# Patient Record
Sex: FEMALE | NOT HISPANIC OR LATINO | URBAN - METROPOLITAN AREA
[De-identification: names, ages, dates, MRNs, and addresses within clinical notes are randomized per-mention and may not be internally consistent; named-entity substitution may affect disease eponyms.]

---

## 2018-05-03 ENCOUNTER — INPATIENT (INPATIENT)
Facility: HOSPITAL | Age: 68
LOS: 10 days | Discharge: HOME | DRG: 673 | End: 2018-05-14
Attending: FAMILY MEDICINE | Admitting: INTERNAL MEDICINE
Payer: COMMERCIAL

## 2018-05-03 ENCOUNTER — EMERGENCY (EMERGENCY)
Facility: HOSPITAL | Age: 68
LOS: 1 days | Discharge: ANOTHER HOSPITAL | End: 2018-05-03
Attending: EMERGENCY MEDICINE | Admitting: EMERGENCY MEDICINE
Payer: COMMERCIAL

## 2018-05-03 VITALS
DIASTOLIC BLOOD PRESSURE: 79 MMHG | OXYGEN SATURATION: 99 % | SYSTOLIC BLOOD PRESSURE: 188 MMHG | RESPIRATION RATE: 20 BRPM | HEART RATE: 85 BPM

## 2018-05-03 VITALS
HEIGHT: 65 IN | HEART RATE: 62 BPM | RESPIRATION RATE: 16 BRPM | SYSTOLIC BLOOD PRESSURE: 175 MMHG | OXYGEN SATURATION: 100 % | TEMPERATURE: 98 F | DIASTOLIC BLOOD PRESSURE: 80 MMHG | WEIGHT: 210.1 LBS

## 2018-05-03 VITALS
TEMPERATURE: 99 F | HEART RATE: 74 BPM | WEIGHT: 188.05 LBS | RESPIRATION RATE: 20 BRPM | HEIGHT: 65 IN | SYSTOLIC BLOOD PRESSURE: 174 MMHG | DIASTOLIC BLOOD PRESSURE: 84 MMHG | OXYGEN SATURATION: 99 %

## 2018-05-03 DIAGNOSIS — N19 UNSPECIFIED KIDNEY FAILURE: ICD-10-CM

## 2018-05-03 DIAGNOSIS — D64.9 ANEMIA, UNSPECIFIED: ICD-10-CM

## 2018-05-03 DIAGNOSIS — E11.9 TYPE 2 DIABETES MELLITUS WITHOUT COMPLICATIONS: ICD-10-CM

## 2018-05-03 DIAGNOSIS — R07.9 CHEST PAIN, UNSPECIFIED: ICD-10-CM

## 2018-05-03 DIAGNOSIS — Z29.9 ENCOUNTER FOR PROPHYLACTIC MEASURES, UNSPECIFIED: ICD-10-CM

## 2018-05-03 DIAGNOSIS — I25.10 ATHEROSCLEROTIC HEART DISEASE OF NATIVE CORONARY ARTERY WITHOUT ANGINA PECTORIS: ICD-10-CM

## 2018-05-03 DIAGNOSIS — R06.02 SHORTNESS OF BREATH: ICD-10-CM

## 2018-05-03 DIAGNOSIS — Z90.710 ACQUIRED ABSENCE OF BOTH CERVIX AND UTERUS: Chronic | ICD-10-CM

## 2018-05-03 DIAGNOSIS — I10 ESSENTIAL (PRIMARY) HYPERTENSION: ICD-10-CM

## 2018-05-03 LAB
ALBUMIN SERPL ELPH-MCNC: 2.9 G/DL — LOW (ref 3.3–5)
ALP SERPL-CCNC: 72 U/L — SIGNIFICANT CHANGE UP (ref 30–120)
ALT FLD-CCNC: 16 U/L DA — SIGNIFICANT CHANGE UP (ref 10–60)
ANION GAP SERPL CALC-SCNC: 10 MMOL/L — SIGNIFICANT CHANGE UP (ref 5–17)
APPEARANCE UR: CLEAR — SIGNIFICANT CHANGE UP
APTT BLD: 27.4 SEC — LOW (ref 27.5–37.4)
AST SERPL-CCNC: 14 U/L — SIGNIFICANT CHANGE UP (ref 10–40)
BACTERIA # UR AUTO: ABNORMAL
BASOPHILS # BLD AUTO: 0 K/UL — SIGNIFICANT CHANGE UP (ref 0–0.2)
BASOPHILS NFR BLD AUTO: 0.2 % — SIGNIFICANT CHANGE UP (ref 0–2)
BILIRUB SERPL-MCNC: 0.2 MG/DL — SIGNIFICANT CHANGE UP (ref 0.2–1.2)
BILIRUB UR-MCNC: NEGATIVE — SIGNIFICANT CHANGE UP
BUN SERPL-MCNC: 67 MG/DL — HIGH (ref 7–23)
CALCIUM SERPL-MCNC: 9.1 MG/DL — SIGNIFICANT CHANGE UP (ref 8.4–10.5)
CHLORIDE SERPL-SCNC: 106 MMOL/L — SIGNIFICANT CHANGE UP (ref 96–108)
CK MB CFR SERPL CALC: 1 NG/ML — SIGNIFICANT CHANGE UP (ref 0–3.6)
CO2 SERPL-SCNC: 24 MMOL/L — SIGNIFICANT CHANGE UP (ref 22–31)
COLOR SPEC: YELLOW — SIGNIFICANT CHANGE UP
CREAT SERPL-MCNC: 4.88 MG/DL — HIGH (ref 0.5–1.3)
DIFF PNL FLD: ABNORMAL
EOSINOPHIL # BLD AUTO: 0.2 K/UL — SIGNIFICANT CHANGE UP (ref 0–0.5)
EOSINOPHIL NFR BLD AUTO: 2.2 % — SIGNIFICANT CHANGE UP (ref 0–6)
EPI CELLS # UR: SIGNIFICANT CHANGE UP
GLUCOSE SERPL-MCNC: 158 MG/DL — HIGH (ref 70–99)
GLUCOSE UR QL: 100 MG/DL
HCT VFR BLD CALC: 24.4 % — LOW (ref 34.5–45)
HGB BLD-MCNC: 7.5 G/DL — LOW (ref 11.5–15.5)
INR BLD: 1.22 RATIO — HIGH (ref 0.88–1.16)
KETONES UR-MCNC: NEGATIVE — SIGNIFICANT CHANGE UP
LACTATE SERPL-SCNC: 0.5 MMOL/L — LOW (ref 0.7–2)
LEUKOCYTE ESTERASE UR-ACNC: NEGATIVE — SIGNIFICANT CHANGE UP
LYMPHOCYTES # BLD AUTO: 2.2 K/UL — SIGNIFICANT CHANGE UP (ref 1–3.3)
LYMPHOCYTES # BLD AUTO: 22 % — SIGNIFICANT CHANGE UP (ref 13–44)
MCHC RBC-ENTMCNC: 26.4 PG — LOW (ref 27–34)
MCHC RBC-ENTMCNC: 30.8 GM/DL — LOW (ref 32–36)
MCV RBC AUTO: 85.6 FL — SIGNIFICANT CHANGE UP (ref 80–100)
MONOCYTES # BLD AUTO: 0.5 K/UL — SIGNIFICANT CHANGE UP (ref 0–0.9)
MONOCYTES NFR BLD AUTO: 5.1 % — SIGNIFICANT CHANGE UP (ref 2–14)
NEUTROPHILS # BLD AUTO: 7 K/UL — SIGNIFICANT CHANGE UP (ref 1.8–7.4)
NEUTROPHILS NFR BLD AUTO: 70.5 % — SIGNIFICANT CHANGE UP (ref 43–77)
NITRITE UR-MCNC: NEGATIVE — SIGNIFICANT CHANGE UP
NT-PROBNP SERPL-SCNC: 7456 PG/ML — HIGH (ref 0–125)
PH UR: 6 — SIGNIFICANT CHANGE UP (ref 5–8)
PLATELET # BLD AUTO: 229 K/UL — SIGNIFICANT CHANGE UP (ref 150–400)
POTASSIUM SERPL-MCNC: 5.8 MMOL/L — HIGH (ref 3.5–5.3)
POTASSIUM SERPL-SCNC: 5.8 MMOL/L — HIGH (ref 3.5–5.3)
PROT SERPL-MCNC: 7.8 G/DL — SIGNIFICANT CHANGE UP (ref 6–8.3)
PROT UR-MCNC: 100 MG/DL
PROTHROM AB SERPL-ACNC: 13.3 SEC — HIGH (ref 9.8–12.7)
RAPID RVP RESULT: SIGNIFICANT CHANGE UP
RBC # BLD: 2.85 M/UL — LOW (ref 3.8–5.2)
RBC # FLD: 14.7 % — HIGH (ref 10.3–14.5)
RBC CASTS # UR COMP ASSIST: ABNORMAL /HPF (ref 0–4)
SODIUM SERPL-SCNC: 140 MMOL/L — SIGNIFICANT CHANGE UP (ref 135–145)
SP GR SPEC: 1.01 — SIGNIFICANT CHANGE UP (ref 1.01–1.02)
TROPONIN I SERPL-MCNC: 0.02 NG/ML — SIGNIFICANT CHANGE UP (ref 0.02–0.06)
UROBILINOGEN FLD QL: NEGATIVE MG/DL — SIGNIFICANT CHANGE UP
WBC # BLD: 10 K/UL — SIGNIFICANT CHANGE UP (ref 3.8–10.5)
WBC # FLD AUTO: 10 K/UL — SIGNIFICANT CHANGE UP (ref 3.8–10.5)
WBC UR QL: SIGNIFICANT CHANGE UP

## 2018-05-03 PROCEDURE — 85730 THROMBOPLASTIN TIME PARTIAL: CPT

## 2018-05-03 PROCEDURE — 87086 URINE CULTURE/COLONY COUNT: CPT

## 2018-05-03 PROCEDURE — 99285 EMERGENCY DEPT VISIT HI MDM: CPT | Mod: 25

## 2018-05-03 PROCEDURE — 99223 1ST HOSP IP/OBS HIGH 75: CPT | Mod: AI,GC

## 2018-05-03 PROCEDURE — 83605 ASSAY OF LACTIC ACID: CPT

## 2018-05-03 PROCEDURE — 82553 CREATINE MB FRACTION: CPT

## 2018-05-03 PROCEDURE — 96375 TX/PRO/DX INJ NEW DRUG ADDON: CPT

## 2018-05-03 PROCEDURE — 87633 RESP VIRUS 12-25 TARGETS: CPT

## 2018-05-03 PROCEDURE — 87040 BLOOD CULTURE FOR BACTERIA: CPT

## 2018-05-03 PROCEDURE — 99285 EMERGENCY DEPT VISIT HI MDM: CPT

## 2018-05-03 PROCEDURE — 93010 ELECTROCARDIOGRAM REPORT: CPT

## 2018-05-03 PROCEDURE — 93005 ELECTROCARDIOGRAM TRACING: CPT

## 2018-05-03 PROCEDURE — 85027 COMPLETE CBC AUTOMATED: CPT

## 2018-05-03 PROCEDURE — 87581 M.PNEUMON DNA AMP PROBE: CPT

## 2018-05-03 PROCEDURE — 80053 COMPREHEN METABOLIC PANEL: CPT

## 2018-05-03 PROCEDURE — 87486 CHLMYD PNEUM DNA AMP PROBE: CPT

## 2018-05-03 PROCEDURE — 85610 PROTHROMBIN TIME: CPT

## 2018-05-03 PROCEDURE — 84484 ASSAY OF TROPONIN QUANT: CPT

## 2018-05-03 PROCEDURE — 87798 DETECT AGENT NOS DNA AMP: CPT

## 2018-05-03 PROCEDURE — 81001 URINALYSIS AUTO W/SCOPE: CPT

## 2018-05-03 PROCEDURE — 83880 ASSAY OF NATRIURETIC PEPTIDE: CPT

## 2018-05-03 PROCEDURE — 96374 THER/PROPH/DIAG INJ IV PUSH: CPT

## 2018-05-03 PROCEDURE — 71045 X-RAY EXAM CHEST 1 VIEW: CPT

## 2018-05-03 PROCEDURE — 71045 X-RAY EXAM CHEST 1 VIEW: CPT | Mod: 26

## 2018-05-03 RX ORDER — HEPARIN SODIUM 5000 [USP'U]/ML
5000 INJECTION INTRAVENOUS; SUBCUTANEOUS EVERY 12 HOURS
Qty: 0 | Refills: 0 | Status: DISCONTINUED | OUTPATIENT
Start: 2018-05-03 | End: 2018-05-05

## 2018-05-03 RX ORDER — GLUCAGON INJECTION, SOLUTION 0.5 MG/.1ML
1 INJECTION, SOLUTION SUBCUTANEOUS ONCE
Qty: 0 | Refills: 0 | Status: DISCONTINUED | OUTPATIENT
Start: 2018-05-03 | End: 2018-05-11

## 2018-05-03 RX ORDER — SODIUM CHLORIDE 9 MG/ML
1000 INJECTION, SOLUTION INTRAVENOUS
Qty: 0 | Refills: 0 | Status: DISCONTINUED | OUTPATIENT
Start: 2018-05-03 | End: 2018-05-11

## 2018-05-03 RX ORDER — INSULIN LISPRO 100/ML
VIAL (ML) SUBCUTANEOUS
Qty: 0 | Refills: 0 | Status: DISCONTINUED | OUTPATIENT
Start: 2018-05-03 | End: 2018-05-11

## 2018-05-03 RX ORDER — FUROSEMIDE 40 MG
40 TABLET ORAL ONCE
Qty: 0 | Refills: 0 | Status: COMPLETED | OUTPATIENT
Start: 2018-05-03 | End: 2018-05-03

## 2018-05-03 RX ORDER — DEXTROSE 50 % IN WATER 50 %
25 SYRINGE (ML) INTRAVENOUS ONCE
Qty: 0 | Refills: 0 | Status: DISCONTINUED | OUTPATIENT
Start: 2018-05-03 | End: 2018-05-11

## 2018-05-03 RX ORDER — DEXTROSE 50 % IN WATER 50 %
12.5 SYRINGE (ML) INTRAVENOUS ONCE
Qty: 0 | Refills: 0 | Status: DISCONTINUED | OUTPATIENT
Start: 2018-05-03 | End: 2018-05-11

## 2018-05-03 RX ORDER — HYDRALAZINE HCL 50 MG
10 TABLET ORAL ONCE
Qty: 0 | Refills: 0 | Status: COMPLETED | OUTPATIENT
Start: 2018-05-03 | End: 2018-05-03

## 2018-05-03 RX ORDER — DEXTROSE 50 % IN WATER 50 %
1 SYRINGE (ML) INTRAVENOUS ONCE
Qty: 0 | Refills: 0 | Status: DISCONTINUED | OUTPATIENT
Start: 2018-05-03 | End: 2018-05-11

## 2018-05-03 RX ORDER — NITROGLYCERIN 6.5 MG
1 CAPSULE, EXTENDED RELEASE ORAL ONCE
Qty: 0 | Refills: 0 | Status: COMPLETED | OUTPATIENT
Start: 2018-05-03 | End: 2018-05-03

## 2018-05-03 RX ORDER — ALBUTEROL 90 UG/1
2.5 AEROSOL, METERED ORAL ONCE
Qty: 0 | Refills: 0 | Status: COMPLETED | OUTPATIENT
Start: 2018-05-03 | End: 2018-05-03

## 2018-05-03 RX ORDER — ASPIRIN/CALCIUM CARB/MAGNESIUM 324 MG
325 TABLET ORAL ONCE
Qty: 0 | Refills: 0 | Status: COMPLETED | OUTPATIENT
Start: 2018-05-03 | End: 2018-05-03

## 2018-05-03 RX ORDER — SODIUM POLYSTYRENE SULFONATE 4.1 MEQ/G
30 POWDER, FOR SUSPENSION ORAL ONCE
Qty: 0 | Refills: 0 | Status: COMPLETED | OUTPATIENT
Start: 2018-05-03 | End: 2018-05-03

## 2018-05-03 RX ADMIN — Medication 40 MILLIGRAM(S): at 17:50

## 2018-05-03 RX ADMIN — Medication 40 MILLIGRAM(S): at 19:27

## 2018-05-03 RX ADMIN — Medication 1 INCH(S): at 17:50

## 2018-05-03 RX ADMIN — SODIUM POLYSTYRENE SULFONATE 30 GRAM(S): 4.1 POWDER, FOR SUSPENSION ORAL at 21:39

## 2018-05-03 RX ADMIN — Medication 325 MILLIGRAM(S): at 17:50

## 2018-05-03 RX ADMIN — ALBUTEROL 2.5 MILLIGRAM(S): 90 AEROSOL, METERED ORAL at 21:39

## 2018-05-03 NOTE — H&P ADULT - ATTENDING COMMENTS
Pt with acute renal failure, transferred from [place].  Will need HD.  Nephro already consulted, will see Pt in the Am and will llikely start HD.  Pt was in respiratory distress, Lasix helped relieve breathing issue.  Unclear anemia etiology, but possibly b/c of renal disease.    Cardio consulted 2/2 chest pain, initial workup negative.  Trend trops.

## 2018-05-03 NOTE — H&P ADULT - PROBLEM SELECTOR PLAN 1
CKD, previous refusal of dialysis. Now with hyperkalemia, s/p kayexalate and Albuterol  Dr. Albarran (Renal) consulted  in need of dialysis

## 2018-05-03 NOTE — H&P ADULT - PROBLEM SELECTOR PLAN 7
hx MI 2007 s/p stents   - No acute EKG changes, no elevation in trops   - Continue clopidogrel  - Cardio (Dr. Mosher) consulted.

## 2018-05-03 NOTE — H&P ADULT - PROBLEM SELECTOR PLAN 2
Unknown baseline, contact PMD in AM for baseline Hg  f/u iron studies  f/u FOBT Unknown baseline, contact PMD (Dr. Everardo Black, [facility]) in AM for baseline Hg; [place] anemia of chronic disease given CKD.   f/u iron studies  f/u FOBT Unknown baseline, contact PMD (Dr. Everardo Black, [facility]) in AM for baseline Hg; Likely anemia of chronic disease given CKD.   f/u iron studies  f/u FOBT

## 2018-05-03 NOTE — H&P ADULT - HISTORY OF PRESENT ILLNESS
68F PMHx CAD (3 stents 2007), DM type 2, HTN transferred from [place] ED for HD. Presented to [place] ED with SOB and CP x 2 days, mildly relieved by Nitroglycerin. Per patient...  Patient has previuosly been told by nephrologist she needs dialysis but has refused.     In the [place] ED Hg 7.5, K+ 5.8, Cr 4.88, lactate 0.5, proBNP 7456, trop negative x1.  UA grossly negative, RVP negative, CXR showing increased pulmonary vascular congestion. Dr. Mena (Cardio) consulted, CP likely MSK and MI to be ruled out with trops x2. Given , Lasix 40 x2, Nitro. Dr. Albarran (renal) paged.    Upon arrival to [place] /80, T 98.5, HR 62, RR 16, SpO2 100% on O2, given Albuterol and Kayexalate. 68F PMHx CAD (3 stents 2007), DM type 2 on insulin, Peripheral neuropathy, HTN transferred from [place] [facility] for HD. Presented to [facility] with progressive SOB and CP x 2 days, mildly relieved by Nitroglycerin. Patient's grandson provided translation. Per patient, she has had progressive SOB and mild cp over the past week. She has no prior history of a similar complaint. The SOB is made worse with activity and lying flat at night. She describes the chest pain and a dull pain without radiation. It is rated 3/10. She denies recent travel or sick contacts. Per grandson, patient has been told by nephrologist she needs dialysis but has refused; however consents to dialysis today. Patients admits to fatigue, lightheadedness, cp, palpitations, SOB, lower extremity edema. She denies fever, chills, N/V/C/D, abdominal pain, numbness/tingling in digits.     In the [place] [facility] Hg 7.5, K+ 5.8, Cr 4.88, lactate 0.5, proBNP 7456, trop negative x1.  UA grossly negative, RVP negative, CXR showing increased pulmonary vascular congestion. Dr. Mena (Cardio) consulted, CP likely MSK and MI to be ruled out with trops x2. Given , Lasix 40 x2, Nitro. Dr. Albarran (renal) paged.    Upon arrival to [place] /80, T 98.5, HR 62, RR 16, SpO2 100% on O2, given Albuterol and Kayexalate. 68F PMHx CAD (3 stents 2007), DM type 2 on insulin, Peripheral neuropathy, HTN, CKD now ESRD transferred from [place] ED for HD. Presented to [place] ED with progressive SOB and CP x 2 days, mildly relieved by Nitroglycerin. Patient's grandson provided translation. Per patient, she has had progressive SOB and mild cp over the past week. She has no prior history of a similar complaint. The SOB is made worse with activity and lying flat at night. She describes the chest pain and a dull pain without radiation. It is rated 3/10. She denies recent travel or sick contacts. Per grandson, patient has been told by nephrologist she needs dialysis but has refused; however consents to dialysis today. Patients admits to fatigue, lightheadedness, cp, palpitations, SOB, lower extremity edema. She denies fever, chills, N/V/C/D, abdominal pain, numbness/tingling in digits.     In the [place] ED Hg 7.5, K+ 5.8, Cr 4.88, lactate 0.5, proBNP 7456, trop negative x1.  UA grossly negative, RVP negative, CXR showing increased pulmonary vascular congestion. Dr. Mena (Cardio) consulted, CP likely MSK and MI to be ruled out with trops x2. Given , Lasix 40 x2, Nitro. Dr. Albarran (renal) paged.    Upon arrival to [place] /80, T 98.5, HR 62, RR 16, SpO2 100% on O2, given Albuterol and Kayexalate.

## 2018-05-03 NOTE — H&P ADULT - PROBLEM SELECTOR PLAN 3
with CXR showing pulmonary vascular congestion  fluid overload, s/p lasix 40x2, in need of HD  f/u Echo  proBNP elevated, likely component due to renal failure  Dr. Mena (Cardio) consulted with CXR showing pulmonary vascular congestion.   fluid overload, s/p lasix 40x2, in need of HD  f/u Echo  proBNP elevated, likely component due to renal failure  Dr. Mena (Cardio) consulted

## 2018-05-03 NOTE — H&P ADULT - PROBLEM SELECTOR PLAN 6
Chronic.   - low dose ISS with accuchecks, hypoglycemic protocol  - Diet: consistent carb Chronic.   - low dose ISS with accuchecks, hypoglycemic protocol  - Home lantus dose (30 AM, 10 PM) adjusted to (26 AM, 8 qhs)  - Diet: consistent carb

## 2018-05-03 NOTE — H&P ADULT - NSHPPHYSICALEXAM_GEN_ALL_CORE
Vital Signs  T(C): 36.6 (03 May 2018 23:17), Max: 36.9 (03 May 2018 20:37)  T(F): 97.8 (03 May 2018 23:17), Max: 98.5 (03 May 2018 20:37)  HR: 66 (03 May 2018 23:17) (62 - 66)  BP: 185/69 (03 May 2018 23:17) (175/80 - 185/69)  RR: 15 (03 May 2018 23:17) (15 - 16)  SpO2: 99% (03 May 2018 23:17) (99% - 100%) Vital Signs  T(C): 36.6 (03 May 2018 23:17), Max: 36.9 (03 May 2018 20:37)  T(F): 97.8 (03 May 2018 23:17), Max: 98.5 (03 May 2018 20:37)  HR: 66 (03 May 2018 23:17) (62 - 66)  BP: 185/69 (03 May 2018 23:17) (175/80 - 185/69)  RR: 15 (03 May 2018 23:17) (15 - 16)  SpO2: 99% (03 May 2018 23:17) (99% - 100%)    Physical Exam:  General: Elderly, East [place] woman in NAD.   HEENT: NCAT, PERRLA, EOMI bl, moist mucous membranes   Neck: Supple, nontender, no mass  Neurology: A&Ox3, nonfocal, CN II-XII grossly intact, sensation intact  Respiratory: decreased breath sound b/l; lung clear to auscultation b/l; no wheezing, rales, rhonci appreciated  CV: RRR, +S1/S2, no murmurs, rubs or gallops  Abdominal: Obese, Soft, NT, ND +BSx4  Extremities: 2+ pitting edema b/l, + peripheral pulses; dorsum left foot small (5 x3 cm) eccymosis  MSK: Normal ROM, no joint erythema or warmth, no joint swelling   Skin: warm, dry, normal color, no rash or abnormal lesions Vital Signs  T(C): 36.6 (03 May 2018 23:17), Max: 36.9 (03 May 2018 20:37)  T(F): 97.8 (03 May 2018 23:17), Max: 98.5 (03 May 2018 20:37)  HR: 66 (03 May 2018 23:17) (62 - 66)  BP: 185/69 (03 May 2018 23:17) (175/80 - 185/69)  RR: 15 (03 May 2018 23:17) (15 - 16)  SpO2: 99% (03 May 2018 23:17) (99% - 100%)    Physical Exam:  General: Elderly, [place] woman in NAD.   HEENT: NCAT, PERRLA, EOMI bl, moist mucous membranes   Neck: Supple, nontender, no mass  Neurology: A&Ox3, nonfocal, CN II-XII grossly intact, sensation intact  Respiratory: decreased breath sound b/l; lung clear to auscultation b/l; no wheezing, rales, rhonchi appreciated  CV: RRR, +S1/S2, no murmurs, rubs or gallops  Abdominal: Obese, Soft, NT, ND +BSx4  Extremities: 2+ pitting edema b/l, + peripheral pulses; dorsum left foot small (5 x3 cm) ecchymosis  MSK: Normal ROM, no joint erythema or warmth, no joint swelling   Skin: warm, dry, normal color, no rash or abnormal lesions

## 2018-05-03 NOTE — ED PROVIDER NOTE - MEDICAL DECISION MAKING DETAILS
69 yo F with CAD stents co chest pain and sob since last night. RO MI/CHF. Plan - EKG, CXR, labs, likely admit, cardio consult.

## 2018-05-03 NOTE — ED PROVIDER NOTE - CRITICAL CARE PROVIDED
additional history taking/interpretation of diagnostic studies/consult w/ pt's family directly relating to pts condition/direct patient care (not related to procedure)/documentation/consultation with other physicians

## 2018-05-03 NOTE — ED ADULT NURSE NOTE - OBJECTIVE STATEMENT
Presents to ED via amb from home. According to pt's son she developed SOB yesterday that is getting worse today. Today c/o mild chest discomfort. O&E alert & oriented. Color fair. Skin warm & dry to touch. Lungs- clear, diminished at bases. Abd soft nontender. 2+ bilat pedal edema. CM- RSR without ectopics noted

## 2018-05-03 NOTE — ED PROVIDER NOTE - PROGRESS NOTE DETAILS
Creat noted to be 4.8 with K of 5.8. Pt will need dialysis. Plan - Transfer to [place] [facility] for Renal eval and possible dialysis. Dr Hinson and Dr. Deion renteria.

## 2018-05-03 NOTE — ED PROVIDER NOTE - OBJECTIVE STATEMENT
69 y/o F pt with history of 3 coronary stents (2007) presents to the ED BIB ambulance c/o shortness of breath and chest pain beginning yesterday. Pt has not had any recent hospitalizations. Pt had a nitroglycerin pill in the ambulance, with mild relief. Pt takes Aspirin daily. Denies cough, nausea, abdominal pain, vomiting, fever. No further complaints at this time.   PMD- [facility]

## 2018-05-03 NOTE — H&P ADULT - NSHPREVIEWOFSYSTEMS_GEN_ALL_CORE
Constitutional: (+) admits fatigue, (-) denies fever, chills  HEENT: denies blurry vision, difficulty hearing  Respiratory: (-) admits SOB, HINSON, (-) cough, sputum production, wheezing, hemoptysis  Cardiovascular: (+) admits CP, palpitations, edema  Gastrointestinal: (-) denies nausea, vomiting, diarrhea, constipation, abdominal pain  Genitourinary: denies dysuria, frequency, urgency, hematuria   Skin/Breast: denies rash, itching  Musculoskeletal: denies myalgias, joint swelling, muscle weakness  Neurologic: (+) admits weakness, dizziness, numbness/tingling  Psychiatric: denies feeling anxious, depressed, suicidal, homicidal thoughts  Hematology/Oncology: denies bruising, tender or enlarged lymph nodes   ROS negative except as noted above

## 2018-05-03 NOTE — H&P ADULT - NSHPSOCIALHISTORY_GEN_ALL_CORE
Social History:    Marital Status:  (   )    (   ) Single    (   )    (  )   Occupation:   Lives with: (  ) alone  (  ) children   (  ) spouse   (  ) parents  (  ) other    Substance Use (street drugs): (  ) never used  (  ) other:  Tobacco Usage:  (   ) never smoked   (   ) former smoker   (   ) current smoker  (     ) pack year  (        ) last cigarette date  Alcohol Usage:  Sexual History:     Health Management     For female:   Last Mammo: (     ) No  (    ) Yes  (    ) Normal  (    ) Date   Last Pap: (     ) No  (    ) Yes  (    ) Normal   (    ) Date     Immunization Hx:   (  ) flu shot                               (     ) date   (  ) pneumonia shot               (     ) date  (  ) tetanus                               (     ) date     (     ) Advanced Directives: (     ) None    (      ) DNR    (     ) DNI    (     ) Health Care Proxy: Social History:    Occupation: Retired  Lives with: (  ) alone  ( x ) children   (  ) spouse   (  ) parents  (  ) other    Substance Use (street drugs): ( x ) never used  (  ) other:  Tobacco Usage:  (  x ) never smoked   (   ) former smoker   (   ) current smoker  (     ) pack year  (        ) last cigarette date  Alcohol Usage: denies use

## 2018-05-03 NOTE — CONSULT NOTE ADULT - ASSESSMENT
The patient is a 68 year old female with a history of CAD s/p PCI in 2007 (triple vessel disease as per family), ESRD refusing dialysis who presents with chest pain and shortness of breath.    Plan:  - Chest pain is atypical and reproducible; more likely consistent with a musculoskeletal etiology  - ECG with no evidence of ischemia or infarction  - Rule out acute MI with two sets of cardiac enzymes  - CXR with vascular congestion  - Check echocardiogram  - Patient will need to be diuresed. Give furosemide 40 mg IV now and continue q12h.  - Continue aspirin 81 mg daily  - Continue clopidogrel 75 mg daily  - Dose of isosorbide, metoprolol, nifedipine, and lovastatin unknown. Start at low doses for now and clarify.  - Renal evaluation  - Would clarify DNR/DNI status as patient has been refusing dialysis

## 2018-05-03 NOTE — ED PROVIDER NOTE - PMH
CAD (coronary artery disease)    CRF (chronic renal failure)    Diabetes    Diabetes    Hypertension    Hypertension    Myocardial infarction    Pneumonia

## 2018-05-03 NOTE — H&P ADULT - PROBLEM SELECTOR PLAN 8
IMPROVE VTE Individual Risk Assessment          RISK                                                          Points  [  ] Previous VTE                                                3  [  ] Thrombophilia                                             2  [  ] Lower limb paralysis                                   2        (unable to hold up >15 seconds)    [  ] Current Cancer                                             2         (within 6 months)  [  ] Immobilization > 24 hrs                              1  [  ] ICU/CCU stay > 24 hours                             1  [  ] Age > 60                                                         1    IMPROVE VTE Score: DVT ppx - Heparin    IMPROVE VTE Individual Risk Assessment          RISK                                                          Points  [  ] Previous VTE                                                3  [  ] Thrombophilia                                             2  [  ] Lower limb paralysis                                   2        (unable to hold up >15 seconds)    [  ] Current Cancer                                             2         (within 6 months)  [  ] Immobilization > 24 hrs                              1  [  ] ICU/CCU stay > 24 hours                             1  [  ] Age > 60                                                         1    IMPROVE VTE Score: DVT ppx - Heparin  Diet - consistent carb  Activity - out of bed with assistance   Fall Precautions    IMPROVE VTE Individual Risk Assessment          RISK                                                          Points  [  ] Previous VTE                                                3  [  ] Thrombophilia                                             2  [  ] Lower limb paralysis                                   2        (unable to hold up >15 seconds)    [  ] Current Cancer                                             2         (within 6 months)  [  ] Immobilization > 24 hrs                              1  [  ] ICU/CCU stay > 24 hours                             1  [  ] Age > 60                                                         1    IMPROVE VTE Score: DVT ppx - Heparin  Diet - consistent carb  Activity - out of bed with assistance   Fall Precautions    IMPROVE VTE Individual Risk Assessment          RISK                                                          Points  [  ] Previous VTE                                                3  [  ] Thrombophilia                                             2  [  ] Lower limb paralysis                                   2        (unable to hold up >15 seconds)    [  ] Current Cancer                                             2         (within 6 months)  [  ] Immobilization > 24 hrs                              1  [  ] ICU/CCU stay > 24 hours                             1  [ x ] Age > 60                                                         1    IMPROVE VTE Score:

## 2018-05-03 NOTE — H&P ADULT - PROBLEM SELECTOR PLAN 5
Chronic.   - Continue home medications with hold parameters. Chronic.   - Continue home medications with hold parameters.   - Cardio recs Lasix 40 IVP q12H

## 2018-05-03 NOTE — CONSULT NOTE ADULT - SUBJECTIVE AND OBJECTIVE BOX
History of Present Illness: The patient is a 68 year old female with a history of CAD s/p PCI in 2007 (triple vessel disease as per family), ESRD refusing dialysis who presents with chest pain and shortness of breath. She noted substernal pinching chest pain and heaviness, non-radiating, non-exertional. She has also had worsening shortness of breath over the past two days. She has had dyspnea on exertion and fatigue on exertion with just a few steps. Family states that she has had discussions with her nephrologist and she has refused dialysis.    Past Medical/Surgical History:  CAD s/p PCI in 2007 (triple vessel disease as per family), ESRD     Medications:  Home Medications:      Family History: Non-contributory family history of premature cardiovascular atherosclerotic disease    Social History: No tobacco, alcohol or drug use    Review of Systems:  General: No fevers, chills, weight loss or gain  Skin: No rashes, color changes  Cardiovascular: No chest pain, orthopnea  Respiratory: No shortness of breath, cough  Gastrointestinal: No nausea, abdominal pain  Genitourinary: No incontinence, pain with urination  Musculoskeletal: No pain, swelling, decreased range of motion  Neurological: No headache, weakness  Psychiatric: No depression, anxiety  Endocrine: No weight loss or gain, increased thirst  All other systems are comprehensively negative.    Physical Exam:  Vitals:        Vital Signs Last 24 Hrs  T(C): 37.1 (03 May 2018 16:56), Max: 37.1 (03 May 2018 16:56)  T(F): 98.7 (03 May 2018 16:56), Max: 98.7 (03 May 2018 16:56)  HR: 74 (03 May 2018 16:56) (74 - 74)  BP: 174/84 (03 May 2018 16:56) (174/84 - 174/84)  BP(mean): --  RR: 20 (03 May 2018 16:56) (20 - 20)  SpO2: 99% (03 May 2018 16:56) (99% - 99%)  General: NAD  HEENT: MMM  Neck: No JVD, no carotid bruit  Lungs: CTAB  CV: RRR, nl S1/S2, no M/R/G  Abdomen: S/NT/ND, +BS  Extremities: No LE edema, no cyanosis  Neuro: AAOx3, non-focal  Skin: No rash    Labs:                  ECG: NSR, normal axis, RBBB

## 2018-05-03 NOTE — H&P ADULT - ASSESSMENT
68F PMHx CAD (3 stents 2007), DM type 2 on insulin, Peripheral neuropathy, HTN transferred from [place] ED for hemodialysis due to electrolyte abnormalities. 68F PMHx CAD (3 stents 2007), DM type 2 on insulin, Peripheral neuropathy, HTN transferred from [place] ED for hemodialysis due to electrolyte abnormalities, shortness of breath, chest pain.

## 2018-05-04 LAB
ALBUMIN SERPL ELPH-MCNC: 2.9 G/DL — LOW (ref 3.3–5)
ALP SERPL-CCNC: 69 U/L — SIGNIFICANT CHANGE UP (ref 40–120)
ALT FLD-CCNC: 12 U/L — SIGNIFICANT CHANGE UP (ref 12–78)
ANION GAP SERPL CALC-SCNC: 10 MMOL/L — SIGNIFICANT CHANGE UP (ref 5–17)
ANION GAP SERPL CALC-SCNC: 11 MMOL/L — SIGNIFICANT CHANGE UP (ref 5–17)
AST SERPL-CCNC: 11 U/L — LOW (ref 15–37)
BASOPHILS # BLD AUTO: 0.1 K/UL — SIGNIFICANT CHANGE UP (ref 0–0.2)
BASOPHILS NFR BLD AUTO: 0.6 % — SIGNIFICANT CHANGE UP (ref 0–2)
BILIRUB SERPL-MCNC: 0.3 MG/DL — SIGNIFICANT CHANGE UP (ref 0.2–1.2)
BUN SERPL-MCNC: 69 MG/DL — HIGH (ref 7–23)
BUN SERPL-MCNC: 70 MG/DL — HIGH (ref 7–23)
CALCIUM SERPL-MCNC: 9.2 MG/DL — SIGNIFICANT CHANGE UP (ref 8.5–10.1)
CALCIUM SERPL-MCNC: 9.3 MG/DL — SIGNIFICANT CHANGE UP (ref 8.5–10.1)
CHLORIDE SERPL-SCNC: 112 MMOL/L — HIGH (ref 96–108)
CHLORIDE SERPL-SCNC: 113 MMOL/L — HIGH (ref 96–108)
CK MB BLD-MCNC: 4.4 % — HIGH (ref 0–3.5)
CK MB CFR SERPL CALC: 2.3 NG/ML — SIGNIFICANT CHANGE UP (ref 0–3.6)
CK SERPL-CCNC: 52 U/L — SIGNIFICANT CHANGE UP (ref 26–192)
CO2 SERPL-SCNC: 23 MMOL/L — SIGNIFICANT CHANGE UP (ref 22–31)
CO2 SERPL-SCNC: 23 MMOL/L — SIGNIFICANT CHANGE UP (ref 22–31)
CREAT SERPL-MCNC: 5 MG/DL — HIGH (ref 0.5–1.3)
CREAT SERPL-MCNC: 5 MG/DL — HIGH (ref 0.5–1.3)
EOSINOPHIL # BLD AUTO: 0.3 K/UL — SIGNIFICANT CHANGE UP (ref 0–0.5)
EOSINOPHIL NFR BLD AUTO: 2.7 % — SIGNIFICANT CHANGE UP (ref 0–6)
FERRITIN SERPL-MCNC: 199 NG/ML — HIGH (ref 15–150)
GLUCOSE SERPL-MCNC: 104 MG/DL — HIGH (ref 70–99)
GLUCOSE SERPL-MCNC: 112 MG/DL — HIGH (ref 70–99)
HBA1C BLD-MCNC: 7.7 % — HIGH (ref 4–5.6)
HBA1C BLD-MCNC: 7.9 % — HIGH (ref 4–5.6)
HCT VFR BLD CALC: 23.9 % — LOW (ref 34.5–45)
HCT VFR BLD CALC: 24.4 % — LOW (ref 34.5–45)
HGB BLD-MCNC: 7.4 G/DL — LOW (ref 11.5–15.5)
HGB BLD-MCNC: 7.7 G/DL — LOW (ref 11.5–15.5)
IRON SATN MFR SERPL: 14 % — SIGNIFICANT CHANGE UP (ref 14–50)
IRON SATN MFR SERPL: 42 UG/DL — SIGNIFICANT CHANGE UP (ref 30–160)
LYMPHOCYTES # BLD AUTO: 2.7 K/UL — SIGNIFICANT CHANGE UP (ref 1–3.3)
LYMPHOCYTES # BLD AUTO: 26 % — SIGNIFICANT CHANGE UP (ref 13–44)
MCHC RBC-ENTMCNC: 26.6 PG — LOW (ref 27–34)
MCHC RBC-ENTMCNC: 26.7 PG — LOW (ref 27–34)
MCHC RBC-ENTMCNC: 31 GM/DL — LOW (ref 32–36)
MCHC RBC-ENTMCNC: 31.7 GM/DL — LOW (ref 32–36)
MCV RBC AUTO: 84 FL — SIGNIFICANT CHANGE UP (ref 80–100)
MCV RBC AUTO: 86.2 FL — SIGNIFICANT CHANGE UP (ref 80–100)
MONOCYTES # BLD AUTO: 0.6 K/UL — SIGNIFICANT CHANGE UP (ref 0–0.9)
MONOCYTES NFR BLD AUTO: 5.9 % — SIGNIFICANT CHANGE UP (ref 1–9)
NEUTROPHILS # BLD AUTO: 6.7 K/UL — SIGNIFICANT CHANGE UP (ref 1.8–7.4)
NEUTROPHILS NFR BLD AUTO: 64.8 % — SIGNIFICANT CHANGE UP (ref 43–77)
PLATELET # BLD AUTO: 220 K/UL — SIGNIFICANT CHANGE UP (ref 150–400)
PLATELET # BLD AUTO: 220 K/UL — SIGNIFICANT CHANGE UP (ref 150–400)
POTASSIUM SERPL-MCNC: 4.9 MMOL/L — SIGNIFICANT CHANGE UP (ref 3.5–5.3)
POTASSIUM SERPL-MCNC: 5.1 MMOL/L — SIGNIFICANT CHANGE UP (ref 3.5–5.3)
POTASSIUM SERPL-SCNC: 4.9 MMOL/L — SIGNIFICANT CHANGE UP (ref 3.5–5.3)
POTASSIUM SERPL-SCNC: 5.1 MMOL/L — SIGNIFICANT CHANGE UP (ref 3.5–5.3)
PROT SERPL-MCNC: 7.5 G/DL — SIGNIFICANT CHANGE UP (ref 6–8.3)
RBC # BLD: 2.77 M/UL — LOW (ref 3.8–5.2)
RBC # BLD: 2.9 M/UL — LOW (ref 3.8–5.2)
RBC # FLD: 14.5 % — SIGNIFICANT CHANGE UP (ref 10.3–14.5)
RBC # FLD: 14.6 % — HIGH (ref 10.3–14.5)
SODIUM SERPL-SCNC: 145 MMOL/L — SIGNIFICANT CHANGE UP (ref 135–145)
SODIUM SERPL-SCNC: 147 MMOL/L — HIGH (ref 135–145)
TIBC SERPL-MCNC: 296 UG/DL — SIGNIFICANT CHANGE UP (ref 220–430)
TROPONIN I SERPL-MCNC: 0.16 NG/ML — HIGH (ref 0.01–0.04)
UIBC SERPL-MCNC: 254 UG/DL — SIGNIFICANT CHANGE UP (ref 110–370)
WBC # BLD: 10.3 K/UL — SIGNIFICANT CHANGE UP (ref 3.8–10.5)
WBC # BLD: 9.9 K/UL — SIGNIFICANT CHANGE UP (ref 3.8–10.5)
WBC # FLD AUTO: 10.3 K/UL — SIGNIFICANT CHANGE UP (ref 3.8–10.5)
WBC # FLD AUTO: 9.9 K/UL — SIGNIFICANT CHANGE UP (ref 3.8–10.5)

## 2018-05-04 PROCEDURE — 93010 ELECTROCARDIOGRAM REPORT: CPT

## 2018-05-04 PROCEDURE — 99233 SBSQ HOSP IP/OBS HIGH 50: CPT

## 2018-05-04 RX ORDER — INSULIN GLARGINE 100 [IU]/ML
8 INJECTION, SOLUTION SUBCUTANEOUS AT BEDTIME
Qty: 0 | Refills: 0 | Status: DISCONTINUED | OUTPATIENT
Start: 2018-05-04 | End: 2018-05-14

## 2018-05-04 RX ORDER — ISOSORBIDE MONONITRATE 60 MG/1
30 TABLET, EXTENDED RELEASE ORAL
Qty: 0 | Refills: 0 | COMMUNITY

## 2018-05-04 RX ORDER — CLOPIDOGREL BISULFATE 75 MG/1
75 TABLET, FILM COATED ORAL DAILY
Qty: 0 | Refills: 0 | Status: DISCONTINUED | OUTPATIENT
Start: 2018-05-04 | End: 2018-05-07

## 2018-05-04 RX ORDER — GABAPENTIN 400 MG/1
300 CAPSULE ORAL
Qty: 0 | Refills: 0 | Status: DISCONTINUED | OUTPATIENT
Start: 2018-05-04 | End: 2018-05-14

## 2018-05-04 RX ORDER — ASPIRIN/CALCIUM CARB/MAGNESIUM 324 MG
81 TABLET ORAL DAILY
Qty: 0 | Refills: 0 | Status: DISCONTINUED | OUTPATIENT
Start: 2018-05-04 | End: 2018-05-07

## 2018-05-04 RX ORDER — ISOSORBIDE DINITRATE 5 MG/1
30 TABLET ORAL THREE TIMES A DAY
Qty: 0 | Refills: 0 | Status: DISCONTINUED | OUTPATIENT
Start: 2018-05-04 | End: 2018-05-09

## 2018-05-04 RX ORDER — INSULIN GLARGINE 100 [IU]/ML
26 INJECTION, SOLUTION SUBCUTANEOUS EVERY MORNING
Qty: 0 | Refills: 0 | Status: DISCONTINUED | OUTPATIENT
Start: 2018-05-04 | End: 2018-05-14

## 2018-05-04 RX ORDER — LOSARTAN POTASSIUM 100 MG/1
50 TABLET, FILM COATED ORAL DAILY
Qty: 0 | Refills: 0 | Status: DISCONTINUED | OUTPATIENT
Start: 2018-05-04 | End: 2018-05-04

## 2018-05-04 RX ORDER — ERYTHROPOIETIN 10000 [IU]/ML
10000 INJECTION, SOLUTION INTRAVENOUS; SUBCUTANEOUS
Qty: 0 | Refills: 0 | Status: DISCONTINUED | OUTPATIENT
Start: 2018-05-04 | End: 2018-05-09

## 2018-05-04 RX ORDER — INSULIN GLARGINE 100 [IU]/ML
10 INJECTION, SOLUTION SUBCUTANEOUS
Qty: 0 | Refills: 0 | COMMUNITY

## 2018-05-04 RX ORDER — METOPROLOL TARTRATE 50 MG
50 TABLET ORAL
Qty: 0 | Refills: 0 | Status: DISCONTINUED | OUTPATIENT
Start: 2018-05-04 | End: 2018-05-14

## 2018-05-04 RX ORDER — NIFEDIPINE 30 MG
60 TABLET, EXTENDED RELEASE 24 HR ORAL DAILY
Qty: 0 | Refills: 0 | Status: DISCONTINUED | OUTPATIENT
Start: 2018-05-04 | End: 2018-05-09

## 2018-05-04 RX ORDER — ASPIRIN/CALCIUM CARB/MAGNESIUM 324 MG
1 TABLET ORAL
Qty: 0 | Refills: 0 | COMMUNITY

## 2018-05-04 RX ORDER — FUROSEMIDE 40 MG
40 TABLET ORAL
Qty: 0 | Refills: 0 | Status: DISCONTINUED | OUTPATIENT
Start: 2018-05-04 | End: 2018-05-06

## 2018-05-04 RX ORDER — ISOSORBIDE MONONITRATE 60 MG/1
3 TABLET, EXTENDED RELEASE ORAL
Qty: 0 | Refills: 0 | COMMUNITY

## 2018-05-04 RX ORDER — GABAPENTIN 400 MG/1
1 CAPSULE ORAL
Qty: 0 | Refills: 0 | COMMUNITY

## 2018-05-04 RX ORDER — ATORVASTATIN CALCIUM 80 MG/1
20 TABLET, FILM COATED ORAL AT BEDTIME
Qty: 0 | Refills: 0 | Status: DISCONTINUED | OUTPATIENT
Start: 2018-05-04 | End: 2018-05-14

## 2018-05-04 RX ORDER — GABAPENTIN 400 MG/1
300 CAPSULE ORAL THREE TIMES A DAY
Qty: 0 | Refills: 0 | Status: DISCONTINUED | OUTPATIENT
Start: 2018-05-04 | End: 2018-05-04

## 2018-05-04 RX ADMIN — Medication 2: at 13:43

## 2018-05-04 RX ADMIN — Medication 50 MILLIGRAM(S): at 05:41

## 2018-05-04 RX ADMIN — Medication 40 MILLIGRAM(S): at 17:17

## 2018-05-04 RX ADMIN — ISOSORBIDE DINITRATE 30 MILLIGRAM(S): 5 TABLET ORAL at 21:40

## 2018-05-04 RX ADMIN — INSULIN GLARGINE 8 UNIT(S): 100 INJECTION, SOLUTION SUBCUTANEOUS at 21:39

## 2018-05-04 RX ADMIN — ISOSORBIDE DINITRATE 30 MILLIGRAM(S): 5 TABLET ORAL at 13:43

## 2018-05-04 RX ADMIN — ATORVASTATIN CALCIUM 20 MILLIGRAM(S): 80 TABLET, FILM COATED ORAL at 21:40

## 2018-05-04 RX ADMIN — HEPARIN SODIUM 5000 UNIT(S): 5000 INJECTION INTRAVENOUS; SUBCUTANEOUS at 05:41

## 2018-05-04 RX ADMIN — Medication 60 MILLIGRAM(S): at 10:27

## 2018-05-04 RX ADMIN — CLOPIDOGREL BISULFATE 75 MILLIGRAM(S): 75 TABLET, FILM COATED ORAL at 13:43

## 2018-05-04 RX ADMIN — INSULIN GLARGINE 26 UNIT(S): 100 INJECTION, SOLUTION SUBCUTANEOUS at 08:43

## 2018-05-04 RX ADMIN — HEPARIN SODIUM 5000 UNIT(S): 5000 INJECTION INTRAVENOUS; SUBCUTANEOUS at 17:17

## 2018-05-04 RX ADMIN — LOSARTAN POTASSIUM 50 MILLIGRAM(S): 100 TABLET, FILM COATED ORAL at 10:28

## 2018-05-04 RX ADMIN — ERYTHROPOIETIN 10000 UNIT(S): 10000 INJECTION, SOLUTION INTRAVENOUS; SUBCUTANEOUS at 17:26

## 2018-05-04 RX ADMIN — Medication 10 MILLIGRAM(S): at 00:22

## 2018-05-04 RX ADMIN — GABAPENTIN 300 MILLIGRAM(S): 400 CAPSULE ORAL at 05:41

## 2018-05-04 RX ADMIN — GABAPENTIN 300 MILLIGRAM(S): 400 CAPSULE ORAL at 17:17

## 2018-05-04 RX ADMIN — ISOSORBIDE DINITRATE 30 MILLIGRAM(S): 5 TABLET ORAL at 05:41

## 2018-05-04 RX ADMIN — Medication 50 MILLIGRAM(S): at 17:17

## 2018-05-04 RX ADMIN — Medication 81 MILLIGRAM(S): at 13:43

## 2018-05-04 NOTE — PROGRESS NOTE ADULT - PROBLEM SELECTOR PLAN 5
Chronic.   - Continue home medications with hold parameters.   - Cardio recs Lasix 40 IVP q12H Chronic.   home meds/ metoprolol , Nifedipine , lasix , add hydralazine if needed , dc losartan as with jerri.

## 2018-05-04 NOTE — ED ADULT NURSE REASSESSMENT NOTE - NS ED NURSE REASSESS COMMENT FT1
Pt. noted with HR 60. Per Dr. Casarez procardia and isordil rescheduled to 10am today due to pt. schedule for hemodialysis. Lasix held for heart rate per Dr. Casarez.
 phone used for AM medications. Name: [employer] ID: [id number]
Diaper changed.
Diaper changed.

## 2018-05-04 NOTE — PROGRESS NOTE ADULT - PROBLEM SELECTOR PLAN 2
Unknown baseline, contact PMD (Dr. Everardo Black, [facility]) in AM for baseline Hg; Likely anemia of chronic disease given CKD.   f/u iron studies  f/u FOBT Likely anemia of chronic disease given CKD.   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

## 2018-05-04 NOTE — PROGRESS NOTE ADULT - PROBLEM SELECTOR PLAN 1
CKD, previous refusal of dialysis. Now with hyperkalemia, s/p kayexalate and Albuterol  Dr. Albarran (Renal) consulted  in need of dialysis jerri on CKD ? , previous refusal of dialysis out pt  / with hyperkalemia, s/p kayexalate and Albuterol - repeat k wnl   Dr. Albarran (Renal) consulted , renal sono today- might   need of dialysis fu cr weekend

## 2018-05-04 NOTE — PROGRESS NOTE ADULT - PROBLEM SELECTOR PLAN 7
hx MI 2007 s/p stents   - No acute EKG changes, no elevation in trops   - Continue clopidogrel  - Cardio (Dr. Mosher) consulted. hx MI 2007 s/p stents   - No acute EKG change  - Continue clopidogrel ,asa , statin   - Cardio (Dr. Mosher) consulted.

## 2018-05-04 NOTE — PROVIDER CONTACT NOTE (OTHER) - ACTION/TREATMENT ORDERED:
Pt to be seen by nephyohana Guzmán who will decide about access and Hd tx. Pt is cleared to go to floor by MD Sparks. Md Sparks verbalized she will see pt on assigned unit

## 2018-05-04 NOTE — PROGRESS NOTE ADULT - PROBLEM SELECTOR PLAN 4
likely MSK  f/u 2nd set of trops  Dr. Mena (Cardio) consulted like atypical as per   Dr. Mena (Cardio) . high troponin increase demand ischemia sec to chf . pt have hx cad 2 stent 2007 on asa/ Plavix statin

## 2018-05-04 NOTE — PROGRESS NOTE ADULT - PROBLEM SELECTOR PLAN 6
Chronic.   - low dose ISS with accuchecks, hypoglycemic protocol  - Home lantus dose (30 AM, 10 PM) adjusted to (26 AM, 8 qhs)  - Diet: consistent carb type2 , fu hba1c   - low dose ISS with accuchecks, hypoglycemic protocol  - Home lantus dose (30 AM, 10 PM) adjusted to (26 AM, 8 qhs)  - Diet: consistent carb

## 2018-05-04 NOTE — PROGRESS NOTE ADULT - ATTENDING COMMENTS
Pt with acute renal failure, transferred from [place].  Will need HD.  Nephro already consulted, will see Pt in the Am and will llikely start HD.  Pt was in respiratory distress, Lasix helped relieve breathing issue.  Unclear anemia etiology, but possibly b/c of renal disease.    Cardio consulted 2/2 chest pain, initial workup negative.  Trend trops. Pt with acute renal failure, transferred from [place].  Will need HD.  found to have also  acute on chr diastolic chf  - on iv lasix , daily wt , strict is and os , low salt diet / echo . jerri on ckd ? to get old lab by family requested , dc losartan , renal sono ,  as per dr anderson  if cr weekend dose not improve will need hd Monday / will get access by ir monday . high troponin secondary to demand ischemia from acute chf  as per cardio francisco . plan fu lab in am , physical therapy  , family bed side aware all plan .

## 2018-05-04 NOTE — CONSULT NOTE ADULT - ASSESSMENT
·	? MIRNA, CKD 4/5  ·	CHF  ·	Anemia  ·	Hyperkalemia  ·	Diabetes  ·	Hypertension    IV diuresis. Will d/c losartan. Check repeat labs and monitor renal function trend. Labs as ordered. Get renal sonogram.   Avoid nephrotoxic meds as possible. Avoid ACEI, ARB and NSAIDS. Monitor input and output.   Monitor h/h trend. Check iron studies if not done recently. Transfuse PRBC's PRN.   Monitor BP trend. Titrate BP meds as needed. Salt restriction. Family were available for translation.   Monitor blood sugar levels. Insulin coverage as needed. Dietary restriction. Pt / family were informed of possible need for HD this admission.  Will follow electrolytes and renal function trend. Further recommendations pending clinical course. Thank you for the courtesy of this referral.

## 2018-05-04 NOTE — PROGRESS NOTE ADULT - ASSESSMENT
68F PMHx CAD (3 stents 2007), DM type 2 on insulin, Peripheral neuropathy, HTN transferred from [place] ED for hemodialysis due to electrolyte abnormalities, shortness of breath, chest pain. 68F PMHx CAD (3 stents 2007), DM type 2 on insulin, Peripheral neuropathy, HTN transferred from [place] ED for hemodialysis due to electrolyte abnormalities, shortness of breath sec to acute on chr diastolic chf , chest pain possible atypical  .

## 2018-05-04 NOTE — PROGRESS NOTE ADULT - SUBJECTIVE AND OBJECTIVE BOX
Chief Complaint: Chest pain, shortness of breath    Interval Events: No more chest pain. Breathing improved.    Review of Systems:  General: No fevers, chills, weight loss or gain  Skin: No rashes, color changes  Cardiovascular: No chest pain, orthopnea  Respiratory: No shortness of breath, cough  Gastrointestinal: No nausea, abdominal pain  Genitourinary: No incontinence, pain with urination  Musculoskeletal: No pain, swelling, decreased range of motion  Neurological: No headache, weakness  Psychiatric: No depression, anxiety  Endocrine: No weight loss or gain, increased thirst  All other systems are comprehensively negative.    Physical Exam:  Vitals:        Vital Signs Last 24 Hrs  T(C): 36.6 (04 May 2018 05:39), Max: 36.9 (03 May 2018 20:37)  T(F): 97.8 (04 May 2018 05:39), Max: 98.5 (03 May 2018 20:37)  HR: 60 (04 May 2018 06:47) (60 - 76)  BP: 129/41 (04 May 2018 06:47) (129/41 - 185/69)  BP(mean): 70 (04 May 2018 06:47) (70 - 82)  RR: 15 (04 May 2018 06:47) (15 - 19)  SpO2: 94% (04 May 2018 06:47) (94% - 100%)  General: NAD  HEENT: MMM  Neck: No JVD, no carotid bruit  Lungs: CTAB  CV: RRR, nl S1/S2, no M/R/G  Abdomen: S/NT/ND, +BS  Extremities: Trace LE edema, no cyanosis  Neuro: AAOx3, non-focal  Skin: No rash    Labs:                        7.4    10.3  )-----------( 220      ( 04 May 2018 06:38 )             23.9     05-04    147<H>  |  113<H>  |  69<H>  ----------------------------<  112<H>  4.9   |  23  |  5.00<H>    Ca    9.3      04 May 2018 06:38    TPro  7.5  /  Alb  2.9<L>  /  TBili  0.3  /  DBili  x   /  AST  11<L>  /  ALT  12  /  AlkPhos  69  05-04    CARDIAC MARKERS ( 04 May 2018 06:38 )  .129 ng/mL / x     / 48 U/L / x     / 2.3 ng/mL  CARDIAC MARKERS ( 04 May 2018 01:25 )  .159 ng/mL / x     / 52 U/L / x     / 2.3 ng/mL          Telemetry: Sinus rhythm

## 2018-05-04 NOTE — PROGRESS NOTE ADULT - PROBLEM SELECTOR PLAN 3
with CXR showing pulmonary vascular congestion.   fluid overload, s/p lasix 40x2, in need of HD  f/u Echo  proBNP elevated, likely component due to renal failure  Dr. Mena (Cardio) consulted proBNP elevated  sec to  acute on chr diastolic , iv lasix daily wt , strict /os   Dr. Mena (Cardio) consulted

## 2018-05-04 NOTE — ED ADULT NURSE REASSESSMENT NOTE - COMFORT CARE
assisted to bedside commode
darkened lights/repositioned/side rails up
po fluids offered/repositioned/side rails up
repositioned/darkened lights/side rails up/assisted to bedside commode
assisted to bedside commode/darkened lights
side rails up
side rails up/darkened lights

## 2018-05-04 NOTE — PHYSICAL THERAPY INITIAL EVALUATION ADULT - PERTINENT HX OF CURRENT PROBLEM, REHAB EVAL
68F transferred from [facility] for HD with progressive SOB and CP x 2 days. SOB is made worse with activity and lying flat at night. Pt has been told by nephrologist she needs dialysis but has refused; however consents to dialysis today. Patients admits to fatigue, lightheadedness, cp, palpitations, SOB, lower extremity edema. CXR showing increased pulmonary vascular congestion.

## 2018-05-04 NOTE — PROGRESS NOTE ADULT - SUBJECTIVE AND OBJECTIVE BOX
Patient is a 68y old  Female who presents with a chief complaint of SOB, mild chest pain, ESRD in need of dialysis (03 May 2018 23:24)      HPI:  68F PMHx CAD (3 2007), DM type 2 on insulin, Peripheral neuropathy, HTN, CKD now ESRD transferred from [facility] for HD. Presented to [facility] with progressive SOB and CP x 2 days, mildly relieved by Nitroglycerin. Patient's grandson provided translation. Per patient, she has had progressive SOB and mild cp over the past week. She has no prior history of a similar complaint. The SOB is made worse with activity and lying flat at night. She describes the chest pain and a dull pain without radiation. It is rated 3/10. She denies recent travel or sick contacts. Per grandson, patient has been told by nephrologist she needs dialysis but has refused; however consents to dialysis today. Patients admits to fatigue, lightheadedness, cp, palpitations, SOB, lower extremity edema. She denies fever, chills, N/V/C/D, abdominal pain, numbness/tingling in digits.         INTERVAL HPI/OVERNIGHT EVENTS:  T(C): 36.7 (18 @ 11:47), Max: 37.1 (18 @ 16:56)  HR: 64 (18 @ 11:47) (60 - 85)  BP: 155/52 (18 @ 11:47) (129/41 - 188/79)  RR: 16 (18 @ 11:47) (15 - 20)  SpO2: 99% (18 @ 11:47) (94% - 100%)  Wt(kg): --  I&O's Summary      REVIEW OF SYSTEMS:  CONSTITUTIONAL: No fever, weight loss, or fatigue  EYES: No eye pain, visual disturbances, or discharge  ENMT:  No difficulty hearing, tinnitus, vertigo; No sinus or throat pain  NECK: No pain or stiffness  BREASTS: No pain, no masses,   RESPIRATORY: No cough, wheezing, chills or hemoptysis; No shortness of breath  CARDIOVASCULAR: No chest pain, palpitations, dizziness, or leg swelling  GASTROINTESTINAL: No abdominal or epigastric pain. No nausea, vomiting, or hematemesis; No diarrhea or constipation. No melena or hematochezia.  GENITOURINARY: No dysuria, frequency, hematuria, or incontinence  NEUROLOGICAL: No headaches, memory loss, loss of strength, numbness, or tremors  SKIN: No itching, burning, rashes, or lesions   LYMPH NODES: No enlarged glands  MUSCULOSKELETAL: No joint pain or swelling; No muscle, back, or extremity pain  PSYCHIATRIC: No depression, anxiety, mood swings, or difficulty sleeping  ALLERY No hives or eczema    PHYSICAL EXAM:  GENERAL: NAD, well-groomed, well-developed  HEAD:  Atraumatic, Normocephalic  EYES: EOMI, PERRLA, conjunctiva and sclera clear  ENMT: No tonsillar erythema, exudates, or enlargement; Moist mucous membranes, Good dentition, No lesions  NECK: Supple, No JVD, Normal thyroid  NERVOUS SYSTEM:  Alert & Oriented X3, Good concentration; Motor Strength 5/5 B/L upper and lower extremities; DTRs 2+ intact and symmetric  CHEST/LUNG: Clear to percussion bilaterally; No rales, rhonchi, wheezing, or rubs  HEART: Regular rate and rhythm; No murmurs, rubs, or gallops  ABDOMEN: Soft, Nontender, Nondistended; Bowel sounds present  EXTREMITIES:  2+ Peripheral Pulses, No clubbing, cyanosis, or edema  LYMPH: No lymphadenopathy noted  SKIN: No rashes or lesions    MEDICATIONS  (STANDING):  aspirin enteric coated 81 milliGRAM(s) Oral daily  clopidogrel Tablet 75 milliGRAM(s) Oral daily  dextrose 5%. 1000 milliLiter(s) (50 mL/Hr) IV Continuous <Continuous>  dextrose 50% Injectable 12.5 Gram(s) IV Push once  dextrose 50% Injectable 25 Gram(s) IV Push once  dextrose 50% Injectable 25 Gram(s) IV Push once  furosemide   Injectable 40 milliGRAM(s) IV Push two times a day  gabapentin 300 milliGRAM(s) Oral three times a day  heparin  Injectable 5000 Unit(s) SubCutaneous every 12 hours  insulin glargine Injectable (LANTUS) 8 Unit(s) SubCutaneous at bedtime  insulin glargine Injectable (LANTUS) 26 Unit(s) SubCutaneous every morning  insulin lispro (HumaLOG) corrective regimen sliding scale   SubCutaneous three times a day before meals  isosorbide   dinitrate Tablet (ISORDIL) 30 milliGRAM(s) Oral three times a day  losartan 50 milliGRAM(s) Oral daily  metoprolol tartrate 50 milliGRAM(s) Oral two times a day  NIFEdipine XL 60 milliGRAM(s) Oral daily    MEDICATIONS  (PRN):  dextrose Gel 1 Dose(s) Oral once PRN Blood Glucose LESS THAN 70 milliGRAM(s)/deciliter  glucagon  Injectable 1 milliGRAM(s) IntraMuscular once PRN Glucose LESS THAN 70 milligrams/deciliter      LABS:                        7.4    10.3  )-----------( 220      ( 04 May 2018 06:38 )             23.9     05-04    147<H>  |  113<H>  |  69<H>  ----------------------------<  112<H>  4.9   |  23  |  5.00<H>    Ca    9.3      04 May 2018 06:38    TPro  7.5  /  Alb  2.9<L>  /  TBili  0.3  /  DBili  x   /  AST  11<L>  /  ALT  12  /  AlkPhos  69  05-04    PT/INR - ( 03 May 2018 17:53 )   PT: 13.3 sec;   INR: 1.22 ratio         PTT - ( 03 May 2018 17:53 )  PTT:27.4 sec  Urinalysis Basic - ( 03 May 2018 19:23 )    Color: Yellow / Appearance: Clear / S.010 / pH: x  Gluc: x / Ketone: Negative  / Bili: Negative / Urobili: Negative mg/dL   Blood: x / Protein: 100 mg/dL / Nitrite: Negative   Leuk Esterase: Negative / RBC: 3-5 /HPF / WBC 0-2   Sq Epi: x / Non Sq Epi: Few / Bacteria: Few      CAPILLARY BLOOD GLUCOSE      POCT Blood Glucose.: 202 mg/dL (04 May 2018 12:04)  POCT Blood Glucose.: 147 mg/dL (04 May 2018 08:33)              RADIOLOGY & ADDITIONAL TESTS:    Imaging Personally Reviewed:       Advance Directives:      Palliative Care: Patient is a 68y old  Female who presents with a chief complaint of SOB, mild chest pain, ESRD in need of dialysis (03 May 2018 23:24)      HPI:  68F PMHx CAD (3 stents ), DM type 2 on insulin, Peripheral neuropathy, HTN, CKD now ESRD transferred from [facility] for HD. Presented to [facility] with progressive SOB and CP x 2 days, mildly relieved by Nitroglycerin. Patient's grandson provided translation. Per patient, she has had progressive SOB and mild cp over the past week. She has no prior history of a similar complaint. The SOB is made worse with activity and lying flat at night. She describes the chest pain and a dull pain without radiation. It is rated 3/10. She denies recent travel or sick contacts. Per grandson, patient has been told by nephrologist she needs dialysis but has refused; however consents to dialysis today. Patients admits to fatigue, lightheadedness, cp, palpitations, SOB, lower extremity edema. She denies fever, chills, N/V/C/D, abdominal pain, numbness/tingling in digits.    pt admitted cp r/o acs  and sob secondary to acute on chr diastolic chf , jerri on ckd  . pt seen and examine alert awake  state feeling better today  no cp but sob still present , voiding , no fever , no distress.       INTERVAL HPI/OVERNIGHT EVENTS:  T(C): 36.7 (18 @ 11:47), Max: 37.1 (18 @ 16:56)  HR: 64 (18 @ 11:47) (60 - 85)  BP: 155/52 (18 @ 11:47) (129/41 - 188/79)  RR: 16 (18 @ 11:47) (15 - 20)  SpO2: 99% (18 @ 11:47) (94% - 100%)  Wt(kg): --  I&O's Summary      REVIEW OF SYSTEMS:  CONSTITUTIONAL: No fever, weight loss, yes fatigue  EYES: No eye pain, visual disturbances, or discharge  ENMT:   No sinus or throat pain  NECK: No pain or stiffness  BREASTS: No pain, no masses,   RESPIRATORY: No cough, wheezing, yes  shortness of breath  CARDIOVASCULAR: No chest pain, palpitations, dizziness, yes bl  leg swelling  GASTROINTESTINAL: No abdominal or epigastric pain. No nausea, vomiting, ; No diarrhea or constipation.   GENITOURINARY: No dysuria, frequency,   NEUROLOGICAL: No headaches, memory loss, loss of strength, numbness, or tremors  SKIN: No itching, burning, rashes, or lesions   MUSCULOSKELETAL: No joint pain or swelling; No muscle, back, or extremity pain      PHYSICAL EXAM:  GENERAL: NAD, well-groomed, well-developed  HEAD:  Atraumatic, Normocephalic  EYES: EOMI, PERRLA, conjunctiva and sclera clear  ENMT:  Moist mucous membranes, No lesions  NECK: Supple, No JVD,   NERVOUS SYSTEM:  Alert & Oriented X3, Good concentration; Motor Strength 5/5 B/L upper and lower extremities; DTRs 2+ intact and symmetric  CHEST/LUNG: + percussion bilaterally bl rales,  no rhonchi, no wheezing,  HEART: Regular rate and rhythm; No murmurs, no tachy   ABDOMEN: Soft,  obese Nontender, Nondistended; Bowel sounds present  EXTREMITIES:  2+ Peripheral Pulses, No clubbing, cyanosis, yes bl low ext pitting  edema  SKIN: No rashes or lesions    MEDICATIONS  (STANDING):  aspirin enteric coated 81 milliGRAM(s) Oral daily  clopidogrel Tablet 75 milliGRAM(s) Oral daily  dextrose 5%. 1000 milliLiter(s) (50 mL/Hr) IV Continuous <Continuous>  dextrose 50% Injectable 12.5 Gram(s) IV Push once  dextrose 50% Injectable 25 Gram(s) IV Push once  dextrose 50% Injectable 25 Gram(s) IV Push once  furosemide   Injectable 40 milliGRAM(s) IV Push two times a day  gabapentin 300 milliGRAM(s) Oral three times a day  heparin  Injectable 5000 Unit(s) SubCutaneous every 12 hours  insulin glargine Injectable (LANTUS) 8 Unit(s) SubCutaneous at bedtime  insulin glargine Injectable (LANTUS) 26 Unit(s) SubCutaneous every morning  insulin lispro (HumaLOG) corrective regimen sliding scale   SubCutaneous three times a day before meals  isosorbide   dinitrate Tablet (ISORDIL) 30 milliGRAM(s) Oral three times a day  losartan 50 milliGRAM(s) Oral daily  metoprolol tartrate 50 milliGRAM(s) Oral two times a day  NIFEdipine XL 60 milliGRAM(s) Oral daily    MEDICATIONS  (PRN):  dextrose Gel 1 Dose(s) Oral once PRN Blood Glucose LESS THAN 70 milliGRAM(s)/deciliter  glucagon  Injectable 1 milliGRAM(s) IntraMuscular once PRN Glucose LESS THAN 70 milligrams/deciliter      LABS:                        7.4    10.3  )-----------( 220      ( 04 May 2018 06:38 )             23.9     05-04    147<H>  |  113<H>  |  69<H>  ----------------------------<  112<H>  4.9   |  23  |  5.00<H>    Ca    9.3      04 May 2018 06:38    TPro  7.5  /  Alb  2.9<L>  /  TBili  0.3  /  DBili  x   /  AST  11<L>  /  ALT  12  /  AlkPhos  69  05-04    PT/INR - ( 03 May 2018 17:53 )   PT: 13.3 sec;   INR: 1.22 ratio         PTT - ( 03 May 2018 17:53 )  PTT:27.4 sec  Urinalysis Basic - ( 03 May 2018 19:23 )    Color: Yellow / Appearance: Clear / S.010 / pH: x  Gluc: x / Ketone: Negative  / Bili: Negative / Urobili: Negative mg/dL   Blood: x / Protein: 100 mg/dL / Nitrite: Negative   Leuk Esterase: Negative / RBC: 3-5 /HPF / WBC 0-2   Sq Epi: x / Non Sq Epi: Few / Bacteria: Few      CAPILLARY BLOOD GLUCOSE      POCT Blood Glucose.: 202 mg/dL (04 May 2018 12:04)  POCT Blood Glucose.: 147 mg/dL (04 May 2018 08:33)              RADIOLOGY & ADDITIONAL TESTS:    Imaging Personally Reviewed:     IMPRESSION: Increased pulmonary vascular congestion noted. No gross   consolidation is seen.         pending echo   Advance Directives:    full code

## 2018-05-04 NOTE — PROGRESS NOTE ADULT - ASSESSMENT
The patient is a 68 year old female with a history of HTN, DM, CAD s/p PCI in 2007, ESRD not on HD who presents with chest pain and shortness of breath.    Plan:  - Chest pain likely musculoskeletal in etiology given reproducibility of pain. Currently pain free.  - Troponin mildly elevated at 0.159 and trended down. Likely due to acute diastolic heart failure and retention of enzymes from ESRD. No need to trend further.  - Check echocardiogram  - Continue aspirin, clopidogrel for CAD  - Continue losartan, nifedipine, metoprolol, isordil for HTN  - Continue furosemide 40 mg IV q12h  - Renal eval. Patient may be willing to undergo dialysis now.

## 2018-05-05 LAB
ALBUMIN SERPL ELPH-MCNC: 2.6 G/DL — LOW (ref 3.3–5)
ALP SERPL-CCNC: 61 U/L — SIGNIFICANT CHANGE UP (ref 40–120)
ALT FLD-CCNC: 9 U/L — LOW (ref 12–78)
ANION GAP SERPL CALC-SCNC: 7 MMOL/L — SIGNIFICANT CHANGE UP (ref 5–17)
AST SERPL-CCNC: 8 U/L — LOW (ref 15–37)
BASOPHILS # BLD AUTO: 0.1 K/UL — SIGNIFICANT CHANGE UP (ref 0–0.2)
BASOPHILS NFR BLD AUTO: 0.8 % — SIGNIFICANT CHANGE UP (ref 0–2)
BILIRUB SERPL-MCNC: 0.2 MG/DL — SIGNIFICANT CHANGE UP (ref 0.2–1.2)
BUN SERPL-MCNC: 72 MG/DL — HIGH (ref 7–23)
CALCIUM SERPL-MCNC: 8.4 MG/DL — LOW (ref 8.5–10.1)
CALCIUM SERPL-MCNC: 8.5 MG/DL — SIGNIFICANT CHANGE UP (ref 8.4–10.5)
CHLORIDE SERPL-SCNC: 109 MMOL/L — HIGH (ref 96–108)
CO2 SERPL-SCNC: 26 MMOL/L — SIGNIFICANT CHANGE UP (ref 22–31)
CREAT SERPL-MCNC: 5.2 MG/DL — HIGH (ref 0.5–1.3)
CULTURE RESULTS: NO GROWTH — SIGNIFICANT CHANGE UP
EOSINOPHIL # BLD AUTO: 0.3 K/UL — SIGNIFICANT CHANGE UP (ref 0–0.5)
EOSINOPHIL NFR BLD AUTO: 3 % — SIGNIFICANT CHANGE UP (ref 0–6)
FERRITIN SERPL-MCNC: 184 NG/ML — HIGH (ref 15–150)
GLUCOSE SERPL-MCNC: 109 MG/DL — HIGH (ref 70–99)
HCT VFR BLD CALC: 21.9 % — LOW (ref 34.5–45)
HCT VFR BLD CALC: 22.2 % — LOW (ref 34.5–45)
HGB BLD-MCNC: 7 G/DL — CRITICAL LOW (ref 11.5–15.5)
HGB BLD-MCNC: 7.1 G/DL — LOW (ref 11.5–15.5)
IRON SATN MFR SERPL: 18 % — SIGNIFICANT CHANGE UP (ref 14–50)
IRON SATN MFR SERPL: 36 UG/DL — SIGNIFICANT CHANGE UP (ref 30–160)
LYMPHOCYTES # BLD AUTO: 3.6 K/UL — HIGH (ref 1–3.3)
LYMPHOCYTES # BLD AUTO: 36.1 % — SIGNIFICANT CHANGE UP (ref 13–44)
MCHC RBC-ENTMCNC: 27.2 PG — SIGNIFICANT CHANGE UP (ref 27–34)
MCHC RBC-ENTMCNC: 31.8 GM/DL — LOW (ref 32–36)
MCV RBC AUTO: 85.5 FL — SIGNIFICANT CHANGE UP (ref 80–100)
MONOCYTES # BLD AUTO: 0.6 K/UL — SIGNIFICANT CHANGE UP (ref 0–0.9)
MONOCYTES NFR BLD AUTO: 6.1 % — SIGNIFICANT CHANGE UP (ref 1–9)
NEUTROPHILS # BLD AUTO: 5.4 K/UL — SIGNIFICANT CHANGE UP (ref 1.8–7.4)
NEUTROPHILS NFR BLD AUTO: 53.9 % — SIGNIFICANT CHANGE UP (ref 43–77)
PHOSPHATE SERPL-MCNC: 5.5 MG/DL — HIGH (ref 2.5–4.5)
PLATELET # BLD AUTO: 218 K/UL — SIGNIFICANT CHANGE UP (ref 150–400)
POTASSIUM SERPL-MCNC: 4.7 MMOL/L — SIGNIFICANT CHANGE UP (ref 3.5–5.3)
POTASSIUM SERPL-SCNC: 4.7 MMOL/L — SIGNIFICANT CHANGE UP (ref 3.5–5.3)
PROT SERPL-MCNC: 6.4 G/DL — SIGNIFICANT CHANGE UP (ref 6–8.3)
PROT SERPL-MCNC: 6.4 G/DL — SIGNIFICANT CHANGE UP (ref 6–8.3)
PROT SERPL-MCNC: 6.9 G/DL — SIGNIFICANT CHANGE UP (ref 6–8.3)
PTH-INTACT FLD-MCNC: 159 PG/ML — HIGH (ref 15–65)
RBC # BLD: 2.56 M/UL — LOW (ref 3.8–5.2)
RBC # FLD: 14.8 % — HIGH (ref 10.3–14.5)
SODIUM SERPL-SCNC: 142 MMOL/L — SIGNIFICANT CHANGE UP (ref 135–145)
SPECIMEN SOURCE: SIGNIFICANT CHANGE UP
TIBC SERPL-MCNC: 195 UG/DL — LOW (ref 220–430)
UIBC SERPL-MCNC: 159 UG/DL — SIGNIFICANT CHANGE UP (ref 110–370)
URATE SERPL-MCNC: 8.5 MG/DL — HIGH (ref 2.5–7)
WBC # BLD: 10 K/UL — SIGNIFICANT CHANGE UP (ref 3.8–10.5)
WBC # FLD AUTO: 10 K/UL — SIGNIFICANT CHANGE UP (ref 3.8–10.5)

## 2018-05-05 PROCEDURE — 76775 US EXAM ABDO BACK WALL LIM: CPT | Mod: 26

## 2018-05-05 PROCEDURE — 99233 SBSQ HOSP IP/OBS HIGH 50: CPT

## 2018-05-05 RX ADMIN — Medication 50 MILLIGRAM(S): at 05:15

## 2018-05-05 RX ADMIN — ISOSORBIDE DINITRATE 30 MILLIGRAM(S): 5 TABLET ORAL at 21:46

## 2018-05-05 RX ADMIN — ATORVASTATIN CALCIUM 20 MILLIGRAM(S): 80 TABLET, FILM COATED ORAL at 21:46

## 2018-05-05 RX ADMIN — Medication 1: at 12:01

## 2018-05-05 RX ADMIN — HEPARIN SODIUM 5000 UNIT(S): 5000 INJECTION INTRAVENOUS; SUBCUTANEOUS at 05:14

## 2018-05-05 RX ADMIN — Medication 81 MILLIGRAM(S): at 12:01

## 2018-05-05 RX ADMIN — Medication 40 MILLIGRAM(S): at 18:12

## 2018-05-05 RX ADMIN — GABAPENTIN 300 MILLIGRAM(S): 400 CAPSULE ORAL at 05:15

## 2018-05-05 RX ADMIN — ISOSORBIDE DINITRATE 30 MILLIGRAM(S): 5 TABLET ORAL at 05:14

## 2018-05-05 RX ADMIN — Medication 60 MILLIGRAM(S): at 05:15

## 2018-05-05 RX ADMIN — Medication 40 MILLIGRAM(S): at 05:15

## 2018-05-05 RX ADMIN — INSULIN GLARGINE 26 UNIT(S): 100 INJECTION, SOLUTION SUBCUTANEOUS at 08:51

## 2018-05-05 RX ADMIN — Medication 50 MILLIGRAM(S): at 18:12

## 2018-05-05 RX ADMIN — ISOSORBIDE DINITRATE 30 MILLIGRAM(S): 5 TABLET ORAL at 14:21

## 2018-05-05 RX ADMIN — CLOPIDOGREL BISULFATE 75 MILLIGRAM(S): 75 TABLET, FILM COATED ORAL at 12:01

## 2018-05-05 RX ADMIN — GABAPENTIN 300 MILLIGRAM(S): 400 CAPSULE ORAL at 18:12

## 2018-05-05 NOTE — PROGRESS NOTE ADULT - PROBLEM SELECTOR PLAN 3
proBNP elevated  sec to  acute on chr diastolic , iv lasix daily wt , strict /os   Dr. Mena (Cardio) consulted

## 2018-05-05 NOTE — PROGRESS NOTE ADULT - PROBLEM SELECTOR PLAN 1
jerri on CKD ? , previous refusal of dialysis out pt  / with hyperkalemia, s/p kayexalate and Albuterol - repeat k wnl   Dr. Albarran (Renal) consulted , renal sono today- might   need of dialysis fu cr weekend

## 2018-05-05 NOTE — CHART NOTE - NSCHARTNOTEFT_GEN_A_CORE
Notified pt's Hb is 7.0.  Pt assymptomatic.  FOBT is still pending.  Pt is usually on epogen M and F.  Will hold heparin for now.  No signs of bleeding currently.  Will pass this over to day team if they want to consider transfusion.    Type and screen ordered.     Vital Signs Last 24 Hrs  T(C): 36.8 (05 May 2018 05:30), Max: 36.8 (05 May 2018 05:30)  T(F): 98.2 (05 May 2018 05:30), Max: 98.2 (05 May 2018 05:30)  HR: 70 (05 May 2018 05:30) (64 - 72)  BP: 146/84 (05 May 2018 05:30) (129/81 - 197/78)  BP(mean): --  RR: 18 (05 May 2018 05:30) (15 - 18)  SpO2: 98% (05 May 2018 05:30) (91% - 99%) Notified pt's Hb is 7.0.  Pt assymptomatic.  FOBT is still pending.  Pt is usually on epogen M and F.  Will hold heparin for now.  No signs of bleeding currently.  Will pass this over to day team if they want to consider transfusion.  Will check H and H in 4 hours.     Type and screen ordered.     Vital Signs Last 24 Hrs  T(C): 36.8 (05 May 2018 05:30), Max: 36.8 (05 May 2018 05:30)  T(F): 98.2 (05 May 2018 05:30), Max: 98.2 (05 May 2018 05:30)  HR: 70 (05 May 2018 05:30) (64 - 72)  BP: 146/84 (05 May 2018 05:30) (129/81 - 197/78)  BP(mean): --  RR: 18 (05 May 2018 05:30) (15 - 18)  SpO2: 98% (05 May 2018 05:30) (91% - 99%)

## 2018-05-05 NOTE — PROVIDER CONTACT NOTE (CRITICAL VALUE NOTIFICATION) - ACTION/TREATMENT ORDERED:
AM team to evaluate pt. Heparin SC D/C. Pt ambulated to bathroom this AM and over night. Denies any CP, SOB, palpitations, dizziness, headache. Will continue to monitor.

## 2018-05-05 NOTE — PROGRESS NOTE ADULT - ASSESSMENT
68F PMHx CAD (3 stents 2007), DM type 2 on insulin, Peripheral neuropathy, HTN transferred from [facility] for hemodialysis due to electrolyte abnormalities, shortness of breath sec to acute on chr diastolic chf , chest pain possible atypical  .

## 2018-05-05 NOTE — PROGRESS NOTE ADULT - PROBLEM SELECTOR PLAN 4
like atypical as per   Dr. Mena (Cardio) . high troponin increase demand ischemia sec to chf . pt have hx cad 2 stent 2007 on asa/ Plavix statin

## 2018-05-05 NOTE — PROGRESS NOTE ADULT - ASSESSMENT
Advanced CKD, anemia of CKD, r/o G.I. bleed. Resolving volume overload.  To continue with I.V. slow diuresis and OLIVA. F/u on stool for occult blood.  F/u on 2 DECHO.  May need pRBC if HGB drops further.  Will need pRBC before AVF surgery.  Will need cardiology clearance for AVF surgery on this admission.  Needs vascular surgery evaluation for AVF creation on this admission once volume status and HGB acceptable.  Spoke with family at bedside.

## 2018-05-05 NOTE — PROGRESS NOTE ADULT - SUBJECTIVE AND OBJECTIVE BOX
Patient seen in follow up for CKD, fluid overload. States that she is feeling better, less dyspnea, no chest pain, good appetite, no nausea.   She was previously followed by nephrologist in [place], was refusing AVF.    CAD (coronary artery disease)  Diabetes  CKD  Hypertension  Pneumonia  Myocardial infarction  Hypertension  Diabetes    MEDICATIONS  (STANDING):  aspirin enteric coated 81 milliGRAM(s) Oral daily  atorvastatin 20 milliGRAM(s) Oral at bedtime  clopidogrel Tablet 75 milliGRAM(s) Oral daily  dextrose 5%. 1000 milliLiter(s) (50 mL/Hr) IV Continuous <Continuous>  dextrose 50% Injectable 12.5 Gram(s) IV Push once  dextrose 50% Injectable 25 Gram(s) IV Push once  dextrose 50% Injectable 25 Gram(s) IV Push once  epoetin analilia Injectable 52157 Unit(s) SubCutaneous <User Schedule>  furosemide   Injectable 40 milliGRAM(s) IV Push two times a day  gabapentin 300 milliGRAM(s) Oral two times a day  insulin glargine Injectable (LANTUS) 8 Unit(s) SubCutaneous at bedtime  insulin glargine Injectable (LANTUS) 26 Unit(s) SubCutaneous every morning  insulin lispro (HumaLOG) corrective regimen sliding scale   SubCutaneous three times a day before meals  isosorbide   dinitrate Tablet (ISORDIL) 30 milliGRAM(s) Oral three times a day  metoprolol tartrate 50 milliGRAM(s) Oral two times a day  NIFEdipine XL 60 milliGRAM(s) Oral daily    MEDICATIONS  (PRN):  dextrose Gel 1 Dose(s) Oral once PRN Blood Glucose LESS THAN 70 milliGRAM(s)/deciliter  glucagon  Injectable 1 milliGRAM(s) IntraMuscular once PRN Glucose LESS THAN 70 milligrams/deciliter    T(C): 36.7 (05-05-18 @ 12:13), Max: 36.8 (05-05-18 @ 05:30)  HR: 68 (05-05-18 @ 12:13) (60 - 72)  BP: 154/67 (05-05-18 @ 12:13) (129/41 - 197/78)  RR: 16 (05-05-18 @ 12:13) (15 - 19)  SpO2: 98% (05-05-18 @ 12:13) (91% - 99%)  Wt(kg): --  I&O's Detail    04 May 2018 07:01  -  05 May 2018 07:00  --------------------------------------------------------  IN:    Oral Fluid: 200 mL  Total IN: 200 mL    OUT:    Voided: 400 mL  Total OUT: 400 mL    Total NET: -200 mL      PHYSICAL EXAM:  General: NAD, conversant  Respiratory: b/l air entry, rales at bases  Cardiovascular: S1 S2 reg, no rub  Gastrointestinal: soft, not tender, BS present  Extremities: 1 plus edema    CBC Full  -  ( 05 May 2018 10:13 )  WBC Count : x  Hemoglobin : 7.1 g/dL  Hematocrit : 22.2 %  Platelet Count - Automated : x  Mean Cell Volume : x  Mean Cell Hemoglobin : x  Mean Cell Hemoglobin Concentration : x  Auto Neutrophil # : x  Auto Lymphocyte # : x  Auto Monocyte # : x  Auto Eosinophil # : x  Auto Basophil # : x  Auto Neutrophil % : x  Auto Lymphocyte % : x  Auto Monocyte % : x  Auto Eosinophil % : x  Auto Basophil % : x    05-05    142  |  109<H>  |  72<H>  ----------------------------<  109<H>  4.7   |  26  |  5.20<H>    Ca    8.4<L>      05 May 2018 06:23  Phos  5.5     05-05    TPro  6.4  /  Alb  x   /  TBili  x   /  DBili  x   /  AST  x   /  ALT  x   /  AlkPhos  x   05-05        Sodium, Serum: 142 (05-05 @ 06:23)  Sodium, Serum: 147 (05-04 @ 06:38)  Sodium, Serum: 145 (05-04 @ 02:18)  Sodium, Serum: 140 (05-03 @ 17:53)    Creatinine, Serum: 5.20 (05-05 @ 06:23)  Creatinine, Serum: 5.00 (05-04 @ 06:38)  Creatinine, Serum: 5.00 (05-04 @ 02:18)  Creatinine, Serum: 4.88 (05-03 @ 17:53)    Potassium, Serum: 4.7 (05-05 @ 06:23)  Potassium, Serum: 4.9 (05-04 @ 06:38)  Potassium, Serum: 5.1 (05-04 @ 02:18)  Potassium, Serum: 5.8 (05-03 @ 17:53)    Hemoglobin: 7.1 (05-05 @ 10:13)  Hemoglobin: 7.0 (05-05 @ 06:23)  Hemoglobin: 7.4 (05-04 @ 06:38)  Hemoglobin: 7.7 (05-04 @ 02:33)  Hemoglobin: 7.5 (05-03 @ 17:53)

## 2018-05-05 NOTE — PROGRESS NOTE ADULT - PROBLEM SELECTOR PLAN 5
Chronic.   home meds/ metoprolol , Nifedipine , lasix , add hydralazine if needed , dc losartan as with jerri.

## 2018-05-05 NOTE — PROGRESS NOTE ADULT - SUBJECTIVE AND OBJECTIVE BOX
Patient is a 68y Female with a known history of :  Need for prophylactic measure (Z29.9)  CAD (coronary artery disease) (I25.10)  Diabetes (E11.9)  Hypertension (I10)  Chest pain, unspecified type (R07.9)  Shortness of breath (R06.02)  Anemia, unspecified type (D64.9)  Renal failure (N19)    HPI:  68F PMHx CAD (3 stents 2007), DM type 2 on insulin, Peripheral neuropathy, HTN, CKD now ESRD transferred from [place] [facility] for HD. Presented to [place] ED with progressive SOB and CP x 2 days, mildly relieved by Nitroglycerin. Patient's grandson provided translation. Per patient, she has had progressive SOB and mild cp over the past week. She has no prior history of a similar complaint. The SOB is made worse with activity and lying flat at night. She describes the chest pain and a dull pain without radiation. It is rated 3/10. She denies recent travel or sick contacts. Per grandson, patient has been told by nephrologist she needs dialysis but has refused; however consents to dialysis today. Patients admits to fatigue, lightheadedness, cp, palpitations, SOB, lower extremity edema. She denies fever, chills, N/V/C/D, abdominal pain, numbness/tingling in digits.     In the [place] ED Hg 7.5, K+ 5.8, Cr 4.88, lactate 0.5, proBNP 7456, trop negative x1.  UA grossly negative, RVP negative, CXR showing increased pulmonary vascular congestion. Dr. Mena (Cardio) consulted, CP likely MSK and MI to be ruled out with trops x2. Given , Lasix 40 x2, Nitro. Dr. Albarran (renal) paged.    Upon arrival to [facility] /80, T 98.5, HR 62, RR 16, SpO2 100% on O2, given Albuterol and Kayexalate. (03 May 2018 23:24)      REVIEW OF SYSTEMS:    CONSTITUTIONAL: No fever, weight loss, or fatigue  EYES: No eye pain, visual disturbances, or discharge  ENMT:  No difficulty hearing, tinnitus, vertigo; No sinus or throat pain  NECK: No pain or stiffness  BREASTS: No pain, masses, or nipple discharge  RESPIRATORY: No cough, wheezing, chills or hemoptysis; No shortness of breath  CARDIOVASCULAR: No chest pain, palpitations, dizziness, or leg swelling  GASTROINTESTINAL: No abdominal or epigastric pain. No nausea, vomiting, or hematemesis; No diarrhea or constipation. No melena or hematochezia.  GENITOURINARY: No dysuria, frequency, hematuria, or incontinence  NEUROLOGICAL: No headaches, memory loss, loss of strength, numbness, or tremors  SKIN: No itching, burning, rashes, or lesions   LYMPH NODES: No enlarged glands  ENDOCRINE: No heat or cold intolerance; No hair loss  MUSCULOSKELETAL: No joint pain or swelling; No muscle, back, or extremity pain  PSYCHIATRIC: No depression, anxiety, mood swings, or difficulty sleeping  HEME/LYMPH: No easy bruising, or bleeding gums  ALLERGY AND IMMUNOLOGIC: No hives or eczema    MEDICATIONS  (STANDING):  aspirin enteric coated 81 milliGRAM(s) Oral daily  atorvastatin 20 milliGRAM(s) Oral at bedtime  clopidogrel Tablet 75 milliGRAM(s) Oral daily  dextrose 5%. 1000 milliLiter(s) (50 mL/Hr) IV Continuous <Continuous>  dextrose 50% Injectable 12.5 Gram(s) IV Push once  dextrose 50% Injectable 25 Gram(s) IV Push once  dextrose 50% Injectable 25 Gram(s) IV Push once  epoetin analilia Injectable 30802 Unit(s) SubCutaneous <User Schedule>  furosemide   Injectable 40 milliGRAM(s) IV Push two times a day  gabapentin 300 milliGRAM(s) Oral two times a day  insulin glargine Injectable (LANTUS) 8 Unit(s) SubCutaneous at bedtime  insulin glargine Injectable (LANTUS) 26 Unit(s) SubCutaneous every morning  insulin lispro (HumaLOG) corrective regimen sliding scale   SubCutaneous three times a day before meals  isosorbide   dinitrate Tablet (ISORDIL) 30 milliGRAM(s) Oral three times a day  metoprolol tartrate 50 milliGRAM(s) Oral two times a day  NIFEdipine XL 60 milliGRAM(s) Oral daily    MEDICATIONS  (PRN):  dextrose Gel 1 Dose(s) Oral once PRN Blood Glucose LESS THAN 70 milliGRAM(s)/deciliter  glucagon  Injectable 1 milliGRAM(s) IntraMuscular once PRN Glucose LESS THAN 70 milligrams/deciliter      ALLERGIES: No Known Allergies      FAMILY HISTORY:  No pertinent family history in first degree relatives      Social history:  Alochol:   Smoking:   Drug Use:   Marital Status:     PHYSICAL EXAMINATION:  -----------------------------  T(C): 36.8 (05-05-18 @ 08:00), Max: 36.8 (05-05-18 @ 05:30)  HR: 64 (05-05-18 @ 08:00) (64 - 72)  BP: 152/62 (05-05-18 @ 08:00) (129/81 - 197/78)  RR: 17 (05-05-18 @ 08:00) (15 - 18)  SpO2: 97% (05-05-18 @ 08:00) (91% - 99%)  Wt(kg): --    05-04 @ 07:01  -  05-05 @ 07:00  --------------------------------------------------------  IN:    Oral Fluid: 200 mL  Total IN: 200 mL    OUT:    Voided: 400 mL  Total OUT: 400 mL    Total NET: -200 mL            Constitutional: well developed, normal appearance, well groomed, well nourished, no deformities and no acute distress.   Eyes: the conjunctiva exhibited no abnormalities and the eyelids demonstrated no xanthelasmas.   HEENT: normal oral mucosa, no oral pallor and no oral cyanosis.   Neck: normal jugular venous A waves present, normal jugular venous V waves present and no jugular venous eavns A waves.   Pulmonary: no respiratory distress, normal respiratory rhythm and effort, no accessory muscle use and lungs were clear to auscultation bilaterally. Anteriorly  Cardiovascular: heart rate and rhythm were normal, normal S1 and S2 and no murmur, gallop, rub, heave or thrill are present.    Musculoskeletal: the gait could not be assessed.  Extremities: no clubbing of the fingernails, no localized cyanosis, no petechial hemorrhages and no ischemic changes.   Skin: normal skin color and pigmentation, no rash, no venous stasis, no skin lesions, no skin ulcer and no xanthoma was observed.   Psychiatric: oriented to person, place, and time, the affect was normal, the mood was normal and not feeling anxious.     LABS:   --------  05-05    142  |  109<H>  |  72<H>  ----------------------------<  109<H>  4.7   |  26  |  5.20<H>    Ca    8.4<L>      05 May 2018 06:23  Phos  5.5     05-05    TPro  6.9  /  Alb  2.6<L>  /  TBili  0.2  /  DBili  x   /  AST  8<L>  /  ALT  9<L>  /  AlkPhos  61  05-05                         7.0    10.0  )-----------( 218      ( 05 May 2018 06:23 )             21.9     PT/INR - ( 03 May 2018 17:53 )   PT: 13.3 sec;   INR: 1.22 ratio         PTT - ( 03 May 2018 17:53 )  PTT:27.4 sec    05-04 @ 06:38 CPK total:--, CKMB --, Troponin I - .129 ng/mL<H>  05-04 @ 01:25 CPK total:--, CKMB --, Troponin I - .159 ng/mL<H>  05-03 @ 17:53 CPK total:--, CKMB --, Troponin I - .020 ng/mL      Culture Results:   No growth to date. (05-03 @ 22:14)  Culture Results:   No growth to date. (05-03 @ 22:14)    05-03 @ 22:14    Organism --   Gram Stain Blood -- Gram Stain --  Specimen Source .Blood Blood  Culture-Blood --        Radiology:

## 2018-05-05 NOTE — PROGRESS NOTE ADULT - SUBJECTIVE AND OBJECTIVE BOX
Patient is a 68y old  Female who presents with a chief complaint of SOB, mild chest pain (04 May 2018 12:48)      INTERVAL HPI/OVERNIGHT EVENTS: 68F PMHx CAD (3 stents 2007), DM type 2 on insulin, Peripheral neuropathy, HTN transferred from [place] ED for hemodialysis due to electrolyte abnormalities, shortness of breath sec to acute on chr diastolic chf , chest pain possible atypical  . pt feels better, no sob or chest pain.     MEDICATIONS  (STANDING):  aspirin enteric coated 81 milliGRAM(s) Oral daily  atorvastatin 20 milliGRAM(s) Oral at bedtime  clopidogrel Tablet 75 milliGRAM(s) Oral daily  dextrose 5%. 1000 milliLiter(s) (50 mL/Hr) IV Continuous <Continuous>  dextrose 50% Injectable 12.5 Gram(s) IV Push once  dextrose 50% Injectable 25 Gram(s) IV Push once  dextrose 50% Injectable 25 Gram(s) IV Push once  epoetin analilia Injectable 39439 Unit(s) SubCutaneous <User Schedule>  furosemide   Injectable 40 milliGRAM(s) IV Push two times a day  gabapentin 300 milliGRAM(s) Oral two times a day  insulin glargine Injectable (LANTUS) 8 Unit(s) SubCutaneous at bedtime  insulin glargine Injectable (LANTUS) 26 Unit(s) SubCutaneous every morning  insulin lispro (HumaLOG) corrective regimen sliding scale   SubCutaneous three times a day before meals  isosorbide   dinitrate Tablet (ISORDIL) 30 milliGRAM(s) Oral three times a day  metoprolol tartrate 50 milliGRAM(s) Oral two times a day  NIFEdipine XL 60 milliGRAM(s) Oral daily    MEDICATIONS  (PRN):  dextrose Gel 1 Dose(s) Oral once PRN Blood Glucose LESS THAN 70 milliGRAM(s)/deciliter  glucagon  Injectable 1 milliGRAM(s) IntraMuscular once PRN Glucose LESS THAN 70 milligrams/deciliter      Allergies    No Known Allergies    Intolerances        REVIEW OF SYSTEMS:  CONSTITUTIONAL: No fever, weight loss, or fatigue  EYES: No eye pain, visual disturbances, or discharge  ENMT:  No difficulty hearing, tinnitus, vertigo; No sinus or throat pain  NECK: No pain or stiffness  BREASTS: No pain, masses, or nipple discharge  RESPIRATORY: No cough, wheezing, chills or hemoptysis; No shortness of breath  CARDIOVASCULAR: No chest pain, palpitations, dizziness, or leg swelling  GASTROINTESTINAL: No abdominal or epigastric pain. No nausea, vomiting, or hematemesis; No diarrhea or constipation. No melena or hematochezia.  GENITOURINARY: No dysuria, frequency, hematuria, or incontinence  NEUROLOGICAL: No headaches, memory loss, loss of strength, numbness, or tremors  SKIN: No itching, burning, rashes, or lesions   LYMPH NODES: No enlarged glands  ENDOCRINE: No heat or cold intolerance; No hair loss; No polydipsia or polyuria  MUSCULOSKELETAL: No joint pain or swelling; No muscle, back, or extremity pain  PSYCHIATRIC: No depression, anxiety, mood swings, or difficulty sleeping  HEME/LYMPH: No easy bruising, or bleeding gums  ALLERGY AND IMMUNOLOGIC: No hives or eczema    Vital Signs Last 24 Hrs  T(C): 36.7 (05 May 2018 16:51), Max: 36.8 (05 May 2018 05:30)  T(F): 98.1 (05 May 2018 16:51), Max: 98.2 (05 May 2018 05:30)  HR: 69 (05 May 2018 16:51) (64 - 72)  BP: 129/71 (05 May 2018 16:51) (129/71 - 154/67)  BP(mean): --  RR: 18 (05 May 2018 16:51) (16 - 18)  SpO2: 97% (05 May 2018 16:51) (96% - 98%)    PHYSICAL EXAM:  GENERAL: NAD, well-groomed, well-developed  HEAD:  Atraumatic, Normocephalic  EYES: EOMI, PERRLA, conjunctiva and sclera clear  ENMT: No tonsillar erythema, exudates, or enlargement; Moist mucous membranes, Good dentition, No lesions  NECK: Supple, No JVD, Normal thyroid  NERVOUS SYSTEM:  Alert & Oriented X3, Good concentration; Motor Strength 5/5 B/L upper and lower extremities; DTRs 2+ intact and symmetric  CHEST/LUNG: Clear to auscultation bilaterally; No rales, rhonchi, wheezing, or rubs  HEART: Regular rate and rhythm; No murmurs, rubs, or gallops  ABDOMEN: Soft, Nontender, Nondistended; Bowel sounds present  EXTREMITIES:  2+ Peripheral Pulses, No clubbing, cyanosis, or edema  LYMPH: No lymphadenopathy noted  SKIN: No rashes or lesions    LABS:                        7.1    x     )-----------( x        ( 05 May 2018 10:13 )             22.2     05 May 2018 06:23    142    |  109    |  72     ----------------------------<  109    4.7     |  26     |  5.20     Ca    8.4        05 May 2018 06:23  Phos  5.5       05 May 2018 06:23    TPro  6.4    /  Alb  x      /  TBili  x      /  DBili  x      /  AST  x      /  ALT  x      /  AlkPhos  x      05 May 2018 10:26    PT/INR - ( 03 May 2018 17:53 )   PT: 13.3 sec;   INR: 1.22 ratio         PTT - ( 03 May 2018 17:53 )  PTT:27.4 sec  Urinalysis Basic - ( 03 May 2018 19:23 )    Color: Yellow / Appearance: Clear / S.010 / pH: x  Gluc: x / Ketone: Negative  / Bili: Negative / Urobili: Negative mg/dL   Blood: x / Protein: 100 mg/dL / Nitrite: Negative   Leuk Esterase: Negative / RBC: 3-5 /HPF / WBC 0-2   Sq Epi: x / Non Sq Epi: Few / Bacteria: Few      CAPILLARY BLOOD GLUCOSE      POCT Blood Glucose.: 113 mg/dL (05 May 2018 16:42)  POCT Blood Glucose.: 184 mg/dL (05 May 2018 11:24)  POCT Blood Glucose.: 101 mg/dL (05 May 2018 07:25)  POCT Blood Glucose.: 216 mg/dL (04 May 2018 21:09)      RADIOLOGY & ADDITIONAL TESTS:    Imaging Personally Reviewed: < from: Xray Chest 1 View- PORTABLE-Urgent (18 @ 17:49) >  Increased pulmonary vascular congestion noted. No gross   consolidation is seen.   [x ] YES  [ ] NO    Consultant(s) Notes Reviewed:  [ x] YES  [ ] NO    Care Discussed with Consultants/Other Providers x ] YES  [ ] NO

## 2018-05-05 NOTE — PROGRESS NOTE ADULT - PROBLEM SELECTOR PLAN 7
hx MI 2007 s/p stents   - No acute EKG change  - Continue clopidogrel ,asa , statin   - Cardio (Dr. Mosher) consulted.

## 2018-05-05 NOTE — PROGRESS NOTE ADULT - PROBLEM SELECTOR PLAN 2
Likely anemia of chronic disease given CKD.   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

## 2018-05-05 NOTE — PROGRESS NOTE ADULT - PROBLEM SELECTOR PLAN 6
type2 , fu hba1c   - low dose ISS with accuchecks, hypoglycemic protocol  - Home lantus dose (30 AM, 10 PM) adjusted to (26 AM, 8 qhs)  - Diet: consistent carb

## 2018-05-05 NOTE — PROGRESS NOTE ADULT - ASSESSMENT
· Assessment		  The patient is a 68 year old female with a history of HTN, DM, CAD s/p PCI in 2007, ESRD not on HD who presents with chest pain and shortness of breath.    5/5/18  Patient sitting up.  No cardiac complaints offered.    Plan:  - Chest pain likely musculoskeletal in etiology given reproducibility of pain. Currently pain free.  - Troponin mildly elevated at 0.159 and trended down. Likely due to acute diastolic heart failure and retention of enzymes from ESRD.  -  Awaiting Echocardiogram report.  - Continue aspirin, clopidogrel for CAD  - Continue losartan, nifedipine, metoprolol, isordil for HTN  - Continue furosemide 40 mg IV q12h  - Being followed by Renal.

## 2018-05-06 LAB
ALBUMIN SERPL ELPH-MCNC: 2.7 G/DL — LOW (ref 3.3–5)
ANION GAP SERPL CALC-SCNC: 9 MMOL/L — SIGNIFICANT CHANGE UP (ref 5–17)
BUN SERPL-MCNC: 77 MG/DL — HIGH (ref 7–23)
CALCIUM SERPL-MCNC: 8.5 MG/DL — SIGNIFICANT CHANGE UP (ref 8.5–10.1)
CHLORIDE SERPL-SCNC: 108 MMOL/L — SIGNIFICANT CHANGE UP (ref 96–108)
CO2 SERPL-SCNC: 24 MMOL/L — SIGNIFICANT CHANGE UP (ref 22–31)
CREAT SERPL-MCNC: 5.7 MG/DL — HIGH (ref 0.5–1.3)
GLUCOSE SERPL-MCNC: 86 MG/DL — SIGNIFICANT CHANGE UP (ref 70–99)
HCT VFR BLD CALC: 22.2 % — LOW (ref 34.5–45)
HGB BLD-MCNC: 7.2 G/DL — LOW (ref 11.5–15.5)
MCHC RBC-ENTMCNC: 27.2 PG — SIGNIFICANT CHANGE UP (ref 27–34)
MCHC RBC-ENTMCNC: 32.4 GM/DL — SIGNIFICANT CHANGE UP (ref 32–36)
MCV RBC AUTO: 84.2 FL — SIGNIFICANT CHANGE UP (ref 80–100)
PHOSPHATE SERPL-MCNC: 6.3 MG/DL — HIGH (ref 2.5–4.5)
PLATELET # BLD AUTO: 200 K/UL — SIGNIFICANT CHANGE UP (ref 150–400)
POTASSIUM SERPL-MCNC: 4.3 MMOL/L — SIGNIFICANT CHANGE UP (ref 3.5–5.3)
POTASSIUM SERPL-SCNC: 4.3 MMOL/L — SIGNIFICANT CHANGE UP (ref 3.5–5.3)
RBC # BLD: 2.64 M/UL — LOW (ref 3.8–5.2)
RBC # FLD: 14.2 % — SIGNIFICANT CHANGE UP (ref 10.3–14.5)
SODIUM SERPL-SCNC: 141 MMOL/L — SIGNIFICANT CHANGE UP (ref 135–145)
WBC # BLD: 9.8 K/UL — SIGNIFICANT CHANGE UP (ref 3.8–10.5)
WBC # FLD AUTO: 9.8 K/UL — SIGNIFICANT CHANGE UP (ref 3.8–10.5)

## 2018-05-06 PROCEDURE — 99233 SBSQ HOSP IP/OBS HIGH 50: CPT

## 2018-05-06 RX ORDER — FUROSEMIDE 40 MG
40 TABLET ORAL
Qty: 0 | Refills: 0 | Status: DISCONTINUED | OUTPATIENT
Start: 2018-05-06 | End: 2018-05-09

## 2018-05-06 RX ORDER — IRON SUCROSE 20 MG/ML
100 INJECTION, SOLUTION INTRAVENOUS DAILY
Qty: 0 | Refills: 0 | Status: COMPLETED | OUTPATIENT
Start: 2018-05-07 | End: 2018-05-09

## 2018-05-06 RX ADMIN — ISOSORBIDE DINITRATE 30 MILLIGRAM(S): 5 TABLET ORAL at 14:31

## 2018-05-06 RX ADMIN — Medication 60 MILLIGRAM(S): at 05:43

## 2018-05-06 RX ADMIN — Medication 40 MILLIGRAM(S): at 05:42

## 2018-05-06 RX ADMIN — ISOSORBIDE DINITRATE 30 MILLIGRAM(S): 5 TABLET ORAL at 21:39

## 2018-05-06 RX ADMIN — INSULIN GLARGINE 26 UNIT(S): 100 INJECTION, SOLUTION SUBCUTANEOUS at 08:17

## 2018-05-06 RX ADMIN — ISOSORBIDE DINITRATE 30 MILLIGRAM(S): 5 TABLET ORAL at 05:43

## 2018-05-06 RX ADMIN — Medication 81 MILLIGRAM(S): at 12:24

## 2018-05-06 RX ADMIN — CLOPIDOGREL BISULFATE 75 MILLIGRAM(S): 75 TABLET, FILM COATED ORAL at 12:24

## 2018-05-06 RX ADMIN — ATORVASTATIN CALCIUM 20 MILLIGRAM(S): 80 TABLET, FILM COATED ORAL at 21:39

## 2018-05-06 RX ADMIN — Medication 50 MILLIGRAM(S): at 17:35

## 2018-05-06 RX ADMIN — INSULIN GLARGINE 8 UNIT(S): 100 INJECTION, SOLUTION SUBCUTANEOUS at 21:39

## 2018-05-06 RX ADMIN — Medication 50 MILLIGRAM(S): at 05:43

## 2018-05-06 RX ADMIN — GABAPENTIN 300 MILLIGRAM(S): 400 CAPSULE ORAL at 17:37

## 2018-05-06 RX ADMIN — Medication 40 MILLIGRAM(S): at 17:35

## 2018-05-06 RX ADMIN — Medication 2: at 12:23

## 2018-05-06 RX ADMIN — GABAPENTIN 300 MILLIGRAM(S): 400 CAPSULE ORAL at 05:43

## 2018-05-06 NOTE — PROGRESS NOTE ADULT - SUBJECTIVE AND OBJECTIVE BOX
Chief Complaint: Chest pain, shortness of breath    Interval Events: Overall feeling better.    Review of Systems:  General: No fevers, chills, weight loss or gain  Skin: No rashes, color changes  Cardiovascular: No chest pain, orthopnea  Respiratory: No shortness of breath, cough  Gastrointestinal: No nausea, abdominal pain  Genitourinary: No incontinence, pain with urination  Musculoskeletal: No pain, swelling, decreased range of motion  Neurological: No headache, weakness  Psychiatric: No depression, anxiety  Endocrine: No weight loss or gain, increased thirst  All other systems are comprehensively negative.    Physical Exam:  Vital Signs Last 24 Hrs  T(C): 36.6 (06 May 2018 08:16), Max: 37 (05 May 2018 23:42)  T(F): 97.9 (06 May 2018 08:16), Max: 98.6 (05 May 2018 23:42)  HR: 68 (06 May 2018 08:16) (68 - 74)  BP: 152/72 (06 May 2018 08:16) (129/71 - 161/74)  BP(mean): --  RR: 17 (06 May 2018 08:16) (16 - 18)  SpO2: 96% (06 May 2018 08:16) (95% - 98%)  General: NAD  HEENT: MMM  Neck: No JVD, no carotid bruit  Lungs: rales at bases  CV: RRR, nl S1/S2, no M/R/G  Abdomen: S/NT/ND, +BS  Extremities: Trace LE edema, no cyanosis  Neuro: AAOx3, non-focal  Skin: No rash    Labs:             05-06    141  |  108  |  77<H>  ----------------------------<  86  4.3   |  24  |  5.70<H>    Ca    8.5      06 May 2018 05:49  Phos  6.3     05-06    TPro  x   /  Alb  2.7<L>  /  TBili  x   /  DBili  x   /  AST  x   /  ALT  x   /  AlkPhos  x   05-06                        7.2    9.8   )-----------( 200      ( 06 May 2018 05:49 )             22.2         Telemetry: Sinus rhythm

## 2018-05-06 NOTE — PROGRESS NOTE ADULT - ASSESSMENT
Advanced CKD, anemia of CKD. R/o G.I. bleed. Resolving volume overload.  Slow azotemic trend with diuresis.  To change to Lasix 40 mg P.O. B.I.D.    Continue with OLIVA. Add I.V. Venofer.  F/u on stool for occult blood.  May need pRBC if HGB drops further.  Will need pRBC before AVF surgery.  2 DECHO of poor quality, no severe valvular disease, low-normal LVEF.  Will need cardiology clearance for AVF surgery on this admission. Question holding Plavix for AVF surgery?  Needs vascular surgery evaluation for AVF creation.

## 2018-05-06 NOTE — PROGRESS NOTE ADULT - SUBJECTIVE AND OBJECTIVE BOX
Patient is a 68y old  Female who presents with a chief complaint of SOB, mild chest pain (04 May 2018 12:48)      INTERVAL HPI/OVERNIGHT EVENTS: 68F PMHx CAD (3 stents 2007), DM type 2 on insulin, Peripheral neuropathy, HTN transferred from [place] ED for hemodialysis due to electrolyte abnormalities, shortness of breath sec to acute on chr diastolic chf , chest pain possible atypical  . pt feels better, no sob.     MEDICATIONS  (STANDING):  aspirin enteric coated 81 milliGRAM(s) Oral daily  atorvastatin 20 milliGRAM(s) Oral at bedtime  clopidogrel Tablet 75 milliGRAM(s) Oral daily  dextrose 5%. 1000 milliLiter(s) (50 mL/Hr) IV Continuous <Continuous>  dextrose 50% Injectable 12.5 Gram(s) IV Push once  dextrose 50% Injectable 25 Gram(s) IV Push once  dextrose 50% Injectable 25 Gram(s) IV Push once  epoetin analilia Injectable 61144 Unit(s) SubCutaneous <User Schedule>  furosemide   Injectable 40 milliGRAM(s) IV Push two times a day  gabapentin 300 milliGRAM(s) Oral two times a day  insulin glargine Injectable (LANTUS) 8 Unit(s) SubCutaneous at bedtime  insulin glargine Injectable (LANTUS) 26 Unit(s) SubCutaneous every morning  insulin lispro (HumaLOG) corrective regimen sliding scale   SubCutaneous three times a day before meals  isosorbide   dinitrate Tablet (ISORDIL) 30 milliGRAM(s) Oral three times a day  metoprolol tartrate 50 milliGRAM(s) Oral two times a day  NIFEdipine XL 60 milliGRAM(s) Oral daily    MEDICATIONS  (PRN):  dextrose Gel 1 Dose(s) Oral once PRN Blood Glucose LESS THAN 70 milliGRAM(s)/deciliter  glucagon  Injectable 1 milliGRAM(s) IntraMuscular once PRN Glucose LESS THAN 70 milligrams/deciliter      Allergies    No Known Allergies    Intolerances        REVIEW OF SYSTEMS:  CONSTITUTIONAL: No fever, weight loss, or fatigue  EYES: No eye pain, visual disturbances, or discharge  ENMT:  No difficulty hearing, tinnitus, vertigo; No sinus or throat pain  NECK: No pain or stiffness  BREASTS: No pain, masses, or nipple discharge  RESPIRATORY: No cough, wheezing, chills or hemoptysis; No shortness of breath  CARDIOVASCULAR: No chest pain, palpitations, dizziness, or leg swelling  GASTROINTESTINAL: No abdominal or epigastric pain. No nausea, vomiting, or hematemesis; No diarrhea or constipation. No melena or hematochezia.  GENITOURINARY: No dysuria, frequency, hematuria, or incontinence  NEUROLOGICAL: No headaches, memory loss, loss of strength, numbness, or tremors  SKIN: No itching, burning, rashes, or lesions   LYMPH NODES: No enlarged glands  ENDOCRINE: No heat or cold intolerance; No hair loss; No polydipsia or polyuria  MUSCULOSKELETAL: No joint pain or swelling; No muscle, back, or extremity pain  PSYCHIATRIC: No depression, anxiety, mood swings, or difficulty sleeping  HEME/LYMPH: No easy bruising, or bleeding gums  ALLERGY AND IMMUNOLOGIC: No hives or eczema    Vital Signs Last 24 Hrs  T(C): 36.7 (06 May 2018 12:21), Max: 37 (05 May 2018 23:42)  T(F): 98 (06 May 2018 12:21), Max: 98.6 (05 May 2018 23:42)  HR: 70 (06 May 2018 12:21) (68 - 74)  BP: 164/73 (06 May 2018 12:21) (129/71 - 164/73)  BP(mean): --  RR: 16 (06 May 2018 12:21) (16 - 18)  SpO2: 97% (06 May 2018 12:21) (95% - 97%)    PHYSICAL EXAM:  GENERAL: NAD, well-groomed, well-developed  HEAD:  Atraumatic, Normocephalic  EYES: EOMI, PERRLA, conjunctiva and sclera clear  ENMT: No tonsillar erythema, exudates, or enlargement; Moist mucous membranes, Good dentition, No lesions  NECK: Supple, No JVD, Normal thyroid  NERVOUS SYSTEM:  Alert & Oriented X3, Good concentration; Motor Strength 5/5 B/L upper and lower extremities; DTRs 2+ intact and symmetric  CHEST/LUNG: Clear to auscultation bilaterally; No rales, rhonchi, wheezing, or rubs  HEART: Regular rate and rhythm; No murmurs, rubs, or gallops  ABDOMEN: Soft, Nontender, Nondistended; Bowel sounds present  EXTREMITIES:  2+ Peripheral Pulses, No clubbing, cyanosis, or edema  LYMPH: No lymphadenopathy noted  SKIN: No rashes or lesions    LABS:                        7.2    9.8   )-----------( 200      ( 06 May 2018 05:49 )             22.2     06 May 2018 05:49    141    |  108    |  77     ----------------------------<  86     4.3     |  24     |  5.70     Ca    8.5        06 May 2018 05:49  Phos  6.3       06 May 2018 05:49    TPro  x      /  Alb  2.7    /  TBili  x      /  DBili  x      /  AST  x      /  ALT  x      /  AlkPhos  x      06 May 2018 05:49        CAPILLARY BLOOD GLUCOSE      POCT Blood Glucose.: 234 mg/dL (06 May 2018 11:26)  POCT Blood Glucose.: 139 mg/dL (06 May 2018 07:17)  POCT Blood Glucose.: 146 mg/dL (05 May 2018 21:44)  POCT Blood Glucose.: 113 mg/dL (05 May 2018 16:42)      RADIOLOGY & ADDITIONAL TESTS:    Imaging Personally Reviewed: < from: Xray Chest 1 View- PORTABLE-Urgent (05.03.18 @ 17:49) >   Increased pulmonary vascular congestion noted. No gross   consolidation is seen.             < end of copied text >   [x ] YES  [ ] NO    Consultant(s) Notes Reviewed:  [ x] YES  [ ] NO    Care Discussed with Consultants/Other Providers [x ] YES  [ ] NO

## 2018-05-06 NOTE — PROGRESS NOTE ADULT - ASSESSMENT
The patient is a 68 year old female with a history of HTN, DM, CAD s/p PCI in 2007, ESRD not on HD who presents with chest pain and shortness of breath.    Plan:  - Chest pain likely musculoskeletal in etiology given reproducibility of pain. Currently pain free.  - Troponin mildly elevated at 0.159 and trended down. Likely due to acute diastolic heart failure and retention of enzymes from ESRD. No need to trend further.  - Echocardiogram with low normal LV systolic function, no significant valve issues.  - Continue aspirin, clopidogrel for CAD  - Continue losartan, nifedipine, metoprolol, isordil for HTN  - Continue furosemide 40 mg IV q12h. Likely transition to PO in 1-2 days.  - Renal follow-up  - Vascular eval

## 2018-05-06 NOTE — PROGRESS NOTE ADULT - PROBLEM SELECTOR PLAN 4
like atypical as per   Dr. Mena (Cardio) . high troponin increase demand ischemia sec to chf . pt have hx cad 2 stent 2007 on asa/ Plavix statin, trending down.

## 2018-05-06 NOTE — PROGRESS NOTE ADULT - ASSESSMENT
68F PMHx CAD (3 stents 2007), DM type 2 on insulin, Peripheral neuropathy, HTN transferred from [place] ED for hemodialysis due to electrolyte abnormalities, shortness of breath sec to acute on chr diastolic chf , chest pain possible atypical  .

## 2018-05-06 NOTE — PROGRESS NOTE ADULT - SUBJECTIVE AND OBJECTIVE BOX
Patient seen in follow up for CKD, fluid overload. No longer SOB supine without oxygen, no chest pain, good appetite, no nausea.   She was previously followed by nephrologist in [place], was refusing AVF.    CAD (coronary artery disease)  Diabetes  CKD  Hypertension  Pneumonia  Myocardial infarction  Hypertension  Diabetess    MEDICATIONS  (STANDING):  aspirin enteric coated 81 milliGRAM(s) Oral daily  atorvastatin 20 milliGRAM(s) Oral at bedtime  clopidogrel Tablet 75 milliGRAM(s) Oral daily  dextrose 5%. 1000 milliLiter(s) (50 mL/Hr) IV Continuous <Continuous>  dextrose 50% Injectable 12.5 Gram(s) IV Push once  dextrose 50% Injectable 25 Gram(s) IV Push once  dextrose 50% Injectable 25 Gram(s) IV Push once  epoetin analilia Injectable 13383 Unit(s) SubCutaneous <User Schedule>  furosemide   Injectable 40 milliGRAM(s) IV Push two times a day  gabapentin 300 milliGRAM(s) Oral two times a day  insulin glargine Injectable (LANTUS) 8 Unit(s) SubCutaneous at bedtime  insulin glargine Injectable (LANTUS) 26 Unit(s) SubCutaneous every morning  insulin lispro (HumaLOG) corrective regimen sliding scale   SubCutaneous three times a day before meals  isosorbide   dinitrate Tablet (ISORDIL) 30 milliGRAM(s) Oral three times a day  metoprolol tartrate 50 milliGRAM(s) Oral two times a day  NIFEdipine XL 60 milliGRAM(s) Oral daily    MEDICATIONS  (PRN):  dextrose Gel 1 Dose(s) Oral once PRN Blood Glucose LESS THAN 70 milliGRAM(s)/deciliter  glucagon  Injectable 1 milliGRAM(s) IntraMuscular once PRN Glucose LESS THAN 70 milligrams/deciliter    T(C): 36.7 (05-06-18 @ 16:11), Max: 37 (05-05-18 @ 23:42)  HR: 67 (05-06-18 @ 16:11) (64 - 74)  BP: 137/66 (05-06-18 @ 16:11) (129/71 - 164/73)  RR: 20 (05-06-18 @ 16:11) (16 - 20)  SpO2: 95% (05-06-18 @ 16:11) (95% - 98%)  Wt(kg): --  I&O's Detail      PHYSICAL EXAM:  General: NAD, supine, conversant  Respiratory: b/l air entry, clear  Cardiovascular: S1 S2 reg, no rub  Gastrointestinal: soft, not tender, BS present  Extremities: no edema    CBC Full  -  ( 06 May 2018 05:49 )  WBC Count : 9.8 K/uL  Hemoglobin : 7.2 g/dL  Hematocrit : 22.2 %  Platelet Count - Automated : 200 K/uL  Mean Cell Volume : 84.2 fl  Mean Cell Hemoglobin : 27.2 pg  Mean Cell Hemoglobin Concentration : 32.4 gm/dL  Auto Neutrophil # : x  Auto Lymphocyte # : x  Auto Monocyte # : x  Auto Eosinophil # : x  Auto Basophil # : x  Auto Neutrophil % : x  Auto Lymphocyte % : x  Auto Monocyte % : x  Auto Eosinophil % : x  Auto Basophil % : x    05-06    141  |  108  |  77<H>  ----------------------------<  86  4.3   |  24  |  5.70<H>    Ca    8.5      06 May 2018 05:49  Phos  6.3     05-06    TPro  x   /  Alb  2.7<L>  /  TBili  x   /  DBili  x   /  AST  x   /  ALT  x   /  AlkPhos  x   05-06        Sodium, Serum: 141 (05-06 @ 05:49)  Sodium, Serum: 142 (05-05 @ 06:23)  Sodium, Serum: 147 (05-04 @ 06:38)  Sodium, Serum: 145 (05-04 @ 02:18)    Creatinine, Serum: 5.70 (05-06 @ 05:49)  Creatinine, Serum: 5.20 (05-05 @ 06:23)  Creatinine, Serum: 5.00 (05-04 @ 06:38)  Creatinine, Serum: 5.00 (05-04 @ 02:18)    Potassium, Serum: 4.3 (05-06 @ 05:49)  Potassium, Serum: 4.7 (05-05 @ 06:23)  Potassium, Serum: 4.9 (05-04 @ 06:38)  Potassium, Serum: 5.1 (05-04 @ 02:18)    Hemoglobin: 7.2 (05-06 @ 05:49)  Hemoglobin: 7.1 (05-05 @ 10:13)  Hemoglobin: 7.0 (05-05 @ 06:23)  Hemoglobin: 7.4 (05-04 @ 06:38)  Hemoglobin: 7.7 (05-04 @ 02:33)

## 2018-05-07 LAB
% ALBUMIN: 47 % — SIGNIFICANT CHANGE UP
% ALPHA 1: 5.4 % — SIGNIFICANT CHANGE UP
% ALPHA 2: 13.8 % — SIGNIFICANT CHANGE UP
% BETA: 11.8 % — SIGNIFICANT CHANGE UP
% GAMMA: 22 % — SIGNIFICANT CHANGE UP
ALBUMIN SERPL ELPH-MCNC: 2.8 G/DL — LOW (ref 3.3–5)
ALBUMIN SERPL ELPH-MCNC: 3 G/DL — LOW (ref 3.6–5.5)
ALBUMIN/GLOB SERPL ELPH: 0.9 RATIO — SIGNIFICANT CHANGE UP
ALPHA1 GLOB SERPL ELPH-MCNC: 0.3 G/DL — SIGNIFICANT CHANGE UP (ref 0.1–0.4)
ALPHA2 GLOB SERPL ELPH-MCNC: 0.9 G/DL — SIGNIFICANT CHANGE UP (ref 0.5–1)
ANION GAP SERPL CALC-SCNC: 8 MMOL/L — SIGNIFICANT CHANGE UP (ref 5–17)
B-GLOBULIN SERPL ELPH-MCNC: 0.8 G/DL — SIGNIFICANT CHANGE UP (ref 0.5–1)
BUN SERPL-MCNC: 82 MG/DL — HIGH (ref 7–23)
CALCIUM SERPL-MCNC: 8.6 MG/DL — SIGNIFICANT CHANGE UP (ref 8.5–10.1)
CHLORIDE SERPL-SCNC: 105 MMOL/L — SIGNIFICANT CHANGE UP (ref 96–108)
CO2 SERPL-SCNC: 26 MMOL/L — SIGNIFICANT CHANGE UP (ref 22–31)
CREAT SERPL-MCNC: 5.7 MG/DL — HIGH (ref 0.5–1.3)
GAMMA GLOBULIN: 1.4 G/DL — SIGNIFICANT CHANGE UP (ref 0.6–1.6)
GLUCOSE SERPL-MCNC: 136 MG/DL — HIGH (ref 70–99)
HCT VFR BLD CALC: 23 % — LOW (ref 34.5–45)
HGB BLD-MCNC: 7.3 G/DL — LOW (ref 11.5–15.5)
MCHC RBC-ENTMCNC: 26.6 PG — LOW (ref 27–34)
MCHC RBC-ENTMCNC: 31.6 GM/DL — LOW (ref 32–36)
MCV RBC AUTO: 84.2 FL — SIGNIFICANT CHANGE UP (ref 80–100)
PHOSPHATE SERPL-MCNC: 5.9 MG/DL — HIGH (ref 2.5–4.5)
PLATELET # BLD AUTO: 221 K/UL — SIGNIFICANT CHANGE UP (ref 150–400)
POTASSIUM SERPL-MCNC: 4.7 MMOL/L — SIGNIFICANT CHANGE UP (ref 3.5–5.3)
POTASSIUM SERPL-SCNC: 4.7 MMOL/L — SIGNIFICANT CHANGE UP (ref 3.5–5.3)
PROT PATTERN SERPL ELPH-IMP: SIGNIFICANT CHANGE UP
RBC # BLD: 2.73 M/UL — LOW (ref 3.8–5.2)
RBC # FLD: 14.4 % — SIGNIFICANT CHANGE UP (ref 10.3–14.5)
SODIUM SERPL-SCNC: 139 MMOL/L — SIGNIFICANT CHANGE UP (ref 135–145)
WBC # BLD: 11.2 K/UL — HIGH (ref 3.8–10.5)
WBC # FLD AUTO: 11.2 K/UL — HIGH (ref 3.8–10.5)

## 2018-05-07 PROCEDURE — 99233 SBSQ HOSP IP/OBS HIGH 50: CPT | Mod: GC

## 2018-05-07 RX ORDER — DOCUSATE SODIUM 100 MG
100 CAPSULE ORAL
Qty: 0 | Refills: 0 | Status: DISCONTINUED | OUTPATIENT
Start: 2018-05-07 | End: 2018-05-14

## 2018-05-07 RX ORDER — SIMETHICONE 80 MG/1
80 TABLET, CHEWABLE ORAL ONCE
Qty: 0 | Refills: 0 | Status: COMPLETED | OUTPATIENT
Start: 2018-05-07 | End: 2018-05-07

## 2018-05-07 RX ORDER — SENNA PLUS 8.6 MG/1
1 TABLET ORAL ONCE
Qty: 0 | Refills: 0 | Status: COMPLETED | OUTPATIENT
Start: 2018-05-07 | End: 2018-05-07

## 2018-05-07 RX ORDER — DOCUSATE SODIUM 100 MG
100 CAPSULE ORAL ONCE
Qty: 0 | Refills: 0 | Status: COMPLETED | OUTPATIENT
Start: 2018-05-07 | End: 2018-05-07

## 2018-05-07 RX ORDER — SIMETHICONE 80 MG/1
80 TABLET, CHEWABLE ORAL DAILY
Qty: 0 | Refills: 0 | Status: DISCONTINUED | OUTPATIENT
Start: 2018-05-07 | End: 2018-05-14

## 2018-05-07 RX ORDER — SENNA PLUS 8.6 MG/1
2 TABLET ORAL AT BEDTIME
Qty: 0 | Refills: 0 | Status: DISCONTINUED | OUTPATIENT
Start: 2018-05-07 | End: 2018-05-14

## 2018-05-07 RX ADMIN — Medication 100 MILLIGRAM(S): at 01:22

## 2018-05-07 RX ADMIN — GABAPENTIN 300 MILLIGRAM(S): 400 CAPSULE ORAL at 18:10

## 2018-05-07 RX ADMIN — Medication 40 MILLIGRAM(S): at 05:53

## 2018-05-07 RX ADMIN — ISOSORBIDE DINITRATE 30 MILLIGRAM(S): 5 TABLET ORAL at 23:06

## 2018-05-07 RX ADMIN — SIMETHICONE 80 MILLIGRAM(S): 80 TABLET, CHEWABLE ORAL at 18:10

## 2018-05-07 RX ADMIN — ERYTHROPOIETIN 10000 UNIT(S): 10000 INJECTION, SOLUTION INTRAVENOUS; SUBCUTANEOUS at 18:36

## 2018-05-07 RX ADMIN — IRON SUCROSE 100 MILLIGRAM(S): 20 INJECTION, SOLUTION INTRAVENOUS at 13:39

## 2018-05-07 RX ADMIN — Medication 40 MILLIGRAM(S): at 18:10

## 2018-05-07 RX ADMIN — ISOSORBIDE DINITRATE 30 MILLIGRAM(S): 5 TABLET ORAL at 13:39

## 2018-05-07 RX ADMIN — ATORVASTATIN CALCIUM 20 MILLIGRAM(S): 80 TABLET, FILM COATED ORAL at 23:06

## 2018-05-07 RX ADMIN — SIMETHICONE 80 MILLIGRAM(S): 80 TABLET, CHEWABLE ORAL at 01:21

## 2018-05-07 RX ADMIN — Medication 100 MILLIGRAM(S): at 18:10

## 2018-05-07 RX ADMIN — INSULIN GLARGINE 26 UNIT(S): 100 INJECTION, SOLUTION SUBCUTANEOUS at 07:35

## 2018-05-07 RX ADMIN — Medication 60 MILLIGRAM(S): at 05:53

## 2018-05-07 RX ADMIN — SENNA PLUS 1 TABLET(S): 8.6 TABLET ORAL at 01:21

## 2018-05-07 RX ADMIN — SENNA PLUS 2 TABLET(S): 8.6 TABLET ORAL at 23:05

## 2018-05-07 RX ADMIN — Medication 50 MILLIGRAM(S): at 05:53

## 2018-05-07 RX ADMIN — ISOSORBIDE DINITRATE 30 MILLIGRAM(S): 5 TABLET ORAL at 05:53

## 2018-05-07 RX ADMIN — GABAPENTIN 300 MILLIGRAM(S): 400 CAPSULE ORAL at 05:53

## 2018-05-07 RX ADMIN — Medication 50 MILLIGRAM(S): at 18:10

## 2018-05-07 NOTE — PROGRESS NOTE ADULT - SUBJECTIVE AND OBJECTIVE BOX
Patient is a 68y old  Female who presents with a chief complaint of SOB, mild chest pain (04 May 2018 12:48)      INTERVAL HPI/OVERNIGHT EVENTS: 68F PMHx CAD (3 stents 2007), DM type 2 on insulin, Peripheral neuropathy, HTN transferred from [place] ED for hemodialysis due to electrolyte abnormalities, shortness of breath sec to acute on chr diastolic chf, chest pain possible atypical. No events overnight. Pt complains of mild CP, but as per cardio most likely MSK in nature.        MEDICATIONS  (STANDING):  atorvastatin 20 milliGRAM(s) Oral at bedtime  dextrose 5%. 1000 milliLiter(s) (50 mL/Hr) IV Continuous <Continuous>  dextrose 50% Injectable 12.5 Gram(s) IV Push once  dextrose 50% Injectable 25 Gram(s) IV Push once  dextrose 50% Injectable 25 Gram(s) IV Push once  epoetin analilia Injectable 88538 Unit(s) SubCutaneous <User Schedule>  furosemide    Tablet 40 milliGRAM(s) Oral two times a day  gabapentin 300 milliGRAM(s) Oral two times a day  insulin glargine Injectable (LANTUS) 8 Unit(s) SubCutaneous at bedtime  insulin glargine Injectable (LANTUS) 26 Unit(s) SubCutaneous every morning  insulin lispro (HumaLOG) corrective regimen sliding scale   SubCutaneous three times a day before meals  iron sucrose Injectable 100 milliGRAM(s) IV Push daily  isosorbide   dinitrate Tablet (ISORDIL) 30 milliGRAM(s) Oral three times a day  metoprolol tartrate 50 milliGRAM(s) Oral two times a day  NIFEdipine XL 60 milliGRAM(s) Oral daily    MEDICATIONS  (PRN):  dextrose Gel 1 Dose(s) Oral once PRN Blood Glucose LESS THAN 70 milliGRAM(s)/deciliter  glucagon  Injectable 1 milliGRAM(s) IntraMuscular once PRN Glucose LESS THAN 70 milligrams/deciliter      Allergies    No Known Allergies    Intolerances        REVIEW OF SYSTEMS:  CONSTITUTIONAL: No fever, No chills, No fatigue, No myalgia, No Body ache  EYES: No eye pain, visual disturbances, or discharge  ENMT:  No ear pain, No nose bleed, No vertigo; No sinus or throat pain  NECK: No pain, No stiffness  RESPIRATORY: No cough, wheezing, No  hemoptysis; No shortness of breath  CARDIOVASCULAR: Mild CP, chest pain, palpitations, leg swelling  GASTROINTESTINAL: No abdominal or epigastric pain. No nausea, No vomiting; No diarrhea or constipation.   NEUROLOGICAL: alert and oriented x 3,  No headaches, No dizziness, No numbness,  SKIN:   No itching, burning, rashes, or lesions   MUSCULOSKELETAL: No joint pain or swelling; No muscle pain, No back pain, No extremity pain  REST OF REVIEW Of SYSTEM - [x ] Normal     Height (cm): 165.1 (05-03 @ 20:37), 165.1 (05-03 @ 16:56)  Weight (kg): 95.3 (05-03 @ 20:37), 85.3 (05-03 @ 16:56)  BMI (kg/m2): 35 (05-03 @ 20:37), 31.3 (05-03 @ 16:56)  BSA (m2): 2.02 (05-03 @ 20:37), 1.93 (05-03 @ 16:56)  Vital Signs Last 24 Hrs  T(C): 36.4 (07 May 2018 11:40), Max: 37 (07 May 2018 08:04)  T(F): 97.5 (07 May 2018 11:40), Max: 98.6 (07 May 2018 08:04)  HR: 63 (07 May 2018 11:40) (63 - 71)  BP: 125/73 (07 May 2018 11:40) (119/66 - 137/66)  BP(mean): --  RR: 16 (07 May 2018 11:40) (16 - 22)  SpO2: 97% (07 May 2018 11:40) (95% - 99%)  [x ] room air   [ ] 02      PHYSICAL EXAM:  GENERAL:  No acute distress,  [ ] Agitated, [ ] Lethargy, [ ] confused   HEAD:  normal  ENMT: normal  NECK:  normal    NERVOUS SYSTEM:  Alert & Oriented X3, no focal deficits [ ]Confusion  [ ] Encephalopathic [ ] Sedated [ ] Other  CHEST/LUNG: Clear to auscultation bilaterally,  [ ] decreased breath sounds at bases  [ ] wheezing   [ ] rhonchi  [ ] crackles  HEART:  Regular rate and rhythm, No murmurs, rubs, or gallops,  [ ] irregular   ABDOMEN:  soft, nontender, nondistended, positive bowel sounds   [ ] obese  EXTREMITIES: Mild b/l lower ext edema  SKIN: [ ] venous stasis skin changes      LABS:                        7.3    11.2  )-----------( 221      ( 07 May 2018 05:48 )             23.0     07 May 2018 05:48    139    |  105    |  82     ----------------------------<  136    4.7     |  26     |  5.70     Ca    8.6        07 May 2018 05:48  Phos  5.9       07 May 2018 05:48    TPro  x      /  Alb  2.8    /  TBili  x      /  DBili  x      /  AST  x      /  ALT  x      /  AlkPhos  x      07 May 2018 05:48      Hemoglobin A1C, Whole Blood: 7.9 % (05-04 @ 19:10)  Hemoglobin A1C, Whole Blood: 7.7 % (05-04 @ 07:47)      CAPILLARY BLOOD GLUCOSE      POCT Blood Glucose.: 144 mg/dL (07 May 2018 12:16)  POCT Blood Glucose.: 126 mg/dL (07 May 2018 07:23)  POCT Blood Glucose.: 236 mg/dL (06 May 2018 21:30)  POCT Blood Glucose.: 169 mg/dL (06 May 2018 16:22)    Cultures  Culture Results:   No growth (05-04 @ 12:48)  Culture Results:   No growth to date. (05-03 @ 22:14)  Culture Results:   No growth to date. (05-03 @ 22:14)          RADIOLOGY & ADDITIONAL TESTS:      Care Discussed with [X] Consultants  [ ] Patient  [ ] Family  [X]   /   [ ] Other; RN  DVT prophylaxis [ ] lovenox   [ ] subq heparin  [ ] coumadin  [ ] venodynes [ ] ambulating frequently at how risk for vte and no pharm         or  mechanical prophylaxis required    [ ] other   Advanced directive:    [ ]pt has hcp     [ ] pt declined to assign hcp  Discussed with pt @ bedside Patient is a 68y old  Female who presents with a chief complaint of SOB, mild chest pain (04 May 2018 12:48)      INTERVAL HPI/OVERNIGHT EVENTS: 68F PMHx CAD (3 stents 2007), DM type 2 on insulin, Peripheral neuropathy, HTN transferred from [place] ED for hemodialysis due to electrolyte abnormalities, shortness of breath sec to acute on chr diastolic chf, chest pain possible atypical. No events overnight. Pt complains of mild CP, but as per cardio most likely MSK in nature.        MEDICATIONS  (STANDING):  atorvastatin 20 milliGRAM(s) Oral at bedtime  dextrose 5%. 1000 milliLiter(s) (50 mL/Hr) IV Continuous <Continuous>  dextrose 50% Injectable 12.5 Gram(s) IV Push once  dextrose 50% Injectable 25 Gram(s) IV Push once  dextrose 50% Injectable 25 Gram(s) IV Push once  epoetin analilia Injectable 69138 Unit(s) SubCutaneous <User Schedule>  furosemide    Tablet 40 milliGRAM(s) Oral two times a day  gabapentin 300 milliGRAM(s) Oral two times a day  insulin glargine Injectable (LANTUS) 8 Unit(s) SubCutaneous at bedtime  insulin glargine Injectable (LANTUS) 26 Unit(s) SubCutaneous every morning  insulin lispro (HumaLOG) corrective regimen sliding scale   SubCutaneous three times a day before meals  iron sucrose Injectable 100 milliGRAM(s) IV Push daily  isosorbide   dinitrate Tablet (ISORDIL) 30 milliGRAM(s) Oral three times a day  metoprolol tartrate 50 milliGRAM(s) Oral two times a day  NIFEdipine XL 60 milliGRAM(s) Oral daily    MEDICATIONS  (PRN):  dextrose Gel 1 Dose(s) Oral once PRN Blood Glucose LESS THAN 70 milliGRAM(s)/deciliter  glucagon  Injectable 1 milliGRAM(s) IntraMuscular once PRN Glucose LESS THAN 70 milligrams/deciliter      Allergies    No Known Allergies    Intolerances        REVIEW OF SYSTEMS:  CONSTITUTIONAL: No fever, No chills, No fatigue, No myalgia, No Body ache  EYES: No eye pain, visual disturbances, or discharge  ENMT:  No ear pain, No nose bleed, No vertigo; No sinus or throat pain  NECK: No pain, No stiffness  RESPIRATORY: No cough, wheezing, No  hemoptysis; No shortness of breath  CARDIOVASCULAR: Mild CP, chest pain, palpitations, leg swelling  GASTROINTESTINAL: No abdominal or epigastric pain. No nausea, No vomiting; No diarrhea or constipation.   NEUROLOGICAL: alert and oriented x 3,  No headaches, No dizziness, No numbness,  SKIN:   No itching, burning, rashes, or lesions   MUSCULOSKELETAL: No joint pain or swelling; No muscle pain, No back pain, No extremity pain  REST OF REVIEW Of SYSTEM - [x ] Normal     Height (cm): 165.1 (05-03 @ 20:37), 165.1 (05-03 @ 16:56)  Weight (kg): 95.3 (05-03 @ 20:37), 85.3 (05-03 @ 16:56)  BMI (kg/m2): 35 (05-03 @ 20:37), 31.3 (05-03 @ 16:56)  BSA (m2): 2.02 (05-03 @ 20:37), 1.93 (05-03 @ 16:56)  Vital Signs Last 24 Hrs  T(C): 36.4 (07 May 2018 11:40), Max: 37 (07 May 2018 08:04)  T(F): 97.5 (07 May 2018 11:40), Max: 98.6 (07 May 2018 08:04)  HR: 63 (07 May 2018 11:40) (63 - 71)  BP: 125/73 (07 May 2018 11:40) (119/66 - 137/66)  BP(mean): --  RR: 16 (07 May 2018 11:40) (16 - 22)  SpO2: 97% (07 May 2018 11:40) (95% - 99%)  [x ] room air   [ ] 02      PHYSICAL EXAM:  GENERAL:  No acute distress,  [ ] Agitated, [ ] Lethargy, [ ] confused   HEAD:  normal  ENMT: normal  NECK:  normal    NERVOUS SYSTEM:  Alert & Oriented X3, no focal deficits [ ]Confusion  [ ] Encephalopathic [ ] Sedated [ ] Other  CHEST/LUNG: Clear to auscultation bilaterally,  [ ] decreased breath sounds at bases  [ ] wheezing   [ ] rhonchi  [ ] crackles  HEART:  Regular rate and rhythm, No murmurs, rubs, or gallops,  [ ] irregular   ABDOMEN:  soft, nontender, nondistended, positive bowel sounds   [ ] obese  EXTREMITIES: Mild b/l lower ext edema  SKIN: [ ] venous stasis skin changes      LABS:                        7.3    11.2  )-----------( 221      ( 07 May 2018 05:48 )             23.0     07 May 2018 05:48    139    |  105    |  82     ----------------------------<  136    4.7     |  26     |  5.70     Ca    8.6        07 May 2018 05:48  Phos  5.9       07 May 2018 05:48    TPro  x      /  Alb  2.8    /  TBili  x      /  DBili  x      /  AST  x      /  ALT  x      /  AlkPhos  x      07 May 2018 05:48      Hemoglobin A1C, Whole Blood: 7.9 % (05-04 @ 19:10)  Hemoglobin A1C, Whole Blood: 7.7 % (05-04 @ 07:47)      CAPILLARY BLOOD GLUCOSE      POCT Blood Glucose.: 144 mg/dL (07 May 2018 12:16)  POCT Blood Glucose.: 126 mg/dL (07 May 2018 07:23)  POCT Blood Glucose.: 236 mg/dL (06 May 2018 21:30)  POCT Blood Glucose.: 169 mg/dL (06 May 2018 16:22)    Cultures  Culture Results:   No growth (05-04 @ 12:48)  Culture Results:   No growth to date. (05-03 @ 22:14)  Culture Results:   No growth to date. (05-03 @ 22:14)          RADIOLOGY & ADDITIONAL TESTS:  < from: Xray Chest 1 View- PORTABLE-Urgent (05.03.18 @ 17:49) >   Increased pulmonary vascular congestion noted. No gross   consolidation is seen.             < end of copied text >      Care Discussed with [X] Consultants  [ ] Patient  [ ] Family  [X]   /   [ ] Other; RN  DVT prophylaxis [ ] lovenox   [ ] subq heparin  [ ] coumadin  [ ] venodynes [ ] ambulating frequently at how risk for vte and no pharm         or  mechanical prophylaxis required    [ ] other   Advanced directive:    [ ]pt has hcp     [ ] pt declined to assign hcp  Discussed with pt @ bedside

## 2018-05-07 NOTE — PROGRESS NOTE ADULT - ASSESSMENT
The patient is a 68 year old female with a history of HTN, DM, CAD s/p PCI in 2007, ESRD not on HD who presents with chest pain and shortness of breath.    Plan:  - Chest pain likely musculoskeletal in etiology given reproducibility of pain. Currently pain free.  - Troponin mildly elevated at 0.159 and trended down. Likely due to acute diastolic heart failure and retention of enzymes from ESRD. No need to trend further.  - Echocardiogram with low normal LV systolic function, no significant valve issues.  - Continue aspirin  - Hold clopidogrel in preparation for AVF. This does not necessarily have to be restarted as PCI was over a decade ago.  - Continue losartan, nifedipine, metoprolol, isordil for HTN  - Continue furosemide 40 mg PO bid  - Renal follow-up  - Vascular eval  - No cardiac contraindication to proceeding with AVF if/when that is the plan

## 2018-05-07 NOTE — PROGRESS NOTE ADULT - PROBLEM SELECTOR PLAN 2
Likely anemia of chronic disease given CKD.    iron studies show low TIBC, high Ferritin  f/u FOBT    [facility] medical student will get out pt lab result

## 2018-05-07 NOTE — PROGRESS NOTE ADULT - SUBJECTIVE AND OBJECTIVE BOX
Chief Complaint: Chest pain, shortness of breath    Interval Events: No events overnight. No complaints.    Review of Systems:  General: No fevers, chills, weight loss or gain  Skin: No rashes, color changes  Cardiovascular: No chest pain, orthopnea  Respiratory: No shortness of breath, cough  Gastrointestinal: No nausea, abdominal pain  Genitourinary: No incontinence, pain with urination  Musculoskeletal: No pain, swelling, decreased range of motion  Neurological: No headache, weakness  Psychiatric: No depression, anxiety  Endocrine: No weight loss or gain, increased thirst  All other systems are comprehensively negative.    Physical Exam:  Vital Signs Last 24 Hrs  T(C): 37 (07 May 2018 08:04), Max: 37 (07 May 2018 08:04)  T(F): 98.6 (07 May 2018 08:04), Max: 98.6 (07 May 2018 08:04)  HR: 68 (07 May 2018 08:04) (67 - 71)  BP: 120/65 (07 May 2018 08:04) (119/66 - 164/73)  BP(mean): --  RR: 19 (07 May 2018 08:04) (16 - 22)  SpO2: 97% (07 May 2018 08:04) (95% - 99%)  General: NAD  HEENT: MMM  Neck: No JVD, no carotid bruit  Lungs: CTAB  CV: RRR, nl S1/S2, no M/R/G  Abdomen: S/NT/ND, +BS  Extremities: No LE edema, no cyanosis  Neuro: AAOx3, non-focal  Skin: No rash    Labs:             05-07    139  |  105  |  82<H>  ----------------------------<  136<H>  4.7   |  26  |  5.70<H>    Ca    8.6      07 May 2018 05:48  Phos  5.9     05-07    TPro  x   /  Alb  2.8<L>  /  TBili  x   /  DBili  x   /  AST  x   /  ALT  x   /  AlkPhos  x   05-07                        7.3    11.2  )-----------( 221      ( 07 May 2018 05:48 )             23.0         Telemetry: Sinus rhythm

## 2018-05-07 NOTE — PROGRESS NOTE ADULT - PROBLEM SELECTOR PLAN 3
proBNP elevated 2/2 to  acute on chronic diastolic heart failure  iv lasix daily wt , strict /os   Dr. Mena (Cardio) consulted proBNP elevated 2/2 to  acute on chronic diastolic heart failure  Echocardiogram with low normal LV systolic function, no significant valve issues.  iv lasix daily wt , strict /os   Dr. Mena (Cardio) consulted

## 2018-05-07 NOTE — PROGRESS NOTE ADULT - PROBLEM SELECTOR PLAN 7
hx MI 2007 s/p stents   - No acute EKG change  - Hold ASA, plavix 2/2 to temp cath in AM,   - c/w statin   - Cardio (Dr. Mosher) consulted.

## 2018-05-07 NOTE — PROGRESS NOTE ADULT - ASSESSMENT
68F PMHx CAD (3 stents 2007), DM type 2 on insulin, Peripheral neuropathy, HTN transferred from [place] ED for hemodialysis due to electrolyte abnormalities, shortness of breath sec to acute on chr diastolic chf , chest pain. Patient to go for temp cath tomorrow to initiate HD.

## 2018-05-07 NOTE — PROGRESS NOTE ADULT - PROBLEM SELECTOR PLAN 6
type2   hba1c 7.9  - low dose ISS with accuchecks, hypoglycemic protocol  - Home lantus dose (30 AM, 10 PM) adjusted to (26 AM, 8 qhs)  - Diet: consistent carb

## 2018-05-07 NOTE — PROGRESS NOTE ADULT - SUBJECTIVE AND OBJECTIVE BOX
Patient is a 68y old  Female who presents with a chief complaint of SOB, mild chest pain (04 May 2018 12:48)      Patient seen in follow up for CKD 5, fluid overload. Pt resting in bed. Family at bedside.     PAST MEDICAL HISTORY:  CAD (coronary artery disease)  Diabetes  CRF (chronic renal failure)  Hypertension  Pneumonia  Myocardial infarction  Hypertension  Diabetes    MEDICATIONS  (STANDING):  atorvastatin 20 milliGRAM(s) Oral at bedtime  dextrose 5%. 1000 milliLiter(s) (50 mL/Hr) IV Continuous <Continuous>  dextrose 50% Injectable 12.5 Gram(s) IV Push once  dextrose 50% Injectable 25 Gram(s) IV Push once  dextrose 50% Injectable 25 Gram(s) IV Push once  epoetin analilia Injectable 97103 Unit(s) SubCutaneous <User Schedule>  furosemide    Tablet 40 milliGRAM(s) Oral two times a day  gabapentin 300 milliGRAM(s) Oral two times a day  insulin glargine Injectable (LANTUS) 8 Unit(s) SubCutaneous at bedtime  insulin glargine Injectable (LANTUS) 26 Unit(s) SubCutaneous every morning  insulin lispro (HumaLOG) corrective regimen sliding scale   SubCutaneous three times a day before meals  iron sucrose Injectable 100 milliGRAM(s) IV Push daily  isosorbide   dinitrate Tablet (ISORDIL) 30 milliGRAM(s) Oral three times a day  metoprolol tartrate 50 milliGRAM(s) Oral two times a day  NIFEdipine XL 60 milliGRAM(s) Oral daily    MEDICATIONS  (PRN):  dextrose Gel 1 Dose(s) Oral once PRN Blood Glucose LESS THAN 70 milliGRAM(s)/deciliter  glucagon  Injectable 1 milliGRAM(s) IntraMuscular once PRN Glucose LESS THAN 70 milligrams/deciliter    T(C): 36.4 (05-07-18 @ 11:40), Max: 37 (05-07-18 @ 08:04)  HR: 63 (05-07-18 @ 11:40) (63 - 71)  BP: 125/73 (05-07-18 @ 11:40) (119/66 - 164/73)  RR: 16 (05-07-18 @ 11:40) (16 - 22)  SpO2: 97% (05-07-18 @ 11:40) (95% - 99%)  Wt(kg): --  I&O's Detail      PHYSICAL EXAM:  General: NAD  Respiratory: b/l air entry  Cardiovascular: S1 S2  Gastrointestinal: soft  Extremities:  + edema                          7.3    11.2  )-----------( 221      ( 07 May 2018 05:48 )             23.0     05-07    139  |  105  |  82<H>  ----------------------------<  136<H>  4.7   |  26  |  5.70<H>    Ca    8.6      07 May 2018 05:48  Phos  5.9     05-07    TPro  x   /  Alb  2.8<L>  /  TBili  x   /  DBili  x   /  AST  x   /  ALT  x   /  AlkPhos  x   05-07        LIVER FUNCTIONS - ( 07 May 2018 05:48 )  Alb: 2.8 g/dL / Pro: x     / ALK PHOS: x     / ALT: x     / AST: x     / GGT: x               Sodium, Serum: 139 (05-07 @ 05:48)  Sodium, Serum: 141 (05-06 @ 05:49)  Sodium, Serum: 142 (05-05 @ 06:23)  Sodium, Serum: 147 (05-04 @ 06:38)    Creatinine, Serum: 5.70 (05-07 @ 05:48)  Creatinine, Serum: 5.70 (05-06 @ 05:49)  Creatinine, Serum: 5.20 (05-05 @ 06:23)  Creatinine, Serum: 5.00 (05-04 @ 06:38)  Creatinine, Serum: 5.00 (05-04 @ 02:18)  Creatinine, Serum: 4.88 (05-03 @ 17:53)    Potassium, Serum: 4.7 (05-07 @ 05:48)  Potassium, Serum: 4.3 (05-06 @ 05:49)  Potassium, Serum: 4.7 (05-05 @ 06:23)  Potassium, Serum: 4.9 (05-04 @ 06:38)    Hemoglobin: 7.3 (05-07 @ 05:48)  Hemoglobin: 7.2 (05-06 @ 05:49)  Hemoglobin: 7.1 (05-05 @ 10:13)  Hemoglobin: 7.0 (05-05 @ 06:23)    < from: US Renal (05.05.18 @ 17:19) >  EXAM:  US KIDNEY(S)                            PROCEDURE DATE:  05/05/2018          INTERPRETATION:  RENAL ULTRASOUND    CLINICAL INFORMATION:  Acute kidney injury on chronic kidney disease.    PROCEDURE:  Real-time ultrasound images of the kidneyswere performed by   technologist and made available for review.    COMPARISONS: None available    FINDINGS:   The right and left kidneys measure approximately 8.3 cm and   9.2 cm, respectively in length.     There is increased echogenicity of bilateral kidneys which may be   associated with renal parenchymal disease.    There is a possible small amount of perinephric fluid bilateral kidneys.  No hydronephrosis is noted. No shadowing intrarenal calcification is   noted.    There are small bilateral renal cysts.     The urinary bladder is unremarkable in appearance. The bilateral ureteral   jets are not visualized.     Bilateral ureteral jets were visualized.         IMPRESSION:      Echogenic kidneys as discussed which may be associated with renal   parenchymal disease.  Possible small bilateral perinephric fluid.    < end of copied text >

## 2018-05-07 NOTE — PROGRESS NOTE ADULT - PROBLEM SELECTOR PLAN 5
Chronic.   home meds/ metoprolol , Nifedipine , lasix , add hydralazine if needed   hold losartan 2/2 jerri.

## 2018-05-07 NOTE — PROGRESS NOTE ADULT - PROBLEM SELECTOR PLAN 1
jerri on CKD  To go for temp cath and start HD tomorrow  As per cardio, ok to hold plavix, ASA  previous refusal of dialysis as outpt  s/p kayexalate and Albuterol for hypokalemia, repeat k wnl   Dr. Albarran (Renal) on board

## 2018-05-07 NOTE — PROGRESS NOTE ADULT - PROBLEM SELECTOR PLAN 4
like [facility] as per cardio  Dr. Mena (Cardio)   high troponin increase demand ischemia sec to chf, trending down  pt have hx cad 2 stent 2007 on asa/ Plavix statin

## 2018-05-07 NOTE — PROGRESS NOTE ADULT - ASSESSMENT
·	? MIRNA, CKD 4/5  ·	CHF  ·	Anemia  ·	Hyperkalemia  ·	Diabetes  ·	Hypertension    On PO diuretics. Will have IR place temporary catheter and initiate HD tomorrow.   D/w family at bedside. Risks and benefits explained in detail. Consent for HD obtained.   Monitor h/h trend. On IV iron and procrit. K levels better.   Monitor blood sugar levels. Insulin coverage as needed. Dietary restriction.   Monitor BP trend. Titrate BP meds as needed. Salt restriction.    for out pt dialysis arrangements. ·	? MIRNA, CKD 4/5  ·	CHF  ·	Anemia  ·	Hyperkalemia  ·	Diabetes  ·	Hypertension    On PO diuretics. Will have IR place temporary catheter and initiate HD tomorrow.   D/w family at bedside. Risks and benefits explained in detail. Consent for HD obtained.   Monitor h/h trend. On IV iron and procrit. K levels better. Will hold ASA, Plavix as requested by IR for permacath placement.   Monitor blood sugar levels. Insulin coverage as needed. Dietary restriction.   Monitor BP trend. Titrate BP meds as needed. Salt restriction.  for out pt dialysis arrangements.

## 2018-05-08 ENCOUNTER — TRANSCRIPTION ENCOUNTER (OUTPATIENT)
Age: 68
End: 2018-05-08

## 2018-05-08 LAB
ANION GAP SERPL CALC-SCNC: 12 MMOL/L — SIGNIFICANT CHANGE UP (ref 5–17)
BUN SERPL-MCNC: 89 MG/DL — HIGH (ref 7–23)
CALCIUM SERPL-MCNC: 8.6 MG/DL — SIGNIFICANT CHANGE UP (ref 8.5–10.1)
CHLORIDE SERPL-SCNC: 104 MMOL/L — SIGNIFICANT CHANGE UP (ref 96–108)
CO2 SERPL-SCNC: 25 MMOL/L — SIGNIFICANT CHANGE UP (ref 22–31)
CREAT SERPL-MCNC: 6 MG/DL — HIGH (ref 0.5–1.3)
CULTURE RESULTS: SIGNIFICANT CHANGE UP
CULTURE RESULTS: SIGNIFICANT CHANGE UP
GLUCOSE SERPL-MCNC: 141 MG/DL — HIGH (ref 70–99)
HCT VFR BLD CALC: 23.7 % — LOW (ref 34.5–45)
HGB BLD-MCNC: 7.3 G/DL — LOW (ref 11.5–15.5)
INR BLD: 1.3 RATIO — HIGH (ref 0.88–1.16)
MCHC RBC-ENTMCNC: 26.1 PG — LOW (ref 27–34)
MCHC RBC-ENTMCNC: 30.9 GM/DL — LOW (ref 32–36)
MCV RBC AUTO: 84.5 FL — SIGNIFICANT CHANGE UP (ref 80–100)
PLATELET # BLD AUTO: 232 K/UL — SIGNIFICANT CHANGE UP (ref 150–400)
POTASSIUM SERPL-MCNC: 4.3 MMOL/L — SIGNIFICANT CHANGE UP (ref 3.5–5.3)
POTASSIUM SERPL-SCNC: 4.3 MMOL/L — SIGNIFICANT CHANGE UP (ref 3.5–5.3)
PROTHROM AB SERPL-ACNC: 14.2 SEC — HIGH (ref 9.8–12.7)
RBC # BLD: 2.8 M/UL — LOW (ref 3.8–5.2)
RBC # FLD: 14.4 % — SIGNIFICANT CHANGE UP (ref 10.3–14.5)
SODIUM SERPL-SCNC: 141 MMOL/L — SIGNIFICANT CHANGE UP (ref 135–145)
SPECIMEN SOURCE: SIGNIFICANT CHANGE UP
SPECIMEN SOURCE: SIGNIFICANT CHANGE UP
WBC # BLD: 11.1 K/UL — HIGH (ref 3.8–10.5)
WBC # FLD AUTO: 11.1 K/UL — HIGH (ref 3.8–10.5)

## 2018-05-08 PROCEDURE — 76937 US GUIDE VASCULAR ACCESS: CPT | Mod: 26

## 2018-05-08 PROCEDURE — 99233 SBSQ HOSP IP/OBS HIGH 50: CPT | Mod: GC

## 2018-05-08 PROCEDURE — 36556 INSERT NON-TUNNEL CV CATH: CPT

## 2018-05-08 RX ORDER — ASPIRIN/CALCIUM CARB/MAGNESIUM 324 MG
81 TABLET ORAL DAILY
Qty: 0 | Refills: 0 | Status: DISCONTINUED | OUTPATIENT
Start: 2018-05-08 | End: 2018-05-10

## 2018-05-08 RX ADMIN — INSULIN GLARGINE 8 UNIT(S): 100 INJECTION, SOLUTION SUBCUTANEOUS at 21:46

## 2018-05-08 RX ADMIN — Medication 81 MILLIGRAM(S): at 19:05

## 2018-05-08 RX ADMIN — Medication 50 MILLIGRAM(S): at 18:17

## 2018-05-08 RX ADMIN — SIMETHICONE 80 MILLIGRAM(S): 80 TABLET, CHEWABLE ORAL at 18:18

## 2018-05-08 RX ADMIN — IRON SUCROSE 100 MILLIGRAM(S): 20 INJECTION, SOLUTION INTRAVENOUS at 12:46

## 2018-05-08 RX ADMIN — ISOSORBIDE DINITRATE 30 MILLIGRAM(S): 5 TABLET ORAL at 05:42

## 2018-05-08 RX ADMIN — ISOSORBIDE DINITRATE 30 MILLIGRAM(S): 5 TABLET ORAL at 18:17

## 2018-05-08 RX ADMIN — ATORVASTATIN CALCIUM 20 MILLIGRAM(S): 80 TABLET, FILM COATED ORAL at 21:47

## 2018-05-08 RX ADMIN — Medication 40 MILLIGRAM(S): at 05:42

## 2018-05-08 RX ADMIN — Medication 100 MILLIGRAM(S): at 18:17

## 2018-05-08 RX ADMIN — Medication 50 MILLIGRAM(S): at 05:41

## 2018-05-08 RX ADMIN — Medication 60 MILLIGRAM(S): at 05:41

## 2018-05-08 RX ADMIN — GABAPENTIN 300 MILLIGRAM(S): 400 CAPSULE ORAL at 18:17

## 2018-05-08 RX ADMIN — ISOSORBIDE DINITRATE 30 MILLIGRAM(S): 5 TABLET ORAL at 23:32

## 2018-05-08 RX ADMIN — Medication 40 MILLIGRAM(S): at 18:18

## 2018-05-08 RX ADMIN — GABAPENTIN 300 MILLIGRAM(S): 400 CAPSULE ORAL at 05:42

## 2018-05-08 RX ADMIN — SENNA PLUS 2 TABLET(S): 8.6 TABLET ORAL at 21:47

## 2018-05-08 NOTE — DISCHARGE NOTE ADULT - CARE PLAN
Principal Discharge DX:	Renal failure  Secondary Diagnosis:	Anemia, unspecified type  Secondary Diagnosis:	Hypertension  Secondary Diagnosis:	Diabetes  Secondary Diagnosis:	CAD (coronary artery disease) Principal Discharge DX:	Renal failure  Goal:	Management of Renal Indices  Assessment and plan of treatment:	Permacath placed for Outpatient hemodialysis  Follow up with PMD  Secondary Diagnosis:	Anemia, unspecified type  Assessment and plan of treatment:	Anemia of chronic disease  Continue epoetin  Follow up with PMD  Secondary Diagnosis:	Hypertension  Assessment and plan of treatment:	Continue home medications  Follow with cardiologist and PMD  Secondary Diagnosis:	Diabetes  Assessment and plan of treatment:	Continue home insulin regimen  Monitor blood glucose at home  Follow up with PMD  Secondary Diagnosis:	CAD (coronary artery disease)  Assessment and plan of treatment:	Continue aspirin and clopidogrel  Follow up with cardiologist and PMD

## 2018-05-08 NOTE — DIETITIAN INITIAL EVALUATION ADULT. - NS FNS WEIGHT USED FOR CALC
209.7 pounds for protein energy calculations; Defer fluid needs to primary and nephrology team/admission

## 2018-05-08 NOTE — DIETITIAN INITIAL EVALUATION ADULT. - OTHER INFO
Pt seen for elevated HbA1c and initiation of HD. Pt reports managing T2DM PTA by checking BG daily (typically 120-150mg/dL), taking 30units Lantus in AM, taking 10units Lantus in PM, and denies restricting carbohydrate intake. HbA1c of 7.9% indicates fairly good control. Reports NKFA.

## 2018-05-08 NOTE — DISCHARGE NOTE ADULT - HOSPITAL COURSE
68F PMHx CAD (3 stents 2007), DM type 2 on insulin, Peripheral neuropathy, HTN, CKD now ESRD transferred from [place] [facility] for HD. Presented to [place] ED with progressive SOB and CP x 2 days, mildly relieved by Nitroglycerin. Per patient (translated by Angel in [place]), she has had progressive SOB and mild cp over the past week. She has no prior history of a similar complaint. The SOB is made worse with activity and lying flat at night. She describes the chest pain and a dull pain without radiation rated 3/10. She denies recent travel or sick contacts. Per grandson, patient has been told by nephrologist she needs dialysis but has previously refused.     In the [place] ED Hg 7.5, K+ 5.8, Cr 4.88, lactate 0.5, proBNP 7456, trop negative x1.  UA grossly negative, RVP negative, CXR showing increased pulmonary vascular congestion. Upon arrival to [place] /80, T 98.5, HR 62, RR 16, SpO2 100% on O2, given Albuterol and Kayexalate.     Patient complained of chest pain. Dr. Mena (Cardio) was consulted, and determined that CP likely MSK. Troponin increase observed likely due to demand ischemia secondary to chf, and remained high due to renal failure. Troponins trended down.     To address renal failure, patient had right IJ  temporary cath placed 5/8, and underwent hemodialysis. Patient had temp cath removed and had permacath placed 5/11. Renal indices were seen to improve after HD     To address SOB related to acute on chronic CHF, lasix was initially given, as well as Isordil. Patient fluid overload resolved, edema improved, and dyspnea resolved.    Patient was medically optimized to be discharged home. 68F PMHx CAD (3 stents 2007), DM type 2 on insulin, Peripheral neuropathy, HTN, CKD now ESRD transferred from [place] [facility] for HD. Presented to [place] ED with progressive SOB and CP x 2 days, mildly relieved by Nitroglycerin. Per patient (translated by Angel in [place]), she has had progressive SOB and mild cp over the past week. She has no prior history of a similar complaint. The SOB is made worse with activity and lying flat at night. She describes the chest pain and a dull pain without radiation rated 3/10. She denies recent travel or sick contacts. Per grandson, patient has been told by nephrologist she needs dialysis but has previously refused.     In the [place] ED Hg 7.5, K+ 5.8, Cr 4.88, lactate 0.5, proBNP 7456, trop negative x1.  UA grossly negative, RVP negative, CXR showing increased pulmonary vascular congestion. Upon arrival to [place] /80, T 98.5, HR 62, RR 16, SpO2 100% on O2, given Albuterol and Kayexalate.     Patient complained of chest pain. Dr. Mena (Cardio) was consulted, and determined that CP likely MSK. Troponin increase observed likely due to demand ischemia secondary to chf, and remained high due to renal failure. Troponins trended down.     To address renal failure, patient had right IJ  temporary cath placed 5/8, and underwent hemodialysis. Patient had temp cath removed and had permacath placed 5/11. Renal indices were seen to improve after HD     To address SOB related to acute on chronic CHF, lasix was initially given, as well as Isordil. Patient fluid overload resolved, edema improved, and dyspnea resolved.    Patient was medically optimized to be discharged home.     Patient was seen and examined on 5/14/18 prior to discharge    PHYSICAL EXAM:  GENERAL: NAD, elder, obese  HEAD:  Atraumatic, Normocephalic  EYES: EOMI, PERRLA, conjunctiva and sclera clear  ENMT: No tonsillar erythema, exudates, or enlargement; Moist mucous membranes, Good dentition, No lesions  NECK: Supple, No JVD, Normal thyroid  NERVOUS SYSTEM:  Alert & Oriented X3, Good concentration; Motor Strength 5/5 B/L upper and lower extremities; DTRs 2+ intact and symmetric  CHEST/LUNG: Clear to percussion bilaterally; No rales, rhonchi, wheezing, or rubs  HEART: Regular rate and rhythm; No murmurs, rubs, or gallops  ABDOMEN: Soft, Nontender, Nondistended; Bowel sounds present  EXTREMITIES:  2+ Peripheral Pulses, No clubbing, cyanosis, or edema  SKIN: permacath in right upper chest dressing c/d/i, tender around area of cath but no erythema 68F PMHx CAD (3 stents 2007), DM type 2 on insulin, Peripheral neuropathy, HTN, CKD now ESRD transferred from [place] [facility] for HD. Presented to [place] [facility] with progressive SOB and CP x 2 days, mildly relieved by Nitroglycerin. Per patient (translated by Angel in [place]), she has had progressive SOB and mild cp over the past week. She has no prior history of a similar complaint. The SOB is made worse with activity and lying flat at night. She describes the chest pain and a dull pain without radiation rated 3/10. She denies recent travel or sick contacts. Per grandson, patient has been told by nephrologist she needs dialysis but has previously refused. In the [place] ED Hg 7.5, K+ 5.8, Cr 4.88, lactate 0.5, proBNP 7456, trop negative x1.  UA grossly negative, RVP negative, CXR showing increased pulmonary vascular congestion. Upon arrival to [place] /80, T 98.5, HR 62, RR 16, SpO2 100% on O2, given Albuterol and Kayexalate. Patient complained of chest pain. Dr. Mena (Cardio) was consulted, and determined that CP likely MSK. Troponin increase observed likely due to demand ischemia secondary to chf, and remained high due to renal failure. Troponins trended down. To address renal failure, patient had right IJ  temporary cath placed 5/8, and underwent hemodialysis. Patient had temp cath removed and had permacath placed 5/11. Renal indices were seen to improve after HD To address SOB related to acute on chronic CHF, lasix was initially given, as well as Isordil. Patient fluid overload resolved, edema improved, and dyspnea resolved. Patient was medically stable and improved to be discharged home.   Patient was seen and examined on 5/14/18 prior to discharge. Pt is scheduled for HD mwf at 6pm.     PHYSICAL EXAM:  GENERAL: NAD, elder, obese  HEAD:  Atraumatic, Normocephalic  EYES: EOMI, PERRLA, conjunctiva and sclera clear  ENMT: No tonsillar erythema, exudates, or enlargement; Moist mucous membranes, Good dentition, No lesions  NECK: Supple, No JVD, Normal thyroid  NERVOUS SYSTEM:  Alert & Oriented X3, Good concentration; Motor Strength 5/5 B/L upper and lower extremities; DTRs 2+ intact and symmetric  CHEST/LUNG: Clear to percussion bilaterally; No rales, rhonchi, wheezing, or rubs  HEART: Regular rate and rhythm; No murmurs, rubs, or gallops  ABDOMEN: Soft, Nontender, Nondistended; Bowel sounds present  EXTREMITIES:  2+ Peripheral Pulses, No clubbing, cyanosis, or edema  SKIN: permacath in right upper chest dressing c/d/i, tender around area of cath but no erythema    Time spent: 65 minutes  PMD office made aware of discharge 68F PMHx CAD (3 stents 2007), DM type 2 on insulin, Peripheral neuropathy, HTN, CKD now ESRD transferred from [place] [facility] for HD. Presented to [place] ED with progressive SOB and CP x 2 days, mildly relieved by Nitroglycerin. Per patient (translated by Angel in [place]), she has had progressive SOB and mild cp over the past week. She has no prior history of a similar complaint. The SOB is made worse with activity and lying flat at night. She describes the chest pain and a dull pain without radiation rated 3/10. She denies recent travel or sick contacts. Per grandson, patient has been told by nephrologist she needs dialysis but has previously refused. In the [place] ED Hg 7.5, K+ 5.8, Cr 4.88, lactate 0.5, proBNP 7456, trop negative x1.  UA grossly negative, RVP negative, CXR showing increased pulmonary vascular congestion. Upon arrival to [place] /80, T 98.5, HR 62, RR 16, SpO2 100% on O2, given Albuterol and Kayexalate. Patient complained of chest pain. Dr. Mena (Cardio) was consulted, and determined that CP likely MSK. Troponin increase observed likely due to demand ischemia secondary to chf, and remained high due to renal failure. Troponins trended down. To address renal failure, patient had right IJ  temporary cath placed 5/8, and underwent hemodialysis. Patient had temp cath removed and had permacath placed 5/11. Renal indices were seen to improve after HD To address SOB related to acute on chronic CHF, lasix was initially given, as well as Isordil. Patient fluid overload resolved, edema improved, and dyspnea resolved. Patient was medically stable and improved to be discharged home.   Patient was seen and examined on 5/14/18 prior to discharge. Pt is scheduled for HD mwf at 6pm.     PHYSICAL EXAM:  GENERAL: NAD, elder, obese  HEAD:  Atraumatic, Normocephalic  EYES: EOMI, PERRLA, conjunctiva and sclera clear  ENMT: No tonsillar erythema, exudates, or enlargement; Moist mucous membranes, Good dentition, No lesions  NECK: Supple, No JVD, Normal thyroid  NERVOUS SYSTEM:  Alert & Oriented X3, Good concentration; Motor Strength 5/5 B/L upper and lower extremities; DTRs 2+ intact and symmetric  CHEST/LUNG: Clear to percussion bilaterally; No rales, rhonchi, wheezing, or rubs  HEART: Regular rate and rhythm; No murmurs, rubs, or gallops  ABDOMEN: Soft, Nontender, Nondistended; Bowel sounds present  EXTREMITIES:  2+ Peripheral Pulses, No clubbing, cyanosis, or edema  SKIN: permacath in right upper chest dressing c/d/i, tender around area of cath but no erythema    Time spent: 65 minutes

## 2018-05-08 NOTE — PROGRESS NOTE ADULT - SUBJECTIVE AND OBJECTIVE BOX
INTERVAL HPI/OVERNIGHT EVENTS:    68F PMHx CAD (3 stents 2007), DM type 2 on insulin, Peripheral neuropathy, HTN transferred from [place] ED for hemodialysis due to electrolyte abnormalities, shortness of breath 2/2 acute on chronic diastolic chf, chest pain possible atypical.    No events overnight. Patient went for a right IJ permacath this AM, in preparation for HD. Patient seen and examined at bedside during HD. Patient c/o HA, but otherwise no complaints.        MEDICATIONS  (STANDING):  atorvastatin 20 milliGRAM(s) Oral at bedtime  dextrose 5%. 1000 milliLiter(s) (50 mL/Hr) IV Continuous <Continuous>  dextrose 50% Injectable 12.5 Gram(s) IV Push once  dextrose 50% Injectable 25 Gram(s) IV Push once  dextrose 50% Injectable 25 Gram(s) IV Push once  docusate sodium 100 milliGRAM(s) Oral two times a day  epoetin analilia Injectable 83952 Unit(s) SubCutaneous <User Schedule>  furosemide    Tablet 40 milliGRAM(s) Oral two times a day  gabapentin 300 milliGRAM(s) Oral two times a day  insulin glargine Injectable (LANTUS) 8 Unit(s) SubCutaneous at bedtime  insulin glargine Injectable (LANTUS) 26 Unit(s) SubCutaneous every morning  insulin lispro (HumaLOG) corrective regimen sliding scale   SubCutaneous three times a day before meals  iron sucrose Injectable 100 milliGRAM(s) IV Push daily  isosorbide   dinitrate Tablet (ISORDIL) 30 milliGRAM(s) Oral three times a day  metoprolol tartrate 50 milliGRAM(s) Oral two times a day  NIFEdipine XL 60 milliGRAM(s) Oral daily  senna 2 Tablet(s) Oral at bedtime    MEDICATIONS  (PRN):  dextrose Gel 1 Dose(s) Oral once PRN Blood Glucose LESS THAN 70 milliGRAM(s)/deciliter  glucagon  Injectable 1 milliGRAM(s) IntraMuscular once PRN Glucose LESS THAN 70 milligrams/deciliter  simethicone 80 milliGRAM(s) Chew daily PRN Heartburn      Allergies    No Known Allergies    Intolerances        CONSTITUTIONAL: No weakness, fevers or chills  EYES/ENT: No visual changes;  No vertigo or throat pain   NECK: No pain or stiffness  RESPIRATORY: No cough, wheezing, hemoptysis; No shortness of breath  CARDIOVASCULAR: No chest pain or palpitations  GASTROINTESTINAL: No abdominal or epigastric pain. No nausea, vomiting, or hematemesis; No diarrhea or constipation. No melena or hematochezia.  GENITOURINARY: No dysuria, frequency or hematuria  NEUROLOGICAL: No numbness or weakness  SKIN: No itching, burning, rashes, or lesions   All other review of systems is negative unless indicated above.    Vital Signs Last 24 Hrs  T(C): 36.3 (08 May 2018 12:39), Max: 36.7 (08 May 2018 04:40)  T(F): 97.3 (08 May 2018 12:39), Max: 98 (08 May 2018 04:40)  HR: 62 (08 May 2018 12:39) (62 - 73)  BP: 125/78 (08 May 2018 12:39) (116/63 - 146/67)  BP(mean): --  RR: 15 (08 May 2018 12:39) (15 - 21)  SpO2: 98% (08 May 2018 12:39) (94% - 98%)    05-07 @ 07:01  -  05-08 @ 07:00  --------------------------------------------------------  IN: 490 mL / OUT: 0 mL / NET: 490 mL    05-08 @ 07:01  -  05-08 @ 15:32  --------------------------------------------------------  IN: 0 mL / OUT: 150 mL / NET: -150 mL        PHYSICAL EXAM:  GENERAL: NAD, well-groomed, well-developed  HEAD:  Atraumatic, Normocephalic  EYES: EOMI, PERRLA, conjunctiva and sclera clear  ENMT: No tonsillar erythema, exudates, or enlargement; Moist mucous membranes, Good dentition, No lesions  NECK: Supple, No JVD, Normal thyroid  NERVOUS SYSTEM:  Alert & Oriented X3, Good concentration; Motor Strength 5/5 B/L upper and lower extremities; DTRs 2+ intact and symmetric  CHEST/LUNG: Clear to auscultation bilaterally; No rales, rhonchi, wheezing, or rubs  HEART: Regular rate and rhythm; No murmurs, rubs, or gallops  ABDOMEN: Soft, Nontender, Nondistended; Bowel sounds present  EXTREMITIES:  2+ Peripheral Pulses, No clubbing, cyanosis, or edema  LYMPH: No lymphadenopathy noted  SKIN: No rashes or lesions        LABS:                        7.3    11.1  )-----------( 232      ( 08 May 2018 06:39 )             23.7     05-08    141  |  104  |  89<H>  ----------------------------<  141<H>  4.3   |  25  |  6.00<H>    Ca    8.6      08 May 2018 06:39  Phos  5.9     05-07    TPro  x   /  Alb  2.8<L>  /  TBili  x   /  DBili  x   /  AST  x   /  ALT  x   /  AlkPhos  x   05-07    PT/INR - ( 08 May 2018 06:39 )   PT: 14.2 sec;   INR: 1.30 ratio               RADIOLOGY & ADDITIONAL TESTS:    < from: US Guided Vascular Access (05.08.18 @ 12:14) >    EXAM:  IR PROCEDURE NON PICC                          EXAM:  US GUIDANCE VASCULAR ACCESS                            PROCEDURE DATE:  05/08/2018          INTERPRETATION:    Temporary dialysis catheter placement:    Clinical History:    Acute renal insufficiency.    Technique:    An informed consent obtained. Risks and benefits explained. Sedation   provided by anesthesiologist.    Patient placed supine on the angiographic table. Time out procedure was   performed. Limited ultrasonography demonstrated patent right internal   jugular vein.   The right neck prepped and draped in sterile fashion. 1% lidocaine   utilized for local anesthesia. Under ultrasound guidance, the   rightinternal jugular vein accessed using micropuncture needle. Hard copy   ultrasound image obtained. Under fluoroscopy, needle exchanged to a   microcatheter over the microwire. Through the microcatheter, a J   guidewire placed into the superior vena cava. Over the J guidewire,   access tract dilated with a 8 and 12 Armenian dilator. Subsequently, 14.5   Armenian dual-lumen temporary dialysis catheter placed under fluoroscopy   with the tip in distal superior vena cava. Hemostasis secured by manual   compression. The catheter anchored to the skin using suture. The site   dressed in sterile fashion. Both ports flushed with normal saline. Both   the ports filled with 5000 units heparin fluid. Patient tolerated the   procedure well. Patient transferred to floor for further observation and   management in stable condition.    Impression:    Successful ultrasound and fluoroscopy guided right internal jugular vein   temporary nontunneled dialysis catheter placement. Catheter is ready for   hemodialysis.    < end of copied text >      < from: US Renal (05.05.18 @ 17:19) >    EXAM:  US KIDNEY(S)                            PROCEDURE DATE:  05/05/2018          INTERPRETATION:  RENAL ULTRASOUND    CLINICAL INFORMATION:  Acute kidney injury on chronic kidney disease.    PROCEDURE:  Real-time ultrasound images of the kidneyswere performed by   technologist and made available for review.    COMPARISONS: None available    FINDINGS:   The right and left kidneys measure approximately 8.3 cm and   9.2 cm, respectively in length.     There is increased echogenicity of bilateral kidneys which may be   associated with renal parenchymal disease.    There is a possible small amount of perinephric fluid bilateral kidneys.  No hydronephrosis is noted. No shadowing intrarenal calcification is   noted.    There are small bilateral renal cysts.     The urinary bladder is unremarkable in appearance. The bilateral ureteral   jets are not visualized.     Bilateral ureteral jets were visualized.         IMPRESSION:      Echogenic kidneys as discussed which may be associated with renal   parenchymal disease.  Possible small bilateral perinephric fluid    < end of copied text >      < from: Xray Chest 1 View- PORTABLE-Urgent (05.03.18 @ 17:49) >    EXAM:  XR CHEST PORTABLE URGENT 1V                                  PROCEDURE DATE:  05/03/2018          INTERPRETATION:  Chest radiograph (one view)     CPT 80832    CLINICAL INFORMATION: Shortness of breath of unknown severity since today.    TECHNIQUE:  Single frontal view of the chest was obtained.    FINDINGS:  No previous examinations are available for review.    The lungs demonstrate increased pulmonary vascular congestion. No pleural   effusion is seen. The heart and mediastinum appear intact.           IMPRESSION: Increased pulmonary vascular congestion noted. No gross   consolidation is seen.           < end of copied text >

## 2018-05-08 NOTE — PROGRESS NOTE ADULT - ASSESSMENT
68F PMHx CAD (3 stents 2007), DM type 2 on insulin, Peripheral neuropathy, HTN transferred from [place] ED for hemodialysis due to electrolyte abnormalities, shortness of breath sec to acute on chr diastolic chf , chest pain. Patient went for a right IJ permacath this AM, in preparation for HD.

## 2018-05-08 NOTE — DIETITIAN INITIAL EVALUATION ADULT. - NS AS NUTRI INTERV ED CONTENT
Provided written and verbal renal nutrition therapy education including need for sodium restriction and foods high in sodium to avoid, need for potassium restriction and foods high in potassium to avoid, and need for phosphorus restriction and foods high in phosphorus to avoid. Provided written and verbal T2DM nutrition education including which foods have carbohydrates, meal planning with MyPlate method, pairing carbohydrates with protein and fiber, and the effects of carbohydrates on blood glucose levels.

## 2018-05-08 NOTE — DIETITIAN INITIAL EVALUATION ADULT. - PROBLEM SELECTOR PLAN 3
with CXR showing pulmonary vascular congestion.   fluid overload, s/p lasix 40x2, in need of HD  f/u Echo  proBNP elevated, likely component due to renal failure  Dr. Mena (Cardio) consulted

## 2018-05-08 NOTE — PROGRESS NOTE ADULT - PROBLEM SELECTOR PLAN 4
like MSK as per cardio  Dr. Mena (Cardio)   high troponin increase demand ischemia sec to chf, trending down  likely enzyme in high conc 2/2 renal failure  pt have hx cad 2 stent 2007 on asa/ Plavix statin like MSK as per cardio  Dr. Mena (Cardio)   high troponin increase demand ischemia sec to chf, trending down, ACS ruled out.   likely enzyme in high conc 2/2 renal failure  pt have hx cad 2 stent 2007 on asa/ Plavix statin

## 2018-05-08 NOTE — PROGRESS NOTE ADULT - ASSESSMENT
68 female with a history of DM. HTN, CAD, CHF, CKD stage 5 with anemia now admitted for fluid over load and ESRD.  S/P perma cath placement, will initiate dialysis today and dialyze for 3 hours. Will obtain hepatitis panel. Will have an AVF placed if possible. Will send paper work to [place] dialysis unit. Spoke to the entire family at bed side.

## 2018-05-08 NOTE — DISCHARGE NOTE ADULT - MEDICATION SUMMARY - MEDICATIONS TO STOP TAKING
I will STOP taking the medications listed below when I get home from the hospital:    Lasix 20 mg oral tablet  -- 1 tab(s) by mouth once a day    losartan 50 mg oral tablet  -- 1 tab(s) by mouth once a day    NovoLOG FlexPen 100 units/mL injectable solution  -- 10 unit(s) injectable once a day (in the morning)

## 2018-05-08 NOTE — PROGRESS NOTE ADULT - PROBLEM SELECTOR PLAN 1
jerri on CKD  Patient went for a right IJ permacath this AM  HD today  As per cardio, Aspirin and clopidogrel held for permcath placement, ASA to be resumed after procedure  If there is no plan for AVF this admission, can resume clopidogrel as well  s/p kayexalate and Albuterol for hypokalemia, repeat k wnl   Dr. Albarran (Renal) on board

## 2018-05-08 NOTE — DISCHARGE NOTE ADULT - PLAN OF CARE
Management of Renal Indices Monica placed for Outpatient hemodialysis  Follow up with CLAUDIA Anemia of chronic disease  Continue epoetin  Follow up with PMD Continue home medications  Follow with cardiologist and PMD Continue home insulin regimen  Monitor blood glucose at home  Follow up with CLAUDIA Continue aspirin and clopidogrel  Follow up with cardiologist and PMD

## 2018-05-08 NOTE — PROGRESS NOTE ADULT - PROBLEM SELECTOR PLAN 7
hx MI 2007 s/p stents   - No acute EKG change  - restart ASA  - F/u with Renal if AVF is expected, if not can restart Plavix  - c/w statin   - Cardio (Dr. Mosher) consulted.

## 2018-05-08 NOTE — DISCHARGE NOTE ADULT - PATIENT PORTAL LINK FT
You can access the [facility] Patient Portal, offered by [facility], by registering with the following website: [email or web address]

## 2018-05-08 NOTE — DISCHARGE NOTE ADULT - CARE PROVIDER_API CALL
lillian guan  Phone: (   )    -  Fax: (   )    -    Yoel Mena), Cardiovascular Disease; Internal Medicine  [address] [place] [address]  Phone: [phone]  Fax: [phone] Yoel Mena), Cardiovascular Disease; Internal Medicine  [address] [facility]  [address]  [place] [address]  Phone: [phone]  Fax: [phone]    Everardo Black  Phone: (   )    -  Fax: (   )    - Yoel Mena), Cardiovascular Disease; Internal Medicine  [address] [facility]  [address]  [place] [address]  Phone: [phone]  Fax: [phone]    mahamed bennett  [address] [place] [address]  Phone: [phone]  Fax: (       - Yoel Mena), Cardiovascular Disease; Internal Medicine  [address] [facility]  [address]  [place] [address]  Phone: [phone]  Fax: [phone]    mahamed bennett  [address] [place] [address]  Phone: [phone]  Fax: (   )    -    Jaden Albarran), Medicine  [address] [facility]  [address] [place] [address]  Phone: [phone]  Fax: [phone] Yoel Mena), Cardiovascular Disease; Internal Medicine  [address] [facility]  [address]  [place] [address]  Phone: [phone]  Fax: [phone]    Jaden Albarran), Medicine  [address] [facility]  [address]  [place] [address]  Phone: [phone]  Fax: [phone]    Dr. Major Templeton  Phone numbers:   [phone]  Phone: (   )    -  Fax: (   )    -

## 2018-05-08 NOTE — DIETITIAN INITIAL EVALUATION ADULT. - PERTINENT LABORATORY DATA
(5/8) BUN: 89, Creatinine: 6.00, Serum Glucose: 141, eGFR: 7; (5/4) HbA1c: 7.9%; BG via POCT (5/7)126-144mg/dL (5/8)130mg/dL

## 2018-05-08 NOTE — PROGRESS NOTE ADULT - PROBLEM SELECTOR PLAN 5
Chronic  home meds, metoprolol , Nifedipine , lasix , add hydralazine if needed   hold losartan 2/2 jerri.

## 2018-05-08 NOTE — DISCHARGE NOTE ADULT - ADDITIONAL INSTRUCTIONS
-please follow up with your primary medical doctor, cardiologist and neprhologist Dr. Albarran within 1 week of discharge  -pt to setup all follow up appts

## 2018-05-08 NOTE — PROGRESS NOTE ADULT - PROBLEM SELECTOR PLAN 3
proBNP elevated 2/2 to  acute on chronic diastolic heart failure  Echocardiogram with low normal LV systolic function, no significant valve issues.  iv lasix daily wt , strict /os   Dr. Mena (Cardio) consulted

## 2018-05-08 NOTE — DIETITIAN INITIAL EVALUATION ADULT. - PROBLEM SELECTOR PLAN 2
Unknown baseline, contact PMD (Dr. Everardo Black, [facility]) in AM for baseline Hg; Likely anemia of chronic disease given CKD.   f/u iron studies  f/u FOBT

## 2018-05-08 NOTE — PROGRESS NOTE ADULT - ASSESSMENT
The patient is a 68 year old female with a history of HTN, DM, CAD s/p PCI in 2007, ESRD not on HD who presents with chest pain and shortness of breath.    Plan:  - Chest pain likely musculoskeletal in etiology given reproducibility of pain. Currently pain free.  - Troponin mildly elevated at 0.159 and trended down. Likely due to acute diastolic heart failure and retention of enzymes from ESRD. No need to trend further.  - Echocardiogram with low normal LV systolic function, no significant valve issues.  - Continue losartan, nifedipine, metoprolol, isordil for HTN  - Continue furosemide 40 mg PO bid  - Renal follow-up  - Aspirin and clopidogrel being held for permcath. Resume aspirin post-procedure. If there is no plan for AVF this admission, can resume clopidogrel as well.

## 2018-05-08 NOTE — DISCHARGE NOTE ADULT - MEDICATION SUMMARY - MEDICATIONS TO TAKE
I will START or STAY ON the medications listed below when I get home from the hospital:    Aspirin Enteric Coated 81 mg oral delayed release tablet  -- 1 tab(s) by mouth once a day  -- Indication: For CAD (coronary artery disease)    isosorbide mononitrate 30 mg oral tablet, extended release  -- 2 tab(s) by mouth once a day (in the morning)  -- Indication: For Chest pain, unspecified type    isosorbide mononitrate 30 mg oral tablet, extended release  -- 1 tab(s) by mouth once a day (in the evening)  -- Indication: For Chest pain, unspecified type    gabapentin 300 mg oral capsule  -- 1 cap(s) by mouth 2 times a day  -- Indication: For Diabetes    Lantus Solostar Pen 100 units/mL subcutaneous solution  -- 24 unit(s) subcutaneous once a day (in the morning)   -- Do not drink alcoholic beverages when taking this medication.  It is very important that you take or use this exactly as directed.  Do not skip doses or discontinue unless directed by your doctor.  Keep in refrigerator.  Do not freeze.    -- Indication: For Diabetes    rosuvastatin 20 mg oral tablet  -- 1 tab(s) by mouth once a day  -- Indication: For CAD (coronary artery disease)    clopidogrel 75 mg oral tablet  -- 1 tab(s) by mouth once a day  -- Indication: For CAD (coronary artery disease)    metoprolol tartrate 50 mg oral tablet  -- 1 tab(s) by mouth 2 times a day  -- Indication: For Hypertension    NIFEdipine 60 mg oral tablet, extended release  -- 1 tab(s) by mouth once a day  -- Indication: For Hypertension    epoetin analilia  -- 48182 unit(s) intravenously once a month  -- Indication: For Anemia, unspecified type    ferrous sulfate 325 mg (65 mg elemental iron) oral tablet  -- 1 tab(s) by mouth 3 times a day  -- Indication: For Anemia, unspecified type    docusate  -- 100 milligram(s) by mouth once a day  -- Indication: For Need for prophylactic measure    calcium-vitamin D 500 mg-200 intl units oral tablet  -- 1 tab(s) by mouth once a day  -- Indication: For Need for prophylactic measure

## 2018-05-08 NOTE — DISCHARGE NOTE ADULT - PROVIDER TOKENS
FREE:[LAST:[guan],FIRST:[minder],PHONE:[(   )    -],FAX:[(   )    -]],TOKEN:'[id number]' TOKEN:'[id number]',FREE:[LAST:[Black],FIRST:[Minder],PHONE:[(   )    -],FAX:[(   )    -]] TOKEN:'[id number]',FREE:[LAST:[sabrina],FIRST:[mahamed],PHONE:[phone]:[(   )    -],ADDRESS:[[address] [place] [address]]] TOKEN:'[id number]',FREE:[LAST:[sabrina],FIRST:[mahamed],PHONE:[phone]],FAX:[(   )    -],ADDRESS:[[address] [place] [address]]],TOKEN:'[id number]' TOKEN:'[id number]:'[id number]',FREE:[LAST:[Jareth],FIRST:[Dr. Gonsalves],PHONE:[(   )    -],FAX:[(   )    -],ADDRESS:[Phone numbers:   [phone]]]

## 2018-05-08 NOTE — DIETITIAN INITIAL EVALUATION ADULT. - PROBLEM SELECTOR PLAN 8
DVT ppx - Heparin  Diet - consistent carb  Activity - out of bed with assistance   Fall Precautions    IMPROVE VTE Individual Risk Assessment          RISK                                                          Points  [  ] Previous VTE                                                3  [  ] Thrombophilia                                             2  [  ] Lower limb paralysis                                   2        (unable to hold up >15 seconds)    [  ] Current Cancer                                             2         (within 6 months)  [  ] Immobilization > 24 hrs                              1  [  ] ICU/CCU stay > 24 hours                             1  [ x ] Age > 60                                                         1    IMPROVE VTE Score:

## 2018-05-08 NOTE — DIETITIAN INITIAL EVALUATION ADULT. - SOURCE
family/significant other/patient/Comprehensive medical record review; Multiple family members at bedside

## 2018-05-08 NOTE — PROGRESS NOTE ADULT - PROBLEM SELECTOR PLAN 2
Likely anemia of chronic disease given CKD.    iron studies show low TIBC, high Ferritin  f/u FOBT    [facility] medical student will get out pt lab result

## 2018-05-08 NOTE — DIETITIAN INITIAL EVALUATION ADULT. - ADHERENCE
Denies following modified diet and reports consuming mainly  foods including rice, roti, bread, as well as fruits, vegetables, and meat/fish.

## 2018-05-08 NOTE — DIETITIAN INITIAL EVALUATION ADULT. - PERTINENT MEDS FT
Venofer, Lasix, 8units Lantus PM, 26units Lantus AM, Humalog correction scale, Carmen Vasquez Mylicon

## 2018-05-08 NOTE — PROGRESS NOTE ADULT - SUBJECTIVE AND OBJECTIVE BOX
IRENE TAYLOR  68y  Female    Patient is a 68y old  Female who presents with a chief complaint of SOB, mild chest pain (08 May 2018 12:20)    feels much better. S/P perma cath today.   family at bed side.         PAST MEDICAL & SURGICAL HISTORY:  CAD (coronary artery disease)  Diabetes  CRF (chronic renal failure)  Hypertension  Pneumonia  Myocardial infarction  Hypertension  Diabetes  H/O: hysterectomy          PHYSICAL EXAM:    T(C): 36.3 (05-08-18 @ 12:39), Max: 36.7 (05-08-18 @ 04:40)  HR: 62 (05-08-18 @ 12:39) (62 - 73)  BP: 125/78 (05-08-18 @ 12:39) (116/63 - 146/67)  RR: 15 (05-08-18 @ 12:39) (15 - 21)  SpO2: 98% (05-08-18 @ 12:39) (94% - 98%)  Wt(kg): --    I&O's Detail    07 May 2018 07:01  -  08 May 2018 07:00  --------------------------------------------------------  IN:    Oral Fluid: 490 mL  Total IN: 490 mL    OUT:  Total OUT: 0 mL    Total NET: 490 mL      08 May 2018 07:01  -  08 May 2018 13:40  --------------------------------------------------------  IN:  Total IN: 0 mL    OUT:    Voided: 150 mL  Total OUT: 150 mL    Total NET: -150 mL          Respiratory: clear anteriorly, decreased BS at bases  Cardiovascular: S1 S2  Gastrointestinal: soft NT ND +BS  Extremities: edema   Neuro: Awake and alert    MEDICATIONS  (STANDING):  atorvastatin 20 milliGRAM(s) Oral at bedtime  dextrose 5%. 1000 milliLiter(s) (50 mL/Hr) IV Continuous <Continuous>  dextrose 50% Injectable 12.5 Gram(s) IV Push once  dextrose 50% Injectable 25 Gram(s) IV Push once  dextrose 50% Injectable 25 Gram(s) IV Push once  docusate sodium 100 milliGRAM(s) Oral two times a day  epoetin analilia Injectable 60665 Unit(s) SubCutaneous <User Schedule>  furosemide    Tablet 40 milliGRAM(s) Oral two times a day  gabapentin 300 milliGRAM(s) Oral two times a day  insulin glargine Injectable (LANTUS) 8 Unit(s) SubCutaneous at bedtime  insulin glargine Injectable (LANTUS) 26 Unit(s) SubCutaneous every morning  insulin lispro (HumaLOG) corrective regimen sliding scale   SubCutaneous three times a day before meals  iron sucrose Injectable 100 milliGRAM(s) IV Push daily  isosorbide   dinitrate Tablet (ISORDIL) 30 milliGRAM(s) Oral three times a day  metoprolol tartrate 50 milliGRAM(s) Oral two times a day  NIFEdipine XL 60 milliGRAM(s) Oral daily  senna 2 Tablet(s) Oral at bedtime    MEDICATIONS  (PRN):  dextrose Gel 1 Dose(s) Oral once PRN Blood Glucose LESS THAN 70 milliGRAM(s)/deciliter  glucagon  Injectable 1 milliGRAM(s) IntraMuscular once PRN Glucose LESS THAN 70 milligrams/deciliter  simethicone 80 milliGRAM(s) Chew daily PRN Heartburn                            7.3    11.1  )-----------( 232      ( 08 May 2018 06:39 )             23.7       05-08    141  |  104  |  89<H>  ----------------------------<  141<H>  4.3   |  25  |  6.00<H>    Ca    8.6      08 May 2018 06:39  Phos  5.9     05-07    TPro  x   /  Alb  2.8<L>  /  TBili  x   /  DBili  x   /  AST  x   /  ALT  x   /  AlkPhos  x   05-07

## 2018-05-08 NOTE — DIETITIAN INITIAL EVALUATION ADULT. - PROBLEM SELECTOR PLAN 6
Chronic.   - low dose ISS with accuchecks, hypoglycemic protocol  - Home lantus dose (30 AM, 10 PM) adjusted to (26 AM, 8 qhs)  - Diet: consistent carb

## 2018-05-09 LAB
ANION GAP SERPL CALC-SCNC: 6 MMOL/L — SIGNIFICANT CHANGE UP (ref 5–17)
BUN SERPL-MCNC: 43 MG/DL — HIGH (ref 7–23)
CALCIUM SERPL-MCNC: 8.6 MG/DL — SIGNIFICANT CHANGE UP (ref 8.5–10.1)
CHLORIDE SERPL-SCNC: 99 MMOL/L — SIGNIFICANT CHANGE UP (ref 96–108)
CO2 SERPL-SCNC: 31 MMOL/L — SIGNIFICANT CHANGE UP (ref 22–31)
CREAT SERPL-MCNC: 4.1 MG/DL — HIGH (ref 0.5–1.3)
GLUCOSE SERPL-MCNC: 185 MG/DL — HIGH (ref 70–99)
HCT VFR BLD CALC: 24.4 % — LOW (ref 34.5–45)
HGB BLD-MCNC: 7.6 G/DL — LOW (ref 11.5–15.5)
MCHC RBC-ENTMCNC: 26.6 PG — LOW (ref 27–34)
MCHC RBC-ENTMCNC: 31.2 GM/DL — LOW (ref 32–36)
MCV RBC AUTO: 85.2 FL — SIGNIFICANT CHANGE UP (ref 80–100)
PLATELET # BLD AUTO: 250 K/UL — SIGNIFICANT CHANGE UP (ref 150–400)
POTASSIUM SERPL-MCNC: 4.4 MMOL/L — SIGNIFICANT CHANGE UP (ref 3.5–5.3)
POTASSIUM SERPL-SCNC: 4.4 MMOL/L — SIGNIFICANT CHANGE UP (ref 3.5–5.3)
RBC # BLD: 2.86 M/UL — LOW (ref 3.8–5.2)
RBC # FLD: 15.3 % — HIGH (ref 10.3–14.5)
SODIUM SERPL-SCNC: 136 MMOL/L — SIGNIFICANT CHANGE UP (ref 135–145)
WBC # BLD: 11.2 K/UL — HIGH (ref 3.8–10.5)
WBC # FLD AUTO: 11.2 K/UL — HIGH (ref 3.8–10.5)

## 2018-05-09 PROCEDURE — 99233 SBSQ HOSP IP/OBS HIGH 50: CPT | Mod: GC

## 2018-05-09 RX ORDER — ERYTHROPOIETIN 10000 [IU]/ML
10000 INJECTION, SOLUTION INTRAVENOUS; SUBCUTANEOUS
Qty: 0 | Refills: 0 | Status: DISCONTINUED | OUTPATIENT
Start: 2018-05-09 | End: 2018-05-14

## 2018-05-09 RX ORDER — ISOSORBIDE DINITRATE 5 MG/1
30 TABLET ORAL
Qty: 0 | Refills: 0 | Status: DISCONTINUED | OUTPATIENT
Start: 2018-05-10 | End: 2018-05-11

## 2018-05-09 RX ADMIN — IRON SUCROSE 100 MILLIGRAM(S): 20 INJECTION, SOLUTION INTRAVENOUS at 12:46

## 2018-05-09 RX ADMIN — INSULIN GLARGINE 8 UNIT(S): 100 INJECTION, SOLUTION SUBCUTANEOUS at 22:13

## 2018-05-09 RX ADMIN — Medication 1: at 08:47

## 2018-05-09 RX ADMIN — Medication 50 MILLIGRAM(S): at 05:12

## 2018-05-09 RX ADMIN — GABAPENTIN 300 MILLIGRAM(S): 400 CAPSULE ORAL at 22:12

## 2018-05-09 RX ADMIN — ATORVASTATIN CALCIUM 20 MILLIGRAM(S): 80 TABLET, FILM COATED ORAL at 22:13

## 2018-05-09 RX ADMIN — ERYTHROPOIETIN 10000 UNIT(S): 10000 INJECTION, SOLUTION INTRAVENOUS; SUBCUTANEOUS at 17:50

## 2018-05-09 RX ADMIN — Medication 50 MILLIGRAM(S): at 22:13

## 2018-05-09 RX ADMIN — Medication 60 MILLIGRAM(S): at 05:12

## 2018-05-09 RX ADMIN — Medication 81 MILLIGRAM(S): at 12:46

## 2018-05-09 RX ADMIN — Medication 100 MILLIGRAM(S): at 05:12

## 2018-05-09 RX ADMIN — Medication 40 MILLIGRAM(S): at 05:12

## 2018-05-09 RX ADMIN — GABAPENTIN 300 MILLIGRAM(S): 400 CAPSULE ORAL at 05:13

## 2018-05-09 RX ADMIN — ISOSORBIDE DINITRATE 30 MILLIGRAM(S): 5 TABLET ORAL at 05:13

## 2018-05-09 RX ADMIN — INSULIN GLARGINE 26 UNIT(S): 100 INJECTION, SOLUTION SUBCUTANEOUS at 08:47

## 2018-05-09 NOTE — PROGRESS NOTE ADULT - ASSESSMENT
·	ESRD on HD  ·	CHF  ·	Anemia  ·	Hyperkalemia  ·	Diabetes  ·	Hypertension    2nd treatment today. Will d/c PO diuretics. Doen titrate BP meds. To optimise BP meds.  Monitor h/h trend. On IV iron and Epogen. K levels better.   Monitor blood sugar levels. Insulin coverage as needed. Dietary restriction.   Monitor BP trend. Titrate BP meds as needed. Salt restriction.    for out pt dialysis arrangements.

## 2018-05-09 NOTE — PROGRESS NOTE ADULT - PROBLEM SELECTOR PLAN 1
jerri on CKD  Patient had right IJ cath this AM  HD today  As per cardio, Aspirin and clopidogrel held for permcath placement on Friday, ASA to be resumed after procedure  If there is no plan for AVF this admission, can resume clopidogrel as well  s/p kayexalate and Albuterol for hypokalemia, repeat k wnl   Dr. Albarran (Renal) on board

## 2018-05-09 NOTE — PROGRESS NOTE ADULT - PROBLEM SELECTOR PLAN 2
Likely anemia of chronic disease given CKD.    iron studies show low TIBC, high Ferritin  f/u FOBT    [facility] medical student will get out pt lab result

## 2018-05-09 NOTE — PROGRESS NOTE ADULT - ATTENDING COMMENTS
felicia on friday, had already temporary cath today, start HD today. per DR anderson no need for vascular consult and AVF for now, he will arrange it as outpatient.

## 2018-05-09 NOTE — PROGRESS NOTE ADULT - ASSESSMENT
68F PMHx CAD (3 stents 2007), DM type 2 on insulin, Peripheral neuropathy, HTN transferred from [place] ED for hemodialysis due to electrolyte abnormalities, shortness of breath sec to acute on chr diastolic chf , chest pain. Patient went for a right IJ permacath this AM, in preparation for HD.

## 2018-05-09 NOTE — PROGRESS NOTE ADULT - SUBJECTIVE AND OBJECTIVE BOX
Patient is a 68y old  Female who presents with a chief complaint of SOB, mild chest pain (04 May 2018 12:48)      Patient seen in follow up for CKD 5, fluid overload. Started on HD. 2nd treatment today.     PAST MEDICAL HISTORY:  CAD (coronary artery disease)  Diabetes  CRF (chronic renal failure)  Hypertension  Pneumonia  Myocardial infarction  Hypertension  Diabetes    MEDICATIONS  (STANDING):  aspirin enteric coated 81 milliGRAM(s) Oral daily  atorvastatin 20 milliGRAM(s) Oral at bedtime  dextrose 5%. 1000 milliLiter(s) (50 mL/Hr) IV Continuous <Continuous>  dextrose 50% Injectable 12.5 Gram(s) IV Push once  dextrose 50% Injectable 25 Gram(s) IV Push once  dextrose 50% Injectable 25 Gram(s) IV Push once  docusate sodium 100 milliGRAM(s) Oral two times a day  epoetin analilia Injectable 72161 Unit(s) IV Push <User Schedule>  gabapentin 300 milliGRAM(s) Oral two times a day  insulin glargine Injectable (LANTUS) 8 Unit(s) SubCutaneous at bedtime  insulin glargine Injectable (LANTUS) 26 Unit(s) SubCutaneous every morning  insulin lispro (HumaLOG) corrective regimen sliding scale   SubCutaneous three times a day before meals  metoprolol tartrate 50 milliGRAM(s) Oral two times a day  senna 2 Tablet(s) Oral at bedtime    MEDICATIONS  (PRN):  dextrose Gel 1 Dose(s) Oral once PRN Blood Glucose LESS THAN 70 milliGRAM(s)/deciliter  glucagon  Injectable 1 milliGRAM(s) IntraMuscular once PRN Glucose LESS THAN 70 milligrams/deciliter  simethicone 80 milliGRAM(s) Chew daily PRN Heartburn    T(C): 36.8 (05-09-18 @ 11:45), Max: 37.3 (05-09-18 @ 07:20)  HR: 67 (05-09-18 @ 11:45) (62 - 78)  BP: 131/74 (05-09-18 @ 11:45) (103/53 - 156/67)  RR: 17 (05-09-18 @ 11:45)  SpO2: 94% (05-09-18 @ 11:45)  Wt(kg): --  I&O's Detail    08 May 2018 07:01  -  09 May 2018 07:00  --------------------------------------------------------  IN:    Oral Fluid: 200 mL    Other: 800 mL  Total IN: 1000 mL    OUT:    Other: 1300 mL    Voided: 152 mL  Total OUT: 1452 mL    Total NET: -452 mL      09 May 2018 07:01  -  09 May 2018 14:13  --------------------------------------------------------  IN:    Oral Fluid: 240 mL  Total IN: 240 mL    OUT:  Total OUT: 0 mL    Total NET: 240 mL          PHYSICAL EXAM:  General: NAD  Respiratory: b/l air entry  Cardiovascular: S1 S2  Gastrointestinal: soft  Extremities:  + edema                LABORATORY:                        7.6    11.2  )-----------( 250      ( 09 May 2018 07:06 )             24.4     05-09    136  |  99  |  43<H>  ----------------------------<  185<H>  4.4   |  31  |  4.10<H>    Ca    8.6      09 May 2018 07:06      Sodium, Serum: 136 mmol/L (05-09 @ 07:06)  Sodium, Serum: 141 mmol/L (05-08 @ 06:39)    Potassium, Serum: 4.4 mmol/L (05-09 @ 07:06)  Potassium, Serum: 4.3 mmol/L (05-08 @ 06:39)    Hemoglobin: 7.6 g/dL (05-09 @ 07:06)  Hemoglobin: 7.3 g/dL (05-08 @ 06:39)  Hemoglobin: 7.3 g/dL (05-07 @ 05:48)    Creatinine, Serum 4.10 (05-09 @ 07:06)  Creatinine, Serum 6.00 (05-08 @ 06:39)  Creatinine, Serum 5.70 (05-07 @ 05:48)

## 2018-05-09 NOTE — PROGRESS NOTE ADULT - SUBJECTIVE AND OBJECTIVE BOX
Chief Complaint: Chest pain, shortness of breath    Interval Events: No events overnight.    Review of Systems:  General: No fevers, chills, weight loss or gain  Skin: No rashes, color changes  Cardiovascular: No chest pain, orthopnea  Respiratory: No shortness of breath, cough  Gastrointestinal: No nausea, abdominal pain  Genitourinary: No incontinence, pain with urination  Musculoskeletal: No pain, swelling, decreased range of motion  Neurological: No headache, weakness  Psychiatric: No depression, anxiety  Endocrine: No weight loss or gain, increased thirst  All other systems are comprehensively negative.    Physical Exam:  Vital Signs Last 24 Hrs  T(C): 37.3 (09 May 2018 07:20), Max: 37.3 (09 May 2018 07:20)  T(F): 99.1 (09 May 2018 07:20), Max: 99.1 (09 May 2018 07:20)  HR: 67 (09 May 2018 07:20) (62 - 78)  BP: 122/56 (09 May 2018 07:20) (103/53 - 156/67)  BP(mean): --  RR: 20 (09 May 2018 07:20) (15 - 20)  SpO2: 97% (09 May 2018 07:20) (95% - 99%)  General: NAD  HEENT: MMM  Neck: No JVD, no carotid bruit  Lungs: CTAB  CV: RRR, nl S1/S2, no M/R/G  Abdomen: S/NT/ND, +BS  Extremities: No LE edema, no cyanosis  Neuro: AAOx3, non-focal  Skin: No rash    Labs:             05-09    136  |  99  |  43<H>  ----------------------------<  185<H>  4.4   |  31  |  4.10<H>    Ca    8.6      09 May 2018 07:06                          7.6    11.2  )-----------( 250      ( 09 May 2018 07:06 )             24.4       Telemetry: Sinus rhythm

## 2018-05-09 NOTE — PROGRESS NOTE ADULT - PROBLEM SELECTOR PLAN 4
like [facility] as per cardio  Dr. Mena (Cardio)   high troponin increase demand ischemia sec to chf, trending down, ACS ruled out.   likely enzyme in high conc 2/2 renal failure  pt have hx cad 2 stent 2007 on asa/ Plavix statin

## 2018-05-09 NOTE — PROGRESS NOTE ADULT - SUBJECTIVE AND OBJECTIVE BOX
Patient is a 68y old  Female who presents with a chief complaint of SOB, mild chest pain (08 May 2018 12:20)      INTERVAL HPI/OVERNIGHT EVENTS: 68F PMHx CAD (3 stents 2007), DM type 2 on insulin, Peripheral neuropathy, HTN transferred from [place] ED for hemodialysis due to electrolyte abnormalities, shortness of breath sec to acute on chr diastolic chf , chest pain. Patient went for a right IJ  temporary cath this AM, in preparation for HD. pt feels better.     MEDICATIONS  (STANDING):  aspirin enteric coated 81 milliGRAM(s) Oral daily  atorvastatin 20 milliGRAM(s) Oral at bedtime  dextrose 5%. 1000 milliLiter(s) (50 mL/Hr) IV Continuous <Continuous>  dextrose 50% Injectable 12.5 Gram(s) IV Push once  dextrose 50% Injectable 25 Gram(s) IV Push once  dextrose 50% Injectable 25 Gram(s) IV Push once  docusate sodium 100 milliGRAM(s) Oral two times a day  epoetin analilia Injectable 20241 Unit(s) IV Push <User Schedule>  gabapentin 300 milliGRAM(s) Oral two times a day  insulin glargine Injectable (LANTUS) 8 Unit(s) SubCutaneous at bedtime  insulin glargine Injectable (LANTUS) 26 Unit(s) SubCutaneous every morning  insulin lispro (HumaLOG) corrective regimen sliding scale   SubCutaneous three times a day before meals  metoprolol tartrate 50 milliGRAM(s) Oral two times a day  senna 2 Tablet(s) Oral at bedtime    MEDICATIONS  (PRN):  dextrose Gel 1 Dose(s) Oral once PRN Blood Glucose LESS THAN 70 milliGRAM(s)/deciliter  glucagon  Injectable 1 milliGRAM(s) IntraMuscular once PRN Glucose LESS THAN 70 milligrams/deciliter  simethicone 80 milliGRAM(s) Chew daily PRN Heartburn      Allergies    No Known Allergies    Intolerances        REVIEW OF SYSTEMS:  CONSTITUTIONAL: No fever, weight loss, or fatigue  EYES: No eye pain, visual disturbances, or discharge  ENMT:  No difficulty hearing, tinnitus, vertigo; No sinus or throat pain  NECK: No pain or stiffness  BREASTS: No pain, masses, or nipple discharge  RESPIRATORY: No cough, wheezing, chills or hemoptysis; No shortness of breath  CARDIOVASCULAR: No chest pain, palpitations, dizziness, or leg swelling  GASTROINTESTINAL: No abdominal or epigastric pain. No nausea, vomiting, or hematemesis; No diarrhea or constipation. No melena or hematochezia.  GENITOURINARY: No dysuria, frequency, hematuria, or incontinence  NEUROLOGICAL: No headaches, memory loss, loss of strength, numbness, or tremors  SKIN: No itching, burning, rashes, or lesions   LYMPH NODES: No enlarged glands  ENDOCRINE: No heat or cold intolerance; No hair loss; No polydipsia or polyuria  MUSCULOSKELETAL: No joint pain or swelling; No muscle, back, or extremity pain  PSYCHIATRIC: No depression, anxiety, mood swings, or difficulty sleeping  HEME/LYMPH: No easy bruising, or bleeding gums  ALLERGY AND IMMUNOLOGIC: No hives or eczema    Vital Signs Last 24 Hrs  T(C): 36.8 (09 May 2018 11:45), Max: 37.3 (09 May 2018 07:20)  T(F): 98.3 (09 May 2018 11:45), Max: 99.1 (09 May 2018 07:20)  HR: 67 (09 May 2018 11:45) (67 - 78)  BP: 131/74 (09 May 2018 11:45) (116/70 - 156/67)  BP(mean): --  RR: 17 (09 May 2018 11:45) (17 - 20)  SpO2: 94% (09 May 2018 11:45) (94% - 99%)    PHYSICAL EXAM:  GENERAL: NAD, well-groomed, well-developed  HEAD:  Atraumatic, Normocephalic  EYES: EOMI, PERRLA, conjunctiva and sclera clear  ENMT: No tonsillar erythema, exudates, or enlargement; Moist mucous membranes, Good dentition, No lesions  NECK: Supple, No JVD, Normal thyroid  NERVOUS SYSTEM:  Alert & Oriented X3, Good concentration; Motor Strength 5/5 B/L upper and lower extremities; DTRs 2+ intact and symmetric  CHEST/LUNG: Clear to auscultation bilaterally; No rales, rhonchi, wheezing, or rubs  HEART: Regular rate and rhythm; No murmurs, rubs, or gallops  ABDOMEN: Soft, Nontender, Nondistended; Bowel sounds present  EXTREMITIES:  2+ Peripheral Pulses, No clubbing, cyanosis, or edema  LYMPH: No lymphadenopathy noted  SKIN: No rashes or lesions    LABS:                        7.6    11.2  )-----------( 250      ( 09 May 2018 07:06 )             24.4     09 May 2018 07:06    136    |  99     |  43     ----------------------------<  185    4.4     |  31     |  4.10     Ca    8.6        09 May 2018 07:06      PT/INR - ( 08 May 2018 06:39 )   PT: 14.2 sec;   INR: 1.30 ratio             CAPILLARY BLOOD GLUCOSE      POCT Blood Glucose.: 108 mg/dL (09 May 2018 11:34)  POCT Blood Glucose.: 180 mg/dL (09 May 2018 07:49)  POCT Blood Glucose.: 167 mg/dL (08 May 2018 21:10)  POCT Blood Glucose.: 150 mg/dL (08 May 2018 17:08)      RADIOLOGY & ADDITIONAL TESTS:    Imaging Personally Reviewed:  [x ] YES  [ ] NO  < from: Xray Chest 1 View- PORTABLE-Urgent (05.03.18 @ 17:49) >   Increased pulmonary vascular congestion noted. No gross   consolidation is seen.               < end of copied text >    Consultant(s) Notes Reviewed:  [ x] YES  [ ] NO    Care Discussed with Consultants/Other Providers [x ] YES  [ ] NO

## 2018-05-10 LAB
ANION GAP SERPL CALC-SCNC: 7 MMOL/L — SIGNIFICANT CHANGE UP (ref 5–17)
BUN SERPL-MCNC: 18 MG/DL — SIGNIFICANT CHANGE UP (ref 7–23)
CALCIUM SERPL-MCNC: 8.7 MG/DL — SIGNIFICANT CHANGE UP (ref 8.5–10.1)
CHLORIDE SERPL-SCNC: 100 MMOL/L — SIGNIFICANT CHANGE UP (ref 96–108)
CO2 SERPL-SCNC: 32 MMOL/L — HIGH (ref 22–31)
CREAT SERPL-MCNC: 2.8 MG/DL — HIGH (ref 0.5–1.3)
GLUCOSE SERPL-MCNC: 131 MG/DL — HIGH (ref 70–99)
HCT VFR BLD CALC: 26.5 % — LOW (ref 34.5–45)
HGB BLD-MCNC: 8.1 G/DL — LOW (ref 11.5–15.5)
MCHC RBC-ENTMCNC: 26.4 PG — LOW (ref 27–34)
MCHC RBC-ENTMCNC: 30.7 GM/DL — LOW (ref 32–36)
MCV RBC AUTO: 86.1 FL — SIGNIFICANT CHANGE UP (ref 80–100)
PLATELET # BLD AUTO: 251 K/UL — SIGNIFICANT CHANGE UP (ref 150–400)
POTASSIUM SERPL-MCNC: 3.6 MMOL/L — SIGNIFICANT CHANGE UP (ref 3.5–5.3)
POTASSIUM SERPL-SCNC: 3.6 MMOL/L — SIGNIFICANT CHANGE UP (ref 3.5–5.3)
RBC # BLD: 3.08 M/UL — LOW (ref 3.8–5.2)
RBC # FLD: 15.6 % — HIGH (ref 10.3–14.5)
SODIUM SERPL-SCNC: 139 MMOL/L — SIGNIFICANT CHANGE UP (ref 135–145)
WBC # BLD: 10.9 K/UL — HIGH (ref 3.8–10.5)
WBC # FLD AUTO: 10.9 K/UL — HIGH (ref 3.8–10.5)

## 2018-05-10 PROCEDURE — 99233 SBSQ HOSP IP/OBS HIGH 50: CPT | Mod: GC

## 2018-05-10 RX ORDER — ONDANSETRON 8 MG/1
4 TABLET, FILM COATED ORAL ONCE
Qty: 0 | Refills: 0 | Status: COMPLETED | OUTPATIENT
Start: 2018-05-10 | End: 2018-05-10

## 2018-05-10 RX ADMIN — Medication 100 MILLIGRAM(S): at 18:08

## 2018-05-10 RX ADMIN — ISOSORBIDE DINITRATE 30 MILLIGRAM(S): 5 TABLET ORAL at 06:40

## 2018-05-10 RX ADMIN — GABAPENTIN 300 MILLIGRAM(S): 400 CAPSULE ORAL at 06:41

## 2018-05-10 RX ADMIN — Medication 81 MILLIGRAM(S): at 12:52

## 2018-05-10 RX ADMIN — Medication 2: at 12:51

## 2018-05-10 RX ADMIN — ONDANSETRON 4 MILLIGRAM(S): 8 TABLET, FILM COATED ORAL at 08:21

## 2018-05-10 RX ADMIN — ATORVASTATIN CALCIUM 20 MILLIGRAM(S): 80 TABLET, FILM COATED ORAL at 21:23

## 2018-05-10 RX ADMIN — Medication 50 MILLIGRAM(S): at 06:40

## 2018-05-10 RX ADMIN — Medication 50 MILLIGRAM(S): at 18:01

## 2018-05-10 RX ADMIN — Medication 1: at 08:22

## 2018-05-10 RX ADMIN — ISOSORBIDE DINITRATE 30 MILLIGRAM(S): 5 TABLET ORAL at 18:01

## 2018-05-10 RX ADMIN — Medication 1: at 18:02

## 2018-05-10 RX ADMIN — INSULIN GLARGINE 26 UNIT(S): 100 INJECTION, SOLUTION SUBCUTANEOUS at 09:04

## 2018-05-10 RX ADMIN — GABAPENTIN 300 MILLIGRAM(S): 400 CAPSULE ORAL at 18:07

## 2018-05-10 RX ADMIN — INSULIN GLARGINE 8 UNIT(S): 100 INJECTION, SOLUTION SUBCUTANEOUS at 21:22

## 2018-05-10 NOTE — PROGRESS NOTE ADULT - SUBJECTIVE AND OBJECTIVE BOX
INTERVAL HPI/OVERNIGHT EVENTS:    68F PMHx CAD (3 stents 2007), DM type 2 on insulin, Peripheral neuropathy, HTN transferred from [place] ED for hemodialysis due to electrolyte abnormalities, shortness of breath sec to acute on chr diastolic chf , chest pain. Patient had right IJ  temporary cath placed 5/8, s/p HD x 2.     No events overnight. Patient seen and examined at bedside this AM. Patient c/o HA and right neck pain. Plan for permacath tomorrow.       MEDICATIONS  (STANDING):  aspirin enteric coated 81 milliGRAM(s) Oral daily  atorvastatin 20 milliGRAM(s) Oral at bedtime  dextrose 5%. 1000 milliLiter(s) (50 mL/Hr) IV Continuous <Continuous>  dextrose 50% Injectable 12.5 Gram(s) IV Push once  dextrose 50% Injectable 25 Gram(s) IV Push once  dextrose 50% Injectable 25 Gram(s) IV Push once  docusate sodium 100 milliGRAM(s) Oral two times a day  epoetin analilia Injectable 53175 Unit(s) IV Push <User Schedule>  gabapentin 300 milliGRAM(s) Oral two times a day  insulin glargine Injectable (LANTUS) 8 Unit(s) SubCutaneous at bedtime  insulin glargine Injectable (LANTUS) 26 Unit(s) SubCutaneous every morning  insulin lispro (HumaLOG) corrective regimen sliding scale   SubCutaneous three times a day before meals  isosorbide   dinitrate Tablet (ISORDIL) 30 milliGRAM(s) Oral two times a day  metoprolol tartrate 50 milliGRAM(s) Oral two times a day  senna 2 Tablet(s) Oral at bedtime    MEDICATIONS  (PRN):  dextrose Gel 1 Dose(s) Oral once PRN Blood Glucose LESS THAN 70 milliGRAM(s)/deciliter  glucagon  Injectable 1 milliGRAM(s) IntraMuscular once PRN Glucose LESS THAN 70 milligrams/deciliter  simethicone 80 milliGRAM(s) Chew daily PRN Heartburn      Allergies    No Known Allergies    Intolerances        CONSTITUTIONAL: No weakness, fevers or chills  EYES/ENT: No visual changes;  No vertigo or throat pain   NECK: No pain or stiffness  RESPIRATORY: No cough, wheezing, hemoptysis; No shortness of breath  CARDIOVASCULAR: No chest pain or palpitations  GASTROINTESTINAL: No abdominal or epigastric pain. No nausea, vomiting, or hematemesis; No diarrhea or constipation. No melena or hematochezia.  GENITOURINARY: No dysuria, frequency or hematuria  NEUROLOGICAL: No numbness or weakness  SKIN: No itching, burning, rashes, or lesions   All other review of systems is negative unless indicated above.    Vital Signs Last 24 Hrs  T(C): 36.5 (10 May 2018 08:13), Max: 37.1 (09 May 2018 21:00)  T(F): 97.7 (10 May 2018 08:13), Max: 98.7 (09 May 2018 21:00)  HR: 72 (10 May 2018 11:41) (67 - 77)  BP: 124/55 (10 May 2018 11:41) (108/68 - 169/68)  BP(mean): --  RR: 16 (10 May 2018 11:41) (15 - 18)  SpO2: 95% (10 May 2018 11:41) (92% - 96%)    05-09 @ 07:01  -  05-10 @ 07:00  --------------------------------------------------------  IN: 340 mL / OUT: 500 mL / NET: -160 mL        PHYSICAL EXAM:  GENERAL: NAD, well-groomed, well-developed  HEAD:  Atraumatic, Normocephalic  EYES: EOMI, PERRLA, conjunctiva and sclera clear  ENMT: No tonsillar erythema, exudates, or enlargement; Moist mucous membranes, Good dentition, No lesions  NECK: Supple, No JVD, Normal thyroid  NERVOUS SYSTEM:  Alert & Oriented X3, Good concentration; Motor Strength 5/5 B/L upper and lower extremities; DTRs 2+ intact and symmetric  CHEST/LUNG: Clear to auscultation bilaterally; No rales, rhonchi, wheezing, or rubs  HEART: Regular rate and rhythm; No murmurs, rubs, or gallops  ABDOMEN: Soft, Nontender, Nondistended; Bowel sounds present  EXTREMITIES:  2+ Peripheral Pulses, No clubbing, cyanosis, or edema  LYMPH: No lymphadenopathy noted  SKIN: No rashes or lesions    LABS:                        8.1    10.9  )-----------( 251      ( 10 May 2018 07:12 )             26.5     05-10    139  |  100  |  18  ----------------------------<  131<H>  3.6   |  32<H>  |  2.80<H>    Ca    8.7      10 May 2018 07:12            RADIOLOGY & ADDITIONAL TESTS:      < from: US Guided Vascular Access (05.08.18 @ 12:14) >    EXAM:  IR PROCEDURE NON PICC                          EXAM:  US GUIDANCE VASCULAR ACCESS                            PROCEDURE DATE:  05/08/2018          INTERPRETATION:    Temporary dialysis catheter placement:    Clinical History:    Acute renal insufficiency.    Technique:    An informed consent obtained. Risks and benefits explained. Sedation   provided by anesthesiologist.    Patient placed supine on the angiographic table. Time out procedure was   performed. Limited ultrasonography demonstrated patent right internal   jugular vein.   The right neck prepped and draped in sterile fashion. 1% lidocaine   utilized for local anesthesia. Under ultrasound guidance, the   rightinternal jugular vein accessed using micropuncture needle. Hard copy   ultrasound image obtained. Under fluoroscopy, needle exchanged to a   microcatheter over the microwire. Through the microcatheter, a J   guidewire placed into the superior vena cava. Over the J guidewire,   access tract dilated with a 8 and 12 Yemeni dilator. Subsequently, 14.5   Y[place]i dual-lumen temporary dialysis catheter placed under fluoroscopy   with the tip in distal superior vena cava. Hemostasis secured by manual   compression. The catheter anchored to the skin using suture. The site   dressed in sterile fashion. Both ports flushed with normal saline. Both   the ports filled with 5000 units heparin fluid. Patient tolerated the   procedure well. Patient transferred to floor for further observation and   management in stable condition.    Impression:    Successful ultrasound and fluoroscopy guided right internal jugular vein   temporary nontunneled dialysis catheter placement. Catheter is ready for   hemodialysis.    < end of copied text >    < from: Xray Chest 1 View- PORTABLE-Urgent (05.03.18 @ 17:49) >    EXAM:  XR CHEST PORTABLE URGENT 1V                                  PROCEDURE DATE:  05/03/2018          INTERPRETATION:  Chest radiograph (one view)     CPT 32047    CLINICAL INFORMATION: Shortness of breath of unknown severity since today.    TECHNIQUE:  Single frontal view of the chest was obtained.    FINDINGS:  No previous examinations are available for review.    The lungs demonstrate increased pulmonary vascular congestion. No pleural   effusion is seen. The heart and mediastinum appear intact.           IMPRESSION: Increased pulmonary vascular congestion noted. No gross   consolidation is seen.                           < end of copied text >

## 2018-05-10 NOTE — PROGRESS NOTE ADULT - SUBJECTIVE AND OBJECTIVE BOX
Patient is a 68y old  Female who presents with a chief complaint of SOB, mild chest pain (04 May 2018 12:48)      Patient seen in follow up for CKD 5, fluid overload. Started on HD. 3rd HD tomorrow.     PAST MEDICAL HISTORY:  CAD (coronary artery disease)  Diabetes  CRF (chronic renal failure)  Hypertension  Pneumonia  Myocardial infarction  Hypertension  Diabetes    MEDICATIONS  (STANDING):  aspirin enteric coated 81 milliGRAM(s) Oral daily  atorvastatin 20 milliGRAM(s) Oral at bedtime  dextrose 5%. 1000 milliLiter(s) (50 mL/Hr) IV Continuous <Continuous>  dextrose 50% Injectable 12.5 Gram(s) IV Push once  dextrose 50% Injectable 25 Gram(s) IV Push once  dextrose 50% Injectable 25 Gram(s) IV Push once  docusate sodium 100 milliGRAM(s) Oral two times a day  epoetin analilia Injectable 27542 Unit(s) IV Push <User Schedule>  gabapentin 300 milliGRAM(s) Oral two times a day  insulin glargine Injectable (LANTUS) 8 Unit(s) SubCutaneous at bedtime  insulin glargine Injectable (LANTUS) 26 Unit(s) SubCutaneous every morning  insulin lispro (HumaLOG) corrective regimen sliding scale   SubCutaneous three times a day before meals  isosorbide   dinitrate Tablet (ISORDIL) 30 milliGRAM(s) Oral two times a day  metoprolol tartrate 50 milliGRAM(s) Oral two times a day  senna 2 Tablet(s) Oral at bedtime    MEDICATIONS  (PRN):  dextrose Gel 1 Dose(s) Oral once PRN Blood Glucose LESS THAN 70 milliGRAM(s)/deciliter  glucagon  Injectable 1 milliGRAM(s) IntraMuscular once PRN Glucose LESS THAN 70 milligrams/deciliter  simethicone 80 milliGRAM(s) Chew daily PRN Heartburn    T(C): 36.5 (05-10-18 @ 08:13), Max: 37.3 (05-09-18 @ 07:20)  HR: 72 (05-10-18 @ 11:41) (67 - 78)  BP: 124/55 (05-10-18 @ 11:41) (103/53 - 169/68)  RR: 16 (05-10-18 @ 11:41)  SpO2: 95% (05-10-18 @ 11:41)  Wt(kg): --  I&O's Detail    09 May 2018 07:01  -  10 May 2018 07:00  --------------------------------------------------------  IN:    Oral Fluid: 340 mL  Total IN: 340 mL    OUT:    Other: 500 mL  Total OUT: 500 mL    Total NET: -160 mL              PHYSICAL EXAM:  General: NAD  Respiratory: b/l air entry  Cardiovascular: S1 S2  Gastrointestinal: soft  Extremities:  + edema                LABORATORY:                        8.1    10.9  )-----------( 251      ( 10 May 2018 07:12 )             26.5     05-10    139  |  100  |  18  ----------------------------<  131<H>  3.6   |  32<H>  |  2.80<H>    Ca    8.7      10 May 2018 07:12      Sodium, Serum: 139 mmol/L (05-10 @ 07:12)  Sodium, Serum: 136 mmol/L (05-09 @ 07:06)    Potassium, Serum: 3.6 mmol/L (05-10 @ 07:12)  Potassium, Serum: 4.4 mmol/L (05-09 @ 07:06)    Hemoglobin: 8.1 g/dL (05-10 @ 07:12)  Hemoglobin: 7.6 g/dL (05-09 @ 07:06)  Hemoglobin: 7.3 g/dL (05-08 @ 06:39)    Creatinine, Serum 2.80 (05-10 @ 07:12)  Creatinine, Serum 4.10 (05-09 @ 07:06)  Creatinine, Serum 6.00 (05-08 @ 06:39)

## 2018-05-10 NOTE — PROGRESS NOTE ADULT - PROBLEM SELECTOR PLAN 1
Improving  jerri on CKD  Patient had right IJ  temporary cath placed 5/8, s/p HD x 2.   Permcath tomorrow  As per cardio, Aspirin and clopidogrel held for permcath placement  s/p kayexalate and Albuterol for hypokalemia  Dr. Albarran (Renal) on board

## 2018-05-10 NOTE — PROGRESS NOTE ADULT - PROBLEM SELECTOR PLAN 6
- type2   - hgb a1c 7.9  - low dose ISS with accuchecks, hypoglycemic protocol  - Home lantus dose (30 AM, 10 PM) adjusted to (26 AM, 8 qhs)  - Will monitor to see if need tighter coverage  - Diet: consistent carb

## 2018-05-10 NOTE — PROGRESS NOTE ADULT - ASSESSMENT
The patient is a 68 year old female with a history of HTN, DM, CAD s/p PCI in 2007, ESRD not on HD who presents with chest pain and shortness of breath.    Plan:  - Chest pain likely musculoskeletal in etiology given reproducibility of pain. Currently pain free.  - Troponin mildly elevated at 0.159 and trended down. Likely due to acute diastolic heart failure and retention of enzymes from ESRD. No need to trend further.  - Echocardiogram with low normal LV systolic function, no significant valve issues.  - Continue losartan, nifedipine, metoprolol, isordil for HTN  - Continue aspirin 81 mg daily  - Renal follow-up. HD as per renal. Off of PO diuretics.

## 2018-05-10 NOTE — PROGRESS NOTE ADULT - ASSESSMENT
68F PMHx CAD (3 stents 2007), DM type 2 on insulin, Peripheral neuropathy, HTN transferred from [place] ED for hemodialysis due to electrolyte abnormalities, shortness of breath sec to acute on chr diastolic chf , chest pain.Patient had right IJ  temporary cath placed 5/8, s/p HD x 2. Plan for permacath tomorrow.

## 2018-05-10 NOTE — PROGRESS NOTE ADULT - ASSESSMENT
·	ESRD on HD  ·	CHF  ·	Anemia  ·	Hyperkalemia  ·	Diabetes  ·	Hypertension    3rd treatment tomorrow. Monitor h/h trend. On IV iron and Epogen. K levels better.   Monitor blood sugar levels. Insulin coverage as needed. Dietary restriction.   Monitor BP trend. Titrate BP meds as needed. Salt restriction.    for out pt dialysis arrangements.

## 2018-05-10 NOTE — PROGRESS NOTE ADULT - PROBLEM SELECTOR PLAN 2
Improving  Likely anemia of chronic disease given CKD.   iron studies show low TIBC, high Ferritin  f/u FOBT   [facility] medical student will get out pt lab result

## 2018-05-10 NOTE — PROGRESS NOTE ADULT - SUBJECTIVE AND OBJECTIVE BOX
Chief Complaint: Chest pain, shortness of breath    Interval Events: No events overnight.    Review of Systems:  General: No fevers, chills, weight loss or gain  Skin: No rashes, color changes  Cardiovascular: No chest pain, orthopnea  Respiratory: No shortness of breath, cough  Gastrointestinal: No nausea, abdominal pain  Genitourinary: No incontinence, pain with urination  Musculoskeletal: No pain, swelling, decreased range of motion  Neurological: No headache, weakness  Psychiatric: No depression, anxiety  Endocrine: No weight loss or gain, increased thirst  All other systems are comprehensively negative.    Physical Exam:  Vital Signs Last 24 Hrs  T(C): 36.5 (10 May 2018 08:13), Max: 37.1 (09 May 2018 21:00)  T(F): 97.7 (10 May 2018 08:13), Max: 98.7 (09 May 2018 21:00)  HR: 67 (10 May 2018 08:13) (67 - 77)  BP: 108/68 (10 May 2018 08:13) (108/68 - 169/68)  BP(mean): --  RR: 15 (10 May 2018 08:13) (15 - 18)  SpO2: 96% (10 May 2018 08:13) (92% - 96%)  General: NAD  HEENT: MMM  Neck: No JVD, no carotid bruit  Lungs: CTAB  CV: RRR, nl S1/S2, no M/R/G  Abdomen: S/NT/ND, +BS  Extremities: No LE edema, no cyanosis  Neuro: AAOx3, non-focal  Skin: No rash    Labs:             05-10    139  |  100  |  18  ----------------------------<  131<H>  3.6   |  32<H>  |  2.80<H>    Ca    8.7      10 May 2018 07:12                          8.1    10.9  )-----------( 251      ( 10 May 2018 07:12 )             26.5       Telemetry: Sinus rhythm

## 2018-05-10 NOTE — PROGRESS NOTE ADULT - PROBLEM SELECTOR PLAN 7
- hx MI 2007 s/p stents   - No acute EKG change  - restart ASA after procedure  - c/w statin   - Cardio (Dr. Mosher) consulted.

## 2018-05-10 NOTE — PROGRESS NOTE ADULT - PROBLEM SELECTOR PLAN 5
Chronic  home meds, metoprolol , Nifedipine ,  add hydralazine if needed   Diuretic stopped 5/8 as per nephro  hold losartan 2/2 jerri.

## 2018-05-11 LAB
ANION GAP SERPL CALC-SCNC: 8 MMOL/L — SIGNIFICANT CHANGE UP (ref 5–17)
BUN SERPL-MCNC: 37 MG/DL — HIGH (ref 7–23)
CALCIUM SERPL-MCNC: 8.5 MG/DL — SIGNIFICANT CHANGE UP (ref 8.5–10.1)
CHLORIDE SERPL-SCNC: 98 MMOL/L — SIGNIFICANT CHANGE UP (ref 96–108)
CO2 SERPL-SCNC: 30 MMOL/L — SIGNIFICANT CHANGE UP (ref 22–31)
CREAT SERPL-MCNC: 4.2 MG/DL — HIGH (ref 0.5–1.3)
GLUCOSE SERPL-MCNC: 173 MG/DL — HIGH (ref 70–99)
HCT VFR BLD CALC: 25.8 % — LOW (ref 34.5–45)
HGB BLD-MCNC: 8 G/DL — LOW (ref 11.5–15.5)
MCHC RBC-ENTMCNC: 26.7 PG — LOW (ref 27–34)
MCHC RBC-ENTMCNC: 30.8 GM/DL — LOW (ref 32–36)
MCV RBC AUTO: 86.5 FL — SIGNIFICANT CHANGE UP (ref 80–100)
PLATELET # BLD AUTO: 240 K/UL — SIGNIFICANT CHANGE UP (ref 150–400)
POTASSIUM SERPL-MCNC: 4 MMOL/L — SIGNIFICANT CHANGE UP (ref 3.5–5.3)
POTASSIUM SERPL-SCNC: 4 MMOL/L — SIGNIFICANT CHANGE UP (ref 3.5–5.3)
RBC # BLD: 2.98 M/UL — LOW (ref 3.8–5.2)
RBC # FLD: 15.6 % — HIGH (ref 10.3–14.5)
SODIUM SERPL-SCNC: 136 MMOL/L — SIGNIFICANT CHANGE UP (ref 135–145)
WBC # BLD: 12.2 K/UL — HIGH (ref 3.8–10.5)
WBC # FLD AUTO: 12.2 K/UL — HIGH (ref 3.8–10.5)

## 2018-05-11 PROCEDURE — 99233 SBSQ HOSP IP/OBS HIGH 50: CPT | Mod: GC

## 2018-05-11 PROCEDURE — 36558 INSERT TUNNELED CV CATH: CPT

## 2018-05-11 PROCEDURE — 77001 FLUOROGUIDE FOR VEIN DEVICE: CPT | Mod: 26

## 2018-05-11 RX ORDER — ISOSORBIDE DINITRATE 5 MG/1
30 TABLET ORAL DAILY
Qty: 0 | Refills: 0 | Status: DISCONTINUED | OUTPATIENT
Start: 2018-05-11 | End: 2018-05-12

## 2018-05-11 RX ORDER — INSULIN LISPRO 100/ML
VIAL (ML) SUBCUTANEOUS AT BEDTIME
Qty: 0 | Refills: 0 | Status: DISCONTINUED | OUTPATIENT
Start: 2018-05-11 | End: 2018-05-11

## 2018-05-11 RX ORDER — DEXTROSE 50 % IN WATER 50 %
25 SYRINGE (ML) INTRAVENOUS ONCE
Qty: 0 | Refills: 0 | Status: DISCONTINUED | OUTPATIENT
Start: 2018-05-11 | End: 2018-05-14

## 2018-05-11 RX ORDER — DEXTROSE 50 % IN WATER 50 %
12.5 SYRINGE (ML) INTRAVENOUS ONCE
Qty: 0 | Refills: 0 | Status: DISCONTINUED | OUTPATIENT
Start: 2018-05-11 | End: 2018-05-11

## 2018-05-11 RX ORDER — GLUCAGON INJECTION, SOLUTION 0.5 MG/.1ML
1 INJECTION, SOLUTION SUBCUTANEOUS ONCE
Qty: 0 | Refills: 0 | Status: DISCONTINUED | OUTPATIENT
Start: 2018-05-11 | End: 2018-05-14

## 2018-05-11 RX ORDER — DEXTROSE 50 % IN WATER 50 %
15 SYRINGE (ML) INTRAVENOUS ONCE
Qty: 0 | Refills: 0 | Status: DISCONTINUED | OUTPATIENT
Start: 2018-05-11 | End: 2018-05-11

## 2018-05-11 RX ORDER — ERYTHROPOIETIN 10000 [IU]/ML
10000 INJECTION, SOLUTION INTRAVENOUS; SUBCUTANEOUS
Qty: 0 | Refills: 0 | COMMUNITY
Start: 2018-05-11

## 2018-05-11 RX ORDER — GLUCAGON INJECTION, SOLUTION 0.5 MG/.1ML
1 INJECTION, SOLUTION SUBCUTANEOUS ONCE
Qty: 0 | Refills: 0 | Status: DISCONTINUED | OUTPATIENT
Start: 2018-05-11 | End: 2018-05-11

## 2018-05-11 RX ORDER — SODIUM CHLORIDE 9 MG/ML
1000 INJECTION, SOLUTION INTRAVENOUS
Qty: 0 | Refills: 0 | Status: DISCONTINUED | OUTPATIENT
Start: 2018-05-11 | End: 2018-05-14

## 2018-05-11 RX ORDER — ACETAMINOPHEN 500 MG
650 TABLET ORAL ONCE
Qty: 0 | Refills: 0 | Status: COMPLETED | OUTPATIENT
Start: 2018-05-11 | End: 2018-05-11

## 2018-05-11 RX ORDER — SODIUM CHLORIDE 9 MG/ML
1000 INJECTION, SOLUTION INTRAVENOUS
Qty: 0 | Refills: 0 | Status: DISCONTINUED | OUTPATIENT
Start: 2018-05-11 | End: 2018-05-11

## 2018-05-11 RX ORDER — INSULIN LISPRO 100/ML
VIAL (ML) SUBCUTANEOUS EVERY 6 HOURS
Qty: 0 | Refills: 0 | Status: DISCONTINUED | OUTPATIENT
Start: 2018-05-11 | End: 2018-05-11

## 2018-05-11 RX ORDER — PANTOPRAZOLE SODIUM 20 MG/1
40 TABLET, DELAYED RELEASE ORAL
Qty: 0 | Refills: 0 | Status: DISCONTINUED | OUTPATIENT
Start: 2018-05-11 | End: 2018-05-14

## 2018-05-11 RX ORDER — DEXTROSE 50 % IN WATER 50 %
12.5 SYRINGE (ML) INTRAVENOUS ONCE
Qty: 0 | Refills: 0 | Status: DISCONTINUED | OUTPATIENT
Start: 2018-05-11 | End: 2018-05-14

## 2018-05-11 RX ORDER — DEXTROSE 50 % IN WATER 50 %
15 SYRINGE (ML) INTRAVENOUS ONCE
Qty: 0 | Refills: 0 | Status: DISCONTINUED | OUTPATIENT
Start: 2018-05-11 | End: 2018-05-14

## 2018-05-11 RX ORDER — INSULIN LISPRO 100/ML
VIAL (ML) SUBCUTANEOUS
Qty: 0 | Refills: 0 | Status: DISCONTINUED | OUTPATIENT
Start: 2018-05-11 | End: 2018-05-14

## 2018-05-11 RX ORDER — DEXTROSE 50 % IN WATER 50 %
25 SYRINGE (ML) INTRAVENOUS ONCE
Qty: 0 | Refills: 0 | Status: DISCONTINUED | OUTPATIENT
Start: 2018-05-11 | End: 2018-05-11

## 2018-05-11 RX ADMIN — GABAPENTIN 300 MILLIGRAM(S): 400 CAPSULE ORAL at 05:00

## 2018-05-11 RX ADMIN — ISOSORBIDE DINITRATE 30 MILLIGRAM(S): 5 TABLET ORAL at 05:00

## 2018-05-11 RX ADMIN — Medication 50 MILLIGRAM(S): at 05:00

## 2018-05-11 RX ADMIN — SENNA PLUS 2 TABLET(S): 8.6 TABLET ORAL at 21:14

## 2018-05-11 RX ADMIN — ATORVASTATIN CALCIUM 20 MILLIGRAM(S): 80 TABLET, FILM COATED ORAL at 21:14

## 2018-05-11 RX ADMIN — ERYTHROPOIETIN 10000 UNIT(S): 10000 INJECTION, SOLUTION INTRAVENOUS; SUBCUTANEOUS at 17:10

## 2018-05-11 RX ADMIN — Medication 650 MILLIGRAM(S): at 21:14

## 2018-05-11 RX ADMIN — INSULIN GLARGINE 8 UNIT(S): 100 INJECTION, SOLUTION SUBCUTANEOUS at 22:35

## 2018-05-11 NOTE — PROGRESS NOTE ADULT - SUBJECTIVE AND OBJECTIVE BOX
INTERVAL HPI/OVERNIGHT EVENTS:    68F PMHx CAD (3 stents 2007), DM type 2 on insulin, Peripheral neuropathy, HTN transferred from [place] ED for hemodialysis due to electrolyte abnormalities, shortness of breath sec to acute on chr diastolic chf , chest pain. Patient had right IJ  temporary cath placed 5/8, s/p HD x 2.     No events overnight. Patient seen and examined at bedside this AM. Patient c/o HA, weakness, indigestion and posterior neck pain. Plan for permacath today and HD after.    MEDICATIONS  (STANDING):  atorvastatin 20 milliGRAM(s) Oral at bedtime  dextrose 5%. 1000 milliLiter(s) (50 mL/Hr) IV Continuous <Continuous>  dextrose 50% Injectable 12.5 Gram(s) IV Push once  dextrose 50% Injectable 25 Gram(s) IV Push once  dextrose 50% Injectable 25 Gram(s) IV Push once  docusate sodium 100 milliGRAM(s) Oral two times a day  epoetin analilia Injectable 77782 Unit(s) IV Push <User Schedule>  gabapentin 300 milliGRAM(s) Oral two times a day  insulin glargine Injectable (LANTUS) 8 Unit(s) SubCutaneous at bedtime  insulin glargine Injectable (LANTUS) 26 Unit(s) SubCutaneous every morning  insulin lispro (HumaLOG) corrective regimen sliding scale   SubCutaneous three times a day before meals  isosorbide   dinitrate Tablet (ISORDIL) 30 milliGRAM(s) Oral two times a day  metoprolol tartrate 50 milliGRAM(s) Oral two times a day  senna 2 Tablet(s) Oral at bedtime    MEDICATIONS  (PRN):  dextrose Gel 1 Dose(s) Oral once PRN Blood Glucose LESS THAN 70 milliGRAM(s)/deciliter  glucagon  Injectable 1 milliGRAM(s) IntraMuscular once PRN Glucose LESS THAN 70 milligrams/deciliter  simethicone 80 milliGRAM(s) Chew daily PRN Heartburn      Allergies    No Known Allergies    Intolerances        CONSTITUTIONAL: weakness, no fevers or chills  EYES/ENT: No visual changes;  No vertigo or throat pain   NECK: +pain, no stiffness  RESPIRATORY: No cough, wheezing, hemoptysis; No shortness of breath  CARDIOVASCULAR: No chest pain or palpitations  GASTROINTESTINAL: +Indigestion, No abdominal or epigastric pain. No nausea, vomiting, or hematemesis; No diarrhea or constipation. No melena or hematochezia.  GENITOURINARY: No dysuria, frequency or hematuria  NEUROLOGICAL: No numbness or weakness  SKIN: No itching, burning, rashes, or lesions   All other review of systems is negative unless indicated above.    Vital Signs Last 24 Hrs  T(C): 36.5 (11 May 2018 12:00), Max: 37 (10 May 2018 23:47)  T(F): 97.7 (11 May 2018 12:00), Max: 98.6 (10 May 2018 23:47)  HR: 64 (11 May 2018 12:00) (64 - 77)  BP: 177/73 (11 May 2018 12:00) (107/61 - 177/73)  BP(mean): --  RR: 19 (11 May 2018 12:00) (16 - 19)  SpO2: 97% (11 May 2018 12:00) (93% - 97%)      PHYSICAL EXAM:  GENERAL: NAD, well-groomed, well-developed  HEAD:  Atraumatic, Normocephalic  EYES: EOMI, PERRLA, conjunctiva and sclera clear  ENMT: No tonsillar erythema, exudates, or enlargement; Moist mucous membranes, Good dentition, No lesions  NECK: Supple, No JVD, Normal thyroid  NERVOUS SYSTEM:  Alert & Oriented X3, Good concentration; Motor Strength 5/5 B/L upper and lower extremities; DTRs 2+ intact and symmetric  CHEST/LUNG: Clear to auscultation bilaterally; No rales, rhonchi, wheezing, or rubs  HEART: Regular rate and rhythm; No murmurs, rubs, or gallops  ABDOMEN: Soft, Nontender, Nondistended; Bowel sounds present  EXTREMITIES:  2+ Peripheral Pulses, No clubbing, cyanosis, or edema  LYMPH: No lymphadenopathy noted  SKIN: No rashes or lesions      LABS:                        8.0    12.2  )-----------( 240      ( 11 May 2018 06:10 )             25.8     05-11    136  |  98  |  37<H>  ----------------------------<  173<H>  4.0   |  30  |  4.20<H>    Ca    8.5      11 May 2018 06:10            RADIOLOGY & ADDITIONAL TESTS:          < from: US Guided Vascular Access (05.08.18 @ 12:14) >    EXAM:  IR PROCEDURE NON PICC                          EXAM:  US GUIDANCE VASCULAR ACCESS                            PROCEDURE DATE:  05/08/2018          INTERPRETATION:    Temporary dialysis catheter placement:    Clinical History:    Acute renal insufficiency.    Technique:    An informed consent obtained. Risks and benefits explained. Sedation   provided by anesthesiologist.    Patient placed supine on the angiographic table. Time out procedure was   performed. Limited ultrasonography demonstrated patent right internal   jugular vein.   The right neck prepped and draped in sterile fashion. 1% lidocaine   utilized for local anesthesia. Under ultrasound guidance, the   rightinternal jugular vein accessed using micropuncture needle. Hard copy   ultrasound image obtained. Under fluoroscopy, needle exchanged to a   microcatheter over the microwire. Through the microcatheter, a J   guidewire placed into the superior vena cava. Over the J guidewire,   access tract dilated with a 8 and 12 Haitian dilator. Subsequently, 14.5   [place]ian dual-lumen temporary dialysis catheter placed under fluoroscopy   with the tip in distal superior vena cava. Hemostasis secured by manual   compression. The catheter anchored to the skin using suture. The site   dressed in sterile fashion. Both ports flushed with normal saline. Both   the ports filled with 5000 units heparin fluid. Patient tolerated the   procedure well. Patient transferred to floor for further observation and   management in stable condition.    Impression:    Successful ultrasound and fluoroscopy guided right internal jugular vein   temporary nontunneled dialysis catheter placement. Catheter is ready for   hemodialysis.    < end of copied text >    < from: Xray Chest 1 View- PORTABLE-Urgent (05.03.18 @ 17:49) >    EXAM:  XR CHEST PORTABLE URGENT 1V                                  PROCEDURE DATE:  05/03/2018          INTERPRETATION:  Chest radiograph (one view)     CPT 47564    CLINICAL INFORMATION: Shortness of breath of unknown severity since today.    TECHNIQUE:  Single frontal view of the chest was obtained.    FINDINGS:  No previous examinations are available for review.    The lungs demonstrate increased pulmonary vascular congestion. No pleural   effusion is seen. The heart and mediastinum appear intact.           IMPRESSION: Increased pulmonary vascular congestion noted. No gross   consolidation is seen.                           < end of copied text >

## 2018-05-11 NOTE — PROGRESS NOTE ADULT - ASSESSMENT
68F PMHx CAD (3 stents 2007), DM type 2 on insulin, Peripheral neuropathy, HTN transferred from [place] ED for hemodialysis due to electrolyte abnormalities, shortness of breath sec to acute on chr diastolic chf , chest pain.Patient had right IJ  temporary cath placed 5/8, s/p HD x 2. Plan for permacath and HD today.

## 2018-05-11 NOTE — PROGRESS NOTE ADULT - PROBLEM SELECTOR PLAN 3
proBNP elevated 2/2 to  acute on chronic diastolic heart failure  Will diurese with HD today  Will follow up with cardio to decrease Isordil to QD  Emphasized need for salt and fluid restriction of <35oz/day  Echocardiogram with low normal LV systolic function, no significant valve issues.  stopped iv lasix in setting of ckd   daily wt , strict /os   Dr. Mena (Cardio) consulted

## 2018-05-11 NOTE — PROGRESS NOTE ADULT - ASSESSMENT
The patient is a 68 year old female with a history of HTN, DM, CAD s/p PCI in 2007, ESRD not on HD who presents with chest pain and shortness of breath.    Plan:  - Chest pain likely musculoskeletal in etiology given reproducibility of pain. Currently pain free.  - Troponin mildly elevated at 0.159 and trended down. Likely due to acute diastolic heart failure and retention of enzymes from ESRD. No need to trend further.  - Echocardiogram with low normal LV systolic function, no significant valve issues.  - Continue metoprolol, isordil for HTN  - Continue aspirin 81 mg daily  - Renal follow-up. HD as per renal. Off of PO diuretics.

## 2018-05-11 NOTE — PROGRESS NOTE ADULT - SUBJECTIVE AND OBJECTIVE BOX
Chief Complaint: Chest pain, shortness of breath    Interval Events: No events overnight. No complaints.    Review of Systems:  General: No fevers, chills, weight loss or gain  Skin: No rashes, color changes  Cardiovascular: No chest pain, orthopnea  Respiratory: No shortness of breath, cough  Gastrointestinal: No nausea, abdominal pain  Genitourinary: No incontinence, pain with urination  Musculoskeletal: No pain, swelling, decreased range of motion  Neurological: No headache, weakness  Psychiatric: No depression, anxiety  Endocrine: No weight loss or gain, increased thirst  All other systems are comprehensively negative.    Physical Exam:  Vital Signs Last 24 Hrs  T(C): 36.5 (11 May 2018 08:00), Max: 37 (10 May 2018 23:47)  T(F): 97.7 (11 May 2018 08:00), Max: 98.6 (10 May 2018 23:47)  HR: 70 (11 May 2018 08:00) (70 - 77)  BP: 151/81 (11 May 2018 08:00) (107/61 - 151/81)  BP(mean): --  RR: 18 (11 May 2018 08:00) (16 - 18)  SpO2: 95% (11 May 2018 08:00) (93% - 96%)  General: NAD  HEENT: MMM  Neck: No JVD, no carotid bruit  Lungs: CTAB  CV: RRR, nl S1/S2, no M/R/G  Abdomen: S/NT/ND, +BS  Extremities: No LE edema, no cyanosis  Neuro: AAOx3, non-focal  Skin: No rash    Labs:             05-11    136  |  98  |  37<H>  ----------------------------<  173<H>  4.0   |  30  |  4.20<H>    Ca    8.5      11 May 2018 06:10                          8.0    12.2  )-----------( 240      ( 11 May 2018 06:10 )             25.8         Telemetry: Sinus rhythm

## 2018-05-11 NOTE — PROGRESS NOTE ADULT - ATTENDING COMMENTS
pt has mild sob, has mild crackles and needs HD today after percath and may another session at am, then she can discharge home and f/u HD as outpatient.

## 2018-05-11 NOTE — PROGRESS NOTE ADULT - SUBJECTIVE AND OBJECTIVE BOX
Patient is a 68y old  Female who presents with a chief complaint of SOB, mild chest pain (04 May 2018 12:48)      Patient seen in follow up for CKD 5, fluid overload. 3rd HD today. For permacath today.     PAST MEDICAL HISTORY:  CAD (coronary artery disease)  Diabetes  CRF (chronic renal failure)  Hypertension  Pneumonia  Myocardial infarction  Hypertension  Diabetes    MEDICATIONS  (STANDING):  atorvastatin 20 milliGRAM(s) Oral at bedtime  dextrose 5%. 1000 milliLiter(s) (50 mL/Hr) IV Continuous <Continuous>  dextrose 50% Injectable 12.5 Gram(s) IV Push once  dextrose 50% Injectable 25 Gram(s) IV Push once  dextrose 50% Injectable 25 Gram(s) IV Push once  docusate sodium 100 milliGRAM(s) Oral two times a day  epoetin analilia Injectable 58848 Unit(s) IV Push <User Schedule>  gabapentin 300 milliGRAM(s) Oral two times a day  insulin glargine Injectable (LANTUS) 8 Unit(s) SubCutaneous at bedtime  insulin glargine Injectable (LANTUS) 26 Unit(s) SubCutaneous every morning  insulin lispro (HumaLOG) corrective regimen sliding scale   SubCutaneous every 6 hours  isosorbide   dinitrate Tablet (ISORDIL) 30 milliGRAM(s) Oral daily  metoprolol tartrate 50 milliGRAM(s) Oral two times a day  pantoprazole    Tablet 40 milliGRAM(s) Oral before breakfast  senna 2 Tablet(s) Oral at bedtime    MEDICATIONS  (PRN):  acetaminophen   Tablet. 650 milliGRAM(s) Oral once PRN Mild Pain (1 - 3)  dextrose 40% Gel 15 Gram(s) Oral once PRN Blood Glucose LESS THAN 70 milliGRAM(s)/deciliter  glucagon  Injectable 1 milliGRAM(s) IntraMuscular once PRN Glucose LESS THAN 70 milligrams/deciliter  simethicone 80 milliGRAM(s) Chew daily PRN Heartburn    T(C): 36.5 (05-11-18 @ 12:00), Max: 37.1 (05-09-18 @ 21:00)  HR: 64 (05-11-18 @ 12:00) (64 - 77)  BP: 177/73 (05-11-18 @ 12:00) (107/61 - 177/73)  RR: 19 (05-11-18 @ 12:00)  SpO2: 97% (05-11-18 @ 12:00)  Wt(kg): --  I&O's Detail              PHYSICAL EXAM:  General: NAD  Respiratory: b/l air entry  Cardiovascular: S1 S2  Gastrointestinal: soft  Extremities:  + edema               LABORATORY:                        8.0    12.2  )-----------( 240      ( 11 May 2018 06:10 )             25.8     05-11    136  |  98  |  37<H>  ----------------------------<  173<H>  4.0   |  30  |  4.20<H>    Ca    8.5      11 May 2018 06:10      Sodium, Serum: 136 mmol/L (05-11 @ 06:10)  Sodium, Serum: 139 mmol/L (05-10 @ 07:12)    Potassium, Serum: 4.0 mmol/L (05-11 @ 06:10)  Potassium, Serum: 3.6 mmol/L (05-10 @ 07:12)    Hemoglobin: 8.0 g/dL (05-11 @ 06:10)  Hemoglobin: 8.1 g/dL (05-10 @ 07:12)  Hemoglobin: 7.6 g/dL (05-09 @ 07:06)    Creatinine, Serum 4.20 (05-11 @ 06:10)  Creatinine, Serum 2.80 (05-10 @ 07:12)  Creatinine, Serum 4.10 (05-09 @ 07:06)

## 2018-05-11 NOTE — PROGRESS NOTE ADULT - PROBLEM SELECTOR PLAN 1
Improving  jerri on CKD  Permcath today  Patient had right IJ  temporary cath placed 5/8, s/p HD x 2.   As per cardio, Aspirin and clopidogrel held for permcath placement  s/p kayexalate and Albuterol for hypokalemia  Dr. Albarran (Renal) on board Improving  jerri on CKD5  Permcath today  Patient had right IJ  temporary cath placed 5/8, s/p HD x 2.   As per cardio, Aspirin and clopidogrel held for permcath placement  s/p kayexalate and Albuterol for hypokalemia  Dr. Albarran (Renal) on board

## 2018-05-11 NOTE — PROGRESS NOTE ADULT - ASSESSMENT
·	ESRD on HD  ·	CHF  ·	Anemia  ·	Hyperkalemia  ·	Diabetes  ·	Hypertension    3rd treatment today. Monitor h/h trend. On Epogen. K levels better. Permacath today.   Monitor blood sugar levels. Insulin coverage as needed. Dietary restriction.   Monitor BP trend. Titrate BP meds as needed. Salt restriction.    for out pt dialysis arrangements. D/c planning post HD tomorrow.

## 2018-05-11 NOTE — PROGRESS NOTE ADULT - PROBLEM SELECTOR PLAN 2
Improving  Likely anemia of chronic disease given CKD.   iron studies show low TIBC, high Ferritin  f/u FOBT   [facility] medical student will get out pt lab result

## 2018-05-11 NOTE — PROGRESS NOTE ADULT - PROBLEM SELECTOR PLAN 6
- NPO for procedure today, adjusted LDISS and accuchecks to Q6hrs, will adjust after procedure  - type2   - hgb a1c 7.9  - low dose ISS with accuchecks, hypoglycemic protocol  - Home lantus dose (30 AM, 10 PM) adjusted to (26 AM, 8 qhs)

## 2018-05-12 LAB
ANION GAP SERPL CALC-SCNC: 8 MMOL/L — SIGNIFICANT CHANGE UP (ref 5–17)
BUN SERPL-MCNC: 24 MG/DL — HIGH (ref 7–23)
CALCIUM SERPL-MCNC: 8.7 MG/DL — SIGNIFICANT CHANGE UP (ref 8.5–10.1)
CHLORIDE SERPL-SCNC: 97 MMOL/L — SIGNIFICANT CHANGE UP (ref 96–108)
CO2 SERPL-SCNC: 30 MMOL/L — SIGNIFICANT CHANGE UP (ref 22–31)
CREAT SERPL-MCNC: 3.5 MG/DL — HIGH (ref 0.5–1.3)
GLUCOSE SERPL-MCNC: 185 MG/DL — HIGH (ref 70–99)
HCT VFR BLD CALC: 27 % — LOW (ref 34.5–45)
HGB BLD-MCNC: 8.2 G/DL — LOW (ref 11.5–15.5)
MCHC RBC-ENTMCNC: 26.2 PG — LOW (ref 27–34)
MCHC RBC-ENTMCNC: 30.3 GM/DL — LOW (ref 32–36)
MCV RBC AUTO: 86.4 FL — SIGNIFICANT CHANGE UP (ref 80–100)
PLATELET # BLD AUTO: 244 K/UL — SIGNIFICANT CHANGE UP (ref 150–400)
POTASSIUM SERPL-MCNC: 3.9 MMOL/L — SIGNIFICANT CHANGE UP (ref 3.5–5.3)
POTASSIUM SERPL-SCNC: 3.9 MMOL/L — SIGNIFICANT CHANGE UP (ref 3.5–5.3)
RBC # BLD: 3.12 M/UL — LOW (ref 3.8–5.2)
RBC # FLD: 15.8 % — HIGH (ref 10.3–14.5)
SODIUM SERPL-SCNC: 135 MMOL/L — SIGNIFICANT CHANGE UP (ref 135–145)
WBC # BLD: 13.3 K/UL — HIGH (ref 3.8–10.5)
WBC # FLD AUTO: 13.3 K/UL — HIGH (ref 3.8–10.5)

## 2018-05-12 PROCEDURE — 99233 SBSQ HOSP IP/OBS HIGH 50: CPT

## 2018-05-12 RX ORDER — CLOPIDOGREL BISULFATE 75 MG/1
75 TABLET, FILM COATED ORAL DAILY
Qty: 0 | Refills: 0 | Status: DISCONTINUED | OUTPATIENT
Start: 2018-05-12 | End: 2018-05-14

## 2018-05-12 RX ORDER — ASPIRIN/CALCIUM CARB/MAGNESIUM 324 MG
81 TABLET ORAL DAILY
Qty: 0 | Refills: 0 | Status: DISCONTINUED | OUTPATIENT
Start: 2018-05-12 | End: 2018-05-14

## 2018-05-12 RX ORDER — ISOSORBIDE MONONITRATE 60 MG/1
30 TABLET, EXTENDED RELEASE ORAL DAILY
Qty: 0 | Refills: 0 | Status: DISCONTINUED | OUTPATIENT
Start: 2018-05-12 | End: 2018-05-14

## 2018-05-12 RX ORDER — ACETAMINOPHEN 500 MG
650 TABLET ORAL ONCE
Qty: 0 | Refills: 0 | Status: COMPLETED | OUTPATIENT
Start: 2018-05-12 | End: 2018-05-12

## 2018-05-12 RX ADMIN — Medication 650 MILLIGRAM(S): at 05:47

## 2018-05-12 RX ADMIN — SIMETHICONE 80 MILLIGRAM(S): 80 TABLET, CHEWABLE ORAL at 22:45

## 2018-05-12 RX ADMIN — Medication 1: at 08:27

## 2018-05-12 RX ADMIN — GABAPENTIN 300 MILLIGRAM(S): 400 CAPSULE ORAL at 17:26

## 2018-05-12 RX ADMIN — SENNA PLUS 2 TABLET(S): 8.6 TABLET ORAL at 22:43

## 2018-05-12 RX ADMIN — Medication 2: at 12:29

## 2018-05-12 RX ADMIN — GABAPENTIN 300 MILLIGRAM(S): 400 CAPSULE ORAL at 05:31

## 2018-05-12 RX ADMIN — INSULIN GLARGINE 8 UNIT(S): 100 INJECTION, SOLUTION SUBCUTANEOUS at 22:44

## 2018-05-12 RX ADMIN — PANTOPRAZOLE SODIUM 40 MILLIGRAM(S): 20 TABLET, DELAYED RELEASE ORAL at 05:31

## 2018-05-12 RX ADMIN — Medication 100 MILLIGRAM(S): at 05:31

## 2018-05-12 RX ADMIN — INSULIN GLARGINE 26 UNIT(S): 100 INJECTION, SOLUTION SUBCUTANEOUS at 08:35

## 2018-05-12 RX ADMIN — ATORVASTATIN CALCIUM 20 MILLIGRAM(S): 80 TABLET, FILM COATED ORAL at 22:43

## 2018-05-12 RX ADMIN — ISOSORBIDE MONONITRATE 30 MILLIGRAM(S): 60 TABLET, EXTENDED RELEASE ORAL at 12:07

## 2018-05-12 RX ADMIN — Medication 100 MILLIGRAM(S): at 17:26

## 2018-05-12 RX ADMIN — Medication 650 MILLIGRAM(S): at 06:30

## 2018-05-12 RX ADMIN — Medication 2: at 17:26

## 2018-05-12 RX ADMIN — Medication 50 MILLIGRAM(S): at 05:31

## 2018-05-12 RX ADMIN — Medication 50 MILLIGRAM(S): at 17:26

## 2018-05-12 NOTE — PROGRESS NOTE ADULT - PROBLEM SELECTOR PLAN 1
Improving  jerri on CKD5  s/p R permacath   Patient had right IJ  temporary cath placed 5/8, s/p HD x 2.   restart Aspirin and clopidogrel tomorrow as was held for permcath placement  s/p kayexalate and Albuterol for hypokalemia  Dr. lAbarran (Renal) recs apprec

## 2018-05-12 NOTE — PROGRESS NOTE ADULT - ASSESSMENT
68F PMHx CAD (3 stents 2007), DM type 2 on insulin, Peripheral neuropathy, HTN transferred from [place] ED for hemodialysis due to electrolyte abnormalities, shortness of breath sec to acute on chr diastolic chf , chest pain.Patient had right IJ  temporary cath placed 5/8, s/p HD x 2. s/p permacath, as per SW and family awaiting for slot for outpt dialysis

## 2018-05-12 NOTE — PROGRESS NOTE ADULT - PROBLEM SELECTOR PLAN 2
Improving  Likely anemia of chronic disease given CKD.   iron studies show low TIBC, high Ferritin  f/u FOBT

## 2018-05-12 NOTE — PROGRESS NOTE ADULT - ASSESSMENT
The patient is a 68 year old female with a history of HTN, DM, CAD s/p PCI in 2007, ESRD not on HD who presents with chest pain and shortness of breath.    Plan:  - Chest pain likely musculoskeletal in etiology given reproducibility of pain. Currently pain free.  - Troponin mildly elevated at 0.159 and trended down. Likely due to acute diastolic heart failure and retention of enzymes from ESRD. No need to trend further.  - Echocardiogram with low normal LV systolic function, no significant valve issues.  - Continue metoprolol. Switch to isosorbide mononitrate 30 mg daily.  - Continue aspirin 81 mg daily  - Renal follow-up. HD as per renal.  - Discharge planning

## 2018-05-12 NOTE — PROGRESS NOTE ADULT - PROBLEM SELECTOR PLAN 7
- hx MI 2007 s/p stents   - No acute EKG change  - restart ASA and plavix as above  - c/w statin   - Cardio (Dr. Mosher) recs apprec

## 2018-05-12 NOTE — PROGRESS NOTE ADULT - PROBLEM SELECTOR PLAN 3
proBNP elevated 2/2 to  acute on chronic diastolic heart failure  diurese as per renal  monitor i/os, daily weights  Will follow up with cardio to decrease Isordil to QD  Emphasized need for salt and fluid restriction of <35oz/day  Echocardiogram with low normal LV systolic function, no significant valve issues.  stopped iv lasix in setting of ckd   Dr. Mena (Cardio) consulted

## 2018-05-12 NOTE — PROGRESS NOTE ADULT - PROBLEM SELECTOR PLAN 6
- type2   - hgb a1c 7.9  -ldiss with hypoglycemic protocol  - Home lantus dose (30 AM, 10 PM) adjusted to (26 AM, 8 qhs)

## 2018-05-12 NOTE — PROGRESS NOTE ADULT - SUBJECTIVE AND OBJECTIVE BOX
Patient is a 68y old  Female who presents with a chief complaint of SOB, mild chest pain (08 May 2018 12:20)      INTERVAL HPI: Pt seen and examined. States she is feeling well has some neck pain in R where catheter was placed,  initially used  ID #[id number]. Niece and nephew entered room and patient preferred to use them for intepretation vs  phone.     OVERNIGHT EVENTS: none noted  T(F): 98.3 (05-12-18 @ 19:57), Max: 98.3 (05-12-18 @ 00:10)  HR: 68 (05-12-18 @ 19:57) (62 - 84)  BP: 158/84 (05-12-18 @ 19:57) (112/64 - 158/84)  RR: 15 (05-12-18 @ 19:57) (15 - 18)  SpO2: 100% (05-12-18 @ 19:57) (93% - 100%)  I&O's Summary    11 May 2018 07:01  -  12 May 2018 07:00  --------------------------------------------------------  IN: 800 mL / OUT: 2500 mL / NET: -1700 mL        REVIEW OF SYSTEMS: 12 systems noncontributory other than that stated in hpi    PHYSICAL EXAM:  GENERAL: NAD, well-groomed, well-developed, elder, obese, annamaria speaking  HEAD:  Atraumatic, Normocephalic  EYES: EOMI, PERRLA, conjunctiva and sclera clear  ENMT: No tonsillar erythema, exudates, or enlargement; Moist mucous membranes, Good dentition, No lesions  NECK: Supple, No JVD,   NERVOUS SYSTEM:  Alert & Oriented X3, Good concentration; Motor Strength 5/5 B/L upper and lower extremities; DTRs 2+ intact and symmetric  CHEST/LUNG: Clear to percussion bilaterally; No rales, rhonchi, wheezing, or rubs  HEART: Regular rate and rhythm; No murmurs, rubs, or gallops  ABDOMEN: Soft, Nontender, Nondistended; Bowel sounds present  EXTREMITIES:  2+ Peripheral Pulses, No clubbing, cyanosis, or edema  SKIN:  Right upper chest catheter in place, signs of erythem or drainage, intact, dressings c/d/i    LABS:                        8.2    13.3  )-----------( 244      ( 12 May 2018 06:31 )             27.0     05-12    135  |  97  |  24<H>  ----------------------------<  185<H>  3.9   |  30  |  3.50<H>    Ca    8.7      12 May 2018 06:31          CAPILLARY BLOOD GLUCOSE      POCT Blood Glucose.: 159 mg/dL (12 May 2018 22:04)  POCT Blood Glucose.: 233 mg/dL (12 May 2018 16:42)  POCT Blood Glucose.: 243 mg/dL (12 May 2018 11:52)  POCT Blood Glucose.: 188 mg/dL (12 May 2018 07:47)              MEDICATIONS  (STANDING):  atorvastatin 20 milliGRAM(s) Oral at bedtime  dextrose 5%. 1000 milliLiter(s) (50 mL/Hr) IV Continuous <Continuous>  dextrose 50% Injectable 12.5 Gram(s) IV Push once  dextrose 50% Injectable 25 Gram(s) IV Push once  dextrose 50% Injectable 25 Gram(s) IV Push once  docusate sodium 100 milliGRAM(s) Oral two times a day  epoetin analilia Injectable 77810 Unit(s) IV Push <User Schedule>  gabapentin 300 milliGRAM(s) Oral two times a day  insulin glargine Injectable (LANTUS) 8 Unit(s) SubCutaneous at bedtime  insulin glargine Injectable (LANTUS) 26 Unit(s) SubCutaneous every morning  insulin lispro (HumaLOG) corrective regimen sliding scale   SubCutaneous three times a day before meals  isosorbide   mononitrate ER Tablet (IMDUR) 30 milliGRAM(s) Oral daily  metoprolol tartrate 50 milliGRAM(s) Oral two times a day  pantoprazole    Tablet 40 milliGRAM(s) Oral before breakfast  senna 2 Tablet(s) Oral at bedtime    MEDICATIONS  (PRN):  dextrose 40% Gel 15 Gram(s) Oral once PRN Blood Glucose LESS THAN 70 milliGRAM(s)/deciliter  glucagon  Injectable 1 milliGRAM(s) IntraMuscular once PRN Glucose LESS THAN 70 milligrams/deciliter  simethicone 80 milliGRAM(s) Chew daily PRN Heartburn

## 2018-05-12 NOTE — PROGRESS NOTE ADULT - SUBJECTIVE AND OBJECTIVE BOX
The patient was evaluated. No problems reported.  68y Female    T(C): 36.4 (05-12-18 @ 07:52), Max: 36.8 (05-12-18 @ 00:10)  HR: 62 (05-12-18 @ 07:52) (62 - 84)  BP: 112/64 (05-12-18 @ 07:52) (112/64 - 185/70)  RR: 16 (05-12-18 @ 07:52) (16 - 19)  SpO2: 99% (05-12-18 @ 07:52) (93% - 99%)  Wt(kg): --    Pt seen, doing well, no anesthesia complications or complaints noted or reported.   No Nausea    No additional recommendations.     Pain well controlled

## 2018-05-12 NOTE — PROGRESS NOTE ADULT - SUBJECTIVE AND OBJECTIVE BOX
Chief Complaint: Chest pain, shortness of breath    Interval Events: No events overnight. Sleeping this morning.    Review of Systems:  General: No fevers, chills, weight loss or gain  Skin: No rashes, color changes  Cardiovascular: No chest pain, orthopnea  Respiratory: No shortness of breath, cough  Gastrointestinal: No nausea, abdominal pain  Genitourinary: No incontinence, pain with urination  Musculoskeletal: No pain, swelling, decreased range of motion  Neurological: No headache, weakness  Psychiatric: No depression, anxiety  Endocrine: No weight loss or gain, increased thirst  All other systems are comprehensively negative.    Physical Exam:  Vital Signs Last 24 Hrs  T(C): 36.4 (12 May 2018 07:52), Max: 36.8 (12 May 2018 00:10)  T(F): 97.6 (12 May 2018 07:52), Max: 98.3 (12 May 2018 00:10)  HR: 62 (12 May 2018 07:52) (62 - 84)  BP: 112/64 (12 May 2018 07:52) (112/64 - 185/70)  BP(mean): --  RR: 16 (12 May 2018 07:52) (16 - 19)  SpO2: 99% (12 May 2018 07:52) (93% - 99%)  General: NAD  HEENT: MMM  Neck: No JVD, no carotid bruit  Lungs: CTAB  CV: RRR, nl S1/S2, no M/R/G  Abdomen: S/NT/ND, +BS  Extremities: No LE edema, no cyanosis  Neuro: AAOx3, non-focal  Skin: No rash    Labs:             05-12    135  |  97  |  24<H>  ----------------------------<  185<H>  3.9   |  30  |  3.50<H>    Ca    8.7      12 May 2018 06:31                          8.2    13.3  )-----------( 244      ( 12 May 2018 06:31 )             27.0         Telemetry: Sinus rhythm

## 2018-05-13 LAB
ALBUMIN SERPL ELPH-MCNC: 2.7 G/DL — LOW (ref 3.3–5)
ALP SERPL-CCNC: 252 U/L — HIGH (ref 40–120)
ALT FLD-CCNC: 49 U/L — SIGNIFICANT CHANGE UP (ref 12–78)
ANION GAP SERPL CALC-SCNC: 10 MMOL/L — SIGNIFICANT CHANGE UP (ref 5–17)
AST SERPL-CCNC: 47 U/L — HIGH (ref 15–37)
BASOPHILS # BLD AUTO: 0 K/UL — SIGNIFICANT CHANGE UP (ref 0–0.2)
BASOPHILS NFR BLD AUTO: 0.4 % — SIGNIFICANT CHANGE UP (ref 0–2)
BILIRUB SERPL-MCNC: 0.3 MG/DL — SIGNIFICANT CHANGE UP (ref 0.2–1.2)
BUN SERPL-MCNC: 37 MG/DL — HIGH (ref 7–23)
CALCIUM SERPL-MCNC: 8.8 MG/DL — SIGNIFICANT CHANGE UP (ref 8.5–10.1)
CHLORIDE SERPL-SCNC: 101 MMOL/L — SIGNIFICANT CHANGE UP (ref 96–108)
CO2 SERPL-SCNC: 29 MMOL/L — SIGNIFICANT CHANGE UP (ref 22–31)
CREAT SERPL-MCNC: 4.3 MG/DL — HIGH (ref 0.5–1.3)
EOSINOPHIL # BLD AUTO: 0.3 K/UL — SIGNIFICANT CHANGE UP (ref 0–0.5)
EOSINOPHIL NFR BLD AUTO: 2.9 % — SIGNIFICANT CHANGE UP (ref 0–6)
GLUCOSE SERPL-MCNC: 102 MG/DL — HIGH (ref 70–99)
HCT VFR BLD CALC: 26.8 % — LOW (ref 34.5–45)
HGB BLD-MCNC: 8.2 G/DL — LOW (ref 11.5–15.5)
LYMPHOCYTES # BLD AUTO: 35.3 % — SIGNIFICANT CHANGE UP (ref 13–44)
LYMPHOCYTES # BLD AUTO: 4.1 K/UL — HIGH (ref 1–3.3)
MCHC RBC-ENTMCNC: 26.6 PG — LOW (ref 27–34)
MCHC RBC-ENTMCNC: 30.7 GM/DL — LOW (ref 32–36)
MCV RBC AUTO: 86.8 FL — SIGNIFICANT CHANGE UP (ref 80–100)
MONOCYTES # BLD AUTO: 0.8 K/UL — SIGNIFICANT CHANGE UP (ref 0–0.9)
MONOCYTES NFR BLD AUTO: 6.8 % — SIGNIFICANT CHANGE UP (ref 1–9)
NEUTROPHILS # BLD AUTO: 6.3 K/UL — SIGNIFICANT CHANGE UP (ref 1.8–7.4)
NEUTROPHILS NFR BLD AUTO: 54.7 % — SIGNIFICANT CHANGE UP (ref 43–77)
PLATELET # BLD AUTO: 247 K/UL — SIGNIFICANT CHANGE UP (ref 150–400)
POTASSIUM SERPL-MCNC: 4.2 MMOL/L — SIGNIFICANT CHANGE UP (ref 3.5–5.3)
POTASSIUM SERPL-SCNC: 4.2 MMOL/L — SIGNIFICANT CHANGE UP (ref 3.5–5.3)
PROCALCITONIN SERPL-MCNC: 0.3 NG/ML — HIGH (ref 0–0.04)
PROT SERPL-MCNC: 7.1 G/DL — SIGNIFICANT CHANGE UP (ref 6–8.3)
RBC # BLD: 3.08 M/UL — LOW (ref 3.8–5.2)
RBC # FLD: 16.1 % — HIGH (ref 10.3–14.5)
SODIUM SERPL-SCNC: 140 MMOL/L — SIGNIFICANT CHANGE UP (ref 135–145)
WBC # BLD: 11.5 K/UL — HIGH (ref 3.8–10.5)
WBC # FLD AUTO: 11.5 K/UL — HIGH (ref 3.8–10.5)

## 2018-05-13 PROCEDURE — 99233 SBSQ HOSP IP/OBS HIGH 50: CPT

## 2018-05-13 RX ORDER — ACETAMINOPHEN 500 MG
650 TABLET ORAL EVERY 6 HOURS
Qty: 0 | Refills: 0 | Status: DISCONTINUED | OUTPATIENT
Start: 2018-05-13 | End: 2018-05-14

## 2018-05-13 RX ORDER — HEPARIN SODIUM 5000 [USP'U]/ML
5000 INJECTION INTRAVENOUS; SUBCUTANEOUS EVERY 12 HOURS
Qty: 0 | Refills: 0 | Status: DISCONTINUED | OUTPATIENT
Start: 2018-05-13 | End: 2018-05-14

## 2018-05-13 RX ADMIN — Medication 100 MILLIGRAM(S): at 06:10

## 2018-05-13 RX ADMIN — SENNA PLUS 2 TABLET(S): 8.6 TABLET ORAL at 21:28

## 2018-05-13 RX ADMIN — ISOSORBIDE MONONITRATE 30 MILLIGRAM(S): 60 TABLET, EXTENDED RELEASE ORAL at 12:34

## 2018-05-13 RX ADMIN — INSULIN GLARGINE 26 UNIT(S): 100 INJECTION, SOLUTION SUBCUTANEOUS at 08:44

## 2018-05-13 RX ADMIN — CLOPIDOGREL BISULFATE 75 MILLIGRAM(S): 75 TABLET, FILM COATED ORAL at 12:34

## 2018-05-13 RX ADMIN — Medication 3: at 17:20

## 2018-05-13 RX ADMIN — PANTOPRAZOLE SODIUM 40 MILLIGRAM(S): 20 TABLET, DELAYED RELEASE ORAL at 06:10

## 2018-05-13 RX ADMIN — GABAPENTIN 300 MILLIGRAM(S): 400 CAPSULE ORAL at 17:21

## 2018-05-13 RX ADMIN — ATORVASTATIN CALCIUM 20 MILLIGRAM(S): 80 TABLET, FILM COATED ORAL at 21:28

## 2018-05-13 RX ADMIN — Medication 1: at 12:34

## 2018-05-13 RX ADMIN — Medication 50 MILLIGRAM(S): at 17:21

## 2018-05-13 RX ADMIN — Medication 50 MILLIGRAM(S): at 06:10

## 2018-05-13 RX ADMIN — GABAPENTIN 300 MILLIGRAM(S): 400 CAPSULE ORAL at 06:10

## 2018-05-13 RX ADMIN — Medication 81 MILLIGRAM(S): at 12:33

## 2018-05-13 RX ADMIN — SIMETHICONE 80 MILLIGRAM(S): 80 TABLET, CHEWABLE ORAL at 21:28

## 2018-05-13 RX ADMIN — Medication 650 MILLIGRAM(S): at 17:51

## 2018-05-13 RX ADMIN — Medication 0.25 MILLIGRAM(S): at 17:20

## 2018-05-13 RX ADMIN — INSULIN GLARGINE 8 UNIT(S): 100 INJECTION, SOLUTION SUBCUTANEOUS at 22:18

## 2018-05-13 RX ADMIN — Medication 650 MILLIGRAM(S): at 17:21

## 2018-05-13 RX ADMIN — HEPARIN SODIUM 5000 UNIT(S): 5000 INJECTION INTRAVENOUS; SUBCUTANEOUS at 17:22

## 2018-05-13 NOTE — PROGRESS NOTE ADULT - PROBLEM SELECTOR PROBLEM 4
Chest pain, unspecified type

## 2018-05-13 NOTE — PROGRESS NOTE ADULT - PROBLEM SELECTOR PLAN 8
DVT ppx - SCDs, Hold heparin 2/2 procedure in AM,  Diet - consistent carb  PPI  Activity - out of bed with assistance   Fall Precautions    IMPROVE VTE Individual Risk Assessment          RISK                                                          Points  [  ] Previous VTE                                                3  [  ] Thrombophilia                                             2  [  ] Lower limb paralysis                                   2        (unable to hold up >15 seconds)    [  ] Current Cancer                                             2         (within 6 months)  [  ] Immobilization > 24 hrs                              1  [  ] ICU/CCU stay > 24 hours                             1  [ x ] Age > 60                                                         1    IMPROVE VTE Score:
DVT ppx - SCDs, Hold heparin 2/2 procedure in AM,  Diet - consistent carb  Activity - out of bed with assistance   Fall Precautions    IMPROVE VTE Individual Risk Assessment          RISK                                                          Points  [  ] Previous VTE                                                3  [  ] Thrombophilia                                             2  [  ] Lower limb paralysis                                   2        (unable to hold up >15 seconds)    [  ] Current Cancer                                             2         (within 6 months)  [  ] Immobilization > 24 hrs                              1  [  ] ICU/CCU stay > 24 hours                             1  [ x ] Age > 60                                                         1    IMPROVE VTE Score:
DVT ppx - SCDs, Hold heparin 2/2 procedure in AM,  Diet - consistent carb  PPI  Activity - out of bed with assistance   Fall Precautions    IMPROVE VTE Individual Risk Assessment          RISK                                                          Points  [  ] Previous VTE                                                3  [  ] Thrombophilia                                             2  [  ] Lower limb paralysis                                   2        (unable to hold up >15 seconds)    [  ] Current Cancer                                             2         (within 6 months)  [  ] Immobilization > 24 hrs                              1  [  ] ICU/CCU stay > 24 hours                             1  [ x ] Age > 60                                                         1    IMPROVE VTE Score:
DVT ppx - Heparin  Diet - consistent carb  Activity - out of bed with assistance   Fall Precautions    IMPROVE VTE Individual Risk Assessment          RISK                                                          Points  [  ] Previous VTE                                                3  [  ] Thrombophilia                                             2  [  ] Lower limb paralysis                                   2        (unable to hold up >15 seconds)    [  ] Current Cancer                                             2         (within 6 months)  [  ] Immobilization > 24 hrs                              1  [  ] ICU/CCU stay > 24 hours                             1  [ x ] Age > 60                                                         1    IMPROVE VTE Score:
DVT ppx - SCDs, Hold heparin 2/2 procedure in AM,  Diet - consistent carb  Activity - out of bed with assistance   Fall Precautions    IMPROVE VTE Individual Risk Assessment          RISK                                                          Points  [  ] Previous VTE                                                3  [  ] Thrombophilia                                             2  [  ] Lower limb paralysis                                   2        (unable to hold up >15 seconds)    [  ] Current Cancer                                             2         (within 6 months)  [  ] Immobilization > 24 hrs                              1  [  ] ICU/CCU stay > 24 hours                             1  [ x ] Age > 60                                                         1    IMPROVE VTE Score:
DVT ppx - SCDs, Hold heparin 2/2 procedure in AM,  Diet - consistent carb  PPI  Activity - out of bed with assistance   Fall Precautions    IMPROVE VTE Individual Risk Assessment          RISK                                                          Points  [  ] Previous VTE                                                3  [  ] Thrombophilia                                             2  [  ] Lower limb paralysis                                   2        (unable to hold up >15 seconds)    [  ] Current Cancer                                             2         (within 6 months)  [  ] Immobilization > 24 hrs                              1  [  ] ICU/CCU stay > 24 hours                             1  [ x ] Age > 60                                                         1    IMPROVE VTE Score:

## 2018-05-13 NOTE — PROGRESS NOTE ADULT - PROBLEM SELECTOR PLAN 1
Improving  jerri on CKD5  s/p R permacath   Patient had right IJ  temporary cath placed 5/8, s/p HD x 2.   aspirin and plavix restartd  likely dialysis tomorrow  s/p kayexalate and Albuterol for hypokalemia  Dr. Albarran (Renal) recs apprec

## 2018-05-13 NOTE — PROGRESS NOTE ADULT - PROBLEM SELECTOR PROBLEM 3
Shortness of breath

## 2018-05-13 NOTE — PROGRESS NOTE ADULT - PROBLEM SELECTOR PROBLEM 1
Renal failure

## 2018-05-13 NOTE — PROGRESS NOTE ADULT - NSHPATTENDINGPLANDISCUSS_GEN_ALL_CORE
pt and daughter in law at bedside, as well as daughter with patient permission
pt and Dr Albarran about her plan of percath on Friday and HD.
pt and Dr Davis about trop elevation
pt and Dr James about her ESRD
pt and pt's family at bedside with pt permission
pt and Dr Paniagua about her plan of permcath and HD.
pt and Dr anderson about her permcath plan at am.
pt and Dr anderson about HD.
pt and Dr anderson about her ESRD and her need for HD.
pt / nurse / family

## 2018-05-13 NOTE — PROGRESS NOTE ADULT - PROBLEM SELECTOR PROBLEM 7
CAD (coronary artery disease)

## 2018-05-13 NOTE — PROGRESS NOTE ADULT - SUBJECTIVE AND OBJECTIVE BOX
Patient is a 68y old  Female who presents with a chief complaint of SOB, mild chest pain (08 May 2018 12:20)      INTERVAL HPI: Pt seen and examined. Pt prefers that daughter in law interpretes as opposed to the  phone, pt continues to have some neck soreness around permacath site and feels giddiness/anxious. Denies any other acute complaints. Has not asked for pain meds.     OVERNIGHT EVENTS: none noted  T(F): 98.5 (05-13-18 @ 20:25), Max: 99 (05-13-18 @ 00:29)  HR: 65 (05-13-18 @ 20:25) (63 - 70)  BP: 146/73 (05-13-18 @ 20:25) (123/56 - 180/74)  RR: 19 (05-13-18 @ 20:25) (17 - 19)  SpO2: 98% (05-13-18 @ 20:25) (96% - 98%)  I&O's Summary    13 May 2018 07:01  -  13 May 2018 21:36  --------------------------------------------------------  IN: 400 mL / OUT: 0 mL / NET: 400 mL        REVIEW OF SYSTEMS: 12 systems noncontributory other than that stated in hpi    PHYSICAL EXAM:  GENERAL: NAD, elder, obese  HEAD:  Atraumatic, Normocephalic  EYES: EOMI, PERRLA, conjunctiva and sclera clear  ENMT: No tonsillar erythema, exudates, or enlargement; Moist mucous membranes, Good dentition, No lesions  NECK: Supple, No JVD, Normal thyroid  NERVOUS SYSTEM:  Alert & Oriented X3, Good concentration; Motor Strength 5/5 B/L upper and lower extremities; DTRs 2+ intact and symmetric  CHEST/LUNG: Clear to percussion bilaterally; No rales, rhonchi, wheezing, or rubs  HEART: Regular rate and rhythm; No murmurs, rubs, or gallops  ABDOMEN: Soft, Nontender, Nondistended; Bowel sounds present  EXTREMITIES:  2+ Peripheral Pulses, No clubbing, cyanosis, or edema  SKIN: permacath in right upper chest dressing c/d/i, tender around area of cath but no erythema nor skin changes    LABS:                        8.2    11.5  )-----------( 247      ( 13 May 2018 07:28 )             26.8     05-13    140  |  101  |  37<H>  ----------------------------<  102<H>  4.2   |  29  |  4.30<H>    Ca    8.8      13 May 2018 07:28    TPro  7.1  /  Alb  2.7<L>  /  TBili  0.3  /  DBili  x   /  AST  47<H>  /  ALT  49  /  AlkPhos  252<H>  05-13        CAPILLARY BLOOD GLUCOSE      POCT Blood Glucose.: 256 mg/dL (13 May 2018 16:37)  POCT Blood Glucose.: 176 mg/dL (13 May 2018 12:03)  POCT Blood Glucose.: 114 mg/dL (13 May 2018 08:02)  POCT Blood Glucose.: 159 mg/dL (12 May 2018 22:04)              MEDICATIONS  (STANDING):  acetaminophen   Tablet. 650 milliGRAM(s) Oral every 6 hours  aspirin enteric coated 81 milliGRAM(s) Oral daily  atorvastatin 20 milliGRAM(s) Oral at bedtime  clopidogrel Tablet 75 milliGRAM(s) Oral daily  dextrose 5%. 1000 milliLiter(s) (50 mL/Hr) IV Continuous <Continuous>  dextrose 50% Injectable 12.5 Gram(s) IV Push once  dextrose 50% Injectable 25 Gram(s) IV Push once  dextrose 50% Injectable 25 Gram(s) IV Push once  docusate sodium 100 milliGRAM(s) Oral two times a day  epoetin analilia Injectable 00594 Unit(s) IV Push <User Schedule>  gabapentin 300 milliGRAM(s) Oral two times a day  heparin  Injectable 5000 Unit(s) SubCutaneous every 12 hours  insulin glargine Injectable (LANTUS) 8 Unit(s) SubCutaneous at bedtime  insulin glargine Injectable (LANTUS) 26 Unit(s) SubCutaneous every morning  insulin lispro (HumaLOG) corrective regimen sliding scale   SubCutaneous three times a day before meals  isosorbide   mononitrate ER Tablet (IMDUR) 30 milliGRAM(s) Oral daily  metoprolol tartrate 50 milliGRAM(s) Oral two times a day  pantoprazole    Tablet 40 milliGRAM(s) Oral before breakfast  senna 2 Tablet(s) Oral at bedtime    MEDICATIONS  (PRN):  dextrose 40% Gel 15 Gram(s) Oral once PRN Blood Glucose LESS THAN 70 milliGRAM(s)/deciliter  glucagon  Injectable 1 milliGRAM(s) IntraMuscular once PRN Glucose LESS THAN 70 milligrams/deciliter  simethicone 80 milliGRAM(s) Chew daily PRN Heartburn

## 2018-05-13 NOTE — PROGRESS NOTE ADULT - ATTENDING COMMENTS
dialysis tomorrow and poss dc after dialysis, apprec sw/cm on assuring slot for dialysis outpt so pt does not miss any sessions, montior permacath placement with dialysis and functionality, tylenol standing for pain and one time low dose ativan for anxiety

## 2018-05-13 NOTE — PROGRESS NOTE ADULT - ASSESSMENT
The patient is a 68 year old female with a history of HTN, DM, CAD s/p PCI in 2007, ESRD not on HD who presents with chest pain and shortness of breath.    Plan:  - Chest pain likely musculoskeletal in etiology given reproducibility of pain. Currently pain free.  - Troponin mildly elevated at 0.159 and trended down. Likely due to acute diastolic heart failure and retention of enzymes from ESRD. No need to trend further.  - Echocardiogram with low normal LV systolic function, no significant valve issues.  - Continue metoprolol and isosorbide mononitrate  - Continue aspirin 81 mg daily and clopidogrel 75 mg daily  - Renal follow-up. HD as per renal.  - Discharge planning

## 2018-05-13 NOTE — PROGRESS NOTE ADULT - PROBLEM SELECTOR PROBLEM 2
Anemia, unspecified type

## 2018-05-13 NOTE — PROGRESS NOTE ADULT - PROBLEM SELECTOR PROBLEM 5
Hypertension

## 2018-05-13 NOTE — PROGRESS NOTE ADULT - ASSESSMENT
68F PMHx CAD (3 stents 2007), DM type 2 on insulin, Peripheral neuropathy, HTN transferred from [place] ED for hemodialysis due to electrolyte abnormalities, shortness of breath sec to acute on chr diastolic chf , chest pain.Patient had right IJ  temporary cath placed 5/8, s/p HD x 2. s/p permacath, as per SW and family awaiting for slot for outpt dialysis

## 2018-05-13 NOTE — PROGRESS NOTE ADULT - SUBJECTIVE AND OBJECTIVE BOX
Chief Complaint: Chest pain, shortness of breath    Interval Events: No events overnight. No complaints.    Review of Systems:  General: No fevers, chills, weight loss or gain  Skin: No rashes, color changes  Cardiovascular: No chest pain, orthopnea  Respiratory: No shortness of breath, cough  Gastrointestinal: No nausea, abdominal pain  Genitourinary: No incontinence, pain with urination  Musculoskeletal: No pain, swelling, decreased range of motion  Neurological: No headache, weakness  Psychiatric: No depression, anxiety  Endocrine: No weight loss or gain, increased thirst  All other systems are comprehensively negative.    Physical Exam:  Vital Signs Last 24 Hrs  T(C): 36.9 (13 May 2018 07:30), Max: 37.2 (13 May 2018 00:29)  T(F): 98.5 (13 May 2018 07:30), Max: 99 (13 May 2018 00:29)  HR: 63 (13 May 2018 07:30) (63 - 69)  BP: 123/56 (13 May 2018 07:30) (123/56 - 158/84)  BP(mean): --  RR: 17 (13 May 2018 07:30) (15 - 18)  SpO2: 96% (13 May 2018 07:30) (96% - 100%)  General: NAD  HEENT: MMM  Neck: No JVD, no carotid bruit  Lungs: CTAB  CV: RRR, nl S1/S2, no M/R/G  Abdomen: S/NT/ND, +BS  Extremities: No LE edema, no cyanosis  Neuro: AAOx3, non-focal  Skin: No rash    Labs:             05-13    140  |  101  |  37<H>  ----------------------------<  102<H>  4.2   |  29  |  4.30<H>    Ca    8.8      13 May 2018 07:28    TPro  7.1  /  Alb  2.7<L>  /  TBili  0.3  /  DBili  x   /  AST  47<H>  /  ALT  49  /  AlkPhos  252<H>  05-13                        8.2    13.3  )-----------( 244      ( 12 May 2018 06:31 )             27.0           Telemetry: Sinus rhythm

## 2018-05-14 VITALS
TEMPERATURE: 98 F | HEART RATE: 70 BPM | DIASTOLIC BLOOD PRESSURE: 71 MMHG | RESPIRATION RATE: 15 BRPM | OXYGEN SATURATION: 99 % | SYSTOLIC BLOOD PRESSURE: 154 MMHG

## 2018-05-14 LAB
ANION GAP SERPL CALC-SCNC: 11 MMOL/L — SIGNIFICANT CHANGE UP (ref 5–17)
BUN SERPL-MCNC: 54 MG/DL — HIGH (ref 7–23)
CALCIUM SERPL-MCNC: 8.9 MG/DL — SIGNIFICANT CHANGE UP (ref 8.5–10.1)
CHLORIDE SERPL-SCNC: 101 MMOL/L — SIGNIFICANT CHANGE UP (ref 96–108)
CO2 SERPL-SCNC: 27 MMOL/L — SIGNIFICANT CHANGE UP (ref 22–31)
CREAT SERPL-MCNC: 4.9 MG/DL — HIGH (ref 0.5–1.3)
GLUCOSE SERPL-MCNC: 153 MG/DL — HIGH (ref 70–99)
HCT VFR BLD CALC: 26.6 % — LOW (ref 34.5–45)
HGB BLD-MCNC: 8.2 G/DL — LOW (ref 11.5–15.5)
MCHC RBC-ENTMCNC: 26.8 PG — LOW (ref 27–34)
MCHC RBC-ENTMCNC: 30.9 GM/DL — LOW (ref 32–36)
MCV RBC AUTO: 86.5 FL — SIGNIFICANT CHANGE UP (ref 80–100)
PLATELET # BLD AUTO: 247 K/UL — SIGNIFICANT CHANGE UP (ref 150–400)
POTASSIUM SERPL-MCNC: 4.4 MMOL/L — SIGNIFICANT CHANGE UP (ref 3.5–5.3)
POTASSIUM SERPL-SCNC: 4.4 MMOL/L — SIGNIFICANT CHANGE UP (ref 3.5–5.3)
RBC # BLD: 3.08 M/UL — LOW (ref 3.8–5.2)
RBC # FLD: 16.4 % — HIGH (ref 10.3–14.5)
SODIUM SERPL-SCNC: 139 MMOL/L — SIGNIFICANT CHANGE UP (ref 135–145)
WBC # BLD: 9.2 K/UL — SIGNIFICANT CHANGE UP (ref 3.8–10.5)
WBC # FLD AUTO: 9.2 K/UL — SIGNIFICANT CHANGE UP (ref 3.8–10.5)

## 2018-05-14 PROCEDURE — 94640 AIRWAY INHALATION TREATMENT: CPT

## 2018-05-14 PROCEDURE — 82550 ASSAY OF CK (CPK): CPT

## 2018-05-14 PROCEDURE — 82310 ASSAY OF CALCIUM: CPT

## 2018-05-14 PROCEDURE — 86850 RBC ANTIBODY SCREEN: CPT

## 2018-05-14 PROCEDURE — 96374 THER/PROPH/DIAG INJ IV PUSH: CPT

## 2018-05-14 PROCEDURE — 80048 BASIC METABOLIC PNL TOTAL CA: CPT

## 2018-05-14 PROCEDURE — 84100 ASSAY OF PHOSPHORUS: CPT

## 2018-05-14 PROCEDURE — 82553 CREATINE MB FRACTION: CPT

## 2018-05-14 PROCEDURE — 84155 ASSAY OF PROTEIN SERUM: CPT

## 2018-05-14 PROCEDURE — 93005 ELECTROCARDIOGRAM TRACING: CPT

## 2018-05-14 PROCEDURE — 86334 IMMUNOFIX E-PHORESIS SERUM: CPT

## 2018-05-14 PROCEDURE — 86901 BLOOD TYPING SEROLOGIC RH(D): CPT

## 2018-05-14 PROCEDURE — 99285 EMERGENCY DEPT VISIT HI MDM: CPT | Mod: 25

## 2018-05-14 PROCEDURE — 96372 THER/PROPH/DIAG INJ SC/IM: CPT | Mod: 59

## 2018-05-14 PROCEDURE — 80069 RENAL FUNCTION PANEL: CPT

## 2018-05-14 PROCEDURE — 83970 ASSAY OF PARATHORMONE: CPT

## 2018-05-14 PROCEDURE — 85014 HEMATOCRIT: CPT

## 2018-05-14 PROCEDURE — 85018 HEMOGLOBIN: CPT

## 2018-05-14 PROCEDURE — 76937 US GUIDE VASCULAR ACCESS: CPT

## 2018-05-14 PROCEDURE — 77001 FLUOROGUIDE FOR VEIN DEVICE: CPT

## 2018-05-14 PROCEDURE — 76775 US EXAM ABDO BACK WALL LIM: CPT

## 2018-05-14 PROCEDURE — 96375 TX/PRO/DX INJ NEW DRUG ADDON: CPT

## 2018-05-14 PROCEDURE — 36558 INSERT TUNNELED CV CATH: CPT

## 2018-05-14 PROCEDURE — 83550 IRON BINDING TEST: CPT

## 2018-05-14 PROCEDURE — 97530 THERAPEUTIC ACTIVITIES: CPT

## 2018-05-14 PROCEDURE — 84550 ASSAY OF BLOOD/URIC ACID: CPT

## 2018-05-14 PROCEDURE — 82962 GLUCOSE BLOOD TEST: CPT

## 2018-05-14 PROCEDURE — 83036 HEMOGLOBIN GLYCOSYLATED A1C: CPT

## 2018-05-14 PROCEDURE — 80053 COMPREHEN METABOLIC PANEL: CPT

## 2018-05-14 PROCEDURE — 86706 HEP B SURFACE ANTIBODY: CPT

## 2018-05-14 PROCEDURE — 80074 ACUTE HEPATITIS PANEL: CPT

## 2018-05-14 PROCEDURE — C1752: CPT

## 2018-05-14 PROCEDURE — C1750: CPT

## 2018-05-14 PROCEDURE — 85027 COMPLETE CBC AUTOMATED: CPT

## 2018-05-14 PROCEDURE — 36556 INSERT NON-TUNNEL CV CATH: CPT

## 2018-05-14 PROCEDURE — 86900 BLOOD TYPING SEROLOGIC ABO: CPT

## 2018-05-14 PROCEDURE — 99239 HOSP IP/OBS DSCHRG MGMT >30: CPT

## 2018-05-14 PROCEDURE — 97162 PT EVAL MOD COMPLEX 30 MIN: CPT

## 2018-05-14 PROCEDURE — 85610 PROTHROMBIN TIME: CPT

## 2018-05-14 PROCEDURE — 84165 PROTEIN E-PHORESIS SERUM: CPT

## 2018-05-14 PROCEDURE — 97116 GAIT TRAINING THERAPY: CPT

## 2018-05-14 PROCEDURE — 82728 ASSAY OF FERRITIN: CPT

## 2018-05-14 PROCEDURE — 84145 PROCALCITONIN (PCT): CPT

## 2018-05-14 PROCEDURE — C1894: CPT

## 2018-05-14 PROCEDURE — 99261: CPT

## 2018-05-14 PROCEDURE — 84484 ASSAY OF TROPONIN QUANT: CPT

## 2018-05-14 PROCEDURE — 93306 TTE W/DOPPLER COMPLETE: CPT

## 2018-05-14 RX ORDER — INSULIN GLARGINE 100 [IU]/ML
10 INJECTION, SOLUTION SUBCUTANEOUS
Qty: 0 | Refills: 0 | COMMUNITY
Start: 2018-05-14

## 2018-05-14 RX ORDER — ENOXAPARIN SODIUM 100 MG/ML
24 INJECTION SUBCUTANEOUS
Qty: 1 | Refills: 0 | OUTPATIENT
Start: 2018-05-14 | End: 2018-06-12

## 2018-05-14 RX ORDER — FUROSEMIDE 40 MG
1 TABLET ORAL
Qty: 0 | Refills: 0 | COMMUNITY

## 2018-05-14 RX ORDER — LOSARTAN POTASSIUM 100 MG/1
1 TABLET, FILM COATED ORAL
Qty: 0 | Refills: 0 | COMMUNITY

## 2018-05-14 RX ORDER — INSULIN GLARGINE 100 [IU]/ML
30 INJECTION, SOLUTION SUBCUTANEOUS
Qty: 0 | Refills: 0 | COMMUNITY

## 2018-05-14 RX ORDER — INSULIN ASPART 100 [IU]/ML
10 INJECTION, SOLUTION SUBCUTANEOUS
Qty: 0 | Refills: 0 | COMMUNITY

## 2018-05-14 RX ORDER — INSULIN LISPRO 100/ML
4 VIAL (ML) SUBCUTANEOUS
Qty: 0 | Refills: 0 | Status: DISCONTINUED | OUTPATIENT
Start: 2018-05-14 | End: 2018-05-14

## 2018-05-14 RX ORDER — INSULIN LISPRO 100/ML
VIAL (ML) SUBCUTANEOUS
Qty: 0 | Refills: 0 | Status: DISCONTINUED | OUTPATIENT
Start: 2018-05-14 | End: 2018-05-14

## 2018-05-14 RX ORDER — INSULIN LISPRO 100/ML
4 VIAL (ML) SUBCUTANEOUS
Qty: 1 | Refills: 0 | OUTPATIENT
Start: 2018-05-14 | End: 2018-06-12

## 2018-05-14 RX ADMIN — Medication 650 MILLIGRAM(S): at 01:00

## 2018-05-14 RX ADMIN — Medication 650 MILLIGRAM(S): at 05:34

## 2018-05-14 RX ADMIN — GABAPENTIN 300 MILLIGRAM(S): 400 CAPSULE ORAL at 17:45

## 2018-05-14 RX ADMIN — Medication 4 UNIT(S): at 17:45

## 2018-05-14 RX ADMIN — ERYTHROPOIETIN 10000 UNIT(S): 10000 INJECTION, SOLUTION INTRAVENOUS; SUBCUTANEOUS at 15:05

## 2018-05-14 RX ADMIN — Medication 100 MILLIGRAM(S): at 05:33

## 2018-05-14 RX ADMIN — Medication 50 MILLIGRAM(S): at 17:45

## 2018-05-14 RX ADMIN — HEPARIN SODIUM 5000 UNIT(S): 5000 INJECTION INTRAVENOUS; SUBCUTANEOUS at 05:33

## 2018-05-14 RX ADMIN — Medication 650 MILLIGRAM(S): at 15:45

## 2018-05-14 RX ADMIN — Medication 650 MILLIGRAM(S): at 00:25

## 2018-05-14 RX ADMIN — GABAPENTIN 300 MILLIGRAM(S): 400 CAPSULE ORAL at 05:33

## 2018-05-14 RX ADMIN — Medication 650 MILLIGRAM(S): at 16:32

## 2018-05-14 RX ADMIN — Medication 4 UNIT(S): at 12:57

## 2018-05-14 RX ADMIN — Medication 81 MILLIGRAM(S): at 15:46

## 2018-05-14 RX ADMIN — CLOPIDOGREL BISULFATE 75 MILLIGRAM(S): 75 TABLET, FILM COATED ORAL at 15:45

## 2018-05-14 RX ADMIN — PANTOPRAZOLE SODIUM 40 MILLIGRAM(S): 20 TABLET, DELAYED RELEASE ORAL at 05:33

## 2018-05-14 RX ADMIN — INSULIN GLARGINE 26 UNIT(S): 100 INJECTION, SOLUTION SUBCUTANEOUS at 09:05

## 2018-05-14 RX ADMIN — ISOSORBIDE MONONITRATE 30 MILLIGRAM(S): 60 TABLET, EXTENDED RELEASE ORAL at 16:33

## 2018-05-14 RX ADMIN — HEPARIN SODIUM 5000 UNIT(S): 5000 INJECTION INTRAVENOUS; SUBCUTANEOUS at 17:45

## 2018-05-14 RX ADMIN — Medication 50 MILLIGRAM(S): at 05:33

## 2018-05-14 NOTE — PROGRESS NOTE ADULT - SUBJECTIVE AND OBJECTIVE BOX
Patient is a 68y old  Female who presents with a chief complaint of SOB, mild chest pain (04 May 2018 12:48)      Patient seen in follow up for CKD 5, fluid overload. HD today.    PAST MEDICAL HISTORY:  CAD (coronary artery disease)  Diabetes  CRF (chronic renal failure)  Hypertension  Pneumonia  Myocardial infarction  Hypertension  Diabetes    MEDICATIONS  (STANDING):  acetaminophen   Tablet. 650 milliGRAM(s) Oral every 6 hours  aspirin enteric coated 81 milliGRAM(s) Oral daily  atorvastatin 20 milliGRAM(s) Oral at bedtime  clopidogrel Tablet 75 milliGRAM(s) Oral daily  dextrose 5%. 1000 milliLiter(s) (50 mL/Hr) IV Continuous <Continuous>  dextrose 50% Injectable 12.5 Gram(s) IV Push once  dextrose 50% Injectable 25 Gram(s) IV Push once  dextrose 50% Injectable 25 Gram(s) IV Push once  epoetin analilia Injectable 08771 Unit(s) IV Push <User Schedule>  gabapentin 300 milliGRAM(s) Oral two times a day  heparin  Injectable 5000 Unit(s) SubCutaneous every 12 hours  insulin glargine Injectable (LANTUS) 26 Unit(s) SubCutaneous every morning  insulin lispro (HumaLOG) corrective regimen sliding scale   SubCutaneous three times a day before meals  insulin lispro Injectable (HumaLOG) 4 Unit(s) SubCutaneous three times a day before meals  isosorbide   mononitrate ER Tablet (IMDUR) 30 milliGRAM(s) Oral daily  metoprolol tartrate 50 milliGRAM(s) Oral two times a day  pantoprazole    Tablet 40 milliGRAM(s) Oral before breakfast    MEDICATIONS  (PRN):  dextrose 40% Gel 15 Gram(s) Oral once PRN Blood Glucose LESS THAN 70 milliGRAM(s)/deciliter  glucagon  Injectable 1 milliGRAM(s) IntraMuscular once PRN Glucose LESS THAN 70 milligrams/deciliter  simethicone 80 milliGRAM(s) Chew daily PRN Heartburn    T(C): 36.6 (05-14-18 @ 07:09), Max: 37.2 (05-13-18 @ 00:29)  HR: 60 (05-14-18 @ 07:09) (60 - 70)  BP: 155/70 (05-14-18 @ 07:09) (123/56 - 180/74)  RR: 14 (05-14-18 @ 07:09)  SpO2: 99% (05-14-18 @ 07:09)  Wt(kg): --  I&O's Detail    13 May 2018 07:01  -  14 May 2018 07:00  --------------------------------------------------------  IN:    Oral Fluid: 400 mL  Total IN: 400 mL    OUT:  Total OUT: 0 mL    Total NET: 400 mL        PHYSICAL EXAM:  General: NAD  Respiratory: b/l air entry  Cardiovascular: S1 S2  Gastrointestinal: soft  Extremities:  + edema      LABORATORY:                        8.2    9.2   )-----------( 247      ( 14 May 2018 07:23 )             26.6     05-14    139  |  101  |  54<H>  ----------------------------<  153<H>  4.4   |  27  |  4.90<H>    Ca    8.9      14 May 2018 07:23    TPro  7.1  /  Alb  2.7<L>  /  TBili  0.3  /  DBili  x   /  AST  47<H>  /  ALT  49  /  AlkPhos  252<H>  05-13    Sodium, Serum: 139 mmol/L (05-14 @ 07:23)  Sodium, Serum: 140 mmol/L (05-13 @ 07:28)    Potassium, Serum: 4.4 mmol/L (05-14 @ 07:23)  Potassium, Serum: 4.2 mmol/L (05-13 @ 07:28)    Hemoglobin: 8.2 g/dL (05-14 @ 07:23)  Hemoglobin: 8.2 g/dL (05-13 @ 07:28)  Hemoglobin: 8.2 g/dL (05-12 @ 06:31)    Creatinine, Serum 4.90 (05-14 @ 07:23)  Creatinine, Serum 4.30 (05-13 @ 07:28)  Creatinine, Serum 3.50 (05-12 @ 06:31)        LIVER FUNCTIONS - ( 13 May 2018 07:28 )  Alb: 2.7 g/dL / Pro: 7.1 g/dL / ALK PHOS: 252 U/L / ALT: 49 U/L / AST: 47 U/L / GGT: x

## 2018-05-14 NOTE — CONSULT NOTE ADULT - SUBJECTIVE AND OBJECTIVE BOX
Patient is a 68y old  Female who presents with a chief complaint of SOB, mild chest pain (08 May 2018 12:20)      Reason For Consult: dm2 uncontrolled    HPI:  68F PMHx CAD (3 stents 2007), DM type 2 on insulin, Peripheral neuropathy, HTN, CKD now ESRD transferred from [place] ED for HD. Presented to [place] ED with progressive SOB and CP x 2 days, mildly relieved by Nitroglycerin. Patient's grandson provided translation. Per patient, she has had progressive SOB and mild cp over the past week. She has no prior history of a similar complaint. The SOB is made worse with activity and lying flat at night. She describes the chest pain and a dull pain without radiation. It is rated 3/10. She denies recent travel or sick contacts. Per grandson, patient has been told by nephrologist she needs dialysis but has refused; however consents to dialysis today. Patients admits to fatigue, lightheadedness, cp, palpitations, SOB, lower extremity edema. She denies fever, chills, N/V/C/D, abdominal pain, numbness/tingling in digits.     In the [place] ED Hg 7.5, K+ 5.8, Cr 4.88, lactate 0.5, proBNP 7456, trop negative x1.  UA grossly negative, RVP negative, CXR showing increased pulmonary vascular congestion. Dr. Mena (Cardio) consulted, CP likely MSK and MI to be ruled out with trops x2. Given , Lasix 40 x2, Nitro. Dr. Albarran (renal) paged.    Upon arrival to [place] /80, T 98.5, HR 62, RR 16, SpO2 100% on O2, given Albuterol and Kayexalate. (03 May 2018 23:24)      PAST MEDICAL & SURGICAL HISTORY:  CAD (coronary artery disease)  Diabetes  CRF (chronic renal failure)  Hypertension  Pneumonia  Myocardial infarction  Hypertension  Diabetes  H/O: hysterectomy      FAMILY HISTORY:  No pertinent family history in first degree relatives        Social History:    MEDICATIONS  (STANDING):  acetaminophen   Tablet. 650 milliGRAM(s) Oral every 6 hours  aspirin enteric coated 81 milliGRAM(s) Oral daily  atorvastatin 20 milliGRAM(s) Oral at bedtime  clopidogrel Tablet 75 milliGRAM(s) Oral daily  dextrose 5%. 1000 milliLiter(s) (50 mL/Hr) IV Continuous <Continuous>  dextrose 50% Injectable 12.5 Gram(s) IV Push once  dextrose 50% Injectable 25 Gram(s) IV Push once  dextrose 50% Injectable 25 Gram(s) IV Push once  docusate sodium 100 milliGRAM(s) Oral two times a day  epoetin analilia Injectable 67893 Unit(s) IV Push <User Schedule>  gabapentin 300 milliGRAM(s) Oral two times a day  heparin  Injectable 5000 Unit(s) SubCutaneous every 12 hours  insulin glargine Injectable (LANTUS) 8 Unit(s) SubCutaneous at bedtime  insulin glargine Injectable (LANTUS) 26 Unit(s) SubCutaneous every morning  insulin lispro (HumaLOG) corrective regimen sliding scale   SubCutaneous three times a day before meals  isosorbide   mononitrate ER Tablet (IMDUR) 30 milliGRAM(s) Oral daily  metoprolol tartrate 50 milliGRAM(s) Oral two times a day  pantoprazole    Tablet 40 milliGRAM(s) Oral before breakfast  senna 2 Tablet(s) Oral at bedtime    MEDICATIONS  (PRN):  dextrose 40% Gel 15 Gram(s) Oral once PRN Blood Glucose LESS THAN 70 milliGRAM(s)/deciliter  glucagon  Injectable 1 milliGRAM(s) IntraMuscular once PRN Glucose LESS THAN 70 milligrams/deciliter  simethicone 80 milliGRAM(s) Chew daily PRN Heartburn        T(C): 36.6 (05-14-18 @ 07:09), Max: 36.9 (05-13-18 @ 20:25)  HR: 60 (05-14-18 @ 07:09) (60 - 70)  BP: 155/70 (05-14-18 @ 07:09) (127/73 - 180/74)  RR: 14 (05-14-18 @ 07:09) (14 - 19)  SpO2: 99% (05-14-18 @ 07:09) (97% - 99%)  Wt(kg): --    PHYSICAL EXAM:  GENERAL: NAD, well-groomed, well-developed  HEAD:  Atraumatic, Normocephalic  NECK: Supple, No JVD, Normal thyroid  CHEST/LUNG: Clear to percussion bilaterally; No rales, rhonchi, wheezing, or rubs  HEART: Regular rate and rhythm; No murmurs, rubs, or gallops  ABDOMEN: Soft, Nontender, Nondistended; Bowel sounds present  EXTREMITIES:  2+ Peripheral Pulses, No clubbing, cyanosis, or edema  SKIN: No rashes or lesions    CAPILLARY BLOOD GLUCOSE      POCT Blood Glucose.: 306 mg/dL (13 May 2018 21:50)  POCT Blood Glucose.: 256 mg/dL (13 May 2018 16:37)  POCT Blood Glucose.: 176 mg/dL (13 May 2018 12:03)  POCT Blood Glucose.: 114 mg/dL (13 May 2018 08:02)                            8.2    11.5  )-----------( 247      ( 13 May 2018 07:28 )             26.8       CMP:  05-13 @ 07:28  SGPT 49  Albumin 2.7   Alk Phos 252   Anion Gap 10   SGOT 47   Total Bili 0.3   BUN 37   Calcium Total 8.8   CO2 29   Chloride 101   Creatinine 4.30   eGFR if AA 11   eGFR if non AA 10   Glucose 102   Potassium 4.2   Protein 7.1   Sodium 140      Thyroid Function Tests:      Diabetes Tests:       Radiology:
Patient is a 68y old  Female transferred from [facility]  who presents with a chief complaint of SOB.  Grandson reports she has been dyspneic for 2 days and worsening on exertion.  Pt reports of intermittent 3/10 midsternal chest discomfort described as pinching but w/o radiation, diaphoretic,, N/V, back pain, fever, or chills.  Pt w/ significant hx of CAD/MI/and stent in the past, CKD, DM, and HTN.  Hemodialysis has been offered in the past but she persistenly refuses. Pt was given lasix 80mg IVX1 in [facility].  Presently, pt appears comfortable, hypertensive , O2S 100% on nasal canula. No complaints.       PAST MEDICAL & SURGICAL HISTORY:  CAD (coronary artery disease)  Diabetes  CRF (chronic renal failure)  Hypertension  H/O: hysterectomy      BRIEF HOSPITAL COURSE:    Review of Systems:                                                    All other ROS are negative.    ICU Vital Signs Last 24 Hrs  T(C): 36.9 (03 May 2018 20:37), Max: 36.9 (03 May 2018 20:37)  T(F): 98.5 (03 May 2018 20:37), Max: 98.5 (03 May 2018 20:37)  HR: 62 (03 May 2018 20:37) (62 - 62)  BP: 175/80 (03 May 2018 20:37) (175/80 - 175/80)  BP(mean): --  ABP: --  ABP(mean): --  RR: 16 (03 May 2018 20:37) (16 - 16)  SpO2: 100% (03 May 2018 20:37) (100% - 100%)    Physical Examination:    General:  alert, awake, sitting up    HEENT: normacephalic, pupils equal and reactive, anicteric no jaundice    PULM:  fince cracles b/l bases     CVS: s1s2 RRR    ABD: soft +BS nt nd     EXT: non pitting edema     SKIN: no cyanosis     Neuro: alert, oriented, no focal deficits           LABS: as of [facility]   WBC 10  hgb 7.5  Hct 24.4  plat 229  Na 140  K 5.8  CO2 24   Bun 67  Cr 4.88  Trop .020  BNP 7456  LA .5                CAPILLARY BLOOD GLUCOSE              CULTURES:      Medications:                              RADIOLOGY/IMAGING/ECHO    Critical care point of care ultrasound: CXR increased vascular congestion     Assessment/Plan: 67yo F hx CAD/MI/and stent in the past, CKD, DM, and HTN adm w/ acute on CKD and CHF/fluid overload. pt hemodynamically stable.  No indication for ICU monitor at this time.  Denies any sob or chest pain.    CV-BP stable, s/p lasix, r/o MI,. serial trops, echo, Dr Rajesh horn at [place] appreciated  Pulm-stable on NC  Renal-GFR 8, s/p lasix 80ivX1 w/ reported good UO, Renal consult Dr Corbett  ID-observe off abx  heme-chronic anemia, no evidence of active bleed, close monitor    Spoke to grandson at bedside regarding recommendations.    Discussed w/ EICU and agrees w/ plan.        Critical Care time: 40min   (Reviewing data, imaging, discussing with multidisciplinary team, non inclusive of procedures, discussing goals of care with patient/family)
Patient is a 68y old  Female who presents with a chief complaint of SOB, mild chest pain (04 May 2018 12:48)    HPI:  68F PMHx CAD (3 stents 2007), DM type 2 on insulin, Peripheral neuropathy, HTN, CKD transferred from [place] ED for possible HD. Presented to [place] ED with progressive SOB and CP x 2 days, mildly relieved by Nitroglycerin. Patient's grandson provided translation. Per patient, she has had progressive SOB and mild cp over the past week. She has no prior history of a similar complaint. The SOB is made worse with activity and lying flat at night. She describes the chest pain and a dull pain without radiation. It is rated 3/10. She denies recent travel or sick contacts. Per grandson, patient has been told by nephrologist she needs dialysis but has refused; however consents to dialysis today. Patients admits to fatigue, lightheadedness, cp, palpitations, SOB, lower extremity edema. She denies fever, chills, N/V/C/D, abdominal pain, numbness/tingling in digits.     In the [place] ED Hg 7.5, K+ 5.8, Cr 4.88, lactate 0.5, proBNP 7456, trop negative x1.  UA grossly negative, RVP negative, CXR showing increased pulmonary vascular congestion. Dr. Mena (Cardio) consulted, CP likely MSK and MI to be ruled out with trops x2. Given , Lasix 40 x2, Nitro. Dr. Albarran (renal) paged.    Upon arrival to [place] /80, T 98.5, HR 62, RR 16, SpO2 100% on O2, given Albuterol and Kayexalate. (03 May 2018 23:24)    Renal consult called for worsening renal function, hyperkalemia. Pt was on losartan. History obtained from chart and patient.       PAST MEDICAL HISTORY:  CAD (coronary artery disease)  Diabetes  CRF (chronic renal failure)  Hypertension  Pneumonia  Myocardial infarction  Hypertension  Diabetes      PAST SURGICAL HISTORY:  H/O: hysterectomy      FAMILY HISTORY:  No pertinent family history in first degree relatives      SOCIAL HISTORY: No smoking or alcohol use     Allergies    No Known Allergies    Intolerances      Home Medications:  Aspirin Enteric Coated 81 mg oral delayed release tablet: 1 tab(s) orally once a day (04 May 2018 12:47)  calcium-vitamin D 500 mg-200 intl units oral tablet: 1 tab(s) orally once a day (04 May 2018 12:47)  clopidogrel 75 mg oral tablet: 1 tab(s) orally once a day (04 May 2018 12:47)  docusate: 100 milligram(s) orally once a day (04 May 2018 12:47)  ferrous sulfate 325 mg (65 mg elemental iron) oral tablet: 1 tab(s) orally 3 times a day (04 May 2018 12:47)  gabapentin 300 mg oral capsule: 1 cap(s) orally 2 times a day (04 May 2018 12:47)  isosorbide mononitrate 30 mg oral tablet, extended release: 2 tab(s) orally once a day (in the morning) (04 May 2018 12:47)  isosorbide mononitrate 30 mg oral tablet, extended release: 1 tab(s) orally once a day (in the evening) (04 May 2018 12:47)  Lantus 100 units/mL subcutaneous solution: 30 unit(s) subcutaneous once a day (in the morning) (04 May 2018 12:47)  Lasix 20 mg oral tablet: 1 tab(s) orally once a day (04 May 2018 12:47)  losartan 50 mg oral tablet: 1 tab(s) orally once a day (04 May 2018 12:47)  metoprolol tartrate 50 mg oral tablet: 1 tab(s) orally 2 times a day (04 May 2018 12:47)  NIFEdipine 60 mg oral tablet, extended release: 1 tab(s) orally once a day (04 May 2018 12:47)  NovoLOG FlexPen 100 units/mL injectable solution: 10 unit(s) injectable once a day (in the morning) (04 May 2018 12:47)  rosuvastatin 20 mg oral tablet: 1 tab(s) orally once a day (04 May 2018 12:47)    MEDICATIONS  (STANDING):  aspirin enteric coated 81 milliGRAM(s) Oral daily  atorvastatin 20 milliGRAM(s) Oral at bedtime  clopidogrel Tablet 75 milliGRAM(s) Oral daily  dextrose 5%. 1000 milliLiter(s) (50 mL/Hr) IV Continuous <Continuous>  dextrose 50% Injectable 12.5 Gram(s) IV Push once  dextrose 50% Injectable 25 Gram(s) IV Push once  dextrose 50% Injectable 25 Gram(s) IV Push once  furosemide   Injectable 40 milliGRAM(s) IV Push two times a day  gabapentin 300 milliGRAM(s) Oral two times a day  heparin  Injectable 5000 Unit(s) SubCutaneous every 12 hours  insulin glargine Injectable (LANTUS) 8 Unit(s) SubCutaneous at bedtime  insulin glargine Injectable (LANTUS) 26 Unit(s) SubCutaneous every morning  insulin lispro (HumaLOG) corrective regimen sliding scale   SubCutaneous three times a day before meals  isosorbide   dinitrate Tablet (ISORDIL) 30 milliGRAM(s) Oral three times a day  metoprolol tartrate 50 milliGRAM(s) Oral two times a day  NIFEdipine XL 60 milliGRAM(s) Oral daily    MEDICATIONS  (PRN):  dextrose Gel 1 Dose(s) Oral once PRN Blood Glucose LESS THAN 70 milliGRAM(s)/deciliter  glucagon  Injectable 1 milliGRAM(s) IntraMuscular once PRN Glucose LESS THAN 70 milligrams/deciliter      REVIEW OF SYSTEMS:  General: NAD  Respiratory: + SOB  Cardiovascular: No CP or Palpitations	  Gastrointestinal: No nausea, Vomiting. No diarrhea  Genitourinary: No urinary complaints	  Musculoskeletal: No leg swelling, No new rash or lesions	      T(F): 97.4 (18 @ 12:00), Max: 98.7 (18 @ 16:56)  HR: 72 (18 @ 12:00) (60 - 85)  BP: 197/78 (18 @ 12:00) (129/41 - 197/78)  RR: 15 (18 @ 12:00) (15 - 20)  SpO2: 91% (18 @ 14:11) (91% - 100%)  Wt(kg): --    PHYSICAL EXAM:  General: NAD  Respiratory: b/l air entry, basal rales  Cardiovascular: S1 S2  Gastrointestinal: soft  Extremities: edema            147<H>  |  113<H>  |  69<H>  ----------------------------<  112<H>  4.9   |  23  |  5.00<H>    Ca    9.3      04 May 2018 06:38    TPro  7.5  /  Alb  2.9<L>  /  TBili  0.3  /  DBili  x   /  AST  11<L>  /  ALT  12  /  AlkPhos  69                            7.4    10.3  )-----------( 220      ( 04 May 2018 06:38 )             23.9       Potassium, Serum: 4.9 mmol/L ( @ 06:38)  Blood Urea Nitrogen, Serum: 69 mg/dL ( @ 06:38)  Calcium, Total Serum: 9.3 mg/dL ( @ 06:38)  Hemoglobin: 7.4 g/dL ( @ 06:38)      Creatinine, Serum: 5.00 ( @ 06:38)  Creatinine, Serum: 5.00 ( @ 02:18)  Creatinine, Serum: 4.88 ( @ 17:53)      Urinalysis Basic - ( 03 May 2018 19:23 )    Color: Yellow / Appearance: Clear / S.010 / pH: x  Gluc: x / Ketone: Negative  / Bili: Negative / Urobili: Negative mg/dL   Blood: x / Protein: 100 mg/dL / Nitrite: Negative   Leuk Esterase: Negative / RBC: 3-5 /HPF / WBC 0-2   Sq Epi: x / Non Sq Epi: Few / Bacteria: Few      LIVER FUNCTIONS - ( 04 May 2018 06:38 )  Alb: 2.9 g/dL / Pro: 7.5 g/dL / ALK PHOS: 69 U/L / ALT: 12 U/L / AST: 11 U/L / GGT: x           CARDIAC MARKERS ( 04 May 2018 06:38 )  .129 ng/mL / x     / 48 U/L / x     / 2.3 ng/mL  CARDIAC MARKERS ( 04 May 2018 01:25 )  .159 ng/mL / x     / 52 U/L / x     / 2.3 ng/mL  CARDIAC MARKERS ( 03 May 2018 17:53 )  .020 ng/mL / x     / x     / x     / 1.0 ng/mL      Creatine Kinase, Serum: 48 U/L (18 @ 06:38)  Creatine Kinase, Serum: 52 U/L (18 @ 01:25)        < from: Xray Chest 1 View- PORTABLE-Urgent (18 @ 17:49) >  EXAM:  XR CHEST PORTABLE URGENT 1V                                  PROCEDURE DATE:  2018          INTERPRETATION:  Chest radiograph (one view)     CPT 07124    CLINICAL INFORMATION: Shortness of breath of unknown severity since today.    TECHNIQUE:  Single frontal view of the chest was obtained.    FINDINGS:  No previous examinations are available for review.    The lungs demonstrate increased pulmonary vascular congestion. No pleural   effusion is seen. The heart and mediastinum appear intact.           IMPRESSION: Increased pulmonary vascular congestion noted. No gross   consolidation is seen.           < end of copied text >

## 2018-05-14 NOTE — PROGRESS NOTE ADULT - SUBJECTIVE AND OBJECTIVE BOX
Chief Complaint: Chest pain, shortness of breath    Interval Events: No events overnight. Sleeping.    Review of Systems:  General: No fevers, chills, weight loss or gain  Skin: No rashes, color changes  Cardiovascular: No chest pain, orthopnea  Respiratory: No shortness of breath, cough  Gastrointestinal: No nausea, abdominal pain  Genitourinary: No incontinence, pain with urination  Musculoskeletal: No pain, swelling, decreased range of motion  Neurological: No headache, weakness  Psychiatric: No depression, anxiety  Endocrine: No weight loss or gain, increased thirst  All other systems are comprehensively negative.    Physical Exam:  Vital Signs Last 24 Hrs  T(C): 36.6 (14 May 2018 07:09), Max: 36.9 (13 May 2018 20:25)  T(F): 97.9 (14 May 2018 07:09), Max: 98.5 (13 May 2018 20:25)  HR: 60 (14 May 2018 07:09) (60 - 70)  BP: 155/70 (14 May 2018 07:09) (127/73 - 180/74)  BP(mean): --  RR: 14 (14 May 2018 07:09) (14 - 19)  SpO2: 99% (14 May 2018 07:09) (97% - 99%)  General: NAD  HEENT: MMM  Neck: No JVD, no carotid bruit  Lungs: CTAB  CV: RRR, nl S1/S2, no M/R/G  Abdomen: S/NT/ND, +BS  Extremities: No LE edema, no cyanosis  Neuro: AAOx3, non-focal  Skin: No rash    Labs:             05-13    140  |  101  |  37<H>  ----------------------------<  102<H>  4.2   |  29  |  4.30<H>    Ca    8.8      13 May 2018 07:28    TPro  7.1  /  Alb  2.7<L>  /  TBili  0.3  /  DBili  x   /  AST  47<H>  /  ALT  49  /  AlkPhos  252<H>  05-13                        8.2    11.5  )-----------( 247      ( 13 May 2018 07:28 )             26.8         Telemetry: Sinus rhythm

## 2018-05-14 NOTE — CONSULT NOTE ADULT - PROBLEM SELECTOR RECOMMENDATION 9
d/c lantus 8 units qhs  cont lantus 24 units qam  add humalog 4 units tid before meals  change mod dose humalog scale coverage  goal bg 100-180 in hosp setting  change mod dose sliding scale coverage  goal

## 2018-05-14 NOTE — CHART NOTE - NSCHARTNOTEFT_GEN_A_CORE
Assessment: patient seen on dialysis . per RN eating fairly well vegeterian choices preferred.     Factors impacting intake: [ x] none [ ] nausea  [ ] vomiting [ ] diarrhea [ ] constipation  [ ]chewing problems [ ] swallowing issues  [ ] other:     Diet Presciption: Diet, Renal Restrictions:   For patients receiving Renal Replacement - No Protein Restr, No Conc K, No Conc Phos, Low Sodium  Consistent Carbohydrate {Evening Snack} (05-11-18 @ 16:32)    Intake: fairly good Per RN    Current Weight: 5/14 wt 200.4#      Pertinent Medications: MEDICATIONS  (STANDING):  acetaminophen   Tablet. 650 milliGRAM(s) Oral every 6 hours  aspirin enteric coated 81 milliGRAM(s) Oral daily  atorvastatin 20 milliGRAM(s) Oral at bedtime  clopidogrel Tablet 75 milliGRAM(s) Oral daily  dextrose 5%. 1000 milliLiter(s) (50 mL/Hr) IV Continuous <Continuous>  dextrose 50% Injectable 12.5 Gram(s) IV Push once  dextrose 50% Injectable 25 Gram(s) IV Push once  dextrose 50% Injectable 25 Gram(s) IV Push once  epoetin analilia Injectable 12622 Unit(s) IV Push <User Schedule>  gabapentin 300 milliGRAM(s) Oral two times a day  heparin  Injectable 5000 Unit(s) SubCutaneous every 12 hours  insulin glargine Injectable (LANTUS) 26 Unit(s) SubCutaneous every morning  insulin lispro (HumaLOG) corrective regimen sliding scale   SubCutaneous three times a day before meals  insulin lispro Injectable (HumaLOG) 4 Unit(s) SubCutaneous three times a day before meals  isosorbide   mononitrate ER Tablet (IMDUR) 30 milliGRAM(s) Oral daily  metoprolol tartrate 50 milliGRAM(s) Oral two times a day  pantoprazole    Tablet 40 milliGRAM(s) Oral before breakfast    MEDICATIONS  (PRN):  dextrose 40% Gel 15 Gram(s) Oral once PRN Blood Glucose LESS THAN 70 milliGRAM(s)/deciliter  glucagon  Injectable 1 milliGRAM(s) IntraMuscular once PRN Glucose LESS THAN 70 milligrams/deciliter  simethicone 80 milliGRAM(s) Chew daily PRN Heartburn    Pertinent Labs: 05-14 Na139 mmol/L Glu 153 mg/dL<H> K+ 4.4 mmol/L Cr  4.90 mg/dL<H> BUN 54 mg/dL<H> 05-13 Alb 2.7 g/dL<L> 05-04 [id number] 7.9 %<H>     CAPILLARY BLOOD GLUCOSE      POCT Blood Glucose.: 118 mg/dL (14 May 2018 12:39)  POCT Blood Glucose.: 143 mg/dL (14 May 2018 08:06)  POCT Blood Glucose.: 306 mg/dL (13 May 2018 21:50)  POCT Blood Glucose.: 256 mg/dL (13 May 2018 16:37)    Skin: kayli 18 no pressure injury    Estimated Needs:   [x ] no change since previous assessment  [ ] recalculated:     Previous Nutrition Diagnosis:   [ ] Inadequate Energy Intake [ ]Inadequate Oral Intake [ ] Excessive Energy Intake   [ ] Underweight [ ] Increased Nutrient Needs [ ] Overweight/Obesity   [ ] Altered GI Function [ ] Unintended Weight Loss [ x] Food & Nutrition Related Knowledge Deficit [ ] Malnutrition     Nutrition Diagnosis is [x ] ongoing  [ ] resolved [ ] not applicable noted educated at this admit patient on HD now    New Nutrition Diagnosis: [x ] not applicable       Interventions:   Recommend  [ ] Change Diet To:  [ ] Nutrition Supplement  [ ] Nutrition Support  [x ] Other: continue diet as ordered.     Monitoring and Evaluation:   x[ ] PO intake [ x ] Tolerance to diet prescription [ x ] weights [ x ] labs[ x ] follow up per protocol  [ ] other:

## 2018-05-14 NOTE — PROGRESS NOTE ADULT - ASSESSMENT
·	ESRD on HD  ·	CHF  ·	Anemia  ·	Hyperkalemia  ·	Diabetes  ·	Hypertension    HD today. Monitor h/h trend. On Epogen. K levels better.  Monitor blood sugar levels. Insulin coverage as needed. Dietary restriction.   Monitor BP trend. Titrate BP meds as needed. Salt restriction.    for out pt dialysis arrangements. D/c planning.

## 2018-05-14 NOTE — PROGRESS NOTE ADULT - PROVIDER SPECIALTY LIST ADULT
Anesthesia
Cardiology
Hospitalist
Nephrology
Cardiology
Cardiology
Hospitalist
Hospitalist

## 2018-10-25 ENCOUNTER — EMERGENCY (EMERGENCY)
Facility: HOSPITAL | Age: 68
LOS: 1 days | Discharge: HOME | End: 2018-10-25
Attending: EMERGENCY MEDICINE
Payer: COMMERCIAL

## 2018-10-25 VITALS
SYSTOLIC BLOOD PRESSURE: 193 MMHG | DIASTOLIC BLOOD PRESSURE: 93 MMHG | HEIGHT: 65 IN | OXYGEN SATURATION: 99 % | TEMPERATURE: 99 F | RESPIRATION RATE: 16 BRPM | HEART RATE: 80 BPM | WEIGHT: 210.1 LBS

## 2018-10-25 VITALS
TEMPERATURE: 97 F | SYSTOLIC BLOOD PRESSURE: 211 MMHG | HEART RATE: 69 BPM | RESPIRATION RATE: 16 BRPM | OXYGEN SATURATION: 93 % | DIASTOLIC BLOOD PRESSURE: 95 MMHG

## 2018-10-25 DIAGNOSIS — Z90.710 ACQUIRED ABSENCE OF BOTH CERVIX AND UTERUS: Chronic | ICD-10-CM

## 2018-10-25 PROBLEM — I10 ESSENTIAL (PRIMARY) HYPERTENSION: Chronic | Status: ACTIVE | Noted: 2018-05-03

## 2018-10-25 LAB
BASOPHILS # BLD AUTO: 0.05 K/UL — SIGNIFICANT CHANGE UP (ref 0–0.2)
BASOPHILS NFR BLD AUTO: 0.5 % — SIGNIFICANT CHANGE UP (ref 0–2)
EOSINOPHIL # BLD AUTO: 0.22 K/UL — SIGNIFICANT CHANGE UP (ref 0–0.5)
EOSINOPHIL NFR BLD AUTO: 2.2 % — SIGNIFICANT CHANGE UP (ref 0–6)
HCT VFR BLD CALC: 39.3 % — SIGNIFICANT CHANGE UP (ref 34.5–45)
HGB BLD-MCNC: 11.9 G/DL — SIGNIFICANT CHANGE UP (ref 11.5–15.5)
IMM GRANULOCYTES NFR BLD AUTO: 0.9 % — SIGNIFICANT CHANGE UP (ref 0–1.5)
LYMPHOCYTES # BLD AUTO: 3.3 K/UL — SIGNIFICANT CHANGE UP (ref 1–3.3)
LYMPHOCYTES # BLD AUTO: 33.1 % — SIGNIFICANT CHANGE UP (ref 13–44)
MCHC RBC-ENTMCNC: 26.9 PG — LOW (ref 27–34)
MCHC RBC-ENTMCNC: 30.3 GM/DL — LOW (ref 32–36)
MCV RBC AUTO: 88.7 FL — SIGNIFICANT CHANGE UP (ref 80–100)
MONOCYTES # BLD AUTO: 0.6 K/UL — SIGNIFICANT CHANGE UP (ref 0–0.9)
MONOCYTES NFR BLD AUTO: 6 % — SIGNIFICANT CHANGE UP (ref 2–14)
NEUTROPHILS # BLD AUTO: 5.72 K/UL — SIGNIFICANT CHANGE UP (ref 1.8–7.4)
NEUTROPHILS NFR BLD AUTO: 57.3 % — SIGNIFICANT CHANGE UP (ref 43–77)
PLATELET # BLD AUTO: 269 K/UL — SIGNIFICANT CHANGE UP (ref 150–400)
RBC # BLD: 4.43 M/UL — SIGNIFICANT CHANGE UP (ref 3.8–5.2)
RBC # FLD: 15.6 % — HIGH (ref 10.3–14.5)
WBC # BLD: 9.98 K/UL — SIGNIFICANT CHANGE UP (ref 3.8–10.5)
WBC # FLD AUTO: 9.98 K/UL — SIGNIFICANT CHANGE UP (ref 3.8–10.5)

## 2018-10-25 PROCEDURE — 85027 COMPLETE CBC AUTOMATED: CPT

## 2018-10-25 PROCEDURE — 36415 COLL VENOUS BLD VENIPUNCTURE: CPT

## 2018-10-25 PROCEDURE — 99284 EMERGENCY DEPT VISIT MOD MDM: CPT

## 2018-10-25 NOTE — ED ADULT TRIAGE NOTE - CHIEF COMPLAINT QUOTE
Pt and family report her dialysis port located to the Right chest wall "Came out", she had dialysis yesterday

## 2018-10-25 NOTE — ED PROVIDER NOTE - OBJECTIVE STATEMENT
68 year old female with multiple medical problems came tot he ED after the dialysis catheter in her chest accidentally fell out. the daughter immediately covered the area with guaze and there was no bleeding. She has no complaints and had dialysis yesterday.

## 2018-10-25 NOTE — CONSULT NOTE ADULT - ASSESSMENT
·	ESRD on HD  ·	Dialysis catheter pulled out     Check stat h/h. If pt stable for discharge pt can have new dialysis catheter placed by [facility] tomorrow am.   D/w pt and her family. Pt will go for dialysis tomorrow after catheter placement. D/w Dr. Edmond.   Further recommendations pending clinical course. Thank you for the courtesy of this referral.

## 2018-10-25 NOTE — ED PROVIDER NOTE - CHIEF COMPLAINT
The patient is a 68y Female complaining of see chief complaint quote. The patient is a 68y Female complaining of dialysis catheter problem.

## 2018-10-25 NOTE — ED PROVIDER NOTE - MEDICAL DECISION MAKING DETAILS
Arrangements made to have catheter replaced outpatient, pt did not take her bp meds this am and will take them when she goes home

## 2018-10-25 NOTE — ED PROVIDER NOTE - PROGRESS NOTE DETAILS
Seen by Dr Palacios, appointment made to have catheter re-inserted tomorrow 11AM at [facility] [address]

## 2018-10-25 NOTE — ED ADULT NURSE NOTE - OBJECTIVE STATEMENT
Presents to ER for placement of new HD cath. Family at bedside states pt was in the bathroom and it came out.

## 2018-10-25 NOTE — ED ADULT NURSE NOTE - NSIMPLEMENTINTERV_GEN_ALL_ED
Implemented All Universal Safety Interventions:  [place] to call system. Call bell, personal items and telephone within reach. Instruct patient to call for assistance. Room bathroom lighting operational. Non-slip footwear when patient is off stretcher. Physically safe environment: no spills, clutter or unnecessary equipment. Stretcher in lowest position, wheels locked, appropriate side rails in place.

## 2018-10-25 NOTE — CONSULT NOTE ADULT - SUBJECTIVE AND OBJECTIVE BOX
Patient is a 68y old  Female who presents with a chief complaint of dialysis catheter came out.     HPI: Pt came to Er after having pulled out dialysis catheter accidently. No obvious bleeding.       PAST MEDICAL HISTORY:  CAD (coronary artery disease)  Diabetes  CRF (chronic renal failure)  Hypertension  Pneumonia  Myocardial infarction  Hypertension  Diabetes      PAST SURGICAL HISTORY:  H/O: hysterectomy      FAMILY HISTORY:  No pertinent family history in first degree relatives      SOCIAL HISTORY: No smoking or alcohol use     Allergies    No Known Allergies    Intolerances      Home Medications:  Aspirin Enteric Coated 81 mg oral delayed release tablet: 1 tab(s) orally once a day (25 Oct 2018 12:11)  calcium-vitamin D 500 mg-200 intl units oral tablet: 1 tab(s) orally once a day (25 Oct 2018 12:11)  clopidogrel 75 mg oral tablet: 1 tab(s) orally once a day (25 Oct 2018 12:11)  docusate: 100 milligram(s) orally once a day (25 Oct 2018 12:11)  epoetin analilia: 65613 unit(s) intravenously once a month (25 Oct 2018 12:11)  ferrous sulfate 325 mg (65 mg elemental iron) oral tablet: 1 tab(s) orally 3 times a day (25 Oct 2018 12:11)  gabapentin 300 mg oral capsule: 1 cap(s) orally 2 times a day (25 Oct 2018 12:11)  isosorbide mononitrate 30 mg oral tablet, extended release: 2 tab(s) orally once a day (in the morning) (25 Oct 2018 12:11)  isosorbide mononitrate 30 mg oral tablet, extended release: 1 tab(s) orally once a day (in the evening) (04 May 2018 12:47)  metoprolol tartrate 50 mg oral tablet: 1 tab(s) orally 2 times a day (04 May 2018 12:47)  NIFEdipine 60 mg oral tablet, extended release: 1 tab(s) orally once a day (04 May 2018 12:47)  rosuvastatin 20 mg oral tablet: 1 tab(s) orally once a day (04 May 2018 12:47)    MEDICATIONS  (STANDING):    MEDICATIONS  (PRN):      REVIEW OF SYSTEMS:  General: NAD  Respiratory: No cough, SOB  Cardiovascular: No CP or Palpitations	  Gastrointestinal: No nausea, Vomiting. No diarrhea  Genitourinary: No urinary complaints	  Musculoskeletal: No leg swelling, No new rash or lesions	  all other systems negative    T(F): 98.9 (10-25-18 @ 12:08), Max: 98.9 (10-25-18 @ 12:08)  HR: 80 (10-25-18 @ 12:08) (80 - 80)  BP: 193/93 (10-25-18 @ 12:08) (193/93 - 193/93)  RR: 16 (10-25-18 @ 12:08) (16 - 16)  SpO2: 99% (10-25-18 @ 12:08) (99% - 99%)  Wt(kg): --    PHYSICAL EXAM:  General: NAD, no hematoma  Respiratory: b/l air entry  Cardiovascular: S1 S2  Gastrointestinal: soft  Extremities: edema      Creatinine, Serum: 4.90 mg/dL (05.14.18 @ 07:23)

## 2018-10-29 ENCOUNTER — INPATIENT (INPATIENT)
Facility: HOSPITAL | Age: 68
LOS: 17 days | Discharge: ANOTHER HOSPITAL | DRG: 871 | End: 2018-11-16
Attending: FAMILY MEDICINE | Admitting: HOSPITALIST
Payer: COMMERCIAL

## 2018-10-29 VITALS
DIASTOLIC BLOOD PRESSURE: 77 MMHG | HEART RATE: 104 BPM | RESPIRATION RATE: 22 BRPM | TEMPERATURE: 103 F | SYSTOLIC BLOOD PRESSURE: 174 MMHG | OXYGEN SATURATION: 91 % | WEIGHT: 210.1 LBS

## 2018-10-29 DIAGNOSIS — Z90.710 ACQUIRED ABSENCE OF BOTH CERVIX AND UTERUS: Chronic | ICD-10-CM

## 2018-10-29 DIAGNOSIS — A41.9 SEPSIS, UNSPECIFIED ORGANISM: ICD-10-CM

## 2018-10-29 DIAGNOSIS — N18.6 END STAGE RENAL DISEASE: ICD-10-CM

## 2018-10-29 DIAGNOSIS — I10 ESSENTIAL (PRIMARY) HYPERTENSION: ICD-10-CM

## 2018-10-29 DIAGNOSIS — E11.9 TYPE 2 DIABETES MELLITUS WITHOUT COMPLICATIONS: ICD-10-CM

## 2018-10-29 DIAGNOSIS — Z29.9 ENCOUNTER FOR PROPHYLACTIC MEASURES, UNSPECIFIED: ICD-10-CM

## 2018-10-29 DIAGNOSIS — K81.0 ACUTE CHOLECYSTITIS: ICD-10-CM

## 2018-10-29 DIAGNOSIS — I25.10 ATHEROSCLEROTIC HEART DISEASE OF NATIVE CORONARY ARTERY WITHOUT ANGINA PECTORIS: ICD-10-CM

## 2018-10-29 LAB
ALBUMIN SERPL ELPH-MCNC: 2.6 G/DL — LOW (ref 3.3–5)
ALP SERPL-CCNC: 300 U/L — HIGH (ref 40–120)
ALT FLD-CCNC: 136 U/L — HIGH (ref 12–78)
ANION GAP SERPL CALC-SCNC: 12 MMOL/L — SIGNIFICANT CHANGE UP (ref 5–17)
APPEARANCE UR: CLEAR — SIGNIFICANT CHANGE UP
APTT BLD: 31.3 SEC — SIGNIFICANT CHANGE UP (ref 27.5–37.4)
AST SERPL-CCNC: 246 U/L — HIGH (ref 15–37)
BACTERIA # UR AUTO: ABNORMAL
BASOPHILS # BLD AUTO: 0 K/UL — SIGNIFICANT CHANGE UP (ref 0–0.2)
BASOPHILS NFR BLD AUTO: 0 % — SIGNIFICANT CHANGE UP (ref 0–2)
BILIRUB SERPL-MCNC: 4.2 MG/DL — HIGH (ref 0.2–1.2)
BILIRUB UR-MCNC: ABNORMAL
BUN SERPL-MCNC: 49 MG/DL — HIGH (ref 7–23)
CALCIUM SERPL-MCNC: 8.7 MG/DL — SIGNIFICANT CHANGE UP (ref 8.5–10.1)
CHLORIDE SERPL-SCNC: 100 MMOL/L — SIGNIFICANT CHANGE UP (ref 96–108)
CO2 SERPL-SCNC: 24 MMOL/L — SIGNIFICANT CHANGE UP (ref 22–31)
COLOR SPEC: YELLOW — SIGNIFICANT CHANGE UP
CREAT SERPL-MCNC: 5.4 MG/DL — HIGH (ref 0.5–1.3)
DIFF PNL FLD: ABNORMAL
EOSINOPHIL # BLD AUTO: 0 K/UL — SIGNIFICANT CHANGE UP (ref 0–0.5)
EOSINOPHIL NFR BLD AUTO: 0 % — SIGNIFICANT CHANGE UP (ref 0–6)
EPI CELLS # UR: SIGNIFICANT CHANGE UP
GLUCOSE SERPL-MCNC: 359 MG/DL — HIGH (ref 70–99)
GLUCOSE UR QL: 1000 MG/DL
HCT VFR BLD CALC: 37.9 % — SIGNIFICANT CHANGE UP (ref 34.5–45)
HGB BLD-MCNC: 11.7 G/DL — SIGNIFICANT CHANGE UP (ref 11.5–15.5)
INR BLD: 1.69 RATIO — HIGH (ref 0.88–1.16)
KETONES UR-MCNC: NEGATIVE — SIGNIFICANT CHANGE UP
LACTATE SERPL-SCNC: 2.2 MMOL/L — HIGH (ref 0.7–2)
LACTATE SERPL-SCNC: 2.3 MMOL/L — HIGH (ref 0.7–2)
LEUKOCYTE ESTERASE UR-ACNC: NEGATIVE — SIGNIFICANT CHANGE UP
LYMPHOCYTES # BLD AUTO: 1.68 K/UL — SIGNIFICANT CHANGE UP (ref 1–3.3)
LYMPHOCYTES # BLD AUTO: 7 % — LOW (ref 13–44)
MANUAL SMEAR VERIFICATION: SIGNIFICANT CHANGE UP
MCHC RBC-ENTMCNC: 27.3 PG — SIGNIFICANT CHANGE UP (ref 27–34)
MCHC RBC-ENTMCNC: 30.9 GM/DL — LOW (ref 32–36)
MCV RBC AUTO: 88.6 FL — SIGNIFICANT CHANGE UP (ref 80–100)
MONOCYTES # BLD AUTO: 1.44 K/UL — HIGH (ref 0–0.9)
MONOCYTES NFR BLD AUTO: 6 % — SIGNIFICANT CHANGE UP (ref 2–14)
MYELOCYTES NFR BLD: 1 % — HIGH (ref 0–0)
NEUTROPHILS # BLD AUTO: 20.61 K/UL — HIGH (ref 1.8–7.4)
NEUTROPHILS NFR BLD AUTO: 56 % — SIGNIFICANT CHANGE UP (ref 43–77)
NEUTS BAND # BLD: 30 % — HIGH (ref 0–8)
NITRITE UR-MCNC: NEGATIVE — SIGNIFICANT CHANGE UP
NRBC # BLD: 0 — SIGNIFICANT CHANGE UP
NRBC # BLD: SIGNIFICANT CHANGE UP /100 WBCS (ref 0–0)
PH UR: 7 — SIGNIFICANT CHANGE UP (ref 5–8)
PLAT MORPH BLD: NORMAL — SIGNIFICANT CHANGE UP
PLATELET # BLD AUTO: 241 K/UL — SIGNIFICANT CHANGE UP (ref 150–400)
POTASSIUM SERPL-MCNC: 5.1 MMOL/L — SIGNIFICANT CHANGE UP (ref 3.5–5.3)
POTASSIUM SERPL-SCNC: 5.1 MMOL/L — SIGNIFICANT CHANGE UP (ref 3.5–5.3)
PROCALCITONIN SERPL-MCNC: 132.3 NG/ML — SIGNIFICANT CHANGE UP (ref 0–0.04)
PROT SERPL-MCNC: 7.7 G/DL — SIGNIFICANT CHANGE UP (ref 6–8.3)
PROT UR-MCNC: 500 MG/DL
PROTHROM AB SERPL-ACNC: 18.6 SEC — HIGH (ref 9.8–12.7)
RAPID RVP RESULT: SIGNIFICANT CHANGE UP
RBC # BLD: 4.28 M/UL — SIGNIFICANT CHANGE UP (ref 3.8–5.2)
RBC # FLD: 16.5 % — HIGH (ref 10.3–14.5)
RBC BLD AUTO: SIGNIFICANT CHANGE UP
RBC CASTS # UR COMP ASSIST: ABNORMAL /HPF (ref 0–4)
SODIUM SERPL-SCNC: 136 MMOL/L — SIGNIFICANT CHANGE UP (ref 135–145)
SP GR SPEC: 1 — LOW (ref 1.01–1.02)
UROBILINOGEN FLD QL: NEGATIVE — SIGNIFICANT CHANGE UP
WBC # BLD: 23.96 K/UL — HIGH (ref 3.8–10.5)
WBC # FLD AUTO: 23.96 K/UL — HIGH (ref 3.8–10.5)
WBC UR QL: SIGNIFICANT CHANGE UP

## 2018-10-29 PROCEDURE — 74181 MRI ABDOMEN W/O CONTRAST: CPT | Mod: 26

## 2018-10-29 PROCEDURE — 93010 ELECTROCARDIOGRAM REPORT: CPT

## 2018-10-29 PROCEDURE — 74176 CT ABD & PELVIS W/O CONTRAST: CPT | Mod: 26

## 2018-10-29 PROCEDURE — 99223 1ST HOSP IP/OBS HIGH 75: CPT | Mod: GC,AI

## 2018-10-29 PROCEDURE — 99291 CRITICAL CARE FIRST HOUR: CPT

## 2018-10-29 PROCEDURE — 71045 X-RAY EXAM CHEST 1 VIEW: CPT | Mod: 26

## 2018-10-29 PROCEDURE — 99285 EMERGENCY DEPT VISIT HI MDM: CPT

## 2018-10-29 PROCEDURE — 71250 CT THORAX DX C-: CPT | Mod: 26

## 2018-10-29 RX ORDER — METOPROLOL TARTRATE 50 MG
50 TABLET ORAL
Qty: 0 | Refills: 0 | Status: DISCONTINUED | OUTPATIENT
Start: 2018-10-29 | End: 2018-10-30

## 2018-10-29 RX ORDER — GLUCAGON INJECTION, SOLUTION 0.5 MG/.1ML
1 INJECTION, SOLUTION SUBCUTANEOUS ONCE
Qty: 0 | Refills: 0 | Status: DISCONTINUED | OUTPATIENT
Start: 2018-10-29 | End: 2018-11-16

## 2018-10-29 RX ORDER — INSULIN LISPRO 100/ML
VIAL (ML) SUBCUTANEOUS EVERY 6 HOURS
Qty: 0 | Refills: 0 | Status: DISCONTINUED | OUTPATIENT
Start: 2018-10-29 | End: 2018-11-02

## 2018-10-29 RX ORDER — PANTOPRAZOLE SODIUM 20 MG/1
40 TABLET, DELAYED RELEASE ORAL DAILY
Qty: 0 | Refills: 0 | Status: DISCONTINUED | OUTPATIENT
Start: 2018-10-29 | End: 2018-11-01

## 2018-10-29 RX ORDER — PIPERACILLIN AND TAZOBACTAM 4; .5 G/20ML; G/20ML
3.38 INJECTION, POWDER, LYOPHILIZED, FOR SOLUTION INTRAVENOUS EVERY 12 HOURS
Qty: 0 | Refills: 0 | Status: DISCONTINUED | OUTPATIENT
Start: 2018-10-30 | End: 2018-11-01

## 2018-10-29 RX ORDER — DEXTROSE 50 % IN WATER 50 %
25 SYRINGE (ML) INTRAVENOUS ONCE
Qty: 0 | Refills: 0 | Status: DISCONTINUED | OUTPATIENT
Start: 2018-10-29 | End: 2018-11-16

## 2018-10-29 RX ORDER — FERROUS SULFATE 325(65) MG
325 TABLET ORAL THREE TIMES A DAY
Qty: 0 | Refills: 0 | Status: DISCONTINUED | OUTPATIENT
Start: 2018-10-29 | End: 2018-11-16

## 2018-10-29 RX ORDER — ISOSORBIDE MONONITRATE 60 MG/1
30 TABLET, EXTENDED RELEASE ORAL AT BEDTIME
Qty: 0 | Refills: 0 | Status: DISCONTINUED | OUTPATIENT
Start: 2018-10-29 | End: 2018-10-29

## 2018-10-29 RX ORDER — ACETAMINOPHEN 500 MG
650 TABLET ORAL ONCE
Qty: 0 | Refills: 0 | Status: COMPLETED | OUTPATIENT
Start: 2018-10-29 | End: 2018-10-29

## 2018-10-29 RX ORDER — SODIUM CHLORIDE 9 MG/ML
1000 INJECTION INTRAMUSCULAR; INTRAVENOUS; SUBCUTANEOUS ONCE
Qty: 0 | Refills: 0 | Status: COMPLETED | OUTPATIENT
Start: 2018-10-29 | End: 2018-10-29

## 2018-10-29 RX ORDER — SODIUM CHLORIDE 9 MG/ML
1000 INJECTION, SOLUTION INTRAVENOUS
Qty: 0 | Refills: 0 | Status: DISCONTINUED | OUTPATIENT
Start: 2018-10-29 | End: 2018-11-16

## 2018-10-29 RX ORDER — VANCOMYCIN HCL 1 G
1000 VIAL (EA) INTRAVENOUS ONCE
Qty: 0 | Refills: 0 | Status: COMPLETED | OUTPATIENT
Start: 2018-10-29 | End: 2018-10-29

## 2018-10-29 RX ORDER — HEPARIN SODIUM 5000 [USP'U]/ML
5000 INJECTION INTRAVENOUS; SUBCUTANEOUS EVERY 8 HOURS
Qty: 0 | Refills: 0 | Status: DISCONTINUED | OUTPATIENT
Start: 2018-10-29 | End: 2018-11-11

## 2018-10-29 RX ORDER — ISOSORBIDE MONONITRATE 60 MG/1
60 TABLET, EXTENDED RELEASE ORAL DAILY
Qty: 0 | Refills: 0 | Status: DISCONTINUED | OUTPATIENT
Start: 2018-10-29 | End: 2018-10-29

## 2018-10-29 RX ORDER — NIFEDIPINE 30 MG
60 TABLET, EXTENDED RELEASE 24 HR ORAL DAILY
Qty: 0 | Refills: 0 | Status: DISCONTINUED | OUTPATIENT
Start: 2018-10-29 | End: 2018-10-29

## 2018-10-29 RX ORDER — INSULIN GLARGINE 100 [IU]/ML
12 INJECTION, SOLUTION SUBCUTANEOUS AT BEDTIME
Qty: 0 | Refills: 0 | Status: DISCONTINUED | OUTPATIENT
Start: 2018-10-29 | End: 2018-10-31

## 2018-10-29 RX ORDER — DEXTROSE 50 % IN WATER 50 %
12.5 SYRINGE (ML) INTRAVENOUS ONCE
Qty: 0 | Refills: 0 | Status: DISCONTINUED | OUTPATIENT
Start: 2018-10-29 | End: 2018-11-16

## 2018-10-29 RX ORDER — ASPIRIN/CALCIUM CARB/MAGNESIUM 324 MG
81 TABLET ORAL DAILY
Qty: 0 | Refills: 0 | Status: DISCONTINUED | OUTPATIENT
Start: 2018-10-29 | End: 2018-10-29

## 2018-10-29 RX ORDER — PIPERACILLIN AND TAZOBACTAM 4; .5 G/20ML; G/20ML
3.38 INJECTION, POWDER, LYOPHILIZED, FOR SOLUTION INTRAVENOUS ONCE
Qty: 0 | Refills: 0 | Status: COMPLETED | OUTPATIENT
Start: 2018-10-29 | End: 2018-10-29

## 2018-10-29 RX ORDER — ATORVASTATIN CALCIUM 80 MG/1
80 TABLET, FILM COATED ORAL AT BEDTIME
Qty: 0 | Refills: 0 | Status: DISCONTINUED | OUTPATIENT
Start: 2018-10-29 | End: 2018-10-29

## 2018-10-29 RX ORDER — DEXTROSE 50 % IN WATER 50 %
15 SYRINGE (ML) INTRAVENOUS ONCE
Qty: 0 | Refills: 0 | Status: DISCONTINUED | OUTPATIENT
Start: 2018-10-29 | End: 2018-11-16

## 2018-10-29 RX ORDER — SODIUM CHLORIDE 9 MG/ML
1000 INJECTION, SOLUTION INTRAVENOUS ONCE
Qty: 0 | Refills: 0 | Status: COMPLETED | OUTPATIENT
Start: 2018-10-29 | End: 2018-10-29

## 2018-10-29 RX ADMIN — Medication 650 MILLIGRAM(S): at 15:55

## 2018-10-29 RX ADMIN — Medication 250 MILLIGRAM(S): at 15:35

## 2018-10-29 RX ADMIN — SODIUM CHLORIDE 2000 MILLILITER(S): 9 INJECTION, SOLUTION INTRAVENOUS at 22:42

## 2018-10-29 RX ADMIN — Medication 325 MILLIGRAM(S): at 22:42

## 2018-10-29 RX ADMIN — PIPERACILLIN AND TAZOBACTAM 3.38 GRAM(S): 4; .5 INJECTION, POWDER, LYOPHILIZED, FOR SOLUTION INTRAVENOUS at 15:30

## 2018-10-29 RX ADMIN — SODIUM CHLORIDE 2000 MILLILITER(S): 9 INJECTION INTRAMUSCULAR; INTRAVENOUS; SUBCUTANEOUS at 20:22

## 2018-10-29 RX ADMIN — SODIUM CHLORIDE 1000 MILLILITER(S): 9 INJECTION INTRAMUSCULAR; INTRAVENOUS; SUBCUTANEOUS at 16:10

## 2018-10-29 RX ADMIN — Medication 1000 MILLIGRAM(S): at 16:30

## 2018-10-29 RX ADMIN — SODIUM CHLORIDE 1000 MILLILITER(S): 9 INJECTION INTRAMUSCULAR; INTRAVENOUS; SUBCUTANEOUS at 16:05

## 2018-10-29 RX ADMIN — SODIUM CHLORIDE 1000 MILLILITER(S): 9 INJECTION INTRAMUSCULAR; INTRAVENOUS; SUBCUTANEOUS at 14:59

## 2018-10-29 RX ADMIN — PIPERACILLIN AND TAZOBACTAM 200 GRAM(S): 4; .5 INJECTION, POWDER, LYOPHILIZED, FOR SOLUTION INTRAVENOUS at 14:59

## 2018-10-29 RX ADMIN — Medication 8: at 22:41

## 2018-10-29 RX ADMIN — Medication 650 MILLIGRAM(S): at 14:59

## 2018-10-29 RX ADMIN — HEPARIN SODIUM 5000 UNIT(S): 5000 INJECTION INTRAVENOUS; SUBCUTANEOUS at 22:40

## 2018-10-29 RX ADMIN — INSULIN GLARGINE 12 UNIT(S): 100 INJECTION, SOLUTION SUBCUTANEOUS at 22:41

## 2018-10-29 RX ADMIN — SODIUM CHLORIDE 1000 MILLILITER(S): 9 INJECTION INTRAMUSCULAR; INTRAVENOUS; SUBCUTANEOUS at 17:10

## 2018-10-29 NOTE — CONSULT NOTE ADULT - SUBJECTIVE AND OBJECTIVE BOX
Patient is a 68y old  Female who presents with a chief complaint of wekaness, Fever.   HPI: Pt brought to ER with weakness and fever. Pt has dialysis catheter changed few days back after it was accidently pulled out.   Pt's family at bedside.       PAST MEDICAL HISTORY:  CAD (coronary artery disease)  Diabetes  CRF (chronic renal failure)  Hypertension  Pneumonia  Myocardial infarction  Hypertension  Diabetes      PAST SURGICAL HISTORY:  H/O: hysterectomy      FAMILY HISTORY:  No pertinent family history in first degree relatives      SOCIAL HISTORY: No smoking or alcohol use     Allergies    No Known Allergies    Intolerances      Home Medications:  Aspirin Enteric Coated 81 mg oral delayed release tablet: 1 tab(s) orally once a day (25 Oct 2018 12:11)  calcium-vitamin D 500 mg-200 intl units oral tablet: 1 tab(s) orally once a day (25 Oct 2018 12:11)  clopidogrel 75 mg oral tablet: 1 tab(s) orally once a day (25 Oct 2018 12:11)  docusate: 100 milligram(s) orally once a day (25 Oct 2018 12:11)  epoetin analilia: 33320 unit(s) intravenously once a month (25 Oct 2018 12:11)  ferrous sulfate 325 mg (65 mg elemental iron) oral tablet: 1 tab(s) orally 3 times a day (25 Oct 2018 12:11)  gabapentin 300 mg oral capsule: 1 cap(s) orally 2 times a day (25 Oct 2018 12:11)  isosorbide mononitrate 30 mg oral tablet, extended release: 2 tab(s) orally once a day (in the morning) (25 Oct 2018 12:11)  isosorbide mononitrate 30 mg oral tablet, extended release: 1 tab(s) orally once a day (in the evening) (04 May 2018 12:47)  metoprolol tartrate 50 mg oral tablet: 1 tab(s) orally 2 times a day (04 May 2018 12:47)  NIFEdipine 60 mg oral tablet, extended release: 1 tab(s) orally once a day (04 May 2018 12:47)  rosuvastatin 20 mg oral tablet: 1 tab(s) orally once a day (04 May 2018 12:47)    MEDICATIONS  (STANDING):  vancomycin  IVPB 1000 milliGRAM(s) IV Intermittent once    MEDICATIONS  (PRN):      REVIEW OF SYSTEMS:  General: NAD  Respiratory: + cough, SOB  Cardiovascular: No CP or Palpitations	  Gastrointestinal: No nausea, Vomiting. No diarrhea  Genitourinary: No urinary complaints	  Musculoskeletal: No leg swelling, No new rash or lesions	  all other systems negative    T(F): 103.2 (10-29-18 @ 14:40), Max: 103.2 (10-29-18 @ 14:40)  HR: 104 (10-29-18 @ 14:09) (104 - 104)  BP: 174/77 (10-29-18 @ 14:09) (174/77 - 174/77)  RR: 22 (10-29-18 @ 14:09) (22 - 22)  SpO2: 91% (10-29-18 @ 14:09) (91% - 91%)  Wt(kg): --    PHYSICAL EXAM:  General: NAD, Dialysis catheter  Respiratory: b/l air entry  Cardiovascular: S1 S2  Gastrointestinal: soft  Extremities: no edema        LABORATORY:      Labs Pending

## 2018-10-29 NOTE — H&P ADULT - PMH
CAD (coronary artery disease)    CRF (chronic renal failure)    Diabetes    Diabetes    Hypertension    Hypertension    Myocardial infarction    Pneumonia CAD (coronary artery disease)  s/p stent in 2007  Diabetes    ESRD (end stage renal disease) on dialysis  MWF  Hypertension    Myocardial infarction

## 2018-10-29 NOTE — CONSULT NOTE ADULT - SUBJECTIVE AND OBJECTIVE BOX
Chief Complaint:  Patient is a 68y old  Female who presents with a chief complaint of sepsis   · Chief Complaint: The patient is a 68y Female complaining of fever.	  · HPI Objective Statement: HO from daughter.  fever and weakness today.  Watery diarrhea x 7 yesterday and 1 episode this am. vomiting x 7-8 yesterday and once today.  no ho recent ab usage.  pmd- [place].	  · Presenting Symptoms: FEVER	  · Negative Findings: no hematuria	  · Highest Temperature: subjective	  · Duration: yesterday	  · Severity: PAIN SCALE 0 OF 10.	  · Aggravated Factors: none	  · Relieving Factors: none	  no sob or chest pain  no jaundice no icterus    Allergies:  No Known Allergies      Medications:      PMHX/PSHX:  CAD (coronary artery disease)  Diabetes  CRF (chronic renal failure)  Hypertension  Pneumonia  Myocardial infarction  Hypertension  Diabetes  H/O: hysterectomy      Family history:  No pertinent family history in first degree relatives      Social History: no eoth no cigs no ivda     ROS:     General:  No wt loss, fevers, chills, night sweats, fatigue,   Eyes:  Good vision, no reported pain  ENT:  No sore throat, pain, runny nose, dysphagia  CV:  No pain, palpitations, hypo/hypertension  Resp:  No dyspnea, cough, tachypnea, wheezing  GI:  No pain, No nausea, No vomiting, No diarrhea, No constipation, No weight loss, No fever, No pruritis, No rectal bleeding, No tarry stools, No dysphagia,  :  No pain, bleeding, incontinence, nocturia  Muscle:  No pain, weakness  Neuro:  No weakness, tingling, memory problems  Psych:  No fatigue, insomnia, mood problems, depression  Endocrine:  No polyuria, polydipsia, cold/heat intolerance  Heme:  No petechiae, ecchymosis, easy bruisability  Skin:  No rash, tattoos, scars, edema      PHYSICAL EXAM:   Vital Signs:  Vital Signs Last 24 Hrs  T(C): 37.7 (29 Oct 2018 15:50), Max: 39.6 (29 Oct 2018 14:40)  T(F): 99.8 (29 Oct 2018 15:50), Max: 103.2 (29 Oct 2018 14:40)  HR: 95 (29 Oct 2018 15:50) (95 - 104)  BP: 104/61 (29 Oct 2018 15:50) (104/61 - 174/77)  BP(mean): --  RR: 20 (29 Oct 2018 15:53) (20 - 22)  SpO2: 96% (29 Oct 2018 15:53) (90% - 96%)  Daily     Daily     GENERAL:  Appears stated age, well-groomed, well-nourished, no distress  HEENT:  NC/AT,  conjunctivae clear and pink, no thyromegaly, nodules, adenopathy, no JVD, sclera -anicteric  CHEST:  Full & symmetric excursion, no increased effort, breath sounds clear  HEART:  Regular rhythm, S1, S2, no murmur/rub/S3/S4, no abdominal bruit, no edema  ABDOMEN:  Soft, non-tender, non-distended, normoactive bowel sounds,  no masses ,no hepato-splenomegaly, no signs of chronic liver disease  EXTEREMITIES:  no cyanosis,clubbing or edema  SKIN:  No rash/erythema/ecchymoses/petechiae/wounds/abscess/warm/dry  NEURO:  Alert, oriented, no asterixis, no tremor, no encephalopathy    LABS:                        11.7   23.96 )-----------( 241      ( 29 Oct 2018 15:30 )             37.9     10-29    136  |  100  |  49<H>  ----------------------------<  359<H>  5.1   |  24  |  5.40<H>    Ca    8.7      29 Oct 2018 15:30    TPro  7.7  /  Alb  2.6<L>  /  TBili  4.2<H>  /  DBili  x   /  AST  246<H>  /  ALT  136<H>  /  AlkPhos  300<H>  10-29    LIVER FUNCTIONS - ( 29 Oct 2018 15:30 )  Alb: 2.6 g/dL / Pro: 7.7 g/dL / ALK PHOS: 300 U/L / ALT: 136 U/L / AST: 246 U/L / GGT: x           PT/INR - ( 29 Oct 2018 15:30 )   PT: 18.6 sec;   INR: 1.69 ratio         PTT - ( 29 Oct 2018 15:30 )  PTT:31.3 sec  Urinalysis Basic - ( 29 Oct 2018 15:30 )    Color: Yellow / Appearance: Clear / S.005 / pH: x  Gluc: x / Ketone: Negative  / Bili: Small / Urobili: Negative   Blood: x / Protein: 500 mg/dL / Nitrite: Negative   Leuk Esterase: Negative / RBC: 3-5 /HPF / WBC 0-2   Sq Epi: x / Non Sq Epi: Few / Bacteria: Few      Amylase Serum--      Lipase serum84       Ammonia--      Imaging:

## 2018-10-29 NOTE — ED ADULT NURSE NOTE - NSIMPLEMENTINTERV_GEN_ALL_ED
Implemented All Fall Risk Interventions:  [place] to call system. Call bell, personal items and telephone within reach. Instruct patient to call for assistance. Room bathroom lighting operational. Non-slip footwear when patient is off stretcher. Physically safe environment: no spills, clutter or unnecessary equipment. Stretcher in lowest position, wheels locked, appropriate side rails in place. Provide visual cue, wrist band, yellow gown, etc. Monitor gait and stability. Monitor for mental status changes and reorient to person, place, and time. Review medications for side effects contributing to fall risk. Reinforce activity limits and safety measures with patient and family.

## 2018-10-29 NOTE — H&P ADULT - PROBLEM SELECTOR PLAN 6
Hold oral meds   - start on lantus and low dose insulin sliding scale   - accuchecks  - f/u HgA1c Hold oral meds   - start on lantus and low dose insulin sliding scale per ICU recs  - accuchecks  - f/u HgA1c

## 2018-10-29 NOTE — ED ADULT NURSE REASSESSMENT NOTE - NS ED NURSE REASSESS COMMENT FT1
dr reyes in to see pt aware of blood sugar of 313 stated to wait to give coverage until he speaks w/ ICU

## 2018-10-29 NOTE — CONSULT NOTE ADULT - ASSESSMENT
67 yo with sepsis, likely from cholangitis/cholecystitis    ICU admit for resuscitation    AM eval for poss percutaneous cholecystostomy vs. surgery, PT very high surgical risk

## 2018-10-29 NOTE — ED PROVIDER NOTE - PROGRESS NOTE DETAILS
Will repeat Lactate p 2 LNS, pt is ESRD and will not give 30cc/kg for now. case melinda Lambert case dw ROBI Campos and JERRY Benavides case dw Sideridis and ICU ( Dr. Saenz)

## 2018-10-29 NOTE — ED PROVIDER NOTE - CARE PLAN
Principal Discharge DX:	Sepsis Principal Discharge DX:	Sepsis  Secondary Diagnosis:	Cholecystitis, acute

## 2018-10-29 NOTE — H&P ADULT - PROBLEM SELECTOR PLAN 7
- DVT ppx with lovenox hold DVT ppx for ercp tomorrow DVT proph at discretion of ICU: potential perioperative pt

## 2018-10-29 NOTE — H&P ADULT - ATTENDING COMMENTS
Seen and examined by attending MD, agree with above and edited to reflect my findings. Case discussed with patient with bradly at bedside, both voiced understanding and agreement to aforementioned plan.

## 2018-10-29 NOTE — H&P ADULT - NSHPPHYSICALEXAM_GEN_ALL_CORE
Vital Signs Last 24 Hrs  T(C): 37.7 (29 Oct 2018 15:50), Max: 39.6 (29 Oct 2018 14:40)  T(F): 99.8 (29 Oct 2018 15:50), Max: 103.2 (29 Oct 2018 14:40)  HR: 86 (29 Oct 2018 17:45) (86 - 104)  BP: 105/47 (29 Oct 2018 17:45) (104/61 - 174/77)  BP(mean): --  RR: 20 (29 Oct 2018 17:45) (20 - 22)  SpO2: 99% (29 Oct 2018 17:45) (90% - 99%) Vital Signs Last 24 Hrs  T(C): 37.7 (29 Oct 2018 15:50), Max: 39.6 (29 Oct 2018 14:40)  T(F): 99.8 (29 Oct 2018 15:50), Max: 103.2 (29 Oct 2018 14:40)  HR: 86 (29 Oct 2018 17:45) (86 - 104)  BP: 105/47 (29 Oct 2018 17:45) (104/61 - 174/77)  RR: 20 (29 Oct 2018 17:45) (20 - 22)  SpO2: 99% (29 Oct 2018 17:45) (90% - 99%) Vital Signs Last 24 Hrs  T(C): 37.7 (29 Oct 2018 15:50), Max: 39.6 (29 Oct 2018 14:40)  T(F): 99.8 (29 Oct 2018 15:50), Max: 103.2 (29 Oct 2018 14:40)  HR: 86 (29 Oct 2018 17:45) (86 - 104)  BP: 105/47 (29 Oct 2018 17:45) (104/61 - 174/77)  RR: 20 (29 Oct 2018 17:45) (20 - 22)  SpO2: 99% (29 Oct 2018 17:45) (90% - 99%)    Physical Exam:  General: Well developed, well nourished, No Acute Distress, overweight, on 3L NC  HEENT: Normocephalic Atraumatic, PERRLA, dry mucous membranes, sclera icteric  Neck: Supple, nontender, no mass  Neurology: AA&Ox3, CN II-XII grossly intact, sensation grossly intact  Respiratory: Clear To Auscultation B/L, No Wheezes, rhonchi or rales  CV: Regular Rate and Rhythm, +S1/S2, no murmurs, rubs or gallops  Abdominal: Soft, Non-Tender, Non-Distended, +Bowel Sounds  Extremities: No Clubbing, cyanosis, 1+ pitting edema, + peripheral pulses  Musculoskeletal: Normal Range of motion, no joint erythema or warmth, no joint swelling   Skin: warm, dry, normal color

## 2018-10-29 NOTE — CONSULT NOTE ADULT - ASSESSMENT
68y old female pmhx CAD s/p stent (2007), ESRD on HD (MWF; missed today's session), DM, HTN, HLD admitted with severe sepsis secondary to acute cholecystitis.      NEURO: No active issues.  Pain control as needed.    CV: Sepsis, unable to fulfill 30cc/kg volume resuscitation secondary to ESRD on HD and missed HD today.  See ID plan. Lactic acidosis, trending down.    RESP: Tachypneic on NC, titrate FiO2 for sPO2 >92%.  Monitor respiratory status.    RENAL: ESRD on HD.  Missed Dialysis session today.  Dr. Albarran following, plan for HD tomorrow.    GI: NPO.  Pantoprazole.   ENDO: DM on ISS and Lantus.  Home dose Lantus 24 U, will cut to 12 in setting of poor PO intake and NPO for foreseeable future.   ID: Acute cholecystitis.  Blood cultures pending.  Broad spectrum abx.  Will wean as tolerated.    HEME: No active issues.    DISPO: Full code. 68y old female pmhx CAD s/p stent (2007), ESRD on HD (MWF; missed today's session), DM, HTN, HLD admitted with severe sepsis secondary to acute cholecystitis.      NEURO: No active issues.  Pain control as needed.    CV: Sepsis, unable to fulfill 30cc/kg volume resuscitation secondary to ESRD on HD and missed HD today.  See ID plan. Lactic acidosis, trending down.    RESP: Tachypneic on NC, titrate FiO2 for sPO2 >92%.  Monitor respiratory status.    RENAL: ESRD on HD.  Missed Dialysis session today.  Dr. Albarran following, plan for HD tomorrow.    GI: NPO.  Transaminitis, trend.  Avoid hepatotoxic medications.  Pantoprazole.   ENDO: DM on ISS and Lantus.  Home dose Lantus 24 U, will cut to 12 in setting of poor PO intake and NPO for foreseeable future.   ID: Acute cholecystitis.  Blood cultures pending.  Broad spectrum abx.  Will wean as tolerated.    HEME: No active issues.    DISPO: Full code.

## 2018-10-29 NOTE — H&P ADULT - HISTORY OF PRESENT ILLNESS
Patient is a 68y old female pmhx CAD s/p stent (2007), ESRD on HD (MWF; missed today's session), DM, HTN, HLD brought in with complaints of abdominal pain, nausea, vomiting, fever x2 days. Watery diarrhea x 7 yesterday and 1 episode this am. vomiting x 7-8 yesterday and once today.  no ho recent ab usage.P t has dialysis catheter changed few days back after it was accidently pulled out.      Upon arrival to ED patient febrile at 102.9, tachycardiac at 104 and normotensive and labs significant for WBC 23.96 30% bands elevated transaminitis, elevated tbili and elevated lactate x2, Cr 5.4.  procal elevated. UA neg Since presentation BP steadily dropping. BP dropping originally  now with SBP 98.  CT chest abd pelvis: acute cholecystitis  RVP:neg  CXR: increased congestions  Patient given vancomycin and zosyn, tylenol and 2L IVF. Hx obtained from daughter  Patient is a 68y old female pmhx CAD s/p stent (2007), ESRD on HD (MWF; missed today's session), DM, HTN, HLD brought in with complaints of constant "hard pressure like" abdominal pain, nausea, vomiting, fever  for 2 days duration. Watery nonbloody nonfoul smelling diarrhea x 7 yesterday and 1 episode this am and NBNB vomiting x 7-8 yesterday and once today. For the past couple weeks pt has been having pain after meals, so has had a decreased PO intake. Did not trying anything for the pain and has never experienced this in the past. Endorses generalized weakness, fevers, chills. Denies recent abx usage, sick contacts, bloody stool, CP, SOB, dysuria. Pt has dialysis catheter changed 2 days ago back after it was accidently pulled out. Last dialysis session was Friday.     Upon arrival to ED patient febrile at 102.9, tachycardiac at 104 and normotensive and labs significant for WBC 23.96 30% bands elevated transaminitis, elevated tbili and elevated lactate x2, Cr 5.4.  procal elevated. UA neg Since presentation BP steadily dropping. BP dropping originally  now with SBP 98.  CT chest abd pelvis: acute cholecystitis  RVP:neg  CXR: increased congestions  Patient given vancomycin and zosyn, tylenol and 2L IVF. Hx obtained from daughter  Patient is a 68y old female pmhx CAD s/p stent (2007), ESRD on HD (MWF; missed today's session), DM, HTN, HLD brought in with complaints of constant "hard pressure like" abdominal pain, nausea, vomiting, fever  for 2 days duration. Watery nonbloody nonfoul smelling diarrhea x 7 yesterday and 1 episode this am and NBNB vomiting x 7-8 yesterday and once today. For the past couple weeks pt has been having pain after meals, so has had a decreased PO intake. Did not trying anything for the pain and has never experienced this in the past. Endorses generalized weakness, fevers, chills. Denies recent abx usage, sick contacts, bloody stool, CP, SOB, dysuria. Pt has dialysis catheter changed 2 days ago back after it was accidently pulled out. Last dialysis session was Friday.     Upon arrival to ED patient febrile at 102.9, tachycardiac at 104 and normotensive and labs significant for WBC 23.96 30% bands elevated transaminitis, elevated tbili and elevated lactate x2, Cr 5.4.  procal elevated. UA neg Since presentation BP steadily dropping. BP dropping originally  now with SBP 98. EKG: sinus tach  CT chest abd pelvis: acute cholecystitis  RVP:neg  CXR: increased congestions  Patient given vancomycin and zosyn, tylenol and 2L IVF.

## 2018-10-29 NOTE — H&P ADULT - NSHPSOCIALHISTORY_GEN_ALL_CORE
Social History:    Occupation: Retired  Lives with: (  ) alone  ( x ) children   (  ) spouse   (  ) parents  (  ) other  Substance Use (street drugs): ( x ) never used  (  ) other:  Tobacco Usage:  (  x ) never smoked   (   ) former smoker   (   ) current smoker  (     ) pack year  (        ) last cigarette date  Alcohol Usage: denies use

## 2018-10-29 NOTE — H&P ADULT - ASSESSMENT
68F with PMH of CAD s/p stent (2007), ESRD on HD (MWF; missed today's session), DM type 2, and HTN presents with fever and admitted with severe sepsis secondary to acute choledocholithiasis.

## 2018-10-29 NOTE — H&P ADULT - PROBLEM SELECTOR PLAN 4
- Plan for HD tomorrow   - Nephro consulted (Dr. Albarran) HD MWF. Patient missed HD today.   - Plan for HD tomorrow   - Nephro consulted (Dr. Albarran)

## 2018-10-29 NOTE — ED PROVIDER NOTE - OBJECTIVE STATEMENT
HO from daughter.  fever and weakness today.  Watery diarrhea x 7 yesterday and 1 episode this am. vomiting x 7-8 yesterday and once today.  no ho recent ab usage.  pmd- [place].

## 2018-10-29 NOTE — CONSULT NOTE ADULT - ASSESSMENT
·	ESRD on HD  ·	Weakness, Fever, R/o Sepsis  ·	Diabetes  ·	Hypertension    Awaiting labs done in ER. Decision on HD today pending labs. Check cultures, IV abx.   Monitor blood sugar levels. Insulin coverage as needed. Dietary restriction.   Monitor BP trend. Titrate BP meds as needed. Salt restriction. D/w family at bedside.  Further recommendations pending clinical course. Thank you for the courtesy of this referral.

## 2018-10-29 NOTE — H&P ADULT - NSHPOUTPATIENTPROVIDERS_GEN_ALL_CORE
PMD - Dr. Everardo Black ([facility])  Cardio - Dr. Yoel Mosher PMD - Dr. Everardo Black ([facility])  Cardio - Dr. Yoel Mosher  Nephro- Dr. Albarran

## 2018-10-29 NOTE — H&P ADULT - PROBLEM SELECTOR PLAN 3
s/p stent in 2007   - continue s/p stent in 2007. pt still on asa and plavix  - continue ASA  - will hold plavix for ercp tomorrow

## 2018-10-29 NOTE — H&P ADULT - PROBLEM SELECTOR PLAN 1
Admit to ICU.   CT shows acute calculus cholecystitis with biliary dilatation with questionable common bile duct stone.   - Continue IV abx   - Keep NPO, IVF while NPO   - MRCP/ERCP in AM   - Pain management   - Check acute hepatitis panel, amylase, lipase   - Trend lactate   - f/u blood and urine cx   - GI consulted (Dr. Tate) Admit to ICU.   CT shows acute calculus cholecystitis with biliary dilatation with questionable common bile duct stone.   - Continue IV abx  - Keep NPO  - MRCP/ERCP in AM   - Pain management   - Check acute hepatitis panel, amylase, lipase   - Trend lactate   - f/u blood and urine cx   - GI consulted (Dr. Tate) Admit to ICU.   CT shows acute calculus cholecystitis with biliary dilatation with questionable common bile duct stone.   - Continue IV abx  - Keep NPO  - MRCP/ERCP in AM   - Pain management   - Check acute hepatitis panel, amylase, lipase   - Trend lactate   - f/u blood and urine cx   - GI consulted (Dr. Tate)  -plan per ICU

## 2018-10-29 NOTE — CONSULT NOTE ADULT - SUBJECTIVE AND OBJECTIVE BOX
Patient is a 68y old female pmhx CAD s/p stent (2007), ESRD on HD (MWF; missed today's session), DM, HTN, HLD brought in with complaints of constant "hard pressure like" abdominal pain, nausea, vomiting, fever  for 2 days duration. Watery nonbloody nonfoul smelling diarrhea x 7 yesterday and 1 episode this am and NBNB vomiting x 7-8 yesterday and once today. For the past couple weeks pt has been having pain after meals, so has had a decreased PO intake. Did not trying anything for the pain and has never experienced this in the past. Endorses generalized weakness, fevers, chills. Denies recent abx usage, sick contacts, bloody stool, CP, SOB, dysuria. Pt has dialysis catheter changed 2 days ago back after it was accidently pulled out. Last dialysis session was Friday.     Upon arrival to ED patient febrile at 102.9, tachycardiac at 104 and normotensive and labs significant for WBC 23.96 30% bands elevated transaminitis, elevated tbili and elevated lactate x2, Cr 5.4.  procal elevated. UA neg Since presentation BP steadily dropping. BP dropping originally  now with SBP 98. EKG: sinus tach    ROS-unable to obtain    PAST MEDICAL & SURGICAL HISTORY:  ESRD (end stage renal disease) on dialysis: MWF  CAD (coronary artery disease): s/p stent in 2007  Diabetes  Myocardial infarction  Hypertension  H/O: hysterectomy    MEDICATIONS  (STANDING):  aspirin enteric coated 81 milliGRAM(s) Oral daily  calcium carbonate 1250 mG  + Vitamin D (OsCal 500 + D) 1 Tablet(s) Oral daily  dextrose 5%. 1000 milliLiter(s) (50 mL/Hr) IV Continuous <Continuous>  dextrose 50% Injectable 12.5 Gram(s) IV Push once  dextrose 50% Injectable 25 Gram(s) IV Push once  dextrose 50% Injectable 25 Gram(s) IV Push once  ferrous    sulfate 325 milliGRAM(s) Oral three times a day  insulin glargine Injectable (LANTUS) 12 Unit(s) SubCutaneous at bedtime  insulin lispro (HumaLOG) corrective regimen sliding scale   SubCutaneous every 6 hours  metoprolol tartrate 50 milliGRAM(s) Oral two times a day  pantoprazole  Injectable 40 milliGRAM(s) IV Push daily  sodium chloride 0.9% Bolus 1000 milliLiter(s) IV Bolus once    MEDICATIONS  (PRN):  dextrose 40% Gel 15 Gram(s) Oral once PRN Blood Glucose LESS THAN 70 milliGRAM(s)/deciliter  glucagon  Injectable 1 milliGRAM(s) IntraMuscular once PRN Glucose LESS THAN 70 milligrams/deciliter      .  VITAL SIGNS:  T(C): 37.7 (10-29-18 @ 15:50), Max: 39.6 (10-29-18 @ 14:40)  T(F): 99.8 (10-29-18 @ 15:50), Max: 103.2 (10-29-18 @ 14:40)  HR: 82 (10-29-18 @ 19:40) (82 - 104)  BP: 101/47 (10-29-18 @ 19:40) (101/47 - 174/77)  BP(mean): --  RR: 18 (10-29-18 @ 19:40) (18 - 22)  SpO2: 97% (10-29-18 @ 19:40) (90% - 99%)  Wt(kg): --      No Known Allergies    Intolerances    SH-negative    PHYSICAL EXAM:    Constitutional:  NAD, resting comfortably in bed  Head: NC/AT  Eyes: PERRL b/l  ENT: MMM  Neck: supple; no JVD or thyromegaly  Respiratory: CTA B/L   Cardiac: +S1/S2; RRR   Gastrointestinal: soft, ND, RUQ tenderness  Lymphatic: no submandibular, cervical or  LAD  Neurologic: AAOx3; CNII-XII grossly intact; no focal deficits  Psychiatric: affect and characteristics of appearance, verbalizations, behaviors are appropriate  Allergies                          11.7   23.96 )-----------( 241      ( 29 Oct 2018 15:30 )             37.9   10-29    136  |  100  |  49<H>  ----------------------------<  359<H>  5.1   |  24  |  5.40<H>    Ca    8.7      29 Oct 2018 15:30    TPro  7.7  /  Alb  2.6<L>  /  TBili  4.2<H>  /  DBili  x   /  AST  246<H>  /  ALT  136<H>  /  AlkPhos  300<H>  10-29    < from: CT Abdomen and Pelvis No Cont (10.29.18 @ 16:12) >  Constellation of findings suggestive of acute calculus cholecystitis in   the appropriate setting. Biliary dilatation with a questionable common   duct stone. Correlate with MRCP.    < end of copied text >

## 2018-10-29 NOTE — CONSULT NOTE ADULT - SUBJECTIVE AND OBJECTIVE BOX
Patient is a 68y old female pmhx CAD s/p stent (), ESRD on HD (MWF; missed today's session), DM, HTN, HLD biba with complaints of abdominal pain, nausea, vomiting, fever x2 days.  Upon arrival to ED patient febrile, normotensive and labs significant for transaminitis, elevated tbili and elevated lactate.  Since presentation BP steadily dropping.  Patient given vancomycin and zosyn, tylenol and 2L IVF.  Patient seen in ED, BP dropping originally  now with SBP 98.        PAST MEDICAL & SURGICAL HISTORY:  CAD (coronary artery disease)  Diabetes  CRF (chronic renal failure)  Hypertension  Pneumonia  Myocardial infarction  Hypertension  Diabetes  H/O: hysterectomy    Allergies    No Known Allergies    Intolerances      FAMILY HISTORY:  No pertinent family history in first degree relatives      Social History:     Review of Systems:  CONSTITUTIONAL: No fever, chills, or fatigue  EYES: No eye pain, visual disturbances, or discharge  ENMT:  No difficulty hearing, tinnitus, vertigo; No sinus or throat pain  NECK: No pain or stiffness  RESPIRATORY: No cough, wheezing, chills or hemoptysis; No shortness of breath  CARDIOVASCULAR: No chest pain, palpitations, dizziness, or leg swelling  GASTROINTESTINAL: No abdominal or epigastric pain. No nausea, vomiting, or hematemesis; No diarrhea or constipation. No melena or hematochezia.  GENITOURINARY: No dysuria, frequency, hematuria, or incontinence  NEUROLOGICAL: No headaches, memory loss, loss of strength, numbness, or tremors  SKIN: No itching, burning, rashes, or lesions   MUSCULOSKELETAL: No joint pain or swelling; No muscle, back, or extremity pain  PSYCHIATRIC: No depression, anxiety, mood swings, or difficulty sleeping      Vitals During Exam:   HR:   BP:   RR:  sPO2:     Physical Examination:    General: No acute distress.      HEENT: Pupils equal, reactive to light.  Symmetric.    PULM: Clear to auscultation bilaterally, no significant sputum production    CVS: Regular rate and rhythm, no murmurs, rubs, or gallops    ABD: Soft, nondistended, nontender, normoactive bowel sounds, no masses    EXT: No edema, nontender    SKIN: Warm and well perfused, no rashes noted.    NEURO: Alert, oriented, interactive, nonfocal      Medications:              pantoprazole  Injectable 40 milliGRAM(s) IV Push daily                      ICU Vital Signs Last 24 Hrs  T(C): 37.7 (29 Oct 2018 15:50), Max: 39.6 (29 Oct 2018 14:40)  T(F): 99.8 (29 Oct 2018 15:50), Max: 103.2 (29 Oct 2018 14:40)  HR: 86 (29 Oct 2018 17:45) (86 - 104)  BP: 105/47 (29 Oct 2018 17:45) (104/61 - 174/77)  BP(mean): --  ABP: --  ABP(mean): --  RR: 20 (29 Oct 2018 17:45) (20 - 22)  SpO2: 99% (29 Oct 2018 17:45) (90% - 99%)    Vital Signs Last 24 Hrs  T(C): 37.7 (29 Oct 2018 15:50), Max: 39.6 (29 Oct 2018 14:40)  T(F): 99.8 (29 Oct 2018 15:50), Max: 103.2 (29 Oct 2018 14:40)  HR: 86 (29 Oct 2018 17:45) (86 - 104)  BP: 105/47 (29 Oct 2018 17:45) (104/61 - 174/77)  BP(mean): --  RR: 20 (29 Oct 2018 17:45) (20 - 22)  SpO2: 99% (29 Oct 2018 17:45) (90% - 99%)        I&O's Detail        LABS:                        11.7   23.96 )-----------( 241      ( 29 Oct 2018 15:30 )             37.9     10    136  |  100  |  49<H>  ----------------------------<  359<H>  5.1   |  24  |  5.40<H>    Ca    8.7      29 Oct 2018 15:30    TPro  7.7  /  Alb  2.6<L>  /  TBili  4.2<H>  /  DBili  x   /  AST  246<H>  /  ALT  136<H>  /  AlkPhos  300<H>  10          CAPILLARY BLOOD GLUCOSE        PT/INR - ( 29 Oct 2018 15:30 )   PT: 18.6 sec;   INR: 1.69 ratio         PTT - ( 29 Oct 2018 15:30 )  PTT:31.3 sec  Urinalysis Basic - ( 29 Oct 2018 15:30 )    Color: Yellow / Appearance: Clear / S.005 / pH: x  Gluc: x / Ketone: Negative  / Bili: Small / Urobili: Negative   Blood: x / Protein: 500 mg/dL / Nitrite: Negative   Leuk Esterase: Negative / RBC: 3-5 /HPF / WBC 0-2   Sq Epi: x / Non Sq Epi: Few / Bacteria: Few      CULTURES:        RADIOLOGY: ***      SUPPLEMENTAL O2:   LINES:  BERMUDEZ:   PPx:   CONTACT: Patient is a 68y old female pmhx CAD s/p stent (), ESRD on HD (MWF; missed today's session), DM, HTN, HLD biba with complaints of abdominal pain, nausea, vomiting, fever x2 days.  Upon arrival to ED patient febrile, normotensive and labs significant for transaminitis, elevated tbili and elevated lactate.  Since presentation BP steadily dropping.  Patient given vancomycin and zosyn, tylenol and 2L IVF.  Patient seen in ED, BP dropping originally  now with SBP 98.  Patient appears ill and with tenuous BP patient to be transferred to ICU for further management. Patient endorses abdominal discomfort.        PAST MEDICAL & SURGICAL HISTORY:  CAD (coronary artery disease)  Diabetes  CRF (chronic renal failure)  Hypertension  Pneumonia  Myocardial infarction  Hypertension  Diabetes  H/O: hysterectomy    Allergies  No Known Allergies      FAMILY HISTORY:  No pertinent family history in first degree relatives      Social History:   From home. No hx of etoh, illicit drug or tobacco use.       Review of Systems:  All ROS negative except as noted in HPI.        Vitals During Exam:   HR: 86  BP: 105/47 mmHg  RR: 20  sPO2: 99% on NC    Physical Examination:    General: Elderly, overweight female, lying in bed, appears ill.     HEENT: NC/AT, Pupils equal, reactive to light.  Symmetric. NC in place. Oral mucosa dry.     PULM: Symmetrical thorax expansion upon respiration.  Clear to auscultation bilaterally, no significant sputum production appreciated.      CVS: Regular rate and rhythm, no murmurs, rubs, or gallops appreciated.     ABD: Soft, distended, +tender to palpation over right upper quadrant, normoactive bowel sounds, no masses appreciated.  +umbilical hernia that is reducible.  Exam limited secondary to distension and body habitus.      EXT: +1 pitting, nontender, DP pulses symmetrical.     SKIN: Warm and well perfused, no rashes noted. Poor skin turgor     NEURO: Alert, oriented, interactive, nonfocal, moving all 4 extremities.       Medications:  pantoprazole  Injectable 40 milliGRAM(s) IV Push daily      ICU Vital Signs Last 24 Hrs  T(C): 37.7 (29 Oct 2018 15:50), Max: 39.6 (29 Oct 2018 14:40)  T(F): 99.8 (29 Oct 2018 15:50), Max: 103.2 (29 Oct 2018 14:40)  HR: 86 (29 Oct 2018 17:45) (86 - 104)  BP: 105/47 (29 Oct 2018 17:45) (104/61 - 174/77)  BP(mean): --  ABP: --  ABP(mean): --  RR: 20 (29 Oct 2018 17:45) (20 - 22)  SpO2: 99% (29 Oct 2018 17:45) (90% - 99%)    Vital Signs Last 24 Hrs  T(C): 37.7 (29 Oct 2018 15:50), Max: 39.6 (29 Oct 2018 14:40)  T(F): 99.8 (29 Oct 2018 15:50), Max: 103.2 (29 Oct 2018 14:40)  HR: 86 (29 Oct 2018 17:45) (86 - 104)  BP: 105/47 (29 Oct 2018 17:45) (104/61 - 174/77)  BP(mean): --  RR: 20 (29 Oct 2018 17:45) (20 - 22)  SpO2: 99% (29 Oct 2018 17:45) (90% - 99%)      LABS:                        11.7   23.96 )-----------( 241      ( 29 Oct 2018 15:30 )             37.9     10-29    136  |  100  |  49<H>  ----------------------------<  359<H>  5.1   |  24  |  5.40<H>    Ca    8.7      29 Oct 2018 15:30    TPro  7.7  /  Alb  2.6<L>  /  TBili  4.2<H>  /  DBili  x   /  AST  246<H>  /  ALT  136<H>  /  AlkPhos  300<H>  10-29      PT/INR - ( 29 Oct 2018 15:30 )   PT: 18.6 sec;   INR: 1.69 ratio    PTT - ( 29 Oct 2018 15:30 )  PTT:31.3 sec      Urinalysis Basic - ( 29 Oct 2018 15:30 )  Color: Yellow / Appearance: Clear / S.005 / pH: x  Gluc: x / Ketone: Negative  / Bili: Small / Urobili: Negative   Blood: x / Protein: 500 mg/dL / Nitrite: Negative   Leuk Esterase: Negative / RBC: 3-5 /HPF / WBC 0-2   Sq Epi: x / Non Sq Epi: Few / Bacteria: Few      CULTURES:   Blood and urine cultures pending.  RVP pending.       RADIOLOGY:   < from: CT Abdomen and Pelvis No Cont (10.29.18 @ 16:12) >  EXAM:  CT ABDOMEN AND PELVIS                            PROCEDURE DATE:  10/29/2018          INTERPRETATION:  History: Fever weakness hypoxia.    CT chest abdomen and pelvis, no contrast.    Limited by lack of any oral or intravenous contrast.  Streaky atelectatic changes left base. No lobar consolidation or pleural   effusion.  Central airways patent. No mediastinal adenopathy. Normal caliber   thoracic aorta.  Mild cardiomegaly with coronary artery calcification. Gallbladder   markedly distended with calcified stones. Pericholecystic edema noted.   Findings are suggestive of acute cholecystitis in appropriate clinical   setting. Common duct dilated up to 1.5 cm, there is questionable distal   common duct stone. Correlate with MRCP. Unenhanced liver pancreas spleen   not remarkable.  Left renal cyst.  Abdominal aorta nonaneurysmal.  No suspicious adenopathy.  No evidence appendicitis obstructed or actively inflamed bowel. No   extraluminal fluid or gas.  Status post hysterectomy. Bladder not adequately distended.  Nonspecific subcutaneous induration lower anterior abdominal wall may   reflect scarring or edema/cellulitis.Correlate clinically.  Age-indeterminate T12 and L2 compression deformities.    Impression:    No intrathoracic source for fever.  Constellation of findings suggestive of acute calculus cholecystitis in   the appropriate setting. Biliary dilatation with a questionable common   duct stone. Correlate with MRCP.  Additional findings as discussed.    < end of copied text >      < from: Xray Chest 1 View-PORTABLE IMMEDIATE (10.29.18 @ 15:17) >  EXAM:  XR CHEST PORTABLE IMMED 1V                            PROCEDURE DATE:  10/29/2018        INTERPRETATION:  History: Sepsis, fever and weakness    Portable radiograph the chest was performed and compared to 5/3/2018.    The heart is not enlarged. Right-sided central venous catheter seen with   the tip overlying the right atrium. There is diffuse interstitial   prominence suggesting pulmonary vascular congestion. There is no focal   infiltrate. There is osteopenia of the bony structures.    Impression: Diffuse interstitial prominence suggesting pulmonary vascular   congestion without focal infiltrate noted.    < end of copied text >      SUPPLEMENTAL O2:  NC  LINES: Peripheral   BERMUDEZ: N  PPx: Pantoprazole.    CONTACT: N

## 2018-10-29 NOTE — H&P ADULT - NSHPREVIEWOFSYSTEMS_GEN_ALL_CORE
Constitutional: +fever, +chills, +general malaise, denies weight loss, weight gain, diaphoresis   HEENT: Denies sore throat, runny nose, photophobia, blurry vision, double vision, eye pain, difficulty hearing, dizziness, dysphagia, epistaxis  Respiratory: Denies shortness of breath, dyspnea on exertion, cough, sputum production, wheezing, hemoptysis  Cardiovascular: Denies chest pain, palpitations, +edema  Gastrointestinal: + nausea, vomiting, diarrhea, denies constipation, + abdominal pain, denies melena, hematochezia   Genitourinary: Denies dysuria, hematuria, frequency, urgency, incontinence  Skin/Breast: Denies rash, hives, itching  Musculoskeletal: Denies muscle pains, muscle weakness, joint pain or swelling  Neurologic: Denies syncope, loss of consciousness, headache, +weakness, denies dizziness, paresthesias, +diabetic neuropathy, denies confusion, dementia   Endocrine: Denies cold or heat intolerance, polydipsia, polyphagia   Hematology/Oncology: Denies abnormal bruising, tender or enlarged lymph nodes   ROS negative except as noted above

## 2018-10-30 DIAGNOSIS — A41.9 SEPSIS, UNSPECIFIED ORGANISM: ICD-10-CM

## 2018-10-30 PROBLEM — N18.9 CHRONIC KIDNEY DISEASE, UNSPECIFIED: Chronic | Status: INACTIVE | Noted: 2018-05-03 | Resolved: 2018-10-29

## 2018-10-30 PROBLEM — I10 ESSENTIAL (PRIMARY) HYPERTENSION: Chronic | Status: INACTIVE | Noted: 2018-05-03 | Resolved: 2018-10-29

## 2018-10-30 PROBLEM — J18.9 PNEUMONIA, UNSPECIFIED ORGANISM: Chronic | Status: INACTIVE | Noted: 2018-05-03 | Resolved: 2018-10-29

## 2018-10-30 PROBLEM — E11.9 TYPE 2 DIABETES MELLITUS WITHOUT COMPLICATIONS: Chronic | Status: INACTIVE | Noted: 2018-05-03 | Resolved: 2018-10-29

## 2018-10-30 LAB
ALBUMIN SERPL ELPH-MCNC: 1.9 G/DL — LOW (ref 3.3–5)
ALBUMIN SERPL ELPH-MCNC: 2.1 G/DL — LOW (ref 3.3–5)
ALP SERPL-CCNC: 319 U/L — HIGH (ref 40–120)
ALP SERPL-CCNC: 482 U/L — HIGH (ref 40–120)
ALT FLD-CCNC: 158 U/L — HIGH (ref 12–78)
ALT FLD-CCNC: 164 U/L — HIGH (ref 12–78)
AMYLASE P1 CFR SERPL: 19 U/L — LOW (ref 25–115)
ANION GAP SERPL CALC-SCNC: 11 MMOL/L — SIGNIFICANT CHANGE UP (ref 5–17)
ANION GAP SERPL CALC-SCNC: 12 MMOL/L — SIGNIFICANT CHANGE UP (ref 5–17)
APTT BLD: 34.1 SEC — SIGNIFICANT CHANGE UP (ref 27.5–37.4)
AST SERPL-CCNC: 279 U/L — HIGH (ref 15–37)
AST SERPL-CCNC: 295 U/L — HIGH (ref 15–37)
BASE EXCESS BLDA CALC-SCNC: -3.9 MMOL/L — LOW (ref -2–2)
BASE EXCESS BLDA CALC-SCNC: -5.1 MMOL/L — LOW (ref -2–2)
BASOPHILS # BLD AUTO: 0 K/UL — SIGNIFICANT CHANGE UP (ref 0–0.2)
BASOPHILS # BLD AUTO: 0 K/UL — SIGNIFICANT CHANGE UP (ref 0–0.2)
BASOPHILS NFR BLD AUTO: 0 % — SIGNIFICANT CHANGE UP (ref 0–2)
BASOPHILS NFR BLD AUTO: 0 % — SIGNIFICANT CHANGE UP (ref 0–2)
BILIRUB SERPL-MCNC: 3.9 MG/DL — HIGH (ref 0.2–1.2)
BILIRUB SERPL-MCNC: 4.2 MG/DL — HIGH (ref 0.2–1.2)
BLOOD GAS COMMENTS ARTERIAL: SIGNIFICANT CHANGE UP
BLOOD GAS COMMENTS ARTERIAL: SIGNIFICANT CHANGE UP
BUN SERPL-MCNC: 57 MG/DL — HIGH (ref 7–23)
BUN SERPL-MCNC: 57 MG/DL — HIGH (ref 7–23)
CALCIUM SERPL-MCNC: 7.6 MG/DL — LOW (ref 8.5–10.1)
CALCIUM SERPL-MCNC: 7.9 MG/DL — LOW (ref 8.5–10.1)
CHLORIDE SERPL-SCNC: 106 MMOL/L — SIGNIFICANT CHANGE UP (ref 96–108)
CHLORIDE SERPL-SCNC: 106 MMOL/L — SIGNIFICANT CHANGE UP (ref 96–108)
CK MB BLD-MCNC: 1.7 % — SIGNIFICANT CHANGE UP (ref 0–3.5)
CK MB CFR SERPL CALC: 1.8 NG/ML — SIGNIFICANT CHANGE UP (ref 0–3.6)
CK MB CFR SERPL CALC: 2.7 NG/ML — SIGNIFICANT CHANGE UP (ref 0–3.6)
CK MB CFR SERPL CALC: 3.1 NG/ML — SIGNIFICANT CHANGE UP (ref 0–3.6)
CK SERPL-CCNC: 104 U/L — SIGNIFICANT CHANGE UP (ref 26–192)
CK SERPL-CCNC: 155 U/L — SIGNIFICANT CHANGE UP (ref 26–192)
CK SERPL-CCNC: 179 U/L — SIGNIFICANT CHANGE UP (ref 26–192)
CO2 SERPL-SCNC: 22 MMOL/L — SIGNIFICANT CHANGE UP (ref 22–31)
CO2 SERPL-SCNC: 23 MMOL/L — SIGNIFICANT CHANGE UP (ref 22–31)
CREAT SERPL-MCNC: 5.9 MG/DL — HIGH (ref 0.5–1.3)
CREAT SERPL-MCNC: 6 MG/DL — HIGH (ref 0.5–1.3)
CULTURE RESULTS: NO GROWTH — SIGNIFICANT CHANGE UP
E COLI DNA BLD POS QL NAA+NON-PROBE: SIGNIFICANT CHANGE UP
EOSINOPHIL # BLD AUTO: 0 K/UL — SIGNIFICANT CHANGE UP (ref 0–0.5)
EOSINOPHIL # BLD AUTO: 0 K/UL — SIGNIFICANT CHANGE UP (ref 0–0.5)
EOSINOPHIL NFR BLD AUTO: 0 % — SIGNIFICANT CHANGE UP (ref 0–6)
EOSINOPHIL NFR BLD AUTO: 0 % — SIGNIFICANT CHANGE UP (ref 0–6)
GLUCOSE SERPL-MCNC: 129 MG/DL — HIGH (ref 70–99)
GLUCOSE SERPL-MCNC: 144 MG/DL — HIGH (ref 70–99)
GRAM STN FLD: SIGNIFICANT CHANGE UP
GRAM STN FLD: SIGNIFICANT CHANGE UP
HAV IGM SER-ACNC: SIGNIFICANT CHANGE UP
HBA1C BLD-MCNC: 9.6 % — HIGH (ref 4–5.6)
HBV CORE IGM SER-ACNC: SIGNIFICANT CHANGE UP
HBV SURFACE AG SER-ACNC: SIGNIFICANT CHANGE UP
HCO3 BLDA-SCNC: 20 MMOL/L — LOW (ref 23–27)
HCO3 BLDA-SCNC: 21 MMOL/L — LOW (ref 23–27)
HCT VFR BLD CALC: 35.8 % — SIGNIFICANT CHANGE UP (ref 34.5–45)
HCT VFR BLD CALC: 36.4 % — SIGNIFICANT CHANGE UP (ref 34.5–45)
HCV AB S/CO SERPL IA: 0.1 S/CO — SIGNIFICANT CHANGE UP
HCV AB SERPL-IMP: SIGNIFICANT CHANGE UP
HGB BLD-MCNC: 10.8 G/DL — LOW (ref 11.5–15.5)
HGB BLD-MCNC: 11.1 G/DL — LOW (ref 11.5–15.5)
HOROWITZ INDEX BLDA+IHG-RTO: SIGNIFICANT CHANGE UP
INR BLD: 2.05 RATIO — HIGH (ref 0.88–1.16)
LACTATE SERPL-SCNC: 0.9 MMOL/L — SIGNIFICANT CHANGE UP (ref 0.7–2)
LACTATE SERPL-SCNC: 2.5 MMOL/L — HIGH (ref 0.7–2)
LACTATE SERPL-SCNC: 2.7 MMOL/L — HIGH (ref 0.7–2)
LIDOCAIN IGE QN: 51 U/L — LOW (ref 73–393)
LYMPHOCYTES # BLD AUTO: 0 % — LOW (ref 13–44)
LYMPHOCYTES # BLD AUTO: 0 K/UL — LOW (ref 1–3.3)
LYMPHOCYTES # BLD AUTO: 10 % — LOW (ref 13–44)
LYMPHOCYTES # BLD AUTO: 2.46 K/UL — SIGNIFICANT CHANGE UP (ref 1–3.3)
MAGNESIUM SERPL-MCNC: 1.8 MG/DL — SIGNIFICANT CHANGE UP (ref 1.6–2.6)
MAGNESIUM SERPL-MCNC: 1.9 MG/DL — SIGNIFICANT CHANGE UP (ref 1.6–2.6)
MCHC RBC-ENTMCNC: 27.2 PG — SIGNIFICANT CHANGE UP (ref 27–34)
MCHC RBC-ENTMCNC: 27.3 PG — SIGNIFICANT CHANGE UP (ref 27–34)
MCHC RBC-ENTMCNC: 30.2 GM/DL — LOW (ref 32–36)
MCHC RBC-ENTMCNC: 30.5 GM/DL — LOW (ref 32–36)
MCV RBC AUTO: 89.2 FL — SIGNIFICANT CHANGE UP (ref 80–100)
MCV RBC AUTO: 90.6 FL — SIGNIFICANT CHANGE UP (ref 80–100)
METHOD TYPE: SIGNIFICANT CHANGE UP
MONOCYTES # BLD AUTO: 0 K/UL — SIGNIFICANT CHANGE UP (ref 0–0.9)
MONOCYTES # BLD AUTO: 0.25 K/UL — SIGNIFICANT CHANGE UP (ref 0–0.9)
MONOCYTES NFR BLD AUTO: 0 % — LOW (ref 2–14)
MONOCYTES NFR BLD AUTO: 1 % — LOW (ref 2–14)
NEUTROPHILS # BLD AUTO: 13.58 K/UL — HIGH (ref 1.8–7.4)
NEUTROPHILS # BLD AUTO: 20.94 K/UL — HIGH (ref 1.8–7.4)
NEUTROPHILS NFR BLD AUTO: 48 % — SIGNIFICANT CHANGE UP (ref 43–77)
NEUTROPHILS NFR BLD AUTO: 69 % — SIGNIFICANT CHANGE UP (ref 43–77)
PCO2 BLDA: 49 MMHG — HIGH (ref 32–46)
PCO2 BLDA: 52 MMHG — HIGH (ref 32–46)
PH BLDA: 7.24 — LOW (ref 7.35–7.45)
PH BLDA: 7.27 — LOW (ref 7.35–7.45)
PHOSPHATE SERPL-MCNC: 4.4 MG/DL — SIGNIFICANT CHANGE UP (ref 2.5–4.5)
PHOSPHATE SERPL-MCNC: 4.4 MG/DL — SIGNIFICANT CHANGE UP (ref 2.5–4.5)
PLATELET # BLD AUTO: 158 K/UL — SIGNIFICANT CHANGE UP (ref 150–400)
PLATELET # BLD AUTO: 165 K/UL — SIGNIFICANT CHANGE UP (ref 150–400)
PO2 BLDA: 117 MMHG — HIGH (ref 74–108)
PO2 BLDA: 133 MMHG — HIGH (ref 74–108)
POTASSIUM SERPL-MCNC: 4.4 MMOL/L — SIGNIFICANT CHANGE UP (ref 3.5–5.3)
POTASSIUM SERPL-MCNC: 4.4 MMOL/L — SIGNIFICANT CHANGE UP (ref 3.5–5.3)
POTASSIUM SERPL-SCNC: 4.4 MMOL/L — SIGNIFICANT CHANGE UP (ref 3.5–5.3)
POTASSIUM SERPL-SCNC: 4.4 MMOL/L — SIGNIFICANT CHANGE UP (ref 3.5–5.3)
PROT SERPL-MCNC: 6.1 G/DL — SIGNIFICANT CHANGE UP (ref 6–8.3)
PROT SERPL-MCNC: 6.7 G/DL — SIGNIFICANT CHANGE UP (ref 6–8.3)
PROTHROM AB SERPL-ACNC: 22.7 SEC — HIGH (ref 9.8–12.7)
RBC # BLD: 3.95 M/UL — SIGNIFICANT CHANGE UP (ref 3.8–5.2)
RBC # BLD: 4.08 M/UL — SIGNIFICANT CHANGE UP (ref 3.8–5.2)
RBC # FLD: 16.9 % — HIGH (ref 10.3–14.5)
RBC # FLD: 17.1 % — HIGH (ref 10.3–14.5)
SAO2 % BLDA: 97 % — HIGH (ref 92–96)
SAO2 % BLDA: 98 % — HIGH (ref 92–96)
SODIUM SERPL-SCNC: 140 MMOL/L — SIGNIFICANT CHANGE UP (ref 135–145)
SODIUM SERPL-SCNC: 140 MMOL/L — SIGNIFICANT CHANGE UP (ref 135–145)
SPECIMEN SOURCE: SIGNIFICANT CHANGE UP
TROPONIN I SERPL-MCNC: 0.31 NG/ML — HIGH (ref 0.01–0.04)
TROPONIN I SERPL-MCNC: 0.35 NG/ML — HIGH (ref 0.01–0.04)
TROPONIN I SERPL-MCNC: 0.37 NG/ML — HIGH (ref 0.01–0.04)
WBC # BLD: 13.58 K/UL — HIGH (ref 3.8–10.5)
WBC # BLD: 24.63 K/UL — HIGH (ref 3.8–10.5)
WBC # FLD AUTO: 13.58 K/UL — HIGH (ref 3.8–10.5)
WBC # FLD AUTO: 24.63 K/UL — HIGH (ref 3.8–10.5)

## 2018-10-30 PROCEDURE — 47490 INCISION OF GALLBLADDER: CPT

## 2018-10-30 PROCEDURE — 93010 ELECTROCARDIOGRAM REPORT: CPT

## 2018-10-30 PROCEDURE — 99233 SBSQ HOSP IP/OBS HIGH 50: CPT

## 2018-10-30 PROCEDURE — 99291 CRITICAL CARE FIRST HOUR: CPT

## 2018-10-30 PROCEDURE — 71045 X-RAY EXAM CHEST 1 VIEW: CPT | Mod: 26

## 2018-10-30 RX ORDER — HYDROMORPHONE HYDROCHLORIDE 2 MG/ML
1 INJECTION INTRAMUSCULAR; INTRAVENOUS; SUBCUTANEOUS ONCE
Qty: 0 | Refills: 0 | Status: DISCONTINUED | OUTPATIENT
Start: 2018-10-30 | End: 2018-10-30

## 2018-10-30 RX ORDER — METOPROLOL TARTRATE 50 MG
5 TABLET ORAL ONCE
Qty: 0 | Refills: 0 | Status: COMPLETED | OUTPATIENT
Start: 2018-10-30 | End: 2018-10-30

## 2018-10-30 RX ORDER — ONDANSETRON 8 MG/1
4 TABLET, FILM COATED ORAL ONCE
Qty: 0 | Refills: 0 | Status: COMPLETED | OUTPATIENT
Start: 2018-10-30 | End: 2018-10-30

## 2018-10-30 RX ORDER — AMIODARONE HYDROCHLORIDE 400 MG/1
150 TABLET ORAL ONCE
Qty: 0 | Refills: 0 | Status: COMPLETED | OUTPATIENT
Start: 2018-10-30 | End: 2018-10-30

## 2018-10-30 RX ORDER — DIGOXIN 250 MCG
0.5 TABLET ORAL ONCE
Qty: 0 | Refills: 0 | Status: COMPLETED | OUTPATIENT
Start: 2018-10-30 | End: 2018-10-30

## 2018-10-30 RX ORDER — SODIUM CHLORIDE 9 MG/ML
500 INJECTION, SOLUTION INTRAVENOUS
Qty: 0 | Refills: 0 | Status: DISCONTINUED | OUTPATIENT
Start: 2018-10-30 | End: 2018-10-30

## 2018-10-30 RX ORDER — SODIUM CHLORIDE 9 MG/ML
1000 INJECTION, SOLUTION INTRAVENOUS ONCE
Qty: 0 | Refills: 0 | Status: COMPLETED | OUTPATIENT
Start: 2018-10-30 | End: 2018-10-30

## 2018-10-30 RX ORDER — ACETAMINOPHEN 500 MG
1000 TABLET ORAL ONCE
Qty: 0 | Refills: 0 | Status: COMPLETED | OUTPATIENT
Start: 2018-10-30 | End: 2018-10-30

## 2018-10-30 RX ORDER — NOREPINEPHRINE BITARTRATE/D5W 8 MG/250ML
0.05 PLASTIC BAG, INJECTION (ML) INTRAVENOUS
Qty: 8 | Refills: 0 | Status: DISCONTINUED | OUTPATIENT
Start: 2018-10-30 | End: 2018-10-30

## 2018-10-30 RX ORDER — PHYTONADIONE (VIT K1) 5 MG
5 TABLET ORAL ONCE
Qty: 0 | Refills: 0 | Status: COMPLETED | OUTPATIENT
Start: 2018-10-30 | End: 2018-10-30

## 2018-10-30 RX ORDER — URSODIOL 250 MG/1
300 TABLET, FILM COATED ORAL EVERY 12 HOURS
Qty: 0 | Refills: 0 | Status: DISCONTINUED | OUTPATIENT
Start: 2018-10-30 | End: 2018-11-16

## 2018-10-30 RX ORDER — CHLORHEXIDINE GLUCONATE 213 G/1000ML
1 SOLUTION TOPICAL DAILY
Qty: 0 | Refills: 0 | Status: DISCONTINUED | OUTPATIENT
Start: 2018-10-30 | End: 2018-11-01

## 2018-10-30 RX ORDER — HYDROMORPHONE HYDROCHLORIDE 2 MG/ML
0.5 INJECTION INTRAMUSCULAR; INTRAVENOUS; SUBCUTANEOUS EVERY 4 HOURS
Qty: 0 | Refills: 0 | Status: DISCONTINUED | OUTPATIENT
Start: 2018-10-30 | End: 2018-10-31

## 2018-10-30 RX ORDER — METOPROLOL TARTRATE 50 MG
5 TABLET ORAL EVERY 6 HOURS
Qty: 0 | Refills: 0 | Status: DISCONTINUED | OUTPATIENT
Start: 2018-10-30 | End: 2018-10-31

## 2018-10-30 RX ORDER — PHENYLEPHRINE HYDROCHLORIDE 10 MG/ML
0.4 INJECTION INTRAVENOUS
Qty: 40 | Refills: 0 | Status: DISCONTINUED | OUTPATIENT
Start: 2018-10-30 | End: 2018-10-31

## 2018-10-30 RX ADMIN — Medication 8.85 MICROGRAM(S)/KG/MIN: at 07:50

## 2018-10-30 RX ADMIN — PIPERACILLIN AND TAZOBACTAM 25 GRAM(S): 4; .5 INJECTION, POWDER, LYOPHILIZED, FOR SOLUTION INTRAVENOUS at 22:35

## 2018-10-30 RX ADMIN — AMIODARONE HYDROCHLORIDE 412 MILLIGRAM(S): 400 TABLET ORAL at 18:16

## 2018-10-30 RX ADMIN — Medication 1000 MILLIGRAM(S): at 03:30

## 2018-10-30 RX ADMIN — Medication 2: at 12:57

## 2018-10-30 RX ADMIN — HYDROMORPHONE HYDROCHLORIDE 0.5 MILLIGRAM(S): 2 INJECTION INTRAMUSCULAR; INTRAVENOUS; SUBCUTANEOUS at 20:37

## 2018-10-30 RX ADMIN — HEPARIN SODIUM 5000 UNIT(S): 5000 INJECTION INTRAVENOUS; SUBCUTANEOUS at 22:35

## 2018-10-30 RX ADMIN — CHLORHEXIDINE GLUCONATE 1 APPLICATION(S): 213 SOLUTION TOPICAL at 13:06

## 2018-10-30 RX ADMIN — HYDROMORPHONE HYDROCHLORIDE 0.5 MILLIGRAM(S): 2 INJECTION INTRAMUSCULAR; INTRAVENOUS; SUBCUTANEOUS at 12:56

## 2018-10-30 RX ADMIN — PANTOPRAZOLE SODIUM 40 MILLIGRAM(S): 20 TABLET, DELAYED RELEASE ORAL at 12:57

## 2018-10-30 RX ADMIN — HYDROMORPHONE HYDROCHLORIDE 0.5 MILLIGRAM(S): 2 INJECTION INTRAMUSCULAR; INTRAVENOUS; SUBCUTANEOUS at 13:11

## 2018-10-30 RX ADMIN — Medication 101 MILLIGRAM(S): at 09:20

## 2018-10-30 RX ADMIN — HYDROMORPHONE HYDROCHLORIDE 0.5 MILLIGRAM(S): 2 INJECTION INTRAMUSCULAR; INTRAVENOUS; SUBCUTANEOUS at 21:07

## 2018-10-30 RX ADMIN — Medication 1 TABLET(S): at 12:56

## 2018-10-30 RX ADMIN — SODIUM CHLORIDE 2000 MILLILITER(S): 9 INJECTION, SOLUTION INTRAVENOUS at 06:44

## 2018-10-30 RX ADMIN — PHENYLEPHRINE HYDROCHLORIDE 14.16 MICROGRAM(S)/KG/MIN: 10 INJECTION INTRAVENOUS at 18:17

## 2018-10-30 RX ADMIN — Medication 0.5 MILLIGRAM(S): at 18:56

## 2018-10-30 RX ADMIN — HYDROMORPHONE HYDROCHLORIDE 1 MILLIGRAM(S): 2 INJECTION INTRAMUSCULAR; INTRAVENOUS; SUBCUTANEOUS at 02:07

## 2018-10-30 RX ADMIN — PIPERACILLIN AND TAZOBACTAM 25 GRAM(S): 4; .5 INJECTION, POWDER, LYOPHILIZED, FOR SOLUTION INTRAVENOUS at 01:49

## 2018-10-30 RX ADMIN — Medication 400 MILLIGRAM(S): at 03:01

## 2018-10-30 RX ADMIN — HYDROMORPHONE HYDROCHLORIDE 1 MILLIGRAM(S): 2 INJECTION INTRAMUSCULAR; INTRAVENOUS; SUBCUTANEOUS at 02:37

## 2018-10-30 RX ADMIN — Medication 5 MILLIGRAM(S): at 18:43

## 2018-10-30 RX ADMIN — ONDANSETRON 4 MILLIGRAM(S): 8 TABLET, FILM COATED ORAL at 10:55

## 2018-10-30 NOTE — PROGRESS NOTE ADULT - SUBJECTIVE AND OBJECTIVE BOX
Patient is a 68y old  Female who presents with a chief complaint of Sepsis (29 Oct 2018 20:13)    24 hour events: ***  PAST MEDICAL & SURGICAL HISTORY:  ESRD (end stage renal disease) on dialysis: MWF  CAD (coronary artery disease): s/p stent in   Diabetes  Myocardial infarction  Hypertension  H/O: hysterectomy      Review of Systems:  Constitutional: No fever, chills, fatigue  Neuro: No headache, numbness, weakness  Resp: No cough, wheezing, shortness of breath  CVS: No chest pain, palpitations, leg swelling  GI: No abdominal pain, nausea, vomiting, diarrhea   : No dysuria, frequency, incontinence  Skin: No itching, burning, rashes, or lesions   Msk: No joint pain or swelling  Psych: No depression, anxiety, mood swings    T(F): 98.1 (10-30-18 @ 00:10), Max: 103.2 (10-29-18 @ 14:40)  HR: 101 (10-30-18 @ 03:00) (79 - 112)  BP: 159/70 (10-30-18 @ 03:00) (97/55 - 200/86)  RR: 19 (10-30-18 @ 03:00) (15 - 46)  SpO2: 93% (10-30-18 @ 03:00) (90% - 99%)  Wt(kg): --        CAPILLARY BLOOD GLUCOSE      POCT Blood Glucose.: 304 mg/dL (29 Oct 2018 21:50)      I&O's Summary    29 Oct 2018 07:01  -  30 Oct 2018 03:52  --------------------------------------------------------  IN: 4000 mL / OUT: 0 mL / NET: 4000 mL        Physical Exam:     Gen:   Neuro: A&Ox3, non-focal  HEENT: NC/AT  Resp: CTA b/l  CVS: nl S1/S2, RRR  Abd: soft, nt, nd, +bs  Ext: no edema, +pulses  Skin: well perfused, warm    Meds:  piperacillin/tazobactam IVPB. IV Intermittent    metoprolol tartrate Oral    dextrose 40% Gel Oral PRN  dextrose 50% Injectable IV Push  dextrose 50% Injectable IV Push  dextrose 50% Injectable IV Push  glucagon  Injectable IntraMuscular PRN  insulin glargine Injectable (LANTUS) SubCutaneous  insulin lispro (HumaLOG) corrective regimen sliding scale SubCutaneous          heparin  Injectable SubCutaneous    pantoprazole  Injectable IV Push      calcium carbonate 1250 mG  + Vitamin D (OsCal 500 + D) Oral  dextrose 5%. IV Continuous  ferrous    sulfate Oral                                  11.7   23.96 )-----------( 241      ( 29 Oct 2018 15:30 )             37.9     Bands 30.0    10-29    136  |  100  |  49<H>  ----------------------------<  359<H>  5.1   |  24  |  5.40<H>    Ca    8.7      29 Oct 2018 15:30    TPro  7.7  /  Alb  2.6<L>  /  TBili  4.2<H>  /  DBili  x   /  AST  246<H>  /  ALT  136<H>  /  AlkPhos  300<H>  10-29    Lactate 2.2           10-29 @ 17:13    Lactate 2.3           10-29 @ 15:30          PT/INR - ( 29 Oct 2018 15:30 )   PT: 18.6 sec;   INR: 1.69 ratio         PTT - ( 29 Oct 2018 15:30 )  PTT:31.3 sec  Urinalysis Basic - ( 29 Oct 2018 15:30 )    Color: Yellow / Appearance: Clear / S.005 / pH: x  Gluc: x / Ketone: Negative  / Bili: Small / Urobili: Negative   Blood: x / Protein: 500 mg/dL / Nitrite: Negative   Leuk Esterase: Negative / RBC: 3-5 /HPF / WBC 0-2   Sq Epi: x / Non Sq Epi: Few / Bacteria: Few      .Blood Blood-Peripheral   Growth in aerobic and anaerobic bottles: Gram Negative Rods   Growth in aerobic and anaerobic bottles: Gram Negative Rods 10-29 @ 19:05  .Blood Blood-Peripheral   Growth in aerobic and anaerobic bottles: Gram Negative Rods  "Due to technical problems, Proteus sp. will Not be reported as part of  the BCID panel until further notice"  ***Blood Panel PCR results on this specimen are available  approximately 3 hours after the Gram stain result.***  Gram stain, PCR, and/or culture results may not always  correspond due to difference in methodologies.  ************************************************************  This PCR assay was performed using [employer].  The following targets are tested for: Enterococcus,  vancomycin resistant enterococci, Listeria monocytogenes,  coagulase negative staphylococci, S. aureus,  methicillin resistant S. aureus, Streptococcus agalactiae  (Group B), S. pneumoniae, S. pyogenes (Group A),  Acinetobacter baumannii, Enterobacter cloacae, E. coli,  Klebsiella oxytoca, K. pneumoniae, Proteus sp.,  Serratia marcescens, Haemophilus influenzae,  Neisseria meningitidis, Pseudomonas aeruginosa, Candida  albicans, C. glabrata, C krusei, C parapsilosis,  C. tropicalis and the KPC resistance gene.   Growth in aerobic and anaerobic bottles: Gram Negative Rods 10-29 @ 19:03      Rapid RVP Result: NotDetec (10-29 @ 15:30)      Radiology: ***    Bedside lung ultrasound: ***    Bedside ECHO: ***    CENTRAL LINE: Y/N          DATE INSERTED:              REMOVE: Y/N    BERMUDEZ: Y/N                        DATE INSERTED:              REMOVE: Y/N    A-LINE: Y/N                       DATE INSERTED:              REMOVE: Y/N    GLOBAL ISSUE/BEST PRACTICE:  Analgesia:  Sedation:  HOB elevation: yes  Stress ulcer prophylaxis:  VTE prophylaxis:  Glycemic control:  Nutrition:      CODE STATUS: ***  [facility] discussion: Y  Critical care time spent (mins): ***  (Reviewing data, imaging, discussing with multidisciplinary team, non inclusive of procedures, discussing goals of care with patient/family)    Critical Care time: ***assessing presenting problems of acute illness that poses high probability of life threatening deterioration or end organ damage/dysfunction.  Medical decision making inculding Initiating plan of care, reviewing data, reviewing radiology,direct patient bedside evaluation and interpretation of vital signs, any necessary ventilator management , discusion with multidisciplinary team, discussing goals of care with patient/family, all non inclusive of procedures Patient is a 68y old  Female who presents with a chief complaint of Sepsis (29 Oct 2018 20:13)    Patient is a 68y old female pmhx CAD s/p stent (), ESRD on HD (MWF; missed today's session), DM, HTN, HLD biba with complaints of abdominal pain, nausea, vomiting, fever x2 days.  Upon arrival to ED patient febrile, normotensive and labs significant for transaminitis, elevated tbili and elevated lactate.  Since presentation BP steadily dropping.  Patient given vancomycin and zosyn, tylenol and 2L IVF.  Patient seen in ED, BP dropping originally  now with SBP 98.  Patient appears ill and with tenuous BP patient to be transferred to ICU for severe sepsis likely 2/2 to acute cholecystis        24 hour events: Pt found have GNR bacteremia ON with 30 % bandemia this AM, s/p 3L IVF bolus, pain 10/10 ON controlled with 1mg IVP of Dilaudid and IV Tylenol, pt became tachypneic with notable belly breathing ON. Pt may have obstructive component vs. tachypnea 2/2 to severe pain.  When pt pain was controlled and repositioned in bed breathing improved.  SBP began to fall early in AM to 80's and pt became lethargic, pt was given 1L LR.  SBP dropped to 70's, pt was subsequently started on levophed for BP support, and placed on BiPAP on / 40%.  ABG shown to have a mixed respiratory and metabolic acidosis.  Once BP improved with pressors mental status markedly improved.  Pt likely in mixed Type 2/hypercapnic and type 4 (shock) respiratory failure. Source control required with IR perc or surgical removal of gallbladder.         PAST MEDICAL & SURGICAL HISTORY:  ESRD (end stage renal disease) on dialysis: MWF  CAD (coronary artery disease): s/p stent in   Diabetes  Myocardial infarction  Hypertension  H/O: hysterectomy      Review of Systems:  Constitutional: No fever, chills, fatigue  Neuro: No headache, numbness, weakness  Resp: No cough, wheezing, shortness of breath  CVS: No chest pain, palpitations, leg swelling  GI: + R sided abdominal pain, + nausea, No vomiting, diarrhea   : No dysuria, frequency, incontinence  Skin: No itching, burning, rashes, or lesions   Msk: No joint pain or swelling  Psych: No depression, anxiety, mood swings    T(F): 98.1 (10-30-18 @ 00:10), Max: 103.2 (10-29-18 @ 14:40)  HR: 101 (10-30-18 @ 03:00) (79 - 112)  BP: 159/70 (10-30-18 @ 03:00) (97/55 - 200/86)  RR: 19 (10-30-18 @ 03:00) (15 - 46)  SpO2: 93% (10-30-18 @ 03:00) (90% - 99%)  Wt(kg): --        CAPILLARY BLOOD GLUCOSE      POCT Blood Glucose.: 304 mg/dL (29 Oct 2018 21:50)      I&O's Summary    29 Oct 2018 07:01  -  30 Oct 2018 03:52  --------------------------------------------------------  IN: 4000 mL / OUT: 0 mL / NET: 4000 mL        Physical Exam:     Gen: awake and alert, in distress 2/2 to abdominal pain, the became lethargic early in AM   Neuro: A&Ox3, non-focal  HEENT: NC/AT  Resp: CTA b/l  CVS: nl S1/S2, RRR  Abd: soft, severe RUQ tenderness with radiating pain in all other quadrants to palpation   Ext: no edema, +pulses  Skin: well perfused, warm    Meds:  piperacillin/tazobactam IVPB. IV Intermittent  metoprolol tartrate Oral  dextrose 40% Gel Oral PRN  dextrose 50% Injectable IV Push  dextrose 50% Injectable IV Push  dextrose 50% Injectable IV Push  glucagon  Injectable IntraMuscular PRN  insulin glargine Injectable (LANTUS) SubCutaneous  insulin lispro (HumaLOG) corrective regimen sliding scale SubCutaneous  heparin  Injectable SubCutaneous  pantoprazole  Injectable IV Push  calcium carbonate 1250 mG  + Vitamin D (OsCal 500 + D) Oral  dextrose 5%. IV Continuous  ferrous    sulfate Oral                                  11.7   23.96 )-----------( 241      ( 29 Oct 2018 15:30 )             37.9     Bands 30.0    10-29    136  |  100  |  49<H>  ----------------------------<  359<H>  5.1   |  24  |  5.40<H>    Ca    8.7      29 Oct 2018 15:30    TPro  7.7  /  Alb  2.6<L>  /  TBili  4.2<H>  /  DBili  x   /  AST  246<H>  /  ALT  136<H>  /  AlkPhos  300<H>  10-29    Lactate 2.2           10-29 @ 17:13    Lactate 2.3           10-29 @ 15:30          PT/INR - ( 29 Oct 2018 15:30 )   PT: 18.6 sec;   INR: 1.69 ratio         PTT - ( 29 Oct 2018 15:30 )  PTT:31.3 sec  Urinalysis Basic - ( 29 Oct 2018 15:30 )    Color: Yellow / Appearance: Clear / S.005 / pH: x  Gluc: x / Ketone: Negative  / Bili: Small / Urobili: Negative   Blood: x / Protein: 500 mg/dL / Nitrite: Negative   Leuk Esterase: Negative / RBC: 3-5 /HPF / WBC 0-2   Sq Epi: x / Non Sq Epi: Few / Bacteria: Few      .Blood Blood-Peripheral   Growth in aerobic and anaerobic bottles: Gram Negative Rods   Growth in aerobic and anaerobic bottles: Gram Negative Rods 10-29 @ 19:05  .Blood Blood-Peripheral   Growth in aerobic and anaerobic bottles: Gram Negative Rods  "Due to technical problems, Proteus sp. will Not be reported as part of  the BCID panel until further notice"  ***Blood Panel PCR results on this specimen are available  approximately 3 hours after the Gram stain result.***  Gram stain, PCR, and/or culture results may not always  correspond due to difference in methodologies.  ************************************************************  This PCR assay was performed using [employer].  The following targets are tested for: Enterococcus,  vancomycin resistant enterococci, Listeria monocytogenes,  coagulase negative staphylococci, S. aureus,  methicillin resistant S. aureus, Streptococcus agalactiae  (Group B), S. pneumoniae, S. pyogenes (Group A),  Acinetobacter baumannii, Enterobacter cloacae, E. coli,  Klebsiella oxytoca, K. pneumoniae, Proteus sp.,  Serratia marcescens, Haemophilus influenzae,  Neisseria meningitidis, Pseudomonas aeruginosa, Candida  albicans, C. glabrata, C krusei, C parapsilosis,  C. tropicalis and the KPC resistance gene.   Growth in aerobic and anaerobic bottles: Gram Negative Rods 10-29 @ 19:03      Rapid RVP Result: NotDetec (10-29 @ 15:30)      Radiology:< from: MR MRCP No Cont (10.29.18 @ 18:52) >  IMPRESSION:    Acute cholecystitis with a 2.5 cm stone at the gallbladder neck.    13 mm common bile duct without evidence for choledocholithiasis.        < end of copied text >      Bedside lung ultrasound: Alma Delia predominantly BL, nonnoted effusions at bases         CENTRAL LINE: N          DATE INSERTED:              REMOVE: Y/N    BERMUDEZ: N                        DATE INSERTED:              REMOVE: Y/N    A-LINE: N                       DATE INSERTED:              REMOVE: Y/N    GLOBAL ISSUE/BEST PRACTICE:  Analgesia: tylenol/dilaudid   Sedation: N/A  HOB elevation: yes  Stress ulcer prophylaxis:  VTE prophylaxis: heparin SQ   Glycemic control: ISS   Nutrition: NPO      CODE STATUS:F Full Code   GOC discussion: Y      Critical Care time: 40 min assessing presenting problems of acute illness that poses high probability of life threatening deterioration or end organ damage/dysfunction.  Medical decision making including Initiating plan of care, reviewing data, reviewing radiology, direct patient bedside evaluation and interpretation of vital signs, any necessary ventilator management , discusion with multidisciplinary team, discussing goals of care with patient/family, all non inclusive of procedures CC: Patient is a 68y old  Female who presents with a chief complaint of Sepsis (29 Oct 2018 20:13)    BRIEF HOSPITAL COURSE:  Patient is a 68y old female pmhx CAD s/p stent (), ESRD on HD (MWF; missed today's session), DM, HTN, HLD biba with complaints of abdominal pain, nausea, vomiting, fever x2 days.  Upon arrival to ED patient febrile, normotensive and labs significant for transaminitis, elevated tbili and elevated lactate.  Since presentation BP steadily dropping.  Patient given vancomycin and zosyn, tylenol and 2L IVF.  Patient seen in ED, BP dropping originally  now with SBP 98.  Patient appears ill and with tenuous BP patient to be transferred to ICU for severe sepsis likely 2/2 to acute cholecystis        24 hour events: Pt found have MRCP performed and reported to have, "Acute cholecystitis with a 2.5 cm stone at the gallbladder neck, 13 mm common bile duct without evidence for choledocholithiasis." Pt found to have GNR bacteremia ON with 30 % bandemia this AM, s/p 3L IVF bolus, pain 10/10 ON controlled with 1mg IVP of Dilaudid and IV Tylenol, pt became tachypneic with notable belly breathing O/N. Pt may have obstructive component vs. tachypnea 2/2 to severe pain.  When pt pain was controlled and repositioned in bed breathing improved. For the majority of the night pt was normotensive to hypertensive in the setting of severe abdominal pain. Around 6:30 SBP began to fall early in AM to 80's and pt became lethargic, pt was given 1L LR.  SBP dropped to 70's, pt was subsequently started on levophed for BP support, and placed on BiPAP on 12/5 40%.  ABG shown to have a mixed respiratory and metabolic acidosis.  Once BP improved with pressors mental status markedly improved.  Pt likely in mixed Type 2/hypercapnic and type 4 (shock) respiratory failure. Source control required with IR perc or surgical removal of gallbladder.         PAST MEDICAL & SURGICAL HISTORY:  ESRD (end stage renal disease) on dialysis: MWF  CAD (coronary artery disease): s/p stent in   Diabetes  Myocardial infarction  Hypertension  H/O: hysterectomy      Review of Systems:  Constitutional: No fever, chills, fatigue  Neuro: No headache, numbness, weakness  Resp: No cough, wheezing, shortness of breath  CVS: No chest pain, palpitations, leg swelling  GI: + R sided abdominal pain, + nausea, No vomiting, diarrhea   : No dysuria, frequency, incontinence  Skin: No itching, burning, rashes, or lesions   Msk: No joint pain or swelling  Psych: No depression, anxiety, mood swings    T(F): 98.1 (10-30-18 @ 00:10), Max: 103.2 (10-29-18 @ 14:40)  HR: 101 (10-30-18 @ 03:00) (79 - 112)  BP: 159/70 (10-30-18 @ 03:00) (97/55 - 200/86)  RR: 19 (10-30-18 @ 03:00) (15 - 46)  SpO2: 93% (10-30-18 @ 03:00) (90% - 99%)  Wt(kg): --        CAPILLARY BLOOD GLUCOSE      POCT Blood Glucose.: 304 mg/dL (29 Oct 2018 21:50)      I&O's Summary    29 Oct 2018 07:01  -  30 Oct 2018 03:52  --------------------------------------------------------  IN: 4000 mL / OUT: 0 mL / NET: 4000 mL        Physical Exam:     Gen: awake and alert, in distress 2/2 to abdominal pain, the became lethargic early in AM   Neuro: A&Ox3, non-focal  HEENT: NC/AT  Resp: CTA b/l  CVS: nl S1/S2, RRR  Abd: soft, severe RUQ tenderness with radiating pain in all other quadrants to palpation   Ext: no edema, +pulses  Skin: well perfused, warm    Meds:  piperacillin/tazobactam IVPB. IV Intermittent  metoprolol tartrate Oral  dextrose 40% Gel Oral PRN  dextrose 50% Injectable IV Push  dextrose 50% Injectable IV Push  dextrose 50% Injectable IV Push  glucagon  Injectable IntraMuscular PRN  insulin glargine Injectable (LANTUS) SubCutaneous  insulin lispro (HumaLOG) corrective regimen sliding scale SubCutaneous  heparin  Injectable SubCutaneous  pantoprazole  Injectable IV Push  calcium carbonate 1250 mG  + Vitamin D (OsCal 500 + D) Oral  dextrose 5%. IV Continuous  ferrous    sulfate Oral                                  11.7   23.96 )-----------( 241      ( 29 Oct 2018 15:30 )             37.9     Bands 30.0    10-29    136  |  100  |  49<H>  ----------------------------<  359<H>  5.1   |  24  |  5.40<H>    Ca    8.7      29 Oct 2018 15:30    TPro  7.7  /  Alb  2.6<L>  /  TBili  4.2<H>  /  DBili  x   /  AST  246<H>  /  ALT  136<H>  /  AlkPhos  300<H>  10-29    Lactate 2.2           10-29 @ 17:13    Lactate 2.3           10-29 @ 15:30          PT/INR - ( 29 Oct 2018 15:30 )   PT: 18.6 sec;   INR: 1.69 ratio         PTT - ( 29 Oct 2018 15:30 )  PTT:31.3 sec  Urinalysis Basic - ( 29 Oct 2018 15:30 )    Color: Yellow / Appearance: Clear / S.005 / pH: x  Gluc: x / Ketone: Negative  / Bili: Small / Urobili: Negative   Blood: x / Protein: 500 mg/dL / Nitrite: Negative   Leuk Esterase: Negative / RBC: 3-5 /HPF / WBC 0-2   Sq Epi: x / Non Sq Epi: Few / Bacteria: Few      .Blood Blood-Peripheral   Growth in aerobic and anaerobic bottles: Gram Negative Rods   Growth in aerobic and anaerobic bottles: Gram Negative Rods 10-29 @ 19:05  .Blood Blood-Peripheral   Growth in aerobic and anaerobic bottles: Gram Negative Rods  "Due to technical problems, Proteus sp. will Not be reported as part of  the BCID panel until further notice"  ***Blood Panel PCR results on this specimen are available  approximately 3 hours after the Gram stain result.***  Gram stain, PCR, and/or culture results may not always  correspond due to difference in methodologies.  ************************************************************  This PCR assay was performed using [employer].  The following targets are tested for: Enterococcus,  vancomycin resistant enterococci, Listeria monocytogenes,  coagulase negative staphylococci, S. aureus,  methicillin resistant S. aureus, Streptococcus agalactiae  (Group B), S. pneumoniae, S. pyogenes (Group A),  Acinetobacter baumannii, Enterobacter cloacae, E. coli,  Klebsiella oxytoca, K. pneumoniae, Proteus sp.,  Serratia marcescens, Haemophilus influenzae,  Neisseria meningitidis, Pseudomonas aeruginosa, Candida  albicans, C. glabrata, C krusei, C parapsilosis,  C. tropicalis and the KPC resistance gene.   Growth in aerobic and anaerobic bottles: Gram Negative Rods 10-29 @ 19:03      Rapid RVP Result: NotDetec (10-29 @ 15:30)      Radiology:< from: MR MRCP No Cont (10.29.18 @ 18:52) >  IMPRESSION:    Acute cholecystitis with a 2.5 cm stone at the gallbladder neck.    13 mm common bile duct without evidence for choledocholithiasis.        < end of copied text >      Bedside lung ultrasound: Alma Delia predominantly BL, nonnoted effusions at bases         CENTRAL LINE: N          DATE INSERTED:              REMOVE: Y/N    BERMUDEZ: N                        DATE INSERTED:              REMOVE: Y/N    A-LINE: N                       DATE INSERTED:              REMOVE: Y/N    GLOBAL ISSUE/BEST PRACTICE:  Analgesia: tylenol/dilaudid   Sedation: N/A  HOB elevation: yes  Stress ulcer prophylaxis:  VTE prophylaxis: heparin SQ   Glycemic control: ISS   Nutrition: NPO      CODE STATUS:F Full Code   GOC discussion: Y      Critical Care time: 40 min assessing presenting problems of acute illness that poses high probability of life threatening deterioration or end organ damage/dysfunction.  Medical decision making including Initiating plan of care, reviewing data, reviewing radiology, direct patient bedside evaluation and interpretation of vital signs, any necessary ventilator management , discusion with multidisciplinary team, discussing goals of care with patient/family, all non inclusive of procedures

## 2018-10-30 NOTE — DIETITIAN INITIAL EVALUATION ADULT. - PROBLEM SELECTOR PLAN 1
Admit to ICU.   CT shows acute calculus cholecystitis with biliary dilatation with questionable common bile duct stone.   - Continue IV abx  - Keep NPO  - MRCP/ERCP in AM   - Pain management   - Check acute hepatitis panel, amylase, lipase   - Trend lactate   - f/u blood and urine cx   - GI consulted (Dr. Tate)  -plan per ICU

## 2018-10-30 NOTE — PROGRESS NOTE ADULT - ASSESSMENT
68y old female pmhx CAD s/p stent (2007), ESRD on HD (MWF; missed today's session), DM, HTN, HLD admitted with severe sepsis, now septic shock acute cholecystitis vs. cholangeitis, GNR bacteremia, bandemia 30%, respiratory failure leading to AMS     Plan     1. Severe sepsis, now septic shock acute cholecystitis vs. cholangeitis, GNR bacteremia, bandemia 30%  -s/p 3L IVF bolus  - pain 10/10 ON controlled with 1mg IVP of Dilaudid and IV Tylenol,   -SBP began to fall early in AM to 80's and pt became lethargic, pt was given 1L LR.  SBP dropped to 70's, pt was subsequently started on levophed for BP support  -30 cc/kg fluid challenge without improvement in blood pressure  -patient currently on Levophed, will titrate for MAP 65-70  -repeat lactate  -blood/urine/sputum cultures, then initiate broad spectrum antibiotics  -follow cultures and narrow antibiotics as able  -goal UOP >0.5 cc/kg/hr  -procalcitonin    2.  AMS/hypercapnic vs. type 4 (shock) RF   -Pt started on BiPAP at 12/6 for ventilation and WOB   -ABG with mixed respiratory and metabolic acidosis   -Mental status markedly improved with improvement of BP   -Will full up with repeat ABG at 10 am   -No need for intubation at this time, but given tenuous state will have low threshold 68y old female pmhx CAD s/p stent (2007), ESRD on HD (MWF; missed today's session), DM, HTN, HLD admitted with severe sepsis, now septic shock acute cholecystitis vs. cholangeitis, GNR bacteremia, bandemia 30%, respiratory failure leading to AMS     Plan     1. Severe sepsis, now septic shock acute cholecystitis vs. cholangeitis, GNR bacteremia, bandemia 30%  -s/p 3L IVF bolus  - pain 10/10 ON controlled with 1mg IVP of Dilaudid and IV Tylenol,   -SBP began to fall early in AM to 80's and pt became lethargic, pt was given 1L LR.  SBP dropped to 70's, pt was subsequently started on levophed for BP support  -30 cc/kg fluid challenge without improvement in blood pressure  -patient currently on Levophed, will titrate for MAP 65-70  -repeat lactate  -blood/urine/sputum cultures, then initiate broad spectrum antibiotics  -follow cultures and narrow antibiotics as able  -goal UOP >0.5 cc/kg/hr  -procalcitonin  -pt with acute kevin/choledocholetithiasis  -ERCP vs surgery  -as per surgery team high risk for surgery   -NPO      2.  Hypercapnic vs. type 4 (shock) RF   -Pt started on BiPAP at 12/6 for ventilation and WOB   -ABG with mixed respiratory and metabolic acidosis   -Mental status markedly improved with improvement of BP   -Will full up with repeat ABG at 10 am   -No need for intubation at this time, but given tenuous state will have low threshold      3. AMS   -lethargy and obtundation likely in the setting of septic shock with hypotension and hypercapnia   -Mental status improved upon pressor support and BiPAP 68y old female pmhx CAD s/p stent (2007), ESRD on HD (MWF; missed today's session), DM, HTN, HLD admitted with severe sepsis, now septic shock acute cholecystitis vs. cholangeitis, GNR bacteremia, bandemia 30%, respiratory failure leading to AMS     Plan     1. Severe sepsis, now septic shock acute cholecystitis vs. cholangeitis, GNR bacteremia, bandemia 30%  -s/p 3L IVF bolus  - pain 10/10 ON controlled with 1mg IVP of Dilaudid and IV Tylenol,   -SBP began to fall early in AM to 80's and pt became lethargic, pt was given 1L LR.  SBP dropped to 70's, pt was subsequently started on levophed for BP support  -30 cc/kg fluid challenge without improvement in blood pressure  -patient currently on Levophed, will titrate for MAP 65-70  -repeat lactate  -blood/urine/sputum cultures, then initiate broad spectrum antibiotics  -follow cultures and narrow antibiotics as able  -goal UOP >0.5 cc/kg/hr  -procalcitonin  -lactate remains increased: 2.3, 2.2, now 2.5  -leukocytosis with bandemia slightly improved (30% --> 31%) WBC downtrending this AM     2.  Hypercapnic vs. Type 4 (shock) RF   -O/N pt noted to have abdominal breathing that improved with pain control in abdomen  -Pt is also obese with short neck, may have obstructive component LIZZY vs. obesity  -Around 6:45 am pt became acutely obtunded likely contributing to hypercapnia in the setting of hypoventilation   -Pt received 1 mg IVP of Dilaudid and IV Tylenol around 2am, however hypercapnia in AM likely not drug induced as she was arousable alert alert and fully oriented around 5-6 am    -Mixed RF in the setting of acute shock state with very abrupt change in mental status when SBP fell to 70's, Type 4 RF (shock)  -Pt started on BiPAP at 12/6 for ventilation and WOB   -ABG with mixed respiratory and metabolic acidosis   -Mental status markedly improved with improvement of BP   -Will full up with repeat ABG at 10 am   -No need for intubation at this time, but given tenuous state will have low threshold      3. AMS   -lethargy and obtundation likely in the setting of septic shock with hypotension and hypercapnia   -Mental status improved upon pressor support and BiPAP     4.  ESRD on HD   -Pt due for dialysis today 10/30    5. Acute cholecystitis vs. Cholangeitis, with GNR bacteremia   -CT noted to have distended gallbladder with calcified stones, pericholecystic edema, and gallbladder wall thickening consistent with acute cholecystitis. Common duct dilated up to 1.5 cm with questionable distal common duct stone.  -MRCP: acute cholecystitis with a 2.5 cm stone at the gallbladder neck and 13 mm common bile duct without evidence for choledocholithiasis  -Tbili increased at 4.2, will continue to trend  -F/u with GI for ERCP,   -Source control plan IR per olvin vs. surgical intervention, (as per surgery high risk for surgery)    5.  Transaminitis   -LFTs increased.  to 295 and  to 164, alk phos increased from 300 to 482 will cont to trend   -GI recs appreciated

## 2018-10-30 NOTE — PROGRESS NOTE ADULT - ASSESSMENT
68 female with a history of HTN, CAD, DM, PVD, ESRD on HD now admitted with fever, right upper quadrant pain and found to have sepsis secondary to acute cholecystitis. Now on ABX and also pressors for hypotension. Will dialyze her today via perma cath and later IR drainage. Spoke to patient and family at bed side. Will follow.

## 2018-10-30 NOTE — PROGRESS NOTE ADULT - SUBJECTIVE AND OBJECTIVE BOX
Patient is a 68y old  Female who presents with a chief complaint of Sepsis (30 Oct 2018 03:52)    24 hour events:   --febrile at 103 on admission, now afebrile at 99.8  --lactate remains increased: 2.3 --> 2.2 --> 2.5  --leukocytosis with bandemia (30% --> 31%), lactic acidosis, transaminitis, elevated alk phos 300 with Tbili 4.2.   --WBC improving from 23.96 --> 13.58  --1 dose of vanco IV given, continued on zosyn 3.375 g IV q12h  --CT: distended gallbladder with calcified stones, pericholecystic edema, and gallbladder wall thickening consistent with acute cholecystitis. Common duct dilated up to 1.5 cm with questionable distal common duct stone.  --MRCP: showed acute cholecystitis with a 2.5 cm stone at the gallbladder neck and 13 mm common bile duct without evidence for choledocholithiasis.  --received 2 liters NS in ED, but then repeat BP 96/40 consistent with severe sepsis and impending shock .Received another 1L of NS and 1L LR last night (10/29). Hypertensive overnight with SBP 170s. Became hypotensive this morning (10/30). Given 1L LR IV and levophed. AM ABG showed 7.24/52/133/20/98/-5.1   --blood cultures (10/29): positive for gram negative rods  --received dilaudid and tylenol in ED for pain    REVIEW OF SYSTEMS  Constitutional: No fever, chills, fatigue  Neuro: No headache, numbness, weakness  Resp: No cough, wheezing, shortness of breath  CVS: No chest pain, palpitations, leg swelling  GI: No abdominal pain, nausea, vomiting, diarrhea   : No dysuria, frequency, incontinence  Skin: No itching, burning, rashes, or lesions   Msk: No joint pain or swelling  Psych: No depression, anxiety, mood swings  Heme: No bleeding    T(F): 99.8 (10-30-18 @ 04:18), Max: 103.2 (10-29-18 @ 14:40)  HR: 77 (10-30-18 @ 07:00) (77 - 112)  BP: 70/42 (10-30-18 @ 07:00) (70/42 - 200/86)  RR: 12 (10-30-18 @ 07:00) (12 - 46)  SpO2: 97% (10-30-18 @ 07:00) (90% - 99%)  Wt(kg): --      CAPILLARY BLOOD GLUCOSE      POCT Blood Glucose.: 130 mg/dL (30 Oct 2018 05:51)  POCT Blood Glucose.: 304 mg/dL (29 Oct 2018 21:50)  POCT Blood Glucose.: 313 mg/dL (29 Oct 2018 19:28)    I&O's Summary    10-29 @ 07:  -  10-30 @ 07:00  --------------------------------------------------------  IN: 5280 mL / OUT: 0 mL / NET: 5280 mL      PHYSICAL EXAM  General:   CNS:   HEENT:   Resp:   CVS:   Abd:   Ext:   Skin:     MEDICATIONS  piperacillin/tazobactam IVPB. IV Intermittent  metoprolol tartrate Oral  norepinephrine Infusion IV Continuous  dextrose 40% Gel Oral PRN  dextrose 50% Injectable IV Push  dextrose 50% Injectable IV Push  dextrose 50% Injectable IV Push  glucagon  Injectable IntraMuscular PRN  insulin glargine Injectable (LANTUS) SubCutaneous  insulin lispro (HumaLOG) corrective regimen sliding scale SubCutaneous  heparin  Injectable SubCutaneous  pantoprazole  Injectable IV Push  calcium carbonate 1250 mG  + Vitamin D (OsCal 500 + D) Oral  dextrose 5%. IV Continuous  ferrous    sulfate Oral                          10.8   13.58 )-----------( 165      ( 30 Oct 2018 05:49 )             35.8     Bands 31.0  Bands 30.0    10-30    140  |  106  |  57<H>  ----------------------------<  129<H>  4.4   |  22  |  6.00<H>    Ca    7.6<L>      30 Oct 2018 05:49  Phos  4.4     10  Mg     1.8     10-30    TPro  6.7  /  Alb  2.1<L>  /  TBili  4.2<H>  /  DBili  x   /  AST  295<H>  /  ALT  164<H>  /  AlkPhos  482<H>  10-30    Lactate 2.5           10-30 @ 05:47  Lactate 2.2           10-29 @ 17:13  Lactate 2.3           10-29 @ 15:30        PT/INR - ( 30 Oct 2018 05:49 )   PT: 22.7 sec;   INR: 2.05 ratio       PTT - ( 30 Oct 2018 05:49 )  PTT:34.1 sec  Urinalysis Basic - ( 29 Oct 2018 15:30 )    Color: Yellow / Appearance: Clear / S.005 / pH: x  Gluc: x / Ketone: Negative  / Bili: Small / Urobili: Negative   Blood: x / Protein: 500 mg/dL / Nitrite: Negative   Leuk Esterase: Negative / RBC: 3-5 /HPF / WBC 0-2   Sq Epi: x / Non Sq Epi: Few / Bacteria: Few      .Blood Blood-Peripheral   Growth in aerobic and anaerobic bottles: Gram Negative Rods   Growth in aerobic and anaerobic bottles: Gram Negative Rods 10-29 @ 19:05  .Blood Blood-Peripheral   Growth in aerobic and anaerobic bottles: Gram Negative Rods  "Due to technical problems, Proteus sp. will Not be reported as part of  the BCID panel until further notice"  ***Blood Panel PCR results on this specimen are available  approximately 3 hours after the Gram stain result.***  Gram stain, PCR, and/or culture results may not always  correspond due to difference in methodologies.  ************************************************************  This PCR assay was performed using [employer].  The following targets are tested for: Enterococcus,  vancomycin resistant enterococci, Listeria monocytogenes,  coagulase negative staphylococci, S. aureus,  methicillin resistant S. aureus, Streptococcus agalactiae  (Group B), S. pneumoniae, S. pyogenes (Group A),  Acinetobacter baumannii, Enterobacter cloacae, E. coli,  Klebsiella oxytoca, K. pneumoniae, Proteus sp.,  Serratia marcescens, Haemophilus influenzae,  Neisseria meningitidis, Pseudomonas aeruginosa, Candida  albicans, C. glabrata, C krusei, C parapsilosis,  C. tropicalis and the KPC resistance gene.   Growth in aerobic and anaerobic bottles: Gram Negative Rods 10-29 @ 19:03      Rapid RVP Result: NotDetec (10-29 @ 15:30)    Radiology:   < from: CT Abdomen and Pelvis No Cont (10.29.18 @ 16:12) >  Impression:  No intrathoracic source for fever.  Constellation of findings suggestive of acute calculus cholecystitis in   the appropriate setting. Biliary dilatation with a questionable common   duct stone. Correlate with MRCP.  Additional findings as discussed.    < from: MR MRCP No Cont (10.29.18 @ 18:52) >  IMPRESSION:  Acute cholecystitis with a 2.5 cm stone at the gallbladder neck.  13 mm common bile duct without evidence for choledocholithiasis.    < from: Xray Chest 1 View-PORTABLE IMMEDIATE (10.29.18 @ 15:17) >  Impression: Diffuse interstitial prominence suggesting pulmonary vascular   congestion without focal infiltrate noted.      Bedside lung ultrasound: ***  Bedside ECHO: ***    CENTRAL LINE: Y         DATE INSERTED:  10/30          REMOVE: Y/N  BERMUDEZ: Y/N                        DATE INSERTED:              REMOVE: Y/N  A-LINE: Y/N                       DATE INSERTED:              REMOVE: Y/N    GLOBAL ISSUE/BEST PRACTICE  Analgesia:   Sedation:   CAM-ICU:   HOB elevation: yes  Stress ulcer prophylaxis:   VTE prophylaxis:   Glycemic control:   Nutrition:     CODE STATUS: ***  [facility] discussion: Y Patient is a 68y old  Female who presents with a chief complaint of Sepsis (30 Oct 2018 03:52)    24 hour events:   patient admitted to ICU  --febrile at 103 on admission, now afebrile at 99.8  --lactate remains increased: 2.3 --> 2.2 --> 2.5  --leukocytosis with bandemia (30% --> 31%), lactic acidosis, transaminitis, elevated alk phos 300 with Tbili 4.2.   --WBC improving from 23.96 --> 13.58  --1 dose of vanco IV given, continued on zosyn 3.375 g IV q12h  --CT: distended gallbladder with calcified stones, pericholecystic edema, and gallbladder wall thickening consistent with acute cholecystitis. Common duct dilated up to 1.5 cm with questionable distal common duct stone.  --MRCP: showed acute cholecystitis with a 2.5 cm stone at the gallbladder neck and 13 mm common bile duct without evidence for choledocholithiasis.  --received 2 liters NS in ED, but then repeat BP 96/40 consistent with severe sepsis and impending shock .Received another 1L of NS and 1L LR last night (10/29). Hypertensive overnight with SBP 170s. Became hypotensive this morning (10/30). Given 1L LR IV and levophed. AM ABG showed 7.24/52/133/20/98/-5.1   --blood cultures (10/29): positive for gram negative rods  --received dilaudid and tylenol in ED for pain    REVIEW OF SYSTEMS  Unable to obtain meaningful ROS due to mental status      T(F): 99.8 (10-30-18 @ 04:18), Max: 103.2 (10-29-18 @ 14:40)  HR: 77 (10-30-18 @ 07:00) (77 - 112)  BP: 70/42 (10-30-18 @ 07:00) (70/42 - 200/86)  RR: 12 (10-30-18 @ 07:00) (12 - 46)  SpO2: 97% (10-30-18 @ 07:00) (90% - 99%)  Wt(kg): --      CAPILLARY BLOOD GLUCOSE      POCT Blood Glucose.: 130 mg/dL (30 Oct 2018 05:51)  POCT Blood Glucose.: 304 mg/dL (29 Oct 2018 21:50)  POCT Blood Glucose.: 313 mg/dL (29 Oct 2018 19:28)    I&O's Summary    10-29 @ 07:  -  10-30 @ 07:00  --------------------------------------------------------  IN: 5280 mL / OUT: 0 mL / NET: 5280 mL      PHYSICAL EXAM  General:   CNS:   HEENT:   Resp:   CVS:   Abd:   Ext:   Skin:     MEDICATIONS  piperacillin/tazobactam IVPB. IV Intermittent  metoprolol tartrate Oral  norepinephrine Infusion IV Continuous  dextrose 40% Gel Oral PRN  dextrose 50% Injectable IV Push  dextrose 50% Injectable IV Push  dextrose 50% Injectable IV Push  glucagon  Injectable IntraMuscular PRN  insulin glargine Injectable (LANTUS) SubCutaneous  insulin lispro (HumaLOG) corrective regimen sliding scale SubCutaneous  heparin  Injectable SubCutaneous  pantoprazole  Injectable IV Push  calcium carbonate 1250 mG  + Vitamin D (OsCal 500 + D) Oral  dextrose 5%. IV Continuous  ferrous    sulfate Oral                          10.8   13.58 )-----------( 165      ( 30 Oct 2018 05:49 )             35.8     Bands 31.0  Bands 30.0    10-30    140  |  106  |  57<H>  ----------------------------<  129<H>  4.4   |  22  |  6.00<H>    Ca    7.6<L>      30 Oct 2018 05:49  Phos  4.4     10-30  Mg     1.8     10-30    TPro  6.7  /  Alb  2.1<L>  /  TBili  4.2<H>  /  DBili  x   /  AST  295<H>  /  ALT  164<H>  /  AlkPhos  482<H>  10-30    Lactate 2.5           10-30 @ 05:47  Lactate 2.2           10-29 @ 17:13  Lactate 2.3           10-29 @ 15:30        PT/INR - ( 30 Oct 2018 05:49 )   PT: 22.7 sec;   INR: 2.05 ratio       PTT - ( 30 Oct 2018 05:49 )  PTT:34.1 sec  Urinalysis Basic - ( 29 Oct 2018 15:30 )    Color: Yellow / Appearance: Clear / S.005 / pH: x  Gluc: x / Ketone: Negative  / Bili: Small / Urobili: Negative   Blood: x / Protein: 500 mg/dL / Nitrite: Negative   Leuk Esterase: Negative / RBC: 3-5 /HPF / WBC 0-2   Sq Epi: x / Non Sq Epi: Few / Bacteria: Few      .Blood Blood-Peripheral   Growth in aerobic and anaerobic bottles: Gram Negative Rods   Growth in aerobic and anaerobic bottles: Gram Negative Rods 10-29 @ 19:05  .Blood Blood-Peripheral   Growth in aerobic and anaerobic bottles: Gram Negative Rods  "Due to technical problems, Proteus sp. will Not be reported as part of  the BCID panel until further notice"  ***Blood Panel PCR results on this specimen are available  approximately 3 hours after the Gram stain result.***  Gram stain, PCR, and/or culture results may not always  correspond due to difference in methodologies.  ************************************************************  This PCR assay was performed using [employer].  The following targets are tested for: Enterococcus,  vancomycin resistant enterococci, Listeria monocytogenes,  coagulase negative staphylococci, S. aureus,  methicillin resistant S. aureus, Streptococcus agalactiae  (Group B), S. pneumoniae, S. pyogenes (Group A),  Acinetobacter baumannii, Enterobacter cloacae, E. coli,  Klebsiella oxytoca, K. pneumoniae, Proteus sp.,  Serratia marcescens, Haemophilus influenzae,  Neisseria meningitidis, Pseudomonas aeruginosa, Candida  albicans, C. glabrata, C krusei, C parapsilosis,  C. tropicalis and the KPC resistance gene.   Growth in aerobic and anaerobic bottles: Gram Negative Rods 10-29 @ 19:03      Rapid RVP Result: NotDetec (10-29 @ 15:30)    Radiology:   < from: CT Abdomen and Pelvis No Cont (10.29.18 @ 16:12) >  Impression:  No intrathoracic source for fever.  Constellation of findings suggestive of acute calculus cholecystitis in   the appropriate setting. Biliary dilatation with a questionable common   duct stone. Correlate with MRCP.  Additional findings as discussed.    < from: MR MRCP No Cont (10.29.18 @ 18:52) >  IMPRESSION:  Acute cholecystitis with a 2.5 cm stone at the gallbladder neck.  13 mm common bile duct without evidence for choledocholithiasis.    < from: Xray Chest 1 View-PORTABLE IMMEDIATE (10.29.18 @ 15:17) >  Impression: Diffuse interstitial prominence suggesting pulmonary vascular   congestion without focal infiltrate noted.      Bedside lung ultrasound: ***  Bedside ECHO: ***    CENTRAL LINE: Y Left IJ        DATE INSERTED:  10/30          REMOVE: N  BERMUDEZ: N (Patient is ESRD on dialysis)                         A-LINE: N                           GLOBAL ISSUE/BEST PRACTICE  Analgesia: Y  Sedation: N  HOB elevation: yes  Stress ulcer prophylaxis:   VTE prophylaxis:   Glycemic control:   Nutrition:     CODE STATUS: ***  [facility] discussion: Y Patient is a 68y old  Female who presents with a chief complaint of Sepsis (30 Oct 2018 03:52)    24 hour events:   --patient admitted to ICU  --febrile at 103 on admission, now afebrile at 99.8  --CT: distended gallbladder with calcified stones, pericholecystic edema, and gallbladder wall thickening consistent with acute cholecystitis. Common duct dilated up to 1.5 cm with questionable distal common duct stone.  --MRCP: showed acute cholecystitis with a 2.5 cm stone at the gallbladder neck and 13 mm common bile duct without evidence for choledocholithiasis.  --blood cultures (10/29): positive for gram negative rods  --received 2 liters NS in ED, repeat BP 96/40 consistent with severe sepsis and impending shock. Received another 1L of NS and 1L LR last night (10/29). Hypertensive overnight with SBP 170s. Became hypotensive this morning (10/30). Given 1L LR IV and levophed. AM ABG showed 7.24/52/133/20/98/-5.1   --lactate remains increased: 2.3 --> 2.2 --> 2.5  --leukocytosis with bandemia (30% --> 31%), lactic acidosis, transaminitis, elevated alk phos 300 with Tbili 4.2.   --WBC improving from 23.96 --> 13.58  --1 dose of vanco IV given, continued on zosyn 3.375 g IV q12h  --received dilaudid and tylenol in ED for pain    REVIEW OF SYSTEMS  Unable to obtain meaningful ROS due to mental status      T(F): 99.8 (10-30-18 @ 04:18), Max: 103.2 (10-29-18 @ 14:40)  HR: 77 (10-30-18 @ 07:00) (77 - 112)  BP: 70/42 (10-30-18 @ 07:00) (70/42 - 200/86)  RR: 12 (10-30-18 @ 07:00) (12 - 46)  SpO2: 97% (10-30-18 @ 07:00) (90% - 99%)  Wt(kg): --      CAPILLARY BLOOD GLUCOSE      POCT Blood Glucose.: 130 mg/dL (30 Oct 2018 05:51)  POCT Blood Glucose.: 304 mg/dL (29 Oct 2018 21:50)  POCT Blood Glucose.: 313 mg/dL (29 Oct 2018 19:28)    I&O's Summary    10-29 @ 07:  -  10-30 @ 07:00  --------------------------------------------------------  IN: 5280 mL / OUT: 0 mL / NET: 5280 mL      PHYSICAL EXAM  General: Awake. Altered mental status.   HEENT: NCAT.  Resp: lungs cta b/l. no w/r/r.  CVS: RRR. S1 and S2 noted. no m/r/g.  Abd: RUQ tenderness. Abd firm.  Ext: Warm. No calf tenderness.    MEDICATIONS  piperacillin/tazobactam IVPB. IV Intermittent  metoprolol tartrate Oral  norepinephrine Infusion IV Continuous  dextrose 40% Gel Oral PRN  dextrose 50% Injectable IV Push  dextrose 50% Injectable IV Push  dextrose 50% Injectable IV Push  glucagon  Injectable IntraMuscular PRN  insulin glargine Injectable (LANTUS) SubCutaneous  insulin lispro (HumaLOG) corrective regimen sliding scale SubCutaneous  heparin  Injectable SubCutaneous  pantoprazole  Injectable IV Push  calcium carbonate 1250 mG  + Vitamin D (OsCal 500 + D) Oral  dextrose 5%. IV Continuous  ferrous    sulfate Oral                          10.8   13.58 )-----------( 165      ( 30 Oct 2018 05:49 )             35.8     Bands 31.0  Bands 30.0    10-30    140  |  106  |  57<H>  ----------------------------<  129<H>  4.4   |  22  |  6.00<H>    Ca    7.6<L>      30 Oct 2018 05:49  Phos  4.4     10-30  Mg     1.8     10-30    TPro  6.7  /  Alb  2.1<L>  /  TBili  4.2<H>  /  DBili  x   /  AST  295<H>  /  ALT  164<H>  /  AlkPhos  482<H>  10-30    Lactate 2.5           10-30 @ 05:47  Lactate 2.2           10-29 @ 17:13  Lactate 2.3           10-29 @ 15:30    PT/INR - ( 30 Oct 2018 05:49 )   PT: 22.7 sec;   INR: 2.05 ratio       PTT - ( 30 Oct 2018 05:49 )  PTT:34.1 sec  Urinalysis Basic - ( 29 Oct 2018 15:30 )    Color: Yellow / Appearance: Clear / S.005 / pH: x  Gluc: x / Ketone: Negative  / Bili: Small / Urobili: Negative   Blood: x / Protein: 500 mg/dL / Nitrite: Negative   Leuk Esterase: Negative / RBC: 3-5 /HPF / WBC 0-2   Sq Epi: x / Non Sq Epi: Few / Bacteria: Few      .Blood Blood-Peripheral   Growth in aerobic and anaerobic bottles: Gram Negative Rods   Growth in aerobic and anaerobic bottles: Gram Negative Rods 10-29 @ 19:05  .Blood Blood-Peripheral   Growth in aerobic and anaerobic bottles: Gram Negative Rods  "Due to technical problems, Proteus sp. will Not be reported as part of  the BCID panel until further notice"  ***Blood Panel PCR results on this specimen are available  approximately 3 hours after the Gram stain result.***  Gram stain, PCR, and/or culture results may not always  correspond due to difference in methodologies.  ************************************************************  This PCR assay was performed using [employer].  The following targets are tested for: Enterococcus,  vancomycin resistant enterococci, Listeria monocytogenes,  coagulase negative staphylococci, S. aureus,  methicillin resistant S. aureus, Streptococcus agalactiae  (Group B), S. pneumoniae, S. pyogenes (Group A),  Acinetobacter baumannii, Enterobacter cloacae, E. coli,  Klebsiella oxytoca, K. pneumoniae, Proteus sp.,  Serratia marcescens, Haemophilus influenzae,  Neisseria meningitidis, Pseudomonas aeruginosa, Candida  albicans, C. glabrata, C krusei, C parapsilosis,  C. tropicalis and the KPC resistance gene.   Growth in aerobic and anaerobic bottles: Gram Negative Rods 10-29 @ 19:03      Rapid RVP Result: NotDetec (10-29 @ 15:30)    Radiology:   < from: CT Abdomen and Pelvis No Cont (10.29.18 @ 16:12) >  Impression:  No intrathoracic source for fever.  Constellation of findings suggestive of acute calculus cholecystitis in   the appropriate setting. Biliary dilatation with a questionable common   duct stone. Correlate with MRCP.  Additional findings as discussed.    < from: MR MRCP No Cont (10.29.18 @ 18:52) >  IMPRESSION:  Acute cholecystitis with a 2.5 cm stone at the gallbladder neck.  13 mm common bile duct without evidence for choledocholithiasis.    < from: Xray Chest 1 View-PORTABLE IMMEDIATE (10.29.18 @ 15:17) >  Impression: Diffuse interstitial prominence suggesting pulmonary vascular   congestion without focal infiltrate noted.      Bedside lung ultrasound: ***  Bedside ECHO: ***    CENTRAL LINE: Y Left IJ        DATE INSERTED:  10/30          REMOVE: N  BERMUDEZ: N (Patient is ESRD on dialysis)                         A-LINE: N                           GLOBAL ISSUE/BEST PRACTICE  Analgesia: Y  Sedation: N  HOB elevation: yes  Stress ulcer prophylaxis: yes  VTE prophylaxis: yes  Glycemic control: yes  Nutrition: NPO    CODE STATUS: Full  [facility] discussion: Y Patient is a 68y old  Female who presents with a chief complaint of Sepsis (30 Oct 2018 03:52)    24 hour events:   --patient admitted to ICU  --febrile at 103 on admission, now afebrile at 99.8  --CT: distended gallbladder with calcified stones, pericholecystic edema, and gallbladder wall thickening consistent with acute cholecystitis. Common duct dilated up to 1.5 cm with questionable distal common duct stone.  --MRCP: showed acute cholecystitis with a 2.5 cm stone at the gallbladder neck and 13 mm common bile duct without evidence for choledocholithiasis.  --blood cultures (10/29): positive for gram negative rods  --received 2 liters NS in ED, repeat BP 96/40 consistent with severe sepsis and impending shock. Received another 1L of NS and 1L LR last night (10/29). Hypertensive overnight with SBP 170s. Became hypotensive this morning (10/30). Given 1L LR IV and levophed. AM ABG showed 7.24/52/133/20/98/-5.1   --lactate remains increased: 2.3 --> 2.2 --> 2.5  --leukocytosis with bandemia (30% --> 31%), lactic acidosis, transaminitis, elevated alk phos 300 with Tbili 4.2.   --WBC improving from 23.96 --> 13.58  --1 dose of vanco IV given, continued on zosyn 3.375 g IV q12h  --received dilaudid and tylenol in ED for pain    REVIEW OF SYSTEMS  Unable to obtain meaningful ROS due to mental status      T(F): 99.8 (10-30-18 @ 04:18), Max: 103.2 (10-29-18 @ 14:40)  HR: 77 (10-30-18 @ 07:00) (77 - 112)  BP: 70/42 (10-30-18 @ 07:00) (70/42 - 200/86)  RR: 12 (10-30-18 @ 07:00) (12 - 46)  SpO2: 97% (10-30-18 @ 07:00) (90% - 99%)  Wt(kg): --      CAPILLARY BLOOD GLUCOSE      POCT Blood Glucose.: 130 mg/dL (30 Oct 2018 05:51)  POCT Blood Glucose.: 304 mg/dL (29 Oct 2018 21:50)  POCT Blood Glucose.: 313 mg/dL (29 Oct 2018 19:28)    I&O's Summary    10-29 @ 07:  -  10-30 @ 07:00  --------------------------------------------------------  IN: 5280 mL / OUT: 0 mL / NET: 5280 mL      PHYSICAL EXAM  General: Awake. Altered mental status.   HEENT: NCAT.  Resp: lungs cta b/l. no w/r/r.  CVS: RRR. S1 and S2 noted. no m/r/g.  Abd: RUQ tenderness. Abd firm.  Ext: Warm. No calf tenderness.    MEDICATIONS  piperacillin/tazobactam IVPB. IV Intermittent  metoprolol tartrate Oral  norepinephrine Infusion IV Continuous  dextrose 40% Gel Oral PRN  dextrose 50% Injectable IV Push  dextrose 50% Injectable IV Push  dextrose 50% Injectable IV Push  glucagon  Injectable IntraMuscular PRN  insulin glargine Injectable (LANTUS) SubCutaneous  insulin lispro (HumaLOG) corrective regimen sliding scale SubCutaneous  heparin  Injectable SubCutaneous  pantoprazole  Injectable IV Push  calcium carbonate 1250 mG  + Vitamin D (OsCal 500 + D) Oral  dextrose 5%. IV Continuous  ferrous    sulfate Oral                          10.8   13.58 )-----------( 165      ( 30 Oct 2018 05:49 )             35.8     Bands 31.0  Bands 30.0    10-30    140  |  106  |  57<H>  ----------------------------<  129<H>  4.4   |  22  |  6.00<H>    Ca    7.6<L>      30 Oct 2018 05:49  Phos  4.4     10-30  Mg     1.8     10-30    TPro  6.7  /  Alb  2.1<L>  /  TBili  4.2<H>  /  DBili  x   /  AST  295<H>  /  ALT  164<H>  /  AlkPhos  482<H>  10-30    Lactate 2.5           10-30 @ 05:47  Lactate 2.2           10-29 @ 17:13  Lactate 2.3           10-29 @ 15:30    PT/INR - ( 30 Oct 2018 05:49 )   PT: 22.7 sec;   INR: 2.05 ratio       PTT - ( 30 Oct 2018 05:49 )  PTT:34.1 sec  Urinalysis Basic - ( 29 Oct 2018 15:30 )    Color: Yellow / Appearance: Clear / S.005 / pH: x  Gluc: x / Ketone: Negative  / Bili: Small / Urobili: Negative   Blood: x / Protein: 500 mg/dL / Nitrite: Negative   Leuk Esterase: Negative / RBC: 3-5 /HPF / WBC 0-2   Sq Epi: x / Non Sq Epi: Few / Bacteria: Few      .Blood Blood-Peripheral   Growth in aerobic and anaerobic bottles: Gram Negative Rods   Growth in aerobic and anaerobic bottles: Gram Negative Rods 10-29 @ 19:05  .Blood Blood-Peripheral   Growth in aerobic and anaerobic bottles: Gram Negative Rods  "Due to technical problems, Proteus sp. will Not be reported as part of  the BCID panel until further notice"  ***Blood Panel PCR results on this specimen are available  approximately 3 hours after the Gram stain result.***  Gram stain, PCR, and/or culture results may not always  correspond due to difference in methodologies.  ************************************************************  This PCR assay was performed using [employer].  The following targets are tested for: Enterococcus,  vancomycin resistant enterococci, Listeria monocytogenes,  coagulase negative staphylococci, S. aureus,  methicillin resistant S. aureus, Streptococcus agalactiae  (Group B), S. pneumoniae, S. pyogenes (Group A),  Acinetobacter baumannii, Enterobacter cloacae, E. coli,  Klebsiella oxytoca, K. pneumoniae, Proteus sp.,  Serratia marcescens, Haemophilus influenzae,  Neisseria meningitidis, Pseudomonas aeruginosa, Candida  albicans, C. glabrata, C krusei, C parapsilosis,  C. tropicalis and the KPC resistance gene.   Growth in aerobic and anaerobic bottles: Gram Negative Rods 10-29 @ 19:03      Rapid RVP Result: NotDetec (10-29 @ 15:30)    Radiology:   < from: CT Abdomen and Pelvis No Cont (10.29.18 @ 16:12) >  Impression:  No intrathoracic source for fever.  Constellation of findings suggestive of acute calculus cholecystitis in   the appropriate setting. Biliary dilatation with a questionable common   duct stone. Correlate with MRCP.  Additional findings as discussed.    < from: MR MRCP No Cont (10.29.18 @ 18:52) >  IMPRESSION:  Acute cholecystitis with a 2.5 cm stone at the gallbladder neck.  13 mm common bile duct without evidence for choledocholithiasis.    < from: Xray Chest 1 View-PORTABLE IMMEDIATE (10.29.18 @ 15:17) >  Impression: Diffuse interstitial prominence suggesting pulmonary vascular   congestion without focal infiltrate noted.      CENTRAL LINE: Y Left IJ        DATE INSERTED:  10/30          REMOVE: N  BERMUDEZ: N (Patient is ESRD on dialysis)                         A-LINE: N                           GLOBAL ISSUE/BEST PRACTICE  Analgesia: Y  Sedation: N  HOB elevation: yes  Stress ulcer prophylaxis: yes  VTE prophylaxis: yes  Glycemic control: yes  Nutrition: NPO    CODE STATUS: Full  GOC discussion: Y Patient is a 68y old  Female who presents with a chief complaint of Sepsis (30 Oct 2018 03:52)    24 hour events:   --patient admitted to ICU  --febrile at 103 on admission, now afebrile at 99.8  --CT: distended gallbladder with calcified stones, pericholecystic edema, and gallbladder wall thickening consistent with acute cholecystitis. Common duct dilated up to 1.5 cm with questionable distal common duct stone.  --MRCP: showed acute cholecystitis with a 2.5 cm stone at the gallbladder neck and 13 mm common bile duct without evidence for choledocholithiasis.  --blood cultures (10/29): positive for gram negative rods  --received 2 liters NS in ED, repeat BP 96/40 consistent with severe sepsis and impending shock. Received another 1L of NS and 1L LR last night (10/29). Hypertensive overnight with SBP 170s. Became hypotensive this morning (10/30). Given 1L LR IV and levophed. AM ABG showed 7.24/52/133/20/98/-5.1   --lactate remains increased: 2.3 --> 2.2 --> 2.5  --leukocytosis with bandemia (30% --> 31%), lactic acidosis, transaminitis, elevated alk phos 300 with Tbili 4.2.   --WBC improving from 23.96 --> 13.58  --1 dose of vanco IV given, continued on zosyn 3.375 g IV q12h  --received dilaudid and tylenol in ED for pain    REVIEW OF SYSTEMS  Unable to obtain meaningful ROS due to mental status      T(F): 99.8 (10-30-18 @ 04:18), Max: 103.2 (10-29-18 @ 14:40)  HR: 77 (10-30-18 @ 07:00) (77 - 112)  BP: 70/42 (10-30-18 @ 07:00) (70/42 - 200/86)  RR: 12 (10-30-18 @ 07:00) (12 - 46)  SpO2: 97% (10-30-18 @ 07:00) (90% - 99%)  Wt(kg): --      CAPILLARY BLOOD GLUCOSE      POCT Blood Glucose.: 130 mg/dL (30 Oct 2018 05:51)  POCT Blood Glucose.: 304 mg/dL (29 Oct 2018 21:50)  POCT Blood Glucose.: 313 mg/dL (29 Oct 2018 19:28)    I&O's Summary    10-29 @ 07:  -  10-30 @ 07:00  --------------------------------------------------------  IN: 5280 mL / OUT: 0 mL / NET: 5280 mL      PHYSICAL EXAM  General: Awake. Altered mental status.   HEENT: NCAT.  Resp: Mild crackles upper and lower lobes, non labored, not using accessory muscles of resp, on NC  CVS: RRR. S1 and S2 noted. no m/r/g.  Abd: RUQ tenderness. Abd firm.  Ext: Warm. No calf tenderness. Weak peripheral pulses. Right permacath, Left IJ triple lumen     MEDICATIONS  piperacillin/tazobactam IVPB. IV Intermittent  metoprolol tartrate Oral  norepinephrine Infusion IV Continuous  dextrose 40% Gel Oral PRN  dextrose 50% Injectable IV Push  dextrose 50% Injectable IV Push  dextrose 50% Injectable IV Push  glucagon  Injectable IntraMuscular PRN  insulin glargine Injectable (LANTUS) SubCutaneous  insulin lispro (HumaLOG) corrective regimen sliding scale SubCutaneous  heparin  Injectable SubCutaneous  pantoprazole  Injectable IV Push  calcium carbonate 1250 mG  + Vitamin D (OsCal 500 + D) Oral  dextrose 5%. IV Continuous  ferrous    sulfate Oral                          10.8   13.58 )-----------( 165      ( 30 Oct 2018 05:49 )             35.8     Bands 31.0  Bands 30.0    10-30    140  |  106  |  57<H>  ----------------------------<  129<H>  4.4   |  22  |  6.00<H>    Ca    7.6<L>      30 Oct 2018 05:49  Phos  4.4     10-30  Mg     1.8     10-30    TPro  6.7  /  Alb  2.1<L>  /  TBili  4.2<H>  /  DBili  x   /  AST  295<H>  /  ALT  164<H>  /  AlkPhos  482<H>  10-30    Lactate 2.5           10-30 @ 05:47  Lactate 2.2           10-29 @ 17:13  Lactate 2.3           10-29 @ 15:30    PT/INR - ( 30 Oct 2018 05:49 )   PT: 22.7 sec;   INR: 2.05 ratio       PTT - ( 30 Oct 2018 05:49 )  PTT:34.1 sec  Urinalysis Basic - ( 29 Oct 2018 15:30 )    Color: Yellow / Appearance: Clear / S.005 / pH: x  Gluc: x / Ketone: Negative  / Bili: Small / Urobili: Negative   Blood: x / Protein: 500 mg/dL / Nitrite: Negative   Leuk Esterase: Negative / RBC: 3-5 /HPF / WBC 0-2   Sq Epi: x / Non Sq Epi: Few / Bacteria: Few      .Blood Blood-Peripheral   Growth in aerobic and anaerobic bottles: Gram Negative Rods   Growth in aerobic and anaerobic bottles: Gram Negative Rods 10-29 @ 19:05  .Blood Blood-Peripheral   Growth in aerobic and anaerobic bottles: Gram Negative Rods  "Due to technical problems, Proteus sp. will Not be reported as part of  the BCID panel until further notice"  ***Blood Panel PCR results on this specimen are available  approximately 3 hours after the Gram stain result.***  Gram stain, PCR, and/or culture results may not always  correspond due to difference in methodologies.  ************************************************************  This PCR assay was performed using [employer].  The following targets are tested for: Enterococcus,  vancomycin resistant enterococci, Listeria monocytogenes,  coagulase negative staphylococci, S. aureus,  methicillin resistant S. aureus, Streptococcus agalactiae  (Group B), S. pneumoniae, S. pyogenes (Group A),  Acinetobacter baumannii, Enterobacter cloacae, E. coli,  Klebsiella oxytoca, K. pneumoniae, Proteus sp.,  Serratia marcescens, Haemophilus influenzae,  Neisseria meningitidis, Pseudomonas aeruginosa, Candida  albicans, C. glabrata, C krusei, C parapsilosis,  C. tropicalis and the KPC resistance gene.   Growth in aerobic and anaerobic bottles: Gram Negative Rods 10-29 @ 19:03      Rapid RVP Result: NotDetec (10-29 @ 15:30)    Radiology:   < from: CT Abdomen and Pelvis No Cont (10.29.18 @ 16:12) >  Impression:  No intrathoracic source for fever.  Constellation of findings suggestive of acute calculus cholecystitis in   the appropriate setting. Biliary dilatation with a questionable common   duct stone. Correlate with MRCP.  Additional findings as discussed.    < from: MR MRCP No Cont (10.29.18 @ 18:52) >  IMPRESSION:  Acute cholecystitis with a 2.5 cm stone at the gallbladder neck.  13 mm common bile duct without evidence for choledocholithiasis.    < from: Xray Chest 1 View-PORTABLE IMMEDIATE (10.29.18 @ 15:17) >  Impression: Diffuse interstitial prominence suggesting pulmonary vascular   congestion without focal infiltrate noted.      CENTRAL LINE: Y Left IJ        DATE INSERTED:  10/30          REMOVE: N  BERMUDEZ: N (Patient is ESRD on dialysis)                         A-LINE: N                           GLOBAL ISSUE/BEST PRACTICE  Analgesia: Y  Sedation: N  HOB elevation: yes  Stress ulcer prophylaxis: yes  VTE prophylaxis: yes  Glycemic control: yes  Nutrition: NPO    CODE STATUS: Full  GOC discussion: Y Patient is a 68y old  Female who presents with a chief complaint of Sepsis (30 Oct 2018 03:52)    24 hour events:   --patient admitted to ICU  --febrile at 103 on admission, now afebrile at 99.8  --CT: distended gallbladder with calcified stones, pericholecystic edema, and gallbladder wall thickening consistent with acute cholecystitis. Common duct dilated up to 1.5 cm with questionable distal common duct stone.  --MRCP: showed acute cholecystitis with a 2.5 cm stone at the gallbladder neck and 13 mm common bile duct without evidence for choledocholithiasis.  --blood cultures (10/29): positive for gram negative rods  --received 2 liters NS in ED, repeat BP 96/40 consistent with severe sepsis and impending shock. Received another 1L of NS and 1L LR last night (10/29). Hypertensive overnight with SBP 170s. Became hypotensive this morning (10/30). Given 1L LR IV and levophed. AM ABG showed 7.24/52/133/20/98/-5.1   --lactate remains increased: 2.3 --> 2.2 --> 2.5  --leukocytosis with bandemia (30% --> 31%), lactic acidosis, transaminitis, elevated alk phos 300 with Tbili 4.2.   --WBC improving from 23.96 --> 13.58  --1 dose of vanco IV given, continued on zosyn 3.375 g IV q12h  --received dilaudid and tylenol in ED for pain    REVIEW OF SYSTEMS  Unable to obtain meaningful ROS due to mental status      T(F): 99.8 (10-30-18 @ 04:18), Max: 103.2 (10-29-18 @ 14:40)  HR: 77 (10-30-18 @ 07:00) (77 - 112)  BP: 70/42 (10-30-18 @ 07:00) (70/42 - 200/86)  RR: 12 (10-30-18 @ 07:00) (12 - 46)  SpO2: 97% (10-30-18 @ 07:00) (90% - 99%)  Wt(kg): --      CAPILLARY BLOOD GLUCOSE      POCT Blood Glucose.: 130 mg/dL (30 Oct 2018 05:51)  POCT Blood Glucose.: 304 mg/dL (29 Oct 2018 21:50)  POCT Blood Glucose.: 313 mg/dL (29 Oct 2018 19:28)    I&O's Summary    10-29 @ 07:  -  10-30 @ 07:00  --------------------------------------------------------  IN: 5280 mL / OUT: 0 mL / NET: 5280 mL      PHYSICAL EXAM  General: Awake. Sleepy but arousable.   HEENT: NCAT.   Resp: Mild crackles upper and lower lobes, non labored, not using accessory muscles of resp, on NC  CVS: RRR. S1 and S2 noted. no m/r/g.  Abd: RUQ tenderness to mild and deep palpation. Abd firm. + BSx 4, no guarding,   Ext: Warm. No calf tenderness. Weak peripheral pulses. Right permacath, Left IJ triple lumen     MEDICATIONS  piperacillin/tazobactam IVPB. IV Intermittent  metoprolol tartrate Oral  norepinephrine Infusion IV Continuous  dextrose 40% Gel Oral PRN  dextrose 50% Injectable IV Push  dextrose 50% Injectable IV Push  dextrose 50% Injectable IV Push  glucagon  Injectable IntraMuscular PRN  insulin glargine Injectable (LANTUS) SubCutaneous  insulin lispro (HumaLOG) corrective regimen sliding scale SubCutaneous  heparin  Injectable SubCutaneous  pantoprazole  Injectable IV Push  calcium carbonate 1250 mG  + Vitamin D (OsCal 500 + D) Oral  dextrose 5%. IV Continuous  ferrous    sulfate Oral                          10.8   13.58 )-----------( 165      ( 30 Oct 2018 05:49 )             35.8     Bands 31.0  Bands 30.0    10    140  |  106  |  57<H>  ----------------------------<  129<H>  4.4   |  22  |  6.00<H>    Ca    7.6<L>      30 Oct 2018 05:49  Phos  4.4     10-30  Mg     1.8     10-30    TPro  6.7  /  Alb  2.1<L>  /  TBili  4.2<H>  /  DBili  x   /  AST  295<H>  /  ALT  164<H>  /  AlkPhos  482<H>  10-30    Lactate 2.5           10-30 @ 05:47  Lactate 2.2           10-29 @ 17:13  Lactate 2.3           10-29 @ 15:30    PT/INR - ( 30 Oct 2018 05:49 )   PT: 22.7 sec;   INR: 2.05 ratio       PTT - ( 30 Oct 2018 05:49 )  PTT:34.1 sec  Urinalysis Basic - ( 29 Oct 2018 15:30 )    Color: Yellow / Appearance: Clear / S.005 / pH: x  Gluc: x / Ketone: Negative  / Bili: Small / Urobili: Negative   Blood: x / Protein: 500 mg/dL / Nitrite: Negative   Leuk Esterase: Negative / RBC: 3-5 /HPF / WBC 0-2   Sq Epi: x / Non Sq Epi: Few / Bacteria: Few      .Blood Blood-Peripheral   Growth in aerobic and anaerobic bottles: Gram Negative Rods   Growth in aerobic and anaerobic bottles: Gram Negative Rods 10-29 @ 19:05  .Blood Blood-Peripheral   Growth in aerobic and anaerobic bottles: Gram Negative Rods  "Due to technical problems, Proteus sp. will Not be reported as part of  the BCID panel until further notice"  ***Blood Panel PCR results on this specimen are available  approximately 3 hours after the Gram stain result.***  Gram stain, PCR, and/or culture results may not always  correspond due to difference in methodologies.  ************************************************************  This PCR assay was performed using [employer].  The following targets are tested for: Enterococcus,  vancomycin resistant enterococci, Listeria monocytogenes,  coagulase negative staphylococci, S. aureus,  methicillin resistant S. aureus, Streptococcus agalactiae  (Group B), S. pneumoniae, S. pyogenes (Group A),  Acinetobacter baumannii, Enterobacter cloacae, E. coli,  Klebsiella oxytoca, K. pneumoniae, Proteus sp.,  Serratia marcescens, Haemophilus influenzae,  Neisseria meningitidis, Pseudomonas aeruginosa, Candida  albicans, C. glabrata, C krusei, C parapsilosis,  C. tropicalis and the KPC resistance gene.   Growth in aerobic and anaerobic bottles: Gram Negative Rods 10-29 @ 19:03      Rapid RVP Result: NotDetec (10-29 @ 15:30)    Radiology:   < from: CT Abdomen and Pelvis No Cont (10.29.18 @ 16:12) >  Impression:  No intrathoracic source for fever.  Constellation of findings suggestive of acute calculus cholecystitis in   the appropriate setting. Biliary dilatation with a questionable common   duct stone. Correlate with MRCP.  Additional findings as discussed.    < from: MR MRCP No Cont (10.29.18 @ 18:52) >  IMPRESSION:  Acute cholecystitis with a 2.5 cm stone at the gallbladder neck.  13 mm common bile duct without evidence for choledocholithiasis.    < from: Xray Chest 1 View-PORTABLE IMMEDIATE (10.29.18 @ 15:17) >  Impression: Diffuse interstitial prominence suggesting pulmonary vascular   congestion without focal infiltrate noted.      CENTRAL LINE: Y Left IJ        DATE INSERTED:  10/30          REMOVE: N  BERMUDEZ: N (Patient is ESRD on dialysis)                         A-LINE: N                           GLOBAL ISSUE/BEST PRACTICE  Analgesia: Y  Sedation: N  HOB elevation: yes  Stress ulcer prophylaxis: yes  VTE prophylaxis: yes  Glycemic control: yes  Nutrition: NPO    CODE STATUS: Full  GOC discussion: Y

## 2018-10-30 NOTE — PROGRESS NOTE ADULT - PROBLEM SELECTOR PLAN 6
Hold oral meds   -c/w lantus and low dose insulin sliding scale per ICU recs  - accuchecks  -HgA1c 9.7

## 2018-10-30 NOTE — CHART NOTE - NSCHARTNOTEFT_GEN_A_CORE
Pt is 68 yr female with PMHx CAD s/p stents, ESRD on HD, DM,HTN,HLD who was admitted 10/29 with abd pain N/V fever. Pt found to be febrile 102.9, with leukocytosis and 30% bands with transaminitis and elevated total bili, found to have Acue cholelithiasis and choledocholithiasis on MRCP, in additon found to have gram neg andrew bacterimia.  Pt hosp course complicated by episode of hypotension with SBP 70's and obtundation and hypercapnic resp failure. Pt received 2 liters NS, requiring norepi gtt, placed on bipap therapy. Improvement of mental status with pressor and bibap therapy.     Neuro:  lethargic, responsive to verbal stimuli, following simple commands. ISSA 3-4/5    Pulm:  utlizing bipap therapy 12/5 FIO 30%, RR 26, SPO2 96%, breath sounds bilat, with few bibasilar crackles to 1/4 up, SpVt 295-375 cc's    CV:  cardiac monitor NSR to ST without ectopy, s1/s2 with I/VI sys murmur appreciated      Impression/Plan:  altered mental status, hypercapnic resp failure, septic shock 2/2 to gm neg andrew bacterimia    #Neuro:  -neuro status improved with improved BP   -if altered consider CT head to eval for pathology  -neuro checks q 2 hrs and prn for changes    #Pulm;  -bipap therapy titrate to ph 7.35-7.45; PCO2 35-45; SPO2 > 92%  -HOB>/= 30 degree angle  -duoneb therapy q 6 hr prn sob /wheezes    #CV:  -norepi therapy titrate to MAP >/= 70 mmHg    #GI/:  -pt with acute kevin/choledocholetithiasis  -discuss with GI for management (ERCP vs surgery)    #I.D.:  -f/u cultures  -continue zosyn 3.375 gms q 12 hrs Pt is 68 yr female with PMHx CAD s/p stents, ESRD on HD, DM,HTN,HLD who was admitted 10/29 with abd pain N/V fever. Pt found to be febrile 102.9, with leukocytosis and 30% bands with transaminitis and elevated total bili, found to have Acue cholelithiasis and choledocholithiasis on MRCP, in additon found to have gram neg andrew bacterimia.  Pt hosp course complicated by episode of hypotension with SBP 70's and obtundation and hypercapnic resp failure. Pt received 2 liters NS, requiring norepi gtt, placed on bipap therapy. Improvement of mental status with pressor and bibap therapy.     Neuro:  lethargic, responsive to verbal stimuli, following simple commands. ISSA 3-4/5    Pulm:  utlizing bipap therapy 12/5 FIO 30%, RR 26, SPO2 96%, breath sounds bilat, with few bibasilar crackles to 1/4 up, SpVt 295-375 cc's    CV:  cardiac monitor NSR to ST without ectopy, s1/s2 with I/VI sys murmur appreciated      Impression/Plan:  altered mental status, hypercapnic resp failure, septic shock 2/2 to gm neg andrew bacterimia    #Neuro:  -neuro status improved with improved BP   -if altered consider CT head to eval for pathology  -neuro checks q 2 hrs and prn for changes    #Pulm;  -bipap therapy titrate to ph 7.35-7.45; PCO2 35-45; SPO2 > 92%  -HOB>/= 30 degree angle  -duoneb therapy q 6 hr prn sob /wheezes    #CV:  -norepi therapy titrate to MAP >/= 70 mmHg    #GI/:  -pt with acute kevin/choledocholetithiasis  -discuss with GI for management (ERCP vs surgery)    #I.D.:  -f/u cultures  -continue zosyn 3.375 gms q 12 hrs    Critical Care Time: 35 minutes  Reviewing data, imaging, discussing with multidisciplinary team, not inclusive of procedures. Discussing goals of care with patient Pt is 68 yr female with PMHx CAD s/p stents, ESRD on HD, DM,HTN,HLD who was admitted 10/29 with abd pain N/V fever. Pt found to be febrile 102.9, with leukocytosis and 30% bands with transaminitis and elevated total bili, found to have Acue cholelithiasis and choledocholithiasis on MRCP, in additon found to have gram neg andrew bacterimia.  Pt hosp course complicated by episode of hypotension with SBP 70's and obtundation and hypercapnic resp failure. Pt received 2 liters NS, requiring norepi gtt, placed on bipap therapy. Improvement of mental status with pressor and bibap therapy.     Neuro:  lethargic, responsive to verbal stimuli, following simple commands. ISSA 3-4/5    Pulm:  utlizing bipap therapy 12/5 FIO 30%, RR 26, SPO2 96%, breath sounds bilat, with few bibasilar crackles to 1/4 up, SpVt 295-375 cc's    CV:  cardiac monitor NSR to ST without ectopy, s1/s2 with I/VI sys murmur appreciated      Impression/Plan:  altered mental status, hypercapnic resp failure, septic shock 2/2 to gm neg andrew bacterimia    #Neuro:  -neuro status improved with improved BP   -if altered consider CT head to eval for pathology  -neuro checks q 2 hrs and prn for changes    #Pulm;  -bipap therapy titrate to ph 7.35-7.45; PCO2 35-45; SPO2 > 92%  -HOB>/= 30 degree angle  -duoneb therapy q 6 hr prn sob /wheezes    #CV:  -norepi therapy titrate to MAP >/= 70 mmHg    #GI/:  -pt with acute kevin/choledocholetithiasis  -discuss with GI for management (ERCP vs surgery)  -ESRD - due for H.D. today    #I.D.:  -f/u cultures  -continue zosyn 3.375 gms q 12 hrs    Critical Care Time: 35 minutes  Reviewing data, imaging, discussing with multidisciplinary team, not inclusive of procedures. Discussing goals of care with patient Pt is 68 yr female with PMHx CAD s/p stents, ESRD on HD, DM,HTN,HLD who was admitted 10/29 with abd pain N/V fever. Pt found to be febrile 102.9, with leukocytosis and 30% bands with transaminitis and elevated total bili, found to have Acue cholelithiasis and choledocholithiasis on MRCP, in additon found to have gram neg andrew bacterimia.  Pt hosp course complicated by episode of hypotension with SBP 70's and obtundation and hypercapnic resp failure. Pt received 2 liters NS, requiring norepi gtt, placed on bipap therapy. Improvement of mental status with pressor and bibap therapy.     Neuro:  lethargic, responsive to verbal stimuli, following simple commands. ISSA 3-4/5    Pulm:  utlizing bipap therapy 12/5 FIO 30%, RR 26, SPO2 96%, breath sounds bilat, with few bibasilar crackles to 1/4 up, SpVt 295-375 cc's    CV:  cardiac monitor NSR to ST without ectopy, s1/s2 with I/VI sys murmur appreciated      Impression/Plan:  altered mental status, hypercapnic resp failure, septic shock 2/2 to gm neg andrew bacterimia    #Neuro:  -neuro status improved with improved BP   -if altered consider CT head to eval for pathology  -neuro checks q 2 hrs and prn for changes    #Pulm;  -bipap therapy titrate to ph 7.35-7.45; PCO2 35-45; SPO2 > 92%  -HOB>/= 30 degree angle  -duoneb therapy q 6 hr prn sob /wheezes  -ABG prn    #CV:  -norepi therapy titrate to MAP >/= 70 mmHg  -send cmp/mg++/po--4/coags now    #GI/:  -pt with acute kevin/choledocholetithiasis  -discuss with GI for management (ERCP vs surgery)  -ESRD - due for H.D. today    #I.D.:  -f/u cultures  -continue zosyn 3.375 gms q 12 hrs    Critical Care Time: 35 minutes  Reviewing data, imaging, discussing with multidisciplinary team, not inclusive of procedures. Discussing goals of care with patient

## 2018-10-30 NOTE — PROGRESS NOTE ADULT - SUBJECTIVE AND OBJECTIVE BOX
INTERVAL HPI/OVERNIGHT EVENTS:  No new overnight event.  No N/V/D. sob in am getting access mrcp results noted    Allergies    No Known Drug Allergies  Purell (Rash)    Intolerances    General:  No wt loss, fevers, chills, night sweats, fatigue,   Eyes:  Good vision, no reported pain  ENT:  No sore throat, pain, runny nose, dysphagia  CV:  No pain, palpitations, hypo/hypertension  Resp:  No dyspnea, cough, tachypnea, wheezing  GI:  No pain, No nausea, No vomiting, No diarrhea, No constipation, No weight loss, No fever, No pruritis, No rectal bleeding, No tarry stools, No dysphagia,  :  No pain, bleeding, incontinence, nocturia  Muscle:  No pain, weakness  Neuro:  No weakness, tingling, memory problems  Psych:  No fatigue, insomnia, mood problems, depression  Endocrine:  No polyuria, polydipsia, cold/heat intolerance  Heme:  No petechiae, ecchymosis, easy bruisability  Skin:  No rash, tattoos, scars, edema      PHYSICAL EXAM:   Vital Signs:  Vital Signs Last 24 Hrs  T(C): 37.7 (30 Oct 2018 04:18), Max: 39.6 (29 Oct 2018 14:40)  T(F): 99.8 (30 Oct 2018 04:18), Max: 103.2 (29 Oct 2018 14:40)  HR: 97 (30 Oct 2018 08:21) (77 - 112)  BP: 125/58 (30 Oct 2018 08:00) (70/42 - 200/86)  BP(mean): 83 (30 Oct 2018 08:00) (50 - 130)  RR: 14 (30 Oct 2018 08:00) (12 - 46)  SpO2: 95% (30 Oct 2018 08:21) (90% - 99%)  Daily Height in cm: 162.56 (29 Oct 2018 20:59)    Daily I&O's Summary    29 Oct 2018 07:01  -  30 Oct 2018 07:00  --------------------------------------------------------  IN: 5280 mL / OUT: 0 mL / NET: 5280 mL        GENERAL:  Appears stated age, well-groomed, well-nourished, no distress  HEENT:  NC/AT,  conjunctivae clear and pink, no thyromegaly, nodules, adenopathy, no JVD, sclera -anicteric  CHEST:  Full & symmetric excursion, no increased effort, breath sounds clear  HEART:  Regular rhythm, S1, S2, no murmur/rub/S3/S4, no abdominal bruit, no edema  ABDOMEN:  Soft, non-tender, non-distended, normoactive bowel sounds,  no masses ,no hepato-splenomegaly, no signs of chronic liver disease  EXTEREMITIES:  no cyanosis,clubbing or edema  SKIN:  No rash/erythema/ecchymoses/petechiae/wounds/abscess/warm/dry  NEURO:  Alert, oriented, no asterixis, no tremor, no encephalopathy      LABS:                        10.8   13.58 )-----------( 165      ( 30 Oct 2018 05:49 )             35.8     10    140  |  106  |  57<H>  ----------------------------<  144<H>  4.4   |  23  |  5.90<H>    Ca    7.9<L>      30 Oct 2018 07:37  Phos  4.4     10-  Mg     1.9     10-30    TPro  6.1  /  Alb  1.9<L>  /  TBili  3.9<H>  /  DBili  3.40<H>  /  AST  279<H>  /  ALT  158<H>  /  AlkPhos  319<H>  10-30    PT/INR - ( 30 Oct 2018 05:49 )   PT: 22.7 sec;   INR: 2.05 ratio         PTT - ( 30 Oct 2018 05:49 )  PTT:34.1 sec  Urinalysis Basic - ( 29 Oct 2018 15:30 )    Color: Yellow / Appearance: Clear / S.005 / pH: x  Gluc: x / Ketone: Negative  / Bili: Small / Urobili: Negative   Blood: x / Protein: 500 mg/dL / Nitrite: Negative   Leuk Esterase: Negative / RBC: 3-5 /HPF / WBC 0-2   Sq Epi: x / Non Sq Epi: Few / Bacteria: Few      amylaseAmylase, Serum Total: 19 U/L (10-30 @ 05:49)     lipaseLipase, Serum: 51 U/L (10-30 @ 05:49)  Lipase, Serum: 84 U/L (10- @ 15:30)    RADIOLOGY & ADDITIONAL TESTS:

## 2018-10-30 NOTE — CONSULT NOTE ADULT - ASSESSMENT
Cardiac Clearance:   High risk patient receiving urgent Low risk procedure (percutaneous cholecystectomy)  - Risk factors: obesity, MI, ESRD on HD, DMII on insulin, Functional status walks with cane + HINSON, ASA P3 - severe systemic disease, in Septic shock    - trops positive x 1 in setting of Septic shock. Will trend curve.   - EKG shows normal sinus with RBBB, and some PACs, unchanged from previous   - Pressor dependent BP, monitor routine hemodynamics  - monitor and replete lytes, keep K>4, Mg>2  - Other cardiovascular workup will depend on clinical course.  - All other workup per primary team  - Will follow 68y old female pmhx of MI s/p stent (2007), ESRD on HD via permacath started in June 2018 (MWF; missed today's session), DMII, HTN, HLD, obesity  brought in with complaints of constant "hard pressure like" abdominal pain, nausea, vomiting, fever  for 2 days duration. Watery nonbloody nonfoul smelling diarrhea x 7 yesterday and 1 episode this am and NBNB vomiting x 7-8 yesterday and once today. For the past couple weeks pt has been having pain after meals, so has had a decreased PO intake. Did not trying anything for the pain and has never experienced this in the past. Endorses generalized weakness, fevers, chills. Denies recent abx usage, sick contacts, bloody stool, CP, SOB, dysuria. Pt has dialysis catheter changed 2 days ago back after it was accidently pulled out. Last dialysis session was Friday.     Pt had cardiac clearance in May 2018 prior to starting HD in June. Cardiology at [facility], last seen at the time of her clearance.  An echo at that time was reportedly normal (unavailable)    Upon arrival to ED patient febrile at 102.9, tachycardiac at 104 and normotensive and labs significant for WBC 23.96 30% bands elevated transaminitis, elevated tbili and elevated lactate x2, Cr 5.4.  procal elevated. UA neg Since presentation BP steadily dropping. BP dropping originally  now with SBP 98. EKG: sinus tach Now on pressors.  CT chest abd pelvis: acute cholecystitis     At baseline she walks with a cane or walker.  Longstanding sob with activity, likely multifactorial.  No sxs of angina.    -there is no convincing evidence of acute ischemia.  -troponin is mildly elevated, which could certainly reflect demand ischemia in the setting of pressor dependent hypotension/sepsis, or esrd, or both  -trend troponin  -remote mi as per family, and pci  -should be on antiplatelet therapy  -statin  -bb when able    -there is no evidence of significant arrhythmia.  -sr with rbbb on ekg  -is at risk of af, monitor carefully    -there is no evidence for meaningful  volume overload.  -hd per renal    -she is planned for emergent/urgent perc drainage  -given pressor dep hypotension and sepsis, addressing this is mandatory  -she may proceed with any required life saving procedure without addl cv testing.  Routine hemodynamic monitoring is recommended.    -DVT prophylaxis  - monitor and replete lytes, keep K>4, Mg>2  - Other cardiovascular workup will depend on clinical course.  - All other workup per primary team  - Will follow     The patient is at risk of abrupt decompensation.  35 minutes of critical care time was spent with this patient.

## 2018-10-30 NOTE — DIETITIAN INITIAL EVALUATION ADULT. - OTHER INFO
patient noted with dx sepsis with acute cholecystitis who is on BIPAP at this time.  status post MRCP .

## 2018-10-30 NOTE — DIETITIAN INITIAL EVALUATION ADULT. - PROBLEM SELECTOR PLAN 6
Hold oral meds   - start on lantus and low dose insulin sliding scale per ICU recs  - accuchecks  - f/u HgA1c

## 2018-10-30 NOTE — PROGRESS NOTE ADULT - PROBLEM SELECTOR PLAN 2
Ac cholecystitis with gall stones, cholangitis and choledocholithiasis  S/P MRCP no stone in CBD, may have passed?  Increase in AST/ALT, ? 2/2 sepsis.  S/P P/C cholecystoscopy and drainage of pus.  Follow with fluid cx result  further management per ICU

## 2018-10-30 NOTE — PROGRESS NOTE ADULT - SUBJECTIVE AND OBJECTIVE BOX
Patient is a 68y old  Female who presents with a chief complaint of Sepsis (30 Oct 2018 13:42)      INTERVAL HPI/OVERNIGHT EVENTS:     MEDICATIONS  (STANDING):  calcium carbonate 1250 mG  + Vitamin D (OsCal 500 + D) 1 Tablet(s) Oral daily  chlorhexidine 2% Cloths 1 Application(s) Topical daily  dextrose 5%. 1000 milliLiter(s) (50 mL/Hr) IV Continuous <Continuous>  dextrose 50% Injectable 12.5 Gram(s) IV Push once  dextrose 50% Injectable 25 Gram(s) IV Push once  dextrose 50% Injectable 25 Gram(s) IV Push once  ferrous    sulfate 325 milliGRAM(s) Oral three times a day  heparin  Injectable 5000 Unit(s) SubCutaneous every 8 hours  insulin glargine Injectable (LANTUS) 12 Unit(s) SubCutaneous at bedtime  insulin lispro (HumaLOG) corrective regimen sliding scale   SubCutaneous every 6 hours  metoprolol tartrate 50 milliGRAM(s) Oral two times a day  norepinephrine Infusion 0.05 MICROgram(s)/kG/Min (8.85 mL/Hr) IV Continuous <Continuous>  pantoprazole  Injectable 40 milliGRAM(s) IV Push daily  piperacillin/tazobactam IVPB. 3.375 Gram(s) IV Intermittent every 12 hours  ursodiol Capsule 300 milliGRAM(s) Oral every 12 hours    MEDICATIONS  (PRN):  dextrose 40% Gel 15 Gram(s) Oral once PRN Blood Glucose LESS THAN 70 milliGRAM(s)/deciliter  glucagon  Injectable 1 milliGRAM(s) IntraMuscular once PRN Glucose LESS THAN 70 milligrams/deciliter  HYDROmorphone  Injectable 0.5 milliGRAM(s) IV Push every 4 hours PRN Moderate Pain (4 - 6)      Allergies    No Known Drug Allergies  Purell (Rash)    Intolerances        REVIEW OF SYSTEMS:  CONSTITUTIONAL: No fever or chills  HEENT:  No headache, no sore throat  RESPIRATORY: No cough, wheezing, or shortness of breath  CARDIOVASCULAR: No chest pain, palpitations, or leg swelling  GASTROINTESTINAL: No abd pain, nausea, vomiting, or diarrhea  GENITOURINARY: No dysuria, frequency, or hematuria  NEUROLOGICAL: no focal weakness or dizziness  MUSCULOSKELETAL: no myalgias     Vital Signs Last 24 Hrs  T(C): 37.1 (30 Oct 2018 12:20), Max: 37.7 (30 Oct 2018 04:18)  T(F): 98.8 (30 Oct 2018 12:20), Max: 99.8 (30 Oct 2018 04:18)  HR: 92 (30 Oct 2018 13:30) (77 - 112)  BP: 107/58 (30 Oct 2018 13:00) (70/42 - 200/86)  BP(mean): 79 (30 Oct 2018 13:00) (50 - 130)  RR: 39 (30 Oct 2018 13:30) (12 - 46)  SpO2: 97% (30 Oct 2018 13:30) (91% - 99%)    PHYSICAL EXAM:  GENERAL: NAD  HEENT:  EOMI, moist mucous membranes  CHEST/LUNG:  CTA b/l, no rales, wheezes, or rhonchi  HEART:  RRR, S1, S2  ABDOMEN:  BS+, soft, nontender, nondistended  EXTREMITIES: no edema, cyanosis, or calf tenderness  NERVOUS SYSTEM: AA&Ox3    LABS:                        11.1   24.63 )-----------( 158      ( 30 Oct 2018 09:04 )             36.4     CBC Full  -  ( 30 Oct 2018 09:04 )  WBC Count : 24.63 K/uL  Hemoglobin : 11.1 g/dL  Hematocrit : 36.4 %  Platelet Count - Automated : 158 K/uL  Mean Cell Volume : 89.2 fl  Mean Cell Hemoglobin : 27.2 pg  Mean Cell Hemoglobin Concentration : 30.5 gm/dL  Auto Neutrophil # : 20.94 K/uL  Auto Lymphocyte # : 2.46 K/uL  Auto Monocyte # : 0.25 K/uL  Auto Eosinophil # : 0.00 K/uL  Auto Basophil # : 0.00 K/uL  Auto Neutrophil % : 48.0 %  Auto Lymphocyte % : 10.0 %  Auto Monocyte % : 1.0 %  Auto Eosinophil % : 0.0 %  Auto Basophil % : 0.0 %    30 Oct 2018 07:37    140    |  106    |  57     ----------------------------<  144    4.4     |  23     |  5.90     Ca    7.9        30 Oct 2018 07:37  Phos  4.4       30 Oct 2018 07:37  Mg     1.9       30 Oct 2018 07:37    TPro  6.1    /  Alb  1.9    /  TBili  3.9    /  DBili  3.40   /  AST  279    /  ALT  158    /  AlkPhos  319    30 Oct 2018 07:37    PT/INR - ( 30 Oct 2018 05:49 )   PT: 22.7 sec;   INR: 2.05 ratio         PTT - ( 30 Oct 2018 05:49 )  PTT:34.1 sec  Urinalysis Basic - ( 29 Oct 2018 15:30 )    Color: Yellow / Appearance: Clear / S.005 / pH: x  Gluc: x / Ketone: Negative  / Bili: Small / Urobili: Negative   Blood: x / Protein: 500 mg/dL / Nitrite: Negative   Leuk Esterase: Negative / RBC: 3-5 /HPF / WBC 0-2   Sq Epi: x / Non Sq Epi: Few / Bacteria: Few      CAPILLARY BLOOD GLUCOSE      POCT Blood Glucose.: 189 mg/dL (30 Oct 2018 12:47)  POCT Blood Glucose.: 130 mg/dL (30 Oct 2018 05:51)  POCT Blood Glucose.: 304 mg/dL (29 Oct 2018 21:50)  POCT Blood Glucose.: 313 mg/dL (29 Oct 2018 19:28)        Culture - Blood (collected 10-29-18 @ 19:05)  Source: .Blood Blood-Peripheral  Gram Stain (10-30-18 @ 03:51):    Growth in aerobic and anaerobic bottles: Gram Negative Rods  Preliminary Report (10-30-18 @ 03:51):    Growth in aerobic and anaerobic bottles: Gram Negative Rods    Culture - Blood (collected 10-29-18 @ 19:03)  Source: .Blood Blood-Peripheral  Gram Stain (10-30-18 @ 03:49):    Growth in aerobic and anaerobic bottles: Gram Negative Rods  Preliminary Report (10-30-18 @ 03:49):    Growth in aerobic and anaerobic bottles: Gram Negative Rods    "Due to technical problems, Proteus sp. will Not be reported as part of    the BCID panel until further notice"    ***Blood Panel PCR results on this specimen are available    approximately 3 hours after the Gram stain result.***    Gram stain, PCR, and/or culture results may not always    correspond due to difference in methodologies.    ************************************************************    This PCR assay was performed using [employer].    The following targets are tested for: Enterococcus,    vancomycin resistant enterococci, Listeria monocytogenes,    coagulase negative staphylococci, S. aureus,    methicillin resistant S. aureus, Streptococcus agalactiae    (Group B), S. pneumoniae, S. pyogenes (Group A),    Acinetobacter baumannii, Enterobacter cloacae, E. coli,    Klebsiella oxytoca, K. pneumoniae, Proteus sp.,    Serratia marcescens, Haemophilus influenzae,    Neisseria meningitidis, Pseudomonas aeruginosa, Candida    albicans, C. glabrata, C krusei, C parapsilosis,    C. tropicalis and the KPC resistance gene.  Organism: Blood Culture PCR (10-30-18 @ 05:12)  Organism: Blood Culture PCR (10-30-18 @ 05:12)      -  Escherichia coli: Detec      Method Type: PCR        RADIOLOGY & ADDITIONAL TESTS:    Personally reviewed.     Consultant(s) Notes Reviewed:  [x] YES  [ ] NO    Care Discussed with [x] Consultants  [x] Patient  [ ] Family  [ ]      [ ] Other; RN  DVT ppx

## 2018-10-30 NOTE — PROGRESS NOTE ADULT - SUBJECTIVE AND OBJECTIVE BOX
IRENE TAYLOR  68y  Female    Patient is a 68y old  Female who presents with a chief complaint of Sepsis (30 Oct 2018 12:38)      HPI:  Hx obtained from daughter  Patient is a 68y old female pmhx CAD s/p stent (), ESRD on HD (MWF; missed today's session), DM, HTN, HLD brought in with complaints of constant "hard pressure like" abdominal pain, nausea, vomiting, fever  for 2 days duration. Watery nonbloody nonfoul smelling diarrhea x 7 yesterday and 1 episode this am and NBNB vomiting x 7-8 yesterday and once today. For the past couple weeks pt has been having pain after meals, so has had a decreased PO intake. Did not trying anything for the pain and has never experienced this in the past. Endorses generalized weakness, fevers, chills. Denies recent abx usage, sick contacts, bloody stool, CP, SOB, dysuria. Pt has dialysis catheter changed 2 days ago back after it was accidently pulled out. Last dialysis session was Friday.     Upon arrival to ED patient febrile at 102.9, tachycardiac at 104 and normotensive and labs significant for WBC 23.96 30% bands elevated transaminitis, elevated tbili and elevated lactate x2, Cr 5.4.  procal elevated. UA neg Since presentation BP steadily dropping. BP dropping originally  now with SBP 98. EKG: sinus tach  CT chest abd pelvis: acute cholecystitis  RVP:neg  CXR: increased congestions  Patient given vancomycin and zosyn, tylenol and 2L IVF. (29 Oct 2018 18:49)      PAST MEDICAL & SURGICAL HISTORY:  ESRD (end stage renal disease) on dialysis: MWF  CAD (coronary artery disease): s/p stent in   Diabetes  Myocardial infarction  Hypertension  H/O: hysterectomy          PHYSICAL EXAM:    T(C): 37.1 (10-30-18 @ 12:20), Max: 39.6 (10-29-18 @ 14:40)  HR: 92 (10-30-18 @ 13:30) (77 - 112)  BP: 107/58 (10-30-18 @ 13:00) (70/42 - 200/86)  RR: 39 (10-30-18 @ 13:30) (12 - 46)  SpO2: 97% (10-30-18 @ 13:30) (90% - 99%)  Wt(kg): --    I&O's Detail    29 Oct 2018 07:01  -  30 Oct 2018 07:00  --------------------------------------------------------  IN:    Lactated Ringers IV Bolus: 2000 mL    Oral Fluid: 180 mL    Sodium Chloride 0.9% IV Bolus: 3000 mL    Solution: 100 mL  Total IN: 5280 mL    OUT:  Total OUT: 0 mL    Total NET: 5280 mL          Respiratory: clear anteriorly, decreased BS at bases  Cardiovascular: S1 S2  Gastrointestinal: soft NT ND +BS  Extremities: edema   Neuro: Awake and alert    MEDICATIONS  (STANDING):  calcium carbonate 1250 mG  + Vitamin D (OsCal 500 + D) 1 Tablet(s) Oral daily  chlorhexidine 2% Cloths 1 Application(s) Topical daily  dextrose 5%. 1000 milliLiter(s) (50 mL/Hr) IV Continuous <Continuous>  dextrose 50% Injectable 12.5 Gram(s) IV Push once  dextrose 50% Injectable 25 Gram(s) IV Push once  dextrose 50% Injectable 25 Gram(s) IV Push once  ferrous    sulfate 325 milliGRAM(s) Oral three times a day  heparin  Injectable 5000 Unit(s) SubCutaneous every 8 hours  insulin glargine Injectable (LANTUS) 12 Unit(s) SubCutaneous at bedtime  insulin lispro (HumaLOG) corrective regimen sliding scale   SubCutaneous every 6 hours  metoprolol tartrate 50 milliGRAM(s) Oral two times a day  norepinephrine Infusion 0.05 MICROgram(s)/kG/Min (8.85 mL/Hr) IV Continuous <Continuous>  pantoprazole  Injectable 40 milliGRAM(s) IV Push daily  piperacillin/tazobactam IVPB. 3.375 Gram(s) IV Intermittent every 12 hours  ursodiol Capsule 300 milliGRAM(s) Oral every 12 hours    MEDICATIONS  (PRN):  dextrose 40% Gel 15 Gram(s) Oral once PRN Blood Glucose LESS THAN 70 milliGRAM(s)/deciliter  glucagon  Injectable 1 milliGRAM(s) IntraMuscular once PRN Glucose LESS THAN 70 milligrams/deciliter  HYDROmorphone  Injectable 0.5 milliGRAM(s) IV Push every 4 hours PRN Moderate Pain (4 - 6)                            11.1   24.63 )-----------( 158      ( 30 Oct 2018 09:04 )             36.4       10-    140  |  106  |  57<H>  ----------------------------<  144<H>  4.4   |  23  |  5.90<H>    Ca    7.9<L>      30 Oct 2018 07:37  Phos  4.4     10-30  Mg     1.9     10-30    TPro  6.1  /  Alb  1.9<L>  /  TBili  3.9<H>  /  DBili  3.40<H>  /  AST  279<H>  /  ALT  158<H>  /  AlkPhos  319<H>  10      Urinalysis Basic - ( 29 Oct 2018 15:30 )    Color: Yellow / Appearance: Clear / S.005 / pH: x  Gluc: x / Ketone: Negative  / Bili: Small / Urobili: Negative   Blood: x / Protein: 500 mg/dL / Nitrite: Negative   Leuk Esterase: Negative / RBC: 3-5 /HPF / WBC 0-2   Sq Epi: x / Non Sq Epi: Few / Bacteria: Few

## 2018-10-30 NOTE — CONSULT NOTE ADULT - SUBJECTIVE AND OBJECTIVE BOX
[facility]         Glynn Bullock, Susan, Claudia, Flaquita, Terry Mora        [phone] (office)    CHIEF COMPLAINT: Patient is a 68y old  Female who presents with a chief complaint of Sepsis (30 Oct 2018 09:06)      HPI:  Hx obtained from family members  Patient is a 68y old female pmhx of MI s/p stent (2007), ESRD on HD via permacath started in June 2018 (MWF; missed today's session), DMII, HTN, HLD, obesity  brought in with complaints of constant "hard pressure like" abdominal pain, nausea, vomiting, fever  for 2 days duration. Watery nonbloody nonfoul smelling diarrhea x 7 yesterday and 1 episode this am and NBNB vomiting x 7-8 yesterday and once today. For the past couple weeks pt has been having pain after meals, so has had a decreased PO intake. Did not trying anything for the pain and has never experienced this in the past. Endorses generalized weakness, fevers, chills. Denies recent abx usage, sick contacts, bloody stool, CP, SOB, dysuria. Pt has dialysis catheter changed 2 days ago back after it was accidently pulled out. Last dialysis session was Friday.     Pt had cardiac clearance in May 2018 prior to starting HD in June. Cardiology at [facility].    Upon arrival to ED patient febrile at 102.9, tachycardiac at 104 and normotensive and labs significant for WBC 23.96 30% bands elevated transaminitis, elevated tbili and elevated lactate x2, Cr 5.4.  procal elevated. UA neg Since presentation BP steadily dropping. BP dropping originally  now with SBP 98. EKG: sinus tach  CT chest abd pelvis: acute cholecystitis  RVP:neg  CXR: increased congestions  Patient given vancomycin and zosyn, tylenol and 2L IVF. (29 Oct 2018 18:49)      PAST MEDICAL & SURGICAL HISTORY:  ESRD (end stage renal disease) on dialysis: MWF  CAD (coronary artery disease): s/p stent in 2007  Diabetes  Myocardial infarction  Hypertension  H/O: hysterectomy      SOCIAL HISTORY: No active tobacco, alcohol or illicit drug use    FAMILY HISTORY:  No pertinent family history in first degree relatives      Outpatient medications:    MEDICATIONS  (STANDING):  calcium carbonate 1250 mG  + Vitamin D (OsCal 500 + D) 1 Tablet(s) Oral daily  chlorhexidine 2% Cloths 1 Application(s) Topical daily  dextrose 5%. 1000 milliLiter(s) (50 mL/Hr) IV Continuous <Continuous>  dextrose 50% Injectable 12.5 Gram(s) IV Push once  dextrose 50% Injectable 25 Gram(s) IV Push once  dextrose 50% Injectable 25 Gram(s) IV Push once  ferrous    sulfate 325 milliGRAM(s) Oral three times a day  heparin  Injectable 5000 Unit(s) SubCutaneous every 8 hours  insulin glargine Injectable (LANTUS) 12 Unit(s) SubCutaneous at bedtime  insulin lispro (HumaLOG) corrective regimen sliding scale   SubCutaneous every 6 hours  metoprolol tartrate 50 milliGRAM(s) Oral two times a day  norepinephrine Infusion 0.05 MICROgram(s)/kG/Min (8.85 mL/Hr) IV Continuous <Continuous>  pantoprazole  Injectable 40 milliGRAM(s) IV Push daily  piperacillin/tazobactam IVPB. 3.375 Gram(s) IV Intermittent every 12 hours  ursodiol Capsule 300 milliGRAM(s) Oral every 12 hours    MEDICATIONS  (PRN):  dextrose 40% Gel 15 Gram(s) Oral once PRN Blood Glucose LESS THAN 70 milliGRAM(s)/deciliter  glucagon  Injectable 1 milliGRAM(s) IntraMuscular once PRN Glucose LESS THAN 70 milligrams/deciliter      Allergies    No Known Drug Allergies  Purell (Rash)    Intolerances        REVIEW OF SYSTEMS:   CONSTITUTIONAL: + fever, +chills, + fatigue, weakness  HEENT: denies blurred vision, sore throat  SKIN: denies new lesions, rash  CARDIOVASCULAR: denies chest pain, chest pressure, palpitations  RESPIRATORY: denies shortness of breath, sputum production  GASTROINTESTINAL:  SEE HPI   GENITOURINARY: denies dysuria, discharge  NEUROLOGICAL: denies numbness, headache, focal weakness  MUSCULOSKELETAL: denies new joint pain, muscle aches  HEMATOLOGIC: denies gross bleeding, bruising  LYMPHATICS: denies enlarged lymph nodes, extremity swelling  PSYCHIATRIC: denies recent changes in anxiety, depression  ENDOCRINOLOGIC: denies sweating, cold or heat intolerance  VITAL SIGNS:       Vital Signs Last 24 Hrs  T(C): 37.7 (30 Oct 2018 04:18), Max: 39.6 (29 Oct 2018 14:40)  T(F): 99.8 (30 Oct 2018 04:18), Max: 103.2 (29 Oct 2018 14:40)  HR: 100 (30 Oct 2018 11:00) (77 - 112)  BP: 137/75 (30 Oct 2018 11:00) (70/42 - 200/86)  BP(mean): 97 (30 Oct 2018 11:00) (50 - 130)  RR: 23 (30 Oct 2018 11:00) (12 - 46)  SpO2: 97% (30 Oct 2018 11:00) (90% - 99%)    I&O's Summary    29 Oct 2018 07:01  -  30 Oct 2018 07:00  --------------------------------------------------------  IN: 5280 mL / OUT: 0 mL / NET: 5280 mL        PHYSICAL EXAM:    Constitutional: NAD, awake and alert, well-developed on 3 L NC   Eyes:  EOMI, no oral cyanosis, conjunctivae clear, anicteric.  Pulmonary: Non-labored, coarse crackles right middle and lower lobe,  no wheezing, rales or rhonchi  Cardiovascular:  regular S1 and S2. No murmur.  No rubs, gallops or clicks.  weak peripheral pulses.  Gastrointestinal: diminished Bowel Sounds, distended  firm, edematous bl  Lymph: 1+ pitting edema b/l   Neurological: Alert, strength and sensitivity are grossly intact  Skin: No obvious lesions/rashes.   Psych:  Mood & affect appropriate .    LABS: All Labs Reviewed:                        11.1   24.63 )-----------( 158      ( 30 Oct 2018 09:04 )             36.4                         10.8   13.58 )-----------( 165      ( 30 Oct 2018 05:49 )             35.8                         11.7   23.96 )-----------( 241      ( 29 Oct 2018 15:30 )             37.9     30 Oct 2018 07:37    140    |  106    |  57     ----------------------------<  144    4.4     |  23     |  5.90   30 Oct 2018 05:49    140    |  106    |  57     ----------------------------<  129    4.4     |  22     |  6.00   29 Oct 2018 15:30    136    |  100    |  49     ----------------------------<  359    5.1     |  24     |  5.40     Ca    7.9        30 Oct 2018 07:37  Ca    7.6        30 Oct 2018 05:49  Ca    8.7        29 Oct 2018 15:30  Phos  4.4       30 Oct 2018 07:37  Phos  4.4       30 Oct 2018 05:49  Mg     1.9       30 Oct 2018 07:37  Mg     1.8       30 Oct 2018 05:49    TPro  6.1    /  Alb  1.9    /  TBili  3.9    /  DBili  3.40   /  AST  279    /  ALT  158    /  AlkPhos  319    30 Oct 2018 07:37  TPro  6.7    /  Alb  2.1    /  TBili  4.2    /  DBili  x      /  AST  295    /  ALT  164    /  AlkPhos  482    30 Oct 2018 05:49  TPro  7.7    /  Alb  2.6    /  TBili  4.2    /  DBili  x      /  AST  246    /  ALT  136    /  AlkPhos  300    29 Oct 2018 15:30    PT/INR - ( 30 Oct 2018 05:49 )   PT: 22.7 sec;   INR: 2.05 ratio         PTT - ( 30 Oct 2018 05:49 )  PTT:34.1 sec  CARDIAC MARKERS ( 30 Oct 2018 09:04 )  .348 ng/mL / x     / 104 U/L / x     / 1.8 ng/mL      Blood Culture: Organism --  Gram Stain Blood -- Gram Stain   Growth in aerobic and anaerobic bottles: Gram Negative Rods  Specimen Source .Blood Blood-Peripheral  Culture-Blood --    Organism Blood Culture PCR  Gram Stain Blood -- Gram Stain   Growth in aerobic and anaerobic bottles: Gram Negative Rods  Specimen Source .Blood Blood-Peripheral  Culture-Blood --            RADIOLOGY:    EKG:    Impression/Plan: [facility]         Glynn Bullock, Susan, Claudia, Flaquita, Terry Mora        [phone] (office)    CHIEF COMPLAINT: Patient is a 68y old  Female who presents with a chief complaint of Sepsis (30 Oct 2018 09:06)      HPI:  Hx obtained from family members  Patient is a 68y old female pmhx of MI s/p stent (2007), ESRD on HD via permacath started in June 2018 (MWF; missed today's session), DMII, HTN, HLD, obesity  brought in with complaints of constant "hard pressure like" abdominal pain, nausea, vomiting, fever  for 2 days duration. Watery nonbloody nonfoul smelling diarrhea x 7 yesterday and 1 episode this am and NBNB vomiting x 7-8 yesterday and once today. For the past couple weeks pt has been having pain after meals, so has had a decreased PO intake. Did not trying anything for the pain and has never experienced this in the past. Endorses generalized weakness, fevers, chills. Denies recent abx usage, sick contacts, bloody stool, CP, SOB, dysuria. Pt has dialysis catheter changed 2 days ago back after it was accidently pulled out. Last dialysis session was Friday.     Pt had cardiac clearance in May 2018 prior to starting HD in June. Cardiology at [facility], last seen at the time of her clearance.  An echo at that time was reportedly normal (unavailable)    Upon arrival to ED patient febrile at 102.9, tachycardiac at 104 and normotensive and labs significant for WBC 23.96 30% bands elevated transaminitis, elevated tbili and elevated lactate x2, Cr 5.4.  procal elevated. UA neg Since presentation BP steadily dropping. BP dropping originally  now with SBP 98. EKG: sinus tach Now on pressors.  CT chest abd pelvis: acute cholecystitis     At baseline she walks with a cane or walker.  Longstanding sob with activity, likely multifactorial.  No sxs of angina.      PAST MEDICAL & SURGICAL HISTORY:  ESRD (end stage renal disease) on dialysis: MWF  CAD (coronary artery disease): s/p stent in 2007  Diabetes  Myocardial infarction  Hypertension  H/O: hysterectomy      SOCIAL HISTORY: No active tobacco, alcohol or illicit drug use    FAMILY HISTORY:  No pertinent family history in first degree relatives         MEDICATIONS  (STANDING):  calcium carbonate 1250 mG  + Vitamin D (OsCal 500 + D) 1 Tablet(s) Oral daily  chlorhexidine 2% Cloths 1 Application(s) Topical daily  dextrose 5%. 1000 milliLiter(s) (50 mL/Hr) IV Continuous <Continuous>  dextrose 50% Injectable 12.5 Gram(s) IV Push once  dextrose 50% Injectable 25 Gram(s) IV Push once  dextrose 50% Injectable 25 Gram(s) IV Push once  ferrous    sulfate 325 milliGRAM(s) Oral three times a day  heparin  Injectable 5000 Unit(s) SubCutaneous every 8 hours  insulin glargine Injectable (LANTUS) 12 Unit(s) SubCutaneous at bedtime  insulin lispro (HumaLOG) corrective regimen sliding scale   SubCutaneous every 6 hours  metoprolol tartrate 50 milliGRAM(s) Oral two times a day  norepinephrine Infusion 0.05 MICROgram(s)/kG/Min (8.85 mL/Hr) IV Continuous <Continuous>  pantoprazole  Injectable 40 milliGRAM(s) IV Push daily  piperacillin/tazobactam IVPB. 3.375 Gram(s) IV Intermittent every 12 hours  ursodiol Capsule 300 milliGRAM(s) Oral every 12 hours    MEDICATIONS  (PRN):  dextrose 40% Gel 15 Gram(s) Oral once PRN Blood Glucose LESS THAN 70 milliGRAM(s)/deciliter  glucagon  Injectable 1 milliGRAM(s) IntraMuscular once PRN Glucose LESS THAN 70 milligrams/deciliter      Allergies    No Known Drug Allergies  Purell (Rash)    Intolerances        REVIEW OF SYSTEMS: per family  CONSTITUTIONAL: + fever, +chills, + fatigue, weakness  HEENT: denies blurred vision, sore throat  SKIN: denies new lesions, rash  CARDIOVASCULAR: denies chest pain, chest pressure, palpitations  RESPIRATORY: denies shortness of breath, sputum production  GASTROINTESTINAL:  SEE HPI   GENITOURINARY: denies dysuria, discharge  NEUROLOGICAL: denies numbness, headache, focal weakness  MUSCULOSKELETAL: denies new joint pain, muscle aches  HEMATOLOGIC: denies gross bleeding, bruising  LYMPHATICS: denies enlarged lymph nodes, extremity swelling  PSYCHIATRIC: denies recent changes in anxiety, depression  ENDOCRINOLOGIC: denies sweating, cold or heat intolerance  VITAL SIGNS:       Vital Signs Last 24 Hrs  T(C): 37.7 (30 Oct 2018 04:18), Max: 39.6 (29 Oct 2018 14:40)  T(F): 99.8 (30 Oct 2018 04:18), Max: 103.2 (29 Oct 2018 14:40)  HR: 100 (30 Oct 2018 11:00) (77 - 112)  BP: 137/75 (30 Oct 2018 11:00) (70/42 - 200/86)  BP(mean): 97 (30 Oct 2018 11:00) (50 - 130)  RR: 23 (30 Oct 2018 11:00) (12 - 46)  SpO2: 97% (30 Oct 2018 11:00) (90% - 99%)    I&O's Summary    29 Oct 2018 07:01  -  30 Oct 2018 07:00  --------------------------------------------------------  IN: 5280 mL / OUT: 0 mL / NET: 5280 mL        PHYSICAL EXAM:    Constitutional: NAD, awake and leth 3 L NC   Eyes:  EOMI, no oral cyanosis, conjunctivae clear, anicteric.  Pulmonary: Non-labored, coarse crackles right middle and lower lobe,  no wheezing, rales or rhonchi  Cardiovascular:  regular S1 and S2. No murmur.  No rubs, gallops or clicks.  weak peripheral pulses.  Gastrointestinal: diminished Bowel Sounds, distended  firm, edematous bl  Lymph: 1+ pitting edema b/l   Neurological: Alert, strength and sensitivity are grossly intact  Skin: No obvious lesions/rashes.   Psych:  Mood & affect appropriate .    LABS: All Labs Reviewed:                        11.1   24.63 )-----------( 158      ( 30 Oct 2018 09:04 )             36.4                         10.8   13.58 )-----------( 165      ( 30 Oct 2018 05:49 )             35.8                         11.7   23.96 )-----------( 241      ( 29 Oct 2018 15:30 )             37.9     30 Oct 2018 07:37    140    |  106    |  57     ----------------------------<  144    4.4     |  23     |  5.90   30 Oct 2018 05:49    140    |  106    |  57     ----------------------------<  129    4.4     |  22     |  6.00   29 Oct 2018 15:30    136    |  100    |  49     ----------------------------<  359    5.1     |  24     |  5.40     Ca    7.9        30 Oct 2018 07:37  Ca    7.6        30 Oct 2018 05:49  Ca    8.7        29 Oct 2018 15:30  Phos  4.4       30 Oct 2018 07:37  Phos  4.4       30 Oct 2018 05:49  Mg     1.9       30 Oct 2018 07:37  Mg     1.8       30 Oct 2018 05:49    TPro  6.1    /  Alb  1.9    /  TBili  3.9    /  DBili  3.40   /  AST  279    /  ALT  158    /  AlkPhos  319    30 Oct 2018 07:37  TPro  6.7    /  Alb  2.1    /  TBili  4.2    /  DBili  x      /  AST  295    /  ALT  164    /  AlkPhos  482    30 Oct 2018 05:49  TPro  7.7    /  Alb  2.6    /  TBili  4.2    /  DBili  x      /  AST  246    /  ALT  136    /  AlkPhos  300    29 Oct 2018 15:30    PT/INR - ( 30 Oct 2018 05:49 )   PT: 22.7 sec;   INR: 2.05 ratio         PTT - ( 30 Oct 2018 05:49 )  PTT:34.1 sec  CARDIAC MARKERS ( 30 Oct 2018 09:04 )  .348 ng/mL / x     / 104 U/L / x     / 1.8 ng/mL      Blood Culture: Organism --  Gram Stain Blood -- Gram Stain   Growth in aerobic and anaerobic bottles: Gram Negative Rods  Specimen Source .Blood Blood-Peripheral  Culture-Blood --    Organism Blood Culture PCR  Gram Stain Blood -- Gram Stain   Growth in aerobic and anaerobic bottles: Gram Negative Rods  Specimen Source .Blood Blood-Peripheral  Culture-Blood --            RADIOLOGY:  < from: CT Abdomen and Pelvis No Cont (10.29.18 @ 16:12) >    EXAM:  CT ABDOMEN AND PELVIS                            PROCEDURE DATE:  10/29/2018          INTERPRETATION:  History: Fever weakness hypoxia.    CT chest abdomen and pelvis, no contrast.    Limited by lack of any oral or intravenous contrast.  Streaky atelectatic changes left base. No lobar consolidation or pleural   effusion.  Central airways patent. No mediastinal adenopathy. Normal caliber   thoracic aorta.  Mild cardiomegaly with coronary artery calcification. Gallbladder   markedly distended with calcified stones. Pericholecystic edema noted.   Findings are suggestive of acute cholecystitis in appropriate clinical   setting. Common duct dilated up to 1.5 cm, there is questionable distal   common duct stone. Correlate with MRCP. Unenhanced liver pancreas spleen   not remarkable.  Left renal cyst.  Abdominal aorta nonaneurysmal.  No suspicious adenopathy.  No evidence appendicitis obstructed or actively inflamed bowel. No   extraluminal fluid or gas.  Status post hysterectomy. Bladder not adequately distended.  Nonspecific subcutaneous induration lower anterior abdominal wall may   reflect scarring or edema/cellulitis.Correlate clinically.  Age-indeterminate T12 and L2 compression deformities.    Impression:    No intrathoracic source for fever.  Constellation of findings suggestive of acute calculus cholecystitis in   the appropriate setting. Biliary dilatation with a questionable common   duct stone. Correlate with MRCP.  Additional findings as discussed.                    QUIN POTTS M.D., ATTENDING RADIOLOGIST  This document has been electronically signed. Oct 29 2018  4:29PM                < end of copied text >    EKG: sr rad rbbb

## 2018-10-30 NOTE — PROGRESS NOTE ADULT - PROBLEM SELECTOR PLAN 1
2/2 acute calculus cholecystitis with biliary dilatation with common bile duct stone.   Pt was hypotensive and on levophed.  She has increase in WBCs and bands.   Blood Cx + for GNR. c/w zosyn.  ID consult f/u

## 2018-10-30 NOTE — PROGRESS NOTE ADULT - SUBJECTIVE AND OBJECTIVE BOX
pt seen  feeling better as per daughter  ICU Vital Signs Last 24 Hrs  T(C): 37.1 (30 Oct 2018 12:20), Max: 39.6 (29 Oct 2018 14:40)  T(F): 98.8 (30 Oct 2018 12:20), Max: 103.2 (29 Oct 2018 14:40)  HR: 100 (30 Oct 2018 11:00) (77 - 112)  BP: 137/75 (30 Oct 2018 11:00) (70/42 - 200/86)  BP(mean): 97 (30 Oct 2018 11:00) (50 - 130)  ABP: --  ABP(mean): --  RR: 23 (30 Oct 2018 11:00) (12 - 46)  SpO2: 97% (30 Oct 2018 11:00) (90% - 99%)  gen-NAD  resp-kaitlynn  abd-soft, RUQ Tenderness                          11.1   24.63 )-----------( 158      ( 30 Oct 2018 09:04 )             36.4   10-30    140  |  106  |  57<H>  ----------------------------<  144<H>  4.4   |  23  |  5.90<H>    Ca    7.9<L>      30 Oct 2018 07:37  Phos  4.4     10-30  Mg     1.9     10-30    TPro  6.1  /  Alb  1.9<L>  /  TBili  3.9<H>  /  DBili  3.40<H>  /  AST  279<H>  /  ALT  158<H>  /  AlkPhos  319<H>  10-30    < from: MR MRCP No Cont (10.29.18 @ 18:52) >    Acute cholecystitis with a 2.5 cm stone at the gallbladder neck.    13 mm common bile duct without evidence for choledocholithiasis.      < end of copied text >

## 2018-10-30 NOTE — BRIEF OPERATIVE NOTE - PROCEDURE
<<-----Click on this checkbox to enter Procedure Cholecystostomy, percutaneous, with imaging guidance  10/30/2018    Active  [place]

## 2018-10-30 NOTE — PROGRESS NOTE ADULT - ASSESSMENT
68y old female pmhx CAD s/p stent (2007), ESRD on HD (MWF; missed monday's session), DM2, HTN, HLD, anemia of chronic kidney disease admitted with severe sepsis secondary to acute cholecystitis.     Plan:    Neuro:  --not in significant pain requiring medications at this time    Cardio:  --initially hypertensive with SBPs 170s, became hypotensive this am  --levophed 8mg at 8.85/hr  --hemodynamic monitoring  --hold ASA, plavix, imdur, nifedipine and crestor  --continue metoprolol 50mg PO BID      Resp:    GI:   --CT: distended gallbladder with calcified stones, pericholecystic edema, and gallbladder wall thickening consistent with acute cholecystitis. Common duct dilated up to 1.5 cm with questionable distal common duct stone.  --MRCP: acute cholecystitis with a 2.5 cm stone at the gallbladder neck and 13 mm common bile duct without evidence for choledocholithiasis.  --FU GI, possible ERCP  --FU surgery, lap kevin vs. percutaneous cholecystostomy. May need to contact IR, pt very high surgical risk.  --NPO  --WBC improving 23.96 --> 13.58, continue to trend  --bandemia: 30% --> 31%  --alk phos increased from 300 --> 482. continue to trend  --LFTs increased.  --> 295,  --> 164. continue to trend  --Tbili increased at 4.2. continue to trend  --amylase and lipase decreased at 19 and 51, respectively  --lactic acidosis  --ferrous sulfate 325mg PO TID  --calcium carbonate + vit D 1 tablet PO daily    :  -ESRD on HS (MWF)  -HS this am, missed Monday's session    Endo:  --DM2  --humalog sliding scale subQ q6h  --lantus 12 units subQ at bedtime  --hypoglycemic protocol  -- on admission, improving at 130. continue to monitor blood sugar levels    ID:  --sepsis  --leukocytosis with bandemia, lactic acidosis, transaminitis, elevated alk phos 300 with Tbili 4.2  --febrile upon admission at 103  --continue zosyn 3.375g q12h for acute kevin/ascending cholangitis  --lactate: 2.3 --> 2.2 --> 2.5  --blood culture (10/29): gram negative rods  --FU cultures    PPx:  --heparin 5000units subQ q8h for DVT ppx  --Protonix 40mg IV qd    Tubes & lines:  --none 68y old female pmhx CAD s/p stent (2007), ESRD on HD (MWF; missed monday's session), DM2, HTN, HLD, anemia of chronic kidney disease admitted with severe sepsis secondary to acute cholecystitis.     Plan:    Neuro:  --not in significant pain requiring medications at this time    Cardio:  --initially hypertensive with SBPs 170s, became hypotensive and was given IVF bolus  -- Left IJ placed this morning  -- On Levophed, titrate as tolerated  --hemodynamic monitoring  --hold ASA, plavix, imdur, nifedipine and crestor  --continue metoprolol 50mg PO BID  -- Reviewed EKG and ordered CE prior to emergent procedure today      Resp:    GI:   --CT: distended gallbladder with calcified stones, pericholecystic edema, and gallbladder wall thickening consistent with acute cholecystitis. Common duct dilated up to 1.5 cm with questionable distal common duct stone.  --MRCP: acute cholecystitis with a 2.5 cm stone at the gallbladder neck and 13 mm common bile duct without evidence for choledocholithiasis.  --FU GI, possible ERCP  --FU surgery, lap kevin vs. percutaneous cholecystostomy. May need to contact IR, pt very high surgical risk.  --NPO  --WBC improving 23.96 --> 13.58, continue to trend  --bandemia: 30% --> 31%  --alk phos increased from 300 --> 482. continue to trend  --LFTs increased.  --> 295,  --> 164. continue to trend  --Tbili increased at 4.2. continue to trend  --amylase and lipase decreased at 19 and 51, respectively  --lactic acidosis  --ferrous sulfate 325mg PO TID  --calcium carbonate + vit D 1 tablet PO daily    :  -ESRD on HD (MWF)  -HD this am, missed Monday's session  - Nephrology Dr. Albarran consulted    Endo:  --DM2 on insulin  --humalog sliding scale subQ q6h  --lantus 12 units subQ at bedtime  --hypoglycemic protocol  -- on admission, improving at 130. Continue to monitor blood sugar levels    ID:  --sepsis likely 2/2 cholecystitis with acute choledocholithiasis     --leukocytosis with bandemia, lactic acidosis, transaminitis, elevated alk phos 300 with Tbili 4.2  --febrile upon admission at 103F, has been afebrile this morning   --continue zosyn 3.375g q12h for acute kevin/ascending cholangitis  --lactate: 2.3 --> 2.2 --> 2.5. Repeat ordered   --blood culture (10/29): gram negative rods  -- Repeat cultures ordered     PPx:  --heparin 5000units subQ q8h for DVT ppx  --Protonix 40mg IV qd    Tubes & lines:  --none 68y old female pmhx CAD s/p stent (2007), ESRD on HD (MWF; missed monday's session), DM2, HTN, HLD, anemia of chronic kidney disease admitted with severe sepsis secondary to acute cholecystitis.     Plan:    Neuro:  --dilaudid 0.5mg IV q4h PRN for pain    Cardio:  --initially hypertensive with SBPs 170s, became hypotensive and was given IVF bolus  --On Levophed, titrate as tolerated  --hemodynamic monitoring  --Left IJ placed this morning (10/30)  --hold ASA, plavix, imdur, nifedipine and crestor  --continue metoprolol 50mg PO BID  --Trops elevated at 0.348  --ordered repeat CE prior to emergent procedure today  --Reviewed EKG (10/30): SR with RBBB    Resp:  --supplemental O2  --Bipap    GI:   --CT: distended gallbladder with calcified stones, pericholecystic edema, and gallbladder wall thickening consistent with acute cholecystitis. Common duct dilated up to 1.5 cm with questionable distal common duct stone.  --MRCP: acute cholecystitis with a 2.5 cm stone at the gallbladder neck and 13 mm common bile duct without evidence for choledocholithiasis.  --Perc drainage on 10/30 for acute cholecystitis  --FU GI, may need ERCP  --FU surgery  --NPO  --WBC improving 23.96 --> 13.58, continue to trend  --bandemia: 30% --> 31%  --alk phos increased from 300 --> 482. continue to trend  --LFTs increased.  --> 295,  --> 164. continue to trend  --Tbili increased at 4.2. continue to trend  --amylase and lipase decreased at 19 and 51, respectively  --lactic acidosis  --ferrous sulfate 325mg PO TID  --calcium carbonate + vit D 1 tablet PO daily  --ursodiol 300mg PO BID    :  --ESRD on HD (MWF)  --HD this am, missed Monday's session  --Nephrology Dr. Albarran consulted    Endo:  --DM2 on insulin  --humalog sliding scale subQ q6h  --lantus 12 units subQ at bedtime  --hypoglycemic protocol  -- on admission, improving at 130. Continue to monitor blood sugar levels    ID:  --sepsis likely 2/2 cholecystitis with acute choledocholithiasis     --leukocytosis with bandemia, lactic acidosis, transaminitis, elevated alk phos 300 with Tbili 4.2  --febrile upon admission at 103F, has been afebrile this morning   --continue zosyn 3.375g q12h for acute kevin/ascending cholangitis  --lactate: 2.3 --> 2.2 --> 2.5. Repeat ordered   --blood culture (10/29): gram negative rods  --Repeat cultures ordered     PPx:  --heparin 5000units subQ q8h for DVT ppx  --Protonix 40mg IV qd    Tubes & lines:  --Left IJ placed today  --R subclavian permacath 68y old female pmhx CAD s/p stent (2007), ESRD on HD (MWF; missed monday's session), DM2, HTN, HLD, anemia of chronic kidney disease admitted with severe sepsis secondary to acute cholecystitis.     Plan:    Neuro:  --dilaudid 0.5mg IV q4h PRN for pain    Cardio:  --initially hypertensive with SBPs 170s, became hypotensive and was given IVF bolus  --On Levophed, titrate as tolerated  --hemodynamic monitoring  --Left IJ placed this morning (10/30)  --hold ASA, plavix, imdur, nifedipine and crestor  --continue metoprolol 50mg PO BID  --Trops elevated at 0.348, repeat elevated, will obtain 3rd set   --Cardio consulted, input appreciated   --Reviewed EKG (10/30): SR with RBBB    Resp:  --supplemental O2  --Bipap    GI:   --CT: distended gallbladder with calcified stones, pericholecystic edema, and gallbladder wall thickening consistent with acute cholecystitis. Common duct dilated up to 1.5 cm with questionable distal common duct stone.  --MRCP: acute cholecystitis with a 2.5 cm stone at the gallbladder neck and 13 mm common bile duct without evidence for choledocholithiasis.  --Perc drainage on 10/30 for acute cholecystitis  --FU GI, may need ERCP  --FU surgery  --NPO  --WBC improving 23.96 --> 13.58, continue to trend  --bandemia: 30% --> 31%  --alk phos increased from 300 --> 482. continue to trend  --LFTs increased.  --> 295,  --> 164. continue to trend  --Tbili increased at 4.2. continue to trend  --amylase and lipase decreased at 19 and 51, respectively  --lactic acidosis  --ferrous sulfate 325mg PO TID  --calcium carbonate + vit D 1 tablet PO daily  --ursodiol 300mg PO BID    :  --ESRD on HD (MWF)  --HD this am, missed Monday's session  --Nephrology Dr. Albarran consulted    Endo:  --DM2 on insulin  --humalog sliding scale subQ q6h  --lantus 12 units subQ at bedtime  --hypoglycemic protocol  -- on admission, improving at 130. Continue to monitor blood sugar levels    ID:  --sepsis likely 2/2 cholecystitis with acute choledocholithiasis     --leukocytosis with bandemia, lactic acidosis, transaminitis, elevated alk phos 300 with Tbili 4.2  --febrile upon admission at 103F, has been afebrile this morning   --continue zosyn 3.375g q12h for acute kevin/ascending cholangitis  --lactate: 2.3 --> 2.2 --> 2.5. Repeat ordered   --blood culture (10/29): gram negative rods  --Repeat cultures ordered     PPx:  --heparin 5000units subQ q8h for DVT ppx  --Protonix 40mg IV qd    Tubes & lines:  --Left IJ placed today  --R subclavian permacath

## 2018-10-30 NOTE — DIETITIAN INITIAL EVALUATION ADULT. - NS AS NUTRI INTERV MEALS SNACK
[place] - modified diet/advance to clear liquids as appropriate noted patient currently on BIPAP/Carbohydrate - modified diet/Protein - modified diet/Fat - modified diet

## 2018-10-30 NOTE — PROGRESS NOTE ADULT - ASSESSMENT
69 yo with cholangitis, and cholecystitis. No stone on MRCP., poss passed?, poss mirizzi syndrome       FFP       PERc cholecystomy tube       cont abx

## 2018-10-31 DIAGNOSIS — A41.51 SEPSIS DUE TO ESCHERICHIA COLI [E. COLI]: ICD-10-CM

## 2018-10-31 DIAGNOSIS — K83.09 OTHER CHOLANGITIS: ICD-10-CM

## 2018-10-31 LAB
ALBUMIN SERPL ELPH-MCNC: 2.1 G/DL — LOW (ref 3.3–5)
ALP SERPL-CCNC: 262 U/L — HIGH (ref 40–120)
ALT FLD-CCNC: 208 U/L — HIGH (ref 12–78)
ANION GAP SERPL CALC-SCNC: 8 MMOL/L — SIGNIFICANT CHANGE UP (ref 5–17)
AST SERPL-CCNC: 272 U/L — HIGH (ref 15–37)
BILIRUB SERPL-MCNC: 4.3 MG/DL — HIGH (ref 0.2–1.2)
BUN SERPL-MCNC: 32 MG/DL — HIGH (ref 7–23)
CALCIUM SERPL-MCNC: 8.2 MG/DL — LOW (ref 8.5–10.1)
CHLORIDE SERPL-SCNC: 101 MMOL/L — SIGNIFICANT CHANGE UP (ref 96–108)
CO2 SERPL-SCNC: 28 MMOL/L — SIGNIFICANT CHANGE UP (ref 22–31)
CREAT SERPL-MCNC: 4.1 MG/DL — HIGH (ref 0.5–1.3)
GLUCOSE SERPL-MCNC: 130 MG/DL — HIGH (ref 70–99)
GRAM STN FLD: SIGNIFICANT CHANGE UP
HCT VFR BLD CALC: 34.3 % — LOW (ref 34.5–45)
HGB BLD-MCNC: 10.3 G/DL — LOW (ref 11.5–15.5)
INR BLD: 1.62 RATIO — HIGH (ref 0.88–1.16)
MAGNESIUM SERPL-MCNC: 2 MG/DL — SIGNIFICANT CHANGE UP (ref 1.6–2.6)
MCHC RBC-ENTMCNC: 27.3 PG — SIGNIFICANT CHANGE UP (ref 27–34)
MCHC RBC-ENTMCNC: 30 GM/DL — LOW (ref 32–36)
MCV RBC AUTO: 91 FL — SIGNIFICANT CHANGE UP (ref 80–100)
NRBC # BLD: 0 /100 WBCS — SIGNIFICANT CHANGE UP (ref 0–0)
PHOSPHATE SERPL-MCNC: 4 MG/DL — SIGNIFICANT CHANGE UP (ref 2.5–4.5)
PLATELET # BLD AUTO: 122 K/UL — LOW (ref 150–400)
POTASSIUM SERPL-MCNC: 4.3 MMOL/L — SIGNIFICANT CHANGE UP (ref 3.5–5.3)
POTASSIUM SERPL-SCNC: 4.3 MMOL/L — SIGNIFICANT CHANGE UP (ref 3.5–5.3)
PROT SERPL-MCNC: 6.8 G/DL — SIGNIFICANT CHANGE UP (ref 6–8.3)
PROTHROM AB SERPL-ACNC: 18.7 SEC — HIGH (ref 10–12.9)
RBC # BLD: 3.77 M/UL — LOW (ref 3.8–5.2)
RBC # FLD: 17.2 % — HIGH (ref 10.3–14.5)
SODIUM SERPL-SCNC: 137 MMOL/L — SIGNIFICANT CHANGE UP (ref 135–145)
SPECIMEN SOURCE: SIGNIFICANT CHANGE UP
SPECIMEN SOURCE: SIGNIFICANT CHANGE UP
WBC # BLD: 15.4 K/UL — HIGH (ref 3.8–10.5)
WBC # FLD AUTO: 15.4 K/UL — HIGH (ref 3.8–10.5)

## 2018-10-31 PROCEDURE — 71045 X-RAY EXAM CHEST 1 VIEW: CPT | Mod: 26

## 2018-10-31 PROCEDURE — 99291 CRITICAL CARE FIRST HOUR: CPT

## 2018-10-31 PROCEDURE — 99233 SBSQ HOSP IP/OBS HIGH 50: CPT

## 2018-10-31 PROCEDURE — 93010 ELECTROCARDIOGRAM REPORT: CPT

## 2018-10-31 RX ORDER — METOCLOPRAMIDE HCL 10 MG
10 TABLET ORAL EVERY 8 HOURS
Qty: 0 | Refills: 0 | Status: DISCONTINUED | OUTPATIENT
Start: 2018-10-31 | End: 2018-11-02

## 2018-10-31 RX ORDER — ACETAMINOPHEN 500 MG
650 TABLET ORAL EVERY 6 HOURS
Qty: 0 | Refills: 0 | Status: DISCONTINUED | OUTPATIENT
Start: 2018-10-31 | End: 2018-11-16

## 2018-10-31 RX ORDER — HYDROMORPHONE HYDROCHLORIDE 2 MG/ML
0.25 INJECTION INTRAMUSCULAR; INTRAVENOUS; SUBCUTANEOUS EVERY 4 HOURS
Qty: 0 | Refills: 0 | Status: DISCONTINUED | OUTPATIENT
Start: 2018-10-31 | End: 2018-10-31

## 2018-10-31 RX ORDER — ONDANSETRON 8 MG/1
4 TABLET, FILM COATED ORAL ONCE
Qty: 0 | Refills: 0 | Status: COMPLETED | OUTPATIENT
Start: 2018-10-31 | End: 2018-10-31

## 2018-10-31 RX ORDER — SIMETHICONE 80 MG/1
80 TABLET, CHEWABLE ORAL EVERY 6 HOURS
Qty: 0 | Refills: 0 | Status: DISCONTINUED | OUTPATIENT
Start: 2018-10-31 | End: 2018-11-16

## 2018-10-31 RX ORDER — CHLORHEXIDINE GLUCONATE 213 G/1000ML
1 SOLUTION TOPICAL DAILY
Qty: 0 | Refills: 0 | Status: DISCONTINUED | OUTPATIENT
Start: 2018-10-31 | End: 2018-11-01

## 2018-10-31 RX ORDER — LIDOCAINE 4 G/100G
1 CREAM TOPICAL ONCE
Qty: 0 | Refills: 0 | Status: COMPLETED | OUTPATIENT
Start: 2018-10-31 | End: 2018-10-31

## 2018-10-31 RX ORDER — ACETAMINOPHEN 500 MG
1000 TABLET ORAL ONCE
Qty: 0 | Refills: 0 | Status: COMPLETED | OUTPATIENT
Start: 2018-10-31 | End: 2018-10-31

## 2018-10-31 RX ORDER — FENTANYL CITRATE 50 UG/ML
25 INJECTION INTRAVENOUS ONCE
Qty: 0 | Refills: 0 | Status: DISCONTINUED | OUTPATIENT
Start: 2018-10-31 | End: 2018-10-31

## 2018-10-31 RX ORDER — ASPIRIN/CALCIUM CARB/MAGNESIUM 324 MG
81 TABLET ORAL DAILY
Qty: 0 | Refills: 0 | Status: DISCONTINUED | OUTPATIENT
Start: 2018-10-31 | End: 2018-11-16

## 2018-10-31 RX ORDER — METOPROLOL TARTRATE 50 MG
50 TABLET ORAL
Qty: 0 | Refills: 0 | Status: DISCONTINUED | OUTPATIENT
Start: 2018-10-31 | End: 2018-11-16

## 2018-10-31 RX ORDER — ONDANSETRON 8 MG/1
4 TABLET, FILM COATED ORAL ONCE
Qty: 0 | Refills: 0 | Status: DISCONTINUED | OUTPATIENT
Start: 2018-10-31 | End: 2018-10-31

## 2018-10-31 RX ORDER — HYDROMORPHONE HYDROCHLORIDE 2 MG/ML
0.5 INJECTION INTRAMUSCULAR; INTRAVENOUS; SUBCUTANEOUS EVERY 4 HOURS
Qty: 0 | Refills: 0 | Status: DISCONTINUED | OUTPATIENT
Start: 2018-10-31 | End: 2018-11-06

## 2018-10-31 RX ORDER — HYDROMORPHONE HYDROCHLORIDE 2 MG/ML
0.25 INJECTION INTRAMUSCULAR; INTRAVENOUS; SUBCUTANEOUS EVERY 4 HOURS
Qty: 0 | Refills: 0 | Status: DISCONTINUED | OUTPATIENT
Start: 2018-10-31 | End: 2018-11-01

## 2018-10-31 RX ADMIN — Medication 10 MILLIGRAM(S): at 21:53

## 2018-10-31 RX ADMIN — HEPARIN SODIUM 5000 UNIT(S): 5000 INJECTION INTRAVENOUS; SUBCUTANEOUS at 06:20

## 2018-10-31 RX ADMIN — Medication 10 MILLIGRAM(S): at 11:54

## 2018-10-31 RX ADMIN — PANTOPRAZOLE SODIUM 40 MILLIGRAM(S): 20 TABLET, DELAYED RELEASE ORAL at 11:53

## 2018-10-31 RX ADMIN — LIDOCAINE 1 APPLICATION(S): 4 CREAM TOPICAL at 21:53

## 2018-10-31 RX ADMIN — Medication 81 MILLIGRAM(S): at 13:54

## 2018-10-31 RX ADMIN — Medication 400 MILLIGRAM(S): at 11:54

## 2018-10-31 RX ADMIN — HYDROMORPHONE HYDROCHLORIDE 0.5 MILLIGRAM(S): 2 INJECTION INTRAMUSCULAR; INTRAVENOUS; SUBCUTANEOUS at 13:00

## 2018-10-31 RX ADMIN — ONDANSETRON 4 MILLIGRAM(S): 8 TABLET, FILM COATED ORAL at 09:01

## 2018-10-31 RX ADMIN — Medication 2: at 11:53

## 2018-10-31 RX ADMIN — HEPARIN SODIUM 5000 UNIT(S): 5000 INJECTION INTRAVENOUS; SUBCUTANEOUS at 21:53

## 2018-10-31 RX ADMIN — HYDROMORPHONE HYDROCHLORIDE 0.5 MILLIGRAM(S): 2 INJECTION INTRAMUSCULAR; INTRAVENOUS; SUBCUTANEOUS at 22:54

## 2018-10-31 RX ADMIN — Medication 50 MILLIGRAM(S): at 13:54

## 2018-10-31 RX ADMIN — Medication 325 MILLIGRAM(S): at 06:20

## 2018-10-31 RX ADMIN — Medication 5 MILLIGRAM(S): at 06:20

## 2018-10-31 RX ADMIN — HYDROMORPHONE HYDROCHLORIDE 0.5 MILLIGRAM(S): 2 INJECTION INTRAMUSCULAR; INTRAVENOUS; SUBCUTANEOUS at 12:40

## 2018-10-31 RX ADMIN — CHLORHEXIDINE GLUCONATE 1 APPLICATION(S): 213 SOLUTION TOPICAL at 11:54

## 2018-10-31 RX ADMIN — ONDANSETRON 4 MILLIGRAM(S): 8 TABLET, FILM COATED ORAL at 06:45

## 2018-10-31 RX ADMIN — HYDROMORPHONE HYDROCHLORIDE 0.25 MILLIGRAM(S): 2 INJECTION INTRAMUSCULAR; INTRAVENOUS; SUBCUTANEOUS at 09:01

## 2018-10-31 RX ADMIN — FENTANYL CITRATE 25 MICROGRAM(S): 50 INJECTION INTRAVENOUS at 21:28

## 2018-10-31 RX ADMIN — PIPERACILLIN AND TAZOBACTAM 25 GRAM(S): 4; .5 INJECTION, POWDER, LYOPHILIZED, FOR SOLUTION INTRAVENOUS at 21:53

## 2018-10-31 RX ADMIN — PIPERACILLIN AND TAZOBACTAM 25 GRAM(S): 4; .5 INJECTION, POWDER, LYOPHILIZED, FOR SOLUTION INTRAVENOUS at 10:20

## 2018-10-31 RX ADMIN — Medication 50 MILLIGRAM(S): at 18:00

## 2018-10-31 RX ADMIN — Medication 1 TABLET(S): at 13:54

## 2018-10-31 RX ADMIN — SIMETHICONE 80 MILLIGRAM(S): 80 TABLET, CHEWABLE ORAL at 18:00

## 2018-10-31 RX ADMIN — HYDROMORPHONE HYDROCHLORIDE 0.5 MILLIGRAM(S): 2 INJECTION INTRAMUSCULAR; INTRAVENOUS; SUBCUTANEOUS at 01:46

## 2018-10-31 RX ADMIN — URSODIOL 300 MILLIGRAM(S): 250 TABLET, FILM COATED ORAL at 18:00

## 2018-10-31 RX ADMIN — HYDROMORPHONE HYDROCHLORIDE 0.5 MILLIGRAM(S): 2 INJECTION INTRAMUSCULAR; INTRAVENOUS; SUBCUTANEOUS at 23:24

## 2018-10-31 RX ADMIN — HEPARIN SODIUM 5000 UNIT(S): 5000 INJECTION INTRAVENOUS; SUBCUTANEOUS at 13:54

## 2018-10-31 RX ADMIN — FENTANYL CITRATE 25 MICROGRAM(S): 50 INJECTION INTRAVENOUS at 22:00

## 2018-10-31 RX ADMIN — Medication 325 MILLIGRAM(S): at 13:54

## 2018-10-31 RX ADMIN — Medication 5 MILLIGRAM(S): at 00:10

## 2018-10-31 RX ADMIN — URSODIOL 300 MILLIGRAM(S): 250 TABLET, FILM COATED ORAL at 06:20

## 2018-10-31 RX ADMIN — Medication 1000 MILLIGRAM(S): at 12:58

## 2018-10-31 RX ADMIN — HYDROMORPHONE HYDROCHLORIDE 0.25 MILLIGRAM(S): 2 INJECTION INTRAMUSCULAR; INTRAVENOUS; SUBCUTANEOUS at 08:40

## 2018-10-31 RX ADMIN — HYDROMORPHONE HYDROCHLORIDE 0.5 MILLIGRAM(S): 2 INJECTION INTRAMUSCULAR; INTRAVENOUS; SUBCUTANEOUS at 02:16

## 2018-10-31 NOTE — PROGRESS NOTE ADULT - ASSESSMENT
68y old female pmhx CAD s/p stent (2007), ESRD on HD (MWF; missed monday's session), DM2, HTN, HLD, anemia of chronic kidney disease admitted with severe sepsis secondary to acute cholecystitis.     Plan:    Neuro:  --dilaudid 0.5mg IV q4h PRN for pain    Cardio:  --initially hypertensive with SBPs 170s, became hypotensive and was given IVF bolus  --On Levophed, titrate as tolerated  --hemodynamic monitoring  --Left IJ placed this morning (10/30)  --hold ASA, plavix, imdur, nifedipine and crestor  --continue metoprolol 50mg PO BID  --Trops elevated at 0.348, repeat elevated, will obtain 3rd set   --Cardio consulted, input appreciated   --Reviewed EKG (10/30): SR with RBBB    Resp:  --supplemental O2  --Bipap    GI:   --CT: distended gallbladder with calcified stones, pericholecystic edema, and gallbladder wall thickening consistent with acute cholecystitis. Common duct dilated up to 1.5 cm with questionable distal common duct stone.  --MRCP: acute cholecystitis with a 2.5 cm stone at the gallbladder neck and 13 mm common bile duct without evidence for choledocholithiasis.  --Perc drainage on 10/30 for acute cholecystitis  --Bile Fluid Gram Stain (10/30): no PMN leukocytes seen. Numerous gram negative rods seen.  --FU GI, may need ERCP  --FU surgery  --NPO  --WBC improving 23.96 --> 13.58, continue to trend  --bandemia: 30% --> 31%  --alk phos increased from 300 --> 482. continue to trend  --LFTs increased.  --> 295,  --> 164. continue to trend  --Tbili increased at 4.2. continue to trend  --amylase and lipase decreased at 19 and 51, respectively  --lactic acidosis  --ferrous sulfate 325mg PO TID  --calcium carbonate + vit D 1 tablet PO daily  --ursodiol 300mg PO BID    :  --ESRD on HD ([facility])  --HD this am, missed Monday's session  --Nephrology Dr. Albarran consulted    Endo:  --DM2 on insulin  --humalog sliding scale subQ q6h  --lantus 12 units subQ at bedtime  --hypoglycemic protocol  -- on admission, improving at 130. Continue to monitor blood sugar levels    ID:  --sepsis likely 2/2 cholecystitis with acute choledocholithiasis     --leukocytosis with bandemia, lactic acidosis, transaminitis, elevated alk phos 300 with Tbili 4.2  --febrile upon admission at 103F, has been afebrile this morning   --continue zosyn 3.375g q12h for acute kevin/ascending cholangitis  --lactate: 2.3 --> 2.2 --> 2.5. Repeat ordered   --blood culture (10/29): gram negative rods  --Repeat cultures ordered     PPx:  --heparin 5000units subQ q8h for DVT ppx  --Protonix 40mg IV qd    Tubes & lines:  --Left IJ placed today  --R subclavian permacath 68y old female pmhx CAD s/p stent (2007), ESRD on HD (MWF; missed monday's session), DM2, HTN, HLD, anemia of chronic kidney disease admitted with severe sepsis secondary to acute cholecystitis.     Plan:    Neuro:  --dilaudid 0.5mg IV q4h PRN for pain    Cardio:  --hemodynamic monitoring  --phenylephrine titrate for MAP>70  --Afib with rvr. Received amio and digoxin .5mg IV. Rate improved.  --metoprolol tartrate 5mg IV q6h  --hold ASA, plavix, imdur, nifedipine and crestor  --Trops trending down at .308   --Cardio consulted, input appreciated   --Reviewed EKG (10/30): SR with RBBB    Resp:  --Acute respiratory failure  --supplemental O2  --Bipap    GI:   --CT: distended gallbladder with calcified stones, pericholecystic edema, and gallbladder wall thickening consistent with acute cholecystitis. Common duct dilated up to 1.5 cm with questionable distal common duct stone.  --MRCP: acute cholecystitis with a 2.5 cm stone at the gallbladder neck and 13 mm common bile duct without evidence for choledocholithiasis.  --Perc drainage on 10/30 for acute cholecystitis  --Bile Fluid Gram Stain (10/30): no PMN leukocytes seen. Numerous gram negative rods seen.  --FU GI, may need ERCP  --FU surgery  --NPO   --WBC improving 23.96 --> 13.58, continue to trend  --bandemia: 30% --> 31%  --alk phos trending down at 262. continue to trend  --LFTs increased.  --> 295 --> 272,  --> 164 -->208. continue to trend  --Tbili increased at 4.3. continue to trend  --amylase and lipase decreased at 19 and 51, respectively.  --lactic acidosis  --ferrous sulfate 325mg PO TID  --calcium carbonate + vit D 1 tablet PO daily  --ursodiol 300mg PO BID    :  --ESRD on HD ([facility])  --HD this am, missed Monday's session  --Nephrology Dr. Albarran consulted    Endo:  --DM2 on insulin  --humalog sliding scale subQ q6h  --lantus 12 units subQ at bedtime  ?d/c due to NPO status and serum glucose wnl, can restart when diet initiated   --hypoglycemic protocol  -- on admission, improving at 130. Continue to monitor blood sugar levels    ID:  --sepsis likely 2/2 cholecystitis with acute choledocholithiasis     --leukocytosis with bandemia, lactic acidosis, transaminitis, elevated alk phos 300 with Tbili 4.2  --febrile upon admission at 103F, has been afebrile this morning   --continue zosyn 3.375g q12h for acute kevin/ascending cholangitis  --Repeat lactate increased to 2.7. Started LR at 60cc/hr.  --blood culture (10/29): gram negative rods  --Repeat cultures ordered     PPx:  --heparin 5000units subQ q8h for DVT ppx  --Protonix 40mg IV qd    Tubes & lines:  --Left IJ placed today  --R subclavian permacath 68y old female pmhx CAD s/p stent (2007), ESRD on HD (MWF; missed monday's session), DM2, HTN, HLD, anemia of chronic kidney disease admitted with severe sepsis secondary to acute cholecystitis.     Plan:    Neuro:  --dilaudid 0.5mg IV q4h PRN for pain    Cardio:  --hemodynamic monitoring  --phenylephrine titrate for MAP>70  --Afib with rvr. Received amio and digoxin .5mg IV. Rate improved.  --metoprolol tartrate 5mg IV q6h  --hold ASA, plavix, imdur, nifedipine and crestor  --Trops trending down at .308   --Cardio consulted, input appreciated   --Reviewed EKG (10/30): SR with RBBB    Resp:  --Acute respiratory failure  --supplemental O2  --Bipap    GI:   --CT: distended gallbladder with calcified stones, pericholecystic edema, and gallbladder wall thickening consistent with acute cholecystitis. Common duct dilated up to 1.5 cm with questionable distal common duct stone.  --MRCP: acute cholecystitis with a 2.5 cm stone at the gallbladder neck and 13 mm common bile duct without evidence for choledocholithiasis.  --Perc drainage on 10/30 for acute cholecystitis  --Bile Fluid Gram Stain (10/30): no PMN leukocytes seen. Numerous gram negative rods seen.  --FU GI, may need ERCP  --FU surgery  --NPO   --WBC improving 23.96 --> 13.58, continue to trend  --bandemia: 30% --> 31%  --alk phos trending down at 262. continue to trend  --LFTs increased.  --> 295 --> 272,  --> 164 -->208. continue to trend  --Tbili increased at 4.3. continue to trend  --amylase and lipase decreased at 19 and 51, respectively.  --lactic acidosis  --ferrous sulfate 325mg PO TID  --calcium carbonate + vit D 1 tablet PO daily  --ursodiol 300mg PO BID    :  --ESRD on HD ([facility])  --HD this am, missed Monday's session  --Nephrology Dr. Albarran consulted    Endo:  --DM2 on insulin  --humalog sliding scale subQ q6h  --lantus 12 units subQ at bedtime  ?d/c due to NPO status and serum glucose wnl, can restart when diet initiated   --hypoglycemic protocol  -- on admission, improving at 130. Continue to monitor blood sugar levels    ID:  --sepsis likely 2/2 cholecystitis with acute choledocholithiasis     --leukocytosis with bandemia, lactic acidosis, transaminitis, elevated alk phos 300 with Tbili 4.2  --febrile upon admission at 103F, has been afebrile this morning   --continue zosyn 3.375g q12h for acute kevin/ascending cholangitis  --Repeat lactate decreased 0.9.   --blood culture (10/29): gram negative rods  --Repeat cultures ordered     PPx:  --heparin 5000units subQ q8h for DVT ppx  --Protonix 40mg IV qd    Tubes & lines:  --Left IJ placed today  --R subclavian permacath 68y old female pmhx CAD s/p stent (2007), ESRD on HD (MWF; missed monday's session), DM2, HTN, HLD, anemia of chronic kidney disease admitted with severe sepsis secondary to acute cholecystitis.     Plan:    Neuro:  --tylenol 650g PO for mild pain PRN  --Dilaudid 0.25mg IV for moderate pain PRN  --Dilaudid 0.5mg IV for severe pain PRN    Cardio:  --hemodynamic monitoring  --Afib with rvr. Received amio and digoxin .5mg IV. Rate improved.  --metoprolol tartrate 50mg PO BID  --start ASA 81 mg PO qd  --hold plavix, imdur, nifedipine and crestor  --Trops trending down at .308   --Cardio consulted, input appreciated   --Reviewed EKG (10/30): SR with RBBB    Resp:  --Acute respiratory failure  --supplemental O2  --Bipap    GI:   --CT: distended gallbladder with calcified stones, pericholecystic edema, and gallbladder wall thickening consistent with acute cholecystitis. Common duct dilated up to 1.5 cm with questionable distal common duct stone.  --MRCP: acute cholecystitis with a 2.5 cm stone at the gallbladder neck and 13 mm common bile duct without evidence for choledocholithiasis.  --Perc drainage on 10/30 for acute cholecystitis  --Bile Fluid Gram Stain (10/30): no PMN leukocytes seen. Numerous gram negative rods seen.  --FU GI, may need ERCP  --FU surgery  --NPO   --WBC improving 23.96 --> 13.58 --> 15.4, continue to trend  --bandemia: 30% --> 31%  --alk phos trending down at 262. continue to trend  --LFTs increased.  --> 295 --> 272,  --> 164 -->208. continue to trend  --Tbili increased at 4.3. continue to trend  --amylase and lipase decreased at 19 and 51, respectively.  --lactic acidosis  --ferrous sulfate 325mg PO TID  --calcium carbonate + vit D 1 tablet PO daily  --ursodiol 300mg PO BID  --reglan 10mg IV q8h for nausea and vomiting    :  --ESRD on HD  --HD yesterday, will continue with TTS dialysis schedule this week  --Next week back to regular [facility] dialysis schedule   --Nephrology Dr. Albarran consulted    Heme:  --H/H trending down at 10.3/34.3 today, continue to trend  --epogen as needed for anemia    Endo:  --DM2 on insulin  --humalog sliding scale subQ q6h  --hypoglycemic protocol  -- on admission, improving at 130. Continue to monitor blood sugar levels    ID:  --sepsis likely 2/2 cholecystitis with acute choledocholithiasis     --leukocytosis with bandemia, lactic acidosis, transaminitis, elevated alk phos 300 with Tbili 4.2  --febrile upon admission at 103F, has been afebrile this morning   --continue zosyn 3.375g q12h for acute kevin/ascending cholangitis  --Repeat lactate decreased 0.9.   --blood culture (10/29), (10/30): gram negative rods  --Repeat cultures ordered     PPx:  --heparin 5000units subQ q8h for DVT ppx  --Protonix 40mg IV qd    Tubes & lines:  --Left IJ planned to be removed 10/31  --R subclavian permacath 68y old female pmhx CAD s/p stent (2007), ESRD on HD (MWF; missed monday's session), DM2, HTN, HLD, anemia of chronic kidney disease admitted with severe sepsis secondary to acute cholecystitis.     Plan:    Neuro:  --tylenol 650g PO for mild pain PRN  --Dilaudid 0.25mg IV for moderate pain PRN  --Dilaudid 0.5mg IV for severe pain PRN    Cardio:  --hemodynamic monitoring  --Afib with rvr. Received amio and digoxin .5mg IV. Rate improved.  --metoprolol tartrate 50mg PO BID  --start ASA 81 mg PO qd  --hold plavix, imdur, nifedipine and crestor  --Trops trending down at .308   --Cardio consulted, input appreciated   --Reviewed EKG (10/30): SR with RBBB    Resp:  --Acute respiratory failure resolved  --supplemental O2 with nasal cannula  --Bipap prn    GI:   --CT: distended gallbladder with calcified stones, pericholecystic edema, and gallbladder wall thickening consistent with acute cholecystitis. Common duct dilated up to 1.5 cm with questionable distal common duct stone.  --MRCP: acute cholecystitis with a 2.5 cm stone at the gallbladder neck and 13 mm common bile duct without evidence for choledocholithiasis.  --Perc drainage on 10/30 for acute cholecystitis  --Bile Fluid Gram Stain (10/30): no PMN leukocytes seen. Numerous gram negative rods seen.  --FU GI, may need ERCP  --FU surgery  --NPO   --WBC improving 23.96 --> 13.58 --> 15.4, continue to trend  --bandemia: 30% --> 31%  --alk phos trending down at 262. continue to trend  --LFTs increased.  --> 295 --> 272,  --> 164 -->208. continue to trend  --Tbili increased at 4.3. continue to trend  --amylase and lipase decreased at 19 and 51, respectively.  --lactic acidosis resolved  --ferrous sulfate 325mg PO TID  --calcium carbonate + vit D 1 tablet PO daily  --ursodiol 300mg PO BID  --reglan 10mg IV q8h for nausea and vomiting. QT checked on ECG    :  --ESRD on HD  --HD yesterday with fluid removal, will continue with TTS dialysis schedule this week  --Next week back to regular [facility] dialysis schedule   --Nephrology Dr. Albarran consulted    Heme:  --H/H trending down at 10.3/34.3 today, continue to trend  --epogen as needed for anemia    Endo:  --DM2 on insulin  --humalog sliding scale subQ q6h, holding lantus. Goal -180  --hypoglycemic protocol  -- on admission, improving at 130. Continue to monitor blood sugar levels    ID:  --sepsis likely 2/2 cholecystitis with acute cholelithiasis     --leukocytosis with bandemia, lactic acidosis, transaminitis, elevated alk phos 300 with Tbili 4.2  --febrile upon admission at 103F, has been afebrile this morning   --continue zosyn 3.375g q12h for acute kevin/ascending cholangitis  --Repeat lactate decreased 0.9.   --blood culture (10/29), (10/30): gram negative rods  --Repeat cultures ordered     PPx:  --heparin 5000units subQ q8h for DVT ppx  --Protonix 40mg IV qd    Tubes & lines:  --Left IJ planned to be removed 10/31  --R subclavian permacath

## 2018-10-31 NOTE — PROCEDURE NOTE - NSPROCDETAILS_GEN_ALL_CORE
nasogastric/placement confirmed by auscultation/gastric secretions aspirated, placement confirmed/bowel sounds present to 4 quadrants/connected to suction/audible air bolus
guidewire recovered/lumen(s) aspirated and flushed/sterile dressing applied/ultrasound guidance/sterile technique, catheter placed

## 2018-10-31 NOTE — PROGRESS NOTE ADULT - PROBLEM SELECTOR PLAN 1
2/2 acute calculus cholecystitis with biliary dilatation with common bile duct stone.   Pt was hypotensive and on levophed.  She has increase in WBCs and bands.   Blood Cx + for GNR. c/w zosyn.  ID consult f/u 2/2 acute calculus cholecystitis with biliary dilatation with common bile duct stone.   Ac Respiratory failure resolved. on supplemental O2 with nasal cannula  Improving BP and breathing. lactic acidosis resolved  Improving WBCs, INR, renal indices and LFTs.    Blood Cx + for GNR. E.coli c/w zosyn.  ID consult f/u

## 2018-10-31 NOTE — CHART NOTE - NSCHARTNOTEFT_GEN_A_CORE
Resident Procedure Note    Patient seen and examined at bedside for removal of central line. Area cleaned and suture removal kit used to remove sutures holding central line in place. Patient then placed in reverse Trendelenburg position, asked to hold breath, and then central line removed. Pressure applied for 5-10 minutes with gauze. No signs of active bleeding noted. Tegaderm and gauze used to create pressure dressing to maintain pressure on central line site. Patient tolerated well without complications. Will continue to follow. RN to call/page if any changes.

## 2018-10-31 NOTE — PROGRESS NOTE ADULT - PROBLEM SELECTOR PLAN 5
Was hypertensive and now has low BP on levophed.  2/2 septic shock BP stable, no anti hypertensives.

## 2018-10-31 NOTE — PROGRESS NOTE ADULT - PROBLEM SELECTOR PLAN 3
s/p stent in 2007. trops are .373 and .348  c/w asa and holding plavix  Cardio recomm appreciated. s/p stent in 2007. trops are trending down at .308.  Afib with RVR. Received amio and digoxin .5mg IV. Rate improved.  C/W Asa and holding  plavix  Cardio recomm appreciated.

## 2018-10-31 NOTE — PROGRESS NOTE ADULT - PROBLEM SELECTOR PLAN 6
Hold oral meds   -c/w lantus and low dose insulin sliding scale per ICU recs  - accuchecks  -HgA1c 9.7  on admission, improving at 130. Continue to monitor   -c/w lantus and low dose insulin sliding scale per ICU recs  - accuchecks  -HgA1c 9.7

## 2018-10-31 NOTE — CONSULT NOTE ADULT - CONSULT REASON
Cardiac clearance for procedure and elevated Trop
ESRD on HD
GNR septicemia
Severe Sepsis
cholecystitis
inc lfts

## 2018-10-31 NOTE — PROGRESS NOTE ADULT - PROBLEM SELECTOR PLAN 4
HD MWF. Patient missed HD   had HD to day  Nephro f/u. HD MWF. Patient missed HD   had HD yesterday with fluid removal, will continue with TTS dialysis  Nephro f/u.

## 2018-10-31 NOTE — PROGRESS NOTE ADULT - SUBJECTIVE AND OBJECTIVE BOX
INTERVAL HPI/OVERNIGHT EVENTS:  pt seen and examined, family present  pt c/o nausea and abd pain  per rn with three small episodes of non bloody vomiting, +bm yesterday  labs noted  sp ir for perc kevin tube    MEDICATIONS  (STANDING):  aspirin enteric coated 81 milliGRAM(s) Oral daily  calcium carbonate 1250 mG  + Vitamin D (OsCal 500 + D) 1 Tablet(s) Oral daily  chlorhexidine 2% Cloths 1 Application(s) Topical daily  chlorhexidine 2% Cloths 1 Application(s) Topical daily  dextrose 5%. 1000 milliLiter(s) (50 mL/Hr) IV Continuous <Continuous>  dextrose 50% Injectable 12.5 Gram(s) IV Push once  dextrose 50% Injectable 25 Gram(s) IV Push once  dextrose 50% Injectable 25 Gram(s) IV Push once  ferrous    sulfate 325 milliGRAM(s) Oral three times a day  heparin  Injectable 5000 Unit(s) SubCutaneous every 8 hours  insulin lispro (HumaLOG) corrective regimen sliding scale   SubCutaneous every 6 hours  metoclopramide Injectable 10 milliGRAM(s) IV Push every 8 hours  metoprolol tartrate 50 milliGRAM(s) Oral two times a day  pantoprazole  Injectable 40 milliGRAM(s) IV Push daily  piperacillin/tazobactam IVPB. 3.375 Gram(s) IV Intermittent every 12 hours  ursodiol Capsule 300 milliGRAM(s) Oral every 12 hours    MEDICATIONS  (PRN):  acetaminophen   Tablet .. 650 milliGRAM(s) Oral every 6 hours PRN Mild Pain (1 - 3)  dextrose 40% Gel 15 Gram(s) Oral once PRN Blood Glucose LESS THAN 70 milliGRAM(s)/deciliter  glucagon  Injectable 1 milliGRAM(s) IntraMuscular once PRN Glucose LESS THAN 70 milligrams/deciliter  HYDROmorphone  Injectable 0.25 milliGRAM(s) IV Push every 4 hours PRN Moderate Pain (4 - 6)  HYDROmorphone  Injectable 0.5 milliGRAM(s) IV Push every 4 hours PRN Severe Pain (7 - 10)      Allergies    No Known Drug Allergies  Purell (Rash)    Intolerances        Review of Systems:    General:  No wt loss, fevers, chills, night sweats, fatigue   Eyes:  Good vision, no reported pain  ENT:  No sore throat, pain, runny nose, dysphagia  CV:  No pain, palpitations, hypo/hypertension  Resp:  No dyspnea, cough, tachypnea, wheezing  GI:  +pain, +nausea, No vomiting, No diarrhea, No constipation, No weight loss, No fever, No pruritis, No rectal bleeding, No melena, No dysphagia  :  No pain, bleeding, incontinence, nocturia  Muscle:  No pain, weakness  Neuro:  No weakness, tingling, memory problems  Psych:  No fatigue, insomnia, mood problems, depression  Endocrine:  No polyuria, polydypsia, cold/heat intolerance  Heme:  No petechiae, ecchymosis, easy bruisability  Skin:  No rash, tattoos, scars, edema      Vital Signs Last 24 Hrs  T(C): 36.3 (31 Oct 2018 12:28), Max: 37.5 (31 Oct 2018 00:09)  T(F): 97.4 (31 Oct 2018 12:28), Max: 99.5 (31 Oct 2018 00:09)  HR: 75 (31 Oct 2018 13:00) (75 - 993)  BP: 121/56 (31 Oct 2018 13:00) (100/62 - 146/63)  BP(mean): 81 (31 Oct 2018 13:00) (71 - 103)  RR: 13 (31 Oct 2018 13:00) (8 - 31)  SpO2: 98% (31 Oct 2018 13:00) (82% - 100%)    PHYSICAL EXAM:    Constitutional: NAD, lying in bed  HEENT: ncat  Neck: No LAD  Respiratory: dec bs  Cardiovascular: S1 and S2, RRR  Gastrointestinal: soft ttp mild dt perc kevin tube in place w +output  Extremities: No peripheral edema  Vascular: 2+ peripheral pulses  Neurological: awake alert  Skin: No rashes      LABS:                        10.3   15.40 )-----------( 122      ( 31 Oct 2018 07:08 )             34.3     10-    137  |  101  |  32<H>  ----------------------------<  130<H>  4.3   |  28  |  4.10<H>    Ca    8.2<L>      31 Oct 2018 07:08  Phos  4.0     10-  Mg     2.0     10-    TPro  6.8  /  Alb  2.1<L>  /  TBili  4.3<H>  /  DBili  x   /  AST  272<H>  /  ALT  208<H>  /  AlkPhos  262<H>  10-31    PT/INR - ( 31 Oct 2018 07:08 )   PT: 18.7 sec;   INR: 1.62 ratio         PTT - ( 30 Oct 2018 05:49 )  PTT:34.1 sec  Urinalysis Basic - ( 29 Oct 2018 15:30 )    Color: Yellow / Appearance: Clear / S.005 / pH: x  Gluc: x / Ketone: Negative  / Bili: Small / Urobili: Negative   Blood: x / Protein: 500 mg/dL / Nitrite: Negative   Leuk Esterase: Negative / RBC: 3-5 /HPF / WBC 0-2   Sq Epi: x / Non Sq Epi: Few / Bacteria: Few        RADIOLOGY & ADDITIONAL TESTS:

## 2018-10-31 NOTE — PROGRESS NOTE ADULT - PROBLEM SELECTOR PLAN 2
Ac cholecystitis with gall stones, cholangitis and choledocholithiasis  S/P MRCP no stone in CBD, may have passed?  Increase in AST/ALT, ? 2/2 sepsis.  S/P P/C cholecystoscopy and drainage of pus.  Follow with fluid cx result  further management per ICU Ac cholecystitis with gall stones, cholangitis and choledocholithiasis  S/P MRCP no stone in CBD, FU GI, may need ERCP  improving in AST/ALT, c/w ursodiol.  S/P P/C cholecystoscopy and drainage of bile.  fluid cx + for GNR, c/w Zosyn  further management per ICU

## 2018-10-31 NOTE — PROGRESS NOTE ADULT - ASSESSMENT
69 yo presents with choleystitis/sepsis. had Perc cholecystostomy tube placed yesterday.  Doing better.  still bilirubin rising      cont abx      cont perc drain       am labs

## 2018-10-31 NOTE — PROGRESS NOTE ADULT - SUBJECTIVE AND OBJECTIVE BOX
Patient is a 68y old  Female who presents with a chief complaint of Sepsis (30 Oct 2018 16:04)      BRIEF HOSPITAL COURSE:   68y old female pmhx CAD s/p stent (), ESRD on HD (MWF; missed today's session), DM, HTN, HLD admitted with severe sepsis secondary to acute cholecystitis.      Events last 24 hours:   This am patient found to be agonally breathing and hypotensive.  Patient started on peripheral vasopressors and placed on BiPAP.  Central line placed.  Patient went for cholecystostomy tube, upon arrival back to ICU patient placed back on BiPAP for severe respiratory distress and received dialysis tonight for which 2L fluid taken off.  Patient given break from BiPAP, but was placed back on ~90 minutes later due to increased work of breathing.  Patient endorses some sob.        PAST MEDICAL & SURGICAL HISTORY:  ESRD (end stage renal disease) on dialysis: MWF  CAD (coronary artery disease): s/p stent in   Diabetes  Myocardial infarction  Hypertension  H/O: hysterectomy    Allergies  No Known Drug Allergies  Purell (Rash)      FAMILY HISTORY:  No pertinent family history in first degree relatives      Social History:   From home.  No hx of eoth, tobacco or illicit drug use.      Review of Systems:  All ROS negative except as noted in past 24 hour events.       Vitals During Exam:   HR: 94  BP: 140/70 mmHg  RR:27  sPO2: 100% on BIPAP     Physical Examination:    General: Elderly, overweight female, lying in bed, comfortable on BiPAP.     HEENT: NC/AT, Pupils equal, reactive to light.  Symmetric. BiPAP in place.     PULM: Symmetrical thorax expansion upon respiration.  Clear to auscultation bilaterally, no significant sputum production appreciated.      CVS: Regular rate and rhythm, no murmurs, rubs, or gallops appreciated.     ABD: Soft, distended, +mildly tender to palpation over right upper quadrant, TENA drain in place, draining well, thin, brown bile fluid appreciated.  Normoactive bowel sounds, no masses appreciated.  +umbilical hernia that is reducible.  Exam limited secondary to distension and body habitus.      EXT: +1 pitting, nontender, DP pulses symmetrical.     SKIN: Warm and well perfused, no rashes noted.     NEURO: Alert, oriented, interactive, nonfocal, moving all 4 extremities.       Medications:  piperacillin/tazobactam IVPB. 3.375 Gram(s) IV Intermittent every 12 hours  metoprolol tartrate Injectable 5 milliGRAM(s) IV Push every 6 hours  phenylephrine    Infusion 0.4 MICROgram(s)/kG/Min IV Continuous <Continuous>  HYDROmorphone  Injectable 0.5 milliGRAM(s) IV Push every 4 hours PRN  heparin  Injectable 5000 Unit(s) SubCutaneous every 8 hours  pantoprazole  Injectable 40 milliGRAM(s) IV Push daily  ursodiol Capsule 300 milliGRAM(s) Oral every 12 hours  dextrose 40% Gel 15 Gram(s) Oral once PRN  dextrose 50% Injectable 12.5 Gram(s) IV Push once  dextrose 50% Injectable 25 Gram(s) IV Push once  dextrose 50% Injectable 25 Gram(s) IV Push once  glucagon  Injectable 1 milliGRAM(s) IntraMuscular once PRN  insulin lispro (HumaLOG) corrective regimen sliding scale   SubCutaneous every 6 hours  calcium carbonate 1250 mG  + Vitamin D (OsCal 500 + D) 1 Tablet(s) Oral daily  dextrose 5%. 1000 milliLiter(s) IV Continuous <Continuous>  ferrous    sulfate 325 milliGRAM(s) Oral three times a day  chlorhexidine 2% Cloths 1 Application(s) Topical daily      ICU Vital Signs Last 24 Hrs  T(C): 37.5 (31 Oct 2018 00:09), Max: 37.7 (30 Oct 2018 04:18)  T(F): 99.5 (31 Oct 2018 00:09), Max: 99.8 (30 Oct 2018 04:18)  HR: 94 (31 Oct 2018 01:00) (77 - 993)  BP: 140/70 (31 Oct 2018 01:00) (70/42 - 200/86)  BP(mean): 97 (31 Oct 2018 01:00) (50 - 130)  ABP: --  ABP(mean): --  RR: 27 (31 Oct 2018 01:00) (11 - 46)  SpO2: 100% (31 Oct 2018 01:00) (82% - 100%)    Vital Signs Last 24 Hrs  T(C): 37.5 (31 Oct 2018 00:09), Max: 37.7 (30 Oct 2018 04:18)  T(F): 99.5 (31 Oct 2018 00:09), Max: 99.8 (30 Oct 2018 04:18)  HR: 94 (31 Oct 2018 01:00) (77 - 993)  BP: 140/70 (31 Oct 2018 01:00) (70/42 - 200/86)  BP(mean): 97 (31 Oct 2018 01:00) (50 - 130)  RR: 27 (31 Oct 2018 01:00) (11 - 46)  SpO2: 100% (31 Oct 2018 01:00) (82% - 100%)    ABG - ( 30 Oct 2018 12:05 )  pH, Arterial: 7.27  pH, Blood: x     /  pCO2: 49    /  pO2: 117   / HCO3: 21    / Base Excess: -3.9  /  SaO2: 97          I&O's Detail    29 Oct 2018 07:01  -  30 Oct 2018 07:00  --------------------------------------------------------  IN:    Lactated Ringers IV Bolus: 2000 mL    Oral Fluid: 180 mL    Sodium Chloride 0.9% IV Bolus: 3000 mL    Solution: 100 mL  Total IN: 5280 mL    OUT:  Total OUT: 0 mL  Total NET: 5280 mL      30 Oct 2018 07:01  -  31 Oct 2018 01:56  --------------------------------------------------------  IN:    norepinephrine Infusion: 122.8 mL    phenylephrine   Infusion: 98 mL    Plasma: 309 mL    Solution: 100 mL    Solution: 100 mL  Total IN: 729.8 mL    OUT:    Drain: 150 mL    Other: 2000 mL  Total OUT: 2150 mL  Total NET: -1420.2 mL      LABS:                        11.1   24.63 )-----------( 158      ( 30 Oct 2018 09:04 )             36.4     10-30    140  |  106  |  57<H>  ----------------------------<  144<H>  4.4   |  23  |  5.90<H>    Ca    7.9<L>      30 Oct 2018 07:37  Phos  4.4     10-30  Mg     1.9     10-30    TPro  6.1  /  Alb  1.9<L>  /  TBili  3.9<H>  /  DBili  3.40<H>  /  AST  279<H>  /  ALT  158<H>  /  AlkPhos  319<H>  10-30      CARDIAC MARKERS ( 30 Oct 2018 22:08 )  .308 ng/mL / x     / 179 U/L / x     / 3.1 ng/mL  CARDIAC MARKERS ( 30 Oct 2018 13:31 )  .373 ng/mL / x     / 155 U/L / x     / 2.7 ng/mL  CARDIAC MARKERS ( 30 Oct 2018 09:04 )  .348 ng/mL / x     / 104 U/L / x     / 1.8 ng/mL      CAPILLARY BLOOD GLUCOSE  POCT Blood Glucose.: 117 mg/dL (31 Oct 2018 00:04)      PT/INR - ( 30 Oct 2018 05:49 )   PT: 22.7 sec;   INR: 2.05 ratio    PTT - ( 30 Oct 2018 05:49 )  PTT:34.1 sec      Urinalysis Basic - ( 29 Oct 2018 15:30 )  Color: Yellow / Appearance: Clear / S.005 / pH: x  Gluc: x / Ketone: Negative  / Bili: Small / Urobili: Negative   Blood: x / Protein: 500 mg/dL / Nitrite: Negative   Leuk Esterase: Negative / RBC: 3-5 /HPF / WBC 0-2   Sq Epi: x / Non Sq Epi: Few / Bacteria: Few      CULTURES:  Culture Results:   No growth (10-29 @ 19:10)  Culture Results:   Growth in aerobic and anaerobic bottles: Gram Negative Rods (10-29 @ 19:05)  Culture Results:   Growth in aerobic and anaerobic bottles: Gram Negative Rods  "Due to technical problems, Proteus sp. will Not be reported as part of  the BCID panel until further notice"  ***Blood Panel PCR results on this specimen are available  approximately 3 hours after the Gram stain result.***  Gram stain, PCR, and/or culture results may not always  correspond due to difference in methodologies.  ************************************************************  This PCR assay was performed using [employer].  The following targets are tested for: Enterococcus,  vancomycin resistant enterococci, Listeria monocytogenes,  coagulase negative staphylococci, S. aureus,  methicillin resistant S. aureus, Streptococcus agalactiae  (Group B), S. pneumoniae, S. pyogenes (Group A),  Acinetobacter baumannii, Enterobacter cloacae, E. coli,  Klebsiella oxytoca, K. pneumoniae, Proteus sp.,  Serratia marcescens, Haemophilus influenzae,  Neisseria meningitidis, Pseudomonas aeruginosa, Candida  albicans, C. glabrata, C krusei, C parapsilosis,  C. tropicalis and the KPC resistance gene. (10-29 @ 19:03)      RADIOLOGY:   < from: Xray Chest 1 View-PORTABLE IMMEDIATE (10.30.18 @ 09:36) >  EXAM:  XR CHEST PORTABLE IMMED 1V                            PROCEDURE DATE:  10/30/2018        INTERPRETATION:  Status post central line insertion.    AP chest. Prior 10/29/2018.    Right dialysis catheter reidentified in situ. There has been interval   introduction of a left internal jugular line tip in the right atrium. No   pneumothorax. Low lung volumes. No change cardiomegaly vascular   congestion. No gross focal infiltrate or pleural effusion.    Impression: As above    < end of copied text >      SUPPLEMENTAL O2: NC, BiPAP prn   LINES: Peripheral IV, RIJ CVC  BERMUDEZ: N  PPx: Heparin, Pantoprazole   CONTACT: N

## 2018-10-31 NOTE — PROGRESS NOTE ADULT - SUBJECTIVE AND OBJECTIVE BOX
Patient is a 68y old  Female who presents with a chief complaint of Sepsis (31 Oct 2018 07:06)      Patient seen in follow up for ESRD on HD. s/p Perc Cholecystostomy    PAST MEDICAL HISTORY:  ESRD (end stage renal disease) on dialysis  CAD (coronary artery disease)  Diabetes  CRF (chronic renal failure)  Hypertension  Pneumonia  Myocardial infarction  Hypertension  Diabetes    MEDICATIONS  (STANDING):  calcium carbonate 1250 mG  + Vitamin D (OsCal 500 + D) 1 Tablet(s) Oral daily  chlorhexidine 2% Cloths 1 Application(s) Topical daily  dextrose 5%. 1000 milliLiter(s) (50 mL/Hr) IV Continuous <Continuous>  dextrose 50% Injectable 12.5 Gram(s) IV Push once  dextrose 50% Injectable 25 Gram(s) IV Push once  dextrose 50% Injectable 25 Gram(s) IV Push once  ferrous    sulfate 325 milliGRAM(s) Oral three times a day  heparin  Injectable 5000 Unit(s) SubCutaneous every 8 hours  insulin lispro (HumaLOG) corrective regimen sliding scale   SubCutaneous every 6 hours  metoclopramide Injectable 10 milliGRAM(s) IV Push every 8 hours  metoprolol tartrate Injectable 5 milliGRAM(s) IV Push every 6 hours  pantoprazole  Injectable 40 milliGRAM(s) IV Push daily  piperacillin/tazobactam IVPB. 3.375 Gram(s) IV Intermittent every 12 hours  ursodiol Capsule 300 milliGRAM(s) Oral every 12 hours    MEDICATIONS  (PRN):  acetaminophen   Tablet .. 650 milliGRAM(s) Oral every 6 hours PRN Mild Pain (1 - 3)  dextrose 40% Gel 15 Gram(s) Oral once PRN Blood Glucose LESS THAN 70 milliGRAM(s)/deciliter  glucagon  Injectable 1 milliGRAM(s) IntraMuscular once PRN Glucose LESS THAN 70 milligrams/deciliter  HYDROmorphone  Injectable 0.25 milliGRAM(s) IV Push every 4 hours PRN Severe Pain (7 - 10)    T(C): 36.9 (10-31-18 @ 08:42), Max: 37.7 (10-30-18 @ 04:18)  HR: 78 (10-31-18 @ 10:00) (77 - 993)  BP: 127/60 (10-31-18 @ 10:00) (70/42 - 200/86)  RR: 13 (10-31-18 @ 10:00) (8 - 46)  SpO2: 99% (10-31-18 @ 10:00) (82% - 100%)  Wt(kg): --  I&O's Detail    30 Oct 2018 07:  -  31 Oct 2018 07:00  --------------------------------------------------------  IN:    norepinephrine Infusion: 122.8 mL    Oral Fluid: 60 mL    phenylephrine   Infusion: 112.4 mL    Plasma: 309 mL    Solution: 100 mL    Solution: 100 mL  Total IN: 804.2 mL    OUT:    Drain: 250 mL    Other: 2000 mL  Total OUT: 2250 mL    Total NET: -1445.8 mL      31 Oct 2018 07:01  -  31 Oct 2018 10:18  --------------------------------------------------------  IN:    Solution: 100 mL  Total IN: 100 mL    OUT:  Total OUT: 0 mL    Total NET: 100 mL      PHYSICAL EXAM:  General: NAD, Rt chest dialysis catheter  Respiratory: b/l air entry  Cardiovascular: S1 S2  Gastrointestinal: soft  Extremities:  no edema                          10.3   15.40 )-----------( 122      ( 31 Oct 2018 07:08 )             34.3     10-31    137  |  101  |  32<H>  ----------------------------<  130<H>  4.3   |  28  |  4.10<H>    Ca    8.2<L>      31 Oct 2018 07:08  Phos  4.0     10-31  Mg     2.0     10-31    TPro  6.8  /  Alb  2.1<L>  /  TBili  4.3<H>  /  DBili  x   /  AST  272<H>  /  ALT  208<H>  /  AlkPhos  262<H>  10    CARDIAC MARKERS ( 30 Oct 2018 22:08 )  .308 ng/mL / x     / 179 U/L / x     / 3.1 ng/mL  CARDIAC MARKERS ( 30 Oct 2018 13:31 )  .373 ng/mL / x     / 155 U/L / x     / 2.7 ng/mL  CARDIAC MARKERS ( 30 Oct 2018 09:04 )  .348 ng/mL / x     / 104 U/L / x     / 1.8 ng/mL      LIVER FUNCTIONS - ( 31 Oct 2018 07:08 )  Alb: 2.1 g/dL / Pro: 6.8 g/dL / ALK PHOS: 262 U/L / ALT: 208 U/L / AST: 272 U/L / GGT: x           Urinalysis Basic - ( 29 Oct 2018 15:30 )    Color: Yellow / Appearance: Clear / S.005 / pH: x  Gluc: x / Ketone: Negative  / Bili: Small / Urobili: Negative   Blood: x / Protein: 500 mg/dL / Nitrite: Negative   Leuk Esterase: Negative / RBC: 3-5 /HPF / WBC 0-2   Sq Epi: x / Non Sq Epi: Few / Bacteria: Few      ABG - ( 30 Oct 2018 12:05 )  pH, Arterial: 7.27  pH, Blood: x     /  pCO2: 49    /  pO2: 117   / HCO3: 21    / Base Excess: -3.9  /  SaO2: 97          Sodium, Serum: 137 (10-31 @ 07:08)  Sodium, Serum: 140 (10-30 @ 07:37)  Sodium, Serum: 140 (10-30 @ 05:49)  Sodium, Serum: 136 (10-29 @ 15:30)    Creatinine, Serum: 4.10 (10-31 @ 07:08)  Creatinine, Serum: 5.90 (10-30 @ 07:37)  Creatinine, Serum: 6.00 (10-30 @ 05:49)  Creatinine, Serum: 5.40 (10-29 @ 15:30)    Potassium, Serum: 4.3 (10-31 @ 07:08)  Potassium, Serum: 4.4 (10-30 @ 07:37)  Potassium, Serum: 4.4 (10-30 @ 05:49)  Potassium, Serum: 5.1 (10-29 @ 15:30)    Hemoglobin: 10.3 (10-31 @ 07:08)  Hemoglobin: 11.1 (10-30 @ 09:04)  Hemoglobin: 10.8 (10-30 @ 05:49)  Hemoglobin: 11.7 (10-29 @ 15:30)

## 2018-10-31 NOTE — PROCEDURE NOTE - ADDITIONAL PROCEDURE DETAILS
NG tube due to intractable nausea/vomiting in setting of acute cholecystitis.      Not included in critical care time.

## 2018-10-31 NOTE — PROGRESS NOTE ADULT - SUBJECTIVE AND OBJECTIVE BOX
Patient is a 68y old  Female who presents with a chief complaint of Sepsis (31 Oct 2018 01:56)    24 hour events: ***    REVIEW OF SYSTEMS  Constitutional: No fever, chills, fatigue  Neuro: No headache, numbness, weakness  Resp: No cough, wheezing, shortness of breath  CVS: No chest pain, palpitations, leg swelling  GI: No abdominal pain, nausea, vomiting, diarrhea   : No dysuria, frequency, incontinence  Skin: No itching, burning, rashes, or lesions   Msk: No joint pain or swelling  Psych: No depression, anxiety, mood swings  Heme: No bleeding    T(F): 99.2 (10-31-18 @ 04:01), Max: 99.5 (10-31-18 @ 00:09)  HR: 80 (10-31-18 @ 06:00) (77 - 993)  BP: 127/55 (10-31-18 @ 06:00) (70/42 - 146/62)  RR: 20 (10-31-18 @ 06:00) (8 - 39)  SpO2: 99% (10-31-18 @ 06:00) (82% - 100%)  Wt(kg): --      CAPILLARY BLOOD GLUCOSE      POCT Blood Glucose.: 131 mg/dL (31 Oct 2018 05:47)  POCT Blood Glucose.: 117 mg/dL (31 Oct 2018 00:04)  POCT Blood Glucose.: 111 mg/dL (30 Oct 2018 18:29)  POCT Blood Glucose.: 189 mg/dL (30 Oct 2018 12:47)    I&O's Summary    10-29 @   -  10-30 @ 07:00  --------------------------------------------------------  IN: 5280 mL / OUT: 0 mL / NET: 5280 mL    10-30 @   -  10-31 @ 06:59  --------------------------------------------------------  IN: 804.2 mL / OUT: 2250 mL / NET: -1445.8 mL      PHYSICAL EXAM  General:   CNS:   HEENT:   Resp:   CVS:   Abd:   Ext:   Skin:     MEDICATIONS  piperacillin/tazobactam IVPB. IV Intermittent    metoprolol tartrate Injectable IV Push  phenylephrine    Infusion IV Continuous    dextrose 40% Gel Oral PRN  dextrose 50% Injectable IV Push  dextrose 50% Injectable IV Push  dextrose 50% Injectable IV Push  glucagon  Injectable IntraMuscular PRN  insulin lispro (HumaLOG) corrective regimen sliding scale SubCutaneous      HYDROmorphone  Injectable IV Push PRN      heparin  Injectable SubCutaneous    pantoprazole  Injectable IV Push  ursodiol Capsule Oral      calcium carbonate 1250 mG  + Vitamin D (OsCal 500 + D) Oral  dextrose 5%. IV Continuous  ferrous    sulfate Oral      chlorhexidine 2% Cloths Topical                            11.1   24.63 )-----------( 158      ( 30 Oct 2018 09:04 )             36.4     Bands 37.0    10    140  |  106  |  57<H>  ----------------------------<  144<H>  4.4   |  23  |  5.90<H>    Ca    7.9<L>      30 Oct 2018 07:37  Phos  4.4     10  Mg     1.9     10-30    TPro  6.1  /  Alb  1.9<L>  /  TBili  3.9<H>  /  DBili  3.40<H>  /  AST  279<H>  /  ALT  158<H>  /  AlkPhos  319<H>  10-30    Lactate 0.9           10-30 @ 17:47    Lactate 2.7           10-30 @ 09:25      CARDIAC MARKERS ( 30 Oct 2018 22:08 )  .308 ng/mL / x     / 179 U/L / x     / 3.1 ng/mL  CARDIAC MARKERS ( 30 Oct 2018 13:31 )  .373 ng/mL / x     / 155 U/L / x     / 2.7 ng/mL  CARDIAC MARKERS ( 30 Oct 2018 09:04 )  .348 ng/mL / x     / 104 U/L / x     / 1.8 ng/mL      PT/INR - ( 30 Oct 2018 05:49 )   PT: 22.7 sec;   INR: 2.05 ratio         PTT - ( 30 Oct 2018 05:49 )  PTT:34.1 sec  Urinalysis Basic - ( 29 Oct 2018 15:30 )    Color: Yellow / Appearance: Clear / S.005 / pH: x  Gluc: x / Ketone: Negative  / Bili: Small / Urobili: Negative   Blood: x / Protein: 500 mg/dL / Nitrite: Negative   Leuk Esterase: Negative / RBC: 3-5 /HPF / WBC 0-2   Sq Epi: x / Non Sq Epi: Few / Bacteria: Few      Bile Bile Fluid --   No polymorphonuclear leukocytes seen per low power field  Numerous Gram Negative Rods seen per oil power field 10-30 @ 21:29  .Urine Clean Catch (Midstream)   No growth -- 10-29 @ 19:10  .Blood Blood-Peripheral   Growth in aerobic and anaerobic bottles: Gram Negative Rods   Growth in aerobic and anaerobic bottles: Gram Negative Rods 10-29 @ 19:05  .Blood Blood-Peripheral   Growth in aerobic and anaerobic bottles: Gram Negative Rods  "Due to technical problems, Proteus sp. will Not be reported as part of  the BCID panel until further notice"  ***Blood Panel PCR results on this specimen are available  approximately 3 hours after the Gram stain result.***  Gram stain, PCR, and/or culture results may not always  correspond due to difference in methodologies.  ************************************************************  This PCR assay was performed using [employer].  The following targets are tested for: Enterococcus,  vancomycin resistant enterococci, Listeria monocytogenes,  coagulase negative staphylococci, S. aureus,  methicillin resistant S. aureus, Streptococcus agalactiae  (Group B), S. pneumoniae, S. pyogenes (Group A),  Acinetobacter baumannii, Enterobacter cloacae, E. coli,  Klebsiella oxytoca, K. pneumoniae, Proteus sp.,  Serratia marcescens, Haemophilus influenzae,  Neisseria meningitidis, Pseudomonas aeruginosa, Candida  albicans, C. glabrata, C krusei, C parapsilosis,  C. tropicalis and the KPC resistance gene.   Growth in aerobic and anaerobic bottles: Gram Negative Rods 10-29 @ 19:03      Rapid RVP Result: NotDetec (10-29 @ 15:30)    Radiology: ***  Bedside lung ultrasound: ***  Bedside ECHO: ***    CENTRAL LINE: Y/N          DATE INSERTED:              REMOVE: Y/N  BERMUDEZ: Y/N                        DATE INSERTED:              REMOVE: Y/N  A-LINE: Y/N                       DATE INSERTED:              REMOVE: Y/N    GLOBAL ISSUE/BEST PRACTICE  Analgesia:   Sedation:   CAM-ICU:   HOB elevation: yes  Stress ulcer prophylaxis:   VTE prophylaxis:   Glycemic control:   Nutrition:     CODE STATUS: ***  [facility] discussion: Y Patient is a 68y old  Female who presents with a chief complaint of Sepsis (31 Oct 2018 01:56)    24 hour events:  --Afib with rvr. Received amiodarone 150mg IV and digoxin .5mg IV. Rate improved. Will continue to monitor.  --Bile culture showed no PMN leukocytes and gram negative rods. Will continue current tx with zosyn.  --was give zofran for nausea this morning    REVIEW OF SYSTEMS  Constitutional: No fever, chills, fatigue  Neuro: No headache, numbness, weakness  Resp: No cough, wheezing, shortness of breath  CVS: No chest pain, palpitations, leg swelling  GI: No abdominal pain, nausea, vomiting, diarrhea   : No dysuria, frequency, incontinence  Skin: No itching, burning, rashes, or lesions   Msk: No joint pain or swelling    T(F): 99.2 (10-31-18 @ 04:01), Max: 99.5 (10-31-18 @ 00:09)  HR: 80 (10-31-18 @ 06:00) (77 - 993)  BP: 127/55 (10-31-18 @ 06:00) (70/42 - 146/62)  RR: 20 (10-31-18 @ 06:00) (8 - 39)  SpO2: 99% (10-31-18 @ 06:00) (82% - 100%)  Wt(kg): --      CAPILLARY BLOOD GLUCOSE      POCT Blood Glucose.: 131 mg/dL (31 Oct 2018 05:47)  POCT Blood Glucose.: 117 mg/dL (31 Oct 2018 00:04)  POCT Blood Glucose.: 111 mg/dL (30 Oct 2018 18:29)  POCT Blood Glucose.: 189 mg/dL (30 Oct 2018 12:47)    I&O's Summary    10-29 @ 07:01  -  10-30 @ 07:00  --------------------------------------------------------  IN: 5280 mL / OUT: 0 mL / NET: 5280 mL    10-30 @ 07:01  -  10-31 @ 06:59  --------------------------------------------------------  IN: 804.2 mL / OUT: 2250 mL / NET: -1445.8 mL      PHYSICAL EXAM  General:   CNS:   HEENT:   Resp:   CVS:   Abd:   Ext:   Skin:     MEDICATIONS  piperacillin/tazobactam IVPB. IV Intermittent  metoprolol tartrate Injectable IV Push  phenylephrine    Infusion IV Continuous  dextrose 40% Gel Oral PRN  dextrose 50% Injectable IV Push  dextrose 50% Injectable IV Push  dextrose 50% Injectable IV Push  glucagon  Injectable IntraMuscular PRN  insulin lispro (HumaLOG) corrective regimen sliding scale SubCutaneous  HYDROmorphone  Injectable IV Push PRN  heparin  Injectable SubCutaneous  pantoprazole  Injectable IV Push  ursodiol Capsule Oral  calcium carbonate 1250 mG  + Vitamin D (OsCal 500 + D) Oral  dextrose 5%. IV Continuous  ferrous    sulfate Oral  chlorhexidine 2% Cloths Topical                            11.1   24.63 )-----------( 158      ( 30 Oct 2018 09:04 )             36.4     Bands 37.0    10    140  |  106  |  57<H>  ----------------------------<  144<H>  4.4   |  23  |  5.90<H>    Ca    7.9<L>      30 Oct 2018 07:37  Phos  4.4     10-30  Mg     1.9     10-30    TPro  6.1  /  Alb  1.9<L>  /  TBili  3.9<H>  /  DBili  3.40<H>  /  AST  279<H>  /  ALT  158<H>  /  AlkPhos  319<H>  10-30    Lactate 0.9           10-30 @ 17:47    Lactate 2.7           10-30 @ 09:25      CARDIAC MARKERS ( 30 Oct 2018 22:08 )  .308 ng/mL / x     / 179 U/L / x     / 3.1 ng/mL  CARDIAC MARKERS ( 30 Oct 2018 13:31 )  .373 ng/mL / x     / 155 U/L / x     / 2.7 ng/mL  CARDIAC MARKERS ( 30 Oct 2018 09:04 )  .348 ng/mL / x     / 104 U/L / x     / 1.8 ng/mL      PT/INR - ( 30 Oct 2018 05:49 )   PT: 22.7 sec;   INR: 2.05 ratio         PTT - ( 30 Oct 2018 05:49 )  PTT:34.1 sec  Urinalysis Basic - ( 29 Oct 2018 15:30 )    Color: Yellow / Appearance: Clear / S.005 / pH: x  Gluc: x / Ketone: Negative  / Bili: Small / Urobili: Negative   Blood: x / Protein: 500 mg/dL / Nitrite: Negative   Leuk Esterase: Negative / RBC: 3-5 /HPF / WBC 0-2   Sq Epi: x / Non Sq Epi: Few / Bacteria: Few      Bile Bile Fluid --   No polymorphonuclear leukocytes seen per low power field  Numerous Gram Negative Rods seen per oil power field 10-30 @ 21:29  .Urine Clean Catch (Midstream)   No growth -- 10-29 @ 19:10  .Blood Blood-Peripheral   Growth in aerobic and anaerobic bottles: Gram Negative Rods   Growth in aerobic and anaerobic bottles: Gram Negative Rods 10-29 @ 19:05  .Blood Blood-Peripheral   Growth in aerobic and anaerobic bottles: Gram Negative Rods  "Due to technical problems, Proteus sp. will Not be reported as part of  the BCID panel until further notice"  ***Blood Panel PCR results on this specimen are available  approximately 3 hours after the Gram stain result.***  Gram stain, PCR, and/or culture results may not always  correspond due to difference in methodologies.  ************************************************************  This PCR assay was performed using [employer].  The following targets are tested for: Enterococcus,  vancomycin resistant enterococci, Listeria monocytogenes,  coagulase negative staphylococci, S. aureus,  methicillin resistant S. aureus, Streptococcus agalactiae  (Group B), S. pneumoniae, S. pyogenes (Group A),  Acinetobacter baumannii, Enterobacter cloacae, E. coli,  Klebsiella oxytoca, K. pneumoniae, Proteus sp.,  Serratia marcescens, Haemophilus influenzae,  Neisseria meningitidis, Pseudomonas aeruginosa, Candida  albicans, C. glabrata, C krusei, C parapsilosis,  C. tropicalis and the KPC resistance gene.   Growth in aerobic and anaerobic bottles: Gram Negative Rods 10-29 @ 19:03      Rapid RVP Result: NotDetec (10-29 @ 15:30)    Radiology: ***  Bedside lung ultrasound: ***  Bedside ECHO: ***    CENTRAL LINE: Y/N          DATE INSERTED:              REMOVE: Y/N  BERMUDEZ: Y/N                        DATE INSERTED:              REMOVE: Y/N  A-LINE: Y/N                       DATE INSERTED:              REMOVE: Y/N    GLOBAL ISSUE/BEST PRACTICE  Analgesia:   Sedation:   CAM-ICU:   HOB elevation: yes  Stress ulcer prophylaxis: yes  VTE prophylaxis: yes  Glycemic control: yes  Nutrition: NPO    CODE STATUS: ***  GOC discussion: Y Patient is a 68y old  Female who presents with a chief complaint of Sepsis (31 Oct 2018 01:56)    24 hour events:  --Afib with rvr. Received amiodarone 150mg IV and digoxin .5mg IV. Rate improved. Will continue to monitor.  --Bile culture showed no PMN leukocytes and gram negative rods. Will continue current tx with zosyn.  --was give zofran for nausea this morning    REVIEW OF SYSTEMS  Unable to obtain secondary to mental status.    T(F): 99.2 (10-31-18 @ 04:01), Max: 99.5 (10-31-18 @ 00:09)  HR: 80 (10-31-18 @ 06:00) (77 - 993)  BP: 127/55 (10-31-18 @ 06:00) (70/42 - 146/62)  RR: 20 (10-31-18 @ 06:00) (8 - 39)  SpO2: 99% (10-31-18 @ 06:00) (82% - 100%)  Wt(kg): --      CAPILLARY BLOOD GLUCOSE      POCT Blood Glucose.: 131 mg/dL (31 Oct 2018 05:47)  POCT Blood Glucose.: 117 mg/dL (31 Oct 2018 00:04)  POCT Blood Glucose.: 111 mg/dL (30 Oct 2018 18:29)  POCT Blood Glucose.: 189 mg/dL (30 Oct 2018 12:47)    I&O's Summary    10-29 @ :  -  10-30 @ 07:00  --------------------------------------------------------  IN: 5280 mL / OUT: 0 mL / NET: 5280 mL    10-30 @ :01  -  10-31 @ 06:59  --------------------------------------------------------  IN: 804.2 mL / OUT: 2250 mL / NET: -1445.8 mL      PHYSICAL EXAM  General:   CNS:   HEENT:   Resp:   CVS:   Abd:   Ext:   Skin:     MEDICATIONS  piperacillin/tazobactam IVPB. IV Intermittent  metoprolol tartrate Injectable IV Push  phenylephrine    Infusion IV Continuous  dextrose 40% Gel Oral PRN  dextrose 50% Injectable IV Push  dextrose 50% Injectable IV Push  dextrose 50% Injectable IV Push  glucagon  Injectable IntraMuscular PRN  insulin lispro (HumaLOG) corrective regimen sliding scale SubCutaneous  HYDROmorphone  Injectable IV Push PRN  heparin  Injectable SubCutaneous  pantoprazole  Injectable IV Push  ursodiol Capsule Oral  calcium carbonate 1250 mG  + Vitamin D (OsCal 500 + D) Oral  dextrose 5%. IV Continuous  ferrous    sulfate Oral  chlorhexidine 2% Cloths Topical                                 10.3   15.40 )-----------( 122      ( 31 Oct 2018 07:08 )             34.3     10-31    137  |  101  |  32<H>  ----------------------------<  130<H>  4.3   |  28  |  4.10<H>    Ca    8.2<L>      31 Oct 2018 07:08  Phos  4.0     10-31  Mg     2.0     10-31    TPro  6.8  /  Alb  2.1<L>  /  TBili  4.3<H>  /  DBili  x   /  AST  272<H>  /  ALT  208<H>  /  AlkPhos  262<H>  10-31      Lactate 0.9           10-30 @ 17:47    Lactate 2.7           10-30 @ 09:25      CARDIAC MARKERS ( 30 Oct 2018 22:08 )  .308 ng/mL / x     / 179 U/L / x     / 3.1 ng/mL  CARDIAC MARKERS ( 30 Oct 2018 13:31 )  .373 ng/mL / x     / 155 U/L / x     / 2.7 ng/mL  CARDIAC MARKERS ( 30 Oct 2018 09:04 )  .348 ng/mL / x     / 104 U/L / x     / 1.8 ng/mL      PT/INR - ( 30 Oct 2018 05:49 )   PT: 22.7 sec;   INR: 2.05 ratio         PTT - ( 30 Oct 2018 05:49 )  PTT:34.1 sec  Urinalysis Basic - ( 29 Oct 2018 15:30 )    Color: Yellow / Appearance: Clear / S.005 / pH: x  Gluc: x / Ketone: Negative  / Bili: Small / Urobili: Negative   Blood: x / Protein: 500 mg/dL / Nitrite: Negative   Leuk Esterase: Negative / RBC: 3-5 /HPF / WBC 0-2   Sq Epi: x / Non Sq Epi: Few / Bacteria: Few      Bile Bile Fluid --   No polymorphonuclear leukocytes seen per low power field  Numerous Gram Negative Rods seen per oil power field 10-30 @ 21:29  .Urine Clean Catch (Midstream)   No growth -- 10-29 @ 19:10  .Blood Blood-Peripheral   Growth in aerobic and anaerobic bottles: Gram Negative Rods   Growth in aerobic and anaerobic bottles: Gram Negative Rods 10-29 @ 19:05  .Blood Blood-Peripheral   Growth in aerobic and anaerobic bottles: Gram Negative Rods  "Due to technical problems, Proteus sp. will Not be reported as part of  the BCID panel until further notice"  ***Blood Panel PCR results on this specimen are available  approximately 3 hours after the Gram stain result.***  Gram stain, PCR, and/or culture results may not always  correspond due to difference in methodologies.  ************************************************************  This PCR assay was performed using [employer].  The following targets are tested for: Enterococcus,  vancomycin resistant enterococci, Listeria monocytogenes,  coagulase negative staphylococci, S. aureus,  methicillin resistant S. aureus, Streptococcus agalactiae  (Group B), S. pneumoniae, S. pyogenes (Group A),  Acinetobacter baumannii, Enterobacter cloacae, E. coli,  Klebsiella oxytoca, K. pneumoniae, Proteus sp.,  Serratia marcescens, Haemophilus influenzae,  Neisseria meningitidis, Pseudomonas aeruginosa, Candida  albicans, C. glabrata, C krusei, C parapsilosis,  C. tropicalis and the KPC resistance gene.   Growth in aerobic and anaerobic bottles: Gram Negative Rods 10-29 @ 19:03      Rapid RVP Result: NotDetec (10-29 @ 15:30)    Radiology: ***  Bedside lung ultrasound: ***  Bedside ECHO: ***    CENTRAL LINE: Y/N          DATE INSERTED:              REMOVE: Y/N  BERMUDEZ: Y/N                        DATE INSERTED:              REMOVE: Y/N  A-LINE: Y/N                       DATE INSERTED:              REMOVE: Y/N    GLOBAL ISSUE/BEST PRACTICE  Analgesia:   Sedation:   CAM-ICU:   HOB elevation: yes  Stress ulcer prophylaxis: yes  VTE prophylaxis: yes  Glycemic control: yes  Nutrition: NPO    CODE STATUS: ***  GOC discussion: Y Patient is a 68y old  Female who presents with a chief complaint of Sepsis (31 Oct 2018 01:56)    24 hour events:  --Afib with rvr. Received amiodarone 150mg IV and digoxin .5mg IV. Rate improved. Will continue to monitor.  --Bile culture showed no PMN leukocytes and gram negative rods. Will continue current tx with zosyn.  --was given zofran for vomiting and nausea this morning    REVIEW OF SYSTEMS  Unable to obtain secondary to mental status.    T(F): 99.2 (10-31-18 @ 04:01), Max: 99.5 (10-31-18 @ 00:09)  HR: 80 (10-31-18 @ 06:00) (77 - 993)  BP: 127/55 (10-31-18 @ 06:00) (70/42 - 146/62)  RR: 20 (10-31-18 @ 06:00) (8 - 39)  SpO2: 99% (10-31-18 @ 06:00) (82% - 100%)  Wt(kg): --      CAPILLARY BLOOD GLUCOSE      POCT Blood Glucose.: 131 mg/dL (31 Oct 2018 05:47)  POCT Blood Glucose.: 117 mg/dL (31 Oct 2018 00:04)  POCT Blood Glucose.: 111 mg/dL (30 Oct 2018 18:29)  POCT Blood Glucose.: 189 mg/dL (30 Oct 2018 12:47)    I&O's Summary    10-29 @ :  -  10-30 @ 07:00  --------------------------------------------------------  IN: 5280 mL / OUT: 0 mL / NET: 5280 mL    10-30 @ :  -  10-31 @ 06:59  --------------------------------------------------------  IN: 804.2 mL / OUT: 2250 mL / NET: -1445.8 mL      PHYSICAL EXAM  General: Awake.   HEENT: NCAT   Resp: Non-labored, on 4L NC.   CVS: RRR. S1 and S2 noted. no m/r/g.  Abd: Diffuse tenderness. Perc kevin draining serosanguineous fluid.   Ext: Warm. No calf tenderness. Weak peripheral pulses.       MEDICATIONS  piperacillin/tazobactam IVPB. IV Intermittent  metoprolol tartrate Injectable IV Push  phenylephrine    Infusion IV Continuous  dextrose 40% Gel Oral PRN  dextrose 50% Injectable IV Push  dextrose 50% Injectable IV Push  dextrose 50% Injectable IV Push  glucagon  Injectable IntraMuscular PRN  insulin lispro (HumaLOG) corrective regimen sliding scale SubCutaneous  HYDROmorphone  Injectable IV Push PRN  heparin  Injectable SubCutaneous  pantoprazole  Injectable IV Push  ursodiol Capsule Oral  calcium carbonate 1250 mG  + Vitamin D (OsCal 500 + D) Oral  dextrose 5%. IV Continuous  ferrous    sulfate Oral  chlorhexidine 2% Cloths Topical                                 10.3   15.40 )-----------( 122      ( 31 Oct 2018 07:08 )             34.3     10-31    137  |  101  |  32<H>  ----------------------------<  130<H>  4.3   |  28  |  4.10<H>    Ca    8.2<L>      31 Oct 2018 07:08  Phos  4.0     10-31  Mg     2.0     10-31    TPro  6.8  /  Alb  2.1<L>  /  TBili  4.3<H>  /  DBili  x   /  AST  272<H>  /  ALT  208<H>  /  AlkPhos  262<H>  10-31      Lactate 0.9           10-30 @ 17:47    Lactate 2.7           10-30 @ 09:25      CARDIAC MARKERS ( 30 Oct 2018 22:08 )  .308 ng/mL / x     / 179 U/L / x     / 3.1 ng/mL  CARDIAC MARKERS ( 30 Oct 2018 13:31 )  .373 ng/mL / x     / 155 U/L / x     / 2.7 ng/mL  CARDIAC MARKERS ( 30 Oct 2018 09:04 )  .348 ng/mL / x     / 104 U/L / x     / 1.8 ng/mL      PT/INR - ( 30 Oct 2018 05:49 )   PT: 22.7 sec;   INR: 2.05 ratio         PTT - ( 30 Oct 2018 05:49 )  PTT:34.1 sec  Urinalysis Basic - ( 29 Oct 2018 15:30 )    Color: Yellow / Appearance: Clear / S.005 / pH: x  Gluc: x / Ketone: Negative  / Bili: Small / Urobili: Negative   Blood: x / Protein: 500 mg/dL / Nitrite: Negative   Leuk Esterase: Negative / RBC: 3-5 /HPF / WBC 0-2   Sq Epi: x / Non Sq Epi: Few / Bacteria: Few      Bile Bile Fluid --   No polymorphonuclear leukocytes seen per low power field  Numerous Gram Negative Rods seen per oil power field 10-30 @ 21:29  .Urine Clean Catch (Midstream)   No growth -- 10-29 @ 19:10  .Blood Blood-Peripheral   Growth in aerobic and anaerobic bottles: Gram Negative Rods   Growth in aerobic and anaerobic bottles: Gram Negative Rods 10-29 @ 19:05  .Blood Blood-Peripheral   Growth in aerobic and anaerobic bottles: Gram Negative Rods  "Due to technical problems, Proteus sp. will Not be reported as part of  the BCID panel until further notice"  ***Blood Panel PCR results on this specimen are available  approximately 3 hours after the Gram stain result.***  Gram stain, PCR, and/or culture results may not always  correspond due to difference in methodologies.  ************************************************************  This PCR assay was performed using [employer].  The following targets are tested for: Enterococcus,  vancomycin resistant enterococci, Listeria monocytogenes,  coagulase negative staphylococci, S. aureus,  methicillin resistant S. aureus, Streptococcus agalactiae  (Group B), S. pneumoniae, S. pyogenes (Group A),  Acinetobacter baumannii, Enterobacter cloacae, E. coli,  Klebsiella oxytoca, K. pneumoniae, Proteus sp.,  Serratia marcescens, Haemophilus influenzae,  Neisseria meningitidis, Pseudomonas aeruginosa, Candida  albicans, C. glabrata, C krusei, C parapsilosis,  C. tropicalis and the KPC resistance gene.   Growth in aerobic and anaerobic bottles: Gram Negative Rods 10-29 @ 19:03      Rapid RVP Result: NotDetec (10-29 @ 15:30)    Radiology: ***  Bedside lung ultrasound: ***  Bedside ECHO: ***    CENTRAL LINE: Y (L IJ)          DATE INSERTED: 10/30/18             REMOVE: Y (planned 10/31)  BERMUDEZ: N  (pt is ESRD on dialysis)  A-LINE: N                        GLOBAL ISSUE/BEST PRACTICE  Analgesia: yes  Sedation: no  HOB elevation: yes  Stress ulcer prophylaxis: yes  VTE prophylaxis: yes  Glycemic control: yes  Nutrition: NPO    CODE STATUS: Full  GOC discussion: Y

## 2018-10-31 NOTE — PROCEDURE NOTE - NSPOSTPRCRAD_GEN_A_CORE
guidewire ascertained in Left IJ via POCUS, catheter filtered over guidewire, guidewire removed in entirety. Lung sliding appreciated via POCUS/central line located in the/no pneumothorax/post-procedure radiography performed
post-procedure radiography performed

## 2018-10-31 NOTE — CONSULT NOTE ADULT - ASSESSMENT
68F with multiple medical issues including ESRD/HD, CAD/MI/stent, DM2  Acute cholecystitis  S/P Perc Noa  Bilirubin still elevated-- cholestasis of sepsis vs. Mirrizzi syndrome  E. coli septicemia related to above  No other source elucidated on the basis of history or exam  Still w tender abdomen, not surprising.

## 2018-10-31 NOTE — PROGRESS NOTE ADULT - SUBJECTIVE AND OBJECTIVE BOX
Patient is a 68y old  Female who presents with a chief complaint of Sepsis (30 Oct 2018 13:42)      INTERVAL HPI/OVERNIGHT EVENTS:     MEDICATIONS  (STANDING):  calcium carbonate 1250 mG  + Vitamin D (OsCal 500 + D) 1 Tablet(s) Oral daily  chlorhexidine 2% Cloths 1 Application(s) Topical daily  dextrose 5%. 1000 milliLiter(s) (50 mL/Hr) IV Continuous <Continuous>  dextrose 50% Injectable 12.5 Gram(s) IV Push once  dextrose 50% Injectable 25 Gram(s) IV Push once  dextrose 50% Injectable 25 Gram(s) IV Push once  ferrous    sulfate 325 milliGRAM(s) Oral three times a day  heparin  Injectable 5000 Unit(s) SubCutaneous every 8 hours  insulin glargine Injectable (LANTUS) 12 Unit(s) SubCutaneous at bedtime  insulin lispro (HumaLOG) corrective regimen sliding scale   SubCutaneous every 6 hours  metoprolol tartrate 50 milliGRAM(s) Oral two times a day  norepinephrine Infusion 0.05 MICROgram(s)/kG/Min (8.85 mL/Hr) IV Continuous <Continuous>  pantoprazole  Injectable 40 milliGRAM(s) IV Push daily  piperacillin/tazobactam IVPB. 3.375 Gram(s) IV Intermittent every 12 hours  ursodiol Capsule 300 milliGRAM(s) Oral every 12 hours    MEDICATIONS  (PRN):  dextrose 40% Gel 15 Gram(s) Oral once PRN Blood Glucose LESS THAN 70 milliGRAM(s)/deciliter  glucagon  Injectable 1 milliGRAM(s) IntraMuscular once PRN Glucose LESS THAN 70 milligrams/deciliter  HYDROmorphone  Injectable 0.5 milliGRAM(s) IV Push every 4 hours PRN Moderate Pain (4 - 6)      Allergies    No Known Drug Allergies  Purell (Rash)    Intolerances        REVIEW OF SYSTEMS:  CONSTITUTIONAL: No fever or chills  HEENT:  No headache, no sore throat  RESPIRATORY: No cough, wheezing, or shortness of breath  CARDIOVASCULAR: No chest pain, palpitations, or leg swelling  GASTROINTESTINAL: No abd pain, nausea, vomiting, or diarrhea  GENITOURINARY: No dysuria, frequency, or hematuria  NEUROLOGICAL: no focal weakness or dizziness  MUSCULOSKELETAL: no myalgias     Vital Signs Last 24 Hrs  T(C): 37.1 (30 Oct 2018 12:20), Max: 37.7 (30 Oct 2018 04:18)  T(F): 98.8 (30 Oct 2018 12:20), Max: 99.8 (30 Oct 2018 04:18)  HR: 92 (30 Oct 2018 13:30) (77 - 112)  BP: 107/58 (30 Oct 2018 13:00) (70/42 - 200/86)  BP(mean): 79 (30 Oct 2018 13:00) (50 - 130)  RR: 39 (30 Oct 2018 13:30) (12 - 46)  SpO2: 97% (30 Oct 2018 13:30) (91% - 99%)    PHYSICAL EXAM:  GENERAL: NAD  HEENT:  EOMI, moist mucous membranes  CHEST/LUNG:  CTA b/l, no rales, wheezes, or rhonchi  HEART:  RRR, S1, S2  ABDOMEN:  BS+, soft, nontender, nondistended  EXTREMITIES: no edema, cyanosis, or calf tenderness  NERVOUS SYSTEM: AA&Ox3    LABS:                        11.1   24.63 )-----------( 158      ( 30 Oct 2018 09:04 )             36.4     CBC Full  -  ( 30 Oct 2018 09:04 )  WBC Count : 24.63 K/uL  Hemoglobin : 11.1 g/dL  Hematocrit : 36.4 %  Platelet Count - Automated : 158 K/uL  Mean Cell Volume : 89.2 fl  Mean Cell Hemoglobin : 27.2 pg  Mean Cell Hemoglobin Concentration : 30.5 gm/dL  Auto Neutrophil # : 20.94 K/uL  Auto Lymphocyte # : 2.46 K/uL  Auto Monocyte # : 0.25 K/uL  Auto Eosinophil # : 0.00 K/uL  Auto Basophil # : 0.00 K/uL  Auto Neutrophil % : 48.0 %  Auto Lymphocyte % : 10.0 %  Auto Monocyte % : 1.0 %  Auto Eosinophil % : 0.0 %  Auto Basophil % : 0.0 %    30 Oct 2018 07:37    140    |  106    |  57     ----------------------------<  144    4.4     |  23     |  5.90     Ca    7.9        30 Oct 2018 07:37  Phos  4.4       30 Oct 2018 07:37  Mg     1.9       30 Oct 2018 07:37    TPro  6.1    /  Alb  1.9    /  TBili  3.9    /  DBili  3.40   /  AST  279    /  ALT  158    /  AlkPhos  319    30 Oct 2018 07:37    PT/INR - ( 30 Oct 2018 05:49 )   PT: 22.7 sec;   INR: 2.05 ratio         PTT - ( 30 Oct 2018 05:49 )  PTT:34.1 sec  Urinalysis Basic - ( 29 Oct 2018 15:30 )    Color: Yellow / Appearance: Clear / S.005 / pH: x  Gluc: x / Ketone: Negative  / Bili: Small / Urobili: Negative   Blood: x / Protein: 500 mg/dL / Nitrite: Negative   Leuk Esterase: Negative / RBC: 3-5 /HPF / WBC 0-2   Sq Epi: x / Non Sq Epi: Few / Bacteria: Few      CAPILLARY BLOOD GLUCOSE      POCT Blood Glucose.: 189 mg/dL (30 Oct 2018 12:47)  POCT Blood Glucose.: 130 mg/dL (30 Oct 2018 05:51)  POCT Blood Glucose.: 304 mg/dL (29 Oct 2018 21:50)  POCT Blood Glucose.: 313 mg/dL (29 Oct 2018 19:28)        Culture - Blood (collected 10-29-18 @ 19:05)  Source: .Blood Blood-Peripheral  Gram Stain (10-30-18 @ 03:51):    Growth in aerobic and anaerobic bottles: Gram Negative Rods  Preliminary Report (10-30-18 @ 03:51):    Growth in aerobic and anaerobic bottles: Gram Negative Rods    Culture - Blood (collected 10-29-18 @ 19:03)  Source: .Blood Blood-Peripheral  Gram Stain (10-30-18 @ 03:49):    Growth in aerobic and anaerobic bottles: Gram Negative Rods  Preliminary Report (10-30-18 @ 03:49):    Growth in aerobic and anaerobic bottles: Gram Negative Rods    "Due to technical problems, Proteus sp. will Not be reported as part of    the BCID panel until further notice"    ***Blood Panel PCR results on this specimen are available    approximately 3 hours after the Gram stain result.***    Gram stain, PCR, and/or culture results may not always    correspond due to difference in methodologies.    ************************************************************    This PCR assay was performed using [employer].    The following targets are tested for: Enterococcus,    vancomycin resistant enterococci, Listeria monocytogenes,    coagulase negative staphylococci, S. aureus,    methicillin resistant S. aureus, Streptococcus agalactiae    (Group B), S. pneumoniae, S. pyogenes (Group A),    Acinetobacter baumannii, Enterobacter cloacae, E. coli,    Klebsiella oxytoca, K. pneumoniae, Proteus sp.,    Serratia marcescens, Haemophilus influenzae,    Neisseria meningitidis, Pseudomonas aeruginosa, Candida    albicans, C. glabrata, C krusei, C parapsilosis,    C. tropicalis and the KPC resistance gene.  Organism: Blood Culture PCR (10-30-18 @ 05:12)  Organism: Blood Culture PCR (10-30-18 @ 05:12)      -  Escherichia coli: Detec      Method Type: PCR        RADIOLOGY & ADDITIONAL TESTS:    Personally reviewed.     Consultant(s) Notes Reviewed:  [x] YES  [ ] NO    Care Discussed with [x] Consultants  [x] Patient  [ ] Family  [ ]      [ ] Other; RN  DVT ppx Patient is a 68y old  Female who presents with a chief complaint of Sepsis (30 Oct 2018 13:42)      INTERVAL HPI/OVERNIGHT EVENTS: She had A-fib with RVR, resolved with IV amiodarone and dig.  Pt seen and examined bedside, has no complaints. pt is alert and awake with family bed side. pt feels better and denies any symptoms.    MEDICATIONS  (STANDING):  aspirin enteric coated 81 milliGRAM(s) Oral daily  calcium carbonate 1250 mG  + Vitamin D (OsCal 500 + D) 1 Tablet(s) Oral daily  chlorhexidine 2% Cloths 1 Application(s) Topical daily  chlorhexidine 2% Cloths 1 Application(s) Topical daily  dextrose 5%. 1000 milliLiter(s) (50 mL/Hr) IV Continuous <Continuous>  dextrose 50% Injectable 12.5 Gram(s) IV Push once  dextrose 50% Injectable 25 Gram(s) IV Push once  dextrose 50% Injectable 25 Gram(s) IV Push once  ferrous    sulfate 325 milliGRAM(s) Oral three times a day  heparin  Injectable 5000 Unit(s) SubCutaneous every 8 hours  insulin lispro (HumaLOG) corrective regimen sliding scale   SubCutaneous every 6 hours  metoclopramide Injectable 10 milliGRAM(s) IV Push every 8 hours  metoprolol tartrate 50 milliGRAM(s) Oral two times a day  pantoprazole  Injectable 40 milliGRAM(s) IV Push daily  piperacillin/tazobactam IVPB. 3.375 Gram(s) IV Intermittent every 12 hours  ursodiol Capsule 300 milliGRAM(s) Oral every 12 hours    MEDICATIONS  (PRN):  acetaminophen   Tablet .. 650 milliGRAM(s) Oral every 6 hours PRN Mild Pain (1 - 3)  dextrose 40% Gel 15 Gram(s) Oral once PRN Blood Glucose LESS THAN 70 milliGRAM(s)/deciliter  glucagon  Injectable 1 milliGRAM(s) IntraMuscular once PRN Glucose LESS THAN 70 milligrams/deciliter  HYDROmorphone  Injectable 0.25 milliGRAM(s) IV Push every 4 hours PRN Moderate Pain (4 - 6)  HYDROmorphone  Injectable 0.5 milliGRAM(s) IV Push every 4 hours PRN Severe Pain (7 - 10)      Allergies    No Known Drug Allergies  Purell (Rash)    Intolerances        REVIEW OF SYSTEMS:  Limited 2/2 dementia.    ICU Vital Signs Last 24 Hrs  T(C): 36.7 (31 Oct 2018 15:50), Max: 37.5 (31 Oct 2018 00:09)  T(F): 98 (31 Oct 2018 15:50), Max: 99.5 (31 Oct 2018 00:09)  HR: 78 (31 Oct 2018 14:00) (75 - 147)  BP: 127/60 (31 Oct 2018 14:00) (100/62 - 146/63)  BP(mean): 86 (31 Oct 2018 14:00) (71 - 103)  ABP: --  ABP(mean): --  RR: 12 (31 Oct 2018 14:00) (8 - 31)  SpO2: 99% (31 Oct 2018 14:00) (82% - 100%)    PHYSICAL EXAM:  GENERAL: NAD. on O2 nc  HEENT:  EOMI, moist mucous membranes  CHEST/LUNG:  decreased bs b/l, no rales, wheezes, or rhonchi  HEART:  RRR, S1, S2  ABDOMEN:  BS+, soft, nontender, nondistended, with drain having serosanguinous and dark fluid  EXTREMITIES: no edema, cyanosis, or calf tenderness  NERVOUS SYSTEM: AA&Ox3    LABS:                        10.3   15.40 )-----------( 122      ( 31 Oct 2018 07:08 )             34.3     31 Oct 2018 07:08    137    |  101    |  32     ----------------------------<  130    4.3     |  28     |  4.10     Ca    8.2        31 Oct 2018 07:08  Phos  4.0       31 Oct 2018 07:08  Mg     2.0       31 Oct 2018 07:08    TPro  6.8    /  Alb  2.1    /  TBili  4.3    /  DBili  x      /  AST  272    /  ALT  208    /  AlkPhos  262    31 Oct 2018 07:08    PT/INR - ( 31 Oct 2018 07:08 )   PT: 18.7 sec;   INR: 1.62 ratio         PTT - ( 30 Oct 2018 05:49 )  PTT:34.1 sec    CAPILLARY BLOOD GLUCOSE      POCT Blood Glucose.: 155 mg/dL (31 Oct 2018 11:45)  POCT Blood Glucose.: 131 mg/dL (31 Oct 2018 05:47)  POCT Blood Glucose.: 117 mg/dL (31 Oct 2018 00:04)  POCT Blood Glucose.: 111 mg/dL (30 Oct 2018 18:29)    UCx       RADIOLOGY & ADDITIONAL TESTS:      Culture - Blood (collected 10-29-18 @ 19:05)  Source: .Blood Blood-Peripheral  Gram Stain (10-30-18 @ 03:51):    Growth in aerobic and anaerobic bottles: Gram Negative Rods  Preliminary Report (10-30-18 @ 03:51):    Growth in aerobic and anaerobic bottles: Gram Negative Rods    Culture - Blood (collected 10-29-18 @ 19:03)  Source: .Blood Blood-Peripheral  Gram Stain (10-30-18 @ 03:49):    Growth in aerobic and anaerobic bottles: Gram Negative Rods  Preliminary Report (10-30-18 @ 03:49):    Growth in aerobic and anaerobic bottles: Gram Negative Rods    "Due to technical problems, Proteus sp. will Not be reported as part of    the BCID panel until further notice"    ***Blood Panel PCR results on this specimen are available    approximately 3 hours after the Gram stain result.***    Gram stain, PCR, and/or culture results may not always    correspond due to difference in methodologies.    ************************************************************    This PCR assay was performed using [employer].    The following targets are tested for: Enterococcus,    vancomycin resistant enterococci, Listeria monocytogenes,    coagulase negative staphylococci, S. aureus,    methicillin resistant S. aureus, Streptococcus agalactiae    (Group B), S. pneumoniae, S. pyogenes (Group A),    Acinetobacter baumannii, Enterobacter cloacae, E. coli,    Klebsiella oxytoca, K. pneumoniae, Proteus sp.,    Serratia marcescens, Haemophilus influenzae,    Neisseria meningitidis, Pseudomonas aeruginosa, Candida    albicans, C. glabrata, C krusei, C parapsilosis,    C. tropicalis and the KPC resistance gene.  Organism: Blood Culture PCR (10-30-18 @ 05:12)  Organism: Blood Culture PCR (10-30-18 @ 05:12)      -  Escherichia coli: Detec      Method Type: PCR        RADIOLOGY & ADDITIONAL TESTS:    Personally reviewed.     Consultant(s) Notes Reviewed:  [x] YES  [ ] NO    Care Discussed with [x] Consultants  [x] Patient  [ ] Family  [ ]      [ ] Other; RN  DVT ppx

## 2018-10-31 NOTE — CONSULT NOTE ADULT - ATTENDING COMMENTS
The patient was personally seen and examined, in addition to being examined and evaluated by housestaff.  All elements of the note were edited where appropriate.
Patient seen and evaluated independently from above.     This is a 68-year-old female with a history of HTN, HLD, DM2, ESRD on dialysis, CAD s/p PCI (2007), and anemia of chronic kidney disease who presents with 1 day of fevers, nausea, vomiting, and abdominal pain. In the ED, she was found to be febrile to 103, initially hypertensive with SBP 170s. She was found to have leukocytosis with bandemia, lactic acidosis, transminitis, elevated alk phos 300 with Tbili 4.2. CT a/p with distended gallbladder with calcified stones, pericholecystic edema, and gallbladder wall thickening consistent with acute cholecystitis. Common duct dilated up to 1.5 cm with questionable distal common duct stone. MRCP showed acute cholecystitis with a 2.5 cm stone at the gallbladder neck and 13 mm common bile duct without evidence for choledocholithiasis.    Presentation consistent with acute cholecystitis vs. ascending cholangitis. Has obstructing stone in gallbladder neck, also appears to have recently passed CBD stone with elevated alk phos and Tbili. Also with leukocytosis, bandemia, and lactic acidosis. She received 2 liters NS in ED, but then repeat BP 96/40 consistent with severe sepsis and impending shock    - Admit to ICU for hemodynamic monitoring  - Not in significant pain requiring medications at this time  - Give additional liter LR given severe sepsis. Will avoid maintenance IVF given that patient is on dialysis. May require pressors if develops worsening hypotension. Hold aspirin, plavix, imdur, nifedipine, and crestor at this time. Continue metoprolol  - Stable respiratory status at present  - Keep NPO overnight. F/up GI re: possible ERCP. F/up surgery re: lap kevin vs. perc kevin drain. May need to contact IR in AM. Trend LFTs, WBC/bands, alk phos & Tbili. Check amylase and lipase in AM. Trend lactic acidosis  - ESRD on dialysis, discussed with renal, plan for dialysis tomorrow  - Continue with Zosyn for acute kevin/ascending cholangitis. F/up cultures  - DVT ppx with HSQ  - DM2, f/up A1C, give half-dose lantus 12 units qhs while patient NPO, insulin coverage scale  - No lines or aguilar  - Discussed with patient and daughter at bedside, full code  CC time spent: 35 min
D/W Dr. Mandy Solis MD  [phone]

## 2018-10-31 NOTE — PROGRESS NOTE ADULT - SUBJECTIVE AND OBJECTIVE BOX
pt seen  feeling slightly better  perc drained yesterday  ICU Vital Signs Last 24 Hrs  T(C): 36.9 (31 Oct 2018 08:42), Max: 37.5 (31 Oct 2018 00:09)  T(F): 98.4 (31 Oct 2018 08:42), Max: 99.5 (31 Oct 2018 00:09)  HR: 78 (31 Oct 2018 10:00) (77 - 993)  BP: 127/60 (31 Oct 2018 10:00) (100/62 - 146/62)  BP(mean): 86 (31 Oct 2018 10:00) (71 - 103)  ABP: --  ABP(mean): --  RR: 13 (31 Oct 2018 10:00) (8 - 39)  SpO2: 99% (31 Oct 2018 10:00) (82% - 100%)  gen-NAD  resp-clear   abd, soft pain slightly less                        10.3   15.40 )-----------( 122      ( 31 Oct 2018 07:08 )             34.3   10-31    137  |  101  |  32<H>  ----------------------------<  130<H>  4.3   |  28  |  4.10<H>    Ca    8.2<L>      31 Oct 2018 07:08  Phos  4.0     10-31  Mg     2.0     10-31    TPro  6.8  /  Alb  2.1<L>  /  TBili  4.3<H>  /  DBili  x   /  AST  272<H>  /  ALT  208<H>  /  AlkPhos  262<H>  10-31    I&O's Detail    30 Oct 2018 07:01  -  31 Oct 2018 07:00  --------------------------------------------------------  IN:    norepinephrine Infusion: 122.8 mL    Oral Fluid: 60 mL    phenylephrine   Infusion: 112.4 mL    Plasma: 309 mL    Solution: 100 mL    Solution: 100 mL  Total IN: 804.2 mL    OUT:    Drain: 250 mL    Other: 2000 mL  Total OUT: 2250 mL    Total NET: -1445.8 mL      31 Oct 2018 07:01  -  31 Oct 2018 10:50  --------------------------------------------------------  IN:    Solution: 100 mL  Total IN: 100 mL    OUT:  Total OUT: 0 mL    Total NET: 100 mL

## 2018-10-31 NOTE — PROGRESS NOTE ADULT - ASSESSMENT
68y old female pmhx CAD s/p stent (2007), ESRD on HD (MWF; missed today's session), DM, HTN, HLD admitted with acute cholecystitis, septic shock, acute respiratory failure, now POD#1 from cholecystostomy tube placement.      NEURO: Pain control as needed.    CV: Septic shock in setting on Gm negative bacteremia due to acute cholecystitis.  On vasopressor, titrate phenylephrine for MAP >65, wean as tolerated.  Received fluid resuscitation, limited by ESRD.  Zosyn for coverage.  Lactate cleared.  Monitor blood cultures.  Afib with rvr, lopressor iv prn, amio load received and digoxin .5mg IV x1 given.  Rate improved, monitor rate.   RESP: Acute respiratory failure, on BiPAP.  Titrate FiO2 for sPO2 >92%.  Keep HOB >30 degrees.  Wean as tolerated by patient.  Aspiration precautions.    RENAL: ESRD on HD, received HD today, 2L Fluid taken off.    GI: NPO except for meds, sips/chips.    ENDO: DM on ISS.  Lantus discontinued this evening due to NPO status and serum glucose within normal limits, can restart when PO intake improves.    ID: Zosyn for septic shock due to acute cholecystitis and Gm negative bacteremia.    HEME: No active issues, heparin for chemical VTE ppx.   DISPO: Full Code     Critical Care time: 55 mins assessing presenting problems of acute illness that poses high probability of life threatening deterioration or end organ damage/dysfunction.  Medical decision making inculding Initiating plan of care, reviewing data, reviewing radiology, discussing with multidisciplinary team, non inclusive of procedures, discussing goals of care with patient/family

## 2018-10-31 NOTE — PROGRESS NOTE ADULT - ASSESSMENT
·	ESRD on HD  ·	Cholecystitis, s/p Perc cholecystostomy  ·	Diabetes  ·	Hypertension    To continue HD TIW as scheduled. Fluid removal as tolerated by BP.   Dietary and PO fluid restriction. Epogen as needed for anemia. On IV abx.  Monitor BP trend. Titrate BP meds as needed. Salt restriction.   Monitor blood sugar levels. Insulin coverage as needed. Surgery follow up.

## 2018-10-31 NOTE — CONSULT NOTE ADULT - CONSULT REQUESTED DATE/TIME
29-Oct-2018 15:30
29-Oct-2018 18:22
29-Oct-2018 20:14
30-Oct-2018 12:19
31-Oct-2018 11:54
29-Oct-2018 17:10

## 2018-10-31 NOTE — PROGRESS NOTE ADULT - PROBLEM SELECTOR PLAN 1
s/p perc c-tube  npo/ivf  cont abx  reglan for n/v  cont ppi  cont mily  monitor drain output  bld cxs w gnr  f/u surgery recs  id following  serial lfts  monitor abd exam  pain control  lft trend noted, ?ercp  further plan of care to be dw attg

## 2018-10-31 NOTE — CONSULT NOTE ADULT - PROBLEM SELECTOR RECOMMENDATION 9
npo  ivf  pain management  may need ercp mrcp to be done  in and out    serial labs to be done  ppi once a day
Drainage the essential element  Current antibiotics adequate  May not need to treat bile isolates specifically

## 2018-10-31 NOTE — CONSULT NOTE ADULT - SUBJECTIVE AND OBJECTIVE BOX
[facility], Division of Infectious Diseases  DEB Barbosa A. Lee    CLAUDIA, IRENE  68y, Female  [id number]    Seen earlier this am.    HPI--  68F limited historian due to lethargy and limied english proficiecny,, who was admitted 10/29 with abdominal pain and found to have acute cholecystitis with concern of CBD obstruction. Patient underwent percutaneous cholecystotomy. 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 LFT somewhat improved, though bilirubin remains elevated. Patient complains of abdominal pain but denies any N/V. She has E. coli in her blood cultures (d/w micro lab, no sensi available as of yet. She is receiving pip-tazo dosed for her dialysis status.    PMH/PSH--  ESRD (end stage renal disease) on dialysis  CAD (coronary artery disease)  Diabetes  CRF (chronic renal failure)  Hypertension  Pneumonia  Myocardial infarction  Hypertension  Diabetes  H/O: hysterectomy      Allergies--NKDA      Medications--  Antibiotics: piperacillin/tazobactam IVPB. 3.375 Gram(s) IV Intermittent every 12 hours    Immunologic:   Other: acetaminophen   Tablet .. PRN  aspirin enteric coated  calcium carbonate 1250 mG  + Vitamin D (OsCal 500 + D)  chlorhexidine 2% Cloths  chlorhexidine 2% Cloths  dextrose 40% Gel PRN  dextrose 5%.  dextrose 50% Injectable  dextrose 50% Injectable  dextrose 50% Injectable  ferrous    sulfate  glucagon  Injectable PRN  heparin  Injectable  HYDROmorphone  Injectable PRN  HYDROmorphone  Injectable PRN  insulin lispro (HumaLOG) corrective regimen sliding scale  metoclopramide Injectable  metoprolol tartrate  pantoprazole  Injectable  ursodiol Capsule      Social History--  EtOH: denies   Tobacco: denies  Drug Use: denies     Family/Marital History--  unable      Review of Systems:  Review of systems unable secondary to clinical condition.     Physical Exam--  Vital Signs: T(F): 98.4 (10-31-18 @ 08:42), Max: 99.5 (10-31-18 @ 00:09)  HR: 78 (10-31-18 @ 10:00)  BP: 127/60 (10-31-18 @ 10:00)  RR: 13 (10-31-18 @ 10:00)  SpO2: 99% (10-31-18 @ 10:00)  Wt(kg): --  General: Nontoxic-appearing Female in no acute distress.  HEENT: AT/NC. PERRL. EOMI. +/- icterus. Conjunctiva pink and moist. Oropharynx clear.   Neck: Not rigid. No sense of mass.  Nodes: None palpable.  Chest: Tunneled R ACW catheter without any exit site discharge or erythema. Tunnel pathway not inflamed appearing.  Lungs: Diminished breath sounds bilaterally without rales, wheezing or rhonchi  Heart: Regular rate and rhythm. No Murmur. No rub. No gallop. No palpable thrill.  Abdomen: Bowel sounds present and normoactive. Soft. Mildly distended with increased resonance. Mild diffuse tenderness. No sense of mass. No organomegaly. Drain purulent looking bile.   Extremities: No cyanosis or clubbing. Trace edema.   Skin: Warm. Dry. Good turgor. No rash. No vasculitic stigmata.  Psychiatric: Largely unable    Laboratory & Imaging Data--  CBC                        10.3   15.40 )-----------( 122      ( 31 Oct 2018 07:08 )             34.3   8)  WBC Count: 24.63 K/uL (10-30-18 @ 09:04)  WBC Count: 13.58 K/uL (10-30-18 @ 05:49)  WBC Count: 23.96 K/uL (10-29-18 @ 15:30)      Chemistries  10-31    137  |  101  |  32<H>  ----------------------------<  130<H>  4.3   |  28  |  4.10<H>    Ca    8.2<L>      31 Oct 2018 07:08  Phos  4.0     10-31  Mg     2.0     10-31    TPro  6.8  /  Alb  2.1<L>  /  TBili  4.3<H>  /  DBili  x   /  AST  272<H>  /  ALT  208<H>  /  AlkPhos  262<H>  10-31      Culture Data    Culture - Body Fluid with Gram Stain (collected 30 Oct 2018 21:29)  Source: Bile Bile Fluid  Gram Stain (31 Oct 2018 01:08):    No polymorphonuclear leukocytes seen per low power field    Numerous Gram Negative Rods seen per oil power field    Culture - Blood (collected 30 Oct 2018 10:47)  Source: .Blood Blood-Venous  Gram Stain (31 Oct 2018 10:07):    Growth in aerobic bottle: Gram Negative Rods  Preliminary Report (31 Oct 2018 10:07):    Growth in aerobic bottle: Gram Negative Rods    Culture - Blood (collected 30 Oct 2018 10:47)  Source: .Blood Blood-Peripheral  Preliminary Report (31 Oct 2018 11:02):    No growth to date.    Culture - Urine (collected 29 Oct 2018 19:10)  Source: .Urine Clean Catch (Midstream)  Final Report (30 Oct 2018 22:08):    No growth    Culture - Blood (collected 29 Oct 2018 19:05)  Source: .Blood Blood-Peripheral  Gram Stain (30 Oct 2018 03:51):    Growth in aerobic and anaerobic bottles: Gram Negative Rods  Preliminary Report (31 Oct 2018 08:16):    Growth in aerobic and anaerobic bottles: Escherichia coli    See previous culture [id number]    Culture - Blood (collected 29 Oct 2018 19:03)  Source: .Blood Blood-Peripheral  Gram Stain (30 Oct 2018 03:49):    Growth in aerobic and anaerobic bottles: Gram Negative Rods  Preliminary Report (31 Oct 2018 08:17):    Growth in aerobic and anaerobic bottles: Escherichia coli    "Due to technical problems, Proteus sp. will Not be reported as part of    the BCID panel until further notice"    ***Blood Panel PCR results on this specimen are available    approximately 3 hours after the Gram stain result.***    Gram stain, PCR, and/or culture results may not always    correspond due to difference in methodologies.    ************************************************************    This PCR assay was performed using [employer].    The following targets are tested for: Enterococcus,    vancomycin resistant enterococci, Listeria monocytogenes,    coagulase negative staphylococci, S. aureus,    methicillin resistant S. aureus, Streptococcus agalactiae    (Group B), S. pneumoniae, S.pyogenes (Group A),    Acinetobacter baumannii, Enterobacter cloacae, E. coli,    Klebsiella oxytoca, K. pneumoniae, Proteus sp.,    Serratia marcescens, Haemophilus influenzae,    Neisseria meningitidis, Pseudomonas aeruginosa, Candida    albicans, C. glabrata, C krusei, C parapsilosis,    C. tropicalis and the KPC resistance gene.  Organism: Blood Culture PCR (30 Oct 2018 05:12)  Organism: Blood Culture PCR (30 Oct 2018 05:12)    < from: CT Abdomen and Pelvis No Cont (10.29.18 @ 16:12) >  EXAM:  CT ABDOMEN AND PELVIS                        PROCEDURE DATE:  10/29/2018    INTERPRETATION:  History: Fever weakness hypoxia.  CT chest abdomen and pelvis, no contrast.  Limited by lack of any oral or intravenous contrast.  Streaky atelectatic changes left base. No lobar consolidation or pleural   effusion.  Central airways patent. No mediastinal adenopathy. Normal caliber   thoracic aorta.  Mild cardiomegaly with coronary artery calcification. Gallbladder   markedly distended with calcified stones. Pericholecystic edema noted.   Findings are suggestive of acute cholecystitis in appropriate clinical   setting. Common duct dilated up to 1.5 cm, there is questionable distal   common duct stone. Correlate with MRCP. Unenhanced liver pancreas spleen   not remarkable.  Left renal cyst.  Abdominal aorta nonaneurysmal.  No suspicious adenopathy.  No evidence appendicitis obstructed or actively inflamed bowel. No   extraluminal fluid or gas.  Status post hysterectomy. Bladder not adequately distended.  Nonspecific subcutaneous induration lower anterior abdominal wall may   reflect scarring or edema/cellulitis.Correlate clinically.  Age-indeterminate T12 and L2 compression deformities.    Impression:  No intrathoracic source for fever.  Constellation of findings suggestive of acute calculus cholecystitis in   the appropriate setting. Biliary dilatation with a questionable common   duct stone. Correlate with MRCP.  Additional findings as discussed.  QUIN POTTS M.D., ATTENDING RADIOLOGIST  This document has been electronically signed. Oct 29 2018  4:29PM  < end of copied text >    < from: MR MRCP No Cont (10.29.18 @ 18:52) >  IMPRESSION:  Acute cholecystitis with a 2.5 cm stone at the gallbladder neck.    13 mm common bile duct without evidence for choledocholithiasis.  < end of copied text >

## 2018-11-01 LAB
-  AMIKACIN: SIGNIFICANT CHANGE UP
-  AMIKACIN: SIGNIFICANT CHANGE UP
-  AMOXICILLIN/CLAVULANIC ACID: SIGNIFICANT CHANGE UP
-  AMPICILLIN/SULBACTAM: SIGNIFICANT CHANGE UP
-  AMPICILLIN/SULBACTAM: SIGNIFICANT CHANGE UP
-  AMPICILLIN: SIGNIFICANT CHANGE UP
-  AMPICILLIN: SIGNIFICANT CHANGE UP
-  AZTREONAM: SIGNIFICANT CHANGE UP
-  AZTREONAM: SIGNIFICANT CHANGE UP
-  CEFAZOLIN: SIGNIFICANT CHANGE UP
-  CEFAZOLIN: SIGNIFICANT CHANGE UP
-  CEFEPIME: SIGNIFICANT CHANGE UP
-  CEFEPIME: SIGNIFICANT CHANGE UP
-  CEFOXITIN: SIGNIFICANT CHANGE UP
-  CEFOXITIN: SIGNIFICANT CHANGE UP
-  CEFTRIAXONE: SIGNIFICANT CHANGE UP
-  CEFTRIAXONE: SIGNIFICANT CHANGE UP
-  CIPROFLOXACIN: SIGNIFICANT CHANGE UP
-  CIPROFLOXACIN: SIGNIFICANT CHANGE UP
-  ERTAPENEM: SIGNIFICANT CHANGE UP
-  ERTAPENEM: SIGNIFICANT CHANGE UP
-  GENTAMICIN: SIGNIFICANT CHANGE UP
-  GENTAMICIN: SIGNIFICANT CHANGE UP
-  IMIPENEM: SIGNIFICANT CHANGE UP
-  IMIPENEM: SIGNIFICANT CHANGE UP
-  LEVOFLOXACIN: SIGNIFICANT CHANGE UP
-  LEVOFLOXACIN: SIGNIFICANT CHANGE UP
-  MEROPENEM: SIGNIFICANT CHANGE UP
-  MEROPENEM: SIGNIFICANT CHANGE UP
-  PIPERACILLIN/TAZOBACTAM: SIGNIFICANT CHANGE UP
-  PIPERACILLIN/TAZOBACTAM: SIGNIFICANT CHANGE UP
-  TOBRAMYCIN: SIGNIFICANT CHANGE UP
-  TOBRAMYCIN: SIGNIFICANT CHANGE UP
-  TRIMETHOPRIM/SULFAMETHOXAZOLE: SIGNIFICANT CHANGE UP
-  TRIMETHOPRIM/SULFAMETHOXAZOLE: SIGNIFICANT CHANGE UP
ALBUMIN SERPL ELPH-MCNC: 2 G/DL — LOW (ref 3.3–5)
ALP SERPL-CCNC: 317 U/L — HIGH (ref 40–120)
ALT FLD-CCNC: 159 U/L — HIGH (ref 12–78)
ANION GAP SERPL CALC-SCNC: 10 MMOL/L — SIGNIFICANT CHANGE UP (ref 5–17)
APTT BLD: 32.4 SEC — SIGNIFICANT CHANGE UP (ref 27.5–36.3)
AST SERPL-CCNC: 118 U/L — HIGH (ref 15–37)
BASOPHILS # BLD AUTO: 0.02 K/UL — SIGNIFICANT CHANGE UP (ref 0–0.2)
BASOPHILS NFR BLD AUTO: 0.1 % — SIGNIFICANT CHANGE UP (ref 0–2)
BILIRUB SERPL-MCNC: 3.9 MG/DL — HIGH (ref 0.2–1.2)
BUN SERPL-MCNC: 52 MG/DL — HIGH (ref 7–23)
CALCIUM SERPL-MCNC: 8.3 MG/DL — LOW (ref 8.5–10.1)
CHLORIDE SERPL-SCNC: 100 MMOL/L — SIGNIFICANT CHANGE UP (ref 96–108)
CK MB BLD-MCNC: 2.6 % — SIGNIFICANT CHANGE UP (ref 0–3.5)
CK MB CFR SERPL CALC: 1.3 NG/ML — SIGNIFICANT CHANGE UP (ref 0–3.6)
CK SERPL-CCNC: 50 U/L — SIGNIFICANT CHANGE UP (ref 26–192)
CO2 SERPL-SCNC: 28 MMOL/L — SIGNIFICANT CHANGE UP (ref 22–31)
CREAT SERPL-MCNC: 5.4 MG/DL — HIGH (ref 0.5–1.3)
CULTURE RESULTS: SIGNIFICANT CHANGE UP
CULTURE RESULTS: SIGNIFICANT CHANGE UP
EOSINOPHIL # BLD AUTO: 0.1 K/UL — SIGNIFICANT CHANGE UP (ref 0–0.5)
EOSINOPHIL NFR BLD AUTO: 0.6 % — SIGNIFICANT CHANGE UP (ref 0–6)
GLUCOSE SERPL-MCNC: 137 MG/DL — HIGH (ref 70–99)
GRAM STN FLD: SIGNIFICANT CHANGE UP
GRAM STN FLD: SIGNIFICANT CHANGE UP
HCT VFR BLD CALC: 35.2 % — SIGNIFICANT CHANGE UP (ref 34.5–45)
HGB BLD-MCNC: 10.2 G/DL — LOW (ref 11.5–15.5)
IMM GRANULOCYTES NFR BLD AUTO: 1.2 % — SIGNIFICANT CHANGE UP (ref 0–1.5)
INR BLD: 1.28 RATIO — HIGH (ref 0.88–1.16)
LYMPHOCYTES # BLD AUTO: 1.41 K/UL — SIGNIFICANT CHANGE UP (ref 1–3.3)
LYMPHOCYTES # BLD AUTO: 9.1 % — LOW (ref 13–44)
MAGNESIUM SERPL-MCNC: 2.3 MG/DL — SIGNIFICANT CHANGE UP (ref 1.6–2.6)
MCHC RBC-ENTMCNC: 26.7 PG — LOW (ref 27–34)
MCHC RBC-ENTMCNC: 29 GM/DL — LOW (ref 32–36)
MCV RBC AUTO: 92.1 FL — SIGNIFICANT CHANGE UP (ref 80–100)
METHOD TYPE: SIGNIFICANT CHANGE UP
METHOD TYPE: SIGNIFICANT CHANGE UP
MONOCYTES # BLD AUTO: 0.82 K/UL — SIGNIFICANT CHANGE UP (ref 0–0.9)
MONOCYTES NFR BLD AUTO: 5.3 % — SIGNIFICANT CHANGE UP (ref 2–14)
NEUTROPHILS # BLD AUTO: 13.05 K/UL — HIGH (ref 1.8–7.4)
NEUTROPHILS NFR BLD AUTO: 83.7 % — HIGH (ref 43–77)
ORGANISM # SPEC MICROSCOPIC CNT: SIGNIFICANT CHANGE UP
PHOSPHATE SERPL-MCNC: 5.6 MG/DL — HIGH (ref 2.5–4.5)
PLATELET # BLD AUTO: 147 K/UL — LOW (ref 150–400)
POTASSIUM SERPL-MCNC: 4.7 MMOL/L — SIGNIFICANT CHANGE UP (ref 3.5–5.3)
POTASSIUM SERPL-SCNC: 4.7 MMOL/L — SIGNIFICANT CHANGE UP (ref 3.5–5.3)
PROT SERPL-MCNC: 7.1 G/DL — SIGNIFICANT CHANGE UP (ref 6–8.3)
PROTHROM AB SERPL-ACNC: 14.6 SEC — HIGH (ref 10–12.9)
RBC # BLD: 3.82 M/UL — SIGNIFICANT CHANGE UP (ref 3.8–5.2)
RBC # FLD: 17 % — HIGH (ref 10.3–14.5)
SODIUM SERPL-SCNC: 138 MMOL/L — SIGNIFICANT CHANGE UP (ref 135–145)
SPECIMEN SOURCE: SIGNIFICANT CHANGE UP
TROPONIN I SERPL-MCNC: 0.17 NG/ML — HIGH (ref 0.01–0.04)
WBC # BLD: 15.58 K/UL — HIGH (ref 3.8–10.5)
WBC # FLD AUTO: 15.58 K/UL — HIGH (ref 3.8–10.5)

## 2018-11-01 PROCEDURE — 99291 CRITICAL CARE FIRST HOUR: CPT

## 2018-11-01 PROCEDURE — 93010 ELECTROCARDIOGRAM REPORT: CPT

## 2018-11-01 PROCEDURE — 93970 EXTREMITY STUDY: CPT | Mod: 26

## 2018-11-01 PROCEDURE — 99233 SBSQ HOSP IP/OBS HIGH 50: CPT

## 2018-11-01 PROCEDURE — 74018 RADEX ABDOMEN 1 VIEW: CPT | Mod: 26

## 2018-11-01 RX ORDER — ISOSORBIDE MONONITRATE 60 MG/1
30 TABLET, EXTENDED RELEASE ORAL EVERY 24 HOURS
Qty: 0 | Refills: 0 | Status: DISCONTINUED | OUTPATIENT
Start: 2018-11-01 | End: 2018-11-16

## 2018-11-01 RX ORDER — NIFEDIPINE 30 MG
60 TABLET, EXTENDED RELEASE 24 HR ORAL DAILY
Qty: 0 | Refills: 0 | Status: DISCONTINUED | OUTPATIENT
Start: 2018-11-01 | End: 2018-11-09

## 2018-11-01 RX ORDER — CLOPIDOGREL BISULFATE 75 MG/1
75 TABLET, FILM COATED ORAL DAILY
Qty: 0 | Refills: 0 | Status: DISCONTINUED | OUTPATIENT
Start: 2018-11-01 | End: 2018-11-16

## 2018-11-01 RX ORDER — POLYETHYLENE GLYCOL 3350 17 G/17G
17 POWDER, FOR SOLUTION ORAL ONCE
Qty: 0 | Refills: 0 | Status: COMPLETED | OUTPATIENT
Start: 2018-11-01 | End: 2018-11-01

## 2018-11-01 RX ORDER — DOCUSATE SODIUM 100 MG
100 CAPSULE ORAL THREE TIMES A DAY
Qty: 0 | Refills: 0 | Status: DISCONTINUED | OUTPATIENT
Start: 2018-11-01 | End: 2018-11-16

## 2018-11-01 RX ORDER — ERTAPENEM SODIUM 1 G/1
500 INJECTION, POWDER, LYOPHILIZED, FOR SOLUTION INTRAMUSCULAR; INTRAVENOUS EVERY 24 HOURS
Qty: 0 | Refills: 0 | Status: DISCONTINUED | OUTPATIENT
Start: 2018-11-02 | End: 2018-11-03

## 2018-11-01 RX ORDER — ACETAMINOPHEN 500 MG
1000 TABLET ORAL ONCE
Qty: 0 | Refills: 0 | Status: COMPLETED | OUTPATIENT
Start: 2018-11-01 | End: 2018-11-01

## 2018-11-01 RX ORDER — GABAPENTIN 400 MG/1
300 CAPSULE ORAL
Qty: 0 | Refills: 0 | Status: DISCONTINUED | OUTPATIENT
Start: 2018-11-01 | End: 2018-11-02

## 2018-11-01 RX ORDER — ALPRAZOLAM 0.25 MG
0.25 TABLET ORAL ONCE
Qty: 0 | Refills: 0 | Status: DISCONTINUED | OUTPATIENT
Start: 2018-11-01 | End: 2018-11-01

## 2018-11-01 RX ORDER — ERTAPENEM SODIUM 1 G/1
INJECTION, POWDER, LYOPHILIZED, FOR SOLUTION INTRAMUSCULAR; INTRAVENOUS
Qty: 0 | Refills: 0 | Status: DISCONTINUED | OUTPATIENT
Start: 2018-11-01 | End: 2018-11-03

## 2018-11-01 RX ORDER — ISOSORBIDE MONONITRATE 60 MG/1
60 TABLET, EXTENDED RELEASE ORAL EVERY 24 HOURS
Qty: 0 | Refills: 0 | Status: DISCONTINUED | OUTPATIENT
Start: 2018-11-02 | End: 2018-11-02

## 2018-11-01 RX ORDER — SENNA PLUS 8.6 MG/1
2 TABLET ORAL AT BEDTIME
Qty: 0 | Refills: 0 | Status: DISCONTINUED | OUTPATIENT
Start: 2018-11-01 | End: 2018-11-07

## 2018-11-01 RX ORDER — ERTAPENEM SODIUM 1 G/1
500 INJECTION, POWDER, LYOPHILIZED, FOR SOLUTION INTRAMUSCULAR; INTRAVENOUS ONCE
Qty: 0 | Refills: 0 | Status: COMPLETED | OUTPATIENT
Start: 2018-11-01 | End: 2018-11-01

## 2018-11-01 RX ADMIN — HEPARIN SODIUM 5000 UNIT(S): 5000 INJECTION INTRAVENOUS; SUBCUTANEOUS at 22:06

## 2018-11-01 RX ADMIN — HYDROMORPHONE HYDROCHLORIDE 0.25 MILLIGRAM(S): 2 INJECTION INTRAMUSCULAR; INTRAVENOUS; SUBCUTANEOUS at 01:41

## 2018-11-01 RX ADMIN — URSODIOL 300 MILLIGRAM(S): 250 TABLET, FILM COATED ORAL at 18:29

## 2018-11-01 RX ADMIN — GABAPENTIN 300 MILLIGRAM(S): 400 CAPSULE ORAL at 18:29

## 2018-11-01 RX ADMIN — SENNA PLUS 2 TABLET(S): 8.6 TABLET ORAL at 22:06

## 2018-11-01 RX ADMIN — Medication 10 MILLIGRAM(S): at 06:28

## 2018-11-01 RX ADMIN — HYDROMORPHONE HYDROCHLORIDE 0.5 MILLIGRAM(S): 2 INJECTION INTRAMUSCULAR; INTRAVENOUS; SUBCUTANEOUS at 23:15

## 2018-11-01 RX ADMIN — Medication 1 TABLET(S): at 11:44

## 2018-11-01 RX ADMIN — HYDROMORPHONE HYDROCHLORIDE 0.5 MILLIGRAM(S): 2 INJECTION INTRAMUSCULAR; INTRAVENOUS; SUBCUTANEOUS at 23:00

## 2018-11-01 RX ADMIN — Medication 1000 MILLIGRAM(S): at 11:48

## 2018-11-01 RX ADMIN — HYDROMORPHONE HYDROCHLORIDE 0.5 MILLIGRAM(S): 2 INJECTION INTRAMUSCULAR; INTRAVENOUS; SUBCUTANEOUS at 18:44

## 2018-11-01 RX ADMIN — HYDROMORPHONE HYDROCHLORIDE 0.5 MILLIGRAM(S): 2 INJECTION INTRAMUSCULAR; INTRAVENOUS; SUBCUTANEOUS at 18:29

## 2018-11-01 RX ADMIN — ISOSORBIDE MONONITRATE 30 MILLIGRAM(S): 60 TABLET, EXTENDED RELEASE ORAL at 18:29

## 2018-11-01 RX ADMIN — PANTOPRAZOLE SODIUM 40 MILLIGRAM(S): 20 TABLET, DELAYED RELEASE ORAL at 11:39

## 2018-11-01 RX ADMIN — Medication 0.25 MILLIGRAM(S): at 06:26

## 2018-11-01 RX ADMIN — HEPARIN SODIUM 5000 UNIT(S): 5000 INJECTION INTRAVENOUS; SUBCUTANEOUS at 05:11

## 2018-11-01 RX ADMIN — POLYETHYLENE GLYCOL 3350 17 GRAM(S): 17 POWDER, FOR SOLUTION ORAL at 11:39

## 2018-11-01 RX ADMIN — Medication 50 MILLIGRAM(S): at 05:11

## 2018-11-01 RX ADMIN — SIMETHICONE 80 MILLIGRAM(S): 80 TABLET, CHEWABLE ORAL at 11:39

## 2018-11-01 RX ADMIN — CLOPIDOGREL BISULFATE 75 MILLIGRAM(S): 75 TABLET, FILM COATED ORAL at 11:39

## 2018-11-01 RX ADMIN — Medication 10 MILLIGRAM(S): at 14:09

## 2018-11-01 RX ADMIN — HEPARIN SODIUM 5000 UNIT(S): 5000 INJECTION INTRAVENOUS; SUBCUTANEOUS at 14:10

## 2018-11-01 RX ADMIN — URSODIOL 300 MILLIGRAM(S): 250 TABLET, FILM COATED ORAL at 05:11

## 2018-11-01 RX ADMIN — Medication 400 MILLIGRAM(S): at 11:33

## 2018-11-01 RX ADMIN — Medication 325 MILLIGRAM(S): at 14:09

## 2018-11-01 RX ADMIN — ERTAPENEM SODIUM 100 MILLIGRAM(S): 1 INJECTION, POWDER, LYOPHILIZED, FOR SOLUTION INTRAMUSCULAR; INTRAVENOUS at 14:10

## 2018-11-01 RX ADMIN — Medication 325 MILLIGRAM(S): at 22:06

## 2018-11-01 RX ADMIN — Medication 81 MILLIGRAM(S): at 11:39

## 2018-11-01 RX ADMIN — Medication 10 MILLIGRAM(S): at 22:05

## 2018-11-01 RX ADMIN — Medication 100 MILLIGRAM(S): at 22:06

## 2018-11-01 RX ADMIN — Medication 100 MILLIGRAM(S): at 14:09

## 2018-11-01 RX ADMIN — HYDROMORPHONE HYDROCHLORIDE 0.25 MILLIGRAM(S): 2 INJECTION INTRAMUSCULAR; INTRAVENOUS; SUBCUTANEOUS at 02:11

## 2018-11-01 RX ADMIN — Medication 50 MILLIGRAM(S): at 18:29

## 2018-11-01 NOTE — PROGRESS NOTE ADULT - SUBJECTIVE AND OBJECTIVE BOX
[facility] - Glynn Bullock, Susan, Claudia, Flaquita, Terry Mora  Office Number:  [phone]    Patient resting comfortably in bed in NAD.  Laying flat with no respiratory distress.  No complaints of chest pain, dyspnea, palpitations, PND, or orthopnea.  Wants water to drink  no af since 10/31    ROS: negative unless otherwise mentioned.    Telemetry:  SR    MEDICATIONS  (STANDING):  aspirin enteric coated 81 milliGRAM(s) Oral daily  calcium carbonate 1250 mG  + Vitamin D (OsCal 500 + D) 1 Tablet(s) Oral daily  chlorhexidine 2% Cloths 1 Application(s) Topical daily  chlorhexidine 2% Cloths 1 Application(s) Topical daily  dextrose 5%. 1000 milliLiter(s) (50 mL/Hr) IV Continuous <Continuous>  dextrose 50% Injectable 12.5 Gram(s) IV Push once  dextrose 50% Injectable 25 Gram(s) IV Push once  dextrose 50% Injectable 25 Gram(s) IV Push once  ferrous    sulfate 325 milliGRAM(s) Oral three times a day  heparin  Injectable 5000 Unit(s) SubCutaneous every 8 hours  insulin lispro (HumaLOG) corrective regimen sliding scale   SubCutaneous every 6 hours  metoclopramide Injectable 10 milliGRAM(s) IV Push every 8 hours  metoprolol tartrate 50 milliGRAM(s) Oral two times a day  pantoprazole  Injectable 40 milliGRAM(s) IV Push daily  piperacillin/tazobactam IVPB. 3.375 Gram(s) IV Intermittent every 12 hours  ursodiol Capsule 300 milliGRAM(s) Oral every 12 hours    MEDICATIONS  (PRN):  acetaminophen   Tablet .. 650 milliGRAM(s) Oral every 6 hours PRN Mild Pain (1 - 3)  dextrose 40% Gel 15 Gram(s) Oral once PRN Blood Glucose LESS THAN 70 milliGRAM(s)/deciliter  glucagon  Injectable 1 milliGRAM(s) IntraMuscular once PRN Glucose LESS THAN 70 milligrams/deciliter  HYDROmorphone  Injectable 0.25 milliGRAM(s) IV Push every 4 hours PRN Moderate Pain (4 - 6)  HYDROmorphone  Injectable 0.5 milliGRAM(s) IV Push every 4 hours PRN Severe Pain (7 - 10)  simethicone 80 milliGRAM(s) Chew every 6 hours PRN abdominal bloating      Allergies    No Known Drug Allergies  Purell (Rash)    Intolerances        Vital Signs Last 24 Hrs  T(C): 36.5 (01 Nov 2018 05:22), Max: 36.9 (31 Oct 2018 08:42)  T(F): 97.7 (01 Nov 2018 05:22), Max: 98.4 (31 Oct 2018 08:42)  HR: 79 (01 Nov 2018 06:00) (74 - 83)  BP: 170/77 (01 Nov 2018 06:00) (108/68 - 186/74)  BP(mean): 111 (01 Nov 2018 06:00) (81 - 111)  RR: 23 (01 Nov 2018 06:00) (11 - 35)  SpO2: 99% (01 Nov 2018 06:00) (89% - 100%)    I&O's Summary    31 Oct 2018 07:01  -  01 Nov 2018 07:00  --------------------------------------------------------  IN: 420 mL / OUT: 550 mL / NET: -130 mL        ON EXAM:    Constitutional: NAD, frail  Eyes:   Pupils round, no lesions  ENMT: no exudate or erythema  Pulmonary:  breath sounds are clear bilaterally, No wheezing, rales or rhonchi  Cardiovascular: PMI not palpable RRR normal S1 and S2, no murmurs, rubs, gallops or clicks  Gastrointestinal: Bowel Sounds present, soft, nontender.   Lymph: No cervical lymphadenopathy.  Neurological: Alert, no focal deficits  Skin: No rashes.  No cyanosis.  Psych:  Mood & affect appropriate   Ext: No lower ext edema      LABS: All Labs Reviewed:                        10.2   15.58 )-----------( 147      ( 01 Nov 2018 05:33 )             35.2                         10.3   15.40 )-----------( 122      ( 31 Oct 2018 07:08 )             34.3                         11.1   24.63 )-----------( 158      ( 30 Oct 2018 09:04 )             36.4     01 Nov 2018 05:33    138    |  100    |  52     ----------------------------<  137    4.7     |  28     |  5.40   31 Oct 2018 07:08    137    |  101    |  32     ----------------------------<  130    4.3     |  28     |  4.10   30 Oct 2018 07:37    140    |  106    |  57     ----------------------------<  144    4.4     |  23     |  5.90     Ca    8.3        01 Nov 2018 05:33  Ca    8.2        31 Oct 2018 07:08  Ca    7.9        30 Oct 2018 07:37  Phos  5.6       01 Nov 2018 05:33  Phos  4.0       31 Oct 2018 07:08  Phos  4.4       30 Oct 2018 07:37  Mg     2.3       01 Nov 2018 05:33  Mg     2.0       31 Oct 2018 07:08  Mg     1.9       30 Oct 2018 07:37    TPro  7.1    /  Alb  2.0    /  TBili  3.9    /  DBili  x      /  AST  118    /  ALT  159    /  AlkPhos  317    01 Nov 2018 05:33  TPro  6.8    /  Alb  2.1    /  TBili  4.3    /  DBili  x      /  AST  272    /  ALT  208    /  AlkPhos  262    31 Oct 2018 07:08  TPro  6.1    /  Alb  1.9    /  TBili  3.9    /  DBili  3.40   /  AST  279    /  ALT  158    /  AlkPhos  319    30 Oct 2018 07:37    PT/INR - ( 01 Nov 2018 05:33 )   PT: 14.6 sec;   INR: 1.28 ratio         PTT - ( 01 Nov 2018 05:33 )  PTT:32.4 sec  CARDIAC MARKERS ( 01 Nov 2018 05:33 )  .173 ng/mL / x     / 50 U/L / x     / 1.3 ng/mL  CARDIAC MARKERS ( 30 Oct 2018 22:08 )  .308 ng/mL / x     / 179 U/L / x     / 3.1 ng/mL  CARDIAC MARKERS ( 30 Oct 2018 13:31 )  .373 ng/mL / x     / 155 U/L / x     / 2.7 ng/mL  CARDIAC MARKERS ( 30 Oct 2018 09:04 )  .348 ng/mL / x     / 104 U/L / x     / 1.8 ng/mL      Blood Culture: Organism --  Gram Stain Blood -- Gram Stain   No polymorphonuclear leukocytes seen per low power field  Numerous Gram Negative Rods seen per oil power field  Specimen Source Bile Bile Fluid  Culture-Blood --    Organism --  Gram Stain Blood -- Gram Stain   Growth in aerobic and anaerobic bottles: Gram Negative Rods  Specimen Source .Blood Blood-Peripheral  Culture-Blood --    Organism --  Gram Stain Blood -- Gram Stain --  Specimen Source .Urine Clean Catch (Midstream)  Culture-Blood --    Organism --  Gram Stain Blood -- Gram Stain   Growth in aerobic and anaerobic bottles: Gram Negative Rods  Specimen Source .Blood Blood-Peripheral  Culture-Blood --    Organism Blood Culture PCR  Gram Stain Blood -- Gram Stain   Growth in aerobic and anaerobic bottles: Gram Negative Rods  Specimen Source .Blood Blood-Peripheral  Culture-Blood --

## 2018-11-01 NOTE — PROGRESS NOTE ADULT - PROBLEM SELECTOR PLAN 1
Anticipate 7 days of effective therapy past date of documented negative blood cultures and local site control so end date still to be determined.

## 2018-11-01 NOTE — PROGRESS NOTE ADULT - SUBJECTIVE AND OBJECTIVE BOX
INTERVAL HPI/OVERNIGHT EVENTS:  pt seen and examined earlier this am  some confusion, asking for water, denies abd pain and nausea  per overnight rn pt anxious, +nausea overnight, no vomiting, ngt placed w 500cc output of bilious fluid, later pulled out, no bm overnight  afebrile overnight labs noted     MEDICATIONS  (STANDING):  aspirin enteric coated 81 milliGRAM(s) Oral daily  calcium carbonate 1250 mG  + Vitamin D (OsCal 500 + D) 1 Tablet(s) Oral daily  clopidogrel Tablet 75 milliGRAM(s) Oral daily  dextrose 5%. 1000 milliLiter(s) (50 mL/Hr) IV Continuous <Continuous>  dextrose 50% Injectable 12.5 Gram(s) IV Push once  dextrose 50% Injectable 25 Gram(s) IV Push once  dextrose 50% Injectable 25 Gram(s) IV Push once  docusate sodium 100 milliGRAM(s) Oral three times a day  ertapenem  IVPB      ertapenem  IVPB 500 milliGRAM(s) IV Intermittent once  ferrous    sulfate 325 milliGRAM(s) Oral three times a day  heparin  Injectable 5000 Unit(s) SubCutaneous every 8 hours  insulin lispro (HumaLOG) corrective regimen sliding scale   SubCutaneous every 6 hours  isosorbide   mononitrate ER Tablet (IMDUR) 30 milliGRAM(s) Oral every 24 hours  metoclopramide Injectable 10 milliGRAM(s) IV Push every 8 hours  metoprolol tartrate 50 milliGRAM(s) Oral two times a day  NIFEdipine XL 60 milliGRAM(s) Oral daily  pantoprazole  Injectable 40 milliGRAM(s) IV Push daily  senna 2 Tablet(s) Oral at bedtime  ursodiol Capsule 300 milliGRAM(s) Oral every 12 hours    MEDICATIONS  (PRN):  acetaminophen   Tablet .. 650 milliGRAM(s) Oral every 6 hours PRN Mild Pain (1 - 3)  dextrose 40% Gel 15 Gram(s) Oral once PRN Blood Glucose LESS THAN 70 milliGRAM(s)/deciliter  glucagon  Injectable 1 milliGRAM(s) IntraMuscular once PRN Glucose LESS THAN 70 milligrams/deciliter  HYDROmorphone  Injectable 0.25 milliGRAM(s) IV Push every 4 hours PRN Moderate Pain (4 - 6)  HYDROmorphone  Injectable 0.5 milliGRAM(s) IV Push every 4 hours PRN Severe Pain (7 - 10)  simethicone 80 milliGRAM(s) Chew every 6 hours PRN abdominal bloating      Allergies    No Known Drug Allergies  Purell (Rash)    Intolerances        Review of Systems:    unable to obtain in entirety    Vital Signs Last 24 Hrs  T(C): 36.6 (01 Nov 2018 10:20), Max: 36.8 (31 Oct 2018 20:06)  T(F): 97.8 (01 Nov 2018 10:20), Max: 98.3 (31 Oct 2018 20:06)  HR: 74 (01 Nov 2018 10:20) (72 - 81)  BP: 192/78 (01 Nov 2018 10:20) (108/68 - 192/78)  BP(mean): 112 (01 Nov 2018 10:20) (81 - 131)  RR: 21 (01 Nov 2018 10:00) (12 - 35)  SpO2: 94% (01 Nov 2018 10:20) (89% - 100%)    PHYSICAL EXAM:  Constitutional: NAD, lying in bed  HEENT: ncat  Neck: No LAD  Respiratory: dec bs  Cardiovascular: S1 and S2, RRR  Gastrointestinal: soft ttp mild dt perc kevin tube in place w +output  Extremities: edema  Vascular: 2+ peripheral pulses  Neurological: awake alert some confusion, anxious   Skin: No rashes    LABS:                        10.2   15.58 )-----------( 147      ( 01 Nov 2018 05:33 )             35.2     11-01    138  |  100  |  52<H>  ----------------------------<  137<H>  4.7   |  28  |  5.40<H>    Ca    8.3<L>      01 Nov 2018 05:33  Phos  5.6     11-01  Mg     2.3     11-01    TPro  7.1  /  Alb  2.0<L>  /  TBili  3.9<H>  /  DBili  x   /  AST  118<H>  /  ALT  159<H>  /  AlkPhos  317<H>  11-01    PT/INR - ( 01 Nov 2018 05:33 )   PT: 14.6 sec;   INR: 1.28 ratio         PTT - ( 01 Nov 2018 05:33 )  PTT:32.4 sec      RADIOLOGY & ADDITIONAL TESTS:

## 2018-11-01 NOTE — PROGRESS NOTE ADULT - PROBLEM SELECTOR PLAN 2
Ac cholecystitis with gall stones, cholangitis and choledocholithiasis  S/P P/C cholecystoscopy and drainage   S/P MRCP no stone in CBD, FU GI, may need ERCP  improving in AST/ALT, c/w ursodiol.  fluid cx + for GNR, c/w Zosyn  further management per ICU Ac cholecystitis with gall stones, cholangitis and choledocholithiasis  S/P P/C cholecystoscopy and drainage   S/P MRCP no stone in CBD, FU GI, may need ERCP  improving in AST/ALT, c/w ursodiol.  fluid cx + for  E.coli switched to Ertapenem. ID  f/u  further management per ICU

## 2018-11-01 NOTE — PROGRESS NOTE ADULT - PROBLEM SELECTOR PLAN 6
BG improving at 130. Continue to monitor   -c/w lantus and low dose insulin sliding scale per ICU recs  - accuchecks  -HgA1c 9.7

## 2018-11-01 NOTE — PROGRESS NOTE ADULT - PROBLEM SELECTOR PLAN 3
s/p stent in 2007. trops are trending down at .308.  Afib with RVR. Received amio and digoxin .5mg IV. Rate improved.  C/W Asa and resumed plavix  Cardio recomm appreciated.

## 2018-11-01 NOTE — PROGRESS NOTE ADULT - ASSESSMENT
69 yo with cholecystitis, s/p perc cholecystostomy.  Slowing improving, bilirubin starting to decline, but remaining lft up.       cont abx     gi following     kub to evaluate for ileus     HD as needed

## 2018-11-01 NOTE — PROGRESS NOTE ADULT - PROBLEM SELECTOR PLAN 1
2/2 acute calculus cholecystitis with biliary dilatation with common bile duct stone.   Blood Cx + for ESBL. E.coli switched to Ertapenem. ID  f/u  Ac Respiratory failure resolved. on supplemental O2 with nasal cannula  Improving BP and breathing. lactic acidosis resolved  Improving WBCs, INR, renal indices and LFTs. AlkPo4 trending up

## 2018-11-01 NOTE — PROGRESS NOTE ADULT - SUBJECTIVE AND OBJECTIVE BOX
Patient is a 68y old  Female who presents with a chief complaint of Sepsis (30 Oct 2018 13:42)      INTERVAL HPI/OVERNIGHT EVENTS: Pt seen and examined bedside, has no complaints. pt is alert and awake with family bed side. pt feels better and except pain in RLE. is on HD    MEDICATIONS  (STANDING):  aspirin enteric coated 81 milliGRAM(s) Oral daily  calcium carbonate 1250 mG  + Vitamin D (OsCal 500 + D) 1 Tablet(s) Oral daily  clopidogrel Tablet 75 milliGRAM(s) Oral daily  dextrose 5%. 1000 milliLiter(s) (50 mL/Hr) IV Continuous <Continuous>  dextrose 50% Injectable 12.5 Gram(s) IV Push once  dextrose 50% Injectable 25 Gram(s) IV Push once  dextrose 50% Injectable 25 Gram(s) IV Push once  docusate sodium 100 milliGRAM(s) Oral three times a day  ertapenem  IVPB      ferrous    sulfate 325 milliGRAM(s) Oral three times a day  gabapentin 300 milliGRAM(s) Oral two times a day  heparin  Injectable 5000 Unit(s) SubCutaneous every 8 hours  insulin lispro (HumaLOG) corrective regimen sliding scale   SubCutaneous every 6 hours  isosorbide   mononitrate ER Tablet (IMDUR) 30 milliGRAM(s) Oral every 24 hours  metoclopramide Injectable 10 milliGRAM(s) IV Push every 8 hours  metoprolol tartrate 50 milliGRAM(s) Oral two times a day  NIFEdipine XL 60 milliGRAM(s) Oral daily  senna 2 Tablet(s) Oral at bedtime  ursodiol Capsule 300 milliGRAM(s) Oral every 12 hours    MEDICATIONS  (PRN):  acetaminophen   Tablet .. 650 milliGRAM(s) Oral every 6 hours PRN Mild Pain (1 - 3)  dextrose 40% Gel 15 Gram(s) Oral once PRN Blood Glucose LESS THAN 70 milliGRAM(s)/deciliter  glucagon  Injectable 1 milliGRAM(s) IntraMuscular once PRN Glucose LESS THAN 70 milligrams/deciliter  HYDROmorphone  Injectable 0.25 milliGRAM(s) IV Push every 4 hours PRN Moderate Pain (4 - 6)  HYDROmorphone  Injectable 0.5 milliGRAM(s) IV Push every 4 hours PRN Severe Pain (7 - 10)  simethicone 80 milliGRAM(s) Chew every 6 hours PRN abdominal bloating      REVIEW OF SYSTEMS  Constitutional: Fatigue; No fever, chills   Neuro: No headache, numbness, weakness  Resp:  No Cough, no wheezing; dyspnea on exertion   CVS: No chest pain, palpitations, leg swelling   GI: +abdominal pain, nausea, vomiting, diarrhea   : No dysuria, frequency, incontinence  Skin: No itching, burning, rashes, or lesions   Msk: No weakness, joint pain, RLE pain  Psych: No depression, anxiety, mood swings    ICU Vital Signs Last 24 Hrs  T(C): 36.4 (01 Nov 2018 15:42), Max: 36.8 (31 Oct 2018 20:06)  T(F): 97.5 (01 Nov 2018 15:42), Max: 98.3 (31 Oct 2018 20:06)  HR: 87 (01 Nov 2018 13:56) (69 - 87)  BP: 168/87 (01 Nov 2018 13:56) (108/68 - 201/84)  BP(mean): 107 (01 Nov 2018 13:00) (82 - 131)  ABP: --  ABP(mean): --  RR: 19 (01 Nov 2018 13:00) (8 - 35)  SpO2: 95% (01 Nov 2018 13:56) (89% - 100%)      PHYSICAL EXAM:  GENERAL: NAD. on O2 nc  HEENT:  EOMI, moist mucous membranes  CHEST/LUNG:  decreased bs b/l, no rales, wheezes, or rhonchi  HEART:  RRR, S1, S2  ABDOMEN:  BS+, soft, mildly tender, nondistended, with drain having serosanguinous and dark fluid  EXTREMITIES: no edema, cyanosis, or calf tenderness  NERVOUS SYSTEM: AA&Ox3    LABS:                        10.2   15.58 )-----------( 147      ( 01 Nov 2018 05:33 )             35.2     01 Nov 2018 05:33    138    |  100    |  52     ----------------------------<  137    4.7     |  28     |  5.40     Ca    8.3        01 Nov 2018 05:33  Phos  5.6       01 Nov 2018 05:33  Mg     2.3       01 Nov 2018 05:33    TPro  7.1    /  Alb  2.0    /  TBili  3.9    /  DBili  x      /  AST  118    /  ALT  159    /  AlkPhos  317    01 Nov 2018 05:33    PT/INR - ( 01 Nov 2018 05:33 )   PT: 14.6 sec;   INR: 1.28 ratio         PTT - ( 01 Nov 2018 05:33 )  PTT:32.4 sec    CAPILLARY BLOOD GLUCOSE      POCT Blood Glucose.: 107 mg/dL (01 Nov 2018 11:52)  POCT Blood Glucose.: 143 mg/dL (01 Nov 2018 05:36)  POCT Blood Glucose.: 141 mg/dL (31 Oct 2018 23:21)  POCT Blood Glucose.: 122 mg/dL (31 Oct 2018 18:20)    UCx       RADIOLOGY & ADDITIONAL TESTS:          Culture - Blood (collected 10-29-18 @ 19:05)  Source: .Blood Blood-Peripheral  Gram Stain (10-30-18 @ 03:51):    Growth in aerobic and anaerobic bottles: Gram Negative Rods  Preliminary Report (10-30-18 @ 03:51):    Growth in aerobic and anaerobic bottles: Gram Negative Rods    Culture - Blood (collected 10-29-18 @ 19:03)  Source: .Blood Blood-Peripheral  Gram Stain (10-30-18 @ 03:49):    Growth in aerobic and anaerobic bottles: Gram Negative Rods  Preliminary Report (10-30-18 @ 03:49):    Growth in aerobic and anaerobic bottles: Gram Negative Rods    "Due to technical problems, Proteus sp. will Not be reported as part of    the BCID panel until further notice"    ***Blood Panel PCR results on this specimen are available    approximately 3 hours after the Gram stain result.***    Gram stain, PCR, and/or culture results may not always    correspond due to difference in methodologies.    ************************************************************    This PCR assay was performed using [employer].    The following targets are tested for: Enterococcus,    vancomycin resistant enterococci, Listeria monocytogenes,    coagulase negative staphylococci, S. aureus,    methicillin resistant S. aureus, Streptococcus agalactiae    (Group B), S. pneumoniae, S. pyogenes (Group A),    Acinetobacter baumannii, Enterobacter cloacae, E. coli,    Klebsiella oxytoca, K. pneumoniae, Proteus sp.,    Serratia marcescens, Haemophilus influenzae,    Neisseria meningitidis, Pseudomonas aeruginosa, Candida    albicans, C. glabrata, C krusei, C parapsilosis,    C. tropicalis and the KPC resistance gene.  Organism: Blood Culture PCR (10-30-18 @ 05:12)  Organism: Blood Culture PCR (10-30-18 @ 05:12)      -  Escherichia coli: Detec      Method Type: PCR        RADIOLOGY & ADDITIONAL TESTS:    Personally reviewed.     Consultant(s) Notes Reviewed:  [x] YES  [ ] NO    Care Discussed with [x] Consultants  [x] Patient  [ ] Family  [ ]      [ ] Other; RN  DVT ppx

## 2018-11-01 NOTE — PROGRESS NOTE ADULT - PROBLEM SELECTOR PLAN 1
s/p perc c-tube  bld cxs w gnr, ecoli  f/u kub  serial lfts  would also check ammonia level  clears as tolerated, diet as per surgery  cont abx  reglan for n/v  cont ppi  cont mily  bowel regimen  monitor drain output  f/u surgery recs  id following  monitor abd exam  pain control  lft trend noted suspect component r/t sepsis, cholestasis, no plans for ercp at present, cont to trend labs, further recs pending clinical course, will follow  above plan of care dw dr castle, agreeable

## 2018-11-01 NOTE — PROGRESS NOTE ADULT - ASSESSMENT
·	ESRD on HD  ·	Cholecystitis, s/p Perc cholecystostomy  ·	Diabetes  ·	Hypertension    HD today. To continue HD TIW as scheduled. Fluid removal as tolerated by BP.   Dietary and PO fluid restriction. Epogen as needed for anemia. On IV abx.  Monitor BP trend. Titrate BP meds as needed. Salt restriction.   Monitor blood sugar levels. Insulin coverage as needed. Surgery follow up.   D/w family at bedside.

## 2018-11-01 NOTE — PROGRESS NOTE ADULT - ASSESSMENT
68F with multiple medical issues including ESRD/HD, CAD/MI/stent, DM2  Acute cholecystitis  S/P Perc Noa 10/30 and ESBL E. coli bacteremia

## 2018-11-01 NOTE — PROGRESS NOTE ADULT - SUBJECTIVE AND OBJECTIVE BOX
Patient is a 68y old  Female who presents with a chief complaint of Sepsis (01 Nov 2018 07:08)    24 hour events:   --NGT placed due to vomiting and nausea. Drained 500mL+ of brown fluid. Patient removed NGT, now refused placement. Received reglan this morning.   --Received Xanax this morning for agitation    REVIEW OF SYSTEMS  Constitutional: No fever, chills, fatigue  Neuro: No headache, numbness, weakness  Resp: No cough, wheezing, shortness of breath  CVS: No chest pain, palpitations, leg swelling  GI: No abdominal pain, nausea, vomiting, diarrhea   : No dysuria, frequency, incontinence  Skin: No itching, burning, rashes, or lesions   Msk: No joint pain or swelling  Psych: No depression, anxiety, mood swings  Heme: No bleeding    T(F): 97.7 (11-01-18 @ 05:22), Max: 98.4 (10-31-18 @ 08:42)  HR: 76 (11-01-18 @ 07:00) (74 - 83)  BP: 150/66 (11-01-18 @ 07:00) (108/68 - 186/74)  RR: 28 (11-01-18 @ 07:00) (11 - 35)  SpO2: 100% (11-01-18 @ 07:00) (89% - 100%)  Wt(kg): --      CAPILLARY BLOOD GLUCOSE      POCT Blood Glucose.: 143 mg/dL (01 Nov 2018 05:36)  POCT Blood Glucose.: 141 mg/dL (31 Oct 2018 23:21)  POCT Blood Glucose.: 122 mg/dL (31 Oct 2018 18:20)  POCT Blood Glucose.: 155 mg/dL (31 Oct 2018 11:45)    I&O's Summary    10-31 @ 07:01  -  11-01 @ 07:00  --------------------------------------------------------  IN: 420 mL / OUT: 650 mL / NET: -230 mL      PHYSICAL EXAM  General:   CNS:   HEENT:   Resp:   CVS:   Abd:   Ext:   Skin:     MEDICATIONS  piperacillin/tazobactam IVPB. IV Intermittent  metoprolol tartrate Oral  dextrose 40% Gel Oral PRN  dextrose 50% Injectable IV Push  dextrose 50% Injectable IV Push  dextrose 50% Injectable IV Push  glucagon  Injectable IntraMuscular PRN  insulin lispro (HumaLOG) corrective regimen sliding scale SubCutaneous  acetaminophen   Tablet .. Oral PRN  HYDROmorphone  Injectable IV Push PRN  HYDROmorphone  Injectable IV Push PRN  metoclopramide Injectable IV Push  aspirin enteric coated Oral  heparin  Injectable SubCutaneous  pantoprazole  Injectable IV Push  simethicone Chew PRN  ursodiol Capsule Oral  calcium carbonate 1250 mG  + Vitamin D (OsCal 500 + D) Oral  dextrose 5%. IV Continuous  ferrous    sulfate Oral  chlorhexidine 2% Cloths Topical  chlorhexidine 2% Cloths Topical                            10.2   15.58 )-----------( 147      ( 01 Nov 2018 05:33 )             35.2       11-01    138  |  100  |  52<H>  ----------------------------<  137<H>  4.7   |  28  |  5.40<H>    Ca    8.3<L>      01 Nov 2018 05:33  Phos  5.6     11-01  Mg     2.3     11-01    TPro  7.1  /  Alb  2.0<L>  /  TBili  3.9<H>  /  DBili  x   /  AST  118<H>  /  ALT  159<H>  /  AlkPhos  317<H>  11-01      CARDIAC MARKERS ( 01 Nov 2018 05:33 )  .173 ng/mL / x     / 50 U/L / x     / 1.3 ng/mL  CARDIAC MARKERS ( 30 Oct 2018 22:08 )  .308 ng/mL / x     / 179 U/L / x     / 3.1 ng/mL  CARDIAC MARKERS ( 30 Oct 2018 13:31 )  .373 ng/mL / x     / 155 U/L / x     / 2.7 ng/mL  CARDIAC MARKERS ( 30 Oct 2018 09:04 )  .348 ng/mL / x     / 104 U/L / x     / 1.8 ng/mL      PT/INR - ( 01 Nov 2018 05:33 )   PT: 14.6 sec;   INR: 1.28 ratio         PTT - ( 01 Nov 2018 05:33 )  PTT:32.4 sec    Bile Bile Fluid   Numerous Escherichia coli  Moderate Alpha hemolytic strep "Susceptibilities not performed"   No polymorphonuclear leukocytes seen per low power field  Numerous Gram Negative Rods seen per oil power field 10-30 @ 21:29  .Blood Blood-Peripheral   Growth in aerobic and anaerobic bottles: Gram Negative Rods   Growth in aerobic and anaerobic bottles: Gram Negative Rods 10-30 @ 10:47  .Urine Clean Catch (Midstream)   No growth -- 10-29 @ 19:10  .Blood Blood-Peripheral   Growth in aerobic and anaerobic bottles: Escherichia coli  See previous culture [id number]   Growth in aerobic and anaerobic bottles: Gram Negative Rods 10-29 @ 19:05  .Blood Blood-Peripheral   Growth in aerobic and anaerobic bottles: Escherichia coli  "Due to technical problems, Proteus sp. will Not be reported as part of  the BCID panel until further notice"  ***Blood Panel PCR results on this specimen are available  approximately 3 hours after the Gram stain result.***  Gram stain, PCR, and/or culture results may not always  correspond due to difference in methodologies.  ************************************************************  This PCR assay was performed using [employer].  The following targets are tested for: Enterococcus,  vancomycin resistant enterococci, Listeria monocytogenes,  coagulase negative staphylococci, S. aureus,  methicillin resistant S. aureus, Streptococcus agalactiae  (Group B), S. pneumoniae, S.pyogenes (Group A),  Acinetobacter baumannii, Enterobacter cloacae, E. coli,  Klebsiella oxytoca, K. pneumoniae, Proteus sp.,  Serratia marcescens, Haemophilus influenzae,  Neisseria meningitidis, Pseudomonas aeruginosa, Candida  albicans, C. glabrata, C krusei, C parapsilosis,  C. tropicalis and the KPC resistance gene.   Growth in aerobic and anaerobic bottles: Gram Negative Rods 10-29 @ 19:03      Rapid RVP Result: NotDetec (10-29 @ 15:30)    Radiology: ***  Bedside lung ultrasound: ***  Bedside ECHO: ***    CENTRAL LINE: Y (Left IJ)         DATE INSERTED: 10/30/18             REMOVE: Y (10/31/18)  BERMUDEZ: N       (Pt is ESRD)                  A-LINE: N                           GLOBAL ISSUE/BEST PRACTICE  Analgesia: yes  Sedation: no  HOB elevation: yes  Stress ulcer prophylaxis: yes  VTE prophylaxis: yes  Glycemic control: yes  Nutrition: NPO    CODE STATUS: Full  GOC discussion: Y Patient is a 68y old  Female who presents with a chief complaint of Sepsis (01 Nov 2018 07:08)    24 hour events:   --NGT placed due to vomiting and nausea. Drained > 500cc of brown fluid. Patient removed NGT, now refused placement. Received reglan this morning.   --Received Xanax this morning for agitation  --This morning, patient complains of abdominal pain and generalized fatigue     REVIEW OF SYSTEMS  Unable to obtain meaningful ROS due to mental status     T(F): 97.7 (11-01-18 @ 05:22), Max: 98.4 (10-31-18 @ 08:42)  HR: 76 (11-01-18 @ 07:00) (74 - 83)  BP: 150/66 (11-01-18 @ 07:00) (108/68 - 186/74)  RR: 28 (11-01-18 @ 07:00) (11 - 35)  SpO2: 100% (11-01-18 @ 07:00) (89% - 100%)  Wt(kg): --      CAPILLARY BLOOD GLUCOSE      POCT Blood Glucose.: 143 mg/dL (01 Nov 2018 05:36)  POCT Blood Glucose.: 141 mg/dL (31 Oct 2018 23:21)  POCT Blood Glucose.: 122 mg/dL (31 Oct 2018 18:20)  POCT Blood Glucose.: 155 mg/dL (31 Oct 2018 11:45)    I&O's Summary    10-31 @ 07:01  -  11-01 @ 07:00  --------------------------------------------------------  IN: 420 mL / OUT: 650 mL / NET: -230 mL      PHYSICAL EXAM  General:   CNS:   HEENT:   Resp:   CVS:   Abd:   Ext:   Skin:     MEDICATIONS  piperacillin/tazobactam IVPB. IV Intermittent  metoprolol tartrate Oral  dextrose 40% Gel Oral PRN  dextrose 50% Injectable IV Push  dextrose 50% Injectable IV Push  dextrose 50% Injectable IV Push  glucagon  Injectable IntraMuscular PRN  insulin lispro (HumaLOG) corrective regimen sliding scale SubCutaneous  acetaminophen   Tablet .. Oral PRN  HYDROmorphone  Injectable IV Push PRN  HYDROmorphone  Injectable IV Push PRN  metoclopramide Injectable IV Push  aspirin enteric coated Oral  heparin  Injectable SubCutaneous  pantoprazole  Injectable IV Push  simethicone Chew PRN  ursodiol Capsule Oral  calcium carbonate 1250 mG  + Vitamin D (OsCal 500 + D) Oral  dextrose 5%. IV Continuous  ferrous    sulfate Oral  chlorhexidine 2% Cloths Topical  chlorhexidine 2% Cloths Topical                            10.2   15.58 )-----------( 147      ( 01 Nov 2018 05:33 )             35.2       11-01    138  |  100  |  52<H>  ----------------------------<  137<H>  4.7   |  28  |  5.40<H>    Ca    8.3<L>      01 Nov 2018 05:33  Phos  5.6     11-01  Mg     2.3     11-01    TPro  7.1  /  Alb  2.0<L>  /  TBili  3.9<H>  /  DBili  x   /  AST  118<H>  /  ALT  159<H>  /  AlkPhos  317<H>  11-01      CARDIAC MARKERS ( 01 Nov 2018 05:33 )  .173 ng/mL / x     / 50 U/L / x     / 1.3 ng/mL  CARDIAC MARKERS ( 30 Oct 2018 22:08 )  .308 ng/mL / x     / 179 U/L / x     / 3.1 ng/mL  CARDIAC MARKERS ( 30 Oct 2018 13:31 )  .373 ng/mL / x     / 155 U/L / x     / 2.7 ng/mL  CARDIAC MARKERS ( 30 Oct 2018 09:04 )  .348 ng/mL / x     / 104 U/L / x     / 1.8 ng/mL      PT/INR - ( 01 Nov 2018 05:33 )   PT: 14.6 sec;   INR: 1.28 ratio         PTT - ( 01 Nov 2018 05:33 )  PTT:32.4 sec    Bile Bile Fluid   Numerous Escherichia coli  Moderate Alpha hemolytic strep "Susceptibilities not performed"   No polymorphonuclear leukocytes seen per low power field  Numerous Gram Negative Rods seen per oil power field 10-30 @ 21:29  .Blood Blood-Peripheral   Growth in aerobic and anaerobic bottles: Gram Negative Rods   Growth in aerobic and anaerobic bottles: Gram Negative Rods 10-30 @ 10:47  .Urine Clean Catch (Midstream)   No growth -- 10-29 @ 19:10  .Blood Blood-Peripheral   Growth in aerobic and anaerobic bottles: Escherichia coli  See previous culture [id number]   Growth in aerobic and anaerobic bottles: Gram Negative Rods 10-29 @ 19:05  .Blood Blood-Peripheral   Growth in aerobic and anaerobic bottles: Escherichia coli  "Due to technical problems, Proteus sp. will Not be reported as part of  the BCID panel until further notice"  ***Blood Panel PCR results on this specimen are available  approximately 3 hours after the Gram stain result.***  Gram stain, PCR, and/or culture results may not always  correspond due to difference in methodologies.  ************************************************************  This PCR assay was performed using [employer].  The following targets are tested for: Enterococcus,  vancomycin resistant enterococci, Listeria monocytogenes,  coagulase negative staphylococci, S. aureus,  methicillin resistant S. aureus, Streptococcus agalactiae  (Group B), S. pneumoniae, S.pyogenes (Group A),  Acinetobacter baumannii, Enterobacter cloacae, E. coli,  Klebsiella oxytoca, K. pneumoniae, Proteus sp.,  Serratia marcescens, Haemophilus influenzae,  Neisseria meningitidis, Pseudomonas aeruginosa, Candida  albicans, C. glabrata, C krusei, C parapsilosis,  C. tropicalis and the KPC resistance gene.   Growth in aerobic and anaerobic bottles: Gram Negative Rods 10-29 @ 19:03      Rapid RVP Result: NotDetec (10-29 @ 15:30)    Radiology: ***  Bedside lung ultrasound: ***  Bedside ECHO: ***    CENTRAL LINE: Y (Left IJ)         DATE INSERTED: 10/30/18             REMOVE: Y (10/31/18)  BERMUDEZ: N       (Pt is ESRD)                  A-LINE: N                           GLOBAL ISSUE/BEST PRACTICE  Analgesia: yes  Sedation: no  HOB elevation: yes  Stress ulcer prophylaxis: yes  VTE prophylaxis: yes  Glycemic control: yes  Nutrition: NPO    CODE STATUS: Full  GOC discussion: Y Patient is a 68y old  Female who presents with a chief complaint of Sepsis (01 Nov 2018 07:08)    24 hour events:   --NGT placed due to vomiting and nausea. Drained > 500cc of brown fluid. Patient removed NGT, now refused placement. Received reglan this morning.   --Received Xanax this morning for agitation  --This morning, patient complains of abdominal pain and generalized fatigue     REVIEW OF SYSTEMS  Unable to obtain meaningful ROS due to mental status     T(F): 97.7 (11-01-18 @ 05:22), Max: 98.4 (10-31-18 @ 08:42)  HR: 76 (11-01-18 @ 07:00) (74 - 83)  BP: 150/66 (11-01-18 @ 07:00) (108/68 - 186/74)  RR: 28 (11-01-18 @ 07:00) (11 - 35)  SpO2: 100% (11-01-18 @ 07:00) (89% - 100%)  Wt(kg): --      CAPILLARY BLOOD GLUCOSE      POCT Blood Glucose.: 143 mg/dL (01 Nov 2018 05:36)  POCT Blood Glucose.: 141 mg/dL (31 Oct 2018 23:21)  POCT Blood Glucose.: 122 mg/dL (31 Oct 2018 18:20)  POCT Blood Glucose.: 155 mg/dL (31 Oct 2018 11:45)    I&O's Summary    10-31 @ 07:01  -  11-01 @ 07:00  --------------------------------------------------------  IN: 420 mL / OUT: 650 mL / NET: -230 mL      PHYSICAL EXAM  General:   CNS:   HEENT:   Resp:   CVS:   Abd:   Ext:   Skin:     MEDICATIONS  piperacillin/tazobactam IVPB. IV Intermittent  metoprolol tartrate Oral  dextrose 40% Gel Oral PRN  dextrose 50% Injectable IV Push  dextrose 50% Injectable IV Push  dextrose 50% Injectable IV Push  glucagon  Injectable IntraMuscular PRN  insulin lispro (HumaLOG) corrective regimen sliding scale SubCutaneous  acetaminophen   Tablet .. Oral PRN  HYDROmorphone  Injectable IV Push PRN  HYDROmorphone  Injectable IV Push PRN  metoclopramide Injectable IV Push  aspirin enteric coated Oral  heparin  Injectable SubCutaneous  pantoprazole  Injectable IV Push  simethicone Chew PRN  ursodiol Capsule Oral  calcium carbonate 1250 mG  + Vitamin D (OsCal 500 + D) Oral  dextrose 5%. IV Continuous  ferrous    sulfate Oral  chlorhexidine 2% Cloths Topical  chlorhexidine 2% Cloths Topical                            10.2   15.58 )-----------( 147      ( 01 Nov 2018 05:33 )             35.2       11-01    138  |  100  |  52<H>  ----------------------------<  137<H>  4.7   |  28  |  5.40<H>    Ca    8.3<L>      01 Nov 2018 05:33  Phos  5.6     11-01  Mg     2.3     11-01    TPro  7.1  /  Alb  2.0<L>  /  TBili  3.9<H>  /  DBili  x   /  AST  118<H>  /  ALT  159<H>  /  AlkPhos  317<H>  11-01      CARDIAC MARKERS ( 01 Nov 2018 05:33 )  .173 ng/mL / x     / 50 U/L / x     / 1.3 ng/mL  CARDIAC MARKERS ( 30 Oct 2018 22:08 )  .308 ng/mL / x     / 179 U/L / x     / 3.1 ng/mL  CARDIAC MARKERS ( 30 Oct 2018 13:31 )  .373 ng/mL / x     / 155 U/L / x     / 2.7 ng/mL  CARDIAC MARKERS ( 30 Oct 2018 09:04 )  .348 ng/mL / x     / 104 U/L / x     / 1.8 ng/mL      PT/INR - ( 01 Nov 2018 05:33 )   PT: 14.6 sec;   INR: 1.28 ratio         PTT - ( 01 Nov 2018 05:33 )  PTT:32.4 sec    Bile Bile Fluid   Numerous Escherichia coli  Moderate Alpha hemolytic strep "Susceptibilities not performed"   No polymorphonuclear leukocytes seen per low power field  Numerous Gram Negative Rods seen per oil power field 10-30 @ 21:29  .Blood Blood-Peripheral   Growth in aerobic and anaerobic bottles: Gram Negative Rods   Growth in aerobic and anaerobic bottles: Gram Negative Rods 10-30 @ 10:47  .Urine Clean Catch (Midstream)   No growth -- 10-29 @ 19:10  .Blood Blood-Peripheral   Growth in aerobic and anaerobic bottles: Escherichia coli  See previous culture [id number]   Growth in aerobic and anaerobic bottles: Gram Negative Rods 10-29 @ 19:05  .Blood Blood-Peripheral   Growth in aerobic and anaerobic bottles: Escherichia coli  "Due to technical problems, Proteus sp. will Not be reported as part of  the BCID panel until further notice"  ***Blood Panel PCR results on this specimen are available  approximately 3 hours after the Gram stain result.***  Gram stain, PCR, and/or culture results may not always  correspond due to difference in methodologies.  ************************************************************  This PCR assay was performed using [employer].  The following targets are tested for: Enterococcus,  vancomycin resistant enterococci, Listeria monocytogenes,  coagulase negative staphylococci, S. aureus,  methicillin resistant S. aureus, Streptococcus agalactiae  (Group B), S. pneumoniae, S.pyogenes (Group A),  Acinetobacter baumannii, Enterobacter cloacae, E. coli,  Klebsiella oxytoca, K. pneumoniae, Proteus sp.,  Serratia marcescens, Haemophilus influenzae,  Neisseria meningitidis, Pseudomonas aeruginosa, Candida  albicans, C. glabrata, C krusei, C parapsilosis,  C. tropicalis and the KPC resistance gene.   Growth in aerobic and anaerobic bottles: Gram Negative Rods 10-29 @ 19:03      Rapid RVP Result: NotDetec (10-29 @ 15:30)    CENTRAL LINE: N  BERMUDEZ: N       (Pt is ESRD)                  A-LINE: N                           GLOBAL ISSUE/BEST PRACTICE  Analgesia: yes  Sedation: no  HOB elevation: yes  Stress ulcer prophylaxis: yes  VTE prophylaxis: yes  Glycemic control: yes  Nutrition: Clear liquid diet     CODE STATUS: Full  GOC discussion: Y Patient is a 68y old  Female who presents with a chief complaint of Sepsis (01 Nov 2018 07:08)    24 hour events:   --NGT placed due to vomiting and nausea. Drained > 500cc of brown fluid. Patient removed NGT, now refused placement. Received reglan this morning.   --Received Xanax this morning for agitation  --This morning, patient complains of abdominal pain and generalized fatigue     REVIEW OF SYSTEMS  Unable to obtain meaningful ROS due to mental status     T(F): 97.7 (11-01-18 @ 05:22), Max: 98.4 (10-31-18 @ 08:42)  HR: 76 (11-01-18 @ 07:00) (74 - 83)  BP: 150/66 (11-01-18 @ 07:00) (108/68 - 186/74)  RR: 28 (11-01-18 @ 07:00) (11 - 35)  SpO2: 100% (11-01-18 @ 07:00) (89% - 100%)  Wt(kg): --      CAPILLARY BLOOD GLUCOSE      POCT Blood Glucose.: 143 mg/dL (01 Nov 2018 05:36)  POCT Blood Glucose.: 141 mg/dL (31 Oct 2018 23:21)  POCT Blood Glucose.: 122 mg/dL (31 Oct 2018 18:20)  POCT Blood Glucose.: 155 mg/dL (31 Oct 2018 11:45)    I&O's Summary    10-31 @ 07:01  -  11-01 @ 07:00  --------------------------------------------------------  IN: 420 mL / OUT: 650 mL / NET: -230 mL      PHYSICAL EXAM  General: Awake.   HEENT: NCAT  Resp: Non-labored. lungs cta b/l. no w/r/r.  CVS: RRR. S1 and S2 noted. no m/r/g.   Abd: Diffuse tenderness to palpation. Perc kevin draining brown fluid.   Ext: B/l calf tenderness.     MEDICATIONS  piperacillin/tazobactam IVPB. IV Intermittent  metoprolol tartrate Oral  dextrose 40% Gel Oral PRN  dextrose 50% Injectable IV Push  dextrose 50% Injectable IV Push  dextrose 50% Injectable IV Push  glucagon  Injectable IntraMuscular PRN  insulin lispro (HumaLOG) corrective regimen sliding scale SubCutaneous  acetaminophen   Tablet .. Oral PRN  HYDROmorphone  Injectable IV Push PRN  HYDROmorphone  Injectable IV Push PRN  metoclopramide Injectable IV Push  aspirin enteric coated Oral  heparin  Injectable SubCutaneous  pantoprazole  Injectable IV Push  simethicone Chew PRN  ursodiol Capsule Oral  calcium carbonate 1250 mG  + Vitamin D (OsCal 500 + D) Oral  dextrose 5%. IV Continuous  ferrous    sulfate Oral  chlorhexidine 2% Cloths Topical  chlorhexidine 2% Cloths Topical                            10.2   15.58 )-----------( 147      ( 01 Nov 2018 05:33 )             35.2       11-01    138  |  100  |  52<H>  ----------------------------<  137<H>  4.7   |  28  |  5.40<H>    Ca    8.3<L>      01 Nov 2018 05:33  Phos  5.6     11-01  Mg     2.3     11-01    TPro  7.1  /  Alb  2.0<L>  /  TBili  3.9<H>  /  DBili  x   /  AST  118<H>  /  ALT  159<H>  /  AlkPhos  317<H>  11-01      CARDIAC MARKERS ( 01 Nov 2018 05:33 )  .173 ng/mL / x     / 50 U/L / x     / 1.3 ng/mL  CARDIAC MARKERS ( 30 Oct 2018 22:08 )  .308 ng/mL / x     / 179 U/L / x     / 3.1 ng/mL  CARDIAC MARKERS ( 30 Oct 2018 13:31 )  .373 ng/mL / x     / 155 U/L / x     / 2.7 ng/mL  CARDIAC MARKERS ( 30 Oct 2018 09:04 )  .348 ng/mL / x     / 104 U/L / x     / 1.8 ng/mL      PT/INR - ( 01 Nov 2018 05:33 )   PT: 14.6 sec;   INR: 1.28 ratio       PTT - ( 01 Nov 2018 05:33 )  PTT:32.4 sec    Bile Bile Fluid   Numerous Escherichia coli  Moderate Alpha hemolytic strep "Susceptibilities not performed"   No polymorphonuclear leukocytes seen per low power field  Numerous Gram Negative Rods seen per oil power field 10-30 @ 21:29  .Blood Blood-Peripheral   Growth in aerobic and anaerobic bottles: Gram Negative Rods   Growth in aerobic and anaerobic bottles: Gram Negative Rods 10-30 @ 10:47  .Urine Clean Catch (Midstream)   No growth -- 10-29 @ 19:10  .Blood Blood-Peripheral   Growth in aerobic and anaerobic bottles: Escherichia coli  See previous culture [id number]   Growth in aerobic and anaerobic bottles: Gram Negative Rods 10-29 @ 19:05  .Blood Blood-Peripheral   Growth in aerobic and anaerobic bottles: Escherichia coli  "Due to technical problems, Proteus sp. will Not be reported as part of  the BCID panel until further notice"  ***Blood Panel PCR results on this specimen are available  approximately 3 hours after the Gram stain result.***  Gram stain, PCR, and/or culture results may not always  correspond due to difference in methodologies.  ************************************************************  This PCR assay was performed using [employer].  The following targets are tested for: Enterococcus,  vancomycin resistant enterococci, Listeria monocytogenes,  coagulase negative staphylococci, S. aureus,  methicillin resistant S. aureus, Streptococcus agalactiae  (Group B), S. pneumoniae, S.pyogenes (Group A),  Acinetobacter baumannii, Enterobacter cloacae, E. coli,  Klebsiella oxytoca, K. pneumoniae, Proteus sp.,  Serratia marcescens, Haemophilus influenzae,  Neisseria meningitidis, Pseudomonas aeruginosa, Candida  albicans, C. glabrata, C krusei, C parapsilosis,  C. tropicalis and the KPC resistance gene.   Growth in aerobic and anaerobic bottles: Gram Negative Rods 10-29 @ 19:03      Rapid RVP Result: NotDetec (10-29 @ 15:30)    CENTRAL LINE: N  BERMUDEZ: N       (Pt is ESRD)                  A-LINE: N                           GLOBAL ISSUE/BEST PRACTICE  Analgesia: yes  Sedation: no  HOB elevation: yes  Stress ulcer prophylaxis: yes  VTE prophylaxis: yes  Glycemic control: yes  Nutrition: Clear liquid diet     CODE STATUS: Full  GOC discussion: Y

## 2018-11-01 NOTE — PROGRESS NOTE ADULT - ASSESSMENT
Patient is now day #2 status post cholecystotomy tube for biliary sepsis. She had rapid atrial fibrillation on 10/30 and converted to sinus rhythm. She required pressor therapy which has now been discontinued. Her white blood count improved today remains hemodynamically stable. She has chronic renal failure on hemodialysis. Her elevated troponin level may represent a component of subendocardial ischemia without true atherothrombosis. These results are nonspecific in the setting of her kidney disease. Currently afebrile and arousable with no evidence of decompensated heart failure or active ischemic    Recommend:  Abx per ICU team  should be on antiplatelet therapy, ASA has been restarted  cont BB  If recurrent AF will consider amiodarone for the short term  hd per renal  DVT prophylaxis  monitor and replete lytes, keep K>4, Mg>2  Other cardiovascular workup will depend on clinical course.  All other workup per primary team  Will follow     The patient is at risk of abrupt decompensation.  35 minutes of critical care time was spent with this patient.

## 2018-11-01 NOTE — PROGRESS NOTE ADULT - SUBJECTIVE AND OBJECTIVE BOX
infectious diseases progress note:    IRENE TAYLOR is a 68y y. o. Female patient    Patient reports: having pain in abd and legs    ROS:    EYES:  Negative  blurry vision or double vision  GASTROINTESTINAL:  Negative for nausea, vomiting, diarrhea  -otherwise negative except for subjective    Allergies    No Known Drug Allergies  Purell (Rash)    Intolerances        ANTIBIOTICS/RELEVANT:  antimicrobials  ertapenem  IVPB      ertapenem  IVPB 500 milliGRAM(s) IV Intermittent once    immunologic:    OTHER:  acetaminophen   Tablet .. 650 milliGRAM(s) Oral every 6 hours PRN  aspirin enteric coated 81 milliGRAM(s) Oral daily  calcium carbonate 1250 mG  + Vitamin D (OsCal 500 + D) 1 Tablet(s) Oral daily  dextrose 40% Gel 15 Gram(s) Oral once PRN  dextrose 5%. 1000 milliLiter(s) IV Continuous <Continuous>  dextrose 50% Injectable 12.5 Gram(s) IV Push once  dextrose 50% Injectable 25 Gram(s) IV Push once  dextrose 50% Injectable 25 Gram(s) IV Push once  ferrous    sulfate 325 milliGRAM(s) Oral three times a day  glucagon  Injectable 1 milliGRAM(s) IntraMuscular once PRN  heparin  Injectable 5000 Unit(s) SubCutaneous every 8 hours  HYDROmorphone  Injectable 0.25 milliGRAM(s) IV Push every 4 hours PRN  HYDROmorphone  Injectable 0.5 milliGRAM(s) IV Push every 4 hours PRN  insulin lispro (HumaLOG) corrective regimen sliding scale   SubCutaneous every 6 hours  metoclopramide Injectable 10 milliGRAM(s) IV Push every 8 hours  metoprolol tartrate 50 milliGRAM(s) Oral two times a day  pantoprazole  Injectable 40 milliGRAM(s) IV Push daily  simethicone 80 milliGRAM(s) Chew every 6 hours PRN  ursodiol Capsule 300 milliGRAM(s) Oral every 12 hours      Objective:  Last 24-Vital Signs Last 24 Hrs  T(C): 36.8 (01 Nov 2018 08:22), Max: 36.8 (31 Oct 2018 20:06)  T(F): 98.2 (01 Nov 2018 08:22), Max: 98.3 (31 Oct 2018 20:06)  HR: 72 (01 Nov 2018 09:00) (72 - 83)  BP: 157/115 (01 Nov 2018 09:00) (108/68 - 186/74)  BP(mean): 131 (01 Nov 2018 09:00) (81 - 131)  RR: 23 (01 Nov 2018 09:00) (11 - 35)  SpO2: 92% (01 Nov 2018 09:00) (89% - 100%)    T(C): 36.8 (11-01-18 @ 08:22), Max: 37.5 (10-31-18 @ 00:09)  T(F): 98.2 (11-01-18 @ 08:22), Max: 99.5 (10-31-18 @ 00:09)  T(C): 36.8 (11-01-18 @ 08:22), Max: 39.6 (10-29-18 @ 14:40)  T(F): 98.2 (11-01-18 @ 08:22), Max: 103.2 (10-29-18 @ 14:40)  T(C): 36.8 (11-01-18 @ 08:22), Max: 39.6 (10-29-18 @ 14:40)  T(F): 98.2 (11-01-18 @ 08:22), Max: 103.2 (10-29-18 @ 14:40)    PHYSICAL EXAM:  Constitutional: Well-developed, well nourished  Eyes: PERRLA, EOMI  Ear/Nose/Throat: oropharynx normal	  Neck: no JVD, no lymphadenopathy, supple  Respiratory: no accessory muscle use, lung fields bilaterally clear  Cardiovascular: RRR, normal S1, S2 no m/r/g  Gastrointestinal: soft, tender RUQ with drain in place, no HSM, BS-normal  Extremities: no clubbing, no cyanosis, edema in LE  Neuro: patient alert, oriented and appropriate  Skin: no sig lesions      LABS:                        10.2   15.58 )-----------( 147      ( 01 Nov 2018 05:33 )             35.2       WBC 15.58  11-01 @ 05:33  WBC 15.40  10-31 @ 07:08  WBC 24.63  10-30 @ 09:04  WBC 13.58  10-30 @ 05:49  WBC 23.96  10-29 @ 15:30  WBC 9.98  10-25 @ 13:45      11-01    138  |  100  |  52<H>  ----------------------------<  137<H>  4.7   |  28  |  5.40<H>    Ca    8.3<L>      01 Nov 2018 05:33  Phos  5.6     11-01  Mg     2.3     11-01    TPro  7.1  /  Alb  2.0<L>  /  TBili  3.9<H>  /  DBili  x   /  AST  118<H>  /  ALT  159<H>  /  AlkPhos  317<H>  11-01      Creatinine, Serum: 5.40 mg/dL (11-01-18 @ 05:33)  Creatinine, Serum: 4.10 mg/dL (10-31-18 @ 07:08)  Creatinine, Serum: 5.90 mg/dL (10-30-18 @ 07:37)  Creatinine, Serum: 6.00 mg/dL (10-30-18 @ 05:49)  Creatinine, Serum: 5.40 mg/dL (10-29-18 @ 15:30)      PT/INR - ( 01 Nov 2018 05:33 )   PT: 14.6 sec;   INR: 1.28 ratio         PTT - ( 01 Nov 2018 05:33 )  PTT:32.4 sec          MICROBIOLOGY:    Culture - Body Fluid with Gram Stain (10.30.18 @ 21:29)    Gram Stain:   No polymorphonuclear leukocytes seen per low power field  Numerous Gram Negative Rods seen per oil power field    Specimen Source: Bile Bile Fluid    Culture Results:   Numerous Escherichia coli  Moderate Alpha hemolytic strep "Susceptibilities not performed"    Culture - Blood (10.30.18 @ 10:47)    Gram Stain:   Growth in aerobic bottle: Gram Negative Rods    Specimen Source: .Blood Blood-Venous    Culture Results:   Growth in aerobic bottle: Escherichia coli ESBL  See previous culture [id number]      Culture - Blood (10.29.18 @ 19:03)    -  Gentamicin: S <=1    -  Imipenem: S <=1    -  Levofloxacin: R >4    -  Meropenem: S <=1    -  Piperacillin/Tazobactam: R <=8    -  Tobramycin: S <=2    -  Trimethoprim/Sulfamethoxazole: R >2/38    -  Escherichia coli: Detec    Gram Stain:   Growth in aerobic and anaerobic bottles: Gram Negative Rods    -  Amikacin: S <=8    -  Ampicillin: R >16 These ampicillin results predict results for amoxicillin    -  Ampicillin/Sulbactam: R >16/8    -  Aztreonam: R >16    -  Cefazolin: R >16    -  Cefepime: R >16    -  Cefoxitin: I 16    -  Ceftriaxone: R >32 Enterobacter, Citrobacter, and Serratia may develop resistance during prolonged therapy    -  Ciprofloxacin: R >2    -  Ertapenem: S <=0.5    Specimen Source: .Blood Blood-Peripheral    Organism: Blood Culture PCR    Organism: Escherichia coli ESBL    Culture Results:   Growth in aerobic and anaerobic bottles: Escherichia coli ESBL  "Due to technical problems, Proteus sp. will Not be reported as part of  the BCID panel until further notice"  ***Blood Panel PCR results on this specimen are available  approximately 3 hours after the Gram stain result.***  Gram stain, PCR, and/or culture results may not always  correspond due to difference in methodologies.  ************************************************************  This PCR assay was performed using [employer]  The following targets are tested for: Enterococcus,  vancomycin resistant enterococci, Listeria monocytogenes,  coagulase negative staphylococci, S. aureus,  methicillin resistant S. aureus, Streptococcus agalactiae  (Group B), S. pneumoniae, S. pyogenes (Group A),  Acinetobacter baumannii, Enterobacter cloacae, E. coli,  Klebsiella oxytoca, K. pneumoniae, Proteus sp.,  Serratia marcescens, Haemophilus influenzae,  Neisseria meningitidis, Pseudomonas aeruginosa, Candida  albicans, C. glabrata, C krusei, C parapsilosis,  C. tropicalis and the KPC resistance gene.    Organism Identification: Blood Culture PCR  Escherichia coli ESBL    Method Type: PCR    Method Type: KOREY        RADIOLOGY & ADDITIONAL STUDIES:

## 2018-11-01 NOTE — PROGRESS NOTE ADULT - SUBJECTIVE AND OBJECTIVE BOX
pt seen  c/o RUQ pain  vomiting overnight, NGT placed and pt removed  ICU Vital Signs Last 24 Hrs  T(C): 36.8 (01 Nov 2018 08:22), Max: 36.8 (31 Oct 2018 20:06)  T(F): 98.2 (01 Nov 2018 08:22), Max: 98.3 (31 Oct 2018 20:06)  HR: 75 (01 Nov 2018 08:00) (74 - 83)  BP: 173/75 (01 Nov 2018 08:00) (108/68 - 186/74)  BP(mean): 108 (01 Nov 2018 08:00) (81 - 111)  ABP: --  ABP(mean): --  RR: 24 (01 Nov 2018 08:00) (11 - 35)  SpO2: 92% (01 Nov 2018 08:00) (89% - 100%)  gen-NAD  resp-clear  abd-soft ND, RUQ Tenderness                            10.2   15.58 )-----------( 147      ( 01 Nov 2018 05:33 )             35.2   11-01    138  |  100  |  52<H>  ----------------------------<  137<H>  4.7   |  28  |  5.40<H>    Ca    8.3<L>      01 Nov 2018 05:33  Phos  5.6     11-01  Mg     2.3     11-01    TPro  7.1  /  Alb  2.0<L>  /  TBili  3.9<H>  /  DBili  x   /  AST  118<H>  /  ALT  159<H>  /  AlkPhos  317<H>  11-01

## 2018-11-01 NOTE — PROGRESS NOTE ADULT - SUBJECTIVE AND OBJECTIVE BOX
Patient is a 68y old  Female who presents with a chief complaint of Sepsis (31 Oct 2018 07:06)      Patient seen in follow up for ESRD on HD. s/p Perc Cholecystostomy. + blood cultures ESBL     PAST MEDICAL HISTORY:  ESRD (end stage renal disease) on dialysis  CAD (coronary artery disease)  Diabetes  CRF (chronic renal failure)  Hypertension  Pneumonia  Myocardial infarction  Hypertension  Diabetes    MEDICATIONS  (STANDING):  aspirin enteric coated 81 milliGRAM(s) Oral daily  calcium carbonate 1250 mG  + Vitamin D (OsCal 500 + D) 1 Tablet(s) Oral daily  clopidogrel Tablet 75 milliGRAM(s) Oral daily  dextrose 5%. 1000 milliLiter(s) (50 mL/Hr) IV Continuous <Continuous>  dextrose 50% Injectable 12.5 Gram(s) IV Push once  dextrose 50% Injectable 25 Gram(s) IV Push once  dextrose 50% Injectable 25 Gram(s) IV Push once  docusate sodium 100 milliGRAM(s) Oral three times a day  ertapenem  IVPB      ferrous    sulfate 325 milliGRAM(s) Oral three times a day  gabapentin 300 milliGRAM(s) Oral two times a day  heparin  Injectable 5000 Unit(s) SubCutaneous every 8 hours  insulin lispro (HumaLOG) corrective regimen sliding scale   SubCutaneous every 6 hours  isosorbide   mononitrate ER Tablet (IMDUR) 30 milliGRAM(s) Oral every 24 hours  metoclopramide Injectable 10 milliGRAM(s) IV Push every 8 hours  metoprolol tartrate 50 milliGRAM(s) Oral two times a day  NIFEdipine XL 60 milliGRAM(s) Oral daily  pantoprazole  Injectable 40 milliGRAM(s) IV Push daily  senna 2 Tablet(s) Oral at bedtime  ursodiol Capsule 300 milliGRAM(s) Oral every 12 hours    MEDICATIONS  (PRN):  acetaminophen   Tablet .. 650 milliGRAM(s) Oral every 6 hours PRN Mild Pain (1 - 3)  dextrose 40% Gel 15 Gram(s) Oral once PRN Blood Glucose LESS THAN 70 milliGRAM(s)/deciliter  glucagon  Injectable 1 milliGRAM(s) IntraMuscular once PRN Glucose LESS THAN 70 milligrams/deciliter  HYDROmorphone  Injectable 0.25 milliGRAM(s) IV Push every 4 hours PRN Moderate Pain (4 - 6)  HYDROmorphone  Injectable 0.5 milliGRAM(s) IV Push every 4 hours PRN Severe Pain (7 - 10)  simethicone 80 milliGRAM(s) Chew every 6 hours PRN abdominal bloating    T(C): 36.4 (11-01-18 @ 12:28), Max: 37.5 (10-31-18 @ 00:09)  HR: 69 (11-01-18 @ 13:00) (69 - 993)  BP: 173/74 (11-01-18 @ 13:00) (100/62 - 201/84)  RR: 19 (11-01-18 @ 13:00)  SpO2: 100% (11-01-18 @ 13:00)  Wt(kg): --  I&O's Detail    31 Oct 2018 07:01  -  01 Nov 2018 07:00  --------------------------------------------------------  IN:    Oral Fluid: 120 mL    Solution: 100 mL    Solution: 200 mL  Total IN: 420 mL    OUT:    Drain: 150 mL    Nasoenteral Tube: 500 mL  Total OUT: 650 mL    Total NET: -230 mL      01 Nov 2018 07:01  -  01 Nov 2018 14:12  --------------------------------------------------------  IN:    Oral Fluid: 220 mL  Total IN: 220 mL    OUT:  Total OUT: 0 mL    Total NET: 220 mL          PHYSICAL EXAM:  General: NAD, Rt chest dialysis catheter  Respiratory: b/l air entry  Cardiovascular: S1 S2  Gastrointestinal: soft  Extremities:  no edema                   LABORATORY:                        10.2   15.58 )-----------( 147      ( 01 Nov 2018 05:33 )             35.2     11-01    138  |  100  |  52<H>  ----------------------------<  137<H>  4.7   |  28  |  5.40<H>    Ca    8.3<L>      01 Nov 2018 05:33  Phos  5.6     11-01  Mg     2.3     11-01    TPro  7.1  /  Alb  2.0<L>  /  TBili  3.9<H>  /  DBili  x   /  AST  118<H>  /  ALT  159<H>  /  AlkPhos  317<H>  11-01    Sodium, Serum: 138 mmol/L (11-01 @ 05:33)  Sodium, Serum: 137 mmol/L (10-31 @ 07:08)    Potassium, Serum: 4.7 mmol/L (11-01 @ 05:33)  Potassium, Serum: 4.3 mmol/L (10-31 @ 07:08)    Hemoglobin: 10.2 g/dL (11-01 @ 05:33)  Hemoglobin: 10.3 g/dL (10-31 @ 07:08)  Hemoglobin: 11.1 g/dL (10-30 @ 09:04)  Hemoglobin: 10.8 g/dL (10-30 @ 05:49)    Creatinine, Serum 5.40 (11-01 @ 05:33)  Creatinine, Serum 4.10 (10-31 @ 07:08)  Creatinine, Serum 5.90 (10-30 @ 07:37)  Creatinine, Serum 6.00 (10-30 @ 05:49)    CARDIAC MARKERS ( 01 Nov 2018 05:33 )  .173 ng/mL / x     / 50 U/L / x     / 1.3 ng/mL  CARDIAC MARKERS ( 30 Oct 2018 22:08 )  .308 ng/mL / x     / 179 U/L / x     / 3.1 ng/mL      LIVER FUNCTIONS - ( 01 Nov 2018 05:33 )  Alb: 2.0 g/dL / Pro: 7.1 g/dL / ALK PHOS: 317 U/L / ALT: 159 U/L / AST: 118 U/L / GGT: x

## 2018-11-01 NOTE — PROGRESS NOTE ADULT - SUBJECTIVE AND OBJECTIVE BOX
Patient is a 68y old  Female who presents with a chief complaint of Sepsis (31 Oct 2018 16:04)      BRIEF HOSPITAL COURSE:   68y old female pmhx CAD s/p stent (2007), ESRD on HD (MWF; missed today's session), DM, HTN, HLD admitted with severe sepsis secondary to acute cholecystitis.  Patient hypotensive and in respiratory distress on 10/29, patient started on vasopressors and BiPAP.  Patient received cholecystostomy tube on 10/30.  Patient received dialysis s/p drain placement with 2L taken off.      Events last 24 hours:   Patient with intractable vomiting today despite multiple antiemetics.  Discussed with patient and her family about placing NG tube this evening to help decompress abdomen.  They agreed and NG tube was placed with ~500cc drown fluid drained.  Patient pulled NG tube around ~midnight and is refusing BIPAP.  Patient endorses general discomfort, nausea, vomiting.        PAST MEDICAL & SURGICAL HISTORY:  ESRD (end stage renal disease) on dialysis: MWF  CAD (coronary artery disease): s/p stent in 2007  Diabetes  Myocardial infarction  Hypertension  H/O: hysterectomy    Allergies  No Known Drug Allergies  Purell (Rash)      FAMILY HISTORY:  No pertinent family history in first degree relatives      Social History:  From home. No etoh, tobacco or illicit drug use.        Review of Systems:  All negative except for as noted in past 24 hour events.        Vitals During Exam:   HR: 78  BP: 131/59 mmHg  RR: 19  sPO2: 100%    Physical Examination:    General: Elderly, overweight female, lying in bed, appears uncomfortable.     HEENT: NC/AT, Pupils equal, reactive to light.  Symmetric. NC in place.       PULM: Symmetrical thorax expansion upon respiration.  Clear to auscultation bilaterally, no significant sputum production appreciated.      CVS: Regular rate and rhythm, no murmurs, rubs, or gallops appreciated. Right chest permacath appreciated.     ABD: Soft, distended, +mild tenderness, TENA drain in place, draining well, thin, brown bile fluid appreciated.  Normoactive bowel sounds, no masses appreciated.  +umbilical hernia that is reducible.  Exam limited secondary to distension and body habitus.      EXT: +1 pitting, nontender, DP pulses symmetrical.     SKIN: Warm and well perfused, no rashes noted.     NEURO: Alert, oriented, interactive, nonfocal, moving all 4 extremities.      Medications:  piperacillin/tazobactam IVPB. 3.375 Gram(s) IV Intermittent every 12 hours  metoprolol tartrate 50 milliGRAM(s) Oral two times a day  acetaminophen   Tablet .. 650 milliGRAM(s) Oral every 6 hours PRN  HYDROmorphone  Injectable 0.25 milliGRAM(s) IV Push every 4 hours PRN  HYDROmorphone  Injectable 0.5 milliGRAM(s) IV Push every 4 hours PRN  metoclopramide Injectable 10 milliGRAM(s) IV Push every 8 hours  aspirin enteric coated 81 milliGRAM(s) Oral daily  heparin  Injectable 5000 Unit(s) SubCutaneous every 8 hours  pantoprazole  Injectable 40 milliGRAM(s) IV Push daily  simethicone 80 milliGRAM(s) Chew every 6 hours PRN  ursodiol Capsule 300 milliGRAM(s) Oral every 12 hours  dextrose 40% Gel 15 Gram(s) Oral once PRN  dextrose 50% Injectable 12.5 Gram(s) IV Push once  dextrose 50% Injectable 25 Gram(s) IV Push once  dextrose 50% Injectable 25 Gram(s) IV Push once  glucagon  Injectable 1 milliGRAM(s) IntraMuscular once PRN  insulin lispro (HumaLOG) corrective regimen sliding scale   SubCutaneous every 6 hours  calcium carbonate 1250 mG  + Vitamin D (OsCal 500 + D) 1 Tablet(s) Oral daily  dextrose 5%. 1000 milliLiter(s) IV Continuous <Continuous>  ferrous    sulfate 325 milliGRAM(s) Oral three times a day  chlorhexidine 2% Cloths 1 Application(s) Topical daily  chlorhexidine 2% Cloths 1 Application(s) Topical daily      ICU Vital Signs Last 24 Hrs  T(C): 36.7 (31 Oct 2018 23:52), Max: 37.5 (31 Oct 2018 00:09)  T(F): 98 (31 Oct 2018 23:52), Max: 99.5 (31 Oct 2018 00:09)  HR: 74 (01 Nov 2018 00:00) (74 - 94)  BP: 131/59 (31 Oct 2018 23:00) (118/52 - 168/73)  BP(mean): 85 (31 Oct 2018 23:00) (81 - 106)  ABP: --  ABP(mean): --  RR: 20 (01 Nov 2018 00:00) (8 - 31)  SpO2: 100% (01 Nov 2018 00:00) (96% - 100%)    Vital Signs Last 24 Hrs  T(C): 36.7 (31 Oct 2018 23:52), Max: 37.5 (31 Oct 2018 00:09)  T(F): 98 (31 Oct 2018 23:52), Max: 99.5 (31 Oct 2018 00:09)  HR: 74 (01 Nov 2018 00:00) (74 - 94)  BP: 131/59 (31 Oct 2018 23:00) (118/52 - 168/73)  BP(mean): 85 (31 Oct 2018 23:00) (81 - 106)  RR: 20 (01 Nov 2018 00:00) (8 - 31)  SpO2: 100% (01 Nov 2018 00:00) (96% - 100%)    ABG - ( 30 Oct 2018 12:05 )  pH, Arterial: 7.27  pH, Blood: x     /  pCO2: 49    /  pO2: 117   / HCO3: 21    / Base Excess: -3.9  /  SaO2: 97          I&O's Detail    30 Oct 2018 07:01  -  31 Oct 2018 07:00  --------------------------------------------------------  IN:    norepinephrine Infusion: 122.8 mL    Oral Fluid: 60 mL    phenylephrine   Infusion: 112.4 mL    Plasma: 309 mL    Solution: 100 mL    Solution: 100 mL  Total IN: 804.2 mL    OUT:    Drain: 250 mL    Other: 2000 mL  Total OUT: 2250 mL  Total NET: -1445.8 mL      31 Oct 2018 07:01  -  01 Nov 2018 00:04  --------------------------------------------------------  IN:    Solution: 100 mL    Solution: 200 mL  Total IN: 300 mL    OUT:    Drain: 100 mL  Total OUT: 100 mL  Total NET: 200 mL      LABS:                        10.3   15.40 )-----------( 122      ( 31 Oct 2018 07:08 )             34.3     10-31    137  |  101  |  32<H>  ----------------------------<  130<H>  4.3   |  28  |  4.10<H>    Ca    8.2<L>      31 Oct 2018 07:08  Phos  4.0     10-31  Mg     2.0     10-31    TPro  6.8  /  Alb  2.1<L>  /  TBili  4.3<H>  /  DBili  x   /  AST  272<H>  /  ALT  208<H>  /  AlkPhos  262<H>  10-31      CARDIAC MARKERS ( 30 Oct 2018 22:08 )  .308 ng/mL / x     / 179 U/L / x     / 3.1 ng/mL  CARDIAC MARKERS ( 30 Oct 2018 13:31 )  .373 ng/mL / x     / 155 U/L / x     / 2.7 ng/mL  CARDIAC MARKERS ( 30 Oct 2018 09:04 )  .348 ng/mL / x     / 104 U/L / x     / 1.8 ng/mL      CAPILLARY BLOOD GLUCOSE  POCT Blood Glucose.: 141 mg/dL (31 Oct 2018 23:21)      PT/INR - ( 31 Oct 2018 07:08 )   PT: 18.7 sec;   INR: 1.62 ratio    PTT - ( 30 Oct 2018 05:49 )  PTT:34.1 sec      CULTURES:  Culture Results:   Numerous Escherichia coli  Moderate Alpha hemolytic strep "Susceptibilities not performed" (10-30 @ 21:29)  Culture Results:   Growth in aerobic and anaerobic bottles: Gram Negative Rods (10-30 @ 10:47)  Culture Results:   Growth in aerobic bottle: Gram Negative Rods (10-30 @ 10:47)  Culture Results:   No growth (10-29 @ 19:10)  Culture Results:   Growth in aerobic and anaerobic bottles: Escherichia coli  See previous culture [id number] (10-29 @ 19:05)  Culture Results:   Growth in aerobic and anaerobic bottles: Escherichia coli  "Due to technical problems, Proteus sp. will Not be reported as part of  the BCID panel until further notice"  ***Blood Panel PCR results on this specimen are available  approximately 3 hours after the Gram stain result.***  Gram stain, PCR, and/or culture results may not always  correspond due to difference in methodologies.  ************************************************************  This PCR assay was performed using [employer].  The following targets are tested for: Enterococcus,  vancomycin resistant enterococci, Listeria monocytogenes,  coagulase negative staphylococci, S. aureus,  methicillin resistant S. aureus, Streptococcus agalactiae  (Group B), S. pneumoniae, S.pyogenes (Group A),  Acinetobacter baumannii, Enterobacter cloacae, E. coli,  Klebsiella oxytoca, K. pneumoniae, Proteus sp.,  Serratia marcescens, Haemophilus influenzae,  Neisseria meningitidis, Pseudomonas aeruginosa, Candida  albicans, C. glabrata, C krusei, C parapsilosis,  C. tropicalis and the KPC resistance gene. (10-29 @ 19:03)      RADIOLOGY:   < from: IR Fluoroscopy <1 Hour (10.30.18 @ 15:40) >  INTERPRETATION: A right anterolateral approach was utilized. The bile was   foul-smelling and was a dark suarez brown color. Followup images showed   satisfactory coiling of the pigtail loop within the gallbladder lumen.    < end of copied text >    < from: Xray Chest 1 View-PORTABLE IMMEDIATE (10.30.18 @ 09:36) >  EXAM:  XR CHEST PORTABLE IMMED 1V                            PROCEDURE DATE:  10/30/2018      INTERPRETATION:  Status post central line insertion.    AP chest. Prior 10/29/2018.    Right dialysis catheter reidentified in situ. There has been interval   introduction of a left internal jugular line tip in the right atrium. No   pneumothorax. Low lung volumes. No change cardiomegaly vascular   congestion. No gross focal infiltrate or pleural effusion.    Impression: As above    < end of copied text >      SUPPLEMENTAL O2: NC, BiPAP prn  LINES: Peripheral IV, Right chest permacath  BERMUDEZ: N  PPx: Heparin, Pantoprazole   CONTACT: N

## 2018-11-01 NOTE — PROGRESS NOTE ADULT - ASSESSMENT
68y old female pmhx CAD s/p stent (2007), ESRD on HD (MWF; missed monday's session), DM2, HTN, HLD, anemia of chronic kidney disease admitted with severe sepsis secondary to acute cholecystitis.     Plan:    Neuro:  --tylenol 650g PO for mild pain PRN  --Dilaudid 0.25mg IV for moderate pain PRN  --Dilaudid 0.5mg IV for severe pain PRN    Cardio:  --hemodynamic monitoring  --metoprolol tartrate 50mg PO BID  --start ASA 81 mg PO qd  --hold plavix, imdur, nifedipine and crestor  --Trops trending down at .173   --Cardio consulted, input appreciated   --Reviewed EKG (10/31): SR with RBBB    Resp:  --Acute respiratory failure resolved  --supplemental O2 with nasal cannula  --Bipap prn    GI:   --CT: distended gallbladder with calcified stones, pericholecystic edema, and gallbladder wall thickening consistent with acute cholecystitis. Common duct dilated up to 1.5 cm with questionable distal common duct stone.  --MRCP: acute cholecystitis with a 2.5 cm stone at the gallbladder neck and 13 mm common bile duct without evidence for choledocholithiasis.  --Perc drainage on 10/30 for acute cholecystitis  --Bile Fluid Gram Stain (10/30): no PMN leukocytes seen. Numerous gram negative rods seen.  --FU GI, may need ERCP  --FU surgery  --NPO   --WBC improving 23.96 --> 13.58 --> 15.4 -->15.58, continue to trend  --bandemia: 30% --> 31%  --alk phos trending up: 262 --> 317. continue to trend  --LFTs trending down:  --> 295 --> 272 -->118,  --> 164 -->208 -->159. continue to trend  --Tbili trending down: 4.3-->3.9. continue to trend  --amylase and lipase decreased at 19 and 51, respectively.  --lactic acidosis resolved  --ferrous sulfate 325mg PO TID  --calcium carbonate + vit D 1 tablet PO daily  --ursodiol 300mg PO BID  --reglan 10mg IV q8h for nausea and vomiting. QT checked on ECG    :  --ESRD on HD  --HD yesterday with fluid removal, will continue with TTS dialysis schedule this week  --Next week back to regular [facility] dialysis schedule   --Nephrology Dr. Albarran consulted    Heme:  --H/H trending down at 10.3/34.3 -->10.2/35.2. Continue to trend  --epogen as needed for anemia --as per nephro    Endo:  --DM2 on insulin  --humalog sliding scale subQ q6h, holding lantus. Goal -180  --hypoglycemic protocol  -- on admission, improving at 130. Continue to monitor blood sugar levels    ID:  --sepsis likely 2/2 cholecystitis with acute cholelithiasis     --leukocytosis with bandemia, lactic acidosis, transaminitis, elevated alk phos 317 with Tbili 3.9  --febrile upon admission at 103F, has been afebrile this morning   --continue zosyn 3.375g q12h for acute kevin/ascending cholangitis  --blood culture (10/29), (10/30): gram negative rods  --Repeat cultures ordered     PPx:  --heparin 5000units subQ q8h for DVT ppx  --Protonix 40mg IV qd    Tubes & lines:  --R subclavian permacath 68y old female pmhx CAD s/p stent (2007), ESRD on HD (MWF; missed monday's session), DM2, HTN, HLD, anemia of chronic kidney disease admitted with severe sepsis secondary to acute cholecystitis, cholangitis requiring IR guided drainage     Plan:    Neuro:  --tylenol 650g PO for mild pain PRN  --Dilaudid 0.25mg IV for moderate pain PRN  --Dilaudid 0.5mg IV for severe pain PRN    Cardio:  --hemodynamic monitoring  --metoprolol tartrate 50mg PO BID  --start ASA 81 mg PO qd  --hold plavix, imdur, nifedipine and crestor  --Trops trending down at .173   --Cardio consulted, input appreciated   --Reviewed EKG (10/31): SR with RBBB    Resp:  --Acute respiratory failure resolved  --supplemental O2 with nasal cannula  --Bipap prn    GI:   --CT: distended gallbladder with calcified stones, pericholecystic edema, and gallbladder wall thickening consistent with acute cholecystitis. Common duct dilated up to 1.5 cm with questionable distal common duct stone.  --MRCP: acute cholecystitis with a 2.5 cm stone at the gallbladder neck and 13 mm common bile duct without evidence for choledocholithiasis.  --Perc drainage on 10/30 for acute cholecystitis  --Bile Fluid Gram Stain (10/30): no PMN leukocytes seen. Numerous gram negative rods seen.  --FU GI, may need ERCP  --FU surgery recs  --NPO pending surgery/ GI recs   --WBC improving 23.96 --> 13.58 --> 15.4 -->15.58, continue to trend  --bandemia: 30% --> 31%  --alk phos trending up: 262 --> 317. continue to trend  --LFTs trending down:  --> 295 --> 272 -->118,  --> 164 -->208 -->159. continue to trend  --Tbili trending down: 4.3-->3.9. continue to trend  --amylase and lipase decreased at 19 and 51, respectively.  --lactic acidosis resolved  --ferrous sulfate 325mg PO TID. Hold today dose due to N/V  --calcium carbonate + vit D 1 tablet PO daily  --ursodiol 300mg PO BID  --reglan 10mg IV q8h for nausea and vomiting. Repeat EKG today    :  --ESRD on HD  --HD yesterday with fluid removal, will continue with TTS dialysis schedule this week  --Next week back to regular MWF dialysis schedule   --Nephrology Dr. Albarran consulted    Heme:  --H/H trending down at 10.3/34.3 -->10.2/35.2. Continue to trend  --epogen as needed for anemia --as per nephro    Endo:  --DM2 on insulin  --humalog sliding scale subQ q6h, holding lantus. Goal -180  --hypoglycemic protocol  --BG stable, flowsheet reviewed     ID:  --sepsis likely 2/2 cholecystitis with acute cholelithiasis     --leukocytosis with bandemia, lactic acidosis, transaminitis, elevated alk phos 317 with Tbili 3.9  --Afebrile in past 24 hrs   --continue zosyn 3.375g q12h for acute kevin/ascending cholangitis  --blood culture (10/29), (10/30): gram negative rods  --Repeat cultures pending     PPx:  --heparin 5000units subQ q8h for DVT ppx  --Protonix 40mg IV qd    Tubes & lines:  --R subclavian permacath 68y old female pmhx CAD s/p stent (2007), ESRD on HD MWF, DM2, HTN, HLD, anemia of chronic kidney disease admitted with severe sepsis secondary to acute cholecystitis, cholangitis requiring IR guided drainage     Plan:    Neuro:  --tylenol 650g PO for mild pain PRN  --Dilaudid 0.25mg IV for moderate pain PRN  --Dilaudid 0.5mg IV for severe pain PRN  --Patient complained of bilateral calf and leg  pain this morning. Lower extremity US negative for DVT. As per family, pain has been a chronic issue and patient is on gabapentin at home. Will restart home dose as patient can tolerate PO meds     Cardio:  --metoprolol tartrate 50mg PO BID  --On ASA 81 mg PO qd  --Started plavix, imdur, nifedipine today as she can tolerate PO intake  -- Hold Crestor due to LFT's will reassess tomorrow   --Trops trending down at .173   --Cardio consulted, input appreciated   --Daily EKG, QT improved today     Resp:  --Acute respiratory failure resolved  --Not currently requiring supplemental O2  --Bipap, NC prn    GI:  --CT: distended gallbladder with calcified stones, pericholecystic edema, and gallbladder wall thickening consistent with acute cholecystitis. Common duct dilated up to 1.5 cm with questionable distal common duct stone.  --MRCP: acute cholecystitis with a 2.5 cm stone at the gallbladder neck and 13 mm common bile duct without evidence for choledocholithiasis.  --Perc drainage on 10/30 for acute cholecystitis  --Bile Fluid Gram Stain (10/30): no PMN leukocytes seen. Numerous gram negative rods seen.  --FU GI, no plan for ERCP  --FU surgery recs, KUB ordered for today  --Started on clear liquid diet, advance as tolerated  --WBC improving continue to trend  --bandemia: 30% --> 31%  --alk phos trending up: 262 --> 317. Continue to trend  --LFTs improving continue to trend  --Tbili trending down: 4.3-->3.9. continue to trend  --amylase and lipase improved  --lactic acidosis resolved  --ferrous sulfate 325mg PO TID. Hold dose today due to N/V  --calcium carbonate + vit D 1 tablet PO daily  --ursodiol 300mg PO BID  --reglan 10mg IV q8h for nausea and vomiting. Repeat EKG today    :  --ESRD on HD  --HD today, continue MWF schedule from next week   --Nephrology Dr. Albarran consulted    Heme:  --H/H stable  --epogen as needed for anemia --as per nephro    Endo:  --DM2 on insulin  --humalog sliding scale subQ q6h, holding lantus. Goal -180  --hypoglycemic protocol  --BG stable, flowsheet reviewed     ID:  --sepsis likely 2/2 cholecystitis with acute cholelithiasis     --leukocytosis with bandemia, lactic acidosis, transaminitis, elevated alk phos 317 with Tbili 3.9  --Afebrile in past 24 hrs   --Cultures positive for ESBL E coli, started on Ertapenem today, first dose given today. D/c Zosyn     PPx:  --heparin 5000units subQ q8h for DVT ppx  --Protonix 40mg IV qd 68y old female pmhx CAD s/p stent (2007), ESRD on HD MWF, DM2, HTN, HLD, anemia of chronic kidney disease admitted with severe sepsis secondary to acute cholecystitis, cholangitis requiring IR guided drainage     Plan:    Neuro:  --tylenol 650g PO for mild pain PRN  --Dilaudid 0.25mg IV for moderate pain PRN  --Dilaudid 0.5mg IV for severe pain PRN  --Patient complained of bilateral calf and leg pain this morning. Lower extremity US negative for DVT. As per family, pain has been a chronic issue and patient is on gabapentin at home. Will restart home dose as patient can tolerate PO meds     Cardio:  --metoprolol tartrate 50mg PO BID  --On ASA 81 mg PO qd  --Started plavix 75 mg PO qd  --started imdur 60mg & 30mg PO qd   --started nifedipine 60mg PO qd   --Hold Crestor due to LFT's will reassess tomorrow   --Trops trending down at .173   --Cardio consulted, input appreciated   --Daily EKG, QT improved today     Resp:  --Acute respiratory failure resolved  --Not currently requiring supplemental O2  --Bipap, NC prn    GI:  --CT: distended gallbladder with calcified stones, pericholecystic edema, and gallbladder wall thickening consistent with acute cholecystitis. Common duct dilated up to 1.5 cm with questionable distal common duct stone.  --MRCP: acute cholecystitis with a 2.5 cm stone at the gallbladder neck and 13 mm common bile duct without evidence for choledocholithiasis.  --Perc drainage on 10/30 for acute cholecystitis  --Bile Fluid Gram Stain (10/30): no PMN leukocytes seen. Numerous gram negative rods seen.  --FU GI, no plan for ERCP  --FU surgery recs, KUB ordered for today  --Started on clear liquid diet, advance as tolerated  --WBC improving continue to trend  --bandemia: 30% --> 31%  --alk phos trending up: 262 --> 317. Continue to trend  --LFTs improving continue to trend  --Tbili trending down: 4.3-->3.9. continue to trend  --amylase and lipase improved  --lactic acidosis resolved  --ferrous sulfate 325mg PO TID. Hold dose today due to N/V  --calcium carbonate + vit D 1 tablet PO daily  --ursodiol 300mg PO BID  --reglan 10mg IV q8h for nausea and vomiting. Repeat EKG today  --started bowel regimen with colace 100mg PO TID, senna 2 tablets PO bedtime  --simethicone 80mg q6h PRN for abdominal bloating  --FU ammonia level  --FU blood and bile cultures    :  --ESRD on HD  --HD today, continue MWF schedule from next week   --Nephrology Dr. Albarran consulted    Heme:  --H/H stable  --epogen as needed for anemia --as per nephro    Endo:  --DM2 on insulin  --humalog sliding scale subQ q6h, holding lantus. Goal -180  --hypoglycemic protocol  --BG stable, flowsheet reviewed     ID:  --sepsis likely 2/2 cholecystitis with acute cholelithiasis     --leukocytosis with bandemia, lactic acidosis, transaminitis, elevated alk phos 317 with Tbili 3.9  --Afebrile in past 24 hrs   --Cultures positive for ESBL E coli, started on Ertapenem today, first dose given today. D/c Zosyn     PPx:  --heparin 5000units subQ q8h for DVT ppx  --Protonix 40mg IV qd

## 2018-11-02 LAB
ALBUMIN SERPL ELPH-MCNC: 1.9 G/DL — LOW (ref 3.3–5)
ALP SERPL-CCNC: 358 U/L — HIGH (ref 40–120)
ALT FLD-CCNC: 101 U/L — HIGH (ref 12–78)
AMMONIA BLD-MCNC: <17 UMOL/L — SIGNIFICANT CHANGE UP (ref 11–32)
ANION GAP SERPL CALC-SCNC: 10 MMOL/L — SIGNIFICANT CHANGE UP (ref 5–17)
AST SERPL-CCNC: 49 U/L — HIGH (ref 15–37)
BASE EXCESS BLDA CALC-SCNC: 0.3 MMOL/L — SIGNIFICANT CHANGE UP (ref -2–2)
BASOPHILS # BLD AUTO: 0.02 K/UL — SIGNIFICANT CHANGE UP (ref 0–0.2)
BASOPHILS NFR BLD AUTO: 0.2 % — SIGNIFICANT CHANGE UP (ref 0–2)
BILIRUB SERPL-MCNC: 2.9 MG/DL — HIGH (ref 0.2–1.2)
BLOOD GAS COMMENTS ARTERIAL: SIGNIFICANT CHANGE UP
BUN SERPL-MCNC: 33 MG/DL — HIGH (ref 7–23)
CALCIUM SERPL-MCNC: 8.5 MG/DL — SIGNIFICANT CHANGE UP (ref 8.5–10.1)
CHLORIDE SERPL-SCNC: 97 MMOL/L — SIGNIFICANT CHANGE UP (ref 96–108)
CO2 SERPL-SCNC: 28 MMOL/L — SIGNIFICANT CHANGE UP (ref 22–31)
CREAT SERPL-MCNC: 4 MG/DL — HIGH (ref 0.5–1.3)
CULTURE RESULTS: SIGNIFICANT CHANGE UP
CULTURE RESULTS: SIGNIFICANT CHANGE UP
EOSINOPHIL # BLD AUTO: 0.07 K/UL — SIGNIFICANT CHANGE UP (ref 0–0.5)
EOSINOPHIL NFR BLD AUTO: 0.6 % — SIGNIFICANT CHANGE UP (ref 0–6)
GLUCOSE SERPL-MCNC: 159 MG/DL — HIGH (ref 70–99)
HCO3 BLDA-SCNC: 24 MMOL/L — SIGNIFICANT CHANGE UP (ref 23–27)
HCT VFR BLD CALC: 35.3 % — SIGNIFICANT CHANGE UP (ref 34.5–45)
HGB BLD-MCNC: 10.6 G/DL — LOW (ref 11.5–15.5)
HOROWITZ INDEX BLDA+IHG-RTO: SIGNIFICANT CHANGE UP
IMM GRANULOCYTES NFR BLD AUTO: 1.2 % — SIGNIFICANT CHANGE UP (ref 0–1.5)
INR BLD: 1.17 RATIO — HIGH (ref 0.88–1.16)
LYMPHOCYTES # BLD AUTO: 1.22 K/UL — SIGNIFICANT CHANGE UP (ref 1–3.3)
LYMPHOCYTES # BLD AUTO: 9.9 % — LOW (ref 13–44)
MAGNESIUM SERPL-MCNC: 2.2 MG/DL — SIGNIFICANT CHANGE UP (ref 1.6–2.6)
MCHC RBC-ENTMCNC: 26.8 PG — LOW (ref 27–34)
MCHC RBC-ENTMCNC: 30 GM/DL — LOW (ref 32–36)
MCV RBC AUTO: 89.1 FL — SIGNIFICANT CHANGE UP (ref 80–100)
MONOCYTES # BLD AUTO: 1.03 K/UL — HIGH (ref 0–0.9)
MONOCYTES NFR BLD AUTO: 8.4 % — SIGNIFICANT CHANGE UP (ref 2–14)
NEUTROPHILS # BLD AUTO: 9.83 K/UL — HIGH (ref 1.8–7.4)
NEUTROPHILS NFR BLD AUTO: 79.7 % — HIGH (ref 43–77)
PCO2 BLDA: 51 MMHG — HIGH (ref 32–46)
PH BLDA: 7.32 — LOW (ref 7.35–7.45)
PHOSPHATE SERPL-MCNC: 4.2 MG/DL — SIGNIFICANT CHANGE UP (ref 2.5–4.5)
PLATELET # BLD AUTO: 127 K/UL — LOW (ref 150–400)
PO2 BLDA: 79 MMHG — SIGNIFICANT CHANGE UP (ref 74–108)
POTASSIUM SERPL-MCNC: 4 MMOL/L — SIGNIFICANT CHANGE UP (ref 3.5–5.3)
POTASSIUM SERPL-SCNC: 4 MMOL/L — SIGNIFICANT CHANGE UP (ref 3.5–5.3)
PROT SERPL-MCNC: 7 G/DL — SIGNIFICANT CHANGE UP (ref 6–8.3)
PROTHROM AB SERPL-ACNC: 13.4 SEC — HIGH (ref 10–12.9)
RBC # BLD: 3.96 M/UL — SIGNIFICANT CHANGE UP (ref 3.8–5.2)
RBC # FLD: 17.1 % — HIGH (ref 10.3–14.5)
SAO2 % BLDA: 93 % — SIGNIFICANT CHANGE UP (ref 92–96)
SODIUM SERPL-SCNC: 135 MMOL/L — SIGNIFICANT CHANGE UP (ref 135–145)
SPECIMEN SOURCE: SIGNIFICANT CHANGE UP
SPECIMEN SOURCE: SIGNIFICANT CHANGE UP
WBC # BLD: 12.32 K/UL — HIGH (ref 3.8–10.5)
WBC # FLD AUTO: 12.32 K/UL — HIGH (ref 3.8–10.5)

## 2018-11-02 PROCEDURE — 74018 RADEX ABDOMEN 1 VIEW: CPT | Mod: 26

## 2018-11-02 PROCEDURE — 93010 ELECTROCARDIOGRAM REPORT: CPT

## 2018-11-02 PROCEDURE — 99291 CRITICAL CARE FIRST HOUR: CPT

## 2018-11-02 PROCEDURE — 99233 SBSQ HOSP IP/OBS HIGH 50: CPT

## 2018-11-02 PROCEDURE — 99233 SBSQ HOSP IP/OBS HIGH 50: CPT | Mod: GC

## 2018-11-02 RX ORDER — POLYETHYLENE GLYCOL 3350 17 G/17G
17 POWDER, FOR SOLUTION ORAL ONCE
Qty: 0 | Refills: 0 | Status: COMPLETED | OUTPATIENT
Start: 2018-11-02 | End: 2018-11-02

## 2018-11-02 RX ORDER — INSULIN LISPRO 100/ML
VIAL (ML) SUBCUTANEOUS
Qty: 0 | Refills: 0 | Status: DISCONTINUED | OUTPATIENT
Start: 2018-11-02 | End: 2018-11-16

## 2018-11-02 RX ORDER — ISOSORBIDE MONONITRATE 60 MG/1
60 TABLET, EXTENDED RELEASE ORAL
Qty: 0 | Refills: 0 | Status: DISCONTINUED | OUTPATIENT
Start: 2018-11-02 | End: 2018-11-16

## 2018-11-02 RX ORDER — GABAPENTIN 400 MG/1
300 CAPSULE ORAL DAILY
Qty: 0 | Refills: 0 | Status: DISCONTINUED | OUTPATIENT
Start: 2018-11-03 | End: 2018-11-16

## 2018-11-02 RX ORDER — ERYTHROMYCIN ETHYLSUCCINATE 400 MG
250 TABLET ORAL EVERY 6 HOURS
Qty: 0 | Refills: 0 | Status: DISCONTINUED | OUTPATIENT
Start: 2018-11-02 | End: 2018-11-03

## 2018-11-02 RX ADMIN — Medication 50 MILLIGRAM(S): at 05:08

## 2018-11-02 RX ADMIN — Medication 2: at 08:54

## 2018-11-02 RX ADMIN — Medication 10 MILLIGRAM(S): at 05:08

## 2018-11-02 RX ADMIN — Medication 1 TABLET(S): at 12:42

## 2018-11-02 RX ADMIN — CLOPIDOGREL BISULFATE 75 MILLIGRAM(S): 75 TABLET, FILM COATED ORAL at 12:42

## 2018-11-02 RX ADMIN — HEPARIN SODIUM 5000 UNIT(S): 5000 INJECTION INTRAVENOUS; SUBCUTANEOUS at 13:51

## 2018-11-02 RX ADMIN — Medication 2: at 17:58

## 2018-11-02 RX ADMIN — HYDROMORPHONE HYDROCHLORIDE 0.5 MILLIGRAM(S): 2 INJECTION INTRAMUSCULAR; INTRAVENOUS; SUBCUTANEOUS at 03:02

## 2018-11-02 RX ADMIN — Medication 60 MILLIGRAM(S): at 13:51

## 2018-11-02 RX ADMIN — HYDROMORPHONE HYDROCHLORIDE 0.5 MILLIGRAM(S): 2 INJECTION INTRAMUSCULAR; INTRAVENOUS; SUBCUTANEOUS at 03:15

## 2018-11-02 RX ADMIN — Medication 2: at 22:18

## 2018-11-02 RX ADMIN — POLYETHYLENE GLYCOL 3350 17 GRAM(S): 17 POWDER, FOR SOLUTION ORAL at 12:42

## 2018-11-02 RX ADMIN — HEPARIN SODIUM 5000 UNIT(S): 5000 INJECTION INTRAVENOUS; SUBCUTANEOUS at 05:08

## 2018-11-02 RX ADMIN — HEPARIN SODIUM 5000 UNIT(S): 5000 INJECTION INTRAVENOUS; SUBCUTANEOUS at 22:18

## 2018-11-02 RX ADMIN — GABAPENTIN 300 MILLIGRAM(S): 400 CAPSULE ORAL at 05:08

## 2018-11-02 RX ADMIN — Medication 81 MILLIGRAM(S): at 12:46

## 2018-11-02 RX ADMIN — Medication 325 MILLIGRAM(S): at 05:08

## 2018-11-02 RX ADMIN — Medication 100 MILLIGRAM(S): at 05:08

## 2018-11-02 RX ADMIN — Medication 100 MILLIGRAM(S): at 13:51

## 2018-11-02 RX ADMIN — URSODIOL 300 MILLIGRAM(S): 250 TABLET, FILM COATED ORAL at 17:57

## 2018-11-02 RX ADMIN — Medication 250 MILLIGRAM(S): at 17:57

## 2018-11-02 RX ADMIN — URSODIOL 300 MILLIGRAM(S): 250 TABLET, FILM COATED ORAL at 05:08

## 2018-11-02 RX ADMIN — Medication 325 MILLIGRAM(S): at 13:51

## 2018-11-02 RX ADMIN — Medication 50 MILLIGRAM(S): at 17:57

## 2018-11-02 RX ADMIN — ERTAPENEM SODIUM 100 MILLIGRAM(S): 1 INJECTION, POWDER, LYOPHILIZED, FOR SOLUTION INTRAMUSCULAR; INTRAVENOUS at 08:54

## 2018-11-02 RX ADMIN — Medication 250 MILLIGRAM(S): at 12:46

## 2018-11-02 RX ADMIN — ISOSORBIDE MONONITRATE 30 MILLIGRAM(S): 60 TABLET, EXTENDED RELEASE ORAL at 17:57

## 2018-11-02 NOTE — PROGRESS NOTE ADULT - SUBJECTIVE AND OBJECTIVE BOX
Patient is a 68y old  Female who presents with a chief complaint of Sepsis (02 Nov 2018 16:13)    24 hour events: Chart/labs reviewed. Pt seen and examined at bedside. No acute events    PAST MEDICAL & SURGICAL HISTORY:  ESRD (end stage renal disease) on dialysis: MWF  CAD (coronary artery disease): s/p stent in 2007  Diabetes  Myocardial infarction  Hypertension  H/O: hysterectomy      Review of Systems:  Unable to obtain    T(F): 98.2 (11-02-18 @ 19:22), Max: 99.2 (11-02-18 @ 15:40)  HR: 71 (11-02-18 @ 20:00) (67 - 88)  BP: 140/65 (11-02-18 @ 20:00) (98/49 - 150/69)  RR: 23 (11-02-18 @ 20:00) (15 - 34)  SpO2: 91% (11-02-18 @ 20:00) (91% - 100%)  Wt(kg): --        CAPILLARY BLOOD GLUCOSE      POCT Blood Glucose.: 167 mg/dL (02 Nov 2018 17:44)      I&O's Summary    01 Nov 2018 07:01  -  02 Nov 2018 07:00  --------------------------------------------------------  IN: 570 mL / OUT: 2350 mL / NET: -1780 mL    02 Nov 2018 07:01  -  02 Nov 2018 20:27  --------------------------------------------------------  IN: 286 mL / OUT: 50 mL / NET: 236 mL        Physical Exam:     Gen: WD/WG eldery female  Neuro: lethargic, easily arousable  HEENT: NC/AT  Resp: CTA b/l  CVS: nl S1/S2, RRR  Abd: +tenderness, hypoactive BS, perc-kevin drain with dark bilious draingage  Ext: no edema, +pulses  Skin: well perfused, warm      Meds:    ertapenem  IVPB   ertapenem  IVPB IV Intermittent  erythromycin     base Tablet Oral  isosorbide   mononitrate ER Tablet (IMDUR) Oral  isosorbide   mononitrate ER Tablet (IMDUR) Oral  metoprolol tartrate Oral  NIFEdipine XL Oral  insulin lispro (HumaLOG) corrective regimen sliding scale SubCutaneous  acetaminophen   Tablet .. Oral PRN  HYDROmorphone  Injectable IV Push PRN  HYDROmorphone  Injectable IV Push PRN  aspirin enteric coated Oral  clopidogrel Tablet Oral  heparin  Injectable SubCutaneous  docusate sodium Oral  senna Oral  simethicone Chew PRN  ursodiol Capsule Oral  calcium carbonate 1250 mG  + Vitamin D (OsCal 500 + D) Oral  dextrose 5%. IV Continuous  ferrous    sulfate Oral                                  10.6   12.32 )-----------( 127      ( 02 Nov 2018 08:01 )             35.3       11-02    135  |  97  |  33<H>  ----------------------------<  159<H>  4.0   |  28  |  4.00<H>    Ca    8.5      02 Nov 2018 08:01  Phos  4.2     11-02  Mg     2.2     11-02    TPro  7.0  /  Alb  1.9<L>  /  TBili  2.9<H>  /  DBili  x   /  AST  49<H>  /  ALT  101<H>  /  AlkPhos  358<H>  11-02      CARDIAC MARKERS ( 01 Nov 2018 05:33 )  .173 ng/mL / x     / 50 U/L / x     / 1.3 ng/mL      PT/INR - ( 02 Nov 2018 08:01 )   PT: 13.4 sec;   INR: 1.17 ratio         PTT - ( 01 Nov 2018 05:33 )  PTT:32.4 sec    .Body Fluid Bile Fluid   Rare Gram Negative Rods   No polymorphonuclear cells seen  No organisms seen  by cytocentrifuge 11-01 @ 21:29  .Blood Blood-Peripheral   No growth to date. -- 11-01 @ 08:35  Bile Bile Fluid   Numerous Escherichia coli  Moderate Alpha hemolytic strep "Susceptibilities not performed"   No polymorphonuclear leukocytes seen per low power field  Numerous Gram Negative Rods seen per oil power field 10-30 @ 21:29  .Blood Blood-Peripheral   Growth in aerobic and anaerobic bottles: Escherichia coli ESBL  See previous culture [id number]   Growth in aerobic and anaerobic bottles: Gram Negative Rods 10-30 @ 10:47  .Urine Clean Catch (Midstream)   No growth -- 10-29 @ 19:10  .Blood Blood-Peripheral   Growth in aerobic and anaerobic bottles: Escherichia coli ESBL  See previous culture [id number]   Growth in aerobic and anaerobic bottles: Gram Negative Rods 10-29 @ 19:05  .Blood Blood-Peripheral   Growth in aerobic and anaerobic bottles: Escherichia coli ESBL  "Due to technical problems, Proteus sp. will Not be reported as part of  the BCID panel until further notice"  ***Blood Panel PCR results on this specimen are available  approximately 3 hours after the Gram stain result.***  Gram stain, PCR, and/or culture results may not always  correspond due to difference in methodologies.  ************************************************************  This PCR assay was performed using [employer].  The following targets are tested for: Enterococcus,  vancomycin resistant enterococci, Listeria monocytogenes,  coagulase negative staphylococci, S. aureus,  methicillin resistant S. aureus, Streptococcus agalactiae  (Group B), S. pneumoniae, S. pyogenes (Group A),  Acinetobacter baumannii, Enterobacter cloacae, E. coli,  Klebsiella oxytoca, K. pneumoniae, Proteus sp.,  Serratia marcescens, Haemophilus influenzae,  Neisseria meningitidis, Pseudomonas aeruginosa, Candida  albicans, C. glabrata, C krusei, C parapsilosis,  C. tropicalis and the KPC resistance gene.   Growth in aerobic and anaerobic bottles: Gram Negative Rods 10-29 @ 19:03      Rapid RVP Result: NotDetec (10-29 @ 15:30)    Xray Abd:  FINDINGS:     Moderate gastric distention is essentially unchanged. No small bowel or   colonic distention is seen. A cholecystostomy tube is again evident. A   central venous catheter is redemonstrated terminating in the region of right   atrium. There is evidence of multilevel thoracolumbar spondylosis.       IMPRESSION:     No significant change in the appearance of gastric distention.         CENTRAL LINE: Y/N          DATE INSERTED:              REMOVE: Y/N    BERMUDEZ: Y/N                        DATE INSERTED:              REMOVE: Y/N    A-LINE: Y/N                       DATE INSERTED:              REMOVE: Y/N    GLOBAL ISSUE/BEST PRACTICE:  Analgesia: yes  Sedation: no  HOB elevation: yes  Stress ulcer prophylaxis: yes  VTE prophylaxis: yes  Glycemic control: yes  Nutrition: yes      CODE STATUS: Full  GOC discussion: Y  Critical care time spent (mins): 31  (Reviewing data, imaging, discussing with multidisciplinary team, non inclusive of procedures, discussing goals of care with patient/family)

## 2018-11-02 NOTE — PROGRESS NOTE ADULT - SUBJECTIVE AND OBJECTIVE BOX
Patient is a 68y old  Female who presents with a chief complaint of Sepsis (30 Oct 2018 13:42)      INTERVAL HPI/OVERNIGHT EVENTS: Pt seen and examined bedside, has no complaints. pt is sleeping with DIL bed side. pt was confused yesterday and again today, still NPO.     MEDICATIONS  (STANDING):  aspirin enteric coated 81 milliGRAM(s) Oral daily  calcium carbonate 1250 mG  + Vitamin D (OsCal 500 + D) 1 Tablet(s) Oral daily  clopidogrel Tablet 75 milliGRAM(s) Oral daily  dextrose 5%. 1000 milliLiter(s) (50 mL/Hr) IV Continuous <Continuous>  dextrose 50% Injectable 12.5 Gram(s) IV Push once  dextrose 50% Injectable 25 Gram(s) IV Push once  dextrose 50% Injectable 25 Gram(s) IV Push once  docusate sodium 100 milliGRAM(s) Oral three times a day  ertapenem  IVPB      ertapenem  IVPB 500 milliGRAM(s) IV Intermittent every 24 hours  erythromycin     base Tablet 250 milliGRAM(s) Oral every 6 hours  ferrous    sulfate 325 milliGRAM(s) Oral three times a day  heparin  Injectable 5000 Unit(s) SubCutaneous every 8 hours  insulin lispro (HumaLOG) corrective regimen sliding scale   SubCutaneous Before meals and at bedtime  isosorbide   mononitrate ER Tablet (IMDUR) 60 milliGRAM(s) Oral every 24 hours  isosorbide   mononitrate ER Tablet (IMDUR) 30 milliGRAM(s) Oral every 24 hours  metoprolol tartrate 50 milliGRAM(s) Oral two times a day  NIFEdipine XL 60 milliGRAM(s) Oral daily  senna 2 Tablet(s) Oral at bedtime  ursodiol Capsule 300 milliGRAM(s) Oral every 12 hours    MEDICATIONS  (PRN):  acetaminophen   Tablet .. 650 milliGRAM(s) Oral every 6 hours PRN Mild Pain (1 - 3)  dextrose 40% Gel 15 Gram(s) Oral once PRN Blood Glucose LESS THAN 70 milliGRAM(s)/deciliter  glucagon  Injectable 1 milliGRAM(s) IntraMuscular once PRN Glucose LESS THAN 70 milligrams/deciliter  HYDROmorphone  Injectable 0.25 milliGRAM(s) IV Push every 4 hours PRN Moderate Pain (4 - 6)  HYDROmorphone  Injectable 0.5 milliGRAM(s) IV Push every 4 hours PRN Severe Pain (7 - 10)  simethicone 80 milliGRAM(s) Chew every 6 hours PRN abdominal bloating      REVIEW OF SYSTEMS  Constitutional: Fatigue; No fever, chills   Neuro: No headache, numbness, weakness  Resp:  No Cough, no wheezing; dyspnea on exertion   CVS: No chest pain, palpitations, leg swelling   GI: +abdominal pain, nausea, vomiting, diarrhea   : No dysuria, frequency, incontinence  Skin: No itching, burning, rashes, or lesions   Msk: No weakness, joint pain, RLE pain  Psych: No depression, anxiety, mood swings    Vital Signs Last 24 Hrs  T(C): 37.2 (02 Nov 2018 16:00), Max: 37.3 (02 Nov 2018 15:40)  T(F): 98.9 (02 Nov 2018 16:00), Max: 99.2 (02 Nov 2018 15:40)  HR: 71 (02 Nov 2018 14:00) (67 - 102)  BP: 130/69 (02 Nov 2018 14:00) (97/50 - 150/69)  BP(mean): 96 (02 Nov 2018 14:00) (70 - 121)  RR: 19 (02 Nov 2018 14:00) (15 - 34)  SpO2: 95% (02 Nov 2018 14:00) (93% - 100%)    PHYSICAL EXAM:  GENERAL: NAD. on O2 nc  HEENT:  EOMI, moist mucous membranes  CHEST/LUNG:  decreased bs b/l, no rales, wheezes, or rhonchi  HEART:  RRR, S1, S2  ABDOMEN:  BS+, soft, mildly tender, nondistended, with drain having serosanguinous and dark bile fluid  EXTREMITIES: no edema, cyanosis, or calf tenderness  NERVOUS SYSTEM: AA&Ox3    LABS:                        10.6   12.32 )-----------( 127      ( 02 Nov 2018 08:01 )             35.3     02 Nov 2018 08:01    135    |  97     |  33     ----------------------------<  159    4.0     |  28     |  4.00     Ca    8.5        02 Nov 2018 08:01  Phos  4.2       02 Nov 2018 08:01  Mg     2.2       02 Nov 2018 08:01    TPro  7.0    /  Alb  1.9    /  TBili  2.9    /  DBili  x      /  AST  49     /  ALT  101    /  AlkPhos  358    02 Nov 2018 08:01    PT/INR - ( 02 Nov 2018 08:01 )   PT: 13.4 sec;   INR: 1.17 ratio         PTT - ( 01 Nov 2018 05:33 )  PTT:32.4 sec    CAPILLARY BLOOD GLUCOSE      POCT Blood Glucose.: 137 mg/dL (02 Nov 2018 12:26)  POCT Blood Glucose.: 165 mg/dL (02 Nov 2018 08:48)  POCT Blood Glucose.: 119 mg/dL (01 Nov 2018 23:55)  POCT Blood Glucose.: 104 mg/dL (01 Nov 2018 18:15)    UCx       RADIOLOGY & ADDITIONAL TESTS:    Culture - Blood (collected 10-29-18 @ 19:05)  Source: .Blood Blood-Peripheral  Gram Stain (10-30-18 @ 03:51):    Growth in aerobic and anaerobic bottles: Gram Negative Rods  Preliminary Report (10-30-18 @ 03:51):    Growth in aerobic and anaerobic bottles: Gram Negative Rods    Culture - Blood (collected 10-29-18 @ 19:03)  Source: .Blood Blood-Peripheral  Gram Stain (10-30-18 @ 03:49):    Growth in aerobic and anaerobic bottles: Gram Negative Rods  Preliminary Report (10-30-18 @ 03:49):    Growth in aerobic and anaerobic bottles: Gram Negative Rods    "Due to technical problems, Proteus sp. will Not be reported as part of    the BCID panel until further notice"    ***Blood Panel PCR results on this specimen are available    approximately 3 hours after the Gram stain result.***    Gram stain, PCR, and/or culture results may not always    correspond due to difference in methodologies.    ************************************************************    This PCR assay was performed using [employer].    The following targets are tested for: Enterococcus,    vancomycin resistant enterococci, Listeria monocytogenes,    coagulase negative staphylococci, S. aureus,    methicillin resistant S. aureus, Streptococcus agalactiae    (Group B), S. pneumoniae, S. pyogenes (Group A),    Acinetobacter baumannii, Enterobacter cloacae, E. coli,    Klebsiella oxytoca, K. pneumoniae, Proteus sp.,    Serratia marcescens, Haemophilus influenzae,    Neisseria meningitidis, Pseudomonas aeruginosa, Candida    albicans, C. glabrata, C krusei, C parapsilosis,    C. tropicalis and the KPC resistance gene.  Organism: Blood Culture PCR (10-30-18 @ 05:12)  Organism: Blood Culture PCR (10-30-18 @ 05:12)      -  Escherichia coli: Detec      Method Type: PCR        RADIOLOGY & ADDITIONAL TESTS:    Personally reviewed.     Consultant(s) Notes Reviewed:  [x] YES  [ ] NO    Care Discussed with [x] Consultants  [x] Patient  [ ] Family  [ ]      [ ] Other; RN  DVT ppx

## 2018-11-02 NOTE — PROGRESS NOTE ADULT - PROBLEM SELECTOR PLAN 1
Anticipate 7 days of effective therapy past date of documented negative blood cultures and local site control so until 11/8

## 2018-11-02 NOTE — PROGRESS NOTE ADULT - SUBJECTIVE AND OBJECTIVE BOX
Patient is a 68y old  Female who presents with a chief complaint of Sepsis (01 Nov 2018 15:48)    24 hour events: Nausea/ vomiting resolved. Blood cultures positive for ESBL E coli. Had dialysis yesterday, tolerated well. No events overnight and no acute complaints this morning.      REVIEW OF SYSTEMS  Unable to obtain meaningful ROS due to mental status    T(F): 97.9 (11-02-18 @ 07:37), Max: 98.7 (11-01-18 @ 23:57)  HR: 71 (11-02-18 @ 07:00) (67 - 102)  BP: 124/59 (11-02-18 @ 07:00) (97/50 - 201/84)  RR: 22 (11-02-18 @ 07:00) (8 - 33)  SpO2: 97% (11-02-18 @ 07:00) (92% - 100%)  Wt(kg): --      CAPILLARY BLOOD GLUCOSE      POCT Blood Glucose.: 119 mg/dL (01 Nov 2018 23:55)  POCT Blood Glucose.: 104 mg/dL (01 Nov 2018 18:15)  POCT Blood Glucose.: 107 mg/dL (01 Nov 2018 11:52)    I&O's Summary    11-01 @ 07:01  -  11-02 @ 07:00  --------------------------------------------------------  IN: 570 mL / OUT: 2350 mL / NET: -1780 mL      PHYSICAL EXAM  General: Awake.   HEENT: NCAT, moist mucosa,   Resp: Non-labored. lungs cta b/l, dec breasth sounds at bases, no w/r/r.  CVS: RRR. S1 and S2 noted. no m/r/g.   Abd: BS+, soft, mildly tender, nondistended, with drain having serosanguinous and dark fluid  EXTREMITIES: no edema, cyanosis, or calf tenderness  NERVOUS SYSTEM: AA&Ox3      MEDICATIONS  ertapenem  IVPB   ertapenem  IVPB IV Intermittent  isosorbide   mononitrate ER Tablet (IMDUR) Oral  isosorbide   mononitrate ER Tablet (IMDUR) Oral  metoprolol tartrate Oral  NIFEdipine XL Oral  dextrose 40% Gel Oral PRN  dextrose 50% Injectable IV Push  dextrose 50% Injectable IV Push  dextrose 50% Injectable IV Push  glucagon  Injectable IntraMuscular PRN  insulin lispro (HumaLOG) corrective regimen sliding scale SubCutaneous  acetaminophen   Tablet .. Oral PRN  gabapentin Oral  HYDROmorphone  Injectable IV Push PRN  HYDROmorphone  Injectable IV Push PRN  metoclopramide Injectable IV Push  aspirin enteric coated Oral  clopidogrel Tablet Oral  heparin  Injectable SubCutaneous  docusate sodium Oral  senna Oral  simethicone Chew PRN  ursodiol Capsule Oral  calcium carbonate 1250 mG  + Vitamin D (OsCal 500 + D) Oral  dextrose 5%. IV Continuous  ferrous    sulfate Oral                                10.2   15.58 )-----------( 147      ( 01 Nov 2018 05:33 )             35.2       11-01    138  |  100  |  52<H>  ----------------------------<  137<H>  4.7   |  28  |  5.40<H>    Ca    8.3<L>      01 Nov 2018 05:33  Phos  5.6     11-01  Mg     2.3     11-01    TPro  7.1  /  Alb  2.0<L>  /  TBili  3.9<H>  /  DBili  x   /  AST  118<H>  /  ALT  159<H>  /  AlkPhos  317<H>  11-01      CARDIAC MARKERS ( 01 Nov 2018 05:33 )  .173 ng/mL / x     / 50 U/L / x     / 1.3 ng/mL      PT/INR - ( 01 Nov 2018 05:33 )   PT: 14.6 sec;   INR: 1.28 ratio         PTT - ( 01 Nov 2018 05:33 )  PTT:32.4 sec    .Body Fluid Bile Fluid --   No polymorphonuclear cells seen  No organisms seen  by cytocentrifuge 11-01 @ 21:29  Bile Bile Fluid   Numerous Escherichia coli  Moderate Alpha hemolytic strep "Susceptibilities not performed"   No polymorphonuclear leukocytes seen per low power field  Numerous Gram Negative Rods seen per oil power field 10-30 @ 21:29  .Blood Blood-Peripheral   Growth in aerobic and anaerobic bottles: Escherichia coli ESBL  See previous culture [id number]   Growth in aerobic and anaerobic bottles: Gram Negative Rods 10-30 @ 10:47  .Urine Clean Catch (Midstream)   No growth -- 10-29 @ 19:10  .Blood Blood-Peripheral   Growth in aerobic and anaerobic bottles: Escherichia coli ESBL  See previous culture [id number]   Growth in aerobic and anaerobic bottles: Gram Negative Rods 10-29 @ 19:05  .Blood Blood-Peripheral   Growth in aerobic and anaerobic bottles: Escherichia coli ESBL  "Due to technical problems, Proteus sp. will Not be reported as part of  the BCID panel until further notice"  ***Blood Panel PCR results on this specimen are available  approximately 3 hours after the Gram stain result.***  Gram stain, PCR, and/or culture results may not always  correspond due to difference in methodologies.  ************************************************************  This PCR assay was performed using [employer].  The following targets are tested for: Enterococcus,  vancomycin resistant enterococci, Listeria monocytogenes,  coagulase negative staphylococci, S. aureus,  methicillin resistant S. aureus, Streptococcus agalactiae  (Group B), S. pneumoniae, S. pyogenes (Group A),  Acinetobacter baumannii, Enterobacter cloacae, E. coli,  Klebsiella oxytoca, K. pneumoniae, Proteus sp.,  Serratia marcescens, Haemophilus influenzae,  Neisseria meningitidis, Pseudomonas aeruginosa, Candida  albicans, C. glabrata, C krusei, C parapsilosis,  C. tropicalis and the KPC resistance gene.   Growth in aerobic and anaerobic bottles: Gram Negative Rods 10-29 @ 19:03      Rapid RVP Result: NotDetec (10-29 @ 15:30)    Radiology: ***  Bedside lung ultrasound: ***  Bedside ECHO: ***    CENTRAL LINE: Y/N          DATE INSERTED:              REMOVE: Y/N  BERMUDEZ: Y/N                        DATE INSERTED:              REMOVE: Y/N  A-LINE: Y/N                       DATE INSERTED:              REMOVE: Y/N    GLOBAL ISSUE/BEST PRACTICE  Analgesia:   Sedation:   CAM-ICU:   HOB elevation: yes  Stress ulcer prophylaxis:   VTE prophylaxis:   Glycemic control:   Nutrition:     CODE STATUS: ***  [place] discussion: Y Patient is a 68y old  Female who presents with a chief complaint of Sepsis (01 Nov 2018 15:48)    24 hour events: Nausea/ vomiting resolved. Blood cultures positive for ESBL E coli. Had dialysis yesterday, tolerated well. No events overnight and no acute complaints this morning. Patient was noted to have labored breathing this morning and was sleepy but arousable.      REVIEW OF SYSTEMS  Unable to obtain meaningful ROS due to mental status    T(F): 97.9 (11-02-18 @ 07:37), Max: 98.7 (11-01-18 @ 23:57)  HR: 71 (11-02-18 @ 07:00) (67 - 102)  BP: 124/59 (11-02-18 @ 07:00) (97/50 - 201/84)  RR: 22 (11-02-18 @ 07:00) (8 - 33)  SpO2: 97% (11-02-18 @ 07:00) (92% - 100%)  Wt(kg): --      CAPILLARY BLOOD GLUCOSE      POCT Blood Glucose.: 119 mg/dL (01 Nov 2018 23:55)  POCT Blood Glucose.: 104 mg/dL (01 Nov 2018 18:15)  POCT Blood Glucose.: 107 mg/dL (01 Nov 2018 11:52)    I&O's Summary    11-01 @ 07:01  -  11-02 @ 07:00  --------------------------------------------------------  IN: 570 mL / OUT: 2350 mL / NET: -1780 mL      PHYSICAL EXAM  General: Awake, sleepy but arousable,  HEENT: NCAT, moist mucosa,   Resp: Non-labored. lungs cta b/l, dec breasth sounds at bases, no w/r/r.  CVS: RRR. S1 and S2 noted. no m/r/g.   Abd: BS+, soft, mildly tender, nondistended, with drain having serosanguinous and dark bilious fluid  EXTREMITIES: no edema, cyanosis, or calf tenderness  NERVOUS SYSTEM: AA&Ox3, Sensation intact       MEDICATIONS  ertapenem  IVPB   ertapenem  IVPB IV Intermittent  isosorbide   mononitrate ER Tablet (IMDUR) Oral  isosorbide   mononitrate ER Tablet (IMDUR) Oral  metoprolol tartrate Oral  NIFEdipine XL Oral  dextrose 40% Gel Oral PRN  dextrose 50% Injectable IV Push  dextrose 50% Injectable IV Push  dextrose 50% Injectable IV Push  glucagon  Injectable IntraMuscular PRN  insulin lispro (HumaLOG) corrective regimen sliding scale SubCutaneous  acetaminophen   Tablet .. Oral PRN  gabapentin Oral  HYDROmorphone  Injectable IV Push PRN  HYDROmorphone  Injectable IV Push PRN  metoclopramide Injectable IV Push  aspirin enteric coated Oral  clopidogrel Tablet Oral  heparin  Injectable SubCutaneous  docusate sodium Oral  senna Oral  simethicone Chew PRN  ursodiol Capsule Oral  calcium carbonate 1250 mG  + Vitamin D (OsCal 500 + D) Oral  dextrose 5%. IV Continuous  ferrous    sulfate Oral                                10.2   15.58 )-----------( 147      ( 01 Nov 2018 05:33 )             35.2       11-01    138  |  100  |  52<H>  ----------------------------<  137<H>  4.7   |  28  |  5.40<H>    Ca    8.3<L>      01 Nov 2018 05:33  Phos  5.6     11-01  Mg     2.3     11-01    TPro  7.1  /  Alb  2.0<L>  /  TBili  3.9<H>  /  DBili  x   /  AST  118<H>  /  ALT  159<H>  /  AlkPhos  317<H>  11-01      CARDIAC MARKERS ( 01 Nov 2018 05:33 )  .173 ng/mL / x     / 50 U/L / x     / 1.3 ng/mL      PT/INR - ( 01 Nov 2018 05:33 )   PT: 14.6 sec;   INR: 1.28 ratio         PTT - ( 01 Nov 2018 05:33 )  PTT:32.4 sec    .Body Fluid Bile Fluid --   No polymorphonuclear cells seen  No organisms seen  by cytocentrifuge 11-01 @ 21:29  Bile Bile Fluid   Numerous Escherichia coli  Moderate Alpha hemolytic strep "Susceptibilities not performed"   No polymorphonuclear leukocytes seen per low power field  Numerous Gram Negative Rods seen per oil power field 10-30 @ 21:29  .Blood Blood-Peripheral   Growth in aerobic and anaerobic bottles: Escherichia coli ESBL  See previous culture [id number]   Growth in aerobic and anaerobic bottles: Gram Negative Rods 10-30 @ 10:47  .Urine Clean Catch (Midstream)   No growth -- 10-29 @ 19:10  .Blood Blood-Peripheral   Growth in aerobic and anaerobic bottles: Escherichia coli ESBL  See previous culture [id number]   Growth in aerobic and anaerobic bottles: Gram Negative Rods 10-29 @ 19:05  .Blood Blood-Peripheral   Growth in aerobic and anaerobic bottles: Escherichia coli ESBL  "Due to technical problems, Proteus sp. will Not be reported as part of  the BCID panel until further notice"  ***Blood Panel PCR results on this specimen are available  approximately 3 hours after the Gram stain result.***  Gram stain, PCR, and/or culture results may not always  correspond due to difference in methodologies.  ************************************************************  This PCR assay was performed using [employer].  The following targets are tested for: Enterococcus,  vancomycin resistant enterococci, Listeria monocytogenes,  coagulase negative staphylococci, S. aureus,  methicillin resistant S. aureus, Streptococcus agalactiae  (Group B), S. pneumoniae, S. pyogenes (Group A),  Acinetobacter baumannii, Enterobacter cloacae, E. coli,  Klebsiella oxytoca, K. pneumoniae, Proteus sp.,  Serratia marcescens, Haemophilus influenzae,  Neisseria meningitidis, Pseudomonas aeruginosa, Candida  albicans, C. glabrata, C krusei, C parapsilosis,  C. tropicalis and the KPC resistance gene.   Growth in aerobic and anaerobic bottles: Gram Negative Rods 10-29 @ 19:03      Rapid RVP Result: NotDetec (10-29 @ 15:30)      CENTRAL LINE: N  BERMUDEZ: N       (Pt is ESRD)                  A-LINE: N                           GLOBAL ISSUE/BEST PRACTICE  Analgesia: yes  Sedation: no  HOB elevation: yes  Stress ulcer prophylaxis: yes  VTE prophylaxis: yes  Glycemic control: yes  Nutrition: Clear liquid diet     CODE STATUS: Full  [facility] discussion: Y Patient is a 68y old  Female who presents with a chief complaint of Sepsis (01 Nov 2018 15:48)    24 hour events: Nausea/ vomiting resolved. Blood cultures positive for ESBL E coli. Had dialysis yesterday, tolerated well. No events overnight and no acute complaints this morning. Patient was noted to have labored breathing this morning and was sleepy but arousable. She admits poor sleep last night and frustration over having to stay in the hospital.      REVIEW OF SYSTEMS  Unable to obtain meaningful ROS due to mental status    T(F): 97.9 (11-02-18 @ 07:37), Max: 98.7 (11-01-18 @ 23:57)  HR: 71 (11-02-18 @ 07:00) (67 - 102)  BP: 124/59 (11-02-18 @ 07:00) (97/50 - 201/84)  RR: 22 (11-02-18 @ 07:00) (8 - 33)  SpO2: 97% (11-02-18 @ 07:00) (92% - 100%)  Wt(kg): --      CAPILLARY BLOOD GLUCOSE      POCT Blood Glucose.: 119 mg/dL (01 Nov 2018 23:55)  POCT Blood Glucose.: 104 mg/dL (01 Nov 2018 18:15)  POCT Blood Glucose.: 107 mg/dL (01 Nov 2018 11:52)    I&O's Summary    11-01 @ 07:01  -  11-02 @ 07:00  --------------------------------------------------------  IN: 570 mL / OUT: 2350 mL / NET: -1780 mL      PHYSICAL EXAM  General: Awake, sleepy but arousable,  HEENT: NCAT, moist mucosa,   Resp: Non-labored. lungs cta b/l, dec breasth sounds at bases, no w/r/r.  CVS: RRR. S1 and S2 noted. no m/r/g.   Abd: BS+, soft, mildly tender, nondistended, with drain having serosanguinous and dark bilious fluid  EXTREMITIES: no edema, cyanosis, or calf tenderness  NERVOUS SYSTEM: AA&Ox3, Sensation intact       MEDICATIONS  ertapenem  IVPB   ertapenem  IVPB IV Intermittent  isosorbide   mononitrate ER Tablet (IMDUR) Oral  isosorbide   mononitrate ER Tablet (IMDUR) Oral  metoprolol tartrate Oral  NIFEdipine XL Oral  dextrose 40% Gel Oral PRN  dextrose 50% Injectable IV Push  dextrose 50% Injectable IV Push  dextrose 50% Injectable IV Push  glucagon  Injectable IntraMuscular PRN  insulin lispro (HumaLOG) corrective regimen sliding scale SubCutaneous  acetaminophen   Tablet .. Oral PRN  gabapentin Oral  HYDROmorphone  Injectable IV Push PRN  HYDROmorphone  Injectable IV Push PRN  metoclopramide Injectable IV Push  aspirin enteric coated Oral  clopidogrel Tablet Oral  heparin  Injectable SubCutaneous  docusate sodium Oral  senna Oral  simethicone Chew PRN  ursodiol Capsule Oral  calcium carbonate 1250 mG  + Vitamin D (OsCal 500 + D) Oral  dextrose 5%. IV Continuous  ferrous    sulfate Oral                                10.2   15.58 )-----------( 147      ( 01 Nov 2018 05:33 )             35.2       11-01    138  |  100  |  52<H>  ----------------------------<  137<H>  4.7   |  28  |  5.40<H>    Ca    8.3<L>      01 Nov 2018 05:33  Phos  5.6     11-01  Mg     2.3     11-01    TPro  7.1  /  Alb  2.0<L>  /  TBili  3.9<H>  /  DBili  x   /  AST  118<H>  /  ALT  159<H>  /  AlkPhos  317<H>  11-01      CARDIAC MARKERS ( 01 Nov 2018 05:33 )  .173 ng/mL / x     / 50 U/L / x     / 1.3 ng/mL      PT/INR - ( 01 Nov 2018 05:33 )   PT: 14.6 sec;   INR: 1.28 ratio         PTT - ( 01 Nov 2018 05:33 )  PTT:32.4 sec    .Body Fluid Bile Fluid --   No polymorphonuclear cells seen  No organisms seen  by cytocentrifuge 11-01 @ 21:29  Bile Bile Fluid   Numerous Escherichia coli  Moderate Alpha hemolytic strep "Susceptibilities not performed"   No polymorphonuclear leukocytes seen per low power field  Numerous Gram Negative Rods seen per oil power field 10-30 @ 21:29  .Blood Blood-Peripheral   Growth in aerobic and anaerobic bottles: Escherichia coli ESBL  See previous culture [id number]   Growth in aerobic and anaerobic bottles: Gram Negative Rods 10-30 @ 10:47  .Urine Clean Catch (Midstream)   No growth -- 10-29 @ 19:10  .Blood Blood-Peripheral   Growth in aerobic and anaerobic bottles: Escherichia coli ESBL  See previous culture [id number]   Growth in aerobic and anaerobic bottles: Gram Negative Rods 10-29 @ 19:05  .Blood Blood-Peripheral   Growth in aerobic and anaerobic bottles: Escherichia coli ESBL  "Due to technical problems, Proteus sp. will Not be reported as part of  the BCID panel until further notice"  ***Blood Panel PCR results on this specimen are available  approximately 3 hours after the Gram stain result.***  Gram stain, PCR, and/or culture results may not always  correspond due to difference in methodologies.  ************************************************************  This PCR assay was performed using [employer].  The following targets are tested for: Enterococcus,  vancomycin resistant enterococci, Listeria monocytogenes,  coagulase negative staphylococci, S. aureus,  methicillin resistant S. aureus, Streptococcus agalactiae  (Group B), S. pneumoniae, S. pyogenes (Group A),  Acinetobacter baumannii, Enterobacter cloacae, E. coli,  Klebsiella oxytoca, K. pneumoniae, Proteus sp.,  Serratia marcescens, Haemophilus influenzae,  Neisseria meningitidis, Pseudomonas aeruginosa, Candida  albicans, C. glabrata, C krusei, C parapsilosis,  C. tropicalis and the KPC resistance gene.   Growth in aerobic and anaerobic bottles: Gram Negative Rods 10-29 @ 19:03      Rapid RVP Result: NotDetec (10-29 @ 15:30)      CENTRAL LINE: N  BERMUDEZ: N       (Pt is ESRD)                  A-LINE: N                           GLOBAL ISSUE/BEST PRACTICE  Analgesia: yes  Sedation: no  HOB elevation: yes  Stress ulcer prophylaxis: yes  VTE prophylaxis: yes  Glycemic control: yes  Nutrition: Clear liquid diet     CODE STATUS: Full  [facility] discussion: Y Patient is a 68y old  Female who presents with a chief complaint of Sepsis (01 Nov 2018 15:48)    24 hour events: Nausea/ vomiting resolved. Blood cultures positive for ESBL E coli. Had dialysis yesterday, tolerated well. No events overnight and no acute complaints this morning. Patient was noted to have labored breathing this morning and was sleepy but arousable. She admits poor sleep last night and frustration over having to stay in the hospital.      REVIEW OF SYSTEMS  Unable to obtain meaningful ROS due to mental status    T(F): 97.9 (11-02-18 @ 07:37), Max: 98.7 (11-01-18 @ 23:57)  HR: 71 (11-02-18 @ 07:00) (67 - 102)  BP: 124/59 (11-02-18 @ 07:00) (97/50 - 201/84)  RR: 22 (11-02-18 @ 07:00) (8 - 33)  SpO2: 97% (11-02-18 @ 07:00) (92% - 100%)  Wt(kg): --      CAPILLARY BLOOD GLUCOSE      POCT Blood Glucose.: 119 mg/dL (01 Nov 2018 23:55)  POCT Blood Glucose.: 104 mg/dL (01 Nov 2018 18:15)  POCT Blood Glucose.: 107 mg/dL (01 Nov 2018 11:52)    I&O's Summary    11-01 @ 07:01  -  11-02 @ 07:00  --------------------------------------------------------  IN: 570 mL / OUT: 2350 mL / NET: -1780 mL      PHYSICAL EXAM  General: Awake, sleepy but arousable,  HEENT: NCAT, moist mucosa,   Resp: Non-labored. lungs cta b/l, dec breath sounds at bases, no w/r/r.  CVS: RRR. S1 and S2 noted. no m/r/g.   Abd: Feint bowel sounds, soft, mildly tender, mildly distended, with drain having serosanguinous and dark bilious fluid  EXTREMITIES: no edema, cyanosis, or calf tenderness  NERVOUS SYSTEM: AA&Ox3, Sensation intact       MEDICATIONS  ertapenem  IVPB   ertapenem  IVPB IV Intermittent  isosorbide   mononitrate ER Tablet (IMDUR) Oral  isosorbide   mononitrate ER Tablet (IMDUR) Oral  metoprolol tartrate Oral  NIFEdipine XL Oral  dextrose 40% Gel Oral PRN  dextrose 50% Injectable IV Push  dextrose 50% Injectable IV Push  dextrose 50% Injectable IV Push  glucagon  Injectable IntraMuscular PRN  insulin lispro (HumaLOG) corrective regimen sliding scale SubCutaneous  acetaminophen   Tablet .. Oral PRN  gabapentin Oral  HYDROmorphone  Injectable IV Push PRN  HYDROmorphone  Injectable IV Push PRN  metoclopramide Injectable IV Push  aspirin enteric coated Oral  clopidogrel Tablet Oral  heparin  Injectable SubCutaneous  docusate sodium Oral  senna Oral  simethicone Chew PRN  ursodiol Capsule Oral  calcium carbonate 1250 mG  + Vitamin D (OsCal 500 + D) Oral  dextrose 5%. IV Continuous  ferrous    sulfate Oral                                10.2   15.58 )-----------( 147      ( 01 Nov 2018 05:33 )             35.2       11-01    138  |  100  |  52<H>  ----------------------------<  137<H>  4.7   |  28  |  5.40<H>    Ca    8.3<L>      01 Nov 2018 05:33  Phos  5.6     11-01  Mg     2.3     11-01    TPro  7.1  /  Alb  2.0<L>  /  TBili  3.9<H>  /  DBili  x   /  AST  118<H>  /  ALT  159<H>  /  AlkPhos  317<H>  11-01      CARDIAC MARKERS ( 01 Nov 2018 05:33 )  .173 ng/mL / x     / 50 U/L / x     / 1.3 ng/mL      PT/INR - ( 01 Nov 2018 05:33 )   PT: 14.6 sec;   INR: 1.28 ratio         PTT - ( 01 Nov 2018 05:33 )  PTT:32.4 sec    .Body Fluid Bile Fluid --   No polymorphonuclear cells seen  No organisms seen  by cytocentrifuge 11-01 @ 21:29  Bile Bile Fluid   Numerous Escherichia coli  Moderate Alpha hemolytic strep "Susceptibilities not performed"   No polymorphonuclear leukocytes seen per low power field  Numerous Gram Negative Rods seen per oil power field 10-30 @ 21:29  .Blood Blood-Peripheral   Growth in aerobic and anaerobic bottles: Escherichia coli ESBL  See previous culture [id number]   Growth in aerobic and anaerobic bottles: Gram Negative Rods 10-30 @ 10:47  .Urine Clean Catch (Midstream)   No growth -- 10-29 @ 19:10  .Blood Blood-Peripheral   Growth in aerobic and anaerobic bottles: Escherichia coli ESBL  See previous culture [id number]   Growth in aerobic and anaerobic bottles: Gram Negative Rods 10-29 @ 19:05  .Blood Blood-Peripheral   Growth in aerobic and anaerobic bottles: Escherichia coli ESBL  "Due to technical problems, Proteus sp. will Not be reported as part of  the BCID panel until further notice"  ***Blood Panel PCR results on this specimen are available  approximately 3 hours after the Gram stain result.***  Gram stain, PCR, and/or culture results may not always  correspond due to difference in methodologies.  ************************************************************  This PCR assay was performed using [employer].  The following targets are tested for: Enterococcus,  vancomycin resistant enterococci, Listeria monocytogenes,  coagulase negative staphylococci, S. aureus,  methicillin resistant S. aureus, Streptococcus agalactiae  (Group B), S. pneumoniae, S. pyogenes (Group A),  Acinetobacter baumannii, Enterobacter cloacae, E. coli,  Klebsiella oxytoca, K. pneumoniae, Proteus sp.,  Serratia marcescens, Haemophilus influenzae,  Neisseria meningitidis, Pseudomonas aeruginosa, Candida  albicans, C. glabrata, C krusei, C parapsilosis,  C. tropicalis and the KPC resistance gene.   Growth in aerobic and anaerobic bottles: Gram Negative Rods 10-29 @ 19:03      Rapid RVP Result: NotDetec (10-29 @ 15:30)      CENTRAL LINE: N  BERMUDEZ: N       (Pt is ESRD)                  A-LINE: N                           GLOBAL ISSUE/BEST PRACTICE  Analgesia: yes  Sedation: no  HOB elevation: yes  Stress ulcer prophylaxis: yes  VTE prophylaxis: yes  Glycemic control: yes  Nutrition: Clear liquid diet     CODE STATUS: Full  [facility] discussion: Y

## 2018-11-02 NOTE — PROGRESS NOTE ADULT - SUBJECTIVE AND OBJECTIVE BOX
[facility] -- Glynn Bullock, Susan, Claudia, Morgan Sams Savella  Office # [phone]      Follow Up:  CAD, PAF    Subjective/Observations: Patient seen and examined. Events noted. Resting comfortably in bed. No complaints of chest pain,  or palpitations reported. No signs of orthopnea or PND.       REVIEW OF SYSTEMS: All other review of systems is negative unless indicated above    PAST MEDICAL & SURGICAL HISTORY:  ESRD (end stage renal disease) on dialysis: MWF  CAD (coronary artery disease): s/p stent in 2007  Diabetes  Myocardial infarction  Hypertension  H/O: hysterectomy      MEDICATIONS  (STANDING):  aspirin enteric coated 81 milliGRAM(s) Oral daily  calcium carbonate 1250 mG  + Vitamin D (OsCal 500 + D) 1 Tablet(s) Oral daily  clopidogrel Tablet 75 milliGRAM(s) Oral daily  dextrose 5%. 1000 milliLiter(s) (50 mL/Hr) IV Continuous <Continuous>  dextrose 50% Injectable 12.5 Gram(s) IV Push once  dextrose 50% Injectable 25 Gram(s) IV Push once  dextrose 50% Injectable 25 Gram(s) IV Push once  docusate sodium 100 milliGRAM(s) Oral three times a day  ertapenem  IVPB      ertapenem  IVPB 500 milliGRAM(s) IV Intermittent every 24 hours  ferrous    sulfate 325 milliGRAM(s) Oral three times a day  gabapentin 300 milliGRAM(s) Oral two times a day  heparin  Injectable 5000 Unit(s) SubCutaneous every 8 hours  insulin lispro (HumaLOG) corrective regimen sliding scale   SubCutaneous Before meals and at bedtime  isosorbide   mononitrate ER Tablet (IMDUR) 60 milliGRAM(s) Oral every 24 hours  isosorbide   mononitrate ER Tablet (IMDUR) 30 milliGRAM(s) Oral every 24 hours  metoclopramide Injectable 10 milliGRAM(s) IV Push every 8 hours  metoprolol tartrate 50 milliGRAM(s) Oral two times a day  NIFEdipine XL 60 milliGRAM(s) Oral daily  senna 2 Tablet(s) Oral at bedtime  ursodiol Capsule 300 milliGRAM(s) Oral every 12 hours    MEDICATIONS  (PRN):  acetaminophen   Tablet .. 650 milliGRAM(s) Oral every 6 hours PRN Mild Pain (1 - 3)  dextrose 40% Gel 15 Gram(s) Oral once PRN Blood Glucose LESS THAN 70 milliGRAM(s)/deciliter  glucagon  Injectable 1 milliGRAM(s) IntraMuscular once PRN Glucose LESS THAN 70 milligrams/deciliter  HYDROmorphone  Injectable 0.25 milliGRAM(s) IV Push every 4 hours PRN Moderate Pain (4 - 6)  HYDROmorphone  Injectable 0.5 milliGRAM(s) IV Push every 4 hours PRN Severe Pain (7 - 10)  simethicone 80 milliGRAM(s) Chew every 6 hours PRN abdominal bloating      Allergies    No Known Drug Allergies  Purell (Rash)    Intolerances            Vital Signs Last 24 Hrs  T(C): 36.6 (02 Nov 2018 07:37), Max: 37.1 (01 Nov 2018 23:57)  T(F): 97.9 (02 Nov 2018 07:37), Max: 98.7 (01 Nov 2018 23:57)  HR: 71 (02 Nov 2018 08:00) (67 - 102)  BP: 132/67 (02 Nov 2018 08:00) (97/50 - 201/84)  BP(mean): 87 (02 Nov 2018 08:00) (70 - 131)  RR: 15 (02 Nov 2018 08:00) (8 - 33)  SpO2: 99% (02 Nov 2018 08:00) (92% - 100%)    I&O's Summary    01 Nov 2018 07:01  -  02 Nov 2018 07:00  --------------------------------------------------------  IN: 570 mL / OUT: 2350 mL / NET: -1780 mL    02 Nov 2018 07:01  -  02 Nov 2018 08:35  --------------------------------------------------------  IN: 118 mL / OUT: 0 mL / NET: 118 mL          PHYSICAL EXAM:  TELE: SR  Constitutional: NAD, awake , appears tachypneic  HEENT: Moist Mucous Membranes, Anicteric  Pulmonary: Decreased breath sounds b/l. No rales, crackles or wheeze appreciated.   Cardiovascular: Regular, S1 and S2, No murmurs, rubs, gallops or clicks  Gastrointestinal: Bowel Sounds present, distended, nontender.   Lymph: trace peripheral edema. No lymphadenopathy.  Skin: No visible rashes or ulcers.  Psych:  Mood & affect appropriate for situation    LABS: All Labs Reviewed:                        10.6   12.32 )-----------( 127      ( 02 Nov 2018 08:01 )             35.3                         10.2   15.58 )-----------( 147      ( 01 Nov 2018 05:33 )             35.2                         10.3   15.40 )-----------( 122      ( 31 Oct 2018 07:08 )             34.3     02 Nov 2018 08:01    135    |  97     |  33     ----------------------------<  159    4.0     |  28     |  4.00   01 Nov 2018 05:33    138    |  100    |  52     ----------------------------<  137    4.7     |  28     |  5.40   31 Oct 2018 07:08    137    |  101    |  32     ----------------------------<  130    4.3     |  28     |  4.10     Ca    8.5        02 Nov 2018 08:01  Ca    8.3        01 Nov 2018 05:33  Ca    8.2        31 Oct 2018 07:08  Phos  4.2       02 Nov 2018 08:01  Phos  5.6       01 Nov 2018 05:33  Phos  4.0       31 Oct 2018 07:08  Mg     2.2       02 Nov 2018 08:01  Mg     2.3       01 Nov 2018 05:33  Mg     2.0       31 Oct 2018 07:08    TPro  7.0    /  Alb  1.9    /  TBili  2.9    /  DBili  x      /  AST  49     /  ALT  101    /  AlkPhos  358    02 Nov 2018 08:01  TPro  7.1    /  Alb  2.0    /  TBili  3.9    /  DBili  x      /  AST  118    /  ALT  159    /  AlkPhos  317    01 Nov 2018 05:33  TPro  6.8    /  Alb  2.1    /  TBili  4.3    /  DBili  x      /  AST  272    /  ALT  208    /  AlkPhos  262    31 Oct 2018 07:08    PT/INR - ( 02 Nov 2018 08:01 )   PT: 13.4 sec;   INR: 1.17 ratio         PTT - ( 01 Nov 2018 05:33 )  PTT:32.4 sec  CARDIAC MARKERS ( 01 Nov 2018 05:33 )  .173 ng/mL / x     / 50 U/L / x     / 1.3 ng/mL    < from: Xray Chest 1 View-PORTABLE IMMEDIATE (10.31.18 @ 21:52) >    EXAM:  XR CHEST PORTABLE IMMED 1V                            PROCEDURE DATE:  10/31/2018          INTERPRETATION:  Chest portable.    Clinical History: Nasogastric tube placement.    Comparison: 10/30/2018.    Single AP view submitted.    Patient is rotated.    Nasogastric tube is present, tip below the level of the hemidiaphragm.    Again noted is right-sided dialysis catheter.  The left-sided central venous catheter appears to been removed.    The evaluation of the cardiomediastinal silhouette is limited on portable   technique.    Low lung volumes are present.  Again noted is perihilar airspace consolidation, most pronounced in the   right.  There are probable small bilateral pleural effusions.    Impression:    Findings as discussed above.                      NOELLE COLEMAN M.D., ATTENDING RADIOLOGIST  This document has been electronically signed. Nov 1 2018  8:08AM                < end of copied text >

## 2018-11-02 NOTE — PROGRESS NOTE ADULT - PROBLEM SELECTOR PLAN 2
With this being an ESBL recommendation to treat with renally dosed ertapenem.    Thank you for consulting us and involving us in the management of this most interesting and challenging case.     Please Call with any further questions

## 2018-11-02 NOTE — PROGRESS NOTE ADULT - SUBJECTIVE AND OBJECTIVE BOX
infectious diseases progress note:    IRENE TAYLOR is a 68y y. o. Female patient    Patient with no concerning overnight events    Allergies    No Known Drug Allergies  Purell (Rash)    Intolerances        ANTIBIOTICS/RELEVANT:  antimicrobials  ertapenem  IVPB      ertapenem  IVPB 500 milliGRAM(s) IV Intermittent every 24 hours    immunologic:    OTHER:  acetaminophen   Tablet .. 650 milliGRAM(s) Oral every 6 hours PRN  aspirin enteric coated 81 milliGRAM(s) Oral daily  calcium carbonate 1250 mG  + Vitamin D (OsCal 500 + D) 1 Tablet(s) Oral daily  clopidogrel Tablet 75 milliGRAM(s) Oral daily  dextrose 40% Gel 15 Gram(s) Oral once PRN  dextrose 5%. 1000 milliLiter(s) IV Continuous <Continuous>  dextrose 50% Injectable 12.5 Gram(s) IV Push once  dextrose 50% Injectable 25 Gram(s) IV Push once  dextrose 50% Injectable 25 Gram(s) IV Push once  docusate sodium 100 milliGRAM(s) Oral three times a day  ferrous    sulfate 325 milliGRAM(s) Oral three times a day  gabapentin 300 milliGRAM(s) Oral two times a day  glucagon  Injectable 1 milliGRAM(s) IntraMuscular once PRN  heparin  Injectable 5000 Unit(s) SubCutaneous every 8 hours  HYDROmorphone  Injectable 0.25 milliGRAM(s) IV Push every 4 hours PRN  HYDROmorphone  Injectable 0.5 milliGRAM(s) IV Push every 4 hours PRN  insulin lispro (HumaLOG) corrective regimen sliding scale   SubCutaneous Before meals and at bedtime  isosorbide   mononitrate ER Tablet (IMDUR) 60 milliGRAM(s) Oral every 24 hours  isosorbide   mononitrate ER Tablet (IMDUR) 30 milliGRAM(s) Oral every 24 hours  metoclopramide Injectable 10 milliGRAM(s) IV Push every 8 hours  metoprolol tartrate 50 milliGRAM(s) Oral two times a day  NIFEdipine XL 60 milliGRAM(s) Oral daily  senna 2 Tablet(s) Oral at bedtime  simethicone 80 milliGRAM(s) Chew every 6 hours PRN  ursodiol Capsule 300 milliGRAM(s) Oral every 12 hours      Objective:  Vital Signs Last 24 Hrs  T(C): 36.6 (02 Nov 2018 07:37), Max: 37.1 (01 Nov 2018 23:57)  T(F): 97.9 (02 Nov 2018 07:37), Max: 98.7 (01 Nov 2018 23:57)  HR: 71 (02 Nov 2018 08:00) (67 - 102)  BP: 132/67 (02 Nov 2018 08:00) (97/50 - 201/84)  BP(mean): 87 (02 Nov 2018 08:00) (70 - 120)  RR: 15 (02 Nov 2018 08:00) (8 - 33)  SpO2: 99% (02 Nov 2018 08:00) (92% - 100%)    T(C): 36.6 (11-02-18 @ 07:37), Max: 37.1 (11-01-18 @ 23:57)  T(C): 36.6 (11-02-18 @ 07:37), Max: 37.5 (10-31-18 @ 00:09)  T(C): 36.6 (11-02-18 @ 07:37), Max: 39.6 (10-29-18 @ 14:40)    PHYSICAL EXAM:  Constitutional: Well-developed, well nourished  Eyes: PERRLA, EOMI  Ear/Nose/Throat: oropharynx normal	  Neck: no JVD, no lymphadenopathy, supple  Respiratory: no accessory muscle use  Cardiovascular: RRR,   Gastrointestinal: soft, NT  Extremities: no clubbing, no cyanosis, edema absent      LABS:                        10.6   12.32 )-----------( 127      ( 02 Nov 2018 08:01 )             35.3       12.32 11-02 @ 08:01  15.58 11-01 @ 05:33  15.40 10-31 @ 07:08  24.63 10-30 @ 09:04  13.58 10-30 @ 05:49  23.96 10-29 @ 15:30      11-02    135  |  97  |  33<H>  ----------------------------<  159<H>  4.0   |  28  |  4.00<H>    Ca    8.5      02 Nov 2018 08:01  Phos  4.2     11-02  Mg     2.2     11-02    TPro  7.0  /  Alb  1.9<L>  /  TBili  2.9<H>  /  DBili  x   /  AST  49<H>  /  ALT  101<H>  /  AlkPhos  358<H>  11-02      Creatinine, Serum: 4.00 mg/dL (11-02-18 @ 08:01)  Creatinine, Serum: 5.40 mg/dL (11-01-18 @ 05:33)  Creatinine, Serum: 4.10 mg/dL (10-31-18 @ 07:08)  Creatinine, Serum: 5.90 mg/dL (10-30-18 @ 07:37)  Creatinine, Serum: 6.00 mg/dL (10-30-18 @ 05:49)  Creatinine, Serum: 5.40 mg/dL (10-29-18 @ 15:30)      PT/INR - ( 02 Nov 2018 08:01 )   PT: 13.4 sec;   INR: 1.17 ratio         PTT - ( 01 Nov 2018 05:33 )  PTT:32.4 sec          MICROBIOLOGY:    Culture - Body Fluid with Gram Stain (11.01.18 @ 21:29)    Gram Stain:   No polymorphonuclear cells seen  No organisms seen  by cytocentrifuge    Specimen Source: .Body Fluid Bile Fluid        RADIOLOGY & ADDITIONAL STUDIES:

## 2018-11-02 NOTE — PROGRESS NOTE ADULT - ASSESSMENT
·	ESRD on HD  ·	Cholecystitis, s/p Perc cholecystostomy  ·	Diabetes  ·	Hypertension    HD tomorrow. To continue HD TIW as scheduled. Fluid removal as tolerated by BP.   Dietary and PO fluid restriction. Epogen as needed for anemia. On IV abx.  Monitor BP trend. Titrate BP meds as needed. Salt restriction.   Monitor blood sugar levels. Insulin coverage as needed. Surgery follow up.   D/w family at bedside.

## 2018-11-02 NOTE — PROGRESS NOTE ADULT - ASSESSMENT
68y old female pmhx CAD s/p stent (2007), ESRD on HD MWF, DM2, HTN, HLD, anemia of chronic kidney disease admitted with severe sepsis secondary to acute cholecystitis, cholangitis requiring IR guided drainage     Plan:    Neuro:  Mentation improving. Tylenol 650g PO for mild pain PRN, Dilaudid 0.25mg IV for moderate pain PRN, Dilaudid 0.5mg IV for severe pain PRN. Continue Gabapentin    Cardio:  Continue metoprolol tartrate 50mg PO BID, nifedipine 60mg PO qd, imdur 60mg & 30mg PO qd. On ASA 81 mg PO qd, plavix 75 mg PO qd  Cardio recs appreciated, CE improved  Continue to hold Crestor due to LFTs will reassess tomorrow   Daily EKG's     Resp:  Off supplemental O2   Continue Bipap and NC PRN     GI:  S/p Perc drainage on 10/30 for acute cholecystitis  Bile Fluid Gram Stain (10/30): no PMN leukocytes seen. Numerous gram negative rods seen.  FU GI, no plan for ERCP  FU surgery recs, KUB negative  Started on clear liquid diet, advance as tolerated  WBC improving continue to trend  Alk phos trending. Continue to trend  Lactic acidosis resolved  Ammonia levels   Continue ferrous sulfate 325mg PO TID. Calcium carbonate + vit D 1 tablet PO daily  Ursodiol 300mg PO BID, reglan 10mg IV q8h for nausea and vomiting.   On bowel regimen with colace 100mg PO TID, senna 2 tablets PO bedtime  Simethicone 80mg q6h PRN for abdominal bloating    :  ESRD on HD  HD yesterday, continue MWF schedule from next week   Nephrology input appreciated     Heme:  H/H stable, epogen as needed for anemia --as per nephro    Endo:  DM2 on insulin, humalog sliding scale subQ q6h, holding lantus. Goal -180  BG on lower side, flowsheet reviewed     ID:  Cultures positive for ESBL E coli, Continue ertapenem. Per ID, anticipate 7 days of effective therapy past date of documented negative blood cultures.     PPx:  heparin 5000units subQ q8h for DVT ppx, protonix 40mg IV qd 68y old female pmhx CAD s/p stent (2007), ESRD on HD MWF, DM2, HTN, HLD, anemia of chronic kidney disease admitted with severe sepsis secondary to acute cholecystitis, cholangitis requiring IR guided drainage     Plan:    Neuro:  Mentation stable . Tylenol 650g PO for mild pain PRN, Dilaudid 0.25mg IV for moderate pain PRN, Dilaudid 0.5mg IV for severe pain PRN. Continue Gabapentin    Cardio:  Continue metoprolol tartrate 50mg PO BID, nifedipine 60mg PO qd, imdur 60mg & 30mg PO qd. On ASA 81 mg PO qd, plavix 75 mg PO qd  Cardio recs appreciated, CE improved  Continue to hold Crestor due to LFTs will reassess tomorrow   Daily EKG's     Resp:  Off supplemental O2   Continue Bipap and NC PRN     GI:  S/p Perc drainage on 10/30 for acute cholecystitis  Bile Fluid Gram Stain (10/30): no PMN leukocytes seen. Numerous gram negative rods seen.  FU GI, no plan for ERCP  FU surgery recs, KUB negative  Started on clear liquid diet, advance as tolerated  WBC improving continue to trend  Alk phos trending. Continue to trend  Lactic acidosis resolved  Ammonia levels   Continue ferrous sulfate 325mg PO TID. Calcium carbonate + vit D 1 tablet PO daily  Ursodiol 300mg PO BID, reglan 10mg IV q8h for nausea and vomiting.   On bowel regimen with colace 100mg PO TID, senna 2 tablets PO bedtime  Simethicone 80mg q6h PRN for abdominal bloating    :  ESRD on HD  HD yesterday, continue MWF schedule from next week   Nephrology input appreciated     Heme:  H/H stable, epogen as needed for anemia --as per nephro    Endo:  DM2 on insulin, humalog sliding scale subQ q6h, holding lantus. Goal -180  BG on lower side, flowsheet reviewed     ID:  Cultures positive for ESBL E coli, Continue ertapenem. Per ID, anticipate 7 days of effective therapy past date of documented negative blood cultures.     PPx:  heparin 5000units subQ q8h for DVT ppx, protonix 40mg IV qd 68y old female pmhx CAD s/p stent (2007), ESRD on HD MWF, DM2, HTN, HLD, anemia of chronic kidney disease admitted with severe sepsis secondary to acute cholecystitis, cholangitis requiring IR guided drainage     Plan:    Neuro:  Mentation stable . Tylenol 650g PO for mild pain PRN, Dilaudid 0.25mg IV for moderate pain PRN, Dilaudid 0.5mg IV for severe pain PRN. Continue Gabapentin    Cardio:  Continue metoprolol tartrate 50mg PO BID, nifedipine 60mg PO qd, imdur 60mg & 30mg PO qd. On ASA 81 mg PO qd, plavix 75 mg PO qd  Cardio recs appreciated, CE improved  Continue to hold Crestor due to LFTs  EKG today reviewed. Qtc appropriate. D/c daily EKG's    Resp:  Off supplemental O2   Continue Bipap and NC PRN     GI:  S/p Perc drainage on 10/30 for acute cholecystitis  Bile Fluid Gram Stain (10/30): no PMN leukocytes seen. Numerous gram negative rods seen.  FU GI, no plan for ERCP  FU surgery recs.  Diet advanced today.   WBC improving continue to trend  Alk phos trending. Continue to trend  Lactic acidosis resolved  Ammonia levels WNL. GGT ordered for AM   Continue ferrous sulfate 325mg PO TID. Calcium carbonate + vit D 1 tablet PO daily  Ursodiol 300mg PO BID. D/c Reglan started on Erythromycin for GI motility  KUB done today, no change in gastric distention  On bowel regimen with colace 100mg PO TID, senna 2 tablets PO bedtime.   Given Miralax x1 today, will reassess need tomorrow  Simethicone 80mg q6h PRN for abdominal bloating    :  ESRD on HD  HD yesterday, continue MWF schedule from next week   Nephrology input appreciated     Heme:  H/H stable, epogen as needed for anemia --as per nephro    Endo:  DM2 on insulin, humalog sliding scale subQ q6h, holding lantus. Goal -180  BG controlled, flow sheet reviewed     ID:  Cultures positive for ESBL E coli, Continue ertapenem. Per ID, anticipate 7 days of effective therapy past date of documented negative blood cultures.     PPx:  heparin 5000units subQ q8h for DVT ppx, protonix 40mg IV qd, PT eval

## 2018-11-02 NOTE — PROGRESS NOTE ADULT - SUBJECTIVE AND OBJECTIVE BOX
INTERVAL HPI/OVERNIGHT EVENTS:  pt seen and examined  c/o abd pain, denies n/v  per overnight rn, afebrile, no vomiting, tolerated clears, no bm, no s/s overt gib  sp hd  labs noted    MEDICATIONS  (STANDING):  aspirin enteric coated 81 milliGRAM(s) Oral daily  calcium carbonate 1250 mG  + Vitamin D (OsCal 500 + D) 1 Tablet(s) Oral daily  clopidogrel Tablet 75 milliGRAM(s) Oral daily  dextrose 5%. 1000 milliLiter(s) (50 mL/Hr) IV Continuous <Continuous>  dextrose 50% Injectable 12.5 Gram(s) IV Push once  dextrose 50% Injectable 25 Gram(s) IV Push once  dextrose 50% Injectable 25 Gram(s) IV Push once  docusate sodium 100 milliGRAM(s) Oral three times a day  ertapenem  IVPB      ertapenem  IVPB 500 milliGRAM(s) IV Intermittent every 24 hours  erythromycin     base Tablet 250 milliGRAM(s) Oral every 6 hours  ferrous    sulfate 325 milliGRAM(s) Oral three times a day  heparin  Injectable 5000 Unit(s) SubCutaneous every 8 hours  insulin lispro (HumaLOG) corrective regimen sliding scale   SubCutaneous Before meals and at bedtime  isosorbide   mononitrate ER Tablet (IMDUR) 60 milliGRAM(s) Oral every 24 hours  isosorbide   mononitrate ER Tablet (IMDUR) 30 milliGRAM(s) Oral every 24 hours  metoprolol tartrate 50 milliGRAM(s) Oral two times a day  NIFEdipine XL 60 milliGRAM(s) Oral daily  polyethylene glycol 3350 17 Gram(s) Oral once  senna 2 Tablet(s) Oral at bedtime  ursodiol Capsule 300 milliGRAM(s) Oral every 12 hours    MEDICATIONS  (PRN):  acetaminophen   Tablet .. 650 milliGRAM(s) Oral every 6 hours PRN Mild Pain (1 - 3)  dextrose 40% Gel 15 Gram(s) Oral once PRN Blood Glucose LESS THAN 70 milliGRAM(s)/deciliter  glucagon  Injectable 1 milliGRAM(s) IntraMuscular once PRN Glucose LESS THAN 70 milligrams/deciliter  HYDROmorphone  Injectable 0.25 milliGRAM(s) IV Push every 4 hours PRN Moderate Pain (4 - 6)  HYDROmorphone  Injectable 0.5 milliGRAM(s) IV Push every 4 hours PRN Severe Pain (7 - 10)  simethicone 80 milliGRAM(s) Chew every 6 hours PRN abdominal bloating      Allergies    No Known Drug Allergies  Purell (Rash)    Intolerances        Review of Systems:  unable to obtain in entirety        Vital Signs Last 24 Hrs  T(C): 36.6 (02 Nov 2018 07:37), Max: 37.1 (01 Nov 2018 23:57)  T(F): 97.9 (02 Nov 2018 07:37), Max: 98.7 (01 Nov 2018 23:57)  HR: 73 (02 Nov 2018 11:00) (67 - 102)  BP: 142/108 (02 Nov 2018 10:00) (97/50 - 173/74)  BP(mean): 121 (02 Nov 2018 10:00) (70 - 121)  RR: 34 (02 Nov 2018 11:00) (15 - 34)  SpO2: 97% (02 Nov 2018 11:00) (93% - 100%)    PHYSICAL EXAM:  Constitutional: NAD, lying in bed  HEENT: ncat  Neck: No LAD  Respiratory: dec bs  Cardiovascular: S1 and S2, RRR  Gastrointestinal: soft ttp mild dt perc kevin tube in place w +output  Extremities: edema  Vascular: 2+ peripheral pulses  Neurological: awake alert some confusion  Skin: No rashes    LABS:                        10.6   12.32 )-----------( 127      ( 02 Nov 2018 08:01 )             35.3     11-02    135  |  97  |  33<H>  ----------------------------<  159<H>  4.0   |  28  |  4.00<H>    Ca    8.5      02 Nov 2018 08:01  Phos  4.2     11-02  Mg     2.2     11-02    TPro  7.0  /  Alb  1.9<L>  /  TBili  2.9<H>  /  DBili  x   /  AST  49<H>  /  ALT  101<H>  /  AlkPhos  358<H>  11-02    PT/INR - ( 02 Nov 2018 08:01 )   PT: 13.4 sec;   INR: 1.17 ratio         PTT - ( 01 Nov 2018 05:33 )  PTT:32.4 sec      RADIOLOGY & ADDITIONAL TESTS:

## 2018-11-02 NOTE — CHART NOTE - NSCHARTNOTEFT_GEN_A_CORE
Assessment:   Per family, would not eat lunch, took only juice, but states no ab pain. S/p cholecystostomy tube. Had NG tube to drainage yesterday, but pulled out and refused reinsertion..  Factors impacting intake: [ ] none [ ] nausea  [ ] vomiting [ ] diarrhea [ ] constipation  [ ]chewing problems [ ] swallowing issues  [ ] other:     Diet Presciption: Diet, Consistent Carbohydrate Renal w/Evening Snack:   DASH/TLC {Sodium & Cholesterol Restricted}  Low Fat (LOWFAT) (11-02-18 @ 11:34)    Intake: 0%    Current Weight: Weight (kg): 94.4 (10-29 @ 20:59)  % Weight Change    Pertinent Medications: MEDICATIONS  (STANDING):  aspirin enteric coated 81 milliGRAM(s) Oral daily  calcium carbonate 1250 mG  + Vitamin D (OsCal 500 + D) 1 Tablet(s) Oral daily  clopidogrel Tablet 75 milliGRAM(s) Oral daily  dextrose 5%. 1000 milliLiter(s) (50 mL/Hr) IV Continuous <Continuous>  dextrose 50% Injectable 12.5 Gram(s) IV Push once  dextrose 50% Injectable 25 Gram(s) IV Push once  dextrose 50% Injectable 25 Gram(s) IV Push once  docusate sodium 100 milliGRAM(s) Oral three times a day  ertapenem  IVPB      ertapenem  IVPB 500 milliGRAM(s) IV Intermittent every 24 hours  erythromycin     base Tablet 250 milliGRAM(s) Oral every 6 hours  ferrous    sulfate 325 milliGRAM(s) Oral three times a day  heparin  Injectable 5000 Unit(s) SubCutaneous every 8 hours  insulin lispro (HumaLOG) corrective regimen sliding scale   SubCutaneous Before meals and at bedtime  isosorbide   mononitrate ER Tablet (IMDUR) 60 milliGRAM(s) Oral every 24 hours  isosorbide   mononitrate ER Tablet (IMDUR) 30 milliGRAM(s) Oral every 24 hours  metoprolol tartrate 50 milliGRAM(s) Oral two times a day  NIFEdipine XL 60 milliGRAM(s) Oral daily  senna 2 Tablet(s) Oral at bedtime  ursodiol Capsule 300 milliGRAM(s) Oral every 12 hours    MEDICATIONS  (PRN):  acetaminophen   Tablet .. 650 milliGRAM(s) Oral every 6 hours PRN Mild Pain (1 - 3)  dextrose 40% Gel 15 Gram(s) Oral once PRN Blood Glucose LESS THAN 70 milliGRAM(s)/deciliter  glucagon  Injectable 1 milliGRAM(s) IntraMuscular once PRN Glucose LESS THAN 70 milligrams/deciliter  HYDROmorphone  Injectable 0.25 milliGRAM(s) IV Push every 4 hours PRN Moderate Pain (4 - 6)  HYDROmorphone  Injectable 0.5 milliGRAM(s) IV Push every 4 hours PRN Severe Pain (7 - 10)  simethicone 80 milliGRAM(s) Chew every 6 hours PRN abdominal bloating    Pertinent Labs: 11-02 Na135 mmol/L Glu 159 mg/dL<H> K+ 4.0 mmol/L Cr  4.00 mg/dL<H> BUN 33 mg/dL<H> 11-02 Phos 4.2 mg/dL 11-02 Alb 1.9 g/dL<L> 10-30 [id number] 9.6 %<H>     CAPILLARY BLOOD GLUCOSE      POCT Blood Glucose.: 137 mg/dL (02 Nov 2018 12:26)  POCT Blood Glucose.: 165 mg/dL (02 Nov 2018 08:48)  POCT Blood Glucose.: 119 mg/dL (01 Nov 2018 23:55)  POCT Blood Glucose.: 104 mg/dL (01 Nov 2018 18:15)    Skin:     Estimated Needs:   [x ] no change since previous assessment  [ ] recalculated:     Previous Nutrition Diagnosis:   [ ] Inadequate Energy Intake [ ]Inadequate Oral Intake [ ] Excessive Energy Intake   [ ] Underweight [ ] Increased Nutrient Needs [ ] Overweight/Obesity   [x ] Altered GI Function [ ] Unintended Weight Loss [ ] Food & Nutrition Related Knowledge Deficit [ ] Malnutrition     Nutrition Diagnosis is [x ] ongoing  [ ] resolved [ ] not applicable     New Nutrition Diagnosis: [ ] not applicable       Interventions:   Recommend  [ ] Change Diet To:  [x ] Nutrition Supplement Consider adding Ensure clear until taking adequate solids  [ ] Nutrition Support  [ ] Other:     Monitoring and Evaluation:   [ ] PO intake [ x ] Tolerance to diet prescription [ x ] weights [ x ] labs[ x ] follow up per protocol  [ ] other:

## 2018-11-02 NOTE — PROGRESS NOTE ADULT - SUBJECTIVE AND OBJECTIVE BOX
IRENE TAYLOR  68y  Female    Patient is a 68y old  Female who presents with a chief complaint of Sepsis (02 Nov 2018 12:15)      HPI:    seen in ICU. Lethargic but arousable.       PAST MEDICAL & SURGICAL HISTORY:  ESRD (end stage renal disease) on dialysis: MWF  CAD (coronary artery disease): s/p stent in 2007  Diabetes  Myocardial infarction  Hypertension  H/O: hysterectomy      PHYSICAL EXAM:    T(C): 36.6 (11-02-18 @ 07:37), Max: 37.1 (11-01-18 @ 23:57)  HR: 72 (11-02-18 @ 12:00) (67 - 102)  BP: 133/63 (11-02-18 @ 12:00) (97/50 - 168/87)  RR: 25 (11-02-18 @ 12:00) (15 - 34)  SpO2: 98% (11-02-18 @ 12:00) (93% - 100%)  Wt(kg): --    I&O's Detail    01 Nov 2018 07:01  -  02 Nov 2018 07:00  --------------------------------------------------------  IN:    Oral Fluid: 420 mL    Solution: 100 mL    Solution: 50 mL  Total IN: 570 mL    OUT:    Drain: 50 mL    Other: 2300 mL  Total OUT: 2350 mL    Total NET: -1780 mL      02 Nov 2018 07:01  -  02 Nov 2018 13:10  --------------------------------------------------------  IN:    Oral Fluid: 118 mL    Solution: 50 mL  Total IN: 168 mL    OUT:  Total OUT: 0 mL    Total NET: 168 mL    Respiratory: clear anteriorly, decreased BS at bases  Cardiovascular: S1 S2  Gastrointestinal: soft NT ND +BS  Extremities: trace edema   Neuro: Awake and alert    MEDICATIONS  (STANDING):  aspirin enteric coated 81 milliGRAM(s) Oral daily  calcium carbonate 1250 mG  + Vitamin D (OsCal 500 + D) 1 Tablet(s) Oral daily  clopidogrel Tablet 75 milliGRAM(s) Oral daily  dextrose 5%. 1000 milliLiter(s) (50 mL/Hr) IV Continuous <Continuous>  dextrose 50% Injectable 12.5 Gram(s) IV Push once  dextrose 50% Injectable 25 Gram(s) IV Push once  dextrose 50% Injectable 25 Gram(s) IV Push once  docusate sodium 100 milliGRAM(s) Oral three times a day  ertapenem  IVPB      ertapenem  IVPB 500 milliGRAM(s) IV Intermittent every 24 hours  erythromycin     base Tablet 250 milliGRAM(s) Oral every 6 hours  ferrous    sulfate 325 milliGRAM(s) Oral three times a day  heparin  Injectable 5000 Unit(s) SubCutaneous every 8 hours  insulin lispro (HumaLOG) corrective regimen sliding scale   SubCutaneous Before meals and at bedtime  isosorbide   mononitrate ER Tablet (IMDUR) 60 milliGRAM(s) Oral every 24 hours  isosorbide   mononitrate ER Tablet (IMDUR) 30 milliGRAM(s) Oral every 24 hours  metoprolol tartrate 50 milliGRAM(s) Oral two times a day  NIFEdipine XL 60 milliGRAM(s) Oral daily  senna 2 Tablet(s) Oral at bedtime  ursodiol Capsule 300 milliGRAM(s) Oral every 12 hours    MEDICATIONS  (PRN):  acetaminophen   Tablet .. 650 milliGRAM(s) Oral every 6 hours PRN Mild Pain (1 - 3)  dextrose 40% Gel 15 Gram(s) Oral once PRN Blood Glucose LESS THAN 70 milliGRAM(s)/deciliter  glucagon  Injectable 1 milliGRAM(s) IntraMuscular once PRN Glucose LESS THAN 70 milligrams/deciliter  HYDROmorphone  Injectable 0.25 milliGRAM(s) IV Push every 4 hours PRN Moderate Pain (4 - 6)  HYDROmorphone  Injectable 0.5 milliGRAM(s) IV Push every 4 hours PRN Severe Pain (7 - 10)  simethicone 80 milliGRAM(s) Chew every 6 hours PRN abdominal bloating                            10.6   12.32 )-----------( 127      ( 02 Nov 2018 08:01 )             35.3       11-02    135  |  97  |  33<H>  ----------------------------<  159<H>  4.0   |  28  |  4.00<H>    Ca    8.5      02 Nov 2018 08:01  Phos  4.2     11-02  Mg     2.2     11-02    TPro  7.0  /  Alb  1.9<L>  /  TBili  2.9<H>  /  DBili  x   /  AST  49<H>  /  ALT  101<H>  /  AlkPhos  358<H>  11-02

## 2018-11-02 NOTE — PROGRESS NOTE ADULT - ASSESSMENT
68F PMH HTN, HLD, DM2, ESRD on dialysis, CAD s/p PCI (2007), and anemia of CKD, admitted with septic shock due to acute cholecystitis + ascending cholangitis, with ESBL E.coli bacteremia, fevers, transaminitis, cholestasis, lactic acidosis, leukocytosis, bandemia, and acute hypercapnic respiratory failure requiring bilevel PAP. Now s/p percutaneous cholecystostomy drain, post-op with new onset atrial fibrillation with RVR during dialysis 10/30, converted to NSR.    -Pain management  -HD improved, now off pressors. Continue HTN medications, plavix/asa  -Holding statin due to transaminitis  -Respiratory status improved on bipap. Continue bipap qhs and prn  -ADAT. PPI  -Continue erythromycin for GI motility  -Monitor perc-kevin drain output  -Blood cultures from 10/30 with E. Coli in both aerobic and anaerobic bottles and Fluid cultures from 10/30 with ESBL E. Coli (sensitivities not back yet) and hemolytic strept (sensative to ertapenem)  -Continue Ertapenem. Monitor wbc/temp. F/U ID  -HD per Renal. Dialyzed yesterday  -DVT ppx  -Supportive care 68F PMH HTN, HLD, DM2, ESRD on dialysis, CAD s/p PCI (2007), and anemia of CKD, admitted with septic shock due to acute cholecystitis + ascending cholangitis, with ESBL E.coli bacteremia, fevers, transaminitis, cholestasis, lactic acidosis, leukocytosis, bandemia, and acute hypercapnic respiratory failure requiring bipap. Now s/p percutaneous cholecystostomy drain, post-op with new onset atrial fibrillation with RVR during dialysis 10/30, converted to NSR.    -Pain management  -HD improved, now off pressors. Continue HTN medications, plavix/asa  -Holding statin due to transaminitis  -Respiratory status improved on bipap. Continue bipap qhs and prn  -ADAT. PPI  -Continue erythromycin for GI motility  -Monitor perc-kevin drain output  -Blood cultures from 10/30 with E. Coli in both aerobic and anaerobic bottles and Fluid cultures from 10/30 with ESBL E. Coli (sensitivities not back yet) and hemolytic strept (sensative to ertapenem)  -Continue Ertapenem. Monitor wbc/temp. F/U ID  -HD per Renal. Dialyzed yesterday  -DVT ppx  -Supportive care

## 2018-11-02 NOTE — PROGRESS NOTE ADULT - PROBLEM SELECTOR PLAN 1
2/2 acute calculus cholecystitis with biliary dilatation with common bile duct stone.   Blood Cx + for ESBL. E.coli antibx switched to Ertapenem. ID  f/u  Ac Respiratory failure resolved. on supplemental O2 with nasal cannula  Improving BP and breathing. lactic acidosis resolved  Improving WBCs, INR, renal indices and LFTs. AlkPo4 trending up

## 2018-11-02 NOTE — PROGRESS NOTE ADULT - PROBLEM SELECTOR PLAN 2
Ac cholecystitis with gall stones, cholangitis and choledocholithiasis  S/P P/C cholecystoscopy and drainage   S/P MRCP no stone in CBD, FU GI, may need ERCP  improving in AST/ALT, c/w ursodiol.  fluid cx + for  E.coli switched to Ertapenem. ID  f/u  further management per ICU

## 2018-11-02 NOTE — PROGRESS NOTE ADULT - PROBLEM SELECTOR PLAN 1
s/p perc c-tube  bld cxs w gnr, ecoli, f/u repeat cxs  serial lfts  clears as tolerated, diet as per surgery  cont abx per id  reglan for n/v  cont mily  increase bowel regimen  monitor drain output  f/u surgery recs  id following  monitor abd exam  pain control  no plans for ercp at present, will follow with you

## 2018-11-02 NOTE — PROGRESS NOTE ADULT - SUBJECTIVE AND OBJECTIVE BOX
pt seen  no complaints  feeling better  ICU Vital Signs Last 24 Hrs  T(C): 36.6 (02 Nov 2018 07:37), Max: 37.1 (01 Nov 2018 23:57)  T(F): 97.9 (02 Nov 2018 07:37), Max: 98.7 (01 Nov 2018 23:57)  HR: 73 (02 Nov 2018 11:00) (67 - 102)  BP: 142/108 (02 Nov 2018 10:00) (97/50 - 173/74)  BP(mean): 121 (02 Nov 2018 10:00) (70 - 121)  ABP: --  ABP(mean): --  RR: 34 (02 Nov 2018 11:00) (15 - 34)  SpO2: 97% (02 Nov 2018 11:00) (93% - 100%)  gen-NAD  resp-clear  abd-soft Nt./ND, cholecystostomy tube draining bile                          10.6   12.32 )-----------( 127      ( 02 Nov 2018 08:01 )             35.3   11-02    135  |  97  |  33<H>  ----------------------------<  159<H>  4.0   |  28  |  4.00<H>    Ca    8.5      02 Nov 2018 08:01  Phos  4.2     11-02  Mg     2.2     11-02    TPro  7.0  /  Alb  1.9<L>  /  TBili  2.9<H>  /  DBili  x   /  AST  49<H>  /  ALT  101<H>  /  AlkPhos  358<H>  11-02

## 2018-11-02 NOTE — PHYSICAL THERAPY INITIAL EVALUATION ADULT - PERTINENT HX OF CURRENT PROBLEM, REHAB EVAL
Patient is a 68y old female pmhx CAD s/p stent (2007), ESRD on HD, DM, HTN, HLD brought in with complaints of constant "hard pressure like" abdominal pain, nausea, vomiting, fever  for 2 days duration. Watery nonbloody nonfoul smelling diarrhea x 7 and NBNB vomiting x 7-8. For the past couple weeks pt has been having pain after meals, so has had a decreased PO intake.

## 2018-11-02 NOTE — PHYSICAL THERAPY INITIAL EVALUATION ADULT - ADDITIONAL COMMENTS
Pt lives in a private house with 3 steps to enter.  Pt ambulates with a SAC. Pt has a chair lift at home for the stairs.

## 2018-11-02 NOTE — PROGRESS NOTE ADULT - ASSESSMENT
Patient is now day #3 status post cholecystotomy tube for biliary sepsis. She had rapid atrial fibrillation on 10/30 and converted to sinus rhythm. She required pressor therapy which has now been discontinued. Her white blood count improved today remains hemodynamically stable. She has chronic renal failure on hemodialysis. Her elevated troponin level may represent a component of subendocardial ischemia without true atherothrombosis. These results are nonspecific in the setting of her kidney disease. Currently afebrile and arousable with no evidence of decompensated heart failure or active ischemic    Recommend:  Abx per ICU team  should be on antiplatelet therapy, ASA has been restarted  cont BB  If recurrent AF will consider amiodarone for the short term. Has remained in SR    Appears more tachypneic. Could be from restriction from abd distention.   Monitor for vol closely.   Check cxr and possibly repeat AXR.   Low threshold to remove volume. hd per renal    DVT prophylaxis  monitor and replete lytes, keep K>4, Mg>2  Other cardiovascular workup will depend on clinical course.  All other workup per primary team  Will follow     The patient is at risk of abrupt decompensation.  35 minutes of critical care time was spent with this patient.

## 2018-11-03 LAB
-  AMIKACIN: SIGNIFICANT CHANGE UP
-  AMOXICILLIN/CLAVULANIC ACID: SIGNIFICANT CHANGE UP
-  AMPICILLIN/SULBACTAM: SIGNIFICANT CHANGE UP
-  AMPICILLIN: SIGNIFICANT CHANGE UP
-  AZTREONAM: SIGNIFICANT CHANGE UP
-  CEFAZOLIN: SIGNIFICANT CHANGE UP
-  CEFEPIME: SIGNIFICANT CHANGE UP
-  CEFOXITIN: SIGNIFICANT CHANGE UP
-  CEFTRIAXONE: SIGNIFICANT CHANGE UP
-  CIPROFLOXACIN: SIGNIFICANT CHANGE UP
-  ERTAPENEM: SIGNIFICANT CHANGE UP
-  GENTAMICIN: SIGNIFICANT CHANGE UP
-  IMIPENEM: SIGNIFICANT CHANGE UP
-  LEVOFLOXACIN: SIGNIFICANT CHANGE UP
-  MEROPENEM: SIGNIFICANT CHANGE UP
-  PIPERACILLIN/TAZOBACTAM: SIGNIFICANT CHANGE UP
-  TOBRAMYCIN: SIGNIFICANT CHANGE UP
-  TRIMETHOPRIM/SULFAMETHOXAZOLE: SIGNIFICANT CHANGE UP
ALBUMIN SERPL ELPH-MCNC: 1.7 G/DL — LOW (ref 3.3–5)
ALP SERPL-CCNC: 403 U/L — HIGH (ref 40–120)
ALT FLD-CCNC: 68 U/L — SIGNIFICANT CHANGE UP (ref 12–78)
ANION GAP SERPL CALC-SCNC: 10 MMOL/L — SIGNIFICANT CHANGE UP (ref 5–17)
AST SERPL-CCNC: 39 U/L — HIGH (ref 15–37)
BASE EXCESS BLDV CALC-SCNC: 2 MMOL/L — SIGNIFICANT CHANGE UP (ref -2–2)
BASOPHILS # BLD AUTO: 0.03 K/UL — SIGNIFICANT CHANGE UP (ref 0–0.2)
BASOPHILS NFR BLD AUTO: 0.3 % — SIGNIFICANT CHANGE UP (ref 0–2)
BILIRUB SERPL-MCNC: 2.1 MG/DL — HIGH (ref 0.2–1.2)
BLOOD GAS COMMENTS, VENOUS: SIGNIFICANT CHANGE UP
BLOOD GAS COMMENTS, VENOUS: SIGNIFICANT CHANGE UP
BUN SERPL-MCNC: 53 MG/DL — HIGH (ref 7–23)
CALCIUM SERPL-MCNC: 8.8 MG/DL — SIGNIFICANT CHANGE UP (ref 8.5–10.1)
CHLORIDE SERPL-SCNC: 96 MMOL/L — SIGNIFICANT CHANGE UP (ref 96–108)
CO2 SERPL-SCNC: 29 MMOL/L — SIGNIFICANT CHANGE UP (ref 22–31)
CREAT SERPL-MCNC: 5.4 MG/DL — HIGH (ref 0.5–1.3)
EOSINOPHIL # BLD AUTO: 0.12 K/UL — SIGNIFICANT CHANGE UP (ref 0–0.5)
EOSINOPHIL NFR BLD AUTO: 1.2 % — SIGNIFICANT CHANGE UP (ref 0–6)
GLUCOSE SERPL-MCNC: 171 MG/DL — HIGH (ref 70–99)
GRAM STN FLD: SIGNIFICANT CHANGE UP
HCO3 BLDV-SCNC: 25 MMOL/L — SIGNIFICANT CHANGE UP (ref 21–29)
HCT VFR BLD CALC: 30.6 % — LOW (ref 34.5–45)
HGB BLD-MCNC: 9.5 G/DL — LOW (ref 11.5–15.5)
IMM GRANULOCYTES NFR BLD AUTO: 1.3 % — SIGNIFICANT CHANGE UP (ref 0–1.5)
INR BLD: 1.16 RATIO — SIGNIFICANT CHANGE UP (ref 0.88–1.16)
LYMPHOCYTES # BLD AUTO: 1.98 K/UL — SIGNIFICANT CHANGE UP (ref 1–3.3)
LYMPHOCYTES # BLD AUTO: 19.6 % — SIGNIFICANT CHANGE UP (ref 13–44)
MAGNESIUM SERPL-MCNC: 2.4 MG/DL — SIGNIFICANT CHANGE UP (ref 1.6–2.6)
MCHC RBC-ENTMCNC: 26.9 PG — LOW (ref 27–34)
MCHC RBC-ENTMCNC: 31 GM/DL — LOW (ref 32–36)
MCV RBC AUTO: 86.7 FL — SIGNIFICANT CHANGE UP (ref 80–100)
METHOD TYPE: SIGNIFICANT CHANGE UP
MONOCYTES # BLD AUTO: 1.4 K/UL — HIGH (ref 0–0.9)
MONOCYTES NFR BLD AUTO: 13.8 % — SIGNIFICANT CHANGE UP (ref 2–14)
NEUTROPHILS # BLD AUTO: 6.45 K/UL — SIGNIFICANT CHANGE UP (ref 1.8–7.4)
NEUTROPHILS NFR BLD AUTO: 63.8 % — SIGNIFICANT CHANGE UP (ref 43–77)
PCO2 BLDV: 58 MMHG — HIGH (ref 35–50)
PH BLDV: 7.3 — LOW (ref 7.35–7.45)
PHOSPHATE SERPL-MCNC: 3.3 MG/DL — SIGNIFICANT CHANGE UP (ref 2.5–4.5)
PLATELET # BLD AUTO: 127 K/UL — LOW (ref 150–400)
PO2 BLDV: 38 MMHG — SIGNIFICANT CHANGE UP (ref 25–45)
POTASSIUM SERPL-MCNC: 4.1 MMOL/L — SIGNIFICANT CHANGE UP (ref 3.5–5.3)
POTASSIUM SERPL-SCNC: 4.1 MMOL/L — SIGNIFICANT CHANGE UP (ref 3.5–5.3)
PROT SERPL-MCNC: 6.3 G/DL — SIGNIFICANT CHANGE UP (ref 6–8.3)
PROTHROM AB SERPL-ACNC: 13.3 SEC — HIGH (ref 10–12.9)
RBC # BLD: 3.53 M/UL — LOW (ref 3.8–5.2)
RBC # FLD: 16.7 % — HIGH (ref 10.3–14.5)
SAO2 % BLDV: 65 % — LOW (ref 67–88)
SODIUM SERPL-SCNC: 135 MMOL/L — SIGNIFICANT CHANGE UP (ref 135–145)
WBC # BLD: 10.11 K/UL — SIGNIFICANT CHANGE UP (ref 3.8–10.5)
WBC # FLD AUTO: 10.11 K/UL — SIGNIFICANT CHANGE UP (ref 3.8–10.5)

## 2018-11-03 PROCEDURE — 99233 SBSQ HOSP IP/OBS HIGH 50: CPT

## 2018-11-03 PROCEDURE — 99291 CRITICAL CARE FIRST HOUR: CPT

## 2018-11-03 RX ORDER — ERTAPENEM SODIUM 1 G/1
500 INJECTION, POWDER, LYOPHILIZED, FOR SOLUTION INTRAMUSCULAR; INTRAVENOUS EVERY 24 HOURS
Qty: 0 | Refills: 0 | Status: COMPLETED | OUTPATIENT
Start: 2018-11-04 | End: 2018-11-07

## 2018-11-03 RX ORDER — ATORVASTATIN CALCIUM 80 MG/1
40 TABLET, FILM COATED ORAL AT BEDTIME
Qty: 0 | Refills: 0 | Status: DISCONTINUED | OUTPATIENT
Start: 2018-11-03 | End: 2018-11-16

## 2018-11-03 RX ORDER — ATORVASTATIN CALCIUM 80 MG/1
20 TABLET, FILM COATED ORAL AT BEDTIME
Qty: 0 | Refills: 0 | Status: DISCONTINUED | OUTPATIENT
Start: 2018-11-03 | End: 2018-11-03

## 2018-11-03 RX ORDER — ERTAPENEM SODIUM 1 G/1
150 INJECTION, POWDER, LYOPHILIZED, FOR SOLUTION INTRAMUSCULAR; INTRAVENOUS ONCE
Qty: 0 | Refills: 0 | Status: COMPLETED | OUTPATIENT
Start: 2018-11-03 | End: 2018-11-03

## 2018-11-03 RX ORDER — ERTAPENEM SODIUM 1 G/1
150 INJECTION, POWDER, LYOPHILIZED, FOR SOLUTION INTRAMUSCULAR; INTRAVENOUS ONCE
Qty: 0 | Refills: 0 | Status: DISCONTINUED | OUTPATIENT
Start: 2018-11-03 | End: 2018-11-03

## 2018-11-03 RX ADMIN — ATORVASTATIN CALCIUM 40 MILLIGRAM(S): 80 TABLET, FILM COATED ORAL at 22:06

## 2018-11-03 RX ADMIN — Medication 250 MILLIGRAM(S): at 06:22

## 2018-11-03 RX ADMIN — Medication 325 MILLIGRAM(S): at 22:06

## 2018-11-03 RX ADMIN — Medication 60 MILLIGRAM(S): at 13:21

## 2018-11-03 RX ADMIN — URSODIOL 300 MILLIGRAM(S): 250 TABLET, FILM COATED ORAL at 06:22

## 2018-11-03 RX ADMIN — Medication 325 MILLIGRAM(S): at 06:22

## 2018-11-03 RX ADMIN — HEPARIN SODIUM 5000 UNIT(S): 5000 INJECTION INTRAVENOUS; SUBCUTANEOUS at 13:21

## 2018-11-03 RX ADMIN — Medication 81 MILLIGRAM(S): at 13:21

## 2018-11-03 RX ADMIN — Medication 325 MILLIGRAM(S): at 13:21

## 2018-11-03 RX ADMIN — HEPARIN SODIUM 5000 UNIT(S): 5000 INJECTION INTRAVENOUS; SUBCUTANEOUS at 06:22

## 2018-11-03 RX ADMIN — Medication 100 MILLIGRAM(S): at 13:21

## 2018-11-03 RX ADMIN — ERTAPENEM SODIUM 100 MILLIGRAM(S): 1 INJECTION, POWDER, LYOPHILIZED, FOR SOLUTION INTRAMUSCULAR; INTRAVENOUS at 09:11

## 2018-11-03 RX ADMIN — Medication 50 MILLIGRAM(S): at 17:42

## 2018-11-03 RX ADMIN — ISOSORBIDE MONONITRATE 60 MILLIGRAM(S): 60 TABLET, EXTENDED RELEASE ORAL at 06:22

## 2018-11-03 RX ADMIN — ERTAPENEM SODIUM 100 MILLIGRAM(S): 1 INJECTION, POWDER, LYOPHILIZED, FOR SOLUTION INTRAMUSCULAR; INTRAVENOUS at 17:43

## 2018-11-03 RX ADMIN — Medication 100 MILLIGRAM(S): at 06:22

## 2018-11-03 RX ADMIN — SENNA PLUS 2 TABLET(S): 8.6 TABLET ORAL at 22:06

## 2018-11-03 RX ADMIN — ISOSORBIDE MONONITRATE 30 MILLIGRAM(S): 60 TABLET, EXTENDED RELEASE ORAL at 17:42

## 2018-11-03 RX ADMIN — Medication 4: at 17:53

## 2018-11-03 RX ADMIN — Medication 2: at 22:05

## 2018-11-03 RX ADMIN — Medication 1 TABLET(S): at 13:21

## 2018-11-03 RX ADMIN — Medication 50 MILLIGRAM(S): at 06:22

## 2018-11-03 RX ADMIN — URSODIOL 300 MILLIGRAM(S): 250 TABLET, FILM COATED ORAL at 17:42

## 2018-11-03 RX ADMIN — HEPARIN SODIUM 5000 UNIT(S): 5000 INJECTION INTRAVENOUS; SUBCUTANEOUS at 22:05

## 2018-11-03 RX ADMIN — Medication 250 MILLIGRAM(S): at 13:21

## 2018-11-03 RX ADMIN — GABAPENTIN 300 MILLIGRAM(S): 400 CAPSULE ORAL at 13:20

## 2018-11-03 RX ADMIN — Medication 4: at 08:53

## 2018-11-03 RX ADMIN — Medication 100 MILLIGRAM(S): at 22:06

## 2018-11-03 RX ADMIN — CLOPIDOGREL BISULFATE 75 MILLIGRAM(S): 75 TABLET, FILM COATED ORAL at 13:22

## 2018-11-03 NOTE — PROGRESS NOTE ADULT - SUBJECTIVE AND OBJECTIVE BOX
Patient is a 68y old  Female who presents with a chief complaint of Sepsis (03 Nov 2018 17:30)    24 hour events: Chart/labs reviewed. Pt seen and examined at bedside. No acute events    PAST MEDICAL & SURGICAL HISTORY:  ESRD (end stage renal disease) on dialysis: MWF  CAD (coronary artery disease): s/p stent in 2007  Diabetes  Myocardial infarction  Hypertension  H/O: hysterectomy      Review of Systems:  Constitutional: No fever, chills, fatigue  Neuro: No headache, numbness, weakness  Resp: No cough, wheezing, shortness of breath  CVS: No chest pain, palpitations, leg swelling  GI: No abdominal pain, nausea, vomiting, diarrhea   : No dysuria, frequency, incontinence  Skin: No itching, burning, rashes, or lesions   Msk: No joint pain or swelling  Psych: No depression, anxiety, mood swings    T(F): 99 (11-03-18 @ 16:01), Max: 99.2 (11-03-18 @ 00:18)  HR: 71 (11-03-18 @ 19:00) (63 - 75)  BP: 121/56 (11-03-18 @ 18:00) (101/55 - 140/65)  RR: 26 (11-03-18 @ 19:00) (18 - 47)  SpO2: 97% (11-03-18 @ 19:00) (90% - 99%)  Wt(kg): --        CAPILLARY BLOOD GLUCOSE      POCT Blood Glucose.: 204 mg/dL (03 Nov 2018 17:50)      I&O's Summary    02 Nov 2018 07:01  -  03 Nov 2018 07:00  --------------------------------------------------------  IN: 586 mL / OUT: 65 mL / NET: 521 mL    03 Nov 2018 08:01  -  03 Nov 2018 19:50  --------------------------------------------------------  IN: 698 mL / OUT: 2010 mL / NET: -1312 mL        Physical Exam:     Gen: WD/WG eldery female  Neuro: A&Ox3, NAD  HEENT: NC/AT  Resp: CTA b/l  CVS: nl S1/S2, RRR  Abd: +tenderness, hypoactive BS, perc-kevin drain with dark bilious draingage  Ext: no edema, +pulses  Skin: well perfused, warm      Meds:    isosorbide   mononitrate ER Tablet (IMDUR) Oral  isosorbide   mononitrate ER Tablet (IMDUR) Oral  metoprolol tartrate Oral  NIFEdipine XL Oral  atorvastatin Oral  insulin lispro (HumaLOG) corrective regimen sliding scale SubCutaneous  acetaminophen   Tablet .. Oral PRN  gabapentin Oral  HYDROmorphone  Injectable IV Push PRN  HYDROmorphone  Injectable IV Push PRN  aspirin enteric coated Oral  clopidogrel Tablet Oral  heparin  Injectable SubCutaneous  docusate sodium Oral  senna Oral  simethicone Chew PRN  ursodiol Capsule Oral  calcium carbonate 1250 mG  + Vitamin D (OsCal 500 + D) Oral  dextrose 5%. IV Continuous  ferrous    sulfate Oral                            9.5    10.11 )-----------( 127      ( 03 Nov 2018 05:35 )             30.6       11-03    135  |  96  |  53<H>  ----------------------------<  171<H>  4.1   |  29  |  5.40<H>    Ca    8.8      03 Nov 2018 05:35  Phos  3.3     11-03  Mg     2.4     11-03    TPro  6.3  /  Alb  1.7<L>  /  TBili  2.1<H>  /  DBili  x   /  AST  39<H>  /  ALT  68  /  AlkPhos  403<H>  11-03          PT/INR - ( 03 Nov 2018 05:35 )   PT: 13.3 sec;   INR: 1.16 ratio             .Blood Blood-Venous   No growth to date. -- 11-02 @ 10:46  .Body Fluid Bile Fluid   Rare Escherichia coli ESBL   No polymorphonuclear cells seen  No organisms seen  by cytocentrifuge 11-01 @ 21:29  .Blood Blood-Peripheral   Growth in anaerobic bottle: Gram Negative Rods   Growth in anaerobic bottle: Gram Negative Rods 11-01 @ 08:35  Bile Bile Fluid   Numerous Escherichia coli  Moderate Alpha hemolytic strep "Susceptibilities not performed"   No polymorphonuclear leukocytes seen per low power field  Numerous Gram Negative Rods seen per oil power field 10-30 @ 21:29  .Blood Blood-Peripheral   Growth in aerobic and anaerobic bottles: Escherichia coli ESBL  See previous culture [id number]   Growth in aerobic and anaerobic bottles: Gram Negative Rods 10-30 @ 10:47          GLOBAL ISSUE/BEST PRACTICE:  Analgesia: yes  Sedation: no  HOB elevation: yes  Stress ulcer prophylaxis: yes  VTE prophylaxis: yes  Glycemic control: yes  Nutrition: yes      CODE STATUS: Full  GOC discussion: Y  Critical care time spent (mins): 30  (Reviewing data, imaging, discussing with multidisciplinary team, non inclusive of procedures, discussing goals of care with patient/family)

## 2018-11-03 NOTE — PROGRESS NOTE ADULT - SUBJECTIVE AND OBJECTIVE BOX
Interval events: no acute events overnight, was refusing oral meds this morning    Review of Systems:  Constitutional: no fever, chills, fatigue  Neuro: no headache, numbness, weakness  Resp: no cough, wheezing, shortness of breath  CVS: no chest pain, palpitations, leg swelling  GI: no abdominal pain, nausea, vomiting, diarrhea   : no dysuria, frequency, incontinence  Skin: no itching, burning, rashes, or lesions   Msk: no joint pain or swelling  Psych: no depression, anxiety    T(F): 98.3 (11-03-18 @ 08:39), Max: 99.2 (11-02-18 @ 15:40)  HR: 66 (11-03-18 @ 08:00) (66 - 74)  BP: 132/63 (11-03-18 @ 08:00) (113/57 - 150/69)  RR: 25 (11-03-18 @ 08:00) (17 - 35)  SpO2: 98% (11-03-18 @ 08:00) (90% - 99%)    POCT Blood Glucose.: 201 mg/dL (03 Nov 2018 08:24)    I&O's Summary    02 Nov 2018 07:01  -  03 Nov 2018 07:00  --------------------------------------------------------  IN: 586 mL / OUT: 65 mL / NET: 521 mL    Physical Exam:     Gen:  Neuro:  HEENT:  CV:  Pulm:  GI:  Ext:  Skin:    Meds:  aspirin enteric coated 81 milliGRAM(s) Oral daily  clopidogrel Tablet 75 milliGRAM(s) Oral daily  heparin  Injectable 5000 Unit(s) SubCutaneous every 8 hours  ertapenem  IVPB 500 milliGRAM(s) IV Intermittent every 24 hours  erythromycin     base Tablet 250 milliGRAM(s) Oral every 6 hours  isosorbide   mononitrate ER Tablet (IMDUR) 60 milliGRAM(s) Oral qAM, 30 mg oral qHS  metoprolol tartrate 50 milliGRAM(s) Oral two times a day  NIFEdipine XL 60 milliGRAM(s) Oral daily  insulin lispro (HumaLOG) corrective regimen sliding scale   SubCutaneous Before meals and at bedtime  acetaminophen   Tablet .. 650 milliGRAM(s) Oral every 6 hours PRN  gabapentin 300 milliGRAM(s) Oral daily  HYDROmorphone  Injectable 0.25 milliGRAM(s) IV Push every 4 hours PRN  HYDROmorphone  Injectable 0.5 milliGRAM(s) IV Push every 4 hours PRN  docusate sodium 100 milliGRAM(s) Oral three times a day  senna 2 Tablet(s) Oral at bedtime  simethicone 80 milliGRAM(s) Chew every 6 hours PRN  ursodiol Capsule 300 milliGRAM(s) Oral every 12 hours  calcium carbonate 1250 mG  + Vitamin D (OsCal 500 + D) 1 Tablet(s) Oral daily  dextrose 5%. 1000 milliLiter(s) IV Continuous <Continuous>  ferrous    sulfate 325 milliGRAM(s) Oral three times a day                        9.5    10.11 )-----------( 127      ( 03 Nov 2018 05:35 )             30.6     135  |  96  |  53  -------------------------<  171<H>  4.1   |  29  |  5.40    Ca    8.8      03 Nov 2018 05:35  Phos  3.3     11-03  Mg     2.4     11-03    TPro  6.3  /  Alb  1.7<L>  /  TBili  2.1<H>  /  DBili  x   /  AST  39<H>  /  ALT  68  /  AlkPhos  403<H>  11-03    PT/INR - ( 03 Nov 2018 05:35 )   PT: 13.3 sec;   INR: 1.16 ratio      .Body Fluid Bile Fluid   Rare Gram Negative Rods   No polymorphonuclear cells seen  No organisms seen  by cytocentrifuge 11-01 @ 21:29  .Blood Blood-Peripheral   No growth to date. -- 11-01 @ 08:35  Bile Bile Fluid   Numerous Escherichia coli  Moderate Alpha hemolytic strep "Susceptibilities not performed"   No polymorphonuclear leukocytes seen per low power field  Numerous Gram Negative Rods seen per oil power field 10-30 @ 21:29  .Blood Blood-Peripheral   Growth in aerobic and anaerobic bottles: Escherichia coli ESBL  See previous culture [id number]   Growth in aerobic and anaerobic bottles: Gram Negative Rods 10-30 @ 10:47  .Urine Clean Catch (Midstream)   No growth -- 10-29 @ 19:10  .Blood Blood-Peripheral   Growth in aerobic and anaerobic bottles: Escherichia coli ESBL  See previous culture [id number]   Growth in aerobic and anaerobic bottles: Gram Negative Rods 10-29 @ 19:05  .Blood Blood-Peripheral   Growth in aerobic and anaerobic bottles: Escherichia coli ESBL  Growth in aerobic and anaerobic bottles: Gram Negative Rods 10-29 @ 19:03    Rapid RVP Result: NotDetec (10-29 @ 15:30)    ABG - ( 02 Nov 2018 10:29 )  pH, Arterial: 7.32  pH, Blood: x     /  pCO2: 51    /  pO2: 79    / HCO3: 24    / Base Excess: 0.3   /  SaO2: 93          CENTRAL LINE: N    BERMUDEZ: N    A-LINE: N    GLOBAL ISSUE/BEST PRACTICE:  Analgesia: yes  Sedation: n/a  CAM-ICU: N  HOB elevation: yes  Stress ulcer prophylaxis: n/a  VTE prophylaxis: HSQ  Glycemic control: yes  Nutrition: consistent carbohydrate    CODE STATUS: full Interval events: no acute events overnight, was refusing oral meds this morning    Review of Systems:  Constitutional: no fever, chills, fatigue  Neuro: no headache, numbness, weakness  Resp: no cough, wheezing, shortness of breath  CVS: no chest pain, palpitations, leg swelling  GI: no abdominal pain, nausea, vomiting, diarrhea   : no dysuria, frequency, incontinence  Skin: no itching, burning, rashes, or lesions   Msk: no joint pain or swelling  Psych: no depression, anxiety    T(F): 98.3 (11-03-18 @ 08:39), Max: 99.2 (11-02-18 @ 15:40)  HR: 66 (11-03-18 @ 08:00) (66 - 74)  BP: 132/63 (11-03-18 @ 08:00) (113/57 - 150/69)  RR: 25 (11-03-18 @ 08:00) (17 - 35)  SpO2: 98% (11-03-18 @ 08:00) (90% - 99%)    POCT Blood Glucose.: 201 mg/dL (03 Nov 2018 08:24)    I&O's Summary    02 Nov 2018 07:01  -  03 Nov 2018 07:00  --------------------------------------------------------  IN: 586 mL / OUT: 65 mL / NET: 521 mL    Physical Exam:     Gen: NAD; AAOx3  Neuro: CN II-XII grossly intact; moving all extremities   HEENT: NC/AT; EOMI; MMM  CV: normal S1 & S2; regular rate and rhythm   Pulm: clear to auscultation bilaterally   GI: obese; soft; decreased tenderness to palpation; normal bowel sounds; percutaneous cholecystostomy tube with green bile  Ext: no edema; pulses intact  Skin: warm, well perfused    Meds:  aspirin enteric coated 81 milliGRAM(s) Oral daily  clopidogrel Tablet 75 milliGRAM(s) Oral daily  heparin  Injectable 5000 Unit(s) SubCutaneous every 8 hours  ertapenem  IVPB 500 milliGRAM(s) IV Intermittent every 24 hours  erythromycin     base Tablet 250 milliGRAM(s) Oral every 6 hours  isosorbide   mononitrate ER Tablet (IMDUR) 60 milliGRAM(s) Oral qAM, 30 mg oral qHS  metoprolol tartrate 50 milliGRAM(s) Oral two times a day  NIFEdipine XL 60 milliGRAM(s) Oral daily  insulin lispro (HumaLOG) corrective regimen sliding scale   SubCutaneous Before meals and at bedtime  acetaminophen   Tablet .. 650 milliGRAM(s) Oral every 6 hours PRN  gabapentin 300 milliGRAM(s) Oral daily  HYDROmorphone  Injectable 0.25 milliGRAM(s) IV Push every 4 hours PRN  HYDROmorphone  Injectable 0.5 milliGRAM(s) IV Push every 4 hours PRN  docusate sodium 100 milliGRAM(s) Oral three times a day  senna 2 Tablet(s) Oral at bedtime  simethicone 80 milliGRAM(s) Chew every 6 hours PRN  ursodiol Capsule 300 milliGRAM(s) Oral every 12 hours  calcium carbonate 1250 mG  + Vitamin D (OsCal 500 + D) 1 Tablet(s) Oral daily  dextrose 5%. 1000 milliLiter(s) IV Continuous <Continuous>  ferrous    sulfate 325 milliGRAM(s) Oral three times a day                        9.5    10.11 )-----------( 127      ( 03 Nov 2018 05:35 )             30.6     135  |  96  |  53  -------------------------<  171<H>  4.1   |  29  |  5.40    Ca    8.8      03 Nov 2018 05:35  Phos  3.3     11-03  Mg     2.4     11-03    TPro  6.3  /  Alb  1.7<L>  /  TBili  2.1<H>  /  DBili  x   /  AST  39<H>  /  ALT  68  /  AlkPhos  403<H>  11-03    PT/INR - ( 03 Nov 2018 05:35 )   PT: 13.3 sec;   INR: 1.16 ratio      .Body Fluid Bile Fluid   Rare Gram Negative Rods   No polymorphonuclear cells seen  No organisms seen  by cytocentrifuge 11-01 @ 21:29  .Blood Blood-Peripheral   No growth to date. -- 11-01 @ 08:35  Bile Bile Fluid   Numerous Escherichia coli  Moderate Alpha hemolytic strep "Susceptibilities not performed"   No polymorphonuclear leukocytes seen per low power field  Numerous Gram Negative Rods seen per oil power field 10-30 @ 21:29  .Blood Blood-Peripheral   Growth in aerobic and anaerobic bottles: Escherichia coli ESBL  See previous culture [id number]   Growth in aerobic and anaerobic bottles: Gram Negative Rods 10-30 @ 10:47  .Urine Clean Catch (Midstream)   No growth -- 10-29 @ 19:10  .Blood Blood-Peripheral   Growth in aerobic and anaerobic bottles: Escherichia coli ESBL  See previous culture [id number]   Growth in aerobic and anaerobic bottles: Gram Negative Rods 10-29 @ 19:05  .Blood Blood-Peripheral   Growth in aerobic and anaerobic bottles: Escherichia coli ESBL  Growth in aerobic and anaerobic bottles: Gram Negative Rods 10-29 @ 19:03    Rapid RVP Result: NotDetec (10-29 @ 15:30)    ABG - ( 02 Nov 2018 10:29 )  pH, Arterial: 7.32  pH, Blood: x     /  pCO2: 51    /  pO2: 79    / HCO3: 24    / Base Excess: 0.3   /  SaO2: 93          CENTRAL LINE: N    BERMUDEZ: N    A-LINE: N    GLOBAL ISSUE/BEST PRACTICE:  Analgesia: yes  Sedation: n/a  CAM-ICU: N  HOB elevation: yes  Stress ulcer prophylaxis: n/a  VTE prophylaxis: HSQ  Glycemic control: yes  Nutrition: consistent carbohydrate    CODE STATUS: full

## 2018-11-03 NOTE — PROGRESS NOTE ADULT - SUBJECTIVE AND OBJECTIVE BOX
Patient is a 68y old  Female who presents with a chief complaint of Sepsis (31 Oct 2018 07:06)      Patient seen in follow up for ESRD on HD. s/p Perc Cholecystostomy. + blood cultures ESBL     PAST MEDICAL HISTORY:  ESRD (end stage renal disease) on dialysis  CAD (coronary artery disease)  Diabetes  CRF (chronic renal failure)  Hypertension  Pneumonia  Myocardial infarction  Hypertension  Diabetes     MEDICATIONS  (STANDING):  aspirin enteric coated 81 milliGRAM(s) Oral daily  atorvastatin 40 milliGRAM(s) Oral at bedtime  calcium carbonate 1250 mG  + Vitamin D (OsCal 500 + D) 1 Tablet(s) Oral daily  clopidogrel Tablet 75 milliGRAM(s) Oral daily  dextrose 5%. 1000 milliLiter(s) (50 mL/Hr) IV Continuous <Continuous>  dextrose 50% Injectable 12.5 Gram(s) IV Push once  dextrose 50% Injectable 25 Gram(s) IV Push once  dextrose 50% Injectable 25 Gram(s) IV Push once  docusate sodium 100 milliGRAM(s) Oral three times a day  ertapenem  IVPB      ertapenem  IVPB 500 milliGRAM(s) IV Intermittent every 24 hours  ertapenem  IVPB 150 milliGRAM(s) IV Intermittent once  erythromycin     base Tablet 250 milliGRAM(s) Oral every 6 hours  ferrous    sulfate 325 milliGRAM(s) Oral three times a day  gabapentin 300 milliGRAM(s) Oral daily  heparin  Injectable 5000 Unit(s) SubCutaneous every 8 hours  insulin lispro (HumaLOG) corrective regimen sliding scale   SubCutaneous Before meals and at bedtime  isosorbide   mononitrate ER Tablet (IMDUR) 30 milliGRAM(s) Oral every 24 hours  isosorbide   mononitrate ER Tablet (IMDUR) 60 milliGRAM(s) Oral <User Schedule>  metoprolol tartrate 50 milliGRAM(s) Oral two times a day  NIFEdipine XL 60 milliGRAM(s) Oral daily  senna 2 Tablet(s) Oral at bedtime  ursodiol Capsule 300 milliGRAM(s) Oral every 12 hours    MEDICATIONS  (PRN):  acetaminophen   Tablet .. 650 milliGRAM(s) Oral every 6 hours PRN Mild Pain (1 - 3)  dextrose 40% Gel 15 Gram(s) Oral once PRN Blood Glucose LESS THAN 70 milliGRAM(s)/deciliter  glucagon  Injectable 1 milliGRAM(s) IntraMuscular once PRN Glucose LESS THAN 70 milligrams/deciliter  HYDROmorphone  Injectable 0.25 milliGRAM(s) IV Push every 4 hours PRN Moderate Pain (4 - 6)  HYDROmorphone  Injectable 0.5 milliGRAM(s) IV Push every 4 hours PRN Severe Pain (7 - 10)  simethicone 80 milliGRAM(s) Chew every 6 hours PRN abdominal bloating    T(C): 36.7 (11-03-18 @ 11:58), Max: 37.3 (11-02-18 @ 15:40)  HR: 74 (11-03-18 @ 11:00) (63 - 102)  BP: 130/84 (11-03-18 @ 11:00) (97/50 - 173/74)  RR: 38 (11-03-18 @ 11:00)  SpO2: 96% (11-03-18 @ 11:00)  Wt(kg): --  I&O's Detail    02 Nov 2018 07:01  -  03 Nov 2018 07:00  --------------------------------------------------------  IN:    Oral Fluid: 536 mL    Solution: 50 mL  Total IN: 586 mL    OUT:    Drain: 65 mL  Total OUT: 65 mL    Total NET: 521 mL      03 Nov 2018 08:01  -  03 Nov 2018 12:08  --------------------------------------------------------  IN:    Solution: 50 mL  Total IN: 50 mL    OUT:  Total OUT: 0 mL    Total NET: 50 mL              PHYSICAL EXAM:  General: NAD, Rt chest dialysis catheter  Respiratory: b/l air entry  Cardiovascular: S1 S2  Gastrointestinal: soft  Extremities:  no edema                   LABORATORY:                        9.5    10.11 )-----------( 127      ( 03 Nov 2018 05:35 )             30.6     11-03    135  |  96  |  53<H>  ----------------------------<  171<H>  4.1   |  29  |  5.40<H>    Ca    8.8      03 Nov 2018 05:35  Phos  3.3     11-03  Mg     2.4     11-03    TPro  6.3  /  Alb  1.7<L>  /  TBili  2.1<H>  /  DBili  x   /  AST  39<H>  /  ALT  68  /  AlkPhos  403<H>  11-03    Sodium, Serum: 135 mmol/L (11-03 @ 05:35)  Sodium, Serum: 135 mmol/L (11-02 @ 08:01)    Potassium, Serum: 4.1 mmol/L (11-03 @ 05:35)  Potassium, Serum: 4.0 mmol/L (11-02 @ 08:01)    Hemoglobin: 9.5 g/dL (11-03 @ 05:35)  Hemoglobin: 10.6 g/dL (11-02 @ 08:01)  Hemoglobin: 10.2 g/dL (11-01 @ 05:33)    Creatinine, Serum 5.40 (11-03 @ 05:35)  Creatinine, Serum 4.00 (11-02 @ 08:01)  Creatinine, Serum 5.40 (11-01 @ 05:33)        LIVER FUNCTIONS - ( 03 Nov 2018 05:35 )  Alb: 1.7 g/dL / Pro: 6.3 g/dL / ALK PHOS: 403 U/L / ALT: 68 U/L / AST: 39 U/L / GGT: x             ABG - ( 02 Nov 2018 10:29 )  pH, Arterial: 7.32  pH, Blood: x     /  pCO2: 51    /  pO2: 79    / HCO3: 24    / Base Excess: 0.3   /  SaO2: 93

## 2018-11-03 NOTE — PROGRESS NOTE ADULT - ASSESSMENT
68F PMH HTN, HLD, DM2, ESRD on dialysis, CAD s/p PCI (2007), and anemia of CKD, admitted with septic shock due to acute cholecystitis + ascending cholangitis, with ESBL E.coli bacteremia, fevers, transaminitis, cholestasis, lactic acidosis, leukocytosis, bandemia, and acute hypercapnic respiratory failure requiring bipap. Now s/p percutaneous cholecystostomy drain, post-op with new onset atrial fibrillation with RVR during dialysis 10/30, converted to NSR.    -Pain management  -HD improved, now off pressors. Continue HTN medications, plavix/asa  -Holding statin due to transaminitis  -Respiratory status improved on bipap. Continue bipap qhs and prn  -ADAT. PPI  -Continue erythromycin for GI motility  -Monitor perc-kevin drain output  -Blood cultures from 10/30 with E. Coli in both aerobic and anaerobic bottles and Fluid cultures from 10/30 with ESBL E. Coli (sensitivities not back yet) and hemolytic strept (sensative to ertapenem)  -Continue Ertapenem. Monitor wbc/temp. F/U ID  -HD per Renal. Dialyzed yesterday  -DVT ppx  -Supportive care

## 2018-11-03 NOTE — PROGRESS NOTE ADULT - ASSESSMENT
68F PMH HTN, HLD, DM2, ESRD on dialysis, CAD s/p PCI (2007), and anemia of chronic kidney disease who presents with septic shock due to acute cholecystitis + ascending cholangitis, with ESBL E.coli bacteremia, fevers, transaminitis, cholestasis, lactic acidosis, leukocytosis, bandemia, and acute hypercapnic respiratory failure requiring bilevel PAP. Now s/p percutaneous cholecystostomy drain, post-op with new onset atrial fibrillation with RVR during dialysis 10/30, converted to NSR. Now improved    - Pain control with acetaminophen and hydromorphone prn, continue gabapentin 300 qd, not requiring extra doses  - Resolved shock. Remains in NSR. New a-fib in the setting of acute illness, now resolved. Hold a/c  - Continue aspirin, clopidogrel, metoprolol, isosorbide mononitrate and nifedipine. Restart statin given resolved transaminitis  - Mild hypercapnia with normal pH, suspect LIZZY/OHS. Continue bilevel PAP qhs and NC during day. Needs outpatient sleep study  - Tolerating consistent carbohydrate DASH/TLC renal diet, resolved nausea/vomiting. D/c erythromycin. Continue bowel regimen  - S/p perc kevin drain 10/30. Monitor drain output, initial bile fluid cultures with ESBL E.coli + alpha hemolytic strep. Repeat bile fluid cultures with rare GNR. Blood cultures with ESBL E.coli, repeat cultures from 11/1 with ESBL in 1/4 bottles. Repeat cultures from 11/2 with NGTD. Continue Ertapenem, give supplemental dose post-dialysis  - ESRD, dialysis today, f/up renal, to change back to MWF schedule next week  - DVT ppx with HSQ  - DM2, A1C 9.6, continue with insulin coverage scale. Restart insulin glargine when tolerating po  - No aguilar or lines  - Discussed with patient at bedside, full code  - Stable for transfer to floors

## 2018-11-03 NOTE — PROGRESS NOTE ADULT - ASSESSMENT
·	ESRD on HD  ·	Cholecystitis, s/p Perc cholecystostomy  ·	Diabetes  ·	Hypertension    HD today. To continue HD TIW as scheduled. Fluid removal as tolerated by BP.   Dietary and PO fluid restriction. Epogen as needed for anemia. On IV abx.  Monitor BP trend. Titrate BP meds as needed. Salt restriction.   Monitor blood sugar levels. Insulin coverage as needed. Surgery follow up.

## 2018-11-03 NOTE — PROGRESS NOTE ADULT - SUBJECTIVE AND OBJECTIVE BOX
[facility] -- Glynn Bullock, Susan, Claudia, Morgan Sams Savella  Office # [phone]      Follow Up:  CAD    Subjective/Observations: Patient seen and examined. Events noted. Resting comfortably in bed. No complaints of chest pain, dyspnea, or palpitations reported. No signs of orthopnea or PND.       REVIEW OF SYSTEMS: All other review of systems is negative unless indicated above    PAST MEDICAL & SURGICAL HISTORY:  ESRD (end stage renal disease) on dialysis: MWF  CAD (coronary artery disease): s/p stent in 2007  Diabetes  Myocardial infarction  Hypertension  H/O: hysterectomy      MEDICATIONS  (STANDING):  aspirin enteric coated 81 milliGRAM(s) Oral daily  calcium carbonate 1250 mG  + Vitamin D (OsCal 500 + D) 1 Tablet(s) Oral daily  clopidogrel Tablet 75 milliGRAM(s) Oral daily  dextrose 5%. 1000 milliLiter(s) (50 mL/Hr) IV Continuous <Continuous>  dextrose 50% Injectable 12.5 Gram(s) IV Push once  dextrose 50% Injectable 25 Gram(s) IV Push once  dextrose 50% Injectable 25 Gram(s) IV Push once  docusate sodium 100 milliGRAM(s) Oral three times a day  ertapenem  IVPB      ertapenem  IVPB 500 milliGRAM(s) IV Intermittent every 24 hours  erythromycin     base Tablet 250 milliGRAM(s) Oral every 6 hours  ferrous    sulfate 325 milliGRAM(s) Oral three times a day  gabapentin 300 milliGRAM(s) Oral daily  heparin  Injectable 5000 Unit(s) SubCutaneous every 8 hours  insulin lispro (HumaLOG) corrective regimen sliding scale   SubCutaneous Before meals and at bedtime  isosorbide   mononitrate ER Tablet (IMDUR) 30 milliGRAM(s) Oral every 24 hours  isosorbide   mononitrate ER Tablet (IMDUR) 60 milliGRAM(s) Oral <User Schedule>  metoprolol tartrate 50 milliGRAM(s) Oral two times a day  NIFEdipine XL 60 milliGRAM(s) Oral daily  senna 2 Tablet(s) Oral at bedtime  ursodiol Capsule 300 milliGRAM(s) Oral every 12 hours    MEDICATIONS  (PRN):  acetaminophen   Tablet .. 650 milliGRAM(s) Oral every 6 hours PRN Mild Pain (1 - 3)  dextrose 40% Gel 15 Gram(s) Oral once PRN Blood Glucose LESS THAN 70 milliGRAM(s)/deciliter  glucagon  Injectable 1 milliGRAM(s) IntraMuscular once PRN Glucose LESS THAN 70 milligrams/deciliter  HYDROmorphone  Injectable 0.25 milliGRAM(s) IV Push every 4 hours PRN Moderate Pain (4 - 6)  HYDROmorphone  Injectable 0.5 milliGRAM(s) IV Push every 4 hours PRN Severe Pain (7 - 10)  simethicone 80 milliGRAM(s) Chew every 6 hours PRN abdominal bloating      Allergies    No Known Drug Allergies  Purell (Rash)    Intolerances            Vital Signs Last 24 Hrs  T(C): 37.2 (03 Nov 2018 04:01), Max: 37.3 (02 Nov 2018 15:40)  T(F): 99 (03 Nov 2018 04:01), Max: 99.2 (02 Nov 2018 15:40)  HR: 67 (03 Nov 2018 07:00) (67 - 74)  BP: 130/55 (03 Nov 2018 07:00) (113/57 - 150/69)  BP(mean): 85 (03 Nov 2018 07:00) (79 - 121)  RR: 23 (03 Nov 2018 07:00) (17 - 35)  SpO2: 99% (03 Nov 2018 07:00) (90% - 99%)    I&O's Summary    02 Nov 2018 07:01  -  03 Nov 2018 07:00  --------------------------------------------------------  IN: 586 mL / OUT: 65 mL / NET: 521 mL          PHYSICAL EXAM:  TELE:   Constitutional: NAD, awake    HEENT: Moist Mucous Membranes, Anicteric  Pulmonary: Decreased breath sounds b/l. No rales, crackles or wheeze appreciated.   Cardiovascular: Regular, S1 and S2, No murmurs, rubs, gallops or clicks  Gastrointestinal: Bowel Sounds present, soft, nontender. distended  Lymph: No peripheral edema. No lymphadenopathy.  Skin: No visible rashes or ulcers.  Psych:  Mood & affect appropriate for situation    LABS: All Labs Reviewed:                        9.5    10.11 )-----------( 127      ( 03 Nov 2018 05:35 )             30.6                         10.6   12.32 )-----------( 127      ( 02 Nov 2018 08:01 )             35.3                         10.2   15.58 )-----------( 147      ( 01 Nov 2018 05:33 )             35.2     03 Nov 2018 05:35    135    |  96     |  53     ----------------------------<  171    4.1     |  29     |  5.40   02 Nov 2018 08:01    135    |  97     |  33     ----------------------------<  159    4.0     |  28     |  4.00   01 Nov 2018 05:33    138    |  100    |  52     ----------------------------<  137    4.7     |  28     |  5.40     Ca    8.8        03 Nov 2018 05:35  Ca    8.5        02 Nov 2018 08:01  Ca    8.3        01 Nov 2018 05:33  Phos  3.3       03 Nov 2018 05:35  Phos  4.2       02 Nov 2018 08:01  Phos  5.6       01 Nov 2018 05:33  Mg     2.4       03 Nov 2018 05:35  Mg     2.2       02 Nov 2018 08:01  Mg     2.3       01 Nov 2018 05:33    TPro  6.3    /  Alb  1.7    /  TBili  2.1    /  DBili  x      /  AST  39     /  ALT  68     /  AlkPhos  403    03 Nov 2018 05:35  TPro  7.0    /  Alb  1.9    /  TBili  2.9    /  DBili  x      /  AST  49     /  ALT  101    /  AlkPhos  358    02 Nov 2018 08:01  TPro  7.1    /  Alb  2.0    /  TBili  3.9    /  DBili  x      /  AST  118    /  ALT  159    /  AlkPhos  317    01 Nov 2018 05:33    PT/INR - ( 03 Nov 2018 05:35 )   PT: 13.3 sec;   INR: 1.16 ratio

## 2018-11-03 NOTE — PROGRESS NOTE ADULT - SUBJECTIVE AND OBJECTIVE BOX
INTERVAL HPI/OVERNIGHT EVENTS:  No new overnight event.  No N/V/D.  Tolerating diet.   drain in ruq putting out    Allergies    No Known Drug Allergies  Purell (Rash)    Intolerances          General:  No wt loss, fevers, chills, night sweats, fatigue,   Eyes:  Good vision, no reported pain  ENT:  No sore throat, pain, runny nose, dysphagia  CV:  No pain, palpitations, hypo/hypertension  Resp:  No dyspnea, cough, tachypnea, wheezing  GI:  No pain, No nausea, No vomiting, No diarrhea, No constipation, No weight loss, No fever, No pruritis, No rectal bleeding, No tarry stools, No dysphagia,  :  No pain, bleeding, incontinence, nocturia  Muscle:  No pain, weakness  Neuro:  No weakness, tingling, memory problems  Psych:  No fatigue, insomnia, mood problems, depression  Endocrine:  No polyuria, polydipsia, cold/heat intolerance  Heme:  No petechiae, ecchymosis, easy bruisability  Skin:  No rash, tattoos, scars, edema      PHYSICAL EXAM:   Vital Signs:  Vital Signs Last 24 Hrs  T(C): 37.2 (2018 16:01), Max: 37.3 (2018 00:18)  T(F): 99 (2018 16:01), Max: 99.2 (2018 00:18)  HR: 74 (2018 17:00) (63 - 74)  BP: 107/55 (2018 17:00) (101/55 - 140/65)  BP(mean): 77 (2018 17:00) (73 - 99)  RR: 47 (2018 17:00) (18 - 47)  SpO2: 96% (2018 17:00) (90% - 99%)  Daily     Daily Weight in k.8 (2018 14:15)I&O's Summary    2018 07:01  -  2018 07:00  --------------------------------------------------------  IN: 586 mL / OUT: 65 mL / NET: 521 mL    2018 08: 17:30  --------------------------------------------------------  IN: 50 mL / OUT: 2000 mL / NET: -1950 mL        GENERAL:  Appears stated age, well-groomed, well-nourished, no distress  HEENT:  NC/AT,  conjunctivae clear and pink, no thyromegaly, nodules, adenopathy, no JVD, sclera -anicteric  CHEST:  Full & symmetric excursion, no increased effort, breath sounds clear  HEART:  Regular rhythm, S1, S2, no murmur/rub/S3/S4, no abdominal bruit, no edema  ABDOMEN:  Soft, non-tender, non-distended, normoactive bowel sounds,  no masses ,no hepato-splenomegaly, no signs of chronic liver disease  EXTEREMITIES:  no cyanosis,clubbing or edema  SKIN:  No rash/erythema/ecchymoses/petechiae/wounds/abscess/warm/dry  NEURO:  Alert, oriented, no asterixis, no tremor, no encephalopathy      LABS:                        9.5    10.11 )-----------( 127      ( 2018 05:35 )             30.6         135  |  96  |  53<H>  ----------------------------<  171<H>  4.1   |  29  |  5.40<H>    Ca    8.8      2018 05:35  Phos  3.3       Mg     2.4         TPro  6.3  /  Alb  1.7<L>  /  TBili  2.1<H>  /  DBili  x   /  AST  39<H>  /  ALT  68  /  AlkPhos  403<H>      PT/INR - ( 2018 05:35 )   PT: 13.3 sec;   INR: 1.16 ratio             amylaseAmylase, Serum Total: 19 U/L (10-30 @ 05:49)     lipaseLipase, Serum: 51 U/L (10-30 @ 05:49)    RADIOLOGY & ADDITIONAL TESTS:

## 2018-11-03 NOTE — PROGRESS NOTE ADULT - SUBJECTIVE AND OBJECTIVE BOX
Patient is a 68y old  Female who presents with a chief complaint of Sepsis (03 Nov 2018 12:08)        HPI:  Hx obtained from daughter  Patient is a 68y old female pmhx CAD s/p stent (2007), ESRD on HD (MWF; missed today's session), DM, HTN, HLD brought in with complaints of constant "hard pressure like" abdominal pain, nausea, vomiting, fever  for 2 days duration. Watery nonbloody nonfoul smelling diarrhea x 7 yesterday and 1 episode this am and NBNB vomiting x 7-8 yesterday and once today. For the past couple weeks pt has been having pain after meals, so has had a decreased PO intake. Did not trying anything for the pain and has never experienced this in the past. Endorses generalized weakness, fevers, chills. Denies recent abx usage, sick contacts, bloody stool, CP, SOB, dysuria. Pt has dialysis catheter changed 2 days ago back after it was accidently pulled out. Last dialysis session was Friday.     Upon arrival to ED patient febrile at 102.9, tachycardiac at 104 and normotensive and labs significant for WBC 23.96 30% bands elevated transaminitis, elevated tbili and elevated lactate x2, Cr 5.4.  procal elevated. UA neg Since presentation BP steadily dropping. BP dropping originally  now with SBP 98. EKG: sinus tach  CT chest abd pelvis: acute cholecystitis  RVP:neg  CXR: increased congestions  Patient given vancomycin and zosyn, tylenol and 2L IVF. (29 Oct 2018 18:49)      SUBJECTIVE & OBJECTIVE: Pt seen and examined at bedside.     PHYSICAL EXAM:  T(C): 36.7 (11-03-18 @ 11:58), Max: 37.3 (11-02-18 @ 15:40)  HR: 71 (11-03-18 @ 14:00) (63 - 74)  BP: 125/63 (11-03-18 @ 14:00) (101/55 - 146/66)  RR: 23 (11-03-18 @ 14:00) (17 - 38)  SpO2: 95% (11-03-18 @ 14:00) (90% - 99%)  Wt(kg): --   GENERAL: NAD, well-groomed, well-developed  HEAD:  Atraumatic, Normocephalic  EYES: EOMI, PERRLA, conjunctiva and sclera clear  ENMT: Moist mucous membranes  NECK: Supple, No JVD  NERVOUS SYSTEM:  Alert & Oriented X3, Motor Strength 5/5 B/L upper and lower extremities; DTRs 2+ intact and symmetric  CHEST/LUNG: Clear to auscultation bilaterally; No rales, rhonchi, wheezing, or rubs  HEART: Regular rate and rhythm; No murmurs, rubs, or gallops  ABDOMEN: Soft, Nontender, Nondistended; Bowel sounds present  EXTREMITIES:  2+ Peripheral Pulses, No clubbing, cyanosis, or edema        MEDICATIONS  (STANDING):  aspirin enteric coated 81 milliGRAM(s) Oral daily  atorvastatin 40 milliGRAM(s) Oral at bedtime  calcium carbonate 1250 mG  + Vitamin D (OsCal 500 + D) 1 Tablet(s) Oral daily  clopidogrel Tablet 75 milliGRAM(s) Oral daily  dextrose 5%. 1000 milliLiter(s) (50 mL/Hr) IV Continuous <Continuous>  dextrose 50% Injectable 12.5 Gram(s) IV Push once  dextrose 50% Injectable 25 Gram(s) IV Push once  dextrose 50% Injectable 25 Gram(s) IV Push once  docusate sodium 100 milliGRAM(s) Oral three times a day  ertapenem  IVPB      ertapenem  IVPB 500 milliGRAM(s) IV Intermittent every 24 hours  ertapenem  IVPB 150 milliGRAM(s) IV Intermittent once  erythromycin     base Tablet 250 milliGRAM(s) Oral every 6 hours  ferrous    sulfate 325 milliGRAM(s) Oral three times a day  gabapentin 300 milliGRAM(s) Oral daily  heparin  Injectable 5000 Unit(s) SubCutaneous every 8 hours  insulin lispro (HumaLOG) corrective regimen sliding scale   SubCutaneous Before meals and at bedtime  isosorbide   mononitrate ER Tablet (IMDUR) 30 milliGRAM(s) Oral every 24 hours  isosorbide   mononitrate ER Tablet (IMDUR) 60 milliGRAM(s) Oral <User Schedule>  metoprolol tartrate 50 milliGRAM(s) Oral two times a day  NIFEdipine XL 60 milliGRAM(s) Oral daily  senna 2 Tablet(s) Oral at bedtime  ursodiol Capsule 300 milliGRAM(s) Oral every 12 hours    MEDICATIONS  (PRN):  acetaminophen   Tablet .. 650 milliGRAM(s) Oral every 6 hours PRN Mild Pain (1 - 3)  dextrose 40% Gel 15 Gram(s) Oral once PRN Blood Glucose LESS THAN 70 milliGRAM(s)/deciliter  glucagon  Injectable 1 milliGRAM(s) IntraMuscular once PRN Glucose LESS THAN 70 milligrams/deciliter  HYDROmorphone  Injectable 0.25 milliGRAM(s) IV Push every 4 hours PRN Moderate Pain (4 - 6)  HYDROmorphone  Injectable 0.5 milliGRAM(s) IV Push every 4 hours PRN Severe Pain (7 - 10)  simethicone 80 milliGRAM(s) Chew every 6 hours PRN abdominal bloating      LABS:                        9.5    10.11 )-----------( 127      ( 03 Nov 2018 05:35 )             30.6     11-03    135  |  96  |  53<H>  ----------------------------<  171<H>  4.1   |  29  |  5.40<H>    Ca    8.8      03 Nov 2018 05:35  Phos  3.3     11-03  Mg     2.4     11-03    TPro  6.3  /  Alb  1.7<L>  /  TBili  2.1<H>  /  DBili  x   /  AST  39<H>  /  ALT  68  /  AlkPhos  403<H>  11-03    PT/INR - ( 03 Nov 2018 05:35 )   PT: 13.3 sec;   INR: 1.16 ratio             Magnesium, Serum: 2.4 mg/dL (11-03 @ 05:35)    CAPILLARY BLOOD GLUCOSE      POCT Blood Glucose.: 108 mg/dL (03 Nov 2018 12:58)  POCT Blood Glucose.: 201 mg/dL (03 Nov 2018 08:24)  POCT Blood Glucose.: 196 mg/dL (02 Nov 2018 22:03)  POCT Blood Glucose.: 167 mg/dL (02 Nov 2018 17:44)      CAPILLARY BLOOD GLUCOSE      POCT Blood Glucose.: 108 mg/dL (03 Nov 2018 12:58)  POCT Blood Glucose.: 201 mg/dL (03 Nov 2018 08:24)  POCT Blood Glucose.: 196 mg/dL (02 Nov 2018 22:03)  POCT Blood Glucose.: 167 mg/dL (02 Nov 2018 17:44)    CAPILLARY BLOOD GLUCOSE      POCT Blood Glucose.: 108 mg/dL (03 Nov 2018 12:58)    ABG - ( 02 Nov 2018 10:29 )  pH, Arterial: 7.32  pH, Blood: x     /  pCO2: 51    /  pO2: 79    / HCO3: 24    / Base Excess: 0.3   /  SaO2: 93                      RECENT CULTURES:      RADIOLOGY & ADDITIONAL TESTS:                        DVT/GI ppx  Discussed with pt @ bedside

## 2018-11-04 LAB
ANION GAP SERPL CALC-SCNC: 9 MMOL/L — SIGNIFICANT CHANGE UP (ref 5–17)
BUN SERPL-MCNC: 37 MG/DL — HIGH (ref 7–23)
CALCIUM SERPL-MCNC: 8.4 MG/DL — LOW (ref 8.5–10.1)
CHLORIDE SERPL-SCNC: 96 MMOL/L — SIGNIFICANT CHANGE UP (ref 96–108)
CO2 SERPL-SCNC: 28 MMOL/L — SIGNIFICANT CHANGE UP (ref 22–31)
CREAT SERPL-MCNC: 4.3 MG/DL — HIGH (ref 0.5–1.3)
CULTURE RESULTS: SIGNIFICANT CHANGE UP
GLUCOSE SERPL-MCNC: 182 MG/DL — HIGH (ref 70–99)
GRAM STN FLD: SIGNIFICANT CHANGE UP
HCT VFR BLD CALC: 29.3 % — LOW (ref 34.5–45)
HCT VFR BLD CALC: 30.8 % — LOW (ref 34.5–45)
HGB BLD-MCNC: 9.2 G/DL — LOW (ref 11.5–15.5)
HGB BLD-MCNC: 9.5 G/DL — LOW (ref 11.5–15.5)
MAGNESIUM SERPL-MCNC: 2.2 MG/DL — SIGNIFICANT CHANGE UP (ref 1.6–2.6)
MCHC RBC-ENTMCNC: 26.6 PG — LOW (ref 27–34)
MCHC RBC-ENTMCNC: 26.7 PG — LOW (ref 27–34)
MCHC RBC-ENTMCNC: 30.8 GM/DL — LOW (ref 32–36)
MCHC RBC-ENTMCNC: 31.4 GM/DL — LOW (ref 32–36)
MCV RBC AUTO: 85.2 FL — SIGNIFICANT CHANGE UP (ref 80–100)
MCV RBC AUTO: 86.3 FL — SIGNIFICANT CHANGE UP (ref 80–100)
NRBC # BLD: 0 /100 WBCS — SIGNIFICANT CHANGE UP (ref 0–0)
NRBC # BLD: 0 /100 WBCS — SIGNIFICANT CHANGE UP (ref 0–0)
ORGANISM # SPEC MICROSCOPIC CNT: SIGNIFICANT CHANGE UP
ORGANISM # SPEC MICROSCOPIC CNT: SIGNIFICANT CHANGE UP
PHOSPHATE SERPL-MCNC: 2.6 MG/DL — SIGNIFICANT CHANGE UP (ref 2.5–4.5)
PLATELET # BLD AUTO: 158 K/UL — SIGNIFICANT CHANGE UP (ref 150–400)
PLATELET # BLD AUTO: 174 K/UL — SIGNIFICANT CHANGE UP (ref 150–400)
POTASSIUM SERPL-MCNC: 3.8 MMOL/L — SIGNIFICANT CHANGE UP (ref 3.5–5.3)
POTASSIUM SERPL-SCNC: 3.8 MMOL/L — SIGNIFICANT CHANGE UP (ref 3.5–5.3)
RBC # BLD: 3.44 M/UL — LOW (ref 3.8–5.2)
RBC # BLD: 3.57 M/UL — LOW (ref 3.8–5.2)
RBC # FLD: 16.5 % — HIGH (ref 10.3–14.5)
RBC # FLD: 16.7 % — HIGH (ref 10.3–14.5)
SODIUM SERPL-SCNC: 133 MMOL/L — LOW (ref 135–145)
SPECIMEN SOURCE: SIGNIFICANT CHANGE UP
SPECIMEN SOURCE: SIGNIFICANT CHANGE UP
WBC # BLD: 10.32 K/UL — SIGNIFICANT CHANGE UP (ref 3.8–10.5)
WBC # BLD: 10.82 K/UL — HIGH (ref 3.8–10.5)
WBC # FLD AUTO: 10.32 K/UL — SIGNIFICANT CHANGE UP (ref 3.8–10.5)
WBC # FLD AUTO: 10.82 K/UL — HIGH (ref 3.8–10.5)

## 2018-11-04 PROCEDURE — 99233 SBSQ HOSP IP/OBS HIGH 50: CPT

## 2018-11-04 PROCEDURE — 99232 SBSQ HOSP IP/OBS MODERATE 35: CPT

## 2018-11-04 RX ADMIN — HEPARIN SODIUM 5000 UNIT(S): 5000 INJECTION INTRAVENOUS; SUBCUTANEOUS at 21:44

## 2018-11-04 RX ADMIN — ATORVASTATIN CALCIUM 40 MILLIGRAM(S): 80 TABLET, FILM COATED ORAL at 21:44

## 2018-11-04 RX ADMIN — Medication 325 MILLIGRAM(S): at 13:01

## 2018-11-04 RX ADMIN — ISOSORBIDE MONONITRATE 30 MILLIGRAM(S): 60 TABLET, EXTENDED RELEASE ORAL at 17:20

## 2018-11-04 RX ADMIN — Medication 2: at 21:46

## 2018-11-04 RX ADMIN — SENNA PLUS 2 TABLET(S): 8.6 TABLET ORAL at 21:43

## 2018-11-04 RX ADMIN — HEPARIN SODIUM 5000 UNIT(S): 5000 INJECTION INTRAVENOUS; SUBCUTANEOUS at 05:54

## 2018-11-04 RX ADMIN — Medication 100 MILLIGRAM(S): at 13:01

## 2018-11-04 RX ADMIN — Medication 81 MILLIGRAM(S): at 11:19

## 2018-11-04 RX ADMIN — Medication 50 MILLIGRAM(S): at 17:20

## 2018-11-04 RX ADMIN — Medication 325 MILLIGRAM(S): at 21:44

## 2018-11-04 RX ADMIN — URSODIOL 300 MILLIGRAM(S): 250 TABLET, FILM COATED ORAL at 17:20

## 2018-11-04 RX ADMIN — CLOPIDOGREL BISULFATE 75 MILLIGRAM(S): 75 TABLET, FILM COATED ORAL at 11:19

## 2018-11-04 RX ADMIN — ERTAPENEM SODIUM 100 MILLIGRAM(S): 1 INJECTION, POWDER, LYOPHILIZED, FOR SOLUTION INTRAMUSCULAR; INTRAVENOUS at 17:20

## 2018-11-04 RX ADMIN — Medication 325 MILLIGRAM(S): at 05:54

## 2018-11-04 RX ADMIN — HEPARIN SODIUM 5000 UNIT(S): 5000 INJECTION INTRAVENOUS; SUBCUTANEOUS at 13:01

## 2018-11-04 RX ADMIN — HYDROMORPHONE HYDROCHLORIDE 0.5 MILLIGRAM(S): 2 INJECTION INTRAMUSCULAR; INTRAVENOUS; SUBCUTANEOUS at 22:40

## 2018-11-04 RX ADMIN — ISOSORBIDE MONONITRATE 60 MILLIGRAM(S): 60 TABLET, EXTENDED RELEASE ORAL at 05:54

## 2018-11-04 RX ADMIN — Medication 4: at 17:20

## 2018-11-04 RX ADMIN — Medication 50 MILLIGRAM(S): at 05:55

## 2018-11-04 RX ADMIN — URSODIOL 300 MILLIGRAM(S): 250 TABLET, FILM COATED ORAL at 05:54

## 2018-11-04 RX ADMIN — Medication 4: at 12:53

## 2018-11-04 RX ADMIN — Medication 100 MILLIGRAM(S): at 05:54

## 2018-11-04 RX ADMIN — HYDROMORPHONE HYDROCHLORIDE 0.5 MILLIGRAM(S): 2 INJECTION INTRAMUSCULAR; INTRAVENOUS; SUBCUTANEOUS at 22:22

## 2018-11-04 RX ADMIN — Medication 100 MILLIGRAM(S): at 21:44

## 2018-11-04 RX ADMIN — Medication 1 TABLET(S): at 11:19

## 2018-11-04 RX ADMIN — GABAPENTIN 300 MILLIGRAM(S): 400 CAPSULE ORAL at 11:20

## 2018-11-04 RX ADMIN — Medication 60 MILLIGRAM(S): at 11:19

## 2018-11-04 RX ADMIN — Medication 4: at 08:29

## 2018-11-04 NOTE — PROGRESS NOTE ADULT - ASSESSMENT
Patient is now day #3 status post cholecystotomy tube for biliary sepsis. She had rapid atrial fibrillation on 10/30 and converted to sinus rhythm. She required pressor therapy which has now been discontinued. Her white blood count improved today remains hemodynamically stable. She has chronic renal failure on hemodialysis. Her elevated troponin level may represent a component of subendocardial ischemia without true atherothrombosis. These results are nonspecific in the setting of her kidney disease. Currently afebrile and arousable with no evidence of decompensated heart failure or active ischemic    Recommend:  Abx per ID    #1 CAD/ HTN  cont ASA and Plavix   cont Lopressor w/ hold parameters ( BP stable as per flow sheet)  cont Statin   cont Imdur   cont Procardia   cont on tele, SR w/ no events no AF reported   euvolemic on exam   fluid removal as per renal with HD       #2 Septic  chock   Plan: 2/2 acute calculus cholecystitis with biliary dilatation with common bile duct stone.   Blood Cx + for ESBL. E.coli antibx switched to Ertapenem. ID  f/u  downgraded from ICU   cont IV ABx as per ID, GI f/u    #3ESRD (end stage renal disease) on dialysis.  renal f/u   fluid removal as per renal with HD       DVT prophylaxis w/ heparin subcut  monitor and replete lytes, keep K>4, Mg>2    Other cardiovascular workup will depend on clinical course.  All other workup per primary team  Will cont follow     Nikia Alarcon NP  Cardiology Patient is now day #3 status post cholecystotomy tube for biliary sepsis. She had rapid atrial fibrillation on 10/30 and converted to sinus rhythm. She required pressor therapy which has now been discontinued. Her white blood count improved today remains hemodynamically stable. She has chronic renal failure on hemodialysis. Her elevated troponin level may represent a component of subendocardial ischemia without true atherothrombosis. These results are nonspecific in the setting of her kidney disease. Currently afebrile and arousable with no evidence of decompensated heart failure or active ischemic    #1 CAD/ HTN  cont ASA and Plavix   cont Lopressor w/ hold parameters ( BP stable as per flow sheet)  cont Statin   cont Imdur   cont Procardia   cont on tele, SR w/ no events no AF reported   euvolemic on exam   fluid removal as per renal with HD       #2 Septic  chock   Plan: 2/2 acute calculus cholecystitis with biliary dilatation with common bile duct stone.   Blood Cx + for ESBL. E.coli antibx switched to Ertapenem. ID  f/u  downgraded from ICU   cont IV ABx as per ID, GI f/u    #3 ESRD (end stage renal disease) on dialysis.  renal f/u   fluid removal as per renal with HD       DVT prophylaxis w/ heparin subcut  monitor and replete lytes, keep K>4, Mg>2    Other cardiovascular workup will depend on clinical course.  All other workup per primary team  Will cont follow     Nikia Alarcon NP  Cardiology

## 2018-11-04 NOTE — PROGRESS NOTE ADULT - SUBJECTIVE AND OBJECTIVE BOX
Patient is a 68y old  Female who presents with a chief complaint of Sepsis (04 Nov 2018 08:54)        HPI:  Hx obtained from daughter  Patient is a 68y old female pmhx CAD s/p stent (2007), ESRD on HD (MWF; missed today's session), DM, HTN, HLD brought in with complaints of constant "hard pressure like" abdominal pain, nausea, vomiting, fever  for 2 days duration. Watery nonbloody nonfoul smelling diarrhea x 7 yesterday and 1 episode this am and NBNB vomiting x 7-8 yesterday and once today. For the past couple weeks pt has been having pain after meals, so has had a decreased PO intake. Did not trying anything for the pain and has never experienced this in the past. Endorses generalized weakness, fevers, chills. Denies recent abx usage, sick contacts, bloody stool, CP, SOB, dysuria. Pt has dialysis catheter changed 2 days ago back after it was accidently pulled out. Last dialysis session was Friday.     Upon arrival to ED patient febrile at 102.9, tachycardiac at 104 and normotensive and labs significant for WBC 23.96 30% bands elevated transaminitis, elevated tbili and elevated lactate x2, Cr 5.4.  procal elevated. UA neg Since presentation BP steadily dropping. BP dropping originally  now with SBP 98. EKG: sinus tach  CT chest abd pelvis: acute cholecystitis  RVP:neg  CXR: increased congestions  Patient given vancomycin and zosyn, tylenol and 2L IVF. (29 Oct 2018 18:49)      SUBJECTIVE & OBJECTIVE: Pt seen and examined at bedside, no acute complaints     PHYSICAL EXAM:  T(C): 36.8 (11-04-18 @ 14:13), Max: 37.2 (11-03-18 @ 16:01)  HR: 70 (11-04-18 @ 14:13) (68 - 75)  BP: 130/69 (11-04-18 @ 14:13) (107/55 - 130/69)  RR: 17 (11-04-18 @ 14:13) (17 - 47)  SpO2: 93% (11-04-18 @ 14:13) (93% - 100%)  Wt(kg): --   Weight (kg): 92.2 (11-03 @ 19:56)  GENERAL: NAD, well-groomed, well-developed  HEAD:  Atraumatic, Normocephalic  EYES: EOMI, PERRLA, conjunctiva and sclera clear  ENMT: Moist mucous membranes  NECK: Supple, No JVD  NERVOUS SYSTEM:  Alert & Oriented X3,   CHEST/LUNG: Clear to auscultation bilaterally; No rales, rhonchi, wheezing, or rubs  HEART: Regular rate and rhythm; No murmurs, rubs, or gallops  ABDOMEN: Soft, Nontender, Nondistended; Bowel sounds present  EXTREMITIES:  2+ Peripheral Pulses, No clubbing, cyanosis, or edema        MEDICATIONS  (STANDING):  aspirin enteric coated 81 milliGRAM(s) Oral daily  atorvastatin 40 milliGRAM(s) Oral at bedtime  calcium carbonate 1250 mG  + Vitamin D (OsCal 500 + D) 1 Tablet(s) Oral daily  clopidogrel Tablet 75 milliGRAM(s) Oral daily  dextrose 5%. 1000 milliLiter(s) (50 mL/Hr) IV Continuous <Continuous>  dextrose 50% Injectable 12.5 Gram(s) IV Push once  dextrose 50% Injectable 25 Gram(s) IV Push once  dextrose 50% Injectable 25 Gram(s) IV Push once  docusate sodium 100 milliGRAM(s) Oral three times a day  ertapenem  IVPB 500 milliGRAM(s) IV Intermittent every 24 hours  ferrous    sulfate 325 milliGRAM(s) Oral three times a day  gabapentin 300 milliGRAM(s) Oral daily  heparin  Injectable 5000 Unit(s) SubCutaneous every 8 hours  insulin lispro (HumaLOG) corrective regimen sliding scale   SubCutaneous Before meals and at bedtime  isosorbide   mononitrate ER Tablet (IMDUR) 30 milliGRAM(s) Oral every 24 hours  isosorbide   mononitrate ER Tablet (IMDUR) 60 milliGRAM(s) Oral <User Schedule>  metoprolol tartrate 50 milliGRAM(s) Oral two times a day  NIFEdipine XL 60 milliGRAM(s) Oral daily  senna 2 Tablet(s) Oral at bedtime  ursodiol Capsule 300 milliGRAM(s) Oral every 12 hours    MEDICATIONS  (PRN):  acetaminophen   Tablet .. 650 milliGRAM(s) Oral every 6 hours PRN Mild Pain (1 - 3)  dextrose 40% Gel 15 Gram(s) Oral once PRN Blood Glucose LESS THAN 70 milliGRAM(s)/deciliter  glucagon  Injectable 1 milliGRAM(s) IntraMuscular once PRN Glucose LESS THAN 70 milligrams/deciliter  HYDROmorphone  Injectable 0.25 milliGRAM(s) IV Push every 4 hours PRN Moderate Pain (4 - 6)  HYDROmorphone  Injectable 0.5 milliGRAM(s) IV Push every 4 hours PRN Severe Pain (7 - 10)  simethicone 80 milliGRAM(s) Chew every 6 hours PRN abdominal bloating      LABS:                        9.5    10.82 )-----------( 158      ( 04 Nov 2018 10:19 )             30.8     11-04    133<L>  |  96  |  37<H>  ----------------------------<  182<H>  3.8   |  28  |  4.30<H>    Ca    8.4<L>      04 Nov 2018 10:19  Phos  2.6     11-04  Mg     2.2     11-04    TPro  6.3  /  Alb  1.7<L>  /  TBili  2.1<H>  /  DBili  x   /  AST  39<H>  /  ALT  68  /  AlkPhos  403<H>  11-03    PT/INR - ( 03 Nov 2018 05:35 )   PT: 13.3 sec;   INR: 1.16 ratio             Magnesium, Serum: 2.2 mg/dL (11-04 @ 10:19)    CAPILLARY BLOOD GLUCOSE      POCT Blood Glucose.: 233 mg/dL (04 Nov 2018 11:59)  POCT Blood Glucose.: 203 mg/dL (04 Nov 2018 08:07)  POCT Blood Glucose.: 191 mg/dL (03 Nov 2018 21:31)  POCT Blood Glucose.: 204 mg/dL (03 Nov 2018 17:50)  POCT Blood Glucose.: 210 mg/dL (03 Nov 2018 17:30)      CAPILLARY BLOOD GLUCOSE      POCT Blood Glucose.: 233 mg/dL (04 Nov 2018 11:59)  POCT Blood Glucose.: 203 mg/dL (04 Nov 2018 08:07)  POCT Blood Glucose.: 191 mg/dL (03 Nov 2018 21:31)  POCT Blood Glucose.: 204 mg/dL (03 Nov 2018 17:50)  POCT Blood Glucose.: 210 mg/dL (03 Nov 2018 17:30)    CAPILLARY BLOOD GLUCOSE      POCT Blood Glucose.: 233 mg/dL (04 Nov 2018 11:59)            RECENT CULTURES:      RADIOLOGY & ADDITIONAL TESTS:                        DVT/GI ppx  Discussed with pt @ bedside

## 2018-11-04 NOTE — PROGRESS NOTE ADULT - SUBJECTIVE AND OBJECTIVE BOX
[facility] -- Glynn Bullock, Susan, lCaudia, Morgan Sams Savella  Office # [phone]      Follow Up:  CAD    Subjective/Observations:   Patient seen and examined. Resting comfortably in bed in no acute distress.  No complaints of chest pain, dyspnea, or palpitations reported. No signs of orthopnea or PND.         REVIEW OF SYSTEMS: All other review of systems is negative unless indicated above    PAST MEDICAL & SURGICAL HISTORY:  ESRD (end stage renal disease) on dialysis: MWF  CAD (coronary artery disease): s/p stent in 2007  Diabetes  Myocardial infarction  Hypertension  H/O: hysterectomy      MEDICATIONS  (STANDING):  aspirin enteric coated 81 milliGRAM(s) Oral daily  atorvastatin 40 milliGRAM(s) Oral at bedtime  calcium carbonate 1250 mG  + Vitamin D (OsCal 500 + D) 1 Tablet(s) Oral daily  clopidogrel Tablet 75 milliGRAM(s) Oral daily  dextrose 5%. 1000 milliLiter(s) (50 mL/Hr) IV Continuous <Continuous>  dextrose 50% Injectable 12.5 Gram(s) IV Push once  dextrose 50% Injectable 25 Gram(s) IV Push once  dextrose 50% Injectable 25 Gram(s) IV Push once  docusate sodium 100 milliGRAM(s) Oral three times a day  ertapenem  IVPB 500 milliGRAM(s) IV Intermittent every 24 hours  ferrous    sulfate 325 milliGRAM(s) Oral three times a day  gabapentin 300 milliGRAM(s) Oral daily  heparin  Injectable 5000 Unit(s) SubCutaneous every 8 hours  insulin lispro (HumaLOG) corrective regimen sliding scale   SubCutaneous Before meals and at bedtime  isosorbide   mononitrate ER Tablet (IMDUR) 30 milliGRAM(s) Oral every 24 hours  isosorbide   mononitrate ER Tablet (IMDUR) 60 milliGRAM(s) Oral <User Schedule>  metoprolol tartrate 50 milliGRAM(s) Oral two times a day  NIFEdipine XL 60 milliGRAM(s) Oral daily  senna 2 Tablet(s) Oral at bedtime  ursodiol Capsule 300 milliGRAM(s) Oral every 12 hours    MEDICATIONS  (PRN):  acetaminophen   Tablet .. 650 milliGRAM(s) Oral every 6 hours PRN Mild Pain (1 - 3)  dextrose 40% Gel 15 Gram(s) Oral once PRN Blood Glucose LESS THAN 70 milliGRAM(s)/deciliter  glucagon  Injectable 1 milliGRAM(s) IntraMuscular once PRN Glucose LESS THAN 70 milligrams/deciliter  HYDROmorphone  Injectable 0.25 milliGRAM(s) IV Push every 4 hours PRN Moderate Pain (4 - 6)  HYDROmorphone  Injectable 0.5 milliGRAM(s) IV Push every 4 hours PRN Severe Pain (7 - 10)  simethicone 80 milliGRAM(s) Chew every 6 hours PRN abdominal bloating      Allergies    No Known Drug Allergies  Purell (Rash)    Intolerances            Vital Signs Last 24 Hrs  T(C): 36.6 (04 Nov 2018 04:51), Max: 37.2 (03 Nov 2018 16:01)  T(F): 97.8 (04 Nov 2018 04:51), Max: 99 (03 Nov 2018 16:01)  HR: 70 (04 Nov 2018 04:51) (63 - 75)  BP: 112/64 (04 Nov 2018 04:51) (101/55 - 130/84)  BP(mean): 81 (03 Nov 2018 18:00) (73 - 99)  RR: 18 (04 Nov 2018 05:51) (18 - 47)  SpO2: 100% (04 Nov 2018 04:51) (95% - 100%)    I&O's Summary    03 Nov 2018 08:01  -  04 Nov 2018 07:00  --------------------------------------------------------  IN: 698 mL / OUT: 2010 mL / NET: -1312 mL      Weight (kg): 92.2 (11-03 @ 19:56)    PHYSICAL EXAM:  TELE: SR 60, no events   Constitutional: NAD, awake and alert, well-developed, obese   HEENT: Moist Mucous Membranes, Anicteric  Pulmonary: Non-labored, breath sounds are decreased bilaterally, No wheezing, rales or rhonchi  Cardiovascular: Regular, S1 and S2, No murmurs, rubs, gallops or clicks  Gastrointestinal: Bowel Sounds present, soft, nontender.   Lymph: No peripheral edema. No lymphadenopathy.  Skin: No visible rashes or ulcers. R upper chest wall Shiley in place for HD   Psych:  Mood & affect appropriate    LABS: All Labs Reviewed:                        9.5    10.11 )-----------( 127      ( 03 Nov 2018 05:35 )             30.6                         10.6   12.32 )-----------( 127      ( 02 Nov 2018 08:01 )             35.3     03 Nov 2018 05:35    135    |  96     |  53     ----------------------------<  171    4.1     |  29     |  5.40   02 Nov 2018 08:01    135    |  97     |  33     ----------------------------<  159    4.0     |  28     |  4.00     Ca    8.8        03 Nov 2018 05:35  Ca    8.5        02 Nov 2018 08:01  Phos  3.3       03 Nov 2018 05:35  Phos  4.2       02 Nov 2018 08:01  Mg     2.4       03 Nov 2018 05:35  Mg     2.2       02 Nov 2018 08:01    TPro  6.3    /  Alb  1.7    /  TBili  2.1    /  DBili  x      /  AST  39     /  ALT  68     /  AlkPhos  403    03 Nov 2018 05:35  TPro  7.0    /  Alb  1.9    /  TBili  2.9    /  DBili  x      /  AST  49     /  ALT  101    /  AlkPhos  358    02 Nov 2018 08:01    PT/INR - ( 03 Nov 2018 05:35 )   PT: 13.3 sec;   INR: 1.16 ratio      < from: 12 Lead ECG (11.02.18 @ 08:59) >  Ventricular Rate 72 BPM    Atrial Rate 72 BPM    P-R Interval 156 ms    QRS Duration 156 ms    Q-T Interval 444 ms    QTC Calculation(Bezet) 486 ms    P Axis 50 degrees    R Axis 90 degrees    T Axis -14 degrees    Diagnosis Line Normal sinus rhythm  Right bundle branch block  Inferior infarct (cited on or before 31-OCT-2018)  Abnormal ECG    Confirmed by Jovanna Hunter ([id number]) on 11/3/2018 11:50:16 AM    < end of copied text >       < from: Xray Abdomen 1 View PORTABLE -Routine (11.02.18 @ 11:59) >  EXAM:  XR ABDOMEN PORTABLE ROUTINE 1V                            PROCEDURE DATE:  11/02/2018          INTERPRETATION:  EXAM: KUB.     CLINICAL INDICATION: Follow-up evaluation of gastric distention.     TECHNIQUE: 2 AP supine views.     PRIOR EXAM:11/01/2018.     FINDINGS:      Moderate gastric distention is essentially unchanged. No small bowel or   colonic distention is seen. A cholecystostomy tube is again evident. A   central venous catheter is redemonstrated terminating in the region of   right atrium. There is evidence of multilevel thoracolumbar spondylosis.      IMPRESSION:     No significant change in the appearance of gastric distention.    < end of copied text >    < from: Xray Chest 1 View-PORTABLE IMMEDIATE (10.31.18 @ 21:52) >  EXAM:  XR CHEST PORTABLE IMMED 1V                            PROCEDURE DATE:  10/31/2018          INTERPRETATION:  Chest portable.    Clinical History: Nasogastric tube placement.    Comparison: 10/30/2018.    Single AP view submitted.    Patient is rotated.    Nasogastric tube is present, tip below the level of the hemidiaphragm.    Again noted is right-sided dialysis catheter.  The left-sided central venous catheter appears to been removed.    The evaluation of the cardiomediastinal silhouette is limited on portable   technique.    Low lung volumes are present.  Again noted is perihilar airspace consolidation, most pronounced in the   right.  There are probable small bilateral pleural effusions.    Impression:    Findings as discussed above.    < end of copied text >

## 2018-11-04 NOTE — PROGRESS NOTE ADULT - SUBJECTIVE AND OBJECTIVE BOX
INTERVAL HPI/OVERNIGHT EVENTS:  No new overnight event.  No N/V/D.  Tolerating diet.    Allergies    No Known Drug Allergies  Purell (Rash)    Intolerances          General:  No wt loss, fevers, chills, night sweats, fatigue,   Eyes:  Good vision, no reported pain  ENT:  No sore throat, pain, runny nose, dysphagia  CV:  No pain, palpitations, hypo/hypertension  Resp:  No dyspnea, cough, tachypnea, wheezing  GI:  No pain, No nausea, No vomiting, No diarrhea, No constipation, No weight loss, No fever, No pruritis, No rectal bleeding, No tarry stools, No dysphagia,  :  No pain, bleeding, incontinence, nocturia  Muscle:  No pain, weakness  Neuro:  No weakness, tingling, memory problems  Psych:  No fatigue, insomnia, mood problems, depression  Endocrine:  No polyuria, polydipsia, cold/heat intolerance  Heme:  No petechiae, ecchymosis, easy bruisability  Skin:  No rash, tattoos, scars, edema      PHYSICAL EXAM:   Vital Signs:  Vital Signs Last 24 Hrs  T(C): 36.8 (04 Nov 2018 14:13), Max: 37.2 (03 Nov 2018 16:01)  T(F): 98.2 (04 Nov 2018 14:13), Max: 99 (03 Nov 2018 16:01)  HR: 70 (04 Nov 2018 14:13) (68 - 75)  BP: 130/69 (04 Nov 2018 14:13) (107/55 - 130/69)  BP(mean): 81 (03 Nov 2018 18:00) (77 - 85)  RR: 17 (04 Nov 2018 14:13) (17 - 47)  SpO2: 93% (04 Nov 2018 14:13) (93% - 100%)  Daily     Daily I&O's Summary    03 Nov 2018 08:01  -  04 Nov 2018 07:00  --------------------------------------------------------  IN: 698 mL / OUT: 2010 mL / NET: -1312 mL        GENERAL:  Appears stated age, well-groomed, well-nourished, no distress  HEENT:  NC/AT,  conjunctivae clear and pink, no thyromegaly, nodules, adenopathy, no JVD, sclera -anicteric  CHEST:  Full & symmetric excursion, no increased effort, breath sounds clear  HEART:  Regular rhythm, S1, S2, no murmur/rub/S3/S4, no abdominal bruit, no edema  ABDOMEN:  Soft, non-tender, non-distended, normoactive bowel sounds,  no masses ,no hepato-splenomegaly, no signs of chronic liver disease ruq drain  EXTEREMITIES:  no cyanosis,clubbing or edema  SKIN:  No rash/erythema/ecchymoses/petechiae/wounds/abscess/warm/dry  NEURO:  Alert, oriented, no asterixis, no tremor, no encephalopathy      LABS:                        9.5    10.82 )-----------( 158      ( 04 Nov 2018 10:19 )             30.8     11-04    133<L>  |  96  |  37<H>  ----------------------------<  182<H>  3.8   |  28  |  4.30<H>    Ca    8.4<L>      04 Nov 2018 10:19  Phos  2.6     11-04  Mg     2.2     11-04    TPro  6.3  /  Alb  1.7<L>  /  TBili  2.1<H>  /  DBili  x   /  AST  39<H>  /  ALT  68  /  AlkPhos  403<H>  11-03    PT/INR - ( 03 Nov 2018 05:35 )   PT: 13.3 sec;   INR: 1.16 ratio             amylase   lipase  RADIOLOGY & ADDITIONAL TESTS:

## 2018-11-04 NOTE — PROGRESS NOTE ADULT - SUBJECTIVE AND OBJECTIVE BOX
Subjective: pt without complaints, tolerating regular diet with loose bowel movement.    Objective:  Vital Signs Last 24 Hrs  T(C): 36.8 (04 Nov 2018 14:13), Max: 37.2 (03 Nov 2018 16:01)  T(F): 98.2 (04 Nov 2018 14:13), Max: 99 (03 Nov 2018 16:01)  HR: 70 (04 Nov 2018 14:13) (68 - 75)  BP: 130/69 (04 Nov 2018 14:13) (107/55 - 130/69)  BP(mean): 81 (03 Nov 2018 18:00) (77 - 86)  RR: 17 (04 Nov 2018 14:13) (17 - 47)  SpO2: 93% (04 Nov 2018 14:13) (93% - 100%)    Heent: DONTE SAAVEDRA  Pul: decreased at bases  Cor: RSR, without murmurs  Abdomen: normal bowel sounds, without distension  Cholecystostomy tube- with bilious drainage  Extremities: without edema, motor/sensory intact, without calf pain, Homans sign negative.                        9.5    10.82 )-----------( 158      ( 04 Nov 2018 10:19 )             30.8       11-04    133<L>  |  96  |  37<H>  ----------------------------<  182<H>  3.8   |  28  |  4.30<H>    Ca    8.4<L>      04 Nov 2018 10:19  Phos  2.6     11-04  Mg     2.2     11-04    TPro  6.3  /  Alb  1.7<L>  /  TBili  2.1<H>  /  DBili  x   /  AST  39<H>  /  ALT  68  /  AlkPhos  403<H>  11-03 11-03 @ 08:01  -  11-04 @ 07:00  --------------------------------------------------------  IN:    Oral Fluid: 598 mL    Solution: 100 mL  Total IN: 698 mL    OUT:    Drain: 10 mL    Other: 2000 mL  Total OUT: 2010 mL    Total NET: -1312 mL

## 2018-11-05 LAB
ALBUMIN SERPL ELPH-MCNC: 1.7 G/DL — LOW (ref 3.3–5)
ALP SERPL-CCNC: 348 U/L — HIGH (ref 40–120)
ALT FLD-CCNC: 40 U/L — SIGNIFICANT CHANGE UP (ref 12–78)
ANION GAP SERPL CALC-SCNC: 12 MMOL/L — SIGNIFICANT CHANGE UP (ref 5–17)
AST SERPL-CCNC: 31 U/L — SIGNIFICANT CHANGE UP (ref 15–37)
BILIRUB SERPL-MCNC: 1.4 MG/DL — HIGH (ref 0.2–1.2)
BUN SERPL-MCNC: 57 MG/DL — HIGH (ref 7–23)
CALCIUM SERPL-MCNC: 8.5 MG/DL — SIGNIFICANT CHANGE UP (ref 8.5–10.1)
CHLORIDE SERPL-SCNC: 98 MMOL/L — SIGNIFICANT CHANGE UP (ref 96–108)
CO2 SERPL-SCNC: 25 MMOL/L — SIGNIFICANT CHANGE UP (ref 22–31)
CREAT SERPL-MCNC: 6.1 MG/DL — HIGH (ref 0.5–1.3)
CULTURE RESULTS: SIGNIFICANT CHANGE UP
GLUCOSE SERPL-MCNC: 159 MG/DL — HIGH (ref 70–99)
HCT VFR BLD CALC: 29.4 % — LOW (ref 34.5–45)
HGB BLD-MCNC: 8.9 G/DL — LOW (ref 11.5–15.5)
MCHC RBC-ENTMCNC: 26.3 PG — LOW (ref 27–34)
MCHC RBC-ENTMCNC: 30.3 GM/DL — LOW (ref 32–36)
MCV RBC AUTO: 87 FL — SIGNIFICANT CHANGE UP (ref 80–100)
NRBC # BLD: 0 /100 WBCS — SIGNIFICANT CHANGE UP (ref 0–0)
PLATELET # BLD AUTO: 213 K/UL — SIGNIFICANT CHANGE UP (ref 150–400)
POTASSIUM SERPL-MCNC: 4 MMOL/L — SIGNIFICANT CHANGE UP (ref 3.5–5.3)
POTASSIUM SERPL-SCNC: 4 MMOL/L — SIGNIFICANT CHANGE UP (ref 3.5–5.3)
PROT SERPL-MCNC: 6.3 G/DL — SIGNIFICANT CHANGE UP (ref 6–8.3)
RBC # BLD: 3.38 M/UL — LOW (ref 3.8–5.2)
RBC # FLD: 16.9 % — HIGH (ref 10.3–14.5)
SODIUM SERPL-SCNC: 135 MMOL/L — SIGNIFICANT CHANGE UP (ref 135–145)
SPECIMEN SOURCE: SIGNIFICANT CHANGE UP
WBC # BLD: 12 K/UL — HIGH (ref 3.8–10.5)
WBC # FLD AUTO: 12 K/UL — HIGH (ref 3.8–10.5)

## 2018-11-05 PROCEDURE — 99232 SBSQ HOSP IP/OBS MODERATE 35: CPT

## 2018-11-05 PROCEDURE — 99233 SBSQ HOSP IP/OBS HIGH 50: CPT

## 2018-11-05 RX ORDER — LANOLIN ALCOHOL/MO/W.PET/CERES
5 CREAM (GRAM) TOPICAL ONCE
Qty: 0 | Refills: 0 | Status: COMPLETED | OUTPATIENT
Start: 2018-11-04 | End: 2018-11-04

## 2018-11-05 RX ORDER — CHLORHEXIDINE GLUCONATE 213 G/1000ML
1 SOLUTION TOPICAL DAILY
Qty: 0 | Refills: 0 | Status: DISCONTINUED | OUTPATIENT
Start: 2018-11-05 | End: 2018-11-16

## 2018-11-05 RX ORDER — PANTOPRAZOLE SODIUM 20 MG/1
40 TABLET, DELAYED RELEASE ORAL
Qty: 0 | Refills: 0 | Status: DISCONTINUED | OUTPATIENT
Start: 2018-11-05 | End: 2018-11-16

## 2018-11-05 RX ADMIN — ISOSORBIDE MONONITRATE 30 MILLIGRAM(S): 60 TABLET, EXTENDED RELEASE ORAL at 17:30

## 2018-11-05 RX ADMIN — Medication 50 MILLIGRAM(S): at 06:19

## 2018-11-05 RX ADMIN — Medication 1 TABLET(S): at 12:21

## 2018-11-05 RX ADMIN — GABAPENTIN 300 MILLIGRAM(S): 400 CAPSULE ORAL at 12:21

## 2018-11-05 RX ADMIN — ERTAPENEM SODIUM 100 MILLIGRAM(S): 1 INJECTION, POWDER, LYOPHILIZED, FOR SOLUTION INTRAMUSCULAR; INTRAVENOUS at 17:31

## 2018-11-05 RX ADMIN — CLOPIDOGREL BISULFATE 75 MILLIGRAM(S): 75 TABLET, FILM COATED ORAL at 12:21

## 2018-11-05 RX ADMIN — Medication 50 MILLIGRAM(S): at 17:30

## 2018-11-05 RX ADMIN — Medication 81 MILLIGRAM(S): at 12:21

## 2018-11-05 RX ADMIN — HEPARIN SODIUM 5000 UNIT(S): 5000 INJECTION INTRAVENOUS; SUBCUTANEOUS at 21:59

## 2018-11-05 RX ADMIN — HEPARIN SODIUM 5000 UNIT(S): 5000 INJECTION INTRAVENOUS; SUBCUTANEOUS at 14:51

## 2018-11-05 RX ADMIN — URSODIOL 300 MILLIGRAM(S): 250 TABLET, FILM COATED ORAL at 06:19

## 2018-11-05 RX ADMIN — Medication 60 MILLIGRAM(S): at 12:22

## 2018-11-05 RX ADMIN — Medication 2: at 08:45

## 2018-11-05 RX ADMIN — Medication 100 MILLIGRAM(S): at 14:51

## 2018-11-05 RX ADMIN — Medication 4: at 21:59

## 2018-11-05 RX ADMIN — URSODIOL 300 MILLIGRAM(S): 250 TABLET, FILM COATED ORAL at 17:30

## 2018-11-05 RX ADMIN — Medication 325 MILLIGRAM(S): at 21:59

## 2018-11-05 RX ADMIN — ATORVASTATIN CALCIUM 40 MILLIGRAM(S): 80 TABLET, FILM COATED ORAL at 21:59

## 2018-11-05 RX ADMIN — Medication 100 MILLIGRAM(S): at 21:59

## 2018-11-05 RX ADMIN — Medication 4: at 12:23

## 2018-11-05 RX ADMIN — SENNA PLUS 2 TABLET(S): 8.6 TABLET ORAL at 21:59

## 2018-11-05 RX ADMIN — ISOSORBIDE MONONITRATE 60 MILLIGRAM(S): 60 TABLET, EXTENDED RELEASE ORAL at 06:20

## 2018-11-05 RX ADMIN — Medication 325 MILLIGRAM(S): at 14:51

## 2018-11-05 RX ADMIN — Medication 325 MILLIGRAM(S): at 06:19

## 2018-11-05 RX ADMIN — Medication 4: at 17:22

## 2018-11-05 RX ADMIN — Medication 100 MILLIGRAM(S): at 06:19

## 2018-11-05 RX ADMIN — HEPARIN SODIUM 5000 UNIT(S): 5000 INJECTION INTRAVENOUS; SUBCUTANEOUS at 06:23

## 2018-11-05 RX ADMIN — Medication 5 MILLIGRAM(S): at 00:32

## 2018-11-05 NOTE — PROGRESS NOTE ADULT - PROBLEM SELECTOR PLAN 2
Ac cholecystitis with gall stones, cholangitis and choledocholithiasis  S/P P/C cholecystoscopy and drainage   S/P MRCP no stone in CBD, FU GI,   improving in AST/ALT, c/w ursodiol.  fluid cx + for  E.coli switched to Ertapenem. ID  f/u  further management per ICU

## 2018-11-05 NOTE — PROGRESS NOTE ADULT - SUBJECTIVE AND OBJECTIVE BOX
Patient is a 68y old  Female who presents with a chief complaint of Sepsis (05 Nov 2018 12:14)        HPI:  Hx obtained from daughter  Patient is a 68y old female pmhx CAD s/p stent (2007), ESRD on HD (MWF; missed today's session), DM, HTN, HLD brought in with complaints of constant "hard pressure like" abdominal pain, nausea, vomiting, fever  for 2 days duration. Watery nonbloody nonfoul smelling diarrhea x 7 yesterday and 1 episode this am and NBNB vomiting x 7-8 yesterday and once today. For the past couple weeks pt has been having pain after meals, so has had a decreased PO intake. Did not trying anything for the pain and has never experienced this in the past. Endorses generalized weakness, fevers, chills. Denies recent abx usage, sick contacts, bloody stool, CP, SOB, dysuria. Pt has dialysis catheter changed 2 days ago back after it was accidently pulled out. Last dialysis session was Friday.     Upon arrival to ED patient febrile at 102.9, tachycardiac at 104 and normotensive and labs significant for WBC 23.96 30% bands elevated transaminitis, elevated tbili and elevated lactate x2, Cr 5.4.  procal elevated. UA neg Since presentation BP steadily dropping. BP dropping originally  now with SBP 98. EKG: sinus tach  CT chest abd pelvis: acute cholecystitis  RVP:neg  CXR: increased congestions  Patient given vancomycin and zosyn, tylenol and 2L IVF. (29 Oct 2018 18:49)      SUBJECTIVE & OBJECTIVE: Pt seen and examined at bedside, no acute complaints     PHYSICAL EXAM:  T(C): 36.6 (11-05-18 @ 13:40), Max: 37.2 (11-04-18 @ 20:55)  HR: 61 (11-05-18 @ 13:40) (58 - 71)  BP: 95/55 (11-05-18 @ 13:40) (95/55 - 129/54)  RR: 17 (11-05-18 @ 13:40) (17 - 18)  SpO2: 94% (11-05-18 @ 13:40) (92% - 100%)  Wt(kg): --   GENERAL: + lethargic   ENMT: Moist mucous membranes  NECK: Supple, No JVD  NERVOUS SYSTEM:  Alert & Oriented X3,  CHEST/LUNG: Clear to auscultation bilaterally; No rales, rhonchi, wheezing, or rubs  HEART: Regular rate and rhythm; No murmurs, rubs, or gallops  ABDOMEN: Soft, Nontender, Nondistended; Bowel sounds present  EXTREMITIES:  2+ edema        MEDICATIONS  (STANDING):  aspirin enteric coated 81 milliGRAM(s) Oral daily  atorvastatin 40 milliGRAM(s) Oral at bedtime  calcium carbonate 1250 mG  + Vitamin D (OsCal 500 + D) 1 Tablet(s) Oral daily  clopidogrel Tablet 75 milliGRAM(s) Oral daily  dextrose 5%. 1000 milliLiter(s) (50 mL/Hr) IV Continuous <Continuous>  dextrose 50% Injectable 12.5 Gram(s) IV Push once  dextrose 50% Injectable 25 Gram(s) IV Push once  dextrose 50% Injectable 25 Gram(s) IV Push once  docusate sodium 100 milliGRAM(s) Oral three times a day  ertapenem  IVPB 500 milliGRAM(s) IV Intermittent every 24 hours  ferrous    sulfate 325 milliGRAM(s) Oral three times a day  gabapentin 300 milliGRAM(s) Oral daily  heparin  Injectable 5000 Unit(s) SubCutaneous every 8 hours  insulin lispro (HumaLOG) corrective regimen sliding scale   SubCutaneous Before meals and at bedtime  isosorbide   mononitrate ER Tablet (IMDUR) 30 milliGRAM(s) Oral every 24 hours  isosorbide   mononitrate ER Tablet (IMDUR) 60 milliGRAM(s) Oral <User Schedule>  metoprolol tartrate 50 milliGRAM(s) Oral two times a day  NIFEdipine XL 60 milliGRAM(s) Oral daily  pantoprazole    Tablet 40 milliGRAM(s) Oral before breakfast  senna 2 Tablet(s) Oral at bedtime  ursodiol Capsule 300 milliGRAM(s) Oral every 12 hours    MEDICATIONS  (PRN):  acetaminophen   Tablet .. 650 milliGRAM(s) Oral every 6 hours PRN Mild Pain (1 - 3)  dextrose 40% Gel 15 Gram(s) Oral once PRN Blood Glucose LESS THAN 70 milliGRAM(s)/deciliter  glucagon  Injectable 1 milliGRAM(s) IntraMuscular once PRN Glucose LESS THAN 70 milligrams/deciliter  HYDROmorphone  Injectable 0.25 milliGRAM(s) IV Push every 4 hours PRN Moderate Pain (4 - 6)  HYDROmorphone  Injectable 0.5 milliGRAM(s) IV Push every 4 hours PRN Severe Pain (7 - 10)  simethicone 80 milliGRAM(s) Chew every 6 hours PRN abdominal bloating      LABS:                        8.9    12.00 )-----------( 213      ( 05 Nov 2018 09:56 )             29.4     11-05    135  |  98  |  57<H>  ----------------------------<  159<H>  4.0   |  25  |  6.10<H>    Ca    8.5      05 Nov 2018 09:56  Phos  2.6     11-04  Mg     2.2     11-04    TPro  6.3  /  Alb  1.7<L>  /  TBili  1.4<H>  /  DBili  x   /  AST  31  /  ALT  40  /  AlkPhos  348<H>  11-05          CAPILLARY BLOOD GLUCOSE      POCT Blood Glucose.: 205 mg/dL (05 Nov 2018 12:03)  POCT Blood Glucose.: 180 mg/dL (05 Nov 2018 08:29)  POCT Blood Glucose.: 198 mg/dL (04 Nov 2018 21:19)  POCT Blood Glucose.: 239 mg/dL (04 Nov 2018 17:20)      CAPILLARY BLOOD GLUCOSE      POCT Blood Glucose.: 205 mg/dL (05 Nov 2018 12:03)  POCT Blood Glucose.: 180 mg/dL (05 Nov 2018 08:29)  POCT Blood Glucose.: 198 mg/dL (04 Nov 2018 21:19)  POCT Blood Glucose.: 239 mg/dL (04 Nov 2018 17:20)    CAPILLARY BLOOD GLUCOSE      POCT Blood Glucose.: 205 mg/dL (05 Nov 2018 12:03)            RECENT CULTURES:      RADIOLOGY & ADDITIONAL TESTS:                        DVT/GI ppx  Discussed with pt @ bedside

## 2018-11-05 NOTE — PROGRESS NOTE ADULT - ASSESSMENT
·	ESRD on HD  ·	Cholecystitis, s/p Perc cholecystostomy  ·	Diabetes  ·	Hypertension    HD tomorrow. To continue HD TIW as scheduled. Fluid removal as tolerated by BP.   Dietary and PO fluid restriction. Epogen as needed for anemia. On IV abx.  Monitor BP trend. Titrate BP meds as needed. Salt restriction.   Monitor blood sugar levels. Insulin coverage as needed. Surgery follow up.

## 2018-11-05 NOTE — PROGRESS NOTE ADULT - SUBJECTIVE AND OBJECTIVE BOX
infectious diseases progress note:    IRENE TAYLOR is a 68y y. o. Female patient    Patient with no new concerning issues    Allergies    No Known Drug Allergies  Purell (Rash)    Intolerances        ANTIBIOTICS/RELEVANT:  antimicrobials  ertapenem  IVPB 500 milliGRAM(s) IV Intermittent every 24 hours    immunologic:    OTHER:  acetaminophen   Tablet .. 650 milliGRAM(s) Oral every 6 hours PRN  aspirin enteric coated 81 milliGRAM(s) Oral daily  atorvastatin 40 milliGRAM(s) Oral at bedtime  calcium carbonate 1250 mG  + Vitamin D (OsCal 500 + D) 1 Tablet(s) Oral daily  clopidogrel Tablet 75 milliGRAM(s) Oral daily  dextrose 40% Gel 15 Gram(s) Oral once PRN  dextrose 5%. 1000 milliLiter(s) IV Continuous <Continuous>  dextrose 50% Injectable 12.5 Gram(s) IV Push once  dextrose 50% Injectable 25 Gram(s) IV Push once  dextrose 50% Injectable 25 Gram(s) IV Push once  docusate sodium 100 milliGRAM(s) Oral three times a day  ferrous    sulfate 325 milliGRAM(s) Oral three times a day  gabapentin 300 milliGRAM(s) Oral daily  glucagon  Injectable 1 milliGRAM(s) IntraMuscular once PRN  heparin  Injectable 5000 Unit(s) SubCutaneous every 8 hours  HYDROmorphone  Injectable 0.25 milliGRAM(s) IV Push every 4 hours PRN  HYDROmorphone  Injectable 0.5 milliGRAM(s) IV Push every 4 hours PRN  insulin lispro (HumaLOG) corrective regimen sliding scale   SubCutaneous Before meals and at bedtime  isosorbide   mononitrate ER Tablet (IMDUR) 30 milliGRAM(s) Oral every 24 hours  isosorbide   mononitrate ER Tablet (IMDUR) 60 milliGRAM(s) Oral <User Schedule>  metoprolol tartrate 50 milliGRAM(s) Oral two times a day  NIFEdipine XL 60 milliGRAM(s) Oral daily  pantoprazole    Tablet 40 milliGRAM(s) Oral before breakfast  senna 2 Tablet(s) Oral at bedtime  simethicone 80 milliGRAM(s) Chew every 6 hours PRN  ursodiol Capsule 300 milliGRAM(s) Oral every 12 hours      Objective:  Vital Signs Last 24 Hrs  T(C): 36.8 (05 Nov 2018 04:55), Max: 37.2 (04 Nov 2018 20:55)  T(F): 98.2 (05 Nov 2018 04:55), Max: 98.9 (04 Nov 2018 20:55)  HR: 63 (05 Nov 2018 11:47) (58 - 71)  BP: 129/54 (05 Nov 2018 04:55) (122/68 - 130/69)  BP(mean): --  RR: 17 (05 Nov 2018 06:17) (17 - 18)  SpO2: 94% (05 Nov 2018 11:47) (92% - 100%)    T(C): 36.8 (11-05-18 @ 04:55), Max: 37.2 (11-03-18 @ 16:01)  T(C): 36.8 (11-05-18 @ 04:55), Max: 37.3 (11-02-18 @ 15:40)  T(C): 36.8 (11-05-18 @ 04:55), Max: 37.3 (11-02-18 @ 15:40)    PHYSICAL EXAM:  Constitutional: Well-developed, well nourished  Eyes: PERRLA, EOMI  Ear/Nose/Throat: oropharynx normal	  Neck: no JVD, no lymphadenopathy, supple  Respiratory: no accessory muscle use  Cardiovascular: RRR,   Gastrointestinal: soft, NT  Extremities: no clubbing, no cyanosis, edema absent      LABS:                        8.9    12.00 )-----------( 213      ( 05 Nov 2018 09:56 )             29.4       12.00 11-05 @ 09:56  10.32 11-04 @ 16:40  10.82 11-04 @ 10:19  10.11 11-03 @ 05:35  12.32 11-02 @ 08:01  15.58 11-01 @ 05:33  15.40 10-31 @ 07:08  24.63 10-30 @ 09:04  13.58 10-30 @ 05:49  23.96 10-29 @ 15:30      11-05    135  |  98  |  57<H>  ----------------------------<  159<H>  4.0   |  25  |  6.10<H>    Ca    8.5      05 Nov 2018 09:56  Phos  2.6     11-04  Mg     2.2     11-04    TPro  6.3  /  Alb  1.7<L>  /  TBili  1.4<H>  /  DBili  x   /  AST  31  /  ALT  40  /  AlkPhos  348<H>  11-05      Creatinine, Serum: 6.10 mg/dL (11-05-18 @ 09:56)  Creatinine, Serum: 4.30 mg/dL (11-04-18 @ 10:19)  Creatinine, Serum: 5.40 mg/dL (11-03-18 @ 05:35)  Creatinine, Serum: 4.00 mg/dL (11-02-18 @ 08:01)  Creatinine, Serum: 5.40 mg/dL (11-01-18 @ 05:33)  Creatinine, Serum: 4.10 mg/dL (10-31-18 @ 07:08)  Creatinine, Serum: 5.90 mg/dL (10-30-18 @ 07:37)  Creatinine, Serum: 6.00 mg/dL (10-30-18 @ 05:49)  Creatinine, Serum: 5.40 mg/dL (10-29-18 @ 15:30)                MICROBIOLOGY:              RADIOLOGY & ADDITIONAL STUDIES:

## 2018-11-05 NOTE — PROGRESS NOTE ADULT - PROBLEM SELECTOR PLAN 1
overall improved  s/p perc c-tube  repeat blood/bile fluid cxs ngtd  cont abx per id  serial lfts  cont mily  cont bowel regimen  monitor drain output, ?need for tube study check  f/u further surgery recs  id following  monitor abd exam  pain control  diet as tolerated  will follow

## 2018-11-05 NOTE — PROGRESS NOTE ADULT - SUBJECTIVE AND OBJECTIVE BOX
Subjective: pt without new complaints.    Objective:  Vital Signs Last 24 Hrs  T(C): 36.8 (05 Nov 2018 04:55), Max: 37.2 (04 Nov 2018 20:55)  T(F): 98.2 (05 Nov 2018 04:55), Max: 98.9 (04 Nov 2018 20:55)  HR: 64 (05 Nov 2018 06:17) (58 - 71)  BP: 129/54 (05 Nov 2018 04:55) (116/69 - 130/69)  BP(mean): --  RR: 17 (05 Nov 2018 06:17) (17 - 18)  SpO2: 98% (05 Nov 2018 06:17) (92% - 100%)    Heent: QUENTIN, FREOM  Pul: decreased at bases  Cor: RSR, without murmurs  Abdomen: normal bowel sounds, without distension or tenderness  Cholecystostomy tube with bilious drainage  Extremities: without edema, motor/sensory intact, without calf pain, Homans sign negative.                        9.2    10.32 )-----------( 174      ( 04 Nov 2018 16:40 )             29.3       11-04    133<L>  |  96  |  37<H>  ----------------------------<  182<H>  3.8   |  28  |  4.30<H>    Ca    8.4<L>      04 Nov 2018 10:19  Phos  2.6     11-04  Mg     2.2     11-04 11-04 @ 07:01  -  11-05 @ 07:00  --------------------------------------------------------  IN:  Total IN: 0 mL    OUT:    Drain: 3 mL  Total OUT: 3 mL    Total NET: -3 mL

## 2018-11-05 NOTE — PROGRESS NOTE ADULT - SUBJECTIVE AND OBJECTIVE BOX
[facility] -- Glynn Bullock, Claudia Davis, Morgan Sams Savella  Office # [phone]      Follow Up:  CAD    Subjective/Observations: Seen and examined.  Lethargic difficult to arouse today states that she's tired.  RN states she had the same difficulty but was more awake this am.  Lying flat with no signs of orthopnea.  NAD.        REVIEW OF SYSTEMS: All other review of systems is negative unless indicated above    PAST MEDICAL & SURGICAL HISTORY:  ESRD (end stage renal disease) on dialysis: MWF  CAD (coronary artery disease): s/p stent in 2007  Diabetes  Myocardial infarction  Hypertension  H/O: hysterectomy      MEDICATIONS  (STANDING):  aspirin enteric coated 81 milliGRAM(s) Oral daily  atorvastatin 40 milliGRAM(s) Oral at bedtime  calcium carbonate 1250 mG  + Vitamin D (OsCal 500 + D) 1 Tablet(s) Oral daily  clopidogrel Tablet 75 milliGRAM(s) Oral daily  dextrose 5%. 1000 milliLiter(s) (50 mL/Hr) IV Continuous <Continuous>  dextrose 50% Injectable 12.5 Gram(s) IV Push once  dextrose 50% Injectable 25 Gram(s) IV Push once  dextrose 50% Injectable 25 Gram(s) IV Push once  docusate sodium 100 milliGRAM(s) Oral three times a day  ertapenem  IVPB 500 milliGRAM(s) IV Intermittent every 24 hours  ferrous    sulfate 325 milliGRAM(s) Oral three times a day  gabapentin 300 milliGRAM(s) Oral daily  heparin  Injectable 5000 Unit(s) SubCutaneous every 8 hours  insulin lispro (HumaLOG) corrective regimen sliding scale   SubCutaneous Before meals and at bedtime  isosorbide   mononitrate ER Tablet (IMDUR) 30 milliGRAM(s) Oral every 24 hours  isosorbide   mononitrate ER Tablet (IMDUR) 60 milliGRAM(s) Oral <User Schedule>  metoprolol tartrate 50 milliGRAM(s) Oral two times a day  NIFEdipine XL 60 milliGRAM(s) Oral daily  pantoprazole    Tablet 40 milliGRAM(s) Oral before breakfast  senna 2 Tablet(s) Oral at bedtime  ursodiol Capsule 300 milliGRAM(s) Oral every 12 hours    MEDICATIONS  (PRN):  acetaminophen   Tablet .. 650 milliGRAM(s) Oral every 6 hours PRN Mild Pain (1 - 3)  dextrose 40% Gel 15 Gram(s) Oral once PRN Blood Glucose LESS THAN 70 milliGRAM(s)/deciliter  glucagon  Injectable 1 milliGRAM(s) IntraMuscular once PRN Glucose LESS THAN 70 milligrams/deciliter  HYDROmorphone  Injectable 0.25 milliGRAM(s) IV Push every 4 hours PRN Moderate Pain (4 - 6)  HYDROmorphone  Injectable 0.5 milliGRAM(s) IV Push every 4 hours PRN Severe Pain (7 - 10)  simethicone 80 milliGRAM(s) Chew every 6 hours PRN abdominal bloating      Allergies    No Known Drug Allergies  Purell (Rash)    Intolerances            Vital Signs Last 24 Hrs  T(C): 36.6 (05 Nov 2018 13:40), Max: 37.2 (04 Nov 2018 20:55)  T(F): 97.9 (05 Nov 2018 13:40), Max: 98.9 (04 Nov 2018 20:55)  HR: 61 (05 Nov 2018 13:40) (58 - 71)  BP: 95/55 (05 Nov 2018 13:40) (95/55 - 129/54)  BP(mean): --  RR: 17 (05 Nov 2018 13:40) (17 - 18)  SpO2: 94% (05 Nov 2018 13:40) (92% - 100%)    I&O's Summary    04 Nov 2018 07:01  -  05 Nov 2018 07:00  --------------------------------------------------------  IN: 0 mL / OUT: 3 mL / NET: -3 mL          PHYSICAL EXAM:  TELE: NSR 60s  Constitutional: NAD, lethargic, arouses with great effort, well-developed  HEENT: Moist Mucous Membranes, Anicteric  Pulmonary: Non-labored, breath sounds with scattered rhonchi anteriorly +productive cough  Cardiovascular: Regular, S1 and S2, No murmurs, rubs, gallops or clicks  Gastrointestinal: Bowel Sounds present, soft, nontender. T-Tube Left side  Lymph: No peripheral edema. No lymphadenopathy.  Skin: No visible rashes or ulcers.  Psych:  Lethargic unable to assess    LABS: All Labs Reviewed:                        8.9    12.00 )-----------( 213      ( 05 Nov 2018 09:56 )             29.4                         9.2    10.32 )-----------( 174      ( 04 Nov 2018 16:40 )             29.3                         9.5    10.82 )-----------( 158      ( 04 Nov 2018 10:19 )             30.8     05 Nov 2018 09:56    135    |  98     |  57     ----------------------------<  159    4.0     |  25     |  6.10   04 Nov 2018 10:19    133    |  96     |  37     ----------------------------<  182    3.8     |  28     |  4.30   03 Nov 2018 05:35    135    |  96     |  53     ----------------------------<  171    4.1     |  29     |  5.40     Ca    8.5        05 Nov 2018 09:56  Ca    8.4        04 Nov 2018 10:19  Ca    8.8        03 Nov 2018 05:35  Phos  2.6       04 Nov 2018 10:19  Phos  3.3       03 Nov 2018 05:35  Mg     2.2       04 Nov 2018 10:19  Mg     2.4       03 Nov 2018 05:35    TPro  6.3    /  Alb  1.7    /  TBili  1.4    /  DBili  x      /  AST  31     /  ALT  40     /  AlkPhos  348    05 Nov 2018 09:56  TPro  6.3    /  Alb  1.7    /  TBili  2.1    /  DBili  x      /  AST  39     /  ALT  68     /  AlkPhos  403    03 Nov 2018 05:35    < from: 12 Lead ECG (11.02.18 @ 08:59) >  Ventricular Rate 72 BPM    Atrial Rate 72 BPM    P-R Interval 156 ms    QRS Duration 156 ms    Q-T Interval 444 ms    QTC Calculation(Bezet) 486 ms    P Axis 50 degrees    R Axis 90 degrees    T Axis -14 degrees    Diagnosis Line Normal sinus rhythm  Right bundle branch block  Inferior infarct (cited on or before 31-OCT-2018)  Abnormal ECG    Confirmed by Jovanna Hunter ([id number]) on 11/3/2018 11:50:16 AM    < end of copied text >    < from: TTE Echo Doppler w/o Cont (05.04.18 @ 20:56) >   EXAM:  ECHO TTE W/O CON COMP W/DOPPLR         PROCEDURE DATE:  05/04/2018        INTERPRETATION:  Height 165cm. weight 95kg. blood pressure 129/81.   Technician TE. Indication for study dyspnea.    Aortic root 2.3 cm left atrium 4.4 cm left ventricular end-diastolic   diameter 5.7 cm left ventricular end-systolic diameter 4.3 cm septal wall   thickness 1.2 cm posterior wall thickness 1.2 cm visually equivocally   estimated ejection fraction 50-55%.    The aortic root is calcified and of normaldiameter. 3 distinct leaflets   of the aortic valve are not well demonstrated by this study. The   technician was unable to identify any significant gradient across this   valve to suggest hemodynamically significant aortic stenosis. Left atrium   isenlarged. The left ventricle was difficult to visualize by all   standard echocardiographic windows. Possibly the end-diastolic diameter   is mildly increased. The wall thickness is borderline increased. Any   significant focal wall motion abnormality cannot be ascertained by this   study. Visually estimated ejection fraction 50-55%. The mitral annulus is   calcified. The mitral valve leaflets are mildly thickened with a normal   EF slope and excursion. There is no evidence for hemodynamically   significant mitral stenosis. On the very limited color flow Doppler   portion examination mild mitral regurgitation suggested.    Conclusion:  1. Technically difficult limited supine study. Please note that this is a   portable study and the study is also limited due to patient's body   habitus.  2. 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  3. Enlarged left atrium with associated mild mitral regurgitation.  4. Very limited visualization left ventricle which may represent mild   left ventricular concentric hypertrophy with a well-preserved ejection   fraction as described above.  5. A very small posterior pericardial effusion is suggested but not   diagnostic.  6. Correlate these limited findings clinically.                    SALVADOR PHILLIPS M.D., ATTENDING CARDIOLOGIST  This document has been electronically signed. May  5 2018  9:47AM              < end of copied text >    < from: Xray Abdomen 1 View PORTABLE -Routine (11.02.18 @ 11:59) >    EXAM:  XR ABDOMEN PORTABLE ROUTINE 1V                            PROCEDURE DATE:  11/02/2018          INTERPRETATION:  EXAM: KUB.     CLINICAL INDICATION: Follow-up evaluation of gastric distention.     TECHNIQUE: 2 AP supine views.     PRIOR EXAM:11/01/2018.     FINDINGS:      Moderate gastric distention is essentially unchanged. No small bowel or   colonic distention is seen. A cholecystostomy tube is again evident. A   central venous catheter is redemonstrated terminating in the region of   right atrium. There is evidence of multilevel thoracolumbar spondylosis.      IMPRESSION:     No significant change in the appearance of gastric distention.                  RICCI VALENTINE M.D., ATTENDING RADIOLOGIST  This document has been electronically signed. Nov 2 2018 12:26PM          < end of copied text >

## 2018-11-05 NOTE — PROGRESS NOTE ADULT - SUBJECTIVE AND OBJECTIVE BOX
Patient is a 68y old  Female who presents with a chief complaint of Sepsis (31 Oct 2018 07:06)      Patient seen in follow up for ESRD on HD. s/p Perc Cholecystostomy. + blood cultures ESBL     PAST MEDICAL HISTORY:  ESRD (end stage renal disease) on dialysis  CAD (coronary artery disease)  Diabetes  CRF (chronic renal failure)  Hypertension  Pneumonia  Myocardial infarction  Hypertension  Diabetes     MEDICATIONS  (STANDING):  aspirin enteric coated 81 milliGRAM(s) Oral daily  atorvastatin 40 milliGRAM(s) Oral at bedtime  calcium carbonate 1250 mG  + Vitamin D (OsCal 500 + D) 1 Tablet(s) Oral daily  clopidogrel Tablet 75 milliGRAM(s) Oral daily  dextrose 5%. 1000 milliLiter(s) (50 mL/Hr) IV Continuous <Continuous>  dextrose 50% Injectable 12.5 Gram(s) IV Push once  dextrose 50% Injectable 25 Gram(s) IV Push once  dextrose 50% Injectable 25 Gram(s) IV Push once  docusate sodium 100 milliGRAM(s) Oral three times a day  ertapenem  IVPB 500 milliGRAM(s) IV Intermittent every 24 hours  ferrous    sulfate 325 milliGRAM(s) Oral three times a day  gabapentin 300 milliGRAM(s) Oral daily  heparin  Injectable 5000 Unit(s) SubCutaneous every 8 hours  insulin lispro (HumaLOG) corrective regimen sliding scale   SubCutaneous Before meals and at bedtime  isosorbide   mononitrate ER Tablet (IMDUR) 30 milliGRAM(s) Oral every 24 hours  isosorbide   mononitrate ER Tablet (IMDUR) 60 milliGRAM(s) Oral <User Schedule>  metoprolol tartrate 50 milliGRAM(s) Oral two times a day  NIFEdipine XL 60 milliGRAM(s) Oral daily  pantoprazole    Tablet 40 milliGRAM(s) Oral before breakfast  senna 2 Tablet(s) Oral at bedtime  ursodiol Capsule 300 milliGRAM(s) Oral every 12 hours    MEDICATIONS  (PRN):  acetaminophen   Tablet .. 650 milliGRAM(s) Oral every 6 hours PRN Mild Pain (1 - 3)  dextrose 40% Gel 15 Gram(s) Oral once PRN Blood Glucose LESS THAN 70 milliGRAM(s)/deciliter  glucagon  Injectable 1 milliGRAM(s) IntraMuscular once PRN Glucose LESS THAN 70 milligrams/deciliter  HYDROmorphone  Injectable 0.25 milliGRAM(s) IV Push every 4 hours PRN Moderate Pain (4 - 6)  HYDROmorphone  Injectable 0.5 milliGRAM(s) IV Push every 4 hours PRN Severe Pain (7 - 10)  simethicone 80 milliGRAM(s) Chew every 6 hours PRN abdominal bloating    T(C): 36.8 (11-05-18 @ 04:55), Max: 37.2 (11-03-18 @ 16:01)  HR: 63 (11-05-18 @ 11:47) (58 - 75)  BP: 129/54 (11-05-18 @ 04:55) (107/55 - 130/69)  RR: 17 (11-05-18 @ 06:17)  SpO2: 94% (11-05-18 @ 11:47)  Wt(kg): --  I&O's Detail    04 Nov 2018 07:01  -  05 Nov 2018 07:00  --------------------------------------------------------  IN:  Total IN: 0 mL    OUT:    Drain: 3 mL  Total OUT: 3 mL    Total NET: -3 mL    PHYSICAL EXAM:  General: NAD, Rt chest dialysis catheter  Respiratory: b/l air entry  Cardiovascular: S1 S2  Gastrointestinal: soft  Extremities:  no edema                LABORATORY:                        8.9    12.00 )-----------( 213      ( 05 Nov 2018 09:56 )             29.4     11-05    135  |  98  |  57<H>  ----------------------------<  159<H>  4.0   |  25  |  6.10<H>    Ca    8.5      05 Nov 2018 09:56  Phos  2.6     11-04  Mg     2.2     11-04    TPro  6.3  /  Alb  1.7<L>  /  TBili  1.4<H>  /  DBili  x   /  AST  31  /  ALT  40  /  AlkPhos  348<H>  11-05    Sodium, Serum: 135 mmol/L (11-05 @ 09:56)  Sodium, Serum: 133 mmol/L (11-04 @ 10:19)    Potassium, Serum: 4.0 mmol/L (11-05 @ 09:56)  Potassium, Serum: 3.8 mmol/L (11-04 @ 10:19)    Hemoglobin: 8.9 g/dL (11-05 @ 09:56)  Hemoglobin: 9.2 g/dL (11-04 @ 16:40)  Hemoglobin: 9.5 g/dL (11-04 @ 10:19)  Hemoglobin: 9.5 g/dL (11-03 @ 05:35)    Creatinine, Serum 6.10 (11-05 @ 09:56)  Creatinine, Serum 4.30 (11-04 @ 10:19)  Creatinine, Serum 5.40 (11-03 @ 05:35)        LIVER FUNCTIONS - ( 05 Nov 2018 09:56 )  Alb: 1.7 g/dL / Pro: 6.3 g/dL / ALK PHOS: 348 U/L / ALT: 40 U/L / AST: 31 U/L / GGT: x

## 2018-11-05 NOTE — PROGRESS NOTE ADULT - SUBJECTIVE AND OBJECTIVE BOX
INTERVAL HPI/OVERNIGHT EVENTS:  pt seen and examined  lethargic in bed  per overnight rn medicated for generalized pain x1, no vomiting, no bm  afebrile overnight no new labs to assess  ate 100% of breakfast yesterday per flow sheets    MEDICATIONS  (STANDING):  aspirin enteric coated 81 milliGRAM(s) Oral daily  atorvastatin 40 milliGRAM(s) Oral at bedtime  calcium carbonate 1250 mG  + Vitamin D (OsCal 500 + D) 1 Tablet(s) Oral daily  clopidogrel Tablet 75 milliGRAM(s) Oral daily  dextrose 5%. 1000 milliLiter(s) (50 mL/Hr) IV Continuous <Continuous>  dextrose 50% Injectable 12.5 Gram(s) IV Push once  dextrose 50% Injectable 25 Gram(s) IV Push once  dextrose 50% Injectable 25 Gram(s) IV Push once  docusate sodium 100 milliGRAM(s) Oral three times a day  ertapenem  IVPB 500 milliGRAM(s) IV Intermittent every 24 hours  ferrous    sulfate 325 milliGRAM(s) Oral three times a day  gabapentin 300 milliGRAM(s) Oral daily  heparin  Injectable 5000 Unit(s) SubCutaneous every 8 hours  insulin lispro (HumaLOG) corrective regimen sliding scale   SubCutaneous Before meals and at bedtime  isosorbide   mononitrate ER Tablet (IMDUR) 30 milliGRAM(s) Oral every 24 hours  isosorbide   mononitrate ER Tablet (IMDUR) 60 milliGRAM(s) Oral <User Schedule>  metoprolol tartrate 50 milliGRAM(s) Oral two times a day  NIFEdipine XL 60 milliGRAM(s) Oral daily  senna 2 Tablet(s) Oral at bedtime  ursodiol Capsule 300 milliGRAM(s) Oral every 12 hours    MEDICATIONS  (PRN):  acetaminophen   Tablet .. 650 milliGRAM(s) Oral every 6 hours PRN Mild Pain (1 - 3)  dextrose 40% Gel 15 Gram(s) Oral once PRN Blood Glucose LESS THAN 70 milliGRAM(s)/deciliter  glucagon  Injectable 1 milliGRAM(s) IntraMuscular once PRN Glucose LESS THAN 70 milligrams/deciliter  HYDROmorphone  Injectable 0.25 milliGRAM(s) IV Push every 4 hours PRN Moderate Pain (4 - 6)  HYDROmorphone  Injectable 0.5 milliGRAM(s) IV Push every 4 hours PRN Severe Pain (7 - 10)  simethicone 80 milliGRAM(s) Chew every 6 hours PRN abdominal bloating      Allergies    No Known Drug Allergies  Purell (Rash)    Intolerances        Review of Systems:  unable to obtain in entirety      Vital Signs Last 24 Hrs  T(C): 36.8 (05 Nov 2018 04:55), Max: 37.2 (04 Nov 2018 20:55)  T(F): 98.2 (05 Nov 2018 04:55), Max: 98.9 (04 Nov 2018 20:55)  HR: 64 (05 Nov 2018 08:03) (58 - 71)  BP: 129/54 (05 Nov 2018 04:55) (116/69 - 130/69)  BP(mean): --  RR: 17 (05 Nov 2018 06:17) (17 - 18)  SpO2: 94% (05 Nov 2018 08:03) (92% - 100%)    PHYSICAL EXAM:    Constitutional: NAD, lying in bed  HEENT: ncat  Neck: No LAD  Respiratory: dec bs  Cardiovascular: S1 and S2, RRR  Gastrointestinal: soft mild ttp mild dt perc kevin tube in place w minimal output  Extremities: no edema  Vascular: 2+ peripheral pulses  Neurological: lethargic but arousable  Skin: No rashes        LABS:                        9.2    10.32 )-----------( 174      ( 04 Nov 2018 16:40 )             29.3     11-04    133<L>  |  96  |  37<H>  ----------------------------<  182<H>  3.8   |  28  |  4.30<H>    Ca    8.4<L>      04 Nov 2018 10:19  Phos  2.6     11-04  Mg     2.2     11-04            RADIOLOGY & ADDITIONAL TESTS:

## 2018-11-05 NOTE — PROGRESS NOTE ADULT - ASSESSMENT
Patient is now day #3 status post cholecystotomy tube for biliary sepsis. She had rapid atrial fibrillation on 10/30 and converted to sinus rhythm. She required pressor therapy which has now been discontinued. Her white blood count improved today remains hemodynamically stable. She has chronic renal failure on hemodialysis. Her elevated troponin level may represent a component of subendocardial ischemia without true atherothrombosis. These results are nonspecific in the setting of her kidney disease. Currently afebrile and arousable with no evidence of decompensated heart failure or active ischemic    #1 CAD/ HTN  cont ASA and Plavix   cont Lopressor w/ hold parameters ( BP stable as per flow sheet)  cont Statin   cont Imdur   cont Procardia   cont on tele, SR w/ no events no AF reported      #2 Septic  Shock   Plan: 2/2 acute calculus cholecystitis with biliary dilatation with common bile duct stone.   Blood Cx + for ESBL. E.coli antibx switched to Ertapenem.  cont IV ABx as per ID, GI f/u  GB drain   Transaminitis resolved  Lethargic on exam today.        #3 ESRD (end stage renal disease) on dialysis.  renal f/u   fluid removal as per renal with HD last session 11/3 2L removed due 11/6  Euvolemic on exam      DVT prophylaxis w/ heparin subcut  monitor and replete lytes, keep K>4, Mg>2    Other cardiovascular workup will depend on clinical course.  All other workup per primary team  Will cont follow     Isabella Vega NP-C  Cardiology Patient is now day #3 status post cholecystotomy tube for biliary sepsis. She had rapid atrial fibrillation on 10/30 and converted to sinus rhythm. She required pressor therapy which has now been discontinued. Her white blood count improved today remains hemodynamically stable. She has chronic renal failure on hemodialysis. Her elevated troponin level may represent a component of subendocardial ischemia without true atherothrombosis. These results are nonspecific in the setting of her kidney disease. Currently afebrile and arousable with no evidence of decompensated heart failure or active ischemic    #1 CAD/ HTN  cont ASA and Plavix   cont Lopressor w/ hold parameters ( BP stable as per flow sheet)  cont Statin   cont Imdur   cont Procardia   cont on tele, SR w/ no events no AF reported      #2 Septic  Shock   Plan: 2/2 acute calculus cholecystitis with biliary dilatation with common bile duct stone.   Blood Cx + for ESBL. E.coli antibx switched to Ertapenem.  cont IV ABx as per ID, GI f/u  GB drain   Transaminitis resolved  Lethargic on exam today.        #3 ESRD (end stage renal disease) on dialysis.  renal f/u   fluid removal as per renal with HD last session 11/3 2L removed due 11/6  Euvolemic on exam, though it is somewhat limited      DVT prophylaxis w/ heparin subcut  monitor and replete lytes, keep K>4, Mg>2    Other cardiovascular workup will depend on clinical course.  All other workup per primary team  Will cont follow     Isabella Vega NP-C  Cardiology

## 2018-11-05 NOTE — PROGRESS NOTE ADULT - PROBLEM SELECTOR PLAN 2
renally dosed ertapenem.    Thank you for consulting us and involving us in the management of this most interesting and challenging case.     Please Call with any further questions

## 2018-11-05 NOTE — PROGRESS NOTE ADULT - PROBLEM SELECTOR PLAN 1
Anticipate 7 days of effective therapy past date of documented negative blood cultures and local site control and no good oral options so recommend   ertapenem 500mg IV Q-day with last dose 11/7

## 2018-11-05 NOTE — PROVIDER CONTACT NOTE (CRITICAL VALUE NOTIFICATION) - TEST AND RESULT REPORTED:
+ gram negative Rods in blood
10/30 bLD CUL pRELIM aerobic Ecoli ESBL gross anaerobic gram-ve rods
30% bands
31% Bands
Bld cul x2 AEROBIC AND [id number]  gross E-coli ESBL
Blood Culture drawn 10/30 @ 8:30 am & 8:50 am positive E Coli, ESBL; anaerobic & aerobic bottles;   Body Fluid Culture on 10/30 @ 15:53 bile fluid positive for numerous E Coli & moderate Alpha Hemolytic Strep susceptibilities not performed yet.
Blood culture collected on 10/29/2018, Preliminary report : Growth in aerobic and anearobic bottle gram negative rods
Body fluid culture collected on 10/30/18 at 15:53 showed Non polymorpho nuclear leukocytes seen per low power field , Numerous Gram negative rods seen per oil power field
Body fluid culture final report- Numerous Escherichia coli ESBL. Moderate Alpha hemolytic strep
[place] [id number]
Lactate 2.3
Lactate:2.5
Positive BC growth in aerobic bottle gram negative rods. From 10/30, repeated
blood culture from 11/1 final report growth anerobic ecoli/ESBL
la 2.2
lactate 2.7
BC from 10/30 growth in aerobic bottle: gram negative rods

## 2018-11-06 DIAGNOSIS — D64.9 ANEMIA, UNSPECIFIED: ICD-10-CM

## 2018-11-06 LAB
ALBUMIN SERPL ELPH-MCNC: 1.7 G/DL — LOW (ref 3.3–5)
ALP SERPL-CCNC: 325 U/L — HIGH (ref 40–120)
ALT FLD-CCNC: 31 U/L — SIGNIFICANT CHANGE UP (ref 12–78)
ANION GAP SERPL CALC-SCNC: 12 MMOL/L — SIGNIFICANT CHANGE UP (ref 5–17)
ANION GAP SERPL CALC-SCNC: 12 MMOL/L — SIGNIFICANT CHANGE UP (ref 5–17)
AST SERPL-CCNC: 22 U/L — SIGNIFICANT CHANGE UP (ref 15–37)
BILIRUB SERPL-MCNC: 1.1 MG/DL — SIGNIFICANT CHANGE UP (ref 0.2–1.2)
BUN SERPL-MCNC: 72 MG/DL — HIGH (ref 7–23)
BUN SERPL-MCNC: 75 MG/DL — HIGH (ref 7–23)
CALCIUM SERPL-MCNC: 8.2 MG/DL — LOW (ref 8.5–10.1)
CALCIUM SERPL-MCNC: 8.4 MG/DL — LOW (ref 8.5–10.1)
CHLORIDE SERPL-SCNC: 96 MMOL/L — SIGNIFICANT CHANGE UP (ref 96–108)
CHLORIDE SERPL-SCNC: 96 MMOL/L — SIGNIFICANT CHANGE UP (ref 96–108)
CO2 SERPL-SCNC: 24 MMOL/L — SIGNIFICANT CHANGE UP (ref 22–31)
CO2 SERPL-SCNC: 24 MMOL/L — SIGNIFICANT CHANGE UP (ref 22–31)
CREAT SERPL-MCNC: 7.6 MG/DL — HIGH (ref 0.5–1.3)
CREAT SERPL-MCNC: 7.9 MG/DL — HIGH (ref 0.5–1.3)
CULTURE RESULTS: SIGNIFICANT CHANGE UP
CULTURE RESULTS: SIGNIFICANT CHANGE UP
GLUCOSE SERPL-MCNC: 176 MG/DL — HIGH (ref 70–99)
GLUCOSE SERPL-MCNC: 227 MG/DL — HIGH (ref 70–99)
HCT VFR BLD CALC: 27.3 % — LOW (ref 34.5–45)
HCT VFR BLD CALC: 28.4 % — LOW (ref 34.5–45)
HGB BLD-MCNC: 8.4 G/DL — LOW (ref 11.5–15.5)
HGB BLD-MCNC: 8.7 G/DL — LOW (ref 11.5–15.5)
MCHC RBC-ENTMCNC: 26.6 PG — LOW (ref 27–34)
MCHC RBC-ENTMCNC: 26.6 PG — LOW (ref 27–34)
MCHC RBC-ENTMCNC: 30.6 GM/DL — LOW (ref 32–36)
MCHC RBC-ENTMCNC: 30.8 GM/DL — LOW (ref 32–36)
MCV RBC AUTO: 86.4 FL — SIGNIFICANT CHANGE UP (ref 80–100)
MCV RBC AUTO: 86.9 FL — SIGNIFICANT CHANGE UP (ref 80–100)
NRBC # BLD: 0 /100 WBCS — SIGNIFICANT CHANGE UP (ref 0–0)
NRBC # BLD: 0 /100 WBCS — SIGNIFICANT CHANGE UP (ref 0–0)
ORGANISM # SPEC MICROSCOPIC CNT: SIGNIFICANT CHANGE UP
ORGANISM # SPEC MICROSCOPIC CNT: SIGNIFICANT CHANGE UP
PLATELET # BLD AUTO: 257 K/UL — SIGNIFICANT CHANGE UP (ref 150–400)
PLATELET # BLD AUTO: 269 K/UL — SIGNIFICANT CHANGE UP (ref 150–400)
POTASSIUM SERPL-MCNC: 4.2 MMOL/L — SIGNIFICANT CHANGE UP (ref 3.5–5.3)
POTASSIUM SERPL-MCNC: 4.3 MMOL/L — SIGNIFICANT CHANGE UP (ref 3.5–5.3)
POTASSIUM SERPL-SCNC: 4.2 MMOL/L — SIGNIFICANT CHANGE UP (ref 3.5–5.3)
POTASSIUM SERPL-SCNC: 4.3 MMOL/L — SIGNIFICANT CHANGE UP (ref 3.5–5.3)
PROT SERPL-MCNC: 6.4 G/DL — SIGNIFICANT CHANGE UP (ref 6–8.3)
RBC # BLD: 3.16 M/UL — LOW (ref 3.8–5.2)
RBC # BLD: 3.27 M/UL — LOW (ref 3.8–5.2)
RBC # FLD: 16.4 % — HIGH (ref 10.3–14.5)
RBC # FLD: 16.6 % — HIGH (ref 10.3–14.5)
SODIUM SERPL-SCNC: 132 MMOL/L — LOW (ref 135–145)
SODIUM SERPL-SCNC: 132 MMOL/L — LOW (ref 135–145)
SPECIMEN SOURCE: SIGNIFICANT CHANGE UP
SPECIMEN SOURCE: SIGNIFICANT CHANGE UP
WBC # BLD: 12.65 K/UL — HIGH (ref 3.8–10.5)
WBC # BLD: 12.82 K/UL — HIGH (ref 3.8–10.5)
WBC # FLD AUTO: 12.65 K/UL — HIGH (ref 3.8–10.5)
WBC # FLD AUTO: 12.82 K/UL — HIGH (ref 3.8–10.5)

## 2018-11-06 PROCEDURE — 99233 SBSQ HOSP IP/OBS HIGH 50: CPT

## 2018-11-06 PROCEDURE — 99232 SBSQ HOSP IP/OBS MODERATE 35: CPT

## 2018-11-06 RX ORDER — POLYETHYLENE GLYCOL 3350 17 G/17G
17 POWDER, FOR SOLUTION ORAL DAILY
Qty: 0 | Refills: 0 | Status: DISCONTINUED | OUTPATIENT
Start: 2018-11-06 | End: 2018-11-16

## 2018-11-06 RX ADMIN — Medication 2: at 08:22

## 2018-11-06 RX ADMIN — Medication 325 MILLIGRAM(S): at 05:29

## 2018-11-06 RX ADMIN — HEPARIN SODIUM 5000 UNIT(S): 5000 INJECTION INTRAVENOUS; SUBCUTANEOUS at 21:27

## 2018-11-06 RX ADMIN — Medication 1 TABLET(S): at 15:29

## 2018-11-06 RX ADMIN — Medication 50 MILLIGRAM(S): at 05:28

## 2018-11-06 RX ADMIN — CLOPIDOGREL BISULFATE 75 MILLIGRAM(S): 75 TABLET, FILM COATED ORAL at 15:29

## 2018-11-06 RX ADMIN — URSODIOL 300 MILLIGRAM(S): 250 TABLET, FILM COATED ORAL at 17:12

## 2018-11-06 RX ADMIN — PANTOPRAZOLE SODIUM 40 MILLIGRAM(S): 20 TABLET, DELAYED RELEASE ORAL at 05:30

## 2018-11-06 RX ADMIN — Medication 100 MILLIGRAM(S): at 05:29

## 2018-11-06 RX ADMIN — HYDROMORPHONE HYDROCHLORIDE 0.5 MILLIGRAM(S): 2 INJECTION INTRAMUSCULAR; INTRAVENOUS; SUBCUTANEOUS at 10:59

## 2018-11-06 RX ADMIN — CHLORHEXIDINE GLUCONATE 1 APPLICATION(S): 213 SOLUTION TOPICAL at 11:54

## 2018-11-06 RX ADMIN — Medication 100 MILLIGRAM(S): at 15:29

## 2018-11-06 RX ADMIN — ISOSORBIDE MONONITRATE 60 MILLIGRAM(S): 60 TABLET, EXTENDED RELEASE ORAL at 05:28

## 2018-11-06 RX ADMIN — Medication 60 MILLIGRAM(S): at 05:28

## 2018-11-06 RX ADMIN — URSODIOL 300 MILLIGRAM(S): 250 TABLET, FILM COATED ORAL at 05:29

## 2018-11-06 RX ADMIN — Medication 50 MILLIGRAM(S): at 17:12

## 2018-11-06 RX ADMIN — HEPARIN SODIUM 5000 UNIT(S): 5000 INJECTION INTRAVENOUS; SUBCUTANEOUS at 05:29

## 2018-11-06 RX ADMIN — Medication 325 MILLIGRAM(S): at 15:29

## 2018-11-06 RX ADMIN — ISOSORBIDE MONONITRATE 30 MILLIGRAM(S): 60 TABLET, EXTENDED RELEASE ORAL at 17:12

## 2018-11-06 RX ADMIN — ERTAPENEM SODIUM 100 MILLIGRAM(S): 1 INJECTION, POWDER, LYOPHILIZED, FOR SOLUTION INTRAMUSCULAR; INTRAVENOUS at 17:12

## 2018-11-06 RX ADMIN — Medication 2: at 17:16

## 2018-11-06 RX ADMIN — HYDROMORPHONE HYDROCHLORIDE 0.5 MILLIGRAM(S): 2 INJECTION INTRAMUSCULAR; INTRAVENOUS; SUBCUTANEOUS at 10:34

## 2018-11-06 RX ADMIN — ATORVASTATIN CALCIUM 40 MILLIGRAM(S): 80 TABLET, FILM COATED ORAL at 21:27

## 2018-11-06 RX ADMIN — Medication 6: at 22:32

## 2018-11-06 RX ADMIN — Medication 325 MILLIGRAM(S): at 21:27

## 2018-11-06 RX ADMIN — GABAPENTIN 300 MILLIGRAM(S): 400 CAPSULE ORAL at 15:29

## 2018-11-06 RX ADMIN — Medication 81 MILLIGRAM(S): at 15:29

## 2018-11-06 NOTE — PROGRESS NOTE ADULT - PROBLEM SELECTOR PLAN 1
2/2 acute calculus cholecystitis with biliary dilatation with common bile duct stone.   Blood Cx + for ESBL. E.coli antibx switched to Ertapenem.  to complete iv abx in am 11/7 and can proceed d/c home

## 2018-11-06 NOTE — PROGRESS NOTE ADULT - PROBLEM SELECTOR PLAN 2
Ac cholecystitis with gall stones, cholangitis and choledocholithiasis  S/P P/C cholecystoscopy and drainage   S/P MRCP no stone in CBD, FU GI,   improving in AST/ALT, c/w ursodiol.  fluid cx + for  E.coli switched to Ertapenem. ID  f/u  would need IR tube study follow up in 2 weeks  to complete invanz abx in am, and d/c home with homecare

## 2018-11-06 NOTE — PROGRESS NOTE ADULT - SUBJECTIVE AND OBJECTIVE BOX
Patient is a 68y old  Female who presents with a chief complaint of Sepsis (06 Nov 2018 10:44)        HPI:  Hx obtained from daughter  Patient is a 68y old female pmhx CAD s/p stent (2007), ESRD on HD (MWF; missed today's session), DM, HTN, HLD brought in with complaints of constant "hard pressure like" abdominal pain, nausea, vomiting, fever  for 2 days duration. Watery nonbloody nonfoul smelling diarrhea x 7 yesterday and 1 episode this am and NBNB vomiting x 7-8 yesterday and once today. For the past couple weeks pt has been having pain after meals, so has had a decreased PO intake. Did not trying anything for the pain and has never experienced this in the past. Endorses generalized weakness, fevers, chills. Denies recent abx usage, sick contacts, bloody stool, CP, SOB, dysuria. Pt has dialysis catheter changed 2 days ago back after it was accidently pulled out. Last dialysis session was Friday.     Upon arrival to ED patient febrile at 102.9, tachycardiac at 104 and normotensive and labs significant for WBC 23.96 30% bands elevated transaminitis, elevated tbili and elevated lactate x2, Cr 5.4.  procal elevated. UA neg Since presentation BP steadily dropping. BP dropping originally  now with SBP 98. EKG: sinus tach  CT chest abd pelvis: acute cholecystitis  RVP:neg  CXR: increased congestions  Patient given vancomycin and zosyn, tylenol and 2L IVF. (29 Oct 2018 18:49)      SUBJECTIVE & OBJECTIVE: Pt seen and examined at bedside.     PHYSICAL EXAM:  T(C): 36.6 (11-06-18 @ 11:07), Max: 36.9 (11-05-18 @ 20:10)  HR: 63 (11-06-18 @ 11:07) (61 - 69)  BP: 100/50 (11-06-18 @ 11:07) (100/50 - 164/68)  RR: 18 (11-06-18 @ 11:07) (17 - 18)  SpO2: 96% (11-06-18 @ 11:07) (93% - 96%)  Wt(kg): --   GENERAL: NAD, well-groomed, well-developed  HEAD:  Atraumatic, Normocephalic  EYES: EOMI, PERRLA, conjunctiva and sclera clear  ENMT: Moist mucous membranes  NECK: Supple, No JVD  NERVOUS SYSTEM:  Alert & Oriented X3, Motor Strength 5/5 B/L upper and lower extremities; DTRs 2+ intact and symmetric  CHEST/LUNG: Clear to auscultation bilaterally; No rales, rhonchi, wheezing, or rubs  HEART: Regular rate and rhythm; No murmurs, rubs, or gallops  ABDOMEN: Soft, Nontender, Nondistended; Bowel sounds present  EXTREMITIES:  2+ Peripheral Pulses, No clubbing, cyanosis, or edema        MEDICATIONS  (STANDING):  aspirin enteric coated 81 milliGRAM(s) Oral daily  atorvastatin 40 milliGRAM(s) Oral at bedtime  calcium carbonate 1250 mG  + Vitamin D (OsCal 500 + D) 1 Tablet(s) Oral daily  chlorhexidine 2% Cloths 1 Application(s) Topical daily  clopidogrel Tablet 75 milliGRAM(s) Oral daily  dextrose 5%. 1000 milliLiter(s) (50 mL/Hr) IV Continuous <Continuous>  dextrose 50% Injectable 12.5 Gram(s) IV Push once  dextrose 50% Injectable 25 Gram(s) IV Push once  dextrose 50% Injectable 25 Gram(s) IV Push once  docusate sodium 100 milliGRAM(s) Oral three times a day  ertapenem  IVPB 500 milliGRAM(s) IV Intermittent every 24 hours  ferrous    sulfate 325 milliGRAM(s) Oral three times a day  gabapentin 300 milliGRAM(s) Oral daily  heparin  Injectable 5000 Unit(s) SubCutaneous every 8 hours  insulin lispro (HumaLOG) corrective regimen sliding scale   SubCutaneous Before meals and at bedtime  isosorbide   mononitrate ER Tablet (IMDUR) 30 milliGRAM(s) Oral every 24 hours  isosorbide   mononitrate ER Tablet (IMDUR) 60 milliGRAM(s) Oral <User Schedule>  metoprolol tartrate 50 milliGRAM(s) Oral two times a day  NIFEdipine XL 60 milliGRAM(s) Oral daily  pantoprazole    Tablet 40 milliGRAM(s) Oral before breakfast  polyethylene glycol 3350 17 Gram(s) Oral daily  senna 2 Tablet(s) Oral at bedtime  ursodiol Capsule 300 milliGRAM(s) Oral every 12 hours    MEDICATIONS  (PRN):  acetaminophen   Tablet .. 650 milliGRAM(s) Oral every 6 hours PRN Mild Pain (1 - 3)  dextrose 40% Gel 15 Gram(s) Oral once PRN Blood Glucose LESS THAN 70 milliGRAM(s)/deciliter  glucagon  Injectable 1 milliGRAM(s) IntraMuscular once PRN Glucose LESS THAN 70 milligrams/deciliter  HYDROmorphone  Injectable 0.25 milliGRAM(s) IV Push every 4 hours PRN Moderate Pain (4 - 6)  HYDROmorphone  Injectable 0.5 milliGRAM(s) IV Push every 4 hours PRN Severe Pain (7 - 10)  simethicone 80 milliGRAM(s) Chew every 6 hours PRN abdominal bloating      LABS:                        8.4    12.65 )-----------( 269      ( 06 Nov 2018 09:53 )             27.3     11-06    132<L>  |  96  |  75<H>  ----------------------------<  227<H>  4.2   |  24  |  7.90<H>    Ca    8.2<L>      06 Nov 2018 09:53    TPro  6.4  /  Alb  1.7<L>  /  TBili  1.1  /  DBili  x   /  AST  22  /  ALT  31  /  AlkPhos  325<H>  11-06          CAPILLARY BLOOD GLUCOSE      POCT Blood Glucose.: 138 mg/dL (06 Nov 2018 12:45)  POCT Blood Glucose.: 197 mg/dL (06 Nov 2018 08:03)  POCT Blood Glucose.: 248 mg/dL (05 Nov 2018 21:02)  POCT Blood Glucose.: 232 mg/dL (05 Nov 2018 16:57)      CAPILLARY BLOOD GLUCOSE      POCT Blood Glucose.: 138 mg/dL (06 Nov 2018 12:45)  POCT Blood Glucose.: 197 mg/dL (06 Nov 2018 08:03)  POCT Blood Glucose.: 248 mg/dL (05 Nov 2018 21:02)  POCT Blood Glucose.: 232 mg/dL (05 Nov 2018 16:57)    CAPILLARY BLOOD GLUCOSE      POCT Blood Glucose.: 138 mg/dL (06 Nov 2018 12:45)            RECENT CULTURES:      RADIOLOGY & ADDITIONAL TESTS:                        DVT/GI ppx  Discussed with pt @ bedside

## 2018-11-06 NOTE — PROGRESS NOTE ADULT - PROBLEM SELECTOR PLAN 3
likely multifactorial, sepsis, acute illness, renal insufficiency  hgb trend noted  no overt s/s gib  trend h/h, transfuse prn  consider iron studies  ppi qd  will follow likely multifactorial, sepsis, acute illness, renal insufficiency  hgb trend noted  no overt s/s gib  trend h/h, transfuse prn  consider iron studies  ppi qd  if hgb continues to downtrend check fobt  will follow

## 2018-11-06 NOTE — PROGRESS NOTE ADULT - ASSESSMENT
Patient is now day #3 status post cholecystotomy tube for biliary sepsis. She had rapid atrial fibrillation on 10/30 and converted to sinus rhythm. She required pressor therapy which has now been discontinued. Her white blood count improved today remains hemodynamically stable. She has chronic renal failure on hemodialysis. Her elevated troponin level may represent a component of subendocardial ischemia without true atherothrombosis. These results are nonspecific in the setting of her kidney disease. Currently afebrile and arousable with no evidence of decompensated heart failure or active ischemic    CAD/ HTN  cont ASA and Plavix   cont Lopressor w/ hold parameters ( BP stable as per flow sheet)  cont Statin   cont Imdur   cont Procardia     renal f/u   fluid removal as per renal with HD last session 11/3 2L removed due 11/6  Euvolemic on exam, though it is somewhat limited      monitor and replete lytes, keep K>4, Mg>2    Other cardiovascular workup will depend on clinical course.  All other workup per primary team  Will cont follow

## 2018-11-06 NOTE — PROGRESS NOTE ADULT - SUBJECTIVE AND OBJECTIVE BOX
IRENE TAYLOR  68y  Female    Patient is a 68y old  Female who presents with a chief complaint of Sepsis (06 Nov 2018 15:08)      seen on dialysis. denies any chest pain or shortness of breath.       PAST MEDICAL & SURGICAL HISTORY:  ESRD (end stage renal disease) on dialysis: MWF  CAD (coronary artery disease): s/p stent in 2007  Diabetes  Myocardial infarction  Hypertension  H/O: hysterectomy    PHYSICAL EXAM:    T(C): 36.6 (11-06-18 @ 11:07), Max: 36.9 (11-05-18 @ 20:10)  HR: 63 (11-06-18 @ 11:07) (61 - 69)  BP: 100/50 (11-06-18 @ 11:07) (100/50 - 164/68)  RR: 18 (11-06-18 @ 11:07) (17 - 18)  SpO2: 96% (11-06-18 @ 11:07) (93% - 96%)  Wt(kg): --    I&O's Detail    05 Nov 2018 07:01  -  06 Nov 2018 07:00  --------------------------------------------------------  IN:    Solution: 50 mL  Total IN: 50 mL    OUT:    Drain: 4 mL  Total OUT: 4 mL    Total NET: 46 mL    Respiratory: clear anteriorly, decreased BS at bases  Cardiovascular: S1 S2  Gastrointestinal: soft NT ND +BS  Extremities: trace edema   Neuro: Awake and alert    MEDICATIONS  (STANDING):  aspirin enteric coated 81 milliGRAM(s) Oral daily  atorvastatin 40 milliGRAM(s) Oral at bedtime  calcium carbonate 1250 mG  + Vitamin D (OsCal 500 + D) 1 Tablet(s) Oral daily  chlorhexidine 2% Cloths 1 Application(s) Topical daily  clopidogrel Tablet 75 milliGRAM(s) Oral daily  dextrose 5%. 1000 milliLiter(s) (50 mL/Hr) IV Continuous <Continuous>  dextrose 50% Injectable 12.5 Gram(s) IV Push once  dextrose 50% Injectable 25 Gram(s) IV Push once  dextrose 50% Injectable 25 Gram(s) IV Push once  docusate sodium 100 milliGRAM(s) Oral three times a day  ertapenem  IVPB 500 milliGRAM(s) IV Intermittent every 24 hours  ferrous    sulfate 325 milliGRAM(s) Oral three times a day  gabapentin 300 milliGRAM(s) Oral daily  heparin  Injectable 5000 Unit(s) SubCutaneous every 8 hours  insulin lispro (HumaLOG) corrective regimen sliding scale   SubCutaneous Before meals and at bedtime  isosorbide   mononitrate ER Tablet (IMDUR) 30 milliGRAM(s) Oral every 24 hours  isosorbide   mononitrate ER Tablet (IMDUR) 60 milliGRAM(s) Oral <User Schedule>  metoprolol tartrate 50 milliGRAM(s) Oral two times a day  NIFEdipine XL 60 milliGRAM(s) Oral daily  pantoprazole    Tablet 40 milliGRAM(s) Oral before breakfast  polyethylene glycol 3350 17 Gram(s) Oral daily  senna 2 Tablet(s) Oral at bedtime  ursodiol Capsule 300 milliGRAM(s) Oral every 12 hours    MEDICATIONS  (PRN):  acetaminophen   Tablet .. 650 milliGRAM(s) Oral every 6 hours PRN Mild Pain (1 - 3)  dextrose 40% Gel 15 Gram(s) Oral once PRN Blood Glucose LESS THAN 70 milliGRAM(s)/deciliter  glucagon  Injectable 1 milliGRAM(s) IntraMuscular once PRN Glucose LESS THAN 70 milligrams/deciliter  HYDROmorphone  Injectable 0.25 milliGRAM(s) IV Push every 4 hours PRN Moderate Pain (4 - 6)  HYDROmorphone  Injectable 0.5 milliGRAM(s) IV Push every 4 hours PRN Severe Pain (7 - 10)  simethicone 80 milliGRAM(s) Chew every 6 hours PRN abdominal bloating                            8.4    12.65 )-----------( 269      ( 06 Nov 2018 09:53 )             27.3       11-06    132<L>  |  96  |  75<H>  ----------------------------<  227<H>  4.2   |  24  |  7.90<H>    Ca    8.2<L>      06 Nov 2018 09:53    TPro  6.4  /  Alb  1.7<L>  /  TBili  1.1  /  DBili  x   /  AST  22  /  ALT  31  /  AlkPhos  325<H>  11-06

## 2018-11-06 NOTE — PROGRESS NOTE ADULT - SUBJECTIVE AND OBJECTIVE BOX
[facility] - Glynn Bullock, Susan, Claudia, Flaquita, Terry Mora  Office Number:  [phone]    Patient resting comfortably in bed in NAD.  Laying flat with no respiratory distress.  No overnight events.    F/U for:  CAD      MEDICATIONS  (STANDING):  aspirin enteric coated 81 milliGRAM(s) Oral daily  atorvastatin 40 milliGRAM(s) Oral at bedtime  calcium carbonate 1250 mG  + Vitamin D (OsCal 500 + D) 1 Tablet(s) Oral daily  chlorhexidine 2% Cloths 1 Application(s) Topical daily  clopidogrel Tablet 75 milliGRAM(s) Oral daily  dextrose 5%. 1000 milliLiter(s) (50 mL/Hr) IV Continuous <Continuous>  dextrose 50% Injectable 12.5 Gram(s) IV Push once  dextrose 50% Injectable 25 Gram(s) IV Push once  dextrose 50% Injectable 25 Gram(s) IV Push once  docusate sodium 100 milliGRAM(s) Oral three times a day  ertapenem  IVPB 500 milliGRAM(s) IV Intermittent every 24 hours  ferrous    sulfate 325 milliGRAM(s) Oral three times a day  gabapentin 300 milliGRAM(s) Oral daily  heparin  Injectable 5000 Unit(s) SubCutaneous every 8 hours  insulin lispro (HumaLOG) corrective regimen sliding scale   SubCutaneous Before meals and at bedtime  isosorbide   mononitrate ER Tablet (IMDUR) 30 milliGRAM(s) Oral every 24 hours  isosorbide   mononitrate ER Tablet (IMDUR) 60 milliGRAM(s) Oral <User Schedule>  metoprolol tartrate 50 milliGRAM(s) Oral two times a day  NIFEdipine XL 60 milliGRAM(s) Oral daily  pantoprazole    Tablet 40 milliGRAM(s) Oral before breakfast  polyethylene glycol 3350 17 Gram(s) Oral daily  senna 2 Tablet(s) Oral at bedtime  ursodiol Capsule 300 milliGRAM(s) Oral every 12 hours    MEDICATIONS  (PRN):  acetaminophen   Tablet .. 650 milliGRAM(s) Oral every 6 hours PRN Mild Pain (1 - 3)  dextrose 40% Gel 15 Gram(s) Oral once PRN Blood Glucose LESS THAN 70 milliGRAM(s)/deciliter  glucagon  Injectable 1 milliGRAM(s) IntraMuscular once PRN Glucose LESS THAN 70 milligrams/deciliter  HYDROmorphone  Injectable 0.25 milliGRAM(s) IV Push every 4 hours PRN Moderate Pain (4 - 6)  HYDROmorphone  Injectable 0.5 milliGRAM(s) IV Push every 4 hours PRN Severe Pain (7 - 10)  simethicone 80 milliGRAM(s) Chew every 6 hours PRN abdominal bloating      Allergies    No Known Drug Allergies  Purell (Rash)    Intolerances        Vital Signs Last 24 Hrs  T(C): 36.7 (06 Nov 2018 04:29), Max: 36.9 (05 Nov 2018 20:10)  T(F): 98 (06 Nov 2018 04:29), Max: 98.5 (05 Nov 2018 20:10)  HR: 69 (06 Nov 2018 04:29) (61 - 69)  BP: 136/68 (06 Nov 2018 04:29) (95/55 - 164/68)  BP(mean): --  RR: 17 (06 Nov 2018 04:29) (17 - 18)  SpO2: 96% (06 Nov 2018 04:29) (93% - 96%)    I&O's Summary    05 Nov 2018 07:01  -  06 Nov 2018 07:00  --------------------------------------------------------  IN: 50 mL / OUT: 4 mL / NET: 46 mL        ON EXAM:    General: NAD, awake and alert, oriented x 3  HEENT: Mucous membranes are moist, anicteric  Lungs: Non-labored, breath sounds are clear bilaterally, No wheezing, rales or rhonchi  Cardiovascular: Regular, S1 and S2, no murmurs, rubs, or gallops  Gastrointestinal: Bowel Sounds present, soft, nontender.   Lymph: No peripheral edema. No lymphadenopathy.  Skin: No rashes or ulcers  Psych:  Mood & affect appropriate    LABS: All Labs Reviewed:                        8.4    12.65 )-----------( 269      ( 06 Nov 2018 09:53 )             27.3                         8.7    12.82 )-----------( 257      ( 06 Nov 2018 09:25 )             28.4                         8.9    12.00 )-----------( 213      ( 05 Nov 2018 09:56 )             29.4     06 Nov 2018 09:25    132    |  96     |  72     ----------------------------<  176    4.3     |  24     |  7.60   05 Nov 2018 09:56    135    |  98     |  57     ----------------------------<  159    4.0     |  25     |  6.10   04 Nov 2018 10:19    133    |  96     |  37     ----------------------------<  182    3.8     |  28     |  4.30     Ca    8.4        06 Nov 2018 09:25  Ca    8.5        05 Nov 2018 09:56  Ca    8.4        04 Nov 2018 10:19  Phos  2.6       04 Nov 2018 10:19  Mg     2.2       04 Nov 2018 10:19    TPro  6.4    /  Alb  1.7    /  TBili  1.1    /  DBili  x      /  AST  22     /  ALT  31     /  AlkPhos  325    06 Nov 2018 09:25  TPro  6.3    /  Alb  1.7    /  TBili  1.4    /  DBili  x      /  AST  31     /  ALT  40     /  AlkPhos  348    05 Nov 2018 09:56          Blood Culture: Organism --  Gram Stain Blood -- Gram Stain   No polymorphonuclear leukocytes seen  No organisms seen  by cytocentrifuge  Specimen Source .Body Fluid Bile Fluid  Culture-Blood --    Organism --  Gram Stain Blood -- Gram Stain --  Specimen Source .Blood Blood-Venous  Culture-Blood --    Organism Escherichia coli ESBL  Gram Stain Blood -- Gram Stain   No polymorphonuclear cells seen  No organisms seen  by cytocentrifuge  Specimen Source .Body Fluid Bile Fluid  Culture-Blood --

## 2018-11-06 NOTE — PROGRESS NOTE ADULT - SUBJECTIVE AND OBJECTIVE BOX
INTERVAL HPI/OVERNIGHT EVENTS:  pt seen and examined  denies n/v/abd pain  per overnight rn no acute gi issues overnight, drain with minimal output  afebrile overnight no new labs to assess    MEDICATIONS  (STANDING):  aspirin enteric coated 81 milliGRAM(s) Oral daily  atorvastatin 40 milliGRAM(s) Oral at bedtime  calcium carbonate 1250 mG  + Vitamin D (OsCal 500 + D) 1 Tablet(s) Oral daily  chlorhexidine 2% Cloths 1 Application(s) Topical daily  clopidogrel Tablet 75 milliGRAM(s) Oral daily  dextrose 5%. 1000 milliLiter(s) (50 mL/Hr) IV Continuous <Continuous>  dextrose 50% Injectable 12.5 Gram(s) IV Push once  dextrose 50% Injectable 25 Gram(s) IV Push once  dextrose 50% Injectable 25 Gram(s) IV Push once  docusate sodium 100 milliGRAM(s) Oral three times a day  ertapenem  IVPB 500 milliGRAM(s) IV Intermittent every 24 hours  ferrous    sulfate 325 milliGRAM(s) Oral three times a day  gabapentin 300 milliGRAM(s) Oral daily  heparin  Injectable 5000 Unit(s) SubCutaneous every 8 hours  insulin lispro (HumaLOG) corrective regimen sliding scale   SubCutaneous Before meals and at bedtime  isosorbide   mononitrate ER Tablet (IMDUR) 30 milliGRAM(s) Oral every 24 hours  isosorbide   mononitrate ER Tablet (IMDUR) 60 milliGRAM(s) Oral <User Schedule>  metoprolol tartrate 50 milliGRAM(s) Oral two times a day  NIFEdipine XL 60 milliGRAM(s) Oral daily  pantoprazole    Tablet 40 milliGRAM(s) Oral before breakfast  senna 2 Tablet(s) Oral at bedtime  ursodiol Capsule 300 milliGRAM(s) Oral every 12 hours    MEDICATIONS  (PRN):  acetaminophen   Tablet .. 650 milliGRAM(s) Oral every 6 hours PRN Mild Pain (1 - 3)  dextrose 40% Gel 15 Gram(s) Oral once PRN Blood Glucose LESS THAN 70 milliGRAM(s)/deciliter  glucagon  Injectable 1 milliGRAM(s) IntraMuscular once PRN Glucose LESS THAN 70 milligrams/deciliter  HYDROmorphone  Injectable 0.25 milliGRAM(s) IV Push every 4 hours PRN Moderate Pain (4 - 6)  HYDROmorphone  Injectable 0.5 milliGRAM(s) IV Push every 4 hours PRN Severe Pain (7 - 10)  simethicone 80 milliGRAM(s) Chew every 6 hours PRN abdominal bloating      Allergies    No Known Drug Allergies  Purell (Rash)    Intolerances        Review of Systems:    General:  No wt loss, fevers, chills, night sweats, fatigue   Eyes:  Good vision, no reported pain  ENT:  No sore throat, pain, runny nose, dysphagia  CV:  No pain, palpitations, hypo/hypertension  Resp:  No dyspnea, cough, tachypnea, wheezing  GI:  No pain, No nausea, No vomiting, No diarrhea, No constipation, No weight loss, No fever, No pruritis, No rectal bleeding, No melena, No dysphagia  :  No pain, bleeding, incontinence, nocturia  Muscle:  No pain, weakness  Neuro:  No weakness, tingling, memory problems  Psych:  No fatigue, insomnia, mood problems, depression  Endocrine:  No polyuria, polydypsia, cold/heat intolerance  Heme:  No petechiae, ecchymosis, easy bruisability  Skin:  No rash, tattoos, scars, edema      Vital Signs Last 24 Hrs  T(C): 36.7 (06 Nov 2018 04:29), Max: 36.9 (05 Nov 2018 20:10)  T(F): 98 (06 Nov 2018 04:29), Max: 98.5 (05 Nov 2018 20:10)  HR: 69 (06 Nov 2018 04:29) (61 - 69)  BP: 136/68 (06 Nov 2018 04:29) (95/55 - 164/68)  BP(mean): --  RR: 17 (06 Nov 2018 04:29) (17 - 18)  SpO2: 96% (06 Nov 2018 04:29) (93% - 96%)    PHYSICAL EXAM:    Constitutional: NAD, lying in bed  HEENT: ncat  Neck: No LAD  Respiratory: dec bs  Cardiovascular: S1 and S2, RRR  Gastrointestinal: soft minimal ttp mild dt perc kevin tube in place w minimal output  Extremities: no edema  Vascular: 2+ peripheral pulses  Neurological: awake alert  Skin: No rashes    LABS:                        8.9    12.00 )-----------( 213      ( 05 Nov 2018 09:56 )             29.4     11-05    135  |  98  |  57<H>  ----------------------------<  159<H>  4.0   |  25  |  6.10<H>    Ca    8.5      05 Nov 2018 09:56  Phos  2.6     11-04  Mg     2.2     11-04    TPro  6.3  /  Alb  1.7<L>  /  TBili  1.4<H>  /  DBili  x   /  AST  31  /  ALT  40  /  AlkPhos  348<H>  11-05          RADIOLOGY & ADDITIONAL TESTS:

## 2018-11-06 NOTE — PROGRESS NOTE ADULT - SUBJECTIVE AND OBJECTIVE BOX
Subjective: pt OOB and tolerating po    Objective:  Vital Signs Last 24 Hrs  T(C): 36.7 (06 Nov 2018 04:29), Max: 36.9 (05 Nov 2018 20:10)  T(F): 98 (06 Nov 2018 04:29), Max: 98.5 (05 Nov 2018 20:10)  HR: 69 (06 Nov 2018 04:29) (61 - 69)  BP: 136/68 (06 Nov 2018 04:29) (95/55 - 164/68)  BP(mean): --  RR: 17 (06 Nov 2018 04:29) (17 - 18)  SpO2: 96% (06 Nov 2018 04:29) (93% - 96%)    Heent: QUENTIN, FREOM  Pul: decreased at bases  Cor: RSR, without murmurs  Abdomen: normal bowel sounds, without distension  Cholecystostomy tube with small amount of bilious drainage  Extremities: without edema, motor/sensory intact, without calf pain, Homans sign negative.                        8.4    12.65 )-----------( 269      ( 06 Nov 2018 09:53 )             27.3       11-06    132<L>  |  96  |  75<H>  ----------------------------<  227<H>  4.2   |  24  |  7.90<H>    Ca    8.2<L>      06 Nov 2018 09:53    TPro  6.4  /  Alb  1.7<L>  /  TBili  1.1  /  DBili  x   /  AST  22  /  ALT  31  /  AlkPhos  325<H>  11-06 11-05 @ 07:01  -  11-06 @ 07:00  --------------------------------------------------------  IN:    Solution: 50 mL  Total IN: 50 mL    OUT:    Drain: 4 mL  Total OUT: 4 mL    Total NET: 46 mL

## 2018-11-07 LAB
CULTURE RESULTS: SIGNIFICANT CHANGE UP
CULTURE RESULTS: SIGNIFICANT CHANGE UP
SPECIMEN SOURCE: SIGNIFICANT CHANGE UP
SPECIMEN SOURCE: SIGNIFICANT CHANGE UP

## 2018-11-07 PROCEDURE — 99232 SBSQ HOSP IP/OBS MODERATE 35: CPT

## 2018-11-07 RX ORDER — PANTOPRAZOLE SODIUM 20 MG/1
1 TABLET, DELAYED RELEASE ORAL
Qty: 30 | Refills: 0 | OUTPATIENT
Start: 2018-11-07 | End: 2018-12-06

## 2018-11-07 RX ORDER — URSODIOL 250 MG/1
1 TABLET, FILM COATED ORAL
Qty: 60 | Refills: 0 | OUTPATIENT
Start: 2018-11-07 | End: 2018-12-06

## 2018-11-07 RX ORDER — SIMETHICONE 80 MG/1
1 TABLET, CHEWABLE ORAL
Qty: 120 | Refills: 0 | OUTPATIENT
Start: 2018-11-07 | End: 2018-12-06

## 2018-11-07 RX ADMIN — HEPARIN SODIUM 5000 UNIT(S): 5000 INJECTION INTRAVENOUS; SUBCUTANEOUS at 05:16

## 2018-11-07 RX ADMIN — Medication 50 MILLIGRAM(S): at 17:38

## 2018-11-07 RX ADMIN — ERTAPENEM SODIUM 100 MILLIGRAM(S): 1 INJECTION, POWDER, LYOPHILIZED, FOR SOLUTION INTRAMUSCULAR; INTRAVENOUS at 17:53

## 2018-11-07 RX ADMIN — Medication 60 MILLIGRAM(S): at 05:16

## 2018-11-07 RX ADMIN — HEPARIN SODIUM 5000 UNIT(S): 5000 INJECTION INTRAVENOUS; SUBCUTANEOUS at 22:18

## 2018-11-07 RX ADMIN — GABAPENTIN 300 MILLIGRAM(S): 400 CAPSULE ORAL at 12:56

## 2018-11-07 RX ADMIN — PANTOPRAZOLE SODIUM 40 MILLIGRAM(S): 20 TABLET, DELAYED RELEASE ORAL at 05:32

## 2018-11-07 RX ADMIN — Medication 100 MILLIGRAM(S): at 15:14

## 2018-11-07 RX ADMIN — ATORVASTATIN CALCIUM 40 MILLIGRAM(S): 80 TABLET, FILM COATED ORAL at 22:18

## 2018-11-07 RX ADMIN — Medication 100 MILLIGRAM(S): at 22:18

## 2018-11-07 RX ADMIN — Medication 325 MILLIGRAM(S): at 05:17

## 2018-11-07 RX ADMIN — ISOSORBIDE MONONITRATE 60 MILLIGRAM(S): 60 TABLET, EXTENDED RELEASE ORAL at 05:17

## 2018-11-07 RX ADMIN — Medication 50 MILLIGRAM(S): at 05:17

## 2018-11-07 RX ADMIN — URSODIOL 300 MILLIGRAM(S): 250 TABLET, FILM COATED ORAL at 17:38

## 2018-11-07 RX ADMIN — Medication 325 MILLIGRAM(S): at 22:18

## 2018-11-07 RX ADMIN — Medication 81 MILLIGRAM(S): at 12:56

## 2018-11-07 RX ADMIN — Medication 1 TABLET(S): at 12:56

## 2018-11-07 RX ADMIN — Medication 4: at 17:36

## 2018-11-07 RX ADMIN — Medication 325 MILLIGRAM(S): at 15:14

## 2018-11-07 RX ADMIN — CLOPIDOGREL BISULFATE 75 MILLIGRAM(S): 75 TABLET, FILM COATED ORAL at 12:56

## 2018-11-07 RX ADMIN — Medication 2: at 12:46

## 2018-11-07 RX ADMIN — ISOSORBIDE MONONITRATE 30 MILLIGRAM(S): 60 TABLET, EXTENDED RELEASE ORAL at 17:37

## 2018-11-07 RX ADMIN — Medication 6: at 09:10

## 2018-11-07 RX ADMIN — HEPARIN SODIUM 5000 UNIT(S): 5000 INJECTION INTRAVENOUS; SUBCUTANEOUS at 15:14

## 2018-11-07 RX ADMIN — CHLORHEXIDINE GLUCONATE 1 APPLICATION(S): 213 SOLUTION TOPICAL at 12:57

## 2018-11-07 RX ADMIN — URSODIOL 300 MILLIGRAM(S): 250 TABLET, FILM COATED ORAL at 05:17

## 2018-11-07 NOTE — PROGRESS NOTE ADULT - SUBJECTIVE AND OBJECTIVE BOX
Patient is a 68y old  Female who presents with a chief complaint of Sepsis (31 Oct 2018 07:06)  Patient seen in follow up for ESRD on HD. s/p Perc Cholecystostomy. + blood cultures ESBL     No new complaints.     PAST MEDICAL HISTORY:  ESRD (end stage renal disease) on dialysis  CAD (coronary artery disease)  Diabetes  CRF (chronic renal failure)  Hypertension  Pneumonia  Myocardial infarction  Hypertension  Diabetes     MEDICATIONS  (STANDING):  aspirin enteric coated 81 milliGRAM(s) Oral daily  atorvastatin 40 milliGRAM(s) Oral at bedtime  calcium carbonate 1250 mG  + Vitamin D (OsCal 500 + D) 1 Tablet(s) Oral daily  clopidogrel Tablet 75 milliGRAM(s) Oral daily  dextrose 5%. 1000 milliLiter(s) (50 mL/Hr) IV Continuous <Continuous>  dextrose 50% Injectable 12.5 Gram(s) IV Push once  dextrose 50% Injectable 25 Gram(s) IV Push once  dextrose 50% Injectable 25 Gram(s) IV Push once  docusate sodium 100 milliGRAM(s) Oral three times a day  ertapenem  IVPB 500 milliGRAM(s) IV Intermittent every 24 hours  ferrous    sulfate 325 milliGRAM(s) Oral three times a day  gabapentin 300 milliGRAM(s) Oral daily  heparin  Injectable 5000 Unit(s) SubCutaneous every 8 hours  insulin lispro (HumaLOG) corrective regimen sliding scale   SubCutaneous Before meals and at bedtime  isosorbide   mononitrate ER Tablet (IMDUR) 30 milliGRAM(s) Oral every 24 hours  isosorbide   mononitrate ER Tablet (IMDUR) 60 milliGRAM(s) Oral <User Schedule>  metoprolol tartrate 50 milliGRAM(s) Oral two times a day  NIFEdipine XL 60 milliGRAM(s) Oral daily  pantoprazole    Tablet 40 milliGRAM(s) Oral before breakfast  senna 2 Tablet(s) Oral at bedtime  ursodiol Capsule 300 milliGRAM(s) Oral every 12 hours    MEDICATIONS  (PRN):  acetaminophen   Tablet .. 650 milliGRAM(s) Oral every 6 hours PRN Mild Pain (1 - 3)  dextrose 40% Gel 15 Gram(s) Oral once PRN Blood Glucose LESS THAN 70 milliGRAM(s)/deciliter  glucagon  Injectable 1 milliGRAM(s) IntraMuscular once PRN Glucose LESS THAN 70 milligrams/deciliter  HYDROmorphone  Injectable 0.25 milliGRAM(s) IV Push every 4 hours PRN Moderate Pain (4 - 6)  HYDROmorphone  Injectable 0.5 milliGRAM(s) IV Push every 4 hours PRN Severe Pain (7 - 10)  simethicone 80 milliGRAM(s) Chew every 6 hours PRN abdominal bloating    T(C): 36.8 (11-05-18 @ 04:55), Max: 37.2 (11-03-18 @ 16:01)  HR: 63 (11-05-18 @ 11:47) (58 - 75)  BP: 129/54 (11-05-18 @ 04:55) (107/55 - 130/69)  RR: 17 (11-05-18 @ 06:17)  SpO2: 94% (11-05-18 @ 11:47)  Wt(kg): --  I&O's Detail    04 Nov 2018 07:01  -  05 Nov 2018 07:00  --------------------------------------------------------  IN:  Total IN: 0 mL    OUT:    Drain: 3 mL  Total OUT: 3 mL    Total NET: -3 mL    PHYSICAL EXAM:  General: NAD, Rt chest dialysis catheter  Respiratory: b/l air entry  Cardiovascular: S1 S2  Gastrointestinal: soft  Extremities:  no edema                LABORATORY:                        8.9    12.00 )-----------( 213      ( 05 Nov 2018 09:56 )             29.4     11-05    135  |  98  |  57<H>  ----------------------------<  159<H>  4.0   |  25  |  6.10<H>    Ca    8.5      05 Nov 2018 09:56  Phos  2.6     11-04  Mg     2.2     11-04    TPro  6.3  /  Alb  1.7<L>  /  TBili  1.4<H>  /  DBili  x   /  AST  31  /  ALT  40  /  AlkPhos  348<H>  11-05    Sodium, Serum: 135 mmol/L (11-05 @ 09:56)  Sodium, Serum: 133 mmol/L (11-04 @ 10:19)    Potassium, Serum: 4.0 mmol/L (11-05 @ 09:56)  Potassium, Serum: 3.8 mmol/L (11-04 @ 10:19)    Hemoglobin: 8.9 g/dL (11-05 @ 09:56)  Hemoglobin: 9.2 g/dL (11-04 @ 16:40)  Hemoglobin: 9.5 g/dL (11-04 @ 10:19)  Hemoglobin: 9.5 g/dL (11-03 @ 05:35)    Creatinine, Serum 6.10 (11-05 @ 09:56)  Creatinine, Serum 4.30 (11-04 @ 10:19)  Creatinine, Serum 5.40 (11-03 @ 05:35)        LIVER FUNCTIONS - ( 05 Nov 2018 09:56 )  Alb: 1.7 g/dL / Pro: 6.3 g/dL / ALK PHOS: 348 U/L / ALT: 40 U/L / AST: 31 U/L / GGT: x

## 2018-11-07 NOTE — DISCHARGE NOTE ADULT - NSTOBACCOWEBSITE_GEN_A_CS
Pt. Up to 4 liters o2. Doesn't wear o2 at home but felt sob after working with OT in shower this am and was bumped form 2 liters to 3 liters, crackles in bases noted. Gave am dose of lasix and then up in chair and felt sob again. Pox on 3 liters was 96% but bumped to 4 liters now for pt's comfort. Crackles no longer present.   [place] website --- [email or web address] Website --- [email or web address]

## 2018-11-07 NOTE — PROGRESS NOTE ADULT - ASSESSMENT
Patient is now  status post cholecystotomy tube for biliary sepsis on 10/30.  She had rapid atrial fibrillation on 10/30 and converted to sinus rhythm. She required pressor therapy which has now been discontinued. Her white blood count improved today remains hemodynamically stable. She has chronic renal failure on hemodialysis. Her elevated troponin level may represent a component of subendocardial ischemia without true atherothrombosis. These results are nonspecific in the setting of her kidney disease. Currently afebrile and arousable with no evidence of decompensated heart failure or active ischemic    Recommend:  - cont dual antiplatelet therapy for now  - DVT prophylaxis  - Abx per ID  - cont statin Rx  - cont metoprolol  - cont isosorbide mononitrate  - cont nifedipine for HTN  - renal f/u and volume mgmt with HD  - D/C planning

## 2018-11-07 NOTE — PROGRESS NOTE ADULT - SUBJECTIVE AND OBJECTIVE BOX
INTERVAL HPI/OVERNIGHT EVENTS:  pt seen and examined  denies abd pain  per overnight rn pt with 2 soft bms, no diarrhea, no overt s/s gib  afebrile overnight no new labs to assess    MEDICATIONS  (STANDING):  aspirin enteric coated 81 milliGRAM(s) Oral daily  atorvastatin 40 milliGRAM(s) Oral at bedtime  calcium carbonate 1250 mG  + Vitamin D (OsCal 500 + D) 1 Tablet(s) Oral daily  chlorhexidine 2% Cloths 1 Application(s) Topical daily  clopidogrel Tablet 75 milliGRAM(s) Oral daily  dextrose 5%. 1000 milliLiter(s) (50 mL/Hr) IV Continuous <Continuous>  dextrose 50% Injectable 12.5 Gram(s) IV Push once  dextrose 50% Injectable 25 Gram(s) IV Push once  dextrose 50% Injectable 25 Gram(s) IV Push once  docusate sodium 100 milliGRAM(s) Oral three times a day  ertapenem  IVPB 500 milliGRAM(s) IV Intermittent every 24 hours  ferrous    sulfate 325 milliGRAM(s) Oral three times a day  gabapentin 300 milliGRAM(s) Oral daily  heparin  Injectable 5000 Unit(s) SubCutaneous every 8 hours  insulin lispro (HumaLOG) corrective regimen sliding scale   SubCutaneous Before meals and at bedtime  isosorbide   mononitrate ER Tablet (IMDUR) 30 milliGRAM(s) Oral every 24 hours  isosorbide   mononitrate ER Tablet (IMDUR) 60 milliGRAM(s) Oral <User Schedule>  metoprolol tartrate 50 milliGRAM(s) Oral two times a day  NIFEdipine XL 60 milliGRAM(s) Oral daily  pantoprazole    Tablet 40 milliGRAM(s) Oral before breakfast  polyethylene glycol 3350 17 Gram(s) Oral daily  senna 2 Tablet(s) Oral at bedtime  ursodiol Capsule 300 milliGRAM(s) Oral every 12 hours    MEDICATIONS  (PRN):  acetaminophen   Tablet .. 650 milliGRAM(s) Oral every 6 hours PRN Mild Pain (1 - 3)  dextrose 40% Gel 15 Gram(s) Oral once PRN Blood Glucose LESS THAN 70 milliGRAM(s)/deciliter  glucagon  Injectable 1 milliGRAM(s) IntraMuscular once PRN Glucose LESS THAN 70 milligrams/deciliter  HYDROmorphone  Injectable 0.25 milliGRAM(s) IV Push every 4 hours PRN Moderate Pain (4 - 6)  HYDROmorphone  Injectable 0.5 milliGRAM(s) IV Push every 4 hours PRN Severe Pain (7 - 10)  simethicone 80 milliGRAM(s) Chew every 6 hours PRN abdominal bloating      Allergies    No Known Drug Allergies  Purell (Rash)    Intolerances        Review of Systems:    General:  No wt loss, fevers, chills, night sweats, fatigue   Eyes:  Good vision, no reported pain  ENT:  No sore throat, pain, runny nose, dysphagia  CV:  No pain, palpitations, hypo/hypertension  Resp:  No dyspnea, cough, tachypnea, wheezing  GI:  No pain, No nausea, No vomiting, No diarrhea, No constipation, No weight loss, No fever, No pruritis, No rectal bleeding, No melena, No dysphagia  :  No pain, bleeding, incontinence, nocturia  Muscle:  No pain, weakness  Neuro:  No weakness, tingling, memory problems  Psych:  No fatigue, insomnia, mood problems, depression  Endocrine:  No polyuria, polydypsia, cold/heat intolerance  Heme:  No petechiae, ecchymosis, easy bruisability  Skin:  No rash, tattoos, scars, edema      Vital Signs Last 24 Hrs  T(C): 36.6 (07 Nov 2018 04:33), Max: 36.8 (06 Nov 2018 17:10)  T(F): 97.9 (07 Nov 2018 04:33), Max: 98.2 (06 Nov 2018 17:10)  HR: 71 (07 Nov 2018 04:33) (63 - 74)  BP: 157/70 (07 Nov 2018 04:33) (100/50 - 157/70)  BP(mean): --  RR: 17 (07 Nov 2018 04:33) (17 - 18)  SpO2: 95% (07 Nov 2018 04:33) (94% - 97%)    PHYSICAL EXAM:  Constitutional: NAD, lying in bed  HEENT: ncat  Neck: No LAD  Respiratory: dec bs  Cardiovascular: S1 and S2, RRR  Gastrointestinal: soft nt mild dt perc kevin tube in place w minimal output  Extremities: no edema  Vascular: 2+ peripheral pulses  Neurological: awake alert  Skin: No rashes      LABS:                        8.4    12.65 )-----------( 269      ( 06 Nov 2018 09:53 )             27.3     11-06    132<L>  |  96  |  75<H>  ----------------------------<  227<H>  4.2   |  24  |  7.90<H>    Ca    8.2<L>      06 Nov 2018 09:53    TPro  6.4  /  Alb  1.7<L>  /  TBili  1.1  /  DBili  x   /  AST  22  /  ALT  31  /  AlkPhos  325<H>  11-06          RADIOLOGY & ADDITIONAL TESTS:

## 2018-11-07 NOTE — DISCHARGE NOTE ADULT - PROVIDER TOKENS
TOKLAQUITA:'[id number]',TOKLAQUITA:'[id number]' TOKEN:'[id number]',TOKEN:'[id number]',FREE:[LAST:[Black],FIRST:[Minder],PHONE:[(   )    -],FAX:[(   )    -]],FREE:[LAST:[Mosher],FIRST:[lisa],PHONE:[(   )    -],FAX:[(   )    -]] TOKEN:'[id number]',FREE:[LAST:[Black],FIRST:[Minder],PHONE:[(   )    -],FAX:[(   )    -]],FREE:[LAST:[Mosher],FIRST:[lisa],PHONE:[(   )    -],FAX:[(   )    -]],TOKEN:'[id number]'

## 2018-11-07 NOTE — PROGRESS NOTE ADULT - SUBJECTIVE AND OBJECTIVE BOX
pt seen  feeling good  ICU Vital Signs Last 24 Hrs  T(C): 36.6 (07 Nov 2018 04:33), Max: 36.8 (06 Nov 2018 17:10)  T(F): 97.9 (07 Nov 2018 04:33), Max: 98.2 (06 Nov 2018 17:10)  HR: 71 (07 Nov 2018 04:33) (63 - 74)  BP: 157/70 (07 Nov 2018 04:33) (100/50 - 157/70)  BP(mean): --  ABP: --  ABP(mean): --  RR: 17 (07 Nov 2018 04:33) (17 - 18)  SpO2: 95% (07 Nov 2018 04:33) (94% - 97%)  NAD  abd-soft Nt/ND, TENA, slight bilious drainage                            8.4    12.65 )-----------( 269      ( 06 Nov 2018 09:53 )             27.3   11-06    132<L>  |  96  |  75<H>  ----------------------------<  227<H>  4.2   |  24  |  7.90<H>    Ca    8.2<L>      06 Nov 2018 09:53    TPro  6.4  /  Alb  1.7<L>  /  TBili  1.1  /  DBili  x   /  AST  22  /  ALT  31  /  AlkPhos  325<H>  11-06

## 2018-11-07 NOTE — PROGRESS NOTE ADULT - PROBLEM SELECTOR PLAN 3
no new labs  likely multifactorial, sepsis, acute illness, renal insufficiency  hgb trend noted  no overt s/s gib  trend h/h, transfuse prn  consider iron studies  ppi qd  if hgb continues to downtrend check fobt  will follow

## 2018-11-07 NOTE — DISCHARGE NOTE ADULT - PLAN OF CARE
secondary to sepsis with ecoli/esbl, completed iv abx therapy would need outpt follow   needs IR for the tube study in 2 weeks resume home meds [facility] monday, wensday, friday Resolution During your hospital stay you were treated with IV antibiotics and completed your course. A cholecystostomy tube was placed and then removed before discharged. Continue with home medications aspirin, Plavix, Imdur, metoprolol  Follow up with your PCP and your cardiologist Continue with home medication Lantus Continue with hemodialysis M, W, F Continue with home medication of Metroprolol, nifedipine During your hospital stay you were treated with IV antibiotics and completed your course. A cholecystostomy tube was placed and then removed before discharged.   -A repeat ERCP was done that showed a stone that was unable to be removed. Your Liver enzymes have been stable.    -Please do blood work to check your liver enzymes (LFTs) 3-4 days after discharge and follow-up with your Gastroenterologist. During your hospital stay you were treated with IV antibiotics and completed your course. A cholecystostomy tube was placed and then removed before discharged.   -A repeat ERCP was done that showed a stone that was unable to be removed. Your Liver enzymes have been stable.    -Please do blood work to check your liver enzymes (LFTs) 3-4 days after discharge and follow-up with your Gastroenterologist within 3-4 days. Continue with home medications aspirin, Plavix, Imdur, metoprolol  Follow up with your PCP and your cardiologist (Dr. Yoel Mosher) -Continue with home medication Lantus  -Follow-up with your PCP 3-4 days after discharge. -Continue with hemodialysis M, W, F  -Follow-up with your Renal doctor (Dr. Albarran) within 1 week of discharge. -Continue with home medication of Metroprolol, nifedipine.  -Follow-up with your PCP 3-4 days after discharge. During your hospital stay you were treated with IV antibiotics and completed your course. A cholecystostomy tube was placed and then removed before discharged.   -A repeat ERCP was done that showed a stone that was unable to be removed. Your Liver enzymes on discharge were stable.    -Please do blood work to check your liver enzymes (LFTs) 3-4 days after discharge and follow-up with your Gastroenterologist within 3-4 days.  -Follow-up with your surgeon within 1 week of discharge.   -You will need to go to [facility] to regain strength, and receive dialysis. During your hospital stay you were treated with IV antibiotics and completed your course. A cholecystostomy tube was placed and then removed before discharged.   -A repeat ERCP was done that showed a stone that was unable to be removed. You developed abdominal pain with vomiting and found to have elevated liver enzymes, and markers of acute inflammation. You were transferred to [facility]  -Please do blood work to check your liver enzymes (LFTs) 3-4 days after discharge and follow-up with your Gastroenterologist within 3-4 days.  -Follow-up with your surgeon within 1 week of discharge.

## 2018-11-07 NOTE — DISCHARGE NOTE ADULT - CARE PROVIDER_API CALL
Jaden Albarran), Medicine  [address] [facility]  [address] [place] [address]  Phone: [phone]  Fax: [phone]    Brayden Tate (), Gastroenterology; Internal Medicine  [address] [place] [address]  Phone: [phone]  Fax: [phone] Jaden Albarran), Medicine  [address] [facility]  [address] [place] [address]  Phone: [phone]  Fax: [phone]    Brayden Tate (), Gastroenterology; Internal Medicine  [address] [place] [address]  Phone: [phone]  Fax: [phone]    Everardo Black  Phone: (   )    -  Fax: (   )    -    lisa Mosher  Phone: (   )    -  Fax: (   )    - Jaden Albarran), Medicine  [address] [facility]  [address] [place] [address]  Phone: [phone]  Fax: [phone]    Everardo Black  Phone: (   )    -  Fax: (   )    -    lisa Mosher  Phone: (   )    -  Fax: (   )    -    Constantine Dailey (DO), Gastroenterology; Internal Medicine  [address] [place] [address]  Phone: [phone]  Fax: [phone]

## 2018-11-07 NOTE — PROGRESS NOTE ADULT - SUBJECTIVE AND OBJECTIVE BOX
Follow up: CAD, HTN, RBBB    HPI:  Patient is awake and alert with no specific complaints. She reports no pain, dyspnea, or palpitations    PAST MEDICAL & SURGICAL HISTORY:  ESRD (end stage renal disease) on dialysis: MWF  CAD (coronary artery disease): s/p stent in 2007  Diabetes  Myocardial infarction  Hypertension  H/O: hysterectomy      MEDICATIONS  (STANDING):  aspirin enteric coated 81 milliGRAM(s) Oral daily  atorvastatin 40 milliGRAM(s) Oral at bedtime  calcium carbonate 1250 mG  + Vitamin D (OsCal 500 + D) 1 Tablet(s) Oral daily  chlorhexidine 2% Cloths 1 Application(s) Topical daily  clopidogrel Tablet 75 milliGRAM(s) Oral daily  dextrose 5%. 1000 milliLiter(s) (50 mL/Hr) IV Continuous <Continuous>  dextrose 50% Injectable 12.5 Gram(s) IV Push once  dextrose 50% Injectable 25 Gram(s) IV Push once  dextrose 50% Injectable 25 Gram(s) IV Push once  docusate sodium 100 milliGRAM(s) Oral three times a day  ertapenem  IVPB 500 milliGRAM(s) IV Intermittent every 24 hours  ferrous    sulfate 325 milliGRAM(s) Oral three times a day  gabapentin 300 milliGRAM(s) Oral daily  heparin  Injectable 5000 Unit(s) SubCutaneous every 8 hours  insulin lispro (HumaLOG) corrective regimen sliding scale   SubCutaneous Before meals and at bedtime  isosorbide   mononitrate ER Tablet (IMDUR) 30 milliGRAM(s) Oral every 24 hours  isosorbide   mononitrate ER Tablet (IMDUR) 60 milliGRAM(s) Oral <User Schedule>  metoprolol tartrate 50 milliGRAM(s) Oral two times a day  NIFEdipine XL 60 milliGRAM(s) Oral daily  pantoprazole    Tablet 40 milliGRAM(s) Oral before breakfast  polyethylene glycol 3350 17 Gram(s) Oral daily  ursodiol Capsule 300 milliGRAM(s) Oral every 12 hours    MEDICATIONS  (PRN):  acetaminophen   Tablet .. 650 milliGRAM(s) Oral every 6 hours PRN Mild Pain (1 - 3)  dextrose 40% Gel 15 Gram(s) Oral once PRN Blood Glucose LESS THAN 70 milliGRAM(s)/deciliter  glucagon  Injectable 1 milliGRAM(s) IntraMuscular once PRN Glucose LESS THAN 70 milligrams/deciliter  HYDROmorphone  Injectable 0.25 milliGRAM(s) IV Push every 4 hours PRN Moderate Pain (4 - 6)  HYDROmorphone  Injectable 0.5 milliGRAM(s) IV Push every 4 hours PRN Severe Pain (7 - 10)  simethicone 80 milliGRAM(s) Chew every 6 hours PRN abdominal bloating    Vital Signs Last 24 Hrs  T(C): 36.6 (07 Nov 2018 04:33), Max: 36.8 (06 Nov 2018 17:10)  T(F): 97.9 (07 Nov 2018 04:33), Max: 98.2 (06 Nov 2018 17:10)  HR: 71 (07 Nov 2018 04:33) (64 - 74)  BP: 157/70 (07 Nov 2018 04:33) (112/65 - 157/70)  BP(mean): --  RR: 17 (07 Nov 2018 04:33) (17 - 18)  SpO2: 95% (07 Nov 2018 04:33) (94% - 97%)    I&O's Summary    06 Nov 2018 07:01  -  07 Nov 2018 07:00  --------------------------------------------------------  IN: 950 mL / OUT: 2900 mL / NET: -1950 mL        PHYSICAL EXAM:    Constitutional: NAD, awake and alert  Eyes:   Pupils round, no lesions  ENMT: no exudate or erythema  Pulmonary: Non-labored, breath sounds are clear bilaterally, No wheezing, rales or rhonchi  Cardiovascular: PMI not palpable RRR normal S1 and S2, no murmurs, rubs, gallops or clicks  Gastrointestinal: Bowel Sounds present, soft, nontender.   Lymph: No cervical lymphadenopathy.  Neurological: Alert, no focal deficits  Skin: No rashes.  No cyanosis.  Psych:  Mood & affect appropriate   Ext: No lower ext edema                                8.4    12.65 )-----------( 269      ( 06 Nov 2018 09:53 )             27.3     11-06    132<L>  |  96  |  75<H>  ----------------------------<  227<H>  4.2   |  24  |  7.90<H>    Ca    8.2<L>      06 Nov 2018 09:53    TPro  6.4  /  Alb  1.7<L>  /  TBili  1.1  /  DBili  x   /  AST  22  /  ALT  31  /  AlkPhos  325<H>  11-06      < from: 12 Lead ECG (11.02.18 @ 08:59) >    Ventricular Rate 72 BPM    Atrial Rate 72 BPM    P-R Interval 156 ms    QRS Duration 156 ms    Q-T Interval 444 ms    QTC Calculation(Bezet) 486 ms    P Axis 50 degrees    R Axis 90 degrees    T Axis -14 degrees    Diagnosis Line Normal sinus rhythm  Right bundle branch block  Inferior infarct (cited on or before 31-OCT-2018)  Abnormal ECG    Confirmed by Jovanna Hunter ([id number]) on 11/3/2018 11:50:16 AM    < end of copied text >  < from: Xray Chest 1 View-PORTABLE IMMEDIATE (10.31.18 @ 21:52) >    EXAM:  XR CHEST PORTABLE IMMED 1V                            PROCEDURE DATE:  10/31/2018          INTERPRETATION:  Chest portable.    Clinical History: Nasogastric tube placement.    Comparison: 10/30/2018.    Single AP view submitted.    Patient is rotated.    Nasogastric tube is present, tip below the level of the hemidiaphragm.    Again noted is right-sided dialysis catheter.  The left-sided central venous catheter appears to been removed.    The evaluation of the cardiomediastinal silhouette is limited on portable   technique.    Low lung volumes are present.  Again noted is perihilar airspace consolidation, most pronounced in the   right.  There are probable small bilateral pleural effusions.    Impression:    Findings as discussed above.                      NOELLE COLEMAN M.D., ATTENDING RADIOLOGIST  This document has been electronically signed. Nov 1 2018  8:08AM                < end of copied text >  < from: TTE Echo Doppler w/o Cont (05.04.18 @ 20:56) >     EXAM:  ECHO TTE W/O CON COMP W/DOPPLR         PROCEDURE DATE:  05/04/2018        INTERPRETATION:  Height 165cm. weight 95kg. blood pressure 129/81.   Technician TE. Indication for study dyspnea.    Aortic root 2.3 cm left atrium 4.4 cm left ventricular end-diastolic   diameter 5.7 cm left ventricular end-systolic diameter 4.3 cm septal wall   thickness 1.2 cm posterior wall thickness 1.2 cm visually equivocally   estimated ejection fraction 50-55%.    The aortic root is calcified and of normaldiameter. 3 distinct leaflets   of the aortic valve are not well demonstrated by this study. The   technician was unable to identify any significant gradient across this   valve to suggest hemodynamically significant aortic stenosis. Left atrium   isenlarged. The left ventricle was difficult to visualize by all   standard echocardiographic windows. Possibly the end-diastolic diameter   is mildly increased. The wall thickness is borderline increased. Any   significant focal wall motion abnormality cannot be ascertained by this   study. Visually estimated ejection fraction 50-55%. The mitral annulus is   calcified. The mitral valve leaflets are mildly thickened with a normal   EF slope and excursion. There is no evidence for hemodynamically   significant mitral stenosis. On the very limited color flow Doppler   portion examination mild mitral regurgitation suggested.    Conclusion:  1. Technically difficult limited supine study. Please note that this is a   portable study and the study is also limited due to patient's body   habitus.  2. 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  3. Enlarged left atrium with associated mild mitral regurgitation.  4. Very limited visualization left ventricle which may represent mild   left ventricular concentric hypertrophy with a well-preserved ejection   fraction as described above.  5. A very small posterior pericardial effusion is suggested but not   diagnostic.  6. Correlate these limited findings clinically.                    SALVADOR PHILLIPS M.D., ATTENDING CARDIOLOGIST  This document has been electronically signed. May  5 2018  9:47AM                < end of copied text >

## 2018-11-07 NOTE — DISCHARGE NOTE ADULT - ADDITIONAL INSTRUCTIONS
f/u pmd and nephro in one week -Patient is for [facility]   -Will need repeat LFTs 3-4 days after discharge from the hospital, if unstable after discharge then it is likely that patient may need Percutaneous Transhepatic Cholangiography -Transfer to [facility] due to abdominal pain, vomiting with associated rise in WBC, procalcitonin, liver enzymes -Transfer to [facility] due to ensuing severe secpsis sec to choledocholethiasis assoc with abdominal pain, vomiting with associated rise in WBC, procalcitonin, liver enzymes; pt needs higher level of care at tertiary facility with poss IR approach for cholecystectomy and ongoig care and management

## 2018-11-07 NOTE — DISCHARGE NOTE ADULT - MEDICATION SUMMARY - MEDICATIONS TO TAKE
I will START or STAY ON the medications listed below when I get home from the hospital:    Aspirin Enteric Coated 81 mg oral delayed release tablet  -- 1 tab(s) by mouth once a day  -- Indication: For CAD (coronary artery disease)    isosorbide mononitrate 30 mg oral tablet, extended release  -- 2 tab(s) by mouth once a day (in the morning)  -- Indication: For CAD (coronary artery disease)    isosorbide mononitrate 30 mg oral tablet, extended release  -- 1 tab(s) by mouth once a day (in the evening)  -- Indication: For CAD (coronary artery disease)    gabapentin 300 mg oral capsule  -- 1 cap(s) by mouth 2 times a day  -- Indication: For Neuroapthy    Lantus Solostar Pen 100 units/mL subcutaneous solution  -- 24 unit(s) subcutaneous once a day (in the morning)   -- Do not drink alcoholic beverages when taking this medication.  It is very important that you take or use this exactly as directed.  Do not skip doses or discontinue unless directed by your doctor.  Keep in refrigerator.  Do not freeze.    -- Indication: For Dm    rosuvastatin 20 mg oral tablet  -- 1 tab(s) by mouth once a day  -- Indication: For Hld    clopidogrel 75 mg oral tablet  -- 1 tab(s) by mouth once a day  -- Indication: For CAD (coronary artery disease)    metoprolol tartrate 50 mg oral tablet  -- 1 tab(s) by mouth 2 times a day  -- Indication: For Htn    NIFEdipine 60 mg oral tablet, extended release  -- 1 tab(s) by mouth once a day  -- Indication: For Afib    epoetin analilia  -- 00361 unit(s) intravenously once a month  -- Indication: For ESRD (end stage renal disease) on dialysis    ursodiol 300 mg oral capsule  -- 1 cap(s) by mouth every 12 hours  -- Indication: For gallstone    ferrous sulfate 325 mg (65 mg elemental iron) oral tablet  -- 1 tab(s) by mouth 3 times a day  -- Indication: For heraclio    docusate  -- 100 milligram(s) by mouth once a day  -- Indication: For Conistpation    simethicone 80 mg oral tablet, chewable  -- 1 tab(s) by mouth every 6 hours, As needed, abdominal bloating  -- Indication: For Conesitption    pantoprazole 40 mg oral delayed release tablet  -- 1 tab(s) by mouth once a day (before a meal)  -- Indication: For gerd    calcium-vitamin D 500 mg-200 intl units oral tablet  -- 1 tab(s) by mouth once a day  -- Indication: For mvi I will START or STAY ON the medications listed below when I get home from the hospital:    Aspirin Enteric Coated 81 mg oral delayed release tablet  -- 1 tab(s) by mouth once a day  -- Indication: For CAD (coronary artery disease)    isosorbide mononitrate 30 mg oral tablet, extended release  -- 2 tab(s) by mouth once a day (in the morning)  -- Indication: For CAD (coronary artery disease)    isosorbide mononitrate 30 mg oral tablet, extended release  -- 1 tab(s) by mouth once a day (in the evening)  -- Indication: For CAD (coronary artery disease)    gabapentin 300 mg oral capsule  -- 1 cap(s) by mouth 2 times a day  -- Indication: For Neuroapthy    Lantus Solostar Pen 100 units/mL subcutaneous solution  -- 24 unit(s) subcutaneous once a day (in the morning)   -- Do not drink alcoholic beverages when taking this medication.  It is very important that you take or use this exactly as directed.  Do not skip doses or discontinue unless directed by your doctor.  Keep in refrigerator.  Do not freeze.    -- Indication: For Dm    rosuvastatin 20 mg oral tablet  -- 1 tab(s) by mouth once a day  -- Indication: For Hld    clopidogrel 75 mg oral tablet  -- 1 tab(s) by mouth once a day  -- Indication: For CAD (coronary artery disease)    metoprolol tartrate 50 mg oral tablet  -- 1 tab(s) by mouth 2 times a day  -- Indication: For Htn    NIFEdipine 60 mg oral tablet, extended release  -- 1 tab(s) by mouth once a day  -- Indication: For Afib    epoetin analilia  -- 68270 unit(s) intravenously once a month  -- Indication: For ESRD (end stage renal disease) on dialysis    ursodiol 300 mg oral capsule  -- 1 cap(s) by mouth every 12 hours  -- Indication: For gallstone    ferrous sulfate 325 mg (65 mg elemental iron) oral tablet  -- 1 tab(s) by mouth 3 times a day  -- Indication: For heraclio    docusate  -- 100 milligram(s) by mouth once a day  -- Indication: For Conistpation    simethicone 80 mg oral tablet, chewable  -- 1 tab(s) by mouth every 6 hours, As needed, abdominal bloating  -- Indication: For Conesitption    pantoprazole 40 mg oral delayed release tablet  -- 1 tab(s) by mouth once a day (before a meal)  -- Indication: For gerd    calcium-vitamin D 500 mg-200 intl units oral tablet  -- 1 tab(s) by mouth once a day  -- Indication: For mvi I will START or STAY ON the medications listed below when I get home from the hospital:    Aspirin Enteric Coated 81 mg oral delayed release tablet  -- 1 tab(s) by mouth once a day  -- Indication: For CAD (coronary artery disease)    isosorbide mononitrate 30 mg oral tablet, extended release  -- 2 tab(s) by mouth once a day (in the morning)  -- Indication: For CAD (coronary artery disease)    isosorbide mononitrate 30 mg oral tablet, extended release  -- 1 tab(s) by mouth once a day (in the evening)  -- Indication: For CAD (coronary artery disease)    gabapentin 300 mg oral capsule  -- 1 cap(s) by mouth 2 times a day  -- Indication: For Neuroapthy    Lantus Solostar Pen 100 units/mL subcutaneous solution  -- 24 unit(s) subcutaneous once a day (in the morning)   -- Do not drink alcoholic beverages when taking this medication.  It is very important that you take or use this exactly as directed.  Do not skip doses or discontinue unless directed by your doctor.  Keep in refrigerator.  Do not freeze.    -- Indication: For Dm    rosuvastatin 20 mg oral tablet  -- 1 tab(s) by mouth once a day  -- Indication: For Hld    clopidogrel 75 mg oral tablet  -- 1 tab(s) by mouth once a day  -- Indication: For CAD (coronary artery disease)    metoprolol tartrate 50 mg oral tablet  -- 1 tab(s) by mouth 2 times a day  -- Indication: For Htn    NIFEdipine 60 mg oral tablet, extended release  -- 1 tab(s) by mouth once a day  -- Indication: For Afib    ertapenem  -- 500 milligram(s) intravenously once a day  -- Indication: For Cholangitis concurrent with and due to calculus of gallbladder    epoetin analilia  -- 94305 unit(s) intravenously once a month  -- Indication: For ESRD (end stage renal disease) on dialysis    ursodiol 300 mg oral capsule  -- 1 cap(s) by mouth every 12 hours  -- Indication: For gallstone    ferrous sulfate 325 mg (65 mg elemental iron) oral tablet  -- 1 tab(s) by mouth 3 times a day  -- Indication: For heraclio    docusate  -- 100 milligram(s) by mouth once a day  -- Indication: For Constipation    senna oral tablet  -- 2 tab(s) by mouth once a day (at bedtime)  -- Indication: For Constipation    simethicone 80 mg oral tablet, chewable  -- 1 tab(s) by mouth every 6 hours, As needed, abdominal bloating  -- Indication: For gas pain    pantoprazole 40 mg oral delayed release tablet  -- 1 tab(s) by mouth once a day (before a meal)  -- Indication: For gerd    calcium-vitamin D 500 mg-200 intl units oral tablet  -- 1 tab(s) by mouth once a day  -- Indication: For Need for prophylactic measure

## 2018-11-07 NOTE — DISCHARGE NOTE ADULT - PATIENT PORTAL LINK FT
You can access the [facility], offered by [facility], by registering with the following website: [email or web address]

## 2018-11-07 NOTE — PROGRESS NOTE ADULT - PROBLEM SELECTOR PLAN 1
no new labs to assess, prior trend improved  s/p perc c-tube  repeat blood/bile fluid cxs ngtd  cont abx per id, last day today  serial lfts  f/u further surgery recs  id following  monitor abd exam  pain control  diet as tolerated  will follow

## 2018-11-07 NOTE — DISCHARGE NOTE ADULT - FUNCTIONAL SCREEN CURRENT LEVEL: AMBULATION, MLM
3 = assistive equipment and person Advancement Flap (Single) Text: The defect edges were debeveled with a #15 scalpel blade.  Given the location of the defect and the proximity to free margins a single advancement flap was deemed most appropriate.  Using a sterile surgical marker, an appropriate advancement flap was drawn incorporating the defect and placing the expected incisions within the relaxed skin tension lines where possible.    The area thus outlined was incised deep to adipose tissue with a #15 scalpel blade.  The skin margins were undermined to an appropriate distance in all directions utilizing iris scissors. 2 = assistive person

## 2018-11-07 NOTE — CHART NOTE - NSCHARTNOTEFT_GEN_A_CORE
Assessment: Pt seen in bed with  at bedside. Pt speaking Roberto, spouse speaking for her.  Observed lunch tray largely untouched except for a little salmon.  Offered other meal, accepted tuna salad.  Did drink all of Glucerna shake on tray.  Pt scheduled for discharge either today or tomorrow.   BM noted x 2 11/6.      Factors impacting intake: [x] none [ ] nausea  [ ] vomiting [ ] diarrhea [ ] constipation  [ ]chewing problems [ ] swallowing issues  [ ] other:     Diet Presciption: Diet, Consistent Carbohydrate Renal/No Snacks:   Supplement Feeding Modality:  Oral  Ensure Enlive Cans or Servings Per Day:  1       Frequency:  Three Times a day (11-06-18 @ 16:45)    Intake: poor as observed at lunch meal.     Current Weight: 11/7 201.9#    Pertinent Medications: MEDICATIONS  (STANDING):  aspirin enteric coated 81 milliGRAM(s) Oral daily  atorvastatin 40 milliGRAM(s) Oral at bedtime  calcium carbonate 1250 mG  + Vitamin D (OsCal 500 + D) 1 Tablet(s) Oral daily  chlorhexidine 2% Cloths 1 Application(s) Topical daily  clopidogrel Tablet 75 milliGRAM(s) Oral daily  dextrose 5%. 1000 milliLiter(s) (50 mL/Hr) IV Continuous <Continuous>  dextrose 50% Injectable 12.5 Gram(s) IV Push once  dextrose 50% Injectable 25 Gram(s) IV Push once  dextrose 50% Injectable 25 Gram(s) IV Push once  docusate sodium 100 milliGRAM(s) Oral three times a day  ertapenem  IVPB 500 milliGRAM(s) IV Intermittent every 24 hours  ferrous    sulfate 325 milliGRAM(s) Oral three times a day  gabapentin 300 milliGRAM(s) Oral daily  heparin  Injectable 5000 Unit(s) SubCutaneous every 8 hours  insulin lispro (HumaLOG) corrective regimen sliding scale   SubCutaneous Before meals and at bedtime  isosorbide   mononitrate ER Tablet (IMDUR) 30 milliGRAM(s) Oral every 24 hours  isosorbide   mononitrate ER Tablet (IMDUR) 60 milliGRAM(s) Oral <User Schedule>  metoprolol tartrate 50 milliGRAM(s) Oral two times a day  NIFEdipine XL 60 milliGRAM(s) Oral daily  pantoprazole    Tablet 40 milliGRAM(s) Oral before breakfast  polyethylene glycol 3350 17 Gram(s) Oral daily  ursodiol Capsule 300 milliGRAM(s) Oral every 12 hours    MEDICATIONS  (PRN):  acetaminophen   Tablet .. 650 milliGRAM(s) Oral every 6 hours PRN Mild Pain (1 - 3)  dextrose 40% Gel 15 Gram(s) Oral once PRN Blood Glucose LESS THAN 70 milliGRAM(s)/deciliter  glucagon  Injectable 1 milliGRAM(s) IntraMuscular once PRN Glucose LESS THAN 70 milligrams/deciliter  HYDROmorphone  Injectable 0.25 milliGRAM(s) IV Push every 4 hours PRN Moderate Pain (4 - 6)  HYDROmorphone  Injectable 0.5 milliGRAM(s) IV Push every 4 hours PRN Severe Pain (7 - 10)  simethicone 80 milliGRAM(s) Chew every 6 hours PRN abdominal bloating    Pertinent Labs: 11-06 Na132 mmol/L<L> Glu 227 mg/dL<H> K+ 4.2 mmol/L Cr  7.90 mg/dL<H> BUN 75 mg/dL<H> 11-04 Phos 2.6 mg/dL 11-06 Alb 1.7 g/dL<L> 10-30 [id number] 9.6 %<H>     CAPILLARY BLOOD GLUCOSE      POCT Blood Glucose.: 187 mg/dL (07 Nov 2018 12:09)  POCT Blood Glucose.: 253 mg/dL (07 Nov 2018 08:26)  POCT Blood Glucose.: 288 mg/dL (06 Nov 2018 22:21)  POCT Blood Glucose.: 197 mg/dL (06 Nov 2018 17:01)    Skin: no pressure injuries  Edema: 1+ general     Estimated Needs:   [x] no change since previous assessment  [ ] recalculated:     Previous Nutrition Diagnosis:     [x] Altered GI Function    Nutrition Diagnosis is [x] ongoing  [ ] resolved [ ] not applicable     New Nutrition Diagnosis: [x] not applicable       Interventions:   Recommend continue current diet with supplements  [ ] Change Diet To:  [ ] Nutrition Supplement  [ ] Nutrition Support  [ ] Other:     Monitoring and Evaluation:   [ x PO intake [ x ] Tolerance to diet prescription [ x ] weights [ x ] labs[ x ] follow up per protocol  [x] other: honor food preferences as requested

## 2018-11-07 NOTE — PROGRESS NOTE ADULT - ASSESSMENT
·	ESRD on HD  ·	Cholecystitis, s/p Perc cholecystostomy  ·	Diabetes  ·	Hypertension    HD MWF. To continue HD TIW as scheduled. Fluid removal as tolerated by BP.   Dietary and PO fluid restriction. Epogen as needed for anemia. On IV abx.  Monitor BP trend. Titrate BP meds as needed. Salt restriction.   Monitor blood sugar levels. Insulin coverage as needed. D/c planning.

## 2018-11-07 NOTE — DISCHARGE NOTE ADULT - HOSPITAL COURSE
68F with PMH of CAD s/p stent (2007), ESRD on HD (MWF; missed today's session), DM type 2, and HTN presents with fever and admitted with severe sepsis secondary to acute choledocholithiasis.       : Septic shock.  Plan: 2/2 acute calculus cholecystitis with biliary dilatation with common bile duct stone.   Blood Cx + for ESBL. E.coli antibx switched to Ertapenem.  completed  iv abx in am 11/7 and can proceed d/c home.       : Cholangitis concurrent with and due to calculus of gallbladder.  Plan: Ac cholecystitis with gall stones, cholangitis and choledocholithiasis  S/P P/C cholecystoscopy and drainage   S/P MRCP no stone in CBD, FU GI,   improving in AST/ALT, c/w ursodiol.  fluid cx + for  E.coli switched to Ertapenem. ID  f/u  would need IR tube study follow up in 2 weeks   d/c home with homecare.       CAD (coronary artery disease).  Plan: s/p stent in 2007. trops are trending down at .308.  Afib with RVR. Received amio and digoxin .5mg IV. Rate improved.  C/W Asa and resumed plavix  Cardio recomm appreciated.        ESRD (end stage renal disease) on dialysis.  Plan: HD MWF.   Nephro f/u.       Hypertension.  Plan: BP stable,   C/w current meds.  BB, isordil and nifedipine.        Diabetes. Plan: BG improving at 130. Continue to monitor   -c/w lantus and low dose insulin sliding scale per ICU recs  - accuchecks  -HgA1c 9.7.      PE nad  chest s1m s2  lungs decrease air entry at the bases  abd:BS+   ext; 1+ pedla edema or caynosis     d/c planning wiht home with homecare 68F with PMH of CAD s/p stent (2007), ESRD on HD (MWF) DM type 2, and HTN presented with fever and admitted with severe sepsis secondary to acute choledocholithiasis.       : Septic shock.  Plan: 2/2 acute calculus cholecystitis with biliary dilatation with common bile duct stone.   Blood Cx + for ESBL. E.coli antibx switched to Ertapenem.  completed  iv abx in am 11/7 and can proceed d/c home.       : Cholangitis concurrent with and due to calculus of gallbladder.  Plan: Ac cholecystitis with gall stones, cholangitis and choledocholithiasis  S/P P/C cholecystoscopy and drainage   S/P MRCP no stone in CBD, FU GI,   improving in AST/ALT, c/w ursodiol.  fluid cx + for  E.coli switched to Ertapenem. ID  f/u  would need IR tube study follow up in 2 weeks   d/c home with homecare.       CAD (coronary artery disease).  Plan: s/p stent in 2007. trops are trending down at .308.  Afib with RVR. Received amio and digoxin .5mg IV. Rate improved.  C/W Asa and resumed plavix  Cardio recomm appreciated.        ESRD (end stage renal disease) on dialysis.  Plan: HD MWF.   Nephro f/u.       Hypertension.  Plan: BP stable,   C/w current meds.  BB, isordil and nifedipine.        Diabetes. Plan: BG improving at 130. Continue to monitor   -c/w lantus and low dose insulin sliding scale per ICU recs  - accuchecks  -HgA1c 9.7.      PE nad  chest s1m s2  lungs decrease air entry at the bases  abd:BS+   ext; 1+ pedla edema or caynosis     d/c planning wiht home with homecare Patient is a 68y old female pmhx CAD s/p stent (2007), ESRD on HD (M,W,F), DM, HTN, HLD who presented to the ED with abdominal pain, nausea, vomiting, fever, diarrhea, and vomiting, admitted with severe sepsis secondary to acute cholecystitis. In the ED patient was febrile at 102.9, tachycardiac at 104 and normotensive and labs significant for WBC 23.96 30% bands, elevated transaminitis, elevated total bilirubin and elevated lactate x2, elevated procalcitonin and elevated Cr 5.4. Urine analysis negative. CT chest/abdomen showed  acute calculus cholecystitis, Biliary dilatation. Chest X-ray showed increased congestions. Patient was in septic shock secondary to gram negative bacteremia. Patient given vancomycin and zosyn, tylenol and 2L IVF in the ED. Patient was in the ICU on peripheral vasopressors and on BIPAP, and a central line was placed, and patient was later stepped down to inpatient medicine floors. MRCP was done that did not show a stone in the common bile duct. A percutaneious cholecystostomy tube was placed. Patient had rapid Afib, received amiodarone and digoxin, converted back to sinus. Infectious disease consult followed patient throughout stay. Positive Blood cultures fo ESBL. Patient given IV antibiotics. Finished course of Invanz.  Nephrology consult (Dr. Albarran) followed patient, patient received hemodialysis MWF. GI consult (Dr. Camacho) followed patient throughout stay. Surgery consult (Dr. Campos) followed patient throughout stay.  On 11/12 percutaneous cholecystostomy tube was removed.            Stable for discharge to [facility]. Patient is a 68y old female pmhx CAD s/p stent (2007), ESRD on HD (M,W,F), DM, HTN, HLD who presented to the ED with abdominal pain, nausea, vomiting, fever, diarrhea, and vomiting, admitted with severe sepsis secondary to acute cholecystitis. In the ED patient was febrile at 102.9, tachycardiac at 104 and normotensive and labs significant for WBC 23.96 30% bands, elevated transaminitis, elevated total bilirubin and elevated lactate x2, elevated procalcitonin and elevated Cr 5.4. Urine analysis negative. CT chest/abdomen showed  acute calculus cholecystitis, Biliary dilatation. Chest X-ray showed increased congestions. Patient was in septic shock secondary to gram negative bacteremia. Patient given vancomycin and zosyn, tylenol and 2L IVF in the ED. Patient was in the ICU on peripheral vasopressors and on BIPAP, and a central line was placed, and patient was later stepped down to inpatient medicine floors. MRCP was done that did not show a stone in the common bile duct. A percutaneious cholecystostomy tube was placed. Patient had rapid Afib, received amiodarone and digoxin, converted back to sinus. Infectious disease consult followed patient throughout stay. Positive Blood cultures fo ESBL. Patient given IV antibiotics. Finished course of Invanz.  Nephrology consult (Dr. Albarran) followed patient, patient received hemodialysis MWF. GI consult (Dr. Camacho) followed patient throughout stay. Surgery consult (Dr. Campos) followed patient throughout stay.  On 11/12 percutaneous cholecystostomy tube was removed.    Patient is stable for discharge to [facility].   Vital Signs Last 24 Hrs  T(C): 36.5 (13 Nov 2018 04:55), Max: 36.7 (12 Nov 2018 13:25)  T(F): 97.7 (13 Nov 2018 04:55), Max: 98 (12 Nov 2018 13:25)  HR: 69 (13 Nov 2018 04:55) (60 - 69)  BP: 166/75 (13 Nov 2018 04:55) (131/70 - 166/75)  BP(mean): --  RR: 18 (13 Nov 2018 04:55) (16 - 18)  SpO2: 96% (13 Nov 2018 04:55) (94% - 98%)    PHYSICAL EXAM:  GENERAL: NAD, well-groomed, well-developed  HEAD:  Atraumatic, Normocephalic  NERVOUS SYSTEM:  Alert & Oriented X3, Good concentration  CHEST/LUNG: Clear to percussion bilaterally; No rales, rhonchi, wheezing, or rubs  HEART: Regular rate and rhythm; No murmurs, rubs, or gallops  ABDOMEN: Soft, +tenderness of RLQ, Nondistended; Bowel sounds present   EXTREMITIES: No clubbing, cyanosis, or edema Patient is a 68y old female pmhx CAD s/p stent (2007), ESRD on HD (M,W,F), DM, HTN, HLD who presented to the ED with abdominal pain, nausea, vomiting, fever, diarrhea, and vomiting, admitted with severe sepsis secondary to acute cholecystitis. In the ED patient was febrile at 102.9, tachycardiac at 104 and normotensive and labs significant for WBC 23.96 30% bands, elevated transaminitis, elevated total bilirubin and elevated lactate x2, elevated procalcitonin and elevated Cr 5.4. Urine analysis negative. CT chest/abdomen showed  acute calculus cholecystitis, Biliary dilatation. Chest X-ray showed increased congestions. Patient was in septic shock secondary to gram negative bacteremia. Patient given vancomycin and zosyn, tylenol and 2L IVF in the ED. Patient was in the ICU on peripheral vasopressors and on BIPAP, and a central line was placed, and patient was later stepped down to inpatient medicine floors. MRCP was done that did not show a stone in the common bile duct. A percutaneious cholecystostomy tube was placed. Patient had rapid Afib, received amiodarone and digoxin, converted back to sinus. Infectious disease consult followed patient throughout stay. Positive Blood cultures fo ESBL. Patient given IV antibiotics. Finished course of Invanz.  Nephrology consult (Dr. Albarran) followed patient, patient received hemodialysis MWF. GI consult (Dr. Camacho) followed patient throughout stay. Surgery consult (Dr. Campos) followed patient throughout stay.  On 11/12 percutaneous cholecystostomy tube was removed. A repeat CT abdomen was done that showed a stone in the common bile duct, and an ERCP was done, but the stone was unable to be removed.        Patient is stable for discharge to [facility] with close follow-up with GI, and repeat liver enzymes in 2-3 days. As per GI if patient becomes unstable after discharge then it is likely that patient may need Percutaneous Transhepatic Cholangiography.       Vital Signs Last 24 Hrs  T(C): 33.7 (15 Nov 2018 09:40), Max: 36.9 (14 Nov 2018 13:25)  T(F): 92.6 (15 Nov 2018 09:40), Max: 98.4 (14 Nov 2018 13:25)  HR: 61 (15 Nov 2018 09:40) (54 - 73)  BP: 154/71 (15 Nov 2018 09:40) (133/66 - 176/74)  BP(mean): --  RR: 18 (15 Nov 2018 09:40) (16 - 20)  SpO2: 96% (15 Nov 2018 09:40) (94% - 99%)    PHYSICAL EXAM:  GENERAL: NAD, well-groomed, well-developed  HEAD:  Atraumatic, Normocephalic  NERVOUS SYSTEM:  Alert & Oriented X3, Good concentration  CHEST/LUNG: Clear to percussion bilaterally; No rales, rhonchi, wheezing, or rubs  HEART: Regular rate and rhythm; No murmurs, rubs, or gallops  ABDOMEN: Soft, nontender, Nondistended; Bowel sounds present   EXTREMITIES: No clubbing, cyanosis, or edema Patient is a 68y old female pmhx CAD s/p stent (2007), ESRD on HD (M,W,F), DM, HTN, HLD who presented to the ED with abdominal pain, nausea, vomiting, fever, diarrhea, and vomiting, admitted with severe sepsis secondary to acute cholecystitis. In the ED patient was febrile at 102.9, tachycardiac at 104 and normotensive and labs significant for WBC 23.96 30% bands, elevated transaminitis, elevated total bilirubin and elevated lactate x2, elevated procalcitonin and elevated Cr 5.4. Urine analysis negative. CT chest/abdomen showed  acute calculus cholecystitis, Biliary dilatation. Chest X-ray showed increased congestions. Patient was in septic shock secondary to gram negative bacteremia. Patient given vancomycin and zosyn, tylenol and 2L IVF in the ED. Patient was in the ICU on peripheral vasopressors and on BIPAP, and a central line was placed, and patient was later stepped down to inpatient medicine floors. MRCP was done that did not show a stone in the common bile duct. A percutaneious cholecystostomy tube was placed. Patient had rapid Afib, received amiodarone and digoxin, converted back to sinus. Infectious disease consult followed patient throughout stay. Positive Blood cultures fo ESBL. Patient given IV antibiotics. Finished course of Invanz.  Nephrology consult (Dr. Albarran) followed patient, patient received hemodialysis MW. GI consult (Dr. Camacho) followed patient throughout stay. Surgery consult (Dr. Campos) followed patient throughout stay.  On 11/12 percutaneous cholecystostomy tube was removed. A repeat CT abdomen was done that showed a stone in the common bile duct, and an ERCP was done, but the stone was unable to be removed.  Patient developed abdominal pain overnight with vomiting overnight, with associated shortness of breath.  Alk phos 466  ALT 50 T bili 2.2 (direct 1.8) lipase 437 WBC 21.92 Procalcitonin 9.21. Patient transferred to [facility], accepting physician Dr. Michael Carbone. Patient is a 68y old female pmhx CAD s/p stent (2007), ESRD on HD (M,W,F), DM, HTN, HLD who presented to the ED with abdominal pain, nausea, vomiting, fever, diarrhea, and vomiting, admitted with severe sepsis secondary to acute cholecystitis. In the ED patient was febrile at 102.9, tachycardiac at 104 and normotensive and labs significant for WBC 23.96 30% bands, elevated transaminitis, elevated total bilirubin and elevated lactate x2, elevated procalcitonin and elevated Cr 5.4. Urine analysis negative. CT chest/abdomen showed  acute calculus cholecystitis, Biliary dilatation. Chest X-ray showed increased congestions. Patient was in septic shock secondary to gram negative bacteremia. Patient given vancomycin and zosyn, tylenol and 2L IVF in the ED. Patient was in the ICU on peripheral vasopressors and on BIPAP, and a central line was placed, and patient was later stepped down to inpatient medicine floors. MRCP was done that did not show a stone in the common bile duct. A percutaneious cholecystostomy tube was placed. Patient had rapid Afib, received amiodarone and digoxin, converted back to sinus. Infectious disease consult followed patient throughout stay. Positive Blood cultures fo ESBL. Patient given IV antibiotics. Finished course of Invanz.  Nephrology consult (Dr. Albarran) followed patient, patient received hemodialysis MW. GI consult (Dr. Camacho) followed patient throughout stay. Surgery consult (Dr. Campos) followed patient throughout stay.  On 11/12 percutaneous cholecystostomy tube was removed. A repeat CT abdomen was done that showed a stone in the common bile duct, and an ERCP was done, but the stone was unable to be removed.  Patient developed abdominal pain overnight with vomiting overnight, with associated shortness of breath.  Alk phos 466  ALT 50 T bili 2.2 (direct 1.8) lipase 437 WBC 21.92 Procalcitonin 9.21. Patient transferred to [facility], accepting physician Dr. Michael Carbone.     Patient seen and examined on day of transfer:    Vital Signs Last 24 Hrs  T(C): 36.9 (16 Nov 2018 05:52), Max: 37.7 (16 Nov 2018 00:47)  T(F): 98.4 (16 Nov 2018 05:52), Max: 99.9 (16 Nov 2018 00:47)  HR: 85 (16 Nov 2018 05:52) (66 - 89)  BP: 149/77 (16 Nov 2018 05:52) (130/68 - 176/74)  BP(mean): --  RR: 18 (16 Nov 2018 05:52) (18 - 18)  SpO2: 92% (16 Nov 2018 05:52) (92% - 100%)    PHYSICAL EXAM:  GENERAL: NAD, well-groomed, well-developed   HEAD:  Atraumatic, Normocephalic  NERVOUS SYSTEM:  Alert & Oriented, Good concentration    CHEST/LUNG: + minimal crackles   HEART: Regular rate and rhythm; No murmurs, rubs, or gallops  ABDOMEN: + BS, distended, tender to palpation of mid abdomen and RUQ   EXTREMITIES: No clubbing, cyanosis, or edema Patient is a 68y old female pmhx CAD s/p stent (2007), ESRD on HD (M,W,F), DM, HTN, HLD who presented to the ED with abdominal pain, nausea, vomiting, fever, diarrhea, and vomiting, admitted with severe sepsis secondary to acute cholecystitis. In the ED patient was febrile at 102.9, tachycardiac at 104 and normotensive and labs significant for WBC 23.96 30% bands, elevated transaminitis, elevated total bilirubin and elevated lactate x2, elevated procalcitonin and elevated Cr 5.4. Urine analysis negative. CT chest/abdomen showed  acute calculus cholecystitis, Biliary dilatation. Chest X-ray showed increased congestions. Patient was in septic shock secondary to gram negative bacteremia. Patient given vancomycin and zosyn, tylenol and 2L IVF in the ED. Patient was in the ICU on peripheral vasopressors and on BIPAP, and a central line was placed, and patient was later stepped down to inpatient medicine floors. MRCP was done that did not show a stone in the common bile duct. A percutaneious cholecystostomy tube was placed. Patient had rapid Afib, received amiodarone and digoxin, converted back to sinus. Infectious disease consult followed patient throughout stay. Positive Blood cultures fo ESBL. Patient given IV antibiotics. Finished course of Invanz.  Nephrology consult (Dr. Albarran) followed patient, patient received hemodialysis MW. GI consult (Dr. Camacho) followed patient throughout stay. Surgery consult (Dr. Campos) followed patient throughout stay.  On 11/12 percutaneous cholecystostomy tube was removed. A repeat CT abdomen was done that showed a stone in the common bile duct, and an ERCP was done, but the stone was unable to be removed.  Patient developed abdominal pain overnight with vomiting overnight, with associated shortness of breath.  Alk phos 466  ALT 50 T bili 2.2 (direct 1.8) lipase 437 WBC 21.92 Procalcitonin 9.21. Patient transferred to [facility], accepting physician Dr. Michael Carbone.     Patient seen and examined on day of transfer:    Vital Signs Last 24 Hrs  T(C): 36.9 (16 Nov 2018 05:52), Max: 37.7 (16 Nov 2018 00:47)  T(F): 98.4 (16 Nov 2018 05:52), Max: 99.9 (16 Nov 2018 00:47)  HR: 85 (16 Nov 2018 05:52) (66 - 89)  BP: 149/77 (16 Nov 2018 05:52) (130/68 - 176/74)  BP(mean): --  RR: 18 (16 Nov 2018 05:52) (18 - 18)  SpO2: 92% (16 Nov 2018 05:52) (92% - 100%)    PHYSICAL EXAM:  GENERAL: NAD, well-groomed, well-developed   HEAD:  Atraumatic, Normocephalic  NERVOUS SYSTEM:  Alert & Oriented, Good concentration    CHEST/LUNG: + minimal crackles   HEART: Regular rate and rhythm; No murmurs, rubs, or gallops  ABDOMEN: + BS, distended, tender to palpation of mid abdomen and RUQ   EXTREMITIES: No clubbing, cyanosis, or edema    Pt is stable for transfer to [facility], prognosis guarded, family aware and along with pt in agreement for higher level of care.     Time spent: 75 minutes

## 2018-11-07 NOTE — DISCHARGE NOTE ADULT - CARE PLAN
Principal Discharge DX:	Cholangitis concurrent with and due to calculus of gallbladder  Goal:	secondary to sepsis with ecoli/esbl, completed iv abx therapy  Assessment and plan of treatment:	would need outpt follow   needs IR for the tube study in 2 weeks  Secondary Diagnosis:	CAD (coronary artery disease)  Goal:	resume home meds  Secondary Diagnosis:	Diabetes  Goal:	lantus  Secondary Diagnosis:	ESRD (end stage renal disease) on dialysis  Goal:	HD monday, wensday, friday  Secondary Diagnosis:	Hypertension Principal Discharge DX:	Cholangitis concurrent with and due to calculus of gallbladder  Goal:	Resolution  Assessment and plan of treatment:	During your hospital stay you were treated with IV antibiotics and completed your course. A cholecystostomy tube was placed and then removed before discharged.  Secondary Diagnosis:	CAD (coronary artery disease)  Assessment and plan of treatment:	Continue with home medications aspirin, Plavix, Imdur, metoprolol  Follow up with your PCP and your cardiologist  Secondary Diagnosis:	Diabetes  Assessment and plan of treatment:	Continue with home medication Lantus  Secondary Diagnosis:	ESRD (end stage renal disease) on dialysis  Assessment and plan of treatment:	Continue with hemodialysis M, W, F  Secondary Diagnosis:	Hypertension  Assessment and plan of treatment:	Continue with home medication of Metroprolol, nifedipine Principal Discharge DX:	Cholangitis concurrent with and due to calculus of gallbladder  Goal:	Resolution  Assessment and plan of treatment:	During your hospital stay you were treated with IV antibiotics and completed your course. A cholecystostomy tube was placed and then removed before discharged.   -A repeat ERCP was done that showed a stone that was unable to be removed. Your Liver enzymes have been stable.    -Please do blood work to check your liver enzymes (LFTs) 3-4 days after discharge and follow-up with your Gastroenterologist.  Secondary Diagnosis:	CAD (coronary artery disease)  Assessment and plan of treatment:	Continue with home medications aspirin, Plavix, Imdur, metoprolol  Follow up with your PCP and your cardiologist  Secondary Diagnosis:	Diabetes  Assessment and plan of treatment:	Continue with home medication Lantus  Secondary Diagnosis:	ESRD (end stage renal disease) on dialysis  Assessment and plan of treatment:	Continue with hemodialysis M, W, F  Secondary Diagnosis:	Hypertension  Assessment and plan of treatment:	Continue with home medication of Metroprolol, nifedipine Principal Discharge DX:	Cholangitis concurrent with and due to calculus of gallbladder  Goal:	Resolution  Assessment and plan of treatment:	During your hospital stay you were treated with IV antibiotics and completed your course. A cholecystostomy tube was placed and then removed before discharged.   -A repeat ERCP was done that showed a stone that was unable to be removed. Your Liver enzymes have been stable.    -Please do blood work to check your liver enzymes (LFTs) 3-4 days after discharge and follow-up with your Gastroenterologist within 3-4 days.  Secondary Diagnosis:	CAD (coronary artery disease)  Assessment and plan of treatment:	Continue with home medications aspirin, Plavix, Imdur, metoprolol  Follow up with your PCP and your cardiologist (Dr. Yoel Mosher)  Secondary Diagnosis:	Diabetes  Assessment and plan of treatment:	-Continue with home medication Lantus  -Follow-up with your PCP 3-4 days after discharge.  Secondary Diagnosis:	ESRD (end stage renal disease) on dialysis  Assessment and plan of treatment:	-Continue with hemodialysis M, W, F  -Follow-up with your Renal doctor (Dr. Albarran) within 1 week of discharge.  Secondary Diagnosis:	Hypertension  Assessment and plan of treatment:	-Continue with home medication of Metroprolol, nifedipine.  -Follow-up with your PCP 3-4 days after discharge. Principal Discharge DX:	Cholangitis concurrent with and due to calculus of gallbladder  Goal:	Resolution  Assessment and plan of treatment:	During your hospital stay you were treated with IV antibiotics and completed your course. A cholecystostomy tube was placed and then removed before discharged.   -A repeat ERCP was done that showed a stone that was unable to be removed. Your Liver enzymes on discharge were stable.    -Please do blood work to check your liver enzymes (LFTs) 3-4 days after discharge and follow-up with your Gastroenterologist within 3-4 days.  -Follow-up with your surgeon within 1 week of discharge.   -You will need to go to [facility] to regain strength, and receive dialysis.  Secondary Diagnosis:	CAD (coronary artery disease)  Assessment and plan of treatment:	Continue with home medications aspirin, Plavix, Imdur, metoprolol  Follow up with your PCP and your cardiologist (Dr. Yoel Mosher)  Secondary Diagnosis:	Diabetes  Assessment and plan of treatment:	-Continue with home medication Lantus  -Follow-up with your PCP 3-4 days after discharge.  Secondary Diagnosis:	ESRD (end stage renal disease) on dialysis  Assessment and plan of treatment:	-Continue with hemodialysis M, W, F  -Follow-up with your Renal doctor (Dr. Albarran) within 1 week of discharge.  Secondary Diagnosis:	Hypertension  Assessment and plan of treatment:	-Continue with home medication of Metroprolol, nifedipine.  -Follow-up with your PCP 3-4 days after discharge. Principal Discharge DX:	Cholangitis concurrent with and due to calculus of gallbladder  Goal:	Resolution  Assessment and plan of treatment:	During your hospital stay you were treated with IV antibiotics and completed your course. A cholecystostomy tube was placed and then removed before discharged.   -A repeat ERCP was done that showed a stone that was unable to be removed. You developed abdominal pain with vomiting and found to have elevated liver enzymes, and markers of acute inflammation. You were transferred to [facility]  -Please do blood work to check your liver enzymes (LFTs) 3-4 days after discharge and follow-up with your Gastroenterologist within 3-4 days.  -Follow-up with your surgeon within 1 week of discharge.  Secondary Diagnosis:	CAD (coronary artery disease)  Assessment and plan of treatment:	Continue with home medications aspirin, Plavix, Imdur, metoprolol  Follow up with your PCP and your cardiologist (Dr. Yoel Mosher)  Secondary Diagnosis:	Diabetes  Assessment and plan of treatment:	-Continue with home medication Lantus  -Follow-up with your PCP 3-4 days after discharge.  Secondary Diagnosis:	ESRD (end stage renal disease) on dialysis  Assessment and plan of treatment:	-Continue with hemodialysis M, W, F  -Follow-up with your Renal doctor (Dr. Albarran) within 1 week of discharge.  Secondary Diagnosis:	Hypertension  Assessment and plan of treatment:	-Continue with home medication of Metroprolol, nifedipine.  -Follow-up with your PCP 3-4 days after discharge.

## 2018-11-07 NOTE — PROGRESS NOTE ADULT - ASSESSMENT
s/p perc cholecystostomy tube for cholecystitis.  Doing well overall    cleared for d/c from gen surg standpoint

## 2018-11-08 LAB
CULTURE RESULTS: SIGNIFICANT CHANGE UP
SPECIMEN SOURCE: SIGNIFICANT CHANGE UP

## 2018-11-08 PROCEDURE — 99232 SBSQ HOSP IP/OBS MODERATE 35: CPT

## 2018-11-08 RX ADMIN — URSODIOL 300 MILLIGRAM(S): 250 TABLET, FILM COATED ORAL at 05:10

## 2018-11-08 RX ADMIN — CHLORHEXIDINE GLUCONATE 1 APPLICATION(S): 213 SOLUTION TOPICAL at 12:10

## 2018-11-08 RX ADMIN — Medication 1 TABLET(S): at 11:57

## 2018-11-08 RX ADMIN — Medication 2: at 21:11

## 2018-11-08 RX ADMIN — ISOSORBIDE MONONITRATE 60 MILLIGRAM(S): 60 TABLET, EXTENDED RELEASE ORAL at 05:11

## 2018-11-08 RX ADMIN — Medication 325 MILLIGRAM(S): at 05:10

## 2018-11-08 RX ADMIN — ATORVASTATIN CALCIUM 40 MILLIGRAM(S): 80 TABLET, FILM COATED ORAL at 21:07

## 2018-11-08 RX ADMIN — Medication 100 MILLIGRAM(S): at 05:12

## 2018-11-08 RX ADMIN — PANTOPRAZOLE SODIUM 40 MILLIGRAM(S): 20 TABLET, DELAYED RELEASE ORAL at 05:44

## 2018-11-08 RX ADMIN — Medication 81 MILLIGRAM(S): at 11:56

## 2018-11-08 RX ADMIN — Medication 2: at 08:15

## 2018-11-08 RX ADMIN — Medication 50 MILLIGRAM(S): at 17:46

## 2018-11-08 RX ADMIN — HEPARIN SODIUM 5000 UNIT(S): 5000 INJECTION INTRAVENOUS; SUBCUTANEOUS at 05:12

## 2018-11-08 RX ADMIN — Medication 325 MILLIGRAM(S): at 21:07

## 2018-11-08 RX ADMIN — Medication 60 MILLIGRAM(S): at 05:10

## 2018-11-08 RX ADMIN — Medication 4: at 17:46

## 2018-11-08 RX ADMIN — HEPARIN SODIUM 5000 UNIT(S): 5000 INJECTION INTRAVENOUS; SUBCUTANEOUS at 21:06

## 2018-11-08 RX ADMIN — GABAPENTIN 300 MILLIGRAM(S): 400 CAPSULE ORAL at 11:56

## 2018-11-08 RX ADMIN — ISOSORBIDE MONONITRATE 30 MILLIGRAM(S): 60 TABLET, EXTENDED RELEASE ORAL at 17:46

## 2018-11-08 RX ADMIN — CLOPIDOGREL BISULFATE 75 MILLIGRAM(S): 75 TABLET, FILM COATED ORAL at 11:56

## 2018-11-08 RX ADMIN — Medication 100 MILLIGRAM(S): at 21:06

## 2018-11-08 RX ADMIN — Medication 50 MILLIGRAM(S): at 05:11

## 2018-11-08 RX ADMIN — Medication 6: at 11:57

## 2018-11-08 NOTE — PROGRESS NOTE ADULT - SUBJECTIVE AND OBJECTIVE BOX
Patient is a 68y old  Female who presents with a chief complaint of Sepsis (31 Oct 2018 07:06)  Patient seen in follow up for ESRD on HD. s/p Perc Cholecystostomy. + blood cultures ESBL     No new complaints.     PAST MEDICAL HISTORY:  ESRD (end stage renal disease) on dialysis  CAD (coronary artery disease)  Diabetes  CRF (chronic renal failure)  Hypertension  Pneumonia  Myocardial infarction  Hypertension  Diabetes       MEDICATIONS  (STANDING):  aspirin enteric coated 81 milliGRAM(s) Oral daily  atorvastatin 40 milliGRAM(s) Oral at bedtime  calcium carbonate 1250 mG  + Vitamin D (OsCal 500 + D) 1 Tablet(s) Oral daily  chlorhexidine 2% Cloths 1 Application(s) Topical daily  clopidogrel Tablet 75 milliGRAM(s) Oral daily  dextrose 5%. 1000 milliLiter(s) (50 mL/Hr) IV Continuous <Continuous>  dextrose 50% Injectable 12.5 Gram(s) IV Push once  dextrose 50% Injectable 25 Gram(s) IV Push once  dextrose 50% Injectable 25 Gram(s) IV Push once  docusate sodium 100 milliGRAM(s) Oral three times a day  ferrous    sulfate 325 milliGRAM(s) Oral three times a day  gabapentin 300 milliGRAM(s) Oral daily  heparin  Injectable 5000 Unit(s) SubCutaneous every 8 hours  insulin lispro (HumaLOG) corrective regimen sliding scale   SubCutaneous Before meals and at bedtime  isosorbide   mononitrate ER Tablet (IMDUR) 30 milliGRAM(s) Oral every 24 hours  isosorbide   mononitrate ER Tablet (IMDUR) 60 milliGRAM(s) Oral <User Schedule>  metoprolol tartrate 50 milliGRAM(s) Oral two times a day  NIFEdipine XL 60 milliGRAM(s) Oral daily  pantoprazole    Tablet 40 milliGRAM(s) Oral before breakfast  polyethylene glycol 3350 17 Gram(s) Oral daily  ursodiol Capsule 300 milliGRAM(s) Oral every 12 hours    MEDICATIONS  (PRN):  acetaminophen   Tablet .. 650 milliGRAM(s) Oral every 6 hours PRN Mild Pain (1 - 3)  dextrose 40% Gel 15 Gram(s) Oral once PRN Blood Glucose LESS THAN 70 milliGRAM(s)/deciliter  glucagon  Injectable 1 milliGRAM(s) IntraMuscular once PRN Glucose LESS THAN 70 milligrams/deciliter  simethicone 80 milliGRAM(s) Chew every 6 hours PRN abdominal bloating    T(C): 37.1 (11-08-18 @ 04:37), Max: 37.1 (11-08-18 @ 04:37)  HR: 67 (11-08-18 @ 04:37) (64 - 74)  BP: 144/68 (11-08-18 @ 04:37) (106/63 - 166/65)  RR: 17 (11-08-18 @ 04:37)  SpO2: 95% (11-08-18 @ 04:37)  Wt(kg): --  I&O's Detail    07 Nov 2018 07:01  -  08 Nov 2018 07:00  --------------------------------------------------------  IN:  Total IN: 0 mL    OUT:  Total OUT: 0 mL    Total NET: 0 mL          PHYSICAL EXAM:  General: NAD, Rt chest dialysis catheter  Respiratory: b/l air entry  Cardiovascular: S1 S2  Gastrointestinal: soft, cholecystostomy tube  Extremities:  no edema               LABORATORY:              Hemoglobin: 8.4 g/dL (11-06 @ 09:53)  Hemoglobin: 8.7 g/dL (11-06 @ 09:25)    Creatinine, Serum 7.90 (11-06 @ 09:53)  Creatinine, Serum 7.60 (11-06 @ 09:25)

## 2018-11-08 NOTE — PROGRESS NOTE ADULT - ASSESSMENT
67 yo s/p percutaneous cholecystostomy tube, for acute cholecystitis     cont perc kevin tbe     cleared for d/c

## 2018-11-08 NOTE — PROGRESS NOTE ADULT - PROBLEM SELECTOR PLAN 3
no new labs  likely multifactorial, sepsis, acute illness, renal insufficiency  prior hgb trend noted  no overt s/s gib  trend h/h, transfuse prn  consider iron studies  ppi qd  if hgb continues to downtrend check fobt  will follow

## 2018-11-08 NOTE — PROGRESS NOTE ADULT - PROBLEM SELECTOR PLAN 1
clinically improved  s/p perc c-tube  repeat blood/bile fluid cxs ngtd  sp abx  as per surgery, cleared for dc  id following  monitor abd exam  diet as tolerated  dc planning per primary team

## 2018-11-08 NOTE — PROGRESS NOTE ADULT - SUBJECTIVE AND OBJECTIVE BOX
INTERVAL HPI/OVERNIGHT EVENTS:  pt seen and examined  denies n/v/abd pain  per overnight rn no acute gi issues, no s/s overt gib, awaiting dc  afebrile overnight no new labs    MEDICATIONS  (STANDING):  aspirin enteric coated 81 milliGRAM(s) Oral daily  atorvastatin 40 milliGRAM(s) Oral at bedtime  calcium carbonate 1250 mG  + Vitamin D (OsCal 500 + D) 1 Tablet(s) Oral daily  chlorhexidine 2% Cloths 1 Application(s) Topical daily  clopidogrel Tablet 75 milliGRAM(s) Oral daily  dextrose 5%. 1000 milliLiter(s) (50 mL/Hr) IV Continuous <Continuous>  dextrose 50% Injectable 12.5 Gram(s) IV Push once  dextrose 50% Injectable 25 Gram(s) IV Push once  dextrose 50% Injectable 25 Gram(s) IV Push once  docusate sodium 100 milliGRAM(s) Oral three times a day  ferrous    sulfate 325 milliGRAM(s) Oral three times a day  gabapentin 300 milliGRAM(s) Oral daily  heparin  Injectable 5000 Unit(s) SubCutaneous every 8 hours  insulin lispro (HumaLOG) corrective regimen sliding scale   SubCutaneous Before meals and at bedtime  isosorbide   mononitrate ER Tablet (IMDUR) 30 milliGRAM(s) Oral every 24 hours  isosorbide   mononitrate ER Tablet (IMDUR) 60 milliGRAM(s) Oral <User Schedule>  metoprolol tartrate 50 milliGRAM(s) Oral two times a day  NIFEdipine XL 60 milliGRAM(s) Oral daily  pantoprazole    Tablet 40 milliGRAM(s) Oral before breakfast  polyethylene glycol 3350 17 Gram(s) Oral daily  ursodiol Capsule 300 milliGRAM(s) Oral every 12 hours    MEDICATIONS  (PRN):  acetaminophen   Tablet .. 650 milliGRAM(s) Oral every 6 hours PRN Mild Pain (1 - 3)  dextrose 40% Gel 15 Gram(s) Oral once PRN Blood Glucose LESS THAN 70 milliGRAM(s)/deciliter  glucagon  Injectable 1 milliGRAM(s) IntraMuscular once PRN Glucose LESS THAN 70 milligrams/deciliter  simethicone 80 milliGRAM(s) Chew every 6 hours PRN abdominal bloating      Allergies    No Known Drug Allergies  Purell (Rash)    Intolerances        Review of Systems:    General:  No wt loss, fevers, chills, night sweats, fatigue   Eyes:  Good vision, no reported pain  ENT:  No sore throat, pain, runny nose, dysphagia  CV:  No pain, palpitations, hypo/hypertension  Resp:  No dyspnea, cough, tachypnea, wheezing  GI:  No pain, No nausea, No vomiting, No diarrhea, No constipation, No weight loss, No fever, No pruritis, No rectal bleeding, No melena, No dysphagia  :  No pain, bleeding, incontinence, nocturia  Muscle:  No pain, weakness  Neuro:  No weakness, tingling, memory problems  Psych:  No fatigue, insomnia, mood problems, depression  Endocrine:  No polyuria, polydypsia, cold/heat intolerance  Heme:  No petechiae, ecchymosis, easy bruisability  Skin:  No rash, tattoos, scars, edema      Vital Signs Last 24 Hrs  T(C): 37.1 (08 Nov 2018 04:37), Max: 37.1 (08 Nov 2018 04:37)  T(F): 98.7 (08 Nov 2018 04:37), Max: 98.7 (08 Nov 2018 04:37)  HR: 67 (08 Nov 2018 04:37) (66 - 69)  BP: 144/68 (08 Nov 2018 04:37) (106/63 - 166/65)  BP(mean): --  RR: 17 (08 Nov 2018 04:37) (16 - 18)  SpO2: 95% (08 Nov 2018 04:37) (92% - 96%)    PHYSICAL EXAM:  Constitutional: NAD, lying in bed  HEENT: ncat  Neck: No LAD  Respiratory: dec bs  Cardiovascular: S1 and S2, RRR  Gastrointestinal: soft nt mild dt perc kevin tube in place w minimal output  Extremities: no edema  Vascular: 2+ peripheral pulses  Neurological: awake alert  Skin: No rashes    LABS:                RADIOLOGY & ADDITIONAL TESTS:

## 2018-11-08 NOTE — PROGRESS NOTE ADULT - SUBJECTIVE AND OBJECTIVE BOX
Patient is a 68y old  Female who presents with a chief complaint of Sepsis (08 Nov 2018 12:53)        HPI:  Hx obtained from daughter  Patient is a 68y old female pmhx CAD s/p stent (2007), ESRD on HD (MWF; missed today's session), DM, HTN, HLD brought in with complaints of constant "hard pressure like" abdominal pain, nausea, vomiting, fever  for 2 days duration. Watery nonbloody nonfoul smelling diarrhea x 7 yesterday and 1 episode this am and NBNB vomiting x 7-8 yesterday and once today. For the past couple weeks pt has been having pain after meals, so has had a decreased PO intake. Did not trying anything for the pain and has never experienced this in the past. Endorses generalized weakness, fevers, chills. Denies recent abx usage, sick contacts, bloody stool, CP, SOB, dysuria. Pt has dialysis catheter changed 2 days ago back after it was accidently pulled out. Last dialysis session was Friday.     Upon arrival to ED patient febrile at 102.9, tachycardiac at 104 and normotensive and labs significant for WBC 23.96 30% bands elevated transaminitis, elevated tbili and elevated lactate x2, Cr 5.4.  procal elevated. UA neg Since presentation BP steadily dropping. BP dropping originally  now with SBP 98. EKG: sinus tach  CT chest abd pelvis: acute cholecystitis  RVP:neg  CXR: increased congestions  Patient given vancomycin and zosyn, tylenol and 2L IVF. (29 Oct 2018 18:49)      SUBJECTIVE & OBJECTIVE: Pt seen and examined at bedside, no acute complaints    PHYSICAL EXAM:  T(C): 36.6 (11-08-18 @ 13:37), Max: 37.1 (11-08-18 @ 04:37)  HR: 66 (11-08-18 @ 13:37) (66 - 69)  BP: 151/66 (11-08-18 @ 13:37) (106/63 - 166/65)  RR: 18 (11-08-18 @ 13:37) (16 - 18)  SpO2: 97% (11-08-18 @ 13:37) (92% - 97%)  Wt(kg): --   GENERAL: NAD, well-groomed, well-developed  HEAD:  Atraumatic, Normocephalic  EYES: EOMI, PERRLA, conjunctiva and sclera clear  ENMT: Moist mucous membranes  NECK: Supple, No JVD  NERVOUS SYSTEM:  Alert & Oriented X3, Motor Strength 5/5 B/L upper and lower extremities; DTRs 2+ intact and symmetric  CHEST/LUNG: Clear to auscultation bilaterally; No rales, rhonchi, wheezing, or rubs  HEART: Regular rate and rhythm; No murmurs, rubs, or gallops  ABDOMEN: Soft, Nontender, Nondistended; Bowel sounds present  EXTREMITIES:  2+ Peripheral Pulses, No clubbing, cyanosis, or edema        MEDICATIONS  (STANDING):  aspirin enteric coated 81 milliGRAM(s) Oral daily  atorvastatin 40 milliGRAM(s) Oral at bedtime  calcium carbonate 1250 mG  + Vitamin D (OsCal 500 + D) 1 Tablet(s) Oral daily  chlorhexidine 2% Cloths 1 Application(s) Topical daily  clopidogrel Tablet 75 milliGRAM(s) Oral daily  dextrose 5%. 1000 milliLiter(s) (50 mL/Hr) IV Continuous <Continuous>  dextrose 50% Injectable 12.5 Gram(s) IV Push once  dextrose 50% Injectable 25 Gram(s) IV Push once  dextrose 50% Injectable 25 Gram(s) IV Push once  docusate sodium 100 milliGRAM(s) Oral three times a day  ferrous    sulfate 325 milliGRAM(s) Oral three times a day  gabapentin 300 milliGRAM(s) Oral daily  heparin  Injectable 5000 Unit(s) SubCutaneous every 8 hours  insulin lispro (HumaLOG) corrective regimen sliding scale   SubCutaneous Before meals and at bedtime  isosorbide   mononitrate ER Tablet (IMDUR) 30 milliGRAM(s) Oral every 24 hours  isosorbide   mononitrate ER Tablet (IMDUR) 60 milliGRAM(s) Oral <User Schedule>  metoprolol tartrate 50 milliGRAM(s) Oral two times a day  NIFEdipine XL 60 milliGRAM(s) Oral daily  pantoprazole    Tablet 40 milliGRAM(s) Oral before breakfast  polyethylene glycol 3350 17 Gram(s) Oral daily  ursodiol Capsule 300 milliGRAM(s) Oral every 12 hours    MEDICATIONS  (PRN):  acetaminophen   Tablet .. 650 milliGRAM(s) Oral every 6 hours PRN Mild Pain (1 - 3)  dextrose 40% Gel 15 Gram(s) Oral once PRN Blood Glucose LESS THAN 70 milliGRAM(s)/deciliter  glucagon  Injectable 1 milliGRAM(s) IntraMuscular once PRN Glucose LESS THAN 70 milligrams/deciliter  simethicone 80 milliGRAM(s) Chew every 6 hours PRN abdominal bloating      LABS:                CAPILLARY BLOOD GLUCOSE      POCT Blood Glucose.: 282 mg/dL (08 Nov 2018 11:47)  POCT Blood Glucose.: 197 mg/dL (08 Nov 2018 08:05)  POCT Blood Glucose.: 192 mg/dL (07 Nov 2018 22:14)  POCT Blood Glucose.: 208 mg/dL (07 Nov 2018 16:59)      CAPILLARY BLOOD GLUCOSE      POCT Blood Glucose.: 282 mg/dL (08 Nov 2018 11:47)  POCT Blood Glucose.: 197 mg/dL (08 Nov 2018 08:05)  POCT Blood Glucose.: 192 mg/dL (07 Nov 2018 22:14)  POCT Blood Glucose.: 208 mg/dL (07 Nov 2018 16:59)    CAPILLARY BLOOD GLUCOSE      POCT Blood Glucose.: 282 mg/dL (08 Nov 2018 11:47)            RECENT CULTURES:      RADIOLOGY & ADDITIONAL TESTS:                        DVT/GI ppx  Discussed with pt @ bedside

## 2018-11-08 NOTE — PROGRESS NOTE ADULT - SUBJECTIVE AND OBJECTIVE BOX
pt seen  no complaints  ICU Vital Signs Last 24 Hrs  T(C): 37.1 (08 Nov 2018 04:37), Max: 37.1 (08 Nov 2018 04:37)  T(F): 98.7 (08 Nov 2018 04:37), Max: 98.7 (08 Nov 2018 04:37)  HR: 67 (08 Nov 2018 04:37) (66 - 69)  BP: 144/68 (08 Nov 2018 04:37) (106/63 - 166/65)  BP(mean): --  ABP: --  ABP(mean): --  RR: 17 (08 Nov 2018 04:37) (16 - 18)  SpO2: 95% (08 Nov 2018 04:37) (92% - 96%)  gen-NAD  resp-clear  abd-soft NT/ND  TENA in place    I&O's Detail    07 Nov 2018 07:01  -  08 Nov 2018 07:00  --------------------------------------------------------  IN:  Total IN: 0 mL    OUT:  Total OUT: 0 mL    Total NET: 0 mL

## 2018-11-08 NOTE — PROGRESS NOTE ADULT - SUBJECTIVE AND OBJECTIVE BOX
CHIEF COMPLAINT: Patient is a 68y old  Female who presents with a chief complaint of Sepsis (08 Nov 2018 10:51)      HPI:  Hx obtained from daughter  Patient is a 68y old female pmhx CAD s/p stent (2007), ESRD on HD (MWF; missed today's session), DM, HTN, HLD brought in with complaints of constant "hard pressure like" abdominal pain, nausea, vomiting, fever  for 2 days duration. Watery nonbloody nonfoul smelling diarrhea x 7 yesterday and 1 episode this am and NBNB vomiting x 7-8 yesterday and once today. For the past couple weeks pt has been having pain after meals, so has had a decreased PO intake. Did not trying anything for the pain and has never experienced this in the past. Endorses generalized weakness, fevers, chills. Denies recent abx usage, sick contacts, bloody stool, CP, SOB, dysuria. Pt has dialysis catheter changed 2 days ago back after it was accidently pulled out. Last dialysis session was Friday.     Upon arrival to ED patient febrile at 102.9, tachycardiac at 104 and normotensive and labs significant for WBC 23.96 30% bands elevated transaminitis, elevated tbili and elevated lactate x2, Cr 5.4.  procal elevated. UA neg Since presentation BP steadily dropping. BP dropping originally  now with SBP 98. EKG: sinus tach  CT chest abd pelvis: acute cholecystitis  RVP:neg  CXR: increased congestions  Patient given vancomycin and zosyn, tylenol and 2L IVF. (29 Oct 2018 18:49)      Follow Up: Patient is awake and alert with no specific complaints. She reports no pain, dyspnea, or palpitations    Interval History: CAD, HTN, RBBB    EKG:  < from: 12 Lead ECG (11.02.18 @ 08:59) >  Ventricular Rate 72 BPM    Atrial Rate 72 BPM    P-R Interval 156 ms    QRS Duration 156 ms    Q-T Interval 444 ms    QTC Calculation(Bezet) 486 ms    P Axis 50 degrees    R Axis 90 degrees    T Axis -14 degrees    Diagnosis Line Normal sinus rhythm  Right bundle branch block  Inferior infarct (cited on or before 31-OCT-2018)  Abnormal ECG        REVIEW OF SYSTEMS:   All other review of systems are negative unless indicated above    PAST MEDICAL & SURGICAL HISTORY:  ESRD (end stage renal disease) on dialysis: MWF  CAD (coronary artery disease): s/p stent in 2007  Diabetes  Myocardial infarction  Hypertension  H/O: hysterectomy      SOCIAL HISTORY:  No tobacco, ethanol, or drug abuse.    FAMILY HISTORY:  No pertinent family history in first degree relatives    No family history of acute MI or sudden cardiac death.    MEDICATIONS  (STANDING):  aspirin enteric coated 81 milliGRAM(s) Oral daily  atorvastatin 40 milliGRAM(s) Oral at bedtime  calcium carbonate 1250 mG  + Vitamin D (OsCal 500 + D) 1 Tablet(s) Oral daily  chlorhexidine 2% Cloths 1 Application(s) Topical daily  clopidogrel Tablet 75 milliGRAM(s) Oral daily  dextrose 5%. 1000 milliLiter(s) (50 mL/Hr) IV Continuous <Continuous>  dextrose 50% Injectable 12.5 Gram(s) IV Push once  dextrose 50% Injectable 25 Gram(s) IV Push once  dextrose 50% Injectable 25 Gram(s) IV Push once  docusate sodium 100 milliGRAM(s) Oral three times a day  ferrous    sulfate 325 milliGRAM(s) Oral three times a day  gabapentin 300 milliGRAM(s) Oral daily  heparin  Injectable 5000 Unit(s) SubCutaneous every 8 hours  insulin lispro (HumaLOG) corrective regimen sliding scale   SubCutaneous Before meals and at bedtime  isosorbide   mononitrate ER Tablet (IMDUR) 30 milliGRAM(s) Oral every 24 hours  isosorbide   mononitrate ER Tablet (IMDUR) 60 milliGRAM(s) Oral <User Schedule>  metoprolol tartrate 50 milliGRAM(s) Oral two times a day  NIFEdipine XL 60 milliGRAM(s) Oral daily  pantoprazole    Tablet 40 milliGRAM(s) Oral before breakfast  polyethylene glycol 3350 17 Gram(s) Oral daily  ursodiol Capsule 300 milliGRAM(s) Oral every 12 hours    MEDICATIONS  (PRN):  acetaminophen   Tablet .. 650 milliGRAM(s) Oral every 6 hours PRN Mild Pain (1 - 3)  dextrose 40% Gel 15 Gram(s) Oral once PRN Blood Glucose LESS THAN 70 milliGRAM(s)/deciliter  glucagon  Injectable 1 milliGRAM(s) IntraMuscular once PRN Glucose LESS THAN 70 milligrams/deciliter  simethicone 80 milliGRAM(s) Chew every 6 hours PRN abdominal bloating      Allergies    No Known Drug Allergies  Purell (Rash)    Intolerances        Home meds:  Home Medications:  Aspirin Enteric Coated 81 mg oral delayed release tablet: 1 tab(s) orally once a day (01 Nov 2018 21:41)  calcium-vitamin D 500 mg-200 intl units oral tablet: 1 tab(s) orally once a day (01 Nov 2018 21:41)  clopidogrel 75 mg oral tablet: 1 tab(s) orally once a day (01 Nov 2018 21:41)  docusate: 100 milligram(s) orally once a day (01 Nov 2018 21:41)  epoetin analilia: 60867 unit(s) intravenously once a month (01 Nov 2018 21:41)  ferrous sulfate 325 mg (65 mg elemental iron) oral tablet: 1 tab(s) orally 3 times a day (01 Nov 2018 21:41)  gabapentin 300 mg oral capsule: 1 cap(s) orally 2 times a day (01 Nov 2018 21:41)  isosorbide mononitrate 30 mg oral tablet, extended release: 2 tab(s) orally once a day (in the morning) (01 Nov 2018 21:41)  isosorbide mononitrate 30 mg oral tablet, extended release: 1 tab(s) orally once a day (in the evening) (01 Nov 2018 21:41)  metoprolol tartrate 50 mg oral tablet: 1 tab(s) orally 2 times a day (01 Nov 2018 21:41)  NIFEdipine 60 mg oral tablet, extended release: 1 tab(s) orally once a day (01 Nov 2018 21:41)  rosuvastatin 20 mg oral tablet: 1 tab(s) orally once a day (01 Nov 2018 21:41)        VITAL SIGNS:   Vital Signs Last 24 Hrs  T(C): 37.1 (08 Nov 2018 04:37), Max: 37.1 (08 Nov 2018 04:37)  T(F): 98.7 (08 Nov 2018 04:37), Max: 98.7 (08 Nov 2018 04:37)  HR: 67 (08 Nov 2018 04:37) (66 - 69)  BP: 144/68 (08 Nov 2018 04:37) (106/63 - 166/65)  BP(mean): --  RR: 17 (08 Nov 2018 04:37) (16 - 18)  SpO2: 95% (08 Nov 2018 04:37) (92% - 96%)    I&O's Summary    07 Nov 2018 07:01  -  08 Nov 2018 07:00  --------------------------------------------------------  IN: 0 mL / OUT: 0 mL / NET: 0 mL        On Exam:  TELE: not on tele  Constitutional: NAD, awake and alert, well-developed  HEENT: Moist Mucous Membranes, Anicteric  Pulmonary: Non-labored, breath sounds are clear bilaterally, No wheezing, rales or rhonchi  Cardiovascular: PMI not palpable RRR normal S1 and S2, no murmurs, rubs, gallops or clicks  Gastrointestinal: Bowel Sounds present, soft, nontender.   Lymph: No peripheral edema. No lymphadenopathy.  Skin: No visible rashes or ulcers. Right SC Shiley with dsg c/d/i  Psych:  Mood & affect appropriate      LABS: All Labs Reviewed:                        8.4    12.65 )-----------( 269      ( 06 Nov 2018 09:53 )             27.3                         8.7    12.82 )-----------( 257      ( 06 Nov 2018 09:25 )             28.4     06 Nov 2018 09:53    132    |  96     |  75     ----------------------------<  227    4.2     |  24     |  7.90   06 Nov 2018 09:25    132    |  96     |  72     ----------------------------<  176    4.3     |  24     |  7.60     Ca    8.2        06 Nov 2018 09:53  Ca    8.4        06 Nov 2018 09:25    TPro  6.4    /  Alb  1.7    /  TBili  1.1    /  DBili  x      /  AST  22     /  ALT  31     /  AlkPhos  325    06 Nov 2018 09:25      Blood Culture: Organism --  Gram Stain Blood -- Gram Stain   No polymorphonuclear leukocytes seen  No organisms seen  by cytocentrifuge  Specimen Source .Body Fluid Bile Fluid  Culture-Blood --    < from: TTE Echo Doppler w/o Cont (05.04.18 @ 20:56) >  Conclusion:  1. Technically difficult limited supine study. Please note that this is a   portable study and the study is also limited due to patient's body   habitus.  2. 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  3. Enlarged left atrium with associated mild mitral regurgitation.  4. Very limited visualization left ventricle which may represent mild   left ventricular concentric hypertrophy with a well-preserved ejection   fraction as described above.  5. A very small posterior pericardial effusion is suggested but not   diagnostic.  6. Correlate these limited findings clinically.      RADIOLOGY:    < from: Xray Chest 1 View-PORTABLE IMMEDIATE (10.31.18 @ 21:52) >  Single AP view submitted.    Patient is rotated.    Nasogastric tube is present, tip below the level of the hemidiaphragm.    Again noted is right-sided dialysis catheter.  The left-sided central venous catheter appears to been removed.    The evaluation of the cardiomediastinal silhouette is limited on portable   technique.    Low lung volumes are present.  Again noted is perihilar airspace consolidation, most pronounced in the   right.  There are probable small bilateral pleural effusions.

## 2018-11-08 NOTE — PROGRESS NOTE ADULT - PROBLEM SELECTOR PLAN 1
2/2 acute calculus cholecystitis with biliary dilatation with common bile duct stone.   Blood Cx + for ESBL. E.coli antibx switched to Ertapenem.  to complete iv abx in am 11/7 and can proceed d/c home  today family wants pt to go to rehab   looking for placement

## 2018-11-08 NOTE — PROGRESS NOTE ADULT - ASSESSMENT
·	ESRD on HD  ·	Cholecystitis, s/p Perc cholecystostomy  ·	Diabetes  ·	Hypertension    HD MWF. To continue HD TIW as scheduled. Fluid removal as tolerated by BP.   Dietary and PO fluid restriction. Epogen as needed for anemia. On IV abx.  Monitor BP trend. Titrate BP meds as needed. Salt restriction. Surgery follow up as out pt.   Monitor blood sugar levels. Insulin coverage as needed. D/c planning.

## 2018-11-09 DIAGNOSIS — R41.82 ALTERED MENTAL STATUS, UNSPECIFIED: ICD-10-CM

## 2018-11-09 LAB
ANION GAP SERPL CALC-SCNC: 8 MMOL/L — SIGNIFICANT CHANGE UP (ref 5–17)
BUN SERPL-MCNC: 60 MG/DL — HIGH (ref 7–23)
CALCIUM SERPL-MCNC: 7.8 MG/DL — LOW (ref 8.5–10.1)
CHLORIDE SERPL-SCNC: 97 MMOL/L — SIGNIFICANT CHANGE UP (ref 96–108)
CO2 SERPL-SCNC: 29 MMOL/L — SIGNIFICANT CHANGE UP (ref 22–31)
CREAT SERPL-MCNC: 7.4 MG/DL — HIGH (ref 0.5–1.3)
GLUCOSE SERPL-MCNC: 196 MG/DL — HIGH (ref 70–99)
HCT VFR BLD CALC: 25.8 % — LOW (ref 34.5–45)
HGB BLD-MCNC: 8 G/DL — LOW (ref 11.5–15.5)
MCHC RBC-ENTMCNC: 27.1 PG — SIGNIFICANT CHANGE UP (ref 27–34)
MCHC RBC-ENTMCNC: 31 GM/DL — LOW (ref 32–36)
MCV RBC AUTO: 87.5 FL — SIGNIFICANT CHANGE UP (ref 80–100)
NRBC # BLD: 0 /100 WBCS — SIGNIFICANT CHANGE UP (ref 0–0)
PLATELET # BLD AUTO: 394 K/UL — SIGNIFICANT CHANGE UP (ref 150–400)
POTASSIUM SERPL-MCNC: 4.4 MMOL/L — SIGNIFICANT CHANGE UP (ref 3.5–5.3)
POTASSIUM SERPL-SCNC: 4.4 MMOL/L — SIGNIFICANT CHANGE UP (ref 3.5–5.3)
RBC # BLD: 2.95 M/UL — LOW (ref 3.8–5.2)
RBC # FLD: 16.4 % — HIGH (ref 10.3–14.5)
SODIUM SERPL-SCNC: 134 MMOL/L — LOW (ref 135–145)
WBC # BLD: 14.22 K/UL — HIGH (ref 3.8–10.5)
WBC # FLD AUTO: 14.22 K/UL — HIGH (ref 3.8–10.5)

## 2018-11-09 PROCEDURE — 99232 SBSQ HOSP IP/OBS MODERATE 35: CPT

## 2018-11-09 RX ORDER — NIFEDIPINE 30 MG
90 TABLET, EXTENDED RELEASE 24 HR ORAL DAILY
Qty: 0 | Refills: 0 | Status: DISCONTINUED | OUTPATIENT
Start: 2018-11-10 | End: 2018-11-16

## 2018-11-09 RX ORDER — ERYTHROPOIETIN 10000 [IU]/ML
10000 INJECTION, SOLUTION INTRAVENOUS; SUBCUTANEOUS
Qty: 0 | Refills: 0 | Status: DISCONTINUED | OUTPATIENT
Start: 2018-11-09 | End: 2018-11-16

## 2018-11-09 RX ORDER — NIFEDIPINE 30 MG
90 TABLET, EXTENDED RELEASE 24 HR ORAL ONCE
Qty: 0 | Refills: 0 | Status: COMPLETED | OUTPATIENT
Start: 2018-11-09 | End: 2018-11-09

## 2018-11-09 RX ADMIN — ISOSORBIDE MONONITRATE 30 MILLIGRAM(S): 60 TABLET, EXTENDED RELEASE ORAL at 17:54

## 2018-11-09 RX ADMIN — CLOPIDOGREL BISULFATE 75 MILLIGRAM(S): 75 TABLET, FILM COATED ORAL at 14:32

## 2018-11-09 RX ADMIN — HEPARIN SODIUM 5000 UNIT(S): 5000 INJECTION INTRAVENOUS; SUBCUTANEOUS at 22:00

## 2018-11-09 RX ADMIN — Medication 100 MILLIGRAM(S): at 21:47

## 2018-11-09 RX ADMIN — CHLORHEXIDINE GLUCONATE 1 APPLICATION(S): 213 SOLUTION TOPICAL at 14:21

## 2018-11-09 RX ADMIN — GABAPENTIN 300 MILLIGRAM(S): 400 CAPSULE ORAL at 14:28

## 2018-11-09 RX ADMIN — POLYETHYLENE GLYCOL 3350 17 GRAM(S): 17 POWDER, FOR SOLUTION ORAL at 14:29

## 2018-11-09 RX ADMIN — Medication 4: at 21:48

## 2018-11-09 RX ADMIN — Medication 50 MILLIGRAM(S): at 17:54

## 2018-11-09 RX ADMIN — URSODIOL 300 MILLIGRAM(S): 250 TABLET, FILM COATED ORAL at 17:53

## 2018-11-09 RX ADMIN — Medication 100 MILLIGRAM(S): at 14:29

## 2018-11-09 RX ADMIN — ERYTHROPOIETIN 10000 UNIT(S): 10000 INJECTION, SOLUTION INTRAVENOUS; SUBCUTANEOUS at 12:23

## 2018-11-09 RX ADMIN — Medication 325 MILLIGRAM(S): at 21:47

## 2018-11-09 RX ADMIN — Medication 90 MILLIGRAM(S): at 14:55

## 2018-11-09 RX ADMIN — Medication 2: at 08:32

## 2018-11-09 RX ADMIN — Medication 325 MILLIGRAM(S): at 14:28

## 2018-11-09 RX ADMIN — HEPARIN SODIUM 5000 UNIT(S): 5000 INJECTION INTRAVENOUS; SUBCUTANEOUS at 14:29

## 2018-11-09 RX ADMIN — ATORVASTATIN CALCIUM 40 MILLIGRAM(S): 80 TABLET, FILM COATED ORAL at 21:47

## 2018-11-09 NOTE — PROGRESS NOTE ADULT - PROBLEM SELECTOR PLAN 3
likely multifactorial, sepsis, acute illness, renal insufficiency  hgb trend noted  no overt s/s gib  trend h/h, transfuse prn  consider iron studies  ppi qd  if hgb continues to downtrend check fobt  will follow

## 2018-11-09 NOTE — PROGRESS NOTE ADULT - SUBJECTIVE AND OBJECTIVE BOX
IRENE TAYLOR  68y  Female    Patient is a 68y old  Female who presents with a chief complaint of Sepsis (09 Nov 2018 11:45)      HPI:  Hx obtained from daughter  Patient is a 68y old female pmhx CAD s/p stent (2007), ESRD on HD (MWF; missed today's session), DM, HTN, HLD brought in with complaints of constant "hard pressure like" abdominal pain, nausea, vomiting, fever  for 2 days duration. Watery nonbloody nonfoul smelling diarrhea x 7 yesterday and 1 episode this am and NBNB vomiting x 7-8 yesterday and once today. For the past couple weeks pt has been having pain after meals, so has had a decreased PO intake. Did not trying anything for the pain and has never experienced this in the past. Endorses generalized weakness, fevers, chills. Denies recent abx usage, sick contacts, bloody stool, CP, SOB, dysuria. Pt has dialysis catheter changed 2 days ago back after it was accidently pulled out. Last dialysis session was Friday.     PAST MEDICAL & SURGICAL HISTORY:  ESRD (end stage renal disease) on dialysis: MWF  CAD (coronary artery disease): s/p stent in 2007  Diabetes  Myocardial infarction  Hypertension  H/O: hysterectomy    PHYSICAL EXAM:    T(C): 36.7 (11-09-18 @ 13:15), Max: 36.9 (11-08-18 @ 20:17)  HR: 73 (11-09-18 @ 13:15) (63 - 73)  BP: 144/70 (11-09-18 @ 13:15) (123/72 - 168/78)  RR: 16 (11-09-18 @ 13:15) (16 - 18)  SpO2: 100% (11-09-18 @ 13:15) (95% - 100%)  Wt(kg): --    I&O's Detail    08 Nov 2018 07:01  -  09 Nov 2018 07:00  --------------------------------------------------------  IN:  Total IN: 0 mL    OUT:    Drain: 30 mL  Total OUT: 30 mL    Total NET: -30 mL      09 Nov 2018 07:01  -  09 Nov 2018 14:36  --------------------------------------------------------  IN:    Oral Fluid: 360 mL    Other: 900 mL  Total IN: 1260 mL    OUT:    Other: 2900 mL  Total OUT: 2900 mL    Total NET: -1640 mL          Respiratory: clear anteriorly, decreased BS at bases  Cardiovascular: S1 S2  Gastrointestinal: soft NT ND +BS  Extremities: trace edema   Neuro: Awake and alert    MEDICATIONS  (STANDING):  aspirin enteric coated 81 milliGRAM(s) Oral daily  atorvastatin 40 milliGRAM(s) Oral at bedtime  calcium carbonate 1250 mG  + Vitamin D (OsCal 500 + D) 1 Tablet(s) Oral daily  chlorhexidine 2% Cloths 1 Application(s) Topical daily  clopidogrel Tablet 75 milliGRAM(s) Oral daily  dextrose 5%. 1000 milliLiter(s) (50 mL/Hr) IV Continuous <Continuous>  dextrose 50% Injectable 12.5 Gram(s) IV Push once  dextrose 50% Injectable 25 Gram(s) IV Push once  dextrose 50% Injectable 25 Gram(s) IV Push once  docusate sodium 100 milliGRAM(s) Oral three times a day  epoetin analilia Injectable 13556 Unit(s) IV Push <User Schedule>  ferrous    sulfate 325 milliGRAM(s) Oral three times a day  gabapentin 300 milliGRAM(s) Oral daily  heparin  Injectable 5000 Unit(s) SubCutaneous every 8 hours  insulin lispro (HumaLOG) corrective regimen sliding scale   SubCutaneous Before meals and at bedtime  isosorbide   mononitrate ER Tablet (IMDUR) 30 milliGRAM(s) Oral every 24 hours  isosorbide   mononitrate ER Tablet (IMDUR) 60 milliGRAM(s) Oral <User Schedule>  metoprolol tartrate 50 milliGRAM(s) Oral two times a day  pantoprazole    Tablet 40 milliGRAM(s) Oral before breakfast  polyethylene glycol 3350 17 Gram(s) Oral daily  ursodiol Capsule 300 milliGRAM(s) Oral every 12 hours    MEDICATIONS  (PRN):  acetaminophen   Tablet .. 650 milliGRAM(s) Oral every 6 hours PRN Mild Pain (1 - 3)  dextrose 40% Gel 15 Gram(s) Oral once PRN Blood Glucose LESS THAN 70 milliGRAM(s)/deciliter  glucagon  Injectable 1 milliGRAM(s) IntraMuscular once PRN Glucose LESS THAN 70 milligrams/deciliter  simethicone 80 milliGRAM(s) Chew every 6 hours PRN abdominal bloating                            8.0    14.22 )-----------( 394      ( 09 Nov 2018 10:53 )             25.8       11-09    134<L>  |  97  |  60<H>  ----------------------------<  196<H>  4.4   |  29  |  7.40<H>    Ca    7.8<L>      09 Nov 2018 10:53

## 2018-11-09 NOTE — PROGRESS NOTE ADULT - SUBJECTIVE AND OBJECTIVE BOX
INTERVAL HPI/OVERNIGHT EVENTS:  pt seen and examined   confused this am  afebrile overnight cbc noted    MEDICATIONS  (STANDING):  aspirin enteric coated 81 milliGRAM(s) Oral daily  atorvastatin 40 milliGRAM(s) Oral at bedtime  calcium carbonate 1250 mG  + Vitamin D (OsCal 500 + D) 1 Tablet(s) Oral daily  chlorhexidine 2% Cloths 1 Application(s) Topical daily  clopidogrel Tablet 75 milliGRAM(s) Oral daily  dextrose 5%. 1000 milliLiter(s) (50 mL/Hr) IV Continuous <Continuous>  dextrose 50% Injectable 12.5 Gram(s) IV Push once  dextrose 50% Injectable 25 Gram(s) IV Push once  dextrose 50% Injectable 25 Gram(s) IV Push once  docusate sodium 100 milliGRAM(s) Oral three times a day  epoetin analilia Injectable 24883 Unit(s) IV Push <User Schedule>  ferrous    sulfate 325 milliGRAM(s) Oral three times a day  gabapentin 300 milliGRAM(s) Oral daily  heparin  Injectable 5000 Unit(s) SubCutaneous every 8 hours  insulin lispro (HumaLOG) corrective regimen sliding scale   SubCutaneous Before meals and at bedtime  isosorbide   mononitrate ER Tablet (IMDUR) 30 milliGRAM(s) Oral every 24 hours  isosorbide   mononitrate ER Tablet (IMDUR) 60 milliGRAM(s) Oral <User Schedule>  metoprolol tartrate 50 milliGRAM(s) Oral two times a day  NIFEdipine XL 60 milliGRAM(s) Oral daily  pantoprazole    Tablet 40 milliGRAM(s) Oral before breakfast  polyethylene glycol 3350 17 Gram(s) Oral daily  ursodiol Capsule 300 milliGRAM(s) Oral every 12 hours    MEDICATIONS  (PRN):  acetaminophen   Tablet .. 650 milliGRAM(s) Oral every 6 hours PRN Mild Pain (1 - 3)  dextrose 40% Gel 15 Gram(s) Oral once PRN Blood Glucose LESS THAN 70 milliGRAM(s)/deciliter  glucagon  Injectable 1 milliGRAM(s) IntraMuscular once PRN Glucose LESS THAN 70 milligrams/deciliter  simethicone 80 milliGRAM(s) Chew every 6 hours PRN abdominal bloating      Allergies    No Known Drug Allergies  Purell (Rash)    Intolerances        Review of Systems:    unable to obtain      Vital Signs Last 24 Hrs  T(C): 36.9 (09 Nov 2018 09:45), Max: 36.9 (08 Nov 2018 20:17)  T(F): 98.4 (09 Nov 2018 09:45), Max: 98.4 (08 Nov 2018 20:17)  HR: 71 (09 Nov 2018 09:45) (63 - 71)  BP: 168/78 (09 Nov 2018 09:45) (123/72 - 168/78)  BP(mean): --  RR: 18 (09 Nov 2018 09:45) (17 - 18)  SpO2: 98% (09 Nov 2018 09:45) (95% - 98%)    PHYSICAL EXAM:  Constitutional: NAD, lying in bed  HEENT: ncat  Neck: No LAD  Respiratory: dec bs  Cardiovascular: S1 and S2, RRR  Gastrointestinal: soft nt mild dt perc kevin tube in place w minimal output  Extremities: no edema  Vascular: 2+ peripheral pulses  Neurological: awake but confused  Skin: No rashes      LABS:                        8.0    14.22 )-----------( 394      ( 09 Nov 2018 10:53 )             25.8                 RADIOLOGY & ADDITIONAL TESTS:

## 2018-11-09 NOTE — PROGRESS NOTE ADULT - SUBJECTIVE AND OBJECTIVE BOX
pt seen  confused  in dialysis  ICU Vital Signs Last 24 Hrs  T(C): 36.9 (09 Nov 2018 09:45), Max: 36.9 (08 Nov 2018 20:17)  T(F): 98.4 (09 Nov 2018 09:45), Max: 98.4 (08 Nov 2018 20:17)  HR: 71 (09 Nov 2018 09:45) (63 - 71)  BP: 168/78 (09 Nov 2018 09:45) (123/72 - 168/78)  BP(mean): --  ABP: --  ABP(mean): --  RR: 18 (09 Nov 2018 09:45) (17 - 18)  SpO2: 98% (09 Nov 2018 09:45) (95% - 98%)  gen-NAD  resp-clear  abd-soft  NT/ND  I&O's Detail    08 Nov 2018 07:01  -  09 Nov 2018 07:00  --------------------------------------------------------  IN:  Total IN: 0 mL    OUT:    Drain: 30 mL  Total OUT: 30 mL    Total NET: -30 mL      09 Nov 2018 07:01  -  09 Nov 2018 11:12  --------------------------------------------------------  IN:    Oral Fluid: 360 mL  Total IN: 360 mL    OUT:  Total OUT: 0 mL    Total NET: 360 mL

## 2018-11-09 NOTE — PROGRESS NOTE ADULT - SUBJECTIVE AND OBJECTIVE BOX
Patient is a 68y old  Female who presents with a chief complaint of Sepsis (09 Nov 2018 11:38)        HPI:  Hx obtained from daughter  Patient is a 68y old female pmhx CAD s/p stent (2007), ESRD on HD (MWF; missed today's session), DM, HTN, HLD brought in with complaints of constant "hard pressure like" abdominal pain, nausea, vomiting, fever  for 2 days duration. Watery nonbloody nonfoul smelling diarrhea x 7 yesterday and 1 episode this am and NBNB vomiting x 7-8 yesterday and once today. For the past couple weeks pt has been having pain after meals, so has had a decreased PO intake. Did not trying anything for the pain and has never experienced this in the past. Endorses generalized weakness, fevers, chills. Denies recent abx usage, sick contacts, bloody stool, CP, SOB, dysuria. Pt has dialysis catheter changed 2 days ago back after it was accidently pulled out. Last dialysis session was Friday.     Upon arrival to ED patient febrile at 102.9, tachycardiac at 104 and normotensive and labs significant for WBC 23.96 30% bands elevated transaminitis, elevated tbili and elevated lactate x2, Cr 5.4.  procal elevated. UA neg Since presentation BP steadily dropping. BP dropping originally  now with SBP 98. EKG: sinus tach  CT chest abd pelvis: acute cholecystitis  RVP:neg  CXR: increased congestions  Patient given vancomycin and zosyn, tylenol and 2L IVF. 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     PHYSICAL EXAM:  T(C): 36.9 (11-09-18 @ 09:45), Max: 36.9 (11-08-18 @ 20:17)  HR: 71 (11-09-18 @ 09:45) (63 - 71)  BP: 168/78 (11-09-18 @ 09:45) (123/72 - 168/78)  RR: 18 (11-09-18 @ 09:45) (17 - 18)  SpO2: 98% (11-09-18 @ 09:45) (95% - 98%)  Wt(kg): --   GENERAL: confused, sedated   ENMT: Moist mucous membranes  NECK: Supple, No JVD  NERVOUS SYSTEM:  Alert & Oriented X3,   CHEST/LUNG: decrease air entry at the abses   HEART: Regular rate and rhythm; No murmurs, rubs, or gallops  ABDOMEN: Soft, Nontender, Nondistended; Bowel sounds present  EXTREMITIES:  2+ Peripheral Pulses, No clubbing, cyanosis, or edema        MEDICATIONS  (STANDING):  aspirin enteric coated 81 milliGRAM(s) Oral daily  atorvastatin 40 milliGRAM(s) Oral at bedtime  calcium carbonate 1250 mG  + Vitamin D (OsCal 500 + D) 1 Tablet(s) Oral daily  chlorhexidine 2% Cloths 1 Application(s) Topical daily  clopidogrel Tablet 75 milliGRAM(s) Oral daily  dextrose 5%. 1000 milliLiter(s) (50 mL/Hr) IV Continuous <Continuous>  dextrose 50% Injectable 12.5 Gram(s) IV Push once  dextrose 50% Injectable 25 Gram(s) IV Push once  dextrose 50% Injectable 25 Gram(s) IV Push once  docusate sodium 100 milliGRAM(s) Oral three times a day  epoetin analilia Injectable 15098 Unit(s) IV Push <User Schedule>  ferrous    sulfate 325 milliGRAM(s) Oral three times a day  gabapentin 300 milliGRAM(s) Oral daily  heparin  Injectable 5000 Unit(s) SubCutaneous every 8 hours  insulin lispro (HumaLOG) corrective regimen sliding scale   SubCutaneous Before meals and at bedtime  isosorbide   mononitrate ER Tablet (IMDUR) 30 milliGRAM(s) Oral every 24 hours  isosorbide   mononitrate ER Tablet (IMDUR) 60 milliGRAM(s) Oral <User Schedule>  metoprolol tartrate 50 milliGRAM(s) Oral two times a day  pantoprazole    Tablet 40 milliGRAM(s) Oral before breakfast  polyethylene glycol 3350 17 Gram(s) Oral daily  ursodiol Capsule 300 milliGRAM(s) Oral every 12 hours    MEDICATIONS  (PRN):  acetaminophen   Tablet .. 650 milliGRAM(s) Oral every 6 hours PRN Mild Pain (1 - 3)  dextrose 40% Gel 15 Gram(s) Oral once PRN Blood Glucose LESS THAN 70 milliGRAM(s)/deciliter  glucagon  Injectable 1 milliGRAM(s) IntraMuscular once PRN Glucose LESS THAN 70 milligrams/deciliter  simethicone 80 milliGRAM(s) Chew every 6 hours PRN abdominal bloating      LABS:                        8.0    14.22 )-----------( 394      ( 09 Nov 2018 10:53 )             25.8     11-09    134<L>  |  97  |  60<H>  ----------------------------<  196<H>  4.4   |  29  |  7.40<H>    Ca    7.8<L>      09 Nov 2018 10:53            CAPILLARY BLOOD GLUCOSE      POCT Blood Glucose.: 184 mg/dL (09 Nov 2018 08:15)  POCT Blood Glucose.: 165 mg/dL (08 Nov 2018 21:11)  POCT Blood Glucose.: 211 mg/dL (08 Nov 2018 17:08)  POCT Blood Glucose.: 282 mg/dL (08 Nov 2018 11:47)      CAPILLARY BLOOD GLUCOSE      POCT Blood Glucose.: 184 mg/dL (09 Nov 2018 08:15)  POCT Blood Glucose.: 165 mg/dL (08 Nov 2018 21:11)  POCT Blood Glucose.: 211 mg/dL (08 Nov 2018 17:08)  POCT Blood Glucose.: 282 mg/dL (08 Nov 2018 11:47)    CAPILLARY BLOOD GLUCOSE      POCT Blood Glucose.: 184 mg/dL (09 Nov 2018 08:15)            RECENT CULTURES:      RADIOLOGY & ADDITIONAL TESTS:                        DVT/GI ppx  Discussed with pt @ bedside

## 2018-11-09 NOTE — CHART NOTE - NSCHARTNOTEFT_GEN_A_CORE
Called by RN for fall. Nursing heard be alarm go off and found patient sliding off her bed with the knees touching the ground.     Pt seen and examined at bedside.  # [id number].  Pt offers no complaints. Denies CP, SOB, palpitations, abdominal pain, nausea, vomiting, dizziness, headache. Pt is oriented only to self and date. Believes she is in her house and unable to be redirected. Pt does not have any memory of a fall tonight.      Vitals  Vital Signs Last 24 Hrs  T(C): 36.8 (09 Nov 2018 02:48), Max: 37.1 (08 Nov 2018 04:37)  T(F): 98.2 (09 Nov 2018 02:48), Max: 98.7 (08 Nov 2018 04:37)  HR: 66 (09 Nov 2018 02:48) (63 - 67)  BP: 158/69 (09 Nov 2018 02:48) (123/72 - 158/69)  BP(mean): --  RR: 17 (09 Nov 2018 02:48) (17 - 18)  SpO2: 95% (09 Nov 2018 02:48) (95% - 97%)      Constitutional: NAD, lying in bed  HEENT: ncat, PERRLA, EOCMI  Respiratory: poor inspiratory effort, CTA bl  Cardiovascular: RRR, S1,S2 no murmurs  Gastrointestinal: sl distended, non tender, mild dt perc kevin tube in place w minimal output  Extremities: no edema, no cyanosis, no deformities, strength 5/5 b/l  Vascular: 2+ peripheral pulses  Neurological: awake alert, oriented x 2. Believes she is in her home CN II-XII intact. DTRs 1+,  sensation grossly intact  Skin: No rashes, no abrasions, hematomas,       A/P:   68F with PMH of CAD s/p stent (2007), ESRD on HD (MWF; missed today's session), DM type 2, and HTN presents with fever and admitted with severe sepsis secondary to acute choledocholithiasis. s/p PCT cholecystectomy and drainage     Fall, likely slid out of bed  - No signs of trauma, neurologic exam unchanged from previous   - Ordered Constant Observation  - Will continue to follow. RN to call with status change. Called by RN for fall. Nursing heard be alarm go off and found patient sliding off her bed with the knees touching the ground.     Pt seen and examined at bedside.  # [id number].  Pt offers no complaints. Denies CP, SOB, palpitations, abdominal pain, nausea, vomiting, dizziness, headache. Pt is oriented only to self and date. Believes she is in her house, but able to be redirected. Pt does not have any memory of a fall tonight.      Vitals  Vital Signs Last 24 Hrs  T(C): 36.8 (09 Nov 2018 02:48), Max: 37.1 (08 Nov 2018 04:37)  T(F): 98.2 (09 Nov 2018 02:48), Max: 98.7 (08 Nov 2018 04:37)  HR: 66 (09 Nov 2018 02:48) (63 - 67)  BP: 158/69 (09 Nov 2018 02:48) (123/72 - 158/69)  BP(mean): --  RR: 17 (09 Nov 2018 02:48) (17 - 18)  SpO2: 95% (09 Nov 2018 02:48) (95% - 97%)      Constitutional: NAD, lying in bed  HEENT: ncat, PERRLA, EOCMI  Respiratory: poor inspiratory effort, CTA bl  Cardiovascular: RRR, S1,S2 no murmurs  Gastrointestinal: sl distended, non tender, mild dt perc kevin tube in place w minimal output  Extremities: no edema, no cyanosis, no deformities, strength 5/5 b/l  Vascular: 2+ peripheral pulses  Neurological: awake alert, oriented x 2. Believes she is in her home CN II-XII intact. DTRs 1+,  sensation grossly intact  Skin: No rashes, no abrasions, hematomas,       A/P:   68F with PMH of CAD s/p stent (2007), ESRD on HD (MWF; missed today's session), DM type 2, and HTN presents with fever and admitted with severe sepsis secondary to acute choledocholithiasis. s/p PCT cholecystectomy and drainage     Fall, likely slid out of bed  - No signs of trauma, neurologic exam unchanged from previous   - Ordered Constant Observation  - Will continue to follow. RN to call with status change. Called by RN for fall. Nursing heard be alarm go off and found patient sliding off her bed with the knees touching the ground.     Pt seen and examined at bedside.  # [id number].  Pt offers no complaints. Denies msk pain, or soreness, CP, SOB, palpitations, abdominal pain, nausea, vomiting, dizziness, headache. Pt is oriented only to self and date. Believes she is in her house, but able to be redirected. Pt does not have any memory of a fall tonight.      Vitals  Vital Signs Last 24 Hrs  T(C): 36.8 (09 Nov 2018 02:48), Max: 37.1 (08 Nov 2018 04:37)  T(F): 98.2 (09 Nov 2018 02:48), Max: 98.7 (08 Nov 2018 04:37)  HR: 66 (09 Nov 2018 02:48) (63 - 67)  BP: 158/69 (09 Nov 2018 02:48) (123/72 - 158/69)  BP(mean): --  RR: 17 (09 Nov 2018 02:48) (17 - 18)  SpO2: 95% (09 Nov 2018 02:48) (95% - 97%)      Constitutional: NAD, lying in bed  HEENT: ncat, PERRLA, EOCMI  Respiratory: poor inspiratory effort, CTA bl  Cardiovascular: RRR, S1,S2 no murmurs  Gastrointestinal: sl distended, non tender, mild dt perc kevin tube in place w minimal output  Extremities: no edema, no cyanosis, no deformities, strength 5/5 b/l  Vascular: 2+ peripheral pulses  Neurological: awake alert, oriented x 2. Believes she is in her home CN II-XII intact. DTRs 1+,  sensation grossly intact  Skin: No rashes, no abrasions, hematomas,       A/P:   68F with PMH of CAD s/p stent (2007), ESRD on HD (MWF; missed today's session), DM type 2, and HTN presents with fever and admitted with severe sepsis secondary to acute choledocholithiasis. s/p PCT cholecystectomy and drainage     Fall, likely slid out of bed  - No signs of trauma, neurologic exam unchanged from previous   - Ordered Constant Observation  - Will continue to follow. RN to call with status change.

## 2018-11-09 NOTE — PROGRESS NOTE ADULT - ASSESSMENT
Patient is now  status post cholecystotomy tube for biliary sepsis on 10/30.  She had rapid atrial fibrillation on 10/30 and converted to sinus rhythm. She required pressor therapy which has now been discontinued. Her white blood count improved today remains hemodynamically stable. She has chronic renal failure on hemodialysis. Her elevated troponin level may represent a component of subendocardial ischemia without true atherothrombosis. These results are nonspecific in the setting of her kidney disease. Currently afebrile and arousable with no evidence of decompensated heart failure or active ischemic    Recommend:  - s/p fall last night with no overt injuries.  Fall precaution  - cont dual antiplatelet therapy for now for CAD s/p stents  - cont statin Rx  - cont metoprolol  - cont isosorbide mononitrate  - Increase nifedipine for HTN  - renal f/u and volume removal with HD  - DVT ppx  - D/C planning    Will continue to follow while inpatient    Lenore Long NP  Cardiology Patient is now  status post cholecystotomy tube for biliary sepsis on 10/30.  She had rapid atrial fibrillation on 10/30 and converted to sinus rhythm. She required pressor therapy which has now been discontinued. Her white blood count improved today remains hemodynamically stable. She has chronic renal failure on hemodialysis. Her elevated troponin level may represent a component of subendocardial ischemia without true atherothrombosis. These results are nonspecific in the setting of her kidney disease. Currently afebrile and arousable with no evidence of decompensated heart failure or active ischemic    Recommend:  - s/p fall last night with no overt injuries.  Fall precaution  - cont dual antiplatelet therapy for now for CAD s/p stents  - cont statin Rx  - cont metoprolol  - cont isosorbide mononitrate  - Increase nifedipine for HTN  - renal f/u and volume removal with HD.  Currently undergoing HD with plan of [id number], then discharge  - DVT ppx  - Awaiting discharge     Will continue to follow while inpatient    Lenore Long NP  Cardiology    Lenore Long NP  Cardiology

## 2018-11-09 NOTE — PROGRESS NOTE ADULT - SUBJECTIVE AND OBJECTIVE BOX
[facility] -- Glynn Bullock, Claudia Davis Pannella, Patel, Savella  Office # [phone]    Follow Up:  CAD s/p stents, preop/postop    Subjective/Observations:       REVIEW OF SYSTEMS: All other review of systems is negative unless indicated above    PAST MEDICAL & SURGICAL HISTORY:  ESRD (end stage renal disease) on dialysis: MWF  CAD (coronary artery disease): s/p stent in 2007  Diabetes  Myocardial infarction  Hypertension  H/O: hysterectomy    MEDICATIONS  (STANDING):  aspirin enteric coated 81 milliGRAM(s) Oral daily  atorvastatin 40 milliGRAM(s) Oral at bedtime  calcium carbonate 1250 mG  + Vitamin D (OsCal 500 + D) 1 Tablet(s) Oral daily  chlorhexidine 2% Cloths 1 Application(s) Topical daily  clopidogrel Tablet 75 milliGRAM(s) Oral daily  dextrose 5%. 1000 milliLiter(s) (50 mL/Hr) IV Continuous <Continuous>  dextrose 50% Injectable 12.5 Gram(s) IV Push once  dextrose 50% Injectable 25 Gram(s) IV Push once  dextrose 50% Injectable 25 Gram(s) IV Push once  docusate sodium 100 milliGRAM(s) Oral three times a day  epoetin analilia Injectable 15856 Unit(s) IV Push <User Schedule>  ferrous    sulfate 325 milliGRAM(s) Oral three times a day  gabapentin 300 milliGRAM(s) Oral daily  heparin  Injectable 5000 Unit(s) SubCutaneous every 8 hours  insulin lispro (HumaLOG) corrective regimen sliding scale   SubCutaneous Before meals and at bedtime  isosorbide   mononitrate ER Tablet (IMDUR) 30 milliGRAM(s) Oral every 24 hours  isosorbide   mononitrate ER Tablet (IMDUR) 60 milliGRAM(s) Oral <User Schedule>  metoprolol tartrate 50 milliGRAM(s) Oral two times a day  NIFEdipine XL 60 milliGRAM(s) Oral daily  pantoprazole    Tablet 40 milliGRAM(s) Oral before breakfast  polyethylene glycol 3350 17 Gram(s) Oral daily  ursodiol Capsule 300 milliGRAM(s) Oral every 12 hours    MEDICATIONS  (PRN):  acetaminophen   Tablet .. 650 milliGRAM(s) Oral every 6 hours PRN Mild Pain (1 - 3)  dextrose 40% Gel 15 Gram(s) Oral once PRN Blood Glucose LESS THAN 70 milliGRAM(s)/deciliter  glucagon  Injectable 1 milliGRAM(s) IntraMuscular once PRN Glucose LESS THAN 70 milligrams/deciliter  simethicone 80 milliGRAM(s) Chew every 6 hours PRN abdominal bloating      Allergies    No Known Drug Allergies  Purell (Rash)    Intolerances    Vital Signs Last 24 Hrs  T(C): 36.9 (09 Nov 2018 09:45), Max: 36.9 (08 Nov 2018 20:17)  T(F): 98.4 (09 Nov 2018 09:45), Max: 98.4 (08 Nov 2018 20:17)  HR: 71 (09 Nov 2018 09:45) (63 - 71)  BP: 168/78 (09 Nov 2018 09:45) (123/72 - 168/78)  BP(mean): --  RR: 18 (09 Nov 2018 09:45) (17 - 18)  SpO2: 98% (09 Nov 2018 09:45) (95% - 98%)    I&O's Summary    08 Nov 2018 07:01  -  09 Nov 2018 07:00  --------------------------------------------------------  IN: 0 mL / OUT: 30 mL / NET: -30 mL    09 Nov 2018 07:01  -  09 Nov 2018 11:38  --------------------------------------------------------  IN: 360 mL / OUT: 0 mL / NET: 360 mL  PHYSICAL EXAM:  TELE:   Constitutional: NAD, awake and alert, well-developed  HEENT: Moist Mucous Membranes, Anicteric  Pulmonary: Non-labored, breath sounds are clear bilaterally, No wheezing, rales or rhonchi  Cardiovascular: Regular, S1 and S2, No murmurs, rubs, gallops or clicks  Gastrointestinal: Bowel Sounds present, soft, nontender.   Lymph: No peripheral edema. No lymphadenopathy.  Skin: No visible rashes or ulcers.  Psych:  Mood & affect appropriate    LABS: All Labs Reviewed:                        8.0    14.22 )-----------( 394      ( 09 Nov 2018 10:53 )             25.8     09 Nov 2018 10:53    134    |  97     |  60     ----------------------------<  196    4.4     |  29     |  7.40     Ca    7.8        09 Nov 2018 10:53    < from: TTE Echo Doppler w/o Cont (05.04.18 @ 20:56) >     EXAM:  ECHO TTE W/O CON COMP W/DOPPLR        PROCEDURE DATE:  05/04/2018      INTERPRETATION:  Height 165cm. weight 95kg. blood pressure 129/81.   Technician TE. Indication for study dyspnea.    Aortic root 2.3 cm left atrium 4.4 cm left ventricular end-diastolic   diameter 5.7 cm left ventricular end-systolic diameter 4.3 cm septal wall   thickness 1.2 cm posterior wall thickness 1.2 cm visually equivocally   estimated ejection fraction 50-55%.    The aortic root is calcified and of normaldiameter. 3 distinct leaflets   of the aortic valve are not well demonstrated by this study. The   technician was unable to identify any significant gradient across this   valve to suggest hemodynamically significant aortic stenosis. Left atrium   isenlarged. The left ventricle was difficult to visualize by all   standard echocardiographic windows. Possibly the end-diastolic diameter   is mildly increased. The wall thickness is borderline increased. Any   significant focal wall motion abnormality cannot be ascertained by this   study. Visually estimated ejection fraction 50-55%. The mitral annulus is   calcified. The mitral valve leaflets are mildly thickened with a normal   EF slope and excursion. There is no evidence for hemodynamically   significant mitral stenosis. On the very limited color flow Doppler   portion examination mild mitral regurgitation suggested.    Conclusion:  1. Technically difficult limited supine study. Please note that this is a   portable study and the study is also limited due to patient's body   habitus.  2. 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  3. Enlarged left atrium with associated mild mitral regurgitation.  4. Very limited visualization left ventricle which may represent mild   left ventricular concentric hypertrophy with a well-preserved ejection   fraction as described above.  5. A very small posterior pericardial effusion is suggested but not   diagnostic.  6. Correlate these limited findings clinically.    SALVADOR PHILLIPS M.D., ATTENDING CARDIOLOGIST  This document has been electronically signed. May  5 2018  9:47AM      < end of copied text >    < from: Xray Chest 1 View-PORTABLE IMMEDIATE (10.31.18 @ 21:52) >    EXAM:  XR CHEST PORTABLE IMMED 1V                            PROCEDURE DATE:  10/31/2018      INTERPRETATION:  Chest portable.    Clinical History: Nasogastric tube placement.    Comparison: 10/30/2018.    Single AP view submitted.    Patient is rotated.    Nasogastric tube is present, tip below the level of the hemidiaphragm.    Again noted is right-sided dialysis catheter.  The left-sided central venous catheter appears to been removed.    The evaluation of the cardiomediastinal silhouette is limited on portable   technique.    Low lung volumes are present.  Again noted is perihilar airspace consolidation, most pronounced in the   right.  There are probable small bilateral pleural effusions.    Impression:    Findings as discussed above.    NOELLE COLEMAN M.D., ATTENDING RADIOLOGIST  This document has been electronically signed. Nov 1 2018  8:08AM      < end of copied text > [facility] -- Glynn Bullock, Claudia Davis Pannella, Patel, Savella  Office # [phone]    Follow Up:  CAD s/p stents, preop/postop    Subjective/Observations: c/o left flank pain, states, it has been going on for 4days.  However, denies CP, SOB, or orthopnea.  Denies palpitations.    REVIEW OF SYSTEMS: All other review of systems is negative unless indicated above    PAST MEDICAL & SURGICAL HISTORY:  ESRD (end stage renal disease) on dialysis: MWF  CAD (coronary artery disease): s/p stent in 2007  Diabetes  Myocardial infarction  Hypertension  H/O: hysterectomy    MEDICATIONS  (STANDING):  aspirin enteric coated 81 milliGRAM(s) Oral daily  atorvastatin 40 milliGRAM(s) Oral at bedtime  calcium carbonate 1250 mG  + Vitamin D (OsCal 500 + D) 1 Tablet(s) Oral daily  chlorhexidine 2% Cloths 1 Application(s) Topical daily  clopidogrel Tablet 75 milliGRAM(s) Oral daily  dextrose 5%. 1000 milliLiter(s) (50 mL/Hr) IV Continuous <Continuous>  dextrose 50% Injectable 12.5 Gram(s) IV Push once  dextrose 50% Injectable 25 Gram(s) IV Push once  dextrose 50% Injectable 25 Gram(s) IV Push once  docusate sodium 100 milliGRAM(s) Oral three times a day  epoetin analilia Injectable 87114 Unit(s) IV Push <User Schedule>  ferrous    sulfate 325 milliGRAM(s) Oral three times a day  gabapentin 300 milliGRAM(s) Oral daily  heparin  Injectable 5000 Unit(s) SubCutaneous every 8 hours  insulin lispro (HumaLOG) corrective regimen sliding scale   SubCutaneous Before meals and at bedtime  isosorbide   mononitrate ER Tablet (IMDUR) 30 milliGRAM(s) Oral every 24 hours  isosorbide   mononitrate ER Tablet (IMDUR) 60 milliGRAM(s) Oral <User Schedule>  metoprolol tartrate 50 milliGRAM(s) Oral two times a day  NIFEdipine XL 60 milliGRAM(s) Oral daily  pantoprazole    Tablet 40 milliGRAM(s) Oral before breakfast  polyethylene glycol 3350 17 Gram(s) Oral daily  ursodiol Capsule 300 milliGRAM(s) Oral every 12 hours    MEDICATIONS  (PRN):  acetaminophen   Tablet .. 650 milliGRAM(s) Oral every 6 hours PRN Mild Pain (1 - 3)  dextrose 40% Gel 15 Gram(s) Oral once PRN Blood Glucose LESS THAN 70 milliGRAM(s)/deciliter  glucagon  Injectable 1 milliGRAM(s) IntraMuscular once PRN Glucose LESS THAN 70 milligrams/deciliter  simethicone 80 milliGRAM(s) Chew every 6 hours PRN abdominal bloating    Allergies    No Known Drug Allergies  Purell (Rash)    Intolerances    Vital Signs Last 24 Hrs  T(C): 36.9 (09 Nov 2018 09:45), Max: 36.9 (08 Nov 2018 20:17)  T(F): 98.4 (09 Nov 2018 09:45), Max: 98.4 (08 Nov 2018 20:17)  HR: 71 (09 Nov 2018 09:45) (63 - 71)  BP: 168/78 (09 Nov 2018 09:45) (123/72 - 168/78)  BP(mean): --  RR: 18 (09 Nov 2018 09:45) (17 - 18)  SpO2: 98% (09 Nov 2018 09:45) (95% - 98%)    I&O's Summary    08 Nov 2018 07:01  -  09 Nov 2018 07:00  --------------------------------------------------------  IN: 0 mL / OUT: 30 mL / NET: -30 mL    09 Nov 2018 07:01  -  09 Nov 2018 11:38  --------------------------------------------------------  IN: 360 mL / OUT: 0 mL / NET: 360 mL  PHYSICAL EXAM:  TELE:   Constitutional: NAD, awake and alert, obese  HEENT: Moist Mucous Membranes, Anicteric  Pulmonary: Non-labored, breath sounds are clear bilaterally, No wheezing, rales or rhonchi  Cardiovascular: Irregularly irregular, S1 and S2,  +murmurs, rubs, gallops or clicks  Gastrointestinal: Bowel Sounds present, soft, nontender.   Lymph: No peripheral edema. No lymphadenopathy.  Skin: No visible rashes or ulcers.  Psych:  Mood & affect flat    LABS: All Labs Reviewed:                        8.0    14.22 )-----------( 394      ( 09 Nov 2018 10:53 )             25.8     09 Nov 2018 10:53    134    |  97     |  60     ----------------------------<  196    4.4     |  29     |  7.40     Ca    7.8        09 Nov 2018 10:53    < from: TTE Echo Doppler w/o Cont (05.04.18 @ 20:56) >     EXAM:  ECHO TTE W/O CON COMP W/DOPPLR        PROCEDURE DATE:  05/04/2018      INTERPRETATION:  Height 165cm. weight 95kg. blood pressure 129/81.   Technician TE. Indication for study dyspnea.    Aortic root 2.3 cm left atrium 4.4 cm left ventricular end-diastolic   diameter 5.7 cm left ventricular end-systolic diameter 4.3 cm septal wall   thickness 1.2 cm posterior wall thickness 1.2 cm visually equivocally   estimated ejection fraction 50-55%.    The aortic root is calcified and of normaldiameter. 3 distinct leaflets   of the aortic valve are not well demonstrated by this study. The   technician was unable to identify any significant gradient across this   valve to suggest hemodynamically significant aortic stenosis. Left atrium   isenlarged. The left ventricle was difficult to visualize by all   standard echocardiographic windows. Possibly the end-diastolic diameter   is mildly increased. The wall thickness is borderline increased. Any   significant focal wall motion abnormality cannot be ascertained by this   study. Visually estimated ejection fraction 50-55%. The mitral annulus is   calcified. The mitral valve leaflets are mildly thickened with a normal   EF slope and excursion. There is no evidence for hemodynamically   significant mitral stenosis. On the very limited color flow Doppler   portion examination mild mitral regurgitation suggested.    Conclusion:  1. Technically difficult limited supine study. Please note that this is a   portable study and the study is also limited due to patient's body   habitus.  2. 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  3. Enlarged left atrium with associated mild mitral regurgitation.  4. Very limited visualization left ventricle which may represent mild   left ventricular concentric hypertrophy with a well-preserved ejection   fraction as described above.  5. A very small posterior pericardial effusion is suggested but not   diagnostic.  6. Correlate these limited findings clinically.    SALVADOR PHILLIPS M.D., ATTENDING CARDIOLOGIST  This document has been electronically signed. May  5 2018  9:47AM      < end of copied text >    < from: Xray Chest 1 View-PORTABLE IMMEDIATE (10.31.18 @ 21:52) >    EXAM:  XR CHEST PORTABLE IMMED 1V                            PROCEDURE DATE:  10/31/2018      INTERPRETATION:  Chest portable.    Clinical History: Nasogastric tube placement.    Comparison: 10/30/2018.    Single AP view submitted.    Patient is rotated.    Nasogastric tube is present, tip below the level of the hemidiaphragm.    Again noted is right-sided dialysis catheter.  The left-sided central venous catheter appears to been removed.    The evaluation of the cardiomediastinal silhouette is limited on portable   technique.    Low lung volumes are present.  Again noted is perihilar airspace consolidation, most pronounced in the   right.  There are probable small bilateral pleural effusions.    Impression:    Findings as discussed above.    NOELLE COLEMAN M.D., ATTENDING RADIOLOGIST  This document has been electronically signed. Nov 1 2018  8:08AM      < end of copied text >

## 2018-11-09 NOTE — PROGRESS NOTE ADULT - ASSESSMENT
68 female with a history of HTN, CAD, CHF, DM, PVD, ESRD on HD now admitted with fever, chills and found to have gall bladder sepsis. ESRD on HD, stable dialysis via the right perma cath. Labs and medications reviewed. Discharge planning in progress.

## 2018-11-09 NOTE — PROGRESS NOTE ADULT - PROBLEM SELECTOR PLAN 2
secondary to hospital induced psychosis  was placed one to one observation, now mentation improved  will placed on enhanced supervision

## 2018-11-09 NOTE — PROGRESS NOTE ADULT - PROBLEM SELECTOR PLAN 1
improved  s/p perc c-tube  repeat blood/bile fluid cxs ngtd  sp abx  as per surgery, cleared for dc  id following  monitor abd exam  diet as tolerated  would check ammonia level  dc planning per primary team

## 2018-11-09 NOTE — CHART NOTE - NSCHARTNOTEFT_GEN_A_CORE
Called by RN because patient is climbing out of bed and confused. Pt seen and examined at bedside. Pt Alert and oriented x 3, but not to situation. States she shouldn't be in the hospital and should go home with her family. Pt is can be redirected. Ordered constant observation. Will continue to follow. RN to call with status changes.

## 2018-11-10 LAB — AMMONIA BLD-MCNC: 30 UMOL/L — SIGNIFICANT CHANGE UP (ref 11–32)

## 2018-11-10 PROCEDURE — 99233 SBSQ HOSP IP/OBS HIGH 50: CPT

## 2018-11-10 PROCEDURE — 99232 SBSQ HOSP IP/OBS MODERATE 35: CPT

## 2018-11-10 RX ADMIN — URSODIOL 300 MILLIGRAM(S): 250 TABLET, FILM COATED ORAL at 05:19

## 2018-11-10 RX ADMIN — GABAPENTIN 300 MILLIGRAM(S): 400 CAPSULE ORAL at 12:53

## 2018-11-10 RX ADMIN — ISOSORBIDE MONONITRATE 30 MILLIGRAM(S): 60 TABLET, EXTENDED RELEASE ORAL at 18:12

## 2018-11-10 RX ADMIN — Medication 90 MILLIGRAM(S): at 05:20

## 2018-11-10 RX ADMIN — CHLORHEXIDINE GLUCONATE 1 APPLICATION(S): 213 SOLUTION TOPICAL at 12:57

## 2018-11-10 RX ADMIN — Medication 6: at 12:54

## 2018-11-10 RX ADMIN — URSODIOL 300 MILLIGRAM(S): 250 TABLET, FILM COATED ORAL at 18:12

## 2018-11-10 RX ADMIN — Medication 325 MILLIGRAM(S): at 05:20

## 2018-11-10 RX ADMIN — Medication 4: at 08:22

## 2018-11-10 RX ADMIN — HEPARIN SODIUM 5000 UNIT(S): 5000 INJECTION INTRAVENOUS; SUBCUTANEOUS at 22:00

## 2018-11-10 RX ADMIN — CLOPIDOGREL BISULFATE 75 MILLIGRAM(S): 75 TABLET, FILM COATED ORAL at 12:53

## 2018-11-10 RX ADMIN — POLYETHYLENE GLYCOL 3350 17 GRAM(S): 17 POWDER, FOR SOLUTION ORAL at 12:53

## 2018-11-10 RX ADMIN — ATORVASTATIN CALCIUM 40 MILLIGRAM(S): 80 TABLET, FILM COATED ORAL at 22:00

## 2018-11-10 RX ADMIN — Medication 325 MILLIGRAM(S): at 22:00

## 2018-11-10 RX ADMIN — PANTOPRAZOLE SODIUM 40 MILLIGRAM(S): 20 TABLET, DELAYED RELEASE ORAL at 06:53

## 2018-11-10 RX ADMIN — Medication 50 MILLIGRAM(S): at 18:13

## 2018-11-10 RX ADMIN — Medication 81 MILLIGRAM(S): at 12:53

## 2018-11-10 RX ADMIN — Medication 100 MILLIGRAM(S): at 05:19

## 2018-11-10 RX ADMIN — Medication 100 MILLIGRAM(S): at 22:00

## 2018-11-10 RX ADMIN — HEPARIN SODIUM 5000 UNIT(S): 5000 INJECTION INTRAVENOUS; SUBCUTANEOUS at 05:22

## 2018-11-10 RX ADMIN — HEPARIN SODIUM 5000 UNIT(S): 5000 INJECTION INTRAVENOUS; SUBCUTANEOUS at 13:00

## 2018-11-10 RX ADMIN — Medication 1 TABLET(S): at 12:53

## 2018-11-10 RX ADMIN — Medication 325 MILLIGRAM(S): at 13:00

## 2018-11-10 RX ADMIN — Medication 100 MILLIGRAM(S): at 13:00

## 2018-11-10 RX ADMIN — Medication 4: at 22:00

## 2018-11-10 RX ADMIN — ISOSORBIDE MONONITRATE 60 MILLIGRAM(S): 60 TABLET, EXTENDED RELEASE ORAL at 05:19

## 2018-11-10 RX ADMIN — Medication 50 MILLIGRAM(S): at 05:19

## 2018-11-10 NOTE — PROGRESS NOTE ADULT - PROBLEM SELECTOR PLAN 3
Ac cholecystitis with gall stones, cholangitis and choledocholithiasis  S/P P/C cholecystoscopy and drainage   S/P MRCP no stone in CBD, FU GI,   improving in AST/ALT, c/w ursodiol.  fluid cx + for  E.coli switched to Ertapenem. ID  f/u  would need IR tube study follow up in 2 weeks  to complete invanz abx in am, and d/c home with homecare Ac cholecystitis with gall stones, cholangitis and choledocholithiasis  S/P P/C cholecystoscopy and drainage   S/P MRCP no stone in CBD, FU GI,   improving in AST/ALT, c/w ursodiol.  fluid cx + for  E.coli switched to Ertapenem. ID  f/u  c-tube in place  completed invanz course, awaiting rehab placement

## 2018-11-10 NOTE — PROGRESS NOTE ADULT - SUBJECTIVE AND OBJECTIVE BOX
pt seen  feeling good  no issues  ICU Vital Signs Last 24 Hrs  T(C): 36.8 (10 Nov 2018 05:13), Max: 36.8 (09 Nov 2018 14:38)  T(F): 98.2 (10 Nov 2018 05:13), Max: 98.3 (09 Nov 2018 14:38)  HR: 81 (10 Nov 2018 05:13) (72 - 81)  BP: 166/85 (10 Nov 2018 05:13) (144/70 - 188/72)  BP(mean): --  ABP: --  ABP(mean): --  RR: 18 (10 Nov 2018 05:13) (16 - 18)  SpO2: 97% (10 Nov 2018 05:13) (94% - 100%)  gen-NAD  resp-clear  abd-soft NT/ND                          8.0    14.22 )-----------( 394      ( 09 Nov 2018 10:53 )             25.8   11-09    134<L>  |  97  |  60<H>  ----------------------------<  196<H>  4.4   |  29  |  7.40<H>    Ca    7.8<L>      09 Nov 2018 10:53

## 2018-11-10 NOTE — PROGRESS NOTE ADULT - SUBJECTIVE AND OBJECTIVE BOX
[facility] - Glynn Bullock, Susan, Claudia, Flaquita, Terry Mora  Office Number:  [phone]    Patient resting comfortably in bed in NAD.  Agitated overnight. 1:1 in place.  Cannot obtain interval history    ROS: negative unless otherwise mentioned.    Telemetry:  not on tele    MEDICATIONS  (STANDING):  aspirin enteric coated 81 milliGRAM(s) Oral daily  atorvastatin 40 milliGRAM(s) Oral at bedtime  calcium carbonate 1250 mG  + Vitamin D (OsCal 500 + D) 1 Tablet(s) Oral daily  chlorhexidine 2% Cloths 1 Application(s) Topical daily  clopidogrel Tablet 75 milliGRAM(s) Oral daily  dextrose 5%. 1000 milliLiter(s) (50 mL/Hr) IV Continuous <Continuous>  dextrose 50% Injectable 12.5 Gram(s) IV Push once  dextrose 50% Injectable 25 Gram(s) IV Push once  dextrose 50% Injectable 25 Gram(s) IV Push once  docusate sodium 100 milliGRAM(s) Oral three times a day  epoetin analilia Injectable 32596 Unit(s) IV Push <User Schedule>  ferrous    sulfate 325 milliGRAM(s) Oral three times a day  gabapentin 300 milliGRAM(s) Oral daily  heparin  Injectable 5000 Unit(s) SubCutaneous every 8 hours  insulin lispro (HumaLOG) corrective regimen sliding scale   SubCutaneous Before meals and at bedtime  isosorbide   mononitrate ER Tablet (IMDUR) 30 milliGRAM(s) Oral every 24 hours  isosorbide   mononitrate ER Tablet (IMDUR) 60 milliGRAM(s) Oral <User Schedule>  metoprolol tartrate 50 milliGRAM(s) Oral two times a day  NIFEdipine XL 90 milliGRAM(s) Oral daily  pantoprazole    Tablet 40 milliGRAM(s) Oral before breakfast  polyethylene glycol 3350 17 Gram(s) Oral daily  ursodiol Capsule 300 milliGRAM(s) Oral every 12 hours    MEDICATIONS  (PRN):  acetaminophen   Tablet .. 650 milliGRAM(s) Oral every 6 hours PRN Mild Pain (1 - 3)  dextrose 40% Gel 15 Gram(s) Oral once PRN Blood Glucose LESS THAN 70 milliGRAM(s)/deciliter  glucagon  Injectable 1 milliGRAM(s) IntraMuscular once PRN Glucose LESS THAN 70 milligrams/deciliter  simethicone 80 milliGRAM(s) Chew every 6 hours PRN abdominal bloating      Allergies    No Known Drug Allergies  Purell (Rash)    Intolerances        Vital Signs Last 24 Hrs  T(C): 36.8 (10 Nov 2018 05:13), Max: 36.9 (09 Nov 2018 09:45)  T(F): 98.2 (10 Nov 2018 05:13), Max: 98.4 (09 Nov 2018 09:45)  HR: 81 (10 Nov 2018 05:13) (71 - 81)  BP: 166/85 (10 Nov 2018 05:13) (144/70 - 188/72)  BP(mean): --  RR: 18 (10 Nov 2018 05:13) (16 - 18)  SpO2: 97% (10 Nov 2018 05:13) (94% - 100%)    I&O's Summary    09 Nov 2018 07:01  -  10 Nov 2018 07:00  --------------------------------------------------------  IN: 1360 mL / OUT: 2900 mL / NET: -1540 mL        ON EXAM:    Constitutional: NAD, obese, agitated with 1:12  HEENT: Moist Mucous Membranes, Anicteric  Pulmonary: Non-labored, coarse bs bilaterally  Cardiovascular: Irregularly irregular, S1 and S2,  +murmur, no rubs, gallops or clicks  Gastrointestinal: Bowel Sounds present, soft, nontender.   Lymph: No peripheral edema. No lymphadenopathy.  Skin: No visible rashes or ulcers.  Psych:  Mood & affect flat    LABS: All Labs Reviewed:                        8.0    14.22 )-----------( 394      ( 09 Nov 2018 10:53 )             25.8     09 Nov 2018 10:53    134    |  97     |  60     ----------------------------<  196    4.4     |  29     |  7.40     Ca    7.8        09 Nov 2018 10:53            Blood Culture:

## 2018-11-10 NOTE — PROGRESS NOTE ADULT - SUBJECTIVE AND OBJECTIVE BOX
Patient is a 68y old  Female who presents with a chief complaint of Sepsis (10 Nov 2018 12:33)      INTERVAL HPI: Pt seen and examined. Aide at bedside, pt appears somnolent and lethargic this AM, per RN pt has been restless.    OVERNIGHT EVENTS: none examined  T(F): 98.2 (11-10-18 @ 05:13), Max: 98.3 (11-09-18 @ 23:20)  HR: 81 (11-10-18 @ 05:13) (72 - 81)  BP: 166/85 (11-10-18 @ 05:13) (165/67 - 185/70)  RR: 18 (11-10-18 @ 05:13) (18 - 18)  SpO2: 97% (11-10-18 @ 05:13) (94% - 97%)  I&O's Summary    09 Nov 2018 07:01  -  10 Nov 2018 07:00  --------------------------------------------------------  IN: 1360 mL / OUT: 2900 mL / NET: -1540 mL        REVIEW OF SYSTEMS: unable as pt lethargic, arousable but sleepy    PHYSICAL EXAM:  GENERAL: NAD, elder, obese  NERVOUS SYSTEM:  Alert & Oriented X1, lethargic,  Motor Strength 3/5 B/L upper and lower extremities  CHEST/LUNG: Clear to percussion bilaterally; No rales, rhonchi, wheezing, or rubs  HEART: Regular rate and rhythm; No murmurs, rubs, or gallops  ABDOMEN: Soft, Nontender, Nondistended; Bowel sounds present  EXTREMITIES:  2+ Peripheral Pulses, No clubbing, cyanosis, or edema  SKIN: No rashes or lesions    LABS:                        8.0    14.22 )-----------( 394      ( 09 Nov 2018 10:53 )             25.8     11-09    134<L>  |  97  |  60<H>  ----------------------------<  196<H>  4.4   |  29  |  7.40<H>    Ca    7.8<L>      09 Nov 2018 10:53          CAPILLARY BLOOD GLUCOSE      POCT Blood Glucose.: 262 mg/dL (10 Nov 2018 11:59)  POCT Blood Glucose.: 212 mg/dL (10 Nov 2018 08:04)  POCT Blood Glucose.: 229 mg/dL (09 Nov 2018 23:18)  POCT Blood Glucose.: 246 mg/dL (09 Nov 2018 21:46)  POCT Blood Glucose.: 148 mg/dL (09 Nov 2018 16:57)              MEDICATIONS  (STANDING):  aspirin enteric coated 81 milliGRAM(s) Oral daily  atorvastatin 40 milliGRAM(s) Oral at bedtime  calcium carbonate 1250 mG  + Vitamin D (OsCal 500 + D) 1 Tablet(s) Oral daily  chlorhexidine 2% Cloths 1 Application(s) Topical daily  clopidogrel Tablet 75 milliGRAM(s) Oral daily  dextrose 5%. 1000 milliLiter(s) (50 mL/Hr) IV Continuous <Continuous>  dextrose 50% Injectable 12.5 Gram(s) IV Push once  dextrose 50% Injectable 25 Gram(s) IV Push once  dextrose 50% Injectable 25 Gram(s) IV Push once  docusate sodium 100 milliGRAM(s) Oral three times a day  epoetin analilia Injectable 26401 Unit(s) IV Push <User Schedule>  ferrous    sulfate 325 milliGRAM(s) Oral three times a day  gabapentin 300 milliGRAM(s) Oral daily  heparin  Injectable 5000 Unit(s) SubCutaneous every 8 hours  insulin lispro (HumaLOG) corrective regimen sliding scale   SubCutaneous Before meals and at bedtime  isosorbide   mononitrate ER Tablet (IMDUR) 30 milliGRAM(s) Oral every 24 hours  isosorbide   mononitrate ER Tablet (IMDUR) 60 milliGRAM(s) Oral <User Schedule>  metoprolol tartrate 50 milliGRAM(s) Oral two times a day  NIFEdipine XL 90 milliGRAM(s) Oral daily  pantoprazole    Tablet 40 milliGRAM(s) Oral before breakfast  polyethylene glycol 3350 17 Gram(s) Oral daily  ursodiol Capsule 300 milliGRAM(s) Oral every 12 hours    MEDICATIONS  (PRN):  acetaminophen   Tablet .. 650 milliGRAM(s) Oral every 6 hours PRN Mild Pain (1 - 3)  dextrose 40% Gel 15 Gram(s) Oral once PRN Blood Glucose LESS THAN 70 milliGRAM(s)/deciliter  glucagon  Injectable 1 milliGRAM(s) IntraMuscular once PRN Glucose LESS THAN 70 milligrams/deciliter  simethicone 80 milliGRAM(s) Chew every 6 hours PRN abdominal bloating

## 2018-11-10 NOTE — PROGRESS NOTE ADULT - SUBJECTIVE AND OBJECTIVE BOX
INTERVAL HPI/OVERNIGHT EVENTS:  pt seen and examined   confused this am, tolerating diet and + bm   afebrile overnight cbc noted    MEDICATIONS  (STANDING):  aspirin enteric coated 81 milliGRAM(s) Oral daily  atorvastatin 40 milliGRAM(s) Oral at bedtime  calcium carbonate 1250 mG  + Vitamin D (OsCal 500 + D) 1 Tablet(s) Oral daily  chlorhexidine 2% Cloths 1 Application(s) Topical daily  clopidogrel Tablet 75 milliGRAM(s) Oral daily  dextrose 5%. 1000 milliLiter(s) (50 mL/Hr) IV Continuous <Continuous>  dextrose 50% Injectable 12.5 Gram(s) IV Push once  dextrose 50% Injectable 25 Gram(s) IV Push once  dextrose 50% Injectable 25 Gram(s) IV Push once  docusate sodium 100 milliGRAM(s) Oral three times a day  epoetin analilia Injectable 86306 Unit(s) IV Push <User Schedule>  ferrous    sulfate 325 milliGRAM(s) Oral three times a day  gabapentin 300 milliGRAM(s) Oral daily  heparin  Injectable 5000 Unit(s) SubCutaneous every 8 hours  insulin lispro (HumaLOG) corrective regimen sliding scale   SubCutaneous Before meals and at bedtime  isosorbide   mononitrate ER Tablet (IMDUR) 30 milliGRAM(s) Oral every 24 hours  isosorbide   mononitrate ER Tablet (IMDUR) 60 milliGRAM(s) Oral <User Schedule>  metoprolol tartrate 50 milliGRAM(s) Oral two times a day  NIFEdipine XL 60 milliGRAM(s) Oral daily  pantoprazole    Tablet 40 milliGRAM(s) Oral before breakfast  polyethylene glycol 3350 17 Gram(s) Oral daily  ursodiol Capsule 300 milliGRAM(s) Oral every 12 hours    MEDICATIONS  (PRN):  acetaminophen   Tablet .. 650 milliGRAM(s) Oral every 6 hours PRN Mild Pain (1 - 3)  dextrose 40% Gel 15 Gram(s) Oral once PRN Blood Glucose LESS THAN 70 milliGRAM(s)/deciliter  glucagon  Injectable 1 milliGRAM(s) IntraMuscular once PRN Glucose LESS THAN 70 milligrams/deciliter  simethicone 80 milliGRAM(s) Chew every 6 hours PRN abdominal bloating      Allergies    No Known Drug Allergies  Purell (Rash)    Intolerances        Review of Systems:    unable to obtain      Vital Signs Last 24 Hrs  T(C): 36.8 (10 Nov 2018 05:13), Max: 36.8 (09 Nov 2018 14:38)  T(F): 98.2 (10 Nov 2018 05:13), Max: 98.3 (09 Nov 2018 14:38)  HR: 81 (10 Nov 2018 05:13) (72 - 81)  BP: 166/85 (10 Nov 2018 05:13) (144/70 - 188/72)  BP(mean): --  RR: 18 (10 Nov 2018 05:13) (16 - 18)  SpO2: 97% (10 Nov 2018 05:13) (94% - 100%)    PHYSICAL EXAM:  Constitutional: NAD, lying in bed  HEENT: ncat  Neck: No LAD  Respiratory: dec bs  Cardiovascular: S1 and S2, RRR  Gastrointestinal: soft nt mild dt perc kevin tube in place w minimal output  Extremities: no edema  Vascular: 2+ peripheral pulses  Neurological: awake but confused  Skin: No rashes      LABS:                                      8.0    14.22 )-----------( 394      ( 09 Nov 2018 10:53 )             25.8   11-09    134<L>  |  97  |  60<H>  ----------------------------<  196<H>  4.4   |  29  |  7.40<H>    Ca    7.8<L>      09 Nov 2018 10:53                RADIOLOGY & ADDITIONAL TESTS:

## 2018-11-10 NOTE — PROGRESS NOTE ADULT - ASSESSMENT
Patient is now  status post cholecystotomy tube for biliary sepsis on 10/30.  She had rapid atrial fibrillation on 10/30 and converted to sinus rhythm. She required pressor therapy which has now been discontinued. Her white blood count improved today remains hemodynamically stable. She has chronic renal failure on hemodialysis. Her elevated troponin level may represent a component of subendocardial ischemia without true atherothrombosis. These results are nonspecific in the setting of her kidney disease. Currently afebrile and arousable with no evidence of decompensated heart failure or active ischemic. She remains agitated.    Recommend:  - s/p fall with no overt injuries.  Fall precautions  - cont dual antiplatelet therapy for now for CAD s/p stents  - cont statin Rx  - cont metoprolol  - cont isosorbide mononitrate  - HD per Renal  - Increase nifedipine for HTN  - DVT ppx    Will continue to follow while inpatient

## 2018-11-10 NOTE — PROGRESS NOTE ADULT - ASSESSMENT
s/p percutaneous cholecystostomy tube. Doing well     cont abx     cleared for dc from gen surg standpoint

## 2018-11-10 NOTE — PROGRESS NOTE ADULT - PROBLEM SELECTOR PLAN 2
secondary to hospital induced psychosis  on enhanced supervision  will placed on enhanced supervision improving, lethargic  neuro checks  secondary to hospital induced psychosis  on enhanced supervision  will placed on enhanced supervision

## 2018-11-11 LAB
INR BLD: 1.21 RATIO — HIGH (ref 0.88–1.16)
PROTHROM AB SERPL-ACNC: 13.9 SEC — HIGH (ref 10–12.9)

## 2018-11-11 PROCEDURE — 99233 SBSQ HOSP IP/OBS HIGH 50: CPT

## 2018-11-11 PROCEDURE — 99232 SBSQ HOSP IP/OBS MODERATE 35: CPT

## 2018-11-11 RX ADMIN — Medication 50 MILLIGRAM(S): at 17:49

## 2018-11-11 RX ADMIN — POLYETHYLENE GLYCOL 3350 17 GRAM(S): 17 POWDER, FOR SOLUTION ORAL at 13:10

## 2018-11-11 RX ADMIN — PANTOPRAZOLE SODIUM 40 MILLIGRAM(S): 20 TABLET, DELAYED RELEASE ORAL at 05:55

## 2018-11-11 RX ADMIN — ATORVASTATIN CALCIUM 40 MILLIGRAM(S): 80 TABLET, FILM COATED ORAL at 21:34

## 2018-11-11 RX ADMIN — Medication 50 MILLIGRAM(S): at 05:28

## 2018-11-11 RX ADMIN — Medication 81 MILLIGRAM(S): at 13:05

## 2018-11-11 RX ADMIN — HEPARIN SODIUM 5000 UNIT(S): 5000 INJECTION INTRAVENOUS; SUBCUTANEOUS at 13:15

## 2018-11-11 RX ADMIN — Medication 6: at 17:47

## 2018-11-11 RX ADMIN — URSODIOL 300 MILLIGRAM(S): 250 TABLET, FILM COATED ORAL at 17:49

## 2018-11-11 RX ADMIN — CLOPIDOGREL BISULFATE 75 MILLIGRAM(S): 75 TABLET, FILM COATED ORAL at 13:10

## 2018-11-11 RX ADMIN — HEPARIN SODIUM 5000 UNIT(S): 5000 INJECTION INTRAVENOUS; SUBCUTANEOUS at 05:29

## 2018-11-11 RX ADMIN — ISOSORBIDE MONONITRATE 60 MILLIGRAM(S): 60 TABLET, EXTENDED RELEASE ORAL at 05:28

## 2018-11-11 RX ADMIN — Medication 100 MILLIGRAM(S): at 05:28

## 2018-11-11 RX ADMIN — Medication 325 MILLIGRAM(S): at 05:28

## 2018-11-11 RX ADMIN — Medication 325 MILLIGRAM(S): at 13:15

## 2018-11-11 RX ADMIN — Medication 325 MILLIGRAM(S): at 21:34

## 2018-11-11 RX ADMIN — Medication 100 MILLIGRAM(S): at 21:34

## 2018-11-11 RX ADMIN — URSODIOL 300 MILLIGRAM(S): 250 TABLET, FILM COATED ORAL at 05:28

## 2018-11-11 RX ADMIN — Medication 4: at 08:03

## 2018-11-11 RX ADMIN — Medication 100 MILLIGRAM(S): at 13:15

## 2018-11-11 RX ADMIN — CHLORHEXIDINE GLUCONATE 1 APPLICATION(S): 213 SOLUTION TOPICAL at 13:11

## 2018-11-11 RX ADMIN — Medication 90 MILLIGRAM(S): at 05:29

## 2018-11-11 RX ADMIN — Medication 2: at 13:04

## 2018-11-11 RX ADMIN — GABAPENTIN 300 MILLIGRAM(S): 400 CAPSULE ORAL at 13:10

## 2018-11-11 RX ADMIN — ISOSORBIDE MONONITRATE 30 MILLIGRAM(S): 60 TABLET, EXTENDED RELEASE ORAL at 17:49

## 2018-11-11 RX ADMIN — Medication 2: at 21:34

## 2018-11-11 RX ADMIN — Medication 1 TABLET(S): at 13:05

## 2018-11-11 NOTE — PROGRESS NOTE ADULT - PROBLEM SELECTOR PLAN 2
improving, lethargic  neuro checks  secondary to hospital induced psychosis  on enhanced supervision  will placed on enhanced supervision

## 2018-11-11 NOTE — PROGRESS NOTE ADULT - SUBJECTIVE AND OBJECTIVE BOX
Patient is a 68y old  Female who presents with a chief complaint of Sepsis (11 Nov 2018 11:12)      INTERVAL HPI: Pt seen and examined. More awake and alert today, used  services #[id number]. Pt states she is feeling very weak but abd pain is improved. States she initially did not understand why tube was placed but after this writer explained she now understands and she is willing to undergo further care and evaluation, understands once medical managment has been accomplished from an inpatient setting she will require rehab for strengthening. Denies any other acute complaints at this time.     OVERNIGHT EVENTS: none noted  T(F): 97.9 (11-11-18 @ 14:20), Max: 98.2 (11-11-18 @ 04:14)  HR: 66 (11-11-18 @ 14:20) (66 - 72)  BP: 134/63 (11-11-18 @ 14:20) (134/62 - 173/84)  RR: 18 (11-11-18 @ 14:20) (16 - 18)  SpO2: 94% (11-11-18 @ 14:20) (94% - 96%)  I&O's Summary    10 Nov 2018 07:01  -  11 Nov 2018 07:00  --------------------------------------------------------  IN: 120 mL / OUT: 0 mL / NET: 120 mL        REVIEW OF SYSTEMS:  CONSTITUTIONAL: No fever, weight loss, +fatigue  RESPIRATORY: No cough, wheezing, chills or hemoptysis; No shortness of breath  CARDIOVASCULAR: No chest pain, palpitations, dizziness, or leg swelling  GASTROINTESTINAL: No abdominal or epigastric pain. No nausea, vomiting, or hematemesis; No diarrhea or constipation. No melena or hematochezia.  GENITOURINARY: No dysuria, frequency, hematuria, or incontinence  NEUROLOGICAL: No headaches, memory loss, loss of strength, numbness, or tremors  SKIN: No itching, burning, rashes, or lesions   MUSCULOSKELETAL: No joint pain or swelling; No muscle, back, or extremity pain  PSYCHIATRIC: No depression, anxiety, mood swings, or difficulty sleeping      PHYSICAL EXAM:  GENERAL: NAD, well-groomed, well-developed, elder, obese  HEAD:  Atraumatic, Normocephalic  EYES: EOMI, PERRLA, conjunctiva and sclera clear  ENMT: No tonsillar erythema, exudates, or enlargement; Moist mucous membranes, Good dentition, No lesions  NECK: Supple, No JVD,  NERVOUS SYSTEM:  Alert & Oriented X3, Good concentration; Motor Strength 4/5 B/L upper and lower extremities  CHEST/LUNG: Clear to percussion bilaterally; No rales, rhonchi, wheezing, or rubs  HEART: Regular rate and rhythm; No murmurs, rubs, or gallops  ABDOMEN: Soft, Nontender, Nondistended; Bowel sounds present  EXTREMITIES:  2+ Peripheral Pulses, No clubbing, cyanosis, or edema  SKIN: No rashes or lesions    LABS:          PT/INR - ( 11 Nov 2018 13:43 )   PT: 13.9 sec;   INR: 1.21 ratio             CAPILLARY BLOOD GLUCOSE      POCT Blood Glucose.: 161 mg/dL (11 Nov 2018 13:02)  POCT Blood Glucose.: 181 mg/dL (11 Nov 2018 11:13)  POCT Blood Glucose.: 233 mg/dL (11 Nov 2018 08:00)  POCT Blood Glucose.: 211 mg/dL (10 Nov 2018 21:44)  POCT Blood Glucose.: 133 mg/dL (10 Nov 2018 17:07)              MEDICATIONS  (STANDING):  aspirin enteric coated 81 milliGRAM(s) Oral daily  atorvastatin 40 milliGRAM(s) Oral at bedtime  calcium carbonate 1250 mG  + Vitamin D (OsCal 500 + D) 1 Tablet(s) Oral daily  chlorhexidine 2% Cloths 1 Application(s) Topical daily  clopidogrel Tablet 75 milliGRAM(s) Oral daily  dextrose 5%. 1000 milliLiter(s) (50 mL/Hr) IV Continuous <Continuous>  dextrose 50% Injectable 12.5 Gram(s) IV Push once  dextrose 50% Injectable 25 Gram(s) IV Push once  dextrose 50% Injectable 25 Gram(s) IV Push once  docusate sodium 100 milliGRAM(s) Oral three times a day  epoetin analilia Injectable 83366 Unit(s) IV Push <User Schedule>  ferrous    sulfate 325 milliGRAM(s) Oral three times a day  gabapentin 300 milliGRAM(s) Oral daily  heparin  Injectable 5000 Unit(s) SubCutaneous every 8 hours  insulin lispro (HumaLOG) corrective regimen sliding scale   SubCutaneous Before meals and at bedtime  isosorbide   mononitrate ER Tablet (IMDUR) 30 milliGRAM(s) Oral every 24 hours  isosorbide   mononitrate ER Tablet (IMDUR) 60 milliGRAM(s) Oral <User Schedule>  metoprolol tartrate 50 milliGRAM(s) Oral two times a day  NIFEdipine XL 90 milliGRAM(s) Oral daily  pantoprazole    Tablet 40 milliGRAM(s) Oral before breakfast  polyethylene glycol 3350 17 Gram(s) Oral daily  ursodiol Capsule 300 milliGRAM(s) Oral every 12 hours    MEDICATIONS  (PRN):  acetaminophen   Tablet .. 650 milliGRAM(s) Oral every 6 hours PRN Mild Pain (1 - 3)  dextrose 40% Gel 15 Gram(s) Oral once PRN Blood Glucose LESS THAN 70 milliGRAM(s)/deciliter  glucagon  Injectable 1 milliGRAM(s) IntraMuscular once PRN Glucose LESS THAN 70 milligrams/deciliter  simethicone 80 milliGRAM(s) Chew every 6 hours PRN abdominal bloating

## 2018-11-11 NOTE — PROGRESS NOTE ADULT - PROBLEM SELECTOR PLAN 3
Ac cholecystitis with gall stones, cholangitis and choledocholithiasis  S/P P/C cholecystoscopy and drainage, will check with IR to verify proper placement and drainage as per GI recs  S/P MRCP no stone in CBD, FU GI,   improving in AST/ALT, c/w ursodiol.  fluid cx + for  E.coli switched to Ertapenem. ID  f/u  c-tube in place  completed invanz course, awaiting rehab placement

## 2018-11-11 NOTE — PROGRESS NOTE ADULT - ASSESSMENT
67 yo s/p perc cholecystostomy tube.  Doing better     cont drain     cleared for d/c from gen surg standpoint

## 2018-11-11 NOTE — PROGRESS NOTE ADULT - PROBLEM SELECTOR PLAN 1
2/2 acute calculus cholecystitis with biliary dilatation with common bile duct stone.   Blood Cx + for ESBL. E.coli   completed course of iv antibx of ertapenem  family wants pt to go to rehab  apprec SW note awaiting authorization from insurance, check C tube  via IR before discharge

## 2018-11-11 NOTE — PROGRESS NOTE ADULT - PROBLEM SELECTOR PLAN 7
tyoe 2 stable  -c/w lantus and low dose insulin sliding scale with hypoglycemic protocol  - accuchecks  -HgA1c 9.7

## 2018-11-11 NOTE — PROGRESS NOTE ADULT - SUBJECTIVE AND OBJECTIVE BOX
INTERVAL HPI/OVERNIGHT EVENTS:  pt seen and examined   confused this am remains on 1:1 , tolerating diet and + bm       MEDICATIONS  (STANDING):  aspirin enteric coated 81 milliGRAM(s) Oral daily  atorvastatin 40 milliGRAM(s) Oral at bedtime  calcium carbonate 1250 mG  + Vitamin D (OsCal 500 + D) 1 Tablet(s) Oral daily  chlorhexidine 2% Cloths 1 Application(s) Topical daily  clopidogrel Tablet 75 milliGRAM(s) Oral daily  dextrose 5%. 1000 milliLiter(s) (50 mL/Hr) IV Continuous <Continuous>  dextrose 50% Injectable 12.5 Gram(s) IV Push once  dextrose 50% Injectable 25 Gram(s) IV Push once  dextrose 50% Injectable 25 Gram(s) IV Push once  docusate sodium 100 milliGRAM(s) Oral three times a day  epoetin analilia Injectable 72832 Unit(s) IV Push <User Schedule>  ferrous    sulfate 325 milliGRAM(s) Oral three times a day  gabapentin 300 milliGRAM(s) Oral daily  heparin  Injectable 5000 Unit(s) SubCutaneous every 8 hours  insulin lispro (HumaLOG) corrective regimen sliding scale   SubCutaneous Before meals and at bedtime  isosorbide   mononitrate ER Tablet (IMDUR) 30 milliGRAM(s) Oral every 24 hours  isosorbide   mononitrate ER Tablet (IMDUR) 60 milliGRAM(s) Oral <User Schedule>  metoprolol tartrate 50 milliGRAM(s) Oral two times a day  NIFEdipine XL 60 milliGRAM(s) Oral daily  pantoprazole    Tablet 40 milliGRAM(s) Oral before breakfast  polyethylene glycol 3350 17 Gram(s) Oral daily  ursodiol Capsule 300 milliGRAM(s) Oral every 12 hours    MEDICATIONS  (PRN):  acetaminophen   Tablet .. 650 milliGRAM(s) Oral every 6 hours PRN Mild Pain (1 - 3)  dextrose 40% Gel 15 Gram(s) Oral once PRN Blood Glucose LESS THAN 70 milliGRAM(s)/deciliter  glucagon  Injectable 1 milliGRAM(s) IntraMuscular once PRN Glucose LESS THAN 70 milligrams/deciliter  simethicone 80 milliGRAM(s) Chew every 6 hours PRN abdominal bloating      Allergies    No Known Drug Allergies  Purell (Rash)    Intolerances        Review of Systems:    unable to obtain      Vital Signs Last 24 Hrs  T(C): 36.8 (11 Nov 2018 04:14), Max: 36.8 (11 Nov 2018 04:14)  T(F): 98.2 (11 Nov 2018 04:14), Max: 98.2 (11 Nov 2018 04:14)  HR: 72 (11 Nov 2018 04:14) (62 - 72)  BP: 173/84 (11 Nov 2018 04:14) (134/62 - 173/84)  BP(mean): --  RR: 18 (11 Nov 2018 04:14) (16 - 18)  SpO2: 96% (11 Nov 2018 04:14) (94% - 98%)    PHYSICAL EXAM:  Constitutional: NAD, lying in bed  HEENT: ncat  Neck: No LAD  Respiratory: dec bs  Cardiovascular: S1 and S2, RRR  Gastrointestinal: soft nt mild dt perc kevin tube in place w zero output  Extremities: no edema  Vascular: 2+ peripheral pulses  Neurological: awake but confused  Skin: No rashes      LABS:                                                   RADIOLOGY & ADDITIONAL TESTS:

## 2018-11-11 NOTE — PROGRESS NOTE ADULT - SUBJECTIVE AND OBJECTIVE BOX
[facility] -- Glynn Bullock, Susan, Claudia, Moragn Sams Savella  Office # [phone]    Follow Up:  Sepsis    Subjective/Observations: Sleeping soundly.  Per RN, patient is confused and attempts to get OOB when awake.  On 1:1 watch.  Comfortable on RA.    REVIEW OF SYSTEMS: All other review of systems is negative unless indicated above    PAST MEDICAL & SURGICAL HISTORY:  ESRD (end stage renal disease) on dialysis: MWF  CAD (coronary artery disease): s/p stent in 2007  Diabetes  Myocardial infarction  Hypertension  H/O: hysterectomy    MEDICATIONS  (STANDING):  aspirin enteric coated 81 milliGRAM(s) Oral daily  atorvastatin 40 milliGRAM(s) Oral at bedtime  calcium carbonate 1250 mG  + Vitamin D (OsCal 500 + D) 1 Tablet(s) Oral daily  chlorhexidine 2% Cloths 1 Application(s) Topical daily  clopidogrel Tablet 75 milliGRAM(s) Oral daily  dextrose 5%. 1000 milliLiter(s) (50 mL/Hr) IV Continuous <Continuous>  dextrose 50% Injectable 12.5 Gram(s) IV Push once  dextrose 50% Injectable 25 Gram(s) IV Push once  dextrose 50% Injectable 25 Gram(s) IV Push once  docusate sodium 100 milliGRAM(s) Oral three times a day  epoetin analilia Injectable 90212 Unit(s) IV Push <User Schedule>  ferrous    sulfate 325 milliGRAM(s) Oral three times a day  gabapentin 300 milliGRAM(s) Oral daily  heparin  Injectable 5000 Unit(s) SubCutaneous every 8 hours  insulin lispro (HumaLOG) corrective regimen sliding scale   SubCutaneous Before meals and at bedtime  isosorbide   mononitrate ER Tablet (IMDUR) 30 milliGRAM(s) Oral every 24 hours  isosorbide   mononitrate ER Tablet (IMDUR) 60 milliGRAM(s) Oral <User Schedule>  metoprolol tartrate 50 milliGRAM(s) Oral two times a day  NIFEdipine XL 90 milliGRAM(s) Oral daily  pantoprazole    Tablet 40 milliGRAM(s) Oral before breakfast  polyethylene glycol 3350 17 Gram(s) Oral daily  ursodiol Capsule 300 milliGRAM(s) Oral every 12 hours    MEDICATIONS  (PRN):  acetaminophen   Tablet .. 650 milliGRAM(s) Oral every 6 hours PRN Mild Pain (1 - 3)  dextrose 40% Gel 15 Gram(s) Oral once PRN Blood Glucose LESS THAN 70 milliGRAM(s)/deciliter  glucagon  Injectable 1 milliGRAM(s) IntraMuscular once PRN Glucose LESS THAN 70 milligrams/deciliter  simethicone 80 milliGRAM(s) Chew every 6 hours PRN abdominal bloating    Allergies    No Known Drug Allergies  Purell (Rash)    Intolerances    Vital Signs Last 24 Hrs  T(C): 36.8 (11 Nov 2018 04:14), Max: 36.8 (11 Nov 2018 04:14)  T(F): 98.2 (11 Nov 2018 04:14), Max: 98.2 (11 Nov 2018 04:14)  HR: 72 (11 Nov 2018 04:14) (62 - 72)  BP: 173/84 (11 Nov 2018 04:14) (134/62 - 173/84)  BP(mean): --  RR: 18 (11 Nov 2018 04:14) (16 - 18)  SpO2: 96% (11 Nov 2018 04:14) (94% - 98%)    I&O's Summary    10 Nov 2018 07:01  -  11 Nov 2018 07:00  --------------------------------------------------------  IN: 120 mL / OUT: 0 mL / NET: 120 mL      Weight (kg): 92.1 (11-11 @ 05:00)    PHYSICAL EXAM:  TELE: Not on tele  Constitutional: NAD, asleep, difficult to awaken, obese  HEENT: Moist Mucous Membranes, Anicteric  Pulmonary: Non-labored, breath sounds are clear bilaterally, No wheezing, rales or rhonchi  Cardiovascular: Regular, S1 and S2, No murmurs, rubs, gallops or clicks  Gastrointestinal: Bowel Sounds present, soft, nontender. + drain  Lymph: No peripheral edema. No lymphadenopathy.  Skin: No visible rashes or ulcers.  Psych:  Mood & affect appropriate    LABS: All Labs Reviewed:                        8.0    14.22 )-----------( 394      ( 09 Nov 2018 10:53 )             25.8     09 Nov 2018 10:53    134    |  97     |  60     ----------------------------<  196    4.4     |  29     |  7.40     Ca    7.8        09 Nov 2018 10:53    < from: TTE Echo Doppler w/o Cont (05.04.18 @ 20:56) >     EXAM:  ECHO TTE W/O CON COMP W/DOPPLR      PROCEDURE DATE:  05/04/2018        INTERPRETATION:  Height 165cm. weight 95kg. blood pressure 129/81.   Technician TE. Indication for study dyspnea.    Aortic root 2.3 cm left atrium 4.4 cm left ventricular end-diastolic   diameter 5.7 cm left ventricular end-systolic diameter 4.3 cm septal wall   thickness 1.2 cm posterior wall thickness 1.2 cm visually equivocally   estimated ejection fraction 50-55%.    The aortic root is calcified and of normaldiameter. 3 distinct leaflets   of the aortic valve are not well demonstrated by this study. The   technician was unable to identify any significant gradient across this   valve to suggest hemodynamically significant aortic stenosis. Left atrium   isenlarged. The left ventricle was difficult to visualize by all   standard echocardiographic windows. Possibly the end-diastolic diameter   is mildly increased. The wall thickness is borderline increased. Any   significant focal wall motion abnormality cannot be ascertained by this   study. Visually estimated ejection fraction 50-55%. The mitral annulus is   calcified. The mitral valve leaflets are mildly thickened with a normal   EF slope and excursion. There is no evidence for hemodynamically   significant mitral stenosis. On the very limited color flow Doppler   portion examination mild mitral regurgitation suggested.    Conclusion:  1. Technically difficult limited supine study. Please note that this is a   portable study and the study is also limited due to patient's body   habitus.  2. 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  3. Enlarged left atrium with associated mild mitral regurgitation.  4. Very limited visualization left ventricle which may represent mild   left ventricular concentric hypertrophy with a well-preserved ejection   fraction as described above.  5. A very small posterior pericardial effusion is suggested but not   diagnostic.  6. Correlate these limited findings clinically    SALVADOR PHILLIPS M.D., ATTENDING CARDIOLOGIST  This document has been electronically signed. May  5 2018  9:47AM      < end of copied text >

## 2018-11-11 NOTE — PROGRESS NOTE ADULT - PROBLEM SELECTOR PLAN 1
improved  s/p perc c-tube with no output   check lfts, tube check ?  repeat blood/bile fluid cxs ngtd  sp abx  as per surgery, cleared for dc  id following  monitor abd exam  diet as tolerated  dc planning per primary team

## 2018-11-11 NOTE — PROGRESS NOTE ADULT - SUBJECTIVE AND OBJECTIVE BOX
pt seen  sleeping comfortably  no complaints  ICU Vital Signs Last 24 Hrs  T(C): 36.8 (11 Nov 2018 04:14), Max: 36.8 (11 Nov 2018 04:14)  T(F): 98.2 (11 Nov 2018 04:14), Max: 98.2 (11 Nov 2018 04:14)  HR: 72 (11 Nov 2018 04:14) (62 - 72)  BP: 173/84 (11 Nov 2018 04:14) (134/62 - 173/84)  BP(mean): --  ABP: --  ABP(mean): --  RR: 18 (11 Nov 2018 04:14) (16 - 18)  SpO2: 96% (11 Nov 2018 04:14) (94% - 98%)  gen-NAD  resp-clear  abd-soft NT/ND  Pigtail in place                          8.0    14.22 )-----------( 394      ( 09 Nov 2018 10:53 )             25.8   11-09    134<L>  |  97  |  60<H>  ----------------------------<  196<H>  4.4   |  29  |  7.40<H>    Ca    7.8<L>      09 Nov 2018 10:53    I&O's Detail    10 Nov 2018 07:01  -  11 Nov 2018 07:00  --------------------------------------------------------  IN:    Oral Fluid: 120 mL  Total IN: 120 mL    OUT:  Total OUT: 0 mL    Total NET: 120 mL

## 2018-11-11 NOTE — PROGRESS NOTE ADULT - ASSESSMENT
Patient is now  status post cholecystotomy tube for biliary sepsis on 10/30.  She had rapid atrial fibrillation on 10/30 and converted to sinus rhythm. She required pressor therapy which has now been discontinued. Her white blood count improved today remains hemodynamically stable. She has chronic renal failure on hemodialysis. Her elevated troponin level may represent a component of subendocardial ischemia without true atherothrombosis. These results are nonspecific in the setting of her kidney disease. Currently afebrile and arousable with no evidence of decompensated heart failure or active ischemic.     Recommend:  - s/p fall with no overt injuries.  Fall precautions.  On 1:1 watch for safety  - cont dual antiplatelet therapy for now for CAD s/p stents  - cont statin   - cont metoprolol  - cont isosorbide mononitrate  - HD per Renal.  Tolerated 2L fluid removal on 11/9  - No labs today.  Monitor electrolytes  - Continue nifedipine 90 mg for HTN  - DVT ppx    Will continue to follow while inpatient

## 2018-11-12 LAB
ALBUMIN SERPL ELPH-MCNC: 2 G/DL — LOW (ref 3.3–5)
ALP SERPL-CCNC: 260 U/L — HIGH (ref 40–120)
ALT FLD-CCNC: 19 U/L — SIGNIFICANT CHANGE UP (ref 12–78)
ANION GAP SERPL CALC-SCNC: 9 MMOL/L — SIGNIFICANT CHANGE UP (ref 5–17)
AST SERPL-CCNC: 23 U/L — SIGNIFICANT CHANGE UP (ref 15–37)
BASOPHILS # BLD AUTO: 0.03 K/UL — SIGNIFICANT CHANGE UP (ref 0–0.2)
BASOPHILS NFR BLD AUTO: 0.2 % — SIGNIFICANT CHANGE UP (ref 0–2)
BILIRUB SERPL-MCNC: 0.7 MG/DL — SIGNIFICANT CHANGE UP (ref 0.2–1.2)
BUN SERPL-MCNC: 57 MG/DL — HIGH (ref 7–23)
CALCIUM SERPL-MCNC: 8.1 MG/DL — LOW (ref 8.5–10.1)
CHLORIDE SERPL-SCNC: 95 MMOL/L — LOW (ref 96–108)
CO2 SERPL-SCNC: 27 MMOL/L — SIGNIFICANT CHANGE UP (ref 22–31)
CREAT SERPL-MCNC: 7.7 MG/DL — HIGH (ref 0.5–1.3)
EOSINOPHIL # BLD AUTO: 0.33 K/UL — SIGNIFICANT CHANGE UP (ref 0–0.5)
EOSINOPHIL NFR BLD AUTO: 2.7 % — SIGNIFICANT CHANGE UP (ref 0–6)
GLUCOSE SERPL-MCNC: 185 MG/DL — HIGH (ref 70–99)
HCT VFR BLD CALC: 27.5 % — LOW (ref 34.5–45)
HGB BLD-MCNC: 8.4 G/DL — LOW (ref 11.5–15.5)
IMM GRANULOCYTES NFR BLD AUTO: 0.7 % — SIGNIFICANT CHANGE UP (ref 0–1.5)
LYMPHOCYTES # BLD AUTO: 27.6 % — SIGNIFICANT CHANGE UP (ref 13–44)
LYMPHOCYTES # BLD AUTO: 3.35 K/UL — HIGH (ref 1–3.3)
MCHC RBC-ENTMCNC: 27 PG — SIGNIFICANT CHANGE UP (ref 27–34)
MCHC RBC-ENTMCNC: 30.5 GM/DL — LOW (ref 32–36)
MCV RBC AUTO: 88.4 FL — SIGNIFICANT CHANGE UP (ref 80–100)
MONOCYTES # BLD AUTO: 1.02 K/UL — HIGH (ref 0–0.9)
MONOCYTES NFR BLD AUTO: 8.4 % — SIGNIFICANT CHANGE UP (ref 2–14)
NEUTROPHILS # BLD AUTO: 7.34 K/UL — SIGNIFICANT CHANGE UP (ref 1.8–7.4)
NEUTROPHILS NFR BLD AUTO: 60.4 % — SIGNIFICANT CHANGE UP (ref 43–77)
PLATELET # BLD AUTO: 403 K/UL — HIGH (ref 150–400)
POTASSIUM SERPL-MCNC: 5.5 MMOL/L — HIGH (ref 3.5–5.3)
POTASSIUM SERPL-SCNC: 5.5 MMOL/L — HIGH (ref 3.5–5.3)
PROT SERPL-MCNC: 7.3 G/DL — SIGNIFICANT CHANGE UP (ref 6–8.3)
RBC # BLD: 3.11 M/UL — LOW (ref 3.8–5.2)
RBC # FLD: 16.5 % — HIGH (ref 10.3–14.5)
SODIUM SERPL-SCNC: 131 MMOL/L — LOW (ref 135–145)
WBC # BLD: 12.15 K/UL — HIGH (ref 3.8–10.5)
WBC # FLD AUTO: 12.15 K/UL — HIGH (ref 3.8–10.5)

## 2018-11-12 PROCEDURE — 99232 SBSQ HOSP IP/OBS MODERATE 35: CPT

## 2018-11-12 PROCEDURE — 74150 CT ABDOMEN W/O CONTRAST: CPT | Mod: 26

## 2018-11-12 PROCEDURE — 99233 SBSQ HOSP IP/OBS HIGH 50: CPT | Mod: GC

## 2018-11-12 RX ADMIN — Medication 325 MILLIGRAM(S): at 22:10

## 2018-11-12 RX ADMIN — Medication 100 MILLIGRAM(S): at 05:07

## 2018-11-12 RX ADMIN — Medication 50 MILLIGRAM(S): at 18:10

## 2018-11-12 RX ADMIN — ISOSORBIDE MONONITRATE 30 MILLIGRAM(S): 60 TABLET, EXTENDED RELEASE ORAL at 18:10

## 2018-11-12 RX ADMIN — ISOSORBIDE MONONITRATE 60 MILLIGRAM(S): 60 TABLET, EXTENDED RELEASE ORAL at 05:08

## 2018-11-12 RX ADMIN — CHLORHEXIDINE GLUCONATE 1 APPLICATION(S): 213 SOLUTION TOPICAL at 17:41

## 2018-11-12 RX ADMIN — ERYTHROPOIETIN 10000 UNIT(S): 10000 INJECTION, SOLUTION INTRAVENOUS; SUBCUTANEOUS at 12:29

## 2018-11-12 RX ADMIN — CLOPIDOGREL BISULFATE 75 MILLIGRAM(S): 75 TABLET, FILM COATED ORAL at 18:10

## 2018-11-12 RX ADMIN — Medication 325 MILLIGRAM(S): at 05:08

## 2018-11-12 RX ADMIN — URSODIOL 300 MILLIGRAM(S): 250 TABLET, FILM COATED ORAL at 18:10

## 2018-11-12 RX ADMIN — Medication 4: at 22:10

## 2018-11-12 RX ADMIN — PANTOPRAZOLE SODIUM 40 MILLIGRAM(S): 20 TABLET, DELAYED RELEASE ORAL at 06:17

## 2018-11-12 RX ADMIN — GABAPENTIN 300 MILLIGRAM(S): 400 CAPSULE ORAL at 18:10

## 2018-11-12 RX ADMIN — Medication 90 MILLIGRAM(S): at 05:07

## 2018-11-12 RX ADMIN — Medication 50 MILLIGRAM(S): at 05:07

## 2018-11-12 RX ADMIN — Medication 4: at 18:16

## 2018-11-12 RX ADMIN — Medication 2: at 08:49

## 2018-11-12 RX ADMIN — URSODIOL 300 MILLIGRAM(S): 250 TABLET, FILM COATED ORAL at 05:08

## 2018-11-12 RX ADMIN — Medication 100 MILLIGRAM(S): at 22:10

## 2018-11-12 RX ADMIN — ATORVASTATIN CALCIUM 40 MILLIGRAM(S): 80 TABLET, FILM COATED ORAL at 22:10

## 2018-11-12 NOTE — PROGRESS NOTE ADULT - SUBJECTIVE AND OBJECTIVE BOX
INTERVAL HPI/OVERNIGHT EVENTS:  pt seen and examined  denies abd pain, on 1:1  per overnight rn no vomiting/diarrhea/s/s overt gib  afebrile overnight labs noted    MEDICATIONS  (STANDING):  aspirin enteric coated 81 milliGRAM(s) Oral daily  atorvastatin 40 milliGRAM(s) Oral at bedtime  calcium carbonate 1250 mG  + Vitamin D (OsCal 500 + D) 1 Tablet(s) Oral daily  chlorhexidine 2% Cloths 1 Application(s) Topical daily  clopidogrel Tablet 75 milliGRAM(s) Oral daily  dextrose 5%. 1000 milliLiter(s) (50 mL/Hr) IV Continuous <Continuous>  dextrose 50% Injectable 12.5 Gram(s) IV Push once  dextrose 50% Injectable 25 Gram(s) IV Push once  dextrose 50% Injectable 25 Gram(s) IV Push once  docusate sodium 100 milliGRAM(s) Oral three times a day  epoetin analilia Injectable 96194 Unit(s) IV Push <User Schedule>  ferrous    sulfate 325 milliGRAM(s) Oral three times a day  gabapentin 300 milliGRAM(s) Oral daily  insulin lispro (HumaLOG) corrective regimen sliding scale   SubCutaneous Before meals and at bedtime  isosorbide   mononitrate ER Tablet (IMDUR) 30 milliGRAM(s) Oral every 24 hours  isosorbide   mononitrate ER Tablet (IMDUR) 60 milliGRAM(s) Oral <User Schedule>  metoprolol tartrate 50 milliGRAM(s) Oral two times a day  NIFEdipine XL 90 milliGRAM(s) Oral daily  pantoprazole    Tablet 40 milliGRAM(s) Oral before breakfast  polyethylene glycol 3350 17 Gram(s) Oral daily  ursodiol Capsule 300 milliGRAM(s) Oral every 12 hours    MEDICATIONS  (PRN):  acetaminophen   Tablet .. 650 milliGRAM(s) Oral every 6 hours PRN Mild Pain (1 - 3)  dextrose 40% Gel 15 Gram(s) Oral once PRN Blood Glucose LESS THAN 70 milliGRAM(s)/deciliter  glucagon  Injectable 1 milliGRAM(s) IntraMuscular once PRN Glucose LESS THAN 70 milligrams/deciliter  simethicone 80 milliGRAM(s) Chew every 6 hours PRN abdominal bloating      Allergies    No Known Drug Allergies  Purell (Rash)    Intolerances        Review of Systems:    unable to obtain in entirety      Vital Signs Last 24 Hrs  T(C): 36.2 (12 Nov 2018 04:36), Max: 36.6 (11 Nov 2018 14:20)  T(F): 97.2 (12 Nov 2018 04:36), Max: 97.9 (11 Nov 2018 14:20)  HR: 69 (12 Nov 2018 04:36) (63 - 69)  BP: 151/83 (12 Nov 2018 04:36) (113/60 - 151/83)  BP(mean): --  RR: 18 (12 Nov 2018 04:36) (17 - 18)  SpO2: 95% (12 Nov 2018 04:36) (94% - 96%)    PHYSICAL EXAM:    Constitutional: NAD, lying in bed  HEENT: ncat  Neck: No LAD  Respiratory: dec bs  Cardiovascular: S1 and S2, RRR  Gastrointestinal: soft nt mild dt perc kevin tube in place  Extremities: no edema  Vascular: 2+ peripheral pulses  Neurological: awake but some confusion  Skin: No rashes        LABS:                        8.4    12.15 )-----------( 403      ( 12 Nov 2018 08:57 )             27.5     11-12    131<L>  |  95<L>  |  57<H>  ----------------------------<  185<H>  5.5<H>   |  27  |  7.70<H>    Ca    8.1<L>      12 Nov 2018 08:57    TPro  7.3  /  Alb  2.0<L>  /  TBili  0.7  /  DBili  x   /  AST  23  /  ALT  19  /  AlkPhos  260<H>  11-12    PT/INR - ( 11 Nov 2018 13:43 )   PT: 13.9 sec;   INR: 1.21 ratio               RADIOLOGY & ADDITIONAL TESTS:

## 2018-11-12 NOTE — PROGRESS NOTE ADULT - ASSESSMENT
·	ESRD on HD  ·	Cholecystitis, s/p Perc cholecystostomy  ·	Diabetes  ·	Hypertension    HD MWF. To continue HD TIW as scheduled. Fluid removal as tolerated by BP.   Dietary and PO fluid restriction. Epogen as needed for anemia. On IV abx.   Monitor BP trend. Titrate BP meds as needed. Salt restriction. Surgery follow up as out pt.   Monitor blood sugar levels. Insulin coverage as needed. D/c planning. ·	ESRD on HD  ·	Cholecystitis, s/p Perc cholecystostomy  ·	Diabetes  ·	Hypertension    HD MWF. To continue HD TIW as scheduled. Fluid removal as tolerated by BP.   Dietary and PO fluid restriction. Epogen as needed for anemia.  Monitor BP trend. Titrate BP meds as needed. Salt restriction. Surgery follow up as out pt.   Monitor blood sugar levels. Insulin coverage as needed. D/c planning.

## 2018-11-12 NOTE — PROGRESS NOTE ADULT - SUBJECTIVE AND OBJECTIVE BOX
Patient is a 68y old  Female who presents with a chief complaint of Sepsis (31 Oct 2018 07:06)  Patient seen in follow up for ESRD on HD. s/p Perc Cholecystostomy. + blood cultures ESBL     No new complaints. HD today.     PAST MEDICAL HISTORY:  ESRD (end stage renal disease) on dialysis  CAD (coronary artery disease)  Diabetes  CRF (chronic renal failure)  Hypertension  Pneumonia  Myocardial infarction  Hypertension  Diabetes       MEDICATIONS  (STANDING):  aspirin enteric coated 81 milliGRAM(s) Oral daily  atorvastatin 40 milliGRAM(s) Oral at bedtime  calcium carbonate 1250 mG  + Vitamin D (OsCal 500 + D) 1 Tablet(s) Oral daily  chlorhexidine 2% Cloths 1 Application(s) Topical daily  clopidogrel Tablet 75 milliGRAM(s) Oral daily  dextrose 5%. 1000 milliLiter(s) (50 mL/Hr) IV Continuous <Continuous>  dextrose 50% Injectable 12.5 Gram(s) IV Push once  dextrose 50% Injectable 25 Gram(s) IV Push once  dextrose 50% Injectable 25 Gram(s) IV Push once  docusate sodium 100 milliGRAM(s) Oral three times a day  epoetin analilia Injectable 15046 Unit(s) IV Push <User Schedule>  ferrous    sulfate 325 milliGRAM(s) Oral three times a day  gabapentin 300 milliGRAM(s) Oral daily  insulin lispro (HumaLOG) corrective regimen sliding scale   SubCutaneous Before meals and at bedtime  isosorbide   mononitrate ER Tablet (IMDUR) 30 milliGRAM(s) Oral every 24 hours  isosorbide   mononitrate ER Tablet (IMDUR) 60 milliGRAM(s) Oral <User Schedule>  metoprolol tartrate 50 milliGRAM(s) Oral two times a day  NIFEdipine XL 90 milliGRAM(s) Oral daily  pantoprazole    Tablet 40 milliGRAM(s) Oral before breakfast  polyethylene glycol 3350 17 Gram(s) Oral daily  ursodiol Capsule 300 milliGRAM(s) Oral every 12 hours    MEDICATIONS  (PRN):  acetaminophen   Tablet .. 650 milliGRAM(s) Oral every 6 hours PRN Mild Pain (1 - 3)  dextrose 40% Gel 15 Gram(s) Oral once PRN Blood Glucose LESS THAN 70 milliGRAM(s)/deciliter  glucagon  Injectable 1 milliGRAM(s) IntraMuscular once PRN Glucose LESS THAN 70 milligrams/deciliter  simethicone 80 milliGRAM(s) Chew every 6 hours PRN abdominal bloating    T(C): 36.2 (11-12-18 @ 04:36), Max: 36.8 (11-11-18 @ 04:14)  HR: 69 (11-12-18 @ 04:36) (62 - 72)  BP: 151/83 (11-12-18 @ 04:36) (113/60 - 173/84)  RR: 18 (11-12-18 @ 04:36)  SpO2: 95% (11-12-18 @ 04:36)  Wt(kg): --  I&O's Detail    11 Nov 2018 07:01  -  12 Nov 2018 07:00  --------------------------------------------------------  IN:  Total IN: 0 mL    OUT:  Total OUT: 0 mL    Total NET: 0 mL      PHYSICAL EXAM:  General: NAD, Rt chest dialysis catheter  Respiratory: b/l air entry  Cardiovascular: S1 S2  Gastrointestinal: soft, cholecystostomy tube  Extremities:  no edema      LABORATORY:                        8.4    12.15 )-----------( 403      ( 12 Nov 2018 08:57 )             27.5     11-12    131<L>  |  95<L>  |  57<H>  ----------------------------<  185<H>  5.5<H>   |  27  |  7.70<H>    Ca    8.1<L>      12 Nov 2018 08:57    TPro  7.3  /  Alb  2.0<L>  /  TBili  0.7  /  DBili  x   /  AST  23  /  ALT  19  /  AlkPhos  260<H>  11-12    Sodium, Serum: 131 mmol/L (11-12 @ 08:57)    Potassium, Serum: 5.5 mmol/L (11-12 @ 08:57)    Hemoglobin: 8.4 g/dL (11-12 @ 08:57)    Creatinine, Serum 7.70 (11-12 @ 08:57)        LIVER FUNCTIONS - ( 12 Nov 2018 08:57 )  Alb: 2.0 g/dL / Pro: 7.3 g/dL / ALK PHOS: 260 U/L / ALT: 19 U/L / AST: 23 U/L / GGT: x

## 2018-11-12 NOTE — PROGRESS NOTE ADULT - ASSESSMENT
Patient is now  status post cholecystotomy tube for biliary sepsis on 10/30.  She had rapid atrial fibrillation on 10/30 and converted to sinus rhythm. She required pressor therapy which has now been discontinued. Her white blood count improved today remains hemodynamically stable. She has chronic renal failure on hemodialysis. Her elevated troponin level may represent a component of subendocardial ischemia without true atherothrombosis. These results are nonspecific in the setting of her kidney disease. Currently afebrile and arousable with no evidence of decompensated heart failure or active ischemic.     Recommend:  - s/p fall with no overt injuries.  Fall precautions.  - cont dual antiplatelet therapy for now for CAD s/p stents  - cont statin   - cont metoprolol  - cont isosorbide mononitrate  - HD per Renal.   - No labs today.  Monitor electrolytes  - Continue nifedipine 90 mg for HTN as tolerated  - DVT ppx  -Will continue to follow while inpatient  Sole Medrano, DAIJA, Cardiology NP no

## 2018-11-12 NOTE — PROGRESS NOTE ADULT - PROBLEM SELECTOR PLAN 4
s/p stent in 2007. trops are trending down at .308.  -Afib with RVR. Received amio and digoxin .5mg IV. Rate improved.  -C/W Asa, Plavix, imdur   -cardio following

## 2018-11-12 NOTE — PROGRESS NOTE ADULT - PROBLEM SELECTOR PLAN 1
improved  s/p perc c-tube with minimal output  for possible ?tube check  lfts improved  repeat blood/bile fluid cxs ngtd  sp abx  f/u surgery recs  monitor abd exam  diet as tolerated  dc planning per primary team

## 2018-11-12 NOTE — PROGRESS NOTE ADULT - SUBJECTIVE AND OBJECTIVE BOX
Patient is a 68y old  Female who presents with a chief complaint of Sepsis (12 Nov 2018 11:13)      INTERVAL HPI/OVERNIGHT EVENTS: Patient seen and examined at bedside.   ID: [id number]   States that she has mild Right sided abdominal pain at location of drain. Denies any other complaints. Patient is for IR today to recheck tube. Patient is for dialysis  today.     T(F): 97.2 (11-12-18 @ 13:52), Max: 98.4 (11-12-18 @ 09:55)  HR: 68 (11-12-18 @ 13:52) (63 - 69)  BP: 138/67 (11-12-18 @ 13:52) (113/60 - 156/64)  RR: 17 (11-12-18 @ 13:52) (17 - 18)  SpO2: 94% (11-12-18 @ 13:52) (94% - 96%)  I&O's Summary    11 Nov 2018 07:01  -  12 Nov 2018 07:00  --------------------------------------------------------  IN: 0 mL / OUT: 0 mL / NET: 0 mL    12 Nov 2018 07:01  -  12 Nov 2018 14:39  --------------------------------------------------------  IN: 0 mL / OUT: 2000 mL / NET: -2000 mL        REVIEW OF SYSTEMS:  CONSTITUTIONAL: No fever, weight loss   EYES: No eye pain, visual disturbances, or discharge  ENMT:  No difficulty hearing, tinnitus, vertigo; No sinus or throat pain  RESPIRATORY: No cough, wheezing, chills or hemoptysis; No shortness of breath  CARDIOVASCULAR: No chest pain, palpitations, dizziness, or leg swelling  GASTROINTESTINAL: + abdominal pain, no epigastric pain. No nausea, vomiting, or hematemesis; No diarrhea or constipation. No melena or hematochezia.  GENITOURINARY: No dysuria, frequency, hematuria, or incontinence  NEUROLOGICAL: No headaches      PHYSICAL EXAM:  GENERAL: NAD, well-groomed, well-developed  HEAD:  Atraumatic, Normocephalic  NERVOUS SYSTEM:  Alert & Oriented X3, Good concentration  CHEST/LUNG: Clear to percussion bilaterally; No rales, rhonchi, wheezing, or rubs  HEART: Regular rate and rhythm; No murmurs, rubs, or gallops  ABDOMEN: Soft, +tenderness of RLQ, Nondistended; Bowel sounds present, tube in place  EXTREMITIES: No clubbing, cyanosis, or edema      LABS:                        8.4    12.15 )-----------( 403      ( 12 Nov 2018 08:57 )             27.5     11-12    131<L>  |  95<L>  |  57<H>  ----------------------------<  185<H>  5.5<H>   |  27  |  7.70<H>    Ca    8.1<L>      12 Nov 2018 08:57    TPro  7.3  /  Alb  2.0<L>  /  TBili  0.7  /  DBili  x   /  AST  23  /  ALT  19  /  AlkPhos  260<H>  11-12    PT/INR - ( 11 Nov 2018 13:43 )   PT: 13.9 sec;   INR: 1.21 ratio           CAPILLARY BLOOD GLUCOSE      POCT Blood Glucose.: 160 mg/dL (12 Nov 2018 11:49)  POCT Blood Glucose.: 168 mg/dL (12 Nov 2018 08:10)  POCT Blood Glucose.: 170 mg/dL (11 Nov 2018 21:13)  POCT Blood Glucose.: 287 mg/dL (11 Nov 2018 16:55)      MEDICATIONS  (STANDING):  aspirin enteric coated 81 milliGRAM(s) Oral daily  atorvastatin 40 milliGRAM(s) Oral at bedtime  calcium carbonate 1250 mG  + Vitamin D (OsCal 500 + D) 1 Tablet(s) Oral daily  chlorhexidine 2% Cloths 1 Application(s) Topical daily  clopidogrel Tablet 75 milliGRAM(s) Oral daily  dextrose 5%. 1000 milliLiter(s) (50 mL/Hr) IV Continuous <Continuous>  dextrose 50% Injectable 12.5 Gram(s) IV Push once  dextrose 50% Injectable 25 Gram(s) IV Push once  dextrose 50% Injectable 25 Gram(s) IV Push once  docusate sodium 100 milliGRAM(s) Oral three times a day  epoetin analilia Injectable 86619 Unit(s) IV Push <User Schedule>  ferrous    sulfate 325 milliGRAM(s) Oral three times a day  gabapentin 300 milliGRAM(s) Oral daily  insulin lispro (HumaLOG) corrective regimen sliding scale   SubCutaneous Before meals and at bedtime  isosorbide   mononitrate ER Tablet (IMDUR) 30 milliGRAM(s) Oral every 24 hours  isosorbide   mononitrate ER Tablet (IMDUR) 60 milliGRAM(s) Oral <User Schedule>  metoprolol tartrate 50 milliGRAM(s) Oral two times a day  NIFEdipine XL 90 milliGRAM(s) Oral daily  pantoprazole    Tablet 40 milliGRAM(s) Oral before breakfast  polyethylene glycol 3350 17 Gram(s) Oral daily  ursodiol Capsule 300 milliGRAM(s) Oral every 12 hours    MEDICATIONS  (PRN):  acetaminophen   Tablet .. 650 milliGRAM(s) Oral every 6 hours PRN Mild Pain (1 - 3)  dextrose 40% Gel 15 Gram(s) Oral once PRN Blood Glucose LESS THAN 70 milliGRAM(s)/deciliter  glucagon  Injectable 1 milliGRAM(s) IntraMuscular once PRN Glucose LESS THAN 70 milligrams/deciliter  simethicone 80 milliGRAM(s) Chew every 6 hours PRN abdominal bloating

## 2018-11-12 NOTE — PROGRESS NOTE ADULT - PROBLEM SELECTOR PLAN 1
-2/2 acute calculus cholecystitis with biliary dilatation with common bile duct stone.   -Blood Cx + for ESBL. E.coli, repeat BC negative   -WBC improving   -completed course of iv antibx of ertapenem  -family wants pt to go to rehab  -SW note awaiting authorization from insurance  -C tube to be checked by IR today

## 2018-11-12 NOTE — PROGRESS NOTE ADULT - ASSESSMENT
68F with PMH of CAD s/p stent (2007), ESRD on HD (MWF; missed today's session), DM type 2, and HTN presents with fever and admitted with severe sepsis secondary to acute choledocholithiasis, s/p perc cholecystostomy tube. Is for dialysis today, and recheck of tube by IR.

## 2018-11-12 NOTE — CHART NOTE - NSCHARTNOTEFT_GEN_A_CORE
Assessment: Pt NPO for IR procedure. has been tolerating diet with poor to fair intake, takes Ensure supplement at times. BM today noted. had HD this am.    Factors impacting intake: [x ] none [ ] nausea  [ ] vomiting [ ] diarrhea [ ] constipation  [ ]chewing problems [ ] swallowing issues  [ ] other:     Diet Presciption: Diet, Consistent Carbohydrate Renal/No Snacks:   Supplement Feeding Modality:  Oral  Ensure Enlive Cans or Servings Per Day:  1       Frequency:  Three Times a day (11-06-18 @ 16:45)  Diet, NPO after Midnight:      NPO Start Date: 11-Nov-2018,   NPO Start Time: 23:59 (11-11-18 @ 15:41)  Diet, NPO after Midnight:      NPO Start Date: 11-Nov-2018,   NPO Start Time: 23:59  Except Medications (11-11-18 @ 23:04)    Intake: 25-75%    Current Weight: Weight (kg): 92.8 (11-12 @ 05:00)      Pertinent Medications: MEDICATIONS  (STANDING):  aspirin enteric coated 81 milliGRAM(s) Oral daily  atorvastatin 40 milliGRAM(s) Oral at bedtime  calcium carbonate 1250 mG  + Vitamin D (OsCal 500 + D) 1 Tablet(s) Oral daily  chlorhexidine 2% Cloths 1 Application(s) Topical daily  clopidogrel Tablet 75 milliGRAM(s) Oral daily  dextrose 5%. 1000 milliLiter(s) (50 mL/Hr) IV Continuous <Continuous>  dextrose 50% Injectable 12.5 Gram(s) IV Push once  dextrose 50% Injectable 25 Gram(s) IV Push once  dextrose 50% Injectable 25 Gram(s) IV Push once  docusate sodium 100 milliGRAM(s) Oral three times a day  epoetin analilia Injectable 08403 Unit(s) IV Push <User Schedule>  ferrous    sulfate 325 milliGRAM(s) Oral three times a day  gabapentin 300 milliGRAM(s) Oral daily  insulin lispro (HumaLOG) corrective regimen sliding scale   SubCutaneous Before meals and at bedtime  isosorbide   mononitrate ER Tablet (IMDUR) 30 milliGRAM(s) Oral every 24 hours  isosorbide   mononitrate ER Tablet (IMDUR) 60 milliGRAM(s) Oral <User Schedule>  metoprolol tartrate 50 milliGRAM(s) Oral two times a day  NIFEdipine XL 90 milliGRAM(s) Oral daily  pantoprazole    Tablet 40 milliGRAM(s) Oral before breakfast  polyethylene glycol 3350 17 Gram(s) Oral daily  ursodiol Capsule 300 milliGRAM(s) Oral every 12 hours    MEDICATIONS  (PRN):  acetaminophen   Tablet .. 650 milliGRAM(s) Oral every 6 hours PRN Mild Pain (1 - 3)  dextrose 40% Gel 15 Gram(s) Oral once PRN Blood Glucose LESS THAN 70 milliGRAM(s)/deciliter  glucagon  Injectable 1 milliGRAM(s) IntraMuscular once PRN Glucose LESS THAN 70 milligrams/deciliter  simethicone 80 milliGRAM(s) Chew every 6 hours PRN abdominal bloating    Pertinent Labs: 11-12 Na131 mmol/L<L> Glu 185 mg/dL<H> K+ 5.5 mmol/L<H> Cr  7.70 mg/dL<H> BUN 57 mg/dL<H> 11-12 Alb 2.0 g/dL<L> 10-30 [id number] 9.6 %<H>     CAPILLARY BLOOD GLUCOSE      POCT Blood Glucose.: 160 mg/dL (12 Nov 2018 11:49)  POCT Blood Glucose.: 168 mg/dL (12 Nov 2018 08:10)  POCT Blood Glucose.: 170 mg/dL (11 Nov 2018 21:13)  POCT Blood Glucose.: 287 mg/dL (11 Nov 2018 16:55)    Skin: No pressure injuries noted    Estimated Needs:   [x ] no change since previous assessment  [ ] recalculated:     Previous Nutrition Diagnosis:   [ ] Inadequate Energy Intake [ ]Inadequate Oral Intake [ ] Excessive Energy Intake   [ ] Underweight [ ] Increased Nutrient Needs [ ] Overweight/Obesity   [ x] Altered GI Function [ ] Unintended Weight Loss [ ] Food & Nutrition Related Knowledge Deficit [ ] Malnutrition     Nutrition Diagnosis is [x ] ongoing  [ ] resolved [ ] not applicable     New Nutrition Diagnosis: [ x] not applicable       Interventions:   Recommend  [ ] Change Diet To:  [ ] Nutrition Supplement  [ ] Nutrition Support  [ ] Other:     Monitoring and Evaluation:   [ ] PO intake [ x ] Tolerance to diet prescription [ x ] weights [ x ] labs[ x ] follow up per protocol  [ ] other:

## 2018-11-12 NOTE — PROGRESS NOTE ADULT - SUBJECTIVE AND OBJECTIVE BOX
pt seen  no issues  ICU Vital Signs Last 24 Hrs  T(C): 36.2 (12 Nov 2018 04:36), Max: 36.6 (11 Nov 2018 14:20)  T(F): 97.2 (12 Nov 2018 04:36), Max: 97.9 (11 Nov 2018 14:20)  HR: 69 (12 Nov 2018 04:36) (63 - 69)  BP: 151/83 (12 Nov 2018 04:36) (113/60 - 151/83)  BP(mean): --  ABP: --  ABP(mean): --  RR: 18 (12 Nov 2018 04:36) (17 - 18)  SpO2: 95% (12 Nov 2018 04:36) (94% - 96%)  gen-NAD  resp-clear  abd-soft ND, mild RUQ tenderness  drain no outpt, tube kinked                          8.4    12.15 )-----------( 403      ( 12 Nov 2018 08:57 )             27.5

## 2018-11-12 NOTE — PROGRESS NOTE ADULT - PROBLEM SELECTOR PLAN 3
-Ac cholecystitis with gall stones, cholangitis and choledocholithiasis  -S/P P/C cholecystoscopy and drainage, will check with IR to verify proper   -S/P MRCP no stone in CBD, FU GI,   improving in AST/ALT, c/w ursodiol.  fluid cx + for  E.coli, completed course of ertapenem   -c-tube in place, to be rechecked by IR today   -awaiting rehab placement

## 2018-11-12 NOTE — PROGRESS NOTE ADULT - SUBJECTIVE AND OBJECTIVE BOX
[facility] -- Glynn Bullock, Susan, Claudia, Morgan Sams Savella  Office # [phone]      Follow Up:  CAD, Sepsis    Subjective/Observations: Seen and evaluated. Patient is undergoing HD now. Denies chest pain, SOB, palpitation/ Orthopnea.      REVIEW OF SYSTEMS: All other review of systems is negative unless indicated above    PAST MEDICAL & SURGICAL HISTORY:  ESRD (end stage renal disease) on dialysis: MWF  CAD (coronary artery disease): s/p stent in 2007  Diabetes  Myocardial infarction  Hypertension  H/O: hysterectomy      MEDICATIONS  (STANDING):  aspirin enteric coated 81 milliGRAM(s) Oral daily  atorvastatin 40 milliGRAM(s) Oral at bedtime  calcium carbonate 1250 mG  + Vitamin D (OsCal 500 + D) 1 Tablet(s) Oral daily  chlorhexidine 2% Cloths 1 Application(s) Topical daily  clopidogrel Tablet 75 milliGRAM(s) Oral daily  dextrose 5%. 1000 milliLiter(s) (50 mL/Hr) IV Continuous <Continuous>  dextrose 50% Injectable 12.5 Gram(s) IV Push once  dextrose 50% Injectable 25 Gram(s) IV Push once  dextrose 50% Injectable 25 Gram(s) IV Push once  docusate sodium 100 milliGRAM(s) Oral three times a day  epoetin analilia Injectable 98790 Unit(s) IV Push <User Schedule>  ferrous    sulfate 325 milliGRAM(s) Oral three times a day  gabapentin 300 milliGRAM(s) Oral daily  insulin lispro (HumaLOG) corrective regimen sliding scale   SubCutaneous Before meals and at bedtime  isosorbide   mononitrate ER Tablet (IMDUR) 30 milliGRAM(s) Oral every 24 hours  isosorbide   mononitrate ER Tablet (IMDUR) 60 milliGRAM(s) Oral <User Schedule>  metoprolol tartrate 50 milliGRAM(s) Oral two times a day  NIFEdipine XL 90 milliGRAM(s) Oral daily  pantoprazole    Tablet 40 milliGRAM(s) Oral before breakfast  polyethylene glycol 3350 17 Gram(s) Oral daily  ursodiol Capsule 300 milliGRAM(s) Oral every 12 hours    MEDICATIONS  (PRN):  acetaminophen   Tablet .. 650 milliGRAM(s) Oral every 6 hours PRN Mild Pain (1 - 3)  dextrose 40% Gel 15 Gram(s) Oral once PRN Blood Glucose LESS THAN 70 milliGRAM(s)/deciliter  glucagon  Injectable 1 milliGRAM(s) IntraMuscular once PRN Glucose LESS THAN 70 milligrams/deciliter  simethicone 80 milliGRAM(s) Chew every 6 hours PRN abdominal bloating      Allergies    No Known Drug Allergies  Purell (Rash)    Intolerances            Vital Signs Last 24 Hrs  T(C): 36.2 (12 Nov 2018 04:36), Max: 36.6 (11 Nov 2018 14:20)  T(F): 97.2 (12 Nov 2018 04:36), Max: 97.9 (11 Nov 2018 14:20)  HR: 69 (12 Nov 2018 04:36) (63 - 69)  BP: 151/83 (12 Nov 2018 04:36) (113/60 - 151/83)  BP(mean): --  RR: 18 (12 Nov 2018 04:36) (17 - 18)  SpO2: 95% (12 Nov 2018 04:36) (94% - 96%)    I&O's Summary    11 Nov 2018 07:01  -  12 Nov 2018 07:00  --------------------------------------------------------  IN: 0 mL / OUT: 0 mL / NET: 0 mL      Weight (kg): 92.8 (11-12 @ 05:00)    PHYSICAL EXAM:  TELE: Not on tele  Constitutional: NAD, awake and alert, well-developed  HEENT: Moist Mucous Membranes, Anicteric  Pulmonary: Non-labored, breath sounds are clear bilaterally, No wheezing, rales or rhonchi  Cardiovascular: Regular, S1 and S2, No murmurs, rubs, gallops or clicks  Gastrointestinal: Bowel Sounds present, soft, nontender.   Lymph: No peripheral edema. No lymphadenopathy.  Skin: No visible rashes or ulcers.  Psych:  Mood & affect appropriate    LABS: All Labs Reviewed:                        8.4    12.15 )-----------( 403      ( 12 Nov 2018 08:57 )             27.5                         8.0    14.22 )-----------( 394      ( 09 Nov 2018 10:53 )             25.8     12 Nov 2018 08:57    131    |  95     |  57     ----------------------------<  185    5.5     |  27     |  7.70   09 Nov 2018 10:53    134    |  97     |  60     ----------------------------<  196    4.4     |  29     |  7.40     Ca    8.1        12 Nov 2018 08:57  Ca    7.8        09 Nov 2018 10:53    TPro  7.3    /  Alb  2.0    /  TBili  0.7    /  DBili  x      /  AST  23     /  ALT  19     /  AlkPhos  260    12 Nov 2018 08:57    PT/INR - ( 11 Nov 2018 13:43 )   PT: 13.9 sec;   INR: 1.21 ratio         < from: TTE Echo Doppler w/o Cont (05.04.18 @ 20:56) >    Conclusion:  1. Technically difficult limited supine study. Please note that this is a   portable study and the study is also limited due to patient's body   habitus.  2. 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  3. Enlarged left atrium with associated mild mitral regurgitation.  4. Very limited visualization left ventricle which may represent mild   left ventricular concentric hypertrophy with a well-preserved ejection   fraction as described above.  5. A very small posterior pericardial effusion is suggested but not   diagnostic.  6. Correlate these limited findings clinically.      < from: 12 Lead ECG (11.02.18 @ 08:59) >    Diagnosis Line Normal sinus rhythm  Right bundle branch block  Inferior infarct (cited on or before 31-OCT-2018)  Abnormal ECG    Confirmed by Jovanna Hunter ([id number]) on 11/3/2018 11:50:16 AM    < end of copied text >  < from: Xray Chest 1 View-PORTABLE IMMEDIATE (10.31.18 @ 21:52) >  INTERPRETATION:  Chest portable.    Clinical History: Nasogastric tube placement.    Comparison: 10/30/2018.    Single AP view submitted.    Patient is rotated.    Nasogastric tube is present, tip below the level of the hemidiaphragm.    Again noted is right-sided dialysis catheter.  The left-sided central venous catheter appears to been removed.    The evaluation of the cardiomediastinal silhouette is limited on portable   technique.    Low lung volumes are present.  Again noted is perihilar airspace consolidation, most pronounced in the   right.  There are probable small bilateral pleural effusions.    Impression:    Findings as discussed above.

## 2018-11-13 ENCOUNTER — TRANSCRIPTION ENCOUNTER (OUTPATIENT)
Age: 68
End: 2018-11-13

## 2018-11-13 LAB
ANION GAP SERPL CALC-SCNC: 9 MMOL/L — SIGNIFICANT CHANGE UP (ref 5–17)
BASOPHILS # BLD AUTO: 0.04 K/UL — SIGNIFICANT CHANGE UP (ref 0–0.2)
BASOPHILS NFR BLD AUTO: 0.4 % — SIGNIFICANT CHANGE UP (ref 0–2)
BUN SERPL-MCNC: 26 MG/DL — HIGH (ref 7–23)
CALCIUM SERPL-MCNC: 8.2 MG/DL — LOW (ref 8.5–10.1)
CHLORIDE SERPL-SCNC: 99 MMOL/L — SIGNIFICANT CHANGE UP (ref 96–108)
CO2 SERPL-SCNC: 27 MMOL/L — SIGNIFICANT CHANGE UP (ref 22–31)
CREAT SERPL-MCNC: 4.6 MG/DL — HIGH (ref 0.5–1.3)
EOSINOPHIL # BLD AUTO: 0.3 K/UL — SIGNIFICANT CHANGE UP (ref 0–0.5)
EOSINOPHIL NFR BLD AUTO: 2.9 % — SIGNIFICANT CHANGE UP (ref 0–6)
GLUCOSE SERPL-MCNC: 161 MG/DL — HIGH (ref 70–99)
HCT VFR BLD CALC: 27.4 % — LOW (ref 34.5–45)
HGB BLD-MCNC: 8.3 G/DL — LOW (ref 11.5–15.5)
IMM GRANULOCYTES NFR BLD AUTO: 0.9 % — SIGNIFICANT CHANGE UP (ref 0–1.5)
LYMPHOCYTES # BLD AUTO: 3.18 K/UL — SIGNIFICANT CHANGE UP (ref 1–3.3)
LYMPHOCYTES # BLD AUTO: 30.5 % — SIGNIFICANT CHANGE UP (ref 13–44)
MCHC RBC-ENTMCNC: 27.3 PG — SIGNIFICANT CHANGE UP (ref 27–34)
MCHC RBC-ENTMCNC: 30.3 GM/DL — LOW (ref 32–36)
MCV RBC AUTO: 90.1 FL — SIGNIFICANT CHANGE UP (ref 80–100)
MONOCYTES # BLD AUTO: 0.97 K/UL — HIGH (ref 0–0.9)
MONOCYTES NFR BLD AUTO: 9.3 % — SIGNIFICANT CHANGE UP (ref 2–14)
NEUTROPHILS # BLD AUTO: 5.84 K/UL — SIGNIFICANT CHANGE UP (ref 1.8–7.4)
NEUTROPHILS NFR BLD AUTO: 56 % — SIGNIFICANT CHANGE UP (ref 43–77)
PLATELET # BLD AUTO: 396 K/UL — SIGNIFICANT CHANGE UP (ref 150–400)
POTASSIUM SERPL-MCNC: 4.7 MMOL/L — SIGNIFICANT CHANGE UP (ref 3.5–5.3)
POTASSIUM SERPL-SCNC: 4.7 MMOL/L — SIGNIFICANT CHANGE UP (ref 3.5–5.3)
RBC # BLD: 3.04 M/UL — LOW (ref 3.8–5.2)
RBC # FLD: 16.6 % — HIGH (ref 10.3–14.5)
SODIUM SERPL-SCNC: 135 MMOL/L — SIGNIFICANT CHANGE UP (ref 135–145)
WBC # BLD: 10.42 K/UL — SIGNIFICANT CHANGE UP (ref 3.8–10.5)
WBC # FLD AUTO: 10.42 K/UL — SIGNIFICANT CHANGE UP (ref 3.8–10.5)

## 2018-11-13 PROCEDURE — 99232 SBSQ HOSP IP/OBS MODERATE 35: CPT

## 2018-11-13 PROCEDURE — 99233 SBSQ HOSP IP/OBS HIGH 50: CPT | Mod: GC

## 2018-11-13 RX ORDER — ACETAMINOPHEN 500 MG
2 TABLET ORAL
Qty: 0 | Refills: 0 | COMMUNITY
Start: 2018-11-13

## 2018-11-13 RX ADMIN — Medication 4: at 12:45

## 2018-11-13 RX ADMIN — ISOSORBIDE MONONITRATE 30 MILLIGRAM(S): 60 TABLET, EXTENDED RELEASE ORAL at 17:41

## 2018-11-13 RX ADMIN — GABAPENTIN 300 MILLIGRAM(S): 400 CAPSULE ORAL at 12:45

## 2018-11-13 RX ADMIN — ATORVASTATIN CALCIUM 40 MILLIGRAM(S): 80 TABLET, FILM COATED ORAL at 21:48

## 2018-11-13 RX ADMIN — Medication 325 MILLIGRAM(S): at 13:38

## 2018-11-13 RX ADMIN — Medication 325 MILLIGRAM(S): at 21:48

## 2018-11-13 RX ADMIN — POLYETHYLENE GLYCOL 3350 17 GRAM(S): 17 POWDER, FOR SOLUTION ORAL at 12:45

## 2018-11-13 RX ADMIN — Medication 6: at 17:41

## 2018-11-13 RX ADMIN — ISOSORBIDE MONONITRATE 60 MILLIGRAM(S): 60 TABLET, EXTENDED RELEASE ORAL at 05:16

## 2018-11-13 RX ADMIN — CHLORHEXIDINE GLUCONATE 1 APPLICATION(S): 213 SOLUTION TOPICAL at 12:54

## 2018-11-13 RX ADMIN — URSODIOL 300 MILLIGRAM(S): 250 TABLET, FILM COATED ORAL at 17:42

## 2018-11-13 RX ADMIN — Medication 50 MILLIGRAM(S): at 05:15

## 2018-11-13 RX ADMIN — Medication 100 MILLIGRAM(S): at 13:38

## 2018-11-13 RX ADMIN — Medication 81 MILLIGRAM(S): at 12:45

## 2018-11-13 RX ADMIN — Medication 1 TABLET(S): at 12:45

## 2018-11-13 RX ADMIN — Medication 50 MILLIGRAM(S): at 17:41

## 2018-11-13 RX ADMIN — Medication 90 MILLIGRAM(S): at 05:16

## 2018-11-13 RX ADMIN — Medication 100 MILLIGRAM(S): at 05:16

## 2018-11-13 RX ADMIN — Medication 2: at 21:48

## 2018-11-13 RX ADMIN — PANTOPRAZOLE SODIUM 40 MILLIGRAM(S): 20 TABLET, DELAYED RELEASE ORAL at 06:52

## 2018-11-13 RX ADMIN — Medication 325 MILLIGRAM(S): at 05:16

## 2018-11-13 RX ADMIN — URSODIOL 300 MILLIGRAM(S): 250 TABLET, FILM COATED ORAL at 05:15

## 2018-11-13 RX ADMIN — CLOPIDOGREL BISULFATE 75 MILLIGRAM(S): 75 TABLET, FILM COATED ORAL at 12:45

## 2018-11-13 RX ADMIN — Medication 100 MILLIGRAM(S): at 21:49

## 2018-11-13 RX ADMIN — Medication 2: at 08:33

## 2018-11-13 NOTE — PROGRESS NOTE ADULT - SUBJECTIVE AND OBJECTIVE BOX
INTERVAL HPI/OVERNIGHT EVENTS:  pt seen and examined  denies abd pain  per overnight rn no s/s overt gib, no vomiting no diarrhea  afebrile overnight labs noted    MEDICATIONS  (STANDING):  aspirin enteric coated 81 milliGRAM(s) Oral daily  atorvastatin 40 milliGRAM(s) Oral at bedtime  calcium carbonate 1250 mG  + Vitamin D (OsCal 500 + D) 1 Tablet(s) Oral daily  chlorhexidine 2% Cloths 1 Application(s) Topical daily  clopidogrel Tablet 75 milliGRAM(s) Oral daily  dextrose 5%. 1000 milliLiter(s) (50 mL/Hr) IV Continuous <Continuous>  dextrose 50% Injectable 12.5 Gram(s) IV Push once  dextrose 50% Injectable 25 Gram(s) IV Push once  dextrose 50% Injectable 25 Gram(s) IV Push once  docusate sodium 100 milliGRAM(s) Oral three times a day  epoetin analilia Injectable 48347 Unit(s) IV Push <User Schedule>  ferrous    sulfate 325 milliGRAM(s) Oral three times a day  gabapentin 300 milliGRAM(s) Oral daily  insulin lispro (HumaLOG) corrective regimen sliding scale   SubCutaneous Before meals and at bedtime  isosorbide   mononitrate ER Tablet (IMDUR) 30 milliGRAM(s) Oral every 24 hours  isosorbide   mononitrate ER Tablet (IMDUR) 60 milliGRAM(s) Oral <User Schedule>  metoprolol tartrate 50 milliGRAM(s) Oral two times a day  NIFEdipine XL 90 milliGRAM(s) Oral daily  pantoprazole    Tablet 40 milliGRAM(s) Oral before breakfast  polyethylene glycol 3350 17 Gram(s) Oral daily  ursodiol Capsule 300 milliGRAM(s) Oral every 12 hours    MEDICATIONS  (PRN):  acetaminophen   Tablet .. 650 milliGRAM(s) Oral every 6 hours PRN Mild Pain (1 - 3)  dextrose 40% Gel 15 Gram(s) Oral once PRN Blood Glucose LESS THAN 70 milliGRAM(s)/deciliter  glucagon  Injectable 1 milliGRAM(s) IntraMuscular once PRN Glucose LESS THAN 70 milligrams/deciliter  simethicone 80 milliGRAM(s) Chew every 6 hours PRN abdominal bloating      Allergies    No Known Drug Allergies  Purell (Rash)    Intolerances        ros  unable to obtain in entirety      Vital Signs Last 24 Hrs  T(C): 36.5 (13 Nov 2018 04:55), Max: 36.9 (12 Nov 2018 09:55)  T(F): 97.7 (13 Nov 2018 04:55), Max: 98.4 (12 Nov 2018 09:55)  HR: 69 (13 Nov 2018 04:55) (60 - 69)  BP: 166/75 (13 Nov 2018 04:55) (131/70 - 166/75)  BP(mean): --  RR: 18 (13 Nov 2018 04:55) (16 - 18)  SpO2: 96% (13 Nov 2018 04:55) (94% - 98%)    PHYSICAL EXAM:  Constitutional: NAD, lying in bed  HEENT: ncat  Neck: No LAD  Respiratory: dec bs  Cardiovascular: S1 and S2, RRR  Gastrointestinal: soft nt mild dt   Extremities: no edema  Vascular: 2+ peripheral pulses  Neurological: awake alert but some confusion  Skin: No rashes      LABS:                        8.3    10.42 )-----------( 396      ( 13 Nov 2018 07:46 )             27.4     11-13    135  |  99  |  26<H>  ----------------------------<  161<H>  4.7   |  27  |  4.60<H>    Ca    8.2<L>      13 Nov 2018 07:46    TPro  7.3  /  Alb  2.0<L>  /  TBili  0.7  /  DBili  x   /  AST  23  /  ALT  19  /  AlkPhos  260<H>  11-12    PT/INR - ( 11 Nov 2018 13:43 )   PT: 13.9 sec;   INR: 1.21 ratio               RADIOLOGY & ADDITIONAL TESTS:  < from: CT Abdomen No Cont (11.12.18 @ 15:42) >    EXAM:  CT ABDOMEN ONLY                            PROCEDURE DATE:  11/12/2018          INTERPRETATION:  CLINICAL STATEMENT: check gb tube position    TECHNIQUE: CT of the abdomen was performed in the axial plane utilizing   thin slices without IV or oral contrast. Sagittal and coronal   reformatting was performed.    COMPARISON: 10/29/2018    FINDINGS:    The lower chest demonstrates coronary artery calcification. There is   linear atelectasis or scar at the lung bases.    The evaluation of theabdominal viscera and the bowel is limited without   contrast.    The liver is grossly unremarkable. The gallbladder is again remarkable   for a large stone. There is inflammatory change surrounding the   gallbladder. A cholecystostomy tube has been placed and appears to be   outside the gallbladder. There is high density material within the common   duct on image 42 of series 2 suspicious for choledocholithiasis.    The spleen, pancreas and adrenal glands are grossly unremarkable.    There is nohydronephrosis or urinary calculus. There is a midpole left   renal cyst posteriorly.    There is no bowel obstruction.  There is no intraperitoneal free air.    There is no free fluid.    The aorta is not aneurysmal. There is no significant abdominal or   retroperitoneal lymphadenopathy.    The bony structures demonstrate degenerative changes of the spine. There   is moderate compression fracture in L2.    IMPRESSION:    Cholecystostomy tube in the right upper quadrant appears outside the   gallbladder consistent with malpositioning of the tube. There is again   evidence of cholelithiasis and inflammatory change surrounding the   gallbladder consistent with cholecystitis. There is suggestion of   choledocholithiasis with a 5 mm stone suggested in the CBD on coronal   image 57 although this was not corroborated on the recent MRCP.  Small left renal cyst  Results were discussed with the referring physician, Dr. Marek Strickland, at 6:40 PM on November 12, 2018. By this time, the tube had   been removed by the interventional radiologist.                ANTONIA PALMER M.D.,ATTENDING RADIOLOGIST  This document has been electronically signed. Nov 12 2018  6:45PM                < end of copied text >

## 2018-11-13 NOTE — PROGRESS NOTE ADULT - ASSESSMENT
Patient is now  status post cholecystotomy tube for biliary sepsis on 10/30.  She had rapid atrial fibrillation on 10/30 and converted to sinus rhythm. She required pressor therapy which has now been discontinued. Her white blood count improved today remains hemodynamically stable. She has chronic renal failure on hemodialysis. Her elevated troponin level may represent a component of subendocardial ischemia without true atherothrombosis. These results are nonspecific in the setting of her kidney disease. Currently afebrile and arousable with no evidence of decompensated heart failure or active ischemic.     Recommend:  - s/p fall with no overt injuries.  Fall precautions.  - cont dual antiplatelet therapy for now for CAD s/p stents  - cont statin   - cont metoprolol  - cont isosorbide mononitrate  - HD per Renal.   - Continue nifedipine 90 mg for HTN as tolerated  -Will continue to follow while inpatient

## 2018-11-13 NOTE — PROGRESS NOTE ADULT - PROBLEM SELECTOR PLAN 2
-AAOX3 today   -neuro checks  -enhanced supervision discontinued -no longer in septic shock. WBC wnl this AM.    -2/2 acute calculus cholecystitis with biliary dilatation with common bile duct stone.   -Blood Cx + for ESBL. E.coli, repeat BC negative   -completed course of iv antibx of ertapenem  -cholecystostomy tube removed on 11/12   -repeat CT on 11/12: suggestion of choledocholithiasis with a 5 mm stone suggested in the CBD  -Patient is for ERCP  -NPO after midnight   -Surgery following   -GI following

## 2018-11-13 NOTE — PROGRESS NOTE ADULT - PROBLEM SELECTOR PLAN 4
s/p stent in 2007. trops are trending down at .308.  -C/W Asa, Plavix, imdur   -cardio following Plan as above

## 2018-11-13 NOTE — PROGRESS NOTE ADULT - PROBLEM SELECTOR PLAN 8
DVT proph Heparin s/q. hold for now in setting of ERCP tomorrow. type 2 stable  -c/w lantus and low dose insulin sliding scale with hypoglycemic protocol  - accuchecks  -HgA1c 9.7

## 2018-11-13 NOTE — PROGRESS NOTE ADULT - PROBLEM SELECTOR PLAN 5
HD MWF, HD today.    Nephro Deion following s/p stent in 2007. trops are trending down at .308.  -C/W Asa, Plavix, imdur   -cardio following

## 2018-11-13 NOTE — PROGRESS NOTE ADULT - ASSESSMENT
67 yo with cholecystitis s/p perc cholecystostomy tube.  tube found to be dislodged and removed yesterday. CT concerning for cbd stone now      mrcp

## 2018-11-13 NOTE — PROGRESS NOTE ADULT - ASSESSMENT
68F with PMH of CAD s/p stent (2007), ESRD on HD (MWF; missed today's session), DM type 2, and HTN presents with fever and admitted with severe sepsis secondary to acute choledocholithiasis, s/p perc cholecystostomy tube. Repeat CT suggestion of choledocholithiasis with a 5 mm stone suggested in the CBD, patient for ERCP tomorrow. 68 annamaria speaking obese F with PMH of CAD s/p stent (2007), ESRD on HD (MWF; missed today's session), DM type 2, and HTN presents with fever and admitted with severe sepsis secondary to acute choledocholithiasis, s/p perc cholecystostomy tube. Repeat CT suggestion of choledocholithiasis with a 5 mm stone suggested in the CBD, patient for ERCP tomorrow.

## 2018-11-13 NOTE — PROGRESS NOTE ADULT - PROBLEM SELECTOR PLAN 6
BP stable   C/w current meds.  BB, isordil and nifedipine. HD MWF, HD today.    Nephro Deion following

## 2018-11-13 NOTE — PROGRESS NOTE ADULT - ASSESSMENT
ESRD on HD, ASHD, Diabetes, HTN, Cholecystitis, s/p Perc cholecystostomy dislodgement. For MRCP.  HD tomorrow. Dietary and PO fluid restriction. Epogen as needed for anemia.  Monitor BP trend. Titrate BP meds as needed.   Monitor blood sugar levels. Insulin coverage as needed. Surgery/G.I. following.

## 2018-11-13 NOTE — PROGRESS NOTE ADULT - SUBJECTIVE AND OBJECTIVE BOX
Patient seen in follow up for ESRD. Events noted, cholecystostomy tube dislodged, scheduled for MRCP. No specific c/o. No pain.    PMH:  ESRD (end stage renal disease) on dialysis  CAD (coronary artery disease)  Diabetes  Hypertension  Pneumonia  Myocardial infarction  Hypertension  Diabetes    MEDICATIONS  (STANDING):  aspirin enteric coated 81 milliGRAM(s) Oral daily  atorvastatin 40 milliGRAM(s) Oral at bedtime  calcium carbonate 1250 mG  + Vitamin D (OsCal 500 + D) 1 Tablet(s) Oral daily  chlorhexidine 2% Cloths 1 Application(s) Topical daily  clopidogrel Tablet 75 milliGRAM(s) Oral daily  dextrose 5%. 1000 milliLiter(s) (50 mL/Hr) IV Continuous <Continuous>  dextrose 50% Injectable 12.5 Gram(s) IV Push once  dextrose 50% Injectable 25 Gram(s) IV Push once  dextrose 50% Injectable 25 Gram(s) IV Push once  docusate sodium 100 milliGRAM(s) Oral three times a day  epoetin analilia Injectable 87185 Unit(s) IV Push <User Schedule>  ferrous    sulfate 325 milliGRAM(s) Oral three times a day  gabapentin 300 milliGRAM(s) Oral daily  insulin lispro (HumaLOG) corrective regimen sliding scale   SubCutaneous Before meals and at bedtime  isosorbide   mononitrate ER Tablet (IMDUR) 30 milliGRAM(s) Oral every 24 hours  isosorbide   mononitrate ER Tablet (IMDUR) 60 milliGRAM(s) Oral <User Schedule>  metoprolol tartrate 50 milliGRAM(s) Oral two times a day  NIFEdipine XL 90 milliGRAM(s) Oral daily  pantoprazole    Tablet 40 milliGRAM(s) Oral before breakfast  polyethylene glycol 3350 17 Gram(s) Oral daily  ursodiol Capsule 300 milliGRAM(s) Oral every 12 hours    MEDICATIONS  (PRN):  acetaminophen   Tablet .. 650 milliGRAM(s) Oral every 6 hours PRN Mild Pain (1 - 3)  dextrose 40% Gel 15 Gram(s) Oral once PRN Blood Glucose LESS THAN 70 milliGRAM(s)/deciliter  glucagon  Injectable 1 milliGRAM(s) IntraMuscular once PRN Glucose LESS THAN 70 milligrams/deciliter  simethicone 80 milliGRAM(s) Chew every 6 hours PRN abdominal bloating    T(C): 36.9 (11-13-18 @ 13:14), Max: 36.9 (11-12-18 @ 09:55)  HR: 62 (11-13-18 @ 13:14) (60 - 69)  BP: 154/50 (11-13-18 @ 13:14) (131/70 - 166/75)  RR: 17 (11-13-18 @ 13:14) (16 - 18)  SpO2: 97% (11-13-18 @ 13:14) (94% - 98%)  Wt(kg): --  I&O's Detail    12 Nov 2018 07:01  -  13 Nov 2018 07:00  --------------------------------------------------------  IN:  Total IN: 0 mL    OUT:    Other: 2000 mL  Total OUT: 2000 mL    Total NET: -2000 mL      PHYSICAL EXAM:  General: NAD  No JVD  Right I.J. Permacath dressed  Respiratory: b/l air entry, clear  Cardiovascular: S1 S2 reg  Gastrointestinal: soft, obese, NT, BS present  Extremities: no edema    CBC Full  -  ( 13 Nov 2018 07:46 )  WBC Count : 10.42 K/uL  Hemoglobin : 8.3 g/dL  Hematocrit : 27.4 %  Platelet Count - Automated : 396 K/uL  Mean Cell Volume : 90.1 fl  Mean Cell Hemoglobin : 27.3 pg  Mean Cell Hemoglobin Concentration : 30.3 gm/dL  Auto Neutrophil # : 5.84 K/uL  Auto Lymphocyte # : 3.18 K/uL  Auto Monocyte # : 0.97 K/uL  Auto Eosinophil # : 0.30 K/uL  Auto Basophil # : 0.04 K/uL  Auto Neutrophil % : 56.0 %  Auto Lymphocyte % : 30.5 %  Auto Monocyte % : 9.3 %  Auto Eosinophil % : 2.9 %  Auto Basophil % : 0.4 %    11-13    135  |  99  |  26<H>  ----------------------------<  161<H>  4.7   |  27  |  4.60<H>    Ca    8.2<L>      13 Nov 2018 07:46    TPro  7.1  /  Alb  1.9<L>  /  TBili  0.6  /  DBili  .40<H>  /  AST  24  /  ALT  17  /  AlkPhos  242<H>  11-13        Sodium, Serum: 135 (11-13 @ 07:46)  Sodium, Serum: 131 (11-12 @ 08:57)    Creatinine, Serum: 4.60 (11-13 @ 07:46)  Creatinine, Serum: 7.70 (11-12 @ 08:57)    Potassium, Serum: 4.7 (11-13 @ 07:46)  Potassium, Serum: 5.5 (11-12 @ 08:57)    Hemoglobin: 8.3 (11-13 @ 07:46)  Hemoglobin: 8.4 (11-12 @ 08:57)

## 2018-11-13 NOTE — PROGRESS NOTE ADULT - PROBLEM SELECTOR PLAN 3
likely multifactorial, sepsis, acute illness, renal insufficiency  hgb trend noted, low but stable  no overt s/s gib  trend h/h, transfuse prn  consider iron studies  ppi qd  if hgb continues to downtrend check fobt

## 2018-11-13 NOTE — PROGRESS NOTE ADULT - PROBLEM SELECTOR PLAN 1
clinically improved  s/p drainage and tube removal  ct noted, ?cbd stone, afebrile, no leukocytosis, lfts improved, further plan of care to be dw attg and surgery  repeat blood/bile fluid cxs ngtd  sp abx  monitor abd exam  diet as tolerated  will follow clinically improved  s/p drainage and tube removal  ct noted, ?cbd stone, afebrile, no leukocytosis, lfts improved  dw surgery no further intervention planned  will recheck lfts, further plan of possible ercp to be determined as pt asymptomatic at present  repeat blood/bile fluid cxs ngtd  sp abx  monitor abd exam  diet as tolerated  above plan of care dw attg and agreeable  dw med attg

## 2018-11-13 NOTE — PROGRESS NOTE ADULT - SUBJECTIVE AND OBJECTIVE BOX
Patient is a 68y old  Female who presents with a chief complaint of Sepsis (13 Nov 2018 16:09)      INTERVAL HPI/OVERNIGHT EVENTS: Patient seen and examined at bedside. States that she is doing well, denies any abdominal pain, N/V/C/D.       T(F): 98.4 (11-13-18 @ 13:14), Max: 98.4 (11-13-18 @ 13:14)  HR: 62 (11-13-18 @ 13:14) (60 - 69)  BP: 154/50 (11-13-18 @ 13:14) (131/70 - 166/75)  RR: 17 (11-13-18 @ 13:14) (16 - 18)  SpO2: 97% (11-13-18 @ 13:14) (96% - 98%)  I&O's Summary    12 Nov 2018 07:01  -  13 Nov 2018 07:00  --------------------------------------------------------  IN: 0 mL / OUT: 2000 mL / NET: -2000 mL        REVIEW OF SYSTEMS:  CONSTITUTIONAL: No fever, weight loss   EYES: No eye pain, visual disturbances, or discharge  ENMT:  No difficulty hearing, tinnitus, vertigo; No sinus or throat pain  RESPIRATORY: No cough, wheezing, chills or hemoptysis; No shortness of breath  CARDIOVASCULAR: No chest pain, palpitations, dizziness, or leg swelling  GASTROINTESTINAL: no abdominal pain, no epigastric pain. No nausea, vomiting, or hematemesis; No diarrhea or constipation. No melena or hematochezia.  GENITOURINARY: No dysuria, frequency, hematuria, or incontinence    PHYSICAL EXAM:  GENERAL: NAD, well-groomed, well-developed  HEAD:  Atraumatic, Normocephalic  NERVOUS SYSTEM:  Alert & Oriented X3, Good concentration  CHEST/LUNG: Clear to percussion bilaterally; No rales, rhonchi, wheezing, or rubs  HEART: Regular rate and rhythm; No murmurs, rubs, or gallops  ABDOMEN: Soft, nontender,  Nondistended; Bowel sounds present  EXTREMITIES: No clubbing, cyanosis, or edema    LABS:                        8.3    10.42 )-----------( 396      ( 13 Nov 2018 07:46 )             27.4     11-13    135  |  99  |  26<H>  ----------------------------<  161<H>  4.7   |  27  |  4.60<H>    Ca    8.2<L>      13 Nov 2018 07:46    TPro  7.1  /  Alb  1.9<L>  /  TBili  0.6  /  DBili  .40<H>  /  AST  24  /  ALT  17  /  AlkPhos  242<H>  11-13        CAPILLARY BLOOD GLUCOSE      POCT Blood Glucose.: 217 mg/dL (13 Nov 2018 12:08)  POCT Blood Glucose.: 155 mg/dL (13 Nov 2018 08:05)  POCT Blood Glucose.: 239 mg/dL (12 Nov 2018 22:07)  POCT Blood Glucose.: 215 mg/dL (12 Nov 2018 18:14)  POCT Blood Glucose.: 184 mg/dL (12 Nov 2018 17:10)              MEDICATIONS  (STANDING):  aspirin enteric coated 81 milliGRAM(s) Oral daily  atorvastatin 40 milliGRAM(s) Oral at bedtime  calcium carbonate 1250 mG  + Vitamin D (OsCal 500 + D) 1 Tablet(s) Oral daily  chlorhexidine 2% Cloths 1 Application(s) Topical daily  clopidogrel Tablet 75 milliGRAM(s) Oral daily  dextrose 5%. 1000 milliLiter(s) (50 mL/Hr) IV Continuous <Continuous>  dextrose 50% Injectable 12.5 Gram(s) IV Push once  dextrose 50% Injectable 25 Gram(s) IV Push once  dextrose 50% Injectable 25 Gram(s) IV Push once  docusate sodium 100 milliGRAM(s) Oral three times a day  epoetin analilia Injectable 67245 Unit(s) IV Push <User Schedule>  ferrous    sulfate 325 milliGRAM(s) Oral three times a day  gabapentin 300 milliGRAM(s) Oral daily  insulin lispro (HumaLOG) corrective regimen sliding scale   SubCutaneous Before meals and at bedtime  isosorbide   mononitrate ER Tablet (IMDUR) 30 milliGRAM(s) Oral every 24 hours  isosorbide   mononitrate ER Tablet (IMDUR) 60 milliGRAM(s) Oral <User Schedule>  metoprolol tartrate 50 milliGRAM(s) Oral two times a day  NIFEdipine XL 90 milliGRAM(s) Oral daily  pantoprazole    Tablet 40 milliGRAM(s) Oral before breakfast  polyethylene glycol 3350 17 Gram(s) Oral daily  ursodiol Capsule 300 milliGRAM(s) Oral every 12 hours    MEDICATIONS  (PRN):  acetaminophen   Tablet .. 650 milliGRAM(s) Oral every 6 hours PRN Mild Pain (1 - 3)  dextrose 40% Gel 15 Gram(s) Oral once PRN Blood Glucose LESS THAN 70 milliGRAM(s)/deciliter  glucagon  Injectable 1 milliGRAM(s) IntraMuscular once PRN Glucose LESS THAN 70 milligrams/deciliter  simethicone 80 milliGRAM(s) Chew every 6 hours PRN abdominal bloating

## 2018-11-13 NOTE — PROGRESS NOTE ADULT - PROBLEM SELECTOR PLAN 1
-no longer in septic shock. WBC wnl this AM.    -2/2 acute calculus cholecystitis with biliary dilatation with common bile duct stone.   -Blood Cx + for ESBL. E.coli, repeat BC negative   -completed course of iv antibx of ertapenem  -cholecystostomy tube removed on 11/12   -repeat CT on 11/12: suggestion of choledocholithiasis with a 5 mm stone suggested in the CBD  -Patient is for ERCP  -NPO after midnight   -Surgery following   -GI following Patient is intermediate risk in setting of CAD, DM,  ESRD, for low/intermediate risk procedure, is medically optimized for procedure with routine hemodynamic monitoring.

## 2018-11-13 NOTE — PROGRESS NOTE ADULT - PROBLEM SELECTOR PLAN 7
type 2 stable  -c/w lantus and low dose insulin sliding scale with hypoglycemic protocol  - accuchecks  -HgA1c 9.7 BP stable   C/w current meds.  BB, isordil and nifedipine.

## 2018-11-13 NOTE — PROGRESS NOTE ADULT - SUBJECTIVE AND OBJECTIVE BOX
[facility] - Glynn Bullock, Susan, Claudia, Flaquita, Terry Mora  Office Number:  [phone]    Patient resting comfortably in bed in NAD.  Laying flat with no respiratory distress.  Offers no complaints.  No overnight events.    F/U for:  CAD         MEDICATIONS  (STANDING):  aspirin enteric coated 81 milliGRAM(s) Oral daily  atorvastatin 40 milliGRAM(s) Oral at bedtime  calcium carbonate 1250 mG  + Vitamin D (OsCal 500 + D) 1 Tablet(s) Oral daily  chlorhexidine 2% Cloths 1 Application(s) Topical daily  clopidogrel Tablet 75 milliGRAM(s) Oral daily  dextrose 5%. 1000 milliLiter(s) (50 mL/Hr) IV Continuous <Continuous>  dextrose 50% Injectable 12.5 Gram(s) IV Push once  dextrose 50% Injectable 25 Gram(s) IV Push once  dextrose 50% Injectable 25 Gram(s) IV Push once  docusate sodium 100 milliGRAM(s) Oral three times a day  epoetin analilia Injectable 39368 Unit(s) IV Push <User Schedule>  ferrous    sulfate 325 milliGRAM(s) Oral three times a day  gabapentin 300 milliGRAM(s) Oral daily  insulin lispro (HumaLOG) corrective regimen sliding scale   SubCutaneous Before meals and at bedtime  isosorbide   mononitrate ER Tablet (IMDUR) 30 milliGRAM(s) Oral every 24 hours  isosorbide   mononitrate ER Tablet (IMDUR) 60 milliGRAM(s) Oral <User Schedule>  metoprolol tartrate 50 milliGRAM(s) Oral two times a day  NIFEdipine XL 90 milliGRAM(s) Oral daily  pantoprazole    Tablet 40 milliGRAM(s) Oral before breakfast  polyethylene glycol 3350 17 Gram(s) Oral daily  ursodiol Capsule 300 milliGRAM(s) Oral every 12 hours    MEDICATIONS  (PRN):  acetaminophen   Tablet .. 650 milliGRAM(s) Oral every 6 hours PRN Mild Pain (1 - 3)  dextrose 40% Gel 15 Gram(s) Oral once PRN Blood Glucose LESS THAN 70 milliGRAM(s)/deciliter  glucagon  Injectable 1 milliGRAM(s) IntraMuscular once PRN Glucose LESS THAN 70 milligrams/deciliter  simethicone 80 milliGRAM(s) Chew every 6 hours PRN abdominal bloating      Allergies    No Known Drug Allergies  Purell (Rash)    Intolerances        Vital Signs Last 24 Hrs  T(C): 36.5 (13 Nov 2018 04:55), Max: 36.9 (12 Nov 2018 09:55)  T(F): 97.7 (13 Nov 2018 04:55), Max: 98.4 (12 Nov 2018 09:55)  HR: 69 (13 Nov 2018 04:55) (60 - 69)  BP: 166/75 (13 Nov 2018 04:55) (131/70 - 166/75)  BP(mean): --  RR: 18 (13 Nov 2018 04:55) (16 - 18)  SpO2: 96% (13 Nov 2018 04:55) (94% - 98%)    I&O's Summary    12 Nov 2018 07:01  -  13 Nov 2018 07:00  --------------------------------------------------------  IN: 0 mL / OUT: 2000 mL / NET: -2000 mL        ON EXAM:    General: NAD, awake and alert  HEENT: Mucous membranes are moist, anicteric  Lungs: Non-labored, breath sounds are clear bilaterally, No wheezing, rales or rhonchi  Cardiovascular: Regular, S1 and S2, no murmurs, rubs, or gallops  Gastrointestinal: Bowel Sounds present, soft, nontender.   Lymph: No peripheral edema. No lymphadenopathy.  Skin: No rashes or ulcers     LABS: All Labs Reviewed:                        8.3    10.42 )-----------( 396      ( 13 Nov 2018 07:46 )             27.4                         8.4    12.15 )-----------( 403      ( 12 Nov 2018 08:57 )             27.5     13 Nov 2018 07:46    135    |  99     |  26     ----------------------------<  161    4.7     |  27     |  4.60   12 Nov 2018 08:57    131    |  95     |  57     ----------------------------<  185    5.5     |  27     |  7.70     Ca    8.2        13 Nov 2018 07:46  Ca    8.1        12 Nov 2018 08:57    TPro  7.3    /  Alb  2.0    /  TBili  0.7    /  DBili  x      /  AST  23     /  ALT  19     /  AlkPhos  260    12 Nov 2018 08:57    PT/INR - ( 11 Nov 2018 13:43 )   PT: 13.9 sec;   INR: 1.21 ratio

## 2018-11-14 DIAGNOSIS — Z01.818 ENCOUNTER FOR OTHER PREPROCEDURAL EXAMINATION: ICD-10-CM

## 2018-11-14 LAB
ALBUMIN SERPL ELPH-MCNC: 1.9 G/DL — LOW (ref 3.3–5)
ALP SERPL-CCNC: 232 U/L — HIGH (ref 40–120)
ALT FLD-CCNC: 15 U/L — SIGNIFICANT CHANGE UP (ref 12–78)
ANION GAP SERPL CALC-SCNC: 10 MMOL/L — SIGNIFICANT CHANGE UP (ref 5–17)
AST SERPL-CCNC: 17 U/L — SIGNIFICANT CHANGE UP (ref 15–37)
BASOPHILS # BLD AUTO: 0.04 K/UL — SIGNIFICANT CHANGE UP (ref 0–0.2)
BASOPHILS NFR BLD AUTO: 0.4 % — SIGNIFICANT CHANGE UP (ref 0–2)
BILIRUB DIRECT SERPL-MCNC: 0.3 MG/DL — HIGH (ref 0.05–0.2)
BILIRUB INDIRECT FLD-MCNC: 0.2 MG/DL — SIGNIFICANT CHANGE UP (ref 0.2–1)
BILIRUB SERPL-MCNC: 0.5 MG/DL — SIGNIFICANT CHANGE UP (ref 0.2–1.2)
BUN SERPL-MCNC: 37 MG/DL — HIGH (ref 7–23)
CALCIUM SERPL-MCNC: 8.4 MG/DL — LOW (ref 8.5–10.1)
CHLORIDE SERPL-SCNC: 96 MMOL/L — SIGNIFICANT CHANGE UP (ref 96–108)
CO2 SERPL-SCNC: 28 MMOL/L — SIGNIFICANT CHANGE UP (ref 22–31)
CREAT SERPL-MCNC: 6 MG/DL — HIGH (ref 0.5–1.3)
EOSINOPHIL # BLD AUTO: 0.27 K/UL — SIGNIFICANT CHANGE UP (ref 0–0.5)
EOSINOPHIL NFR BLD AUTO: 2.6 % — SIGNIFICANT CHANGE UP (ref 0–6)
GLUCOSE SERPL-MCNC: 149 MG/DL — HIGH (ref 70–99)
HCT VFR BLD CALC: 27.3 % — LOW (ref 34.5–45)
HGB BLD-MCNC: 8.3 G/DL — LOW (ref 11.5–15.5)
IMM GRANULOCYTES NFR BLD AUTO: 0.8 % — SIGNIFICANT CHANGE UP (ref 0–1.5)
INR BLD: 1.23 RATIO — HIGH (ref 0.88–1.16)
LYMPHOCYTES # BLD AUTO: 2.98 K/UL — SIGNIFICANT CHANGE UP (ref 1–3.3)
LYMPHOCYTES # BLD AUTO: 28.8 % — SIGNIFICANT CHANGE UP (ref 13–44)
MCHC RBC-ENTMCNC: 27.4 PG — SIGNIFICANT CHANGE UP (ref 27–34)
MCHC RBC-ENTMCNC: 30.4 GM/DL — LOW (ref 32–36)
MCV RBC AUTO: 90.1 FL — SIGNIFICANT CHANGE UP (ref 80–100)
MONOCYTES # BLD AUTO: 0.83 K/UL — SIGNIFICANT CHANGE UP (ref 0–0.9)
MONOCYTES NFR BLD AUTO: 8 % — SIGNIFICANT CHANGE UP (ref 2–14)
NEUTROPHILS # BLD AUTO: 6.14 K/UL — SIGNIFICANT CHANGE UP (ref 1.8–7.4)
NEUTROPHILS NFR BLD AUTO: 59.4 % — SIGNIFICANT CHANGE UP (ref 43–77)
PHOSPHATE SERPL-MCNC: 4.7 MG/DL — HIGH (ref 2.5–4.5)
PLATELET # BLD AUTO: 371 K/UL — SIGNIFICANT CHANGE UP (ref 150–400)
POTASSIUM SERPL-MCNC: 4.8 MMOL/L — SIGNIFICANT CHANGE UP (ref 3.5–5.3)
POTASSIUM SERPL-SCNC: 4.8 MMOL/L — SIGNIFICANT CHANGE UP (ref 3.5–5.3)
PROT SERPL-MCNC: 6.9 G/DL — SIGNIFICANT CHANGE UP (ref 6–8.3)
PROTHROM AB SERPL-ACNC: 14.1 SEC — HIGH (ref 10–12.9)
RBC # BLD: 3.03 M/UL — LOW (ref 3.8–5.2)
RBC # FLD: 16.8 % — HIGH (ref 10.3–14.5)
SODIUM SERPL-SCNC: 134 MMOL/L — LOW (ref 135–145)
WBC # BLD: 10.34 K/UL — SIGNIFICANT CHANGE UP (ref 3.8–10.5)
WBC # FLD AUTO: 10.34 K/UL — SIGNIFICANT CHANGE UP (ref 3.8–10.5)

## 2018-11-14 PROCEDURE — 99232 SBSQ HOSP IP/OBS MODERATE 35: CPT

## 2018-11-14 PROCEDURE — 99233 SBSQ HOSP IP/OBS HIGH 50: CPT | Mod: GC

## 2018-11-14 RX ORDER — INSULIN LISPRO 100/ML
3 VIAL (ML) SUBCUTANEOUS ONCE
Qty: 0 | Refills: 0 | Status: COMPLETED | OUTPATIENT
Start: 2018-11-14 | End: 2018-11-14

## 2018-11-14 RX ORDER — ONDANSETRON 8 MG/1
4 TABLET, FILM COATED ORAL ONCE
Qty: 0 | Refills: 0 | Status: COMPLETED | OUTPATIENT
Start: 2018-11-14 | End: 2018-11-15

## 2018-11-14 RX ORDER — SODIUM CHLORIDE 9 MG/ML
1000 INJECTION INTRAMUSCULAR; INTRAVENOUS; SUBCUTANEOUS
Qty: 0 | Refills: 0 | Status: DISCONTINUED | OUTPATIENT
Start: 2018-11-14 | End: 2018-11-14

## 2018-11-14 RX ORDER — MORPHINE SULFATE 50 MG/1
2 CAPSULE, EXTENDED RELEASE ORAL
Qty: 0 | Refills: 0 | Status: DISCONTINUED | OUTPATIENT
Start: 2018-11-14 | End: 2018-11-14

## 2018-11-14 RX ADMIN — URSODIOL 300 MILLIGRAM(S): 250 TABLET, FILM COATED ORAL at 21:38

## 2018-11-14 RX ADMIN — Medication 2: at 22:37

## 2018-11-14 RX ADMIN — SODIUM CHLORIDE 50 MILLILITER(S): 9 INJECTION INTRAMUSCULAR; INTRAVENOUS; SUBCUTANEOUS at 20:21

## 2018-11-14 RX ADMIN — Medication 50 MILLIGRAM(S): at 21:40

## 2018-11-14 RX ADMIN — Medication 100 MILLIGRAM(S): at 06:00

## 2018-11-14 RX ADMIN — CLOPIDOGREL BISULFATE 75 MILLIGRAM(S): 75 TABLET, FILM COATED ORAL at 12:20

## 2018-11-14 RX ADMIN — ISOSORBIDE MONONITRATE 30 MILLIGRAM(S): 60 TABLET, EXTENDED RELEASE ORAL at 21:38

## 2018-11-14 RX ADMIN — Medication 90 MILLIGRAM(S): at 06:00

## 2018-11-14 RX ADMIN — PANTOPRAZOLE SODIUM 40 MILLIGRAM(S): 20 TABLET, DELAYED RELEASE ORAL at 06:00

## 2018-11-14 RX ADMIN — Medication 325 MILLIGRAM(S): at 06:00

## 2018-11-14 RX ADMIN — Medication 325 MILLIGRAM(S): at 21:38

## 2018-11-14 RX ADMIN — GABAPENTIN 300 MILLIGRAM(S): 400 CAPSULE ORAL at 12:20

## 2018-11-14 RX ADMIN — Medication 50 MILLIGRAM(S): at 05:59

## 2018-11-14 RX ADMIN — Medication 81 MILLIGRAM(S): at 12:20

## 2018-11-14 RX ADMIN — Medication 1 TABLET(S): at 12:20

## 2018-11-14 RX ADMIN — Medication 3 UNIT(S): at 20:18

## 2018-11-14 RX ADMIN — URSODIOL 300 MILLIGRAM(S): 250 TABLET, FILM COATED ORAL at 05:59

## 2018-11-14 RX ADMIN — Medication 2: at 12:21

## 2018-11-14 RX ADMIN — CHLORHEXIDINE GLUCONATE 1 APPLICATION(S): 213 SOLUTION TOPICAL at 12:20

## 2018-11-14 RX ADMIN — ISOSORBIDE MONONITRATE 60 MILLIGRAM(S): 60 TABLET, EXTENDED RELEASE ORAL at 05:59

## 2018-11-14 RX ADMIN — Medication 100 MILLIGRAM(S): at 21:38

## 2018-11-14 RX ADMIN — POLYETHYLENE GLYCOL 3350 17 GRAM(S): 17 POWDER, FOR SOLUTION ORAL at 12:22

## 2018-11-14 RX ADMIN — ATORVASTATIN CALCIUM 40 MILLIGRAM(S): 80 TABLET, FILM COATED ORAL at 21:38

## 2018-11-14 NOTE — PROGRESS NOTE ADULT - SUBJECTIVE AND OBJECTIVE BOX
Patient is a 68y old  Female who presents with a chief complaint of Sepsis (14 Nov 2018 09:25)      INTERVAL HPI/OVERNIGHT EVENTS: Patient seen and examined at bedside.  ID: [id number]. Patient states that she feels well, denies any abdominal pain, SOB, chest pain, N/V/D/C.  Patient is for ERCP today, procedures discussed with patient, patient informed that she is intermediate risk for low risk procedure. Patient cannot have dialysis today due to procedure, discussed with renal and patient, to have dialysis tomorrow.      T(F): 97.8 (11-14-18 @ 09:07), Max: 98.4 (11-13-18 @ 13:14)  HR: 58 (11-14-18 @ 09:07) (58 - 69)  BP: 158/66 (11-14-18 @ 09:07) (136/88 - 171/65)  RR: 17 (11-14-18 @ 09:07) (17 - 18)  SpO2: 97% (11-14-18 @ 09:07) (94% - 97%)  I&O's Summary      REVIEW OF SYSTEMS:  CONSTITUTIONAL: No fever, weight loss   EYES: No eye pain, visual disturbances, or discharge  RESPIRATORY: No cough, wheezing, chills or hemoptysis; No shortness of breath  CARDIOVASCULAR: No chest pain, palpitations, dizziness, or leg swelling  GASTROINTESTINAL: no abdominal pain, no epigastric pain. No nausea, vomiting, or hematemesis; No diarrhea or constipation. No melena or hematochezia.  GENITOURINARY: No dysuria, frequency, hematuria, or incontinence    PHYSICAL EXAM:  GENERAL: NAD, well-groomed, well-developed  HEAD:  Atraumatic, Normocephalic  NERVOUS SYSTEM:  Alert & Oriented, Good concentration    CHEST/LUNG: Clear to percussion bilaterally; No rales, rhonchi, wheezing, or rubs  HEART: Regular rate and rhythm; No murmurs, rubs, or gallops  ABDOMEN: Soft, nontender,  Nondistended; Bowel sounds present  EXTREMITIES: No clubbing, cyanosis, or edema    LABS:                        8.3    10.34 )-----------( 371      ( 14 Nov 2018 08:27 )             27.3     11-14    134<L>  |  96  |  37<H>  ----------------------------<  149<H>  4.8   |  28  |  6.00<H>    Ca    8.4<L>      14 Nov 2018 08:27  Phos  4.7     11-14    TPro  6.9  /  Alb  1.9<L>  /  TBili  0.5  /  DBili  .30<H>  /  AST  17  /  ALT  15  /  AlkPhos  232<H>  11-14    PT/INR - ( 14 Nov 2018 08:27 )   PT: 14.1 sec;   INR: 1.23 ratio             CAPILLARY BLOOD GLUCOSE      POCT Blood Glucose.: 153 mg/dL (14 Nov 2018 07:56)  POCT Blood Glucose.: 178 mg/dL (13 Nov 2018 21:21)  POCT Blood Glucose.: 286 mg/dL (13 Nov 2018 17:24)  POCT Blood Glucose.: 217 mg/dL (13 Nov 2018 12:08)              MEDICATIONS  (STANDING):  aspirin enteric coated 81 milliGRAM(s) Oral daily  atorvastatin 40 milliGRAM(s) Oral at bedtime  calcium carbonate 1250 mG  + Vitamin D (OsCal 500 + D) 1 Tablet(s) Oral daily  chlorhexidine 2% Cloths 1 Application(s) Topical daily  clopidogrel Tablet 75 milliGRAM(s) Oral daily  dextrose 5%. 1000 milliLiter(s) (50 mL/Hr) IV Continuous <Continuous>  dextrose 50% Injectable 12.5 Gram(s) IV Push once  dextrose 50% Injectable 25 Gram(s) IV Push once  dextrose 50% Injectable 25 Gram(s) IV Push once  docusate sodium 100 milliGRAM(s) Oral three times a day  epoetin analilia Injectable 98640 Unit(s) IV Push <User Schedule>  ferrous    sulfate 325 milliGRAM(s) Oral three times a day  gabapentin 300 milliGRAM(s) Oral daily  insulin lispro (HumaLOG) corrective regimen sliding scale   SubCutaneous Before meals and at bedtime  isosorbide   mononitrate ER Tablet (IMDUR) 30 milliGRAM(s) Oral every 24 hours  isosorbide   mononitrate ER Tablet (IMDUR) 60 milliGRAM(s) Oral <User Schedule>  metoprolol tartrate 50 milliGRAM(s) Oral two times a day  NIFEdipine XL 90 milliGRAM(s) Oral daily  pantoprazole    Tablet 40 milliGRAM(s) Oral before breakfast  polyethylene glycol 3350 17 Gram(s) Oral daily  ursodiol Capsule 300 milliGRAM(s) Oral every 12 hours    MEDICATIONS  (PRN):  acetaminophen   Tablet .. 650 milliGRAM(s) Oral every 6 hours PRN Mild Pain (1 - 3)  dextrose 40% Gel 15 Gram(s) Oral once PRN Blood Glucose LESS THAN 70 milliGRAM(s)/deciliter  glucagon  Injectable 1 milliGRAM(s) IntraMuscular once PRN Glucose LESS THAN 70 milligrams/deciliter  simethicone 80 milliGRAM(s) Chew every 6 hours PRN abdominal bloating    < from: CT Abdomen No Cont (11.12.18 @ 15:42) >  IMPRESSION:    Cholecystostomy tube in the right upper quadrant appears outside the   gallbladder consistent with malpositioning of the tube. There is again   evidence of cholelithiasis and inflammatory change surrounding the   gallbladder consistent with cholecystitis. There is suggestion of   choledocholithiasis with a 5 mm stone suggested in the CBD on coronal   image 57 although this was not corroborated on the recent MRCP.  Small left renal cyst  Results were discussed with the referring physician, Dr. Marek Strickland, at 6:40 PM on November 12, 2018. By this time, the tube had   been removed by the interventional radiologist.      ANTONIA PALMER M.D.,ATTENDING RADIOLOGIST  This document has been electronically signed. Nov 12 2018  6:45PM    < end of copied text >

## 2018-11-14 NOTE — PROGRESS NOTE ADULT - ASSESSMENT
Patient is now status post cholecystotomy tube for biliary sepsis on 10/30.  She had rapid atrial fibrillation on 10/30 and converted to sinus rhythm. She required pressor therapy which has now been discontinued. Her white blood count improved today remains hemodynamically stable. She has chronic renal failure on hemodialysis. Her elevated troponin level may represent a component of subendocardial ischemia without true atherothrombosis. These results are nonspecific in the setting of her kidney disease. Currently afebrile and arousable with no evidence of decompensated heart failure or active ischemic.     Recommend:  - s/p fall with no overt injuries.  Fall precautions.  - cont dual antiplatelet therapy for now for CAD s/p stents  - cont statin   - cont metoprolol  - cont isosorbide mononitrate  - HD per Renal.   - Continue nifedipine 90 mg for HTN as tolerated  - Will continue to follow while inpatient Patient is now status post cholecystotomy tube for biliary sepsis on 10/30.  She had rapid atrial fibrillation on 10/30 and converted to sinus rhythm. She required pressor therapy which has now been discontinued. Her white blood count improved today remains hemodynamically stable. She has chronic renal failure on hemodialysis. Her elevated troponin level may represent a component of subendocardial ischemia without true atherothrombosis. These results are nonspecific in the setting of her kidney disease. Currently afebrile and arousable with no evidence of decompensated heart failure or active ischemic.     Recommend:  - s/p fall with no overt injuries.  Fall precautions.  - cont dual antiplatelet therapy for now for CAD s/p stents  - cont statin   - cont metoprolol  - cont isosorbide mononitrate  - HD per Renal.   - Continue nifedipine 90 mg for HTN as tolerated  - no cardiac contraindication to proceed with ERCP today. Routine monitoring is recommended.  - Will continue to follow while inpatient

## 2018-11-14 NOTE — PROGRESS NOTE ADULT - ASSESSMENT
68 annamaria speaking obese F with PMH of CAD s/p stent (2007), ESRD on HD (MWF; missed today's session), DM type 2, and HTN presents with fever and admitted with severe sepsis secondary to acute choledocholithiasis, s/p perc cholecystostomy tube. Repeat CT suggestion of choledocholithiasis with a 5 mm stone suggested in the CBD, patient for ERCP today.

## 2018-11-14 NOTE — PROGRESS NOTE ADULT - PROBLEM SELECTOR PLAN 9
DVT proph Heparin s/q. hold for now in setting of ERCP tomorrow. DVT proph Heparin s/q. hold for now in setting of ERCP today

## 2018-11-14 NOTE — PROGRESS NOTE ADULT - PROBLEM SELECTOR PLAN 6
BP stable   C/w current meds.  BB, isordil and nifedipine. -HD MWF. Will hold off on dialysis today in setting of ERCP, discussed with Dr. Albarran and patient, will have dialysis tomorrow.    -Nephro Deion following

## 2018-11-14 NOTE — PROGRESS NOTE ADULT - PROBLEM SELECTOR PLAN 1
-no longer in septic shock. WBC wnl this AM.    -2/2 acute calculus cholecystitis with biliary dilatation with common bile duct stone.   -Blood Cx + for ESBL. E.coli, repeat BC negative   -completed course of iv antibx of ertapenem  -cholecystostomy tube removed on 11/12   -repeat CT on 11/12: suggestion of choledocholithiasis with a 5 mm stone suggested in the CBD  -Patient is for ERCP today   -NPO until after procedure   -Surgery following   -GI following Patient is intermediate risk in setting of CAD, DM,  ESRD, for low/intermediate risk procedure, is medically optimized for procedure with routine hemodynamic monitoring.

## 2018-11-14 NOTE — PROGRESS NOTE ADULT - SUBJECTIVE AND OBJECTIVE BOX
Patient is a 68y old  Female who presents with a chief complaint of Sepsis (31 Oct 2018 07:06)  Patient seen in follow up for ESRD on HD. s/p Perc Cholecystostomy. + blood cultures ESBL     No new complaints. HD rescheduled to accomodate ERCP.     PAST MEDICAL HISTORY:  ESRD (end stage renal disease) on dialysis  CAD (coronary artery disease)  Diabetes  CRF (chronic renal failure)  Hypertension  Pneumonia  Myocardial infarction  Hypertension  Diabetes       MEDICATIONS  (STANDING):  aspirin enteric coated 81 milliGRAM(s) Oral daily  atorvastatin 40 milliGRAM(s) Oral at bedtime  calcium carbonate 1250 mG  + Vitamin D (OsCal 500 + D) 1 Tablet(s) Oral daily  chlorhexidine 2% Cloths 1 Application(s) Topical daily  clopidogrel Tablet 75 milliGRAM(s) Oral daily  dextrose 5%. 1000 milliLiter(s) (50 mL/Hr) IV Continuous <Continuous>  dextrose 50% Injectable 12.5 Gram(s) IV Push once  dextrose 50% Injectable 25 Gram(s) IV Push once  dextrose 50% Injectable 25 Gram(s) IV Push once  docusate sodium 100 milliGRAM(s) Oral three times a day  epoetin analilia Injectable 62994 Unit(s) IV Push <User Schedule>  ferrous    sulfate 325 milliGRAM(s) Oral three times a day  gabapentin 300 milliGRAM(s) Oral daily  insulin lispro (HumaLOG) corrective regimen sliding scale   SubCutaneous Before meals and at bedtime  isosorbide   mononitrate ER Tablet (IMDUR) 30 milliGRAM(s) Oral every 24 hours  isosorbide   mononitrate ER Tablet (IMDUR) 60 milliGRAM(s) Oral <User Schedule>  metoprolol tartrate 50 milliGRAM(s) Oral two times a day  NIFEdipine XL 90 milliGRAM(s) Oral daily  pantoprazole    Tablet 40 milliGRAM(s) Oral before breakfast  polyethylene glycol 3350 17 Gram(s) Oral daily  ursodiol Capsule 300 milliGRAM(s) Oral every 12 hours    MEDICATIONS  (PRN):  acetaminophen   Tablet .. 650 milliGRAM(s) Oral every 6 hours PRN Mild Pain (1 - 3)  dextrose 40% Gel 15 Gram(s) Oral once PRN Blood Glucose LESS THAN 70 milliGRAM(s)/deciliter  glucagon  Injectable 1 milliGRAM(s) IntraMuscular once PRN Glucose LESS THAN 70 milligrams/deciliter  simethicone 80 milliGRAM(s) Chew every 6 hours PRN abdominal bloating    T(C): 36.9 (11-14-18 @ 13:25), Max: 36.9 (11-13-18 @ 13:14)  HR: 73 (11-14-18 @ 13:25) (58 - 73)  BP: 175/54 (11-14-18 @ 13:25) (131/70 - 175/54)  RR: 16 (11-14-18 @ 13:25)  SpO2: 94% (11-14-18 @ 13:25)  Wt(kg): --  I&O's Detail      PHYSICAL EXAM:  General: NAD, Rt chest dialysis catheter  Respiratory: b/l air entry  Cardiovascular: S1 S2  Gastrointestinal: soft, cholecystostomy tube  Extremities:  no edema      LABORATORY:                        8.3    10.34 )-----------( 371      ( 14 Nov 2018 08:27 )             27.3     11-14    134<L>  |  96  |  37<H>  ----------------------------<  149<H>  4.8   |  28  |  6.00<H>    Ca    8.4<L>      14 Nov 2018 08:27  Phos  4.7     11-14    TPro  6.9  /  Alb  1.9<L>  /  TBili  0.5  /  DBili  .30<H>  /  AST  17  /  ALT  15  /  AlkPhos  232<H>  11-14    Sodium, Serum: 134 mmol/L (11-14 @ 08:27)  Sodium, Serum: 135 mmol/L (11-13 @ 07:46)    Potassium, Serum: 4.8 mmol/L (11-14 @ 08:27)  Potassium, Serum: 4.7 mmol/L (11-13 @ 07:46)    Hemoglobin: 8.3 g/dL (11-14 @ 08:27)  Hemoglobin: 8.3 g/dL (11-13 @ 07:46)  Hemoglobin: 8.4 g/dL (11-12 @ 08:57)    Creatinine, Serum 6.00 (11-14 @ 08:27)  Creatinine, Serum 4.60 (11-13 @ 07:46)  Creatinine, Serum 7.70 (11-12 @ 08:57)        LIVER FUNCTIONS - ( 14 Nov 2018 08:27 )  Alb: 1.9 g/dL / Pro: 6.9 g/dL / ALK PHOS: 232 U/L / ALT: 15 U/L / AST: 17 U/L / GGT: x

## 2018-11-14 NOTE — PROGRESS NOTE ADULT - PROBLEM SELECTOR PLAN 5
-HD MWF. Will hold off on dialysis today in setting of ERCP, discussed with Dr. Albarran and patient, will have dialysis tomorrow.    -Nephro Deion following s/p stent in 2007. trops are trending down at .308.  -C/W Asa, Plavix, imdur   -cardio following

## 2018-11-14 NOTE — PROGRESS NOTE ADULT - SUBJECTIVE AND OBJECTIVE BOX
[facility] - Glynn Bullock, Susan, Claudia, Flaquita, Terry Mora  Office Number:  [phone]    Patient resting comfortably in bed in NAD.  Laying flat with no respiratory distress.  No complaints of chest pain, dyspnea, palpitations, PND, or orthopnea.    ROS: negative unless otherwise mentioned.    Telemetry:      MEDICATIONS  (STANDING):  aspirin enteric coated 81 milliGRAM(s) Oral daily  atorvastatin 40 milliGRAM(s) Oral at bedtime  calcium carbonate 1250 mG  + Vitamin D (OsCal 500 + D) 1 Tablet(s) Oral daily  chlorhexidine 2% Cloths 1 Application(s) Topical daily  clopidogrel Tablet 75 milliGRAM(s) Oral daily  dextrose 5%. 1000 milliLiter(s) (50 mL/Hr) IV Continuous <Continuous>  dextrose 50% Injectable 12.5 Gram(s) IV Push once  dextrose 50% Injectable 25 Gram(s) IV Push once  dextrose 50% Injectable 25 Gram(s) IV Push once  docusate sodium 100 milliGRAM(s) Oral three times a day  epoetin analilia Injectable 78559 Unit(s) IV Push <User Schedule>  ferrous    sulfate 325 milliGRAM(s) Oral three times a day  gabapentin 300 milliGRAM(s) Oral daily  insulin lispro (HumaLOG) corrective regimen sliding scale   SubCutaneous Before meals and at bedtime  isosorbide   mononitrate ER Tablet (IMDUR) 30 milliGRAM(s) Oral every 24 hours  isosorbide   mononitrate ER Tablet (IMDUR) 60 milliGRAM(s) Oral <User Schedule>  metoprolol tartrate 50 milliGRAM(s) Oral two times a day  NIFEdipine XL 90 milliGRAM(s) Oral daily  pantoprazole    Tablet 40 milliGRAM(s) Oral before breakfast  polyethylene glycol 3350 17 Gram(s) Oral daily  ursodiol Capsule 300 milliGRAM(s) Oral every 12 hours    MEDICATIONS  (PRN):  acetaminophen   Tablet .. 650 milliGRAM(s) Oral every 6 hours PRN Mild Pain (1 - 3)  dextrose 40% Gel 15 Gram(s) Oral once PRN Blood Glucose LESS THAN 70 milliGRAM(s)/deciliter  glucagon  Injectable 1 milliGRAM(s) IntraMuscular once PRN Glucose LESS THAN 70 milligrams/deciliter  simethicone 80 milliGRAM(s) Chew every 6 hours PRN abdominal bloating      Allergies    No Known Drug Allergies  Purell (Rash)    Intolerances        Vital Signs Last 24 Hrs  T(C): 36.6 (14 Nov 2018 09:07), Max: 36.9 (13 Nov 2018 13:14)  T(F): 97.8 (14 Nov 2018 09:07), Max: 98.4 (13 Nov 2018 13:14)  HR: 58 (14 Nov 2018 09:07) (58 - 69)  BP: 158/66 (14 Nov 2018 09:07) (136/88 - 171/65)  BP(mean): --  RR: 17 (14 Nov 2018 09:07) (17 - 18)  SpO2: 97% (14 Nov 2018 09:07) (94% - 97%)    I&O's Summary      ON EXAM:    General: NAD, awake and alert, obese  HEENT: Mucous membranes are moist, anicteric  Lungs: Non-labored, breath sounds are clear bilaterally, No wheezing, rales or rhonchi  Cardiovascular: Regular, S1 and S2, no murmurs, rubs, or gallops  Gastrointestinal: Bowel Sounds present, soft, nontender.   Lymph: No peripheral edema. No lymphadenopathy.  Skin: No rashes or ulcers    LABS: All Labs Reviewed:                        8.3    10.34 )-----------( 371      ( 14 Nov 2018 08:27 )             27.3                         8.3    10.42 )-----------( 396      ( 13 Nov 2018 07:46 )             27.4                         8.4    12.15 )-----------( 403      ( 12 Nov 2018 08:57 )             27.5     14 Nov 2018 08:27    134    |  96     |  37     ----------------------------<  149    4.8     |  28     |  6.00   13 Nov 2018 07:46    135    |  99     |  26     ----------------------------<  161    4.7     |  27     |  4.60   12 Nov 2018 08:57    131    |  95     |  57     ----------------------------<  185    5.5     |  27     |  7.70     Ca    8.4        14 Nov 2018 08:27  Ca    8.2        13 Nov 2018 07:46  Ca    8.1        12 Nov 2018 08:57  Phos  4.7       14 Nov 2018 08:27    TPro  6.9    /  Alb  1.9    /  TBili  0.5    /  DBili  .30    /  AST  17     /  ALT  15     /  AlkPhos  232    14 Nov 2018 08:27  TPro  7.1    /  Alb  1.9    /  TBili  0.6    /  DBili  .40    /  AST  24     /  ALT  17     /  AlkPhos  242    13 Nov 2018 07:46  TPro  7.3    /  Alb  2.0    /  TBili  0.7    /  DBili  x      /  AST  23     /  ALT  19     /  AlkPhos  260    12 Nov 2018 08:57    PT/INR - ( 14 Nov 2018 08:27 )   PT: 14.1 sec;   INR: 1.23 ratio               Blood Culture:

## 2018-11-14 NOTE — PROGRESS NOTE ADULT - SUBJECTIVE AND OBJECTIVE BOX
Pt seen and examined. Pt denies any overnight events. Pt reports pain is well controlled. Pt reports passing flatus. Pt denies any nausea/vomiting. (+) BM. Pt reports ambulating with assistance. Tolerating regular diet.    Vital Signs Last 24 Hrs  T(C): 36.6 (14 Nov 2018 09:07), Max: 36.9 (13 Nov 2018 13:14)  T(F): 97.8 (14 Nov 2018 09:07), Max: 98.4 (13 Nov 2018 13:14)  HR: 58 (14 Nov 2018 09:07) (58 - 69)  BP: 158/66 (14 Nov 2018 09:07) (136/88 - 171/65)  BP(mean): --  RR: 17 (14 Nov 2018 09:07) (17 - 18)  SpO2: 97% (14 Nov 2018 09:07) (94% - 97%)    POCT Blood Glucose.: 192 mg/dL (14 Nov 2018 11:59)  POCT Blood Glucose.: 153 mg/dL (14 Nov 2018 07:56)  POCT Blood Glucose.: 178 mg/dL (13 Nov 2018 21:21)  POCT Blood Glucose.: 286 mg/dL (13 Nov 2018 17:24)  POCT Blood Glucose.: 217 mg/dL (13 Nov 2018 12:08)    11-14    134<L>  |  96  |  37<H>  ----------------------------<  149<H>  4.8   |  28  |  6.00<H>    Ca    8.4<L>      14 Nov 2018 08:27  Phos  4.7     11-14    TPro  6.9  /  Alb  1.9<L>  /  TBili  0.5  /  DBili  .30<H>  /  AST  17  /  ALT  15  /  AlkPhos  232<H>  11-14    LIVER FUNCTIONS - ( 14 Nov 2018 08:27 )  Alb: 1.9 g/dL / Pro: 6.9 g/dL / ALK PHOS: 232 U/L / ALT: 15 U/L / AST: 17 U/L / GGT: x             PT/INR - ( 14 Nov 2018 08:27 )   PT: 14.1 sec;   INR: 1.23 ratio      Exam: Pt seen, sitting comfortably in bed in no distress  Abd- Soft, non-distended. (+) RUQ region TTP on deep palpation non-tender, (  ) distention. ( ) BS  Incision- clean, dry and intact    A/P S/P POD# HEALTH ISSUES - PROBLEM Dx:  Pre-op evaluation: Pre-op evaluation  Altered mental status, unspecified: Altered mental status, unspecified  Anemia: Anemia  Cholangitis concurrent with and due to calculus of gallbladder: Cholangitis concurrent with and due to calculus of gallbladder  Septicemia due to E. coli: Septicemia due to E. coli  Cholangitis: Cholangitis  Septic shock: Septic shock  Need for prophylactic measure: Need for prophylactic measure  Diabetes: Diabetes  Hypertension: Hypertension  ESRD (end stage renal disease) on dialysis: ESRD (end stage renal disease) on dialysis  CAD (coronary artery disease): CAD (coronary artery disease)  Sepsis: Sepsis  Cholecystitis, acute: Cholecystitis, acute    68 Y.O. F s/p removal of cholecystostomy, clinically improved however CT abd/pelvis revealed CBD- pt schedule for ERCP today.    1) Continue analgesia  2) Continue DVT prophylaxis with Plavix & [id number]  3) OOB, ambulate with assistance  5) NPO for ERCP today. Pt will have to wait 4 hrs after dialysis completed prior to OR- discussed with Dr. Strickland who will coordinate. Pt scheduled for ERCP at 4 pm.  6) Discharge planning

## 2018-11-14 NOTE — PROGRESS NOTE ADULT - PROBLEM SELECTOR PLAN 2
-AAOX3 today   -neuro checks -no longer in septic shock. WBC wnl this AM.    -2/2 acute calculus cholecystitis with biliary dilatation with common bile duct stone.   -Blood Cx + for ESBL. E.coli, repeat BC negative   -completed course of iv antibx of ertapenem  -cholecystostomy tube removed on 11/12   -repeat CT on 11/12: suggestion of choledocholithiasis with a 5 mm stone suggested in the CBD  -Patient is for ERCP today   -NPO until after procedure   -Surgery following   -GI following -resolved  - no longer in septic shock. WBC wnl this AM.    -2/2 acute calculus cholecystitis with biliary dilatation with common bile duct stone.   -Blood Cx + for ESBL. E.coli, repeat BC negative   -completed course of iv antibx of ertapenem  -cholecystostomy tube removed on 11/12   -repeat CT on 11/12: suggestion of choledocholithiasis with a 5 mm stone suggested in the CBD  -Patient is for ERCP today   -NPO until after procedure   -Surgery following, apprec recs for timing of cholecystectomy post ERCP   -GI following, apprec recs post ERCP

## 2018-11-14 NOTE — PROGRESS NOTE ADULT - ASSESSMENT
·	ESRD on HD  ·	Cholecystitis, s/p Perc cholecystostomy  ·	Diabetes  ·	Hypertension    For ERCP today. To continue HD TIW as scheduled. Fluid removal as tolerated by BP.   Dietary and PO fluid restriction. Epogen as needed for anemia.  Monitor BP trend. Titrate BP meds as needed. Salt restriction. Surgery follow up.   Monitor blood sugar levels. Insulin coverage as needed.

## 2018-11-15 LAB
ANION GAP SERPL CALC-SCNC: 8 MMOL/L — SIGNIFICANT CHANGE UP (ref 5–17)
ANION GAP SERPL CALC-SCNC: 9 MMOL/L — SIGNIFICANT CHANGE UP (ref 5–17)
BASOPHILS # BLD AUTO: 0.03 K/UL — SIGNIFICANT CHANGE UP (ref 0–0.2)
BASOPHILS NFR BLD AUTO: 0.3 % — SIGNIFICANT CHANGE UP (ref 0–2)
BUN SERPL-MCNC: 18 MG/DL — SIGNIFICANT CHANGE UP (ref 7–23)
BUN SERPL-MCNC: 49 MG/DL — HIGH (ref 7–23)
CALCIUM SERPL-MCNC: 8.2 MG/DL — LOW (ref 8.5–10.1)
CALCIUM SERPL-MCNC: 8.5 MG/DL — SIGNIFICANT CHANGE UP (ref 8.5–10.1)
CHLORIDE SERPL-SCNC: 95 MMOL/L — LOW (ref 96–108)
CHLORIDE SERPL-SCNC: 96 MMOL/L — SIGNIFICANT CHANGE UP (ref 96–108)
CO2 SERPL-SCNC: 27 MMOL/L — SIGNIFICANT CHANGE UP (ref 22–31)
CO2 SERPL-SCNC: 31 MMOL/L — SIGNIFICANT CHANGE UP (ref 22–31)
CREAT SERPL-MCNC: 3.7 MG/DL — HIGH (ref 0.5–1.3)
CREAT SERPL-MCNC: 7.2 MG/DL — HIGH (ref 0.5–1.3)
EOSINOPHIL # BLD AUTO: 0.17 K/UL — SIGNIFICANT CHANGE UP (ref 0–0.5)
EOSINOPHIL NFR BLD AUTO: 1.5 % — SIGNIFICANT CHANGE UP (ref 0–6)
GLUCOSE SERPL-MCNC: 161 MG/DL — HIGH (ref 70–99)
GLUCOSE SERPL-MCNC: 208 MG/DL — HIGH (ref 70–99)
HCT VFR BLD CALC: 27.7 % — LOW (ref 34.5–45)
HGB BLD-MCNC: 8.6 G/DL — LOW (ref 11.5–15.5)
IMM GRANULOCYTES NFR BLD AUTO: 0.4 % — SIGNIFICANT CHANGE UP (ref 0–1.5)
LYMPHOCYTES # BLD AUTO: 18.8 % — SIGNIFICANT CHANGE UP (ref 13–44)
LYMPHOCYTES # BLD AUTO: 2.11 K/UL — SIGNIFICANT CHANGE UP (ref 1–3.3)
MCHC RBC-ENTMCNC: 27.8 PG — SIGNIFICANT CHANGE UP (ref 27–34)
MCHC RBC-ENTMCNC: 31 GM/DL — LOW (ref 32–36)
MCV RBC AUTO: 89.6 FL — SIGNIFICANT CHANGE UP (ref 80–100)
MONOCYTES # BLD AUTO: 0.65 K/UL — SIGNIFICANT CHANGE UP (ref 0–0.9)
MONOCYTES NFR BLD AUTO: 5.8 % — SIGNIFICANT CHANGE UP (ref 2–14)
NEUTROPHILS # BLD AUTO: 8.19 K/UL — HIGH (ref 1.8–7.4)
NEUTROPHILS NFR BLD AUTO: 73.2 % — SIGNIFICANT CHANGE UP (ref 43–77)
PLATELET # BLD AUTO: 383 K/UL — SIGNIFICANT CHANGE UP (ref 150–400)
POTASSIUM SERPL-MCNC: 4.2 MMOL/L — SIGNIFICANT CHANGE UP (ref 3.5–5.3)
POTASSIUM SERPL-MCNC: 5.2 MMOL/L — SIGNIFICANT CHANGE UP (ref 3.5–5.3)
POTASSIUM SERPL-SCNC: 4.2 MMOL/L — SIGNIFICANT CHANGE UP (ref 3.5–5.3)
POTASSIUM SERPL-SCNC: 5.2 MMOL/L — SIGNIFICANT CHANGE UP (ref 3.5–5.3)
RBC # BLD: 3.09 M/UL — LOW (ref 3.8–5.2)
RBC # FLD: 17.1 % — HIGH (ref 10.3–14.5)
SODIUM SERPL-SCNC: 132 MMOL/L — LOW (ref 135–145)
SODIUM SERPL-SCNC: 134 MMOL/L — LOW (ref 135–145)
WBC # BLD: 11.2 K/UL — HIGH (ref 3.8–10.5)
WBC # FLD AUTO: 11.2 K/UL — HIGH (ref 3.8–10.5)

## 2018-11-15 PROCEDURE — 99232 SBSQ HOSP IP/OBS MODERATE 35: CPT

## 2018-11-15 PROCEDURE — 99233 SBSQ HOSP IP/OBS HIGH 50: CPT | Mod: GC

## 2018-11-15 PROCEDURE — 74018 RADEX ABDOMEN 1 VIEW: CPT | Mod: 26

## 2018-11-15 PROCEDURE — 71045 X-RAY EXAM CHEST 1 VIEW: CPT | Mod: 26

## 2018-11-15 RX ORDER — ONDANSETRON 8 MG/1
4 TABLET, FILM COATED ORAL ONCE
Qty: 0 | Refills: 0 | Status: COMPLETED | OUTPATIENT
Start: 2018-11-15 | End: 2018-11-15

## 2018-11-15 RX ORDER — HEPARIN SODIUM 5000 [USP'U]/ML
5000 INJECTION INTRAVENOUS; SUBCUTANEOUS EVERY 8 HOURS
Qty: 0 | Refills: 0 | Status: DISCONTINUED | OUTPATIENT
Start: 2018-11-15 | End: 2018-11-16

## 2018-11-15 RX ORDER — SENNA PLUS 8.6 MG/1
2 TABLET ORAL AT BEDTIME
Qty: 0 | Refills: 0 | Status: DISCONTINUED | OUTPATIENT
Start: 2018-11-15 | End: 2018-11-16

## 2018-11-15 RX ORDER — FUROSEMIDE 40 MG
20 TABLET ORAL ONCE
Qty: 0 | Refills: 0 | Status: COMPLETED | OUTPATIENT
Start: 2018-11-15 | End: 2018-11-15

## 2018-11-15 RX ADMIN — ISOSORBIDE MONONITRATE 30 MILLIGRAM(S): 60 TABLET, EXTENDED RELEASE ORAL at 17:21

## 2018-11-15 RX ADMIN — Medication 4: at 17:13

## 2018-11-15 RX ADMIN — ISOSORBIDE MONONITRATE 60 MILLIGRAM(S): 60 TABLET, EXTENDED RELEASE ORAL at 05:36

## 2018-11-15 RX ADMIN — Medication 81 MILLIGRAM(S): at 14:20

## 2018-11-15 RX ADMIN — PANTOPRAZOLE SODIUM 40 MILLIGRAM(S): 20 TABLET, DELAYED RELEASE ORAL at 05:37

## 2018-11-15 RX ADMIN — Medication 20 MILLIGRAM(S): at 22:40

## 2018-11-15 RX ADMIN — Medication 100 MILLIGRAM(S): at 14:20

## 2018-11-15 RX ADMIN — CHLORHEXIDINE GLUCONATE 1 APPLICATION(S): 213 SOLUTION TOPICAL at 14:21

## 2018-11-15 RX ADMIN — Medication 50 MILLIGRAM(S): at 17:20

## 2018-11-15 RX ADMIN — Medication 325 MILLIGRAM(S): at 14:20

## 2018-11-15 RX ADMIN — SENNA PLUS 2 TABLET(S): 8.6 TABLET ORAL at 21:51

## 2018-11-15 RX ADMIN — Medication 90 MILLIGRAM(S): at 05:36

## 2018-11-15 RX ADMIN — ERYTHROPOIETIN 10000 UNIT(S): 10000 INJECTION, SOLUTION INTRAVENOUS; SUBCUTANEOUS at 10:54

## 2018-11-15 RX ADMIN — Medication 325 MILLIGRAM(S): at 21:49

## 2018-11-15 RX ADMIN — ONDANSETRON 4 MILLIGRAM(S): 8 TABLET, FILM COATED ORAL at 21:48

## 2018-11-15 RX ADMIN — Medication 325 MILLIGRAM(S): at 05:37

## 2018-11-15 RX ADMIN — Medication 100 MILLIGRAM(S): at 05:36

## 2018-11-15 RX ADMIN — Medication 4: at 21:49

## 2018-11-15 RX ADMIN — Medication 2: at 08:22

## 2018-11-15 RX ADMIN — Medication 100 MILLIGRAM(S): at 21:48

## 2018-11-15 RX ADMIN — URSODIOL 300 MILLIGRAM(S): 250 TABLET, FILM COATED ORAL at 17:20

## 2018-11-15 RX ADMIN — POLYETHYLENE GLYCOL 3350 17 GRAM(S): 17 POWDER, FOR SOLUTION ORAL at 14:19

## 2018-11-15 RX ADMIN — CLOPIDOGREL BISULFATE 75 MILLIGRAM(S): 75 TABLET, FILM COATED ORAL at 14:20

## 2018-11-15 RX ADMIN — GABAPENTIN 300 MILLIGRAM(S): 400 CAPSULE ORAL at 14:20

## 2018-11-15 RX ADMIN — ATORVASTATIN CALCIUM 40 MILLIGRAM(S): 80 TABLET, FILM COATED ORAL at 21:48

## 2018-11-15 RX ADMIN — SIMETHICONE 80 MILLIGRAM(S): 80 TABLET, CHEWABLE ORAL at 21:48

## 2018-11-15 RX ADMIN — URSODIOL 300 MILLIGRAM(S): 250 TABLET, FILM COATED ORAL at 05:36

## 2018-11-15 RX ADMIN — Medication 1 TABLET(S): at 14:20

## 2018-11-15 NOTE — PROGRESS NOTE ADULT - SUBJECTIVE AND OBJECTIVE BOX
Subjective: GI note appreciated, pt without new complaints.     Objective:  Vital Signs Last 24 Hrs  T(C): 36.4 (15 Nov 2018 05:10), Max: 36.9 (14 Nov 2018 13:25)  T(F): 97.6 (15 Nov 2018 05:10), Max: 98.4 (14 Nov 2018 13:25)  HR: 54 (15 Nov 2018 05:10) (54 - 73)  BP: 155/79 (15 Nov 2018 05:10) (133/66 - 176/74)  BP(mean): --  RR: 20 (15 Nov 2018 05:10) (16 - 20)  SpO2: 99% (15 Nov 2018 05:10) (94% - 99%)    Heent: QUENTIN, PADDYOM  Pul: clear  Cor: RSR, without murmurs  Abdomen: normal bowel sounds, without distension or tenderness  Extremities: without edema, motor/sensory intact, without calf pain, Homans sign negative.                        8.6    11.20 )-----------( 383      ( 15 Nov 2018 08:30 )             27.7       11-15    132<L>  |  96  |  49<H>  ----------------------------<  161<H>  5.2   |  27  |  7.20<H>    Ca    8.2<L>      15 Nov 2018 08:30  Phos  4.7     11-14    TPro  6.9  /  Alb  1.9<L>  /  TBili  0.5  /  DBili  .30<H>  /  AST  17  /  ALT  15  /  AlkPhos  232<H>  11-14 11-14 @ 07:01  -  11-15 @ 07:00  --------------------------------------------------------  IN:    Oral Fluid: 120 mL    sodium chloride 0.9%: 50 mL  Total IN: 170 mL    OUT:  Total OUT: 0 mL    Total NET: 170 mL

## 2018-11-15 NOTE — PROGRESS NOTE ADULT - PROBLEM SELECTOR PLAN 1
-resolved  - no longer in septic shock. WBC mildly elevated this AM.   -2/2 acute calculus cholecystitis with biliary dilatation with common bile duct stone.   -Blood Cx + for ESBL. E.coli, repeat BC negative   -completed course of iv antibx of ertapenem  -cholecystostomy tube removed on 11/12   -repeat CT on 11/12: suggestion of choledocholithiasis with a 5 mm stone suggested in the CBD  -S/P ERCP, stone unable to be removed.    -Surgery following  -GI following, as per GI Liver Enzymes currently stable, patient is stable for discharge to [facility] with close monitoring of Liver Enzymes, and possible PTC and ERCP in future  -D/C planning to [facility]

## 2018-11-15 NOTE — PROGRESS NOTE ADULT - SUBJECTIVE AND OBJECTIVE BOX
DEPARTMENT OF ANESTHESIA  POST ANESTHETIC EVALUATION    The Patient was interviewed and evaluated.  The patient is S/P an ERCP     Vital Signs Last 24 Hrs  T(C): 33.7 (15 Nov 2018 09:40), Max: 36.9 (14 Nov 2018 13:25)  T(F): 92.6 (15 Nov 2018 09:40), Max: 98.4 (14 Nov 2018 13:25)  HR: 61 (15 Nov 2018 09:40) (54 - 73)  BP: 154/71 (15 Nov 2018 09:40) (133/66 - 176/74)  BP(mean): --  RR: 18 (15 Nov 2018 09:40) (16 - 20)  SpO2: 96% (15 Nov 2018 09:40) (94% - 99%)    Evaluation:  The patient is doing  well    (x ) No apparent complications or complaints regarding anesthesia care at this time  x( ) All questions were answered    Condition:  (x ) Stable      ( ) Guarded      ( ) Critical    Recommendations:  ( ) None     ( ) Other:

## 2018-11-15 NOTE — CHART NOTE - NSCHARTNOTEFT_GEN_A_CORE
Called by RN for nausea, abdominal pain.  Pt seen and examined at bedside. Pt complained of abdominal distention "bloating feeling" and pain localized to midline of abdomen around the umbilicus. Also reports SOB and cough. Cough started after the .  relieved by NC 3L placed by nursing. Denies CP, palpitations, vomiting, paresthesias.  Per Daughter, pt was retaining urine earlier today and was straight catheterized relieving 400cc urine. Pt has not had a BM in 2 days and has very little appetite, eating only tea and some roti today.       _Charting in progress--     Vitals  Vital Signs Last 24 Hrs  T(C): 36.7 (15 Nov 2018 19:18), Max: 36.7 (14 Nov 2018 19:46)  T(F): 98.1 (15 Nov 2018 19:18), Max: 98.1 (15 Nov 2018 19:18)  HR: 74 (15 Nov 2018 19:18) (54 - 78)  BP: 176/72 (15 Nov 2018 19:18) (133/66 - 176/72)  BP(mean): --  RR: 18 (15 Nov 2018 19:18) (16 - 20)  SpO2: 100% (15 Nov 2018 19:18) (96% - 100%)      General: WN/WD NAD  Respiratory: CTA B/L, no wheezes, rales, rhonchi  CV: RRR, S1S2, no murmurs, rubs or gallops  Abdominal: Soft, distended, TTP lower quadrants, poorly localized.  ND +BS  Extremities: No edema, warm, well perfused,  + peripheral pulses  Neurology: A&Ox3, nonfocal        A/P:    Bladder Scan + Straight cath -   STAT CXR  STAT Abdominal XR Called by RN for nausea, abdominal pain.  Pt seen and examined at bedside. Pt complained of abdominal distention "bloating feeling" and pain localized to midline of abdomen around the umbilicus. Also reports SOB and cough. Cough started after the .  relieved by NC 3L placed by nursing. Denies CP, palpitations, vomiting, paresthesias.  Per Daughter, pt was retaining urine earlier today and was straight catheterized relieving 400cc urine. Pt has not had a BM in 2 days and has very little appetite, eating only tea and some roti today.       _Charting in progress--     Vitals  Vital Signs Last 24 Hrs  T(C): 36.7 (15 Nov 2018 19:18), Max: 36.7 (14 Nov 2018 19:46)  T(F): 98.1 (15 Nov 2018 19:18), Max: 98.1 (15 Nov 2018 19:18)  HR: 74 (15 Nov 2018 19:18) (54 - 78)  BP: 176/72 (15 Nov 2018 19:18) (133/66 - 176/72)  BP(mean): --  RR: 18 (15 Nov 2018 19:18) (16 - 20)  SpO2: 100% (15 Nov 2018 19:18) (96% - 100%)      General: obese female NAD  Respiratory: CTA B/L, no wheezes, rales, rhonchi  CV: RRR, S1S2, no murmurs, rubs or gallops  Abdominal: Soft, distended, TTP lower quadrants, poorly localized.  hypoactive BS  Extremities: No edema, warm, well perfused,  +1 peripheral pulses  Neurology: A&Ox3, nonfocal        A/P:   67 yo F,  annamaria speaking, with PMH of CAD s/p stent (2007), ESRD on HD (MWF; missed today's session), DM type 2, and HTN presents with fever and admitted with severe sepsis secondary to acute choledocholithiasis, s/p perc cholecystostomy tube. Repeat CT suggestion of choledocholithiasis with a 5 mm stone suggested in the CBD, S/p ERCP on 11/14, stone unable to be removed.  D/C planning to [facility] with close follow up by GI        Cough, SOB  Abdominal Bloating     STAT CXR  STAT Abdominal XR Called by RN for nausea, abdominal pain.  Pt seen and examined at bedside. Pt complained of abdominal distention "bloating feeling" for the last hour which transitioned to 7/10 constant pain localized to midline of abdomen around the umbilicus. Also reports SOB and cough. Cough started after her procedure yesterday. The cough is dry. Her SOB and nausea were relieved by NC 3L placed by nursing prior to exam. Pt removed NC and did not feel short of breath during conversation. Pt Denies CP, palpitations, vomiting, paresthesias.  Per Daughter, pt was retaining urine earlier today and was straight catheterized relieving 400cc urine. Pt has not had a BM in 2 days and has very little appetite, eating only tea and some roti today.       Vitals  Vital Signs Last 24 Hrs  T(C): 36.7 (15 Nov 2018 19:18), Max: 36.7 (14 Nov 2018 19:46)  T(F): 98.1 (15 Nov 2018 19:18), Max: 98.1 (15 Nov 2018 19:18)  HR: 74 (15 Nov 2018 19:18) (54 - 78)  BP: 176/72 (15 Nov 2018 19:18) (133/66 - 176/72)  BP(mean): --  RR: 18 (15 Nov 2018 19:18) (16 - 20)  SpO2: 100% (15 Nov 2018 19:18) (96% - 100%)      General: obese female NAD  Respiratory: CTA B/L, no wheezes, rales, rhonchi  CV: RRR, S1S2, no murmurs, rubs or gallops  Abdominal: Soft, distended, TTP lower quadrants, poorly localized.  hypoactive BS  Extremities: No edema, warm, well perfused,  +1 peripheral pulses  Neurology: A&Ox3, nonfocal      A/P:   69 yo F,  annamaria speaking, with PMH of CAD s/p stent (2007), ESRD on HD (MWF; missed today's session), DM type 2, and HTN presents with fever and admitted with severe sepsis secondary to acute choledocholithiasis, s/p perc cholecystostomy tube. Repeat CT suggestion of choledocholithiasis with a 5 mm stone suggested in the CBD, S/p ERCP on 11/14, stone unable to be removed.  D/C planning to [facility] with close follow up by GI        Cough, SOB + Abdominal Bloating   STAT CXR  STAT Abdominal XR Called by RN for nausea, abdominal pain.  Pt seen and examined at bedside. Pt complained of abdominal distention "bloating feeling" for the last hour which transitioned to 7/10 constant pain localized to midline of abdomen around the umbilicus. Also reports SOB and cough. Cough started after her procedure yesterday. The cough is dry. Her SOB and nausea were relieved by NC 3L placed by nursing prior to exam. Pt removed NC and did not feel short of breath during conversation. Pt Denies CP, palpitations, vomiting, paresthesias.  Per Daughter, pt was retaining urine earlier today and was straight catheterized relieving 400cc urine. Pt has not had a BM in 2 days and has very little appetite, eating only tea and some roti today.       Vitals  Vital Signs Last 24 Hrs  T(C): 36.7 (15 Nov 2018 19:18), Max: 36.7 (14 Nov 2018 19:46)  T(F): 98.1 (15 Nov 2018 19:18), Max: 98.1 (15 Nov 2018 19:18)  HR: 74 (15 Nov 2018 19:18) (54 - 78)  BP: 176/72 (15 Nov 2018 19:18) (133/66 - 176/72)  BP(mean): --  RR: 18 (15 Nov 2018 19:18) (16 - 20)  SpO2: 100% (15 Nov 2018 19:18) (96% - 100%)      General: obese female NAD  Respiratory: CTA B/L, no wheezes, rales, rhonchi  CV: RRR, S1S2, no murmurs, rubs or gallops  Abdominal: Soft, distended, TTP lower quadrants, poorly localized.  hypoactive BS  Extremities: No edema, warm, well perfused,  +1 peripheral pulses  Neurology: A&Ox3, nonfocal      A/P:   69 yo F,  annamaria speaking, with PMH of CAD s/p stent (2007), ESRD on HD (MWF; missed today's session), DM type 2, and HTN presents with fever and admitted with severe sepsis secondary to acute choledocholithiasis, s/p perc cholecystostomy tube. Repeat CT suggestion of choledocholithiasis with a 5 mm stone suggested in the CBD, S/p ERCP on 11/14, stone unable to be removed.  D/C planning to [facility] with close follow up by GI        Cough, SOB + Abdominal Bloating with nausea  -STAT CXR showed pulmonary vascular congestion  -STAT Abdominal XR showed air distended stomach and stool throughout the colon.  -Ordered STAT Lasix 20 mg IV   -Ordered STAT Zofran 4 mg IV x 1 for nausea  -ordered Senna for constipation x 1   -Gave simethecone x 1 for gas distension.  - RN to repeat vitals in 1 hour.  - Will continue to follow. Called by RN for nausea, abdominal pain.  Pt seen and examined at bedside. Pt complained of abdominal distention "bloating feeling" for the last hour which transitioned to 7/10 constant pain localized to midline of abdomen around the umbilicus. Also reports SOB and cough. Pt seen and examined at bedside.  Cough started after her procedure yesterday. The cough is dry. Her SOB and nausea were relieved by NC 3L placed by nursing prior to exam. Pt removed NC and did not feel short of breath during conversation. Pt Denies CP, palpitations, vomiting, paresthesias.  Per Daughter, pt was retaining urine earlier today and was straight catheterized relieving 400cc urine. Pt has not had a BM in 2 days and has very little appetite, eating only tea and some roti today.       Vitals  Vital Signs Last 24 Hrs  T(C): 36.7 (15 Nov 2018 19:18), Max: 36.7 (14 Nov 2018 19:46)  T(F): 98.1 (15 Nov 2018 19:18), Max: 98.1 (15 Nov 2018 19:18)  HR: 74 (15 Nov 2018 19:18) (54 - 78)  BP: 176/72 (15 Nov 2018 19:18) (133/66 - 176/72)  BP(mean): --  RR: 18 (15 Nov 2018 19:18) (16 - 20)  SpO2: 100% (15 Nov 2018 19:18) (96% - 100%)      General: obese female NAD  Respiratory: CTA B/L, no wheezes, rales, rhonchi  CV: RRR, S1S2, no murmurs, rubs or gallops  Abdominal: Soft, distended, TTP lower quadrants, poorly localized.  hypoactive BS  Extremities: No edema, warm, well perfused,  +1 peripheral pulses  Neurology: A&Ox3, nonfocal      A/P:   67 yo F,  annamaria speaking, with PMH of CAD s/p stent (2007), ESRD on HD (MWF; missed today's session), DM type 2, and HTN presents with fever and admitted with severe sepsis secondary to acute choledocholithiasis, s/p perc cholecystostomy tube. Repeat CT suggestion of choledocholithiasis with a 5 mm stone suggested in the CBD, S/p ERCP on 11/14, stone unable to be removed.  D/C planning to [facility] with close follow up by GI        Cough, SOB + Abdominal Bloating with nausea  -STAT CXR showed pulmonary vascular congestion  -STAT Abdominal XR showed air distended stomach and stool throughout the colon.  -Ordered STAT Lasix 20 mg IV   -Ordered STAT Zofran 4 mg IV x 1 for nausea  -ordered Senna for constipation x 1   -Gave simethecone x 1 for gas distension.  - RN to repeat vitals in 1 hour and call if abnormal or status change  - Will continue to follow.      ADDENDUM  11/16/18 06;00    Called by RN for vomiting.  Pt seen and examined at bedside.  #[id number]. Pt complains of nausea and abdominal fullness but without pain or SOB. She vomited some light brown liquid this morning. She states she did not have a bowel movement last night.  Denies CP, SOB, palpitations, HA, dizziness, numbness or tingling.    Vital Signs Last 24 Hrs  T(C): 36.9 (11-16-18 @ 05:52), Max: 37.7 (11-16-18 @ 00:47)  HR: 85 (11-16-18 @ 05:52) (61 - 89)  BP: 149/77 (11-16-18 @ 05:52) (130/68 - 176/74)  RR: 18 (11-16-18 @ 05:52) (18 - 18)  SpO2: 92% (11-16-18 @ 05:52) (92% - 100%)    GENERAL: patient appears well, no acute distress, appropriate, pleasant  ENMT:  moist mucous membranes  LUNGS: good air entry bilaterally, clear to auscultation, symmetric breath sounds, no wheezing or rhonchi appreciated  HEART: soft S1/S2, regular rate and rhythm, no murmurs noted, no lower extremity edema  GASTROINTESTINAL: abdomen is soft, nontender, distended, normoactive bowel sounds  INTEGUMENT: good skin turgor, no lesions noted  NEUROLOGIC: awake, alert, oriented x3,     P  - Bladder scan. Straight cath relieved  -Gave simethicone x 1 for gas distension.

## 2018-11-15 NOTE — PROGRESS NOTE ADULT - PROBLEM SELECTOR PLAN 6
type 2 stable  -c/w lantus and low dose insulin sliding scale with hypoglycemic protocol  - accuchecks  -HgA1c 9.7

## 2018-11-15 NOTE — PROGRESS NOTE ADULT - SUBJECTIVE AND OBJECTIVE BOX
INTERVAL HPI/OVERNIGHT EVENTS:  pt seen and examined  denies abd pain, c/o weakness  per overnight rn no c/o abd pain, no vomiting, passed small green bm  afebrile overnight cbc/bmp noted  sp ercp yesterday    MEDICATIONS  (STANDING):  aspirin enteric coated 81 milliGRAM(s) Oral daily  atorvastatin 40 milliGRAM(s) Oral at bedtime  calcium carbonate 1250 mG  + Vitamin D (OsCal 500 + D) 1 Tablet(s) Oral daily  chlorhexidine 2% Cloths 1 Application(s) Topical daily  clopidogrel Tablet 75 milliGRAM(s) Oral daily  dextrose 5%. 1000 milliLiter(s) (50 mL/Hr) IV Continuous <Continuous>  dextrose 50% Injectable 12.5 Gram(s) IV Push once  dextrose 50% Injectable 25 Gram(s) IV Push once  dextrose 50% Injectable 25 Gram(s) IV Push once  docusate sodium 100 milliGRAM(s) Oral three times a day  epoetin analilia Injectable 66996 Unit(s) IV Push <User Schedule>  ferrous    sulfate 325 milliGRAM(s) Oral three times a day  gabapentin 300 milliGRAM(s) Oral daily  insulin lispro (HumaLOG) corrective regimen sliding scale   SubCutaneous Before meals and at bedtime  isosorbide   mononitrate ER Tablet (IMDUR) 60 milliGRAM(s) Oral <User Schedule>  isosorbide   mononitrate ER Tablet (IMDUR) 30 milliGRAM(s) Oral every 24 hours  metoprolol tartrate 50 milliGRAM(s) Oral two times a day  NIFEdipine XL 90 milliGRAM(s) Oral daily  ondansetron Injectable 4 milliGRAM(s) IV Push once  pantoprazole    Tablet 40 milliGRAM(s) Oral before breakfast  polyethylene glycol 3350 17 Gram(s) Oral daily  ursodiol Capsule 300 milliGRAM(s) Oral every 12 hours    MEDICATIONS  (PRN):  acetaminophen   Tablet .. 650 milliGRAM(s) Oral every 6 hours PRN Mild Pain (1 - 3)  dextrose 40% Gel 15 Gram(s) Oral once PRN Blood Glucose LESS THAN 70 milliGRAM(s)/deciliter  glucagon  Injectable 1 milliGRAM(s) IntraMuscular once PRN Glucose LESS THAN 70 milligrams/deciliter  simethicone 80 milliGRAM(s) Chew every 6 hours PRN abdominal bloating      Allergies    No Known Drug Allergies  Purell (Rash)    Intolerances        Review of Systems:    General:  weak  Eyes:  Good vision, no reported pain  ENT:  No sore throat, pain, runny nose, dysphagia  CV:  No pain, palpitations, hypo/hypertension  Resp:  No dyspnea, cough, tachypnea, wheezing  GI:  No pain, No nausea, No vomiting, No diarrhea, No constipation, No weight loss, No fever, No pruritis, No rectal bleeding, No melena, No dysphagia  :  No pain, bleeding, incontinence, nocturia  Muscle:  No pain, weakness  Neuro:  No weakness, tingling, memory problems  Psych:  No fatigue, insomnia, mood problems, depression  Endocrine:  No polyuria, polydypsia, cold/heat intolerance  Heme:  No petechiae, ecchymosis, easy bruisability  Skin:  No rash, tattoos, scars, edema      Vital Signs Last 24 Hrs  T(C): 36.4 (15 Nov 2018 05:10), Max: 36.9 (14 Nov 2018 13:25)  T(F): 97.6 (15 Nov 2018 05:10), Max: 98.4 (14 Nov 2018 13:25)  HR: 54 (15 Nov 2018 05:10) (54 - 73)  BP: 155/79 (15 Nov 2018 05:10) (133/66 - 176/74)  BP(mean): --  RR: 20 (15 Nov 2018 05:10) (16 - 20)  SpO2: 99% (15 Nov 2018 05:10) (94% - 99%)    PHYSICAL EXAM:    Constitutional: NAD, lying in bed  HEENT: ncat  Neck: No LAD  Respiratory: dec bs  Cardiovascular: S1 and S2, RRR  Gastrointestinal: soft nt mild dt   Extremities: no edema  Vascular: 2+ peripheral pulses  Neurological: awake alert   Skin: No rashes      LABS:                        8.6    11.20 )-----------( 383      ( 15 Nov 2018 08:30 )             27.7     11-15    132<L>  |  96  |  49<H>  ----------------------------<  161<H>  5.2   |  27  |  7.20<H>    Ca    8.2<L>      15 Nov 2018 08:30  Phos  4.7     11-14    TPro  6.9  /  Alb  1.9<L>  /  TBili  0.5  /  DBili  .30<H>  /  AST  17  /  ALT  15  /  AlkPhos  232<H>  11-14    PT/INR - ( 14 Nov 2018 08:27 )   PT: 14.1 sec;   INR: 1.23 ratio               RADIOLOGY & ADDITIONAL TESTS:

## 2018-11-15 NOTE — PROGRESS NOTE ADULT - ASSESSMENT
68 annamaria speaking obese F with PMH of CAD s/p stent (2007), ESRD on HD (MWF; missed today's session), DM type 2, and HTN presents with fever and admitted with severe sepsis secondary to acute choledocholithiasis, s/p perc cholecystostomy tube. Repeat CT suggestion of choledocholithiasis with a 5 mm stone suggested in the CBD, S/p ERCP on 11/14, stone unable to be removed.  D/C planning to [facility]. 68 annamaria speaking obese F with PMH of CAD s/p stent (2007), ESRD on HD (MWF; missed today's session), DM type 2, and HTN presents with fever and admitted with severe sepsis secondary to acute choledocholithiasis, s/p perc cholecystostomy tube. Repeat CT suggestion of choledocholithiasis with a 5 mm stone suggested in the CBD, S/p ERCP on 11/14, stone unable to be removed.  D/C planning to [facility] with close follow up by GI  for ongoing care and management

## 2018-11-15 NOTE — PROVIDER CONTACT NOTE (OTHER) - ACTION/TREATMENT ORDERED:
Dr. Strickland made aware, orders given for bladder scan, noted to have 408mL, orders given to straight cath, Output of 400mL and to continue to monitor patient.

## 2018-11-15 NOTE — PROGRESS NOTE ADULT - ASSESSMENT
Patient is now status post cholecystotomy tube for biliary sepsis on 10/30.  She had rapid atrial fibrillation on 10/30 and converted to sinus rhythm. She required pressor therapy which has now been discontinued. Her white blood count improved today remains hemodynamically stable. She has chronic renal failure on hemodialysis. Her elevated troponin level may represent a component of subendocardial ischemia without true atherothrombosis. These results are nonspecific in the setting of her kidney disease. Currently afebrile and arousable with no evidence of decompensated heart failure or active ischemic.     Recommend:  - s/p fall with no overt injuries.  Fall precautions.  - cont dual antiplatelet therapy for now for CAD s/p stents  - cont statin   - cont metoprolol, did not receive this morning dose  -BP elevated today possibly d/t missed BB  - cont isosorbide mononitrate  - HD per Renal.   - Continue nifedipine 90 mg for HTN as tolerated  - no cardiac contraindication to proceed with ERCP today. Routine monitoring is recommended.  - Will continue to follow while inpatient  [facility] [place]  Cardiology Patient is now status post cholecystotomy tube for biliary sepsis on 10/30.  She had rapid atrial fibrillation on 10/30 and converted to sinus rhythm. She required pressor therapy which has now been discontinued. Her white blood count improved today remains hemodynamically stable. She has chronic renal failure on hemodialysis. Her elevated troponin level may represent a component of subendocardial ischemia without true atherothrombosis. These results are nonspecific in the setting of her kidney disease. Currently afebrile and arousable with no evidence of decompensated heart failure or active ischemic.   s/p unsuccessful ERCP yesterday    Recommend:  - s/p fall with no overt injuries.  Fall precautions.  - cont dual antiplatelet therapy for now for CAD s/p stents  - DVT prophylaxis  - cont statin   - cont metoprolol, did not receive this morning dose due to HR below 60  -BP elevated today possibly d/t missed BB  - cont isosorbide mononitrate  - HD per Renal.   - Continue nifedipine 90 mg for HTN as tolerated  - Will continue to follow while inpatient  - GI F/U  [facility]  Cardiology

## 2018-11-15 NOTE — PROGRESS NOTE ADULT - SUBJECTIVE AND OBJECTIVE BOX
Patient is a 68y old  Female who presents with a chief complaint of Sepsis (31 Oct 2018 07:06)  Patient seen in follow up for ESRD on HD. s/p Perc Cholecystostomy. + blood cultures ESBL     No new complaints. HD today    PAST MEDICAL HISTORY:  ESRD (end stage renal disease) on dialysis  CAD (coronary artery disease)  Diabetes  CRF (chronic renal failure)  Hypertension  Pneumonia  Myocardial infarction  Hypertension  Diabetes    MEDICATIONS  (STANDING):  aspirin enteric coated 81 milliGRAM(s) Oral daily  atorvastatin 40 milliGRAM(s) Oral at bedtime  calcium carbonate 1250 mG  + Vitamin D (OsCal 500 + D) 1 Tablet(s) Oral daily  chlorhexidine 2% Cloths 1 Application(s) Topical daily  clopidogrel Tablet 75 milliGRAM(s) Oral daily  dextrose 5%. 1000 milliLiter(s) (50 mL/Hr) IV Continuous <Continuous>  dextrose 50% Injectable 12.5 Gram(s) IV Push once  dextrose 50% Injectable 25 Gram(s) IV Push once  dextrose 50% Injectable 25 Gram(s) IV Push once  docusate sodium 100 milliGRAM(s) Oral three times a day  epoetin analilia Injectable 51177 Unit(s) IV Push <User Schedule>  ferrous    sulfate 325 milliGRAM(s) Oral three times a day  gabapentin 300 milliGRAM(s) Oral daily  heparin  Injectable 5000 Unit(s) SubCutaneous every 8 hours  insulin lispro (HumaLOG) corrective regimen sliding scale   SubCutaneous Before meals and at bedtime  isosorbide   mononitrate ER Tablet (IMDUR) 60 milliGRAM(s) Oral <User Schedule>  isosorbide   mononitrate ER Tablet (IMDUR) 30 milliGRAM(s) Oral every 24 hours  metoprolol tartrate 50 milliGRAM(s) Oral two times a day  NIFEdipine XL 90 milliGRAM(s) Oral daily  ondansetron Injectable 4 milliGRAM(s) IV Push once  pantoprazole    Tablet 40 milliGRAM(s) Oral before breakfast  polyethylene glycol 3350 17 Gram(s) Oral daily  ursodiol Capsule 300 milliGRAM(s) Oral every 12 hours    MEDICATIONS  (PRN):  acetaminophen   Tablet .. 650 milliGRAM(s) Oral every 6 hours PRN Mild Pain (1 - 3)  dextrose 40% Gel 15 Gram(s) Oral once PRN Blood Glucose LESS THAN 70 milliGRAM(s)/deciliter  glucagon  Injectable 1 milliGRAM(s) IntraMuscular once PRN Glucose LESS THAN 70 milligrams/deciliter  simethicone 80 milliGRAM(s) Chew every 6 hours PRN abdominal bloating    T(C): 36.6 (11-15-18 @ 13:46), Max: 36.9 (11-14-18 @ 13:25)  HR: 66 (11-15-18 @ 13:46) (54 - 73)  BP: 160/72 (11-15-18 @ 13:46) (133/66 - 176/74)  RR: 18 (11-15-18 @ 13:46)  SpO2: 96% (11-15-18 @ 13:46)  Wt(kg): --  I&O's Detail    14 Nov 2018 07:01  -  15 Nov 2018 07:00  --------------------------------------------------------  IN:    Oral Fluid: 120 mL    sodium chloride 0.9%: 50 mL  Total IN: 170 mL    OUT:  Total OUT: 0 mL    Total NET: 170 mL      15 Nov 2018 07:01  -  15 Nov 2018 16:18  --------------------------------------------------------  IN:  Total IN: 0 mL    OUT:    Other: 2000 mL  Total OUT: 2000 mL    Total NET: -2000 mL          PHYSICAL EXAM:  General: NAD, Rt chest dialysis catheter  Respiratory: b/l air entry  Cardiovascular: S1 S2  Gastrointestinal: soft, cholecystostomy tube  Extremities:  no edema       LABORATORY:                        8.6    11.20 )-----------( 383      ( 15 Nov 2018 08:30 )             27.7     11-15    132<L>  |  96  |  49<H>  ----------------------------<  161<H>  5.2   |  27  |  7.20<H>    Ca    8.2<L>      15 Nov 2018 08:30  Phos  4.7     11-14    TPro  7.1  /  Alb  2.1<L>  /  TBili  0.6  /  DBili  .40<H>  /  AST  18  /  ALT  17  /  AlkPhos  232<H>  11-15    Sodium, Serum: 132 mmol/L (11-15 @ 08:30)  Sodium, Serum: 134 mmol/L (11-14 @ 08:27)    Potassium, Serum: 5.2 mmol/L (11-15 @ 08:30)  Potassium, Serum: 4.8 mmol/L (11-14 @ 08:27)    Hemoglobin: 8.6 g/dL (11-15 @ 08:30)  Hemoglobin: 8.3 g/dL (11-14 @ 08:27)  Hemoglobin: 8.3 g/dL (11-13 @ 07:46)    Creatinine, Serum 7.20 (11-15 @ 08:30)  Creatinine, Serum 6.00 (11-14 @ 08:27)  Creatinine, Serum 4.60 (11-13 @ 07:46)        LIVER FUNCTIONS - ( 15 Nov 2018 08:30 )  Alb: 2.1 g/dL / Pro: 7.1 g/dL / ALK PHOS: 232 U/L / ALT: 17 U/L / AST: 18 U/L / GGT: x

## 2018-11-15 NOTE — PROGRESS NOTE ADULT - ASSESSMENT
·	ESRD on HD  ·	Cholecystitis, s/p Perc cholecystostomy  ·	Diabetes  ·	Hypertension    HD today. To continue HD TIW as scheduled. Fluid removal as tolerated by BP.   Dietary and PO fluid restriction. Epogen as needed for anemia.  Monitor BP trend. Titrate BP meds as needed. Salt restriction. Surgery follow up.   Monitor blood sugar levels. Insulin coverage as needed. D/c planning.

## 2018-11-15 NOTE — PROGRESS NOTE ADULT - PROBLEM SELECTOR PLAN 1
s/p drainage and tube removal  repeat ct noted, showed possible cbd stone  11/14 sp unsuccessful ercp   check lfts  f/u surgery recs  monitor abd exam  diet as tolerated  if lfts stable/pt remains asymptomatic, from gi perspective ok to dc home w close outpatient gi f/u and monitor given co morbidities   if pt becomes symptomatic will likely need PTC and then ercp  above plan of care dw attg and agreeable s/p drainage and tube removal  repeat ct noted, showed possible cbd stone  11/14 sp unsuccessful ercp   check lfts  f/u surgery recs  monitor abd exam  diet as tolerated  if lfts stable/pt remains asymptomatic, from gi perspective ok to dc home w close outpatient gi f/u and monitor given co morbidities   if pt becomes symptomatic/uptrending lfts will likely need PTC and then ercp  above plan of care dw attg and agreeable

## 2018-11-15 NOTE — PROGRESS NOTE ADULT - SUBJECTIVE AND OBJECTIVE BOX
Patient is a 68y old  Female who presents with a chief complaint of Sepsis (15 Nov 2018 12:41)      INTERVAL HPI:OVERNIGHT EVENTS:  ID: [id number]   Patient seen and examined at bedside. Dialysis today. [place] any abdominal pain, N/V/C/D.      T(F): 97.8 (11-15-18 @ 13:46), Max: 98 (11-14-18 @ 19:46)  HR: 66 (11-15-18 @ 13:46) (54 - 67)  BP: 160/72 (11-15-18 @ 13:46) (133/66 - 176/74)  RR: 18 (11-15-18 @ 13:46) (16 - 20)  SpO2: 96% (11-15-18 @ 13:46) (95% - 99%)  I&O's Summary    14 Nov 2018 07:01  -  15 Nov 2018 07:00  --------------------------------------------------------  IN: 170 mL / OUT: 0 mL / NET: 170 mL    15 Nov 2018 07:01  -  15 Nov 2018 14:44  --------------------------------------------------------  IN: 0 mL / OUT: 2000 mL / NET: -2000 mL        REVIEW OF SYSTEMS:  CONSTITUTIONAL: No fever, weight loss   EYES: No eye pain, visual disturbances, or discharge  RESPIRATORY: No cough, wheezing, chills or hemoptysis; No shortness of breath  CARDIOVASCULAR: No chest pain, palpitations, dizziness, or leg swelling  GASTROINTESTINAL: no abdominal pain, no epigastric pain. No nausea, vomiting, or hematemesis; No diarrhea or constipation. No melena or hematochezia.  GENITOURINARY: No dysuria, frequency, hematuria, or incontinence    PHYSICAL EXAM:  GENERAL: NAD, well-groomed, well-developed  HEAD:  Atraumatic, Normocephalic  NERVOUS SYSTEM:  Alert & Oriented, Good concentration    CHEST/LUNG: Clear to percussion bilaterally; No rales, rhonchi, wheezing, or rubs  HEART: Regular rate and rhythm; No murmurs, rubs, or gallops  ABDOMEN: Soft, nontender,  Nondistended; Bowel sounds present  EXTREMITIES: No clubbing, cyanosis, or edema    LABS:                        8.6    11.20 )-----------( 383      ( 15 Nov 2018 08:30 )             27.7     11-15    132<L>  |  96  |  49<H>  ----------------------------<  161<H>  5.2   |  27  |  7.20<H>    Ca    8.2<L>      15 Nov 2018 08:30  Phos  4.7     11-14    TPro  7.1  /  Alb  2.1<L>  /  TBili  0.6  /  DBili  .40<H>  /  AST  18  /  ALT  17  /  AlkPhos  232<H>  11-15    PT/INR - ( 14 Nov 2018 08:27 )   PT: 14.1 sec;   INR: 1.23 ratio             CAPILLARY BLOOD GLUCOSE      POCT Blood Glucose.: 127 mg/dL (15 Nov 2018 11:31)  POCT Blood Glucose.: 167 mg/dL (15 Nov 2018 08:05)  POCT Blood Glucose.: 185 mg/dL (14 Nov 2018 22:22)  POCT Blood Glucose.: 225 mg/dL (14 Nov 2018 20:47)  POCT Blood Glucose.: 226 mg/dL (14 Nov 2018 19:51)              MEDICATIONS  (STANDING):  aspirin enteric coated 81 milliGRAM(s) Oral daily  atorvastatin 40 milliGRAM(s) Oral at bedtime  calcium carbonate 1250 mG  + Vitamin D (OsCal 500 + D) 1 Tablet(s) Oral daily  chlorhexidine 2% Cloths 1 Application(s) Topical daily  clopidogrel Tablet 75 milliGRAM(s) Oral daily  dextrose 5%. 1000 milliLiter(s) (50 mL/Hr) IV Continuous <Continuous>  dextrose 50% Injectable 12.5 Gram(s) IV Push once  dextrose 50% Injectable 25 Gram(s) IV Push once  dextrose 50% Injectable 25 Gram(s) IV Push once  docusate sodium 100 milliGRAM(s) Oral three times a day  epoetin analilia Injectable 55514 Unit(s) IV Push <User Schedule>  ferrous    sulfate 325 milliGRAM(s) Oral three times a day  gabapentin 300 milliGRAM(s) Oral daily  insulin lispro (HumaLOG) corrective regimen sliding scale   SubCutaneous Before meals and at bedtime  isosorbide   mononitrate ER Tablet (IMDUR) 60 milliGRAM(s) Oral <User Schedule>  isosorbide   mononitrate ER Tablet (IMDUR) 30 milliGRAM(s) Oral every 24 hours  metoprolol tartrate 50 milliGRAM(s) Oral two times a day  NIFEdipine XL 90 milliGRAM(s) Oral daily  ondansetron Injectable 4 milliGRAM(s) IV Push once  pantoprazole    Tablet 40 milliGRAM(s) Oral before breakfast  polyethylene glycol 3350 17 Gram(s) Oral daily  ursodiol Capsule 300 milliGRAM(s) Oral every 12 hours    MEDICATIONS  (PRN):  acetaminophen   Tablet .. 650 milliGRAM(s) Oral every 6 hours PRN Mild Pain (1 - 3)  dextrose 40% Gel 15 Gram(s) Oral once PRN Blood Glucose LESS THAN 70 milliGRAM(s)/deciliter  glucagon  Injectable 1 milliGRAM(s) IntraMuscular once PRN Glucose LESS THAN 70 milligrams/deciliter  simethicone 80 milliGRAM(s) Chew every 6 hours PRN abdominal bloating    Radiology:     < from: CT Abdomen No Cont (11.12.18 @ 15:42) >  IMPRESSION:    Cholecystostomy tube in the right upper quadrant appears outside the   gallbladder consistent with malpositioning of the tube. There is again   evidence of cholelithiasis and inflammatory change surrounding the   gallbladder consistent with cholecystitis. There is suggestion of   choledocholithiasis with a 5 mm stone suggested in the CBD on coronal   image 57 although this was not corroborated on the recent MRCP.  Small left renal cyst  Results were discussed with the referring physician, Dr. Marek Strickland, at 6:40 PM on November 12, 2018. By this time, the tube had   been removed by the interventional radiologist.      ANTONIA PALMER M.D.,ATTENDING RADIOLOGIST  This document has been electronically signed. Nov 12 2018  6:45PM

## 2018-11-16 ENCOUNTER — INPATIENT (INPATIENT)
Facility: HOSPITAL | Age: 68
LOS: 6 days | Discharge: HOME HEALTH | End: 2018-11-23
Attending: INTERNAL MEDICINE | Admitting: INTERNAL MEDICINE
Payer: MEDICAID

## 2018-11-16 VITALS
RESPIRATION RATE: 16 BRPM | WEIGHT: 216.05 LBS | SYSTOLIC BLOOD PRESSURE: 122 MMHG | DIASTOLIC BLOOD PRESSURE: 52 MMHG | TEMPERATURE: 98 F | HEIGHT: 64 IN | OXYGEN SATURATION: 96 % | HEART RATE: 72 BPM

## 2018-11-16 VITALS
RESPIRATION RATE: 18 BRPM | TEMPERATURE: 98 F | SYSTOLIC BLOOD PRESSURE: 149 MMHG | OXYGEN SATURATION: 93 % | DIASTOLIC BLOOD PRESSURE: 73 MMHG | HEART RATE: 73 BPM

## 2018-11-16 DIAGNOSIS — R17 UNSPECIFIED JAUNDICE: ICD-10-CM

## 2018-11-16 DIAGNOSIS — Z90.710 ACQUIRED ABSENCE OF BOTH CERVIX AND UTERUS: Chronic | ICD-10-CM

## 2018-11-16 LAB
ALBUMIN SERPL ELPH-MCNC: 2 G/DL — LOW (ref 3.3–5)
ALP SERPL-CCNC: 466 U/L — HIGH (ref 40–120)
ALT FLD-CCNC: 50 U/L — SIGNIFICANT CHANGE UP (ref 12–78)
AMYLASE P1 CFR SERPL: 99 U/L — SIGNIFICANT CHANGE UP (ref 25–115)
ANION GAP SERPL CALC-SCNC: 11 MMOL/L — SIGNIFICANT CHANGE UP (ref 5–17)
AST SERPL-CCNC: 127 U/L — HIGH (ref 15–37)
BASOPHILS # BLD AUTO: 0 K/UL — SIGNIFICANT CHANGE UP (ref 0–0.2)
BASOPHILS NFR BLD AUTO: 0 % — SIGNIFICANT CHANGE UP (ref 0–2)
BILIRUB DIRECT SERPL-MCNC: 1.8 MG/DL — HIGH (ref 0.05–0.2)
BILIRUB INDIRECT FLD-MCNC: 0.4 MG/DL — SIGNIFICANT CHANGE UP (ref 0.2–1)
BILIRUB SERPL-MCNC: 2.2 MG/DL — HIGH (ref 0.2–1.2)
BUN SERPL-MCNC: 22 MG/DL — SIGNIFICANT CHANGE UP (ref 7–23)
CALCIUM SERPL-MCNC: 8.1 MG/DL — LOW (ref 8.5–10.1)
CHLORIDE SERPL-SCNC: 95 MMOL/L — LOW (ref 96–108)
CO2 SERPL-SCNC: 30 MMOL/L — SIGNIFICANT CHANGE UP (ref 22–31)
CREAT SERPL-MCNC: 4.5 MG/DL — HIGH (ref 0.5–1.3)
EOSINOPHIL # BLD AUTO: 0 K/UL — SIGNIFICANT CHANGE UP (ref 0–0.5)
EOSINOPHIL NFR BLD AUTO: 0 % — SIGNIFICANT CHANGE UP (ref 0–6)
GLUCOSE SERPL-MCNC: 157 MG/DL — HIGH (ref 70–99)
HCT VFR BLD CALC: 28.3 % — LOW (ref 34.5–45)
HGB BLD-MCNC: 8.6 G/DL — LOW (ref 11.5–15.5)
LYMPHOCYTES # BLD AUTO: 0.88 K/UL — LOW (ref 1–3.3)
LYMPHOCYTES # BLD AUTO: 4 % — LOW (ref 13–44)
MCHC RBC-ENTMCNC: 27.5 PG — SIGNIFICANT CHANGE UP (ref 27–34)
MCHC RBC-ENTMCNC: 30.4 GM/DL — LOW (ref 32–36)
MCV RBC AUTO: 90.4 FL — SIGNIFICANT CHANGE UP (ref 80–100)
MONOCYTES # BLD AUTO: 1.75 K/UL — HIGH (ref 0–0.9)
MONOCYTES NFR BLD AUTO: 8 % — SIGNIFICANT CHANGE UP (ref 2–14)
NEUTROPHILS # BLD AUTO: 19.29 K/UL — HIGH (ref 1.8–7.4)
NEUTROPHILS NFR BLD AUTO: 80 % — HIGH (ref 43–77)
PLATELET # BLD AUTO: 343 K/UL — SIGNIFICANT CHANGE UP (ref 150–400)
POTASSIUM SERPL-MCNC: 4.4 MMOL/L — SIGNIFICANT CHANGE UP (ref 3.5–5.3)
POTASSIUM SERPL-SCNC: 4.4 MMOL/L — SIGNIFICANT CHANGE UP (ref 3.5–5.3)
PROT SERPL-MCNC: 7.4 G/DL — SIGNIFICANT CHANGE UP (ref 6–8.3)
RBC # BLD: 3.13 M/UL — LOW (ref 3.8–5.2)
RBC # FLD: 17.6 % — HIGH (ref 10.3–14.5)
SODIUM SERPL-SCNC: 136 MMOL/L — SIGNIFICANT CHANGE UP (ref 135–145)
WBC # BLD: 21.92 K/UL — HIGH (ref 3.8–10.5)
WBC # FLD AUTO: 21.92 K/UL — HIGH (ref 3.8–10.5)

## 2018-11-16 PROCEDURE — C2617: CPT

## 2018-11-16 PROCEDURE — P9059: CPT

## 2018-11-16 PROCEDURE — 93970 EXTREMITY STUDY: CPT

## 2018-11-16 PROCEDURE — 87150 DNA/RNA AMPLIFIED PROBE: CPT

## 2018-11-16 PROCEDURE — C1769: CPT

## 2018-11-16 PROCEDURE — C1894: CPT

## 2018-11-16 PROCEDURE — 85027 COMPLETE CBC AUTOMATED: CPT

## 2018-11-16 PROCEDURE — 82803 BLOOD GASES ANY COMBINATION: CPT

## 2018-11-16 PROCEDURE — 86901 BLOOD TYPING SEROLOGIC RH(D): CPT

## 2018-11-16 PROCEDURE — 85610 PROTHROMBIN TIME: CPT

## 2018-11-16 PROCEDURE — 97116 GAIT TRAINING THERAPY: CPT

## 2018-11-16 PROCEDURE — 99233 SBSQ HOSP IP/OBS HIGH 50: CPT

## 2018-11-16 PROCEDURE — 93005 ELECTROCARDIOGRAM TRACING: CPT

## 2018-11-16 PROCEDURE — 94760 N-INVAS EAR/PLS OXIMETRY 1: CPT

## 2018-11-16 PROCEDURE — 82550 ASSAY OF CK (CPK): CPT

## 2018-11-16 PROCEDURE — 76942 ECHO GUIDE FOR BIOPSY: CPT

## 2018-11-16 PROCEDURE — 87040 BLOOD CULTURE FOR BACTERIA: CPT

## 2018-11-16 PROCEDURE — 80048 BASIC METABOLIC PNL TOTAL CA: CPT

## 2018-11-16 PROCEDURE — 80076 HEPATIC FUNCTION PANEL: CPT

## 2018-11-16 PROCEDURE — 99261: CPT

## 2018-11-16 PROCEDURE — 82248 BILIRUBIN DIRECT: CPT

## 2018-11-16 PROCEDURE — 87075 CULTR BACTERIA EXCEPT BLOOD: CPT

## 2018-11-16 PROCEDURE — 87581 M.PNEUMON DNA AMP PROBE: CPT

## 2018-11-16 PROCEDURE — 83605 ASSAY OF LACTIC ACID: CPT

## 2018-11-16 PROCEDURE — 94660 CPAP INITIATION&MGMT: CPT

## 2018-11-16 PROCEDURE — 84145 PROCALCITONIN (PCT): CPT

## 2018-11-16 PROCEDURE — 87186 SC STD MICRODIL/AGAR DIL: CPT

## 2018-11-16 PROCEDURE — 87633 RESP VIRUS 12-25 TARGETS: CPT

## 2018-11-16 PROCEDURE — 87798 DETECT AGENT NOS DNA AMP: CPT

## 2018-11-16 PROCEDURE — 83690 ASSAY OF LIPASE: CPT

## 2018-11-16 PROCEDURE — 80074 ACUTE HEPATITIS PANEL: CPT

## 2018-11-16 PROCEDURE — 74150 CT ABDOMEN W/O CONTRAST: CPT

## 2018-11-16 PROCEDURE — 99239 HOSP IP/OBS DSCHRG MGMT >30: CPT

## 2018-11-16 PROCEDURE — 84100 ASSAY OF PHOSPHORUS: CPT

## 2018-11-16 PROCEDURE — 83735 ASSAY OF MAGNESIUM: CPT

## 2018-11-16 PROCEDURE — 71045 X-RAY EXAM CHEST 1 VIEW: CPT

## 2018-11-16 PROCEDURE — 80053 COMPREHEN METABOLIC PANEL: CPT

## 2018-11-16 PROCEDURE — 81001 URINALYSIS AUTO W/SCOPE: CPT

## 2018-11-16 PROCEDURE — 87486 CHLMYD PNEUM DNA AMP PROBE: CPT

## 2018-11-16 PROCEDURE — 97530 THERAPEUTIC ACTIVITIES: CPT

## 2018-11-16 PROCEDURE — C1729: CPT

## 2018-11-16 PROCEDURE — 36430 TRANSFUSION BLD/BLD COMPNT: CPT

## 2018-11-16 PROCEDURE — 74181 MRI ABDOMEN W/O CONTRAST: CPT

## 2018-11-16 PROCEDURE — 82553 CREATINE MB FRACTION: CPT

## 2018-11-16 PROCEDURE — 86850 RBC ANTIBODY SCREEN: CPT

## 2018-11-16 PROCEDURE — 85730 THROMBOPLASTIN TIME PARTIAL: CPT

## 2018-11-16 PROCEDURE — 82962 GLUCOSE BLOOD TEST: CPT

## 2018-11-16 PROCEDURE — 97162 PT EVAL MOD COMPLEX 30 MIN: CPT

## 2018-11-16 PROCEDURE — 74176 CT ABD & PELVIS W/O CONTRAST: CPT

## 2018-11-16 PROCEDURE — 74018 RADEX ABDOMEN 1 VIEW: CPT

## 2018-11-16 PROCEDURE — 87086 URINE CULTURE/COLONY COUNT: CPT

## 2018-11-16 PROCEDURE — 83036 HEMOGLOBIN GLYCOSYLATED A1C: CPT

## 2018-11-16 PROCEDURE — 99285 EMERGENCY DEPT VISIT HI MDM: CPT | Mod: 25

## 2018-11-16 PROCEDURE — 71250 CT THORAX DX C-: CPT

## 2018-11-16 PROCEDURE — 96365 THER/PROPH/DIAG IV INF INIT: CPT

## 2018-11-16 PROCEDURE — 86900 BLOOD TYPING SEROLOGIC ABO: CPT

## 2018-11-16 PROCEDURE — 36415 COLL VENOUS BLD VENIPUNCTURE: CPT

## 2018-11-16 PROCEDURE — 82150 ASSAY OF AMYLASE: CPT

## 2018-11-16 PROCEDURE — 36600 WITHDRAWAL OF ARTERIAL BLOOD: CPT

## 2018-11-16 PROCEDURE — 76000 FLUOROSCOPY <1 HR PHYS/QHP: CPT

## 2018-11-16 PROCEDURE — 82140 ASSAY OF AMMONIA: CPT

## 2018-11-16 PROCEDURE — 87205 SMEAR GRAM STAIN: CPT

## 2018-11-16 PROCEDURE — 84484 ASSAY OF TROPONIN QUANT: CPT

## 2018-11-16 PROCEDURE — 87070 CULTURE OTHR SPECIMN AEROBIC: CPT

## 2018-11-16 PROCEDURE — 47490 INCISION OF GALLBLADDER: CPT

## 2018-11-16 RX ORDER — SODIUM CHLORIDE 9 MG/ML
1000 INJECTION, SOLUTION INTRAVENOUS
Qty: 0 | Refills: 0 | Status: DISCONTINUED | OUTPATIENT
Start: 2018-11-16 | End: 2018-11-23

## 2018-11-16 RX ORDER — ISOSORBIDE MONONITRATE 60 MG/1
30 TABLET, EXTENDED RELEASE ORAL DAILY
Qty: 0 | Refills: 0 | Status: DISCONTINUED | OUTPATIENT
Start: 2018-11-16 | End: 2018-11-23

## 2018-11-16 RX ORDER — ATORVASTATIN CALCIUM 80 MG/1
80 TABLET, FILM COATED ORAL AT BEDTIME
Qty: 0 | Refills: 0 | Status: DISCONTINUED | OUTPATIENT
Start: 2018-11-16 | End: 2018-11-23

## 2018-11-16 RX ORDER — SENNA PLUS 8.6 MG/1
2 TABLET ORAL AT BEDTIME
Qty: 0 | Refills: 0 | Status: DISCONTINUED | OUTPATIENT
Start: 2018-11-16 | End: 2018-11-23

## 2018-11-16 RX ORDER — INSULIN GLARGINE 100 [IU]/ML
24 INJECTION, SOLUTION SUBCUTANEOUS AT BEDTIME
Qty: 0 | Refills: 0 | Status: DISCONTINUED | OUTPATIENT
Start: 2018-11-16 | End: 2018-11-19

## 2018-11-16 RX ORDER — SENNA PLUS 8.6 MG/1
2 TABLET ORAL
Qty: 0 | Refills: 0 | COMMUNITY
Start: 2018-11-16

## 2018-11-16 RX ORDER — ONDANSETRON 8 MG/1
4 TABLET, FILM COATED ORAL ONCE
Qty: 0 | Refills: 0 | Status: COMPLETED | OUTPATIENT
Start: 2018-11-16 | End: 2018-11-16

## 2018-11-16 RX ORDER — DEXTROSE 50 % IN WATER 50 %
12.5 SYRINGE (ML) INTRAVENOUS ONCE
Qty: 0 | Refills: 0 | Status: DISCONTINUED | OUTPATIENT
Start: 2018-11-16 | End: 2018-11-23

## 2018-11-16 RX ORDER — ERTAPENEM SODIUM 1 G/1
INJECTION, POWDER, LYOPHILIZED, FOR SOLUTION INTRAMUSCULAR; INTRAVENOUS
Qty: 0 | Refills: 0 | Status: DISCONTINUED | OUTPATIENT
Start: 2018-11-16 | End: 2018-11-16

## 2018-11-16 RX ORDER — GABAPENTIN 400 MG/1
300 CAPSULE ORAL
Qty: 0 | Refills: 0 | Status: DISCONTINUED | OUTPATIENT
Start: 2018-11-16 | End: 2018-11-23

## 2018-11-16 RX ORDER — NIFEDIPINE 30 MG
60 TABLET, EXTENDED RELEASE 24 HR ORAL DAILY
Qty: 0 | Refills: 0 | Status: DISCONTINUED | OUTPATIENT
Start: 2018-11-16 | End: 2018-11-23

## 2018-11-16 RX ORDER — DEXTROSE 50 % IN WATER 50 %
15 SYRINGE (ML) INTRAVENOUS ONCE
Qty: 0 | Refills: 0 | Status: DISCONTINUED | OUTPATIENT
Start: 2018-11-16 | End: 2018-11-23

## 2018-11-16 RX ORDER — ERTAPENEM SODIUM 1 G/1
500 INJECTION, POWDER, LYOPHILIZED, FOR SOLUTION INTRAMUSCULAR; INTRAVENOUS ONCE
Qty: 0 | Refills: 0 | Status: COMPLETED | OUTPATIENT
Start: 2018-11-16 | End: 2018-11-16

## 2018-11-16 RX ORDER — DOCUSATE SODIUM 100 MG
100 CAPSULE ORAL DAILY
Qty: 0 | Refills: 0 | Status: DISCONTINUED | OUTPATIENT
Start: 2018-11-16 | End: 2018-11-23

## 2018-11-16 RX ORDER — ERTAPENEM SODIUM 1 G/1
500 INJECTION, POWDER, LYOPHILIZED, FOR SOLUTION INTRAMUSCULAR; INTRAVENOUS EVERY 24 HOURS
Qty: 0 | Refills: 0 | Status: DISCONTINUED | OUTPATIENT
Start: 2018-11-16 | End: 2018-11-17

## 2018-11-16 RX ORDER — ERTAPENEM SODIUM 1 G/1
500 INJECTION, POWDER, LYOPHILIZED, FOR SOLUTION INTRAMUSCULAR; INTRAVENOUS EVERY 24 HOURS
Qty: 0 | Refills: 0 | Status: DISCONTINUED | OUTPATIENT
Start: 2018-11-17 | End: 2018-11-16

## 2018-11-16 RX ORDER — PANTOPRAZOLE SODIUM 20 MG/1
40 TABLET, DELAYED RELEASE ORAL
Qty: 0 | Refills: 0 | Status: DISCONTINUED | OUTPATIENT
Start: 2018-11-16 | End: 2018-11-23

## 2018-11-16 RX ORDER — FERROUS SULFATE 325(65) MG
1 TABLET ORAL
Qty: 0 | Refills: 0 | COMMUNITY

## 2018-11-16 RX ORDER — ACETAMINOPHEN 500 MG
650 TABLET ORAL EVERY 6 HOURS
Qty: 0 | Refills: 0 | Status: DISCONTINUED | OUTPATIENT
Start: 2018-11-16 | End: 2018-11-23

## 2018-11-16 RX ORDER — METOPROLOL TARTRATE 50 MG
50 TABLET ORAL
Qty: 0 | Refills: 0 | Status: DISCONTINUED | OUTPATIENT
Start: 2018-11-16 | End: 2018-11-23

## 2018-11-16 RX ORDER — GLUCAGON INJECTION, SOLUTION 0.5 MG/.1ML
1 INJECTION, SOLUTION SUBCUTANEOUS ONCE
Qty: 0 | Refills: 0 | Status: DISCONTINUED | OUTPATIENT
Start: 2018-11-16 | End: 2018-11-23

## 2018-11-16 RX ORDER — INSULIN LISPRO 100/ML
VIAL (ML) SUBCUTANEOUS
Qty: 0 | Refills: 0 | Status: DISCONTINUED | OUTPATIENT
Start: 2018-11-16 | End: 2018-11-23

## 2018-11-16 RX ADMIN — SENNA PLUS 2 TABLET(S): 8.6 TABLET ORAL at 22:42

## 2018-11-16 RX ADMIN — GABAPENTIN 300 MILLIGRAM(S): 400 CAPSULE ORAL at 11:05

## 2018-11-16 RX ADMIN — CHLORHEXIDINE GLUCONATE 1 APPLICATION(S): 213 SOLUTION TOPICAL at 11:33

## 2018-11-16 RX ADMIN — Medication 100 MILLIGRAM(S): at 05:26

## 2018-11-16 RX ADMIN — ISOSORBIDE MONONITRATE 60 MILLIGRAM(S): 60 TABLET, EXTENDED RELEASE ORAL at 05:26

## 2018-11-16 RX ADMIN — Medication 325 MILLIGRAM(S): at 05:25

## 2018-11-16 RX ADMIN — HEPARIN SODIUM 5000 UNIT(S): 5000 INJECTION INTRAVENOUS; SUBCUTANEOUS at 13:28

## 2018-11-16 RX ADMIN — POLYETHYLENE GLYCOL 3350 17 GRAM(S): 17 POWDER, FOR SOLUTION ORAL at 11:10

## 2018-11-16 RX ADMIN — ATORVASTATIN CALCIUM 80 MILLIGRAM(S): 80 TABLET, FILM COATED ORAL at 22:42

## 2018-11-16 RX ADMIN — PANTOPRAZOLE SODIUM 40 MILLIGRAM(S): 20 TABLET, DELAYED RELEASE ORAL at 06:02

## 2018-11-16 RX ADMIN — Medication 81 MILLIGRAM(S): at 11:04

## 2018-11-16 RX ADMIN — Medication 50 MILLIGRAM(S): at 05:25

## 2018-11-16 RX ADMIN — ERTAPENEM SODIUM 100 MILLIGRAM(S): 1 INJECTION, POWDER, LYOPHILIZED, FOR SOLUTION INTRAMUSCULAR; INTRAVENOUS at 22:43

## 2018-11-16 RX ADMIN — ERTAPENEM SODIUM 100 MILLIGRAM(S): 1 INJECTION, POWDER, LYOPHILIZED, FOR SOLUTION INTRAMUSCULAR; INTRAVENOUS at 11:10

## 2018-11-16 RX ADMIN — ONDANSETRON 4 MILLIGRAM(S): 8 TABLET, FILM COATED ORAL at 13:26

## 2018-11-16 RX ADMIN — INSULIN GLARGINE 24 UNIT(S): 100 INJECTION, SOLUTION SUBCUTANEOUS at 23:37

## 2018-11-16 RX ADMIN — CLOPIDOGREL BISULFATE 75 MILLIGRAM(S): 75 TABLET, FILM COATED ORAL at 11:04

## 2018-11-16 RX ADMIN — ERYTHROPOIETIN 10000 UNIT(S): 10000 INJECTION, SOLUTION INTRAVENOUS; SUBCUTANEOUS at 11:11

## 2018-11-16 RX ADMIN — Medication 1 TABLET(S): at 11:04

## 2018-11-16 RX ADMIN — Medication 90 MILLIGRAM(S): at 05:25

## 2018-11-16 RX ADMIN — Medication 2: at 08:09

## 2018-11-16 RX ADMIN — URSODIOL 300 MILLIGRAM(S): 250 TABLET, FILM COATED ORAL at 05:36

## 2018-11-16 RX ADMIN — ONDANSETRON 4 MILLIGRAM(S): 8 TABLET, FILM COATED ORAL at 06:42

## 2018-11-16 RX ADMIN — HEPARIN SODIUM 5000 UNIT(S): 5000 INJECTION INTRAVENOUS; SUBCUTANEOUS at 05:26

## 2018-11-16 NOTE — PROGRESS NOTE ADULT - PROVIDER SPECIALTY LIST ADULT
Anesthesia
Cardiology
Critical Care
Gastroenterology
Hospitalist
Infectious Disease
Intervent Radiology
Nephrology
Surgery
Cardiology
Surgery
Surgery
Cardiology
Critical Care
Cardiology
Nephrology
Nephrology
Gastroenterology
Hospitalist
Hospitalist

## 2018-11-16 NOTE — PROGRESS NOTE ADULT - SUBJECTIVE AND OBJECTIVE BOX
[facility] -- Glynn uBllock, Susan, Claudia, Morgan Sams Savella  Office # [phone]      Follow Up:  Elevated trop and AF    Subjective/Observations: Seen and examined.  Lying flat in bed lethargic daughter at bedside, denies abdominal pain presently.  Plan to transfer to [facility] today.        REVIEW OF SYSTEMS: All other review of systems is negative unless indicated above    PAST MEDICAL & SURGICAL HISTORY:  ESRD (end stage renal disease) on dialysis: MWF  CAD (coronary artery disease): s/p stent in 2007  Diabetes  Myocardial infarction  Hypertension  H/O: hysterectomy      MEDICATIONS  (STANDING):  aspirin enteric coated 81 milliGRAM(s) Oral daily  atorvastatin 40 milliGRAM(s) Oral at bedtime  calcium carbonate 1250 mG  + Vitamin D (OsCal 500 + D) 1 Tablet(s) Oral daily  chlorhexidine 2% Cloths 1 Application(s) Topical daily  clopidogrel Tablet 75 milliGRAM(s) Oral daily  dextrose 5%. 1000 milliLiter(s) (50 mL/Hr) IV Continuous <Continuous>  dextrose 50% Injectable 12.5 Gram(s) IV Push once  dextrose 50% Injectable 25 Gram(s) IV Push once  dextrose 50% Injectable 25 Gram(s) IV Push once  docusate sodium 100 milliGRAM(s) Oral three times a day  epoetin analilia Injectable 26226 Unit(s) IV Push <User Schedule>  ertapenem  IVPB      ferrous    sulfate 325 milliGRAM(s) Oral three times a day  gabapentin 300 milliGRAM(s) Oral daily  heparin  Injectable 5000 Unit(s) SubCutaneous every 8 hours  insulin lispro (HumaLOG) corrective regimen sliding scale   SubCutaneous Before meals and at bedtime  isosorbide   mononitrate ER Tablet (IMDUR) 60 milliGRAM(s) Oral <User Schedule>  isosorbide   mononitrate ER Tablet (IMDUR) 30 milliGRAM(s) Oral every 24 hours  metoprolol tartrate 50 milliGRAM(s) Oral two times a day  NIFEdipine XL 90 milliGRAM(s) Oral daily  pantoprazole    Tablet 40 milliGRAM(s) Oral before breakfast  polyethylene glycol 3350 17 Gram(s) Oral daily  senna 2 Tablet(s) Oral at bedtime  ursodiol Capsule 300 milliGRAM(s) Oral every 12 hours    MEDICATIONS  (PRN):  acetaminophen   Tablet .. 650 milliGRAM(s) Oral every 6 hours PRN Mild Pain (1 - 3)  dextrose 40% Gel 15 Gram(s) Oral once PRN Blood Glucose LESS THAN 70 milliGRAM(s)/deciliter  glucagon  Injectable 1 milliGRAM(s) IntraMuscular once PRN Glucose LESS THAN 70 milligrams/deciliter  simethicone 80 milliGRAM(s) Chew every 6 hours PRN abdominal bloating      Allergies    No Known Drug Allergies  Purell (Rash)    Intolerances            Vital Signs Last 24 Hrs  T(C): 36.9 (16 Nov 2018 05:52), Max: 37.7 (16 Nov 2018 00:47)  T(F): 98.4 (16 Nov 2018 05:52), Max: 99.9 (16 Nov 2018 00:47)  HR: 85 (16 Nov 2018 05:52) (66 - 89)  BP: 149/77 (16 Nov 2018 05:52) (130/68 - 176/74)  BP(mean): --  RR: 18 (16 Nov 2018 05:52) (18 - 18)  SpO2: 92% (16 Nov 2018 05:52) (92% - 100%)    I&O's Summary    15 Nov 2018 07:01  -  16 Nov 2018 07:00  --------------------------------------------------------  IN: 0 mL / OUT: 2400 mL / NET: -2400 mL          PHYSICAL EXAM:  TELE: Not on tele  Constitutional: NAD, awake, lethargic, well-developed, obese  HEENT: Moist Mucous Membranes, Anicteric  Pulmonary: Non-labored, breath sounds are clear anteriorly  Cardiovascular: Regular, S1 and S2, No murmurs, rubs, gallops or clicks  Gastrointestinal: Bowel Sounds present, soft, nontender.   Lymph: No peripheral edema. No lymphadenopathy.  Skin: No visible rashes or ulcers.  Psych:  Mood & affect flat     LABS: All Labs Reviewed:                        8.6    21.92 )-----------( 343      ( 16 Nov 2018 08:46 )             28.3                         8.6    11.20 )-----------( 383      ( 15 Nov 2018 08:30 )             27.7                         8.3    10.34 )-----------( 371      ( 14 Nov 2018 08:27 )             27.3     16 Nov 2018 08:46    136    |  95     |  22     ----------------------------<  157    4.4     |  30     |  4.50   15 Nov 2018 21:51    134    |  95     |  18     ----------------------------<  208    4.2     |  31     |  3.70   15 Nov 2018 08:30    132    |  96     |  49     ----------------------------<  161    5.2     |  27     |  7.20     Ca    8.1        16 Nov 2018 08:46  Ca    8.5        15 Nov 2018 21:51  Ca    8.2        15 Nov 2018 08:30  Phos  4.7       14 Nov 2018 08:27    TPro  7.4    /  Alb  2.0    /  TBili  2.2    /  DBili  1.80   /  AST  127    /  ALT  50     /  AlkPhos  466    16 Nov 2018 08:46  TPro  7.1    /  Alb  2.1    /  TBili  0.6    /  DBili  .40    /  AST  18     /  ALT  17     /  AlkPhos  232    15 Nov 2018 08:30  TPro  6.9    /  Alb  1.9    /  TBili  0.5    /  DBili  .30    /  AST  17     /  ALT  15     /  AlkPhos  232    14 Nov 2018 08:27    < from: 12 Lead ECG (11.02.18 @ 08:59) >  Ventricular Rate 72 BPM    Atrial Rate 72 BPM    P-R Interval 156 ms    QRS Duration 156 ms    Q-T Interval 444 ms    QTC Calculation(Bezet) 486 ms    P Axis 50 degrees    R Axis 90 degrees    T Axis -14 degrees    Diagnosis Line Normal sinus rhythm  Right bundle branch block  Inferior infarct (cited on or before 31-OCT-2018)  Abnormal ECG    Confirmed by Jovanna Hunter ([id number]) on 11/3/2018 11:50:16 AM    < end of copied text >      < from: TTE Echo Doppler w/o Cont (05.04.18 @ 20:56) >   EXAM:  ECHO TTE W/O CON COMP W/DOPPLR         PROCEDURE DATE:  05/04/2018        INTERPRETATION:  Height 165cm. weight 95kg. blood pressure 129/81.   Technician TE. Indication for study dyspnea.    Aortic root 2.3 cm left atrium 4.4 cm left ventricular end-diastolic   diameter 5.7 cm left ventricular end-systolic diameter 4.3 cm septal wall   thickness 1.2 cm posterior wall thickness 1.2 cm visually equivocally   estimated ejection fraction 50-55%.    The aortic root is calcified and of normaldiameter. 3 distinct leaflets   of the aortic valve are not well demonstrated by this study. The   technician was unable to identify any significant gradient across this   valve to suggest hemodynamically significant aortic stenosis. Left atrium   isenlarged. The left ventricle was difficult to visualize by all   standard echocardiographic windows. Possibly the end-diastolic diameter   is mildly increased. The wall thickness is borderline increased. Any   significant focal wall motion abnormality cannot be ascertained by this   study. Visually estimated ejection fraction 50-55%. The mitral annulus is   calcified. The mitral valve leaflets are mildly thickened with a normal   EF slope and excursion. There is no evidence for hemodynamically   significant mitral stenosis. On the very limited color flow Doppler   portion examination mild mitral regurgitation suggested.    Conclusion:  1. Technically difficult limited supine study. Please note that this is a   portable study and the study is also limited due to patient's body   habitus.  2. 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  3. Enlarged left atrium with associated mild mitral regurgitation.  4. Very limited visualization left ventricle which may represent mild   left ventricular concentric hypertrophy with a well-preserved ejection   fraction as described above.  5. A very small posterior pericardial effusion is suggested but not   diagnostic.  6. Correlate these limited findings clinically.                    SALVADOR PHILLIPS M.D., ATTENDING CARDIOLOGIST  This document has been electronically signed. May  5 2018  9:47AM    < end of copied text >    < from: Xray Chest 1 View- PORTABLE-Urgent (11.15.18 @ 20:12) >  EXAM:  XR CHEST PORTABLE URGENT 1V                            PROCEDURE DATE:  11/15/2018          INTERPRETATION:  History: Shortness of breath and cough    Portable radiograph of the chest is performed and compared to 10/31/2018.    The heart size is borderline. There is mild pulmonary vascular   congestion. Right-sided central venous catheter is seen with the tip   overlying the right atrium. There is osteopenia and degenerative change   of bony structures. Previously seen NG tube is no longer present.    Impression: Pulmonary vascular congestion.  Right-sided dialysis catheter.                ANTONIA PALMER M.D.,ATTENDING RADIOLOGIST  This document has been electronically signed. Nov 15 2018  8:15PM        < end of copied text > [facility] -- Glynn Bullock, Claudia Davis, Morgan Sams Savella  Office # [phone]      Follow Up:  Elevated trop and AF    Subjective/Observations: Seen and examined.  Lying flat in bed lethargic daughter at bedside, denies abdominal pain presently.  Was very uncomfortable overnight with severe pain. Plan to transfer to [facility] today.        REVIEW OF SYSTEMS: All other review of systems is negative unless indicated above    PAST MEDICAL & SURGICAL HISTORY:  ESRD (end stage renal disease) on dialysis: MWF  CAD (coronary artery disease): s/p stent in 2007  Diabetes  Myocardial infarction  Hypertension  H/O: hysterectomy      MEDICATIONS  (STANDING):  aspirin enteric coated 81 milliGRAM(s) Oral daily  atorvastatin 40 milliGRAM(s) Oral at bedtime  calcium carbonate 1250 mG  + Vitamin D (OsCal 500 + D) 1 Tablet(s) Oral daily  chlorhexidine 2% Cloths 1 Application(s) Topical daily  clopidogrel Tablet 75 milliGRAM(s) Oral daily  dextrose 5%. 1000 milliLiter(s) (50 mL/Hr) IV Continuous <Continuous>  dextrose 50% Injectable 12.5 Gram(s) IV Push once  dextrose 50% Injectable 25 Gram(s) IV Push once  dextrose 50% Injectable 25 Gram(s) IV Push once  docusate sodium 100 milliGRAM(s) Oral three times a day  epoetin analilia Injectable 85377 Unit(s) IV Push <User Schedule>  ertapenem  IVPB      ferrous    sulfate 325 milliGRAM(s) Oral three times a day  gabapentin 300 milliGRAM(s) Oral daily  heparin  Injectable 5000 Unit(s) SubCutaneous every 8 hours  insulin lispro (HumaLOG) corrective regimen sliding scale   SubCutaneous Before meals and at bedtime  isosorbide   mononitrate ER Tablet (IMDUR) 60 milliGRAM(s) Oral <User Schedule>  isosorbide   mononitrate ER Tablet (IMDUR) 30 milliGRAM(s) Oral every 24 hours  metoprolol tartrate 50 milliGRAM(s) Oral two times a day  NIFEdipine XL 90 milliGRAM(s) Oral daily  pantoprazole    Tablet 40 milliGRAM(s) Oral before breakfast  polyethylene glycol 3350 17 Gram(s) Oral daily  senna 2 Tablet(s) Oral at bedtime  ursodiol Capsule 300 milliGRAM(s) Oral every 12 hours    MEDICATIONS  (PRN):  acetaminophen   Tablet .. 650 milliGRAM(s) Oral every 6 hours PRN Mild Pain (1 - 3)  dextrose 40% Gel 15 Gram(s) Oral once PRN Blood Glucose LESS THAN 70 milliGRAM(s)/deciliter  glucagon  Injectable 1 milliGRAM(s) IntraMuscular once PRN Glucose LESS THAN 70 milligrams/deciliter  simethicone 80 milliGRAM(s) Chew every 6 hours PRN abdominal bloating      Allergies    No Known Drug Allergies  Purell (Rash)    Intolerances            Vital Signs Last 24 Hrs  T(C): 36.9 (16 Nov 2018 05:52), Max: 37.7 (16 Nov 2018 00:47)  T(F): 98.4 (16 Nov 2018 05:52), Max: 99.9 (16 Nov 2018 00:47)  HR: 85 (16 Nov 2018 05:52) (66 - 89)  BP: 149/77 (16 Nov 2018 05:52) (130/68 - 176/74)  BP(mean): --  RR: 18 (16 Nov 2018 05:52) (18 - 18)  SpO2: 92% (16 Nov 2018 05:52) (92% - 100%)    I&O's Summary    15 Nov 2018 07:01  -  16 Nov 2018 07:00  --------------------------------------------------------  IN: 0 mL / OUT: 2400 mL / NET: -2400 mL          PHYSICAL EXAM:  TELE: Not on tele  Constitutional: NAD, awake, lethargic, well-developed, obese  HEENT: Moist Mucous Membranes, Anicteric  Pulmonary: Non-labored, breath sounds are clear anteriorly  Cardiovascular: Regular, S1 and S2, No murmurs, rubs, gallops or clicks  Gastrointestinal: Bowel Sounds present, soft, mildly tender.   Lymph: No peripheral edema. No lymphadenopathy.  Skin: No visible rashes or ulcers.  Psych:  Mood & affect flat     LABS: All Labs Reviewed:                        8.6    21.92 )-----------( 343      ( 16 Nov 2018 08:46 )             28.3                         8.6    11.20 )-----------( 383      ( 15 Nov 2018 08:30 )             27.7                         8.3    10.34 )-----------( 371      ( 14 Nov 2018 08:27 )             27.3     16 Nov 2018 08:46    136    |  95     |  22     ----------------------------<  157    4.4     |  30     |  4.50   15 Nov 2018 21:51    134    |  95     |  18     ----------------------------<  208    4.2     |  31     |  3.70   15 Nov 2018 08:30    132    |  96     |  49     ----------------------------<  161    5.2     |  27     |  7.20     Ca    8.1        16 Nov 2018 08:46  Ca    8.5        15 Nov 2018 21:51  Ca    8.2        15 Nov 2018 08:30  Phos  4.7       14 Nov 2018 08:27    TPro  7.4    /  Alb  2.0    /  TBili  2.2    /  DBili  1.80   /  AST  127    /  ALT  50     /  AlkPhos  466    16 Nov 2018 08:46  TPro  7.1    /  Alb  2.1    /  TBili  0.6    /  DBili  .40    /  AST  18     /  ALT  17     /  AlkPhos  232    15 Nov 2018 08:30  TPro  6.9    /  Alb  1.9    /  TBili  0.5    /  DBili  .30    /  AST  17     /  ALT  15     /  AlkPhos  232    14 Nov 2018 08:27    < from: 12 Lead ECG (11.02.18 @ 08:59) >  Ventricular Rate 72 BPM    Atrial Rate 72 BPM    P-R Interval 156 ms    QRS Duration 156 ms    Q-T Interval 444 ms    QTC Calculation(Bezet) 486 ms    P Axis 50 degrees    R Axis 90 degrees    T Axis -14 degrees    Diagnosis Line Normal sinus rhythm  Right bundle branch block  Inferior infarct (cited on or before 31-OCT-2018)  Abnormal ECG    Confirmed by Jovanna Hunter ([id number]) on 11/3/2018 11:50:16 AM    < end of copied text >      < from: TTE Echo Doppler w/o Cont (05.04.18 @ 20:56) >   EXAM:  ECHO TTE W/O CON COMP W/DOPPLR         PROCEDURE DATE:  05/04/2018        INTERPRETATION:  Height 165cm. weight 95kg. blood pressure 129/81.   Technician TE. Indication for study dyspnea.    Aortic root 2.3 cm left atrium 4.4 cm left ventricular end-diastolic   diameter 5.7 cm left ventricular end-systolic diameter 4.3 cm septal wall   thickness 1.2 cm posterior wall thickness 1.2 cm visually equivocally   estimated ejection fraction 50-55%.    The aortic root is calcified and of normaldiameter. 3 distinct leaflets   of the aortic valve are not well demonstrated by this study. The   technician was unable to identify any significant gradient across this   valve to suggest hemodynamically significant aortic stenosis. Left atrium   isenlarged. The left ventricle was difficult to visualize by all   standard echocardiographic windows. Possibly the end-diastolic diameter   is mildly increased. The wall thickness is borderline increased. Any   significant focal wall motion abnormality cannot be ascertained by this   study. Visually estimated ejection fraction 50-55%. The mitral annulus is   calcified. The mitral valve leaflets are mildly thickened with a normal   EF slope and excursion. There is no evidence for hemodynamically   significant mitral stenosis. On the very limited color flow Doppler   portion examination mild mitral regurgitation suggested.    Conclusion:  1. Technically difficult limited supine study. Please note that this is a   portable study and the study is also limited due to patient's body   habitus.  2. 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  3. Enlarged left atrium with associated mild mitral regurgitation.  4. Very limited visualization left ventricle which may represent mild   left ventricular concentric hypertrophy with a well-preserved ejection   fraction as described above.  5. A very small posterior pericardial effusion is suggested but not   diagnostic.  6. Correlate these limited findings clinically.                    SALVADOR PHILLIPS M.D., ATTENDING CARDIOLOGIST  This document has been electronically signed. May  5 2018  9:47AM    < end of copied text >    < from: Xray Chest 1 View- PORTABLE-Urgent (11.15.18 @ 20:12) >  EXAM:  XR CHEST PORTABLE URGENT 1V                            PROCEDURE DATE:  11/15/2018          INTERPRETATION:  History: Shortness of breath and cough    Portable radiograph of the chest is performed and compared to 10/31/2018.    The heart size is borderline. There is mild pulmonary vascular   congestion. Right-sided central venous catheter is seen with the tip   overlying the right atrium. There is osteopenia and degenerative change   of bony structures. Previously seen NG tube is no longer present.    Impression: Pulmonary vascular congestion.  Right-sided dialysis catheter.                ANTONIA PALMER M.D.,ATTENDING RADIOLOGIST  This document has been electronically signed. Nov 15 2018  8:15PM        < end of copied text >

## 2018-11-16 NOTE — PROGRESS NOTE ADULT - ASSESSMENT
Patient is now status post cholecystotomy tube for biliary sepsis on 10/30.  She had rapid atrial fibrillation on 10/30 and converted to sinus rhythm. She required pressor therapy which has now been discontinued. Her white blood count improved today remains hemodynamically stable. She has chronic renal failure on hemodialysis. Her elevated troponin level may represent a component of subendocardial ischemia without true atherothrombosis. These results are nonspecific in the setting of her kidney disease. Currently afebrile and arousable with no evidence of decompensated heart failure or active ischemic.   s/p unsuccessful ERCP yesterday    Recommend:  - s/p fall with no overt injuries.  Fall precautions.  - cont dual antiplatelet therapy for now for CAD s/p stents  - DVT prophylaxis  - cont statin   - cont metoprolol   - cont isosorbide mononitrate   Continue nifedipine 90 mg for HTN   -BP cont to be elevated possibly for acute process with abdominal pain  - Failed ERCP - now with rising LFTs, Bili and Lipase- GI and surgery following -  for transfer today to [facility]   - HD per Renal.  Last HD 11/15 2L removed  - Will continue to follow while inpatient    Isabella Vega NP-C  Cardiology Patient is now status post cholecystotomy tube for biliary sepsis on 10/30.  She had rapid atrial fibrillation on 10/30 and converted to sinus rhythm. She required pressor therapy which has now been discontinued. Her white blood count improved today remains hemodynamically stable. She has chronic renal failure on hemodialysis. Her elevated troponin level may represent a component of subendocardial ischemia without true atherothrombosis. These results are nonspecific in the setting of her kidney disease. Currently afebrile and arousable with no evidence of decompensated heart failure or active ischemia. s/p unsuccessful ERCP, severe pain overnight with rising lfts, now felt related to underlying gallbladder issue, such that transfer to [facility] is planned.    Recommend:  - s/p fall with no overt injuries.  Fall precautions.  - for cad would cont dual antiplatelet therapy for now, though may need to be held in anticipation of possible surgery at [facility]. cont statin. cont metoprolol.  cont isosorbide mononitrate  -for htn continue nifedipine 90   - volume management with hd as per nephrology  - with pain, rising LFTs, Bili and Lipase, for transfer today to [facility]     - Will continue to follow while inpatient    Isabella Vega NP-C  Cardiology

## 2018-11-16 NOTE — H&P ADULT - PMH
CAD (coronary artery disease)  s/p stent in 2007  Diabetes    ESRD (end stage renal disease) on dialysis  MWF  Hypertension    Myocardial infarction

## 2018-11-16 NOTE — PROGRESS NOTE ADULT - SUBJECTIVE AND OBJECTIVE BOX
Patient is a 68y old  Female who presents with a chief complaint of Sepsis (16 Nov 2018 13:49)      INTERVAL HPI/OVERNIGHT EVENTS: Patient seen and examined at bedside. Overnight patient had an episode of vomiting, along with abdominal pain. This AM patient seen and examined at bedside. Appears more weak than previously, with abdominal pain on palpation, along with distension. Patient was made NPO. Denied chest pain, SOB, diarrhea fevers, chills. Patient is for transfer to [facility].    T(F): 99 (11-16-18 @ 12:44), Max: 99.9 (11-16-18 @ 00:47)  HR: 77 (11-16-18 @ 12:44) (74 - 89)  BP: 128/70 (11-16-18 @ 12:44) (128/70 - 176/74)  RR: 19 (11-16-18 @ 12:44) (18 - 19)  SpO2: 90% (11-16-18 @ 12:44) (90% - 100%)  I&O's Summary    15 Nov 2018 07:01  -  16 Nov 2018 07:00  --------------------------------------------------------  IN: 0 mL / OUT: 2400 mL / NET: -2400 mL    16 Nov 2018 07:01  -  16 Nov 2018 16:00  --------------------------------------------------------  IN: 0 mL / OUT: 200 mL / NET: -200 mL        REVIEW OF SYSTEMS:  CONSTITUTIONAL: +weakness, No fever, weight loss   RESPIRATORY: No cough, wheezing, chills or hemoptysis; No shortness of breath  CARDIOVASCULAR: No chest pain, palpitations, dizziness, or leg swelling  GASTROINTESTINAL: + abdominal pain and vomiting,  no epigastric pain. No hematemesis; No diarrhea or constipation. No melena or hematochezia.  GENITOURINARY: No dysuria, frequency, hematuria, or incontinence      PHYSICAL EXAM:  GENERAL: well-groomed, well-developed  HEAD:  Atraumatic, Normocephalic  NERVOUS SYSTEM:  Alert & Oriented, Good concentration    CHEST/LUNG: + minimal  crackles bilaterally  HEART: Regular rate and rhythm; No murmurs, rubs, or gallops  ABDOMEN: distended, tender to palpation mid upper abdomen and RUQ, +Bowel sounds present  EXTREMITIES: No clubbing, cyanosis, or edema      LABS:                        8.6    21.92 )-----------( 343      ( 16 Nov 2018 08:46 )             28.3     11-16    136  |  95<L>  |  22  ----------------------------<  157<H>  4.4   |  30  |  4.50<H>    Ca    8.1<L>      16 Nov 2018 08:46    TPro  7.4  /  Alb  2.0<L>  /  TBili  2.2<H>  /  DBili  1.80<H>  /  AST  127<H>  /  ALT  50  /  AlkPhos  466<H>  11-16        CAPILLARY BLOOD GLUCOSE      POCT Blood Glucose.: 153 mg/dL (16 Nov 2018 11:32)  POCT Blood Glucose.: 178 mg/dL (16 Nov 2018 08:00)  POCT Blood Glucose.: 216 mg/dL (15 Nov 2018 21:12)  POCT Blood Glucose.: 217 mg/dL (15 Nov 2018 16:54)      MEDICATIONS  (STANDING):  atorvastatin 40 milliGRAM(s) Oral at bedtime  calcium carbonate 1250 mG  + Vitamin D (OsCal 500 + D) 1 Tablet(s) Oral daily  chlorhexidine 2% Cloths 1 Application(s) Topical daily  dextrose 5%. 1000 milliLiter(s) (50 mL/Hr) IV Continuous <Continuous>  dextrose 50% Injectable 12.5 Gram(s) IV Push once  dextrose 50% Injectable 25 Gram(s) IV Push once  dextrose 50% Injectable 25 Gram(s) IV Push once  docusate sodium 100 milliGRAM(s) Oral three times a day  epoetin analilia Injectable 53565 Unit(s) IV Push <User Schedule>  ertapenem  IVPB      ferrous    sulfate 325 milliGRAM(s) Oral three times a day  gabapentin 300 milliGRAM(s) Oral daily  insulin lispro (HumaLOG) corrective regimen sliding scale   SubCutaneous Before meals and at bedtime  isosorbide   mononitrate ER Tablet (IMDUR) 60 milliGRAM(s) Oral <User Schedule>  isosorbide   mononitrate ER Tablet (IMDUR) 30 milliGRAM(s) Oral every 24 hours  metoprolol tartrate 50 milliGRAM(s) Oral two times a day  NIFEdipine XL 90 milliGRAM(s) Oral daily  pantoprazole    Tablet 40 milliGRAM(s) Oral before breakfast  polyethylene glycol 3350 17 Gram(s) Oral daily  senna 2 Tablet(s) Oral at bedtime  ursodiol Capsule 300 milliGRAM(s) Oral every 12 hours    MEDICATIONS  (PRN):  acetaminophen   Tablet .. 650 milliGRAM(s) Oral every 6 hours PRN Mild Pain (1 - 3)  dextrose 40% Gel 15 Gram(s) Oral once PRN Blood Glucose LESS THAN 70 milliGRAM(s)/deciliter  glucagon  Injectable 1 milliGRAM(s) IntraMuscular once PRN Glucose LESS THAN 70 milligrams/deciliter  simethicone 80 milliGRAM(s) Chew every 6 hours PRN abdominal bloating Patient is a 68y old  Female who presents with a chief complaint of Sepsis (16 Nov 2018 13:49)      INTERVAL HPI/OVERNIGHT EVENTS: Patient seen and examined at bedside. Overnight patient had an episode of vomiting, along with abdominal pain. This AM patient seen and examined at bedside. Appears more weak than previously, with abdominal pain on palpation, along with distension. Patient was made NPO. Denied chest pain, SOB, diarrhea fevers, chills. Patient is for transfer to [facility].    T(F): 99 (11-16-18 @ 12:44), Max: 99.9 (11-16-18 @ 00:47)  HR: 77 (11-16-18 @ 12:44) (74 - 89)  BP: 128/70 (11-16-18 @ 12:44) (128/70 - 176/74)  RR: 19 (11-16-18 @ 12:44) (18 - 19)  SpO2: 90% (11-16-18 @ 12:44) (90% - 100%)  I&O's Summary    15 Nov 2018 07:01  -  16 Nov 2018 07:00  --------------------------------------------------------  IN: 0 mL / OUT: 2400 mL / NET: -2400 mL    16 Nov 2018 07:01  -  16 Nov 2018 16:00  --------------------------------------------------------  IN: 0 mL / OUT: 200 mL / NET: -200 mL        REVIEW OF SYSTEMS:  CONSTITUTIONAL: +weakness, No fever, weight loss   RESPIRATORY: No cough, wheezing, chills or hemoptysis; No shortness of breath  CARDIOVASCULAR: No chest pain, palpitations, dizziness, or leg swelling  GASTROINTESTINAL: + abdominal pain and vomiting,  no epigastric pain. No hematemesis; No diarrhea or constipation. No melena or hematochezia.  GENITOURINARY: No dysuria, frequency, hematuria, or incontinence      PHYSICAL EXAM:  GENERAL: well-groomed, well-developed, lethargic  HEAD:  Atraumatic, Normocephalic  NERVOUS SYSTEM:  Alert & Oriented, Good concentration    CHEST/LUNG: + minimal  crackles bilaterally  HEART: Regular rate and rhythm; No murmurs, rubs, or gallops  ABDOMEN: distended, tender to palpation mid upper abdomen and RUQ, +Bowel sounds present  EXTREMITIES: No clubbing, cyanosis, or edema      LABS:                        8.6    21.92 )-----------( 343      ( 16 Nov 2018 08:46 )             28.3     11-16    136  |  95<L>  |  22  ----------------------------<  157<H>  4.4   |  30  |  4.50<H>    Ca    8.1<L>      16 Nov 2018 08:46    TPro  7.4  /  Alb  2.0<L>  /  TBili  2.2<H>  /  DBili  1.80<H>  /  AST  127<H>  /  ALT  50  /  AlkPhos  466<H>  11-16        CAPILLARY BLOOD GLUCOSE      POCT Blood Glucose.: 153 mg/dL (16 Nov 2018 11:32)  POCT Blood Glucose.: 178 mg/dL (16 Nov 2018 08:00)  POCT Blood Glucose.: 216 mg/dL (15 Nov 2018 21:12)  POCT Blood Glucose.: 217 mg/dL (15 Nov 2018 16:54)      MEDICATIONS  (STANDING):  atorvastatin 40 milliGRAM(s) Oral at bedtime  calcium carbonate 1250 mG  + Vitamin D (OsCal 500 + D) 1 Tablet(s) Oral daily  chlorhexidine 2% Cloths 1 Application(s) Topical daily  dextrose 5%. 1000 milliLiter(s) (50 mL/Hr) IV Continuous <Continuous>  dextrose 50% Injectable 12.5 Gram(s) IV Push once  dextrose 50% Injectable 25 Gram(s) IV Push once  dextrose 50% Injectable 25 Gram(s) IV Push once  docusate sodium 100 milliGRAM(s) Oral three times a day  epoetin analilia Injectable 80649 Unit(s) IV Push <User Schedule>  ertapenem  IVPB      ferrous    sulfate 325 milliGRAM(s) Oral three times a day  gabapentin 300 milliGRAM(s) Oral daily  insulin lispro (HumaLOG) corrective regimen sliding scale   SubCutaneous Before meals and at bedtime  isosorbide   mononitrate ER Tablet (IMDUR) 60 milliGRAM(s) Oral <User Schedule>  isosorbide   mononitrate ER Tablet (IMDUR) 30 milliGRAM(s) Oral every 24 hours  metoprolol tartrate 50 milliGRAM(s) Oral two times a day  NIFEdipine XL 90 milliGRAM(s) Oral daily  pantoprazole    Tablet 40 milliGRAM(s) Oral before breakfast  polyethylene glycol 3350 17 Gram(s) Oral daily  senna 2 Tablet(s) Oral at bedtime  ursodiol Capsule 300 milliGRAM(s) Oral every 12 hours    MEDICATIONS  (PRN):  acetaminophen   Tablet .. 650 milliGRAM(s) Oral every 6 hours PRN Mild Pain (1 - 3)  dextrose 40% Gel 15 Gram(s) Oral once PRN Blood Glucose LESS THAN 70 milliGRAM(s)/deciliter  glucagon  Injectable 1 milliGRAM(s) IntraMuscular once PRN Glucose LESS THAN 70 milligrams/deciliter  simethicone 80 milliGRAM(s) Chew every 6 hours PRN abdominal bloating

## 2018-11-16 NOTE — PROGRESS NOTE ADULT - REASON FOR ADMISSION
Sepsis
Sepsis, cholecystitis
Sepsis, choledocholithiasis
Sepsis

## 2018-11-16 NOTE — PROGRESS NOTE ADULT - PROBLEM SELECTOR PLAN 1
s/p drainage and tube removal by IR  repeat ct noted, showed possible cbd stone  11/14 underwent unsuccessful ercp   now w leukocytosis and uptrending lfts  remains afebrile, HD stable  pending transfer to [facility] for possible repeat ercp by advanced endo vs PTC  npo/ivf  cont abx  cont ppi, zofran prn  pain control  monitor abd exam  trend labs  surgery following  above plan of care dw attg and agreeable; melinda med attg s/p drainage and tube removal by IR  repeat ct noted, showed possible cbd stone  11/14 underwent unsuccessful ercp   now w leukocytosis and uptrending lfts  remains afebrile, HD stable  pending transfer to [facility] for possible repeat ercp by advanced endo vs PTC  npo/ivf  cont abx  cont ppi, zofran prn  pain control  monitor abd exam  trend labs  surgery following  would hold asa/plavix as able given planned procedures  above plan of care dw attg and agreeable; melinda med attg

## 2018-11-16 NOTE — PROGRESS NOTE ADULT - PROBLEM SELECTOR PLAN 1
-This AM patient with abdominal pain on palpation, and 1 episode of vomiting, elevated WBC, liver enzymes trending up, elevated bilirubin, and elevated procalc, given Invanz, for transfer to [facility] today.    -Possibly 2/2 to choledocholithiasis, vs ERCP     -NPO   -Blood Cx + for ESBL. E.coli, repeat BC negative   -cholecystostomy tube removed on 11/12   -repeat CT on 11/12: suggestion of choledocholithiasis with a 5 mm stone suggested in the CBD  -S/P ERCP, stone unable to be removed.    -Surgery following  -GI following   -For transfer to [facility] today

## 2018-11-16 NOTE — PROGRESS NOTE ADULT - NSHPATTENDINGPLANDISCUSS_GEN_ALL_CORE
team
NEGRO Funes at bedside
RN, family and renal.
RN, family and renal.
RN, family and resident.
RN, family and resident.
Dr. Lea TELLES, Brooke NICHOLSON, Dr. Flores gen surg, RN Mary
Joie CONNER, Dr. Flores gen surg, RN Shantel
daughter in law/caretaker, Dr Andres ogden surg
NEGRO Jalloh
family at bedside, Dr. shirley TELLES, BHARAT Cox
Dr. Albarran renal, GI PA Kaye, RN Sara, Gen surg team and Dr. Flores gen surg, [facility] with patieent permission
no

## 2018-11-16 NOTE — PROGRESS NOTE ADULT - ATTENDING COMMENTS
I saw and examined the patient personally. Spoke with above provider regarding this case. I reviewed the above findings completely.  I agree with the above history, physical, and plan which I have edited where appropriate.
I saw and examined the patient personally. Spoke with above provider regarding this case. I reviewed the above findings completely.  I agree with the above history, physical, and plan which I have edited where appropriate.
Seen/examined. Agree with above.
Advanced care planning was discussed with patient and family.  Advanced care planning forms were reviewed and discussed.  Risks, benefits and alternatives of gastroenterologic procedures were discussed in detail and all questions were answered.    30 minutes spent.
Chart reviewed    Patient seen and examined    Agree with plan as outlined above
I saw and examined the patient personally. Spoke with above provider regarding this case. I reviewed the above findings completely.  I agree with the above history, physical, and plan which I have edited where appropriate.
The patient was personally seen and examined, in addition to being examined and evaluated by NP.  All elements of the note were edited where appropriate.
Seen/examined. Agree with above. Confused this morning.
Advanced care planning was discussed with patient and family.  Advanced care planning forms were reviewed and discussed.  Risks, benefits and alternatives of gastroenterologic procedures were discussed in detail and all questions were answered.    30 minutes spent.
Advanced care planning was discussed with patient and family.  Advanced care planning forms were reviewed and discussed.  Risks, benefits and alternatives of gastroenterologic procedures were discussed in detail and all questions were answered.    30 minutes spent.
68F PMH HTN, HLD, DM2, ESRD on dialysis, CAD s/p PCI (2007), and anemia of chronic kidney disease who presents with septic shock due to acute cholecystitis + ascending cholangitis, with ESBL E.coli bacteremia, fevers, transaminitis, cholestasis, lactic acidosis, leukocytosis, bandemia, and acute hypercapnic respiratory failure requiring bilevel PAP. Now s/p percutaneous cholecystostomy drain, post-op with new onset atrial fibrillation with RVR during dialysis 10/30, converted to NSR. Now improved    - Pain control with acetaminophen and hydromorphone, restart gabapentin 300 qd + extra 300 mg post-dialysis on dialysis days  - Resolved shock. Remains in NSR. New a-fib in the setting of acute illness, now resolved. Hold a/c  - Continue aspirin, clopidogrel, metoprolol, isosorbide mononitrate and nifedipine. Hold rosuvastatin given high AST/ALT  - Improved respiratory failure, doing well on room air currently. ABG with mild hypercapnia. Continue Bilevel PAP qhs and prn  - Advance diet to consistent carbohydrate DASH/TLC and monitor for nausea/vomiting. Start erythromycin for gastric motility. Bowel regimen  - S/p perc kevin drain 10/30. Monitor drain output, initial bile fluid cultures with ESBL E.coli + alpha hemolytic strep. Repeat bile fluid cultures negative. Blood cultures with ESBL E.coli, repeat cultures from 11/1 with NGTD. Continue Ertapenem  - ESRD, s/p dialysis yesterday, f/up renal  - DVT ppx with HSQ  - DM2, A1C 9.6, continue with insulin coverage scale. Restart insulin glargine when tolerating po  - No aguilar or lines  - Discussed with patient at bedside, full code  CC time spent: 35 min
68F PMH HTN, HLD, DM2, ESRD on dialysis, CAD s/p PCI (2007), and anemia of chronic kidney disease who presents with septic shock due to acute cholecystitis + ascending cholangitis, with ESBL E.coli bacteremia, fevers, transaminitis, cholestasis, lactic acidosis, leukocytosis, bandemia, and acute hypercapnic respiratory failure requiring bilevel PAP. Now s/p percutaneous cholecystostomy drain, post-op with new onset atrial fibrillation with RVR during dialysis 10/30, converted to NSR. Now improved    - Improved neurologic status. Pain control with acetaminophen and hydromorphone  - Septic shock resolved. Off pressors. Remains in NSR. Hold a/c at this time. If develops recurrent A-fib, may need a/c  - Continue aspirin and metoprolol. Restart home clopidogrel, isosorbide mononitrate and nifedipine. Hold rosuvastatin  - Improved respiratory failure, doing well on room air currently. Continue Bilevel PAP qhs and prn  - Start clears as tolerated. Continue metoclopramide. S/p perc kevin drain 10/30. Monitor drain output, gram stain from bile fluid with numerous gram negative rods. Re-check bile fluid cultures. Blood cultures with ESBL E.coli, repeat cultures positive, surveillance cultures pending. Change antibiotics to Ertapenem. D/c PPI. Bowel regimen  - ESRD, plan for dialysis today. F/up renal  - DVT ppx with HSQ  - DM2, A1C 9.6, hold lantus given relative hypoglycemia, continue insulin coverage scale for now, eventually restart lantus when diet resumed  - No aguilar or lines  - Discussed with patient and family at bedside, full code  CC time spent: 35 min
68F PMH HTN, HLD, DM2, ESRD on dialysis, CAD s/p PCI (2007), and anemia of chronic kidney disease who presents with septic shock due to acute cholecystitis + ascending cholangitis, with E.coli bacteremia, fevers, transaminitis, cholestasis, lactic acidosis, leukocytosis, bandemia, and acute hypercapnic respiratory failure requiring bilevel PAP. Now s/p percutaneous cholecystostomy drain, post-op with new onset atrial fibrillation with RVR during dialysis 10/30, converted to NSR.    - Improved neurologic status. Pain control with acetaminophen and hydromorphone  - Septic shock resolved. Off pressors. Now back in NSR. Hold a/c at this time. If develops recurrent A-fib, may need a/c. Change metoprolol to home dose of 50 mg bid. Hold imdur, nifedipine, and crestor at this time. Restart aspirin today, if tolerates will eventually restart plavix  - Improved respiratory failure, doing well with nasal cannula currently. Continue Bilevel PAP prn  - Keep NPO. Zofran/Reglan prn. S/p perc kevin drain 10/30. Monitor drain output, gram stain from bile fluid with numerous gram negative rods. Blood cultures with E.coli, repeat cultures positive, will check again in AM. Continue Zosyn. Continue PPI until tolerating po  - ESRD, s/p dialysis yesterday with fluid removal. F/up renal  - DVT ppx with HSQ  - DM2, A1C 9.6, hold lantus given relative hypoglycemia, continue insulin coverage scale for now, eventually restart lantus when diet resumed  - No lauren, d/c SABINE TLC  - Discussed with patient and family at bedside, full code  CC time spent: 35 min
68F PMH HTN, HLD, DM2, ESRD on dialysis, CAD s/p PCI (2007), and anemia of chronic kidney disease who presents with septic shock due to acute cholecystitis + ascending cholangitis, with E.coli bacteremia, fevers, transaminitis, cholestasis, lactic acidosis, leukocytosis, bandemia, and acute hypercapnic respiratory failure requiring bilevel PAP. Now s/p percutaneous cholecystostomy drain, post-op with new onset atrial fibrillation with RVR during dialysis.    - Improved neurologic status with pressors. Neuro checks. Avoid sedatives. Pain control as necessary  - Septic shock despite IVF. Started on norepinephrine, now switched to phenylephrine due to new-onset a-fib with RVR. Goal MAP>65. Hold imdur, nifedipine, and crestor at this time. If H/H remains stable, will restart asa/plavix in AM  - New a-fib with RVR. S/p amiodarone 150 mg IV, metoprolol 5 mg IV, and digoxin 0.5 mg IV x1. Started on IV metoprolol 5 mg IV q6h. +Troponin leak due to demand ischemia. F/up cards. Hold off on a/c at this time  - Continue BiPAP for mild acute hypercapnia, which is due to shock. Monitor respiratory status, may require intubation  - Keep NPO. S/p perc kevin drain 10/30. Monitor drain output and cultures from gallbladder fluid. Continue Zosyn. F/up repeat blood cultures. Zofran prn nausea. Continue PPI  - ESRD, s/p dialysis today, plan for UF 1-2 liters as tolerated. F/up renal  - DVT ppx with HSQ. May require a/c for new afib with underlying cardiac history  - DM2, A1C 9.6, continue with half dose lantus 12 units qhs + insulin coverage scale  - LIJ TLC placed today, no aguilar  - Discussed with patient and family at bedside, full code  CC time spent: 45 min
full code
full code
68 annamaria speaking obese F with PMH of CAD s/p stent (2007), ESRD on HD (MWF; missed today's session), DM type 2, and HTN presents with fever and admitted with severe sepsis secondary to acute choledocholithiasis, s/p perc cholecystostomy tube. Repeat CT suggestion of choledocholithiasis with a 5 mm stone suggested in the CBD, S/p ERCP on 11/14, stone unable to be removed.  D/C planning to [facility]. Plan: spoke at length with pt and family/daughter at bedside regarding plan for rehab vs home with palliative measures, family opts pt goes to rehab and try, pt somewhat agreeable, spoke at length with GI regarding current plan and recommended monitor lfts and clinically as outpatient they do not feel emergent transfer nor removal of gallbladder is clinically necessary at this time, pt stable for discharge per GI, apprec gen surg recs, dc planning after dialysis to rehab, apprec extensive SW collaboration as well as interdiscplinary team coordination, monitor course and dc to rehab tomorrow with close follow up with GI for ongoing care and management, daughter understands and verbalizes in addition and at request of patient interpretation to patient via family the benefits vs risks of all treatment options, plan of care and plan for follow up at this time
68 annamaria speaking obese F with PMH of CAD s/p stent (2007), ESRD on HD (MWF; missed today's session), DM type 2, and HTN presents with fever and admitted with severe sepsis secondary to acute choledocholithiasis, s/p perc cholecystostomy tube. Repeat CT suggestion of choledocholithiasis with a 5 mm stone suggested in the CBD, S/p ERCP on 11/14, stone unable to be removed. This AM patient with abdominal pain on palpation, and 1 episode of vomiting this AM, elevated WBC, liver enzymes trending up, elevated bilirubin, and elevated procal, given Invanz, for transfer to [facility] today.  Plan: dc 5-6pm today to [facility] for higher level of care, apprec GI collaboration in care and transfer, monito clinical course, ICU informed of patient and poss for rapid decompensation, spoke with daughter regarding risks vs benefits
68F with PMH of CAD s/p stent (2007), ESRD on HD (MWF; missed today's session), DM type 2, and HTN presents with fever and admitted with severe sepsis secondary to acute choledocholithiasis, s/p perc cholecystostomy tube. Is for dialysis today, and recheck of tube by IR. Plan: tube readjust for functionality, apprec gen surg and gi recs, dc planning to rehab poss tomorrow
68 annamaria speaking obese F with PMH of CAD s/p stent (2007), ESRD on HD (MWF; missed today's session), DM type 2, and HTN presents with fever and admitted with severe sepsis secondary to acute choledocholithiasis, s/p perc cholecystostomy tube. Repeat CT suggestion of choledocholithiasis with a 5 mm stone with continued cholecystitis suggested in the CBD, patient for ERCP tomorrow.  Plan: dialysis, ercp if no further interventions dc to rehab otherwise apprec further recs surg and GI, monitor clinical course, npo mno, medical optimization to follow, will coordinate with cardio for optimization as well, family at bedside and made aware as per pt request
full code
apprec collaborative team work regarding family and pt wishes for home vs rehab, monitor clinical course

## 2018-11-16 NOTE — PROGRESS NOTE ADULT - PROBLEM SELECTOR PLAN 3
likely multifactorial, sepsis, acute illness, renal insufficiency  hgb trend noted, low but stable  no overt s/s gib  trend h/h, transfuse prn  ppi qd  consider iron studies  if hgb continues to downtrend check fobt

## 2018-11-16 NOTE — PROGRESS NOTE ADULT - ASSESSMENT
68 female with a history of DM. PVD, HTN, CAD, CHF, ESRD on HD now admitted with gall bladder sepsis. Stable dialysis via right perma cath. she is now scheduled for transfer to [facility] for possible ERCP and possible cholecystectomy. Spoke to family at bed side in detail.

## 2018-11-16 NOTE — H&P ADULT - HISTORY OF PRESENT ILLNESS
68F with multiple medical issues including ESRD/HD, CAD/MI/stent, DM2,Acute cholecystitis causing sepsis, S/P Perc Noa 10/30 and ESBL E. coli bacteremia. she was treated with IV ertapenem. Also developed A fIb with RVR, improved now. ERCP was attempted at [facility] but was unsuccesful. Now transferred here for further management. At this time, she is A, A & O x3. Mild RUQ pain reported, with mild nausea. She has been NPO for the past few days.

## 2018-11-16 NOTE — H&P ADULT - PROBLEM SELECTOR PLAN 6
type 2 stable  -c/w lantus and low dose insulin sliding scale with hypoglycemic protocol  RISS  -HgA1c 9.7

## 2018-11-16 NOTE — PROGRESS NOTE ADULT - SUBJECTIVE AND OBJECTIVE BOX
Subjective: Pt c/o increased abdominal pain.    Objective:  Vital Signs Last 24 Hrs  T(C): 36.9 (16 Nov 2018 05:52), Max: 37.7 (16 Nov 2018 00:47)  T(F): 98.4 (16 Nov 2018 05:52), Max: 99.9 (16 Nov 2018 00:47)  HR: 85 (16 Nov 2018 05:52) (61 - 89)  BP: 149/77 (16 Nov 2018 05:52) (130/68 - 176/74)  BP(mean): --  RR: 18 (16 Nov 2018 05:52) (18 - 18)  SpO2: 92% (16 Nov 2018 05:52) (92% - 100%)    Heent: QUENTIN, FREOM  Pul: clear lucie decreased   Cor: RSR, without murmurs  Abdomen: normal bowel sounds, with upper abdominal distension, without guarding  Extremities: without edema, motor/sensory intact, without calf pain, Homans sign negative.                        8.6    21.92 )-----------( 343      ( 16 Nov 2018 08:46 )             28.3       11-16    136  |  95<L>  |  22  ----------------------------<  157<H>  4.4   |  30  |  4.50<H>    Ca    8.1<L>      16 Nov 2018 08:46    TPro  7.4  /  Alb  2.0<L>  /  TBili  2.2<H>  /  DBili  1.80<H>  /  AST  127<H>  /  ALT  50  /  AlkPhos  466<H>  11-16        11-15 @ 07:01  -  11-16 @ 07:00  --------------------------------------------------------  IN:  Total IN: 0 mL    OUT:    Other: 2000 mL    Post-Void Residual per Intermittent Catheterization: 400 mL  Total OUT: 2400 mL    Total NET: -2400 mL

## 2018-11-16 NOTE — PROGRESS NOTE ADULT - SUBJECTIVE AND OBJECTIVE BOX
INTERVAL HPI/OVERNIGHT EVENTS:  pt seen and examined earlier this am  per overnight rn pt  with abd pain and episode of non bloody vomiting, passed small bm overnight  pt lying in bed c/o abd pain  afebrile overnight labs noted    MEDICATIONS  (STANDING):  aspirin enteric coated 81 milliGRAM(s) Oral daily  atorvastatin 40 milliGRAM(s) Oral at bedtime  calcium carbonate 1250 mG  + Vitamin D (OsCal 500 + D) 1 Tablet(s) Oral daily  chlorhexidine 2% Cloths 1 Application(s) Topical daily  clopidogrel Tablet 75 milliGRAM(s) Oral daily  dextrose 5%. 1000 milliLiter(s) (50 mL/Hr) IV Continuous <Continuous>  dextrose 50% Injectable 12.5 Gram(s) IV Push once  dextrose 50% Injectable 25 Gram(s) IV Push once  dextrose 50% Injectable 25 Gram(s) IV Push once  docusate sodium 100 milliGRAM(s) Oral three times a day  epoetin analilia Injectable 71063 Unit(s) IV Push <User Schedule>  ertapenem  IVPB      ferrous    sulfate 325 milliGRAM(s) Oral three times a day  gabapentin 300 milliGRAM(s) Oral daily  heparin  Injectable 5000 Unit(s) SubCutaneous every 8 hours  insulin lispro (HumaLOG) corrective regimen sliding scale   SubCutaneous Before meals and at bedtime  isosorbide   mononitrate ER Tablet (IMDUR) 60 milliGRAM(s) Oral <User Schedule>  isosorbide   mononitrate ER Tablet (IMDUR) 30 milliGRAM(s) Oral every 24 hours  metoprolol tartrate 50 milliGRAM(s) Oral two times a day  NIFEdipine XL 90 milliGRAM(s) Oral daily  ondansetron Injectable 4 milliGRAM(s) IV Push once  pantoprazole    Tablet 40 milliGRAM(s) Oral before breakfast  polyethylene glycol 3350 17 Gram(s) Oral daily  senna 2 Tablet(s) Oral at bedtime  ursodiol Capsule 300 milliGRAM(s) Oral every 12 hours    MEDICATIONS  (PRN):  acetaminophen   Tablet .. 650 milliGRAM(s) Oral every 6 hours PRN Mild Pain (1 - 3)  dextrose 40% Gel 15 Gram(s) Oral once PRN Blood Glucose LESS THAN 70 milliGRAM(s)/deciliter  glucagon  Injectable 1 milliGRAM(s) IntraMuscular once PRN Glucose LESS THAN 70 milligrams/deciliter  simethicone 80 milliGRAM(s) Chew every 6 hours PRN abdominal bloating      Allergies    No Known Drug Allergies  Purell (Rash)    Intolerances        Review of Systems:    General:  No wt loss, fevers, chills, night sweats, fatigue   Eyes:  Good vision, no reported pain  ENT:  No sore throat, pain, runny nose, dysphagia  CV:  No pain, palpitations, hypo/hypertension  Resp:  No dyspnea, cough, tachypnea, wheezing  GI:  +pain, No nausea, +vomiting, No diarrhea, No constipation, No weight loss, No fever, No pruritis, No rectal bleeding, No melena, No dysphagia  :  No pain, bleeding, incontinence, nocturia  Muscle:  No pain, weakness  Neuro:  No weakness, tingling, memory problems  Psych:  No fatigue, insomnia, mood problems, depression  Endocrine:  No polyuria, polydypsia, cold/heat intolerance  Heme:  No petechiae, ecchymosis, easy bruisability  Skin:  No rash, tattoos, scars, edema      Vital Signs Last 24 Hrs  T(C): 36.9 (16 Nov 2018 05:52), Max: 37.7 (16 Nov 2018 00:47)  T(F): 98.4 (16 Nov 2018 05:52), Max: 99.9 (16 Nov 2018 00:47)  HR: 85 (16 Nov 2018 05:52) (66 - 89)  BP: 149/77 (16 Nov 2018 05:52) (130/68 - 176/74)  BP(mean): --  RR: 18 (16 Nov 2018 05:52) (18 - 18)  SpO2: 92% (16 Nov 2018 05:52) (92% - 100%)    PHYSICAL EXAM:    Constitutional: NAD, lying in bed  HEENT: ncat  Neck: No LAD  Respiratory: dec bs  Cardiovascular: S1 and S2, RRR  Gastrointestinal: obese abd soft generalized mild ttp +dt   Extremities: no edema  Vascular: 2+ peripheral pulses  Neurological: awake alert   Skin: No rashes      LABS:                        8.6    21.92 )-----------( 343      ( 16 Nov 2018 08:46 )             28.3     11-16    136  |  95<L>  |  22  ----------------------------<  157<H>  4.4   |  30  |  4.50<H>    Ca    8.1<L>      16 Nov 2018 08:46    TPro  7.4  /  Alb  2.0<L>  /  TBili  2.2<H>  /  DBili  1.80<H>  /  AST  127<H>  /  ALT  50  /  AlkPhos  466<H>  11-16          RADIOLOGY & ADDITIONAL TESTS:

## 2018-11-16 NOTE — PROGRESS NOTE ADULT - SUBJECTIVE AND OBJECTIVE BOX
infectious diseases progress note:    IRENE TAYLOR is a 68y y. o. Female patient    Patient reports: no feeling well      Allergies    No Known Drug Allergies  Purell (Rash)    Intolerances        ANTIBIOTICS/RELEVANT:  antimicrobials    immunologic:  epoetin analilia Injectable 97399 Unit(s) IV Push <User Schedule>    OTHER:  acetaminophen   Tablet .. 650 milliGRAM(s) Oral every 6 hours PRN  aspirin enteric coated 81 milliGRAM(s) Oral daily  atorvastatin 40 milliGRAM(s) Oral at bedtime  calcium carbonate 1250 mG  + Vitamin D (OsCal 500 + D) 1 Tablet(s) Oral daily  chlorhexidine 2% Cloths 1 Application(s) Topical daily  clopidogrel Tablet 75 milliGRAM(s) Oral daily  dextrose 40% Gel 15 Gram(s) Oral once PRN  dextrose 5%. 1000 milliLiter(s) IV Continuous <Continuous>  dextrose 50% Injectable 12.5 Gram(s) IV Push once  dextrose 50% Injectable 25 Gram(s) IV Push once  dextrose 50% Injectable 25 Gram(s) IV Push once  docusate sodium 100 milliGRAM(s) Oral three times a day  ferrous    sulfate 325 milliGRAM(s) Oral three times a day  gabapentin 300 milliGRAM(s) Oral daily  glucagon  Injectable 1 milliGRAM(s) IntraMuscular once PRN  heparin  Injectable 5000 Unit(s) SubCutaneous every 8 hours  insulin lispro (HumaLOG) corrective regimen sliding scale   SubCutaneous Before meals and at bedtime  isosorbide   mononitrate ER Tablet (IMDUR) 60 milliGRAM(s) Oral <User Schedule>  isosorbide   mononitrate ER Tablet (IMDUR) 30 milliGRAM(s) Oral every 24 hours  metoprolol tartrate 50 milliGRAM(s) Oral two times a day  NIFEdipine XL 90 milliGRAM(s) Oral daily  pantoprazole    Tablet 40 milliGRAM(s) Oral before breakfast  polyethylene glycol 3350 17 Gram(s) Oral daily  senna 2 Tablet(s) Oral at bedtime  simethicone 80 milliGRAM(s) Chew every 6 hours PRN  ursodiol Capsule 300 milliGRAM(s) Oral every 12 hours      Objective:  Last 24-Vital Signs Last 24 Hrs  T(C): 36.9 (16 Nov 2018 05:52), Max: 37.7 (16 Nov 2018 00:47)  T(F): 98.4 (16 Nov 2018 05:52), Max: 99.9 (16 Nov 2018 00:47)  HR: 85 (16 Nov 2018 05:52) (66 - 89)  BP: 149/77 (16 Nov 2018 05:52) (130/68 - 176/74)  BP(mean): --  RR: 18 (16 Nov 2018 05:52) (18 - 18)  SpO2: 92% (16 Nov 2018 05:52) (92% - 100%)    T(C): 36.9 (11-16-18 @ 05:52), Max: 37.7 (11-16-18 @ 00:47)  T(F): 98.4 (11-16-18 @ 05:52), Max: 99.9 (11-16-18 @ 00:47)  T(C): 36.9 (11-16-18 @ 05:52), Max: 37.7 (11-16-18 @ 00:47)  T(F): 98.4 (11-16-18 @ 05:52), Max: 99.9 (11-16-18 @ 00:47)  T(C): 36.9 (11-16-18 @ 05:52), Max: 37.7 (11-16-18 @ 00:47)  T(F): 98.4 (11-16-18 @ 05:52), Max: 99.9 (11-16-18 @ 00:47)    PHYSICAL EXAM:  Constitutional: Well-developed, well nourished, obviously uncomfortable  Eyes: PERRLA, EOMI  Ear/Nose/Throat: oropharynx normal	  Neck: no JVD, no lymphadenopathy, supple  Respiratory: no accessory muscle use, lung fields bilaterally decreased in bases  Cardiovascular: distant  Gastrointestinal: soft, diffusely tender but more tenderness in RUQ  Extremities: no clubbing, no cyanosis, edema absent  Skin: no sig lesions      LABS:                        8.6    21.92 )-----------( 343      ( 16 Nov 2018 08:46 )             28.3       WBC 21.92  11-16 @ 08:46  WBC 11.20  11-15 @ 08:30  WBC 10.34  11-14 @ 08:27  WBC 10.42  11-13 @ 07:46  WBC 12.15  11-12 @ 08:57  WBC 14.22  11-09 @ 10:53      11-16    136  |  95<L>  |  22  ----------------------------<  157<H>  4.4   |  30  |  4.50<H>    eGFR if Non : 9 (11.16.18 @ 08:46)        Ca    8.1<L>      16 Nov 2018 08:46    TPro  7.4  /  Alb  2.0<L>  /  TBili  2.2<H>  /  DBili  1.80<H>  /  AST  127<H>  /  ALT  50  /  AlkPhos  466<H>  11-16      Creatinine, Serum: 4.50 mg/dL (11-16-18 @ 08:46)  Creatinine, Serum: 3.70 mg/dL (11-15-18 @ 21:51)  Creatinine, Serum: 7.20 mg/dL (11-15-18 @ 08:30)  Creatinine, Serum: 6.00 mg/dL (11-14-18 @ 08:27)  Creatinine, Serum: 4.60 mg/dL (11-13-18 @ 07:46)  Creatinine, Serum: 7.70 mg/dL (11-12-18 @ 08:57)  Creatinine, Serum: 7.40 mg/dL (11-09-18 @ 10:53)        MICROBIOLOGY:              RADIOLOGY & ADDITIONAL STUDIES:

## 2018-11-16 NOTE — PROGRESS NOTE ADULT - ASSESSMENT
68F with multiple medical issues including ESRD/HD, CAD/MI/stent, DM2  Acute cholecystitis  S/P Perc Noa 10/30 and ESBL E. coli bacteremia  ertapenem 7 days past documented neg blood cultures so until 11/8  external biliary drain removed  ERCP unsuccessful 11/15  11/16 sig rise in WBC and clinical decline

## 2018-11-16 NOTE — PROGRESS NOTE ADULT - ASSESSMENT
68 annamaria speaking obese F with PMH of CAD s/p stent (2007), ESRD on HD (MWF; missed today's session), DM type 2, and HTN presents with fever and admitted with severe sepsis secondary to acute choledocholithiasis, s/p perc cholecystostomy tube. Repeat CT suggestion of choledocholithiasis with a 5 mm stone suggested in the CBD, S/p ERCP on 11/14, stone unable to be removed. This AM patient with abdominal pain on palpation, and 1 episode of vomiting this AM, elevated WBC, liver enzymes trending up, elevated bilirubin, and elevated procal, given Invanz, for transfer to [facility] today.

## 2018-11-16 NOTE — H&P ADULT - ASSESSMENT
68 annamaria speaking obese F with PMH of CAD s/p stent (2007), ESRD on HD (MWF; missed today's session), DM type 2, and HTN presents with fever and admitted with severe sepsis secondary to acute choledocholithiasis, s/p perc cholecystostomy tube. Repeat CT suggestion of choledocholithiasis with a 5 mm stone suggested in the CBD, S/p ERCP on 11/14, stone unable to be removed. This AM patient with abdominal pain on palpation, and 1 episode of vomiting this AM, elevated WBC, liver enzymes trending up, elevated bilirubin, and elevated procal, given Invanz, for transfer to [facility] today.

## 2018-11-17 DIAGNOSIS — N18.6 END STAGE RENAL DISEASE: ICD-10-CM

## 2018-11-17 RX ORDER — CHLORHEXIDINE GLUCONATE 213 G/1000ML
1 SOLUTION TOPICAL
Qty: 0 | Refills: 0 | Status: DISCONTINUED | OUTPATIENT
Start: 2018-11-17 | End: 2018-11-23

## 2018-11-17 RX ORDER — ERTAPENEM SODIUM 1 G/1
500 INJECTION, POWDER, LYOPHILIZED, FOR SOLUTION INTRAMUSCULAR; INTRAVENOUS EVERY 24 HOURS
Qty: 0 | Refills: 0 | Status: DISCONTINUED | OUTPATIENT
Start: 2018-11-17 | End: 2018-11-21

## 2018-11-17 RX ORDER — ERYTHROPOIETIN 10000 [IU]/ML
10000 INJECTION, SOLUTION INTRAVENOUS; SUBCUTANEOUS
Qty: 0 | Refills: 0 | Status: DISCONTINUED | OUTPATIENT
Start: 2018-11-17 | End: 2018-11-23

## 2018-11-17 RX ADMIN — Medication 1 TABLET(S): at 11:52

## 2018-11-17 RX ADMIN — INSULIN GLARGINE 24 UNIT(S): 100 INJECTION, SOLUTION SUBCUTANEOUS at 21:35

## 2018-11-17 RX ADMIN — GABAPENTIN 300 MILLIGRAM(S): 400 CAPSULE ORAL at 17:23

## 2018-11-17 RX ADMIN — CHLORHEXIDINE GLUCONATE 1 APPLICATION(S): 213 SOLUTION TOPICAL at 17:23

## 2018-11-17 RX ADMIN — Medication 100 MILLIGRAM(S): at 11:52

## 2018-11-17 RX ADMIN — Medication 2: at 17:23

## 2018-11-17 RX ADMIN — PANTOPRAZOLE SODIUM 40 MILLIGRAM(S): 20 TABLET, DELAYED RELEASE ORAL at 09:14

## 2018-11-17 RX ADMIN — Medication 60 MILLIGRAM(S): at 09:14

## 2018-11-17 RX ADMIN — ERYTHROPOIETIN 10000 UNIT(S): 10000 INJECTION, SOLUTION INTRAVENOUS; SUBCUTANEOUS at 21:34

## 2018-11-17 RX ADMIN — Medication 50 MILLIGRAM(S): at 09:14

## 2018-11-17 RX ADMIN — GABAPENTIN 300 MILLIGRAM(S): 400 CAPSULE ORAL at 09:14

## 2018-11-17 RX ADMIN — ISOSORBIDE MONONITRATE 30 MILLIGRAM(S): 60 TABLET, EXTENDED RELEASE ORAL at 11:52

## 2018-11-17 NOTE — PROGRESS NOTE ADULT - SUBJECTIVE AND OBJECTIVE BOX
[facility], Division of Infectious Diseases  DEB Barbosa A. Lee  [phone]  Name: IRENE TAYLOR  Age: 68y  Gender: Female  MRN: [id number]    Interval History--  Notes reviewed    Past Medical History--  ESRD (end stage renal disease) on dialysis  CAD (coronary artery disease)  Diabetes  CRF (chronic renal failure)  Hypertension  Pneumonia  Myocardial infarction  Hypertension  Diabetes  H/O: hysterectomy      For details regarding the patient's social history, family history, and other miscellaneous elements, please refer the initial infectious diseases consultation and/or the admitting history and physical examination for this admission.    Allergies    No Known Drug Allergies  Purell (Rash)    Intolerances        Medications--  Antibiotics:  ertapenem  IVPB 500 milliGRAM(s) IV Intermittent every 24 hours    Immunologic:    Other:  acetaminophen   Tablet .. PRN  atorvastatin  calcium carbonate 1250 mG  + Vitamin D (OsCal 500 + D)  dextrose 40% Gel PRN  dextrose 5%.  dextrose 50% Injectable  docusate sodium  gabapentin  glucagon  Injectable PRN  insulin glargine Injectable (LANTUS)  insulin lispro (HumaLOG) corrective regimen sliding scale  isosorbide   mononitrate ER Tablet (IMDUR)  metoprolol tartrate  NIFEdipine XL  pantoprazole    Tablet  senna      Review of Systems--  A 10-point review of systems was obtained.     Pertinent positives and negatives--  Constitutional: No fevers. No Chills. No Rigors.   Cardiovascular: No chest pain. No palpitations.  Respiratory: No shortness of breath. No cough.  Gastrointestinal: No nausea or vomiting. No diarrhea or constipation.   Psychiatric: Pleasant. Appropriate affect.    Review of systems otherwise negative except as previously noted.    Physical Examination--  Vital Signs: T(F): 98.2 (11-17-18 @ 09:12), Max: 99 (11-16-18 @ 12:44)  HR: 72 (11-17-18 @ 09:12)  BP: 111/44 (11-17-18 @ 09:12)  RR: 16 (11-17-18 @ 09:12)  SpO2: 92% (11-17-18 @ 09:12)  Wt(kg): --  General: Nontoxic-appearing Female in no acute distress.  HEENT: AT/NC. PERRL. EOMI. Anicteric. Conjunctiva pink and moist. Oropharynx clear. Dentition fair.  Neck: Not rigid. No sense of mass.  Nodes: None palpable.  Lungs: Clear bilaterally without rales, wheezing or rhonchi  Heart: Regular rate and rhythm. No Murmur. No rub. No gallop. No palpable thrill.  Abdomen: Bowel sounds present and normoactive. Soft. Nondistended. Nontender. No sense of mass. No organomegaly.  Back: No spinal tenderness. No costovertebral angle tenderness.   Extremities: No cyanosis or clubbing. No edema.   Skin: Warm. Dry. Good turgor. No rash. No vasculitic stigmata.  Psychiatric: Appropriate affect and mood for situation.         Laboratory Studies--  CBC                        8.6    21.92 )-----------( 343      ( 16 Nov 2018 08:46 )             28.3       Chemistries  11-16    136  |  95<L>  |  22  ----------------------------<  157<H>  4.4   |  30  |  4.50<H>    Ca    8.1<L>      16 Nov 2018 08:46    TPro  7.4  /  Alb  2.0<L>  /  TBili  2.2<H>  /  DBili  1.80<H>  /  AST  127<H>  /  ALT  50  /  AlkPhos  466<H>  11-16      Culture Data [facility], Division of Infectious Diseases  DEB Barbosa A. Lee  [phone]  Name: IRENE TAYLOR  Age: 68y  Gender: Female  MRN: [id number]    Interval History--  Notes reviewed  pt known to service from [facility]  s/p ERCP transferred for repeat ERCP  history in April with family at bedside-- pt states ruq pain no vomiting,      Past Medical History--  ESRD (end stage renal disease) on dialysis  CAD (coronary artery disease)  Diabetes  CRF (chronic renal failure)  Hypertension  Pneumonia  Myocardial infarction  Hypertension  Diabetes  H/O: hysterectomy      For details regarding the patient's social history, family history, and other miscellaneous elements, please refer the initial infectious diseases consultation and/or the admitting history and physical examination for this admission.    Allergies    No Known Drug Allergies  Purell (Rash)    Intolerances        Medications--  Antibiotics:  ertapenem  IVPB 500 milliGRAM(s) IV Intermittent every 24 hours    Immunologic:    Other:  acetaminophen   Tablet .. PRN  atorvastatin  calcium carbonate 1250 mG  + Vitamin D (OsCal 500 + D)  dextrose 40% Gel PRN  dextrose 5%.  dextrose 50% Injectable  docusate sodium  gabapentin  glucagon  Injectable PRN  insulin glargine Injectable (LANTUS)  insulin lispro (HumaLOG) corrective regimen sliding scale  isosorbide   mononitrate ER Tablet (IMDUR)  metoprolol tartrate  NIFEdipine XL  pantoprazole    Tablet  senna      Review of Systems--  A 10-point review of systems was obtained.     Pertinent positives and negatives--  Constitutional: No fevers. No Chills. No Rigors.   Cardiovascular: No chest pain. No palpitations.  Respiratory: No shortness of breath. No cough.  Gastrointestinal: No nausea or vomiting. No diarrhea or constipation.   Psychiatric: no anxiety    Review of systems otherwise negative except as previously noted.    Physical Examination--  Vital Signs: T(F): 98.2 (11-17-18 @ 09:12), Max: 99 (11-16-18 @ 12:44)  HR: 72 (11-17-18 @ 09:12)  BP: 111/44 (11-17-18 @ 09:12)  RR: 16 (11-17-18 @ 09:12)  SpO2: 92% (11-17-18 @ 09:12)  Wt(kg): --  General: Nontoxic-appearing Female in no acute distress.  HEENT: AT/NC.Anicteric. Conjunctiva pink and moist.  Neck: Not rigid. No sense of mass.  Nodes: None palpable.  Lungs: Clear bilaterally without rales, wheezing or rhonchi right shiley cath  Heart: Regular rate and rhythm. No Murmur. No rub.   Abdomen: Bowel sounds present and normoactive. Soft. Nondistended.  tender ruq to deep palpation  Extremities: No cyanosis or clubbing. +edema.   Skin: Warm. Dry. Good turgor. No rash. No vasculitic stigmata.  Psychiatric: Appropriate affect and mood for situation.         Laboratory Studies--  CBC                        8.6    21.92 )-----------( 343      ( 16 Nov 2018 08:46 )             28.3       Chemistries  11-16    136  |  95<L>  |  22  ----------------------------<  157<H>  4.4   |  30  |  4.50<H>    Ca    8.1<L>      16 Nov 2018 08:46    TPro  7.4  /  Alb  2.0<L>  /  TBili  2.2<H>  /  DBili  1.80<H>  /  AST  127<H>  /  ALT  50  /  AlkPhos  466<H>  11-16      Culture Data  < from: Xray Chest 1 View- PORTABLE-Urgent (11.15.18 @ 20:12) >  EXAM:  XR CHEST PORTABLE URGENT 1V                            PROCEDURE DATE:  11/15/2018          INTERPRETATION:  History: Shortness of breath and cough    Portable radiograph of the chest is performed and compared to 10/31/2018.    The heart size is borderline. There is mild pulmonary vascular   congestion. Right-sided central venous catheter is seen with the tip   overlying the right atrium. There is osteopenia and degenerative change   of bony structures. Previously seen NG tube is no longer present.    Impression: Pulmonary vascular congestion.  Right-sided dialysis catheter.    < end of copied text >

## 2018-11-17 NOTE — CONSULT NOTE ADULT - SUBJECTIVE AND OBJECTIVE BOX
[facility] - Rosenda / Umer LAL /Cale/ LUKASZ Mosher/ LUKASZ Flanagan/ Ed Vogt / JERRY Njeru  ---------------------------------------------------------------------------------------------------------------  Patient seen and examined bedside    PAST MEDICAL & SURGICAL HISTORY:  ESRD (end stage renal disease) on dialysis: F  CAD (coronary artery disease): s/p stent in 2007  Diabetes  Myocardial infarction  Hypertension  H/O: hysterectomy      Allergies: No Known Drug Allergies  Purell (Rash)    Home Medications Reviewed  Hospital Medications:   MEDICATIONS  (STANDING):  atorvastatin 80 milliGRAM(s) Oral at bedtime  calcium carbonate 1250 mG  + Vitamin D (OsCal 500 + D) 1 Tablet(s) Oral daily  chlorhexidine 4% Liquid 1 Application(s) Topical two times a day  dextrose 5%. 1000 milliLiter(s) (50 mL/Hr) IV Continuous <Continuous>  dextrose 50% Injectable 12.5 Gram(s) IV Push once  docusate sodium 100 milliGRAM(s) Oral daily  epoetin analilia Injectable 92410 Unit(s) IV Push <User Schedule>  ertapenem  IVPB 500 milliGRAM(s) IV Intermittent every 24 hours  gabapentin 300 milliGRAM(s) Oral two times a day  insulin glargine Injectable (LANTUS) 24 Unit(s) SubCutaneous at bedtime  insulin lispro (HumaLOG) corrective regimen sliding scale   SubCutaneous three times a day before meals  isosorbide   mononitrate ER Tablet (IMDUR) 30 milliGRAM(s) Oral daily  metoprolol tartrate 50 milliGRAM(s) Oral two times a day  NIFEdipine XL 60 milliGRAM(s) Oral daily  pantoprazole    Tablet 40 milliGRAM(s) Oral before breakfast  senna 2 Tablet(s) Oral at bedtime    SOCIAL HISTORY:  Denies ETOh,Smoking, illicit drug use  FAMILY HISTORY:  No pertinent family history in first degree relatives      REVIEW OF SYSTEMS:  CONSTITUTIONAL: No weakness, fevers or chills  EYES/ENT: No visual changes;  No vertigo or throat pain   NECK: No pain or stiffness  RESPIRATORY: No cough, wheezing, hemoptysis; No shortness of breath  CARDIOVASCULAR: No chest pain or palpitations.  GASTROINTESTINAL: No abdominal or epigastric pain. No nausea, vomiting, or hematemesis; No diarrhea or constipation. No melena or hematochezia.  NEUROLOGICAL: No numbness or weakness  SKIN: No itching, burning, rashes, or lesions   VASCULAR: No bilateral lower extremity edema.   All other review of systems is negative unless indicated above.    VITALS:  T(F): 98.9 (11-17-18 @ 11:51), Max: 98.9 (11-17-18 @ 11:51)  HR: 65 (11-17-18 @ 11:51)  BP: 124/55 (11-17-18 @ 11:51)  RR: 16 (11-17-18 @ 11:51)  SpO2: 99% (11-17-18 @ 11:51)  Wt(kg): --    Height (cm): 162.56 (11-16 @ 18:55)  Weight (kg): 98 (11-16 @ 18:55)  BMI (kg/m2): 37.1 (11-16 @ 18:55)  BSA (m2): 2.02 (11-16 @ 18:55)    PHYSICAL EXAM:  Constitutional: NAD, obese  HEENT: anicteric sclera, oropharynx clear, MMM  Neck: No JVD  Respiratory: CTAB, no wheezes, rales or rhonchi  Cardiovascular: S1, S2, RRR  Gastrointestinal: BS+, soft, NT/ND  Extremities: No cyanosis or clubbing. No peripheral edema  Neurological: A/O x 3, no focal deficits  Psychiatric: Normal mood, normal affect  : No CVA tenderness. No aguilar.   Skin: No rashes  Vascular Access: right IJ PC    LABS:  11-16    136  |  95<L>  |  22  ----------------------------<  157<H>  4.4   |  30  |  4.50<H>    Ca    8.1<L>      16 Nov 2018 08:46    TPro  7.4  /  Alb  2.0<L>  /  TBili  2.2<H>  /  DBili  1.80<H>  /  AST  127<H>  /  ALT  50  /  AlkPhos  466<H>  11-16    Creatinine Trend: 4.50 <--, 3.70 <--, 7.20 <--, 6.00 <--, 4.60 <--, 7.70 <--                        8.6    21.92 )-----------( 343      ( 16 Nov 2018 08:46 )             28.3     Urine Studies:        RADIOLOGY & ADDITIONAL STUDIES: [facility] - Rosenda / Umer S /Cale/ S Vidhi/ LUKASZ Flanagan/ Ed Vogt / JERRY Njeru  ---------------------------------------------------------------------------------------------------------------  Patient seen and examined bedside    68F with h/o ESRD/HD, CAD/MI/stent, DM2, Acute cholecystitis causing sepsis, S/P Perc Noa 10/30 and ESBL E. coli bacteremia. she was treated with IV ertapenem. Also developed A fIb with RVR, improved now. ERCP was attempted at [facility] but was unsuccesful. Now transferred to [facility] for further management. Renal consulted for HD. Pt gets outpt HD at [facility] in [place]. Pt had her last HD at [facility] 11/15, due for HD today. She has been NPO for the past few days. Pt currently denies any sob/cp. reports abd pain controlled now.     PAST MEDICAL & SURGICAL HISTORY:  ESRD (end stage renal disease) on dialysis: MWF  CAD (coronary artery disease): s/p stent in 2007  Diabetes  Myocardial infarction  Hypertension  H/O: hysterectomy      Allergies: No Known Drug Allergies  Purell (Rash)    Home Medications Reviewed  Hospital Medications:   MEDICATIONS  (STANDING):  atorvastatin 80 milliGRAM(s) Oral at bedtime  calcium carbonate 1250 mG  + Vitamin D (OsCal 500 + D) 1 Tablet(s) Oral daily  chlorhexidine 4% Liquid 1 Application(s) Topical two times a day  dextrose 5%. 1000 milliLiter(s) (50 mL/Hr) IV Continuous <Continuous>  dextrose 50% Injectable 12.5 Gram(s) IV Push once  docusate sodium 100 milliGRAM(s) Oral daily  epoetin analilia Injectable 53761 Unit(s) IV Push <User Schedule>  ertapenem  IVPB 500 milliGRAM(s) IV Intermittent every 24 hours  gabapentin 300 milliGRAM(s) Oral two times a day  insulin glargine Injectable (LANTUS) 24 Unit(s) SubCutaneous at bedtime  insulin lispro (HumaLOG) corrective regimen sliding scale   SubCutaneous three times a day before meals  isosorbide   mononitrate ER Tablet (IMDUR) 30 milliGRAM(s) Oral daily  metoprolol tartrate 50 milliGRAM(s) Oral two times a day  NIFEdipine XL 60 milliGRAM(s) Oral daily  pantoprazole    Tablet 40 milliGRAM(s) Oral before breakfast  senna 2 Tablet(s) Oral at bedtime    SOCIAL HISTORY:  Denies ETOh,Smoking, illicit drug use  FAMILY HISTORY:  No pertinent family history in first degree relatives      REVIEW OF SYSTEMS:  CONSTITUTIONAL: No weakness, fevers or chills  EYES/ENT: No visual changes;  No vertigo or throat pain   NECK: No pain or stiffness  RESPIRATORY: No cough, wheezing, hemoptysis; No shortness of breath  CARDIOVASCULAR: No chest pain or palpitations.  GASTROINTESTINAL: RUQ abdominal pain betetr now. +nausea, no vomiting, or hematemesis; No diarrhea or constipation. No melena or hematochezia.  NEUROLOGICAL: No numbness or weakness  SKIN: No itching, burning, rashes, or lesions   VASCULAR: No bilateral lower extremity edema.   All other review of systems is negative unless indicated above.    VITALS:  T(F): 98.9 (11-17-18 @ 11:51), Max: 98.9 (11-17-18 @ 11:51)  HR: 65 (11-17-18 @ 11:51)  BP: 124/55 (11-17-18 @ 11:51)  RR: 16 (11-17-18 @ 11:51)  SpO2: 99% (11-17-18 @ 11:51)  Wt(kg): --    Height (cm): 162.56 (11-16 @ 18:55)  Weight (kg): 98 (11-16 @ 18:55)  BMI (kg/m2): 37.1 (11-16 @ 18:55)  BSA (m2): 2.02 (11-16 @ 18:55)    PHYSICAL EXAM:  Constitutional: NAD, obese  HEENT: icteric sclera +, oropharynx clear, MMM  Neck: No JVD  Respiratory: CTAB, no wheezes, rales or rhonchi  Cardiovascular: S1, S2, RRR  Gastrointestinal: BS+, soft, obese  Extremities: No cyanosis or clubbing. No peripheral edema  Neurological: A/O x 3, no focal deficits  Psychiatric: Normal mood, normal affect  : No CVA tenderness. No aguilar.   Skin: No rashes  Vascular Access: right IJ PC, no redness/tenderness over the tunnel    LABS:  11-16    136  |  95<L>  |  22  ----------------------------<  157<H>  4.4   |  30  |  4.50<H>    Ca    8.1<L>      16 Nov 2018 08:46    TPro  7.4  /  Alb  2.0<L>  /  TBili  2.2<H>  /  DBili  1.80<H>  /  AST  127<H>  /  ALT  50  /  AlkPhos  466<H>  11-16    Creatinine Trend: 4.50 <--, 3.70 <--, 7.20 <--, 6.00 <--, 4.60 <--, 7.70 <--                        8.6    21.92 )-----------( 343      ( 16 Nov 2018 08:46 )             28.3     Urine Studies:        RADIOLOGY & ADDITIONAL STUDIES:  < from: Xray Chest 1 View- PORTABLE-Urgent (11.15.18 @ 20:12) >    Impression: Pulmonary vascular congestion.  Right-sided dialysis catheter.        < from: CT Abdomen No Cont (11.12.18 @ 15:42) >  IMPRESSION:    Cholecystostomy tube in the right upper quadrant appears outside the   gallbladder consistent with malpositioning of the tube. There is again   evidence of cholelithiasis and inflammatory change surrounding the   gallbladder consistent with cholecystitis. There is suggestion of   choledocholithiasis with a 5 mm stone suggested in the CBD on coronal   image 57 although this was not corroborated on the recent MRCP.  Small left renal cyst  Results were discussed with the referring physician, Dr. Marek Strickland, at 6:40 PM on November 12, 2018. By this time, the tube had   been removed by the interventional radiologist.

## 2018-11-17 NOTE — CONSULT NOTE ADULT - ASSESSMENT
ESRD on HD   s/p last HD at [facility] 11/15    maintainance HD arranged for today w/2k bath, uf 1-1.5kg as tolerated  epogen 10k tiw w/hd  renal restrictions in diet  f/u w/GI for jaundice w/u  will f/u 68 obese F with PMH of CAD s/p stent (2007), ESRD on HD (MWF outpt), DM type 2, and HTN presented to  hospital with fever and admitted with severe sepsis secondary to acute choledocholithiasis, s/p perc cholecystostomy tube. Repeat CT suggestion of choledocholithiasis with a 5 mm stone suggested in the CBD, S/p ERCP on 11/14, stone unable to be removed. Yesterday patient developed abdominal pain on palpation, and 1 episode of vomiting, elevated WBC, liver enzymes trending up, elevated bilirubin, and elevated procal, given Invanz, for transferred to [facility]. Renal consulted for ESRD management/HD     ESRD on HD K 11/16 acceptable   Pulmonary vascular congestion on CXR from 11/15. currently clinically appers euvolemic   s/p last HD at  11/15 per pt and grand son  choledocholithiasis w/Jaundice   HTN, controlled. on BB, CCB and Imdur  Anemia in CKD  DM-Mx per medicine    labs from 11/16, rad, chart reviewed  Informed consent for HD obtained from pt  no indication for urgent HD at this time  maintainance HD arranged for today w/2k bath, uf 1.5kg as tolerated  added epogen 10k tiw w/hd  renal restrictions in diet when resumed  f/u w/GI     Thanks for consulting. will f/u closely

## 2018-11-17 NOTE — PROGRESS NOTE ADULT - SUBJECTIVE AND OBJECTIVE BOX
Patient is a 68y old  Female who presents with a chief complaint of Transfer for higher level of care (17 Nov 2018 14:02)      SUBJECTIVE / OVERNIGHT EVENTS:  Afebrile, no nausea or vomiting today.  Review of Systems:   CONSTITUTIONAL: No fever, weight loss, or fatigue  EYES: No eye pain, visual disturbances, or discharge  ENMT:  No difficulty hearing, tinnitus, vertigo; No sinus or throat pain  NECK: No pain or stiffness  BREASTS: No pain, masses, or nipple discharge  RESPIRATORY: No cough, wheezing, chills or hemoptysis; No shortness of breath  CARDIOVASCULAR: No chest pain, palpitations, dizziness, or leg swelling  GASTROINTESTINAL: Mild RUQ pain. No nausea, vomiting, or hematemesis; No diarrhea or constipation. No melena or hematochezia.  GENITOURINARY: No dysuria, frequency, hematuria, or incontinence  NEUROLOGICAL: No headaches, memory loss, loss of strength, numbness, or tremors  SKIN: No itching, burning, rashes, or lesions   LYMPH NODES: No enlarged glands  ENDOCRINE: No heat or cold intolerance; No hair loss  MUSCULOSKELETAL: No joint pain or swelling; No muscle, back, or extremity pain  PSYCHIATRIC: No depression, anxiety, mood swings, or difficulty sleeping  HEME/LYMPH: No easy bruising, or bleeding gums  ALLERY AND IMMUNOLOGIC: No hives or eczema    MEDICATIONS  (STANDING):  atorvastatin 80 milliGRAM(s) Oral at bedtime  calcium carbonate 1250 mG  + Vitamin D (OsCal 500 + D) 1 Tablet(s) Oral daily  chlorhexidine 4% Liquid 1 Application(s) Topical two times a day  dextrose 5%. 1000 milliLiter(s) (50 mL/Hr) IV Continuous <Continuous>  dextrose 50% Injectable 12.5 Gram(s) IV Push once  docusate sodium 100 milliGRAM(s) Oral daily  epoetin analilia Injectable 77096 Unit(s) IV Push <User Schedule>  ertapenem  IVPB 500 milliGRAM(s) IV Intermittent every 24 hours  gabapentin 300 milliGRAM(s) Oral two times a day  insulin glargine Injectable (LANTUS) 24 Unit(s) SubCutaneous at bedtime  insulin lispro (HumaLOG) corrective regimen sliding scale   SubCutaneous three times a day before meals  isosorbide   mononitrate ER Tablet (IMDUR) 30 milliGRAM(s) Oral daily  metoprolol tartrate 50 milliGRAM(s) Oral two times a day  NIFEdipine XL 60 milliGRAM(s) Oral daily  pantoprazole    Tablet 40 milliGRAM(s) Oral before breakfast  senna 2 Tablet(s) Oral at bedtime    MEDICATIONS  (PRN):  acetaminophen   Tablet .. 650 milliGRAM(s) Oral every 6 hours PRN Temp greater or equal to 38C (100.4F)  dextrose 40% Gel 15 Gram(s) Oral once PRN Blood Glucose LESS THAN 70 milliGRAM(s)/deciliter  glucagon  Injectable 1 milliGRAM(s) IntraMuscular once PRN Glucose LESS THAN 70 milligrams/deciliter      PHYSICAL EXAM:  Vital Signs Last 24 Hrs  T(C): 37.2 (17 Nov 2018 11:51), Max: 37.2 (17 Nov 2018 11:51)  T(F): 98.9 (17 Nov 2018 11:51), Max: 98.9 (17 Nov 2018 11:51)  HR: 65 (17 Nov 2018 11:51) (65 - 77)  BP: 124/55 (17 Nov 2018 11:51) (102/46 - 149/73)  BP(mean): --  RR: 16 (17 Nov 2018 11:51) (16 - 18)  SpO2: 99% (17 Nov 2018 11:51) (92% - 99%)  I&O's Summary    GENERAL: NAD, well-developed  HEAD:  Atraumatic, Normocephalic  EYES: EOMI, PERRLA, conjunctiva and sclera clear  NECK: Supple, No JVD  CHEST/LUNG: Clear to auscultation bilaterally; No wheeze  HEART: Regular rate and rhythm; No murmurs, rubs, or gallops  ABDOMEN: Soft, mild RUQ tenderness, no rebound, Nondistended; Bowel sounds present  EXTREMITIES:  2+ Peripheral Pulses, No clubbing, cyanosis, or edema  PSYCH: AAOx3  NEUROLOGY: non-focal  SKIN: No rashes or lesions    LABS:  CAPILLARY BLOOD GLUCOSE      POCT Blood Glucose.: 172 mg/dL (17 Nov 2018 16:37)  POCT Blood Glucose.: 134 mg/dL (17 Nov 2018 12:00)  POCT Blood Glucose.: 107 mg/dL (17 Nov 2018 08:30)  POCT Blood Glucose.: 225 mg/dL (16 Nov 2018 23:35)  POCT Blood Glucose.: 142 mg/dL (16 Nov 2018 17:13)                          8.6    21.92 )-----------( 343      ( 16 Nov 2018 08:46 )             28.3     11-16    136  |  95<L>  |  22  ----------------------------<  157<H>  4.4   |  30  |  4.50<H>    Ca    8.1<L>      16 Nov 2018 08:46    TPro  7.4  /  Alb  2.0<L>  /  TBili  2.2<H>  /  DBili  1.80<H>  /  AST  127<H>  /  ALT  50  /  AlkPhos  466<H>  11-16              RADIOLOGY & ADDITIONAL TESTS:    Imaging Personally Reviewed:    Consultant(s) Notes Reviewed:      Care Discussed with Consultants/Other Providers:

## 2018-11-17 NOTE — PROGRESS NOTE ADULT - SUBJECTIVE AND OBJECTIVE BOX
INTERVAL HPI/OVERNIGHT EVENTS:  pt seen and examined earlier this am  pt lying in bed c/o abd pain  afebrile overnight labs noted    MEDICATIONS  (STANDING):  aspirin enteric coated 81 milliGRAM(s) Oral daily  atorvastatin 40 milliGRAM(s) Oral at bedtime  calcium carbonate 1250 mG  + Vitamin D (OsCal 500 + D) 1 Tablet(s) Oral daily  chlorhexidine 2% Cloths 1 Application(s) Topical daily  clopidogrel Tablet 75 milliGRAM(s) Oral daily  dextrose 5%. 1000 milliLiter(s) (50 mL/Hr) IV Continuous <Continuous>  dextrose 50% Injectable 12.5 Gram(s) IV Push once  dextrose 50% Injectable 25 Gram(s) IV Push once  dextrose 50% Injectable 25 Gram(s) IV Push once  docusate sodium 100 milliGRAM(s) Oral three times a day  epoetin analilia Injectable 96940 Unit(s) IV Push <User Schedule>  ertapenem  IVPB      ferrous    sulfate 325 milliGRAM(s) Oral three times a day  gabapentin 300 milliGRAM(s) Oral daily  heparin  Injectable 5000 Unit(s) SubCutaneous every 8 hours  insulin lispro (HumaLOG) corrective regimen sliding scale   SubCutaneous Before meals and at bedtime  isosorbide   mononitrate ER Tablet (IMDUR) 60 milliGRAM(s) Oral <User Schedule>  isosorbide   mononitrate ER Tablet (IMDUR) 30 milliGRAM(s) Oral every 24 hours  metoprolol tartrate 50 milliGRAM(s) Oral two times a day  NIFEdipine XL 90 milliGRAM(s) Oral daily  ondansetron Injectable 4 milliGRAM(s) IV Push once  pantoprazole    Tablet 40 milliGRAM(s) Oral before breakfast  polyethylene glycol 3350 17 Gram(s) Oral daily  senna 2 Tablet(s) Oral at bedtime  ursodiol Capsule 300 milliGRAM(s) Oral every 12 hours    MEDICATIONS  (PRN):  acetaminophen   Tablet .. 650 milliGRAM(s) Oral every 6 hours PRN Mild Pain (1 - 3)  dextrose 40% Gel 15 Gram(s) Oral once PRN Blood Glucose LESS THAN 70 milliGRAM(s)/deciliter  glucagon  Injectable 1 milliGRAM(s) IntraMuscular once PRN Glucose LESS THAN 70 milligrams/deciliter  simethicone 80 milliGRAM(s) Chew every 6 hours PRN abdominal bloating      Allergies    No Known Drug Allergies  Purell (Rash)    Intolerances        Review of Systems:    General:  No wt loss, fevers, chills, night sweats, fatigue   Eyes:  Good vision, no reported pain  ENT:  No sore throat, pain, runny nose, dysphagia  CV:  No pain, palpitations, hypo/hypertension  Resp:  No dyspnea, cough, tachypnea, wheezing  GI:  +pain, No nausea, +vomiting, No diarrhea, No constipation, No weight loss, No fever, No pruritis, No rectal bleeding, No melena, No dysphagia  :  No pain, bleeding, incontinence, nocturia  Muscle:  No pain, weakness  Neuro:  No weakness, tingling, memory problems  Psych:  No fatigue, insomnia, mood problems, depression  Endocrine:  No polyuria, polydypsia, cold/heat intolerance  Heme:  No petechiae, ecchymosis, easy bruisability  Skin:  No rash, tattoos, scars, edema      Vital Signs Last 24 Hrs  T(C): 36.9 (16 Nov 2018 05:52), Max: 37.7 (16 Nov 2018 00:47)  T(F): 98.4 (16 Nov 2018 05:52), Max: 99.9 (16 Nov 2018 00:47)  HR: 85 (16 Nov 2018 05:52) (66 - 89)  BP: 149/77 (16 Nov 2018 05:52) (130/68 - 176/74)  BP(mean): --  RR: 18 (16 Nov 2018 05:52) (18 - 18)  SpO2: 92% (16 Nov 2018 05:52) (92% - 100%)    PHYSICAL EXAM:    Constitutional: NAD, lying in bed  HEENT: ncat  Neck: No LAD  Respiratory: dec bs  Cardiovascular: S1 and S2, RRR  Gastrointestinal: obese abd soft generalized mild ttp +dt   Extremities: no edema  Vascular: 2+ peripheral pulses  Neurological: awake alert   Skin: No rashes      LABS:                        8.6    21.92 )-----------( 343      ( 16 Nov 2018 08:46 )             28.3     11-16    136  |  95<L>  |  22  ----------------------------<  157<H>  4.4   |  30  |  4.50<H>    Ca    8.1<L>      16 Nov 2018 08:46    TPro  7.4  /  Alb  2.0<L>  /  TBili  2.2<H>  /  DBili  1.80<H>  /  AST  127<H>  /  ALT  50  /  AlkPhos  466<H>  11-16          RADIOLOGY & ADDITIONAL TESTS:

## 2018-11-17 NOTE — PROGRESS NOTE ADULT - PROBLEM SELECTOR PLAN 2
cont ertapenem for now  f/u cbc and temp curve cont ertapenem for now  f/u cbc and temp curve  repeat blood cx

## 2018-11-18 DIAGNOSIS — I10 ESSENTIAL (PRIMARY) HYPERTENSION: ICD-10-CM

## 2018-11-18 DIAGNOSIS — E11.9 TYPE 2 DIABETES MELLITUS WITHOUT COMPLICATIONS: ICD-10-CM

## 2018-11-18 DIAGNOSIS — R17 UNSPECIFIED JAUNDICE: ICD-10-CM

## 2018-11-18 LAB
ALBUMIN SERPL ELPH-MCNC: 2.6 G/DL — LOW (ref 3.3–5)
ALP SERPL-CCNC: 367 U/L — HIGH (ref 40–120)
ALT FLD-CCNC: 40 U/L — HIGH (ref 4–33)
AST SERPL-CCNC: 61 U/L — HIGH (ref 4–32)
BILIRUB SERPL-MCNC: 1 MG/DL — SIGNIFICANT CHANGE UP (ref 0.2–1.2)
BUN SERPL-MCNC: 12 MG/DL — SIGNIFICANT CHANGE UP (ref 7–23)
CALCIUM SERPL-MCNC: 8.7 MG/DL — SIGNIFICANT CHANGE UP (ref 8.4–10.5)
CHLORIDE SERPL-SCNC: 95 MMOL/L — LOW (ref 98–107)
CO2 SERPL-SCNC: 28 MMOL/L — SIGNIFICANT CHANGE UP (ref 22–31)
CREAT SERPL-MCNC: 2.98 MG/DL — HIGH (ref 0.5–1.3)
GLUCOSE SERPL-MCNC: 90 MG/DL — SIGNIFICANT CHANGE UP (ref 70–99)
HCT VFR BLD CALC: 27.7 % — LOW (ref 34.5–45)
HGB BLD-MCNC: 8.4 G/DL — LOW (ref 11.5–15.5)
MCHC RBC-ENTMCNC: 28.1 PG — SIGNIFICANT CHANGE UP (ref 27–34)
MCHC RBC-ENTMCNC: 30.3 % — LOW (ref 32–36)
MCV RBC AUTO: 92.6 FL — SIGNIFICANT CHANGE UP (ref 80–100)
NRBC # FLD: 0 — SIGNIFICANT CHANGE UP
PLATELET # BLD AUTO: 278 K/UL — SIGNIFICANT CHANGE UP (ref 150–400)
PMV BLD: 10.6 FL — SIGNIFICANT CHANGE UP (ref 7–13)
POTASSIUM SERPL-MCNC: 3.7 MMOL/L — SIGNIFICANT CHANGE UP (ref 3.5–5.3)
POTASSIUM SERPL-SCNC: 3.7 MMOL/L — SIGNIFICANT CHANGE UP (ref 3.5–5.3)
PROT SERPL-MCNC: 7.1 G/DL — SIGNIFICANT CHANGE UP (ref 6–8.3)
RBC # BLD: 2.99 M/UL — LOW (ref 3.8–5.2)
RBC # FLD: 17.8 % — HIGH (ref 10.3–14.5)
SODIUM SERPL-SCNC: 135 MMOL/L — SIGNIFICANT CHANGE UP (ref 135–145)
WBC # BLD: 12.08 K/UL — HIGH (ref 3.8–10.5)
WBC # FLD AUTO: 12.08 K/UL — HIGH (ref 3.8–10.5)

## 2018-11-18 RX ADMIN — INSULIN GLARGINE 24 UNIT(S): 100 INJECTION, SOLUTION SUBCUTANEOUS at 21:42

## 2018-11-18 RX ADMIN — ERTAPENEM SODIUM 100 MILLIGRAM(S): 1 INJECTION, POWDER, LYOPHILIZED, FOR SOLUTION INTRAMUSCULAR; INTRAVENOUS at 23:14

## 2018-11-18 RX ADMIN — PANTOPRAZOLE SODIUM 40 MILLIGRAM(S): 20 TABLET, DELAYED RELEASE ORAL at 05:22

## 2018-11-18 RX ADMIN — Medication 50 MILLIGRAM(S): at 17:45

## 2018-11-18 RX ADMIN — Medication 1 TABLET(S): at 13:09

## 2018-11-18 RX ADMIN — SENNA PLUS 2 TABLET(S): 8.6 TABLET ORAL at 21:22

## 2018-11-18 RX ADMIN — GABAPENTIN 300 MILLIGRAM(S): 400 CAPSULE ORAL at 05:21

## 2018-11-18 RX ADMIN — Medication 60 MILLIGRAM(S): at 05:21

## 2018-11-18 RX ADMIN — Medication 50 MILLIGRAM(S): at 05:21

## 2018-11-18 RX ADMIN — CHLORHEXIDINE GLUCONATE 1 APPLICATION(S): 213 SOLUTION TOPICAL at 05:21

## 2018-11-18 RX ADMIN — ERTAPENEM SODIUM 100 MILLIGRAM(S): 1 INJECTION, POWDER, LYOPHILIZED, FOR SOLUTION INTRAMUSCULAR; INTRAVENOUS at 00:42

## 2018-11-18 RX ADMIN — ATORVASTATIN CALCIUM 80 MILLIGRAM(S): 80 TABLET, FILM COATED ORAL at 00:41

## 2018-11-18 RX ADMIN — CHLORHEXIDINE GLUCONATE 1 APPLICATION(S): 213 SOLUTION TOPICAL at 17:45

## 2018-11-18 RX ADMIN — ISOSORBIDE MONONITRATE 30 MILLIGRAM(S): 60 TABLET, EXTENDED RELEASE ORAL at 13:09

## 2018-11-18 RX ADMIN — SENNA PLUS 2 TABLET(S): 8.6 TABLET ORAL at 00:41

## 2018-11-18 RX ADMIN — ATORVASTATIN CALCIUM 80 MILLIGRAM(S): 80 TABLET, FILM COATED ORAL at 21:22

## 2018-11-18 RX ADMIN — GABAPENTIN 300 MILLIGRAM(S): 400 CAPSULE ORAL at 17:45

## 2018-11-18 RX ADMIN — Medication 100 MILLIGRAM(S): at 13:09

## 2018-11-18 NOTE — PROGRESS NOTE ADULT - SUBJECTIVE AND OBJECTIVE BOX
INTERVAL HPI/OVERNIGHT EVENTS:  pt seen and examined earlier this am  afebrile  wbc improving     MEDICATIONS  (STANDING):  aspirin enteric coated 81 milliGRAM(s) Oral daily  atorvastatin 40 milliGRAM(s) Oral at bedtime  calcium carbonate 1250 mG  + Vitamin D (OsCal 500 + D) 1 Tablet(s) Oral daily  chlorhexidine 2% Cloths 1 Application(s) Topical daily  clopidogrel Tablet 75 milliGRAM(s) Oral daily  dextrose 5%. 1000 milliLiter(s) (50 mL/Hr) IV Continuous <Continuous>  dextrose 50% Injectable 12.5 Gram(s) IV Push once  dextrose 50% Injectable 25 Gram(s) IV Push once  dextrose 50% Injectable 25 Gram(s) IV Push once  docusate sodium 100 milliGRAM(s) Oral three times a day  epoetin analilia Injectable 73915 Unit(s) IV Push <User Schedule>  ertapenem  IVPB      ferrous    sulfate 325 milliGRAM(s) Oral three times a day  gabapentin 300 milliGRAM(s) Oral daily  heparin  Injectable 5000 Unit(s) SubCutaneous every 8 hours  insulin lispro (HumaLOG) corrective regimen sliding scale   SubCutaneous Before meals and at bedtime  isosorbide   mononitrate ER Tablet (IMDUR) 60 milliGRAM(s) Oral <User Schedule>  isosorbide   mononitrate ER Tablet (IMDUR) 30 milliGRAM(s) Oral every 24 hours  metoprolol tartrate 50 milliGRAM(s) Oral two times a day  NIFEdipine XL 90 milliGRAM(s) Oral daily  ondansetron Injectable 4 milliGRAM(s) IV Push once  pantoprazole    Tablet 40 milliGRAM(s) Oral before breakfast  polyethylene glycol 3350 17 Gram(s) Oral daily  senna 2 Tablet(s) Oral at bedtime  ursodiol Capsule 300 milliGRAM(s) Oral every 12 hours    MEDICATIONS  (PRN):  acetaminophen   Tablet .. 650 milliGRAM(s) Oral every 6 hours PRN Mild Pain (1 - 3)  dextrose 40% Gel 15 Gram(s) Oral once PRN Blood Glucose LESS THAN 70 milliGRAM(s)/deciliter  glucagon  Injectable 1 milliGRAM(s) IntraMuscular once PRN Glucose LESS THAN 70 milligrams/deciliter  simethicone 80 milliGRAM(s) Chew every 6 hours PRN abdominal bloating      Allergies    No Known Drug Allergies  Purell (Rash)    Intolerances        Review of Systems:    General:  No wt loss, fevers, chills, night sweats, fatigue   Eyes:  Good vision, no reported pain  ENT:  No sore throat, pain, runny nose, dysphagia  CV:  No pain, palpitations, hypo/hypertension  Resp:  No dyspnea, cough, tachypnea, wheezing  GI:  +pain, No nausea, +vomiting, No diarrhea, No constipation, No weight loss, No fever, No pruritis, No rectal bleeding, No melena, No dysphagia  :  No pain, bleeding, incontinence, nocturia  Muscle:  No pain, weakness  Neuro:  No weakness, tingling, memory problems  Psych:  No fatigue, insomnia, mood problems, depression  Endocrine:  No polyuria, polydypsia, cold/heat intolerance  Heme:  No petechiae, ecchymosis, easy bruisability  Skin:  No rash, tattoos, scars, edema      Vital Signs Last 24 Hrs  T(C): 36.9 (16 Nov 2018 05:52), Max: 37.7 (16 Nov 2018 00:47)  T(F): 98.4 (16 Nov 2018 05:52), Max: 99.9 (16 Nov 2018 00:47)  HR: 85 (16 Nov 2018 05:52) (66 - 89)  BP: 149/77 (16 Nov 2018 05:52) (130/68 - 176/74)  BP(mean): --  RR: 18 (16 Nov 2018 05:52) (18 - 18)  SpO2: 92% (16 Nov 2018 05:52) (92% - 100%)    PHYSICAL EXAM:    Constitutional: NAD, lying in bed  HEENT: ncat  Neck: No LAD  Respiratory: dec bs  Cardiovascular: S1 and S2, RRR  Gastrointestinal: obese abd soft generalized mild ttp +dt   Extremities: no edema  Vascular: 2+ peripheral pulses  Neurological: awake alert   Skin: No rashes      LABS:                        8.6    21.92 )-----------( 343      ( 16 Nov 2018 08:46 )             28.3     11-16    136  |  95<L>  |  22  ----------------------------<  157<H>  4.4   |  30  |  4.50<H>    Ca    8.1<L>      16 Nov 2018 08:46    TPro  7.4  /  Alb  2.0<L>  /  TBili  2.2<H>  /  DBili  1.80<H>  /  AST  127<H>  /  ALT  50  /  AlkPhos  466<H>  11-16          RADIOLOGY & ADDITIONAL TESTS:

## 2018-11-18 NOTE — CONSULT NOTE ADULT - SUBJECTIVE AND OBJECTIVE BOX
Chief Complaint:  Patient is a 68y old  Female who presents with a chief complaint of Transfer for higher level of care (2018 11:32)    HPI:  68 year old female with hx of ESRD on hemodialysis, CAD s/p stent, DM2, presents as transfer for ERCP. Patient was initially admitted to [facility] in the setting of two days of nausea, vomiting, abdominal pain and fevers secondary to acute cholecystitis s/p percutaneous cholecystostomy complicated by ESBL E. Coli bacteremia (on Ertapenem). Patient underwent ERCP on 18, but possible 5mm stone in CBD was unable to be removed. Patient was then transferred to [facility] for further care and repeat ERCP. Patient currently denies abdominal pain, nausea, vomiting and diarrhea.    Allergies:  No Known Drug Allergies  Purell (Rash)    Home Medications:  Senna / Colace / Simethicone / Ferrous Sulfate  Pantoprazole  ASA  Crestor  Gabapentin  Clopidogrel  Nifedipine  Imdur    Hospital Medications:  acetaminophen   Tablet .. 650 milliGRAM(s) Oral every 6 hours PRN  atorvastatin 80 milliGRAM(s) Oral at bedtime  calcium carbonate 1250 mG  + Vitamin D (OsCal 500 + D) 1 Tablet(s) Oral daily  chlorhexidine 4% Liquid 1 Application(s) Topical two times a day  dextrose 40% Gel 15 Gram(s) Oral once PRN  dextrose 5%. 1000 milliLiter(s) IV Continuous <Continuous>  dextrose 50% Injectable 12.5 Gram(s) IV Push once  docusate sodium 100 milliGRAM(s) Oral daily  epoetin analilia Injectable 58253 Unit(s) IV Push <User Schedule>  ertapenem  IVPB 500 milliGRAM(s) IV Intermittent every 24 hours  gabapentin 300 milliGRAM(s) Oral two times a day  glucagon  Injectable 1 milliGRAM(s) IntraMuscular once PRN  insulin glargine Injectable (LANTUS) 24 Unit(s) SubCutaneous at bedtime  insulin lispro (HumaLOG) corrective regimen sliding scale   SubCutaneous three times a day before meals  isosorbide   mononitrate ER Tablet (IMDUR) 30 milliGRAM(s) Oral daily  metoprolol tartrate 50 milliGRAM(s) Oral two times a day  NIFEdipine XL 60 milliGRAM(s) Oral daily  pantoprazole    Tablet 40 milliGRAM(s) Oral before breakfast  senna 2 Tablet(s) Oral at bedtime    PMHX/PSHX:  ESRD (end stage renal disease) on dialysis  CAD (coronary artery disease)  Diabetes  CRF (chronic renal failure)  Hypertension  Pneumonia  Myocardial infarction  Hypertension  Diabetes  H/O: hysterectomy    Family history:  No pertinent family history in first degree relatives  Denies family history of PUD, IBD, colon cancer/polyps, stomach cancer/polyps, pancreatic cancer/masses, liver cancer/disease, breast/ovarian cancer and endometrial cancer.     Social History:   No history of tobacco use, EtOH use or illicit drug use     ROS:   General:  No fevers, chills or night sweats.  ENT:  No sore throat or dysphagia  CV:  No pain or palpitations  Resp:  No dyspnea, cough, wheezing  GI:  No pain, No nausea, No vomiting, No diarrhea, No constipation, No weight loss, No pruritis, No rectal bleeding, No tarry stools  Skin:  No rash or edema    PHYSICAL EXAM:   GENERAL:  NAD, Appears stated age  HEENT:  NC/AT,  conjunctivae clear and pink  CHEST:  CTA B/L, Normal effort  HEART:  RRR S1/S2, No murmurs  ABDOMEN:  Soft, mildly tender to palpation throughout upper abdomen, non-distended, normoactive bowel sounds  EXTEREMITIES:  No cyanosis or Edema  SKIN:  Warm & Dry.  NEURO:  Alert, oriented    Vital Signs:  Vital Signs Last 24 Hrs  T(C): 37.1 (2018 13:08), Max: 37.2 (2018 05:20)  T(F): 98.7 (2018 13:08), Max: 99 (2018 05:20)  HR: 71 (2018 13:08) (71 - 83)  BP: 137/65 (2018 13:08) (118/44 - 137/65)  BP(mean): --  RR: 18 (2018 13:08) (18 - 18)  SpO2: 100% (2018 13:08) (94% - 100%)  Daily     Daily Weight in k.5 (2018 00:00)    LABS:                        8.4    12.08 )-----------( 278      ( 2018 01:30 )             27.7     Mean Cell Volume: 92.6 fL (- @ 01:30)        135  |  95<L>  |  12  ----------------------------<  90  3.7   |  28  |  2.98<H>    Ca    8.7      2018 01:30    TPro  7.1  /  Alb  2.6<L>  /  TBili  1.0  /  DBili  x   /  AST  61<H>  /  ALT  40<H>  /  AlkPhos  367<H>  11-18    LIVER FUNCTIONS - ( 2018 01:30 )  Alb: 2.6 g/dL / Pro: 7.1 g/dL / ALK PHOS: 367 u/L / ALT: 40 u/L / AST: 61 u/L / GGT: x           Imaging:

## 2018-11-18 NOTE — PROVIDER CONTACT NOTE (OTHER) - SITUATION
Patient had blood cultures ordered for 11/17 afternoon, were not drawn, and patient was off unit at dialysis. Blood cultures to be drawn now, but antibiotics were started, paused now.

## 2018-11-18 NOTE — PROGRESS NOTE ADULT - SUBJECTIVE AND OBJECTIVE BOX
[facility] - Rosenda / Umer LAL /Cale/ LUKASZ Mosher/ LUKASZ Flanagan/ Ed Vogt / JERRY Njeru  ---------------------------------------------------------------------------------------------------------------    Patient seen and examined bedside    Subjective and Objective: No overnight events, denies sob. No new complaints today.    Allergies: No Known Drug Allergies  Purell (Rash)      Hospital Medications:   MEDICATIONS  (STANDING):  atorvastatin 80 milliGRAM(s) Oral at bedtime  calcium carbonate 1250 mG  + Vitamin D (OsCal 500 + D) 1 Tablet(s) Oral daily  chlorhexidine 4% Liquid 1 Application(s) Topical two times a day  dextrose 5%. 1000 milliLiter(s) (50 mL/Hr) IV Continuous <Continuous>  dextrose 50% Injectable 12.5 Gram(s) IV Push once  docusate sodium 100 milliGRAM(s) Oral daily  epoetin analilia Injectable 99773 Unit(s) IV Push <User Schedule>  ertapenem  IVPB 500 milliGRAM(s) IV Intermittent every 24 hours  gabapentin 300 milliGRAM(s) Oral two times a day  insulin glargine Injectable (LANTUS) 24 Unit(s) SubCutaneous at bedtime  insulin lispro (HumaLOG) corrective regimen sliding scale   SubCutaneous three times a day before meals  isosorbide   mononitrate ER Tablet (IMDUR) 30 milliGRAM(s) Oral daily  metoprolol tartrate 50 milliGRAM(s) Oral two times a day  NIFEdipine XL 60 milliGRAM(s) Oral daily  pantoprazole    Tablet 40 milliGRAM(s) Oral before breakfast  senna 2 Tablet(s) Oral at bedtime      VITALS:  T(F): 98.7 (11-18-18 @ 13:08), Max: 99 (11-18-18 @ 05:20)  HR: 71 (11-18-18 @ 13:08)  BP: 137/65 (11-18-18 @ 13:08)  RR: 18 (11-18-18 @ 13:08)  SpO2: 100% (11-18-18 @ 13:08)  Wt(kg): --    11-17 @ 07:01  -  11-18 @ 07:00  --------------------------------------------------------  IN: 400 mL / OUT: 1900 mL / NET: -1500 mL      PHYSICAL EXAM:  Constitutional: NAD  HEENT: anicteric sclera, oropharynx clear  Neck: No JVD  Respiratory: CTAB, no wheezes, rales or rhonchi  Cardiovascular: S1, S2, RRR  Gastrointestinal: BS+, soft, NT, obese  Extremities: No cyanosis or clubbing. No peripheral edema  Neurological: A/O x 2, no focal deficits  Psychiatric: Normal mood, normal affect  : No CVA tenderness. No aguilar.   Skin: No rashes  HD ACCESS: [facility]      LABS:  11-18    135  |  95<L>  |  12  ----------------------------<  90  3.7   |  28  |  2.98<H>    Ca    8.7      18 Nov 2018 01:30    TPro  7.1  /  Alb  2.6<L>  /  TBili  1.0  /  DBili      /  AST  61<H>  /  ALT  40<H>  /  AlkPhos  367<H>  11-18    Creatinine Trend: 2.98 <--, 4.50 <--, 3.70 <--, 7.20 <--, 6.00 <--, 4.60 <--, 7.70 <--                        8.4    12.08 )-----------( 278      ( 18 Nov 2018 01:30 )             27.7     Urine Studies:        RADIOLOGY & ADDITIONAL STUDIES:

## 2018-11-18 NOTE — CONSULT NOTE ADULT - ASSESSMENT
Impression:  # Acute Cholecystitis / Cholangitis: Patient s/p perc kevin (now removed), currently being treated with Ertapenem. Failed ERCP for removal of 5mm stone.  # ESRD on hemodialysis   # CAD s/p stent  # DM2    Recommendation:  - Plan for ERCP early this week. Please place NPO @ MN tonight  - Continue ertapenem  - Supportive care per primary team    Kary Damico MD  Gastroenterology Fellow  [phone]  Please page on call fellow on weekends and after 5pm on weekdays

## 2018-11-19 LAB
BASOPHILS # BLD AUTO: 0.05 K/UL — SIGNIFICANT CHANGE UP (ref 0–0.2)
BASOPHILS NFR BLD AUTO: 0.5 % — SIGNIFICANT CHANGE UP (ref 0–2)
BUN SERPL-MCNC: 21 MG/DL — SIGNIFICANT CHANGE UP (ref 7–23)
CALCIUM SERPL-MCNC: 8.9 MG/DL — SIGNIFICANT CHANGE UP (ref 8.4–10.5)
CHLORIDE SERPL-SCNC: 97 MMOL/L — LOW (ref 98–107)
CO2 SERPL-SCNC: 28 MMOL/L — SIGNIFICANT CHANGE UP (ref 22–31)
CREAT SERPL-MCNC: 5.26 MG/DL — HIGH (ref 0.5–1.3)
EOSINOPHIL # BLD AUTO: 0.24 K/UL — SIGNIFICANT CHANGE UP (ref 0–0.5)
EOSINOPHIL NFR BLD AUTO: 2.5 % — SIGNIFICANT CHANGE UP (ref 0–6)
GLUCOSE SERPL-MCNC: 104 MG/DL — HIGH (ref 70–99)
HCT VFR BLD CALC: 27.1 % — LOW (ref 34.5–45)
HGB BLD-MCNC: 7.9 G/DL — LOW (ref 11.5–15.5)
IMM GRANULOCYTES # BLD AUTO: 0.05 # — SIGNIFICANT CHANGE UP
IMM GRANULOCYTES NFR BLD AUTO: 0.5 % — SIGNIFICANT CHANGE UP (ref 0–1.5)
INR BLD: 1.13 — SIGNIFICANT CHANGE UP (ref 0.88–1.17)
LYMPHOCYTES # BLD AUTO: 2.41 K/UL — SIGNIFICANT CHANGE UP (ref 1–3.3)
LYMPHOCYTES # BLD AUTO: 25 % — SIGNIFICANT CHANGE UP (ref 13–44)
MAGNESIUM SERPL-MCNC: 2.1 MG/DL — SIGNIFICANT CHANGE UP (ref 1.6–2.6)
MCHC RBC-ENTMCNC: 27.1 PG — SIGNIFICANT CHANGE UP (ref 27–34)
MCHC RBC-ENTMCNC: 29.2 % — LOW (ref 32–36)
MCV RBC AUTO: 93.1 FL — SIGNIFICANT CHANGE UP (ref 80–100)
MONOCYTES # BLD AUTO: 1.12 K/UL — HIGH (ref 0–0.9)
MONOCYTES NFR BLD AUTO: 11.6 % — SIGNIFICANT CHANGE UP (ref 2–14)
NEUTROPHILS # BLD AUTO: 5.77 K/UL — SIGNIFICANT CHANGE UP (ref 1.8–7.4)
NEUTROPHILS NFR BLD AUTO: 59.9 % — SIGNIFICANT CHANGE UP (ref 43–77)
NRBC # FLD: 0 — SIGNIFICANT CHANGE UP
PHOSPHATE SERPL-MCNC: 4.2 MG/DL — SIGNIFICANT CHANGE UP (ref 2.5–4.5)
PLATELET # BLD AUTO: 276 K/UL — SIGNIFICANT CHANGE UP (ref 150–400)
PMV BLD: 11 FL — SIGNIFICANT CHANGE UP (ref 7–13)
POTASSIUM SERPL-MCNC: 4.4 MMOL/L — SIGNIFICANT CHANGE UP (ref 3.5–5.3)
POTASSIUM SERPL-SCNC: 4.4 MMOL/L — SIGNIFICANT CHANGE UP (ref 3.5–5.3)
PROTHROM AB SERPL-ACNC: 12.9 SEC — SIGNIFICANT CHANGE UP (ref 9.8–13.1)
RBC # BLD: 2.91 M/UL — LOW (ref 3.8–5.2)
RBC # FLD: 17.9 % — HIGH (ref 10.3–14.5)
SODIUM SERPL-SCNC: 136 MMOL/L — SIGNIFICANT CHANGE UP (ref 135–145)
SPECIMEN SOURCE: SIGNIFICANT CHANGE UP
SPECIMEN SOURCE: SIGNIFICANT CHANGE UP
WBC # BLD: 9.64 K/UL — SIGNIFICANT CHANGE UP (ref 3.8–10.5)
WBC # FLD AUTO: 9.64 K/UL — SIGNIFICANT CHANGE UP (ref 3.8–10.5)

## 2018-11-19 PROCEDURE — 99223 1ST HOSP IP/OBS HIGH 75: CPT | Mod: GC

## 2018-11-19 PROCEDURE — 43262 ENDO CHOLANGIOPANCREATOGRAPH: CPT | Mod: GC,59

## 2018-11-19 PROCEDURE — 43274 ERCP DUCT STENT PLACEMENT: CPT | Mod: GC,59

## 2018-11-19 PROCEDURE — 74328 X-RAY BILE DUCT ENDOSCOPY: CPT | Mod: 26,GC

## 2018-11-19 PROCEDURE — 43264 ERCP REMOVE DUCT CALCULI: CPT | Mod: GC

## 2018-11-19 RX ORDER — DEXTROSE 50 % IN WATER 50 %
12.5 SYRINGE (ML) INTRAVENOUS ONCE
Qty: 0 | Refills: 0 | Status: COMPLETED | OUTPATIENT
Start: 2018-11-19 | End: 2018-11-19

## 2018-11-19 RX ORDER — INSULIN GLARGINE 100 [IU]/ML
12 INJECTION, SOLUTION SUBCUTANEOUS AT BEDTIME
Qty: 0 | Refills: 0 | Status: DISCONTINUED | OUTPATIENT
Start: 2018-11-19 | End: 2018-11-20

## 2018-11-19 RX ORDER — INSULIN GLARGINE 100 [IU]/ML
20 INJECTION, SOLUTION SUBCUTANEOUS AT BEDTIME
Qty: 0 | Refills: 0 | Status: DISCONTINUED | OUTPATIENT
Start: 2018-11-19 | End: 2018-11-19

## 2018-11-19 RX ADMIN — Medication 12.5 GRAM(S): at 19:49

## 2018-11-19 RX ADMIN — Medication 12.5 GRAM(S): at 05:51

## 2018-11-19 RX ADMIN — GABAPENTIN 300 MILLIGRAM(S): 400 CAPSULE ORAL at 05:51

## 2018-11-19 RX ADMIN — Medication 50 MILLIGRAM(S): at 05:51

## 2018-11-19 RX ADMIN — Medication 12.5 GRAM(S): at 11:37

## 2018-11-19 RX ADMIN — PANTOPRAZOLE SODIUM 40 MILLIGRAM(S): 20 TABLET, DELAYED RELEASE ORAL at 05:51

## 2018-11-19 RX ADMIN — ERYTHROPOIETIN 10000 UNIT(S): 10000 INJECTION, SOLUTION INTRAVENOUS; SUBCUTANEOUS at 22:35

## 2018-11-19 RX ADMIN — Medication 60 MILLIGRAM(S): at 05:51

## 2018-11-19 RX ADMIN — CHLORHEXIDINE GLUCONATE 1 APPLICATION(S): 213 SOLUTION TOPICAL at 06:33

## 2018-11-19 NOTE — PROGRESS NOTE ADULT - SUBJECTIVE AND OBJECTIVE BOX
[facility], Division of Infectious Diseases  DEB Barbosa A. Lee  [phone]  Name: IRENE TAYLOR  Age: 68y  Gender: Female  MRN: [id number]    Interval History--  Notes reviewed-- family at bedside  pt is uncomfortable in the bed    Past Medical History--  ESRD (end stage renal disease) on dialysis  CAD (coronary artery disease)  Diabetes  CRF (chronic renal failure)  Hypertension  Pneumonia  Myocardial infarction  Hypertension  Diabetes  H/O: hysterectomy      For details regarding the patient's social history, family history, and other miscellaneous elements, please refer the initial infectious diseases consultation and/or the admitting history and physical examination for this admission.    Allergies    No Known Drug Allergies  Purell (Rash)    Intolerances        Medications--  Antibiotics:  ertapenem  IVPB 500 milliGRAM(s) IV Intermittent every 24 hours    Immunologic:  epoetin analilia Injectable 54308 Unit(s) IV Push <User Schedule>    Other:  acetaminophen   Tablet .. PRN  atorvastatin  calcium carbonate 1250 mG  + Vitamin D (OsCal 500 + D)  chlorhexidine 4% Liquid  dextrose 40% Gel PRN  dextrose 5%.  dextrose 50% Injectable  docusate sodium  gabapentin  glucagon  Injectable PRN  insulin glargine Injectable (LANTUS)  insulin lispro (HumaLOG) corrective regimen sliding scale  isosorbide   mononitrate ER Tablet (IMDUR)  metoprolol tartrate  NIFEdipine XL  pantoprazole    Tablet  senna      Review of Systems--  A 10-point review of systems was obtained.     Pertinent positives and negatives--  Constitutional: No fevers. No Chills. No Rigors.   Cardiovascular: No chest pain. No palpitations.  Respiratory: No shortness of breath. No cough.  Gastrointestinal: No nausea or vomiting. No diarrhea or constipation.   Psychiatric: + depression    Review of systems otherwise negative except as previously noted.    Physical Examination--  Vital Signs: T(F): 98.3 (11-19-18 @ 12:54), Max: 98.6 (11-19-18 @ 05:36)  HR: 86 (11-19-18 @ 12:54)  BP: 150/68 (11-19-18 @ 12:54)  RR: 18 (11-19-18 @ 12:54)  SpO2: 98% (11-19-18 @ 12:54)  Wt(kg): --  General: Nontoxic-appearing Female in no acute distress.  HEENT: AT/NC.+ arcus + missing dentition  Neck: Not rigid. No sense of mass.  Nodes: None palpable.  Lungs: Clear bilaterally without rales, wheezing or rhonchi  Heart: Regular rate and rhythm. No Murmur. No rub. No gallop. No palpable thrill.  Abdomen: Bowel sounds present and normoactive. Soft. Nondistended. Nontender.  Extremities: No cyanosis or clubbing. + edema.   Skin: Warm. Dry. Good turgor. No rash. No vasculitic stigmata.  Psychiatric: Appropriate affect and mood for situation.         Laboratory Studies--  CBC                        7.9    9.64  )-----------( 276      ( 19 Nov 2018 06:30 )             27.1       Chemistries  11-19    136  |  97<L>  |  21  ----------------------------<  104<H>  4.4   |  28  |  5.26<H>    Ca    8.9      19 Nov 2018 06:30  Phos  4.2     11-19  Mg     2.1     11-19    TPro  7.1  /  Alb  2.6<L>  /  TBili  1.0  /  DBili  x   /  AST  61<H>  /  ALT  40<H>  /  AlkPhos  367<H>  11-18      Culture Data    Culture - Blood (collected 18 Nov 2018 01:49)  Source: BLOOD PERIPHERAL  Preliminary Report (19 Nov 2018 01:48):    NO ORGANISMS ISOLATED    NO ORGANISMS ISOLATED AT 24 HOURS    Culture - Blood (collected 18 Nov 2018 01:49)  Source: BLOOD  Preliminary Report (19 Nov 2018 01:48):    NO ORGANISMS ISOLATED    NO ORGANISMS ISOLATED AT 24 HOURS

## 2018-11-19 NOTE — PROGRESS NOTE ADULT - PROBLEM SELECTOR PLAN 6
type 2 stable  Hypoglycemia noted. Reduce standing Lantus dose to 12 Units for now and continue coverage

## 2018-11-19 NOTE — PROGRESS NOTE ADULT - SUBJECTIVE AND OBJECTIVE BOX
Patient is a 68y old  Female who presents with a chief complaint of Transfer for higher level of care (18 Nov 2018 15:58)      SUBJECTIVE / OVERNIGHT EVENTS:  Lethargic, hypoglycemic improving now  Review of Systems:   CONSTITUTIONAL: No fever, weight loss, or fatigue  EYES: No eye pain, visual disturbances, or discharge  ENMT:  No difficulty hearing, tinnitus, vertigo; No sinus or throat pain  NECK: No pain or stiffness  BREASTS: No pain, masses, or nipple discharge  RESPIRATORY: No cough, wheezing, chills or hemoptysis; No shortness of breath  CARDIOVASCULAR: No chest pain, palpitations, dizziness, or leg swelling  GASTROINTESTINAL: No abdominal or epigastric pain. No nausea, vomiting, or hematemesis; No diarrhea or constipation. No melena or hematochezia.  GENITOURINARY: No dysuria, frequency, hematuria, or incontinence  NEUROLOGICAL: No headaches, memory loss, loss of strength, numbness, or tremors  SKIN: No itching, burning, rashes, or lesions   LYMPH NODES: No enlarged glands  ENDOCRINE: No heat or cold intolerance; No hair loss  MUSCULOSKELETAL: No joint pain or swelling; No muscle, back, or extremity pain  PSYCHIATRIC: No depression, anxiety, mood swings, or difficulty sleeping  HEME/LYMPH: No easy bruising, or bleeding gums  ALLERY AND IMMUNOLOGIC: No hives or eczema    MEDICATIONS  (STANDING):  atorvastatin 80 milliGRAM(s) Oral at bedtime  calcium carbonate 1250 mG  + Vitamin D (OsCal 500 + D) 1 Tablet(s) Oral daily  chlorhexidine 4% Liquid 1 Application(s) Topical two times a day  dextrose 5%. 1000 milliLiter(s) (50 mL/Hr) IV Continuous <Continuous>  dextrose 50% Injectable 12.5 Gram(s) IV Push once  docusate sodium 100 milliGRAM(s) Oral daily  epoetin analilia Injectable 75418 Unit(s) IV Push <User Schedule>  ertapenem  IVPB 500 milliGRAM(s) IV Intermittent every 24 hours  gabapentin 300 milliGRAM(s) Oral two times a day  insulin glargine Injectable (LANTUS) 12 Unit(s) SubCutaneous at bedtime  insulin lispro (HumaLOG) corrective regimen sliding scale   SubCutaneous three times a day before meals  isosorbide   mononitrate ER Tablet (IMDUR) 30 milliGRAM(s) Oral daily  metoprolol tartrate 50 milliGRAM(s) Oral two times a day  NIFEdipine XL 60 milliGRAM(s) Oral daily  pantoprazole    Tablet 40 milliGRAM(s) Oral before breakfast  senna 2 Tablet(s) Oral at bedtime    MEDICATIONS  (PRN):  acetaminophen   Tablet .. 650 milliGRAM(s) Oral every 6 hours PRN Temp greater or equal to 38C (100.4F)  dextrose 40% Gel 15 Gram(s) Oral once PRN Blood Glucose LESS THAN 70 milliGRAM(s)/deciliter  glucagon  Injectable 1 milliGRAM(s) IntraMuscular once PRN Glucose LESS THAN 70 milligrams/deciliter      PHYSICAL EXAM:  Vital Signs Last 24 Hrs  T(C): 37 (19 Nov 2018 05:36), Max: 37.1 (18 Nov 2018 13:08)  T(F): 98.6 (19 Nov 2018 05:36), Max: 98.7 (18 Nov 2018 13:08)  HR: 70 (19 Nov 2018 05:36) (64 - 75)  BP: 154/62 (19 Nov 2018 05:36) (132/67 - 154/62)  BP(mean): --  RR: 18 (19 Nov 2018 05:36) (18 - 18)  SpO2: 92% (19 Nov 2018 05:36) (92% - 100%)  I&O's Summary    18 Nov 2018 07:01  -  19 Nov 2018 07:00  --------------------------------------------------------  IN: 0 mL / OUT: 300 mL / NET: -300 mL      GENERAL: NAD, well-developed  HEAD:  Atraumatic, Normocephalic  EYES: EOMI, PERRLA, conjunctiva and sclera clear  NECK: Supple, No JVD  CHEST/LUNG: Clear to auscultation bilaterally; No wheeze  HEART: Regular rate and rhythm; No murmurs, rubs, or gallops  ABDOMEN: Soft, Nontender, Nondistended; Bowel sounds present  EXTREMITIES:  2+ Peripheral Pulses, No clubbing, cyanosis, or edema  PSYCH: AAOx3  NEUROLOGY: non-focal  SKIN: No rashes or lesions    LABS:  CAPILLARY BLOOD GLUCOSE      POCT Blood Glucose.: 129 mg/dL (19 Nov 2018 11:36)  POCT Blood Glucose.: 138 mg/dL (19 Nov 2018 11:17)  POCT Blood Glucose.: 40 mg/dL (19 Nov 2018 10:39)  POCT Blood Glucose.: 78 mg/dL (19 Nov 2018 07:22)  POCT Blood Glucose.: 115 mg/dL (19 Nov 2018 06:17)  POCT Blood Glucose.: 56 mg/dL (19 Nov 2018 05:40)  POCT Blood Glucose.: 57 mg/dL (19 Nov 2018 05:38)  POCT Blood Glucose.: 128 mg/dL (18 Nov 2018 21:39)  POCT Blood Glucose.: 110 mg/dL (18 Nov 2018 17:10)  POCT Blood Glucose.: 97 mg/dL (18 Nov 2018 11:59)                          7.9    9.64  )-----------( 276      ( 19 Nov 2018 06:30 )             27.1     11-19    136  |  97<L>  |  21  ----------------------------<  104<H>  4.4   |  28  |  5.26<H>    Ca    8.9      19 Nov 2018 06:30  Phos  4.2     11-19  Mg     2.1     11-19    TPro  7.1  /  Alb  2.6<L>  /  TBili  1.0  /  DBili  x   /  AST  61<H>  /  ALT  40<H>  /  AlkPhos  367<H>  11-18    PT/INR - ( 19 Nov 2018 06:30 )   PT: 12.9 SEC;   INR: 1.13                    RADIOLOGY & ADDITIONAL TESTS:    Imaging Personally Reviewed:    Consultant(s) Notes Reviewed:      Care Discussed with Consultants/Other Providers:

## 2018-11-19 NOTE — CHART NOTE - NSCHARTNOTEFT_GEN_A_CORE
GI Chart Note:    Patient seen and examined, GI consult reviewed.  Discussed with advanced attending. Will plan for ERCP today.  f/u repeat fingerstick  Keep NPO     Please call with questions  Vicenta Sánchez  GI Fellow  Pager: [phone] GI Chart Note:    Patient seen and examined, GI consult reviewed.  Discussed with advanced attending. Will plan for ERCP today.  f/u repeat fingerstick  Keep NPO   Please had HD performed in evening tonight if no contraindications    Please call with questions  Vicenta Sánchez  GI Fellow  Pager: [phone]

## 2018-11-19 NOTE — PROGRESS NOTE ADULT - SUBJECTIVE AND OBJECTIVE BOX
INTERVAL HPI/OVERNIGHT EVENTS:    denies n/v/d/c, abdominal pain, melena or brbpr   NPO for ERCP today    MEDICATIONS  (STANDING):  atorvastatin 80 milliGRAM(s) Oral at bedtime  calcium carbonate 1250 mG  + Vitamin D (OsCal 500 + D) 1 Tablet(s) Oral daily  chlorhexidine 4% Liquid 1 Application(s) Topical two times a day  dextrose 5%. 1000 milliLiter(s) (50 mL/Hr) IV Continuous <Continuous>  dextrose 50% Injectable 12.5 Gram(s) IV Push once  docusate sodium 100 milliGRAM(s) Oral daily  epoetin analilia Injectable 02877 Unit(s) IV Push <User Schedule>  ertapenem  IVPB 500 milliGRAM(s) IV Intermittent every 24 hours  gabapentin 300 milliGRAM(s) Oral two times a day  insulin glargine Injectable (LANTUS) 12 Unit(s) SubCutaneous at bedtime  insulin lispro (HumaLOG) corrective regimen sliding scale   SubCutaneous three times a day before meals  isosorbide   mononitrate ER Tablet (IMDUR) 30 milliGRAM(s) Oral daily  metoprolol tartrate 50 milliGRAM(s) Oral two times a day  NIFEdipine XL 60 milliGRAM(s) Oral daily  pantoprazole    Tablet 40 milliGRAM(s) Oral before breakfast  senna 2 Tablet(s) Oral at bedtime    MEDICATIONS  (PRN):  acetaminophen   Tablet .. 650 milliGRAM(s) Oral every 6 hours PRN Temp greater or equal to 38C (100.4F)  dextrose 40% Gel 15 Gram(s) Oral once PRN Blood Glucose LESS THAN 70 milliGRAM(s)/deciliter  glucagon  Injectable 1 milliGRAM(s) IntraMuscular once PRN Glucose LESS THAN 70 milligrams/deciliter      Allergies    No Known Drug Allergies  Purell (Rash)    Intolerances        Review of Systems:    General:  No wt loss, fevers, chills, night sweats, fatigue   Eyes:  Good vision, no reported pain  ENT:  No sore throat, pain, runny nose, dysphagia  CV:  No pain, palpitations, hypo/hypertension  Resp:  No dyspnea, cough, tachypnea, wheezing  GI:  No pain, No nausea, No vomiting, No diarrhea, No constipation, No weight loss, No fever, No pruritis, No rectal bleeding, No melena, No dysphagia  :  No pain, bleeding, incontinence, nocturia  Muscle:  No pain, weakness  Neuro:  No weakness, tingling, memory problems  Psych:  No fatigue, insomnia, mood problems, depression  Endocrine:  No polyuria, polydypsia, cold/heat intolerance  Heme:  No petechiae, ecchymosis, easy bruisability  Skin:  No rash, tattoos, scars, edema      Vital Signs Last 24 Hrs  T(C): 37 (19 Nov 2018 05:36), Max: 37.1 (18 Nov 2018 13:08)  T(F): 98.6 (19 Nov 2018 05:36), Max: 98.7 (18 Nov 2018 13:08)  HR: 70 (19 Nov 2018 05:36) (64 - 75)  BP: 154/62 (19 Nov 2018 05:36) (132/67 - 154/62)  BP(mean): --  RR: 18 (19 Nov 2018 05:36) (18 - 18)  SpO2: 92% (19 Nov 2018 05:36) (92% - 100%)    PHYSICAL EXAM:    Constitutional: NAD  HEENT: EOMI, throat clear  Neck: No LAD, supple  Respiratory: CTA and P  Cardiovascular: S1 and S2, RRR, no M  Gastrointestinal: BS+, soft, NT/ND, neg HSM,  Extremities: No peripheral edema, neg clubbing, cyanosis  Vascular: 2+ peripheral pulses  Neurological: A/O x 3, no focal deficits  Psychiatric: Normal mood, normal affect  Skin: No rashes      LABS:                        7.9    9.64  )-----------( 276      ( 19 Nov 2018 06:30 )             27.1     11-19    136  |  97<L>  |  21  ----------------------------<  104<H>  4.4   |  28  |  5.26<H>    Ca    8.9      19 Nov 2018 06:30  Phos  4.2     11-19  Mg     2.1     11-19    TPro  7.1  /  Alb  2.6<L>  /  TBili  1.0  /  DBili  x   /  AST  61<H>  /  ALT  40<H>  /  AlkPhos  367<H>  11-18    PT/INR - ( 19 Nov 2018 06:30 )   PT: 12.9 SEC;   INR: 1.13                RADIOLOGY & ADDITIONAL TESTS:

## 2018-11-19 NOTE — PROGRESS NOTE ADULT - SUBJECTIVE AND OBJECTIVE BOX
[facility] - Rosenda / Umer LAL /Cale/ LUKASZ Mosher/ LUKASZ Flanagan/ Ed Vogt / JERRY Njeru  ---------------------------------------------------------------------------------------------------------------    Patient seen and examined bedside    Subjective and Objective: No overnight events, denies sob. No complaints today. feeling better  NPO for ERCP today    Allergies: No Known Drug Allergies  Purell (Rash)      Hospital Medications:   MEDICATIONS  (STANDING):  atorvastatin 80 milliGRAM(s) Oral at bedtime  calcium carbonate 1250 mG  + Vitamin D (OsCal 500 + D) 1 Tablet(s) Oral daily  chlorhexidine 4% Liquid 1 Application(s) Topical two times a day  dextrose 5%. 1000 milliLiter(s) (50 mL/Hr) IV Continuous <Continuous>  dextrose 50% Injectable 12.5 Gram(s) IV Push once  docusate sodium 100 milliGRAM(s) Oral daily  epoetin analilia Injectable 14811 Unit(s) IV Push <User Schedule>  ertapenem  IVPB 500 milliGRAM(s) IV Intermittent every 24 hours  gabapentin 300 milliGRAM(s) Oral two times a day  insulin glargine Injectable (LANTUS) 12 Unit(s) SubCutaneous at bedtime  insulin lispro (HumaLOG) corrective regimen sliding scale   SubCutaneous three times a day before meals  isosorbide   mononitrate ER Tablet (IMDUR) 30 milliGRAM(s) Oral daily  metoprolol tartrate 50 milliGRAM(s) Oral two times a day  NIFEdipine XL 60 milliGRAM(s) Oral daily  pantoprazole    Tablet 40 milliGRAM(s) Oral before breakfast  senna 2 Tablet(s) Oral at bedtime      VITALS:  T(F): 98.3 (11-19-18 @ 12:54), Max: 98.6 (11-19-18 @ 05:36)  HR: 86 (11-19-18 @ 12:54)  BP: 150/68 (11-19-18 @ 12:54)  RR: 18 (11-19-18 @ 12:54)  SpO2: 98% (11-19-18 @ 12:54)  Wt(kg): --    11-18 @ 07:01  -  11-19 @ 07:00  --------------------------------------------------------  IN: 0 mL / OUT: 300 mL / NET: -300 mL      Height (cm): 162.56 (11-19 @ 14:15)  Weight (kg): 98 (11-19 @ 14:15)  BMI (kg/m2): 37.1 (11-19 @ 14:15)  BSA (m2): 2.02 (11-19 @ 14:15)    PHYSICAL EXAM:  Constitutional: NAD  HEENT: anicteric sclera, oropharynx clear  Neck: No JVD  Respiratory: CTAB, no wheezes, rales or rhonchi  Cardiovascular: S1, S2, RRR  Gastrointestinal: BS+, soft, NT/ND  Extremities: No cyanosis or clubbing. No peripheral edema  Neurological: A/O x 3, no focal deficits  Psychiatric: Normal mood, normal affect  : No CVA tenderness. No aguilar.   Skin: No rashes  Vascular Access:    LABS:  11-19    136  |  97<L>  |  21  ----------------------------<  104<H>  4.4   |  28  |  5.26<H>    Ca    8.9      19 Nov 2018 06:30  Phos  4.2     11-19  Mg     2.1     11-19    TPro  7.1  /  Alb  2.6<L>  /  TBili  1.0  /  DBili      /  AST  61<H>  /  ALT  40<H>  /  AlkPhos  367<H>  11-18    Creatinine Trend: 5.26 <--, 2.98 <--, 4.50 <--, 3.70 <--, 7.20 <--, 6.00 <--, 4.60 <--                        7.9    9.64  )-----------( 276      ( 19 Nov 2018 06:30 )             27.1     Urine Studies:        RADIOLOGY & ADDITIONAL STUDIES:

## 2018-11-20 DIAGNOSIS — I25.10 ATHEROSCLEROTIC HEART DISEASE OF NATIVE CORONARY ARTERY WITHOUT ANGINA PECTORIS: ICD-10-CM

## 2018-11-20 LAB
BASOPHILS # BLD AUTO: 0.04 K/UL — SIGNIFICANT CHANGE UP (ref 0–0.2)
BASOPHILS NFR BLD AUTO: 0.5 % — SIGNIFICANT CHANGE UP (ref 0–2)
BUN SERPL-MCNC: 10 MG/DL — SIGNIFICANT CHANGE UP (ref 7–23)
CALCIUM SERPL-MCNC: 8.7 MG/DL — SIGNIFICANT CHANGE UP (ref 8.4–10.5)
CHLORIDE SERPL-SCNC: 97 MMOL/L — LOW (ref 98–107)
CO2 SERPL-SCNC: 31 MMOL/L — SIGNIFICANT CHANGE UP (ref 22–31)
CREAT SERPL-MCNC: 3.39 MG/DL — HIGH (ref 0.5–1.3)
EOSINOPHIL # BLD AUTO: 0.19 K/UL — SIGNIFICANT CHANGE UP (ref 0–0.5)
EOSINOPHIL NFR BLD AUTO: 2.2 % — SIGNIFICANT CHANGE UP (ref 0–6)
GLUCOSE SERPL-MCNC: 48 MG/DL — LOW (ref 70–99)
HCT VFR BLD CALC: 29.8 % — LOW (ref 34.5–45)
HGB BLD-MCNC: 8.7 G/DL — LOW (ref 11.5–15.5)
IMM GRANULOCYTES # BLD AUTO: 0.03 # — SIGNIFICANT CHANGE UP
IMM GRANULOCYTES NFR BLD AUTO: 0.3 % — SIGNIFICANT CHANGE UP (ref 0–1.5)
LYMPHOCYTES # BLD AUTO: 1.9 K/UL — SIGNIFICANT CHANGE UP (ref 1–3.3)
LYMPHOCYTES # BLD AUTO: 22 % — SIGNIFICANT CHANGE UP (ref 13–44)
MAGNESIUM SERPL-MCNC: 2 MG/DL — SIGNIFICANT CHANGE UP (ref 1.6–2.6)
MCHC RBC-ENTMCNC: 26.9 PG — LOW (ref 27–34)
MCHC RBC-ENTMCNC: 29.2 % — LOW (ref 32–36)
MCV RBC AUTO: 92 FL — SIGNIFICANT CHANGE UP (ref 80–100)
MONOCYTES # BLD AUTO: 1.05 K/UL — HIGH (ref 0–0.9)
MONOCYTES NFR BLD AUTO: 12.2 % — SIGNIFICANT CHANGE UP (ref 2–14)
NEUTROPHILS # BLD AUTO: 5.43 K/UL — SIGNIFICANT CHANGE UP (ref 1.8–7.4)
NEUTROPHILS NFR BLD AUTO: 62.8 % — SIGNIFICANT CHANGE UP (ref 43–77)
NRBC # FLD: 0 — SIGNIFICANT CHANGE UP
PHOSPHATE SERPL-MCNC: 2.7 MG/DL — SIGNIFICANT CHANGE UP (ref 2.5–4.5)
PLATELET # BLD AUTO: 277 K/UL — SIGNIFICANT CHANGE UP (ref 150–400)
PMV BLD: 10.9 FL — SIGNIFICANT CHANGE UP (ref 7–13)
POTASSIUM SERPL-MCNC: 4.1 MMOL/L — SIGNIFICANT CHANGE UP (ref 3.5–5.3)
POTASSIUM SERPL-SCNC: 4.1 MMOL/L — SIGNIFICANT CHANGE UP (ref 3.5–5.3)
RBC # BLD: 3.24 M/UL — LOW (ref 3.8–5.2)
RBC # FLD: 17.5 % — HIGH (ref 10.3–14.5)
SODIUM SERPL-SCNC: 136 MMOL/L — SIGNIFICANT CHANGE UP (ref 135–145)
WBC # BLD: 8.64 K/UL — SIGNIFICANT CHANGE UP (ref 3.8–10.5)
WBC # FLD AUTO: 8.64 K/UL — SIGNIFICANT CHANGE UP (ref 3.8–10.5)

## 2018-11-20 PROCEDURE — 99232 SBSQ HOSP IP/OBS MODERATE 35: CPT | Mod: GC

## 2018-11-20 RX ORDER — DEXTROSE 50 % IN WATER 50 %
25 SYRINGE (ML) INTRAVENOUS ONCE
Qty: 0 | Refills: 0 | Status: COMPLETED | OUTPATIENT
Start: 2018-11-20 | End: 2018-11-20

## 2018-11-20 RX ORDER — SODIUM CHLORIDE 9 MG/ML
1000 INJECTION, SOLUTION INTRAVENOUS
Qty: 0 | Refills: 0 | Status: DISCONTINUED | OUTPATIENT
Start: 2018-11-20 | End: 2018-11-20

## 2018-11-20 RX ORDER — DEXTROSE 50 % IN WATER 50 %
12.5 SYRINGE (ML) INTRAVENOUS ONCE
Qty: 0 | Refills: 0 | Status: COMPLETED | OUTPATIENT
Start: 2018-11-20 | End: 2018-11-20

## 2018-11-20 RX ADMIN — SENNA PLUS 2 TABLET(S): 8.6 TABLET ORAL at 00:36

## 2018-11-20 RX ADMIN — ATORVASTATIN CALCIUM 80 MILLIGRAM(S): 80 TABLET, FILM COATED ORAL at 22:34

## 2018-11-20 RX ADMIN — Medication 60 MILLIGRAM(S): at 05:39

## 2018-11-20 RX ADMIN — PANTOPRAZOLE SODIUM 40 MILLIGRAM(S): 20 TABLET, DELAYED RELEASE ORAL at 05:39

## 2018-11-20 RX ADMIN — ATORVASTATIN CALCIUM 80 MILLIGRAM(S): 80 TABLET, FILM COATED ORAL at 00:36

## 2018-11-20 RX ADMIN — Medication 100 MILLIGRAM(S): at 11:20

## 2018-11-20 RX ADMIN — CHLORHEXIDINE GLUCONATE 1 APPLICATION(S): 213 SOLUTION TOPICAL at 05:39

## 2018-11-20 RX ADMIN — Medication 50 MILLIGRAM(S): at 05:39

## 2018-11-20 RX ADMIN — Medication 50 MILLIGRAM(S): at 17:38

## 2018-11-20 RX ADMIN — ISOSORBIDE MONONITRATE 30 MILLIGRAM(S): 60 TABLET, EXTENDED RELEASE ORAL at 11:20

## 2018-11-20 RX ADMIN — GABAPENTIN 300 MILLIGRAM(S): 400 CAPSULE ORAL at 05:39

## 2018-11-20 RX ADMIN — Medication 25 GRAM(S): at 06:45

## 2018-11-20 RX ADMIN — ERTAPENEM SODIUM 100 MILLIGRAM(S): 1 INJECTION, POWDER, LYOPHILIZED, FOR SOLUTION INTRAMUSCULAR; INTRAVENOUS at 00:36

## 2018-11-20 RX ADMIN — GABAPENTIN 300 MILLIGRAM(S): 400 CAPSULE ORAL at 17:38

## 2018-11-20 RX ADMIN — Medication 12.5 GRAM(S): at 13:17

## 2018-11-20 RX ADMIN — Medication 1 TABLET(S): at 11:20

## 2018-11-20 NOTE — PROGRESS NOTE ADULT - SUBJECTIVE AND OBJECTIVE BOX
[facility], Division of Infectious Diseases  DEB Barbosa A. Lee  [phone]    Name: IRENE TAYLOR  Age: 68y  Gender: Female  MRN: [id number]    Interval History--  Notes reviewed. S/P ERCP 11/20. Tolerated without incident. Ate today without problems. No fevers, chills, or rigors. No SOB or CP. No N/V. (Family translating at patient request).      Past Medical History--  ESRD (end stage renal disease) on dialysis  CAD (coronary artery disease)  Diabetes  CRF (chronic renal failure)  Hypertension  Pneumonia  Myocardial infarction  Hypertension  Diabetes  H/O: hysterectomy      For details regarding the patient's social history, family history, and other miscellaneous elements, please refer the initial infectious diseases consultation and/or the admitting history and physical examination for this admission.    Allergies    No Known Drug Allergies  Purell (Rash)    Intolerances        Medications--  Antibiotics:  ertapenem  IVPB 500 milliGRAM(s) IV Intermittent every 24 hours    Immunologic:  epoetin analilia Injectable 18849 Unit(s) IV Push <User Schedule>    Other:  acetaminophen   Tablet .. PRN  atorvastatin  calcium carbonate 1250 mG  + Vitamin D (OsCal 500 + D)  chlorhexidine 4% Liquid  dextrose 40% Gel PRN  dextrose 5%.  dextrose 50% Injectable  docusate sodium  gabapentin  glucagon  Injectable PRN  insulin lispro (HumaLOG) corrective regimen sliding scale  isosorbide   mononitrate ER Tablet (IMDUR)  metoprolol tartrate  NIFEdipine XL  pantoprazole    Tablet  senna      Review of Systems--  A 10-point review of systems was obtained.   Review of systems otherwise negative except as previously noted.    Physical Examination--  Vital Signs: T(F): 98.2 (11-20-18 @ 13:58), Max: 99 (11-20-18 @ 05:33)  HR: 71 (11-20-18 @ 13:58)  BP: 135/56 (11-20-18 @ 13:58)  RR: 17 (11-20-18 @ 13:58)  SpO2: 96% (11-20-18 @ 13:58)  Wt(kg): --  General: Nontoxic-appearing Female in no acute distress.  HEENT: AT/NC. Anicteric. Conjunctiva pink and moist. Oropharynx clear.   Neck: Not rigid. No sense of mass.  Nodes: None palpable.  Chest: HD cath C/D/I  Lungs: Clear bilaterally without rales, wheezing or rhonchi  Heart: Regular rate and rhythm. No Murmur. No rub. No gallop. No palpable thrill.  Abdomen: Bowel sounds present and normoactive. Soft. Mildly distended. Nontender. No sense of mass. No organomegaly. Old drain site without evidence of infection.  Extremities: No cyanosis or clubbing. 1+ LE edema.   Skin: Warm. Dry. Good turgor. No rash. No vasculitic stigmata.  Psychiatric: Appropriate affect and mood for situation.         Laboratory Studies--  CBC                        8.7    8.64  )-----------( 277      ( 20 Nov 2018 06:15 )             29.8       Chemistries  11-20    136  |  97<L>  |  10  ----------------------------<  48<L>  4.1   |  31  |  3.39<H>    Ca    8.7      20 Nov 2018 06:15  Phos  2.7     11-20  Mg     2.0     11-20        Culture Data    Culture - Blood (collected 18 Nov 2018 01:49)  Source: BLOOD PERIPHERAL  Preliminary Report (20 Nov 2018 01:48):    NO ORGANISMS ISOLATED    NO ORGANISMS ISOLATED AT 48 HRS.    Culture - Blood (collected 18 Nov 2018 01:49)  Source: BLOOD  Preliminary Report (20 Nov 2018 01:48):    NO ORGANISMS ISOLATED    NO ORGANISMS ISOLATED AT 48 HRS.

## 2018-11-20 NOTE — PROGRESS NOTE ADULT - PROBLEM SELECTOR PLAN 2
s/p drainage and tube removal by IR at \[facility]\""  s/p ERCP with stone removal CBD stent placement 11/19/2018  cont abx   outpt fu for repeat ERCP/stent removal per house GI w/Dr. Lim [phone]  advance diet as tolerated

## 2018-11-20 NOTE — CONSULT NOTE ADULT - ASSESSMENT
Assessment  DMT2: 68y Female with DM T2 with hyperglycemia was on basal bolus insulin, blood sugars trending down, had hypoglycemic episode likely due to poor PO intake and being on insulin, , eating partial meals, non compliant with low carb diet.  Jaundice: Being worked up, Tx. GI team FU  CAD: On medications, stable, monitored.  HTN: Controlled, On med.  ESRD: On hemodialysis, Monitor labs/BMP

## 2018-11-20 NOTE — PROGRESS NOTE ADULT - SUBJECTIVE AND OBJECTIVE BOX
Chief Complaint:  Patient is a 68y old  Female who presents with a chief complaint of Transfer for higher level of care (2018 16:16)      Interval Events:   Patient tolerated ERCP yesterday. She denies abdominal pain, nausea or vomiting. She was hypoglycemic yesterday, AM Fingerstick 62 --> 151. No fevers or chills.   s/p ERCP with stone removal CBD stent placement.       Hospital Medications:  acetaminophen   Tablet .. 650 milliGRAM(s) Oral every 6 hours PRN  atorvastatin 80 milliGRAM(s) Oral at bedtime  calcium carbonate 1250 mG  + Vitamin D (OsCal 500 + D) 1 Tablet(s) Oral daily  chlorhexidine 4% Liquid 1 Application(s) Topical two times a day  dextrose 40% Gel 15 Gram(s) Oral once PRN  dextrose 5%. 1000 milliLiter(s) IV Continuous <Continuous>  dextrose 50% Injectable 12.5 Gram(s) IV Push once  docusate sodium 100 milliGRAM(s) Oral daily  epoetin analilia Injectable 64537 Unit(s) IV Push <User Schedule>  ertapenem  IVPB 500 milliGRAM(s) IV Intermittent every 24 hours  gabapentin 300 milliGRAM(s) Oral two times a day  glucagon  Injectable 1 milliGRAM(s) IntraMuscular once PRN  insulin glargine Injectable (LANTUS) 12 Unit(s) SubCutaneous at bedtime  insulin lispro (HumaLOG) corrective regimen sliding scale   SubCutaneous three times a day before meals  isosorbide   mononitrate ER Tablet (IMDUR) 30 milliGRAM(s) Oral daily  metoprolol tartrate 50 milliGRAM(s) Oral two times a day  NIFEdipine XL 60 milliGRAM(s) Oral daily  pantoprazole    Tablet 40 milliGRAM(s) Oral before breakfast  senna 2 Tablet(s) Oral at bedtime        PHYSICAL EXAM:   Vital Signs:  Vital Signs Last 24 Hrs  T(C): 37.2 (2018 05:33), Max: 37.2 (2018 05:33)  T(F): 99 (2018 05:33), Max: 99 (2018 05:33)  HR: 78 (2018 05:33) (71 - 86)  BP: 153/59 (2018 05:33) (150/68 - 176/77)  BP(mean): --  RR: 16 (2018 05:33) (16 - 18)  SpO2: 99% (2018 05:33) (98% - 99%)  Daily Height in cm: 162.56 (2018 12:54)    Daily Weight in k.7 (2018 00:07)    GENERAL:  Appears stated age,  no distress  HEENT:   sclera -anicteric  CHEST:   no increased effort, breath sounds clear  HEART:  Regular rhythm, S1, S2,   ABDOMEN:  Soft, non-tender, non-distended, normoactive bowel sounds,    EXTEREMITIES:  no cyanosis,clubbing or edema  SKIN:  No rash/no jaundice   NEURO:  Alert, oriented    LABS:                        8.7    8.64  )-----------( 277      ( 2018 06:15 )             29.8     Mean Cell Volume: 92.0 fL (18 @ 06:15)        136  |  97<L>  |  10  ----------------------------<  48<L>  4.1   |  31  |  3.39<H>    Ca    8.7      2018 06:15  Phos  2.7       Mg     2.0             PT/INR - ( 2018 06:30 )   PT: 12.9 SEC;   INR: 1.13                                      8.7    8.64  )-----------( 277      ( 2018 06:15 )             29.8                         7.9    9.64  )-----------( 276      ( 2018 06:30 )             27.1                         8.4    12.08 )-----------( 278      ( 2018 01:30 )             27.7     Imaging:  < from: ERCP (18 @ 16:14) >  [facility]  _______________________________________________________________________________  Patient Name: Javier Marquez             Procedure Date: 2018 4:14 PM  MRN: [id number]                     Account Number: [id number]  YOB: 1950              Admit Type: Inpatient  Room: [place] [address]                         Gender: Female  Attending MD: EJ RITTER MD        _______________________________________________________________________________     Procedure:           ERCP  Indications:         For therapy of bile duct stone(s), unable to cannulate                        bile duct on prior ERCP  Providers:           EJ RITTER MD, SAVI CELESTIN MD (Fellow)  Medicines:           General Anesthesia  Complications:       No immediate complications.  Procedure:           Pre-Anesthesia Assessment:                       - Prior to the procedure, a History and Physical was                        performed, and patient medications and allergies were                        reviewed. The risks and benefits of the procedure and                        the sedation options and risks were discussed with the                        patient. All questions were answered and informed    consent was obtained. Patient identification and                        proposed procedure were verified. After reviewing the                        risks and benefits, the patient was deemed in                        satisfactory condition to undergo the procedure. The                        anesthesia plan was to use general anesthesia.                        Immediately prior to administration of medications, the                        patient was re-assessed for adequacy to receive                sedatives. The heart rate, respiratory rate, oxygen                        saturations, blood pressure, adequacy of pulmonary                        ventilation, and response to care were monitored                        throughout the procedure. The physical status of the                        patient was re-assessed after the procedure.                       After obtaining informed consent, the scope was passed                        under direct vision. Throughout the procedure,the                        patient's blood pressure, pulse, and oxygen saturations                        were monitored continuously. The ERCP was introduced                        through the mouth, and advanced to the duodenum and used              to inject contrast into the bile duct and dorsal                        pancreatic duct. The ERCP was accomplished without                        difficulty. The patient tolerated the procedure well.                                            Findings:       The  film was normal. The esophagus was successfully intubated        under direct vision. The scope was advanced to a major papilla in the        descending duodenum at the lip of a diverticulum without detailed        examination of the pharynx, larynx and associated structures, and upper        GI tract. The upper GI tract was grossly normal. Deep cannulation of the        pancreatic duct was accomplished with a guidewire and a ProForma        sphincterotome. Using a double-wire technique and a RX44 Hydratome, the        bile duct was cannulated. A sphincterotomy was made with ERBE        electrocautery, with subsequent slow ooze of blood. Contrast was        injected into the bile duct, and a filling defect consistent with a bile        duct stone was identified in the mid-bile duct. The biliary tree was        swept with a 15 mm balloon starting at the bifurcation. Bile and one        stone was removed. No stones remained. A 10 Fr x 5 cm plastic biliary        stent was placed into the bile duct. Two DuraClips were placed at the        apex of the sphincterotomy to prevent post-sphincterotomy bleeding.                 Impression:          - Choledocholithiasis was found. Complete removal was                        accomplished by biliary sphincterotomy and balloon                        extraction. A 10 Fr x 5 cm plastic biliary stent was               placed into the bile duct. Two DuraClips were placed at                        the apex of the sphincterotomy to prevent                        post-sphincterotomy bleeding.  Recommendation:      - [facility].                       - Clear liquid diet tonight.                       - Repeat ERCP for stent removal in 2-4 weeks.                                                                                   Attending Participation:       I was present and participated during the entire procedure, including        non-key portions.                                                                                     _________________  EJ RITTER MD  2018 7:08:25 AM  This report has beensigned electronically.  Number of Addenda: 0    Note Initiated On: 2018 4:14 PM    < end of copied text >

## 2018-11-20 NOTE — CONSULT NOTE ADULT - REASON FOR ADMISSION
Transfer for higher level of care

## 2018-11-20 NOTE — PROGRESS NOTE ADULT - SUBJECTIVE AND OBJECTIVE BOX
Pt seen and examined at bedside  feels better  no fever,chills  no n/v/d  abd pain better  Had HD yesterday, tolerated it well  denies dyspnea, chest pain      Allergies:  No Known Drug Allergies  Purell (Rash)    Hospital Medications:   MEDICATIONS  (STANDING):  atorvastatin 80 milliGRAM(s) Oral at bedtime  calcium carbonate 1250 mG  + Vitamin D (OsCal 500 + D) 1 Tablet(s) Oral daily  chlorhexidine 4% Liquid 1 Application(s) Topical two times a day  dextrose 5%. 1000 milliLiter(s) (50 mL/Hr) IV Continuous <Continuous>  dextrose 50% Injectable 12.5 Gram(s) IV Push once  dextrose 50% Injectable 12.5 Gram(s) IV Push once  docusate sodium 100 milliGRAM(s) Oral daily  epoetin analilia Injectable 06676 Unit(s) IV Push <User Schedule>  ertapenem  IVPB 500 milliGRAM(s) IV Intermittent every 24 hours  gabapentin 300 milliGRAM(s) Oral two times a day  insulin lispro (HumaLOG) corrective regimen sliding scale   SubCutaneous three times a day before meals  isosorbide   mononitrate ER Tablet (IMDUR) 30 milliGRAM(s) Oral daily  metoprolol tartrate 50 milliGRAM(s) Oral two times a day  NIFEdipine XL 60 milliGRAM(s) Oral daily  pantoprazole    Tablet 40 milliGRAM(s) Oral before breakfast  senna 2 Tablet(s) Oral at bedtime            VITALS:  T(F): 98.7 (11-20-18 @ 11:15), Max: 99 (11-20-18 @ 05:33)  HR: 73 (11-20-18 @ 11:15)  BP: 169/77 (11-20-18 @ 11:15)  RR: 16 (11-20-18 @ 11:15)  SpO2: 96% (11-20-18 @ 11:15)  Wt(kg): --    11-19 @ 07:01  -  11-20 @ 07:00  --------------------------------------------------------  IN: 400 mL / OUT: 1900 mL / NET: -1500 mL      Height (cm): 162.56 (11-19 @ 14:15)  Weight (kg): 98 (11-19 @ 14:15)  BMI (kg/m2): 37.1 (11-19 @ 14:15)  BSA (m2): 2.02 (11-19 @ 14:15)  PHYSICAL EXAM:  Constitutional: NAD  HEENT: anicteric sclera, oropharynx clear, MMM  Neck: No JVD  Respiratory: CTAB, no wheezes, rales or rhonchi  Cardiovascular: S1, S2, RRR  Gastrointestinal: BS+, soft, NT/ND  Extremities: No cyanosis or clubbing. No peripheral edema  Neurological: A/O x 3, no focal deficits  Psychiatric: Normal mood, normal affect  : No CVA tenderness. No aguilar.   Skin: No rashes  Vascular Access:  permacath    LABS:  11-20    136  |  97<L>  |  10  ----------------------------<  48<L>  4.1   |  31  |  3.39<H>    Ca    8.7      20 Nov 2018 06:15  Phos  2.7     11-20  Mg     2.0     11-20      Creatinine Trend: 3.39 <--, 5.26 <--, 2.98 <--, 4.50 <--, 3.70 <--, 7.20 <--, 6.00 <--                        8.7    8.64  )-----------( 277      ( 20 Nov 2018 06:15 )             29.8     Urine Studies:      RADIOLOGY & ADDITIONAL STUDIES:

## 2018-11-20 NOTE — PROVIDER CONTACT NOTE (OTHER) - ACTION/TREATMENT ORDERED:
D50 given as per protocol and PA notified, will recheck the FS and close monitoring, patient is asymptomatic, explained the situation, no acute distress noted at this time.
Continue to assess and monitor,see flowsheet.
Push dextrose 50% and recheck blood sugar.
Pushed dextrose 50% and recheck blood sugar.
Will draw blood cultures now.
Will give dextrose 50%.
give apple juice and reassess blood sugar.

## 2018-11-20 NOTE — CONSULT NOTE ADULT - SUBJECTIVE AND OBJECTIVE BOX
HPI:  68F with multiple medical issues including ESRD/HD, CAD/MI/stent, DM2,Acute cholecystitis causing sepsis, S/P Perc Noa 10/30 and ESBL E. coli bacteremia. she was treated with IV ertapenem. Also developed A fIb with RVR, improved now. ERCP was attempted at [facility] but was unsuccesful. Now transferred here for further management. At this time, she is A, A & O x3. Mild RUQ pain reported, with mild nausea. She has been NPO for the past few days. (16 Nov 2018 19:24)  Patient has history of diabetes, on oral meds at home, no recent hypoglycemic episodes, no polyuria polydipsia. Patient follows up with PCP for diabetes management.    PAST MEDICAL & SURGICAL HISTORY:  ESRD (end stage renal disease) on dialysis: MWF  CAD (coronary artery disease): s/p stent in 2007  Diabetes  Myocardial infarction  Hypertension  H/O: hysterectomy      FAMILY HISTORY:  No pertinent family history in first degree relatives      Social History:    Outpatient Medications:    MEDICATIONS  (STANDING):  atorvastatin 80 milliGRAM(s) Oral at bedtime  calcium carbonate 1250 mG  + Vitamin D (OsCal 500 + D) 1 Tablet(s) Oral daily  chlorhexidine 4% Liquid 1 Application(s) Topical two times a day  dextrose 5%. 1000 milliLiter(s) (50 mL/Hr) IV Continuous <Continuous>  dextrose 50% Injectable 12.5 Gram(s) IV Push once  docusate sodium 100 milliGRAM(s) Oral daily  epoetin analilia Injectable 26984 Unit(s) IV Push <User Schedule>  ertapenem  IVPB 500 milliGRAM(s) IV Intermittent every 24 hours  gabapentin 300 milliGRAM(s) Oral two times a day  insulin lispro (HumaLOG) corrective regimen sliding scale   SubCutaneous three times a day before meals  isosorbide   mononitrate ER Tablet (IMDUR) 30 milliGRAM(s) Oral daily  metoprolol tartrate 50 milliGRAM(s) Oral two times a day  NIFEdipine XL 60 milliGRAM(s) Oral daily  pantoprazole    Tablet 40 milliGRAM(s) Oral before breakfast  senna 2 Tablet(s) Oral at bedtime    MEDICATIONS  (PRN):  acetaminophen   Tablet .. 650 milliGRAM(s) Oral every 6 hours PRN Temp greater or equal to 38C (100.4F)  dextrose 40% Gel 15 Gram(s) Oral once PRN Blood Glucose LESS THAN 70 milliGRAM(s)/deciliter  glucagon  Injectable 1 milliGRAM(s) IntraMuscular once PRN Glucose LESS THAN 70 milligrams/deciliter      Allergies    No Known Drug Allergies  Purell (Rash)    Intolerances      Review of Systems:  Constitutional: No fever, no chills  Eyes: No blurry vision  Neuro: No tremors  HEENT: No pain, no neck swelling  Cardiovascular: No chest pain, no palpitations  Respiratory: Has SOB, no cough  GI: No nausea, vomiting, abdominal pain  : No dysuria  Skin: no rash  MSK: Has leg swelling.  Psych: no depression  Endocrine: no polyuria, polydipsia    ALL OTHER SYSTEMS REVIEWED AND NEGATIVE    UNABLE TO OBTAIN    PHYSICAL EXAM:  VITALS: T(C): 36.8 (11-20-18 @ 13:58)  T(F): 98.2 (11-20-18 @ 13:58), Max: 99 (11-20-18 @ 05:33)  HR: 71 (11-20-18 @ 13:58) (71 - 78)  BP: 135/56 (11-20-18 @ 13:58) (135/56 - 176/77)  RR:  (16 - 18)  SpO2:  (96% - 99%)  Wt(kg): --  GENERAL: NAD, well-groomed, well-developed  EYES: No proptosis, no lid lag  HEENT:  Atraumatic, Normocephalic  THYROID: Normal size, no palpable nodules  RESPIRATORY: Clear to auscultation bilaterally; No rales, rhonchi, wheezing  CARDIOVASCULAR: Si S2, No murmurs;  GI: Soft, non distended, normal bowel sounds  SKIN: Dry, intact, No rashes or lesions  MUSCULOSKELETAL: Has BL lower extremity edema.  NEURO:  no tremor, sensation decreased in feet BL,    POCT Blood Glucose.: 125 mg/dL (11-20-18 @ 13:36)  POCT Blood Glucose.: 47 mg/dL (11-20-18 @ 13:00)  POCT Blood Glucose.: 41 mg/dL (11-20-18 @ 12:40)  POCT Blood Glucose.: 151 mg/dL (11-20-18 @ 07:11)  POCT Blood Glucose.: 62 mg/dL (11-20-18 @ 06:38)  POCT Blood Glucose.: 56 mg/dL (11-20-18 @ 06:15)  POCT Blood Glucose.: 72 mg/dL (11-20-18 @ 02:03)  POCT Blood Glucose.: 100 mg/dL (11-19-18 @ 22:27)  POCT Blood Glucose.: 119 mg/dL (11-19-18 @ 20:08)  POCT Blood Glucose.: 60 mg/dL (11-19-18 @ 19:43)  POCT Blood Glucose.: 75 mg/dL (11-19-18 @ 17:07)  POCT Blood Glucose.: 61 mg/dL (11-19-18 @ 16:28)  POCT Blood Glucose.: 129 mg/dL (11-19-18 @ 11:36)  POCT Blood Glucose.: 138 mg/dL (11-19-18 @ 11:17)  POCT Blood Glucose.: 40 mg/dL (11-19-18 @ 10:39)  POCT Blood Glucose.: 78 mg/dL (11-19-18 @ 07:22)  POCT Blood Glucose.: 115 mg/dL (11-19-18 @ 06:17)  POCT Blood Glucose.: 56 mg/dL (11-19-18 @ 05:40)  POCT Blood Glucose.: 57 mg/dL (11-19-18 @ 05:38)  POCT Blood Glucose.: 128 mg/dL (11-18-18 @ 21:39)  POCT Blood Glucose.: 110 mg/dL (11-18-18 @ 17:10)  POCT Blood Glucose.: 97 mg/dL (11-18-18 @ 11:59)  POCT Blood Glucose.: 117 mg/dL (11-18-18 @ 09:28)  POCT Blood Glucose.: 278 mg/dL (11-18-18 @ 09:00)  POCT Blood Glucose.: 94 mg/dL (11-18-18 @ 08:50)  POCT Blood Glucose.: 57 mg/dL (11-18-18 @ 08:16)  POCT Blood Glucose.: 119 mg/dL (11-17-18 @ 21:29)  POCT Blood Glucose.: 172 mg/dL (11-17-18 @ 16:37)                            8.7    8.64  )-----------( 277      ( 20 Nov 2018 06:15 )             29.8       11-20    136  |  97<L>  |  10  ----------------------------<  48<L>  4.1   |  31  |  3.39<H>    EGFR if : 15  EGFR if non : 13    Ca    8.7      11-20  Mg     2.0     11-20  Phos  2.7     11-20    TPro  7.1  /  Alb  2.6<L>  /  TBili  1.0  /  DBili  x   /  AST  61<H>  /  ALT  40<H>  /  AlkPhos  367<H>  11-18      Thyroid Function Tests:      Hemoglobin A1C, Whole Blood: 9.6 % <H> [4.0 - 5.6] (10-30-18 @ 07:40)          Radiology:

## 2018-11-20 NOTE — PROGRESS NOTE ADULT - SUBJECTIVE AND OBJECTIVE BOX
Patient is a 68y old  Female who presents with a chief complaint of Transfer for higher level of care (20 Nov 2018 16:31)      SUBJECTIVE / OVERNIGHT EVENTS:  Persistent hypoglycemia noted. S/p ERCP yesterday. No GI symptoms.  Review of Systems:   CONSTITUTIONAL: No fever, weight loss, or fatigue  EYES: No eye pain, visual disturbances, or discharge  ENMT:  No difficulty hearing, tinnitus, vertigo; No sinus or throat pain  NECK: No pain or stiffness  BREASTS: No pain, masses, or nipple discharge  RESPIRATORY: No cough, wheezing, chills or hemoptysis; No shortness of breath  CARDIOVASCULAR: No chest pain, palpitations, dizziness, or leg swelling  GASTROINTESTINAL: No abdominal or epigastric pain. No nausea, vomiting, or hematemesis; No diarrhea or constipation. No melena or hematochezia.  GENITOURINARY: No dysuria, frequency, hematuria, or incontinence  NEUROLOGICAL: No headaches, memory loss, loss of strength, numbness, or tremors  SKIN: No itching, burning, rashes, or lesions   LYMPH NODES: No enlarged glands  ENDOCRINE: No heat or cold intolerance; No hair loss  MUSCULOSKELETAL: No joint pain or swelling; No muscle, back, or extremity pain  PSYCHIATRIC: No depression, anxiety, mood swings, or difficulty sleeping  HEME/LYMPH: No easy bruising, or bleeding gums  ALLERY AND IMMUNOLOGIC: No hives or eczema    MEDICATIONS  (STANDING):  atorvastatin 80 milliGRAM(s) Oral at bedtime  calcium carbonate 1250 mG  + Vitamin D (OsCal 500 + D) 1 Tablet(s) Oral daily  chlorhexidine 4% Liquid 1 Application(s) Topical two times a day  dextrose 5%. 1000 milliLiter(s) (50 mL/Hr) IV Continuous <Continuous>  dextrose 50% Injectable 12.5 Gram(s) IV Push once  docusate sodium 100 milliGRAM(s) Oral daily  epoetin analilia Injectable 33130 Unit(s) IV Push <User Schedule>  ertapenem  IVPB 500 milliGRAM(s) IV Intermittent every 24 hours  gabapentin 300 milliGRAM(s) Oral two times a day  insulin lispro (HumaLOG) corrective regimen sliding scale   SubCutaneous three times a day before meals  isosorbide   mononitrate ER Tablet (IMDUR) 30 milliGRAM(s) Oral daily  metoprolol tartrate 50 milliGRAM(s) Oral two times a day  NIFEdipine XL 60 milliGRAM(s) Oral daily  pantoprazole    Tablet 40 milliGRAM(s) Oral before breakfast  senna 2 Tablet(s) Oral at bedtime    MEDICATIONS  (PRN):  acetaminophen   Tablet .. 650 milliGRAM(s) Oral every 6 hours PRN Temp greater or equal to 38C (100.4F)  dextrose 40% Gel 15 Gram(s) Oral once PRN Blood Glucose LESS THAN 70 milliGRAM(s)/deciliter  glucagon  Injectable 1 milliGRAM(s) IntraMuscular once PRN Glucose LESS THAN 70 milligrams/deciliter      PHYSICAL EXAM:  Vital Signs Last 24 Hrs  T(C): 36.8 (20 Nov 2018 13:58), Max: 37.2 (20 Nov 2018 05:33)  T(F): 98.2 (20 Nov 2018 13:58), Max: 99 (20 Nov 2018 05:33)  HR: 69 (20 Nov 2018 17:37) (69 - 78)  BP: 142/62 (20 Nov 2018 17:37) (135/56 - 176/77)  BP(mean): --  RR: 17 (20 Nov 2018 13:58) (16 - 18)  SpO2: 96% (20 Nov 2018 13:58) (96% - 99%)  I&O's Summary    19 Nov 2018 07:01  -  20 Nov 2018 07:00  --------------------------------------------------------  IN: 400 mL / OUT: 1900 mL / NET: -1500 mL      GENERAL: NAD, well-developed  HEAD:  Atraumatic, Normocephalic  EYES: EOMI, PERRLA, conjunctiva and sclera clear  NECK: Supple, No JVD  CHEST/LUNG: Clear to auscultation bilaterally; No wheeze  HEART: Regular rate and rhythm; No murmurs, rubs, or gallops  ABDOMEN: Soft, Nontender, Nondistended; Bowel sounds present  EXTREMITIES:  2+ Peripheral Pulses, No clubbing, cyanosis, or edema  PSYCH: AAOx3  NEUROLOGY: non-focal  SKIN: No rashes or lesions    LABS:  CAPILLARY BLOOD GLUCOSE      POCT Blood Glucose.: 159 mg/dL (20 Nov 2018 17:25)  POCT Blood Glucose.: 125 mg/dL (20 Nov 2018 13:36)  POCT Blood Glucose.: 47 mg/dL (20 Nov 2018 13:00)  POCT Blood Glucose.: 41 mg/dL (20 Nov 2018 12:40)  POCT Blood Glucose.: 151 mg/dL (20 Nov 2018 07:11)  POCT Blood Glucose.: 62 mg/dL (20 Nov 2018 06:38)  POCT Blood Glucose.: 56 mg/dL (20 Nov 2018 06:15)  POCT Blood Glucose.: 72 mg/dL (20 Nov 2018 02:03)  POCT Blood Glucose.: 100 mg/dL (19 Nov 2018 22:27)  POCT Blood Glucose.: 119 mg/dL (19 Nov 2018 20:08)  POCT Blood Glucose.: 60 mg/dL (19 Nov 2018 19:43)                          8.7    8.64  )-----------( 277      ( 20 Nov 2018 06:15 )             29.8     11-20    136  |  97<L>  |  10  ----------------------------<  48<L>  4.1   |  31  |  3.39<H>    Ca    8.7      20 Nov 2018 06:15  Phos  2.7     11-20  Mg     2.0     11-20      PT/INR - ( 19 Nov 2018 06:30 )   PT: 12.9 SEC;   INR: 1.13                    RADIOLOGY & ADDITIONAL TESTS:    Imaging Personally Reviewed:    Consultant(s) Notes Reviewed:      Care Discussed with Consultants/Other Providers:

## 2018-11-20 NOTE — PROGRESS NOTE ADULT - SUBJECTIVE AND OBJECTIVE BOX
INTERVAL HPI/OVERNIGHT EVENTS:    pt without abdominal pain   had a (+) BM this morning  no n/v      MEDICATIONS  (STANDING):  atorvastatin 80 milliGRAM(s) Oral at bedtime  calcium carbonate 1250 mG  + Vitamin D (OsCal 500 + D) 1 Tablet(s) Oral daily  chlorhexidine 4% Liquid 1 Application(s) Topical two times a day  dextrose 5%. 1000 milliLiter(s) (50 mL/Hr) IV Continuous <Continuous>  dextrose 50% Injectable 12.5 Gram(s) IV Push once  docusate sodium 100 milliGRAM(s) Oral daily  epoetin analilia Injectable 00208 Unit(s) IV Push <User Schedule>  ertapenem  IVPB 500 milliGRAM(s) IV Intermittent every 24 hours  gabapentin 300 milliGRAM(s) Oral two times a day  insulin glargine Injectable (LANTUS) 12 Unit(s) SubCutaneous at bedtime  insulin lispro (HumaLOG) corrective regimen sliding scale   SubCutaneous three times a day before meals  isosorbide   mononitrate ER Tablet (IMDUR) 30 milliGRAM(s) Oral daily  metoprolol tartrate 50 milliGRAM(s) Oral two times a day  NIFEdipine XL 60 milliGRAM(s) Oral daily  pantoprazole    Tablet 40 milliGRAM(s) Oral before breakfast  senna 2 Tablet(s) Oral at bedtime    MEDICATIONS  (PRN):  acetaminophen   Tablet .. 650 milliGRAM(s) Oral every 6 hours PRN Temp greater or equal to 38C (100.4F)  dextrose 40% Gel 15 Gram(s) Oral once PRN Blood Glucose LESS THAN 70 milliGRAM(s)/deciliter  glucagon  Injectable 1 milliGRAM(s) IntraMuscular once PRN Glucose LESS THAN 70 milligrams/deciliter      Allergies    No Known Drug Allergies  Purell (Rash)    Intolerances        Review of Systems:    General:  No wt loss, fevers, chills, night sweats, fatigue   Eyes:  Good vision, no reported pain  ENT:  No sore throat, pain, runny nose, dysphagia  CV:  No pain, palpitations, hypo/hypertension  Resp:  No dyspnea, cough, tachypnea, wheezing  GI:  No pain, No nausea, No vomiting, No diarrhea, No constipation, No weight loss, No fever, No pruritis, No rectal bleeding, No melena, No dysphagia  :  No pain, bleeding, incontinence, nocturia  Muscle:  No pain, weakness  Neuro:  No weakness, tingling, memory problems  Psych:  No fatigue, insomnia, mood problems, depression  Endocrine:  No polyuria, polydypsia, cold/heat intolerance  Heme:  No petechiae, ecchymosis, easy bruisability  Skin:  No rash, tattoos, scars, edema      Vital Signs Last 24 Hrs  T(C): 37.2 (20 Nov 2018 05:33), Max: 37.2 (20 Nov 2018 05:33)  T(F): 99 (20 Nov 2018 05:33), Max: 99 (20 Nov 2018 05:33)  HR: 78 (20 Nov 2018 05:33) (71 - 86)  BP: 153/59 (20 Nov 2018 05:33) (150/68 - 176/77)  BP(mean): --  RR: 16 (20 Nov 2018 05:33) (16 - 18)  SpO2: 99% (20 Nov 2018 05:33) (98% - 99%)    PHYSICAL EXAM:    Constitutional: NAD  HEENT: EOMI, throat clear  Neck: No LAD, supple  Respiratory: CTA and P  Cardiovascular: S1 and S2, RRR, no M  Gastrointestinal: BS+, soft, NT/ND, neg HSM,  Extremities: No peripheral edema, neg clubbing, cyanosis  Vascular: 2+ peripheral pulses  Neurological: A/O x 3, no focal deficits  Psychiatric: Normal mood, normal affect  Skin: No rashes      LABS:                        8.7    8.64  )-----------( 277      ( 20 Nov 2018 06:15 )             29.8     11-20    136  |  97<L>  |  10  ----------------------------<  48<L>  4.1   |  31  |  3.39<H>    Ca    8.7      20 Nov 2018 06:15  Phos  2.7     11-20  Mg     2.0     11-20      PT/INR - ( 19 Nov 2018 06:30 )   PT: 12.9 SEC;   INR: 1.13                RADIOLOGY & ADDITIONAL TESTS:  < from: ERCP (11.19.18 @ 16:14) >    [facility]  _______________________________________________________________________________  Patient Name: Javier Marquez             Procedure Date: 11/19/2018 4:14 PM  MRN: [id number]                     Account Number: [id number]  YOB: 1950              Admit Type: Inpatient  Room: [place] [address]                         Gender: Female  Attending MD: EJ RITTER MD        _______________________________________________________________________________     Procedure:           ERCP  Indications:         For therapy of bile duct stone(s), unable to cannulate                        bile duct on prior ERCP  Providers:           EJ RITTER MD, SAVI CELESTIN MD (Fellow)  Medicines:           General Anesthesia  Complications:       No immediate complications.  Procedure:           Pre-Anesthesia Assessment:                       - Prior to the procedure, a History and Physical was                        performed, and patient medications and allergies were                        reviewed. The risks and benefits of the procedure and                        the sedation options and risks were discussed with the                        patient. All questions were answered and informed    consent was obtained. Patient identification and                        proposed procedure were verified. After reviewing the                        risks and benefits, the patient was deemed in                        satisfactory condition to undergo the procedure. The                        anesthesia plan was to use general anesthesia.                        Immediately prior to administration of medications, the                        patient was re-assessed for adequacy to receive                sedatives. The heart rate, respiratory rate, oxygen                        saturations, blood pressure, adequacy of pulmonary                        ventilation, and response to care were monitored                        throughout the procedure. The physical status of the                        patient was re-assessed after the procedure.                       After obtaining informed consent, the scope was passed                        under direct vision. Throughout the procedure,the                        patient's blood pressure, pulse, and oxygen saturations                        were monitored continuously. The ERCP was introduced                        through the mouth, and advanced to the duodenum and used              to inject contrast into the bile duct and dorsal                        pancreatic duct. The ERCP was accomplished without                        difficulty. The patient tolerated the procedure well.                                            Findings:       The  film was normal. The esophagus was successfully intubated        under direct vision. The scope was advanced to a major papilla in the        descending duodenum at the lip of a diverticulum without detailed        examination of the pharynx, larynx and associated structures, and upper        GI tract. The upper GI tract was grossly normal. Deep cannulation of the        pancreatic duct was accomplished with a guidewire and a ProForma        sphincterotome. Using a double-wire technique and a RX44 Hydratome, the        bile duct was cannulated. A sphincterotomy was made with ERBE        electrocautery, with subsequent slow ooze of blood. Contrast was        injected into the bile duct, and a filling defect consistent with a bile        duct stone was identified in the mid-bile duct. The biliary tree was        swept with a 15 mm balloon starting at the bifurcation. Bile and one        stone was removed. No stones remained. A 10 Fr x 5 cm plastic biliary        stent was placed into the bile duct. Two DuraClips were placed at the        apex of the sphincterotomy to prevent post-sphincterotomy bleeding.                 Impression:          - Choledocholithiasis was found. Complete removal was                        accomplished by biliary sphincterotomy and balloon                        extraction. A 10 Fr x 5 cm plastic biliary stent was               placed into the bile duct. Two DuraClips were placed at                        the apex of the sphincterotomy to prevent                        post-sphincterotomy bleeding.  Recommendation:      - [facility].                       - Clear liquid diet tonight.                       - Repeat ERCP for stent removal in 2-4 weeks.                                                                                   Attending Participation:       I was present and participated during the entire procedure, including        non-key portions.                                                                                     _________________  EJ RITTER MD  11/20/2018 7:08:25 AM  This report has beensigned electronically.  Number of Addenda: 0    Note Initiated On: 11/19/2018 4:14 PM    < end of copied text >

## 2018-11-20 NOTE — CONSULT NOTE ADULT - PROBLEM SELECTOR RECOMMENDATION 9
Will get am cortisol, continue current insulin regimen for now. Will continue monitoring FS, log, will Follow up.  Patient counseled for compliance with consistent low carb diet.

## 2018-11-21 LAB
ALBUMIN SERPL ELPH-MCNC: 2.3 G/DL — LOW (ref 3.3–5)
ALP SERPL-CCNC: 300 U/L — HIGH (ref 40–120)
ALT FLD-CCNC: 15 U/L — SIGNIFICANT CHANGE UP (ref 4–33)
AST SERPL-CCNC: 27 U/L — SIGNIFICANT CHANGE UP (ref 4–32)
BASOPHILS # BLD AUTO: 0.02 K/UL — SIGNIFICANT CHANGE UP (ref 0–0.2)
BASOPHILS NFR BLD AUTO: 0.2 % — SIGNIFICANT CHANGE UP (ref 0–2)
BILIRUB SERPL-MCNC: 0.7 MG/DL — SIGNIFICANT CHANGE UP (ref 0.2–1.2)
BUN SERPL-MCNC: 17 MG/DL — SIGNIFICANT CHANGE UP (ref 7–23)
CALCIUM SERPL-MCNC: 8.5 MG/DL — SIGNIFICANT CHANGE UP (ref 8.4–10.5)
CHLORIDE SERPL-SCNC: 96 MMOL/L — LOW (ref 98–107)
CO2 SERPL-SCNC: 27 MMOL/L — SIGNIFICANT CHANGE UP (ref 22–31)
CORTIS SERPL-MCNC: 16.1 UG/DL — SIGNIFICANT CHANGE UP (ref 2.7–18.4)
CREAT SERPL-MCNC: 4.86 MG/DL — HIGH (ref 0.5–1.3)
EOSINOPHIL # BLD AUTO: 0.32 K/UL — SIGNIFICANT CHANGE UP (ref 0–0.5)
EOSINOPHIL NFR BLD AUTO: 3.4 % — SIGNIFICANT CHANGE UP (ref 0–6)
GLUCOSE SERPL-MCNC: 70 MG/DL — SIGNIFICANT CHANGE UP (ref 70–99)
HCT VFR BLD CALC: 28.1 % — LOW (ref 34.5–45)
HGB BLD-MCNC: 8.4 G/DL — LOW (ref 11.5–15.5)
IMM GRANULOCYTES # BLD AUTO: 0.04 # — SIGNIFICANT CHANGE UP
IMM GRANULOCYTES NFR BLD AUTO: 0.4 % — SIGNIFICANT CHANGE UP (ref 0–1.5)
LYMPHOCYTES # BLD AUTO: 2.94 K/UL — SIGNIFICANT CHANGE UP (ref 1–3.3)
LYMPHOCYTES # BLD AUTO: 30.9 % — SIGNIFICANT CHANGE UP (ref 13–44)
MCHC RBC-ENTMCNC: 27.8 PG — SIGNIFICANT CHANGE UP (ref 27–34)
MCHC RBC-ENTMCNC: 29.9 % — LOW (ref 32–36)
MCV RBC AUTO: 93 FL — SIGNIFICANT CHANGE UP (ref 80–100)
MONOCYTES # BLD AUTO: 1.14 K/UL — HIGH (ref 0–0.9)
MONOCYTES NFR BLD AUTO: 12 % — SIGNIFICANT CHANGE UP (ref 2–14)
NEUTROPHILS # BLD AUTO: 5.06 K/UL — SIGNIFICANT CHANGE UP (ref 1.8–7.4)
NEUTROPHILS NFR BLD AUTO: 53.1 % — SIGNIFICANT CHANGE UP (ref 43–77)
NRBC # FLD: 0 — SIGNIFICANT CHANGE UP
PLATELET # BLD AUTO: 260 K/UL — SIGNIFICANT CHANGE UP (ref 150–400)
PMV BLD: 10.4 FL — SIGNIFICANT CHANGE UP (ref 7–13)
POTASSIUM SERPL-MCNC: 4.5 MMOL/L — SIGNIFICANT CHANGE UP (ref 3.5–5.3)
POTASSIUM SERPL-SCNC: 4.5 MMOL/L — SIGNIFICANT CHANGE UP (ref 3.5–5.3)
PROT SERPL-MCNC: 7 G/DL — SIGNIFICANT CHANGE UP (ref 6–8.3)
RBC # BLD: 3.02 M/UL — LOW (ref 3.8–5.2)
RBC # FLD: 17.3 % — HIGH (ref 10.3–14.5)
SODIUM SERPL-SCNC: 133 MMOL/L — LOW (ref 135–145)
WBC # BLD: 9.52 K/UL — SIGNIFICANT CHANGE UP (ref 3.8–10.5)
WBC # FLD AUTO: 9.52 K/UL — SIGNIFICANT CHANGE UP (ref 3.8–10.5)

## 2018-11-21 RX ORDER — ERYTHROPOIETIN 10000 [IU]/ML
10000 INJECTION, SOLUTION INTRAVENOUS; SUBCUTANEOUS ONCE
Qty: 0 | Refills: 0 | Status: COMPLETED | OUTPATIENT
Start: 2018-11-21 | End: 2018-11-21

## 2018-11-21 RX ADMIN — Medication 1 TABLET(S): at 12:45

## 2018-11-21 RX ADMIN — Medication 50 MILLIGRAM(S): at 05:52

## 2018-11-21 RX ADMIN — Medication 60 MILLIGRAM(S): at 05:52

## 2018-11-21 RX ADMIN — CHLORHEXIDINE GLUCONATE 1 APPLICATION(S): 213 SOLUTION TOPICAL at 22:02

## 2018-11-21 RX ADMIN — CHLORHEXIDINE GLUCONATE 1 APPLICATION(S): 213 SOLUTION TOPICAL at 05:46

## 2018-11-21 RX ADMIN — PANTOPRAZOLE SODIUM 40 MILLIGRAM(S): 20 TABLET, DELAYED RELEASE ORAL at 05:46

## 2018-11-21 RX ADMIN — GABAPENTIN 300 MILLIGRAM(S): 400 CAPSULE ORAL at 05:46

## 2018-11-21 RX ADMIN — ERYTHROPOIETIN 10000 UNIT(S): 10000 INJECTION, SOLUTION INTRAVENOUS; SUBCUTANEOUS at 17:50

## 2018-11-21 RX ADMIN — ATORVASTATIN CALCIUM 80 MILLIGRAM(S): 80 TABLET, FILM COATED ORAL at 22:02

## 2018-11-21 RX ADMIN — ERTAPENEM SODIUM 100 MILLIGRAM(S): 1 INJECTION, POWDER, LYOPHILIZED, FOR SOLUTION INTRAMUSCULAR; INTRAVENOUS at 01:10

## 2018-11-21 RX ADMIN — Medication 50 MILLIGRAM(S): at 22:02

## 2018-11-21 RX ADMIN — GABAPENTIN 300 MILLIGRAM(S): 400 CAPSULE ORAL at 23:36

## 2018-11-21 RX ADMIN — ISOSORBIDE MONONITRATE 30 MILLIGRAM(S): 60 TABLET, EXTENDED RELEASE ORAL at 12:45

## 2018-11-21 NOTE — PROGRESS NOTE ADULT - SUBJECTIVE AND OBJECTIVE BOX
Chief complaint  Patient is a 68y old  Female who presents with a chief complaint of Transfer for higher level of care (20 Nov 2018 16:31)   Review of systems  Patient in bed, looks comfortable, no fever,  no hypoglycemia.    Labs and Fingersticks  CAPILLARY BLOOD GLUCOSE      POCT Blood Glucose.: 74 mg/dL (21 Nov 2018 06:51)  POCT Blood Glucose.: 95 mg/dL (21 Nov 2018 02:25)  POCT Blood Glucose.: 123 mg/dL (20 Nov 2018 22:45)  POCT Blood Glucose.: 159 mg/dL (20 Nov 2018 17:25)  POCT Blood Glucose.: 125 mg/dL (20 Nov 2018 13:36)  POCT Blood Glucose.: 47 mg/dL (20 Nov 2018 13:00)  POCT Blood Glucose.: 41 mg/dL (20 Nov 2018 12:40)          Calcium, Total Serum: 8.5 (11-21 @ 05:50)  Calcium, Total Serum: 8.7 (11-20 @ 06:15)  Albumin, Serum: 2.3 <L> (11-21 @ 05:50)    Alanine Aminotransferase (ALT/SGPT): 15 (11-21 @ 05:50)  Alkaline Phosphatase, Serum: 300 <H> (11-21 @ 05:50)  Aspartate Aminotransferase (AST/SGOT): 27 (11-21 @ 05:50)        11-21    133<L>  |  96<L>  |  17  ----------------------------<  70  4.5   |  27  |  4.86<H>    Ca    8.5      21 Nov 2018 05:50  Phos  2.7     11-20  Mg     2.0     11-20    TPro  7.0  /  Alb  2.3<L>  /  TBili  0.7  /  DBili  x   /  AST  27  /  ALT  15  /  AlkPhos  300<H>  11-21                        8.4    9.52  )-----------( 260      ( 21 Nov 2018 05:50 )             28.1     Medications  MEDICATIONS  (STANDING):  atorvastatin 80 milliGRAM(s) Oral at bedtime  calcium carbonate 1250 mG  + Vitamin D (OsCal 500 + D) 1 Tablet(s) Oral daily  chlorhexidine 4% Liquid 1 Application(s) Topical two times a day  dextrose 5%. 1000 milliLiter(s) (50 mL/Hr) IV Continuous <Continuous>  dextrose 50% Injectable 12.5 Gram(s) IV Push once  docusate sodium 100 milliGRAM(s) Oral daily  epoetin analilia Injectable 88453 Unit(s) IV Push <User Schedule>  ertapenem  IVPB 500 milliGRAM(s) IV Intermittent every 24 hours  gabapentin 300 milliGRAM(s) Oral two times a day  insulin lispro (HumaLOG) corrective regimen sliding scale   SubCutaneous three times a day before meals  isosorbide   mononitrate ER Tablet (IMDUR) 30 milliGRAM(s) Oral daily  metoprolol tartrate 50 milliGRAM(s) Oral two times a day  NIFEdipine XL 60 milliGRAM(s) Oral daily  pantoprazole    Tablet 40 milliGRAM(s) Oral before breakfast  senna 2 Tablet(s) Oral at bedtime      Physical Exam  General: Patient comfortable in bed  Vital Signs Last 12 Hrs  T(F): 98.8 (11-21-18 @ 05:44), Max: 98.8 (11-21-18 @ 05:44)  HR: 80 (11-21-18 @ 05:44) (80 - 80)  BP: 155/70 (11-21-18 @ 05:44) (155/70 - 155/70)  BP(mean): --  RR: 18 (11-21-18 @ 05:44) (18 - 18)  SpO2: 98% (11-21-18 @ 05:44) (98% - 98%)  Neck: No palpable thyroid nodules.  CVS: S1S2, No murmurs  Respiratory: No wheezing, no crepitations  GI: Abdomen soft, bowel sounds positive  Musculoskeletal:  edema lower extremities.   Skin: No skin rashes, no ecchymosis    Diagnostics

## 2018-11-21 NOTE — PROGRESS NOTE ADULT - SUBJECTIVE AND OBJECTIVE BOX
[facility], Division of Infectious Diseases  DEB Barb[facility] A. Lee  [phone]    Name: IRENE TAYLOR  Age: 68y  Gender: Female  MRN: [id number]    Interval History--  Notes reviewed-- seen at HD  pt feels well , thought she would be discharged today    Past Medical History--  ESRD (end stage renal disease) on dialysis  CAD (coronary artery disease)  Diabetes  CRF (chronic renal failure)  Hypertension  Pneumonia  Myocardial infarction  Hypertension  Diabetes  H/O: hysterectomy      For details regarding the patient's social history, family history, and other miscellaneous elements, please refer the initial infectious diseases consultation and/or the admitting history and physical examination for this admission.    Allergies    No Known Drug Allergies  Purell (Rash)    Intolerances        Medications--  Antibiotics:    Immunologic:  epoetin analilia Injectable 49736 Unit(s) IV Push <User Schedule>    Other:  acetaminophen   Tablet .. PRN  atorvastatin  calcium carbonate 1250 mG  + Vitamin D (OsCal 500 + D)  chlorhexidine 4% Liquid  dextrose 40% Gel PRN  dextrose 5%.  dextrose 50% Injectable  docusate sodium  gabapentin  glucagon  Injectable PRN  insulin lispro (HumaLOG) corrective regimen sliding scale  isosorbide   mononitrate ER Tablet (IMDUR)  metoprolol tartrate  NIFEdipine XL  pantoprazole    Tablet  senna      Review of Systems--  A 10-point review of systems was obtained.     Pertinent positives and negatives--  Constitutional: No fevers. No Chills. No Rigors.   Cardiovascular: No chest pain. No palpitations.  Respiratory: No shortness of breath. No cough.  Gastrointestinal: No nausea or vomiting. No diarrhea or constipation.   Psychiatric: no depression    Review of systems otherwise negative except as previously noted.    Physical Examination--  Vital Signs: T(F): 98.2 (11-21-18 @ 12:38), Max: 99.5 (11-20-18 @ 22:30)  HR: 73 (11-21-18 @ 12:38)  BP: 166/75 (11-21-18 @ 12:38)  RR: 17 (11-21-18 @ 12:38)  SpO2: 99% (11-21-18 @ 12:38)  Wt(kg): --  General: Nontoxic-appearing Female in no acute distress.  HEENT: AT/NC. . Anicteric. Conjunctiva pink and moist. Oropharynx clear. Dentition fair.  Neck: Not rigid. No sense of mass.  Nodes: None palpable.  Lungs: Clear bilaterally without rales, wheezing or rhonchi  Heart: Regular rate and rhythm. No Murmur. right shiley cath  Abdomen: Bowel sounds present and normoactive. Soft. Nondistended. Nontender  Extremities: No cyanosis or clubbing. trace edema.   Skin: Warm. Dry. Good turgor. No rash. No vasculitic stigmata.  Psychiatric: Appropriate affect and mood for situation.         Laboratory Studies--  CBC                        8.4    9.52  )-----------( 260      ( 21 Nov 2018 05:50 )             28.1       Chemistries  11-21    133<L>  |  96<L>  |  17  ----------------------------<  70  4.5   |  27  |  4.86<H>    Ca    8.5      21 Nov 2018 05:50  Phos  2.7     11-20  Mg     2.0     11-20    TPro  7.0  /  Alb  2.3<L>  /  TBili  0.7  /  DBili  x   /  AST  27  /  ALT  15  /  AlkPhos  300<H>  11-21      Culture Data    Culture - Blood (collected 18 Nov 2018 01:49)  Source: BLOOD PERIPHERAL  Preliminary Report (21 Nov 2018 01:48):    NO ORGANISMS ISOLATED    NO ORGANISMS ISOLATED AT 72 HRS.    Culture - Blood (collected 18 Nov 2018 01:49)  Source: BLOOD  Preliminary Report (21 Nov 2018 01:48):    NO ORGANISMS ISOLATED    NO ORGANISMS ISOLATED AT 72 HRS.

## 2018-11-21 NOTE — PROGRESS NOTE ADULT - PROBLEM SELECTOR PLAN 2
s/p drainage and tube removal by IR at V  s/p ERCP with stone removal CBD stent placement 11/19/2018  cont abx per id recommendations re: duration  outpt fu for repeat ERCP/stent removal per house GI w/Dr. Lim [phone]  tolerating po intake

## 2018-11-21 NOTE — PROGRESS NOTE ADULT - SUBJECTIVE AND OBJECTIVE BOX
Patient is a 68y old  Female who presents with a chief complaint of Transfer for higher level of care (21 Nov 2018 17:06)      SUBJECTIVE / OVERNIGHT EVENTS:  Hypoglycemia is improving now.  No GI symptoms.  Review of Systems:   CONSTITUTIONAL: No fever, weight loss, or fatigue  EYES: No eye pain, visual disturbances, or discharge  ENMT:  No difficulty hearing, tinnitus, vertigo; No sinus or throat pain  NECK: No pain or stiffness  BREASTS: No pain, masses, or nipple discharge  RESPIRATORY: No cough, wheezing, chills or hemoptysis; No shortness of breath  CARDIOVASCULAR: No chest pain, palpitations, dizziness, or leg swelling  GASTROINTESTINAL: No abdominal or epigastric pain. No nausea, vomiting, or hematemesis; No diarrhea or constipation. No melena or hematochezia.  GENITOURINARY: No dysuria, frequency, hematuria, or incontinence  NEUROLOGICAL: No headaches, memory loss, loss of strength, numbness, or tremors  SKIN: No itching, burning, rashes, or lesions   LYMPH NODES: No enlarged glands  ENDOCRINE: No heat or cold intolerance; No hair loss  MUSCULOSKELETAL: No joint pain or swelling; No muscle, back, or extremity pain  PSYCHIATRIC: No depression, anxiety, mood swings, or difficulty sleeping  HEME/LYMPH: No easy bruising, or bleeding gums  ALLERY AND IMMUNOLOGIC: No hives or eczema    MEDICATIONS  (STANDING):  atorvastatin 80 milliGRAM(s) Oral at bedtime  calcium carbonate 1250 mG  + Vitamin D (OsCal 500 + D) 1 Tablet(s) Oral daily  chlorhexidine 4% Liquid 1 Application(s) Topical two times a day  dextrose 5%. 1000 milliLiter(s) (50 mL/Hr) IV Continuous <Continuous>  dextrose 50% Injectable 12.5 Gram(s) IV Push once  docusate sodium 100 milliGRAM(s) Oral daily  epoetin analilia Injectable 88274 Unit(s) IV Push <User Schedule>  gabapentin 300 milliGRAM(s) Oral two times a day  insulin lispro (HumaLOG) corrective regimen sliding scale   SubCutaneous three times a day before meals  isosorbide   mononitrate ER Tablet (IMDUR) 30 milliGRAM(s) Oral daily  metoprolol tartrate 50 milliGRAM(s) Oral two times a day  NIFEdipine XL 60 milliGRAM(s) Oral daily  pantoprazole    Tablet 40 milliGRAM(s) Oral before breakfast  senna 2 Tablet(s) Oral at bedtime    MEDICATIONS  (PRN):  acetaminophen   Tablet .. 650 milliGRAM(s) Oral every 6 hours PRN Temp greater or equal to 38C (100.4F)  dextrose 40% Gel 15 Gram(s) Oral once PRN Blood Glucose LESS THAN 70 milliGRAM(s)/deciliter  glucagon  Injectable 1 milliGRAM(s) IntraMuscular once PRN Glucose LESS THAN 70 milligrams/deciliter      PHYSICAL EXAM:  Vital Signs Last 24 Hrs  T(C): 36.7 (21 Nov 2018 16:30), Max: 37.5 (20 Nov 2018 22:30)  T(F): 98.1 (21 Nov 2018 16:30), Max: 99.5 (20 Nov 2018 22:30)  HR: 78 (21 Nov 2018 16:30) (72 - 80)  BP: 176/81 (21 Nov 2018 16:30) (155/70 - 176/81)  BP(mean): --  RR: 18 (21 Nov 2018 16:30) (17 - 18)  SpO2: 99% (21 Nov 2018 12:38) (98% - 99%)  I&O's Summary    GENERAL: NAD, well-developed  HEAD:  Atraumatic, Normocephalic  EYES: EOMI, PERRLA, conjunctiva and sclera clear  NECK: Supple, No JVD  CHEST/LUNG: Clear to auscultation bilaterally; No wheeze  HEART: Regular rate and rhythm; No murmurs, rubs, or gallops  ABDOMEN: Soft, Nontender, Nondistended; Bowel sounds present  EXTREMITIES:  2+ Peripheral Pulses, No clubbing, cyanosis, or edema  PSYCH: AAOx3  NEUROLOGY: non-focal  SKIN: No rashes or lesions    LABS:  CAPILLARY BLOOD GLUCOSE      POCT Blood Glucose.: 134 mg/dL (21 Nov 2018 17:45)  POCT Blood Glucose.: 115 mg/dL (21 Nov 2018 12:15)  POCT Blood Glucose.: 74 mg/dL (21 Nov 2018 06:51)  POCT Blood Glucose.: 95 mg/dL (21 Nov 2018 02:25)  POCT Blood Glucose.: 123 mg/dL (20 Nov 2018 22:45)                          8.4    9.52  )-----------( 260      ( 21 Nov 2018 05:50 )             28.1     11-21    133<L>  |  96<L>  |  17  ----------------------------<  70  4.5   |  27  |  4.86<H>    Ca    8.5      21 Nov 2018 05:50  Phos  2.7     11-20  Mg     2.0     11-20    TPro  7.0  /  Alb  2.3<L>  /  TBili  0.7  /  DBili  x   /  AST  27  /  ALT  15  /  AlkPhos  300<H>  11-21              RADIOLOGY & ADDITIONAL TESTS:    Imaging Personally Reviewed:    Consultant(s) Notes Reviewed:      Care Discussed with Consultants/Other Providers:

## 2018-11-21 NOTE — PROGRESS NOTE ADULT - PROBLEM SELECTOR PLAN 2
relief of obstruction the key element,  abx adjunctive  wbc decreased  11/18 blood cx neg  afebrile  can now d/c ertapenem

## 2018-11-21 NOTE — PROGRESS NOTE ADULT - SUBJECTIVE AND OBJECTIVE BOX
INTERVAL HPI/OVERNIGHT EVENTS:    pt seen with family bedside this morning  no abdominal pain   no n/v  wishing to go home      MEDICATIONS  (STANDING):  atorvastatin 80 milliGRAM(s) Oral at bedtime  calcium carbonate 1250 mG  + Vitamin D (OsCal 500 + D) 1 Tablet(s) Oral daily  chlorhexidine 4% Liquid 1 Application(s) Topical two times a day  dextrose 5%. 1000 milliLiter(s) (50 mL/Hr) IV Continuous <Continuous>  dextrose 50% Injectable 12.5 Gram(s) IV Push once  docusate sodium 100 milliGRAM(s) Oral daily  epoetin analilia Injectable 63626 Unit(s) IV Push <User Schedule>  ertapenem  IVPB 500 milliGRAM(s) IV Intermittent every 24 hours  gabapentin 300 milliGRAM(s) Oral two times a day  insulin lispro (HumaLOG) corrective regimen sliding scale   SubCutaneous three times a day before meals  isosorbide   mononitrate ER Tablet (IMDUR) 30 milliGRAM(s) Oral daily  metoprolol tartrate 50 milliGRAM(s) Oral two times a day  NIFEdipine XL 60 milliGRAM(s) Oral daily  pantoprazole    Tablet 40 milliGRAM(s) Oral before breakfast  senna 2 Tablet(s) Oral at bedtime    MEDICATIONS  (PRN):  acetaminophen   Tablet .. 650 milliGRAM(s) Oral every 6 hours PRN Temp greater or equal to 38C (100.4F)  dextrose 40% Gel 15 Gram(s) Oral once PRN Blood Glucose LESS THAN 70 milliGRAM(s)/deciliter  glucagon  Injectable 1 milliGRAM(s) IntraMuscular once PRN Glucose LESS THAN 70 milligrams/deciliter      Allergies    No Known Drug Allergies  Purell (Rash)    Intolerances        Review of Systems:    General:  No wt loss, fevers, chills, night sweats, fatigue   Eyes:  Good vision, no reported pain  ENT:  No sore throat, pain, runny nose, dysphagia  CV:  No pain, palpitations, hypo/hypertension  Resp:  No dyspnea, cough, tachypnea, wheezing  GI:  No pain, No nausea, No vomiting, No diarrhea, No constipation, No weight loss, No fever, No pruritis, No rectal bleeding, No melena, No dysphagia  :  No pain, bleeding, incontinence, nocturia  Muscle:  No pain, weakness  Neuro:  No weakness, tingling, memory problems  Psych:  No fatigue, insomnia, mood problems, depression  Endocrine:  No polyuria, polydypsia, cold/heat intolerance  Heme:  No petechiae, ecchymosis, easy bruisability  Skin:  No rash, tattoos, scars, edema      Vital Signs Last 24 Hrs  T(C): 37.1 (21 Nov 2018 05:44), Max: 37.5 (20 Nov 2018 22:30)  T(F): 98.8 (21 Nov 2018 05:44), Max: 99.5 (20 Nov 2018 22:30)  HR: 80 (21 Nov 2018 05:44) (69 - 80)  BP: 155/70 (21 Nov 2018 05:44) (135/56 - 156/62)  BP(mean): --  RR: 18 (21 Nov 2018 05:44) (17 - 18)  SpO2: 98% (21 Nov 2018 05:44) (96% - 98%)    PHYSICAL EXAM:    Constitutional: NAD  HEENT: EOMI, throat clear  Neck: No LAD, supple  Respiratory: CTA and P  Cardiovascular: S1 and S2, RRR, no M  Gastrointestinal: BS+, soft, NT/ND, neg HSM,  Extremities: No peripheral edema, neg clubbing, cyanosis  Vascular: 2+ peripheral pulses  Neurological: A/O x 3, no focal deficits  Psychiatric: Normal mood, normal affect  Skin: No rashes      LABS:                        8.4    9.52  )-----------( 260      ( 21 Nov 2018 05:50 )             28.1     11-21    133<L>  |  96<L>  |  17  ----------------------------<  70  4.5   |  27  |  4.86<H>    Ca    8.5      21 Nov 2018 05:50  Phos  2.7     11-20  Mg     2.0     11-20    TPro  7.0  /  Alb  2.3<L>  /  TBili  0.7  /  DBili  x   /  AST  27  /  ALT  15  /  AlkPhos  300<H>  11-21          RADIOLOGY & ADDITIONAL TESTS:

## 2018-11-21 NOTE — PROGRESS NOTE ADULT - SUBJECTIVE AND OBJECTIVE BOX
Pt seen and examined at bedside  No complaints, Denies N/V. TOlerating meals.     Allergies:  No Known Drug Allergies  Purell (Rash)    Hospital Medications:   MEDICATIONS  (STANDING):  atorvastatin 80 milliGRAM(s) Oral at bedtime  calcium carbonate 1250 mG  + Vitamin D (OsCal 500 + D) 1 Tablet(s) Oral daily  chlorhexidine 4% Liquid 1 Application(s) Topical two times a day  dextrose 5%. 1000 milliLiter(s) (50 mL/Hr) IV Continuous <Continuous>  dextrose 50% Injectable 12.5 Gram(s) IV Push once  docusate sodium 100 milliGRAM(s) Oral daily  epoetin analilia Injectable 93372 Unit(s) IV Push <User Schedule>  ertapenem  IVPB 500 milliGRAM(s) IV Intermittent every 24 hours  gabapentin 300 milliGRAM(s) Oral two times a day  insulin lispro (HumaLOG) corrective regimen sliding scale   SubCutaneous three times a day before meals  isosorbide   mononitrate ER Tablet (IMDUR) 30 milliGRAM(s) Oral daily  metoprolol tartrate 50 milliGRAM(s) Oral two times a day  NIFEdipine XL 60 milliGRAM(s) Oral daily  pantoprazole    Tablet 40 milliGRAM(s) Oral before breakfast  senna 2 Tablet(s) Oral at bedtime    VITALS:  T(F): 98.8 (11-21-18 @ 05:44), Max: 99.5 (11-20-18 @ 22:30)  HR: 80 (11-21-18 @ 05:44)  BP: 155/70 (11-21-18 @ 05:44)  RR: 18 (11-21-18 @ 05:44)  SpO2: 98% (11-21-18 @ 05:44)  Wt(kg): --      PHYSICAL EXAM:  Constitutional: NAD  HEENT: anicteric sclera, oropharynx clear, MMM  Neck: No JVD  Respiratory: CTAB, no wheezes, rales or rhonchi  Cardiovascular: S1, S2, RRR  Gastrointestinal: BS+, soft, NT/ND  Extremities: No cyanosis or clubbing. No peripheral edema  Neurological: A/O x 3, no focal deficits  Psychiatric: Normal mood, normal affect  : No CVA tenderness. No aguilar.   Skin: No rashes  Vascular Access: [facility] tunneled cath     LABS:  11-21    133<L>  |  96<L>  |  17  ----------------------------<  70  4.5   |  27  |  4.86<H>    Ca    8.5      21 Nov 2018 05:50  Phos  2.7     11-20  Mg     2.0     11-20    TPro  7.0  /  Alb  2.3<L>  /  TBili  0.7  /  DBili      /  AST  27  /  ALT  15  /  AlkPhos  300<H>  11-21    Creatinine Trend: 4.86 <--, 3.39 <--, 5.26 <--, 2.98 <--, 4.50 <--, 3.70 <--, 7.20 <--                        8.4    9.52  )-----------( 260      ( 21 Nov 2018 05:50 )             28.1     Urine Studies:      RADIOLOGY & ADDITIONAL STUDIES:

## 2018-11-22 RX ADMIN — Medication 2: at 08:54

## 2018-11-22 RX ADMIN — Medication 50 MILLIGRAM(S): at 06:02

## 2018-11-22 RX ADMIN — GABAPENTIN 300 MILLIGRAM(S): 400 CAPSULE ORAL at 17:57

## 2018-11-22 RX ADMIN — Medication 2: at 17:56

## 2018-11-22 RX ADMIN — PANTOPRAZOLE SODIUM 40 MILLIGRAM(S): 20 TABLET, DELAYED RELEASE ORAL at 06:02

## 2018-11-22 RX ADMIN — Medication 1 TABLET(S): at 12:53

## 2018-11-22 RX ADMIN — CHLORHEXIDINE GLUCONATE 1 APPLICATION(S): 213 SOLUTION TOPICAL at 06:02

## 2018-11-22 RX ADMIN — Medication 50 MILLIGRAM(S): at 17:57

## 2018-11-22 RX ADMIN — CHLORHEXIDINE GLUCONATE 1 APPLICATION(S): 213 SOLUTION TOPICAL at 17:57

## 2018-11-22 RX ADMIN — Medication 2: at 12:53

## 2018-11-22 RX ADMIN — ISOSORBIDE MONONITRATE 30 MILLIGRAM(S): 60 TABLET, EXTENDED RELEASE ORAL at 12:53

## 2018-11-22 RX ADMIN — GABAPENTIN 300 MILLIGRAM(S): 400 CAPSULE ORAL at 06:02

## 2018-11-22 RX ADMIN — Medication 100 MILLIGRAM(S): at 12:53

## 2018-11-22 RX ADMIN — Medication 60 MILLIGRAM(S): at 06:02

## 2018-11-22 RX ADMIN — ATORVASTATIN CALCIUM 80 MILLIGRAM(S): 80 TABLET, FILM COATED ORAL at 21:50

## 2018-11-22 NOTE — PROGRESS NOTE ADULT - ATTENDING COMMENTS
d/w family at bedside
D/W family at bedside. All Q's answered to the best of my ability    Ed Solis MD  [phone]
ID sign off  please call with questions  [phone]
PT eval, DC planning in AM

## 2018-11-22 NOTE — PROGRESS NOTE ADULT - SUBJECTIVE AND OBJECTIVE BOX
Patient is a 68y old  Female who presents with a chief complaint of Transfer for higher level of care (22 Nov 2018 13:42)      SUBJECTIVE / OVERNIGHT EVENTS:  Afebrile, no new symptoms. No GI complaints  Review of Systems:   CONSTITUTIONAL: No fever, weight loss, or fatigue  EYES: No eye pain, visual disturbances, or discharge  ENMT:  No difficulty hearing, tinnitus, vertigo; No sinus or throat pain  NECK: No pain or stiffness  BREASTS: No pain, masses, or nipple discharge  RESPIRATORY: No cough, wheezing, chills or hemoptysis; No shortness of breath  CARDIOVASCULAR: No chest pain, palpitations, dizziness, or leg swelling  GASTROINTESTINAL: No abdominal or epigastric pain. No nausea, vomiting, or hematemesis; No diarrhea or constipation. No melena or hematochezia.  GENITOURINARY: No dysuria, frequency, hematuria, or incontinence  NEUROLOGICAL: No headaches, memory loss, loss of strength, numbness, or tremors  SKIN: No itching, burning, rashes, or lesions   LYMPH NODES: No enlarged glands  ENDOCRINE: No heat or cold intolerance; No hair loss  MUSCULOSKELETAL: No joint pain or swelling; No muscle, back, or extremity pain  PSYCHIATRIC: No depression, anxiety, mood swings, or difficulty sleeping  HEME/LYMPH: No easy bruising, or bleeding gums  ALLERY AND IMMUNOLOGIC: No hives or eczema    MEDICATIONS  (STANDING):  atorvastatin 80 milliGRAM(s) Oral at bedtime  calcium carbonate 1250 mG  + Vitamin D (OsCal 500 + D) 1 Tablet(s) Oral daily  chlorhexidine 4% Liquid 1 Application(s) Topical two times a day  dextrose 5%. 1000 milliLiter(s) (50 mL/Hr) IV Continuous <Continuous>  dextrose 50% Injectable 12.5 Gram(s) IV Push once  docusate sodium 100 milliGRAM(s) Oral daily  epoetin analilia Injectable 58778 Unit(s) IV Push <User Schedule>  gabapentin 300 milliGRAM(s) Oral two times a day  insulin lispro (HumaLOG) corrective regimen sliding scale   SubCutaneous three times a day before meals  isosorbide   mononitrate ER Tablet (IMDUR) 30 milliGRAM(s) Oral daily  metoprolol tartrate 50 milliGRAM(s) Oral two times a day  NIFEdipine XL 60 milliGRAM(s) Oral daily  pantoprazole    Tablet 40 milliGRAM(s) Oral before breakfast  senna 2 Tablet(s) Oral at bedtime    MEDICATIONS  (PRN):  acetaminophen   Tablet .. 650 milliGRAM(s) Oral every 6 hours PRN Temp greater or equal to 38C (100.4F)  dextrose 40% Gel 15 Gram(s) Oral once PRN Blood Glucose LESS THAN 70 milliGRAM(s)/deciliter  glucagon  Injectable 1 milliGRAM(s) IntraMuscular once PRN Glucose LESS THAN 70 milligrams/deciliter      PHYSICAL EXAM:  Vital Signs Last 24 Hrs  T(C): 36.8 (22 Nov 2018 13:45), Max: 37.4 (22 Nov 2018 05:59)  T(F): 98.2 (22 Nov 2018 13:45), Max: 99.4 (22 Nov 2018 05:59)  HR: 77 (22 Nov 2018 13:45) (74 - 79)  BP: 173/66 (22 Nov 2018 13:45) (141/51 - 176/81)  BP(mean): --  RR: 17 (22 Nov 2018 13:45) (17 - 18)  SpO2: 95% (22 Nov 2018 13:45) (95% - 99%)  I&O's Summary    21 Nov 2018 07:01  -  22 Nov 2018 07:00  --------------------------------------------------------  IN: 400 mL / OUT: 1800 mL / NET: -1400 mL      GENERAL: NAD, well-developed  HEAD:  Atraumatic, Normocephalic  EYES: EOMI, PERRLA, conjunctiva and sclera clear  NECK: Supple, No JVD  CHEST/LUNG: Clear to auscultation bilaterally; No wheeze  HEART: Regular rate and rhythm; No murmurs, rubs, or gallops  ABDOMEN: Soft, Nontender, Nondistended; Bowel sounds present  EXTREMITIES:  2+ Peripheral Pulses, No clubbing, cyanosis, or edema  PSYCH: AAOx3  NEUROLOGY: non-focal  SKIN: No rashes or lesions    LABS:  CAPILLARY BLOOD GLUCOSE      POCT Blood Glucose.: 160 mg/dL (22 Nov 2018 11:55)  POCT Blood Glucose.: 152 mg/dL (22 Nov 2018 08:29)  POCT Blood Glucose.: 130 mg/dL (21 Nov 2018 22:48)  POCT Blood Glucose.: 134 mg/dL (21 Nov 2018 17:45)                          8.4    9.52  )-----------( 260      ( 21 Nov 2018 05:50 )             28.1     11-21    133<L>  |  96<L>  |  17  ----------------------------<  70  4.5   |  27  |  4.86<H>    Ca    8.5      21 Nov 2018 05:50    TPro  7.0  /  Alb  2.3<L>  /  TBili  0.7  /  DBili  x   /  AST  27  /  ALT  15  /  AlkPhos  300<H>  11-21              RADIOLOGY & ADDITIONAL TESTS:    Imaging Personally Reviewed:    Consultant(s) Notes Reviewed:      Care Discussed with Consultants/Other Providers:

## 2018-11-22 NOTE — PROGRESS NOTE ADULT - SUBJECTIVE AND OBJECTIVE BOX
INTERVAL HPI/OVERNIGHT EVENTS:    feeling well   having bowel movements  denies n/v/d/c, abdominal pain, melena or brbpr   wants to go home    MEDICATIONS  (STANDING):  atorvastatin 80 milliGRAM(s) Oral at bedtime  calcium carbonate 1250 mG  + Vitamin D (OsCal 500 + D) 1 Tablet(s) Oral daily  chlorhexidine 4% Liquid 1 Application(s) Topical two times a day  dextrose 5%. 1000 milliLiter(s) (50 mL/Hr) IV Continuous <Continuous>  dextrose 50% Injectable 12.5 Gram(s) IV Push once  docusate sodium 100 milliGRAM(s) Oral daily  epoetin analilia Injectable 04376 Unit(s) IV Push <User Schedule>  gabapentin 300 milliGRAM(s) Oral two times a day  insulin lispro (HumaLOG) corrective regimen sliding scale   SubCutaneous three times a day before meals  isosorbide   mononitrate ER Tablet (IMDUR) 30 milliGRAM(s) Oral daily  metoprolol tartrate 50 milliGRAM(s) Oral two times a day  NIFEdipine XL 60 milliGRAM(s) Oral daily  pantoprazole    Tablet 40 milliGRAM(s) Oral before breakfast  senna 2 Tablet(s) Oral at bedtime    MEDICATIONS  (PRN):  acetaminophen   Tablet .. 650 milliGRAM(s) Oral every 6 hours PRN Temp greater or equal to 38C (100.4F)  dextrose 40% Gel 15 Gram(s) Oral once PRN Blood Glucose LESS THAN 70 milliGRAM(s)/deciliter  glucagon  Injectable 1 milliGRAM(s) IntraMuscular once PRN Glucose LESS THAN 70 milligrams/deciliter      Allergies    No Known Drug Allergies  Purell (Rash)    Intolerances        Review of Systems:    General:  No wt loss, fevers, chills, night sweats, fatigue   Eyes:  Good vision, no reported pain  ENT:  No sore throat, pain, runny nose, dysphagia  CV:  No pain, palpitations, hypo/hypertension  Resp:  No dyspnea, cough, tachypnea, wheezing  GI:  No pain, No nausea, No vomiting, No diarrhea, No constipation, No weight loss, No fever, No pruritis, No rectal bleeding, No melena, No dysphagia  :  No pain, bleeding, incontinence, nocturia  Muscle:  No pain, weakness  Neuro:  No weakness, tingling, memory problems  Psych:  No fatigue, insomnia, mood problems, depression  Endocrine:  No polyuria, polydypsia, cold/heat intolerance  Heme:  No petechiae, ecchymosis, easy bruisability  Skin:  No rash, tattoos, scars, edema      Vital Signs Last 24 Hrs  T(C): 37.4 (22 Nov 2018 05:59), Max: 37.4 (22 Nov 2018 05:59)  T(F): 99.4 (22 Nov 2018 05:59), Max: 99.4 (22 Nov 2018 05:59)  HR: 75 (22 Nov 2018 05:59) (73 - 79)  BP: 153/59 (22 Nov 2018 05:59) (141/51 - 176/81)  BP(mean): --  RR: 18 (22 Nov 2018 05:59) (17 - 18)  SpO2: 98% (22 Nov 2018 05:59) (98% - 99%)    PHYSICAL EXAM:    Constitutional: NAD  HEENT: EOMI, throat clear  Neck: No LAD, supple  Respiratory: CTA and P  Cardiovascular: S1 and S2, RRR, no M  Gastrointestinal: BS+, soft, NT/ND, neg HSM,  Extremities: No peripheral edema, neg clubbing, cyanosis  Vascular: 2+ peripheral pulses  Neurological: A/O x 3, no focal deficits  Psychiatric: Normal mood, normal affect  Skin: No rashes      LABS:                        8.4    9.52  )-----------( 260      ( 21 Nov 2018 05:50 )             28.1     11-21    133<L>  |  96<L>  |  17  ----------------------------<  70  4.5   |  27  |  4.86<H>    Ca    8.5      21 Nov 2018 05:50    TPro  7.0  /  Alb  2.3<L>  /  TBili  0.7  /  DBili  x   /  AST  27  /  ALT  15  /  AlkPhos  300<H>  11-21          RADIOLOGY & ADDITIONAL TESTS:

## 2018-11-22 NOTE — PROGRESS NOTE ADULT - SUBJECTIVE AND OBJECTIVE BOX
Nephrology Followup Note - [phone] - Dr Owusu / Dr Herzog / Dr Flanagan / Dr Madsen / Dr Mosher / Dr Leon / Dr Vogt / Dr Hunter  Pt seen and examined at bedside  No new complaints today, Feeling well, denies pain.     Allergies:  No Known Drug Allergies  Purell (Rash)    Hospital Medications:   MEDICATIONS  (STANDING):  atorvastatin 80 milliGRAM(s) Oral at bedtime  calcium carbonate 1250 mG  + Vitamin D (OsCal 500 + D) 1 Tablet(s) Oral daily  chlorhexidine 4% Liquid 1 Application(s) Topical two times a day  dextrose 5%. 1000 milliLiter(s) (50 mL/Hr) IV Continuous <Continuous>  dextrose 50% Injectable 12.5 Gram(s) IV Push once  docusate sodium 100 milliGRAM(s) Oral daily  epoetin analilia Injectable 21492 Unit(s) IV Push <User Schedule>  gabapentin 300 milliGRAM(s) Oral two times a day  insulin lispro (HumaLOG) corrective regimen sliding scale   SubCutaneous three times a day before meals  isosorbide   mononitrate ER Tablet (IMDUR) 30 milliGRAM(s) Oral daily  metoprolol tartrate 50 milliGRAM(s) Oral two times a day  NIFEdipine XL 60 milliGRAM(s) Oral daily  pantoprazole    Tablet 40 milliGRAM(s) Oral before breakfast  senna 2 Tablet(s) Oral at bedtime    VITALS:  T(F): 99.4 (11-22-18 @ 05:59), Max: 99.4 (11-22-18 @ 05:59)  HR: 75 (11-22-18 @ 05:59)  BP: 153/59 (11-22-18 @ 05:59)  RR: 18 (11-22-18 @ 05:59)  SpO2: 98% (11-22-18 @ 05:59)  Wt(kg): --    11-21 @ 07:01  -  11-22 @ 07:00  --------------------------------------------------------  IN: 400 mL / OUT: 1800 mL / NET: -1400 mL    PHYSICAL EXAM:  Constitutional: NAD  HEENT: anicteric sclera, oropharynx clear, MMM  Neck: No JVD  Respiratory: CTAB, no wheezes, rales or rhonchi  Cardiovascular: S1, S2, RRR  Gastrointestinal: BS+, soft, NT/ND  Extremities: No cyanosis or clubbing. No peripheral edema  Neurological: A/O x 3, no focal deficits  Psychiatric: Normal mood, normal affect  : No CVA tenderness. No aguilar.   Skin: No rashes  Vascular Access: [facility] tunneled cath     LABS:  11-21    133<L>  |  96<L>  |  17  ----------------------------<  70  4.5   |  27  |  4.86<H>    Ca    8.5      21 Nov 2018 05:50    TPro  7.0  /  Alb  2.3<L>  /  TBili  0.7  /  DBili      /  AST  27  /  ALT  15  /  AlkPhos  300<H>  11-21    Creatinine Trend: 4.86 <--, 3.39 <--, 5.26 <--, 2.98 <--, 4.50 <--, 3.70 <--                        8.4    9.52  )-----------( 260      ( 21 Nov 2018 05:50 )             28.1     Urine Studies:      RADIOLOGY & ADDITIONAL STUDIES:

## 2018-11-22 NOTE — PROGRESS NOTE ADULT - PROBLEM SELECTOR PLAN 2
s/p drainage and tube removal by IR at [facility]  s/p ERCP with stone removal CBD stent placement 11/19/2018  s/p abx   outpt fu for repeat ERCP/stent removal per [facility] w/Dr. Lim [phone]  tolerating po intake

## 2018-11-22 NOTE — PROGRESS NOTE ADULT - SUBJECTIVE AND OBJECTIVE BOX
Chief complaint  Patient is a 68y old  Female who presents with a chief complaint of Transfer for higher level of care (22 Nov 2018 09:18)   Review of systems  Patient in bed, looks comfortable, no fever, no hypoglycemia.    Labs and Fingersticks  CAPILLARY BLOOD GLUCOSE      POCT Blood Glucose.: 160 mg/dL (22 Nov 2018 11:55)  POCT Blood Glucose.: 152 mg/dL (22 Nov 2018 08:29)  POCT Blood Glucose.: 130 mg/dL (21 Nov 2018 22:48)  POCT Blood Glucose.: 134 mg/dL (21 Nov 2018 17:45)          Calcium, Total Serum: 8.5 (11-21 @ 05:50)  Albumin, Serum: 2.3 <L> (11-21 @ 05:50)    Alanine Aminotransferase (ALT/SGPT): 15 (11-21 @ 05:50)  Alkaline Phosphatase, Serum: 300 <H> (11-21 @ 05:50)  Aspartate Aminotransferase (AST/SGOT): 27 (11-21 @ 05:50)        11-21    133<L>  |  96<L>  |  17  ----------------------------<  70  4.5   |  27  |  4.86<H>    Ca    8.5      21 Nov 2018 05:50    TPro  7.0  /  Alb  2.3<L>  /  TBili  0.7  /  DBili  x   /  AST  27  /  ALT  15  /  AlkPhos  300<H>  11-21                        8.4    9.52  )-----------( 260      ( 21 Nov 2018 05:50 )             28.1     Medications  MEDICATIONS  (STANDING):  atorvastatin 80 milliGRAM(s) Oral at bedtime  calcium carbonate 1250 mG  + Vitamin D (OsCal 500 + D) 1 Tablet(s) Oral daily  chlorhexidine 4% Liquid 1 Application(s) Topical two times a day  dextrose 5%. 1000 milliLiter(s) (50 mL/Hr) IV Continuous <Continuous>  dextrose 50% Injectable 12.5 Gram(s) IV Push once  docusate sodium 100 milliGRAM(s) Oral daily  epoetin analilia Injectable 24967 Unit(s) IV Push <User Schedule>  gabapentin 300 milliGRAM(s) Oral two times a day  insulin lispro (HumaLOG) corrective regimen sliding scale   SubCutaneous three times a day before meals  isosorbide   mononitrate ER Tablet (IMDUR) 30 milliGRAM(s) Oral daily  metoprolol tartrate 50 milliGRAM(s) Oral two times a day  NIFEdipine XL 60 milliGRAM(s) Oral daily  pantoprazole    Tablet 40 milliGRAM(s) Oral before breakfast  senna 2 Tablet(s) Oral at bedtime      Physical Exam  General: Patient comfortable in bed  Vital Signs Last 12 Hrs  T(F): 99.4 (11-22-18 @ 05:59), Max: 99.4 (11-22-18 @ 05:59)  HR: 75 (11-22-18 @ 05:59) (75 - 75)  BP: 153/59 (11-22-18 @ 05:59) (153/59 - 153/59)  BP(mean): --  RR: 18 (11-22-18 @ 05:59) (18 - 18)  SpO2: 98% (11-22-18 @ 05:59) (98% - 98%)  Neck: No palpable thyroid nodules.  CVS: S1S2, No murmurs  Respiratory: No wheezing, no crepitations  GI: Abdomen soft, bowel sounds positive  Musculoskeletal:  edema lower extremities.   Skin: No skin rashes, no ecchymosis    Diagnostics

## 2018-11-22 NOTE — PROGRESS NOTE ADULT - NSHPATTENDINGPLANDISCUSS_GEN_ALL_CORE
UBALDO Wells on floor
pt
pt, family bedside
Dr Joyner
Family of patient
PA
Muna and her family

## 2018-11-23 ENCOUNTER — TRANSCRIPTION ENCOUNTER (OUTPATIENT)
Age: 68
End: 2018-11-23

## 2018-11-23 VITALS
OXYGEN SATURATION: 100 % | SYSTOLIC BLOOD PRESSURE: 150 MMHG | RESPIRATION RATE: 18 BRPM | DIASTOLIC BLOOD PRESSURE: 64 MMHG | HEART RATE: 64 BPM | TEMPERATURE: 99 F

## 2018-11-23 LAB
BACTERIA BLD CULT: SIGNIFICANT CHANGE UP
BACTERIA BLD CULT: SIGNIFICANT CHANGE UP
BUN SERPL-MCNC: 18 MG/DL — SIGNIFICANT CHANGE UP (ref 7–23)
CALCIUM SERPL-MCNC: 8.8 MG/DL — SIGNIFICANT CHANGE UP (ref 8.4–10.5)
CHLORIDE SERPL-SCNC: 94 MMOL/L — LOW (ref 98–107)
CO2 SERPL-SCNC: 27 MMOL/L — SIGNIFICANT CHANGE UP (ref 22–31)
CREAT SERPL-MCNC: 4.78 MG/DL — HIGH (ref 0.5–1.3)
GLUCOSE SERPL-MCNC: 136 MG/DL — HIGH (ref 70–99)
HAV IGM SER-ACNC: NONREACTIVE — SIGNIFICANT CHANGE UP
HBV CORE AB SER-ACNC: NONREACTIVE — SIGNIFICANT CHANGE UP
HBV CORE IGM SER-ACNC: NONREACTIVE — SIGNIFICANT CHANGE UP
HBV SURFACE AB SER-ACNC: <3 MLU/ML — LOW
HBV SURFACE AB SER-ACNC: SIGNIFICANT CHANGE UP
HBV SURFACE AG SER-ACNC: NEGATIVE — SIGNIFICANT CHANGE UP
HBV SURFACE AG SER-ACNC: NONREACTIVE — SIGNIFICANT CHANGE UP
HCT VFR BLD CALC: 28.8 % — LOW (ref 34.5–45)
HCV AB S/CO SERPL IA: 0.29 S/CO — SIGNIFICANT CHANGE UP
HCV AB SERPL-IMP: SIGNIFICANT CHANGE UP
HGB BLD-MCNC: 8.8 G/DL — LOW (ref 11.5–15.5)
MCHC RBC-ENTMCNC: 27.9 PG — SIGNIFICANT CHANGE UP (ref 27–34)
MCHC RBC-ENTMCNC: 30.6 % — LOW (ref 32–36)
MCV RBC AUTO: 91.4 FL — SIGNIFICANT CHANGE UP (ref 80–100)
NRBC # FLD: 0 — SIGNIFICANT CHANGE UP
PLATELET # BLD AUTO: 304 K/UL — SIGNIFICANT CHANGE UP (ref 150–400)
PMV BLD: 10.8 FL — SIGNIFICANT CHANGE UP (ref 7–13)
POTASSIUM SERPL-MCNC: 3.9 MMOL/L — SIGNIFICANT CHANGE UP (ref 3.5–5.3)
POTASSIUM SERPL-SCNC: 3.9 MMOL/L — SIGNIFICANT CHANGE UP (ref 3.5–5.3)
RBC # BLD: 3.15 M/UL — LOW (ref 3.8–5.2)
RBC # FLD: 17 % — HIGH (ref 10.3–14.5)
SODIUM SERPL-SCNC: 132 MMOL/L — LOW (ref 135–145)
WBC # BLD: 9.75 K/UL — SIGNIFICANT CHANGE UP (ref 3.8–10.5)
WBC # FLD AUTO: 9.75 K/UL — SIGNIFICANT CHANGE UP (ref 3.8–10.5)

## 2018-11-23 RX ORDER — ENOXAPARIN SODIUM 100 MG/ML
7 INJECTION SUBCUTANEOUS
Qty: 1 | Refills: 0 | OUTPATIENT
Start: 2018-11-23 | End: 2018-12-22

## 2018-11-23 RX ORDER — HYDRALAZINE HCL 50 MG
2.5 TABLET ORAL ONCE
Qty: 0 | Refills: 0 | Status: COMPLETED | OUTPATIENT
Start: 2018-11-23 | End: 2018-11-23

## 2018-11-23 RX ORDER — ASPIRIN/CALCIUM CARB/MAGNESIUM 324 MG
1 TABLET ORAL
Qty: 0 | Refills: 0 | COMMUNITY

## 2018-11-23 RX ORDER — ATORVASTATIN CALCIUM 80 MG/1
1 TABLET, FILM COATED ORAL
Qty: 30 | Refills: 0 | OUTPATIENT
Start: 2018-11-23 | End: 2018-12-22

## 2018-11-23 RX ORDER — DOCUSATE SODIUM 100 MG
1 CAPSULE ORAL
Qty: 30 | Refills: 0 | OUTPATIENT
Start: 2018-11-23 | End: 2018-12-22

## 2018-11-23 RX ORDER — ISOSORBIDE MONONITRATE 60 MG/1
1 TABLET, EXTENDED RELEASE ORAL
Qty: 30 | Refills: 0 | OUTPATIENT
Start: 2018-11-23 | End: 2018-12-22

## 2018-11-23 RX ORDER — ROSUVASTATIN CALCIUM 5 MG/1
1 TABLET ORAL
Qty: 0 | Refills: 0 | COMMUNITY

## 2018-11-23 RX ORDER — ISOSORBIDE MONONITRATE 60 MG/1
2 TABLET, EXTENDED RELEASE ORAL
Qty: 60 | Refills: 0 | OUTPATIENT
Start: 2018-11-23 | End: 2018-12-22

## 2018-11-23 RX ORDER — NIFEDIPINE 30 MG
1 TABLET, EXTENDED RELEASE 24 HR ORAL
Qty: 30 | Refills: 0 | OUTPATIENT
Start: 2018-11-23 | End: 2018-12-22

## 2018-11-23 RX ORDER — ASPIRIN/CALCIUM CARB/MAGNESIUM 324 MG
1 TABLET ORAL
Qty: 30 | Refills: 0 | OUTPATIENT
Start: 2018-11-23 | End: 2018-12-22

## 2018-11-23 RX ORDER — NIFEDIPINE 30 MG
1 TABLET, EXTENDED RELEASE 24 HR ORAL
Qty: 0 | Refills: 0 | COMMUNITY

## 2018-11-23 RX ORDER — GABAPENTIN 400 MG/1
1 CAPSULE ORAL
Qty: 60 | Refills: 0 | OUTPATIENT
Start: 2018-11-23 | End: 2018-12-22

## 2018-11-23 RX ORDER — METOPROLOL TARTRATE 50 MG
1 TABLET ORAL
Qty: 60 | Refills: 0 | OUTPATIENT
Start: 2018-11-23 | End: 2018-12-22

## 2018-11-23 RX ORDER — CLOPIDOGREL BISULFATE 75 MG/1
1 TABLET, FILM COATED ORAL
Qty: 30 | Refills: 0 | OUTPATIENT
Start: 2018-11-23 | End: 2018-12-22

## 2018-11-23 RX ORDER — PANTOPRAZOLE SODIUM 20 MG/1
1 TABLET, DELAYED RELEASE ORAL
Qty: 30 | Refills: 0 | OUTPATIENT
Start: 2018-11-23 | End: 2018-12-22

## 2018-11-23 RX ORDER — ERTAPENEM SODIUM 1 G/1
500 INJECTION, POWDER, LYOPHILIZED, FOR SOLUTION INTRAMUSCULAR; INTRAVENOUS
Qty: 0 | Refills: 0 | COMMUNITY

## 2018-11-23 RX ORDER — ERYTHROPOIETIN 10000 [IU]/ML
10000 INJECTION, SOLUTION INTRAVENOUS; SUBCUTANEOUS
Qty: 0 | Refills: 0 | DISCHARGE
Start: 2018-11-23

## 2018-11-23 RX ORDER — DOCUSATE SODIUM 100 MG
100 CAPSULE ORAL
Qty: 0 | Refills: 0 | COMMUNITY

## 2018-11-23 RX ORDER — METOPROLOL TARTRATE 50 MG
1 TABLET ORAL
Qty: 0 | Refills: 0 | COMMUNITY

## 2018-11-23 RX ORDER — ISOSORBIDE MONONITRATE 60 MG/1
2 TABLET, EXTENDED RELEASE ORAL
Qty: 0 | Refills: 0 | COMMUNITY

## 2018-11-23 RX ORDER — ISOSORBIDE MONONITRATE 60 MG/1
1 TABLET, EXTENDED RELEASE ORAL
Qty: 0 | Refills: 0 | COMMUNITY

## 2018-11-23 RX ORDER — SENNA PLUS 8.6 MG/1
2 TABLET ORAL
Qty: 60 | Refills: 0 | OUTPATIENT
Start: 2018-11-23 | End: 2018-12-22

## 2018-11-23 RX ORDER — GABAPENTIN 400 MG/1
1 CAPSULE ORAL
Qty: 0 | Refills: 0 | COMMUNITY

## 2018-11-23 RX ORDER — CLOPIDOGREL BISULFATE 75 MG/1
1 TABLET, FILM COATED ORAL
Qty: 0 | Refills: 0 | COMMUNITY

## 2018-11-23 RX ADMIN — CHLORHEXIDINE GLUCONATE 1 APPLICATION(S): 213 SOLUTION TOPICAL at 05:39

## 2018-11-23 RX ADMIN — Medication 50 MILLIGRAM(S): at 17:50

## 2018-11-23 RX ADMIN — CHLORHEXIDINE GLUCONATE 1 APPLICATION(S): 213 SOLUTION TOPICAL at 17:50

## 2018-11-23 RX ADMIN — Medication 2.5 MILLIGRAM(S): at 18:33

## 2018-11-23 RX ADMIN — Medication 2: at 17:50

## 2018-11-23 RX ADMIN — ERYTHROPOIETIN 10000 UNIT(S): 10000 INJECTION, SOLUTION INTRAVENOUS; SUBCUTANEOUS at 08:16

## 2018-11-23 RX ADMIN — ISOSORBIDE MONONITRATE 30 MILLIGRAM(S): 60 TABLET, EXTENDED RELEASE ORAL at 15:39

## 2018-11-23 NOTE — DISCHARGE NOTE ADULT - MEDICATION SUMMARY - MEDICATIONS TO STOP TAKING
I will STOP taking the medications listed below when I get home from the hospital:    ursodiol 300 mg oral capsule  -- 1 cap(s) by mouth every 12 hours    ertapenem  -- 500 milligram(s) intravenously once a day I will STOP taking the medications listed below when I get home from the hospital:    ursodiol 300 mg oral capsule  -- 1 cap(s) by mouth every 12 hours    pantoprazole 40 mg oral delayed release tablet  -- 1 tab(s) by mouth once a day (before a meal)    ertapenem  -- 500 milligram(s) intravenously once a day

## 2018-11-23 NOTE — DISCHARGE NOTE ADULT - CARE PLAN
Principal Discharge DX:	Septic shock  Goal:	Resolution of infection  Assessment and plan of treatment:	You were treated with IV antibiotics with improvement. Stable. Monitor for any further signs and symptoms of further infection, including but not limited to, fevers/chills, shortness of breath, increased heart rate, dizziness, or abrupt changes in mental status.  You were seen by Gastroenterology and had a stent placed. Stable. Monitor for fevers, chills, abdominal pain, nausea, vomiting. Follow up outpatient Dr. Lim [phone] for repeat ERCP and possible stent removal in 2-3 weeks for further management.  Secondary Diagnosis:	ESRD (end stage renal disease) on dialysis  Assessment and plan of treatment:	Please continue to follow your dialysis schedule and refer to your primary provider for further care/recommendations. Continue your medications and supplementation as directed.  Secondary Diagnosis:	CAD (coronary artery disease)  Assessment and plan of treatment:	Stable. Continue medications as recommended. Follow up outpatient PCP in 1-2 weeks for further management. Monitor for shortness of breath, chest pain, palpitations.  Secondary Diagnosis:	Hypertension  Assessment and plan of treatment:	Continue blood pressure medication regimen as directed. Monitor for any visual changes, headaches or dizziness.  Monitor blood pressure regularly.  Follow up with your PCP for further management for high blood pressure. Principal Discharge DX:	Septic shock  Goal:	Resolution of infection  Assessment and plan of treatment:	You were treated with IV antibiotics with improvement. Stable. Monitor for any further signs and symptoms of further infection, including but not limited to, fevers/chills, shortness of breath, increased heart rate, dizziness, or abrupt changes in mental status.  You were seen by Gastroenterology and had a stent placed. Stable. Monitor for fevers, chills, abdominal pain, nausea, vomiting. Follow up outpatient Dr. Lim [phone] for repeat ERCP and possible stent removal in 2-3 weeks for further management.  Secondary Diagnosis:	ESRD (end stage renal disease) on dialysis  Assessment and plan of treatment:	Please continue to follow your dialysis schedule and refer to your primary provider for further care/recommendations. Continue your medications and supplementation as directed.  Secondary Diagnosis:	CAD (coronary artery disease)  Assessment and plan of treatment:	Stable. Continue medications as recommended. Follow up outpatient PCP in 1-2 weeks for further management. Monitor for shortness of breath, chest pain, palpitations.  Secondary Diagnosis:	Hypertension  Assessment and plan of treatment:	Continue blood pressure medication regimen as directed. Monitor for any visual changes, headaches or dizziness.  Monitor blood pressure regularly.  Follow up with your PCP for further management for high blood pressure.  Secondary Diagnosis:	Diabetes  Assessment and plan of treatment:	Continue consistent carbohydrate diet.  Monitor blood glucose levels throughout the day before meals and at bedtime.  Record blood sugars and bring to outpatient providers appointment in order to be reviewed by your doctor for management modifications.  Be aware of diabetes management symptoms including feeling cool and clammy may be related to low glucose levels.  Feeling hot and dry may indicate high glucose levels. If you feel these symptoms, check your blood sugar.  Make regular podiatry appointments in order to have feet checked for wounds and toe nails cut by a doctor to prevent infections, as well as, appointments with an ophthalmologist to monitor your vision.

## 2018-11-23 NOTE — PROGRESS NOTE ADULT - PROBLEM SELECTOR PROBLEM 4
ESRD (end stage renal disease) on dialysis
Jaundice

## 2018-11-23 NOTE — PROGRESS NOTE ADULT - PROBLEM SELECTOR PROBLEM 2
Jaundice
Septicemia due to E. coli
Cholangitis
Cholecystitis, acute
Jaundice
Cholangitis concurrent with and due to calculus of gallbladder
Hypertension
Jaundice
Septicemia due to E. coli

## 2018-11-23 NOTE — PROGRESS NOTE ADULT - SUBJECTIVE AND OBJECTIVE BOX
Nephrology Followup Note - [phone] - Dr Owusu / Dr Herzog / Dr Flanagan / Dr Madsen / Dr Mosher / Dr Leon / Dr Vogt / Dr Hunter  Pt seen and examined at bedside  Pt feeling well. Had HD earlier this morning. Tolerating meals.     Allergies:  No Known Drug Allergies  Purell (Rash)    Hospital Medications:   MEDICATIONS  (STANDING):  atorvastatin 80 milliGRAM(s) Oral at bedtime  calcium carbonate 1250 mG  + Vitamin D (OsCal 500 + D) 1 Tablet(s) Oral daily  chlorhexidine 4% Liquid 1 Application(s) Topical two times a day  dextrose 5%. 1000 milliLiter(s) (50 mL/Hr) IV Continuous <Continuous>  dextrose 50% Injectable 12.5 Gram(s) IV Push once  docusate sodium 100 milliGRAM(s) Oral daily  epoetin analilia Injectable 75713 Unit(s) IV Push <User Schedule>  gabapentin 300 milliGRAM(s) Oral two times a day  insulin lispro (HumaLOG) corrective regimen sliding scale   SubCutaneous three times a day before meals  isosorbide   mononitrate ER Tablet (IMDUR) 30 milliGRAM(s) Oral daily  metoprolol tartrate 50 milliGRAM(s) Oral two times a day  NIFEdipine XL 60 milliGRAM(s) Oral daily  pantoprazole    Tablet 40 milliGRAM(s) Oral before breakfast  senna 2 Tablet(s) Oral at bedtime      VITALS:  T(F): 99 (11-23-18 @ 14:13), Max: 99 (11-23-18 @ 14:13)  HR: 78 (11-23-18 @ 14:13)  BP: 188/76 (11-23-18 @ 15:40)  RR: 17 (11-23-18 @ 14:13)  SpO2: 100% (11-23-18 @ 14:13)  Wt(kg): --    11-23 @ 07:01  -  11-23 @ 16:26  --------------------------------------------------------  IN: 400 mL / OUT: 2600 mL / NET: -2200 mL        PHYSICAL EXAM:  Constitutional: NAD  HEENT: anicteric sclera, oropharynx clear, MMM  Neck: No JVD  Respiratory: CTAB, no wheezes, rales or rhonchi  Cardiovascular: S1, S2, RRR  Gastrointestinal: BS+, soft, NT/ND  Extremities: No cyanosis or clubbing. No peripheral edema  Neurological: A/O x 3, no focal deficits  Psychiatric: Normal mood, normal affect  : No CVA tenderness. No aguilar.   Skin: No rashes  Vascular Access: RIJ TUnneled cath     LABS:  11-23    132<L>  |  94<L>  |  18  ----------------------------<  136<H>  3.9   |  27  |  4.78<H>    Ca    8.8      23 Nov 2018 06:43      Creatinine Trend: 4.78 <--, 4.86 <--, 3.39 <--, 5.26 <--, 2.98 <--                        8.8    9.75  )-----------( 304      ( 23 Nov 2018 06:50 )             28.8     Urine Studies:      RADIOLOGY & ADDITIONAL STUDIES:

## 2018-11-23 NOTE — DISCHARGE NOTE ADULT - CARE PROVIDER_API CALL
Lexa Lim), Gastroenterology; Internal Medicine  [address] [place] [address]  Phone: [phone]  Fax: [phone]

## 2018-11-23 NOTE — PROGRESS NOTE ADULT - SUBJECTIVE AND OBJECTIVE BOX
INTERVAL HPI/OVERNIGHT EVENTS:    denies n/v/d/c, abdominal pain, melena or brbpr     MEDICATIONS  (STANDING):  atorvastatin 80 milliGRAM(s) Oral at bedtime  calcium carbonate 1250 mG  + Vitamin D (OsCal 500 + D) 1 Tablet(s) Oral daily  chlorhexidine 4% Liquid 1 Application(s) Topical two times a day  dextrose 5%. 1000 milliLiter(s) (50 mL/Hr) IV Continuous <Continuous>  dextrose 50% Injectable 12.5 Gram(s) IV Push once  docusate sodium 100 milliGRAM(s) Oral daily  epoetin analilia Injectable 41669 Unit(s) IV Push <User Schedule>  gabapentin 300 milliGRAM(s) Oral two times a day  insulin lispro (HumaLOG) corrective regimen sliding scale   SubCutaneous three times a day before meals  isosorbide   mononitrate ER Tablet (IMDUR) 30 milliGRAM(s) Oral daily  metoprolol tartrate 50 milliGRAM(s) Oral two times a day  NIFEdipine XL 60 milliGRAM(s) Oral daily  pantoprazole    Tablet 40 milliGRAM(s) Oral before breakfast  senna 2 Tablet(s) Oral at bedtime    MEDICATIONS  (PRN):  acetaminophen   Tablet .. 650 milliGRAM(s) Oral every 6 hours PRN Temp greater or equal to 38C (100.4F)  dextrose 40% Gel 15 Gram(s) Oral once PRN Blood Glucose LESS THAN 70 milliGRAM(s)/deciliter  glucagon  Injectable 1 milliGRAM(s) IntraMuscular once PRN Glucose LESS THAN 70 milligrams/deciliter      Allergies    No Known Drug Allergies  Purell (Rash)    Intolerances        Review of Systems:    General:  No wt loss, fevers, chills, night sweats, fatigue   Eyes:  Good vision, no reported pain  ENT:  No sore throat, pain, runny nose, dysphagia  CV:  No pain, palpitations, hypo/hypertension  Resp:  No dyspnea, cough, tachypnea, wheezing  GI:  No pain, No nausea, No vomiting, No diarrhea, No constipation, No weight loss, No fever, No pruritis, No rectal bleeding, No melena, No dysphagia  :  No pain, bleeding, incontinence, nocturia  Muscle:  No pain, weakness  Neuro:  No weakness, tingling, memory problems  Psych:  No fatigue, insomnia, mood problems, depression  Endocrine:  No polyuria, polydypsia, cold/heat intolerance  Heme:  No petechiae, ecchymosis, easy bruisability  Skin:  No rash, tattoos, scars, edema      Vital Signs Last 24 Hrs  T(C): 37 (23 Nov 2018 10:05), Max: 37.1 (22 Nov 2018 21:30)  T(F): 98.6 (23 Nov 2018 10:05), Max: 98.7 (22 Nov 2018 21:30)  HR: 69 (23 Nov 2018 10:05) (69 - 81)  BP: 177/75 (23 Nov 2018 10:05) (161/72 - 182/83)  BP(mean): --  RR: 16 (23 Nov 2018 10:05) (16 - 18)  SpO2: 96% (23 Nov 2018 05:37) (95% - 98%)    PHYSICAL EXAM:    Constitutional: NAD  HEENT: EOMI, throat clear  Neck: No LAD, supple  Respiratory: CTA and P  Cardiovascular: S1 and S2, RRR, no M  Gastrointestinal: BS+, soft, NT/ND, neg HSM,  Extremities: No peripheral edema, neg clubbing, cyanosis  Vascular: 2+ peripheral pulses  Neurological: A/O x 3, no focal deficits  Psychiatric: Normal mood, normal affect  Skin: No rashes      LABS:                        8.8    9.75  )-----------( 304      ( 23 Nov 2018 06:50 )             28.8     11-23    132<L>  |  94<L>  |  18  ----------------------------<  136<H>  3.9   |  27  |  4.78<H>    Ca    8.8      23 Nov 2018 06:43            RADIOLOGY & ADDITIONAL TESTS:

## 2018-11-23 NOTE — PROGRESS NOTE ADULT - ASSESSMENT
Assessment  DMT2: 68y Female with DM T2 with hyperglycemia was on basal bolus insulin, had hypoglycemic episode likely due to poor PO intake in the setting of CRF , eating meals now, non compliant with low carb diet.  Jaundice: Being worked up, Tx. GI team FU  CAD: On medications, stable, monitored.  HTN: Controlled, On med.  ESRD: On hemodialysis, Monitor labs/BMP
68 annamaria speaking obese F with PMH of CAD s/p stent (2007), ESRD on HD (MWF; missed today's session), DM type 2, and HTN presents with fever and admitted with severe sepsis secondary to acute choledocholithiasis, s/p perc cholecystostomy tube. Repeat CT suggestion of choledocholithiasis with a 5 mm stone suggested in the CBD, S/p ERCP on 11/14, stone unable to be removed. This AM patient with abdominal pain on palpation, and 1 episode of vomiting this AM, elevated WBC, liver enzymes trending up, elevated bilirubin, and elevated procal, given Invanz, for transfer to [facility] today.
68 annamaria speaking obese F with PMH of CAD s/p stent (2007), ESRD on HD (MWF; missed today's session), DM type 2, and HTN presents with fever and admitted with severe sepsis secondary to acute choledocholithiasis, s/p perc cholecystostomy tube. Repeat CT suggestion of choledocholithiasis with a 5 mm stone suggested in the CBD, S/p ERCP on 11/14, stone unable to be removed. This AM patient with abdominal pain on palpation, and 1 episode of vomiting this AM, elevated WBC, liver enzymes trending up, elevated bilirubin, and elevated procal, given Invanz, for transfer to [facility] today.
68 annamaria speaking obese F with PMH of CAD s/p stent (2007), ESRD on HD (MWF; missed today's session), DM type 2, and HTN presents with fever and admitted with severe sepsis secondary to acute choledocholithiasis, s/p perc cholecystostomy tube. Repeat CT suggestion of choledocholithiasis with a 5 mm stone suggested in the CBD, S/p ERCP on 11/14, stone unable to be removed. This AM patient with abdominal pain on palpation, and 1 episode of vomiting this AM, elevated WBC, liver enzymes trending up, elevated bilirubin, and elevated procal, given Invanz, for transfer to [facility] today.
68 annamaria speaking obese F with PMH of CAD s/p stent (2007), ESRD on HD (MWF; missed today's session), DM type 2, and HTN presents with fever and admitted with severe sepsis secondary to acute choledocholithiasis, s/p perc cholecystostomy tube. Repeat CT suggestion of choledocholithiasis with a 5 mm stone suggested in the CBD, S/p ERCP on 11/14, stone unable to be removed. This AM patient with abdominal pain on palpation, and 1 episode of vomiting this AM, elevated WBC, liver enzymes trending up, elevated bilirubin, and elevated procal, given Invanz, for transfer to [facility] today.
68 annamaria speaking obese F with PMH of CAD s/p stent (2007), ESRD on HD (MWF; missed today's session), DM type 2, and HTN presents with fever and admitted with severe sepsis secondary to acute choledocholithiasis, s/p perc cholecystostomy tube. Repeat CT suggestion of choledocholithiasis with a 5 mm stone suggested in the CBD, S/p ERCP on 11/14, stone unable to be removed. This AM patient with abdominal pain on palpation, and 1 episode of vomiting this AM, elevated WBC, liver enzymes trending up, elevated bilirubin, and elevated procal, given Invanz, for transfer to [facility] today.
68 obese F with PMH of CAD s/p stent (2007), ESRD on HD (MWF outpt), DM type 2, and HTN presented to [facility] with fever and admitted with severe sepsis secondary to acute choledocholithiasis, s/p perc cholecystostomy tube. Repeat CT suggestion of choledocholithiasis with a 5 mm stone suggested in the CBD, S/p ERCP on 11/14, stone unable to be removed. Yesterday patient developed abdominal pain on palpation, and 1 episode of vomiting, elevated WBC, liver enzymes trending up, elevated bilirubin, and elevated procal, given Invanz, for transferred to [facility]. Renal followng for ESRD management/HD     ESRD on HD (MWF outpt)   Pulmonary vascular congestion on CXR from 11/15. currently clinically appers euvolemic  Acute Cholecystitis / Cholangitis: Patient s/p perc kevin, currently being treated with Ertapenem. Failed ERCP for removal of 5mm stone.  HTN, controlled.   Anemia in CKD  DM    labs, rad, chart reviewed
68 obese F with PMH of CAD s/p stent (2007), ESRD on HD (MWF outpt), DM type 2, and HTN presented to [facility] with fever and admitted with severe sepsis secondary to acute choledocholithiasis, s/p perc cholecystostomy tube. Repeat CT suggestion of choledocholithiasis with a 5 mm stone suggested in the CBD, S/p ERCP on 11/14, stone unable to be removed. Yesterday patient developed abdominal pain on palpation, and 1 episode of vomiting, elevated WBC, liver enzymes trending up, elevated bilirubin, and elevated procal, given Invanz, for transferred to [facility]. Renal followng for ESRD management/HD     ESRD on HD K 11/16 acceptable   Pulmonary vascular congestion on CXR from 11/15. currently clinically appers euvolemic  Acute Cholecystitis / Cholangitis: Patient s/p perc kevin (now removed), currently being treated with Ertapenem. Failed ERCP for removal of 5mm stone.  HTN, controlled.   Anemia in CKD  DM    labs, rad, chart reviewed
68 obese F with PMH of CAD s/p stent (2007), ESRD on HD (MWF outpt), DM type 2, and HTN presented to [facility] with fever and admitted with severe sepsis secondary to acute choledocholithiasis, s/p perc cholecystostomy tube. Repeat CT suggestion of choledocholithiasis with a 5 mm stone suggested in the CBD, S/p ERCP on 11/14, stone unable to be removed. Yesterday patient developed abdominal pain on palpation, and 1 episode of vomiting, elevated WBC, liver enzymes trending up, elevated bilirubin, and elevated procal, given Invanz, for transferred to [facility]. Renal followng for ESRD management/HD     ESRD on HD (MWF outpt)   Pulmonary vascular congestion on CXR from 11/15. currently clinically appers euvolemic  Acute Cholecystitis / Cholangitis: Patient s/p perc kevin, currently being treated with Ertapenem. Failed ERCP for removal of 5mm stone.  HTN, controlled.   Anemia in CKD  DM    labs, rad, chart reviewed
68 obese F with PMH of CAD s/p stent (2007), ESRD on HD (MWF outpt), DM type 2, and HTN presented to [facility] with fever and admitted with severe sepsis secondary to acute choledocholithiasis, s/p perc cholecystostomy tube. Repeat CT suggestion of choledocholithiasis with a 5 mm stone suggested in the CBD, S/p ERCP on 11/14, stone unable to be removed. Yesterday patient developed abdominal pain on palpation, and 1 episode of vomiting, elevated WBC, liver enzymes trending up, elevated bilirubin, and elevated procal, given Invanz, for transferred to [facility]. Renal followng for ESRD management/HD     ESRD on HD (MWF outpt)   Pulmonary vascular congestion on CXR from 11/15. currently clinically appers euvolemic  Acute Cholecystitis / Cholangitis: Patient s/p perc kevin, currently being treated with Ertapenem. Failed ERCP for removal of 5mm stone.  HTN, controlled.   Anemia in CKD  DM    labs, rad, chart reviewed
68 obese F with PMH of CAD s/p stent (2007), ESRD on HD (MWF outpt), DM type 2, and HTN presented to [facility] with fever and admitted with severe sepsis secondary to acute choledocholithiasis, s/p perc cholecystostomy tube. Repeat CT suggestion of choledocholithiasis with a 5 mm stone suggested in the CBD, S/p ERCP on 11/14, stone unable to be removed. Yesterday patient developed abdominal pain on palpation, and 1 episode of vomiting, elevated WBC, liver enzymes trending up, elevated bilirubin, and elevated procal, given Invanz, for transferred to [facility] Renal followng for ESRD management/HD     ESRD on HD (MWF outpt)   Pulmonary vascular congestion on CXR from 11/15. currently clinically appers euvolemic  Acute Cholecystitis / Cholangitis: Patient s/p perc kevin (now removed), currently being treated with Ertapenem. Failed ERCP for removal of 5mm stone.  HTN, controlled.   Anemia in CKD  DM    labs, rad, chart reviewed
68 obese F with PMH of CAD s/p stent (2007), ESRD on HD (MWF outpt), DM type 2, and HTN presented to [facility] with fever and admitted with severe sepsis secondary to acute choledocholithiasis, s/p perc cholecystostomy tube. Repeat CT suggestion of choledocholithiasis with a 5 mm stone suggested in the CBD, S/p ERCP on 11/14, stone unable to be removed. Yesterday patient developed abdominal pain on palpation, and 1 episode of vomiting, elevated WBC, liver enzymes trending up, elevated bilirubin, and elevated procal, given Invanz, for transferred to [facility]. Renal followng for ESRD management/HD     ESRD on HD (MWF outpt)   Pulmonary vascular congestion on CXR from 11/15. currently clinically appers euvolemic  Acute Cholecystitis / Cholangitis: Patient s/p perc kevin (now removed), currently being treated with Ertapenem. Failed ERCP for removal of 5mm stone.  HTN, controlled.   Anemia in CKD  DM    labs, rad, chart reviewed
68F with multiple medical issues including ESRD/HD, anemia CAD/MI/stent, DM2  Acute cholecystitis  S/P Perc Noa 10/30 and ESBL E. coli bacteremia  11/2 blood cx cleared  ertapenem 7 days past documented neg blood cultures so until 11/8  external biliary drain removed  ERCP unsuccessful 11/15  11/16 sig rise in WBC and clinical decline-- transfer to [facility]  11/20 ERCP with sphincterotomy, stone retrieval and stent placement.  No concern of uncontrolled infection on exam
68F with multiple medical issues including ESRD/HD, anemia CAD/MI/stent, DM2  Acute cholecystitis  S/P Perc Noa 10/30 and ESBL E. coli bacteremia  11/2 blood cx cleared  ertapenem 7 days past documented neg blood cultures so until 11/8  external biliary drain removed  ERCP unsuccessful 11/15  11/16 sig rise in WBC and clinical decline-- transfer to [facility]  11/20 ERCP with sphincterotomy, stone retrieval and stent placement.  No concern of uncontrolled infection on exam
68F with multiple medical issues including ESRD/HD, anemia CAD/MI/stent, DM2  Acute cholecystitis  S/P Perc Noa 10/30 and ESBL E. coli bacteremia  11/2 blood cx cleared  ertapenem 7 days past documented neg blood cultures so until 11/8  external biliary drain removed  ERCP unsuccessful 11/15  11/16 sig rise in WBC and clinical decline-- transfer to [facility]
Assessment  DMT2: 68y Female with DM T2 with hyperglycemia was on basal bolus insulin, had hypoglycemic episode likely due to poor PO intake in the setting of CRF , eating partial meals, non compliant with low carb diet.  Jaundice: Being worked up, Tx. GI team FU  CAD: On medications, stable, monitored.  HTN: Controlled, On med.  ESRD: On hemodialysis, Monitor labs/BMP
Impression:  1)Acute cholecystitis with choledocolithiasis s/p ERCP 11/19 with stone removal and CBD stent placement    Recommendations:  trend liver enzymes  monitor WBC and temperature curve  c/w antibiotics  will need repeat ERCP with stent removal in 6-8 weeks (patient can f/u with Dr. Lim [phone])    Please call with questions  Vicenta Sánchez  GI Fellow  Pager: [phone]
68F with multiple medical issues including ESRD/HD, CAD/MI/stent, DM2  Acute cholecystitis  S/P Perc Noa 10/30 and ESBL E. coli bacteremia  11/2 blood cx cleared  ertapenem 7 days past documented neg blood cultures so until 11/8  external biliary drain removed  ERCP unsuccessful 11/15  11/16 sig rise in WBC and clinical decline
Assessment  DMT2: 68y Female with DM T2 with hyperglycemia was on basal bolus insulin, had hypoglycemic episode likely due to poor PO intake in the setting of CRF , now eating full meals, feeling better, non compliant with low carb diet.  Jaundice: Being worked up, Tx. GI team FU  CAD: On medications, stable, monitored.  HTN: Controlled, On med.  ESRD: On hemodialysis, Monitor labs/BMP

## 2018-11-23 NOTE — PROGRESS NOTE ADULT - PROBLEM SELECTOR PLAN 2
s/p drainage and tube removal by IR at [facility]  s/p ERCP with stone removal CBD stent placement 11/19/2018  s/p abx   outpt fu for repeat ERCP/stent removal per [facility] w/Dr. Lim [phone]  tolerating po intake

## 2018-11-23 NOTE — CHART NOTE - NSCHARTNOTEFT_GEN_A_CORE
Spoke with attending, medically stable for discharge, OK to resume ASA/Plavix upon discharge.     ADS  [id number] Spoke with attending, medically stable for discharge, OK to resume ASA/Plavix upon discharge.     Spoke with Endocrinology, recommend Lantus 7 U QHS upon discharge.     ADS  [id number]

## 2018-11-23 NOTE — PROVIDER CONTACT NOTE (OTHER) - ASSESSMENT
Alert and oriented to all, Roberto speaking.  Reported by PCA FS was 43.
No distress noted  Pt is alert and oriented x4
Patient asymptomatic, denying s/s of hypoglycemia.
Patient asymptomatic.
S/p HD today. BP meds were held in the AM. Pt c/o anxiousness, says "she's waiting for her family."

## 2018-11-23 NOTE — PROGRESS NOTE ADULT - PROBLEM SELECTOR PLAN 1
s/p drainage and tube removal by IR  repeat ct noted, showed possible cbd stone  11/14 underwent unsuccessful ercp   npo for repeat ercp by house gi today  npo/ivf  cont abx  cont ppi, zofran prn  pain control  monitor abd exam  trend labs  would hold asa/plavix as able given planned procedures
Will continue monitoring FS, log, will Follow up.  Patient counseled for compliance with consistent low carb diet.
s/p ERCP with stone removal CBD stent placement 11/19/2018  cont abx   outpt fu for repeat ERCP/stent removal per house GI w/Dr. Lim [phone]  advance diet as tolerated
s/p ERCP with stone removal CBD stent placement 11/19/2018  cont abx per ID recommendations re: duration  outpt fu for repeat ERCP/stent removal per house GI w/Dr. Lim [phone]  tolerating po intake  no gi objection to dc planning per primary team
s/p ERCP with stone removal CBD stent placement 11/19/2018  s/p abx  outpt fu for repeat ERCP/stent removal per house GI w/Dr. Lim [phone]  tolerating po intake  no gi objection to dc planning per primary team
s/p ERCP with stone removal CBD stent placement 11/19/2018  s/p abx  outpt fu for repeat ERCP/stent removal per house GI w/Dr. Lim [phone]  tolerating po intake  no gi objection to dc planning per primary team
s/p drainage and tube removal by IR  repeat ct noted, showed possible cbd stone  11/14 underwent unsuccessful ercp   now w leukocytosis and uptrending lfts  remains afebrile, HD stable  transfered to [facility] for possible repeat ercp by house gi  npo/ivf  cont abx  cont ppi, zofran prn  pain control  monitor abd exam  trend labs  would hold asa/plavix as able given planned procedures
s/p drainage and tube removal by IR  repeat ct noted, showed possible cbd stone  11/14 underwent unsuccessful ercp   now w leukocytosis and uptrending lfts  remains afebrile, HD stable  transfered to [facility] for possible repeat ercp by house gi  npo/ivf  cont abx  cont ppi, zofran prn  pain control  monitor abd exam  trend labs  would hold asa/plavix as able given planned procedures
BP improved. E Coli bacteremia. ID eval noted, continue Ertapenem
E Coli bacteremia. ID eval noted, continue Ertapenem
E Coli bacteremia. ID eval noted, continue Ertapenem
E Coli bacteremia. ID eval noted, continue Ertapenem.  Afebrile.  Duration of antibiotic as per ID.
Electrolytes within normal limits.  HD this morning uneventful, with ~2kg UF achieved.   Electrolytes within normal limits.
Electrolytes within normal limits.  HD yesterday uneventful. Tolerated 1.9kg UF.  Plan for HD tomorrow.
Electrolytes within normal limits.  PLan for HD today, UF goal ~2kg, as BP tolerates.
Improved.  Afebrile.  Duration of antibiotic as per ID.
LFT's decreasing  S/P Perc kevin  Now s/p ERCP stent with relief of obstruction
No new LFT's  S/P Perc kevin  Now s/p ERCP stent with relief of obstruction
Will continue monitoring FS, log, will Follow up.  Patient counseled for compliance with consistent low carb diet.
for repeat ERCP  no new LFT today  yesterday slightly lower
had HD yesterday, tolerated it well   had ERCP yesterday  dose all meds for ESRD  Rpt HD tomorrow
s/p HD 11/17, rx sheet reviewed, net uf 1.5L removed, uneventful  c/w epogen 10k tiw w/hd  plan for next HD tomorrow for w/2k bath, uf 1.5kg as tolerated  renal restrictions in diet when resumed  dose all meds for ESRD
s/p last HD 11/17. K , vol acceptable  c/w epogen 10k tiw w/hd tiw  plan for HD tonight w/2k bath, uf 1.5kg as tolerated  renal restrictions in diet when resumed  dose all meds for ESRD  optimized renal stand point for GI procedure/ERCP today
for repeat ERCP  clinically a little better today  for repeat ERCP 11/19
Will continue monitoring FS, log, will Follow up.  Patient counseled for compliance with consistent low carb diet.

## 2018-11-23 NOTE — PROGRESS NOTE ADULT - PROBLEM SELECTOR PLAN 3
On medications, monitored and followed up by primary/cardiology team
HD per renal
likely multifactorial, sepsis, acute illness, renal insufficiency  hgb trend noted, low but stable  no overt s/s gib  trend h/h, transfuse prn  ppi qd
On medications, monitored and followed up by primary/cardiology team
likely multifactorial, sepsis, acute illness, renal insufficiency  hgb trend noted, low but stable  no overt s/s gib  trend h/h, transfuse prn  ppi qd
likely multifactorial, sepsis, acute illness, renal insufficiency  hgb trend noted, low but stable  no overt s/s gib  trend h/h, transfuse prn  ppi qd  consider iron studies  if hgb continues to downtrend check fobt
likely multifactorial, sepsis, acute illness, renal insufficiency  hgb trend noted, low but stable  no overt s/s gib  trend h/h, transfuse prn  ppi qd  consider iron studies  if hgb continues to downtrend check fobt
HD per renal
Mx per medicine
On medications, monitored and followed up by primary/cardiology team
s/p stent in 2007.  -C/W Asa, Plavix, imdur   -
s/p stent in 2007.  -C/W Asa, Plavix, imdur   -cardio to see patient here
s/p stent in 2007.  -C/W Asa, Plavix, imdur   -cardio to see patient here

## 2018-11-23 NOTE — PROGRESS NOTE ADULT - PROBLEM SELECTOR PLAN 4
On HD, continue as scheduled, renal team FU
On HD, continue as scheduled, renal team FU
- MWF.     ,
-HD MWF.     -Nephro called  -For dialysis today,
-HD MWF.     -Nephro called  -For dialysis today,
Had ERCP yesterday, with sphincerectomy and stent  stone removed  abd pain better  Bili still slightly high, monitor  On Ertapenem for cholecystitis
Had ERCP, with sphincerectomy and stent  stone removed  On Ertapenem for cholecystitis
On HD, continue as scheduled, renal team FU
f/u w/GI  Plan for ERCP early this week as per GI
f/u w/GI  Plan for ERCP today, as per GI

## 2018-11-23 NOTE — DISCHARGE NOTE ADULT - PLAN OF CARE
Resolution of infection You were treated with IV antibiotics with improvement. Stable. Monitor for any further signs and symptoms of further infection, including but not limited to, fevers/chills, shortness of breath, increased heart rate, dizziness, or abrupt changes in mental status.  You were seen by Gastroenterology and had a stent placed. Stable. Monitor for fevers, chills, abdominal pain, nausea, vomiting. Follow up outpatient Dr. Lim [phone] for repeat ERCP and possible stent removal in 2-3 weeks for further management. Please continue to follow your dialysis schedule and refer to your primary provider for further care/recommendations. Continue your medications and supplementation as directed. Stable. Continue medications as recommended. Follow up outpatient PCP in 1-2 weeks for further management. Monitor for shortness of breath, chest pain, palpitations. Continue blood pressure medication regimen as directed. Monitor for any visual changes, headaches or dizziness.  Monitor blood pressure regularly.  Follow up with your PCP for further management for high blood pressure. Continue consistent carbohydrate diet.  Monitor blood glucose levels throughout the day before meals and at bedtime.  Record blood sugars and bring to outpatient providers appointment in order to be reviewed by your doctor for management modifications.  Be aware of diabetes management symptoms including feeling cool and clammy may be related to low glucose levels.  Feeling hot and dry may indicate high glucose levels. If you feel these symptoms, check your blood sugar.  Make regular podiatry appointments in order to have feet checked for wounds and toe nails cut by a doctor to prevent infections, as well as, appointments with an ophthalmologist to monitor your vision.

## 2018-11-23 NOTE — PROGRESS NOTE ADULT - PROVIDER SPECIALTY LIST ADULT
Endocrinology
Gastroenterology
Infectious Disease
Internal Medicine
Nephrology
Endocrinology
Gastroenterology
Endocrinology

## 2018-11-23 NOTE — PROGRESS NOTE ADULT - PROBLEM SELECTOR PROBLEM 1
Cholecystitis, acute
Cholangitis
Cholecystitis, acute
Cholecystitis, acute
Diabetes
Cholecystitis, acute
Diabetes
ESRD (end stage renal disease) on dialysis
Septic shock
Cholecystitis, acute
Diabetes

## 2018-11-23 NOTE — PROGRESS NOTE ADULT - SUBJECTIVE AND OBJECTIVE BOX
Chief complaint  Patient is a 68y old  Female who presents with a chief complaint of Transfer for higher level of care (22 Nov 2018 15:09)   Review of systems  Patient in bed, looks comfortable, no fever, no hypoglycemia.    Labs and Fingersticks  CAPILLARY BLOOD GLUCOSE      POCT Blood Glucose.: 110 mg/dL (23 Nov 2018 08:15)  POCT Blood Glucose.: 140 mg/dL (23 Nov 2018 05:45)  POCT Blood Glucose.: 135 mg/dL (22 Nov 2018 21:47)  POCT Blood Glucose.: 179 mg/dL (22 Nov 2018 17:27)  POCT Blood Glucose.: 160 mg/dL (22 Nov 2018 11:55)          Calcium, Total Serum: 8.8 (11-23 @ 06:43)          11-23    132<L>  |  94<L>  |  18  ----------------------------<  136<H>  3.9   |  27  |  4.78<H>    Ca    8.8      23 Nov 2018 06:43                          8.8    9.75  )-----------( 304      ( 23 Nov 2018 06:50 )             28.8     Medications  MEDICATIONS  (STANDING):  atorvastatin 80 milliGRAM(s) Oral at bedtime  calcium carbonate 1250 mG  + Vitamin D (OsCal 500 + D) 1 Tablet(s) Oral daily  chlorhexidine 4% Liquid 1 Application(s) Topical two times a day  dextrose 5%. 1000 milliLiter(s) (50 mL/Hr) IV Continuous <Continuous>  dextrose 50% Injectable 12.5 Gram(s) IV Push once  docusate sodium 100 milliGRAM(s) Oral daily  epoetin analilia Injectable 14713 Unit(s) IV Push <User Schedule>  gabapentin 300 milliGRAM(s) Oral two times a day  insulin lispro (HumaLOG) corrective regimen sliding scale   SubCutaneous three times a day before meals  isosorbide   mononitrate ER Tablet (IMDUR) 30 milliGRAM(s) Oral daily  metoprolol tartrate 50 milliGRAM(s) Oral two times a day  NIFEdipine XL 60 milliGRAM(s) Oral daily  pantoprazole    Tablet 40 milliGRAM(s) Oral before breakfast  senna 2 Tablet(s) Oral at bedtime      Physical Exam  General: Patient comfortable in bed  Vital Signs Last 12 Hrs  T(F): 98.6 (11-23-18 @ 10:05), Max: 98.6 (11-23-18 @ 05:37)  HR: 69 (11-23-18 @ 10:05) (69 - 71)  BP: 177/75 (11-23-18 @ 10:05) (161/72 - 182/83)  BP(mean): --  RR: 16 (11-23-18 @ 10:05) (16 - 18)  SpO2: 96% (11-23-18 @ 05:37) (96% - 96%)  Neck: No palpable thyroid nodules.  CVS: S1S2, No murmurs  Respiratory: No wheezing, no crepitations  GI: Abdomen soft, bowel sounds positive  Musculoskeletal:  edema lower extremities.   Skin: No skin rashes, no ecchymosis    Diagnostics

## 2018-11-23 NOTE — DISCHARGE NOTE ADULT - HOME CARE AGENCY
Home care referral sent to [facility]  pending insurance approval.  Because of your insurance home care agencies are limited. Once insurance authorizes a visit. The nurse will call you at home.

## 2018-11-23 NOTE — DISCHARGE NOTE ADULT - MEDICATION SUMMARY - MEDICATIONS TO TAKE
I will START or STAY ON the medications listed below when I get home from the hospital:    Aspirin Enteric Coated 81 mg oral delayed release tablet  -- 1 tab(s) by mouth once a day  -- Indication: For CAD (coronary artery disease)    isosorbide mononitrate 30 mg oral tablet, extended release  -- 2 tab(s) by mouth once a day (in the morning)  -- Indication: For Hypertension    isosorbide mononitrate 30 mg oral tablet, extended release  -- 1 tab(s) by mouth once a day (in the evening)  -- Indication: For Hypertension    gabapentin 300 mg oral capsule  -- 1 cap(s) by mouth 2 times a day  -- Indication: For Pain    Lantus Solostar Pen 100 units/mL subcutaneous solution  -- 7 unit(s) subcutaneous once a day (at bedtime)   -- Do not drink alcoholic beverages when taking this medication.  It is very important that you take or use this exactly as directed.  Do not skip doses or discontinue unless directed by your doctor.  Keep in refrigerator.  Do not freeze.    -- Indication: For Diabetes    atorvastatin 80 mg oral tablet  -- 1 tab(s) by mouth once a day (at bedtime)  -- Indication: For Hyperlipidemia    clopidogrel 75 mg oral tablet  -- 1 tab(s) by mouth once a day  -- Indication: For CAD (coronary artery disease)    metoprolol tartrate 50 mg oral tablet  -- 1 tab(s) by mouth 2 times a day  -- Indication: For Hypertension    NIFEdipine 60 mg oral tablet, extended release  -- 1 tab(s) by mouth once a day   -- Indication: For Hypertension    epoetin analilia  -- 46068 unit(s) intravenous 3 times a day T/T/S  intra-dialysis   -- Indication: For ESRD (end stage renal disease) on dialysis    Colace 100 mg oral capsule  -- 1 cap(s) by mouth once a day   -- Medication should be taken with plenty of water.    -- Indication: For Constipation    senna oral tablet  -- 2 tab(s) by mouth once a day (at bedtime)  -- Indication: For Constipation    pantoprazole 40 mg oral delayed release tablet  -- 1 tab(s) by mouth once a day (before a meal)  -- Indication: For Reflux disease    calcium-vitamin D 500 mg-200 intl units oral tablet  -- 1 tab(s) by mouth once a day  -- Indication: For Supplement I will START or STAY ON the medications listed below when I get home from the hospital:    Aspirin Enteric Coated 81 mg oral delayed release tablet  -- 1 tab(s) by mouth once a day  -- Indication: For CAD (coronary artery disease)    isosorbide mononitrate 30 mg oral tablet, extended release  -- 2 tab(s) by mouth once a day (in the morning)  -- Indication: For Hypertension    isosorbide mononitrate 30 mg oral tablet, extended release  -- 1 tab(s) by mouth once a day (in the evening)  -- Indication: For Hypertension    gabapentin 300 mg oral capsule  -- 1 cap(s) by mouth 2 times a day  -- Indication: For Pain    Lantus Solostar Pen 100 units/mL subcutaneous solution  -- 7 unit(s) subcutaneous once a day (at bedtime)   -- Do not drink alcoholic beverages when taking this medication.  It is very important that you take or use this exactly as directed.  Do not skip doses or discontinue unless directed by your doctor.  Keep in refrigerator.  Do not freeze.    -- Indication: For Diabetes    atorvastatin 80 mg oral tablet  -- 1 tab(s) by mouth once a day (at bedtime)  -- Indication: For Hyperlipidemia    clopidogrel 75 mg oral tablet  -- 1 tab(s) by mouth once a day  -- Indication: For CAD (coronary artery disease)    metoprolol tartrate 50 mg oral tablet  -- 1 tab(s) by mouth 2 times a day  -- Indication: For Hypertension    NIFEdipine 60 mg oral tablet, extended release  -- 1 tab(s) by mouth once a day   -- Indication: For Hypertension    epoetin analilia  -- 00365 unit(s) intravenous 3 times a day T/T/S  intra-dialysis   -- Indication: For ESRD (end stage renal disease) on dialysis    Colace 100 mg oral capsule  -- 1 cap(s) by mouth once a day   -- Medication should be taken with plenty of water.    -- Indication: For Constipation    senna oral tablet  -- 2 tab(s) by mouth once a day (at bedtime)  -- Indication: For Constipation    pantoprazole 40 mg oral delayed release tablet  -- 1 tab(s) by mouth once a day (before a meal)  -- Indication: For Reflux disease    calcium-vitamin D 500 mg-200 intl units oral tablet  -- 1 tab(s) by mouth once a day  -- Indication: For Supplement I will START or STAY ON the medications listed below when I get home from the hospital:    Aspirin Enteric Coated 81 mg oral delayed release tablet  -- 1 tab(s) by mouth once a day  -- Indication: For CAD (coronary artery disease)    isosorbide mononitrate 30 mg oral tablet, extended release  -- 2 tab(s) by mouth once a day (in the morning)  -- Indication: For Hypertension    isosorbide mononitrate 30 mg oral tablet, extended release  -- 1 tab(s) by mouth once a day (in the evening)  -- Indication: For Hypertension    gabapentin 300 mg oral capsule  -- 1 cap(s) by mouth 2 times a day  -- Indication: For Pain    Lantus Solostar Pen 100 units/mL subcutaneous solution  -- 7 unit(s) subcutaneous once a day (at bedtime)   -- Do not drink alcoholic beverages when taking this medication.  It is very important that you take or use this exactly as directed.  Do not skip doses or discontinue unless directed by your doctor.  Keep in refrigerator.  Do not freeze.    -- Indication: For Diabetes    atorvastatin 80 mg oral tablet  -- 1 tab(s) by mouth once a day (at bedtime)  -- Indication: For Hyperlipidemia    clopidogrel 75 mg oral tablet  -- 1 tab(s) by mouth once a day  -- Indication: For CAD (coronary artery disease)    metoprolol tartrate 50 mg oral tablet  -- 1 tab(s) by mouth 2 times a day  -- Indication: For Hypertension    NIFEdipine 60 mg oral tablet, extended release  -- 1 tab(s) by mouth once a day   -- Indication: For Hypertension    epoetin analilia  -- 63586 unit(s) intravenous 3 times a day T/T/S  intra-dialysis   -- Indication: For ESRD (end stage renal disease) on dialysis    Colace 100 mg oral capsule  -- 1 cap(s) by mouth once a day   -- Medication should be taken with plenty of water.    -- Indication: For Constipation    senna oral tablet  -- 2 tab(s) by mouth once a day (at bedtime)  -- Indication: For Constipation    pantoprazole 40 mg oral delayed release tablet  -- 1 tab(s) by mouth once a day (before a meal)  -- Indication: For Reflux disease    calcium-vitamin D 500 mg-200 intl units oral tablet  -- 1 tab(s) by mouth once a day  -- Indication: For Supplement I will START or STAY ON the medications listed below when I get home from the hospital:    Aspirin Enteric Coated 81 mg oral delayed release tablet  -- 1 tab(s) by mouth once a day  -- Indication: For CAD (coronary artery disease)    isosorbide mononitrate 30 mg oral tablet, extended release  -- 2 tab(s) by mouth once a day (in the morning)  1 tabs by mouth once a day (in the evening)  -- Indication: For Hypertension    gabapentin 300 mg oral capsule  -- 1 cap(s) by mouth 2 times a day  -- Indication: For Pain    Basaglar KwikPen 100 units/mL subcutaneous solution  -- 7 unit(s) subcutaneous once a day (at bedtime)   -- Do not drink alcoholic beverages when taking this medication.  It is very important that you take or use this exactly as directed.  Do not skip doses or discontinue unless directed by your doctor.  Keep in refrigerator.  Do not freeze.    -- Indication: For Diabetes    atorvastatin 80 mg oral tablet  -- 1 tab(s) by mouth once a day (at bedtime)  -- Indication: For Hyperlipidemia    clopidogrel 75 mg oral tablet  -- 1 tab(s) by mouth once a day  -- Indication: For CAD (coronary artery disease)    metoprolol tartrate 50 mg oral tablet  -- 1 tab(s) by mouth 2 times a day  -- Indication: For Hypertension    NIFEdipine 60 mg oral tablet, extended release  -- 1 tab(s) by mouth once a day   -- Indication: For Hypertension    epoetin analilia  -- 57198 unit(s) intravenous 3 times a day T/T/S  intra-dialysis   -- Indication: For ESRD (end stage renal disease) on dialysis    senna oral tablet  -- 2 tab(s) by mouth once a day (at bedtime)  -- Indication: For Constipation    Colace 100 mg oral capsule  -- 1 cap(s) by mouth once a day   -- Medication should be taken with plenty of water.    -- Indication: For Constipation    NexIUM 40 mg oral delayed release capsule  -- 1 cap(s) by mouth once a day  -- Indication: For Reflux disease    calcium-vitamin D 500 mg-200 intl units oral tablet  -- 1 tab(s) by mouth once a day  -- Indication: For Supplement I will START or STAY ON the medications listed below when I get home from the hospital:    Glucometer  -- Dispense 1 device   -- Indication: For Diabetes    Test Strips  -- Check BG 4x daily, pre-meals and at bedtime   -- Indication: For Diabetes    Insulin pen needles   -- Check BG 4x daily, pre-meals and at bedtime   -- Indication: For Diabetes    Lancets  -- Check BG 4x daily, pre-meals and at bedtime   -- Indication: For Diabetes    Alcohol Pads  -- Check BG 4x daily, pre-meals and at bedtime   -- Indication: For Diabetes    Aspirin Enteric Coated 81 mg oral delayed release tablet  -- 1 tab(s) by mouth once a day  -- Indication: For CAD (coronary artery disease)    isosorbide mononitrate 30 mg oral tablet, extended release  -- 2 tab(s) by mouth once a day (in the morning)  1 tabs by mouth once a day (in the evening)  -- Indication: For Hypertension    gabapentin 300 mg oral capsule  -- 1 cap(s) by mouth 2 times a day  -- Indication: For Pain    Basaglar KwikPen 100 units/mL subcutaneous solution  -- 7 unit(s) subcutaneous once a day (at bedtime)   -- Do not drink alcoholic beverages when taking this medication.  It is very important that you take or use this exactly as directed.  Do not skip doses or discontinue unless directed by your doctor.  Keep in refrigerator.  Do not freeze.    -- Indication: For Diabetes    atorvastatin 80 mg oral tablet  -- 1 tab(s) by mouth once a day (at bedtime)  -- Indication: For Hyperlipidemia    clopidogrel 75 mg oral tablet  -- 1 tab(s) by mouth once a day  -- Indication: For CAD (coronary artery disease)    metoprolol tartrate 50 mg oral tablet  -- 1 tab(s) by mouth 2 times a day  -- Indication: For Hypertension    NIFEdipine 60 mg oral tablet, extended release  -- 1 tab(s) by mouth once a day   -- Indication: For Hypertension    epoetin analilia  -- 79406 unit(s) intravenous 3 times a day T/T/S  intra-dialysis   -- Indication: For ESRD (end stage renal disease) on dialysis    senna oral tablet  -- 2 tab(s) by mouth once a day (at bedtime)  -- Indication: For Constipation    Colace 100 mg oral capsule  -- 1 cap(s) by mouth once a day   -- Medication should be taken with plenty of water.    -- Indication: For Constipation    NexIUM 40 mg oral delayed release capsule  -- 1 cap(s) by mouth once a day  -- Indication: For Reflux disease    calcium-vitamin D 500 mg-200 intl units oral tablet  -- 1 tab(s) by mouth once a day  -- Indication: For Supplement

## 2018-11-23 NOTE — DISCHARGE NOTE ADULT - INSTRUCTIONS
Physical therapy recommended rehab, refused, home with home physical therapy  Consistent carbohydrate with evening snack

## 2018-11-23 NOTE — PROGRESS NOTE ADULT - PROBLEM SELECTOR PROBLEM 3
CAD (coronary artery disease)
ESRD (end stage renal disease) on dialysis
Anemia
CAD (coronary artery disease)
Diabetes
ESRD (end stage renal disease) on dialysis

## 2018-11-23 NOTE — PROVIDER CONTACT NOTE (OTHER) - DATE AND TIME:
Reviewed record in preparation for visit and have obtained necessary documentation. Identified pt with two pt identifiers(name and ). Chief Complaint   Patient presents with   DEEPAK Bruno 1 Maintenance Due   Topic Date Due    Diabetic Foot Care  1974    Eye Exam  1974    Flu Vaccine  2018    Hemoglobin A1C    2018    Cholesterol Test   2018       Mr. Shubham Lopez has a reminder for a \"due or due soon\" health maintenance. I have asked that he discuss this further with his primary care provider for follow-up on this health maintenance. Coordination of Care Questionnaire:  :     1) Have you been to an emergency room, urgent care clinic since your last visit? no   Hospitalized since your last visit? no             2) Have you seen or consulted any other health care providers outside of [address] [place] [address] since your last visit? no  (Include any pap smears or colon screenings in this section.)    3) In the event something were to happen to you and you were unable to speak on your behalf, do you have an Advance Directive/ Living Will in place stating your wishes? NO    Do you have an Advance Directive on file? no    4) Are you interested in receiving information on Advance Directives? YES    Patient is accompanied by self I have received verbal consent from Jose Santos to discuss any/all medical information while they are present in the room. 23-Nov-2018 15:00

## 2018-11-23 NOTE — DISCHARGE NOTE ADULT - PATIENT PORTAL LINK FT
You can access the [facility] Patient Portal, offered by [facility], by registering with the following website: [email or web address]

## 2018-11-23 NOTE — DISCHARGE NOTE ADULT - MEDICATION SUMMARY - MEDICATIONS TO CHANGE
I will SWITCH the dose or number of times a day I take the medications listed below when I get home from the hospital:    rosuvastatin 20 mg oral tablet  -- 1 tab(s) by mouth once a day    epoetin analilia  -- 20152 unit(s) intravenously once a month    Lantus Solostar Pen 100 units/mL subcutaneous solution  -- 24 unit(s) subcutaneous once a day (in the morning)   -- Do not drink alcoholic beverages when taking this medication.  It is very important that you take or use this exactly as directed.  Do not skip doses or discontinue unless directed by your doctor.  Keep in refrigerator.  Do not freeze. I will SWITCH the dose or number of times a day I take the medications listed below when I get home from the hospital:    rosuvastatin 20 mg oral tablet  -- 1 tab(s) by mouth once a day    epoetin analilia  -- 54191 unit(s) intravenously once a month    Lantus Solostar Pen 100 units/mL subcutaneous solution  -- 24 unit(s) subcutaneous once a day (in the morning)   -- Do not drink alcoholic beverages when taking this medication.  It is very important that you take or use this exactly as directed.  Do not skip doses or discontinue unless directed by your doctor.  Keep in refrigerator.  Do not freeze. I will SWITCH the dose or number of times a day I take the medications listed below when I get home from the hospital:    rosuvastatin 20 mg oral tablet  -- 1 tab(s) by mouth once a day    epoetin analilia  -- 97560 unit(s) intravenously once a month    Lantus Solostar Pen 100 units/mL subcutaneous solution  -- 24 unit(s) subcutaneous once a day (in the morning)   -- Do not drink alcoholic beverages when taking this medication.  It is very important that you take or use this exactly as directed.  Do not skip doses or discontinue unless directed by your doctor.  Keep in refrigerator.  Do not freeze. I will SWITCH the dose or number of times a day I take the medications listed below when I get home from the hospital:    rosuvastatin 20 mg oral tablet  -- 1 tab(s) by mouth once a day    epoetin analilia  -- 79972 unit(s) intravenously once a month    Lantus Solostar Pen 100 units/mL subcutaneous solution  -- 24 unit(s) subcutaneous once a day (in the morning)   -- Do not drink alcoholic beverages when taking this medication.  It is very important that you take or use this exactly as directed.  Do not skip doses or discontinue unless directed by your doctor.  Keep in refrigerator.  Do not freeze. I will SWITCH the dose or number of times a day I take the medications listed below when I get home from the hospital:    rosuvastatin 20 mg oral tablet  -- 1 tab(s) by mouth once a day    epoetin analilia  -- 26943 unit(s) intravenously once a month    Lantus Solostar Pen 100 units/mL subcutaneous solution  -- 24 unit(s) subcutaneous once a day (in the morning)   -- Do not drink alcoholic beverages when taking this medication.  It is very important that you take or use this exactly as directed.  Do not skip doses or discontinue unless directed by your doctor.  Keep in refrigerator.  Do not freeze.

## 2018-11-23 NOTE — DISCHARGE NOTE ADULT - HOSPITAL COURSE
68 Roberto speaking F PMHx ESRD (M/W/F), CAD/MI S/P stent (2007), DM2 presents with fever and admitted with severe sepsis secondary to acute choledocholithiasis S/P percutaneous cholecystostomy tube 10/30 and ESBL E. coli bacteremia S/P IV Ertapenem; also developed A-fIb with RVR, improved now. Repeat CT suggestion of choledocholithiasis with a 5 mm stone suggested in the CBD, S/P ERCP on 11/14, stone unable to be removed.      Septic shock - S/P Ertapenem. ID consulted. Stable, F/U outpatient PCP   Cholangitis concurrent with and due to calculus of gallbladder - Gastroenterology consulted. S/P ERCP 11/19 stone removal CBD & stent placement. Stable, F/U outpatient GI in 2-3 weeks for repeat ERCP and stent removal.  CAD (coronary artery disease) S/P stent in 2007 - continue ASA, Plavix, Imdur. Stable. F/U outpatient PCP   ESRD (end stage renal disease) on dialysis - Nephro consulted. Continue HD M/W/F. Stable, F/U outpatient Nephro  Hypertension - continue BB, Isordil, Nifedipine. BP stable. Stable, F/U outpatient PCP  Diabetes - HgbA1c 9.7. Endocrinology consulted. Stable, F/U outpatient PCP.    PT recommended rehab, refused, home with home PT  As per medical attending, pt medically stable for discharge. 68 Roberto speaking F PMHx ESRD (M/W/F), CAD/MI S/P stent (2007), DM2 presents with fever and admitted with severe sepsis secondary to acute choledocholithiasis S/P percutaneous cholecystostomy tube 10/30 and ESBL E. coli bacteremia S/P IV Ertapenem; also developed A-fIb with RVR, improved now. Repeat CT suggestion of choledocholithiasis with a 5 mm stone suggested in the CBD, S/P ERCP on 11/14, stone unable to be removed.    Septic shock - S/P Ertapenem. ID consulted. Stable, F/U outpatient PCP   Cholangitis concurrent with and due to calculus of gallbladder - Gastroenterology consulted. S/P ERCP 11/19 stone removal CBD & stent placement. Stable, F/U outpatient GI in 2-3 weeks for repeat ERCP and stent removal.  CAD (coronary artery disease) S/P stent in 2007 - continue ASA, Plavix, Imdur. Stable. F/U outpatient PCP   ESRD (end stage renal disease) on dialysis - Nephro consulted. Continue HD M/W/F. Stable, F/U outpatient Nephro  Hypertension - continue BB, Isordil, Nifedipine. BP stable. Stable, F/U outpatient PCP  Diabetes - HgbA1c 9.7. Endocrinology consulted. Stable, F/U outpatient PCP.    PT recommended rehab, refused, home with home PT  As per medical attending, pt medically stable for discharge.

## 2018-11-23 NOTE — PROGRESS NOTE ADULT - REASON FOR ADMISSION
Transfer for higher level of care
Failed ERCP
Transfer for higher level of care

## 2018-11-24 RX ORDER — ISOSORBIDE MONONITRATE 60 MG/1
3 TABLET, EXTENDED RELEASE ORAL
Qty: 90 | Refills: 0 | OUTPATIENT
Start: 2018-11-24 | End: 2018-12-23

## 2018-11-24 RX ORDER — INSULIN GLARGINE 100 [IU]/ML
7 INJECTION, SOLUTION SUBCUTANEOUS
Qty: 0 | Refills: 0 | DISCHARGE
Start: 2018-11-24

## 2018-11-24 RX ORDER — INSULIN GLARGINE 100 [IU]/ML
8 INJECTION, SOLUTION SUBCUTANEOUS
Qty: 0 | Refills: 0 | DISCHARGE
Start: 2018-11-24

## 2018-11-24 RX ORDER — ESOMEPRAZOLE MAGNESIUM 40 MG/1
1 CAPSULE, DELAYED RELEASE ORAL
Qty: 30 | Refills: 0 | OUTPATIENT
Start: 2018-11-24 | End: 2018-12-23

## 2018-11-24 RX ORDER — INSULIN GLARGINE 100 [IU]/ML
7 INJECTION, SOLUTION SUBCUTANEOUS
Qty: 1 | Refills: 0
Start: 2018-11-24

## 2018-11-24 RX ORDER — INSULIN GLARGINE 100 [IU]/ML
20 INJECTION, SOLUTION SUBCUTANEOUS
Qty: 0 | Refills: 0 | DISCHARGE
Start: 2018-11-24

## 2018-11-25 LAB
HBV DNA # SERPL NAA+PROBE: NOT DETECTED IU/ML — SIGNIFICANT CHANGE UP
HBV DNA SERPL NAA+PROBE-LOG#: SIGNIFICANT CHANGE UP LOGIU/ML

## 2018-11-26 ENCOUNTER — INPATIENT (INPATIENT)
Facility: HOSPITAL | Age: 68
LOS: 10 days | Discharge: HOME | DRG: 441 | End: 2018-12-07
Attending: INTERNAL MEDICINE | Admitting: FAMILY MEDICINE
Payer: COMMERCIAL

## 2018-11-26 VITALS
SYSTOLIC BLOOD PRESSURE: 177 MMHG | DIASTOLIC BLOOD PRESSURE: 87 MMHG | WEIGHT: 179.9 LBS | TEMPERATURE: 98 F | OXYGEN SATURATION: 92 % | RESPIRATION RATE: 18 BRPM | HEART RATE: 74 BPM

## 2018-11-26 DIAGNOSIS — Z90.710 ACQUIRED ABSENCE OF BOTH CERVIX AND UTERUS: Chronic | ICD-10-CM

## 2018-11-26 DIAGNOSIS — R10.9 UNSPECIFIED ABDOMINAL PAIN: ICD-10-CM

## 2018-11-26 LAB
ALBUMIN SERPL ELPH-MCNC: 1.7 G/DL — LOW (ref 3.3–5)
ALP SERPL-CCNC: 265 U/L — HIGH (ref 40–120)
ALT FLD-CCNC: 12 U/L — SIGNIFICANT CHANGE UP (ref 12–78)
ANION GAP SERPL CALC-SCNC: 8 MMOL/L — SIGNIFICANT CHANGE UP (ref 5–17)
AST SERPL-CCNC: 34 U/L — SIGNIFICANT CHANGE UP (ref 15–37)
BASOPHILS # BLD AUTO: 0.03 K/UL — SIGNIFICANT CHANGE UP (ref 0–0.2)
BASOPHILS NFR BLD AUTO: 0.3 % — SIGNIFICANT CHANGE UP (ref 0–2)
BILIRUB SERPL-MCNC: 0.5 MG/DL — SIGNIFICANT CHANGE UP (ref 0.2–1.2)
BUN SERPL-MCNC: 26 MG/DL — HIGH (ref 7–23)
CALCIUM SERPL-MCNC: 8.5 MG/DL — SIGNIFICANT CHANGE UP (ref 8.5–10.1)
CHLORIDE SERPL-SCNC: 98 MMOL/L — SIGNIFICANT CHANGE UP (ref 96–108)
CO2 SERPL-SCNC: 27 MMOL/L — SIGNIFICANT CHANGE UP (ref 22–31)
CREAT SERPL-MCNC: 6.3 MG/DL — HIGH (ref 0.5–1.3)
EOSINOPHIL # BLD AUTO: 0.29 K/UL — SIGNIFICANT CHANGE UP (ref 0–0.5)
EOSINOPHIL NFR BLD AUTO: 2.8 % — SIGNIFICANT CHANGE UP (ref 0–6)
GLUCOSE SERPL-MCNC: 171 MG/DL — HIGH (ref 70–99)
HCT VFR BLD CALC: 30.6 % — LOW (ref 34.5–45)
HGB BLD-MCNC: 9.3 G/DL — LOW (ref 11.5–15.5)
IMM GRANULOCYTES NFR BLD AUTO: 0.6 % — SIGNIFICANT CHANGE UP (ref 0–1.5)
LACTATE SERPL-SCNC: 0.8 MMOL/L — SIGNIFICANT CHANGE UP (ref 0.7–2)
LIDOCAIN IGE QN: 489 U/L — HIGH (ref 73–393)
LYMPHOCYTES # BLD AUTO: 2.09 K/UL — SIGNIFICANT CHANGE UP (ref 1–3.3)
LYMPHOCYTES # BLD AUTO: 20.3 % — SIGNIFICANT CHANGE UP (ref 13–44)
MCHC RBC-ENTMCNC: 27 PG — SIGNIFICANT CHANGE UP (ref 27–34)
MCHC RBC-ENTMCNC: 30.4 GM/DL — LOW (ref 32–36)
MCV RBC AUTO: 89 FL — SIGNIFICANT CHANGE UP (ref 80–100)
MONOCYTES # BLD AUTO: 0.74 K/UL — SIGNIFICANT CHANGE UP (ref 0–0.9)
MONOCYTES NFR BLD AUTO: 7.2 % — SIGNIFICANT CHANGE UP (ref 2–14)
NEUTROPHILS # BLD AUTO: 7.11 K/UL — SIGNIFICANT CHANGE UP (ref 1.8–7.4)
NEUTROPHILS NFR BLD AUTO: 68.8 % — SIGNIFICANT CHANGE UP (ref 43–77)
NRBC # BLD: 0 /100 WBCS — SIGNIFICANT CHANGE UP (ref 0–0)
PLATELET # BLD AUTO: 348 K/UL — SIGNIFICANT CHANGE UP (ref 150–400)
POTASSIUM SERPL-MCNC: 4.1 MMOL/L — SIGNIFICANT CHANGE UP (ref 3.5–5.3)
POTASSIUM SERPL-SCNC: 4.1 MMOL/L — SIGNIFICANT CHANGE UP (ref 3.5–5.3)
PROT SERPL-MCNC: 7.7 G/DL — SIGNIFICANT CHANGE UP (ref 6–8.3)
RBC # BLD: 3.44 M/UL — LOW (ref 3.8–5.2)
RBC # FLD: 16.6 % — HIGH (ref 10.3–14.5)
SODIUM SERPL-SCNC: 133 MMOL/L — LOW (ref 135–145)
WBC # BLD: 10.32 K/UL — SIGNIFICANT CHANGE UP (ref 3.8–10.5)
WBC # FLD AUTO: 10.32 K/UL — SIGNIFICANT CHANGE UP (ref 3.8–10.5)

## 2018-11-26 PROCEDURE — 99222 1ST HOSP IP/OBS MODERATE 55: CPT | Mod: AI

## 2018-11-26 PROCEDURE — 74177 CT ABD & PELVIS W/CONTRAST: CPT | Mod: 26

## 2018-11-26 PROCEDURE — 93010 ELECTROCARDIOGRAM REPORT: CPT

## 2018-11-26 PROCEDURE — 99283 EMERGENCY DEPT VISIT LOW MDM: CPT

## 2018-11-26 RX ORDER — INSULIN LISPRO 100/ML
VIAL (ML) SUBCUTANEOUS
Qty: 0 | Refills: 0 | Status: DISCONTINUED | OUTPATIENT
Start: 2018-11-26 | End: 2018-12-07

## 2018-11-26 RX ORDER — NIFEDIPINE 30 MG
60 TABLET, EXTENDED RELEASE 24 HR ORAL DAILY
Qty: 0 | Refills: 0 | Status: DISCONTINUED | OUTPATIENT
Start: 2018-11-26 | End: 2018-12-07

## 2018-11-26 RX ORDER — DEXTROSE 50 % IN WATER 50 %
12.5 SYRINGE (ML) INTRAVENOUS ONCE
Qty: 0 | Refills: 0 | Status: DISCONTINUED | OUTPATIENT
Start: 2018-11-26 | End: 2018-12-07

## 2018-11-26 RX ORDER — SODIUM CHLORIDE 9 MG/ML
1000 INJECTION, SOLUTION INTRAVENOUS
Qty: 0 | Refills: 0 | Status: DISCONTINUED | OUTPATIENT
Start: 2018-11-26 | End: 2018-12-07

## 2018-11-26 RX ORDER — ASPIRIN/CALCIUM CARB/MAGNESIUM 324 MG
81 TABLET ORAL DAILY
Qty: 0 | Refills: 0 | Status: DISCONTINUED | OUTPATIENT
Start: 2018-11-26 | End: 2018-12-07

## 2018-11-26 RX ORDER — DEXTROSE 50 % IN WATER 50 %
25 SYRINGE (ML) INTRAVENOUS ONCE
Qty: 0 | Refills: 0 | Status: DISCONTINUED | OUTPATIENT
Start: 2018-11-26 | End: 2018-12-07

## 2018-11-26 RX ORDER — PIPERACILLIN AND TAZOBACTAM 4; .5 G/20ML; G/20ML
3.38 INJECTION, POWDER, LYOPHILIZED, FOR SOLUTION INTRAVENOUS ONCE
Qty: 0 | Refills: 0 | Status: COMPLETED | OUTPATIENT
Start: 2018-11-26 | End: 2018-11-26

## 2018-11-26 RX ORDER — HEPARIN SODIUM 5000 [USP'U]/ML
5000 INJECTION INTRAVENOUS; SUBCUTANEOUS EVERY 12 HOURS
Qty: 0 | Refills: 0 | Status: DISCONTINUED | OUTPATIENT
Start: 2018-11-26 | End: 2018-11-29

## 2018-11-26 RX ORDER — ATORVASTATIN CALCIUM 80 MG/1
80 TABLET, FILM COATED ORAL AT BEDTIME
Qty: 0 | Refills: 0 | Status: DISCONTINUED | OUTPATIENT
Start: 2018-11-26 | End: 2018-12-07

## 2018-11-26 RX ORDER — CLOPIDOGREL BISULFATE 75 MG/1
75 TABLET, FILM COATED ORAL DAILY
Qty: 0 | Refills: 0 | Status: DISCONTINUED | OUTPATIENT
Start: 2018-11-26 | End: 2018-12-07

## 2018-11-26 RX ORDER — GLUCAGON INJECTION, SOLUTION 0.5 MG/.1ML
1 INJECTION, SOLUTION SUBCUTANEOUS ONCE
Qty: 0 | Refills: 0 | Status: DISCONTINUED | OUTPATIENT
Start: 2018-11-26 | End: 2018-12-07

## 2018-11-26 RX ORDER — INSULIN LISPRO 100/ML
VIAL (ML) SUBCUTANEOUS AT BEDTIME
Qty: 0 | Refills: 0 | Status: DISCONTINUED | OUTPATIENT
Start: 2018-11-26 | End: 2018-12-07

## 2018-11-26 RX ORDER — GABAPENTIN 400 MG/1
300 CAPSULE ORAL
Qty: 0 | Refills: 0 | Status: DISCONTINUED | OUTPATIENT
Start: 2018-11-26 | End: 2018-12-07

## 2018-11-26 RX ORDER — ENOXAPARIN SODIUM 100 MG/ML
30 INJECTION SUBCUTANEOUS EVERY 24 HOURS
Qty: 0 | Refills: 0 | Status: DISCONTINUED | OUTPATIENT
Start: 2018-11-26 | End: 2018-11-26

## 2018-11-26 RX ORDER — DOCUSATE SODIUM 100 MG
100 CAPSULE ORAL DAILY
Qty: 0 | Refills: 0 | Status: DISCONTINUED | OUTPATIENT
Start: 2018-11-26 | End: 2018-11-28

## 2018-11-26 RX ORDER — SENNA PLUS 8.6 MG/1
2 TABLET ORAL AT BEDTIME
Qty: 0 | Refills: 0 | Status: DISCONTINUED | OUTPATIENT
Start: 2018-11-26 | End: 2018-12-03

## 2018-11-26 RX ORDER — ONDANSETRON 8 MG/1
4 TABLET, FILM COATED ORAL EVERY 6 HOURS
Qty: 0 | Refills: 0 | Status: DISCONTINUED | OUTPATIENT
Start: 2018-11-26 | End: 2018-12-07

## 2018-11-26 RX ORDER — METOPROLOL TARTRATE 50 MG
50 TABLET ORAL
Qty: 0 | Refills: 0 | Status: DISCONTINUED | OUTPATIENT
Start: 2018-11-26 | End: 2018-12-07

## 2018-11-26 RX ORDER — ISOSORBIDE MONONITRATE 60 MG/1
30 TABLET, EXTENDED RELEASE ORAL DAILY
Qty: 0 | Refills: 0 | Status: DISCONTINUED | OUTPATIENT
Start: 2018-11-26 | End: 2018-12-07

## 2018-11-26 RX ORDER — SODIUM CHLORIDE 9 MG/ML
1000 INJECTION, SOLUTION INTRAVENOUS
Qty: 0 | Refills: 0 | Status: DISCONTINUED | OUTPATIENT
Start: 2018-11-26 | End: 2018-11-26

## 2018-11-26 RX ORDER — SODIUM CHLORIDE 9 MG/ML
1000 INJECTION INTRAMUSCULAR; INTRAVENOUS; SUBCUTANEOUS
Qty: 0 | Refills: 0 | Status: DISCONTINUED | OUTPATIENT
Start: 2018-11-26 | End: 2018-11-28

## 2018-11-26 RX ORDER — DEXTROSE 50 % IN WATER 50 %
15 SYRINGE (ML) INTRAVENOUS ONCE
Qty: 0 | Refills: 0 | Status: DISCONTINUED | OUTPATIENT
Start: 2018-11-26 | End: 2018-12-07

## 2018-11-26 RX ADMIN — PIPERACILLIN AND TAZOBACTAM 200 GRAM(S): 4; .5 INJECTION, POWDER, LYOPHILIZED, FOR SOLUTION INTRAVENOUS at 20:31

## 2018-11-26 RX ADMIN — SODIUM CHLORIDE 75 MILLILITER(S): 9 INJECTION INTRAMUSCULAR; INTRAVENOUS; SUBCUTANEOUS at 23:30

## 2018-11-26 NOTE — ED ADULT NURSE NOTE - OBJECTIVE STATEMENT
69 y/o female presents to ED by EMS reporting lower abdominal pain that began 1 hour into dialysis after eating lunch. Reports pain is intermittent. +N/V. Reports three bowel movements today. Denies fever. Reports she was recently admitted for obstructing gallstone with stent placement and discharged Friday where she was supposed to go to rehab but refused. Abdomen is soft and tender. Bruising from Lovenox injections noted on lower abdomen. Denies dysuria but reports urinary frequency today. Skin is warm, dry and appropriate color for ethnicity. Capillary refill less than 2 seconds. VSS.

## 2018-11-26 NOTE — ED ADULT NURSE NOTE - NSIMPLEMENTINTERV_GEN_ALL_ED
Implemented All Universal Safety Interventions:  [place] to call system. Call bell, personal items and telephone within reach. Instruct patient to call for assistance. Room bathroom lighting operational. Non-slip footwear when patient is off stretcher. Physically safe environment: no spills, clutter or unnecessary equipment. Stretcher in lowest position, wheels locked, appropriate side rails in place.

## 2018-11-26 NOTE — ED PROVIDER NOTE - MEDICAL DECISION MAKING DETAILS
pt with lower abd pain during hd after one hour, hd stopped, pt appears well, check labs, ct abd, renal consult pt with lower abd pain during hd after one hour, hd stopped, pt appears well, check labs, ct abd, renal consult--admit, abx, gi consult

## 2018-11-26 NOTE — H&P ADULT - NSHPPHYSICALEXAM_GEN_ALL_CORE
gen: nad  cvs:s1 s2  resp: ctabl  gi: obese, lower abd pain  neuro: intact  psy: no anxiety  ent: mosit  skin: pallor

## 2018-11-26 NOTE — ED PROVIDER NOTE - CADM POA PRESS ULCER
Please see note below for Ibuprofen 800 mg.  Not on current medication list.  Last signed: 5/7/18 #90, 1 R  Medication discontinued on 6/21/18 for reason therapy completed.  PCP Heather.  Please advise.  Thank you.   No

## 2018-11-26 NOTE — H&P ADULT - HISTORY OF PRESENT ILLNESS
this si a 70 y/o f with pmhx of htn, hld, obesity, HD dependant, DM 2 on basilglar, s/p cholecystotomy tube removal with biliary stent placement at [facility] p/w abdominal pain to ER. She developed abd pain while having HD and they had to stop that and send her to ED. Pt narrates that her pain is 4/10 in lower abdomen and non radiating. she was found ot have multiple liver lesion concening for liver abscesses and lipas eof 489 concerning for acute pancreatitis, GI was called for evaluation. pt ha sno wbc elevationan dno fever  acc by daughter in law

## 2018-11-27 DIAGNOSIS — K75.0 ABSCESS OF LIVER: ICD-10-CM

## 2018-11-27 DIAGNOSIS — R10.9 UNSPECIFIED ABDOMINAL PAIN: ICD-10-CM

## 2018-11-27 DIAGNOSIS — R89.9 UNSPECIFIED ABNORMAL FINDING IN SPECIMENS FROM OTHER ORGANS, SYSTEMS AND TISSUES: ICD-10-CM

## 2018-11-27 DIAGNOSIS — D64.9 ANEMIA, UNSPECIFIED: ICD-10-CM

## 2018-11-27 LAB
ALBUMIN SERPL ELPH-MCNC: 1.8 G/DL — LOW (ref 3.3–5)
ALP SERPL-CCNC: 234 U/L — HIGH (ref 40–120)
ALT FLD-CCNC: 12 U/L — SIGNIFICANT CHANGE UP (ref 12–78)
ANION GAP SERPL CALC-SCNC: 9 MMOL/L — SIGNIFICANT CHANGE UP (ref 5–17)
AST SERPL-CCNC: 26 U/L — SIGNIFICANT CHANGE UP (ref 15–37)
BILIRUB SERPL-MCNC: 0.5 MG/DL — SIGNIFICANT CHANGE UP (ref 0.2–1.2)
BUN SERPL-MCNC: 28 MG/DL — HIGH (ref 7–23)
CALCIUM SERPL-MCNC: 8.5 MG/DL — SIGNIFICANT CHANGE UP (ref 8.5–10.1)
CHLORIDE SERPL-SCNC: 99 MMOL/L — SIGNIFICANT CHANGE UP (ref 96–108)
CO2 SERPL-SCNC: 29 MMOL/L — SIGNIFICANT CHANGE UP (ref 22–31)
CREAT SERPL-MCNC: 7.1 MG/DL — HIGH (ref 0.5–1.3)
GLUCOSE SERPL-MCNC: 106 MG/DL — HIGH (ref 70–99)
HCT VFR BLD CALC: 28 % — LOW (ref 34.5–45)
HGB BLD-MCNC: 8.3 G/DL — LOW (ref 11.5–15.5)
MCHC RBC-ENTMCNC: 26.8 PG — LOW (ref 27–34)
MCHC RBC-ENTMCNC: 29.6 GM/DL — LOW (ref 32–36)
MCV RBC AUTO: 90.3 FL — SIGNIFICANT CHANGE UP (ref 80–100)
NRBC # BLD: 0 /100 WBCS — SIGNIFICANT CHANGE UP (ref 0–0)
PLATELET # BLD AUTO: 357 K/UL — SIGNIFICANT CHANGE UP (ref 150–400)
POTASSIUM SERPL-MCNC: 3.7 MMOL/L — SIGNIFICANT CHANGE UP (ref 3.5–5.3)
POTASSIUM SERPL-SCNC: 3.7 MMOL/L — SIGNIFICANT CHANGE UP (ref 3.5–5.3)
PROT SERPL-MCNC: 7.4 G/DL — SIGNIFICANT CHANGE UP (ref 6–8.3)
RBC # BLD: 3.1 M/UL — LOW (ref 3.8–5.2)
RBC # FLD: 16.7 % — HIGH (ref 10.3–14.5)
SODIUM SERPL-SCNC: 137 MMOL/L — SIGNIFICANT CHANGE UP (ref 135–145)
WBC # BLD: 8.28 K/UL — SIGNIFICANT CHANGE UP (ref 3.8–10.5)
WBC # FLD AUTO: 8.28 K/UL — SIGNIFICANT CHANGE UP (ref 3.8–10.5)

## 2018-11-27 PROCEDURE — 71045 X-RAY EXAM CHEST 1 VIEW: CPT | Mod: 26

## 2018-11-27 PROCEDURE — 74181 MRI ABDOMEN W/O CONTRAST: CPT | Mod: 26

## 2018-11-27 PROCEDURE — 99233 SBSQ HOSP IP/OBS HIGH 50: CPT

## 2018-11-27 RX ORDER — LACTOBACILLUS ACIDOPHILUS 100MM CELL
1 CAPSULE ORAL
Qty: 0 | Refills: 0 | Status: DISCONTINUED | OUTPATIENT
Start: 2018-11-27 | End: 2018-12-07

## 2018-11-27 RX ORDER — PANTOPRAZOLE SODIUM 20 MG/1
40 TABLET, DELAYED RELEASE ORAL
Qty: 0 | Refills: 0 | Status: DISCONTINUED | OUTPATIENT
Start: 2018-11-27 | End: 2018-12-07

## 2018-11-27 RX ORDER — ERTAPENEM SODIUM 1 G/1
INJECTION, POWDER, LYOPHILIZED, FOR SOLUTION INTRAMUSCULAR; INTRAVENOUS
Qty: 0 | Refills: 0 | Status: DISCONTINUED | OUTPATIENT
Start: 2018-11-27 | End: 2018-11-28

## 2018-11-27 RX ORDER — PIPERACILLIN AND TAZOBACTAM 4; .5 G/20ML; G/20ML
3.38 INJECTION, POWDER, LYOPHILIZED, FOR SOLUTION INTRAVENOUS EVERY 12 HOURS
Qty: 0 | Refills: 0 | Status: DISCONTINUED | OUTPATIENT
Start: 2018-11-27 | End: 2018-11-27

## 2018-11-27 RX ORDER — CHLORHEXIDINE GLUCONATE 213 G/1000ML
1 SOLUTION TOPICAL DAILY
Qty: 0 | Refills: 0 | Status: DISCONTINUED | OUTPATIENT
Start: 2018-11-27 | End: 2018-12-07

## 2018-11-27 RX ORDER — ERTAPENEM SODIUM 1 G/1
500 INJECTION, POWDER, LYOPHILIZED, FOR SOLUTION INTRAMUSCULAR; INTRAVENOUS EVERY 24 HOURS
Qty: 0 | Refills: 0 | Status: DISCONTINUED | OUTPATIENT
Start: 2018-11-28 | End: 2018-11-28

## 2018-11-27 RX ORDER — ERTAPENEM SODIUM 1 G/1
500 INJECTION, POWDER, LYOPHILIZED, FOR SOLUTION INTRAMUSCULAR; INTRAVENOUS ONCE
Qty: 0 | Refills: 0 | Status: COMPLETED | OUTPATIENT
Start: 2018-11-27 | End: 2018-11-27

## 2018-11-27 RX ORDER — SIMETHICONE 80 MG/1
80 TABLET, CHEWABLE ORAL THREE TIMES A DAY
Qty: 0 | Refills: 0 | Status: DISCONTINUED | OUTPATIENT
Start: 2018-11-27 | End: 2018-11-28

## 2018-11-27 RX ADMIN — CLOPIDOGREL BISULFATE 75 MILLIGRAM(S): 75 TABLET, FILM COATED ORAL at 11:02

## 2018-11-27 RX ADMIN — Medication 50 MILLIGRAM(S): at 05:19

## 2018-11-27 RX ADMIN — Medication 50 MILLIGRAM(S): at 17:22

## 2018-11-27 RX ADMIN — Medication 100 MILLIGRAM(S): at 11:02

## 2018-11-27 RX ADMIN — GABAPENTIN 300 MILLIGRAM(S): 400 CAPSULE ORAL at 05:19

## 2018-11-27 RX ADMIN — Medication 1: at 11:40

## 2018-11-27 RX ADMIN — Medication 1 TABLET(S): at 13:05

## 2018-11-27 RX ADMIN — Medication 1 TABLET(S): at 17:23

## 2018-11-27 RX ADMIN — SIMETHICONE 80 MILLIGRAM(S): 80 TABLET, CHEWABLE ORAL at 21:41

## 2018-11-27 RX ADMIN — GABAPENTIN 300 MILLIGRAM(S): 400 CAPSULE ORAL at 17:23

## 2018-11-27 RX ADMIN — ERTAPENEM SODIUM 100 MILLIGRAM(S): 1 INJECTION, POWDER, LYOPHILIZED, FOR SOLUTION INTRAMUSCULAR; INTRAVENOUS at 13:03

## 2018-11-27 RX ADMIN — CHLORHEXIDINE GLUCONATE 1 APPLICATION(S): 213 SOLUTION TOPICAL at 18:45

## 2018-11-27 RX ADMIN — SENNA PLUS 2 TABLET(S): 8.6 TABLET ORAL at 21:40

## 2018-11-27 RX ADMIN — ATORVASTATIN CALCIUM 80 MILLIGRAM(S): 80 TABLET, FILM COATED ORAL at 21:40

## 2018-11-27 RX ADMIN — ISOSORBIDE MONONITRATE 30 MILLIGRAM(S): 60 TABLET, EXTENDED RELEASE ORAL at 11:02

## 2018-11-27 RX ADMIN — Medication 81 MILLIGRAM(S): at 11:02

## 2018-11-27 RX ADMIN — Medication 60 MILLIGRAM(S): at 05:18

## 2018-11-27 RX ADMIN — SIMETHICONE 80 MILLIGRAM(S): 80 TABLET, CHEWABLE ORAL at 13:10

## 2018-11-27 RX ADMIN — HEPARIN SODIUM 5000 UNIT(S): 5000 INJECTION INTRAVENOUS; SUBCUTANEOUS at 05:19

## 2018-11-27 RX ADMIN — HEPARIN SODIUM 5000 UNIT(S): 5000 INJECTION INTRAVENOUS; SUBCUTANEOUS at 17:23

## 2018-11-27 NOTE — PROGRESS NOTE ADULT - SUBJECTIVE AND OBJECTIVE BOX
Patient is a 68y old  Female who presents with a chief complaint of abdominal pain (27 Nov 2018 16:22)      INTERVAL HPI/OVERNIGHT EVENTS: Pt states she has lower abdominal pain. Pt also states she has not been able to urinate today while urinates ~5 times per day at baseline. Pt denies fever, chills, nausea, vomiting, CP, SOB, palpitations, flank pain.     MEDICATIONS  (STANDING):  aspirin enteric coated 81 milliGRAM(s) Oral daily  atorvastatin 80 milliGRAM(s) Oral at bedtime  chlorhexidine 2% Cloths 1 Application(s) Topical daily  clopidogrel Tablet 75 milliGRAM(s) Oral daily  dextrose 5%. 1000 milliLiter(s) (50 mL/Hr) IV Continuous <Continuous>  dextrose 50% Injectable 12.5 Gram(s) IV Push once  dextrose 50% Injectable 25 Gram(s) IV Push once  dextrose 50% Injectable 25 Gram(s) IV Push once  docusate sodium 100 milliGRAM(s) Oral daily  ertapenem  IVPB      ertapenem  IVPB 500 milliGRAM(s) IV Intermittent every 24 hours  gabapentin 300 milliGRAM(s) Oral two times a day  heparin  Injectable 5000 Unit(s) SubCutaneous every 12 hours  insulin lispro (HumaLOG) corrective regimen sliding scale   SubCutaneous three times a day before meals  insulin lispro (HumaLOG) corrective regimen sliding scale   SubCutaneous at bedtime  isosorbide   mononitrate ER Tablet (IMDUR) 30 milliGRAM(s) Oral daily  lactobacillus acidophilus 1 Tablet(s) Oral three times a day with meals  metoprolol tartrate 50 milliGRAM(s) Oral two times a day  NIFEdipine XL 60 milliGRAM(s) Oral daily  pantoprazole    Tablet 40 milliGRAM(s) Oral before breakfast  senna 2 Tablet(s) Oral at bedtime  simethicone 80 milliGRAM(s) Chew three times a day  sodium chloride 0.9%. 1000 milliLiter(s) (75 mL/Hr) IV Continuous <Continuous>    MEDICATIONS  (PRN):  dextrose 40% Gel 15 Gram(s) Oral once PRN Blood Glucose LESS THAN 70 milliGRAM(s)/deciliter  glucagon  Injectable 1 milliGRAM(s) IntraMuscular once PRN Glucose LESS THAN 70 milligrams/deciliter  ondansetron Injectable 4 milliGRAM(s) IV Push every 6 hours PRN Nausea      Allergies    No Known Drug Allergies  Purell (Rash)    Intolerances        REVIEW OF SYSTEMS:  CONSTITUTIONAL: No fever or chills  HEENT:  No headache, no sore throat  RESPIRATORY: No cough, wheezing, or shortness of breath  CARDIOVASCULAR: No chest pain, palpitations, or leg swelling  GASTROINTESTINAL: +abd pain, No nausea, vomiting, or diarrhea  GENITOURINARY: No urine output today (usually urinates 5x per day); No dysuria, frequency, or hematuria  NEUROLOGICAL: no focal weakness or dizziness  MUSCULOSKELETAL: no myalgias     Vital Signs Last 24 Hrs  T(C): 37.2 (27 Nov 2018 21:13), Max: 37.2 (27 Nov 2018 21:13)  T(F): 98.9 (27 Nov 2018 21:13), Max: 98.9 (27 Nov 2018 21:13)  HR: 79 (27 Nov 2018 21:13) (73 - 79)  BP: 160/76 (27 Nov 2018 21:13) (151/67 - 160/76)  BP(mean): --  RR: 18 (27 Nov 2018 21:13) (18 - 19)  SpO2: 95% (27 Nov 2018 21:13) (92% - 95%)    PHYSICAL EXAM:  GENERAL: NAD, obese  HEENT:  anicteric, moist mucous membranes  CHEST/LUNG:  CTA b/l, no rales, wheezes, or rhonchi ; R chest permacath  HEART:  RRR, S1, S2  ABDOMEN:  BS+, soft, mild tenderness to palpation without guarding in the suprapubic region, nondistended  EXTREMITIES: no edema, cyanosis, or calf tenderness  NERVOUS SYSTEM: AA&Ox3    LABS:                        8.3    8.28  )-----------( 357      ( 27 Nov 2018 08:55 )             28.0     CBC Full  -  ( 27 Nov 2018 08:55 )  WBC Count : 8.28 K/uL  Hemoglobin : 8.3 g/dL  Hematocrit : 28.0 %  Platelet Count - Automated : 357 K/uL  Mean Cell Volume : 90.3 fl  Mean Cell Hemoglobin : 26.8 pg  Mean Cell Hemoglobin Concentration : 29.6 gm/dL  Auto Neutrophil # : x  Auto Lymphocyte # : x  Auto Monocyte # : x  Auto Eosinophil # : x  Auto Basophil # : x  Auto Neutrophil % : x  Auto Lymphocyte % : x  Auto Monocyte % : x  Auto Eosinophil % : x  Auto Basophil % : x    27 Nov 2018 08:55    137    |  99     |  28     ----------------------------<  106    3.7     |  29     |  7.10     Ca    8.5        27 Nov 2018 08:55    TPro  7.4    /  Alb  1.8    /  TBili  0.5    /  DBili  x      /  AST  26     /  ALT  12     /  AlkPhos  234    27 Nov 2018 08:55        CAPILLARY BLOOD GLUCOSE      POCT Blood Glucose.: 192 mg/dL (27 Nov 2018 21:29)  POCT Blood Glucose.: 135 mg/dL (27 Nov 2018 16:45)  POCT Blood Glucose.: 184 mg/dL (27 Nov 2018 11:30)  POCT Blood Glucose.: 124 mg/dL (27 Nov 2018 07:43)        Culture - Blood (collected 11-27-18 @ 00:16)  Source: .Blood Blood  Preliminary Report (11-28-18 @ 01:05):    No growth to date.    Culture - Blood (collected 11-27-18 @ 00:16)  Source: .Blood Blood  Preliminary Report (11-28-18 @ 01:05):    No growth to date.        RADIOLOGY & ADDITIONAL TESTS:  Limited by lack of IV contrast and scan artifact as described.  Gallbladder appearance suggestive of acute, or acute on chronic calculus cholecystitis. No biliary dilatation or obvious common duct stone.  T2 hyperintense lesions probable small abscesses.  Limited by lack of IV contrast and scan artifact as described.  Gallbladder appearance suggestive of acute, or acute on chronic calculus cholecystitis. No biliary dilatation or obvious common duct stone.  T2 hyperintense lesions probable small abscesses.    Personally reviewed.     Consultant(s) Notes Reviewed:  [x] YES  [ ] NO Patient is a 68y old  Female who presents with a chief complaint of abdominal pain (27 Nov 2018 16:22)    INTERVAL HPI/OVERNIGHT EVENTS: Pt states she has lower abdominal pain. Pt also states she has not been able to urinate today while urinates ~5 times per day at baseline. Pt denies fever, chills, nausea, vomiting, CP, SOB, palpitations, flank pain.     MEDICATIONS  (STANDING):  aspirin enteric coated 81 milliGRAM(s) Oral daily  atorvastatin 80 milliGRAM(s) Oral at bedtime  chlorhexidine 2% Cloths 1 Application(s) Topical daily  clopidogrel Tablet 75 milliGRAM(s) Oral daily  dextrose 5%. 1000 milliLiter(s) (50 mL/Hr) IV Continuous <Continuous>  dextrose 50% Injectable 12.5 Gram(s) IV Push once  dextrose 50% Injectable 25 Gram(s) IV Push once  dextrose 50% Injectable 25 Gram(s) IV Push once  docusate sodium 100 milliGRAM(s) Oral daily  ertapenem  IVPB      ertapenem  IVPB 500 milliGRAM(s) IV Intermittent every 24 hours  gabapentin 300 milliGRAM(s) Oral two times a day  heparin  Injectable 5000 Unit(s) SubCutaneous every 12 hours  insulin lispro (HumaLOG) corrective regimen sliding scale   SubCutaneous three times a day before meals  insulin lispro (HumaLOG) corrective regimen sliding scale   SubCutaneous at bedtime  isosorbide   mononitrate ER Tablet (IMDUR) 30 milliGRAM(s) Oral daily  lactobacillus acidophilus 1 Tablet(s) Oral three times a day with meals  metoprolol tartrate 50 milliGRAM(s) Oral two times a day  NIFEdipine XL 60 milliGRAM(s) Oral daily  pantoprazole    Tablet 40 milliGRAM(s) Oral before breakfast  senna 2 Tablet(s) Oral at bedtime  simethicone 80 milliGRAM(s) Chew three times a day  sodium chloride 0.9%. 1000 milliLiter(s) (75 mL/Hr) IV Continuous <Continuous>    MEDICATIONS  (PRN):  dextrose 40% Gel 15 Gram(s) Oral once PRN Blood Glucose LESS THAN 70 milliGRAM(s)/deciliter  glucagon  Injectable 1 milliGRAM(s) IntraMuscular once PRN Glucose LESS THAN 70 milligrams/deciliter  ondansetron Injectable 4 milliGRAM(s) IV Push every 6 hours PRN Nausea      Allergies    No Known Drug Allergies  Purell (Rash)    Intolerances        REVIEW OF SYSTEMS:  CONSTITUTIONAL: No fever or chills  HEENT:  No headache, no sore throat  RESPIRATORY: No cough, wheezing, or shortness of breath  CARDIOVASCULAR: No chest pain, palpitations, or leg swelling  GASTROINTESTINAL: +abd pain, No nausea, vomiting, or diarrhea  GENITOURINARY: No urine output today (usually urinates 5x per day); No dysuria, frequency, or hematuria  NEUROLOGICAL: no focal weakness or dizziness  MUSCULOSKELETAL: no myalgias     Vital Signs Last 24 Hrs  T(C): 37.2 (27 Nov 2018 21:13), Max: 37.2 (27 Nov 2018 21:13)  T(F): 98.9 (27 Nov 2018 21:13), Max: 98.9 (27 Nov 2018 21:13)  HR: 79 (27 Nov 2018 21:13) (73 - 79)  BP: 160/76 (27 Nov 2018 21:13) (151/67 - 160/76)  BP(mean): --  RR: 18 (27 Nov 2018 21:13) (18 - 19)  SpO2: 95% (27 Nov 2018 21:13) (92% - 95%)    PHYSICAL EXAM:  GENERAL: NAD, obese  HEENT:  anicteric, moist mucous membranes  CHEST/LUNG:  CTA b/l, no rales, wheezes, or rhonchi ; R chest permacath  HEART:  RRR, S1, S2  ABDOMEN:  BS+, soft, mild tenderness to palpation without guarding in the suprapubic region, nondistended  EXTREMITIES: no edema, cyanosis, or calf tenderness  NERVOUS SYSTEM: AA&Ox3    LABS:                        8.3    8.28  )-----------( 357      ( 27 Nov 2018 08:55 )             28.0     CBC Full  -  ( 27 Nov 2018 08:55 )  WBC Count : 8.28 K/uL  Hemoglobin : 8.3 g/dL  Hematocrit : 28.0 %  Platelet Count - Automated : 357 K/uL  Mean Cell Volume : 90.3 fl  Mean Cell Hemoglobin : 26.8 pg  Mean Cell Hemoglobin Concentration : 29.6 gm/dL  Auto Neutrophil # : x  Auto Lymphocyte # : x  Auto Monocyte # : x  Auto Eosinophil # : x  Auto Basophil # : x  Auto Neutrophil % : x  Auto Lymphocyte % : x  Auto Monocyte % : x  Auto Eosinophil % : x  Auto Basophil % : x    27 Nov 2018 08:55    137    |  99     |  28     ----------------------------<  106    3.7     |  29     |  7.10     Ca    8.5        27 Nov 2018 08:55    TPro  7.4    /  Alb  1.8    /  TBili  0.5    /  DBili  x      /  AST  26     /  ALT  12     /  AlkPhos  234    27 Nov 2018 08:55        CAPILLARY BLOOD GLUCOSE      POCT Blood Glucose.: 192 mg/dL (27 Nov 2018 21:29)  POCT Blood Glucose.: 135 mg/dL (27 Nov 2018 16:45)  POCT Blood Glucose.: 184 mg/dL (27 Nov 2018 11:30)  POCT Blood Glucose.: 124 mg/dL (27 Nov 2018 07:43)        Culture - Blood (collected 11-27-18 @ 00:16)  Source: .Blood Blood  Preliminary Report (11-28-18 @ 01:05):    No growth to date.    Culture - Blood (collected 11-27-18 @ 00:16)  Source: .Blood Blood  Preliminary Report (11-28-18 @ 01:05):    No growth to date.        RADIOLOGY & ADDITIONAL TESTS:  Limited by lack of IV contrast and scan artifact as described.  Gallbladder appearance suggestive of acute, or acute on chronic calculus cholecystitis. No biliary dilatation or obvious common duct stone.  T2 hyperintense lesions probable small abscesses.  Limited by lack of IV contrast and scan artifact as described.  Gallbladder appearance suggestive of acute, or acute on chronic calculus cholecystitis. No biliary dilatation or obvious common duct stone.  T2 hyperintense lesions probable small abscesses.    Personally reviewed.     Consultant(s) Notes Reviewed:  [x] YES  [ ] NO

## 2018-11-27 NOTE — CONSULT NOTE ADULT - ASSESSMENT
68 y/o f with pmhx of htn, hld, obesity, HD dependant, DM 2 on basilglar, s/p cholecystotomy tube removal with biliary stent placement at [facility] p/w abdominal pain to ER. gi consulted for liver abscesses on ct

## 2018-11-27 NOTE — CONSULT NOTE ADULT - SUBJECTIVE AND OBJECTIVE BOX
[facility], Division of Infectious Diseases  DEB Barbosa A. Lee    CLAUDIA, IRENE  68y, Female  [id number]    HPI--  5oF previously known to our group with ESBL E. coli septicemia, likely related to cholangitis, s/p percutaneous cholecystotomy, S/P ERCP with sphincterotomy with stone retrieval and stent placement. Patient was discharged home stable off antibiotics. She presents back to the hospital with low abdominal pain during dialysis. CT scan concerning for liver abscess x2, new since prior study; with MRI potentially consistent with this diagnosis as well (though limited due to lack of IV contrast). No fevers, chills, or rigors. No upper abdominal pain. No N/V. Complains now of feeling like she has to urinate, and also abdominal discomfort slighly better with flatulence. Patient also asking for ensure or equivalent (family translatign at patient request).    PMH/PSH--  ESRD (end stage renal disease) on dialysis  CAD (coronary artery disease)  Diabetes  CRF (chronic renal failure)  Hypertension  Pneumonia  Myocardial infarction  Hypertension  Diabetes  H/O: hysterectomy      Allergies-- NKDA      Medications--  Antibiotics: ertapenem  IVPB        Immunologic:   Other: aspirin enteric coated  atorvastatin  clopidogrel Tablet  dextrose 40% Gel PRN  dextrose 5%.  dextrose 50% Injectable  dextrose 50% Injectable  dextrose 50% Injectable  docusate sodium  gabapentin  glucagon  Injectable PRN  heparin  Injectable  insulin lispro (HumaLOG) corrective regimen sliding scale  insulin lispro (HumaLOG) corrective regimen sliding scale  isosorbide   mononitrate ER Tablet (IMDUR)  lactobacillus acidophilus  metoprolol tartrate  NIFEdipine XL  ondansetron Injectable PRN  pantoprazole    Tablet  senna  simethicone  sodium chloride 0.9%.      Social History--  EtOH: denies   Tobacco: denies   Drug Use: denies     Family/Marital History--  No pertinent family history in first degree relatives  Remainder not relevant to clinical concern.        Review of Systems:  A >=10-point review of systems was obtained.  Review of systems otherwise negative except as previously noted.    Physical Exam--  Vital Signs: T(F): 97.5 (11-27-18 @ 13:17), Max: 98 (11-26-18 @ 22:47)  HR: 73 (11-27-18 @ 13:17)  BP: 151/67 (11-27-18 @ 13:17)  RR: 19 (11-27-18 @ 13:17)  SpO2: 94% (11-27-18 @ 13:17)  Wt(kg): --  General: Nontoxic-appearing Female in no acute distress.  HEENT: AT/NC. PERRL. EOMI. Anicteric. Conjunctiva pink and moist. Oropharynx clear.  Neck: Not rigid. No sense of mass.  Nodes: None palpable.  Chest: HD cath C/D/I  Lungs: Diminished breath sounds bilaterally without rales, wheezing or rhonchi  Heart: Regular rate and rhythm. No Murmur. No rub. No gallop. No palpable thrill.  Abdomen: Bowel sounds present and normoactive. Soft. Nondistended. Mild low abdominal tenderness without guarding or rebound. No RUQT. No sense of mass. No organomegaly.  Back: No spinal tenderness. No costovertebral angle tenderness.   Extremities: No cyanosis or clubbing. No edema.   Skin: Warm. Dry. Good turgor. No rash. No vasculitic stigmata.  Psychiatric: Appropriate affect and mood for situation.         Laboratory & Imaging Data--  CBC                        8.3    8.28  )-----------( 357      ( 27 Nov 2018 08:55 )             28.0       Chemistries  11-27    137  |  99  |  28<H>  ----------------------------<  106<H>  3.7   |  29  |  7.10<H>    Ca    8.5      27 Nov 2018 08:55    TPro  7.4  /  Alb  1.8<L>  /  TBili  0.5  /  DBili  x   /  AST  26  /  ALT  12  /  AlkPhos  234<H>  11-27      Culture Data  None    CT (personally reviewed) < from: CT Abdomen and Pelvis w/ IV Cont (11.26.18 @ 18:54) >  IMPRESSION:    1. Interval development of rounded low-attenuation lesions scattered   throughout the liver suspicious for a developing abscesses. Recommend   further evaluation with a MRI examination with diffusion-weighted imaging.    2. Interval removal of the percutaneous cholecystostomy tube with   unchanged appearing cholecystitis.    3. Interval placement of a biliary stent with new pneumobilia compatible   with stent patency. The previously noted choledocholithiasis is no longer   or seen.    4. Other findings, as discussed.    < end of copied text >    < from: MR Abdomen No Cont (11.27.18 @ 15:06) >  Limited by lack of IV contrast and scan artifact as described.  Gallbladder appearance suggestive of acute, or acute on chronic calculus   cholecystitis. No biliary dilatation or obvious common duct stone.  T2 hyperintense lesions probable small abscesses.    < end of copied text >

## 2018-11-27 NOTE — CONSULT NOTE ADULT - PROBLEM SELECTOR RECOMMENDATION 9
recent admission for cholecystitis, sp drainage by ir  sp ercp with stone removal and cbd stent placement  CT now showing new liver lesions suspicious for developing abscesses  rec MR abd for further eval  surgical eval  id eval  cont abx  f/u cxs  trend lfts, fever curve  serial abd exams  bowel regimen  pain control  will follow recent admission for cholecystitis, sp drainage by ir  sp ercp with stone removal and cbd stent placement  CT now showing new liver lesions suspicious for developing abscesses  rec MR abd for further eval  surgical eval  id eval  cont abx  f/u cxs  trend lfts, fever curve  serial abd exams  bowel regimen  pain control  further recs pending above  above plan dw attg, agreeable, will follow

## 2018-11-27 NOTE — CONSULT NOTE ADULT - SUBJECTIVE AND OBJECTIVE BOX
Chief Complaint:  Patient is a 68y old  Female who presents with a chief complaint of abdominal pain (26 Nov 2018 20:39)    ESRD (end stage renal disease) on dialysis  CAD (coronary artery disease)  Diabetes  CRF (chronic renal failure)  Hypertension  Pneumonia  Myocardial infarction  Hypertension  Diabetes  H/O: hysterectomy     HPI:  this si a 70 y/o f with pmhx of htn, hld, obesity, HD dependant, DM 2 on basilglar, s/p cholecystotomy tube removal with biliary stent placement at [facility] p/w abdominal pain to ER. She developed abd pain while having HD and they had to stop that and send her to ED. Pt narrates that her pain is 4/10 in lower abdomen and non radiating. she was found ot have multiple liver lesion concening for liver abscesses and lipas eof 489 concerning for acute pancreatitis, GI was called for evaluation. pt ha sno wbc elevationan dno fever  acc by daughter in law (26 Nov 2018 20:39)    gi consulted for liver abscesses. well known to service with recent admission for cholecystitis, sp drainage by ir, c/b cbd stone on ct, transferred to [facility] , sp ercp with stone removal and cbd stent placement. pt seen and examined. [facility]  [id number]. pt c/o lower abdominal pain, nausea, small episode of non bloody  vomiting yesterday, and 3 loose stools yesterday. currently states she feels week, denies current n/v/d, prior f/c, denies upper quadrant abd pain. per rn no n/v/d thus far today    recent vs/labs/imaging reviewed- afebrile  no leukocytosis  hgb trend noted- on ivf  alp downtrending 234  lactate wnl  lipase 489  ct a/p IMPRESSION:    1. Interval development of rounded low-attenuation lesions scattered   throughout the liver suspicious for a developing abscesses. Recommend   further evaluation with a MRI examination with diffusion-weighted imaging.    2. Interval removal of the percutaneous cholecystostomy tube with   unchanged appearing cholecystitis.    3. Interval placement of a biliary stent with new pneumobilia compatible   with stent patency. The previously noted choledocholithiasis is no longer   or seen.    4. Other findings, as discussed.      No Known Drug Allergies  Purell (Rash)      aspirin enteric coated 81 milliGRAM(s) Oral daily  atorvastatin 80 milliGRAM(s) Oral at bedtime  clopidogrel Tablet 75 milliGRAM(s) Oral daily  dextrose 40% Gel 15 Gram(s) Oral once PRN  dextrose 5%. 1000 milliLiter(s) IV Continuous <Continuous>  dextrose 50% Injectable 12.5 Gram(s) IV Push once  dextrose 50% Injectable 25 Gram(s) IV Push once  dextrose 50% Injectable 25 Gram(s) IV Push once  docusate sodium 100 milliGRAM(s) Oral daily  gabapentin 300 milliGRAM(s) Oral two times a day  glucagon  Injectable 1 milliGRAM(s) IntraMuscular once PRN  heparin  Injectable 5000 Unit(s) SubCutaneous every 12 hours  insulin lispro (HumaLOG) corrective regimen sliding scale   SubCutaneous three times a day before meals  insulin lispro (HumaLOG) corrective regimen sliding scale   SubCutaneous at bedtime  isosorbide   mononitrate ER Tablet (IMDUR) 30 milliGRAM(s) Oral daily  metoprolol tartrate 50 milliGRAM(s) Oral two times a day  NIFEdipine XL 60 milliGRAM(s) Oral daily  ondansetron Injectable 4 milliGRAM(s) IV Push every 6 hours PRN  senna 2 Tablet(s) Oral at bedtime  sodium chloride 0.9%. 1000 milliLiter(s) IV Continuous <Continuous>        FAMILY HISTORY:  No pertinent family history in first degree relatives        Review of Systems:    General: weak  Eyes:  Good vision, no reported pain  ENT:  No sore throat, pain, runny nose, dysphagia  CV:  No pain, palpitations, no lightheadedness  Resp:  No dyspnea, cough, tachypnea, wheezing  GI: see above  :  No pain, bleeding, incontinence, nocturia  Muscle:  No pain, weakness  Neuro:  No weakness, tingling, memory problems  Psych:  No fatigue, insomnia, mood problems, depression  Endocrine:  No polyuria, polydypsia, cold/heat intolerance  Heme:  No petechiae, ecchymosis, easy bruisability  Skin:  No rash, tattoos, scars, edema    Relevant Family History:   n/c    Relevant Social History: n/c      Physical Exam:    Vital Signs:  Vital Signs Last 24 Hrs  T(C): 36.6 (27 Nov 2018 05:22), Max: 36.9 (26 Nov 2018 15:57)  T(F): 97.8 (27 Nov 2018 05:22), Max: 98.4 (26 Nov 2018 15:57)  HR: 73 (27 Nov 2018 05:22) (73 - 78)  BP: 157/74 (27 Nov 2018 05:22) (145/70 - 177/87)  BP(mean): --  RR: 18 (27 Nov 2018 05:22) (18 - 18)  SpO2: 92% (27 Nov 2018 05:22) (91% - 96%)  Daily     Daily     General:  sitting up in bed non toxic appearing  HEENT:  NC/AT  Chest: dec bs  Cardiovascular:  Regular rhythm, S1, S2  Abdomen: obese abd soft ttp b/l lq no guarding no rt no upper quadrant ttp mild dt+ ecchymosis  Extremities:  no edema  Skin:  scattered ecchymosis  Neuro/Psych:  Awake alert responds appropriately    Laboratory:                            8.3    8.28  )-----------( 357      ( 27 Nov 2018 08:55 )             28.0     11-27    137  |  99  |  28<H>  ----------------------------<  106<H>  3.7   |  29  |  7.10<H>    Ca    8.5      27 Nov 2018 08:55    TPro  7.4  /  Alb  1.8<L>  /  TBili  0.5  /  DBili  x   /  AST  26  /  ALT  12  /  AlkPhos  234<H>  11-27    LIVER FUNCTIONS - ( 27 Nov 2018 08:55 )  Alb: 1.8 g/dL / Pro: 7.4 g/dL / ALK PHOS: 234 U/L / ALT: 12 U/L / AST: 26 U/L / GGT: x               Amylase Serum--      Lipase [id number]       Ammonia--    Imaging:      < from: CT Abdomen and Pelvis w/ IV Cont (11.26.18 @ 18:54) >    EXAM:  CT ABDOMEN AND PELVIS IC                            PROCEDURE DATE:  11/26/2018          INTERPRETATION:  .    CLINICAL INFORMATION: Abdominal pain. Pain across pelvis. Evaluate for   colitis or ischemia. Elevated lipase.    TECHNIQUE: Helical axial images were obtained from the domes of the   diaphragm through the pubic symphysis. 100 mls of Omnipaque-350  was   administered intravenously without complication and 0 mls were discarded.   Oral contrast was not administered. Coronal and Sagittal reconstructions   were obtained from the source data.     COMPARISON: Prior CT examination of the abdomen and pelvis from   11/12/2018. Prior MRI examination of the abdomen from 10/29/2018.    FINDINGS: The tip of a right-sided dialysis catheter is visualized within   the right atrium. The heart size is at the upper limits of normal. There   are atherosclerotic calcifications of the imaged coronary arteries,   aorta, and branch vessels. The imaged portions of the aorta are normal in   caliber.    Scattered areas of streaky linear atelectasis are notable within the   bilateral lung bases.    There are new rounded low-attenuation lesions scattered throughout the   liver, the largest of which measures up to 2 cm (series 4, image 28).   Another satellite lesion is notable for medial and superiorly. Another   vague lesion is notable within the right posterior hepatic lobe. These   lesions are not readily seen on the prior noncontrast examination. These   lesions were also not seen on theprior MRI examination of the abdomen.    There is redemonstration of cholelithiasis with gallbladder wall   thickening and adjacent fat stranding within the gallbladder fossa. Foci   of gas are notable inside the gallbladder lumen. The previously noted   cholecystostomy tube has been removed.    There is been interval placement of a common bile duct stent. No visible   radiopaque stones are noted adjacent to the stent. There is new   pneumobilia, compatible with stent patency.    The pancreas, spleen, and adrenal glands appear unremarkable.    A small exophytic left-sided renal cyst is notable. Arterial vascular   calcifications are notable in the region of the bilateral renal hilar   areas. There is bilateral renal cortical atrophy. There isno   hydroureteronephrosis. There is mild wall thickening of the urinary   bladder with adjacent fat stranding.    There are multiple scattered nonspecific subcentimeter retroperitoneal   and mesenteric lymph nodes.    There is a duodenal diverticulum. There is no bowel obstruction or   abdominal ascites. Gas and stool are notable throughout the large bowel   loops. The appendix is normal.    The patient is status post hysterectomy. No adnexal masses are   visualized. There is no pelvic free fluid. There is minimal presacral   edema.    Multilevel degenerative changes are noted within the imaged potions of   the spine. There is mild diffuse osteosclerosis which may be compatible   with early renal osteodystrophy. There is an unchanged compression   deformity involving the L2 vertebral body. There is no retropulsion.   There is also a mild superior end plate deformity of T12 which also   appears unchanged.    There is nonspecific mixed lucency and sclerosis on iliac sides of the   bilateral SI joints which appears unchanged.    IMPRESSION:    1. Interval development of rounded low-attenuation lesions scattered   throughout the liver suspicious for a developing abscesses. Recommend   further evaluation with a MRI examination with diffusion-weighted imaging.    2. Interval removal of the percutaneous cholecystostomy tube with   unchanged appearing cholecystitis.    3. Interval placement of a biliary stent with new pneumobilia compatible   with stent patency. The previously noted choledocholithiasis is no longer   or seen.    4. Other findings, as discussed.    Dr. Ed Bee discussed findings with Dr. Rosenthal on 11/26/2018 at 7:16 PM                ED BEE M.D., ATTENDING RADIOLOGIST  This document has been electronically signed. Nov 26 2018  7:17PM                < end of copied text >

## 2018-11-27 NOTE — CONSULT NOTE ADULT - PROBLEM SELECTOR RECOMMENDATION 4
elevated lipase  no scan evidence on ct  no upper quadrant abd ttp on exam  ?possibly r/t renal insufficiency   recheck lipase in am  monitor abd exam

## 2018-11-27 NOTE — PROGRESS NOTE ADULT - PROBLEM SELECTOR PLAN 7
-likely anemia or chronic disease and due to ESRD  -procrit as per nephrology  -H&H near baseline, no bleeding on ROS

## 2018-11-27 NOTE — CONSULT NOTE ADULT - ASSESSMENT
68F with multiple medical issues including CAD, DM2, ESRD/HD, ESBL E coli septicemia from cholecystitis/cholangitis now admitted for low abdominal pain incidentially found to have liver lesions concerning for abscesses. However hard to attribute her pain at presentation to liver lesions given very disparate locations. Would expect referred pain to shoulder, not bilateral lower abdomen. They are like too small and/or inaccessible to drainage attempts. WBC normal without left shift, and no fever. No new culture data. AKP elevated but overall trend down. With ?urinary retention wonder whether her abdominal discomfort at this point is due to this.    Likely relegated to treat with long term antibiotics vs. liver abscesses here.    Suggestions--  ?Urinary retention per primary team  Continue ertapenem for now       Discussed with Dr. Lambert    Discussed with family at bedside    Thank you for the courtesy of this referral.  Ed Solis MD  [phone]

## 2018-11-27 NOTE — CONSULT NOTE ADULT - PROBLEM SELECTOR RECOMMENDATION 3
likely multifactorial, acute illness, chronic dz, esrd  around baseline from previous admission  no overt s/s gib  trend h/h, transfuse prn  cont ppi  consider iron studies  hold anti plts in setting of active gib  will follow

## 2018-11-27 NOTE — CONSULT NOTE ADULT - ASSESSMENT
69 obese female with a history of HTN, HLD, DM, Obesity, ESRD on HD now admitted from dialysis unit with abdominal pain and found to have multiple hepatic lesions, most likely abscesses on the CT scan. Will continue the IV antibiotics as ordered. For dialysis in AM. will follow.

## 2018-11-27 NOTE — CONSULT NOTE ADULT - SUBJECTIVE AND OBJECTIVE BOX
Nephrology Consultation: MD CLAUDIA Wood, IRENE  68y    HPI:  70 y/o f with pmhx of htn, hld, obesity, HD dependant, DM 2 on basilglar, s/p cholecystotomy tube removal with biliary stent placement at [facility] p/w abdominal pain to ER. She developed abd pain while having HD and they had to stop that and send her to ED. Pt narrates that her pain is 4/10 in lower abdomen and non radiating. she was found ot have multiple liver lesion concening for liver abscesses and lipase of 489 concerning for acute pancreatitis, GI was called for evaluation. pt has no wbc elevation and no fever. Patient is now NPO and is being evaluated by GI.       PAST MEDICAL & SURGICAL HISTORY:  ESRD (end stage renal disease) on dialysis: MWF  CAD (coronary artery disease): s/p stent in 2007  Diabetes  Myocardial infarction  Hypertension  H/O: hysterectomy      Allergies    No Known Drug Allergies  Purell (Rash)    Intolerances        Home Medications:  calcium-vitamin D 500 mg-200 intl units oral tablet: 1 tab(s) orally once a day (01 Nov 2018 21:41)  epoetin analilia: 95452 unit(s) intravenous 3 times a day T/T/S  intra-dialysis  (23 Nov 2018 15:39)        FAMILY HISTORY:  No pertinent family history in first degree relatives      SOCIAL HISTORY:    REVIEW OF SYSTEMS:    Constitutional: No fever, weight loss or fatigue  Eyes: No eye pain, visual disturbances, or discharge  ENT:  No difficulty hearing, tinnitus, vertigo; No sinus or throat pain  Neck: No pain or stiffness  Breasts: No pain, masses or nipple discharge  Respiratory: No cough, wheezing, chills or hemoptysis  Cardiovascular: No chest pain, palpitations, shortness of breath, dizziness or leg swelling  Gastrointestinal: No abdominal or epigastric pain. No nausea, vomiting or hematemesis; No diarrhea or constipation. No melena or hematochezia.  Genitourinary: No dysuria, frequency, hematuria or incontinence  Rectal: No pain, hemorrhoids or incontinence  Neurological: No headaches, memory loss, loss of strength, numbness or tremors  Skin: No itching, burning, rashes or lesions   Lymph Nodes: No enlarged glands  Endocrine: No heat or cold intolerance; No hair loss  Musculoskeletal: No joint pain or swelling; No muscle, back or extremity pain  Psychiatric: No depression, anxiety, mood swings or difficulty sleeping  Heme/Lymph: No easy bruising or bleeding gums  Allergy and Immunologic: No hives or eczema    aspirin enteric coated 81 milliGRAM(s) Oral daily  atorvastatin 80 milliGRAM(s) Oral at bedtime  clopidogrel Tablet 75 milliGRAM(s) Oral daily  dextrose 40% Gel 15 Gram(s) Oral once PRN  dextrose 5%. 1000 milliLiter(s) IV Continuous <Continuous>  dextrose 50% Injectable 12.5 Gram(s) IV Push once  dextrose 50% Injectable 25 Gram(s) IV Push once  dextrose 50% Injectable 25 Gram(s) IV Push once  docusate sodium 100 milliGRAM(s) Oral daily  ertapenem  IVPB      gabapentin 300 milliGRAM(s) Oral two times a day  glucagon  Injectable 1 milliGRAM(s) IntraMuscular once PRN  heparin  Injectable 5000 Unit(s) SubCutaneous every 12 hours  insulin lispro (HumaLOG) corrective regimen sliding scale   SubCutaneous three times a day before meals  insulin lispro (HumaLOG) corrective regimen sliding scale   SubCutaneous at bedtime  isosorbide   mononitrate ER Tablet (IMDUR) 30 milliGRAM(s) Oral daily  lactobacillus acidophilus 1 Tablet(s) Oral three times a day with meals  metoprolol tartrate 50 milliGRAM(s) Oral two times a day  NIFEdipine XL 60 milliGRAM(s) Oral daily  ondansetron Injectable 4 milliGRAM(s) IV Push every 6 hours PRN  pantoprazole    Tablet 40 milliGRAM(s) Oral before breakfast  senna 2 Tablet(s) Oral at bedtime  simethicone 80 milliGRAM(s) Chew three times a day  sodium chloride 0.9%. 1000 milliLiter(s) IV Continuous <Continuous>      Vital Signs Last 24 Hrs  T(C): 36.4 (27 Nov 2018 13:17), Max: 36.9 (26 Nov 2018 15:57)  T(F): 97.5 (27 Nov 2018 13:17), Max: 98.4 (26 Nov 2018 15:57)  HR: 73 (27 Nov 2018 13:17) (73 - 78)  BP: 151/67 (27 Nov 2018 13:17) (145/70 - 177/87)  BP(mean): --  RR: 19 (27 Nov 2018 13:17) (18 - 19)  SpO2: 94% (27 Nov 2018 13:17) (91% - 96%)    PHYSICAL EXAM:    Constitutional: NAD, well-groomed, well-developed  HEENT: PERRLA, EOMI, Normal Hearing, MMM  Neck: No LAD, No JVD  Back: Normal spine flexure, No CVA tenderness  Respiratory: CTAB/L   Cardiovascular: S1 and S2, RRR, no M/G/R  Gastrointestinal: BS+, soft, NT/ND  Extremities: No peripheral edema  Vascular: 2+ peripheral pulses  Neurological: A/O x 3, no focal deficits  Skin: No rashes      LABS:                        8.3    8.28  )-----------( 357      ( 27 Nov 2018 08:55 )             28.0     11-27    137  |  99  |  28<H>  ----------------------------<  106<H>  3.7   |  29  |  7.10<H>    Ca    8.5      27 Nov 2018 08:55    TPro  7.4  /  Alb  1.8<L>  /  TBili  0.5  /  DBili  x   /  AST  26  /  ALT  12  /  AlkPhos  234<H>  11-27          MICROBIOLOGY:  RECENT CULTURES:        RADIOLOGY & ADDITIONAL STUDIES:    < from: CT Abdomen and Pelvis w/ IV Cont (11.26.18 @ 18:54) >  EXAM:  CT ABDOMEN AND PELVIS IC                            PROCEDURE DATE:  11/26/2018          INTERPRETATION:  .    CLINICAL INFORMATION: Abdominal pain. Pain across pelvis. Evaluate for   colitis or ischemia. Elevated lipase.    TECHNIQUE: Helical axial images were obtained from the domes of the   diaphragm through the pubic symphysis. 100 mls of Omnipaque-350  was   administered intravenously without complication and 0 mls were discarded.   Oral contrast was not administered. Coronal and Sagittal reconstructions   were obtained from the source data.     COMPARISON: Prior CT examination of the abdomen and pelvis from   11/12/2018. Prior MRI examination of the abdomen from 10/29/2018.    FINDINGS: The tip of a right-sided dialysis catheter is visualized within   the right atrium. The heart size is at the upper limits of normal. There   are atherosclerotic calcifications of the imaged coronary arteries,   aorta, and branch vessels. The imaged portions of the aorta are normal in   caliber.    Scattered areas of streaky linear atelectasis are notable within the   bilateral lung bases.    There are new rounded low-attenuation lesions scattered throughout the   liver, the largest of which measures up to 2 cm (series 4, image 28).   Another satellite lesion is notable for medial and superiorly. Another   vague lesion is notable within the right posterior hepatic lobe. These   lesions are not readily seen on the prior noncontrast examination. These   lesions were also not seen on theprior MRI examination of the abdomen.    There is redemonstration of cholelithiasis with gallbladder wall   thickening and adjacent fat stranding within the gallbladder fossa. Foci   of gas are notable inside the gallbladder lumen. The previously noted   cholecystostomy tube has been removed.    There is been interval placement of a common bile duct stent. No visible   radiopaque stones are noted adjacent to the stent. There is new   pneumobilia, compatible with stent patency.    The pancreas, spleen, and adrenal glands appear unremarkable.    A small exophytic left-sided renal cyst is notable. Arterial vascular   calcifications are notable in the region of the bilateral renal hilar   areas. There is bilateral renal cortical atrophy. There isno   hydroureteronephrosis. There is mild wall thickening of the urinary   bladder with adjacent fat stranding.    There are multiple scattered nonspecific subcentimeter retroperitoneal   and mesenteric lymph nodes.    There is a duodenal diverticulum. There is no bowel obstruction or   abdominal ascites. Gas and stool are notable throughout the large bowel   loops. The appendix is normal.    The patient is status post hysterectomy. No adnexal masses are   visualized. There is no pelvic free fluid. There is minimal presacral   edema.    Multilevel degenerative changes are noted within the imaged potions of   the spine. There is mild diffuse osteosclerosis which may be compatible   with early renal osteodystrophy. There is an unchanged compression   deformity involving the L2 vertebral body. There is no retropulsion.   There is also a mild superior end plate deformity of T12 which also   appears unchanged.    There is nonspecific mixed lucency and sclerosis on iliac sides of the   bilateral SI joints which appears unchanged.    IMPRESSION:    1. Interval development of rounded low-attenuation lesions scattered   throughout the liver suspicious for a developing abscesses. Recommend   further evaluation with a MRI examination with diffusion-weighted imaging.    2. Interval removal of the percutaneous cholecystostomy tube with   unchanged appearing cholecystitis.    3. Interval placement of a biliary stent with new pneumobilia compatible   with stent patency. The previously noted choledocholithiasis is no longer   or seen.    4. Other findings, as discussed.    Dr. Ed Bob discussed findings with Dr. Rosenthal on 11/26/2018 at 7:16 PM

## 2018-11-27 NOTE — PROGRESS NOTE ADULT - PROBLEM SELECTOR PLAN 4
-c/w HISS for now  -home lantus dose was not given and pt's fasting AM glucose was only 124 so will hold off on lantus for now  -FSG in the 100s  -consider starting some lantus tomorrow if FSG rising as pt is eating now

## 2018-11-28 DIAGNOSIS — R33.8 OTHER RETENTION OF URINE: ICD-10-CM

## 2018-11-28 DIAGNOSIS — E11.9 TYPE 2 DIABETES MELLITUS WITHOUT COMPLICATIONS: ICD-10-CM

## 2018-11-28 DIAGNOSIS — N18.6 END STAGE RENAL DISEASE: ICD-10-CM

## 2018-11-28 DIAGNOSIS — Z29.9 ENCOUNTER FOR PROPHYLACTIC MEASURES, UNSPECIFIED: ICD-10-CM

## 2018-11-28 DIAGNOSIS — I10 ESSENTIAL (PRIMARY) HYPERTENSION: ICD-10-CM

## 2018-11-28 DIAGNOSIS — I25.10 ATHEROSCLEROTIC HEART DISEASE OF NATIVE CORONARY ARTERY WITHOUT ANGINA PECTORIS: ICD-10-CM

## 2018-11-28 LAB
ALBUMIN SERPL ELPH-MCNC: 1.7 G/DL — LOW (ref 3.3–5)
ALP SERPL-CCNC: 219 U/L — HIGH (ref 40–120)
ALT FLD-CCNC: 13 U/L — SIGNIFICANT CHANGE UP (ref 12–78)
ANION GAP SERPL CALC-SCNC: 9 MMOL/L — SIGNIFICANT CHANGE UP (ref 5–17)
AST SERPL-CCNC: 23 U/L — SIGNIFICANT CHANGE UP (ref 15–37)
BASOPHILS # BLD AUTO: 0.02 K/UL — SIGNIFICANT CHANGE UP (ref 0–0.2)
BASOPHILS NFR BLD AUTO: 0.2 % — SIGNIFICANT CHANGE UP (ref 0–2)
BILIRUB DIRECT SERPL-MCNC: 0.3 MG/DL — HIGH (ref 0.05–0.2)
BILIRUB INDIRECT FLD-MCNC: 0.2 MG/DL — SIGNIFICANT CHANGE UP (ref 0.2–1)
BILIRUB SERPL-MCNC: 0.5 MG/DL — SIGNIFICANT CHANGE UP (ref 0.2–1.2)
BUN SERPL-MCNC: 32 MG/DL — HIGH (ref 7–23)
CALCIUM SERPL-MCNC: 8 MG/DL — LOW (ref 8.5–10.1)
CHLORIDE SERPL-SCNC: 102 MMOL/L — SIGNIFICANT CHANGE UP (ref 96–108)
CO2 SERPL-SCNC: 26 MMOL/L — SIGNIFICANT CHANGE UP (ref 22–31)
CREAT SERPL-MCNC: 8 MG/DL — HIGH (ref 0.5–1.3)
EOSINOPHIL # BLD AUTO: 0.5 K/UL — SIGNIFICANT CHANGE UP (ref 0–0.5)
EOSINOPHIL NFR BLD AUTO: 5.1 % — SIGNIFICANT CHANGE UP (ref 0–6)
GLUCOSE SERPL-MCNC: 126 MG/DL — HIGH (ref 70–99)
HCT VFR BLD CALC: 27.2 % — LOW (ref 34.5–45)
HGB BLD-MCNC: 8.3 G/DL — LOW (ref 11.5–15.5)
IMM GRANULOCYTES NFR BLD AUTO: 0.4 % — SIGNIFICANT CHANGE UP (ref 0–1.5)
INR BLD: 1.22 RATIO — HIGH (ref 0.88–1.16)
LIDOCAIN IGE QN: 331 U/L — SIGNIFICANT CHANGE UP (ref 73–393)
LYMPHOCYTES # BLD AUTO: 2.85 K/UL — SIGNIFICANT CHANGE UP (ref 1–3.3)
LYMPHOCYTES # BLD AUTO: 29.4 % — SIGNIFICANT CHANGE UP (ref 13–44)
MCHC RBC-ENTMCNC: 27.1 PG — SIGNIFICANT CHANGE UP (ref 27–34)
MCHC RBC-ENTMCNC: 30.5 GM/DL — LOW (ref 32–36)
MCV RBC AUTO: 88.9 FL — SIGNIFICANT CHANGE UP (ref 80–100)
MONOCYTES # BLD AUTO: 0.71 K/UL — SIGNIFICANT CHANGE UP (ref 0–0.9)
MONOCYTES NFR BLD AUTO: 7.3 % — SIGNIFICANT CHANGE UP (ref 2–14)
NEUTROPHILS # BLD AUTO: 5.59 K/UL — SIGNIFICANT CHANGE UP (ref 1.8–7.4)
NEUTROPHILS NFR BLD AUTO: 57.6 % — SIGNIFICANT CHANGE UP (ref 43–77)
PLATELET # BLD AUTO: 339 K/UL — SIGNIFICANT CHANGE UP (ref 150–400)
POTASSIUM SERPL-MCNC: 3.9 MMOL/L — SIGNIFICANT CHANGE UP (ref 3.5–5.3)
POTASSIUM SERPL-SCNC: 3.9 MMOL/L — SIGNIFICANT CHANGE UP (ref 3.5–5.3)
PROT SERPL-MCNC: 7 G/DL — SIGNIFICANT CHANGE UP (ref 6–8.3)
PROTHROM AB SERPL-ACNC: 14 SEC — HIGH (ref 10–12.9)
RBC # BLD: 3.06 M/UL — LOW (ref 3.8–5.2)
RBC # FLD: 16.6 % — HIGH (ref 10.3–14.5)
SODIUM SERPL-SCNC: 137 MMOL/L — SIGNIFICANT CHANGE UP (ref 135–145)
WBC # BLD: 9.71 K/UL — SIGNIFICANT CHANGE UP (ref 3.8–10.5)
WBC # FLD AUTO: 9.71 K/UL — SIGNIFICANT CHANGE UP (ref 3.8–10.5)

## 2018-11-28 PROCEDURE — 99233 SBSQ HOSP IP/OBS HIGH 50: CPT

## 2018-11-28 RX ORDER — DOCUSATE SODIUM 100 MG
100 CAPSULE ORAL
Qty: 0 | Refills: 0 | Status: DISCONTINUED | OUTPATIENT
Start: 2018-11-28 | End: 2018-12-03

## 2018-11-28 RX ORDER — TAMSULOSIN HYDROCHLORIDE 0.4 MG/1
0.4 CAPSULE ORAL AT BEDTIME
Qty: 0 | Refills: 0 | Status: DISCONTINUED | OUTPATIENT
Start: 2018-11-28 | End: 2018-12-07

## 2018-11-28 RX ORDER — ERYTHROPOIETIN 10000 [IU]/ML
10000 INJECTION, SOLUTION INTRAVENOUS; SUBCUTANEOUS
Qty: 0 | Refills: 0 | Status: DISCONTINUED | OUTPATIENT
Start: 2018-11-28 | End: 2018-12-07

## 2018-11-28 RX ORDER — HYDROCORTISONE 1 %
1 OINTMENT (GRAM) TOPICAL ONCE
Qty: 0 | Refills: 0 | Status: COMPLETED | OUTPATIENT
Start: 2018-11-28 | End: 2018-11-28

## 2018-11-28 RX ORDER — ERTAPENEM SODIUM 1 G/1
500 INJECTION, POWDER, LYOPHILIZED, FOR SOLUTION INTRAMUSCULAR; INTRAVENOUS EVERY 24 HOURS
Qty: 0 | Refills: 0 | Status: DISCONTINUED | OUTPATIENT
Start: 2018-11-29 | End: 2018-12-02

## 2018-11-28 RX ORDER — SIMETHICONE 80 MG/1
80 TABLET, CHEWABLE ORAL THREE TIMES A DAY
Qty: 0 | Refills: 0 | Status: DISCONTINUED | OUTPATIENT
Start: 2018-11-28 | End: 2018-12-07

## 2018-11-28 RX ORDER — GLYCERIN ADULT
1 SUPPOSITORY, RECTAL RECTAL DAILY
Qty: 0 | Refills: 0 | Status: DISCONTINUED | OUTPATIENT
Start: 2018-11-28 | End: 2018-12-02

## 2018-11-28 RX ADMIN — CLOPIDOGREL BISULFATE 75 MILLIGRAM(S): 75 TABLET, FILM COATED ORAL at 14:54

## 2018-11-28 RX ADMIN — ATORVASTATIN CALCIUM 80 MILLIGRAM(S): 80 TABLET, FILM COATED ORAL at 21:15

## 2018-11-28 RX ADMIN — Medication 1: at 08:09

## 2018-11-28 RX ADMIN — Medication 50 MILLIGRAM(S): at 17:30

## 2018-11-28 RX ADMIN — PANTOPRAZOLE SODIUM 40 MILLIGRAM(S): 20 TABLET, DELAYED RELEASE ORAL at 05:38

## 2018-11-28 RX ADMIN — Medication 2: at 17:29

## 2018-11-28 RX ADMIN — Medication 50 MILLIGRAM(S): at 05:39

## 2018-11-28 RX ADMIN — TAMSULOSIN HYDROCHLORIDE 0.4 MILLIGRAM(S): 0.4 CAPSULE ORAL at 21:15

## 2018-11-28 RX ADMIN — Medication 100 MILLIGRAM(S): at 17:30

## 2018-11-28 RX ADMIN — ERTAPENEM SODIUM 100 MILLIGRAM(S): 1 INJECTION, POWDER, LYOPHILIZED, FOR SOLUTION INTRAMUSCULAR; INTRAVENOUS at 14:55

## 2018-11-28 RX ADMIN — CHLORHEXIDINE GLUCONATE 1 APPLICATION(S): 213 SOLUTION TOPICAL at 15:02

## 2018-11-28 RX ADMIN — ISOSORBIDE MONONITRATE 30 MILLIGRAM(S): 60 TABLET, EXTENDED RELEASE ORAL at 14:52

## 2018-11-28 RX ADMIN — HEPARIN SODIUM 5000 UNIT(S): 5000 INJECTION INTRAVENOUS; SUBCUTANEOUS at 05:38

## 2018-11-28 RX ADMIN — GABAPENTIN 300 MILLIGRAM(S): 400 CAPSULE ORAL at 05:39

## 2018-11-28 RX ADMIN — Medication 1 APPLICATION(S): at 17:36

## 2018-11-28 RX ADMIN — Medication 1 TABLET(S): at 14:57

## 2018-11-28 RX ADMIN — SIMETHICONE 80 MILLIGRAM(S): 80 TABLET, CHEWABLE ORAL at 14:57

## 2018-11-28 RX ADMIN — Medication 81 MILLIGRAM(S): at 14:54

## 2018-11-28 RX ADMIN — GABAPENTIN 300 MILLIGRAM(S): 400 CAPSULE ORAL at 17:30

## 2018-11-28 RX ADMIN — Medication 60 MILLIGRAM(S): at 05:39

## 2018-11-28 RX ADMIN — Medication 1 TABLET(S): at 08:11

## 2018-11-28 RX ADMIN — HEPARIN SODIUM 5000 UNIT(S): 5000 INJECTION INTRAVENOUS; SUBCUTANEOUS at 17:31

## 2018-11-28 RX ADMIN — SENNA PLUS 2 TABLET(S): 8.6 TABLET ORAL at 21:15

## 2018-11-28 RX ADMIN — Medication 100 MILLIGRAM(S): at 14:54

## 2018-11-28 RX ADMIN — Medication 1 TABLET(S): at 17:29

## 2018-11-28 RX ADMIN — ERYTHROPOIETIN 10000 UNIT(S): 10000 INJECTION, SOLUTION INTRAVENOUS; SUBCUTANEOUS at 12:41

## 2018-11-28 RX ADMIN — SIMETHICONE 80 MILLIGRAM(S): 80 TABLET, CHEWABLE ORAL at 05:39

## 2018-11-28 NOTE — PROGRESS NOTE ADULT - PROBLEM SELECTOR PLAN 6
-c/w imdur, metoprolol, nifedipine -c/w lipitor, ASA, plavix  -unclear why on DAPT if stent was in 2007 -- try to get collateral

## 2018-11-28 NOTE — PROGRESS NOTE ADULT - PROBLEM SELECTOR PLAN 8
VTE ppx: HSQ -likely anemia of chronic disease and due to ESRD  -procrit as per nephrology  -H&H near baseline, no bleeding on ROS  -check iron and TIBC, ferritin, folate, B12

## 2018-11-28 NOTE — PROGRESS NOTE ADULT - PROBLEM SELECTOR PLAN 7
-likely anemia of chronic disease and due to ESRD  -procrit as per nephrology  -H&H near baseline, no bleeding on ROS  -check iron and TIBC, ferritin, folate, B12 -c/w imdur, metoprolol, nifedipine

## 2018-11-28 NOTE — PROGRESS NOTE ADULT - PROBLEM SELECTOR PLAN 4
-c/w HISS for now  -home lantus dose has not been given and pt's fasting AM glucose was only 153 today so will hold off on lantus for now  -FSG in the 100s for the most part  -consider starting some lantus tomorrow if FSG rising as pt is eating now -c/w HD M/W/F  -renal recs appreciated

## 2018-11-28 NOTE — PROGRESS NOTE ADULT - PROBLEM SELECTOR PLAN 3
am labs pending  likely multifactorial, acute illness, chronic dz, esrd  around baseline from previous admission  no overt s/s gib  trend h/h, transfuse prn  cont ppi  consider iron studies  hold anti plts in setting of active gib  will follow

## 2018-11-28 NOTE — CONSULT NOTE ADULT - SUBJECTIVE AND OBJECTIVE BOX
CHIEF COMPLAINT:  68y Female with chief complaint of lower abdomen pain and urinary retention  hx esrd on dialysis     HISTORY OF PRESENT ILLNESS:    this si a 70 y/o f with pmhx of htn, hld, obesity, HD dependant, DM 2 on basilglar, s/p cholecystotomy tube removal with biliary stent placement at [facility] p/w abdominal pain to ER. She developed abd pain while having HD and they had to stop that and send her to ED. Pt narrates that her pain is 4/10 in lower abdomen and non radiating. she was found ot have multiple liver lesion concening for liver abscesses and lipas eof 489 concerning for acute pancreatitis, GI was called for evaluation. pt ha sno wbc elevationan dno fever  acc by daughter in law    *************************************************************************************************  PAST MEDICAL & SURGICAL HISTORY:  ESRD (end stage renal disease) on dialysis: MWF  CAD (coronary artery disease): s/p stent in 2007  Diabetes  Myocardial infarction  Hypertension  H/O: hysterectomy      MEDICATIONS  (STANDING):  aspirin enteric coated 81 milliGRAM(s) Oral daily  atorvastatin 80 milliGRAM(s) Oral at bedtime  chlorhexidine 2% Cloths 1 Application(s) Topical daily  clopidogrel Tablet 75 milliGRAM(s) Oral daily  dextrose 5%. 1000 milliLiter(s) (50 mL/Hr) IV Continuous <Continuous>  dextrose 50% Injectable 12.5 Gram(s) IV Push once  dextrose 50% Injectable 25 Gram(s) IV Push once  dextrose 50% Injectable 25 Gram(s) IV Push once  docusate sodium 100 milliGRAM(s) Oral daily  epoetin analilia Injectable 49344 Unit(s) IV Push <User Schedule>  ertapenem  IVPB      ertapenem  IVPB 500 milliGRAM(s) IV Intermittent every 24 hours  gabapentin 300 milliGRAM(s) Oral two times a day  glycerin Suppository - Adult 1 Suppository(s) Rectal daily  heparin  Injectable 5000 Unit(s) SubCutaneous every 12 hours  insulin lispro (HumaLOG) corrective regimen sliding scale   SubCutaneous three times a day before meals  insulin lispro (HumaLOG) corrective regimen sliding scale   SubCutaneous at bedtime  isosorbide   mononitrate ER Tablet (IMDUR) 30 milliGRAM(s) Oral daily  lactobacillus acidophilus 1 Tablet(s) Oral three times a day with meals  metoprolol tartrate 50 milliGRAM(s) Oral two times a day  NIFEdipine XL 60 milliGRAM(s) Oral daily  pantoprazole    Tablet 40 milliGRAM(s) Oral before breakfast  senna 2 Tablet(s) Oral at bedtime  simethicone 80 milliGRAM(s) Chew three times a day    MEDICATIONS  (PRN):  dextrose 40% Gel 15 Gram(s) Oral once PRN Blood Glucose LESS THAN 70 milliGRAM(s)/deciliter  glucagon  Injectable 1 milliGRAM(s) IntraMuscular once PRN Glucose LESS THAN 70 milligrams/deciliter  ondansetron Injectable 4 milliGRAM(s) IV Push every 6 hours PRN Nausea      ALLERGIES:  No Known Drug Allergies  Purell (Rash)      SOCIAL HISTORY:          ETOH:                                  Smoking:                                   Drugs:                                         Occupation:    FAMILY HISTORY:  No pertinent family history in first degree relatives      CONSTITUTIONAL: No weakness, fevers or chills    EYES/ENT: No visual changes;  No vertigo or throat pain     NECK: No pain or stiffness    RESPIRATORY: No cough, wheezing, hemoptysis; No shortness of breath    CARDIOVASCULAR: No chest pain or palpitations    GASTROINTESTINAL: No abdominal or epigastric pain. No nausea, vomiting, or hematemesis; No diarrhea or constipation. No melena or hematochezia.    GENITOURINARY:     NEUROLOGICAL: No numbness or weakness    SKIN: No itching, burning, rashes, or lesions     All other review of systems is negative unless indicated above.    ****************************************************************************************************  PHYSICAL EXAM:    Vital Signs Last 24 Hrs  T(C): 37.2 (28 Nov 2018 05:22), Max: 37.2 (27 Nov 2018 21:13)  T(F): 99 (28 Nov 2018 05:22), Max: 99 (28 Nov 2018 05:22)  HR: 75 (28 Nov 2018 05:22) (73 - 79)  BP: 148/72 (28 Nov 2018 05:22) (148/72 - 160/76)  BP(mean): --  RR: 18 (28 Nov 2018 05:22) (18 - 19)  SpO2: 95% (28 Nov 2018 05:22) (94% - 95%)    GENERAL:    ABDOMEN:    BACK:    LOWER EXTREMITIES:    NEUROLOGICAL:      *******************************************************************************************************  LABS:                        8.3    9.71  )-----------( 339      ( 28 Nov 2018 08:54 )             27.2     11-28    137  |  102  |  32<H>  ----------------------------<  126<H>  3.9   |  26  |  8.00<H>    Ca    8.0<L>      28 Nov 2018 08:54    TPro  7.0  /  Alb  1.7<L>  /  TBili  0.5  /  DBili  .30<H>  /  AST  23  /  ALT  13  /  AlkPhos  219<H>  11-28    PT/INR - ( 28 Nov 2018 08:54 )   PT: 14.0 sec;   INR: 1.22 ratio             Urine Culture: 11-27 @ 00:16  Urine Culure Results   No growth to date.  Organism --      RADIOLOGY & ADDITIONAL STUDIES:    EXAM:  CT ABDOMEN AND PELVIS IC                            PROCEDURE DATE:  11/26/2018          INTERPRETATION:  .    CLINICAL INFORMATION: Abdominal pain. Pain across pelvis. Evaluate for   colitis or ischemia. Elevated lipase.    TECHNIQUE: Helical axial images were obtained from the domes of the   diaphragm through the pubic symphysis. 100 mls of Omnipaque-350  was   administered intravenously without complication and 0 mls were discarded.   Oral contrast was not administered. Coronal and Sagittal reconstructions   were obtained from the source data.     COMPARISON: Prior CT examination of the abdomen and pelvis from   11/12/2018. Prior MRI examination of the abdomen from 10/29/2018.    FINDINGS: The tip of a right-sided dialysis catheter is visualized within   the right atrium. The heart size is at the upper limits of normal. There   are atherosclerotic calcifications of the imaged coronary arteries,   aorta, and branch vessels. The imaged portions of the aorta are normal in   caliber.    Scattered areas of streaky linear atelectasis are notable within the   bilateral lung bases.    There are new rounded low-attenuation lesions scattered throughout the   liver, the largest of which measures up to 2 cm (series 4, image 28).   Another satellite lesion is notable for medial and superiorly. Another   vague lesion is notable within the right posterior hepatic lobe. These   lesions are not readily seen on the prior noncontrast examination. These   lesions were also not seen on theprior MRI examination of the abdomen.    There is redemonstration of cholelithiasis with gallbladder wall   thickening and adjacent fat stranding within the gallbladder fossa. Foci   of gas are notable inside the gallbladder lumen. The previously noted   cholecystostomy tube has been removed.    There is been interval placement of a common bile duct stent. No visible   radiopaque stones are noted adjacent to the stent. There is new   pneumobilia, compatible with stent patency.    The pancreas, spleen, and adrenal glands appear unremarkable.    A small exophytic left-sided renal cyst is notable. Arterial vascular   calcifications are notable in the region of the bilateral renal hilar   areas. There is bilateral renal cortical atrophy. There isno   hydroureteronephrosis. There is mild wall thickening of the urinary   bladder with adjacent fat stranding.    There are multiple scattered nonspecific subcentimeter retroperitoneal   and mesenteric lymph nodes.    There is a duodenal diverticulum. There is no bowel obstruction or   abdominal ascites. Gas and stool are notable throughout the large bowel   loops. The appendix is normal.    The patient is status post hysterectomy. No adnexal masses are   visualized. There is no pelvic free fluid. There is minimal presacral   edema.    Multilevel degenerative changes are noted within the imaged potions of   the spine. There is mild diffuse osteosclerosis which may be compatible   with early renal osteodystrophy. There is an unchanged compression   deformity involving the L2 vertebral body. There is no retropulsion.   There is also a mild superior end plate deformity of T12 which also   appears unchanged.    There is nonspecific mixed lucency and sclerosis on iliac sides of the   bilateral SI joints which appears unchanged.    IMPRESSION:    1. Interval development of rounded low-attenuation lesions scattered   throughout the liver suspicious for a developing abscesses. Recommend   further evaluation with a MRI examination with diffusion-weighted imaging.    2. Interval removal of the percutaneous cholecystostomy tube with   unchanged appearing cholecystitis.    3. Interval placement of a biliary stent with new pneumobilia compatible   with stent patency. The previously noted choledocholithiasis is no longer   or seen.    4. Other findings, as discussed.    Dr. Ed Bee discussed findings with Dr. Rosenthal on 11/26/2018 at 7:16 PM                ED BEE M.D., ATTENDING RADIOLOGIST  This document has been electronically signed. Nov 26 2018  7:17PM                *********************************************************************************************************  IMPRESSION:    PLAN: CHIEF COMPLAINT:  68y Female with chief complaint of lower abdomen pain and urinary retention  hx esrd on dialysis     HISTORY OF PRESENT ILLNESS:    this si a 68 y/o f with pmhx of htn, hld, obesity, HD dependant, DM 2 on basilglar, s/p cholecystotomy tube removal with biliary stent placement at [facility] p/w abdominal pain to ER. She developed abd pain while having HD and they had to stop that and send her to ED. Pt narrates that her pain is 4/10 in lower abdomen and non radiating. she was found ot have multiple liver lesion concening for liver abscesses and lipas eof 489 concerning for acute pancreatitis, GI was called for evaluation. pt ha sno wbc elevationan dno fever  acc by daughter in law    *************************************************************************************************  PAST MEDICAL & SURGICAL HISTORY:  ESRD (end stage renal disease) on dialysis: MWF  CAD (coronary artery disease): s/p stent in 2007  Diabetes  Myocardial infarction  Hypertension  H/O: hysterectomy      MEDICATIONS  (STANDING):  aspirin enteric coated 81 milliGRAM(s) Oral daily  atorvastatin 80 milliGRAM(s) Oral at bedtime  chlorhexidine 2% Cloths 1 Application(s) Topical daily  clopidogrel Tablet 75 milliGRAM(s) Oral daily  dextrose 5%. 1000 milliLiter(s) (50 mL/Hr) IV Continuous <Continuous>  dextrose 50% Injectable 12.5 Gram(s) IV Push once  dextrose 50% Injectable 25 Gram(s) IV Push once  dextrose 50% Injectable 25 Gram(s) IV Push once  docusate sodium 100 milliGRAM(s) Oral daily  epoetin analilia Injectable 24179 Unit(s) IV Push <User Schedule>  ertapenem  IVPB      ertapenem  IVPB 500 milliGRAM(s) IV Intermittent every 24 hours  gabapentin 300 milliGRAM(s) Oral two times a day  glycerin Suppository - Adult 1 Suppository(s) Rectal daily  heparin  Injectable 5000 Unit(s) SubCutaneous every 12 hours  insulin lispro (HumaLOG) corrective regimen sliding scale   SubCutaneous three times a day before meals  insulin lispro (HumaLOG) corrective regimen sliding scale   SubCutaneous at bedtime  isosorbide   mononitrate ER Tablet (IMDUR) 30 milliGRAM(s) Oral daily  lactobacillus acidophilus 1 Tablet(s) Oral three times a day with meals  metoprolol tartrate 50 milliGRAM(s) Oral two times a day  NIFEdipine XL 60 milliGRAM(s) Oral daily  pantoprazole    Tablet 40 milliGRAM(s) Oral before breakfast  senna 2 Tablet(s) Oral at bedtime  simethicone 80 milliGRAM(s) Chew three times a day    MEDICATIONS  (PRN):  dextrose 40% Gel 15 Gram(s) Oral once PRN Blood Glucose LESS THAN 70 milliGRAM(s)/deciliter  glucagon  Injectable 1 milliGRAM(s) IntraMuscular once PRN Glucose LESS THAN 70 milligrams/deciliter  ondansetron Injectable 4 milliGRAM(s) IV Push every 6 hours PRN Nausea      ALLERGIES:  No Known Drug Allergies  Purell (Rash)      SOCIAL HISTORY:          ETOH:                                  Smoking:                                   Drugs:                                         Occupation:    FAMILY HISTORY:  No pertinent family history in first degree relatives      CONSTITUTIONAL: No weakness, fevers or chills    EYES/ENT: No visual changes;  No vertigo or throat pain     NECK: No pain or stiffness    RESPIRATORY: No cough, wheezing, hemoptysis; No shortness of breath    CARDIOVASCULAR: No chest pain or palpitations    GASTROINTESTINAL: No abdominal or epigastric pain. No nausea, vomiting, or hematemesis; No diarrhea or constipation. No melena or hematochezia.    GENITOURINARY: unable to void today  normally goes 4-5 times a day     NEUROLOGICAL: No numbness or weakness    SKIN: No itching, burning, rashes, or lesions     All other review of systems is negative unless indicated above.    ****************************************************************************************************  PHYSICAL EXAM:    Vital Signs Last 24 Hrs  T(C): 37.2 (28 Nov 2018 05:22), Max: 37.2 (27 Nov 2018 21:13)  T(F): 99 (28 Nov 2018 05:22), Max: 99 (28 Nov 2018 05:22)  HR: 75 (28 Nov 2018 05:22) (73 - 79)  BP: 148/72 (28 Nov 2018 05:22) (148/72 - 160/76)  BP(mean): --  RR: 18 (28 Nov 2018 05:22) (18 - 19)  SpO2: 95% (28 Nov 2018 05:22) (94% - 95%)    GENERAL: alert   no english  on dialysis machine     ABDOMEN: soft   has aguilar     BACK:    LOWER EXTREMITIES:    NEUROLOGICAL:      *******************************************************************************************************  LABS:                        8.3    9.71  )-----------( 339      ( 28 Nov 2018 08:54 )             27.2     11-28    137  |  102  |  32<H>  ----------------------------<  126<H>  3.9   |  26  |  8.00<H>    Ca    8.0<L>      28 Nov 2018 08:54    TPro  7.0  /  Alb  1.7<L>  /  TBili  0.5  /  DBili  .30<H>  /  AST  23  /  ALT  13  /  AlkPhos  219<H>  11-28    PT/INR - ( 28 Nov 2018 08:54 )   PT: 14.0 sec;   INR: 1.22 ratio             Urine Culture: 11-27 @ 00:16  Urine Culure Results   No growth to date.  Organism --      RADIOLOGY & ADDITIONAL STUDIES:    EXAM:  CT ABDOMEN AND PELVIS IC                            PROCEDURE DATE:  11/26/2018          INTERPRETATION:  .    CLINICAL INFORMATION: Abdominal pain. Pain across pelvis. Evaluate for   colitis or ischemia. Elevated lipase.    TECHNIQUE: Helical axial images were obtained from the domes of the   diaphragm through the pubic symphysis. 100 mls of Omnipaque-350  was   administered intravenously without complication and 0 mls were discarded.   Oral contrast was not administered. Coronal and Sagittal reconstructions   were obtained from the source data.     COMPARISON: Prior CT examination of the abdomen and pelvis from   11/12/2018. Prior MRI examination of the abdomen from 10/29/2018.    FINDINGS: The tip of a right-sided dialysis catheter is visualized within   the right atrium. The heart size is at the upper limits of normal. There   are atherosclerotic calcifications of the imaged coronary arteries,   aorta, and branch vessels. The imaged portions of the aorta are normal in   caliber.    Scattered areas of streaky linear atelectasis are notable within the   bilateral lung bases.    There are new rounded low-attenuation lesions scattered throughout the   liver, the largest of which measures up to 2 cm (series 4, image 28).   Another satellite lesion is notable for medial and superiorly. Another   vague lesion is notable within the right posterior hepatic lobe. These   lesions are not readily seen on the prior noncontrast examination. These   lesions were also not seen on theprior MRI examination of the abdomen.    There is redemonstration of cholelithiasis with gallbladder wall   thickening and adjacent fat stranding within the gallbladder fossa. Foci   of gas are notable inside the gallbladder lumen. The previously noted   cholecystostomy tube has been removed.    There is been interval placement of a common bile duct stent. No visible   radiopaque stones are noted adjacent to the stent. There is new   pneumobilia, compatible with stent patency.    The pancreas, spleen, and adrenal glands appear unremarkable.    A small exophytic left-sided renal cyst is notable. Arterial vascular   calcifications are notable in the region of the bilateral renal hilar   areas. There is bilateral renal cortical atrophy. There isno   hydroureteronephrosis. There is mild wall thickening of the urinary   bladder with adjacent fat stranding.    There are multiple scattered nonspecific subcentimeter retroperitoneal   and mesenteric lymph nodes.    There is a duodenal diverticulum. There is no bowel obstruction or   abdominal ascites. Gas and stool are notable throughout the large bowel   loops. The appendix is normal.    The patient is status post hysterectomy. No adnexal masses are   visualized. There is no pelvic free fluid. There is minimal presacral   edema.    Multilevel degenerative changes are noted within the imaged potions of   the spine. There is mild diffuse osteosclerosis which may be compatible   with early renal osteodystrophy. There is an unchanged compression   deformity involving the L2 vertebral body. There is no retropulsion.   There is also a mild superior end plate deformity of T12 which also   appears unchanged.    There is nonspecific mixed lucency and sclerosis on iliac sides of the   bilateral SI joints which appears unchanged.    IMPRESSION:    1. Interval development of rounded low-attenuation lesions scattered   throughout the liver suspicious for a developing abscesses. Recommend   further evaluation with a MRI examination with diffusion-weighted imaging.    2. Interval removal of the percutaneous cholecystostomy tube with   unchanged appearing cholecystitis.    3. Interval placement of a biliary stent with new pneumobilia compatible   with stent patency. The previously noted choledocholithiasis is no longer   or seen.    4. Other findings, as discussed.    Dr. Ed Bee discussed findings with Dr. Rosenthal on 11/26/2018 at 7:16 PM                ED BEE M.D., ATTENDING RADIOLOGIST  This document has been electronically signed. Nov 26 2018  7:17PM                *********************************************************************************************************  IMPRESSION: urinary retention    PLAN: flomax 0.4mg   voiding trial in a few days

## 2018-11-28 NOTE — PROGRESS NOTE ADULT - SUBJECTIVE AND OBJECTIVE BOX
INTERVAL HPI/OVERNIGHT EVENTS:  pt seen and examined  states pain resolved, feeling better  per overnight rn no c/o abd pain, no vomiting, no s/s overt gib  afebrile overnight labs pending    MEDICATIONS  (STANDING):  aspirin enteric coated 81 milliGRAM(s) Oral daily  atorvastatin 80 milliGRAM(s) Oral at bedtime  chlorhexidine 2% Cloths 1 Application(s) Topical daily  clopidogrel Tablet 75 milliGRAM(s) Oral daily  dextrose 5%. 1000 milliLiter(s) (50 mL/Hr) IV Continuous <Continuous>  dextrose 50% Injectable 12.5 Gram(s) IV Push once  dextrose 50% Injectable 25 Gram(s) IV Push once  dextrose 50% Injectable 25 Gram(s) IV Push once  docusate sodium 100 milliGRAM(s) Oral daily  ertapenem  IVPB      ertapenem  IVPB 500 milliGRAM(s) IV Intermittent every 24 hours  gabapentin 300 milliGRAM(s) Oral two times a day  heparin  Injectable 5000 Unit(s) SubCutaneous every 12 hours  insulin lispro (HumaLOG) corrective regimen sliding scale   SubCutaneous three times a day before meals  insulin lispro (HumaLOG) corrective regimen sliding scale   SubCutaneous at bedtime  isosorbide   mononitrate ER Tablet (IMDUR) 30 milliGRAM(s) Oral daily  lactobacillus acidophilus 1 Tablet(s) Oral three times a day with meals  metoprolol tartrate 50 milliGRAM(s) Oral two times a day  NIFEdipine XL 60 milliGRAM(s) Oral daily  pantoprazole    Tablet 40 milliGRAM(s) Oral before breakfast  senna 2 Tablet(s) Oral at bedtime  simethicone 80 milliGRAM(s) Chew three times a day    MEDICATIONS  (PRN):  dextrose 40% Gel 15 Gram(s) Oral once PRN Blood Glucose LESS THAN 70 milliGRAM(s)/deciliter  glucagon  Injectable 1 milliGRAM(s) IntraMuscular once PRN Glucose LESS THAN 70 milligrams/deciliter  ondansetron Injectable 4 milliGRAM(s) IV Push every 6 hours PRN Nausea      Allergies    No Known Drug Allergies  Purell (Rash)    Intolerances        Review of Systems:    General:  No wt loss, fevers, chills, night sweats, fatigue   Eyes:  Good vision, no reported pain  ENT:  No sore throat, pain, runny nose, dysphagia  CV:  No pain, palpitations, hypo/hypertension  Resp:  No dyspnea, cough, tachypnea, wheezing  GI:  No pain, No nausea, No vomiting, No diarrhea, No constipation, No weight loss, No fever, No pruritis, No rectal bleeding, No melena, No dysphagia  :  No pain, bleeding, incontinence, nocturia  Muscle:  No pain, weakness  Neuro:  No weakness, tingling, memory problems  Psych:  No fatigue, insomnia, mood problems, depression  Endocrine:  No polyuria, polydypsia, cold/heat intolerance  Heme:  No petechiae, ecchymosis, easy bruisability  Skin:  No rash, tattoos, scars, edema      Vital Signs Last 24 Hrs  T(C): 37.2 (28 Nov 2018 05:22), Max: 37.2 (27 Nov 2018 21:13)  T(F): 99 (28 Nov 2018 05:22), Max: 99 (28 Nov 2018 05:22)  HR: 75 (28 Nov 2018 05:22) (73 - 79)  BP: 148/72 (28 Nov 2018 05:22) (148/72 - 160/76)  BP(mean): --  RR: 18 (28 Nov 2018 05:22) (18 - 19)  SpO2: 95% (28 Nov 2018 05:22) (94% - 95%)    PHYSICAL EXAM:    General:  sitting up in bed in nad  HEENT:  NC/AT  Chest: dec bs  Cardiovascular:  Regular rhythm, S1, S2  Abdomen: obese abd soft nt mild dt+ ecchymosis  Extremities:  no edema  Skin:  scattered ecchymosis  Neuro/Psych:  Awake alert responds appropriately    LABS:                        8.3    8.28  )-----------( 357      ( 27 Nov 2018 08:55 )             28.0     11-27    137  |  99  |  28<H>  ----------------------------<  106<H>  3.7   |  29  |  7.10<H>    Ca    8.5      27 Nov 2018 08:55    TPro  7.4  /  Alb  1.8<L>  /  TBili  0.5  /  DBili  x   /  AST  26  /  ALT  12  /  AlkPhos  234<H>  11-27          RADIOLOGY & ADDITIONAL TESTS:  < from: MR Abdomen No Cont (11.27.18 @ 15:06) >    EXAM:  MR ABDOMEN                            PROCEDURE DATE:  11/27/2018          INTERPRETATION:  Indication: New hepatic lesions on CT suspicious for   abscesses.    Abdominal MR multisequence  no IV contrast.    Lack of IV contrast limits evaluation. Moreover there is suboptimal image   quality secondary to motion artifact and artifact from in situ biliary   stent.   Gallbladder is decompressed with a large stone in the neck. Gallbladder   wall thickening and pericholecystic inflammatory change noted consistent   with acute cholecystitis. There is no biliary dilatation or obvious   common duct stone. There are two  T2 hyperintens lesions at the hepatic   dome corresponding to hypodense foci noted on CT, measuring 1.8 and 1.4   cm respectively. At least one of these lesions demonstrates restricted   diffusion.. Definitive characterization difficult without IV contrast.   However, as these represent new lesions compared to  10/29/2018 they   likely represent small abscesses. The unenhanced pancreas not remarkable.   No scan evidence of pancreatitis or pancreatic ductal dilatation.   Unenhanced spleen not remarkable. Bilateral renal cortical atrophy. Left   renal cortical cyst.  No ascites.    Impression:    Limited by lack of IV contrast and scan artifact as described.  Gallbladder appearance suggestive of acute, or acute on chronic calculus   cholecystitis. No biliary dilatation or obvious common duct stone.  T2 hyperintense lesions probable small abscesses.                QUIN POTTS M.D., ATTENDING RADIOLOGIST  This document has been electronically signed. Nov 27 2018  3:25PM                < end of copied text >

## 2018-11-28 NOTE — PROGRESS NOTE ADULT - SUBJECTIVE AND OBJECTIVE BOX
Patient is a 68y old  Female who presents with a chief complaint of abdominal pain (28 Nov 2018 11:54)      INTERVAL HPI/OVERNIGHT EVENTS: Pt states abd pain resolved after aguilar placement. Denies fever, chills, CP, SOB. Pt complains of faint, mildly pruritic rash on her back.     MEDICATIONS  (STANDING):  aspirin enteric coated 81 milliGRAM(s) Oral daily  atorvastatin 80 milliGRAM(s) Oral at bedtime  chlorhexidine 2% Cloths 1 Application(s) Topical daily  clopidogrel Tablet 75 milliGRAM(s) Oral daily  dextrose 5%. 1000 milliLiter(s) (50 mL/Hr) IV Continuous <Continuous>  dextrose 50% Injectable 12.5 Gram(s) IV Push once  dextrose 50% Injectable 25 Gram(s) IV Push once  dextrose 50% Injectable 25 Gram(s) IV Push once  docusate sodium 100 milliGRAM(s) Oral two times a day  epoetin analilia Injectable 31446 Unit(s) IV Push <User Schedule>  ertapenem  IVPB      ertapenem  IVPB 500 milliGRAM(s) IV Intermittent every 24 hours  gabapentin 300 milliGRAM(s) Oral two times a day  glycerin Suppository - Adult 1 Suppository(s) Rectal daily  heparin  Injectable 5000 Unit(s) SubCutaneous every 12 hours  insulin lispro (HumaLOG) corrective regimen sliding scale   SubCutaneous three times a day before meals  insulin lispro (HumaLOG) corrective regimen sliding scale   SubCutaneous at bedtime  isosorbide   mononitrate ER Tablet (IMDUR) 30 milliGRAM(s) Oral daily  lactobacillus acidophilus 1 Tablet(s) Oral three times a day with meals  metoprolol tartrate 50 milliGRAM(s) Oral two times a day  NIFEdipine XL 60 milliGRAM(s) Oral daily  pantoprazole    Tablet 40 milliGRAM(s) Oral before breakfast  senna 2 Tablet(s) Oral at bedtime  tamsulosin 0.4 milliGRAM(s) Oral at bedtime    MEDICATIONS  (PRN):  dextrose 40% Gel 15 Gram(s) Oral once PRN Blood Glucose LESS THAN 70 milliGRAM(s)/deciliter  glucagon  Injectable 1 milliGRAM(s) IntraMuscular once PRN Glucose LESS THAN 70 milligrams/deciliter  ondansetron Injectable 4 milliGRAM(s) IV Push every 6 hours PRN Nausea  simethicone 80 milliGRAM(s) Chew three times a day PRN Gas      Allergies    No Known Drug Allergies  Purell (Rash)    Intolerances        REVIEW OF SYSTEMS:  CONSTITUTIONAL: No fever or chills  HEENT:  No headache, no sore throat  RESPIRATORY: No cough, wheezing, or shortness of breath  CARDIOVASCULAR: No chest pain, palpitations, or leg swelling  GASTROINTESTINAL: abd pain resolved, No nausea, vomiting, or diarrhea  GENITOURINARY: aguilar catheter in place. No dysuria, frequency, or hematuria  NEUROLOGICAL: no focal weakness or dizziness  MUSCULOSKELETAL: no myalgias    SKIN: faint rash on back    Vital Signs Last 24 Hrs  T(C): 36.8 (28 Nov 2018 19:41), Max: 37.2 (28 Nov 2018 05:22)  T(F): 98.3 (28 Nov 2018 19:41), Max: 99 (28 Nov 2018 05:22)  HR: 74 (28 Nov 2018 19:41) (74 - 75)  BP: 151/78 (28 Nov 2018 19:41) (134/56 - 153/62)  BP(mean): --  RR: 18 (28 Nov 2018 19:41) (16 - 20)  SpO2: 95% (28 Nov 2018 19:41) (95% - 100%)    PHYSICAL EXAM:  GENERAL: NAD, obese  HEENT:  anicteric, moist mucous membranes  CHEST/LUNG:  CTA b/l, no rales, wheezes, or rhonchi ; R chest permacath  HEART:  RRR, S1, S2  ABDOMEN:  BS+, soft, nontender, nondistended  BACK: no CVA tenderness; faint, erythematous rash over the back  EXTREMITIES: no edema, cyanosis, or calf tenderness  NERVOUS SYSTEM: answering questions and following commands    LABS:                        8.3    9.71  )-----------( 339      ( 28 Nov 2018 08:54 )             27.2     CBC Full  -  ( 28 Nov 2018 08:54 )  WBC Count : 9.71 K/uL  Hemoglobin : 8.3 g/dL  Hematocrit : 27.2 %  Platelet Count - Automated : 339 K/uL  Mean Cell Volume : 88.9 fl  Mean Cell Hemoglobin : 27.1 pg  Mean Cell Hemoglobin Concentration : 30.5 gm/dL  Auto Neutrophil # : 5.59 K/uL  Auto Lymphocyte # : 2.85 K/uL  Auto Monocyte # : 0.71 K/uL  Auto Eosinophil # : 0.50 K/uL  Auto Basophil # : 0.02 K/uL  Auto Neutrophil % : 57.6 %  Auto Lymphocyte % : 29.4 %  Auto Monocyte % : 7.3 %  Auto Eosinophil % : 5.1 %  Auto Basophil % : 0.2 %    28 Nov 2018 08:54    137    |  102    |  32     ----------------------------<  126    3.9     |  26     |  8.00     Ca    8.0        28 Nov 2018 08:54    TPro  7.0    /  Alb  1.7    /  TBili  0.5    /  DBili  .30    /  AST  23     /  ALT  13     /  AlkPhos  219    28 Nov 2018 08:54    PT/INR - ( 28 Nov 2018 08:54 )   PT: 14.0 sec;   INR: 1.22 ratio             CAPILLARY BLOOD GLUCOSE      POCT Blood Glucose.: 206 mg/dL (28 Nov 2018 16:47)  POCT Blood Glucose.: 143 mg/dL (28 Nov 2018 12:02)  POCT Blood Glucose.: 153 mg/dL (28 Nov 2018 07:32)        Culture - Blood (collected 11-27-18 @ 00:16)  Source: .Blood Blood  Preliminary Report (11-28-18 @ 01:05):    No growth to date.    Culture - Blood (collected 11-27-18 @ 00:16)  Source: .Blood Blood  Preliminary Report (11-28-18 @ 01:05):    No growth to date.        RADIOLOGY & ADDITIONAL TESTS:    Personally reviewed.     Consultant(s) Notes Reviewed:  [x] YES  [ ] NO Patient is a 68y old  Female who presents with a chief complaint of abdominal pain (28 Nov 2018 11:54)    INTERVAL HPI/OVERNIGHT EVENTS: Pt states abd pain resolved after aguilar placement. Denies fever, chills, CP, SOB. Pt complains of faint, mildly pruritic rash on her back.     MEDICATIONS  (STANDING):  aspirin enteric coated 81 milliGRAM(s) Oral daily  atorvastatin 80 milliGRAM(s) Oral at bedtime  chlorhexidine 2% Cloths 1 Application(s) Topical daily  clopidogrel Tablet 75 milliGRAM(s) Oral daily  dextrose 5%. 1000 milliLiter(s) (50 mL/Hr) IV Continuous <Continuous>  dextrose 50% Injectable 12.5 Gram(s) IV Push once  dextrose 50% Injectable 25 Gram(s) IV Push once  dextrose 50% Injectable 25 Gram(s) IV Push once  docusate sodium 100 milliGRAM(s) Oral two times a day  epoetin analilia Injectable 87009 Unit(s) IV Push <User Schedule>  ertapenem  IVPB      ertapenem  IVPB 500 milliGRAM(s) IV Intermittent every 24 hours  gabapentin 300 milliGRAM(s) Oral two times a day  glycerin Suppository - Adult 1 Suppository(s) Rectal daily  heparin  Injectable 5000 Unit(s) SubCutaneous every 12 hours  insulin lispro (HumaLOG) corrective regimen sliding scale   SubCutaneous three times a day before meals  insulin lispro (HumaLOG) corrective regimen sliding scale   SubCutaneous at bedtime  isosorbide   mononitrate ER Tablet (IMDUR) 30 milliGRAM(s) Oral daily  lactobacillus acidophilus 1 Tablet(s) Oral three times a day with meals  metoprolol tartrate 50 milliGRAM(s) Oral two times a day  NIFEdipine XL 60 milliGRAM(s) Oral daily  pantoprazole    Tablet 40 milliGRAM(s) Oral before breakfast  senna 2 Tablet(s) Oral at bedtime  tamsulosin 0.4 milliGRAM(s) Oral at bedtime    MEDICATIONS  (PRN):  dextrose 40% Gel 15 Gram(s) Oral once PRN Blood Glucose LESS THAN 70 milliGRAM(s)/deciliter  glucagon  Injectable 1 milliGRAM(s) IntraMuscular once PRN Glucose LESS THAN 70 milligrams/deciliter  ondansetron Injectable 4 milliGRAM(s) IV Push every 6 hours PRN Nausea  simethicone 80 milliGRAM(s) Chew three times a day PRN Gas      Allergies    No Known Drug Allergies  Purell (Rash)    Intolerances        REVIEW OF SYSTEMS:  CONSTITUTIONAL: No fever or chills  HEENT:  No headache, no sore throat  RESPIRATORY: No cough, wheezing, or shortness of breath  CARDIOVASCULAR: No chest pain, palpitations, or leg swelling  GASTROINTESTINAL: abd pain resolved, No nausea, vomiting, or diarrhea  GENITOURINARY: aguilar catheter in place. No dysuria, frequency, or hematuria  NEUROLOGICAL: no focal weakness or dizziness  MUSCULOSKELETAL: no myalgias    SKIN: faint rash on back    Vital Signs Last 24 Hrs  T(C): 36.8 (28 Nov 2018 19:41), Max: 37.2 (28 Nov 2018 05:22)  T(F): 98.3 (28 Nov 2018 19:41), Max: 99 (28 Nov 2018 05:22)  HR: 74 (28 Nov 2018 19:41) (74 - 75)  BP: 151/78 (28 Nov 2018 19:41) (134/56 - 153/62)  BP(mean): --  RR: 18 (28 Nov 2018 19:41) (16 - 20)  SpO2: 95% (28 Nov 2018 19:41) (95% - 100%)    PHYSICAL EXAM:  GENERAL: NAD, obese  HEENT:  anicteric, moist mucous membranes  CHEST/LUNG:  CTA b/l, no rales, wheezes, or rhonchi ; R chest permacath  HEART:  RRR, S1, S2  ABDOMEN:  BS+, soft, nontender, nondistended  BACK: no CVA tenderness; faint, erythematous rash over the back  EXTREMITIES: no edema, cyanosis, or calf tenderness  NERVOUS SYSTEM: answering questions and following commands    LABS:                        8.3    9.71  )-----------( 339      ( 28 Nov 2018 08:54 )             27.2     CBC Full  -  ( 28 Nov 2018 08:54 )  WBC Count : 9.71 K/uL  Hemoglobin : 8.3 g/dL  Hematocrit : 27.2 %  Platelet Count - Automated : 339 K/uL  Mean Cell Volume : 88.9 fl  Mean Cell Hemoglobin : 27.1 pg  Mean Cell Hemoglobin Concentration : 30.5 gm/dL  Auto Neutrophil # : 5.59 K/uL  Auto Lymphocyte # : 2.85 K/uL  Auto Monocyte # : 0.71 K/uL  Auto Eosinophil # : 0.50 K/uL  Auto Basophil # : 0.02 K/uL  Auto Neutrophil % : 57.6 %  Auto Lymphocyte % : 29.4 %  Auto Monocyte % : 7.3 %  Auto Eosinophil % : 5.1 %  Auto Basophil % : 0.2 %    28 Nov 2018 08:54    137    |  102    |  32     ----------------------------<  126    3.9     |  26     |  8.00     Ca    8.0        28 Nov 2018 08:54    TPro  7.0    /  Alb  1.7    /  TBili  0.5    /  DBili  .30    /  AST  23     /  ALT  13     /  AlkPhos  219    28 Nov 2018 08:54    PT/INR - ( 28 Nov 2018 08:54 )   PT: 14.0 sec;   INR: 1.22 ratio             CAPILLARY BLOOD GLUCOSE      POCT Blood Glucose.: 206 mg/dL (28 Nov 2018 16:47)  POCT Blood Glucose.: 143 mg/dL (28 Nov 2018 12:02)  POCT Blood Glucose.: 153 mg/dL (28 Nov 2018 07:32)        Culture - Blood (collected 11-27-18 @ 00:16)  Source: .Blood Blood  Preliminary Report (11-28-18 @ 01:05):    No growth to date.    Culture - Blood (collected 11-27-18 @ 00:16)  Source: .Blood Blood  Preliminary Report (11-28-18 @ 01:05):    No growth to date.        RADIOLOGY & ADDITIONAL TESTS:    Personally reviewed.     Consultant(s) Notes Reviewed:  [x] YES  [ ] NO

## 2018-11-28 NOTE — PROGRESS NOTE ADULT - SUBJECTIVE AND OBJECTIVE BOX
Patient is a 68y old  Female who presents with a chief complaint of abdominal pain (28 Nov 2018 08:26)      Patient seen in follow up for ESRD on HD.     PAST MEDICAL HISTORY:  ESRD (end stage renal disease) on dialysis  CAD (coronary artery disease)  Diabetes  CRF (chronic renal failure)  Hypertension  Pneumonia  Myocardial infarction  Hypertension  Diabetes    MEDICATIONS  (STANDING):  aspirin enteric coated 81 milliGRAM(s) Oral daily  atorvastatin 80 milliGRAM(s) Oral at bedtime  chlorhexidine 2% Cloths 1 Application(s) Topical daily  clopidogrel Tablet 75 milliGRAM(s) Oral daily  dextrose 5%. 1000 milliLiter(s) (50 mL/Hr) IV Continuous <Continuous>  dextrose 50% Injectable 12.5 Gram(s) IV Push once  dextrose 50% Injectable 25 Gram(s) IV Push once  dextrose 50% Injectable 25 Gram(s) IV Push once  docusate sodium 100 milliGRAM(s) Oral daily  epoetin analilia Injectable 65879 Unit(s) IV Push <User Schedule>  ertapenem  IVPB      ertapenem  IVPB 500 milliGRAM(s) IV Intermittent every 24 hours  gabapentin 300 milliGRAM(s) Oral two times a day  glycerin Suppository - Adult 1 Suppository(s) Rectal daily  heparin  Injectable 5000 Unit(s) SubCutaneous every 12 hours  insulin lispro (HumaLOG) corrective regimen sliding scale   SubCutaneous three times a day before meals  insulin lispro (HumaLOG) corrective regimen sliding scale   SubCutaneous at bedtime  isosorbide   mononitrate ER Tablet (IMDUR) 30 milliGRAM(s) Oral daily  lactobacillus acidophilus 1 Tablet(s) Oral three times a day with meals  metoprolol tartrate 50 milliGRAM(s) Oral two times a day  NIFEdipine XL 60 milliGRAM(s) Oral daily  pantoprazole    Tablet 40 milliGRAM(s) Oral before breakfast  senna 2 Tablet(s) Oral at bedtime  simethicone 80 milliGRAM(s) Chew three times a day    MEDICATIONS  (PRN):  dextrose 40% Gel 15 Gram(s) Oral once PRN Blood Glucose LESS THAN 70 milliGRAM(s)/deciliter  glucagon  Injectable 1 milliGRAM(s) IntraMuscular once PRN Glucose LESS THAN 70 milligrams/deciliter  ondansetron Injectable 4 milliGRAM(s) IV Push every 6 hours PRN Nausea    T(C): 37.2 (11-28-18 @ 05:22), Max: 37.2 (11-27-18 @ 21:13)  HR: 75 (11-28-18 @ 05:22) (73 - 79)  BP: 148/72 (11-28-18 @ 05:22) (145/70 - 160/76)  RR: 18 (11-28-18 @ 05:22) (18 - 19)  SpO2: 95% (11-28-18 @ 05:22) (91% - 96%)  Wt(kg): --  I&O's Detail    27 Nov 2018 07:01  -  28 Nov 2018 07:00  --------------------------------------------------------  IN:    sodium chloride 0.9%: 750 mL  Total IN: 750 mL    OUT:    Indwelling Catheter - Urethral: 1010 mL  Total OUT: 1010 mL    Total NET: -260 mL          PHYSICAL EXAM:  General: NAD, Rt neck dialysis catheter  Respiratory: b/l air entry  Cardiovascular: S1 S2  Gastrointestinal: soft  Extremities:  edema                          8.3    9.71  )-----------( 339      ( 28 Nov 2018 08:54 )             27.2     11-28    137  |  102  |  32<H>  ----------------------------<  126<H>  3.9   |  26  |  8.00<H>    Ca    8.0<L>      28 Nov 2018 08:54    TPro  7.0  /  Alb  1.7<L>  /  TBili  0.5  /  DBili  .30<H>  /  AST  23  /  ALT  13  /  AlkPhos  219<H>  11-28        LIVER FUNCTIONS - ( 28 Nov 2018 08:54 )  Alb: 1.7 g/dL / Pro: 7.0 g/dL / ALK PHOS: 219 U/L / ALT: 13 U/L / AST: 23 U/L / GGT: x               Sodium, Serum: 137 (11-28 @ 08:54)  Sodium, Serum: 137 (11-27 @ 08:55)  Sodium, Serum: 133 (11-26 @ 16:45)    Creatinine, Serum: 8.00 (11-28 @ 08:54)  Creatinine, Serum: 7.10 (11-27 @ 08:55)  Creatinine, Serum: 6.30 (11-26 @ 16:45)    Potassium, Serum: 3.9 (11-28 @ 08:54)  Potassium, Serum: 3.7 (11-27 @ 08:55)  Potassium, Serum: 4.1 (11-26 @ 16:45)    Hemoglobin: 8.3 (11-28 @ 08:54)  Hemoglobin: 8.3 (11-27 @ 08:55)  Hemoglobin: 9.3 (11-26 @ 16:45)    < from: CT Abdomen and Pelvis w/ IV Cont (11.26.18 @ 18:54) >  EXAM:  CT ABDOMEN AND PELVIS IC                            PROCEDURE DATE:  11/26/2018          INTERPRETATION:  .    CLINICAL INFORMATION: Abdominal pain. Pain across pelvis. Evaluate for   colitis or ischemia. Elevated lipase.    TECHNIQUE: Helical axial images were obtained from the domes of the   diaphragm through the pubic symphysis. 100 mls of Omnipaque-350  was   administered intravenously without complication and 0 mls were discarded.   Oral contrast was not administered. Coronal and Sagittal reconstructions   were obtained from the source data.     COMPARISON: Prior CT examination of the abdomen and pelvis from   11/12/2018. Prior MRI examination of the abdomen from 10/29/2018.    FINDINGS: The tip of a right-sided dialysis catheter is visualized within   the right atrium. The heart size is at the upper limits of normal. There   are atherosclerotic calcifications of the imaged coronary arteries,   aorta, and branch vessels. The imaged portions of the aorta are normal in   caliber.    Scattered areas of streaky linear atelectasis are notable within the   bilateral lung bases.    There are new rounded low-attenuation lesions scattered throughout the   liver, the largest of which measures up to 2 cm (series 4, image 28).   Another satellite lesion is notable for medial and superiorly. Another   vague lesion is notable within the right posterior hepatic lobe. These   lesions are not readily seen on the prior noncontrast examination. These   lesions were also not seen on theprior MRI examination of the abdomen.    There is redemonstration of cholelithiasis with gallbladder wall   thickening and adjacent fat stranding within the gallbladder fossa. Foci   of gas are notable inside the gallbladder lumen. The previously noted   cholecystostomy tube has been removed.    There is been interval placement of a common bile duct stent. No visible   radiopaque stones are noted adjacent to the stent. There is new   pneumobilia, compatible with stent patency.    The pancreas, spleen, and adrenal glands appear unremarkable.    A small exophytic left-sided renal cyst is notable. Arterial vascular   calcifications are notable in the region of the bilateral renal hilar   areas. There is bilateral renal cortical atrophy. There isno   hydroureteronephrosis. There is mild wall thickening of the urinary   bladder with adjacent fat stranding.    There are multiple scattered nonspecific subcentimeter retroperitoneal   and mesenteric lymph nodes.    There is a duodenal diverticulum. There is no bowel obstruction or   abdominal ascites. Gas and stool are notable throughout the large bowel   loops. The appendix is normal.    The patient is status post hysterectomy. No adnexal masses are   visualized. There is no pelvic free fluid. There is minimal presacral   edema.    Multilevel degenerative changes are noted within the imaged potions of   the spine. There is mild diffuse osteosclerosis which may be compatible   with early renal osteodystrophy. There is an unchanged compression   deformity involving the L2 vertebral body. There is no retropulsion.   There is also a mild superior end plate deformity of T12 which also   appears unchanged.    There is nonspecific mixed lucency and sclerosis on iliac sides of the   bilateral SI joints which appears unchanged.    IMPRESSION:    1. Interval development of rounded low-attenuation lesions scattered   throughout the liver suspicious for a developing abscesses. Recommend   further evaluation with a MRI examination with diffusion-weighted imaging.    2. Interval removal of the percutaneous cholecystostomy tube with   unchanged appearing cholecystitis.    3. Interval placement of a biliary stent with new pneumobilia compatible   with stent patency. The previously noted choledocholithiasis is no longer   or seen.    4. Other findings, as discussed.    Dr. Ed Bob discussed findings with Dr. Rosenthal on 11/26/2018 at 7:16 PM    < end of copied text >

## 2018-11-28 NOTE — PROGRESS NOTE ADULT - PROBLEM SELECTOR PLAN 5
-c/w lipitor, ASA, plavix  -unclear why on DAPT if stent was in 2007 -- try to get collateral -c/w HISS for now  -home lantus dose has not been given and pt's fasting AM glucose was only 153 today so will hold off on lantus for now  -FSG in the 100s for the most part  -consider starting some lantus tomorrow if FSG rising as pt is eating now

## 2018-11-28 NOTE — PROGRESS NOTE ADULT - PROBLEM SELECTOR PLAN 3
-c/w HD M/W/F  -renal recs appreciated It is localized to the back where gown is open. That localization is suggestive of a possible contact dermatitis.   -hydrocortisone cream.

## 2018-11-28 NOTE — PHYSICAL THERAPY INITIAL EVALUATION ADULT - ADDITIONAL COMMENTS
Patient was walking with wheeled walker and assistance pta. Patient was walking with wheeled walker and assistance pta. Pt is on HD M/W/F

## 2018-11-28 NOTE — PROGRESS NOTE ADULT - PROBLEM SELECTOR PLAN 4
am labs pending  elevated lipase  no scan evidence on ct or mri of pancreatitis   no abd ttp on exam  ?possibly r/t renal insufficiency   f/u repeat lipase  monitor abd exam

## 2018-11-29 DIAGNOSIS — R21 RASH AND OTHER NONSPECIFIC SKIN ERUPTION: ICD-10-CM

## 2018-11-29 LAB
ANION GAP SERPL CALC-SCNC: 7 MMOL/L — SIGNIFICANT CHANGE UP (ref 5–17)
BUN SERPL-MCNC: 15 MG/DL — SIGNIFICANT CHANGE UP (ref 7–23)
CALCIUM SERPL-MCNC: 8.2 MG/DL — LOW (ref 8.5–10.1)
CHLORIDE SERPL-SCNC: 103 MMOL/L — SIGNIFICANT CHANGE UP (ref 96–108)
CO2 SERPL-SCNC: 29 MMOL/L — SIGNIFICANT CHANGE UP (ref 22–31)
CREAT SERPL-MCNC: 4.4 MG/DL — HIGH (ref 0.5–1.3)
CULTURE RESULTS: NO GROWTH — SIGNIFICANT CHANGE UP
FERRITIN SERPL-MCNC: 824 NG/ML — HIGH (ref 15–150)
FOLATE SERPL-MCNC: 5.3 NG/ML — SIGNIFICANT CHANGE UP
GLUCOSE SERPL-MCNC: 118 MG/DL — HIGH (ref 70–99)
HCT VFR BLD CALC: 26.8 % — LOW (ref 34.5–45)
HGB BLD-MCNC: 8 G/DL — LOW (ref 11.5–15.5)
IRON SATN MFR SERPL: 22 % — SIGNIFICANT CHANGE UP (ref 14–50)
IRON SATN MFR SERPL: 37 UG/DL — SIGNIFICANT CHANGE UP (ref 30–160)
MCHC RBC-ENTMCNC: 26.5 PG — LOW (ref 27–34)
MCHC RBC-ENTMCNC: 29.9 GM/DL — LOW (ref 32–36)
MCV RBC AUTO: 88.7 FL — SIGNIFICANT CHANGE UP (ref 80–100)
MRSA PCR RESULT.: SIGNIFICANT CHANGE UP
NRBC # BLD: 0 /100 WBCS — SIGNIFICANT CHANGE UP (ref 0–0)
PLATELET # BLD AUTO: 325 K/UL — SIGNIFICANT CHANGE UP (ref 150–400)
POTASSIUM SERPL-MCNC: 3.8 MMOL/L — SIGNIFICANT CHANGE UP (ref 3.5–5.3)
POTASSIUM SERPL-SCNC: 3.8 MMOL/L — SIGNIFICANT CHANGE UP (ref 3.5–5.3)
RBC # BLD: 3.02 M/UL — LOW (ref 3.8–5.2)
RBC # FLD: 16.6 % — HIGH (ref 10.3–14.5)
S AUREUS DNA NOSE QL NAA+PROBE: SIGNIFICANT CHANGE UP
SODIUM SERPL-SCNC: 139 MMOL/L — SIGNIFICANT CHANGE UP (ref 135–145)
SPECIMEN SOURCE: SIGNIFICANT CHANGE UP
TIBC SERPL-MCNC: 167 UG/DL — LOW (ref 220–430)
UIBC SERPL-MCNC: 130 UG/DL — SIGNIFICANT CHANGE UP (ref 110–370)
VIT B12 SERPL-MCNC: 871 PG/ML — SIGNIFICANT CHANGE UP (ref 232–1245)
WBC # BLD: 8.35 K/UL — SIGNIFICANT CHANGE UP (ref 3.8–10.5)
WBC # FLD AUTO: 8.35 K/UL — SIGNIFICANT CHANGE UP (ref 3.8–10.5)

## 2018-11-29 PROCEDURE — 99233 SBSQ HOSP IP/OBS HIGH 50: CPT

## 2018-11-29 PROCEDURE — 77001 FLUOROGUIDE FOR VEIN DEVICE: CPT | Mod: 26

## 2018-11-29 PROCEDURE — 36558 INSERT TUNNELED CV CATH: CPT

## 2018-11-29 PROCEDURE — 76937 US GUIDE VASCULAR ACCESS: CPT | Mod: 26

## 2018-11-29 RX ORDER — FOLIC ACID 0.8 MG
1 TABLET ORAL DAILY
Qty: 0 | Refills: 0 | Status: DISCONTINUED | OUTPATIENT
Start: 2018-11-29 | End: 2018-12-07

## 2018-11-29 RX ORDER — ERTAPENEM SODIUM 1 G/1
0.5 INJECTION, POWDER, LYOPHILIZED, FOR SOLUTION INTRAMUSCULAR; INTRAVENOUS
Qty: 19 | Refills: 0 | OUTPATIENT
Start: 2018-11-29 | End: 2019-01-04

## 2018-11-29 RX ADMIN — ATORVASTATIN CALCIUM 80 MILLIGRAM(S): 80 TABLET, FILM COATED ORAL at 21:33

## 2018-11-29 RX ADMIN — TAMSULOSIN HYDROCHLORIDE 0.4 MILLIGRAM(S): 0.4 CAPSULE ORAL at 21:33

## 2018-11-29 RX ADMIN — Medication 100 MILLIGRAM(S): at 17:06

## 2018-11-29 RX ADMIN — CHLORHEXIDINE GLUCONATE 1 APPLICATION(S): 213 SOLUTION TOPICAL at 12:07

## 2018-11-29 RX ADMIN — Medication 60 MILLIGRAM(S): at 05:15

## 2018-11-29 RX ADMIN — GABAPENTIN 300 MILLIGRAM(S): 400 CAPSULE ORAL at 17:06

## 2018-11-29 RX ADMIN — Medication 50 MILLIGRAM(S): at 17:06

## 2018-11-29 RX ADMIN — Medication 81 MILLIGRAM(S): at 11:46

## 2018-11-29 RX ADMIN — HEPARIN SODIUM 5000 UNIT(S): 5000 INJECTION INTRAVENOUS; SUBCUTANEOUS at 17:06

## 2018-11-29 RX ADMIN — Medication 1 TABLET(S): at 17:06

## 2018-11-29 RX ADMIN — Medication 1: at 16:56

## 2018-11-29 RX ADMIN — Medication 3: at 11:51

## 2018-11-29 RX ADMIN — Medication 100 MILLIGRAM(S): at 05:16

## 2018-11-29 RX ADMIN — Medication 1 TABLET(S): at 11:46

## 2018-11-29 RX ADMIN — PANTOPRAZOLE SODIUM 40 MILLIGRAM(S): 20 TABLET, DELAYED RELEASE ORAL at 05:15

## 2018-11-29 RX ADMIN — SENNA PLUS 2 TABLET(S): 8.6 TABLET ORAL at 21:33

## 2018-11-29 RX ADMIN — Medication 1 TABLET(S): at 07:42

## 2018-11-29 RX ADMIN — ISOSORBIDE MONONITRATE 30 MILLIGRAM(S): 60 TABLET, EXTENDED RELEASE ORAL at 11:46

## 2018-11-29 RX ADMIN — HEPARIN SODIUM 5000 UNIT(S): 5000 INJECTION INTRAVENOUS; SUBCUTANEOUS at 05:15

## 2018-11-29 RX ADMIN — CLOPIDOGREL BISULFATE 75 MILLIGRAM(S): 75 TABLET, FILM COATED ORAL at 11:47

## 2018-11-29 RX ADMIN — Medication 50 MILLIGRAM(S): at 05:16

## 2018-11-29 RX ADMIN — GABAPENTIN 300 MILLIGRAM(S): 400 CAPSULE ORAL at 05:15

## 2018-11-29 RX ADMIN — ERTAPENEM SODIUM 100 MILLIGRAM(S): 1 INJECTION, POWDER, LYOPHILIZED, FOR SOLUTION INTRAMUSCULAR; INTRAVENOUS at 16:56

## 2018-11-29 RX ADMIN — Medication 1 SUPPOSITORY(S): at 11:46

## 2018-11-29 NOTE — PROGRESS NOTE ADULT - SUBJECTIVE AND OBJECTIVE BOX
CHIEF COMPLAINT/ PRESENT FINDINGS:    hx retention  aguilar in place       ****************************************************************************************************  PHYSICAL EXAM:    Vital Signs Last 24 Hrs  T(C): 36.6 (29 Nov 2018 05:07), Max: 36.9 (28 Nov 2018 14:44)  T(F): 97.8 (29 Nov 2018 05:07), Max: 98.5 (28 Nov 2018 14:44)  HR: 74 (29 Nov 2018 05:07) (74 - 75)  BP: 154/79 (29 Nov 2018 05:07) (149/73 - 154/79)  BP(mean): --  RR: 16 (29 Nov 2018 05:07) (16 - 18)  SpO2: 94% (29 Nov 2018 05:07) (94% - 100%)    GENERAL:  alert     ABDOMEN: soft  urine per aguilar clear     BACK:    LOWER EXTREMITIES:    NEUROLOGICAL:    **********************************************************************************************************  LABS:                        8.0    8.35  )-----------( 325      ( 29 Nov 2018 08:49 )             26.8     11-29    139  |  103  |  15  ----------------------------<  118<H>  3.8   |  29  |  4.40<H>    Ca    8.2<L>      29 Nov 2018 08:49    TPro  7.0  /  Alb  1.7<L>  /  TBili  0.5  /  DBili  .30<H>  /  AST  23  /  ALT  13  /  AlkPhos  219<H>  11-28    PT/INR - ( 28 Nov 2018 08:54 )   PT: 14.0 sec;   INR: 1.22 ratio             Urine Culture: 11-27 @ 00:16  Urine Culure Results   No growth to date.  Organism --      RADIOLOGY & ADDITIONAL STUDIES:      IMPRESSION: hx retention  esrd on dialysis     PLAN: voiding trial on flomax  suggested

## 2018-11-29 NOTE — PROGRESS NOTE ADULT - SUBJECTIVE AND OBJECTIVE BOX
[facility], Division of Infectious Diseases  [facility]  [phone]    Name: IRENE TAYLOR  Age: 68y  Gender: Female  MRN: [id number]    Interval History--  Notes reviewed. Seen earlier this am. No family at bedside.    Past Medical History--  ESRD (end stage renal disease) on dialysis  CAD (coronary artery disease)  Diabetes  CRF (chronic renal failure)  Hypertension  Pneumonia  Myocardial infarction  Hypertension  Diabetes  H/O: hysterectomy      For details regarding the patient's social history, family history, and other miscellaneous elements, please refer the initial infectious diseases consultation and/or the admitting history and physical examination for this admission.    Allergies    No Known Drug Allergies  Purell (Rash)    Intolerances        Medications--  Antibiotics:  ertapenem  IVPB 500 milliGRAM(s) IV Intermittent every 24 hours    Immunologic:  epoetin analilia Injectable 23516 Unit(s) IV Push <User Schedule>    Other:  aspirin enteric coated  atorvastatin  chlorhexidine 2% Cloths  clopidogrel Tablet  dextrose 40% Gel PRN  dextrose 5%.  dextrose 50% Injectable  dextrose 50% Injectable  dextrose 50% Injectable  docusate sodium  gabapentin  glucagon  Injectable PRN  glycerin Suppository - Adult  heparin  Injectable  insulin lispro (HumaLOG) corrective regimen sliding scale  insulin lispro (HumaLOG) corrective regimen sliding scale  isosorbide   mononitrate ER Tablet (IMDUR)  lactobacillus acidophilus  metoprolol tartrate  NIFEdipine XL  ondansetron Injectable PRN  pantoprazole    Tablet  senna  simethicone PRN  tamsulosin      Review of Systems--  A 10-point review of systems was obtained.   Review of systems otherwise negative except as previously noted.    Physical Examination--  Vital Signs: T(F): 97.8 (11-29-18 @ 05:07), Max: 98.5 (11-28-18 @ 14:44)  HR: 74 (11-29-18 @ 05:07)  BP: 154/79 (11-29-18 @ 05:07)  RR: 16 (11-29-18 @ 05:07)  SpO2: 94% (11-29-18 @ 05:07)  Wt(kg): --  HEENT: AT/NC. Anicteric. Conjunctiva pink and moist. Oropharynx clear.  Neck: Not rigid. No sense of mass.  Nodes: None palpable.  Chest: HD cath C/D/I  Lungs: Diminished breath sounds bilaterally without rales, wheezing or rhonchi  Heart: Regular rate and rhythm. No Murmur. No rub. No gallop. No palpable thrill.  Abdomen: Bowel sounds present and normoactive. Soft. Nondistended. Nontender. No sense of mass. No organomegaly.  Extremities: No cyanosis or clubbing. 1+ edema.   Skin: Warm. Dry. Good turgor. No rash. No vasculitic stigmata.  Psychiatric: Appropriate affect and mood for situation.       Laboratory Studies--  CBC                        8.0    8.35  )-----------( 325      ( 29 Nov 2018 08:49 )             26.8       Chemistries  11-29    139  |  103  |  15  ----------------------------<  118<H>  3.8   |  29  |  4.40<H>    Ca    8.2<L>      29 Nov 2018 08:49    TPro  7.0  /  Alb  1.7<L>  /  TBili  0.5  /  DBili  .30<H>  /  AST  23  /  ALT  13  /  AlkPhos  219<H>  11-28      Culture Data    Culture - Blood (collected 27 Nov 2018 00:16)  Source: .Blood Blood  Preliminary Report (28 Nov 2018 01:05):    No growth to date.    Culture - Blood (collected 27 Nov 2018 00:16)  Source: .Blood Blood  Preliminary Report (28 Nov 2018 01:05):    No growth to date.

## 2018-11-29 NOTE — PROGRESS NOTE ADULT - PROBLEM SELECTOR PLAN 3
rash was localized to the back where gown is open. That localization is suggestive of a possible contact dermatitis.   -hydrocortisone cream placed with great improvement of the rash

## 2018-11-29 NOTE — PROGRESS NOTE ADULT - SUBJECTIVE AND OBJECTIVE BOX
INTERVAL HPI/OVERNIGHT EVENTS:  pt seen and examined  denies abd pain  per overnight rn no c/o abd pain, no vomiting, no s/s overt gib, bm x2  afebrile overnight labs pending    MEDICATIONS  (STANDING):  aspirin enteric coated 81 milliGRAM(s) Oral daily  atorvastatin 80 milliGRAM(s) Oral at bedtime  chlorhexidine 2% Cloths 1 Application(s) Topical daily  clopidogrel Tablet 75 milliGRAM(s) Oral daily  dextrose 5%. 1000 milliLiter(s) (50 mL/Hr) IV Continuous <Continuous>  dextrose 50% Injectable 12.5 Gram(s) IV Push once  dextrose 50% Injectable 25 Gram(s) IV Push once  dextrose 50% Injectable 25 Gram(s) IV Push once  docusate sodium 100 milliGRAM(s) Oral two times a day  epoetin analilia Injectable 82078 Unit(s) IV Push <User Schedule>  ertapenem  IVPB 500 milliGRAM(s) IV Intermittent every 24 hours  gabapentin 300 milliGRAM(s) Oral two times a day  glycerin Suppository - Adult 1 Suppository(s) Rectal daily  heparin  Injectable 5000 Unit(s) SubCutaneous every 12 hours  insulin lispro (HumaLOG) corrective regimen sliding scale   SubCutaneous three times a day before meals  insulin lispro (HumaLOG) corrective regimen sliding scale   SubCutaneous at bedtime  isosorbide   mononitrate ER Tablet (IMDUR) 30 milliGRAM(s) Oral daily  lactobacillus acidophilus 1 Tablet(s) Oral three times a day with meals  metoprolol tartrate 50 milliGRAM(s) Oral two times a day  NIFEdipine XL 60 milliGRAM(s) Oral daily  pantoprazole    Tablet 40 milliGRAM(s) Oral before breakfast  senna 2 Tablet(s) Oral at bedtime  tamsulosin 0.4 milliGRAM(s) Oral at bedtime    MEDICATIONS  (PRN):  dextrose 40% Gel 15 Gram(s) Oral once PRN Blood Glucose LESS THAN 70 milliGRAM(s)/deciliter  glucagon  Injectable 1 milliGRAM(s) IntraMuscular once PRN Glucose LESS THAN 70 milligrams/deciliter  ondansetron Injectable 4 milliGRAM(s) IV Push every 6 hours PRN Nausea  simethicone 80 milliGRAM(s) Chew three times a day PRN Gas      Allergies    No Known Drug Allergies  Purell (Rash)    Intolerances        Review of Systems:    General:  No wt loss, fevers, chills, night sweats, fatigue   Eyes:  Good vision, no reported pain  ENT:  No sore throat, pain, runny nose, dysphagia  CV:  No pain, palpitations, hypo/hypertension  Resp:  No dyspnea, cough, tachypnea, wheezing  GI:  No pain, No nausea, No vomiting, No diarrhea, No constipation, No weight loss, No fever, No pruritis, No rectal bleeding, No melena, No dysphagia  :  No pain, bleeding, incontinence, nocturia  Muscle:  No pain, weakness  Neuro:  No weakness, tingling, memory problems  Psych:  No fatigue, insomnia, mood problems, depression  Endocrine:  No polyuria, polydypsia, cold/heat intolerance  Heme:  No petechiae, ecchymosis, easy bruisability  Skin:  No rash, tattoos, scars, edema      Vital Signs Last 24 Hrs  T(C): 36.6 (29 Nov 2018 05:07), Max: 36.9 (28 Nov 2018 14:44)  T(F): 97.8 (29 Nov 2018 05:07), Max: 98.5 (28 Nov 2018 14:44)  HR: 74 (29 Nov 2018 05:07) (74 - 75)  BP: 154/79 (29 Nov 2018 05:07) (134/56 - 154/79)  BP(mean): --  RR: 16 (29 Nov 2018 05:07) (16 - 20)  SpO2: 94% (29 Nov 2018 05:07) (94% - 100%)    PHYSICAL EXAM:    General:  lying in bed in nad  HEENT:  NC/AT  Chest: dec bs  Cardiovascular:  Regular rhythm, S1, S2  Abdomen: obese abd soft nt mild dt+ ecchymosis  Extremities:  no edema  Skin:  scattered ecchymosis  Neuro/Psych:  Awake alert responds appropriately      LABS:                        8.3    9.71  )-----------( 339      ( 28 Nov 2018 08:54 )             27.2     11-28    137  |  102  |  32<H>  ----------------------------<  126<H>  3.9   |  26  |  8.00<H>    Ca    8.0<L>      28 Nov 2018 08:54    TPro  7.0  /  Alb  1.7<L>  /  TBili  0.5  /  DBili  .30<H>  /  AST  23  /  ALT  13  /  AlkPhos  219<H>  11-28    PT/INR - ( 28 Nov 2018 08:54 )   PT: 14.0 sec;   INR: 1.22 ratio               RADIOLOGY & ADDITIONAL TESTS:

## 2018-11-29 NOTE — PROGRESS NOTE ADULT - PROBLEM SELECTOR PLAN 5
-c/w HISS for now  -home lantus dose has not been given and pt's fasting AM glucose was only 134 today so will hold off on lantus for now   -FSG in the 100s for the most part (the high value of 282 was because pt ate food form home prior to checking the lunchtime FSG)  -consider starting some lantus tomorrow if FSG rising

## 2018-11-29 NOTE — PROGRESS NOTE ADULT - PROBLEM SELECTOR PLAN 4
elevated lipase, now resolved, suspect possibly 2/2 renal insufficiency as no scan evidence on ct or mri of pancreatitis no upper abd ttp on exam

## 2018-11-29 NOTE — PROGRESS NOTE ADULT - SUBJECTIVE AND OBJECTIVE BOX
Interventional Radiology Brief- Operative Note    Operators: Justo Lewis MD    Procedure:   left neck US  Left jugular vein access  placement of a tunneled left IJ approach PICC     Post-op Dx: bacteremia requiring long term venous access for antibiotics    EBL: 15 mL    Medications: 1% lidocaine    Contrast: none    Complications: no immediate complications    Findings/Plan:   patent Left IJ  successful placement of a left IJ tunneled PICC with the tip in the superior cavoatrial junction  catheter is ready for immediate use

## 2018-11-29 NOTE — PROGRESS NOTE ADULT - PROBLEM SELECTOR PLAN 8
-likely anemia of chronic disease and due to ESRD  -procrit as per nephrology  -H&H near baseline, no bleeding on ROS  -check iron and TIBC, ferritin, folate, B12

## 2018-11-29 NOTE — CONSULT NOTE ADULT - ASSESSMENT
69 yo with hx of cholecystitis, s/p cholecystostomy with liver abscesses     cont abx for now     serial abdominal exams

## 2018-11-29 NOTE — PROGRESS NOTE ADULT - SUBJECTIVE AND OBJECTIVE BOX
Patient is a 68y old  Female who presents with a chief complaint of abdominal pain (28 Nov 2018 08:26)      Patient seen in follow up for ESRD on HD. [place] catheter placed for urinary retention.     PAST MEDICAL HISTORY:  ESRD (end stage renal disease) on dialysis  CAD (coronary artery disease)  Diabetes  CRF (chronic renal failure)  Hypertension  Pneumonia  Myocardial infarction  Hypertension  Diabetes     MEDICATIONS  (STANDING):  aspirin enteric coated 81 milliGRAM(s) Oral daily  atorvastatin 80 milliGRAM(s) Oral at bedtime  chlorhexidine 2% Cloths 1 Application(s) Topical daily  clopidogrel Tablet 75 milliGRAM(s) Oral daily  dextrose 5%. 1000 milliLiter(s) (50 mL/Hr) IV Continuous <Continuous>  dextrose 50% Injectable 12.5 Gram(s) IV Push once  dextrose 50% Injectable 25 Gram(s) IV Push once  dextrose 50% Injectable 25 Gram(s) IV Push once  docusate sodium 100 milliGRAM(s) Oral two times a day  epoetin analilia Injectable 38717 Unit(s) IV Push <User Schedule>  ertapenem  IVPB 500 milliGRAM(s) IV Intermittent every 24 hours  gabapentin 300 milliGRAM(s) Oral two times a day  glycerin Suppository - Adult 1 Suppository(s) Rectal daily  heparin  Injectable 5000 Unit(s) SubCutaneous every 12 hours  insulin lispro (HumaLOG) corrective regimen sliding scale   SubCutaneous three times a day before meals  insulin lispro (HumaLOG) corrective regimen sliding scale   SubCutaneous at bedtime  isosorbide   mononitrate ER Tablet (IMDUR) 30 milliGRAM(s) Oral daily  lactobacillus acidophilus 1 Tablet(s) Oral three times a day with meals  metoprolol tartrate 50 milliGRAM(s) Oral two times a day  NIFEdipine XL 60 milliGRAM(s) Oral daily  pantoprazole    Tablet 40 milliGRAM(s) Oral before breakfast  senna 2 Tablet(s) Oral at bedtime  tamsulosin 0.4 milliGRAM(s) Oral at bedtime    MEDICATIONS  (PRN):  dextrose 40% Gel 15 Gram(s) Oral once PRN Blood Glucose LESS THAN 70 milliGRAM(s)/deciliter  glucagon  Injectable 1 milliGRAM(s) IntraMuscular once PRN Glucose LESS THAN 70 milligrams/deciliter  ondansetron Injectable 4 milliGRAM(s) IV Push every 6 hours PRN Nausea  simethicone 80 milliGRAM(s) Chew three times a day PRN Gas    T(C): 36.6 (11-29-18 @ 05:07), Max: 37.2 (11-27-18 @ 21:13)  HR: 74 (11-29-18 @ 05:07) (73 - 79)  BP: 154/79 (11-29-18 @ 05:07) (134/56 - 160/76)  RR: 16 (11-29-18 @ 05:07)  SpO2: 94% (11-29-18 @ 05:07)  Wt(kg): --  I&O's Detail    28 Nov 2018 07:01  -  29 Nov 2018 07:00  --------------------------------------------------------  IN:  Total IN: 0 mL    OUT:    Indwelling Catheter - Urethral: 150 mL    Other: 2000 mL  Total OUT: 2150 mL    Total NET: -2150 mL        PHYSICAL EXAM:  General: NAD, Rt neck dialysis catheter  Respiratory: b/l air entry  Cardiovascular: S1 S2  Gastrointestinal: soft  Extremities:  edema                      LABORATORY:                        8.0    8.35  )-----------( 325      ( 29 Nov 2018 08:49 )             26.8     11-29    139  |  103  |  15  ----------------------------<  118<H>  3.8   |  29  |  4.40<H>    Ca    8.2<L>      29 Nov 2018 08:49    TPro  7.0  /  Alb  1.7<L>  /  TBili  0.5  /  DBili  .30<H>  /  AST  23  /  ALT  13  /  AlkPhos  219<H>  11-28    Sodium, Serum: 139 mmol/L (11-29 @ 08:49)  Sodium, Serum: 137 mmol/L (11-28 @ 08:54)    Potassium, Serum: 3.8 mmol/L (11-29 @ 08:49)  Potassium, Serum: 3.9 mmol/L (11-28 @ 08:54)    Hemoglobin: 8.0 g/dL (11-29 @ 08:49)  Hemoglobin: 8.3 g/dL (11-28 @ 08:54)  Hemoglobin: 8.3 g/dL (11-27 @ 08:55)  Hemoglobin: 9.3 g/dL (11-26 @ 16:45)    Creatinine, Serum 4.40 (11-29 @ 08:49)  Creatinine, Serum 8.00 (11-28 @ 08:54)  Creatinine, Serum 7.10 (11-27 @ 08:55)  Creatinine, Serum 6.30 (11-26 @ 16:45)        LIVER FUNCTIONS - ( 28 Nov 2018 08:54 )  Alb: 1.7 g/dL / Pro: 7.0 g/dL / ALK PHOS: 219 U/L / ALT: 13 U/L / AST: 23 U/L / GGT: x

## 2018-11-29 NOTE — PROGRESS NOTE ADULT - SUBJECTIVE AND OBJECTIVE BOX
Patient is a 68y old  Female who presents with a chief complaint of abdominal pain (29 Nov 2018 16:39)      INTERVAL HPI/OVERNIGHT EVENTS: Pt refuses  phone and wishes her granddaughter at bedside to translate. Pt reports feeling "good." Denies fever, chills, nausea, vomiting, flank pain.     MEDICATIONS  (STANDING):  aspirin enteric coated 81 milliGRAM(s) Oral daily  atorvastatin 80 milliGRAM(s) Oral at bedtime  chlorhexidine 2% Cloths 1 Application(s) Topical daily  clopidogrel Tablet 75 milliGRAM(s) Oral daily  dextrose 5%. 1000 milliLiter(s) (50 mL/Hr) IV Continuous <Continuous>  dextrose 50% Injectable 12.5 Gram(s) IV Push once  dextrose 50% Injectable 25 Gram(s) IV Push once  dextrose 50% Injectable 25 Gram(s) IV Push once  docusate sodium 100 milliGRAM(s) Oral two times a day  epoetin analilia Injectable 89834 Unit(s) IV Push <User Schedule>  ertapenem  IVPB 500 milliGRAM(s) IV Intermittent every 24 hours  folic acid 1 milliGRAM(s) Oral daily  gabapentin 300 milliGRAM(s) Oral two times a day  glycerin Suppository - Adult 1 Suppository(s) Rectal daily  heparin  Injectable 5000 Unit(s) SubCutaneous every 12 hours  insulin lispro (HumaLOG) corrective regimen sliding scale   SubCutaneous three times a day before meals  insulin lispro (HumaLOG) corrective regimen sliding scale   SubCutaneous at bedtime  isosorbide   mononitrate ER Tablet (IMDUR) 30 milliGRAM(s) Oral daily  lactobacillus acidophilus 1 Tablet(s) Oral three times a day with meals  metoprolol tartrate 50 milliGRAM(s) Oral two times a day  NIFEdipine XL 60 milliGRAM(s) Oral daily  pantoprazole    Tablet 40 milliGRAM(s) Oral before breakfast  senna 2 Tablet(s) Oral at bedtime  tamsulosin 0.4 milliGRAM(s) Oral at bedtime    MEDICATIONS  (PRN):  dextrose 40% Gel 15 Gram(s) Oral once PRN Blood Glucose LESS THAN 70 milliGRAM(s)/deciliter  glucagon  Injectable 1 milliGRAM(s) IntraMuscular once PRN Glucose LESS THAN 70 milligrams/deciliter  ondansetron Injectable 4 milliGRAM(s) IV Push every 6 hours PRN Nausea  simethicone 80 milliGRAM(s) Chew three times a day PRN Gas      Allergies    No Known Drug Allergies  Purell (Rash)    Intolerances        REVIEW OF SYSTEMS:  CONSTITUTIONAL: No fever or chills  HEENT:  No headache, no sore throat  RESPIRATORY: No cough, wheezing, or shortness of breath  CARDIOVASCULAR: No chest pain, palpitations, or leg swelling  GASTROINTESTINAL: abd pain resolved, No nausea, vomiting, or diarrhea  GENITOURINARY: aguilar catheter in place. No hematuria or flank pain  NEUROLOGICAL: no focal weakness or dizziness  MUSCULOSKELETAL: no myalgias    SKIN: rash improved    Vital Signs Last 24 Hrs  T(C): 36.5 (29 Nov 2018 13:47), Max: 36.8 (28 Nov 2018 19:41)  T(F): 97.7 (29 Nov 2018 13:47), Max: 98.3 (28 Nov 2018 19:41)  HR: 75 (29 Nov 2018 17:05) (74 - 79)  BP: 149/77 (29 Nov 2018 17:05) (145/76 - 154/79)  BP(mean): --  RR: 16 (29 Nov 2018 13:47) (16 - 18)  SpO2: 95% (29 Nov 2018 13:47) (94% - 95%)    PHYSICAL EXAM:  GENERAL: NAD, obese  HEENT:  anicteric, moist mucous membranes  CHEST/LUNG:  CTA b/l, no rales, wheezes, or rhonchi ; R chest permacath  HEART:  RRR, S1, S2  ABDOMEN:  BS+, soft, nontender, nondistended  BACK: no CVA tenderness; rash improved  EXTREMITIES: no edema, cyanosis, or calf tenderness  NERVOUS SYSTEM: answering questions and following commands    LABS:                        8.0    8.35  )-----------( 325      ( 29 Nov 2018 08:49 )             26.8     CBC Full  -  ( 29 Nov 2018 08:49 )  WBC Count : 8.35 K/uL  Hemoglobin : 8.0 g/dL  Hematocrit : 26.8 %  Platelet Count - Automated : 325 K/uL  Mean Cell Volume : 88.7 fl  Mean Cell Hemoglobin : 26.5 pg  Mean Cell Hemoglobin Concentration : 29.9 gm/dL  Auto Neutrophil # : x  Auto Lymphocyte # : x  Auto Monocyte # : x  Auto Eosinophil # : x  Auto Basophil # : x  Auto Neutrophil % : x  Auto Lymphocyte % : x  Auto Monocyte % : x  Auto Eosinophil % : x  Auto Basophil % : x    29 Nov 2018 08:49    139    |  103    |  15     ----------------------------<  118    3.8     |  29     |  4.40     Ca    8.2        29 Nov 2018 08:49      PT/INR - ( 28 Nov 2018 08:54 )   PT: 14.0 sec;   INR: 1.22 ratio             CAPILLARY BLOOD GLUCOSE      POCT Blood Glucose.: 196 mg/dL (29 Nov 2018 16:44)  POCT Blood Glucose.: 282 mg/dL (29 Nov 2018 11:40)   (ate food from home prior to this reading)  POCT Blood Glucose.: 134 mg/dL (29 Nov 2018 07:44)  POCT Blood Glucose.: 158 mg/dL (28 Nov 2018 20:56)        Culture - Blood (collected 11-27-18 @ 00:16)  Source: .Blood Blood  Preliminary Report (11-28-18 @ 01:05):    No growth to date.    Culture - Blood (collected 11-27-18 @ 00:16)  Source: .Blood Blood  Preliminary Report (11-28-18 @ 01:05):    No growth to date.        RADIOLOGY & ADDITIONAL TESTS:    Personally reviewed.     Consultant(s) Notes Reviewed:  [x] YES  [ ] NO

## 2018-11-29 NOTE — CONSULT NOTE ADULT - SUBJECTIVE AND OBJECTIVE BOX
69 yo female with multiple medical problems presented to ER with abdominal pain during dialysis.  PT known to my service due to recent admission for cholangitis s/p percutaneous cholecystostomy tube and ERCP at outside facility.  PT discharged and now returns with abdominal pain to lower abdomen during dialysis. CT shows for hepatic abscesses.  Current pt without complaitns    REVIEW OF SYSTEMS:    CONSTITUTIONAL: No weakness, fatigue, malaise, fevers or chills, no weight change, appetite change  EYES: No visual changes; No double vision,  No vertigo, eye pain  Ears: no otalgia, no otorhea, no hearing loss, tinnitus  Nose: no epistaxis, rhinorrhea, sinus pressure  Throat: no throat pain, no oral lesions  NECK: No pain or stiffness  RESPIRATORY: No cough (productive or dry), wheezing, hemoptysis; No shortness of breath  CARDIOVASCULAR: No chest pain or palpitations,    GASTROINTESTINAL: No abdominal or epigastric pain. No nausea, vomiting, or hematemesis; No diarrhea or constipation. No melena or hematochezia.  GENITOURINARY: No dysuria, frequency, urgency or hematuria,    NEUROLOGICAL: No numbness or weakness, headache, memory loss,   SKIN: No pruritis, rashes, lesions or new moles  Psych: No anxiety, sadness, insomnia,    Endocrine: No Heat or Cold intolerance, polydipsia, polyphagia  Heme/Lymph: no LN enlargement, no easy bruising or bleeding       PAST MEDICAL & SURGICAL HISTORY:  ESRD (end stage renal disease) on dialysis: MWF  CAD (coronary artery disease): s/p stent in 2007  Diabetes  Myocardial infarction  Hypertension  H/O: hysterectomy  Allergies    No Known Drug Allergies  Purell (Rash)    Intolerances    MEDICATIONS  (STANDING):  aspirin enteric coated 81 milliGRAM(s) Oral daily  atorvastatin 80 milliGRAM(s) Oral at bedtime  chlorhexidine 2% Cloths 1 Application(s) Topical daily  clopidogrel Tablet 75 milliGRAM(s) Oral daily  dextrose 5%. 1000 milliLiter(s) (50 mL/Hr) IV Continuous <Continuous>  dextrose 50% Injectable 12.5 Gram(s) IV Push once  dextrose 50% Injectable 25 Gram(s) IV Push once  dextrose 50% Injectable 25 Gram(s) IV Push once  docusate sodium 100 milliGRAM(s) Oral two times a day  epoetin analilia Injectable 33206 Unit(s) IV Push <User Schedule>  ertapenem  IVPB 500 milliGRAM(s) IV Intermittent every 24 hours  gabapentin 300 milliGRAM(s) Oral two times a day  glycerin Suppository - Adult 1 Suppository(s) Rectal daily  heparin  Injectable 5000 Unit(s) SubCutaneous every 12 hours  insulin lispro (HumaLOG) corrective regimen sliding scale   SubCutaneous three times a day before meals  insulin lispro (HumaLOG) corrective regimen sliding scale   SubCutaneous at bedtime  isosorbide   mononitrate ER Tablet (IMDUR) 30 milliGRAM(s) Oral daily  lactobacillus acidophilus 1 Tablet(s) Oral three times a day with meals  metoprolol tartrate 50 milliGRAM(s) Oral two times a day  NIFEdipine XL 60 milliGRAM(s) Oral daily  pantoprazole    Tablet 40 milliGRAM(s) Oral before breakfast  senna 2 Tablet(s) Oral at bedtime  tamsulosin 0.4 milliGRAM(s) Oral at bedtime    MEDICATIONS  (PRN):  dextrose 40% Gel 15 Gram(s) Oral once PRN Blood Glucose LESS THAN 70 milliGRAM(s)/deciliter  glucagon  Injectable 1 milliGRAM(s) IntraMuscular once PRN Glucose LESS THAN 70 milligrams/deciliter  ondansetron Injectable 4 milliGRAM(s) IV Push every 6 hours PRN Nausea  simethicone 80 milliGRAM(s) Chew three times a day PRN Gas    Allergies    No Known Drug Allergies  Purell (Rash)    Intolerances    .  VITAL SIGNS:  T(C): 36.6 (11-29-18 @ 05:07), Max: 36.9 (11-28-18 @ 14:44)  T(F): 97.8 (11-29-18 @ 05:07), Max: 98.5 (11-28-18 @ 14:44)  HR: 74 (11-29-18 @ 05:07) (74 - 75)  BP: 154/79 (11-29-18 @ 05:07) (134/56 - 154/79)  BP(mean): --  RR: 16 (11-29-18 @ 05:07) (16 - 20)  SpO2: 94% (11-29-18 @ 05:07) (94% - 100%)  Wt(kg): --    PHYSICAL EXAM:    Constitutional:  NAD  Head: NC/AT  Eyes: PERRL b/l  ENT: MMM  Neck: supple; no JVD or thyromegaly  Respiratory: CTA B/L   Cardiac: +S1/S2; RRR   Gastrointestinal: soft, NT/ND; no rebound or guarding; + BS                          8.0    8.35  )-----------( 325      ( 29 Nov 2018 08:49 )             26.8   11-29    139  |  103  |  15  ----------------------------<  118<H>  3.8   |  29  |  4.40<H>    Ca    8.2<L>      29 Nov 2018 08:49    TPro  7.0  /  Alb  1.7<L>  /  TBili  0.5  /  DBili  .30<H>  /  AST  23  /  ALT  13  /  AlkPhos  219<H>  11-28  < from: CT Abdomen and Pelvis w/ IV Cont (11.26.18 @ 18:54) >    IMPRESSION:    1. Interval development of rounded low-attenuation lesions scattered   throughout the liver suspicious for a developing abscesses. Recommend   further evaluation with a MRI examination with diffusion-weighted imaging.    2. Interval removal of the percutaneous cholecystostomy tube with   unchanged appearing cholecystitis.    3. Interval placement of a biliary stent with new pneumobilia compatible   with stent patency. The previously noted choledocholithiasis is no longer   or seen.      < end of copied text >

## 2018-11-29 NOTE — PROGRESS NOTE ADULT - PROBLEM SELECTOR PLAN 3
am labs pending  likely multifactorial, acute illness, chronic dz, esrd  around baseline from previous admission  no overt s/s gib  trend h/h, transfuse prn  cont ppi  on epo  iron studies pending  hold anti plts in setting of active gib

## 2018-11-30 LAB
ANION GAP SERPL CALC-SCNC: 5 MMOL/L — SIGNIFICANT CHANGE UP (ref 5–17)
BUN SERPL-MCNC: 24 MG/DL — HIGH (ref 7–23)
CALCIUM SERPL-MCNC: 8.3 MG/DL — LOW (ref 8.5–10.1)
CHLORIDE SERPL-SCNC: 103 MMOL/L — SIGNIFICANT CHANGE UP (ref 96–108)
CO2 SERPL-SCNC: 31 MMOL/L — SIGNIFICANT CHANGE UP (ref 22–31)
CREAT SERPL-MCNC: 5.8 MG/DL — HIGH (ref 0.5–1.3)
GLUCOSE SERPL-MCNC: 150 MG/DL — HIGH (ref 70–99)
HCT VFR BLD CALC: 27.1 % — LOW (ref 34.5–45)
HGB BLD-MCNC: 8 G/DL — LOW (ref 11.5–15.5)
MAGNESIUM SERPL-MCNC: 1.9 MG/DL — SIGNIFICANT CHANGE UP (ref 1.6–2.6)
MCHC RBC-ENTMCNC: 26.6 PG — LOW (ref 27–34)
MCHC RBC-ENTMCNC: 29.5 GM/DL — LOW (ref 32–36)
MCV RBC AUTO: 90 FL — SIGNIFICANT CHANGE UP (ref 80–100)
NRBC # BLD: 0 /100 WBCS — SIGNIFICANT CHANGE UP (ref 0–0)
PHOSPHATE SERPL-MCNC: 3.6 MG/DL — SIGNIFICANT CHANGE UP (ref 2.5–4.5)
PLATELET # BLD AUTO: 336 K/UL — SIGNIFICANT CHANGE UP (ref 150–400)
POTASSIUM SERPL-MCNC: 3.8 MMOL/L — SIGNIFICANT CHANGE UP (ref 3.5–5.3)
POTASSIUM SERPL-SCNC: 3.8 MMOL/L — SIGNIFICANT CHANGE UP (ref 3.5–5.3)
RBC # BLD: 3.01 M/UL — LOW (ref 3.8–5.2)
RBC # FLD: 16.5 % — HIGH (ref 10.3–14.5)
SODIUM SERPL-SCNC: 139 MMOL/L — SIGNIFICANT CHANGE UP (ref 135–145)
WBC # BLD: 8.54 K/UL — SIGNIFICANT CHANGE UP (ref 3.8–10.5)
WBC # FLD AUTO: 8.54 K/UL — SIGNIFICANT CHANGE UP (ref 3.8–10.5)

## 2018-11-30 PROCEDURE — 99233 SBSQ HOSP IP/OBS HIGH 50: CPT

## 2018-11-30 RX ORDER — INSULIN GLARGINE 100 [IU]/ML
5 INJECTION, SOLUTION SUBCUTANEOUS AT BEDTIME
Qty: 0 | Refills: 0 | Status: DISCONTINUED | OUTPATIENT
Start: 2018-11-30 | End: 2018-12-07

## 2018-11-30 RX ADMIN — Medication 50 MILLIGRAM(S): at 18:22

## 2018-11-30 RX ADMIN — ISOSORBIDE MONONITRATE 30 MILLIGRAM(S): 60 TABLET, EXTENDED RELEASE ORAL at 15:09

## 2018-11-30 RX ADMIN — PANTOPRAZOLE SODIUM 40 MILLIGRAM(S): 20 TABLET, DELAYED RELEASE ORAL at 06:06

## 2018-11-30 RX ADMIN — Medication 1 TABLET(S): at 08:16

## 2018-11-30 RX ADMIN — GABAPENTIN 300 MILLIGRAM(S): 400 CAPSULE ORAL at 18:22

## 2018-11-30 RX ADMIN — CHLORHEXIDINE GLUCONATE 1 APPLICATION(S): 213 SOLUTION TOPICAL at 14:43

## 2018-11-30 RX ADMIN — TAMSULOSIN HYDROCHLORIDE 0.4 MILLIGRAM(S): 0.4 CAPSULE ORAL at 22:02

## 2018-11-30 RX ADMIN — Medication 1 MILLIGRAM(S): at 15:10

## 2018-11-30 RX ADMIN — ATORVASTATIN CALCIUM 80 MILLIGRAM(S): 80 TABLET, FILM COATED ORAL at 22:01

## 2018-11-30 RX ADMIN — INSULIN GLARGINE 5 UNIT(S): 100 INJECTION, SOLUTION SUBCUTANEOUS at 22:02

## 2018-11-30 RX ADMIN — Medication 1 TABLET(S): at 15:09

## 2018-11-30 RX ADMIN — Medication 2: at 17:18

## 2018-11-30 RX ADMIN — SENNA PLUS 2 TABLET(S): 8.6 TABLET ORAL at 22:01

## 2018-11-30 RX ADMIN — GABAPENTIN 300 MILLIGRAM(S): 400 CAPSULE ORAL at 05:14

## 2018-11-30 RX ADMIN — ERTAPENEM SODIUM 100 MILLIGRAM(S): 1 INJECTION, POWDER, LYOPHILIZED, FOR SOLUTION INTRAMUSCULAR; INTRAVENOUS at 16:28

## 2018-11-30 RX ADMIN — Medication 1 TABLET(S): at 18:22

## 2018-11-30 RX ADMIN — ERYTHROPOIETIN 10000 UNIT(S): 10000 INJECTION, SOLUTION INTRAVENOUS; SUBCUTANEOUS at 09:38

## 2018-11-30 RX ADMIN — Medication 100 MILLIGRAM(S): at 18:22

## 2018-11-30 RX ADMIN — Medication 60 MILLIGRAM(S): at 05:14

## 2018-11-30 RX ADMIN — Medication 81 MILLIGRAM(S): at 15:08

## 2018-11-30 RX ADMIN — Medication 1: at 08:13

## 2018-11-30 RX ADMIN — Medication 100 MILLIGRAM(S): at 05:14

## 2018-11-30 RX ADMIN — Medication 50 MILLIGRAM(S): at 05:14

## 2018-11-30 RX ADMIN — CLOPIDOGREL BISULFATE 75 MILLIGRAM(S): 75 TABLET, FILM COATED ORAL at 15:09

## 2018-11-30 NOTE — PROGRESS NOTE ADULT - SUBJECTIVE AND OBJECTIVE BOX
Patient is a 68y old  Female who presents with a chief complaint of abdominal pain (29 Nov 2018 16:39)      INTERVAL HPI/OVERNIGHT EVENTS: Pt refuses  phone and wishes her son at bedside to translate. Pt reports feeling "good." Denies fever, chills, nausea, vomiting, flank pain. Had aguilar removed at 6AM today and no urine yet.     MEDICATIONS  (STANDING):  aspirin enteric coated 81 milliGRAM(s) Oral daily  atorvastatin 80 milliGRAM(s) Oral at bedtime  chlorhexidine 2% Cloths 1 Application(s) Topical daily  clopidogrel Tablet 75 milliGRAM(s) Oral daily  dextrose 5%. 1000 milliLiter(s) (50 mL/Hr) IV Continuous <Continuous>  dextrose 50% Injectable 12.5 Gram(s) IV Push once  dextrose 50% Injectable 25 Gram(s) IV Push once  dextrose 50% Injectable 25 Gram(s) IV Push once  docusate sodium 100 milliGRAM(s) Oral two times a day  epoetin analilia Injectable 19382 Unit(s) IV Push <User Schedule>  ertapenem  IVPB 500 milliGRAM(s) IV Intermittent every 24 hours  folic acid 1 milliGRAM(s) Oral daily  gabapentin 300 milliGRAM(s) Oral two times a day  glycerin Suppository - Adult 1 Suppository(s) Rectal daily  heparin  Injectable 5000 Unit(s) SubCutaneous every 12 hours  insulin lispro (HumaLOG) corrective regimen sliding scale   SubCutaneous three times a day before meals  insulin lispro (HumaLOG) corrective regimen sliding scale   SubCutaneous at bedtime  isosorbide   mononitrate ER Tablet (IMDUR) 30 milliGRAM(s) Oral daily  lactobacillus acidophilus 1 Tablet(s) Oral three times a day with meals  metoprolol tartrate 50 milliGRAM(s) Oral two times a day  NIFEdipine XL 60 milliGRAM(s) Oral daily  pantoprazole    Tablet 40 milliGRAM(s) Oral before breakfast  senna 2 Tablet(s) Oral at bedtime  tamsulosin 0.4 milliGRAM(s) Oral at bedtime    MEDICATIONS  (PRN):  dextrose 40% Gel 15 Gram(s) Oral once PRN Blood Glucose LESS THAN 70 milliGRAM(s)/deciliter  glucagon  Injectable 1 milliGRAM(s) IntraMuscular once PRN Glucose LESS THAN 70 milligrams/deciliter  ondansetron Injectable 4 milliGRAM(s) IV Push every 6 hours PRN Nausea  simethicone 80 milliGRAM(s) Chew three times a day PRN Gas      Allergies    No Known Drug Allergies  Purell (Rash)    Intolerances        REVIEW OF SYSTEMS:  CONSTITUTIONAL: No fever or chills  HEENT:  No headache, no sore throat  RESPIRATORY: No cough, wheezing, or shortness of breath  CARDIOVASCULAR: No chest pain, palpitations, or leg swelling  GASTROINTESTINAL: abd pain resolved, No nausea, vomiting, or diarrhea  GENITOURINARY: No dysuria, hematuria or flank pain  NEUROLOGICAL: no focal weakness or dizziness  MUSCULOSKELETAL: no myalgias    SKIN: rash resolved    Vital Signs Last 24 Hrs  T(C): 36.6 (11-30-18 @ 09:25), Max: 36.9 (11-29-18 @ 21:03)  HR: 73 (11-30-18 @ 09:25) (69 - 79)  BP: 141/65 (11-30-18 @ 09:25) (135/71 - 151/78)  RR: 19 (11-30-18 @ 09:25) (16 - 22)  SpO2: 98% (11-30-18 @ 09:25) (93% - 98%)    PHYSICAL EXAM:  GENERAL: NAD, obese  HEENT:  anicteric, moist mucous membranes  CHEST/LUNG:  CTA b/l, no rales, wheezes, or rhonchi ; R chest permacath  HEART:  RRR, S1, S2  ABDOMEN:  BS+, soft, nontender, nondistended  BACK: no CVA tenderness; rash improved  EXTREMITIES: no edema, cyanosis, or calf tenderness  NERVOUS SYSTEM: answering questions and following commands    LABS:                                   8.0    8.54  )-----------( 336      ( 30 Nov 2018 08:18 )             27.1     CBC Full  -  ( 30 Nov 2018 08:18 )  WBC Count : 8.54 K/uL  Hemoglobin : 8.0 g/dL  Hematocrit : 27.1 %  Platelet Count - Automated : 336 K/uL  Mean Cell Volume : 90.0 fl  Mean Cell Hemoglobin : 26.6 pg  Mean Cell Hemoglobin Concentration : 29.5 gm/dL  Auto Neutrophil # : x  Auto Lymphocyte # : x  Auto Monocyte # : x  Auto Eosinophil # : x  Auto Basophil # : x  Auto Neutrophil % : x  Auto Lymphocyte % : x  Auto Monocyte % : x  Auto Eosinophil % : x  Auto Basophil % : x    11-30    139  |  103  |  24<H>  ----------------------------<  150<H>  3.8   |  31  |  5.80<H>    Ca    8.3<L>      30 Nov 2018 08:18  Phos  3.6     11-30  Mg     1.9     11-30               CAPILLARY BLOOD GLUCOSE      POCT Blood Glucose.: 143 mg/dL (30 Nov 2018 11:52)  POCT Blood Glucose.: 174 mg/dL (30 Nov 2018 07:26)      Culture - Blood (collected 11-27-18 @ 00:16)  Source: .Blood Blood  Preliminary Report (11-28-18 @ 01:05):    No growth to date.    Culture - Blood (collected 11-27-18 @ 00:16)  Source: .Blood Blood  Preliminary Report (11-28-18 @ 01:05):    No growth to date.        RADIOLOGY & ADDITIONAL TESTS:    Personally reviewed.     Consultant(s) Notes Reviewed:  [x] YES  [ ] NO Patient is a 68y old  Female who presents with a chief complaint of abdominal pain (29 Nov 2018 16:39)    INTERVAL HPI/OVERNIGHT EVENTS: Pt refuses  phone and wishes her son at bedside to translate. Pt reports feeling "good." Denies fever, chills, nausea, vomiting, flank pain. Had aguilar removed at 6AM today and no urine yet.     MEDICATIONS  (STANDING):  aspirin enteric coated 81 milliGRAM(s) Oral daily  atorvastatin 80 milliGRAM(s) Oral at bedtime  chlorhexidine 2% Cloths 1 Application(s) Topical daily  clopidogrel Tablet 75 milliGRAM(s) Oral daily  dextrose 5%. 1000 milliLiter(s) (50 mL/Hr) IV Continuous <Continuous>  dextrose 50% Injectable 12.5 Gram(s) IV Push once  dextrose 50% Injectable 25 Gram(s) IV Push once  dextrose 50% Injectable 25 Gram(s) IV Push once  docusate sodium 100 milliGRAM(s) Oral two times a day  epoetin analilia Injectable 47330 Unit(s) IV Push <User Schedule>  ertapenem  IVPB 500 milliGRAM(s) IV Intermittent every 24 hours  folic acid 1 milliGRAM(s) Oral daily  gabapentin 300 milliGRAM(s) Oral two times a day  glycerin Suppository - Adult 1 Suppository(s) Rectal daily  heparin  Injectable 5000 Unit(s) SubCutaneous every 12 hours  insulin lispro (HumaLOG) corrective regimen sliding scale   SubCutaneous three times a day before meals  insulin lispro (HumaLOG) corrective regimen sliding scale   SubCutaneous at bedtime  isosorbide   mononitrate ER Tablet (IMDUR) 30 milliGRAM(s) Oral daily  lactobacillus acidophilus 1 Tablet(s) Oral three times a day with meals  metoprolol tartrate 50 milliGRAM(s) Oral two times a day  NIFEdipine XL 60 milliGRAM(s) Oral daily  pantoprazole    Tablet 40 milliGRAM(s) Oral before breakfast  senna 2 Tablet(s) Oral at bedtime  tamsulosin 0.4 milliGRAM(s) Oral at bedtime    MEDICATIONS  (PRN):  dextrose 40% Gel 15 Gram(s) Oral once PRN Blood Glucose LESS THAN 70 milliGRAM(s)/deciliter  glucagon  Injectable 1 milliGRAM(s) IntraMuscular once PRN Glucose LESS THAN 70 milligrams/deciliter  ondansetron Injectable 4 milliGRAM(s) IV Push every 6 hours PRN Nausea  simethicone 80 milliGRAM(s) Chew three times a day PRN Gas      Allergies    No Known Drug Allergies  Purell (Rash)    Intolerances        REVIEW OF SYSTEMS:  CONSTITUTIONAL: No fever or chills  HEENT:  No headache, no sore throat  RESPIRATORY: No cough, wheezing, or shortness of breath  CARDIOVASCULAR: No chest pain, palpitations, or leg swelling  GASTROINTESTINAL: abd pain resolved, No nausea, vomiting, or diarrhea  GENITOURINARY: No dysuria, hematuria or flank pain  NEUROLOGICAL: no focal weakness or dizziness  MUSCULOSKELETAL: no myalgias    SKIN: rash resolved    Vital Signs Last 24 Hrs  T(C): 36.6 (11-30-18 @ 09:25), Max: 36.9 (11-29-18 @ 21:03)  HR: 73 (11-30-18 @ 09:25) (69 - 79)  BP: 141/65 (11-30-18 @ 09:25) (135/71 - 151/78)  RR: 19 (11-30-18 @ 09:25) (16 - 22)  SpO2: 98% (11-30-18 @ 09:25) (93% - 98%)    PHYSICAL EXAM:  GENERAL: NAD, obese  HEENT:  anicteric, moist mucous membranes  CHEST/LUNG:  CTA b/l, no rales, wheezes, or rhonchi ; R chest permacath  HEART:  RRR, S1, S2  ABDOMEN:  BS+, soft, nontender, nondistended  BACK: no CVA tenderness; rash improved  EXTREMITIES: no edema, cyanosis, or calf tenderness  NERVOUS SYSTEM: answering questions and following commands    LABS:                                   8.0    8.54  )-----------( 336      ( 30 Nov 2018 08:18 )             27.1     CBC Full  -  ( 30 Nov 2018 08:18 )  WBC Count : 8.54 K/uL  Hemoglobin : 8.0 g/dL  Hematocrit : 27.1 %  Platelet Count - Automated : 336 K/uL  Mean Cell Volume : 90.0 fl  Mean Cell Hemoglobin : 26.6 pg  Mean Cell Hemoglobin Concentration : 29.5 gm/dL  Auto Neutrophil # : x  Auto Lymphocyte # : x  Auto Monocyte # : x  Auto Eosinophil # : x  Auto Basophil # : x  Auto Neutrophil % : x  Auto Lymphocyte % : x  Auto Monocyte % : x  Auto Eosinophil % : x  Auto Basophil % : x    11-30    139  |  103  |  24<H>  ----------------------------<  150<H>  3.8   |  31  |  5.80<H>    Ca    8.3<L>      30 Nov 2018 08:18  Phos  3.6     11-30  Mg     1.9     11-30               CAPILLARY BLOOD GLUCOSE      POCT Blood Glucose.: 143 mg/dL (30 Nov 2018 11:52)  POCT Blood Glucose.: 174 mg/dL (30 Nov 2018 07:26)      Culture - Blood (collected 11-27-18 @ 00:16)  Source: .Blood Blood  Preliminary Report (11-28-18 @ 01:05):    No growth to date.    Culture - Blood (collected 11-27-18 @ 00:16)  Source: .Blood Blood  Preliminary Report (11-28-18 @ 01:05):    No growth to date.        RADIOLOGY & ADDITIONAL TESTS:    Personally reviewed.     Consultant(s) Notes Reviewed:  [x] YES  [ ] NO

## 2018-11-30 NOTE — DISCHARGE NOTE ADULT - HOSPITAL COURSE
67yo Roberto speaking obese F with PMH of CAD s/p stent (2007), ESRD on HD (MWF), DM type 2, HTN, recent admission for severe sepsis with ESBL E coli bacteremia due to ascending cholangitis with CBD stone, s/p stone extraction and stent placement in [place], now presented to [place] ER with lower abd pain. Pt had CT Abd/P which was suspicious for new liver abscesses and GI ([facility]) and ID ([facility]) were consulted. Of note, pt also complained of inability to urinate (while she was urinating prior) and found to have >600cc retained urine in the bladder. Once bladder was emptied, pt's lower abd pain resolved so acute urinary retention was the likely etiology of the abd pain pt presented to the hospital with. Urology ([facility]) consulted on the case, started pt on flomax and pt was to have TOV prior to discharge. Bowel regimen given in case constipation contributed to urinary retention.   For the liver abscesses, pt treated with Invanz to cover the ESBL E coli that had just grown in the biliary fluid and blood on recent admission for cholangitis. MRCP confirmed two likely small abscesses in the liver. Given the size and location, those abscesses were deemed too difficult to drain with IR and ID recommended 6 weeks of Invanz. PICC line placed in left IJ (arms protected for eventual AVF placement). Pt's BCx were negative and pt did not have signs of systemic infection. Phys therapy evaluated and rec CURRY. 69yo Roberto speaking obese F with PMH of CAD s/p stent (2007), ESRD on HD (MWF), DM type 2, HTN, recent admission for severe sepsis with ESBL E coli bacteremia due to ascending cholangitis with CBD stone, s/p stone extraction and stent placement in [facility], now presented to [place] ER with lower abd pain. Pt had CT Abd/P which was suspicious for new liver abscesses and GI ([facility]) and ID ([facility]) were consulted. Of note, pt also complained of inability to urinate (while she was urinating prior) and found to have >600cc retained urine in the bladder. Once bladder was emptied, pt's lower abd pain resolved so acute urinary retention was the likely etiology of the abd pain pt presented to the hospital with. Urology ([facility]) consulted on the case, started pt on flomax and pt was to have TOV prior to discharge. Bowel regimen given in case constipation contributed to urinary retention.   For the liver abscesses, pt treated with Invanz to cover the ESBL E coli that had just grown in the biliary fluid and blood on recent admission for cholangitis. MRCP confirmed two likely small abscesses in the liver. Given the size and location, those abscesses were deemed too difficult to drain with IR and ID recommended 6 weeks of Invanz. PICC line placed in left IJ (arms protected for eventual AVF placement).  pt developed allergic rxn to invanx with uratica, changed to tygclycine to complete on 1/6/18Pt's BCx were negative and pt did not have signs of systemic infection. Phys therapy evaluated and rec CURRY.     PE  nad  chest s1m s2  lungs decrease air entry at the bases  adb:SB+

## 2018-11-30 NOTE — PROGRESS NOTE ADULT - SUBJECTIVE AND OBJECTIVE BOX
pt seen  no complaints  ICU Vital Signs Last 24 Hrs  T(C): 36.6 (30 Nov 2018 05:16), Max: 36.9 (29 Nov 2018 21:03)  T(F): 97.9 (30 Nov 2018 05:16), Max: 98.4 (29 Nov 2018 21:03)  HR: 76 (30 Nov 2018 05:16) (69 - 79)  BP: 135/71 (30 Nov 2018 05:16) (135/71 - 151/78)  BP(mean): --  ABP: --  ABP(mean): --  RR: 20 (30 Nov 2018 05:16) (16 - 22)  SpO2: 93% (30 Nov 2018 05:16) (93% - 97%)  gen-NAD  resp-clear  abd-soft NT/ND                          8.0    8.54  )-----------( 336      ( 30 Nov 2018 08:18 )             27.1   11-30    139  |  103  |  24<H>  ----------------------------<  150<H>  3.8   |  31  |  5.80<H>    Ca    8.3<L>      30 Nov 2018 08:18  Phos  3.6     11-30  Mg     1.9     11-30

## 2018-11-30 NOTE — DISCHARGE NOTE ADULT - PLAN OF CARE
complete iv tygcycline until 1/6 would need repeat imagings after completion of iv abx resolving on hd lantus/fs/diabetic diet resume home meds resume asa/plavix erythropeitne injection

## 2018-11-30 NOTE — PROGRESS NOTE ADULT - PROBLEM SELECTOR PLAN 3
likely multifactorial, acute illness, chronic dz, esrd  around baseline from previous admission  hgb low but stable  no overt s/s gib  trend h/h, transfuse prn  cont ppi  on epo  iron studies cw aocd  hold anti plts in setting of active gib  will follow

## 2018-11-30 NOTE — PROGRESS NOTE ADULT - SUBJECTIVE AND OBJECTIVE BOX
CLAUDIAIRENE  68y  Female    Patient is a 68y old  Female who presents with a chief complaint of abdominal pain (30 Nov 2018 10:40)      HPI:  this si a 70 y/o f with pmhx of htn, hld, obesity, HD dependant, DM 2 on basilglar, s/p cholecystotomy tube removal with biliary stent placement at [facility] p/w abdominal pain to ER. She developed abd pain while having HD and they had to stop that and send her to ED. Pt narrates that her pain is 4/10 in lower abdomen and non radiating. she was found ot have multiple liver lesion concening for liver abscesses and lipas eof 489 concerning for acute pancreatitis, GI was called for evaluation. pt ha sno wbc elevationan dno fever  acc by daughter in law (26 Nov 2018 20:39)    comfortable.       PAST MEDICAL & SURGICAL HISTORY:  ESRD (end stage renal disease) on dialysis: MWF  CAD (coronary artery disease): s/p stent in 2007  Diabetes  Myocardial infarction  Hypertension  H/O: hysterectomy          PHYSICAL EXAM:    T(C): 36.6 (11-30-18 @ 09:25), Max: 36.9 (11-29-18 @ 21:03)  HR: 73 (11-30-18 @ 09:25) (69 - 79)  BP: 141/65 (11-30-18 @ 09:25) (135/71 - 151/78)  RR: 19 (11-30-18 @ 09:25) (16 - 22)  SpO2: 98% (11-30-18 @ 09:25) (93% - 98%)  Wt(kg): --    I&O's Detail    29 Nov 2018 07:01  -  30 Nov 2018 07:00  --------------------------------------------------------  IN:  Total IN: 0 mL    OUT:    Indwelling Catheter - Urethral: 800 mL  Total OUT: 800 mL    Total NET: -800 mL    Respiratory: clear anteriorly, decreased BS at bases  Cardiovascular: S1 S2  Gastrointestinal: soft NT ND +BS  Extremities: trace edema   Neuro: Awake and alert    MEDICATIONS  (STANDING):  aspirin enteric coated 81 milliGRAM(s) Oral daily  atorvastatin 80 milliGRAM(s) Oral at bedtime  chlorhexidine 2% Cloths 1 Application(s) Topical daily  clopidogrel Tablet 75 milliGRAM(s) Oral daily  dextrose 5%. 1000 milliLiter(s) (50 mL/Hr) IV Continuous <Continuous>  dextrose 50% Injectable 12.5 Gram(s) IV Push once  dextrose 50% Injectable 25 Gram(s) IV Push once  dextrose 50% Injectable 25 Gram(s) IV Push once  docusate sodium 100 milliGRAM(s) Oral two times a day  epoetin analilia Injectable 03684 Unit(s) IV Push <User Schedule>  ertapenem  IVPB 500 milliGRAM(s) IV Intermittent every 24 hours  folic acid 1 milliGRAM(s) Oral daily  gabapentin 300 milliGRAM(s) Oral two times a day  glycerin Suppository - Adult 1 Suppository(s) Rectal daily  insulin lispro (HumaLOG) corrective regimen sliding scale   SubCutaneous three times a day before meals  insulin lispro (HumaLOG) corrective regimen sliding scale   SubCutaneous at bedtime  isosorbide   mononitrate ER Tablet (IMDUR) 30 milliGRAM(s) Oral daily  lactobacillus acidophilus 1 Tablet(s) Oral three times a day with meals  metoprolol tartrate 50 milliGRAM(s) Oral two times a day  NIFEdipine XL 60 milliGRAM(s) Oral daily  pantoprazole    Tablet 40 milliGRAM(s) Oral before breakfast  senna 2 Tablet(s) Oral at bedtime  tamsulosin 0.4 milliGRAM(s) Oral at bedtime    MEDICATIONS  (PRN):  dextrose 40% Gel 15 Gram(s) Oral once PRN Blood Glucose LESS THAN 70 milliGRAM(s)/deciliter  glucagon  Injectable 1 milliGRAM(s) IntraMuscular once PRN Glucose LESS THAN 70 milligrams/deciliter  ondansetron Injectable 4 milliGRAM(s) IV Push every 6 hours PRN Nausea  simethicone 80 milliGRAM(s) Chew three times a day PRN Gas                            8.0    8.54  )-----------( 336      ( 30 Nov 2018 08:18 )             27.1       11-30    139  |  103  |  24<H>  ----------------------------<  150<H>  3.8   |  31  |  5.80<H>    Ca    8.3<L>      30 Nov 2018 08:18  Phos  3.6     11-30  Mg     1.9     11-30

## 2018-11-30 NOTE — DISCHARGE NOTE ADULT - SECONDARY DIAGNOSIS.
Acute urinary retention ESRD (end stage renal disease) on dialysis Type 2 diabetes mellitus Hypertension CAD (coronary artery disease) Anemia

## 2018-11-30 NOTE — PROGRESS NOTE ADULT - PROBLEM SELECTOR PLAN 3
rash was localized to the back where gown is open. That localization is suggestive of a possible contact dermatitis.   -hydrocortisone cream placed with resolution of the rash

## 2018-11-30 NOTE — DISCHARGE NOTE ADULT - CARE PLAN
Principal Discharge DX:	Liver abscess  Secondary Diagnosis:	Acute urinary retention  Secondary Diagnosis:	ESRD (end stage renal disease) on dialysis  Secondary Diagnosis:	Type 2 diabetes mellitus  Secondary Diagnosis:	Hypertension  Secondary Diagnosis:	CAD (coronary artery disease)  Secondary Diagnosis:	Anemia Principal Discharge DX:	Liver abscess  Goal:	complete iv tygcycline until 1/6  Assessment and plan of treatment:	would need repeat imagings after completion of iv abx  Secondary Diagnosis:	Acute urinary retention  Goal:	resolving  Secondary Diagnosis:	ESRD (end stage renal disease) on dialysis  Goal:	on hd  Secondary Diagnosis:	Type 2 diabetes mellitus  Goal:	lantus/fs/diabetic diet  Secondary Diagnosis:	Hypertension  Goal:	resume home meds  Secondary Diagnosis:	CAD (coronary artery disease)  Goal:	resume asa/plavix  Secondary Diagnosis:	Anemia  Goal:	erythropeitne injection

## 2018-11-30 NOTE — DISCHARGE NOTE ADULT - PATIENT PORTAL LINK FT
You can access the [facility] Portal, offered by [facility], by registering with the following website: [email or web address]

## 2018-11-30 NOTE — DISCHARGE NOTE ADULT - MEDICATION SUMMARY - MEDICATIONS TO TAKE
I will START or STAY ON the medications listed below when I get home from the hospital:    Aspirin Enteric Coated 81 mg oral delayed release tablet  -- 1 tab(s) by mouth once a day  -- Indication: For CAD (coronary artery disease)    isosorbide mononitrate 30 mg oral tablet, extended release  -- 2 tab(s) by mouth once a day (in the morning)  1 tabs by mouth once a day (in the evening)  -- Indication: For CAD (coronary artery disease)    gabapentin 300 mg oral capsule  -- 1 cap(s) by mouth 2 times a day  -- Indication: For Neuropathy    Basaglar KwikPen 100 units/mL subcutaneous solution  -- 7 unit(s) subcutaneous once a day (at bedtime)   -- Do not drink alcoholic beverages when taking this medication.  It is very important that you take or use this exactly as directed.  Do not skip doses or discontinue unless directed by your doctor.  Keep in refrigerator.  Do not freeze.    -- Indication: For dm    atorvastatin 80 mg oral tablet  -- 1 tab(s) by mouth once a day (at bedtime)  -- Indication: For Hld    clopidogrel 75 mg oral tablet  -- 1 tab(s) by mouth once a day  -- Indication: For CAD (coronary artery disease)    metoprolol tartrate 50 mg oral tablet  -- 1 tab(s) by mouth 2 times a day  -- Indication: For CAD (coronary artery disease)    NIFEdipine 60 mg oral tablet, extended release  -- 1 tab(s) by mouth once a day   -- Indication: For Htn    hydrocortisone 1% topical cream  -- 1 application on skin 2 times a day, As needed, Rash and/or Itching  -- Indication: For Rash    epoetin analilia  -- 82459 unit(s) intravenous 3 times a day T/T/S  intra-dialysis   -- Indication: For Anemia    tigecycline 50 mg intravenous injection  -- 50 milligram(s) intravenously every 12 hours; stop date 1/6/19  -- Indication: For infection    famotidine 20 mg oral tablet  -- 1 tab(s) by mouth once  -- Indication: For gerd    senna oral tablet  -- 2 tab(s) by mouth once a day (at bedtime)  -- Indication: For Consitpation    Colace 100 mg oral capsule  -- 1 cap(s) by mouth once a day   -- Medication should be taken with plenty of water.    -- Indication: For Constipaton I will START or STAY ON the medications listed below when I get home from the hospital:    Aspirin Enteric Coated 81 mg oral delayed release tablet  -- 1 tab(s) by mouth once a day  -- Indication: For CAD (coronary artery disease)    isosorbide mononitrate 30 mg oral tablet, extended release  -- 2 tab(s) by mouth once a day (in the morning)  1 tabs by mouth once a day (in the evening)  -- Indication: For CAD (coronary artery disease)    gabapentin 300 mg oral capsule  -- 1 cap(s) by mouth 2 times a day  -- Indication: For Neuropathy    Basaglar KwikPen 100 units/mL subcutaneous solution  -- 7 unit(s) subcutaneous once a day (at bedtime)   -- Do not drink alcoholic beverages when taking this medication.  It is very important that you take or use this exactly as directed.  Do not skip doses or discontinue unless directed by your doctor.  Keep in refrigerator.  Do not freeze.    -- Indication: For dm    atorvastatin 80 mg oral tablet  -- 1 tab(s) by mouth once a day (at bedtime)  -- Indication: For Hld    clopidogrel 75 mg oral tablet  -- 1 tab(s) by mouth once a day  -- Indication: For CAD (coronary artery disease)    metoprolol tartrate 50 mg oral tablet  -- 1 tab(s) by mouth 2 times a day  -- Indication: For CAD (coronary artery disease)    NIFEdipine 60 mg oral tablet, extended release  -- 1 tab(s) by mouth once a day   -- Indication: For Htn    hydrocortisone 1% topical cream  -- 1 application on skin 2 times a day, As needed, Rash and/or Itching  -- Indication: For Rash    epoetin analilia  -- 73009 unit(s) intravenous 3 times a day T/T/S  intra-dialysis   -- Indication: For Anemia    tigecycline 50 mg intravenous injection  -- 50 milligram(s) intravenously every 12 hours; stop date 1/6/19  -- Indication: For infection    famotidine 20 mg oral tablet  -- 1 tab(s) by mouth once  -- Indication: For gerd    senna oral tablet  -- 2 tab(s) by mouth once a day (at bedtime)  -- Indication: For Consitpation    Colace 100 mg oral capsule  -- 1 cap(s) by mouth once a day   -- Medication should be taken with plenty of water.    -- Indication: For Constipaton

## 2018-11-30 NOTE — PROGRESS NOTE ADULT - SUBJECTIVE AND OBJECTIVE BOX
INTERVAL HPI/OVERNIGHT EVENTS:  pt seen and examined  denies abd pain  per overnight rn no acute gi issues no s/s overt gib  sp picc line  afebrile overnight labs noted    MEDICATIONS  (STANDING):  aspirin enteric coated 81 milliGRAM(s) Oral daily  atorvastatin 80 milliGRAM(s) Oral at bedtime  chlorhexidine 2% Cloths 1 Application(s) Topical daily  clopidogrel Tablet 75 milliGRAM(s) Oral daily  dextrose 5%. 1000 milliLiter(s) (50 mL/Hr) IV Continuous <Continuous>  dextrose 50% Injectable 12.5 Gram(s) IV Push once  dextrose 50% Injectable 25 Gram(s) IV Push once  dextrose 50% Injectable 25 Gram(s) IV Push once  docusate sodium 100 milliGRAM(s) Oral two times a day  epoetin analilia Injectable 81715 Unit(s) IV Push <User Schedule>  ertapenem  IVPB 500 milliGRAM(s) IV Intermittent every 24 hours  folic acid 1 milliGRAM(s) Oral daily  gabapentin 300 milliGRAM(s) Oral two times a day  glycerin Suppository - Adult 1 Suppository(s) Rectal daily  insulin lispro (HumaLOG) corrective regimen sliding scale   SubCutaneous three times a day before meals  insulin lispro (HumaLOG) corrective regimen sliding scale   SubCutaneous at bedtime  isosorbide   mononitrate ER Tablet (IMDUR) 30 milliGRAM(s) Oral daily  lactobacillus acidophilus 1 Tablet(s) Oral three times a day with meals  metoprolol tartrate 50 milliGRAM(s) Oral two times a day  NIFEdipine XL 60 milliGRAM(s) Oral daily  pantoprazole    Tablet 40 milliGRAM(s) Oral before breakfast  senna 2 Tablet(s) Oral at bedtime  tamsulosin 0.4 milliGRAM(s) Oral at bedtime    MEDICATIONS  (PRN):  dextrose 40% Gel 15 Gram(s) Oral once PRN Blood Glucose LESS THAN 70 milliGRAM(s)/deciliter  glucagon  Injectable 1 milliGRAM(s) IntraMuscular once PRN Glucose LESS THAN 70 milligrams/deciliter  ondansetron Injectable 4 milliGRAM(s) IV Push every 6 hours PRN Nausea  simethicone 80 milliGRAM(s) Chew three times a day PRN Gas      Allergies    No Known Drug Allergies  Purell (Rash)    Intolerances        Review of Systems:    General:  No wt loss, fevers, chills, night sweats, fatigue   Eyes:  Good vision, no reported pain  ENT:  No sore throat, pain, runny nose, dysphagia  CV:  No pain, palpitations, hypo/hypertension  Resp:  No dyspnea, cough, tachypnea, wheezing  GI:  No pain, No nausea, No vomiting, No diarrhea, No constipation, No weight loss, No fever, No pruritis, No rectal bleeding, No melena, No dysphagia  :  No pain, bleeding, incontinence, nocturia  Muscle:  No pain, weakness  Neuro:  No weakness, tingling, memory problems  Psych:  No fatigue, insomnia, mood problems, depression  Endocrine:  No polyuria, polydypsia, cold/heat intolerance  Heme:  No petechiae, ecchymosis, easy bruisability  Skin:  No rash, tattoos, scars, edema      Vital Signs Last 24 Hrs  T(C): 36.6 (30 Nov 2018 05:16), Max: 36.9 (29 Nov 2018 21:03)  T(F): 97.9 (30 Nov 2018 05:16), Max: 98.4 (29 Nov 2018 21:03)  HR: 76 (30 Nov 2018 05:16) (69 - 79)  BP: 135/71 (30 Nov 2018 05:16) (135/71 - 151/78)  BP(mean): --  RR: 20 (30 Nov 2018 05:16) (16 - 22)  SpO2: 93% (30 Nov 2018 05:16) (93% - 97%)    PHYSICAL EXAM:  General:  lying in bed in nad  HEENT:  NC/AT  Chest: dec bs  Cardiovascular:  Regular rhythm, S1, S2  Abdomen: obese abd soft nt mild dt+ ecchymosis  Extremities:  no edema  Skin:  scattered ecchymosis  Neuro/Psych:  Awake alert responds appropriately    LABS:                        8.0    8.54  )-----------( 336      ( 30 Nov 2018 08:18 )             27.1     11-30    139  |  103  |  24<H>  ----------------------------<  150<H>  3.8   |  31  |  5.80<H>    Ca    8.3<L>      30 Nov 2018 08:18  Mg     1.9     11-30    TPro  7.0  /  Alb  1.7<L>  /  TBili  0.5  /  DBili  .30<H>  /  AST  23  /  ALT  13  /  AlkPhos  219<H>  11-28    PT/INR - ( 28 Nov 2018 08:54 )   PT: 14.0 sec;   INR: 1.22 ratio               RADIOLOGY & ADDITIONAL TESTS:

## 2018-11-30 NOTE — PROGRESS NOTE ADULT - PROBLEM SELECTOR PLAN 8
-likely anemia of chronic disease and due to ESRD  -procrit as per nephrology  -H&H near baseline, no bleeding on ROS  -started folate supplement for borderline low folate

## 2018-12-01 ENCOUNTER — TRANSCRIPTION ENCOUNTER (OUTPATIENT)
Age: 68
End: 2018-12-01

## 2018-12-01 LAB
ALBUMIN SERPL ELPH-MCNC: 1.7 G/DL — LOW (ref 3.3–5)
ALP SERPL-CCNC: 207 U/L — HIGH (ref 40–120)
ALT FLD-CCNC: 11 U/L — LOW (ref 12–78)
ANION GAP SERPL CALC-SCNC: 7 MMOL/L — SIGNIFICANT CHANGE UP (ref 5–17)
AST SERPL-CCNC: 21 U/L — SIGNIFICANT CHANGE UP (ref 15–37)
BILIRUB DIRECT SERPL-MCNC: 0.2 MG/DL — SIGNIFICANT CHANGE UP (ref 0.05–0.2)
BILIRUB INDIRECT FLD-MCNC: 0.2 MG/DL — SIGNIFICANT CHANGE UP (ref 0.2–1)
BILIRUB SERPL-MCNC: 0.4 MG/DL — SIGNIFICANT CHANGE UP (ref 0.2–1.2)
BUN SERPL-MCNC: 13 MG/DL — SIGNIFICANT CHANGE UP (ref 7–23)
CALCIUM SERPL-MCNC: 8.1 MG/DL — LOW (ref 8.5–10.1)
CHLORIDE SERPL-SCNC: 99 MMOL/L — SIGNIFICANT CHANGE UP (ref 96–108)
CO2 SERPL-SCNC: 31 MMOL/L — SIGNIFICANT CHANGE UP (ref 22–31)
CREAT SERPL-MCNC: 4.1 MG/DL — HIGH (ref 0.5–1.3)
GLUCOSE SERPL-MCNC: 172 MG/DL — HIGH (ref 70–99)
HCT VFR BLD CALC: 28 % — LOW (ref 34.5–45)
HGB BLD-MCNC: 8.2 G/DL — LOW (ref 11.5–15.5)
MAGNESIUM SERPL-MCNC: 1.7 MG/DL — SIGNIFICANT CHANGE UP (ref 1.6–2.6)
MCHC RBC-ENTMCNC: 26.5 PG — LOW (ref 27–34)
MCHC RBC-ENTMCNC: 29.3 GM/DL — LOW (ref 32–36)
MCV RBC AUTO: 90.3 FL — SIGNIFICANT CHANGE UP (ref 80–100)
NRBC # BLD: 0 /100 WBCS — SIGNIFICANT CHANGE UP (ref 0–0)
PHOSPHATE SERPL-MCNC: 2.4 MG/DL — LOW (ref 2.5–4.5)
PLATELET # BLD AUTO: 329 K/UL — SIGNIFICANT CHANGE UP (ref 150–400)
POTASSIUM SERPL-MCNC: 3.7 MMOL/L — SIGNIFICANT CHANGE UP (ref 3.5–5.3)
POTASSIUM SERPL-SCNC: 3.7 MMOL/L — SIGNIFICANT CHANGE UP (ref 3.5–5.3)
PROT SERPL-MCNC: 6.9 G/DL — SIGNIFICANT CHANGE UP (ref 6–8.3)
RBC # BLD: 3.1 M/UL — LOW (ref 3.8–5.2)
RBC # FLD: 16.6 % — HIGH (ref 10.3–14.5)
SODIUM SERPL-SCNC: 137 MMOL/L — SIGNIFICANT CHANGE UP (ref 135–145)
WBC # BLD: 9.69 K/UL — SIGNIFICANT CHANGE UP (ref 3.8–10.5)
WBC # FLD AUTO: 9.69 K/UL — SIGNIFICANT CHANGE UP (ref 3.8–10.5)

## 2018-12-01 PROCEDURE — 99233 SBSQ HOSP IP/OBS HIGH 50: CPT

## 2018-12-01 RX ORDER — DIPHENHYDRAMINE HCL 50 MG
25 CAPSULE ORAL EVERY 4 HOURS
Qty: 0 | Refills: 0 | Status: DISCONTINUED | OUTPATIENT
Start: 2018-12-01 | End: 2018-12-01

## 2018-12-01 RX ORDER — HYDROCORTISONE 1 %
1 OINTMENT (GRAM) TOPICAL
Qty: 0 | Refills: 0 | Status: DISCONTINUED | OUTPATIENT
Start: 2018-12-01 | End: 2018-12-07

## 2018-12-01 RX ORDER — FAMOTIDINE 10 MG/ML
20 INJECTION INTRAVENOUS ONCE
Qty: 0 | Refills: 0 | Status: DISCONTINUED | OUTPATIENT
Start: 2018-12-01 | End: 2018-12-07

## 2018-12-01 RX ADMIN — ATORVASTATIN CALCIUM 80 MILLIGRAM(S): 80 TABLET, FILM COATED ORAL at 21:48

## 2018-12-01 RX ADMIN — Medication 1 TABLET(S): at 08:08

## 2018-12-01 RX ADMIN — Medication 1 APPLICATION(S): at 17:21

## 2018-12-01 RX ADMIN — GABAPENTIN 300 MILLIGRAM(S): 400 CAPSULE ORAL at 05:58

## 2018-12-01 RX ADMIN — Medication 1 TABLET(S): at 12:47

## 2018-12-01 RX ADMIN — CHLORHEXIDINE GLUCONATE 1 APPLICATION(S): 213 SOLUTION TOPICAL at 12:47

## 2018-12-01 RX ADMIN — ISOSORBIDE MONONITRATE 30 MILLIGRAM(S): 60 TABLET, EXTENDED RELEASE ORAL at 12:47

## 2018-12-01 RX ADMIN — Medication 60 MILLIGRAM(S): at 05:58

## 2018-12-01 RX ADMIN — Medication 100 MILLIGRAM(S): at 05:58

## 2018-12-01 RX ADMIN — PANTOPRAZOLE SODIUM 40 MILLIGRAM(S): 20 TABLET, DELAYED RELEASE ORAL at 05:58

## 2018-12-01 RX ADMIN — Medication 50 MILLIGRAM(S): at 05:58

## 2018-12-01 RX ADMIN — Medication 50 MILLIGRAM(S): at 17:21

## 2018-12-01 RX ADMIN — Medication 1 TABLET(S): at 17:21

## 2018-12-01 RX ADMIN — Medication 1 MILLIGRAM(S): at 12:47

## 2018-12-01 RX ADMIN — GABAPENTIN 300 MILLIGRAM(S): 400 CAPSULE ORAL at 17:21

## 2018-12-01 RX ADMIN — TAMSULOSIN HYDROCHLORIDE 0.4 MILLIGRAM(S): 0.4 CAPSULE ORAL at 21:48

## 2018-12-01 RX ADMIN — Medication 2: at 08:05

## 2018-12-01 RX ADMIN — Medication 81 MILLIGRAM(S): at 12:47

## 2018-12-01 RX ADMIN — INSULIN GLARGINE 5 UNIT(S): 100 INJECTION, SOLUTION SUBCUTANEOUS at 21:48

## 2018-12-01 RX ADMIN — SENNA PLUS 2 TABLET(S): 8.6 TABLET ORAL at 21:48

## 2018-12-01 RX ADMIN — CLOPIDOGREL BISULFATE 75 MILLIGRAM(S): 75 TABLET, FILM COATED ORAL at 12:47

## 2018-12-01 RX ADMIN — Medication 3: at 17:07

## 2018-12-01 NOTE — PROGRESS NOTE ADULT - SUBJECTIVE AND OBJECTIVE BOX
Subjective: Pt c/o itching and loose bowel movements.    Objective:  Vital Signs Last 24 Hrs  T(C): 36.3 (01 Dec 2018 13:23), Max: 36.8 (30 Nov 2018 20:46)  T(F): 97.4 (01 Dec 2018 13:23), Max: 98.3 (30 Nov 2018 20:46)  HR: 74 (01 Dec 2018 13:23) (71 - 74)  BP: 122/69 (01 Dec 2018 13:23) (122/69 - 149/72)  BP(mean): --  RR: 16 (01 Dec 2018 13:23) (16 - 18)  SpO2: 95% (01 Dec 2018 13:23) (93% - 96%)    Heent: QUENTIN, FREOM  Pul: decreased at bases  Cor: RSR, without murmurs  Abdomen: normal bowel sounds, without distension or tenderness  Extremities: without edema, motor/sensory intact, without calf pain, Homans sign negative.                        8.2    9.69  )-----------( 329      ( 01 Dec 2018 09:01 )             28.0       12-01    137  |  99  |  13  ----------------------------<  172<H>  3.7   |  31  |  4.10<H>    Ca    8.1<L>      01 Dec 2018 09:01  Phos  2.4     12-01  Mg     1.7     12-01    TPro  6.9  /  Alb  1.7<L>  /  TBili  0.4  /  DBili  .20  /  AST  21  /  ALT  11<L>  /  AlkPhos  207<H>  12-01 11-30 @ 07:01  -  12-01 @ 07:00  --------------------------------------------------------  IN:  Total IN: 0 mL    OUT:    Other: 2000 mL    Voided: 100 mL  Total OUT: 2100 mL    Total NET: -2100 mL

## 2018-12-01 NOTE — PROGRESS NOTE ADULT - SUBJECTIVE AND OBJECTIVE BOX
INTERVAL HPI/OVERNIGHT EVENTS:  pt seen and examined   denies abd pain  per overnight rn no acute gi issues overnight  afebrile overnight cbc noted    MEDICATIONS  (STANDING):  aspirin enteric coated 81 milliGRAM(s) Oral daily  atorvastatin 80 milliGRAM(s) Oral at bedtime  chlorhexidine 2% Cloths 1 Application(s) Topical daily  clopidogrel Tablet 75 milliGRAM(s) Oral daily  dextrose 5%. 1000 milliLiter(s) (50 mL/Hr) IV Continuous <Continuous>  dextrose 50% Injectable 12.5 Gram(s) IV Push once  dextrose 50% Injectable 25 Gram(s) IV Push once  dextrose 50% Injectable 25 Gram(s) IV Push once  docusate sodium 100 milliGRAM(s) Oral two times a day  epoetin analilia Injectable 84300 Unit(s) IV Push <User Schedule>  ertapenem  IVPB 500 milliGRAM(s) IV Intermittent every 24 hours  folic acid 1 milliGRAM(s) Oral daily  gabapentin 300 milliGRAM(s) Oral two times a day  glycerin Suppository - Adult 1 Suppository(s) Rectal daily  insulin glargine Injectable (LANTUS) 5 Unit(s) SubCutaneous at bedtime  insulin lispro (HumaLOG) corrective regimen sliding scale   SubCutaneous three times a day before meals  insulin lispro (HumaLOG) corrective regimen sliding scale   SubCutaneous at bedtime  isosorbide   mononitrate ER Tablet (IMDUR) 30 milliGRAM(s) Oral daily  lactobacillus acidophilus 1 Tablet(s) Oral three times a day with meals  metoprolol tartrate 50 milliGRAM(s) Oral two times a day  NIFEdipine XL 60 milliGRAM(s) Oral daily  pantoprazole    Tablet 40 milliGRAM(s) Oral before breakfast  senna 2 Tablet(s) Oral at bedtime  tamsulosin 0.4 milliGRAM(s) Oral at bedtime    MEDICATIONS  (PRN):  dextrose 40% Gel 15 Gram(s) Oral once PRN Blood Glucose LESS THAN 70 milliGRAM(s)/deciliter  glucagon  Injectable 1 milliGRAM(s) IntraMuscular once PRN Glucose LESS THAN 70 milligrams/deciliter  ondansetron Injectable 4 milliGRAM(s) IV Push every 6 hours PRN Nausea  simethicone 80 milliGRAM(s) Chew three times a day PRN Gas      Allergies    No Known Drug Allergies  Purell (Rash)    Intolerances        Review of Systems:    General:  No wt loss, fevers, chills, night sweats, fatigue   Eyes:  Good vision, no reported pain  ENT:  No sore throat, pain, runny nose, dysphagia  CV:  No pain, palpitations, hypo/hypertension  Resp:  No dyspnea, cough, tachypnea, wheezing  GI:  No pain, No nausea, No vomiting, No diarrhea, No constipation, No weight loss, No fever, No pruritis, No rectal bleeding, No melena, No dysphagia  :  No pain, bleeding, incontinence, nocturia  Muscle:  No pain, weakness  Neuro:  No weakness, tingling, memory problems  Psych:  No fatigue, insomnia, mood problems, depression  Endocrine:  No polyuria, polydypsia, cold/heat intolerance  Heme:  No petechiae, ecchymosis, easy bruisability  Skin:  No rash, tattoos, scars, edema      Vital Signs Last 24 Hrs  T(C): 36.8 (01 Dec 2018 04:46), Max: 36.8 (30 Nov 2018 20:46)  T(F): 98.2 (01 Dec 2018 04:46), Max: 98.3 (30 Nov 2018 20:46)  HR: 71 (01 Dec 2018 04:46) (71 - 75)  BP: 149/72 (01 Dec 2018 04:46) (146/73 - 149/72)  BP(mean): --  RR: 16 (01 Dec 2018 04:46) (16 - 19)  SpO2: 96% (01 Dec 2018 04:46) (93% - 99%)    PHYSICAL EXAM:    General:  lying in bed in nad  HEENT:  NC/AT  Chest: dec bs  Cardiovascular:  Regular rhythm, S1, S2  Abdomen: obese abd soft nt mild dt+ ecchymosis  Extremities:  no edema  Skin:  scattered ecchymosis  Neuro/Psych:  Awake alert responds appropriately      LABS:                        8.2    9.69  )-----------( 329      ( 01 Dec 2018 09:01 )             28.0     11-30    139  |  103  |  24<H>  ----------------------------<  150<H>  3.8   |  31  |  5.80<H>    Ca    8.3<L>      30 Nov 2018 08:18  Phos  3.6     11-30  Mg     1.9     11-30            RADIOLOGY & ADDITIONAL TESTS:

## 2018-12-01 NOTE — PROGRESS NOTE ADULT - SUBJECTIVE AND OBJECTIVE BOX
Patient is a 68y old  Female who presents with a chief complaint of abdominal pain (01 Dec 2018 09:26)        HPI:  this si a 70 y/o f with pmhx of htn, hld, obesity, HD dependant, DM 2 on basilglar, s/p cholecystotomy tube removal with biliary stent placement at [facility] p/w abdominal pain to ER. She developed abd pain while having HD and they had to stop that and send her to ED. Pt narrates that her pain is 4/10 in lower abdomen and non radiating. she was found ot have multiple liver lesion concening for liver abscesses and lipas eof 489 concerning for acute pancreatitis, GI was called for evaluation. pt ha sno wbc elevationan dno fever  acc by daughter in law (26 Nov 2018 20:39)      SUBJECTIVE & OBJECTIVE: Pt seen and examined at bedside, no acute complaints    PHYSICAL EXAM:  T(C): 36.3 (12-01-18 @ 13:23), Max: 36.8 (11-30-18 @ 20:46)  HR: 74 (12-01-18 @ 13:23) (71 - 74)  BP: 122/69 (12-01-18 @ 13:23) (122/69 - 149/72)  RR: 16 (12-01-18 @ 13:23) (16 - 18)  SpO2: 95% (12-01-18 @ 13:23) (93% - 96%)  Wt(kg): --   GENERAL: NAD, well-groomed, well-developed    ENMT: Moist mucous membranes  NECK: Supple, No JVD  NERVOUS SYSTEM:  Alert & Oriented X3, Motor Strength 5/5 B/L upper and lower extremities; DTRs 2+ intact and symmetric  CHEST/LUNG: Clear to auscultation bilaterally; No rales, rhonchi, wheezing, or rubs  HEART: Regular rate and rhythm; No murmurs, rubs, or gallops  ABDOMEN: Soft, Nontender, Nondistended; Bowel sounds present  EXTREMITIES:  2+ Peripheral Pulses, No clubbing, cyanosis, or edema        MEDICATIONS  (STANDING):  aspirin enteric coated 81 milliGRAM(s) Oral daily  atorvastatin 80 milliGRAM(s) Oral at bedtime  chlorhexidine 2% Cloths 1 Application(s) Topical daily  clopidogrel Tablet 75 milliGRAM(s) Oral daily  dextrose 5%. 1000 milliLiter(s) (50 mL/Hr) IV Continuous <Continuous>  dextrose 50% Injectable 12.5 Gram(s) IV Push once  dextrose 50% Injectable 25 Gram(s) IV Push once  dextrose 50% Injectable 25 Gram(s) IV Push once  docusate sodium 100 milliGRAM(s) Oral two times a day  epoetin analilia Injectable 58091 Unit(s) IV Push <User Schedule>  ertapenem  IVPB 500 milliGRAM(s) IV Intermittent every 24 hours  folic acid 1 milliGRAM(s) Oral daily  gabapentin 300 milliGRAM(s) Oral two times a day  glycerin Suppository - Adult 1 Suppository(s) Rectal daily  insulin glargine Injectable (LANTUS) 5 Unit(s) SubCutaneous at bedtime  insulin lispro (HumaLOG) corrective regimen sliding scale   SubCutaneous three times a day before meals  insulin lispro (HumaLOG) corrective regimen sliding scale   SubCutaneous at bedtime  isosorbide   mononitrate ER Tablet (IMDUR) 30 milliGRAM(s) Oral daily  lactobacillus acidophilus 1 Tablet(s) Oral three times a day with meals  metoprolol tartrate 50 milliGRAM(s) Oral two times a day  NIFEdipine XL 60 milliGRAM(s) Oral daily  pantoprazole    Tablet 40 milliGRAM(s) Oral before breakfast  senna 2 Tablet(s) Oral at bedtime  tamsulosin 0.4 milliGRAM(s) Oral at bedtime    MEDICATIONS  (PRN):  dextrose 40% Gel 15 Gram(s) Oral once PRN Blood Glucose LESS THAN 70 milliGRAM(s)/deciliter  glucagon  Injectable 1 milliGRAM(s) IntraMuscular once PRN Glucose LESS THAN 70 milligrams/deciliter  ondansetron Injectable 4 milliGRAM(s) IV Push every 6 hours PRN Nausea  simethicone 80 milliGRAM(s) Chew three times a day PRN Gas      LABS:                        8.2    9.69  )-----------( 329      ( 01 Dec 2018 09:01 )             28.0     12-01    137  |  99  |  13  ----------------------------<  172<H>  3.7   |  31  |  4.10<H>    Ca    8.1<L>      01 Dec 2018 09:01  Phos  2.4     12-01  Mg     1.7     12-01    TPro  6.9  /  Alb  1.7<L>  /  TBili  0.4  /  DBili  .20  /  AST  21  /  ALT  11<L>  /  AlkPhos  207<H>  12-01        Magnesium, Serum: 1.7 mg/dL (12-01 @ 09:01)    CAPILLARY BLOOD GLUCOSE      POCT Blood Glucose.: 126 mg/dL (01 Dec 2018 11:33)  POCT Blood Glucose.: 205 mg/dL (01 Dec 2018 07:29)  POCT Blood Glucose.: 175 mg/dL (30 Nov 2018 21:21)  POCT Blood Glucose.: 242 mg/dL (30 Nov 2018 16:56)      CAPILLARY BLOOD GLUCOSE      POCT Blood Glucose.: 126 mg/dL (01 Dec 2018 11:33)  POCT Blood Glucose.: 205 mg/dL (01 Dec 2018 07:29)  POCT Blood Glucose.: 175 mg/dL (30 Nov 2018 21:21)  POCT Blood Glucose.: 242 mg/dL (30 Nov 2018 16:56)    CAPILLARY BLOOD GLUCOSE      POCT Blood Glucose.: 126 mg/dL (01 Dec 2018 11:33)            RECENT CULTURES:      RADIOLOGY & ADDITIONAL TESTS:                        DVT/GI ppx  Discussed with pt @ bedside

## 2018-12-02 LAB
ANION GAP SERPL CALC-SCNC: 9 MMOL/L — SIGNIFICANT CHANGE UP (ref 5–17)
BUN SERPL-MCNC: 25 MG/DL — HIGH (ref 7–23)
CALCIUM SERPL-MCNC: 8.3 MG/DL — LOW (ref 8.5–10.1)
CHLORIDE SERPL-SCNC: 98 MMOL/L — SIGNIFICANT CHANGE UP (ref 96–108)
CO2 SERPL-SCNC: 29 MMOL/L — SIGNIFICANT CHANGE UP (ref 22–31)
CREAT SERPL-MCNC: 5.6 MG/DL — HIGH (ref 0.5–1.3)
CULTURE RESULTS: SIGNIFICANT CHANGE UP
CULTURE RESULTS: SIGNIFICANT CHANGE UP
GLUCOSE SERPL-MCNC: 163 MG/DL — HIGH (ref 70–99)
HCT VFR BLD CALC: 28.4 % — LOW (ref 34.5–45)
HGB BLD-MCNC: 8.5 G/DL — LOW (ref 11.5–15.5)
MCHC RBC-ENTMCNC: 27.4 PG — SIGNIFICANT CHANGE UP (ref 27–34)
MCHC RBC-ENTMCNC: 29.9 GM/DL — LOW (ref 32–36)
MCV RBC AUTO: 91.6 FL — SIGNIFICANT CHANGE UP (ref 80–100)
NRBC # BLD: 0 /100 WBCS — SIGNIFICANT CHANGE UP (ref 0–0)
PLATELET # BLD AUTO: 334 K/UL — SIGNIFICANT CHANGE UP (ref 150–400)
POTASSIUM SERPL-MCNC: 3.7 MMOL/L — SIGNIFICANT CHANGE UP (ref 3.5–5.3)
POTASSIUM SERPL-SCNC: 3.7 MMOL/L — SIGNIFICANT CHANGE UP (ref 3.5–5.3)
RBC # BLD: 3.1 M/UL — LOW (ref 3.8–5.2)
RBC # FLD: 16.8 % — HIGH (ref 10.3–14.5)
SODIUM SERPL-SCNC: 136 MMOL/L — SIGNIFICANT CHANGE UP (ref 135–145)
SPECIMEN SOURCE: SIGNIFICANT CHANGE UP
SPECIMEN SOURCE: SIGNIFICANT CHANGE UP
WBC # BLD: 10.76 K/UL — HIGH (ref 3.8–10.5)
WBC # FLD AUTO: 10.76 K/UL — HIGH (ref 3.8–10.5)

## 2018-12-02 PROCEDURE — 99232 SBSQ HOSP IP/OBS MODERATE 35: CPT

## 2018-12-02 RX ORDER — TIGECYCLINE 50 MG/5ML
INJECTION, POWDER, LYOPHILIZED, FOR SOLUTION INTRAVENOUS
Qty: 0 | Refills: 0 | Status: DISCONTINUED | OUTPATIENT
Start: 2018-12-02 | End: 2018-12-07

## 2018-12-02 RX ORDER — DIPHENHYDRAMINE HCL 50 MG
25 CAPSULE ORAL EVERY 6 HOURS
Qty: 0 | Refills: 0 | Status: DISCONTINUED | OUTPATIENT
Start: 2018-12-02 | End: 2018-12-07

## 2018-12-02 RX ORDER — TIGECYCLINE 50 MG/5ML
100 INJECTION, POWDER, LYOPHILIZED, FOR SOLUTION INTRAVENOUS ONCE
Qty: 0 | Refills: 0 | Status: COMPLETED | OUTPATIENT
Start: 2018-12-02 | End: 2018-12-02

## 2018-12-02 RX ORDER — TIGECYCLINE 50 MG/5ML
50 INJECTION, POWDER, LYOPHILIZED, FOR SOLUTION INTRAVENOUS EVERY 12 HOURS
Qty: 0 | Refills: 0 | Status: DISCONTINUED | OUTPATIENT
Start: 2018-12-02 | End: 2018-12-07

## 2018-12-02 RX ADMIN — Medication 1 TABLET(S): at 17:27

## 2018-12-02 RX ADMIN — Medication 1 TABLET(S): at 08:13

## 2018-12-02 RX ADMIN — TIGECYCLINE 110 MILLIGRAM(S): 50 INJECTION, POWDER, LYOPHILIZED, FOR SOLUTION INTRAVENOUS at 11:26

## 2018-12-02 RX ADMIN — Medication 60 MILLIGRAM(S): at 06:22

## 2018-12-02 RX ADMIN — Medication 100 MILLIGRAM(S): at 06:23

## 2018-12-02 RX ADMIN — Medication 81 MILLIGRAM(S): at 11:23

## 2018-12-02 RX ADMIN — INSULIN GLARGINE 5 UNIT(S): 100 INJECTION, SOLUTION SUBCUTANEOUS at 22:00

## 2018-12-02 RX ADMIN — CHLORHEXIDINE GLUCONATE 1 APPLICATION(S): 213 SOLUTION TOPICAL at 11:27

## 2018-12-02 RX ADMIN — CLOPIDOGREL BISULFATE 75 MILLIGRAM(S): 75 TABLET, FILM COATED ORAL at 11:23

## 2018-12-02 RX ADMIN — GABAPENTIN 300 MILLIGRAM(S): 400 CAPSULE ORAL at 17:27

## 2018-12-02 RX ADMIN — Medication 3: at 16:31

## 2018-12-02 RX ADMIN — Medication 50 MILLIGRAM(S): at 06:22

## 2018-12-02 RX ADMIN — Medication 1 TABLET(S): at 11:25

## 2018-12-02 RX ADMIN — Medication 1 APPLICATION(S): at 11:26

## 2018-12-02 RX ADMIN — ATORVASTATIN CALCIUM 80 MILLIGRAM(S): 80 TABLET, FILM COATED ORAL at 22:02

## 2018-12-02 RX ADMIN — Medication 50 MILLIGRAM(S): at 17:27

## 2018-12-02 RX ADMIN — GABAPENTIN 300 MILLIGRAM(S): 400 CAPSULE ORAL at 06:22

## 2018-12-02 RX ADMIN — TAMSULOSIN HYDROCHLORIDE 0.4 MILLIGRAM(S): 0.4 CAPSULE ORAL at 22:02

## 2018-12-02 RX ADMIN — TIGECYCLINE 105 MILLIGRAM(S): 50 INJECTION, POWDER, LYOPHILIZED, FOR SOLUTION INTRAVENOUS at 23:24

## 2018-12-02 RX ADMIN — PANTOPRAZOLE SODIUM 40 MILLIGRAM(S): 20 TABLET, DELAYED RELEASE ORAL at 06:22

## 2018-12-02 RX ADMIN — Medication 1 MILLIGRAM(S): at 11:23

## 2018-12-02 RX ADMIN — ISOSORBIDE MONONITRATE 30 MILLIGRAM(S): 60 TABLET, EXTENDED RELEASE ORAL at 11:23

## 2018-12-02 NOTE — PROGRESS NOTE ADULT - SUBJECTIVE AND OBJECTIVE BOX
INTERVAL HPI/OVERNIGHT EVENTS:  pt seen and examined  resting in bed denies abd pain  per overnight rn no acute gi issues  w c/o diarrhea yesterday  afebrile overnight no new labs to assess    MEDICATIONS  (STANDING):  aspirin enteric coated 81 milliGRAM(s) Oral daily  atorvastatin 80 milliGRAM(s) Oral at bedtime  chlorhexidine 2% Cloths 1 Application(s) Topical daily  clopidogrel Tablet 75 milliGRAM(s) Oral daily  dextrose 5%. 1000 milliLiter(s) (50 mL/Hr) IV Continuous <Continuous>  dextrose 50% Injectable 12.5 Gram(s) IV Push once  dextrose 50% Injectable 25 Gram(s) IV Push once  dextrose 50% Injectable 25 Gram(s) IV Push once  docusate sodium 100 milliGRAM(s) Oral two times a day  epoetin analilia Injectable 89635 Unit(s) IV Push <User Schedule>  ertapenem  IVPB 500 milliGRAM(s) IV Intermittent every 24 hours  famotidine    Tablet 20 milliGRAM(s) Oral once  folic acid 1 milliGRAM(s) Oral daily  gabapentin 300 milliGRAM(s) Oral two times a day  glycerin Suppository - Adult 1 Suppository(s) Rectal daily  insulin glargine Injectable (LANTUS) 5 Unit(s) SubCutaneous at bedtime  insulin lispro (HumaLOG) corrective regimen sliding scale   SubCutaneous three times a day before meals  insulin lispro (HumaLOG) corrective regimen sliding scale   SubCutaneous at bedtime  isosorbide   mononitrate ER Tablet (IMDUR) 30 milliGRAM(s) Oral daily  lactobacillus acidophilus 1 Tablet(s) Oral three times a day with meals  metoprolol tartrate 50 milliGRAM(s) Oral two times a day  NIFEdipine XL 60 milliGRAM(s) Oral daily  pantoprazole    Tablet 40 milliGRAM(s) Oral before breakfast  senna 2 Tablet(s) Oral at bedtime  tamsulosin 0.4 milliGRAM(s) Oral at bedtime    MEDICATIONS  (PRN):  dextrose 40% Gel 15 Gram(s) Oral once PRN Blood Glucose LESS THAN 70 milliGRAM(s)/deciliter  glucagon  Injectable 1 milliGRAM(s) IntraMuscular once PRN Glucose LESS THAN 70 milligrams/deciliter  hydrocortisone 1% Cream 1 Application(s) Topical two times a day PRN Rash and/or Itching  ondansetron Injectable 4 milliGRAM(s) IV Push every 6 hours PRN Nausea  simethicone 80 milliGRAM(s) Chew three times a day PRN Gas      Allergies    No Known Drug Allergies  Purell (Rash)    Intolerances        Review of Systems:    General:  No wt loss, fevers, chills, night sweats, fatigue   Eyes:  Good vision, no reported pain  ENT:  No sore throat, pain, runny nose, dysphagia  CV:  No pain, palpitations, hypo/hypertension  Resp:  No dyspnea, cough, tachypnea, wheezing  GI:  No pain, No nausea, No vomiting, No diarrhea, No constipation, No weight loss, No fever, No pruritis, No rectal bleeding, No melena, No dysphagia  :  No pain, bleeding, incontinence, nocturia  Muscle:  No pain, weakness  Neuro:  No weakness, tingling, memory problems  Psych:  No fatigue, insomnia, mood problems, depression  Endocrine:  No polyuria, polydypsia, cold/heat intolerance  Heme:  No petechiae, ecchymosis, easy bruisability  Skin:  No rash, tattoos, scars, edema      Vital Signs Last 24 Hrs  T(C): 36.3 (02 Dec 2018 05:13), Max: 36.6 (01 Dec 2018 20:35)  T(F): 97.4 (02 Dec 2018 05:13), Max: 97.9 (01 Dec 2018 20:35)  HR: 75 (02 Dec 2018 05:13) (73 - 75)  BP: 129/72 (02 Dec 2018 05:13) (113/66 - 129/72)  BP(mean): --  RR: 16 (02 Dec 2018 05:13) (16 - 18)  SpO2: 94% (02 Dec 2018 05:13) (94% - 95%)    PHYSICAL EXAM:    Constitutional: NAD  HEENT:  NC/AT  Chest: dec bs  Cardiovascular:  Regular rhythm, S1, S2  Abdomen: obese abd soft nt mild dt+ ecchymosis  Extremities:  no edema  Skin:  scattered ecchymosis  Neuro/Psych:  Awake alert responds appropriately      LABS:                        8.2    9.69  )-----------( 329      ( 01 Dec 2018 09:01 )             28.0     12-01    137  |  99  |  13  ----------------------------<  172<H>  3.7   |  31  |  4.10<H>    Ca    8.1<L>      01 Dec 2018 09:01  Phos  2.4     12-01  Mg     1.7     12-01    TPro  6.9  /  Alb  1.7<L>  /  TBili  0.4  /  DBili  .20  /  AST  21  /  ALT  11<L>  /  AlkPhos  207<H>  12-01          RADIOLOGY & ADDITIONAL TESTS:

## 2018-12-02 NOTE — PROGRESS NOTE ADULT - NSHPATTENDINGPLANDISCUSS_GEN_ALL_CORE
Dr Sue
pt, pt's family, consultants

## 2018-12-02 NOTE — PROGRESS NOTE ADULT - SUBJECTIVE AND OBJECTIVE BOX
Patient is a 68y old  Female who presents with a chief complaint of abdominal pain (02 Dec 2018 10:31)        HPI:  this si a 70 y/o f with pmhx of htn, hld, obesity, HD dependant, DM 2 on basilglar, s/p cholecystotomy tube removal with biliary stent placement at [facility] p/w abdominal pain to ER. She developed abd pain while having HD and they had to stop that and send her to ED. Pt narrates that her pain is 4/10 in lower abdomen and non radiating. she was found ot have multiple liver lesion concening for liver abscesses and lipas eof 489 concerning for acute pancreatitis, GI was called for evaluation. pt ha sno wbc elevationan dno fever  acc by daughter in law (26 Nov 2018 20:39)      SUBJECTIVE & OBJECTIVE: Pt seen and examined at bedside, has generalized uritcaria/itcing     PHYSICAL EXAM:  T(C): 36.3 (12-02-18 @ 05:13), Max: 36.6 (12-01-18 @ 20:35)  HR: 75 (12-02-18 @ 05:13) (73 - 75)  BP: 129/72 (12-02-18 @ 05:13) (113/66 - 129/72)  RR: 16 (12-02-18 @ 05:13) (16 - 18)  SpO2: 94% (12-02-18 @ 05:13) (94% - 95%)  Wt(kg): --   GENERAL: itching   NERVOUS SYSTEM:  Alert & Oriented X3, Motor Strength 5/5 B/L upper and lower extremities; DTRs 2+ intact and symmetric  CHEST/LUNG:decrease air entry at the bases   HEART: Regular rate and rhythm; No murmurs, rubs, or gallops  ABDOMEN: Soft, Nontender, Nondistended; Bowel sounds present  EXTREMITIES:  2+ Peripheral Pulses, No clubbing, cyanosis, or edema        MEDICATIONS  (STANDING):  aspirin enteric coated 81 milliGRAM(s) Oral daily  atorvastatin 80 milliGRAM(s) Oral at bedtime  chlorhexidine 2% Cloths 1 Application(s) Topical daily  clopidogrel Tablet 75 milliGRAM(s) Oral daily  dextrose 5%. 1000 milliLiter(s) (50 mL/Hr) IV Continuous <Continuous>  dextrose 50% Injectable 12.5 Gram(s) IV Push once  dextrose 50% Injectable 25 Gram(s) IV Push once  dextrose 50% Injectable 25 Gram(s) IV Push once  docusate sodium 100 milliGRAM(s) Oral two times a day  epoetin analilia Injectable 94803 Unit(s) IV Push <User Schedule>  famotidine    Tablet 20 milliGRAM(s) Oral once  folic acid 1 milliGRAM(s) Oral daily  gabapentin 300 milliGRAM(s) Oral two times a day  insulin glargine Injectable (LANTUS) 5 Unit(s) SubCutaneous at bedtime  insulin lispro (HumaLOG) corrective regimen sliding scale   SubCutaneous three times a day before meals  insulin lispro (HumaLOG) corrective regimen sliding scale   SubCutaneous at bedtime  isosorbide   mononitrate ER Tablet (IMDUR) 30 milliGRAM(s) Oral daily  lactobacillus acidophilus 1 Tablet(s) Oral three times a day with meals  metoprolol tartrate 50 milliGRAM(s) Oral two times a day  NIFEdipine XL 60 milliGRAM(s) Oral daily  pantoprazole    Tablet 40 milliGRAM(s) Oral before breakfast  senna 2 Tablet(s) Oral at bedtime  tamsulosin 0.4 milliGRAM(s) Oral at bedtime  tigecycline IVPB      tigecycline IVPB 50 milliGRAM(s) IV Intermittent every 12 hours    MEDICATIONS  (PRN):  dextrose 40% Gel 15 Gram(s) Oral once PRN Blood Glucose LESS THAN 70 milliGRAM(s)/deciliter  diphenhydrAMINE 25 milliGRAM(s) Oral every 6 hours PRN Rash and/or Itching  glucagon  Injectable 1 milliGRAM(s) IntraMuscular once PRN Glucose LESS THAN 70 milligrams/deciliter  hydrocortisone 1% Cream 1 Application(s) Topical two times a day PRN Rash and/or Itching  ondansetron Injectable 4 milliGRAM(s) IV Push every 6 hours PRN Nausea  simethicone 80 milliGRAM(s) Chew three times a day PRN Gas      LABS:                        8.5    10.76 )-----------( 334      ( 02 Dec 2018 09:46 )             28.4     12-02    136  |  98  |  25<H>  ----------------------------<  163<H>  3.7   |  29  |  5.60<H>    Ca    8.3<L>      02 Dec 2018 09:46  Phos  2.4     12-01  Mg     1.7     12-01    TPro  6.9  /  Alb  1.7<L>  /  TBili  0.4  /  DBili  .20  /  AST  21  /  ALT  11<L>  /  AlkPhos  207<H>  12-01          CAPILLARY BLOOD GLUCOSE      POCT Blood Glucose.: 225 mg/dL (02 Dec 2018 11:44)  POCT Blood Glucose.: 114 mg/dL (02 Dec 2018 07:43)  POCT Blood Glucose.: 155 mg/dL (01 Dec 2018 21:10)  POCT Blood Glucose.: 258 mg/dL (01 Dec 2018 17:00)      CAPILLARY BLOOD GLUCOSE      POCT Blood Glucose.: 225 mg/dL (02 Dec 2018 11:44)  POCT Blood Glucose.: 114 mg/dL (02 Dec 2018 07:43)  POCT Blood Glucose.: 155 mg/dL (01 Dec 2018 21:10)  POCT Blood Glucose.: 258 mg/dL (01 Dec 2018 17:00)    CAPILLARY BLOOD GLUCOSE      POCT Blood Glucose.: 225 mg/dL (02 Dec 2018 11:44)            RECENT CULTURES:      RADIOLOGY & ADDITIONAL TESTS:                        DVT/GI ppx  Discussed with pt @ bedside

## 2018-12-02 NOTE — PROGRESS NOTE ADULT - SUBJECTIVE AND OBJECTIVE BOX
[facility], Division of Infectious Diseases  DEB Barbosa A. Lee  [phone]    Name: IRENE TAYLOR  Age: 68y  Gender: Female  MRN: [id number]    Interval History--  Notes reviewed-- callled overnight as pt refusing her antibx secondary to rash that developed  and itchiness,   Currently, she states itichness is better.  but rash persists    Past Medical History--  ESRD (end stage renal disease) on dialysis  CAD (coronary artery disease)  Diabetes  CRF (chronic renal failure)  Hypertension  Pneumonia  Myocardial infarction  Hypertension  Diabetes  H/O: hysterectomy      For details regarding the patient's social history, family history, and other miscellaneous elements, please refer the initial infectious diseases consultation and/or the admitting history and physical examination for this admission.    Allergies    No Known Drug Allergies  Purell (Rash)    Intolerances        Medications--  Antibiotics:  ertapenem  IVPB 500 milliGRAM(s) IV Intermittent every 24 hours    Immunologic:  epoetin analilia Injectable 68858 Unit(s) IV Push <User Schedule>    Other:  aspirin enteric coated  atorvastatin  chlorhexidine 2% Cloths  clopidogrel Tablet  dextrose 40% Gel PRN  dextrose 5%.  dextrose 50% Injectable  dextrose 50% Injectable  dextrose 50% Injectable  docusate sodium  famotidine    Tablet  folic acid  gabapentin  glucagon  Injectable PRN  hydrocortisone 1% Cream PRN  insulin glargine Injectable (LANTUS)  insulin lispro (HumaLOG) corrective regimen sliding scale  insulin lispro (HumaLOG) corrective regimen sliding scale  isosorbide   mononitrate ER Tablet (IMDUR)  lactobacillus acidophilus  metoprolol tartrate  NIFEdipine XL  ondansetron Injectable PRN  pantoprazole    Tablet  senna  simethicone PRN  tamsulosin      Review of Systems--  A 10-point review of systems was obtained.     Pertinent positives and negatives--  Constitutional: No fevers. No Chills. No Rigors.   Cardiovascular: No chest pain. No palpitations.  Respiratory: No shortness of breath. No cough.  Gastrointestinal: No nausea or vomiting. No diarrhea or constipation.     Review of systems otherwise negative except as previously noted.    Physical Examination--  Vital Signs: T(F): 97.4 (12-02-18 @ 05:13), Max: 97.9 (12-01-18 @ 20:35)  HR: 75 (12-02-18 @ 05:13)  BP: 129/72 (12-02-18 @ 05:13)  RR: 16 (12-02-18 @ 05:13)  SpO2: 94% (12-02-18 @ 05:13)  Wt(kg): --  General: Nontoxic-appearing Female in no acute distress.  HEENT: AT/NC anicteric. no rash mucus membranes in ooropharynx  Neck: Not rigid. No sense of mass.  Nodes: None palpable.  Lungs: Clear bilaterally without rales, wheezing or rhonchi  Heart: Regular rate and rhythm. No Murmur.   Abdomen: Bowel sounds present and normoactive. Soft. Nondistended. Nontender.   Extremities: No cyanosis or clubbing. No edema.   Skin: rash. diffuse maculopapular  Psychiatric: lethargic      Laboratory Studies--  CBC                        8.5    10.76 )-----------( 334      ( 02 Dec 2018 09:46 )             28.4       Chemistries  12-01    137  |  99  |  13  ----------------------------<  172<H>  3.7   |  31  |  4.10<H>    Ca    8.1<L>      01 Dec 2018 09:01  Phos  2.4     12-01  Mg     1.7     12-01    TPro  6.9  /  Alb  1.7<L>  /  TBili  0.4  /  DBili  .20  /  AST  21  /  ALT  11<L>  /  AlkPhos  207<H>  12-01      Culture Data    Culture - Urine (collected 29 Nov 2018 00:10)  Source: .Urine Catheterized  Final Report (29 Nov 2018 22:43):    No growth    Culture - Blood (collected 27 Nov 2018 00:16)  Source: .Blood Blood  Final Report (02 Dec 2018 01:05):    No growth at 5 days.    Culture - Blood (collected 27 Nov 2018 00:16)  Source: .Blood Blood  Final Report (02 Dec 2018 01:05):    No growth at 5 days.

## 2018-12-03 DIAGNOSIS — R19.5 OTHER FECAL ABNORMALITIES: ICD-10-CM

## 2018-12-03 LAB
ALBUMIN SERPL ELPH-MCNC: 2.1 G/DL — LOW (ref 3.3–5)
ALP SERPL-CCNC: 208 U/L — HIGH (ref 40–120)
ALT FLD-CCNC: 14 U/L — SIGNIFICANT CHANGE UP (ref 12–78)
ANION GAP SERPL CALC-SCNC: 10 MMOL/L — SIGNIFICANT CHANGE UP (ref 5–17)
AST SERPL-CCNC: 21 U/L — SIGNIFICANT CHANGE UP (ref 15–37)
BILIRUB SERPL-MCNC: 0.4 MG/DL — SIGNIFICANT CHANGE UP (ref 0.2–1.2)
BUN SERPL-MCNC: 48 MG/DL — HIGH (ref 7–23)
CALCIUM SERPL-MCNC: 8.1 MG/DL — LOW (ref 8.5–10.1)
CHLORIDE SERPL-SCNC: 97 MMOL/L — SIGNIFICANT CHANGE UP (ref 96–108)
CO2 SERPL-SCNC: 28 MMOL/L — SIGNIFICANT CHANGE UP (ref 22–31)
CREAT SERPL-MCNC: 6.6 MG/DL — HIGH (ref 0.5–1.3)
GLUCOSE SERPL-MCNC: 200 MG/DL — HIGH (ref 70–99)
HCT VFR BLD CALC: 27.8 % — LOW (ref 34.5–45)
HGB BLD-MCNC: 8.4 G/DL — LOW (ref 11.5–15.5)
MCHC RBC-ENTMCNC: 27.3 PG — SIGNIFICANT CHANGE UP (ref 27–34)
MCHC RBC-ENTMCNC: 30.2 GM/DL — LOW (ref 32–36)
MCV RBC AUTO: 90.3 FL — SIGNIFICANT CHANGE UP (ref 80–100)
NRBC # BLD: 0 /100 WBCS — SIGNIFICANT CHANGE UP (ref 0–0)
PLATELET # BLD AUTO: 374 K/UL — SIGNIFICANT CHANGE UP (ref 150–400)
POTASSIUM SERPL-MCNC: 3.8 MMOL/L — SIGNIFICANT CHANGE UP (ref 3.5–5.3)
POTASSIUM SERPL-SCNC: 3.8 MMOL/L — SIGNIFICANT CHANGE UP (ref 3.5–5.3)
PROT SERPL-MCNC: 7.4 G/DL — SIGNIFICANT CHANGE UP (ref 6–8.3)
RBC # BLD: 3.08 M/UL — LOW (ref 3.8–5.2)
RBC # FLD: 17.2 % — HIGH (ref 10.3–14.5)
SODIUM SERPL-SCNC: 135 MMOL/L — SIGNIFICANT CHANGE UP (ref 135–145)
WBC # BLD: 13.95 K/UL — HIGH (ref 3.8–10.5)
WBC # FLD AUTO: 13.95 K/UL — HIGH (ref 3.8–10.5)

## 2018-12-03 PROCEDURE — 99232 SBSQ HOSP IP/OBS MODERATE 35: CPT

## 2018-12-03 RX ORDER — CHOLESTYRAMINE 4 G/9G
4 POWDER, FOR SUSPENSION ORAL DAILY
Qty: 0 | Refills: 0 | Status: DISCONTINUED | OUTPATIENT
Start: 2018-12-03 | End: 2018-12-07

## 2018-12-03 RX ADMIN — Medication 50 MILLIGRAM(S): at 05:26

## 2018-12-03 RX ADMIN — Medication 60 MILLIGRAM(S): at 05:26

## 2018-12-03 RX ADMIN — ISOSORBIDE MONONITRATE 30 MILLIGRAM(S): 60 TABLET, EXTENDED RELEASE ORAL at 14:55

## 2018-12-03 RX ADMIN — Medication 1: at 12:21

## 2018-12-03 RX ADMIN — CHLORHEXIDINE GLUCONATE 1 APPLICATION(S): 213 SOLUTION TOPICAL at 14:56

## 2018-12-03 RX ADMIN — Medication 1 TABLET(S): at 17:11

## 2018-12-03 RX ADMIN — Medication 25 MILLIGRAM(S): at 00:04

## 2018-12-03 RX ADMIN — GABAPENTIN 300 MILLIGRAM(S): 400 CAPSULE ORAL at 05:26

## 2018-12-03 RX ADMIN — Medication 1 TABLET(S): at 08:41

## 2018-12-03 RX ADMIN — CLOPIDOGREL BISULFATE 75 MILLIGRAM(S): 75 TABLET, FILM COATED ORAL at 14:55

## 2018-12-03 RX ADMIN — TAMSULOSIN HYDROCHLORIDE 0.4 MILLIGRAM(S): 0.4 CAPSULE ORAL at 21:38

## 2018-12-03 RX ADMIN — Medication 1: at 17:11

## 2018-12-03 RX ADMIN — CHOLESTYRAMINE 4 GRAM(S): 4 POWDER, FOR SUSPENSION ORAL at 14:54

## 2018-12-03 RX ADMIN — ERYTHROPOIETIN 10000 UNIT(S): 10000 INJECTION, SOLUTION INTRAVENOUS; SUBCUTANEOUS at 12:54

## 2018-12-03 RX ADMIN — INSULIN GLARGINE 5 UNIT(S): 100 INJECTION, SOLUTION SUBCUTANEOUS at 22:03

## 2018-12-03 RX ADMIN — Medication 50 MILLIGRAM(S): at 17:11

## 2018-12-03 RX ADMIN — PANTOPRAZOLE SODIUM 40 MILLIGRAM(S): 20 TABLET, DELAYED RELEASE ORAL at 05:27

## 2018-12-03 RX ADMIN — TIGECYCLINE 105 MILLIGRAM(S): 50 INJECTION, POWDER, LYOPHILIZED, FOR SOLUTION INTRAVENOUS at 14:54

## 2018-12-03 RX ADMIN — Medication 1 APPLICATION(S): at 16:29

## 2018-12-03 RX ADMIN — Medication 81 MILLIGRAM(S): at 14:55

## 2018-12-03 RX ADMIN — Medication 1 MILLIGRAM(S): at 14:55

## 2018-12-03 RX ADMIN — GABAPENTIN 300 MILLIGRAM(S): 400 CAPSULE ORAL at 17:11

## 2018-12-03 RX ADMIN — Medication 1 TABLET(S): at 14:55

## 2018-12-03 RX ADMIN — ATORVASTATIN CALCIUM 80 MILLIGRAM(S): 80 TABLET, FILM COATED ORAL at 21:38

## 2018-12-03 NOTE — DIETITIAN INITIAL EVALUATION ADULT. - NS AS NUTRI INTERV ED CONTENT
Will remain available for diet/DM nutrition therapy. Pt declined DM, Renal nutrition therapy. Per pt follows outpatient dietitian. RDs name/phone number left with patient if questions/concerns arise.

## 2018-12-03 NOTE — DIETITIAN INITIAL EVALUATION ADULT. - PERTINENT MEDS FT
tygacil, questral light, benadryl, pepcid, lantus 5units qhs, folic acid, epogen, mylicon, lactobacillus, protonix

## 2018-12-03 NOTE — DIETITIAN INITIAL EVALUATION ADULT. - PHYSICAL APPEARANCE
Unable to assess pts BMI as height not available BMI 31 (class I obesity) Based on stated height 64"

## 2018-12-03 NOTE — PROGRESS NOTE ADULT - SUBJECTIVE AND OBJECTIVE BOX
INTERVAL HPI/OVERNIGHT EVENTS:  pt seen and examined  denies abd pain, c/o loose bms  overnight rn confirmed above  3bms documented yesterday per flow sheets  afebrile overnight no  labs to assess    MEDICATIONS  (STANDING):  aspirin enteric coated 81 milliGRAM(s) Oral daily  atorvastatin 80 milliGRAM(s) Oral at bedtime  chlorhexidine 2% Cloths 1 Application(s) Topical daily  cholestyramine Powder (Sugar-Free) 4 Gram(s) Oral daily  clopidogrel Tablet 75 milliGRAM(s) Oral daily  dextrose 5%. 1000 milliLiter(s) (50 mL/Hr) IV Continuous <Continuous>  dextrose 50% Injectable 12.5 Gram(s) IV Push once  dextrose 50% Injectable 25 Gram(s) IV Push once  dextrose 50% Injectable 25 Gram(s) IV Push once  epoetin analilia Injectable 86949 Unit(s) IV Push <User Schedule>  famotidine    Tablet 20 milliGRAM(s) Oral once  folic acid 1 milliGRAM(s) Oral daily  gabapentin 300 milliGRAM(s) Oral two times a day  insulin glargine Injectable (LANTUS) 5 Unit(s) SubCutaneous at bedtime  insulin lispro (HumaLOG) corrective regimen sliding scale   SubCutaneous three times a day before meals  insulin lispro (HumaLOG) corrective regimen sliding scale   SubCutaneous at bedtime  isosorbide   mononitrate ER Tablet (IMDUR) 30 milliGRAM(s) Oral daily  lactobacillus acidophilus 1 Tablet(s) Oral three times a day with meals  metoprolol tartrate 50 milliGRAM(s) Oral two times a day  NIFEdipine XL 60 milliGRAM(s) Oral daily  pantoprazole    Tablet 40 milliGRAM(s) Oral before breakfast  tamsulosin 0.4 milliGRAM(s) Oral at bedtime  tigecycline IVPB      tigecycline IVPB 50 milliGRAM(s) IV Intermittent every 12 hours    MEDICATIONS  (PRN):  dextrose 40% Gel 15 Gram(s) Oral once PRN Blood Glucose LESS THAN 70 milliGRAM(s)/deciliter  diphenhydrAMINE 25 milliGRAM(s) Oral every 6 hours PRN Rash and/or Itching  glucagon  Injectable 1 milliGRAM(s) IntraMuscular once PRN Glucose LESS THAN 70 milligrams/deciliter  hydrocortisone 1% Cream 1 Application(s) Topical two times a day PRN Rash and/or Itching  ondansetron Injectable 4 milliGRAM(s) IV Push every 6 hours PRN Nausea  simethicone 80 milliGRAM(s) Chew three times a day PRN Gas      Allergies    No Known Drug Allergies  Purell (Rash)    Intolerances        Review of Systems:    General:  No wt loss, fevers, chills, night sweats, fatigue   Eyes:  Good vision, no reported pain  ENT:  No sore throat, pain, runny nose, dysphagia  CV:  No pain, palpitations, hypo/hypertension  Resp:  No dyspnea, cough, tachypnea, wheezing  GI:  No pain, No nausea, No vomiting, +diarrhea, No constipation, No weight loss, No fever, No pruritis, No rectal bleeding, No melena, No dysphagia  :  No pain, bleeding, incontinence, nocturia  Muscle:  No pain, weakness  Neuro:  No weakness, tingling, memory problems  Psych:  No fatigue, insomnia, mood problems, depression  Endocrine:  No polyuria, polydypsia, cold/heat intolerance  Heme:  No petechiae, ecchymosis, easy bruisability  Skin:  No rash, tattoos, scars, edema      Vital Signs Last 24 Hrs  T(C): 36.7 (03 Dec 2018 05:11), Max: 36.8 (02 Dec 2018 13:14)  T(F): 98.1 (03 Dec 2018 05:11), Max: 98.2 (02 Dec 2018 13:14)  HR: 79 (03 Dec 2018 05:11) (69 - 79)  BP: 155/70 (03 Dec 2018 05:11) (112/68 - 155/70)  BP(mean): --  RR: 19 (03 Dec 2018 05:11) (16 - 19)  SpO2: 94% (03 Dec 2018 05:11) (94% - 95%)    PHYSICAL EXAM:  Constitutional: NAD  HEENT:  NC/AT  Chest: dec bs  Cardiovascular:  Regular rhythm, S1, S2  Abdomen: obese abd soft nt mild dt+ ecchymosis  Extremities:  no edema  Skin:  scattered ecchymosis  Neuro/Psych:  Awake alert responds appropriately      LABS:                        8.5    10.76 )-----------( 334      ( 02 Dec 2018 09:46 )             28.4     12-02    136  |  98  |  25<H>  ----------------------------<  163<H>  3.7   |  29  |  5.60<H>    Ca    8.3<L>      02 Dec 2018 09:46  Phos  2.4     12-01  Mg     1.7     12-01    TPro  6.9  /  Alb  1.7<L>  /  TBili  0.4  /  DBili  .20  /  AST  21  /  ALT  11<L>  /  AlkPhos  207<H>  12-01          RADIOLOGY & ADDITIONAL TESTS:

## 2018-12-03 NOTE — PROGRESS NOTE ADULT - SUBJECTIVE AND OBJECTIVE BOX
Patient is a 68y old  Female who presents with a chief complaint of abdominal pain (03 Dec 2018 10:28)        HPI:  this si a 70 y/o f with pmhx of htn, hld, obesity, HD dependant, DM 2 on basilglar, s/p cholecystotomy tube removal with biliary stent placement at [facility] p/w abdominal pain to ER. She developed abd pain while having HD and they had to stop that and send her to ED. Pt narrates that her pain is 4/10 in lower abdomen and non radiating. she was found ot have multiple liver lesion concening for liver abscesses and lipas eof 489 concerning for acute pancreatitis, GI was called for evaluation. pt ha sno wbc elevationan dno fever  acc by daughter in law (26 Nov 2018 20:39)      SUBJECTIVE & OBJECTIVE: Pt seen and examined at bedside, dann crum complaints     PHYSICAL EXAM:  T(C): 36.7 (12-03-18 @ 10:26), Max: 37.2 (12-03-18 @ 09:43)  HR: 77 (12-03-18 @ 10:26) (76 - 79)  BP: 146/69 (12-03-18 @ 10:26) (119/59 - 155/70)  RR: 18 (12-03-18 @ 10:26) (16 - 19)  SpO2: 97% (12-03-18 @ 10:26) (94% - 97%)  Wt(kg): --   GENERAL: NAD, well-groomed, well-developed  NERVOUS SYSTEM:  Alert & Oriented X3, Motor Strength 5/5 B/L upper and lower extremities; DTRs 2+ intact and symmetric  CHEST/LUNG: Clear to auscultation bilaterally; No rales, rhonchi, wheezing, or rubs  HEART: Regular rate and rhythm; No murmurs, rubs, or gallops  ABDOMEN: Soft, Nontender, Nondistended; Bowel sounds present  EXTREMITIES:  2+ Peripheral Pulses, No clubbing, cyanosis, or edema        MEDICATIONS  (STANDING):  aspirin enteric coated 81 milliGRAM(s) Oral daily  atorvastatin 80 milliGRAM(s) Oral at bedtime  chlorhexidine 2% Cloths 1 Application(s) Topical daily  cholestyramine Powder (Sugar-Free) 4 Gram(s) Oral daily  clopidogrel Tablet 75 milliGRAM(s) Oral daily  dextrose 5%. 1000 milliLiter(s) (50 mL/Hr) IV Continuous <Continuous>  dextrose 50% Injectable 12.5 Gram(s) IV Push once  dextrose 50% Injectable 25 Gram(s) IV Push once  dextrose 50% Injectable 25 Gram(s) IV Push once  epoetin analilia Injectable 71357 Unit(s) IV Push <User Schedule>  famotidine    Tablet 20 milliGRAM(s) Oral once  folic acid 1 milliGRAM(s) Oral daily  gabapentin 300 milliGRAM(s) Oral two times a day  insulin glargine Injectable (LANTUS) 5 Unit(s) SubCutaneous at bedtime  insulin lispro (HumaLOG) corrective regimen sliding scale   SubCutaneous three times a day before meals  insulin lispro (HumaLOG) corrective regimen sliding scale   SubCutaneous at bedtime  isosorbide   mononitrate ER Tablet (IMDUR) 30 milliGRAM(s) Oral daily  lactobacillus acidophilus 1 Tablet(s) Oral three times a day with meals  metoprolol tartrate 50 milliGRAM(s) Oral two times a day  NIFEdipine XL 60 milliGRAM(s) Oral daily  pantoprazole    Tablet 40 milliGRAM(s) Oral before breakfast  tamsulosin 0.4 milliGRAM(s) Oral at bedtime  tigecycline IVPB      tigecycline IVPB 50 milliGRAM(s) IV Intermittent every 12 hours    MEDICATIONS  (PRN):  dextrose 40% Gel 15 Gram(s) Oral once PRN Blood Glucose LESS THAN 70 milliGRAM(s)/deciliter  diphenhydrAMINE 25 milliGRAM(s) Oral every 6 hours PRN Rash and/or Itching  glucagon  Injectable 1 milliGRAM(s) IntraMuscular once PRN Glucose LESS THAN 70 milligrams/deciliter  hydrocortisone 1% Cream 1 Application(s) Topical two times a day PRN Rash and/or Itching  ondansetron Injectable 4 milliGRAM(s) IV Push every 6 hours PRN Nausea  simethicone 80 milliGRAM(s) Chew three times a day PRN Gas      LABS:                        8.4    13.95 )-----------( 374      ( 03 Dec 2018 11:49 )             27.8     12-03    135  |  97  |  48<H>  ----------------------------<  200<H>  3.8   |  28  |  6.60<H>    Ca    8.1<L>      03 Dec 2018 11:49    TPro  7.4  /  Alb  2.1<L>  /  TBili  0.4  /  DBili  x   /  AST  21  /  ALT  14  /  AlkPhos  208<H>  12-03          CAPILLARY BLOOD GLUCOSE      POCT Blood Glucose.: 152 mg/dL (03 Dec 2018 12:07)  POCT Blood Glucose.: 129 mg/dL (03 Dec 2018 08:11)  POCT Blood Glucose.: 218 mg/dL (02 Dec 2018 21:27)  POCT Blood Glucose.: 251 mg/dL (02 Dec 2018 16:23)      CAPILLARY BLOOD GLUCOSE      POCT Blood Glucose.: 152 mg/dL (03 Dec 2018 12:07)  POCT Blood Glucose.: 129 mg/dL (03 Dec 2018 08:11)  POCT Blood Glucose.: 218 mg/dL (02 Dec 2018 21:27)  POCT Blood Glucose.: 251 mg/dL (02 Dec 2018 16:23)    CAPILLARY BLOOD GLUCOSE      POCT Blood Glucose.: 152 mg/dL (03 Dec 2018 12:07)            RECENT CULTURES:      RADIOLOGY & ADDITIONAL TESTS:                        DVT/GI ppx  Discussed with pt @ bedside

## 2018-12-03 NOTE — PROGRESS NOTE ADULT - PROBLEM SELECTOR PLAN 3
bowel regimen dcd  cont bacid, cont questran  if persists/w rising leukocytosis, send stool studies  monitor abd exam/stool output

## 2018-12-03 NOTE — DIETITIAN INITIAL EVALUATION ADULT. - NUTRITIONGOAL OUTCOME1
Pt to comply with above therapeutic diet for optimal glycemic control/renal function. Pt to comply with above therapeutic diet for optimal glycemic control & nutritional status.

## 2018-12-03 NOTE — PROGRESS NOTE ADULT - SUBJECTIVE AND OBJECTIVE BOX
pt seen  no complaints  ICU Vital Signs Last 24 Hrs  T(C): 37.2 (03 Dec 2018 09:43), Max: 37.2 (03 Dec 2018 09:43)  T(F): 99 (03 Dec 2018 09:43), Max: 99 (03 Dec 2018 09:43)  HR: 76 (03 Dec 2018 09:43) (69 - 79)  BP: 119/59 (03 Dec 2018 09:43) (112/68 - 155/70)  BP(mean): --  ABP: --  ABP(mean): --  RR: 18 (03 Dec 2018 09:43) (16 - 19)  SpO2: 97% (03 Dec 2018 09:43) (94% - 97%)  gen-NAD, ambulating  resp-clear  abd-soft NT/ND                          8.5    10.76 )-----------( 334      ( 02 Dec 2018 09:46 )             28.4   12-02    136  |  98  |  25<H>  ----------------------------<  163<H>  3.7   |  29  |  5.60<H>    Ca    8.3<L>      02 Dec 2018 09:46

## 2018-12-03 NOTE — DIETITIAN INITIAL EVALUATION ADULT. - NS AS NUTRI INTERV MED MANAGE
Recommend current HgbA1c level to further assess BS control. Recommend current HgbA1c level to further assess BS control. Recommend lipid profile.

## 2018-12-03 NOTE — PROGRESS NOTE ADULT - SUBJECTIVE AND OBJECTIVE BOX
Patient is a 68y old  Female who presents with a chief complaint of abdominal pain (28 Nov 2018 08:26)      Patient seen in follow up for ESRD on HD. On abx.     PAST MEDICAL HISTORY:  ESRD (end stage renal disease) on dialysis  CAD (coronary artery disease)  Diabetes  CRF (chronic renal failure)  Hypertension  Pneumonia  Myocardial infarction  Hypertension  Diabetes     MEDICATIONS  (STANDING):  aspirin enteric coated 81 milliGRAM(s) Oral daily  atorvastatin 80 milliGRAM(s) Oral at bedtime  chlorhexidine 2% Cloths 1 Application(s) Topical daily  cholestyramine Powder (Sugar-Free) 4 Gram(s) Oral daily  clopidogrel Tablet 75 milliGRAM(s) Oral daily  dextrose 5%. 1000 milliLiter(s) (50 mL/Hr) IV Continuous <Continuous>  dextrose 50% Injectable 12.5 Gram(s) IV Push once  dextrose 50% Injectable 25 Gram(s) IV Push once  dextrose 50% Injectable 25 Gram(s) IV Push once  epoetin analilia Injectable 02552 Unit(s) IV Push <User Schedule>  famotidine    Tablet 20 milliGRAM(s) Oral once  folic acid 1 milliGRAM(s) Oral daily  gabapentin 300 milliGRAM(s) Oral two times a day  insulin glargine Injectable (LANTUS) 5 Unit(s) SubCutaneous at bedtime  insulin lispro (HumaLOG) corrective regimen sliding scale   SubCutaneous three times a day before meals  insulin lispro (HumaLOG) corrective regimen sliding scale   SubCutaneous at bedtime  isosorbide   mononitrate ER Tablet (IMDUR) 30 milliGRAM(s) Oral daily  lactobacillus acidophilus 1 Tablet(s) Oral three times a day with meals  metoprolol tartrate 50 milliGRAM(s) Oral two times a day  NIFEdipine XL 60 milliGRAM(s) Oral daily  pantoprazole    Tablet 40 milliGRAM(s) Oral before breakfast  tamsulosin 0.4 milliGRAM(s) Oral at bedtime  tigecycline IVPB      tigecycline IVPB 50 milliGRAM(s) IV Intermittent every 12 hours    MEDICATIONS  (PRN):  dextrose 40% Gel 15 Gram(s) Oral once PRN Blood Glucose LESS THAN 70 milliGRAM(s)/deciliter  diphenhydrAMINE 25 milliGRAM(s) Oral every 6 hours PRN Rash and/or Itching  glucagon  Injectable 1 milliGRAM(s) IntraMuscular once PRN Glucose LESS THAN 70 milligrams/deciliter  hydrocortisone 1% Cream 1 Application(s) Topical two times a day PRN Rash and/or Itching  ondansetron Injectable 4 milliGRAM(s) IV Push every 6 hours PRN Nausea  simethicone 80 milliGRAM(s) Chew three times a day PRN Gas    T(C): 37.2 (12-03-18 @ 09:43), Max: 37.2 (12-03-18 @ 09:43)  HR: 76 (12-03-18 @ 09:43) (69 - 79)  BP: 119/59 (12-03-18 @ 09:43) (112/68 - 155/70)  RR: 18 (12-03-18 @ 09:43)  SpO2: 97% (12-03-18 @ 09:43)  Wt(kg): --  I&O's Detail    02 Dec 2018 07:01  -  03 Dec 2018 07:00  --------------------------------------------------------  IN:    Oral Fluid: 120 mL  Total IN: 120 mL    OUT:    Voided: 1 mL  Total OUT: 1 mL    Total NET: 119 mL          PHYSICAL EXAM:  General: NAD, Rt neck dialysis catheter  Respiratory: b/l air entry  Cardiovascular: S1 S2  Gastrointestinal: soft  Extremities:  edema, PICC                     LABORATORY:                        8.5    10.76 )-----------( 334      ( 02 Dec 2018 09:46 )             28.4     12-02    136  |  98  |  25<H>  ----------------------------<  163<H>  3.7   |  29  |  5.60<H>    Ca    8.3<L>      02 Dec 2018 09:46      Sodium, Serum: 136 mmol/L (12-02 @ 09:46)    Potassium, Serum: 3.7 mmol/L (12-02 @ 09:46)    Hemoglobin: 8.5 g/dL (12-02 @ 09:46)  Hemoglobin: 8.2 g/dL (12-01 @ 09:01)    Creatinine, Serum 5.60 (12-02 @ 09:46)  Creatinine, Serum 4.10 (12-01 @ 09:01)

## 2018-12-03 NOTE — DIETITIAN INITIAL EVALUATION ADULT. - PERTINENT LABORATORY DATA
(12/2) WBC 10.76, Hgb 8.5, Hct 28.4, BUN 25, Creat 5.6, gluc 163, Ca 8.3, GFR 7 (12/1)ALb 1.7 (10/30)HgbA1c 9.6%

## 2018-12-03 NOTE — DIETITIAN INITIAL EVALUATION ADULT. - OTHER INFO
Pt seen secondary LOS >7days. Abdominal pain on admission, dx hepatic abscess, treated conservatively w/ antibiotics. Consistent Carb, Renal diet rx w/ nepro BID. Pt tolerating meals well w/  fair/fluctuating appetite/po intake; % per records. Small soft BM 12/3 per records. Hx ESRD on HD. Per labs HgbA1c 9.6% (10/30) c/w uncontrolled DM. ON 5 units lantus qhs w/ ss covg. Pt seen secondary LOS >7days. Abdominal pain on admission, dx hepatic abscess, treated conservatively w/ antibiotics. Consistent Carb, Renal diet rx w/ nepro BID. Pt tolerating meals well w/  fair/fluctuating appetite/po intake; % per records. Small soft BM 12/3 per records.Loose BM x 3 12/2. Bowel regime d/c. GI aware. Abdominal pain improved. Hx ESRD on HD & uncontrolled DM. Per labs HgbA1c 9.6% (10/30). ON 5 units lantus qhs w/ ss covg. Pt seen secondary to LOS >7days. A+Ox4 during visit; interpretor phoned used as pt does not speak english. Abdominal pain on admission, dx hepatic abscess, treated conservatively w/ antibiotics. Consistent Carb, Renal diet rx w/ nepro BID. Pt tolerating meals well w/  fair/fluctuating appetite/po intake; % per records. Small soft BM 12/3 per records.Loose BM x 3 12/2. Bowel regime d/c per GI. Abdominal pain improved. Hx ESRD on HD & uncontrolled DM. Per labs HgbA1c 9.6% (10/30). ON 5 units lantus qhs w/ ss covg. Per pt lives with daughter in law who prepares all meals. Watches sugar. Follows dietitian in outpatinet HD. Pt seen secondary to LOS >7days. A+Ox4. Seen during dialysis treatment.  phoned used as pt does not speak english. Abdominal pain on admission, dx hepatic abscess, treated conservatively w/ antibiotics. Consistent Carb, Renal diet rx w/ nepro BID. Pt tolerating meals well however  fair/fluctuating appetite/po intake; % per records. Good intake of nepro supplemetn. Small soft BM 12/3 per records. Loose BM x 3 12/2. Bowel regime d/c per GI. Abdominal pain improved. Hx ESRD on HD & uncontrolled DM. Per labs HgbA1c 9.6% (10/30). ON 5 units lantus qhs w/ ss covg. Per pt lives with family; daughter in law prepares all meals. Watches sugar however compliance questionable. Follows dietitian outpatient during HD. Declined DM and Renal nutrition therapy.

## 2018-12-03 NOTE — DIETITIAN INITIAL EVALUATION ADULT. - NS AS NUTRI INTERV MEALS SNACK
Current diet appopriate and well tolerated. Encourage consumption of nepro nutritional supplements to meet estimated energy/pro needs. Current diet appropriate and well tolerated. Encourage consumption of HBV protein sources & nepro nutritional supplements to meet estimated energy/pro needs.

## 2018-12-04 PROCEDURE — 99232 SBSQ HOSP IP/OBS MODERATE 35: CPT

## 2018-12-04 RX ORDER — TIGECYCLINE 50 MG/5ML
50 INJECTION, POWDER, LYOPHILIZED, FOR SOLUTION INTRAVENOUS
Qty: 3400 | Refills: 0 | OUTPATIENT
Start: 2018-12-04 | End: 2019-01-06

## 2018-12-04 RX ADMIN — ATORVASTATIN CALCIUM 80 MILLIGRAM(S): 80 TABLET, FILM COATED ORAL at 22:21

## 2018-12-04 RX ADMIN — TIGECYCLINE 105 MILLIGRAM(S): 50 INJECTION, POWDER, LYOPHILIZED, FOR SOLUTION INTRAVENOUS at 01:01

## 2018-12-04 RX ADMIN — TIGECYCLINE 105 MILLIGRAM(S): 50 INJECTION, POWDER, LYOPHILIZED, FOR SOLUTION INTRAVENOUS at 13:24

## 2018-12-04 RX ADMIN — Medication 1 MILLIGRAM(S): at 13:23

## 2018-12-04 RX ADMIN — Medication 1 TABLET(S): at 09:13

## 2018-12-04 RX ADMIN — CLOPIDOGREL BISULFATE 75 MILLIGRAM(S): 75 TABLET, FILM COATED ORAL at 13:23

## 2018-12-04 RX ADMIN — GABAPENTIN 300 MILLIGRAM(S): 400 CAPSULE ORAL at 05:36

## 2018-12-04 RX ADMIN — Medication 1: at 13:29

## 2018-12-04 RX ADMIN — GABAPENTIN 300 MILLIGRAM(S): 400 CAPSULE ORAL at 17:29

## 2018-12-04 RX ADMIN — Medication 60 MILLIGRAM(S): at 05:36

## 2018-12-04 RX ADMIN — Medication 50 MILLIGRAM(S): at 17:29

## 2018-12-04 RX ADMIN — PANTOPRAZOLE SODIUM 40 MILLIGRAM(S): 20 TABLET, DELAYED RELEASE ORAL at 05:37

## 2018-12-04 RX ADMIN — Medication 1 TABLET(S): at 17:29

## 2018-12-04 RX ADMIN — Medication 25 MILLIGRAM(S): at 00:17

## 2018-12-04 RX ADMIN — CHOLESTYRAMINE 4 GRAM(S): 4 POWDER, FOR SUSPENSION ORAL at 09:12

## 2018-12-04 RX ADMIN — Medication 1 TABLET(S): at 13:23

## 2018-12-04 RX ADMIN — Medication 81 MILLIGRAM(S): at 13:22

## 2018-12-04 RX ADMIN — CHLORHEXIDINE GLUCONATE 1 APPLICATION(S): 213 SOLUTION TOPICAL at 13:29

## 2018-12-04 RX ADMIN — Medication 50 MILLIGRAM(S): at 05:37

## 2018-12-04 RX ADMIN — TAMSULOSIN HYDROCHLORIDE 0.4 MILLIGRAM(S): 0.4 CAPSULE ORAL at 22:21

## 2018-12-04 RX ADMIN — INSULIN GLARGINE 5 UNIT(S): 100 INJECTION, SOLUTION SUBCUTANEOUS at 22:21

## 2018-12-04 RX ADMIN — Medication 2: at 17:29

## 2018-12-04 RX ADMIN — ISOSORBIDE MONONITRATE 30 MILLIGRAM(S): 60 TABLET, EXTENDED RELEASE ORAL at 13:23

## 2018-12-04 NOTE — PROGRESS NOTE ADULT - SUBJECTIVE AND OBJECTIVE BOX
CLAUDIAIRENE  68y  Female    Patient is a 68y old  Female who presents with a chief complaint of abdominal pain (04 Dec 2018 11:53)      comfortable today.   denies any pain today.       PAST MEDICAL & SURGICAL HISTORY:  ESRD (end stage renal disease) on dialysis: MWF  CAD (coronary artery disease): s/p stent in 2007  Diabetes  Myocardial infarction  Hypertension  H/O: hysterectomy          PHYSICAL EXAM:    T(C): 36.7 (12-04-18 @ 13:00), Max: 37.4 (12-03-18 @ 21:36)  HR: 67 (12-04-18 @ 13:00) (67 - 74)  BP: 152/74 (12-04-18 @ 13:00) (149/71 - 177/78)  RR: 16 (12-04-18 @ 13:00) (16 - 17)  SpO2: 96% (12-04-18 @ 13:00) (94% - 96%)  Wt(kg): --    I&O's Detail    03 Dec 2018 07:01  -  04 Dec 2018 07:00  --------------------------------------------------------  IN:  Total IN: 0 mL    OUT:    Other: 2000 mL  Total OUT: 2000 mL    Total NET: -2000 mL          Respiratory: clear anteriorly, decreased BS at bases  Cardiovascular: S1 S2  Gastrointestinal: soft NT ND +BS  Extremities: trace edema   Neuro: Awake and alert    MEDICATIONS  (STANDING):  aspirin enteric coated 81 milliGRAM(s) Oral daily  atorvastatin 80 milliGRAM(s) Oral at bedtime  chlorhexidine 2% Cloths 1 Application(s) Topical daily  cholestyramine Powder (Sugar-Free) 4 Gram(s) Oral daily  clopidogrel Tablet 75 milliGRAM(s) Oral daily  dextrose 5%. 1000 milliLiter(s) (50 mL/Hr) IV Continuous <Continuous>  dextrose 50% Injectable 12.5 Gram(s) IV Push once  dextrose 50% Injectable 25 Gram(s) IV Push once  dextrose 50% Injectable 25 Gram(s) IV Push once  epoetin analilia Injectable 97527 Unit(s) IV Push <User Schedule>  famotidine    Tablet 20 milliGRAM(s) Oral once  folic acid 1 milliGRAM(s) Oral daily  gabapentin 300 milliGRAM(s) Oral two times a day  insulin glargine Injectable (LANTUS) 5 Unit(s) SubCutaneous at bedtime  insulin lispro (HumaLOG) corrective regimen sliding scale   SubCutaneous three times a day before meals  insulin lispro (HumaLOG) corrective regimen sliding scale   SubCutaneous at bedtime  isosorbide   mononitrate ER Tablet (IMDUR) 30 milliGRAM(s) Oral daily  lactobacillus acidophilus 1 Tablet(s) Oral three times a day with meals  metoprolol tartrate 50 milliGRAM(s) Oral two times a day  NIFEdipine XL 60 milliGRAM(s) Oral daily  pantoprazole    Tablet 40 milliGRAM(s) Oral before breakfast  tamsulosin 0.4 milliGRAM(s) Oral at bedtime  tigecycline IVPB      tigecycline IVPB 50 milliGRAM(s) IV Intermittent every 12 hours    MEDICATIONS  (PRN):  dextrose 40% Gel 15 Gram(s) Oral once PRN Blood Glucose LESS THAN 70 milliGRAM(s)/deciliter  diphenhydrAMINE 25 milliGRAM(s) Oral every 6 hours PRN Rash and/or Itching  glucagon  Injectable 1 milliGRAM(s) IntraMuscular once PRN Glucose LESS THAN 70 milligrams/deciliter  hydrocortisone 1% Cream 1 Application(s) Topical two times a day PRN Rash and/or Itching  ondansetron Injectable 4 milliGRAM(s) IV Push every 6 hours PRN Nausea  simethicone 80 milliGRAM(s) Chew three times a day PRN Gas                            8.4    13.95 )-----------( 374      ( 03 Dec 2018 11:49 )             27.8       12-03    135  |  97  |  48<H>  ----------------------------<  200<H>  3.8   |  28  |  6.60<H>    Ca    8.1<L>      03 Dec 2018 11:49    TPro  7.4  /  Alb  2.1<L>  /  TBili  0.4  /  DBili  x   /  AST  21  /  ALT  14  /  AlkPhos  208<H>  12-03

## 2018-12-04 NOTE — PROGRESS NOTE ADULT - SUBJECTIVE AND OBJECTIVE BOX
pt seen  doing well  no complaints  ICU Vital Signs Last 24 Hrs  T(C): 36.8 (04 Dec 2018 05:41), Max: 37.4 (03 Dec 2018 21:36)  T(F): 98.3 (04 Dec 2018 05:41), Max: 99.4 (03 Dec 2018 21:36)  HR: 73 (04 Dec 2018 05:41) (71 - 74)  BP: 177/78 (04 Dec 2018 05:41) (149/71 - 177/78)  BP(mean): --  ABP: --  ABP(mean): --  RR: 16 (04 Dec 2018 05:41) (16 - 20)  SpO2: 94% (04 Dec 2018 05:41) (94% - 97%)  gen-NAD  resp-clear  abd-soft NT/ND                          8.4    13.95 )-----------( 374      ( 03 Dec 2018 11:49 )             27.8   12-03    135  |  97  |  48<H>  ----------------------------<  200<H>  3.8   |  28  |  6.60<H>    Ca    8.1<L>      03 Dec 2018 11:49    TPro  7.4  /  Alb  2.1<L>  /  TBili  0.4  /  DBili  x   /  AST  21  /  ALT  14  /  AlkPhos  208<H>  12-03

## 2018-12-04 NOTE — PROGRESS NOTE ADULT - ATTENDING COMMENTS
Ed Solis MD  [phone]
Advanced care planning was discussed with patient and family.  Advanced care planning forms were reviewed and discussed.  Risks, benefits and alternatives of gastroenterologic procedures were discussed in detail and all questions were answered.    30 minutes spent.
Advanced care planning was discussed with patient and family.  Advanced care planning forms were reviewed and discussed.  Risks, benefits and alternatives of gastroenterologic procedures were discussed in detail and all questions were answered.    30 minutes spent.
Note written by attending, see above.  Time spent: 45min. More than 50% of the visit was spent counseling the patient / pt's family on her condition - likely liver abscesses, urinary retention, ertapenem, PICC line.
Note written by attending, see above.  Time spent: 50min. More than 50% of the visit was spent counseling the patient / pt's family on her condition - likely liver abscesses, urinary retention.
Note written by attending, see above.  Time spent: 45min. More than 50% of the visit was spent counseling the patient / pt's family on her condition - likely liver abscesses, urinary retention, ertapenem, PICC line.
Note written by attending, see above.  Time spent: 45min. More than 50% of the visit was spent counseling the patient / pt's family on her condition - likely liver abscesses, urinary retention, ertapenem, PICC line.    Dispo: has [facility], but insurance won't be cleared until 12/4 when she can go to [facility]

## 2018-12-04 NOTE — PROGRESS NOTE ADULT - SUBJECTIVE AND OBJECTIVE BOX
INTERVAL HPI/OVERNIGHT EVENTS:  pt seen and examined  denies abd pain  per overnight rn no loose stools had one small formed bm, no s/s overt gib  afebrile overnight no new labs to assess    MEDICATIONS  (STANDING):  aspirin enteric coated 81 milliGRAM(s) Oral daily  atorvastatin 80 milliGRAM(s) Oral at bedtime  chlorhexidine 2% Cloths 1 Application(s) Topical daily  cholestyramine Powder (Sugar-Free) 4 Gram(s) Oral daily  clopidogrel Tablet 75 milliGRAM(s) Oral daily  dextrose 5%. 1000 milliLiter(s) (50 mL/Hr) IV Continuous <Continuous>  dextrose 50% Injectable 12.5 Gram(s) IV Push once  dextrose 50% Injectable 25 Gram(s) IV Push once  dextrose 50% Injectable 25 Gram(s) IV Push once  epoetin analilia Injectable 21233 Unit(s) IV Push <User Schedule>  famotidine    Tablet 20 milliGRAM(s) Oral once  folic acid 1 milliGRAM(s) Oral daily  gabapentin 300 milliGRAM(s) Oral two times a day  insulin glargine Injectable (LANTUS) 5 Unit(s) SubCutaneous at bedtime  insulin lispro (HumaLOG) corrective regimen sliding scale   SubCutaneous three times a day before meals  insulin lispro (HumaLOG) corrective regimen sliding scale   SubCutaneous at bedtime  isosorbide   mononitrate ER Tablet (IMDUR) 30 milliGRAM(s) Oral daily  lactobacillus acidophilus 1 Tablet(s) Oral three times a day with meals  metoprolol tartrate 50 milliGRAM(s) Oral two times a day  NIFEdipine XL 60 milliGRAM(s) Oral daily  pantoprazole    Tablet 40 milliGRAM(s) Oral before breakfast  tamsulosin 0.4 milliGRAM(s) Oral at bedtime  tigecycline IVPB      tigecycline IVPB 50 milliGRAM(s) IV Intermittent every 12 hours    MEDICATIONS  (PRN):  dextrose 40% Gel 15 Gram(s) Oral once PRN Blood Glucose LESS THAN 70 milliGRAM(s)/deciliter  diphenhydrAMINE 25 milliGRAM(s) Oral every 6 hours PRN Rash and/or Itching  glucagon  Injectable 1 milliGRAM(s) IntraMuscular once PRN Glucose LESS THAN 70 milligrams/deciliter  hydrocortisone 1% Cream 1 Application(s) Topical two times a day PRN Rash and/or Itching  ondansetron Injectable 4 milliGRAM(s) IV Push every 6 hours PRN Nausea  simethicone 80 milliGRAM(s) Chew three times a day PRN Gas      Allergies    ertapenem (Urticaria)  Purell (Rash)    Intolerances        Review of Systems:    General:  No wt loss, fevers, chills, night sweats, fatigue   Eyes:  Good vision, no reported pain  ENT:  No sore throat, pain, runny nose, dysphagia  CV:  No pain, palpitations, hypo/hypertension  Resp:  No dyspnea, cough, tachypnea, wheezing  GI:  No pain, No nausea, No vomiting, No diarrhea, No constipation, No weight loss, No fever, No pruritis, No rectal bleeding, No melena, No dysphagia  :  No pain, bleeding, incontinence, nocturia  Muscle:  No pain, weakness  Neuro:  No weakness, tingling, memory problems  Psych:  No fatigue, insomnia, mood problems, depression  Endocrine:  No polyuria, polydypsia, cold/heat intolerance  Heme:  No petechiae, ecchymosis, easy bruisability  Skin:  No rash, tattoos, scars, edema      Vital Signs Last 24 Hrs  T(C): 36.8 (04 Dec 2018 05:41), Max: 37.4 (03 Dec 2018 21:36)  T(F): 98.3 (04 Dec 2018 05:41), Max: 99.4 (03 Dec 2018 21:36)  HR: 73 (04 Dec 2018 05:41) (71 - 77)  BP: 177/78 (04 Dec 2018 05:41) (119/59 - 177/78)  BP(mean): --  RR: 16 (04 Dec 2018 05:41) (16 - 20)  SpO2: 94% (04 Dec 2018 05:41) (94% - 97%)    PHYSICAL EXAM:    Constitutional: NAD  HEENT:  NC/AT  Chest: dec bs  Cardiovascular:  Regular rhythm, S1, S2  Abdomen: obese abd soft nt mild dt  Extremities:  no edema  Skin:  scattered ecchymosis  Neuro/Psych:  Awake alert responds appropriately    LABS:                        8.4    13.95 )-----------( 374      ( 03 Dec 2018 11:49 )             27.8     12-03    135  |  97  |  48<H>  ----------------------------<  200<H>  3.8   |  28  |  6.60<H>    Ca    8.1<L>      03 Dec 2018 11:49    TPro  7.4  /  Alb  2.1<L>  /  TBili  0.4  /  DBili  x   /  AST  21  /  ALT  14  /  AlkPhos  208<H>  12-03          RADIOLOGY & ADDITIONAL TESTS:

## 2018-12-04 NOTE — PROGRESS NOTE ADULT - SUBJECTIVE AND OBJECTIVE BOX
Patient is a 68y old  Female who presents with a chief complaint of abdominal pain (04 Dec 2018 08:48)        HPI:  this si a 70 y/o f with pmhx of htn, hld, obesity, HD dependant, DM 2 on basilglar, s/p cholecystotomy tube removal with biliary stent placement at [facility] p/w abdominal pain to ER. She developed abd pain while having HD and they had to stop that and send her to ED. Pt narrates that her pain is 4/10 in lower abdomen and non radiating. she was found ot have multiple liver lesion concening for liver abscesses and lipas eof 489 concerning for acute pancreatitis, GI was called for evaluation. pt ha sno wbc elevationan dno fever  acc by daughter in law (26 Nov 2018 20:39)      SUBJECTIVE & OBJECTIVE: Pt seen and examined at bedside.     PHYSICAL EXAM:  T(C): 36.8 (12-04-18 @ 05:41), Max: 37.4 (12-03-18 @ 21:36)  HR: 73 (12-04-18 @ 05:41) (71 - 74)  BP: 177/78 (12-04-18 @ 05:41) (149/71 - 177/78)  RR: 16 (12-04-18 @ 05:41) (16 - 20)  SpO2: 94% (12-04-18 @ 05:41) (94% - 97%)  Wt(kg): --   GENERAL: NAD, well-groomed, well-developed  HEAD:  Atraumatic, Normocephalic  EYES: EOMI, PERRLA, conjunctiva and sclera clear  ENMT: Moist mucous membranes  NECK: Supple, No JVD  NERVOUS SYSTEM:  Alert & Oriented X3,  CHEST/LUNG: decrease air entry at the bases   HEART: Regular rate and rhythm; No murmurs, rubs, or gallops  ABDOMEN: Soft, Nontender, Nondistended; Bowel sounds present  EXTREMITIES:  2+ Peripheral Pulses, No clubbing, cyanosis, or edema        MEDICATIONS  (STANDING):  aspirin enteric coated 81 milliGRAM(s) Oral daily  atorvastatin 80 milliGRAM(s) Oral at bedtime  chlorhexidine 2% Cloths 1 Application(s) Topical daily  cholestyramine Powder (Sugar-Free) 4 Gram(s) Oral daily  clopidogrel Tablet 75 milliGRAM(s) Oral daily  dextrose 5%. 1000 milliLiter(s) (50 mL/Hr) IV Continuous <Continuous>  dextrose 50% Injectable 12.5 Gram(s) IV Push once  dextrose 50% Injectable 25 Gram(s) IV Push once  dextrose 50% Injectable 25 Gram(s) IV Push once  epoetin analilia Injectable 20648 Unit(s) IV Push <User Schedule>  famotidine    Tablet 20 milliGRAM(s) Oral once  folic acid 1 milliGRAM(s) Oral daily  gabapentin 300 milliGRAM(s) Oral two times a day  insulin glargine Injectable (LANTUS) 5 Unit(s) SubCutaneous at bedtime  insulin lispro (HumaLOG) corrective regimen sliding scale   SubCutaneous three times a day before meals  insulin lispro (HumaLOG) corrective regimen sliding scale   SubCutaneous at bedtime  isosorbide   mononitrate ER Tablet (IMDUR) 30 milliGRAM(s) Oral daily  lactobacillus acidophilus 1 Tablet(s) Oral three times a day with meals  metoprolol tartrate 50 milliGRAM(s) Oral two times a day  NIFEdipine XL 60 milliGRAM(s) Oral daily  pantoprazole    Tablet 40 milliGRAM(s) Oral before breakfast  tamsulosin 0.4 milliGRAM(s) Oral at bedtime  tigecycline IVPB      tigecycline IVPB 50 milliGRAM(s) IV Intermittent every 12 hours    MEDICATIONS  (PRN):  dextrose 40% Gel 15 Gram(s) Oral once PRN Blood Glucose LESS THAN 70 milliGRAM(s)/deciliter  diphenhydrAMINE 25 milliGRAM(s) Oral every 6 hours PRN Rash and/or Itching  glucagon  Injectable 1 milliGRAM(s) IntraMuscular once PRN Glucose LESS THAN 70 milligrams/deciliter  hydrocortisone 1% Cream 1 Application(s) Topical two times a day PRN Rash and/or Itching  ondansetron Injectable 4 milliGRAM(s) IV Push every 6 hours PRN Nausea  simethicone 80 milliGRAM(s) Chew three times a day PRN Gas      LABS:                        8.4    13.95 )-----------( 374      ( 03 Dec 2018 11:49 )             27.8     12-03    135  |  97  |  48<H>  ----------------------------<  200<H>  3.8   |  28  |  6.60<H>    Ca    8.1<L>      03 Dec 2018 11:49    TPro  7.4  /  Alb  2.1<L>  /  TBili  0.4  /  DBili  x   /  AST  21  /  ALT  14  /  AlkPhos  208<H>  12-03          CAPILLARY BLOOD GLUCOSE      POCT Blood Glucose.: 147 mg/dL (04 Dec 2018 07:44)  POCT Blood Glucose.: 199 mg/dL (03 Dec 2018 22:01)  POCT Blood Glucose.: 195 mg/dL (03 Dec 2018 16:44)  POCT Blood Glucose.: 152 mg/dL (03 Dec 2018 12:07)      CAPILLARY BLOOD GLUCOSE      POCT Blood Glucose.: 147 mg/dL (04 Dec 2018 07:44)  POCT Blood Glucose.: 199 mg/dL (03 Dec 2018 22:01)  POCT Blood Glucose.: 195 mg/dL (03 Dec 2018 16:44)  POCT Blood Glucose.: 152 mg/dL (03 Dec 2018 12:07)    CAPILLARY BLOOD GLUCOSE      POCT Blood Glucose.: 147 mg/dL (04 Dec 2018 07:44)            RECENT CULTURES:      RADIOLOGY & ADDITIONAL TESTS:                        DVT/GI ppx  Discussed with pt @ bedside

## 2018-12-04 NOTE — PROGRESS NOTE ADULT - PROBLEM SELECTOR PLAN 3
resolved per nursing  bowel regimen dcd  cont bacid, cont questran  if recurs send stool studies  monitor abd exam/stool output

## 2018-12-04 NOTE — PROGRESS NOTE ADULT - SUBJECTIVE AND OBJECTIVE BOX
[facility], Division of Infectious Diseases  DEB Barbosa A. Lee  [phone]    Name: IRENE TAYLOR  Age: 68y  Gender: Female  MRN: [id number]    Interval History--  Notes reviewed. No new issues. Tolerating tigecycline thus far.     Past Medical History--  ESRD (end stage renal disease) on dialysis  CAD (coronary artery disease)  Diabetes  CRF (chronic renal failure)  Hypertension  Pneumonia  Myocardial infarction  Hypertension  Diabetes  H/O: hysterectomy      For details regarding the patient's social history, family history, and other miscellaneous elements, please refer the initial infectious diseases consultation and/or the admitting history and physical examination for this admission.    Allergies    ertapenem (Urticaria)  Purell (Rash)    Intolerances        Medications--  Antibiotics:  tigecycline IVPB      tigecycline IVPB 50 milliGRAM(s) IV Intermittent every 12 hours    Immunologic:  epoetin anaillia Injectable 40651 Unit(s) IV Push <User Schedule>    Other:  aspirin enteric coated  atorvastatin  chlorhexidine 2% Cloths  cholestyramine Powder (Sugar-Free)  clopidogrel Tablet  dextrose 40% Gel PRN  dextrose 5%.  dextrose 50% Injectable  dextrose 50% Injectable  dextrose 50% Injectable  diphenhydrAMINE PRN  famotidine    Tablet  folic acid  gabapentin  glucagon  Injectable PRN  hydrocortisone 1% Cream PRN  insulin glargine Injectable (LANTUS)  insulin lispro (HumaLOG) corrective regimen sliding scale  insulin lispro (HumaLOG) corrective regimen sliding scale  isosorbide   mononitrate ER Tablet (IMDUR)  lactobacillus acidophilus  metoprolol tartrate  NIFEdipine XL  ondansetron Injectable PRN  pantoprazole    Tablet  simethicone PRN  tamsulosin      Review of Systems--  A 10-point review of systems was obtained.   Review of systems otherwise negative except as previously noted.    Physical Examination--  Vital Signs: T(F): 98.3 (12-04-18 @ 05:41), Max: 99.4 (12-03-18 @ 21:36)  HR: 73 (12-04-18 @ 05:41)  BP: 177/78 (12-04-18 @ 05:41)  RR: 16 (12-04-18 @ 05:41)  SpO2: 94% (12-04-18 @ 05:41)  Wt(kg): --  HEENT: AT/NC. Anicteric. Conjunctiva pink and moist. Oropharynx clear.  Neck: Not rigid. No sense of mass.  Nodes: None palpable.  Chest: HD cath C/D/I  Lungs: Diminished breath sounds bilaterally without rales, wheezing or rhonchi  Heart: Regular rate and rhythm. No Murmur. No rub. No gallop. No palpable thrill.  Abdomen: Bowel sounds present and normoactive. Soft. Nondistended. Nontender. No sense of mass. No organomegaly.  Extremities: No cyanosis or clubbing. 1+ edema.   Skin: Warm. Dry. Good turgor. +rash. No vasculitic stigmata.  Psychiatric: Appropriate affect and mood for situation.         Laboratory Studies--  CBC                        8.4    13.95 )-----------( 374      ( 03 Dec 2018 11:49 )             27.8       Chemistries  12-03    135  |  97  |  48<H>  ----------------------------<  200<H>  3.8   |  28  |  6.60<H>    Ca    8.1<L>      03 Dec 2018 11:49    TPro  7.4  /  Alb  2.1<L>  /  TBili  0.4  /  DBili  x   /  AST  21  /  ALT  14  /  AlkPhos  208<H>  12-03      Culture Data    Culture - Urine (collected 29 Nov 2018 00:10)  Source: .Urine Catheterized  Final Report (29 Nov 2018 22:43):    No growth

## 2018-12-05 LAB
ANION GAP SERPL CALC-SCNC: 8 MMOL/L — SIGNIFICANT CHANGE UP (ref 5–17)
BUN SERPL-MCNC: 50 MG/DL — HIGH (ref 7–23)
CALCIUM SERPL-MCNC: 7.4 MG/DL — LOW (ref 8.5–10.1)
CHLORIDE SERPL-SCNC: 96 MMOL/L — SIGNIFICANT CHANGE UP (ref 96–108)
CO2 SERPL-SCNC: 30 MMOL/L — SIGNIFICANT CHANGE UP (ref 22–31)
CREAT SERPL-MCNC: 5 MG/DL — HIGH (ref 0.5–1.3)
GLUCOSE SERPL-MCNC: 211 MG/DL — HIGH (ref 70–99)
HCT VFR BLD CALC: 28.1 % — LOW (ref 34.5–45)
HGB BLD-MCNC: 8.6 G/DL — LOW (ref 11.5–15.5)
MCHC RBC-ENTMCNC: 27 PG — SIGNIFICANT CHANGE UP (ref 27–34)
MCHC RBC-ENTMCNC: 30.6 GM/DL — LOW (ref 32–36)
MCV RBC AUTO: 88.4 FL — SIGNIFICANT CHANGE UP (ref 80–100)
NRBC # BLD: 0 /100 WBCS — SIGNIFICANT CHANGE UP (ref 0–0)
PLATELET # BLD AUTO: 359 K/UL — SIGNIFICANT CHANGE UP (ref 150–400)
POTASSIUM SERPL-MCNC: 4 MMOL/L — SIGNIFICANT CHANGE UP (ref 3.5–5.3)
POTASSIUM SERPL-SCNC: 4 MMOL/L — SIGNIFICANT CHANGE UP (ref 3.5–5.3)
RBC # BLD: 3.18 M/UL — LOW (ref 3.8–5.2)
RBC # FLD: 17.3 % — HIGH (ref 10.3–14.5)
SODIUM SERPL-SCNC: 134 MMOL/L — LOW (ref 135–145)
WBC # BLD: 11.2 K/UL — HIGH (ref 3.8–10.5)
WBC # FLD AUTO: 11.2 K/UL — HIGH (ref 3.8–10.5)

## 2018-12-05 PROCEDURE — 99232 SBSQ HOSP IP/OBS MODERATE 35: CPT

## 2018-12-05 RX ADMIN — CHOLESTYRAMINE 4 GRAM(S): 4 POWDER, FOR SUSPENSION ORAL at 08:51

## 2018-12-05 RX ADMIN — ATORVASTATIN CALCIUM 80 MILLIGRAM(S): 80 TABLET, FILM COATED ORAL at 21:14

## 2018-12-05 RX ADMIN — TIGECYCLINE 105 MILLIGRAM(S): 50 INJECTION, POWDER, LYOPHILIZED, FOR SOLUTION INTRAVENOUS at 13:38

## 2018-12-05 RX ADMIN — ERYTHROPOIETIN 10000 UNIT(S): 10000 INJECTION, SOLUTION INTRAVENOUS; SUBCUTANEOUS at 10:24

## 2018-12-05 RX ADMIN — PANTOPRAZOLE SODIUM 40 MILLIGRAM(S): 20 TABLET, DELAYED RELEASE ORAL at 06:59

## 2018-12-05 RX ADMIN — ISOSORBIDE MONONITRATE 30 MILLIGRAM(S): 60 TABLET, EXTENDED RELEASE ORAL at 13:22

## 2018-12-05 RX ADMIN — CLOPIDOGREL BISULFATE 75 MILLIGRAM(S): 75 TABLET, FILM COATED ORAL at 13:20

## 2018-12-05 RX ADMIN — Medication 81 MILLIGRAM(S): at 13:20

## 2018-12-05 RX ADMIN — Medication 25 MILLIGRAM(S): at 13:39

## 2018-12-05 RX ADMIN — Medication 1 MILLIGRAM(S): at 13:21

## 2018-12-05 RX ADMIN — GABAPENTIN 300 MILLIGRAM(S): 400 CAPSULE ORAL at 06:59

## 2018-12-05 RX ADMIN — CHLORHEXIDINE GLUCONATE 1 APPLICATION(S): 213 SOLUTION TOPICAL at 13:33

## 2018-12-05 RX ADMIN — Medication 1 TABLET(S): at 17:29

## 2018-12-05 RX ADMIN — Medication 1 TABLET(S): at 08:50

## 2018-12-05 RX ADMIN — Medication 50 MILLIGRAM(S): at 06:59

## 2018-12-05 RX ADMIN — TIGECYCLINE 105 MILLIGRAM(S): 50 INJECTION, POWDER, LYOPHILIZED, FOR SOLUTION INTRAVENOUS at 01:24

## 2018-12-05 RX ADMIN — Medication 2: at 17:27

## 2018-12-05 RX ADMIN — Medication 1 TABLET(S): at 13:23

## 2018-12-05 RX ADMIN — INSULIN GLARGINE 5 UNIT(S): 100 INJECTION, SOLUTION SUBCUTANEOUS at 21:53

## 2018-12-05 RX ADMIN — GABAPENTIN 300 MILLIGRAM(S): 400 CAPSULE ORAL at 17:29

## 2018-12-05 RX ADMIN — Medication 50 MILLIGRAM(S): at 17:28

## 2018-12-05 RX ADMIN — TAMSULOSIN HYDROCHLORIDE 0.4 MILLIGRAM(S): 0.4 CAPSULE ORAL at 21:14

## 2018-12-05 RX ADMIN — Medication 60 MILLIGRAM(S): at 06:59

## 2018-12-05 NOTE — PROGRESS NOTE ADULT - SUBJECTIVE AND OBJECTIVE BOX
Patient is a 68y old  Female who presents with a chief complaint of abdominal pain (05 Dec 2018 11:16)        HPI:  this si a 68 y/o f with pmhx of htn, hld, obesity, HD dependant, DM 2 on basilglar, s/p cholecystotomy tube removal with biliary stent placement at [facility] p/w abdominal pain to ER. She developed abd pain while having HD and they had to stop that and send her to ED. Pt narrates that her pain is 4/10 in lower abdomen and non radiating. she was found ot have multiple liver lesion concening for liver abscesses and lipas eof 489 concerning for acute pancreatitis, GI was called for evaluation. pt ha sno wbc elevationan dno fever  acc by daughter in law (26 Nov 2018 20:39)      SUBJECTIVE & OBJECTIVE: Pt seen and examined at bedside. no acute complaints     PHYSICAL EXAM:  T(C): 37 (12-05-18 @ 09:30), Max: 37 (12-05-18 @ 09:30)  HR: 67 (12-05-18 @ 09:30) (67 - 72)  BP: 165/73 (12-05-18 @ 09:30) (147/73 - 165/73)  RR: 18 (12-05-18 @ 09:30) (16 - 18)  SpO2: 97% (12-05-18 @ 09:30) (93% - 97%)  Wt(kg): --   GENERAL: NAD, well-groomed, well-developed  HEAD:  Atraumatic, Normocephalic  EYES: EOMI, PERRLA, conjunctiva and sclera clear  ENMT: Moist mucous membranes  NECK: Supple, No JVD  NERVOUS SYSTEM:  Alert & Oriented X3,   CHEST/LUNG: Clear to auscultation bilaterally; No rales, rhonchi, wheezing, or rubs  HEART: Regular rate and rhythm; No murmurs, rubs, or gallops  ABDOMEN: Soft, Nontender, Nondistended; Bowel sounds present  EXTREMITIES:  2+ Peripheral Pulses, No clubbing, cyanosis, or edema        MEDICATIONS  (STANDING):  aspirin enteric coated 81 milliGRAM(s) Oral daily  atorvastatin 80 milliGRAM(s) Oral at bedtime  chlorhexidine 2% Cloths 1 Application(s) Topical daily  cholestyramine Powder (Sugar-Free) 4 Gram(s) Oral daily  clopidogrel Tablet 75 milliGRAM(s) Oral daily  dextrose 5%. 1000 milliLiter(s) (50 mL/Hr) IV Continuous <Continuous>  dextrose 50% Injectable 12.5 Gram(s) IV Push once  dextrose 50% Injectable 25 Gram(s) IV Push once  dextrose 50% Injectable 25 Gram(s) IV Push once  epoetin analilia Injectable 44953 Unit(s) IV Push <User Schedule>  famotidine    Tablet 20 milliGRAM(s) Oral once  folic acid 1 milliGRAM(s) Oral daily  gabapentin 300 milliGRAM(s) Oral two times a day  insulin glargine Injectable (LANTUS) 5 Unit(s) SubCutaneous at bedtime  insulin lispro (HumaLOG) corrective regimen sliding scale   SubCutaneous three times a day before meals  insulin lispro (HumaLOG) corrective regimen sliding scale   SubCutaneous at bedtime  isosorbide   mononitrate ER Tablet (IMDUR) 30 milliGRAM(s) Oral daily  lactobacillus acidophilus 1 Tablet(s) Oral three times a day with meals  metoprolol tartrate 50 milliGRAM(s) Oral two times a day  NIFEdipine XL 60 milliGRAM(s) Oral daily  pantoprazole    Tablet 40 milliGRAM(s) Oral before breakfast  tamsulosin 0.4 milliGRAM(s) Oral at bedtime  tigecycline IVPB      tigecycline IVPB 50 milliGRAM(s) IV Intermittent every 12 hours    MEDICATIONS  (PRN):  dextrose 40% Gel 15 Gram(s) Oral once PRN Blood Glucose LESS THAN 70 milliGRAM(s)/deciliter  diphenhydrAMINE 25 milliGRAM(s) Oral every 6 hours PRN Rash and/or Itching  glucagon  Injectable 1 milliGRAM(s) IntraMuscular once PRN Glucose LESS THAN 70 milligrams/deciliter  hydrocortisone 1% Cream 1 Application(s) Topical two times a day PRN Rash and/or Itching  ondansetron Injectable 4 milliGRAM(s) IV Push every 6 hours PRN Nausea  simethicone 80 milliGRAM(s) Chew three times a day PRN Gas      LABS:                        8.6    11.20 )-----------( 359      ( 05 Dec 2018 10:07 )             28.1     12-05    134<L>  |  96  |  50<H>  ----------------------------<  211<H>  4.0   |  30  |  5.00<H>    Ca    7.4<L>      05 Dec 2018 10:07    TPro  7.4  /  Alb  2.1<L>  /  TBili  0.4  /  DBili  x   /  AST  21  /  ALT  14  /  AlkPhos  208<H>  12-03          CAPILLARY BLOOD GLUCOSE      POCT Blood Glucose.: 139 mg/dL (05 Dec 2018 07:40)  POCT Blood Glucose.: 222 mg/dL (04 Dec 2018 21:08)  POCT Blood Glucose.: 210 mg/dL (04 Dec 2018 17:00)  POCT Blood Glucose.: 165 mg/dL (04 Dec 2018 13:20)  POCT Blood Glucose.: 186 mg/dL (04 Dec 2018 11:19)      CAPILLARY BLOOD GLUCOSE      POCT Blood Glucose.: 139 mg/dL (05 Dec 2018 07:40)  POCT Blood Glucose.: 222 mg/dL (04 Dec 2018 21:08)  POCT Blood Glucose.: 210 mg/dL (04 Dec 2018 17:00)  POCT Blood Glucose.: 165 mg/dL (04 Dec 2018 13:20)  POCT Blood Glucose.: 186 mg/dL (04 Dec 2018 11:19)    CAPILLARY BLOOD GLUCOSE      POCT Blood Glucose.: 139 mg/dL (05 Dec 2018 07:40)            RECENT CULTURES:      RADIOLOGY & ADDITIONAL TESTS:                        DVT/GI ppx  Discussed with pt @ bedside

## 2018-12-05 NOTE — PROGRESS NOTE ADULT - PROBLEM SELECTOR PLAN 3
benadryl  -hydrocortisone cream placed with resolution of the rash  not convicned its from abx Inavxm suspect it from lineage

## 2018-12-05 NOTE — PROGRESS NOTE ADULT - SUBJECTIVE AND OBJECTIVE BOX
Patient is a 68y old  Female who presents with a chief complaint of abdominal pain (05 Dec 2018 11:18)        HPI:  this si a 70 y/o f with pmhx of htn, hld, obesity, HD dependant, DM 2 on basilglar, s/p cholecystotomy tube removal with biliary stent placement at [facility] p/w abdominal pain to ER. She developed abd pain while having HD and they had to stop that and send her to ED. Pt narrates that her pain is 4/10 in lower abdomen and non radiating. she was found ot have multiple liver lesion concening for liver abscesses and lipas eof 489 concerning for acute pancreatitis, GI was called for evaluation. pt ha sno wbc elevationan dno fever  acc by daughter in law (26 Nov 2018 20:39)      SUBJECTIVE & OBJECTIVE: Pt seen and examined at bedside, diffuse rash     PHYSICAL EXAM:  T(C): 37.1 (12-05-18 @ 13:24), Max: 37.1 (12-05-18 @ 13:24)  HR: 69 (12-05-18 @ 13:24) (67 - 73)  BP: 156/67 (12-05-18 @ 13:24) (147/73 - 165/73)  RR: 18 (12-05-18 @ 13:24) (16 - 18)  SpO2: 97% (12-05-18 @ 13:24) (93% - 97%)  Wt(kg): --   GENERAL: NAD, well-groomed, well-developed  HEAD:  Atraumatic, Normocephalic  EYES: EOMI, PERRLA, conjunctiva and sclera clear  ENMT: Moist mucous membranes  NECK: Supple, No JVD  NERVOUS SYSTEM:  Alert & Oriented X3, Motor Strength 5/5 B/L upper and lower extremities; DTRs 2+ intact and symmetric  CHEST/LUNG: Clear to auscultation bilaterally; No rales, rhonchi, wheezing, or rubs  HEART: Regular rate and rhythm; No murmurs, rubs, or gallops  ABDOMEN: Soft, Nontender, Nondistended; Bowel sounds present  EXTREMITIES:  2+ Peripheral Pulses, No clubbing, cyanosis, or edema        MEDICATIONS  (STANDING):  aspirin enteric coated 81 milliGRAM(s) Oral daily  atorvastatin 80 milliGRAM(s) Oral at bedtime  chlorhexidine 2% Cloths 1 Application(s) Topical daily  cholestyramine Powder (Sugar-Free) 4 Gram(s) Oral daily  clopidogrel Tablet 75 milliGRAM(s) Oral daily  dextrose 5%. 1000 milliLiter(s) (50 mL/Hr) IV Continuous <Continuous>  dextrose 50% Injectable 12.5 Gram(s) IV Push once  dextrose 50% Injectable 25 Gram(s) IV Push once  dextrose 50% Injectable 25 Gram(s) IV Push once  epoetin analilia Injectable 03803 Unit(s) IV Push <User Schedule>  famotidine    Tablet 20 milliGRAM(s) Oral once  folic acid 1 milliGRAM(s) Oral daily  gabapentin 300 milliGRAM(s) Oral two times a day  insulin glargine Injectable (LANTUS) 5 Unit(s) SubCutaneous at bedtime  insulin lispro (HumaLOG) corrective regimen sliding scale   SubCutaneous three times a day before meals  insulin lispro (HumaLOG) corrective regimen sliding scale   SubCutaneous at bedtime  isosorbide   mononitrate ER Tablet (IMDUR) 30 milliGRAM(s) Oral daily  lactobacillus acidophilus 1 Tablet(s) Oral three times a day with meals  metoprolol tartrate 50 milliGRAM(s) Oral two times a day  NIFEdipine XL 60 milliGRAM(s) Oral daily  pantoprazole    Tablet 40 milliGRAM(s) Oral before breakfast  tamsulosin 0.4 milliGRAM(s) Oral at bedtime  tigecycline IVPB      tigecycline IVPB 50 milliGRAM(s) IV Intermittent every 12 hours    MEDICATIONS  (PRN):  dextrose 40% Gel 15 Gram(s) Oral once PRN Blood Glucose LESS THAN 70 milliGRAM(s)/deciliter  diphenhydrAMINE 25 milliGRAM(s) Oral every 6 hours PRN Rash and/or Itching  glucagon  Injectable 1 milliGRAM(s) IntraMuscular once PRN Glucose LESS THAN 70 milligrams/deciliter  hydrocortisone 1% Cream 1 Application(s) Topical two times a day PRN Rash and/or Itching  ondansetron Injectable 4 milliGRAM(s) IV Push every 6 hours PRN Nausea  simethicone 80 milliGRAM(s) Chew three times a day PRN Gas      LABS:                        8.6    11.20 )-----------( 359      ( 05 Dec 2018 10:07 )             28.1     12-05    134<L>  |  96  |  50<H>  ----------------------------<  211<H>  4.0   |  30  |  5.00<H>    Ca    7.4<L>      05 Dec 2018 10:07            CAPILLARY BLOOD GLUCOSE      POCT Blood Glucose.: 121 mg/dL (05 Dec 2018 11:59)  POCT Blood Glucose.: 139 mg/dL (05 Dec 2018 07:40)  POCT Blood Glucose.: 222 mg/dL (04 Dec 2018 21:08)  POCT Blood Glucose.: 210 mg/dL (04 Dec 2018 17:00)      CAPILLARY BLOOD GLUCOSE      POCT Blood Glucose.: 121 mg/dL (05 Dec 2018 11:59)  POCT Blood Glucose.: 139 mg/dL (05 Dec 2018 07:40)  POCT Blood Glucose.: 222 mg/dL (04 Dec 2018 21:08)  POCT Blood Glucose.: 210 mg/dL (04 Dec 2018 17:00)    CAPILLARY BLOOD GLUCOSE      POCT Blood Glucose.: 121 mg/dL (05 Dec 2018 11:59)            RECENT CULTURES:      RADIOLOGY & ADDITIONAL TESTS:                        DVT/GI ppx  Discussed with pt @ bedside

## 2018-12-05 NOTE — PROGRESS NOTE ADULT - SUBJECTIVE AND OBJECTIVE BOX
pt seen  no complaints  ICU Vital Signs Last 24 Hrs  T(C): 37 (05 Dec 2018 09:30), Max: 37 (05 Dec 2018 09:30)  T(F): 98.6 (05 Dec 2018 09:30), Max: 98.6 (05 Dec 2018 09:30)  HR: 67 (05 Dec 2018 09:30) (67 - 72)  BP: 165/73 (05 Dec 2018 09:30) (147/73 - 165/73)  BP(mean): --  ABP: --  ABP(mean): --  RR: 18 (05 Dec 2018 09:30) (16 - 18)  SpO2: 97% (05 Dec 2018 09:30) (93% - 97%)  gen-NAD  resp-clear  abd-soft NT/ND                          8.6    11.20 )-----------( 359      ( 05 Dec 2018 10:07 )             28.1

## 2018-12-05 NOTE — PROGRESS NOTE ADULT - SUBJECTIVE AND OBJECTIVE BOX
Patient is a 68y old  Female who presents with a chief complaint of abdominal pain (28 Nov 2018 08:26)      Patient seen in follow up for ESRD on HD. On abx.     PAST MEDICAL HISTORY:  ESRD (end stage renal disease) on dialysis  CAD (coronary artery disease)  Diabetes  CRF (chronic renal failure)  Hypertension  Pneumonia  Myocardial infarction  Hypertension  Diabetes       MEDICATIONS  (STANDING):  aspirin enteric coated 81 milliGRAM(s) Oral daily  atorvastatin 80 milliGRAM(s) Oral at bedtime  chlorhexidine 2% Cloths 1 Application(s) Topical daily  cholestyramine Powder (Sugar-Free) 4 Gram(s) Oral daily  clopidogrel Tablet 75 milliGRAM(s) Oral daily  dextrose 5%. 1000 milliLiter(s) (50 mL/Hr) IV Continuous <Continuous>  dextrose 50% Injectable 12.5 Gram(s) IV Push once  dextrose 50% Injectable 25 Gram(s) IV Push once  dextrose 50% Injectable 25 Gram(s) IV Push once  epoetin analilia Injectable 58178 Unit(s) IV Push <User Schedule>  famotidine    Tablet 20 milliGRAM(s) Oral once  folic acid 1 milliGRAM(s) Oral daily  gabapentin 300 milliGRAM(s) Oral two times a day  insulin glargine Injectable (LANTUS) 5 Unit(s) SubCutaneous at bedtime  insulin lispro (HumaLOG) corrective regimen sliding scale   SubCutaneous three times a day before meals  insulin lispro (HumaLOG) corrective regimen sliding scale   SubCutaneous at bedtime  isosorbide   mononitrate ER Tablet (IMDUR) 30 milliGRAM(s) Oral daily  lactobacillus acidophilus 1 Tablet(s) Oral three times a day with meals  metoprolol tartrate 50 milliGRAM(s) Oral two times a day  NIFEdipine XL 60 milliGRAM(s) Oral daily  pantoprazole    Tablet 40 milliGRAM(s) Oral before breakfast  tamsulosin 0.4 milliGRAM(s) Oral at bedtime  tigecycline IVPB      tigecycline IVPB 50 milliGRAM(s) IV Intermittent every 12 hours    MEDICATIONS  (PRN):  dextrose 40% Gel 15 Gram(s) Oral once PRN Blood Glucose LESS THAN 70 milliGRAM(s)/deciliter  diphenhydrAMINE 25 milliGRAM(s) Oral every 6 hours PRN Rash and/or Itching  glucagon  Injectable 1 milliGRAM(s) IntraMuscular once PRN Glucose LESS THAN 70 milligrams/deciliter  hydrocortisone 1% Cream 1 Application(s) Topical two times a day PRN Rash and/or Itching  ondansetron Injectable 4 milliGRAM(s) IV Push every 6 hours PRN Nausea  simethicone 80 milliGRAM(s) Chew three times a day PRN Gas    T(C): 37 (12-05-18 @ 09:30), Max: 37.4 (12-03-18 @ 21:36)  HR: 67 (12-05-18 @ 09:30) (67 - 77)  BP: 165/73 (12-05-18 @ 09:30) (146/69 - 177/78)  RR: 18 (12-05-18 @ 09:30)  SpO2: 97% (12-05-18 @ 09:30)  Wt(kg): --  I&O's Detail    04 Dec 2018 07:01  -  05 Dec 2018 07:00  --------------------------------------------------------  IN:    IV PiggyBack: 100 mL  Total IN: 100 mL    OUT:  Total OUT: 0 mL    Total NET: 100 mL      PHYSICAL EXAM:  General: NAD, Rt neck dialysis catheter  Respiratory: b/l air entry  Cardiovascular: S1 S2  Gastrointestinal: soft  Extremities:  edema, PICC                    LABORATORY:                        8.4    13.95 )-----------( 374      ( 03 Dec 2018 11:49 )             27.8     12-03    135  |  97  |  48<H>  ----------------------------<  200<H>  3.8   |  28  |  6.60<H>    Ca    8.1<L>      03 Dec 2018 11:49    TPro  7.4  /  Alb  2.1<L>  /  TBili  0.4  /  DBili  x   /  AST  21  /  ALT  14  /  AlkPhos  208<H>  12-03    Sodium, Serum: 135 mmol/L (12-03 @ 11:49)    Potassium, Serum: 3.8 mmol/L (12-03 @ 11:49)    Hemoglobin: 8.4 g/dL (12-03 @ 11:49)    Creatinine, Serum 6.60 (12-03 @ 11:49)        LIVER FUNCTIONS - ( 03 Dec 2018 11:49 )  Alb: 2.1 g/dL / Pro: 7.4 g/dL / ALK PHOS: 208 U/L / ALT: 14 U/L / AST: 21 U/L / GGT: x

## 2018-12-05 NOTE — PROGRESS NOTE ADULT - SUBJECTIVE AND OBJECTIVE BOX
INTERVAL HPI/OVERNIGHT EVENTS:  pt seen and examined  denies abd pain and diarrhea  per overnight rn pt with one small formed bm overnight no s/s active gib  no new labs to assess afebrile overnight    MEDICATIONS  (STANDING):  aspirin enteric coated 81 milliGRAM(s) Oral daily  atorvastatin 80 milliGRAM(s) Oral at bedtime  chlorhexidine 2% Cloths 1 Application(s) Topical daily  cholestyramine Powder (Sugar-Free) 4 Gram(s) Oral daily  clopidogrel Tablet 75 milliGRAM(s) Oral daily  dextrose 5%. 1000 milliLiter(s) (50 mL/Hr) IV Continuous <Continuous>  dextrose 50% Injectable 12.5 Gram(s) IV Push once  dextrose 50% Injectable 25 Gram(s) IV Push once  dextrose 50% Injectable 25 Gram(s) IV Push once  epoetin analilia Injectable 73867 Unit(s) IV Push <User Schedule>  famotidine    Tablet 20 milliGRAM(s) Oral once  folic acid 1 milliGRAM(s) Oral daily  gabapentin 300 milliGRAM(s) Oral two times a day  insulin glargine Injectable (LANTUS) 5 Unit(s) SubCutaneous at bedtime  insulin lispro (HumaLOG) corrective regimen sliding scale   SubCutaneous three times a day before meals  insulin lispro (HumaLOG) corrective regimen sliding scale   SubCutaneous at bedtime  isosorbide   mononitrate ER Tablet (IMDUR) 30 milliGRAM(s) Oral daily  lactobacillus acidophilus 1 Tablet(s) Oral three times a day with meals  metoprolol tartrate 50 milliGRAM(s) Oral two times a day  NIFEdipine XL 60 milliGRAM(s) Oral daily  pantoprazole    Tablet 40 milliGRAM(s) Oral before breakfast  tamsulosin 0.4 milliGRAM(s) Oral at bedtime  tigecycline IVPB      tigecycline IVPB 50 milliGRAM(s) IV Intermittent every 12 hours    MEDICATIONS  (PRN):  dextrose 40% Gel 15 Gram(s) Oral once PRN Blood Glucose LESS THAN 70 milliGRAM(s)/deciliter  diphenhydrAMINE 25 milliGRAM(s) Oral every 6 hours PRN Rash and/or Itching  glucagon  Injectable 1 milliGRAM(s) IntraMuscular once PRN Glucose LESS THAN 70 milligrams/deciliter  hydrocortisone 1% Cream 1 Application(s) Topical two times a day PRN Rash and/or Itching  ondansetron Injectable 4 milliGRAM(s) IV Push every 6 hours PRN Nausea  simethicone 80 milliGRAM(s) Chew three times a day PRN Gas      Allergies    ertapenem (Urticaria)  Purell (Rash)    Intolerances        Review of Systems:    General:  No wt loss, fevers, chills, night sweats, fatigue   Eyes:  Good vision, no reported pain  ENT:  No sore throat, pain, runny nose, dysphagia  CV:  No pain, palpitations, hypo/hypertension  Resp:  No dyspnea, cough, tachypnea, wheezing  GI:  No pain, No nausea, No vomiting, No diarrhea, No constipation, No weight loss, No fever, No pruritis, No rectal bleeding, No melena, No dysphagia  :  No pain, bleeding, incontinence, nocturia  Muscle:  No pain, weakness  Neuro:  No weakness, tingling, memory problems  Psych:  No fatigue, insomnia, mood problems, depression  Endocrine:  No polyuria, polydypsia, cold/heat intolerance  Heme:  No petechiae, ecchymosis, easy bruisability  Skin:  No rash, tattoos, scars, edema      Vital Signs Last 24 Hrs  T(C): 36.7 (05 Dec 2018 05:34), Max: 36.8 (04 Dec 2018 21:39)  T(F): 98.1 (05 Dec 2018 05:34), Max: 98.2 (04 Dec 2018 21:39)  HR: 72 (05 Dec 2018 05:34) (67 - 72)  BP: 147/73 (05 Dec 2018 05:34) (147/73 - 156/72)  BP(mean): --  RR: 16 (05 Dec 2018 05:34) (16 - 16)  SpO2: 93% (05 Dec 2018 05:34) (93% - 96%)    PHYSICAL EXAM:    Constitutional: NAD  HEENT:  NC/AT  Chest: dec bs  Cardiovascular:  Regular rhythm, S1, S2  Abdomen: obese abd soft nt mild dt  Extremities:  mild edema  Skin:  scattered ecchymosis  Neuro/Psych:  Awake alert responds appropriately    LABS:                        8.4    13.95 )-----------( 374      ( 03 Dec 2018 11:49 )             27.8     12-03    135  |  97  |  48<H>  ----------------------------<  200<H>  3.8   |  28  |  6.60<H>    Ca    8.1<L>      03 Dec 2018 11:49    TPro  7.4  /  Alb  2.1<L>  /  TBili  0.4  /  DBili  x   /  AST  21  /  ALT  14  /  AlkPhos  208<H>  12-03          RADIOLOGY & ADDITIONAL TESTS:

## 2018-12-06 PROCEDURE — 99232 SBSQ HOSP IP/OBS MODERATE 35: CPT

## 2018-12-06 RX ORDER — HYDROCORTISONE 1 %
1 OINTMENT (GRAM) TOPICAL
Qty: 0 | Refills: 0 | COMMUNITY
Start: 2018-12-06

## 2018-12-06 RX ORDER — FAMOTIDINE 10 MG/ML
1 INJECTION INTRAVENOUS
Qty: 0 | Refills: 0 | COMMUNITY
Start: 2018-12-06

## 2018-12-06 RX ADMIN — Medication 1 MILLIGRAM(S): at 12:28

## 2018-12-06 RX ADMIN — Medication 50 MILLIGRAM(S): at 17:02

## 2018-12-06 RX ADMIN — ISOSORBIDE MONONITRATE 30 MILLIGRAM(S): 60 TABLET, EXTENDED RELEASE ORAL at 12:28

## 2018-12-06 RX ADMIN — GABAPENTIN 300 MILLIGRAM(S): 400 CAPSULE ORAL at 17:02

## 2018-12-06 RX ADMIN — Medication 4: at 16:59

## 2018-12-06 RX ADMIN — PANTOPRAZOLE SODIUM 40 MILLIGRAM(S): 20 TABLET, DELAYED RELEASE ORAL at 05:28

## 2018-12-06 RX ADMIN — TIGECYCLINE 105 MILLIGRAM(S): 50 INJECTION, POWDER, LYOPHILIZED, FOR SOLUTION INTRAVENOUS at 14:35

## 2018-12-06 RX ADMIN — TIGECYCLINE 105 MILLIGRAM(S): 50 INJECTION, POWDER, LYOPHILIZED, FOR SOLUTION INTRAVENOUS at 01:00

## 2018-12-06 RX ADMIN — TAMSULOSIN HYDROCHLORIDE 0.4 MILLIGRAM(S): 0.4 CAPSULE ORAL at 21:24

## 2018-12-06 RX ADMIN — Medication 2: at 12:19

## 2018-12-06 RX ADMIN — ATORVASTATIN CALCIUM 80 MILLIGRAM(S): 80 TABLET, FILM COATED ORAL at 21:24

## 2018-12-06 RX ADMIN — GABAPENTIN 300 MILLIGRAM(S): 400 CAPSULE ORAL at 05:28

## 2018-12-06 RX ADMIN — Medication 81 MILLIGRAM(S): at 12:21

## 2018-12-06 RX ADMIN — Medication 1 TABLET(S): at 12:28

## 2018-12-06 RX ADMIN — CHOLESTYRAMINE 4 GRAM(S): 4 POWDER, FOR SUSPENSION ORAL at 08:14

## 2018-12-06 RX ADMIN — INSULIN GLARGINE 5 UNIT(S): 100 INJECTION, SOLUTION SUBCUTANEOUS at 21:41

## 2018-12-06 RX ADMIN — Medication 60 MILLIGRAM(S): at 05:27

## 2018-12-06 RX ADMIN — Medication 50 MILLIGRAM(S): at 05:27

## 2018-12-06 RX ADMIN — Medication 1 TABLET(S): at 16:59

## 2018-12-06 RX ADMIN — CLOPIDOGREL BISULFATE 75 MILLIGRAM(S): 75 TABLET, FILM COATED ORAL at 12:21

## 2018-12-06 RX ADMIN — Medication 1 TABLET(S): at 08:14

## 2018-12-06 NOTE — PROGRESS NOTE ADULT - SUBJECTIVE AND OBJECTIVE BOX
Patient is a 68y old  Female who presents with a chief complaint of abdominal pain (06 Dec 2018 11:43)        HPI:  this si a 70 y/o f with pmhx of htn, hld, obesity, HD dependant, DM 2 on basilglar, s/p cholecystotomy tube removal with biliary stent placement at [facility] p/w abdominal pain to ER. She developed abd pain while having HD and they had to stop that and send her to ED. Pt narrates that her pain is 4/10 in lower abdomen and non radiating. she was found ot have multiple liver lesion concening for liver abscesses and lipas eof 489 concerning for acute pancreatitis, GI was called for evaluation. pt ha sno wbc elevationan dno fever  acc by daughter in law (26 Nov 2018 20:39)      SUBJECTIVE & OBJECTIVE: Pt seen and examined at bedside, no acute complaints     PHYSICAL EXAM:  T(C): 36.7 (12-06-18 @ 13:46), Max: 37 (12-05-18 @ 21:55)  HR: 70 (12-06-18 @ 13:46) (69 - 72)  BP: 160/69 (12-06-18 @ 13:46) (153/73 - 161/71)  RR: 16 (12-06-18 @ 13:46) (16 - 16)  SpO2: 94% (12-06-18 @ 13:46) (94% - 95%)  Wt(kg): --   GENERAL: NAD, well-groomed, well-developed  ENMT: Moist mucous membranes  NECK: Supple, No JVD  NERVOUS SYSTEM:  Alert & Oriented X3,  CHEST/LUNG: Clear to auscultation bilaterally; No rales, rhonchi, wheezing, or rubs  HEART: Regular rate and rhythm; No murmurs, rubs, or gallops  ABDOMEN: Soft, Nontender, Nondistended; Bowel sounds present  EXTREMITIES:  2+ Peripheral Pulses, No clubbing, cyanosis, or edema        MEDICATIONS  (STANDING):  aspirin enteric coated 81 milliGRAM(s) Oral daily  atorvastatin 80 milliGRAM(s) Oral at bedtime  chlorhexidine 2% Cloths 1 Application(s) Topical daily  cholestyramine Powder (Sugar-Free) 4 Gram(s) Oral daily  clopidogrel Tablet 75 milliGRAM(s) Oral daily  dextrose 5%. 1000 milliLiter(s) (50 mL/Hr) IV Continuous <Continuous>  dextrose 50% Injectable 12.5 Gram(s) IV Push once  dextrose 50% Injectable 25 Gram(s) IV Push once  dextrose 50% Injectable 25 Gram(s) IV Push once  epoetin analilia Injectable 04654 Unit(s) IV Push <User Schedule>  famotidine    Tablet 20 milliGRAM(s) Oral once  folic acid 1 milliGRAM(s) Oral daily  gabapentin 300 milliGRAM(s) Oral two times a day  insulin glargine Injectable (LANTUS) 5 Unit(s) SubCutaneous at bedtime  insulin lispro (HumaLOG) corrective regimen sliding scale   SubCutaneous three times a day before meals  insulin lispro (HumaLOG) corrective regimen sliding scale   SubCutaneous at bedtime  isosorbide   mononitrate ER Tablet (IMDUR) 30 milliGRAM(s) Oral daily  lactobacillus acidophilus 1 Tablet(s) Oral three times a day with meals  metoprolol tartrate 50 milliGRAM(s) Oral two times a day  NIFEdipine XL 60 milliGRAM(s) Oral daily  pantoprazole    Tablet 40 milliGRAM(s) Oral before breakfast  tamsulosin 0.4 milliGRAM(s) Oral at bedtime  tigecycline IVPB      tigecycline IVPB 50 milliGRAM(s) IV Intermittent every 12 hours    MEDICATIONS  (PRN):  dextrose 40% Gel 15 Gram(s) Oral once PRN Blood Glucose LESS THAN 70 milliGRAM(s)/deciliter  diphenhydrAMINE 25 milliGRAM(s) Oral every 6 hours PRN Rash and/or Itching  glucagon  Injectable 1 milliGRAM(s) IntraMuscular once PRN Glucose LESS THAN 70 milligrams/deciliter  hydrocortisone 1% Cream 1 Application(s) Topical two times a day PRN Rash and/or Itching  ondansetron Injectable 4 milliGRAM(s) IV Push every 6 hours PRN Nausea  simethicone 80 milliGRAM(s) Chew three times a day PRN Gas      LABS:                        8.6    11.20 )-----------( 359      ( 05 Dec 2018 10:07 )             28.1     12-05    134<L>  |  96  |  50<H>  ----------------------------<  211<H>  4.0   |  30  |  5.00<H>    Ca    7.4<L>      05 Dec 2018 10:07            CAPILLARY BLOOD GLUCOSE      POCT Blood Glucose.: 209 mg/dL (06 Dec 2018 12:05)  POCT Blood Glucose.: 147 mg/dL (06 Dec 2018 07:28)  POCT Blood Glucose.: 244 mg/dL (05 Dec 2018 21:42)  POCT Blood Glucose.: 241 mg/dL (05 Dec 2018 16:57)      CAPILLARY BLOOD GLUCOSE      POCT Blood Glucose.: 209 mg/dL (06 Dec 2018 12:05)  POCT Blood Glucose.: 147 mg/dL (06 Dec 2018 07:28)  POCT Blood Glucose.: 244 mg/dL (05 Dec 2018 21:42)  POCT Blood Glucose.: 241 mg/dL (05 Dec 2018 16:57)    CAPILLARY BLOOD GLUCOSE      POCT Blood Glucose.: 209 mg/dL (06 Dec 2018 12:05)            RECENT CULTURES:      RADIOLOGY & ADDITIONAL TESTS:                        DVT/GI ppx  Discussed with pt @ bedside

## 2018-12-06 NOTE — PROGRESS NOTE ADULT - PROBLEM SELECTOR PLAN 4
likely multifactorial, acute illness, chronic dz, esrd  around baseline from previous admission  hgb low but stable  no overt s/s gib  trend h/h, transfuse prn  cont ppi, on epo  iron studies cw aocd  hold anti plts in setting of active gib  will follow

## 2018-12-06 NOTE — PROGRESS NOTE ADULT - SUBJECTIVE AND OBJECTIVE BOX
INTERVAL HPI/OVERNIGHT EVENTS:  pt seen and examined  denies abd pain +bm yesterday  per overnight rn no new events  afebrile overnight no new labs to assess    MEDICATIONS  (STANDING):  aspirin enteric coated 81 milliGRAM(s) Oral daily  atorvastatin 80 milliGRAM(s) Oral at bedtime  chlorhexidine 2% Cloths 1 Application(s) Topical daily  cholestyramine Powder (Sugar-Free) 4 Gram(s) Oral daily  clopidogrel Tablet 75 milliGRAM(s) Oral daily  dextrose 5%. 1000 milliLiter(s) (50 mL/Hr) IV Continuous <Continuous>  dextrose 50% Injectable 12.5 Gram(s) IV Push once  dextrose 50% Injectable 25 Gram(s) IV Push once  dextrose 50% Injectable 25 Gram(s) IV Push once  epoetin analilia Injectable 36223 Unit(s) IV Push <User Schedule>  famotidine    Tablet 20 milliGRAM(s) Oral once  folic acid 1 milliGRAM(s) Oral daily  gabapentin 300 milliGRAM(s) Oral two times a day  insulin glargine Injectable (LANTUS) 5 Unit(s) SubCutaneous at bedtime  insulin lispro (HumaLOG) corrective regimen sliding scale   SubCutaneous three times a day before meals  insulin lispro (HumaLOG) corrective regimen sliding scale   SubCutaneous at bedtime  isosorbide   mononitrate ER Tablet (IMDUR) 30 milliGRAM(s) Oral daily  lactobacillus acidophilus 1 Tablet(s) Oral three times a day with meals  metoprolol tartrate 50 milliGRAM(s) Oral two times a day  NIFEdipine XL 60 milliGRAM(s) Oral daily  pantoprazole    Tablet 40 milliGRAM(s) Oral before breakfast  tamsulosin 0.4 milliGRAM(s) Oral at bedtime  tigecycline IVPB      tigecycline IVPB 50 milliGRAM(s) IV Intermittent every 12 hours    MEDICATIONS  (PRN):  dextrose 40% Gel 15 Gram(s) Oral once PRN Blood Glucose LESS THAN 70 milliGRAM(s)/deciliter  diphenhydrAMINE 25 milliGRAM(s) Oral every 6 hours PRN Rash and/or Itching  glucagon  Injectable 1 milliGRAM(s) IntraMuscular once PRN Glucose LESS THAN 70 milligrams/deciliter  hydrocortisone 1% Cream 1 Application(s) Topical two times a day PRN Rash and/or Itching  ondansetron Injectable 4 milliGRAM(s) IV Push every 6 hours PRN Nausea  simethicone 80 milliGRAM(s) Chew three times a day PRN Gas      Allergies    ertapenem (Urticaria)  Purell (Rash)    Intolerances        Review of Systems:    General:  No wt loss, fevers, chills, night sweats, fatigue   Eyes:  Good vision, no reported pain  ENT:  No sore throat, pain, runny nose, dysphagia  CV:  No pain, palpitations, hypo/hypertension  Resp:  No dyspnea, cough, tachypnea, wheezing  GI:  No pain, No nausea, No vomiting, No diarrhea, No constipation, No weight loss, No fever, No pruritis, No rectal bleeding, No melena, No dysphagia  :  No pain, bleeding, incontinence, nocturia  Muscle:  No pain, weakness  Neuro:  No weakness, tingling, memory problems  Psych:  No fatigue, insomnia, mood problems, depression  Endocrine:  No polyuria, polydypsia, cold/heat intolerance  Heme:  No petechiae, ecchymosis, easy bruisability  Skin:  No rash, tattoos, scars, edema      Vital Signs Last 24 Hrs  T(C): 36.7 (06 Dec 2018 05:19), Max: 37.1 (05 Dec 2018 13:24)  T(F): 98 (06 Dec 2018 05:19), Max: 98.7 (05 Dec 2018 13:24)  HR: 69 (06 Dec 2018 05:19) (67 - 73)  BP: 153/73 (06 Dec 2018 05:19) (153/64 - 165/73)  BP(mean): --  RR: 16 (06 Dec 2018 05:19) (16 - 18)  SpO2: 95% (06 Dec 2018 05:19) (95% - 97%)    PHYSICAL EXAM:    Constitutional: NAD  HEENT:  NC/AT  Chest: dec bs  Cardiovascular:  Regular rhythm, S1, S2  Abdomen: obese abd soft nt mild dt  Extremities:  mild edema  Skin:  scattered ecchymosis  Neuro/Psych:  Awake alert responds appropriately      LABS:                        8.6    11.20 )-----------( 359      ( 05 Dec 2018 10:07 )             28.1     12-05    134<L>  |  96  |  50<H>  ----------------------------<  211<H>  4.0   |  30  |  5.00<H>    Ca    7.4<L>      05 Dec 2018 10:07            RADIOLOGY & ADDITIONAL TESTS:

## 2018-12-06 NOTE — PROGRESS NOTE ADULT - SUBJECTIVE AND OBJECTIVE BOX
pt seen  no complaints  ICU Vital Signs Last 24 Hrs  T(C): 36.7 (06 Dec 2018 05:19), Max: 37.1 (05 Dec 2018 13:24)  T(F): 98 (06 Dec 2018 05:19), Max: 98.7 (05 Dec 2018 13:24)  HR: 69 (06 Dec 2018 05:19) (69 - 73)  BP: 153/73 (06 Dec 2018 05:19) (153/64 - 161/71)  BP(mean): --  ABP: --  ABP(mean): --  RR: 16 (06 Dec 2018 05:19) (16 - 18)  SpO2: 95% (06 Dec 2018 05:19) (95% - 97%)  gen-NAD  resp-clear  abd-soft NT/ND                          8.6    11.20 )-----------( 359      ( 05 Dec 2018 10:07 )             28.1   12-05    134<L>  |  96  |  50<H>  ----------------------------<  211<H>  4.0   |  30  |  5.00<H>    Ca    7.4<L>      05 Dec 2018 10:07

## 2018-12-06 NOTE — PROGRESS NOTE ADULT - PROBLEM SELECTOR PLAN 3
resolved per nursing  cont bacid, cont questran  if recurs send stool studies  monitor for constipation

## 2018-12-06 NOTE — PROGRESS NOTE ADULT - SUBJECTIVE AND OBJECTIVE BOX
Patient is a 68y old  Female who presents with a chief complaint of abdominal pain (28 Nov 2018 08:26)      Patient seen in follow up for ESRD on HD. On abx.     PAST MEDICAL HISTORY:  ESRD (end stage renal disease) on dialysis  CAD (coronary artery disease)  Diabetes  CRF (chronic renal failure)  Hypertension  Pneumonia  Myocardial infarction  Hypertension  Diabetes       MEDICATIONS  (STANDING):  aspirin enteric coated 81 milliGRAM(s) Oral daily  atorvastatin 80 milliGRAM(s) Oral at bedtime  chlorhexidine 2% Cloths 1 Application(s) Topical daily  cholestyramine Powder (Sugar-Free) 4 Gram(s) Oral daily  clopidogrel Tablet 75 milliGRAM(s) Oral daily  dextrose 5%. 1000 milliLiter(s) (50 mL/Hr) IV Continuous <Continuous>  dextrose 50% Injectable 12.5 Gram(s) IV Push once  dextrose 50% Injectable 25 Gram(s) IV Push once  dextrose 50% Injectable 25 Gram(s) IV Push once  epoetin analilia Injectable 11503 Unit(s) IV Push <User Schedule>  famotidine    Tablet 20 milliGRAM(s) Oral once  folic acid 1 milliGRAM(s) Oral daily  gabapentin 300 milliGRAM(s) Oral two times a day  insulin glargine Injectable (LANTUS) 5 Unit(s) SubCutaneous at bedtime  insulin lispro (HumaLOG) corrective regimen sliding scale   SubCutaneous three times a day before meals  insulin lispro (HumaLOG) corrective regimen sliding scale   SubCutaneous at bedtime  isosorbide   mononitrate ER Tablet (IMDUR) 30 milliGRAM(s) Oral daily  lactobacillus acidophilus 1 Tablet(s) Oral three times a day with meals  metoprolol tartrate 50 milliGRAM(s) Oral two times a day  NIFEdipine XL 60 milliGRAM(s) Oral daily  pantoprazole    Tablet 40 milliGRAM(s) Oral before breakfast  tamsulosin 0.4 milliGRAM(s) Oral at bedtime  tigecycline IVPB      tigecycline IVPB 50 milliGRAM(s) IV Intermittent every 12 hours    MEDICATIONS  (PRN):  dextrose 40% Gel 15 Gram(s) Oral once PRN Blood Glucose LESS THAN 70 milliGRAM(s)/deciliter  diphenhydrAMINE 25 milliGRAM(s) Oral every 6 hours PRN Rash and/or Itching  glucagon  Injectable 1 milliGRAM(s) IntraMuscular once PRN Glucose LESS THAN 70 milligrams/deciliter  hydrocortisone 1% Cream 1 Application(s) Topical two times a day PRN Rash and/or Itching  ondansetron Injectable 4 milliGRAM(s) IV Push every 6 hours PRN Nausea  simethicone 80 milliGRAM(s) Chew three times a day PRN Gas    T(C): 36.7 (12-06-18 @ 05:19), Max: 37.1 (12-05-18 @ 13:24)  HR: 69 (12-06-18 @ 05:19) (67 - 73)  BP: 153/73 (12-06-18 @ 05:19) (147/73 - 165/73)  RR: 16 (12-06-18 @ 05:19)  SpO2: 95% (12-06-18 @ 05:19)  Wt(kg): --  I&O's Detail    05 Dec 2018 07:01  -  06 Dec 2018 07:00  --------------------------------------------------------  IN:    Other: 2000 mL  Total IN: 2000 mL    OUT:  Total OUT: 0 mL    Total NET: 2000 mL          PHYSICAL EXAM:  General: NAD, Rt neck dialysis catheter  Respiratory: b/l air entry  Cardiovascular: S1 S2  Gastrointestinal: soft  Extremities:  edema, PICC                   LABORATORY:                        8.6    11.20 )-----------( 359      ( 05 Dec 2018 10:07 )             28.1     12-05    134<L>  |  96  |  50<H>  ----------------------------<  211<H>  4.0   |  30  |  5.00<H>    Ca    7.4<L>      05 Dec 2018 10:07      Sodium, Serum: 134 mmol/L (12-05 @ 10:07)    Potassium, Serum: 4.0 mmol/L (12-05 @ 10:07)    Hemoglobin: 8.6 g/dL (12-05 @ 10:07)  Hemoglobin: 8.4 g/dL (12-03 @ 11:49)    Creatinine, Serum 5.00 (12-05 @ 10:07)  Creatinine, Serum 6.60 (12-03 @ 11:49)

## 2018-12-07 VITALS
SYSTOLIC BLOOD PRESSURE: 160 MMHG | DIASTOLIC BLOOD PRESSURE: 71 MMHG | OXYGEN SATURATION: 97 % | RESPIRATION RATE: 78 BRPM | HEART RATE: 73 BPM

## 2018-12-07 LAB
ANION GAP SERPL CALC-SCNC: 7 MMOL/L — SIGNIFICANT CHANGE UP (ref 5–17)
BUN SERPL-MCNC: 62 MG/DL — HIGH (ref 7–23)
CALCIUM SERPL-MCNC: 7.6 MG/DL — LOW (ref 8.5–10.1)
CHLORIDE SERPL-SCNC: 99 MMOL/L — SIGNIFICANT CHANGE UP (ref 96–108)
CO2 SERPL-SCNC: 29 MMOL/L — SIGNIFICANT CHANGE UP (ref 22–31)
CREAT SERPL-MCNC: 5.4 MG/DL — HIGH (ref 0.5–1.3)
GLUCOSE SERPL-MCNC: 160 MG/DL — HIGH (ref 70–99)
HCT VFR BLD CALC: 29.6 % — LOW (ref 34.5–45)
HGB BLD-MCNC: 9 G/DL — LOW (ref 11.5–15.5)
MCHC RBC-ENTMCNC: 27 PG — SIGNIFICANT CHANGE UP (ref 27–34)
MCHC RBC-ENTMCNC: 30.4 GM/DL — LOW (ref 32–36)
MCV RBC AUTO: 88.9 FL — SIGNIFICANT CHANGE UP (ref 80–100)
NRBC # BLD: 0 /100 WBCS — SIGNIFICANT CHANGE UP (ref 0–0)
PLATELET # BLD AUTO: 386 K/UL — SIGNIFICANT CHANGE UP (ref 150–400)
POTASSIUM SERPL-MCNC: 4.6 MMOL/L — SIGNIFICANT CHANGE UP (ref 3.5–5.3)
POTASSIUM SERPL-SCNC: 4.6 MMOL/L — SIGNIFICANT CHANGE UP (ref 3.5–5.3)
RBC # BLD: 3.33 M/UL — LOW (ref 3.8–5.2)
RBC # FLD: 17.7 % — HIGH (ref 10.3–14.5)
SODIUM SERPL-SCNC: 135 MMOL/L — SIGNIFICANT CHANGE UP (ref 135–145)
WBC # BLD: 12.5 K/UL — HIGH (ref 3.8–10.5)
WBC # FLD AUTO: 12.5 K/UL — HIGH (ref 3.8–10.5)

## 2018-12-07 PROCEDURE — 99261: CPT

## 2018-12-07 PROCEDURE — 82728 ASSAY OF FERRITIN: CPT

## 2018-12-07 PROCEDURE — 85610 PROTHROMBIN TIME: CPT

## 2018-12-07 PROCEDURE — 99239 HOSP IP/OBS DSCHRG MGMT >30: CPT

## 2018-12-07 PROCEDURE — 84100 ASSAY OF PHOSPHORUS: CPT

## 2018-12-07 PROCEDURE — 36558 INSERT TUNNELED CV CATH: CPT

## 2018-12-07 PROCEDURE — 99285 EMERGENCY DEPT VISIT HI MDM: CPT | Mod: 25

## 2018-12-07 PROCEDURE — 87640 STAPH A DNA AMP PROBE: CPT

## 2018-12-07 PROCEDURE — 74181 MRI ABDOMEN W/O CONTRAST: CPT

## 2018-12-07 PROCEDURE — 83735 ASSAY OF MAGNESIUM: CPT

## 2018-12-07 PROCEDURE — 97162 PT EVAL MOD COMPLEX 30 MIN: CPT

## 2018-12-07 PROCEDURE — 83550 IRON BINDING TEST: CPT

## 2018-12-07 PROCEDURE — 97530 THERAPEUTIC ACTIVITIES: CPT

## 2018-12-07 PROCEDURE — 83540 ASSAY OF IRON: CPT

## 2018-12-07 PROCEDURE — 87040 BLOOD CULTURE FOR BACTERIA: CPT

## 2018-12-07 PROCEDURE — 97116 GAIT TRAINING THERAPY: CPT

## 2018-12-07 PROCEDURE — 82962 GLUCOSE BLOOD TEST: CPT

## 2018-12-07 PROCEDURE — 74177 CT ABD & PELVIS W/CONTRAST: CPT

## 2018-12-07 PROCEDURE — 77001 FLUOROGUIDE FOR VEIN DEVICE: CPT

## 2018-12-07 PROCEDURE — 87641 MR-STAPH DNA AMP PROBE: CPT

## 2018-12-07 PROCEDURE — 93005 ELECTROCARDIOGRAM TRACING: CPT

## 2018-12-07 PROCEDURE — 76937 US GUIDE VASCULAR ACCESS: CPT

## 2018-12-07 PROCEDURE — 87086 URINE CULTURE/COLONY COUNT: CPT

## 2018-12-07 PROCEDURE — 82746 ASSAY OF FOLIC ACID SERUM: CPT

## 2018-12-07 PROCEDURE — 82607 VITAMIN B-12: CPT

## 2018-12-07 PROCEDURE — 83690 ASSAY OF LIPASE: CPT

## 2018-12-07 PROCEDURE — C1751: CPT

## 2018-12-07 PROCEDURE — 80053 COMPREHEN METABOLIC PANEL: CPT

## 2018-12-07 PROCEDURE — 71045 X-RAY EXAM CHEST 1 VIEW: CPT

## 2018-12-07 PROCEDURE — 85027 COMPLETE CBC AUTOMATED: CPT

## 2018-12-07 PROCEDURE — 83605 ASSAY OF LACTIC ACID: CPT

## 2018-12-07 PROCEDURE — 80048 BASIC METABOLIC PNL TOTAL CA: CPT

## 2018-12-07 PROCEDURE — 80076 HEPATIC FUNCTION PANEL: CPT

## 2018-12-07 PROCEDURE — 36415 COLL VENOUS BLD VENIPUNCTURE: CPT

## 2018-12-07 PROCEDURE — C1894: CPT

## 2018-12-07 RX ADMIN — GABAPENTIN 300 MILLIGRAM(S): 400 CAPSULE ORAL at 05:05

## 2018-12-07 RX ADMIN — Medication 1 TABLET(S): at 08:24

## 2018-12-07 RX ADMIN — Medication 1 TABLET(S): at 17:31

## 2018-12-07 RX ADMIN — GABAPENTIN 300 MILLIGRAM(S): 400 CAPSULE ORAL at 17:31

## 2018-12-07 RX ADMIN — ISOSORBIDE MONONITRATE 30 MILLIGRAM(S): 60 TABLET, EXTENDED RELEASE ORAL at 12:11

## 2018-12-07 RX ADMIN — CLOPIDOGREL BISULFATE 75 MILLIGRAM(S): 75 TABLET, FILM COATED ORAL at 12:11

## 2018-12-07 RX ADMIN — Medication 60 MILLIGRAM(S): at 05:04

## 2018-12-07 RX ADMIN — CHOLESTYRAMINE 4 GRAM(S): 4 POWDER, FOR SUSPENSION ORAL at 08:24

## 2018-12-07 RX ADMIN — Medication 1: at 08:25

## 2018-12-07 RX ADMIN — TIGECYCLINE 105 MILLIGRAM(S): 50 INJECTION, POWDER, LYOPHILIZED, FOR SOLUTION INTRAVENOUS at 02:55

## 2018-12-07 RX ADMIN — TIGECYCLINE 105 MILLIGRAM(S): 50 INJECTION, POWDER, LYOPHILIZED, FOR SOLUTION INTRAVENOUS at 17:13

## 2018-12-07 RX ADMIN — Medication 1 MILLIGRAM(S): at 12:11

## 2018-12-07 RX ADMIN — Medication 50 MILLIGRAM(S): at 17:31

## 2018-12-07 RX ADMIN — Medication 50 MILLIGRAM(S): at 05:04

## 2018-12-07 RX ADMIN — PANTOPRAZOLE SODIUM 40 MILLIGRAM(S): 20 TABLET, DELAYED RELEASE ORAL at 05:06

## 2018-12-07 RX ADMIN — ERYTHROPOIETIN 10000 UNIT(S): 10000 INJECTION, SOLUTION INTRAVENOUS; SUBCUTANEOUS at 14:00

## 2018-12-07 RX ADMIN — Medication 1 TABLET(S): at 12:11

## 2018-12-07 RX ADMIN — Medication 81 MILLIGRAM(S): at 12:11

## 2018-12-07 NOTE — PROGRESS NOTE ADULT - PROBLEM SELECTOR PLAN 9
VTE ppx: ICD (pt on ASA, plavix and is having bruising at HSQ injection sites and is refusing to have any more HSQ)
VTE ppx: HSQ

## 2018-12-07 NOTE — PROGRESS NOTE ADULT - ASSESSMENT
·	ESRD on HD  ·	s/p recent ERCP and CBD stone extraction @ [facility]  ·	Diabetes  ·	Hypertension  ·	Abd pain, Liver abscess    HD today. To continue HD TIW as scheduled. Fluid removal as tolerated by BP. Dietary and PO fluid restriction. Epogen as needed for anemia.  Monitor BP trend. Titrate BP meds as needed. Salt restriction. ID follow up. Monitor blood sugar levels. Insulin coverage as needed.
67 yo with hepatic abscessess     cont abx     cleared for d/c from gen surgery standpoint
67yo Roberto speaking obese F with PMH of CAD s/p stent (2007), ESRD on HD (MWF), DM type 2, HTN, recent admission for severe sepsis with ESBL E coli bacteremia due to ascending cholangitis with CBD stone, s/p stone extraction and stent placement in [facility], now presents with lower abd pain and found to have new liver lesions suspicious for abscesses, a/w liver abscesses c/b acute urinary retention.
68 y/o f with pmhx of htn, hld, obesity, HD dependant, DM 2 on basilglar, s/p cholecystotomy tube removal with biliary stent placement at [facility] p/w abdominal pain to ER. gi consulted for liver abscesses on ct
68 y/o f with pmhx of htn, hld, obesity, HD dependant, DM 2 on basilglar, s/p cholecystotomy tube removal with biliary stent placement at [facility] p/w abdominal pain to ER. gi consulted for liver abscesses on ct
68 y/o f with pmhx of htn, hld, obesity, HD dependant, DM 2 on basilglar, s/p cholecystotomy tube removal with biliary stent placement at [facility] p/w abdominal pain to ER. gi consulted for liver abscesses on ct
68 y/o f with pmhx of htn, hld, obesity, HD dependant, DM 2 on basilglar, s/p cholecystotomy tube removal with biliary stent placement at [facility] p/w abdominal pain to ER. gi consulted for liver abscesses on ct
68F with multiple medical issues including CAD, DM2, ESRD/HD, ESBL E coli septicemia from cholecystitis/cholangitis now admitted for low abdominal pain incidentially found to have liver lesions concerning for abscesses. However hard to attribute her pain at presentation to liver lesions given very disparate locations. Would expect referred pain to shoulder, not bilateral lower abdomen. They are like too small and/or inaccessible to drainage attempts. WBC normal without left shift, and no fever. No new culture data. AKP elevated but overall trend down. Relegated to treat with long term antibiotics vs. liver abscesses here. NO good PO options. Discussed with daughter by phone, wants mom home, not in facility.    Suggestions--  PICC  IV ertapenem x6 weeks    Ed Solis MD  [phone]
68F with multiple medical issues including CAD, DM2, ESRD/HD, ESBL E coli septicemia from cholecystitis/cholangitis now admitted for low abdominal pain incidentially found to have liver lesions concerning for abscesses. However hard to attribute her pain at presentation to liver lesions given very disparate locations. Would expect referred pain to shoulder, not bilateral lower abdomen. They are like too small and/or inaccessible to drainage attempts. WBC normal without left shift, and no fever. No new culture data. AKP elevated but overall trend down. Relegated to treat with long term antibiotics vs. liver abscesses here. NO good PO options. Discussed with daughter by phone, wants mom home, not in facility.    now with rash ? due to ertapenem
68F with multiple medical issues including CAD, DM2, ESRD/HD, ESBL E coli septicemia from cholecystitis/cholangitis now admitted for low abdominal pain incidentially found to have liver lesions concerning for abscesses. However hard to attribute her pain at presentation to liver lesions given very disparate locations. Would expect referred pain to shoulder, not bilateral lower abdomen. They are like too small and/or inaccessible to drainage attempts. WBC normal without left shift, and no fever. No new culture data. AKP elevated but overall trend down. Relegated to treat with long term antibiotics vs. liver abscesses here. NO good PO options. Discussed with daughter by phone, wants mom home, not in facility.    now with rash ? due to ertapenem  tolerating tigecycline
69 obese female with a history of HTN, HLD, DM, Obesity, ESRD on HD now admitted from dialysis unit with abdominal pain and found to have multiple hepatic lesions, most likely abscesses on the CT scan. stable dialysis via perma cath. will follow.
69 yo female with hepatic abscesses       cont conservative management with iabx
69 yo with hepatic abscesses      cont abx       cleared for d/C from gen surg standpoint
69 yo with hepatic abscesses     cont antibiotic as per ID
69 yo with hepatic abscesses    cont abx
69 yo with multiple liver lesions, concerning for abscesses     cont antibiotics       no acute surgical issues needed
69yo Roberto speaking obese F with PMH of CAD s/p stent (2007), ESRD on HD (MWF), DM type 2, HTN, recent admission for severe sepsis with ESBL E coli bacteremia due to ascending cholangitis with CBD stone, s/p stone extraction and stent placement in [facility], now presents with lower abd pain and found to have new liver lesions suspicious for abscesses, a/w liver abscesses c/b acute urinary retention.
69yo Roberto speaking obese F with PMH of CAD s/p stent (2007), ESRD on HD (MWF), DM type 2, HTN, recent admission for severe sepsis with ESBL E coli bacteremia due to ascending cholangitis with CBD stone, s/p stone extraction and stent placement in [facility], now presents with lower abd pain and found to have new liver lesions suspicious for abscesses, a/w liver abscesses c/b acute urinary retention.
69yo Roberto speaking obese F with PMH of CAD s/p stent (2007), ESRD on HD (MWF), DM type 2, HTN, recent admission for severe sepsis with ESBL E coli bacteremia due to ascending cholangitis with CBD stone, s/p stone extraction and stent placement in [facility], now presents with lower abd pain and found to have new liver lesions suspicious for abscesses, a/w liver abscesses c/b urinary retention.
70 y/o f with pmhx of htn, hld, obesity, HD dependant, DM 2 on basilglar, s/p cholecystotomy tube removal with biliary stent placement at [facility] p/w abdominal pain to ER. gi consulted for liver abscesses on ct
70 y/o f with pmhx of htn, hld, obesity, HD dependant, DM 2 on basilglar, s/p cholecystotomy tube removal with biliary stent placement at [facility] p/w abdominal pain to ER. gi consulted for liver abscesses on ct
70 y/o f with pmhx of htn, hld, obesity, HD dependant, DM 2 on basilglar, s/p cholecystotomy tube removal with biliary stent placement at [facility] p/w abdominal pain to ER. gi consulted for liver abscesses on ct
PLAN will check with ID
·	ESRD on HD  ·	s/p recent ERCP and CBD stone extraction @ [facility]  ·	Diabetes  ·	Hypertension  ·	Abd pain, Liver abscess  ·	Urinary retention  ·	Anemia    HD today. To continue HD TIW as scheduled. Fluid removal as tolerated by BP. Dietary and PO fluid restriction.   Epogen as needed for anemia. Monitor BP trend. Titrate BP meds as needed. Salt restriction. ID follow up. On abx.   Monitor blood sugar levels. Insulin coverage as needed. D/c planning.
·	ESRD on HD  ·	s/p recent ERCP and CBD stone extraction @ [facility]  ·	Diabetes  ·	Hypertension  ·	Abd pain, Liver abscess  ·	Urinary retention  ·	Anemia    HD today. To continue HD TIW as scheduled. Fluid removal as tolerated by BP. Dietary and PO fluid restriction.   Epogen as needed for anemia. Monitor BP trend. Titrate BP meds as needed. Salt restriction. ID follow up. On abx.   Monitor blood sugar levels. Insulin coverage as needed. D/c planning.
·	ESRD on HD  ·	s/p recent ERCP and CBD stone extraction @ [facility]  ·	Diabetes  ·	Hypertension  ·	Abd pain, Liver abscess  ·	Urinary retention  ·	Anemia    HD tomorrow. To continue HD TIW as scheduled. Fluid removal as tolerated by BP. Dietary and PO fluid restriction.   Epogen as needed for anemia. Monitor BP trend. Titrate BP meds as needed. Salt restriction. ID follow up. On abx.   Monitor blood sugar levels. Insulin coverage as needed. D/c planning.
·	ESRD on HD  ·	s/p recent ERCP and CBD stone extraction @ [facility]  ·	Diabetes  ·	Hypertension  ·	Abd pain, Liver abscess  ·	Urinary retention  ·	Anemia    HD today. To continue HD TIW as scheduled. Fluid removal as tolerated by BP. Dietary and PO fluid restriction.   Epogen as needed for anemia. Monitor BP trend. Titrate BP meds as needed. Salt restriction. ID follow up. On abx.   Monitor blood sugar levels. Insulin coverage as needed. D/c planning.
·	ESRD on HD  ·	s/p recent ERCP and CBD stone extraction @ [facility]  ·	Diabetes  ·	Hypertension  ·	Abd pain, Liver abscess  ·	Urinary retention    HD tomorrow. To continue HD TIW as scheduled. Fluid removal as tolerated by BP. Dietary and PO fluid restriction.   Epogen as needed for anemia. Monitor BP trend. Titrate BP meds as needed. Salt restriction. ID follow up.   Monitor blood sugar levels. Insulin coverage as needed. [place] for retention.  follow up.
67yo Roberto speaking obese F with PMH of CAD s/p stent (2007), ESRD on HD (MWF), DM type 2, HTN, recent admission for severe sepsis with ESBL E coli bacteremia due to ascending cholangitis with CBD stone, s/p stone extraction and stent placement in [facility], now presents with lower abd pain and found to have new liver lesions suspicious for abscesses, a/w liver abscesses c/b acute urinary retention.
70 y/o f with pmhx of htn, hld, obesity, HD dependant, DM 2 on basilglar, s/p cholecystotomy tube removal with biliary stent placement at [facility] p/w abdominal pain to ER. For dialysis today. SW working on discharge planning and placement. Will follow.
70 y/o f with pmhx of htn, hld, obesity, HD dependant, DM 2 on basilglar, s/p cholecystotomy tube removal with biliary stent placement at [facility] p/w abdominal pain to ER. gi consulted for liver abscesses on ct
68 y/o f with pmhx of htn, hld, obesity, HD dependant, DM 2 on basilglar, s/p cholecystotomy tube removal with biliary stent placement at [facility] p/w abdominal pain to ER. gi consulted for liver abscesses on ct
68 y/o f with pmhx of htn, hld, obesity, HD dependant, DM 2 on basilglar, s/p cholecystotomy tube removal with biliary stent placement at [facility] p/w abdominal pain to ER. gi consulted for liver abscesses on ct

## 2018-12-07 NOTE — PHARMACOTHERAPY INTERVENTION NOTE - COMMENTS
Patient receiving epoetin with dialysis.  Dialysis due today, last CBC/BMP drawn on 12/5.  Reached out to Dr. Terry about drawing a stat CBC/BMP to assess patient's Hgb/Hct levels.  He agreed and the order was entered.

## 2018-12-07 NOTE — PROGRESS NOTE ADULT - REASON FOR ADMISSION
abdominal pain
abdominal pain,
abdominal pain

## 2018-12-07 NOTE — PROGRESS NOTE ADULT - SUBJECTIVE AND OBJECTIVE BOX
CLAUDIA IRENE  68y  Female    Patient is a 68y old  Female who presents with a chief complaint of abdominal pain (07 Dec 2018 11:06)    comfortable.   denies any chest pain or shortness of breath.            PAST MEDICAL & SURGICAL HISTORY:  ESRD (end stage renal disease) on dialysis: MWF  CAD (coronary artery disease): s/p stent in 2007  Diabetes  Myocardial infarction  Hypertension  H/O: hysterectomy          PHYSICAL EXAM:    T(C): 36.4 (12-07-18 @ 13:05), Max: 37.1 (12-07-18 @ 05:06)  HR: 70 (12-07-18 @ 13:05) (68 - 74)  BP: 160/74 (12-07-18 @ 13:05) (138/68 - 168/80)  RR: 18 (12-07-18 @ 13:05) (16 - 18)  SpO2: 97% (12-07-18 @ 13:05) (95% - 97%)  Wt(kg): --    I&O's Detail    06 Dec 2018 07:01  -  07 Dec 2018 07:00  --------------------------------------------------------  IN:    Oral Fluid: 420 mL  Total IN: 420 mL    OUT:  Total OUT: 0 mL    Total NET: 420 mL          Respiratory: clear anteriorly, decreased BS at bases  Cardiovascular: S1 S2  Gastrointestinal: soft NT ND +BS  Extremities: edema   Neuro: Awake and alert    MEDICATIONS  (STANDING):  aspirin enteric coated 81 milliGRAM(s) Oral daily  atorvastatin 80 milliGRAM(s) Oral at bedtime  chlorhexidine 2% Cloths 1 Application(s) Topical daily  cholestyramine Powder (Sugar-Free) 4 Gram(s) Oral daily  clopidogrel Tablet 75 milliGRAM(s) Oral daily  dextrose 5%. 1000 milliLiter(s) (50 mL/Hr) IV Continuous <Continuous>  dextrose 50% Injectable 12.5 Gram(s) IV Push once  dextrose 50% Injectable 25 Gram(s) IV Push once  dextrose 50% Injectable 25 Gram(s) IV Push once  epoetin analilia Injectable 95016 Unit(s) IV Push <User Schedule>  famotidine    Tablet 20 milliGRAM(s) Oral once  folic acid 1 milliGRAM(s) Oral daily  gabapentin 300 milliGRAM(s) Oral two times a day  insulin glargine Injectable (LANTUS) 5 Unit(s) SubCutaneous at bedtime  insulin lispro (HumaLOG) corrective regimen sliding scale   SubCutaneous three times a day before meals  insulin lispro (HumaLOG) corrective regimen sliding scale   SubCutaneous at bedtime  isosorbide   mononitrate ER Tablet (IMDUR) 30 milliGRAM(s) Oral daily  lactobacillus acidophilus 1 Tablet(s) Oral three times a day with meals  metoprolol tartrate 50 milliGRAM(s) Oral two times a day  NIFEdipine XL 60 milliGRAM(s) Oral daily  pantoprazole    Tablet 40 milliGRAM(s) Oral before breakfast  tamsulosin 0.4 milliGRAM(s) Oral at bedtime  tigecycline IVPB      tigecycline IVPB 50 milliGRAM(s) IV Intermittent every 12 hours    MEDICATIONS  (PRN):  dextrose 40% Gel 15 Gram(s) Oral once PRN Blood Glucose LESS THAN 70 milliGRAM(s)/deciliter  diphenhydrAMINE 25 milliGRAM(s) Oral every 6 hours PRN Rash and/or Itching  glucagon  Injectable 1 milliGRAM(s) IntraMuscular once PRN Glucose LESS THAN 70 milligrams/deciliter  hydrocortisone 1% Cream 1 Application(s) Topical two times a day PRN Rash and/or Itching  ondansetron Injectable 4 milliGRAM(s) IV Push every 6 hours PRN Nausea  simethicone 80 milliGRAM(s) Chew three times a day PRN Gas                            9.0    12.50 )-----------( 386      ( 07 Dec 2018 13:37 )             29.6       12-07    135  |  99  |  62<H>  ----------------------------<  160<H>  4.6   |  29  |  5.40<H>    Ca    7.6<L>      07 Dec 2018 13:37

## 2018-12-07 NOTE — PROGRESS NOTE ADULT - PROBLEM SELECTOR PLAN 2
see above
see above
-pt's lack of urine output and lower abdominal / suprapubic pain / tenderness raised concern for acute urinary retention (though pt has ESRD, she reports urinating ~5x per day at baseline)   -placed aguilar catheter and >600cc drained with improvement in pt's abd pain  -urology recs appreciated  -started on flomax  -f/up UCx
-pt's lack of urine output and lower abdominal / suprapubic pain / tenderness raised concern for acute urinary retention (though pt has ESRD, she reports urinating ~5x per day at baseline)   -placed aguilar catheter and >600cc drained with improvement in pt's abd pain  -will consult urology  -f/up UCx  -unclear etiology of retention
-pt's lack of urine output and lower abdominal / suprapubic pain / tenderness raised concern for acute urinary retention (though pt has ESRD, she reports urinating ~5x per day at baseline)   -urinary retention was confirmed with placement of a aguilar catheter and >600cc drained with improvement in pt's abd pain  -pt's abdominal pain chief complaint on admission was due to urinary retention and not the liver abscesses given the location of her pain and relief with emptying of the bladder.  -urology recs appreciated
-pt's lack of urine output and lower abdominal / suprapubic pain / tenderness raised concern for acute urinary retention (though pt has ESRD, she reports urinating ~5x per day at baseline)   -urinary retention was confirmed with placement of a aguilar catheter and >600cc drained with improvement in pt's abd pain  -pt's abdominal pain chief complaint on admission was due to urinary retention and not the liver abscesses given the location of her pain and relief with emptying of the bladder.  -urology recs appreciated  -started on flomax -- TOV today
-pt's lack of urine output and lower abdominal / suprapubic pain / tenderness raised concern for acute urinary retention (though pt has ESRD, she reports urinating ~5x per day at baseline)   -urinary retention was confirmed with placement of a aguilar catheter and >600cc drained with improvement in pt's abd pain  -pt's abdominal pain chief complaint on admission was due to urinary retention and not the liver abscesses given the location of her pain and relief with emptying of the bladder.  -urology recs appreciated  -started on flomax -- rec TOV tomorrow morning
could be antibx  will stop ertapenem  change to tigecycline  cont other supportive care
monitor, no SJS/TEN
see above
-pt's lack of urine output and lower abdominal / suprapubic pain / tenderness raised concern for acute urinary retention (though pt has ESRD, she reports urinating ~5x per day at baseline)   -urinary retention was confirmed with placement of a aguilar catheter and >600cc drained with improvement in pt's abd pain  -pt's abdominal pain chief complaint on admission was due to urinary retention and not the liver abscesses given the location of her pain and relief with emptying of the bladder.  -urology recs appreciated
see above

## 2018-12-07 NOTE — PROGRESS NOTE ADULT - PROBLEM SELECTOR PLAN 4
no new labs to assess  likely multifactorial, acute illness, chronic dz, esrd  around baseline from previous admission  prior hgb low but stable  no overt s/s gib  trend h/h, transfuse prn  cont ppi, on epo  iron studies cw aocd  hold anti plts in setting of active gib  will follow

## 2018-12-07 NOTE — PROGRESS NOTE ADULT - PROBLEM SELECTOR PLAN 1
resolved  recent admission for cholecystitis, sp drainage by ir  sp ercp with stone removal and cbd stent placement  CT w new liver lesions suspicious for developing abscesses  MRI w acute, or acute on chronic calculus cholecystitis, no biliary dilatation or cbd stone, and probable small abscesses  abx per id, sp picc line, to complete ~6wks  surgery eval noted, cleared for dc  bld cxs ngtd   trend lfts  serial abd exams  diet as tolerated  dc planning in progress per primary team
-CT and MRI concerning for new liver abscesses which is likely due to the very recent ascending cholangitis with ESBL E coli cultured in the biliary fluid and bacteremia.  -abscesses are small, pt does not have signs of systemic infection (Afebrile, no leukocytosis)  -c/w ertapenem   -ID recs appreciated -- rec 6 weeks of ertapenem.   -BCx negative x24 hours so far; if negative 48 hours, will pursue PICC line placement and outpt Abx  -the GB wall findings on imaging is likely related to very recent cholecystitis/cholangitis. Pt has no pain or tenderness in RUQ, no leukocytosis. C/w ertapenem given liver abscesses. Consider surgery consult though acute cholecystitis seems unlikely.  -GI and ID recs appreciated
-CT and MRI concerning for new liver abscesses which is likely due to the very recent ascending cholangitis with ESBL E coli cultured in the biliary fluid and bacteremia.  -abscesses are small, pt does not have signs of systemic infection (Afebrile, no leukocytosis)  -c/w ertapenem (given after HD today at ~3PM), moving the dose up toward the evening as pt will be taking it ~6:30PM if she were to go home  -ID recs appreciated -- rec 6 weeks of ertapenem.   -BCx negative x48 hours this evening, will pursue PICC line placement tomorrow so pt can continue her Abx as outpt.   -the GB wall findings on imaging is likely related to very recent cholecystitis/cholangitis. Pt has no pain or tenderness in RUQ, no leukocytosis. C/w ertapenem given liver abscesses. Consulted surgery, Dr. Campos, who recommended just IV Abx.   -GI and ID recs appreciated
-CT and MRI concerning for new liver abscesses which is likely due to the very recent ascending cholangitis with ESBL E coli cultured in the biliary fluid and bacteremia.  -abscesses are small, pt does not have signs of systemic infection (Afebrile, no leukocytosis)  -c/w ertapenem (given at ~5PM today), moving the dose up toward the evening as pt will be taking it ~6:30PM if she were to go home  -ID recs appreciated -- rec 6 weeks of ertapenem.   -BCx negative >48 hours; pt had tunnelled PICC line placed in the left IJ so she can continue her Abx as outpt. IJ was used in order to spare arms for HD.   -the GB wall findings on imaging is likely related to very recent cholecystitis/cholangitis. Pt has no pain or tenderness in RUQ, no leukocytosis. C/w ertapenem given liver abscesses. Consulted surgery, Dr. Campos, who recommended just IV Abx.   -GI and ID recs appreciated
-CT and MRI concerning for new liver abscesses which is likely due to the very recent ascending cholangitis with ESBL E coli cultured in the biliary fluid and bacteremia.  -abscesses are small, pt does not have signs of systemic infection (Afebrile, no leukocytosis)  -c/w ertapenem, moving the dose up toward the evening as pt will be taking it ~6:30PM if she were to go home  -ID recs appreciated -- rec 6 weeks of ertapenem.   -BCx negative >48 hours; pt had tunnelled PICC line placed in the left IJ so she can continue her Abx as outpt. IJ was used in order to spare arms for HD.   -the GB wall findings on imaging is likely related to very recent cholecystitis/cholangitis. Pt has no pain or tenderness in RUQ, no leukocytosis. C/w ertapenem given liver abscesses. Consulted surgery, Dr. Campos, who recommended just IV Abx.   -GI and ID recs appreciated
-CT and MRI concerning for new liver abscesses which is likely due to the very recent ascending cholangitis with ESBL E coli cultured in the biliary fluid and bacteremia.  -abscesses are small, pt does not have signs of systemic infection (Afebrile, no leukocytosis)  pt has utarica and generalized itching contributed to ertrapenem  changed   continue on tigecyyle
-CT and MRI concerning for new liver abscesses which is likely due to the very recent ascending cholangitis with ESBL E coli cultured in the biliary fluid and bacteremia.  -abscesses are small, pt does not have signs of systemic infection (Afebrile, no leukocytosis)  pt has utarica and generalized itching contributed to ertrapenem  changed   continue on tigecyyle  d/c planning crystal
-CT and MRI concerning for new liver abscesses which is likely due to the very recent ascending cholangitis with ESBL E coli cultured in the biliary fluid and bacteremia.  -abscesses are small, pt does not have signs of systemic infection (Afebrile, no leukocytosis)  pt has utarica and generalized itching contributed to ertrapenem  changed   continue on tigecyyle  d/c planning crystal once authorized
recent admission for cholecystitis, sp drainage by ir  sp ercp with stone removal and cbd stent placement  CT now showing new liver lesions suspicious for developing abscesses  am labs pending  abd pain improved  MRI w acute, or acute on chronic calculus cholecystitis, no biliary dilatation or cbd stone, and probable small abscesses  cont abx per id  consider surgical eval  f/u cxs, ngtd thus far  trend lfts, fever curve  serial abd exams  bowel regimen  pain control prn  diet as tolerated  will follow
resolved  recent admission for cholecystitis, sp drainage by ir  sp ercp with stone removal and cbd stent placement  CT w new liver lesions suspicious for developing abscesses  MRI w acute, or acute on chronic calculus cholecystitis, no biliary dilatation or cbd stone, and probable small abscesses  abx per id, sp picc line, to complete ~6wks  surgery eval noted  bld cxs ngtd   trend lfts  serial abd exams  pain control prn  diet as tolerated  dc planning in progress per primary team
resolved  recent admission for cholecystitis, sp drainage by ir  sp ercp with stone removal and cbd stent placement  CT w new liver lesions suspicious for developing abscesses  MRI w acute, or acute on chronic calculus cholecystitis, no biliary dilatation or cbd stone, and probable small abscesses  abx per id, sp picc line, to complete ~6wks  surgery eval noted  bld cxs ngtd   trend lfts  serial abd exams  pain control prn  diet as tolerated  dc planning in progress per primary team
resolved  recent admission for cholecystitis, sp drainage by ir  sp ercp with stone removal and cbd stent placement  CT w new liver lesions suspicious for developing abscesses  MRI w acute, or acute on chronic calculus cholecystitis, no biliary dilatation or cbd stone, and probable small abscesses  abx per id, sp picc line, to complete ~6wks  surgery eval noted, cleared for dc  bld cxs ngtd   trend lfts  serial abd exams  diet as tolerated  dc planning in progress per primary team
resolved  recent admission for cholecystitis, sp drainage by ir  sp ercp with stone removal and cbd stent placement  CT w new liver lesions suspicious for developing abscesses  MRI w acute, or acute on chronic calculus cholecystitis, no biliary dilatation or cbd stone, and probable small abscesses  cont abx per id, sp picc line, to complete ~6wks  surgery eval noted  bld cxs ngtd   trend lfts  serial abd exams  bowel regimen  pain control prn  diet as tolerated  dc planning in progress per primary team
resolved  recent admission for cholecystitis, sp drainage by ir  sp ercp with stone removal and cbd stent placement  CT w new liver lesions suspicious for developing abscesses  MRI w acute, or acute on chronic calculus cholecystitis, no biliary dilatation or cbd stone, and probable small abscesses  cont abx per id, sp picc line, to complete ~6wks  surgery eval noted  bld cxs ngtd   trend lfts  serial abd exams  bowel regimen  pain control prn  diet as tolerated  will follow
resolving  recent admission for cholecystitis, sp drainage by ir  sp ercp with stone removal and cbd stent placement  CT w new liver lesions suspicious for developing abscesses  am labs pending  MRI w acute, or acute on chronic calculus cholecystitis, no biliary dilatation or cbd stone, and probable small abscesses  cont abx per id  surgery recs noted  f/u cxs, ngtd thus far  trend lfts, fever curve  serial abd exams  bowel regimen  pain control prn  diet as tolerated  will follow
will need antibx for 6 weeks
will need antibx for 6 weeks as per prior notes  weekly labs
-CT and MRI concerning for new liver abscesses which is likely due to the very recent ascending cholangitis with ESBL E coli cultured in the biliary fluid and bacteremia.  -abscesses are small, pt does not have signs of systemic infection (Afebrile, no leukocytosis)  -c/w ertapenem, moving the dose up toward the evening as pt will be taking it ~6:30PM if she were to go home  -ID recs appreciated -- rec 6 weeks of ertapenem.   -BCx negative >48 hours; pt had tunnelled PICC line placed in the left IJ so she can continue her Abx as outpt. IJ was used in order to spare arms for HD.   -the GB wall findings on imaging is likely related to very recent cholecystitis/cholangitis. Pt has no pain or tenderness in RUQ, no leukocytosis. C/w ertapenem given liver abscesses. Consulted surgery, Dr. Campos, who recommended just IV Abx.   -GI and ID recs appreciated
resolved  recent admission for cholecystitis, sp drainage by ir  sp ercp with stone removal and cbd stent placement  CT w new liver lesions suspicious for developing abscesses  MRI w acute, or acute on chronic calculus cholecystitis, no biliary dilatation or cbd stone, and probable small abscesses  cont abx per id, sp picc line, to complete ~6wks  surgery eval noted  bld cxs ngtd   trend lfts  serial abd exams  monitor stool output, hold bowel regimen if w loose stools, cont bacid, if persists send stool studies  pain control prn  diet as tolerated  dc planning in progress per primary team
resolved  recent admission for cholecystitis, sp drainage by ir  sp ercp with stone removal and cbd stent placement  CT w new liver lesions suspicious for developing abscesses  MRI w acute, or acute on chronic calculus cholecystitis, no biliary dilatation or cbd stone, and probable small abscesses  abx per id, sp picc line, to complete ~6wks  surgery eval noted  bld cxs ngtd   trend lfts  serial abd exams  diet as tolerated  dc planning in progress per primary team

## 2018-12-07 NOTE — PROGRESS NOTE ADULT - SUBJECTIVE AND OBJECTIVE BOX
INTERVAL HPI/OVERNIGHT EVENTS:  pt seen and examined  denies n/v/abd pain  per overnight rn no acute gi issues  afebrile overnight no new labs to assess    MEDICATIONS  (STANDING):  aspirin enteric coated 81 milliGRAM(s) Oral daily  atorvastatin 80 milliGRAM(s) Oral at bedtime  chlorhexidine 2% Cloths 1 Application(s) Topical daily  cholestyramine Powder (Sugar-Free) 4 Gram(s) Oral daily  clopidogrel Tablet 75 milliGRAM(s) Oral daily  dextrose 5%. 1000 milliLiter(s) (50 mL/Hr) IV Continuous <Continuous>  dextrose 50% Injectable 12.5 Gram(s) IV Push once  dextrose 50% Injectable 25 Gram(s) IV Push once  dextrose 50% Injectable 25 Gram(s) IV Push once  epoetin analilia Injectable 25917 Unit(s) IV Push <User Schedule>  famotidine    Tablet 20 milliGRAM(s) Oral once  folic acid 1 milliGRAM(s) Oral daily  gabapentin 300 milliGRAM(s) Oral two times a day  insulin glargine Injectable (LANTUS) 5 Unit(s) SubCutaneous at bedtime  insulin lispro (HumaLOG) corrective regimen sliding scale   SubCutaneous three times a day before meals  insulin lispro (HumaLOG) corrective regimen sliding scale   SubCutaneous at bedtime  isosorbide   mononitrate ER Tablet (IMDUR) 30 milliGRAM(s) Oral daily  lactobacillus acidophilus 1 Tablet(s) Oral three times a day with meals  metoprolol tartrate 50 milliGRAM(s) Oral two times a day  NIFEdipine XL 60 milliGRAM(s) Oral daily  pantoprazole    Tablet 40 milliGRAM(s) Oral before breakfast  tamsulosin 0.4 milliGRAM(s) Oral at bedtime  tigecycline IVPB      tigecycline IVPB 50 milliGRAM(s) IV Intermittent every 12 hours    MEDICATIONS  (PRN):  dextrose 40% Gel 15 Gram(s) Oral once PRN Blood Glucose LESS THAN 70 milliGRAM(s)/deciliter  diphenhydrAMINE 25 milliGRAM(s) Oral every 6 hours PRN Rash and/or Itching  glucagon  Injectable 1 milliGRAM(s) IntraMuscular once PRN Glucose LESS THAN 70 milligrams/deciliter  hydrocortisone 1% Cream 1 Application(s) Topical two times a day PRN Rash and/or Itching  ondansetron Injectable 4 milliGRAM(s) IV Push every 6 hours PRN Nausea  simethicone 80 milliGRAM(s) Chew three times a day PRN Gas      Allergies    ertapenem (Urticaria)  Purell (Rash)    Intolerances        Review of Systems:    General:  No wt loss, fevers, chills, night sweats, fatigue   Eyes:  Good vision, no reported pain  ENT:  No sore throat, pain, runny nose, dysphagia  CV:  No pain, palpitations, hypo/hypertension  Resp:  No dyspnea, cough, tachypnea, wheezing  GI:  No pain, No nausea, No vomiting, No diarrhea, No constipation, No weight loss, No fever, No pruritis, No rectal bleeding, No melena, No dysphagia  :  No pain, bleeding, incontinence, nocturia  Muscle:  No pain, weakness  Neuro:  No weakness, tingling, memory problems  Psych:  No fatigue, insomnia, mood problems, depression  Endocrine:  No polyuria, polydypsia, cold/heat intolerance  Heme:  No petechiae, ecchymosis, easy bruisability  Skin:  No rash, tattoos, scars, edema      Vital Signs Last 24 Hrs  T(C): 37.1 (07 Dec 2018 05:06), Max: 37.1 (07 Dec 2018 05:06)  T(F): 98.8 (07 Dec 2018 05:06), Max: 98.8 (07 Dec 2018 05:06)  HR: 74 (07 Dec 2018 05:06) (68 - 74)  BP: 168/80 (07 Dec 2018 05:06) (143/68 - 168/80)  BP(mean): --  RR: 16 (07 Dec 2018 05:06) (16 - 16)  SpO2: 95% (07 Dec 2018 05:06) (94% - 96%)    PHYSICAL EXAM:    Constitutional: NAD  HEENT:  NC/AT  Chest: dec bs  Cardiovascular:  Regular rhythm, S1, S2  Abdomen: obese abd soft nt mild dt  Extremities:  mild edema  Skin:  no rash  Neuro/Psych:  Awake alert responds appropriately      LABS:                        8.6    11.20 )-----------( 359      ( 05 Dec 2018 10:07 )             28.1     12-05    134<L>  |  96  |  50<H>  ----------------------------<  211<H>  4.0   |  30  |  5.00<H>    Ca    7.4<L>      05 Dec 2018 10:07            RADIOLOGY & ADDITIONAL TESTS:

## 2018-12-07 NOTE — PROGRESS NOTE ADULT - PROBLEM SELECTOR PROBLEM 2
Liver abscess
Acute urinary retention
Liver abscess
Rash
Rash
Acute urinary retention
Liver abscess
Liver abscess

## 2018-12-07 NOTE — PROGRESS NOTE ADULT - SUBJECTIVE AND OBJECTIVE BOX
pt seen  no complaints  ICU Vital Signs Last 24 Hrs  T(C): 37.1 (07 Dec 2018 05:06), Max: 37.1 (07 Dec 2018 05:06)  T(F): 98.8 (07 Dec 2018 05:06), Max: 98.8 (07 Dec 2018 05:06)  HR: 74 (07 Dec 2018 05:06) (68 - 74)  BP: 168/80 (07 Dec 2018 05:06) (143/68 - 168/80)  BP(mean): --  ABP: --  ABP(mean): --  RR: 16 (07 Dec 2018 05:06) (16 - 16)  SpO2: 95% (07 Dec 2018 05:06) (94% - 96%)  gen-NAD  resp-clear   abd-soft NT/ND                          8.6    11.20 )-----------( 359      ( 05 Dec 2018 10:07 )             28.1   12-05    134<L>  |  96  |  50<H>  ----------------------------<  211<H>  4.0   |  30  |  5.00<H>    Ca    7.4<L>      05 Dec 2018 10:07
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
## 2018-12-07 NOTE — PROGRESS NOTE ADULT - PROVIDER SPECIALTY LIST ADULT
Gastroenterology
Hospitalist
Infectious Disease
Intervent Radiology
Nephrology
Surgery
Urology
Urology
Nephrology
Gastroenterology
Gastroenterology

## 2018-12-07 NOTE — PROGRESS NOTE ADULT - PROBLEM SELECTOR PROBLEM 1
Abdominal pain
Liver abscess
Abdominal pain

## 2018-12-26 ENCOUNTER — APPOINTMENT (OUTPATIENT)
Dept: GASTROENTEROLOGY | Facility: HOSPITAL | Age: 68
End: 2018-12-26

## 2018-12-26 DIAGNOSIS — K80.50 CALCULUS OF BILE DUCT W/OUT CHOLANGITIS OR CHOLECYSTITIS W/OUT OBSTRUCTION: ICD-10-CM

## 2018-12-26 PROBLEM — Z00.00 ENCOUNTER FOR PREVENTIVE HEALTH EXAMINATION: Status: ACTIVE | Noted: 2018-12-26

## 2019-01-02 ENCOUNTER — INPATIENT (INPATIENT)
Facility: HOSPITAL | Age: 69
LOS: 11 days | Discharge: NURSING, REHAB OR LONG-TERM CARE | DRG: 871 | End: 2019-01-14
Attending: HOSPITALIST | Admitting: HOSPITALIST
Payer: MEDICARE

## 2019-01-02 VITALS
RESPIRATION RATE: 19 BRPM | WEIGHT: 186.95 LBS | HEART RATE: 83 BPM | OXYGEN SATURATION: 96 % | TEMPERATURE: 100 F | SYSTOLIC BLOOD PRESSURE: 127 MMHG | DIASTOLIC BLOOD PRESSURE: 72 MMHG | HEIGHT: 65 IN

## 2019-01-02 DIAGNOSIS — E11.9 TYPE 2 DIABETES MELLITUS WITHOUT COMPLICATIONS: ICD-10-CM

## 2019-01-02 DIAGNOSIS — N18.6 END STAGE RENAL DISEASE: ICD-10-CM

## 2019-01-02 DIAGNOSIS — I25.10 ATHEROSCLEROTIC HEART DISEASE OF NATIVE CORONARY ARTERY WITHOUT ANGINA PECTORIS: ICD-10-CM

## 2019-01-02 DIAGNOSIS — Z90.710 ACQUIRED ABSENCE OF BOTH CERVIX AND UTERUS: Chronic | ICD-10-CM

## 2019-01-02 DIAGNOSIS — K81.9 CHOLECYSTITIS, UNSPECIFIED: ICD-10-CM

## 2019-01-02 DIAGNOSIS — I10 ESSENTIAL (PRIMARY) HYPERTENSION: ICD-10-CM

## 2019-01-02 DIAGNOSIS — Z29.9 ENCOUNTER FOR PROPHYLACTIC MEASURES, UNSPECIFIED: ICD-10-CM

## 2019-01-02 DIAGNOSIS — R74.8 ABNORMAL LEVELS OF OTHER SERUM ENZYMES: ICD-10-CM

## 2019-01-02 DIAGNOSIS — A41.9 SEPSIS, UNSPECIFIED ORGANISM: ICD-10-CM

## 2019-01-02 DIAGNOSIS — K75.0 ABSCESS OF LIVER: ICD-10-CM

## 2019-01-02 LAB
ALBUMIN SERPL ELPH-MCNC: 1.3 G/DL — LOW (ref 3.3–5)
ALP SERPL-CCNC: 353 U/L — HIGH (ref 40–120)
ALT FLD-CCNC: 33 U/L — SIGNIFICANT CHANGE UP (ref 12–78)
ANION GAP SERPL CALC-SCNC: 12 MMOL/L — SIGNIFICANT CHANGE UP (ref 5–17)
AST SERPL-CCNC: 54 U/L — HIGH (ref 15–37)
BASOPHILS # BLD AUTO: 0 K/UL — SIGNIFICANT CHANGE UP (ref 0–0.2)
BASOPHILS NFR BLD AUTO: 0 % — SIGNIFICANT CHANGE UP (ref 0–2)
BILIRUB SERPL-MCNC: 0.6 MG/DL — SIGNIFICANT CHANGE UP (ref 0.2–1.2)
BUN SERPL-MCNC: 52 MG/DL — HIGH (ref 7–23)
CALCIUM SERPL-MCNC: 7.8 MG/DL — LOW (ref 8.5–10.1)
CHLORIDE SERPL-SCNC: 98 MMOL/L — SIGNIFICANT CHANGE UP (ref 96–108)
CK MB BLD-MCNC: 1.1 % — SIGNIFICANT CHANGE UP (ref 0–3.5)
CK MB CFR SERPL CALC: 1.7 NG/ML — SIGNIFICANT CHANGE UP (ref 0–3.6)
CK SERPL-CCNC: 148 U/L — SIGNIFICANT CHANGE UP (ref 26–192)
CO2 SERPL-SCNC: 27 MMOL/L — SIGNIFICANT CHANGE UP (ref 22–31)
CREAT SERPL-MCNC: 6 MG/DL — HIGH (ref 0.5–1.3)
EOSINOPHIL # BLD AUTO: 0 K/UL — SIGNIFICANT CHANGE UP (ref 0–0.5)
EOSINOPHIL NFR BLD AUTO: 0 % — SIGNIFICANT CHANGE UP (ref 0–6)
GLUCOSE SERPL-MCNC: 171 MG/DL — HIGH (ref 70–99)
HCT VFR BLD CALC: 36.9 % — SIGNIFICANT CHANGE UP (ref 34.5–45)
HGB BLD-MCNC: 11.7 G/DL — SIGNIFICANT CHANGE UP (ref 11.5–15.5)
LACTATE SERPL-SCNC: 1.5 MMOL/L — SIGNIFICANT CHANGE UP (ref 0.7–2)
LACTATE SERPL-SCNC: 2.8 MMOL/L — HIGH (ref 0.7–2)
LACTATE SERPL-SCNC: 4 MMOL/L — CRITICAL HIGH (ref 0.7–2)
LG PLATELETS BLD QL AUTO: SLIGHT — SIGNIFICANT CHANGE UP
LIDOCAIN IGE QN: 127 U/L — SIGNIFICANT CHANGE UP (ref 73–393)
LYMPHOCYTES # BLD AUTO: 0.69 K/UL — LOW (ref 1–3.3)
LYMPHOCYTES # BLD AUTO: 4 % — LOW (ref 13–44)
MANUAL SMEAR VERIFICATION: SIGNIFICANT CHANGE UP
MCHC RBC-ENTMCNC: 28.5 PG — SIGNIFICANT CHANGE UP (ref 27–34)
MCHC RBC-ENTMCNC: 31.7 GM/DL — LOW (ref 32–36)
MCV RBC AUTO: 89.8 FL — SIGNIFICANT CHANGE UP (ref 80–100)
MONOCYTES # BLD AUTO: 0.17 K/UL — SIGNIFICANT CHANGE UP (ref 0–0.9)
MONOCYTES NFR BLD AUTO: 1 % — LOW (ref 2–14)
NEUTROPHILS # BLD AUTO: 16.37 K/UL — HIGH (ref 1.8–7.4)
NEUTROPHILS NFR BLD AUTO: 82 % — HIGH (ref 43–77)
NEUTS BAND # BLD: 13 % — HIGH (ref 0–8)
NRBC # BLD: 0 — SIGNIFICANT CHANGE UP
NRBC # BLD: SIGNIFICANT CHANGE UP /100 WBCS (ref 0–0)
PLAT MORPH BLD: NORMAL — SIGNIFICANT CHANGE UP
PLATELET # BLD AUTO: 222 K/UL — SIGNIFICANT CHANGE UP (ref 150–400)
POLYCHROMASIA BLD QL SMEAR: SLIGHT — SIGNIFICANT CHANGE UP
POTASSIUM SERPL-MCNC: 4.2 MMOL/L — SIGNIFICANT CHANGE UP (ref 3.5–5.3)
POTASSIUM SERPL-SCNC: 4.2 MMOL/L — SIGNIFICANT CHANGE UP (ref 3.5–5.3)
PROT SERPL-MCNC: 6.4 G/DL — SIGNIFICANT CHANGE UP (ref 6–8.3)
RBC # BLD: 4.11 M/UL — SIGNIFICANT CHANGE UP (ref 3.8–5.2)
RBC # FLD: 23.5 % — HIGH (ref 10.3–14.5)
RBC BLD AUTO: SIGNIFICANT CHANGE UP
SMUDGE CELLS # BLD: PRESENT — SIGNIFICANT CHANGE UP
SODIUM SERPL-SCNC: 137 MMOL/L — SIGNIFICANT CHANGE UP (ref 135–145)
TARGETS BLD QL SMEAR: SLIGHT — SIGNIFICANT CHANGE UP
TROPONIN I SERPL-MCNC: 0.05 NG/ML — HIGH (ref 0.01–0.04)
WBC # BLD: 17.23 K/UL — HIGH (ref 3.8–10.5)
WBC # FLD AUTO: 17.23 K/UL — HIGH (ref 3.8–10.5)

## 2019-01-02 PROCEDURE — 99285 EMERGENCY DEPT VISIT HI MDM: CPT

## 2019-01-02 PROCEDURE — 74019 RADEX ABDOMEN 2 VIEWS: CPT | Mod: 26

## 2019-01-02 PROCEDURE — 71045 X-RAY EXAM CHEST 1 VIEW: CPT | Mod: 26

## 2019-01-02 PROCEDURE — 74177 CT ABD & PELVIS W/CONTRAST: CPT | Mod: 26

## 2019-01-02 PROCEDURE — 99223 1ST HOSP IP/OBS HIGH 75: CPT | Mod: AI,GC

## 2019-01-02 PROCEDURE — 93010 ELECTROCARDIOGRAM REPORT: CPT

## 2019-01-02 PROCEDURE — 71260 CT THORAX DX C+: CPT | Mod: 26

## 2019-01-02 RX ORDER — INSULIN LISPRO 100/ML
VIAL (ML) SUBCUTANEOUS
Qty: 0 | Refills: 0 | Status: DISCONTINUED | OUTPATIENT
Start: 2019-01-02 | End: 2019-01-14

## 2019-01-02 RX ORDER — SODIUM CHLORIDE 9 MG/ML
500 INJECTION INTRAMUSCULAR; INTRAVENOUS; SUBCUTANEOUS ONCE
Qty: 0 | Refills: 0 | Status: COMPLETED | OUTPATIENT
Start: 2019-01-02 | End: 2019-01-02

## 2019-01-02 RX ORDER — CIPROFLOXACIN LACTATE 400MG/40ML
400 VIAL (ML) INTRAVENOUS EVERY 12 HOURS
Qty: 0 | Refills: 0 | Status: DISCONTINUED | OUTPATIENT
Start: 2019-01-02 | End: 2019-01-03

## 2019-01-02 RX ORDER — MORPHINE SULFATE 50 MG/1
2 CAPSULE, EXTENDED RELEASE ORAL ONCE
Qty: 0 | Refills: 0 | Status: DISCONTINUED | OUTPATIENT
Start: 2019-01-02 | End: 2019-01-02

## 2019-01-02 RX ORDER — SODIUM CHLORIDE 9 MG/ML
1000 INJECTION INTRAMUSCULAR; INTRAVENOUS; SUBCUTANEOUS ONCE
Qty: 0 | Refills: 0 | Status: COMPLETED | OUTPATIENT
Start: 2019-01-02 | End: 2019-01-02

## 2019-01-02 RX ORDER — HYDROMORPHONE HYDROCHLORIDE 2 MG/ML
0.5 INJECTION INTRAMUSCULAR; INTRAVENOUS; SUBCUTANEOUS EVERY 6 HOURS
Qty: 0 | Refills: 0 | Status: DISCONTINUED | OUTPATIENT
Start: 2019-01-02 | End: 2019-01-07

## 2019-01-02 RX ORDER — CLOPIDOGREL BISULFATE 75 MG/1
75 TABLET, FILM COATED ORAL DAILY
Qty: 0 | Refills: 0 | Status: DISCONTINUED | OUTPATIENT
Start: 2019-01-02 | End: 2019-01-03

## 2019-01-02 RX ORDER — PIPERACILLIN AND TAZOBACTAM 4; .5 G/20ML; G/20ML
3.38 INJECTION, POWDER, LYOPHILIZED, FOR SOLUTION INTRAVENOUS EVERY 12 HOURS
Qty: 0 | Refills: 0 | Status: DISCONTINUED | OUTPATIENT
Start: 2019-01-02 | End: 2019-01-06

## 2019-01-02 RX ORDER — MORPHINE SULFATE 50 MG/1
1 CAPSULE, EXTENDED RELEASE ORAL ONCE
Qty: 0 | Refills: 0 | Status: DISCONTINUED | OUTPATIENT
Start: 2019-01-02 | End: 2019-01-02

## 2019-01-02 RX ORDER — DOCUSATE SODIUM 100 MG
100 CAPSULE ORAL DAILY
Qty: 0 | Refills: 0 | Status: DISCONTINUED | OUTPATIENT
Start: 2019-01-02 | End: 2019-01-08

## 2019-01-02 RX ORDER — SODIUM CHLORIDE 9 MG/ML
1000 INJECTION INTRAMUSCULAR; INTRAVENOUS; SUBCUTANEOUS
Qty: 0 | Refills: 0 | Status: DISCONTINUED | OUTPATIENT
Start: 2019-01-02 | End: 2019-01-02

## 2019-01-02 RX ORDER — DEXTROSE 50 % IN WATER 50 %
25 SYRINGE (ML) INTRAVENOUS ONCE
Qty: 0 | Refills: 0 | Status: DISCONTINUED | OUTPATIENT
Start: 2019-01-02 | End: 2019-01-14

## 2019-01-02 RX ORDER — GABAPENTIN 400 MG/1
300 CAPSULE ORAL
Qty: 0 | Refills: 0 | Status: DISCONTINUED | OUTPATIENT
Start: 2019-01-02 | End: 2019-01-10

## 2019-01-02 RX ORDER — DEXTROSE 50 % IN WATER 50 %
12.5 SYRINGE (ML) INTRAVENOUS ONCE
Qty: 0 | Refills: 0 | Status: DISCONTINUED | OUTPATIENT
Start: 2019-01-02 | End: 2019-01-14

## 2019-01-02 RX ORDER — SODIUM CHLORIDE 9 MG/ML
1000 INJECTION, SOLUTION INTRAVENOUS
Qty: 0 | Refills: 0 | Status: DISCONTINUED | OUTPATIENT
Start: 2019-01-02 | End: 2019-01-14

## 2019-01-02 RX ORDER — ISOSORBIDE MONONITRATE 60 MG/1
60 TABLET, EXTENDED RELEASE ORAL DAILY
Qty: 0 | Refills: 0 | Status: DISCONTINUED | OUTPATIENT
Start: 2019-01-02 | End: 2019-01-14

## 2019-01-02 RX ORDER — SODIUM CHLORIDE 9 MG/ML
3 INJECTION INTRAMUSCULAR; INTRAVENOUS; SUBCUTANEOUS ONCE
Qty: 0 | Refills: 0 | Status: COMPLETED | OUTPATIENT
Start: 2019-01-02 | End: 2019-01-02

## 2019-01-02 RX ORDER — HYDROMORPHONE HYDROCHLORIDE 2 MG/ML
1 INJECTION INTRAMUSCULAR; INTRAVENOUS; SUBCUTANEOUS ONCE
Qty: 0 | Refills: 0 | Status: DISCONTINUED | OUTPATIENT
Start: 2019-01-02 | End: 2019-01-02

## 2019-01-02 RX ORDER — PIPERACILLIN AND TAZOBACTAM 4; .5 G/20ML; G/20ML
4.5 INJECTION, POWDER, LYOPHILIZED, FOR SOLUTION INTRAVENOUS ONCE
Qty: 0 | Refills: 0 | Status: COMPLETED | OUTPATIENT
Start: 2019-01-02 | End: 2019-01-02

## 2019-01-02 RX ORDER — ISOSORBIDE MONONITRATE 60 MG/1
30 TABLET, EXTENDED RELEASE ORAL AT BEDTIME
Qty: 0 | Refills: 0 | Status: DISCONTINUED | OUTPATIENT
Start: 2019-01-02 | End: 2019-01-14

## 2019-01-02 RX ORDER — ACETAMINOPHEN 500 MG
650 TABLET ORAL ONCE
Qty: 0 | Refills: 0 | Status: COMPLETED | OUTPATIENT
Start: 2019-01-02 | End: 2019-01-02

## 2019-01-02 RX ORDER — ASPIRIN/CALCIUM CARB/MAGNESIUM 324 MG
81 TABLET ORAL DAILY
Qty: 0 | Refills: 0 | Status: DISCONTINUED | OUTPATIENT
Start: 2019-01-02 | End: 2019-01-14

## 2019-01-02 RX ORDER — NIFEDIPINE 30 MG
60 TABLET, EXTENDED RELEASE 24 HR ORAL DAILY
Qty: 0 | Refills: 0 | Status: DISCONTINUED | OUTPATIENT
Start: 2019-01-02 | End: 2019-01-14

## 2019-01-02 RX ORDER — HEPARIN SODIUM 5000 [USP'U]/ML
5000 INJECTION INTRAVENOUS; SUBCUTANEOUS EVERY 8 HOURS
Qty: 0 | Refills: 0 | Status: DISCONTINUED | OUTPATIENT
Start: 2019-01-02 | End: 2019-01-14

## 2019-01-02 RX ORDER — DEXTROSE 50 % IN WATER 50 %
15 SYRINGE (ML) INTRAVENOUS ONCE
Qty: 0 | Refills: 0 | Status: DISCONTINUED | OUTPATIENT
Start: 2019-01-02 | End: 2019-01-14

## 2019-01-02 RX ORDER — GLUCAGON INJECTION, SOLUTION 0.5 MG/.1ML
1 INJECTION, SOLUTION SUBCUTANEOUS ONCE
Qty: 0 | Refills: 0 | Status: DISCONTINUED | OUTPATIENT
Start: 2019-01-02 | End: 2019-01-14

## 2019-01-02 RX ORDER — SENNA PLUS 8.6 MG/1
2 TABLET ORAL AT BEDTIME
Qty: 0 | Refills: 0 | Status: DISCONTINUED | OUTPATIENT
Start: 2019-01-02 | End: 2019-01-08

## 2019-01-02 RX ORDER — HYDROMORPHONE HYDROCHLORIDE 2 MG/ML
1 INJECTION INTRAMUSCULAR; INTRAVENOUS; SUBCUTANEOUS EVERY 8 HOURS
Qty: 0 | Refills: 0 | Status: DISCONTINUED | OUTPATIENT
Start: 2019-01-02 | End: 2019-01-07

## 2019-01-02 RX ORDER — INSULIN LISPRO 100/ML
VIAL (ML) SUBCUTANEOUS AT BEDTIME
Qty: 0 | Refills: 0 | Status: DISCONTINUED | OUTPATIENT
Start: 2019-01-02 | End: 2019-01-14

## 2019-01-02 RX ORDER — ONDANSETRON 8 MG/1
4 TABLET, FILM COATED ORAL ONCE
Qty: 0 | Refills: 0 | Status: COMPLETED | OUTPATIENT
Start: 2019-01-02 | End: 2019-01-02

## 2019-01-02 RX ORDER — METOPROLOL TARTRATE 50 MG
50 TABLET ORAL
Qty: 0 | Refills: 0 | Status: DISCONTINUED | OUTPATIENT
Start: 2019-01-02 | End: 2019-01-14

## 2019-01-02 RX ORDER — ATORVASTATIN CALCIUM 80 MG/1
80 TABLET, FILM COATED ORAL AT BEDTIME
Qty: 0 | Refills: 0 | Status: DISCONTINUED | OUTPATIENT
Start: 2019-01-02 | End: 2019-01-14

## 2019-01-02 RX ORDER — SODIUM CHLORIDE 9 MG/ML
1000 INJECTION INTRAMUSCULAR; INTRAVENOUS; SUBCUTANEOUS
Qty: 0 | Refills: 0 | Status: DISCONTINUED | OUTPATIENT
Start: 2019-01-02 | End: 2019-01-06

## 2019-01-02 RX ADMIN — Medication 200 MILLIGRAM(S): at 16:30

## 2019-01-02 RX ADMIN — MORPHINE SULFATE 2 MILLIGRAM(S): 50 CAPSULE, EXTENDED RELEASE ORAL at 13:58

## 2019-01-02 RX ADMIN — SODIUM CHLORIDE 500 MILLILITER(S): 9 INJECTION INTRAMUSCULAR; INTRAVENOUS; SUBCUTANEOUS at 16:25

## 2019-01-02 RX ADMIN — ISOSORBIDE MONONITRATE 30 MILLIGRAM(S): 60 TABLET, EXTENDED RELEASE ORAL at 23:52

## 2019-01-02 RX ADMIN — ISOSORBIDE MONONITRATE 60 MILLIGRAM(S): 60 TABLET, EXTENDED RELEASE ORAL at 23:51

## 2019-01-02 RX ADMIN — Medication 650 MILLIGRAM(S): at 15:15

## 2019-01-02 RX ADMIN — HYDROMORPHONE HYDROCHLORIDE 1 MILLIGRAM(S): 2 INJECTION INTRAMUSCULAR; INTRAVENOUS; SUBCUTANEOUS at 20:35

## 2019-01-02 RX ADMIN — PIPERACILLIN AND TAZOBACTAM 200 GRAM(S): 4; .5 INJECTION, POWDER, LYOPHILIZED, FOR SOLUTION INTRAVENOUS at 15:25

## 2019-01-02 RX ADMIN — ATORVASTATIN CALCIUM 80 MILLIGRAM(S): 80 TABLET, FILM COATED ORAL at 23:51

## 2019-01-02 RX ADMIN — SODIUM CHLORIDE 3 MILLILITER(S): 9 INJECTION INTRAMUSCULAR; INTRAVENOUS; SUBCUTANEOUS at 13:30

## 2019-01-02 RX ADMIN — GABAPENTIN 300 MILLIGRAM(S): 400 CAPSULE ORAL at 19:10

## 2019-01-02 RX ADMIN — HYDROMORPHONE HYDROCHLORIDE 1 MILLIGRAM(S): 2 INJECTION INTRAMUSCULAR; INTRAVENOUS; SUBCUTANEOUS at 19:10

## 2019-01-02 RX ADMIN — SODIUM CHLORIDE 1000 MILLILITER(S): 9 INJECTION INTRAMUSCULAR; INTRAVENOUS; SUBCUTANEOUS at 13:55

## 2019-01-02 RX ADMIN — ONDANSETRON 4 MILLIGRAM(S): 8 TABLET, FILM COATED ORAL at 13:58

## 2019-01-02 RX ADMIN — SODIUM CHLORIDE 1000 MILLILITER(S): 9 INJECTION INTRAMUSCULAR; INTRAVENOUS; SUBCUTANEOUS at 15:20

## 2019-01-02 RX ADMIN — MORPHINE SULFATE 2 MILLIGRAM(S): 50 CAPSULE, EXTENDED RELEASE ORAL at 15:15

## 2019-01-02 RX ADMIN — HEPARIN SODIUM 5000 UNIT(S): 5000 INJECTION INTRAVENOUS; SUBCUTANEOUS at 23:55

## 2019-01-02 RX ADMIN — PIPERACILLIN AND TAZOBACTAM 4.5 GRAM(S): 4; .5 INJECTION, POWDER, LYOPHILIZED, FOR SOLUTION INTRAVENOUS at 16:20

## 2019-01-02 RX ADMIN — Medication 650 MILLIGRAM(S): at 13:10

## 2019-01-02 RX ADMIN — Medication 50 MILLIGRAM(S): at 19:10

## 2019-01-02 RX ADMIN — SENNA PLUS 2 TABLET(S): 8.6 TABLET ORAL at 23:51

## 2019-01-02 NOTE — CONSULT NOTE ADULT - ASSESSMENT
·	ESRD on HD  ·	Fever r/o Sepsis  ·	s/p recent ERCP and CBD stone extraction @ [facility], h/o Liver abscess  ·	Diabetes  ·	Hypertension    Fever work up in progress. For CT scan. Will arrange for hemodialysis. Pt requesting to do dialysis tomorrow.   To continue HD TIW as scheduled. Fluid removal as tolerated by BP. Dietary and PO fluid restriction.   Monitor BP trend. Titrate BP meds as needed. Salt restriction. ID evaluation. Check cultures, IV abx.    Monitor blood sugar levels. Insulin coverage as needed. D/w pt's grand daughter over the phone. Consent for HD obtained.   Further recommendations pending clinical course. Thank you for the courtesy of this referral.

## 2019-01-02 NOTE — H&P ADULT - PROBLEM SELECTOR PLAN 8
IMPROVE VTE Individual Risk Assessment          RISK                                                          Points    [  ] Previous VTE                                                3  [  ] Thrombophilia                                             2  [  ] Lower limb paralysis                                   2        (unable to hold up >15 seconds)    [  ] Current Cancer                                            2         (within 6 months)  [  ] Immobilization > 24 hrs                              1  [  ] ICU/CCU stay > 24 hours                            1  [ x ] Age > 60                                                    1    IMPROVE VTE Score ___1______  Padua Score: 3 IMPROVE VTE Individual Risk Assessment          RISK                                                          Points    [  ] Previous VTE                                                3  [  ] Thrombophilia                                             2  [  ] Lower limb paralysis                                   2        (unable to hold up >15 seconds)    [  ] Current Cancer                                            2         (within 6 months)  [  ] Immobilization > 24 hrs                              1  [  ] ICU/CCU stay > 24 hours                            1  [ x ] Age > 60                                                    1    IMPROVE VTE Score ___1______  Padua Score: 3    Heparin 5,000 q8 for VTE ppx 1st set troponins elevated  - f/u 2 sets Belle  - consult Cardio ([facility])

## 2019-01-02 NOTE — H&P ADULT - NSHPREVIEWOFSYSTEMS_GEN_ALL_CORE
Constitutional: admits to fever, chills, generalized weakness  Respiratory: denies SOB, cough, sputum production  Cardiovascular: denies CP, palpitations  Gastrointestinal: admits to nausea, vomiting, epigastric abdominal pain, hematochezia, denies diarrhea, constipation  Skin/Breast: denies rash, itching  Musculoskeletal: denies myalgias  Neurologic: denies headache, weakness, dizziness    ROS negative except as noted above Constitutional: admits to fever, chills, generalized weakness  Respiratory: denies SOB, cough, sputum production  Cardiovascular: denies CP, palpitations  Gastrointestinal: admits to nausea, vomiting, epigastric abdominal pain,denies diarrhea, constipation  Skin/Breast: denies rash, itching  Musculoskeletal: denies myalgias  Neurologic: denies headache, weakness, dizziness    ROS negative except as noted above

## 2019-01-02 NOTE — H&P ADULT - PROBLEM SELECTOR PLAN 6
-Hold home medications: basaglar 7u QHS  -Hypoglycemia Protocol, Low Dose Insulin Sliding Coverage, Accuchecks AC&HS.  -Diet: Consistent Carbs w/ Evening Snack.  -Follow up HbA1c. -Hold home medications: basaglar 7u QHS  -Hypoglycemia Protocol, Low Dose Insulin Sliding Coverage, Accuchecks AC&HS.  -Diet: NPO  -Follow up HbA1c.

## 2019-01-02 NOTE — H&P ADULT - PROBLEM SELECTOR PLAN 5
s/p stent 2007  c/w home dose atorvastatin, metoprolol (with hold parameters) clopidogrel and baby aspirin

## 2019-01-02 NOTE — ED PROVIDER NOTE - PROGRESS NOTE DETAILS
Case melinda Mcclure, readmit back to Hospitalist. As per Dr. Palacios, scheduled for dialysis tomorrow, sooner if necessary, aware of ct abd c iv contrast scheuduled to be done soon. Will repeat lactate p 1 L NS As per Dr. Albarran, scheduled for dialysis tomorrow, sooner if necessary, aware of ct abd c iv contrast scheuduled to be done soon. Will repeat lactate p 1 L NS case [facility] As per TONY Campos ordered

## 2019-01-02 NOTE — H&P ADULT - PROBLEM SELECTOR PLAN 2
- ?Acute on chronic  - c/w zosyn and cipro  - NPO   - Gen Surg consult?  - Pt is s/p biliary stent - ?Acute on chronic  - c/w abx pending ID recs  - f/u HIDA scan  - NPO   - Gen Surg (Andres) consulted  - Pt is s/p biliary stent  - Consult GI (Lea)

## 2019-01-02 NOTE — H&P ADULT - NSHPSOCIALHISTORY_GEN_ALL_CORE
Pt lives with son  Pt ambulates with cane    Pt denies EtOH, tobacco, drugs    Pt has not had colonoscopy

## 2019-01-02 NOTE — ED PROVIDER NOTE - PMH
Acute urinary retention    CAD (coronary artery disease)  s/p stent in 2007  Cholecystitis with cholangitis    Diabetes    ESRD (end stage renal disease) on dialysis  MWF  Hypertension    Liver abscess    Myocardial infarction

## 2019-01-02 NOTE — H&P ADULT - NSHPPHYSICALEXAM_GEN_ALL_CORE
Vital Signs (24 Hrs):  T(C): 36.8 (01-02-19 @ 15:15), Max: 38.7 (01-02-19 @ 13:10)  HR: 71 (01-02-19 @ 15:40) (71 - 83)  BP: 127/72 (01-02-19 @ 12:56) (127/72 - 127/72)  RR: 16 (01-02-19 @ 15:40) (16 - 19)  SpO2: 97% (01-02-19 @ 15:40) (96% - 97%)    Physical Exam:  General: Well developed, well nourished, NAD  HEENT: NCAT, EOMI bl, moist mucous membranes   Neck: Supple, nontender, no JVD  Neurology: A&Ox3, nonfocal, CN II-XII grossly intact   Respiratory: CTA B/L, No W/R/R  CV: RRR, +S1/S2, no murmurs, rubs or gallops  Abdominal: Soft, obese, RUQ tenderness to deep palpation, ND +BSx4  Extremities: No cyanosis or clubbing, +1 B/L LE pitting edema, + peripheral pulses  MSK: no joint erythema or warmth, no joint swelling   Skin: warm, dry, normal color

## 2019-01-02 NOTE — ED ADULT NURSE NOTE - OBJECTIVE STATEMENT
Pt to ED via ambulance sent by rehab facility for evaluation of abdominal pain, nausea, and vomiting.  Pt A&Ox4, states she began having pain yesterday, then it continued today and she vomited 4 times.  Pt denies diarrhea.  Pt c/o pain to epigastric/chest area, is tender to palpation in left upper epigastric area and also generalized lower abdominal area with pain greater upper than lower.  Pt denies diarrhea, had felt "cold" yesterday, arrives with rectal temp of 101.7 today. Pt has PICC line to left chest wall and dialysis tesio catheter to right chest wall. Pt to ED via ambulance sent by rehab facility for evaluation of abdominal pain, nausea, and vomiting.  Pt A&Ox4, states she began having pain yesterday, then it continued today and she vomited 4 times.  Pt denies diarrhea.  Pt c/o pain to epigastric/chest area, is tender to palpation in left upper epigastric area and also generalized lower abdominal area with pain greater upper than lower.  Pt denies diarrhea, had felt "cold" yesterday, arrives with rectal temp of 101.7 today. Pt has PICC line to left chest wall and dialysis tesio catheter to right chest wall. Pt had recent removal of cholecystectomy tube/biliary stent placement and sent for CT scan. Pt to ED via ambulance sent by rehab facility for evaluation of abdominal pain, nausea, and vomiting.  Pt A&Ox4, states she began having pain yesterday, then it continued today and she vomited 4 times.  Pt denies diarrhea.  Pt c/o pain to epigastric/chest area, is tender to palpation in left upper epigastric area and also generalized lower abdominal area with pain greater upper than lower.  Pt denies diarrhea, had felt "cold" yesterday, arrives with rectal temp of 101.7 today. Pt has PICC line (actually internal jugular central line per CT) to left chest wall and dialysis tesio catheter to right chest wall. Pt had recent removal of cholecystectomy tube/biliary stent placement and sent for CT scan.

## 2019-01-02 NOTE — ED ADULT NURSE NOTE - CHIEF COMPLAINT QUOTE
From [facility]. C/o of abdominal pain. Pt also c/o of nausea, vomiting and unable to tolerate PO. pt had history of gall bladder infection.

## 2019-01-02 NOTE — ED ADULT TRIAGE NOTE - CHIEF COMPLAINT QUOTE
From [facility]. C/o of abdominal pain. Pt also c/o of nausea, vomiting and unable to tolerate PO. pt had history of gall bladder infection.

## 2019-01-02 NOTE — ED PROVIDER NOTE - OBJECTIVE STATEMENT
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   Referred from [facility] for abd pain and vomited today.  As per pt, vomited x 4 since yesterday, complained lower chest and diffuse abd pain.  ho chills, but no fever.   2-3 pasty bm for past 2 days.  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   Referred from [facility] for abd epigastric pain and vomited today.  As per pt, vomited x 4 since yesterday, complained lower chest and diffuse abd pain p taking Tylenol yesterday.   ho chills, but no fever.   2-3 pasty bm for past 2 days.

## 2019-01-02 NOTE — H&P ADULT - PROBLEM SELECTOR PLAN 1
- Pt meets sepsis criteria (fever, leukocytosis, source) 2/2 acute on chronic cholecystitis vs failed abx tx of liver abscess  - admit to tele  - CT Chest/Abd/Pel - poss acute kevin on chronic kevin, decr conspicousity of liver abscess - sm abscess remains  - lactate 4.0, repeat lactate 2.8 s/p 1.5L NS  - tylenol prn for fever  - trend WBCs  - consult ID ([email or web address])   - c/w abx - zosyn and cipro - Pt meets sepsis criteria (fever, leukocytosis, source) 2/2 acute on chronic cholecystitis vs failed abx tx of liver abscess  - admit to tele  - CT Chest/Abd/Pel - poss acute kevin on chronic kevin, decr conspicousity of liver abscess - sm abscess remains  - lactate 4.0, repeat lactate 2.8 s/p 1.5L NS  - tylenol prn for fever  - trend WBCs  - consult ID (Will)   - c/w abx - pending ID recs - Pt meets sepsis criteria (fever, leukocytosis, source) 2/2 acute on chronic cholecystitis vs failed abx tx of liver abscess  - admit to tele  - CT Chest/Abd/Pel - poss acute kevin on chronic kevin, decr conspicousity of liver abscess - sm abscess remains  - lactate 4.0, repeat lactate 2.8 s/p 1.5L NS  - tylenol prn for fever  - trend WBCs  - consult ID (Will)   - c/w abx - pending ID recs  spoke with dr perales as per him she has received appropriate dose of zosyn  and cipro in er no antibiotics tonight will evaluate patient in am

## 2019-01-02 NOTE — H&P ADULT - HISTORY OF PRESENT ILLNESS
Pt is a 67 yo F with PMH of ESRD on HD (M, W, F), CAD s/p stents 2007, DM, HTN, MI, Liver abscess who presents to the ED with abdominal pain x2 days, 4 episodes of vomiting. Pt states that the abdominal pain started yesterday and feels a little better today. Pt denies exacerbating or relieving factors, pain does not change with food or movement. Pain described as non-radiating and constant and is located in the epigastrium. Pt has taken nothing for the pain. Pt had 4 episodes of non-bloody emesis and states it is yellow in color. Pt reports subjective fever and chills. Pt admits to weakness. Pt states 1 week ago she had blood in the stool on the toilet paper, but no blood in the toilet bowl. Pt denies hx of hemorrhoids. Pt has not had colonoscopy. Pt denies CP, palpitations, SOB, cough, myalgias, rash.     In ED Pt's vitals were: T(C) Max: 38.7, HR: 71, BP: 127/72, RR: 16, SpO2: 97% on RA  EKG - NSR, HR 69 bpm, RBBB  CXR - no active lung disease  CT Chest/Abd/Pel - 1. Chronic cholecystitis, as seen on prior exams. A superimposed acute component cannot be completely excluded. 2. Re-demonstration of a common bile duct stent with pneumobilia, compatible with stent patency. 3. Interval decreased conspicuity of previous noted abscesses throughout the liver with a small abscess remaining, as discussed.  Labs significant for WBC 17.5 with left shift, bandemia 13, initial lactate 4.0, repeat lactate 2.8 s/p 1.5L NS, BUN/Cr 52/6, Alk phos 353, AST 54. Albumin 1.3, Corrected Ca 9.6.    In ED, Pt given cipro, zosyn, zofran, morphine, 1.5L NS, Tylenol. Pt is a 69 yo F with PMH of ESRD on HD (M, W, F), CAD s/p stents 2007, DM, HTN, MI, biliary stent, Liver abscess who presents to the ED with abdominal pain x2 days, 4 episodes of vomiting. Pt states that the abdominal pain started yesterday and feels a little better today. Pt denies exacerbating or relieving factors, pain does not change with food or movement. Pain described as non-radiating and constant and is located in the epigastrium. Pt has taken nothing for the pain. Pt had 4 episodes of non-bloody emesis and states it is yellow in color. Pt reports subjective fever and chills. Pt admits to weakness. Pt states 1 week ago she had blood in the stool on the toilet paper, but no blood in the toilet bowl. Pt denies hx of hemorrhoids. Pt has not had colonoscopy. Pt denies CP, palpitations, SOB, cough, myalgias, rash.     In ED Pt's vitals were: T(C) Max: 38.7, HR: 71, BP: 127/72, RR: 16, SpO2: 97% on RA  EKG - NSR, HR 69 bpm, RBBB  CXR - no active lung disease  CT Chest/Abd/Pel - 1. Chronic cholecystitis, as seen on prior exams. A superimposed acute component cannot be completely excluded. 2. Re-demonstration of a common bile duct stent with pneumobilia, compatible with stent patency. 3. Interval decreased conspicuity of previous noted abscesses throughout the liver with a small abscess remaining, as discussed.  Labs significant for WBC 17.5 with left shift, bandemia 13, initial lactate 4.0, repeat lactate 2.8 s/p 1.5L NS, BUN/Cr 52/6, Alk phos 353, AST 54. Albumin 1.3, Corrected Ca 9.6.    In ED, Pt given cipro, zosyn, zofran, morphine, 1.5L NS, Tylenol. Pt is a 69 yo F with PMH of ESRD on HD (M, W, F), CAD s/p stents 2007, DM, HTN, MI, biliary stent, Liver abscess who presents to the ED with abdominal pain x2 days, 4 episodes of vomiting. Pt states that the abdominal pain started yesterday and feels a little better today. Pt denies exacerbating or relieving factors, pain does not change with food or movement. Pain described as non-radiating and constant and is located in the epigastrium. Pt has taken nothing for the pain. Pt had 4 episodes of non-bloody emesis and states it is yellow in color. Pt reports subjective fever and chills. Pt admits to weakness. Pt denies CP, palpitations, SOB, cough, myalgias, rash.     In ED Pt's vitals were: T(C) Max: 38.7, HR: 71, BP: 127/72, RR: 16, SpO2: 97% on RA  EKG - NSR, HR 69 bpm, RBBB  CXR - no active lung disease  CT Chest/Abd/Pel - 1. Chronic cholecystitis, as seen on prior exams. A superimposed acute component cannot be completely excluded. 2. Re-demonstration of a common bile duct stent with pneumobilia, compatible with stent patency. 3. Interval decreased conspicuity of previous noted abscesses throughout the liver with a small abscess remaining, as discussed.  Labs significant for WBC 17.5 with left shift, bandemia 13, initial lactate 4.0, repeat lactate 2.8 s/p 1.5L NS, BUN/Cr 52/6, Alk phos 353, AST 54. Albumin 1.3, Corrected Ca 9.6.    In ED, Pt given cipro, zosyn, zofran, morphine, 1.5L NS, Tylenol.

## 2019-01-02 NOTE — H&P ADULT - PROBLEM SELECTOR PLAN 4
on HD (M, W, F)  - consult Nephro (Deion)  - c/w HD as scheduled   - f/u BMP on HD (M, W, F)  - Nephro (Deion) following  - c/w HD as scheduled   - f/u BMP

## 2019-01-02 NOTE — H&P ADULT - ATTENDING COMMENTS
pt is 68 year old female with esrd on hd , chronic cholecystitis ,liver abcesses  admitted for sepsis , abdominal pain , vomiting has +troponin   surgery consulted   pending hida scan   gi consult   id consult   on iv antibiotics   pain control   npo   nephro consult for HD   dvt ppx   prognosis guarded pt is 68 year old female with esrd on hd , chronic cholecystitis ,liver abcesses  admitted for sepsis , abdominal pain , vomiting has +troponin   surgery consulted   pending hida scan   gi consult   id consult   cardiology consult   on iv antibiotics   pain control   npo   nephro consult for HD   dvt ppx   prognosis guarded

## 2019-01-02 NOTE — ED PROVIDER NOTE - MEDICAL DECISION MAKING DETAILS
abdominal pain, n/v x 2 days, lab, ekg, xr, ct, ivf, med,  ID and GI consults abdominal pain, n/v x 2 days, lab, ekg, xr, ct, ivf, med,  ID . renal and GI consults

## 2019-01-02 NOTE — H&P ADULT - PROBLEM SELECTOR PLAN 3
Pt has PICC in place and on tigecycline  - consult ID (Will) Pt has PICC in place and on tigecycline  - consult ID (Will) for abx recs

## 2019-01-02 NOTE — CONSULT NOTE ADULT - SUBJECTIVE AND OBJECTIVE BOX
Patient is a 68y old  Female who presents with a chief complaint of     HPI: Referred from [facility] for abd epigastric pain and vomited today.  As per pt, vomited x 4 since yesterday, complained lower chest and diffuse abd pain p taking Tylenol yesterday.     ho chills, but no fever. 2-3 pasty bm for past 2 days.    Renal consult called for ESRD on HD. Pt seen in ER. Awaiting CT scan.       PAST MEDICAL HISTORY:  Cholecystitis with cholangitis  Acute urinary retention  Liver abscess  ESRD (end stage renal disease) on dialysis  CAD (coronary artery disease)  Diabetes  CRF (chronic renal failure)  Hypertension  Pneumonia  Myocardial infarction  Hypertension  Diabetes      PAST SURGICAL HISTORY:  H/O: hysterectomy      FAMILY HISTORY:  No pertinent family history in first degree relatives      SOCIAL HISTORY: No smoking or alcohol use     Allergies    ertapenem (Urticaria)  Purell (Rash)    Intolerances      Home Medications:  epoetin analilia: 30048 unit(s) intravenous 3 times a day T/T/S  intra-dialysis  (02 Jan 2019 13:00)  famotidine 20 mg oral tablet: 1 tab(s) orally once (06 Dec 2018 12:37)  hydrocortisone 1% topical cream: 1 application topically 2 times a day, As needed, Rash and/or Itching (06 Dec 2018 12:37)    MEDICATIONS  (STANDING):  piperacillin/tazobactam IVPB. 4.5 Gram(s) IV Intermittent once  sodium chloride 0.9%. 1000 milliLiter(s) (125 mL/Hr) IV Continuous <Continuous>    REVIEW OF SYSTEMS:  General: NAD  Respiratory: No cough, SOB  Cardiovascular: No CP or Palpitations	  Gastrointestinal: Abd pain, + vomiting. No diarrhea  Genitourinary: No urinary complaints	  Musculoskeletal: No new rash or lesions	  all other systems negative    T(F): 101.7 (01-02-19 @ 13:10), Max: 101.7 (01-02-19 @ 13:10)  HR: 83 (01-02-19 @ 12:56) (83 - 83)  BP: 127/72 (01-02-19 @ 12:56) (127/72 - 127/72)  RR: 19 (01-02-19 @ 12:56) (19 - 19)  SpO2: 96% (01-02-19 @ 12:56) (96% - 96%)  Wt(kg): --    PHYSICAL EXAM:  General: Rt dialysis catheter  Respiratory: b/l air entry  Cardiovascular: S1 S2  Gastrointestinal: soft, mild tenderness  Extremities: no edema        01-02    137  |  98  |  52<H>  ----------------------------<  171<H>  4.2   |  27  |  6.00<H>    Ca    7.8<L>      02 Jan 2019 13:33    TPro  6.4  /  Alb  1.3<L>  /  TBili  0.6  /  DBili  x   /  AST  54<H>  /  ALT  33  /  AlkPhos  353<H>  01-02                          11.7   17.23 )-----------( 222      ( 02 Jan 2019 13:33 )             36.9       Potassium, Serum: 4.2 mmol/L (01-02 @ 13:33)  Blood Urea Nitrogen, Serum: 52 mg/dL (01-02 @ 13:33)  Calcium, Total Serum: 7.8 mg/dL (01-02 @ 13:33)  Hemoglobin: 11.7 g/dL (01-02 @ 13:33)      Creatinine, Serum: 6.00 (01-02 @ 13:33)        LIVER FUNCTIONS - ( 02 Jan 2019 13:33 )  Alb: 1.3 g/dL / Pro: 6.4 g/dL / ALK PHOS: 353 U/L / ALT: 33 U/L / AST: 54 U/L / GGT: x           CARDIAC MARKERS ( 02 Jan 2019 13:33 )  .050 ng/mL / x     / 149 U/L / x     / 1.8 ng/mL      Creatine Kinase, Serum: 149 U/L (01-02-19 @ 13:33)          I&O's Detail    02 Jan 2019 07:01  -  02 Jan 2019 14:24  --------------------------------------------------------  IN:    Sodium Chloride 0.9% IV Bolus: 1000 mL  Total IN: 1000 mL    OUT:  Total OUT: 0 mL    Total NET: 1000 mL

## 2019-01-02 NOTE — H&P ADULT - PROBLEM SELECTOR PLAN 9
IMPROVE VTE Individual Risk Assessment          RISK                                                          Points    [  ] Previous VTE                                                3  [  ] Thrombophilia                                             2  [  ] Lower limb paralysis                                   2        (unable to hold up >15 seconds)    [  ] Current Cancer                                            2         (within 6 months)  [  ] Immobilization > 24 hrs                              1  [  ] ICU/CCU stay > 24 hours                            1  [ x ] Age > 60                                                    1    IMPROVE VTE Score ___1______  Padua Score: 3    Heparin 5,000 q8 for VTE ppx

## 2019-01-02 NOTE — ED ADULT NURSE NOTE - NSIMPLEMENTINTERV_GEN_ALL_ED
Implemented All Fall Risk Interventions:  [place] to call system. Call bell, personal items and telephone within reach. Instruct patient to call for assistance. Room bathroom lighting operational. Non-slip footwear when patient is off stretcher. Physically safe environment: no spills, clutter or unnecessary equipment. Stretcher in lowest position, wheels locked, appropriate side rails in place. Provide visual cue, wrist band, yellow gown, etc. Monitor gait and stability. Monitor for mental status changes and reorient to person, place, and time. Review medications for side effects contributing to fall risk. Reinforce activity limits and safety measures with patient and family. Implemented All Fall with Harm Risk Interventions:  [place] to call system. Call bell, personal items and telephone within reach. Instruct patient to call for assistance. Room bathroom lighting operational. Non-slip footwear when patient is off stretcher. Physically safe environment: no spills, clutter or unnecessary equipment. Stretcher in lowest position, wheels locked, appropriate side rails in place. Provide visual cue, wrist band, yellow gown, etc. Monitor gait and stability. Monitor for mental status changes and reorient to person, place, and time. Review medications for side effects contributing to fall risk. Reinforce activity limits and safety measures with patient and family. Provide visual clues: red socks.

## 2019-01-02 NOTE — ED ADULT NURSE NOTE - PMH
CAD (coronary artery disease)  s/p stent in 2007  Diabetes    ESRD (end stage renal disease) on dialysis  MWF  Hypertension    Myocardial infarction Acute urinary retention    CAD (coronary artery disease)  s/p stent in 2007  Cholecystitis with cholangitis    Diabetes    ESRD (end stage renal disease) on dialysis  MWF  Hypertension    Liver abscess    Myocardial infarction

## 2019-01-03 DIAGNOSIS — R78.81 BACTEREMIA: ICD-10-CM

## 2019-01-03 DIAGNOSIS — D72.829 ELEVATED WHITE BLOOD CELL COUNT, UNSPECIFIED: ICD-10-CM

## 2019-01-03 DIAGNOSIS — K81.9 CHOLECYSTITIS, UNSPECIFIED: ICD-10-CM

## 2019-01-03 LAB
-  K. PNEUMONIAE GROUP: SIGNIFICANT CHANGE UP
ALBUMIN SERPL ELPH-MCNC: 1.2 G/DL — LOW (ref 3.3–5)
ALP SERPL-CCNC: 267 U/L — HIGH (ref 40–120)
ALT FLD-CCNC: 39 U/L — SIGNIFICANT CHANGE UP (ref 12–78)
ANION GAP SERPL CALC-SCNC: 9 MMOL/L — SIGNIFICANT CHANGE UP (ref 5–17)
AST SERPL-CCNC: 81 U/L — HIGH (ref 15–37)
BASOPHILS # BLD AUTO: 0.03 K/UL — SIGNIFICANT CHANGE UP (ref 0–0.2)
BASOPHILS NFR BLD AUTO: 0.2 % — SIGNIFICANT CHANGE UP (ref 0–2)
BILIRUB SERPL-MCNC: 0.8 MG/DL — SIGNIFICANT CHANGE UP (ref 0.2–1.2)
BUN SERPL-MCNC: 52 MG/DL — HIGH (ref 7–23)
CALCIUM SERPL-MCNC: 7.7 MG/DL — LOW (ref 8.5–10.1)
CHLORIDE SERPL-SCNC: 99 MMOL/L — SIGNIFICANT CHANGE UP (ref 96–108)
CK MB BLD-MCNC: 1.5 % — SIGNIFICANT CHANGE UP (ref 0–3.5)
CK MB BLD-MCNC: 1.9 % — SIGNIFICANT CHANGE UP (ref 0–3.5)
CK MB CFR SERPL CALC: 1.5 NG/ML — SIGNIFICANT CHANGE UP (ref 0–3.6)
CK MB CFR SERPL CALC: 1.6 NG/ML — SIGNIFICANT CHANGE UP (ref 0–3.6)
CK SERPL-CCNC: 83 U/L — SIGNIFICANT CHANGE UP (ref 26–192)
CK SERPL-CCNC: 98 U/L — SIGNIFICANT CHANGE UP (ref 26–192)
CO2 SERPL-SCNC: 27 MMOL/L — SIGNIFICANT CHANGE UP (ref 22–31)
CREAT SERPL-MCNC: 6.2 MG/DL — HIGH (ref 0.5–1.3)
EOSINOPHIL # BLD AUTO: 0.03 K/UL — SIGNIFICANT CHANGE UP (ref 0–0.5)
EOSINOPHIL NFR BLD AUTO: 0.2 % — SIGNIFICANT CHANGE UP (ref 0–6)
GLUCOSE SERPL-MCNC: 140 MG/DL — HIGH (ref 70–99)
GRAM STN FLD: SIGNIFICANT CHANGE UP
HBA1C BLD-MCNC: 6 % — HIGH (ref 4–5.6)
HCT VFR BLD CALC: 36.3 % — SIGNIFICANT CHANGE UP (ref 34.5–45)
HGB BLD-MCNC: 11.5 G/DL — SIGNIFICANT CHANGE UP (ref 11.5–15.5)
IMM GRANULOCYTES NFR BLD AUTO: 0.3 % — SIGNIFICANT CHANGE UP (ref 0–1.5)
LYMPHOCYTES # BLD AUTO: 21.5 % — SIGNIFICANT CHANGE UP (ref 13–44)
LYMPHOCYTES # BLD AUTO: 3.08 K/UL — SIGNIFICANT CHANGE UP (ref 1–3.3)
MCHC RBC-ENTMCNC: 29 PG — SIGNIFICANT CHANGE UP (ref 27–34)
MCHC RBC-ENTMCNC: 31.7 GM/DL — LOW (ref 32–36)
MCV RBC AUTO: 91.4 FL — SIGNIFICANT CHANGE UP (ref 80–100)
METHOD TYPE: SIGNIFICANT CHANGE UP
MONOCYTES # BLD AUTO: 1.06 K/UL — HIGH (ref 0–0.9)
MONOCYTES NFR BLD AUTO: 7.4 % — SIGNIFICANT CHANGE UP (ref 2–14)
NEUTROPHILS # BLD AUTO: 10.1 K/UL — HIGH (ref 1.8–7.4)
NEUTROPHILS NFR BLD AUTO: 70.4 % — SIGNIFICANT CHANGE UP (ref 43–77)
PLATELET # BLD AUTO: 189 K/UL — SIGNIFICANT CHANGE UP (ref 150–400)
POTASSIUM SERPL-MCNC: 4.4 MMOL/L — SIGNIFICANT CHANGE UP (ref 3.5–5.3)
POTASSIUM SERPL-SCNC: 4.4 MMOL/L — SIGNIFICANT CHANGE UP (ref 3.5–5.3)
PROT SERPL-MCNC: 6 G/DL — SIGNIFICANT CHANGE UP (ref 6–8.3)
RBC # BLD: 3.97 M/UL — SIGNIFICANT CHANGE UP (ref 3.8–5.2)
RBC # FLD: 23.7 % — HIGH (ref 10.3–14.5)
SODIUM SERPL-SCNC: 135 MMOL/L — SIGNIFICANT CHANGE UP (ref 135–145)
SPECIMEN SOURCE: SIGNIFICANT CHANGE UP
TROPONIN I SERPL-MCNC: 0.04 NG/ML — SIGNIFICANT CHANGE UP (ref 0.01–0.04)
TROPONIN I SERPL-MCNC: 0.05 NG/ML — HIGH (ref 0.01–0.04)
WBC # BLD: 14.34 K/UL — HIGH (ref 3.8–10.5)
WBC # FLD AUTO: 14.34 K/UL — HIGH (ref 3.8–10.5)

## 2019-01-03 PROCEDURE — 99222 1ST HOSP IP/OBS MODERATE 55: CPT

## 2019-01-03 PROCEDURE — 99233 SBSQ HOSP IP/OBS HIGH 50: CPT | Mod: GC

## 2019-01-03 PROCEDURE — 78226 HEPATOBILIARY SYSTEM IMAGING: CPT | Mod: 26

## 2019-01-03 RX ORDER — DEXTROSE 50 % IN WATER 50 %
12.5 SYRINGE (ML) INTRAVENOUS ONCE
Qty: 0 | Refills: 0 | Status: COMPLETED | OUTPATIENT
Start: 2019-01-03 | End: 2019-01-03

## 2019-01-03 RX ORDER — SODIUM CHLORIDE 9 MG/ML
1000 INJECTION, SOLUTION INTRAVENOUS
Qty: 0 | Refills: 0 | Status: DISCONTINUED | OUTPATIENT
Start: 2019-01-03 | End: 2019-01-06

## 2019-01-03 RX ORDER — CIPROFLOXACIN LACTATE 400MG/40ML
400 VIAL (ML) INTRAVENOUS EVERY 24 HOURS
Qty: 0 | Refills: 0 | Status: DISCONTINUED | OUTPATIENT
Start: 2019-01-03 | End: 2019-01-03

## 2019-01-03 RX ADMIN — ATORVASTATIN CALCIUM 80 MILLIGRAM(S): 80 TABLET, FILM COATED ORAL at 21:40

## 2019-01-03 RX ADMIN — Medication 100 MILLIGRAM(S): at 14:25

## 2019-01-03 RX ADMIN — PIPERACILLIN AND TAZOBACTAM 25 GRAM(S): 4; .5 INJECTION, POWDER, LYOPHILIZED, FOR SOLUTION INTRAVENOUS at 05:50

## 2019-01-03 RX ADMIN — GABAPENTIN 300 MILLIGRAM(S): 400 CAPSULE ORAL at 18:43

## 2019-01-03 RX ADMIN — Medication 81 MILLIGRAM(S): at 14:26

## 2019-01-03 RX ADMIN — Medication 60 MILLIGRAM(S): at 05:50

## 2019-01-03 RX ADMIN — GABAPENTIN 300 MILLIGRAM(S): 400 CAPSULE ORAL at 05:50

## 2019-01-03 RX ADMIN — Medication 50 MILLIGRAM(S): at 05:50

## 2019-01-03 RX ADMIN — PIPERACILLIN AND TAZOBACTAM 25 GRAM(S): 4; .5 INJECTION, POWDER, LYOPHILIZED, FOR SOLUTION INTRAVENOUS at 18:43

## 2019-01-03 RX ADMIN — Medication 12.5 MILLILITER(S): at 12:02

## 2019-01-03 RX ADMIN — HEPARIN SODIUM 5000 UNIT(S): 5000 INJECTION INTRAVENOUS; SUBCUTANEOUS at 21:40

## 2019-01-03 RX ADMIN — SODIUM CHLORIDE 60 MILLILITER(S): 9 INJECTION, SOLUTION INTRAVENOUS at 14:25

## 2019-01-03 RX ADMIN — ISOSORBIDE MONONITRATE 30 MILLIGRAM(S): 60 TABLET, EXTENDED RELEASE ORAL at 21:42

## 2019-01-03 RX ADMIN — ISOSORBIDE MONONITRATE 60 MILLIGRAM(S): 60 TABLET, EXTENDED RELEASE ORAL at 14:26

## 2019-01-03 RX ADMIN — SENNA PLUS 2 TABLET(S): 8.6 TABLET ORAL at 21:40

## 2019-01-03 RX ADMIN — HYDROMORPHONE HYDROCHLORIDE 0.5 MILLIGRAM(S): 2 INJECTION INTRAMUSCULAR; INTRAVENOUS; SUBCUTANEOUS at 16:28

## 2019-01-03 RX ADMIN — HEPARIN SODIUM 5000 UNIT(S): 5000 INJECTION INTRAVENOUS; SUBCUTANEOUS at 05:50

## 2019-01-03 RX ADMIN — HEPARIN SODIUM 5000 UNIT(S): 5000 INJECTION INTRAVENOUS; SUBCUTANEOUS at 14:26

## 2019-01-03 NOTE — PROGRESS NOTE ADULT - PROBLEM SELECTOR PLAN 5
s/p stent 2007  - Continue home dose atorvastatin, metoprolol (with hold parameters) and baby aspirin.  - Per surgery, holding Plavix now for possible lap kevin.

## 2019-01-03 NOTE — PROGRESS NOTE ADULT - ASSESSMENT
·	ESRD on HD  ·	Fever r/o Sepsis  ·	s/p recent ERCP and CBD stone extraction @ [facility], h/o Liver abscess  ·	Diabetes  ·	Hypertension    Fever work up in progress. Surgery follow up. HD today. To continue HD TIW as scheduled. Fluid removal as tolerated by BP. Dietary and PO fluid restriction.   Monitor BP trend. Titrate BP meds as needed. Salt restriction. ID evaluation. IV abx.  Monitor blood sugar levels. Insulin coverage as needed.   D/w family at bedside.

## 2019-01-03 NOTE — CONSULT NOTE ADULT - SUBJECTIVE AND OBJECTIVE BOX
69 yo female with multiple medical problems presents with nausea and abdominal pain x 4 days. Pt developed sharp RUQ abdominal pain and continued with no relieving or aggravating factors. Pain similar to previous episode.  No fever/chills. LAst BM yesterday. No other complaints   REVIEW OF SYSTEMS:    CONSTITUTIONAL: No weakness, fatigue, malaise, fevers or chills, no weight change, appetite change  EYES: No visual changes; No double vision,  No vertigo, eye pain  Ears: no otalgia, no otorhea, no hearing loss, tinnitus  Nose: no epistaxis, rhinorrhea, sinus pressure  Throat: no throat pain, no oral lesions  NECK: No pain or stiffness  RESPIRATORY: No cough (productive or dry), wheezing, hemoptysis; No shortness of breath  CARDIOVASCULAR: No chest pain or palpitations,    GASTROINTESTINAL: as above  NEUROLOGICAL: No numbness or weakness, headache, memory loss,   SKIN: No pruritis, rashes, lesions or new moles  Psych: No anxiety, sadness, insomnia,    Endocrine: No Heat or Cold intolerance, polydipsia, polyphagia  Heme/Lymph: no LN enlargement, no easy bruising or bleeding     PAST MEDICAL & SURGICAL HISTORY:  Cholecystitis with cholangitis  Acute urinary retention  Liver abscess  ESRD (end stage renal disease) on dialysis: MWF  CAD (coronary artery disease): s/p stent in 2007  Diabetes  Myocardial infarction  Hypertension  H/O: hysterectomy    Allergies    ertapenem (Urticaria)  Purell (Rash)    Intolerances    SH-neg  FH- NC      .  VITAL SIGNS:  T(C): 36.4 (01-03-19 @ 09:25), Max: 38.7 (01-02-19 @ 13:10)  T(F): 97.5 (01-03-19 @ 09:25), Max: 101.7 (01-02-19 @ 13:10)  HR: 62 (01-03-19 @ 09:25) (61 - 83)  BP: 105/47 (01-03-19 @ 09:25) (105/47 - 127/72)  BP(mean): --  RR: 18 (01-03-19 @ 09:25) (16 - 19)  SpO2: 98% (01-03-19 @ 09:25) (96% - 100%)  Wt(kg): --    PHYSICAL EXAM:    Constitutional:  NAD, resting comfortably in bed  Head: NC/AT  Eyes: PERRL b/l  ENT: MMM  Neck: supple; no JVD or thyromegaly  Respiratory: CTA B/L   Cardiac: +S1/S2; RRR   Gastrointestinal: RUQ tenderness  Back: no CVA B/L  Extremities: WWP, no clubbing or cyanosis; no peripheral edema  Musculoskeletal: NROM x4;   Vascular: 2+ radial, femoral, DP/PT pulses B/L  Dermatologic: skin warm, dry and intact; no rashes, wounds, or scars  Lymphatic: no submandibular, cervical or  LAD  Neurologic: AAOx3; CNII-XII grossly intact; no focal deficits  Psychiatric: affect and characteristics of appearance, verbalizations, behaviors are appropriate                          11.5   14.34 )-----------( 189      ( 03 Jan 2019 05:43 )             36.3   01-03    135  |  99  |  52<H>  ----------------------------<  140<H>  4.4   |  27  |  6.20<H>    Ca    7.7<L>      03 Jan 2019 05:43    TPro  6.0  /  Alb  1.2<L>  /  TBili  0.8  /  DBili  x   /  AST  81<H>  /  ALT  39  /  AlkPhos  267<H>  01-03    < from: CT Abdomen and Pelvis w/ IV Cont (01.02.19 @ 15:04) >  CT abdomen and pelvis:  1. Chronic cholecystitis, as seen on prior exams. A superimposed acute   component cannot be completely excluded.  2. Redemonstration of a common bile duct stent with pneumobilia,   compatible with stent patency.  3. Interval decreased conspicuity of previous noted abscesses throughout   the liver with a small abscess remaining, as discussed.  4. Other chronic nonemergent findings, as discussed.            < end of copied text >

## 2019-01-03 NOTE — PROGRESS NOTE ADULT - SUBJECTIVE AND OBJECTIVE BOX
Patient is a 68y old  Female who presents with a chief complaint of abd pain, vomiting (03 Jan 2019 11:19)    HPI:  Pt is a 69 yo F with PMH of ESRD on HD (M, W, F), CAD s/p stents 2007, DM, HTN, MI, biliary stent, Liver abscess who presents to the ED with abdominal pain x2 days, 4 episodes of vomiting. Pt states that the abdominal pain started yesterday and feels a little better today. Pt denies exacerbating or relieving factors, pain does not change with food or movement. Pain described as non-radiating and constant and is located in the epigastrium. Pt has taken nothing for the pain. Pt had 4 episodes of non-bloody emesis and states it is yellow in color. Pt reports subjective fever and chills. Pt admits to weakness. Pt denies CP, palpitations, SOB, cough, myalgias, rash.     In ED Pt's vitals were: T(C) Max: 38.7, HR: 71, BP: 127/72, RR: 16, SpO2: 97% on RA  EKG - NSR, HR 69 bpm, RBBB  CXR - no active lung disease  CT Chest/Abd/Pel - 1. Chronic cholecystitis, as seen on prior exams. A superimposed acute component cannot be completely excluded. 2. Re-demonstration of a common bile duct stent with pneumobilia, compatible with stent patency. 3. Interval decreased conspicuity of previous noted abscesses throughout the liver with a small abscess remaining, as discussed.  Labs significant for WBC 17.5 with left shift, bandemia 13, initial lactate 4.0, repeat lactate 2.8 s/p 1.5L NS, BUN/Cr 52/6, Alk phos 353, AST 54. Albumin 1.3, Corrected Ca 9.6.  In ED, Pt given cipro, zosyn, zofran, morphine, 1.5L NS, Tylenol. (02 Jan 2019 16:48)      SUBJECTIVE & OBJECTIVE: Patient seen and examined with daughter at bedside. She reports that her abdominal pain has improved and now only tender when palpated at RUQ. She denies any CP, SOB or other complaint.    PHYSICAL EXAM:  T(C): 36.4 (01-03-19 @ 09:25), Max: 38.7 (01-02-19 @ 13:10)  HR: 62 (01-03-19 @ 09:25) (61 - 83)  BP: 105/47 (01-03-19 @ 09:25) (105/47 - 127/72)  RR: 18 (01-03-19 @ 09:25) (16 - 19)  SpO2: 98% (01-03-19 @ 09:25) (96% - 100%)  Wt(kg): -- Height (cm): 165.1 (01-02 @ 12:56)  Weight (kg): 84.8 (01-02 @ 12:56)  BMI (kg/m2): 31.1 (01-02 @ 12:56)  BSA (m2): 1.92 (01-02 @ 12:56)    GENERAL: NAD, well-groomed, well-developed  HEAD: Atraumatic, Normocephalic  EYES: EOMI, PERRL, conjunctiva and sclera clear  ENMT: Moist mucous membranes  NECK: Supple, No JVD  NERVOUS SYSTEM: Alert & Oriented X3, Motor Strength 5/5 B/L upper and lower extremities; DTRs 2+ intact and symmetric  CHEST/LUNG: Clear to auscultation bilaterally; No rales, rhonchi, wheezing, or rubs  HEART: Regular rate and rhythm; No murmurs, rubs, or gallops  ABDOMEN: Soft, +TTP at RUQ, Nondistended; Bowel sounds present  EXTREMITIES: 2+ Peripheral Pulses, No clubbing, cyanosis. Trace bilateral LE edema      MEDICATIONS  (STANDING):  aspirin enteric coated 81 milliGRAM(s) Oral daily  atorvastatin 80 milliGRAM(s) Oral at bedtime  ciprofloxacin   IVPB 400 milliGRAM(s) IV Intermittent every 24 hours  clopidogrel Tablet 75 milliGRAM(s) Oral daily  dextrose 5%. 1000 milliLiter(s) (50 mL/Hr) IV Continuous <Continuous>  dextrose 50% Injectable 12.5 Gram(s) IV Push once  dextrose 50% Injectable 25 Gram(s) IV Push once  dextrose 50% Injectable 25 Gram(s) IV Push once  docusate sodium 100 milliGRAM(s) Oral daily  gabapentin 300 milliGRAM(s) Oral two times a day  heparin  Injectable 5000 Unit(s) SubCutaneous every 8 hours  insulin lispro (HumaLOG) corrective regimen sliding scale   SubCutaneous three times a day before meals  insulin lispro (HumaLOG) corrective regimen sliding scale   SubCutaneous at bedtime  isosorbide   mononitrate ER Tablet (IMDUR) 30 milliGRAM(s) Oral at bedtime  isosorbide   mononitrate ER Tablet (IMDUR) 60 milliGRAM(s) Oral daily  metoprolol tartrate 50 milliGRAM(s) Oral two times a day  NIFEdipine XL 60 milliGRAM(s) Oral daily  piperacillin/tazobactam IVPB. 3.375 Gram(s) IV Intermittent every 12 hours  senna 2 Tablet(s) Oral at bedtime  sodium chloride 0.9%. 1000 milliLiter(s) (50 mL/Hr) IV Continuous <Continuous>    MEDICATIONS  (PRN):  dextrose 40% Gel 15 Gram(s) Oral once PRN Blood Glucose LESS THAN 70 milliGRAM(s)/deciliter  glucagon  Injectable 1 milliGRAM(s) IntraMuscular once PRN Glucose LESS THAN 70 milligrams/deciliter  HYDROmorphone  Injectable 0.5 milliGRAM(s) IV Push every 6 hours PRN Moderate Pain (4 - 6)  HYDROmorphone  Injectable 1 milliGRAM(s) IV Push every 8 hours PRN Severe Pain (7 - 10)      LABS:                        11.5   14.34 )-----------( 189      ( 03 Jan 2019 05:43 )             36.3     01-03    135  |  99  |  52<H>  ----------------------------<  140<H>  4.4   |  27  |  6.20<H>    Ca    7.7<L>      03 Jan 2019 05:43    TPro  6.0  /  Alb  1.2<L>  /  TBili  0.8  /  DBili  x   /  AST  81<H>  /  ALT  39  /  AlkPhos  267<H>  01-03          CAPILLARY BLOOD GLUCOSE      POCT Blood Glucose.: 72 mg/dL (03 Jan 2019 11:54)  POCT Blood Glucose.: 84 mg/dL (03 Jan 2019 10:55)  POCT Blood Glucose.: 114 mg/dL (03 Jan 2019 07:48)  POCT Blood Glucose.: 156 mg/dL (02 Jan 2019 23:49)      CAPILLARY BLOOD GLUCOSE      POCT Blood Glucose.: 72 mg/dL (03 Jan 2019 11:54)  POCT Blood Glucose.: 84 mg/dL (03 Jan 2019 10:55)  POCT Blood Glucose.: 114 mg/dL (03 Jan 2019 07:48)  POCT Blood Glucose.: 156 mg/dL (02 Jan 2019 23:49)    CAPILLARY BLOOD GLUCOSE      POCT Blood Glucose.: 72 mg/dL (03 Jan 2019 11:54)      CARDIAC MARKERS ( 03 Jan 2019 05:43 )  .041 ng/mL / x     / 83 U/L / x     / 1.6 ng/mL  CARDIAC MARKERS ( 03 Jan 2019 03:16 )  .048 ng/mL / x     / 98 U/L / x     / 1.5 ng/mL  CARDIAC MARKERS ( 02 Jan 2019 20:58 )  .054 ng/mL / x     / 148 U/L / x     / 1.7 ng/mL  CARDIAC MARKERS ( 02 Jan 2019 13:33 )  .050 ng/mL / x     / 149 U/L / x     / 1.8 ng/mL        RECENT CULTURES:      RADIOLOGY & ADDITIONAL TESTS:                    DVT/GI ppx  Discussed with patient at bedside.

## 2019-01-03 NOTE — SWALLOW BEDSIDE ASSESSMENT ADULT - COMMENTS
69 yo F with PMH of ESRD , CAD s/p stents 2007, DM, HTN, MI, biliary stent, Liver abscess who presents to the ED with abdominal pain x 2 days, 4 episodes of vomiting. Pt states that the abdominal pain  Pt c slow mastication of solids c reduced mastication and transport c timely trigger of swallow c clear breath sounds pre/post deglutition   pt reports increased abdominal pain p eating

## 2019-01-03 NOTE — CONSULT NOTE ADULT - ASSESSMENT
69 yo with multiple medical issues s/p cholecystostomy tube in past now with poss cholecystitis     for HIDA      if positive rec cholecystostomy tube 67 yo with multiple medical issues s/p cholecystostomy tube in past now with poss cholecystitis     for HIDA      if positive will consider lap kevin if cleared appropriately, hold plavix

## 2019-01-03 NOTE — CONSULT NOTE ADULT - PROBLEM SELECTOR RECOMMENDATION 4
will follow.    Thank you for consulting us and involving us in the management of this most interesting and challenging case.     We will follow along in the care of this patient.

## 2019-01-03 NOTE — PHARMACOTHERAPY INTERVENTION NOTE - COMMENTS
Patient was receiving ciprofloxacin 400mg IV q12h  for sepsis.  Spoke with the team about the patient's renal function (CrCl =9) and discussed decreasing the frequency from Q12h to Q24H.  The team agreed and the order was changed in sunrise.

## 2019-01-03 NOTE — PROGRESS NOTE ADULT - PROBLEM SELECTOR PLAN 1
Pt met sepsis criteria (fever, leukocytosis, source) on admission, likely 2/2 acute on chronic cholecystitis vs. failed abx tx of liver abscess.  - CT Chest/Abd/Pel showed possible acute kevin on chronic kevin, decreased conspicousity of liver abscess - small abscess remains.  - Lactate downtrended to wnl.  - Continue pain regimen and Tylenol PRN for fever.  - WBC still elevated but downtrending.  - Continue IV Zosyn and Cipro.  - f/u ID recs (Dr. Solis). Pt met sepsis criteria (fever, leukocytosis, source) on admission, likely 2/2 acute on chronic cholecystitis vs. failed abx tx of liver abscess.  - CT Chest/Abd/Pel showed possible acute kevin on chronic kevin, decreased conspicousity of liver abscess - small abscess remains.  - Lactate downtrended to wnl.  - Continue pain regimen and Tylenol PRN for fever.  - WBC still elevated but downtrending.  - BCx positive for GNR. f/u next set.  - Continue IV Zosyn and Cipro.  - f/u ID recs (Dr. Solis).

## 2019-01-03 NOTE — CONSULT NOTE ADULT - SUBJECTIVE AND OBJECTIVE BOX
HPI:  Pt is a 67 yo F with PMH of ESRD on HD (M, W, F), CAD s/p stents 2007, DM, HTN, MI, biliary stent, Liver abscess who presents to the ED with abdominal pain x2 days, 4 episodes of vomiting. Pt states that the abdominal pain started prior to admission. Pt denies exacerbating or relieving factors, pain does not change with food or movement. Pain described as non-radiating and constant and is located in entire abd but worse in RUQ. Pt has taken nothing for the pain. Pt had 4 episodes of non-bloody emesis and states it is yellow in color. Pt reports subjective fever and chills. Pt admits to weakness. Pt denies CP, palpitations, SOB, cough, myalgias, rash.     In ED Pt's vitals were: T(C) Max: 38.7, HR: 71, BP: 127/72, RR: 16, SpO2: 97% on RA  EKG - NSR, HR 69 bpm, RBBB  CXR - no active lung disease  CT Chest/Abd/Pel - 1. Chronic cholecystitis, as seen on prior exams. A superimposed acute component cannot be completely excluded. 2. Re-demonstration of a common bile duct stent with pneumobilia, compatible with stent patency. 3. Interval decreased conspicuity of previous noted abscesses throughout the liver with a small abscess remaining, as discussed.  Labs significant for WBC 17.5 with left shift, bandemia 13, initial lactate 4.0, repeat lactate 2.8 s/p 1.5L NS, BUN/Cr 52/6, Alk phos 353, AST 54. Albumin 1.3, Corrected Ca 9.6.    In ED, Pt given cipro, zosyn, zofran, morphine, 1.5L NS, Tylenol. (02 Jan 2019 16:48)      PAST MEDICAL & SURGICAL HISTORY:  Cholecystitis with cholangitis  Acute urinary retention  Liver abscess  ESRD (end stage renal disease) on dialysis: MWF  CAD (coronary artery disease): s/p stent in 2007  Diabetes  Myocardial infarction  Hypertension  H/O: hysterectomy      Antimicrobials  ciprofloxacin   IVPB 400 milliGRAM(s) IV Intermittent every 24 hours  piperacillin/tazobactam IVPB. 3.375 Gram(s) IV Intermittent every 12 hours      Immunological      Other  aspirin enteric coated 81 milliGRAM(s) Oral daily  atorvastatin 80 milliGRAM(s) Oral at bedtime  dextrose 40% Gel 15 Gram(s) Oral once PRN  dextrose 5%. 1000 milliLiter(s) IV Continuous <Continuous>  dextrose 50% Injectable 12.5 Gram(s) IV Push once  dextrose 50% Injectable 25 Gram(s) IV Push once  dextrose 50% Injectable 25 Gram(s) IV Push once  docusate sodium 100 milliGRAM(s) Oral daily  gabapentin 300 milliGRAM(s) Oral two times a day  glucagon  Injectable 1 milliGRAM(s) IntraMuscular once PRN  heparin  Injectable 5000 Unit(s) SubCutaneous every 8 hours  HYDROmorphone  Injectable 0.5 milliGRAM(s) IV Push every 6 hours PRN  HYDROmorphone  Injectable 1 milliGRAM(s) IV Push every 8 hours PRN  insulin lispro (HumaLOG) corrective regimen sliding scale   SubCutaneous three times a day before meals  insulin lispro (HumaLOG) corrective regimen sliding scale   SubCutaneous at bedtime  isosorbide   mononitrate ER Tablet (IMDUR) 30 milliGRAM(s) Oral at bedtime  isosorbide   mononitrate ER Tablet (IMDUR) 60 milliGRAM(s) Oral daily  metoprolol tartrate 50 milliGRAM(s) Oral two times a day  NIFEdipine XL 60 milliGRAM(s) Oral daily  senna 2 Tablet(s) Oral at bedtime  sodium chloride 0.9%. 1000 milliLiter(s) IV Continuous <Continuous>      Allergies    ertapenem (Urticaria)  Purell (Rash)    Intolerances    SOCIAL HISTORY: no toxic habits reported    FAMILY HISTORY:  No pertinent family history in first degree relatives      ROS:    EYES:  Negative  blurry vision or double vision  GASTROINTESTINAL:  Negative for nausea, vomiting, diarrhea  -otherwise negative except for subjective    Vital Signs Last 24 Hrs  T(C): 36.4 (03 Jan 2019 09:25), Max: 38.7 (02 Jan 2019 13:10)  T(F): 97.5 (03 Jan 2019 09:25), Max: 101.7 (02 Jan 2019 13:10)  HR: 62 (03 Jan 2019 09:25) (61 - 83)  BP: 105/47 (03 Jan 2019 09:25) (105/47 - 127/72)  BP(mean): --  RR: 18 (03 Jan 2019 09:25) (16 - 19)  SpO2: 98% (03 Jan 2019 09:25) (96% - 100%)    PE:  WDWN in no distress  HEENT:  NC, PERRL, sclerae anicteric, conjunctivae clear, EOMI.  Sinuses nontender, no nasal exudate.  No buccal or pharyngeal lesions, erythema or exudate  Neck:  Supple, no adenopathy  Lungs:  No accessory muscle use, bilaterally clear to auscultation  Cor:  RRR, S1, S2, no murmur appreciated  Abd:  Symmetric, normoactive BS.  Soft diffusely tender, no masses, guarding or rebound.   Extrem:  No cyanosis or edema  Skin:  No rashes.  Neuro: grossly intact  Musc: moving all limbs freely, no focal deficits    LABS:                        11.5   14.34 )-----------( 189      ( 03 Jan 2019 05:43 )             36.3       WBC Count: 14.34 K/uL (01-03-19 @ 05:43)  WBC Count: 17.23 K/uL (01-02-19 @ 13:33)      01-03    135  |  99  |  52<H>  ----------------------------<  140<H>  4.4   |  27  |  6.20<H>    Ca    7.7<L>      03 Jan 2019 05:43    TPro  6.0  /  Alb  1.2<L>  /  TBili  0.8  /  DBili  x   /  AST  81<H>  /  ALT  39  /  AlkPhos  267<H>  01-03      Creatinine, Serum: 6.20 mg/dL (01-03-19 @ 05:43)  Creatinine, Serum: 6.00 mg/dL (01-02-19 @ 13:33)        MICROBIOLOGY:    Culture - Blood (01.02.19 @ 18:32)    -  Klebsiella pneumoniae: Detec    Gram Stain:   Growth in aerobic bottle: Gram Negative Rods    Specimen Source: .Blood Blood-Peripheral    Organism: Blood Culture PCR    Culture Results:   Growth in aerobic bottle: Gram Negative Rods      RADIOLOGY & ADDITIONAL STUDIES:    --< from: CT Abdomen and Pelvis w/ IV Cont (01.02.19 @ 15:04) >    EXAM:  CT ABDOMEN AND PELVIS IC                          EXAM:  CT CHEST IC                            PROCEDURE DATE:  01/02/2019          INTERPRETATION:  .    CLINICAL INFORMATION: Lower chest pain.    TECHNIQUE: Helical axial images were obtained from the thoracic inlet to   the pubic symphysis. 95 mls of Omnipaque-350 was administered   intravenously without complication and 5 mls were discarded. Oral   contrast was not administered. Sagittal and coronal reconstructed images   were obtained from the source data.    COMPARISON: Most recent prior CT examination of the abdomen and pelvis   from 11/26/2018. Prior MRI examination of the abdomen from 11/27/2018.   Prior chest, abdomen, and pelvis CT examination from 10/29/2018.    FINDINGS: Scattered subcentimeter sized lymph nodes are noted within the   axillary regions, mediastinum, and hilar areas.    The heart size is enlarged. The pericardium appears unremarkable. There   is a right sided dialysis catheter with its tip at the right atrium. A   left-sided IJ central line is noted with the tip at the SVC. No filling   defects are notable within the pulmonary arterial vessels. There are   atherosclerotic calcifications of the imaged coronary arteries, aorta,   and branch vessels. The imaged portions of the aorta are normal in   caliber.    The central airways are patent. Patchy groundglass opacities are notable   throughout both lungs with resultant mosaic attenuation. Scattered areas   of linear atelectasis are notable within the bilateral lung bases, left   upper lobe, and lingula.    There is redemonstration of nonspecific gallbladder wall thickening   and/or edema and/or fat deposition. Sludge and/or stones are noted within   the lumen of the gallbladder. A cholecystostomy tube tract is notable   along the right upper anterior abdominal wall. A small amount of   pneumobilia is noted, compatible with stent patency. The common bile duct   remains dilated and measures up to 1.2 cm. A common bile duct stent is   again noted. No radiopaque stones are notable adjacent to the stent.   Previously noted abscesses within the liver appear less conspicuous   compared to the prior MRI and CT examinations. A small low-attenuation   focus is noted within the central hepatic area measuring up to 8 mm   (series 2, image 114).     The pancreas, spleen, and adrenal glands appear unremarkable.    There is atrophy of the bilateral kidneys. A left-sided renal cyst   appears unchanged. There is minimal nonspecific bilateral perinephric   thickening. There is no hydronephrosis bilaterally. Arterial vascular   calcifications are noted in the vicinity of the bilateral renal   collecting systems. The urinary bladder appears unremarkable.    There are multiple scattered nonspecificsubcentimeter retroperitoneal   and mesenteric lymph nodes.    There is no bowel obstruction or abdominal ascites. A duodenal   diverticulum appears unchanged. Gas and stool are notable throughout the   large bowel loops. The appendix is normal.    The patient is status post hysterectomy. No adnexal masses are noted.    Multilevel degenerative changes notable within the imaged spine. There is   a moderate compression deformity of the L2 vertebral body which appears   unchanged. There is also a mild superior endplate deformity of the T12   vertebral body which also appears unchanged.    Areas of mixed lucency and sclerosis adjacent to the bilateral SI joints   appear unchanged. Mild diffuse osteosclerosis may be compatible with   early renal osteodystrophy.    IMPRESSION:    CT chest:  1. Groundglass mosaic attenuation to the lungs which may reflect small   airways or small vessel disease.    CT abdomen and pelvis:  1. Chronic cholecystitis, as seen on prior exams. A superimposed acute   component cannot be completely excluded.  2. Redemonstration of a common bile duct stent with pneumobilia,   compatible with stent patency.  3. Interval decreased conspicuity of previous noted abscesses throughout   the liver with a small abscess remaining, as discussed.  4. Other chronic nonemergent findings, as discussed.

## 2019-01-03 NOTE — CONSULT NOTE ADULT - PROBLEM SELECTOR RECOMMENDATION 9
Recommend IV Zosyn continued pending sensitivities, highest suspicion for hepatic/biliary source, will order repeat blood cultures, GI involvement for assessment of any required site control

## 2019-01-03 NOTE — PROGRESS NOTE ADULT - PROBLEM SELECTOR PLAN 2
Possibly acute on chronic per CT; s/p biliary stent  - Continue IV Zosyn and Cipro.  - NPO for now; f/u HIDA scan.  - Per Gen Surg (Dr. Campos), if HIDA positive will consider lap kevin if cleared appropriately, hold Plavix.  - Per GI (Dr. Camacho), CBD stent still in place will need to be removed after lap kevin.

## 2019-01-03 NOTE — PROGRESS NOTE ADULT - SUBJECTIVE AND OBJECTIVE BOX
Patient is a 68y old  Female who presents with a chief complaint of abd pain, vomiting (03 Jan 2019 12:08)      Patient seen in follow up for     PAST MEDICAL HISTORY:  Cholecystitis with cholangitis  Acute urinary retention  Liver abscess  ESRD (end stage renal disease) on dialysis  CAD (coronary artery disease)  Diabetes  CRF (chronic renal failure)  Hypertension  Pneumonia  Myocardial infarction  Hypertension  Diabetes    MEDICATIONS  (STANDING):  aspirin enteric coated 81 milliGRAM(s) Oral daily  atorvastatin 80 milliGRAM(s) Oral at bedtime  ciprofloxacin   IVPB 400 milliGRAM(s) IV Intermittent every 24 hours  dextrose 5%. 1000 milliLiter(s) (50 mL/Hr) IV Continuous <Continuous>  dextrose 50% Injectable 12.5 Gram(s) IV Push once  dextrose 50% Injectable 25 Gram(s) IV Push once  dextrose 50% Injectable 25 Gram(s) IV Push once  docusate sodium 100 milliGRAM(s) Oral daily  gabapentin 300 milliGRAM(s) Oral two times a day  heparin  Injectable 5000 Unit(s) SubCutaneous every 8 hours  insulin lispro (HumaLOG) corrective regimen sliding scale   SubCutaneous three times a day before meals  insulin lispro (HumaLOG) corrective regimen sliding scale   SubCutaneous at bedtime  isosorbide   mononitrate ER Tablet (IMDUR) 30 milliGRAM(s) Oral at bedtime  isosorbide   mononitrate ER Tablet (IMDUR) 60 milliGRAM(s) Oral daily  metoprolol tartrate 50 milliGRAM(s) Oral two times a day  NIFEdipine XL 60 milliGRAM(s) Oral daily  piperacillin/tazobactam IVPB. 3.375 Gram(s) IV Intermittent every 12 hours  senna 2 Tablet(s) Oral at bedtime  sodium chloride 0.9%. 1000 milliLiter(s) (50 mL/Hr) IV Continuous <Continuous>    MEDICATIONS  (PRN):  dextrose 40% Gel 15 Gram(s) Oral once PRN Blood Glucose LESS THAN 70 milliGRAM(s)/deciliter  glucagon  Injectable 1 milliGRAM(s) IntraMuscular once PRN Glucose LESS THAN 70 milligrams/deciliter  HYDROmorphone  Injectable 0.5 milliGRAM(s) IV Push every 6 hours PRN Moderate Pain (4 - 6)  HYDROmorphone  Injectable 1 milliGRAM(s) IV Push every 8 hours PRN Severe Pain (7 - 10)    T(C): 36.4 (01-03-19 @ 09:25), Max: 38.7 (01-02-19 @ 13:10)  HR: 62 (01-03-19 @ 09:25) (61 - 83)  BP: 105/47 (01-03-19 @ 09:25) (105/47 - 127/72)  RR: 18 (01-03-19 @ 09:25) (16 - 19)  SpO2: 98% (01-03-19 @ 09:25) (96% - 100%)  Wt(kg): --  I&O's Detail    02 Jan 2019 07:01  -  03 Jan 2019 07:00  --------------------------------------------------------  IN:    Sodium Chloride 0.9% IV Bolus: 2000 mL  Total IN: 2000 mL    OUT:  Total OUT: 0 mL    Total NET: 2000 mL          PHYSICAL EXAM:  General: NAD  Respiratory: b/l air entry  Cardiovascular: S1 S2  Gastrointestinal: soft  Extremities:                            11.5   14.34 )-----------( 189      ( 03 Jan 2019 05:43 )             36.3     01-03    135  |  99  |  52<H>  ----------------------------<  140<H>  4.4   |  27  |  6.20<H>    Ca    7.7<L>      03 Jan 2019 05:43    TPro  6.0  /  Alb  1.2<L>  /  TBili  0.8  /  DBili  x   /  AST  81<H>  /  ALT  39  /  AlkPhos  267<H>  01-03    CARDIAC MARKERS ( 03 Jan 2019 05:43 )  .041 ng/mL / x     / 83 U/L / x     / 1.6 ng/mL  CARDIAC MARKERS ( 03 Jan 2019 03:16 )  .048 ng/mL / x     / 98 U/L / x     / 1.5 ng/mL  CARDIAC MARKERS ( 02 Jan 2019 20:58 )  .054 ng/mL / x     / 148 U/L / x     / 1.7 ng/mL  CARDIAC MARKERS ( 02 Jan 2019 13:33 )  .050 ng/mL / x     / 149 U/L / x     / 1.8 ng/mL      LIVER FUNCTIONS - ( 03 Jan 2019 05:43 )  Alb: 1.2 g/dL / Pro: 6.0 g/dL / ALK PHOS: 267 U/L / ALT: 39 U/L / AST: 81 U/L / GGT: x               Sodium, Serum: 135 (01-03 @ 05:43)  Sodium, Serum: 137 (01-02 @ 13:33)    Creatinine, Serum: 6.20 (01-03 @ 05:43)  Creatinine, Serum: 6.00 (01-02 @ 13:33)    Potassium, Serum: 4.4 (01-03 @ 05:43)  Potassium, Serum: 4.2 (01-02 @ 13:33)    Hemoglobin: 11.5 (01-03 @ 05:43)  Hemoglobin: 11.7 (01-02 @ 13:33)    < from: CT Abdomen and Pelvis w/ IV Cont (01.02.19 @ 15:04) >  EXAM:  CT ABDOMEN AND PELVIS IC                          EXAM:  CT CHEST IC                            PROCEDURE DATE:  01/02/2019          INTERPRETATION:  .    CLINICAL INFORMATION: Lower chest pain.    TECHNIQUE: Helical axial images were obtained from the thoracic inlet to   the pubic symphysis. 95 mls of Omnipaque-350 was administered   intravenously without complication and 5 mls were discarded. Oral   contrast was not administered. Sagittal and coronal reconstructed images   were obtained from the source data.    COMPARISON: Most recent prior CT examination of the abdomen and pelvis   from 11/26/2018. Prior MRI examination of the abdomen from 11/27/2018.   Prior chest, abdomen, and pelvis CT examination from 10/29/2018.    FINDINGS: Scattered subcentimeter sized lymph nodes are noted within the   axillary regions, mediastinum, and hilar areas.    The heart size is enlarged. The pericardium appears unremarkable. There   is a right sided dialysis catheter with its tip at the right atrium. A   left-sided IJ central line is noted with the tip at the SVC. No filling   defects are notable within the pulmonary arterial vessels. There are   atherosclerotic calcifications of the imaged coronary arteries, aorta,   and branch vessels. The imaged portions of the aorta are normal in   caliber.    The central airways are patent. Patchy groundglass opacities are notable   throughout both lungs with resultant mosaic attenuation. Scattered areas   of linear atelectasis are notable within the bilateral lung bases, left   upper lobe, and lingula.    There is redemonstration of nonspecific gallbladder wall thickening   and/or edema and/or fat deposition. Sludge and/or stones are noted within   the lumen of the gallbladder. A cholecystostomy tube tract is notable   along the right upper anterior abdominal wall. A small amount of   pneumobilia is noted, compatible with stent patency. The common bile duct   remains dilated and measures up to 1.2 cm. A common bile duct stent is   again noted. No radiopaque stones are notable adjacent to the stent.   Previously noted abscesses within the liver appear less conspicuous   compared to the prior MRI and CT examinations. A small low-attenuation   focus is noted within the central hepatic area measuring up to 8 mm   (series 2, image 114).     The pancreas, spleen, and adrenal glands appear unremarkable.    There is atrophy of the bilateral kidneys. A left-sided renal cyst   appears unchanged. There is minimal nonspecific bilateral perinephric   thickening. There is no hydronephrosis bilaterally. Arterial vascular   calcifications are noted in the vicinity of the bilateral renal   collecting systems. The urinary bladder appears unremarkable.    There are multiple scattered nonspecificsubcentimeter retroperitoneal   and mesenteric lymph nodes.    There is no bowel obstruction or abdominal ascites. A duodenal   diverticulum appears unchanged. Gas and stool are notable throughout the   large bowel loops. The appendix is normal.    The patient is status post hysterectomy. No adnexal masses are noted.    Multilevel degenerative changes notable within the imaged spine. There is   a moderate compression deformity of the L2 vertebral body which appears   unchanged. There is also a mild superior endplate deformity of the T12   vertebral body which also appears unchanged.    Areas of mixed lucency and sclerosis adjacent to the bilateral SI joints   appear unchanged. Mild diffuse osteosclerosis may be compatible with   early renal osteodystrophy.    IMPRESSION:    CT chest:  1. Groundglass mosaic attenuation to the lungs which may reflect small   airways or small vessel disease.    CT abdomen and pelvis:  1. Chronic cholecystitis, as seen on prior exams. A superimposed acute   component cannot be completely excluded.  2. Redemonstration of a common bile duct stent with pneumobilia,   compatible with stent patency.  3. Interval decreased conspicuity of previous noted abscesses throughout   the liver with a small abscess remaining, as discussed.  4. Other chronic nonemergent findings, as discussed.    < end of copied text >

## 2019-01-03 NOTE — CONSULT NOTE ADULT - ASSESSMENT
67 yo F with PMH of ESRD on HD (M, W, F), CAD s/p stents 2007, DM, HTN, MI, biliary stent, Liver abscess who presents to the ED with abdominal pain x2 days, 4 episodes of vomiting, leukocytosis and Klebsiella bacteremia

## 2019-01-03 NOTE — CONSULT NOTE ADULT - ASSESSMENT
67 yo F with PMH of ESRD on HD (M, W, F) via permacath started in June 2018, CAD s/p stents 2007, DM type 2, HTN, MI, biliary stent, Liver abscess, obesity who presents to the ED with abdominal pain x2 days, 4 episodes of vomiting. Admitted for sepsis 2/2 acute on chronic cholecystitis vs failed treatment of liver abscess.    Patient had recent admission at [facility] November 2018 for acute cholecystitis causing sepsis, S/P Perc Noa 10/30 and ESBL E. coli bacteremia. She was treated with IV ertapenem and developed A fIb with RVR. ERCP was attempted at [facility] but was unsuccessful and was transferred to [facility] where ERCP was performed. She also had another admission in November 2018 at [place] shortly after for acute pancreatitis.     ecent admission for severe sepsis with ESBL E coli bacteremia due to ascending cholangitis with CBD stone, s/p stone extraction and stent placement in [facility],    Katelyn Black 69 yo F with PMH of ESRD on HD (M, W, F) via permacath started in June 2018, CAD s/p 1 stent 2007, DM type 2, HTN, MI, biliary stent, Liver abscess, obesity who presents to the ED with abdominal pain x2 days, 4 episodes of vomiting. Currently admitted for sepsis 2/2 acute on chronic cholecystitis vs failed treatment of liver abscess. Patient had recent admission at [facility] November 2018 for acute cholecystitis causing sepsis, S/P Perc Noa 10/30 and ESBL E. coli bacteremia. She was treated with IV ertapenem and developed A fIb with RVR. ERCP was attempted at [facility] but was unsuccessful and was transferred to [facility] where ERCP was performed with stone extraction and stent placement. She also had another admission in November 2018 at [place] shortly after for acute pancreatitis.     - No clear evidence of acute ischemia. Mild elevation in troponin likely 2/2 demand and ESRD. CKs are flat, suggesting against acute atherosclerotic plaque rupture. Monitor closely for the development of anginal symptoms or clinical signs of ischemia.   - Continue asa, plavix, statin, imdur with hx of CAD s/p 1 stent in 2007. Has been on dual antiplatelet therapy since. Outpatient cardiologist is Dr Rachel Black in [place].   - Patient is not complaining of any cardiac symptoms at this time  - No acute changes on EKG compared to previous.  - No meaningful evidence of volume overload.  - BP well controlled, monitor routine hemodynamics. Continue metoprolol and procardia  - Monitor and replete lytes, keep K>4, Mg>2.  - Other cardiovascular workup will depend on clinical course.  - All other workup per primary team.  - Will continue to follow. 67 yo F with PMH of ESRD on HD (M, W, F) via permacath started in June 2018, CAD s/p 1 stent 2007, DM type 2, HTN, MI, biliary stent, Liver abscess, obesity who presents to the ED with abdominal pain x2 days, 4 episodes of vomiting. Currently admitted for sepsis 2/2 acute on chronic cholecystitis vs failed treatment of liver abscess. Patient had recent admission at [facility] November 2018 for acute cholecystitis causing sepsis, S/P Perc Noa 10/30 and ESBL E. coli bacteremia. She was treated with IV ertapenem and developed A fIb with RVR. ERCP was attempted at [facility] but was unsuccessful and was transferred to [facility] where ERCP was performed with stone extraction and stent placement. She also had another admission in November 2018 at [place] shortly after for acute pancreatitis. Patient has been in rehab s/p last discharge from [facility]    - No clear evidence of acute ischemia. Mild elevation in troponin likely 2/2 demand and ESRD. CKs are flat, suggesting against acute atherosclerotic plaque rupture. Monitor closely for the development of anginal symptoms or clinical signs of ischemia.   - Continue asa, plavix, statin, imdur with hx of CAD s/p 1 stent in 2007. Has been on dual antiplatelet therapy since. Outpatient cardiologist is Dr Rachel Black in [place].   - Patient is not complaining of any cardiac symptoms at this time  - No acute changes on EKG compared to previous.  - No meaningful evidence of volume overload.  - BP well controlled, monitor routine hemodynamics. Continue metoprolol and procardia  - Monitor and replete lytes, keep K>4, Mg>2.  - Other cardiovascular workup will depend on clinical course.  - All other workup per primary team.  - Will continue to follow.

## 2019-01-03 NOTE — CONSULT NOTE ADULT - SUBJECTIVE AND OBJECTIVE BOX
[facility]         Glynn Bullock, Susan, Claudia, Flaquita, Morgan, Terry        [phone] (office)    Reason for Consult:    Interval HPI: Patient seen and examined at bedside. No acute events overnight.     HPI:  Pt is a 69 yo F with PMH of ESRD on HD (M, W, F), CAD s/p stents 2007, DM, HTN, MI, biliary stent, Liver abscess who presents to the ED with abdominal pain x2 days, 4 episodes of vomiting. Pt states that the abdominal pain started yesterday and feels a little better today. Pt denies exacerbating or relieving factors, pain does not change with food or movement. Pain described as non-radiating and constant and is located in the epigastrium. Pt has taken nothing for the pain. Pt had 4 episodes of non-bloody emesis and states it is yellow in color. Pt reports subjective fever and chills. Pt admits to weakness. Pt denies CP, palpitations, SOB, cough, myalgias, rash. In ED Pt's vitals were: T(C) Max: 38.7, HR: 71, BP: 127/72, RR: 16, SpO2: 97% on RA. EKG - NSR, HR 69 bpm, RBBB. CXR - no active lung disease. CT Chest/Abd/Pel - 1. Chronic cholecystitis, as seen on prior exams. A superimposed acute component cannot be completely excluded. 2. Re-demonstration of a common bile duct stent with pneumobilia, compatible with stent patency. 3. Interval decreased conspicuity of previous noted abscesses throughout the liver with a small abscess remaining, as discussed. Labs significant for WBC 17.5 with left shift, bandemia 13, initial lactate 4.0, repeat lactate 2.8 s/p 1.5L NS, BUN/Cr 52/6, Alk phos 353, AST 54. Albumin 1.3, Corrected Ca 9.6. In ED, Pt given cipro, zosyn, zofran, morphine, 1.5L NS, Tylenol. (02 Jan 2019 16:48)    At baseline she walks with a cane or walker.  Longstanding sob with activity, likely multifactorial.  No sxs of angina.    PAST MEDICAL & SURGICAL HISTORY:  Cholecystitis with cholangitis  Acute urinary retention  Liver abscess  ESRD (end stage renal disease) on dialysis: MWF  CAD (coronary artery disease): s/p stent in 2007  Diabetes  Myocardial infarction  Hypertension  H/O: hysterectomy      SOCIAL HISTORY: No active tobacco, alcohol or illicit drug use    FAMILY HISTORY:  No pertinent family history in first degree relatives      Home Medications:  epoetin analilia: 89471 unit(s) intravenous 3 times a day T/T/S  intra-dialysis  (02 Jan 2019 17:16)      MEDICATIONS  (STANDING):  aspirin enteric coated 81 milliGRAM(s) Oral daily  atorvastatin 80 milliGRAM(s) Oral at bedtime  ciprofloxacin   IVPB 400 milliGRAM(s) IV Intermittent every 12 hours  clopidogrel Tablet 75 milliGRAM(s) Oral daily  dextrose 5%. 1000 milliLiter(s) (50 mL/Hr) IV Continuous <Continuous>  dextrose 50% Injectable 12.5 Gram(s) IV Push once  dextrose 50% Injectable 25 Gram(s) IV Push once  dextrose 50% Injectable 25 Gram(s) IV Push once  docusate sodium 100 milliGRAM(s) Oral daily  gabapentin 300 milliGRAM(s) Oral two times a day  heparin  Injectable 5000 Unit(s) SubCutaneous every 8 hours  insulin lispro (HumaLOG) corrective regimen sliding scale   SubCutaneous three times a day before meals  insulin lispro (HumaLOG) corrective regimen sliding scale   SubCutaneous at bedtime  isosorbide   mononitrate ER Tablet (IMDUR) 30 milliGRAM(s) Oral at bedtime  isosorbide   mononitrate ER Tablet (IMDUR) 60 milliGRAM(s) Oral daily  metoprolol tartrate 50 milliGRAM(s) Oral two times a day  NIFEdipine XL 60 milliGRAM(s) Oral daily  piperacillin/tazobactam IVPB. 3.375 Gram(s) IV Intermittent every 12 hours  senna 2 Tablet(s) Oral at bedtime  sodium chloride 0.9%. 1000 milliLiter(s) (50 mL/Hr) IV Continuous <Continuous>    MEDICATIONS  (PRN):  dextrose 40% Gel 15 Gram(s) Oral once PRN Blood Glucose LESS THAN 70 milliGRAM(s)/deciliter  glucagon  Injectable 1 milliGRAM(s) IntraMuscular once PRN Glucose LESS THAN 70 milligrams/deciliter  HYDROmorphone  Injectable 0.5 milliGRAM(s) IV Push every 6 hours PRN Moderate Pain (4 - 6)  HYDROmorphone  Injectable 1 milliGRAM(s) IV Push every 8 hours PRN Severe Pain (7 - 10)      Allergies    ertapenem (Urticaria)  Purell (Rash)    Intolerances        REVIEW OF SYSTEMS: Negative except as per HPI.    VITAL SIGNS:   Vital Signs Last 24 Hrs  T(C): 36.4 (03 Jan 2019 08:33), Max: 38.7 (02 Jan 2019 13:10)  T(F): 97.5 (03 Jan 2019 08:33), Max: 101.7 (02 Jan 2019 13:10)  HR: 63 (03 Jan 2019 08:33) (61 - 83)  BP: 111/56 (03 Jan 2019 08:33) (111/56 - 127/72)  BP(mean): --  RR: 17 (03 Jan 2019 08:33) (16 - 19)  SpO2: 100% (03 Jan 2019 08:33) (96% - 100%)    I&O's Summary    02 Jan 2019 07:01  -  03 Jan 2019 07:00  --------------------------------------------------------  IN: 2000 mL / OUT: 0 mL / NET: 2000 mL        PHYSICAL EXAM:  Constitutional: NAD, well-developed  HEENT NC/AT, moist mucous membranes  Pulmonary: Non-labored, breath sounds are clear bilaterally, no wheezing, rales or rhonchi  Cardiovascular: +S1, S2, RRR, no murmur  Gastrointestinal: Soft, nontender, nondistended, normoactive bowel sounds  Extremities: No peripheral edema   Neurological: Alert, strength and sensitivity are grossly intact  Skin: No obvious lesions/rashes  Psych: Mood & affect appropriate    LABS: All Labs Reviewed:                        11.5   14.34 )-----------( 189      ( 03 Jan 2019 05:43 )             36.3                         11.7   17.23 )-----------( 222      ( 02 Jan 2019 13:33 )             36.9     03 Jan 2019 05:43    135    |  99     |  52     ----------------------------<  140    4.4     |  27     |  6.20   02 Jan 2019 13:33    137    |  98     |  52     ----------------------------<  171    4.2     |  27     |  6.00     Ca    7.7        03 Jan 2019 05:43  Ca    7.8        02 Jan 2019 13:33    TPro  6.0    /  Alb  1.2    /  TBili  0.8    /  DBili  x      /  AST  81     /  ALT  39     /  AlkPhos  267    03 Jan 2019 05:43  TPro  6.4    /  Alb  1.3    /  TBili  0.6    /  DBili  x      /  AST  54     /  ALT  33     /  AlkPhos  353    02 Jan 2019 13:33      CARDIAC MARKERS ( 03 Jan 2019 05:43 )  .041 ng/mL / x     / 83 U/L / x     / 1.6 ng/mL  CARDIAC MARKERS ( 03 Jan 2019 03:16 )  .048 ng/mL / x     / 98 U/L / x     / 1.5 ng/mL  CARDIAC MARKERS ( 02 Jan 2019 20:58 )  .054 ng/mL / x     / 148 U/L / x     / 1.7 ng/mL  CARDIAC MARKERS ( 02 Jan 2019 13:33 )  .050 ng/mL / x     / 149 U/L / x     / 1.8 ng/mL      Blood Culture:         EKG:    RADIOLOGY:    CXR: [facility]         Glynn Bullock, Susan, Claudia, Flaquita, Morgan, Terry        [phone] (office)    Reason for Consult:    Interval HPI: Patient seen and examined at bedside. No acute events overnight.     HPI:  Pt is a 69 yo F with PMH of ESRD on HD (M, W, F), CAD s/p stents 2007, DM, HTN, MI, biliary stent, Liver abscess who presents to the ED with abdominal pain x2 days, 4 episodes of vomiting. Pt states that the abdominal pain started yesterday and feels a little better today. Pt denies exacerbating or relieving factors, pain does not change with food or movement. Pain described as non-radiating and constant and is located in the epigastrium. Pt has taken nothing for the pain. Pt had 4 episodes of non-bloody emesis and states it is yellow in color. Pt reports subjective fever and chills. Pt admits to weakness. Pt denies CP, palpitations, SOB, cough, myalgias, rash. In ED Pt's vitals were: T(C) Max: 38.7, HR: 71, BP: 127/72, RR: 16, SpO2: 97% on RA. EKG - NSR, HR 69 bpm, RBBB. CXR - no active lung disease. CT Chest/Abd/Pel - 1. Chronic cholecystitis, as seen on prior exams. A superimposed acute component cannot be completely excluded. 2. Re-demonstration of a common bile duct stent with pneumobilia, compatible with stent patency. 3. Interval decreased conspicuity of previous noted abscesses throughout the liver with a small abscess remaining, as discussed. Labs significant for WBC 17.5 with left shift, bandemia 13, initial lactate 4.0, repeat lactate 2.8 s/p 1.5L NS, BUN/Cr 52/6, Alk phos 353, AST 54. Albumin 1.3, Corrected Ca 9.6. In ED, Pt given cipro, zosyn, zofran, morphine, 1.5L NS, Tylenol. (02 Jan 2019 16:48)    Patient seen and examined at dialysis. Patient's daughter in law and Dr Mott offer translation due to patient being Roberto speaking only. Patient's daughter in law states that patient has been in rehab since last discharge and for the past 7-10 days has been very weak with poor PO intake. Patient also had right sided arm pain and left sided leg pain day of admission. At baseline she walks with a cane or walker.  Longstanding sob with activity, likely multifactorial.  Denied chest pain, palpitations, dizziness, headaches, nunbness/tingling. Cardio is Dr Black however patient has not seen cardiologist in long time due to being in hosp and rehab for the past two months.     PAST MEDICAL & SURGICAL HISTORY:  Cholecystitis with cholangitis  Acute urinary retention  Liver abscess  ESRD (end stage renal disease) on dialysis: MWF  CAD (coronary artery disease): s/p stent in 2007  Diabetes  Myocardial infarction  Hypertension  H/O: hysterectomy      SOCIAL HISTORY: No active tobacco, alcohol or illicit drug use    FAMILY HISTORY:  No pertinent family history in first degree relatives      Home Medications:  epoetin analilia: 37654 unit(s) intravenous 3 times a day T/T/S  intra-dialysis  (02 Jan 2019 17:16)      MEDICATIONS  (STANDING):  aspirin enteric coated 81 milliGRAM(s) Oral daily  atorvastatin 80 milliGRAM(s) Oral at bedtime  ciprofloxacin   IVPB 400 milliGRAM(s) IV Intermittent every 12 hours  clopidogrel Tablet 75 milliGRAM(s) Oral daily  dextrose 5%. 1000 milliLiter(s) (50 mL/Hr) IV Continuous <Continuous>  dextrose 50% Injectable 12.5 Gram(s) IV Push once  dextrose 50% Injectable 25 Gram(s) IV Push once  dextrose 50% Injectable 25 Gram(s) IV Push once  docusate sodium 100 milliGRAM(s) Oral daily  gabapentin 300 milliGRAM(s) Oral two times a day  heparin  Injectable 5000 Unit(s) SubCutaneous every 8 hours  insulin lispro (HumaLOG) corrective regimen sliding scale   SubCutaneous three times a day before meals  insulin lispro (HumaLOG) corrective regimen sliding scale   SubCutaneous at bedtime  isosorbide   mononitrate ER Tablet (IMDUR) 30 milliGRAM(s) Oral at bedtime  isosorbide   mononitrate ER Tablet (IMDUR) 60 milliGRAM(s) Oral daily  metoprolol tartrate 50 milliGRAM(s) Oral two times a day  NIFEdipine XL 60 milliGRAM(s) Oral daily  piperacillin/tazobactam IVPB. 3.375 Gram(s) IV Intermittent every 12 hours  senna 2 Tablet(s) Oral at bedtime  sodium chloride 0.9%. 1000 milliLiter(s) (50 mL/Hr) IV Continuous <Continuous>    MEDICATIONS  (PRN):  dextrose 40% Gel 15 Gram(s) Oral once PRN Blood Glucose LESS THAN 70 milliGRAM(s)/deciliter  glucagon  Injectable 1 milliGRAM(s) IntraMuscular once PRN Glucose LESS THAN 70 milligrams/deciliter  HYDROmorphone  Injectable 0.5 milliGRAM(s) IV Push every 6 hours PRN Moderate Pain (4 - 6)  HYDROmorphone  Injectable 1 milliGRAM(s) IV Push every 8 hours PRN Severe Pain (7 - 10)      Allergies    ertapenem (Urticaria)  Purell (Rash)    Intolerances        REVIEW OF SYSTEMS: Negative except as per HPI.    VITAL SIGNS:   Vital Signs Last 24 Hrs  T(C): 36.4 (03 Jan 2019 08:33), Max: 38.7 (02 Jan 2019 13:10)  T(F): 97.5 (03 Jan 2019 08:33), Max: 101.7 (02 Jan 2019 13:10)  HR: 63 (03 Jan 2019 08:33) (61 - 83)  BP: 111/56 (03 Jan 2019 08:33) (111/56 - 127/72)  BP(mean): --  RR: 17 (03 Jan 2019 08:33) (16 - 19)  SpO2: 100% (03 Jan 2019 08:33) (96% - 100%)    I&O's Summary    02 Jan 2019 07:01  -  03 Jan 2019 07:00  --------------------------------------------------------  IN: 2000 mL / OUT: 0 mL / NET: 2000 mL        PHYSICAL EXAM:  Constitutional: NAD, well-developed  HEENT NC/AT, moist mucous membranes  Pulmonary: decreased breath sounds b/l  Cardiovascular: +S1, S2, RRR, no murmur  Gastrointestinal: tenderness to palpation in right upper quadrant, soft, nondistended, normoactive bowel sounds  Extremities: b/l 1+ pitting edema   Neurological: Alert, sensitivity are grossly intact  Psych: Mood & affect appropriate    LABS: All Labs Reviewed:                        11.5   14.34 )-----------( 189      ( 03 Jan 2019 05:43 )             36.3                         11.7   17.23 )-----------( 222      ( 02 Jan 2019 13:33 )             36.9     03 Jan 2019 05:43    135    |  99     |  52     ----------------------------<  140    4.4     |  27     |  6.20   02 Jan 2019 13:33    137    |  98     |  52     ----------------------------<  171    4.2     |  27     |  6.00     Ca    7.7        03 Jan 2019 05:43  Ca    7.8        02 Jan 2019 13:33    TPro  6.0    /  Alb  1.2    /  TBili  0.8    /  DBili  x      /  AST  81     /  ALT  39     /  AlkPhos  267    03 Jan 2019 05:43  TPro  6.4    /  Alb  1.3    /  TBili  0.6    /  DBili  x      /  AST  54     /  ALT  33     /  AlkPhos  353    02 Jan 2019 13:33      CARDIAC MARKERS ( 03 Jan 2019 05:43 )  .041 ng/mL / x     / 83 U/L / x     / 1.6 ng/mL  CARDIAC MARKERS ( 03 Jan 2019 03:16 )  .048 ng/mL / x     / 98 U/L / x     / 1.5 ng/mL  CARDIAC MARKERS ( 02 Jan 2019 20:58 )  .054 ng/mL / x     / 148 U/L / x     / 1.7 ng/mL  CARDIAC MARKERS ( 02 Jan 2019 13:33 )  .050 ng/mL / x     / 149 U/L / x     / 1.8 ng/mL      Blood Culture:         EKG:    RADIOLOGY:    CXR: [facility]         Glynn Bullock, Susan, Claudia, Flaquita, Morgan, Terry        [phone] (office)    Reason for Consult:    Interval HPI: Patient seen and examined at bedside. No acute events overnight.     HPI:  Pt is a 69 yo F with PMH of ESRD on HD (M, W, F), CAD s/p stents 2007, DM, HTN, MI, biliary stent, Liver abscess who presents to the ED with abdominal pain x2 days, 4 episodes of vomiting. Pt states that the abdominal pain started yesterday and feels a little better today. Pt denies exacerbating or relieving factors, pain does not change with food or movement. Pain described as non-radiating and constant and is located in the epigastrium. Pt has taken nothing for the pain. Pt had 4 episodes of non-bloody emesis and states it is yellow in color. Pt reports subjective fever and chills. Pt admits to weakness. Pt denies CP, palpitations, SOB, cough, myalgias, rash. In ED Pt's vitals were: T(C) Max: 38.7, HR: 71, BP: 127/72, RR: 16, SpO2: 97% on RA. EKG - NSR, HR 69 bpm, RBBB. CXR - no active lung disease. CT Chest/Abd/Pel - 1. Chronic cholecystitis, as seen on prior exams. A superimposed acute component cannot be completely excluded. 2. Re-demonstration of a common bile duct stent with pneumobilia, compatible with stent patency. 3. Interval decreased conspicuity of previous noted abscesses throughout the liver with a small abscess remaining, as discussed. Labs significant for WBC 17.5 with left shift, bandemia 13, initial lactate 4.0, repeat lactate 2.8 s/p 1.5L NS, BUN/Cr 52/6, Alk phos 353, AST 54. Albumin 1.3, Corrected Ca 9.6. In ED, Pt given cipro, zosyn, zofran, morphine, 1.5L NS, Tylenol. (02 Jan 2019 16:48)    Patient seen and examined at dialysis. Patient's daughter in law and Dr Mott offer translation due to patient being Roberto speaking only. Patient's daughter in law states that patient has been in rehab since last discharge and for the past 7-10 days has been very weak with poor PO intake. Patient also had right sided arm pain and left sided leg pain day of admission. At baseline she walks with a cane or walker.  Longstanding sob with activity, likely multifactorial.  Denied chest pain, palpitations, dizziness, headaches, nunbness/tingling. Cardio is Dr Black however patient has not seen cardiologist in long time due to being in hosp and rehab for the past two months.     PAST MEDICAL & SURGICAL HISTORY:  Cholecystitis with cholangitis  Acute urinary retention  Liver abscess  ESRD (end stage renal disease) on dialysis: MWF  CAD (coronary artery disease): s/p stent in 2007  Diabetes  Myocardial infarction  Hypertension  H/O: hysterectomy      SOCIAL HISTORY: No active tobacco, alcohol or illicit drug use    FAMILY HISTORY:  No pertinent family history in first degree relatives      Home Medications:  epoetin analilia: 19169 unit(s) intravenous 3 times a day T/T/S  intra-dialysis  (02 Jan 2019 17:16)      MEDICATIONS  (STANDING):  aspirin enteric coated 81 milliGRAM(s) Oral daily  atorvastatin 80 milliGRAM(s) Oral at bedtime  ciprofloxacin   IVPB 400 milliGRAM(s) IV Intermittent every 12 hours  clopidogrel Tablet 75 milliGRAM(s) Oral daily  dextrose 5%. 1000 milliLiter(s) (50 mL/Hr) IV Continuous <Continuous>  dextrose 50% Injectable 12.5 Gram(s) IV Push once  dextrose 50% Injectable 25 Gram(s) IV Push once  dextrose 50% Injectable 25 Gram(s) IV Push once  docusate sodium 100 milliGRAM(s) Oral daily  gabapentin 300 milliGRAM(s) Oral two times a day  heparin  Injectable 5000 Unit(s) SubCutaneous every 8 hours  insulin lispro (HumaLOG) corrective regimen sliding scale   SubCutaneous three times a day before meals  insulin lispro (HumaLOG) corrective regimen sliding scale   SubCutaneous at bedtime  isosorbide   mononitrate ER Tablet (IMDUR) 30 milliGRAM(s) Oral at bedtime  isosorbide   mononitrate ER Tablet (IMDUR) 60 milliGRAM(s) Oral daily  metoprolol tartrate 50 milliGRAM(s) Oral two times a day  NIFEdipine XL 60 milliGRAM(s) Oral daily  piperacillin/tazobactam IVPB. 3.375 Gram(s) IV Intermittent every 12 hours  senna 2 Tablet(s) Oral at bedtime  sodium chloride 0.9%. 1000 milliLiter(s) (50 mL/Hr) IV Continuous <Continuous>    MEDICATIONS  (PRN):  dextrose 40% Gel 15 Gram(s) Oral once PRN Blood Glucose LESS THAN 70 milliGRAM(s)/deciliter  glucagon  Injectable 1 milliGRAM(s) IntraMuscular once PRN Glucose LESS THAN 70 milligrams/deciliter  HYDROmorphone  Injectable 0.5 milliGRAM(s) IV Push every 6 hours PRN Moderate Pain (4 - 6)  HYDROmorphone  Injectable 1 milliGRAM(s) IV Push every 8 hours PRN Severe Pain (7 - 10)      Allergies    ertapenem (Urticaria)  Purell (Rash)    Intolerances        REVIEW OF SYSTEMS: Negative except as per HPI.    VITAL SIGNS:   Vital Signs Last 24 Hrs  T(C): 36.4 (03 Jan 2019 08:33), Max: 38.7 (02 Jan 2019 13:10)  T(F): 97.5 (03 Jan 2019 08:33), Max: 101.7 (02 Jan 2019 13:10)  HR: 63 (03 Jan 2019 08:33) (61 - 83)  BP: 111/56 (03 Jan 2019 08:33) (111/56 - 127/72)  BP(mean): --  RR: 17 (03 Jan 2019 08:33) (16 - 19)  SpO2: 100% (03 Jan 2019 08:33) (96% - 100%)    I&O's Summary    02 Jan 2019 07:01  -  03 Jan 2019 07:00  --------------------------------------------------------  IN: 2000 mL / OUT: 0 mL / NET: 2000 mL        PHYSICAL EXAM:  Constitutional: NAD, well-developed  HEENT NC/AT, moist mucous membranes  Pulmonary: decreased breath sounds b/l  Cardiovascular: +S1, S2, RRR, no murmur  Gastrointestinal: tenderness to palpation in right upper quadrant, soft, nondistended, normoactive bowel sounds  Extremities: b/l 1+ pitting edema   Neurological: Alert, sensitivity are grossly intact  Psych: Mood & affect appropriate    LABS: All Labs Reviewed:                        11.5   14.34 )-----------( 189      ( 03 Jan 2019 05:43 )             36.3                         11.7   17.23 )-----------( 222      ( 02 Jan 2019 13:33 )             36.9     03 Jan 2019 05:43    135    |  99     |  52     ----------------------------<  140    4.4     |  27     |  6.20   02 Jan 2019 13:33    137    |  98     |  52     ----------------------------<  171    4.2     |  27     |  6.00     Ca    7.7        03 Jan 2019 05:43  Ca    7.8        02 Jan 2019 13:33    TPro  6.0    /  Alb  1.2    /  TBili  0.8    /  DBili  x      /  AST  81     /  ALT  39     /  AlkPhos  267    03 Jan 2019 05:43  TPro  6.4    /  Alb  1.3    /  TBili  0.6    /  DBili  x      /  AST  54     /  ALT  33     /  AlkPhos  353    02 Jan 2019 13:33      CARDIAC MARKERS ( 03 Jan 2019 05:43 )  .041 ng/mL / x     / 83 U/L / x     / 1.6 ng/mL  CARDIAC MARKERS ( 03 Jan 2019 03:16 )  .048 ng/mL / x     / 98 U/L / x     / 1.5 ng/mL  CARDIAC MARKERS ( 02 Jan 2019 20:58 )  .054 ng/mL / x     / 148 U/L / x     / 1.7 ng/mL  CARDIAC MARKERS ( 02 Jan 2019 13:33 )  .050 ng/mL / x     / 149 U/L / x     / 1.8 ng/mL      Blood Culture:         EKG: NSR at 69 bpm with RBBB

## 2019-01-03 NOTE — CONSULT NOTE ADULT - SUBJECTIVE AND OBJECTIVE BOX
Chief Complaint:  Patient is a 68y old  Female who presents with a chief complaint of abd pain, vomiting (2019 10:06)    Cholecystitis with cholangitis  Acute urinary retention  Liver abscess  ESRD (end stage renal disease) on dialysis  CAD (coronary artery disease)  Diabetes  CRF (chronic renal failure)  Hypertension  Pneumonia  Myocardial infarction  Hypertension  Diabetes  H/O: hysterectomy     HPI:  Pt is a 69 yo F with PMH of ESRD on HD (M, W, F), CAD s/p stents 2007, DM, HTN, MI, biliary stent, Liver abscess who presents to the ED with abdominal pain x2 days, 4 episodes of vomiting. Pt states that the abdominal pain started yesterday and feels a little better today. Pt denies exacerbating or relieving factors, pain does not change with food or movement. Pain described as non-radiating and constant and is located in the epigastrium. Pt has taken nothing for the pain. Pt had 4 episodes of non-bloody emesis and states it is yellow in color. Pt reports subjective fever and chills. Pt admits to weakness. Pt denies CP, palpitations, SOB, cough, myalgias, rash.     In ED Pt's vitals were: T(C) Max: 38.7, HR: 71, BP: 127/72, RR: 16, SpO2: 97% on RA  EKG - NSR, HR 69 bpm, RBBB  CXR - no active lung disease  CT Chest/Abd/Pel - 1. Chronic cholecystitis, as seen on prior exams. A superimposed acute component cannot be completely excluded. 2. Re-demonstration of a common bile duct stent with pneumobilia, compatible with stent patency. 3. Interval decreased conspicuity of previous noted abscesses throughout the liver with a small abscess remaining, as discussed.  Labs significant for WBC 17.5 with left shift, bandemia 13, initial lactate 4.0, repeat lactate 2.8 s/p 1.5L NS, BUN/Cr 52/6, Alk phos 353, AST 54. Albumin 1.3, Corrected Ca 9.6.    In ED, Pt given cipro, zosyn, zofran, morphine, 1.5L NS, Tylenol. (2019 16:48)      ertapenem (Urticaria)  Purell (Rash)      aspirin enteric coated 81 milliGRAM(s) Oral daily  atorvastatin 80 milliGRAM(s) Oral at bedtime  ciprofloxacin   IVPB 400 milliGRAM(s) IV Intermittent every 24 hours  clopidogrel Tablet 75 milliGRAM(s) Oral daily  dextrose 40% Gel 15 Gram(s) Oral once PRN  dextrose 5%. 1000 milliLiter(s) IV Continuous <Continuous>  dextrose 50% Injectable 12.5 Gram(s) IV Push once  dextrose 50% Injectable 25 Gram(s) IV Push once  dextrose 50% Injectable 25 Gram(s) IV Push once  docusate sodium 100 milliGRAM(s) Oral daily  gabapentin 300 milliGRAM(s) Oral two times a day  glucagon  Injectable 1 milliGRAM(s) IntraMuscular once PRN  heparin  Injectable 5000 Unit(s) SubCutaneous every 8 hours  HYDROmorphone  Injectable 0.5 milliGRAM(s) IV Push every 6 hours PRN  HYDROmorphone  Injectable 1 milliGRAM(s) IV Push every 8 hours PRN  insulin lispro (HumaLOG) corrective regimen sliding scale   SubCutaneous three times a day before meals  insulin lispro (HumaLOG) corrective regimen sliding scale   SubCutaneous at bedtime  isosorbide   mononitrate ER Tablet (IMDUR) 30 milliGRAM(s) Oral at bedtime  isosorbide   mononitrate ER Tablet (IMDUR) 60 milliGRAM(s) Oral daily  metoprolol tartrate 50 milliGRAM(s) Oral two times a day  NIFEdipine XL 60 milliGRAM(s) Oral daily  piperacillin/tazobactam IVPB. 3.375 Gram(s) IV Intermittent every 12 hours  senna 2 Tablet(s) Oral at bedtime  sodium chloride 0.9%. 1000 milliLiter(s) IV Continuous <Continuous>        FAMILY HISTORY:  No pertinent family history in first degree relatives        Review of Systems:    General:  No wt loss, fevers, chills, night sweats,fatigue,   Eyes:  Good vision, no reported pain  ENT:  No sore throat, pain, runny nose, dysphagia  CV:  No pain, palpitatioins, hypo/hypertension  Resp:  No dyspnea, cough, tachypnea, wheezing  :  No pain, bleeding, incontinence, nocturia  Muscle:  No pain, weakness  Neuro:  No weakness, tingling, memory problems  Psych:  No fatigue, insomnia, mood problems, depression  Endocrine:  No polyuria, polydypsia, cold/heat intolerance  Heme:  No petechiae, ecchymosis, easy bruisability  Skin:  No rash, tattoos, scars, edema    Relevant Family History:       Relevant Social History:       Physical Exam:    Vital Signs:  Vital Signs Last 24 Hrs  T(C): 36.4 (2019 09:25), Max: 38.7 (2019 13:10)  T(F): 97.5 (2019 09:25), Max: 101.7 (2019 13:10)  HR: 62 (2019 09:25) (61 - 83)  BP: 105/47 (2019 09:25) (105/47 - 127/72)  BP(mean): --  RR: 18 (2019 09:25) (16 - 19)  SpO2: 98% (2019 09:25) (96% - 100%)  Daily Height in cm: 165.1 (2019 12:56)    Daily Weight in k.9 (2019 09:25)    General:  Appears stated age, well-groomed, well-nourished, no distress  HEENT:  NC/AT,  conjunctivae clear and pink, no thyromegaly, nodules, adenopathy, no JVD  Chest:  Full & symmetric excursion, no increased effort, breath sounds clear  Cardiovascular:  Regular rhythm, S1, S2, no murmur/rub/S3/S4, no abdominal bruit, no edema  Abdomen:  Soft, non-tender, non-distended, normoactive bowel sounds,  no masses ,no hepatosplenomeagaly, no signs of chronic liver disease  Extremities:  no cyanosis,clubbing or edema  Skin:  No rash/erythema/ecchymoses/petechiae/wounds/abscess/warm/dry  Neuro/Psych:  Alert, oriented, no asterixis, no tremor, no encephalopathy    Laboratory:                            11.5   14.34 )-----------( 189      ( 2019 05:43 )             36.3     01-03    135  |  99  |  52<H>  ----------------------------<  140<H>  4.4   |  27  |  6.20<H>    Ca    7.7<L>      2019 05:43    TPro  6.0  /  Alb  1.2<L>  /  TBili  0.8  /  DBili  x   /  AST  81<H>  /  ALT  39  /  AlkPhos  267<H>      LIVER FUNCTIONS - ( 2019 05:43 )  Alb: 1.2 g/dL / Pro: 6.0 g/dL / ALK PHOS: 267 U/L / ALT: 39 U/L / AST: 81 U/L / GGT: x               Amylase Serum--      Lipase [id number]       Ammonia--    Imaging:

## 2019-01-03 NOTE — CONSULT NOTE ADULT - PROBLEM SELECTOR RECOMMENDATION 9
acute on chronic  cbd stent still in place will need to be removed after lap kevin  lap kevin next week

## 2019-01-03 NOTE — PROGRESS NOTE ADULT - ASSESSMENT
Pt is a 69 yo F with PMH of ESRD on HD (M, W, F), CAD s/p stents 2007, DM, HTN, MI, biliary stent, Liver abscess who presents to the ED with abdominal pain x2 days, 4 episodes of vomiting. Admitted for sepsis 2/2 acute on chronic cholecystitis vs failed treatment of liver abscess.

## 2019-01-04 LAB
ANION GAP SERPL CALC-SCNC: 7 MMOL/L — SIGNIFICANT CHANGE UP (ref 5–17)
BUN SERPL-MCNC: 22 MG/DL — SIGNIFICANT CHANGE UP (ref 7–23)
CALCIUM SERPL-MCNC: 7.2 MG/DL — LOW (ref 8.5–10.1)
CHLORIDE SERPL-SCNC: 100 MMOL/L — SIGNIFICANT CHANGE UP (ref 96–108)
CO2 SERPL-SCNC: 29 MMOL/L — SIGNIFICANT CHANGE UP (ref 22–31)
CREAT SERPL-MCNC: 3.9 MG/DL — HIGH (ref 0.5–1.3)
GLUCOSE SERPL-MCNC: 125 MG/DL — HIGH (ref 70–99)
HCT VFR BLD CALC: 31.4 % — LOW (ref 34.5–45)
HGB BLD-MCNC: 10 G/DL — LOW (ref 11.5–15.5)
MCHC RBC-ENTMCNC: 28.8 PG — SIGNIFICANT CHANGE UP (ref 27–34)
MCHC RBC-ENTMCNC: 31.8 GM/DL — LOW (ref 32–36)
MCV RBC AUTO: 90.5 FL — SIGNIFICANT CHANGE UP (ref 80–100)
NRBC # BLD: 0 /100 WBCS — SIGNIFICANT CHANGE UP (ref 0–0)
PLATELET # BLD AUTO: 173 K/UL — SIGNIFICANT CHANGE UP (ref 150–400)
POTASSIUM SERPL-MCNC: 3.9 MMOL/L — SIGNIFICANT CHANGE UP (ref 3.5–5.3)
POTASSIUM SERPL-SCNC: 3.9 MMOL/L — SIGNIFICANT CHANGE UP (ref 3.5–5.3)
RBC # BLD: 3.47 M/UL — LOW (ref 3.8–5.2)
RBC # FLD: 23.9 % — HIGH (ref 10.3–14.5)
SODIUM SERPL-SCNC: 136 MMOL/L — SIGNIFICANT CHANGE UP (ref 135–145)
WBC # BLD: 12.71 K/UL — HIGH (ref 3.8–10.5)
WBC # FLD AUTO: 12.71 K/UL — HIGH (ref 3.8–10.5)

## 2019-01-04 PROCEDURE — 99232 SBSQ HOSP IP/OBS MODERATE 35: CPT

## 2019-01-04 PROCEDURE — 99233 SBSQ HOSP IP/OBS HIGH 50: CPT

## 2019-01-04 RX ORDER — CLOPIDOGREL BISULFATE 75 MG/1
75 TABLET, FILM COATED ORAL DAILY
Qty: 0 | Refills: 0 | Status: DISCONTINUED | OUTPATIENT
Start: 2019-01-04 | End: 2019-01-05

## 2019-01-04 RX ADMIN — HEPARIN SODIUM 5000 UNIT(S): 5000 INJECTION INTRAVENOUS; SUBCUTANEOUS at 22:29

## 2019-01-04 RX ADMIN — ATORVASTATIN CALCIUM 80 MILLIGRAM(S): 80 TABLET, FILM COATED ORAL at 22:29

## 2019-01-04 RX ADMIN — ISOSORBIDE MONONITRATE 30 MILLIGRAM(S): 60 TABLET, EXTENDED RELEASE ORAL at 22:29

## 2019-01-04 RX ADMIN — HEPARIN SODIUM 5000 UNIT(S): 5000 INJECTION INTRAVENOUS; SUBCUTANEOUS at 15:56

## 2019-01-04 RX ADMIN — SENNA PLUS 2 TABLET(S): 8.6 TABLET ORAL at 22:29

## 2019-01-04 RX ADMIN — GABAPENTIN 300 MILLIGRAM(S): 400 CAPSULE ORAL at 17:36

## 2019-01-04 RX ADMIN — PIPERACILLIN AND TAZOBACTAM 25 GRAM(S): 4; .5 INJECTION, POWDER, LYOPHILIZED, FOR SOLUTION INTRAVENOUS at 18:43

## 2019-01-04 RX ADMIN — HYDROMORPHONE HYDROCHLORIDE 0.5 MILLIGRAM(S): 2 INJECTION INTRAMUSCULAR; INTRAVENOUS; SUBCUTANEOUS at 17:18

## 2019-01-04 RX ADMIN — PIPERACILLIN AND TAZOBACTAM 25 GRAM(S): 4; .5 INJECTION, POWDER, LYOPHILIZED, FOR SOLUTION INTRAVENOUS at 05:12

## 2019-01-04 RX ADMIN — HEPARIN SODIUM 5000 UNIT(S): 5000 INJECTION INTRAVENOUS; SUBCUTANEOUS at 05:12

## 2019-01-04 RX ADMIN — Medication 81 MILLIGRAM(S): at 12:36

## 2019-01-04 RX ADMIN — Medication 100 MILLIGRAM(S): at 12:36

## 2019-01-04 RX ADMIN — ISOSORBIDE MONONITRATE 60 MILLIGRAM(S): 60 TABLET, EXTENDED RELEASE ORAL at 12:37

## 2019-01-04 RX ADMIN — GABAPENTIN 300 MILLIGRAM(S): 400 CAPSULE ORAL at 05:12

## 2019-01-04 RX ADMIN — HYDROMORPHONE HYDROCHLORIDE 0.5 MILLIGRAM(S): 2 INJECTION INTRAMUSCULAR; INTRAVENOUS; SUBCUTANEOUS at 17:03

## 2019-01-04 NOTE — PROGRESS NOTE ADULT - PROBLEM SELECTOR PLAN 2
Possibly acute on chronic per CT; s/p biliary stent  - Continue IV Zosyn and Cipro.  - HIDA scan was normal, without evidence of acute cholecystitis.  - Per Gen Surg (Dr. Campos), still holding Plavix for now  - Per GI (Dr. Camacho), CBD stent still in place will need to be removed after lap kevin. Possibly acute on chronic per CT; s/p biliary stent  - Continue IV Zosyn and Cipro.  - HIDA scan was normal, without evidence of acute cholecystitis.  - Per Gen Surg (Dr. Campos), still holding Plavix for now and awaiting recs on timing of lap kevin.  - Per GI (Dr. Camacho), CBD stent still in place will need to be removed after lap kevin. Possibly acute on chronic per CT; s/p biliary stent  - Continue IV Zosyn   - HIDA scan was normal, without evidence of acute cholecystitis.  - Per Gen Surg (Dr. Campos), resume plavix, will hold of on cholecystectomy for now  - Per GI (Dr. Camacho), CBD stent still in place will need to be removed after lap kevin.

## 2019-01-04 NOTE — PROGRESS NOTE ADULT - PROBLEM SELECTOR PLAN 1
acute on chronic  hida noted, neg for acute kevin  bld cxs growing gnr  currently on ivf/clears  cont abx per id  further plans as per surgery  trend lfts  cbd stent still in place, will need eventual removal  will follow

## 2019-01-04 NOTE — PROGRESS NOTE ADULT - ASSESSMENT
69 yo with multiple medical issues s/p cholecystostomy tube in past now with poss cholecystitis with negative HIDA      cont current care      cont workup for klebsiella bacteremia      to d/w team regarding timing of cholecystectomy

## 2019-01-04 NOTE — PROGRESS NOTE ADULT - SUBJECTIVE AND OBJECTIVE BOX
[facility] -- Glynn Bullock, Claudia Davis Pannella, Patel, Savella  Office # [phone]      Follow Up:    elevated troponin  Subjective/Observations:   No events overnight resting comfortably in bed.  No complaints of chest pain, dyspnea, or palpitations reported. No signs of orthopnea or PND.     REVIEW OF SYSTEMS: All other review of systems is negative unless indicated above    PAST MEDICAL & SURGICAL HISTORY:  Cholecystitis with cholangitis  Acute urinary retention  Liver abscess  ESRD (end stage renal disease) on dialysis: MWF  CAD (coronary artery disease): s/p stent in 2007  Diabetes  Myocardial infarction  Hypertension  H/O: hysterectomy      MEDICATIONS  (STANDING):  aspirin enteric coated 81 milliGRAM(s) Oral daily  atorvastatin 80 milliGRAM(s) Oral at bedtime  dextrose 5% + sodium chloride 0.45%. 1000 milliLiter(s) (60 mL/Hr) IV Continuous <Continuous>  dextrose 5%. 1000 milliLiter(s) (50 mL/Hr) IV Continuous <Continuous>  dextrose 50% Injectable 12.5 Gram(s) IV Push once  dextrose 50% Injectable 25 Gram(s) IV Push once  dextrose 50% Injectable 25 Gram(s) IV Push once  docusate sodium 100 milliGRAM(s) Oral daily  gabapentin 300 milliGRAM(s) Oral two times a day  heparin  Injectable 5000 Unit(s) SubCutaneous every 8 hours  insulin lispro (HumaLOG) corrective regimen sliding scale   SubCutaneous three times a day before meals  insulin lispro (HumaLOG) corrective regimen sliding scale   SubCutaneous at bedtime  isosorbide   mononitrate ER Tablet (IMDUR) 30 milliGRAM(s) Oral at bedtime  isosorbide   mononitrate ER Tablet (IMDUR) 60 milliGRAM(s) Oral daily  metoprolol tartrate 50 milliGRAM(s) Oral two times a day  NIFEdipine XL 60 milliGRAM(s) Oral daily  piperacillin/tazobactam IVPB. 3.375 Gram(s) IV Intermittent every 12 hours  senna 2 Tablet(s) Oral at bedtime  sodium chloride 0.9%. 1000 milliLiter(s) (50 mL/Hr) IV Continuous <Continuous>    MEDICATIONS  (PRN):  dextrose 40% Gel 15 Gram(s) Oral once PRN Blood Glucose LESS THAN 70 milliGRAM(s)/deciliter  glucagon  Injectable 1 milliGRAM(s) IntraMuscular once PRN Glucose LESS THAN 70 milligrams/deciliter  HYDROmorphone  Injectable 0.5 milliGRAM(s) IV Push every 6 hours PRN Moderate Pain (4 - 6)  HYDROmorphone  Injectable 1 milliGRAM(s) IV Push every 8 hours PRN Severe Pain (7 - 10)      Allergies    ertapenem (Urticaria)  Purell (Rash)    Intolerances        Vital Signs Last 24 Hrs  T(C): 36.8 (04 Jan 2019 08:00), Max: 37.2 (04 Jan 2019 00:38)  T(F): 98.2 (04 Jan 2019 08:00), Max: 98.9 (04 Jan 2019 00:38)  HR: 77 (04 Jan 2019 08:00) (67 - 81)  BP: 110/61 (04 Jan 2019 08:00) (92/56 - 116/46)  BP(mean): --  RR: 17 (04 Jan 2019 08:00) (17 - 19)  SpO2: 95% (04 Jan 2019 08:00) (92% - 98%)    I&O's Summary    03 Jan 2019 07:01  -  04 Jan 2019 07:00  --------------------------------------------------------  IN: 270 mL / OUT: 0 mL / NET: 270 mL          PHYSICAL EXAM:  TELE: NSR No events on tele overnight   Constitutional: NAD, awake and alert, well-developed  HEENT: Moist Mucous Membranes, Anicteric  Pulmonary: Non-labored, breath sounds with Bilateral expiratory wheezes throughout   No  crackles or rhonchi   Cardiovascular: Regular, S1 and S2 nl, No murmurs, rubs, gallops or clicks  Gastrointestinal: Bowel Sounds present, soft, nontender.   Lymph: No lymphadenopathy. No peripheral edema.  Skin: No visible rashes or ulcers.  Psych:  Mood & affect appropriate    LABS: All Labs Reviewed:                        10.0   12.71 )-----------( 173      ( 04 Jan 2019 06:43 )             31.4                         11.5   14.34 )-----------( 189      ( 03 Jan 2019 05:43 )             36.3                         11.7   17.23 )-----------( 222      ( 02 Jan 2019 13:33 )             36.9     04 Jan 2019 06:43    136    |  100    |  22     ----------------------------<  125    3.9     |  29     |  3.90   03 Jan 2019 05:43    135    |  99     |  52     ----------------------------<  140    4.4     |  27     |  6.20   02 Jan 2019 13:33    137    |  98     |  52     ----------------------------<  171    4.2     |  27     |  6.00     Ca    7.2        04 Jan 2019 06:43  Ca    7.7        03 Jan 2019 05:43  Ca    7.8        02 Jan 2019 13:33    TPro  6.0    /  Alb  1.2    /  TBili  0.8    /  DBili  x      /  AST  81     /  ALT  39     /  AlkPhos  267    03 Jan 2019 05:43  TPro  6.4    /  Alb  1.3    /  TBili  0.6    /  DBili  x      /  AST  54     /  ALT  33     /  AlkPhos  353    02 Jan 2019 13:33      CARDIAC MARKERS ( 03 Jan 2019 05:43 )  .041 ng/mL / x     / 83 U/L / x     / 1.6 ng/mL  CARDIAC MARKERS ( 03 Jan 2019 03:16 )  .048 ng/mL / x     / 98 U/L / x     / 1.5 ng/mL  CARDIAC MARKERS ( 02 Jan 2019 20:58 )  .054 ng/mL / x     / 148 U/L / x     / 1.7 ng/mL  CARDIAC MARKERS ( 02 Jan 2019 13:33 )  .050 ng/mL / x     / 149 U/L / x     / 1.8 ng/mL         ECG:  < from: 12 Lead ECG (01.02.19 @ 16:10) >  Ventricular Rate 69 BPM    Atrial Rate 69 BPM    P-R Interval 176 ms    QRS Duration 154 ms    Q-T Interval 468 ms    QTC Calculation(Bezet) 501 ms    P Axis 41 degrees    R Axis 101 degrees    T Axis -10 degrees    Diagnosis Line Normal sinus rhythm  Right bundle branch block  Inferior infarct (cited on or before 31-OCT-2018)  Abnormal ECG    Confirmed by Jovanna Hunter ([id number]) on 1/3/2019 5:19:10 PM    < end of copied text >    Echo:  < from: TTE Echo Doppler w/o Cont (05.04.18 @ 20:56) >  EXAM:  ECHO TTE W/O CON COMP W/DOPPLR         PROCEDURE DATE:  05/04/2018        INTERPRETATION:  Height 165cm. weight 95kg. blood pressure 129/81.   Technician TE. Indication for study dyspnea.    Aortic root 2.3 cm left atrium 4.4 cm left ventricular end-diastolic   diameter 5.7 cm left ventricular end-systolic diameter 4.3 cm septal wall   thickness 1.2 cm posterior wall thickness 1.2 cm visually equivocally   estimated ejection fraction 50-55%.    The aortic root is calcified and of normaldiameter. 3 distinct leaflets   of the aortic valve are not well demonstrated by this study. The   technician was unable to identify any significant gradient across this   valve to suggest hemodynamically significant aortic stenosis. Left atrium   isenlarged. The left ventricle was difficult to visualize by all   standard echocardiographic windows. Possibly the end-diastolic diameter   is mildly increased. The wall thickness is borderline increased. Any   significant focal wall motion abnormality cannot be ascertained by this   study. Visually estimated ejection fraction 50-55%. The mitral annulus is   calcified. The mitral valve leaflets are mildly thickened with a normal   EF slope and excursion. There is no evidence for hemodynamically   significant mitral stenosis. On the very limited color flow Doppler   portion examination mild mitral regurgitation suggested.    Conclusion:  1. Technically difficult limited supine study. Please note that this is a   portable study and the study is also limited due to patient's body   habitus.  2. 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  3. Enlarged left atrium with associated mild mitral regurgitation.  4. Very limited visualization left ventricle which may represent mild   left ventricular concentric hypertrophy with a well-preserved ejection   fraction as described above.  5. A very small posterior pericardial effusion is suggested but not   diagnostic.  6. Correlate these limited findings clinically.                    SALVADOR PHILLIPS M.D., ATTENDING CARDIOLOGIST  This document has been electronically signed. May  5 2018  9:47AM    < end of copied text >    Radiology:  < from: CT Chest w/ IV Cont (01.02.19 @ 15:04) >    EXAM:  CT ABDOMEN AND PELVIS IC                          EXAM:  CT CHEST IC                            PROCEDURE DATE:  01/02/2019          INTERPRETATION:  .    CLINICAL INFORMATION: Lower chest pain.    TECHNIQUE: Helical axial images were obtained from the thoracic inlet to   the pubic symphysis. 95 mls of Omnipaque-350 was administered   intravenously without complication and 5 mls were discarded. Oral   contrast was not administered. Sagittal and coronal reconstructed images   were obtained from the source data.    COMPARISON: Most recent prior CT examination of the abdomen and pelvis   from 11/26/2018. Prior MRI examination of the abdomen from 11/27/2018.   Prior chest, abdomen, and pelvis CT examination from 10/29/2018.    FINDINGS: Scattered subcentimeter sized lymph nodes are noted within the   axillary regions, mediastinum, and hilar areas.    The heart size is enlarged. The pericardium appears unremarkable. There   is a right sided dialysis catheter with its tip at the right atrium. A   left-sided IJ central line is noted with the tip at the SVC. No filling   defects are notable within the pulmonary arterial vessels. There are   atherosclerotic calcifications of the imaged coronary arteries, aorta,   and branch vessels. The imaged portions of the aorta are normal in   caliber.    The central airways are patent. Patchy groundglass opacities are notable   throughout both lungs with resultant mosaic attenuation. Scattered areas   of linear atelectasis are notable within the bilateral lung bases, left   upper lobe, and lingula.    There is redemonstration of nonspecific gallbladder wall thickening   and/or edema and/or fat deposition. Sludge and/or stones are noted within   the lumen of the gallbladder. A cholecystostomy tube tract is notable   along the right upper anterior abdominal wall. A small amount of   pneumobilia is noted, compatible with stent patency. The common bile duct   remains dilated and measures up to 1.2 cm. A common bile duct stent is   again noted. No radiopaque stones are notable adjacent to the stent.   Previously noted abscesses within the liver appear less conspicuous   compared to the prior MRI and CT examinations. A small low-attenuation   focus is noted within the central hepatic area measuring up to 8 mm   (series 2, image 114).     The pancreas, spleen, and adrenal glands appear unremarkable.    There is atrophy of the bilateral kidneys. A left-sided renal cyst   appears unchanged. There is minimal nonspecific bilateral perinephric   thickening. There is no hydronephrosis bilaterally. Arterial vascular   calcifications are noted in the vicinity of the bilateral renal   collecting systems. The urinary bladder appears unremarkable.    There are multiple scattered nonspecificsubcentimeter retroperitoneal   and mesenteric lymph nodes.    There is no bowel obstruction or abdominal ascites. A duodenal   diverticulum appears unchanged. Gas and stool are notable throughout the   large bowel loops. The appendix is normal.    The patient is status post hysterectomy. No adnexal masses are noted.    Multilevel degenerative changes notable within the imaged spine. There is   a moderate compression deformity of the L2 vertebral body which appears   unchanged. There is also a mild superior endplate deformity of the T12   vertebral body which also appears unchanged.    Areas of mixed lucency and sclerosis adjacent to the bilateral SI joints   appear unchanged. Mild diffuse osteosclerosis may be compatible with   early renal osteodystrophy.    IMPRESSION:    CT chest:  1. Groundglass mosaic attenuation to the lungs which may reflect small   airways or small vessel disease.    CT abdomen and pelvis:  1. Chronic cholecystitis, as seen on prior exams. A superimposed acute   component cannot be completely excluded.  2. Redemonstration of a common bile duct stent with pneumobilia,   compatible with stent patency.  3. Interval decreased conspicuity of previous noted abscesses throughout   the liver with a small abscess remaining, as discussed.  4. Other chronic nonemergent findings, as discussed.                CARLITO BEE M.D., ATTENDING RADIOLOGIST  This document has been electronically signed. Jan 2 2019  3:30PM    < end of copied text >  < from: Xray Abdomen 2 Views (01.02.19 @ 20:02) >  EXAM:  XR ABDOMEN 2V                            PROCEDURE DATE:  01/02/2019          INTERPRETATION:  Abdominal pain, rule out perforation.    AP spine upright. Prior 11/15/2018.    Nonspecific nonobstructive bowel gas pattern. No free air. No opaque   calculi. Vascular calcification. Biliary stent projects over the   midabdomen.    Impression: No obstruction or free air.                QUIN POTTS M.D., ATTENDING RADIOLOGIST  This document has been electronically signed. Wei  3 2019  8:38AM          < end of copied text >           [facility]   Cardiology

## 2019-01-04 NOTE — PROGRESS NOTE ADULT - SUBJECTIVE AND OBJECTIVE BOX
INTERVAL HPI/OVERNIGHT EVENTS: Patient seen and examined at bedside. No acute events overnight. Patient complains of continued RUQ pain which is lessened with her PRN pain meds. She denies any CP, SOB, N/V.    MEDICATIONS  (STANDING):  aspirin enteric coated 81 milliGRAM(s) Oral daily  atorvastatin 80 milliGRAM(s) Oral at bedtime  dextrose 5% + sodium chloride 0.45%. 1000 milliLiter(s) (60 mL/Hr) IV Continuous <Continuous>  dextrose 5%. 1000 milliLiter(s) (50 mL/Hr) IV Continuous <Continuous>  dextrose 50% Injectable 12.5 Gram(s) IV Push once  dextrose 50% Injectable 25 Gram(s) IV Push once  dextrose 50% Injectable 25 Gram(s) IV Push once  docusate sodium 100 milliGRAM(s) Oral daily  gabapentin 300 milliGRAM(s) Oral two times a day  heparin  Injectable 5000 Unit(s) SubCutaneous every 8 hours  insulin lispro (HumaLOG) corrective regimen sliding scale   SubCutaneous three times a day before meals  insulin lispro (HumaLOG) corrective regimen sliding scale   SubCutaneous at bedtime  isosorbide   mononitrate ER Tablet (IMDUR) 30 milliGRAM(s) Oral at bedtime  isosorbide   mononitrate ER Tablet (IMDUR) 60 milliGRAM(s) Oral daily  metoprolol tartrate 50 milliGRAM(s) Oral two times a day  NIFEdipine XL 60 milliGRAM(s) Oral daily  piperacillin/tazobactam IVPB. 3.375 Gram(s) IV Intermittent every 12 hours  senna 2 Tablet(s) Oral at bedtime  sodium chloride 0.9%. 1000 milliLiter(s) (50 mL/Hr) IV Continuous <Continuous>    MEDICATIONS  (PRN):  dextrose 40% Gel 15 Gram(s) Oral once PRN Blood Glucose LESS THAN 70 milliGRAM(s)/deciliter  glucagon  Injectable 1 milliGRAM(s) IntraMuscular once PRN Glucose LESS THAN 70 milligrams/deciliter  HYDROmorphone  Injectable 0.5 milliGRAM(s) IV Push every 6 hours PRN Moderate Pain (4 - 6)  HYDROmorphone  Injectable 1 milliGRAM(s) IV Push every 8 hours PRN Severe Pain (7 - 10)      Allergies    ertapenem (Urticaria)  Purell (Rash)    Intolerances        CONSTITUTIONAL: No weakness, fevers or chills  RESPIRATORY: No cough, wheezing, hemoptysis; No shortness of breath  Cvs: No chest pain or palpitations  Gi: +RUQ abdominal pain. No nausea, vomiting, diarrhea or constipation. No melena or hematochezia.  Neuro: No weakness    All other review of systems is negative unless indicated above.    Vital Signs Last 24 Hrs  T(C): 36.9 (04 Jan 2019 11:35), Max: 37.2 (04 Jan 2019 00:38)  T(F): 98.5 (04 Jan 2019 11:35), Max: 98.9 (04 Jan 2019 00:38)  HR: 77 (04 Jan 2019 11:35) (75 - 81)  BP: 113/62 (04 Jan 2019 11:35) (92/56 - 113/62)  BP(mean): --  RR: 17 (04 Jan 2019 11:35) (17 - 19)  SpO2: 95% (04 Jan 2019 11:35) (92% - 95%)    General: NAD  Neurology: A&Ox3, nonfocal  Respiratory: Scattered expiratory wheezing bilaterally  CV: RRR, S1S2  Abdominal: Soft, NT, ND +BS  Extremities: No edema, + peripheral pulses      LABS:                        10.0   12.71 )-----------( 173      ( 04 Jan 2019 06:43 )             31.4     01-04    136  |  100  |  22  ----------------------------<  125<H>  3.9   |  29  |  3.90<H>    Ca    7.2<L>      04 Jan 2019 06:43    TPro  6.0  /  Alb  1.2<L>  /  TBili  0.8  /  DBili  x   /  AST  81<H>  /  ALT  39  /  AlkPhos  267<H>  01-03        RADIOLOGY & ADDITIONAL TESTS:

## 2019-01-04 NOTE — PROGRESS NOTE ADULT - ASSESSMENT
69 yo F with PMH of ESRD on HD (M, W, F), CAD s/p stents 2007, DM, HTN, MI, biliary stent, Liver abscess who presents to the ED with abdominal pain x2 days, 4 episodes of vomiting, leukocytosis and Klebsiella bacteremia  Tender RUQ  Despite HIDA without cystic duct obstruction suspect GB remains the source  Has lines but Klebsiella less common than other potential causes of catheter-related bacteremia.  Liver abscesses smaller and tend to doubt this as source of Klebsiella.  Sensi of the isolate pending.  Prior blood Cx E. coli with ESBL, was on tigecycline to complete 6 weeks total including ertapenem previoulsy Rx'd

## 2019-01-04 NOTE — PROGRESS NOTE ADULT - ASSESSMENT
67 yo F with PMH of ESRD on HD (M, W, F) via permacath started in June 2018, CAD s/p 1 stent 2007, DM type 2, HTN, MI, biliary stent, Liver abscess, obesity who presents to the ED with abdominal pain x2 days, 4 episodes of vomiting. Currently admitted for sepsis 2/2 acute on chronic cholecystitis vs failed treatment of liver abscess. Patient had recent admission at [facility] November 2018 for acute cholecystitis causing sepsis, S/P Perc Noa 10/30 and ESBL E. coli bacteremia. She was treated with IV ertapenem and developed A fIb with RVR. ERCP was attempted at [facility] but was unsuccessful and was transferred to [facility] where ERCP was performed with stone extraction and stent placement. She also had another admission in November 2018 at [facility] shortly after for acute pancreatitis. Patient has been in rehab s/p last discharge from [facility]    - No clear evidence of acute ischemia.  Monitor closely for the development of anginal symptoms or clinical signs of ischemia.   - Continue asa, Statin, imdur with hx of CAD s/p 1 stent in 2007. Has been on dual antiplatelet therapy since. Outpatient cardiologist is Dr Rachel Black in [place].   -Plavix currently being held for possible surgical intervention please resume cleared with surgery  - No acute changes on EKG compared to previous.  - No meaningful evidence of volume overload.  - BP well controlled, monitor routine hemodynamics. Continue metoprolol and procardia  - Monitor and replete lytes, keep K>4, Mg>2.  - Other cardiovascular workup will depend on clinical course.  - All other workup per primary team.  - Will continue to follow.  Ab Linda [facility]  Cardiology 67 yo F with PMH of ESRD on HD (M, W, F) via permacath started in June 2018, CAD s/p 1 stent 2007, DM type 2, HTN, MI, biliary stent, Liver abscess, obesity who presents to the ED with abdominal pain x2 days, 4 episodes of vomiting. Currently admitted for sepsis 2/2 acute on chronic cholecystitis vs failed treatment of liver abscess. Patient had recent admission at [facility] November 2018 for acute cholecystitis causing sepsis, S/P Perc Noa 10/30 and ESBL E. coli bacteremia. She was treated with IV ertapenem and developed A fIb with RVR. ERCP was attempted at [facility] but was unsuccessful and was transferred to [facility] where ERCP was performed with stone extraction and stent placement. She also had another admission in November 2018 at [place] shortly after for acute pancreatitis. Patient has been in rehab s/p last discharge from [facility]    - No clear evidence of acute ischemia.  Monitor closely for the development of anginal symptoms or clinical signs of ischemia.   - Continue asa, Statin, imdur with hx of CAD s/p 1 stent in 2007. Has been on dual antiplatelet therapy since. Outpatient cardiologist is Dr Rachel Black in [place].   -Plavix currently being held for possible surgical intervention; please resume when cleared by surgery  - No acute changes on EKG compared to previous.  - No meaningful evidence of volume overload.  - BP well controlled, monitor routine hemodynamics. Continue metoprolol and procardia  - Monitor and replete lytes, keep K>4, Mg>2.  - Other cardiovascular workup will depend on clinical course.  - All other workup per primary team.  - Will continue to follow.  Ab Linda [facility]  Cardiology

## 2019-01-04 NOTE — PROGRESS NOTE ADULT - ASSESSMENT
·	ESRD on HD  ·	Klebsiella bacteremia  ·	s/p recent ERCP and CBD stone extraction @ [facility], h/o Liver abscess  ·	Diabetes  ·	Hypertension    HD today. To continue HD TIW as scheduled. Fluid removal as tolerated by BP. Dietary and PO fluid restriction.   Monitor BP trend. Titrate BP meds as needed. Salt restriction. ID follow up. IV abx.    Monitor blood sugar levels. Insulin coverage as needed. Surgery follow up.

## 2019-01-04 NOTE — PROGRESS NOTE ADULT - PROBLEM SELECTOR PLAN 1
Pt met sepsis criteria (fever, leukocytosis, source) on admission, likely 2/2 acute on chronic cholecystitis vs. failed Abx tx of liver abscess.  - CT Chest/Abd/Pel showed possible acute kevin on chronic kevin, decreased conspicousity of liver abscess - small abscess remains.  - Lactate downtrended to wnl.  - Continue pain regimen and Tylenol PRN for fever.  - WBC continues to trend down.  - BCx positive for GNR. f/u next set.  - Continue IV Zosyn and Cipro.  - f/u ID recs (Dr. Solis). Pt met sepsis criteria (fever, leukocytosis, source) on admission, likely 2/2 acute on chronic cholecystitis vs. failed Abx tx of liver abscess.  - CT Chest/Abd/Pel showed possible acute kevin on chronic kevin, decreased conspicousity of liver abscess - small abscess remains.  - Lactate downtrended to wnl.  - Continue pain regimen and Tylenol PRN for fever.  - WBC continues to trend down.  - BCx positive for GNR. f/u next set.  - Continue IV Zosyn  - f/u ID recs (Dr. Solis).

## 2019-01-04 NOTE — PROGRESS NOTE ADULT - PROBLEM SELECTOR PLAN 3
LFT not overly impressive at present  ?Intermittent obstruction with stones/sludge as cause of sx.  ?GB still the issue

## 2019-01-04 NOTE — PROGRESS NOTE ADULT - SUBJECTIVE AND OBJECTIVE BOX
Hospital day #  2    SUBJECTIVE:  67 y/o F examined at bedside with no acute overnight events. Pt c/o mild ruq pain, however in NAD. Patient tolerating CLD. Pt denies any headaches, chest pain, shortness of breath, palpitations, nausea, vomiting, diarrhea, fevers, chills.        Vital Signs Last 24 Hrs  T(C): 36.8 (04 Jan 2019 08:00), Max: 37.2 (04 Jan 2019 00:38)  T(F): 98.2 (04 Jan 2019 08:00), Max: 98.9 (04 Jan 2019 00:38)  HR: 77 (04 Jan 2019 08:00) (67 - 81)  BP: 110/61 (04 Jan 2019 08:00) (92/56 - 116/46)  BP(mean): --  RR: 17 (04 Jan 2019 08:00) (17 - 19)  SpO2: 95% (04 Jan 2019 08:00) (92% - 98%)    PHYSICAL EXAM:  GENERAL: No acute distress  ABDOMEN: Soft, mild TTP of RUQ, non-distended; normal bowel sounds  NEUROLOGY: A&Ox3,    I&O's Summary    03 Jan 2019 07:01  -  04 Jan 2019 07:00  --------------------------------------------------------  IN: 270 mL / OUT: 0 mL / NET: 270 mL      I&O's Detail    03 Jan 2019 07:01  -  04 Jan 2019 07:00  --------------------------------------------------------  IN:    Other: 270 mL  Total IN: 270 mL    OUT:  Total OUT: 0 mL    Total NET: 270 mL          MEDICATIONS  (STANDING):  aspirin enteric coated 81 milliGRAM(s) Oral daily  atorvastatin 80 milliGRAM(s) Oral at bedtime  dextrose 5% + sodium chloride 0.45%. 1000 milliLiter(s) (60 mL/Hr) IV Continuous <Continuous>  dextrose 5%. 1000 milliLiter(s) (50 mL/Hr) IV Continuous <Continuous>  dextrose 50% Injectable 12.5 Gram(s) IV Push once  dextrose 50% Injectable 25 Gram(s) IV Push once  dextrose 50% Injectable 25 Gram(s) IV Push once  docusate sodium 100 milliGRAM(s) Oral daily  gabapentin 300 milliGRAM(s) Oral two times a day  heparin  Injectable 5000 Unit(s) SubCutaneous every 8 hours  insulin lispro (HumaLOG) corrective regimen sliding scale   SubCutaneous three times a day before meals  insulin lispro (HumaLOG) corrective regimen sliding scale   SubCutaneous at bedtime  isosorbide   mononitrate ER Tablet (IMDUR) 30 milliGRAM(s) Oral at bedtime  isosorbide   mononitrate ER Tablet (IMDUR) 60 milliGRAM(s) Oral daily  metoprolol tartrate 50 milliGRAM(s) Oral two times a day  NIFEdipine XL 60 milliGRAM(s) Oral daily  piperacillin/tazobactam IVPB. 3.375 Gram(s) IV Intermittent every 12 hours  senna 2 Tablet(s) Oral at bedtime  sodium chloride 0.9%. 1000 milliLiter(s) (50 mL/Hr) IV Continuous <Continuous>    MEDICATIONS  (PRN):  dextrose 40% Gel 15 Gram(s) Oral once PRN Blood Glucose LESS THAN 70 milliGRAM(s)/deciliter  glucagon  Injectable 1 milliGRAM(s) IntraMuscular once PRN Glucose LESS THAN 70 milligrams/deciliter  HYDROmorphone  Injectable 0.5 milliGRAM(s) IV Push every 6 hours PRN Moderate Pain (4 - 6)  HYDROmorphone  Injectable 1 milliGRAM(s) IV Push every 8 hours PRN Severe Pain (7 - 10)      LABS:                        10.0   12.71 )-----------( 173      ( 04 Jan 2019 06:43 )             31.4     01-04    136  |  100  |  22  ----------------------------<  125<H>      3.9   |  29  |  3.90<H>    Ca    7.2<L>      04 Jan 2019 06:43    TPro  6.0  /  Alb  1.2<L>  /  TBili  0.8  /  DBili  x   /  AST  81<H>  /  ALT  39  /  AlkPhos  267<H>  01-03      RADIOLOGY & ADDITIONAL STUDIES:  < from: [facility] Hepatobiliary Imaging (01.03.19 @ 18:18) >  EXAM:  [facility] HEPATOBILIARY IMG                            PROCEDURE DATE:  01/03/2019     < end of copied text >  < from: [facility] Hepatobiliary Imaging (01.03.19 @ 18:18) >  FINDINGS: There is prompt uptake of the injected radiotracer by the   hepatocytes. The gallbladder is first visualized at 55 minutes post   tracer injection and bowel activity by 35 minutes. There is normal tracer   clearance from the liver by the end of the study.     IMPRESSION: Normal hepatobiliary scan.     No scan evidence of acute cholecystitis.     < end of copied text >    ASSESSMENT  67 yo with multiple medical issues s/p cholecystostomy tube in past now with poss cholecystitis with negative HIDA    PLAN  - Cont CLD  - Cont heparin dvt prophylaxis  - case to be discussed with Dr. Campos Hospital day #  2    SUBJECTIVE:  67 y/o F examined at bedside with no acute overnight events. Pt c/o mild ruq pain, however in NAD. Patient tolerating CLD. Pt denies any headaches, chest pain, shortness of breath, palpitations, nausea, vomiting, diarrhea, fevers, chills.        Vital Signs Last 24 Hrs  T(C): 36.8 (04 Jan 2019 08:00), Max: 37.2 (04 Jan 2019 00:38)  T(F): 98.2 (04 Jan 2019 08:00), Max: 98.9 (04 Jan 2019 00:38)  HR: 77 (04 Jan 2019 08:00) (67 - 81)  BP: 110/61 (04 Jan 2019 08:00) (92/56 - 116/46)  BP(mean): --  RR: 17 (04 Jan 2019 08:00) (17 - 19)  SpO2: 95% (04 Jan 2019 08:00) (92% - 98%)    PHYSICAL EXAM:  GENERAL: No acute distress  ABDOMEN: Soft, mild TTP of RUQ, non-distended; normal bowel sounds  NEUROLOGY: A&Ox3,    I&O's Summary    03 Jan 2019 07:01  -  04 Jan 2019 07:00  --------------------------------------------------------  IN: 270 mL / OUT: 0 mL / NET: 270 mL      I&O's Detail    03 Jan 2019 07:01  -  04 Jan 2019 07:00  --------------------------------------------------------  IN:    Other: 270 mL  Total IN: 270 mL    OUT:  Total OUT: 0 mL    Total NET: 270 mL          MEDICATIONS  (STANDING):  aspirin enteric coated 81 milliGRAM(s) Oral daily  atorvastatin 80 milliGRAM(s) Oral at bedtime  dextrose 5% + sodium chloride 0.45%. 1000 milliLiter(s) (60 mL/Hr) IV Continuous <Continuous>  dextrose 5%. 1000 milliLiter(s) (50 mL/Hr) IV Continuous <Continuous>  dextrose 50% Injectable 12.5 Gram(s) IV Push once  dextrose 50% Injectable 25 Gram(s) IV Push once  dextrose 50% Injectable 25 Gram(s) IV Push once  docusate sodium 100 milliGRAM(s) Oral daily  gabapentin 300 milliGRAM(s) Oral two times a day  heparin  Injectable 5000 Unit(s) SubCutaneous every 8 hours  insulin lispro (HumaLOG) corrective regimen sliding scale   SubCutaneous three times a day before meals  insulin lispro (HumaLOG) corrective regimen sliding scale   SubCutaneous at bedtime  isosorbide   mononitrate ER Tablet (IMDUR) 30 milliGRAM(s) Oral at bedtime  isosorbide   mononitrate ER Tablet (IMDUR) 60 milliGRAM(s) Oral daily  metoprolol tartrate 50 milliGRAM(s) Oral two times a day  NIFEdipine XL 60 milliGRAM(s) Oral daily  piperacillin/tazobactam IVPB. 3.375 Gram(s) IV Intermittent every 12 hours  senna 2 Tablet(s) Oral at bedtime  sodium chloride 0.9%. 1000 milliLiter(s) (50 mL/Hr) IV Continuous <Continuous>    MEDICATIONS  (PRN):  dextrose 40% Gel 15 Gram(s) Oral once PRN Blood Glucose LESS THAN 70 milliGRAM(s)/deciliter  glucagon  Injectable 1 milliGRAM(s) IntraMuscular once PRN Glucose LESS THAN 70 milligrams/deciliter  HYDROmorphone  Injectable 0.5 milliGRAM(s) IV Push every 6 hours PRN Moderate Pain (4 - 6)  HYDROmorphone  Injectable 1 milliGRAM(s) IV Push every 8 hours PRN Severe Pain (7 - 10)      LABS:                        10.0   12.71 )-----------( 173      ( 04 Jan 2019 06:43 )             31.4     01-04    136  |  100  |  22  ----------------------------<  125<H>      3.9   |  29  |  3.90<H>    Ca    7.2<L>      04 Jan 2019 06:43    TPro  6.0  /  Alb  1.2<L>  /  TBili  0.8  /  DBili  x   /  AST  81<H>  /  ALT  39  /  AlkPhos  267<H>  01-03      RADIOLOGY & ADDITIONAL STUDIES:  < from: [facility] Hepatobiliary Imaging (01.03.19 @ 18:18) >  EXAM:  NM HEPATOBILIARY IMG                            PROCEDURE DATE:  01/03/2019     < end of copied text >  < from: [facility] Hepatobiliary Imaging (01.03.19 @ 18:18) >  FINDINGS: There is prompt uptake of the injected radiotracer by the   hepatocytes. The gallbladder is first visualized at 55 minutes post   tracer injection and bowel activity by 35 minutes. There is normal tracer   clearance from the liver by the end of the study.     IMPRESSION: Normal hepatobiliary scan.     No scan evidence of acute cholecystitis.     < end of copied text >

## 2019-01-04 NOTE — PROGRESS NOTE ADULT - ASSESSMENT
Pt is a 67 yo F with PMH of ESRD on HD (M, W, F), CAD s/p stents 2007, DM, HTN, MI, biliary stent, Liver abscess who presents to the ED with abdominal pain x2 days, 4 episodes of vomiting. Admitted for sepsis 2/2 acute on chronic cholecystitis vs. failed treatment of liver abscess.

## 2019-01-04 NOTE — PROGRESS NOTE ADULT - SUBJECTIVE AND OBJECTIVE BOX
Patient is a 68y old  Female who presents with a chief complaint of abd pain, vomiting (03 Jan 2019 12:08)      Patient seen in follow up for ESRD on HD. Blood c/s positive for Klebsiella    PAST MEDICAL HISTORY:  Cholecystitis with cholangitis  Acute urinary retention  Liver abscess  ESRD (end stage renal disease) on dialysis  CAD (coronary artery disease)  Diabetes  CRF (chronic renal failure)  Hypertension  Pneumonia  Myocardial infarction  Hypertension  Diabetes    MEDICATIONS  (STANDING):  aspirin enteric coated 81 milliGRAM(s) Oral daily  atorvastatin 80 milliGRAM(s) Oral at bedtime  dextrose 5% + sodium chloride 0.45%. 1000 milliLiter(s) (60 mL/Hr) IV Continuous <Continuous>  dextrose 5%. 1000 milliLiter(s) (50 mL/Hr) IV Continuous <Continuous>  dextrose 50% Injectable 12.5 Gram(s) IV Push once  dextrose 50% Injectable 25 Gram(s) IV Push once  dextrose 50% Injectable 25 Gram(s) IV Push once  docusate sodium 100 milliGRAM(s) Oral daily  gabapentin 300 milliGRAM(s) Oral two times a day  heparin  Injectable 5000 Unit(s) SubCutaneous every 8 hours  insulin lispro (HumaLOG) corrective regimen sliding scale   SubCutaneous three times a day before meals  insulin lispro (HumaLOG) corrective regimen sliding scale   SubCutaneous at bedtime  isosorbide   mononitrate ER Tablet (IMDUR) 30 milliGRAM(s) Oral at bedtime  isosorbide   mononitrate ER Tablet (IMDUR) 60 milliGRAM(s) Oral daily  metoprolol tartrate 50 milliGRAM(s) Oral two times a day  NIFEdipine XL 60 milliGRAM(s) Oral daily  piperacillin/tazobactam IVPB. 3.375 Gram(s) IV Intermittent every 12 hours  senna 2 Tablet(s) Oral at bedtime  sodium chloride 0.9%. 1000 milliLiter(s) (50 mL/Hr) IV Continuous <Continuous>    MEDICATIONS  (PRN):  dextrose 40% Gel 15 Gram(s) Oral once PRN Blood Glucose LESS THAN 70 milliGRAM(s)/deciliter  glucagon  Injectable 1 milliGRAM(s) IntraMuscular once PRN Glucose LESS THAN 70 milligrams/deciliter  HYDROmorphone  Injectable 0.5 milliGRAM(s) IV Push every 6 hours PRN Moderate Pain (4 - 6)  HYDROmorphone  Injectable 1 milliGRAM(s) IV Push every 8 hours PRN Severe Pain (7 - 10)    T(C): 36.9 (01-04-19 @ 11:35), Max: 37.2 (01-04-19 @ 00:38)  HR: 77 (01-04-19 @ 11:35) (61 - 81)  BP: 113/62 (01-04-19 @ 11:35) (92/56 - 124/65)  RR: 17 (01-04-19 @ 11:35)  SpO2: 95% (01-04-19 @ 11:35)  Wt(kg): --  I&O's Detail    03 Jan 2019 07:01  -  04 Jan 2019 07:00  --------------------------------------------------------  IN:    Other: 270 mL  Total IN: 270 mL    OUT:  Total OUT: 0 mL    Total NET: 270 mL    PHYSICAL EXAM:  General: NAD  Respiratory: b/l air entry  Cardiovascular: S1 S2  Gastrointestinal: soft  Extremities:  edema                  LABORATORY:                        10.0   12.71 )-----------( 173      ( 04 Jan 2019 06:43 )             31.4     01-04    136  |  100  |  22  ----------------------------<  125<H>  3.9   |  29  |  3.90<H>    Ca    7.2<L>      04 Jan 2019 06:43    TPro  6.0  /  Alb  1.2<L>  /  TBili  0.8  /  DBili  x   /  AST  81<H>  /  ALT  39  /  AlkPhos  267<H>  01-03    Sodium, Serum: 136 mmol/L (01-04 @ 06:43)  Sodium, Serum: 135 mmol/L (01-03 @ 05:43)    Potassium, Serum: 3.9 mmol/L (01-04 @ 06:43)  Potassium, Serum: 4.4 mmol/L (01-03 @ 05:43)    Hemoglobin: 10.0 g/dL (01-04 @ 06:43)  Hemoglobin: 11.5 g/dL (01-03 @ 05:43)  Hemoglobin: 11.7 g/dL (01-02 @ 13:33)    Creatinine, Serum 3.90 (01-04 @ 06:43)  Creatinine, Serum 6.20 (01-03 @ 05:43)  Creatinine, Serum 6.00 (01-02 @ 13:33)    CARDIAC MARKERS ( 03 Jan 2019 05:43 )  .041 ng/mL / x     / 83 U/L / x     / 1.6 ng/mL  CARDIAC MARKERS ( 03 Jan 2019 03:16 )  .048 ng/mL / x     / 98 U/L / x     / 1.5 ng/mL  CARDIAC MARKERS ( 02 Jan 2019 20:58 )  .054 ng/mL / x     / 148 U/L / x     / 1.7 ng/mL      LIVER FUNCTIONS - ( 03 Jan 2019 05:43 )  Alb: 1.2 g/dL / Pro: 6.0 g/dL / ALK PHOS: 267 U/L / ALT: 39 U/L / AST: 81 U/L / GGT: x

## 2019-01-04 NOTE — PROGRESS NOTE ADULT - SUBJECTIVE AND OBJECTIVE BOX
[facility], Division of Infectious Diseases  DEB Barbosa A. Lee  [phone]    Name: IRENE TAYLOR  Age: 68y  Gender: Female  MRN: [id number]    Interval History--  Notes reviewed. HIDA without cystic duct obstruction.  Patient still complains of pain.     Past Medical History--  Cholecystitis with cholangitis  Acute urinary retention  Liver abscess  ESRD (end stage renal disease) on dialysis  CAD (coronary artery disease)  Diabetes  CRF (chronic renal failure)  Hypertension  Pneumonia  Myocardial infarction  Hypertension  Diabetes  H/O: hysterectomy      For details regarding the patient's social history, family history, and other miscellaneous elements, please refer the initial infectious diseases consultation and/or the admitting history and physical examination for this admission.    Allergies    ertapenem (Urticaria)  Purell (Rash)    Intolerances        Medications--  Antibiotics:  piperacillin/tazobactam IVPB. 3.375 Gram(s) IV Intermittent every 12 hours    Immunologic:    Other:  aspirin enteric coated  atorvastatin  clopidogrel Tablet  dextrose 40% Gel PRN  dextrose 5% + sodium chloride 0.45%.  dextrose 5%.  dextrose 50% Injectable  dextrose 50% Injectable  dextrose 50% Injectable  docusate sodium  gabapentin  glucagon  Injectable PRN  heparin  Injectable  HYDROmorphone  Injectable PRN  HYDROmorphone  Injectable PRN  insulin lispro (HumaLOG) corrective regimen sliding scale  insulin lispro (HumaLOG) corrective regimen sliding scale  isosorbide   mononitrate ER Tablet (IMDUR)  isosorbide   mononitrate ER Tablet (IMDUR)  metoprolol tartrate  NIFEdipine XL  senna  sodium chloride 0.9%.      Review of Systems--  A 10-point review of systems was obtained.   Review of systems otherwise negative except as previously noted.    Physical Examination--  Vital Signs: T(F): 98.3 (01-04-19 @ 15:42), Max: 98.9 (01-04-19 @ 00:38)  HR: 78 (01-04-19 @ 15:42)  BP: 108/59 (01-04-19 @ 15:42)  RR: 17 (01-04-19 @ 15:42)  SpO2: 96% (01-04-19 @ 15:42)  Wt(kg): --    HEENT: AT/NC. Anicteric. Conjunctiva pink and moist. Oropharynx clear.  Neck: Not rigid. No sense of mass.  Nodes: None palpable.  Chest: HD cath C/D/I R ACW. CVL LSCV position C/D/I.  Lungs: Diminished breath sounds bilaterally without rales, wheezing or rhonchi  Heart: Regular rate and rhythm. No Murmur. No rub. No gallop. No palpable thrill.  Abdomen: Bowel sounds present and normoactive. Soft. Nondistended. Tender RUQ> midepigastrium/LUQ. No guarding or rebound. No sense of mass. No organomegaly.  Extremities: No cyanosis or clubbing. 1+ edema.   Skin: Warm. Dry. Good turgor. No vasculitic stigmata.  Psychiatric: Appropriate affect and mood for situation.     Laboratory Studies--  CBC                        10.0   12.71 )-----------( 173      ( 04 Jan 2019 06:43 )             31.4       Chemistries  01-04    136  |  100  |  22  ----------------------------<  125<H>  3.9   |  29  |  3.90<H>    Ca    7.2<L>      04 Jan 2019 06:43    TPro  6.0  /  Alb  1.2<L>  /  TBili  0.8  /  DBili  x   /  AST  81<H>  /  ALT  39  /  AlkPhos  267<H>  01-03      Culture Data    Culture - Blood (collected 02 Jan 2019 18:33)  Source: .Blood Blood-Peripheral  Gram Stain (03 Jan 2019 11:00):    Growth in aerobic bottle: Gram Negative Rods  Preliminary Report (04 Jan 2019 13:37):    Growth in aerobic bottle: Klebsiella pneumoniae    See previous culture [id number]    Culture - Blood (collected 02 Jan 2019 18:32)  Source: .Blood Blood-Peripheral  Gram Stain (03 Jan 2019 15:40):    Growth in aerobic and anaerobic bottles: Gram Negative Rods  Preliminary Report (04 Jan 2019 13:37):    Growth in aerobic and anaerobic bottles: Klebsiella pneumoniae    "Due to technical problems, Proteus sp. will Not be reported as part of    the BCID panel until further notice"    ***Blood Panel PCR results on this specimen are available    approximately 3 hours after the Gram stain result.***    Gram stain, PCR, and/or culture results may not always    correspond due to difference in methodologies.    ************************************************************    This PCR assay was performed using [employer].    The following targets are tested for: Enterococcus,    vancomycin resistant enterococci, Listeria monocytogenes,    coagulase negative staphylococci, S. aureus,    methicillin resistant S. aureus, Streptococcus agalactiae    (Group B), S. pneumoniae, S. pyogenes (Group A),    Acinetobacter baumannii, Enterobacter cloacae, E. coli,    Klebsiella oxytoca, K. pneumoniae, Proteus sp.,    Serratia marcescens, Haemophilus influenzae,    Neisseria meningitidis, Pseudomonas aeruginosa, Candida    albicans, C. glabrata, C krusei, C parapsilosis,    C. tropicalis and the KPC resistance gene.  Organism: Blood Culture PCR (03 Jan 2019 10:59)  Organism: Blood Culture PCR (03 Jan 2019 10:59)

## 2019-01-04 NOTE — PROGRESS NOTE ADULT - SUBJECTIVE AND OBJECTIVE BOX
INTERVAL HPI/OVERNIGHT EVENTS:  pt seen and examined  denies abd pain  per overnight rn no vomiting, no diarrhea, no c/o abd pain, afebrile  labs noted    MEDICATIONS  (STANDING):  aspirin enteric coated 81 milliGRAM(s) Oral daily  atorvastatin 80 milliGRAM(s) Oral at bedtime  dextrose 5% + sodium chloride 0.45%. 1000 milliLiter(s) (60 mL/Hr) IV Continuous <Continuous>  dextrose 5%. 1000 milliLiter(s) (50 mL/Hr) IV Continuous <Continuous>  dextrose 50% Injectable 12.5 Gram(s) IV Push once  dextrose 50% Injectable 25 Gram(s) IV Push once  dextrose 50% Injectable 25 Gram(s) IV Push once  docusate sodium 100 milliGRAM(s) Oral daily  gabapentin 300 milliGRAM(s) Oral two times a day  heparin  Injectable 5000 Unit(s) SubCutaneous every 8 hours  insulin lispro (HumaLOG) corrective regimen sliding scale   SubCutaneous three times a day before meals  insulin lispro (HumaLOG) corrective regimen sliding scale   SubCutaneous at bedtime  isosorbide   mononitrate ER Tablet (IMDUR) 30 milliGRAM(s) Oral at bedtime  isosorbide   mononitrate ER Tablet (IMDUR) 60 milliGRAM(s) Oral daily  metoprolol tartrate 50 milliGRAM(s) Oral two times a day  NIFEdipine XL 60 milliGRAM(s) Oral daily  piperacillin/tazobactam IVPB. 3.375 Gram(s) IV Intermittent every 12 hours  senna 2 Tablet(s) Oral at bedtime  sodium chloride 0.9%. 1000 milliLiter(s) (50 mL/Hr) IV Continuous <Continuous>    MEDICATIONS  (PRN):  dextrose 40% Gel 15 Gram(s) Oral once PRN Blood Glucose LESS THAN 70 milliGRAM(s)/deciliter  glucagon  Injectable 1 milliGRAM(s) IntraMuscular once PRN Glucose LESS THAN 70 milligrams/deciliter  HYDROmorphone  Injectable 0.5 milliGRAM(s) IV Push every 6 hours PRN Moderate Pain (4 - 6)  HYDROmorphone  Injectable 1 milliGRAM(s) IV Push every 8 hours PRN Severe Pain (7 - 10)      Allergies    ertapenem (Urticaria)  Purell (Rash)    Intolerances        Review of Systems:    General:  No wt loss, fevers, chills, night sweats, fatigue   Eyes:  Good vision, no reported pain  ENT:  No sore throat, pain, runny nose, dysphagia  CV:  No pain, palpitations, hypo/hypertension  Resp:  No dyspnea, cough, tachypnea, wheezing  GI:  No pain, No nausea, No vomiting, No diarrhea, No constipation, No weight loss, No fever, No pruritis, No rectal bleeding, No melena, No dysphagia  :  No pain, bleeding, incontinence, nocturia  Muscle:  No pain, weakness  Neuro:  No weakness, tingling, memory problems  Psych:  No fatigue, insomnia, mood problems, depression  Endocrine:  No polyuria, polydypsia, cold/heat intolerance  Heme:  No petechiae, ecchymosis, easy bruisability  Skin:  No rash, tattoos, scars, edema      Vital Signs Last 24 Hrs  T(C): 36.8 (04 Jan 2019 08:00), Max: 37.2 (04 Jan 2019 00:38)  T(F): 98.2 (04 Jan 2019 08:00), Max: 98.9 (04 Jan 2019 00:38)  HR: 77 (04 Jan 2019 08:00) (67 - 81)  BP: 110/61 (04 Jan 2019 08:00) (92/56 - 116/46)  BP(mean): --  RR: 17 (04 Jan 2019 08:00) (17 - 19)  SpO2: 95% (04 Jan 2019 08:00) (92% - 98%)    PHYSICAL EXAM:    Constitutional: NAD  HEENT: ncat  Neck: No LAD  Respiratory: dec bs  Cardiovascular: S1 and S2, RRR  Gastrointestinal: obese soft mild ttp ruq/epigastric region no guarding nd  Extremities: trace edema  Vascular: 2+ peripheral pulses  Neurological: awake alert  Skin: No rashes      LABS:                        10.0   12.71 )-----------( 173      ( 04 Jan 2019 06:43 )             31.4     01-04    136  |  100  |  22  ----------------------------<  125<H>  3.9   |  29  |  3.90<H>    Ca    7.2<L>      04 Jan 2019 06:43    TPro  6.0  /  Alb  1.2<L>  /  TBili  0.8  /  DBili  x   /  AST  81<H>  /  ALT  39  /  AlkPhos  267<H>  01-03          RADIOLOGY & ADDITIONAL TESTS:  < from: [facility] Hepatobiliary Imaging (01.03.19 @ 18:18) >    EXAM:  [facility] HEPATOBILIARY IMG                            PROCEDURE DATE:  01/03/2019          INTERPRETATION:    RADIOPHARMACEUTICAL:  99mTc-Mebrofenin  DOSE: 3.0 mCi IV    CLINICAL INFORMATION: 68 year old year old female with epigastric pain   and fever, referred to evaluate for acute cholecystitis    TECHNIQUE: Dynamic images of the anterior abdomen were obtained for 90   minutes immediately following radiotracer injection. Static images of the   abdomen in the anterior, right anterior obliqueand right lateral   projections were also obtained.    COMPARISON: No previous hepatobiliary scan for comparison.    FINDINGS: There is prompt uptake of the injected radiotracer by the   hepatocytes. The gallbladder is first visualized at 55 minutes post   tracer injection and bowel activity by 35 minutes. There is normal tracer   clearance from the liver by the end of the study.     IMPRESSION: Normal hepatobiliary scan.     No scan evidence of acute cholecystitis.                     REE BRIDGES M.D., NUCLEAR MEDICINE ATTENDING  This document has been electronically signed. Wei  3 2019  6:42PM                < end of copied text >

## 2019-01-05 ENCOUNTER — TRANSCRIPTION ENCOUNTER (OUTPATIENT)
Age: 69
End: 2019-01-05

## 2019-01-05 LAB
-  AMIKACIN: SIGNIFICANT CHANGE UP
-  AMPICILLIN/SULBACTAM: SIGNIFICANT CHANGE UP
-  AMPICILLIN: SIGNIFICANT CHANGE UP
-  AZTREONAM: SIGNIFICANT CHANGE UP
-  CEFAZOLIN: SIGNIFICANT CHANGE UP
-  CEFEPIME: SIGNIFICANT CHANGE UP
-  CEFOXITIN: SIGNIFICANT CHANGE UP
-  CEFTRIAXONE: SIGNIFICANT CHANGE UP
-  CIPROFLOXACIN: SIGNIFICANT CHANGE UP
-  ERTAPENEM: SIGNIFICANT CHANGE UP
-  GENTAMICIN: SIGNIFICANT CHANGE UP
-  IMIPENEM: SIGNIFICANT CHANGE UP
-  LEVOFLOXACIN: SIGNIFICANT CHANGE UP
-  MEROPENEM: SIGNIFICANT CHANGE UP
-  PIPERACILLIN/TAZOBACTAM: SIGNIFICANT CHANGE UP
-  TOBRAMYCIN: SIGNIFICANT CHANGE UP
-  TRIMETHOPRIM/SULFAMETHOXAZOLE: SIGNIFICANT CHANGE UP
ALBUMIN SERPL ELPH-MCNC: 1.2 G/DL — LOW (ref 3.3–5)
ALP SERPL-CCNC: 380 U/L — HIGH (ref 40–120)
ALT FLD-CCNC: 39 U/L — SIGNIFICANT CHANGE UP (ref 12–78)
ANION GAP SERPL CALC-SCNC: 10 MMOL/L — SIGNIFICANT CHANGE UP (ref 5–17)
AST SERPL-CCNC: 60 U/L — HIGH (ref 15–37)
BILIRUB SERPL-MCNC: 0.8 MG/DL — SIGNIFICANT CHANGE UP (ref 0.2–1.2)
BUN SERPL-MCNC: 31 MG/DL — HIGH (ref 7–23)
CALCIUM SERPL-MCNC: 7.8 MG/DL — LOW (ref 8.5–10.1)
CHLORIDE SERPL-SCNC: 97 MMOL/L — SIGNIFICANT CHANGE UP (ref 96–108)
CO2 SERPL-SCNC: 28 MMOL/L — SIGNIFICANT CHANGE UP (ref 22–31)
CREAT SERPL-MCNC: 5.4 MG/DL — HIGH (ref 0.5–1.3)
CULTURE RESULTS: SIGNIFICANT CHANGE UP
CULTURE RESULTS: SIGNIFICANT CHANGE UP
GLUCOSE SERPL-MCNC: 151 MG/DL — HIGH (ref 70–99)
HBV CORE AB SER-ACNC: SIGNIFICANT CHANGE UP
HBV SURFACE AB SER-ACNC: <3 MIU/ML — LOW
HBV SURFACE AG SER-ACNC: SIGNIFICANT CHANGE UP
HCT VFR BLD CALC: 35.2 % — SIGNIFICANT CHANGE UP (ref 34.5–45)
HGB BLD-MCNC: 10.9 G/DL — LOW (ref 11.5–15.5)
MCHC RBC-ENTMCNC: 28.8 PG — SIGNIFICANT CHANGE UP (ref 27–34)
MCHC RBC-ENTMCNC: 31 GM/DL — LOW (ref 32–36)
MCV RBC AUTO: 92.9 FL — SIGNIFICANT CHANGE UP (ref 80–100)
METHOD TYPE: SIGNIFICANT CHANGE UP
NRBC # BLD: 0 /100 WBCS — SIGNIFICANT CHANGE UP (ref 0–0)
ORGANISM # SPEC MICROSCOPIC CNT: SIGNIFICANT CHANGE UP
PLATELET # BLD AUTO: 224 K/UL — SIGNIFICANT CHANGE UP (ref 150–400)
POTASSIUM SERPL-MCNC: 4.2 MMOL/L — SIGNIFICANT CHANGE UP (ref 3.5–5.3)
POTASSIUM SERPL-SCNC: 4.2 MMOL/L — SIGNIFICANT CHANGE UP (ref 3.5–5.3)
PROT SERPL-MCNC: 6.4 G/DL — SIGNIFICANT CHANGE UP (ref 6–8.3)
RBC # BLD: 3.79 M/UL — LOW (ref 3.8–5.2)
RBC # FLD: 23.3 % — HIGH (ref 10.3–14.5)
SODIUM SERPL-SCNC: 135 MMOL/L — SIGNIFICANT CHANGE UP (ref 135–145)
SPECIMEN SOURCE: SIGNIFICANT CHANGE UP
SPECIMEN SOURCE: SIGNIFICANT CHANGE UP
WBC # BLD: 14.66 K/UL — HIGH (ref 3.8–10.5)
WBC # FLD AUTO: 14.66 K/UL — HIGH (ref 3.8–10.5)

## 2019-01-05 PROCEDURE — 99233 SBSQ HOSP IP/OBS HIGH 50: CPT

## 2019-01-05 PROCEDURE — 99232 SBSQ HOSP IP/OBS MODERATE 35: CPT

## 2019-01-05 RX ORDER — ONDANSETRON 8 MG/1
4 TABLET, FILM COATED ORAL ONCE
Qty: 0 | Refills: 0 | Status: COMPLETED | OUTPATIENT
Start: 2019-01-05 | End: 2019-01-05

## 2019-01-05 RX ADMIN — HEPARIN SODIUM 5000 UNIT(S): 5000 INJECTION INTRAVENOUS; SUBCUTANEOUS at 14:09

## 2019-01-05 RX ADMIN — ATORVASTATIN CALCIUM 80 MILLIGRAM(S): 80 TABLET, FILM COATED ORAL at 22:49

## 2019-01-05 RX ADMIN — HEPARIN SODIUM 5000 UNIT(S): 5000 INJECTION INTRAVENOUS; SUBCUTANEOUS at 22:48

## 2019-01-05 RX ADMIN — ONDANSETRON 4 MILLIGRAM(S): 8 TABLET, FILM COATED ORAL at 01:39

## 2019-01-05 RX ADMIN — PIPERACILLIN AND TAZOBACTAM 25 GRAM(S): 4; .5 INJECTION, POWDER, LYOPHILIZED, FOR SOLUTION INTRAVENOUS at 18:23

## 2019-01-05 RX ADMIN — PIPERACILLIN AND TAZOBACTAM 25 GRAM(S): 4; .5 INJECTION, POWDER, LYOPHILIZED, FOR SOLUTION INTRAVENOUS at 05:22

## 2019-01-05 RX ADMIN — ISOSORBIDE MONONITRATE 30 MILLIGRAM(S): 60 TABLET, EXTENDED RELEASE ORAL at 22:49

## 2019-01-05 RX ADMIN — ISOSORBIDE MONONITRATE 60 MILLIGRAM(S): 60 TABLET, EXTENDED RELEASE ORAL at 14:09

## 2019-01-05 RX ADMIN — GABAPENTIN 300 MILLIGRAM(S): 400 CAPSULE ORAL at 05:22

## 2019-01-05 RX ADMIN — Medication 100 MILLIGRAM(S): at 14:09

## 2019-01-05 RX ADMIN — HEPARIN SODIUM 5000 UNIT(S): 5000 INJECTION INTRAVENOUS; SUBCUTANEOUS at 05:22

## 2019-01-05 RX ADMIN — Medication 81 MILLIGRAM(S): at 14:09

## 2019-01-05 RX ADMIN — SENNA PLUS 2 TABLET(S): 8.6 TABLET ORAL at 22:49

## 2019-01-05 NOTE — PROGRESS NOTE ADULT - SUBJECTIVE AND OBJECTIVE BOX
Patient is a 68y old  Female who presents with a chief complaint of abd pain, vomiting.      INTERVAL HPI/OVERNIGHT EVENTS: Pt reports abd pain still present though somewhat improved. No vomiting. No fever, chills, CP, SOB.     MEDICATIONS  (STANDING):  aspirin enteric coated 81 milliGRAM(s) Oral daily  atorvastatin 80 milliGRAM(s) Oral at bedtime  dextrose 5% + sodium chloride 0.45%. 1000 milliLiter(s) (60 mL/Hr) IV Continuous <Continuous>  dextrose 5%. 1000 milliLiter(s) (50 mL/Hr) IV Continuous <Continuous>  dextrose 50% Injectable 12.5 Gram(s) IV Push once  dextrose 50% Injectable 25 Gram(s) IV Push once  dextrose 50% Injectable 25 Gram(s) IV Push once  docusate sodium 100 milliGRAM(s) Oral daily  gabapentin 300 milliGRAM(s) Oral two times a day  heparin  Injectable 5000 Unit(s) SubCutaneous every 8 hours  insulin lispro (HumaLOG) corrective regimen sliding scale   SubCutaneous three times a day before meals  insulin lispro (HumaLOG) corrective regimen sliding scale   SubCutaneous at bedtime  isosorbide   mononitrate ER Tablet (IMDUR) 30 milliGRAM(s) Oral at bedtime  isosorbide   mononitrate ER Tablet (IMDUR) 60 milliGRAM(s) Oral daily  metoprolol tartrate 50 milliGRAM(s) Oral two times a day  NIFEdipine XL 60 milliGRAM(s) Oral daily  piperacillin/tazobactam IVPB. 3.375 Gram(s) IV Intermittent every 12 hours  senna 2 Tablet(s) Oral at bedtime  sodium chloride 0.9%. 1000 milliLiter(s) (50 mL/Hr) IV Continuous <Continuous>    MEDICATIONS  (PRN):  dextrose 40% Gel 15 Gram(s) Oral once PRN Blood Glucose LESS THAN 70 milliGRAM(s)/deciliter  glucagon  Injectable 1 milliGRAM(s) IntraMuscular once PRN Glucose LESS THAN 70 milligrams/deciliter  HYDROmorphone  Injectable 0.5 milliGRAM(s) IV Push every 6 hours PRN Moderate Pain (4 - 6)  HYDROmorphone  Injectable 1 milliGRAM(s) IV Push every 8 hours PRN Severe Pain (7 - 10)      Allergies    ertapenem (Urticaria)  Purell (Rash)    Intolerances        REVIEW OF SYSTEMS:  CONSTITUTIONAL: No fever or chills  RESPIRATORY: No cough or shortness of breath  CARDIOVASCULAR: No chest pain  GASTROINTESTINAL: +abd pain (somewhat improved), no vomiting or diarrhea  GENITOURINARY: No dysuria  NEUROLOGICAL: no focal weakness or dizziness    Vital Signs Last 24 Hrs  T(C): 37.6 (05 Jan 2019 19:58), Max: 37.6 (05 Jan 2019 19:58)  T(F): 99.6 (05 Jan 2019 19:58), Max: 99.6 (05 Jan 2019 19:58)  HR: 89 (05 Jan 2019 19:58) (77 - 89)  BP: 137/68 (05 Jan 2019 19:58) (101/63 - 170/72)  BP(mean): --  RR: 17 (05 Jan 2019 19:58) (16 - 19)  SpO2: 95% (05 Jan 2019 19:58) (91% - 100%)    PHYSICAL EXAM:  GENERAL: NAD at rest  HEENT:  anicteric, moist mucous membranes  CHEST/LUNG:  CTA b/l, no rales, wheezes, or rhonchi  HEART:  RRR, S1, S2  ABDOMEN:  BS+, soft, nontender, nondistended  EXTREMITIES: no edema, cyanosis, or calf tenderness  NERVOUS SYSTEM: AA&Ox3    LABS:    CBC Full  -  ( 05 Jan 2019 06:17 )  WBC Count : 14.66 K/uL  Hemoglobin : 10.9 g/dL  Hematocrit : 35.2 %  Platelet Count - Automated : 224 K/uL  Mean Cell Volume : 92.9 fl  Mean Cell Hemoglobin : 28.8 pg  Mean Cell Hemoglobin Concentration : 31.0 gm/dL  Auto Neutrophil # : x  Auto Lymphocyte # : x  Auto Monocyte # : x  Auto Eosinophil # : x  Auto Basophil # : x  Auto Neutrophil % : x  Auto Lymphocyte % : x  Auto Monocyte % : x  Auto Eosinophil % : x  Auto Basophil % : x      01-05    135  |  97  |  31<H>  ----------------------------<  151<H>  4.2   |  28  |  5.40<H>    Ca    7.8<L>      05 Jan 2019 06:17    TPro  6.4  /  Alb  1.2<L>  /  TBili  0.8  /  DBili  .60<H>  /  AST  60<H>  /  ALT  39  /  AlkPhos  380<H>           CAPILLARY BLOOD GLUCOSE      POCT Blood Glucose.: 79 mg/dL (05 Jan 2019 16:47)  POCT Blood Glucose.: 114 mg/dL (05 Jan 2019 11:32)  POCT Blood Glucose.: 143 mg/dL (05 Jan 2019 08:26)        Culture - Blood (collected 01-04-19 @ 07:48)  Source: .Blood Blood-Venous  Preliminary Report (01-05-19 @ 08:01):    No growth to date.    Culture - Blood (collected 01-04-19 @ 07:48)  Source: .Blood Blood-Peripheral  Preliminary Report (01-05-19 @ 08:01):    No growth to date.    Culture - Blood (collected 01-03-19 @ 18:25)  Source: .Blood Blood-Peripheral  Preliminary Report (01-04-19 @ 19:01):    No growth to date.    Culture - Blood (collected 01-03-19 @ 18:25)  Source: .Blood Blood-Peripheral  Preliminary Report (01-04-19 @ 19:01):    No growth to date.    Culture - Blood (collected 01-02-19 @ 18:33)  Source: .Blood Blood-Peripheral  Gram Stain (01-03-19 @ 11:00):    Growth in aerobic bottle: Gram Negative Rods  Final Report (01-05-19 @ 12:16):    Growth in aerobic bottle: Klebsiella pneumoniae    See previous culture [id number]    Culture - Blood (collected 01-02-19 @ 18:32)  Source: .Blood Blood-Peripheral  Gram Stain (01-03-19 @ 15:40):    Growth in aerobic and anaerobic bottles: Gram Negative Rods  Final Report (01-05-19 @ 11:36):    Growth in aerobic and anaerobic bottles: Klebsiella pneumoniae    "Due to technical problems, Proteus sp. will Not be reported as part of    the BCID panel until further notice"    ***Blood Panel PCR results on this specimen are available    approximately 3 hours after the Gram stain result.***    Gram stain, PCR, and/or culture results may not always    correspond due to difference in methodologies.    ************************************************************    This PCR assay was performed using [employer].    The following targets are tested for: Enterococcus,    vancomycin resistant enterococci, Listeria monocytogenes,    coagulase negative staphylococci, S. aureus,    methicillin resistant S. aureus, Streptococcus agalactiae    (Group B), S. pneumoniae, S. pyogenes (Group A),    Acinetobacter baumannii, Enterobacter cloacae, E. coli,    Klebsiella oxytoca, K. pneumoniae, Proteus sp.,    Serratia marcescens, Haemophilus influenzae,    Neisseria meningitidis, Pseudomonas aeruginosa, Candida    albicans, C. glabrata, C krusei, C parapsilosis,    C. tropicalis and the KPC resistance gene.  Organism: Blood Culture PCR  Klebsiella pneumoniae (01-05-19 @ 11:36)  Organism: Klebsiella pneumoniae (01-05-19 @ 11:36)      -  Amikacin: S <=8      -  Ampicillin: R >16 These ampicillin results predict results for amoxicillin      -  Ampicillin/Sulbactam: R >16/8      -  Aztreonam: S <=4      -  Cefazolin: R 16      -  Cefepime: S <=2      -  Cefoxitin: S <=4      -  Ceftriaxone: S <=1 Enterobacter, Citrobacter, and Serratia may develop resistance during prolonged therapy      -  Ciprofloxacin: S <=0.5      -  Ertapenem: S <=0.5      -  Gentamicin: S <=1      -  Imipenem: S <=1      -  Levofloxacin: S <=1      -  Meropenem: S <=1      -  Piperacillin/Tazobactam: I 32      -  Tobramycin: S <=2      -  Trimethoprim/Sulfamethoxazole: S <=0.5/9.5      Method Type: KOREY  Organism: Blood Culture PCR (01-05-19 @ 11:36)      -  Klebsiella pneumoniae: Detec      Method Type: PCR        RADIOLOGY & ADDITIONAL TESTS:    Personally reviewed.     Consultant(s) Notes Reviewed:  [x] YES  [ ] NO Patient is a 68y old  Female who presents with a chief complaint of abd pain, vomiting.    INTERVAL HPI/OVERNIGHT EVENTS: Pt reports abd pain still present though somewhat improved. No vomiting. No fever, chills, CP, SOB.     MEDICATIONS  (STANDING):  aspirin enteric coated 81 milliGRAM(s) Oral daily  atorvastatin 80 milliGRAM(s) Oral at bedtime  dextrose 5% + sodium chloride 0.45%. 1000 milliLiter(s) (60 mL/Hr) IV Continuous <Continuous>  dextrose 5%. 1000 milliLiter(s) (50 mL/Hr) IV Continuous <Continuous>  dextrose 50% Injectable 12.5 Gram(s) IV Push once  dextrose 50% Injectable 25 Gram(s) IV Push once  dextrose 50% Injectable 25 Gram(s) IV Push once  docusate sodium 100 milliGRAM(s) Oral daily  gabapentin 300 milliGRAM(s) Oral two times a day  heparin  Injectable 5000 Unit(s) SubCutaneous every 8 hours  insulin lispro (HumaLOG) corrective regimen sliding scale   SubCutaneous three times a day before meals  insulin lispro (HumaLOG) corrective regimen sliding scale   SubCutaneous at bedtime  isosorbide   mononitrate ER Tablet (IMDUR) 30 milliGRAM(s) Oral at bedtime  isosorbide   mononitrate ER Tablet (IMDUR) 60 milliGRAM(s) Oral daily  metoprolol tartrate 50 milliGRAM(s) Oral two times a day  NIFEdipine XL 60 milliGRAM(s) Oral daily  piperacillin/tazobactam IVPB. 3.375 Gram(s) IV Intermittent every 12 hours  senna 2 Tablet(s) Oral at bedtime  sodium chloride 0.9%. 1000 milliLiter(s) (50 mL/Hr) IV Continuous <Continuous>    MEDICATIONS  (PRN):  dextrose 40% Gel 15 Gram(s) Oral once PRN Blood Glucose LESS THAN 70 milliGRAM(s)/deciliter  glucagon  Injectable 1 milliGRAM(s) IntraMuscular once PRN Glucose LESS THAN 70 milligrams/deciliter  HYDROmorphone  Injectable 0.5 milliGRAM(s) IV Push every 6 hours PRN Moderate Pain (4 - 6)  HYDROmorphone  Injectable 1 milliGRAM(s) IV Push every 8 hours PRN Severe Pain (7 - 10)      Allergies    ertapenem (Urticaria)  Purell (Rash)    Intolerances        REVIEW OF SYSTEMS:  CONSTITUTIONAL: No fever or chills  RESPIRATORY: No cough or shortness of breath  CARDIOVASCULAR: No chest pain  GASTROINTESTINAL: +abd pain (somewhat improved), no vomiting or diarrhea  GENITOURINARY: No dysuria  NEUROLOGICAL: no focal weakness or dizziness    Vital Signs Last 24 Hrs  T(C): 37.6 (05 Jan 2019 19:58), Max: 37.6 (05 Jan 2019 19:58)  T(F): 99.6 (05 Jan 2019 19:58), Max: 99.6 (05 Jan 2019 19:58)  HR: 89 (05 Jan 2019 19:58) (77 - 89)  BP: 137/68 (05 Jan 2019 19:58) (101/63 - 170/72)  BP(mean): --  RR: 17 (05 Jan 2019 19:58) (16 - 19)  SpO2: 95% (05 Jan 2019 19:58) (91% - 100%)    PHYSICAL EXAM:  GENERAL: NAD at rest  HEENT:  anicteric, moist mucous membranes  CHEST/LUNG:  CTA b/l, no rales, wheezes, or rhonchi  HEART:  RRR, S1, S2  ABDOMEN:  BS+, soft, nontender, nondistended  EXTREMITIES: no edema, cyanosis, or calf tenderness  NERVOUS SYSTEM: AA&Ox3    LABS:    CBC Full  -  ( 05 Jan 2019 06:17 )  WBC Count : 14.66 K/uL  Hemoglobin : 10.9 g/dL  Hematocrit : 35.2 %  Platelet Count - Automated : 224 K/uL  Mean Cell Volume : 92.9 fl  Mean Cell Hemoglobin : 28.8 pg  Mean Cell Hemoglobin Concentration : 31.0 gm/dL  Auto Neutrophil # : x  Auto Lymphocyte # : x  Auto Monocyte # : x  Auto Eosinophil # : x  Auto Basophil # : x  Auto Neutrophil % : x  Auto Lymphocyte % : x  Auto Monocyte % : x  Auto Eosinophil % : x  Auto Basophil % : x      01-05    135  |  97  |  31<H>  ----------------------------<  151<H>  4.2   |  28  |  5.40<H>    Ca    7.8<L>      05 Jan 2019 06:17    TPro  6.4  /  Alb  1.2<L>  /  TBili  0.8  /  DBili  .60<H>  /  AST  60<H>  /  ALT  39  /  AlkPhos  380<H>           CAPILLARY BLOOD GLUCOSE      POCT Blood Glucose.: 79 mg/dL (05 Jan 2019 16:47)  POCT Blood Glucose.: 114 mg/dL (05 Jan 2019 11:32)  POCT Blood Glucose.: 143 mg/dL (05 Jan 2019 08:26)        Culture - Blood (collected 01-04-19 @ 07:48)  Source: .Blood Blood-Venous  Preliminary Report (01-05-19 @ 08:01):    No growth to date.    Culture - Blood (collected 01-04-19 @ 07:48)  Source: .Blood Blood-Peripheral  Preliminary Report (01-05-19 @ 08:01):    No growth to date.    Culture - Blood (collected 01-03-19 @ 18:25)  Source: .Blood Blood-Peripheral  Preliminary Report (01-04-19 @ 19:01):    No growth to date.    Culture - Blood (collected 01-03-19 @ 18:25)  Source: .Blood Blood-Peripheral  Preliminary Report (01-04-19 @ 19:01):    No growth to date.    Culture - Blood (collected 01-02-19 @ 18:33)  Source: .Blood Blood-Peripheral  Gram Stain (01-03-19 @ 11:00):    Growth in aerobic bottle: Gram Negative Rods  Final Report (01-05-19 @ 12:16):    Growth in aerobic bottle: Klebsiella pneumoniae    See previous culture [id number]    Culture - Blood (collected 01-02-19 @ 18:32)  Source: .Blood Blood-Peripheral  Gram Stain (01-03-19 @ 15:40):    Growth in aerobic and anaerobic bottles: Gram Negative Rods  Final Report (01-05-19 @ 11:36):    Growth in aerobic and anaerobic bottles: Klebsiella pneumoniae    "Due to technical problems, Proteus sp. will Not be reported as part of    the BCID panel until further notice"    ***Blood Panel PCR results on this specimen are available    approximately 3 hours after the Gram stain result.***    Gram stain, PCR, and/or culture results may not always    correspond due to difference in methodologies.    ************************************************************    This PCR assay was performed using [employer] [facility].    The following targets are tested for: Enterococcus,    vancomycin resistant enterococci, Listeria monocytogenes,    coagulase negative staphylococci, S. aureus,    methicillin resistant S. aureus, Streptococcus agalactiae    (Group B), S. pneumoniae, S. pyogenes (Group A),    Acinetobacter baumannii, Enterobacter cloacae, E. coli,    Klebsiella oxytoca, K. pneumoniae, Proteus sp.,    Serratia marcescens, Haemophilus influenzae,    Neisseria meningitidis, Pseudomonas aeruginosa, Candida    albicans, C. glabrata, C krusei, C parapsilosis,    C. tropicalis and the KPC resistance gene.  Organism: Blood Culture PCR  Klebsiella pneumoniae (01-05-19 @ 11:36)  Organism: Klebsiella pneumoniae (01-05-19 @ 11:36)      -  Amikacin: S <=8      -  Ampicillin: R >16 These ampicillin results predict results for amoxicillin      -  Ampicillin/Sulbactam: R >16/8      -  Aztreonam: S <=4      -  Cefazolin: R 16      -  Cefepime: S <=2      -  Cefoxitin: S <=4      -  Ceftriaxone: S <=1 Enterobacter, Citrobacter, and Serratia may develop resistance during prolonged therapy      -  Ciprofloxacin: S <=0.5      -  Ertapenem: S <=0.5      -  Gentamicin: S <=1      -  Imipenem: S <=1      -  Levofloxacin: S <=1      -  Meropenem: S <=1      -  Piperacillin/Tazobactam: I 32      -  Tobramycin: S <=2      -  Trimethoprim/Sulfamethoxazole: S <=0.5/9.5      Method Type: KOREY  Organism: Blood Culture PCR (01-05-19 @ 11:36)      -  Klebsiella pneumoniae: Detec      Method Type: PCR        RADIOLOGY & ADDITIONAL TESTS:    Personally reviewed.     Consultant(s) Notes Reviewed:  [x] YES  [ ] NO

## 2019-01-05 NOTE — DIETITIAN INITIAL EVALUATION ADULT. - PROBLEM SELECTOR PLAN 2
- ?Acute on chronic  - c/w abx pending ID recs  - f/u HIDA scan  - NPO   - Gen Surg (Andres) consulted  - Pt is s/p biliary stent  - Consult GI (Lea)

## 2019-01-05 NOTE — PROGRESS NOTE ADULT - SUBJECTIVE AND OBJECTIVE BOX
[facility] -- Glynn Bullock, Claudia Davis Pannella, Patel, Savella  Office # [phone]      Follow Up:    elevated troponin  Subjective/Observations:   No events overnight resting comfortably in bed.  No complaints of chest pain, dyspnea, or palpitations reported. No signs of orthopnea or PND.       REVIEW OF SYSTEMS: All other review of systems is negative unless indicated above    PAST MEDICAL & SURGICAL HISTORY:  Cholecystitis with cholangitis  Acute urinary retention  Liver abscess  ESRD (end stage renal disease) on dialysis: MWF  CAD (coronary artery disease): s/p stent in 2007  Diabetes  Myocardial infarction  Hypertension  H/O: hysterectomy      MEDICATIONS  (STANDING):  aspirin enteric coated 81 milliGRAM(s) Oral daily  atorvastatin 80 milliGRAM(s) Oral at bedtime  dextrose 5% + sodium chloride 0.45%. 1000 milliLiter(s) (60 mL/Hr) IV Continuous <Continuous>  dextrose 5%. 1000 milliLiter(s) (50 mL/Hr) IV Continuous <Continuous>  dextrose 50% Injectable 12.5 Gram(s) IV Push once  dextrose 50% Injectable 25 Gram(s) IV Push once  dextrose 50% Injectable 25 Gram(s) IV Push once  docusate sodium 100 milliGRAM(s) Oral daily  gabapentin 300 milliGRAM(s) Oral two times a day  heparin  Injectable 5000 Unit(s) SubCutaneous every 8 hours  insulin lispro (HumaLOG) corrective regimen sliding scale   SubCutaneous three times a day before meals  insulin lispro (HumaLOG) corrective regimen sliding scale   SubCutaneous at bedtime  isosorbide   mononitrate ER Tablet (IMDUR) 30 milliGRAM(s) Oral at bedtime  isosorbide   mononitrate ER Tablet (IMDUR) 60 milliGRAM(s) Oral daily  metoprolol tartrate 50 milliGRAM(s) Oral two times a day  NIFEdipine XL 60 milliGRAM(s) Oral daily  piperacillin/tazobactam IVPB. 3.375 Gram(s) IV Intermittent every 12 hours  senna 2 Tablet(s) Oral at bedtime  sodium chloride 0.9%. 1000 milliLiter(s) (50 mL/Hr) IV Continuous <Continuous>    MEDICATIONS  (PRN):  dextrose 40% Gel 15 Gram(s) Oral once PRN Blood Glucose LESS THAN 70 milliGRAM(s)/deciliter  glucagon  Injectable 1 milliGRAM(s) IntraMuscular once PRN Glucose LESS THAN 70 milligrams/deciliter  HYDROmorphone  Injectable 0.5 milliGRAM(s) IV Push every 6 hours PRN Moderate Pain (4 - 6)  HYDROmorphone  Injectable 1 milliGRAM(s) IV Push every 8 hours PRN Severe Pain (7 - 10)      Allergies    ertapenem (Urticaria)  Purell (Rash)    Intolerances        Vital Signs Last 24 Hrs  T(C): 36.7 (05 Jan 2019 08:35), Max: 37.3 (05 Jan 2019 04:35)  T(F): 98 (05 Jan 2019 08:35), Max: 99.2 (05 Jan 2019 04:35)  HR: 80 (05 Jan 2019 08:35) (77 - 80)  BP: 170/72 (05 Jan 2019 08:35) (101/63 - 170/72)  BP(mean): --  RR: 18 (05 Jan 2019 08:35) (17 - 19)  SpO2: 97% (05 Jan 2019 08:35) (91% - 97%)    I&O's Summary        PHYSICAL EXAM:  TELE: NSR @ 72  Constitutional: NAD, awake and alert, well-developed  HEENT: Moist Mucous Membranes, Anicteric  Pulmonary: Non-labored, breath sounds are clear bilaterally, No wheezing, crackles or rhonchi  Cardiovascular: Regular, S1 and S2 nl, No murmurs, rubs, gallops or clicks  Gastrointestinal: Bowel Sounds present, soft, nontender.   Lymph: No lymphadenopathy. No peripheral edema.  Skin: No visible rashes or ulcers.  Psych:  Mood & affect appropriate    LABS: All Labs Reviewed:                        10.9   14.66 )-----------( 224      ( 05 Jan 2019 06:17 )             35.2                         10.0   12.71 )-----------( 173      ( 04 Jan 2019 06:43 )             31.4                         11.5   14.34 )-----------( 189      ( 03 Jan 2019 05:43 )             36.3     05 Jan 2019 06:17    135    |  97     |  31     ----------------------------<  151    4.2     |  28     |  5.40   04 Jan 2019 06:43    136    |  100    |  22     ----------------------------<  125    3.9     |  29     |  3.90   03 Jan 2019 05:43    135    |  99     |  52     ----------------------------<  140    4.4     |  27     |  6.20     Ca    7.8        05 Jan 2019 06:17  Ca    7.2        04 Jan 2019 06:43  Ca    7.7        03 Jan 2019 05:43    TPro  6.4    /  Alb  1.2    /  TBili  0.8    /  DBili  .60    /  AST  60     /  ALT  39     /  AlkPhos  380    05 Jan 2019 06:17  TPro  6.0    /  Alb  1.2    /  TBili  0.8    /  DBili  x      /  AST  81     /  ALT  39     /  AlkPhos  267    03 Jan 2019 05:43  TPro  6.4    /  Alb  1.3    /  TBili  0.6    /  DBili  x      /  AST  54     /  ALT  33     /  AlkPhos  353    02 Jan 2019 13:33        ECG:  < from: 12 Lead ECG (01.02.19 @ 16:10) >  Ventricular Rate 69 BPM    Atrial Rate 69 BPM    P-R Interval 176 ms    QRS Duration 154 ms    Q-T Interval 468 ms    QTC Calculation(Bezet) 501 ms    P Axis 41 degrees    R Axis 101 degrees    T Axis -10 degrees    Diagnosis Line Normal sinus rhythm  Right bundle branch block  Inferior infarct (cited on or before 31-OCT-2018)  Abnormal ECG    Confirmed by Jovanna Hunter ([id number]) on 1/3/2019 5:19:10 PM    < end of copied text >    Echo:  < from: TTE Echo Doppler w/o Cont (05.04.18 @ 20:56) >  EXAM:  ECHO TTE W/O CON COMP W/DOPPLR         PROCEDURE DATE:  05/04/2018        INTERPRETATION:  Height 165cm. weight 95kg. blood pressure 129/81.   Technician TE. Indication for study dyspnea.    Aortic root 2.3 cm left atrium 4.4 cm left ventricular end-diastolic   diameter 5.7 cm left ventricular end-systolic diameter 4.3 cm septal wall   thickness 1.2 cm posterior wall thickness 1.2 cm visually equivocally   estimated ejection fraction 50-55%.    The aortic root is calcified and of normaldiameter. 3 distinct leaflets   of the aortic valve are not well demonstrated by this study. The   technician was unable to identify any significant gradient across this   valve to suggest hemodynamically significant aortic stenosis. Left atrium   isenlarged. The left ventricle was difficult to visualize by all   standard echocardiographic windows. Possibly the end-diastolic diameter   is mildly increased. The wall thickness is borderline increased. Any   significant focal wall motion abnormality cannot be ascertained by this   study. Visually estimated ejection fraction 50-55%. The mitral annulus is   calcified. The mitral valve leaflets are mildly thickened with a normal   EF slope and excursion. There is no evidence for hemodynamically   significant mitral stenosis. On the very limited color flow Doppler   portion examination mild mitral regurgitation suggested.    Conclusion:  1. Technically difficult limited supine study. Please note that this is a   portable study and the study is also limited due to patient's body   habitus.  2. 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  3. Enlarged left atrium with associated mild mitral regurgitation.  4. Very limited visualization left ventricle which may represent mild   left ventricular concentric hypertrophy with a well-preserved ejection   fraction as described above.  5. A very small posterior pericardial effusion is suggested but not   diagnostic.  6. Correlate these limited findings clinically.                    SALVADOR PHILLIPS M.D., ATTENDING CARDIOLOGIST  This document has been electronically signed. May  5 2018  9:47AM    < end of copied text >    Radiology:  < from: CT Chest w/ IV Cont (01.02.19 @ 15:04) >    EXAM:  CT ABDOMEN AND PELVIS IC                          EXAM:  CT CHEST IC                            PROCEDURE DATE:  01/02/2019          INTERPRETATION:  .    CLINICAL INFORMATION: Lower chest pain.    TECHNIQUE: Helical axial images were obtained from the thoracic inlet to   the pubic symphysis. 95 mls of Omnipaque-350 was administered   intravenously without complication and 5 mls were discarded. Oral   contrast was not administered. Sagittal and coronal reconstructed images   were obtained from the source data.    COMPARISON: Most recent prior CT examination of the abdomen and pelvis   from 11/26/2018. Prior MRI examination of the abdomen from 11/27/2018.   Prior chest, abdomen, and pelvis CT examination from 10/29/2018.    FINDINGS: Scattered subcentimeter sized lymph nodes are noted within the   axillary regions, mediastinum, and hilar areas.    The heart size is enlarged. The pericardium appears unremarkable. There   is a right sided dialysis catheter with its tip at the right atrium. A   left-sided IJ central line is noted with the tip at the SVC. No filling   defects are notable within the pulmonary arterial vessels. There are   atherosclerotic calcifications of the imaged coronary arteries, aorta,   and branch vessels. The imaged portions of the aorta are normal in   caliber.    The central airways are patent. Patchy groundglass opacities are notable   throughout both lungs with resultant mosaic attenuation. Scattered areas   of linear atelectasis are notable within the bilateral lung bases, left   upper lobe, and lingula.    There is redemonstration of nonspecific gallbladder wall thickening   and/or edema and/or fat deposition. Sludge and/or stones are noted within   the lumen of the gallbladder. A cholecystostomy tube tract is notable   along the right upper anterior abdominal wall. A small amount of   pneumobilia is noted, compatible with stent patency. The common bile duct   remains dilated and measures up to 1.2 cm. A common bile duct stent is   again noted. No radiopaque stones are notable adjacent to the stent.   Previously noted abscesses within the liver appear less conspicuous   compared to the prior MRI and CT examinations. A small low-attenuation   focus is noted within the central hepatic area measuring up to 8 mm   (series 2, image 114).     The pancreas, spleen, and adrenal glands appear unremarkable.    There is atrophy of the bilateral kidneys. A left-sided renal cyst   appears unchanged. There is minimal nonspecific bilateral perinephric   thickening. There is no hydronephrosis bilaterally. Arterial vascular   calcifications are noted in the vicinity of the bilateral renal   collecting systems. The urinary bladder appears unremarkable.    There are multiple scattered nonspecificsubcentimeter retroperitoneal   and mesenteric lymph nodes.    There is no bowel obstruction or abdominal ascites. A duodenal   diverticulum appears unchanged. Gas and stool are notable throughout the   large bowel loops. The appendix is normal.    The patient is status post hysterectomy. No adnexal masses are noted.    Multilevel degenerative changes notable within the imaged spine. There is   a moderate compression deformity of the L2 vertebral body which appears   unchanged. There is also a mild superior endplate deformity of the T12   vertebral body which also appears unchanged.    Areas of mixed lucency and sclerosis adjacent to the bilateral SI joints   appear unchanged. Mild diffuse osteosclerosis may be compatible with   early renal osteodystrophy.    IMPRESSION:    CT chest:  1. Groundglass mosaic attenuation to the lungs which may reflect small   airways or small vessel disease.    CT abdomen and pelvis:  1. Chronic cholecystitis, as seen on prior exams. A superimposed acute   component cannot be completely excluded.  2. Redemonstration of a common bile duct stent with pneumobilia,   compatible with stent patency.  3. Interval decreased conspicuity of previous noted abscesses throughout   the liver with a small abscess remaining, as discussed.  4. Other chronic nonemergent findings, as discussed.                CARLITO BEE M.D., ATTENDING RADIOLOGIST  This document has been electronically signed. Jan 2 2019  3:30PM    < end of copied text >  < from: Xray Abdomen 2 Views (01.02.19 @ 20:02) >  EXAM:  XR ABDOMEN 2V                            PROCEDURE DATE:  01/02/2019          INTERPRETATION:  Abdominal pain, rule out perforation.    AP spine upright. Prior 11/15/2018.    Nonspecific nonobstructive bowel gas pattern. No free air. No opaque   calculi. Vascular calcification. Biliary stent projects over the   midabdomen.    Impression: No obstruction or free air.                QUIN POTTS M.D., ATTENDING RADIOLOGIST  This document has been electronically signed. Wei  3 2019  8:38AM          < end of copied text >           [facility]   Cardiology

## 2019-01-05 NOTE — PROGRESS NOTE ADULT - PROBLEM SELECTOR PLAN 1
acute on chronic  hida noted, neg for acute kevin  bld cxs growing gnr  currently on ivf/clears  cont abx per id  further plans as per surgery  trend lfts  cbd stent still in place, will need eventual removal  will follow  surgery on case to see whren oportune time is to operate abnormal/expand...

## 2019-01-05 NOTE — PROGRESS NOTE ADULT - SUBJECTIVE AND OBJECTIVE BOX
pt seen  c/o some abdominal pain  vomiting overnight  ICU Vital Signs Last 24 Hrs  T(C): 36.7 (05 Jan 2019 08:35), Max: 37.3 (05 Jan 2019 04:35)  T(F): 98 (05 Jan 2019 08:35), Max: 99.2 (05 Jan 2019 04:35)  HR: 80 (05 Jan 2019 08:35) (77 - 80)  BP: 170/72 (05 Jan 2019 08:35) (101/63 - 170/72)  BP(mean): --  ABP: --  ABP(mean): --  RR: 18 (05 Jan 2019 08:35) (17 - 19)  SpO2: 97% (05 Jan 2019 08:35) (91% - 97%)  gen-NAD  resp-clear  abd-epigastric/ruq tenderness                          10.9   14.66 )-----------( 224      ( 05 Jan 2019 06:17 )             35.2   01-05    135  |  97  |  31<H>  ----------------------------<  151<H>  4.2   |  28  |  5.40<H>    Ca    7.8<L>      05 Jan 2019 06:17    TPro  6.4  /  Alb  1.2<L>  /  TBili  0.8  /  DBili  .60<H>  /  AST  60<H>  /  ALT  39  /  AlkPhos  380<H>  01-05

## 2019-01-05 NOTE — PROGRESS NOTE ADULT - PROBLEM SELECTOR PLAN 3
Pt has PICC in place and course of Invanz/Tigecycline as outpt  -seems to be getting smaller on CT  - f/u ID recs (Dr. Solis).

## 2019-01-05 NOTE — PROGRESS NOTE ADULT - SUBJECTIVE AND OBJECTIVE BOX
INTERVAL HPI/OVERNIGHT EVENTS:  No new overnight event.  No N/V/D. ruq pain is present    Allergies    ertapenem (Urticaria)  Purell (Rash)    Intolerances  General:  No wt loss, fevers, chills, night sweats, fatigue,   Eyes:  Good vision, no reported pain  ENT:  No sore throat, pain, runny nose, dysphagia  CV:  No pain, palpitations, hypo/hypertension  Resp:  No dyspnea, cough, tachypnea, wheezing  GI:  No pain, No nausea, No vomiting, No diarrhea, No constipation, No weight loss, No fever, No pruritis, No rectal bleeding, No tarry stools, No dysphagia,  :  No pain, bleeding, incontinence, nocturia  Muscle:  No pain, weakness  Neuro:  No weakness, tingling, memory problems  Psych:  No fatigue, insomnia, mood problems, depression  Endocrine:  No polyuria, polydipsia, cold/heat intolerance  Heme:  No petechiae, ecchymosis, easy bruisability  Skin:  No rash, tattoos, scars, edema      PHYSICAL EXAM:   Vital Signs:  Vital Signs Last 24 Hrs  T(C): 37.6 (2019 19:58), Max: 37.6 (2019 19:58)  T(F): 99.6 (2019 19:58), Max: 99.6 (2019 19:58)  HR: 89 (2019 19:58) (77 - 89)  BP: 137/68 (2019 19:58) (101/63 - 170/72)  BP(mean): --  RR: 17 (2019 19:58) (16 - 19)  SpO2: 95% (2019 19:58) (91% - 100%)  Daily     Daily Weight in k.6 (2019 13:40)I&O's Summary    2019 07:01  -  2019 20:40  --------------------------------------------------------  IN: 340 mL / OUT: 1800 mL / NET: -1460 mL        GENERAL:  Appears stated age, well-groomed, well-nourished, no distress  HEENT:  NC/AT,  conjunctivae clear and pink, no thyromegaly, nodules, adenopathy, no JVD, sclera -anicteric  CHEST:  Full & symmetric excursion, no increased effort, breath sounds clear  HEART:  Regular rhythm, S1, S2, no murmur/rub/S3/S4, no abdominal bruit, no edema  ABDOMEN:  Soft, non-tender, non-distended, normoactive bowel sounds,  no masses ,no hepato-splenomegaly, no signs of chronic liver disease  EXTEREMITIES:  no cyanosis,clubbing or edema  SKIN:  No rash/erythema/ecchymoses/petechiae/wounds/abscess/warm/dry  NEURO:  Alert, oriented, no asterixis, no tremor, no encephalopathy      LABS:                        10.9   14.66 )-----------( 224      ( 2019 06:17 )             35.2         135  |  97  |  31<H>  ----------------------------<  151<H>  4.2   |  28  |  5.40<H>    Ca    7.8<L>      2019 06:17    TPro  6.4  /  Alb  1.2<L>  /  TBili  0.8  /  DBili  .60<H>  /  AST  60<H>  /  ALT  39  /  AlkPhos  380<H>          amylase   lipaseLipase, Serum: 127 U/L ( @ 13:33)    RADIOLOGY & ADDITIONAL TESTS:

## 2019-01-05 NOTE — DIETITIAN INITIAL EVALUATION ADULT. - ENERGY NEEDS
No pressure injuries noted. Awaiting lap choley, hx cholecystitis, liver abcess with biliary stent. Seen by SLP, rec soft regular diet, but Vomiting today on clear liquids.

## 2019-01-05 NOTE — CHART NOTE - NSCHARTNOTEFT_GEN_A_CORE
Called by RN for epsidoe of vomiting. Pt is a 67 yo F with PMH of ESRD on HD (M, W, F), CAD s/p stents 2007, DM, HTN, MI, biliary stent, Liver abscess who presents to the ED with abdominal pain x2 days, 4 episodes of vomiting. Admitted for sepsis 2/2 acute on chronic cholecystitis vs. failed treatment of liver abscess. Patient had 2 episodes of vomiting non-bloody brown/ bile like consistencey. Patient feels nauseous and admits mild abd pain. Denies f/c, cp, palpitation    T(C): 37.2 (01-05-19 @ 05:19), Max: 37.3 (01-05-19 @ 04:35)  HR: 77 (01-05-19 @ 04:35) (77 - 80)  BP: 101/63 (01-05-19 @ 04:35) (101/63 - 129/67)  RR: 19 (01-05-19 @ 04:35) (17 - 19)  SpO2: 96% (01-05-19 @ 04:35) (91% - 96%)  Wt(kg): --    General: NAD  Neurology: A&Ox3, nonfocal  Respiratory: Scattered expiratory wheezing bilaterally  CV: RRR, S1S2  Abdominal: mildly ttp of RUQ, + distal bsx4  Extremities: No edema, + peripheral pulses    LABS:                        10.0   12.71 )-----------( 173      ( 04 Jan 2019 06:43 )             31.4     01-04    136  |  100  |  22  ----------------------------<  125<H>  3.9   |  29  |  3.90<H>    Ca    7.2<L>      04 Jan 2019 06:43    TPro  6.0  /  Alb  1.2<L>  /  TBili  0.8  /  DBili  x   /  AST  81<H>  /  ALT  39  /  AlkPhos  267<H>  01-03          CARDIAC MARKERS ( 03 Jan 2019 05:43 )  .041 ng/mL / x     / 83 U/L / x     / 1.6 ng/mL      Called by RN for epsidoe of vomiting. Pt is a 67 yo F with PMH of ESRD on HD (M, W, F), CAD s/p stents 2007, DM, HTN, MI, biliary stent, Liver abscess who presents to the ED with abdominal pain x2 days, 4 episodes of vomiting. Admitted for sepsis 2/2 acute on chronic cholecystitis vs. failed treatment of liver abscess. Patient had 2 episodes of vomiting non-bloody brown/ bile like consistencey.    - Vitals stable  - Ordered Zofran 4mg x1  - Continue IV Zosyn   - Patient is being followed by GI   - Further management with primary team in AM

## 2019-01-05 NOTE — PROGRESS NOTE ADULT - PROBLEM SELECTOR PLAN 1
Pt met sepsis criteria (fever, leukocytosis, source) on admission, suspect 2/2 acute on chronic cholecystitis   - CT Chest/Abd/Pel showed possible acute kevin on chronic kevin, decreased appearance of liver abscess - small abscess remains.  - Lactate downtrended to wnl.  - Continue pain regimen and Tylenol PRN for fever.  - leukocytosis improving  - BCx positive for Klebsiella -- repeat cultures negative so far   - on IV Zosyn -- f/u ID recs (Dr. Solis group) -- even though cultures cleared, sensitivities coming back as intermediate to zosyn so consider switching Abx

## 2019-01-05 NOTE — PROGRESS NOTE ADULT - ASSESSMENT
69 yo F with PMH of ESRD on HD (M, W, F), CAD s/p stents 2007, Type 2 DM, HTN, MI, recent admission with ascending cholangitis with ESBL E coli bacteremia s/p biliary stent, recent admission with liver abscesses (on Tigecycline), who presents to the ED with abdominal pain x2 days, 4 episodes of vomiting. Admitted for sepsis due to suspected acute on chronic cholecystitis.

## 2019-01-05 NOTE — PROGRESS NOTE ADULT - SUBJECTIVE AND OBJECTIVE BOX
IRENE TAYLOR  68y  Female    Patient is a 68y old  Female who presents with a chief complaint of abd pain, vomiting (05 Jan 2019 10:29)      HPI:  Pt is a 69 yo F with PMH of ESRD on HD (M, W, F), CAD s/p stents 2007, DM, HTN, MI, biliary stent, Liver abscess who presents to the ED with abdominal pain x2 days, 4 episodes of vomiting. Pt states that the abdominal pain started yesterday and feels a little better today. Pt denies exacerbating or relieving factors, pain does not change with food or movement. Pain described as non-radiating and constant and is located in the epigastrium. Pt has taken nothing for the pain. Pt had 4 episodes of non-bloody emesis and states it is yellow in color. Pt reports subjective fever and chills. Pt admits to weakness. Pt denies CP, palpitations, SOB, cough, myalgias, rash.     seen on dialysis.     PAST MEDICAL & SURGICAL HISTORY:  Cholecystitis with cholangitis  Acute urinary retention  Liver abscess  ESRD (end stage renal disease) on dialysis: MWF  CAD (coronary artery disease): s/p stent in 2007  Diabetes  Myocardial infarction  Hypertension  H/O: hysterectomy          PHYSICAL EXAM:    T(C): 36.6 (01-05-19 @ 15:45), Max: 37.3 (01-05-19 @ 04:35)  HR: 79 (01-05-19 @ 15:45) (77 - 82)  BP: 114/69 (01-05-19 @ 15:45) (101/63 - 170/72)  RR: 17 (01-05-19 @ 15:45) (16 - 19)  SpO2: 95% (01-05-19 @ 15:45) (91% - 100%)  Wt(kg): --    I&O's Detail    05 Jan 2019 07:01  -  05 Jan 2019 16:38  --------------------------------------------------------  IN:  Total IN: 0 mL    OUT:    Other: 1800 mL  Total OUT: 1800 mL    Total NET: -1800 mL          Respiratory: clear anteriorly, decreased BS at bases  Cardiovascular: S1 S2  Gastrointestinal: soft NT ND +BS  Extremities: edema   Neuro: Awake and alert    MEDICATIONS  (STANDING):  aspirin enteric coated 81 milliGRAM(s) Oral daily  atorvastatin 80 milliGRAM(s) Oral at bedtime  dextrose 5% + sodium chloride 0.45%. 1000 milliLiter(s) (60 mL/Hr) IV Continuous <Continuous>  dextrose 5%. 1000 milliLiter(s) (50 mL/Hr) IV Continuous <Continuous>  dextrose 50% Injectable 12.5 Gram(s) IV Push once  dextrose 50% Injectable 25 Gram(s) IV Push once  dextrose 50% Injectable 25 Gram(s) IV Push once  docusate sodium 100 milliGRAM(s) Oral daily  gabapentin 300 milliGRAM(s) Oral two times a day  heparin  Injectable 5000 Unit(s) SubCutaneous every 8 hours  insulin lispro (HumaLOG) corrective regimen sliding scale   SubCutaneous three times a day before meals  insulin lispro (HumaLOG) corrective regimen sliding scale   SubCutaneous at bedtime  isosorbide   mononitrate ER Tablet (IMDUR) 30 milliGRAM(s) Oral at bedtime  isosorbide   mononitrate ER Tablet (IMDUR) 60 milliGRAM(s) Oral daily  metoprolol tartrate 50 milliGRAM(s) Oral two times a day  NIFEdipine XL 60 milliGRAM(s) Oral daily  piperacillin/tazobactam IVPB. 3.375 Gram(s) IV Intermittent every 12 hours  senna 2 Tablet(s) Oral at bedtime  sodium chloride 0.9%. 1000 milliLiter(s) (50 mL/Hr) IV Continuous <Continuous>    MEDICATIONS  (PRN):  dextrose 40% Gel 15 Gram(s) Oral once PRN Blood Glucose LESS THAN 70 milliGRAM(s)/deciliter  glucagon  Injectable 1 milliGRAM(s) IntraMuscular once PRN Glucose LESS THAN 70 milligrams/deciliter  HYDROmorphone  Injectable 0.5 milliGRAM(s) IV Push every 6 hours PRN Moderate Pain (4 - 6)  HYDROmorphone  Injectable 1 milliGRAM(s) IV Push every 8 hours PRN Severe Pain (7 - 10)                            10.9   14.66 )-----------( 224      ( 05 Jan 2019 06:17 )             35.2       01-05    135  |  97  |  31<H>  ----------------------------<  151<H>  4.2   |  28  |  5.40<H>    Ca    7.8<L>      05 Jan 2019 06:17    TPro  6.4  /  Alb  1.2<L>  /  TBili  0.8  /  DBili  .60<H>  /  AST  60<H>  /  ALT  39  /  AlkPhos  380<H>  01-05

## 2019-01-05 NOTE — PROGRESS NOTE ADULT - ASSESSMENT
67 yo F with PMH of ESRD on HD (M, W, F) via permacath started in June 2018, CAD s/p 1 stent 2007, DM type 2, HTN, MI, biliary stent, Liver abscess, obesity who presents to the ED with abdominal pain x2 days, 4 episodes of vomiting. Currently admitted for sepsis 2/2 acute on chronic cholecystitis vs failed treatment of liver abscess. Patient had recent admission at [facility] November 2018 for acute cholecystitis causing sepsis, S/P Perc Noa 10/30 and ESBL E. coli bacteremia. She was treated with IV ertapenem and developed A fIb with RVR. ERCP was attempted at [facility] but was unsuccessful and was transferred to [facility] where ERCP was performed with stone extraction and stent placement. She also had another admission in November 2018 at [place] shortly after for acute pancreatitis. Patient has been in rehab s/p last discharge from [facility]    - No clear evidence of acute ischemia.  Monitor closely for the development of anginal symptoms or clinical signs of ischemia.   - Continue asa, Statin, imdur with hx of CAD s/p 1 stent in 2007. Has been on dual antiplatelet therapy since. Outpatient cardiologist is Dr Rachel Black in [place].   -Plavix currently being held for possible surgical intervention; please resume when cleared by surgery  - No acute changes on EKG compared to previous.  - No meaningful evidence of volume overload.  - BP well controlled, monitor routine hemodynamics. Continue metoprolol and procardia  - Monitor and replete lytes, keep K>4, Mg>2.  - Other cardiovascular workup will depend on clinical course.  - All other workup per primary team.  - Will continue to follow.  Ab Linda [facility]  Cardiology 67 yo F with PMH of ESRD on HD (M, W, F) via permacath started in June 2018, CAD s/p 1 stent 2007, DM type 2, HTN, MI, biliary stent, Liver abscess, obesity who presents to the ED with abdominal pain x2 days, 4 episodes of vomiting. Currently admitted for sepsis 2/2 acute on chronic cholecystitis vs failed treatment of liver abscess. Patient had recent admission at [facility] November 2018 for acute cholecystitis causing sepsis, S/P Perc Noa 10/30 and ESBL E. coli bacteremia. She was treated with IV ertapenem and developed A fIb with RVR. ERCP was attempted at [facility] but was unsuccessful and was transferred to [facility] where ERCP was performed with stone extraction and stent placement. She also had another admission in November 2018 at [place] shortly after for acute pancreatitis. Patient has been in rehab s/p last discharge from [facility]. Overnight with several episodes of vomiting.    - No clear evidence of acute ischemia.  Monitor closely for the development of anginal symptoms or clinical signs of ischemia.   - Continue asa, Statin, imdur with hx of CAD s/p 1 stent in 2007. Has been on dual antiplatelet therapy since. Outpatient cardiologist is Dr Rachel Blakc in [place].   -Plavix currently being held for possible surgical intervention; please resume when cleared by surgery  - surgery is now being considered possibly for monday or tuesday, will follow carefully  - No acute changes on EKG compared to previous.  - No meaningful evidence of volume overload.  - BP well controlled, monitor routine hemodynamics. Continue metoprolol and procardia  - Monitor and replete lytes, keep K>4, Mg>2.  - Other cardiovascular workup will depend on clinical course.  - All other workup per primary team.  - Will continue to follow.  Ab Linda [facility]  Cardiology

## 2019-01-05 NOTE — PROGRESS NOTE ADULT - PROBLEM SELECTOR PLAN 2
suspect acute on chronic per CT and symptoms on admission --- HIDA r/out acute kevin at the time of the scan so perhaps pt had obstruction that cleared by time of HIDA  - Continue abx  - Per Gen Surg (Dr. Campos), will hold off on cholecystectomy for now and consider it Monday/Tuesday  - Per GI (Dr. Camacho), CBD stent still in place and will need to be removed after lap kevin.

## 2019-01-05 NOTE — PROGRESS NOTE ADULT - ASSESSMENT
68 female with a history of DM. PVD, HTN, CAD, CHF, Cirrhosis and ESRD On HD now admitted with abdominal pain.   Stable dialysis via perma cath. Heparin free dialysis. Will follow.

## 2019-01-05 NOTE — PROGRESS NOTE ADULT - PROBLEM SELECTOR PLAN 5
s/p stent 2007  - Continue home dose atorvastatin, metoprolol (with hold parameters) and aspirin.  - Per surgery, holding Plavix now for possible lap kevin.

## 2019-01-05 NOTE — DIETITIAN INITIAL EVALUATION ADULT. - PROBLEM SELECTOR PLAN 6
-Hold home medications: basaglar 7u QHS  -Hypoglycemia Protocol, Low Dose Insulin Sliding Coverage, Accuchecks AC&HS.  -Diet: NPO  -Follow up HbA1c.

## 2019-01-05 NOTE — DIETITIAN INITIAL EVALUATION ADULT. - PROBLEM SELECTOR PLAN 1
- Pt meets sepsis criteria (fever, leukocytosis, source) 2/2 acute on chronic cholecystitis vs failed abx tx of liver abscess  - admit to tele  - CT Chest/Abd/Pel - poss acute kevin on chronic kevin, decr conspicousity of liver abscess - sm abscess remains  - lactate 4.0, repeat lactate 2.8 s/p 1.5L NS  - tylenol prn for fever  - trend WBCs  - consult ID (Will)   - c/w abx - pending ID recs  spoke with dr perales as per him she has received appropriate dose of zosyn  and cipro in er no antibiotics tonight will evaluate patient in am

## 2019-01-06 LAB
ALBUMIN SERPL ELPH-MCNC: 1.1 G/DL — LOW (ref 3.3–5)
ALP SERPL-CCNC: 409 U/L — HIGH (ref 40–120)
ALT FLD-CCNC: 35 U/L — SIGNIFICANT CHANGE UP (ref 12–78)
ANION GAP SERPL CALC-SCNC: 8 MMOL/L — SIGNIFICANT CHANGE UP (ref 5–17)
AST SERPL-CCNC: 54 U/L — HIGH (ref 15–37)
BASOPHILS # BLD AUTO: 0.02 K/UL — SIGNIFICANT CHANGE UP (ref 0–0.2)
BASOPHILS NFR BLD AUTO: 0.2 % — SIGNIFICANT CHANGE UP (ref 0–2)
BILIRUB SERPL-MCNC: 0.7 MG/DL — SIGNIFICANT CHANGE UP (ref 0.2–1.2)
BUN SERPL-MCNC: 15 MG/DL — SIGNIFICANT CHANGE UP (ref 7–23)
CALCIUM SERPL-MCNC: 7.5 MG/DL — LOW (ref 8.5–10.1)
CHLORIDE SERPL-SCNC: 100 MMOL/L — SIGNIFICANT CHANGE UP (ref 96–108)
CO2 SERPL-SCNC: 29 MMOL/L — SIGNIFICANT CHANGE UP (ref 22–31)
CREAT SERPL-MCNC: 3.8 MG/DL — HIGH (ref 0.5–1.3)
EOSINOPHIL # BLD AUTO: 0.71 K/UL — HIGH (ref 0–0.5)
EOSINOPHIL NFR BLD AUTO: 6.1 % — HIGH (ref 0–6)
GLUCOSE SERPL-MCNC: 147 MG/DL — HIGH (ref 70–99)
HCT VFR BLD CALC: 32.6 % — LOW (ref 34.5–45)
HGB BLD-MCNC: 10 G/DL — LOW (ref 11.5–15.5)
IMM GRANULOCYTES NFR BLD AUTO: 0.8 % — SIGNIFICANT CHANGE UP (ref 0–1.5)
LYMPHOCYTES # BLD AUTO: 2.7 K/UL — SIGNIFICANT CHANGE UP (ref 1–3.3)
LYMPHOCYTES # BLD AUTO: 23.1 % — SIGNIFICANT CHANGE UP (ref 13–44)
MCHC RBC-ENTMCNC: 28.7 PG — SIGNIFICANT CHANGE UP (ref 27–34)
MCHC RBC-ENTMCNC: 30.7 GM/DL — LOW (ref 32–36)
MCV RBC AUTO: 93.4 FL — SIGNIFICANT CHANGE UP (ref 80–100)
MONOCYTES # BLD AUTO: 1.03 K/UL — HIGH (ref 0–0.9)
MONOCYTES NFR BLD AUTO: 8.8 % — SIGNIFICANT CHANGE UP (ref 2–14)
NEUTROPHILS # BLD AUTO: 7.14 K/UL — SIGNIFICANT CHANGE UP (ref 1.8–7.4)
NEUTROPHILS NFR BLD AUTO: 61 % — SIGNIFICANT CHANGE UP (ref 43–77)
PLATELET # BLD AUTO: 218 K/UL — SIGNIFICANT CHANGE UP (ref 150–400)
POTASSIUM SERPL-MCNC: 3.8 MMOL/L — SIGNIFICANT CHANGE UP (ref 3.5–5.3)
POTASSIUM SERPL-SCNC: 3.8 MMOL/L — SIGNIFICANT CHANGE UP (ref 3.5–5.3)
PROT SERPL-MCNC: 6 G/DL — SIGNIFICANT CHANGE UP (ref 6–8.3)
RBC # BLD: 3.49 M/UL — LOW (ref 3.8–5.2)
RBC # FLD: 22.3 % — HIGH (ref 10.3–14.5)
SODIUM SERPL-SCNC: 137 MMOL/L — SIGNIFICANT CHANGE UP (ref 135–145)
WBC # BLD: 11.69 K/UL — HIGH (ref 3.8–10.5)
WBC # FLD AUTO: 11.69 K/UL — HIGH (ref 3.8–10.5)

## 2019-01-06 PROCEDURE — 99233 SBSQ HOSP IP/OBS HIGH 50: CPT

## 2019-01-06 PROCEDURE — 93010 ELECTROCARDIOGRAM REPORT: CPT

## 2019-01-06 PROCEDURE — 99232 SBSQ HOSP IP/OBS MODERATE 35: CPT

## 2019-01-06 RX ORDER — CEFTRIAXONE 500 MG/1
1 INJECTION, POWDER, FOR SOLUTION INTRAMUSCULAR; INTRAVENOUS EVERY 24 HOURS
Qty: 0 | Refills: 0 | Status: DISCONTINUED | OUTPATIENT
Start: 2019-01-06 | End: 2019-01-10

## 2019-01-06 RX ORDER — ONDANSETRON 8 MG/1
4 TABLET, FILM COATED ORAL ONCE
Qty: 0 | Refills: 0 | Status: COMPLETED | OUTPATIENT
Start: 2019-01-06 | End: 2019-01-06

## 2019-01-06 RX ORDER — METRONIDAZOLE 500 MG
500 TABLET ORAL EVERY 8 HOURS
Qty: 0 | Refills: 0 | Status: DISCONTINUED | OUTPATIENT
Start: 2019-01-06 | End: 2019-01-10

## 2019-01-06 RX ADMIN — Medication 60 MILLIGRAM(S): at 05:07

## 2019-01-06 RX ADMIN — ISOSORBIDE MONONITRATE 60 MILLIGRAM(S): 60 TABLET, EXTENDED RELEASE ORAL at 11:11

## 2019-01-06 RX ADMIN — ISOSORBIDE MONONITRATE 30 MILLIGRAM(S): 60 TABLET, EXTENDED RELEASE ORAL at 22:42

## 2019-01-06 RX ADMIN — CEFTRIAXONE 100 GRAM(S): 500 INJECTION, POWDER, FOR SOLUTION INTRAMUSCULAR; INTRAVENOUS at 18:22

## 2019-01-06 RX ADMIN — SENNA PLUS 2 TABLET(S): 8.6 TABLET ORAL at 22:42

## 2019-01-06 RX ADMIN — HEPARIN SODIUM 5000 UNIT(S): 5000 INJECTION INTRAVENOUS; SUBCUTANEOUS at 05:08

## 2019-01-06 RX ADMIN — GABAPENTIN 300 MILLIGRAM(S): 400 CAPSULE ORAL at 05:08

## 2019-01-06 RX ADMIN — Medication 100 MILLIGRAM(S): at 22:42

## 2019-01-06 RX ADMIN — Medication 81 MILLIGRAM(S): at 11:11

## 2019-01-06 RX ADMIN — ONDANSETRON 4 MILLIGRAM(S): 8 TABLET, FILM COATED ORAL at 12:19

## 2019-01-06 RX ADMIN — Medication 50 MILLIGRAM(S): at 17:47

## 2019-01-06 RX ADMIN — Medication 50 MILLIGRAM(S): at 05:07

## 2019-01-06 RX ADMIN — PIPERACILLIN AND TAZOBACTAM 25 GRAM(S): 4; .5 INJECTION, POWDER, LYOPHILIZED, FOR SOLUTION INTRAVENOUS at 05:08

## 2019-01-06 RX ADMIN — HEPARIN SODIUM 5000 UNIT(S): 5000 INJECTION INTRAVENOUS; SUBCUTANEOUS at 22:42

## 2019-01-06 RX ADMIN — ATORVASTATIN CALCIUM 80 MILLIGRAM(S): 80 TABLET, FILM COATED ORAL at 22:42

## 2019-01-06 RX ADMIN — GABAPENTIN 300 MILLIGRAM(S): 400 CAPSULE ORAL at 17:47

## 2019-01-06 RX ADMIN — HEPARIN SODIUM 5000 UNIT(S): 5000 INJECTION INTRAVENOUS; SUBCUTANEOUS at 13:27

## 2019-01-06 NOTE — DISCHARGE NOTE ADULT - CARE PROVIDER_API CALL
Jaden Albarran), Medicine  [address] [place] [address]  Phone: [phone]  Fax: [phone]    Chris, Primary Medical Doctor  Phone: (   )    -  Fax: (   )    -    Rachel Black  Phone: (   )    -  Fax: (   )    - Jaden Albarran), Medicine  [address]  [address]  [place] [address]  Phone: [phone]  Fax: [phone]    Chris, Primary Medical Doctor  Phone: (   )    -  Fax: (   )    -    Rachel Black  Phone: (   )    -  Fax: (   )    -    Fabio Flores), Surgery  [address]  [place] [address]  Phone: [phone]  Fax: [phone]

## 2019-01-06 NOTE — DISCHARGE NOTE ADULT - ADDITIONAL INSTRUCTIONS
- Follow up with your primary medical doctor, nephrologist and cardiologist within 1 week of discharge.  - Patient and family are responsible to set up all follow up appointments.  - Continue taking your medications as directed above.  - If symptoms persist/worsen, please call your PMD or return to the ED.

## 2019-01-06 NOTE — PROGRESS NOTE ADULT - SUBJECTIVE AND OBJECTIVE BOX
INTERVAL HPI/OVERNIGHT EVENTS:  No new overnight event.  No N/V/D.  Tolerating diet.  less pain    Allergies    ertapenem (Urticaria)  Purell (Rash)    Intolerances    General:  No wt loss, fevers, chills, night sweats, fatigue,   Eyes:  Good vision, no reported pain  ENT:  No sore throat, pain, runny nose, dysphagia  CV:  No pain, palpitations, hypo/hypertension  Resp:  No dyspnea, cough, tachypnea, wheezing  GI:  No pain, No nausea, No vomiting, No diarrhea, No constipation, No weight loss, No fever, No pruritis, No rectal bleeding, No tarry stools, No dysphagia,  :  No pain, bleeding, incontinence, nocturia  Muscle:  No pain, weakness  Neuro:  No weakness, tingling, memory problems  Psych:  No fatigue, insomnia, mood problems, depression  Endocrine:  No polyuria, polydipsia, cold/heat intolerance  Heme:  No petechiae, ecchymosis, easy bruisability  Skin:  No rash, tattoos, scars, edema      PHYSICAL EXAM:   Vital Signs:  Vital Signs Last 24 Hrs  T(C): 36.6 (2019 12:36), Max: 37.6 (2019 19:58)  T(F): 97.8 (2019 12:36), Max: 99.6 (2019 19:58)  HR: 79 (2019 12:36) (76 - 89)  BP: 174/88 (2019 12:36) (114/69 - 174/88)  BP(mean): --  RR: 17 (2019 12:36) (16 - 18)  SpO2: 98% (2019 12:36) (95% - 100%)  Daily     Daily Weight in k.1 (2019 04:23)I&O's Summary    2019 07:01  -  2019 07:00  --------------------------------------------------------  IN: 340 mL / OUT: 1800 mL / NET: -1460 mL        GENERAL:  Appears stated age, well-groomed, well-nourished, no distress  HEENT:  NC/AT,  conjunctivae clear and pink, no thyromegaly, nodules, adenopathy, no JVD, sclera -anicteric  CHEST:  Full & symmetric excursion, no increased effort, breath sounds clear  HEART:  Regular rhythm, S1, S2, no murmur/rub/S3/S4, no abdominal bruit, no edema  ABDOMEN:  Soft, non-tender, non-distended, normoactive bowel sounds,  no masses ,no hepato-splenomegaly, no signs of chronic liver disease  EXTEREMITIES:  no cyanosis,clubbing or edema  SKIN:  No rash/erythema/ecchymoses/petechiae/wounds/abscess/warm/dry  NEURO:  Alert, oriented, no asterixis, no tremor, no encephalopathy      LABS:                        10.0   11.69 )-----------( 218      ( 2019 08:10 )             32.6     01-06    137  |  100  |  15  ----------------------------<  147<H>  3.8   |  29  |  3.80<H>    Ca    7.5<L>      2019 08:10  Phos  3.1     -  Mg     1.9     -    TPro  6.0  /  Alb  1.1<L>  /  TBili  0.7  /  DBili  x   /  AST  54<H>  /  ALT  35  /  AlkPhos  409<H>  -        amylase   lipaseLipase, Serum: 127 U/L ( @ 13:33)    RADIOLOGY & ADDITIONAL TESTS:

## 2019-01-06 NOTE — DISCHARGE NOTE ADULT - MEDICATION SUMMARY - MEDICATIONS TO TAKE
I will START or STAY ON the medications listed below when I get home from the hospital:    metroNIDAZOLE 500 mg oral tablet  -- 1 tab(s) by mouth every 8 hours  -- Indication: For Cholecystitis    aspirin 81 mg oral delayed release tablet  -- 1 tab(s) by mouth once a day  -- Indication: For CAD (coronary artery disease)    isosorbide mononitrate 30 mg oral tablet, extended release  -- 1 tab(s) by mouth once a day (at bedtime)  -- Indication: For CAD (coronary artery disease)    isosorbide mononitrate 60 mg oral tablet, extended release  -- 1 tab(s) by mouth once a day  -- Indication: For CAD (coronary artery disease)    heparin  -- 5000 unit(s) subcutaneous every 8 hours  -- Indication: For Dvt prophylaxis    gabapentin 300 mg oral capsule  -- 1 cap(s) by mouth once a day (at bedtime)  -- Indication: For neuropathic pain    Basaglar KwikPen 100 units/mL subcutaneous solution  -- 7 unit(s) subcutaneous once a day (at bedtime)   -- Do not drink alcoholic beverages when taking this medication.  It is very important that you take or use this exactly as directed.  Do not skip doses or discontinue unless directed by your doctor.  Keep in refrigerator.  Do not freeze.    -- Indication: For Diabetes    bismuth subsalicylate 262 mg/15 mL oral suspension  -- 30 milliliter(s) by mouth 3 times a day, As needed, Diarrhea  -- Indication: For Diarrhea    cholestyramine 4 g/9 g oral powder for reconstitution  --  by mouth   -- Indication: For Cholecystitis    atorvastatin 80 mg oral tablet  -- 1 tab(s) by mouth once a day (at bedtime)  -- Indication: For CAD (coronary artery disease)    metoprolol tartrate 50 mg oral tablet  -- 1 tab(s) by mouth 2 times a day  -- Indication: For Hypertension    NIFEdipine 60 mg oral tablet, extended release  -- 1 tab(s) by mouth once a day  -- Indication: For Hypertension    lidocaine 5% topical film  --  on skin   -- Indication: For Pain     epoetin analilia  -- 70269 unit(s) intravenous 3 times a day T/T/S  intra-dialysis   -- Indication: For Anemia    senna oral tablet  -- 2 tab(s) by mouth once a day (at bedtime)  -- Indication: For Constipation    Colace 100 mg oral capsule  -- 1 cap(s) by mouth once a day   -- Medication should be taken with plenty of water.    -- Indication: For Constipation     lactobacillus acidophilus oral capsule  -- 1 tab(s) by mouth 3 times a day with meals  -- Indication: For Probiotic    pantoprazole 40 mg oral delayed release tablet  -- 1 tab(s) by mouth once a day (before a meal)  -- Indication: For reflux    ciprofloxacin 250 mg oral tablet  -- 1 tab(s) by mouth once a day (at bedtime)  -- Indication: For Cholecystitis I will START or STAY ON the medications listed below when I get home from the hospital:    metroNIDAZOLE 500 mg oral tablet  -- 1 tab(s) by mouth every 8 hours last day 1/17   -- Indication: For Bacteremia due to Gram-negative bacteria    aspirin 81 mg oral delayed release tablet  -- 1 tab(s) by mouth once a day  -- Indication: For CAD (coronary artery disease)    isosorbide mononitrate 30 mg oral tablet, extended release  -- 1 tab(s) by mouth once a day (at bedtime)  -- Indication: For CAD (coronary artery disease)    isosorbide mononitrate 60 mg oral tablet, extended release  -- 1 tab(s) by mouth once a day  -- Indication: For CAD (coronary artery disease)    gabapentin 300 mg oral capsule  -- 1 cap(s) by mouth once a day (at bedtime)  -- Indication: For neuropathic pain    Basaglar KwikPen 100 units/mL subcutaneous solution  -- 7 unit(s) subcutaneous once a day (at bedtime)   -- Do not drink alcoholic beverages when taking this medication.  It is very important that you take or use this exactly as directed.  Do not skip doses or discontinue unless directed by your doctor.  Keep in refrigerator.  Do not freeze.    -- Indication: For Diabetes    bismuth subsalicylate 262 mg/15 mL oral suspension  -- 30 milliliter(s) by mouth 3 times a day, As needed, Diarrhea  -- Indication: For Loose stools    cholestyramine 4 g/9 g oral powder for reconstitution  -- 4 gram(s) by mouth 4 times a day, As Needed for diarrhea   -- Indication: For Loose stools    atorvastatin 80 mg oral tablet  -- 1 tab(s) by mouth once a day (at bedtime)  -- Indication: For CAD (coronary artery disease)    metoprolol tartrate 50 mg oral tablet  -- 1 tab(s) by mouth 2 times a day  -- Indication: For Hypertension    NIFEdipine 60 mg oral tablet, extended release  -- 1 tab(s) by mouth once a day  -- Indication: For Hypertension    lidocaine 4% topical kit  -- Apply on skin to affected area once a day, As Needed   -- For external use only.    -- Indication: For Pain     epoetin analilia  -- 16503 unit(s) intravenous 3 times a day T/T/S  intra-dialysis   -- Indication: For Anemia    lactobacillus acidophilus oral capsule  -- 1 tab(s) by mouth 3 times a day   -- Indication: For Prophylacxis     pantoprazole 40 mg oral delayed release tablet  -- 1 tab(s) by mouth once a day (before a meal)  -- Indication: For Pprophylxitic     ciprofloxacin 250 mg oral tablet  -- 1 tab(s) by mouth once a day (at bedtime) until 1/17/19   -- Indication: For Bacteremia due to Gram-negative bacteria I will START or STAY ON the medications listed below when I get home from the hospital:    metroNIDAZOLE 500 mg oral tablet  -- 1 tab(s) by mouth every 8 hours last day 1/17   -- Indication: For Bacteremia due to Gram-negative bacteria    aspirin 81 mg oral delayed release tablet  -- 1 tab(s) by mouth once a day  -- Indication: For CAD (coronary artery disease)    isosorbide mononitrate 30 mg oral tablet, extended release  -- 1 tab(s) by mouth once a day (at bedtime)  -- Indication: For CAD (coronary artery disease)    isosorbide mononitrate 60 mg oral tablet, extended release  -- 1 tab(s) by mouth once a day  -- Indication: For CAD (coronary artery disease)    gabapentin 300 mg oral capsule  -- 1 cap(s) by mouth once a day (at bedtime)  -- Indication: For neuropathic pain    Basaglar KwikPen 100 units/mL subcutaneous solution  -- 7 unit(s) subcutaneous once a day (at bedtime)   -- Do not drink alcoholic beverages when taking this medication.  It is very important that you take or use this exactly as directed.  Do not skip doses or discontinue unless directed by your doctor.  Keep in refrigerator.  Do not freeze.    -- Indication: For Diabetes    bismuth subsalicylate 262 mg/15 mL oral suspension  -- 30 milliliter(s) by mouth 3 times a day, As needed, Diarrhea  -- Indication: For Loose stools    cholestyramine 4 g/9 g oral powder for reconstitution  -- 4 gram(s) by mouth 4 times a day, As Needed for diarrhea   -- Indication: For Loose stools    atorvastatin 80 mg oral tablet  -- 1 tab(s) by mouth once a day (at bedtime)  -- Indication: For CAD (coronary artery disease)    metoprolol tartrate 50 mg oral tablet  -- 1 tab(s) by mouth 2 times a day  -- Indication: For Hypertension    NIFEdipine 60 mg oral tablet, extended release  -- 1 tab(s) by mouth once a day  -- Indication: For Hypertension    lidocaine 4% topical kit  -- Apply on skin to affected area once a day, As Needed   -- For external use only.    -- Indication: For Pain     epoetin analilia  -- 48827 unit(s) intravenous 3 times a day T/T/S  intra-dialysis   -- Indication: For Anemia    lactobacillus acidophilus oral capsule  -- 1 tab(s) by mouth 3 times a day   -- Indication: For Prophylacxis     pantoprazole 40 mg oral delayed release tablet  -- 1 tab(s) by mouth once a day (before a meal)  -- Indication: For Pprophylxitic     ciprofloxacin 250 mg oral tablet  -- 1 tab(s) by mouth once a day (at bedtime) until 1/17/19   -- Indication: For Bacteremia due to Gram-negative bacteria

## 2019-01-06 NOTE — DISCHARGE NOTE ADULT - PLAN OF CARE
Improvement - You were evaluated by general surgery and HIDA scan was without evidence of acute cholecystitis, cholecystectomy was held until bacteremia was treated.  - Initial blood cultures were positive, you were treated with IV antibiotics, subsequent blood cultures showed no growth to date.  - Follow up with surgery and PMD as outpatient within 1 week of discharge for further management. You have received IV antibiotics through your PICC line, and your liver abscesses appear to be getting smaller on CT. Continue __ continue hemodialysis as directed. Continue metoprolol and nifedipine as directed. Continue Basaglar as directed, - You were admitted for acute cholecystitis and were evaluated by a general surgeon, Dr. Campos. It was decided that you would not benefit from having your gallbladder removed on this admission due to the presence of bacteria in your blood.  - Initial blood cultures were positive for a bacteria called Klebsiella, you were treated with IV antibiotics, subsequent blood cultures did not grow any bacteria.  - Follow up with surgery, Dr. Flores, and PMD as outpatient within 1 week of discharge for further management. - You were admitted for suspected acute on chronic cholecystitis, HIDA scan was found to be negative. You were evaluated by general surgery.   - Initial blood cultures were positive for a bacteria called Klebsiella, you were treated with IV antibiotics, subsequent blood cultures did not grow any bacteria.  - Follow up with your PMD as outpatient within 1 week of discharge for further management. - You have received IV antibiotics through your PICC line, and your liver abscesses appeared to be getting smaller on CT.  - Follow up with your PMD as outpatient within 1 week of discharge for further management. Continue hemodialysis as directed. Continue Basaglar as directed. - Do not resume Plavix, per cardiology's recommendations.  - Follow up with as outpatient with your cardiologist within 1 week of discharge regarding taking Plavix and for further management. Improvement of symptoms Continue hemodialysis as directed. Follow up with your nephrologist as outpatient. - You were admitted for suspected acute on chronic cholecystitis, HIDA scan was found to be negative. You were evaluated by general surgery who recommended outpatient follow up.   - Initial blood cultures were positive for a bacteria called Klebsiella, you were treated with IV antibiotics, subsequent blood cultures did not grow any bacteria. You should continue with oral Ciprofloxacin 250mg daily at bedtime and Flagyl 500mg every 8hours until 1/17/19.   - Follow up with your PMD as outpatient within 1 week of discharge for further management.

## 2019-01-06 NOTE — PROGRESS NOTE ADULT - ASSESSMENT
67 yo female with hx of ascending cholangitis with sepsis 2 months ago and had perc cholecystostomy tube.  PT improved and d/c home where she came back 1 month later with hepatic abscesses.  PT subsequently d/c and now readmitted with leukocytosis. HIDA done was negative for cholecystitis.      cont abx      cont workup for klebsiella pneumonia       will d/w team regarding optimal timing of cholecystitis

## 2019-01-06 NOTE — PROGRESS NOTE ADULT - ASSESSMENT
67yo F with PMH of ESRD on HD (M, W, F), CAD s/p stents 2007, Type 2 DM, HTN, MI, recent admission with ascending cholangitis with ESBL E coli bacteremia s/p biliary stent, recent admission with liver abscesses (on Tigecycline), who presents to the ED with abdominal pain x2 days, 4 episodes of vomiting, admitted for sepsis due to suspected acute on chronic cholecystitis found to have Klebsiella bacteremia.

## 2019-01-06 NOTE — PROGRESS NOTE ADULT - SUBJECTIVE AND OBJECTIVE BOX
TAYLORIRENE  68y  Female    Patient is a 68y old  Female who presents with a chief complaint of abd pain, vomiting (06 Jan 2019 13:35)      comfortable.     PAST MEDICAL & SURGICAL HISTORY:  Cholecystitis with cholangitis  Acute urinary retention  Liver abscess  ESRD (end stage renal disease) on dialysis: MWF  CAD (coronary artery disease): s/p stent in 2007  Diabetes  Myocardial infarction  Hypertension  H/O: hysterectomy      PHYSICAL EXAM:    T(C): 36.6 (01-06-19 @ 12:36), Max: 37.6 (01-05-19 @ 19:58)  HR: 79 (01-06-19 @ 12:36) (76 - 89)  BP: 174/88 (01-06-19 @ 12:36) (114/69 - 174/88)  RR: 17 (01-06-19 @ 12:36) (17 - 18)  SpO2: 98% (01-06-19 @ 12:36) (95% - 99%)  Wt(kg): --    I&O's Detail    05 Jan 2019 07:01  -  06 Jan 2019 07:00  --------------------------------------------------------  IN:    IV PiggyBack: 100 mL    Oral Fluid: 240 mL  Total IN: 340 mL    OUT:    Other: 1800 mL  Total OUT: 1800 mL    Total NET: -1460 mL          Respiratory: clear anteriorly, decreased BS at bases  Cardiovascular: S1 S2  Gastrointestinal: soft NT ND +BS  Extremities: edema   Neuro: Awake and alert    MEDICATIONS  (STANDING):  aspirin enteric coated 81 milliGRAM(s) Oral daily  atorvastatin 80 milliGRAM(s) Oral at bedtime  dextrose 5%. 1000 milliLiter(s) (50 mL/Hr) IV Continuous <Continuous>  dextrose 50% Injectable 12.5 Gram(s) IV Push once  dextrose 50% Injectable 25 Gram(s) IV Push once  dextrose 50% Injectable 25 Gram(s) IV Push once  docusate sodium 100 milliGRAM(s) Oral daily  gabapentin 300 milliGRAM(s) Oral two times a day  heparin  Injectable 5000 Unit(s) SubCutaneous every 8 hours  insulin lispro (HumaLOG) corrective regimen sliding scale   SubCutaneous three times a day before meals  insulin lispro (HumaLOG) corrective regimen sliding scale   SubCutaneous at bedtime  isosorbide   mononitrate ER Tablet (IMDUR) 30 milliGRAM(s) Oral at bedtime  isosorbide   mononitrate ER Tablet (IMDUR) 60 milliGRAM(s) Oral daily  metoprolol tartrate 50 milliGRAM(s) Oral two times a day  NIFEdipine XL 60 milliGRAM(s) Oral daily  piperacillin/tazobactam IVPB. 3.375 Gram(s) IV Intermittent every 12 hours  senna 2 Tablet(s) Oral at bedtime    MEDICATIONS  (PRN):  dextrose 40% Gel 15 Gram(s) Oral once PRN Blood Glucose LESS THAN 70 milliGRAM(s)/deciliter  glucagon  Injectable 1 milliGRAM(s) IntraMuscular once PRN Glucose LESS THAN 70 milligrams/deciliter  HYDROmorphone  Injectable 0.5 milliGRAM(s) IV Push every 6 hours PRN Moderate Pain (4 - 6)  HYDROmorphone  Injectable 1 milliGRAM(s) IV Push every 8 hours PRN Severe Pain (7 - 10)                            10.0   11.69 )-----------( 218      ( 06 Jan 2019 08:10 )             32.6       01-06    137  |  100  |  15  ----------------------------<  147<H>  3.8   |  29  |  3.80<H>    Ca    7.5<L>      06 Jan 2019 08:10  Phos  3.1     01-06  Mg     1.9     01-06    TPro  6.0  /  Alb  1.1<L>  /  TBili  0.7  /  DBili  x   /  AST  54<H>  /  ALT  35  /  AlkPhos  409<H>  01-06

## 2019-01-06 NOTE — DISCHARGE NOTE ADULT - PATIENT PORTAL LINK FT
You can access the [facility], offered by [facility], by registering with the following website: [email or web address]

## 2019-01-06 NOTE — PROGRESS NOTE ADULT - SUBJECTIVE AND OBJECTIVE BOX
Patient is a 68y old  Female who presents with a chief complaint of abd pain, vomiting.    INTERVAL HPI/OVERNIGHT EVENTS: Pt reports abd pain resolved. No vomiting. No fever, chills, CP, SOB. (of note, pt refuses telephone  service and wished to have granddaughter translate).    MEDICATIONS  (STANDING):  aspirin enteric coated 81 milliGRAM(s) Oral daily  atorvastatin 80 milliGRAM(s) Oral at bedtime  cefTRIAXone   IVPB 1 Gram(s) IV Intermittent every 24 hours  dextrose 5%. 1000 milliLiter(s) (50 mL/Hr) IV Continuous <Continuous>  dextrose 50% Injectable 12.5 Gram(s) IV Push once  dextrose 50% Injectable 25 Gram(s) IV Push once  dextrose 50% Injectable 25 Gram(s) IV Push once  docusate sodium 100 milliGRAM(s) Oral daily  gabapentin 300 milliGRAM(s) Oral two times a day  heparin  Injectable 5000 Unit(s) SubCutaneous every 8 hours  insulin lispro (HumaLOG) corrective regimen sliding scale   SubCutaneous three times a day before meals  insulin lispro (HumaLOG) corrective regimen sliding scale   SubCutaneous at bedtime  isosorbide   mononitrate ER Tablet (IMDUR) 30 milliGRAM(s) Oral at bedtime  isosorbide   mononitrate ER Tablet (IMDUR) 60 milliGRAM(s) Oral daily  metoprolol tartrate 50 milliGRAM(s) Oral two times a day  metroNIDAZOLE  IVPB 500 milliGRAM(s) IV Intermittent every 8 hours  NIFEdipine XL 60 milliGRAM(s) Oral daily  senna 2 Tablet(s) Oral at bedtime    MEDICATIONS  (PRN):  dextrose 40% Gel 15 Gram(s) Oral once PRN Blood Glucose LESS THAN 70 milliGRAM(s)/deciliter  glucagon  Injectable 1 milliGRAM(s) IntraMuscular once PRN Glucose LESS THAN 70 milligrams/deciliter  HYDROmorphone  Injectable 0.5 milliGRAM(s) IV Push every 6 hours PRN Moderate Pain (4 - 6)  HYDROmorphone  Injectable 1 milliGRAM(s) IV Push every 8 hours PRN Severe Pain (7 - 10)        Allergies    ertapenem (Urticaria)  Purell (Rash)    Intolerances        REVIEW OF SYSTEMS:  CONSTITUTIONAL: No fever or chills  RESPIRATORY: No cough or shortness of breath  CARDIOVASCULAR: No chest pain  GASTROINTESTINAL: abd pain resolved, no vomiting or diarrhea  GENITOURINARY: No dysuria  NEUROLOGICAL: no focal weakness or dizziness    Vital Signs Last 24 Hrs  T(C): 36.6 (06 Jan 2019 12:36), Max: 37.6 (05 Jan 2019 19:58)  T(F): 97.8 (06 Jan 2019 12:36), Max: 99.6 (05 Jan 2019 19:58)  HR: 79 (06 Jan 2019 12:36) (76 - 89)  BP: 174/88 (06 Jan 2019 12:36) (114/69 - 174/88)  BP(mean): --  RR: 17 (06 Jan 2019 12:36) (16 - 18)  SpO2: 98% (06 Jan 2019 12:36) (95% - 100%)    PHYSICAL EXAM:  GENERAL: NAD at rest  HEENT:  anicteric, moist mucous membranes  CHEST/LUNG:  CTA b/l, no rales, wheezes, or rhonchi  HEART:  RRR, S1, S2  ABDOMEN:  BS+, soft, nontender, nondistended  EXTREMITIES: no edema, cyanosis, or calf tenderness  NERVOUS SYSTEM: answering questions and following commands     LABS:                          10.0   11.69 )-----------( 218      ( 06 Jan 2019 08:10 )             32.6     CBC Full  -  ( 06 Jan 2019 08:10 )  WBC Count : 11.69 K/uL  Hemoglobin : 10.0 g/dL  Hematocrit : 32.6 %  Platelet Count - Automated : 218 K/uL  Mean Cell Volume : 93.4 fl  Mean Cell Hemoglobin : 28.7 pg  Mean Cell Hemoglobin Concentration : 30.7 gm/dL  Auto Neutrophil # : 7.14 K/uL  Auto Lymphocyte # : 2.70 K/uL  Auto Monocyte # : 1.03 K/uL  Auto Eosinophil # : 0.71 K/uL  Auto Basophil # : 0.02 K/uL  Auto Neutrophil % : 61.0 %  Auto Lymphocyte % : 23.1 %  Auto Monocyte % : 8.8 %  Auto Eosinophil % : 6.1 %  Auto Basophil % : 0.2 %    01-06    137  |  100  |  15  ----------------------------<  147<H>  3.8   |  29  |  3.80<H>    Ca    7.5<L>      06 Jan 2019 08:10  Phos  3.1     01-06  Mg     1.9     01-06    TPro  6.0  /  Alb  1.1<L>  /  TBili  0.7  /  DBili  x   /  AST  54<H>  /  ALT  35  /  AlkPhos  409<H>  01-06            CAPILLARY BLOOD GLUCOSE      POCT Blood Glucose.: 137 mg/dL (06 Jan 2019 11:55)  POCT Blood Glucose.: 150 mg/dL (06 Jan 2019 07:46)        Culture - Blood (collected 01-04-19 @ 07:48)  Source: .Blood Blood-Venous  Preliminary Report (01-05-19 @ 08:01):    No growth to date.    Culture - Blood (collected 01-04-19 @ 07:48)  Source: .Blood Blood-Peripheral  Preliminary Report (01-05-19 @ 08:01):    No growth to date.    Culture - Blood (collected 01-03-19 @ 18:25)  Source: .Blood Blood-Peripheral  Preliminary Report (01-04-19 @ 19:01):    No growth to date.    Culture - Blood (collected 01-03-19 @ 18:25)  Source: .Blood Blood-Peripheral  Preliminary Report (01-04-19 @ 19:01):    No growth to date.    Culture - Blood (collected 01-02-19 @ 18:33)  Source: .Blood Blood-Peripheral  Gram Stain (01-03-19 @ 11:00):    Growth in aerobic bottle: Gram Negative Rods  Final Report (01-05-19 @ 12:16):    Growth in aerobic bottle: Klebsiella pneumoniae    See previous culture [id number]    Culture - Blood (collected 01-02-19 @ 18:32)  Source: .Blood Blood-Peripheral  Gram Stain (01-03-19 @ 15:40):    Growth in aerobic and anaerobic bottles: Gram Negative Rods  Final Report (01-05-19 @ 11:36):    Growth in aerobic and anaerobic bottles: Klebsiella pneumoniae    "Due to technical problems, Proteus sp. will Not be reported as part of    the BCID panel until further notice"    ***Blood Panel PCR results on this specimen are available    approximately 3 hours after the Gram stain result.***    Gram stain, PCR, and/or culture results may not always    correspond due to difference in methodologies.    ************************************************************    This PCR assay was performed using [employer].    The following targets are tested for: Enterococcus,    vancomycin resistant enterococci, Listeria monocytogenes,    coagulase negative staphylococci, S. aureus,    methicillin resistant S. aureus, Streptococcus agalactiae    (Group B), S. pneumoniae, S. pyogenes (Group A),    Acinetobacter baumannii, Enterobacter cloacae, E. coli,    Klebsiella oxytoca, K. pneumoniae, Proteus sp.,    Serratia marcescens, Haemophilus influenzae,    Neisseria meningitidis, Pseudomonas aeruginosa, Candida    albicans, C. glabrata, C krusei, C parapsilosis,    C. tropicalis and the KPC resistance gene.  Organism: Blood Culture PCR  Klebsiella pneumoniae (01-05-19 @ 11:36)  Organism: Klebsiella pneumoniae (01-05-19 @ 11:36)      -  Amikacin: S <=8      -  Ampicillin: R >16 These ampicillin results predict results for amoxicillin      -  Ampicillin/Sulbactam: R >16/8      -  Aztreonam: S <=4      -  Cefazolin: R 16      -  Cefepime: S <=2      -  Cefoxitin: S <=4      -  Ceftriaxone: S <=1 Enterobacter, Citrobacter, and Serratia may develop resistance during prolonged therapy      -  Ciprofloxacin: S <=0.5      -  Ertapenem: S <=0.5      -  Gentamicin: S <=1      -  Imipenem: S <=1      -  Levofloxacin: S <=1      -  Meropenem: S <=1      -  Piperacillin/Tazobactam: I 32      -  Tobramycin: S <=2      -  Trimethoprim/Sulfamethoxazole: S <=0.5/9.5      Method Type: KOREY  Organism: Blood Culture PCR (01-05-19 @ 11:36)      -  Klebsiella pneumoniae: Detec      Method Type: PCR        RADIOLOGY & ADDITIONAL TESTS:    Personally reviewed.     Consultant(s) Notes Reviewed:  [x] YES  [ ] NO Patient is a 68y old  Female who presents with a chief complaint of abd pain, vomiting.     INTERVAL HPI/OVERNIGHT EVENTS: Pt reports abd pain resolved. No vomiting. No fever, chills, CP, SOB. (of note, pt refuses telephone  service and wished to have granddaughter translate).    MEDICATIONS  (STANDING):  aspirin enteric coated 81 milliGRAM(s) Oral daily  atorvastatin 80 milliGRAM(s) Oral at bedtime  cefTRIAXone   IVPB 1 Gram(s) IV Intermittent every 24 hours  dextrose 5%. 1000 milliLiter(s) (50 mL/Hr) IV Continuous <Continuous>  dextrose 50% Injectable 12.5 Gram(s) IV Push once  dextrose 50% Injectable 25 Gram(s) IV Push once  dextrose 50% Injectable 25 Gram(s) IV Push once  docusate sodium 100 milliGRAM(s) Oral daily  gabapentin 300 milliGRAM(s) Oral two times a day  heparin  Injectable 5000 Unit(s) SubCutaneous every 8 hours  insulin lispro (HumaLOG) corrective regimen sliding scale   SubCutaneous three times a day before meals  insulin lispro (HumaLOG) corrective regimen sliding scale   SubCutaneous at bedtime  isosorbide   mononitrate ER Tablet (IMDUR) 30 milliGRAM(s) Oral at bedtime  isosorbide   mononitrate ER Tablet (IMDUR) 60 milliGRAM(s) Oral daily  metoprolol tartrate 50 milliGRAM(s) Oral two times a day  metroNIDAZOLE  IVPB 500 milliGRAM(s) IV Intermittent every 8 hours  NIFEdipine XL 60 milliGRAM(s) Oral daily  senna 2 Tablet(s) Oral at bedtime    MEDICATIONS  (PRN):  dextrose 40% Gel 15 Gram(s) Oral once PRN Blood Glucose LESS THAN 70 milliGRAM(s)/deciliter  glucagon  Injectable 1 milliGRAM(s) IntraMuscular once PRN Glucose LESS THAN 70 milligrams/deciliter  HYDROmorphone  Injectable 0.5 milliGRAM(s) IV Push every 6 hours PRN Moderate Pain (4 - 6)  HYDROmorphone  Injectable 1 milliGRAM(s) IV Push every 8 hours PRN Severe Pain (7 - 10)        Allergies    ertapenem (Urticaria)  Purell (Rash)    Intolerances        REVIEW OF SYSTEMS:  CONSTITUTIONAL: No fever or chills  RESPIRATORY: No cough or shortness of breath  CARDIOVASCULAR: No chest pain  GASTROINTESTINAL: abd pain resolved, no vomiting or diarrhea  GENITOURINARY: No dysuria  NEUROLOGICAL: no focal weakness or dizziness    Vital Signs Last 24 Hrs  T(C): 36.6 (06 Jan 2019 12:36), Max: 37.6 (05 Jan 2019 19:58)  T(F): 97.8 (06 Jan 2019 12:36), Max: 99.6 (05 Jan 2019 19:58)  HR: 79 (06 Jan 2019 12:36) (76 - 89)  BP: 174/88 (06 Jan 2019 12:36) (114/69 - 174/88)  BP(mean): --  RR: 17 (06 Jan 2019 12:36) (16 - 18)  SpO2: 98% (06 Jan 2019 12:36) (95% - 100%)    PHYSICAL EXAM:  GENERAL: NAD at rest  HEENT:  anicteric, moist mucous membranes  CHEST/LUNG:  CTA b/l, no rales, wheezes, or rhonchi  HEART:  RRR, S1, S2  ABDOMEN:  BS+, soft, nontender, nondistended  EXTREMITIES: no edema, cyanosis, or calf tenderness  NERVOUS SYSTEM: answering questions and following commands     LABS:                          10.0   11.69 )-----------( 218      ( 06 Jan 2019 08:10 )             32.6     CBC Full  -  ( 06 Jan 2019 08:10 )  WBC Count : 11.69 K/uL  Hemoglobin : 10.0 g/dL  Hematocrit : 32.6 %  Platelet Count - Automated : 218 K/uL  Mean Cell Volume : 93.4 fl  Mean Cell Hemoglobin : 28.7 pg  Mean Cell Hemoglobin Concentration : 30.7 gm/dL  Auto Neutrophil # : 7.14 K/uL  Auto Lymphocyte # : 2.70 K/uL  Auto Monocyte # : 1.03 K/uL  Auto Eosinophil # : 0.71 K/uL  Auto Basophil # : 0.02 K/uL  Auto Neutrophil % : 61.0 %  Auto Lymphocyte % : 23.1 %  Auto Monocyte % : 8.8 %  Auto Eosinophil % : 6.1 %  Auto Basophil % : 0.2 %    01-06    137  |  100  |  15  ----------------------------<  147<H>  3.8   |  29  |  3.80<H>    Ca    7.5<L>      06 Jan 2019 08:10  Phos  3.1     01-06  Mg     1.9     01-06    TPro  6.0  /  Alb  1.1<L>  /  TBili  0.7  /  DBili  x   /  AST  54<H>  /  ALT  35  /  AlkPhos  409<H>  01-06            CAPILLARY BLOOD GLUCOSE      POCT Blood Glucose.: 137 mg/dL (06 Jan 2019 11:55)  POCT Blood Glucose.: 150 mg/dL (06 Jan 2019 07:46)        Culture - Blood (collected 01-04-19 @ 07:48)  Source: .Blood Blood-Venous  Preliminary Report (01-05-19 @ 08:01):    No growth to date.    Culture - Blood (collected 01-04-19 @ 07:48)  Source: .Blood Blood-Peripheral  Preliminary Report (01-05-19 @ 08:01):    No growth to date.    Culture - Blood (collected 01-03-19 @ 18:25)  Source: .Blood Blood-Peripheral  Preliminary Report (01-04-19 @ 19:01):    No growth to date.    Culture - Blood (collected 01-03-19 @ 18:25)  Source: .Blood Blood-Peripheral  Preliminary Report (01-04-19 @ 19:01):    No growth to date.    Culture - Blood (collected 01-02-19 @ 18:33)  Source: .Blood Blood-Peripheral  Gram Stain (01-03-19 @ 11:00):    Growth in aerobic bottle: Gram Negative Rods  Final Report (01-05-19 @ 12:16):    Growth in aerobic bottle: Klebsiella pneumoniae    See previous culture [id number]    Culture - Blood (collected 01-02-19 @ 18:32)  Source: .Blood Blood-Peripheral  Gram Stain (01-03-19 @ 15:40):    Growth in aerobic and anaerobic bottles: Gram Negative Rods  Final Report (01-05-19 @ 11:36):    Growth in aerobic and anaerobic bottles: Klebsiella pneumoniae    "Due to technical problems, Proteus sp. will Not be reported as part of    the BCID panel until further notice"    ***Blood Panel PCR results on this specimen are available    approximately 3 hours after the Gram stain result.***    Gram stain, PCR, and/or culture results may not always    correspond due to difference in methodologies.    ************************************************************    This PCR assay was performed using [employer].    The following targets are tested for: Enterococcus,    vancomycin resistant enterococci, Listeria monocytogenes,    coagulase negative staphylococci, S. aureus,    methicillin resistant S. aureus, Streptococcus agalactiae    (Group B), S. pneumoniae, S. pyogenes (Group A),    Acinetobacter baumannii, Enterobacter cloacae, E. coli,    Klebsiella oxytoca, K. pneumoniae, Proteus sp.,    Serratia marcescens, Haemophilus influenzae,    Neisseria meningitidis, Pseudomonas aeruginosa, Candida    albicans, C. glabrata, C krusei, C parapsilosis,    C. tropicalis and the KPC resistance gene.  Organism: Blood Culture PCR  Klebsiella pneumoniae (01-05-19 @ 11:36)  Organism: Klebsiella pneumoniae (01-05-19 @ 11:36)      -  Amikacin: S <=8      -  Ampicillin: R >16 These ampicillin results predict results for amoxicillin      -  Ampicillin/Sulbactam: R >16/8      -  Aztreonam: S <=4      -  Cefazolin: R 16      -  Cefepime: S <=2      -  Cefoxitin: S <=4      -  Ceftriaxone: S <=1 Enterobacter, Citrobacter, and Serratia may develop resistance during prolonged therapy      -  Ciprofloxacin: S <=0.5      -  Ertapenem: S <=0.5      -  Gentamicin: S <=1      -  Imipenem: S <=1      -  Levofloxacin: S <=1      -  Meropenem: S <=1      -  Piperacillin/Tazobactam: I 32      -  Tobramycin: S <=2      -  Trimethoprim/Sulfamethoxazole: S <=0.5/9.5      Method Type: KOREY  Organism: Blood Culture PCR (01-05-19 @ 11:36)      -  Klebsiella pneumoniae: Detec      Method Type: PCR        RADIOLOGY & ADDITIONAL TESTS:    Personally reviewed.     Consultant(s) Notes Reviewed:  [x] YES  [ ] NO

## 2019-01-06 NOTE — PROGRESS NOTE ADULT - PROBLEM SELECTOR PLAN 3
Pt has PICC in place and course of Invanz/Tigecycline as outpt  -seems to be getting smaller on CT  -now will treat Klebsiella bacteremia with rocephin/flagyl

## 2019-01-06 NOTE — DISCHARGE NOTE ADULT - CARE PLAN
Principal Discharge DX:	Cholecystitis  Goal:	Improvement  Assessment and plan of treatment:	- You were evaluated by general surgery and HIDA scan was without evidence of acute cholecystitis, cholecystectomy was held until bacteremia was treated.  - Initial blood cultures were positive, you were treated with IV antibiotics, subsequent blood cultures showed no growth to date.  - Follow up with surgery and PMD as outpatient within 1 week of discharge for further management.  Secondary Diagnosis:	Liver abscess  Secondary Diagnosis:	ESRD (end stage renal disease) on dialysis  Secondary Diagnosis:	Hypertension  Secondary Diagnosis:	Diabetes Principal Discharge DX:	Cholecystitis  Goal:	Improvement  Assessment and plan of treatment:	- You were evaluated by general surgery and HIDA scan was without evidence of acute cholecystitis, cholecystectomy was held until bacteremia was treated.  - Initial blood cultures were positive, you were treated with IV antibiotics, subsequent blood cultures showed no growth to date.  - Follow up with surgery and PMD as outpatient within 1 week of discharge for further management.  Secondary Diagnosis:	Liver abscess  Assessment and plan of treatment:	You have received IV antibiotics through your PICC line, and your liver abscesses appear to be getting smaller on CT. Continue __  Secondary Diagnosis:	ESRD (end stage renal disease) on dialysis  Assessment and plan of treatment:	continue hemodialysis as directed.  Secondary Diagnosis:	Hypertension  Assessment and plan of treatment:	Continue metoprolol and nifedipine as directed.  Secondary Diagnosis:	Diabetes  Assessment and plan of treatment:	Continue Basaglar as directed, Principal Discharge DX:	Cholecystitis  Goal:	Improvement  Assessment and plan of treatment:	- You were admitted for acute cholecystitis and were evaluated by a general surgeon, Dr. Campos. It was decided that you would not benefit from having your gallbladder removed on this admission due to the presence of bacteria in your blood.  - Initial blood cultures were positive for a bacteria called Klebsiella, you were treated with IV antibiotics, subsequent blood cultures did not grow any bacteria.  - Follow up with surgery, Dr. Flores, and PMD as outpatient within 1 week of discharge for further management.  Secondary Diagnosis:	Liver abscess  Assessment and plan of treatment:	You have received IV antibiotics through your PICC line, and your liver abscesses appear to be getting smaller on CT. Continue __  Secondary Diagnosis:	ESRD (end stage renal disease) on dialysis  Assessment and plan of treatment:	continue hemodialysis as directed.  Secondary Diagnosis:	Hypertension  Assessment and plan of treatment:	Continue metoprolol and nifedipine as directed.  Secondary Diagnosis:	Diabetes  Assessment and plan of treatment:	Continue Basaglar as directed, Principal Discharge DX:	Cholecystitis  Goal:	Improvement  Assessment and plan of treatment:	- You were admitted for suspected acute on chronic cholecystitis, HIDA scan was found to be negative. You were evaluated by general surgery.   - Initial blood cultures were positive for a bacteria called Klebsiella, you were treated with IV antibiotics, subsequent blood cultures did not grow any bacteria.  - Follow up with your PMD as outpatient within 1 week of discharge for further management.  Secondary Diagnosis:	Liver abscess  Assessment and plan of treatment:	- You have received IV antibiotics through your PICC line, and your liver abscesses appeared to be getting smaller on CT.  - Follow up with your PMD as outpatient within 1 week of discharge for further management.  Secondary Diagnosis:	ESRD (end stage renal disease) on dialysis  Assessment and plan of treatment:	Continue hemodialysis as directed.  Secondary Diagnosis:	Hypertension  Assessment and plan of treatment:	Continue metoprolol and nifedipine as directed.  Secondary Diagnosis:	Diabetes  Assessment and plan of treatment:	Continue Basaglar as directed.  Secondary Diagnosis:	CAD (coronary artery disease)  Assessment and plan of treatment:	- Do not resume Plavix, per cardiology's recommendations.  - Follow up with as outpatient with your cardiologist within 1 week of discharge regarding taking Plavix and for further management. Principal Discharge DX:	Cholecystitis  Goal:	Improvement of symptoms  Assessment and plan of treatment:	- You were admitted for suspected acute on chronic cholecystitis, HIDA scan was found to be negative. You were evaluated by general surgery.   - Initial blood cultures were positive for a bacteria called Klebsiella, you were treated with IV antibiotics, subsequent blood cultures did not grow any bacteria.  - Follow up with your PMD as outpatient within 1 week of discharge for further management.  Secondary Diagnosis:	Liver abscess  Assessment and plan of treatment:	- You have received IV antibiotics through your PICC line, and your liver abscesses appeared to be getting smaller on CT.  - Follow up with your PMD as outpatient within 1 week of discharge for further management.  Secondary Diagnosis:	ESRD (end stage renal disease) on dialysis  Assessment and plan of treatment:	Continue hemodialysis as directed. Follow up with your nephrologist as outpatient.  Secondary Diagnosis:	Hypertension  Assessment and plan of treatment:	Continue metoprolol and nifedipine as directed.  Secondary Diagnosis:	Diabetes  Assessment and plan of treatment:	Continue Basaglar as directed.  Secondary Diagnosis:	CAD (coronary artery disease)  Assessment and plan of treatment:	- Do not resume Plavix, per cardiology's recommendations.  - Follow up with as outpatient with your cardiologist within 1 week of discharge regarding taking Plavix and for further management. Principal Discharge DX:	Cholecystitis  Goal:	Improvement of symptoms  Assessment and plan of treatment:	- You were admitted for suspected acute on chronic cholecystitis, HIDA scan was found to be negative. You were evaluated by general surgery who recommended outpatient follow up.   - Initial blood cultures were positive for a bacteria called Klebsiella, you were treated with IV antibiotics, subsequent blood cultures did not grow any bacteria. You should continue with oral Ciprofloxacin 250mg daily at bedtime and Flagyl 500mg every 8hours until 1/17/19.   - Follow up with your PMD as outpatient within 1 week of discharge for further management.  Secondary Diagnosis:	Liver abscess  Assessment and plan of treatment:	- You have received IV antibiotics through your PICC line, and your liver abscesses appeared to be getting smaller on CT.  - Follow up with your PMD as outpatient within 1 week of discharge for further management.  Secondary Diagnosis:	ESRD (end stage renal disease) on dialysis  Assessment and plan of treatment:	Continue hemodialysis as directed. Follow up with your nephrologist as outpatient.  Secondary Diagnosis:	Hypertension  Assessment and plan of treatment:	Continue metoprolol and nifedipine as directed.  Secondary Diagnosis:	Diabetes  Assessment and plan of treatment:	Continue Basaglar as directed.  Secondary Diagnosis:	CAD (coronary artery disease)  Assessment and plan of treatment:	- Do not resume Plavix, per cardiology's recommendations.  - Follow up with as outpatient with your cardiologist within 1 week of discharge regarding taking Plavix and for further management.

## 2019-01-06 NOTE — PROGRESS NOTE ADULT - PROBLEM SELECTOR PLAN 1
acute on chronic  hida noted, neg for acute kevin  bld cxs growing gnr  currently on ivf/clears  cont abx per id  further plans as per surgery  trend lfts  cbd stent still in place, will need eventual removal  will follow  surgery on case to see whren oportune time is to operate

## 2019-01-06 NOTE — PROGRESS NOTE ADULT - SUBJECTIVE AND OBJECTIVE BOX
pt seen  feeling better  no complaints  ICU Vital Signs Last 24 Hrs  T(C): 36.7 (06 Jan 2019 08:11), Max: 37.6 (05 Jan 2019 19:58)  T(F): 98.1 (06 Jan 2019 08:11), Max: 99.6 (05 Jan 2019 19:58)  HR: 76 (06 Jan 2019 08:11) (76 - 89)  BP: 172/82 (06 Jan 2019 08:11) (114/69 - 174/80)  BP(mean): --  ABP: --  ABP(mean): --  RR: 18 (06 Jan 2019 08:11) (16 - 18)  SpO2: 99% (06 Jan 2019 08:11) (95% - 100%)  gen-NAD  resp-clear  abd-soft, mild epigastric/ruq pain                          10.0   11.69 )-----------( 218      ( 06 Jan 2019 08:10 )             32.6   01-06    137  |  100  |  15  ----------------------------<  147<H>  3.8   |  29  |  3.80<H>    Ca    7.5<L>      06 Jan 2019 08:10    TPro  6.0  /  Alb  1.1<L>  /  TBili  0.7  /  DBili  x   /  AST  54<H>  /  ALT  35  /  AlkPhos  409<H>  01-06

## 2019-01-06 NOTE — PROGRESS NOTE ADULT - ASSESSMENT
68 female with a history of ESRD on HD and abdominal pain. Being treated for sepsis and is being evaluated for drainage of hepatic abscess and possible surgery. will follow.

## 2019-01-06 NOTE — DISCHARGE NOTE ADULT - MEDICATION SUMMARY - MEDICATIONS TO STOP TAKING
I will STOP taking the medications listed below when I get home from the hospital:    clopidogrel 75 mg oral tablet  -- 1 tab(s) by mouth once a day I will STOP taking the medications listed below when I get home from the hospital:  None

## 2019-01-06 NOTE — PROGRESS NOTE ADULT - PROBLEM SELECTOR PLAN 2
suspect acute on chronic per CT and symptoms on admission --- HIDA r/out acute kevin at the time of the scan so perhaps pt had obstruction that cleared by time of HIDA  - Continue abx as per ID  - Per Gen Surg (Dr. Campos), will hold off on cholecystectomy for now and reconsider it Monday/Tuesday  - Per GI (Dr. Camacho), CBD stent still in place and will need to be removed after lap kevin.

## 2019-01-06 NOTE — DISCHARGE NOTE ADULT - PROVIDER TOKENS
TOKEN:'[id number]',FREE:[LAST:[Chris],FIRST:[Primary Medical Doctor],PHONE:[(   )    -],FAX:[(   )    -]],FREE:[LAST:[Black],FIRST:[Mindar],PHONE:[(   )    -],FAX:[(   )    -]] TOKEN:'[id number]',FREE:[LAST:[Chris],FIRST:[Primary Medical Doctor],PHONE:[(   )    -],FAX:[(   )    -]],FREE:[LAST:[Black],FIRST:[Mindar],PHONE:[(   )    -],FAX:[(   )    -]],TOKEN:'[id number]'

## 2019-01-06 NOTE — PROGRESS NOTE ADULT - SUBJECTIVE AND OBJECTIVE BOX
[facility], Division of Infectious Diseases  DEB Barbosa A. Lee  [phone]    Name: IRENE TAYLOR  Age: 68y  Gender: Female  MRN: [id number]    Interval History--  Notes reviewed  states abd pain is better.  She does not like the food here.  The apple juice gives her diarrhea.    Past Medical History--  Cholecystitis with cholangitis  Acute urinary retention  Liver abscess  ESRD (end stage renal disease) on dialysis  CAD (coronary artery disease)  Diabetes  CRF (chronic renal failure)  Hypertension  Pneumonia  Myocardial infarction  Hypertension  Diabetes  H/O: hysterectomy      For details regarding the patient's social history, family history, and other miscellaneous elements, please refer the initial infectious diseases consultation and/or the admitting history and physical examination for this admission.    Allergies    ertapenem (Urticaria)  Purell (Rash)    Intolerances        Medications--  Antibiotics:  piperacillin/tazobactam IVPB. 3.375 Gram(s) IV Intermittent every 12 hours    Immunologic:    Other:  aspirin enteric coated  atorvastatin  dextrose 40% Gel PRN  dextrose 5%.  dextrose 50% Injectable  dextrose 50% Injectable  dextrose 50% Injectable  docusate sodium  gabapentin  glucagon  Injectable PRN  heparin  Injectable  HYDROmorphone  Injectable PRN  HYDROmorphone  Injectable PRN  insulin lispro (HumaLOG) corrective regimen sliding scale  insulin lispro (HumaLOG) corrective regimen sliding scale  isosorbide   mononitrate ER Tablet (IMDUR)  isosorbide   mononitrate ER Tablet (IMDUR)  metoprolol tartrate  NIFEdipine XL  senna      Review of Systems--  A 10-point review of systems was obtained.     Pertinent positives and negatives--  Constitutional: No fevers. No Chills. No Rigors.   Cardiovascular: No chest pain. No palpitations.  Respiratory: No shortness of breath. No cough.  Gastrointestinal: No nausea or vomiting. No diarrhea or constipation.   Psychiatric: + depression    Review of systems otherwise negative except as previously noted.    Physical Examination--  Vital Signs: T(F): 97.8 (01-06-19 @ 12:36), Max: 99.6 (01-05-19 @ 19:58)  HR: 79 (01-06-19 @ 12:36)  BP: 174/88 (01-06-19 @ 12:36)  RR: 17 (01-06-19 @ 12:36)  SpO2: 98% (01-06-19 @ 12:36)  Wt(kg): --  General: Nontoxic-appearing Female in no acute distress.  HEENT: AT/NC.  Anicteric. Conjunctiva pink and moist. Oropharynx clear. Dentition fair.  Neck: Not rigid. No sense of mass.  Nodes: None palpable.  Lungs: Clear bilaterally without rales, wheezing or rhonchi  Heart: Regular rate and rhythm. No Murmur. No rub. No gallop. No palpable thrill.  Abdomen: Bowel sounds present and normoactive. Soft. Nondistended. Nontender.   Back: No spinal tenderness. No costovertebral angle tenderness.   Extremities: No cyanosis or clubbing. ++ edema.   Skin: Warm. Dry. Good turgor. No rash. No vasculitic stigmata.  Psychiatric: Appropriate affect and mood for situation.         Laboratory Studies--  CBC                        10.0   11.69 )-----------( 218      ( 06 Jan 2019 08:10 )             32.6       Chemistries  01-06    137  |  100  |  15  ----------------------------<  147<H>  3.8   |  29  |  3.80<H>    Ca    7.5<L>      06 Jan 2019 08:10  Phos  3.1     01-06  Mg     1.9     01-06    TPro  6.0  /  Alb  1.1<L>  /  TBili  0.7  /  DBili  x   /  AST  54<H>  /  ALT  35  /  AlkPhos  409<H>  01-06      Culture Data    Culture - Blood (collected 04 Jan 2019 07:48)  Source: .Blood Blood-Venous  Preliminary Report (05 Jan 2019 08:01):    No growth to date.    Culture - Blood (collected 04 Jan 2019 07:48)  Source: .Blood Blood-Peripheral  Preliminary Report (05 Jan 2019 08:01):    No growth to date.    Culture - Blood (collected 03 Jan 2019 18:25)  Source: .Blood Blood-Peripheral  Preliminary Report (04 Jan 2019 19:01):    No growth to date.    Culture - Blood (collected 03 Jan 2019 18:25)  Source: .Blood Blood-Peripheral  Preliminary Report (04 Jan 2019 19:01):    No growth to date.    Culture - Blood (collected 02 Jan 2019 18:33)  Source: .Blood Blood-Peripheral  Gram Stain (03 Jan 2019 11:00):    Growth in aerobic bottle: Gram Negative Rods  Final Report (05 Jan 2019 12:16):    Growth in aerobic bottle: Klebsiella pneumoniae    See previous culture [id number]    Culture - Blood (collected 02 Jan 2019 18:32)  Source: .Blood Blood-Peripheral  Gram Stain (03 Jan 2019 15:40):    Growth in aerobic and anaerobic bottles: Gram Negative Rods  Final Report (05 Jan 2019 11:36):    Growth in aerobic and anaerobic bottles: Klebsiella pneumoniae    "Due to technical problems, Proteus sp. will Not be reported as part of    the BCID panel until further notice"    ***Blood Panel PCR results on this specimen are available    approximately 3 hours after the Gram stain result.***    Gram stain, PCR, and/or culture results may not always    correspond due to difference in methodologies.    ************************************************************    This PCR assay was performed using [employer].    The following targets are tested for: Enterococcus,    vancomycin resistant enterococci, Listeria monocytogenes,    coagulase negative staphylococci, S. aureus,    methicillin resistant S. aureus, Streptococcus agalactiae    (Group B), S. pneumoniae, S. pyogenes (Group A),    Acinetobacter baumannii, Enterobacter cloacae, E. coli,    Klebsiella oxytoca, K. pneumoniae, Proteus sp.,    Serratia marcescens, Haemophilus influenzae,    Neisseria meningitidis, Pseudomonas aeruginosa, Candida    albicans, C. glabrata, C krusei, C parapsilosis,    C. tropicalis and the KPC resistance gene.  Organism: Blood Culture PCR  Klebsiella pneumoniae (05 Jan 2019 11:36)  Organism: Klebsiella pneumoniae (05 Jan 2019 11:36)  Organism: Blood Culture PCR (05 Jan 2019 11:36)        < from: Xray Abdomen 2 Views (01.02.19 @ 20:02) >    EXAM:  XR ABDOMEN 2V                            PROCEDURE DATE:  01/02/2019          INTERPRETATION:  Abdominal pain, rule out perforation.    AP spine upright. Prior 11/15/2018.    Nonspecific nonobstructive bowel gas pattern. No free air. No opaque   calculi. Vascular calcification. Biliary stent projects over the   midabdomen.    Impression: No obstruction or free air.    < end of copied text >

## 2019-01-06 NOTE — PROGRESS NOTE ADULT - SUBJECTIVE AND OBJECTIVE BOX
[facility] -- Glynn Bullock, Susan, Flaquita Taylor Patel, Savella  Office # [phone]    Follow Up:  Elevated troponin    Subjective/Observations: Awake and alert, comfortable on RA.  Denies CP or palpitations.  c/o abdominal pain and nausea after a meal, otherwise, no vomiting.    REVIEW OF SYSTEMS: All other review of systems is negative unless indicated above    PAST MEDICAL & SURGICAL HISTORY:  Cholecystitis with cholangitis  Acute urinary retention  Liver abscess  ESRD (end stage renal disease) on dialysis: F  CAD (coronary artery disease): s/p stent in 2007  Diabetes  Myocardial infarction  Hypertension  H/O: hysterectomy    MEDICATIONS  (STANDING):  aspirin enteric coated 81 milliGRAM(s) Oral daily  atorvastatin 80 milliGRAM(s) Oral at bedtime  dextrose 5% + sodium chloride 0.45%. 1000 milliLiter(s) (60 mL/Hr) IV Continuous <Continuous>  dextrose 5%. 1000 milliLiter(s) (50 mL/Hr) IV Continuous <Continuous>  dextrose 50% Injectable 12.5 Gram(s) IV Push once  dextrose 50% Injectable 25 Gram(s) IV Push once  dextrose 50% Injectable 25 Gram(s) IV Push once  docusate sodium 100 milliGRAM(s) Oral daily  gabapentin 300 milliGRAM(s) Oral two times a day  heparin  Injectable 5000 Unit(s) SubCutaneous every 8 hours  insulin lispro (HumaLOG) corrective regimen sliding scale   SubCutaneous three times a day before meals  insulin lispro (HumaLOG) corrective regimen sliding scale   SubCutaneous at bedtime  isosorbide   mononitrate ER Tablet (IMDUR) 30 milliGRAM(s) Oral at bedtime  isosorbide   mononitrate ER Tablet (IMDUR) 60 milliGRAM(s) Oral daily  metoprolol tartrate 50 milliGRAM(s) Oral two times a day  NIFEdipine XL 60 milliGRAM(s) Oral daily  piperacillin/tazobactam IVPB. 3.375 Gram(s) IV Intermittent every 12 hours  senna 2 Tablet(s) Oral at bedtime  sodium chloride 0.9%. 1000 milliLiter(s) (50 mL/Hr) IV Continuous <Continuous>    MEDICATIONS  (PRN):  dextrose 40% Gel 15 Gram(s) Oral once PRN Blood Glucose LESS THAN 70 milliGRAM(s)/deciliter  glucagon  Injectable 1 milliGRAM(s) IntraMuscular once PRN Glucose LESS THAN 70 milligrams/deciliter  HYDROmorphone  Injectable 0.5 milliGRAM(s) IV Push every 6 hours PRN Moderate Pain (4 - 6)  HYDROmorphone  Injectable 1 milliGRAM(s) IV Push every 8 hours PRN Severe Pain (7 - 10)    Allergies    ertapenem (Urticaria)  Purell (Rash)    Intolerances    Vital Signs Last 24 Hrs  T(C): 36.7 (06 Jan 2019 08:11), Max: 37.6 (05 Jan 2019 19:58)  T(F): 98.1 (06 Jan 2019 08:11), Max: 99.6 (05 Jan 2019 19:58)  HR: 76 (06 Jan 2019 08:11) (76 - 89)  BP: 172/82 (06 Jan 2019 08:11) (114/69 - 174/80)  BP(mean): --  RR: 18 (06 Jan 2019 08:11) (16 - 18)  SpO2: 99% (06 Jan 2019 08:11) (94% - 100%)    I&O's Summary    05 Jan 2019 07:01  -  06 Jan 2019 07:00  --------------------------------------------------------  IN: 340 mL / OUT: 1800 mL / NET: -1460 mL    PHYSICAL EXAM:  TELE: NSR  Constitutional: NAD, awake and alert, obese  HEENT: Moist Mucous Membranes, Anicteric  Pulmonary: Non-labored, breath sounds are clear bilaterally, No wheezing, rales or rhonchi  Cardiovascular: Regular, S1 and S2, No murmurs, rubs, gallops or clicks  Gastrointestinal: Bowel Sounds present, soft, nontender.   Lymph: No peripheral edema. No lymphadenopathy.  Skin: No visible rashes or ulcers.  Psych:  Mood & affect appropriate    LABS: All Labs Reviewed:                        10.0   11.69 )-----------( 218      ( 06 Jan 2019 08:10 )             32.6                         10.9   14.66 )-----------( 224      ( 05 Jan 2019 06:17 )             35.2                         10.0   12.71 )-----------( 173      ( 04 Jan 2019 06:43 )             31.4     06 Jan 2019 08:10    137    |  100    |  15     ----------------------------<  147    3.8     |  29     |  3.80   05 Jan 2019 06:17    135    |  97     |  31     ----------------------------<  151    4.2     |  28     |  5.40   04 Jan 2019 06:43    136    |  100    |  22     ----------------------------<  125    3.9     |  29     |  3.90     Ca    7.5        06 Jan 2019 08:10  Ca    7.8        05 Jan 2019 06:17  Ca    7.2        04 Jan 2019 06:43    TPro  6.0    /  Alb  1.1    /  TBili  0.7    /  DBili  x      /  AST  54     /  ALT  35     /  AlkPhos  409    06 Jan 2019 08:10  TPro  6.4    /  Alb  1.2    /  TBili  0.8    /  DBili  .60    /  AST  60     /  ALT  39     /  AlkPhos  380    05 Jan 2019 06:17    < from: TTE Echo Doppler w/o Cont (05.04.18 @ 20:56) >     EXAM:  ECHO TTE W/O CON COMP W/DOPPLR       PROCEDURE DATE:  05/04/2018      INTERPRETATION:  Height 165cm. weight 95kg. blood pressure 129/81.   Technician TE. Indication for study dyspnea.    Aortic root 2.3 cm left atrium 4.4 cm left ventricular end-diastolic   diameter 5.7 cm left ventricular end-systolic diameter 4.3 cm septal wall   thickness 1.2 cm posterior wall thickness 1.2 cm visually equivocally   estimated ejection fraction 50-55%.    The aortic root is calcified and of normaldiameter. 3 distinct leaflets   of the aortic valve are not well demonstrated by this study. The   technician was unable to identify any significant gradient across this   valve to suggest hemodynamically significant aortic stenosis. Left atrium   isenlarged. The left ventricle was difficult to visualize by all   standard echocardiographic windows. Possibly the end-diastolic diameter   is mildly increased. The wall thickness is borderline increased. Any   significant focal wall motion abnormality cannot be ascertained by this   study. Visually estimated ejection fraction 50-55%. The mitral annulus is   calcified. The mitral valve leaflets are mildly thickened with a normal   EF slope and excursion. There is no evidence for hemodynamically   significant mitral stenosis. On the very limited color flow Doppler   portion examination mild mitral regurgitation suggested.    Conclusion:  1. Technically difficult limited supine study. Please note that this is a   portable study and the study is also limited due to patient's body   habitus.  2. 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  3. Enlarged left atrium with associated mild mitral regurgitation.  4. Very limited visualization left ventricle which may represent mild   left ventricular concentric hypertrophy with a well-preserved ejection   fraction as described above.  5. A very small posterior pericardial effusion is suggested but not   diagnostic.  6. Correlate these limited findings clinically.    SALVADOR PHILLIPS M.D., ATTENDING CARDIOLOGIST  This document has been electronically signed. May  5 2018  9:47AM      < end of copied text >               Lenore Long NP  Cardiology

## 2019-01-06 NOTE — PROGRESS NOTE ADULT - PROBLEM SELECTOR PLAN 1
Pt met sepsis criteria (fever, leukocytosis, source) on admission, suspect 2/2 acute on chronic cholecystitis   - CT Chest/Abd/Pel showed possible acute kevin on chronic kevin, decreased appearance of liver abscess - small abscess remains.  - Lactate downtrended to wnl.  - Continue pain regimen and Tylenol PRN for fever.  - leukocytosis improving  - BCx positive for Klebsiella -- repeat cultures negative so far   - on IV Zosyn cultures cleared, but sensitivities coming back as intermediate to zosyn so switched Abx to rocephin and flagyl today as per ID recs (the flagyl given since pt had liver abscess and in those situations broader coverage recommended)

## 2019-01-06 NOTE — DISCHARGE NOTE ADULT - SECONDARY DIAGNOSIS.
Liver abscess ESRD (end stage renal disease) on dialysis Hypertension Diabetes CAD (coronary artery disease)

## 2019-01-06 NOTE — DISCHARGE NOTE ADULT - HOSPITAL COURSE
69 yo F with PMH of ESRD on HD (M, W, F), CAD s/p stents 2007, DM, HTN, MI, biliary stent and liver abscess (with PICC placed on previous admission for treatment with Tigecycline) presented to the ED with 2 days of abdominal pain and 4 episodes of vomiting. In the ED, patient was febrile and labs were significant for WBC 17.5 with left shift, bandemia 13, initial lactate 4.0, repeat lactate 2.8 s/p 1.5L NS, BUN/Cr 52/6, Alk phos 353, AST 54, Albumin 1.3, Corrected Ca 9.6. Patient was admitted for sepsis 2/2 acute on chronic cholecystitis vs failed treatment of liver abscess. Patient was evaluated by general surgery (Dr. Campos), GI (Dr. Tate), ID (Dr. Solis), cardiology (Dr. Bullock's group) and nephrology (Dr. Albarran). CT Chest/Abd/Pelvis showed possible acute kevin on chronic kevin, decreased conspicousity of liver abscess - small abscess remains. Lactate downtrended to wnl. Troponin was initially mildly elevated, third set was wnl. Blood cultures were positive for GNR (Klebsiella pneumoniae) and patient received IV Zosyn. 2 subsequent sets of blood cultures showed NGTD. HIDA scan was normal, without evidence of acute cholecystitis, and cholecystectomy was deferred pending treatment of bacteremia. 69 yo F with PMH of ESRD on HD (M, W, F), CAD s/p stents 2007, DM, HTN, MI, biliary stent and liver abscess (with PICC placed on previous admission for treatment with Tigecycline) presented to the ED with 2 days of abdominal pain and 4 episodes of vomiting. In the ED, patient was febrile and labs were significant for WBC 17.5 with left shift, bandemia 13, initial lactate 4.0, repeat lactate 2.8 s/p 1.5L NS, BUN/Cr 52/6, Alk phos 353, AST 54, Albumin 1.3, Corrected Ca 9.6. Patient was admitted for sepsis 2/2 acute on chronic cholecystitis vs failed treatment of liver abscess. Patient was evaluated by general surgery (Dr. Campos), GI (Dr. Tate), ID (Dr. Solis), cardiology (Dr. Bullock's group) and nephrology (Dr. Albarran). CT Chest/Abd/Pelvis showed possible acute kevin on chronic kevin, decreased conspicousity of liver abscess - small abscess remains. Lactate downtrended to wnl. Troponin was initially mildly elevated, third set was wnl. Blood cultures were positive for GNR (Klebsiella pneumoniae) and patient received IV Zosyn. 2 subsequent sets of blood cultures showed NGTD. HIDA scan was normal, without evidence of acute cholecystitis, and cholecystectomy was deferred pending treatment of bacteremia and improvement in hypoalbuminemia. 69 yo F with PMH of ESRD on HD (M, W, F), CAD s/p stents 2007, DM, HTN, MI, biliary stent and liver abscess (with PICC placed on previous admission for treatment with Tigecycline) presented to the ED with 2 days of abdominal pain and 4 episodes of vomiting. In the ED, patient was febrile and labs were significant for WBC 17.5 with left shift, bandemia 13, initial lactate 4.0, repeat lactate 2.8 s/p 1.5L NS, BUN/Cr 52/6, Alk phos 353, AST 54, Albumin 1.3, Corrected Ca 9.6. Patient was admitted for sepsis 2/2 acute on chronic cholecystitis vs failed treatment of liver abscess. Patient was evaluated by general surgery (Dr. Campos), GI (Dr. Tate), ID (Dr. Solis), cardiology (Dr. Bullock's group) and nephrology (Dr. Albarran). CT Chest/Abd/Pelvis showed possible acute kevin on chronic kevin, decreased conspicousity of liver abscess - small abscess remains. Lactate downtrended to wnl. Troponin was initially mildly elevated, third set was wnl. Blood cultures were positive for GNR (Klebsiella pneumoniae) and patient received IV Zosyn. 2 subsequent sets of blood cultures showed NGTD. HIDA scan was normal, without evidence of acute cholecystitis, and cholecystectomy was deferred by general surgery for treatment of bacteremia and improvement in hypoalbuminemia. Patient received IV Rocephin and IV Flagyl from 1/6 to 1/10, after which she was switched to PO Cipro/Flagyl, per ID recommendations. Patient had diarrhea and stool was tested, GI PCR and C difficile PCR were negative, and she was started on Cholestyramine and PRN Pepto Bismol by GI.    Patient responded well to medical treatment during hospitalization. Patient was seen and examined on the day of discharge and is medically optimized for discharge, with close outpatient follow up. 69 yo F with PMH of ESRD on HD (M, W, F), CAD s/p stents 2007, DM, HTN, MI, biliary stent and liver abscess (with PICC placed on previous admission for treatment with Tigecycline) presented to the ED with 2 days of abdominal pain and 4 episodes of vomiting. In the ED, patient was febrile and labs were significant for WBC 17.5 with left shift, bandemia 13, initial lactate 4.0, repeat lactate 2.8 s/p 1.5L NS, BUN/Cr 52/6, Alk phos 353, AST 54, Albumin 1.3, Corrected Ca 9.6. Patient was admitted for sepsis 2/2 acute on chronic cholecystitis vs failed treatment of liver abscess. Patient was evaluated by general surgery (Dr. Campos), GI (Dr. Tate), ID (Dr. Solis), cardiology (Dr. Bullock's group) and nephrology (Dr. Albarran). CT Chest/Abd/Pelvis showed possible acute kevin on chronic kevin, decreased conspicousity of liver abscess - small abscess remains. Lactate downtrended to wnl. Troponin was initially mildly elevated, third set was wnl. Blood cultures were positive for GNR (Klebsiella pneumoniae) and patient received IV Zosyn. 2 subsequent sets of blood cultures showed NGTD. HIDA scan was normal, without evidence of acute cholecystitis, and cholecystectomy was deferred by general surgery for treatment of bacteremia and improvement in hypoalbuminemia. Patient received IV Rocephin and IV Flagyl from 1/6 to 1/10, after which she was switched to PO Cipro/Flagyl, per ID recommendations. Patient had diarrhea and stool was tested, GI PCR and C difficile PCR were negative, and she was started on Cholestyramine and PRN Pepto Bismol by GI.    Patient responded well to medical treatment during hospitalization. Patient was seen and examined on the day of discharge and is medically optimized for discharge, with close outpatient follow up. She will be discharged to [facility]. 67 yo F with PMH of ESRD on HD (M, W, F), CAD s/p stents 2007, DM, HTN, MI, biliary stent and liver abscess (with PICC placed on previous admission for treatment with Tigecycline) presented to the ED with 2 days of abdominal pain and 4 episodes of vomiting. In the ED, patient was febrile and labs were significant for WBC 17.5 with left shift, bandemia 13, initial lactate 4.0, repeat lactate 2.8 s/p 1.5L NS, BUN/Cr 52/6, Alk phos 353, AST 54, Albumin 1.3, Corrected Ca 9.6. Patient was admitted for sepsis 2/2 acute on chronic cholecystitis vs failed treatment of liver abscess. Patient was evaluated by general surgery (Dr. Campos), GI (Dr. Tate), ID (Dr. Solis), cardiology (Dr. Bullock's group) and nephrology (Dr. Albarran). CT Chest/Abd/Pelvis showed possible acute kevin on chronic kevin, decreased conspicousity of liver abscess - small abscess remains. Lactate downtrended to wnl. Troponin was initially mildly elevated, third set was wnl. Blood cultures were positive for GNR (Klebsiella pneumoniae) and patient received IV Zosyn. 2 subsequent sets of blood cultures showed NGTD. HIDA scan was normal, without evidence of acute cholecystitis, and cholecystectomy was deferred by general surgery for treatment of bacteremia and improvement in hypoalbuminemia. Patient received IV Rocephin and IV Flagyl from 1/6 to 1/10, after which she was switched to PO Cipro/Flagyl, per ID recommendations. Patient had diarrhea and stool was tested, GI PCR and C difficile PCR were negative, and she was started on Cholestyramine and PRN Pepto Bismol by GI.    Patient responded well to medical treatment during hospitalization. Patient was seen and examined on the day of discharge and is medically optimized for discharge, with close outpatient follow up. She will be discharged to [facility].  PHYSICAL EXAM    Constitutional: NAD, well-groomed, well-developed  HEENT: PERRLA, EOMI, Normal Hearing, MMM  Neck: No LAD, No JVD  Back: Normal spine flexure, No CVA tenderness  Respiratory: CTAB/L   Cardiovascular: S1 and S2, RRR, no M/G/R  Gastrointestinal: BS+, soft, NT/ND  Extremities: No peripheral edema  Vascular: 2+ peripheral pulses  Neurological: A/O x 3, no focal deficits  Skin: No rashes  i spent 45 min and notified pcp

## 2019-01-06 NOTE — PROGRESS NOTE ADULT - ASSESSMENT
69 yo F with PMH of ESRD on HD (M, W, F) via permacath started in June 2018, CAD s/p 1 stent 2007, DM type 2, HTN, MI, biliary stent, Liver abscess, obesity who presents to the ED with abdominal pain x2 days, 4 episodes of vomiting. Currently admitted for sepsis 2/2 acute on chronic cholecystitis vs failed treatment of liver abscess. Patient had recent admission at [facility] November 2018 for acute cholecystitis causing sepsis, S/P Perc Noa 10/30 and ESBL E. coli bacteremia. She was treated with IV ertapenem and developed A fIb with RVR. ERCP was attempted at [facility] but was unsuccessful and was transferred to [facility] where ERCP was performed with stone extraction and stent placement. She also had another admission in November 2018 at [place] shortly after for acute pancreatitis. Patient has been in rehab s/p last discharge from [facility] [place]. Overnight with several episodes of vomiting.    - No clear evidence of acute ischemia.  Monitor closely for the development of anginal symptoms or clinical signs of ischemia as patient has history of CAD s/p stent  - Continue asa, Statin, imdur with hx of CAD s/p 1 stent in 2007. Has been on dual antiplatelet therapy since. Outpatient cardiologist is Dr Rachel Black in [place].   - Continue to hold Plavix for possible surgical intervention; please resume when cleared by surgery  - Decision for surgery is still pending.    - No acute changes on EKG compared to previous.  - No meaningful evidence of volume overload.  No significant cardiac murmurs and no cardiac arrhythmias per tele.  Patient is a moderate risk and optimized for a non-cardiac procedure.  Please continue routine hemodynamics  - BP well controlled.  Continue metoprolol and procardia XL  - Monitor and replete lytes, keep K>4, Mg>2.    - Other cardiovascular workup will depend on clinical course.  - All other workup per primary team.  - Will continue to follow.    Lenore Long NP  Cardiology 69 yo F with PMH of ESRD on HD (M, W, F) via permacath started in June 2018, CAD s/p 1 stent 2007, DM type 2, HTN, MI, biliary stent, Liver abscess, obesity who presents to the ED with abdominal pain x2 days, 4 episodes of vomiting. Currently admitted for sepsis 2/2 acute on chronic cholecystitis vs failed treatment of liver abscess. Patient had recent admission at [facility] November 2018 for acute cholecystitis causing sepsis, S/P Perc Noa 10/30 and ESBL E. coli bacteremia. She was treated with IV ertapenem and developed A fIb with RVR. ERCP was attempted at [facility] but was unsuccessful and was transferred to [facility] where ERCP was performed with stone extraction and stent placement. She also had another admission in November 2018 at [place] shortly after for acute pancreatitis. Patient has been in rehab s/p last discharge from [facility]. Overnight with several episodes of vomiting.    - No clear evidence of acute ischemia.  Monitor closely for the development of anginal symptoms or clinical signs of ischemia as patient has history of CAD s/p stent  - Continue asa, Statin, imdur with hx of CAD s/p 1 stent in 2007. Has been on dual antiplatelet therapy since. Outpatient cardiologist is Dr Rachel Black in [place].   - Continue to hold Plavix for possible surgical intervention; please resume when cleared by surgery  - Decision for surgery is still pending.    - No acute changes on EKG compared to previous.  - No meaningful evidence of volume overload.  No significant cardiac murmurs and no cardiac arrhythmias per tele.  Patient is a moderate risk and optimized for a non-cardiac procedure.  Please continue routine hemodynamics  - BP labile, likely due to abdominal pain.  If persists, may need to increase Procardia XL.  Continue metoprolol and procardia XL at same dose for now  - Monitor and replete lytes, keep K>4, Mg>2.    - Other cardiovascular workup will depend on clinical course.  - All other workup per primary team.  - Will continue to follow.    Lenore Long NP  Cardiology

## 2019-01-07 ENCOUNTER — APPOINTMENT (OUTPATIENT)
Dept: GASTROENTEROLOGY | Facility: HOSPITAL | Age: 69
End: 2019-01-07

## 2019-01-07 LAB
ALBUMIN SERPL ELPH-MCNC: 1.1 G/DL — LOW (ref 3.3–5)
ALP SERPL-CCNC: 370 U/L — HIGH (ref 40–120)
ALT FLD-CCNC: 35 U/L — SIGNIFICANT CHANGE UP (ref 12–78)
ANION GAP SERPL CALC-SCNC: 7 MMOL/L — SIGNIFICANT CHANGE UP (ref 5–17)
AST SERPL-CCNC: 56 U/L — HIGH (ref 15–37)
BILIRUB SERPL-MCNC: 0.5 MG/DL — SIGNIFICANT CHANGE UP (ref 0.2–1.2)
BUN SERPL-MCNC: 20 MG/DL — SIGNIFICANT CHANGE UP (ref 7–23)
CALCIUM SERPL-MCNC: 7.4 MG/DL — LOW (ref 8.5–10.1)
CHLORIDE SERPL-SCNC: 100 MMOL/L — SIGNIFICANT CHANGE UP (ref 96–108)
CO2 SERPL-SCNC: 31 MMOL/L — SIGNIFICANT CHANGE UP (ref 22–31)
CREAT SERPL-MCNC: 4.8 MG/DL — HIGH (ref 0.5–1.3)
CULTURE RESULTS: SIGNIFICANT CHANGE UP
GLUCOSE SERPL-MCNC: 152 MG/DL — HIGH (ref 70–99)
GRAM STN FLD: SIGNIFICANT CHANGE UP
HCT VFR BLD CALC: 33.2 % — LOW (ref 34.5–45)
HGB BLD-MCNC: 10.4 G/DL — LOW (ref 11.5–15.5)
MAGNESIUM SERPL-MCNC: 2 MG/DL — SIGNIFICANT CHANGE UP (ref 1.6–2.6)
MCHC RBC-ENTMCNC: 28.9 PG — SIGNIFICANT CHANGE UP (ref 27–34)
MCHC RBC-ENTMCNC: 31.3 GM/DL — LOW (ref 32–36)
MCV RBC AUTO: 92.2 FL — SIGNIFICANT CHANGE UP (ref 80–100)
NRBC # BLD: 0 /100 WBCS — SIGNIFICANT CHANGE UP (ref 0–0)
PHOSPHATE SERPL-MCNC: 2.1 MG/DL — LOW (ref 2.5–4.5)
PLATELET # BLD AUTO: 243 K/UL — SIGNIFICANT CHANGE UP (ref 150–400)
POTASSIUM SERPL-MCNC: 3.6 MMOL/L — SIGNIFICANT CHANGE UP (ref 3.5–5.3)
POTASSIUM SERPL-SCNC: 3.6 MMOL/L — SIGNIFICANT CHANGE UP (ref 3.5–5.3)
PROT SERPL-MCNC: 6.3 G/DL — SIGNIFICANT CHANGE UP (ref 6–8.3)
RBC # BLD: 3.6 M/UL — LOW (ref 3.8–5.2)
RBC # FLD: 21.5 % — HIGH (ref 10.3–14.5)
SODIUM SERPL-SCNC: 138 MMOL/L — SIGNIFICANT CHANGE UP (ref 135–145)
WBC # BLD: 11.79 K/UL — HIGH (ref 3.8–10.5)
WBC # FLD AUTO: 11.79 K/UL — HIGH (ref 3.8–10.5)

## 2019-01-07 PROCEDURE — 99232 SBSQ HOSP IP/OBS MODERATE 35: CPT

## 2019-01-07 PROCEDURE — 99233 SBSQ HOSP IP/OBS HIGH 50: CPT | Mod: GC

## 2019-01-07 RX ORDER — LACTOBACILLUS ACIDOPHILUS 100MM CELL
1 CAPSULE ORAL
Qty: 0 | Refills: 0 | Status: DISCONTINUED | OUTPATIENT
Start: 2019-01-07 | End: 2019-01-14

## 2019-01-07 RX ORDER — SODIUM,POTASSIUM PHOSPHATES 278-250MG
1 POWDER IN PACKET (EA) ORAL EVERY 4 HOURS
Qty: 0 | Refills: 0 | Status: COMPLETED | OUTPATIENT
Start: 2019-01-07 | End: 2019-01-07

## 2019-01-07 RX ADMIN — ATORVASTATIN CALCIUM 80 MILLIGRAM(S): 80 TABLET, FILM COATED ORAL at 21:30

## 2019-01-07 RX ADMIN — Medication 50 MILLIGRAM(S): at 18:23

## 2019-01-07 RX ADMIN — Medication 1 PACKET(S): at 18:23

## 2019-01-07 RX ADMIN — Medication 100 MILLIGRAM(S): at 13:54

## 2019-01-07 RX ADMIN — ISOSORBIDE MONONITRATE 30 MILLIGRAM(S): 60 TABLET, EXTENDED RELEASE ORAL at 21:30

## 2019-01-07 RX ADMIN — Medication 1: at 13:08

## 2019-01-07 RX ADMIN — Medication 60 MILLIGRAM(S): at 05:27

## 2019-01-07 RX ADMIN — GABAPENTIN 300 MILLIGRAM(S): 400 CAPSULE ORAL at 18:23

## 2019-01-07 RX ADMIN — CEFTRIAXONE 100 GRAM(S): 500 INJECTION, POWDER, FOR SOLUTION INTRAMUSCULAR; INTRAVENOUS at 18:29

## 2019-01-07 RX ADMIN — Medication 100 MILLIGRAM(S): at 21:30

## 2019-01-07 RX ADMIN — HEPARIN SODIUM 5000 UNIT(S): 5000 INJECTION INTRAVENOUS; SUBCUTANEOUS at 13:16

## 2019-01-07 RX ADMIN — Medication 50 MILLIGRAM(S): at 05:27

## 2019-01-07 RX ADMIN — HEPARIN SODIUM 5000 UNIT(S): 5000 INJECTION INTRAVENOUS; SUBCUTANEOUS at 05:27

## 2019-01-07 RX ADMIN — Medication 100 MILLIGRAM(S): at 05:26

## 2019-01-07 RX ADMIN — Medication 1 PACKET(S): at 14:51

## 2019-01-07 RX ADMIN — HEPARIN SODIUM 5000 UNIT(S): 5000 INJECTION INTRAVENOUS; SUBCUTANEOUS at 21:30

## 2019-01-07 RX ADMIN — Medication 81 MILLIGRAM(S): at 13:13

## 2019-01-07 RX ADMIN — GABAPENTIN 300 MILLIGRAM(S): 400 CAPSULE ORAL at 05:27

## 2019-01-07 RX ADMIN — Medication 1: at 16:35

## 2019-01-07 RX ADMIN — Medication 1 TABLET(S): at 18:23

## 2019-01-07 RX ADMIN — ISOSORBIDE MONONITRATE 60 MILLIGRAM(S): 60 TABLET, EXTENDED RELEASE ORAL at 13:17

## 2019-01-07 NOTE — PHYSICAL THERAPY INITIAL EVALUATION ADULT - GAIT DEVIATIONS NOTED, PT EVAL
decreased weight-shifting ability/decreased reyna/decreased step length/decreased stride length/fwd flexed posture

## 2019-01-07 NOTE — PROGRESS NOTE ADULT - PROBLEM SELECTOR PLAN 1
continue ceftriaxone  surveillance cx neg 1/3 so anticipate IV abx until 1/10 and then can look at converting to PO cipro/flagyl for additional week or until 1/17  -discussed with surgery possible cholecystectomy when patient is improved in the nonacute setting

## 2019-01-07 NOTE — PROGRESS NOTE ADULT - SUBJECTIVE AND OBJECTIVE BOX
infectious diseases progress note:    IRENE TAYLOR is a 68y y. o. Female patient    Patient reports: no new concerns    ROS:    EYES:  Negative  blurry vision or double vision  GASTROINTESTINAL:  Negative for nausea, vomiting, diarrhea  -otherwise negative except for subjective    Allergies    ertapenem (Urticaria)  Purell (Rash)    Intolerances        ANTIBIOTICS/RELEVANT:  antimicrobials  cefTRIAXone   IVPB 1 Gram(s) IV Intermittent every 24 hours  metroNIDAZOLE  IVPB 500 milliGRAM(s) IV Intermittent every 8 hours    immunologic:    OTHER:  aspirin enteric coated 81 milliGRAM(s) Oral daily  atorvastatin 80 milliGRAM(s) Oral at bedtime  dextrose 40% Gel 15 Gram(s) Oral once PRN  dextrose 5%. 1000 milliLiter(s) IV Continuous <Continuous>  dextrose 50% Injectable 12.5 Gram(s) IV Push once  dextrose 50% Injectable 25 Gram(s) IV Push once  dextrose 50% Injectable 25 Gram(s) IV Push once  docusate sodium 100 milliGRAM(s) Oral daily  gabapentin 300 milliGRAM(s) Oral two times a day  glucagon  Injectable 1 milliGRAM(s) IntraMuscular once PRN  heparin  Injectable 5000 Unit(s) SubCutaneous every 8 hours  HYDROmorphone  Injectable 0.5 milliGRAM(s) IV Push every 6 hours PRN  HYDROmorphone  Injectable 1 milliGRAM(s) IV Push every 8 hours PRN  insulin lispro (HumaLOG) corrective regimen sliding scale   SubCutaneous three times a day before meals  insulin lispro (HumaLOG) corrective regimen sliding scale   SubCutaneous at bedtime  isosorbide   mononitrate ER Tablet (IMDUR) 30 milliGRAM(s) Oral at bedtime  isosorbide   mononitrate ER Tablet (IMDUR) 60 milliGRAM(s) Oral daily  metoprolol tartrate 50 milliGRAM(s) Oral two times a day  NIFEdipine XL 60 milliGRAM(s) Oral daily  senna 2 Tablet(s) Oral at bedtime      Objective:  Vital Signs Last 24 Hrs  T(C): 36.3 (07 Jan 2019 08:04), Max: 37 (06 Jan 2019 20:21)  T(F): 97.4 (07 Jan 2019 08:04), Max: 98.6 (06 Jan 2019 20:21)  HR: 70 (07 Jan 2019 08:04) (70 - 97)  BP: 128/74 (07 Jan 2019 08:04) (108/63 - 174/88)  BP(mean): --  RR: 18 (07 Jan 2019 08:04) (17 - 18)  SpO2: 100% (07 Jan 2019 08:04) (94% - 100%)    T(C): 36.3 (01-07-19 @ 08:04), Max: 37.6 (01-05-19 @ 19:58)  T(C): 36.3 (01-07-19 @ 08:04), Max: 37.6 (01-05-19 @ 19:58)  T(C): 36.3 (01-07-19 @ 08:04), Max: 37.6 (01-05-19 @ 19:58)    PHYSICAL EXAM:  Constitutional: Well-developed, well nourished  Eyes: PERRLA, EOMI  Ear/Nose/Throat: oropharynx normal	  Neck: no JVD, no lymphadenopathy, supple  Respiratory: no accessory muscle use  Cardiovascular: RRR,   Gastrointestinal: soft, NT  Extremities: no clubbing, no cyanosis, edema absent      LABS:                        10.4   11.79 )-----------( 243      ( 07 Jan 2019 06:51 )             33.2       11.79 01-07 @ 06:51  11.69 01-06 @ 08:10  14.66 01-05 @ 06:17  12.71 01-04 @ 06:43  14.34 01-03 @ 05:43  17.23 01-02 @ 13:33      01-07    138  |  100  |  20  ----------------------------<  152<H>  3.6   |  31  |  4.80<H>    Ca    7.4<L>      07 Jan 2019 06:51  Phos  2.1     01-07  Mg     2.0     01-07    TPro  6.3  /  Alb  1.1<L>  /  TBili  0.5  /  DBili  x   /  AST  56<H>  /  ALT  35  /  AlkPhos  370<H>  01-07      Creatinine, Serum: 4.80 mg/dL (01-07-19 @ 06:51)  Creatinine, Serum: 3.80 mg/dL (01-06-19 @ 08:10)  Creatinine, Serum: 5.40 mg/dL (01-05-19 @ 06:17)  Creatinine, Serum: 3.90 mg/dL (01-04-19 @ 06:43)  Creatinine, Serum: 6.20 mg/dL (01-03-19 @ 05:43)  Creatinine, Serum: 6.00 mg/dL (01-02-19 @ 13:33)      MICROBIOLOGY:    Culture - Blood (01.03.19 @ 18:25)    Specimen Source: .Blood Blood-Peripheral    Culture Results:   No growth to date.    Culture - Blood (01.02.19 @ 18:32)    -  Trimethoprim/Sulfamethoxazole: S <=0.5/9.5    -  Tobramycin: S <=2    -  Klebsiella pneumoniae: Detec    Gram Stain:   Growth in aerobic and anaerobic bottles: Gram Negative Rods    -  Piperacillin/Tazobactam: I 32    -  Meropenem: S <=1    -  Levofloxacin: S <=1    -  Imipenem: S <=1    -  Ertapenem: S <=0.5    -  Gentamicin: S <=1    -  Ciprofloxacin: S <=0.5    -  Ceftriaxone: S <=1 Enterobacter, Citrobacter, and Serratia may develop resistance during prolonged therapy    -  Cefoxitin: S <=4    -  Cefepime: S <=2    -  Cefazolin: R 16    -  Aztreonam: S <=4    -  Ampicillin: R >16 These ampicillin results predict results for amoxicillin    -  Ampicillin/Sulbactam: R >16/8    -  Amikacin: S <=8    Specimen Source: .Blood Blood-Peripheral    Organism: Klebsiella pneumoniae    Organism: Blood Culture PCR    Culture Results:   Growth in aerobic and anaerobic bottles: Klebsiella pneumoniae      RADIOLOGY & ADDITIONAL STUDIES:    < from: [facility] Hepatobiliary Imaging (01.03.19 @ 18:18) >  EXAM:  [facility] HEPATOBILIARY IMG                            PROCEDURE DATE:  01/03/2019          INTERPRETATION:    RADIOPHARMACEUTICAL:  99mTc-Mebrofenin  DOSE: 3.0 mCi IV    CLINICAL INFORMATION: 68 year old year old female with epigastric pain   and fever, referred to evaluate for acute cholecystitis    TECHNIQUE: Dynamic images of the anterior abdomen were obtained for 90   minutes immediately following radiotracer injection. Static images of the   abdomen in the anterior, right anterior obliqueand right lateral   projections were also obtained.    COMPARISON: No previous hepatobiliary scan for comparison.    FINDINGS: There is prompt uptake of the injected radiotracer by the   hepatocytes. The gallbladder is first visualized at 55 minutes post   tracer injection and bowel activity by 35 minutes. There is normal tracer   clearance from the liver by the end of the study.     IMPRESSION: Normal hepatobiliary scan.     No scan evidence of acute cholecystitis.

## 2019-01-07 NOTE — PROGRESS NOTE ADULT - SUBJECTIVE AND OBJECTIVE BOX
Subjective: Pt denies abdominal pain, tolerating po.    Objective:  Vital Signs Last 24 Hrs  T(C): 36.3 (07 Jan 2019 08:04), Max: 37 (06 Jan 2019 20:21)  T(F): 97.4 (07 Jan 2019 08:04), Max: 98.6 (06 Jan 2019 20:21)  HR: 70 (07 Jan 2019 08:04) (70 - 97)  BP: 128/74 (07 Jan 2019 08:04) (108/63 - 174/88)  BP(mean): --  RR: 18 (07 Jan 2019 08:04) (17 - 18)  SpO2: 100% (07 Jan 2019 08:04) (94% - 100%)    Heent: QUENTIN, FREOM  Pul: decreased at bases  Cor: RSR, without murmurs  Abdomen: normal bowel sounds, without distension or tenderness  Extremities: without edema, motor/sensory intact, without calf pain, Homans sign negative.                        10.4   11.79 )-----------( 243      ( 07 Jan 2019 06:51 )             33.2       01-07    138  |  100  |  20  ----------------------------<  152<H>  3.6   |  31  |  4.80<H>    Ca    7.4<L>      07 Jan 2019 06:51  Phos  2.1     01-07  Mg     2.0     01-07    TPro  6.3  /  Alb  1.1<L>  /  TBili  0.5  /  DBili  x   /  AST  56<H>  /  ALT  35  /  AlkPhos  370<H>  01-07      < from: [facility] Hepatobiliary Imaging (01.03.19 @ 18:18) >  EXAM:  [facility] HEPATOBILIARY IMG                            PROCEDURE DATE:  01/03/2019          INTERPRETATION:    RADIOPHARMACEUTICAL:  99mTc-Mebrofenin  DOSE: 3.0 mCi IV    CLINICAL INFORMATION: 68 year old year old female with epigastric pain   and fever, referred to evaluate for acute cholecystitis    TECHNIQUE: Dynamic images of the anterior abdomen were obtained for 90   minutes immediately following radiotracer injection. Static images of the   abdomen in the anterior, right anterior obliqueand right lateral   projections were also obtained.    COMPARISON: No previous hepatobiliary scan for comparison.    FINDINGS: There is prompt uptake of the injected radiotracer by the   hepatocytes. The gallbladder is first visualized at 55 minutes post   tracer injection and bowel activity by 35 minutes. There is normal tracer   clearance from the liver by the end of the study.     IMPRESSION: Normal hepatobiliary scan.     No scan evidence of acute cholecystitis.       < end of copied text >

## 2019-01-07 NOTE — PROGRESS NOTE ADULT - ASSESSMENT
69yo F with PMH of ESRD on HD (M, W, F), CAD s/p stents 2007, Type 2 DM, HTN, MI, recent admission with ascending cholangitis with ESBL E coli bacteremia s/p biliary stent, recent admission with liver abscesses (on Tigecycline), who presents to the ED with abdominal pain x2 days, 4 episodes of vomiting, admitted for sepsis due to suspected acute on chronic cholecystitis found to have Klebsiella bacteremia.

## 2019-01-07 NOTE — PROGRESS NOTE ADULT - SUBJECTIVE AND OBJECTIVE BOX
Patient is a 68y old  Female who presents with a chief complaint of abd pain, vomiting (03 Jan 2019 12:08)      Patient seen in follow up for ESRD on HD. + Klebsiella bacteremia    PAST MEDICAL HISTORY:  Cholecystitis with cholangitis  Acute urinary retention  Liver abscess  ESRD (end stage renal disease) on dialysis  CAD (coronary artery disease)  Diabetes  CRF (chronic renal failure)  Hypertension  Pneumonia  Myocardial infarction  Hypertension  Diabetes    MEDICATIONS  (STANDING):  aspirin enteric coated 81 milliGRAM(s) Oral daily  atorvastatin 80 milliGRAM(s) Oral at bedtime  cefTRIAXone   IVPB 1 Gram(s) IV Intermittent every 24 hours  dextrose 5%. 1000 milliLiter(s) (50 mL/Hr) IV Continuous <Continuous>  dextrose 50% Injectable 12.5 Gram(s) IV Push once  dextrose 50% Injectable 25 Gram(s) IV Push once  dextrose 50% Injectable 25 Gram(s) IV Push once  docusate sodium 100 milliGRAM(s) Oral daily  gabapentin 300 milliGRAM(s) Oral two times a day  heparin  Injectable 5000 Unit(s) SubCutaneous every 8 hours  insulin lispro (HumaLOG) corrective regimen sliding scale   SubCutaneous three times a day before meals  insulin lispro (HumaLOG) corrective regimen sliding scale   SubCutaneous at bedtime  isosorbide   mononitrate ER Tablet (IMDUR) 30 milliGRAM(s) Oral at bedtime  isosorbide   mononitrate ER Tablet (IMDUR) 60 milliGRAM(s) Oral daily  metoprolol tartrate 50 milliGRAM(s) Oral two times a day  metroNIDAZOLE  IVPB 500 milliGRAM(s) IV Intermittent every 8 hours  NIFEdipine XL 60 milliGRAM(s) Oral daily  senna 2 Tablet(s) Oral at bedtime    MEDICATIONS  (PRN):  dextrose 40% Gel 15 Gram(s) Oral once PRN Blood Glucose LESS THAN 70 milliGRAM(s)/deciliter  glucagon  Injectable 1 milliGRAM(s) IntraMuscular once PRN Glucose LESS THAN 70 milligrams/deciliter  HYDROmorphone  Injectable 0.5 milliGRAM(s) IV Push every 6 hours PRN Moderate Pain (4 - 6)  HYDROmorphone  Injectable 1 milliGRAM(s) IV Push every 8 hours PRN Severe Pain (7 - 10)    T(C): 36.3 (01-07-19 @ 08:04), Max: 37.6 (01-05-19 @ 19:58)  HR: 70 (01-07-19 @ 08:04) (70 - 97)  BP: 128/74 (01-07-19 @ 08:04) (108/63 - 174/88)  RR: 18 (01-07-19 @ 08:04)  SpO2: 100% (01-07-19 @ 08:04)  Wt(kg): --  I&O's Detail        PHYSICAL EXAM:  General: NAD  Respiratory: b/l air entry  Cardiovascular: S1 S2  Gastrointestinal: soft  Extremities:  edema                 LABORATORY:                        10.4   11.79 )-----------( 243      ( 07 Jan 2019 06:51 )             33.2     01-07    138  |  100  |  20  ----------------------------<  152<H>  3.6   |  31  |  4.80<H>    Ca    7.4<L>      07 Jan 2019 06:51  Phos  2.1     01-07  Mg     2.0     01-07    TPro  6.3  /  Alb  1.1<L>  /  TBili  0.5  /  DBili  x   /  AST  56<H>  /  ALT  35  /  AlkPhos  370<H>  01-07    Sodium, Serum: 138 mmol/L (01-07 @ 06:51)  Sodium, Serum: 137 mmol/L (01-06 @ 08:10)    Potassium, Serum: 3.6 mmol/L (01-07 @ 06:51)  Potassium, Serum: 3.8 mmol/L (01-06 @ 08:10)    Hemoglobin: 10.4 g/dL (01-07 @ 06:51)  Hemoglobin: 10.0 g/dL (01-06 @ 08:10)  Hemoglobin: 10.9 g/dL (01-05 @ 06:17)    Creatinine, Serum 4.80 (01-07 @ 06:51)  Creatinine, Serum 3.80 (01-06 @ 08:10)  Creatinine, Serum 5.40 (01-05 @ 06:17)        LIVER FUNCTIONS - ( 07 Jan 2019 06:51 )  Alb: 1.1 g/dL / Pro: 6.3 g/dL / ALK PHOS: 370 U/L / ALT: 35 U/L / AST: 56 U/L / GGT: x

## 2019-01-07 NOTE — PROGRESS NOTE ADULT - SUBJECTIVE AND OBJECTIVE BOX
[facility] -- Glynn Bullock, Claudia Davis Pannella, Patel, Savella  Office # [phone]      Follow Up:    Elevated troponin  Subjective/Observations:   No events overnight resting comfortably in bed.  No complaints of chest pain, dyspnea, or palpitations reported. No signs of orthopnea or PND.     REVIEW OF SYSTEMS: All other review of systems is negative unless indicated above    PAST MEDICAL & SURGICAL HISTORY:  Cholecystitis with cholangitis  Acute urinary retention  Liver abscess  ESRD (end stage renal disease) on dialysis: F  CAD (coronary artery disease): s/p stent in 2007  Diabetes  Myocardial infarction  Hypertension  H/O: hysterectomy      MEDICATIONS  (STANDING):  aspirin enteric coated 81 milliGRAM(s) Oral daily  atorvastatin 80 milliGRAM(s) Oral at bedtime  cefTRIAXone   IVPB 1 Gram(s) IV Intermittent every 24 hours  dextrose 5%. 1000 milliLiter(s) (50 mL/Hr) IV Continuous <Continuous>  dextrose 50% Injectable 12.5 Gram(s) IV Push once  dextrose 50% Injectable 25 Gram(s) IV Push once  dextrose 50% Injectable 25 Gram(s) IV Push once  docusate sodium 100 milliGRAM(s) Oral daily  gabapentin 300 milliGRAM(s) Oral two times a day  heparin  Injectable 5000 Unit(s) SubCutaneous every 8 hours  insulin lispro (HumaLOG) corrective regimen sliding scale   SubCutaneous three times a day before meals  insulin lispro (HumaLOG) corrective regimen sliding scale   SubCutaneous at bedtime  isosorbide   mononitrate ER Tablet (IMDUR) 30 milliGRAM(s) Oral at bedtime  isosorbide   mononitrate ER Tablet (IMDUR) 60 milliGRAM(s) Oral daily  metoprolol tartrate 50 milliGRAM(s) Oral two times a day  metroNIDAZOLE  IVPB 500 milliGRAM(s) IV Intermittent every 8 hours  NIFEdipine XL 60 milliGRAM(s) Oral daily  senna 2 Tablet(s) Oral at bedtime    MEDICATIONS  (PRN):  dextrose 40% Gel 15 Gram(s) Oral once PRN Blood Glucose LESS THAN 70 milliGRAM(s)/deciliter  glucagon  Injectable 1 milliGRAM(s) IntraMuscular once PRN Glucose LESS THAN 70 milligrams/deciliter  HYDROmorphone  Injectable 0.5 milliGRAM(s) IV Push every 6 hours PRN Moderate Pain (4 - 6)  HYDROmorphone  Injectable 1 milliGRAM(s) IV Push every 8 hours PRN Severe Pain (7 - 10)      Allergies    ertapenem (Urticaria)  Purell (Rash)    Intolerances        Vital Signs Last 24 Hrs  T(C): 36.3 (07 Jan 2019 08:04), Max: 37 (06 Jan 2019 20:21)  T(F): 97.4 (07 Jan 2019 08:04), Max: 98.6 (06 Jan 2019 20:21)  HR: 70 (07 Jan 2019 08:04) (70 - 97)  BP: 128/74 (07 Jan 2019 08:04) (108/63 - 174/88)  BP(mean): --  RR: 18 (07 Jan 2019 08:04) (17 - 18)  SpO2: 100% (07 Jan 2019 08:04) (94% - 100%)    I&O's Summary        PHYSICAL EXAM:  TELE: NSR w/ 4 beat VT  Constitutional: NAD, awake and alert, well-developed  HEENT: Moist Mucous Membranes, Anicteric  Pulmonary: Non-labored, breath sounds with Bilaterally diminished at bases  No  wheezes, crackles or rhonchi   Cardiovascular: Regular, S1 and S2 nl, No murmurs, rubs, gallops or clicks  Gastrointestinal: Bowel Sounds present, soft, nontender.   Lymph: No lymphadenopathy. No peripheral edema.  Skin: No visible rashes or ulcers.  Psych:  Mood & affect appropriate    LABS: All Labs Reviewed:                        10.4   11.79 )-----------( 243      ( 07 Jan 2019 06:51 )             33.2                         10.0   11.69 )-----------( 218      ( 06 Jan 2019 08:10 )             32.6                         10.9   14.66 )-----------( 224      ( 05 Jan 2019 06:17 )             35.2     07 Jan 2019 06:51    138    |  100    |  20     ----------------------------<  152    3.6     |  31     |  4.80   06 Jan 2019 08:10    137    |  100    |  15     ----------------------------<  147    3.8     |  29     |  3.80   05 Jan 2019 06:17    135    |  97     |  31     ----------------------------<  151    4.2     |  28     |  5.40     Ca    7.4        07 Jan 2019 06:51  Ca    7.5        06 Jan 2019 08:10  Ca    7.8        05 Jan 2019 06:17  Phos  2.1       07 Jan 2019 06:51  Phos  3.1       06 Jan 2019 08:10  Mg     2.0       07 Jan 2019 06:51  Mg     1.9       06 Jan 2019 08:10    TPro  6.3    /  Alb  1.1    /  TBili  0.5    /  DBili  x      /  AST  56     /  ALT  35     /  AlkPhos  370    07 Jan 2019 06:51  TPro  6.0    /  Alb  1.1    /  TBili  0.7    /  DBili  x      /  AST  54     /  ALT  35     /  AlkPhos  409    06 Jan 2019 08:10  TPro  6.4    /  Alb  1.2    /  TBili  0.8    /  DBili  .60    /  AST  60     /  ALT  39     /  AlkPhos  380    05 Jan 2019 06:17             ECG:  < from: 12 Lead ECG (01.02.19 @ 16:10) >  Ventricular Rate 69 BPM    Atrial Rate 69 BPM    P-R Interval 176 ms    QRS Duration 154 ms    Q-T Interval 468 ms    QTC Calculation(Bezet) 501 ms    P Axis 41 degrees    R Axis 101 degrees    T Axis -10 degrees    Diagnosis Line Normal sinus rhythm  Right bundle branch block  Inferior infarct (cited on or before 31-OCT-2018)  Abnormal ECG    Confirmed by Jovanna Hunter ([id number]) on 1/3/2019 5:19:10 PM    < end of copied text >    Echo:  < from: TTE Echo Doppler w/o Cont (05.04.18 @ 20:56) >  XAM:  ECHO TTE W/O CON COMP W/DOPPLR         PROCEDURE DATE:  05/04/2018        INTERPRETATION:  Height 165cm. weight 95kg. blood pressure 129/81.   Technician TE. Indication for study dyspnea.    Aortic root 2.3 cm left atrium 4.4 cm left ventricular end-diastolic   diameter 5.7 cm left ventricular end-systolic diameter 4.3 cm septal wall   thickness 1.2 cm posterior wall thickness 1.2 cm visually equivocally   estimated ejection fraction 50-55%.    The aortic root is calcified and of normaldiameter. 3 distinct leaflets   of the aortic valve are not well demonstrated by this study. The   technician was unable to identify any significant gradient across this   valve to suggest hemodynamically significant aortic stenosis. Left atrium   isenlarged. The left ventricle was difficult to visualize by all   standard echocardiographic windows. Possibly the end-diastolic diameter   is mildly increased. The wall thickness is borderline increased. Any   significant focal wall motion abnormality cannot be ascertained by this   study. Visually estimated ejection fraction 50-55%. The mitral annulus is   calcified. The mitral valve leaflets are mildly thickened with a normal   EF slope and excursion. There is no evidence for hemodynamically   significant mitral stenosis. On the very limited color flow Doppler   portion examination mild mitral regurgitation suggested.    Conclusion:  1. Technically difficult limited supine study. Please note that this is a   portable study and the study is also limited due to patient's body   habitus.  2. 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  3. Enlarged left atrium with associated mild mitral regurgitation.  4. Very limited visualization left ventricle which may represent mild   left ventricular concentric hypertrophy with a well-preserved ejection   fraction as described above.  5. A very small posterior pericardial effusion is suggested but not   diagnostic.  6. Correlate these limited findings clinically.                    SALVADOR PHILLIPS M.D., ATTENDING CARDIOLOGIST  This document has been electronically signed. May  5 2018  9:47AM        < end of copied text >    Radiology:  < from: CT Chest w/ IV Cont (01.02.19 @ 15:04) >  EXAM:  CT ABDOMEN AND PELVIS IC                          EXAM:  CT CHEST IC                            PROCEDURE DATE:  01/02/2019          INTERPRETATION:  .    CLINICAL INFORMATION: Lower chest pain.    TECHNIQUE: Helical axial images were obtained from the thoracic inlet to   the pubic symphysis. 95 mls of Omnipaque-350 was administered   intravenously without complication and 5 mls were discarded. Oral   contrast was not administered. Sagittal and coronal reconstructed images   were obtained from the source data.    COMPARISON: Most recent prior CT examination of the abdomen and pelvis   from 11/26/2018. Prior MRI examination of the abdomen from 11/27/2018.   Prior chest, abdomen, and pelvis CT examination from 10/29/2018.    FINDINGS: Scattered subcentimeter sized lymph nodes are noted within the   axillary regions, mediastinum, and hilar areas.    The heart size is enlarged. The pericardium appears unremarkable. There   is a right sided dialysis catheter with its tip at the right atrium. A   left-sided IJ central line is noted with the tip at the SVC. No filling   defects are notable within the pulmonary arterial vessels. There are   atherosclerotic calcifications of the imaged coronary arteries, aorta,   and branch vessels. The imaged portions of the aorta are normal in   caliber.    The central airways are patent. Patchy groundglass opacities are notable   throughout both lungs with resultant mosaic attenuation. Scattered areas   of linear atelectasis are notable within the bilateral lung bases, left   upper lobe, and lingula.    There is redemonstration of nonspecific gallbladder wall thickening   and/or edema and/or fat deposition. Sludge and/or stones are noted within   the lumen of the gallbladder. A cholecystostomy tube tract is notable   along the right upper anterior abdominal wall. A small amount of   pneumobilia is noted, compatible with stent patency. The common bile duct   remains dilated and measures up to 1.2 cm. A common bile duct stent is   again noted. No radiopaque stones are notable adjacent to the stent.   Previously noted abscesses within the liver appear less conspicuous   compared to the prior MRI and CT examinations. A small low-attenuation   focus is noted within the central hepatic area measuring up to 8 mm   (series 2, image 114).     The pancreas, spleen, and adrenal glands appear unremarkable.    There is atrophy of the bilateral kidneys. A left-sided renal cyst   appears unchanged. There is minimal nonspecific bilateral perinephric   thickening. There is no hydronephrosis bilaterally. Arterial vascular   calcifications are noted in the vicinity of the bilateral renal   collecting systems. The urinary bladder appears unremarkable.    There are multiple scattered nonspecificsubcentimeter retroperitoneal   and mesenteric lymph nodes.    There is no bowel obstruction or abdominal ascites. A duodenal   diverticulum appears unchanged. Gas and stool are notable throughout the   large bowel loops. The appendix is normal.    The patient is status post hysterectomy. No adnexal masses are noted.    Multilevel degenerative changes notable within the imaged spine. There is   a moderate compression deformity of the L2 vertebral body which appears   unchanged. There is also a mild superior endplate deformity of the T12   vertebral body which also appears unchanged.    Areas of mixed lucency and sclerosis adjacent to the bilateral SI joints   appear unchanged. Mild diffuse osteosclerosis may be compatible with   early renal osteodystrophy.    IMPRESSION:    CT chest:  1. Groundglass mosaic attenuation to the lungs which may reflect small   airways or small vessel disease.    CT abdomen and pelvis:  1. Chronic cholecystitis, as seen on prior exams. A superimposed acute   component cannot be completely excluded.  2. Redemonstration of a common bile duct stent with pneumobilia,   compatible with stent patency.  3. Interval decreased conspicuity of previous noted abscesses throughout   the liver with a small abscess remaining, as discussed.  4. Other chronic nonemergent findings, as discussed.                CARLITO BEE M.D., ATTENDING RADIOLOGIST  This document has been electronically signed. Jan 2 2019  3:30PM        < end of copied text >           Ab Linda ANP   Cardiology

## 2019-01-07 NOTE — PROGRESS NOTE ADULT - ASSESSMENT
67 yo F with PMH of ESRD on HD (M, W, F) via permacath started in June 2018, CAD s/p 1 stent 2007, DM type 2, HTN, MI, biliary stent, Liver abscess, obesity who presents to the ED with abdominal pain x2 days, 4 episodes of vomiting. Currently admitted for sepsis 2/2 acute on chronic cholecystitis vs failed treatment of liver abscess. Patient had recent admission at [facility] November 2018 for acute cholecystitis causing sepsis, S/P Perc Noa 10/30 and ESBL E. coli bacteremia. She was treated with IV ertapenem and developed A fIb with RVR. ERCP was attempted at [facility] but was unsuccessful and was transferred to [facility] where ERCP was performed with stone extraction and stent placement. She also had another admission in November 2018 at [place] shortly after for acute pancreatitis. Patient has been in rehab s/p last discharge from [facility]. Overnight with several episodes of vomiting. For choly today    - No clear evidence of acute ischemia.  Monitor closely for the development of anginal symptoms or clinical signs of ischemia as patient has history of CAD s/p stent  - Continue asa, Statin, imdur with hx of CAD s/p 1 stent in 2007. Has been on dual antiplatelet therapy since. Outpatient cardiologist is Dr Rachel Black in [place].   - Continue to hold Plavix for possible surgical intervention; please resume when cleared by surgery  - For Choly today    - No acute changes on EKG compared to previous.  - No meaningful evidence of volume overload.  No significant cardiac murmurs and no cardiac arrhythmias per tele.  Patient is a moderate risk and optimized for a non-cardiac procedure.  Please continue routine hemodynamics  - BP labile, likely due to abdominal pain.  If persists, may need to increase Procardia XL.  Continue metoprolol and procardia XL at same dose for now  - Monitor and replete lytes, keep K>4, Mg>2.    - Other cardiovascular workup will depend on clinical course.  - All other workup per primary team.  - Will continue to follow.    Ab Linda [facility]  Cardiology 67 yo F with PMH of ESRD on HD (M, W, F) via permacath started in June 2018, CAD s/p 1 stent 2007, DM type 2, HTN, MI, biliary stent, Liver abscess, obesity who presents to the ED with abdominal pain x2 days, 4 episodes of vomiting. Currently admitted for sepsis 2/2 acute on chronic cholecystitis vs failed treatment of liver abscess. Patient had recent admission at [facility] November 2018 for acute cholecystitis causing sepsis, S/P Perc Noa 10/30 and ESBL E. coli bacteremia. She was treated with IV ertapenem and developed A fIb with RVR. ERCP was attempted at [facility] but was unsuccessful and was transferred to [facility] where ERCP was performed with stone extraction and stent placement. She also had another admission in November 2018 at [place] shortly after for acute pancreatitis. Patient has been in rehab s/p last discharge from [facility]. Overnight with several episodes of vomiting. Possibly for choly today    - No clear evidence of acute ischemia.  Monitor closely for the development of anginal symptoms or clinical signs of ischemia as patient has history of CAD s/p stent  - Continue asa, Statin, imdur with hx of CAD s/p 1 stent in 2007. Has been on dual antiplatelet therapy since. Outpatient cardiologist is Dr Rachel Black in [place].   - Continue to hold Plavix for possible surgical intervention; can resume when cleared by surgery, but this is certainly not obligatory if bleeding is felt to be a concern  - Possibly for Choly today    - No acute changes on EKG compared to previous.  - No meaningful evidence of volume overload.  No significant cardiac murmurs and no cardiac arrhythmias per tele.  Patient is a moderate risk and optimized for a non-cardiac procedure.  Please continue routine hemodynamics  - BP labile, likely due to abdominal pain.  If persists, may need to increase Procardia XL.  Continue metoprolol and procardia XL at same dose for now  - Monitor and replete lytes, keep K>4, Mg>2.    - Other cardiovascular workup will depend on clinical course.  - All other workup per primary team.  - Will continue to follow.    Ab Linda [facility]  Cardiology

## 2019-01-07 NOTE — PHYSICAL THERAPY INITIAL EVALUATION ADULT - ADDITIONAL COMMENTS
Pt was at rehab prior to this admission and was ready for d/c home. Pt lives with her family in a house, uses chair lift for the steps. Pt ambulates with RW and assist and family able to assist with ADLs as per dtr. Information obtained from dtr at bedside- pt speaks April

## 2019-01-07 NOTE — PROGRESS NOTE ADULT - ASSESSMENT
·	ESRD on HD  ·	Klebsiella bacteremia  ·	s/p recent ERCP and CBD stone extraction @ [facility], h/o Liver abscess  ·	Diabetes  ·	Hypertension    HD tomorrow. To continue HD TIW as scheduled. Fluid removal as tolerated by BP. Dietary and PO fluid restriction.   Monitor BP trend. Titrate BP meds as needed. Salt restriction. ID follow up. IV abx.    Monitor blood sugar levels. Insulin coverage as needed. Surgery follow up.

## 2019-01-07 NOTE — PROGRESS NOTE ADULT - PROBLEM SELECTOR PLAN 3
Pt has PICC in place and course of Invanz/Tigecycline as outpt  - Seems to be getting smaller on CT  - Continue to treat Klebsiella bacteremia with Rocephin/Flagyl

## 2019-01-07 NOTE — PROGRESS NOTE ADULT - SUBJECTIVE AND OBJECTIVE BOX
Patient is a 68y old  Female who presents with a chief complaint of abd pain, vomiting (07 Jan 2019 13:44)      INTERVAL HPI/OVERNIGHT EVENTS: Patient seen and examined at bedside. Patient denied any CP, SOB, abdominal pain, N/V. Admitted to loose BM, however unable to describe her BM as she had not seen it. Per RN, BM this morning was softer but not watery and without foul odor. Per tele monitor, patient had 3 beats of V tach overnight.    MEDICATIONS  (STANDING):  aspirin enteric coated 81 milliGRAM(s) Oral daily  atorvastatin 80 milliGRAM(s) Oral at bedtime  cefTRIAXone   IVPB 1 Gram(s) IV Intermittent every 24 hours  dextrose 5%. 1000 milliLiter(s) (50 mL/Hr) IV Continuous <Continuous>  dextrose 50% Injectable 12.5 Gram(s) IV Push once  dextrose 50% Injectable 25 Gram(s) IV Push once  dextrose 50% Injectable 25 Gram(s) IV Push once  docusate sodium 100 milliGRAM(s) Oral daily  gabapentin 300 milliGRAM(s) Oral two times a day  heparin  Injectable 5000 Unit(s) SubCutaneous every 8 hours  insulin lispro (HumaLOG) corrective regimen sliding scale   SubCutaneous three times a day before meals  insulin lispro (HumaLOG) corrective regimen sliding scale   SubCutaneous at bedtime  isosorbide   mononitrate ER Tablet (IMDUR) 30 milliGRAM(s) Oral at bedtime  isosorbide   mononitrate ER Tablet (IMDUR) 60 milliGRAM(s) Oral daily  lactobacillus acidophilus 1 Tablet(s) Oral three times a day with meals  metoprolol tartrate 50 milliGRAM(s) Oral two times a day  metroNIDAZOLE  IVPB 500 milliGRAM(s) IV Intermittent every 8 hours  NIFEdipine XL 60 milliGRAM(s) Oral daily  potassium phosphate / sodium phosphate powder 1 Packet(s) Oral every 4 hours  senna 2 Tablet(s) Oral at bedtime    MEDICATIONS  (PRN):  dextrose 40% Gel 15 Gram(s) Oral once PRN Blood Glucose LESS THAN 70 milliGRAM(s)/deciliter  glucagon  Injectable 1 milliGRAM(s) IntraMuscular once PRN Glucose LESS THAN 70 milligrams/deciliter  HYDROmorphone  Injectable 0.5 milliGRAM(s) IV Push every 6 hours PRN Moderate Pain (4 - 6)  HYDROmorphone  Injectable 1 milliGRAM(s) IV Push every 8 hours PRN Severe Pain (7 - 10)      Allergies    ertapenem (Urticaria)  Purell (Rash)    Intolerances        REVIEW OF SYSTEMS:  CONSTITUTIONAL: No fever or chills  HEENT: No headache, no sore throat  RESPIRATORY: No cough, wheezing, or shortness of breath  CARDIOVASCULAR: No chest pain, palpitations, or leg swelling  GASTROINTESTINAL: No abd pain, nausea, vomiting, +diarrhea  GENITOURINARY: No dysuria, frequency, or hematuria  NEUROLOGICAL: No focal weakness or dizziness  MUSCULOSKELETAL: No myalgias     Vital Signs Last 24 Hrs  T(C): 36.3 (07 Jan 2019 08:04), Max: 37 (06 Jan 2019 20:21)  T(F): 97.4 (07 Jan 2019 08:04), Max: 98.6 (06 Jan 2019 20:21)  HR: 70 (07 Jan 2019 08:04) (70 - 97)  BP: 128/74 (07 Jan 2019 08:04) (108/63 - 166/85)  BP(mean): --  RR: 18 (07 Jan 2019 08:04) (17 - 18)  SpO2: 100% (07 Jan 2019 08:04) (94% - 100%)    PHYSICAL EXAM:  GENERAL: NAD  HEENT: EOMI, moist mucous membranes  CHEST/LUNG: CTA b/l, no rales, wheezes, or rhonchi  HEART: RRR, S1, S2  ABDOMEN: BS+, soft, nontender, nondistended  EXTREMITIES: No edema, cyanosis, or calf tenderness  NERVOUS SYSTEM: AA&Ox3    LABS:                        10.4   11.79 )-----------( 243      ( 07 Jan 2019 06:51 )             33.2     CBC Full  -  ( 07 Jan 2019 06:51 )  WBC Count : 11.79 K/uL  Hemoglobin : 10.4 g/dL  Hematocrit : 33.2 %  Platelet Count - Automated : 243 K/uL  Mean Cell Volume : 92.2 fl  Mean Cell Hemoglobin : 28.9 pg  Mean Cell Hemoglobin Concentration : 31.3 gm/dL  Auto Neutrophil # : x  Auto Lymphocyte # : x  Auto Monocyte # : x  Auto Eosinophil # : x  Auto Basophil # : x  Auto Neutrophil % : x  Auto Lymphocyte % : x  Auto Monocyte % : x  Auto Eosinophil % : x  Auto Basophil % : x    07 Jan 2019 06:51    138    |  100    |  20     ----------------------------<  152    3.6     |  31     |  4.80     Ca    7.4        07 Jan 2019 06:51  Phos  2.1       07 Jan 2019 06:51  Mg     2.0       07 Jan 2019 06:51    TPro  6.3    /  Alb  1.1    /  TBili  0.5    /  DBili  x      /  AST  56     /  ALT  35     /  AlkPhos  370    07 Jan 2019 06:51      CAPILLARY BLOOD GLUCOSE  POCT Blood Glucose.: 170 mg/dL (07 Jan 2019 12:09)  POCT Blood Glucose.: 150 mg/dL (07 Jan 2019 07:52)  POCT Blood Glucose.: 184 mg/dL (06 Jan 2019 22:25)  POCT Blood Glucose.: 132 mg/dL (06 Jan 2019 16:58)        Culture - Blood (collected 01-04-19 @ 07:48)  Source: .Blood Blood-Venous  Preliminary Report (01-05-19 @ 08:01):    No growth to date.    Culture - Blood (collected 01-04-19 @ 07:48)  Source: .Blood Blood-Peripheral  Preliminary Report (01-05-19 @ 08:01):    No growth to date.    Culture - Blood (collected 01-03-19 @ 18:25)  Source: .Blood Blood-Peripheral  Preliminary Report (01-04-19 @ 19:01):    No growth to date.    Culture - Blood (collected 01-03-19 @ 18:25)  Source: .Blood Blood-Peripheral  Preliminary Report (01-04-19 @ 19:01):    No growth to date.    Culture - Blood (collected 01-02-19 @ 18:33)  Source: .Blood Blood-Peripheral  Gram Stain (01-07-19 @ 11:46):    Growth in aerobic bottle: Gram Negative Rods    Growth in anaerobic bottle: Gram Negative Rods  Final Report (01-07-19 @ 11:46):    Growth in aerobic bottle: Klebsiella pneumoniae    See previous culture [id number]    Growth in anaerobic bottle: Gram Negative Rods    Culture - Blood (collected 01-02-19 @ 18:32)  Source: .Blood Blood-Peripheral  Gram Stain (01-03-19 @ 15:40):    Growth in aerobic and anaerobic bottles: Gram Negative Rods  Final Report (01-05-19 @ 11:36):    Growth in aerobic and anaerobic bottles: Klebsiella pneumoniae    "Due to technical problems, Proteus sp. will Not be reported as part of    the BCID panel until further notice"    ***Blood Panel PCR results on this specimen are available    approximately 3 hours after the Gram stain result.***    Gram stain, PCR, and/or culture results may not always    correspond due to difference in methodologies.    ************************************************************    This PCR assay was performed using [employer]    The following targets are tested for: Enterococcus,    vancomycin resistant enterococci, Listeria monocytogenes,    coagulase negative staphylococci, S. aureus,    methicillin resistant S. aureus, Streptococcus agalactiae    (Group B), S. pneumoniae, S. pyogenes (Group A),    Acinetobacter baumannii, Enterobacter cloacae, E. coli,    Klebsiella oxytoca, K. pneumoniae, Proteus sp.,    Serratia marcescens, Haemophilus influenzae,    Neisseria meningitidis, Pseudomonas aeruginosa, Candida    albicans, C. glabrata, C krusei, C parapsilosis,    C. tropicalis and the KPC resistance gene.  Organism: Blood Culture PCR  Klebsiella pneumoniae (01-05-19 @ 11:36)  Organism: Klebsiella pneumoniae (01-05-19 @ 11:36)      -  Amikacin: S <=8      -  Ampicillin: R >16 These ampicillin results predict results for amoxicillin      -  Ampicillin/Sulbactam: R >16/8      -  Aztreonam: S <=4      -  Cefazolin: R 16      -  Cefepime: S <=2      -  Cefoxitin: S <=4      -  Ceftriaxone: S <=1 Enterobacter, Citrobacter, and Serratia may develop resistance during prolonged therapy      -  Ciprofloxacin: S <=0.5      -  Ertapenem: S <=0.5      -  Gentamicin: S <=1      -  Imipenem: S <=1      -  Levofloxacin: S <=1      -  Meropenem: S <=1      -  Piperacillin/Tazobactam: I 32      -  Tobramycin: S <=2      -  Trimethoprim/Sulfamethoxazole: S <=0.5/9.5      Method Type: KOREY  Organism: Blood Culture PCR (01-05-19 @ 11:36)      -  Klebsiella pneumoniae: Detec      Method Type: PCR        RADIOLOGY & ADDITIONAL TESTS:    Personally reviewed.     Consultant(s) Notes Reviewed:  [x] YES  [ ] NO Patient is a 68y old  Female who presents with a chief complaint of abd pain, vomiting (07 Jan 2019 13:44)      INTERVAL HPI/OVERNIGHT EVENTS: Patient seen and examined at bedside. Patient denied any CP, SOB, abdominal pain, N/V. Admitted to loose BM, however unable to describe her BM as she had not seen it. Per RN, BM this morning was softer, but not watery and without foul odor. Per tele monitor, patient had 3 beats of NSVT overnight.    MEDICATIONS  (STANDING):  aspirin enteric coated 81 milliGRAM(s) Oral daily  atorvastatin 80 milliGRAM(s) Oral at bedtime  cefTRIAXone   IVPB 1 Gram(s) IV Intermittent every 24 hours  dextrose 5%. 1000 milliLiter(s) (50 mL/Hr) IV Continuous <Continuous>  dextrose 50% Injectable 12.5 Gram(s) IV Push once  dextrose 50% Injectable 25 Gram(s) IV Push once  dextrose 50% Injectable 25 Gram(s) IV Push once  docusate sodium 100 milliGRAM(s) Oral daily  gabapentin 300 milliGRAM(s) Oral two times a day  heparin  Injectable 5000 Unit(s) SubCutaneous every 8 hours  insulin lispro (HumaLOG) corrective regimen sliding scale   SubCutaneous three times a day before meals  insulin lispro (HumaLOG) corrective regimen sliding scale   SubCutaneous at bedtime  isosorbide   mononitrate ER Tablet (IMDUR) 30 milliGRAM(s) Oral at bedtime  isosorbide   mononitrate ER Tablet (IMDUR) 60 milliGRAM(s) Oral daily  lactobacillus acidophilus 1 Tablet(s) Oral three times a day with meals  metoprolol tartrate 50 milliGRAM(s) Oral two times a day  metroNIDAZOLE  IVPB 500 milliGRAM(s) IV Intermittent every 8 hours  NIFEdipine XL 60 milliGRAM(s) Oral daily  potassium phosphate / sodium phosphate powder 1 Packet(s) Oral every 4 hours  senna 2 Tablet(s) Oral at bedtime    MEDICATIONS  (PRN):  dextrose 40% Gel 15 Gram(s) Oral once PRN Blood Glucose LESS THAN 70 milliGRAM(s)/deciliter  glucagon  Injectable 1 milliGRAM(s) IntraMuscular once PRN Glucose LESS THAN 70 milligrams/deciliter  HYDROmorphone  Injectable 0.5 milliGRAM(s) IV Push every 6 hours PRN Moderate Pain (4 - 6)  HYDROmorphone  Injectable 1 milliGRAM(s) IV Push every 8 hours PRN Severe Pain (7 - 10)      Allergies    ertapenem (Urticaria)  Purell (Rash)    Intolerances        REVIEW OF SYSTEMS:  CONSTITUTIONAL: No fever or chills  HEENT: No headache, no sore throat  RESPIRATORY: No cough, wheezing, or shortness of breath  CARDIOVASCULAR: No chest pain, palpitations, or leg swelling  GASTROINTESTINAL: No abd pain, nausea, vomiting; +loose BMs  GENITOURINARY: No dysuria, frequency, or hematuria  NEUROLOGICAL: No focal weakness or dizziness  MUSCULOSKELETAL: No myalgias     Vital Signs Last 24 Hrs  T(C): 36.3 (07 Jan 2019 08:04), Max: 37 (06 Jan 2019 20:21)  T(F): 97.4 (07 Jan 2019 08:04), Max: 98.6 (06 Jan 2019 20:21)  HR: 70 (07 Jan 2019 08:04) (70 - 97)  BP: 128/74 (07 Jan 2019 08:04) (108/63 - 166/85)  BP(mean): --  RR: 18 (07 Jan 2019 08:04) (17 - 18)  SpO2: 100% (07 Jan 2019 08:04) (94% - 100%)    PHYSICAL EXAM:  GENERAL: NAD  HEENT: EOMI, moist mucous membranes  CHEST/LUNG: CTA b/l, no rales, wheezes, or rhonchi  HEART: RRR, S1, S2  ABDOMEN: BS+, soft, nontender, nondistended  EXTREMITIES: No edema, cyanosis, or calf tenderness  NERVOUS SYSTEM: answering questions and following commands    LABS:                        10.4   11.79 )-----------( 243      ( 07 Jan 2019 06:51 )             33.2     CBC Full  -  ( 07 Jan 2019 06:51 )  WBC Count : 11.79 K/uL  Hemoglobin : 10.4 g/dL  Hematocrit : 33.2 %  Platelet Count - Automated : 243 K/uL  Mean Cell Volume : 92.2 fl  Mean Cell Hemoglobin : 28.9 pg  Mean Cell Hemoglobin Concentration : 31.3 gm/dL  Auto Neutrophil # : x  Auto Lymphocyte # : x  Auto Monocyte # : x  Auto Eosinophil # : x  Auto Basophil # : x  Auto Neutrophil % : x  Auto Lymphocyte % : x  Auto Monocyte % : x  Auto Eosinophil % : x  Auto Basophil % : x    07 Jan 2019 06:51    138    |  100    |  20     ----------------------------<  152    3.6     |  31     |  4.80     Ca    7.4        07 Jan 2019 06:51  Phos  2.1       07 Jan 2019 06:51  Mg     2.0       07 Jan 2019 06:51    TPro  6.3    /  Alb  1.1    /  TBili  0.5    /  DBili  x      /  AST  56     /  ALT  35     /  AlkPhos  370    07 Jan 2019 06:51      CAPILLARY BLOOD GLUCOSE  POCT Blood Glucose.: 170 mg/dL (07 Jan 2019 12:09)  POCT Blood Glucose.: 150 mg/dL (07 Jan 2019 07:52)  POCT Blood Glucose.: 184 mg/dL (06 Jan 2019 22:25)  POCT Blood Glucose.: 132 mg/dL (06 Jan 2019 16:58)        Culture - Blood (collected 01-04-19 @ 07:48)  Source: .Blood Blood-Venous  Preliminary Report (01-05-19 @ 08:01):    No growth to date.    Culture - Blood (collected 01-04-19 @ 07:48)  Source: .Blood Blood-Peripheral  Preliminary Report (01-05-19 @ 08:01):    No growth to date.    Culture - Blood (collected 01-03-19 @ 18:25)  Source: .Blood Blood-Peripheral  Preliminary Report (01-04-19 @ 19:01):    No growth to date.    Culture - Blood (collected 01-03-19 @ 18:25)  Source: .Blood Blood-Peripheral  Preliminary Report (01-04-19 @ 19:01):    No growth to date.    Culture - Blood (collected 01-02-19 @ 18:33)  Source: .Blood Blood-Peripheral  Gram Stain (01-07-19 @ 11:46):    Growth in aerobic bottle: Gram Negative Rods    Growth in anaerobic bottle: Gram Negative Rods  Final Report (01-07-19 @ 11:46):    Growth in aerobic bottle: Klebsiella pneumoniae    See previous culture [id number]    Growth in anaerobic bottle: Gram Negative Rods    Culture - Blood (collected 01-02-19 @ 18:32)  Source: .Blood Blood-Peripheral  Gram Stain (01-03-19 @ 15:40):    Growth in aerobic and anaerobic bottles: Gram Negative Rods  Final Report (01-05-19 @ 11:36):    Growth in aerobic and anaerobic bottles: Klebsiella pneumoniae    "Due to technical problems, Proteus sp. will Not be reported as part of    the BCID panel until further notice"    ***Blood Panel PCR results on this specimen are available    approximately 3 hours after the Gram stain result.***    Gram stain, PCR, and/or culture results may not always    correspond due to difference in methodologies.    ************************************************************    This PCR assay was performed using [employer].    The following targets are tested for: Enterococcus,    vancomycin resistant enterococci, Listeria monocytogenes,    coagulase negative staphylococci, S. aureus,    methicillin resistant S. aureus, Streptococcus agalactiae    (Group B), S. pneumoniae, S. pyogenes (Group A),    Acinetobacter baumannii, Enterobacter cloacae, E. coli,    Klebsiella oxytoca, K. pneumoniae, Proteus sp.,    Serratia marcescens, Haemophilus influenzae,    Neisseria meningitidis, Pseudomonas aeruginosa, Candida    albicans, C. glabrata, C krusei, C parapsilosis,    C. tropicalis and the KPC resistance gene.  Organism: Blood Culture PCR  Klebsiella pneumoniae (01-05-19 @ 11:36)  Organism: Klebsiella pneumoniae (01-05-19 @ 11:36)      -  Amikacin: S <=8      -  Ampicillin: R >16 These ampicillin results predict results for amoxicillin      -  Ampicillin/Sulbactam: R >16/8      -  Aztreonam: S <=4      -  Cefazolin: R 16      -  Cefepime: S <=2      -  Cefoxitin: S <=4      -  Ceftriaxone: S <=1 Enterobacter, Citrobacter, and Serratia may develop resistance during prolonged therapy      -  Ciprofloxacin: S <=0.5      -  Ertapenem: S <=0.5      -  Gentamicin: S <=1      -  Imipenem: S <=1      -  Levofloxacin: S <=1      -  Meropenem: S <=1      -  Piperacillin/Tazobactam: I 32      -  Tobramycin: S <=2      -  Trimethoprim/Sulfamethoxazole: S <=0.5/9.5      Method Type: KOREY  Organism: Blood Culture PCR (01-05-19 @ 11:36)      -  Klebsiella pneumoniae: Detec      Method Type: PCR        RADIOLOGY & ADDITIONAL TESTS:    Personally reviewed.     Consultant(s) Notes Reviewed:  [x] YES  [ ] NO

## 2019-01-07 NOTE — PROGRESS NOTE ADULT - ASSESSMENT
IMPRESSION with normal HIDA scan and no RUQ pain, not acute cholecystitis  PLAN would not rush to Surgery, wait till albumin is improved to take to OR

## 2019-01-07 NOTE — PROGRESS NOTE ADULT - PROBLEM SELECTOR PLAN 1
Pt met sepsis criteria (fever, leukocytosis, source) on admission, suspect 2/2 acute on chronic cholecystitis   - CT Chest/Abd/Pel showed possible acute kevin on chronic kevin, decreased appearance of liver abscess - small abscess remains.  - Lactate downtrended to wnl.  - Continue pain regimen and Tylenol PRN for fever.  - Leukocytosis trending down  - BCx positive for Klebsiella -- repeat cultures negative so far   - On IV Zosyn cultures cleared, but sensitivities came back as intermediate to zosyn so continue IV Rocephin and Flagyl (started 1/6) per ID (the flagyl given since pt had liver abscess and in those situations broader coverage recommended)

## 2019-01-07 NOTE — PROGRESS NOTE ADULT - PROBLEM SELECTOR PLAN 2
Suspect acute on chronic per CT and symptoms on admission --- HIDA r/out acute kevin at the time of the scan so perhaps pt had obstruction that cleared by time of HIDA  - Continue IV Rocephin and Flagyl as per ID.  - Per Gen Surg (Dr. Campos), will hold off on cholecystectomy for now.  - Per GI (Dr. Camacho), CBD stent still in place and will need to be removed after lap kevin.

## 2019-01-07 NOTE — PROGRESS NOTE ADULT - ASSESSMENT
69 yo F with PMH of ESRD on HD (M, W, F), CAD s/p stents 2007, DM, HTN, MI, biliary stent, Liver abscess who presents to the ED with abdominal pain x2 days, 4 episodes of vomiting, leukocytosis and Klebsiella bacteremia

## 2019-01-08 DIAGNOSIS — R19.5 OTHER FECAL ABNORMALITIES: ICD-10-CM

## 2019-01-08 LAB
ALBUMIN SERPL ELPH-MCNC: 1.1 G/DL — LOW (ref 3.3–5)
ALP SERPL-CCNC: 309 U/L — HIGH (ref 40–120)
ALT FLD-CCNC: 33 U/L — SIGNIFICANT CHANGE UP (ref 12–78)
ANION GAP SERPL CALC-SCNC: 9 MMOL/L — SIGNIFICANT CHANGE UP (ref 5–17)
AST SERPL-CCNC: 57 U/L — HIGH (ref 15–37)
BILIRUB SERPL-MCNC: 0.4 MG/DL — SIGNIFICANT CHANGE UP (ref 0.2–1.2)
BUN SERPL-MCNC: 25 MG/DL — HIGH (ref 7–23)
CALCIUM SERPL-MCNC: 7.4 MG/DL — LOW (ref 8.5–10.1)
CHLORIDE SERPL-SCNC: 100 MMOL/L — SIGNIFICANT CHANGE UP (ref 96–108)
CO2 SERPL-SCNC: 29 MMOL/L — SIGNIFICANT CHANGE UP (ref 22–31)
CREAT SERPL-MCNC: 5.6 MG/DL — HIGH (ref 0.5–1.3)
CULTURE RESULTS: SIGNIFICANT CHANGE UP
GLUCOSE SERPL-MCNC: 109 MG/DL — HIGH (ref 70–99)
HCT VFR BLD CALC: 33.7 % — LOW (ref 34.5–45)
HGB BLD-MCNC: 10.3 G/DL — LOW (ref 11.5–15.5)
MCHC RBC-ENTMCNC: 28.8 PG — SIGNIFICANT CHANGE UP (ref 27–34)
MCHC RBC-ENTMCNC: 30.6 GM/DL — LOW (ref 32–36)
MCV RBC AUTO: 94.1 FL — SIGNIFICANT CHANGE UP (ref 80–100)
NRBC # BLD: 0 /100 WBCS — SIGNIFICANT CHANGE UP (ref 0–0)
PLATELET # BLD AUTO: 253 K/UL — SIGNIFICANT CHANGE UP (ref 150–400)
POTASSIUM SERPL-MCNC: 3.8 MMOL/L — SIGNIFICANT CHANGE UP (ref 3.5–5.3)
POTASSIUM SERPL-SCNC: 3.8 MMOL/L — SIGNIFICANT CHANGE UP (ref 3.5–5.3)
PROT SERPL-MCNC: 6.2 G/DL — SIGNIFICANT CHANGE UP (ref 6–8.3)
RBC # BLD: 3.58 M/UL — LOW (ref 3.8–5.2)
RBC # FLD: 20.8 % — HIGH (ref 10.3–14.5)
SODIUM SERPL-SCNC: 138 MMOL/L — SIGNIFICANT CHANGE UP (ref 135–145)
SPECIMEN SOURCE: SIGNIFICANT CHANGE UP
WBC # BLD: 12.31 K/UL — HIGH (ref 3.8–10.5)
WBC # FLD AUTO: 12.31 K/UL — HIGH (ref 3.8–10.5)

## 2019-01-08 PROCEDURE — 99233 SBSQ HOSP IP/OBS HIGH 50: CPT | Mod: GC

## 2019-01-08 PROCEDURE — 99232 SBSQ HOSP IP/OBS MODERATE 35: CPT

## 2019-01-08 RX ORDER — CHOLESTYRAMINE 4 G/9G
4 POWDER, FOR SUSPENSION ORAL DAILY
Qty: 0 | Refills: 0 | Status: DISCONTINUED | OUTPATIENT
Start: 2019-01-08 | End: 2019-01-09

## 2019-01-08 RX ADMIN — HEPARIN SODIUM 5000 UNIT(S): 5000 INJECTION INTRAVENOUS; SUBCUTANEOUS at 05:40

## 2019-01-08 RX ADMIN — CEFTRIAXONE 100 GRAM(S): 500 INJECTION, POWDER, FOR SOLUTION INTRAMUSCULAR; INTRAVENOUS at 18:13

## 2019-01-08 RX ADMIN — ISOSORBIDE MONONITRATE 30 MILLIGRAM(S): 60 TABLET, EXTENDED RELEASE ORAL at 21:16

## 2019-01-08 RX ADMIN — Medication 50 MILLIGRAM(S): at 18:13

## 2019-01-08 RX ADMIN — HEPARIN SODIUM 5000 UNIT(S): 5000 INJECTION INTRAVENOUS; SUBCUTANEOUS at 16:13

## 2019-01-08 RX ADMIN — Medication 100 MILLIGRAM(S): at 05:41

## 2019-01-08 RX ADMIN — Medication 100 MILLIGRAM(S): at 21:16

## 2019-01-08 RX ADMIN — Medication 1: at 18:03

## 2019-01-08 RX ADMIN — Medication 1 TABLET(S): at 21:16

## 2019-01-08 RX ADMIN — GABAPENTIN 300 MILLIGRAM(S): 400 CAPSULE ORAL at 05:40

## 2019-01-08 RX ADMIN — Medication 1 TABLET(S): at 16:12

## 2019-01-08 RX ADMIN — HEPARIN SODIUM 5000 UNIT(S): 5000 INJECTION INTRAVENOUS; SUBCUTANEOUS at 21:16

## 2019-01-08 RX ADMIN — Medication 60 MILLIGRAM(S): at 05:40

## 2019-01-08 RX ADMIN — ISOSORBIDE MONONITRATE 60 MILLIGRAM(S): 60 TABLET, EXTENDED RELEASE ORAL at 16:12

## 2019-01-08 RX ADMIN — Medication 50 MILLIGRAM(S): at 05:41

## 2019-01-08 RX ADMIN — Medication 81 MILLIGRAM(S): at 16:11

## 2019-01-08 RX ADMIN — ATORVASTATIN CALCIUM 80 MILLIGRAM(S): 80 TABLET, FILM COATED ORAL at 21:16

## 2019-01-08 RX ADMIN — Medication 100 MILLIGRAM(S): at 16:13

## 2019-01-08 RX ADMIN — GABAPENTIN 300 MILLIGRAM(S): 400 CAPSULE ORAL at 18:13

## 2019-01-08 NOTE — PROGRESS NOTE ADULT - ASSESSMENT
68 female with a history of DM. PVD, HTN, CAD, CHF, Cirrhosis and ESRD On HD now admitted with abdominal pain. Found to have klebsiella sepsis and is on IV antibiotics. Stable dialysis via perma cath. Heparin free dialysis. Will follow.

## 2019-01-08 NOTE — PROGRESS NOTE ADULT - SUBJECTIVE AND OBJECTIVE BOX
pt seen  c/o diarrhea  ICU Vital Signs Last 24 Hrs  T(C): 36.8 (08 Jan 2019 11:10), Max: 37.1 (08 Jan 2019 08:21)  T(F): 98.2 (08 Jan 2019 11:10), Max: 98.7 (08 Jan 2019 08:21)  HR: 81 (08 Jan 2019 11:10) (68 - 89)  BP: 158/91 (08 Jan 2019 11:10) (116/73 - 160/78)  BP(mean): --  ABP: --  ABP(mean): --  RR: 18 (08 Jan 2019 11:10) (18 - 18)  SpO2: 100% (08 Jan 2019 11:10) (98% - 100%)  gen-NAD  resp-clear  abd-soft NT/ND                        10.3   12.31 )-----------( 253      ( 08 Jan 2019 06:22 )             33.7   01-08    138  |  100  |  25<H>  ----------------------------<  109<H>  3.8   |  29  |  5.60<H>    Ca    7.4<L>      08 Jan 2019 06:22  Phos  2.1     01-07  Mg     2.0     01-07    TPro  6.2  /  Alb  1.1<L>  /  TBili  0.4  /  DBili  x   /  AST  57<H>  /  ALT  33  /  AlkPhos  309<H>  01-08

## 2019-01-08 NOTE — PROGRESS NOTE ADULT - ASSESSMENT
69 yo F with PMH of ESRD on HD (M, W, F), CAD s/p stents 2007, DM, HTN, MI, biliary stent, Liver abscess who presents to the ED with abdominal pain x2 days, 4 episodes of vomiting, leukocytosis and Klebsiella bacteremia  Source not defined but my concern remains that it is related to the gallbaldder.  Patient's exam now benign  Question whether she may be intermittently obstructing her cystic duct, causing episodes of pain.  She is clearly at risk for complications related to surgery in her current state  However she now has had 3 episodes of septicemia in the last 2 months or so.   My concern is that the GB remains the primary issue and without addressing it she will be at very high risk for another episode of sepsis in the near future.  We have not been able to optimize her to the point where she is a good candidate fo surgery, and I am not optimistic that we will get her there before her next infection  Discussed with Dr. Lambert, second surgical opinion planned.    Now with diarrhea, several patients with norovirus on floor. WBC remains elevated, but abdominal exam benign less likely C. difficile.

## 2019-01-08 NOTE — PROGRESS NOTE ADULT - ASSESSMENT
67 yo with current resolved abdominal pain. HIDA negative but had GB issues in past.      PT will benefit from lap kevin but when better surgically optimized    cont current care     encourage PO intake and consider nutritional supplementation

## 2019-01-08 NOTE — PROGRESS NOTE ADULT - ASSESSMENT
69 yo F with PMH of ESRD on HD (M, W, F) via permacath started in June 2018, CAD s/p 1 stent 2007, DM type 2, HTN, MI, biliary stent, Liver abscess, obesity who presents to the ED with abdominal pain x2 days, 4 episodes of vomiting. Currently admitted for sepsis 2/2 acute on chronic cholecystitis vs failed treatment of liver abscess. Patient had recent admission at [facility] November 2018 for acute cholecystitis causing sepsis, S/P Perc Noa 10/30 and ESBL E. coli bacteremia. She was treated with IV ertapenem and developed A fIb with RVR. ERCP was attempted at [facility] but was unsuccessful and was transferred to [facility] where ERCP was performed with stone extraction and stent placement. She also had another admission in November 2018 at [place] shortly after for acute pancreatitis. Patient has been in rehab s/p last discharge from [facility]. Overnight with several episodes of vomiting. Possibly for choly today    - No clear evidence of acute ischemia.  Monitor closely for the development of anginal symptoms or clinical signs of ischemia as patient has history of CAD s/p stent  - Continue asa, Statin, Imdur with hx of CAD s/p 1 stent in 2007. Has been on dual antiplatelet therapy since.  Outpatient cardiologist is Dr Rachel Black in [place].   - Continue to hold Plavix for possible surgical intervention; can resume when cleared by surgery, but this is certainly not obligatory if bleeding is felt to be a concern  - HIDA scan negative.  Per Surgery, no urgent need for noa.  Awaiting Bili to improve    - No acute changes on EKG compared to previous.  - No meaningful evidence of volume overload.  No significant cardiac murmurs and no cardiac arrhythmias per tele.  Patient is a moderate risk and remains optimized for a non-cardiac procedure.  Please continue routine hemodynamics  - Continue Procardia XL for BP control.  Continue metoprolol  - Monitor and replete lytes, keep K>4, Mg>2.    - Other cardiovascular workup will depend on clinical course.  - All other workup per primary team.  - Will continue to follow.      Lenore Long NP  Cardiology 67 yo F with PMH of ESRD on HD (M, W, F) via permacath started in June 2018, CAD s/p 1 stent 2007, DM type 2, HTN, MI, biliary stent, Liver abscess, obesity who presents to the ED with abdominal pain x2 days, 4 episodes of vomiting. Currently admitted for sepsis 2/2 acute on chronic cholecystitis vs failed treatment of liver abscess. Patient had recent admission at [facility] November 2018 for acute cholecystitis causing sepsis, S/P Perc Noa 10/30 and ESBL E. coli bacteremia. She was treated with IV ertapenem and developed A fIb with RVR. ERCP was attempted at [facility] but was unsuccessful and was transferred to [facility] where ERCP was performed with stone extraction and stent placement. She also had another admission in November 2018 at [place] shortly after for acute pancreatitis. Patient has been in rehab s/p last discharge from [facility]. Overnight with several episodes of vomiting. Possibly for choly today    - No clear evidence of acute ischemia.  Monitor closely for the development of anginal symptoms or clinical signs of ischemia as patient has history of CAD s/p stent  - Continue asa, Statin, Imdur with hx of CAD s/p 1 stent in 2007. Has been on dual antiplatelet therapy since.  Outpatient cardiologist is Dr Rachel Black in [place].   - Continue to hold Plavix for possible surgical intervention; can resume when cleared by surgery, but this is certainly not obligatory if bleeding is felt to be a concern  - HIDA scan negative.  Per Surgery, no urgent need for noa.  Awaiting Bili to improve.   - GI f/u for diarrhea    - No acute changes on EKG compared to previous.  - No meaningful evidence of volume overload.  No significant cardiac murmurs and no cardiac arrhythmias per tele.  Patient is a moderate risk and remains optimized for a non-cardiac procedure.  Please continue routine hemodynamics  - Continue Procardia XL for BP control.  Continue metoprolol  - Monitor and replete lytes, keep K>4, Mg>2.    - Other cardiovascular workup will depend on clinical course.  - All other workup per primary team.  - Will continue to follow.      Lenore Long NP  Cardiology

## 2019-01-08 NOTE — PROGRESS NOTE ADULT - PROBLEM SELECTOR PLAN 1
acute on chronic  hida noted, neg for acute kevin  klebsiella bacteremia, repeat cx ngtd  cont abx as per id  diet as tolerated  further plans as per surgery, 2nd opinion pending  trend lfts  cbd stent still in place, will need eventual removal after cholecystectomy   will follow

## 2019-01-08 NOTE — PROGRESS NOTE ADULT - PROBLEM SELECTOR PLAN 3
dc bowel regimen  cont bacid  if persists rec to send stool studies  monitor stool frequency/consistency

## 2019-01-08 NOTE — PROGRESS NOTE ADULT - PROBLEM SELECTOR PLAN 3
Pt has PICC in place and course of Invanz/Tigecycline as outpt  - Seems to be getting smaller on CT.  - Continue to treat Klebsiella bacteremia with Rocephin/Flagyl.

## 2019-01-08 NOTE — PROGRESS NOTE ADULT - PROBLEM SELECTOR PLAN 5
s/p stent 2007  - Continue home dose atorvastatin, metoprolol (with hold parameters) and aspirin.  - Holding Plavix for possible lap kevin.

## 2019-01-08 NOTE — PROGRESS NOTE ADULT - SUBJECTIVE AND OBJECTIVE BOX
Patient is a 68y old  Female who presents with a chief complaint of abd pain, vomiting (08 Jan 2019 12:42)      INTERVAL HPI/OVERNIGHT EVENTS: Patient seen and examined at bedside. Patient denied any CP, SOB, abdominal pain or N/V. Complained of weakness and continued diarrhea.    MEDICATIONS  (STANDING):  aspirin enteric coated 81 milliGRAM(s) Oral daily  atorvastatin 80 milliGRAM(s) Oral at bedtime  cefTRIAXone   IVPB 1 Gram(s) IV Intermittent every 24 hours  cholestyramine Powder (Sugar-Free) 4 Gram(s) Oral daily  dextrose 5%. 1000 milliLiter(s) (50 mL/Hr) IV Continuous <Continuous>  dextrose 50% Injectable 12.5 Gram(s) IV Push once  dextrose 50% Injectable 25 Gram(s) IV Push once  dextrose 50% Injectable 25 Gram(s) IV Push once  gabapentin 300 milliGRAM(s) Oral two times a day  heparin  Injectable 5000 Unit(s) SubCutaneous every 8 hours  insulin lispro (HumaLOG) corrective regimen sliding scale   SubCutaneous three times a day before meals  insulin lispro (HumaLOG) corrective regimen sliding scale   SubCutaneous at bedtime  isosorbide   mononitrate ER Tablet (IMDUR) 30 milliGRAM(s) Oral at bedtime  isosorbide   mononitrate ER Tablet (IMDUR) 60 milliGRAM(s) Oral daily  lactobacillus acidophilus 1 Tablet(s) Oral three times a day with meals  metoprolol tartrate 50 milliGRAM(s) Oral two times a day  metroNIDAZOLE  IVPB 500 milliGRAM(s) IV Intermittent every 8 hours  NIFEdipine XL 60 milliGRAM(s) Oral daily    MEDICATIONS  (PRN):  dextrose 40% Gel 15 Gram(s) Oral once PRN Blood Glucose LESS THAN 70 milliGRAM(s)/deciliter  glucagon  Injectable 1 milliGRAM(s) IntraMuscular once PRN Glucose LESS THAN 70 milligrams/deciliter      Allergies    ertapenem (Urticaria)  Purell (Rash)    Intolerances      REVIEW OF SYSTEMS:  CONSTITUTIONAL: No fever or chills, +weakness  HEENT: No headache, no sore throat  RESPIRATORY: No cough, wheezing, or shortness of breath  CARDIOVASCULAR: No chest pain, palpitations, or leg swelling  GASTROINTESTINAL: No abd pain, nausea, vomiting; +loose BMs  GENITOURINARY: No dysuria, frequency, or hematuria  NEUROLOGICAL: No focal weakness or dizziness  MUSCULOSKELETAL: No myalgias     Vital Signs Last 24 Hrs  T(C): 36.8 (08 Jan 2019 11:10), Max: 37.1 (08 Jan 2019 08:21)  T(F): 98.2 (08 Jan 2019 11:10), Max: 98.7 (08 Jan 2019 08:21)  HR: 81 (08 Jan 2019 11:10) (68 - 89)  BP: 158/91 (08 Jan 2019 11:10) (116/73 - 160/78)  BP(mean): --  RR: 18 (08 Jan 2019 11:10) (18 - 18)  SpO2: 100% (08 Jan 2019 11:10) (98% - 100%)    PHYSICAL EXAM:  GENERAL: NAD  HEENT: EOMI, moist mucous membranes  CHEST/LUNG: CTA b/l, no rales, wheezes, or rhonchi  HEART: RRR, S1, S2  ABDOMEN: BS+, soft, nontender, nondistended  EXTREMITIES: No edema, cyanosis, or calf tenderness  NERVOUS SYSTEM: answering questions and following commands    LABS:                        10.3   12.31 )-----------( 253      ( 08 Jan 2019 06:22 )             33.7     CBC Full  -  ( 08 Jan 2019 06:22 )  WBC Count : 12.31 K/uL  Hemoglobin : 10.3 g/dL  Hematocrit : 33.7 %  Platelet Count - Automated : 253 K/uL  Mean Cell Volume : 94.1 fl  Mean Cell Hemoglobin : 28.8 pg  Mean Cell Hemoglobin Concentration : 30.6 gm/dL  Auto Neutrophil # : x  Auto Lymphocyte # : x  Auto Monocyte # : x  Auto Eosinophil # : x  Auto Basophil # : x  Auto Neutrophil % : x  Auto Lymphocyte % : x  Auto Monocyte % : x  Auto Eosinophil % : x  Auto Basophil % : x    08 Jan 2019 06:22    138    |  100    |  25     ----------------------------<  109    3.8     |  29     |  5.60     Ca    7.4        08 Jan 2019 06:22    TPro  6.2    /  Alb  1.1    /  TBili  0.4    /  DBili  x      /  AST  57     /  ALT  33     /  AlkPhos  309    08 Jan 2019 06:22      CAPILLARY BLOOD GLUCOSE  POCT Blood Glucose.: 109 mg/dL (08 Jan 2019 11:43)  POCT Blood Glucose.: 98 mg/dL (08 Jan 2019 07:44)  POCT Blood Glucose.: 148 mg/dL (07 Jan 2019 22:47)  POCT Blood Glucose.: 167 mg/dL (07 Jan 2019 16:23)      Culture - Blood (collected 01-04-19 @ 07:48)  Source: .Blood Blood-Venous  Preliminary Report (01-05-19 @ 08:01):    No growth to date.    Culture - Blood (collected 01-04-19 @ 07:48)  Source: .Blood Blood-Peripheral  Preliminary Report (01-05-19 @ 08:01):    No growth to date.    Culture - Blood (collected 01-03-19 @ 18:25)  Source: .Blood Blood-Peripheral  Preliminary Report (01-04-19 @ 19:01):    No growth to date.    Culture - Blood (collected 01-03-19 @ 18:25)  Source: .Blood Blood-Peripheral  Preliminary Report (01-04-19 @ 19:01):    No growth to date.    Culture - Blood (collected 01-02-19 @ 18:33)  Source: .Blood Blood-Peripheral  Gram Stain (01-07-19 @ 11:46):    Growth in aerobic bottle: Gram Negative Rods    Growth in anaerobic bottle: Gram Negative Rods  Final Report (01-08-19 @ 10:16):    Growth in aerobic and anaerobic bottles: Klebsiella pneumoniae    See previous culture [id number]    Culture - Blood (collected 01-02-19 @ 18:32)  Source: .Blood Blood-Peripheral  Gram Stain (01-03-19 @ 15:40):    Growth in aerobic and anaerobic bottles: Gram Negative Rods  Final Report (01-05-19 @ 11:36):    Growth in aerobic and anaerobic bottles: Klebsiella pneumoniae    "Due to technical problems, Proteus sp. will Not be reported as part of    the BCID panel until further notice"    ***Blood Panel PCR results on this specimen are available    approximately 3 hours after the Gram stain result.***    Gram stain, PCR, and/or culture results may not always    correspond due to difference in methodologies.    ************************************************************    This PCR assay was performed using [id number].    The following targets are tested for: Enterococcus,    vancomycin resistant enterococci, Listeria monocytogenes,    coagulase negative staphylococci, S. aureus,    methicillin resistant S. aureus, Streptococcus agalactiae    (Group B), S. pneumoniae, S. pyogenes (Group A),    Acinetobacter baumannii, Enterobacter cloacae, E. coli,    Klebsiella oxytoca, K. pneumoniae, Proteus sp.,    Serratia marcescens, Haemophilus influenzae,    Neisseria meningitidis, Pseudomonas aeruginosa, Candida    albicans, C. glabrata, C krusei, C parapsilosis,    C. tropicalis and the KPC resistance gene.  Organism: Blood Culture PCR  Klebsiella pneumoniae (01-05-19 @ 11:36)  Organism: Klebsiella pneumoniae (01-05-19 @ 11:36)      -  Amikacin: S <=8      -  Ampicillin: R >16 These ampicillin results predict results for amoxicillin      -  Ampicillin/Sulbactam: R >16/8      -  Aztreonam: S <=4      -  Cefazolin: R 16      -  Cefepime: S <=2      -  Cefoxitin: S <=4      -  Ceftriaxone: S <=1 Enterobacter, Citrobacter, and Serratia may develop resistance during prolonged therapy      -  Ciprofloxacin: S <=0.5      -  Ertapenem: S <=0.5      -  Gentamicin: S <=1      -  Imipenem: S <=1      -  Levofloxacin: S <=1      -  Meropenem: S <=1      -  Piperacillin/Tazobactam: I 32      -  Tobramycin: S <=2      -  Trimethoprim/Sulfamethoxazole: S <=0.5/9.5      Method Type: KOREY  Organism: Blood Culture PCR (01-05-19 @ 11:36)      -  Klebsiella pneumoniae: Detec      Method Type: PCR        RADIOLOGY & ADDITIONAL TESTS:    Personally reviewed.     Consultant(s) Notes Reviewed:  [x] YES  [ ] NO Patient is a 68y old  Female who presents with a chief complaint of abd pain, vomiting (08 Jan 2019 12:42)      INTERVAL HPI/OVERNIGHT EVENTS: Patient seen and examined at bedside. Patient denied any CP, SOB, abdominal pain or N/V. Complained of weakness and continued diarrhea.    MEDICATIONS  (STANDING):  aspirin enteric coated 81 milliGRAM(s) Oral daily  atorvastatin 80 milliGRAM(s) Oral at bedtime  cefTRIAXone   IVPB 1 Gram(s) IV Intermittent every 24 hours  cholestyramine Powder (Sugar-Free) 4 Gram(s) Oral daily  dextrose 5%. 1000 milliLiter(s) (50 mL/Hr) IV Continuous <Continuous>  dextrose 50% Injectable 12.5 Gram(s) IV Push once  dextrose 50% Injectable 25 Gram(s) IV Push once  dextrose 50% Injectable 25 Gram(s) IV Push once  gabapentin 300 milliGRAM(s) Oral two times a day  heparin  Injectable 5000 Unit(s) SubCutaneous every 8 hours  insulin lispro (HumaLOG) corrective regimen sliding scale   SubCutaneous three times a day before meals  insulin lispro (HumaLOG) corrective regimen sliding scale   SubCutaneous at bedtime  isosorbide   mononitrate ER Tablet (IMDUR) 30 milliGRAM(s) Oral at bedtime  isosorbide   mononitrate ER Tablet (IMDUR) 60 milliGRAM(s) Oral daily  lactobacillus acidophilus 1 Tablet(s) Oral three times a day with meals  metoprolol tartrate 50 milliGRAM(s) Oral two times a day  metroNIDAZOLE  IVPB 500 milliGRAM(s) IV Intermittent every 8 hours  NIFEdipine XL 60 milliGRAM(s) Oral daily    MEDICATIONS  (PRN):  dextrose 40% Gel 15 Gram(s) Oral once PRN Blood Glucose LESS THAN 70 milliGRAM(s)/deciliter  glucagon  Injectable 1 milliGRAM(s) IntraMuscular once PRN Glucose LESS THAN 70 milligrams/deciliter      Allergies    ertapenem (Urticaria)  Purell (Rash)    Intolerances      REVIEW OF SYSTEMS:  CONSTITUTIONAL: No fever or chills, +weakness  HEENT: No headache, no sore throat  RESPIRATORY: No cough, wheezing, or shortness of breath  CARDIOVASCULAR: No chest pain, palpitations, or leg swelling  GASTROINTESTINAL: No abd pain, nausea, vomiting; +diarrhea  GENITOURINARY: No dysuria, frequency, or hematuria  NEUROLOGICAL: No focal weakness or dizziness  MUSCULOSKELETAL: No myalgias     Vital Signs Last 24 Hrs  T(C): 36.8 (08 Jan 2019 11:10), Max: 37.1 (08 Jan 2019 08:21)  T(F): 98.2 (08 Jan 2019 11:10), Max: 98.7 (08 Jan 2019 08:21)  HR: 81 (08 Jan 2019 11:10) (68 - 89)  BP: 158/91 (08 Jan 2019 11:10) (116/73 - 160/78)  BP(mean): --  RR: 18 (08 Jan 2019 11:10) (18 - 18)  SpO2: 100% (08 Jan 2019 11:10) (98% - 100%)    PHYSICAL EXAM:  GENERAL: NAD  HEENT: EOMI, moist mucous membranes  CHEST/LUNG: CTA b/l, no rales, wheezes, or rhonchi  HEART: RRR, S1, S2  ABDOMEN: BS+, soft, nontender, nondistended  EXTREMITIES: No edema, cyanosis, or calf tenderness  NERVOUS SYSTEM: answering questions and following commands    LABS:                        10.3   12.31 )-----------( 253      ( 08 Jan 2019 06:22 )             33.7     CBC Full  -  ( 08 Jan 2019 06:22 )  WBC Count : 12.31 K/uL  Hemoglobin : 10.3 g/dL  Hematocrit : 33.7 %  Platelet Count - Automated : 253 K/uL  Mean Cell Volume : 94.1 fl  Mean Cell Hemoglobin : 28.8 pg  Mean Cell Hemoglobin Concentration : 30.6 gm/dL  Auto Neutrophil # : x  Auto Lymphocyte # : x  Auto Monocyte # : x  Auto Eosinophil # : x  Auto Basophil # : x  Auto Neutrophil % : x  Auto Lymphocyte % : x  Auto Monocyte % : x  Auto Eosinophil % : x  Auto Basophil % : x    08 Jan 2019 06:22    138    |  100    |  25     ----------------------------<  109    3.8     |  29     |  5.60     Ca    7.4        08 Jan 2019 06:22    TPro  6.2    /  Alb  1.1    /  TBili  0.4    /  DBili  x      /  AST  57     /  ALT  33     /  AlkPhos  309    08 Jan 2019 06:22      CAPILLARY BLOOD GLUCOSE  POCT Blood Glucose.: 109 mg/dL (08 Jan 2019 11:43)  POCT Blood Glucose.: 98 mg/dL (08 Jan 2019 07:44)  POCT Blood Glucose.: 148 mg/dL (07 Jan 2019 22:47)  POCT Blood Glucose.: 167 mg/dL (07 Jan 2019 16:23)      Culture - Blood (collected 01-04-19 @ 07:48)  Source: .Blood Blood-Venous  Preliminary Report (01-05-19 @ 08:01):    No growth to date.    Culture - Blood (collected 01-04-19 @ 07:48)  Source: .Blood Blood-Peripheral  Preliminary Report (01-05-19 @ 08:01):    No growth to date.    Culture - Blood (collected 01-03-19 @ 18:25)  Source: .Blood Blood-Peripheral  Preliminary Report (01-04-19 @ 19:01):    No growth to date.    Culture - Blood (collected 01-03-19 @ 18:25)  Source: .Blood Blood-Peripheral  Preliminary Report (01-04-19 @ 19:01):    No growth to date.    Culture - Blood (collected 01-02-19 @ 18:33)  Source: .Blood Blood-Peripheral  Gram Stain (01-07-19 @ 11:46):    Growth in aerobic bottle: Gram Negative Rods    Growth in anaerobic bottle: Gram Negative Rods  Final Report (01-08-19 @ 10:16):    Growth in aerobic and anaerobic bottles: Klebsiella pneumoniae    See previous culture [id number]    Culture - Blood (collected 01-02-19 @ 18:32)  Source: .Blood Blood-Peripheral  Gram Stain (01-03-19 @ 15:40):    Growth in aerobic and anaerobic bottles: Gram Negative Rods  Final Report (01-05-19 @ 11:36):    Growth in aerobic and anaerobic bottles: Klebsiella pneumoniae    "Due to technical problems, Proteus sp. will Not be reported as part of    the BCID panel until further notice"    ***Blood Panel PCR results on this specimen are available    approximately 3 hours after the Gram stain result.***    Gram stain, PCR, and/or culture results may not always    correspond due to difference in methodologies.    ************************************************************    This PCR assay was performed using [employer].    The following targets are tested for: Enterococcus,    vancomycin resistant enterococci, Listeria monocytogenes,    coagulase negative staphylococci, S. aureus,    methicillin resistant S. aureus, Streptococcus agalactiae    (Group B), S. pneumoniae, S. pyogenes (Group A),    Acinetobacter baumannii, Enterobacter cloacae, E. coli,    Klebsiella oxytoca, K. pneumoniae, Proteus sp.,    Serratia marcescens, Haemophilus influenzae,    Neisseria meningitidis, Pseudomonas aeruginosa, Candida    albicans, C. glabrata, C krusei, C parapsilosis,    C. tropicalis and the KPC resistance gene.  Organism: Blood Culture PCR  Klebsiella pneumoniae (01-05-19 @ 11:36)  Organism: Klebsiella pneumoniae (01-05-19 @ 11:36)      -  Amikacin: S <=8      -  Ampicillin: R >16 These ampicillin results predict results for amoxicillin      -  Ampicillin/Sulbactam: R >16/8      -  Aztreonam: S <=4      -  Cefazolin: R 16      -  Cefepime: S <=2      -  Cefoxitin: S <=4      -  Ceftriaxone: S <=1 Enterobacter, Citrobacter, and Serratia may develop resistance during prolonged therapy      -  Ciprofloxacin: S <=0.5      -  Ertapenem: S <=0.5      -  Gentamicin: S <=1      -  Imipenem: S <=1      -  Levofloxacin: S <=1      -  Meropenem: S <=1      -  Piperacillin/Tazobactam: I 32      -  Tobramycin: S <=2      -  Trimethoprim/Sulfamethoxazole: S <=0.5/9.5      Method Type: KOREY  Organism: Blood Culture PCR (01-05-19 @ 11:36)      -  Klebsiella pneumoniae: Detec      Method Type: PCR        RADIOLOGY & ADDITIONAL TESTS:    Personally reviewed.     Consultant(s) Notes Reviewed:  [x] YES  [ ] NO

## 2019-01-08 NOTE — PROGRESS NOTE ADULT - SUBJECTIVE AND OBJECTIVE BOX
IRENE TAYLOR  68y  Female    Patient is a 68y old  Female who presents with a chief complaint of abd pain, vomiting (08 Jan 2019 14:28)    seen on dialysis.   [employer] works well.    PAST MEDICAL & SURGICAL HISTORY:  Cholecystitis with cholangitis  Acute urinary retention  Liver abscess  ESRD (end stage renal disease) on dialysis: MWF  CAD (coronary artery disease): s/p stent in 2007  Diabetes  Myocardial infarction  Hypertension  H/O: hysterectomy          PHYSICAL EXAM:    T(C): 36.7 (01-08-19 @ 16:07), Max: 37.1 (01-08-19 @ 08:21)  HR: 79 (01-08-19 @ 16:07) (69 - 89)  BP: 139/74 (01-08-19 @ 16:07) (116/73 - 160/78)  RR: 17 (01-08-19 @ 16:07) (17 - 18)  SpO2: 98% (01-08-19 @ 16:07) (98% - 100%)  Wt(kg): --    I&O's Detail    07 Jan 2019 07:01  -  08 Jan 2019 07:00  --------------------------------------------------------  IN:    IV PiggyBack: 200 mL    Oral Fluid: 220 mL  Total IN: 420 mL    OUT:    Voided: 200 mL  Total OUT: 200 mL    Total NET: 220 mL      08 Jan 2019 07:01  -  08 Jan 2019 16:59  --------------------------------------------------------  IN:    Other: 900 mL  Total IN: 900 mL    OUT:    Other: 2900 mL  Total OUT: 2900 mL    Total NET: -2000 mL      Respiratory: clear anteriorly, decreased BS at bases  Cardiovascular: S1 S2  Gastrointestinal: soft NT ND +BS  Extremities: trace edema   Neuro: Awake and alert    MEDICATIONS  (STANDING):  aspirin enteric coated 81 milliGRAM(s) Oral daily  atorvastatin 80 milliGRAM(s) Oral at bedtime  cefTRIAXone   IVPB 1 Gram(s) IV Intermittent every 24 hours  cholestyramine Powder (Sugar-Free) 4 Gram(s) Oral daily  dextrose 5%. 1000 milliLiter(s) (50 mL/Hr) IV Continuous <Continuous>  dextrose 50% Injectable 12.5 Gram(s) IV Push once  dextrose 50% Injectable 25 Gram(s) IV Push once  dextrose 50% Injectable 25 Gram(s) IV Push once  gabapentin 300 milliGRAM(s) Oral two times a day  heparin  Injectable 5000 Unit(s) SubCutaneous every 8 hours  insulin lispro (HumaLOG) corrective regimen sliding scale   SubCutaneous three times a day before meals  insulin lispro (HumaLOG) corrective regimen sliding scale   SubCutaneous at bedtime  isosorbide   mononitrate ER Tablet (IMDUR) 30 milliGRAM(s) Oral at bedtime  isosorbide   mononitrate ER Tablet (IMDUR) 60 milliGRAM(s) Oral daily  lactobacillus acidophilus 1 Tablet(s) Oral three times a day with meals  metoprolol tartrate 50 milliGRAM(s) Oral two times a day  metroNIDAZOLE  IVPB 500 milliGRAM(s) IV Intermittent every 8 hours  NIFEdipine XL 60 milliGRAM(s) Oral daily    MEDICATIONS  (PRN):  dextrose 40% Gel 15 Gram(s) Oral once PRN Blood Glucose LESS THAN 70 milliGRAM(s)/deciliter  glucagon  Injectable 1 milliGRAM(s) IntraMuscular once PRN Glucose LESS THAN 70 milligrams/deciliter                            10.3   12.31 )-----------( 253      ( 08 Jan 2019 06:22 )             33.7       01-08    138  |  100  |  25<H>  ----------------------------<  109<H>  3.8   |  29  |  5.60<H>    Ca    7.4<L>      08 Jan 2019 06:22  Phos  2.1     01-07  Mg     2.0     01-07    TPro  6.2  /  Alb  1.1<L>  /  TBili  0.4  /  DBili  x   /  AST  57<H>  /  ALT  33  /  AlkPhos  309<H>  01-08

## 2019-01-08 NOTE — PROGRESS NOTE ADULT - SUBJECTIVE AND OBJECTIVE BOX
[facility] -- Glynn Bullock, Claudia Davis Pannella, Patel, Savella  Office # [phone]    Follow Up:  Preop Eval    Subjective/Observations: Seen and evaluated.  Comfortable on RA.  No complaints offered at this time    REVIEW OF SYSTEMS: All other review of systems is negative unless indicated above    PAST MEDICAL & SURGICAL HISTORY:  Cholecystitis with cholangitis  Acute urinary retention  Liver abscess  ESRD (end stage renal disease) on dialysis: MWF  CAD (coronary artery disease): s/p stent in 2007  Diabetes  Myocardial infarction  Hypertension  H/O: hysterectomy    MEDICATIONS  (STANDING):  aspirin enteric coated 81 milliGRAM(s) Oral daily  atorvastatin 80 milliGRAM(s) Oral at bedtime  cefTRIAXone   IVPB 1 Gram(s) IV Intermittent every 24 hours  dextrose 5%. 1000 milliLiter(s) (50 mL/Hr) IV Continuous <Continuous>  dextrose 50% Injectable 12.5 Gram(s) IV Push once  dextrose 50% Injectable 25 Gram(s) IV Push once  dextrose 50% Injectable 25 Gram(s) IV Push once  docusate sodium 100 milliGRAM(s) Oral daily  gabapentin 300 milliGRAM(s) Oral two times a day  heparin  Injectable 5000 Unit(s) SubCutaneous every 8 hours  insulin lispro (HumaLOG) corrective regimen sliding scale   SubCutaneous three times a day before meals  insulin lispro (HumaLOG) corrective regimen sliding scale   SubCutaneous at bedtime  isosorbide   mononitrate ER Tablet (IMDUR) 30 milliGRAM(s) Oral at bedtime  isosorbide   mononitrate ER Tablet (IMDUR) 60 milliGRAM(s) Oral daily  lactobacillus acidophilus 1 Tablet(s) Oral three times a day with meals  metoprolol tartrate 50 milliGRAM(s) Oral two times a day  metroNIDAZOLE  IVPB 500 milliGRAM(s) IV Intermittent every 8 hours  NIFEdipine XL 60 milliGRAM(s) Oral daily  senna 2 Tablet(s) Oral at bedtime    MEDICATIONS  (PRN):  dextrose 40% Gel 15 Gram(s) Oral once PRN Blood Glucose LESS THAN 70 milliGRAM(s)/deciliter  glucagon  Injectable 1 milliGRAM(s) IntraMuscular once PRN Glucose LESS THAN 70 milligrams/deciliter      Allergies    ertapenem (Urticaria)  Purell (Rash)    Intolerances        Vital Signs Last 24 Hrs  T(C): 37.1 (08 Jan 2019 08:21), Max: 37.1 (08 Jan 2019 08:21)  T(F): 98.7 (08 Jan 2019 08:21), Max: 98.7 (08 Jan 2019 08:21)  HR: 75 (08 Jan 2019 08:21) (68 - 89)  BP: 122/67 (08 Jan 2019 08:21) (111/71 - 160/78)  BP(mean): --  RR: 18 (08 Jan 2019 08:21) (17 - 18)  SpO2: 100% (08 Jan 2019 08:21) (98% - 100%)    I&O's Summary    07 Jan 2019 07:01  -  08 Jan 2019 07:00  --------------------------------------------------------  IN: 420 mL / OUT: 200 mL / NET: 220 mL    PHYSICAL EXAM:  TELE: NSR  Constitutional: NAD, awake and alert, obese  HEENT: Moist Mucous Membranes, Anicteric  Pulmonary: Non-labored, breath sounds are clear bilaterally, No wheezing, rales or rhonchi  Cardiovascular: Regular, S1 and S2, No murmurs, rubs, gallops or clicks  Gastrointestinal: Bowel Sounds present, soft, nontender.   Lymph: No peripheral edema. No lymphadenopathy.  Skin: No visible rashes or ulcers.  Psych:  Mood & affect appropriate    LABS: All Labs Reviewed:                        10.3   12.31 )-----------( 253      ( 08 Jan 2019 06:22 )             33.7                         10.4   11.79 )-----------( 243      ( 07 Jan 2019 06:51 )             33.2                         10.0   11.69 )-----------( 218      ( 06 Jan 2019 08:10 )             32.6     08 Jan 2019 06:22    138    |  100    |  25     ----------------------------<  109    3.8     |  29     |  5.60   07 Jan 2019 06:51    138    |  100    |  20     ----------------------------<  152    3.6     |  31     |  4.80   06 Jan 2019 08:10    137    |  100    |  15     ----------------------------<  147    3.8     |  29     |  3.80     Ca    7.4        08 Jan 2019 06:22  Ca    7.4        07 Jan 2019 06:51  Ca    7.5        06 Jan 2019 08:10  Phos  2.1       07 Jan 2019 06:51  Phos  3.1       06 Jan 2019 08:10  Mg     2.0       07 Jan 2019 06:51  Mg     1.9       06 Jan 2019 08:10    TPro  6.2    /  Alb  1.1    /  TBili  0.4    /  DBili  x      /  AST  57     /  ALT  33     /  AlkPhos  309    08 Jan 2019 06:22  TPro  6.3    /  Alb  1.1    /  TBili  0.5    /  DBili  x      /  AST  56     /  ALT  35     /  AlkPhos  370    07 Jan 2019 06:51  TPro  6.0    /  Alb  1.1    /  TBili  0.7    /  DBili  x      /  AST  54     /  ALT  35     /  AlkPhos  409    06 Jan 2019 08:10      < from: TTE Echo Doppler w/o Cont (05.04.18 @ 20:56) >     EXAM:  ECHO TTE W/O CON COMP W/DOPPLR      PROCEDURE DATE:  05/04/2018      INTERPRETATION:  Height 165cm. weight 95kg. blood pressure 129/81.   Technician TE. Indication for study dyspnea.    Aortic root 2.3 cm left atrium 4.4 cm left ventricular end-diastolic   diameter 5.7 cm left ventricular end-systolic diameter 4.3 cm septal wall   thickness 1.2 cm posterior wall thickness 1.2 cm visually equivocally   estimated ejection fraction 50-55%.    The aortic root is calcified and of normaldiameter. 3 distinct leaflets   of the aortic valve are not well demonstrated by this study. The   technician was unable to identify any significant gradient across this   valve to suggest hemodynamically significant aortic stenosis. Left atrium   isenlarged. The left ventricle was difficult to visualize by all   standard echocardiographic windows. Possibly the end-diastolic diameter   is mildly increased. The wall thickness is borderline increased. Any   significant focal wall motion abnormality cannot be ascertained by this   study. Visually estimated ejection fraction 50-55%. The mitral annulus is   calcified. The mitral valve leaflets are mildly thickened with a normal   EF slope and excursion. There is no evidence for hemodynamically   significant mitral stenosis. On the very limited color flow Doppler   portion examination mild mitral regurgitation suggested.    Conclusion:  1. Technically difficult limited supine study. Please note that this is a   portable study and the study is also limited due to patient's body   habitus.  2. 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  3. Enlarged left atrium with associated mild mitral regurgitation.  4. Very limited visualization left ventricle which may represent mild   left ventricular concentric hypertrophy with a well-preserved ejection   fraction as described above.  5. A very small posterior pericardial effusion is suggested but not   diagnostic.  6. Correlate these limited findings clinically.    SALVADOR PHILLIPS M.D., ATTENDING CARDIOLOGIST  This document has been electronically signed. May  5 2018  9:47AM      < end of copied text >    < from: CT Chest w/ IV Cont (01.02.19 @ 15:04) >    EXAM:  CT ABDOMEN AND PELVIS IC                          EXAM:  CT CHEST IC                          PROCEDURE DATE:  01/02/2019      INTERPRETATION:  .    CLINICAL INFORMATION: Lower chest pain.    TECHNIQUE: Helical axial images were obtained from the thoracic inlet to   the pubic symphysis. 95 mls of Omnipaque-350 was administered   intravenously without complication and 5 mls were discarded. Oral   contrast was not administered. Sagittal and coronal reconstructed images   were obtained from the source data.    COMPARISON: Most recent prior CT examination of the abdomen and pelvis   from 11/26/2018. Prior MRI examination of the abdomen from 11/27/2018.   Prior chest, abdomen, and pelvis CT examination from 10/29/2018.    FINDINGS: Scattered subcentimeter sized lymph nodes are noted within the   axillary regions, mediastinum, and hilar areas.    The heart size is enlarged. The pericardium appears unremarkable. There   is a right sided dialysis catheter with its tip at the right atrium. A   left-sided IJ central line is noted with the tip at the SVC. No filling   defects are notable within the pulmonary arterial vessels. There are   atherosclerotic calcifications of the imaged coronary arteries, aorta,   and branch vessels. The imaged portions of the aorta are normal in   caliber.    The central airways are patent. Patchy groundglass opacities are notable   throughout both lungs with resultant mosaic attenuation. Scattered areas   of linear atelectasis are notable within the bilateral lung bases, left   upper lobe, and lingula.    There is redemonstration of nonspecific gallbladder wall thickening   and/or edema and/or fat deposition. Sludge and/or stones are noted within   the lumen of the gallbladder. A cholecystostomy tube tract is notable   along the right upper anterior abdominal wall. A small amount of   pneumobilia is noted, compatible with stent patency. The common bile duct   remains dilated and measures up to 1.2 cm. A common bile duct stent is   again noted. No radiopaque stones are notable adjacent to the stent.   Previously noted abscesses within the liver appear less conspicuous   compared to the prior MRI and CT examinations. A small low-attenuation   focus is noted within the central hepatic area measuring up to 8 mm   (series 2, image 114).     The pancreas, spleen, and adrenal glands appear unremarkable.    There is atrophy of the bilateral kidneys. A left-sided renal cyst   appears unchanged. There is minimal nonspecific bilateral perinephric   thickening. There is no hydronephrosis bilaterally. Arterial vascular   calcifications are noted in the vicinity of the bilateral renal   collecting systems. The urinary bladder appears unremarkable.    There are multiple scattered nonspecificsubcentimeter retroperitoneal   and mesenteric lymph nodes.    There is no bowel obstruction or abdominal ascites. A duodenal   diverticulum appears unchanged. Gas and stool are notable throughout the   large bowel loops. The appendix is normal.    The patient is status post hysterectomy. No adnexal masses are noted.    Multilevel degenerative changes notable within the imaged spine. There is   a moderate compression deformity of the L2 vertebral body which appears   unchanged. There is also a mild superior endplate deformity of the T12   vertebral body which also appears unchanged.    Areas of mixed lucency and sclerosis adjacent to the bilateral SI joints   appear unchanged. Mild diffuse osteosclerosis may be compatible with   early renal osteodystrophy.    IMPRESSION:    CT chest:  1. Groundglass mosaic attenuation to the lungs which may reflect small   airways or small vessel disease.    CT abdomen and pelvis:  1. Chronic cholecystitis, as seen on prior exams. A superimposed acute   component cannot be completely excluded.  2. Redemonstration of a common bile duct stent with pneumobilia,   compatible with stent patency.  3. Interval decreased conspicuity of previous noted abscesses throughout   the liver with a small abscess remaining, as discussed.  4. Other chronic nonemergent findings, as discussed.    CARLITO BEE M.D., ATTENDING RADIOLOGIST  This document has been electronically signed. Jan 2 2019  3:30PM      < end of copied text > [facility] -- Glynn Bullock, Claudia Davis Pannella, Patel, Savella  Office # [phone]    Follow Up:  Preop Eval, CAD    Subjective/Observations: Patient seen and examined. Events noted. Resting comfortably in bed. No complaints of chest pain, dyspnea, or palpitations reported. No signs of orthopnea or PND. C/o diarrhea    REVIEW OF SYSTEMS: All other review of systems is negative unless indicated above    PAST MEDICAL & SURGICAL HISTORY:  Cholecystitis with cholangitis  Acute urinary retention  Liver abscess  ESRD (end stage renal disease) on dialysis: F  CAD (coronary artery disease): s/p stent in 2007  Diabetes  Myocardial infarction  Hypertension  H/O: hysterectomy    MEDICATIONS  (STANDING):  aspirin enteric coated 81 milliGRAM(s) Oral daily  atorvastatin 80 milliGRAM(s) Oral at bedtime  cefTRIAXone   IVPB 1 Gram(s) IV Intermittent every 24 hours  dextrose 5%. 1000 milliLiter(s) (50 mL/Hr) IV Continuous <Continuous>  dextrose 50% Injectable 12.5 Gram(s) IV Push once  dextrose 50% Injectable 25 Gram(s) IV Push once  dextrose 50% Injectable 25 Gram(s) IV Push once  docusate sodium 100 milliGRAM(s) Oral daily  gabapentin 300 milliGRAM(s) Oral two times a day  heparin  Injectable 5000 Unit(s) SubCutaneous every 8 hours  insulin lispro (HumaLOG) corrective regimen sliding scale   SubCutaneous three times a day before meals  insulin lispro (HumaLOG) corrective regimen sliding scale   SubCutaneous at bedtime  isosorbide   mononitrate ER Tablet (IMDUR) 30 milliGRAM(s) Oral at bedtime  isosorbide   mononitrate ER Tablet (IMDUR) 60 milliGRAM(s) Oral daily  lactobacillus acidophilus 1 Tablet(s) Oral three times a day with meals  metoprolol tartrate 50 milliGRAM(s) Oral two times a day  metroNIDAZOLE  IVPB 500 milliGRAM(s) IV Intermittent every 8 hours  NIFEdipine XL 60 milliGRAM(s) Oral daily  senna 2 Tablet(s) Oral at bedtime    MEDICATIONS  (PRN):  dextrose 40% Gel 15 Gram(s) Oral once PRN Blood Glucose LESS THAN 70 milliGRAM(s)/deciliter  glucagon  Injectable 1 milliGRAM(s) IntraMuscular once PRN Glucose LESS THAN 70 milligrams/deciliter      Allergies    ertapenem (Urticaria)  Purell (Rash)    Intolerances        Vital Signs Last 24 Hrs  T(C): 37.1 (08 Jan 2019 08:21), Max: 37.1 (08 Jan 2019 08:21)  T(F): 98.7 (08 Jan 2019 08:21), Max: 98.7 (08 Jan 2019 08:21)  HR: 75 (08 Jan 2019 08:21) (68 - 89)  BP: 122/67 (08 Jan 2019 08:21) (111/71 - 160/78)  BP(mean): --  RR: 18 (08 Jan 2019 08:21) (17 - 18)  SpO2: 100% (08 Jan 2019 08:21) (98% - 100%)    I&O's Summary    07 Jan 2019 07:01  -  08 Jan 2019 07:00  --------------------------------------------------------  IN: 420 mL / OUT: 200 mL / NET: 220 mL    PHYSICAL EXAM:  TELE: NSR  Constitutional: NAD, awake and alert, obese  HEENT: Moist Mucous Membranes, Anicteric  Pulmonary: Non-labored, breath sounds are clear bilaterally, No wheezing, rales or rhonchi  Cardiovascular: Regular, S1 and S2, No murmurs, rubs, gallops or clicks  Gastrointestinal: Bowel Sounds present, soft, nontender.   Lymph: No peripheral edema. No lymphadenopathy.  Skin: No visible rashes or ulcers.  Psych:  Mood & affect appropriate    LABS: All Labs Reviewed:                        10.3   12.31 )-----------( 253      ( 08 Jan 2019 06:22 )             33.7                         10.4   11.79 )-----------( 243      ( 07 Jan 2019 06:51 )             33.2                         10.0   11.69 )-----------( 218      ( 06 Jan 2019 08:10 )             32.6     08 Jan 2019 06:22    138    |  100    |  25     ----------------------------<  109    3.8     |  29     |  5.60   07 Jan 2019 06:51    138    |  100    |  20     ----------------------------<  152    3.6     |  31     |  4.80   06 Jan 2019 08:10    137    |  100    |  15     ----------------------------<  147    3.8     |  29     |  3.80     Ca    7.4        08 Jan 2019 06:22  Ca    7.4        07 Jan 2019 06:51  Ca    7.5        06 Jan 2019 08:10  Phos  2.1       07 Jan 2019 06:51  Phos  3.1       06 Jan 2019 08:10  Mg     2.0       07 Jan 2019 06:51  Mg     1.9       06 Jan 2019 08:10    TPro  6.2    /  Alb  1.1    /  TBili  0.4    /  DBili  x      /  AST  57     /  ALT  33     /  AlkPhos  309    08 Jan 2019 06:22  TPro  6.3    /  Alb  1.1    /  TBili  0.5    /  DBili  x      /  AST  56     /  ALT  35     /  AlkPhos  370    07 Jan 2019 06:51  TPro  6.0    /  Alb  1.1    /  TBili  0.7    /  DBili  x      /  AST  54     /  ALT  35     /  AlkPhos  409    06 Jan 2019 08:10      < from: TTE Echo Doppler w/o Cont (05.04.18 @ 20:56) >     EXAM:  ECHO TTE W/O CON COMP W/DOPPLR      PROCEDURE DATE:  05/04/2018      INTERPRETATION:  Height 165cm. weight 95kg. blood pressure 129/81.   Technician TE. Indication for study dyspnea.    Aortic root 2.3 cm left atrium 4.4 cm left ventricular end-diastolic   diameter 5.7 cm left ventricular end-systolic diameter 4.3 cm septal wall   thickness 1.2 cm posterior wall thickness 1.2 cm visually equivocally   estimated ejection fraction 50-55%.    The aortic root is calcified and of normaldiameter. 3 distinct leaflets   of the aortic valve are not well demonstrated by this study. The   technician was unable to identify any significant gradient across this   valve to suggest hemodynamically significant aortic stenosis. Left atrium   isenlarged. The left ventricle was difficult to visualize by all   standard echocardiographic windows. Possibly the end-diastolic diameter   is mildly increased. The wall thickness is borderline increased. Any   significant focal wall motion abnormality cannot be ascertained by this   study. Visually estimated ejection fraction 50-55%. The mitral annulus is   calcified. The mitral valve leaflets are mildly thickened with a normal   EF slope and excursion. There is no evidence for hemodynamically   significant mitral stenosis. On the very limited color flow Doppler   portion examination mild mitral regurgitation suggested.    Conclusion:  1. Technically difficult limited supine study. Please note that this is a   portable study and the study is also limited due to patient's body   habitus.  2. 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  3. Enlarged left atrium with associated mild mitral regurgitation.  4. Very limited visualization left ventricle which may represent mild   left ventricular concentric hypertrophy with a well-preserved ejection   fraction as described above.  5. A very small posterior pericardial effusion is suggested but not   diagnostic.  6. Correlate these limited findings clinically.    SALVADOR PHILLIPS M.D., ATTENDING CARDIOLOGIST  This document has been electronically signed. May  5 2018  9:47AM      < end of copied text >    < from: CT Chest w/ IV Cont (01.02.19 @ 15:04) >    EXAM:  CT ABDOMEN AND PELVIS IC                          EXAM:  CT CHEST IC                          PROCEDURE DATE:  01/02/2019      INTERPRETATION:  .    CLINICAL INFORMATION: Lower chest pain.    TECHNIQUE: Helical axial images were obtained from the thoracic inlet to   the pubic symphysis. 95 mls of Omnipaque-350 was administered   intravenously without complication and 5 mls were discarded. Oral   contrast was not administered. Sagittal and coronal reconstructed images   were obtained from the source data.    COMPARISON: Most recent prior CT examination of the abdomen and pelvis   from 11/26/2018. Prior MRI examination of the abdomen from 11/27/2018.   Prior chest, abdomen, and pelvis CT examination from 10/29/2018.    FINDINGS: Scattered subcentimeter sized lymph nodes are noted within the   axillary regions, mediastinum, and hilar areas.    The heart size is enlarged. The pericardium appears unremarkable. There   is a right sided dialysis catheter with its tip at the right atrium. A   left-sided IJ central line is noted with the tip at the SVC. No filling   defects are notable within the pulmonary arterial vessels. There are   atherosclerotic calcifications of the imaged coronary arteries, aorta,   and branch vessels. The imaged portions of the aorta are normal in   caliber.    The central airways are patent. Patchy groundglass opacities are notable   throughout both lungs with resultant mosaic attenuation. Scattered areas   of linear atelectasis are notable within the bilateral lung bases, left   upper lobe, and lingula.    There is redemonstration of nonspecific gallbladder wall thickening   and/or edema and/or fat deposition. Sludge and/or stones are noted within   the lumen of the gallbladder. A cholecystostomy tube tract is notable   along the right upper anterior abdominal wall. A small amount of   pneumobilia is noted, compatible with stent patency. The common bile duct   remains dilated and measures up to 1.2 cm. A common bile duct stent is   again noted. No radiopaque stones are notable adjacent to the stent.   Previously noted abscesses within the liver appear less conspicuous   compared to the prior MRI and CT examinations. A small low-attenuation   focus is noted within the central hepatic area measuring up to 8 mm   (series 2, image 114).     The pancreas, spleen, and adrenal glands appear unremarkable.    There is atrophy of the bilateral kidneys. A left-sided renal cyst   appears unchanged. There is minimal nonspecific bilateral perinephric   thickening. There is no hydronephrosis bilaterally. Arterial vascular   calcifications are noted in the vicinity of the bilateral renal   collecting systems. The urinary bladder appears unremarkable.    There are multiple scattered nonspecificsubcentimeter retroperitoneal   and mesenteric lymph nodes.    There is no bowel obstruction or abdominal ascites. A duodenal   diverticulum appears unchanged. Gas and stool are notable throughout the   large bowel loops. The appendix is normal.    The patient is status post hysterectomy. No adnexal masses are noted.    Multilevel degenerative changes notable within the imaged spine. There is   a moderate compression deformity of the L2 vertebral body which appears   unchanged. There is also a mild superior endplate deformity of the T12   vertebral body which also appears unchanged.    Areas of mixed lucency and sclerosis adjacent to the bilateral SI joints   appear unchanged. Mild diffuse osteosclerosis may be compatible with   early renal osteodystrophy.    IMPRESSION:    CT chest:  1. Groundglass mosaic attenuation to the lungs which may reflect small   airways or small vessel disease.    CT abdomen and pelvis:  1. Chronic cholecystitis, as seen on prior exams. A superimposed acute   component cannot be completely excluded.  2. Redemonstration of a common bile duct stent with pneumobilia,   compatible with stent patency.  3. Interval decreased conspicuity of previous noted abscesses throughout   the liver with a small abscess remaining, as discussed.  4. Other chronic nonemergent findings, as discussed.    CARLITO BEE M.D., ATTENDING RADIOLOGIST  This document has been electronically signed. Jan 2 2019  3:30PM      < end of copied text >

## 2019-01-08 NOTE — PROGRESS NOTE ADULT - PROBLEM SELECTOR PLAN 1
Pt met sepsis criteria (fever, leukocytosis, source) on admission, suspect 2/2 acute on chronic cholecystitis   - CT Chest/Abd/Pel showed possible acute kevin on chronic kevin, decreased appearance of liver abscess - small abscess remains.  - Continue pain regimen and Tylenol PRN for fever.  - Leukocytosis was trending down, WBC count slightly up today.  - BCx positive for Klebsiella -- repeat cultures negative so far.  - On IV Zosyn cultures cleared, but sensitivities came back as intermediate to zosyn so continue IV Rocephin and Flagyl (started 1/6) per ID (the flagyl given since pt had liver abscess and in those situations broader coverage recommended).

## 2019-01-08 NOTE — PROGRESS NOTE ADULT - SUBJECTIVE AND OBJECTIVE BOX
INTERVAL HPI/OVERNIGHT EVENTS:  pt seen and examined   denies n/v/abd pain, c/o diarrhea  per overnight rn pt passed large brown bm and then has had loose stools since  afebrile overnight labs noted    MEDICATIONS  (STANDING):  aspirin enteric coated 81 milliGRAM(s) Oral daily  atorvastatin 80 milliGRAM(s) Oral at bedtime  cefTRIAXone   IVPB 1 Gram(s) IV Intermittent every 24 hours  dextrose 5%. 1000 milliLiter(s) (50 mL/Hr) IV Continuous <Continuous>  dextrose 50% Injectable 12.5 Gram(s) IV Push once  dextrose 50% Injectable 25 Gram(s) IV Push once  dextrose 50% Injectable 25 Gram(s) IV Push once  docusate sodium 100 milliGRAM(s) Oral daily  gabapentin 300 milliGRAM(s) Oral two times a day  heparin  Injectable 5000 Unit(s) SubCutaneous every 8 hours  insulin lispro (HumaLOG) corrective regimen sliding scale   SubCutaneous three times a day before meals  insulin lispro (HumaLOG) corrective regimen sliding scale   SubCutaneous at bedtime  isosorbide   mononitrate ER Tablet (IMDUR) 30 milliGRAM(s) Oral at bedtime  isosorbide   mononitrate ER Tablet (IMDUR) 60 milliGRAM(s) Oral daily  lactobacillus acidophilus 1 Tablet(s) Oral three times a day with meals  metoprolol tartrate 50 milliGRAM(s) Oral two times a day  metroNIDAZOLE  IVPB 500 milliGRAM(s) IV Intermittent every 8 hours  NIFEdipine XL 60 milliGRAM(s) Oral daily  senna 2 Tablet(s) Oral at bedtime    MEDICATIONS  (PRN):  dextrose 40% Gel 15 Gram(s) Oral once PRN Blood Glucose LESS THAN 70 milliGRAM(s)/deciliter  glucagon  Injectable 1 milliGRAM(s) IntraMuscular once PRN Glucose LESS THAN 70 milligrams/deciliter      Allergies    ertapenem (Urticaria)  Purell (Rash)    Intolerances        Review of Systems:    General:  No wt loss, fevers, chills, night sweats, fatigue   Eyes:  Good vision, no reported pain  ENT:  No sore throat, pain, runny nose, dysphagia  CV:  No pain, palpitations, hypo/hypertension  Resp:  No dyspnea, cough, tachypnea, wheezing  GI:  No pain, No nausea, No vomiting, +diarrhea, No constipation, No weight loss, No fever, No pruritis, No rectal bleeding, No melena, No dysphagia  :  No pain, bleeding, incontinence, nocturia  Muscle:  No pain, weakness  Neuro:  No weakness, tingling, memory problems  Psych:  No fatigue, insomnia, mood problems, depression  Endocrine:  No polyuria, polydypsia, cold/heat intolerance  Heme:  No petechiae, ecchymosis, easy bruisability  Skin:  No rash, tattoos, scars, edema      Vital Signs Last 24 Hrs  T(C): 37.1 (08 Jan 2019 08:21), Max: 37.1 (08 Jan 2019 08:21)  T(F): 98.7 (08 Jan 2019 08:21), Max: 98.7 (08 Jan 2019 08:21)  HR: 75 (08 Jan 2019 08:21) (68 - 89)  BP: 122/67 (08 Jan 2019 08:21) (111/71 - 160/78)  BP(mean): --  RR: 18 (08 Jan 2019 08:21) (17 - 18)  SpO2: 100% (08 Jan 2019 08:21) (98% - 100%)    PHYSICAL EXAM:    Constitutional: NAD  HEENT: ncat  Neck: No LAD  Respiratory: dec bs  Cardiovascular: S1 and S2, RRR  Gastrointestinal: obese soft nt nd  Extremities: trace edema  Vascular: 2+ peripheral pulses  Neurological: awake alert  Skin: No rashes    LABS:                        10.3   12.31 )-----------( 253      ( 08 Jan 2019 06:22 )             33.7     01-08    138  |  100  |  25<H>  ----------------------------<  109<H>  3.8   |  29  |  5.60<H>    Ca    7.4<L>      08 Jan 2019 06:22  Phos  2.1     01-07  Mg     2.0     01-07    TPro  6.2  /  Alb  1.1<L>  /  TBili  0.4  /  DBili  x   /  AST  57<H>  /  ALT  33  /  AlkPhos  309<H>  01-08          RADIOLOGY & ADDITIONAL TESTS:

## 2019-01-08 NOTE — PROGRESS NOTE ADULT - PROBLEM SELECTOR PLAN 2
Suspect acute on chronic per CT and symptoms on admission --- HIDA r/out acute kevin at the time of the scan so perhaps pt had obstruction that cleared by time of HIDA  - Continue IV Rocephin and Flagyl as per ID.  - Per Gen Surg (Dr. Campos), pt will benefit from lap kevin but when better surgically optimized. Awaiting recs from Dr. Smith.  - Per GI (Dr. Camacho), CBD stent still in place and will need to be removed after lap kevin.

## 2019-01-08 NOTE — PROGRESS NOTE ADULT - SUBJECTIVE AND OBJECTIVE BOX
[facility], Division of Infectious Diseases  DEB Barbosa A. Lee  [phone]    Name: IRENE TAYLOR  Age: 68y  Gender: Female  MRN: [id number]    Interval History--  Notes reviewed. Seen at dialysis with Dr. Campos.  Developed diarrhea, C. difficile and norovirus being ruled out.     Past Medical History--  Cholecystitis with cholangitis  Acute urinary retention  Liver abscess  ESRD (end stage renal disease) on dialysis  CAD (coronary artery disease)  Diabetes  CRF (chronic renal failure)  Hypertension  Pneumonia  Myocardial infarction  Hypertension  Diabetes  H/O: hysterectomy      For details regarding the patient's social history, family history, and other miscellaneous elements, please refer the initial infectious diseases consultation and/or the admitting history and physical examination for this admission.    Allergies    ertapenem (Urticaria)  Purell (Rash)    Intolerances        Medications--  Antibiotics:  cefTRIAXone   IVPB 1 Gram(s) IV Intermittent every 24 hours  metroNIDAZOLE  IVPB 500 milliGRAM(s) IV Intermittent every 8 hours    Immunologic:    Other:  aspirin enteric coated  atorvastatin  cholestyramine Powder (Sugar-Free)  dextrose 40% Gel PRN  dextrose 5%.  dextrose 50% Injectable  dextrose 50% Injectable  dextrose 50% Injectable  gabapentin  glucagon  Injectable PRN  heparin  Injectable  insulin lispro (HumaLOG) corrective regimen sliding scale  insulin lispro (HumaLOG) corrective regimen sliding scale  isosorbide   mononitrate ER Tablet (IMDUR)  isosorbide   mononitrate ER Tablet (IMDUR)  lactobacillus acidophilus  metoprolol tartrate  NIFEdipine XL      Review of Systems--  ROS unable. No language line in dialysis.     Physical Examination--  Vital Signs: T(F): 98.2 (01-08-19 @ 11:10), Max: 98.7 (01-08-19 @ 08:21)  HR: 81 (01-08-19 @ 11:10)  BP: 158/91 (01-08-19 @ 11:10)  RR: 18 (01-08-19 @ 11:10)  SpO2: 100% (01-08-19 @ 11:10)  Wt(kg): --  HEENT: AT/NC. Anicteric. Conjunctiva pink and moist. Oropharynx clear.  Neck: Not rigid. No sense of mass.  Nodes: None palpable.  Chest: HD cath C/D/I R ACW. CVL LSCV position C/D/I.  Lungs: Diminished breath sounds bilaterally without rales, wheezing or rhonchi  Heart: Regular rate and rhythm. No Murmur. No rub. No gallop. No palpable thrill.  Abdomen: Bowel sounds present and normoactive. Soft. Nondistended. No tenderness whatsoever today. No sense of mass. No organomegaly.  Extremities: No cyanosis or clubbing. 1+ edema.   Skin: Warm. Dry. Good turgor. No vasculitic stigmata.  Psychiatric: Appropriate affect and mood for situation.       Laboratory Studies--  CBC                        10.3   12.31 )-----------( 253      ( 08 Jan 2019 06:22 )             33.7     WBC Count: 11.79 K/uL (01-07-19 @ 06:51)  WBC Count: 11.69 K/uL (01-06-19 @ 08:10)  WBC Count: 14.66 K/uL (01-05-19 @ 06:17)  WBC Count: 12.71 K/uL (01-04-19 @ 06:43)  WBC Count: 14.34 K/uL (01-03-19 @ 05:43)  WBC Count: 17.23 K/uL (01-02-19 @ 13:33)      Chemistries  01-08    138  |  100  |  25<H>  ----------------------------<  109<H>  3.8   |  29  |  5.60<H>    Ca    7.4<L>      08 Jan 2019 06:22  Phos  2.1     01-07  Mg     2.0     01-07    TPro  6.2  /  Alb  1.1<L>  /  TBili  0.4  /  DBili  x   /  AST  57<H>  /  ALT  33  /  AlkPhos  309<H>  01-08      Culture Data    Culture - Blood (collected 04 Jan 2019 07:48)  Source: .Blood Blood-Venous  Preliminary Report (05 Jan 2019 08:01):    No growth to date.    Culture - Blood (collected 04 Jan 2019 07:48)  Source: .Blood Blood-Peripheral  Preliminary Report (05 Jan 2019 08:01):    No growth to date.    Culture - Blood (collected 03 Jan 2019 18:25)  Source: .Blood Blood-Peripheral  Preliminary Report (04 Jan 2019 19:01):    No growth to date.    Culture - Blood (collected 03 Jan 2019 18:25)  Source: .Blood Blood-Peripheral  Preliminary Report (04 Jan 2019 19:01):    No growth to date.    Culture - Blood (collected 02 Jan 2019 18:33)  Source: .Blood Blood-Peripheral  Gram Stain (07 Jan 2019 11:46):    Growth in aerobic bottle: Gram Negative Rods    Growth in anaerobic bottle: Gram Negative Rods  Final Report (08 Jan 2019 10:16):    Growth in aerobic and anaerobic bottles: Klebsiella pneumoniae    See previous culture [id number]    Culture - Blood (collected 02 Jan 2019 18:32)  Source: .Blood Blood-Peripheral  Gram Stain (03 Jan 2019 15:40):    Growth in aerobic and anaerobic bottles: Gram Negative Rods  Final Report (05 Jan 2019 11:36):    Growth in aerobic and anaerobic bottles: Klebsiella pneumoniae    "Due to technical problems, Proteus sp. will Not be reported as part of    the BCID panel until further notice"    ***Blood Panel PCR results on this specimen are available    approximately 3 hours after the Gram stain result.***    Gram stain, PCR, and/or culture results may not always    correspond due to difference in methodologies.    ************************************************************    This PCR assay was performed using [employer].    The following targets are tested for: Enterococcus,    vancomycin resistant enterococci, Listeria monocytogenes,    coagulase negative staphylococci, S. aureus,    methicillin resistant S. aureus, Streptococcus agalactiae    (Group B), S. pneumoniae, S. pyogenes (Group A),    Acinetobacter baumannii, Enterobacter cloacae, E. coli,    Klebsiella oxytoca, K. pneumoniae, Proteus sp.,    Serratia marcescens, Haemophilus influenzae,    Neisseria meningitidis, Pseudomonas aeruginosa, Candida    albicans, C. glabrata, C krusei, C parapsilosis,    C. tropicalis and the KPC resistance gene.  Organism: Blood Culture PCR  Klebsiella pneumoniae (05 Jan 2019 11:36)  Organism: Klebsiella pneumoniae (05 Jan 2019 11:36)  Organism: Blood Culture PCR (05 Jan 2019 11:36)

## 2019-01-09 LAB
ANION GAP SERPL CALC-SCNC: 7 MMOL/L — SIGNIFICANT CHANGE UP (ref 5–17)
BUN SERPL-MCNC: 10 MG/DL — SIGNIFICANT CHANGE UP (ref 7–23)
C DIFF BY PCR RESULT: SIGNIFICANT CHANGE UP
C DIFF TOX GENS STL QL NAA+PROBE: SIGNIFICANT CHANGE UP
CALCIUM SERPL-MCNC: 7.2 MG/DL — LOW (ref 8.5–10.1)
CHLORIDE SERPL-SCNC: 102 MMOL/L — SIGNIFICANT CHANGE UP (ref 96–108)
CO2 SERPL-SCNC: 30 MMOL/L — SIGNIFICANT CHANGE UP (ref 22–31)
CREAT SERPL-MCNC: 3.4 MG/DL — HIGH (ref 0.5–1.3)
CULTURE RESULTS: SIGNIFICANT CHANGE UP
CULTURE RESULTS: SIGNIFICANT CHANGE UP
GLUCOSE SERPL-MCNC: 175 MG/DL — HIGH (ref 70–99)
HCT VFR BLD CALC: 31.9 % — LOW (ref 34.5–45)
HGB BLD-MCNC: 9.9 G/DL — LOW (ref 11.5–15.5)
MCHC RBC-ENTMCNC: 29.4 PG — SIGNIFICANT CHANGE UP (ref 27–34)
MCHC RBC-ENTMCNC: 31 GM/DL — LOW (ref 32–36)
MCV RBC AUTO: 94.7 FL — SIGNIFICANT CHANGE UP (ref 80–100)
NRBC # BLD: 0 /100 WBCS — SIGNIFICANT CHANGE UP (ref 0–0)
PLATELET # BLD AUTO: 283 K/UL — SIGNIFICANT CHANGE UP (ref 150–400)
POTASSIUM SERPL-MCNC: 3.5 MMOL/L — SIGNIFICANT CHANGE UP (ref 3.5–5.3)
POTASSIUM SERPL-SCNC: 3.5 MMOL/L — SIGNIFICANT CHANGE UP (ref 3.5–5.3)
RBC # BLD: 3.37 M/UL — LOW (ref 3.8–5.2)
RBC # FLD: 20.6 % — HIGH (ref 10.3–14.5)
SODIUM SERPL-SCNC: 139 MMOL/L — SIGNIFICANT CHANGE UP (ref 135–145)
SPECIMEN SOURCE: SIGNIFICANT CHANGE UP
SPECIMEN SOURCE: SIGNIFICANT CHANGE UP
WBC # BLD: 12.14 K/UL — HIGH (ref 3.8–10.5)
WBC # FLD AUTO: 12.14 K/UL — HIGH (ref 3.8–10.5)

## 2019-01-09 PROCEDURE — 99232 SBSQ HOSP IP/OBS MODERATE 35: CPT | Mod: GC

## 2019-01-09 PROCEDURE — 99232 SBSQ HOSP IP/OBS MODERATE 35: CPT

## 2019-01-09 RX ORDER — CHOLESTYRAMINE 4 G/9G
4 POWDER, FOR SUSPENSION ORAL
Qty: 0 | Refills: 0 | Status: DISCONTINUED | OUTPATIENT
Start: 2019-01-09 | End: 2019-01-14

## 2019-01-09 RX ORDER — LIDOCAINE 4 G/100G
1 CREAM TOPICAL DAILY
Qty: 0 | Refills: 0 | Status: DISCONTINUED | OUTPATIENT
Start: 2019-01-09 | End: 2019-01-14

## 2019-01-09 RX ADMIN — Medication 1: at 08:43

## 2019-01-09 RX ADMIN — CHOLESTYRAMINE 4 GRAM(S): 4 POWDER, FOR SUSPENSION ORAL at 21:18

## 2019-01-09 RX ADMIN — Medication 100 MILLIGRAM(S): at 14:18

## 2019-01-09 RX ADMIN — LIDOCAINE 1 PATCH: 4 CREAM TOPICAL at 10:08

## 2019-01-09 RX ADMIN — Medication 1 TABLET(S): at 17:44

## 2019-01-09 RX ADMIN — LIDOCAINE 1 PATCH: 4 CREAM TOPICAL at 21:20

## 2019-01-09 RX ADMIN — GABAPENTIN 300 MILLIGRAM(S): 400 CAPSULE ORAL at 05:35

## 2019-01-09 RX ADMIN — Medication 1: at 17:45

## 2019-01-09 RX ADMIN — LIDOCAINE 1 PATCH: 4 CREAM TOPICAL at 21:51

## 2019-01-09 RX ADMIN — HEPARIN SODIUM 5000 UNIT(S): 5000 INJECTION INTRAVENOUS; SUBCUTANEOUS at 14:18

## 2019-01-09 RX ADMIN — Medication 100 MILLIGRAM(S): at 05:35

## 2019-01-09 RX ADMIN — CEFTRIAXONE 100 GRAM(S): 500 INJECTION, POWDER, FOR SOLUTION INTRAMUSCULAR; INTRAVENOUS at 17:44

## 2019-01-09 RX ADMIN — HEPARIN SODIUM 5000 UNIT(S): 5000 INJECTION INTRAVENOUS; SUBCUTANEOUS at 21:19

## 2019-01-09 RX ADMIN — Medication 100 MILLIGRAM(S): at 21:20

## 2019-01-09 RX ADMIN — CHOLESTYRAMINE 4 GRAM(S): 4 POWDER, FOR SUSPENSION ORAL at 08:44

## 2019-01-09 RX ADMIN — ATORVASTATIN CALCIUM 80 MILLIGRAM(S): 80 TABLET, FILM COATED ORAL at 21:20

## 2019-01-09 RX ADMIN — Medication 60 MILLIGRAM(S): at 05:35

## 2019-01-09 RX ADMIN — Medication 81 MILLIGRAM(S): at 12:39

## 2019-01-09 RX ADMIN — Medication 1 TABLET(S): at 12:39

## 2019-01-09 RX ADMIN — HEPARIN SODIUM 5000 UNIT(S): 5000 INJECTION INTRAVENOUS; SUBCUTANEOUS at 05:35

## 2019-01-09 RX ADMIN — ISOSORBIDE MONONITRATE 60 MILLIGRAM(S): 60 TABLET, EXTENDED RELEASE ORAL at 12:39

## 2019-01-09 RX ADMIN — ISOSORBIDE MONONITRATE 30 MILLIGRAM(S): 60 TABLET, EXTENDED RELEASE ORAL at 21:20

## 2019-01-09 RX ADMIN — Medication 50 MILLIGRAM(S): at 17:44

## 2019-01-09 RX ADMIN — Medication 1 TABLET(S): at 08:43

## 2019-01-09 RX ADMIN — GABAPENTIN 300 MILLIGRAM(S): 400 CAPSULE ORAL at 17:44

## 2019-01-09 RX ADMIN — Medication 50 MILLIGRAM(S): at 05:35

## 2019-01-09 RX ADMIN — Medication 1: at 12:39

## 2019-01-09 NOTE — PROGRESS NOTE ADULT - PROBLEM SELECTOR PLAN 5
s/p stent 2007  - Continue home dose atorvastatin, metoprolol (with hold parameters) and aspirin.  - Holding Plavix for possible lap kevin. s/p stent 2007  - Continue home dose atorvastatin, metoprolol (with hold parameters) and aspirin.  - had been holding Plavix for possible lap kevin -- check with cardio if need to be on DAPT given how old stent was. If cardio wishes to restart, will restart.

## 2019-01-09 NOTE — PROGRESS NOTE ADULT - SUBJECTIVE AND OBJECTIVE BOX
pt seen  c/o R shoulder pain  diarrhea yesterday x10. 1 episode today  denies abdominal pain  ICU Vital Signs Last 24 Hrs  T(C): 36.6 (09 Jan 2019 08:00), Max: 37.4 (08 Jan 2019 20:22)  T(F): 97.8 (09 Jan 2019 08:00), Max: 99.3 (08 Jan 2019 20:22)  HR: 84 (09 Jan 2019 08:00) (78 - 84)  BP: 160/91 (09 Jan 2019 08:00) (124/70 - 160/91)  BP(mean): --  ABP: --  ABP(mean): --  RR: 18 (09 Jan 2019 08:00) (17 - 18)  SpO2: 97% (09 Jan 2019 08:00) (97% - 100%)  gen-NAD  resp-clear  abd-soft NT/ND                            9.9    12.14 )-----------( 283      ( 09 Jan 2019 06:31 )             31.9   CDif neg

## 2019-01-09 NOTE — PROGRESS NOTE ADULT - SUBJECTIVE AND OBJECTIVE BOX
[facility] -- Glynn Bullock, Claudia Davis Pannella, Patel, Savella  Office # [phone]      Follow Up:    preop eval/cad  Subjective/Observations:   No events overnight resting comfortably in bed.  No complaints of chest pain, dyspnea, or palpitations reported. No signs of orthopnea or PND.     REVIEW OF SYSTEMS: All other review of systems is negative unless indicated above    PAST MEDICAL & SURGICAL HISTORY:  Cholecystitis with cholangitis  Acute urinary retention  Liver abscess  ESRD (end stage renal disease) on dialysis: MWF  CAD (coronary artery disease): s/p stent in 2007  Diabetes  Myocardial infarction  Hypertension  H/O: hysterectomy      MEDICATIONS  (STANDING):  aspirin enteric coated 81 milliGRAM(s) Oral daily  atorvastatin 80 milliGRAM(s) Oral at bedtime  cefTRIAXone   IVPB 1 Gram(s) IV Intermittent every 24 hours  cholestyramine Powder (Sugar-Free) 4 Gram(s) Oral two times a day  dextrose 5%. 1000 milliLiter(s) (50 mL/Hr) IV Continuous <Continuous>  dextrose 50% Injectable 12.5 Gram(s) IV Push once  dextrose 50% Injectable 25 Gram(s) IV Push once  dextrose 50% Injectable 25 Gram(s) IV Push once  gabapentin 300 milliGRAM(s) Oral two times a day  heparin  Injectable 5000 Unit(s) SubCutaneous every 8 hours  insulin lispro (HumaLOG) corrective regimen sliding scale   SubCutaneous three times a day before meals  insulin lispro (HumaLOG) corrective regimen sliding scale   SubCutaneous at bedtime  isosorbide   mononitrate ER Tablet (IMDUR) 30 milliGRAM(s) Oral at bedtime  isosorbide   mononitrate ER Tablet (IMDUR) 60 milliGRAM(s) Oral daily  lactobacillus acidophilus 1 Tablet(s) Oral three times a day with meals  lidocaine   Patch 1 Patch Transdermal daily  metoprolol tartrate 50 milliGRAM(s) Oral two times a day  metroNIDAZOLE  IVPB 500 milliGRAM(s) IV Intermittent every 8 hours  NIFEdipine XL 60 milliGRAM(s) Oral daily    MEDICATIONS  (PRN):  bismuth subsalicylate Liquid 30 milliLiter(s) Oral three times a day PRN Diarrhea  dextrose 40% Gel 15 Gram(s) Oral once PRN Blood Glucose LESS THAN 70 milliGRAM(s)/deciliter  glucagon  Injectable 1 milliGRAM(s) IntraMuscular once PRN Glucose LESS THAN 70 milligrams/deciliter      Allergies    ertapenem (Urticaria)  Purell (Rash)    Intolerances        Vital Signs Last 24 Hrs  T(C): 36.6 (09 Jan 2019 08:00), Max: 37.4 (08 Jan 2019 20:22)  T(F): 97.8 (09 Jan 2019 08:00), Max: 99.3 (08 Jan 2019 20:22)  HR: 84 (09 Jan 2019 08:00) (78 - 84)  BP: 160/91 (09 Jan 2019 08:00) (124/70 - 160/91)  BP(mean): --  RR: 18 (09 Jan 2019 08:00) (17 - 18)  SpO2: 97% (09 Jan 2019 08:00) (97% - 100%)    I&O's Summary    08 Jan 2019 07:01  -  09 Jan 2019 07:00  --------------------------------------------------------  IN: 1340 mL / OUT: 2930 mL / NET: -1590 mL          PHYSICAL EXAM:  TELE: NSR No events on tele overnight   Constitutional: NAD, awake and alert, well-developed  HEENT: Moist Mucous Membranes, Anicteric  Pulmonary: Non-labored, breath sounds are clear bilaterally, No wheezing, crackles or rhonchi  Cardiovascular: Regular, S1 and S2 nl, No murmurs, rubs, gallops or clicks  Gastrointestinal: Bowel Sounds present, soft, nontender.   Lymph: No lymphadenopathy. No peripheral edema.  Skin: No visible rashes or ulcers.  Psych:  Mood & affect appropriate    LABS: All Labs Reviewed:                        9.9    12.14 )-----------( 283      ( 09 Jan 2019 06:31 )             31.9                         10.3   12.31 )-----------( 253      ( 08 Jan 2019 06:22 )             33.7                         10.4   11.79 )-----------( 243      ( 07 Jan 2019 06:51 )             33.2     09 Jan 2019 06:31    139    |  102    |  10     ----------------------------<  175    3.5     |  30     |  3.40   08 Jan 2019 06:22    138    |  100    |  25     ----------------------------<  109    3.8     |  29     |  5.60   07 Jan 2019 06:51    138    |  100    |  20     ----------------------------<  152    3.6     |  31     |  4.80     Ca    7.2        09 Jan 2019 06:31  Ca    7.4        08 Jan 2019 06:22  Ca    7.4        07 Jan 2019 06:51  Phos  2.1       07 Jan 2019 06:51  Mg     2.0       07 Jan 2019 06:51    TPro  6.2    /  Alb  1.1    /  TBili  0.4    /  DBili  x      /  AST  57     /  ALT  33     /  AlkPhos  309    08 Jan 2019 06:22  TPro  6.3    /  Alb  1.1    /  TBili  0.5    /  DBili  x      /  AST  56     /  ALT  35     /  AlkPhos  370    07 Jan 2019 06:51     from: TTE Echo Doppler w/o Cont (05.04.18 @ 20:56) >     EXAM:  ECHO TTE W/O CON COMP W/DOPPLR      PROCEDURE DATE:  05/04/2018      INTERPRETATION:  Height 165cm. weight 95kg. blood pressure 129/81.   Technician TE. Indication for study dyspnea.    Aortic root 2.3 cm left atrium 4.4 cm left ventricular end-diastolic   diameter 5.7 cm left ventricular end-systolic diameter 4.3 cm septal wall   thickness 1.2 cm posterior wall thickness 1.2 cm visually equivocally   estimated ejection fraction 50-55%.    The aortic root is calcified and of normaldiameter. 3 distinct leaflets   of the aortic valve are not well demonstrated by this study. The   technician was unable to identify any significant gradient across this   valve to suggest hemodynamically significant aortic stenosis. Left atrium   isenlarged. The left ventricle was difficult to visualize by all   standard echocardiographic windows. Possibly the end-diastolic diameter   is mildly increased. The wall thickness is borderline increased. Any   significant focal wall motion abnormality cannot be ascertained by this   study. Visually estimated ejection fraction 50-55%. The mitral annulus is   calcified. The mitral valve leaflets are mildly thickened with a normal   EF slope and excursion. There is no evidence for hemodynamically   significant mitral stenosis. On the very limited color flow Doppler   portion examination mild mitral regurgitation suggested.    Conclusion:  1. Technically difficult limited supine study. Please note that this is a   portable study and the study is also limited due to patient's body   habitus.  2. 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  3. Enlarged left atrium with associated mild mitral regurgitation.  4. Very limited visualization left ventricle which may represent mild   left ventricular concentric hypertrophy with a well-preserved ejection   fraction as described above.  5. A very small posterior pericardial effusion is suggested but not   diagnostic.  6. Correlate these limited findings clinically.    SALVADOR PHILLIPS M.D., ATTENDING CARDIOLOGIST  This document has been electronically signed. May  5 2018  9:47AM      < end of copied text >    < from: CT Chest w/ IV Cont (01.02.19 @ 15:04) >    EXAM:  CT ABDOMEN AND PELVIS IC                          EXAM:  CT CHEST IC                          PROCEDURE DATE:  01/02/2019      INTERPRETATION:  .    CLINICAL INFORMATION: Lower chest pain.    TECHNIQUE: Helical axial images were obtained from the thoracic inlet to   the pubic symphysis. 95 mls of Omnipaque-350 was administered   intravenously without complication and 5 mls were discarded. Oral   contrast was not administered. Sagittal and coronal reconstructed images   were obtained from the source data.    COMPARISON: Most recent prior CT examination of the abdomen and pelvis   from 11/26/2018. Prior MRI examination of the abdomen from 11/27/2018.   Prior chest, abdomen, and pelvis CT examination from 10/29/2018.    FINDINGS: Scattered subcentimeter sized lymph nodes are noted within the   axillary regions, mediastinum, and hilar areas.    The heart size is enlarged. The pericardium appears unremarkable. There   is a right sided dialysis catheter with its tip at the right atrium. A   left-sided IJ central line is noted with the tip at the SVC. No filling   defects are notable within the pulmonary arterial vessels. There are   atherosclerotic calcifications of the imaged coronary arteries, aorta,   and branch vessels. The imaged portions of the aorta are normal in   caliber.    The central airways are patent. Patchy groundglass opacities are notable   throughout both lungs with resultant mosaic attenuation. Scattered areas   of linear atelectasis are notable within the bilateral lung bases, left   upper lobe, and lingula.    There is redemonstration of nonspecific gallbladder wall thickening   and/or edema and/or fat deposition. Sludge and/or stones are noted within   the lumen of the gallbladder. A cholecystostomy tube tract is notable   along the right upper anterior abdominal wall. A small amount of   pneumobilia is noted, compatible with stent patency. The common bile duct   remains dilated and measures up to 1.2 cm. A common bile duct stent is   again noted. No radiopaque stones are notable adjacent to the stent.   Previously noted abscesses within the liver appear less conspicuous   compared to the prior MRI and CT examinations. A small low-attenuation   focus is noted within the central hepatic area measuring up to 8 mm   (series 2, image 114).     The pancreas, spleen, and adrenal glands appear unremarkable.    There is atrophy of the bilateral kidneys. A left-sided renal cyst   appears unchanged. There is minimal nonspecific bilateral perinephric   thickening. There is no hydronephrosis bilaterally. Arterial vascular   calcifications are noted in the vicinity of the bilateral renal   collecting systems. The urinary bladder appears unremarkable.    There are multiple scattered nonspecificsubcentimeter retroperitoneal   and mesenteric lymph nodes.    There is no bowel obstruction or abdominal ascites. A duodenal   diverticulum appears unchanged. Gas and stool are notable throughout the   large bowel loops. The appendix is normal.    The patient is status post hysterectomy. No adnexal masses are noted.    Multilevel degenerative changes notable within the imaged spine. There is   a moderate compression deformity of the L2 vertebral body which appears   unchanged. There is also a mild superior endplate deformity of the T12   vertebral body which also appears unchanged.    Areas of mixed lucency and sclerosis adjacent to the bilateral SI joints   appear unchanged. Mild diffuse osteosclerosis may be compatible with   early renal osteodystrophy.    IMPRESSION:    CT chest:  1. Groundglass mosaic attenuation to the lungs which may reflect small   airways or small vessel disease.    CT abdomen and pelvis:  1. Chronic cholecystitis, as seen on prior exams. A superimposed acute   component cannot be completely excluded.  2. Redemonstration of a common bile duct stent with pneumobilia,   compatible with stent patency.  3. Interval decreased conspicuity of previous noted abscesses throughout   the liver with a small abscess remaining, as discussed.  4. Other chronic nonemergent findings, as discussed.    CARLITO BEE M.D., ATTENDING RADIOLOGIST  This document has been electronically signed. Jan 2 2019  3:30PM      < end of copied text >              [facility]

## 2019-01-09 NOTE — PROGRESS NOTE ADULT - PROBLEM SELECTOR PLAN 3
Pt has PICC in place and course of Invanz/Tigecycline as outpt  - Seems to be getting smaller on CT.  - Continue to treat Klebsiella bacteremia with Rocephin/Flagyl. Pt has PICC in place and 6-week course of Invanz-->Tigecycline as outpt  - abscess seems to be getting smaller on CT.  - Continue to treat Klebsiella bacteremia with Rocephin/Flagyl.

## 2019-01-09 NOTE — PROGRESS NOTE ADULT - ASSESSMENT
67 yo with multiple medical problems with hx of cholangitis      cont current care      f/u outpt for lap kevin when better optimized

## 2019-01-09 NOTE — PROGRESS NOTE ADULT - SUBJECTIVE AND OBJECTIVE BOX
Patient is a 68y old  Female who presents with a chief complaint of abd pain, vomiting (09 Jan 2019 11:25)      INTERVAL HPI/OVERNIGHT EVENTS: Patient seen and examined at bedside. She complained of R shoulder pain which started this morning. Reported diarrhea has improved. Denies abdominal pain, N/V.    MEDICATIONS  (STANDING):  aspirin enteric coated 81 milliGRAM(s) Oral daily  atorvastatin 80 milliGRAM(s) Oral at bedtime  cefTRIAXone   IVPB 1 Gram(s) IV Intermittent every 24 hours  cholestyramine Powder (Sugar-Free) 4 Gram(s) Oral two times a day  dextrose 5%. 1000 milliLiter(s) (50 mL/Hr) IV Continuous <Continuous>  dextrose 50% Injectable 12.5 Gram(s) IV Push once  dextrose 50% Injectable 25 Gram(s) IV Push once  dextrose 50% Injectable 25 Gram(s) IV Push once  gabapentin 300 milliGRAM(s) Oral two times a day  heparin  Injectable 5000 Unit(s) SubCutaneous every 8 hours  insulin lispro (HumaLOG) corrective regimen sliding scale   SubCutaneous three times a day before meals  insulin lispro (HumaLOG) corrective regimen sliding scale   SubCutaneous at bedtime  isosorbide   mononitrate ER Tablet (IMDUR) 30 milliGRAM(s) Oral at bedtime  isosorbide   mononitrate ER Tablet (IMDUR) 60 milliGRAM(s) Oral daily  lactobacillus acidophilus 1 Tablet(s) Oral three times a day with meals  lidocaine   Patch 1 Patch Transdermal daily  metoprolol tartrate 50 milliGRAM(s) Oral two times a day  metroNIDAZOLE  IVPB 500 milliGRAM(s) IV Intermittent every 8 hours  NIFEdipine XL 60 milliGRAM(s) Oral daily    MEDICATIONS  (PRN):  bismuth subsalicylate Liquid 30 milliLiter(s) Oral three times a day PRN Diarrhea  dextrose 40% Gel 15 Gram(s) Oral once PRN Blood Glucose LESS THAN 70 milliGRAM(s)/deciliter  glucagon  Injectable 1 milliGRAM(s) IntraMuscular once PRN Glucose LESS THAN 70 milligrams/deciliter      Allergies    ertapenem (Urticaria)  Purell (Rash)    Intolerances      REVIEW OF SYSTEMS:  CONSTITUTIONAL: No fever or chills  HEENT: No headache, no sore throat  RESPIRATORY: No cough, wheezing, or shortness of breath  CARDIOVASCULAR: No chest pain, palpitations, or leg swelling  GASTROINTESTINAL: No abd pain, nausea, vomiting, or diarrhea  GENITOURINARY: No dysuria, frequency, or hematuria  NEUROLOGICAL: No focal weakness or dizziness  MUSCULOSKELETAL: +R shoulder pain    Vital Signs Last 24 Hrs  T(C): 37.4 (09 Jan 2019 12:30), Max: 37.4 (08 Jan 2019 20:22)  T(F): 99.3 (09 Jan 2019 12:30), Max: 99.3 (08 Jan 2019 20:22)  HR: 80 (09 Jan 2019 12:30) (78 - 84)  BP: 155/82 (09 Jan 2019 12:30) (124/70 - 160/91)  BP(mean): --  RR: 18 (09 Jan 2019 12:30) (17 - 18)  SpO2: 100% (09 Jan 2019 12:30) (97% - 100%)    PHYSICAL EXAM:  GENERAL: NAD  HEENT: EOMI, moist mucous membranes  CHEST/LUNG: CTA b/l, no rales, wheezes, or rhonchi  HEART: RRR, S1, S2  ABDOMEN: BS+, soft, nontender, nondistended  EXTREMITIES: No edema, cyanosis, or calf tenderness. +R shoulder tenderness to palpation with limited ROM  NERVOUS SYSTEM: Answering questions and following commands    LABS:                        9.9    12.14 )-----------( 283      ( 09 Jan 2019 06:31 )             31.9     CBC Full  -  ( 09 Jan 2019 06:31 )  WBC Count : 12.14 K/uL  Hemoglobin : 9.9 g/dL  Hematocrit : 31.9 %  Platelet Count - Automated : 283 K/uL  Mean Cell Volume : 94.7 fl  Mean Cell Hemoglobin : 29.4 pg  Mean Cell Hemoglobin Concentration : 31.0 gm/dL  Auto Neutrophil # : x  Auto Lymphocyte # : x  Auto Monocyte # : x  Auto Eosinophil # : x  Auto Basophil # : x  Auto Neutrophil % : x  Auto Lymphocyte % : x  Auto Monocyte % : x  Auto Eosinophil % : x  Auto Basophil % : x    09 Jan 2019 06:31    139    |  102    |  10     ----------------------------<  175    3.5     |  30     |  3.40     Ca    7.2        09 Jan 2019 06:31      CAPILLARY BLOOD GLUCOSE  POCT Blood Glucose.: 151 mg/dL (09 Jan 2019 12:19)  POCT Blood Glucose.: 180 mg/dL (09 Jan 2019 08:21)  POCT Blood Glucose.: 126 mg/dL (08 Jan 2019 21:53)  POCT Blood Glucose.: 163 mg/dL (08 Jan 2019 17:09)      GI PCR Panel, Stool (collected 01-08-19 @ 21:40)  Source: .Stool Feces  Final Report (01-08-19 @ 23:57):    GI PCR Results: NOT detected    *******Please Note:*******    GI panel PCR evaluates for:    Campylobacter, Plesiomonas shigelloides, Salmonella,    Vibrio, Yersinia enterocolitica, Enteroaggregative    Escherichia coli (EAEC), Enteropathogenic E.coli (EPEC),    Enterotoxigenic E. coli (ETEC) lt/st, Shiga-like    toxin-producing E. coli (STEC) stx1/stx2,    Shigella/ Enteroinvasive E. coli (EIEC), Cryptosporidium,    Cyclospora cayetanensis, Entamoeba histolytica,    Giardia lamblia, Adenovirus F 40/41, Astrovirus,    Norovirus GI/GII, Rotavirus A, Sapovirus    Culture - Blood (collected 01-04-19 @ 07:48)  Source: .Blood Blood-Venous  Final Report (01-09-19 @ 08:00):    No growth at 5 days.    Culture - Blood (collected 01-04-19 @ 07:48)  Source: .Blood Blood-Peripheral  Final Report (01-09-19 @ 08:00):    No growth at 5 days.    Culture - Blood (collected 01-03-19 @ 18:25)  Source: .Blood Blood-Peripheral  Final Report (01-08-19 @ 19:00):    No growth at 5 days.    Culture - Blood (collected 01-03-19 @ 18:25)  Source: .Blood Blood-Peripheral  Final Report (01-08-19 @ 19:00):    No growth at 5 days.    Culture - Blood (collected 01-02-19 @ 18:33)  Source: .Blood Blood-Peripheral  Gram Stain (01-07-19 @ 11:46):    Growth in aerobic bottle: Gram Negative Rods    Growth in anaerobic bottle: Gram Negative Rods  Final Report (01-08-19 @ 10:16):    Growth in aerobic and anaerobic bottles: Klebsiella pneumoniae    See previous culture [id number]    Culture - Blood (collected 01-02-19 @ 18:32)  Source: .Blood Blood-Peripheral  Gram Stain (01-03-19 @ 15:40):    Growth in aerobic and anaerobic bottles: Gram Negative Rods  Final Report (01-05-19 @ 11:36):    Growth in aerobic and anaerobic bottles: Klebsiella pneumoniae    "Due to technical problems, Proteus sp. will Not be reported as part of    the BCID panel until further notice"    ***Blood Panel PCR results on this specimen are available    approximately 3 hours after the Gram stain result.***    Gram stain, PCR, and/or culture results may not always    correspond due to difference in methodologies.    ************************************************************    This PCR assay was performed using [employer].    The following targets are tested for: Enterococcus,    vancomycin resistant enterococci, Listeria monocytogenes,    coagulase negative staphylococci, S. aureus,    methicillin resistant S. aureus, Streptococcus agalactiae    (Group B), S. pneumoniae, S. pyogenes (Group A),    Acinetobacter baumannii, Enterobacter cloacae, E. coli,    Klebsiella oxytoca, K. pneumoniae, Proteus sp.,    Serratia marcescens, Haemophilus influenzae,    Neisseria meningitidis, Pseudomonas aeruginosa, Candida    albicans, C. glabrata, C krusei, C parapsilosis,    C. tropicalis and the KPC resistance gene.  Organism: Blood Culture PCR  Klebsiella pneumoniae (01-05-19 @ 11:36)  Organism: Klebsiella pneumoniae (01-05-19 @ 11:36)      -  Amikacin: S <=8      -  Ampicillin: R >16 These ampicillin results predict results for amoxicillin      -  Ampicillin/Sulbactam: R >16/8      -  Aztreonam: S <=4      -  Cefazolin: R 16      -  Cefepime: S <=2      -  Cefoxitin: S <=4      -  Ceftriaxone: S <=1 Enterobacter, Citrobacter, and Serratia may develop resistance during prolonged therapy      -  Ciprofloxacin: S <=0.5      -  Ertapenem: S <=0.5      -  Gentamicin: S <=1      -  Imipenem: S <=1      -  Levofloxacin: S <=1      -  Meropenem: S <=1      -  Piperacillin/Tazobactam: I 32      -  Tobramycin: S <=2      -  Trimethoprim/Sulfamethoxazole: S <=0.5/9.5      Method Type: KOREY  Organism: Blood Culture PCR (01-05-19 @ 11:36)      -  Klebsiella pneumoniae: Detec      Method Type: PCR        RADIOLOGY & ADDITIONAL TESTS:    Personally reviewed.     Consultant(s) Notes Reviewed:  [x] YES  [ ] NO

## 2019-01-09 NOTE — PROGRESS NOTE ADULT - ASSESSMENT
·	ESRD on HD  ·	Klebsiella bacteremia  ·	s/p recent ERCP and CBD stone extraction @ [facility], h/o Liver abscess  ·	Diabetes  ·	Hypertension    HD tomorrow. To continue HD TIW as scheduled. Fluid removal as tolerated by BP. Dietary and PO fluid restriction.   Monitor BP trend. Titrate BP meds as needed. Salt restriction. ID follow up. IV abx.    Monitor blood sugar levels. Insulin coverage as needed. Surgery follow up.

## 2019-01-09 NOTE — PROGRESS NOTE ADULT - PROBLEM SELECTOR PLAN 1
Pt met sepsis criteria (fever, leukocytosis, source) on admission, suspect 2/2 acute on chronic cholecystitis   - CT Chest/Abd/Pel showed possible acute kevin on chronic kevin, decreased appearance of liver abscess - small abscess remains.  - Continue pain regimen and Tylenol PRN for fever.  - WBC count slightly improved today.  - BCx positive for Klebsiella -- repeat cultures negative so far.  - On IV Zosyn cultures cleared, but sensitivities came back as intermediate to zosyn so continue IV Rocephin and Flagyl (started 1/6) per ID (Flagyl given since pt had liver abscess and in those situations broader coverage recommended) until 1/10. Will then switch to PO Cipro/Flagyl as outpatient for 1 week, per ID.  - Had diarrhea which is improving, GI PCR and C diff negative. Pt met sepsis criteria (fever, leukocytosis, source) on admission, suspect 2/2 acute on chronic cholecystitis   - CT Chest/Abd/Pel showed possible acute kevin on chronic kevin, decreased appearance of liver abscess - small abscess remains.  - Continue pain regimen and Tylenol PRN for fever.  - monitor leukocytosis  - BCx positive for Klebsiella -- repeat cultures negative so far.  - On IV Zosyn cultures cleared, but sensitivities came back as intermediate to zosyn so continue IV Rocephin and Flagyl (started 1/6) per ID (Flagyl given since pt had liver abscess and in those situations broader coverage recommended) until 1/10. Will then switch to PO Cipro/Flagyl as outpatient for 1 week, per ID.  - Had diarrhea which is improving, GI PCR and C diff negative.

## 2019-01-09 NOTE — PROGRESS NOTE ADULT - ASSESSMENT
69 yo F with PMH of ESRD on HD (M, W, F) via permacath started in June 2018, CAD s/p 1 stent 2007, DM type 2, HTN, MI, biliary stent, Liver abscess, obesity who presents to the ED with abdominal pain x2 days, 4 episodes of vomiting. Currently admitted for sepsis 2/2 acute on chronic cholecystitis vs failed treatment of liver abscess. Patient had recent admission at [facility] November 2018 for acute cholecystitis causing sepsis, S/P Perc Noa 10/30 and ESBL E. coli bacteremia. She was treated with IV ertapenem and developed A fIb with RVR. ERCP was attempted at [facility] but was unsuccessful and was transferred to [facility] where ERCP was performed with stone extraction and stent placement. She also had another admission in November 2018 at [facility] shortly after for acute pancreatitis. Patient has been in rehab s/p last discharge from [facility]. Overnight with several episodes of vomiting. Possibly for choly today    - No clear evidence of acute ischemia.  Monitor closely for the development of anginal symptoms or clinical signs of ischemia as patient has history of CAD s/p stent  - Continue asa, Statin, Imdur with hx of CAD s/p 1 stent in 2007. Has been on dual antiplatelet therapy since.  Outpatient cardiologist is Dr Rachel Black in [place].   - Continue to hold Plavix for possible surgical intervention; can resume when cleared by surgery, but this is certainly not obligatory if bleeding is felt to be a concern  - HIDA scan negative.  Per Surgery, no urgent need for noa.  Will fu as outpt   - GI f/u for diarrhea    - No acute changes on EKG compared to previous.  - No meaningful evidence of volume overload.  No significant cardiac murmurs and no cardiac arrhythmias per tele.  Patient is a moderate risk and remains optimized for a non-cardiac procedure.  Please continue routine hemodynamics  - Continue Procardia XL for BP control.  Continue metoprolol  - Monitor and replete lytes, keep K>4, Mg>2.    - Other cardiovascular workup will depend on clinical course.  - All other workup per primary team.  - Will continue to follow.      Ab [facility]  Cardiology

## 2019-01-09 NOTE — PROGRESS NOTE ADULT - PROBLEM SELECTOR PLAN 2
Suspect acute on chronic per CT and symptoms on admission --- HIDA r/out acute kevin at the time of the scan so perhaps pt had obstruction that cleared by time of HIDA  - Continue IV Rocephin and Flagyl as per ID.  - Per Gen Surg (Dr. Campos), pt will benefit from lap kevin but when better surgically optimized.   - Per GI (Dr. Camacho), CBD stent still in place and will need to be removed after lap kevin.

## 2019-01-09 NOTE — PROGRESS NOTE ADULT - PROBLEM SELECTOR PLAN 1
acute on chronic  hida noted, neg for acute kevin  klebsiella bacteremia, repeat cx ngtd  cont abx as per id  diet as tolerated  further plans as per surgery, for op lap kevin when optimized  trend lfts  cbd stent still in place, will plan for eventual removal after cholecystectomy   plan dw attg, agreeable, will follow

## 2019-01-09 NOTE — PROGRESS NOTE ADULT - SUBJECTIVE AND OBJECTIVE BOX
INTERVAL HPI/OVERNIGHT EVENTS:  pt seen and examined  denies n/v/abd pain  c/o diarrhea  per overnight rn pt passing loose brown stool  afebrile overnight labs noted    MEDICATIONS  (STANDING):  aspirin enteric coated 81 milliGRAM(s) Oral daily  atorvastatin 80 milliGRAM(s) Oral at bedtime  cefTRIAXone   IVPB 1 Gram(s) IV Intermittent every 24 hours  cholestyramine Powder (Sugar-Free) 4 Gram(s) Oral daily  dextrose 5%. 1000 milliLiter(s) (50 mL/Hr) IV Continuous <Continuous>  dextrose 50% Injectable 12.5 Gram(s) IV Push once  dextrose 50% Injectable 25 Gram(s) IV Push once  dextrose 50% Injectable 25 Gram(s) IV Push once  gabapentin 300 milliGRAM(s) Oral two times a day  heparin  Injectable 5000 Unit(s) SubCutaneous every 8 hours  insulin lispro (HumaLOG) corrective regimen sliding scale   SubCutaneous three times a day before meals  insulin lispro (HumaLOG) corrective regimen sliding scale   SubCutaneous at bedtime  isosorbide   mononitrate ER Tablet (IMDUR) 30 milliGRAM(s) Oral at bedtime  isosorbide   mononitrate ER Tablet (IMDUR) 60 milliGRAM(s) Oral daily  lactobacillus acidophilus 1 Tablet(s) Oral three times a day with meals  lidocaine   Patch 1 Patch Transdermal daily  metoprolol tartrate 50 milliGRAM(s) Oral two times a day  metroNIDAZOLE  IVPB 500 milliGRAM(s) IV Intermittent every 8 hours  NIFEdipine XL 60 milliGRAM(s) Oral daily    MEDICATIONS  (PRN):  dextrose 40% Gel 15 Gram(s) Oral once PRN Blood Glucose LESS THAN 70 milliGRAM(s)/deciliter  glucagon  Injectable 1 milliGRAM(s) IntraMuscular once PRN Glucose LESS THAN 70 milligrams/deciliter      Allergies    ertapenem (Urticaria)  Purell (Rash)    Intolerances        Review of Systems:    General:  No wt loss, fevers, chills, night sweats, fatigue   Eyes:  Good vision, no reported pain  ENT:  No sore throat, pain, runny nose, dysphagia  CV:  No pain, palpitations, hypo/hypertension  Resp:  No dyspnea, cough, tachypnea, wheezing  GI:  No pain, No nausea, No vomiting, +diarrhea, No constipation, No weight loss, No fever, No pruritis, No rectal bleeding, No melena, No dysphagia  :  No pain, bleeding, incontinence, nocturia  Muscle:  No pain, weakness  Neuro:  No weakness, tingling, memory problems  Psych:  No fatigue, insomnia, mood problems, depression  Endocrine:  No polyuria, polydypsia, cold/heat intolerance  Heme:  No petechiae, ecchymosis, easy bruisability  Skin:  No rash, tattoos, scars, edema      Vital Signs Last 24 Hrs  T(C): 36.6 (09 Jan 2019 08:00), Max: 37.4 (08 Jan 2019 20:22)  T(F): 97.8 (09 Jan 2019 08:00), Max: 99.3 (08 Jan 2019 20:22)  HR: 84 (09 Jan 2019 08:00) (78 - 84)  BP: 160/91 (09 Jan 2019 08:00) (124/70 - 160/91)  BP(mean): --  RR: 18 (09 Jan 2019 08:00) (17 - 18)  SpO2: 97% (09 Jan 2019 08:00) (97% - 100%)    PHYSICAL EXAM:  Constitutional: NAD  HEENT: ncat  Neck: No LAD  Respiratory: dec bs  Cardiovascular: S1 and S2, RRR  Gastrointestinal: obese soft nt nd  Extremities: trace edema  Vascular: 2+ peripheral pulses  Neurological: awake alert  Skin: No rashes      LABS:                        9.9    12.14 )-----------( 283      ( 09 Jan 2019 06:31 )             31.9     01-09    139  |  102  |  10  ----------------------------<  175<H>  3.5   |  30  |  3.40<H>    Ca    7.2<L>      09 Jan 2019 06:31    TPro  6.2  /  Alb  1.1<L>  /  TBili  0.4  /  DBili  x   /  AST  57<H>  /  ALT  33  /  AlkPhos  309<H>  01-08          RADIOLOGY & ADDITIONAL TESTS:

## 2019-01-09 NOTE — PROGRESS NOTE ADULT - SUBJECTIVE AND OBJECTIVE BOX
Patient is a 68y old  Female who presents with a chief complaint of abd pain, vomiting (03 Jan 2019 12:08)      Patient seen in follow up for ESRD on HD. + Klebsiella bacteremia    PAST MEDICAL HISTORY:  Cholecystitis with cholangitis  Acute urinary retention  Liver abscess  ESRD (end stage renal disease) on dialysis  CAD (coronary artery disease)  Diabetes  CRF (chronic renal failure)  Hypertension  Pneumonia  Myocardial infarction  Hypertension  Diabetes    MEDICATIONS  (STANDING):  aspirin enteric coated 81 milliGRAM(s) Oral daily  atorvastatin 80 milliGRAM(s) Oral at bedtime  cefTRIAXone   IVPB 1 Gram(s) IV Intermittent every 24 hours  cholestyramine Powder (Sugar-Free) 4 Gram(s) Oral two times a day  dextrose 5%. 1000 milliLiter(s) (50 mL/Hr) IV Continuous <Continuous>  dextrose 50% Injectable 12.5 Gram(s) IV Push once  dextrose 50% Injectable 25 Gram(s) IV Push once  dextrose 50% Injectable 25 Gram(s) IV Push once  gabapentin 300 milliGRAM(s) Oral two times a day  heparin  Injectable 5000 Unit(s) SubCutaneous every 8 hours  insulin lispro (HumaLOG) corrective regimen sliding scale   SubCutaneous three times a day before meals  insulin lispro (HumaLOG) corrective regimen sliding scale   SubCutaneous at bedtime  isosorbide   mononitrate ER Tablet (IMDUR) 30 milliGRAM(s) Oral at bedtime  isosorbide   mononitrate ER Tablet (IMDUR) 60 milliGRAM(s) Oral daily  lactobacillus acidophilus 1 Tablet(s) Oral three times a day with meals  lidocaine   Patch 1 Patch Transdermal daily  metoprolol tartrate 50 milliGRAM(s) Oral two times a day  metroNIDAZOLE  IVPB 500 milliGRAM(s) IV Intermittent every 8 hours  NIFEdipine XL 60 milliGRAM(s) Oral daily    MEDICATIONS  (PRN):  bismuth subsalicylate Liquid 30 milliLiter(s) Oral three times a day PRN Diarrhea  dextrose 40% Gel 15 Gram(s) Oral once PRN Blood Glucose LESS THAN 70 milliGRAM(s)/deciliter  glucagon  Injectable 1 milliGRAM(s) IntraMuscular once PRN Glucose LESS THAN 70 milligrams/deciliter    T(C): 37.2 (01-09-19 @ 15:21), Max: 37.4 (01-08-19 @ 20:22)  HR: 80 (01-09-19 @ 15:21) (68 - 89)  BP: 143/79 (01-09-19 @ 15:21) (116/73 - 160/91)  RR: 19 (01-09-19 @ 15:21)  SpO2: 98% (01-09-19 @ 15:21)  Wt(kg): --  I&O's Detail    08 Jan 2019 07:01  -  09 Jan 2019 07:00  --------------------------------------------------------  IN:    IV PiggyBack: 200 mL    Oral Fluid: 240 mL    Other: 900 mL  Total IN: 1340 mL    OUT:    Other: 2900 mL    Voided: 30 mL  Total OUT: 2930 mL    Total NET: -1590 mL      PHYSICAL EXAM:  General: NAD  Respiratory: b/l air entry  Cardiovascular: S1 S2  Gastrointestinal: soft  Extremities:  edema                LABORATORY:                        9.9    12.14 )-----------( 283      ( 09 Jan 2019 06:31 )             31.9     01-09    139  |  102  |  10  ----------------------------<  175<H>  3.5   |  30  |  3.40<H>    Ca    7.2<L>      09 Jan 2019 06:31    TPro  6.2  /  Alb  1.1<L>  /  TBili  0.4  /  DBili  x   /  AST  57<H>  /  ALT  33  /  AlkPhos  309<H>  01-08    Sodium, Serum: 139 mmol/L (01-09 @ 06:31)  Sodium, Serum: 138 mmol/L (01-08 @ 06:22)    Potassium, Serum: 3.5 mmol/L (01-09 @ 06:31)  Potassium, Serum: 3.8 mmol/L (01-08 @ 06:22)    Hemoglobin: 9.9 g/dL (01-09 @ 06:31)  Hemoglobin: 10.3 g/dL (01-08 @ 06:22)  Hemoglobin: 10.4 g/dL (01-07 @ 06:51)    Creatinine, Serum 3.40 (01-09 @ 06:31)  Creatinine, Serum 5.60 (01-08 @ 06:22)  Creatinine, Serum 4.80 (01-07 @ 06:51)        LIVER FUNCTIONS - ( 08 Jan 2019 06:22 )  Alb: 1.1 g/dL / Pro: 6.2 g/dL / ALK PHOS: 309 U/L / ALT: 33 U/L / AST: 57 U/L / GGT: x

## 2019-01-10 DIAGNOSIS — D64.9 ANEMIA, UNSPECIFIED: ICD-10-CM

## 2019-01-10 LAB
ALBUMIN SERPL ELPH-MCNC: 1.1 G/DL — LOW (ref 3.3–5)
ALP SERPL-CCNC: 228 U/L — HIGH (ref 40–120)
ALT FLD-CCNC: 38 U/L — SIGNIFICANT CHANGE UP (ref 12–78)
ANION GAP SERPL CALC-SCNC: 7 MMOL/L — SIGNIFICANT CHANGE UP (ref 5–17)
AST SERPL-CCNC: 106 U/L — HIGH (ref 15–37)
BILIRUB SERPL-MCNC: 0.4 MG/DL — SIGNIFICANT CHANGE UP (ref 0.2–1.2)
BUN SERPL-MCNC: 16 MG/DL — SIGNIFICANT CHANGE UP (ref 7–23)
CALCIUM SERPL-MCNC: 7.4 MG/DL — LOW (ref 8.5–10.1)
CHLORIDE SERPL-SCNC: 102 MMOL/L — SIGNIFICANT CHANGE UP (ref 96–108)
CO2 SERPL-SCNC: 29 MMOL/L — SIGNIFICANT CHANGE UP (ref 22–31)
CREAT SERPL-MCNC: 4.4 MG/DL — HIGH (ref 0.5–1.3)
GLUCOSE SERPL-MCNC: 94 MG/DL — SIGNIFICANT CHANGE UP (ref 70–99)
HCT VFR BLD CALC: 30.1 % — LOW (ref 34.5–45)
HGB BLD-MCNC: 9.2 G/DL — LOW (ref 11.5–15.5)
MCHC RBC-ENTMCNC: 29 PG — SIGNIFICANT CHANGE UP (ref 27–34)
MCHC RBC-ENTMCNC: 30.6 GM/DL — LOW (ref 32–36)
MCV RBC AUTO: 95 FL — SIGNIFICANT CHANGE UP (ref 80–100)
NRBC # BLD: 0 /100 WBCS — SIGNIFICANT CHANGE UP (ref 0–0)
PLATELET # BLD AUTO: 310 K/UL — SIGNIFICANT CHANGE UP (ref 150–400)
POTASSIUM SERPL-MCNC: 3.6 MMOL/L — SIGNIFICANT CHANGE UP (ref 3.5–5.3)
POTASSIUM SERPL-SCNC: 3.6 MMOL/L — SIGNIFICANT CHANGE UP (ref 3.5–5.3)
PROT SERPL-MCNC: 6.1 G/DL — SIGNIFICANT CHANGE UP (ref 6–8.3)
RBC # BLD: 3.17 M/UL — LOW (ref 3.8–5.2)
RBC # FLD: 20.2 % — HIGH (ref 10.3–14.5)
SODIUM SERPL-SCNC: 138 MMOL/L — SIGNIFICANT CHANGE UP (ref 135–145)
WBC # BLD: 10.9 K/UL — HIGH (ref 3.8–10.5)
WBC # FLD AUTO: 10.9 K/UL — HIGH (ref 3.8–10.5)

## 2019-01-10 PROCEDURE — 99232 SBSQ HOSP IP/OBS MODERATE 35: CPT

## 2019-01-10 PROCEDURE — 99232 SBSQ HOSP IP/OBS MODERATE 35: CPT | Mod: GC

## 2019-01-10 RX ORDER — PANTOPRAZOLE SODIUM 20 MG/1
40 TABLET, DELAYED RELEASE ORAL
Qty: 0 | Refills: 0 | Status: DISCONTINUED | OUTPATIENT
Start: 2019-01-10 | End: 2019-01-14

## 2019-01-10 RX ORDER — CIPROFLOXACIN LACTATE 400MG/40ML
250 VIAL (ML) INTRAVENOUS AT BEDTIME
Qty: 0 | Refills: 0 | Status: DISCONTINUED | OUTPATIENT
Start: 2019-01-10 | End: 2019-01-14

## 2019-01-10 RX ORDER — METRONIDAZOLE 500 MG
500 TABLET ORAL EVERY 8 HOURS
Qty: 0 | Refills: 0 | Status: DISCONTINUED | OUTPATIENT
Start: 2019-01-10 | End: 2019-01-14

## 2019-01-10 RX ORDER — ERYTHROPOIETIN 10000 [IU]/ML
10000 INJECTION, SOLUTION INTRAVENOUS; SUBCUTANEOUS
Qty: 0 | Refills: 0 | Status: DISCONTINUED | OUTPATIENT
Start: 2019-01-10 | End: 2019-01-14

## 2019-01-10 RX ORDER — GABAPENTIN 400 MG/1
300 CAPSULE ORAL AT BEDTIME
Qty: 0 | Refills: 0 | Status: DISCONTINUED | OUTPATIENT
Start: 2019-01-10 | End: 2019-01-14

## 2019-01-10 RX ADMIN — ISOSORBIDE MONONITRATE 30 MILLIGRAM(S): 60 TABLET, EXTENDED RELEASE ORAL at 22:08

## 2019-01-10 RX ADMIN — Medication 1: at 17:03

## 2019-01-10 RX ADMIN — GABAPENTIN 300 MILLIGRAM(S): 400 CAPSULE ORAL at 22:08

## 2019-01-10 RX ADMIN — ATORVASTATIN CALCIUM 80 MILLIGRAM(S): 80 TABLET, FILM COATED ORAL at 22:08

## 2019-01-10 RX ADMIN — Medication 100 MILLIGRAM(S): at 05:11

## 2019-01-10 RX ADMIN — HEPARIN SODIUM 5000 UNIT(S): 5000 INJECTION INTRAVENOUS; SUBCUTANEOUS at 22:09

## 2019-01-10 RX ADMIN — Medication 250 MILLIGRAM(S): at 22:08

## 2019-01-10 RX ADMIN — Medication 500 MILLIGRAM(S): at 22:09

## 2019-01-10 RX ADMIN — Medication 81 MILLIGRAM(S): at 13:34

## 2019-01-10 RX ADMIN — Medication 1 TABLET(S): at 17:02

## 2019-01-10 RX ADMIN — Medication 60 MILLIGRAM(S): at 05:11

## 2019-01-10 RX ADMIN — GABAPENTIN 300 MILLIGRAM(S): 400 CAPSULE ORAL at 05:11

## 2019-01-10 RX ADMIN — HEPARIN SODIUM 5000 UNIT(S): 5000 INJECTION INTRAVENOUS; SUBCUTANEOUS at 05:10

## 2019-01-10 RX ADMIN — Medication 1 TABLET(S): at 07:37

## 2019-01-10 RX ADMIN — ISOSORBIDE MONONITRATE 60 MILLIGRAM(S): 60 TABLET, EXTENDED RELEASE ORAL at 13:35

## 2019-01-10 RX ADMIN — Medication 50 MILLIGRAM(S): at 17:04

## 2019-01-10 RX ADMIN — LIDOCAINE 1 PATCH: 4 CREAM TOPICAL at 13:34

## 2019-01-10 RX ADMIN — ERYTHROPOIETIN 10000 UNIT(S): 10000 INJECTION, SOLUTION INTRAVENOUS; SUBCUTANEOUS at 12:33

## 2019-01-10 RX ADMIN — Medication 50 MILLIGRAM(S): at 05:11

## 2019-01-10 RX ADMIN — CHOLESTYRAMINE 4 GRAM(S): 4 POWDER, FOR SUSPENSION ORAL at 20:39

## 2019-01-10 RX ADMIN — HEPARIN SODIUM 5000 UNIT(S): 5000 INJECTION INTRAVENOUS; SUBCUTANEOUS at 13:34

## 2019-01-10 RX ADMIN — Medication 500 MILLIGRAM(S): at 13:35

## 2019-01-10 NOTE — PROGRESS NOTE ADULT - PROBLEM SELECTOR PLAN 1
Pt met sepsis criteria (fever, leukocytosis, source) on admission, suspect 2/2 acute on chronic cholecystitis   - CT Chest/Abd/Pel showed possible acute kevin on chronic kevin, decreased appearance of liver abscess - small abscess remains.  - Continue pain regimen and Tylenol PRN for fever.  - Monitor leukocytosis.  - BCx positive for Klebsiella -- repeat cultures negative so far.  - On IV Zosyn cultures cleared, but sensitivities came back as intermediate to zosyn so continue IV Rocephin and Flagyl (started 1/6) per ID (Flagyl given since pt had liver abscess and in those situations broader coverage recommended) until today 1/10. Will then switch to PO Cipro/Flagyl as outpatient for 1 week, per ID.  - Had diarrhea which is improving, GI PCR and C diff negative.

## 2019-01-10 NOTE — PROGRESS NOTE ADULT - PROBLEM SELECTOR PLAN 1
will convert PO cipro/flagyl recommend until 1/17  - possible cholecystectomy when patient is improved in the nonacute setting  -ordered GI-pcr with diarrhea reported

## 2019-01-10 NOTE — PROGRESS NOTE ADULT - ASSESSMENT
·	ESRD on HD  ·	Klebsiella bacteremia  ·	s/p recent ERCP and CBD stone extraction @ [facility], h/o Liver abscess  ·	Diabetes  ·	Hypertension    HD today. To continue HD TIW as scheduled. Fluid removal as tolerated by BP. Dietary and PO fluid restriction.   Monitor BP trend. Titrate BP meds as needed. Salt restriction. ID follow up. IV abx.    Monitor blood sugar levels. Insulin coverage as needed. Surgery follow up.

## 2019-01-10 NOTE — PROGRESS NOTE ADULT - SUBJECTIVE AND OBJECTIVE BOX
pt seen  no complaints  at dialysis  ICU Vital Signs Last 24 Hrs  T(C): 37.2 (10 Wei 2019 07:48), Max: 37.7 (09 Jan 2019 19:37)  T(F): 99 (10 Wei 2019 07:48), Max: 99.8 (09 Jan 2019 19:37)  HR: 67 (10 Wei 2019 07:48) (67 - 80)  BP: 156/81 (10 Wei 2019 07:48) (143/79 - 156/81)  BP(mean): --  ABP: --  ABP(mean): --  RR: 18 (10 Wei 2019 07:48) (17 - 19)  SpO2: 96% (10 Wei 2019 07:48) (96% - 100%)  gen-NAD  resp-clear  abd-soft NT/ND

## 2019-01-10 NOTE — PROGRESS NOTE ADULT - PROBLEM SELECTOR PLAN 1
acute on chronic  hida noted, neg for acute kevin  klebsiella bacteremia, repeat cx ngtd  cont abx as per id  diet as tolerated  further plans as per surgery, for op lap kevin when optimized  trend lfts  cbd stent still in place, no plans for stent removal at present, will plan for eventual removal after cholecystectomy  will follow

## 2019-01-10 NOTE — PROGRESS NOTE ADULT - SUBJECTIVE AND OBJECTIVE BOX
INTERVAL HPI/OVERNIGHT EVENTS:  pt seen and examined  denies n/v/abd pain, states diarrhea better  per overnight rn no bms overnight, 2 bms yesterday  afebrile overnight labs noted    MEDICATIONS  (STANDING):  aspirin enteric coated 81 milliGRAM(s) Oral daily  atorvastatin 80 milliGRAM(s) Oral at bedtime  cefTRIAXone   IVPB 1 Gram(s) IV Intermittent every 24 hours  cholestyramine Powder (Sugar-Free) 4 Gram(s) Oral two times a day  dextrose 5%. 1000 milliLiter(s) (50 mL/Hr) IV Continuous <Continuous>  dextrose 50% Injectable 12.5 Gram(s) IV Push once  dextrose 50% Injectable 25 Gram(s) IV Push once  dextrose 50% Injectable 25 Gram(s) IV Push once  gabapentin 300 milliGRAM(s) Oral two times a day  heparin  Injectable 5000 Unit(s) SubCutaneous every 8 hours  insulin lispro (HumaLOG) corrective regimen sliding scale   SubCutaneous three times a day before meals  insulin lispro (HumaLOG) corrective regimen sliding scale   SubCutaneous at bedtime  isosorbide   mononitrate ER Tablet (IMDUR) 30 milliGRAM(s) Oral at bedtime  isosorbide   mononitrate ER Tablet (IMDUR) 60 milliGRAM(s) Oral daily  lactobacillus acidophilus 1 Tablet(s) Oral three times a day with meals  lidocaine   Patch 1 Patch Transdermal daily  metoprolol tartrate 50 milliGRAM(s) Oral two times a day  metroNIDAZOLE  IVPB 500 milliGRAM(s) IV Intermittent every 8 hours  NIFEdipine XL 60 milliGRAM(s) Oral daily    MEDICATIONS  (PRN):  bismuth subsalicylate Liquid 30 milliLiter(s) Oral three times a day PRN Diarrhea  dextrose 40% Gel 15 Gram(s) Oral once PRN Blood Glucose LESS THAN 70 milliGRAM(s)/deciliter  glucagon  Injectable 1 milliGRAM(s) IntraMuscular once PRN Glucose LESS THAN 70 milligrams/deciliter      Allergies    ertapenem (Urticaria)  Purell (Rash)    Intolerances        Review of Systems:    General:  No wt loss, fevers, chills, night sweats, fatigue   Eyes:  Good vision, no reported pain  ENT:  No sore throat, pain, runny nose, dysphagia  CV:  No pain, palpitations, hypo/hypertension  Resp:  No dyspnea, cough, tachypnea, wheezing  GI:  No pain, No nausea, No vomiting, No diarrhea, No constipation, No weight loss, No fever, No pruritis, No rectal bleeding, No melena, No dysphagia  :  No pain, bleeding, incontinence, nocturia  Muscle:  No pain, weakness  Neuro:  No weakness, tingling, memory problems  Psych:  No fatigue, insomnia, mood problems, depression  Endocrine:  No polyuria, polydypsia, cold/heat intolerance  Heme:  No petechiae, ecchymosis, easy bruisability  Skin:  No rash, tattoos, scars, edema      Vital Signs Last 24 Hrs  T(C): 37.2 (10 Wei 2019 07:48), Max: 37.7 (09 Jan 2019 19:37)  T(F): 99 (10 Wei 2019 07:48), Max: 99.8 (09 Jan 2019 19:37)  HR: 67 (10 Wei 2019 07:48) (67 - 80)  BP: 156/81 (10 Wei 2019 07:48) (143/79 - 156/81)  BP(mean): --  RR: 18 (10 Wei 2019 07:48) (17 - 19)  SpO2: 96% (10 Wei 2019 07:48) (96% - 100%)    PHYSICAL EXAM:  Constitutional: NAD  HEENT: ncat  Neck: No LAD  Respiratory: dec bs  Cardiovascular: S1 and S2, RRR  Gastrointestinal: obese soft nt nd  Extremities: trace edema  Vascular: 2+ peripheral pulses  Neurological: awake alert  Skin: No rashes      LABS:                        9.2    10.90 )-----------( 310      ( 10 Wei 2019 07:20 )             30.1     01-10    138  |  102  |  16  ----------------------------<  94  3.6   |  29  |  4.40<H>    Ca    7.4<L>      10 Wei 2019 07:20    TPro  6.1  /  Alb  1.1<L>  /  TBili  0.4  /  DBili  x   /  AST  106<H>  /  ALT  38  /  AlkPhos  228<H>  01-10          RADIOLOGY & ADDITIONAL TESTS:

## 2019-01-10 NOTE — PROGRESS NOTE ADULT - ASSESSMENT
69 yo F with PMH of ESRD on HD (M, W, F), CAD s/p stents 2007, DM, HTN, MI, biliary stent, Liver abscess who presents to the ED with abdominal pain x2 days, 4 episodes of vomiting, leukocytosis and Klebsiella bacteremia  Source not defined but my concern remains that it is related to the gallbaldder.  Patient's exam now benign  Question whether she may be intermittently obstructing her cystic duct, causing episodes of pain.  She is clearly at risk for complications related to surgery in her current state   second surgical opinion planned.    Now with diarrhea, several patients with norovirus on floor will order GI-pcr

## 2019-01-10 NOTE — PROGRESS NOTE ADULT - PROBLEM SELECTOR PLAN 5
s/p stent 2007  - Continue home dose atorvastatin, metoprolol (with hold parameters) and aspirin.  - Had been holding Plavix for possible lap kevin -- check with cardio if need to be on DAPT given how old stent was. If cardio wishes to restart, will restart.

## 2019-01-10 NOTE — PROGRESS NOTE ADULT - SUBJECTIVE AND OBJECTIVE BOX
infectious diseases progress note:    IRENE TAYLOR is a 68y y. o. Female patient    Patient with o concerning overnight events    Allergies    ertapenem (Urticaria)  Purell (Rash)    Intolerances        ANTIBIOTICS/RELEVANT:  antimicrobials  cefTRIAXone   IVPB 1 Gram(s) IV Intermittent every 24 hours  metroNIDAZOLE  IVPB 500 milliGRAM(s) IV Intermittent every 8 hours    immunologic:    OTHER:  aspirin enteric coated 81 milliGRAM(s) Oral daily  atorvastatin 80 milliGRAM(s) Oral at bedtime  bismuth subsalicylate Liquid 30 milliLiter(s) Oral three times a day PRN  cholestyramine Powder (Sugar-Free) 4 Gram(s) Oral two times a day  dextrose 40% Gel 15 Gram(s) Oral once PRN  dextrose 5%. 1000 milliLiter(s) IV Continuous <Continuous>  dextrose 50% Injectable 12.5 Gram(s) IV Push once  dextrose 50% Injectable 25 Gram(s) IV Push once  dextrose 50% Injectable 25 Gram(s) IV Push once  gabapentin 300 milliGRAM(s) Oral two times a day  glucagon  Injectable 1 milliGRAM(s) IntraMuscular once PRN  heparin  Injectable 5000 Unit(s) SubCutaneous every 8 hours  insulin lispro (HumaLOG) corrective regimen sliding scale   SubCutaneous three times a day before meals  insulin lispro (HumaLOG) corrective regimen sliding scale   SubCutaneous at bedtime  isosorbide   mononitrate ER Tablet (IMDUR) 30 milliGRAM(s) Oral at bedtime  isosorbide   mononitrate ER Tablet (IMDUR) 60 milliGRAM(s) Oral daily  lactobacillus acidophilus 1 Tablet(s) Oral three times a day with meals  lidocaine   Patch 1 Patch Transdermal daily  metoprolol tartrate 50 milliGRAM(s) Oral two times a day  NIFEdipine XL 60 milliGRAM(s) Oral daily      Objective:  Vital Signs Last 24 Hrs  T(C): 37.2 (10 Wei 2019 07:48), Max: 37.7 (09 Jan 2019 19:37)  T(F): 99 (10 Wei 2019 07:48), Max: 99.8 (09 Jan 2019 19:37)  HR: 67 (10 Wei 2019 07:48) (67 - 80)  BP: 156/81 (10 Wei 2019 07:48) (143/79 - 156/81)  BP(mean): --  RR: 18 (10 Wei 2019 07:48) (17 - 19)  SpO2: 96% (10 Wei 2019 07:48) (96% - 100%)    T(C): 37.2 (01-10-19 @ 07:48), Max: 37.7 (01-09-19 @ 19:37)  T(C): 37.2 (01-10-19 @ 07:48), Max: 37.7 (01-09-19 @ 19:37)  T(C): 37.2 (01-10-19 @ 07:48), Max: 37.7 (01-09-19 @ 19:37)    PHYSICAL EXAM:  Constitutional: Well-developed, well nourished  Eyes: PERRLA, EOMI  Ear/Nose/Throat: oropharynx normal	  Neck: no JVD, no lymphadenopathy, supple  Respiratory: no accessory muscle use, CTA  Cardiovascular: RRR,   Gastrointestinal: soft, NT  Extremities: no clubbing, no cyanosis, edema absent      LABS:                        9.2    10.90 )-----------( 310      ( 10 Wei 2019 07:20 )             30.1       10.90 01-10 @ 07:20  12.14 01-09 @ 06:31  12.31 01-08 @ 06:22  11.79 01-07 @ 06:51  11.69 01-06 @ 08:10  14.66 01-05 @ 06:17  12.71 01-04 @ 06:43      01-10    138  |  102  |  16  ----------------------------<  94  3.6   |  29  |  4.40<H>    Ca    7.4<L>      10 Wei 2019 07:20    TPro  6.1  /  Alb  1.1<L>  /  TBili  0.4  /  DBili  x   /  AST  106<H>  /  ALT  38  /  AlkPhos  228<H>  01-10      Creatinine, Serum: 4.40 mg/dL (01-10-19 @ 07:20)  Creatinine, Serum: 3.40 mg/dL (01-09-19 @ 06:31)  Creatinine, Serum: 5.60 mg/dL (01-08-19 @ 06:22)  Creatinine, Serum: 4.80 mg/dL (01-07-19 @ 06:51)  Creatinine, Serum: 3.80 mg/dL (01-06-19 @ 08:10)  Creatinine, Serum: 5.40 mg/dL (01-05-19 @ 06:17)  Creatinine, Serum: 3.90 mg/dL (01-04-19 @ 06:43)                MICROBIOLOGY:              RADIOLOGY & ADDITIONAL STUDIES:

## 2019-01-10 NOTE — PROGRESS NOTE ADULT - SUBJECTIVE AND OBJECTIVE BOX
Patient is a 68y old  Female who presents with a chief complaint of abd pain, vomiting (09 Jan 2019 15:47)      INTERVAL HPI/OVERNIGHT EVENTS: Patient seen and examined at bedside. Reports that she still has R shoulder pain but it has improved with lidocaine patch and hot pack. Reports further improvement in diarrhea. Denies abdominal pain, nausea or vomiting.     MEDICATIONS  (STANDING):  aspirin enteric coated 81 milliGRAM(s) Oral daily  atorvastatin 80 milliGRAM(s) Oral at bedtime  cefTRIAXone   IVPB 1 Gram(s) IV Intermittent every 24 hours  cholestyramine Powder (Sugar-Free) 4 Gram(s) Oral two times a day  dextrose 5%. 1000 milliLiter(s) (50 mL/Hr) IV Continuous <Continuous>  dextrose 50% Injectable 12.5 Gram(s) IV Push once  dextrose 50% Injectable 25 Gram(s) IV Push once  dextrose 50% Injectable 25 Gram(s) IV Push once  gabapentin 300 milliGRAM(s) Oral two times a day  heparin  Injectable 5000 Unit(s) SubCutaneous every 8 hours  insulin lispro (HumaLOG) corrective regimen sliding scale   SubCutaneous three times a day before meals  insulin lispro (HumaLOG) corrective regimen sliding scale   SubCutaneous at bedtime  isosorbide   mononitrate ER Tablet (IMDUR) 30 milliGRAM(s) Oral at bedtime  isosorbide   mononitrate ER Tablet (IMDUR) 60 milliGRAM(s) Oral daily  lactobacillus acidophilus 1 Tablet(s) Oral three times a day with meals  lidocaine   Patch 1 Patch Transdermal daily  metoprolol tartrate 50 milliGRAM(s) Oral two times a day  metroNIDAZOLE  IVPB 500 milliGRAM(s) IV Intermittent every 8 hours  NIFEdipine XL 60 milliGRAM(s) Oral daily    MEDICATIONS  (PRN):  bismuth subsalicylate Liquid 30 milliLiter(s) Oral three times a day PRN Diarrhea  dextrose 40% Gel 15 Gram(s) Oral once PRN Blood Glucose LESS THAN 70 milliGRAM(s)/deciliter  glucagon  Injectable 1 milliGRAM(s) IntraMuscular once PRN Glucose LESS THAN 70 milligrams/deciliter      Allergies    ertapenem (Urticaria)  Purell (Rash)    Intolerances      REVIEW OF SYSTEMS:  CONSTITUTIONAL: No fever or chills  HEENT: No headache, no sore throat  RESPIRATORY: No cough, wheezing, or shortness of breath  CARDIOVASCULAR: No chest pain, palpitations, or leg swelling  GASTROINTESTINAL: No abd pain, nausea, vomiting, or diarrhea  GENITOURINARY: No dysuria, frequency, or hematuria  NEUROLOGICAL: No focal weakness or dizziness  MUSCULOSKELETAL: +R shoulder pain (improving)    Vital Signs Last 24 Hrs  T(C): 37.2 (10 Wei 2019 07:48), Max: 37.7 (09 Jan 2019 19:37)  T(F): 99 (10 Wei 2019 07:48), Max: 99.8 (09 Jan 2019 19:37)  HR: 67 (10 Wei 2019 07:48) (67 - 80)  BP: 156/81 (10 Wei 2019 07:48) (143/79 - 156/81)  BP(mean): --  RR: 18 (10 Wei 2019 07:48) (17 - 19)  SpO2: 96% (10 Wei 2019 07:48) (96% - 100%)    PHYSICAL EXAM:  GENERAL: NAD  HEENT: EOMI, moist mucous membranes  CHEST/LUNG: CTA b/l, no rales, wheezes, or rhonchi  HEART: RRR, S1, S2  ABDOMEN: BS+, soft, nontender, nondistended  EXTREMITIES: No edema, cyanosis, or calf tenderness  NERVOUS SYSTEM: Answering questions and following commands      LABS:                        9.2    10.90 )-----------( 310      ( 10 Wei 2019 07:20 )             30.1     CBC Full  -  ( 10 Wei 2019 07:20 )  WBC Count : 10.90 K/uL  Hemoglobin : 9.2 g/dL  Hematocrit : 30.1 %  Platelet Count - Automated : 310 K/uL  Mean Cell Volume : 95.0 fl  Mean Cell Hemoglobin : 29.0 pg  Mean Cell Hemoglobin Concentration : 30.6 gm/dL  Auto Neutrophil # : x  Auto Lymphocyte # : x  Auto Monocyte # : x  Auto Eosinophil # : x  Auto Basophil # : x  Auto Neutrophil % : x  Auto Lymphocyte % : x  Auto Monocyte % : x  Auto Eosinophil % : x  Auto Basophil % : x    10 Wei 2019 07:20    138    |  102    |  16     ----------------------------<  94     3.6     |  29     |  4.40     Ca    7.4        10 Wei 2019 07:20    TPro  6.1    /  Alb  1.1    /  TBili  0.4    /  DBili  x      /  AST  106    /  ALT  38     /  AlkPhos  228    10 Wei 2019 07:20      CAPILLARY BLOOD GLUCOSE  POCT Blood Glucose.: 89 mg/dL (10 Wei 2019 07:29)  POCT Blood Glucose.: 96 mg/dL (09 Jan 2019 21:40)  POCT Blood Glucose.: 164 mg/dL (09 Jan 2019 16:55)  POCT Blood Glucose.: 151 mg/dL (09 Jan 2019 12:19)      GI PCR Panel, Stool (collected 01-08-19 @ 21:40)  Source: .Stool Feces  Final Report (01-08-19 @ 23:57):    GI PCR Results: NOT detected    *******Please Note:*******    GI panel PCR evaluates for:    Campylobacter, Plesiomonas shigelloides, Salmonella,    Vibrio, Yersinia enterocolitica, Enteroaggregative    Escherichia coli (EAEC), Enteropathogenic E.coli (EPEC),    Enterotoxigenic E. coli (ETEC) lt/st, Shiga-like    toxin-producing E. coli (STEC) stx1/stx2,    Shigella/ Enteroinvasive E. coli (EIEC), Cryptosporidium,    Cyclospora cayetanensis, Entamoeba histolytica,    Giardia lamblia, Adenovirus F 40/41, Astrovirus,    Norovirus GI/GII, Rotavirus A, Sapovirus    Culture - Blood (collected 01-04-19 @ 07:48)  Source: .Blood Blood-Venous  Final Report (01-09-19 @ 08:00):    No growth at 5 days.    Culture - Blood (collected 01-04-19 @ 07:48)  Source: .Blood Blood-Peripheral  Final Report (01-09-19 @ 08:00):    No growth at 5 days.    Culture - Blood (collected 01-03-19 @ 18:25)  Source: .Blood Blood-Peripheral  Final Report (01-08-19 @ 19:00):    No growth at 5 days.    Culture - Blood (collected 01-03-19 @ 18:25)  Source: .Blood Blood-Peripheral  Final Report (01-08-19 @ 19:00):    No growth at 5 days.        RADIOLOGY & ADDITIONAL TESTS:    Personally reviewed.     Consultant(s) Notes Reviewed:  [x] YES  [ ] NO

## 2019-01-10 NOTE — PROGRESS NOTE ADULT - SUBJECTIVE AND OBJECTIVE BOX
[facility] -- Glynn Bullock, Claudia Davis Pannella, Patel, Savella  Office # [phone]      Follow Up:    preop eval/cad  Subjective/Observations:   No events overnight resting comfortably in bed.  No complaints of chest pain, dyspnea, or palpitations reported. No signs of orthopnea or PND.     REVIEW OF SYSTEMS: All other review of systems is negative unless indicated above    PAST MEDICAL & SURGICAL HISTORY:  Cholecystitis with cholangitis  Acute urinary retention  Liver abscess  ESRD (end stage renal disease) on dialysis: MWF  CAD (coronary artery disease): s/p stent in 2007  Diabetes  Myocardial infarction  Hypertension  H/O: hysterectomy      MEDICATIONS  (STANDING):  aspirin enteric coated 81 milliGRAM(s) Oral daily  atorvastatin 80 milliGRAM(s) Oral at bedtime  cholestyramine Powder (Sugar-Free) 4 Gram(s) Oral two times a day  ciprofloxacin     Tablet 250 milliGRAM(s) Oral at bedtime  dextrose 5%. 1000 milliLiter(s) (50 mL/Hr) IV Continuous <Continuous>  dextrose 50% Injectable 12.5 Gram(s) IV Push once  dextrose 50% Injectable 25 Gram(s) IV Push once  dextrose 50% Injectable 25 Gram(s) IV Push once  epoetin analilia Injectable 75627 Unit(s) IV Push <User Schedule>  gabapentin 300 milliGRAM(s) Oral two times a day  heparin  Injectable 5000 Unit(s) SubCutaneous every 8 hours  insulin lispro (HumaLOG) corrective regimen sliding scale   SubCutaneous three times a day before meals  insulin lispro (HumaLOG) corrective regimen sliding scale   SubCutaneous at bedtime  isosorbide   mononitrate ER Tablet (IMDUR) 30 milliGRAM(s) Oral at bedtime  isosorbide   mononitrate ER Tablet (IMDUR) 60 milliGRAM(s) Oral daily  lactobacillus acidophilus 1 Tablet(s) Oral three times a day with meals  lidocaine   Patch 1 Patch Transdermal daily  metoprolol tartrate 50 milliGRAM(s) Oral two times a day  metroNIDAZOLE    Tablet 500 milliGRAM(s) Oral every 8 hours  NIFEdipine XL 60 milliGRAM(s) Oral daily    MEDICATIONS  (PRN):  bismuth subsalicylate Liquid 30 milliLiter(s) Oral three times a day PRN Diarrhea  dextrose 40% Gel 15 Gram(s) Oral once PRN Blood Glucose LESS THAN 70 milliGRAM(s)/deciliter  glucagon  Injectable 1 milliGRAM(s) IntraMuscular once PRN Glucose LESS THAN 70 milligrams/deciliter      Allergies    ertapenem (Urticaria)  Purell (Rash)    Intolerances        Vital Signs Last 24 Hrs  T(C): 37.2 (10 Wei 2019 07:48), Max: 37.7 (09 Jan 2019 19:37)  T(F): 99 (10 Wei 2019 07:48), Max: 99.8 (09 Jan 2019 19:37)  HR: 67 (10 Wei 2019 07:48) (67 - 80)  BP: 156/81 (10 Wei 2019 07:48) (143/79 - 156/81)  BP(mean): --  RR: 18 (10 Wei 2019 07:48) (17 - 19)  SpO2: 96% (10 Wei 2019 07:48) (96% - 99%)    I&O's Summary    09 Jan 2019 07:01  -  10 Wei 2019 07:00  --------------------------------------------------------  IN: 440 mL / OUT: 200 mL / NET: 240 mL    10 Wei 2019 07:01  -  10 Wei 2019 12:30  --------------------------------------------------------  IN: 0 mL / OUT: 100 mL / NET: -100 mL          PHYSICAL EXAM:  TELE: NSR @ 60 No events on tele overnight   Constitutional: NAD, awake and alert, well-developed  HEENT: Moist Mucous Membranes, Anicteric  Pulmonary: Non-labored, breath sounds with crackles bilaterally at bases , No wheezing, or rhonchi   Cardiovascular: Regular, S1 and S2 nl, No murmurs, rubs, gallops or clicks  Gastrointestinal: Bowel Sounds present, soft, nontender.   Lymph: No lymphadenopathy. No peripheral edema.  Skin: No visible rashes or ulcers.  Psych:  Mood & affect appropriate    LABS: All Labs Reviewed:                        9.2    10.90 )-----------( 310      ( 10 Wei 2019 07:20 )             30.1                         9.9    12.14 )-----------( 283      ( 09 Jan 2019 06:31 )             31.9                         10.3   12.31 )-----------( 253      ( 08 Jan 2019 06:22 )             33.7     10 Wie 2019 07:20    138    |  102    |  16     ----------------------------<  94     3.6     |  29     |  4.40   09 Jan 2019 06:31    139    |  102    |  10     ----------------------------<  175    3.5     |  30     |  3.40   08 Jan 2019 06:22    138    |  100    |  25     ----------------------------<  109    3.8     |  29     |  5.60     Ca    7.4        10 Wei 2019 07:20  Ca    7.2        09 Jan 2019 06:31  Ca    7.4        08 Jan 2019 06:22    TPro  6.1    /  Alb  1.1    /  TBili  0.4    /  DBili  x      /  AST  106    /  ALT  38     /  AlkPhos  228    10 Wei 2019 07:20  TPro  6.2    /  Alb  1.1    /  TBili  0.4    /  DBili  x      /  AST  57     /  ALT  33     /  AlkPhos  309    08 Jan 2019 06:22        from: TTE Echo Doppler w/o Cont (05.04.18 @ 20:56) >     EXAM:  ECHO TTE W/O CON COMP W/DOPPLR      PROCEDURE DATE:  05/04/2018      INTERPRETATION:  Height 165cm. weight 95kg. blood pressure 129/81.   Technician TE. Indication for study dyspnea.    Aortic root 2.3 cm left atrium 4.4 cm left ventricular end-diastolic   diameter 5.7 cm left ventricular end-systolic diameter 4.3 cm septal wall   thickness 1.2 cm posterior wall thickness 1.2 cm visually equivocally   estimated ejection fraction 50-55%.    The aortic root is calcified and of normaldiameter. 3 distinct leaflets   of the aortic valve are not well demonstrated by this study. The   technician was unable to identify any significant gradient across this   valve to suggest hemodynamically significant aortic stenosis. Left atrium   isenlarged. The left ventricle was difficult to visualize by all   standard echocardiographic windows. Possibly the end-diastolic diameter   is mildly increased. The wall thickness is borderline increased. Any   significant focal wall motion abnormality cannot be ascertained by this   study. Visually estimated ejection fraction 50-55%. The mitral annulus is   calcified. The mitral valve leaflets are mildly thickened with a normal   EF slope and excursion. There is no evidence for hemodynamically   significant mitral stenosis. On the very limited color flow Doppler   portion examination mild mitral regurgitation suggested.    Conclusion:  1. Technically difficult limited supine study. Please note that this is a   portable study and the study is also limited due to patient's body   habitus.  2. 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  3. Enlarged left atrium with associated mild mitral regurgitation.  4. Very limited visualization left ventricle which may represent mild   left ventricular concentric hypertrophy with a well-preserved ejection   fraction as described above.  5. A very small posterior pericardial effusion is suggested but not   diagnostic.  6. Correlate these limited findings clinically.    SALVADOR PHILLIPS M.D., ATTENDING CARDIOLOGIST  This document has been electronically signed. May  5 2018  9:47AM      < end of copied text >    < from: CT Chest w/ IV Cont (01.02.19 @ 15:04) >    EXAM:  CT ABDOMEN AND PELVIS IC                          EXAM:  CT CHEST IC                          PROCEDURE DATE:  01/02/2019      INTERPRETATION:  .    CLINICAL INFORMATION: Lower chest pain.    TECHNIQUE: Helical axial images were obtained from the thoracic inlet to   the pubic symphysis. 95 mls of Omnipaque-350 was administered   intravenously without complication and 5 mls were discarded. Oral   contrast was not administered. Sagittal and coronal reconstructed images   were obtained from the source data.    COMPARISON: Most recent prior CT examination of the abdomen and pelvis   from 11/26/2018. Prior MRI examination of the abdomen from 11/27/2018.   Prior chest, abdomen, and pelvis CT examination from 10/29/2018.    FINDINGS: Scattered subcentimeter sized lymph nodes are noted within the   axillary regions, mediastinum, and hilar areas.    The heart size is enlarged. The pericardium appears unremarkable. There   is a right sided dialysis catheter with its tip at the right atrium. A   left-sided IJ central line is noted with the tip at the SVC. No filling   defects are notable within the pulmonary arterial vessels. There are   atherosclerotic calcifications of the imaged coronary arteries, aorta,   and branch vessels. The imaged portions of the aorta are normal in   caliber.    The central airways are patent. Patchy groundglass opacities are notable   throughout both lungs with resultant mosaic attenuation. Scattered areas   of linear atelectasis are notable within the bilateral lung bases, left   upper lobe, and lingula.    There is redemonstration of nonspecific gallbladder wall thickening   and/or edema and/or fat deposition. Sludge and/or stones are noted within   the lumen of the gallbladder. A cholecystostomy tube tract is notable   along the right upper anterior abdominal wall. A small amount of   pneumobilia is noted, compatible with stent patency. The common bile duct   remains dilated and measures up to 1.2 cm. A common bile duct stent is   again noted. No radiopaque stones are notable adjacent to the stent.   Previously noted abscesses within the liver appear less conspicuous   compared to the prior MRI and CT examinations. A small low-attenuation   focus is noted within the central hepatic area measuring up to 8 mm   (series 2, image 114).     The pancreas, spleen, and adrenal glands appear unremarkable.    There is atrophy of the bilateral kidneys. A left-sided renal cyst   appears unchanged. There is minimal nonspecific bilateral perinephric   thickening. There is no hydronephrosis bilaterally. Arterial vascular   calcifications are noted in the vicinity of the bilateral renal   collecting systems. The urinary bladder appears unremarkable.    There are multiple scattered nonspecificsubcentimeter retroperitoneal   and mesenteric lymph nodes.    There is no bowel obstruction or abdominal ascites. A duodenal   diverticulum appears unchanged. Gas and stool are notable throughout the   large bowel loops. The appendix is normal.    The patient is status post hysterectomy. No adnexal masses are noted.    Multilevel degenerative changes notable within the imaged spine. There is   a moderate compression deformity of the L2 vertebral body which appears   unchanged. There is also a mild superior endplate deformity of the T12   vertebral body which also appears unchanged.    Areas of mixed lucency and sclerosis adjacent to the bilateral SI joints   appear unchanged. Mild diffuse osteosclerosis may be compatible with   early renal osteodystrophy.    IMPRESSION:    CT chest:  1. Groundglass mosaic attenuation to the lungs which may reflect small   airways or small vessel disease.    CT abdomen and pelvis:  1. Chronic cholecystitis, as seen on prior exams. A superimposed acute   component cannot be completely excluded.  2. Redemonstration of a common bile duct stent with pneumobilia,   compatible with stent patency.  3. Interval decreased conspicuity of previous noted abscesses throughout   the liver with a small abscess remaining, as discussed.  4. Other chronic nonemergent findings, as discussed.    CARLITO BEE M.D., ATTENDING RADIOLOGIST  This document has been electronically signed. Jan 2 2019  3:30PM      < end of copied text >                  Ab Linda ANP   Cardiology

## 2019-01-10 NOTE — PROGRESS NOTE ADULT - PROBLEM SELECTOR PLAN 2
Suspect acute on chronic per CT and symptoms on admission --- HIDA r/out acute kevin at the time of the scan so perhaps pt had obstruction that cleared by time of HIDA  - Continue IV Rocephin and Flagyl as per ID.  - Per Gen Surg (Dr. Campos), pt will benefit from lap kevin but when better surgically optimized.   - Per GI (Dr. Camacho), CBD stent still in place and will need to be removed after lap kevin. Suspect acute on chronic per CT and symptoms on admission --- HIDA r/out acute kevin at the time of the scan so perhaps pt had obstruction that cleared by time of HIDA  - Continue IV Rocephin and Flagyl as per ID. -- then switch to po cipro/flagyl tomorrow  - Per Gen Surg (Dr. Campos), pt will benefit from lap kevin but when better surgically optimized.   - Per GI (Dr. Camacho), CBD stent still in place and will need to be removed after lap kevin.

## 2019-01-10 NOTE — PROGRESS NOTE ADULT - ASSESSMENT
67 yo F with PMH of ESRD on HD (M, W, F) via permacath started in June 2018, CAD s/p 1 stent 2007, DM type 2, HTN, MI, biliary stent, Liver abscess, obesity who presents to the ED with abdominal pain x2 days, 4 episodes of vomiting. Currently admitted for sepsis 2/2 acute on chronic cholecystitis vs failed treatment of liver abscess. Patient had recent admission at [facility] November 2018 for acute cholecystitis causing sepsis, S/P Perc Kevin 10/30 and ESBL E. coli bacteremia. She was treated with IV ertapenem and developed A fIb with RVR. ERCP was attempted at [facility] but was unsuccessful and was transferred to [facility] where ERCP was performed with stone extraction and stent placement. She also had another admission in November 2018 at [place] shortly after for acute pancreatitis. Patient has been in rehab s/p last discharge from [facility].     - No clear evidence of acute ischemia.  Monitor closely for the development of anginal symptoms or clinical signs of ischemia as patient has history of CAD s/p stent  - Continue asa, Statin, Imdur   -At this point it would be ok not to resume plavix    - HIDA scan negative.  Per Surgery, no urgent need for kevin.  Will fu as outpt   - GI f/u for diarrhea    - No acute changes on EKG compared to previous.  - Pt BP elevated would increase procardia xl for better control   - Continue metoprolol  - Monitor and replete lytes, keep K>4, Mg>2.    - Other cardiovascular workup will depend on clinical course.  - All other workup per primary team.  - Will continue to follow.      Ab Linda [facility]  Cardiology

## 2019-01-10 NOTE — PROGRESS NOTE ADULT - PROBLEM SELECTOR PLAN 3
resolving  gi pcr and cdiff neg  cont bacid  cont questran, pepto prn  monitor stool frequency/consistency

## 2019-01-10 NOTE — PROGRESS NOTE ADULT - PROBLEM SELECTOR PLAN 3
Pt has PICC in place and 6-week course of Invanz-->Tigecycline as outpt  - Abscess seems to be getting smaller on CT.  - Continue to treat Klebsiella bacteremia with Rocephin/Flagyl. Pt has PICC in place and 6-week course of Invanz-->Tigecycline as outpt  - Abscess seems to be getting smaller on CT.  - Continue to treat Klebsiella bacteremia as above

## 2019-01-10 NOTE — PROGRESS NOTE ADULT - SUBJECTIVE AND OBJECTIVE BOX
Patient is a 68y old  Female who presents with a chief complaint of abd pain, vomiting (03 Jan 2019 12:08)      Patient seen in follow up for ESRD on HD. + Klebsiella bacteremia    PAST MEDICAL HISTORY:  Cholecystitis with cholangitis  Acute urinary retention  Liver abscess  ESRD (end stage renal disease) on dialysis  CAD (coronary artery disease)  Diabetes  CRF (chronic renal failure)  Hypertension  Pneumonia  Myocardial infarction  Hypertension  Diabetes    MEDICATIONS  (STANDING):  aspirin enteric coated 81 milliGRAM(s) Oral daily  atorvastatin 80 milliGRAM(s) Oral at bedtime  cholestyramine Powder (Sugar-Free) 4 Gram(s) Oral two times a day  ciprofloxacin     Tablet 250 milliGRAM(s) Oral at bedtime  dextrose 5%. 1000 milliLiter(s) (50 mL/Hr) IV Continuous <Continuous>  dextrose 50% Injectable 12.5 Gram(s) IV Push once  dextrose 50% Injectable 25 Gram(s) IV Push once  dextrose 50% Injectable 25 Gram(s) IV Push once  epoetin analilia Injectable 49778 Unit(s) IV Push <User Schedule>  gabapentin 300 milliGRAM(s) Oral two times a day  heparin  Injectable 5000 Unit(s) SubCutaneous every 8 hours  insulin lispro (HumaLOG) corrective regimen sliding scale   SubCutaneous three times a day before meals  insulin lispro (HumaLOG) corrective regimen sliding scale   SubCutaneous at bedtime  isosorbide   mononitrate ER Tablet (IMDUR) 30 milliGRAM(s) Oral at bedtime  isosorbide   mononitrate ER Tablet (IMDUR) 60 milliGRAM(s) Oral daily  lactobacillus acidophilus 1 Tablet(s) Oral three times a day with meals  lidocaine   Patch 1 Patch Transdermal daily  metoprolol tartrate 50 milliGRAM(s) Oral two times a day  metroNIDAZOLE    Tablet 500 milliGRAM(s) Oral every 8 hours  NIFEdipine XL 60 milliGRAM(s) Oral daily  pantoprazole    Tablet 40 milliGRAM(s) Oral before breakfast    MEDICATIONS  (PRN):  bismuth subsalicylate Liquid 30 milliLiter(s) Oral three times a day PRN Diarrhea  dextrose 40% Gel 15 Gram(s) Oral once PRN Blood Glucose LESS THAN 70 milliGRAM(s)/deciliter  glucagon  Injectable 1 milliGRAM(s) IntraMuscular once PRN Glucose LESS THAN 70 milligrams/deciliter    T(C): 36.7 (01-10-19 @ 09:35), Max: 37.7 (01-09-19 @ 19:37)  HR: 75 (01-10-19 @ 09:35) (67 - 84)  BP: 150/67 (01-10-19 @ 09:35) (124/70 - 160/91)  RR: 18 (01-10-19 @ 09:35)  SpO2: 98% (01-10-19 @ 09:35)  Wt(kg): --  I&O's Detail    09 Jan 2019 07:01  -  10 Wei 2019 07:00  --------------------------------------------------------  IN:    IV PiggyBack: 200 mL    Oral Fluid: 240 mL  Total IN: 440 mL    OUT:    Voided: 200 mL  Total OUT: 200 mL    Total NET: 240 mL      10 Wei 2019 07:01  -  10 Wei 2019 14:12  --------------------------------------------------------  IN:  Total IN: 0 mL    OUT:    Voided: 100 mL  Total OUT: 100 mL    Total NET: -100 mL          PHYSICAL EXAM:  General: NAD  Respiratory: b/l air entry  Cardiovascular: S1 S2  Gastrointestinal: soft  Extremities:  edema               LABORATORY:                        9.2    10.90 )-----------( 310      ( 10 Wei 2019 07:20 )             30.1     01-10    138  |  102  |  16  ----------------------------<  94  3.6   |  29  |  4.40<H>    Ca    7.4<L>      10 Wei 2019 07:20    TPro  6.1  /  Alb  1.1<L>  /  TBili  0.4  /  DBili  x   /  AST  106<H>  /  ALT  38  /  AlkPhos  228<H>  01-10    Sodium, Serum: 138 mmol/L (01-10 @ 07:20)  Sodium, Serum: 139 mmol/L (01-09 @ 06:31)    Potassium, Serum: 3.6 mmol/L (01-10 @ 07:20)  Potassium, Serum: 3.5 mmol/L (01-09 @ 06:31)    Hemoglobin: 9.2 g/dL (01-10 @ 07:20)  Hemoglobin: 9.9 g/dL (01-09 @ 06:31)  Hemoglobin: 10.3 g/dL (01-08 @ 06:22)    Creatinine, Serum 4.40 (01-10 @ 07:20)  Creatinine, Serum 3.40 (01-09 @ 06:31)  Creatinine, Serum 5.60 (01-08 @ 06:22)        LIVER FUNCTIONS - ( 10 Wei 2019 07:20 )  Alb: 1.1 g/dL / Pro: 6.1 g/dL / ALK PHOS: 228 U/L / ALT: 38 U/L / AST: 106 U/L / GGT: x

## 2019-01-11 LAB
ANION GAP SERPL CALC-SCNC: 7 MMOL/L — SIGNIFICANT CHANGE UP (ref 5–17)
BUN SERPL-MCNC: 9 MG/DL — SIGNIFICANT CHANGE UP (ref 7–23)
CALCIUM SERPL-MCNC: 7.8 MG/DL — LOW (ref 8.5–10.1)
CHLORIDE SERPL-SCNC: 103 MMOL/L — SIGNIFICANT CHANGE UP (ref 96–108)
CO2 SERPL-SCNC: 30 MMOL/L — SIGNIFICANT CHANGE UP (ref 22–31)
CREAT SERPL-MCNC: 3.1 MG/DL — HIGH (ref 0.5–1.3)
GLUCOSE SERPL-MCNC: 146 MG/DL — HIGH (ref 70–99)
HCT VFR BLD CALC: 33.4 % — LOW (ref 34.5–45)
HGB BLD-MCNC: 10.1 G/DL — LOW (ref 11.5–15.5)
MCHC RBC-ENTMCNC: 28.7 PG — SIGNIFICANT CHANGE UP (ref 27–34)
MCHC RBC-ENTMCNC: 30.2 GM/DL — LOW (ref 32–36)
MCV RBC AUTO: 94.9 FL — SIGNIFICANT CHANGE UP (ref 80–100)
NRBC # BLD: 0 /100 WBCS — SIGNIFICANT CHANGE UP (ref 0–0)
PLATELET # BLD AUTO: 363 K/UL — SIGNIFICANT CHANGE UP (ref 150–400)
POTASSIUM SERPL-MCNC: 3.4 MMOL/L — LOW (ref 3.5–5.3)
POTASSIUM SERPL-SCNC: 3.4 MMOL/L — LOW (ref 3.5–5.3)
RBC # BLD: 3.52 M/UL — LOW (ref 3.8–5.2)
RBC # FLD: 20.3 % — HIGH (ref 10.3–14.5)
SODIUM SERPL-SCNC: 140 MMOL/L — SIGNIFICANT CHANGE UP (ref 135–145)
WBC # BLD: 12.58 K/UL — HIGH (ref 3.8–10.5)
WBC # FLD AUTO: 12.58 K/UL — HIGH (ref 3.8–10.5)

## 2019-01-11 PROCEDURE — 99233 SBSQ HOSP IP/OBS HIGH 50: CPT | Mod: GC

## 2019-01-11 PROCEDURE — 99232 SBSQ HOSP IP/OBS MODERATE 35: CPT

## 2019-01-11 RX ORDER — POTASSIUM CHLORIDE 20 MEQ
40 PACKET (EA) ORAL ONCE
Qty: 0 | Refills: 0 | Status: COMPLETED | OUTPATIENT
Start: 2019-01-11 | End: 2019-01-11

## 2019-01-11 RX ORDER — LOPERAMIDE HCL 2 MG
2 TABLET ORAL ONCE
Qty: 0 | Refills: 0 | Status: COMPLETED | OUTPATIENT
Start: 2019-01-11 | End: 2019-01-11

## 2019-01-11 RX ADMIN — Medication 1: at 08:55

## 2019-01-11 RX ADMIN — Medication 500 MILLIGRAM(S): at 21:30

## 2019-01-11 RX ADMIN — LIDOCAINE 1 PATCH: 4 CREAM TOPICAL at 23:32

## 2019-01-11 RX ADMIN — PANTOPRAZOLE SODIUM 40 MILLIGRAM(S): 20 TABLET, DELAYED RELEASE ORAL at 05:03

## 2019-01-11 RX ADMIN — Medication 500 MILLIGRAM(S): at 05:03

## 2019-01-11 RX ADMIN — Medication 1: at 17:26

## 2019-01-11 RX ADMIN — Medication 2 MILLIGRAM(S): at 16:36

## 2019-01-11 RX ADMIN — Medication 250 MILLIGRAM(S): at 21:30

## 2019-01-11 RX ADMIN — LIDOCAINE 1 PATCH: 4 CREAM TOPICAL at 12:45

## 2019-01-11 RX ADMIN — Medication 60 MILLIGRAM(S): at 05:03

## 2019-01-11 RX ADMIN — Medication 500 MILLIGRAM(S): at 16:37

## 2019-01-11 RX ADMIN — Medication 50 MILLIGRAM(S): at 17:24

## 2019-01-11 RX ADMIN — Medication 1: at 12:46

## 2019-01-11 RX ADMIN — Medication 81 MILLIGRAM(S): at 12:46

## 2019-01-11 RX ADMIN — Medication 1 TABLET(S): at 09:19

## 2019-01-11 RX ADMIN — Medication 1 TABLET(S): at 16:37

## 2019-01-11 RX ADMIN — HEPARIN SODIUM 5000 UNIT(S): 5000 INJECTION INTRAVENOUS; SUBCUTANEOUS at 21:30

## 2019-01-11 RX ADMIN — HEPARIN SODIUM 5000 UNIT(S): 5000 INJECTION INTRAVENOUS; SUBCUTANEOUS at 16:37

## 2019-01-11 RX ADMIN — Medication 50 MILLIGRAM(S): at 05:03

## 2019-01-11 RX ADMIN — Medication 1 TABLET(S): at 12:46

## 2019-01-11 RX ADMIN — Medication 40 MILLIEQUIVALENT(S): at 10:40

## 2019-01-11 RX ADMIN — ISOSORBIDE MONONITRATE 30 MILLIGRAM(S): 60 TABLET, EXTENDED RELEASE ORAL at 21:30

## 2019-01-11 RX ADMIN — CHOLESTYRAMINE 4 GRAM(S): 4 POWDER, FOR SUSPENSION ORAL at 09:19

## 2019-01-11 RX ADMIN — ATORVASTATIN CALCIUM 80 MILLIGRAM(S): 80 TABLET, FILM COATED ORAL at 21:30

## 2019-01-11 RX ADMIN — CHOLESTYRAMINE 4 GRAM(S): 4 POWDER, FOR SUSPENSION ORAL at 21:30

## 2019-01-11 RX ADMIN — HEPARIN SODIUM 5000 UNIT(S): 5000 INJECTION INTRAVENOUS; SUBCUTANEOUS at 05:02

## 2019-01-11 RX ADMIN — GABAPENTIN 300 MILLIGRAM(S): 400 CAPSULE ORAL at 21:30

## 2019-01-11 NOTE — PROGRESS NOTE ADULT - SUBJECTIVE AND OBJECTIVE BOX
[facility], Division of Infectious Diseases  DEB Barbosa A. Lee  [phone]    Name: IRENE TAYLOR  Age: 69y  Gender: Female  MRN: [id number]    Interval History--  Notes reviewed. Granddaughter translating at patient request.   Still c/o loose stool.  Patient/family do not want rehab.    Past Medical History--  Cholecystitis with cholangitis  Acute urinary retention  Liver abscess  ESRD (end stage renal disease) on dialysis  CAD (coronary artery disease)  Diabetes  CRF (chronic renal failure)  Hypertension  Pneumonia  Myocardial infarction  Hypertension  Diabetes  H/O: hysterectomy      For details regarding the patient's social history, family history, and other miscellaneous elements, please refer the initial infectious diseases consultation and/or the admitting history and physical examination for this admission.    Allergies    ertapenem (Urticaria)  Purell (Rash)    Intolerances        Medications--  Antibiotics:  ciprofloxacin     Tablet 250 milliGRAM(s) Oral at bedtime  metroNIDAZOLE    Tablet 500 milliGRAM(s) Oral every 8 hours    Immunologic:  epoetin analilia Injectable 74351 Unit(s) IV Push <User Schedule>    Other:  aspirin enteric coated  atorvastatin  bismuth subsalicylate Liquid PRN  cholestyramine Powder (Sugar-Free)  dextrose 40% Gel PRN  dextrose 5%.  dextrose 50% Injectable  dextrose 50% Injectable  dextrose 50% Injectable  gabapentin  glucagon  Injectable PRN  heparin  Injectable  insulin lispro (HumaLOG) corrective regimen sliding scale  insulin lispro (HumaLOG) corrective regimen sliding scale  isosorbide   mononitrate ER Tablet (IMDUR)  isosorbide   mononitrate ER Tablet (IMDUR)  lactobacillus acidophilus  lidocaine   Patch  metoprolol tartrate  NIFEdipine XL  pantoprazole    Tablet      Review of Systems--  A 10-point review of systems was obtained.   Review of systems otherwise negative except as previously noted.    Physical Examination--  Vital Signs: T(F): 99.1 (01-11-19 @ 08:06), Max: 99.1 (01-11-19 @ 08:06)  HR: 80 (01-11-19 @ 08:06)  BP: 128/72 (01-11-19 @ 08:06)  RR: 18 (01-11-19 @ 08:06)  SpO2: 100% (01-11-19 @ 08:06)  Wt(kg): --  HEENT: AT/NC. Anicteric. Conjunctiva pink and moist. Oropharynx clear.  Neck: Not rigid. No sense of mass.  Nodes: None palpable.  Lungs: Diminished breath sounds bilaterally without rales, wheezing or rhonchi  Heart: Regular rate and rhythm. No Murmur. No rub. No gallop. No palpable thrill.  Abdomen: Bowel sounds present and normoactive. Soft. Nondistended. No tenderness whatsoever today. No sense of mass. No organomegaly.  Extremities: No cyanosis or clubbing. 1+ edema.   Skin: Warm. Dry. Good turgor. No vasculitic stigmata.  Psychiatric: Appropriate affect and mood for situation.       Laboratory Studies--  CBC                        10.1   12.58 )-----------( 363      ( 11 Jan 2019 06:49 )             33.4       Chemistries  01-11    140  |  103  |  9   ----------------------------<  146<H>  3.4<L>   |  30  |  3.10<H>    Ca    7.8<L>      11 Jan 2019 06:49    TPro  6.1  /  Alb  1.1<L>  /  TBili  0.4  /  DBili  x   /  AST  106<H>  /  ALT  38  /  AlkPhos  228<H>  01-10      Culture Data    GI PCR Panel, Stool (collected 08 Jan 2019 21:40)  Source: .Stool Feces  Final Report (08 Jan 2019 23:57):    GI PCR Results: NOT detected    *******Please Note:*******    GI panel PCR evaluates for:    Campylobacter, Plesiomonas shigelloides, Salmonella,    Vibrio, Yersinia enterocolitica, Enteroaggregative    Escherichia coli (EAEC), Enteropathogenic E.coli (EPEC),    Enterotoxigenic E. coli (ETEC) lt/st, Shiga-like    toxin-producing E. coli (STEC) stx1/stx2,    Shigella/ Enteroinvasive E. coli (EIEC), Cryptosporidium,    Cyclospora cayetanensis, Entamoeba histolytica,    Giardia lamblia, Adenovirus F 40/41, Astrovirus,    Norovirus GI/GII, Rotavirus A, Sapovirus

## 2019-01-11 NOTE — CHART NOTE - NSCHARTNOTEFT_GEN_A_CORE
Assessment: Pt seen for nutrition follow up for LOS. Chart reviewed, hospital course noted. Per chart, pt is 69 Y/O F with PMH of ESRD on HD (M, W, F), CAD S/P stents 2007, Type 2 DM, HTN, MI, recent admission with ascending cholangitis with ESBL E coli bacteremia S/P biliary stent, recent admission with liver abscesses, who presents to the ED with abdominal pain x2 days, 4 episodes of vomiting, admitted for sepsis due to suspected acute on chronic cholecystitis found to have Klebsiella bacteremia. Awaiting lap kevin when medically optimized. Roberto speaking, no family at bedside.     Per RN/CNA, pt with persistent suboptimal po intake (25-50%). Observed with bottles of unopened Nepro at bedside. Pt with diarrhea, resolving per GI. +BM today 1/11. No difficulties chewing/swallowing noted.      Factors impacting intake: [ ] none [ ] nausea  [ ] vomiting [X] diarrhea [ ] constipation  [ ]chewing problems [ ] swallowing issues  [ ] other:     Diet, Consistent Carbohydrate Renal/No Snacks:   Supplement Feeding Modality:  Oral  Nepro Cans or Servings Per Day:  1       Frequency:  Three Times a day (01-07-19 @ 18:56)    Intake: 25-50%    Current Weight: 181.4 pounds (1/11)    Pertinent Medications: MEDICATIONS  (STANDING):  aspirin enteric coated 81 milliGRAM(s) Oral daily  atorvastatin 80 milliGRAM(s) Oral at bedtime  cholestyramine Powder (Sugar-Free) 4 Gram(s) Oral two times a day  ciprofloxacin     Tablet 250 milliGRAM(s) Oral at bedtime  dextrose 5%. 1000 milliLiter(s) (50 mL/Hr) IV Continuous <Continuous>  dextrose 50% Injectable 12.5 Gram(s) IV Push once  dextrose 50% Injectable 25 Gram(s) IV Push once  dextrose 50% Injectable 25 Gram(s) IV Push once  epoetin analilia Injectable 01031 Unit(s) IV Push <User Schedule>  gabapentin 300 milliGRAM(s) Oral at bedtime  heparin  Injectable 5000 Unit(s) SubCutaneous every 8 hours  insulin lispro (HumaLOG) corrective regimen sliding scale   SubCutaneous three times a day before meals  insulin lispro (HumaLOG) corrective regimen sliding scale   SubCutaneous at bedtime  isosorbide   mononitrate ER Tablet (IMDUR) 30 milliGRAM(s) Oral at bedtime  isosorbide   mononitrate ER Tablet (IMDUR) 60 milliGRAM(s) Oral daily  lactobacillus acidophilus 1 Tablet(s) Oral three times a day with meals  lidocaine   Patch 1 Patch Transdermal daily  metoprolol tartrate 50 milliGRAM(s) Oral two times a day  metroNIDAZOLE    Tablet 500 milliGRAM(s) Oral every 8 hours  NIFEdipine XL 60 milliGRAM(s) Oral daily  pantoprazole    Tablet 40 milliGRAM(s) Oral before breakfast    MEDICATIONS  (PRN):  bismuth subsalicylate Liquid 30 milliLiter(s) Oral three times a day PRN Diarrhea  dextrose 40% Gel 15 Gram(s) Oral once PRN Blood Glucose LESS THAN 70 milliGRAM(s)/deciliter  glucagon  Injectable 1 milliGRAM(s) IntraMuscular once PRN Glucose LESS THAN 70 milligrams/deciliter    Pertinent Labs: 01-11 Na140 mmol/L Glu 146 mg/dL<H> K+ 3.4 mmol/L<L> Cr  3.10 mg/dL<H> BUN 9 mg/dL 01-07 Phos 2.1 mg/dL<L> 01-10 Alb 1.1 g/dL<L> 01-03 [id number] 6.0 %<H>     CAPILLARY BLOOD GLUCOSE      POCT Blood Glucose.: 174 mg/dL (11 Jan 2019 07:57)  POCT Blood Glucose.: 132 mg/dL (10 Wei 2019 22:29)  POCT Blood Glucose.: 159 mg/dL (10 Wei 2019 17:00)  POCT Blood Glucose.: 106 mg/dL (10 Wei 2019 11:27)    Skin: +1 generalized edema, no pressure injuries noted    Estimated Needs:   [X] no change since previous assessment  [ ] recalculated:     Previous Nutrition Diagnosis:   [X] Altered GI Function     Nutrition Diagnosis is [X] ongoing  [ ] resolved [ ] not applicable     New Nutrition Diagnosis: [ ] Inadequate oral intake related to persistent lack of appetite      Interventions:   Recommend  [X] Change Diet To: Liberalize diet to consistent carbohydrate, add snack. D/C renal restrictions in light of poor po intake and renal indices (K, Phos) WNL.   [X] Nutrition Supplement: Nepro TID as ordered  [ ] Nutrition Support  [X] Other: Replete K, re-check Phosphorus levels. Encourage po intake. RD to provide food preferences within therapeutic restrictions.    Monitoring and Evaluation:   [X] PO intake [ x ] Tolerance to diet prescription [ x ] weights [ x ] labs[ x ] follow up per protocol  [X] other: RD to remain available. Assessment: Pt seen for nutrition follow up for LOS. Chart reviewed, hospital course noted. Per chart, pt is 67 Y/O F with PMH of ESRD on HD (M, W, F), CAD S/P stents 2007, Type 2 DM, HTN, MI, recent admission with ascending cholangitis with ESBL E coli bacteremia S/P biliary stent, recent admission with liver abscesses, who presents to the ED with abdominal pain x2 days, 4 episodes of vomiting, admitted for sepsis due to suspected acute on chronic cholecystitis found to have Klebsiella bacteremia. Awaiting lap kevin when medically optimized. Roberto speaking, no family at bedside. Resident Pantera provided Roberto translation.     Per RN/CNA, pt with persistent suboptimal po intake (25-50%). Observed with bottles of unopened Nepro at bedside. Pt with diarrhea, resolving per GI. +BM today 1/11. No difficulties chewing/swallowing noted. States that she has not been consuming much because she has had persistent loose BMs. Discussed stool thickening recommendations - toast, bananas, applesauce.       Factors impacting intake: [ ] none [ ] nausea  [ ] vomiting [X] diarrhea [ ] constipation  [ ]chewing problems [ ] swallowing issues  [ ] other:     Diet, Consistent Carbohydrate Renal/No Snacks:   Supplement Feeding Modality:  Oral  Nepro Cans or Servings Per Day:  1       Frequency:  Three Times a day (01-07-19 @ 18:56)    Intake: 25-50%    Current Weight: 181.4 pounds (1/11)    Pertinent Medications: MEDICATIONS  (STANDING):  aspirin enteric coated 81 milliGRAM(s) Oral daily  atorvastatin 80 milliGRAM(s) Oral at bedtime  cholestyramine Powder (Sugar-Free) 4 Gram(s) Oral two times a day  ciprofloxacin     Tablet 250 milliGRAM(s) Oral at bedtime  dextrose 5%. 1000 milliLiter(s) (50 mL/Hr) IV Continuous <Continuous>  dextrose 50% Injectable 12.5 Gram(s) IV Push once  dextrose 50% Injectable 25 Gram(s) IV Push once  dextrose 50% Injectable 25 Gram(s) IV Push once  epoetin analilia Injectable 70899 Unit(s) IV Push <User Schedule>  gabapentin 300 milliGRAM(s) Oral at bedtime  heparin  Injectable 5000 Unit(s) SubCutaneous every 8 hours  insulin lispro (HumaLOG) corrective regimen sliding scale   SubCutaneous three times a day before meals  insulin lispro (HumaLOG) corrective regimen sliding scale   SubCutaneous at bedtime  isosorbide   mononitrate ER Tablet (IMDUR) 30 milliGRAM(s) Oral at bedtime  isosorbide   mononitrate ER Tablet (IMDUR) 60 milliGRAM(s) Oral daily  lactobacillus acidophilus 1 Tablet(s) Oral three times a day with meals  lidocaine   Patch 1 Patch Transdermal daily  metoprolol tartrate 50 milliGRAM(s) Oral two times a day  metroNIDAZOLE    Tablet 500 milliGRAM(s) Oral every 8 hours  NIFEdipine XL 60 milliGRAM(s) Oral daily  pantoprazole    Tablet 40 milliGRAM(s) Oral before breakfast    MEDICATIONS  (PRN):  bismuth subsalicylate Liquid 30 milliLiter(s) Oral three times a day PRN Diarrhea  dextrose 40% Gel 15 Gram(s) Oral once PRN Blood Glucose LESS THAN 70 milliGRAM(s)/deciliter  glucagon  Injectable 1 milliGRAM(s) IntraMuscular once PRN Glucose LESS THAN 70 milligrams/deciliter    Pertinent Labs: 01-11 Na140 mmol/L Glu 146 mg/dL<H> K+ 3.4 mmol/L<L> Cr  3.10 mg/dL<H> BUN 9 mg/dL 01-07 Phos 2.1 mg/dL<L> 01-10 Alb 1.1 g/dL<L> 01-03 [id number] 6.0 %<H>     CAPILLARY BLOOD GLUCOSE      POCT Blood Glucose.: 174 mg/dL (11 Jan 2019 07:57)  POCT Blood Glucose.: 132 mg/dL (10 Wei 2019 22:29)  POCT Blood Glucose.: 159 mg/dL (10 Wei 2019 17:00)  POCT Blood Glucose.: 106 mg/dL (10 Wei 2019 11:27)    Skin: +1 generalized edema, no pressure injuries noted    Estimated Needs:   [X] no change since previous assessment  [ ] recalculated:     Previous Nutrition Diagnosis:   [X] Altered GI Function     Nutrition Diagnosis is [X] ongoing  [ ] resolved [ ] not applicable     New Nutrition Diagnosis: [ ] Inadequate oral intake related to persistent lack of appetite      Interventions:   Recommend  [X] Change Diet To: Liberalize diet to consistent carbohydrate, add snack. D/C renal restrictions in light of poor po intake and renal indices (K, Phos) WNL. Discussed with resident and pending verification placed.  [X] Nutrition Supplement: Change to Nepro 1x daily per pt's request.  [ ] Nutrition Support  [X] Other: Replete lytes prn, re-check Phosphorus levels. Encourage po intake of small frequent meals as tolerated. RD to provide food preferences within therapeutic restrictions (oatmeal, bananas).    Monitoring and Evaluation:   [X] PO intake [ x ] Tolerance to diet prescription [ x ] weights [ x ] labs[ x ] follow up per protocol  [X] other: RD to remain available.

## 2019-01-11 NOTE — PROGRESS NOTE ADULT - ASSESSMENT
69 yo F with PMH of ESRD on HD (M, W, F), CAD s/p stents 2007, DM, HTN, MI, biliary stent, Liver abscess who presents to the ED with abdominal pain x2 days, 4 episodes of vomiting, leukocytosis and Klebsiella bacteremia  Source not defined but my concern remains that it is related to the gallbaldder.  Patient's exam now benign  Question whether she may be intermittently obstructing her cystic duct, causing episodes of pain.  She is clearly at risk for complications related to surgery in her current state  Stool studies negative for infectious process.  Could well just be related to antibiotics in/of themselves  Concern on my part remains that she will have recurrent infection until the GB is removed.

## 2019-01-11 NOTE — PROGRESS NOTE ADULT - SUBJECTIVE AND OBJECTIVE BOX
pt seen  no complaints  ICU Vital Signs Last 24 Hrs  T(C): 37.3 (11 Jan 2019 08:06), Max: 37.3 (11 Jan 2019 08:06)  T(F): 99.1 (11 Jan 2019 08:06), Max: 99.1 (11 Jan 2019 08:06)  HR: 80 (11 Jan 2019 08:06) (74 - 82)  BP: 128/72 (11 Jan 2019 08:06) (122/67 - 161/78)  BP(mean): --  ABP: --  ABP(mean): --  RR: 18 (11 Jan 2019 08:06) (16 - 20)  SpO2: 100% (11 Jan 2019 08:06) (95% - 100%)  gen-NAD  respclear  abd-soft NT/ND                          10.1   12.58 )-----------( 363      ( 11 Jan 2019 06:49 )             33.4

## 2019-01-11 NOTE — PROGRESS NOTE ADULT - PROBLEM SELECTOR PLAN 5
s/p stent 2007  - Continue home dose atorvastatin, metoprolol (with hold parameters) and aspirin.  - Had been holding Plavix for possible lap kevin -- check with cardio if need to be on DAPT given how old stent was. If cardio wishes to restart, will restart. s/p stent 2007  - Continue home dose atorvastatin, metoprolol (with hold parameters) and aspirin.  - Had been holding Plavix for possible lap kevin -- no need to restart as stent is >10 years old.

## 2019-01-11 NOTE — PROGRESS NOTE ADULT - SUBJECTIVE AND OBJECTIVE BOX
INTERVAL HPI/OVERNIGHT EVENTS:  pt seen and examined  denies n/v/abd pain  w one loose brown bm early this am per nursing, none overnight  afebrile overnight labs noted    MEDICATIONS  (STANDING):  aspirin enteric coated 81 milliGRAM(s) Oral daily  atorvastatin 80 milliGRAM(s) Oral at bedtime  cholestyramine Powder (Sugar-Free) 4 Gram(s) Oral two times a day  ciprofloxacin     Tablet 250 milliGRAM(s) Oral at bedtime  dextrose 5%. 1000 milliLiter(s) (50 mL/Hr) IV Continuous <Continuous>  dextrose 50% Injectable 12.5 Gram(s) IV Push once  dextrose 50% Injectable 25 Gram(s) IV Push once  dextrose 50% Injectable 25 Gram(s) IV Push once  epoetin analilia Injectable 51431 Unit(s) IV Push <User Schedule>  gabapentin 300 milliGRAM(s) Oral at bedtime  heparin  Injectable 5000 Unit(s) SubCutaneous every 8 hours  insulin lispro (HumaLOG) corrective regimen sliding scale   SubCutaneous three times a day before meals  insulin lispro (HumaLOG) corrective regimen sliding scale   SubCutaneous at bedtime  isosorbide   mononitrate ER Tablet (IMDUR) 30 milliGRAM(s) Oral at bedtime  isosorbide   mononitrate ER Tablet (IMDUR) 60 milliGRAM(s) Oral daily  lactobacillus acidophilus 1 Tablet(s) Oral three times a day with meals  lidocaine   Patch 1 Patch Transdermal daily  metoprolol tartrate 50 milliGRAM(s) Oral two times a day  metroNIDAZOLE    Tablet 500 milliGRAM(s) Oral every 8 hours  NIFEdipine XL 60 milliGRAM(s) Oral daily  pantoprazole    Tablet 40 milliGRAM(s) Oral before breakfast  potassium chloride    Tablet ER 40 milliEquivalent(s) Oral once    MEDICATIONS  (PRN):  bismuth subsalicylate Liquid 30 milliLiter(s) Oral three times a day PRN Diarrhea  dextrose 40% Gel 15 Gram(s) Oral once PRN Blood Glucose LESS THAN 70 milliGRAM(s)/deciliter  glucagon  Injectable 1 milliGRAM(s) IntraMuscular once PRN Glucose LESS THAN 70 milligrams/deciliter      Allergies    ertapenem (Urticaria)  Purell (Rash)    Intolerances        Review of Systems:    General:  No wt loss, fevers, chills, night sweats, fatigue   Eyes:  Good vision, no reported pain  ENT:  No sore throat, pain, runny nose, dysphagia  CV:  No pain, palpitations, hypo/hypertension  Resp:  No dyspnea, cough, tachypnea, wheezing  GI:  No pain, No nausea, No vomiting, No diarrhea, No constipation, No weight loss, No fever, No pruritis, No rectal bleeding, No melena, No dysphagia  :  No pain, bleeding, incontinence, nocturia  Muscle:  No pain, weakness  Neuro:  No weakness, tingling, memory problems  Psych:  No fatigue, insomnia, mood problems, depression  Endocrine:  No polyuria, polydypsia, cold/heat intolerance  Heme:  No petechiae, ecchymosis, easy bruisability  Skin:  No rash, tattoos, scars, edema      Vital Signs Last 24 Hrs  T(C): 37.3 (11 Jan 2019 08:06), Max: 37.3 (11 Jan 2019 08:06)  T(F): 99.1 (11 Jan 2019 08:06), Max: 99.1 (11 Jan 2019 08:06)  HR: 80 (11 Jan 2019 08:06) (74 - 82)  BP: 128/72 (11 Jan 2019 08:06) (122/67 - 161/78)  BP(mean): --  RR: 18 (11 Jan 2019 08:06) (16 - 20)  SpO2: 100% (11 Jan 2019 08:06) (95% - 100%)    PHYSICAL EXAM:    Constitutional: NAD  HEENT: ncat  Neck: No LAD  Respiratory: dec bs  Cardiovascular: S1 and S2, RRR  Gastrointestinal: obese soft nt nd  Extremities: trace edema  Vascular: 2+ peripheral pulses  Neurological: awake alert  Skin: No rashes    LABS:                        10.1   12.58 )-----------( 363      ( 11 Jan 2019 06:49 )             33.4     01-11    140  |  103  |  9   ----------------------------<  146<H>  3.4<L>   |  30  |  3.10<H>    Ca    7.8<L>      11 Jan 2019 06:49    TPro  6.1  /  Alb  1.1<L>  /  TBili  0.4  /  DBili  x   /  AST  106<H>  /  ALT  38  /  AlkPhos  228<H>  01-10          RADIOLOGY & ADDITIONAL TESTS:

## 2019-01-11 NOTE — PROGRESS NOTE ADULT - PROBLEM SELECTOR PLAN 3
Pt has PICC in place and 6-week course of Invanz-->Tigecycline as outpt  - Abscess seems to be getting smaller on CT.  - Continue to treat Klebsiella bacteremia as above

## 2019-01-11 NOTE — PROGRESS NOTE ADULT - SUBJECTIVE AND OBJECTIVE BOX
Patient is a 69y old  Female who presents with a chief complaint of abd pain, vomiting (11 Jan 2019 13:08)      INTERVAL HPI/OVERNIGHT EVENTS: Patient seen and examined at bedside. Reports R shoulder pain has resolved but she feels weak and has had limited her PO intake due to diarrhea which persists. Denies abdominal pain, N/V.    MEDICATIONS  (STANDING):  aspirin enteric coated 81 milliGRAM(s) Oral daily  atorvastatin 80 milliGRAM(s) Oral at bedtime  cholestyramine Powder (Sugar-Free) 4 Gram(s) Oral two times a day  ciprofloxacin     Tablet 250 milliGRAM(s) Oral at bedtime  dextrose 5%. 1000 milliLiter(s) (50 mL/Hr) IV Continuous <Continuous>  dextrose 50% Injectable 12.5 Gram(s) IV Push once  dextrose 50% Injectable 25 Gram(s) IV Push once  dextrose 50% Injectable 25 Gram(s) IV Push once  epoetin analilia Injectable 70999 Unit(s) IV Push <User Schedule>  gabapentin 300 milliGRAM(s) Oral at bedtime  heparin  Injectable 5000 Unit(s) SubCutaneous every 8 hours  insulin lispro (HumaLOG) corrective regimen sliding scale   SubCutaneous three times a day before meals  insulin lispro (HumaLOG) corrective regimen sliding scale   SubCutaneous at bedtime  isosorbide   mononitrate ER Tablet (IMDUR) 30 milliGRAM(s) Oral at bedtime  isosorbide   mononitrate ER Tablet (IMDUR) 60 milliGRAM(s) Oral daily  lactobacillus acidophilus 1 Tablet(s) Oral three times a day with meals  lidocaine   Patch 1 Patch Transdermal daily  metoprolol tartrate 50 milliGRAM(s) Oral two times a day  metroNIDAZOLE    Tablet 500 milliGRAM(s) Oral every 8 hours  NIFEdipine XL 60 milliGRAM(s) Oral daily  pantoprazole    Tablet 40 milliGRAM(s) Oral before breakfast    MEDICATIONS  (PRN):  bismuth subsalicylate Liquid 30 milliLiter(s) Oral three times a day PRN Diarrhea  dextrose 40% Gel 15 Gram(s) Oral once PRN Blood Glucose LESS THAN 70 milliGRAM(s)/deciliter  glucagon  Injectable 1 milliGRAM(s) IntraMuscular once PRN Glucose LESS THAN 70 milligrams/deciliter      Allergies    ertapenem (Urticaria)  Purell (Rash)    Intolerances        REVIEW OF SYSTEMS:  CONSTITUTIONAL: No fever or chills  HEENT: No headache, no sore throat  RESPIRATORY: No cough, wheezing, or shortness of breath  CARDIOVASCULAR: No chest pain, palpitations, or leg swelling  GASTROINTESTINAL: No abd pain, nausea, vomiting, +diarrhea  GENITOURINARY: No dysuria, frequency, or hematuria  NEUROLOGICAL: No focal weakness or dizziness  MUSCULOSKELETAL: No myalgias     Vital Signs Last 24 Hrs  T(C): 37.3 (11 Jan 2019 08:06), Max: 37.3 (11 Jan 2019 08:06)  T(F): 99.1 (11 Jan 2019 08:06), Max: 99.1 (11 Jan 2019 08:06)  HR: 80 (11 Jan 2019 08:06) (74 - 82)  BP: 128/72 (11 Jan 2019 08:06) (122/67 - 149/71)  BP(mean): --  RR: 18 (11 Jan 2019 08:06) (16 - 18)  SpO2: 100% (11 Jan 2019 08:06) (95% - 100%)    PHYSICAL EXAM:  GENERAL: NAD  HEENT: EOMI, moist mucous membranes  CHEST/LUNG: CTA b/l, no rales, wheezes, or rhonchi  HEART: RRR, S1, S2  ABDOMEN: BS+, soft, nontender, nondistended  EXTREMITIES: No edema, cyanosis, or calf tenderness  NERVOUS SYSTEM: Answering questions and following commands    LABS:                        10.1   12.58 )-----------( 363      ( 11 Jan 2019 06:49 )             33.4     CBC Full  -  ( 11 Jan 2019 06:49 )  WBC Count : 12.58 K/uL  Hemoglobin : 10.1 g/dL  Hematocrit : 33.4 %  Platelet Count - Automated : 363 K/uL  Mean Cell Volume : 94.9 fl  Mean Cell Hemoglobin : 28.7 pg  Mean Cell Hemoglobin Concentration : 30.2 gm/dL  Auto Neutrophil # : x  Auto Lymphocyte # : x  Auto Monocyte # : x  Auto Eosinophil # : x  Auto Basophil # : x  Auto Neutrophil % : x  Auto Lymphocyte % : x  Auto Monocyte % : x  Auto Eosinophil % : x  Auto Basophil % : x    11 Jan 2019 06:49    140    |  103    |  9      ----------------------------<  146    3.4     |  30     |  3.10     Ca    7.8        11 Jan 2019 06:49        CAPILLARY BLOOD GLUCOSE  POCT Blood Glucose.: 195 mg/dL (11 Jan 2019 12:12)  POCT Blood Glucose.: 174 mg/dL (11 Jan 2019 07:57)  POCT Blood Glucose.: 132 mg/dL (10 Wei 2019 22:29)  POCT Blood Glucose.: 159 mg/dL (10 Wei 2019 17:00)      GI PCR Panel, Stool (collected 01-08-19 @ 21:40)  Source: .Stool Feces  Final Report (01-08-19 @ 23:57):    GI PCR Results: NOT detected    *******Please Note:*******    GI panel PCR evaluates for:    Campylobacter, Plesiomonas shigelloides, Salmonella,    Vibrio, Yersinia enterocolitica, Enteroaggregative    Escherichia coli (EAEC), Enteropathogenic E.coli (EPEC),    Enterotoxigenic E. coli (ETEC) lt/st, Shiga-like    toxin-producing E. coli (STEC) stx1/stx2,    Shigella/ Enteroinvasive E. coli (EIEC), Cryptosporidium,    Cyclospora cayetanensis, Entamoeba histolytica,    Giardia lamblia, Adenovirus F 40/41, Astrovirus,    Norovirus GI/GII, Rotavirus A, Sapovirus        RADIOLOGY & ADDITIONAL TESTS:    Personally reviewed.     Consultant(s) Notes Reviewed:  [x] YES  [ ] NO Patient is a 69y old  Female who presents with a chief complaint of abd pain, vomiting (11 Jan 2019 13:08)      INTERVAL HPI/OVERNIGHT EVENTS: Patient seen and examined at bedside. Reports R shoulder pain has resolved but she feels weak and has had decreased her PO intake especially of the protein shakes due to diarrhea which persists. Denies abdominal pain, N/V.    MEDICATIONS  (STANDING):  aspirin enteric coated 81 milliGRAM(s) Oral daily  atorvastatin 80 milliGRAM(s) Oral at bedtime  cholestyramine Powder (Sugar-Free) 4 Gram(s) Oral two times a day  ciprofloxacin     Tablet 250 milliGRAM(s) Oral at bedtime  dextrose 5%. 1000 milliLiter(s) (50 mL/Hr) IV Continuous <Continuous>  dextrose 50% Injectable 12.5 Gram(s) IV Push once  dextrose 50% Injectable 25 Gram(s) IV Push once  dextrose 50% Injectable 25 Gram(s) IV Push once  epoetin analilia Injectable 64412 Unit(s) IV Push <User Schedule>  gabapentin 300 milliGRAM(s) Oral at bedtime  heparin  Injectable 5000 Unit(s) SubCutaneous every 8 hours  insulin lispro (HumaLOG) corrective regimen sliding scale   SubCutaneous three times a day before meals  insulin lispro (HumaLOG) corrective regimen sliding scale   SubCutaneous at bedtime  isosorbide   mononitrate ER Tablet (IMDUR) 30 milliGRAM(s) Oral at bedtime  isosorbide   mononitrate ER Tablet (IMDUR) 60 milliGRAM(s) Oral daily  lactobacillus acidophilus 1 Tablet(s) Oral three times a day with meals  lidocaine   Patch 1 Patch Transdermal daily  metoprolol tartrate 50 milliGRAM(s) Oral two times a day  metroNIDAZOLE    Tablet 500 milliGRAM(s) Oral every 8 hours  NIFEdipine XL 60 milliGRAM(s) Oral daily  pantoprazole    Tablet 40 milliGRAM(s) Oral before breakfast    MEDICATIONS  (PRN):  bismuth subsalicylate Liquid 30 milliLiter(s) Oral three times a day PRN Diarrhea  dextrose 40% Gel 15 Gram(s) Oral once PRN Blood Glucose LESS THAN 70 milliGRAM(s)/deciliter  glucagon  Injectable 1 milliGRAM(s) IntraMuscular once PRN Glucose LESS THAN 70 milligrams/deciliter      Allergies    ertapenem (Urticaria)  Purell (Rash)    Intolerances        REVIEW OF SYSTEMS:  CONSTITUTIONAL: + general weakness; No fever or chills  HEENT: No headache, no sore throat  RESPIRATORY: No cough, wheezing, or shortness of breath  CARDIOVASCULAR: No chest pain, palpitations, or leg swelling  GASTROINTESTINAL: No abd pain, nausea, vomiting; +diarrhea  GENITOURINARY: No dysuria, frequency, or hematuria  NEUROLOGICAL: No focal weakness or dizziness  MUSCULOSKELETAL: No myalgias     Vital Signs Last 24 Hrs  T(C): 37.3 (11 Jan 2019 08:06), Max: 37.3 (11 Jan 2019 08:06)  T(F): 99.1 (11 Jan 2019 08:06), Max: 99.1 (11 Jan 2019 08:06)  HR: 80 (11 Jan 2019 08:06) (74 - 82)  BP: 128/72 (11 Jan 2019 08:06) (122/67 - 149/71)  BP(mean): --  RR: 18 (11 Jan 2019 08:06) (16 - 18)  SpO2: 100% (11 Jan 2019 08:06) (95% - 100%)    PHYSICAL EXAM:  GENERAL: NAD  HEENT: EOMI, moist mucous membranes  CHEST/LUNG: CTA b/l, no rales, wheezes, or rhonchi  HEART: RRR, S1, S2  ABDOMEN: BS+, soft, nontender, nondistended  EXTREMITIES: No edema, cyanosis, or calf tenderness  NERVOUS SYSTEM: Answering questions and following commands    LABS:                        10.1   12.58 )-----------( 363      ( 11 Jan 2019 06:49 )             33.4     CBC Full  -  ( 11 Jan 2019 06:49 )  WBC Count : 12.58 K/uL  Hemoglobin : 10.1 g/dL  Hematocrit : 33.4 %  Platelet Count - Automated : 363 K/uL  Mean Cell Volume : 94.9 fl  Mean Cell Hemoglobin : 28.7 pg  Mean Cell Hemoglobin Concentration : 30.2 gm/dL  Auto Neutrophil # : x  Auto Lymphocyte # : x  Auto Monocyte # : x  Auto Eosinophil # : x  Auto Basophil # : x  Auto Neutrophil % : x  Auto Lymphocyte % : x  Auto Monocyte % : x  Auto Eosinophil % : x  Auto Basophil % : x    11 Jan 2019 06:49    140    |  103    |  9      ----------------------------<  146    3.4     |  30     |  3.10     Ca    7.8        11 Jan 2019 06:49        CAPILLARY BLOOD GLUCOSE  POCT Blood Glucose.: 195 mg/dL (11 Jan 2019 12:12)  POCT Blood Glucose.: 174 mg/dL (11 Jan 2019 07:57)  POCT Blood Glucose.: 132 mg/dL (10 Wei 2019 22:29)  POCT Blood Glucose.: 159 mg/dL (10 Wei 2019 17:00)      GI PCR Panel, Stool (collected 01-08-19 @ 21:40)  Source: .Stool Feces  Final Report (01-08-19 @ 23:57):    GI PCR Results: NOT detected    *******Please Note:*******    GI panel PCR evaluates for:    Campylobacter, Plesiomonas shigelloides, Salmonella,    Vibrio, Yersinia enterocolitica, Enteroaggregative    Escherichia coli (EAEC), Enteropathogenic E.coli (EPEC),    Enterotoxigenic E. coli (ETEC) lt/st, Shiga-like    toxin-producing E. coli (STEC) stx1/stx2,    Shigella/ Enteroinvasive E. coli (EIEC), Cryptosporidium,    Cyclospora cayetanensis, Entamoeba histolytica,    Giardia lamblia, Adenovirus F 40/41, Astrovirus,    Norovirus GI/GII, Rotavirus A, Sapovirus        RADIOLOGY & ADDITIONAL TESTS:    Personally reviewed.     Consultant(s) Notes Reviewed:  [x] YES  [ ] NO

## 2019-01-11 NOTE — PROGRESS NOTE ADULT - PROBLEM SELECTOR PLAN 1
Pt met sepsis criteria (fever, leukocytosis, source) on admission, suspect 2/2 acute on chronic cholecystitis   - CT Chest/Abd/Pel showed possible acute kevin on chronic kevin, decreased appearance of liver abscess - small abscess remains.  - Continue pain regimen and Tylenol PRN for fever.  - Monitor leukocytosis.  - BCx positive for Klebsiella -- repeat cultures negative so far.  - s/p IV Rocephin and Flagyl (1/6-10). Continue PO Cipro/Flagyl for 1 week, per ID.  - Has diarrhea, GI PCR and C diff negative. Continue cholestyramine and PRN Pepto Bismol per GI. Pt met sepsis criteria (fever, leukocytosis, source) on admission, suspect 2/2 acute on chronic cholecystitis   - CT Chest/Abd/Pel showed possible acute kevin on chronic kevin, decreased appearance of liver abscess - small abscess remains.  - Continue pain regimen and Tylenol PRN for fever.  - Monitor leukocytosis.  - BCx positive for Klebsiella -- repeat cultures negative so far.  - s/p IV Rocephin and Flagyl (1/6-10). Continue PO Cipro/Flagyl for 1 week, per ID.  - Has diarrhea, GI PCR and C diff negative. Continue cholestyramine and PRN Pepto Bismol per GI. -- encouraged pt to try pepto Bismol

## 2019-01-11 NOTE — PROGRESS NOTE ADULT - ASSESSMENT
68 female with a history of DM. PVD, HTN, CAD, CHF, Cirrhosis and ESRD On HD now admitted with abdominal pain. Found to have klebsiella sepsis and is on IV antibiotics with Cipro and Flagyl. Stable dialysis via perma cath, will schedule dialysis for AM. Will continue the epogen with dialysis. Heparin free dialysis. Will follow.

## 2019-01-11 NOTE — PROGRESS NOTE ADULT - SUBJECTIVE AND OBJECTIVE BOX
TAYLORIRENE  69y  Female    Patient is a 69y old  Female who presents with a chief complaint of abd pain, vomiting (11 Jan 2019 12:31)      out of bed to chair  complains of diarrhea today.  ate marginally.       PAST MEDICAL & SURGICAL HISTORY:  Cholecystitis with cholangitis  Acute urinary retention  Liver abscess  ESRD (end stage renal disease) on dialysis: MW  CAD (coronary artery disease): s/p stent in 2007  Diabetes  Myocardial infarction  Hypertension  H/O: hysterectomy          PHYSICAL EXAM:    T(C): 37.3 (01-11-19 @ 08:06), Max: 37.3 (01-11-19 @ 08:06)  HR: 80 (01-11-19 @ 08:06) (74 - 82)  BP: 128/72 (01-11-19 @ 08:06) (122/67 - 149/71)  RR: 18 (01-11-19 @ 08:06) (16 - 18)  SpO2: 100% (01-11-19 @ 08:06) (95% - 100%)  Wt(kg): --    I&O's Detail    10 Wei 2019 07:01  -  11 Jan 2019 07:00  --------------------------------------------------------  IN:    Oral Fluid: 220 mL  Total IN: 220 mL    OUT:    Other: 1500 mL    Voided: 100 mL  Total OUT: 1600 mL    Total NET: -1380 mL          Respiratory: clear anteriorly, decreased BS at bases  Cardiovascular: S1 S2  Gastrointestinal: soft NT ND +BS  Extremities: trace edema   Neuro: Awake and alert    MEDICATIONS  (STANDING):  aspirin enteric coated 81 milliGRAM(s) Oral daily  atorvastatin 80 milliGRAM(s) Oral at bedtime  cholestyramine Powder (Sugar-Free) 4 Gram(s) Oral two times a day  ciprofloxacin     Tablet 250 milliGRAM(s) Oral at bedtime  dextrose 5%. 1000 milliLiter(s) (50 mL/Hr) IV Continuous <Continuous>  dextrose 50% Injectable 12.5 Gram(s) IV Push once  dextrose 50% Injectable 25 Gram(s) IV Push once  dextrose 50% Injectable 25 Gram(s) IV Push once  epoetin analilia Injectable 32490 Unit(s) IV Push <User Schedule>  gabapentin 300 milliGRAM(s) Oral at bedtime  heparin  Injectable 5000 Unit(s) SubCutaneous every 8 hours  insulin lispro (HumaLOG) corrective regimen sliding scale   SubCutaneous three times a day before meals  insulin lispro (HumaLOG) corrective regimen sliding scale   SubCutaneous at bedtime  isosorbide   mononitrate ER Tablet (IMDUR) 30 milliGRAM(s) Oral at bedtime  isosorbide   mononitrate ER Tablet (IMDUR) 60 milliGRAM(s) Oral daily  lactobacillus acidophilus 1 Tablet(s) Oral three times a day with meals  lidocaine   Patch 1 Patch Transdermal daily  metoprolol tartrate 50 milliGRAM(s) Oral two times a day  metroNIDAZOLE    Tablet 500 milliGRAM(s) Oral every 8 hours  NIFEdipine XL 60 milliGRAM(s) Oral daily  pantoprazole    Tablet 40 milliGRAM(s) Oral before breakfast    MEDICATIONS  (PRN):  bismuth subsalicylate Liquid 30 milliLiter(s) Oral three times a day PRN Diarrhea  dextrose 40% Gel 15 Gram(s) Oral once PRN Blood Glucose LESS THAN 70 milliGRAM(s)/deciliter  glucagon  Injectable 1 milliGRAM(s) IntraMuscular once PRN Glucose LESS THAN 70 milligrams/deciliter                            10.1   12.58 )-----------( 363      ( 11 Jan 2019 06:49 )             33.4       01-11    140  |  103  |  9   ----------------------------<  146<H>  3.4<L>   |  30  |  3.10<H>    Ca    7.8<L>      11 Jan 2019 06:49    TPro  6.1  /  Alb  1.1<L>  /  TBili  0.4  /  DBili  x   /  AST  106<H>  /  ALT  38  /  AlkPhos  228<H>  01-10

## 2019-01-11 NOTE — PROGRESS NOTE ADULT - ASSESSMENT
70 yo female with hx of cholangitis/liver abscesses    cont current care     f/u outpt for eventual cholecsytectomy

## 2019-01-11 NOTE — PROGRESS NOTE ADULT - ASSESSMENT
69 yo F with PMH of ESRD on HD (M, W, F) via permacath started in June 2018, CAD s/p 1 stent 2007, DM type 2, HTN, MI, biliary stent, Liver abscess, obesity who presents to the ED with abdominal pain x2 days, 4 episodes of vomiting. Currently admitted for sepsis 2/2 acute on chronic cholecystitis vs failed treatment of liver abscess. Patient had recent admission at [facility] November 2018 for acute cholecystitis causing sepsis, S/P Perc Kevin 10/30 and ESBL E. coli bacteremia. She was treated with IV ertapenem and developed A fIb with RVR. ERCP was attempted at [facility] but was unsuccessful and was transferred to [facility] where ERCP was performed with stone extraction and stent placement. She also had another admission in November 2018 at [place] shortly after for acute pancreatitis. Patient has been in rehab s/p last discharge from [facility].     - Continue ASA, statin, Imdur   - At this point it would be ok not to resume Plavix  - Follow Surgery recs re: open kevin and CBD stent removal  - No acute changes on EKG compared to previous.  No cardiac arrhythmias while on tele.  Discontinue telemetry  - BP more controlled, would increase procardia XL if needed  - Continue metoprolol 50 mg q12H  - HD per renal  - Other cardiovascular workup will depend on clinical course.  - All other workup per primary team.  - Will continue to follow.    Lenore Long NP  Cardiology

## 2019-01-11 NOTE — PROGRESS NOTE ADULT - SUBJECTIVE AND OBJECTIVE BOX
[facility] -- Glynn Bullock, Claudia Davis Pannella, Patel, Savella  Office # [phone]    Follow Up:  Preop/elevated trops, CAD    Subjective/Observations: Denies any abdominal discomforts.  Denies any cardiac discomforts.  Comfortable on RA    REVIEW OF SYSTEMS: All other review of systems is negative unless indicated above    PAST MEDICAL & SURGICAL HISTORY:  Cholecystitis with cholangitis  Acute urinary retention  Liver abscess  ESRD (end stage renal disease) on dialysis: MWF  CAD (coronary artery disease): s/p stent in 2007  Diabetes  Myocardial infarction  Hypertension  H/O: hysterectomy    MEDICATIONS  (STANDING):  aspirin enteric coated 81 milliGRAM(s) Oral daily  atorvastatin 80 milliGRAM(s) Oral at bedtime  cholestyramine Powder (Sugar-Free) 4 Gram(s) Oral two times a day  ciprofloxacin     Tablet 250 milliGRAM(s) Oral at bedtime  dextrose 5%. 1000 milliLiter(s) (50 mL/Hr) IV Continuous <Continuous>  dextrose 50% Injectable 12.5 Gram(s) IV Push once  dextrose 50% Injectable 25 Gram(s) IV Push once  dextrose 50% Injectable 25 Gram(s) IV Push once  epoetin analilia Injectable 00008 Unit(s) IV Push <User Schedule>  gabapentin 300 milliGRAM(s) Oral at bedtime  heparin  Injectable 5000 Unit(s) SubCutaneous every 8 hours  insulin lispro (HumaLOG) corrective regimen sliding scale   SubCutaneous three times a day before meals  insulin lispro (HumaLOG) corrective regimen sliding scale   SubCutaneous at bedtime  isosorbide   mononitrate ER Tablet (IMDUR) 30 milliGRAM(s) Oral at bedtime  isosorbide   mononitrate ER Tablet (IMDUR) 60 milliGRAM(s) Oral daily  lactobacillus acidophilus 1 Tablet(s) Oral three times a day with meals  lidocaine   Patch 1 Patch Transdermal daily  metoprolol tartrate 50 milliGRAM(s) Oral two times a day  metroNIDAZOLE    Tablet 500 milliGRAM(s) Oral every 8 hours  NIFEdipine XL 60 milliGRAM(s) Oral daily  pantoprazole    Tablet 40 milliGRAM(s) Oral before breakfast  potassium chloride    Tablet ER 40 milliEquivalent(s) Oral once    MEDICATIONS  (PRN):  bismuth subsalicylate Liquid 30 milliLiter(s) Oral three times a day PRN Diarrhea  dextrose 40% Gel 15 Gram(s) Oral once PRN Blood Glucose LESS THAN 70 milliGRAM(s)/deciliter  glucagon  Injectable 1 milliGRAM(s) IntraMuscular once PRN Glucose LESS THAN 70 milligrams/deciliter    Allergies    ertapenem (Urticaria)  Purell (Rash)    Intolerances    Vital Signs Last 24 Hrs  T(C): 37.3 (11 Jan 2019 08:06), Max: 37.3 (11 Jan 2019 08:06)  T(F): 99.1 (11 Jan 2019 08:06), Max: 99.1 (11 Jan 2019 08:06)  HR: 80 (11 Jan 2019 08:06) (74 - 82)  BP: 128/72 (11 Jan 2019 08:06) (122/67 - 161/78)  BP(mean): --  RR: 18 (11 Jan 2019 08:06) (16 - 20)  SpO2: 100% (11 Jan 2019 08:06) (95% - 100%)    I&O's Summary    10 Wei 2019 07:01  -  11 Jan 2019 07:00  --------------------------------------------------------  IN: 220 mL / OUT: 1600 mL / NET: -1380 mL    PHYSICAL EXAM:  TELE: NSR  Constitutional: NAD, awake and alert, obese  HEENT: Moist Mucous Membranes, Anicteric  Pulmonary: Non-labored, breath sounds are clear bilaterally, No wheezing, rales or rhonchi  Cardiovascular: Regular, S1 and S2, No murmurs, rubs, gallops or clicks  Gastrointestinal: Bowel Sounds present, soft, nontender.   Lymph: No peripheral edema. No lymphadenopathy.  Skin: No visible rashes or ulcers.  Psych:  Mood & affect appropriate    LABS: All Labs Reviewed:                        10.1 12.58 )-----------( 363      ( 11 Jan 2019 06:49 )             33.4                         9.2    10.90 )-----------( 310      ( 10 Wei 2019 07:20 )             30.1                         9.9    12.14 )-----------( 283      ( 09 Jan 2019 06:31 )             31.9     11 Jan 2019 06:49    140    |  103    |  9      ----------------------------<  146    3.4     |  30     |  3.10   10 Wei 2019 07:20    138    |  102    |  16     ----------------------------<  94     3.6     |  29     |  4.40   09 Jan 2019 06:31    139    |  102    |  10     ----------------------------<  175    3.5     |  30     |  3.40     Ca    7.8        11 Jan 2019 06:49  Ca    7.4        10 Wei 2019 07:20  Ca    7.2        09 Jan 2019 06:31    TPro  6.1    /  Alb  1.1    /  TBili  0.4    /  DBili  x      /  AST  106    /  ALT  38     /  AlkPhos  228    10 Wei 2019 07:20    < from: TTE Echo Doppler w/o Cont (05.04.18 @ 20:56) >     EXAM:  ECHO TTE W/O CON COMP W/DOPPLR       PROCEDURE DATE:  05/04/2018      INTERPRETATION:  Height 165cm. weight 95kg. blood pressure 129/81.   Technician TE. Indication for study dyspnea.    Aortic root 2.3 cm left atrium 4.4 cm left ventricular end-diastolic   diameter 5.7 cm left ventricular end-systolic diameter 4.3 cm septal wall   thickness 1.2 cm posterior wall thickness 1.2 cm visually equivocally   estimated ejection fraction 50-55%.    The aortic root is calcified and of normaldiameter. 3 distinct leaflets   of the aortic valve are not well demonstrated by this study. The   technician was unable to identify any significant gradient across this   valve to suggest hemodynamically significant aortic stenosis. Left atrium   isenlarged. The left ventricle was difficult to visualize by all   standard echocardiographic windows. Possibly the end-diastolic diameter   is mildly increased. The wall thickness is borderline increased. Any   significant focal wall motion abnormality cannot be ascertained by this   study. Visually estimated ejection fraction 50-55%. The mitral annulus is   calcified. The mitral valve leaflets are mildly thickened with a normal   EF slope and excursion. There is no evidence for hemodynamically   significant mitral stenosis. On the very limited color flow Doppler   portion examination mild mitral regurgitation suggested.    Conclusion:  1. Technically difficult limited supine study. Please note that this is a   portable study and the study is also limited due to patient's body   habitus.  2. 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  3. Enlarged left atrium with associated mild mitral regurgitation.  4. Very limited visualization left ventricle which may represent mild   left ventricular concentric hypertrophy with a well-preserved ejection   fraction as described above.  5. A very small posterior pericardial effusion is suggested but not   diagnostic.  6. Correlate these limited findings clinically.    SALVADOR PHILLIPS M.D., ATTENDING CARDIOLOGIST  This document has been electronically signed. May  5 2018  9:47AM     < end of copied text >    < from: CT Chest w/ IV Cont (01.02.19 @ 15:04) >    EXAM:  CT ABDOMEN AND PELVIS IC                          EXAM:  CT CHEST IC                          PROCEDURE DATE:  01/02/2019      INTERPRETATION:  .    CLINICAL INFORMATION: Lower chest pain.    TECHNIQUE: Helical axial images were obtained from the thoracic inlet to   the pubic symphysis. 95 mls of Omnipaque-350 was administered   intravenously without complication and 5 mls were discarded. Oral   contrast was not administered. Sagittal and coronal reconstructed images   were obtained from the source data.    COMPARISON: Most recent prior CT examination of the abdomen and pelvis   from 11/26/2018. Prior MRI examination of the abdomen from 11/27/2018.   Prior chest, abdomen, and pelvis CT examination from 10/29/2018.    FINDINGS: Scattered subcentimeter sized lymph nodes are noted within the   axillary regions, mediastinum, and hilar areas.    The heart size is enlarged. The pericardium appears unremarkable. There   is a right sided dialysis catheter with its tip at the right atrium. A   left-sided IJ central line is noted with the tip at the SVC. No filling   defects are notable within the pulmonary arterial vessels. There are   atherosclerotic calcifications of the imaged coronary arteries, aorta,   and branch vessels. The imaged portions of the aorta are normal in   caliber.    The central airways are patent. Patchy groundglass opacities are notable   throughout both lungs with resultant mosaic attenuation. Scattered areas   of linear atelectasis are notable within the bilateral lung bases, left   upper lobe, and lingula.    There is redemonstration of nonspecific gallbladder wall thickening   and/or edema and/or fat deposition. Sludge and/or stones are noted within   the lumen of the gallbladder. A cholecystostomy tube tract is notable   along the right upper anterior abdominal wall. A small amount of   pneumobilia is noted, compatible with stent patency. The common bile duct   remains dilated and measures up to 1.2 cm. A common bile duct stent is   again noted. No radiopaque stones are notable adjacent to the stent.   Previously noted abscesses within the liver appear less conspicuous   compared to the prior MRI and CT examinations. A small low-attenuation   focus is noted within the central hepatic area measuring up to 8 mm   (series 2, image 114).     The pancreas, spleen, and adrenal glands appear unremarkable.    There is atrophy of the bilateral kidneys. A left-sided renal cyst   appears unchanged. There is minimal nonspecific bilateral perinephric   thickening. There is no hydronephrosis bilaterally. Arterial vascular   calcifications are noted in the vicinity of the bilateral renal   collecting systems. The urinary bladder appears unremarkable.    There are multiple scattered nonspecificsubcentimeter retroperitoneal   and mesenteric lymph nodes.    There is no bowel obstruction or abdominal ascites. A duodenal   diverticulum appears unchanged. Gas and stool are notable throughout the   large bowel loops. The appendix is normal.    The patient is status post hysterectomy. No adnexal masses are noted.    Multilevel degenerative changes notable within the imaged spine. There is   a moderate compression deformity of the L2 vertebral body which appears   unchanged. There is also a mild superior endplate deformity of the T12   vertebral body which also appears unchanged.    Areas of mixed lucency and sclerosis adjacent to the bilateral SI joints   appear unchanged. Mild diffuse osteosclerosis may be compatible with   early renal osteodystrophy.    IMPRESSION:    CT chest:  1. Groundglass mosaic attenuation to the lungs which may reflect small   airways or small vessel disease.    CT abdomen and pelvis:  1. Chronic cholecystitis, as seen on prior exams. A superimposed acute   component cannot be completely excluded.  2. Redemonstration of a common bile duct stent with pneumobilia,   compatible with stent patency.  3. Interval decreased conspicuity of previous noted abscesses throughout   the liver with a small abscess remaining, as discussed.  4. Other chronic nonemergent findings, as discussed.    CARLITO BEE M.D., ATTENDING RADIOLOGIST  This document has been electronically signed. Jan 2 2019  3:30PM      < end of copied text >

## 2019-01-11 NOTE — PROGRESS NOTE ADULT - PROBLEM SELECTOR PLAN 2
Suspect acute on chronic per CT and symptoms on admission --- HIDA r/out acute kevin at the time of the scan so perhaps pt had obstruction that cleared by time of HIDA  - s/p IV Rocephin and Flagyl (1/6-10). Continue PO Cipro/Flagyl for 1 week, per ID.  - Per Gen Surg (Dr. Campos), pt will benefit from lap kevin but when better surgically optimized.   - Per GI (Dr. Camacho), CBD stent still in place and will need to be removed after lap kevin.

## 2019-01-12 LAB
ANION GAP SERPL CALC-SCNC: 7 MMOL/L — SIGNIFICANT CHANGE UP (ref 5–17)
BUN SERPL-MCNC: 18 MG/DL — SIGNIFICANT CHANGE UP (ref 7–23)
CALCIUM SERPL-MCNC: 7.7 MG/DL — LOW (ref 8.5–10.1)
CHLORIDE SERPL-SCNC: 105 MMOL/L — SIGNIFICANT CHANGE UP (ref 96–108)
CO2 SERPL-SCNC: 27 MMOL/L — SIGNIFICANT CHANGE UP (ref 22–31)
CREAT SERPL-MCNC: 4.2 MG/DL — HIGH (ref 0.5–1.3)
GLUCOSE SERPL-MCNC: 96 MG/DL — SIGNIFICANT CHANGE UP (ref 70–99)
HCT VFR BLD CALC: 32.7 % — LOW (ref 34.5–45)
HGB BLD-MCNC: 9.8 G/DL — LOW (ref 11.5–15.5)
MCHC RBC-ENTMCNC: 28.5 PG — SIGNIFICANT CHANGE UP (ref 27–34)
MCHC RBC-ENTMCNC: 30 GM/DL — LOW (ref 32–36)
MCV RBC AUTO: 95.1 FL — SIGNIFICANT CHANGE UP (ref 80–100)
NRBC # BLD: 0 /100 WBCS — SIGNIFICANT CHANGE UP (ref 0–0)
PLATELET # BLD AUTO: 399 K/UL — SIGNIFICANT CHANGE UP (ref 150–400)
POTASSIUM SERPL-MCNC: 3.9 MMOL/L — SIGNIFICANT CHANGE UP (ref 3.5–5.3)
POTASSIUM SERPL-SCNC: 3.9 MMOL/L — SIGNIFICANT CHANGE UP (ref 3.5–5.3)
RBC # BLD: 3.44 M/UL — LOW (ref 3.8–5.2)
RBC # FLD: 20.3 % — HIGH (ref 10.3–14.5)
SODIUM SERPL-SCNC: 139 MMOL/L — SIGNIFICANT CHANGE UP (ref 135–145)
WBC # BLD: 15.95 K/UL — HIGH (ref 3.8–10.5)
WBC # FLD AUTO: 15.95 K/UL — HIGH (ref 3.8–10.5)

## 2019-01-12 PROCEDURE — 99233 SBSQ HOSP IP/OBS HIGH 50: CPT

## 2019-01-12 PROCEDURE — 99232 SBSQ HOSP IP/OBS MODERATE 35: CPT

## 2019-01-12 RX ADMIN — Medication 500 MILLIGRAM(S): at 05:29

## 2019-01-12 RX ADMIN — Medication 60 MILLIGRAM(S): at 05:29

## 2019-01-12 RX ADMIN — Medication 1 TABLET(S): at 17:24

## 2019-01-12 RX ADMIN — ISOSORBIDE MONONITRATE 30 MILLIGRAM(S): 60 TABLET, EXTENDED RELEASE ORAL at 22:09

## 2019-01-12 RX ADMIN — HEPARIN SODIUM 5000 UNIT(S): 5000 INJECTION INTRAVENOUS; SUBCUTANEOUS at 05:30

## 2019-01-12 RX ADMIN — LIDOCAINE 1 PATCH: 4 CREAM TOPICAL at 13:49

## 2019-01-12 RX ADMIN — CHOLESTYRAMINE 4 GRAM(S): 4 POWDER, FOR SUSPENSION ORAL at 21:53

## 2019-01-12 RX ADMIN — GABAPENTIN 300 MILLIGRAM(S): 400 CAPSULE ORAL at 22:09

## 2019-01-12 RX ADMIN — Medication 1 TABLET(S): at 09:06

## 2019-01-12 RX ADMIN — Medication 250 MILLIGRAM(S): at 22:10

## 2019-01-12 RX ADMIN — Medication 50 MILLIGRAM(S): at 05:30

## 2019-01-12 RX ADMIN — HEPARIN SODIUM 5000 UNIT(S): 5000 INJECTION INTRAVENOUS; SUBCUTANEOUS at 13:50

## 2019-01-12 RX ADMIN — Medication 50 MILLIGRAM(S): at 17:23

## 2019-01-12 RX ADMIN — Medication 500 MILLIGRAM(S): at 13:50

## 2019-01-12 RX ADMIN — Medication 1 TABLET(S): at 13:49

## 2019-01-12 RX ADMIN — ATORVASTATIN CALCIUM 80 MILLIGRAM(S): 80 TABLET, FILM COATED ORAL at 22:10

## 2019-01-12 RX ADMIN — Medication 81 MILLIGRAM(S): at 13:50

## 2019-01-12 RX ADMIN — PANTOPRAZOLE SODIUM 40 MILLIGRAM(S): 20 TABLET, DELAYED RELEASE ORAL at 05:30

## 2019-01-12 RX ADMIN — Medication 2: at 17:24

## 2019-01-12 RX ADMIN — ERYTHROPOIETIN 10000 UNIT(S): 10000 INJECTION, SOLUTION INTRAVENOUS; SUBCUTANEOUS at 12:34

## 2019-01-12 RX ADMIN — Medication 500 MILLIGRAM(S): at 22:20

## 2019-01-12 RX ADMIN — ISOSORBIDE MONONITRATE 60 MILLIGRAM(S): 60 TABLET, EXTENDED RELEASE ORAL at 13:49

## 2019-01-12 RX ADMIN — CHOLESTYRAMINE 4 GRAM(S): 4 POWDER, FOR SUSPENSION ORAL at 13:49

## 2019-01-12 NOTE — PROGRESS NOTE ADULT - ASSESSMENT
67yo F with ESRD on HD (M, W, F), CAD, DM, HTN, MI, recent admission with ascending cholangitis with ESBL E coli bacteremia s/p biliary stent, liver abscesses (on Tigecycline), re-admitted with abdominal pain x2 days, 4 episodes of vomiting, found to have Klebsiella bacteremia, possibly due to biliary source. Patient feels better, no more pain. Stable HD session today. Further plans as per I.D./G.I.  Continue with T.I.W. dialysis per routine.

## 2019-01-12 NOTE — PROGRESS NOTE ADULT - SUBJECTIVE AND OBJECTIVE BOX
Patient seen in follow up for ESRD. Seen at dialysis earlier. No specific c/o.    Cholecystitis with cholangitis  Acute urinary retention  Liver abscess  ESRD (end stage renal disease) on dialysis  CAD (coronary artery disease)  Diabetes  CRF (chronic renal failure)  Hypertension  Pneumonia  Myocardial infarction  Hypertension  Diabetes    MEDICATIONS  (STANDING):  aspirin enteric coated 81 milliGRAM(s) Oral daily  atorvastatin 80 milliGRAM(s) Oral at bedtime  cholestyramine Powder (Sugar-Free) 4 Gram(s) Oral two times a day  ciprofloxacin     Tablet 250 milliGRAM(s) Oral at bedtime  dextrose 5%. 1000 milliLiter(s) (50 mL/Hr) IV Continuous <Continuous>  dextrose 50% Injectable 12.5 Gram(s) IV Push once  dextrose 50% Injectable 25 Gram(s) IV Push once  dextrose 50% Injectable 25 Gram(s) IV Push once  epoetin analilia Injectable 27033 Unit(s) IV Push <User Schedule>  gabapentin 300 milliGRAM(s) Oral at bedtime  heparin  Injectable 5000 Unit(s) SubCutaneous every 8 hours  insulin lispro (HumaLOG) corrective regimen sliding scale   SubCutaneous three times a day before meals  insulin lispro (HumaLOG) corrective regimen sliding scale   SubCutaneous at bedtime  isosorbide   mononitrate ER Tablet (IMDUR) 30 milliGRAM(s) Oral at bedtime  isosorbide   mononitrate ER Tablet (IMDUR) 60 milliGRAM(s) Oral daily  lactobacillus acidophilus 1 Tablet(s) Oral three times a day with meals  lidocaine   Patch 1 Patch Transdermal daily  metoprolol tartrate 50 milliGRAM(s) Oral two times a day  metroNIDAZOLE    Tablet 500 milliGRAM(s) Oral every 8 hours  NIFEdipine XL 60 milliGRAM(s) Oral daily  pantoprazole    Tablet 40 milliGRAM(s) Oral before breakfast    MEDICATIONS  (PRN):  bismuth subsalicylate Liquid 30 milliLiter(s) Oral three times a day PRN Diarrhea  dextrose 40% Gel 15 Gram(s) Oral once PRN Blood Glucose LESS THAN 70 milliGRAM(s)/deciliter  glucagon  Injectable 1 milliGRAM(s) IntraMuscular once PRN Glucose LESS THAN 70 milligrams/deciliter    T(C): 36.9 (01-12-19 @ 15:22), Max: 37.3 (01-11-19 @ 08:06)  HR: 80 (01-12-19 @ 15:22) (72 - 80)  BP: 136/73 (01-12-19 @ 15:22) (109/69 - 172/57)  RR: 17 (01-12-19 @ 15:22) (16 - 18)  SpO2: 99% (01-12-19 @ 15:22) (92% - 100%)  Wt(kg): --  I&O's Detail    11 Jan 2019 07:01  -  12 Jan 2019 07:00  --------------------------------------------------------  IN:    Oral Fluid: 240 mL  Total IN: 240 mL    OUT:  Total OUT: 0 mL    Total NET: 240 mL      PHYSICAL EXAM:  General: NAD, conversant  PC dressed  Respiratory: b/l air entry, clear  Cardiovascular: S1 S2 reg  Gastrointestinal: soft, NT  Extremities: no edema      CBC Full  -  ( 12 Jan 2019 06:46 )  WBC Count : 15.95 K/uL  Hemoglobin : 9.8 g/dL  Hematocrit : 32.7 %  Platelet Count - Automated : 399 K/uL  Mean Cell Volume : 95.1 fl  Mean Cell Hemoglobin : 28.5 pg  Mean Cell Hemoglobin Concentration : 30.0 gm/dL  Auto Neutrophil # : x  Auto Lymphocyte # : x  Auto Monocyte # : x  Auto Eosinophil # : x  Auto Basophil # : x  Auto Neutrophil % : x  Auto Lymphocyte % : x  Auto Monocyte % : x  Auto Eosinophil % : x  Auto Basophil % : x    01-12    139  |  105  |  18  ----------------------------<  96  3.9   |  27  |  4.20<H>    Ca    7.7<L>      12 Jan 2019 06:46    TPro  6.7  /  Alb  1.2<L>  /  TBili  0.3  /  DBili  .10  /  AST  166<H>  /  ALT  57  /  AlkPhos  198<H>  01-12        Sodium, Serum: 139 (01-12 @ 06:46)  Sodium, Serum: 140 (01-11 @ 06:49)  Sodium, Serum: 138 (01-10 @ 07:20)    Creatinine, Serum: 4.20 (01-12 @ 06:46)  Creatinine, Serum: 3.10 (01-11 @ 06:49)  Creatinine, Serum: 4.40 (01-10 @ 07:20)    Potassium, Serum: 3.9 (01-12 @ 06:46)  Potassium, Serum: 3.4 (01-11 @ 06:49)  Potassium, Serum: 3.6 (01-10 @ 07:20)    Hemoglobin: 9.8 (01-12 @ 06:46)  Hemoglobin: 10.1 (01-11 @ 06:49)  Hemoglobin: 9.2 (01-10 @ 07:20)

## 2019-01-12 NOTE — PROGRESS NOTE ADULT - PROBLEM SELECTOR PLAN 1
Pt met sepsis criteria (fever, leukocytosis, source) on admission, suspect 2/2 acute on chronic cholecystitis   - CT Chest/Abd/Pel showed possible acute kevin on chronic kevin, decreased appearance of liver abscess - small abscess remains.  - Continue pain regimen and Tylenol PRN for fever.  - Monitor leukocytosis.  - BCx positive for Klebsiella -- repeat cultures negative so far.  - s/p IV Rocephin and Flagyl (1/6-10). Continue PO Cipro/Flagyl for 1 week, per ID.  - Has diarrhea, GI PCR and C diff negative. Continue cholestyramine and PRN Pepto Bismol per GI. -- encouraged pt to try pepto Bismol

## 2019-01-12 NOTE — PROGRESS NOTE ADULT - PROBLEM SELECTOR PLAN 5
s/p stent 2007  - Continue home dose atorvastatin, metoprolol (with hold parameters) and aspirin.  - Had been holding Plavix for possible lap kevin -- no need to restart as stent is >10 years old.

## 2019-01-12 NOTE — PROGRESS NOTE ADULT - SUBJECTIVE AND OBJECTIVE BOX
Patient is a 69y old  Female who presents with a chief complaint of abd pain, vomiting (12 Jan 2019 15:22)      INTERVAL HPI/OVERNIGHT EVENTS: 67yo F with PMH of ESRD on HD (M, W, F), CAD s/p stents 2007, Type 2 DM, HTN, MI, recent admission with ascending cholangitis with ESBL E coli bacteremia s/p biliary stent, recent admission with liver abscesses (on Tigecycline), who presents to the ED with abdominal pain x2 days, 4 episodes of vomiting, admitted for sepsis due to suspected acute on chronic cholecystitis found to have Klebsiella bacteremia. pt feels better, no more pain.     MEDICATIONS  (STANDING):  aspirin enteric coated 81 milliGRAM(s) Oral daily  atorvastatin 80 milliGRAM(s) Oral at bedtime  cholestyramine Powder (Sugar-Free) 4 Gram(s) Oral two times a day  ciprofloxacin     Tablet 250 milliGRAM(s) Oral at bedtime  dextrose 5%. 1000 milliLiter(s) (50 mL/Hr) IV Continuous <Continuous>  dextrose 50% Injectable 12.5 Gram(s) IV Push once  dextrose 50% Injectable 25 Gram(s) IV Push once  dextrose 50% Injectable 25 Gram(s) IV Push once  epoetin analilia Injectable 33262 Unit(s) IV Push <User Schedule>  gabapentin 300 milliGRAM(s) Oral at bedtime  heparin  Injectable 5000 Unit(s) SubCutaneous every 8 hours  insulin lispro (HumaLOG) corrective regimen sliding scale   SubCutaneous three times a day before meals  insulin lispro (HumaLOG) corrective regimen sliding scale   SubCutaneous at bedtime  isosorbide   mononitrate ER Tablet (IMDUR) 30 milliGRAM(s) Oral at bedtime  isosorbide   mononitrate ER Tablet (IMDUR) 60 milliGRAM(s) Oral daily  lactobacillus acidophilus 1 Tablet(s) Oral three times a day with meals  lidocaine   Patch 1 Patch Transdermal daily  metoprolol tartrate 50 milliGRAM(s) Oral two times a day  metroNIDAZOLE    Tablet 500 milliGRAM(s) Oral every 8 hours  NIFEdipine XL 60 milliGRAM(s) Oral daily  pantoprazole    Tablet 40 milliGRAM(s) Oral before breakfast    MEDICATIONS  (PRN):  bismuth subsalicylate Liquid 30 milliLiter(s) Oral three times a day PRN Diarrhea  dextrose 40% Gel 15 Gram(s) Oral once PRN Blood Glucose LESS THAN 70 milliGRAM(s)/deciliter  glucagon  Injectable 1 milliGRAM(s) IntraMuscular once PRN Glucose LESS THAN 70 milligrams/deciliter      Allergies    ertapenem (Urticaria)  Purell (Rash)    Intolerances        REVIEW OF SYSTEMS:  CONSTITUTIONAL: No fever, weight loss, or fatigue  EYES: No eye pain, visual disturbances, or discharge  ENMT:  No difficulty hearing, tinnitus, vertigo; No sinus or throat pain  NECK: No pain or stiffness  BREASTS: No pain, masses, or nipple discharge  RESPIRATORY: No cough, wheezing, chills or hemoptysis; No shortness of breath  CARDIOVASCULAR: No chest pain, palpitations, dizziness, or leg swelling  GASTROINTESTINAL: No abdominal or epigastric pain. No nausea, vomiting, or hematemesis; No diarrhea or constipation. No melena or hematochezia.  GENITOURINARY: No dysuria, frequency, hematuria, or incontinence  NEUROLOGICAL: No headaches, memory loss, loss of strength, numbness, or tremors  SKIN: No itching, burning, rashes, or lesions   LYMPH NODES: No enlarged glands  ENDOCRINE: No heat or cold intolerance; No hair loss; No polydipsia or polyuria  MUSCULOSKELETAL: No joint pain or swelling; No muscle, back, or extremity pain  PSYCHIATRIC: No depression, anxiety, mood swings, or difficulty sleeping  HEME/LYMPH: No easy bruising, or bleeding gums  ALLERGY AND IMMUNOLOGIC: No hives or eczema    Vital Signs Last 24 Hrs  T(C): 36.9 (12 Jan 2019 15:22), Max: 36.9 (12 Jan 2019 15:22)  T(F): 98.5 (12 Jan 2019 15:22), Max: 98.5 (12 Jan 2019 15:22)  HR: 80 (12 Jan 2019 15:22) (72 - 80)  BP: 136/73 (12 Jan 2019 15:22) (112/62 - 172/57)  BP(mean): --  RR: 17 (12 Jan 2019 15:22) (16 - 18)  SpO2: 99% (12 Jan 2019 15:22) (92% - 100%)    PHYSICAL EXAM:  GENERAL: NAD, well-groomed, well-developed  HEAD:  Atraumatic, Normocephalic  EYES: EOMI, PERRLA, conjunctiva and sclera clear  ENMT: No tonsillar erythema, exudates, or enlargement; Moist mucous membranes, Good dentition, No lesions  NECK: Supple, No JVD, Normal thyroid  NERVOUS SYSTEM:  Alert & Oriented X3, Good concentration; Motor Strength 5/5 B/L upper and lower extremities; DTRs 2+ intact and symmetric  CHEST/LUNG: Clear to auscultation bilaterally; No rales, rhonchi, wheezing, or rubs  HEART: Regular rate and rhythm; No murmurs, rubs, or gallops  ABDOMEN: Soft, Nontender, Nondistended; Bowel sounds present  EXTREMITIES:  2+ Peripheral Pulses, No clubbing, cyanosis, or edema  LYMPH: No lymphadenopathy noted  SKIN: No rashes or lesions    LABS:                        9.8    15.95 )-----------( 399      ( 12 Jan 2019 06:46 )             32.7     12 Jan 2019 06:46    139    |  105    |  18     ----------------------------<  96     3.9     |  27     |  4.20     Ca    7.7        12 Jan 2019 06:46    TPro  6.7    /  Alb  1.2    /  TBili  0.3    /  DBili  .10    /  AST  166    /  ALT  57     /  AlkPhos  198    12 Jan 2019 06:46        CAPILLARY BLOOD GLUCOSE      POCT Blood Glucose.: 144 mg/dL (12 Jan 2019 11:37)  POCT Blood Glucose.: 101 mg/dL (12 Jan 2019 08:05)  POCT Blood Glucose.: 164 mg/dL (11 Jan 2019 22:03)  POCT Blood Glucose.: 168 mg/dL (11 Jan 2019 17:19)      RADIOLOGY & ADDITIONAL TESTS:    Imaging Personally Reviewed:  [ x] YES  [ ] NO    Consultant(s) Notes Reviewed:  [x ] YES  [ ] NO    Care Discussed with Consultants/Other Providers [x ] YES  [ ] NO

## 2019-01-12 NOTE — PROGRESS NOTE ADULT - SUBJECTIVE AND OBJECTIVE BOX
Subjective: Pt tolerating po, denies any pain.    Objective:  Vital Signs Last 24 Hrs  T(C): 36.6 (12 Jan 2019 09:36), Max: 36.7 (11 Jan 2019 16:48)  T(F): 97.9 (12 Jan 2019 09:36), Max: 98.1 (11 Jan 2019 23:35)  HR: 78 (12 Jan 2019 09:36) (72 - 78)  BP: 172/57 (12 Jan 2019 09:36) (112/62 - 172/57)  BP(mean): --  RR: 16 (12 Jan 2019 09:36) (16 - 18)  SpO2: 92% (12 Jan 2019 09:36) (92% - 100%)    Heent: QUENTIN, FREOM  Pul: clear  Cor: RSR, without murmurs  Abdomen: normal bowel sounds, without distension, without tenderness  Extremities: without edema, motor/sensory intact, without calf pain, Homans sign negative.                        9.8    15.95 )-----------( 399      ( 12 Jan 2019 06:46 )             32.7       01-12    139  |  105  |  18  ----------------------------<  96  3.9   |  27  |  4.20<H>    Ca    7.7<L>      12 Jan 2019 06:46    TPro  6.7  /  Alb  1.2<L>  /  TBili  0.3  /  DBili  .10  /  AST  166<H>  /  ALT  57  /  AlkPhos  198<H>  01-12 01-11 @ 07:01  -  01-12 @ 07:00  --------------------------------------------------------  IN:    Oral Fluid: 240 mL  Total IN: 240 mL    OUT:  Total OUT: 0 mL    Total NET: 240 mL

## 2019-01-12 NOTE — PROGRESS NOTE ADULT - ASSESSMENT
67 yo F with PMH of ESRD on HD (M, W, F) via permacath started in June 2018, CAD s/p 1 stent 2007, DM type 2, HTN, MI, biliary stent, Liver abscess, obesity who presents to the ED with abdominal pain x2 days, 4 episodes of vomiting. Currently admitted for sepsis 2/2 acute on chronic cholecystitis vs failed treatment of liver abscess. Patient had recent admission at [facility] November 2018 for acute cholecystitis causing sepsis, S/P Perc Kevin 10/30 and ESBL E. coli bacteremia. She was treated with IV ertapenem and developed A fIb with RVR. ERCP was attempted at [facility] but was unsuccessful and was transferred to [facility] where ERCP was performed with stone extraction and stent placement. She also had another admission in November 2018 at [place] shortly after for acute pancreatitis. Patient has been in rehab s/p last discharge from [facility].     - Continue ASA, statin, Imdur   - No need to resume Plavix as her stent is remote  - Follow Surgery recs re: open kevin and CBD stent removal  - No acute changes on EKG compared to previous.    - BP more controlled, would increase procardia XL if needed  - Continue metoprolol 50 mg q12H  - HD per renal, with fluid removal of 1.5 L  - Other cardiovascular workup will depend on clinical course.  - Awaiting discharge  - All other workup per primary team.  - Will continue to follow.    Lenore Long NP  Cardiology

## 2019-01-12 NOTE — PROGRESS NOTE ADULT - SUBJECTIVE AND OBJECTIVE BOX
INTERVAL HPI/OVERNIGHT EVENTS:  pt seen and examined in dialysis   denies n/v/abd pain  + bm     MEDICATIONS  (STANDING):  aspirin enteric coated 81 milliGRAM(s) Oral daily  atorvastatin 80 milliGRAM(s) Oral at bedtime  cholestyramine Powder (Sugar-Free) 4 Gram(s) Oral two times a day  ciprofloxacin     Tablet 250 milliGRAM(s) Oral at bedtime  dextrose 5%. 1000 milliLiter(s) (50 mL/Hr) IV Continuous <Continuous>  dextrose 50% Injectable 12.5 Gram(s) IV Push once  dextrose 50% Injectable 25 Gram(s) IV Push once  dextrose 50% Injectable 25 Gram(s) IV Push once  epoetin analilia Injectable 28474 Unit(s) IV Push <User Schedule>  gabapentin 300 milliGRAM(s) Oral at bedtime  heparin  Injectable 5000 Unit(s) SubCutaneous every 8 hours  insulin lispro (HumaLOG) corrective regimen sliding scale   SubCutaneous three times a day before meals  insulin lispro (HumaLOG) corrective regimen sliding scale   SubCutaneous at bedtime  isosorbide   mononitrate ER Tablet (IMDUR) 30 milliGRAM(s) Oral at bedtime  isosorbide   mononitrate ER Tablet (IMDUR) 60 milliGRAM(s) Oral daily  lactobacillus acidophilus 1 Tablet(s) Oral three times a day with meals  lidocaine   Patch 1 Patch Transdermal daily  metoprolol tartrate 50 milliGRAM(s) Oral two times a day  metroNIDAZOLE    Tablet 500 milliGRAM(s) Oral every 8 hours  NIFEdipine XL 60 milliGRAM(s) Oral daily  pantoprazole    Tablet 40 milliGRAM(s) Oral before breakfast  potassium chloride    Tablet ER 40 milliEquivalent(s) Oral once    MEDICATIONS  (PRN):  bismuth subsalicylate Liquid 30 milliLiter(s) Oral three times a day PRN Diarrhea  dextrose 40% Gel 15 Gram(s) Oral once PRN Blood Glucose LESS THAN 70 milliGRAM(s)/deciliter  glucagon  Injectable 1 milliGRAM(s) IntraMuscular once PRN Glucose LESS THAN 70 milligrams/deciliter      Allergies    ertapenem (Urticaria)  Purell (Rash)    Intolerances        Review of Systems:    General:  No wt loss, fevers, chills, night sweats, fatigue   Eyes:  Good vision, no reported pain  ENT:  No sore throat, pain, runny nose, dysphagia  CV:  No pain, palpitations, hypo/hypertension  Resp:  No dyspnea, cough, tachypnea, wheezing  GI:  No pain, No nausea, No vomiting, No diarrhea, No constipation, No weight loss, No fever, No pruritis, No rectal bleeding, No melena, No dysphagia  :  No pain, bleeding, incontinence, nocturia  Muscle:  No pain, weakness  Neuro:  No weakness, tingling, memory problems  Psych:  No fatigue, insomnia, mood problems, depression  Endocrine:  No polyuria, polydypsia, cold/heat intolerance  Heme:  No petechiae, ecchymosis, easy bruisability  Skin:  No rash, tattoos, scars, edema      Vital Signs Last 24 Hrs  T(C): 36.7 (12 Jan 2019 07:29), Max: 37.2 (11 Jan 2019 12:06)  T(F): 98 (12 Jan 2019 07:29), Max: 99 (11 Jan 2019 12:06)  HR: 74 (12 Jan 2019 07:29) (72 - 78)  BP: 153/73 (12 Jan 2019 07:29) (109/69 - 158/74)  BP(mean): --  RR: 18 (12 Jan 2019 07:29) (17 - 18)  SpO2: 100% (12 Jan 2019 07:29) (98% - 100%)    PHYSICAL EXAM:    Constitutional: NAD  HEENT: ncat  Neck: No LAD  Respiratory: dec bs  Cardiovascular: S1 and S2, RRR  Gastrointestinal: obese soft nt nd  Extremities: trace edema  Vascular: 2+ peripheral pulses  Neurological: awake alert  Skin: No rashes    LABS:                                             9.8    15.95 )-----------( 399      ( 12 Jan 2019 06:46 )             32.7   01-12    139  |  105  |  18  ----------------------------<  96  3.9   |  27  |  4.20<H>    Ca    7.7<L>      12 Jan 2019 06:46    TPro  6.7  /  Alb  1.2<L>  /  TBili  0.3  /  DBili  .10  /  AST  166<H>  /  ALT  57  /  AlkPhos  198<H>  01-12      RADIOLOGY & ADDITIONAL TESTS:

## 2019-01-12 NOTE — PROGRESS NOTE ADULT - SUBJECTIVE AND OBJECTIVE BOX
[facility] -- Glynn Bullock, Claudia Davis Pannella, Patel, Savella  Office # [phone]    Follow Up:  Preop/elevated trops, CAD    Subjective/Observations: Undergoing HD at the moment.  Comfortable on RA.  No complaints offered    REVIEW OF SYSTEMS: All other review of systems is negative unless indicated above    PAST MEDICAL & SURGICAL HISTORY:  Cholecystitis with cholangitis  Acute urinary retention  Liver abscess  ESRD (end stage renal disease) on dialysis: MWF  CAD (coronary artery disease): s/p stent in 2007  Diabetes  Myocardial infarction  Hypertension  H/O: hysterectomy    MEDICATIONS  (STANDING):  aspirin enteric coated 81 milliGRAM(s) Oral daily  atorvastatin 80 milliGRAM(s) Oral at bedtime  cholestyramine Powder (Sugar-Free) 4 Gram(s) Oral two times a day  ciprofloxacin     Tablet 250 milliGRAM(s) Oral at bedtime  dextrose 5%. 1000 milliLiter(s) (50 mL/Hr) IV Continuous <Continuous>  dextrose 50% Injectable 12.5 Gram(s) IV Push once  dextrose 50% Injectable 25 Gram(s) IV Push once  dextrose 50% Injectable 25 Gram(s) IV Push once  epoetin analilia Injectable 92661 Unit(s) IV Push <User Schedule>  gabapentin 300 milliGRAM(s) Oral at bedtime  heparin  Injectable 5000 Unit(s) SubCutaneous every 8 hours  insulin lispro (HumaLOG) corrective regimen sliding scale   SubCutaneous three times a day before meals  insulin lispro (HumaLOG) corrective regimen sliding scale   SubCutaneous at bedtime  isosorbide   mononitrate ER Tablet (IMDUR) 30 milliGRAM(s) Oral at bedtime  isosorbide   mononitrate ER Tablet (IMDUR) 60 milliGRAM(s) Oral daily  lactobacillus acidophilus 1 Tablet(s) Oral three times a day with meals  lidocaine   Patch 1 Patch Transdermal daily  metoprolol tartrate 50 milliGRAM(s) Oral two times a day  metroNIDAZOLE    Tablet 500 milliGRAM(s) Oral every 8 hours  NIFEdipine XL 60 milliGRAM(s) Oral daily  pantoprazole    Tablet 40 milliGRAM(s) Oral before breakfast    MEDICATIONS  (PRN):  bismuth subsalicylate Liquid 30 milliLiter(s) Oral three times a day PRN Diarrhea  dextrose 40% Gel 15 Gram(s) Oral once PRN Blood Glucose LESS THAN 70 milliGRAM(s)/deciliter  glucagon  Injectable 1 milliGRAM(s) IntraMuscular once PRN Glucose LESS THAN 70 milligrams/deciliter    Allergies    ertapenem (Urticaria)  Purell (Rash)    Intolerances  Vital Signs Last 24 Hrs  T(C): 36.7 (12 Jan 2019 07:29), Max: 36.7 (11 Jan 2019 16:48)  T(F): 98 (12 Jan 2019 07:29), Max: 98.1 (11 Jan 2019 23:35)  HR: 74 (12 Jan 2019 07:29) (72 - 75)  BP: 153/73 (12 Jan 2019 07:29) (112/62 - 158/74)  BP(mean): --  RR: 18 (12 Jan 2019 07:29) (17 - 18)  SpO2: 100% (12 Jan 2019 07:29) (98% - 100%)    I&O's Summary    11 Jan 2019 07:01  -  12 Jan 2019 07:00  --------------------------------------------------------  IN: 240 mL / OUT: 0 mL / NET: 240 mL    PHYSICAL EXAM:  TELE: Not on tele  Constitutional: NAD, awake and alert, obese  HEENT: Moist Mucous Membranes, Anicteric  Pulmonary: Non-labored, breath sounds are clear bilaterally, No wheezing, rales or rhonchi  Cardiovascular: Regular, S1 and S2, No murmurs, rubs, gallops or clicks  Gastrointestinal: Bowel Sounds present, soft, nontender.   Lymph: No peripheral edema. No lymphadenopathy.  Skin: No visible rashes or ulcers.  [facility] Permacath  Psych:  Mood & affect appropriate    LABS: All Labs Reviewed:                        9.8    15.95 )-----------( 399      ( 12 Jan 2019 06:46 )             32.7                         10.1   12.58 )-----------( 363      ( 11 Jan 2019 06:49 )             33.4                         9.2    10.90 )-----------( 310      ( 10 Wei 2019 07:20 )             30.1     12 Jan 2019 06:46    139    |  105    |  18     ----------------------------<  96     3.9     |  27     |  4.20   11 Jan 2019 06:49    140    |  103    |  9      ----------------------------<  146    3.4     |  30     |  3.10   10 Wei 2019 07:20    138    |  102    |  16     ----------------------------<  94     3.6     |  29     |  4.40     Ca    7.7        12 Jan 2019 06:46  Ca    7.8        11 Jan 2019 06:49  Ca    7.4        10 Wei 2019 07:20    TPro  6.7    /  Alb  1.2    /  TBili  0.3    /  DBili  .10    /  AST  166    /  ALT  57     /  AlkPhos  198    12 Jan 2019 06:46  TPro  6.1    /  Alb  1.1    /  TBili  0.4    /  DBili  x      /  AST  106    /  ALT  38     /  AlkPhos  228    10 Wei 2019 07:20    < from: TTE Echo Doppler w/o Cont (05.04.18 @ 20:56) >     EXAM:  ECHO TTE W/O CON COMP W/DOPPLR         PROCEDURE DATE:  05/04/2018        INTERPRETATION:  Height 165cm. weight 95kg. blood pressure 129/81.   Technician TE. Indication for study dyspnea.    Aortic root 2.3 cm left atrium 4.4 cm left ventricular end-diastolic   diameter 5.7 cm left ventricular end-systolic diameter 4.3 cm septal wall   thickness 1.2 cm posterior wall thickness 1.2 cm visually equivocally   estimated ejection fraction 50-55%.    The aortic root is calcified and of normaldiameter. 3 distinct leaflets of the aortic valve are not well demonstrated by this study. The   technician was unable to identify any significant gradient across this   valve to suggest hemodynamically significant aortic stenosis. Left atrium   isenlarged. The left ventricle was difficult to visualize by all   standard echocardiographic windows. Possibly the end-diastolic diameter   is mildly increased. The wall thickness is borderline increased. Any   significant focal wall motion abnormality cannot be ascertained by this   study. Visually estimated ejection fraction 50-55%. The mitral annulus is   calcified. The mitral valve leaflets are mildly thickened with a normal   EF slope and excursion. There is no evidence for hemodynamically   significant mitral stenosis. On the very limited color flow Doppler   portion examination mild mitral regurgitation suggested.    Conclusion:  1. Technically difficult limited supine study. Please note that this is a   portable study and the study is also limited due to patient's body   habitus.  2. 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  3. Enlarged left atrium with associated mild mitral regurgitation.  4. Very limited visualization left ventricle which may represent mild   left ventricular concentric hypertrophy with a well-preserved ejection   fraction as described above.  5. A very small posterior pericardial effusion is suggested but not   diagnostic.  6. Correlate these limited findings clinically.    SALVADOR PHILLIPS M.D., ATTENDING CARDIOLOGIST  This document has been electronically signed. May  5 2018  9:47AM    < end of copied text >  < from: CT Chest w/ IV Cont (01.02.19 @ 15:04) >    EXAM:  CT ABDOMEN AND PELVIS IC                          EXAM:  CT CHEST IC                          PROCEDURE DATE:  01/02/2019      INTERPRETATION:  .    CLINICAL INFORMATION: Lower chest pain.    TECHNIQUE: Helical axial images were obtained from the thoracic inlet to   the pubic symphysis. 95 mls of Omnipaque-350 was administered   intravenously without complication and 5 mls were discarded. Oral   contrast was not administered. Sagittal and coronal reconstructed images   were obtained from the source data.    COMPARISON: Most recent prior CT examination of the abdomen and pelvis   from 11/26/2018. Prior MRI examination of the abdomen from 11/27/2018.   Prior chest, abdomen, and pelvis CT examination from 10/29/2018.    FINDINGS: Scattered subcentimeter sized lymph nodes are noted within the   axillary regions, mediastinum, and hilar areas.    The heart size is enlarged. The pericardium appears unremarkable. There   is a right sided dialysis catheter with its tip at the right atrium. A   left-sided IJ central line is noted with the tip at the SVC. No filling   defects are notable within the pulmonary arterial vessels. There are   atherosclerotic calcifications of the imaged coronary arteries, aorta,   and branch vessels. The imaged portions of the aorta are normal in   caliber.    The central airways are patent. Patchy groundglass opacities are notable   throughout both lungs with resultant mosaic attenuation. Scattered areas   of linear atelectasis are notable within the bilateral lung bases, left   upper lobe, and lingula.    There is redemonstration of nonspecific gallbladder wall thickening   and/or edema and/or fat deposition. Sludge and/or stones are noted within   the lumen of the gallbladder. A cholecystostomy tube tract is notable   along the right upper anterior abdominal wall. A small amount of   pneumobilia is noted, compatible with stent patency. The common bile duct   remains dilated and measures up to 1.2 cm. A common bile duct stent is   again noted. No radiopaque stones are notable adjacent to the stent.   Previously noted abscesses within the liver appear less conspicuous   compared to the prior MRI and CT examinations. A small low-attenuation   focus is noted within the central hepatic area measuring up to 8 mm   (series 2, image 114).     The pancreas, spleen, and adrenal glands appear unremarkable.    There is atrophy of the bilateral kidneys. A left-sided renal cyst   appears unchanged. There is minimal nonspecific bilateral perinephric   thickening. There is no hydronephrosis bilaterally. Arterial vascular   calcifications are noted in the vicinity of the bilateral renal   collecting systems. The urinary bladder appears unremarkable.    There are multiple scattered nonspecificsubcentimeter retroperitoneal   and mesenteric lymph nodes.    There is no bowel obstruction or abdominal ascites. A duodenal   diverticulum appears unchanged. Gas and stool are notable throughout the   large bowel loops. The appendix is normal.    The patient is status post hysterectomy. No adnexal masses are noted.    Multilevel degenerative changes notable within the imaged spine. There is   a moderate compression deformity of the L2 vertebral body which appears   unchanged. There is also a mild superior endplate deformity of the T12   vertebral body which also appears unchanged.    Areas of mixed lucency and sclerosis adjacent to the bilateral SI joints   appear unchanged. Mild diffuse osteosclerosis may be compatible with   early renal osteodystrophy.    IMPRESSION:    CT chest:  1. Groundglass mosaic attenuation to the lungs which may reflect small   airways or small vessel disease.    CT abdomen and pelvis:  1. Chronic cholecystitis, as seen on prior exams. A superimposed acute   component cannot be completely excluded.  2. Redemonstration of a common bile duct stent with pneumobilia,   compatible with stent patency.  3. Interval decreased conspicuity of previous noted abscesses throughout   the liver with a small abscess remaining, as discussed.  4. Other chronic nonemergent findings, as discussed.  CARLITO BEE M.D., ATTENDING RADIOLOGIST  This document has been electronically signed. Jan 2 2019  3:30PM    < end of copied text >

## 2019-01-13 LAB
ANION GAP SERPL CALC-SCNC: 6 MMOL/L — SIGNIFICANT CHANGE UP (ref 5–17)
BUN SERPL-MCNC: 9 MG/DL — SIGNIFICANT CHANGE UP (ref 7–23)
CALCIUM SERPL-MCNC: 7.9 MG/DL — LOW (ref 8.5–10.1)
CHLORIDE SERPL-SCNC: 105 MMOL/L — SIGNIFICANT CHANGE UP (ref 96–108)
CO2 SERPL-SCNC: 30 MMOL/L — SIGNIFICANT CHANGE UP (ref 22–31)
CREAT SERPL-MCNC: 2.9 MG/DL — HIGH (ref 0.5–1.3)
GLUCOSE SERPL-MCNC: 112 MG/DL — HIGH (ref 70–99)
HCT VFR BLD CALC: 31.8 % — LOW (ref 34.5–45)
HGB BLD-MCNC: 9.7 G/DL — LOW (ref 11.5–15.5)
MCHC RBC-ENTMCNC: 28.7 PG — SIGNIFICANT CHANGE UP (ref 27–34)
MCHC RBC-ENTMCNC: 30.5 GM/DL — LOW (ref 32–36)
MCV RBC AUTO: 94.1 FL — SIGNIFICANT CHANGE UP (ref 80–100)
NRBC # BLD: 0 /100 WBCS — SIGNIFICANT CHANGE UP (ref 0–0)
PLATELET # BLD AUTO: 408 K/UL — HIGH (ref 150–400)
POTASSIUM SERPL-MCNC: 3.9 MMOL/L — SIGNIFICANT CHANGE UP (ref 3.5–5.3)
POTASSIUM SERPL-SCNC: 3.9 MMOL/L — SIGNIFICANT CHANGE UP (ref 3.5–5.3)
RBC # BLD: 3.38 M/UL — LOW (ref 3.8–5.2)
RBC # FLD: 20.2 % — HIGH (ref 10.3–14.5)
SODIUM SERPL-SCNC: 141 MMOL/L — SIGNIFICANT CHANGE UP (ref 135–145)
WBC # BLD: 13.27 K/UL — HIGH (ref 3.8–10.5)
WBC # FLD AUTO: 13.27 K/UL — HIGH (ref 3.8–10.5)

## 2019-01-13 PROCEDURE — 99233 SBSQ HOSP IP/OBS HIGH 50: CPT

## 2019-01-13 PROCEDURE — 99232 SBSQ HOSP IP/OBS MODERATE 35: CPT

## 2019-01-13 RX ADMIN — Medication 81 MILLIGRAM(S): at 12:29

## 2019-01-13 RX ADMIN — HEPARIN SODIUM 5000 UNIT(S): 5000 INJECTION INTRAVENOUS; SUBCUTANEOUS at 21:57

## 2019-01-13 RX ADMIN — Medication 500 MILLIGRAM(S): at 15:15

## 2019-01-13 RX ADMIN — Medication 1: at 12:30

## 2019-01-13 RX ADMIN — HEPARIN SODIUM 5000 UNIT(S): 5000 INJECTION INTRAVENOUS; SUBCUTANEOUS at 15:15

## 2019-01-13 RX ADMIN — ISOSORBIDE MONONITRATE 30 MILLIGRAM(S): 60 TABLET, EXTENDED RELEASE ORAL at 21:56

## 2019-01-13 RX ADMIN — LIDOCAINE 1 PATCH: 4 CREAM TOPICAL at 23:18

## 2019-01-13 RX ADMIN — GABAPENTIN 300 MILLIGRAM(S): 400 CAPSULE ORAL at 21:57

## 2019-01-13 RX ADMIN — LIDOCAINE 1 PATCH: 4 CREAM TOPICAL at 12:30

## 2019-01-13 RX ADMIN — CHOLESTYRAMINE 4 GRAM(S): 4 POWDER, FOR SUSPENSION ORAL at 21:56

## 2019-01-13 RX ADMIN — ISOSORBIDE MONONITRATE 60 MILLIGRAM(S): 60 TABLET, EXTENDED RELEASE ORAL at 12:29

## 2019-01-13 RX ADMIN — Medication 250 MILLIGRAM(S): at 21:56

## 2019-01-13 RX ADMIN — PANTOPRAZOLE SODIUM 40 MILLIGRAM(S): 20 TABLET, DELAYED RELEASE ORAL at 10:21

## 2019-01-13 RX ADMIN — Medication 1 TABLET(S): at 17:05

## 2019-01-13 RX ADMIN — Medication 50 MILLIGRAM(S): at 10:21

## 2019-01-13 RX ADMIN — CHOLESTYRAMINE 4 GRAM(S): 4 POWDER, FOR SUSPENSION ORAL at 10:21

## 2019-01-13 RX ADMIN — ATORVASTATIN CALCIUM 80 MILLIGRAM(S): 80 TABLET, FILM COATED ORAL at 21:56

## 2019-01-13 RX ADMIN — LIDOCAINE 1 PATCH: 4 CREAM TOPICAL at 01:00

## 2019-01-13 RX ADMIN — Medication 1 TABLET(S): at 12:29

## 2019-01-13 RX ADMIN — Medication 2: at 17:05

## 2019-01-13 RX ADMIN — Medication 60 MILLIGRAM(S): at 10:21

## 2019-01-13 RX ADMIN — Medication 1 TABLET(S): at 10:21

## 2019-01-13 RX ADMIN — Medication 500 MILLIGRAM(S): at 21:57

## 2019-01-13 RX ADMIN — Medication 500 MILLIGRAM(S): at 10:21

## 2019-01-13 NOTE — PROGRESS NOTE ADULT - PROBLEM SELECTOR PROBLEM 8
Elevated troponin level

## 2019-01-13 NOTE — PROGRESS NOTE ADULT - PROBLEM SELECTOR PLAN 7
Continue home dose metoprolol, nifedipine with hold parameters
Continue home dose metoprolol, nifedipine with hold parameters.
Continue home dose metoprolol, nifedipine with hold parameters
Continue home dose metoprolol, nifedipine with hold parameters.
Continue home dose metoprolol, nifedipine with hold parameters.

## 2019-01-13 NOTE — PROGRESS NOTE ADULT - ASSESSMENT
69 yo F with PMH of ESRD on HD (M, W, F) via permacath started in June 2018, CAD s/p 1 stent 2007, DM type 2, HTN, MI, biliary stent, Liver abscess, obesity who presents to the ED with abdominal pain x2 days, 4 episodes of vomiting. Currently admitted for sepsis 2/2 acute on chronic cholecystitis vs failed treatment of liver abscess. Patient had recent admission at [facility] November 2018 for acute cholecystitis causing sepsis, S/P Perc Kevin 10/30 and ESBL E. coli bacteremia. She was treated with IV ertapenem and developed A fIb with RVR. ERCP was attempted at [facility] but was unsuccessful and was transferred to [facility] where ERCP was performed with stone extraction and stent placement. She also had another admission in November 2018 at [place] shortly after for acute pancreatitis. Patient has been in rehab s/p last discharge from [facility].     - Continue ASA, statin, Imdur   - No need to resume Plavix as her stent is remote  - Follow Surgery recs re: open kevin and CBD stent removal, no plan at present per GI  - No acute changes on EKG compared to previous.    - BP more controlled, would increase procardia XL if needed  - Continue metoprolol 50 mg q12H  - HD per renal.  Monitor volume status  - Abx per ID for liver abscess  - Other cardiovascular workup will depend on clinical course.  - All other workup per primary team.  - Will continue to follow.    Lenore Long NP  Cardiology

## 2019-01-13 NOTE — PROGRESS NOTE ADULT - PROBLEM SELECTOR PROBLEM 5
CAD (coronary artery disease)

## 2019-01-13 NOTE — PROGRESS NOTE ADULT - PROBLEM SELECTOR PLAN 6
HbA1c 6%  - Hold home medications: basaglar 7u QHS  - Hypoglycemia Protocol, Low Dose Insulin Sliding Coverage, Accuchecks AC&HS.
HbA1c 6%  - Hold home medications: basaglar 7u QHS  - Hypoglycemia Protocol, Low Dose Insulin Sliding Coverage, Accuchecks AC&HS.  - Diet currently NPO, will resume consistent carb/DASH diet
HbA1c 6%  - Hold home medications: basaglar 7u QHS  - Hypoglycemia Protocol, Low Dose Insulin Sliding Coverage, Accuchecks AC&HS.  - Diet currently NPO, will resume consistent carb/DASH diet
HbA1c 6%  - Hold home medications: basaglar 7u QHS  - Hypoglycemia Protocol, Low Dose Insulin Sliding Coverage, Accuchecks AC&HS.

## 2019-01-13 NOTE — PROGRESS NOTE ADULT - NSHPATTENDINGPLANDISCUSS_GEN_ALL_CORE
pt and Dr Carreon about her Abx therapy
pt and Dr Joshi about her plan of discharge.
pt, consultants, nursing
pt, pt's family, consultants, nursing
pt, pt's family, consultants, intern/resident
patient family, resident, Dr. Campos, consults
pt, pt's family, consultants, intern/resident
pt, pt's family, consultants, intern/resident

## 2019-01-13 NOTE — PROGRESS NOTE ADULT - SUBJECTIVE AND OBJECTIVE BOX
Subjective: Pt tolerating po, without complaints.    Objective:  Vital Signs Last 24 Hrs  T(C): 36.7 (13 Jan 2019 12:00), Max: 36.9 (12 Jan 2019 15:22)  T(F): 98 (13 Jan 2019 12:00), Max: 98.5 (12 Jan 2019 15:22)  HR: 71 (13 Jan 2019 12:00) (70 - 96)  BP: 146/71 (13 Jan 2019 12:00) (113/70 - 146/71)  BP(mean): --  RR: 18 (13 Jan 2019 12:00) (17 - 18)  SpO2: 99% (13 Jan 2019 12:00) (99% - 99%)    Heent: QUENTIN, DONTE  Pul: decreased at bases  Cor: RSR, without murmurs  Abdomen: normal bowel sounds, without distension or tenderness  Extremities: without edema, motor/sensory intact, without calf pain, Homans sign negative.                        9.7    13.27 )-----------( 408      ( 13 Jan 2019 07:21 )             31.8       01-13    141  |  105  |  9   ----------------------------<  112<H>  3.9   |  30  |  2.90<H>    Ca    7.9<L>      13 Jan 2019 07:21    TPro  6.7  /  Alb  1.2<L>  /  TBili  0.3  /  DBili  .10  /  AST  166<H>  /  ALT  57  /  AlkPhos  198<H>  01-12 01-12 @ 07:01  -  01-13 @ 07:00  --------------------------------------------------------  IN:  Total IN: 0 mL    OUT:    Other: 1500 mL  Total OUT: 1500 mL    Total NET: -1500 mL

## 2019-01-13 NOTE — PROGRESS NOTE ADULT - SUBJECTIVE AND OBJECTIVE BOX
[facility] -- Glynn Bullock, Claudia Davis Pannella, Patel, Savella  Office # [phone]    Follow Up:  CAD, elevated trops    Subjective/Observations: Sitting on the chair.  Denies respiratory discomfort, CP, ort palpitations.  However, c/o nausea after taking meds.  No vomiting    REVIEW OF SYSTEMS: All other review of systems is negative unless indicated above    PAST MEDICAL & SURGICAL HISTORY:  Cholecystitis with cholangitis  Acute urinary retention  Liver abscess  ESRD (end stage renal disease) on dialysis: MWF  CAD (coronary artery disease): s/p stent in 2007  Diabetes  Myocardial infarction  Hypertension  H/O: hysterectomy      MEDICATIONS  (STANDING):  aspirin enteric coated 81 milliGRAM(s) Oral daily  atorvastatin 80 milliGRAM(s) Oral at bedtime  cholestyramine Powder (Sugar-Free) 4 Gram(s) Oral two times a day  ciprofloxacin     Tablet 250 milliGRAM(s) Oral at bedtime  dextrose 5%. 1000 milliLiter(s) (50 mL/Hr) IV Continuous <Continuous>  dextrose 50% Injectable 12.5 Gram(s) IV Push once  dextrose 50% Injectable 25 Gram(s) IV Push once  dextrose 50% Injectable 25 Gram(s) IV Push once  epoetin analilia Injectable 69184 Unit(s) IV Push <User Schedule>  gabapentin 300 milliGRAM(s) Oral at bedtime  heparin  Injectable 5000 Unit(s) SubCutaneous every 8 hours  insulin lispro (HumaLOG) corrective regimen sliding scale   SubCutaneous three times a day before meals  insulin lispro (HumaLOG) corrective regimen sliding scale   SubCutaneous at bedtime  isosorbide   mononitrate ER Tablet (IMDUR) 30 milliGRAM(s) Oral at bedtime  isosorbide   mononitrate ER Tablet (IMDUR) 60 milliGRAM(s) Oral daily  lactobacillus acidophilus 1 Tablet(s) Oral three times a day with meals  lidocaine   Patch 1 Patch Transdermal daily  metoprolol tartrate 50 milliGRAM(s) Oral two times a day  metroNIDAZOLE    Tablet 500 milliGRAM(s) Oral every 8 hours  NIFEdipine XL 60 milliGRAM(s) Oral daily  pantoprazole    Tablet 40 milliGRAM(s) Oral before breakfast    MEDICATIONS  (PRN):  bismuth subsalicylate Liquid 30 milliLiter(s) Oral three times a day PRN Diarrhea  dextrose 40% Gel 15 Gram(s) Oral once PRN Blood Glucose LESS THAN 70 milliGRAM(s)/deciliter  glucagon  Injectable 1 milliGRAM(s) IntraMuscular once PRN Glucose LESS THAN 70 milligrams/deciliter      Allergies    ertapenem (Urticaria)  Purell (Rash)    Intolerances    Vital Signs Last 24 Hrs  T(C): 36.7 (13 Jan 2019 12:00), Max: 36.9 (12 Jan 2019 15:22)  T(F): 98 (13 Jan 2019 12:00), Max: 98.5 (12 Jan 2019 15:22)  HR: 71 (13 Jan 2019 12:00) (70 - 96)  BP: 146/71 (13 Jan 2019 12:00) (113/70 - 146/71)  BP(mean): --  RR: 18 (13 Jan 2019 12:00) (17 - 18)  SpO2: 99% (13 Jan 2019 12:00) (99% - 99%)    I&O's Summary    12 Jan 2019 07:01  -  13 Jan 2019 07:00  --------------------------------------------------------  IN: 0 mL / OUT: 1500 mL / NET: -1500 mL    PHYSICAL EXAM:  TELE: Not on tele  Constitutional: NAD, awake and alert, obese  HEENT: Moist Mucous Membranes, Anicteric  Pulmonary: Non-labored, breath sounds are diminished bilaterally, No wheezing, rales or rhonchi  Cardiovascular: Regular, S1 and S2, No murmurs, rubs, gallops or clicks  Gastrointestinal: Bowel Sounds present, soft, nontender.   Lymph: No peripheral edema. No lymphadenopathy.  Skin: No visible rashes or ulcers.  Psych:  Mood & affect appropriate    LABS: All Labs Reviewed:                        9.7    13.27 )-----------( 408      ( 13 Jan 2019 07:21 )             31.8                         9.8    15.95 )-----------( 399      ( 12 Jan 2019 06:46 )             32.7                         10.1   12.58 )-----------( 363      ( 11 Jan 2019 06:49 )             33.4     13 Jan 2019 07:21    141    |  105    |  9      ----------------------------<  112    3.9     |  30     |  2.90   12 Jan 2019 06:46    139    |  105    |  18     ----------------------------<  96     3.9     |  27     |  4.20   11 Jan 2019 06:49    140    |  103    |  9      ----------------------------<  146    3.4     |  30     |  3.10     Ca    7.9        13 Jan 2019 07:21  Ca    7.7        12 Jan 2019 06:46  Ca    7.8        11 Jan 2019 06:49    TPro  6.7    /  Alb  1.2    /  TBili  0.3    /  DBili  .10    /  AST  166    /  ALT  57     /  AlkPhos  198    12 Jan 2019 06:46    < from: TTE Echo Doppler w/o Cont (05.04.18 @ 20:56) >     EXAM:  ECHO TTE W/O CON COMP W/DOPPLR         PROCEDURE DATE:  05/04/2018        INTERPRETATION:  Height 165cm. weight 95kg. blood pressure 129/81.   Technician TE. Indication for study dyspnea.    Aortic root 2.3 cm left atrium 4.4 cm left ventricular end-diastolic   diameter 5.7 cm left ventricular end-systolic diameter 4.3 cm septal wall   thickness 1.2 cm posterior wall thickness 1.2 cm visually equivocally   estimated ejection fraction 50-55%.    The aortic root is calcified and of normaldiameter. 3 distinct leaflets   of the aortic valve are not well demonstrated by this study. The   technician was unable to identify any significant gradient across this   valve to suggest hemodynamically significant aortic stenosis. Left atrium   isenlarged. The left ventricle was difficult to visualize by all   standard echocardiographic windows. Possibly the end-diastolic diameter   is mildly increased. The wall thickness is borderline increased. Any   significant focal wall motion abnormality cannot be ascertained by this   study. Visually estimated ejection fraction 50-55%. The mitral annulus is   calcified. The mitral valve leaflets are mildly thickened with a normal   EF slope and excursion. There is no evidence for hemodynamically   significant mitral stenosis. On the very limited color flow Doppler   portion examination mild mitral regurgitation suggested.    Conclusion:  1. Technically difficult limited supine study. Please note that this is a   portable study and the study is also limited due to patient's body   habitus.  2. 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  3. Enlarged left atrium with associated mild mitral regurgitation.  4. Very limited visualization left ventricle which may represent mild   left ventricular concentric hypertrophy with a well-preserved ejection   fraction as described above.  5. A very small posterior pericardial effusion is suggested but not   diagnostic.  6. Correlate these limited findings clinically.      SALVADOR PHILLIPS M.D., ATTENDING CARDIOLOGIST  This document has been electronically signed. May  5 2018  9:47AM     < end of copied text >     < from: CT Chest w/ IV Cont (01.02.19 @ 15:04) >    EXAM:  CT ABDOMEN AND PELVIS IC                          EXAM:  CT CHEST IC                          PROCEDURE DATE:  01/02/2019        INTERPRETATION:  .    CLINICAL INFORMATION: Lower chest pain.    TECHNIQUE: Helical axial images were obtained from the thoracic inlet to   the pubic symphysis. 95 mls of Omnipaque-350 was administered   intravenously without complication and 5 mls were discarded. Oral   contrast was not administered. Sagittal and coronal reconstructed images   were obtained from the source data.    COMPARISON: Most recent prior CT examination of the abdomen and pelvis   from 11/26/2018. Prior MRI examination of the abdomen from 11/27/2018.   Prior chest, abdomen, and pelvis CT examination from 10/29/2018.    FINDINGS: Scattered subcentimeter sized lymph nodes are noted within the   axillary regions, mediastinum, and hilar areas.    The heart size is enlarged. The pericardium appears unremarkable. There   is a right sided dialysis catheter with its tip at the right atrium. A   left-sided IJ central line is noted with the tip at the SVC. No filling   defects are notable within the pulmonary arterial vessels. There are   atherosclerotic calcifications of the imaged coronary arteries, aorta,   and branch vessels. The imaged portions of the aorta are normal in   caliber.    The central airways are patent. Patchy groundglass opacities are notable   throughout both lungs with resultant mosaic attenuation. Scattered areas   of linear atelectasis are notable within the bilateral lung bases, left   upper lobe, and lingula.    There is redemonstration of nonspecific gallbladder wall thickening   and/or edema and/or fat deposition. Sludge and/or stones are noted within   the lumen of the gallbladder. A cholecystostomy tube tract is notable   along the right upper anterior abdominal wall. A small amount of   pneumobilia is noted, compatible with stent patency. The common bile duct   remains dilated and measures up to 1.2 cm. A common bile duct stent is   again noted. No radiopaque stones are notable adjacent to the stent.   Previously noted abscesses within the liver appear less conspicuous   compared to the prior MRI and CT examinations. A small low-attenuation   focus is noted within the central hepatic area measuring up to 8 mm   (series 2, image 114).     The pancreas, spleen, and adrenal glands appear unremarkable.    There is atrophy of the bilateral kidneys. A left-sided renal cyst   appears unchanged. There is minimal nonspecific bilateral perinephric   thickening. There is no hydronephrosis bilaterally. Arterial vascular   calcifications are noted in the vicinity of the bilateral renal   collecting systems. The urinary bladder appears unremarkable.    There are multiple scattered nonspecificsubcentimeter retroperitoneal   and mesenteric lymph nodes.    There is no bowel obstruction or abdominal ascites. A duodenal   diverticulum appears unchanged. Gas and stool are notable throughout the   large bowel loops. The appendix is normal.    The patient is status post hysterectomy. No adnexal masses are noted.    Multilevel degenerative changes notable within the imaged spine. There is   a moderate compression deformity of the L2 vertebral body which appears   unchanged. There is also a mild superior endplate deformity of the T12   vertebral body which also appears unchanged.    Areas of mixed lucency and sclerosis adjacent to the bilateral SI joints   appear unchanged. Mild diffuse osteosclerosis may be compatible with   early renal osteodystrophy.    IMPRESSION:    CT chest:  1. Groundglass mosaic attenuation to the lungs which may reflect small   airways or small vessel disease.    CT abdomen and pelvis:  1. Chronic cholecystitis, as seen on prior exams. A superimposed acute   component cannot be completely excluded.  2. Redemonstration of a common bile duct stent with pneumobilia,   compatible with stent patency.  3. Interval decreased conspicuity of previous noted abscesses throughout   the liver with a small abscess remaining, as discussed.  4. Other chronic nonemergent findings, as discussed.    CARLITO BEE M.D., ATTENDING RADIOLOGIST  This document has been electronically signed. Jan 2 2019  3:30PM      < end of copied text >

## 2019-01-13 NOTE — PROGRESS NOTE ADULT - PROBLEM SELECTOR PROBLEM 7
Hypertension

## 2019-01-13 NOTE — PROGRESS NOTE ADULT - SUBJECTIVE AND OBJECTIVE BOX
INTERVAL HPI/OVERNIGHT EVENTS:  pt seen and examined.  denies n/v/abd pain  tolerating diet     MEDICATIONS  (STANDING):  aspirin enteric coated 81 milliGRAM(s) Oral daily  atorvastatin 80 milliGRAM(s) Oral at bedtime  cholestyramine Powder (Sugar-Free) 4 Gram(s) Oral two times a day  ciprofloxacin     Tablet 250 milliGRAM(s) Oral at bedtime  dextrose 5%. 1000 milliLiter(s) (50 mL/Hr) IV Continuous <Continuous>  dextrose 50% Injectable 12.5 Gram(s) IV Push once  dextrose 50% Injectable 25 Gram(s) IV Push once  dextrose 50% Injectable 25 Gram(s) IV Push once  epoetin analilia Injectable 96929 Unit(s) IV Push <User Schedule>  gabapentin 300 milliGRAM(s) Oral at bedtime  heparin  Injectable 5000 Unit(s) SubCutaneous every 8 hours  insulin lispro (HumaLOG) corrective regimen sliding scale   SubCutaneous three times a day before meals  insulin lispro (HumaLOG) corrective regimen sliding scale   SubCutaneous at bedtime  isosorbide   mononitrate ER Tablet (IMDUR) 30 milliGRAM(s) Oral at bedtime  isosorbide   mononitrate ER Tablet (IMDUR) 60 milliGRAM(s) Oral daily  lactobacillus acidophilus 1 Tablet(s) Oral three times a day with meals  lidocaine   Patch 1 Patch Transdermal daily  metoprolol tartrate 50 milliGRAM(s) Oral two times a day  metroNIDAZOLE    Tablet 500 milliGRAM(s) Oral every 8 hours  NIFEdipine XL 60 milliGRAM(s) Oral daily  pantoprazole    Tablet 40 milliGRAM(s) Oral before breakfast  potassium chloride    Tablet ER 40 milliEquivalent(s) Oral once    MEDICATIONS  (PRN):  bismuth subsalicylate Liquid 30 milliLiter(s) Oral three times a day PRN Diarrhea  dextrose 40% Gel 15 Gram(s) Oral once PRN Blood Glucose LESS THAN 70 milliGRAM(s)/deciliter  glucagon  Injectable 1 milliGRAM(s) IntraMuscular once PRN Glucose LESS THAN 70 milligrams/deciliter      Allergies    ertapenem (Urticaria)  Purell (Rash)    Intolerances        Review of Systems:    General:  No wt loss, fevers, chills, night sweats, fatigue   Eyes:  Good vision, no reported pain  ENT:  No sore throat, pain, runny nose, dysphagia  CV:  No pain, palpitations, hypo/hypertension  Resp:  No dyspnea, cough, tachypnea, wheezing  GI:  No pain, No nausea, No vomiting, No diarrhea, No constipation, No weight loss, No fever, No pruritis, No rectal bleeding, No melena, No dysphagia  :  No pain, bleeding, incontinence, nocturia  Muscle:  No pain, weakness  Neuro:  No weakness, tingling, memory problems  Psych:  No fatigue, insomnia, mood problems, depression  Endocrine:  No polyuria, polydypsia, cold/heat intolerance  Heme:  No petechiae, ecchymosis, easy bruisability  Skin:  No rash, tattoos, scars, edema      Vital Signs Last 24 Hrs  T(C): 36.7 (13 Jan 2019 05:00), Max: 36.9 (12 Jan 2019 15:22)  T(F): 98 (13 Jan 2019 05:00), Max: 98.5 (12 Jan 2019 15:22)  HR: 70 (13 Jan 2019 05:00) (70 - 96)  BP: 126/65 (13 Jan 2019 05:00) (113/70 - 146/57)  BP(mean): --  RR: 18 (13 Jan 2019 05:00) (17 - 18)  SpO2: 99% (13 Jan 2019 05:00) (99% - 100%)    PHYSICAL EXAM:    Constitutional: NAD  HEENT: ncat  Neck: No LAD  Respiratory: dec bs  Cardiovascular: S1 and S2, RRR  Gastrointestinal: obese soft nt nd  Extremities: trace edema  Vascular: 2+ peripheral pulses  Neurological: awake alert  Skin: No rashes    LABS:                                                                 9.7    13.27 )-----------( 408      ( 13 Jan 2019 07:21 )             31.8   01-13    141  |  105  |  9   ----------------------------<  112<H>  3.9   |  30  |  2.90<H>    Ca    7.9<L>      13 Jan 2019 07:21    TPro  6.7  /  Alb  1.2<L>  /  TBili  0.3  /  DBili  .10  /  AST  166<H>  /  ALT  57  /  AlkPhos  198<H>  01-12    RADIOLOGY & ADDITIONAL TESTS:

## 2019-01-13 NOTE — PROGRESS NOTE ADULT - PROBLEM SELECTOR PLAN 8
Slight elevation in setting of ESRD and sepsis; Trended down, 3rd set wnl (.041).  - f/u Cardio ([facility]).  - No suspicion for ACS.
Trended down, 3rd set wnl (.041).  - f/u Cardio ([facility]).
slight elevation in setting of ESRD and sepsis; Trended down, 3rd set wnl (.041).  - f/u Cardio ([facility]).  - no suspicion for ACS
Slight elevation in setting of ESRD and sepsis; Trended down, 3rd set wnl (.041).  - f/u Cardio ([facility]).  - No suspicion for ACS.

## 2019-01-13 NOTE — PROGRESS NOTE ADULT - PROBLEM SELECTOR PLAN 9
Continue Heparin 5,000 q8 for VTE ppx.
Continue Heparin 5,000 q8 for VTE ppx.
Continue Heparin 5,000 q8 for VTE ppx.   IMPROVE VTE Score: 1. Padua Score: 3.
Continue Heparin 5,000 q8 for VTE ppx.   IMPROVE VTE Score: 1. Padua Score: 3.
Continue Heparin 5,000 q8h for VTE ppx.

## 2019-01-13 NOTE — PROGRESS NOTE ADULT - SUBJECTIVE AND OBJECTIVE BOX
Patient is a 69y old  Female who presents with a chief complaint of abd pain, vomiting (13 Jan 2019 10:42)      INTERVAL HPI/OVERNIGHT EVENTS: 67yo F with PMH of ESRD on HD (M, W, F), CAD s/p stents 2007, Type 2 DM, HTN, MI, recent admission with ascending cholangitis with ESBL E coli bacteremia s/p biliary stent, recent admission with liver abscesses (on Tigecycline), who presents to the ED with abdominal pain x2 days, 4 episodes of vomiting, admitted for sepsis due to suspected acute on chronic cholecystitis found to have Klebsiella bacteremia.     MEDICATIONS  (STANDING):  aspirin enteric coated 81 milliGRAM(s) Oral daily  atorvastatin 80 milliGRAM(s) Oral at bedtime  cholestyramine Powder (Sugar-Free) 4 Gram(s) Oral two times a day  ciprofloxacin     Tablet 250 milliGRAM(s) Oral at bedtime  dextrose 5%. 1000 milliLiter(s) (50 mL/Hr) IV Continuous <Continuous>  dextrose 50% Injectable 12.5 Gram(s) IV Push once  dextrose 50% Injectable 25 Gram(s) IV Push once  dextrose 50% Injectable 25 Gram(s) IV Push once  epoetin analilia Injectable 42262 Unit(s) IV Push <User Schedule>  gabapentin 300 milliGRAM(s) Oral at bedtime  heparin  Injectable 5000 Unit(s) SubCutaneous every 8 hours  insulin lispro (HumaLOG) corrective regimen sliding scale   SubCutaneous three times a day before meals  insulin lispro (HumaLOG) corrective regimen sliding scale   SubCutaneous at bedtime  isosorbide   mononitrate ER Tablet (IMDUR) 30 milliGRAM(s) Oral at bedtime  isosorbide   mononitrate ER Tablet (IMDUR) 60 milliGRAM(s) Oral daily  lactobacillus acidophilus 1 Tablet(s) Oral three times a day with meals  lidocaine   Patch 1 Patch Transdermal daily  metoprolol tartrate 50 milliGRAM(s) Oral two times a day  metroNIDAZOLE    Tablet 500 milliGRAM(s) Oral every 8 hours  NIFEdipine XL 60 milliGRAM(s) Oral daily  pantoprazole    Tablet 40 milliGRAM(s) Oral before breakfast    MEDICATIONS  (PRN):  bismuth subsalicylate Liquid 30 milliLiter(s) Oral three times a day PRN Diarrhea  dextrose 40% Gel 15 Gram(s) Oral once PRN Blood Glucose LESS THAN 70 milliGRAM(s)/deciliter  glucagon  Injectable 1 milliGRAM(s) IntraMuscular once PRN Glucose LESS THAN 70 milligrams/deciliter      Allergies    ertapenem (Urticaria)  Purell (Rash)    Intolerances        REVIEW OF SYSTEMS:  CONSTITUTIONAL: No fever, weight loss, or fatigue  EYES: No eye pain, visual disturbances, or discharge  ENMT:  No difficulty hearing, tinnitus, vertigo; No sinus or throat pain  NECK: No pain or stiffness  BREASTS: No pain, masses, or nipple discharge  RESPIRATORY: No cough, wheezing, chills or hemoptysis; No shortness of breath  CARDIOVASCULAR: No chest pain, palpitations, dizziness, or leg swelling  GASTROINTESTINAL: No abdominal or epigastric pain. No nausea, vomiting, or hematemesis; No diarrhea or constipation. No melena or hematochezia.  GENITOURINARY: No dysuria, frequency, hematuria, or incontinence  NEUROLOGICAL: No headaches, memory loss, loss of strength, numbness, or tremors  SKIN: No itching, burning, rashes, or lesions   LYMPH NODES: No enlarged glands  ENDOCRINE: No heat or cold intolerance; No hair loss; No polydipsia or polyuria  MUSCULOSKELETAL: No joint pain or swelling; No muscle, back, or extremity pain  PSYCHIATRIC: No depression, anxiety, mood swings, or difficulty sleeping  HEME/LYMPH: No easy bruising, or bleeding gums  ALLERGY AND IMMUNOLOGIC: No hives or eczema    Vital Signs Last 24 Hrs  T(C): 36.7 (13 Jan 2019 12:00), Max: 36.9 (12 Jan 2019 15:22)  T(F): 98 (13 Jan 2019 12:00), Max: 98.5 (12 Jan 2019 15:22)  HR: 71 (13 Jan 2019 12:00) (70 - 96)  BP: 146/71 (13 Jan 2019 12:00) (113/70 - 146/71)  BP(mean): --  RR: 18 (13 Jan 2019 12:00) (17 - 18)  SpO2: 99% (13 Jan 2019 12:00) (99% - 99%)    PHYSICAL EXAM:  GENERAL: NAD, well-groomed, well-developed  HEAD:  Atraumatic, Normocephalic  EYES: EOMI, PERRLA, conjunctiva and sclera clear  ENMT: No tonsillar erythema, exudates, or enlargement; Moist mucous membranes, Good dentition, No lesions  NECK: Supple, No JVD, Normal thyroid  NERVOUS SYSTEM:  Alert & Oriented X3, Good concentration; Motor Strength 5/5 B/L upper and lower extremities; DTRs 2+ intact and symmetric  CHEST/LUNG: Clear to auscultation bilaterally; No rales, rhonchi, wheezing, or rubs  HEART: Regular rate and rhythm; No murmurs, rubs, or gallops  ABDOMEN: Soft, Nontender, Nondistended; Bowel sounds present  EXTREMITIES:  2+ Peripheral Pulses, No clubbing, cyanosis, or edema  LYMPH: No lymphadenopathy noted  SKIN: No rashes or lesions    LABS:                        9.7    13.27 )-----------( 408      ( 13 Jan 2019 07:21 )             31.8     13 Jan 2019 07:21    141    |  105    |  9      ----------------------------<  112    3.9     |  30     |  2.90     Ca    7.9        13 Jan 2019 07:21          CAPILLARY BLOOD GLUCOSE      POCT Blood Glucose.: 182 mg/dL (13 Jan 2019 12:20)  POCT Blood Glucose.: 111 mg/dL (13 Jan 2019 08:20)  POCT Blood Glucose.: 101 mg/dL (12 Jan 2019 22:05)  POCT Blood Glucose.: 210 mg/dL (12 Jan 2019 17:00)      RADIOLOGY & ADDITIONAL TESTS:    Imaging Personally Reviewed:  [ ] YES  [ ] NO    Consultant(s) Notes Reviewed:  [ ] YES  [ ] NO    Care Discussed with Consultants/Other Providers [ ] YES  [ ] NO

## 2019-01-14 VITALS
DIASTOLIC BLOOD PRESSURE: 72 MMHG | TEMPERATURE: 98 F | RESPIRATION RATE: 18 BRPM | HEART RATE: 84 BPM | SYSTOLIC BLOOD PRESSURE: 135 MMHG | OXYGEN SATURATION: 100 %

## 2019-01-14 LAB
ANION GAP SERPL CALC-SCNC: 7 MMOL/L — SIGNIFICANT CHANGE UP (ref 5–17)
BUN SERPL-MCNC: 18 MG/DL — SIGNIFICANT CHANGE UP (ref 7–23)
CALCIUM SERPL-MCNC: 8.1 MG/DL — LOW (ref 8.5–10.1)
CHLORIDE SERPL-SCNC: 103 MMOL/L — SIGNIFICANT CHANGE UP (ref 96–108)
CO2 SERPL-SCNC: 29 MMOL/L — SIGNIFICANT CHANGE UP (ref 22–31)
CREAT SERPL-MCNC: 4.4 MG/DL — HIGH (ref 0.5–1.3)
GLUCOSE SERPL-MCNC: 184 MG/DL — HIGH (ref 70–99)
HCT VFR BLD CALC: 32.7 % — LOW (ref 34.5–45)
HGB BLD-MCNC: 9.9 G/DL — LOW (ref 11.5–15.5)
MCHC RBC-ENTMCNC: 28.9 PG — SIGNIFICANT CHANGE UP (ref 27–34)
MCHC RBC-ENTMCNC: 30.3 GM/DL — LOW (ref 32–36)
MCV RBC AUTO: 95.3 FL — SIGNIFICANT CHANGE UP (ref 80–100)
NRBC # BLD: 0 /100 WBCS — SIGNIFICANT CHANGE UP (ref 0–0)
PLATELET # BLD AUTO: 462 K/UL — HIGH (ref 150–400)
POTASSIUM SERPL-MCNC: 4.1 MMOL/L — SIGNIFICANT CHANGE UP (ref 3.5–5.3)
POTASSIUM SERPL-SCNC: 4.1 MMOL/L — SIGNIFICANT CHANGE UP (ref 3.5–5.3)
RBC # BLD: 3.43 M/UL — LOW (ref 3.8–5.2)
RBC # FLD: 20.3 % — HIGH (ref 10.3–14.5)
SODIUM SERPL-SCNC: 139 MMOL/L — SIGNIFICANT CHANGE UP (ref 135–145)
WBC # BLD: 15.39 K/UL — HIGH (ref 3.8–10.5)
WBC # FLD AUTO: 15.39 K/UL — HIGH (ref 3.8–10.5)

## 2019-01-14 PROCEDURE — 83735 ASSAY OF MAGNESIUM: CPT

## 2019-01-14 PROCEDURE — 87493 C DIFF AMPLIFIED PROBE: CPT

## 2019-01-14 PROCEDURE — 83605 ASSAY OF LACTIC ACID: CPT

## 2019-01-14 PROCEDURE — 78226 HEPATOBILIARY SYSTEM IMAGING: CPT

## 2019-01-14 PROCEDURE — 87150 DNA/RNA AMPLIFIED PROBE: CPT

## 2019-01-14 PROCEDURE — 97116 GAIT TRAINING THERAPY: CPT

## 2019-01-14 PROCEDURE — 74019 RADEX ABDOMEN 2 VIEWS: CPT

## 2019-01-14 PROCEDURE — 82550 ASSAY OF CK (CPK): CPT

## 2019-01-14 PROCEDURE — 84100 ASSAY OF PHOSPHORUS: CPT

## 2019-01-14 PROCEDURE — 87507 IADNA-DNA/RNA PROBE TQ 12-25: CPT

## 2019-01-14 PROCEDURE — 83690 ASSAY OF LIPASE: CPT

## 2019-01-14 PROCEDURE — 86704 HEP B CORE ANTIBODY TOTAL: CPT

## 2019-01-14 PROCEDURE — 74177 CT ABD & PELVIS W/CONTRAST: CPT

## 2019-01-14 PROCEDURE — 93005 ELECTROCARDIOGRAM TRACING: CPT

## 2019-01-14 PROCEDURE — 80053 COMPREHEN METABOLIC PANEL: CPT

## 2019-01-14 PROCEDURE — 87186 SC STD MICRODIL/AGAR DIL: CPT

## 2019-01-14 PROCEDURE — 86706 HEP B SURFACE ANTIBODY: CPT

## 2019-01-14 PROCEDURE — 99232 SBSQ HOSP IP/OBS MODERATE 35: CPT

## 2019-01-14 PROCEDURE — 82248 BILIRUBIN DIRECT: CPT

## 2019-01-14 PROCEDURE — 71045 X-RAY EXAM CHEST 1 VIEW: CPT

## 2019-01-14 PROCEDURE — 97161 PT EVAL LOW COMPLEX 20 MIN: CPT

## 2019-01-14 PROCEDURE — 99285 EMERGENCY DEPT VISIT HI MDM: CPT | Mod: 25

## 2019-01-14 PROCEDURE — 82553 CREATINE MB FRACTION: CPT

## 2019-01-14 PROCEDURE — 36415 COLL VENOUS BLD VENIPUNCTURE: CPT

## 2019-01-14 PROCEDURE — 85027 COMPLETE CBC AUTOMATED: CPT

## 2019-01-14 PROCEDURE — 83036 HEMOGLOBIN GLYCOSYLATED A1C: CPT

## 2019-01-14 PROCEDURE — 96365 THER/PROPH/DIAG IV INF INIT: CPT

## 2019-01-14 PROCEDURE — 99239 HOSP IP/OBS DSCHRG MGMT >30: CPT

## 2019-01-14 PROCEDURE — 87040 BLOOD CULTURE FOR BACTERIA: CPT

## 2019-01-14 PROCEDURE — 82962 GLUCOSE BLOOD TEST: CPT

## 2019-01-14 PROCEDURE — 99261: CPT

## 2019-01-14 PROCEDURE — 80048 BASIC METABOLIC PNL TOTAL CA: CPT

## 2019-01-14 PROCEDURE — 84484 ASSAY OF TROPONIN QUANT: CPT

## 2019-01-14 PROCEDURE — A9537: CPT

## 2019-01-14 PROCEDURE — 87340 HEPATITIS B SURFACE AG IA: CPT

## 2019-01-14 PROCEDURE — 80076 HEPATIC FUNCTION PANEL: CPT

## 2019-01-14 PROCEDURE — 71260 CT THORAX DX C+: CPT

## 2019-01-14 PROCEDURE — 96375 TX/PRO/DX INJ NEW DRUG ADDON: CPT

## 2019-01-14 PROCEDURE — 97530 THERAPEUTIC ACTIVITIES: CPT

## 2019-01-14 RX ORDER — NIFEDIPINE 30 MG
1 TABLET, EXTENDED RELEASE 24 HR ORAL
Qty: 0 | Refills: 0 | DISCHARGE
Start: 2019-01-14

## 2019-01-14 RX ORDER — CIPROFLOXACIN LACTATE 400MG/40ML
1 VIAL (ML) INTRAVENOUS
Qty: 0 | Refills: 0 | COMMUNITY
Start: 2019-01-14

## 2019-01-14 RX ORDER — LIDOCAINE 4 G/100G
0 CREAM TOPICAL
Qty: 0 | Refills: 0 | COMMUNITY
Start: 2019-01-14

## 2019-01-14 RX ORDER — CHOLESTYRAMINE 4 G/9G
4 POWDER, FOR SUSPENSION ORAL
Qty: 480 | Refills: 0
Start: 2019-01-14 | End: 2019-02-02

## 2019-01-14 RX ORDER — METRONIDAZOLE 500 MG
1 TABLET ORAL
Qty: 12 | Refills: 0
Start: 2019-01-14 | End: 2019-01-17

## 2019-01-14 RX ORDER — GABAPENTIN 400 MG/1
1 CAPSULE ORAL
Qty: 0 | Refills: 0 | DISCHARGE
Start: 2019-01-14

## 2019-01-14 RX ORDER — PANTOPRAZOLE SODIUM 20 MG/1
1 TABLET, DELAYED RELEASE ORAL
Qty: 0 | Refills: 0 | COMMUNITY
Start: 2019-01-14

## 2019-01-14 RX ORDER — LACTOBACILLUS ACIDOPHILUS 100MM CELL
1 CAPSULE ORAL
Qty: 90 | Refills: 0
Start: 2019-01-14 | End: 2019-02-12

## 2019-01-14 RX ORDER — LACTOBACILLUS ACIDOPHILUS 100MM CELL
1 CAPSULE ORAL
Qty: 0 | Refills: 0 | COMMUNITY
Start: 2019-01-14

## 2019-01-14 RX ORDER — PANTOPRAZOLE SODIUM 20 MG/1
1 TABLET, DELAYED RELEASE ORAL
Qty: 30 | Refills: 0
Start: 2019-01-14 | End: 2019-02-12

## 2019-01-14 RX ORDER — METOPROLOL TARTRATE 50 MG
1 TABLET ORAL
Qty: 0 | Refills: 0 | DISCHARGE
Start: 2019-01-14

## 2019-01-14 RX ORDER — METRONIDAZOLE 500 MG
1 TABLET ORAL
Qty: 0 | Refills: 0 | COMMUNITY
Start: 2019-01-14

## 2019-01-14 RX ORDER — LIDOCAINE 4 G/100G
1 CREAM TOPICAL
Qty: 10 | Refills: 0
Start: 2019-01-14 | End: 2019-01-23

## 2019-01-14 RX ORDER — HEPARIN SODIUM 5000 [USP'U]/ML
5000 INJECTION INTRAVENOUS; SUBCUTANEOUS
Qty: 0 | Refills: 0 | COMMUNITY
Start: 2019-01-14

## 2019-01-14 RX ORDER — ASPIRIN/CALCIUM CARB/MAGNESIUM 324 MG
1 TABLET ORAL
Qty: 0 | Refills: 0 | DISCHARGE
Start: 2019-01-14

## 2019-01-14 RX ORDER — CHOLESTYRAMINE 4 G/9G
0 POWDER, FOR SUSPENSION ORAL
Qty: 0 | Refills: 0 | COMMUNITY
Start: 2019-01-14

## 2019-01-14 RX ORDER — ERYTHROPOIETIN 10000 [IU]/ML
10000 INJECTION, SOLUTION INTRAVENOUS; SUBCUTANEOUS
Qty: 0 | Refills: 0 | Status: DISCONTINUED | OUTPATIENT
Start: 2019-01-14 | End: 2019-01-14

## 2019-01-14 RX ORDER — NIFEDIPINE 30 MG
90 TABLET, EXTENDED RELEASE 24 HR ORAL DAILY
Qty: 0 | Refills: 0 | Status: DISCONTINUED | OUTPATIENT
Start: 2019-01-14 | End: 2019-01-14

## 2019-01-14 RX ORDER — ISOSORBIDE MONONITRATE 60 MG/1
1 TABLET, EXTENDED RELEASE ORAL
Qty: 0 | Refills: 0 | DISCHARGE
Start: 2019-01-14

## 2019-01-14 RX ORDER — CIPROFLOXACIN LACTATE 400MG/40ML
1 VIAL (ML) INTRAVENOUS
Qty: 4 | Refills: 0
Start: 2019-01-14 | End: 2019-01-17

## 2019-01-14 RX ORDER — ATORVASTATIN CALCIUM 80 MG/1
1 TABLET, FILM COATED ORAL
Qty: 0 | Refills: 0 | DISCHARGE
Start: 2019-01-14

## 2019-01-14 RX ADMIN — LIDOCAINE 1 PATCH: 4 CREAM TOPICAL at 15:18

## 2019-01-14 RX ADMIN — Medication 500 MILLIGRAM(S): at 15:18

## 2019-01-14 RX ADMIN — Medication 2: at 17:04

## 2019-01-14 RX ADMIN — ERYTHROPOIETIN 10000 UNIT(S): 10000 INJECTION, SOLUTION INTRAVENOUS; SUBCUTANEOUS at 11:51

## 2019-01-14 RX ADMIN — ISOSORBIDE MONONITRATE 60 MILLIGRAM(S): 60 TABLET, EXTENDED RELEASE ORAL at 15:19

## 2019-01-14 RX ADMIN — Medication 60 MILLIGRAM(S): at 05:25

## 2019-01-14 RX ADMIN — Medication 1 TABLET(S): at 17:04

## 2019-01-14 RX ADMIN — HEPARIN SODIUM 5000 UNIT(S): 5000 INJECTION INTRAVENOUS; SUBCUTANEOUS at 05:25

## 2019-01-14 RX ADMIN — Medication 81 MILLIGRAM(S): at 15:19

## 2019-01-14 RX ADMIN — HEPARIN SODIUM 5000 UNIT(S): 5000 INJECTION INTRAVENOUS; SUBCUTANEOUS at 15:20

## 2019-01-14 RX ADMIN — Medication 500 MILLIGRAM(S): at 05:25

## 2019-01-14 RX ADMIN — Medication 50 MILLIGRAM(S): at 17:04

## 2019-01-14 RX ADMIN — Medication 1 TABLET(S): at 07:40

## 2019-01-14 RX ADMIN — PANTOPRAZOLE SODIUM 40 MILLIGRAM(S): 20 TABLET, DELAYED RELEASE ORAL at 05:25

## 2019-01-14 RX ADMIN — Medication 50 MILLIGRAM(S): at 05:25

## 2019-01-14 NOTE — PROGRESS NOTE ADULT - SUBJECTIVE AND OBJECTIVE BOX
CHIEF COMPLAINT: Patient is a 69y old  Female who presents with a chief complaint of abd pain, vomiting (14 Jan 2019 12:26)      HPI:  Pt is a 67 yo F with PMH of ESRD on HD (M, W, F), CAD s/p stents 2007, DM, HTN, MI, biliary stent, Liver abscess who presents to the ED with abdominal pain x2 days, 4 episodes of vomiting. Pt states that the abdominal pain started yesterday and feels a little better today. Pt denies exacerbating or relieving factors, pain does not change with food or movement. Pain described as non-radiating and constant and is located in the epigastrium. Pt has taken nothing for the pain. Pt had 4 episodes of non-bloody emesis and states it is yellow in color. Pt reports subjective fever and chills. Pt admits to weakness. Pt denies CP, palpitations, SOB, cough, myalgias, rash.     In ED Pt's vitals were: T(C) Max: 38.7, HR: 71, BP: 127/72, RR: 16, SpO2: 97% on RA  EKG - NSR, HR 69 bpm, RBBB  CXR - no active lung disease  CT Chest/Abd/Pel - 1. Chronic cholecystitis, as seen on prior exams. A superimposed acute component cannot be completely excluded. 2. Re-demonstration of a common bile duct stent with pneumobilia, compatible with stent patency. 3. Interval decreased conspicuity of previous noted abscesses throughout the liver with a small abscess remaining, as discussed.  Labs significant for WBC 17.5 with left shift, bandemia 13, initial lactate 4.0, repeat lactate 2.8 s/p 1.5L NS, BUN/Cr 52/6, Alk phos 353, AST 54. Albumin 1.3, Corrected Ca 9.6.    In ED, Pt given cipro, zosyn, zofran, morphine, 1.5L NS, Tylenol. (02 Jan 2019 16:48)      Follow Up: CAD, elevated trops    Interval History: Sitting on the chair.  Denies respiratory discomfort, CP, ort palpitations.  However, c/o nausea after taking meds.  No vomiting    EKG:  < from: 12 Lead ECG (01.06.19 @ 11:36) >  Ventricular Rate 81 BPM    Atrial Rate 81 BPM    P-R Interval 168 ms    QRS Duration 152 ms    Q-T Interval 416 ms    QTC Calculation(Bezet) 483 ms    P Axis 93 degrees    R Axis 96 degrees    T Axis -7 degrees    Diagnosis Line Normal sinus rhythm  Right bundle branch block  Inferior infarct (cited on or before 31-OCT-2018)        REVIEW OF SYSTEMS:   All other review of systems are negative unless indicated above    PAST MEDICAL & SURGICAL HISTORY:  Cholecystitis with cholangitis  Acute urinary retention  Liver abscess  ESRD (end stage renal disease) on dialysis: MWF  CAD (coronary artery disease): s/p stent in 2007  Diabetes  Myocardial infarction  Hypertension  H/O: hysterectomy      SOCIAL HISTORY:  No tobacco, ethanol, or drug abuse.    FAMILY HISTORY:  No pertinent family history in first degree relatives    No family history of acute MI or sudden cardiac death.    MEDICATIONS  (STANDING):  aspirin enteric coated 81 milliGRAM(s) Oral daily  atorvastatin 80 milliGRAM(s) Oral at bedtime  cholestyramine Powder (Sugar-Free) 4 Gram(s) Oral two times a day  ciprofloxacin     Tablet 250 milliGRAM(s) Oral at bedtime  dextrose 5%. 1000 milliLiter(s) (50 mL/Hr) IV Continuous <Continuous>  dextrose 50% Injectable 12.5 Gram(s) IV Push once  dextrose 50% Injectable 25 Gram(s) IV Push once  dextrose 50% Injectable 25 Gram(s) IV Push once  epoetin analilia Injectable 05378 Unit(s) IV Push <User Schedule>  gabapentin 300 milliGRAM(s) Oral at bedtime  heparin  Injectable 5000 Unit(s) SubCutaneous every 8 hours  insulin lispro (HumaLOG) corrective regimen sliding scale   SubCutaneous three times a day before meals  insulin lispro (HumaLOG) corrective regimen sliding scale   SubCutaneous at bedtime  isosorbide   mononitrate ER Tablet (IMDUR) 30 milliGRAM(s) Oral at bedtime  isosorbide   mononitrate ER Tablet (IMDUR) 60 milliGRAM(s) Oral daily  lactobacillus acidophilus 1 Tablet(s) Oral three times a day with meals  lidocaine   Patch 1 Patch Transdermal daily  metoprolol tartrate 50 milliGRAM(s) Oral two times a day  metroNIDAZOLE    Tablet 500 milliGRAM(s) Oral every 8 hours  NIFEdipine XL 60 milliGRAM(s) Oral daily  pantoprazole    Tablet 40 milliGRAM(s) Oral before breakfast    MEDICATIONS  (PRN):  bismuth subsalicylate Liquid 30 milliLiter(s) Oral three times a day PRN Diarrhea  dextrose 40% Gel 15 Gram(s) Oral once PRN Blood Glucose LESS THAN 70 milliGRAM(s)/deciliter  glucagon  Injectable 1 milliGRAM(s) IntraMuscular once PRN Glucose LESS THAN 70 milligrams/deciliter      Allergies    ertapenem (Urticaria)  Purell (Rash)    Intolerances        Home meds:  Home Medications:  aspirin 81 mg oral delayed release tablet: 1 tab(s) orally once a day (14 Jan 2019 11:50)  atorvastatin 80 mg oral tablet: 1 tab(s) orally once a day (at bedtime) (14 Jan 2019 11:50)  epoetin analilia: 08339 unit(s) intravenous 3 times a day T/T/S  intra-dialysis  (02 Jan 2019 17:16)  gabapentin 300 mg oral capsule: 1 cap(s) orally once a day (at bedtime) (14 Jan 2019 11:50)  isosorbide mononitrate 30 mg oral tablet, extended release: 1 tab(s) orally once a day (at bedtime) (14 Jan 2019 11:50)  isosorbide mononitrate 60 mg oral tablet, extended release: 1 tab(s) orally once a day (14 Jan 2019 11:50)  metoprolol tartrate 50 mg oral tablet: 1 tab(s) orally 2 times a day (14 Jan 2019 11:50)  NIFEdipine 60 mg oral tablet, extended release: 1 tab(s) orally once a day (14 Jan 2019 11:50)        VITAL SIGNS:   Vital Signs Last 24 Hrs  T(C): 36.7 (14 Jan 2019 12:35), Max: 37 (13 Jan 2019 16:11)  T(F): 98 (14 Jan 2019 12:35), Max: 98.6 (13 Jan 2019 16:11)  HR: 82 (14 Jan 2019 12:35) (73 - 84)  BP: 144/68 (14 Jan 2019 12:35) (101/66 - 144/68)  BP(mean): --  RR: 18 (14 Jan 2019 12:35) (17 - 18)  SpO2: 100% (14 Jan 2019 12:35) (98% - 100%)    I&O's Summary    13 Jan 2019 07:01  -  14 Jan 2019 07:00  --------------------------------------------------------  IN: 240 mL / OUT: 0 mL / NET: 240 mL    14 Jan 2019 07:01  -  14 Jan 2019 14:12  --------------------------------------------------------  IN: 0 mL / OUT: 1300 mL / NET: -1300 mL        On Exam:  TELE: off  Constitutional: NAD, awake and alert, obese  HEENT: Moist Mucous Membranes, Anicteric  Pulmonary: Non-labored, breath sounds are diminished bilaterally, No wheezing, rales or rhonchi  Cardiovascular: Regular, S1 and S2, No murmurs, rubs, gallops or clicks  Gastrointestinal: Bowel Sounds present, soft, nontender.   Lymph: No peripheral edema. No lymphadenopathy.  Skin: No visible rashes or ulcers.  Psych:  Mood & affect appropriate    LABS: All Labs Reviewed:                        9.9    15.39 )-----------( 462      ( 14 Jan 2019 09:16 )             32.7                         9.7    13.27 )-----------( 408      ( 13 Jan 2019 07:21 )             31.8                         9.8    15.95 )-----------( 399      ( 12 Jan 2019 06:46 )             32.7     14 Jan 2019 09:16    139    |  103    |  18     ----------------------------<  184    4.1     |  29     |  4.40   13 Jan 2019 07:21    141    |  105    |  9      ----------------------------<  112    3.9     |  30     |  2.90   12 Jan 2019 06:46    139    |  105    |  18     ----------------------------<  96     3.9     |  27     |  4.20     Ca    8.1        14 Jan 2019 09:16  Ca    7.9        13 Jan 2019 07:21  Ca    7.7        12 Jan 2019 06:46    TPro  6.7    /  Alb  1.2    /  TBili  0.3    /  DBili  .10    /  AST  166    /  ALT  57     /  AlkPhos  198    12 Jan 2019 06:46        Blood Culture:         RADIOLOGY:  < from: 12 Lead ECG (01.06.19 @ 11:36) >  Ventricular Rate 81 BPM    Atrial Rate 81 BPM    P-R Interval 168 ms    QRS Duration 152 ms    Q-T Interval 416 ms    QTC Calculation(Bezet) 483 ms    P Axis 93 degrees    R Axis 96 degrees    T Axis -7 degrees    Diagnosis Line Normal sinus rhythm  Right bundle branch block  Inferior infarct (cited on or before 31-OCT-2018)  Abnormal ECG

## 2019-01-14 NOTE — PROGRESS NOTE ADULT - PROVIDER SPECIALTY LIST ADULT
Cardiology
Gastroenterology
Hospitalist
Infectious Disease
Nephrology
Surgery
Gastroenterology
Nephrology
Nephrology
Surgery
Gastroenterology
Infectious Disease

## 2019-01-14 NOTE — PROGRESS NOTE ADULT - SUBJECTIVE AND OBJECTIVE BOX
INTERVAL HPI/OVERNIGHT EVENTS:  pt seen and examined  denies abd pain, c/o loose stools  per flowsheets 1 bm yesterday and 1 overnight  afebrile overnight labs noted  ate well at breakfast yesterday    MEDICATIONS  (STANDING):  aspirin enteric coated 81 milliGRAM(s) Oral daily  atorvastatin 80 milliGRAM(s) Oral at bedtime  cholestyramine Powder (Sugar-Free) 4 Gram(s) Oral two times a day  ciprofloxacin     Tablet 250 milliGRAM(s) Oral at bedtime  dextrose 5%. 1000 milliLiter(s) (50 mL/Hr) IV Continuous <Continuous>  dextrose 50% Injectable 12.5 Gram(s) IV Push once  dextrose 50% Injectable 25 Gram(s) IV Push once  dextrose 50% Injectable 25 Gram(s) IV Push once  epoetin analilia Injectable 45815 Unit(s) IV Push <User Schedule>  gabapentin 300 milliGRAM(s) Oral at bedtime  heparin  Injectable 5000 Unit(s) SubCutaneous every 8 hours  insulin lispro (HumaLOG) corrective regimen sliding scale   SubCutaneous three times a day before meals  insulin lispro (HumaLOG) corrective regimen sliding scale   SubCutaneous at bedtime  isosorbide   mononitrate ER Tablet (IMDUR) 30 milliGRAM(s) Oral at bedtime  isosorbide   mononitrate ER Tablet (IMDUR) 60 milliGRAM(s) Oral daily  lactobacillus acidophilus 1 Tablet(s) Oral three times a day with meals  lidocaine   Patch 1 Patch Transdermal daily  metoprolol tartrate 50 milliGRAM(s) Oral two times a day  metroNIDAZOLE    Tablet 500 milliGRAM(s) Oral every 8 hours  NIFEdipine XL 60 milliGRAM(s) Oral daily  pantoprazole    Tablet 40 milliGRAM(s) Oral before breakfast    MEDICATIONS  (PRN):  bismuth subsalicylate Liquid 30 milliLiter(s) Oral three times a day PRN Diarrhea  dextrose 40% Gel 15 Gram(s) Oral once PRN Blood Glucose LESS THAN 70 milliGRAM(s)/deciliter  glucagon  Injectable 1 milliGRAM(s) IntraMuscular once PRN Glucose LESS THAN 70 milligrams/deciliter      Allergies    ertapenem (Urticaria)  Purell (Rash)    Intolerances        Review of Systems:    General:  No wt loss, fevers, chills, night sweats, fatigue   Eyes:  Good vision, no reported pain  ENT:  No sore throat, pain, runny nose, dysphagia  CV:  No pain, palpitations, hypo/hypertension  Resp:  No dyspnea, cough, tachypnea, wheezing  GI:  No pain, No nausea, No vomiting, ?diarrhea, No constipation, No weight loss, No fever, No pruritis, No rectal bleeding, No melena, No dysphagia  :  No pain, bleeding, incontinence, nocturia  Muscle:  No pain, weakness  Neuro:  No weakness, tingling, memory problems  Psych:  No fatigue, insomnia, mood problems, depression  Endocrine:  No polyuria, polydypsia, cold/heat intolerance  Heme:  No petechiae, ecchymosis, easy bruisability  Skin:  No rash, tattoos, scars, edema      Vital Signs Last 24 Hrs  T(C): 36.9 (14 Jan 2019 09:05), Max: 37 (13 Jan 2019 16:11)  T(F): 98.5 (14 Jan 2019 09:05), Max: 98.6 (13 Jan 2019 16:11)  HR: 84 (14 Jan 2019 09:05) (71 - 84)  BP: 132/59 (14 Jan 2019 09:05) (101/66 - 146/71)  BP(mean): --  RR: 18 (14 Jan 2019 09:05) (17 - 18)  SpO2: 98% (14 Jan 2019 09:05) (98% - 100%)    PHYSICAL EXAM:    Constitutional: NAD  HEENT: ncat  Neck: No LAD  Respiratory: dec bs  Cardiovascular: S1 and S2, RRR  Gastrointestinal: obese soft nt nd  Extremities: trace edema  Vascular: 2+ peripheral pulses  Neurological: awake alert  Skin: No rashes        LABS:                        9.9    15.39 )-----------( 462      ( 14 Jan 2019 09:16 )             32.7     01-14    139  |  103  |  18  ----------------------------<  184<H>  4.1   |  29  |  4.40<H>    Ca    8.1<L>      14 Jan 2019 09:16            RADIOLOGY & ADDITIONAL TESTS:

## 2019-01-14 NOTE — PROGRESS NOTE ADULT - ASSESSMENT
·	ESRD on HD  ·	Klebsiella bacteremia  ·	s/p recent ERCP and CBD stone extraction @ [facility], h/o Liver abscess  ·	Diabetes  ·	Hypertension    HD today. To continue HD TIW as scheduled. Fluid removal as tolerated by BP. Dietary and PO fluid restriction.   Monitor BP trend. Titrate BP meds as needed. Salt restriction. ID follow up. On PO abx,   Monitor blood sugar levels. Insulin coverage as needed. Surgery follow up as out pt.  D/c planning.

## 2019-01-14 NOTE — PROGRESS NOTE ADULT - ATTENDING COMMENTS
Chart reviewed    Patient seen and examined    Agree with plan as outlined above
Chart reviewed    Patient seen and examined    Agree with plan as outlined above
Seen/examined. agree with above.
The patient was personally seen and examined, in addition to being examined and evaluated by NP.  All elements of the note were edited where appropriate.
The patient was personally seen and examined, in addition to being examined and evaluated by NP.  All elements of the note were edited where appropriate.
chart reviewed    Pt seen and examined    Agree with plan as outlined above
I personally saw and examined the patient in detail.  I have spoken to the above provider regarding the assessment and plan of care.  I reviewed the above assessment and plan of care, and agree.  I have made changes in the body of the note where appropriate.
The patient was personally seen and examined, in addition to being examined and evaluated by NP.  All elements of the note were edited where appropriate.
I saw and examined the patient personally. Spoke with above provider regarding this case. I reviewed the above findings completely.  I agree with the above history, physical, and plan which I have edited where appropriate.
Advanced care planning was discussed with patient and family.  Advanced care planning forms were reviewed and discussed.  Risks, benefits and alternatives of gastroenterologic procedures were discussed in detail and all questions were answered.    30 minutes spent.
Advanced care planning was discussed with patient and family.  Advanced care planning forms were reviewed and discussed.  Risks, benefits and alternatives of gastroenterologic procedures were discussed in detail and all questions were answered.    30 minutes spent.
D/W with Dr. Lambert, Dr. Campos, and housestaff team     Ed Solis MD  [phone]
Call if ID input needed over the weekend, Dr. Daria Bob is on call.    Ed Solis MD  [phone]
D/W Family at bedside  All Q's answered to the best of my ability  D/W Dr. Lambert.    Please recall if needed.    Thank you for the courtesy of this referral.    Ed Solis MD  [phone]
Note written by attending, see above.  Time spent: 40min. More than 50% of the visit was spent counseling the patient / pt's family on her condition - Klebsiella bacteremia, sepsis, liver abscesses, diabetic insulin management.
Note written by attending, see above.  Time spent: 40min. More than 50% of the visit was spent counseling the patient on her condition - Klebsiella bacteremia, sepsis, liver abscesses, diabetic insulin management.
69 yo F with PMH of ESRD on HD (M, W, F), CAD s/p stents 2007, DM, HTN, MI, biliary stent, Liver abscess who presents to the ED with abdominal pain x2 days, 4 episodes of vomiting. Admitted for sepsis 2/2 acute on chronic cholecystitis with bactermia       Sepsis.   Pt met sepsis criteria (fever, leukocytosis, source) on admission, likely 2/2 acute on chronic cholecystitis   - CT Chest/Abd/Pel showed possible acute kevin on chronic kevin, decreased conspicousity of liver abscess - small abscess remains.  - Lactate downtrended to wnl.  with bactermia gram negative andrew  will repeat 2 more sets of BC  contiue on zosyn/cipro   - Continue pain regimen and Tylenol PRN for fever.  - WBC still elevated but downtrending.  HIDA scan pending  discussed with surgery tentatively scheduled for lap kevin on 1/7/18, plavix placed on hold, cardio informed    DMII  hypoglycemic likely scondary from insulin and being NPO  will start on clear liquid  montior fs
Pt seen + examined. Case discussed with intern/resident. Agree with assessment and plan above (edited) with following addendum:  Time spent: 40min. More than 50% of the visit was spent counseling the patient /pt's family on medical condition -- sepsis, Klebsiella bacteremia, resolving liver abscess, diabetes management.    67yo F with PMH of ESRD on HD (M, W, F), CAD s/p stents 2007, Type 2 DM, HTN, MI, recent admission with ascending cholangitis with ESBL E coli bacteremia s/p biliary stent, recent admission with liver abscesses (on Tigecycline), who presents to the ED with abdominal pain x2 days, 4 episodes of vomiting, admitted for sepsis due to suspected acute on chronic cholecystitis found to have Klebsiella bacteremia.  Pt met sepsis criteria (fever, leukocytosis, source) on admission, suspect 2/2 acute on chronic cholecystitis   - CT Chest/Abd/Pel showed possible acute on chronic kevin, decreased appearance of liver abscess - small abscess remains.  - BCx positive for Klebsiella -- repeat cultures negative so far   - while on IV Zosyn cultures cleared, but sensitivities came back as intermediate to zosyn so continue IV Rocephin and Flagyl (started 1/6) per ID (the flagyl given since pt had liver abscess and in those situations broader coverage recommended) -- as per ID recs will c/w current regimen until 1/10 as inpatient and then switch to po cipro/flagyl for a week as outpt.   - surgery, Dr. Campos, recommends cholecystectomy in the future when pt's albumin improves.  -- spoke with Dr. Smith for 2nd opinion, if it is appropriate to perform cholecystectomy earlier. --- Dr. Smith heard the case in its entirety including the severe hypoalbuminemia and the recurrent bacteremia/septicemia within past couple of months, and after evaluating today Dr. Smith told me that he agrees with Dr. Campos and with a 2nd opinion provided by Dr. Flores on 1/7. Dr. Campos had requested Dr. Flores's assessment.   - GI advise against removal of biliary stent before cholecystectomy given pt had septic shock with ascending cholangitis recently.
Pt seen + examined. Case discussed with intern/resident. Agree with assessment and plan above (edited) with following addendum:  Time spent: 40min. More than 50% of the visit was spent counseling the patient /pt's family on medical condition -- sepsis, Klebsiella bacteremia, resolving liver abscess, diabetes management.    69yo F with PMH of ESRD on HD (M, W, F), CAD s/p stents 2007, Type 2 DM, HTN, MI, recent admission with ascending cholangitis with ESBL E coli bacteremia s/p biliary stent, recent admission with liver abscesses (on Tigecycline), who presents to the ED with abdominal pain x2 days, 4 episodes of vomiting, admitted for sepsis due to suspected acute on chronic cholecystitis found to have Klebsiella bacteremia.  Pt met sepsis criteria (fever, leukocytosis, source) on admission, suspect 2/2 acute on chronic cholecystitis   - CT Chest/Abd/Pel showed possible acute kevin on chronic kevin, decreased appearance of liver abscess - small abscess remains.  - Lactate downtrended to wnl. ; Leukocytosis trending down  - BCx positive for Klebsiella -- repeat cultures negative so far   - while on IV Zosyn cultures cleared, but sensitivities came back as intermediate to zosyn so continue IV Rocephin and Flagyl (started 1/6) per ID (the flagyl given since pt had liver abscess and in those situations broader coverage recommended)  - surgery, Dr. Campos, recommends cholecystectomy in the future when pt's albumin improves.  -- will get second opinion from Dr. Smith if it is appropriate to perform cholecystectomy earlier.
Pt seen + examined. Case discussed with intern/resident. Agree with assessment and plan above (edited) with following addendum:  Time spent: 30min. More than 50% of the visit was spent counseling the patient /pt's family on medical condition -- sepsis, Klebsiella bacteremia, resolving liver abscess, diabetes management.    69yo F with PMH of ESRD on HD (M, W, F), CAD s/p stents 2007, Type 2 DM, HTN, MI, recent admission with ascending cholangitis with ESBL E coli bacteremia s/p biliary stent, recent admission with liver abscesses (on Tigecycline), who presents to the ED with abdominal pain x2 days, 4 episodes of vomiting, admitted for sepsis due to suspected acute on chronic cholecystitis found to have Klebsiella bacteremia.  Pt met sepsis criteria (fever, leukocytosis, source) on admission, suspect 2/2 acute on chronic cholecystitis   - CT Chest/Abd/Pel showed possible acute on chronic kevin, decreased appearance of liver abscess - small abscess remains.  - BCx positive for Klebsiella -- repeat cultures negative so far   - while on IV Zosyn cultures cleared, but sensitivities came back as intermediate to zosyn so continue IV Rocephin and Flagyl (started 1/6) per ID (the flagyl given since pt had liver abscess and in those situations broader coverage recommended) -- as per ID recs will c/w current regimen until 1/10 as inpatient and then switch to po cipro/flagyl for a week as outpt.   - surgery, Dr. Campos, recommends cholecystectomy in the future when pt's albumin improves.  -- spoke with Dr. Smith for 2nd opinion, if it is appropriate to perform cholecystectomy earlier. --- Dr. Smith heard the case in its entirety including the severe hypoalbuminemia and the recurrent bacteremia/septicemia within past couple of months, and after evaluating today Dr. Smith told me that he agrees with Dr. Campos and with a 2nd opinion provided by Dr. Flores on 1/7. Dr. Campos had requested Dr. Flores's assessment.   - GI advise against removal of biliary stent before cholecystectomy given pt had septic shock with ascending cholangitis recently.  - dispo planning -- likely [facility], family working with SW
Agree with plan and exam as above with following: discussed case with Dr. Campos, will hold off on surgery at this time, resume plavix.  Continue IV antibiotics, course to be determined with ID. Rest agree as above.
Pt seen + examined. Case discussed with intern/resident. Agree with assessment and plan above (edited) with following addendum:  Time spent: 30min. More than 50% of the visit was spent counseling the patient /pt's family on medical condition -- sepsis, Klebsiella bacteremia, resolving liver abscess, diabetes management.    69yo F with PMH of ESRD on HD (M, W, F), CAD s/p stents 2007, Type 2 DM, HTN, MI, recent admission with ascending cholangitis with ESBL E coli bacteremia s/p biliary stent, recent admission with liver abscesses (on Tigecycline), who presents to the ED with abdominal pain x2 days, 4 episodes of vomiting, admitted for sepsis due to suspected acute on chronic cholecystitis found to have Klebsiella bacteremia.  Pt met sepsis criteria (fever, leukocytosis, source) on admission, suspect 2/2 acute on chronic cholecystitis   - CT Chest/Abd/Pel showed possible acute on chronic kevin, decreased appearance of liver abscess - small abscess remains.  - BCx positive for Klebsiella -- repeat cultures negative so far   - while on IV Zosyn cultures cleared, but sensitivities came back as intermediate to zosyn so continue IV Rocephin and Flagyl (started 1/6) per ID (the flagyl given since pt had liver abscess and in those situations broader coverage recommended) -- as per ID recs will c/w current regimen until 1/10 as inpatient and then switch to po cipro/flagyl for a week as outpt.   - surgery, Dr. Campos, recommends cholecystectomy in the future when pt's albumin improves.  -- spoke with Dr. Smith for 2nd opinion, if it is appropriate to perform cholecystectomy earlier. --- Dr. Smith heard the case in its entirety including the severe hypoalbuminemia and the recurrent bacteremia/septicemia within past couple of months, and after evaluating today Dr. Smith told me that he agrees with Dr. Campos and with a 2nd opinion provided by Dr. Flores on 1/7. Dr. Campos had requested Dr. Flores's assessment.   - GI advise against removal of biliary stent before cholecystectomy given pt had septic shock with ascending cholangitis recently.  - dispo planning -- likely [facility]
Pt seen + examined. Case discussed with intern/resident. Agree with assessment and plan above (edited) with following addendum:  Time spent: 40min. More than 50% of the visit was spent counseling the patient /pt's family on medical condition -- sepsis, Klebsiella bacteremia, resolving liver abscess, diabetes management, severe hypoalbuminemia, diarrhea.    69yo F with PMH of ESRD on HD (M, W, F), CAD s/p stents 2007, Type 2 DM, HTN, MI, recent admission with ascending cholangitis with ESBL E coli bacteremia s/p biliary stent, recent admission with liver abscesses (on Tigecycline), who presents to the ED with abdominal pain x2 days, 4 episodes of vomiting, admitted for sepsis due to suspected acute on chronic cholecystitis found to have Klebsiella bacteremia.  Pt met sepsis criteria (fever, leukocytosis, source) on admission, suspect 2/2 acute on chronic cholecystitis   - CT Chest/Abd/Pel showed possible acute on chronic kevin, decreased appearance of liver abscess - small abscess remains.  - BCx positive for Klebsiella -- repeat cultures negative so far   - while on IV Zosyn cultures cleared, but sensitivities came back as intermediate to zosyn so continue IV Rocephin and Flagyl (started 1/6) per ID (the flagyl given since pt had liver abscess and in those situations broader coverage recommended) -- now completed IV Abx and switched to po cipro/flagyl for a week as outpt. (day 1/7)  - surgery, Dr. Campos, recommends cholecystectomy in the future when pt's albumin improves.  -- spoke with Dr. Smith for 2nd opinion, if it is appropriate to perform cholecystectomy earlier. --- Dr. Smtih heard the case in its entirety including the severe hypoalbuminemia and the recurrent bacteremia/septicemia within past couple of months, and after evaluating today Dr. Smith told me that he agrees with Dr. Campos and with a 2nd opinion provided by Dr. Flores on 1/7. Dr. Campos had requested Dr. Flores's assessment.   - GI advise against removal of biliary stent before cholecystectomy given pt had septic shock with ascending cholangitis recently.  - no longer on IV Abx, asked IR to remove the tunneled PICC line in L IJ to reduce potential sources of infection, discussed with Dr. Solis who is in agreement.   - gave one dose loperamide for the loose stool. c diff PCR and GI PCR are both negative. on cholestyramine and PRN pepto-Bismol as per GI -- GI f/up regarding anti-diarrheal medications  - dispo planning -- today, family refused [facility] and wish to take pt home but no one in the home until Monday.

## 2019-01-14 NOTE — PROGRESS NOTE ADULT - ASSESSMENT
69 yo F with PMH of ESRD on HD (M, W, F), CAD s/p stents 2007, DM, HTN, MI, biliary stent, Liver abscess who presents to the ED with abdominal pain x2 days, 4 episodes of vomiting, leukocytosis and Klebsiella bacteremia  Source not defined

## 2019-01-14 NOTE — PROGRESS NOTE ADULT - PROBLEM SELECTOR PROBLEM 3
Loose stools
Cholecystitis with cholangitis
Loose stools
Cholecystitis with cholangitis
Liver abscess
Loose stools

## 2019-01-14 NOTE — PROGRESS NOTE ADULT - PROBLEM SELECTOR PROBLEM 4
Anemia
Leukocytosis, unspecified type
Anemia
ESRD (end stage renal disease) on dialysis
Leukocytosis, unspecified type
ESRD (end stage renal disease) on dialysis

## 2019-01-14 NOTE — PROGRESS NOTE ADULT - SUBJECTIVE AND OBJECTIVE BOX
Patient is a 68y old  Female who presents with a chief complaint of abd pain, vomiting (03 Jan 2019 12:08)      Patient seen in follow up for ESRD on HD. + Klebsiella bacteremia    PAST MEDICAL HISTORY:  Cholecystitis with cholangitis  Acute urinary retention  Liver abscess  ESRD (end stage renal disease) on dialysis  CAD (coronary artery disease)  Diabetes  CRF (chronic renal failure)  Hypertension  Pneumonia  Myocardial infarction  Hypertension  Diabetes    MEDICATIONS  (STANDING):  aspirin enteric coated 81 milliGRAM(s) Oral daily  atorvastatin 80 milliGRAM(s) Oral at bedtime  cholestyramine Powder (Sugar-Free) 4 Gram(s) Oral two times a day  ciprofloxacin     Tablet 250 milliGRAM(s) Oral at bedtime  dextrose 5%. 1000 milliLiter(s) (50 mL/Hr) IV Continuous <Continuous>  dextrose 50% Injectable 12.5 Gram(s) IV Push once  dextrose 50% Injectable 25 Gram(s) IV Push once  dextrose 50% Injectable 25 Gram(s) IV Push once  epoetin analilia Injectable 42949 Unit(s) IV Push <User Schedule>  gabapentin 300 milliGRAM(s) Oral at bedtime  heparin  Injectable 5000 Unit(s) SubCutaneous every 8 hours  insulin lispro (HumaLOG) corrective regimen sliding scale   SubCutaneous three times a day before meals  insulin lispro (HumaLOG) corrective regimen sliding scale   SubCutaneous at bedtime  isosorbide   mononitrate ER Tablet (IMDUR) 30 milliGRAM(s) Oral at bedtime  isosorbide   mononitrate ER Tablet (IMDUR) 60 milliGRAM(s) Oral daily  lactobacillus acidophilus 1 Tablet(s) Oral three times a day with meals  lidocaine   Patch 1 Patch Transdermal daily  metoprolol tartrate 50 milliGRAM(s) Oral two times a day  metroNIDAZOLE    Tablet 500 milliGRAM(s) Oral every 8 hours  NIFEdipine XL 60 milliGRAM(s) Oral daily  pantoprazole    Tablet 40 milliGRAM(s) Oral before breakfast    MEDICATIONS  (PRN):  bismuth subsalicylate Liquid 30 milliLiter(s) Oral three times a day PRN Diarrhea  dextrose 40% Gel 15 Gram(s) Oral once PRN Blood Glucose LESS THAN 70 milliGRAM(s)/deciliter  glucagon  Injectable 1 milliGRAM(s) IntraMuscular once PRN Glucose LESS THAN 70 milligrams/deciliter    T(C): 36.9 (01-14-19 @ 09:05), Max: 37 (01-13-19 @ 16:11)  HR: 84 (01-14-19 @ 09:05) (70 - 96)  BP: 132/59 (01-14-19 @ 09:05) (101/66 - 146/71)  RR: 18 (01-14-19 @ 09:05)  SpO2: 98% (01-14-19 @ 09:05)  Wt(kg): --  I&O's Detail    13 Jan 2019 07:01  -  14 Jan 2019 07:00  --------------------------------------------------------  IN:    Oral Fluid: 240 mL  Total IN: 240 mL    OUT:  Total OUT: 0 mL    Total NET: 240 mL    PHYSICAL EXAM:  General: NAD  Respiratory: b/l air entry  Cardiovascular: S1 S2  Gastrointestinal: soft  Extremities:  edema               LABORATORY:                        9.9    15.39 )-----------( 462      ( 14 Jan 2019 09:16 )             32.7     01-14    139  |  103  |  18  ----------------------------<  184<H>  4.1   |  29  |  4.40<H>    Ca    8.1<L>      14 Jan 2019 09:16      Sodium, Serum: 139 mmol/L (01-14 @ 09:16)  Sodium, Serum: 141 mmol/L (01-13 @ 07:21)    Potassium, Serum: 4.1 mmol/L (01-14 @ 09:16)  Potassium, Serum: 3.9 mmol/L (01-13 @ 07:21)    Hemoglobin: 9.9 g/dL (01-14 @ 09:16)  Hemoglobin: 9.7 g/dL (01-13 @ 07:21)  Hemoglobin: 9.8 g/dL (01-12 @ 06:46)    Creatinine, Serum 4.40 (01-14 @ 09:16)  Creatinine, Serum 2.90 (01-13 @ 07:21)  Creatinine, Serum 4.20 (01-12 @ 06:46)

## 2019-01-14 NOTE — PROGRESS NOTE ADULT - SUBJECTIVE AND OBJECTIVE BOX
pt seen  c/o diarrhea  denies abdominal pain  ICU Vital Signs Last 24 Hrs  T(C): 36.9 (14 Jan 2019 09:05), Max: 37 (13 Jan 2019 16:11)  T(F): 98.5 (14 Jan 2019 09:05), Max: 98.6 (13 Jan 2019 16:11)  HR: 84 (14 Jan 2019 09:05) (71 - 84)  BP: 132/59 (14 Jan 2019 09:05) (101/66 - 146/71)  BP(mean): --  ABP: --  ABP(mean): --  RR: 18 (14 Jan 2019 09:05) (17 - 18)  SpO2: 98% (14 Jan 2019 09:05) (98% - 100%)  gen-NAD  resp-clear  abd-soft ND/NT                          9.7    13.27 )-----------( 408      ( 13 Jan 2019 07:21 )             31.8   01-13    141  |  105  |  9   ----------------------------<  112<H>  3.9   |  30  |  2.90<H>    Ca    7.9<L>      13 Jan 2019 07:21

## 2019-01-14 NOTE — PROGRESS NOTE ADULT - PROBLEM SELECTOR PLAN 3
gi pcr and cdiff neg  cont bacid, questran, pepto prn  imodium prn  monitor stool frequency/consistency

## 2019-01-14 NOTE — PROGRESS NOTE ADULT - PROBLEM SELECTOR PROBLEM 2
Liver abscess
Cholecystitis
Liver abscess
Cholecystitis
Liver abscess

## 2019-01-14 NOTE — PROGRESS NOTE ADULT - PROBLEM SELECTOR PLAN 4
chronic, multifactorial  no overt s/s gib   trend h/h, transfuse prn  cont ppi  consider iron studies  if downtrends check fobt
improving
ESRD on HD  - Continue HD per renal (Dr. Albarran).  - f/u BMP.
ESRD on HD (M, W, F)  - Continue HD per renal.  - f/u Nephro (Dr. Albarran).  - f/u BMP.
about the same  monitor
chronic, multifactorial  no overt s/s gib   trend h/h, transfuse prn  cont ppi  consider iron studies  if continues to downtrend check fobt
improving
improving
monitor
monitor  trending down
variable.    Thank you for consulting us and involving us in the management of this most interesting and challenging case.     Please Call with any further questions
ESRD on HD  - Continue HD per renal (Dr. Albarran).  - f/u BMP.
ESRD on HD  - Continue HD per renal (Dr. Albarran).  - f/u BMP.
ESRD on HD  - Continue HD per renal (tomorrow per Dr. Albarran)  - f/u Nephro (Dr. Albarran).  - f/u BMP.

## 2019-01-14 NOTE — PROGRESS NOTE ADULT - ASSESSMENT
68 yo with hx of cholecystitis/cholangitis       cont current care       elective cholecystectomy when better optimized

## 2019-01-14 NOTE — PROGRESS NOTE ADULT - SUBJECTIVE AND OBJECTIVE BOX
infectious diseases progress note:    IRENE TAYLOR is a 69y y. o. Female patient    Patient reports: continued abd discomfort    ROS:    EYES:  Negative  blurry vision or double vision  GASTROINTESTINAL:  Negative for nausea, vomiting, diarrhea  -otherwise negative except for subjective    Allergies    ertapenem (Urticaria)  Purell (Rash)    Intolerances        ANTIBIOTICS/RELEVANT:  antimicrobials  ciprofloxacin     Tablet 250 milliGRAM(s) Oral at bedtime  metroNIDAZOLE    Tablet 500 milliGRAM(s) Oral every 8 hours    immunologic:  epoetin analilia Injectable 09135 Unit(s) IV Push <User Schedule>    OTHER:  aspirin enteric coated 81 milliGRAM(s) Oral daily  atorvastatin 80 milliGRAM(s) Oral at bedtime  bismuth subsalicylate Liquid 30 milliLiter(s) Oral three times a day PRN  cholestyramine Powder (Sugar-Free) 4 Gram(s) Oral two times a day  dextrose 40% Gel 15 Gram(s) Oral once PRN  dextrose 5%. 1000 milliLiter(s) IV Continuous <Continuous>  dextrose 50% Injectable 12.5 Gram(s) IV Push once  dextrose 50% Injectable 25 Gram(s) IV Push once  dextrose 50% Injectable 25 Gram(s) IV Push once  gabapentin 300 milliGRAM(s) Oral at bedtime  glucagon  Injectable 1 milliGRAM(s) IntraMuscular once PRN  heparin  Injectable 5000 Unit(s) SubCutaneous every 8 hours  insulin lispro (HumaLOG) corrective regimen sliding scale   SubCutaneous three times a day before meals  insulin lispro (HumaLOG) corrective regimen sliding scale   SubCutaneous at bedtime  isosorbide   mononitrate ER Tablet (IMDUR) 30 milliGRAM(s) Oral at bedtime  isosorbide   mononitrate ER Tablet (IMDUR) 60 milliGRAM(s) Oral daily  lactobacillus acidophilus 1 Tablet(s) Oral three times a day with meals  lidocaine   Patch 1 Patch Transdermal daily  metoprolol tartrate 50 milliGRAM(s) Oral two times a day  NIFEdipine XL 60 milliGRAM(s) Oral daily  pantoprazole    Tablet 40 milliGRAM(s) Oral before breakfast      Objective:  Last 24-Vital Signs Last 24 Hrs  T(C): 36.9 (14 Jan 2019 09:05), Max: 37 (13 Jan 2019 16:11)  T(F): 98.5 (14 Jan 2019 09:05), Max: 98.6 (13 Jan 2019 16:11)  HR: 84 (14 Jan 2019 09:05) (71 - 84)  BP: 132/59 (14 Jan 2019 09:05) (101/66 - 146/71)  BP(mean): --  RR: 18 (14 Jan 2019 09:05) (17 - 18)  SpO2: 98% (14 Jan 2019 09:05) (98% - 100%)    T(C): 36.9 (01-14-19 @ 09:05), Max: 37 (01-13-19 @ 16:11)  T(F): 98.5 (01-14-19 @ 09:05), Max: 98.6 (01-13-19 @ 16:11)  T(C): 36.9 (01-14-19 @ 09:05), Max: 37.2 (01-11-19 @ 12:06)  T(F): 98.5 (01-14-19 @ 09:05), Max: 99 (01-11-19 @ 12:06)  T(C): 36.9 (01-14-19 @ 09:05), Max: 37.3 (01-11-19 @ 08:06)  T(F): 98.5 (01-14-19 @ 09:05), Max: 99.1 (01-11-19 @ 08:06)    PHYSICAL EXAM:  Constitutional: Well-developed, well nourished  Eyes: PERRLA, EOMI  Ear/Nose/Throat: oropharynx normal	  Neck: no JVD, no lymphadenopathy, supple  Respiratory: no accessory muscle use, lung fields bilaterally clear  Cardiovascular: RRR, normal S1, S2 no m/r/g  Gastrointestinal: soft, NT, no HSM, BS-normal  Extremities: no clubbing, no cyanosis, edema absent  Neuro: patient alert, oriented and appropriate  Skin: no sig lesions      LABS:                        9.9    15.39 )-----------( 462      ( 14 Jan 2019 09:16 )             32.7       WBC 15.39  01-14 @ 09:16  WBC 13.27  01-13 @ 07:21  WBC 15.95  01-12 @ 06:46  WBC 12.58  01-11 @ 06:49  WBC 10.90  01-10 @ 07:20  WBC 12.14  01-09 @ 06:31  WBC 12.31  01-08 @ 06:22      01-14    139  |  103  |  18  ----------------------------<  184<H>  4.1   |  29  |  4.40<H>    Ca    8.1<L>      14 Jan 2019 09:16        Creatinine, Serum: 4.40 mg/dL (01-14-19 @ 09:16)  Creatinine, Serum: 2.90 mg/dL (01-13-19 @ 07:21)  Creatinine, Serum: 4.20 mg/dL (01-12-19 @ 06:46)  Creatinine, Serum: 3.10 mg/dL (01-11-19 @ 06:49)  Creatinine, Serum: 4.40 mg/dL (01-10-19 @ 07:20)  Creatinine, Serum: 3.40 mg/dL (01-09-19 @ 06:31)  Creatinine, Serum: 5.60 mg/dL (01-08-19 @ 06:22)                MICROBIOLOGY:              RADIOLOGY & ADDITIONAL STUDIES:

## 2019-01-14 NOTE — PROGRESS NOTE ADULT - PROBLEM SELECTOR PROBLEM 1
Cholecystitis
Bacteremia due to Gram-negative bacteria
Cholecystitis
Bacteremia due to Gram-negative bacteria
Sepsis
Cholecystitis
Sepsis

## 2019-01-14 NOTE — PROGRESS NOTE ADULT - ASSESSMENT
69 yo F with PMH of ESRD on HD (M, W, F) via permacath started in June 2018, CAD s/p 1 stent 2007, DM type 2, HTN, MI, biliary stent, Liver abscess, obesity who presents to the ED with abdominal pain x2 days, 4 episodes of vomiting. Currently admitted for sepsis 2/2 acute on chronic cholecystitis vs failed treatment of liver abscess. Patient had recent admission at [facility] November 2018 for acute cholecystitis causing sepsis, S/P Perc Kevin 10/30 and ESBL E. coli bacteremia. She was treated with IV ertapenem and developed A fIb with RVR. ERCP was attempted at [facility] but was unsuccessful and was transferred to [facility] where ERCP was performed with stone extraction and stent placement. She also had another admission in November 2018 at [place] shortly after for acute pancreatitis. Patient has been in rehab s/p last discharge from [facility].     - Continue ASA, statin, Imdur   - No need to resume Plavix as her stent is remote  - Follow Surgery recs re: open kevin and CBD stent removal, no plan at present per GI  - No acute changes on EKG compared to previous.    - Increase procardia XL 90mg with BP parameters  - Continue metoprolol 50 mg q12H  - HD per renal.  Monitor volume status  - Abx per ID for liver abscess  - Other cardiovascular workup will depend on clinical course.  - All other workup per primary team.  - Will continue to follow.    All Shaffer DNP  Cardiology

## 2019-01-14 NOTE — PROGRESS NOTE ADULT - REASON FOR ADMISSION
abd pain, vomiting

## 2019-01-14 NOTE — PROGRESS NOTE ADULT - PROBLEM SELECTOR PLAN 1
acute on chronic  hida noted, neg for acute kevin  klebsiella bacteremia, repeat cx ngtd  cont abx as per id  diet as tolerated  further plans as per surgery, for lap ekvin when optimized  cbd stent in place, no plans for stent removal at present, will plan for eventual removal after cholecystectomy  will follow

## 2019-03-07 NOTE — PROVIDER CONTACT NOTE (OTHER) - SITUATION
pt had 3 beats of v tac asympathetic
pt vomited borwnish
pt c/o right shoulder pain./  Pt states pain is continuous and is unable to be more descriptive of pain.  Pt states she has never had pain like this before
no

## 2019-03-21 NOTE — PHYSICAL THERAPY INITIAL EVALUATION ADULT - BALANCE DISTURBANCE, IDENTIFIED IMPAIRMENT CONTRIBUTE, REHAB EVAL
Will start eye drops - use in both eyes are R eye is starting to feel affected  Good hand washing    Follow-up with Dr Geovani Choi on 3/26
decreased strength

## 2019-04-07 NOTE — ED ADULT TRIAGE NOTE - MEANS OF ARRIVAL
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 Pt urgently placed on BIPAP. Unclear if sx related to underlying pulm pathology +/- aggravating factor such as viral infection, PNA, or heart failure, pulm edema. Check labs, EKG, CXR, flu swab, cx, meds, consult with pulm, SPCU.

## 2019-04-15 NOTE — PROGRESS NOTE ADULT - PROBLEM SELECTOR PLAN 3
Patient examined, evaluated, and educated on discharge prescriptions and instructions by NP. Patient discharged to lobby by NP.   resolved per nursing, passing formed bms  bowel regimen dcd  cont bacid, cont questran  if recurs send stool studies  monitor for constipation  monitor abd exam/stool output

## 2019-05-06 PROBLEM — R33.8 OTHER RETENTION OF URINE: Chronic | Status: ACTIVE | Noted: 2019-01-02

## 2019-05-06 PROBLEM — K81.9 CHOLECYSTITIS, UNSPECIFIED: Chronic | Status: ACTIVE | Noted: 2019-01-02

## 2019-05-22 ENCOUNTER — OUTPATIENT (OUTPATIENT)
Dept: OUTPATIENT SERVICES | Facility: HOSPITAL | Age: 69
LOS: 1 days | End: 2019-05-22
Payer: MEDICARE

## 2019-05-22 VITALS
WEIGHT: 177.91 LBS | DIASTOLIC BLOOD PRESSURE: 72 MMHG | RESPIRATION RATE: 16 BRPM | OXYGEN SATURATION: 98 % | TEMPERATURE: 98 F | HEART RATE: 76 BPM | SYSTOLIC BLOOD PRESSURE: 168 MMHG | HEIGHT: 65 IN

## 2019-05-22 DIAGNOSIS — Z98.890 OTHER SPECIFIED POSTPROCEDURAL STATES: Chronic | ICD-10-CM

## 2019-05-22 DIAGNOSIS — E11.9 TYPE 2 DIABETES MELLITUS WITHOUT COMPLICATIONS: ICD-10-CM

## 2019-05-22 DIAGNOSIS — I25.10 ATHEROSCLEROTIC HEART DISEASE OF NATIVE CORONARY ARTERY WITHOUT ANGINA PECTORIS: ICD-10-CM

## 2019-05-22 DIAGNOSIS — N18.6 END STAGE RENAL DISEASE: ICD-10-CM

## 2019-05-22 DIAGNOSIS — I10 ESSENTIAL (PRIMARY) HYPERTENSION: ICD-10-CM

## 2019-05-22 DIAGNOSIS — K86.1 OTHER CHRONIC PANCREATITIS: ICD-10-CM

## 2019-05-22 DIAGNOSIS — Z01.818 ENCOUNTER FOR OTHER PREPROCEDURAL EXAMINATION: ICD-10-CM

## 2019-05-22 DIAGNOSIS — Z90.710 ACQUIRED ABSENCE OF BOTH CERVIX AND UTERUS: Chronic | ICD-10-CM

## 2019-05-22 LAB
ALBUMIN SERPL ELPH-MCNC: 2.8 G/DL — LOW (ref 3.3–5)
ALP SERPL-CCNC: 129 U/L — HIGH (ref 40–120)
ALT FLD-CCNC: 7 U/L — LOW (ref 12–78)
ANION GAP SERPL CALC-SCNC: 7 MMOL/L — SIGNIFICANT CHANGE UP (ref 5–17)
AST SERPL-CCNC: 11 U/L — LOW (ref 15–37)
BILIRUB SERPL-MCNC: 0.4 MG/DL — SIGNIFICANT CHANGE UP (ref 0.2–1.2)
BUN SERPL-MCNC: 58 MG/DL — HIGH (ref 7–23)
CALCIUM SERPL-MCNC: 8.8 MG/DL — SIGNIFICANT CHANGE UP (ref 8.5–10.1)
CHLORIDE SERPL-SCNC: 103 MMOL/L — SIGNIFICANT CHANGE UP (ref 96–108)
CO2 SERPL-SCNC: 28 MMOL/L — SIGNIFICANT CHANGE UP (ref 22–31)
CREAT SERPL-MCNC: 5.3 MG/DL — HIGH (ref 0.5–1.3)
GLUCOSE SERPL-MCNC: 184 MG/DL — HIGH (ref 70–99)
HBA1C BLD-MCNC: 8.2 % — HIGH (ref 4–5.6)
HCT VFR BLD CALC: 38.2 % — SIGNIFICANT CHANGE UP (ref 34.5–45)
HGB BLD-MCNC: 11.2 G/DL — LOW (ref 11.5–15.5)
MCHC RBC-ENTMCNC: 27.6 PG — SIGNIFICANT CHANGE UP (ref 27–34)
MCHC RBC-ENTMCNC: 29.3 GM/DL — LOW (ref 32–36)
MCV RBC AUTO: 94.1 FL — SIGNIFICANT CHANGE UP (ref 80–100)
NRBC # BLD: 0 /100 WBCS — SIGNIFICANT CHANGE UP (ref 0–0)
PLATELET # BLD AUTO: 311 K/UL — SIGNIFICANT CHANGE UP (ref 150–400)
POTASSIUM SERPL-MCNC: 5.3 MMOL/L — SIGNIFICANT CHANGE UP (ref 3.5–5.3)
POTASSIUM SERPL-SCNC: 5.3 MMOL/L — SIGNIFICANT CHANGE UP (ref 3.5–5.3)
PROT SERPL-MCNC: 8.2 G/DL — SIGNIFICANT CHANGE UP (ref 6–8.3)
RBC # BLD: 4.06 M/UL — SIGNIFICANT CHANGE UP (ref 3.8–5.2)
RBC # FLD: 16.7 % — HIGH (ref 10.3–14.5)
SODIUM SERPL-SCNC: 138 MMOL/L — SIGNIFICANT CHANGE UP (ref 135–145)
WBC # BLD: 10.05 K/UL — SIGNIFICANT CHANGE UP (ref 3.8–10.5)
WBC # FLD AUTO: 10.05 K/UL — SIGNIFICANT CHANGE UP (ref 3.8–10.5)

## 2019-05-22 PROCEDURE — G0463: CPT

## 2019-05-22 PROCEDURE — 83036 HEMOGLOBIN GLYCOSYLATED A1C: CPT

## 2019-05-22 PROCEDURE — 85027 COMPLETE CBC AUTOMATED: CPT

## 2019-05-22 PROCEDURE — 80053 COMPREHEN METABOLIC PANEL: CPT

## 2019-05-22 PROCEDURE — 36415 COLL VENOUS BLD VENIPUNCTURE: CPT

## 2019-05-22 NOTE — H&P PST ADULT - NSICDXPASTSURGICALHX_GEN_ALL_CORE_FT
PAST SURGICAL HISTORY:  H/O: hysterectomy 1990    History of biliary stent insertion 12/2018    S/P arteriovenous (AV) fistula creation 05/17/2019

## 2019-05-22 NOTE — H&P PST ADULT - NS MD HP INPLANTS MED DEV
AV fistula, cor stent/Vascular access device/Vascular stents/Clips Vascular stents/Clips/Vascular access device/AV fistula, cor stent x 1, biliary stent

## 2019-05-22 NOTE — H&P PST ADULT - HISTORY OF PRESENT ILLNESS
Pt is a 68 yo F with PMH of ESRD on HD (M, W, F), CAD s/p stents 2007, DM 2, HTN, MI (2007), biliary stent, Liver abscess, chronic cholecystitis. Pt was c/o abdominal pain s/p biliary stent insertion [id number]. Pt had f/u GI consult- scheduled for ERCP on 05/29/2019.  Pt denies CP, palpitations, SOB, cough, myalgias, rash.

## 2019-05-22 NOTE — H&P PST ADULT - NSICDXPASTMEDICALHX_GEN_ALL_CORE_FT
PAST MEDICAL HISTORY:  Acute urinary retention     CAD (coronary artery disease) s/p stent x 1 in 2007    Cholecystitis with cholangitis     Chronic pancreatitis     Diabetes Type 2 DM    ESRD (end stage renal disease) on dialysis MWF since 05/2018    Hypertension     Liver abscess     Myocardial infarction 2007

## 2019-05-22 NOTE — H&P PST ADULT - NSICDXPROBLEM_GEN_ALL_CORE_FT
PROBLEM DIAGNOSES  Problem: CAD (coronary artery disease)  Assessment and Plan: continue with Aspirin    Problem: Other chronic pancreatitis  Assessment and Plan: ERCP  Labs- CBC, Hb A1C, CMP, EKG on file  Pre op instructions discussed      Problem: ESRD (end stage renal disease)  Assessment and Plan: Continue with HD  Reapet K+ DOS    Problem: DM type 2, goal HbA1c < 7%  Assessment and Plan: FS BS DOS    Problem: Benign hypertension  Assessment and Plan: Continue with meds

## 2019-05-22 NOTE — H&P PST ADULT - NSANTHOSAYNRD_GEN_A_CORE
No. LIZZY screening performed.  STOP BANG Legend: 0-2 = LOW Risk; 3-4 = INTERMEDIATE Risk; 5-8 = HIGH Risk

## 2019-05-22 NOTE — H&P PST ADULT - OTHER CARE PROVIDERS
Dr.shaik Monk ([facility])[phone] Dr.shaik Monk ([facility])[phone]  Last visit 05/2019

## 2019-05-29 ENCOUNTER — INPATIENT (INPATIENT)
Facility: HOSPITAL | Age: 69
LOS: 0 days | Discharge: LEFT BEFORE DISCHARGE | DRG: 640 | End: 2019-05-30
Attending: FAMILY MEDICINE | Admitting: FAMILY MEDICINE
Payer: MEDICARE

## 2019-05-29 VITALS
DIASTOLIC BLOOD PRESSURE: 54 MMHG | SYSTOLIC BLOOD PRESSURE: 154 MMHG | WEIGHT: 143.96 LBS | TEMPERATURE: 98 F | RESPIRATION RATE: 14 BRPM | HEART RATE: 65 BPM | HEIGHT: 60 IN | OXYGEN SATURATION: 100 %

## 2019-05-29 DIAGNOSIS — N18.6 END STAGE RENAL DISEASE: ICD-10-CM

## 2019-05-29 DIAGNOSIS — Z98.890 OTHER SPECIFIED POSTPROCEDURAL STATES: Chronic | ICD-10-CM

## 2019-05-29 DIAGNOSIS — Z90.710 ACQUIRED ABSENCE OF BOTH CERVIX AND UTERUS: Chronic | ICD-10-CM

## 2019-05-29 DIAGNOSIS — Z29.9 ENCOUNTER FOR PROPHYLACTIC MEASURES, UNSPECIFIED: ICD-10-CM

## 2019-05-29 DIAGNOSIS — E87.5 HYPERKALEMIA: ICD-10-CM

## 2019-05-29 PROBLEM — K86.1 OTHER CHRONIC PANCREATITIS: Chronic | Status: ACTIVE | Noted: 2019-05-22

## 2019-05-29 PROBLEM — E11.9 TYPE 2 DIABETES MELLITUS WITHOUT COMPLICATIONS: Chronic | Status: ACTIVE | Noted: 2018-05-03

## 2019-05-29 PROBLEM — I21.9 ACUTE MYOCARDIAL INFARCTION, UNSPECIFIED: Chronic | Status: ACTIVE | Noted: 2018-05-03

## 2019-05-29 LAB
HCT VFR BLD CALC: 35.6 % — SIGNIFICANT CHANGE UP (ref 34.5–45)
HGB BLD-MCNC: 10.6 G/DL — LOW (ref 11.5–15.5)
INR BLD: 1.26 RATIO — HIGH (ref 0.88–1.16)
MCHC RBC-ENTMCNC: 28.1 PG — SIGNIFICANT CHANGE UP (ref 27–34)
MCHC RBC-ENTMCNC: 29.8 GM/DL — LOW (ref 32–36)
MCV RBC AUTO: 94.4 FL — SIGNIFICANT CHANGE UP (ref 80–100)
NRBC # BLD: 0 /100 WBCS — SIGNIFICANT CHANGE UP (ref 0–0)
PLATELET # BLD AUTO: 278 K/UL — SIGNIFICANT CHANGE UP (ref 150–400)
PROTHROM AB SERPL-ACNC: 14.5 SEC — HIGH (ref 10–12.9)
RBC # BLD: 3.77 M/UL — LOW (ref 3.8–5.2)
RBC # FLD: 16.2 % — HIGH (ref 10.3–14.5)
WBC # BLD: 9.61 K/UL — SIGNIFICANT CHANGE UP (ref 3.8–10.5)
WBC # FLD AUTO: 9.61 K/UL — SIGNIFICANT CHANGE UP (ref 3.8–10.5)

## 2019-05-29 PROCEDURE — 93010 ELECTROCARDIOGRAM REPORT: CPT | Mod: 76

## 2019-05-29 PROCEDURE — 99222 1ST HOSP IP/OBS MODERATE 55: CPT

## 2019-05-29 PROCEDURE — 71045 X-RAY EXAM CHEST 1 VIEW: CPT | Mod: 26

## 2019-05-29 PROCEDURE — 99285 EMERGENCY DEPT VISIT HI MDM: CPT

## 2019-05-29 RX ORDER — ASPIRIN/CALCIUM CARB/MAGNESIUM 324 MG
81 TABLET ORAL DAILY
Refills: 0 | Status: DISCONTINUED | OUTPATIENT
Start: 2019-05-29 | End: 2019-05-30

## 2019-05-29 RX ORDER — ATORVASTATIN CALCIUM 80 MG/1
80 TABLET, FILM COATED ORAL AT BEDTIME
Refills: 0 | Status: DISCONTINUED | OUTPATIENT
Start: 2019-05-29 | End: 2019-05-30

## 2019-05-29 RX ORDER — IOHEXOL 300 MG/ML
30 INJECTION, SOLUTION INTRAVENOUS ONCE
Refills: 0 | Status: COMPLETED | OUTPATIENT
Start: 2019-05-29 | End: 2019-05-29

## 2019-05-29 RX ORDER — ISOSORBIDE MONONITRATE 60 MG/1
60 TABLET, EXTENDED RELEASE ORAL DAILY
Refills: 0 | Status: DISCONTINUED | OUTPATIENT
Start: 2019-05-29 | End: 2019-05-30

## 2019-05-29 RX ORDER — METOPROLOL TARTRATE 50 MG
50 TABLET ORAL
Refills: 0 | Status: DISCONTINUED | OUTPATIENT
Start: 2019-05-29 | End: 2019-05-30

## 2019-05-29 RX ORDER — NIFEDIPINE 30 MG
60 TABLET, EXTENDED RELEASE 24 HR ORAL DAILY
Refills: 0 | Status: DISCONTINUED | OUTPATIENT
Start: 2019-05-29 | End: 2019-05-30

## 2019-05-29 RX ORDER — GABAPENTIN 400 MG/1
300 CAPSULE ORAL
Refills: 0 | Status: DISCONTINUED | OUTPATIENT
Start: 2019-05-29 | End: 2019-05-30

## 2019-05-29 RX ADMIN — IOHEXOL 30 MILLILITER(S): 300 INJECTION, SOLUTION INTRAVENOUS at 20:57

## 2019-05-29 RX ADMIN — ATORVASTATIN CALCIUM 80 MILLIGRAM(S): 80 TABLET, FILM COATED ORAL at 22:04

## 2019-05-29 RX ADMIN — GABAPENTIN 300 MILLIGRAM(S): 400 CAPSULE ORAL at 22:20

## 2019-05-29 NOTE — CONSULT NOTE ADULT - SUBJECTIVE AND OBJECTIVE BOX
IRENE TAYLOR  MRN-[id number] 69y    GENERAL SURGERY/ DR. FOWLER    70 YO FEMALE WITH PAST MEDICAL HISTORY OF CRF ON HD, MI, TYPE 2 DIABETES, CAD WITH CORONARY STENTS, LIVER ABSCESS, PREVIOUS ADMISSION TO HOSPITAL FOR CBD STONE S/P ERCP 2018, SCHEDULED FOR ERCP DUE TO ABDOMINAL PAIN AND NOW POST POND DUE TO HYPERKALEMIA , CURRENTLY IN DIALYSIS UNIT AT THE PRESENT TIME WITH CO COMPLAINTS.    PATIENT KNOWN TO DR. PATTERSON GI AND ERCP CANCELLED TODAY FOR HYPERKALEMIA, SURGICAL CONSUL CALLED TO ARRANGE A  COMBINE PROCEDURE  CHOLECYSTECTOMY WITH ERCP.       PAST MEDICAL & SURGICAL HISTORY:    Chronic pancreatitis (K86.1)  Cholecystitis with cholangitis (K81.9)  Acute urinary retention (R33.8)  Liver abscess (K75.0)  ESRD (end stage renal disease) on dialysis (N18.6): MWF since 05/2018  CAD (coronary artery disease) (I25.10): s/p stent x 1 in 2007  Diabetes (E11.9): Type 2 DM  CRF (chronic renal failure) (N18.9)  Hypertension (I10)  Pneumonia (J18.9)  Myocardial infarction (I21.9): 2007  Hypertension (I10)  Diabetes (E11.9)  S/P arteriovenous (AV) fistula creation (Z98.890): 05/17/2019  History of biliary stent insertion (Z98.890): 12/2018  H/O: hysterectomy (Z90.710): 1990    Home Medications:  aspirin 81 mg oral delayed release tablet: 1 tab(s) orally once a day (29 May 2019 12:19)  atorvastatin 80 mg oral tablet: 1 tab(s) orally once a day (at bedtime) (29 May 2019 12:19)  gabapentin 300 mg oral capsule: 1 cap(s) orally 2 times a day (29 May 2019 12:19)  isosorbide mononitrate 30 mg oral tablet, extended release: 1 tab(s) orally once a day (at bedtime) (29 May 2019 12:19)  isosorbide mononitrate 60 mg oral tablet, extended release: 1 tab(s) orally once a day (29 May 2019 12:19)  metoprolol tartrate 50 mg oral tablet: 1 tab(s) orally 2 times a day (29 May 2019 12:19)  NIFEdipine 60 mg oral tablet, extended release: 1 tab(s) orally once a day (29 May 2019 12:19)    Allergies  ertapenem (Urticaria)  Purell (Rash)    Vital Signs Last 24 Hrs  T(C): 36.7 (29 May 2019 13:45), Max: 36.8 (29 May 2019 12:21)  T(F): 98 (29 May 2019 13:45), Max: 98.2 (29 May 2019 12:21)  HR: 65 (29 May 2019 13:45) (65 - 65)  BP: 154/54 (29 May 2019 13:45) (154/54 - 154/64)  BP(mean): --  RR: 14 (29 May 2019 13:45) (14 - 14)  SpO2: 100% (29 May 2019 13:45) (100% - 100%)    CAPILLARY BLOOD GLUCOSE  POCT Blood Glucose.: 94 mg/dL (29 May 2019 16:54)  POCT Blood Glucose.: 136 mg/dL (29 May 2019 12:13)      LUNGS: CLEAR TO AUSCULTATION , NO W/R/R  LEFT FOREARM AV FISTULA WITH GOOD THRILL   ABDOMEN: WELL HEALED LOWER MIDLINE SCAR POST HYSTERECTOMY, + BS, PROTUBERANT,  SOFT, NON DISTENDED, NON TENDER   EXTREMITY: NO EDEMA, NO CALF TENDERNESS                             10.6   9.61  )-----------( 278      ( 29 May 2019 15:14 )             35.6      05-29    136  |  102  |  66<H>  ----------------------------<  156<H>  6.6<HH>   |  28  |  5.60<H>    Ca    8.8      29 May 2019 13:00                         ASSESSMENT &  PLAN:     70 YO FEMALE WITH PAST MEDICAL HISTORY OF CRF ON HD, MI, TYPE 2 DIABETES, CAD WITH CORONARY STENTS, LIVER ABSCESS, HISTORY OF  CBD STONE S/P ERCP 2018  ABDOMINA PAIN POST ERCP  HYPERKALEMIA     CASE DISCUSSED WITH GI DR. PATTERSON , GI WILL ORDER CT ABDOMEN AND PELVIS AFTER DIALYSIS TODAY  NEPHROLOGY CONSULT NOTED  WILL NEED MEDICAL, CARDIOLOGY, RENAL CLEARANCE FOR LAPAROSCOPIC POSSIBLE OPEN CHOLECYSTECTOMY AND COORDINATE PLAN WITH GI FOR SIMULTANEOUS ERCP       WILL FOLLOW UP IRENE TAYLOR  MRN-[id number] 69y    GENERAL SURGERY/ DR. FOWLER    70 YO FEMALE WITH PAST MEDICAL HISTORY OF CRF ON HD, MI, TYPE 2 DIABETES, CAD WITH CORONARY STENTS, LIVER ABSCESS, PREVIOUS ADMISSION TO HOSPITAL FOR CBD STONE S/P ERCP 2018, SCHEDULED FOR ERCP DUE TO ABDOMINAL PAIN AND NOW POST POND DUE TO HYPERKALEMIA , CURRENTLY IN DIALYSIS UNIT AT THE PRESENT TIME WITH CO COMPLAINTS.    PATIENT KNOWN TO DR. PATTERSON GI AND ERCP CANCELLED TODAY FOR HYPERKALEMIA, SURGICAL CONSUL CALLED TO ARRANGE A  COMBINE PROCEDURE  CHOLECYSTECTOMY WITH ERCP.       PAST MEDICAL & SURGICAL HISTORY:    Chronic pancreatitis (K86.1)  Cholecystitis with cholangitis (K81.9)  Acute urinary retention (R33.8)  Liver abscess (K75.0)  ESRD (end stage renal disease) on dialysis (N18.6): MWF since 05/2018  CAD (coronary artery disease) (I25.10): s/p stent x 1 in 2007  Diabetes (E11.9): Type 2 DM  CRF (chronic renal failure) (N18.9)  Hypertension (I10)  Pneumonia (J18.9)  Myocardial infarction (I21.9): 2007  Hypertension (I10)  Diabetes (E11.9)  S/P arteriovenous (AV) fistula creation (Z98.890): 05/17/2019  History of biliary stent insertion (Z98.890): 12/2018  H/O: hysterectomy (Z90.710): 1990    Home Medications:  aspirin 81 mg oral delayed release tablet: 1 tab(s) orally once a day (29 May 2019 12:19)  atorvastatin 80 mg oral tablet: 1 tab(s) orally once a day (at bedtime) (29 May 2019 12:19)  gabapentin 300 mg oral capsule: 1 cap(s) orally 2 times a day (29 May 2019 12:19)  isosorbide mononitrate 30 mg oral tablet, extended release: 1 tab(s) orally once a day (at bedtime) (29 May 2019 12:19)  isosorbide mononitrate 60 mg oral tablet, extended release: 1 tab(s) orally once a day (29 May 2019 12:19)  metoprolol tartrate 50 mg oral tablet: 1 tab(s) orally 2 times a day (29 May 2019 12:19)  NIFEdipine 60 mg oral tablet, extended release: 1 tab(s) orally once a day (29 May 2019 12:19)    Allergies  ertapenem (Urticaria)  Purell (Rash)    Vital Signs Last 24 Hrs  T(C): 36.7 (29 May 2019 13:45), Max: 36.8 (29 May 2019 12:21)  T(F): 98 (29 May 2019 13:45), Max: 98.2 (29 May 2019 12:21)  HR: 65 (29 May 2019 13:45) (65 - 65)  BP: 154/54 (29 May 2019 13:45) (154/54 - 154/64)  BP(mean): --  RR: 14 (29 May 2019 13:45) (14 - 14)  SpO2: 100% (29 May 2019 13:45) (100% - 100%)    CAPILLARY BLOOD GLUCOSE  POCT Blood Glucose.: 94 mg/dL (29 May 2019 16:54)  POCT Blood Glucose.: 136 mg/dL (29 May 2019 12:13)      LUNGS: CLEAR TO AUSCULTATION , NO W/R/R  LEFT FOREARM AV FISTULA WITH GOOD THRILL   ABDOMEN: WELL HEALED LOWER MIDLINE SCAR POST HYSTERECTOMY, + BS, PROTUBERANT,  SOFT, NON DISTENDED, NON TENDER   EXTREMITY: NO EDEMA, NO CALF TENDERNESS                             10.6   9.61  )-----------( 278      ( 29 May 2019 15:14 )             35.6      05-29    136  |  102  |  66<H>  ----------------------------<  156<H>  6.6<HH>   |  28  |  5.60<H>    Ca    8.8      29 May 2019 13:00       CT Abdomen and Pelvis w/ IV Cont (01.02.19 @ 15:04)             EXAM:  CT CHEST IC                          PROCEDURE DATE:  01/02/2019        INTERPRETATION:  .    CLINICAL INFORMATION: Lower chest pain.    TECHNIQUE: Helical axial images were obtained from the thoracic inlet to   the pubic symphysis. 95 mls of Omnipaque-350 was administered   intravenously without complication and 5 mls were discarded. Oral   contrast was not administered. Sagittal and coronal reconstructed images   were obtained from the source data.    COMPARISON: Most recent prior CT examination of the abdomen and pelvis   from 11/26/2018. Prior MRI examination of the abdomen from 11/27/2018.   Prior chest, abdomen, and pelvis CT examination from 10/29/2018.    FINDINGS: Scattered subcentimeter sized lymph nodes are noted within the   axillary regions, mediastinum, and hilar areas.    The heart size is enlarged. The pericardium appears unremarkable. There   is a right sided dialysis catheter with its tip at the right atrium. A   left-sided IJ central line is noted with the tip at the SVC. No filling   defects are notable within the pulmonary arterial vessels. There are   atherosclerotic calcifications of the imaged coronary arteries, aorta,   and branch vessels. The imaged portions of the aorta are normal in   caliber.    The central airways are patent. Patchy groundglass opacities are notable   throughout both lungs with resultant mosaic attenuation. Scattered areas   of linear atelectasis are notable within the bilateral lung bases, left   upper lobe, and lingula.    There is redemonstration of nonspecific gallbladder wall thickening   and/or edema and/or fat deposition. Sludge and/or stones are noted within   the lumen of the gallbladder. A cholecystostomy tube tract is notable   along the right upper anterior abdominal wall. A small amount of   pneumobilia is noted, compatible with stent patency. The common bile duct   remains dilated and measures up to 1.2 cm. A common bile duct stent is   again noted. No radiopaque stones are notable adjacent to the stent.   Previously noted abscesses within the liver appear less conspicuous   compared to the prior MRI and CT examinations. A small low-attenuation   focus is noted within the central hepatic area measuring up to 8 mm   (series 2, image 114).     The pancreas, spleen, and adrenal glands appear unremarkable.    There is atrophy of the bilateral kidneys. A left-sided renal cyst   appears unchanged. There is minimal nonspecific bilateral perinephric   thickening. There is no hydronephrosis bilaterally. Arterial vascular   calcifications are noted in the vicinity of the bilateral renal   collecting systems. The urinary bladder appears unremarkable.    There are multiple scattered nonspecificsubcentimeter retroperitoneal   and mesenteric lymph nodes.    There is no bowel obstruction or abdominal ascites. A duodenal   diverticulum appears unchanged. Gas and stool are notable throughout the   large bowel loops. The appendix is normal.    The patient is status post hysterectomy. No adnexal masses are noted.    Multilevel degenerative changes notable within the imaged spine. There is   a moderate compression deformity of the L2 vertebral body which appears   unchanged. There is also a mild superior endplate deformity of the T12   vertebral body which also appears unchanged.    Areas of mixed lucency and sclerosis adjacent to the bilateral SI joints   appear unchanged. Mild diffuse osteosclerosis may be compatible with   early renal osteodystrophy.    IMPRESSION:    CT chest:  1. Groundglass mosaic attenuation to the lungs which may reflect small   airways or small vessel disease.    CT abdomen and pelvis:  1. Chronic cholecystitis, as seen on prior exams. A superimposed acute   component cannot be completely excluded.  2. Redemonstration of a common bile duct stent with pneumobilia,   compatible with stent patency.  3. Interval decreased conspicuity of previous noted abscesses throughout   the liver with a small abscess remaining, as discussed.  4. Other chronic nonemergent findings, as discussed.    CARLITO BEE M.D., ATTENDING RADIOLOGIST    [facility] Hepatobiliary Imaging (01.03.19 @ 18:18)    INTERPRETATION:    RADIOPHARMACEUTICAL:  99mTc-Mebrofenin  DOSE: 3.0 mCi IV    CLINICAL INFORMATION: 68 year old year old female with epigastric pain   and fever, referred to evaluate for acute cholecystitis    TECHNIQUE: Dynamic images of the anterior abdomen were obtained for 90   minutes immediately following radiotracer injection. Static images of the   abdomen in the anterior, right anterior obliqueand right lateral   projections were also obtained.    COMPARISON: No previous hepatobiliary scan for comparison.    FINDINGS: There is prompt uptake of the injected radiotracer by the   hepatocytes. The gallbladder is first visualized at 55 minutes post   tracer injection and bowel activity by 35 minutes. There is normal tracer   clearance from the liver by the end of the study.     IMPRESSION: Normal hepatobiliary scan.     No scan evidence of acute cholecystitis.     REE BRIDGES M.D., NUCLEAR MEDICINE ATTENDING                        ASSESSMENT &  PLAN:     70 YO FEMALE WITH PAST MEDICAL HISTORY OF CRF ON HD, MI, TYPE 2 DIABETES, CAD WITH CORONARY STENTS, LIVER ABSCESS, HISTORY OF  CBD STONE S/P ERCP 2018  ABDOMINA PAIN POST ERCP  HYPERKALEMIA     CASE DISCUSSED WITH GI DR. PATTERSON , GI WILL ORDER CT ABDOMEN AND PELVIS AFTER DIALYSIS TODAY  NEPHROLOGY CONSULT NOTED  WILL NEED MEDICAL, CARDIOLOGY, RENAL CLEARANCE FOR LAPAROSCOPIC POSSIBLE OPEN CHOLECYSTECTOMY AND COORDINATE PLAN WITH GI FOR SIMULTANEOUS ERCP       WILL FOLLOW UP IRENE TAYLOR  MRN-[id number] 69y    GENERAL SURGERY/ DR. FOWLER    70 YO FEMALE WITH PAST MEDICAL HISTORY OF CRF ON HD, MI, TYPE 2 DIABETES, CAD WITH CORONARY STENTS, LIVER ABSCESS, PREVIOUS ADMISSION TO HOSPITAL FOR CBD STONE S/P ERCP 11/2018 BY DR. RITTER, S/P PERCUTANEOUS CHOLECYSTOTOMY 10/30/2018 FOR SEPTICEMIA BY DR. AGUIRRE , NOW SCHEDULED FOR ERCP DUE TO ABDOMINAL PAIN AND POST POND TODAY DUE TO HYPERKALEMIA , CURRENTLY IN DIALYSIS UNIT AT THE PRESENT TIME WITH CO COMPLAINTS.    PATIENT KNOWN TO DR. PATTERSON GI AND ERCP CANCELLED TODAY FOR HYPERKALEMIA, SURGICAL CONSUL CALLED TO ARRANGE A  COMBINE PROCEDURE  CHOLECYSTECTOMY WITH ERCP.       PAST MEDICAL & SURGICAL HISTORY:    Chronic pancreatitis (K86.1)  Cholecystitis with cholangitis (K81.9)  Acute urinary retention (R33.8)  Liver abscess (K75.0)  ESRD (end stage renal disease) on dialysis (N18.6): MWF since 05/2018  CAD (coronary artery disease) (I25.10): s/p stent x 1 in 2007  Diabetes (E11.9): Type 2 DM  CRF (chronic renal failure) (N18.9)  Hypertension (I10)  Pneumonia (J18.9)  Myocardial infarction (I21.9): 2007  Hypertension (I10)  Diabetes (E11.9)  S/P arteriovenous (AV) fistula creation (Z98.890): 05/17/2019  History of biliary stent insertion (Z98.890): 12/2018  H/O: hysterectomy (Z90.710): 1990  history of percutaneous cholecystostomy     Home Medications:  aspirin 81 mg oral delayed release tablet: 1 tab(s) orally once a day (29 May 2019 12:19)  atorvastatin 80 mg oral tablet: 1 tab(s) orally once a day (at bedtime) (29 May 2019 12:19)  gabapentin 300 mg oral capsule: 1 cap(s) orally 2 times a day (29 May 2019 12:19)  isosorbide mononitrate 30 mg oral tablet, extended release: 1 tab(s) orally once a day (at bedtime) (29 May 2019 12:19)  isosorbide mononitrate 60 mg oral tablet, extended release: 1 tab(s) orally once a day (29 May 2019 12:19)  metoprolol tartrate 50 mg oral tablet: 1 tab(s) orally 2 times a day (29 May 2019 12:19)  NIFEdipine 60 mg oral tablet, extended release: 1 tab(s) orally once a day (29 May 2019 12:19)    Allergies  ertapenem (Urticaria)  Purell (Rash)    Vital Signs Last 24 Hrs  T(C): 36.7 (29 May 2019 13:45), Max: 36.8 (29 May 2019 12:21)  T(F): 98 (29 May 2019 13:45), Max: 98.2 (29 May 2019 12:21)  HR: 65 (29 May 2019 13:45) (65 - 65)  BP: 154/54 (29 May 2019 13:45) (154/54 - 154/64)  BP(mean): --  RR: 14 (29 May 2019 13:45) (14 - 14)  SpO2: 100% (29 May 2019 13:45) (100% - 100%)    CAPILLARY BLOOD GLUCOSE  POCT Blood Glucose.: 94 mg/dL (29 May 2019 16:54)  POCT Blood Glucose.: 136 mg/dL (29 May 2019 12:13)      LUNGS: CLEAR TO AUSCULTATION , NO W/R/R  LEFT FOREARM AV FISTULA WITH GOOD THRILL   ABDOMEN: WELL HEALED LOWER MIDLINE SCAR POST HYSTERECTOMY, + BS, PROTUBERANT,  SOFT, NON DISTENDED, NON TENDER   EXTREMITY: NO EDEMA, NO CALF TENDERNESS                             10.6   9.61  )-----------( 278      ( 29 May 2019 15:14 )             35.6      05-29    136  |  102  |  66<H>  ----------------------------<  156<H>  6.6<HH>   |  28  |  5.60<H>    Ca    8.8      29 May 2019 13:00       CT Abdomen and Pelvis w/ IV Cont (01.02.19 @ 15:04)             EXAM:  CT CHEST IC                          PROCEDURE DATE:  01/02/2019        INTERPRETATION:  .    CLINICAL INFORMATION: Lower chest pain.    TECHNIQUE: Helical axial images were obtained from the thoracic inlet to   the pubic symphysis. 95 mls of Omnipaque-350 was administered   intravenously without complication and 5 mls were discarded. Oral   contrast was not administered. Sagittal and coronal reconstructed images   were obtained from the source data.    COMPARISON: Most recent prior CT examination of the abdomen and pelvis   from 11/26/2018. Prior MRI examination of the abdomen from 11/27/2018.   Prior chest, abdomen, and pelvis CT examination from 10/29/2018.    FINDINGS: Scattered subcentimeter sized lymph nodes are noted within the   axillary regions, mediastinum, and hilar areas.    The heart size is enlarged. The pericardium appears unremarkable. There   is a right sided dialysis catheter with its tip at the right atrium. A   left-sided IJ central line is noted with the tip at the SVC. No filling   defects are notable within the pulmonary arterial vessels. There are   atherosclerotic calcifications of the imaged coronary arteries, aorta,   and branch vessels. The imaged portions of the aorta are normal in   caliber.    The central airways are patent. Patchy groundglass opacities are notable   throughout both lungs with resultant mosaic attenuation. Scattered areas   of linear atelectasis are notable within the bilateral lung bases, left   upper lobe, and lingula.    There is redemonstration of nonspecific gallbladder wall thickening   and/or edema and/or fat deposition. Sludge and/or stones are noted within   the lumen of the gallbladder. A cholecystostomy tube tract is notable   along the right upper anterior abdominal wall. A small amount of   pneumobilia is noted, compatible with stent patency. The common bile duct   remains dilated and measures up to 1.2 cm. A common bile duct stent is   again noted. No radiopaque stones are notable adjacent to the stent.   Previously noted abscesses within the liver appear less conspicuous   compared to the prior MRI and CT examinations. A small low-attenuation   focus is noted within the central hepatic area measuring up to 8 mm   (series 2, image 114).     The pancreas, spleen, and adrenal glands appear unremarkable.    There is atrophy of the bilateral kidneys. A left-sided renal cyst   appears unchanged. There is minimal nonspecific bilateral perinephric   thickening. There is no hydronephrosis bilaterally. Arterial vascular   calcifications are noted in the vicinity of the bilateral renal   collecting systems. The urinary bladder appears unremarkable.    There are multiple scattered nonspecificsubcentimeter retroperitoneal   and mesenteric lymph nodes.    There is no bowel obstruction or abdominal ascites. A duodenal   diverticulum appears unchanged. Gas and stool are notable throughout the   large bowel loops. The appendix is normal.    The patient is status post hysterectomy. No adnexal masses are noted.    Multilevel degenerative changes notable within the imaged spine. There is   a moderate compression deformity of the L2 vertebral body which appears   unchanged. There is also a mild superior endplate deformity of the T12   vertebral body which also appears unchanged.    Areas of mixed lucency and sclerosis adjacent to the bilateral SI joints   appear unchanged. Mild diffuse osteosclerosis may be compatible with   early renal osteodystrophy.    IMPRESSION:    CT chest:  1. Groundglass mosaic attenuation to the lungs which may reflect small   airways or small vessel disease.    CT abdomen and pelvis:  1. Chronic cholecystitis, as seen on prior exams. A superimposed acute   component cannot be completely excluded.  2. Redemonstration of a common bile duct stent with pneumobilia,   compatible with stent patency.  3. Interval decreased conspicuity of previous noted abscesses throughout   the liver with a small abscess remaining, as discussed.  4. Other chronic nonemergent findings, as discussed.    CARLITO BEE M.D., ATTENDING RADIOLOGIST    [facility] Hepatobiliary Imaging (01.03.19 @ 18:18)    INTERPRETATION:    RADIOPHARMACEUTICAL:  99mTc-Mebrofenin  DOSE: 3.0 mCi IV    CLINICAL INFORMATION: 68 year old year old female with epigastric pain   and fever, referred to evaluate for acute cholecystitis    TECHNIQUE: Dynamic images of the anterior abdomen were obtained for 90   minutes immediately following radiotracer injection. Static images of the   abdomen in the anterior, right anterior obliqueand right lateral   projections were also obtained.    COMPARISON: No previous hepatobiliary scan for comparison.    FINDINGS: There is prompt uptake of the injected radiotracer by the   hepatocytes. The gallbladder is first visualized at 55 minutes post   tracer injection and bowel activity by 35 minutes. There is normal tracer   clearance from the liver by the end of the study.     IMPRESSION: Normal hepatobiliary scan.     No scan evidence of acute cholecystitis.     REE BRIDGES M.D., NUCLEAR MEDICINE ATTENDING                        ASSESSMENT &  PLAN:     70 YO FEMALE WITH PAST MEDICAL HISTORY OF CRF ON HD, MI, TYPE 2 DIABETES, CAD WITH CORONARY STENTS, LIVER ABSCESS, HISTORY OF  CBD STONE S/P ERCP 2018  ABDOMINA PAIN POST ERCP  HYPERKALEMIA     CASE DISCUSSED WITH GI DR. PATTERSON , GI WILL ORDER CT ABDOMEN AND PELVIS AFTER DIALYSIS TODAY  NEPHROLOGY CONSULT NOTED  WILL NEED MEDICAL, CARDIOLOGY, RENAL CLEARANCE FOR LAPAROSCOPIC POSSIBLE OPEN CHOLECYSTECTOMY AND COORDINATE PLAN WITH GI FOR SIMULTANEOUS ERCP       WILL FOLLOW UP

## 2019-05-29 NOTE — H&P ADULT - HISTORY OF PRESENT ILLNESS
70yo F with HTN, DM, MI, CAD, ESRD on HD sent to ED for elevated potassium on rountine blood work for ERCP. pt st no complaints. scheduled for HD today.  DR anderson will arrange for HD. pt st no complaints.  daughter in law insists on translating for pt  pt understands some english,   offerred and refused.  patient is being admitted for further work up and treatment.

## 2019-05-29 NOTE — ED ADULT NURSE NOTE - NSIMPLEMENTINTERV_GEN_ALL_ED
Implemented All Universal Safety Interventions:  [place] to call system. Call bell, personal items and telephone within reach. Instruct patient to call for assistance. Room bathroom lighting operational. Non-slip footwear when patient is off stretcher. Physically safe environment: no spills, clutter or unnecessary equipment. Stretcher in lowest position, wheels locked, appropriate side rails in place.

## 2019-05-29 NOTE — CONSULT NOTE ADULT - SUBJECTIVE AND OBJECTIVE BOX
[facility] P.CKaley Wright     Patient is a 69y old  Female who presents with a chief complaint of high potassium (29 May 2019 17:07)      HPI:  70yo F with HTN, DM, MI, CAD, ESRD on HD sent to ED for elevated potassium on rountine blood work for ERCP. pt st no complaints. scheduled for HD today.  DR anderson will arrange for HD. pt st no complaints.  daughter in law insists on translating for pt  pt understands some english,   offerred and refused.  patient is being admitted for further work up and treatment. (29 May 2019 16:47)      HPI:    69y Female for Cardiology Consult    PAST MEDICAL & SURGICAL HISTORY:  Chronic pancreatitis  Cholecystitis with cholangitis  Acute urinary retention  Liver abscess  ESRD (end stage renal disease) on dialysis: MWF since 05/2018  CAD (coronary artery disease): s/p stent x 1 in 2007  Diabetes: Type 2 DM  Myocardial infarction: 2007  Hypertension  S/P arteriovenous (AV) fistula creation: 05/17/2019  History of biliary stent insertion: 12/2018  H/O: hysterectomy: 1990      FAMILY HISTORY:  No pertinent family history in first degree relatives      SOCIAL HISTORY:   Alcohol:  Smoking:    Allergies    ertapenem (Urticaria)  Purell (Rash)    Intolerances        MEDICATIONS  (STANDING):  aspirin enteric coated 81 milliGRAM(s) Oral daily  atorvastatin 80 milliGRAM(s) Oral at bedtime  gabapentin 300 milliGRAM(s) Oral two times a day  isosorbide   mononitrate ER Tablet (IMDUR) 60 milliGRAM(s) Oral daily  metoprolol tartrate 50 milliGRAM(s) Oral two times a day  NIFEdipine XL 60 milliGRAM(s) Oral daily    MEDICATIONS  (PRN):      REVIEW OF SYSTEMS:  CONSTITUTIONAL: No fever, weight loss, chills, shakes, or fat  RESPIRATORY: No cough, wheezing, hemoptysis, or shortness of breath  CARDIOVASCULAR: No chest pain, dyspnea, palpitations, dizziness, syncope, paroxysmal nocturnal dyspnea, orthopnea, or arm or leg swelling  GASTROINTESTINAL: No abdominal  or epigastric pain, nausea, vomiting, hematemesis, diarrhea, constipation, melena or bright red bloo  NEUROLOGICAL: No headaches, memory loss, slurred speech, limb weakness, loss of strength, numbness, or tremors  SKIN: No itching, burning, rashes, or lesions  ENDOCRINE: No heat or cold intolerance, or hair loss  MUSCULOSKELETAL: No joint pain or swelling, muscle, back, or extremity pain  HEME/LYMPH: No easy bruising or bleeding gums  ALLERY AND IMMUNOLOGIC: No hives or rash.    Vital Signs Last 24 Hrs  T(C): 36.4 (29 May 2019 20:58), Max: 36.8 (29 May 2019 12:21)  T(F): 97.6 (29 May 2019 20:58), Max: 98.2 (29 May 2019 12:21)  HR: 68 (29 May 2019 20:58) (62 - 69)  BP: 140/61 (29 May 2019 20:58) (122/65 - 154/64)  BP(mean): --  RR: 14 (29 May 2019 20:58) (14 - 18)  SpO2: 99% (29 May 2019 20:58) (96% - 100%)    PHYSICAL EXAM:  HEAD:  Atraumatic, Normocephalic  EYES: EOMI, PERRLA, conjunctiva and sclera clear  NECK: Supple and normal thyroid.  No JVD or carotid bruit.   HEART: S1, S2 regular , 1/6 soft ejection systolic murmur in mitral area , no thrill and no gallops .  PULMONARY: Bilateral vesicular breathing , few scattered ronchi and few scattered rales are present .  ABDOMEN: Soft nontender and positive bowl sounds   EXTREMITIES:  No clubbing, cyanosis, or pedal  edema  NEUROLOGICAL: AAOX3 , no focal deficit .    LABS:                        10.6   9.61  )-----------( 278      ( 29 May 2019 15:14 )             35.6     05-29    136  |  102  |  66<H>  ----------------------------<  156<H>  6.6<HH>   |  28  |  5.60<H>    Ca    8.8      29 May 2019 13:00          PT/INR - ( 29 May 2019 16:07 )   PT: 14.5 sec;   INR: 1.26 ratio             BNP      EKG:  ECHO:  IMAGING:    Assessment and Plan :     Will continue to follow during hospital course and give further recommendations as needed . Thanks for your referral . if any questions please contact me at office ([phone])cell [phone]) [facility] P.CKaley Gardiner     Patient is a 69y old  Female who presents with a chief complaint of high potassium (29 May 2019 17:07)      HPI:  70yo F with HTN, DM, MI, CAD, ESRD on HD sent to ED for elevated potassium on rountine blood work for ERCP. pt st no complaints. scheduled for HD today.  DR anderson will arrange for HD. pt st no complaints.  daughter in law insists on translating for pt  pt understands some english,   offerred and refused.  patient is being admitted for further work up and treatment. (29 May 2019 16:47)      HPI:    69y Female for Cardiology Consult    PAST MEDICAL & SURGICAL HISTORY:  Chronic pancreatitis  Cholecystitis with cholangitis  Acute urinary retention  Liver abscess  ESRD (end stage renal disease) on dialysis: MWF since 05/2018  CAD (coronary artery disease): s/p stent x 1 in 2007  Diabetes: Type 2 DM  Myocardial infarction: 2007  Hypertension  S/P arteriovenous (AV) fistula creation: 05/17/2019  History of biliary stent insertion: 12/2018  H/O: hysterectomy: 1990      FAMILY HISTORY:  No pertinent family history in first degree relatives      SOCIAL HISTORY:   Alcohol:  Smoking:    Allergies    ertapenem (Urticaria)  Purell (Rash)    Intolerances        MEDICATIONS  (STANDING):  aspirin enteric coated 81 milliGRAM(s) Oral daily  atorvastatin 80 milliGRAM(s) Oral at bedtime  gabapentin 300 milliGRAM(s) Oral two times a day  isosorbide   mononitrate ER Tablet (IMDUR) 60 milliGRAM(s) Oral daily  metoprolol tartrate 50 milliGRAM(s) Oral two times a day  NIFEdipine XL 60 milliGRAM(s) Oral daily    MEDICATIONS  (PRN):      REVIEW OF SYSTEMS:  CONSTITUTIONAL: No fever, weight loss, chills, shakes, or fat  RESPIRATORY: No cough, wheezing, hemoptysis, or shortness of breath  CARDIOVASCULAR: No chest pain, dyspnea, palpitations, dizziness, syncope, paroxysmal nocturnal dyspnea, orthopnea, or arm or leg swelling  GASTROINTESTINAL: No abdominal  or epigastric pain, nausea, vomiting, hematemesis, diarrhea, constipation, melena or bright red bloo  NEUROLOGICAL: No headaches, memory loss, slurred speech, limb weakness, loss of strength, numbness, or tremors  SKIN: No itching, burning, rashes, or lesions  ENDOCRINE: No heat or cold intolerance, or hair loss  MUSCULOSKELETAL: No joint pain or swelling, muscle, back, or extremity pain  HEME/LYMPH: No easy bruising or bleeding gums  ALLERY AND IMMUNOLOGIC: No hives or rash.    Vital Signs Last 24 Hrs  T(C): 36.4 (29 May 2019 20:58), Max: 36.8 (29 May 2019 12:21)  T(F): 97.6 (29 May 2019 20:58), Max: 98.2 (29 May 2019 12:21)  HR: 68 (29 May 2019 20:58) (62 - 69)  BP: 140/61 (29 May 2019 20:58) (122/65 - 154/64)  BP(mean): --  RR: 14 (29 May 2019 20:58) (14 - 18)  SpO2: 99% (29 May 2019 20:58) (96% - 100%)    PHYSICAL EXAM:  HEAD:  Atraumatic, Normocephalic  EYES: EOMI, PERRLA, conjunctiva and sclera clear  NECK: Supple and normal thyroid.  No JVD or carotid bruit.   HEART: S1, S2 regular , 1/6 soft ejection systolic murmur in mitral area , no thrill and no gallops .  PULMONARY: Bilateral vesicular breathing , few scattered ronchi and few scattered rales are present .  ABDOMEN: Soft nontender and positive bowl sounds   EXTREMITIES:  No clubbing, cyanosis, or pedal  edema  NEUROLOGICAL: AAOX3 , no focal deficit .    LABS:                        10.6   9.61  )-----------( 278      ( 29 May 2019 15:14 )             35.6     05-29    136  |  102  |  66<H>  ----------------------------<  156<H>  6.6<HH>   |  28  |  5.60<H>    Ca    8.8      29 May 2019 13:00          PT/INR - ( 29 May 2019 16:07 )   PT: 14.5 sec;   INR: 1.26 ratio           Assessment and Plan :   FULL CONSULT DICTATED .  69 years old female with H/O DM , Hypertension , CAD has hyperkalemia . Cardiac stable so far . Once patient potassium is corrected , patient cardiac wise stable so far and  cleared for gall bladder surgery and endoscopy procedures .   Will continue to follow during hospital course and give further recommendations as needed . Thanks for your referral . if any questions please contact me at office ([phone])cell [phone])

## 2019-05-29 NOTE — ED PROVIDER NOTE - OBJECTIVE STATEMENT
70yo F with HTN, DM, MI, CAD, ESRD on HD sent to ED for elevated potassium on rountine blood work for ERCP. pt st no complaints. scheduled for HD today.  DR anderson will arrange for HD. pt st no complaints.  daughter in law insists on translating for pt  pt understands some english,   offerred and refused.  renal=monica  GI=sideridis 70yo F with HTN, DM, MI, CAD, ESRD on HD sent to ED for elevated potassium on rountine blood work for ERCP. pt st no complaints. scheduled for HD today.  DR anderson will arrange for HD. pt st no complaints.  daughter in law insists on translating for pt  pt understands some english,   offerred and refused.  renal=monica  GI=sideridis  pmd=liv

## 2019-05-29 NOTE — H&P ADULT - NSHPPHYSICALEXAM_GEN_ALL_CORE
· Physical Examination: Gen: Awake, Alert, WD, WN, NAD  	Head:  NC/AT  	Eyes:  PERRL, EOMI, Conjunctiva pink, lids normal, no scleral icterus  	ENT: OP clear, no exudates, no erythema, uvula midline, no upper teeth, moist mucus membranes  	Neck: supple, nontender, no JVD, trachea midline  	Cardiac/CV:  S1 S2, RRR, no M/G/R, + HD catheter right ACW  	Respiratory/Pulm:  CTAB, good air movement, normal resp effort, no wheezes/stridor/retractions/rales/rhonchi  	Gastrointestinal/Abdomen:  Soft, nontender, nondistended, +BS, no rebound/guarding  	Back:  no CVAT, no MLT  	Ext:  warm, well perfused, moving all extremities spontaneously, no peripheral edema, distal pulses intact, no thrill palpated in right forearm graft (awaiting use)  	Skin: intact, no rash  Neuro:  AAOx3, sensation intact, motor 5/5 x 4 extremities, speech clear

## 2019-05-29 NOTE — ED PROVIDER NOTE - CARE PLAN
Principal Discharge DX:	Hyperkalemia  Secondary Diagnosis:	CAD (coronary artery disease)  Secondary Diagnosis:	Diabetes  Secondary Diagnosis:	Hypertension  Secondary Diagnosis:	Myocardial infarction  Secondary Diagnosis:	ESRD (end stage renal disease) on dialysis

## 2019-05-29 NOTE — CONSULT NOTE ADULT - ATTENDING COMMENTS
Above noted. All studies reviewed. Pt definitely represents medical and surgical challenges. She obviously needs ERCP. Cholecystectomy may not be straight forward due to chronicity of biliary issues and multiple instrumentation. Therefore if there are any contraindications, especially in regards to her cardiac status would hold off.   Will continue following and discussing with medicine and subspecialties.

## 2019-05-29 NOTE — CONSULT NOTE ADULT - ASSESSMENT
cbd stent    plan  in and out   ppi once a day  cancel the procedure  will reschedule once stable    plan d/w family
·	ESRD on HD  ·	For ERCP  ·	Diabetes  ·	Hypertension  ·	Hyperkalemia    HD today. To continue HD TIW as scheduled. Low potassium diet. Poor dietary compliance. Fluid removal as tolerated by BP. Dietary and PO fluid restriction.   Monitor BP trend. Titrate BP meds as needed. Salt restriction. Monitor blood sugar levels. Insulin coverage as needed. GI follow up. D/w family at bedside.   Further recommendations pending clinical course. Thank you for the courtesy of this referral.

## 2019-05-29 NOTE — ED PROVIDER NOTE - PHYSICAL EXAMINATION
Gen: Awake, Alert, WD, WN, NAD  Head:  NC/AT  Eyes:  PERRL, EOMI, Conjunctiva pink, lids normal, no scleral icterus  ENT: OP clear, no exudates, no erythema, uvula midline, no upper teeth, moist mucus membranes  Neck: supple, nontender, no JVD, trachea midline  Cardiac/CV:  S1 S2, RRR, no M/G/R, + HD catheter right ACW  Respiratory/Pulm:  CTAB, good air movement, normal resp effort, no wheezes/stridor/retractions/rales/rhonchi  Gastrointestinal/Abdomen:  Soft, nontender, nondistended, +BS, no rebound/guarding  Back:  no CVAT, no MLT  Ext:  warm, well perfused, moving all extremities spontaneously, no peripheral edema, distal pulses intact, no thrill palpated in right forearm graft (awaiting use)  Skin: intact, no rash  Neuro:  AAOx3, sensation intact, motor 5/5 x 4 extremities, speech clear

## 2019-05-29 NOTE — H&P ADULT - NSHPREVIEWOFSYSTEMS_GEN_ALL_CORE
· CONSTITUTIONAL: no fever and no chills.  · EYES: no discharge, no irritation, no pain, no redness, and no visual changes.  · ENMT: Ears: no ear pain and no hearing problems.Nose: no nasal congestion and no nasal drainage.Mouth/Throat: no dysphagia, no hoarseness and no throat pain.Neck: no lumps, no pain, no stiffness and no swollen glands.  · CARDIOVASCULAR: no chest pain and no edema.  · RESPIRATORY: no chest pain, no cough, and no shortness of breath.  · GASTROINTESTINAL: no abdominal pain, no bloating, no constipation, no diarrhea, no nausea and no vomiting.  · MUSCULOSKELETAL: no back pain, no gout, no musculoskeletal pain, no neck pain, and no weakness.  · NEURO: no loss of consciousness, no gait abnormality, no headache, no sensory deficits, and no weakness.

## 2019-05-29 NOTE — ED PROVIDER NOTE - CLINICAL SUMMARY MEDICAL DECISION MAKING FREE TEXT BOX
70yo F with multiple medical problems, scheduled for ERCP today, found to have elevated potassium, labs, EKG, admit, renal consult for HD. hyperkalemia with ESRD

## 2019-05-29 NOTE — ED PROVIDER NOTE - SECONDARY DIAGNOSIS.
CAD (coronary artery disease) Diabetes Hypertension ESRD (end stage renal disease) on dialysis Myocardial infarction

## 2019-05-29 NOTE — ED PROVIDER NOTE - CHPI ED SYMPTOMS NEG
no loss of consciousness/no pain/no vomiting/no decreased eating/drinking/no chills/no fever/no back pain/no dizziness/no headache/no nausea

## 2019-05-29 NOTE — CONSULT NOTE ADULT - SUBJECTIVE AND OBJECTIVE BOX
Patient is a 69y old  Female who presents with a chief complaint of inc potasium (29 May 2019 13:51)    HPI: Patient is a 69y old  Female who presents with a chief complaint of Pt is a 68 yo F with PMH of ESRD on HD (M, W, F), CAD s/p stents 2007, DM 2, HTN, MI (2007), biliary stent, Liver abscess, chronic cholecystitis. Pt was c/o abdominal pain s/p biliary stent insertion in 2018. Pt had f/u GI consult- scheduled for ERCP on 05/29/2019.  Pt denies CP, palpitations, SOB, cough, myalgias, rash. now here for follow up has a potassium of 6.5 and repeat is 6.6.    Renal consult called for ESRD on hemodialysis, hyperkalemia.       PAST MEDICAL HISTORY:  Chronic pancreatitis  Cholecystitis with cholangitis  Acute urinary retention  Liver abscess  ESRD (end stage renal disease) on dialysis  CAD (coronary artery disease)  Diabetes  CRF (chronic renal failure)  Hypertension  Pneumonia  Myocardial infarction  Hypertension  Diabetes      PAST SURGICAL HISTORY:  S/P arteriovenous (AV) fistula creation  History of biliary stent insertion  H/O: hysterectomy      FAMILY HISTORY:  No pertinent family history in first degree relatives      SOCIAL HISTORY: No smoking or alcohol use     Allergies    ertapenem (Urticaria)  Purell (Rash)    Intolerances      Home Medications:  aspirin 81 mg oral delayed release tablet: 1 tab(s) orally once a day (29 May 2019 12:19)  atorvastatin 80 mg oral tablet: 1 tab(s) orally once a day (at bedtime) (29 May 2019 12:19)  gabapentin 300 mg oral capsule: 1 cap(s) orally 2 times a day (29 May 2019 12:19)  isosorbide mononitrate 30 mg oral tablet, extended release: 1 tab(s) orally once a day (at bedtime) (29 May 2019 12:19)  isosorbide mononitrate 60 mg oral tablet, extended release: 1 tab(s) orally once a day (29 May 2019 12:19)  metoprolol tartrate 50 mg oral tablet: 1 tab(s) orally 2 times a day (29 May 2019 12:19)  NIFEdipine 60 mg oral tablet, extended release: 1 tab(s) orally once a day (29 May 2019 12:19)    MEDICATIONS  (STANDING):    MEDICATIONS  (PRN):      REVIEW OF SYSTEMS:  General: NAD  Respiratory: No cough, SOB  Cardiovascular: No CP or Palpitations	  Gastrointestinal: No nausea, Vomiting. No diarrhea  Genitourinary: No urinary complaints	  Musculoskeletal: No new rash or lesions	  all other systems negative    T(F): 98 (05-29-19 @ 13:45), Max: 98.2 (05-29-19 @ 12:21)  HR: 65 (05-29-19 @ 13:45) (65 - 65)  BP: 154/54 (05-29-19 @ 13:45) (154/54 - 154/64)  RR: 14 (05-29-19 @ 13:45) (14 - 14)  SpO2: 100% (05-29-19 @ 13:45) (100% - 100%)  Wt(kg): --    PHYSICAL EXAM:  General: NAD, Rt chest dialysis catheter  Respiratory: b/l air entry  Cardiovascular: S1 S2  Gastrointestinal: soft  Extremities: edema        05-29    136  |  102  |  66<H>  ----------------------------<  156<H>  6.6<HH>   |  28  |  5.60<H>    Ca    8.8      29 May 2019 13:00      Potassium, Serum: 6.6 mmol/L (05-29 @ 13:00)  Blood Urea Nitrogen, Serum: 66 mg/dL (05-29 @ 13:00)  Calcium, Total Serum: 8.8 mg/dL (05-29 @ 13:00)  Potassium, Serum: 6.5 mmol/L (05-29 @ 12:20)      Creatinine, Serum: 5.60 (05-29 @ 13:00)                      I&O's Detail

## 2019-05-30 VITALS
OXYGEN SATURATION: 96 % | RESPIRATION RATE: 16 BRPM | SYSTOLIC BLOOD PRESSURE: 124 MMHG | HEART RATE: 72 BPM | TEMPERATURE: 98 F | DIASTOLIC BLOOD PRESSURE: 65 MMHG

## 2019-05-30 DIAGNOSIS — E11.65 TYPE 2 DIABETES MELLITUS WITH HYPERGLYCEMIA: ICD-10-CM

## 2019-05-30 DIAGNOSIS — K81.9 CHOLECYSTITIS, UNSPECIFIED: ICD-10-CM

## 2019-05-30 LAB
ALBUMIN SERPL ELPH-MCNC: 2.7 G/DL — LOW (ref 3.3–5)
ALP SERPL-CCNC: 126 U/L — HIGH (ref 40–120)
ALT FLD-CCNC: 10 U/L — LOW (ref 12–78)
AMYLASE P1 CFR SERPL: 48 U/L — SIGNIFICANT CHANGE UP (ref 25–115)
AMYLASE P1 CFR SERPL: 49 U/L — SIGNIFICANT CHANGE UP (ref 25–115)
ANION GAP SERPL CALC-SCNC: 7 MMOL/L — SIGNIFICANT CHANGE UP (ref 5–17)
AST SERPL-CCNC: 16 U/L — SIGNIFICANT CHANGE UP (ref 15–37)
BILIRUB SERPL-MCNC: 0.4 MG/DL — SIGNIFICANT CHANGE UP (ref 0.2–1.2)
BUN SERPL-MCNC: 27 MG/DL — HIGH (ref 7–23)
CALCIUM SERPL-MCNC: 8 MG/DL — LOW (ref 8.5–10.1)
CHLORIDE SERPL-SCNC: 99 MMOL/L — SIGNIFICANT CHANGE UP (ref 96–108)
CHOLEST SERPL-MCNC: 100 MG/DL — SIGNIFICANT CHANGE UP (ref 10–199)
CO2 SERPL-SCNC: 29 MMOL/L — SIGNIFICANT CHANGE UP (ref 22–31)
CREAT SERPL-MCNC: 3.4 MG/DL — HIGH (ref 0.5–1.3)
GLUCOSE SERPL-MCNC: 215 MG/DL — HIGH (ref 70–99)
HBA1C BLD-MCNC: 8.5 % — HIGH (ref 4–5.6)
HCT VFR BLD CALC: 35.9 % — SIGNIFICANT CHANGE UP (ref 34.5–45)
HCV AB S/CO SERPL IA: 0.15 S/CO — SIGNIFICANT CHANGE UP (ref 0–0.99)
HCV AB SERPL-IMP: SIGNIFICANT CHANGE UP
HDLC SERPL-MCNC: 37 MG/DL — LOW
HGB BLD-MCNC: 10.9 G/DL — LOW (ref 11.5–15.5)
LIDOCAIN IGE QN: 300 U/L — SIGNIFICANT CHANGE UP (ref 73–393)
LIDOCAIN IGE QN: 343 U/L — SIGNIFICANT CHANGE UP (ref 73–393)
LIPID PNL WITH DIRECT LDL SERPL: 50 MG/DL — SIGNIFICANT CHANGE UP
MAGNESIUM SERPL-MCNC: 2 MG/DL — SIGNIFICANT CHANGE UP (ref 1.6–2.6)
MCHC RBC-ENTMCNC: 28.2 PG — SIGNIFICANT CHANGE UP (ref 27–34)
MCHC RBC-ENTMCNC: 30.4 GM/DL — LOW (ref 32–36)
MCV RBC AUTO: 92.8 FL — SIGNIFICANT CHANGE UP (ref 80–100)
NRBC # BLD: 0 /100 WBCS — SIGNIFICANT CHANGE UP (ref 0–0)
PLATELET # BLD AUTO: 255 K/UL — SIGNIFICANT CHANGE UP (ref 150–400)
POTASSIUM SERPL-MCNC: 4.7 MMOL/L — SIGNIFICANT CHANGE UP (ref 3.5–5.3)
POTASSIUM SERPL-SCNC: 4.7 MMOL/L — SIGNIFICANT CHANGE UP (ref 3.5–5.3)
PROT SERPL-MCNC: 7.9 G/DL — SIGNIFICANT CHANGE UP (ref 6–8.3)
RBC # BLD: 3.87 M/UL — SIGNIFICANT CHANGE UP (ref 3.8–5.2)
RBC # FLD: 16.1 % — HIGH (ref 10.3–14.5)
SODIUM SERPL-SCNC: 135 MMOL/L — SIGNIFICANT CHANGE UP (ref 135–145)
TOTAL CHOLESTEROL/HDL RATIO MEASUREMENT: 2.7 RATIO — LOW (ref 3.3–7.1)
TRIGL SERPL-MCNC: 65 MG/DL — SIGNIFICANT CHANGE UP (ref 10–149)
TSH SERPL-MCNC: 2.36 UIU/ML — SIGNIFICANT CHANGE UP (ref 0.36–3.74)
WBC # BLD: 9.64 K/UL — SIGNIFICANT CHANGE UP (ref 3.8–10.5)
WBC # FLD AUTO: 9.64 K/UL — SIGNIFICANT CHANGE UP (ref 3.8–10.5)

## 2019-05-30 PROCEDURE — 99261: CPT

## 2019-05-30 PROCEDURE — 93005 ELECTROCARDIOGRAM TRACING: CPT

## 2019-05-30 PROCEDURE — 85610 PROTHROMBIN TIME: CPT

## 2019-05-30 PROCEDURE — 82150 ASSAY OF AMYLASE: CPT

## 2019-05-30 PROCEDURE — 80048 BASIC METABOLIC PNL TOTAL CA: CPT

## 2019-05-30 PROCEDURE — 80053 COMPREHEN METABOLIC PANEL: CPT

## 2019-05-30 PROCEDURE — 74176 CT ABD & PELVIS W/O CONTRAST: CPT

## 2019-05-30 PROCEDURE — 84443 ASSAY THYROID STIM HORMONE: CPT

## 2019-05-30 PROCEDURE — 80061 LIPID PANEL: CPT

## 2019-05-30 PROCEDURE — 85027 COMPLETE CBC AUTOMATED: CPT

## 2019-05-30 PROCEDURE — 83735 ASSAY OF MAGNESIUM: CPT

## 2019-05-30 PROCEDURE — 86803 HEPATITIS C AB TEST: CPT

## 2019-05-30 PROCEDURE — 71045 X-RAY EXAM CHEST 1 VIEW: CPT

## 2019-05-30 PROCEDURE — 36415 COLL VENOUS BLD VENIPUNCTURE: CPT

## 2019-05-30 PROCEDURE — 83036 HEMOGLOBIN GLYCOSYLATED A1C: CPT

## 2019-05-30 PROCEDURE — 82962 GLUCOSE BLOOD TEST: CPT

## 2019-05-30 PROCEDURE — 83690 ASSAY OF LIPASE: CPT

## 2019-05-30 PROCEDURE — 84132 ASSAY OF SERUM POTASSIUM: CPT

## 2019-05-30 PROCEDURE — 74176 CT ABD & PELVIS W/O CONTRAST: CPT | Mod: 26

## 2019-05-30 PROCEDURE — 99285 EMERGENCY DEPT VISIT HI MDM: CPT | Mod: 25

## 2019-05-30 RX ORDER — DEXTROSE 50 % IN WATER 50 %
15 SYRINGE (ML) INTRAVENOUS ONCE
Refills: 0 | Status: DISCONTINUED | OUTPATIENT
Start: 2019-05-30 | End: 2019-05-30

## 2019-05-30 RX ORDER — GLUCAGON INJECTION, SOLUTION 0.5 MG/.1ML
1 INJECTION, SOLUTION SUBCUTANEOUS ONCE
Refills: 0 | Status: DISCONTINUED | OUTPATIENT
Start: 2019-05-30 | End: 2019-05-30

## 2019-05-30 RX ORDER — DEXTROSE 50 % IN WATER 50 %
25 SYRINGE (ML) INTRAVENOUS ONCE
Refills: 0 | Status: DISCONTINUED | OUTPATIENT
Start: 2019-05-30 | End: 2019-05-30

## 2019-05-30 RX ORDER — DEXTROSE 50 % IN WATER 50 %
12.5 SYRINGE (ML) INTRAVENOUS ONCE
Refills: 0 | Status: DISCONTINUED | OUTPATIENT
Start: 2019-05-30 | End: 2019-05-30

## 2019-05-30 RX ORDER — SODIUM CHLORIDE 9 MG/ML
1000 INJECTION, SOLUTION INTRAVENOUS
Refills: 0 | Status: DISCONTINUED | OUTPATIENT
Start: 2019-05-30 | End: 2019-05-30

## 2019-05-30 RX ADMIN — Medication 81 MILLIGRAM(S): at 13:05

## 2019-05-30 RX ADMIN — Medication 50 MILLIGRAM(S): at 05:05

## 2019-05-30 RX ADMIN — Medication 60 MILLIGRAM(S): at 05:09

## 2019-05-30 RX ADMIN — ISOSORBIDE MONONITRATE 60 MILLIGRAM(S): 60 TABLET, EXTENDED RELEASE ORAL at 13:05

## 2019-05-30 NOTE — PROGRESS NOTE ADULT - SUBJECTIVE AND OBJECTIVE BOX
pt seen and examined  seen by PA and separate surgeon this morning  Presented for ERCP yesterday but cancelled due to electrolyte imbalance   will hold off on lap kevin at this time
INTERVAL HPI/OVERNIGHT EVENTS:  pt seen and examined  resting in bed  denies abd pain    MEDICATIONS  (STANDING):  aspirin enteric coated 81 milliGRAM(s) Oral daily  atorvastatin 80 milliGRAM(s) Oral at bedtime  dextrose 5%. 1000 milliLiter(s) (50 mL/Hr) IV Continuous <Continuous>  dextrose 50% Injectable 12.5 Gram(s) IV Push once  dextrose 50% Injectable 25 Gram(s) IV Push once  dextrose 50% Injectable 25 Gram(s) IV Push once  gabapentin 300 milliGRAM(s) Oral two times a day  isosorbide   mononitrate ER Tablet (IMDUR) 60 milliGRAM(s) Oral daily  metoprolol tartrate 50 milliGRAM(s) Oral two times a day  NIFEdipine XL 60 milliGRAM(s) Oral daily    MEDICATIONS  (PRN):  dextrose 40% Gel 15 Gram(s) Oral once PRN Blood Glucose LESS THAN 70 milliGRAM(s)/deciLiter  glucagon  Injectable 1 milliGRAM(s) IntraMuscular once PRN Glucose <70 milliGRAM(s)/deciLiter      Allergies    ertapenem (Urticaria)  Purell (Rash)    Intolerances        Review of Systems:    General:  No wt loss, fevers, chills, night sweats, fatigue   Eyes:  Good vision, no reported pain  ENT:  No sore throat, pain, runny nose, dysphagia  CV:  No pain, palpitations, hypo/hypertension  Resp:  No dyspnea, cough, tachypnea, wheezing  GI:  No pain, No nausea, No vomiting, No diarrhea, No constipation, No weight loss, No fever, No pruritis, No rectal bleeding, No melena, No dysphagia  :  No pain, bleeding, incontinence, nocturia  Muscle:  No pain, weakness  Neuro:  No weakness, tingling, memory problems  Psych:  No fatigue, insomnia, mood problems, depression  Endocrine:  No polyuria, polydypsia, cold/heat intolerance  Heme:  No petechiae, ecchymosis, easy bruisability  Skin:  No rash, tattoos, scars, edema      Vital Signs Last 24 Hrs  T(C): 36.7 (30 May 2019 05:01), Max: 36.8 (29 May 2019 12:21)  T(F): 98.1 (30 May 2019 05:01), Max: 98.2 (29 May 2019 12:21)  HR: 71 (30 May 2019 05:01) (62 - 74)  BP: 151/70 (30 May 2019 05:01) (122/65 - 154/64)  BP(mean): --  RR: 15 (30 May 2019 05:01) (14 - 18)  SpO2: 98% (30 May 2019 05:01) (96% - 100%)    PHYSICAL EXAM:    Constitutional: NAD  HEENT: ncat  Neck: No LAD  Respiratory:  dec bs  Cardiovascular: S1 and S2, RRR  Gastrointestinal: obese soft nt nd  Extremities: No peripheral edema  Vascular: 2+ peripheral pulses  Neurological: Awake alert  Skin: No rashes      LABS:                        10.9   9.64  )-----------( 255      ( 30 May 2019 01:37 )             35.9     05-30    135  |  99  |  27<H>  ----------------------------<  215<H>  4.7   |  29  |  3.40<H>    Ca    8.0<L>      30 May 2019 01:37  Mg     2.0     05-30    TPro  7.9  /  Alb  2.7<L>  /  TBili  0.4  /  DBili  x   /  AST  16  /  ALT  10<L>  /  AlkPhos  126<H>  05-30    PT/INR - ( 29 May 2019 16:07 )   PT: 14.5 sec;   INR: 1.26 ratio               RADIOLOGY & ADDITIONAL TESTS:
Patient is a 69y old  Female who presents with a chief complaint of high potassium (30 May 2019 11:47)    Patient seen in follow up for ESRD on HD.     PAST MEDICAL HISTORY:  Chronic pancreatitis  Cholecystitis with cholangitis  Acute urinary retention  Liver abscess  ESRD (end stage renal disease) on dialysis  CAD (coronary artery disease)  Diabetes  CRF (chronic renal failure)  Hypertension  Pneumonia  Myocardial infarction  Hypertension  Diabetes    MEDICATIONS  (STANDING):  aspirin enteric coated 81 milliGRAM(s) Oral daily  atorvastatin 80 milliGRAM(s) Oral at bedtime  dextrose 5%. 1000 milliLiter(s) (50 mL/Hr) IV Continuous <Continuous>  dextrose 50% Injectable 12.5 Gram(s) IV Push once  dextrose 50% Injectable 25 Gram(s) IV Push once  dextrose 50% Injectable 25 Gram(s) IV Push once  gabapentin 300 milliGRAM(s) Oral two times a day  isosorbide   mononitrate ER Tablet (IMDUR) 60 milliGRAM(s) Oral daily  metoprolol tartrate 50 milliGRAM(s) Oral two times a day  NIFEdipine XL 60 milliGRAM(s) Oral daily    MEDICATIONS  (PRN):  dextrose 40% Gel 15 Gram(s) Oral once PRN Blood Glucose LESS THAN 70 milliGRAM(s)/deciLiter  glucagon  Injectable 1 milliGRAM(s) IntraMuscular once PRN Glucose <70 milliGRAM(s)/deciLiter    T(C): 36.7 (05-30-19 @ 05:01), Max: 36.8 (05-29-19 @ 12:21)  HR: 71 (05-30-19 @ 05:01) (62 - 74)  BP: 151/70 (05-30-19 @ 05:01) (122/65 - 154/64)  RR: 15 (05-30-19 @ 05:01) (14 - 18)  SpO2: 98% (05-30-19 @ 05:01) (96% - 100%)  Wt(kg): --  I&O's Detail    29 May 2019 07:01  -  30 May 2019 07:00  --------------------------------------------------------  IN:  Total IN: 0 mL    OUT:    Other: 2000 mL  Total OUT: 2000 mL    Total NET: -2000 mL          PHYSICAL EXAM:  General: NAD, Dialysis catheter  Respiratory: b/l air entry  Cardiovascular: S1 S2  Gastrointestinal: soft  Extremities:  no edema                          10.9   9.64  )-----------( 255      ( 30 May 2019 01:37 )             35.9     05-30    135  |  99  |  27<H>  ----------------------------<  215<H>  4.7   |  29  |  3.40<H>    Ca    8.0<L>      30 May 2019 01:37  Mg     2.0     05-30    TPro  7.9  /  Alb  2.7<L>  /  TBili  0.4  /  DBili  x   /  AST  16  /  ALT  10<L>  /  AlkPhos  126<H>  05-30        LIVER FUNCTIONS - ( 30 May 2019 01:37 )  Alb: 2.7 g/dL / Pro: 7.9 g/dL / ALK PHOS: 126 U/L / ALT: 10 U/L / AST: 16 U/L / GGT: x               Sodium, Serum: 135 (05-30 @ 01:37)  Sodium, Serum: 136 (05-29 @ 13:00)    Creatinine, Serum: 3.40 (05-30 @ 01:37)  Creatinine, Serum: 5.60 (05-29 @ 13:00)    Potassium, Serum: 4.7 (05-30 @ 01:37)  Potassium, Serum: 6.6 (05-29 @ 13:00)  Potassium, Serum: 6.5 (05-29 @ 12:20)    Hemoglobin: 10.9 (05-30 @ 01:37)  Hemoglobin: 10.6 (05-29 @ 15:14)
Patient is a 69y old  Female who presents with a chief complaint of high potassium (30 May 2019 14:08)      INTERVAL /OVERNIGHT EVENTS: feeling hungry    MEDICATIONS  (STANDING):  aspirin enteric coated 81 milliGRAM(s) Oral daily  atorvastatin 80 milliGRAM(s) Oral at bedtime  dextrose 5%. 1000 milliLiter(s) (50 mL/Hr) IV Continuous <Continuous>  dextrose 50% Injectable 12.5 Gram(s) IV Push once  dextrose 50% Injectable 25 Gram(s) IV Push once  dextrose 50% Injectable 25 Gram(s) IV Push once  gabapentin 300 milliGRAM(s) Oral two times a day  isosorbide   mononitrate ER Tablet (IMDUR) 60 milliGRAM(s) Oral daily  metoprolol tartrate 50 milliGRAM(s) Oral two times a day  NIFEdipine XL 60 milliGRAM(s) Oral daily    MEDICATIONS  (PRN):  dextrose 40% Gel 15 Gram(s) Oral once PRN Blood Glucose LESS THAN 70 milliGRAM(s)/deciLiter  glucagon  Injectable 1 milliGRAM(s) IntraMuscular once PRN Glucose <70 milliGRAM(s)/deciLiter      Allergies    ertapenem (Urticaria)  Purell (Rash)    Intolerances        REVIEW OF SYSTEMS:  CONSTITUTIONAL: No fever, weight loss, or fatigue  EYES: No eye pain, visual disturbances, or discharge  ENMT:  No difficulty hearing, tinnitus, vertigo; No sinus or throat pain  NECK: No pain or stiffness  RESPIRATORY: No cough, wheezing, chills or hemoptysis; No shortness of breath  CARDIOVASCULAR: No chest pain, palpitations, dizziness, or leg swelling  GASTROINTESTINAL: No abdominal or epigastric pain. No nausea, vomiting, or hematemesis; No diarrhea or constipation. No melena or hematochezia.  GENITOURINARY: No dysuria, frequency, hematuria, or incontinence  NEUROLOGICAL: No headaches, memory loss, loss of strength, numbness, or tremors  SKIN: No itching, burning, rashes, or lesions   LYMPH NODES: No enlarged glands  ENDOCRINE: No heat or cold intolerance; No hair loss; No polydipsia or polyuria  MUSCULOSKELETAL: No joint pain or swelling; No muscle, back, or extremity pain  PSYCHIATRIC: No depression, anxiety, mood swings, or difficulty sleeping  HEME/LYMPH: No easy bruising, or bleeding gums  ALLERGY AND IMMUNOLOGIC: No hives or eczema    Vital Signs Last 24 Hrs  T(C): 36.6 (30 May 2019 12:45), Max: 36.7 (30 May 2019 05:01)  T(F): 97.8 (30 May 2019 12:45), Max: 98.1 (30 May 2019 05:01)  HR: 72 (30 May 2019 12:45) (68 - 74)  BP: 124/65 (30 May 2019 12:45) (124/65 - 151/77)  BP(mean): --  RR: 16 (30 May 2019 12:45) (14 - 18)  SpO2: 96% (30 May 2019 12:45) (96% - 99%)    PHYSICAL EXAM:  GENERAL: NAD, well-groomed, well-developed  HEAD:  Atraumatic, Normocephalic  EYES: EOMI, PERRLA, conjunctiva and sclera clear  ENMT: No tonsillar erythema, exudates, or enlargement; Moist mucous membranes, Good dentition, No lesions  NECK: Supple, No JVD, Normal thyroid  NERVOUS SYSTEM:  Alert & Oriented X3, Good concentration; Motor Strength 5/5 B/L upper and lower extremities; DTRs 2+ intact and symmetric  CHEST/LUNG: Clear to auscultation bilaterally; No rales, rhonchi, wheezing, or rubs  HEART: Regular rate and rhythm; No murmurs, rubs, or gallops  ABDOMEN: Soft, Nontender, Nondistended; Bowel sounds present  EXTREMITIES:  2+ Peripheral Pulses, No clubbing, cyanosis, or edema  LYMPH: No lymphadenopathy noted  SKIN: No rashes or lesions    LABS:                        10.9   9.64  )-----------( 255      ( 30 May 2019 01:37 )             35.9     30 May 2019 01:37    135    |  99     |  27     ----------------------------<  215    4.7     |  29     |  3.40     Ca    8.0        30 May 2019 01:37  Mg     2.0       30 May 2019 01:37    TPro  7.9    /  Alb  2.7    /  TBili  0.4    /  DBili  x      /  AST  16     /  ALT  10     /  AlkPhos  126    30 May 2019 01:37    PT/INR - ( 29 May 2019 16:07 )   PT: 14.5 sec;   INR: 1.26 ratio             CAPILLARY BLOOD GLUCOSE      POCT Blood Glucose.: 94 mg/dL (29 May 2019 16:54)      RADIOLOGY & ADDITIONAL TESTS:    Notes Reviewed:  [x ] YES  [ ] NO    Care Discussed with Consultants/Other Providers [x ] YES  [ ] NO

## 2019-05-30 NOTE — CONSULT NOTE ADULT - SUBJECTIVE AND OBJECTIVE BOX
Patient is a 69y old  Female who presents with a chief complaint of high potassium (30 May 2019 11:35)        INTERVAL HPI/OVERNIGHT EVENTS:    MEDICATIONS  (STANDING):  aspirin enteric coated 81 milliGRAM(s) Oral daily  atorvastatin 80 milliGRAM(s) Oral at bedtime  dextrose 5%. 1000 milliLiter(s) (50 mL/Hr) IV Continuous <Continuous>  dextrose 50% Injectable 12.5 Gram(s) IV Push once  dextrose 50% Injectable 25 Gram(s) IV Push once  dextrose 50% Injectable 25 Gram(s) IV Push once  gabapentin 300 milliGRAM(s) Oral two times a day  isosorbide   mononitrate ER Tablet (IMDUR) 60 milliGRAM(s) Oral daily  metoprolol tartrate 50 milliGRAM(s) Oral two times a day  NIFEdipine XL 60 milliGRAM(s) Oral daily    MEDICATIONS  (PRN):  dextrose 40% Gel 15 Gram(s) Oral once PRN Blood Glucose LESS THAN 70 milliGRAM(s)/deciLiter  glucagon  Injectable 1 milliGRAM(s) IntraMuscular once PRN Glucose <70 milliGRAM(s)/deciLiter      Allergies    ertapenem (Urticaria)  Purell (Rash)    Intolerances          Vital Signs Last 24 Hrs  T(C): 36.7 (30 May 2019 05:01), Max: 36.8 (29 May 2019 12:21)  T(F): 98.1 (30 May 2019 05:01), Max: 98.2 (29 May 2019 12:21)  HR: 71 (30 May 2019 05:01) (62 - 74)  BP: 151/70 (30 May 2019 05:01) (122/65 - 154/64)  BP(mean): --  RR: 15 (30 May 2019 05:01) (14 - 18)  SpO2: 98% (30 May 2019 05:01) (96% - 100%)    General: WN/WD NAD  Respiratory: CTA B/L  CV: RRR, S1S2, no murmurs, rubs or gallops  Abdominal: Soft, NT, ND +BS, Last BM  Extremities: No edema, + peripheral pulses    LABS:                        10.9   9.64  )-----------( 255      ( 30 May 2019 01:37 )             35.9     05-30    135  |  99  |  27<H>  ----------------------------<  215<H>  4.7   |  29  |  3.40<H>    Ca    8.0<L>      30 May 2019 01:37  Mg     2.0     05-30    TPro  7.9  /  Alb  2.7<L>  /  TBili  0.4  /  DBili  x   /  AST  16  /  ALT  10<L>  /  AlkPhos  126<H>  05-30    CAPILLARY BLOOD GLUCOSE      POCT Blood Glucose.: 94 mg/dL (29 May 2019 16:54)  POCT Blood Glucose.: 136 mg/dL (29 May 2019 12:13)          RADIOLOGY & ADDITIONAL TESTS:

## 2019-05-30 NOTE — CONSULT NOTE ADULT - PROBLEM SELECTOR RECOMMENDATION 9
start fs bg monitoring w/ low dose humalog scale coverage  goal bg 100-180 in hosp setting  check hba1c  cont cons cho diet

## 2019-05-30 NOTE — ED ADULT NURSE REASSESSMENT NOTE - NS ED NURSE REASSESS COMMENT FT1
explained to patient about having the ct ascan and drinking the contrast thru  Number [id number]
[place].  phone used, ID #[id number]. Patient made aware and voiced understanding. Denies needs at this time. Patient resting. Comfort and safety maintained. Will continue to monitor. Primary nurse aware.
Pt. requesting to have hair placed in ponytail.  phone. See flow sheet.
Assumed care for pt awake alert and oriented x  3. ISSA skin intact. Vss, afebrile. Denies any this discomfort at this time. will continue to monitor

## 2019-05-30 NOTE — PROGRESS NOTE ADULT - ASSESSMENT
·	ESRD on HD  ·	Diabetes  ·	Hypertension  ·	Hyperkalemia    s/p HD yesterday. To continue HD TIW as scheduled. Low potassium diet. Poor dietary compliance. Fluid removal as tolerated by BP. Dietary and PO fluid restriction.   Monitor BP trend. Titrate BP meds as needed. Salt restriction. Monitor blood sugar levels. Insulin coverage as needed. GI follow up. D/c planning if no GI intervention planned.

## 2019-05-30 NOTE — PROGRESS NOTE ADULT - PROBLEM SELECTOR PLAN 1
chronic; ct reviewed  as pt asymptomatic will plan for op ercp stent removal w simultaneous ccy  rec ppi qd  no gi objection to diet and dc planning  dw sx; attg dw family      plan dw attg and agreeable

## 2019-06-27 ENCOUNTER — OUTPATIENT (OUTPATIENT)
Dept: OUTPATIENT SERVICES | Facility: HOSPITAL | Age: 69
LOS: 1 days | End: 2019-06-27
Payer: MEDICARE

## 2019-06-27 VITALS
RESPIRATION RATE: 14 BRPM | OXYGEN SATURATION: 96 % | TEMPERATURE: 98 F | WEIGHT: 179.02 LBS | DIASTOLIC BLOOD PRESSURE: 64 MMHG | HEART RATE: 64 BPM | SYSTOLIC BLOOD PRESSURE: 113 MMHG | HEIGHT: 64 IN

## 2019-06-27 DIAGNOSIS — N18.6 END STAGE RENAL DISEASE: ICD-10-CM

## 2019-06-27 DIAGNOSIS — Z98.890 OTHER SPECIFIED POSTPROCEDURAL STATES: Chronic | ICD-10-CM

## 2019-06-27 DIAGNOSIS — K80.50 CALCULUS OF BILE DUCT WITHOUT CHOLANGITIS OR CHOLECYSTITIS WITHOUT OBSTRUCTION: ICD-10-CM

## 2019-06-27 DIAGNOSIS — K80.20 CALCULUS OF GALLBLADDER WITHOUT CHOLECYSTITIS WITHOUT OBSTRUCTION: ICD-10-CM

## 2019-06-27 DIAGNOSIS — Z90.710 ACQUIRED ABSENCE OF BOTH CERVIX AND UTERUS: Chronic | ICD-10-CM

## 2019-06-27 DIAGNOSIS — Z95.5 PRESENCE OF CORONARY ANGIOPLASTY IMPLANT AND GRAFT: ICD-10-CM

## 2019-06-27 LAB
ANION GAP SERPL CALC-SCNC: 11 MMOL/L — SIGNIFICANT CHANGE UP (ref 5–17)
BUN SERPL-MCNC: 38 MG/DL — HIGH (ref 7–23)
CALCIUM SERPL-MCNC: 9 MG/DL — SIGNIFICANT CHANGE UP (ref 8.4–10.5)
CHLORIDE SERPL-SCNC: 95 MMOL/L — LOW (ref 96–108)
CO2 SERPL-SCNC: 30 MMOL/L — SIGNIFICANT CHANGE UP (ref 22–31)
CREAT SERPL-MCNC: 4.22 MG/DL — HIGH (ref 0.5–1.3)
GLUCOSE SERPL-MCNC: 239 MG/DL — HIGH (ref 70–99)
POTASSIUM SERPL-MCNC: 5.8 MMOL/L — HIGH (ref 3.5–5.3)
POTASSIUM SERPL-SCNC: 5.8 MMOL/L — HIGH (ref 3.5–5.3)
SODIUM SERPL-SCNC: 136 MMOL/L — SIGNIFICANT CHANGE UP (ref 135–145)

## 2019-06-27 PROCEDURE — G0463: CPT

## 2019-06-27 PROCEDURE — 80048 BASIC METABOLIC PNL TOTAL CA: CPT

## 2019-06-27 NOTE — H&P PST ADULT - NSICDXPROBLEM_GEN_ALL_CORE_FT
PROBLEM DIAGNOSES  Problem: Stented coronary artery  Assessment and Plan: continue aspirin for coronary stent protection    Problem: ESRD (end stage renal disease) on dialysis  Assessment and Plan: pt to have HD the day prior to ERCP stent removal procedure  check serum potassium DOS    Problem: Calculus of gallbladder without cholangitis or cholecystitis  Assessment and Plan: ERCP stent removal      R/O PROBLEM DIAGNOSES  Problem: Type 2 diabetes mellitus  Assessment and Plan: pt will check with PCP with insulin plan.  PST recommended to take 5 units basaglar insulin the evening prior to ERCP and take 4 units morning of procedure  check finger stick DOS

## 2019-06-27 NOTE — H&P PST ADULT - HISTORY OF PRESENT ILLNESS
68 yo female. PMH T2DM (last A1C=8.5), diabetic neuropathy, CAD, MI (s/p coronary stent x1 2007), ESRD (HD TIW MWF). Nov 2018- pt was diagnosed with acute cholecystitis and liver abscess causing sepsis, s/p biliary stent placement, IV antibiotics treatment.  The liver and sepsis had since resolved, plan= remove biliary stent, then surgical removal of gallbladder.  In May 2019, pt was scheduled for biliary stent removal via ERCP at [facility], however procedure was cancelled due hyperkalemia, K=6.5.  Hyperkalemia was treated, now pt is rescheduled for biliary stent removal on 7/2.  **PSH: 5/17/2019- LUE AV fistula placement, pt is a somewhat poor historian, she stated she's having a phase 2 procudure of the placement of LUE AV fistula on 7/1.**

## 2019-06-27 NOTE — H&P PST ADULT - ACTIVITY
pt activity level limited by lethargy and neuropathy of feet.  pt is able to walks around in the house, light dusting, walk up 1 flight of stairs

## 2019-06-27 NOTE — H&P PST ADULT - NSICDXPASTSURGICALHX_GEN_ALL_CORE_FT
PAST SURGICAL HISTORY:  H/O: hysterectomy 1990    History of biliary stent insertion 12/2018    S/P arteriovenous (AV) fistula creation 05/17/2019 PAST SURGICAL HISTORY:  H/O: hysterectomy 1990    History of biliary stent insertion 11/2018    S/P arteriovenous (AV) fistula creation 05/17/2019

## 2019-06-27 NOTE — H&P PST ADULT - SOURCE OF INFORMATION, PROFILE
patient/family/pt speaks Roberto, offered telephone , pt refused, requested daughter in law, Tyesha, to translate pt speaks Roberto, offered telephone , pt refused, requested daughter in law, Tia, to translate/patient/family

## 2019-06-27 NOTE — H&P PST ADULT - REASON FOR ADMISSION
"She's having ERCP to remove biliary stent. She's had it for 6 months.  They're going to remove the stent first and have surgery to remove her gallbladder."

## 2019-06-27 NOTE — H&P PST ADULT - NSICDXPASTMEDICALHX_GEN_ALL_CORE_FT
PAST MEDICAL HISTORY:  Acute urinary retention     CAD (coronary artery disease) s/p stent x 1 in 2007    Cholecystitis with cholangitis     Chronic pancreatitis     Diabetes Type 2 DM    ESRD (end stage renal disease) on dialysis MWF since 05/2018    Hypertension     Liver abscess resolved    Myocardial infarction 2007 PAST MEDICAL HISTORY:  Acute urinary retention     CAD (coronary artery disease) s/p stent x 1 in 2007    Cholecystitis with cholangitis     Chronic pancreatitis     Diabetes Type 2 DM    ESRD (end stage renal disease) on dialysis MWF since 05/2018    H/O diabetic neuropathy     Hypertension     Liver abscess resolved    Myocardial infarction 2007    PAF (paroxysmal atrial fibrillation) 1 episode in 11/2018 while hospitalized for acute cholecystitis

## 2019-07-02 ENCOUNTER — OUTPATIENT (OUTPATIENT)
Dept: OUTPATIENT SERVICES | Facility: HOSPITAL | Age: 69
LOS: 1 days | End: 2019-07-02
Payer: MEDICARE

## 2019-07-02 ENCOUNTER — APPOINTMENT (OUTPATIENT)
Dept: GASTROENTEROLOGY | Facility: HOSPITAL | Age: 69
End: 2019-07-02

## 2019-07-02 ENCOUNTER — RESULT REVIEW (OUTPATIENT)
Age: 69
End: 2019-07-02

## 2019-07-02 DIAGNOSIS — Z98.890 OTHER SPECIFIED POSTPROCEDURAL STATES: Chronic | ICD-10-CM

## 2019-07-02 DIAGNOSIS — K80.50 CALCULUS OF BILE DUCT WITHOUT CHOLANGITIS OR CHOLECYSTITIS WITHOUT OBSTRUCTION: ICD-10-CM

## 2019-07-02 DIAGNOSIS — Z90.710 ACQUIRED ABSENCE OF BOTH CERVIX AND UTERUS: Chronic | ICD-10-CM

## 2019-07-02 PROCEDURE — 43275 ERCP REMOVE FORGN BODY DUCT: CPT | Mod: GC

## 2019-07-02 PROCEDURE — 43261 ENDO CHOLANGIOPANCREATOGRAPH: CPT

## 2019-07-02 PROCEDURE — 88312 SPECIAL STAINS GROUP 1: CPT

## 2019-07-02 PROCEDURE — 88305 TISSUE EXAM BY PATHOLOGIST: CPT

## 2019-07-02 PROCEDURE — 74328 X-RAY BILE DUCT ENDOSCOPY: CPT | Mod: 26,GC

## 2019-07-02 PROCEDURE — 43275 ERCP REMOVE FORGN BODY DUCT: CPT

## 2019-07-02 PROCEDURE — 82962 GLUCOSE BLOOD TEST: CPT

## 2019-07-02 PROCEDURE — 88305 TISSUE EXAM BY PATHOLOGIST: CPT | Mod: 26

## 2019-07-02 PROCEDURE — 88312 SPECIAL STAINS GROUP 1: CPT | Mod: 26

## 2019-07-02 PROCEDURE — 43239 EGD BIOPSY SINGLE/MULTIPLE: CPT | Mod: GC,59

## 2019-07-02 PROCEDURE — 43264 ERCP REMOVE DUCT CALCULI: CPT | Mod: GC,59

## 2019-07-05 PROBLEM — K75.0 ABSCESS OF LIVER: Chronic | Status: ACTIVE | Noted: 2019-01-02

## 2019-07-05 PROBLEM — I48.0 PAROXYSMAL ATRIAL FIBRILLATION: Chronic | Status: ACTIVE | Noted: 2019-06-27

## 2019-07-05 PROBLEM — Z86.39 PERSONAL HISTORY OF OTHER ENDOCRINE, NUTRITIONAL AND METABOLIC DISEASE: Chronic | Status: ACTIVE | Noted: 2019-06-27

## 2019-07-15 ENCOUNTER — APPOINTMENT (OUTPATIENT)
Dept: SURGERY | Facility: CLINIC | Age: 69
End: 2019-07-15
Payer: MEDICARE

## 2019-07-15 VITALS
SYSTOLIC BLOOD PRESSURE: 122 MMHG | HEART RATE: 66 BPM | DIASTOLIC BLOOD PRESSURE: 72 MMHG | TEMPERATURE: 98.1 F | HEIGHT: 65 IN | BODY MASS INDEX: 29.32 KG/M2 | WEIGHT: 176 LBS

## 2019-07-15 DIAGNOSIS — Z71.9 COUNSELING, UNSPECIFIED: ICD-10-CM

## 2019-07-15 DIAGNOSIS — Z86.39 PERSONAL HISTORY OF OTHER ENDOCRINE, NUTRITIONAL AND METABOLIC DISEASE: ICD-10-CM

## 2019-07-15 DIAGNOSIS — Z78.9 OTHER SPECIFIED HEALTH STATUS: ICD-10-CM

## 2019-07-15 PROCEDURE — 99203 OFFICE O/P NEW LOW 30 MIN: CPT

## 2019-07-15 RX ORDER — METOPROLOL SUCCINATE 50 MG/1
50 TABLET, EXTENDED RELEASE ORAL
Refills: 0 | Status: ACTIVE | COMMUNITY

## 2019-07-15 RX ORDER — ISOSORBIDE DINITRATE 30 MG/1
30 TABLET ORAL
Refills: 0 | Status: ACTIVE | COMMUNITY

## 2019-07-15 RX ORDER — BISMUTH SUBSALICYLATE 262MG/15ML
262 SUSPENSION, ORAL (FINAL DOSE FORM) ORAL
Refills: 0 | Status: ACTIVE | COMMUNITY

## 2019-07-15 RX ORDER — INSULIN GLARGINE 100 [IU]/ML
100 INJECTION, SOLUTION SUBCUTANEOUS
Refills: 0 | Status: ACTIVE | COMMUNITY

## 2019-07-15 RX ORDER — NIFEDIPINE 60 MG
60 TABLET, EXTENDED RELEASE ORAL
Refills: 0 | Status: ACTIVE | COMMUNITY

## 2019-07-15 RX ORDER — ATORVASTATIN CALCIUM 80 MG/1
80 TABLET, FILM COATED ORAL
Refills: 0 | Status: ACTIVE | COMMUNITY

## 2019-07-15 RX ORDER — GABAPENTIN 300 MG
300 TABLET ORAL
Refills: 0 | Status: ACTIVE | COMMUNITY

## 2019-07-15 RX ORDER — OMEPRAZOLE 20 MG/1
20 CAPSULE, DELAYED RELEASE ORAL
Refills: 0 | Status: ACTIVE | COMMUNITY

## 2019-07-15 RX ORDER — FOLIC ACID/VIT B COMPLEX AND C 0.8 MG
0.8 TABLET ORAL
Refills: 0 | Status: ACTIVE | COMMUNITY

## 2019-07-17 NOTE — DISCHARGE NOTE ADULT - PROVIDER RX CONTACT NUMBER
INTERNAL MEDICINE PROGRESS NOTE    CHIEF COMPLAINT     No chief complaint on file.      HPI     Chucho Prado is a 83 y.o. male with depression, htn, hld, ckd stage 3, suprapubic catheter, neurogenic bladder, hypothyroidism, ventral hernia and inguinal hernia, chronic back pain s/p lumbar laminectomy and kyphoplasty  who presents for a follow up visit today.  He is an established pt of Dr Mcguire     Here for BP check. Was seen by Dr Solano 6/22/2019 - BP meds changed to amlodipine. Still taking losartan and hctz    +headaches- right sided near right eye   No vision changes.   No chest pain or SOB.   No swelling in the legs   BP fluctuates greatly       Past Medical History:  Past Medical History:   Diagnosis Date    Acquired hypothyroidism 7/30/2013    Arthritis     Blood transfusion     before 2005 - whe had gangrenous gall bladder    Cataract     Chronic back pain 12/4/2018    - Takes Percocet 5-325 at home - Can continue current abdominal pain control with Dilaudid 0.5 mg IV Q3H prn     CKD (chronic kidney disease) stage 3, GFR 30-59 ml/min     Compression fracture of lumbar vertebra     Depression     Dyslipidemia     Essential hypertension 7/30/2013    Gastroesophageal reflux disease without esophagitis 12/4/2018    - continue home Prilosec    General anesthetics causing adverse effect in therapeutic use     memory loss for six months after anesthesia    GERD (gastroesophageal reflux disease)     History of postoperative delirium 12/4/2018    - Patient reports 1 week history of post-op delirium 7/2018 - Monitor electrolytes, UA, and urine cx - Delirium precautions  - Can use Seroquel 25 mg QHS prn     Hypertension     Hyponatremia 10/23/2017    Impaired mobility and ADLs 6/28/2018    Neurogenic bladder 12/4/2018    Sleep disorder 12/4/2018    - continue Trazodone QHS    Thyroid disease     UTI (urinary tract infection)        Home Medications:  Prior to Admission medications   "  Medication Sig Start Date End Date Taking? Authorizing Provider   amLODIPine (NORVASC) 5 MG tablet Take 1 tablet (5 mg total) by mouth once daily. 6/22/19 6/21/20  Raegan Solano MD   aspirin (ECOTRIN) 81 MG EC tablet Take 1 tablet (81 mg total) by mouth once daily. 4/4/19   Obed Mcguire MD   atorvastatin (LIPITOR) 20 MG tablet Take 1 tablet (20 mg total) by mouth once daily. 4/4/19   Obed Mcguire MD   gabapentin (NEURONTIN) 400 MG capsule Take 2 capsules (800 mg total) by mouth 2 (two) times daily. 4/4/19   Obed Mcguire MD   hydroCHLOROthiazide (HYDRODIURIL) 25 MG tablet Take 1 tablet (25 mg total) by mouth once daily. 6/14/19   Obed Mcguire MD   hyoscyamine (LEVSIN/SL) 0.125 mg Subl Take 1 tablet (0.125 mg total) by mouth every 6 (six) hours as needed (bladder spasms). 4/4/19   Obed Mcguire MD   levothyroxine (SYNTHROID) 50 MCG tablet GIVE "PENNY" 1 TABLET BY MOUTH ONCE DAILY. 4/4/19   Obed Mcguire MD   losartan (COZAAR) 100 MG tablet Take 1 tablet (100 mg total) by mouth once daily. 6/13/19   Obed Mcguire MD   omeprazole (PRILOSEC) 40 MG capsule TAKE 1 CAPSULE(40 MG) BY MOUTH EVERY MORNING 30 MINUTES BEFORE EATING ON AN EMPTY STOMACH 4/4/19   Obed Mcguire MD   polyethylene glycol (GLYCOLAX) 17 gram/dose powder Take 17 g by mouth once daily. 4/4/19   Obed Mcguire MD   traZODone (DESYREL) 50 MG tablet Take 1 tablet (50 mg total) by mouth nightly. 4/4/19   Obed Mcguire MD       Review of Systems:  Review of Systems   Constitutional: Negative for chills, fatigue and fever.   Eyes: Negative for visual disturbance.   Respiratory: Negative for cough, shortness of breath and wheezing.    Cardiovascular: Negative for chest pain and palpitations.   Neurological: Positive for headaches. Negative for dizziness and light-headedness.       Health Maintainence:   Immunizations:  Health Maintenance       Date Due Completion Date    TETANUS VACCINE 09/18/1953 ---    Shingles Vaccine (1 of 2) " 09/18/1983 ---    Influenza Vaccine 08/01/2019 10/9/2018    Lipid Panel 04/04/2020 4/4/2019           PHYSICAL EXAM     There were no vitals taken for this visit.    Physical Exam   Constitutional: He is oriented to person, place, and time. He appears well-developed and well-nourished.   HENT:   Head: Normocephalic and atraumatic.   Eyes: Pupils are equal, round, and reactive to light.   Cardiovascular: Normal rate and regular rhythm.   Pulmonary/Chest: Effort normal.   Neurological: He is alert and oriented to person, place, and time.   Psychiatric: He has a normal mood and affect.   Vitals reviewed.      LABS     Lab Results   Component Value Date    HGBA1C 5.4 04/04/2019     CMP  Sodium   Date Value Ref Range Status   04/04/2019 133 (L) 136 - 145 mmol/L Final     Potassium   Date Value Ref Range Status   04/04/2019 3.6 3.5 - 5.1 mmol/L Final     Chloride   Date Value Ref Range Status   04/04/2019 96 95 - 110 mmol/L Final     CO2   Date Value Ref Range Status   04/04/2019 28 23 - 29 mmol/L Final     Glucose   Date Value Ref Range Status   04/04/2019 98 70 - 110 mg/dL Final     BUN, Bld   Date Value Ref Range Status   04/04/2019 15 8 - 23 mg/dL Final     Creatinine   Date Value Ref Range Status   04/04/2019 1.2 0.5 - 1.4 mg/dL Final     Calcium   Date Value Ref Range Status   04/04/2019 9.1 8.7 - 10.5 mg/dL Final     Total Protein   Date Value Ref Range Status   04/04/2019 7.2 6.0 - 8.4 g/dL Final     Albumin   Date Value Ref Range Status   04/04/2019 4.0 3.5 - 5.2 g/dL Final     Total Bilirubin   Date Value Ref Range Status   04/04/2019 0.6 0.1 - 1.0 mg/dL Final     Comment:     For infants and newborns, interpretation of results should be based  on gestational age, weight and in agreement with clinical  observations.  Premature Infant recommended reference ranges:  Up to 24 hours.............<8.0 mg/dL  Up to 48 hours............<12.0 mg/dL  3-5 days..................<15.0 mg/dL  6-29 days.................<15.0  mg/dL       Alkaline Phosphatase   Date Value Ref Range Status   04/04/2019 87 55 - 135 U/L Final     AST   Date Value Ref Range Status   04/04/2019 14 10 - 40 U/L Final     ALT   Date Value Ref Range Status   04/04/2019 6 (L) 10 - 44 U/L Final     Anion Gap   Date Value Ref Range Status   04/04/2019 9 8 - 16 mmol/L Final     eGFR if    Date Value Ref Range Status   04/04/2019 >60 >60 mL/min/1.73 m^2 Final     eGFR if non    Date Value Ref Range Status   04/04/2019 55 (A) >60 mL/min/1.73 m^2 Final     Comment:     Calculation used to obtain the estimated glomerular filtration  rate (eGFR) is the CKD-EPI equation.        Lab Results   Component Value Date    WBC 6.01 04/04/2019    HGB 12.5 (L) 04/04/2019    HCT 38.4 (L) 04/04/2019    MCV 84 04/04/2019     04/04/2019     Lab Results   Component Value Date    CHOL 200 (H) 04/04/2019    CHOL 222 (H) 10/09/2018    CHOL 176 11/08/2016     Lab Results   Component Value Date    HDL 38 (L) 04/04/2019    HDL 44 10/09/2018    HDL 30 (L) 11/08/2016     Lab Results   Component Value Date    LDLCALC 132.6 04/04/2019    LDLCALC 146.6 10/09/2018    LDLCALC 105.8 11/08/2016     Lab Results   Component Value Date    TRIG 147 04/04/2019    TRIG 157 (H) 10/09/2018    TRIG 201 (H) 11/08/2016     Lab Results   Component Value Date    CHOLHDL 19.0 (L) 04/04/2019    CHOLHDL 19.8 (L) 10/09/2018    CHOLHDL 17.0 (L) 11/08/2016     Lab Results   Component Value Date    TSH 0.788 04/04/2019       ASSESSMENT/PLAN     Chucho Prado is a 83 y.o. male with  Past Medical History:   Diagnosis Date    Acquired hypothyroidism 7/30/2013    Arthritis     Blood transfusion     before 2005 - whe had gangrenous gall bladder    Cataract     Chronic back pain 12/4/2018    - Takes Percocet 5-325 at home - Can continue current abdominal pain control with Dilaudid 0.5 mg IV Q3H prn     CKD (chronic kidney disease) stage 3, GFR 30-59 ml/min     Compression  fracture of lumbar vertebra     Depression     Dyslipidemia     Essential hypertension 7/30/2013    Gastroesophageal reflux disease without esophagitis 12/4/2018    - continue home Prilosec    General anesthetics causing adverse effect in therapeutic use     memory loss for six months after anesthesia    GERD (gastroesophageal reflux disease)     History of postoperative delirium 12/4/2018    - Patient reports 1 week history of post-op delirium 7/2018 - Monitor electrolytes, UA, and urine cx - Delirium precautions  - Can use Seroquel 25 mg QHS prn     Hypertension     Hyponatremia 10/23/2017    Impaired mobility and ADLs 6/28/2018    Neurogenic bladder 12/4/2018    Sleep disorder 12/4/2018    - continue Trazodone QHS    Thyroid disease     UTI (urinary tract infection)      Essential hypertension- near goal but fluctuates. Will cont losartan and amlodipine in am and start metoprolol in pm and cont hctz in pm. amlodipine 10 caused swelling in the past. Will rtc in 4 weeks for BP check   -     metoprolol succinate (TOPROL-XL) 25 MG 24 hr tablet; Take 1 tablet (25 mg total) by mouth once daily.  Dispense: 30 tablet; Refill: 11    Follow up with PCP in 4 weeks for BP check     Patient education provided from [facility]. Patient was counseled on when and how to seek emergent care.       Justa MOYA, TEODORO, FNP-c   Department of Internal Medicine - [facility]  3:02 PM   [phone]

## 2019-07-17 NOTE — PROVIDER CONTACT NOTE (CRITICAL VALUE NOTIFICATION) - NAME OF MD/NP/PA/DO NOTIFIED:
Dr Sterling
Dr. Sterling
T/S result which was drawn yesterday is still valid on the comuter until 19th of July.

## 2019-08-08 ENCOUNTER — APPOINTMENT (OUTPATIENT)
Dept: GASTROENTEROLOGY | Facility: CLINIC | Age: 69
End: 2019-08-08

## 2019-09-03 ENCOUNTER — OUTPATIENT (OUTPATIENT)
Dept: OUTPATIENT SERVICES | Facility: HOSPITAL | Age: 69
LOS: 1 days | End: 2019-09-03
Payer: MEDICARE

## 2019-09-03 VITALS
WEIGHT: 184.09 LBS | HEART RATE: 76 BPM | OXYGEN SATURATION: 96 % | DIASTOLIC BLOOD PRESSURE: 64 MMHG | SYSTOLIC BLOOD PRESSURE: 128 MMHG | TEMPERATURE: 97 F | RESPIRATION RATE: 14 BRPM | HEIGHT: 61 IN

## 2019-09-03 DIAGNOSIS — K80.20 CALCULUS OF GALLBLADDER WITHOUT CHOLECYSTITIS WITHOUT OBSTRUCTION: ICD-10-CM

## 2019-09-03 DIAGNOSIS — Z98.890 OTHER SPECIFIED POSTPROCEDURAL STATES: Chronic | ICD-10-CM

## 2019-09-03 DIAGNOSIS — H26.9 UNSPECIFIED CATARACT: Chronic | ICD-10-CM

## 2019-09-03 DIAGNOSIS — Z90.710 ACQUIRED ABSENCE OF BOTH CERVIX AND UTERUS: Chronic | ICD-10-CM

## 2019-09-03 LAB
ALBUMIN SERPL ELPH-MCNC: 3.6 G/DL — SIGNIFICANT CHANGE UP (ref 3.3–5)
ALP SERPL-CCNC: 159 U/L — HIGH (ref 40–120)
ALT FLD-CCNC: 10 U/L — SIGNIFICANT CHANGE UP (ref 4–33)
ANION GAP SERPL CALC-SCNC: 15 MMO/L — HIGH (ref 7–14)
AST SERPL-CCNC: 12 U/L — SIGNIFICANT CHANGE UP (ref 4–32)
BILIRUB SERPL-MCNC: 0.5 MG/DL — SIGNIFICANT CHANGE UP (ref 0.2–1.2)
BUN SERPL-MCNC: 43 MG/DL — HIGH (ref 7–23)
CALCIUM SERPL-MCNC: 9.2 MG/DL — SIGNIFICANT CHANGE UP (ref 8.4–10.5)
CHLORIDE SERPL-SCNC: 91 MMOL/L — LOW (ref 98–107)
CO2 SERPL-SCNC: 29 MMOL/L — SIGNIFICANT CHANGE UP (ref 22–31)
CREAT SERPL-MCNC: 4.54 MG/DL — HIGH (ref 0.5–1.3)
GLUCOSE SERPL-MCNC: 332 MG/DL — HIGH (ref 70–99)
HBA1C BLD-MCNC: 10.5 % — HIGH (ref 4–5.6)
HCT VFR BLD CALC: 43.1 % — SIGNIFICANT CHANGE UP (ref 34.5–45)
HGB BLD-MCNC: 12.5 G/DL — SIGNIFICANT CHANGE UP (ref 11.5–15.5)
MCHC RBC-ENTMCNC: 26.8 PG — LOW (ref 27–34)
MCHC RBC-ENTMCNC: 29 % — LOW (ref 32–36)
MCV RBC AUTO: 92.3 FL — SIGNIFICANT CHANGE UP (ref 80–100)
NRBC # FLD: 0 K/UL — SIGNIFICANT CHANGE UP (ref 0–0)
PLATELET # BLD AUTO: 301 K/UL — SIGNIFICANT CHANGE UP (ref 150–400)
PMV BLD: 11 FL — SIGNIFICANT CHANGE UP (ref 7–13)
POTASSIUM SERPL-MCNC: 5 MMOL/L — SIGNIFICANT CHANGE UP (ref 3.5–5.3)
POTASSIUM SERPL-SCNC: 5 MMOL/L — SIGNIFICANT CHANGE UP (ref 3.5–5.3)
PROT SERPL-MCNC: 8 G/DL — SIGNIFICANT CHANGE UP (ref 6–8.3)
RBC # BLD: 4.67 M/UL — SIGNIFICANT CHANGE UP (ref 3.8–5.2)
RBC # FLD: 15.7 % — HIGH (ref 10.3–14.5)
SODIUM SERPL-SCNC: 135 MMOL/L — SIGNIFICANT CHANGE UP (ref 135–145)
WBC # BLD: 9.3 K/UL — SIGNIFICANT CHANGE UP (ref 3.8–10.5)
WBC # FLD AUTO: 9.3 K/UL — SIGNIFICANT CHANGE UP (ref 3.8–10.5)

## 2019-09-03 PROCEDURE — 93010 ELECTROCARDIOGRAM REPORT: CPT

## 2019-09-03 RX ORDER — SODIUM CHLORIDE 9 MG/ML
1000 INJECTION INTRAMUSCULAR; INTRAVENOUS; SUBCUTANEOUS
Refills: 0 | Status: DISCONTINUED | OUTPATIENT
Start: 2019-09-10 | End: 2019-09-18

## 2019-09-03 NOTE — H&P PST ADULT - REASON FOR ADMISSION
"She's having ERCP to remove biliary stent. She's had it for 6 months.  They're going to remove the stent first and have surgery to remove her gallbladder." "removing gallbladder"

## 2019-09-03 NOTE — H&P PST ADULT - NSICDXPASTSURGICALHX_GEN_ALL_CORE_FT
PAST SURGICAL HISTORY:  H/O: hysterectomy 1990    History of biliary stent insertion 11/2018    S/P arteriovenous (AV) fistula creation 05/17/2019 PAST SURGICAL HISTORY:  Cataract IOL b/l    H/O: hysterectomy 1990    History of biliary stent insertion 11/2018 & removal 7/2/19    S/P arteriovenous (AV) fistula creation 05/17/2019

## 2019-09-03 NOTE — H&P PST ADULT - NSICDXPASTMEDICALHX_GEN_ALL_CORE_FT
PAST MEDICAL HISTORY:  Acute urinary retention     CAD (coronary artery disease) s/p stent x 1 in 2007    Cholecystitis with cholangitis     Chronic pancreatitis     Diabetes Type 2 DM    ESRD (end stage renal disease) on dialysis MWF since 05/2018    H/O diabetic neuropathy     Hypertension     Liver abscess resolved    Myocardial infarction 2007    PAF (paroxysmal atrial fibrillation) 1 episode in 11/2018 while hospitalized for acute cholecystitis

## 2019-09-03 NOTE — H&P PST ADULT - LYMPHATIC
posterior cervical R/anterior cervical L/anterior cervical R/posterior cervical L/supraclavicular L/supraclavicular R

## 2019-09-03 NOTE — H&P PST ADULT - NSICDXPROBLEM_GEN_ALL_CORE_FT
PROBLEM DIAGNOSES  Problem: Stented coronary artery  Assessment and Plan: continue aspirin for coronary stent protection    Problem: ESRD (end stage renal disease) on dialysis  Assessment and Plan: pt to have HD the day prior to ERCP stent removal procedure  check serum potassium DOS    Problem: Calculus of gallbladder without cholangitis or cholecystitis  Assessment and Plan: ERCP stent removal      R/O PROBLEM DIAGNOSES  Problem: Type 2 diabetes mellitus  Assessment and Plan: pt will check with PCP with insulin plan.  PST recommended to take 5 units basaglar insulin the evening prior to ERCP and take 4 units morning of procedure  check finger stick DOS PROBLEM DIAGNOSES  calculus of gallbladder:  scheduled for laparoscopic cholecystectomy on 9/10/19  preop instructions, surgical soap given  pt & daughter in-law verbalized good understanding, with teach back on surgical soap.  pending copy of medical eval.    Problem: Stented coronary artery  Assessment and Plan: continue aspirin for coronary stent protection  pending most recent cardiology studies     Problem: ESRD (end stage renal disease) on dialysis  Assessment and Plan: check serum potassium DOS    Problem: Calculus of gallbladder without cholangitis or cholecystitis  Assessment and Plan: ERCP stent removal    R/O PROBLEM DIAGNOSES  Problem: Type 2 diabetes mellitus  Assessment and Plan: Instructed to take 6 units basaglar insulin the evening & 3 units on morning of surgery   check finger stick DOS

## 2019-09-03 NOTE — H&P PST ADULT - ACTIVITY
pt activity level limited by lethargy and neuropathy of feet.  pt is able to walks around in the house, light dusting, walk up 1 flight of stairs adl's

## 2019-09-03 NOTE — H&P PST ADULT - HISTORY OF PRESENT ILLNESS
68 yo female. PMH T2DM (last A1C=8.5), diabetic neuropathy, CAD, MI (s/p coronary stent x1 2007), ESRD (HD TIW MWF). Nov 2018- pt was diagnosed with acute cholecystitis and liver abscess causing sepsis, s/p biliary stent placement, IV antibiotics treatment.  The liver and sepsis had since resolved, plan= remove biliary stent, then surgical removal of gallbladder.  In May 2019, pt was scheduled for biliary stent removal via ERCP at [facility], however procedure was cancelled due hyperkalemia, K=6.5.  Hyperkalemia was treated, now pt is rescheduled for biliary stent removal on 7/2.  **PSH: 5/17/2019- LUE AV fistula placement, pt is a somewhat poor historian, she stated she's having a phase 2 procudure of the placement of LUE AV fistula on 7/1.** 70 yo Roberto speaking female. PMH T2DM diabetic neuropathy, CAD, MI (s/p coronary stent x1 2007), ESRD (HD TIW MWF). Nov 2018- pt was diagnosed with acute cholecystitis and liver abscess causing sepsis, s/p biliary stent placement, IV antibiotics treatment.  The liver and sepsis had since resolved, plan= remove biliary stent, then surgical removal of gallbladder,  biliary stent removal on 7/2/19.  Now scheduled for laparoscopic cholecystectomy on 9/10/2019.

## 2019-09-09 ENCOUNTER — TRANSCRIPTION ENCOUNTER (OUTPATIENT)
Age: 69
End: 2019-09-09

## 2019-09-09 NOTE — ASU PATIENT PROFILE, ADULT - PSH
Cataract  IOL b/l  H/O: hysterectomy  1990  History of biliary stent insertion  11/2018 & removal 7/2/19  S/P arteriovenous (AV) fistula creation  05/17/2019

## 2019-09-09 NOTE — ASU PATIENT PROFILE, ADULT - PMH
Acute urinary retention    CAD (coronary artery disease)  s/p stent x 1 in 2007  Cholecystitis with cholangitis    Chronic pancreatitis    Diabetes  Type 2 DM  ESRD (end stage renal disease) on dialysis  MWF since 05/2018  H/O diabetic neuropathy    Hypertension    Liver abscess  resolved  Myocardial infarction  2007  PAF (paroxysmal atrial fibrillation)  1 episode in 11/2018 while hospitalized for acute cholecystitis

## 2019-09-09 NOTE — ASU PATIENT PROFILE, ADULT - INTERPRETATION SERVICES DECLINED
Patient/Caregiver requests family/friend to interpret. Patient/Caregiver requests family/friend to interpret./[id number]

## 2019-09-10 ENCOUNTER — OUTPATIENT (OUTPATIENT)
Dept: OUTPATIENT SERVICES | Facility: HOSPITAL | Age: 69
LOS: 1 days | Discharge: HOME | End: 2019-09-10
Payer: MEDICARE

## 2019-09-10 ENCOUNTER — RESULT REVIEW (OUTPATIENT)
Age: 69
End: 2019-09-10

## 2019-09-10 ENCOUNTER — APPOINTMENT (OUTPATIENT)
Dept: SURGERY | Facility: HOSPITAL | Age: 69
End: 2019-09-10

## 2019-09-10 VITALS
DIASTOLIC BLOOD PRESSURE: 53 MMHG | WEIGHT: 184.09 LBS | TEMPERATURE: 99 F | RESPIRATION RATE: 18 BRPM | HEIGHT: 61 IN | HEART RATE: 70 BPM | SYSTOLIC BLOOD PRESSURE: 142 MMHG | OXYGEN SATURATION: 98 %

## 2019-09-10 VITALS
SYSTOLIC BLOOD PRESSURE: 158 MMHG | DIASTOLIC BLOOD PRESSURE: 57 MMHG | OXYGEN SATURATION: 98 % | RESPIRATION RATE: 16 BRPM | HEART RATE: 75 BPM

## 2019-09-10 DIAGNOSIS — H26.9 UNSPECIFIED CATARACT: Chronic | ICD-10-CM

## 2019-09-10 DIAGNOSIS — Z98.890 OTHER SPECIFIED POSTPROCEDURAL STATES: Chronic | ICD-10-CM

## 2019-09-10 DIAGNOSIS — K80.20 CALCULUS OF GALLBLADDER WITHOUT CHOLECYSTITIS WITHOUT OBSTRUCTION: ICD-10-CM

## 2019-09-10 DIAGNOSIS — Z90.710 ACQUIRED ABSENCE OF BOTH CERVIX AND UTERUS: Chronic | ICD-10-CM

## 2019-09-10 LAB
GAS PNL BLDV: 137 MMOL/L — SIGNIFICANT CHANGE UP (ref 136–146)
GLUCOSE BLDV-MCNC: 114 MG/DL — HIGH (ref 70–99)
HCT VFR BLDV CALC: 38.9 % — SIGNIFICANT CHANGE UP (ref 34.5–45)
POTASSIUM BLDV-SCNC: 4.3 MMOL/L — SIGNIFICANT CHANGE UP (ref 3.4–4.5)

## 2019-09-10 PROCEDURE — 93010 ELECTROCARDIOGRAM REPORT: CPT

## 2019-09-10 PROCEDURE — 88304 TISSUE EXAM BY PATHOLOGIST: CPT | Mod: 26

## 2019-09-10 PROCEDURE — 47562 LAPAROSCOPIC CHOLECYSTECTOMY: CPT | Mod: AS

## 2019-09-10 PROCEDURE — 47562 LAPAROSCOPIC CHOLECYSTECTOMY: CPT

## 2019-09-10 RX ORDER — FENTANYL CITRATE 50 UG/ML
50 INJECTION INTRAVENOUS ONCE
Refills: 0 | Status: DISCONTINUED | OUTPATIENT
Start: 2019-09-10 | End: 2019-09-10

## 2019-09-10 RX ORDER — FENTANYL CITRATE 50 UG/ML
25 INJECTION INTRAVENOUS
Refills: 0 | Status: DISCONTINUED | OUTPATIENT
Start: 2019-09-10 | End: 2019-09-10

## 2019-09-10 RX ORDER — ONDANSETRON 8 MG/1
4 TABLET, FILM COATED ORAL ONCE
Refills: 0 | Status: COMPLETED | OUTPATIENT
Start: 2019-09-10 | End: 2019-09-10

## 2019-09-10 RX ADMIN — ONDANSETRON 4 MILLIGRAM(S): 8 TABLET, FILM COATED ORAL at 11:02

## 2019-09-10 NOTE — ASU DISCHARGE PLAN (ADULT/PEDIATRIC) - CALL YOUR DOCTOR IF YOU HAVE ANY OF THE FOLLOWING:
Swelling that gets worse/Bleeding that does not stop/Unable to urinate/Nausea and vomiting that does not stop/Fever greater than (need to indicate Fahrenheit or Celsius)/Wound/Surgical Site with redness, or foul smelling discharge or pus

## 2019-09-10 NOTE — ASU PREOP CHECKLIST - 2.
HD         falls precaution    do not use  arm HD   last on monday          falls precaution    do not use ;left   arm    right neck shiley

## 2019-09-10 NOTE — ASU PREOP CHECKLIST - INTERNAL PROSTHESES
yes(specify)/cardiac stent     fistula cardiac stent     fistula left   right chest wall vanessa/yes(specify)

## 2019-09-13 PROBLEM — I25.10 ATHEROSCLEROTIC HEART DISEASE OF NATIVE CORONARY ARTERY WITHOUT ANGINA PECTORIS: Chronic | Status: ACTIVE | Noted: 2018-05-03

## 2019-09-14 NOTE — PHYSICAL THERAPY INITIAL EVALUATION ADULT - SIT-TO-STAND BALANCE
Patient states her legs have swelling during the day  but it goes down at  night. Cough x 1-2 weeks, left arm feels weak, " every thing feels weak" . I don't feel like my legs will hold me. No chest pain , no sob. Symptoms started 2 pm today.  Patient table to ambulate and move all extremities without difficulty. fair balance

## 2019-09-16 ENCOUNTER — APPOINTMENT (OUTPATIENT)
Dept: SURGERY | Facility: CLINIC | Age: 69
End: 2019-09-16
Payer: MEDICARE

## 2019-09-16 VITALS
HEART RATE: 78 BPM | HEIGHT: 65 IN | SYSTOLIC BLOOD PRESSURE: 186 MMHG | WEIGHT: 176 LBS | BODY MASS INDEX: 29.32 KG/M2 | DIASTOLIC BLOOD PRESSURE: 84 MMHG | TEMPERATURE: 97.8 F

## 2019-09-16 PROCEDURE — 99024 POSTOP FOLLOW-UP VISIT: CPT

## 2019-09-16 NOTE — ASSESSMENT
[FreeTextEntry1] : Patient is doing well, has some RUQ pain, denies taking anything for pain post op.\par denies fevers, + BM's and tolerating a diet\par \par \par \par incisions are c/d/i and healing well.\par min RUQ tenderness.\par \par \par f/u 2 weeks\par tylenol for pain

## 2019-09-18 LAB — SURGICAL PATHOLOGY STUDY: SIGNIFICANT CHANGE UP

## 2019-09-23 ENCOUNTER — APPOINTMENT (OUTPATIENT)
Dept: SURGERY | Facility: CLINIC | Age: 69
End: 2019-09-23

## 2019-09-28 NOTE — PATIENT PROFILE ADULT - AD AGENT PHONE NUMBER
patient denies trauma  pain control, no DVT on US    CT of leg C+ - diffuse edema w/o clot or bleed. Uterine mass?  vascular consult appreciated   - CT abd/pelvis: right adnexal mass, right ovarian dermoid.  - Oncology eval called  - GYN eval called [phone]

## 2019-09-30 ENCOUNTER — APPOINTMENT (OUTPATIENT)
Dept: SURGERY | Facility: CLINIC | Age: 69
End: 2019-09-30
Payer: MEDICARE

## 2019-09-30 VITALS
WEIGHT: 176 LBS | HEIGHT: 65 IN | SYSTOLIC BLOOD PRESSURE: 175 MMHG | HEART RATE: 73 BPM | DIASTOLIC BLOOD PRESSURE: 71 MMHG | BODY MASS INDEX: 29.32 KG/M2

## 2019-09-30 VITALS — TEMPERATURE: 98.8 F

## 2019-09-30 DIAGNOSIS — Z09 ENCOUNTER FOR FOLLOW-UP EXAMINATION AFTER COMPLETED TREATMENT FOR CONDITIONS OTHER THAN MALIGNANT NEOPLASM: ICD-10-CM

## 2019-09-30 PROCEDURE — 99024 POSTOP FOLLOW-UP VISIT: CPT

## 2019-09-30 NOTE — PLAN
[FreeTextEntry1] : Patient is well, states some hardness over midline incision.\par \par \par incisions are c/d/i healing well. + healing ridge appreciated.\par \par \par \par f/u prn

## 2020-02-05 NOTE — PATIENT PROFILE ADULT - LAST BOWEL MOVEMENT DATE
Deyaniraheatherelder is in today with pb Ogden to get her G-tube changed as she is not sure when the tube was changed last.  Writer verified that there was a 12FR, 1.7cm AMT button in place, writer deflated the balloon with a slip tip syringe and there was 1.5ml of fluid in the balloon, balloon and tubing was intact when removed.  AMT button removed out without issue, new tube placed and 2.5ml of water inflated into the balloon.  Extension tubing attached and gastric contents aspirated to verify placement, re-fed gastric contents after.  During the G-tube change in the office today, writer did teaching with pb Ogden regarding changing the tube, informed her it should be changed every 3 months, and she should call the supply company and request another kit to have on hand at home in case this one malfunctions or the tube comes out.  Discussed when and how to use the red rubber catheter if the G-tube comes out and a new one can not be placed.  Discussed medication administration with the syringes and extension tubings provided with the G-tube kit.  Melvi states that she has been pouring the liquid medications into medication cups and then drawing it up with a syringe, and then pouring the unused medication back into the bottle.  Informed Melvi that the pharmacy should have plastic stoppers that go into the bottle so she can insert an empty syringe into the stopper and only draw up what she needs, that way she is not wasting medication and doesn't have to pour the unused medication from the cup back into the bottle.    Melvi states that home health has been in contact with her regarding home care, IVA Curtis saw Tejas yesterday and per her note:  \"danette not changed tonight.  Writer to referr to  to change out danette sometime this week.\"     Dr. Jean requests that the home health nurse help arrange a nebulizer for home use as she does not currently have one.  Dr. Jean would also like  weights completed in the home twice a month.  The RN will also need to help teach biological parents how to correctly administer feedings via the pump as well as bolus.    29-Oct-2018

## 2020-02-07 ENCOUNTER — INPATIENT (INPATIENT)
Facility: HOSPITAL | Age: 70
LOS: 5 days | Discharge: NURSING, REHAB OR LONG-TERM CARE | DRG: 562 | End: 2020-02-13
Attending: FAMILY MEDICINE | Admitting: FAMILY MEDICINE
Payer: MEDICARE

## 2020-02-07 VITALS
OXYGEN SATURATION: 97 % | DIASTOLIC BLOOD PRESSURE: 57 MMHG | TEMPERATURE: 98 F | HEIGHT: 61 IN | RESPIRATION RATE: 16 BRPM | HEART RATE: 72 BPM | SYSTOLIC BLOOD PRESSURE: 130 MMHG | WEIGHT: 169.98 LBS

## 2020-02-07 DIAGNOSIS — H26.9 UNSPECIFIED CATARACT: Chronic | ICD-10-CM

## 2020-02-07 DIAGNOSIS — Z90.710 ACQUIRED ABSENCE OF BOTH CERVIX AND UTERUS: Chronic | ICD-10-CM

## 2020-02-07 DIAGNOSIS — Z98.890 OTHER SPECIFIED POSTPROCEDURAL STATES: Chronic | ICD-10-CM

## 2020-02-07 DIAGNOSIS — S82.841A DISPLACED BIMALLEOLAR FRACTURE OF RIGHT LOWER LEG, INITIAL ENCOUNTER FOR CLOSED FRACTURE: ICD-10-CM

## 2020-02-07 LAB
ALBUMIN SERPL ELPH-MCNC: 2.9 G/DL — LOW (ref 3.3–5)
ALP SERPL-CCNC: 195 U/L — HIGH (ref 40–120)
ALT FLD-CCNC: 16 U/L — SIGNIFICANT CHANGE UP (ref 12–78)
ANION GAP SERPL CALC-SCNC: 6 MMOL/L — SIGNIFICANT CHANGE UP (ref 5–17)
APTT BLD: 30.8 SEC — SIGNIFICANT CHANGE UP (ref 28.5–37)
AST SERPL-CCNC: 16 U/L — SIGNIFICANT CHANGE UP (ref 15–37)
BASOPHILS # BLD AUTO: 0.03 K/UL — SIGNIFICANT CHANGE UP (ref 0–0.2)
BASOPHILS NFR BLD AUTO: 0.3 % — SIGNIFICANT CHANGE UP (ref 0–2)
BILIRUB SERPL-MCNC: 0.4 MG/DL — SIGNIFICANT CHANGE UP (ref 0.2–1.2)
BLD GP AB SCN SERPL QL: SIGNIFICANT CHANGE UP
BUN SERPL-MCNC: 17 MG/DL — SIGNIFICANT CHANGE UP (ref 7–23)
CALCIUM SERPL-MCNC: 8.7 MG/DL — SIGNIFICANT CHANGE UP (ref 8.5–10.1)
CHLORIDE SERPL-SCNC: 96 MMOL/L — SIGNIFICANT CHANGE UP (ref 96–108)
CO2 SERPL-SCNC: 33 MMOL/L — HIGH (ref 22–31)
CREAT SERPL-MCNC: 2.9 MG/DL — HIGH (ref 0.5–1.3)
EOSINOPHIL # BLD AUTO: 0.31 K/UL — SIGNIFICANT CHANGE UP (ref 0–0.5)
EOSINOPHIL NFR BLD AUTO: 2.7 % — SIGNIFICANT CHANGE UP (ref 0–6)
GLUCOSE SERPL-MCNC: 279 MG/DL — HIGH (ref 70–99)
HBA1C BLD-MCNC: 9.4 % — HIGH (ref 4–5.6)
HCT VFR BLD CALC: 38.3 % — SIGNIFICANT CHANGE UP (ref 34.5–45)
HGB BLD-MCNC: 11.9 G/DL — SIGNIFICANT CHANGE UP (ref 11.5–15.5)
IMM GRANULOCYTES NFR BLD AUTO: 0.4 % — SIGNIFICANT CHANGE UP (ref 0–1.5)
INR BLD: 1.22 RATIO — HIGH (ref 0.88–1.16)
LYMPHOCYTES # BLD AUTO: 26.5 % — SIGNIFICANT CHANGE UP (ref 13–44)
LYMPHOCYTES # BLD AUTO: 3 K/UL — SIGNIFICANT CHANGE UP (ref 1–3.3)
MCHC RBC-ENTMCNC: 29.1 PG — SIGNIFICANT CHANGE UP (ref 27–34)
MCHC RBC-ENTMCNC: 31.1 GM/DL — LOW (ref 32–36)
MCV RBC AUTO: 93.6 FL — SIGNIFICANT CHANGE UP (ref 80–100)
MONOCYTES # BLD AUTO: 0.54 K/UL — SIGNIFICANT CHANGE UP (ref 0–0.9)
MONOCYTES NFR BLD AUTO: 4.8 % — SIGNIFICANT CHANGE UP (ref 2–14)
NEUTROPHILS # BLD AUTO: 7.41 K/UL — HIGH (ref 1.8–7.4)
NEUTROPHILS NFR BLD AUTO: 65.3 % — SIGNIFICANT CHANGE UP (ref 43–77)
NRBC # BLD: 0 /100 WBCS — SIGNIFICANT CHANGE UP (ref 0–0)
PLATELET # BLD AUTO: 278 K/UL — SIGNIFICANT CHANGE UP (ref 150–400)
POTASSIUM SERPL-MCNC: 3.8 MMOL/L — SIGNIFICANT CHANGE UP (ref 3.5–5.3)
POTASSIUM SERPL-SCNC: 3.8 MMOL/L — SIGNIFICANT CHANGE UP (ref 3.5–5.3)
PROT SERPL-MCNC: 8.1 G/DL — SIGNIFICANT CHANGE UP (ref 6–8.3)
PROTHROM AB SERPL-ACNC: 13.8 SEC — HIGH (ref 10–12.9)
RBC # BLD: 4.09 M/UL — SIGNIFICANT CHANGE UP (ref 3.8–5.2)
RBC # FLD: 14.9 % — HIGH (ref 10.3–14.5)
SODIUM SERPL-SCNC: 135 MMOL/L — SIGNIFICANT CHANGE UP (ref 135–145)
WBC # BLD: 11.34 K/UL — HIGH (ref 3.8–10.5)
WBC # FLD AUTO: 11.34 K/UL — HIGH (ref 3.8–10.5)

## 2020-02-07 PROCEDURE — 93010 ELECTROCARDIOGRAM REPORT: CPT

## 2020-02-07 PROCEDURE — 73590 X-RAY EXAM OF LOWER LEG: CPT | Mod: 26,RT

## 2020-02-07 PROCEDURE — 73630 X-RAY EXAM OF FOOT: CPT | Mod: 26,RT

## 2020-02-07 PROCEDURE — 99222 1ST HOSP IP/OBS MODERATE 55: CPT

## 2020-02-07 PROCEDURE — 73610 X-RAY EXAM OF ANKLE: CPT | Mod: 26,RT

## 2020-02-07 PROCEDURE — 99283 EMERGENCY DEPT VISIT LOW MDM: CPT

## 2020-02-07 RX ORDER — ISOSORBIDE MONONITRATE 60 MG/1
30 TABLET, EXTENDED RELEASE ORAL AT BEDTIME
Refills: 0 | Status: DISCONTINUED | OUTPATIENT
Start: 2020-02-08 | End: 2020-02-08

## 2020-02-07 RX ORDER — ASPIRIN/CALCIUM CARB/MAGNESIUM 324 MG
81 TABLET ORAL DAILY
Refills: 0 | Status: DISCONTINUED | OUTPATIENT
Start: 2020-02-07 | End: 2020-02-13

## 2020-02-07 RX ORDER — DEXTROSE 50 % IN WATER 50 %
15 SYRINGE (ML) INTRAVENOUS ONCE
Refills: 0 | Status: DISCONTINUED | OUTPATIENT
Start: 2020-02-07 | End: 2020-02-13

## 2020-02-07 RX ORDER — ATORVASTATIN CALCIUM 80 MG/1
80 TABLET, FILM COATED ORAL AT BEDTIME
Refills: 0 | Status: DISCONTINUED | OUTPATIENT
Start: 2020-02-07 | End: 2020-02-13

## 2020-02-07 RX ORDER — SODIUM CHLORIDE 9 MG/ML
1000 INJECTION, SOLUTION INTRAVENOUS
Refills: 0 | Status: DISCONTINUED | OUTPATIENT
Start: 2020-02-07 | End: 2020-02-13

## 2020-02-07 RX ORDER — INSULIN LISPRO 100/ML
VIAL (ML) SUBCUTANEOUS AT BEDTIME
Refills: 0 | Status: DISCONTINUED | OUTPATIENT
Start: 2020-02-07 | End: 2020-02-08

## 2020-02-07 RX ORDER — DEXTROSE 50 % IN WATER 50 %
25 SYRINGE (ML) INTRAVENOUS ONCE
Refills: 0 | Status: DISCONTINUED | OUTPATIENT
Start: 2020-02-07 | End: 2020-02-13

## 2020-02-07 RX ORDER — NIFEDIPINE 30 MG
30 TABLET, EXTENDED RELEASE 24 HR ORAL DAILY
Refills: 0 | Status: DISCONTINUED | OUTPATIENT
Start: 2020-02-07 | End: 2020-02-08

## 2020-02-07 RX ORDER — GLUCAGON INJECTION, SOLUTION 0.5 MG/.1ML
1 INJECTION, SOLUTION SUBCUTANEOUS ONCE
Refills: 0 | Status: DISCONTINUED | OUTPATIENT
Start: 2020-02-07 | End: 2020-02-13

## 2020-02-07 RX ORDER — DEXTROSE 50 % IN WATER 50 %
12.5 SYRINGE (ML) INTRAVENOUS ONCE
Refills: 0 | Status: DISCONTINUED | OUTPATIENT
Start: 2020-02-07 | End: 2020-02-13

## 2020-02-07 RX ORDER — INSULIN LISPRO 100/ML
VIAL (ML) SUBCUTANEOUS
Refills: 0 | Status: DISCONTINUED | OUTPATIENT
Start: 2020-02-07 | End: 2020-02-08

## 2020-02-07 RX ORDER — INSULIN GLARGINE 100 [IU]/ML
8 INJECTION, SOLUTION SUBCUTANEOUS AT BEDTIME
Refills: 0 | Status: DISCONTINUED | OUTPATIENT
Start: 2020-02-07 | End: 2020-02-08

## 2020-02-07 RX ORDER — ISOSORBIDE MONONITRATE 60 MG/1
30 TABLET, EXTENDED RELEASE ORAL DAILY
Refills: 0 | Status: DISCONTINUED | OUTPATIENT
Start: 2020-02-07 | End: 2020-02-07

## 2020-02-07 RX ORDER — GABAPENTIN 400 MG/1
300 CAPSULE ORAL
Refills: 0 | Status: DISCONTINUED | OUTPATIENT
Start: 2020-02-07 | End: 2020-02-13

## 2020-02-07 RX ORDER — METOPROLOL TARTRATE 50 MG
50 TABLET ORAL
Refills: 0 | Status: DISCONTINUED | OUTPATIENT
Start: 2020-02-07 | End: 2020-02-08

## 2020-02-07 RX ADMIN — ISOSORBIDE MONONITRATE 30 MILLIGRAM(S): 60 TABLET, EXTENDED RELEASE ORAL at 19:53

## 2020-02-07 RX ADMIN — ATORVASTATIN CALCIUM 80 MILLIGRAM(S): 80 TABLET, FILM COATED ORAL at 22:17

## 2020-02-07 RX ADMIN — GABAPENTIN 300 MILLIGRAM(S): 400 CAPSULE ORAL at 17:30

## 2020-02-07 RX ADMIN — Medication 3: at 22:04

## 2020-02-07 RX ADMIN — Medication 50 MILLIGRAM(S): at 17:30

## 2020-02-07 RX ADMIN — INSULIN GLARGINE 8 UNIT(S): 100 INJECTION, SOLUTION SUBCUTANEOUS at 22:04

## 2020-02-07 NOTE — CONSULT NOTE ADULT - ASSESSMENT
71 yo F with PMH of ESRD on HD (M, W, F), CAD s/p stents 2007, DM, HTN, MI, biliary stent, Liver abscess who presents to the ED with a history of fall and found to have fracture of the right ankle.   Continue the pan medications as ordered. will schedule for dialysis on monday. spoke to patient's dtr at bed side.

## 2020-02-07 NOTE — H&P ADULT - HISTORY OF PRESENT ILLNESS
· HPI Objective Statement: 70 to F p/w twisted R ankle today, co pain to R ankle. Pt was initially able to walk on it, now with inc pain and swelling after going out to her dialysis (had full dialysis). Pt now unable to bear wt on r ankle. no numb/ting/focal weak. No head inj. No neck / back pain. no numb/ting/focal weak. no agg/allev factors. No other co.	  · Negative Findings: no numbness, no tingling, no weakness	  In ER patient was found to have ankle fracture.  Patient is being admitted for further work up and treatment.

## 2020-02-07 NOTE — CONSULT NOTE ADULT - ASSESSMENT
71 yo F PMH HTN, DM, MI s/p stent x1 in 2007, ESRD on HD (MWF), diabetic neuropathy being admitted with right ankle fracture with plan for surgery.     Pre op optimization   - Patient is class III risk according to RCRI. She has a poor exercise tolerance, cannot perform >/ 4 METS, although it is not clear if that is due to cardiac pathology or neuropathy. However, she had recent cholecystectomy and did well. She is considered optimized from cardiovascular standpoint to proceed with planned procedure     CAD  - continue atorvastatin 80 mg daily  - continue aspirin 81 mg  - continue metoprolol tartrage 50 mg BID    - continuous dialysis  - No acute changes on EKG compared to previous  - Previous ECHO in 5/18 showed EF 50-55 percent with no significant valvular abnormalities.  - monitor and replete lytes, keep K>4, Mg>2  - Other cardiovascular workup will depend on clinical course.  - All other workup per primary team  - Will follow 69 yo F PMH HTN, DM, MI s/p stent x1 in 2007, ESRD on HD (MWF), diabetic neuropathy being admitted with right ankle fracture with plan for surgery.     Pre op optimization   - Patient is class III risk according to RCRI. She has a poor exercise tolerance, cannot perform >/ 4 METS, although it is not clear if that is due to cardiac pathology or neuropathy. She has no active MI, CAD, HF exacerbation. She had recent cholecystectomy and did well. She is considered optimized from cardiovascular standpoint to proceed with planned procedure     CAD  - continue atorvastatin 80 mg daily  - continue aspirin 81 mg  - continue metoprolol tartrate 50 mg BID    - continuous dialysis  - No acute changes on EKG compared to previous  - Previous ECHO in 5/18 showed EF 50-55 percent with no significant valvular abnormalities.  - monitor and replete lytes, keep K>4, Mg>2  - Other cardiovascular workup will depend on clinical course.  - All other workup per primary team  - Will follow

## 2020-02-07 NOTE — PATIENT PROFILE ADULT - HARM RISK FACTORS
need for outpatient follow-up/radiology results/return to ED if symptoms worsen, persist or questions arise
yes

## 2020-02-07 NOTE — CONSULT NOTE ADULT - SUBJECTIVE AND OBJECTIVE BOX
[facility]         Glynn Bullock, Susan, Claudia, Flaquita, Terry Mora        [phone] (office)    CHIEF COMPLAINT: Patient is a 70y old  Female who presents with a chief complaint of fall (07 Feb 2020 16:33)      HPI: 69 yo F PMH HTN, DM, MI s/p stent x1 in 2007, ESRD on HD (MWF), diabetic neuropathy in the ED after falling and twisting her ankle. Patient April speaking, daughter at bedside acted as . Daughter states patient tripped and was unable to bear weight on her ankle. She follows with Dr. Monk and Dr. Woodson, cardiologists. Had an echo and stress test at that time, told everything was OK. Denies chest pain, palpitations, SOB, swelling in LE (not related to recent ankle injury). Patient had lap kevin in September, no cardiac complications.  Daughter states her mother cannot walk more than a few feet due to the neuropathy in her feet. She cannot climb stairs or do any housework that requires her to be on her feet. She does not feel SOB when she does walk.      PAST MEDICAL & SURGICAL HISTORY:  H/O diabetic neuropathy  PAF (paroxysmal atrial fibrillation): 1 episode in 11/2018 while hospitalized for acute cholecystitis  Chronic pancreatitis  Cholecystitis with cholangitis  Acute urinary retention  Liver abscess: resolved  ESRD (end stage renal disease) on dialysis: [facility] since 05/2018  CAD (coronary artery disease): s/p stent x 1 in 2007  Diabetes: Type 2 DM  Myocardial infarction: 2007  Hypertension  Cataract: IOL b/l  S/P arteriovenous (AV) fistula creation: 05/17/2019  History of biliary stent insertion: 11/2018 &amp; removal 7/2/19  H/O: hysterectomy: 1990      SOCIAL HISTORY: No active tobacco, alcohol or illicit drug use    FAMILY HISTORY:  No history of early MI, CAD or CVA or of sudden death in first degree relatives    Outpatient medications:    MEDICATIONS  (STANDING):  aspirin enteric coated 81 milliGRAM(s) Oral daily  atorvastatin 80 milliGRAM(s) Oral at bedtime  gabapentin 300 milliGRAM(s) Oral two times a day  insulin glargine Injectable (LANTUS) 8 Unit(s) SubCutaneous at bedtime  isosorbide   mononitrate ER Tablet (IMDUR) 30 milliGRAM(s) Oral daily  metoprolol tartrate 50 milliGRAM(s) Oral two times a day  multivitamin 1 Tablet(s) Oral daily  NIFEdipine XL 30 milliGRAM(s) Oral daily    MEDICATIONS  (PRN):      Allergies    ertapenem (Urticaria)  Purell (Rash)    Intolerances        REVIEW OF SYSTEMS  Constitutional: Denies fever, chills  HEENT: Denies sore throat, runny nose, blurry vision  Respiratory: Denies shortness of breath, dyspnea on exertion, cough, sputum production, wheezing, hemoptysis  Cardiovascular: Denies chest pain, palpitations, edema  Gastrointestinal: Denies nausea, vomiting, diarrhea, constipation, abdominal pain, melena, hematochezia   Musculoskeletal: +pain in right ankle  Neurologic: +numbness in bilateral feet    ROS negative except as noted above    VITAL SIGNS:   Vital Signs Last 24 Hrs  T(C): 36.5 (07 Feb 2020 14:22), Max: 36.5 (07 Feb 2020 14:22)  T(F): 97.7 (07 Feb 2020 14:22), Max: 97.7 (07 Feb 2020 14:22)  HR: 72 (07 Feb 2020 14:22) (72 - 72)  BP: 130/57 (07 Feb 2020 14:22) (130/57 - 130/57)  BP(mean): --  RR: 16 (07 Feb 2020 14:22) (16 - 16)  SpO2: 97% (07 Feb 2020 14:22) (97% - 97%)    I&O's Summary      PHYSICAL EXAM:    Constitutional: NAD, awake and alert  Eyes:  EOMI, no oral cyanosis, conjunctivae clear, anicteric.  Pulmonary: Diminished breath sounds in bilateral lung bases  Cardiovascular:  regular S1 and S2. No murmur.  No rubs, gallops or clicks  Gastrointestinal: Bowel Sounds present, soft, nontender.   Lymph: +bruising and swelling around right medial ankle  Neurological: Alert, strength and sensitivity are grossly intact  Skin: Ecchymoses around right ankle.     LABS: All Labs Reviewed:                        11.9   11.34 )-----------( 278      ( 07 Feb 2020 15:39 )             38.3     07 Feb 2020 15:39    135    |  96     |  17     ----------------------------<  279    3.8     |  33     |  2.90     Ca    8.7        07 Feb 2020 15:39    TPro  8.1    /  Alb  2.9    /  TBili  0.4    /  DBili  x      /  AST  16     /  ALT  16     /  AlkPhos  195    07 Feb 2020 15:39    PT/INR - ( 07 Feb 2020 15:39 )   PT: 13.8 sec;   INR: 1.22 ratio         PTT - ( 07 Feb 2020 15:39 )  PTT:30.8 sec        RADIOLOGY:    EXAM:  FOOT RIGHT (MINIMUM 3 VIEWS)                          EXAM:  ANKLE RIGHT (MINIMUM 3 V)                          EXAM:  LEG AP&LAT - RIGHT                            PROCEDURE DATE:  02/07/2020          INTERPRETATION:  Right tib-fib, right ankle, and right foot. Patient has local pain after fall.    Right tib-fib. 2 views.    There is very advanced knee degeneration.    Arterial calcifications are noted.    Upper tib-fib are intact.    Right ankle. 3 views.    There is a bimalleolar fracture with dislocation of the tibia medially on the talus. The fibular fracture is comminuted.    There is an inferior calcaneal spur.    Right foot. 3 views.    Bones of the foot are unremarkable.    IMPRESSION: Bimalleolar fracture with dislocation at the ankle. Advanced knee degeneration. Advanced arterial calcification.        ELIZA THOMPSON M.D., ATTENDING RADIOLOGIST  This document has been electronically signed. Feb 7 2020  3:07PM        EKG: NSR with RBBB at 74 bpm  Impression/Plan:

## 2020-02-07 NOTE — ED PROVIDER NOTE - OBJECTIVE STATEMENT
70 to F p/w twisted R ankle today, co pain to R ankle. Pt was initially able to walk on it, now with inc pain and swelling after going out to her dialysis (had full dialysis). Pt now unable to bear wt on r ankle. no numb/ting/focal weak. No head inj. No neck / back pain. no numb/ting/focal weak. no agg/allev factors. No other co.

## 2020-02-07 NOTE — H&P ADULT - NSHPLABSRESULTS_GEN_ALL_CORE
02-07    135  |  96  |  17  ----------------------------<  279<H>  3.8   |  33<H>  |  2.90<H>    Ca    8.7      07 Feb 2020 15:39    TPro  8.1  /  Alb  2.9<L>  /  TBili  0.4  /  DBili  x   /  AST  16  /  ALT  16  /  AlkPhos  195<H>  02-07                            11.9   11.34 )-----------( 278      ( 07 Feb 2020 15:39 )             38.3             LIVER FUNCTIONS - ( 07 Feb 2020 15:39 )  Alb: 2.9 g/dL / Pro: 8.1 g/dL / ALK PHOS: 195 U/L / ALT: 16 U/L / AST: 16 U/L / GGT: x             PT/INR - ( 07 Feb 2020 15:39 )   PT: 13.8 sec;   INR: 1.22 ratio         PTT - ( 07 Feb 2020 15:39 )  PTT:30.8 sec

## 2020-02-07 NOTE — H&P ADULT - NSICDXPASTSURGICALHX_GEN_ALL_CORE_FT
PAST SURGICAL HISTORY:  Cataract IOL b/l    H/O: hysterectomy 1990    History of biliary stent insertion 11/2018 & removal 7/2/19    S/P arteriovenous (AV) fistula creation 05/17/2019

## 2020-02-07 NOTE — ED ADULT NURSE NOTE - OBJECTIVE STATEMENT
Received the patient in the Er. patient is alert and oriented. Skin warm and dry. S/P twisted right ankle. Unable to walk, pain and swelling to right ankle.

## 2020-02-07 NOTE — CONSULT NOTE ADULT - ASSESSMENT
A/P: 70y Female with R ankle Jethro fracture s/p closed reduction and splinting      -Pt being admitted to medicine  -Case and imaging discussed with Dr. Joe. Given good Reduction/Splinting and patients co morbidities, plna for non operative managment at this time.   -NWB RLE in Splint  -Ice and Elevate   - Pain control  - Keep splint clean, dry and intact until follow up  - Cane/crutches/walker as needed for ambulation   -c/w medical/cardio management  -DC planning  -C/w HD sessions as needed   - Follow up with Dr. Joe in 10-14 days when discharged from the hospital  -No further ortho intervention at this time  -Ortho stable

## 2020-02-07 NOTE — CONSULT NOTE ADULT - SUBJECTIVE AND OBJECTIVE BOX
Nephrology Consultation: MD CLAUDIA Wood, IRENE  70y    HPI:    71 yo F with PMH of ESRD on HD (M, W, F), CAD s/p stents 2007, DM, HTN, MI, biliary stent, Liver abscess who presents to the ED with a history of fall and found to have fracture of the ankle. She had dialysis today which was uneventful.  Pt admits to weakness. Pt denies CP, palpitations, SOB, cough, myalgias, rash. Denies any use of NSAIDS    PAST MEDICAL & SURGICAL HISTORY:  H/O diabetic neuropathy  PAF (paroxysmal atrial fibrillation): 1 episode in 11/2018 while hospitalized for acute cholecystitis  Chronic pancreatitis  Cholecystitis with cholangitis  Acute urinary retention  Liver abscess: resolved  ESRD (end stage renal disease) on dialysis: MWF since 05/2018  CAD (coronary artery disease): s/p stent x 1 in 2007  Diabetes: Type 2 DM  Myocardial infarction: 2007  Hypertension  Cataract: IOL b/l  S/P arteriovenous (AV) fistula creation: 05/17/2019  History of biliary stent insertion: 11/2018 &amp; removal 7/2/19  H/O: hysterectomy: 1990      Allergies    ertapenem (Urticaria)  Purell (Rash)    Intolerances        Home Medications:  aspirin 81 mg oral delayed release tablet: 1 tab(s) orally once a day (10 Sep 2019 07:17)  atorvastatin 80 mg oral tablet: 1 tab(s) orally once a day (at bedtime) (10 Sep 2019 07:17)  Basaglar KwikPen 100 units/mL subcutaneous solution: 8 unit(s) subcutaneous once a day (at bedtime) (10 Sep 2019 07:06)  Basaglar KwikPen 100 units/mL subcutaneous solution: 6 unit(s) subcutaneous once a day - morning  (10 Sep 2019 07:06)  gabapentin 300 mg oral capsule: 1 cap(s) orally 2 times a day (10 Sep 2019 07:17)  isosorbide mononitrate 30 mg oral tablet, extended release: 1 tab(s) orally once a day (at bedtime) (10 Sep 2019 07:17)  isosorbide mononitrate 60 mg oral tablet, extended release: 1 tab(s) orally once a day AM (10 Sep 2019 07:17)  metoprolol tartrate 50 mg oral tablet: 1 tab(s) orally 2 times a day (10 Sep 2019 07:17)  Nephro-Jose Daniel oral tablet: 0.8 mg 1 tab(s) orally once a day (10 Sep 2019 07:17)  NIFEdipine 60 mg oral tablet, extended release: 1 tab(s) orally once a day AM (10 Sep 2019 07:17)        FAMILY HISTORY:      SOCIAL HISTORY:    REVIEW OF SYSTEMS:    Constitutional: No fever, weight loss or fatigue  Eyes: No eye pain, visual disturbances, or discharge  ENT:  No difficulty hearing, tinnitus, vertigo; No sinus or throat pain  Neck: No pain or stiffness  Breasts: No pain, masses or nipple discharge  Respiratory: No cough, wheezing, chills or hemoptysis  Cardiovascular: No chest pain, palpitations, shortness of breath, dizziness or leg swelling  Gastrointestinal: No abdominal or epigastric pain. No nausea, vomiting or hematemesis; No diarrhea or constipation. No melena or hematochezia.  Genitourinary: No dysuria, frequency, hematuria or incontinence  Rectal: No pain, hemorrhoids or incontinence  Neurological: No headaches, memory loss, loss of strength, numbness or tremors  Skin: No itching, burning, rashes or lesions   Lymph Nodes: No enlarged glands  Endocrine: No heat or cold intolerance; No hair loss  Musculoskeletal: No joint pain or swelling; No muscle, back or extremity pain  Psychiatric: No depression, anxiety, mood swings or difficulty sleeping  Heme/Lymph: No easy bruising or bleeding gums  Allergy and Immunologic: No hives or eczema    current medications:    aspirin enteric coated 81 milliGRAM(s) Oral daily  atorvastatin 80 milliGRAM(s) Oral at bedtime  dextrose 40% Gel 15 Gram(s) Oral once PRN  dextrose 5%. 1000 milliLiter(s) IV Continuous <Continuous>  dextrose 50% Injectable 12.5 Gram(s) IV Push once  dextrose 50% Injectable 25 Gram(s) IV Push once  dextrose 50% Injectable 25 Gram(s) IV Push once  gabapentin 300 milliGRAM(s) Oral two times a day  glucagon  Injectable 1 milliGRAM(s) IntraMuscular once PRN  insulin glargine Injectable (LANTUS) 8 Unit(s) SubCutaneous at bedtime  insulin lispro (HumaLOG) corrective regimen sliding scale   SubCutaneous three times a day before meals  insulin lispro (HumaLOG) corrective regimen sliding scale   SubCutaneous at bedtime  isosorbide   mononitrate ER Tablet (IMDUR) 30 milliGRAM(s) Oral daily  metoprolol tartrate 50 milliGRAM(s) Oral two times a day  multivitamin 1 Tablet(s) Oral daily  NIFEdipine XL 30 milliGRAM(s) Oral daily      Vital Signs Last 24 Hrs  T(C): 36.2 (07 Feb 2020 17:30), Max: 36.5 (07 Feb 2020 14:22)  T(F): 97.2 (07 Feb 2020 17:30), Max: 97.7 (07 Feb 2020 14:22)  HR: 78 (07 Feb 2020 17:30) (72 - 78)  BP: 161/71 (07 Feb 2020 17:30) (130/57 - 161/71)  BP(mean): --  RR: 14 (07 Feb 2020 17:30) (14 - 16)  SpO2: 96% (07 Feb 2020 17:30) (96% - 97%)    PHYSICAL EXAM:    Constitutional: NAD, well-groomed, well-developed  HEENT: PERRLA, EOMI, Normal Hearing, MMM  Neck: No LAD, No JVD  Back: Normal spine flexure, No CVA tenderness  Respiratory: CTAB/L   Cardiovascular: S1 and S2, RRR, no M/G/R  Gastrointestinal: BS+, soft, NT/ND  Extremities: one plus  peripheral edema  Vascular: 2+ peripheral pulses  Neurological: A/O x 3, no focal deficits  Skin: No rashes      LABS:                        11.9   11.34 )-----------( 278      ( 07 Feb 2020 15:39 )             38.3     02-07    135  |  96  |  17  ----------------------------<  279<H>  3.8   |  33<H>  |  2.90<H>    Ca    8.7      07 Feb 2020 15:39    TPro  8.1  /  Alb  2.9<L>  /  TBili  0.4  /  DBili  x   /  AST  16  /  ALT  16  /  AlkPhos  195<H>  02-07    PT/INR - ( 07 Feb 2020 15:39 )   PT: 13.8 sec;   INR: 1.22 ratio         PTT - ( 07 Feb 2020 15:39 )  PTT:30.8 sec    Creatinine Trend: 2.90<--    MICROBIOLOGY:  RECENT CULTURES:        RADIOLOGY & ADDITIONAL STUDIES:

## 2020-02-07 NOTE — H&P ADULT - NSHPPHYSICALEXAM_GEN_ALL_CORE
· Physical Examination: R ankle: pos tend with mod swelling diffuse to ankle , med and lat . FROM with min pain. nl foot / 5th mt, nl prox tib/fib/knee. 2+ pulses. nl cap refill  · CONSTITUTIONAL: Well appearing, awake, alert, oriented to person, place, time/situation and in no apparent distress.  · ENMT: Airway patent, Nasal mucosa clear. Mouth with normal mucosa. Throat has no vesicles, no oropharyngeal exudates and uvula is midline. no ext signs of head trauma.  · EYES: Clear bilaterally, pupils equal, round and reactive to light.  · CARDIAC: Normal rate, regular rhythm.  Heart sounds S1, S2.  No murmurs, rubs or gallops.  · RESPIRATORY: Breath sounds clear and equal bilaterally. nl resp effort. no acc muscle use  · MUSCULOSKELETAL: Spine appears normal, range of motion is not limited, no muscle or joint tenderness  · NEUROLOGICAL: Alert and oriented, no focal deficits, no motor or sensory deficits.  · SKIN: Skin normal color for race, warm, dry and intact. No evidence of rash.  · PSYCHIATRIC: Alert and oriented to person, place, time/situation. normal mood and affect. no apparent risk to self or others.  · HEME LYMPH: No adenopathy or splenomegaly. No cervical or inguinal lymphadenopathy.

## 2020-02-07 NOTE — CONSULT NOTE ADULT - SUBJECTIVE AND OBJECTIVE BOX
Orthopedic Surgery Consult Note    70y Female p/w Right Ankle pain/deformity and inability to bear weight s/p mechanical fall that occurred earlier today. Pt reports she was walking after her hemodialysis visit  Denies headstrike/LOC. Pt has a hx of diabetic neuropathey BL LE. Pt is a community ambulator with Walker. No other bone or joint complaints.                           11.9   11.34 )-----------( 278      ( 07 Feb 2020 15:39 )             38.3     07 Feb 2020 15:39    135    |  96     |  17     ----------------------------<  279    3.8     |  33     |  2.90     Ca    8.7        07 Feb 2020 15:39    TPro  8.1    /  Alb  2.9    /  TBili  0.4    /  DBili  x      /  AST  16     /  ALT  16     /  AlkPhos  195    07 Feb 2020 15:39    PT/INR - ( 07 Feb 2020 15:39 )   PT: 13.8 sec;   INR: 1.22 ratio         PTT - ( 07 Feb 2020 15:39 )  PTT:30.8 sec  Vital Signs Last 24 Hrs  T(C): 36.9 (02-07-20 @ 19:21), Max: 36.9 (02-07-20 @ 19:21)  T(F): 98.4 (02-07-20 @ 19:21), Max: 98.4 (02-07-20 @ 19:21)  HR: 71 (02-07-20 @ 19:21) (71 - 78)  BP: 175/81 (02-07-20 @ 19:21) (130/57 - 175/81)  BP(mean): --  RR: 18 (02-07-20 @ 19:21) (14 - 18)  SpO2: 97% (02-07-20 @ 19:21) (96% - 97%)    Physical Exam  Gen: Nad  R/L LE: Skin intact, +skin tenting medially +TTP medial/lateral malleolus  motor intact distally  SILT s/s/sp/dp/t  2+ DP    Imaging:  XR showing R ankle jeremiah fracture     Procedure: Closed reduction performed followed by placement of a well padded trilam splint. Patient tolerated the procedure well. Post procedure imaging obtained and showed improved alignment. Post procedure the patient was NV intact.

## 2020-02-07 NOTE — ED ADULT NURSE NOTE - NSIMPLEMENTINTERV_GEN_ALL_ED
Implemented All Fall Risk Interventions:  [place] to call system. Call bell, personal items and telephone within reach. Instruct patient to call for assistance. Room bathroom lighting operational. Non-slip footwear when patient is off stretcher. Physically safe environment: no spills, clutter or unnecessary equipment. Stretcher in lowest position, wheels locked, appropriate side rails in place. Provide visual cue, wrist band, yellow gown, etc. Monitor gait and stability. Monitor for mental status changes and reorient to person, place, and time. Review medications for side effects contributing to fall risk. Reinforce activity limits and safety measures with patient and family.

## 2020-02-07 NOTE — CONSULT NOTE ADULT - ATTENDING COMMENTS
I personally saw and examined the patient in detail.  I have spoken to the above provider regarding the assessment and plan of care.  I reviewed the above assessment and plan of care, and agree.  I have made changes in the body of the note where appropriate..  Optimized for the OR from a cardiac point of view with no evidence of active ischemic heart disease, decompensated heart failure, severe obstructive valvular disease, or uncontrolled arrhythmia.

## 2020-02-07 NOTE — ED PROVIDER NOTE - PHYSICAL EXAMINATION
R ankle: pos tend with mod swelling diffuse to ankle , med and lat . FROM with min pain. nl foot / 5th mt, nl prox tib/fib/knee. 2+ pulses. nl cap refill

## 2020-02-08 DIAGNOSIS — Z29.9 ENCOUNTER FOR PROPHYLACTIC MEASURES, UNSPECIFIED: ICD-10-CM

## 2020-02-08 DIAGNOSIS — E11.65 TYPE 2 DIABETES MELLITUS WITH HYPERGLYCEMIA: ICD-10-CM

## 2020-02-08 DIAGNOSIS — S82.841A DISPLACED BIMALLEOLAR FRACTURE OF RIGHT LOWER LEG, INITIAL ENCOUNTER FOR CLOSED FRACTURE: ICD-10-CM

## 2020-02-08 DIAGNOSIS — E11.9 TYPE 2 DIABETES MELLITUS WITHOUT COMPLICATIONS: ICD-10-CM

## 2020-02-08 PROCEDURE — 99232 SBSQ HOSP IP/OBS MODERATE 35: CPT

## 2020-02-08 RX ORDER — METOPROLOL TARTRATE 50 MG
50 TABLET ORAL
Refills: 0 | Status: DISCONTINUED | OUTPATIENT
Start: 2020-02-08 | End: 2020-02-13

## 2020-02-08 RX ORDER — ISOSORBIDE MONONITRATE 60 MG/1
60 TABLET, EXTENDED RELEASE ORAL DAILY
Refills: 0 | Status: DISCONTINUED | OUTPATIENT
Start: 2020-02-08 | End: 2020-02-13

## 2020-02-08 RX ORDER — ACETAMINOPHEN 500 MG
650 TABLET ORAL EVERY 6 HOURS
Refills: 0 | Status: DISCONTINUED | OUTPATIENT
Start: 2020-02-08 | End: 2020-02-13

## 2020-02-08 RX ORDER — NIFEDIPINE 30 MG
30 TABLET, EXTENDED RELEASE 24 HR ORAL DAILY
Refills: 0 | Status: DISCONTINUED | OUTPATIENT
Start: 2020-02-08 | End: 2020-02-13

## 2020-02-08 RX ORDER — INSULIN GLARGINE 100 [IU]/ML
12 INJECTION, SOLUTION SUBCUTANEOUS AT BEDTIME
Refills: 0 | Status: ACTIVE | OUTPATIENT
Start: 2020-02-08 | End: 2021-01-06

## 2020-02-08 RX ORDER — INSULIN LISPRO 100/ML
VIAL (ML) SUBCUTANEOUS
Refills: 0 | Status: DISCONTINUED | OUTPATIENT
Start: 2020-02-08 | End: 2020-02-13

## 2020-02-08 RX ORDER — INSULIN LISPRO 100/ML
VIAL (ML) SUBCUTANEOUS AT BEDTIME
Refills: 0 | Status: DISCONTINUED | OUTPATIENT
Start: 2020-02-08 | End: 2020-02-13

## 2020-02-08 RX ORDER — HEPARIN SODIUM 5000 [USP'U]/ML
5000 INJECTION INTRAVENOUS; SUBCUTANEOUS EVERY 12 HOURS
Refills: 0 | Status: DISCONTINUED | OUTPATIENT
Start: 2020-02-08 | End: 2020-02-11

## 2020-02-08 RX ADMIN — GABAPENTIN 300 MILLIGRAM(S): 400 CAPSULE ORAL at 17:55

## 2020-02-08 RX ADMIN — Medication 81 MILLIGRAM(S): at 11:57

## 2020-02-08 RX ADMIN — Medication 30 MILLIGRAM(S): at 17:55

## 2020-02-08 RX ADMIN — HEPARIN SODIUM 5000 UNIT(S): 5000 INJECTION INTRAVENOUS; SUBCUTANEOUS at 17:55

## 2020-02-08 RX ADMIN — GABAPENTIN 300 MILLIGRAM(S): 400 CAPSULE ORAL at 06:13

## 2020-02-08 RX ADMIN — Medication 10: at 16:55

## 2020-02-08 RX ADMIN — Medication 2: at 11:57

## 2020-02-08 RX ADMIN — Medication 30 MILLIGRAM(S): at 06:13

## 2020-02-08 RX ADMIN — Medication 650 MILLIGRAM(S): at 22:31

## 2020-02-08 RX ADMIN — Medication 650 MILLIGRAM(S): at 23:30

## 2020-02-08 RX ADMIN — Medication 3: at 08:05

## 2020-02-08 RX ADMIN — Medication 50 MILLIGRAM(S): at 17:55

## 2020-02-08 RX ADMIN — HEPARIN SODIUM 5000 UNIT(S): 5000 INJECTION INTRAVENOUS; SUBCUTANEOUS at 09:42

## 2020-02-08 RX ADMIN — Medication 650 MILLIGRAM(S): at 15:32

## 2020-02-08 RX ADMIN — Medication 50 MILLIGRAM(S): at 06:13

## 2020-02-08 RX ADMIN — ATORVASTATIN CALCIUM 80 MILLIGRAM(S): 80 TABLET, FILM COATED ORAL at 22:34

## 2020-02-08 RX ADMIN — ISOSORBIDE MONONITRATE 60 MILLIGRAM(S): 60 TABLET, EXTENDED RELEASE ORAL at 13:56

## 2020-02-08 RX ADMIN — Medication 1 TABLET(S): at 11:57

## 2020-02-08 RX ADMIN — INSULIN GLARGINE 12 UNIT(S): 100 INJECTION, SOLUTION SUBCUTANEOUS at 22:34

## 2020-02-08 RX ADMIN — Medication 650 MILLIGRAM(S): at 16:32

## 2020-02-08 NOTE — PHYSICAL THERAPY INITIAL EVALUATION ADULT - PERTINENT HX OF CURRENT PROBLEM, REHAB EVAL
71 y/o female adm 2/7 s/p fall after dialysis treatment and pt twisted her R ankle. + closed bimalleolar fx. s/p closed reduction and splinting in trilam splint

## 2020-02-08 NOTE — PROGRESS NOTE ADULT - ASSESSMENT
71 yo F with PMH of ESRD on HD (M, W, F), CAD s/p stents 2007, DM, HTN, MI, biliary stent, Liver abscess who presents to the ED with a history of fall and found to have fracture of the right ankle.   Continue the pan medications and recs as per ortho. will schedule for dialysis on monday. spoke to patient's dtr at bed side.

## 2020-02-08 NOTE — CONSULT NOTE ADULT - SUBJECTIVE AND OBJECTIVE BOX
Patient is a 70y old  Female who presents with a chief complaint of fall (08 Feb 2020 12:21)      Reason For Consult: dm2 uncontrolled    HPI:  · HPI Objective Statement: 70 to F p/w twisted R ankle today, co pain to R ankle. Pt was initially able to walk on it, now with inc pain and swelling after going out to her dialysis (had full dialysis). Pt now unable to bear wt on r ankle. no numb/ting/focal weak. No head inj. No neck / back pain. no numb/ting/focal weak. no agg/allev factors. No other co.	  · Negative Findings: no numbness, no tingling, no weakness	  In ER patient was found to have ankle fracture.  Patient is being admitted for further work up and treatment. (07 Feb 2020 16:33)      PAST MEDICAL & SURGICAL HISTORY:  H/O diabetic neuropathy  PAF (paroxysmal atrial fibrillation): 1 episode in 11/2018 while hospitalized for acute cholecystitis  Chronic pancreatitis  Cholecystitis with cholangitis  Acute urinary retention  Liver abscess: resolved  ESRD (end stage renal disease) on dialysis: MWF since 05/2018  CAD (coronary artery disease): s/p stent x 1 in 2007  Diabetes: Type 2 DM  Myocardial infarction: 2007  Hypertension  Cataract: IOL b/l  S/P arteriovenous (AV) fistula creation: 05/17/2019  History of biliary stent insertion: 11/2018 &amp; removal 7/2/19  H/O: hysterectomy: 1990      FAMILY HISTORY:        Social History:    MEDICATIONS  (STANDING):  aspirin enteric coated 81 milliGRAM(s) Oral daily  atorvastatin 80 milliGRAM(s) Oral at bedtime  dextrose 5%. 1000 milliLiter(s) (50 mL/Hr) IV Continuous <Continuous>  dextrose 50% Injectable 12.5 Gram(s) IV Push once  dextrose 50% Injectable 25 Gram(s) IV Push once  dextrose 50% Injectable 25 Gram(s) IV Push once  gabapentin 300 milliGRAM(s) Oral two times a day  heparin  Injectable 5000 Unit(s) SubCutaneous every 12 hours  insulin glargine Injectable (LANTUS) 12 Unit(s) SubCutaneous at bedtime  insulin lispro (HumaLOG) corrective regimen sliding scale   SubCutaneous three times a day before meals  insulin lispro (HumaLOG) corrective regimen sliding scale   SubCutaneous at bedtime  isosorbide   mononitrate ER Tablet (IMDUR) 60 milliGRAM(s) Oral daily  metoprolol tartrate 50 milliGRAM(s) Oral two times a day  multivitamin 1 Tablet(s) Oral daily  NIFEdipine XL 30 milliGRAM(s) Oral daily    MEDICATIONS  (PRN):  acetaminophen   Tablet .. 650 milliGRAM(s) Oral every 6 hours PRN Mild Pain (1 - 3)  dextrose 40% Gel 15 Gram(s) Oral once PRN Blood Glucose LESS THAN 70 milliGRAM(s)/deciLiter  glucagon  Injectable 1 milliGRAM(s) IntraMuscular once PRN Glucose <70 milliGRAM(s)/deciLiter        T(C): 37 (02-08-20 @ 16:04), Max: 37.6 (02-08-20 @ 07:21)  HR: 74 (02-08-20 @ 16:04) (71 - 82)  BP: 163/74 (02-08-20 @ 16:04) (138/76 - 175/81)  RR: 18 (02-08-20 @ 16:04) (14 - 20)  SpO2: 98% (02-08-20 @ 16:04) (95% - 98%)  Wt(kg): --    PHYSICAL EXAM:  GENERAL: NAD, well-groomed, well-developed  HEAD:  Atraumatic, Normocephalic  NECK: Supple, No JVD, Normal thyroid  CHEST/LUNG: Clear to percussion bilaterally; No rales, rhonchi, wheezing, or rubs  HEART: Regular rate and rhythm; No murmurs, rubs, or gallops  ABDOMEN: Soft, Nontender, Nondistended; Bowel sounds present  EXTREMITIES:  2+ Peripheral Pulses, No clubbing, cyanosis, or edema  SKIN: No rashes or lesions    CAPILLARY BLOOD GLUCOSE      POCT Blood Glucose.: 351 mg/dL (08 Feb 2020 16:34)  POCT Blood Glucose.: 250 mg/dL (08 Feb 2020 11:27)  POCT Blood Glucose.: 255 mg/dL (08 Feb 2020 07:44)  POCT Blood Glucose.: 353 mg/dL (07 Feb 2020 21:06)                            11.9   11.34 )-----------( 278      ( 07 Feb 2020 15:39 )             38.3       CMP:  02-07 @ 15:39  SGPT 16  Albumin 2.9   Alk Phos 195   Anion Gap 6   SGOT 16   Total Bili 0.4   BUN 17   Calcium Total 8.7   CO2 33   Chloride 96   Creatinine 2.90   eGFR if AA 18   eGFR if non AA 16   Glucose 279   Potassium 3.8   Protein 8.1   Sodium 135      Thyroid Function Tests:      Diabetes Tests: 02-07 @ 21:14 HbA1c 9.4 C-Peptide --         Radiology:

## 2020-02-08 NOTE — PROGRESS NOTE ADULT - SUBJECTIVE AND OBJECTIVE BOX
[facility] -- Glynn Bullock, Claudia Davis Pannella, Patel, Savella  Office # [phone]      Follow Up:  Preop optimization    Subjective/Observations: Seen and examined.  Sitting in bed resting.  Reports having cough but denies cp, sob or palpitations.  She states her pain is manageable.  NAD.        REVIEW OF SYSTEMS: All other review of systems is negative unless indicated above    PAST MEDICAL & SURGICAL HISTORY:  H/O diabetic neuropathy  PAF (paroxysmal atrial fibrillation): 1 episode in 11/2018 while hospitalized for acute cholecystitis  Chronic pancreatitis  Cholecystitis with cholangitis  Acute urinary retention  Liver abscess: resolved  ESRD (end stage renal disease) on dialysis: MWF since 05/2018  CAD (coronary artery disease): s/p stent x 1 in 2007  Diabetes: Type 2 DM  Myocardial infarction: 2007  Hypertension  Cataract: IOL b/l  S/P arteriovenous (AV) fistula creation: 05/17/2019  History of biliary stent insertion: 11/2018 &amp; removal 7/2/19  H/O: hysterectomy: 1990      MEDICATIONS  (STANDING):  aspirin enteric coated 81 milliGRAM(s) Oral daily  atorvastatin 80 milliGRAM(s) Oral at bedtime  dextrose 5%. 1000 milliLiter(s) (50 mL/Hr) IV Continuous <Continuous>  dextrose 50% Injectable 12.5 Gram(s) IV Push once  dextrose 50% Injectable 25 Gram(s) IV Push once  dextrose 50% Injectable 25 Gram(s) IV Push once  gabapentin 300 milliGRAM(s) Oral two times a day  heparin  Injectable 5000 Unit(s) SubCutaneous every 12 hours  insulin glargine Injectable (LANTUS) 8 Unit(s) SubCutaneous at bedtime  insulin lispro (HumaLOG) corrective regimen sliding scale   SubCutaneous three times a day before meals  insulin lispro (HumaLOG) corrective regimen sliding scale   SubCutaneous at bedtime  isosorbide   mononitrate ER Tablet (IMDUR) 60 milliGRAM(s) Oral daily  metoprolol tartrate 50 milliGRAM(s) Oral two times a day  multivitamin 1 Tablet(s) Oral daily  NIFEdipine XL 30 milliGRAM(s) Oral daily    MEDICATIONS  (PRN):  dextrose 40% Gel 15 Gram(s) Oral once PRN Blood Glucose LESS THAN 70 milliGRAM(s)/deciLiter  glucagon  Injectable 1 milliGRAM(s) IntraMuscular once PRN Glucose <70 milliGRAM(s)/deciLiter      Allergies    ertapenem (Urticaria)  Purell (Rash)    Intolerances            Vital Signs Last 24 Hrs  T(C): 37.6 (08 Feb 2020 07:21), Max: 37.6 (08 Feb 2020 07:21)  T(F): 99.6 (08 Feb 2020 07:21), Max: 99.6 (08 Feb 2020 07:21)  HR: 77 (08 Feb 2020 07:21) (71 - 82)  BP: 156/78 (08 Feb 2020 07:21) (130/57 - 175/81)  BP(mean): --  RR: 17 (08 Feb 2020 07:21) (14 - 20)  SpO2: 96% (08 Feb 2020 07:21) (95% - 97%)    I&O's Summary    07 Feb 2020 07:01  -  08 Feb 2020 07:00  --------------------------------------------------------  IN: 0 mL / OUT: 0 mL / NET: 0 mL      Weight (kg): 78.3 (02-07 @ 19:21)    PHYSICAL EXAM:  TELE: Not on tele  Constitutional: NAD, awake and alert, well-developed, obese  HEENT: Moist Mucous Membranes, Anicteric  Pulmonary: Non-labored, breath sounds are clear bilaterally + dry cough, No wheezing, rales or rhonchi  Cardiovascular: Regular, S1 and S2, No murmurs, rubs, gallops or clicks  Gastrointestinal: Bowel Sounds present, soft, nontender.   Lymph: No peripheral edema. No lymphadenopathy.  Skin: Right LE with ace wrap cast in place and ice in place. HD catheter RCW and AV fistula Left arm  Psych:  Mood & affect appropriate    LABS: All Labs Reviewed:                        11.9   11.34 )-----------( 278      ( 07 Feb 2020 15:39 )             38.3     07 Feb 2020 15:39    135    |  96     |  17     ----------------------------<  279    3.8     |  33     |  2.90     Ca    8.7        07 Feb 2020 15:39    TPro  8.1    /  Alb  2.9    /  TBili  0.4    /  DBili  x      /  AST  16     /  ALT  16     /  AlkPhos  195    07 Feb 2020 15:39    PT/INR - ( 07 Feb 2020 15:39 )   PT: 13.8 sec;   INR: 1.22 ratio         PTT - ( 07 Feb 2020 15:39 )  PTT:30.8 sec    < from: 12 Lead ECG (02.07.20 @ 16:08) >  Ventricular Rate 74 BPM    Atrial Rate 74 BPM    P-R Interval 158 ms    QRS Duration 138 ms    Q-T Interval 446 ms    QTC Calculation(Bezet) 495 ms    P Axis 33 degrees    R Axis 94 degrees    T Axis -12 degrees    Diagnosis Line Normal sinus rhythm  Right bundle branch block  Inferior infarct (cited on or before 29-MAY-2019)  Confirmed by ARANZA CROCKETT ([id number]) on 2/8/2020 12:03:48 PM    < end of copied text >    < from: TTE Echo Doppler w/o Cont (05.04.18 @ 20:56) >  EXAM:  ECHO TTE W/O CON COMP W/DOPPLR         PROCEDURE DATE:  05/04/2018        INTERPRETATION:  Height 165cm. weight 95kg. blood pressure 129/81.   Technician TE. Indication for study dyspnea.    Aortic root 2.3 cm left atrium 4.4 cm left ventricular end-diastolic   diameter 5.7 cm left ventricular end-systolic diameter 4.3 cm septal wall   thickness 1.2 cm posterior wall thickness 1.2 cm visually equivocally   estimated ejection fraction 50-55%.    The aortic root is calcified and of normaldiameter. 3 distinct leaflets   of the aortic valve are not well demonstrated by this study. The   technician was unable to identify any significant gradient across this   valve to suggest hemodynamically significant aortic stenosis. Left atrium   isenlarged. The left ventricle was difficult to visualize by all   standard echocardiographic windows. Possibly the end-diastolic diameter   is mildly increased. The wall thickness is borderline increased. Any   significant focal wall motion abnormality cannot be ascertained by this   study. Visually estimated ejection fraction 50-55%. The mitral annulus is   calcified. The mitral valve leaflets are mildly thickened with a normal   EF slope and excursion. There is no evidence for hemodynamically   significant mitral stenosis. On the very limited color flow Doppler   portion examination mild mitral regurgitation suggested.    Conclusion:  1. Technically difficult limited supine study. Please note that this is a   portable study and the study is also limited due to patient's body   habitus.  2. 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  3. Enlarged left atrium with associated mild mitral regurgitation.  4. Very limited visualization left ventricle which may represent mild   left ventricular concentric hypertrophy with a well-preserved ejection   fraction as described above.  5. A very small posterior pericardial effusion is suggested but not   diagnostic.  6. Correlate these limited findings clinically.                    SALVADOR PHILLIPS M.D., ATTENDING CARDIOLOGIST  This document has been electronically signed. May  5 2018  9:47AM        < end of copied text >      < from: Xray Ankle Complete 3 Views, Right (02.07.20 @ 18:25) >  EXAM:  ANKLE RIGHT (MINIMUM 3 V)                            PROCEDURE DATE:  02/07/2020          INTERPRETATION:  Right ankle. 5 views.    Prior study showed bimalleolar fracture with dislocation at the ankle.    On present film splint applied.    The disalignment seventh essentially been totally reduced and presently limits with good.    IMPRESSION: Good reduction. Splinting.                ELIZA THOMPSON M.D., ATTENDING RADIOLOGIST  This document has been electronically signed. Feb 7 2020  7:10PM        < end of copied text >    < from: Xray Tibia + Fibula 2 Views, Right (02.07.20 @ 15:02) >  EXAM:  FOOT RIGHT (MINIMUM 3 VIEWS)                          EXAM:  ANKLE RIGHT (MINIMUM 3 V)                          EXAM:  LEG AP&LAT - RIGHT                            PROCEDURE DATE:  02/07/2020          INTERPRETATION:  Right tib-fib, right ankle, and right foot. Patient has local pain after fall.    Right tib-fib. 2 views.    There is very advanced knee degeneration.    Arterial calcifications are noted.    Upper tib-fib are intact.    Right ankle. 3 views.    There is a bimalleolar fracture with dislocation of the tibia medially on the talus. The fibular fracture is comminuted.    There is an inferior calcaneal spur.    Right foot. 3 views.    Bones of the foot are unremarkable.    IMPRESSION: Bimalleolar fracture with dislocation at the ankle. Advanced knee degeneration. Advanced arterial calcification.                ELIZA THOMPSON M.D., ATTENDING RADIOLOGIST  This document has been electronically signed. Feb 7 2020  3:07PM        < end of copied text >

## 2020-02-08 NOTE — CONSULT NOTE ADULT - PROBLEM SELECTOR RECOMMENDATION 9
cont lantus 12 units qhs  change mod dose humalog scale coverage qac/qhs  goal bg 100-180 in hosp setting

## 2020-02-08 NOTE — PROGRESS NOTE ADULT - SUBJECTIVE AND OBJECTIVE BOX
Patient is a 70y old  Female who presents with a chief complaint of fall (08 Feb 2020 16:38)      INTERVAL /OVERNIGHT EVENTS: c/o right ankle pain    MEDICATIONS  (STANDING):  aspirin enteric coated 81 milliGRAM(s) Oral daily  atorvastatin 80 milliGRAM(s) Oral at bedtime  dextrose 5%. 1000 milliLiter(s) (50 mL/Hr) IV Continuous <Continuous>  dextrose 50% Injectable 12.5 Gram(s) IV Push once  dextrose 50% Injectable 25 Gram(s) IV Push once  dextrose 50% Injectable 25 Gram(s) IV Push once  gabapentin 300 milliGRAM(s) Oral two times a day  heparin  Injectable 5000 Unit(s) SubCutaneous every 12 hours  insulin glargine Injectable (LANTUS) 12 Unit(s) SubCutaneous at bedtime  insulin lispro (HumaLOG) corrective regimen sliding scale   SubCutaneous three times a day before meals  insulin lispro (HumaLOG) corrective regimen sliding scale   SubCutaneous at bedtime  isosorbide   mononitrate ER Tablet (IMDUR) 60 milliGRAM(s) Oral daily  metoprolol tartrate 50 milliGRAM(s) Oral two times a day  multivitamin 1 Tablet(s) Oral daily  NIFEdipine XL 30 milliGRAM(s) Oral daily    MEDICATIONS  (PRN):  acetaminophen   Tablet .. 650 milliGRAM(s) Oral every 6 hours PRN Mild Pain (1 - 3)  dextrose 40% Gel 15 Gram(s) Oral once PRN Blood Glucose LESS THAN 70 milliGRAM(s)/deciLiter  glucagon  Injectable 1 milliGRAM(s) IntraMuscular once PRN Glucose <70 milliGRAM(s)/deciLiter      Allergies    ertapenem (Urticaria)  Purell (Rash)    Intolerances        REVIEW OF SYSTEMS:  CONSTITUTIONAL: No fever, weight loss, or fatigue  EYES: No eye pain, visual disturbances, or discharge  ENMT:  No difficulty hearing, tinnitus, vertigo; No sinus or throat pain  NECK: No pain or stiffness  RESPIRATORY: No cough, wheezing, chills or hemoptysis; No shortness of breath  CARDIOVASCULAR: No chest pain, palpitations, dizziness, or leg swelling  GASTROINTESTINAL: No abdominal or epigastric pain. No nausea, vomiting, or hematemesis; No diarrhea or constipation. No melena or hematochezia.  GENITOURINARY: No dysuria, frequency, hematuria, or incontinence  NEUROLOGICAL: No headaches, memory loss, loss of strength, numbness, or tremors  SKIN: No itching, burning, rashes, or lesions   LYMPH NODES: No enlarged glands  ENDOCRINE: No heat or cold intolerance; No hair loss; No polydipsia or polyuria  MUSCULOSKELETAL: + joint pain or swelling; No muscle, back, or extremity pain  PSYCHIATRIC: No depression, anxiety, mood swings, or difficulty sleeping  HEME/LYMPH: No easy bruising, or bleeding gums  ALLERGY AND IMMUNOLOGIC: No hives or eczema    Vital Signs Last 24 Hrs  T(C): 37 (08 Feb 2020 16:04), Max: 37.6 (08 Feb 2020 07:21)  T(F): 98.6 (08 Feb 2020 16:04), Max: 99.6 (08 Feb 2020 07:21)  HR: 74 (08 Feb 2020 16:04) (74 - 82)  BP: 163/74 (08 Feb 2020 16:04) (138/76 - 163/74)  BP(mean): --  RR: 18 (08 Feb 2020 16:04) (17 - 20)  SpO2: 98% (08 Feb 2020 16:04) (95% - 98%)    PHYSICAL EXAM:  GENERAL: NAD, well-groomed, well-developed  HEAD:  Atraumatic, Normocephalic  EYES: EOMI, PERRLA, conjunctiva and sclera clear  ENMT: No tonsillar erythema, exudates, or enlargement; Moist mucous membranes, Good dentition, No lesions  NECK: Supple, No JVD, Normal thyroid  NERVOUS SYSTEM:  Alert & Oriented X3, Good concentration; Motor Strength 5/5 B/L upper and lower extremities; DTRs 2+ intact and symmetric  CHEST/LUNG: Clear to auscultation bilaterally; No rales, rhonchi, wheezing, or rubs  HEART: Regular rate and rhythm; No murmurs, rubs, or gallops  ABDOMEN: Soft, Nontender, Nondistended; Bowel sounds present  EXTREMITIES:  2+ Peripheral Pulses, No clubbing, cyanosis, or edema  LYMPH: No lymphadenopathy noted  SKIN: No rashes or lesions    LABS:      Ca    8.7        07 Feb 2020 15:39      PT/INR - ( 07 Feb 2020 15:39 )   PT: 13.8 sec;   INR: 1.22 ratio         PTT - ( 07 Feb 2020 15:39 )  PTT:30.8 sec    CAPILLARY BLOOD GLUCOSE      POCT Blood Glucose.: 351 mg/dL (08 Feb 2020 16:34)  POCT Blood Glucose.: 250 mg/dL (08 Feb 2020 11:27)  POCT Blood Glucose.: 255 mg/dL (08 Feb 2020 07:44)  POCT Blood Glucose.: 353 mg/dL (07 Feb 2020 21:06)      RADIOLOGY & ADDITIONAL TESTS:    Notes Reviewed:  [x ] YES  [ ] NO    Care Discussed with Consultants/Other Providers [x ] YES  [ ] NO

## 2020-02-08 NOTE — PHYSICAL THERAPY INITIAL EVALUATION ADULT - ADDITIONAL COMMENTS
Pt lives with her spouse and family in a house, + chair lift. Pt ambulates independently with RW and is mostly independent with ADLs. Pt goes to dialysis. Information obtained from CM notes in EMR.

## 2020-02-08 NOTE — PROGRESS NOTE ADULT - ASSESSMENT
71 yo F PMH HTN, DM, MI s/p stent x1 in 2007, ESRD on HD (MWF), diabetic neuropathy being admitted with right ankle fracture with plan for surgery.     Pre op optimization   - Patient is class III risk according to RCRI. She has a poor exercise tolerance, cannot perform >/ 4 METS, although it is not clear if that is due to cardiac pathology or neuropathy. She has no active MI, CAD, HF exacerbation. She had recent cholecystectomy and did well. She is considered optimized from cardiovascular standpoint to proceed with planned procedure     Right ankle fracture  - s/p R ankle Jethro fracture s/p closed reduction and splinting tolerated procedure well  - non-operative management no plans for surgical intervention at this time as per ortho recs  - Pain control  - Ortho following    CAD  - continue atorvastatin 80 mg daily  - continue aspirin 81 mg  - continue metoprolol tartrate 50 mg BID  - No acute changes on EKG compared to previous  - Previous ECHO in 5/18 showed EF 50-55 percent with no significant valvular abnormalities.    ESRD on HD  - Volume removal via HD as per Renal  - Next planned HD for Monday  - appears to be euvolemic  - monitor and replete lytes, keep K>4, Mg>2.  No Labs today    DVT prophylaxis  - Heparin SQ as per primary      - Other cardiovascular workup will depend on clinical course.  - All other workup per primary team  - Will follow     DOV Sanchez-BC  Cardiology 69 yo F PMH HTN, DM, MI s/p stent x1 in 2007, ESRD on HD (MWF), diabetic neuropathy being admitted with right ankle fracture with plan for surgery.          Right ankle fracture  - s/p R ankle Jethro fracture s/p closed reduction and splinting tolerated procedure well  - non-operative management no plans for surgical intervention at this time as per ortho recs  - Pain control  - Ortho following    CAD  - continue atorvastatin 80 mg daily  - continue aspirin 81 mg  - continue metoprolol tartrate 50 mg BID  - No acute changes on EKG compared to previous  - Previous ECHO in 5/18 showed EF 50-55 percent with no significant valvular abnormalities.    ESRD on HD  - Volume removal via HD as per Renal  - Next planned HD for Monday  - appears to be euvolemic  - monitor and replete lytes, keep K>4, Mg>2.  No Labs today    DVT prophylaxis  - Heparin SQ as per primary      - Other cardiovascular workup will depend on clinical course.  - All other workup per primary team  - Will follow     DOV Sanchez-BC  Cardiology

## 2020-02-08 NOTE — PROGRESS NOTE ADULT - SUBJECTIVE AND OBJECTIVE BOX
CLAUDIA IRENE  70y  Female    Patient is a 70y old  Female who presents with a chief complaint of fall (07 Feb 2020 19:33)      feels much better  less pain today.       PAST MEDICAL & SURGICAL HISTORY:  H/O diabetic neuropathy  PAF (paroxysmal atrial fibrillation): 1 episode in 11/2018 while hospitalized for acute cholecystitis  Chronic pancreatitis  Cholecystitis with cholangitis  Acute urinary retention  Liver abscess: resolved  ESRD (end stage renal disease) on dialysis: MWF since 05/2018  CAD (coronary artery disease): s/p stent x 1 in 2007  Diabetes: Type 2 DM  Myocardial infarction: 2007  Hypertension  Cataract: IOL b/l  S/P arteriovenous (AV) fistula creation: 05/17/2019  History of biliary stent insertion: 11/2018 &amp; removal 7/2/19  H/O: hysterectomy: 1990          PHYSICAL EXAM:    T(C): 37.6 (02-08-20 @ 07:21), Max: 37.6 (02-08-20 @ 07:21)  HR: 77 (02-08-20 @ 07:21) (71 - 82)  BP: 156/78 (02-08-20 @ 07:21) (130/57 - 175/81)  RR: 17 (02-08-20 @ 07:21) (14 - 20)  SpO2: 96% (02-08-20 @ 07:21) (95% - 97%)  Wt(kg): --    I&O's Detail    07 Feb 2020 07:01  -  08 Feb 2020 07:00  --------------------------------------------------------  IN:  Total IN: 0 mL    OUT:  Total OUT: 0 mL    Total NET: 0 mL          Respiratory: clear anteriorly, decreased BS at bases  Cardiovascular: S1 S2  Gastrointestinal: soft NT ND +BS  Extremities: one plus edema   Neuro: Awake and alert    MEDICATIONS  (STANDING):  aspirin enteric coated 81 milliGRAM(s) Oral daily  atorvastatin 80 milliGRAM(s) Oral at bedtime  dextrose 5%. 1000 milliLiter(s) (50 mL/Hr) IV Continuous <Continuous>  dextrose 50% Injectable 12.5 Gram(s) IV Push once  dextrose 50% Injectable 25 Gram(s) IV Push once  dextrose 50% Injectable 25 Gram(s) IV Push once  gabapentin 300 milliGRAM(s) Oral two times a day  heparin  Injectable 5000 Unit(s) SubCutaneous every 12 hours  insulin glargine Injectable (LANTUS) 8 Unit(s) SubCutaneous at bedtime  insulin lispro (HumaLOG) corrective regimen sliding scale   SubCutaneous three times a day before meals  insulin lispro (HumaLOG) corrective regimen sliding scale   SubCutaneous at bedtime  isosorbide   mononitrate ER Tablet (IMDUR) 60 milliGRAM(s) Oral daily  metoprolol tartrate 50 milliGRAM(s) Oral two times a day  multivitamin 1 Tablet(s) Oral daily  NIFEdipine XL 30 milliGRAM(s) Oral daily    MEDICATIONS  (PRN):  dextrose 40% Gel 15 Gram(s) Oral once PRN Blood Glucose LESS THAN 70 milliGRAM(s)/deciLiter  glucagon  Injectable 1 milliGRAM(s) IntraMuscular once PRN Glucose <70 milliGRAM(s)/deciLiter                            11.9   11.34 )-----------( 278      ( 07 Feb 2020 15:39 )             38.3       02-07    135  |  96  |  17  ----------------------------<  279<H>  3.8   |  33<H>  |  2.90<H>    Ca    8.7      07 Feb 2020 15:39    TPro  8.1  /  Alb  2.9<L>  /  TBili  0.4  /  DBili  x   /  AST  16  /  ALT  16  /  AlkPhos  195<H>  02-07      Creatinine Trend: Creatinine Trend: 2.90<--

## 2020-02-09 LAB
ANION GAP SERPL CALC-SCNC: 8 MMOL/L — SIGNIFICANT CHANGE UP (ref 5–17)
BASOPHILS # BLD AUTO: 0.03 K/UL — SIGNIFICANT CHANGE UP (ref 0–0.2)
BASOPHILS NFR BLD AUTO: 0.3 % — SIGNIFICANT CHANGE UP (ref 0–2)
BUN SERPL-MCNC: 58 MG/DL — HIGH (ref 7–23)
CALCIUM SERPL-MCNC: 8.2 MG/DL — LOW (ref 8.5–10.1)
CHLORIDE SERPL-SCNC: 96 MMOL/L — SIGNIFICANT CHANGE UP (ref 96–108)
CO2 SERPL-SCNC: 28 MMOL/L — SIGNIFICANT CHANGE UP (ref 22–31)
CREAT SERPL-MCNC: 5.3 MG/DL — HIGH (ref 0.5–1.3)
EOSINOPHIL # BLD AUTO: 0.27 K/UL — SIGNIFICANT CHANGE UP (ref 0–0.5)
EOSINOPHIL NFR BLD AUTO: 2.3 % — SIGNIFICANT CHANGE UP (ref 0–6)
GLUCOSE SERPL-MCNC: 280 MG/DL — HIGH (ref 70–99)
HCT VFR BLD CALC: 33.2 % — LOW (ref 34.5–45)
HGB BLD-MCNC: 10.3 G/DL — LOW (ref 11.5–15.5)
IMM GRANULOCYTES NFR BLD AUTO: 0.4 % — SIGNIFICANT CHANGE UP (ref 0–1.5)
LYMPHOCYTES # BLD AUTO: 29.8 % — SIGNIFICANT CHANGE UP (ref 13–44)
LYMPHOCYTES # BLD AUTO: 3.44 K/UL — HIGH (ref 1–3.3)
MCHC RBC-ENTMCNC: 29.4 PG — SIGNIFICANT CHANGE UP (ref 27–34)
MCHC RBC-ENTMCNC: 31 GM/DL — LOW (ref 32–36)
MCV RBC AUTO: 94.9 FL — SIGNIFICANT CHANGE UP (ref 80–100)
MONOCYTES # BLD AUTO: 0.63 K/UL — SIGNIFICANT CHANGE UP (ref 0–0.9)
MONOCYTES NFR BLD AUTO: 5.5 % — SIGNIFICANT CHANGE UP (ref 2–14)
NEUTROPHILS # BLD AUTO: 7.13 K/UL — SIGNIFICANT CHANGE UP (ref 1.8–7.4)
NEUTROPHILS NFR BLD AUTO: 61.7 % — SIGNIFICANT CHANGE UP (ref 43–77)
NRBC # BLD: 0 /100 WBCS — SIGNIFICANT CHANGE UP (ref 0–0)
PLATELET # BLD AUTO: 254 K/UL — SIGNIFICANT CHANGE UP (ref 150–400)
POTASSIUM SERPL-MCNC: 5.1 MMOL/L — SIGNIFICANT CHANGE UP (ref 3.5–5.3)
POTASSIUM SERPL-SCNC: 5.1 MMOL/L — SIGNIFICANT CHANGE UP (ref 3.5–5.3)
RBC # BLD: 3.5 M/UL — LOW (ref 3.8–5.2)
RBC # FLD: 15 % — HIGH (ref 10.3–14.5)
SODIUM SERPL-SCNC: 132 MMOL/L — LOW (ref 135–145)
WBC # BLD: 11.55 K/UL — HIGH (ref 3.8–10.5)
WBC # FLD AUTO: 11.55 K/UL — HIGH (ref 3.8–10.5)

## 2020-02-09 PROCEDURE — 99232 SBSQ HOSP IP/OBS MODERATE 35: CPT

## 2020-02-09 RX ADMIN — Medication 650 MILLIGRAM(S): at 22:30

## 2020-02-09 RX ADMIN — Medication 650 MILLIGRAM(S): at 08:57

## 2020-02-09 RX ADMIN — Medication 50 MILLIGRAM(S): at 05:55

## 2020-02-09 RX ADMIN — HEPARIN SODIUM 5000 UNIT(S): 5000 INJECTION INTRAVENOUS; SUBCUTANEOUS at 05:55

## 2020-02-09 RX ADMIN — Medication 650 MILLIGRAM(S): at 21:38

## 2020-02-09 RX ADMIN — Medication 4: at 16:57

## 2020-02-09 RX ADMIN — Medication 6: at 11:46

## 2020-02-09 RX ADMIN — Medication 1 TABLET(S): at 11:46

## 2020-02-09 RX ADMIN — Medication 81 MILLIGRAM(S): at 11:46

## 2020-02-09 RX ADMIN — Medication 50 MILLIGRAM(S): at 17:37

## 2020-02-09 RX ADMIN — Medication 650 MILLIGRAM(S): at 07:57

## 2020-02-09 RX ADMIN — Medication 4: at 07:57

## 2020-02-09 RX ADMIN — GABAPENTIN 300 MILLIGRAM(S): 400 CAPSULE ORAL at 17:36

## 2020-02-09 RX ADMIN — GABAPENTIN 300 MILLIGRAM(S): 400 CAPSULE ORAL at 05:55

## 2020-02-09 RX ADMIN — Medication 30 MILLIGRAM(S): at 05:55

## 2020-02-09 RX ADMIN — INSULIN GLARGINE 12 UNIT(S): 100 INJECTION, SOLUTION SUBCUTANEOUS at 21:32

## 2020-02-09 RX ADMIN — ISOSORBIDE MONONITRATE 60 MILLIGRAM(S): 60 TABLET, EXTENDED RELEASE ORAL at 11:46

## 2020-02-09 RX ADMIN — ATORVASTATIN CALCIUM 80 MILLIGRAM(S): 80 TABLET, FILM COATED ORAL at 21:38

## 2020-02-09 RX ADMIN — HEPARIN SODIUM 5000 UNIT(S): 5000 INJECTION INTRAVENOUS; SUBCUTANEOUS at 17:36

## 2020-02-09 NOTE — PROGRESS NOTE ADULT - SUBJECTIVE AND OBJECTIVE BOX
IRENE TAYLOR  70y  Female    Patient is a 70y old  Female who presents with a chief complaint of fall (08 Feb 2020 19:47)      out of bed to chair.   feels much better.       PAST MEDICAL & SURGICAL HISTORY:  H/O diabetic neuropathy  PAF (paroxysmal atrial fibrillation): 1 episode in 11/2018 while hospitalized for acute cholecystitis  Chronic pancreatitis  Cholecystitis with cholangitis  Acute urinary retention  Liver abscess: resolved  ESRD (end stage renal disease) on dialysis: MWF since 05/2018  CAD (coronary artery disease): s/p stent x 1 in 2007  Diabetes: Type 2 DM  Myocardial infarction: 2007  Hypertension  Cataract: IOL b/l  S/P arteriovenous (AV) fistula creation: 05/17/2019  History of biliary stent insertion: 11/2018 &amp; removal 7/2/19  H/O: hysterectomy: 1990          PHYSICAL EXAM:    T(C): 36.7 (02-09-20 @ 07:54), Max: 37 (02-08-20 @ 16:04)  HR: 66 (02-09-20 @ 07:54) (66 - 74)  BP: 133/72 (02-09-20 @ 07:54) (128/69 - 163/74)  RR: 18 (02-09-20 @ 07:54) (17 - 18)  SpO2: 99% (02-09-20 @ 07:54) (97% - 99%)  Wt(kg): --    I&O's Detail      Respiratory: clear anteriorly, decreased BS at bases  Cardiovascular: S1 S2  Gastrointestinal: soft NT ND +BS  Extremities: one plus edema   Neuro: Awake and alert    MEDICATIONS  (STANDING):  aspirin enteric coated 81 milliGRAM(s) Oral daily  atorvastatin 80 milliGRAM(s) Oral at bedtime  dextrose 5%. 1000 milliLiter(s) (50 mL/Hr) IV Continuous <Continuous>  dextrose 50% Injectable 12.5 Gram(s) IV Push once  dextrose 50% Injectable 25 Gram(s) IV Push once  dextrose 50% Injectable 25 Gram(s) IV Push once  gabapentin 300 milliGRAM(s) Oral two times a day  heparin  Injectable 5000 Unit(s) SubCutaneous every 12 hours  insulin glargine Injectable (LANTUS) 12 Unit(s) SubCutaneous at bedtime  insulin lispro (HumaLOG) corrective regimen sliding scale   SubCutaneous three times a day before meals  insulin lispro (HumaLOG) corrective regimen sliding scale   SubCutaneous at bedtime  isosorbide   mononitrate ER Tablet (IMDUR) 60 milliGRAM(s) Oral daily  metoprolol tartrate 50 milliGRAM(s) Oral two times a day  multivitamin 1 Tablet(s) Oral daily  NIFEdipine XL 30 milliGRAM(s) Oral daily    MEDICATIONS  (PRN):  acetaminophen   Tablet .. 650 milliGRAM(s) Oral every 6 hours PRN Mild Pain (1 - 3)  dextrose 40% Gel 15 Gram(s) Oral once PRN Blood Glucose LESS THAN 70 milliGRAM(s)/deciLiter  glucagon  Injectable 1 milliGRAM(s) IntraMuscular once PRN Glucose <70 milliGRAM(s)/deciLiter                            11.9   11.34 )-----------( 278      ( 07 Feb 2020 15:39 )             38.3       02-07    135  |  96  |  17  ----------------------------<  279<H>  3.8   |  33<H>  |  2.90<H>    Ca    8.7      07 Feb 2020 15:39    TPro  8.1  /  Alb  2.9<L>  /  TBili  0.4  /  DBili  x   /  AST  16  /  ALT  16  /  AlkPhos  195<H>  02-07      Creatinine Trend: Creatinine Trend: 2.90<-- generalized

## 2020-02-09 NOTE — PROGRESS NOTE ADULT - SUBJECTIVE AND OBJECTIVE BOX
[facility] -- Glnyn Bullock, Claudia Davis, Morgan Sams Savella  Office # [phone]      Follow Up:  Preop Optimization/hx of CAD w/MI stent    Subjective/Observations: Seen and examined.  Sleeping in recliner resting comfortably.  No new complaints and denies cp, sob or palpitations.  No signs of orthopnea or pnd.  NAD.       REVIEW OF SYSTEMS: All other review of systems is negative unless indicated above    PAST MEDICAL & SURGICAL HISTORY:  H/O diabetic neuropathy  PAF (paroxysmal atrial fibrillation): 1 episode in 11/2018 while hospitalized for acute cholecystitis  Chronic pancreatitis  Cholecystitis with cholangitis  Acute urinary retention  Liver abscess: resolved  ESRD (end stage renal disease) on dialysis: MWF since 05/2018  CAD (coronary artery disease): s/p stent x 1 in 2007  Diabetes: Type 2 DM  Myocardial infarction: 2007  Hypertension  Cataract: IOL b/l  S/P arteriovenous (AV) fistula creation: 05/17/2019  History of biliary stent insertion: 11/2018 &amp; removal 7/2/19  H/O: hysterectomy: 1990      MEDICATIONS  (STANDING):  aspirin enteric coated 81 milliGRAM(s) Oral daily  atorvastatin 80 milliGRAM(s) Oral at bedtime  dextrose 5%. 1000 milliLiter(s) (50 mL/Hr) IV Continuous <Continuous>  dextrose 50% Injectable 12.5 Gram(s) IV Push once  dextrose 50% Injectable 25 Gram(s) IV Push once  dextrose 50% Injectable 25 Gram(s) IV Push once  gabapentin 300 milliGRAM(s) Oral two times a day  heparin  Injectable 5000 Unit(s) SubCutaneous every 12 hours  insulin glargine Injectable (LANTUS) 12 Unit(s) SubCutaneous at bedtime  insulin lispro (HumaLOG) corrective regimen sliding scale   SubCutaneous three times a day before meals  insulin lispro (HumaLOG) corrective regimen sliding scale   SubCutaneous at bedtime  isosorbide   mononitrate ER Tablet (IMDUR) 60 milliGRAM(s) Oral daily  metoprolol tartrate 50 milliGRAM(s) Oral two times a day  multivitamin 1 Tablet(s) Oral daily  NIFEdipine XL 30 milliGRAM(s) Oral daily    MEDICATIONS  (PRN):  acetaminophen   Tablet .. 650 milliGRAM(s) Oral every 6 hours PRN Mild Pain (1 - 3)  dextrose 40% Gel 15 Gram(s) Oral once PRN Blood Glucose LESS THAN 70 milliGRAM(s)/deciLiter  glucagon  Injectable 1 milliGRAM(s) IntraMuscular once PRN Glucose <70 milliGRAM(s)/deciLiter      Allergies    ertapenem (Urticaria)  Purell (Rash)    Intolerances            Vital Signs Last 24 Hrs  T(C): 36.7 (09 Feb 2020 07:54), Max: 37 (08 Feb 2020 16:04)  T(F): 98 (09 Feb 2020 07:54), Max: 98.6 (08 Feb 2020 16:04)  HR: 66 (09 Feb 2020 07:54) (66 - 74)  BP: 133/72 (09 Feb 2020 07:54) (128/69 - 163/74)  BP(mean): --  RR: 18 (09 Feb 2020 07:54) (17 - 18)  SpO2: 99% (09 Feb 2020 07:54) (97% - 99%)    I&O's Summary        PHYSICAL EXAM:  TELE: Not on tele  Constitutional: NAD, arouses easily , well-developed, obese  HEENT: Moist Mucous Membranes, Anicteric  Pulmonary: Non-labored, breath sounds are clear anteriorly, No wheezing, rales or rhonchi  Cardiovascular: Regular, S1 and S2, No murmurs, rubs, gallops or clicks  Gastrointestinal: Bowel Sounds present, soft, nontender.   Lymph: mild left LE edema peripheral edema. No lymphadenopathy.  Skin: RLE with Acewrap/splint in place.  R ACW HD catheter in place  Psych:  Mood & affect appropriate    LABS: All Labs Reviewed:                        11.9   11.34 )-----------( 278      ( 07 Feb 2020 15:39 )             38.3     07 Feb 2020 15:39    135    |  96     |  17     ----------------------------<  279    3.8     |  33     |  2.90     Ca    8.7        07 Feb 2020 15:39    TPro  8.1    /  Alb  2.9    /  TBili  0.4    /  DBili  x      /  AST  16     /  ALT  16     /  AlkPhos  195    07 Feb 2020 15:39    PT/INR - ( 07 Feb 2020 15:39 )   PT: 13.8 sec;   INR: 1.22 ratio         PTT - ( 07 Feb 2020 15:39 )  PTT:30.8 sec    < from: 12 Lead ECG (09.10.19 @ 10:54) >  Ventricular Rate 75 BPM    Atrial Rate 75 BPM    P-R Interval 230 ms    QRS Duration 124 ms    Q-T Interval 464 ms    QTC Calculation(Bezet) 518 ms    P Axis 35 degrees    R Axis 105 degrees    T Axis -13 degrees    Diagnosis Line Sinus rhythm [id number] degree A-V block  Right bundle branch block  Inferior infarct , age undetermined  Abnormal ECG    < end of copied text >    < from: TTE Echo Doppler w/o Cont (05.04.18 @ 20:56) >   EXAM:  ECHO TTE W/O CON COMP W/DOPPLR         PROCEDURE DATE:  05/04/2018        INTERPRETATION:  Height 165cm. weight 95kg. blood pressure 129/81.   Technician TE. Indication for study dyspnea.    Aortic root 2.3 cm left atrium 4.4 cm left ventricular end-diastolic   diameter 5.7 cm left ventricular end-systolic diameter 4.3 cm septal wall   thickness 1.2 cm posterior wall thickness 1.2 cm visually equivocally   estimated ejection fraction 50-55%.    The aortic root is calcified and of normaldiameter. 3 distinct leaflets   of the aortic valve are not well demonstrated by this study. The   technician was unable to identify any significant gradient across this   valve to suggest hemodynamically significant aortic stenosis. Left atrium   isenlarged. The left ventricle was difficult to visualize by all   standard echocardiographic windows. Possibly the end-diastolic diameter   is mildly increased. The wall thickness is borderline increased. Any   significant focal wall motion abnormality cannot be ascertained by this   study. Visually estimated ejection fraction 50-55%. The mitral annulus is   calcified. The mitral valve leaflets are mildly thickened with a normal   EF slope and excursion. There is no evidence for hemodynamically   significant mitral stenosis. On the very limited color flow Doppler   portion examination mild mitral regurgitation suggested.    Conclusion:  1. Technically difficult limited supine study. Please note that this is a   portable study and the study is also limited due to patient's body   habitus.  2. 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  3. Enlarged left atrium with associated mild mitral regurgitation.  4. Very limited visualization left ventricle which may represent mild   left ventricular concentric hypertrophy with a well-preserved ejection   fraction as described above.  5. A very small posterior pericardial effusion is suggested but not   diagnostic.  6. Correlate these limited findings clinically.                    SALVADOR PHILLIPS M.D., ATTENDING CARDIOLOGIST  This document has been electronically signed. May  5 2018  9:47AM                < end of copied text >     < from: Xray Ankle Complete 3 Views, Right (02.07.20 @ 18:25) >  EXAM:  ANKLE RIGHT (MINIMUM 3 V)                            PROCEDURE DATE:  02/07/2020          INTERPRETATION:  Right ankle. 5 views.    Prior study showed bimalleolar fracture with dislocation at the ankle.    On present film splint applied.    The disalignment seventh essentially been totally reduced and presently limits with good.    IMPRESSION: Good reduction. Splinting.                ELIZA THOMPSON M.D., ATTENDING RADIOLOGIST  This document has been electronically signed. Feb 7 2020  7:10PM        < end of copied text >

## 2020-02-09 NOTE — PROGRESS NOTE ADULT - ASSESSMENT
71 yo F PMH HTN, DM, MI s/p stent x1 in 2007, ESRD on HD (MWF), diabetic neuropathy being admitted with right ankle fracture with plan for surgery.          Right ankle fracture  - s/p R ankle Jethro fracture s/p closed reduction and splinting tolerated procedure well  - non-operative management no plans for surgical intervention at this time as per ortho recs  - Pain control  - Ortho following    CAD  - continue atorvastatin 80 mg daily  - continue aspirin 81 mg  - continue metoprolol tartrate 50 mg BID  - continue Imdur 60 mg daily  - No acute changes on EKG compared to previous  - Previous ECHO in 5/18 showed EF 50-55 percent with no significant valvular abnormalities.    HTN  - Well controlled  - continue Imdur, Nifedipine and metoprolol  - Monitor routine hemodynamics    ESRD on HD  - Volume removal via HD as per Renal  - Next planned HD for Monday  - appears to be euvolemic  - monitor and replete lytes, keep K>4, Mg>2.  No Labs today or yesterday- ordered    DVT prophylaxis  - Heparin SQ as per primary    - Other cardiovascular workup will depend on clinical course.  - All other workup per primary team  - Will follow     - D/C planning to [facility] as per primary team/CM    DOV Sanchez-BC  Cardiology

## 2020-02-09 NOTE — PROGRESS NOTE ADULT - ASSESSMENT
71 yo F with PMH of ESRD on HD (M, W, F), CAD s/p stents 2007, DM, HTN, MI, biliary stent, Liver abscess who presents to the ED with a history of fall and found to have fracture of the right ankle. Continue the pan medications and recs as per ortho. will schedule for dialysis for tomorrow.  spoke to patient's dtr at bed side. 36.6

## 2020-02-10 ENCOUNTER — TRANSCRIPTION ENCOUNTER (OUTPATIENT)
Age: 70
End: 2020-02-10

## 2020-02-10 DIAGNOSIS — N18.6 END STAGE RENAL DISEASE: ICD-10-CM

## 2020-02-10 LAB
ALBUMIN SERPL ELPH-MCNC: 2.4 G/DL — LOW (ref 3.3–5)
ALP SERPL-CCNC: 152 U/L — HIGH (ref 40–120)
ALT FLD-CCNC: 13 U/L — SIGNIFICANT CHANGE UP (ref 12–78)
ANION GAP SERPL CALC-SCNC: 11 MMOL/L — SIGNIFICANT CHANGE UP (ref 5–17)
AST SERPL-CCNC: 12 U/L — LOW (ref 15–37)
BILIRUB SERPL-MCNC: 0.4 MG/DL — SIGNIFICANT CHANGE UP (ref 0.2–1.2)
BUN SERPL-MCNC: 64 MG/DL — HIGH (ref 7–23)
CALCIUM SERPL-MCNC: 8.3 MG/DL — LOW (ref 8.5–10.1)
CHLORIDE SERPL-SCNC: 101 MMOL/L — SIGNIFICANT CHANGE UP (ref 96–108)
CO2 SERPL-SCNC: 25 MMOL/L — SIGNIFICANT CHANGE UP (ref 22–31)
CREAT SERPL-MCNC: 6.1 MG/DL — HIGH (ref 0.5–1.3)
GLUCOSE SERPL-MCNC: 234 MG/DL — HIGH (ref 70–99)
HCT VFR BLD CALC: 31.8 % — LOW (ref 34.5–45)
HGB BLD-MCNC: 9.7 G/DL — LOW (ref 11.5–15.5)
MCHC RBC-ENTMCNC: 28.9 PG — SIGNIFICANT CHANGE UP (ref 27–34)
MCHC RBC-ENTMCNC: 30.5 GM/DL — LOW (ref 32–36)
MCV RBC AUTO: 94.6 FL — SIGNIFICANT CHANGE UP (ref 80–100)
NRBC # BLD: 0 /100 WBCS — SIGNIFICANT CHANGE UP (ref 0–0)
PLATELET # BLD AUTO: 257 K/UL — SIGNIFICANT CHANGE UP (ref 150–400)
POTASSIUM SERPL-MCNC: 5 MMOL/L — SIGNIFICANT CHANGE UP (ref 3.5–5.3)
POTASSIUM SERPL-SCNC: 5 MMOL/L — SIGNIFICANT CHANGE UP (ref 3.5–5.3)
PROT SERPL-MCNC: 7 G/DL — SIGNIFICANT CHANGE UP (ref 6–8.3)
RBC # BLD: 3.36 M/UL — LOW (ref 3.8–5.2)
RBC # FLD: 15.1 % — HIGH (ref 10.3–14.5)
SODIUM SERPL-SCNC: 137 MMOL/L — SIGNIFICANT CHANGE UP (ref 135–145)
WBC # BLD: 11.99 K/UL — HIGH (ref 3.8–10.5)
WBC # FLD AUTO: 11.99 K/UL — HIGH (ref 3.8–10.5)

## 2020-02-10 PROCEDURE — 99232 SBSQ HOSP IP/OBS MODERATE 35: CPT

## 2020-02-10 RX ORDER — CHLORHEXIDINE GLUCONATE 213 G/1000ML
1 SOLUTION TOPICAL DAILY
Refills: 0 | Status: DISCONTINUED | OUTPATIENT
Start: 2020-02-10 | End: 2020-02-13

## 2020-02-10 RX ORDER — GABAPENTIN 400 MG/1
1 CAPSULE ORAL
Qty: 0 | Refills: 0 | DISCHARGE
Start: 2020-02-10

## 2020-02-10 RX ORDER — ACETAMINOPHEN 500 MG
2 TABLET ORAL
Qty: 0 | Refills: 0 | DISCHARGE
Start: 2020-02-10

## 2020-02-10 RX ORDER — ISOSORBIDE MONONITRATE 60 MG/1
1 TABLET, EXTENDED RELEASE ORAL
Qty: 0 | Refills: 0 | DISCHARGE
Start: 2020-02-10

## 2020-02-10 RX ORDER — CHLORHEXIDINE GLUCONATE 213 G/1000ML
1 SOLUTION TOPICAL
Qty: 0 | Refills: 0 | DISCHARGE
Start: 2020-02-10

## 2020-02-10 RX ORDER — DIPHENHYDRAMINE HCL 50 MG
25 CAPSULE ORAL ONCE
Refills: 0 | Status: COMPLETED | OUTPATIENT
Start: 2020-02-10 | End: 2020-02-10

## 2020-02-10 RX ORDER — ERYTHROPOIETIN 10000 [IU]/ML
10000 INJECTION, SOLUTION INTRAVENOUS; SUBCUTANEOUS
Refills: 0 | Status: DISCONTINUED | OUTPATIENT
Start: 2020-02-10 | End: 2020-02-12

## 2020-02-10 RX ADMIN — Medication 50 MILLIGRAM(S): at 21:02

## 2020-02-10 RX ADMIN — Medication 81 MILLIGRAM(S): at 11:13

## 2020-02-10 RX ADMIN — Medication 30 MILLIGRAM(S): at 05:57

## 2020-02-10 RX ADMIN — Medication 2: at 21:12

## 2020-02-10 RX ADMIN — HEPARIN SODIUM 5000 UNIT(S): 5000 INJECTION INTRAVENOUS; SUBCUTANEOUS at 08:04

## 2020-02-10 RX ADMIN — INSULIN GLARGINE 12 UNIT(S): 100 INJECTION, SOLUTION SUBCUTANEOUS at 21:12

## 2020-02-10 RX ADMIN — Medication 650 MILLIGRAM(S): at 22:54

## 2020-02-10 RX ADMIN — Medication 4: at 12:10

## 2020-02-10 RX ADMIN — Medication 25 MILLIGRAM(S): at 16:06

## 2020-02-10 RX ADMIN — Medication 1 TABLET(S): at 11:13

## 2020-02-10 RX ADMIN — Medication 650 MILLIGRAM(S): at 16:47

## 2020-02-10 RX ADMIN — Medication 650 MILLIGRAM(S): at 08:05

## 2020-02-10 RX ADMIN — HEPARIN SODIUM 5000 UNIT(S): 5000 INJECTION INTRAVENOUS; SUBCUTANEOUS at 21:03

## 2020-02-10 RX ADMIN — ERYTHROPOIETIN 10000 UNIT(S): 10000 INJECTION, SOLUTION INTRAVENOUS; SUBCUTANEOUS at 18:33

## 2020-02-10 RX ADMIN — Medication 2: at 08:04

## 2020-02-10 RX ADMIN — Medication 50 MILLIGRAM(S): at 05:57

## 2020-02-10 RX ADMIN — Medication 2: at 17:16

## 2020-02-10 RX ADMIN — ATORVASTATIN CALCIUM 80 MILLIGRAM(S): 80 TABLET, FILM COATED ORAL at 21:03

## 2020-02-10 RX ADMIN — CHLORHEXIDINE GLUCONATE 1 APPLICATION(S): 213 SOLUTION TOPICAL at 11:16

## 2020-02-10 RX ADMIN — Medication 650 MILLIGRAM(S): at 23:40

## 2020-02-10 RX ADMIN — Medication 650 MILLIGRAM(S): at 17:38

## 2020-02-10 RX ADMIN — ISOSORBIDE MONONITRATE 60 MILLIGRAM(S): 60 TABLET, EXTENDED RELEASE ORAL at 11:14

## 2020-02-10 RX ADMIN — GABAPENTIN 300 MILLIGRAM(S): 400 CAPSULE ORAL at 06:04

## 2020-02-10 RX ADMIN — GABAPENTIN 300 MILLIGRAM(S): 400 CAPSULE ORAL at 21:03

## 2020-02-10 RX ADMIN — Medication 650 MILLIGRAM(S): at 09:00

## 2020-02-10 NOTE — PROGRESS NOTE ADULT - SUBJECTIVE AND OBJECTIVE BOX
CAPILLARY BLOOD GLUCOSE      POCT Blood Glucose.: 200 mg/dL (10 Feb 2020 07:44)  POCT Blood Glucose.: 222 mg/dL (09 Feb 2020 21:04)  POCT Blood Glucose.: 242 mg/dL (09 Feb 2020 16:42)  POCT Blood Glucose.: 274 mg/dL (09 Feb 2020 11:38)      Vital Signs Last 24 Hrs  T(C): 36.8 (10 Feb 2020 07:51), Max: 36.8 (10 Feb 2020 07:51)  T(F): 98.3 (10 Feb 2020 07:51), Max: 98.3 (10 Feb 2020 07:51)  HR: 67 (10 Feb 2020 07:51) (67 - 77)  BP: 130/75 (10 Feb 2020 07:51) (130/75 - 146/74)  BP(mean): --  RR: 18 (10 Feb 2020 07:51) (18 - 18)  SpO2: 98% (10 Feb 2020 07:51) (96% - 98%)    General: WN/WD NAD  Respiratory: CTA B/L  CV: RRR, S1S2, no murmurs, rubs or gallops  Abdominal: Soft, NT, ND +BS, Last BM  Extremities: No edema, + peripheral pulses    Hemoglobin A1C, Whole Blood: 9.4 % (02-07 @ 21:14)   02-10    137  |  101  |  64<H>  ----------------------------<  234<H>  5.0   |  25  |  6.10<H>    Ca    8.3<L>      10 Feb 2020 07:14    TPro  7.0  /  Alb  2.4<L>  /  TBili  0.4  /  DBili  x   /  AST  12<L>  /  ALT  13  /  AlkPhos  152<H>  02-10      atorvastatin 80 milliGRAM(s) Oral at bedtime  dextrose 40% Gel 15 Gram(s) Oral once PRN  dextrose 50% Injectable 12.5 Gram(s) IV Push once  dextrose 50% Injectable 25 Gram(s) IV Push once  dextrose 50% Injectable 25 Gram(s) IV Push once  glucagon  Injectable 1 milliGRAM(s) IntraMuscular once PRN  insulin glargine Injectable (LANTUS) 12 Unit(s) SubCutaneous at bedtime  insulin lispro (HumaLOG) corrective regimen sliding scale   SubCutaneous three times a day before meals  insulin lispro (HumaLOG) corrective regimen sliding scale   SubCutaneous at bedtime

## 2020-02-10 NOTE — PROGRESS NOTE ADULT - ASSESSMENT
69 yo F PMH HTN, DM, MI s/p stent x1 in 2007, ESRD on HD (MWF), diabetic neuropathy being admitted with right ankle fracture with plan for surgery. Patient now s/p closed reduction and splinting    Right ankle fracture  - s/p R ankle Jethro fracture s/p closed reduction and splinting tolerated procedure well  - non-operative management no plans for surgical intervention at this time as per ortho recs  - Pain control  - Ortho following    CAD  - continue atorvastatin 80 mg daily  - continue aspirin 81 mg  - continue metoprolol tartrate 50 mg BID  - continue Imdur 60 mg daily  - No acute changes on EKG compared to previous  - Previous ECHO in 5/18 showed EF 50-55 percent with no significant valvular abnormalities.    HTN  - Well controlled  - continue Imdur, Nifedipine and metoprolol  - Monitor routine hemodynamics    ESRD on HD  - Volume removal via HD as per Renal  - Next planned HD today  - appears to be euvolemic  - monitor and replete lytes, keep K>4, Mg>2.      DVT prophylaxis  - Heparin SQ as per primary      - Monitor and replete lytes, keep K>4, Mg>2.  - All other medical needs as per primary team.  - Other cardiovascular workup will depend on clinical course.  - Will continue to follow.      Caroline Medrano, DAIJA AGACNP, FNP-C  Nurse Practitioner- Cardiology   Spectra #[phone]

## 2020-02-10 NOTE — DISCHARGE NOTE PROVIDER - NSDCMRMEDTOKEN_GEN_ALL_CORE_FT
acetaminophen 325 mg oral tablet: 2 tab(s) orally every 6 hours, As needed, Mild Pain (1 - 3)  aspirin 81 mg oral delayed release tablet: 1 tab(s) orally once a day  atorvastatin 80 mg oral tablet: 1 tab(s) orally once a day (at bedtime)  Basaglar KwikPen 100 units/mL subcutaneous solution: 8 unit(s) subcutaneous once a day (at bedtime)  Basaglar KwikPen 100 units/mL subcutaneous solution: 6 unit(s) subcutaneous once a day - morning   chlorhexidine 2% topical pad: 1 application topically once a day  gabapentin 300 mg oral capsule: 1 cap(s) orally 2 times a day  isosorbide mononitrate 60 mg oral tablet, extended release: 1 tab(s) orally once a day  metoprolol tartrate 50 mg oral tablet: 1 tab(s) orally 2 times a day  Nephro-Jose Daniel oral tablet: 0.8 mg 1 tab(s) orally once a day  NIFEdipine 60 mg oral tablet, extended release: 1 tab(s) orally once a day AM

## 2020-02-10 NOTE — DISCHARGE NOTE PROVIDER - HOSPITAL COURSE
69 yo F with PMH of ESRD on HD (M, W, F), CAD s/p stents 2007, DM, HTN, MI, biliary stent, Liver abscess who presents to the ED with a history of fall and found to have fracture of the right ankle. Ortho consulted s/p splint and reduction , -NWB RLE in Splint and reduction, Pain control    - Cane/crutches/walker as needed for ambulation     HD as per renal.         DM Endo consulted     Discharge planning.

## 2020-02-10 NOTE — PROGRESS NOTE ADULT - SUBJECTIVE AND OBJECTIVE BOX
Patient is a 70y old  Female who presents with a chief complaint of fall (10 Feb 2020 12:33)      Patient seen in follow up for ESRD on HD.     PAST MEDICAL HISTORY:  H/O diabetic neuropathy  PAF (paroxysmal atrial fibrillation)  Chronic pancreatitis  Cholecystitis with cholangitis  Acute urinary retention  Liver abscess  ESRD (end stage renal disease) on dialysis  CAD (coronary artery disease)  Diabetes  CRF (chronic renal failure)  Hypertension  Pneumonia  Myocardial infarction  Hypertension  Diabetes    MEDICATIONS  (STANDING):  aspirin enteric coated 81 milliGRAM(s) Oral daily  atorvastatin 80 milliGRAM(s) Oral at bedtime  chlorhexidine 2% Cloths 1 Application(s) Topical daily  dextrose 5%. 1000 milliLiter(s) (50 mL/Hr) IV Continuous <Continuous>  dextrose 50% Injectable 12.5 Gram(s) IV Push once  dextrose 50% Injectable 25 Gram(s) IV Push once  dextrose 50% Injectable 25 Gram(s) IV Push once  gabapentin 300 milliGRAM(s) Oral two times a day  heparin  Injectable 5000 Unit(s) SubCutaneous every 12 hours  insulin glargine Injectable (LANTUS) 12 Unit(s) SubCutaneous at bedtime  insulin lispro (HumaLOG) corrective regimen sliding scale   SubCutaneous three times a day before meals  insulin lispro (HumaLOG) corrective regimen sliding scale   SubCutaneous at bedtime  isosorbide   mononitrate ER Tablet (IMDUR) 60 milliGRAM(s) Oral daily  metoprolol tartrate 50 milliGRAM(s) Oral two times a day  multivitamin 1 Tablet(s) Oral daily  NIFEdipine XL 30 milliGRAM(s) Oral daily    MEDICATIONS  (PRN):  acetaminophen   Tablet .. 650 milliGRAM(s) Oral every 6 hours PRN Mild Pain (1 - 3)  dextrose 40% Gel 15 Gram(s) Oral once PRN Blood Glucose LESS THAN 70 milliGRAM(s)/deciLiter  glucagon  Injectable 1 milliGRAM(s) IntraMuscular once PRN Glucose <70 milliGRAM(s)/deciLiter    T(C): 36.8 (02-10-20 @ 07:51), Max: 36.8 (02-10-20 @ 07:51)  HR: 73 (02-10-20 @ 11:13) (66 - 77)  BP: 121/75 (02-10-20 @ 11:13) (121/75 - 146/74)  RR: 18 (02-10-20 @ 07:51) (17 - 18)  SpO2: 98% (02-10-20 @ 07:51) (96% - 99%)  Wt(kg): --  I&O's Detail    10 Feb 2020 07:01  -  10 Feb 2020 13:52  --------------------------------------------------------  IN:    Oral Fluid: 250 mL  Total IN: 250 mL    OUT:    Voided: 300 mL  Total OUT: 300 mL    Total NET: -50 mL          PHYSICAL EXAM:  General: NAD  Respiratory: b/l air entry  Cardiovascular: S1 S2  Gastrointestinal: soft  Extremities:  Rt ankle dressing                          9.7    11.99 )-----------( 257      ( 10 Feb 2020 07:25 )             31.8     02-10    137  |  101  |  64<H>  ----------------------------<  234<H>  5.0   |  25  |  6.10<H>    Ca    8.3<L>      10 Feb 2020 07:14    TPro  7.0  /  Alb  2.4<L>  /  TBili  0.4  /  DBili  x   /  AST  12<L>  /  ALT  13  /  AlkPhos  152<H>  02-10        LIVER FUNCTIONS - ( 10 Feb 2020 07:14 )  Alb: 2.4 g/dL / Pro: 7.0 g/dL / ALK PHOS: 152 U/L / ALT: 13 U/L / AST: 12 U/L / GGT: x               Sodium, Serum: 137 (02-10 @ 07:14)  Sodium, Serum: 132 (02-09 @ 13:16)  Sodium, Serum: 135 (02-07 @ 15:39)    Creatinine, Serum: 6.10 (02-10 @ 07:14)  Creatinine, Serum: 5.30 (02-09 @ 13:16)  Creatinine, Serum: 2.90 (02-07 @ 15:39)    Potassium, Serum: 5.0 (02-10 @ 07:14)  Potassium, Serum: 5.1 (02-09 @ 13:16)  Potassium, Serum: 3.8 (02-07 @ 15:39)    Hemoglobin: 9.7 (02-10 @ 07:25)  Hemoglobin: 10.3 (02-09 @ 13:16)  Hemoglobin: 11.9 (02-07 @ 15:39)          < from: Xray Ankle Complete 3 Views, Right (02.07.20 @ 15:02) >    EXAM:  FOOT RIGHT (MINIMUM 3 VIEWS)                          EXAM:  ANKLE RIGHT (MINIMUM 3 V)                          EXAM:  LEG AP&LAT - RIGHT                            PROCEDURE DATE:  02/07/2020          INTERPRETATION:  Right tib-fib, right ankle, and right foot. Patient has local pain after fall.    Right tib-fib. 2 views.    There is very advanced knee degeneration.    Arterial calcifications are noted.    Upper tib-fib are intact.    Right ankle. 3 views.    There is a bimalleolar fracture with dislocation of the tibia medially on the talus. The fibular fracture is comminuted.    There is an inferior calcaneal spur.    Right foot. 3 views.    Bones of the foot are unremarkable.    IMPRESSION: Bimalleolar fracture with dislocation at the ankle. Advanced knee degeneration. Advanced arterial calcification.                ELIZA THOMPSON M.D., ATTENDING RADIOLOGIST  This document has been electronically signed. Feb 7 2020  3:07PM                < end of copied text >

## 2020-02-10 NOTE — DISCHARGE NOTE PROVIDER - CARE PROVIDER_API CALL
Torrey Olvera)  Critical Care Medicine; Internal Medicine; Pulmonary Disease  [address] [place] [address]  Phone: [phone]  Fax: [phone]  Follow Up Time:

## 2020-02-10 NOTE — DIETITIAN INITIAL EVALUATION ADULT. - ADD RECOMMEND
Current diet remains appropriate. Will remain available for nutrition education with family when present. Patient agreeable to written materials left at bedside for education re: consistent carbohydrate and hemodialysis diets. Good po intake/appetite, no need for oral nutritional supplement at this time. Consider Diabetes NP consult.

## 2020-02-10 NOTE — PROGRESS NOTE ADULT - SUBJECTIVE AND OBJECTIVE BOX
[facility] -- Glynn Bullock Grossman, [place], Morgan Sams Savella, [place]: Office # [phone]    Follow Up:  Postop/hx of CAD w/MI stent    Subjective/Observations: Patient awake and alert. Resting comfortably in bed. No complaints of chest pain, SOB, LE edema, cough. No signs of orthopnea or PND. C/o right leg pain.     REVIEW OF SYSTEMS: All review of systems is negative for eye, ENT, GI, , allergic, dermatologic, musculoskeletal and neurologic except as described above    PAST MEDICAL & SURGICAL HISTORY:  H/O diabetic neuropathy  PAF (paroxysmal atrial fibrillation): 1 episode in 11/2018 while hospitalized for acute cholecystitis  Chronic pancreatitis  Cholecystitis with cholangitis  Acute urinary retention  Liver abscess: resolved  ESRD (end stage renal disease) on dialysis: MWF since 05/2018  CAD (coronary artery disease): s/p stent x 1 in 2007  Diabetes: Type 2 DM  Myocardial infarction: 2007  Hypertension  Hypertension  Cataract: IOL b/l  S/P arteriovenous (AV) fistula creation: 05/17/2019  History of biliary stent insertion: 11/2018 &amp; removal 7/2/19  H/O: hysterectomy: 1990      MEDICATIONS  (STANDING):  aspirin enteric coated 81 milliGRAM(s) Oral daily  atorvastatin 80 milliGRAM(s) Oral at bedtime  chlorhexidine 2% Cloths 1 Application(s) Topical daily  dextrose 5%. 1000 milliLiter(s) (50 mL/Hr) IV Continuous <Continuous>  dextrose 50% Injectable 12.5 Gram(s) IV Push once  dextrose 50% Injectable 25 Gram(s) IV Push once  dextrose 50% Injectable 25 Gram(s) IV Push once  gabapentin 300 milliGRAM(s) Oral two times a day  heparin  Injectable 5000 Unit(s) SubCutaneous every 12 hours  insulin glargine Injectable (LANTUS) 12 Unit(s) SubCutaneous at bedtime  insulin lispro (HumaLOG) corrective regimen sliding scale   SubCutaneous three times a day before meals  insulin lispro (HumaLOG) corrective regimen sliding scale   SubCutaneous at bedtime  isosorbide   mononitrate ER Tablet (IMDUR) 60 milliGRAM(s) Oral daily  metoprolol tartrate 50 milliGRAM(s) Oral two times a day  multivitamin 1 Tablet(s) Oral daily  NIFEdipine XL 30 milliGRAM(s) Oral daily    MEDICATIONS  (PRN):  acetaminophen   Tablet .. 650 milliGRAM(s) Oral every 6 hours PRN Mild Pain (1 - 3)  dextrose 40% Gel 15 Gram(s) Oral once PRN Blood Glucose LESS THAN 70 milliGRAM(s)/deciLiter  glucagon  Injectable 1 milliGRAM(s) IntraMuscular once PRN Glucose <70 milliGRAM(s)/deciLiter      Allergies  ertapenem (Urticaria)  Purell (Rash)    Vital Signs Last 24 Hrs  T(C): 36.8 (10 Feb 2020 07:51), Max: 36.8 (10 Feb 2020 07:51)  T(F): 98.3 (10 Feb 2020 07:51), Max: 98.3 (10 Feb 2020 07:51)  HR: 73 (10 Feb 2020 11:13) (67 - 77)  BP: 121/75 (10 Feb 2020 11:13) (121/75 - 146/74)  BP(mean): --  RR: 18 (10 Feb 2020 07:51) (18 - 18)  SpO2: 98% (10 Feb 2020 07:51) (96% - 98%)    I&O's Summary    10 Feb 2020 07:01  -  10 Feb 2020 12:34  --------------------------------------------------------  IN: 250 mL / OUT: 300 mL / NET: -50 mL    TELE: not on tele   PHYSICAL EXAM:  Constitutional: NAD, awake and alert, well-developed  HEENT: Moist Mucous Membranes, Anicteric  Pulmonary: Non-labored, breath sounds are clear bilaterally, No wheezing, rales or rhonchi  Cardiovascular: Regular, S1 and S2, No murmurs, rubs, gallops or clicks  Gastrointestinal: Bowel Sounds present, soft, nontender.   Lymph: mild left LE edema. No lymphadenopathy.  Musculoskeletal: No cyanosis, No joint swelling, No joint pain   Skin: RLE with Acewrap/splint in place.  R ACW HD catheter in place No visible rashes or ulcers.  Psych:  Mood & affect appropriate    LABS: All Labs Reviewed:                        9.7    11.99 )-----------( 257      ( 10 Feb 2020 07:25 )             31.8                         10.3   11.55 )-----------( 254      ( 09 Feb 2020 13:16 )             33.2                         11.9   11.34 )-----------( 278      ( 07 Feb 2020 15:39 )             38.3     10 Feb 2020 07:14    137    |  101    |  64     ----------------------------<  234    5.0     |  25     |  6.10   09 Feb 2020 13:16    132    |  96     |  58     ----------------------------<  280    5.1     |  28     |  5.30   07 Feb 2020 15:39    135    |  96     |  17     ----------------------------<  279    3.8     |  33     |  2.90     Ca    8.3        10 Feb 2020 07:14  Ca    8.2        09 Feb 2020 13:16  Ca    8.7        07 Feb 2020 15:39    TPro  7.0    /  Alb  2.4    /  TBili  0.4    /  DBili  x      /  AST  12     /  ALT  13     /  AlkPhos  152    10 Feb 2020 07:14  TPro  8.1    /  Alb  2.9    /  TBili  0.4    /  DBili  x      /  AST  16     /  ALT  16     /  AlkPhos  195    07 Feb 2020 15:39    12 Lead ECG:   Ventricular Rate 74 BPM  Atrial Rate 74 BPM  P-R Interval 158 ms  QRS Duration 138 ms  Q-T Interval 446 ms  QTC Calculation(Bezet) 495 ms  P Axis 33 degrees  R Axis 94 degrees  T Axis -12 degrees  Diagnosis Line Normal sinus rhythm  Right bundle branch block  Inferior infarct (cited on or before 29-MAY-2019)  Confirmed by ARANZA SAMS ([id number]) on 2/8/2020 12:03:48 PM (02-07-20 @ 16:08)    ECHOCARDIOGRAM  < from: TTE Echo Doppler w/o Cont (05.04.18 @ 20:56) >  Aortic root 2.3 cm left atrium 4.4 cm left ventricular end-diastolic  diameter 5.7 cm left ventricular end-systolic diameter 4.3 cm septal wall  thickness 1.2 cm posterior wall thickness 1.2 cm visually equivocally  estimated ejection fraction 50-55%.    Conclusion:  1. Technically difficult limited supine study. Please note that this is a  portable study and the study is also limited due to patient's body  habitus.  2. Possible fibrocalcific disease of the aortic and mitral valves without  severe stenosis as described above.  3. Enlarged left atrium with associated mild mitral regurgitation.  4. Very limited visualization left ventricle which may represent mild  left ventricular concentric hypertrophy with a well-preserved ejection  fraction as described above.  5. A very small posterior pericardial effusion is suggested but not  diagnostic.  6. Correlate these limited findings clinically.

## 2020-02-10 NOTE — DIETITIAN INITIAL EVALUATION ADULT. - OTHER INFO
70 year old female with PMH of type 2 DM, ESRD on HD (since 2018), HTN, CAD admitted for mechanical fall on R ankle. Being managed without orthopedic intervention.    Pt alert, Roberto speaking. Refused  phone at this time and no family at bedside. Amenable to written materials left at bedside for family to review with her. Weight per chart 173 pounds, c/w visual observation. Per chart review, takes 8 units lantus PM and 6 units AM. HgBa1c 9.4% indicates suboptimal glycemic control.     No noted N/V/diarrhea/constipation per EMR. Last BM 2/8. Observing consuming lunch with good appetite/intake, %.

## 2020-02-10 NOTE — DIETITIAN INITIAL EVALUATION ADULT. - PERTINENT LABORATORY DATA
HgbA1c 9.4%, Hgb 9.7L, Hct 31.8L, POCT -205 mg/dl (2/10), 201-274 mg/dl (2/9), 234-351 mg/dl (2/8), BUN 64H, Cr 6.10H, Glu 234H, GFR 6L

## 2020-02-10 NOTE — GOALS OF CARE CONVERSATION - ADVANCED CARE PLANNING - CONVERSATION DETAILS
Received referral for advanced care planning.Spoke to son Shen Black [phone] and was instructed to leave a health care proxy at the bedside.Will follow up with family for completion.

## 2020-02-10 NOTE — DIETITIAN INITIAL EVALUATION ADULT. - ENERGY NEEDS
Wt: 173 pounds, Ht: 65 inches (from previous RD assessment in 2018, ht of 61 inches per header appears to be inaccurate), BMI: 32.6 kg/m2 IBW: 125 pounds  +/-10%, %IBW: 138.4%

## 2020-02-10 NOTE — DISCHARGE NOTE PROVIDER - NSDCCPCAREPLAN_GEN_ALL_CORE_FT
PRINCIPAL DISCHARGE DIAGNOSIS  Diagnosis: Ankle fracture, bimalleolar, closed, right, initial encounter  Assessment and Plan of Treatment: s/p splint reduction, PT pain meds      SECONDARY DISCHARGE DIAGNOSES  Diagnosis: ESRD on dialysis  Assessment and Plan of Treatment: HD as pre renal    Diagnosis: Diabetes mellitus type 2, uncontrolled  Assessment and Plan of Treatment: Diabetes mellitus type 2, uncontrolled Endo consulted.

## 2020-02-11 ENCOUNTER — TRANSCRIPTION ENCOUNTER (OUTPATIENT)
Age: 70
End: 2020-02-11

## 2020-02-11 DIAGNOSIS — M25.579 PAIN IN UNSPECIFIED ANKLE AND JOINTS OF UNSPECIFIED FOOT: ICD-10-CM

## 2020-02-11 LAB
HBV CORE AB SER-ACNC: SIGNIFICANT CHANGE UP
HBV SURFACE AB SER-ACNC: 3.6 MIU/ML — LOW
HBV SURFACE AG SER-ACNC: SIGNIFICANT CHANGE UP
HCV AB S/CO SERPL IA: 0.17 S/CO — SIGNIFICANT CHANGE UP (ref 0–0.99)
HCV AB SERPL-IMP: SIGNIFICANT CHANGE UP

## 2020-02-11 PROCEDURE — 99232 SBSQ HOSP IP/OBS MODERATE 35: CPT

## 2020-02-11 RX ORDER — INSULIN LISPRO 100/ML
3 VIAL (ML) SUBCUTANEOUS
Refills: 0 | Status: DISCONTINUED | OUTPATIENT
Start: 2020-02-11 | End: 2020-02-13

## 2020-02-11 RX ORDER — LANOLIN ALCOHOL/MO/W.PET/CERES
3 CREAM (GRAM) TOPICAL AT BEDTIME
Refills: 0 | Status: DISCONTINUED | OUTPATIENT
Start: 2020-02-11 | End: 2020-02-13

## 2020-02-11 RX ORDER — TRAMADOL HYDROCHLORIDE 50 MG/1
25 TABLET ORAL
Refills: 0 | Status: DISCONTINUED | OUTPATIENT
Start: 2020-02-11 | End: 2020-02-13

## 2020-02-11 RX ORDER — HEPARIN SODIUM 5000 [USP'U]/ML
5000 INJECTION INTRAVENOUS; SUBCUTANEOUS EVERY 8 HOURS
Refills: 0 | Status: DISCONTINUED | OUTPATIENT
Start: 2020-02-11 | End: 2020-02-13

## 2020-02-11 RX ADMIN — Medication 50 MILLIGRAM(S): at 18:36

## 2020-02-11 RX ADMIN — Medication 3 UNIT(S): at 17:06

## 2020-02-11 RX ADMIN — Medication 2: at 08:03

## 2020-02-11 RX ADMIN — Medication 650 MILLIGRAM(S): at 15:50

## 2020-02-11 RX ADMIN — INSULIN GLARGINE 12 UNIT(S): 100 INJECTION, SOLUTION SUBCUTANEOUS at 21:53

## 2020-02-11 RX ADMIN — Medication 650 MILLIGRAM(S): at 22:50

## 2020-02-11 RX ADMIN — ATORVASTATIN CALCIUM 80 MILLIGRAM(S): 80 TABLET, FILM COATED ORAL at 21:52

## 2020-02-11 RX ADMIN — Medication 4: at 17:06

## 2020-02-11 RX ADMIN — Medication 650 MILLIGRAM(S): at 09:02

## 2020-02-11 RX ADMIN — HEPARIN SODIUM 5000 UNIT(S): 5000 INJECTION INTRAVENOUS; SUBCUTANEOUS at 14:49

## 2020-02-11 RX ADMIN — Medication 3 UNIT(S): at 11:57

## 2020-02-11 RX ADMIN — Medication 81 MILLIGRAM(S): at 11:58

## 2020-02-11 RX ADMIN — Medication 650 MILLIGRAM(S): at 10:02

## 2020-02-11 RX ADMIN — Medication 3 MILLIGRAM(S): at 21:52

## 2020-02-11 RX ADMIN — CHLORHEXIDINE GLUCONATE 1 APPLICATION(S): 213 SOLUTION TOPICAL at 11:56

## 2020-02-11 RX ADMIN — Medication 650 MILLIGRAM(S): at 21:53

## 2020-02-11 RX ADMIN — HEPARIN SODIUM 5000 UNIT(S): 5000 INJECTION INTRAVENOUS; SUBCUTANEOUS at 21:52

## 2020-02-11 RX ADMIN — Medication 30 MILLIGRAM(S): at 05:27

## 2020-02-11 RX ADMIN — Medication 2: at 11:57

## 2020-02-11 RX ADMIN — GABAPENTIN 300 MILLIGRAM(S): 400 CAPSULE ORAL at 18:36

## 2020-02-11 RX ADMIN — HEPARIN SODIUM 5000 UNIT(S): 5000 INJECTION INTRAVENOUS; SUBCUTANEOUS at 05:27

## 2020-02-11 RX ADMIN — Medication 650 MILLIGRAM(S): at 14:50

## 2020-02-11 RX ADMIN — Medication 1 TABLET(S): at 11:58

## 2020-02-11 RX ADMIN — ISOSORBIDE MONONITRATE 60 MILLIGRAM(S): 60 TABLET, EXTENDED RELEASE ORAL at 11:58

## 2020-02-11 RX ADMIN — Medication 50 MILLIGRAM(S): at 05:27

## 2020-02-11 RX ADMIN — GABAPENTIN 300 MILLIGRAM(S): 400 CAPSULE ORAL at 05:27

## 2020-02-11 NOTE — PROGRESS NOTE ADULT - SUBJECTIVE AND OBJECTIVE BOX
CAPILLARY BLOOD GLUCOSE      POCT Blood Glucose.: 253 mg/dL (10 Feb 2020 21:10)  POCT Blood Glucose.: 159 mg/dL (10 Feb 2020 17:11)  POCT Blood Glucose.: 205 mg/dL (10 Feb 2020 11:29)      Vital Signs Last 24 Hrs  T(C): 36.9 (10 Feb 2020 23:59), Max: 36.9 (10 Feb 2020 19:41)  T(F): 98.5 (10 Feb 2020 23:59), Max: 98.5 (10 Feb 2020 23:59)  HR: 74 (11 Feb 2020 05:24) (67 - 83)  BP: 150/79 (11 Feb 2020 05:24) (121/75 - 150/79)  BP(mean): --  RR: 15 (11 Feb 2020 05:24) (15 - 20)  SpO2: 97% (11 Feb 2020 05:24) (97% - 99%)    General: WN/WD NAD  Respiratory: CTA B/L  CV: RRR, S1S2, no murmurs, rubs or gallops  Abdominal: Soft, NT, ND +BS, Last BM  Extremities: No edema, + peripheral pulses     02-10    137  |  101  |  64<H>  ----------------------------<  234<H>  5.0   |  25  |  6.10<H>    Ca    8.3<L>      10 Feb 2020 07:14    TPro  7.0  /  Alb  2.4<L>  /  TBili  0.4  /  DBili  x   /  AST  12<L>  /  ALT  13  /  AlkPhos  152<H>  02-10      atorvastatin 80 milliGRAM(s) Oral at bedtime  dextrose 40% Gel 15 Gram(s) Oral once PRN  dextrose 50% Injectable 12.5 Gram(s) IV Push once  dextrose 50% Injectable 25 Gram(s) IV Push once  dextrose 50% Injectable 25 Gram(s) IV Push once  glucagon  Injectable 1 milliGRAM(s) IntraMuscular once PRN  insulin glargine Injectable (LANTUS) 12 Unit(s) SubCutaneous at bedtime  insulin lispro (HumaLOG) corrective regimen sliding scale   SubCutaneous three times a day before meals  insulin lispro (HumaLOG) corrective regimen sliding scale   SubCutaneous at bedtime

## 2020-02-11 NOTE — DISCHARGE NOTE NURSING/CASE MANAGEMENT/SOCIAL WORK - PATIENT PORTAL LINK FT
You can access the FollowMyHealth Patient Portal offered by [facility] by registering at the following website: [email or web address]. By joining [employer]’s FollowMyHealth portal, you will also be able to view your health information using other applications (apps) compatible with our system.

## 2020-02-11 NOTE — PROGRESS NOTE ADULT - SUBJECTIVE AND OBJECTIVE BOX
CLAUDIA IRENE  70y  Female    Patient is a 70y old  Female who presents with a chief complaint of fall (11 Feb 2020 09:57)    comfortable.     PAST MEDICAL & SURGICAL HISTORY:  H/O diabetic neuropathy  PAF (paroxysmal atrial fibrillation): 1 episode in 11/2018 while hospitalized for acute cholecystitis  Chronic pancreatitis  Cholecystitis with cholangitis  Acute urinary retention  Liver abscess: resolved  ESRD (end stage renal disease) on dialysis: MWF since 05/2018  CAD (coronary artery disease): s/p stent x 1 in 2007  Diabetes: Type 2 DM  Myocardial infarction: 2007  Hypertension  Cataract: IOL b/l  S/P arteriovenous (AV) fistula creation: 05/17/2019  History of biliary stent insertion: 11/2018 &amp; removal 7/2/19  H/O: hysterectomy: 1990          PHYSICAL EXAM:    T(C): 36.7 (02-11-20 @ 08:01), Max: 36.9 (02-10-20 @ 19:41)  HR: 70 (02-11-20 @ 08:01) (70 - 83)  BP: 165/80 (02-11-20 @ 08:01) (128/64 - 165/80)  RR: 19 (02-11-20 @ 08:01) (15 - 20)  SpO2: 98% (02-11-20 @ 08:01) (97% - 99%)  Wt(kg): --    I&O's Detail    10 Feb 2020 07:01  -  11 Feb 2020 07:00  --------------------------------------------------------  IN:    Oral Fluid: 250 mL  Total IN: 250 mL    OUT:    Other: 2000 mL    Voided: 300 mL  Total OUT: 2300 mL    Total NET: -2050 mL          Respiratory: clear anteriorly, decreased BS at bases  Cardiovascular: S1 S2  Gastrointestinal: soft NT ND +BS  Extremities: trace edema   Neuro: Awake and alert    MEDICATIONS  (STANDING):  aspirin enteric coated 81 milliGRAM(s) Oral daily  atorvastatin 80 milliGRAM(s) Oral at bedtime  chlorhexidine 2% Cloths 1 Application(s) Topical daily  dextrose 5%. 1000 milliLiter(s) (50 mL/Hr) IV Continuous <Continuous>  dextrose 50% Injectable 12.5 Gram(s) IV Push once  dextrose 50% Injectable 25 Gram(s) IV Push once  dextrose 50% Injectable 25 Gram(s) IV Push once  epoetin analilia Injectable 84916 Unit(s) IV Push <User Schedule>  gabapentin 300 milliGRAM(s) Oral two times a day  heparin  Injectable 5000 Unit(s) SubCutaneous every 8 hours  insulin glargine Injectable (LANTUS) 12 Unit(s) SubCutaneous at bedtime  insulin lispro (HumaLOG) corrective regimen sliding scale   SubCutaneous three times a day before meals  insulin lispro (HumaLOG) corrective regimen sliding scale   SubCutaneous at bedtime  insulin lispro Injectable (HumaLOG) 3 Unit(s) SubCutaneous three times a day before meals  isosorbide   mononitrate ER Tablet (IMDUR) 60 milliGRAM(s) Oral daily  melatonin 3 milliGRAM(s) Oral at bedtime  metoprolol tartrate 50 milliGRAM(s) Oral two times a day  multivitamin 1 Tablet(s) Oral daily  NIFEdipine XL 30 milliGRAM(s) Oral daily    MEDICATIONS  (PRN):  acetaminophen   Tablet .. 650 milliGRAM(s) Oral every 6 hours PRN Mild Pain (1 - 3)  dextrose 40% Gel 15 Gram(s) Oral once PRN Blood Glucose LESS THAN 70 milliGRAM(s)/deciLiter  glucagon  Injectable 1 milliGRAM(s) IntraMuscular once PRN Glucose <70 milliGRAM(s)/deciLiter  traMADol 25 milliGRAM(s) Oral four times a day PRN Moderate Pain (4 - 6)                            9.7    11.99 )-----------( 257      ( 10 Feb 2020 07:25 )             31.8       02-10    137  |  101  |  64<H>  ----------------------------<  234<H>  5.0   |  25  |  6.10<H>    Ca    8.3<L>      10 Feb 2020 07:14    TPro  7.0  /  Alb  2.4<L>  /  TBili  0.4  /  DBili  x   /  AST  12<L>  /  ALT  13  /  AlkPhos  152<H>  02-10      Creatinine Trend: Creatinine Trend: 6.10<--, 5.30<--, 2.90<--

## 2020-02-11 NOTE — PROGRESS NOTE ADULT - ASSESSMENT
70 year olf female PMH HTN, DM, MI s/p stent x1 in 2007, ESRD on HD (MWF), diabetic neuropathy being admitted with right ankle fracture sp closed reduction and splinting     Right ankle fracture  - s/p R ankle Jethro fracture s/p closed reduction and splinting tolerated procedure well  - non-operative management no plans for surgical intervention at this time as per ortho recs  - Pain control  - Ortho following    CAD  - continue atorvastatin 80 mg daily  - continue aspirin 81 mg  - continue metoprolol tartrate 50 mg BID  - continue Imdur 60 mg daily  - Previous ECHO in 5/18 showed EF 50-55 percent with no significant valvular abnormalities.    HTN  - continue Imdur, Nifedipine and metoprolol  - Monitor routine hemodynamics    ESRD on HD  - Volume removal via HD as per Renal    - monitor and replete lytes, keep K>4, Mg>2.      DVT prophylaxis  - Heparin SQ as per primary      - Monitor and replete lytes, keep K>4, Mg>2.  - All other medical needs as per primary team.  - Other cardiovascular workup will depend on clinical course.  - Will continue to follow.      Samra Diego FNP-C  Cardiology NP  [phone]

## 2020-02-11 NOTE — PROGRESS NOTE ADULT - SUBJECTIVE AND OBJECTIVE BOX
Patient is a 70y old  Female who presents with a chief complaint of fall (11 Feb 2020 14:58)      INTERVAL /OVERNIGHT EVENTS: c/o ankle pain    MEDICATIONS  (STANDING):  aspirin enteric coated 81 milliGRAM(s) Oral daily  atorvastatin 80 milliGRAM(s) Oral at bedtime  chlorhexidine 2% Cloths 1 Application(s) Topical daily  dextrose 5%. 1000 milliLiter(s) (50 mL/Hr) IV Continuous <Continuous>  dextrose 50% Injectable 12.5 Gram(s) IV Push once  dextrose 50% Injectable 25 Gram(s) IV Push once  dextrose 50% Injectable 25 Gram(s) IV Push once  epoetin analilia Injectable 98934 Unit(s) IV Push <User Schedule>  gabapentin 300 milliGRAM(s) Oral two times a day  heparin  Injectable 5000 Unit(s) SubCutaneous every 8 hours  insulin glargine Injectable (LANTUS) 12 Unit(s) SubCutaneous at bedtime  insulin lispro (HumaLOG) corrective regimen sliding scale   SubCutaneous three times a day before meals  insulin lispro (HumaLOG) corrective regimen sliding scale   SubCutaneous at bedtime  insulin lispro Injectable (HumaLOG) 3 Unit(s) SubCutaneous three times a day before meals  isosorbide   mononitrate ER Tablet (IMDUR) 60 milliGRAM(s) Oral daily  melatonin 3 milliGRAM(s) Oral at bedtime  metoprolol tartrate 50 milliGRAM(s) Oral two times a day  multivitamin 1 Tablet(s) Oral daily  NIFEdipine XL 30 milliGRAM(s) Oral daily    MEDICATIONS  (PRN):  acetaminophen   Tablet .. 650 milliGRAM(s) Oral every 6 hours PRN Mild Pain (1 - 3)  dextrose 40% Gel 15 Gram(s) Oral once PRN Blood Glucose LESS THAN 70 milliGRAM(s)/deciLiter  glucagon  Injectable 1 milliGRAM(s) IntraMuscular once PRN Glucose <70 milliGRAM(s)/deciLiter  traMADol 25 milliGRAM(s) Oral four times a day PRN Moderate Pain (4 - 6)      Allergies    ertapenem (Urticaria)  Purell (Rash)    Intolerances        REVIEW OF SYSTEMS:  CONSTITUTIONAL: No fever, weight loss, or fatigue  EYES: No eye pain, visual disturbances, or discharge  ENMT:  No difficulty hearing, tinnitus, vertigo; No sinus or throat pain  NECK: No pain or stiffness  RESPIRATORY: No cough, wheezing, chills or hemoptysis; No shortness of breath  CARDIOVASCULAR: No chest pain, palpitations, dizziness, or leg swelling  GASTROINTESTINAL: No abdominal or epigastric pain. No nausea, vomiting, or hematemesis; No diarrhea or constipation. No melena or hematochezia.  GENITOURINARY: No dysuria, frequency, hematuria, or incontinence  NEUROLOGICAL: No headaches, memory loss, loss of strength, numbness, or tremors  SKIN: No itching, burning, rashes, or lesions   LYMPH NODES: No enlarged glands  ENDOCRINE: No heat or cold intolerance; No hair loss; No polydipsia or polyuria  MUSCULOSKELETAL: No joint pain or swelling; No muscle, back, or extremity pain  PSYCHIATRIC: No depression, anxiety, mood swings, or difficulty sleeping  HEME/LYMPH: No easy bruising, or bleeding gums  ALLERGY AND IMMUNOLOGIC: No hives or eczema    Vital Signs Last 24 Hrs  T(C): 36.7 (11 Feb 2020 08:01), Max: 36.9 (10 Feb 2020 19:41)  T(F): 98 (11 Feb 2020 08:01), Max: 98.5 (10 Feb 2020 23:59)  HR: 70 (11 Feb 2020 08:01) (70 - 83)  BP: 165/80 (11 Feb 2020 08:01) (128/64 - 165/80)  BP(mean): --  RR: 19 (11 Feb 2020 08:01) (15 - 20)  SpO2: 98% (11 Feb 2020 08:01) (97% - 99%)    PHYSICAL EXAM:  GENERAL: NAD, well-groomed, well-developed  HEAD:  Atraumatic, Normocephalic  EYES: EOMI, PERRLA, conjunctiva and sclera clear  ENMT: No tonsillar erythema, exudates, or enlargement; Moist mucous membranes, Good dentition, No lesions  NECK: Supple, No JVD, Normal thyroid  NERVOUS SYSTEM:  Alert & Oriented X3, Good concentration; Motor Strength 5/5 B/L upper and lower extremities; DTRs 2+ intact and symmetric  CHEST/LUNG: Clear to auscultation bilaterally; No rales, rhonchi, wheezing, or rubs  HEART: Regular rate and rhythm; No murmurs, rubs, or gallops  ABDOMEN: Soft, Nontender, Nondistended; Bowel sounds present  EXTREMITIES:  2+ Peripheral Pulses, No clubbing, cyanosis, or edema  LYMPH: No lymphadenopathy noted  SKIN: No rashes or lesions    LABS:      Ca    8.3        10 Feb 2020 07:14          CAPILLARY BLOOD GLUCOSE      POCT Blood Glucose.: 169 mg/dL (11 Feb 2020 11:27)  POCT Blood Glucose.: 178 mg/dL (11 Feb 2020 07:50)  POCT Blood Glucose.: 253 mg/dL (10 Feb 2020 21:10)  POCT Blood Glucose.: 159 mg/dL (10 Feb 2020 17:11)      RADIOLOGY & ADDITIONAL TESTS:    Notes Reviewed:  [x ] YES  [ ] NO    Care Discussed with Consultants/Other Providers [x ] YES  [ ] NO

## 2020-02-11 NOTE — PROGRESS NOTE ADULT - SUBJECTIVE AND OBJECTIVE BOX
[facility] -- Glynn Bullock, Claudia Davis Pannella, Patel, Savella  Office # [phone]      Follow Up:    CAD    Subjective/Observations:   No events overnight resting comfortably in bed.  No complaints of chest pain, dyspnea, or palpitations reported. No signs of orthopnea or PND.  + Right ankle pain     REVIEW OF SYSTEMS: All other review of systems is negative unless indicated above    PAST MEDICAL & SURGICAL HISTORY:  H/O diabetic neuropathy  PAF (paroxysmal atrial fibrillation): 1 episode in 11/2018 while hospitalized for acute cholecystitis  Chronic pancreatitis  Cholecystitis with cholangitis  Acute urinary retention  Liver abscess: resolved  ESRD (end stage renal disease) on dialysis: MWF since 05/2018  CAD (coronary artery disease): s/p stent x 1 in 2007  Diabetes: Type 2 DM  Myocardial infarction: 2007  Hypertension  Cataract: IOL b/l  S/P arteriovenous (AV) fistula creation: 05/17/2019  History of biliary stent insertion: 11/2018 &amp; removal 7/2/19  H/O: hysterectomy: 1990      MEDICATIONS  (STANDING):  aspirin enteric coated 81 milliGRAM(s) Oral daily  atorvastatin 80 milliGRAM(s) Oral at bedtime  chlorhexidine 2% Cloths 1 Application(s) Topical daily  dextrose 5%. 1000 milliLiter(s) (50 mL/Hr) IV Continuous <Continuous>  dextrose 50% Injectable 12.5 Gram(s) IV Push once  dextrose 50% Injectable 25 Gram(s) IV Push once  dextrose 50% Injectable 25 Gram(s) IV Push once  epoetin analilia Injectable 74011 Unit(s) IV Push <User Schedule>  gabapentin 300 milliGRAM(s) Oral two times a day  heparin  Injectable 5000 Unit(s) SubCutaneous every 12 hours  insulin glargine Injectable (LANTUS) 12 Unit(s) SubCutaneous at bedtime  insulin lispro (HumaLOG) corrective regimen sliding scale   SubCutaneous three times a day before meals  insulin lispro (HumaLOG) corrective regimen sliding scale   SubCutaneous at bedtime  insulin lispro Injectable (HumaLOG) 3 Unit(s) SubCutaneous three times a day before meals  isosorbide   mononitrate ER Tablet (IMDUR) 60 milliGRAM(s) Oral daily  metoprolol tartrate 50 milliGRAM(s) Oral two times a day  multivitamin 1 Tablet(s) Oral daily  NIFEdipine XL 30 milliGRAM(s) Oral daily    MEDICATIONS  (PRN):  acetaminophen   Tablet .. 650 milliGRAM(s) Oral every 6 hours PRN Mild Pain (1 - 3)  dextrose 40% Gel 15 Gram(s) Oral once PRN Blood Glucose LESS THAN 70 milliGRAM(s)/deciLiter  glucagon  Injectable 1 milliGRAM(s) IntraMuscular once PRN Glucose <70 milliGRAM(s)/deciLiter      Allergies    ertapenem (Urticaria)  Purell (Rash)    Intolerances        Vital Signs Last 24 Hrs  T(C): 36.7 (11 Feb 2020 08:01), Max: 36.9 (10 Feb 2020 19:41)  T(F): 98 (11 Feb 2020 08:01), Max: 98.5 (10 Feb 2020 23:59)  HR: 70 (11 Feb 2020 08:01) (70 - 83)  BP: 165/80 (11 Feb 2020 08:01) (121/75 - 165/80)  BP(mean): --  RR: 19 (11 Feb 2020 08:01) (15 - 20)  SpO2: 98% (11 Feb 2020 08:01) (97% - 99%)    I&O's Summary    10 Feb 2020 07:01  -  11 Feb 2020 07:00  --------------------------------------------------------  IN: 250 mL / OUT: 2300 mL / NET: -2050 mL          PHYSICAL EXAM:  TELE: not on tele   Constitutional: NAD, awake and alert, well-developed  HEENT: Moist Mucous Membranes, Anicteric  Pulmonary: Non-labored, breath sounds are clear bilaterally, No wheezing, crackles or rhonchi, RIGHT ACW with port   Cardiovascular: Regular, S1 and S2 nl, No murmurs, rubs, gallops or clicks  Gastrointestinal: Bowel Sounds present, soft, nontender.   Lymph: RIGHT ankle with dressing in place, elevated on pillow   Skin: No visible rashes or ulcers.  Psych:  Mood & affect appropriate    LABS: All Labs Reviewed:                        9.7    11.99 )-----------( 257      ( 10 Feb 2020 07:25 )             31.8                         10.3   11.55 )-----------( 254      ( 09 Feb 2020 13:16 )             33.2     10 Feb 2020 07:14    137    |  101    |  64     ----------------------------<  234    5.0     |  25     |  6.10   09 Feb 2020 13:16    132    |  96     |  58     ----------------------------<  280    5.1     |  28     |  5.30     Ca    8.3        10 Feb 2020 07:14  Ca    8.2        09 Feb 2020 13:16    TPro  7.0    /  Alb  2.4    /  TBili  0.4    /  DBili  x      /  AST  12     /  ALT  13     /  AlkPhos  152    10 Feb 2020 07:14             ECG:  < from: 12 Lead ECG (02.07.20 @ 16:08) >    Ventricular Rate 74 BPM    Atrial Rate 74 BPM    P-R Interval 158 ms    QRS Duration 138 ms    Q-T Interval 446 ms    QTC Calculation(Bezet) 495 ms    P Axis 33 degrees    R Axis 94 degrees    T Axis -12 degrees    Diagnosis Line Normal sinus rhythm  Right bundle branch block  Inferior infarct (cited on or before 29-MAY-2019)    < end of copied text >      Echo:  < from: TTE Echo Doppler w/o Cont (05.04.18 @ 20:56) >    Conclusion:  1. Technically difficult limited supine study. Please note that this is a   portable study and the study is also limited due to patient's body   habitus.  2. 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  3. Enlarged left atrium with associated mild mitral regurgitation.  4. Very limited visualization left ventricle which may represent mild   left ventricular concentric hypertrophy with a well-preserved ejection   fraction as described above.  5. A very small posterior pericardial effusion is suggested but not   diagnostic.  6. Correlate these limited findings clinically.    < end of copied text >      Radiology:      < from: Xray Chest 1 View- PORTABLE-Urgent (05.29.19 @ 15:25) >  FINDINGS:    LINES/TUBES: Unchanged right-sided double-lumen central venous catheter   with tip overlying the right atrium.  LUNGS/PLEURA: Bilateral perihilar opacities. No significant pleural   effusion or pneumothorax.  MEDIASTINUM: Cardiac silhouette is enlarged.  OTHER: None.    IMPRESSION:     Pulmonary vascular congestion.    < end of copied text >

## 2020-02-11 NOTE — PROGRESS NOTE ADULT - ASSESSMENT
69 yo F with PMH of ESRD on HD (M, W, F), CAD s/p stents 2007, DM, HTN, MI, biliary stent, Liver abscess who presents to the ED with a history of fall and found to have fracture of the right ankle. Continue the pan medications and recs as per ortho. will schedule for dialysis for tomorrow.  spoke to patient's dtr at bed side.

## 2020-02-12 LAB
ALBUMIN SERPL ELPH-MCNC: 2.4 G/DL — LOW (ref 3.3–5)
ALP SERPL-CCNC: 157 U/L — HIGH (ref 40–120)
ALT FLD-CCNC: 16 U/L — SIGNIFICANT CHANGE UP (ref 12–78)
ANION GAP SERPL CALC-SCNC: 9 MMOL/L — SIGNIFICANT CHANGE UP (ref 5–17)
AST SERPL-CCNC: 15 U/L — SIGNIFICANT CHANGE UP (ref 15–37)
BILIRUB SERPL-MCNC: 0.5 MG/DL — SIGNIFICANT CHANGE UP (ref 0.2–1.2)
BUN SERPL-MCNC: 56 MG/DL — HIGH (ref 7–23)
CALCIUM SERPL-MCNC: 9 MG/DL — SIGNIFICANT CHANGE UP (ref 8.5–10.1)
CHLORIDE SERPL-SCNC: 100 MMOL/L — SIGNIFICANT CHANGE UP (ref 96–108)
CO2 SERPL-SCNC: 26 MMOL/L — SIGNIFICANT CHANGE UP (ref 22–31)
CREAT SERPL-MCNC: 5 MG/DL — HIGH (ref 0.5–1.3)
GLUCOSE SERPL-MCNC: 145 MG/DL — HIGH (ref 70–99)
HCT VFR BLD CALC: 32.8 % — LOW (ref 34.5–45)
HGB BLD-MCNC: 9.8 G/DL — LOW (ref 11.5–15.5)
MCHC RBC-ENTMCNC: 28.7 PG — SIGNIFICANT CHANGE UP (ref 27–34)
MCHC RBC-ENTMCNC: 29.9 GM/DL — LOW (ref 32–36)
MCV RBC AUTO: 95.9 FL — SIGNIFICANT CHANGE UP (ref 80–100)
NRBC # BLD: 0 /100 WBCS — SIGNIFICANT CHANGE UP (ref 0–0)
PLATELET # BLD AUTO: 267 K/UL — SIGNIFICANT CHANGE UP (ref 150–400)
POTASSIUM SERPL-MCNC: 4.8 MMOL/L — SIGNIFICANT CHANGE UP (ref 3.5–5.3)
POTASSIUM SERPL-SCNC: 4.8 MMOL/L — SIGNIFICANT CHANGE UP (ref 3.5–5.3)
PROT SERPL-MCNC: 7.5 G/DL — SIGNIFICANT CHANGE UP (ref 6–8.3)
RBC # BLD: 3.42 M/UL — LOW (ref 3.8–5.2)
RBC # FLD: 15 % — HIGH (ref 10.3–14.5)
SODIUM SERPL-SCNC: 135 MMOL/L — SIGNIFICANT CHANGE UP (ref 135–145)
WBC # BLD: 11.76 K/UL — HIGH (ref 3.8–10.5)
WBC # FLD AUTO: 11.76 K/UL — HIGH (ref 3.8–10.5)

## 2020-02-12 PROCEDURE — 99232 SBSQ HOSP IP/OBS MODERATE 35: CPT

## 2020-02-12 RX ORDER — ERYTHROPOIETIN 10000 [IU]/ML
10000 INJECTION, SOLUTION INTRAVENOUS; SUBCUTANEOUS
Refills: 0 | Status: DISCONTINUED | OUTPATIENT
Start: 2020-02-12 | End: 2020-02-13

## 2020-02-12 RX ADMIN — HEPARIN SODIUM 5000 UNIT(S): 5000 INJECTION INTRAVENOUS; SUBCUTANEOUS at 15:07

## 2020-02-12 RX ADMIN — INSULIN GLARGINE 12 UNIT(S): 100 INJECTION, SOLUTION SUBCUTANEOUS at 21:26

## 2020-02-12 RX ADMIN — Medication 650 MILLIGRAM(S): at 13:40

## 2020-02-12 RX ADMIN — CHLORHEXIDINE GLUCONATE 1 APPLICATION(S): 213 SOLUTION TOPICAL at 13:57

## 2020-02-12 RX ADMIN — Medication 650 MILLIGRAM(S): at 14:40

## 2020-02-12 RX ADMIN — ERYTHROPOIETIN 10000 UNIT(S): 10000 INJECTION, SOLUTION INTRAVENOUS; SUBCUTANEOUS at 12:43

## 2020-02-12 RX ADMIN — HEPARIN SODIUM 5000 UNIT(S): 5000 INJECTION INTRAVENOUS; SUBCUTANEOUS at 05:56

## 2020-02-12 RX ADMIN — Medication 650 MILLIGRAM(S): at 21:21

## 2020-02-12 RX ADMIN — Medication 3 UNIT(S): at 07:44

## 2020-02-12 RX ADMIN — Medication 2: at 21:24

## 2020-02-12 RX ADMIN — GABAPENTIN 300 MILLIGRAM(S): 400 CAPSULE ORAL at 05:56

## 2020-02-12 RX ADMIN — Medication 1 TABLET(S): at 15:08

## 2020-02-12 RX ADMIN — Medication 650 MILLIGRAM(S): at 08:39

## 2020-02-12 RX ADMIN — ATORVASTATIN CALCIUM 80 MILLIGRAM(S): 80 TABLET, FILM COATED ORAL at 21:22

## 2020-02-12 RX ADMIN — Medication 3 UNIT(S): at 12:25

## 2020-02-12 RX ADMIN — HEPARIN SODIUM 5000 UNIT(S): 5000 INJECTION INTRAVENOUS; SUBCUTANEOUS at 21:24

## 2020-02-12 RX ADMIN — GABAPENTIN 300 MILLIGRAM(S): 400 CAPSULE ORAL at 18:17

## 2020-02-12 RX ADMIN — Medication 3 MILLIGRAM(S): at 21:34

## 2020-02-12 RX ADMIN — Medication 3 UNIT(S): at 17:04

## 2020-02-12 RX ADMIN — ISOSORBIDE MONONITRATE 60 MILLIGRAM(S): 60 TABLET, EXTENDED RELEASE ORAL at 15:08

## 2020-02-12 RX ADMIN — Medication 650 MILLIGRAM(S): at 07:39

## 2020-02-12 RX ADMIN — Medication 81 MILLIGRAM(S): at 15:08

## 2020-02-12 RX ADMIN — Medication 650 MILLIGRAM(S): at 22:30

## 2020-02-12 NOTE — PROGRESS NOTE ADULT - SUBJECTIVE AND OBJECTIVE BOX
CAPILLARY BLOOD GLUCOSE      POCT Blood Glucose.: 130 mg/dL (12 Feb 2020 11:51)  POCT Blood Glucose.: 286 mg/dL (12 Feb 2020 09:05)  POCT Blood Glucose.: 222 mg/dL (12 Feb 2020 07:35)  POCT Blood Glucose.: 182 mg/dL (11 Feb 2020 21:41)  POCT Blood Glucose.: 223 mg/dL (11 Feb 2020 16:36)      Vital Signs Last 24 Hrs  T(C): 36.7 (12 Feb 2020 09:16), Max: 36.8 (12 Feb 2020 07:39)  T(F): 98 (12 Feb 2020 09:16), Max: 98.3 (12 Feb 2020 07:39)  HR: 78 (12 Feb 2020 09:16) (71 - 78)  BP: 141/59 (12 Feb 2020 09:16) (104/64 - 149/72)  BP(mean): --  RR: 20 (12 Feb 2020 09:16) (18 - 20)  SpO2: 98% (12 Feb 2020 09:16) (95% - 99%)    General: WN/WD NAD  Respiratory: CTA B/L  CV: RRR, S1S2, no murmurs, rubs or gallops  Abdominal: Soft, NT, ND +BS, Last BM  Extremities: No edema, + peripheral pulses     02-12    135  |  100  |  56<H>  ----------------------------<  145<H>  4.8   |  26  |  5.00<H>    Ca    9.0      12 Feb 2020 06:48    TPro  7.5  /  Alb  2.4<L>  /  TBili  0.5  /  DBili  x   /  AST  15  /  ALT  16  /  AlkPhos  157<H>  02-12      atorvastatin 80 milliGRAM(s) Oral at bedtime  dextrose 40% Gel 15 Gram(s) Oral once PRN  dextrose 50% Injectable 12.5 Gram(s) IV Push once  dextrose 50% Injectable 25 Gram(s) IV Push once  dextrose 50% Injectable 25 Gram(s) IV Push once  glucagon  Injectable 1 milliGRAM(s) IntraMuscular once PRN  insulin glargine Injectable (LANTUS) 12 Unit(s) SubCutaneous at bedtime  insulin lispro (HumaLOG) corrective regimen sliding scale   SubCutaneous three times a day before meals  insulin lispro (HumaLOG) corrective regimen sliding scale   SubCutaneous at bedtime  insulin lispro Injectable (HumaLOG) 3 Unit(s) SubCutaneous three times a day before meals

## 2020-02-12 NOTE — PROGRESS NOTE ADULT - SUBJECTIVE AND OBJECTIVE BOX
[facility] - Glynn Bullock, Susan, Claudia, Flaquita, Terry Mora  Office Number:  [phone]    Patient resting comfortably in bed in NAD.  No overnight events.  No new complaints.    F/U for:  CAD    MEDICATIONS  (STANDING):  aspirin enteric coated 81 milliGRAM(s) Oral daily  atorvastatin 80 milliGRAM(s) Oral at bedtime  chlorhexidine 2% Cloths 1 Application(s) Topical daily  dextrose 5%. 1000 milliLiter(s) (50 mL/Hr) IV Continuous <Continuous>  dextrose 50% Injectable 12.5 Gram(s) IV Push once  dextrose 50% Injectable 25 Gram(s) IV Push once  dextrose 50% Injectable 25 Gram(s) IV Push once  epoetin analilia Injectable 16511 Unit(s) IV Push <User Schedule>  gabapentin 300 milliGRAM(s) Oral two times a day  heparin  Injectable 5000 Unit(s) SubCutaneous every 8 hours  insulin glargine Injectable (LANTUS) 12 Unit(s) SubCutaneous at bedtime  insulin lispro (HumaLOG) corrective regimen sliding scale   SubCutaneous three times a day before meals  insulin lispro (HumaLOG) corrective regimen sliding scale   SubCutaneous at bedtime  insulin lispro Injectable (HumaLOG) 3 Unit(s) SubCutaneous three times a day before meals  isosorbide   mononitrate ER Tablet (IMDUR) 60 milliGRAM(s) Oral daily  melatonin 3 milliGRAM(s) Oral at bedtime  metoprolol tartrate 50 milliGRAM(s) Oral two times a day  multivitamin 1 Tablet(s) Oral daily  NIFEdipine XL 30 milliGRAM(s) Oral daily    MEDICATIONS  (PRN):  acetaminophen   Tablet .. 650 milliGRAM(s) Oral every 6 hours PRN Mild Pain (1 - 3)  dextrose 40% Gel 15 Gram(s) Oral once PRN Blood Glucose LESS THAN 70 milliGRAM(s)/deciLiter  glucagon  Injectable 1 milliGRAM(s) IntraMuscular once PRN Glucose <70 milliGRAM(s)/deciLiter  traMADol 25 milliGRAM(s) Oral four times a day PRN Moderate Pain (4 - 6)      Allergies    ertapenem (Urticaria)  Purell (Rash)          Vital Signs Last 24 Hrs  T(C): 36.8 (12 Feb 2020 15:47), Max: 36.8 (12 Feb 2020 07:39)  T(F): 98.3 (12 Feb 2020 15:47), Max: 98.3 (12 Feb 2020 07:39)  HR: 89 (12 Feb 2020 15:47) (71 - 89)  BP: 150/71 (12 Feb 2020 15:47) (104/64 - 168/69)  BP(mean): --  RR: 19 (12 Feb 2020 15:47) (18 - 20)  SpO2: 98% (12 Feb 2020 15:47) (95% - 100%)    I&O's Summary    12 Feb 2020 07:01  -  12 Feb 2020 16:14  --------------------------------------------------------  IN: 0 mL / OUT: 1100 mL / NET: -1100 mL        ON EXAM:    Constitutional: NAD, awake and alert, well-developed  HEENT: Moist Mucous Membranes, Anicteric  Pulmonary: Non-labored, breath sounds are clear bilaterally, No wheezing, crackles or rhonchi, RIGHT ACW with port   Cardiovascular: Regular, S1 and S2 nl, No murmurs, rubs, gallops or clicks  Gastrointestinal: Bowel Sounds present, soft, nontender.   Lymph: RIGHT ankle with dressing in place, elevated on pillow   Skin: No visible rashes or ulcers.  Psych:  Mood & affect appropriate    LABS: All Labs Reviewed:                        9.8    11.76 )-----------( 267      ( 12 Feb 2020 06:48 )             32.8                         9.7    11.99 )-----------( 257      ( 10 Feb 2020 07:25 )             31.8     12 Feb 2020 06:48    135    |  100    |  56     ----------------------------<  145    4.8     |  26     |  5.00   10 Feb 2020 07:14    137    |  101    |  64     ----------------------------<  234    5.0     |  25     |  6.10     Ca    9.0        12 Feb 2020 06:48  Ca    8.3        10 Feb 2020 07:14    TPro  7.5    /  Alb  2.4    /  TBili  0.5    /  DBili  x      /  AST  15     /  ALT  16     /  AlkPhos  157    12 Feb 2020 06:48  TPro  7.0    /  Alb  2.4    /  TBili  0.4    /  DBili  x      /  AST  12     /  ALT  13     /  AlkPhos  152    10 Feb 2020 07:14

## 2020-02-12 NOTE — PROGRESS NOTE ADULT - ASSESSMENT
70 year olf female PMH HTN, DM, MI s/p stent x1 in 2007, ESRD on HD (MWF), diabetic neuropathy being admitted with right ankle fracture sp closed reduction and splinting     Right ankle fracture  - s/p R ankle Jethro fracture s/p closed reduction and splinting tolerated procedure well  - non-operative management no plans for surgical intervention at this time as per ortho recs  - Pain control  - Ortho following    CAD  - continue atorvastatin 80 mg daily  - continue aspirin 81 mg  - continue metoprolol tartrate 50 mg BID  - continue Imdur 60 mg daily  - Previous ECHO in 5/18 showed EF 50-55 percent with no significant valvular abnormalities.    HTN  - continue Imdur, Nifedipine and metoprolol  - Monitor routine hemodynamics    ESRD on HD  - Volume removal via HD as per Renal    - monitor and replete lytes, keep K>4, Mg>2.      DVT prophylaxis  - Heparin SQ as per primary      - Monitor and replete lytes, keep K>4, Mg>2.  - All other medical needs as per primary team.  - Other cardiovascular workup will depend on clinical course.  - Will continue to follow.

## 2020-02-12 NOTE — PROGRESS NOTE ADULT - ASSESSMENT
1.	ESRD on HD  2.	Diabetes  3.	Hypertension  4.	s/p fall, ankle fracture, s/p closed reduction    HD today. To continue HD TIW as scheduled. Fluid removal as tolerated by BP.   Dietary and PO fluid restriction. Epogen as needed for anemia. Ortho follow up.   Monitor blood sugar levels. Insulin coverage as needed. Dietary restriction.   Monitor BP trend. Titrate BP meds as needed. Salt restriction.

## 2020-02-12 NOTE — PROGRESS NOTE ADULT - SUBJECTIVE AND OBJECTIVE BOX
Patient is a 70y old  Female who presents with a chief complaint of fall (10 Feb 2020 12:33)      Patient seen in follow up for ESRD on HD.     PAST MEDICAL HISTORY:  H/O diabetic neuropathy  PAF (paroxysmal atrial fibrillation)  Chronic pancreatitis  Cholecystitis with cholangitis  Acute urinary retention  Liver abscess  ESRD (end stage renal disease) on dialysis  CAD (coronary artery disease)  Diabetes  CRF (chronic renal failure)  Hypertension  Pneumonia  Myocardial infarction  Hypertension  Diabetes    MEDICATIONS  (STANDING):  aspirin enteric coated 81 milliGRAM(s) Oral daily  atorvastatin 80 milliGRAM(s) Oral at bedtime  chlorhexidine 2% Cloths 1 Application(s) Topical daily  dextrose 5%. 1000 milliLiter(s) (50 mL/Hr) IV Continuous <Continuous>  dextrose 50% Injectable 12.5 Gram(s) IV Push once  dextrose 50% Injectable 25 Gram(s) IV Push once  dextrose 50% Injectable 25 Gram(s) IV Push once  epoetin analilia Injectable 96562 Unit(s) IV Push <User Schedule>  gabapentin 300 milliGRAM(s) Oral two times a day  heparin  Injectable 5000 Unit(s) SubCutaneous every 8 hours  insulin glargine Injectable (LANTUS) 12 Unit(s) SubCutaneous at bedtime  insulin lispro (HumaLOG) corrective regimen sliding scale   SubCutaneous three times a day before meals  insulin lispro (HumaLOG) corrective regimen sliding scale   SubCutaneous at bedtime  insulin lispro Injectable (HumaLOG) 3 Unit(s) SubCutaneous three times a day before meals  isosorbide   mononitrate ER Tablet (IMDUR) 60 milliGRAM(s) Oral daily  melatonin 3 milliGRAM(s) Oral at bedtime  metoprolol tartrate 50 milliGRAM(s) Oral two times a day  multivitamin 1 Tablet(s) Oral daily  NIFEdipine XL 30 milliGRAM(s) Oral daily    MEDICATIONS  (PRN):  acetaminophen   Tablet .. 650 milliGRAM(s) Oral every 6 hours PRN Mild Pain (1 - 3)  dextrose 40% Gel 15 Gram(s) Oral once PRN Blood Glucose LESS THAN 70 milliGRAM(s)/deciLiter  glucagon  Injectable 1 milliGRAM(s) IntraMuscular once PRN Glucose <70 milliGRAM(s)/deciLiter  traMADol 25 milliGRAM(s) Oral four times a day PRN Moderate Pain (4 - 6)    T(C): 36.2 (02-12-20 @ 12:45), Max: 36.9 (02-10-20 @ 19:41)  HR: 80 (02-12-20 @ 12:45) (70 - 83)  BP: 168/69 (02-12-20 @ 12:45) (104/64 - 168/69)  RR: 20 (02-12-20 @ 12:45)  SpO2: 100% (02-12-20 @ 12:45)  Wt(kg): --  I&O's Detail    12 Feb 2020 07:01  -  12 Feb 2020 14:48  --------------------------------------------------------  IN:  Total IN: 0 mL    OUT:    Other: 1100 mL  Total OUT: 1100 mL    Total NET: -1100 mL              PHYSICAL EXAM:  General: NAD  Respiratory: b/l air entry  Cardiovascular: S1 S2  Gastrointestinal: soft  Extremities:  Rt ankle dressing                   LABORATORY:                        9.8    11.76 )-----------( 267      ( 12 Feb 2020 06:48 )             32.8     02-12    135  |  100  |  56<H>  ----------------------------<  145<H>  4.8   |  26  |  5.00<H>    Ca    9.0      12 Feb 2020 06:48    TPro  7.5  /  Alb  2.4<L>  /  TBili  0.5  /  DBili  x   /  AST  15  /  ALT  16  /  AlkPhos  157<H>  02-12    Sodium, Serum: 135 mmol/L (02-12 @ 06:48)    Potassium, Serum: 4.8 mmol/L (02-12 @ 06:48)    Hemoglobin: 9.8 g/dL (02-12 @ 06:48)  Hemoglobin: 9.7 g/dL (02-10 @ 07:25)    Creatinine, Serum 5.00 (02-12 @ 06:48)  Creatinine, Serum 6.10 (02-10 @ 07:14)        LIVER FUNCTIONS - ( 12 Feb 2020 06:48 )  Alb: 2.4 g/dL / Pro: 7.5 g/dL / ALK PHOS: 157 U/L / ALT: 16 U/L / AST: 15 U/L / GGT: x

## 2020-02-12 NOTE — PROGRESS NOTE ADULT - PROBLEM SELECTOR PLAN 2
insulin scale  endo eval with Dr. PERLMAN  uncontrolled
insulin scale, lantus 12 units   endo eval with Dr. PERLMAN  uncontrolled
insulin scale, lantus 12 units   endo eval with Dr. PERLMAN appreciated  uncontrolled
insulin scale, lantus per endo  endo eval with Dr. PERLMAN appreciated  uncontrolled

## 2020-02-12 NOTE — PROGRESS NOTE ADULT - SUBJECTIVE AND OBJECTIVE BOX
Patient is a 70y old  Female who presents with a chief complaint of fall (12 Feb 2020 13:04)      INTERVAL /OVERNIGHT EVENTS: ankle pain better    MEDICATIONS  (STANDING):  aspirin enteric coated 81 milliGRAM(s) Oral daily  atorvastatin 80 milliGRAM(s) Oral at bedtime  chlorhexidine 2% Cloths 1 Application(s) Topical daily  dextrose 5%. 1000 milliLiter(s) (50 mL/Hr) IV Continuous <Continuous>  dextrose 50% Injectable 12.5 Gram(s) IV Push once  dextrose 50% Injectable 25 Gram(s) IV Push once  dextrose 50% Injectable 25 Gram(s) IV Push once  epoetin analilia Injectable 83614 Unit(s) IV Push <User Schedule>  gabapentin 300 milliGRAM(s) Oral two times a day  heparin  Injectable 5000 Unit(s) SubCutaneous every 8 hours  insulin glargine Injectable (LANTUS) 12 Unit(s) SubCutaneous at bedtime  insulin lispro (HumaLOG) corrective regimen sliding scale   SubCutaneous three times a day before meals  insulin lispro (HumaLOG) corrective regimen sliding scale   SubCutaneous at bedtime  insulin lispro Injectable (HumaLOG) 3 Unit(s) SubCutaneous three times a day before meals  isosorbide   mononitrate ER Tablet (IMDUR) 60 milliGRAM(s) Oral daily  melatonin 3 milliGRAM(s) Oral at bedtime  metoprolol tartrate 50 milliGRAM(s) Oral two times a day  multivitamin 1 Tablet(s) Oral daily  NIFEdipine XL 30 milliGRAM(s) Oral daily    MEDICATIONS  (PRN):  acetaminophen   Tablet .. 650 milliGRAM(s) Oral every 6 hours PRN Mild Pain (1 - 3)  dextrose 40% Gel 15 Gram(s) Oral once PRN Blood Glucose LESS THAN 70 milliGRAM(s)/deciLiter  glucagon  Injectable 1 milliGRAM(s) IntraMuscular once PRN Glucose <70 milliGRAM(s)/deciLiter  traMADol 25 milliGRAM(s) Oral four times a day PRN Moderate Pain (4 - 6)      Allergies    ertapenem (Urticaria)  Purell (Rash)    Intolerances        REVIEW OF SYSTEMS:  CONSTITUTIONAL: No fever, weight loss, or fatigue  EYES: No eye pain, visual disturbances, or discharge  ENMT:  No difficulty hearing, tinnitus, vertigo; No sinus or throat pain  NECK: No pain or stiffness  RESPIRATORY: No cough, wheezing, chills or hemoptysis; No shortness of breath  CARDIOVASCULAR: No chest pain, palpitations, dizziness, or leg swelling  GASTROINTESTINAL: No abdominal or epigastric pain. No nausea, vomiting, or hematemesis; No diarrhea or constipation. No melena or hematochezia.  GENITOURINARY: No dysuria, frequency, hematuria, or incontinence  NEUROLOGICAL: No headaches, memory loss, loss of strength, numbness, or tremors  SKIN: No itching, burning, rashes, or lesions   LYMPH NODES: No enlarged glands  ENDOCRINE: No heat or cold intolerance; No hair loss; No polydipsia or polyuria  MUSCULOSKELETAL: No joint pain or swelling; No muscle, back, or extremity pain  PSYCHIATRIC: No depression, anxiety, mood swings, or difficulty sleeping  HEME/LYMPH: No easy bruising, or bleeding gums  ALLERGY AND IMMUNOLOGIC: No hives or eczema    Vital Signs Last 24 Hrs  T(C): 36.2 (12 Feb 2020 12:45), Max: 36.8 (12 Feb 2020 07:39)  T(F): 97.2 (12 Feb 2020 12:45), Max: 98.3 (12 Feb 2020 07:39)  HR: 80 (12 Feb 2020 12:45) (71 - 80)  BP: 168/69 (12 Feb 2020 12:45) (104/64 - 168/69)  BP(mean): --  RR: 20 (12 Feb 2020 12:45) (18 - 20)  SpO2: 100% (12 Feb 2020 12:45) (95% - 100%)    PHYSICAL EXAM:  GENERAL: NAD, well-groomed, well-developed  HEAD:  Atraumatic, Normocephalic  EYES: EOMI, PERRLA, conjunctiva and sclera clear  ENMT: No tonsillar erythema, exudates, or enlargement; Moist mucous membranes, Good dentition, No lesions  NECK: Supple, No JVD, Normal thyroid  NERVOUS SYSTEM:  Alert & Oriented X3, Good concentration; Motor Strength 5/5 B/L upper and lower extremities; DTRs 2+ intact and symmetric  CHEST/LUNG: Clear to auscultation bilaterally; No rales, rhonchi, wheezing, or rubs  HEART: Regular rate and rhythm; No murmurs, rubs, or gallops  ABDOMEN: Soft, Nontender, Nondistended; Bowel sounds present  EXTREMITIES:  2+ Peripheral Pulses, No clubbing, cyanosis, or edema  LYMPH: No lymphadenopathy noted  SKIN: No rashes or lesions    LABS:                        9.8    11.76 )-----------( 267      ( 12 Feb 2020 06:48 )             32.8     12 Feb 2020 06:48    135    |  100    |  56     ----------------------------<  145    4.8     |  26     |  5.00     Ca    9.0        12 Feb 2020 06:48    TPro  7.5    /  Alb  2.4    /  TBili  0.5    /  DBili  x      /  AST  15     /  ALT  16     /  AlkPhos  157    12 Feb 2020 06:48        CAPILLARY BLOOD GLUCOSE      POCT Blood Glucose.: 130 mg/dL (12 Feb 2020 11:51)  POCT Blood Glucose.: 286 mg/dL (12 Feb 2020 09:05)  POCT Blood Glucose.: 222 mg/dL (12 Feb 2020 07:35)  POCT Blood Glucose.: 182 mg/dL (11 Feb 2020 21:41)  POCT Blood Glucose.: 223 mg/dL (11 Feb 2020 16:36)      RADIOLOGY & ADDITIONAL TESTS:    Notes Reviewed:  [x ] YES  [ ] NO    Care Discussed with Consultants/Other Providers [x ] YES  [ ] NO

## 2020-02-13 VITALS
TEMPERATURE: 98 F | DIASTOLIC BLOOD PRESSURE: 56 MMHG | OXYGEN SATURATION: 97 % | RESPIRATION RATE: 18 BRPM | HEART RATE: 78 BPM | SYSTOLIC BLOOD PRESSURE: 134 MMHG

## 2020-02-13 PROCEDURE — 99232 SBSQ HOSP IP/OBS MODERATE 35: CPT

## 2020-02-13 PROCEDURE — 85610 PROTHROMBIN TIME: CPT

## 2020-02-13 PROCEDURE — 86901 BLOOD TYPING SEROLOGIC RH(D): CPT

## 2020-02-13 PROCEDURE — 86706 HEP B SURFACE ANTIBODY: CPT

## 2020-02-13 PROCEDURE — 99261: CPT

## 2020-02-13 PROCEDURE — 99285 EMERGENCY DEPT VISIT HI MDM: CPT

## 2020-02-13 PROCEDURE — 36415 COLL VENOUS BLD VENIPUNCTURE: CPT

## 2020-02-13 PROCEDURE — 80048 BASIC METABOLIC PNL TOTAL CA: CPT

## 2020-02-13 PROCEDURE — 73610 X-RAY EXAM OF ANKLE: CPT | Mod: 26,RT,76

## 2020-02-13 PROCEDURE — 73630 X-RAY EXAM OF FOOT: CPT

## 2020-02-13 PROCEDURE — 85730 THROMBOPLASTIN TIME PARTIAL: CPT

## 2020-02-13 PROCEDURE — 86803 HEPATITIS C AB TEST: CPT

## 2020-02-13 PROCEDURE — 83036 HEMOGLOBIN GLYCOSYLATED A1C: CPT

## 2020-02-13 PROCEDURE — 80053 COMPREHEN METABOLIC PANEL: CPT

## 2020-02-13 PROCEDURE — 86704 HEP B CORE ANTIBODY TOTAL: CPT

## 2020-02-13 PROCEDURE — 93005 ELECTROCARDIOGRAM TRACING: CPT

## 2020-02-13 PROCEDURE — 86900 BLOOD TYPING SEROLOGIC ABO: CPT

## 2020-02-13 PROCEDURE — 86850 RBC ANTIBODY SCREEN: CPT

## 2020-02-13 PROCEDURE — 85027 COMPLETE CBC AUTOMATED: CPT

## 2020-02-13 PROCEDURE — 73610 X-RAY EXAM OF ANKLE: CPT

## 2020-02-13 PROCEDURE — 87340 HEPATITIS B SURFACE AG IA: CPT

## 2020-02-13 PROCEDURE — 73590 X-RAY EXAM OF LOWER LEG: CPT

## 2020-02-13 PROCEDURE — 97161 PT EVAL LOW COMPLEX 20 MIN: CPT

## 2020-02-13 PROCEDURE — 82962 GLUCOSE BLOOD TEST: CPT

## 2020-02-13 RX ORDER — TRAMADOL HYDROCHLORIDE 50 MG/1
0.5 TABLET ORAL
Qty: 0 | Refills: 0 | DISCHARGE
Start: 2020-02-13

## 2020-02-13 RX ORDER — ERYTHROPOIETIN 10000 [IU]/ML
0 INJECTION, SOLUTION INTRAVENOUS; SUBCUTANEOUS
Qty: 0 | Refills: 0 | DISCHARGE
Start: 2020-02-13

## 2020-02-13 RX ORDER — INSULIN GLARGINE 100 [IU]/ML
6 INJECTION, SOLUTION SUBCUTANEOUS
Qty: 0 | Refills: 0 | DISCHARGE

## 2020-02-13 RX ORDER — LANOLIN ALCOHOL/MO/W.PET/CERES
1 CREAM (GRAM) TOPICAL
Qty: 0 | Refills: 0 | DISCHARGE
Start: 2020-02-13

## 2020-02-13 RX ORDER — MORPHINE SULFATE 50 MG/1
0.5 CAPSULE, EXTENDED RELEASE ORAL ONCE
Refills: 0 | Status: DISCONTINUED | OUTPATIENT
Start: 2020-02-13 | End: 2020-02-13

## 2020-02-13 RX ORDER — OXYCODONE HYDROCHLORIDE 5 MG/1
5 TABLET ORAL
Refills: 0 | Status: DISCONTINUED | OUTPATIENT
Start: 2020-02-13 | End: 2020-02-13

## 2020-02-13 RX ORDER — HEPARIN SODIUM 5000 [USP'U]/ML
0 INJECTION INTRAVENOUS; SUBCUTANEOUS
Qty: 0 | Refills: 0 | DISCHARGE
Start: 2020-02-13

## 2020-02-13 RX ORDER — OXYCODONE HYDROCHLORIDE 5 MG/1
1 TABLET ORAL
Qty: 0 | Refills: 0 | DISCHARGE
Start: 2020-02-13

## 2020-02-13 RX ADMIN — CHLORHEXIDINE GLUCONATE 1 APPLICATION(S): 213 SOLUTION TOPICAL at 12:07

## 2020-02-13 RX ADMIN — Medication 3 UNIT(S): at 08:09

## 2020-02-13 RX ADMIN — OXYCODONE HYDROCHLORIDE 5 MILLIGRAM(S): 5 TABLET ORAL at 18:11

## 2020-02-13 RX ADMIN — Medication 2: at 12:03

## 2020-02-13 RX ADMIN — Medication 6: at 08:08

## 2020-02-13 RX ADMIN — Medication 81 MILLIGRAM(S): at 12:04

## 2020-02-13 RX ADMIN — GABAPENTIN 300 MILLIGRAM(S): 400 CAPSULE ORAL at 17:17

## 2020-02-13 RX ADMIN — Medication 650 MILLIGRAM(S): at 11:16

## 2020-02-13 RX ADMIN — HEPARIN SODIUM 5000 UNIT(S): 5000 INJECTION INTRAVENOUS; SUBCUTANEOUS at 14:28

## 2020-02-13 RX ADMIN — TRAMADOL HYDROCHLORIDE 25 MILLIGRAM(S): 50 TABLET ORAL at 10:40

## 2020-02-13 RX ADMIN — Medication 3 UNIT(S): at 17:18

## 2020-02-13 RX ADMIN — MORPHINE SULFATE 0.5 MILLIGRAM(S): 50 CAPSULE, EXTENDED RELEASE ORAL at 18:17

## 2020-02-13 RX ADMIN — Medication 50 MILLIGRAM(S): at 07:00

## 2020-02-13 RX ADMIN — Medication 3 UNIT(S): at 12:03

## 2020-02-13 RX ADMIN — HEPARIN SODIUM 5000 UNIT(S): 5000 INJECTION INTRAVENOUS; SUBCUTANEOUS at 07:36

## 2020-02-13 RX ADMIN — GABAPENTIN 300 MILLIGRAM(S): 400 CAPSULE ORAL at 07:00

## 2020-02-13 RX ADMIN — Medication 30 MILLIGRAM(S): at 07:00

## 2020-02-13 RX ADMIN — Medication 650 MILLIGRAM(S): at 12:16

## 2020-02-13 RX ADMIN — Medication 1 TABLET(S): at 12:00

## 2020-02-13 RX ADMIN — Medication 50 MILLIGRAM(S): at 17:16

## 2020-02-13 RX ADMIN — ISOSORBIDE MONONITRATE 60 MILLIGRAM(S): 60 TABLET, EXTENDED RELEASE ORAL at 12:03

## 2020-02-13 RX ADMIN — OXYCODONE HYDROCHLORIDE 5 MILLIGRAM(S): 5 TABLET ORAL at 17:17

## 2020-02-13 NOTE — PROGRESS NOTE ADULT - ATTENDING COMMENTS
Chart reviewed    Patient seen and examined    Agree with plan as outlined above
I personally saw and examined the patient in detail.  I have spoken to the above provider regarding the assessment and plan of care.  I reviewed the above assessment and plan of care, and agree.  I have made changes in the body of the note where appropriate.
Seen/examined. agree with above.
The patient was personally seen and examined, in addition to being examined and evaluated by NP.  All elements of the note were edited where appropriate.
I personally saw and examined the patient in detail.  I have spoken to the above provider regarding the assessment and plan of care.  I reviewed the above assessment and plan of care, and agree.  I have made changes in the body of the note where appropriate.

## 2020-02-13 NOTE — PROGRESS NOTE ADULT - SUBJECTIVE AND OBJECTIVE BOX
[facility] -- Glynn Bullock, Claudia Davis Pannella, Patel, Savella  Office # [phone]      Follow Up:    CAD    Subjective/Observations:   No events overnight resting comfortably in bed.  No complaints of chest pain, dyspnea, or palpitations reported. No signs of orthopnea or PND.  + intermittent pain RIGHT foot     REVIEW OF SYSTEMS: All other review of systems is negative unless indicated above    PAST MEDICAL & SURGICAL HISTORY:  H/O diabetic neuropathy  PAF (paroxysmal atrial fibrillation): 1 episode in 11/2018 while hospitalized for acute cholecystitis  Chronic pancreatitis  Cholecystitis with cholangitis  Acute urinary retention  Liver abscess: resolved  ESRD (end stage renal disease) on dialysis: MWF since 05/2018  CAD (coronary artery disease): s/p stent x 1 in 2007  Diabetes: Type 2 DM  Myocardial infarction: 2007  Hypertension  Cataract: IOL b/l  S/P arteriovenous (AV) fistula creation: 05/17/2019  History of biliary stent insertion: 11/2018 &amp; removal 7/2/19  H/O: hysterectomy: 1990      MEDICATIONS  (STANDING):  aspirin enteric coated 81 milliGRAM(s) Oral daily  atorvastatin 80 milliGRAM(s) Oral at bedtime  chlorhexidine 2% Cloths 1 Application(s) Topical daily  dextrose 5%. 1000 milliLiter(s) (50 mL/Hr) IV Continuous <Continuous>  dextrose 50% Injectable 12.5 Gram(s) IV Push once  dextrose 50% Injectable 25 Gram(s) IV Push once  dextrose 50% Injectable 25 Gram(s) IV Push once  epoetin analilia Injectable 85449 Unit(s) IV Push <User Schedule>  gabapentin 300 milliGRAM(s) Oral two times a day  heparin  Injectable 5000 Unit(s) SubCutaneous every 8 hours  insulin glargine Injectable (LANTUS) 12 Unit(s) SubCutaneous at bedtime  insulin lispro (HumaLOG) corrective regimen sliding scale   SubCutaneous three times a day before meals  insulin lispro (HumaLOG) corrective regimen sliding scale   SubCutaneous at bedtime  insulin lispro Injectable (HumaLOG) 3 Unit(s) SubCutaneous three times a day before meals  isosorbide   mononitrate ER Tablet (IMDUR) 60 milliGRAM(s) Oral daily  melatonin 3 milliGRAM(s) Oral at bedtime  metoprolol tartrate 50 milliGRAM(s) Oral two times a day  multivitamin 1 Tablet(s) Oral daily  NIFEdipine XL 30 milliGRAM(s) Oral daily    MEDICATIONS  (PRN):  acetaminophen   Tablet .. 650 milliGRAM(s) Oral every 6 hours PRN Mild Pain (1 - 3)  dextrose 40% Gel 15 Gram(s) Oral once PRN Blood Glucose LESS THAN 70 milliGRAM(s)/deciLiter  glucagon  Injectable 1 milliGRAM(s) IntraMuscular once PRN Glucose <70 milliGRAM(s)/deciLiter  traMADol 25 milliGRAM(s) Oral four times a day PRN Moderate Pain (4 - 6)      Allergies    ertapenem (Urticaria)  Purell (Rash)    Intolerances        Vital Signs Last 24 Hrs  T(C): 36.4 (13 Feb 2020 08:43), Max: 37.2 (12 Feb 2020 23:23)  T(F): 97.6 (13 Feb 2020 08:43), Max: 99 (12 Feb 2020 23:23)  HR: 72 (13 Feb 2020 08:43) (72 - 89)  BP: 145/75 (13 Feb 2020 08:43) (105/62 - 168/69)  BP(mean): --  RR: 18 (13 Feb 2020 08:43) (18 - 20)  SpO2: 99% (13 Feb 2020 08:43) (94% - 100%)    I&O's Summary    12 Feb 2020 07:01  -  13 Feb 2020 07:00  --------------------------------------------------------  IN: 300 mL / OUT: 1100 mL / NET: -800 mL          PHYSICAL EXAM:  TELE: not on tele   Constitutional: NAD, awake and alert, obese   HEENT: Moist Mucous Membranes, Anicteric  Pulmonary: Non-labored, breath sounds are clear bilaterally, No wheezing, crackles or rhonchi  Cardiovascular: Regular, S1 and S2 nl, No murmurs, rubs, gallops or clicks  Gastrointestinal: Bowel Sounds present, soft, nontender.   Lymph: No lymphadenopathy. No peripheral edema.  Skin: RIGHT foot with ace wrap in place   Psych:  Mood & affect appropriate    LABS: All Labs Reviewed:                        9.8    11.76 )-----------( 267      ( 12 Feb 2020 06:48 )             32.8     12 Feb 2020 06:48    135    |  100    |  56     ----------------------------<  145    4.8     |  26     |  5.00     Ca    9.0        12 Feb 2020 06:48    TPro  7.5    /  Alb  2.4    /  TBili  0.5    /  DBili  x      /  AST  15     /  ALT  16     /  AlkPhos  157    12 Feb 2020 06:48             ECG:    < from: 12 Lead ECG (02.07.20 @ 16:08) >    Ventricular Rate 74 BPM    Atrial Rate 74 BPM    P-R Interval 158 ms    QRS Duration 138 ms    Q-T Interval 446 ms    QTC Calculation(Bezet) 495 ms    P Axis 33 degrees    R Axis 94 degrees    T Axis -12 degrees    Diagnosis Line Normal sinus rhythm  Right bundle branch block  Inferior infarct (cited on or before 29-MAY-2019)    < end of copied text >      Echo:    Radiology:

## 2020-02-13 NOTE — PROGRESS NOTE ADULT - ASSESSMENT
1.	ESRD on HD  2.	Diabetes  3.	Hypertension  4.	s/p fall, ankle fracture, s/p closed reduction    HD tomorrow. To continue HD TIW as scheduled. Fluid removal as tolerated by BP.   Dietary and PO fluid restriction. Epogen as needed for anemia. Ortho follow up.   Monitor blood sugar levels. Insulin coverage as needed. Dietary restriction.   Monitor BP trend. Titrate BP meds as needed. Salt restriction.

## 2020-02-13 NOTE — PROGRESS NOTE ADULT - SUBJECTIVE AND OBJECTIVE BOX
CAPILLARY BLOOD GLUCOSE      POCT Blood Glucose.: 252 mg/dL (13 Feb 2020 08:03)  POCT Blood Glucose.: 270 mg/dL (12 Feb 2020 21:01)  POCT Blood Glucose.: 150 mg/dL (12 Feb 2020 16:29)  POCT Blood Glucose.: 130 mg/dL (12 Feb 2020 11:51)      Vital Signs Last 24 Hrs  T(C): 36.4 (13 Feb 2020 08:43), Max: 37.2 (12 Feb 2020 23:23)  T(F): 97.6 (13 Feb 2020 08:43), Max: 99 (12 Feb 2020 23:23)  HR: 72 (13 Feb 2020 08:43) (72 - 89)  BP: 145/75 (13 Feb 2020 08:43) (105/62 - 168/69)  BP(mean): --  RR: 18 (13 Feb 2020 08:43) (18 - 20)  SpO2: 99% (13 Feb 2020 08:43) (94% - 100%)    General: WN/WD NAD  Respiratory: CTA B/L  CV: RRR, S1S2, no murmurs, rubs or gallops  Abdominal: Soft, NT, ND +BS, Last BM  Extremities: No edema, + peripheral pulses     02-12    135  |  100  |  56<H>  ----------------------------<  145<H>  4.8   |  26  |  5.00<H>    Ca    9.0      12 Feb 2020 06:48    TPro  7.5  /  Alb  2.4<L>  /  TBili  0.5  /  DBili  x   /  AST  15  /  ALT  16  /  AlkPhos  157<H>  02-12      atorvastatin 80 milliGRAM(s) Oral at bedtime  dextrose 40% Gel 15 Gram(s) Oral once PRN  dextrose 50% Injectable 12.5 Gram(s) IV Push once  dextrose 50% Injectable 25 Gram(s) IV Push once  dextrose 50% Injectable 25 Gram(s) IV Push once  glucagon  Injectable 1 milliGRAM(s) IntraMuscular once PRN  insulin glargine Injectable (LANTUS) 12 Unit(s) SubCutaneous at bedtime  insulin lispro (HumaLOG) corrective regimen sliding scale   SubCutaneous three times a day before meals  insulin lispro (HumaLOG) corrective regimen sliding scale   SubCutaneous at bedtime  insulin lispro Injectable (HumaLOG) 3 Unit(s) SubCutaneous three times a day before meals

## 2020-02-13 NOTE — CHART NOTE - NSCHARTNOTEFT_GEN_A_CORE
Do you have Advance Directives (HCP / LV / Organ donation / Documentation of oral advance Directive):   (   x )  yes    (      )    NO                                                                            Do you have LV - Living will :                                                                                                                                             (    )  yes    (    x  )   No    Do you have HCP - Health Care Proxy:                                                                                                                            (   x  )  yes   (       ) N0    Do you have DNR- Do Not Resuscitate :                                                                                                                           (     )  yes  (   x     )  No  x  Do you have DNI- Do Not intubate  :                                                                                                                               (     )  yes   (  x     ) No    Do you have MOLST - Medical orders for Life sustaining treatment  :                                                                    (      ) yes    (     x  ) No    Decision Maker :  (     ) Patient     (   x   )  HCA   (     ) Public Health Law Surrogate     (      ) Surrogate  (       ) Guardian    Goals of Care :  (   x   )   Complete Care     (       ) No Limitations                              (       )   Comfort Care       (       )  Hospice                               (      )   Limited medical Intervention / s    Medical Interventions :   (    x    )   CPR       (        )  DNR                                               (     x   )  Intubation with MV - Mechanical Ventilation  (    x  ) BIPAP/CPAP    (         )   DNI                                               (     x    )  Artificial Nutrition -  IVF, TPN / PPN, Tube Feeds             (         )   No Feeding Tube                                                (     x   ) Use Antibiotics                         (          ) No Antibiotics                                                (      x   ) Blood and Blood Products     (         )   No Blood or Blood products                                                (     x     )  Dialysis                                    (         )  No Dialysis                                                (          )  Medical Management only  (         )  No Invasive Interventions or Surgery  Time spent :                        (    x   ) up to 30 minutes                       (           )   more than 30 minutes  Goals of care reviewed and discussed

## 2020-02-13 NOTE — PROGRESS NOTE ADULT - PROBLEM SELECTOR PLAN 1
cont lantus 12 units qhs  cont humalog 3 units 3x/day before meals  cont humalog scale coverage  cotn cons cho diet  goal bg 100-180 in hosp setting
cont lantus 12 units qhs  cont humalog scale coverage mod dose qac/qhs  add humalog 3 units 3x/day before meals  cont cons cho diet  goal bg 100-180 in hosp setting
cont lantus 12 units qhs  cont mod dose humalog scale coverage qac/qhs  add humalog 3 units 3x/day before meals  cont cons cho diet  goal bg 100-180 in hosp setting
increase lantus 15 units qhs  cont humalog 3 units 3x/day before meals  cont mod dose humalog scale coverage qac/qhs  goal bg 100-180 in hosp setting
ortho eval with Dr. ALEJANDRO  pain control with tylenol
ortho eval with Dr. ALEJANDRO  pain control with tylenol  d/c planning to [facility]   Family Bed side .
ortho eval with Dr. ALEJANDRO appreciated  pain control with tylenol  d/c planning to [facility]   Family Bed side .
ortho eval with Dr. ALEJANDRO appreciated  pain control with tylenol and tramadol  d/c planning to [facility]

## 2020-02-13 NOTE — PROGRESS NOTE ADULT - SUBJECTIVE AND OBJECTIVE BOX
Patient is a 70y old  Female who presents with a chief complaint of fall (10 Feb 2020 12:33)      Patient seen in follow up for ESRD on HD.     PAST MEDICAL HISTORY:  H/O diabetic neuropathy  PAF (paroxysmal atrial fibrillation)  Chronic pancreatitis  Cholecystitis with cholangitis  Acute urinary retention  Liver abscess  ESRD (end stage renal disease) on dialysis  CAD (coronary artery disease)  Diabetes  CRF (chronic renal failure)  Hypertension  Pneumonia  Myocardial infarction  Hypertension  Diabetes    MEDICATIONS  (STANDING):  aspirin enteric coated 81 milliGRAM(s) Oral daily  atorvastatin 80 milliGRAM(s) Oral at bedtime  chlorhexidine 2% Cloths 1 Application(s) Topical daily  dextrose 5%. 1000 milliLiter(s) (50 mL/Hr) IV Continuous <Continuous>  dextrose 50% Injectable 12.5 Gram(s) IV Push once  dextrose 50% Injectable 25 Gram(s) IV Push once  dextrose 50% Injectable 25 Gram(s) IV Push once  epoetin analilia Injectable 50203 Unit(s) IV Push <User Schedule>  gabapentin 300 milliGRAM(s) Oral two times a day  heparin  Injectable 5000 Unit(s) SubCutaneous every 8 hours  insulin glargine Injectable (LANTUS) 15 Unit(s) SubCutaneous at bedtime  insulin lispro (HumaLOG) corrective regimen sliding scale   SubCutaneous three times a day before meals  insulin lispro (HumaLOG) corrective regimen sliding scale   SubCutaneous at bedtime  insulin lispro Injectable (HumaLOG) 3 Unit(s) SubCutaneous three times a day before meals  isosorbide   mononitrate ER Tablet (IMDUR) 60 milliGRAM(s) Oral daily  melatonin 3 milliGRAM(s) Oral at bedtime  metoprolol tartrate 50 milliGRAM(s) Oral two times a day  multivitamin 1 Tablet(s) Oral daily  NIFEdipine XL 30 milliGRAM(s) Oral daily    MEDICATIONS  (PRN):  acetaminophen   Tablet .. 650 milliGRAM(s) Oral every 6 hours PRN Mild Pain (1 - 3)  dextrose 40% Gel 15 Gram(s) Oral once PRN Blood Glucose LESS THAN 70 milliGRAM(s)/deciLiter  glucagon  Injectable 1 milliGRAM(s) IntraMuscular once PRN Glucose <70 milliGRAM(s)/deciLiter  oxyCODONE    IR 5 milliGRAM(s) Oral four times a day PRN Severe Pain (7 - 10)  traMADol 25 milliGRAM(s) Oral four times a day PRN Moderate Pain (4 - 6)    T(C): 36.9 (02-13-20 @ 16:25), Max: 37.2 (02-12-20 @ 23:23)  HR: 78 (02-13-20 @ 16:25) (71 - 89)  BP: 134/56 (02-13-20 @ 16:25) (104/64 - 168/69)  RR: 18 (02-13-20 @ 16:25)  SpO2: 97% (02-13-20 @ 16:25)  Wt(kg): --  I&O's Detail    12 Feb 2020 07:01  -  13 Feb 2020 07:00  --------------------------------------------------------  IN:    Oral Fluid: 300 mL  Total IN: 300 mL    OUT:    Other: 1100 mL  Total OUT: 1100 mL    Total NET: -800 mL      PHYSICAL EXAM:  General: NAD  Respiratory: b/l air entry  Cardiovascular: S1 S2  Gastrointestinal: soft  Extremities:  Rt ankle dressing                   LABORATORY:                        9.8    11.76 )-----------( 267      ( 12 Feb 2020 06:48 )             32.8     02-12    135  |  100  |  56<H>  ----------------------------<  145<H>  4.8   |  26  |  5.00<H>    Ca    9.0      12 Feb 2020 06:48    TPro  7.5  /  Alb  2.4<L>  /  TBili  0.5  /  DBili  x   /  AST  15  /  ALT  16  /  AlkPhos  157<H>  02-12    Sodium, Serum: 135 mmol/L (02-12 @ 06:48)    Potassium, Serum: 4.8 mmol/L (02-12 @ 06:48)    Hemoglobin: 9.8 g/dL (02-12 @ 06:48)    Creatinine, Serum 5.00 (02-12 @ 06:48)        LIVER FUNCTIONS - ( 12 Feb 2020 06:48 )  Alb: 2.4 g/dL / Pro: 7.5 g/dL / ALK PHOS: 157 U/L / ALT: 16 U/L / AST: 15 U/L / GGT: x

## 2020-02-13 NOTE — PROGRESS NOTE ADULT - ASSESSMENT
70 year olf female PMH HTN, DM, MI s/p stent x1 in 2007, ESRD on HD (MWF), diabetic neuropathy being admitted with right ankle fracture sp closed reduction and splinting     Right ankle fracture  - s/p R ankle Jethro fracture s/p closed reduction and splinting tolerated procedure well  - non-operative management no plans for surgical intervention at this time as per ortho recs  - Pain control  - Ortho following    CAD  - continue atorvastatin 80 mg daily  - continue aspirin 81 mg  - continue metoprolol tartrate 50 mg BID  - continue Imdur 60 mg daily  - Previous ECHO in 5/18 showed EF 50-55 percent with no significant valvular abnormalities.    HTN  - continue Imdur, Nifedipine and metoprolol  - Monitor routine hemodynamics    ESRD on HD  - Volume removal via HD as per Renal    - monitor and replete lytes, keep K>4, Mg>2.      DVT prophylaxis  - Heparin SQ as per primary    Discharge planning, stable from CV standpoint     - Monitor and replete lytes, keep K>4, Mg>2.  - All other medical needs as per primary team.  - Other cardiovascular workup will depend on clinical course.  - Will continue to follow.  Samra Diego FNP-C  Cardiology NP  [id number] [phone]

## 2020-02-13 NOTE — PROGRESS NOTE ADULT - PROBLEM SELECTOR PROBLEM 1
Ankle fracture, bimalleolar, closed, right, initial encounter
Diabetes mellitus type 2, uncontrolled
Ankle fracture, bimalleolar, closed, right, initial encounter

## 2020-03-12 NOTE — DISCHARGE NOTE ADULT - NSFTFHOMEOTHERFT_GEN_ALL_CORE
Weakness Finasteride Male Counseling: Finasteride Counseling:  I discussed with the patient the risks of use of finasteride including but not limited to decreased libido, decreased ejaculate volume, gynecomastia, and depression. Women should not handle medication.  All of the patient's questions and concerns were addressed. Finasteride Counseling:  I discussed with the patient the risks of use of finasteride including but not limited to decreased libido, decreased ejaculate volume, gynecomastia, and depression. Women should not handle medication.  All of the patient's questions and concerns were addressed.

## 2020-03-20 NOTE — PROGRESS NOTE ADULT - PROBLEM SELECTOR PROBLEM 4
The [place] Prescription Drug Monitoring Program was reviewed today and patient is compliant with controlled medication refills. There are no irregularities in refills noted.     ESRD (end stage renal disease) on dialysis

## 2020-05-17 NOTE — PATIENT PROFILE ADULT - NSPROEDAREADYLEARN_GEN_A_NUR
5/19/2020               Babak Barnhart  [facility]   [place] [address]        Dear Babak (MR#[id number])    As we have been unable to contact you by phone, this letter is sent in regards to your, recent visit to the [facility] Emergency Department on 5/17/2020.  During the visit, tests were performed to assist the physician in a medical diagnosis.  A review of those tests requires that we notify you of the following:    Your culture test was positive for Gonorrhea, a sexually transmitted infection. This was already treated appropriately in the Emergency Department. .       Based on the above findings it is recommended that you seek testing for the presence of additional sexually transmitted infections from the Health Department. Also, it is advised that you inform your sexual partner(s) within the previous 60 days of the above findings and direct them to the Health Department for testing. Should your symptoms progress, it is important that you follow up with your primary care physician, your local urgent care office, or return to the emergency department for further work up in order to prevent long term health issues.      Thank you for your cooperation in the matter.    Sincerely,  ED Culture Follow-Up Staff  Howard Barreto, PharmD    [facility], Emergency Department  [address], [place] [address]  [phone] (ED Culture Line)  [phone] (Direct Line)                   anxiety

## 2020-08-06 NOTE — ASU PATIENT PROFILE, ADULT - NS TRANSFER HEARING AID
Forms complete and left with triage for fax.  Jarred Reeves PA-C  [facility] - Surgery     na

## 2020-08-17 ENCOUNTER — INPATIENT (INPATIENT)
Facility: HOSPITAL | Age: 70
LOS: 3 days | Discharge: HOME | DRG: 542 | End: 2020-08-21
Attending: FAMILY MEDICINE | Admitting: FAMILY MEDICINE
Payer: MEDICARE

## 2020-08-17 VITALS
TEMPERATURE: 97 F | DIASTOLIC BLOOD PRESSURE: 83 MMHG | HEART RATE: 100 BPM | WEIGHT: 210.1 LBS | SYSTOLIC BLOOD PRESSURE: 183 MMHG | RESPIRATION RATE: 15 BRPM | OXYGEN SATURATION: 97 %

## 2020-08-17 DIAGNOSIS — Z98.890 OTHER SPECIFIED POSTPROCEDURAL STATES: Chronic | ICD-10-CM

## 2020-08-17 DIAGNOSIS — E11.9 TYPE 2 DIABETES MELLITUS WITHOUT COMPLICATIONS: ICD-10-CM

## 2020-08-17 DIAGNOSIS — H26.9 UNSPECIFIED CATARACT: Chronic | ICD-10-CM

## 2020-08-17 DIAGNOSIS — S82.209A UNSPECIFIED FRACTURE OF SHAFT OF UNSPECIFIED TIBIA, INITIAL ENCOUNTER FOR CLOSED FRACTURE: ICD-10-CM

## 2020-08-17 DIAGNOSIS — E87.5 HYPERKALEMIA: ICD-10-CM

## 2020-08-17 DIAGNOSIS — M25.571 PAIN IN RIGHT ANKLE AND JOINTS OF RIGHT FOOT: ICD-10-CM

## 2020-08-17 DIAGNOSIS — Z90.710 ACQUIRED ABSENCE OF BOTH CERVIX AND UTERUS: Chronic | ICD-10-CM

## 2020-08-17 DIAGNOSIS — N18.6 END STAGE RENAL DISEASE: ICD-10-CM

## 2020-08-17 LAB
ALBUMIN SERPL ELPH-MCNC: 2.9 G/DL — LOW (ref 3.3–5)
ALP SERPL-CCNC: 148 U/L — HIGH (ref 40–120)
ALT FLD-CCNC: 13 U/L — SIGNIFICANT CHANGE UP (ref 12–78)
ANION GAP SERPL CALC-SCNC: 4 MMOL/L — LOW (ref 5–17)
AST SERPL-CCNC: 10 U/L — LOW (ref 15–37)
BASOPHILS # BLD AUTO: 0.05 K/UL — SIGNIFICANT CHANGE UP (ref 0–0.2)
BASOPHILS NFR BLD AUTO: 0.4 % — SIGNIFICANT CHANGE UP (ref 0–2)
BILIRUB SERPL-MCNC: 0.4 MG/DL — SIGNIFICANT CHANGE UP (ref 0.2–1.2)
BUN SERPL-MCNC: 83 MG/DL — HIGH (ref 7–23)
CALCIUM SERPL-MCNC: 8.1 MG/DL — LOW (ref 8.5–10.1)
CHLORIDE SERPL-SCNC: 103 MMOL/L — SIGNIFICANT CHANGE UP (ref 96–108)
CO2 SERPL-SCNC: 28 MMOL/L — SIGNIFICANT CHANGE UP (ref 22–31)
CREAT SERPL-MCNC: 7 MG/DL — HIGH (ref 0.5–1.3)
EOSINOPHIL # BLD AUTO: 0.37 K/UL — SIGNIFICANT CHANGE UP (ref 0–0.5)
EOSINOPHIL NFR BLD AUTO: 3.3 % — SIGNIFICANT CHANGE UP (ref 0–6)
GLUCOSE SERPL-MCNC: 190 MG/DL — HIGH (ref 70–99)
HCT VFR BLD CALC: 39.2 % — SIGNIFICANT CHANGE UP (ref 34.5–45)
HGB BLD-MCNC: 12.1 G/DL — SIGNIFICANT CHANGE UP (ref 11.5–15.5)
IMM GRANULOCYTES NFR BLD AUTO: 0.3 % — SIGNIFICANT CHANGE UP (ref 0–1.5)
LYMPHOCYTES # BLD AUTO: 3.56 K/UL — HIGH (ref 1–3.3)
LYMPHOCYTES # BLD AUTO: 31.6 % — SIGNIFICANT CHANGE UP (ref 13–44)
MCHC RBC-ENTMCNC: 28.8 PG — SIGNIFICANT CHANGE UP (ref 27–34)
MCHC RBC-ENTMCNC: 30.9 GM/DL — LOW (ref 32–36)
MCV RBC AUTO: 93.3 FL — SIGNIFICANT CHANGE UP (ref 80–100)
MONOCYTES # BLD AUTO: 0.72 K/UL — SIGNIFICANT CHANGE UP (ref 0–0.9)
MONOCYTES NFR BLD AUTO: 6.4 % — SIGNIFICANT CHANGE UP (ref 2–14)
NEUTROPHILS # BLD AUTO: 6.53 K/UL — SIGNIFICANT CHANGE UP (ref 1.8–7.4)
NEUTROPHILS NFR BLD AUTO: 58 % — SIGNIFICANT CHANGE UP (ref 43–77)
NRBC # BLD: 0 /100 WBCS — SIGNIFICANT CHANGE UP (ref 0–0)
PLATELET # BLD AUTO: 285 K/UL — SIGNIFICANT CHANGE UP (ref 150–400)
POTASSIUM SERPL-MCNC: 6.7 MMOL/L — CRITICAL HIGH (ref 3.5–5.3)
POTASSIUM SERPL-SCNC: 6.7 MMOL/L — CRITICAL HIGH (ref 3.5–5.3)
PROT SERPL-MCNC: 7.8 G/DL — SIGNIFICANT CHANGE UP (ref 6–8.3)
RBC # BLD: 4.2 M/UL — SIGNIFICANT CHANGE UP (ref 3.8–5.2)
RBC # FLD: 14.4 % — SIGNIFICANT CHANGE UP (ref 10.3–14.5)
SARS-COV-2 RNA SPEC QL NAA+PROBE: SIGNIFICANT CHANGE UP
SODIUM SERPL-SCNC: 135 MMOL/L — SIGNIFICANT CHANGE UP (ref 135–145)
WBC # BLD: 11.26 K/UL — HIGH (ref 3.8–10.5)
WBC # FLD AUTO: 11.26 K/UL — HIGH (ref 3.8–10.5)

## 2020-08-17 PROCEDURE — 99283 EMERGENCY DEPT VISIT LOW MDM: CPT

## 2020-08-17 PROCEDURE — 73610 X-RAY EXAM OF ANKLE: CPT | Mod: 26,RT,77

## 2020-08-17 PROCEDURE — 73610 X-RAY EXAM OF ANKLE: CPT | Mod: 26,RT

## 2020-08-17 PROCEDURE — 73590 X-RAY EXAM OF LOWER LEG: CPT | Mod: 26,RT

## 2020-08-17 PROCEDURE — 73590 X-RAY EXAM OF LOWER LEG: CPT | Mod: 26,RT,77

## 2020-08-17 PROCEDURE — 93971 EXTREMITY STUDY: CPT | Mod: 26,RT

## 2020-08-17 PROCEDURE — 93010 ELECTROCARDIOGRAM REPORT: CPT

## 2020-08-17 PROCEDURE — 71045 X-RAY EXAM CHEST 1 VIEW: CPT | Mod: 26

## 2020-08-17 PROCEDURE — 73562 X-RAY EXAM OF KNEE 3: CPT | Mod: 26,RT

## 2020-08-17 RX ORDER — ISOSORBIDE MONONITRATE 60 MG/1
60 TABLET, EXTENDED RELEASE ORAL DAILY
Refills: 0 | Status: DISCONTINUED | OUTPATIENT
Start: 2020-08-17 | End: 2020-08-21

## 2020-08-17 RX ORDER — ATORVASTATIN CALCIUM 80 MG/1
80 TABLET, FILM COATED ORAL AT BEDTIME
Refills: 0 | Status: DISCONTINUED | OUTPATIENT
Start: 2020-08-17 | End: 2020-08-21

## 2020-08-17 RX ORDER — SODIUM CHLORIDE 9 MG/ML
1000 INJECTION, SOLUTION INTRAVENOUS
Refills: 0 | Status: DISCONTINUED | OUTPATIENT
Start: 2020-08-17 | End: 2020-08-21

## 2020-08-17 RX ORDER — DEXTROSE 50 % IN WATER 50 %
15 SYRINGE (ML) INTRAVENOUS ONCE
Refills: 0 | Status: DISCONTINUED | OUTPATIENT
Start: 2020-08-17 | End: 2020-08-21

## 2020-08-17 RX ORDER — HEPARIN SODIUM 5000 [USP'U]/ML
5000 INJECTION INTRAVENOUS; SUBCUTANEOUS EVERY 12 HOURS
Refills: 0 | Status: DISCONTINUED | OUTPATIENT
Start: 2020-08-17 | End: 2020-08-18

## 2020-08-17 RX ORDER — DEXTROSE 50 % IN WATER 50 %
25 SYRINGE (ML) INTRAVENOUS ONCE
Refills: 0 | Status: DISCONTINUED | OUTPATIENT
Start: 2020-08-17 | End: 2020-08-21

## 2020-08-17 RX ORDER — GABAPENTIN 400 MG/1
300 CAPSULE ORAL
Refills: 0 | Status: DISCONTINUED | OUTPATIENT
Start: 2020-08-17 | End: 2020-08-21

## 2020-08-17 RX ORDER — MORPHINE SULFATE 50 MG/1
2 CAPSULE, EXTENDED RELEASE ORAL EVERY 6 HOURS
Refills: 0 | Status: DISCONTINUED | OUTPATIENT
Start: 2020-08-17 | End: 2020-08-21

## 2020-08-17 RX ORDER — TRAMADOL HYDROCHLORIDE 50 MG/1
25 TABLET ORAL EVERY 4 HOURS
Refills: 0 | Status: DISCONTINUED | OUTPATIENT
Start: 2020-08-17 | End: 2020-08-21

## 2020-08-17 RX ORDER — INSULIN LISPRO 100/ML
VIAL (ML) SUBCUTANEOUS
Refills: 0 | Status: DISCONTINUED | OUTPATIENT
Start: 2020-08-17 | End: 2020-08-21

## 2020-08-17 RX ORDER — DEXTROSE 50 % IN WATER 50 %
12.5 SYRINGE (ML) INTRAVENOUS ONCE
Refills: 0 | Status: DISCONTINUED | OUTPATIENT
Start: 2020-08-17 | End: 2020-08-21

## 2020-08-17 RX ORDER — INSULIN HUMAN 100 [IU]/ML
5 INJECTION, SOLUTION SUBCUTANEOUS ONCE
Refills: 0 | Status: COMPLETED | OUTPATIENT
Start: 2020-08-17 | End: 2020-08-17

## 2020-08-17 RX ORDER — INSULIN GLARGINE 100 [IU]/ML
8 INJECTION, SOLUTION SUBCUTANEOUS
Qty: 0 | Refills: 0 | DISCHARGE

## 2020-08-17 RX ORDER — INSULIN LISPRO 100/ML
VIAL (ML) SUBCUTANEOUS AT BEDTIME
Refills: 0 | Status: DISCONTINUED | OUTPATIENT
Start: 2020-08-17 | End: 2020-08-21

## 2020-08-17 RX ORDER — ASPIRIN/CALCIUM CARB/MAGNESIUM 324 MG
81 TABLET ORAL DAILY
Refills: 0 | Status: DISCONTINUED | OUTPATIENT
Start: 2020-08-17 | End: 2020-08-21

## 2020-08-17 RX ORDER — CALCIUM GLUCONATE 100 MG/ML
1 VIAL (ML) INTRAVENOUS ONCE
Refills: 0 | Status: COMPLETED | OUTPATIENT
Start: 2020-08-17 | End: 2020-08-17

## 2020-08-17 RX ORDER — METOPROLOL TARTRATE 50 MG
50 TABLET ORAL
Refills: 0 | Status: DISCONTINUED | OUTPATIENT
Start: 2020-08-17 | End: 2020-08-21

## 2020-08-17 RX ORDER — SODIUM BICARBONATE 1 MEQ/ML
50 SYRINGE (ML) INTRAVENOUS ONCE
Refills: 0 | Status: COMPLETED | OUTPATIENT
Start: 2020-08-17 | End: 2020-08-17

## 2020-08-17 RX ORDER — LANOLIN ALCOHOL/MO/W.PET/CERES
3 CREAM (GRAM) TOPICAL AT BEDTIME
Refills: 0 | Status: DISCONTINUED | OUTPATIENT
Start: 2020-08-17 | End: 2020-08-21

## 2020-08-17 RX ORDER — GLUCAGON INJECTION, SOLUTION 0.5 MG/.1ML
1 INJECTION, SOLUTION SUBCUTANEOUS ONCE
Refills: 0 | Status: DISCONTINUED | OUTPATIENT
Start: 2020-08-17 | End: 2020-08-21

## 2020-08-17 RX ORDER — DEXTROSE 50 % IN WATER 50 %
25 SYRINGE (ML) INTRAVENOUS ONCE
Refills: 0 | Status: COMPLETED | OUTPATIENT
Start: 2020-08-17 | End: 2020-08-17

## 2020-08-17 RX ORDER — INSULIN GLARGINE 100 [IU]/ML
8 INJECTION, SOLUTION SUBCUTANEOUS AT BEDTIME
Refills: 0 | Status: DISCONTINUED | OUTPATIENT
Start: 2020-08-17 | End: 2020-08-20

## 2020-08-17 RX ORDER — OXYCODONE HYDROCHLORIDE 5 MG/1
5 TABLET ORAL EVERY 4 HOURS
Refills: 0 | Status: DISCONTINUED | OUTPATIENT
Start: 2020-08-17 | End: 2020-08-21

## 2020-08-17 RX ORDER — ACETAMINOPHEN 500 MG
650 TABLET ORAL EVERY 6 HOURS
Refills: 0 | Status: DISCONTINUED | OUTPATIENT
Start: 2020-08-17 | End: 2020-08-21

## 2020-08-17 RX ORDER — NIFEDIPINE 30 MG
60 TABLET, EXTENDED RELEASE 24 HR ORAL DAILY
Refills: 0 | Status: DISCONTINUED | OUTPATIENT
Start: 2020-08-17 | End: 2020-08-21

## 2020-08-17 RX ADMIN — Medication 100 GRAM(S): at 18:01

## 2020-08-17 RX ADMIN — Medication 50 MILLIEQUIVALENT(S): at 18:02

## 2020-08-17 RX ADMIN — TRAMADOL HYDROCHLORIDE 25 MILLIGRAM(S): 50 TABLET ORAL at 19:00

## 2020-08-17 RX ADMIN — INSULIN HUMAN 5 UNIT(S): 100 INJECTION, SOLUTION SUBCUTANEOUS at 18:01

## 2020-08-17 RX ADMIN — Medication 25 MILLILITER(S): at 18:02

## 2020-08-17 NOTE — ED PROVIDER NOTE - ATTENDING CONTRIBUTION TO CARE
I have personally performed a face to face diagnostic evaluation on this patient.  I have reviewed the PA note and agree with the history, exam, and plan of care, except as noted.  History and Exam by me shows patient presents to ER with daughter in law c/o right ankle pain for the past 2-3 days, has history of prior right ankle fracture, uses a walker, patient now unable to bear weight due to pain, does not recall falling, patient alert and oriented, no acute distress, heart and lungs clear, tender to lateral malleous, f/u xrays, ortho consult, patient daughter states unable to take care of at home, due to patient having difficulty walking, reynaldo admit.

## 2020-08-17 NOTE — ED PROVIDER NOTE - PROGRESS NOTE DETAILS
spoke with ortho resident, case discussed, will see patient  as per daughter cant take patient home , there are stairs at home> labs, ekg, cxr ordered spoke with Dr Mcclure, case discussed,will admit patient paged renal Dr. Albarran, awaiting call back

## 2020-08-17 NOTE — CONSULT NOTE ADULT - SUBJECTIVE AND OBJECTIVE BOX
NEPHROLOGY CONSULTATION    CHIEF COMPLAINT:  ESRD      HPI:  Came to ER because of pain in foot/leg and inability to ambulate.  She has ESRD on hemodialysis and did not go for treatment today.        ROS:  denies SOB      PAST MEDICAL & SURGICAL HISTORY:  H/O diabetic neuropathy  PAF (paroxysmal atrial fibrillation): 1 episode in 11/2018 while hospitalized for acute cholecystitis  Chronic pancreatitis  Cholecystitis with cholangitis  Acute urinary retention  Liver abscess: resolved  ESRD (end stage renal disease) on dialysis: MWF since 05/2018  CAD (coronary artery disease): s/p stent x 1 in 2007  Diabetes: Type 2 DM  Myocardial infarction: 2007  Hypertension  Cataract: IOL b/l  S/P arteriovenous (AV) fistula creation: 05/17/2019  History of biliary stent insertion: 11/2018 &amp; removal 7/2/19  H/O: hysterectomy: 1990      SOCIAL HISTORY:  NA    FAMILY HISTORY:  NA      MEDICATIONS  (STANDING):  calcium gluconate IVPB 1 Gram(s) IV Intermittent once  dextrose 50% Injectable 25 milliLiter(s) IV Push once  insulin regular  human recombinant. 5 Unit(s) IV Push once  sodium bicarbonate  Injectable 50 milliEquivalent(s) IV Push once      PHYSICAL EXAMINATION:  T(F): 97.4 (08-17-20 @ 13:12)  HR: 100 (08-17-20 @ 13:12)  BP: 183/83 (08-17-20 @ 13:12)  RR: 15 (08-17-20 @ 13:12)  SpO2: 97% (08-17-20 @ 13:12)  Conversant, no apparent distress  PERRLA, pink conjunctivae, no ptosis  Good dentition, no pharyngeal erythema  Neck non tender, no mass, no thyromegaly or nodules  Normal respiratory effort, lungs clear to auscultation  Heart with RRR, no murmurs or rubs, no peripheral edema  Abdomen soft, no masses, no organomegaly  Skin no rashes, ulcers or lesions, normal turgor and temperature  Appropriate affect, AO x 3    LABS:                        12.1   11.26 )-----------( 285      ( 17 Aug 2020 16:24 )             39.2     08-17    135  |  103  |  83<H>  ----------------------------<  190<H>  6.7<HH>   |  28  |  7.00<H>    Ca    8.1<L>      17 Aug 2020 16:24    TPro  7.8  /  Alb  2.9<L>  /  TBili  0.4  /  DBili  x   /  AST  10<L>  /  ALT  13  /  AlkPhos  148<H>  08-17        ASSESSMENT:  1.  Acute/subacute distal fibular shaft fracture, chronic medial malleolus fx with nonunion, chronic lateral malleolus fx with malunion, and chronic insufficiency fx of tibial plateau  2.  ESRD on hemodialysis with hyperkalemia (chronic issue)    PLAN:  Ordered dialysis session tonight in the hospital given hyperkalemia.  She has already received medical therapy.  Low K diet.

## 2020-08-17 NOTE — H&P ADULT - RS GEN PE MLT RESP DETAILS PC
respirations non-labored/breath sounds equal/no intercostal retractions/good air movement/clear to auscultation bilaterally/no chest wall tenderness/airway patent/no rhonchi/no rales

## 2020-08-17 NOTE — ED ADULT NURSE NOTE - SUICIDE SCREENING QUESTION 1
No
53 yo M with right facial swelling. Outpatient dental workup likely for dental infection. Labs grossly unremarkable. CT face remarkable for facial swelling but no discrete abscess. Patient to follow up with dentist and continue outpatient abx as prescribed. Vital signs stable. Nontoxic and medically stable for discharged. Return precautions provided and patient understands to return to the ED for worsening signs and symptoms. Instructed to follow up with dentist and agreeable. Patient's questions answered.

## 2020-08-17 NOTE — ED PROVIDER NOTE - OBJECTIVE STATEMENT
70 y female as per daughter at bedside, patient has been having right ankle pain with rom , walking x 2 days,  states patient uses a walker , does not walk often, this past weekend, visited family was walking with walker.  denies fall, twisting ankle, or any trauma.  daughter states she has been giving her tylenol for pain.  patient also takes gabapentin.  states patient has hx of fracture of her right ankle 4 mos ago, was casted, went to rehab, has been home for 1 month.  does not recall name of orthopedic.  PMD Dr Sotomayor.    PMH:  Acute urinary retention    CAD (coronary artery disease)  s/p stent x 1 in 2007  Cholecystitis with cholangitis    Chronic pancreatitis    Diabetes  Type 2 DM  ESRD (end stage renal disease) on dialysis  MWF since 05/2018  H/O diabetic neuropathy    Hypertension    Liver abscess  resolved  Myocardial infarction  2007  PAF (paroxysmal atrial fibrillation)  1 episode in 11/2018 while hospitalized for acute cholecystitis

## 2020-08-17 NOTE — CONSULT NOTE ADULT - ASSESSMENT
Assessment/Plan:  70y Female with RIGHT distal fibular shaft fracture, medial malleolus nonunion, partially healed lateral malleolus fracture.     ******INCOMPLETE NOTE IN PROGRESS******  ******INCOMPLETE NOTE IN PROGRESS******  ******INCOMPLETE NOTE IN PROGRESS******      -Pain control as needed  -NWB RIGHT Lower Extremity in trilam splint  -Keep splint clean, dry, and intact  -Ice and elevation encouraged  -Wiggle toes periodically   -Pt educated on signs/symptoms of compartment syndrome, instructed to return to ER if s/s present, patient expresses full understanding  -Potential need for surgical intervention in future discussed  -No acute/emergent orthopedic surgical intervention needed at this time  -Follow up with Dr. Joe in 5-7 days, please call office for appointment  -Discussed with attending, who agrees with plan, and will advise if plan changes   -Ortho stable for discharge Assessment/Plan:  70y Female with RIGHT acute/subacute distal fibular shaft fracture, chronic medial malleolus fx with nonunion, chronic lateral malleolus fx with malunion, and chronic insufficiency fx of tibial plateau    ******INCOMPLETE NOTE IN PROGRESS******  ******INCOMPLETE NOTE IN PROGRESS******  ******INCOMPLETE NOTE IN PROGRESS******      -Pain control as needed  -NWB RIGHT Lower Extremity in trilam splint  -Keep splint clean, dry, and intact  -Ice and elevation encouraged  -Wiggle toes periodically   -Pt educated on signs/symptoms of compartment syndrome, instructed to return to ER if s/s present, patient expresses full understanding  -Potential need for surgical intervention in future discussed  -No acute/emergent orthopedic surgical intervention needed at this time  -Follow up with Dr. Joe in 3-5 days, please call office for appointment  -Discussed with attending, who agrees with plan, and will advise if plan changes   -Ortho stable for discharge Assessment/Plan:  70y Female with RIGHT acute/subacute distal fibular shaft fracture, chronic medial malleolus fx with nonunion, chronic lateral malleolus fx with malunion, and chronic insufficiency fx of tibial plateau    -Pain control as needed  -NWB RIGHT Lower Extremity in trilam splint  -Keep splint clean, dry, and intact  -Ice and elevation encouraged  -Wiggle toes periodically   -Pt educated on signs/symptoms of compartment syndrome, instructed to return to ER if s/s present, patient expresses full understanding  -Potential need for surgical intervention in future discussed  -No acute/emergent orthopedic surgical intervention needed at this time  -Follow up with Dr. Joe in 3-5 days, please call office for appointment  -Discussed with attending, who agrees with plan, and will advise if plan changes   -Ortho stable for discharge Assessment/Plan:  70y Female with RIGHT acute/subacute distal fibular shaft fracture, chronic medial malleolus fx with nonunion, chronic lateral malleolus fx with malunion, and chronic insufficiency fx of tibial plateau    -Pt initially placed in splint, but upon further review with attending, it was decided to make pt WBAT in CAM boot  -WBAT RIGHT Lower Extremity in CAM boot  -Pain control as needed  -Ice as needed and elevation encouraged  -No orthopedic surgical intervention needed at this time  -Recommend follow up with Dr. Joe in 5-7 days, please call office for appointment  -Medical management per primary team  -Discussed with attending, who agrees with plan, and will advise if plan changes   -Ortho stable Assessment/Plan:  70y Female with RIGHT acute/subacute distal fibular shaft fracture, chronic medial malleolus fx with nonunion, chronic lateral malleolus fx with malunion, and chronic insufficiency fx of tibial plateau    -Pt initially placed in splint, but upon further review with attending, it was decided to make pt WBAT in CAM boot  -WBAT RIGHT Lower Extremity in CAM boot  -Daughter reports she has CAM boot at home and will bring to hospital for patient  -If daughter does not provide CAM boot, will order CAM boot for pt  -Pain control as needed  -Ice as needed and elevation encouraged  -No orthopedic surgical intervention needed at this time  -Recommend follow up with Dr. Joe in 5-7 days, please call office for appointment  -Medical management per primary team  -Discussed with attending, who agrees with plan, and will advise if plan changes   -Ortho stable

## 2020-08-17 NOTE — ED PROVIDER NOTE - CARE PLAN
Principal Discharge DX:	Tibia fracture  Secondary Diagnosis:	Right ankle pain, unspecified chronicity Principal Discharge DX:	Tibia fracture  Secondary Diagnosis:	Right ankle pain, unspecified chronicity  Secondary Diagnosis:	Hyperkalemia

## 2020-08-17 NOTE — ED PROVIDER NOTE - CLINICAL SUMMARY MEDICAL DECISION MAKING FREE TEXT BOX
right ankle, right calf pain, will obtain imaging, doppler, give pain med , r/o stress fx vs sprain, r/o dvt

## 2020-08-17 NOTE — ED ADULT NURSE NOTE - OBJECTIVE STATEMENT
Patient stable No acute distress noted. MSpencer Patient stable No acute distress noted. MSpencer  Pottasium elevated coctail given as ordered. redraw blood at 1900. Patient is alert and shows no signs of distress & placed on cardiac monitor. MSpencer Patient stable No acute distress noted. Jaleesaencer  Pottasium elevated coctail given as ordered. redraw blood at 1900. Patient is alert and shows no signs of distress & placed on cardiac monitor. Austin  Gave report to Dialysis patient to be sent. Jaleesaencer

## 2020-08-17 NOTE — H&P ADULT - NSHPLABSRESULTS_GEN_ALL_CORE
12.1   11.26 )-----------( 285      ( 17 Aug 2020 16:24 )             39.2     17 Aug 2020 16:24    135    |  103    |  83     ----------------------------<  190    6.7     |  28     |  7.00     Ca    8.1        17 Aug 2020 16:24    TPro  7.8    /  Alb  2.9    /  TBili  0.4    /  DBili  x      /  AST  10     /  ALT  13     /  AlkPhos  148    17 Aug 2020 16:24    LIVER FUNCTIONS - ( 17 Aug 2020 16:24 )  Alb: 2.9 g/dL / Pro: 7.8 g/dL / ALK PHOS: 148 U/L / ALT: 13 U/L / AST: 10 U/L / GGT: x             CAPILLARY BLOOD GLUCOSE  < from: Xray Tibia + Fibula 2 Views, Right (08.17.20 @ 13:59) >    IMPRESSION:      1. Fractures of the mediolateral of tibial plateaus which may be insufficiency fractures of the proximal tibia. Associated degenerative changes.  2. Nonunion ofthe medial malleolar fracture of the right ankle with a tibiotalar anterior joint space widening/instability. Partial healing of a distal fibular fracture, but with a new fracture of the distal shaft above the ankle fracture.    < end of copied text >    < from: Xray Chest 1 View- PORTABLE-Urgent (08.17.20 @ 15:48) >    IMPRESSION:  Cardiomegaly with congestive changes. Perma cath in situ.    < end of copied text >

## 2020-08-17 NOTE — H&P ADULT - HISTORY OF PRESENT ILLNESS
70 y female as per daughter at bedside, patient has been having right ankle pain with rom , walking x 2 days,  states patient uses a walker , does not walk often, this past weekend, visited family was walking with walker.  denies fall, twisting ankle, or any trauma.  daughter states she has been giving her tylenol for pain.  patient also takes gabapentin.  states patient has hx of fracture of her right ankle 4 mos ago, was casted, went to rehab, has been home for 1 month.  does not recall name of orthopedic.  PMD Dr Sotomayor.    	PMH:  Acute urinary retention 70 y female as per daughter at bedside, patient has been having right ankle pain with ROM and on walking x 2 days,  states patient uses a walker , does not walk often, this past weekend, visited family was walking with walker.  denies fall, twisting ankle, or any trauma.  daughter states she has been giving her tylenol for pain.  patient also takes gabapentin.  states patient has hx of fracture of her right ankle 4 mos ago, was casted, went to rehab, has been home for 1 month.  does not recall name of orthopedic.

## 2020-08-18 LAB
A1C WITH ESTIMATED AVERAGE GLUCOSE RESULT: 7.8 % — HIGH (ref 4–5.6)
ANION GAP SERPL CALC-SCNC: 5 MMOL/L — SIGNIFICANT CHANGE UP (ref 5–17)
BUN SERPL-MCNC: 55 MG/DL — HIGH (ref 7–23)
CALCIUM SERPL-MCNC: 7.9 MG/DL — LOW (ref 8.5–10.1)
CHLORIDE SERPL-SCNC: 101 MMOL/L — SIGNIFICANT CHANGE UP (ref 96–108)
CO2 SERPL-SCNC: 29 MMOL/L — SIGNIFICANT CHANGE UP (ref 22–31)
CREAT SERPL-MCNC: 5.1 MG/DL — HIGH (ref 0.5–1.3)
ESTIMATED AVERAGE GLUCOSE: 177 MG/DL — HIGH (ref 68–114)
GLUCOSE SERPL-MCNC: 177 MG/DL — HIGH (ref 70–99)
HCT VFR BLD CALC: 40.9 % — SIGNIFICANT CHANGE UP (ref 34.5–45)
HGB BLD-MCNC: 12.4 G/DL — SIGNIFICANT CHANGE UP (ref 11.5–15.5)
MCHC RBC-ENTMCNC: 28.7 PG — SIGNIFICANT CHANGE UP (ref 27–34)
MCHC RBC-ENTMCNC: 30.3 GM/DL — LOW (ref 32–36)
MCV RBC AUTO: 94.7 FL — SIGNIFICANT CHANGE UP (ref 80–100)
NRBC # BLD: 0 /100 WBCS — SIGNIFICANT CHANGE UP (ref 0–0)
PLATELET # BLD AUTO: 261 K/UL — SIGNIFICANT CHANGE UP (ref 150–400)
POTASSIUM SERPL-MCNC: 6 MMOL/L — HIGH (ref 3.5–5.3)
POTASSIUM SERPL-SCNC: 6 MMOL/L — HIGH (ref 3.5–5.3)
RBC # BLD: 4.32 M/UL — SIGNIFICANT CHANGE UP (ref 3.8–5.2)
RBC # FLD: 14.1 % — SIGNIFICANT CHANGE UP (ref 10.3–14.5)
SARS-COV-2 IGG SERPL QL IA: POSITIVE
SARS-COV-2 IGM SERPL IA-ACNC: 31.2 INDEX — HIGH
SODIUM SERPL-SCNC: 135 MMOL/L — SIGNIFICANT CHANGE UP (ref 135–145)
WBC # BLD: 9.11 K/UL — SIGNIFICANT CHANGE UP (ref 3.8–10.5)
WBC # FLD AUTO: 9.11 K/UL — SIGNIFICANT CHANGE UP (ref 3.8–10.5)

## 2020-08-18 RX ORDER — HEPARIN SODIUM 5000 [USP'U]/ML
5000 INJECTION INTRAVENOUS; SUBCUTANEOUS EVERY 8 HOURS
Refills: 0 | Status: DISCONTINUED | OUTPATIENT
Start: 2020-08-18 | End: 2020-08-21

## 2020-08-18 RX ADMIN — INSULIN GLARGINE 8 UNIT(S): 100 INJECTION, SOLUTION SUBCUTANEOUS at 00:15

## 2020-08-18 RX ADMIN — Medication 81 MILLIGRAM(S): at 11:37

## 2020-08-18 RX ADMIN — HEPARIN SODIUM 5000 UNIT(S): 5000 INJECTION INTRAVENOUS; SUBCUTANEOUS at 15:20

## 2020-08-18 RX ADMIN — Medication 3: at 17:29

## 2020-08-18 RX ADMIN — GABAPENTIN 300 MILLIGRAM(S): 400 CAPSULE ORAL at 05:37

## 2020-08-18 RX ADMIN — INSULIN GLARGINE 8 UNIT(S): 100 INJECTION, SOLUTION SUBCUTANEOUS at 21:44

## 2020-08-18 RX ADMIN — Medication 60 MILLIGRAM(S): at 05:37

## 2020-08-18 RX ADMIN — ATORVASTATIN CALCIUM 80 MILLIGRAM(S): 80 TABLET, FILM COATED ORAL at 00:15

## 2020-08-18 RX ADMIN — ATORVASTATIN CALCIUM 80 MILLIGRAM(S): 80 TABLET, FILM COATED ORAL at 21:43

## 2020-08-18 RX ADMIN — Medication 50 MILLIGRAM(S): at 18:17

## 2020-08-18 RX ADMIN — HEPARIN SODIUM 5000 UNIT(S): 5000 INJECTION INTRAVENOUS; SUBCUTANEOUS at 05:37

## 2020-08-18 RX ADMIN — HEPARIN SODIUM 5000 UNIT(S): 5000 INJECTION INTRAVENOUS; SUBCUTANEOUS at 21:43

## 2020-08-18 RX ADMIN — Medication 1 TABLET(S): at 11:37

## 2020-08-18 RX ADMIN — GABAPENTIN 300 MILLIGRAM(S): 400 CAPSULE ORAL at 18:17

## 2020-08-18 RX ADMIN — Medication 2: at 12:23

## 2020-08-18 RX ADMIN — Medication 3 MILLIGRAM(S): at 00:15

## 2020-08-18 RX ADMIN — Medication 50 MILLIGRAM(S): at 05:37

## 2020-08-18 RX ADMIN — ISOSORBIDE MONONITRATE 60 MILLIGRAM(S): 60 TABLET, EXTENDED RELEASE ORAL at 11:37

## 2020-08-18 NOTE — OCCUPATIONAL THERAPY INITIAL EVALUATION ADULT - IADL RETRAINING, OT EVAL
Patient will increase standing tolerance to 2-3 minutes with RW in order to promote safe toileting at commode within 3-5 sessions

## 2020-08-18 NOTE — PHYSICAL THERAPY INITIAL EVALUATION ADULT - RANGE OF MOTION EXAMINATION, REHAB EVAL
grossly WFL except R ankle not tested/bilateral upper extremity ROM was WFL (within functional limits)/bilateral lower extremity ROM was WFL (within functional limits)

## 2020-08-18 NOTE — PHYSICAL THERAPY INITIAL EVALUATION ADULT - PERTINENT HX OF CURRENT PROBLEM, REHAB EVAL
71 y/o female adm 8/17 + closed bimalleolar fx. s/p closed reduction 2/20. Pt admitted with non healing fracture right tibia with pseudo- joint formation.. X-ray of 8/18 showed non union of med malleolus fracture with a new fracture of distal shaft above ankle fracture

## 2020-08-18 NOTE — DIETITIAN INITIAL EVALUATION ADULT. - ADD RECOMMEND
1) Continue therapeutic diet. 2) Encourage continued adequate intake. 3) Provide dietary education reinforcement/teach-back as appropriate. 4) Monitor weights, labs, intake, GI tolerance, skin integrity.

## 2020-08-18 NOTE — DIETITIAN INITIAL EVALUATION ADULT. - OTHER INFO
Pt is a 69 y/o F with PMH T2DM (on home insulin; HgbA1c 7.8% indicating suboptimal control), ESRD on HD MWF (since 5/2018), Cholecystitis with cholangitis, Chronic pancreatitis, HTN, MI. Presents with R ankle pain. Pending ortho consult.    Pt Roberto-speaking. Family not present upon visit, however RNA available for translation. Pt reports good appetite/intake with no s/s GI distress noted. +BM 8/17. Currently receiving Consistent CHO, Renal diet. Pt states consumption of a variety of fruits/vegetables, rice, roti, minimal chicken, and large quantity of beans. Noted pt with hyperkalemia (8/18 K 6.7). Limited compliance with renal/consistent CHO diet inferred in setting of altered lab values. Stated weight 85 kg/187 lb. Documented weight 210 lb, ?accuracy. Per previous RD note (2/2020), pt weighed 173 lb and received written education regarding DM diet. Pt amenable to written education for renal diet to control high potassium. Provided nutrition therapy packet for pt/family. NKFA per EMR.

## 2020-08-18 NOTE — PROGRESS NOTE ADULT - SUBJECTIVE AND OBJECTIVE BOX
Patient is a 70y old  Female who presents with a chief complaint of Non healing fracture of right tibia with pseudo joint formation (17 Aug 2020 19:29)    Patient seen in follow up for ESRD on HD.        PAST MEDICAL HISTORY:  H/O diabetic neuropathy  PAF (paroxysmal atrial fibrillation)  Chronic pancreatitis  Cholecystitis with cholangitis  Acute urinary retention  Liver abscess  ESRD (end stage renal disease) on dialysis  CAD (coronary artery disease)  Diabetes  CRF (chronic renal failure)  Hypertension  Pneumonia  Myocardial infarction  Hypertension  Diabetes    MEDICATIONS  (STANDING):  aspirin enteric coated 81 milliGRAM(s) Oral daily  atorvastatin 80 milliGRAM(s) Oral at bedtime  dextrose 5%. 1000 milliLiter(s) (50 mL/Hr) IV Continuous <Continuous>  dextrose 50% Injectable 12.5 Gram(s) IV Push once  dextrose 50% Injectable 25 Gram(s) IV Push once  dextrose 50% Injectable 25 Gram(s) IV Push once  gabapentin 300 milliGRAM(s) Oral two times a day  heparin   Injectable 5000 Unit(s) SubCutaneous every 12 hours  insulin glargine Injectable (LANTUS) 8 Unit(s) SubCutaneous at bedtime  insulin lispro (HumaLOG) corrective regimen sliding scale   SubCutaneous three times a day before meals  insulin lispro (HumaLOG) corrective regimen sliding scale   SubCutaneous at bedtime  isosorbide   mononitrate ER Tablet (IMDUR) 60 milliGRAM(s) Oral daily  melatonin 3 milliGRAM(s) Oral at bedtime  metoprolol tartrate 50 milliGRAM(s) Oral two times a day  multivitamin 1 Tablet(s) Oral daily  NIFEdipine XL 60 milliGRAM(s) Oral daily    MEDICATIONS  (PRN):  acetaminophen   Tablet .. 650 milliGRAM(s) Oral every 6 hours PRN Mild Pain (1 - 3)  dextrose 40% Gel 15 Gram(s) Oral once PRN Blood Glucose LESS THAN 70 milliGRAM(s)/deciLiter  glucagon  Injectable 1 milliGRAM(s) IntraMuscular once PRN Glucose <70 milliGRAM(s)/deciLiter  morphine  - Injectable 2 milliGRAM(s) IV Push every 6 hours PRN breakthrough pain  oxyCODONE    IR 5 milliGRAM(s) Oral every 4 hours PRN Severe Pain (7 - 10)  traMADol 25 milliGRAM(s) Oral every 4 hours PRN Moderate Pain (4 - 6)    T(C): 36.6 (08-18-20 @ 11:36), Max: 36.8 (08-17-20 @ 18:00)  HR: 66 (08-18-20 @ 11:36) (66 - 100)  BP: 151/84 (08-18-20 @ 11:36) (121/65 - 193/84)  RR: 18 (08-18-20 @ 11:36) (15 - 20)  SpO2: 96% (08-18-20 @ 11:36) (95% - 99%)  Wt(kg): --  I&O's Detail    17 Aug 2020 07:01  -  18 Aug 2020 07:00  --------------------------------------------------------  IN:  Total IN: 0 mL    OUT:    Other: 1500 mL  Total OUT: 1500 mL    Total NET: -1500 mL          PHYSICAL EXAM:  General: No distress  Respiratory: b/l air entry  Cardiovascular: S1 S2  Gastrointestinal: soft  Extremities:  edema, left AVF                              12.1   11.26 )-----------( 285      ( 17 Aug 2020 16:24 )             39.2     08-17    135  |  103  |  83<H>  ----------------------------<  190<H>  6.7<HH>   |  28  |  7.00<H>    Ca    8.1<L>      17 Aug 2020 16:24    TPro  7.8  /  Alb  2.9<L>  /  TBili  0.4  /  DBili  x   /  AST  10<L>  /  ALT  13  /  AlkPhos  148<H>  08-17        LIVER FUNCTIONS - ( 17 Aug 2020 16:24 )  Alb: 2.9 g/dL / Pro: 7.8 g/dL / ALK PHOS: 148 U/L / ALT: 13 U/L / AST: 10 U/L / GGT: x               Sodium, Serum: 135 (08-17 @ 16:24)    Creatinine, Serum: 7.00 (08-17 @ 16:24)    Potassium, Serum: 6.7 (08-17 @ 16:24)    Hemoglobin: 12.1 (08-17 @ 16:24)

## 2020-08-18 NOTE — PHYSICAL THERAPY INITIAL EVALUATION ADULT - ADDITIONAL COMMENTS
Pt lives with her spouse and family in a house, + chair lift. Pt ambulates independently with RW and is mostly independent with ADLs. Pt goes to dialysis MWF. Information obtained from [facility] horn 2/20.

## 2020-08-18 NOTE — OCCUPATIONAL THERAPY INITIAL EVALUATION ADULT - ADDITIONAL COMMENTS
Pt reported she lives with her family in a private home, 3x2 steps to negotiate. PTA pt was using RW for functional mobility. Pt was independent with ADLs except had supervision/assist for bathing. Bathroom has a tub with chair, (+) raised toilet seat. Pt reports she has bedside commode.    XR tibia/fibula right: Two views of the right leg show similar orientation of the acute fracture of the lower fibula. Tibial plateau fracture and trimalleolar fractures of the ankle are again noted    XR right ankle: Three views of the right ankle show slight reduction of the ankle mortise with similar orientation of the acute fracture of the lower fibula and what may be an old nonunited fracture of the lower fibula. Backward displacement of the talar dome and the lateral view is grossly similar.

## 2020-08-18 NOTE — CHART NOTE - NSCHARTNOTEFT_GEN_A_CORE
Called by RN for patient with elevated potassium. Patient with h/o ESRD on HD. K+ on admission 6.7 followed by administration of calcium gluconate and insulin. Most recent BMP with K+ 6.0. Patient remains asymptomatic.        LABS:                        12.4   9.11  )-----------( 261      ( 18 Aug 2020 13:57 )             40.9     08-18    135  |  101  |  55<H>  ----------------------------<  177<H>  6.0<H>   |  29  |  5.10<H>    Ca    7.9<L>      18 Aug 2020 20:16    TPro  7.8  /  Alb  2.9<L>  /  TBili  0.4  /  DBili  x   /  AST  10<L>  /  ALT  13  /  AlkPhos  148<H>  08-17          Assessment/Plan  71yo F, with PMH/o CAD, T2DM, ESRD on HD (MWF), HTN, presenting with R ankle pain, admitted for nonhealing fracture of R tibia. Now called by RN for hyperkalemia.    - Hyperkalemia downtrending though remains elevated  - EKG performed, appears grossly unchanged from previous  - Patient denies symptoms, no acute need for calcium gluconate at this time  - S/p 5 units insulin in the ED, and continues to receive Lantus and ISS for h/o T2DM  - Nephro following, plan for HD tomorrow  - RN to call with any changes

## 2020-08-18 NOTE — PROGRESS NOTE ADULT - SUBJECTIVE AND OBJECTIVE BOX
Patient is a 70y old  Female who presents with a chief complaint of Non healing fracture of right tibia with pseudo joint formation (18 Aug 2020 12:08)      INTERVAL /OVERNIGHT EVENTS: refusing boot    MEDICATIONS  (STANDING):  aspirin enteric coated 81 milliGRAM(s) Oral daily  atorvastatin 80 milliGRAM(s) Oral at bedtime  dextrose 5%. 1000 milliLiter(s) (50 mL/Hr) IV Continuous <Continuous>  dextrose 50% Injectable 12.5 Gram(s) IV Push once  dextrose 50% Injectable 25 Gram(s) IV Push once  dextrose 50% Injectable 25 Gram(s) IV Push once  gabapentin 300 milliGRAM(s) Oral two times a day  heparin   Injectable 5000 Unit(s) SubCutaneous every 8 hours  insulin glargine Injectable (LANTUS) 8 Unit(s) SubCutaneous at bedtime  insulin lispro (HumaLOG) corrective regimen sliding scale   SubCutaneous three times a day before meals  insulin lispro (HumaLOG) corrective regimen sliding scale   SubCutaneous at bedtime  isosorbide   mononitrate ER Tablet (IMDUR) 60 milliGRAM(s) Oral daily  melatonin 3 milliGRAM(s) Oral at bedtime  metoprolol tartrate 50 milliGRAM(s) Oral two times a day  multivitamin 1 Tablet(s) Oral daily  NIFEdipine XL 60 milliGRAM(s) Oral daily    MEDICATIONS  (PRN):  acetaminophen   Tablet .. 650 milliGRAM(s) Oral every 6 hours PRN Mild Pain (1 - 3)  dextrose 40% Gel 15 Gram(s) Oral once PRN Blood Glucose LESS THAN 70 milliGRAM(s)/deciLiter  glucagon  Injectable 1 milliGRAM(s) IntraMuscular once PRN Glucose <70 milliGRAM(s)/deciLiter  morphine  - Injectable 2 milliGRAM(s) IV Push every 6 hours PRN breakthrough pain  oxyCODONE    IR 5 milliGRAM(s) Oral every 4 hours PRN Severe Pain (7 - 10)  traMADol 25 milliGRAM(s) Oral every 4 hours PRN Moderate Pain (4 - 6)      Allergies    ertapenem (Urticaria)  Purell (Rash)    Intolerances        REVIEW OF SYSTEMS:  CONSTITUTIONAL: No fever, weight loss, or fatigue  EYES: No eye pain, visual disturbances, or discharge  ENMT:  No difficulty hearing, tinnitus, vertigo; No sinus or throat pain  NECK: No pain or stiffness  RESPIRATORY: No cough, wheezing, chills or hemoptysis; No shortness of breath  CARDIOVASCULAR: No chest pain, palpitations, dizziness, or leg swelling  GASTROINTESTINAL: No abdominal or epigastric pain. No nausea, vomiting, or hematemesis; No diarrhea or constipation. No melena or hematochezia.  GENITOURINARY: No dysuria, frequency, hematuria, or incontinence  NEUROLOGICAL: No headaches, memory loss, loss of strength, numbness, or tremors  SKIN: No itching, burning, rashes, or lesions   LYMPH NODES: No enlarged glands  ENDOCRINE: No heat or cold intolerance; No hair loss; No polydipsia or polyuria  MUSCULOSKELETAL: + joint pain or swelling; No muscle, back, or extremity pain  PSYCHIATRIC: No depression, anxiety, mood swings, or difficulty sleeping  HEME/LYMPH: No easy bruising, or bleeding gums  ALLERGY AND IMMUNOLOGIC: No hives or eczema    Vital Signs Last 24 Hrs  T(C): 36.6 (18 Aug 2020 11:36), Max: 36.7 (17 Aug 2020 22:55)  T(F): 97.9 (18 Aug 2020 11:36), Max: 98 (17 Aug 2020 22:55)  HR: 70 (18 Aug 2020 18:15) (66 - 75)  BP: 147/65 (18 Aug 2020 18:15) (121/65 - 193/84)  BP(mean): --  RR: 18 (18 Aug 2020 11:36) (18 - 20)  SpO2: 96% (18 Aug 2020 11:36) (95% - 99%)    PHYSICAL EXAM:  GENERAL: NAD, well-groomed, well-developed  HEAD:  Atraumatic, Normocephalic  EYES: EOMI, PERRLA, conjunctiva and sclera clear  ENMT: No tonsillar erythema, exudates, or enlargement; Moist mucous membranes, Good dentition, No lesions  NECK: Supple, No JVD, Normal thyroid  NERVOUS SYSTEM:  Alert & Oriented X3, Good concentration; Motor Strength 5/5 B/L upper and lower extremities; DTRs 2+ intact and symmetric  CHEST/LUNG: Clear to auscultation bilaterally; No rales, rhonchi, wheezing, or rubs  HEART: Regular rate and rhythm; No murmurs, rubs, or gallops  ABDOMEN: Soft, Nontender, Nondistended; Bowel sounds present  EXTREMITIES:  2+ Peripheral Pulses, No clubbing, cyanosis, or edema  LYMPH: No lymphadenopathy noted  SKIN: No rashes or lesions    LABS:                        12.4   9.11  )-----------( 261      ( 18 Aug 2020 13:57 )             40.9       Ca    8.1        17 Aug 2020 16:24          CAPILLARY BLOOD GLUCOSE      POCT Blood Glucose.: 267 mg/dL (18 Aug 2020 17:19)  POCT Blood Glucose.: 235 mg/dL (18 Aug 2020 12:11)  POCT Blood Glucose.: 150 mg/dL (18 Aug 2020 08:05)  POCT Blood Glucose.: 258 mg/dL (18 Aug 2020 00:14)  POCT Blood Glucose.: 170 mg/dL (17 Aug 2020 22:12)      RADIOLOGY & ADDITIONAL TESTS:    Notes Reviewed:  [x ] YES  [ ] NO    Care Discussed with Consultants/Other Providers [ x] YES  [ ] NO

## 2020-08-18 NOTE — OCCUPATIONAL THERAPY INITIAL EVALUATION ADULT - PERTINENT HX OF CURRENT PROBLEM, REHAB EVAL
Pt is a 71 y/o female p/w right ankle pain with ROM and on walking x 2 days, visited family was walking with walker. denies fall, twisting ankle, or any trauma. states patient has hx of fracture of her right ankle 4 mos ago, was casted, went to rehab, has been home for 1 month.

## 2020-08-18 NOTE — PROGRESS NOTE ADULT - ASSESSMENT
1.	ESRD on HD  2.	Diabetes  3.	Hypertension  4.	h/o fall, ankle fracture, s/p closed reduction    HD tomorrow. To continue HD TIW as scheduled. Fluid removal as tolerated by BP.   Dietary and PO fluid restriction. Hold Epogen. Ortho follow up.   Monitor blood sugar levels. Insulin coverage as needed. Dietary restriction.   Monitor BP trend. Titrate BP meds as needed. Salt restriction.

## 2020-08-19 LAB
ANION GAP SERPL CALC-SCNC: 7 MMOL/L — SIGNIFICANT CHANGE UP (ref 5–17)
APTT BLD: 31.5 SEC — SIGNIFICANT CHANGE UP (ref 27.5–35.5)
BASOPHILS # BLD AUTO: 0.04 K/UL — SIGNIFICANT CHANGE UP (ref 0–0.2)
BASOPHILS NFR BLD AUTO: 0.5 % — SIGNIFICANT CHANGE UP (ref 0–2)
BUN SERPL-MCNC: 44 MG/DL — HIGH (ref 7–23)
CALCIUM SERPL-MCNC: 7.9 MG/DL — LOW (ref 8.5–10.1)
CHLORIDE SERPL-SCNC: 102 MMOL/L — SIGNIFICANT CHANGE UP (ref 96–108)
CO2 SERPL-SCNC: 28 MMOL/L — SIGNIFICANT CHANGE UP (ref 22–31)
CREAT SERPL-MCNC: 4.3 MG/DL — HIGH (ref 0.5–1.3)
EOSINOPHIL # BLD AUTO: 0.39 K/UL — SIGNIFICANT CHANGE UP (ref 0–0.5)
EOSINOPHIL NFR BLD AUTO: 4.4 % — SIGNIFICANT CHANGE UP (ref 0–6)
GLUCOSE SERPL-MCNC: 155 MG/DL — HIGH (ref 70–99)
HBV CORE AB SER-ACNC: SIGNIFICANT CHANGE UP
HBV SURFACE AB SER-ACNC: <3 MIU/ML — LOW
HBV SURFACE AG SER-ACNC: SIGNIFICANT CHANGE UP
HCT VFR BLD CALC: 37.9 % — SIGNIFICANT CHANGE UP (ref 34.5–45)
HCV AB S/CO SERPL IA: 0.22 S/CO — SIGNIFICANT CHANGE UP (ref 0–0.99)
HCV AB SERPL-IMP: SIGNIFICANT CHANGE UP
HGB BLD-MCNC: 11.5 G/DL — SIGNIFICANT CHANGE UP (ref 11.5–15.5)
IMM GRANULOCYTES NFR BLD AUTO: 0.2 % — SIGNIFICANT CHANGE UP (ref 0–1.5)
INR BLD: 1.21 RATIO — HIGH (ref 0.88–1.16)
LYMPHOCYTES # BLD AUTO: 2.67 K/UL — SIGNIFICANT CHANGE UP (ref 1–3.3)
LYMPHOCYTES # BLD AUTO: 30.1 % — SIGNIFICANT CHANGE UP (ref 13–44)
MCHC RBC-ENTMCNC: 28.3 PG — SIGNIFICANT CHANGE UP (ref 27–34)
MCHC RBC-ENTMCNC: 30.3 GM/DL — LOW (ref 32–36)
MCV RBC AUTO: 93.3 FL — SIGNIFICANT CHANGE UP (ref 80–100)
MONOCYTES # BLD AUTO: 0.65 K/UL — SIGNIFICANT CHANGE UP (ref 0–0.9)
MONOCYTES NFR BLD AUTO: 7.3 % — SIGNIFICANT CHANGE UP (ref 2–14)
NEUTROPHILS # BLD AUTO: 5.11 K/UL — SIGNIFICANT CHANGE UP (ref 1.8–7.4)
NEUTROPHILS NFR BLD AUTO: 57.5 % — SIGNIFICANT CHANGE UP (ref 43–77)
NRBC # BLD: 0 /100 WBCS — SIGNIFICANT CHANGE UP (ref 0–0)
PLATELET # BLD AUTO: 261 K/UL — SIGNIFICANT CHANGE UP (ref 150–400)
POTASSIUM SERPL-MCNC: 5.3 MMOL/L — SIGNIFICANT CHANGE UP (ref 3.5–5.3)
POTASSIUM SERPL-SCNC: 5.3 MMOL/L — SIGNIFICANT CHANGE UP (ref 3.5–5.3)
PROTHROM AB SERPL-ACNC: 14 SEC — HIGH (ref 10.6–13.6)
RBC # BLD: 4.06 M/UL — SIGNIFICANT CHANGE UP (ref 3.8–5.2)
RBC # FLD: 14.2 % — SIGNIFICANT CHANGE UP (ref 10.3–14.5)
SODIUM SERPL-SCNC: 137 MMOL/L — SIGNIFICANT CHANGE UP (ref 135–145)
WBC # BLD: 8.88 K/UL — SIGNIFICANT CHANGE UP (ref 3.8–10.5)
WBC # FLD AUTO: 8.88 K/UL — SIGNIFICANT CHANGE UP (ref 3.8–10.5)

## 2020-08-19 PROCEDURE — 36589 REMOVAL TUNNELED CV CATH: CPT

## 2020-08-19 RX ADMIN — HEPARIN SODIUM 5000 UNIT(S): 5000 INJECTION INTRAVENOUS; SUBCUTANEOUS at 05:20

## 2020-08-19 RX ADMIN — Medication 81 MILLIGRAM(S): at 16:32

## 2020-08-19 RX ADMIN — Medication 60 MILLIGRAM(S): at 05:20

## 2020-08-19 RX ADMIN — Medication 1: at 21:46

## 2020-08-19 RX ADMIN — Medication 50 MILLIGRAM(S): at 05:20

## 2020-08-19 RX ADMIN — ISOSORBIDE MONONITRATE 60 MILLIGRAM(S): 60 TABLET, EXTENDED RELEASE ORAL at 16:32

## 2020-08-19 RX ADMIN — GABAPENTIN 300 MILLIGRAM(S): 400 CAPSULE ORAL at 18:12

## 2020-08-19 RX ADMIN — Medication 3 MILLIGRAM(S): at 21:45

## 2020-08-19 RX ADMIN — Medication 50 MILLIGRAM(S): at 18:12

## 2020-08-19 RX ADMIN — ATORVASTATIN CALCIUM 80 MILLIGRAM(S): 80 TABLET, FILM COATED ORAL at 21:45

## 2020-08-19 RX ADMIN — HEPARIN SODIUM 5000 UNIT(S): 5000 INJECTION INTRAVENOUS; SUBCUTANEOUS at 16:32

## 2020-08-19 RX ADMIN — Medication 2: at 17:17

## 2020-08-19 RX ADMIN — Medication 1 TABLET(S): at 16:30

## 2020-08-19 RX ADMIN — GABAPENTIN 300 MILLIGRAM(S): 400 CAPSULE ORAL at 05:20

## 2020-08-19 RX ADMIN — INSULIN GLARGINE 8 UNIT(S): 100 INJECTION, SOLUTION SUBCUTANEOUS at 21:46

## 2020-08-19 NOTE — PROGRESS NOTE ADULT - SUBJECTIVE AND OBJECTIVE BOX
Vascular & Interventional Radiology Pre-Procedure Note    Procedure Name: Tunneled dialysis catheter removal.    HPI: 70y Female with tunneled dialysis catheter. Removal is requested as she has a functioning left AV fistula.     Allergies: ertapenem (Urticaria)  Purell (Rash)    Medications (Abx/Cardiac/Anticoagulation/Blood Products)  aspirin enteric coated: 81 milliGRAM(s) Oral (08-18 @ 11:37)  heparin   Injectable: 5000 Unit(s) SubCutaneous (08-19 @ 05:20)  heparin   Injectable: 5000 Unit(s) SubCutaneous (08-18 @ 05:37)  isosorbide   mononitrate ER Tablet (IMDUR): 60 milliGRAM(s) Oral (08-18 @ 11:37)  metoprolol tartrate: 50 milliGRAM(s) Oral (08-19 @ 05:20)  NIFEdipine XL: 60 milliGRAM(s) Oral (08-19 @ 05:20)    Data:    T(C): 36.4  HR: 78  BP: 137/83  RR: 18  SpO2: 96%    -WBC 8.88 / HgB 11.5 / Hct 37.9 / Plt 261  -Na 137 / Cl 102 / BUN 44 / Glucose 155  -K 5.3 / CO2 28 / Cr 4.30  -ALT -- / Alk Phos -- / T.Bili --  -INR1.21    Plan:   -70y Female presents for tunneled dialysis catheter removal. Spoke with referring nephrologist who indicates left arm dialysis access is working well for 2 weeks therefore he wants tunneled catheter removed. Spoke with patient through  phone and discussed procedure and risks of procedure and she agrees to continue.   -Risks/Benefits/alternatives explained with the patient and/or healthcare proxy and witnessed informed consent obtained.

## 2020-08-19 NOTE — PROGRESS NOTE ADULT - SUBJECTIVE AND OBJECTIVE BOX
Patient is a 70y old  Female who presents with a chief complaint of Non healing fracture of right tibia with pseudo joint formation (19 Aug 2020 14:52)      INTERVAL /OVERNIGHT EVENTS: non compliant with can walker boot    MEDICATIONS  (STANDING):  aspirin enteric coated 81 milliGRAM(s) Oral daily  atorvastatin 80 milliGRAM(s) Oral at bedtime  dextrose 5%. 1000 milliLiter(s) (50 mL/Hr) IV Continuous <Continuous>  dextrose 50% Injectable 12.5 Gram(s) IV Push once  dextrose 50% Injectable 25 Gram(s) IV Push once  dextrose 50% Injectable 25 Gram(s) IV Push once  gabapentin 300 milliGRAM(s) Oral two times a day  heparin   Injectable 5000 Unit(s) SubCutaneous every 8 hours  insulin glargine Injectable (LANTUS) 8 Unit(s) SubCutaneous at bedtime  insulin lispro (HumaLOG) corrective regimen sliding scale   SubCutaneous three times a day before meals  insulin lispro (HumaLOG) corrective regimen sliding scale   SubCutaneous at bedtime  isosorbide   mononitrate ER Tablet (IMDUR) 60 milliGRAM(s) Oral daily  melatonin 3 milliGRAM(s) Oral at bedtime  metoprolol tartrate 50 milliGRAM(s) Oral two times a day  multivitamin 1 Tablet(s) Oral daily  NIFEdipine XL 60 milliGRAM(s) Oral daily    MEDICATIONS  (PRN):  acetaminophen   Tablet .. 650 milliGRAM(s) Oral every 6 hours PRN Mild Pain (1 - 3)  dextrose 40% Gel 15 Gram(s) Oral once PRN Blood Glucose LESS THAN 70 milliGRAM(s)/deciLiter  glucagon  Injectable 1 milliGRAM(s) IntraMuscular once PRN Glucose <70 milliGRAM(s)/deciLiter  morphine  - Injectable 2 milliGRAM(s) IV Push every 6 hours PRN breakthrough pain  oxyCODONE    IR 5 milliGRAM(s) Oral every 4 hours PRN Severe Pain (7 - 10)  traMADol 25 milliGRAM(s) Oral every 4 hours PRN Moderate Pain (4 - 6)      Allergies    ertapenem (Urticaria)  Purell (Rash)    Intolerances        REVIEW OF SYSTEMS:  CONSTITUTIONAL: No fever, weight loss, or fatigue  EYES: No eye pain, visual disturbances, or discharge  ENMT:  No difficulty hearing, tinnitus, vertigo; No sinus or throat pain  NECK: No pain or stiffness  RESPIRATORY: No cough, wheezing, chills or hemoptysis; No shortness of breath  CARDIOVASCULAR: No chest pain, palpitations, dizziness, or leg swelling  GASTROINTESTINAL: No abdominal or epigastric pain. No nausea, vomiting, or hematemesis; No diarrhea or constipation. No melena or hematochezia.  GENITOURINARY: No dysuria, frequency, hematuria, or incontinence  NEUROLOGICAL: No headaches, memory loss, loss of strength, numbness, or tremors  SKIN: No itching, burning, rashes, or lesions   LYMPH NODES: No enlarged glands  ENDOCRINE: No heat or cold intolerance; No hair loss; No polydipsia or polyuria  MUSCULOSKELETAL: No joint pain or swelling; No muscle, back, or extremity pain  PSYCHIATRIC: No depression, anxiety, mood swings, or difficulty sleeping  HEME/LYMPH: No easy bruising, or bleeding gums  ALLERGY AND IMMUNOLOGIC: No hives or eczema    Vital Signs Last 24 Hrs  T(C): 36.4 (19 Aug 2020 15:30), Max: 36.7 (18 Aug 2020 20:39)  T(F): 97.5 (19 Aug 2020 15:30), Max: 98.1 (18 Aug 2020 20:39)  HR: 73 (19 Aug 2020 15:30) (67 - 78)  BP: 135/76 (19 Aug 2020 15:30) (124/71 - 152/73)  BP(mean): --  RR: 18 (19 Aug 2020 15:30) (18 - 20)  SpO2: 95% (19 Aug 2020 15:30) (94% - 98%)    PHYSICAL EXAM:  GENERAL: NAD, well-groomed, well-developed  HEAD:  Atraumatic, Normocephalic  EYES: EOMI, PERRLA, conjunctiva and sclera clear  ENMT: No tonsillar erythema, exudates, or enlargement; Moist mucous membranes, Good dentition, No lesions  NECK: Supple, No JVD, Normal thyroid  NERVOUS SYSTEM:  Alert & Oriented X3, Good concentration; Motor Strength 5/5 B/L upper and lower extremities; DTRs 2+ intact and symmetric  CHEST/LUNG: Clear to auscultation bilaterally; No rales, rhonchi, wheezing, or rubs  HEART: Regular rate and rhythm; No murmurs, rubs, or gallops  ABDOMEN: Soft, Nontender, Nondistended; Bowel sounds present  EXTREMITIES:  2+ Peripheral Pulses, No clubbing, cyanosis, or edema  LYMPH: No lymphadenopathy noted  SKIN: No rashes or lesions    LABS:                        11.5   8.88  )-----------( 261      ( 19 Aug 2020 11:35 )             37.9     19 Aug 2020 12:20    137    |  102    |  44     ----------------------------<  155    5.3     |  28     |  4.30     Ca    7.9        19 Aug 2020 12:20      PT/INR - ( 19 Aug 2020 11:35 )   PT: 14.0 sec;   INR: 1.21 ratio         PTT - ( 19 Aug 2020 11:35 )  PTT:31.5 sec    CAPILLARY BLOOD GLUCOSE      POCT Blood Glucose.: 208 mg/dL (19 Aug 2020 17:00)  POCT Blood Glucose.: 126 mg/dL (19 Aug 2020 11:51)  POCT Blood Glucose.: 93 mg/dL (19 Aug 2020 08:25)  POCT Blood Glucose.: 155 mg/dL (18 Aug 2020 21:29)      RADIOLOGY & ADDITIONAL TESTS:    Notes Reviewed:  [x ] YES  [ ] NO    Care Discussed with Consultants/Other Providers [x ] YES  [ ] NO

## 2020-08-19 NOTE — PROGRESS NOTE ADULT - SUBJECTIVE AND OBJECTIVE BOX
Interventional Radiology Brief Post Procedure Note    Procedure: Tunneled dialysis catheter removal.    Operators: Ariel Downing MD    Anesthesia (type): None.     Contrast: None.     EBL: Minimal.     Findings/Follow up Plan of Care: Successful tunneled dialysis catheter removal. The catheter was removed intact.    Specimens Removed: Right chest tunneled dialysis catheter.     Implants: None.     Complications: No immediate complications.     Condition/Disposition: To inpatient room.     Please call Interventional Radiology x [phone] with any questions, concerns, or issues.

## 2020-08-19 NOTE — PROGRESS NOTE ADULT - ASSESSMENT
1.	ESRD on HD  2.	Diabetes  3.	Hypertension  4.	h/o fall, ankle fracture, s/p closed reduction    HD today. To continue HD TIW as scheduled. Fluid removal as tolerated by BP.   Dietary and PO fluid restriction. Hold Epogen. Monitor blood sugar levels. Insulin coverage as needed. Dietary restriction.   Monitor BP trend. Titrate BP meds as needed. Salt restriction. Ortho follow up.

## 2020-08-19 NOTE — PROGRESS NOTE ADULT - SUBJECTIVE AND OBJECTIVE BOX
Patient is a 70y old  Female who presents with a chief complaint of Non healing fracture of right tibia with pseudo joint formation (17 Aug 2020 19:29)  Patient seen in follow up for ESRD on HD.     HD today.        PAST MEDICAL HISTORY:  H/O diabetic neuropathy  PAF (paroxysmal atrial fibrillation)  Chronic pancreatitis  Cholecystitis with cholangitis  Acute urinary retention  Liver abscess  ESRD (end stage renal disease) on dialysis  CAD (coronary artery disease)  Diabetes  CRF (chronic renal failure)  Hypertension  Pneumonia  Myocardial infarction  Hypertension  Diabetes      MEDICATIONS  (STANDING):  aspirin enteric coated 81 milliGRAM(s) Oral daily  atorvastatin 80 milliGRAM(s) Oral at bedtime  dextrose 5%. 1000 milliLiter(s) (50 mL/Hr) IV Continuous <Continuous>  dextrose 50% Injectable 12.5 Gram(s) IV Push once  dextrose 50% Injectable 25 Gram(s) IV Push once  dextrose 50% Injectable 25 Gram(s) IV Push once  gabapentin 300 milliGRAM(s) Oral two times a day  heparin   Injectable 5000 Unit(s) SubCutaneous every 8 hours  insulin glargine Injectable (LANTUS) 8 Unit(s) SubCutaneous at bedtime  insulin lispro (HumaLOG) corrective regimen sliding scale   SubCutaneous three times a day before meals  insulin lispro (HumaLOG) corrective regimen sliding scale   SubCutaneous at bedtime  isosorbide   mononitrate ER Tablet (IMDUR) 60 milliGRAM(s) Oral daily  melatonin 3 milliGRAM(s) Oral at bedtime  metoprolol tartrate 50 milliGRAM(s) Oral two times a day  multivitamin 1 Tablet(s) Oral daily  NIFEdipine XL 60 milliGRAM(s) Oral daily    MEDICATIONS  (PRN):  acetaminophen   Tablet .. 650 milliGRAM(s) Oral every 6 hours PRN Mild Pain (1 - 3)  dextrose 40% Gel 15 Gram(s) Oral once PRN Blood Glucose LESS THAN 70 milliGRAM(s)/deciLiter  glucagon  Injectable 1 milliGRAM(s) IntraMuscular once PRN Glucose <70 milliGRAM(s)/deciLiter  morphine  - Injectable 2 milliGRAM(s) IV Push every 6 hours PRN breakthrough pain  oxyCODONE    IR 5 milliGRAM(s) Oral every 4 hours PRN Severe Pain (7 - 10)  traMADol 25 milliGRAM(s) Oral every 4 hours PRN Moderate Pain (4 - 6)    T(C): 36.3 (08-19-20 @ 10:03), Max: 36.8 (08-17-20 @ 18:00)  HR: 67 (08-19-20 @ 10:03) (66 - 88)  BP: 139/- (08-19-20 @ 10:03) (121/65 - 193/84)  RR: 20 (08-19-20 @ 10:03)  SpO2: 97% (08-19-20 @ 10:03)  Wt(kg): --  I&O's Detail    18 Aug 2020 07:01  -  19 Aug 2020 07:00  --------------------------------------------------------  IN:    Oral Fluid: 720 mL  Total IN: 720 mL    OUT:  Total OUT: 0 mL    Total NET: 720 mL      PHYSICAL EXAM:  General: No distress  Respiratory: b/l air entry  Cardiovascular: S1 S2  Gastrointestinal: soft  Extremities:  edema, left AVF                           LABORATORY:                        11.5   8.88  )-----------( 261      ( 19 Aug 2020 11:35 )             37.9     08-19    137  |  102  |  44<H>  ----------------------------<  155<H>  5.3   |  28  |  4.30<H>    Ca    7.9<L>      19 Aug 2020 12:20    TPro  7.8  /  Alb  2.9<L>  /  TBili  0.4  /  DBili  x   /  AST  10<L>  /  ALT  13  /  AlkPhos  148<H>  08-17    Sodium, Serum: 137 mmol/L (08-19 @ 12:20)  Sodium, Serum: 135 mmol/L (08-18 @ 20:16)  Sodium, Serum: 135 mmol/L (08-17 @ 16:24)    Potassium, Serum: 5.3 mmol/L (08-19 @ 12:20)  Potassium, Serum: 6.0 mmol/L (08-18 @ 20:16)  Potassium, Serum: 6.7 mmol/L (08-17 @ 16:24)    Hemoglobin: 11.5 g/dL (08-19 @ 11:35)  Hemoglobin: 12.4 g/dL (08-18 @ 13:57)  Hemoglobin: 12.1 g/dL (08-17 @ 16:24)    Creatinine, Serum 4.30 (08-19 @ 12:20)  Creatinine, Serum 5.10 (08-18 @ 20:16)  Creatinine, Serum 7.00 (08-17 @ 16:24)        LIVER FUNCTIONS - ( 17 Aug 2020 16:24 )  Alb: 2.9 g/dL / Pro: 7.8 g/dL / ALK PHOS: 148 U/L / ALT: 13 U/L / AST: 10 U/L / GGT: x

## 2020-08-20 ENCOUNTER — TRANSCRIPTION ENCOUNTER (OUTPATIENT)
Age: 70
End: 2020-08-20

## 2020-08-20 RX ORDER — INSULIN GLARGINE 100 [IU]/ML
12 INJECTION, SOLUTION SUBCUTANEOUS AT BEDTIME
Refills: 0 | Status: DISCONTINUED | OUTPATIENT
Start: 2020-08-20 | End: 2020-08-21

## 2020-08-20 RX ADMIN — Medication 1 TABLET(S): at 11:41

## 2020-08-20 RX ADMIN — Medication 1: at 21:27

## 2020-08-20 RX ADMIN — ATORVASTATIN CALCIUM 80 MILLIGRAM(S): 80 TABLET, FILM COATED ORAL at 21:27

## 2020-08-20 RX ADMIN — Medication 3 MILLIGRAM(S): at 21:27

## 2020-08-20 RX ADMIN — Medication 1: at 08:15

## 2020-08-20 RX ADMIN — GABAPENTIN 300 MILLIGRAM(S): 400 CAPSULE ORAL at 17:23

## 2020-08-20 RX ADMIN — Medication 2: at 12:31

## 2020-08-20 RX ADMIN — ISOSORBIDE MONONITRATE 60 MILLIGRAM(S): 60 TABLET, EXTENDED RELEASE ORAL at 11:41

## 2020-08-20 RX ADMIN — INSULIN GLARGINE 12 UNIT(S): 100 INJECTION, SOLUTION SUBCUTANEOUS at 21:27

## 2020-08-20 RX ADMIN — Medication 81 MILLIGRAM(S): at 11:41

## 2020-08-20 RX ADMIN — HEPARIN SODIUM 5000 UNIT(S): 5000 INJECTION INTRAVENOUS; SUBCUTANEOUS at 13:22

## 2020-08-20 RX ADMIN — GABAPENTIN 300 MILLIGRAM(S): 400 CAPSULE ORAL at 05:47

## 2020-08-20 RX ADMIN — Medication 50 MILLIGRAM(S): at 17:23

## 2020-08-20 NOTE — PROGRESS NOTE ADULT - ASSESSMENT
1.	ESRD on HD  2.	Diabetes  3.	Hypertension  4.	h/o fall, ankle fracture, s/p closed reduction    HD tomorrow. s/p permacath removal. To continue HD TIW as scheduled. Fluid removal as tolerated by BP.   Dietary and PO fluid restriction. Hold Epogen. Monitor blood sugar levels. Insulin coverage as needed. Dietary restriction.   Monitor BP trend. Titrate BP meds as needed. Salt restriction. Ortho follow up.

## 2020-08-20 NOTE — PROGRESS NOTE ADULT - SUBJECTIVE AND OBJECTIVE BOX
Patient is a 70y old  Female who presents with a chief complaint of Non healing fracture of right tibia with pseudo joint formation (20 Aug 2020 11:15)      INTERVAL /OVERNIGHT EVENTS: resting comfortably    MEDICATIONS  (STANDING):  aspirin enteric coated 81 milliGRAM(s) Oral daily  atorvastatin 80 milliGRAM(s) Oral at bedtime  dextrose 5%. 1000 milliLiter(s) (50 mL/Hr) IV Continuous <Continuous>  dextrose 50% Injectable 12.5 Gram(s) IV Push once  dextrose 50% Injectable 25 Gram(s) IV Push once  dextrose 50% Injectable 25 Gram(s) IV Push once  gabapentin 300 milliGRAM(s) Oral two times a day  heparin   Injectable 5000 Unit(s) SubCutaneous every 8 hours  insulin glargine Injectable (LANTUS) 8 Unit(s) SubCutaneous at bedtime  insulin lispro (HumaLOG) corrective regimen sliding scale   SubCutaneous three times a day before meals  insulin lispro (HumaLOG) corrective regimen sliding scale   SubCutaneous at bedtime  isosorbide   mononitrate ER Tablet (IMDUR) 60 milliGRAM(s) Oral daily  melatonin 3 milliGRAM(s) Oral at bedtime  metoprolol tartrate 50 milliGRAM(s) Oral two times a day  multivitamin 1 Tablet(s) Oral daily  NIFEdipine XL 60 milliGRAM(s) Oral daily    MEDICATIONS  (PRN):  acetaminophen   Tablet .. 650 milliGRAM(s) Oral every 6 hours PRN Mild Pain (1 - 3)  dextrose 40% Gel 15 Gram(s) Oral once PRN Blood Glucose LESS THAN 70 milliGRAM(s)/deciLiter  glucagon  Injectable 1 milliGRAM(s) IntraMuscular once PRN Glucose <70 milliGRAM(s)/deciLiter  morphine  - Injectable 2 milliGRAM(s) IV Push every 6 hours PRN breakthrough pain  oxyCODONE    IR 5 milliGRAM(s) Oral every 4 hours PRN Severe Pain (7 - 10)  traMADol 25 milliGRAM(s) Oral every 4 hours PRN Moderate Pain (4 - 6)      Allergies    ertapenem (Urticaria)  Purell (Rash)    Intolerances        REVIEW OF SYSTEMS:  CONSTITUTIONAL: No fever, weight loss, or fatigue  EYES: No eye pain, visual disturbances, or discharge  ENMT:  No difficulty hearing, tinnitus, vertigo; No sinus or throat pain  NECK: No pain or stiffness  RESPIRATORY: No cough, wheezing, chills or hemoptysis; No shortness of breath  CARDIOVASCULAR: No chest pain, palpitations, dizziness, or leg swelling  GASTROINTESTINAL: No abdominal or epigastric pain. No nausea, vomiting, or hematemesis; No diarrhea or constipation. No melena or hematochezia.  GENITOURINARY: No dysuria, frequency, hematuria, or incontinence  NEUROLOGICAL: No headaches, memory loss, loss of strength, numbness, or tremors  SKIN: No itching, burning, rashes, or lesions   LYMPH NODES: No enlarged glands  ENDOCRINE: No heat or cold intolerance; No hair loss; No polydipsia or polyuria  MUSCULOSKELETAL: + joint pain or swelling; No muscle, back, or extremity pain  PSYCHIATRIC: No depression, anxiety, mood swings, or difficulty sleeping  HEME/LYMPH: No easy bruising, or bleeding gums  ALLERGY AND IMMUNOLOGIC: No hives or eczema    Vital Signs Last 24 Hrs  T(C): 36.7 (20 Aug 2020 14:58), Max: 37.1 (19 Aug 2020 20:30)  T(F): 98.1 (20 Aug 2020 14:58), Max: 98.8 (19 Aug 2020 20:30)  HR: 72 (20 Aug 2020 17:24) (64 - 74)  BP: 172/77 (20 Aug 2020 17:24) (109/67 - 172/77)  BP(mean): --  RR: 17 (20 Aug 2020 17:24) (17 - 18)  SpO2: 94% (20 Aug 2020 17:24) (92% - 97%)    PHYSICAL EXAM:  GENERAL: NAD, well-groomed, well-developed  HEAD:  Atraumatic, Normocephalic  EYES: EOMI, PERRLA, conjunctiva and sclera clear  ENMT: No tonsillar erythema, exudates, or enlargement; Moist mucous membranes, Good dentition, No lesions  NECK: Supple, No JVD, Normal thyroid  NERVOUS SYSTEM:  Alert & Oriented X3, Good concentration; Motor Strength 5/5 B/L upper and lower extremities; DTRs 2+ intact and symmetric  CHEST/LUNG: Clear to auscultation bilaterally; No rales, rhonchi, wheezing, or rubs  HEART: Regular rate and rhythm; No murmurs, rubs, or gallops  ABDOMEN: Soft, Nontender, Nondistended; Bowel sounds present  EXTREMITIES:  2+ Peripheral Pulses, No clubbing, cyanosis, or edema  LYMPH: No lymphadenopathy noted  SKIN: No rashes or lesions    LABS:      Ca    7.9        19 Aug 2020 12:20      PT/INR - ( 19 Aug 2020 11:35 )   PT: 14.0 sec;   INR: 1.21 ratio         PTT - ( 19 Aug 2020 11:35 )  PTT:31.5 sec    CAPILLARY BLOOD GLUCOSE      POCT Blood Glucose.: 136 mg/dL (20 Aug 2020 17:16)  POCT Blood Glucose.: 209 mg/dL (20 Aug 2020 12:28)  POCT Blood Glucose.: 187 mg/dL (20 Aug 2020 08:09)  POCT Blood Glucose.: 281 mg/dL (19 Aug 2020 21:15)      RADIOLOGY & ADDITIONAL TESTS:    Notes Reviewed:  [x ] YES  [ ] NO    Care Discussed with Consultants/Other Providers [x ] YES  [ ] NO

## 2020-08-20 NOTE — CONSULT NOTE ADULT - SUBJECTIVE AND OBJECTIVE BOX
Patient is a 70y old  Female who presents with a chief complaint of Non healing fracture of right tibia with pseudo joint formation (20 Aug 2020 18:33)      Reason For Consult: dm2 uncontrolled    HPI:  70 y female as per daughter at bedside, patient has been having right ankle pain with ROM and on walking x 2 days,  states patient uses a walker , does not walk often, this past weekend, visited family was walking with walker.  denies fall, twisting ankle, or any trauma.  daughter states she has been giving her tylenol for pain.  patient also takes gabapentin.  states patient has hx of fracture of her right ankle 4 mos ago, was casted, went to rehab, has been home for 1 month.  does not recall name of orthopedic. (17 Aug 2020 19:29)      PAST MEDICAL & SURGICAL HISTORY:  H/O diabetic neuropathy  PAF (paroxysmal atrial fibrillation): 1 episode in 11/2018 while hospitalized for acute cholecystitis  Chronic pancreatitis  Cholecystitis with cholangitis  Acute urinary retention  Liver abscess: resolved  ESRD (end stage renal disease) on dialysis: MWF since 05/2018  CAD (coronary artery disease): s/p stent x 1 in 2007  Diabetes: Type 2 DM  Myocardial infarction: 2007  Hypertension  Cataract: IOL b/l  S/P arteriovenous (AV) fistula creation: 05/17/2019  History of biliary stent insertion: 11/2018 &amp; removal 7/2/19  H/O: hysterectomy: 1990      FAMILY HISTORY:  No pertinent family history in first degree relatives        Social History:    MEDICATIONS  (STANDING):  aspirin enteric coated 81 milliGRAM(s) Oral daily  atorvastatin 80 milliGRAM(s) Oral at bedtime  dextrose 5%. 1000 milliLiter(s) (50 mL/Hr) IV Continuous <Continuous>  dextrose 50% Injectable 12.5 Gram(s) IV Push once  dextrose 50% Injectable 25 Gram(s) IV Push once  dextrose 50% Injectable 25 Gram(s) IV Push once  gabapentin 300 milliGRAM(s) Oral two times a day  heparin   Injectable 5000 Unit(s) SubCutaneous every 8 hours  insulin glargine Injectable (LANTUS) 8 Unit(s) SubCutaneous at bedtime  insulin lispro (HumaLOG) corrective regimen sliding scale   SubCutaneous three times a day before meals  insulin lispro (HumaLOG) corrective regimen sliding scale   SubCutaneous at bedtime  isosorbide   mononitrate ER Tablet (IMDUR) 60 milliGRAM(s) Oral daily  melatonin 3 milliGRAM(s) Oral at bedtime  metoprolol tartrate 50 milliGRAM(s) Oral two times a day  multivitamin 1 Tablet(s) Oral daily  NIFEdipine XL 60 milliGRAM(s) Oral daily    MEDICATIONS  (PRN):  acetaminophen   Tablet .. 650 milliGRAM(s) Oral every 6 hours PRN Mild Pain (1 - 3)  dextrose 40% Gel 15 Gram(s) Oral once PRN Blood Glucose LESS THAN 70 milliGRAM(s)/deciLiter  glucagon  Injectable 1 milliGRAM(s) IntraMuscular once PRN Glucose <70 milliGRAM(s)/deciLiter  morphine  - Injectable 2 milliGRAM(s) IV Push every 6 hours PRN breakthrough pain  oxyCODONE    IR 5 milliGRAM(s) Oral every 4 hours PRN Severe Pain (7 - 10)  traMADol 25 milliGRAM(s) Oral every 4 hours PRN Moderate Pain (4 - 6)        T(C): 36.7 (08-20-20 @ 14:58), Max: 37.1 (08-19-20 @ 20:30)  HR: 72 (08-20-20 @ 17:24) (64 - 74)  BP: 172/77 (08-20-20 @ 17:24) (109/67 - 172/77)  RR: 17 (08-20-20 @ 17:24) (17 - 18)  SpO2: 94% (08-20-20 @ 17:24) (92% - 97%)  Wt(kg): --    PHYSICAL EXAM:  GENERAL: NAD, well-groomed, well-developed  HEAD:  Atraumatic, Normocephalic  NECK: Supple, No JVD, Normal thyroid  CHEST/LUNG: Clear to percussion bilaterally; No rales, rhonchi, wheezing, or rubs  HEART: Regular rate and rhythm; No murmurs, rubs, or gallops  ABDOMEN: Soft, Nontender, Nondistended; Bowel sounds present  EXTREMITIES: le dsg intact    CAPILLARY BLOOD GLUCOSE      POCT Blood Glucose.: 136 mg/dL (20 Aug 2020 17:16)  POCT Blood Glucose.: 209 mg/dL (20 Aug 2020 12:28)  POCT Blood Glucose.: 187 mg/dL (20 Aug 2020 08:09)  POCT Blood Glucose.: 281 mg/dL (19 Aug 2020 21:15)                            11.5   8.88  )-----------( 261      ( 19 Aug 2020 11:35 )             37.9       CMP:  08-19 @ 12:20  SGPT --  Albumin --   Alk Phos --   Anion Gap 7   SGOT --   Total Bili --   BUN 44   Calcium Total 7.9   CO2 28   Chloride 102   Creatinine 4.30   eGFR if AA 11   eGFR if non AA 10   Glucose 155   Potassium 5.3   Protein --   Sodium 137      Thyroid Function Tests:      Diabetes Tests:       Radiology:

## 2020-08-20 NOTE — PROGRESS NOTE ADULT - SUBJECTIVE AND OBJECTIVE BOX
Patient is a 70y old  Female who presents with a chief complaint of Non healing fracture of right tibia with pseudo joint formation (17 Aug 2020 19:29)  Patient seen in follow up for ESRD on HD.     Permacath removed       PAST MEDICAL HISTORY:  H/O diabetic neuropathy  PAF (paroxysmal atrial fibrillation)  Chronic pancreatitis  Cholecystitis with cholangitis  Acute urinary retention  Liver abscess  ESRD (end stage renal disease) on dialysis  CAD (coronary artery disease)  Diabetes  CRF (chronic renal failure)  Hypertension  Pneumonia  Myocardial infarction  Hypertension  Diabetes      MEDICATIONS  (STANDING):  aspirin enteric coated 81 milliGRAM(s) Oral daily  atorvastatin 80 milliGRAM(s) Oral at bedtime  dextrose 5%. 1000 milliLiter(s) (50 mL/Hr) IV Continuous <Continuous>  dextrose 50% Injectable 12.5 Gram(s) IV Push once  dextrose 50% Injectable 25 Gram(s) IV Push once  dextrose 50% Injectable 25 Gram(s) IV Push once  gabapentin 300 milliGRAM(s) Oral two times a day  heparin   Injectable 5000 Unit(s) SubCutaneous every 8 hours  insulin glargine Injectable (LANTUS) 8 Unit(s) SubCutaneous at bedtime  insulin lispro (HumaLOG) corrective regimen sliding scale   SubCutaneous three times a day before meals  insulin lispro (HumaLOG) corrective regimen sliding scale   SubCutaneous at bedtime  isosorbide   mononitrate ER Tablet (IMDUR) 60 milliGRAM(s) Oral daily  melatonin 3 milliGRAM(s) Oral at bedtime  metoprolol tartrate 50 milliGRAM(s) Oral two times a day  multivitamin 1 Tablet(s) Oral daily  NIFEdipine XL 60 milliGRAM(s) Oral daily    MEDICATIONS  (PRN):  acetaminophen   Tablet .. 650 milliGRAM(s) Oral every 6 hours PRN Mild Pain (1 - 3)  dextrose 40% Gel 15 Gram(s) Oral once PRN Blood Glucose LESS THAN 70 milliGRAM(s)/deciLiter  glucagon  Injectable 1 milliGRAM(s) IntraMuscular once PRN Glucose <70 milliGRAM(s)/deciLiter  morphine  - Injectable 2 milliGRAM(s) IV Push every 6 hours PRN breakthrough pain  oxyCODONE    IR 5 milliGRAM(s) Oral every 4 hours PRN Severe Pain (7 - 10)  traMADol 25 milliGRAM(s) Oral every 4 hours PRN Moderate Pain (4 - 6)    T(C): 36.4 (08-20-20 @ 05:27), Max: 37.1 (08-19-20 @ 20:30)  HR: 64 (08-20-20 @ 05:27) (64 - 78)  BP: 109/67 (08-20-20 @ 05:27) (109/67 - 152/73)  RR: 18 (08-20-20 @ 05:27)  SpO2: 96% (08-20-20 @ 05:27)  Wt(kg): --  I&O's Detail    19 Aug 2020 07:01  -  20 Aug 2020 07:00  --------------------------------------------------------  IN:  Total IN: 0 mL    OUT:    Other: 2600 mL  Total OUT: 2600 mL    Total NET: -2600 mL          PHYSICAL EXAM:  General: No distress  Respiratory: b/l air entry  Cardiovascular: S1 S2  Gastrointestinal: soft  Extremities:  edema, left AVF                      LABORATORY:                        11.5   8.88  )-----------( 261      ( 19 Aug 2020 11:35 )             37.9     08-19    137  |  102  |  44<H>  ----------------------------<  155<H>  5.3   |  28  |  4.30<H>    Ca    7.9<L>      19 Aug 2020 12:20      Sodium, Serum: 137 mmol/L (08-19 @ 12:20)  Sodium, Serum: 135 mmol/L (08-18 @ 20:16)    Potassium, Serum: 5.3 mmol/L (08-19 @ 12:20)  Potassium, Serum: 6.0 mmol/L (08-18 @ 20:16)    Hemoglobin: 11.5 g/dL (08-19 @ 11:35)  Hemoglobin: 12.4 g/dL (08-18 @ 13:57)  Hemoglobin: 12.1 g/dL (08-17 @ 16:24)    Creatinine, Serum 4.30 (08-19 @ 12:20)  Creatinine, Serum 5.10 (08-18 @ 20:16)  Creatinine, Serum 7.00 (08-17 @ 16:24)

## 2020-08-20 NOTE — DISCHARGE NOTE NURSING/CASE MANAGEMENT/SOCIAL WORK - PATIENT PORTAL LINK FT
You can access the FollowMyHealth Patient Portal offered by [facility] by registering at the following website: [email or web address]. By joining [employer]’s FollowMyHealth portal, you will also be able to view your health information using other applications (apps) compatible with our system.

## 2020-08-20 NOTE — CONSULT NOTE ADULT - PROBLEM SELECTOR RECOMMENDATION 9
increase lantus 12 units qhs  cont low dose humalog scale coverage qac/qhs  cont cons cho diet  goal bg 100-180 in hosp setting

## 2020-08-21 ENCOUNTER — TRANSCRIPTION ENCOUNTER (OUTPATIENT)
Age: 70
End: 2020-08-21

## 2020-08-21 VITALS
HEART RATE: 73 BPM | DIASTOLIC BLOOD PRESSURE: 54 MMHG | RESPIRATION RATE: 17 BRPM | SYSTOLIC BLOOD PRESSURE: 101 MMHG | OXYGEN SATURATION: 93 % | TEMPERATURE: 98 F

## 2020-08-21 LAB
ANION GAP SERPL CALC-SCNC: 7 MMOL/L — SIGNIFICANT CHANGE UP (ref 5–17)
BUN SERPL-MCNC: 67 MG/DL — HIGH (ref 7–23)
CALCIUM SERPL-MCNC: 7.9 MG/DL — LOW (ref 8.5–10.1)
CHLORIDE SERPL-SCNC: 100 MMOL/L — SIGNIFICANT CHANGE UP (ref 96–108)
CO2 SERPL-SCNC: 26 MMOL/L — SIGNIFICANT CHANGE UP (ref 22–31)
CREAT SERPL-MCNC: 5.4 MG/DL — HIGH (ref 0.5–1.3)
GLUCOSE SERPL-MCNC: 203 MG/DL — HIGH (ref 70–99)
HCT VFR BLD CALC: 36.9 % — SIGNIFICANT CHANGE UP (ref 34.5–45)
HGB BLD-MCNC: 11.4 G/DL — LOW (ref 11.5–15.5)
MCHC RBC-ENTMCNC: 28.9 PG — SIGNIFICANT CHANGE UP (ref 27–34)
MCHC RBC-ENTMCNC: 30.9 GM/DL — LOW (ref 32–36)
MCV RBC AUTO: 93.4 FL — SIGNIFICANT CHANGE UP (ref 80–100)
NRBC # BLD: 0 /100 WBCS — SIGNIFICANT CHANGE UP (ref 0–0)
PLATELET # BLD AUTO: 251 K/UL — SIGNIFICANT CHANGE UP (ref 150–400)
POTASSIUM SERPL-MCNC: 5.6 MMOL/L — HIGH (ref 3.5–5.3)
POTASSIUM SERPL-SCNC: 5.6 MMOL/L — HIGH (ref 3.5–5.3)
RBC # BLD: 3.95 M/UL — SIGNIFICANT CHANGE UP (ref 3.8–5.2)
RBC # FLD: 13.8 % — SIGNIFICANT CHANGE UP (ref 10.3–14.5)
SODIUM SERPL-SCNC: 133 MMOL/L — LOW (ref 135–145)
WBC # BLD: 9.02 K/UL — SIGNIFICANT CHANGE UP (ref 3.8–10.5)
WBC # FLD AUTO: 9.02 K/UL — SIGNIFICANT CHANGE UP (ref 3.8–10.5)

## 2020-08-21 PROCEDURE — 82962 GLUCOSE BLOOD TEST: CPT

## 2020-08-21 PROCEDURE — 97530 THERAPEUTIC ACTIVITIES: CPT

## 2020-08-21 PROCEDURE — 87340 HEPATITIS B SURFACE AG IA: CPT

## 2020-08-21 PROCEDURE — 80048 BASIC METABOLIC PNL TOTAL CA: CPT

## 2020-08-21 PROCEDURE — 71045 X-RAY EXAM CHEST 1 VIEW: CPT

## 2020-08-21 PROCEDURE — 36415 COLL VENOUS BLD VENIPUNCTURE: CPT

## 2020-08-21 PROCEDURE — 99261: CPT

## 2020-08-21 PROCEDURE — 73590 X-RAY EXAM OF LOWER LEG: CPT

## 2020-08-21 PROCEDURE — 36589 REMOVAL TUNNELED CV CATH: CPT

## 2020-08-21 PROCEDURE — 86704 HEP B CORE ANTIBODY TOTAL: CPT

## 2020-08-21 PROCEDURE — 86803 HEPATITIS C AB TEST: CPT

## 2020-08-21 PROCEDURE — 97166 OT EVAL MOD COMPLEX 45 MIN: CPT

## 2020-08-21 PROCEDURE — 93971 EXTREMITY STUDY: CPT

## 2020-08-21 PROCEDURE — 87635 SARS-COV-2 COVID-19 AMP PRB: CPT

## 2020-08-21 PROCEDURE — 93005 ELECTROCARDIOGRAM TRACING: CPT

## 2020-08-21 PROCEDURE — 97116 GAIT TRAINING THERAPY: CPT

## 2020-08-21 PROCEDURE — 85730 THROMBOPLASTIN TIME PARTIAL: CPT

## 2020-08-21 PROCEDURE — 83036 HEMOGLOBIN GLYCOSYLATED A1C: CPT

## 2020-08-21 PROCEDURE — 73562 X-RAY EXAM OF KNEE 3: CPT

## 2020-08-21 PROCEDURE — 97162 PT EVAL MOD COMPLEX 30 MIN: CPT

## 2020-08-21 PROCEDURE — 85027 COMPLETE CBC AUTOMATED: CPT

## 2020-08-21 PROCEDURE — 80053 COMPREHEN METABOLIC PANEL: CPT

## 2020-08-21 PROCEDURE — 86706 HEP B SURFACE ANTIBODY: CPT

## 2020-08-21 PROCEDURE — 86769 SARS-COV-2 COVID-19 ANTIBODY: CPT

## 2020-08-21 PROCEDURE — 99285 EMERGENCY DEPT VISIT HI MDM: CPT | Mod: 25

## 2020-08-21 PROCEDURE — 73610 X-RAY EXAM OF ANKLE: CPT

## 2020-08-21 PROCEDURE — 85610 PROTHROMBIN TIME: CPT

## 2020-08-21 RX ORDER — ACETAMINOPHEN 500 MG
2 TABLET ORAL
Qty: 0 | Refills: 0 | DISCHARGE
Start: 2020-08-21

## 2020-08-21 RX ADMIN — ISOSORBIDE MONONITRATE 60 MILLIGRAM(S): 60 TABLET, EXTENDED RELEASE ORAL at 13:27

## 2020-08-21 RX ADMIN — Medication 81 MILLIGRAM(S): at 13:27

## 2020-08-21 RX ADMIN — OXYCODONE HYDROCHLORIDE 5 MILLIGRAM(S): 5 TABLET ORAL at 08:34

## 2020-08-21 RX ADMIN — Medication 50 MILLIGRAM(S): at 05:18

## 2020-08-21 RX ADMIN — Medication 1 TABLET(S): at 13:27

## 2020-08-21 RX ADMIN — OXYCODONE HYDROCHLORIDE 5 MILLIGRAM(S): 5 TABLET ORAL at 13:08

## 2020-08-21 RX ADMIN — Medication 60 MILLIGRAM(S): at 05:18

## 2020-08-21 RX ADMIN — GABAPENTIN 300 MILLIGRAM(S): 400 CAPSULE ORAL at 05:18

## 2020-08-21 NOTE — CHART NOTE - NSCHARTNOTEFT_GEN_A_CORE
Do you have Advance Directives (HCP / LV / Organ donation / Documentation of oral advance Directive):   ( x   )  yes    (      )    NO                                                                            Do you have LV - Living will :                                                                                                                                             (    )  yes    (  x    )   No    Do you have HCP - Health Care Proxy:                                                                                                                            ( x    )  yes   (       ) N0    Do you have DNR- Do Not Resuscitate :                                                                                                                           (      )  yes  (    x    )  No    Do you have DNI- Do Not intubate  :                                                                                                                               (      )  yes   (   x    ) No    Do you have MOLST - Medical orders for Life sustaining treatment  :                                                                    (      ) yes    (     x  ) No    Decision Maker :  (  x   ) Patient     (      )  HCA   (     ) Public Health Law Surrogate     (      ) Surrogate  (       ) Guardian    Goals of Care :  (   x   )   Complete Care     (       ) No Limitations                              (       )   Comfort Care       (       )  Hospice                               (      )   Limited medical Intervention / s    Medical Interventions :   (    x    )   CPR       (        )  DNR                                               (   x     )  Intubation with MV - Mechanical Ventilation  (   x   ) BIPAP/CPAP    (         )   DNI                                               (     x    )  Artificial Nutrition -  IVF, TPN / PPN, Tube Feeds             (         )   No Feeding Tube                                                (   x     ) Use Antibiotics                         (          ) No Antibiotics                                                (   x      ) Blood and Blood Products     (         )   No Blood or Blood products                                                (     x     )  Dialysis                                    (         )  No Dialysis                                                (          )  Medical Management only  (         )  No Invasive Interventions or Surgery  Time spent :                        (       ) upto 30 minutes                       (    x       )   more than 30 minutes  ACP and GOC reviewed and discussed

## 2020-08-21 NOTE — DISCHARGE NOTE PROVIDER - NSDCHHNEEDSERVICE_GEN_ALL_CORE
Teaching and training/Medication teaching and assessment/Rehabilitation services/Observation and assessment

## 2020-08-21 NOTE — DISCHARGE NOTE PROVIDER - NSDCCPCAREPLAN_GEN_ALL_CORE_FT
PRINCIPAL DISCHARGE DIAGNOSIS  Diagnosis: Tibia fracture  Assessment and Plan of Treatment: follow up with ortho Dr. ALEJANDRO      SECONDARY DISCHARGE DIAGNOSES  Diagnosis: Hyperkalemia  Assessment and Plan of Treatment:     Diagnosis: Right ankle pain, unspecified chronicity  Assessment and Plan of Treatment:

## 2020-08-21 NOTE — DISCHARGE NOTE PROVIDER - NSDCMRMEDTOKEN_GEN_ALL_CORE_FT
acetaminophen 325 mg oral tablet: 2 tab(s) orally every 6 hours, As needed, Mild Pain (1 - 3)  aspirin 81 mg oral delayed release tablet: 1 tab(s) orally once a day  atorvastatin 80 mg oral tablet: 1 tab(s) orally once a day (at bedtime)  Basaglar KwikPen 100 units/mL subcutaneous solution: 20 unit(s) subcutaneous once a day (at bedtime)    *As per patient and daughter in law, she takes 10units in the morning and then 10 units at night.  gabapentin 300 mg oral capsule: 1 cap(s) orally 2 times a day  isosorbide mononitrate 60 mg oral tablet, extended release: 1 tab(s) orally once a day  Lokelma 5 g oral powder for reconstitution: Take orally three times a week on Tues, Thurs, and Sat.  metoprolol tartrate 50 mg oral tablet: 1 tab(s) orally 2 times a day  Nephro-Jose Daniel oral tablet: 0.8 mg 1 tab(s) orally once a day  NIFEdipine 60 mg oral tablet, extended release: 1 tab(s) orally once a day AM  NovoLOG 100 units/mL injectable solution: 3 unit(s) injectable 3 times a day  Renvela 800 mg oral tablet: 1 tab(s) orally 3 times a day (with meals)

## 2020-08-21 NOTE — PROGRESS NOTE ADULT - REASON FOR ADMISSION
Non healing fracture of right tibia with pseudo joint formation

## 2020-08-21 NOTE — DISCHARGE NOTE PROVIDER - CARE PROVIDER_API CALL
Ariel Joe  ORTHOPAEDIC SURGERY  [address] [place] [address]  Phone: [phone]  Fax: [phone]  Follow Up Time:     Jaden Albarran  [facility]  [address], [address]  [place] [address]  Phone: [phone]  Fax: [phone]  Follow Up Time:     Kristy Sotomayor  INTERNAL MEDICINE  [address] [place] [address]  Phone: [phone]  Fax: [phone]  Follow Up Time:

## 2020-08-21 NOTE — DISCHARGE NOTE PROVIDER - PROVIDER TOKENS
PROVIDER:[TOKEN:[[id number]]],PROVIDER:[TOKEN:[[id number]]],PROVIDER:[TOKEN:[[id number]]]

## 2020-08-21 NOTE — PROVIDER CONTACT NOTE (OTHER) - ASSESSMENT
Blood glucose controlled with  present regime  daughter  stated no  diabetes discharge needs patient  has all needed supplies

## 2020-08-21 NOTE — PROVIDER CONTACT NOTE (OTHER) - RECOMMENDATIONS
change order to d/c home
follow up with Dr Ochoa  monitor glucose trends at home may require insulin adjustment

## 2020-08-21 NOTE — PROGRESS NOTE ADULT - ASSESSMENT
1.	ESRD on HD  2.	Diabetes  3.	Hypertension  4.	h/o fall, ankle fracture, s/p closed reduction    HD today. s/p permacath removal. To continue HD TIW as scheduled. Fluid removal as tolerated by BP.   Dietary and PO fluid restriction. Hold Epogen. Monitor blood sugar levels. Insulin coverage as needed. Dietary restriction.   Monitor BP trend. Titrate BP meds as needed. Salt restriction. Ortho follow up. D/c planning.

## 2020-08-21 NOTE — PROGRESS NOTE ADULT - SUBJECTIVE AND OBJECTIVE BOX
Patient is a 70y old  Female who presents with a chief complaint of Non healing fracture of right tibia with pseudo joint formation (17 Aug 2020 19:29)  Patient seen in follow up for ESRD on HD.        PAST MEDICAL HISTORY:  H/O diabetic neuropathy  PAF (paroxysmal atrial fibrillation)  Chronic pancreatitis  Cholecystitis with cholangitis  Acute urinary retention  Liver abscess  ESRD (end stage renal disease) on dialysis  CAD (coronary artery disease)  Diabetes  CRF (chronic renal failure)  Hypertension  Pneumonia  Myocardial infarction  Hypertension  Diabetes      MEDICATIONS  (STANDING):  aspirin enteric coated 81 milliGRAM(s) Oral daily  atorvastatin 80 milliGRAM(s) Oral at bedtime  dextrose 5%. 1000 milliLiter(s) (50 mL/Hr) IV Continuous <Continuous>  dextrose 50% Injectable 12.5 Gram(s) IV Push once  dextrose 50% Injectable 25 Gram(s) IV Push once  dextrose 50% Injectable 25 Gram(s) IV Push once  gabapentin 300 milliGRAM(s) Oral two times a day  heparin   Injectable 5000 Unit(s) SubCutaneous every 8 hours  insulin glargine Injectable (LANTUS) 12 Unit(s) SubCutaneous at bedtime  insulin lispro (HumaLOG) corrective regimen sliding scale   SubCutaneous three times a day before meals  insulin lispro (HumaLOG) corrective regimen sliding scale   SubCutaneous at bedtime  isosorbide   mononitrate ER Tablet (IMDUR) 60 milliGRAM(s) Oral daily  melatonin 3 milliGRAM(s) Oral at bedtime  metoprolol tartrate 50 milliGRAM(s) Oral two times a day  multivitamin 1 Tablet(s) Oral daily  NIFEdipine XL 60 milliGRAM(s) Oral daily    MEDICATIONS  (PRN):  acetaminophen   Tablet .. 650 milliGRAM(s) Oral every 6 hours PRN Mild Pain (1 - 3)  dextrose 40% Gel 15 Gram(s) Oral once PRN Blood Glucose LESS THAN 70 milliGRAM(s)/deciLiter  glucagon  Injectable 1 milliGRAM(s) IntraMuscular once PRN Glucose <70 milliGRAM(s)/deciLiter  morphine  - Injectable 2 milliGRAM(s) IV Push every 6 hours PRN breakthrough pain  oxyCODONE    IR 5 milliGRAM(s) Oral every 4 hours PRN Severe Pain (7 - 10)  traMADol 25 milliGRAM(s) Oral every 4 hours PRN Moderate Pain (4 - 6)    T(C): 36.4 (08-21-20 @ 13:05), Max: 37.1 (08-19-20 @ 20:30)  HR: 76 (08-21-20 @ 13:05) (64 - 78)  BP: 130/66 (08-21-20 @ 13:05) (109/67 - 172/77)  RR: 18 (08-21-20 @ 13:05)  SpO2: 99% (08-21-20 @ 13:05)  Wt(kg): --  I&O's Detail    21 Aug 2020 07:01  -  21 Aug 2020 13:36  --------------------------------------------------------  IN:  Total IN: 0 mL    OUT:    Other: 2500 mL  Total OUT: 2500 mL    Total NET: -2500 mL          PHYSICAL EXAM:  General: No distress  Respiratory: b/l air entry  Cardiovascular: S1 S2  Gastrointestinal: soft  Extremities:  edema, left AVF             LABORATORY:                        11.4   9.02  )-----------( 251      ( 21 Aug 2020 10:38 )             36.9     08-21    133<L>  |  100  |  67<H>  ----------------------------<  203<H>  5.6<H>   |  26  |  5.40<H>    Ca    7.9<L>      21 Aug 2020 10:38      Sodium, Serum: 133 mmol/L (08-21 @ 10:38)    Potassium, Serum: 5.6 mmol/L (08-21 @ 10:38)    Hemoglobin: 11.4 g/dL (08-21 @ 10:38)  Hemoglobin: 11.5 g/dL (08-19 @ 11:35)  Hemoglobin: 12.4 g/dL (08-18 @ 13:57)    Creatinine, Serum 5.40 (08-21 @ 10:38)  Creatinine, Serum 4.30 (08-19 @ 12:20)  Creatinine, Serum 5.10 (08-18 @ 20:16)

## 2020-08-21 NOTE — PROVIDER CONTACT NOTE (OTHER) - ACTION/TREATMENT ORDERED:
made aware.  States to get an EKG done on pt.  Awaiting further interventions.  Will monitor
pt to be d/c home tomorrow after dialysis, not today
daughter able to verbalize understanding

## 2020-08-24 NOTE — PROGRESS NOTE ADULT - PROBLEM SELECTOR PLAN 1
Patient:   AMADOR CHINCHILLA            MRN: [id number]            FIN: [id number]              Age:   67 years     Sex:  MALE     :  48   Associated Diagnoses:   None   Author:   SARA ELY           Subjective   CC: Weakness  HPI: HPI was discussed with nursing staff.  The patient continues to report nonproductive cough.  Chest x-ray was done.  The patient is tolerating therapies well.    Functional status: Supervision contact-guard assist with transfers, standby assistance to ambulate with a rolling walker.  Minimum assistance wheelchair mobility 80 feet.     Health Status   Allergies:    Allergies (1) Active Reaction  amoxicillin-clavulanate None Documented    Current medications:    Medications (16) Active  Scheduled: (11)  Aspirin 81 mg chew tab  81 mg 1 tab, Oral, Daily  Atorvastatin 80 mg tab  80 mg 1 tab, Oral, Daily  Docusate sodium 100 mg cap  100 mg 1 cap, Oral, BID  Furosemide 40 mg tab  40 mg 1 tab, Oral, BID  Gabapentin 300 mg cap  300 mg 1 cap, Oral, Q8H  GuaiFENesin 600 mg LA tab  600 mg 1 tab, Oral, Q12H  Heparin 5,000 unit/1 mL inj MDV  5,000 unit 1 mL, Subcutaneous, Q8H  Metoprolol succinate 50 mg XL tab  50 mg 1 tab, Oral, Daily  Multivitamin-minerals therapeutic tab  1 tab, Oral, Daily  Pantoprazole 40 mg DR tab  40 mg 1 tab, Oral, Q Bedtime  Potassium CHLORIDE 10 mEq ER tab  10 mEq 1 tab, Oral, Daily  Continuous: (0)  PRN: (5)  Acetaminophen 325 mg tab  650 mg 2 tab, Oral, Q6H  Docusate sodium 283 mg enema 5 mL UD  1 application, Rectal, Q Evening  Hydrocodone-acetaminophen 5-325 mg tab  1 tab, Oral, Q4H  Milk of magnesia 8% 30 mL oral susp UD  30 mL, Oral, BID  Pramoxine-hydrocortisone 1%-2.5% rectal cream 30 gm  1 application, Rectal, TID      Review of Systems   Constitutional:  Weakness.    Cardiovascular:  Negative.    Respiratory:  Cough.    Genitourinary:  Negative.    Gastrointestinal:  Negative.      Objective   VS/Measurements     Vitals between:   23-AUG-2020  13:57:38   TO   24-AUG-2020 13:57:38                   LAST RESULT MINIMUM MAXIMUM  Temperature 36.7 36.6 36.7  Heart Rate 120 80 120  Respiratory Rate 16 16 18  NISBP           100 91 102  NIDBP           64 54 84  SpO2                    93 90 94    General:  No acute distress.    Eye:  Extraocular movements are intact.    HENT:  Oral mucosa is moist.    Neck:  Supple, Right neck incision is healing well.    Respiratory:  Respirations are non-labored.    Cardiovascular:  Normal rate, Regular rhythm.    Gastrointestinal:  Soft, Non-tender, Normal bowel sounds.    Musculoskeletal:  Left lower extremity edema, negative Homans sign.   Integumentary:  Warm, Chest incision is healing well.    Neurologic:  Alert, Strength is 5-/5.    Psychiatric:  Cooperative.      Results Review   General results   Today's results   08/24/20 04:15 CDT Sodium 138 mmol/L    Potassium 3.6 mmol/L    Chloride 102 mmol/L    Carbon Dioxide (CO2) 26 mmol/L    Anion Gap 14 mmol/L    BUN 17 mg/dL    Creatinine 0.85 mg/dL    BUN/Creatinine Ratio 20    GFR ESTIMATE  >90    GFR ESTIMATE NON  89    Calcium 9.0 mg/dL    Glucose Level 105 mg/dL  HI     XR CHEST 2V  Result Date: August 24, 2020 10:50 CDT  IMPRESSION:  1.  Residual bibasilar infiltrative/atelectatic changes, unchanged.  2.  S/P aortocoronary bypass and mid to lower cervical anterior fusion.  3.  Minimal degenerative changes, thoracic spine and bilateral acromioclavicular joints.  4.  Moderate gastric dilatation.        Impression and Plan   1.  Debility secondary to severe multivessel coronary artery disease, status post CABG x3 on August 10.  2.  High-grade stenosis of right ICA, status post right CEA on August 5.  3.  Cervical myelopathy with history of cervical fusion.  4.  Severe cervical central stenosis.  5.  Severe lumbar spinal stenosis.  6.  Acute blood loss anemia.  7.  Constipation.  8.  Hypertension.  9.  Hyperlipidemia.  IMPAIRMENT GROUP CODE:  09       Etiologic diagnosis: Coronary artery disease  PLAN:  REHAB: Continue inpatient acute comprehensive rehabilitation.  ID: Monitor for signs and symptoms of infection.  CARD: Monitor cardiac status with increased activities.  NEURO: Monitor for neurological changes.  Continue gabapentin for neurogenic symptoms.  PULM: Monitor respiratory status with increased activities.  Monitor off oxygen.  Continue incentive spirometry.  HEME: Monitor hemoglobin.  GI: Continue on bowel program.  : Continue on bladder program.  SKIN: Nursing to monitor for any issues with skin integrity.  Continue wound care to the chest wall area.  VASC: Continue heparin at prophylactic dosage.  ELECTROLYTES:  Will monitor and make adjustments as needed.   NUTRITION:  DISPOSITION: Team conference was completed on August 19.  Discharge date is September 1.   2/2 acute calculus cholecystitis with biliary dilatation with common bile duct stone.   Blood Cx + for ESBL. E.coli antibx switched to Ertapenem.  completed course of iv antibx  family wants pt to go to rehab  apprec SW note awaiting authorization from insurance 2/2 acute calculus cholecystitis with biliary dilatation with common bile duct stone.   Blood Cx + for ESBL. E.coli   completed course of iv antibx of ertapenem  family wants pt to go to rehab  apprec SW note awaiting authorization from insurance

## 2020-09-07 NOTE — PATIENT PROFILE ADULT - CAREGIVER ADDRESS
Addended by: ENRRIQUE STRONG on: 9/7/2020 12:44 PM     Modules accepted: Orders    
[address] [place] [address] dr Cherry

## 2020-10-05 NOTE — PRE-OP CHECKLIST - HOW ADMINISTERED
Detail Level: Detailed
General Sunscreen Counseling: I recommended a broad-spectrum sunscreen with a sun protection factor (SPF) of 30 or higher.  I explained that SPF 30 sunscreens block approximately 97 percent of the sun's harmful rays.  Sunscreens should be applied at least 10 minutes prior to expected sun exposure and then every 2 hours after that as long as sun exposure continues. If swimming or exercising, sunscreen should be reapplied every 40-80 minutes after getting wet or sweating (depending on the water resistance of the sunscreen).  One ounce, or the equivalent of a shot glass full of sunscreen, is adequate to protect the skin not covered by a bathing suit. I also recommended wearing a wide-brimmed hat, a lip balm with a sunscreen and sunglasses. Sun protective clothing can be used in lieu of sunscreen but must be worn the entire time you are exposed to the sun's rays. I also recommended avoiding sun during the peak UVB hours from around 10am to 3pm.
See MAR for last dose taken

## 2020-10-23 NOTE — OCCUPATIONAL THERAPY INITIAL EVALUATION ADULT - LEVEL OF INDEPENDENCE: TOILET, REHAB EVAL
not assessed due to decreased safety; unable to maintain NWB RLE

## 2020-10-26 NOTE — PATIENT PROFILE ADULT - BRADEN SCORE
15 Cellcept Pregnancy And Lactation Text: This medication is Pregnancy Category D and isn't considered safe during pregnancy. It is unknown if this medication is excreted in breast milk.

## 2020-11-09 NOTE — PHYSICAL THERAPY INITIAL EVALUATION ADULT - TRANSFER SKILLS, REHAB EVAL
Called patient left a detailed message will schedule a follow up ----- Message from Jd Goode sent at 11/9/2020 10:30 AM CST -----  Contact: Basil ( son ) @ [phone]  Caller calling to get an update on the f/u appt and medication refill on ( levETIRAcetam (KEPPRA) 500 MG Tab) pls call and send to:     [address] [address]  Phone: [phone] Fax: [phone]       needs device and assist

## 2020-11-20 NOTE — H&P ADULT - EXTREMITIES
Destruction After The Procedure: No
Cryotherapy Text: The wound bed was treated with cryotherapy after the biopsy was performed.
Post-Care Instructions: I reviewed with the patient in detail post-care instructions. Patient is to keep the biopsy site dry overnight, and then apply bacitracin twice daily until healed. Patient may apply hydrogen peroxide soaks to remove any crusting.
Electrodesiccation And Curettage Text: The wound bed was treated with electrodesiccation and curettage after the biopsy was performed.
Silver Nitrate Text: The wound bed was treated with silver nitrate after the biopsy was performed.
Notification Instructions: Patient will be notified of biopsy results. However, patient instructed to call the office if not contacted within 2 weeks.
X Size Of Lesion In Cm: 0
Consent: Written consent was obtained and risks were reviewed including but not limited to scarring, infection, bleeding, scabbing, incomplete removal, nerve damage and allergy to anesthesia.
Depth Of Biopsy: dermis
Hemostasis: Drysol
Was A Bandage Applied: Yes
Billing Type: Third-Party Bill
Electrodesiccation Text: The wound bed was treated with electrodesiccation after the biopsy was performed.
Biopsy Type: DIF
Detail Level: Detailed
Dressing: bandage
Anesthesia Volume In Cc: 0.6
Curettage Text: The wound bed was treated with curettage after the biopsy was performed.
Information: Selecting Yes will display possible errors in your note based on the variables you have selected. This validation is only offered as a suggestion for you. PLEASE NOTE THAT THE VALIDATION TEXT WILL BE REMOVED WHEN YOU FINALIZE YOUR NOTE. IF YOU WANT TO FAX A PRELIMINARY NOTE YOU WILL NEED TO TOGGLE THIS TO 'NO' IF YOU DO NOT WANT IT IN YOUR FAXED NOTE.
Biopsy Method: Dermablade
Anesthesia Type: 1% lidocaine with epinephrine
Type Of Destruction Used: Curettage
Wound Care: Petrolatum
Biopsy Type: H and E
detailed exam

## 2021-01-22 NOTE — PHYSICAL THERAPY INITIAL EVALUATION ADULT - LEVEL OF INDEPENDENCE: SUPINE/SIT, REHAB EVAL
Yes
Yes-Patient/Caregiver accepts free interpretation services...
moderate assist (50% patients effort)

## 2021-03-22 NOTE — H&P ADULT - PROBLEM SELECTOR PROBLEM 4
Patient not eligible for BPCI-A Bundle Program d/t primary insurance [employer].
Social Work Discharge/Planning:    SW met with patient to explain role and discuss transition of care. Patient reports being independent and able to drive prior to admission. Patient is from home. Patient lives with wife Francesca Pulido [phone]) in a ranch style home that has 4 steps to enter. Patient is independent with all ADL's. Patient has no DME. Patient does not wear oxygen at home. Patient has no SANJU/SNF hx. Patient has [address] hx with [facility]. SW discussed [address] with patient, patient declines the need for [address] at discharge. Patient's PCP is Dr. Corby Mullins. Patient's pharmacy is [facility] located in [place]. Patient reports plan is to return home with no anticipated needs once medically clear for discharge. Patient reports his wife is able to transport him home day of discharge. MEI/ANAIS to follow for any needs that may arise upon discharge.     MIKE Farnsworth  [phone]
ESRD (end stage renal disease) on dialysis

## 2021-04-12 ENCOUNTER — INPATIENT (INPATIENT)
Facility: HOSPITAL | Age: 71
LOS: 10 days | Discharge: NURSING, REHAB OR LONG-TERM CARE | DRG: 291 | End: 2021-04-23
Attending: FAMILY MEDICINE | Admitting: FAMILY MEDICINE
Payer: MEDICARE

## 2021-04-12 VITALS — HEIGHT: 61 IN | OXYGEN SATURATION: 99 % | HEART RATE: 74 BPM

## 2021-04-12 DIAGNOSIS — R06.02 SHORTNESS OF BREATH: ICD-10-CM

## 2021-04-12 DIAGNOSIS — Z90.710 ACQUIRED ABSENCE OF BOTH CERVIX AND UTERUS: Chronic | ICD-10-CM

## 2021-04-12 DIAGNOSIS — H26.9 UNSPECIFIED CATARACT: Chronic | ICD-10-CM

## 2021-04-12 DIAGNOSIS — Z98.890 OTHER SPECIFIED POSTPROCEDURAL STATES: Chronic | ICD-10-CM

## 2021-04-12 LAB
ALBUMIN SERPL ELPH-MCNC: 3.1 G/DL — LOW (ref 3.3–5)
ALBUMIN SERPL ELPH-MCNC: 3.4 G/DL — SIGNIFICANT CHANGE UP (ref 3.3–5)
ALP SERPL-CCNC: 91 U/L — SIGNIFICANT CHANGE UP (ref 40–120)
ALP SERPL-CCNC: 99 U/L — SIGNIFICANT CHANGE UP (ref 40–120)
ALT FLD-CCNC: 14 U/L — SIGNIFICANT CHANGE UP (ref 12–78)
ALT FLD-CCNC: 20 U/L — SIGNIFICANT CHANGE UP (ref 12–78)
ANION GAP SERPL CALC-SCNC: 7 MMOL/L — SIGNIFICANT CHANGE UP (ref 5–17)
ANION GAP SERPL CALC-SCNC: 7 MMOL/L — SIGNIFICANT CHANGE UP (ref 5–17)
APTT BLD: 31.1 SEC — SIGNIFICANT CHANGE UP (ref 27.5–35.5)
APTT BLD: 34.3 SEC — SIGNIFICANT CHANGE UP (ref 27.5–35.5)
AST SERPL-CCNC: 11 U/L — LOW (ref 15–37)
AST SERPL-CCNC: 14 U/L — LOW (ref 15–37)
BASE EXCESS BLDA CALC-SCNC: -1.5 MMOL/L — SIGNIFICANT CHANGE UP (ref -2–2)
BASOPHILS # BLD AUTO: 0.02 K/UL — SIGNIFICANT CHANGE UP (ref 0–0.2)
BASOPHILS # BLD AUTO: 0.03 K/UL — SIGNIFICANT CHANGE UP (ref 0–0.2)
BASOPHILS NFR BLD AUTO: 0.1 % — SIGNIFICANT CHANGE UP (ref 0–2)
BASOPHILS NFR BLD AUTO: 0.2 % — SIGNIFICANT CHANGE UP (ref 0–2)
BILIRUB SERPL-MCNC: 0.4 MG/DL — SIGNIFICANT CHANGE UP (ref 0.2–1.2)
BILIRUB SERPL-MCNC: 0.5 MG/DL — SIGNIFICANT CHANGE UP (ref 0.2–1.2)
BLOOD GAS COMMENTS ARTERIAL: SIGNIFICANT CHANGE UP
BUN SERPL-MCNC: 16 MG/DL — SIGNIFICANT CHANGE UP (ref 7–23)
BUN SERPL-MCNC: 61 MG/DL — HIGH (ref 7–23)
CALCIUM SERPL-MCNC: 8.6 MG/DL — SIGNIFICANT CHANGE UP (ref 8.5–10.1)
CALCIUM SERPL-MCNC: 8.7 MG/DL — SIGNIFICANT CHANGE UP (ref 8.5–10.1)
CHLORIDE SERPL-SCNC: 100 MMOL/L — SIGNIFICANT CHANGE UP (ref 96–108)
CHLORIDE SERPL-SCNC: 98 MMOL/L — SIGNIFICANT CHANGE UP (ref 96–108)
CK MB BLD-MCNC: 4.4 % — HIGH (ref 0–3.5)
CK MB CFR SERPL CALC: 1.8 NG/ML — SIGNIFICANT CHANGE UP (ref 0–3.6)
CK MB CFR SERPL CALC: 2.2 NG/ML — SIGNIFICANT CHANGE UP (ref 0–3.6)
CK SERPL-CCNC: 50 U/L — SIGNIFICANT CHANGE UP (ref 26–192)
CO2 SERPL-SCNC: 26 MMOL/L — SIGNIFICANT CHANGE UP (ref 22–31)
CO2 SERPL-SCNC: 30 MMOL/L — SIGNIFICANT CHANGE UP (ref 22–31)
CREAT SERPL-MCNC: 2.4 MG/DL — HIGH (ref 0.5–1.3)
CREAT SERPL-MCNC: 6.7 MG/DL — HIGH (ref 0.5–1.3)
EOSINOPHIL # BLD AUTO: 0.04 K/UL — SIGNIFICANT CHANGE UP (ref 0–0.5)
EOSINOPHIL # BLD AUTO: 0.06 K/UL — SIGNIFICANT CHANGE UP (ref 0–0.5)
EOSINOPHIL NFR BLD AUTO: 0.3 % — SIGNIFICANT CHANGE UP (ref 0–6)
EOSINOPHIL NFR BLD AUTO: 0.4 % — SIGNIFICANT CHANGE UP (ref 0–6)
GLUCOSE SERPL-MCNC: 266 MG/DL — HIGH (ref 70–99)
GLUCOSE SERPL-MCNC: 91 MG/DL — SIGNIFICANT CHANGE UP (ref 70–99)
HCO3 BLDA-SCNC: 23 MMOL/L — SIGNIFICANT CHANGE UP (ref 23–27)
HCT VFR BLD CALC: 35.9 % — SIGNIFICANT CHANGE UP (ref 34.5–45)
HCT VFR BLD CALC: 36.3 % — SIGNIFICANT CHANGE UP (ref 34.5–45)
HGB BLD-MCNC: 10.9 G/DL — LOW (ref 11.5–15.5)
HGB BLD-MCNC: 11.6 G/DL — SIGNIFICANT CHANGE UP (ref 11.5–15.5)
HOROWITZ INDEX BLDA+IHG-RTO: 30 — SIGNIFICANT CHANGE UP
IMM GRANULOCYTES NFR BLD AUTO: 0.5 % — SIGNIFICANT CHANGE UP (ref 0–1.5)
IMM GRANULOCYTES NFR BLD AUTO: 0.6 % — SIGNIFICANT CHANGE UP (ref 0–1.5)
INR BLD: 1.17 RATIO — HIGH (ref 0.88–1.16)
INR BLD: 1.19 RATIO — HIGH (ref 0.88–1.16)
LACTATE SERPL-SCNC: 1.1 MMOL/L — SIGNIFICANT CHANGE UP (ref 0.7–2)
LYMPHOCYTES # BLD AUTO: 1.68 K/UL — SIGNIFICANT CHANGE UP (ref 1–3.3)
LYMPHOCYTES # BLD AUTO: 1.95 K/UL — SIGNIFICANT CHANGE UP (ref 1–3.3)
LYMPHOCYTES # BLD AUTO: 12.4 % — LOW (ref 13–44)
LYMPHOCYTES # BLD AUTO: 13.8 % — SIGNIFICANT CHANGE UP (ref 13–44)
MAGNESIUM SERPL-MCNC: 2 MG/DL — SIGNIFICANT CHANGE UP (ref 1.6–2.6)
MAGNESIUM SERPL-MCNC: 2.4 MG/DL — SIGNIFICANT CHANGE UP (ref 1.6–2.6)
MCHC RBC-ENTMCNC: 28.8 PG — SIGNIFICANT CHANGE UP (ref 27–34)
MCHC RBC-ENTMCNC: 29.5 PG — SIGNIFICANT CHANGE UP (ref 27–34)
MCHC RBC-ENTMCNC: 30.4 GM/DL — LOW (ref 32–36)
MCHC RBC-ENTMCNC: 32 GM/DL — SIGNIFICANT CHANGE UP (ref 32–36)
MCV RBC AUTO: 92.4 FL — SIGNIFICANT CHANGE UP (ref 80–100)
MCV RBC AUTO: 94.7 FL — SIGNIFICANT CHANGE UP (ref 80–100)
MONOCYTES # BLD AUTO: 0.38 K/UL — SIGNIFICANT CHANGE UP (ref 0–0.9)
MONOCYTES # BLD AUTO: 0.42 K/UL — SIGNIFICANT CHANGE UP (ref 0–0.9)
MONOCYTES NFR BLD AUTO: 2.7 % — SIGNIFICANT CHANGE UP (ref 2–14)
MONOCYTES NFR BLD AUTO: 3.1 % — SIGNIFICANT CHANGE UP (ref 2–14)
NEUTROPHILS # BLD AUTO: 11.35 K/UL — HIGH (ref 1.8–7.4)
NEUTROPHILS # BLD AUTO: 11.65 K/UL — HIGH (ref 1.8–7.4)
NEUTROPHILS NFR BLD AUTO: 82.3 % — HIGH (ref 43–77)
NEUTROPHILS NFR BLD AUTO: 83.6 % — HIGH (ref 43–77)
NRBC # BLD: 0 /100 WBCS — SIGNIFICANT CHANGE UP (ref 0–0)
NRBC # BLD: 0 /100 WBCS — SIGNIFICANT CHANGE UP (ref 0–0)
NT-PROBNP SERPL-SCNC: HIGH PG/ML (ref 0–125)
PCO2 BLDA: 50 MMHG — HIGH (ref 32–46)
PH BLDA: 7.3 — LOW (ref 7.35–7.45)
PHOSPHATE SERPL-MCNC: 2.8 MG/DL — SIGNIFICANT CHANGE UP (ref 2.5–4.5)
PLATELET # BLD AUTO: 314 K/UL — SIGNIFICANT CHANGE UP (ref 150–400)
PLATELET # BLD AUTO: 316 K/UL — SIGNIFICANT CHANGE UP (ref 150–400)
PO2 BLDA: 119 MMHG — HIGH (ref 74–108)
POTASSIUM SERPL-MCNC: 3.5 MMOL/L — SIGNIFICANT CHANGE UP (ref 3.5–5.3)
POTASSIUM SERPL-MCNC: 6.8 MMOL/L — CRITICAL HIGH (ref 3.5–5.3)
POTASSIUM SERPL-SCNC: 3.5 MMOL/L — SIGNIFICANT CHANGE UP (ref 3.5–5.3)
POTASSIUM SERPL-SCNC: 6.8 MMOL/L — CRITICAL HIGH (ref 3.5–5.3)
PROT SERPL-MCNC: 8.3 G/DL — SIGNIFICANT CHANGE UP (ref 6–8.3)
PROT SERPL-MCNC: 9.4 G/DL — HIGH (ref 6–8.3)
PROTHROM AB SERPL-ACNC: 13.6 SEC — SIGNIFICANT CHANGE UP (ref 10.6–13.6)
PROTHROM AB SERPL-ACNC: 13.8 SEC — HIGH (ref 10.6–13.6)
RBC # BLD: 3.79 M/UL — LOW (ref 3.8–5.2)
RBC # BLD: 3.93 M/UL — SIGNIFICANT CHANGE UP (ref 3.8–5.2)
RBC # FLD: 13.8 % — SIGNIFICANT CHANGE UP (ref 10.3–14.5)
RBC # FLD: 14 % — SIGNIFICANT CHANGE UP (ref 10.3–14.5)
SAO2 % BLDA: 97 % — HIGH (ref 92–96)
SARS-COV-2 RNA SPEC QL NAA+PROBE: SIGNIFICANT CHANGE UP
SODIUM SERPL-SCNC: 133 MMOL/L — LOW (ref 135–145)
SODIUM SERPL-SCNC: 135 MMOL/L — SIGNIFICANT CHANGE UP (ref 135–145)
TROPONIN I SERPL-MCNC: 0.04 NG/ML — SIGNIFICANT CHANGE UP (ref 0.01–0.04)
TROPONIN I SERPL-MCNC: 0.04 NG/ML — SIGNIFICANT CHANGE UP (ref 0.01–0.04)
WBC # BLD: 13.58 K/UL — HIGH (ref 3.8–10.5)
WBC # BLD: 14.16 K/UL — HIGH (ref 3.8–10.5)
WBC # FLD AUTO: 13.58 K/UL — HIGH (ref 3.8–10.5)
WBC # FLD AUTO: 14.16 K/UL — HIGH (ref 3.8–10.5)

## 2021-04-12 PROCEDURE — 71045 X-RAY EXAM CHEST 1 VIEW: CPT | Mod: 26

## 2021-04-12 PROCEDURE — 99291 CRITICAL CARE FIRST HOUR: CPT

## 2021-04-12 PROCEDURE — 93010 ELECTROCARDIOGRAM REPORT: CPT

## 2021-04-12 RX ORDER — CHLORHEXIDINE GLUCONATE 213 G/1000ML
1 SOLUTION TOPICAL
Refills: 0 | Status: DISCONTINUED | OUTPATIENT
Start: 2021-04-12 | End: 2021-04-12

## 2021-04-12 RX ORDER — HUMAN INSULIN 100 [IU]/ML
6 INJECTION, SUSPENSION SUBCUTANEOUS ONCE
Refills: 0 | Status: DISCONTINUED | OUTPATIENT
Start: 2021-04-12 | End: 2021-04-12

## 2021-04-12 RX ORDER — HEPARIN SODIUM 5000 [USP'U]/ML
5000 INJECTION INTRAVENOUS; SUBCUTANEOUS EVERY 8 HOURS
Refills: 0 | Status: DISCONTINUED | OUTPATIENT
Start: 2021-04-12 | End: 2021-04-23

## 2021-04-12 RX ORDER — NIFEDIPINE 30 MG
30 TABLET, EXTENDED RELEASE 24 HR ORAL DAILY
Refills: 0 | Status: DISCONTINUED | OUTPATIENT
Start: 2021-04-12 | End: 2021-04-14

## 2021-04-12 RX ORDER — INSULIN LISPRO 100/ML
VIAL (ML) SUBCUTANEOUS EVERY 4 HOURS
Refills: 0 | Status: DISCONTINUED | OUTPATIENT
Start: 2021-04-12 | End: 2021-04-14

## 2021-04-12 RX ORDER — DEXTROSE 50 % IN WATER 50 %
50 SYRINGE (ML) INTRAVENOUS ONCE
Refills: 0 | Status: DISCONTINUED | OUTPATIENT
Start: 2021-04-12 | End: 2021-04-12

## 2021-04-12 RX ORDER — ASPIRIN/CALCIUM CARB/MAGNESIUM 324 MG
81 TABLET ORAL DAILY
Refills: 0 | Status: DISCONTINUED | OUTPATIENT
Start: 2021-04-12 | End: 2021-04-13

## 2021-04-12 RX ORDER — IPRATROPIUM/ALBUTEROL SULFATE 18-103MCG
3 AEROSOL WITH ADAPTER (GRAM) INHALATION EVERY 6 HOURS
Refills: 0 | Status: DISCONTINUED | OUTPATIENT
Start: 2021-04-12 | End: 2021-04-23

## 2021-04-12 RX ORDER — INSULIN LISPRO 100/ML
5 VIAL (ML) SUBCUTANEOUS
Refills: 0 | Status: DISCONTINUED | OUTPATIENT
Start: 2021-04-13 | End: 2021-04-23

## 2021-04-12 RX ORDER — ISOSORBIDE MONONITRATE 60 MG/1
60 TABLET, EXTENDED RELEASE ORAL DAILY
Refills: 0 | Status: DISCONTINUED | OUTPATIENT
Start: 2021-04-12 | End: 2021-04-14

## 2021-04-12 RX ORDER — ONDANSETRON 8 MG/1
4 TABLET, FILM COATED ORAL ONCE
Refills: 0 | Status: COMPLETED | OUTPATIENT
Start: 2021-04-12 | End: 2021-04-12

## 2021-04-12 RX ORDER — NITROGLYCERIN 6.5 MG
0.4 CAPSULE, EXTENDED RELEASE ORAL ONCE
Refills: 0 | Status: COMPLETED | OUTPATIENT
Start: 2021-04-12 | End: 2021-04-12

## 2021-04-12 RX ORDER — SEVELAMER CARBONATE 2400 MG/1
1 POWDER, FOR SUSPENSION ORAL
Qty: 0 | Refills: 0 | DISCHARGE

## 2021-04-12 RX ORDER — INSULIN HUMAN 100 [IU]/ML
6 INJECTION, SOLUTION SUBCUTANEOUS ONCE
Refills: 0 | Status: COMPLETED | OUTPATIENT
Start: 2021-04-12 | End: 2021-04-12

## 2021-04-12 RX ORDER — INSULIN LISPRO 100/ML
5 VIAL (ML) SUBCUTANEOUS
Refills: 0 | Status: DISCONTINUED | OUTPATIENT
Start: 2021-04-12 | End: 2021-04-23

## 2021-04-12 RX ORDER — CALCIUM GLUCONATE 100 MG/ML
1 VIAL (ML) INTRAVENOUS ONCE
Refills: 0 | Status: COMPLETED | OUTPATIENT
Start: 2021-04-12 | End: 2021-04-12

## 2021-04-12 RX ORDER — INSULIN GLARGINE 100 [IU]/ML
20 INJECTION, SOLUTION SUBCUTANEOUS AT BEDTIME
Refills: 0 | Status: DISCONTINUED | OUTPATIENT
Start: 2021-04-12 | End: 2021-04-14

## 2021-04-12 RX ORDER — INSULIN ASPART 100 [IU]/ML
3 INJECTION, SOLUTION SUBCUTANEOUS
Qty: 0 | Refills: 0 | DISCHARGE

## 2021-04-12 RX ORDER — SODIUM CHLORIDE 9 MG/ML
100 INJECTION INTRAMUSCULAR; INTRAVENOUS; SUBCUTANEOUS
Refills: 0 | Status: COMPLETED | OUTPATIENT
Start: 2021-04-12 | End: 2021-04-20

## 2021-04-12 RX ORDER — METOPROLOL TARTRATE 50 MG
50 TABLET ORAL
Refills: 0 | Status: DISCONTINUED | OUTPATIENT
Start: 2021-04-12 | End: 2021-04-14

## 2021-04-12 RX ORDER — ATORVASTATIN CALCIUM 80 MG/1
80 TABLET, FILM COATED ORAL AT BEDTIME
Refills: 0 | Status: DISCONTINUED | OUTPATIENT
Start: 2021-04-12 | End: 2021-04-20

## 2021-04-12 RX ORDER — GABAPENTIN 400 MG/1
300 CAPSULE ORAL EVERY 8 HOURS
Refills: 0 | Status: DISCONTINUED | OUTPATIENT
Start: 2021-04-12 | End: 2021-04-13

## 2021-04-12 RX ORDER — SODIUM ZIRCONIUM CYCLOSILICATE 10 G/10G
10 POWDER, FOR SUSPENSION ORAL ONCE
Refills: 0 | Status: COMPLETED | OUTPATIENT
Start: 2021-04-12 | End: 2021-04-12

## 2021-04-12 RX ORDER — DEXTROSE 50 % IN WATER 50 %
50 SYRINGE (ML) INTRAVENOUS ONCE
Refills: 0 | Status: COMPLETED | OUTPATIENT
Start: 2021-04-12 | End: 2021-04-12

## 2021-04-12 RX ADMIN — Medication 50 MILLILITER(S): at 11:52

## 2021-04-12 RX ADMIN — HEPARIN SODIUM 5000 UNIT(S): 5000 INJECTION INTRAVENOUS; SUBCUTANEOUS at 21:09

## 2021-04-12 RX ADMIN — Medication 0: at 21:10

## 2021-04-12 RX ADMIN — ISOSORBIDE MONONITRATE 60 MILLIGRAM(S): 60 TABLET, EXTENDED RELEASE ORAL at 16:23

## 2021-04-12 RX ADMIN — HEPARIN SODIUM 5000 UNIT(S): 5000 INJECTION INTRAVENOUS; SUBCUTANEOUS at 14:20

## 2021-04-12 RX ADMIN — Medication 3 MILLILITER(S): at 20:49

## 2021-04-12 RX ADMIN — Medication 100 GRAM(S): at 11:46

## 2021-04-12 RX ADMIN — Medication 50 MILLIGRAM(S): at 18:34

## 2021-04-12 RX ADMIN — Medication 30 MILLIGRAM(S): at 16:23

## 2021-04-12 RX ADMIN — Medication 2: at 14:10

## 2021-04-12 RX ADMIN — INSULIN GLARGINE 20 UNIT(S): 100 INJECTION, SOLUTION SUBCUTANEOUS at 21:09

## 2021-04-12 RX ADMIN — Medication 0.4 MILLIGRAM(S): at 10:00

## 2021-04-12 RX ADMIN — INSULIN HUMAN 6 UNIT(S): 100 INJECTION, SOLUTION SUBCUTANEOUS at 11:52

## 2021-04-12 RX ADMIN — ATORVASTATIN CALCIUM 80 MILLIGRAM(S): 80 TABLET, FILM COATED ORAL at 21:10

## 2021-04-12 RX ADMIN — ONDANSETRON 4 MILLIGRAM(S): 8 TABLET, FILM COATED ORAL at 10:00

## 2021-04-12 RX ADMIN — GABAPENTIN 300 MILLIGRAM(S): 400 CAPSULE ORAL at 21:10

## 2021-04-12 RX ADMIN — Medication 81 MILLIGRAM(S): at 16:23

## 2021-04-12 RX ADMIN — GABAPENTIN 300 MILLIGRAM(S): 400 CAPSULE ORAL at 14:20

## 2021-04-12 RX ADMIN — SODIUM ZIRCONIUM CYCLOSILICATE 10 GRAM(S): 10 POWDER, FOR SUSPENSION ORAL at 11:46

## 2021-04-12 NOTE — ED PROVIDER NOTE - OBJECTIVE STATEMENT
Pt is a 70 yo female with pmhx of Acute urinary retention CAD Cholecystitis with cholangitis Chronic pancreatitis Diabetes Type 2 DM ESRD on dialysis MWF since 05/2018 due for dialysis today H/O diabetic neuropathy Hypertension Liver abscess resolved Myocardial infarction 2007 BIBEMS for SOB since 2 am. Pt states only complaint she has is sob. Pt states being in ambulance made her nauseous. Pt denies any fever chills vomiting diarrhea.     pcp Светлана Wagner

## 2021-04-12 NOTE — ED ADULT NURSE NOTE - OBJECTIVE STATEMENT
Pt BIB EMS c/c of sudden onset of shortness of breath at 3 am, unable to go to dialysis this AM as scheduled. Pt presents tachypneic, w/ accessory muscle use, 82% on room air, non rebreather 100%. Pt w/ wheezes throughout.

## 2021-04-12 NOTE — ED PROVIDER NOTE - CONSTITUTIONAL, MLM
Well appearing, awake, alert, oriented to person, place, time/situation and mod resp distress + tacypnea normal...

## 2021-04-12 NOTE — ED PROVIDER NOTE - CARE PLAN
Principal Discharge DX:	SOB (shortness of breath)  Secondary Diagnosis:	ESRD (end stage renal disease) on dialysis  Secondary Diagnosis:	Hyperkalemia

## 2021-04-12 NOTE — ED PROVIDER NOTE - PROGRESS NOTE DETAILS
pt on bipap with work of breathing hyperkalemia seen by ICU will admit spoke with Dr. watson covering Dr. gallagher

## 2021-04-12 NOTE — ED ADULT NURSE NOTE - CHIEF COMPLAINT QUOTE
pt from home, called ems for diff breathing, pt with labored breathing and using accessory muscles, pt tachypneic with exp wheezing, ems arrival sats 83% rrom air, on NRB

## 2021-04-12 NOTE — ED PROVIDER NOTE - ATTENDING CONTRIBUTION TO CARE
71 female presents to ER in acute respiratory distress, states she is due for dialysis today, patient using accessory muscles for respiration, rales bilateral lungs, abdomen soft,non tender, f/u BIPAP, nitro SL, cardiac monitor, call renal for dialysis, labs, ekg, admit.

## 2021-04-12 NOTE — CONSULT NOTE ADULT - ASSESSMENT
Assessment:  71 yr female with state hx significant for CAD s/p stents ('07), HTN, MI, liver abscess, chronic pancreatitis, DM on insulin, ESRD on HD (M/W/F) who presented to [facility] today (4/12/21) with c/o sob, found to be hypoxic on rm air, with increased wob and hypercapnic requiring bipap therapy. In addition found be hyperkalemic without ECG changes of 6.8      Consult called for hypoxic/hypercapnic resp failure and hyperkalemia secondary to ESRD  Plan:    #Neuro:  -Neuro checks q 2 hrs and prn for changes  -activity as tolerated  -physical therapy consult when stable    #Pulm:  -presents with hypoxic/hypercapnic resp failure  -bipap therapy as needed  -titrate to maintain ph 7.35-7.45; PCO2 35-45; SPO2 > 92%  -Bronchodilator therapy q 6 hrs and prn sob/wheezes  -incentive spiromery 10x q 2 hrs    #CV:  -Hx CAD s/p stents, HTN, PAF  -ECG now and q am x3  -Cardiac Enzymes now and q am x 3  -home meds include Imdur 60 mg po qd, metoprolol 50 mg po q 12 hrs, nifedipine 30 mg po qd - will continue     #GI/:  -ESRD on HD M/W/F  -will obtain HD  -strict I & O's - keep neg  -diet as tolerated    #ID:  -presents with hypoxic/hypercapnic resp failure secondary to fluid overload  -pan culture    #FEN/ENDO/HEME:  -obtain CMP/Mg++/PO--4/CBC w diff/PT/PTT/INR now and q. a.m.  -bmp with Mg++/PO--4 q 4 hrs  -Hx DM on insulin - will add lantus 20 units subq q hs with lispro 5 units subq ac, ISS q 4 hrs at present (will need to change to ac and qhs when taking p.o. well        Critical Care Time: 40minutes   Reviewing data, imaging, discussing with multidisciplinary team, not inclusive of procedures, discussing goals of care with family

## 2021-04-12 NOTE — CONSULT NOTE ADULT - SUBJECTIVE AND OBJECTIVE BOX
Patient is a 71y old  Female who presents with a chief complaint of   HPI:      PAST MEDICAL HISTORY:  Diabetes    Hypertension    Myocardial infarction    Pneumonia    Hypertension    CRF (chronic renal failure)    Diabetes    CAD (coronary artery disease)    ESRD (end stage renal disease) on dialysis    Liver abscess    Acute urinary retention    Cholecystitis with cholangitis    Chronic pancreatitis    PAF (paroxysmal atrial fibrillation)    H/O diabetic neuropathy        PAST SURGICAL HISTORY:  H/O: hysterectomy    History of biliary stent insertion    S/P arteriovenous (AV) fistula creation    Cataract        FAMILY HISTORY:  No pertinent family history in first degree relatives        SOCIAL HISTORY:    Allergies    ertapenem (Urticaria)  Purell (Rash)    Intolerances      Home Medications:  acetaminophen 325 mg oral tablet: 2 tab(s) orally every 6 hours, As needed, Mild Pain (1 - 3) (21 Aug 2020 07:54)  aspirin 81 mg oral delayed release tablet: 1 tab(s) orally once a day (17 Aug 2020 18:51)  atorvastatin 80 mg oral tablet: 1 tab(s) orally once a day (at bedtime) (17 Aug 2020 18:51)  Basaglar KwikPen 100 units/mL subcutaneous solution: 20 unit(s) subcutaneous once a day (at bedtime)    *As per patient and daughter in law, she takes 10units in the morning and then 10 units at night. (17 Aug 2020 18:51)  gabapentin 300 mg oral capsule: 1 cap(s) orally 2 times a day (17 Aug 2020 18:51)  isosorbide mononitrate 60 mg oral tablet, extended release: 1 tab(s) orally once a day (17 Aug 2020 18:51)  Lokelma 5 g oral powder for reconstitution: Take orally three times a week on Tues, Thurs, and Sat. (17 Aug 2020 18:51)  metoprolol tartrate 50 mg oral tablet: 1 tab(s) orally 2 times a day (17 Aug 2020 18:51)  Nephro-Jose Daniel oral tablet: 0.8 mg 1 tab(s) orally once a day (17 Aug 2020 18:51)  NIFEdipine 60 mg oral tablet, extended release: 1 tab(s) orally once a day AM (17 Aug 2020 18:51)  NovoLOG 100 units/mL injectable solution: 3 unit(s) injectable 3 times a day (17 Aug 2020 18:51)  Renvela 800 mg oral tablet: 1 tab(s) orally 3 times a day (with meals) (17 Aug 2020 18:51)    MEDICATIONS  (STANDING):  calcium gluconate IVPB 1 Gram(s) IV Intermittent Once  dextrose 50% Injectable 50 milliLiter(s) IV Push once  insulin regular  human recombinant 6 Unit(s) IV Push once  sodium zirconium cyclosilicate 10 Gram(s) Oral Once    MEDICATIONS  (PRN):      REVIEW OF SYSTEMS:  General:   Respiratory: No cough, SOB  Cardiovascular: No CP or Palpitations	  Gastrointestinal: No nausea, Vomiting. No diarrhea  Genitourinary: No urinary complaints	  Musculoskeletal: No leg swelling, No new rash or lesions	  Neurological: 	  all other systems negative    T(F): --  HR: 67 (04-12-21 @ 09:58) (67 - 74)  BP: 178/78 (04-12-21 @ 09:58) (178/78 - 182/81)  RR: 24 (04-12-21 @ 09:58) (24 - 24)  SpO2: 98% (04-12-21 @ 09:40) (98% - 100%)  Wt(kg): --    PHYSICAL EXAM:  General: NAD  Respiratory: b/l air entry  Cardiovascular: S1 S2  Gastrointestinal: soft  Extremities: edema        04-12    133<L>  |  100  |  61<H>  ----------------------------<  266<H>  6.8<HH>   |  26  |  6.70<H>    Ca    8.6      12 Apr 2021 09:59  Mg     2.4     04-12    TPro  8.3  /  Alb  3.1<L>  /  TBili  0.4  /  DBili  x   /  AST  11<L>  /  ALT  14  /  AlkPhos  91  04-12                          10.9   14.16 )-----------( 316      ( 12 Apr 2021 09:59 )             35.9       Hemoglobin: 10.9 g/dL (04-12 @ 09:59)  Hematocrit: 35.9 % (04-12 @ 09:59)  Potassium, Serum: 6.8 mmol/L (04-12 @ 09:59)  Blood Urea Nitrogen, Serum: 61 mg/dL (04-12 @ 09:59)      Creatinine, Serum: 6.70 (04-12 @ 09:59)        LIVER FUNCTIONS - ( 12 Apr 2021 09:59 )  Alb: 3.1 g/dL / Pro: 8.3 g/dL / ALK PHOS: 91 U/L / ALT: 14 U/L / AST: 11 U/L / GGT: x           CARDIAC MARKERS ( 12 Apr 2021 09:59 )  .036 ng/mL / x     / x     / x     / 1.8 ng/mL            ABG - ( 12 Apr 2021 10:52 )  pH, Arterial: 7.30  pH, Blood: x     /  pCO2: 50    /  pO2: 119   / HCO3: 23    / Base Excess: -1.5  /  SaO2: 97                I&O's Detail         Patient is a 71y old  Female who presents with a chief complaint of SOB.     HPI:  1 yr female with PMHx CAD s/p stents '07, HTN, MI, PAF, liver abscess, s/p biliary stent with removal (11/2018; 7/2/19), chronic pancreatitis, DM2 on insulin, neuropathy, ESRD (5/2018) on HD (M/W/F) who presented this morning (4/12/21) via EMS from home with progressive SOB and found to be in hypoxic resp failure with SPO2 of 83% on Rm Air.      In E.D. pt initially placed on NRB with improvement of SPO2 99% with eventual placement of bipap therapy secondary to increased WOB and hypercapnic resp failure with ph 7.30 PCO2 of 50 serum HCO3 of 26, CXR with bilat opacities. Pt also found to have hyperkalemia of 6.8 without ECG changes, hyperglycemic of 266, WBC of 14.2, BNP 93709. Pt received lokelma 10gms po, Reg insuline 10 units IV x1.     Renal consult called for ESRD on hemodialysis, Hyperkalemia.   .    PAST MEDICAL HISTORY:  Diabetes    Hypertension    Myocardial infarction    Pneumonia    Hypertension    CRF (chronic renal failure)    Diabetes    CAD (coronary artery disease)    ESRD (end stage renal disease) on dialysis    Liver abscess    Acute urinary retention    Cholecystitis with cholangitis    Chronic pancreatitis    PAF (paroxysmal atrial fibrillation)    H/O diabetic neuropathy        PAST SURGICAL HISTORY:  H/O: hysterectomy    History of biliary stent insertion    S/P arteriovenous (AV) fistula creation    Cataract        FAMILY HISTORY:  No pertinent family history in first degree relatives        SOCIAL HISTORY: No smoking or alcohol use     Allergies    ertapenem (Urticaria)  Purell (Rash)    Intolerances      Home Medications:  acetaminophen 325 mg oral tablet: 2 tab(s) orally every 6 hours, As needed, Mild Pain (1 - 3) (21 Aug 2020 07:54)  aspirin 81 mg oral delayed release tablet: 1 tab(s) orally once a day (17 Aug 2020 18:51)  atorvastatin 80 mg oral tablet: 1 tab(s) orally once a day (at bedtime) (17 Aug 2020 18:51)  Basaglar KwikPen 100 units/mL subcutaneous solution: 20 unit(s) subcutaneous once a day (at bedtime)    *As per patient and daughter in law, she takes 10units in the morning and then 10 units at night. (17 Aug 2020 18:51)  gabapentin 300 mg oral capsule: 1 cap(s) orally 2 times a day (17 Aug 2020 18:51)  isosorbide mononitrate 60 mg oral tablet, extended release: 1 tab(s) orally once a day (17 Aug 2020 18:51)  Lokelma 5 g oral powder for reconstitution: Take orally three times a week on Tues, Thurs, and Sat. (17 Aug 2020 18:51)  metoprolol tartrate 50 mg oral tablet: 1 tab(s) orally 2 times a day (17 Aug 2020 18:51)  Nephro-Jose Daniel oral tablet: 0.8 mg 1 tab(s) orally once a day (17 Aug 2020 18:51)  NIFEdipine 60 mg oral tablet, extended release: 1 tab(s) orally once a day AM (17 Aug 2020 18:51)  NovoLOG 100 units/mL injectable solution: 3 unit(s) injectable 3 times a day (17 Aug 2020 18:51)  Renvela 800 mg oral tablet: 1 tab(s) orally 3 times a day (with meals) (17 Aug 2020 18:51)    MEDICATIONS  (STANDING):  calcium gluconate IVPB 1 Gram(s) IV Intermittent Once  dextrose 50% Injectable 50 milliLiter(s) IV Push once  insulin regular  human recombinant 6 Unit(s) IV Push once  sodium zirconium cyclosilicate 10 Gram(s) Oral Once    MEDICATIONS  (PRN):      REVIEW OF SYSTEMS:  On BIPAP    T(F): --  HR: 67 (04-12-21 @ 09:58) (67 - 74)  BP: 178/78 (04-12-21 @ 09:58) (178/78 - 182/81)  RR: 24 (04-12-21 @ 09:58) (24 - 24)  SpO2: 98% (04-12-21 @ 09:40) (98% - 100%)  Wt(kg): --    PHYSICAL EXAM:  General: on BIPAP  Respiratory: b/l air entry  Cardiovascular: S1 S2  Gastrointestinal: soft  Extremities: edema, LEft arm AVF, + bruit        04-12    133<L>  |  100  |  61<H>  ----------------------------<  266<H>  6.8<HH>   |  26  |  6.70<H>    Ca    8.6      12 Apr 2021 09:59  Mg     2.4     04-12    TPro  8.3  /  Alb  3.1<L>  /  TBili  0.4  /  DBili  x   /  AST  11<L>  /  ALT  14  /  AlkPhos  91  04-12                          10.9   14.16 )-----------( 316      ( 12 Apr 2021 09:59 )             35.9       Hemoglobin: 10.9 g/dL (04-12 @ 09:59)  Hematocrit: 35.9 % (04-12 @ 09:59)  Potassium, Serum: 6.8 mmol/L (04-12 @ 09:59)  Blood Urea Nitrogen, Serum: 61 mg/dL (04-12 @ 09:59)      Creatinine, Serum: 6.70 (04-12 @ 09:59)        LIVER FUNCTIONS - ( 12 Apr 2021 09:59 )  Alb: 3.1 g/dL / Pro: 8.3 g/dL / ALK PHOS: 91 U/L / ALT: 14 U/L / AST: 11 U/L / GGT: x           CARDIAC MARKERS ( 12 Apr 2021 09:59 )  .036 ng/mL / x     / x     / x     / 1.8 ng/mL            ABG - ( 12 Apr 2021 10:52 )  pH, Arterial: 7.30  pH, Blood: x     /  pCO2: 50    /  pO2: 119   / HCO3: 23    / Base Excess: -1.5  /  SaO2: 97              < from: Xray Chest 1 View- PORTABLE-Urgent (Xray Chest 1 View- PORTABLE-Urgent .) (04.12.21 @ 10:15) >    EXAM:  XR CHEST PORTABLE URGENT 1V                            PROCEDURE DATE:  04/12/2021          INTERPRETATION:  AP semierect chest on April 12, 2021 at 10:47 AM. Patient is short of breath.    Heart is enlarged.    Diffuse infiltrative picture suggesting CHF is noted. The CHF is essentially new since August 17, 2020. Large jugular line on the earlier study no longer evident.    IMPRESSION: Heart enlargement and diffuse CHF.            ELIZA THOMPSON M.D., ATTENDING RADIOLOGIST  This documenthas been electronically signed. Apr 12 2021  2:43PM    < end of copied text >

## 2021-04-12 NOTE — H&P ADULT - HISTORY OF PRESENT ILLNESS
71 yr female with PMHx CAD s/p stents '07, HTN, MI, PAF, liver abscess, s/p biliary stent with removal (11/2018; 7/2/19), chronic pancreatitis, DM2 on insulin, neuropathy, ESRD (5/2018) on HD (M/W/F) who presented this morning (4/12/21) via EMS from home with progressive SOB and found to be in hypoxic resp failure with SPO2 of 83% on Rm Air.      In E.D. pt initially placed on NRB with improvement of SPO2 99% with eventual placement of bipap therapy secondary to increased WOB and hypercapnic resp failure with ph 7.30 PCO2 of 50 serum HCO3 of 26, CXR with bilat opacities. Pt also found to have hyperkalemia of 6.8 without ECG changes, hyperglycemic of 266, WBC of 14.2, BNP 51723. Pt received lokelma 10gms po, Reg insuline 10 units IV x1.        Consult called for hypoxic/hypercapnic resp failure and hyperkalemia secondary to ESRD

## 2021-04-12 NOTE — CONSULT NOTE ADULT - SUBJECTIVE AND OBJECTIVE BOX
CHIEF COMPLAINT:    HPI:  71 yr female with PMHx CAD s/p stents '07, HTN, MI, PAF, liver abscess, s/p biliary stent with removal (11/2018; 7/2/19), chronic pancreatitis, DM2 on insulin, neuropathy, ESRD (5/2018) on HD (M/W/F) who presented this morning (4/12/21) via EMS from home with progressive SOB found to be hypoxic resp failure with SPO2 of 83% on Rm Air.      In E.D. pt initially placed on NRB with improvement of SPO2 99% with eventual placement of bipap therapy secondary to increased WOB hypercapnic resp failure ph 7.30 PCO2 of 50 serum HCO3 of 26 . Pt also found to have hyperkalemia of 6.8 without ECG changes, WBC of 14.2, BNP 56093. Pt received lokelma 10gms po.                 PAST MEDICAL & SURGICAL HISTORY:  Hypertension    Myocardial infarction  2007    Diabetes  Type 2 DM    CAD (coronary artery disease)  s/p stent x 1 in 2007    ESRD (end stage renal disease) on dialysis  MWF since 05/2018    Liver abscess  resolved    Acute urinary retention    Cholecystitis with cholangitis    Chronic pancreatitis    PAF (paroxysmal atrial fibrillation)  1 episode in 11/2018 while hospitalized for acute cholecystitis    H/O diabetic neuropathy    H/O: hysterectomy  1990    History of biliary stent insertion  11/2018 &amp; removal 7/2/19    S/P arteriovenous (AV) fistula creation  05/17/2019    Cataract  IOL b/l        FAMILY HISTORY:  No pertinent family history in first degree relatives        SOCIAL HISTORY:  Smoking: [ ] Never Smoked [ ] Former Smoker (__ packs x ___ years) [ ] Current Smoker  (__ packs x ___ years)  Substance Use: [ ] Never Used [ ] Used ____  EtOH Use:  Marital Status: [ ] Single [ ]  [ ]  [ ]   Sexual History:   Occupation:  Recent Travel:  Country of Birth:  Advance Directives:    Allergies    ertapenem (Urticaria)  Purell (Rash)    Intolerances        HOME MEDICATIONS:    REVIEW OF SYSTEMS:            OBJECTIVE:  ICU Vital Signs Last 24 Hrs  T(C): 36.4 (12 Apr 2021 12:40), Max: 36.4 (12 Apr 2021 12:40)  T(F): 97.5 (12 Apr 2021 12:40), Max: 97.5 (12 Apr 2021 12:40)  HR: 81 (12 Apr 2021 12:40) (67 - 81)  BP: 189/87 (12 Apr 2021 12:40) (178/78 - 189/87)  BP(mean): --  ABP: --  ABP(mean): --  RR: 20 (12 Apr 2021 12:40) (20 - 24)  SpO2: 94% (12 Apr 2021 12:40) (93% - 100%)        CAPILLARY BLOOD GLUCOSE      POCT Blood Glucose.: 315 mg/dL (12 Apr 2021 12:38)      PHYSICAL EXAM:        HOSPITAL MEDICATIONS:  MEDICATIONS  (STANDING):    MEDICATIONS  (PRN):  sodium chloride 0.9% Bolus. 100 milliLiter(s) IV Bolus every 5 minutes PRN SBP LESS THAN or EQUAL to 90 mmHg      LABS:                        10.9   14.16 )-----------( 316      ( 12 Apr 2021 09:59 )             35.9     Hgb Trend: 10.9<--  04-12    133<L>  |  100  |  61<H>  ----------------------------<  266<H>  6.8<HH>   |  26  |  6.70<H>    Ca    8.6      12 Apr 2021 09:59  Mg     2.4     04-12    TPro  8.3  /  Alb  3.1<L>  /  TBili  0.4  /  DBili  x   /  AST  11<L>  /  ALT  14  /  AlkPhos  91  04-12    Creatinine Trend: 6.70<--  PT/INR - ( 12 Apr 2021 09:59 )   PT: 13.8 sec;   INR: 1.19 ratio         PTT - ( 12 Apr 2021 09:59 )  PTT:34.3 sec    Arterial Blood Gas:  04-12 @ 10:52  7.30/50/119/23/97/-1.5  ABG lactate: --        MICROBIOLOGY:     RADIOLOGY:  [ ] Reviewed and interpreted by me    EKG:        Madelin ANP-BC (ext. [id number])           CHIEF COMPLAINT:    HPI:  71 yr female with PMHx CAD s/p stents '07, HTN, MI, PAF, liver abscess, s/p biliary stent with removal (11/2018; 7/2/19), chronic pancreatitis, DM2 on insulin, neuropathy, ESRD (5/2018) on HD (M/W/F) who presented this morning (4/12/21) via EMS from home with progressive SOB and found to be in hypoxic resp failure with SPO2 of 83% on Rm Air.      In E.D. pt initially placed on NRB with improvement of SPO2 99% with eventual placement of bipap therapy secondary to increased WOB and hypercapnic resp failure with ph 7.30 PCO2 of 50 serum HCO3 of 26, CXR with bilat opacities. Pt also found to have hyperkalemia of 6.8 without ECG changes, hyperglycemic of 266, WBC of 14.2, BNP 37207. Pt received lokelma 10gms po, Reg insuline 10 units IV x1.        Consult called for hypoxic/hypercapnic resp failure and hyperkalemia secondary to ESRD                PAST MEDICAL & SURGICAL HISTORY:  Hypertension    Myocardial infarction  2007    Diabetes  Type 2 DM    CAD (coronary artery disease)  s/p stent x 1 in 2007    ESRD (end stage renal disease) on dialysis  MWF since 05/2018    Liver abscess  resolved    Acute urinary retention    Cholecystitis with cholangitis    Chronic pancreatitis    PAF (paroxysmal atrial fibrillation)  1 episode in 11/2018 while hospitalized for acute cholecystitis    H/O diabetic neuropathy    H/O: hysterectomy  1990    History of biliary stent insertion  11/2018 &amp; removal 7/2/19    S/P arteriovenous (AV) fistula creation  05/17/2019    Cataract  IOL b/l        FAMILY HISTORY:  No pertinent family history in first degree relatives        SOCIAL HISTORY:  Smoking: [ ] Never Smoked [ ] Former Smoker (__ packs x ___ years) [ ] Current Smoker  (__ packs x ___ years)  Substance Use: [ ] Never Used [ ] Used ____  EtOH Use:  Marital Status: [ ] Single [ ]  [ ]  [ ]   Sexual History:   Occupation:  Recent Travel:  Country of Birth:  Advance Directives:    Allergies    ertapenem (Urticaria)  Purell (Rash)    Intolerances        HOME MEDICATIONS:    REVIEW OF SYSTEMS:            OBJECTIVE:  ICU Vital Signs Last 24 Hrs  T(C): 36.4 (12 Apr 2021 12:40), Max: 36.4 (12 Apr 2021 12:40)  T(F): 97.5 (12 Apr 2021 12:40), Max: 97.5 (12 Apr 2021 12:40)  HR: 81 (12 Apr 2021 12:40) (67 - 81)  BP: 189/87 (12 Apr 2021 12:40) (178/78 - 189/87)  BP(mean): --  ABP: --  ABP(mean): --  RR: 20 (12 Apr 2021 12:40) (20 - 24)  SpO2: 94% (12 Apr 2021 12:40) (93% - 100%)        CAPILLARY BLOOD GLUCOSE      POCT Blood Glucose.: 315 mg/dL (12 Apr 2021 12:38)      PHYSICAL EXAM:        HOSPITAL MEDICATIONS:  MEDICATIONS  (STANDING):    MEDICATIONS  (PRN):  sodium chloride 0.9% Bolus. 100 milliLiter(s) IV Bolus every 5 minutes PRN SBP LESS THAN or EQUAL to 90 mmHg      LABS:                        10.9   14.16 )-----------( 316      ( 12 Apr 2021 09:59 )             35.9     Hgb Trend: 10.9<--  04-12    133<L>  |  100  |  61<H>  ----------------------------<  266<H>  6.8<HH>   |  26  |  6.70<H>    Ca    8.6      12 Apr 2021 09:59  Mg     2.4     04-12    TPro  8.3  /  Alb  3.1<L>  /  TBili  0.4  /  DBili  x   /  AST  11<L>  /  ALT  14  /  AlkPhos  91  04-12    Creatinine Trend: 6.70<--  PT/INR - ( 12 Apr 2021 09:59 )   PT: 13.8 sec;   INR: 1.19 ratio         PTT - ( 12 Apr 2021 09:59 )  PTT:34.3 sec    Arterial Blood Gas:  04-12 @ 10:52  7.30/50/119/23/97/-1.5  ABG lactate: --        MICROBIOLOGY:     RADIOLOGY:  [ ] Reviewed and interpreted by me    EKG:        Madelin ANP-BC (ext. [id number])           CHIEF COMPLAINT:    HPI:  71 yr female with PMHx CAD s/p stents '07, HTN, MI, PAF, liver abscess, s/p biliary stent with removal (11/2018; 7/2/19), chronic pancreatitis, DM2 on insulin, neuropathy, ESRD (5/2018) on HD (M/W/F) who presented this morning (4/12/21) via EMS from home with progressive SOB and found to be in hypoxic resp failure with SPO2 of 83% on Rm Air.      In E.D. pt initially placed on NRB with improvement of SPO2 99% with eventual placement of bipap therapy secondary to increased WOB and hypercapnic resp failure with ph 7.30 PCO2 of 50 serum HCO3 of 26, CXR with bilat opacities. Pt also found to have hyperkalemia of 6.8 without ECG changes, hyperglycemic of 266, WBC of 14.2, BNP 60599. Pt received lokelma 10gms po, Reg insuline 10 units IV x1.        Consult called for hypoxic/hypercapnic resp failure and hyperkalemia secondary to ESRD                PAST MEDICAL & SURGICAL HISTORY:  Hypertension    Myocardial infarction  2007    Diabetes  Type 2 DM    CAD (coronary artery disease)  s/p stent x 1 in 2007    ESRD (end stage renal disease) on dialysis  MWF since 05/2018    Liver abscess  resolved    Acute urinary retention    Cholecystitis with cholangitis    Chronic pancreatitis    PAF (paroxysmal atrial fibrillation)  1 episode in 11/2018 while hospitalized for acute cholecystitis    H/O diabetic neuropathy    H/O: hysterectomy  1990    History of biliary stent insertion  11/2018 &amp; removal 7/2/19    S/P arteriovenous (AV) fistula creation  05/17/2019    Cataract  IOL b/l        FAMILY HISTORY:  No pertinent family history in first degree relatives        SOCIAL HISTORY:  Smoking: [ ] Never Smoked [ ] Former Smoker (__ packs x ___ years) [ ] Current Smoker  (__ packs x ___ years)  Substance Use: [ ] Never Used [ ] Used ____  EtOH Use:  Marital Status: [ ] Single [ ]  [ ]  [ ]   Sexual History:   Occupation:  Recent Travel:  Country of Birth:  Advance Directives:    Allergies    ertapenem (Urticaria)  Purell (Rash)    Intolerances        HOME MEDICATIONS:    REVIEW OF SYSTEMS:    CONSTITUTIONAL:           No weakness, fevers or chills  EYES/ENT:           No visual changes;  No vertigo or throat pain   NECK:            No pain or stiffness  RESPIRATORY:            No cough, wheezing, hemoptysis; + shortness of breath  CARDIOVASCULAR:            No chest pain or palpitations  GASTROINTESTINAL:           No abdominal or epigastric pain. No nausea, vomiting, or hematemesis; No diarrhea or constipation. No melena or hematochezia.  GENITOURINARY:            No dysuria, frequency or hematuria  NEUROLOGICAL:            No numbness or weakness  SKIN:            No pruritis , rashes          OBJECTIVE:  ICU Vital Signs Last 24 Hrs  T(C): 36.4 (12 Apr 2021 12:40), Max: 36.4 (12 Apr 2021 12:40)  T(F): 97.5 (12 Apr 2021 12:40), Max: 97.5 (12 Apr 2021 12:40)  HR: 81 (12 Apr 2021 12:40) (67 - 81)  BP: 189/87 (12 Apr 2021 12:40) (178/78 - 189/87)  BP(mean): --  ABP: --  ABP(mean): --  RR: 20 (12 Apr 2021 12:40) (20 - 24)  SpO2: 94% (12 Apr 2021 12:40) (93% - 100%)        CAPILLARY BLOOD GLUCOSE      POCT Blood Glucose.: 315 mg/dL (12 Apr 2021 12:38)    VITAL SIGNS AT TIME OF CONSULT  BP: 180/90  ; HR: 64   ; RR: 16  ; SPO2: 99% (Bipap 14 Ipap 14 Epap 7 25% FIO2)  ; Temp:      PHYSICAL EXAM:  GENERAL:   slight resp distress with increased WOB, obese  HEAD:    Atraumatic, Normocephalic  EYES:   EOMI, PERRLA 3 mm, conjunctiva and sclera clear  ENMT:   No oropharyngeal exudates, erythema or lesions,  Moist mucous membranes  NECK:   Supple, no cervical lymphadenopathy, no JVD  NERVOUS SYSTEM:   Alert & Oriented 1 to 2 (able to state hospital with choices), CN II-XII intact, Moves all extremities  ; Upper extremities  4/5; Lower extremities 3/5, full sensation to light touch   CHEST/LUNG:   utilizing  Bipap therapy rate 14 IPAP 14 EPAP 7 FIO2 25% . Breath sounds bilaterally   with crackles bases to 1/3 up Rt, to 1/4 left, SpVt of 450 -490, without rhonchi, wheezes, rubs. able to take deep breaths spontaneously and upon command.   HEART:   cardiac monitor NSR without ectopy   ; S1/S2 without murmurs, without rubs, or gallops.   ABDOMEN:   Soft, Nontender, distended; Bowel sounds present, Bladder non distended, non palpable  EXTREMITIES:   Pulses palpable radial pulses 2+ bilat, DP/PT 1+/1+ bilat, without clubbing, cyanosis. Digits warm to touch with good cap refill < 3 secs  SKIN:   warm, dry, intact, normal color, no rash or abnormal lesions, without palpable nodes      HOSPITAL MEDICATIONS:  MEDICATIONS  (STANDING):    MEDICATIONS  (PRN):  sodium chloride 0.9% Bolus. 100 milliLiter(s) IV Bolus every 5 minutes PRN SBP LESS THAN or EQUAL to 90 mmHg      LABS:                        10.9   14.16 )-----------( 316      ( 12 Apr 2021 09:59 )             35.9     Hgb Trend: 10.9<--  04-12    133<L>  |  100  |  61<H>  ----------------------------<  266<H>  6.8<HH>   |  26  |  6.70<H>    Ca    8.6      12 Apr 2021 09:59  Mg     2.4     04-12    TPro  8.3  /  Alb  3.1<L>  /  TBili  0.4  /  DBili  x   /  AST  11<L>  /  ALT  14  /  AlkPhos  91  04-12    Creatinine Trend: 6.70<--  PT/INR - ( 12 Apr 2021 09:59 )   PT: 13.8 sec;   INR: 1.19 ratio         PTT - ( 12 Apr 2021 09:59 )  PTT:34.3 sec    Arterial Blood Gas:  04-12 @ 10:52  7.30/50/119/23/97/-1.5  ABG lactate: --        MICROBIOLOGY:     RADIOLOGY:  [ ] Reviewed and interpreted by me    EKG:        Madelin ANP-BC (ext. [id number])

## 2021-04-12 NOTE — ED PROVIDER NOTE - CLINICAL SUMMARY MEDICAL DECISION MAKING FREE TEXT BOX
Pt is a 70 yo female ESRD here for sob probable fluid overload o2 sat 100% on nonrebreather will place on bipap give nitro get cxr labs

## 2021-04-12 NOTE — CONSULT NOTE ADULT - ASSESSMENT
ESRD on HD  Pulmonary edema  Hypertension  Diabetes  Hyperkalemia    On BIPAP. HD today. Fluid removal as tolerated by BP. PO fluid restriction. Pt with very poor out pt compliance with meds and diet. Monitor blood sugar levels. Insulin coverage as needed. Monitor BP trend. Titrate BP meds as needed. Salt restriction. Further recommendations pending clinical course. Thank you for the courtesy of this referral.

## 2021-04-12 NOTE — CONSULT NOTE ADULT - ATTENDING COMMENTS
71F PMH HTN, HLD, DM2, ESRD on dialysis, CAD s/p PCI (2007), anemia of chronic kidney disease, and history of acute cholecystitis/ascending cholangitis with ESBL E. coli bacteremia in 2018 s/p percutanous cholecystostomy and biliary stent s/p removal who now presents with worsening dyspnea, found to have acute hypoxemic respiratory failure requiring BiPAP. Now improved with dialysis.    1. Neuro: stable neuro status  2. CV: HD stable, fluid removal with dialysis, restart aspirin, atorvastatin, Imdur, metoprolol, and nifedipine  3. Pulm: improved hypoxemia and respiratory distress with BiPAP and fluid removal. Supplemental oxygen with NC, goal SpO2>90%  4. GI: start renal diet if able to come off BiPAP  5. Renal: ESRD on dialysis, s/p HD today with 2.7L fluid removal  6. ID: no evidence of infection, observe off antibiotics  7. Heme: start HSQ for DVT ppx  8. Endo: DM2, check a1c, start Lantus 20 qhs + Lispro 5 tid ac  9. Skin: no lines or aguilar, has left forearm AV fistula  10. Dispo: full code, discussed with granddaughter on phone  CC time spent: 35 min

## 2021-04-12 NOTE — ED ADULT TRIAGE NOTE - CHIEF COMPLAINT QUOTE
pt from home, called ems for diff breathing, pt with labored breathing and using accessory muscles, pt tachypneic with exp wheezing, ems arrival sats 83% rrom air, on NRB pt from home, called ems for diff breathing, pt with labored breathing and using accessory muscles, pt tachypneic with exp wheezing, ems arrival sats 83% rrom air, on NRB, fully vaccinated

## 2021-04-13 DIAGNOSIS — R06.02 SHORTNESS OF BREATH: ICD-10-CM

## 2021-04-13 DIAGNOSIS — N18.6 END STAGE RENAL DISEASE: ICD-10-CM

## 2021-04-13 DIAGNOSIS — I10 ESSENTIAL (PRIMARY) HYPERTENSION: ICD-10-CM

## 2021-04-13 DIAGNOSIS — E11.9 TYPE 2 DIABETES MELLITUS WITHOUT COMPLICATIONS: ICD-10-CM

## 2021-04-13 LAB
A1C WITH ESTIMATED AVERAGE GLUCOSE RESULT: 8.7 % — HIGH (ref 4–5.6)
AMMONIA BLD-MCNC: 27 UMOL/L — SIGNIFICANT CHANGE UP (ref 11–32)
ANION GAP SERPL CALC-SCNC: 7 MMOL/L — SIGNIFICANT CHANGE UP (ref 5–17)
BUN SERPL-MCNC: 28 MG/DL — HIGH (ref 7–23)
CALCIUM SERPL-MCNC: 8.7 MG/DL — SIGNIFICANT CHANGE UP (ref 8.5–10.1)
CHLORIDE SERPL-SCNC: 97 MMOL/L — SIGNIFICANT CHANGE UP (ref 96–108)
CK SERPL-CCNC: 58 U/L — SIGNIFICANT CHANGE UP (ref 26–192)
CO2 SERPL-SCNC: 31 MMOL/L — SIGNIFICANT CHANGE UP (ref 22–31)
COVID-19 SPIKE DOMAIN AB INTERP: POSITIVE
COVID-19 SPIKE DOMAIN ANTIBODY RESULT: >250 U/ML — HIGH
CREAT SERPL-MCNC: 4.8 MG/DL — HIGH (ref 0.5–1.3)
ESTIMATED AVERAGE GLUCOSE: 203 MG/DL — HIGH (ref 68–114)
GLUCOSE SERPL-MCNC: 173 MG/DL — HIGH (ref 70–99)
HBV SURFACE AB SER-ACNC: <3 MIU/ML — LOW
HBV SURFACE AG SER-ACNC: SIGNIFICANT CHANGE UP
HCT VFR BLD CALC: 33.8 % — LOW (ref 34.5–45)
HCV AB S/CO SERPL IA: 0.22 S/CO — SIGNIFICANT CHANGE UP (ref 0–0.99)
HCV AB SERPL-IMP: SIGNIFICANT CHANGE UP
HGB BLD-MCNC: 10.3 G/DL — LOW (ref 11.5–15.5)
MAGNESIUM SERPL-MCNC: 2.2 MG/DL — SIGNIFICANT CHANGE UP (ref 1.6–2.6)
MCHC RBC-ENTMCNC: 28.6 PG — SIGNIFICANT CHANGE UP (ref 27–34)
MCHC RBC-ENTMCNC: 30.5 GM/DL — LOW (ref 32–36)
MCV RBC AUTO: 93.9 FL — SIGNIFICANT CHANGE UP (ref 80–100)
NRBC # BLD: 0 /100 WBCS — SIGNIFICANT CHANGE UP (ref 0–0)
PLATELET # BLD AUTO: 301 K/UL — SIGNIFICANT CHANGE UP (ref 150–400)
POTASSIUM SERPL-MCNC: 4.5 MMOL/L — SIGNIFICANT CHANGE UP (ref 3.5–5.3)
POTASSIUM SERPL-SCNC: 4.5 MMOL/L — SIGNIFICANT CHANGE UP (ref 3.5–5.3)
PROCALCITONIN SERPL-MCNC: 0.61 NG/ML — HIGH (ref 0–0.04)
RBC # BLD: 3.6 M/UL — LOW (ref 3.8–5.2)
RBC # FLD: 13.8 % — SIGNIFICANT CHANGE UP (ref 10.3–14.5)
SARS-COV-2 IGG+IGM SERPL QL IA: >250 U/ML — HIGH
SARS-COV-2 IGG+IGM SERPL QL IA: POSITIVE
SODIUM SERPL-SCNC: 135 MMOL/L — SIGNIFICANT CHANGE UP (ref 135–145)
TROPONIN I SERPL-MCNC: 0.05 NG/ML — HIGH (ref 0.01–0.04)
TSH SERPL-MCNC: 1.01 UIU/ML — SIGNIFICANT CHANGE UP (ref 0.36–3.74)
WBC # BLD: 14.41 K/UL — HIGH (ref 3.8–10.5)
WBC # FLD AUTO: 14.41 K/UL — HIGH (ref 3.8–10.5)

## 2021-04-13 PROCEDURE — 70450 CT HEAD/BRAIN W/O DYE: CPT | Mod: 26

## 2021-04-13 PROCEDURE — 71250 CT THORAX DX C-: CPT | Mod: 26

## 2021-04-13 RX ORDER — ASPIRIN/CALCIUM CARB/MAGNESIUM 324 MG
325 TABLET ORAL DAILY
Refills: 0 | Status: DISCONTINUED | OUTPATIENT
Start: 2021-04-13 | End: 2021-04-15

## 2021-04-13 RX ORDER — ACETAMINOPHEN 500 MG
650 TABLET ORAL EVERY 6 HOURS
Refills: 0 | Status: DISCONTINUED | OUTPATIENT
Start: 2021-04-13 | End: 2021-04-23

## 2021-04-13 RX ORDER — GABAPENTIN 400 MG/1
100 CAPSULE ORAL THREE TIMES A DAY
Refills: 0 | Status: DISCONTINUED | OUTPATIENT
Start: 2021-04-13 | End: 2021-04-14

## 2021-04-13 RX ADMIN — Medication 650 MILLIGRAM(S): at 17:32

## 2021-04-13 RX ADMIN — HEPARIN SODIUM 5000 UNIT(S): 5000 INJECTION INTRAVENOUS; SUBCUTANEOUS at 22:15

## 2021-04-13 RX ADMIN — HEPARIN SODIUM 5000 UNIT(S): 5000 INJECTION INTRAVENOUS; SUBCUTANEOUS at 05:36

## 2021-04-13 RX ADMIN — GABAPENTIN 300 MILLIGRAM(S): 400 CAPSULE ORAL at 15:16

## 2021-04-13 RX ADMIN — Medication 50 MILLIGRAM(S): at 17:32

## 2021-04-13 RX ADMIN — Medication 30 MILLIGRAM(S): at 05:37

## 2021-04-13 RX ADMIN — INSULIN GLARGINE 20 UNIT(S): 100 INJECTION, SOLUTION SUBCUTANEOUS at 22:15

## 2021-04-13 RX ADMIN — HEPARIN SODIUM 5000 UNIT(S): 5000 INJECTION INTRAVENOUS; SUBCUTANEOUS at 15:16

## 2021-04-13 RX ADMIN — Medication 5 UNIT(S): at 07:53

## 2021-04-13 RX ADMIN — Medication 5 UNIT(S): at 17:34

## 2021-04-13 RX ADMIN — Medication 3 MILLILITER(S): at 07:55

## 2021-04-13 RX ADMIN — GABAPENTIN 300 MILLIGRAM(S): 400 CAPSULE ORAL at 05:37

## 2021-04-13 RX ADMIN — ISOSORBIDE MONONITRATE 60 MILLIGRAM(S): 60 TABLET, EXTENDED RELEASE ORAL at 12:15

## 2021-04-13 RX ADMIN — Medication 3 MILLILITER(S): at 13:42

## 2021-04-13 RX ADMIN — Medication 81 MILLIGRAM(S): at 12:14

## 2021-04-13 RX ADMIN — Medication 5 UNIT(S): at 12:14

## 2021-04-13 RX ADMIN — GABAPENTIN 100 MILLIGRAM(S): 400 CAPSULE ORAL at 22:15

## 2021-04-13 RX ADMIN — ATORVASTATIN CALCIUM 80 MILLIGRAM(S): 80 TABLET, FILM COATED ORAL at 22:15

## 2021-04-13 RX ADMIN — Medication 50 MILLIGRAM(S): at 05:37

## 2021-04-13 NOTE — PROGRESS NOTE ADULT - SUBJECTIVE AND OBJECTIVE BOX
Patient is a 71y old  Female who presents with a chief complaint of   Patient seen in follow up for ESRD on HD, SOB.        PAST MEDICAL HISTORY:  Diabetes    Hypertension    Myocardial infarction    Pneumonia    Hypertension    CRF (chronic renal failure)    Diabetes    CAD (coronary artery disease)    ESRD (end stage renal disease) on dialysis    Liver abscess    Acute urinary retention    Cholecystitis with cholangitis    Chronic pancreatitis    PAF (paroxysmal atrial fibrillation)    H/O diabetic neuropathy      MEDICATIONS  (STANDING):  albuterol/ipratropium for Nebulization 3 milliLiter(s) Nebulizer every 6 hours  aspirin enteric coated 81 milliGRAM(s) Oral daily  atorvastatin 80 milliGRAM(s) Oral at bedtime  gabapentin 300 milliGRAM(s) Oral every 8 hours  heparin   Injectable 5000 Unit(s) SubCutaneous every 8 hours  insulin glargine Injectable (LANTUS) 20 Unit(s) SubCutaneous at bedtime  insulin lispro (ADMELOG) corrective regimen sliding scale   SubCutaneous every 4 hours  insulin lispro Injectable (ADMELOG) 5 Unit(s) SubCutaneous before breakfast  insulin lispro Injectable (ADMELOG) 5 Unit(s) SubCutaneous before lunch  insulin lispro Injectable (ADMELOG) 5 Unit(s) SubCutaneous before dinner  isosorbide   mononitrate ER Tablet (IMDUR) 60 milliGRAM(s) Oral daily  metoprolol tartrate 50 milliGRAM(s) Oral two times a day  NIFEdipine XL 30 milliGRAM(s) Oral daily    MEDICATIONS  (PRN):  sodium chloride 0.9% Bolus. 100 milliLiter(s) IV Bolus every 5 minutes PRN SBP LESS THAN or EQUAL to 90 mmHg    T(C): 36.7 (04-13-21 @ 04:16), Max: 36.9 (04-12-21 @ 19:38)  HR: 75 (04-13-21 @ 04:16) (67 - 90)  BP: 119/69 (04-13-21 @ 04:16) (111/71 - 189/87)  RR: 19 (04-13-21 @ 04:16) (19 - 41)  SpO2: 97% (04-13-21 @ 04:16) (90% - 100%)  Wt(kg): --  I&O's Detail    12 Apr 2021 07:01  -  13 Apr 2021 07:00  --------------------------------------------------------  IN:    Oral Fluid: 30 mL  Total IN: 30 mL    OUT:    Other (mL): 2700 mL  Total OUT: 2700 mL    Total NET: -2670 mL          PHYSICAL EXAM:  General: No distress  Respiratory: b/l air entry  Cardiovascular: S1 S2  Gastrointestinal: soft  Extremities:  edema, left AVF                              10.3   14.41 )-----------( 301      ( 13 Apr 2021 07:09 )             33.8     04-13    135  |  97  |  28<H>  ----------------------------<  173<H>  4.5   |  31  |  4.80<H>    Ca    8.7      13 Apr 2021 07:09  Phos  2.8     04-12  Mg     2.2     04-13    TPro  9.4<H>  /  Alb  3.4  /  TBili  0.5  /  DBili  x   /  AST  14<L>  /  ALT  20  /  AlkPhos  99  04-12    CARDIAC MARKERS ( 13 Apr 2021 07:09 )  .047 ng/mL / x     / 58 U/L / x     / x      CARDIAC MARKERS ( 12 Apr 2021 18:29 )  .044 ng/mL / x     / 50 U/L / x     / 2.2 ng/mL  CARDIAC MARKERS ( 12 Apr 2021 09:59 )  .036 ng/mL / x     / x     / x     / 1.8 ng/mL      LIVER FUNCTIONS - ( 12 Apr 2021 18:29 )  Alb: 3.4 g/dL / Pro: 9.4 g/dL / ALK PHOS: 99 U/L / ALT: 20 U/L / AST: 14 U/L / GGT: x             ABG - ( 12 Apr 2021 10:52 )  pH, Arterial: 7.30  pH, Blood: x     /  pCO2: 50    /  pO2: 119   / HCO3: 23    / Base Excess: -1.5  /  SaO2: 97                Sodium, Serum: 135 (04-13 @ 07:09)  Sodium, Serum: 135 (04-12 @ 18:29)  Sodium, Serum: 133 (04-12 @ 09:59)    Creatinine, Serum: 4.80 (04-13 @ 07:09)  Creatinine, Serum: 2.40 (04-12 @ 18:29)  Creatinine, Serum: 6.70 (04-12 @ 09:59)    Potassium, Serum: 4.5 (04-13 @ 07:09)  Potassium, Serum: 3.5 (04-12 @ 18:29)  Potassium, Serum: 6.8 (04-12 @ 09:59)    Hemoglobin: 10.3 (04-13 @ 07:09)  Hemoglobin: 11.6 (04-12 @ 18:08)  Hemoglobin: 10.9 (04-12 @ 09:59)

## 2021-04-13 NOTE — PROGRESS NOTE ADULT - ASSESSMENT
ESRD on HD  Pulmonary edema  Hypertension  Diabetes  Hyperkalemia    Clinically better. Next HD tomorrow. PO fluid restriction. Pt with very poor out pt compliance with meds and diet. Add lokelma.   Monitor blood sugar levels. Insulin coverage as needed. Monitor BP trend. Titrate BP meds as needed. Salt restriction.

## 2021-04-13 NOTE — CONSULT NOTE ADULT - SUBJECTIVE AND OBJECTIVE BOX
[facility] [place]     Patient is a 71y old  Female who presents with a chief complaint of SOB (13 Apr 2021 11:51)      HPI:  71 yr female with PMHx CAD s/p stents '07, HTN, MI, PAF, liver abscess, s/p biliary stent with removal (11/2018; 7/2/19), chronic pancreatitis, DM2 on insulin, neuropathy, ESRD (5/2018) on HD (M/W/F) who presented this morning (4/12/21) via EMS from home with progressive SOB and found to be in hypoxic resp failure with SPO2 of 83% on Rm Air.      In E.D. pt initially placed on NRB with improvement of SPO2 99% with eventual placement of bipap therapy secondary to increased WOB and hypercapnic resp failure with ph 7.30 PCO2 of 50 serum HCO3 of 26, CXR with bilat opacities. Pt also found to have hyperkalemia of 6.8 without ECG changes, hyperglycemic of 266, WBC of 14.2, BNP 40026. Pt received lokelma 10gms po, Reg insuline 10 units IV x1.        Consult called for hypoxic/hypercapnic resp failure and hyperkalemia secondary to ESRD   (12 Apr 2021 15:40)      HPI:    71y Female for Cardiology Consult    PAST MEDICAL & SURGICAL HISTORY:  Hypertension    Myocardial infarction  2007    Diabetes  Type 2 DM    CAD (coronary artery disease)  s/p stent x 1 in 2007    ESRD (end stage renal disease) on dialysis  MWF since 05/2018    Liver abscess  resolved    Acute urinary retention    Cholecystitis with cholangitis    Chronic pancreatitis    PAF (paroxysmal atrial fibrillation)  1 episode in 11/2018 while hospitalized for acute cholecystitis    H/O diabetic neuropathy    H/O: hysterectomy  1990    History of biliary stent insertion  11/2018 &amp; removal 7/2/19    S/P arteriovenous (AV) fistula creation  05/17/2019    Cataract  IOL b/l        FAMILY HISTORY:  No pertinent family history in first degree relatives        SOCIAL HISTORY:   Alcohol:  Smoking:    Allergies    ertapenem (Urticaria)  Purell (Rash)    Intolerances        MEDICATIONS  (STANDING):  albuterol/ipratropium for Nebulization 3 milliLiter(s) Nebulizer every 6 hours  aspirin enteric coated 325 milliGRAM(s) Oral daily  atorvastatin 80 milliGRAM(s) Oral at bedtime  gabapentin 100 milliGRAM(s) Oral three times a day  heparin   Injectable 5000 Unit(s) SubCutaneous every 8 hours  insulin glargine Injectable (LANTUS) 20 Unit(s) SubCutaneous at bedtime  insulin lispro (ADMELOG) corrective regimen sliding scale   SubCutaneous every 4 hours  insulin lispro Injectable (ADMELOG) 5 Unit(s) SubCutaneous before breakfast  insulin lispro Injectable (ADMELOG) 5 Unit(s) SubCutaneous before lunch  insulin lispro Injectable (ADMELOG) 5 Unit(s) SubCutaneous before dinner  isosorbide   mononitrate ER Tablet (IMDUR) 60 milliGRAM(s) Oral daily  metoprolol tartrate 50 milliGRAM(s) Oral two times a day  NIFEdipine XL 30 milliGRAM(s) Oral daily    MEDICATIONS  (PRN):  acetaminophen   Tablet .. 650 milliGRAM(s) Oral every 6 hours PRN Mild Pain (1 - 3)  sodium chloride 0.9% Bolus. 100 milliLiter(s) IV Bolus every 5 minutes PRN SBP LESS THAN or EQUAL to 90 mmHg      REVIEW OF SYSTEMS:  CONSTITUTIONAL: No fever, weight loss, chills, shakes, or fat  RESPIRATORY: No cough, wheezing, hemoptysis, or shortness of breath  CARDIOVASCULAR: No chest pain, dyspnea, palpitations, dizziness, syncope, paroxysmal nocturnal dyspnea, orthopnea, or arm or leg swelling  GASTROINTESTINAL: No abdominal  or epigastric pain, nausea, vomiting, hematemesis, diarrhea, constipation, melena or bright red bloo  NEUROLOGICAL: No headaches, memory loss, slurred speech, limb weakness, loss of strength, numbness, or tremors  SKIN: No itching, burning, rashes, or lesions  ENDOCRINE: No heat or cold intolerance, or hair loss  MUSCULOSKELETAL: No joint pain or swelling, muscle, back, or extremity pain  HEME/LYMPH: No easy bruising or bleeding gums  ALLERY AND IMMUNOLOGIC: No hives or rash.    Vital Signs Last 24 Hrs  T(C): 36.7 (13 Apr 2021 19:16), Max: 36.7 (13 Apr 2021 04:16)  T(F): 98 (13 Apr 2021 19:16), Max: 98 (13 Apr 2021 04:16)  HR: 74 (13 Apr 2021 19:16) (69 - 75)  BP: 109/66 (13 Apr 2021 19:16) (109/66 - 123/68)  BP(mean): --  RR: 19 (13 Apr 2021 19:16) (19 - 20)  SpO2: 98% (13 Apr 2021 19:16) (97% - 100%)    PHYSICAL EXAM:  HEAD:  Atraumatic, Normocephalic  EYES: EOMI, PERRLA, conjunctiva and sclera clear  NECK: Supple and normal thyroid.  No JVD or carotid bruit.   HEART: S1, S2 regular , 1/6 soft ejection systolic murmur in mitral area , no thrill and no gallops .  PULMONARY: Bilateral vesicular breathing , few scattered ronchi and few scattered rales are present .  ABDOMEN: Soft nontender and positive bowl sounds   EXTREMITIES:  No clubbing, cyanosis, or pedal  edema  NEUROLOGICAL: AAOX3 , no focal deficit .    LABS:                        10.3   14.41 )-----------( 301      ( 13 Apr 2021 07:09 )             33.8     04-13    135  |  97  |  28<H>  ----------------------------<  173<H>  4.5   |  31  |  4.80<H>    Ca    8.7      13 Apr 2021 07:09  Phos  2.8     04-12  Mg     2.2     04-13    TPro  9.4<H>  /  Alb  3.4  /  TBili  0.5  /  DBili  x   /  AST  14<L>  /  ALT  20  /  AlkPhos  99  04-12    CARDIAC MARKERS ( 13 Apr 2021 07:09 )  .047 ng/mL / x     / 58 U/L / x     / x      CARDIAC MARKERS ( 12 Apr 2021 18:29 )  .044 ng/mL / x     / 50 U/L / x     / 2.2 ng/mL  CARDIAC MARKERS ( 12 Apr 2021 09:59 )  .036 ng/mL / x     / x     / x     / 1.8 ng/mL      PT/INR - ( 12 Apr 2021 18:08 )   PT: 13.6 sec;   INR: 1.17 ratio         PTT - ( 12 Apr 2021 18:08 )  PTT:31.1 sec    BNP      EKG:  ECHO:  IMAGING:    Assessment and Plan :     Will continue to follow during hospital course and give further recommendations as needed . Thanks for your referral . if any questions please contact me at office [phone] [phone])  YONNY RIVERO MD [place] [id number]  [address]  OFFICE : [phone]  CELL : [phone]    CARDIOLOGY CONSULT :    Patient is a 71y old  Female who presents with a chief complaint of SOB (13 Apr 2021 11:51)      HPI:  71 yr female with PMHx CAD s/p stents '07, HTN, MI, PAF, liver abscess, s/p biliary stent with removal (11/2018; 7/2/19), chronic pancreatitis, DM2 on insulin, neuropathy, ESRD (5/2018) on HD (M/W/F) who presented this morning (4/12/21) via EMS from home with progressive SOB and found to be in hypoxic resp failure with SPO2 of 83% on Rm Air.      In E.D. pt initially placed on NRB with improvement of SPO2 99% with eventual placement of bipap therapy secondary to increased WOB and hypercapnic resp failure with ph 7.30 PCO2 of 50 serum HCO3 of 26, CXR with bilat opacities. Pt also found to have hyperkalemia of 6.8 without ECG changes, hyperglycemic of 266, WBC of 14.2, BNP 40422. Pt received lokelma 10gms po, Reg insuline 10 units IV x1.        Consult called for hypoxic/hypercapnic resp failure and hyperkalemia secondary to ESRD   (12 Apr 2021 15:40)      HPI:    71y Female for Cardiology Consult    PAST MEDICAL & SURGICAL HISTORY:  Hypertension    Myocardial infarction  2007    Diabetes  Type 2 DM    CAD (coronary artery disease)  s/p stent x 1 in 2007    ESRD (end stage renal disease) on dialysis  MWF since 05/2018    Liver abscess  resolved    Acute urinary retention    Cholecystitis with cholangitis    Chronic pancreatitis    PAF (paroxysmal atrial fibrillation)  1 episode in 11/2018 while hospitalized for acute cholecystitis    H/O diabetic neuropathy    H/O: hysterectomy  1990    History of biliary stent insertion  11/2018 &amp; removal 7/2/19    S/P arteriovenous (AV) fistula creation  05/17/2019    Cataract  IOL b/l        FAMILY HISTORY:  No pertinent family history in first degree relatives        SOCIAL HISTORY:   Alcohol:  Smoking:    Allergies    ertapenem (Urticaria)  Purell (Rash)    Intolerances        MEDICATIONS  (STANDING):  albuterol/ipratropium for Nebulization 3 milliLiter(s) Nebulizer every 6 hours  aspirin enteric coated 325 milliGRAM(s) Oral daily  atorvastatin 80 milliGRAM(s) Oral at bedtime  gabapentin 100 milliGRAM(s) Oral three times a day  heparin   Injectable 5000 Unit(s) SubCutaneous every 8 hours  insulin glargine Injectable (LANTUS) 20 Unit(s) SubCutaneous at bedtime  insulin lispro (ADMELOG) corrective regimen sliding scale   SubCutaneous every 4 hours  insulin lispro Injectable (ADMELOG) 5 Unit(s) SubCutaneous before breakfast  insulin lispro Injectable (ADMELOG) 5 Unit(s) SubCutaneous before lunch  insulin lispro Injectable (ADMELOG) 5 Unit(s) SubCutaneous before dinner  isosorbide   mononitrate ER Tablet (IMDUR) 60 milliGRAM(s) Oral daily  metoprolol tartrate 50 milliGRAM(s) Oral two times a day  NIFEdipine XL 30 milliGRAM(s) Oral daily    MEDICATIONS  (PRN):  acetaminophen   Tablet .. 650 milliGRAM(s) Oral every 6 hours PRN Mild Pain (1 - 3)  sodium chloride 0.9% Bolus. 100 milliLiter(s) IV Bolus every 5 minutes PRN SBP LESS THAN or EQUAL to 90 mmHg      Vital Signs Last 24 Hrs  T(C): 36.7 (13 Apr 2021 19:16), Max: 36.7 (13 Apr 2021 04:16)  T(F): 98 (13 Apr 2021 19:16), Max: 98 (13 Apr 2021 04:16)  HR: 74 (13 Apr 2021 19:16) (69 - 75)  BP: 109/66 (13 Apr 2021 19:16) (109/66 - 123/68)  BP(mean): --  RR: 19 (13 Apr 2021 19:16) (19 - 20)  SpO2: 98% (13 Apr 2021 19:16) (97% - 100%)  LABS:                        10.3   14.41 )-----------( 301      ( 13 Apr 2021 07:09 )             33.8     04-13    135  |  97  |  28<H>  ----------------------------<  173<H>  4.5   |  31  |  4.80<H>    Ca    8.7      13 Apr 2021 07:09  Phos  2.8     04-12  Mg     2.2     04-13    TPro  9.4<H>  /  Alb  3.4  /  TBili  0.5  /  DBili  x   /  AST  14<L>  /  ALT  20  /  AlkPhos  99  04-12    CARDIAC MARKERS ( 13 Apr 2021 07:09 )  .047 ng/mL / x     / 58 U/L / x     / x      CARDIAC MARKERS ( 12 Apr 2021 18:29 )  .044 ng/mL / x     / 50 U/L / x     / 2.2 ng/mL  CARDIAC MARKERS ( 12 Apr 2021 09:59 )  .036 ng/mL / x     / x     / x     / 1.8 ng/mL      PT/INR - ( 12 Apr 2021 18:08 )   PT: 13.6 sec;   INR: 1.17 ratio         PTT - ( 12 Apr 2021 18:08 )  PTT:31.1 sec    Assessment and Plan :   FULL CONSULT DICTATED .  71 years old female with H/O ESRD , CAD , CHF and hypertension has increased SOB and fluid over load and has acute on chronic systolic and diastolic heart failure and patient better after hemodialysis . Prognosis guarded . Will repeat Troponin I levels .  Will continue to follow during hospital course and give further recommendations as needed . Thanks for your referral . if any questions please contact me at office ([phone] [phone])

## 2021-04-13 NOTE — CONSULT NOTE ADULT - SUBJECTIVE AND OBJECTIVE BOX
ams h/o resp issues  rec check co2  mri head  decreases gabapentin  wbc note ? urine outpt is so check for uti  check labs  spoke to dtr

## 2021-04-13 NOTE — CHART NOTE - NSCHARTNOTEFT_GEN_A_CORE
consult dictated    Impression:    Acute on Chronic Hypoxic and Hypercapnic respiratory failure  Acute on Chronic Diastolic congestive heart failure  PAF  Coronary artery disease  ESRD    Plan:    Supplemental oxygen  Dialysis as per nephrology  SQ heparin for DVT prophylaxis

## 2021-04-13 NOTE — PROGRESS NOTE ADULT - SUBJECTIVE AND OBJECTIVE BOX
Patient is a 71y old  Female who presents with a chief complaint of SOB    INTERVAL /OVERNIGHT EVENTS: alert awake, denies sob    MEDICATIONS  (STANDING):  albuterol/ipratropium for Nebulization 3 milliLiter(s) Nebulizer every 6 hours  aspirin enteric coated 81 milliGRAM(s) Oral daily  atorvastatin 80 milliGRAM(s) Oral at bedtime  gabapentin 100 milliGRAM(s) Oral three times a day  heparin   Injectable 5000 Unit(s) SubCutaneous every 8 hours  insulin glargine Injectable (LANTUS) 20 Unit(s) SubCutaneous at bedtime  insulin lispro (ADMELOG) corrective regimen sliding scale   SubCutaneous every 4 hours  insulin lispro Injectable (ADMELOG) 5 Unit(s) SubCutaneous before breakfast  insulin lispro Injectable (ADMELOG) 5 Unit(s) SubCutaneous before lunch  insulin lispro Injectable (ADMELOG) 5 Unit(s) SubCutaneous before dinner  isosorbide   mononitrate ER Tablet (IMDUR) 60 milliGRAM(s) Oral daily  metoprolol tartrate 50 milliGRAM(s) Oral two times a day  NIFEdipine XL 30 milliGRAM(s) Oral daily    MEDICATIONS  (PRN):  acetaminophen   Tablet .. 650 milliGRAM(s) Oral every 6 hours PRN Mild Pain (1 - 3)  sodium chloride 0.9% Bolus. 100 milliLiter(s) IV Bolus every 5 minutes PRN SBP LESS THAN or EQUAL to 90 mmHg      Allergies    ertapenem (Urticaria)  Purell (Rash)    Intolerances        REVIEW OF SYSTEMS:  denies    Vital Signs Last 24 Hrs  T(C): 36.5 (13 Apr 2021 11:44), Max: 36.9 (12 Apr 2021 19:38)  T(F): 97.7 (13 Apr 2021 11:44), Max: 98.4 (12 Apr 2021 19:38)  HR: 72 (13 Apr 2021 13:44) (69 - 77)  BP: 123/68 (13 Apr 2021 11:44) (119/69 - 178/74)  BP(mean): 92 (12 Apr 2021 21:00) (92 - 106)  RR: 20 (13 Apr 2021 11:44) (19 - 41)  SpO2: 99% (13 Apr 2021 13:44) (96% - 100%)    PHYSICAL EXAM:  GENERAL: NAD, well-groomed, well-developed  HEAD:  Atraumatic, Normocephalic  EYES: EOMI, PERRLA, conjunctiva and sclera clear  ENMT: No tonsillar erythema, exudates, or enlargement; Moist mucous membranes, Good dentition, No lesions  NECK: Supple, No JVD, Normal thyroid  NERVOUS SYSTEM:  Alert & Oriented X3, Good concentration; Motor Strength 5/5 B/L upper and lower extremities; DTRs 2+ intact and symmetric  CHEST/LUNG: Clear to auscultation bilaterally; No rales, rhonchi, wheezing, or rubs  HEART: Regular rate and rhythm; No murmurs, rubs, or gallops  ABDOMEN: Soft, Nontender, Nondistended; Bowel sounds present  EXTREMITIES:  2+ Peripheral Pulses, No clubbing, cyanosis, or edema  LYMPH: No lymphadenopathy noted  SKIN: No rashes or lesions    LABS:                        10.3   14.41 )-----------( 301      ( 13 Apr 2021 07:09 )             33.8     13 Apr 2021 07:09    135    |  97     |  28     ----------------------------<  173    4.5     |  31     |  4.80     Ca    8.7        13 Apr 2021 07:09  Phos  2.8       12 Apr 2021 18:29  Mg     2.2       13 Apr 2021 07:09    TPro  9.4    /  Alb  3.4    /  TBili  0.5    /  DBili  x      /  AST  14     /  ALT  20     /  AlkPhos  99     12 Apr 2021 18:29    PT/INR - ( 12 Apr 2021 18:08 )   PT: 13.6 sec;   INR: 1.17 ratio         PTT - ( 12 Apr 2021 18:08 )  PTT:31.1 sec    CAPILLARY BLOOD GLUCOSE      POCT Blood Glucose.: 202 mg/dL (13 Apr 2021 17:12)  POCT Blood Glucose.: 128 mg/dL (13 Apr 2021 12:09)  POCT Blood Glucose.: 167 mg/dL (13 Apr 2021 07:46)  POCT Blood Glucose.: 173 mg/dL (13 Apr 2021 06:21)  POCT Blood Glucose.: 152 mg/dL (13 Apr 2021 01:53)  POCT Blood Glucose.: 143 mg/dL (12 Apr 2021 21:06)  POCT Blood Glucose.: 117 mg/dL (12 Apr 2021 18:56)  POCT Blood Glucose.: 89 mg/dL (12 Apr 2021 18:24)      RADIOLOGY & ADDITIONAL TESTS:    Notes Reviewed:  [x ] YES  [ ] NO    Care Discussed with Consultants/Other Providers [ x] YES  [ ] NO

## 2021-04-14 DIAGNOSIS — I63.9 CEREBRAL INFARCTION, UNSPECIFIED: ICD-10-CM

## 2021-04-14 LAB
CHOLEST SERPL-MCNC: 101 MG/DL — SIGNIFICANT CHANGE UP
FOLATE SERPL-MCNC: >20 NG/ML — SIGNIFICANT CHANGE UP
HCT VFR BLD CALC: 32.7 % — LOW (ref 34.5–45)
HDLC SERPL-MCNC: 43 MG/DL — LOW
HGB BLD-MCNC: 10.2 G/DL — LOW (ref 11.5–15.5)
LIPID PNL WITH DIRECT LDL SERPL: 41 MG/DL — SIGNIFICANT CHANGE UP
MAGNESIUM SERPL-MCNC: 2.3 MG/DL — SIGNIFICANT CHANGE UP (ref 1.6–2.6)
MCHC RBC-ENTMCNC: 29.1 PG — SIGNIFICANT CHANGE UP (ref 27–34)
MCHC RBC-ENTMCNC: 31.2 GM/DL — LOW (ref 32–36)
MCV RBC AUTO: 93.4 FL — SIGNIFICANT CHANGE UP (ref 80–100)
NON HDL CHOLESTEROL: 58 MG/DL — SIGNIFICANT CHANGE UP
NRBC # BLD: 0 /100 WBCS — SIGNIFICANT CHANGE UP (ref 0–0)
PLATELET # BLD AUTO: 276 K/UL — SIGNIFICANT CHANGE UP (ref 150–400)
RBC # BLD: 3.5 M/UL — LOW (ref 3.8–5.2)
RBC # FLD: 14 % — SIGNIFICANT CHANGE UP (ref 10.3–14.5)
TRIGL SERPL-MCNC: 83 MG/DL — SIGNIFICANT CHANGE UP
TROPONIN I SERPL-MCNC: 0.04 NG/ML — SIGNIFICANT CHANGE UP (ref 0.01–0.04)
TSH SERPL-MCNC: 1.15 UIU/ML — SIGNIFICANT CHANGE UP (ref 0.36–3.74)
VIT B12 SERPL-MCNC: 607 PG/ML — SIGNIFICANT CHANGE UP (ref 232–1245)
WBC # BLD: 13.08 K/UL — HIGH (ref 3.8–10.5)
WBC # FLD AUTO: 13.08 K/UL — HIGH (ref 3.8–10.5)

## 2021-04-14 PROCEDURE — 70551 MRI BRAIN STEM W/O DYE: CPT | Mod: 26

## 2021-04-14 PROCEDURE — 70544 MR ANGIOGRAPHY HEAD W/O DYE: CPT | Mod: 26,59

## 2021-04-14 PROCEDURE — 93010 ELECTROCARDIOGRAM REPORT: CPT

## 2021-04-14 RX ORDER — METOPROLOL TARTRATE 50 MG
25 TABLET ORAL
Refills: 0 | Status: DISCONTINUED | OUTPATIENT
Start: 2021-04-14 | End: 2021-04-20

## 2021-04-14 RX ORDER — INSULIN GLARGINE 100 [IU]/ML
15 INJECTION, SOLUTION SUBCUTANEOUS AT BEDTIME
Refills: 0 | Status: DISCONTINUED | OUTPATIENT
Start: 2021-04-14 | End: 2021-04-15

## 2021-04-14 RX ADMIN — Medication 30 MILLIGRAM(S): at 08:47

## 2021-04-14 RX ADMIN — Medication 50 MILLIGRAM(S): at 08:48

## 2021-04-14 RX ADMIN — ISOSORBIDE MONONITRATE 60 MILLIGRAM(S): 60 TABLET, EXTENDED RELEASE ORAL at 13:10

## 2021-04-14 RX ADMIN — GABAPENTIN 100 MILLIGRAM(S): 400 CAPSULE ORAL at 13:10

## 2021-04-14 RX ADMIN — INSULIN GLARGINE 15 UNIT(S): 100 INJECTION, SOLUTION SUBCUTANEOUS at 21:40

## 2021-04-14 RX ADMIN — GABAPENTIN 100 MILLIGRAM(S): 400 CAPSULE ORAL at 08:48

## 2021-04-14 RX ADMIN — HEPARIN SODIUM 5000 UNIT(S): 5000 INJECTION INTRAVENOUS; SUBCUTANEOUS at 21:42

## 2021-04-14 RX ADMIN — HEPARIN SODIUM 5000 UNIT(S): 5000 INJECTION INTRAVENOUS; SUBCUTANEOUS at 08:48

## 2021-04-14 RX ADMIN — ATORVASTATIN CALCIUM 80 MILLIGRAM(S): 80 TABLET, FILM COATED ORAL at 21:40

## 2021-04-14 RX ADMIN — HEPARIN SODIUM 5000 UNIT(S): 5000 INJECTION INTRAVENOUS; SUBCUTANEOUS at 13:10

## 2021-04-14 RX ADMIN — Medication 3 MILLILITER(S): at 21:01

## 2021-04-14 RX ADMIN — Medication 325 MILLIGRAM(S): at 13:10

## 2021-04-14 NOTE — CONSULT NOTE ADULT - SUBJECTIVE AND OBJECTIVE BOX
Patient is a 71y old  Female who presents with a chief complaint of SOB (13 Apr 2021 23:52)      Reason For Consult: dm2 uncontrolled    HPI:  71 yr female with PMHx CAD s/p stents '07, HTN, MI, PAF, liver abscess, s/p biliary stent with removal (11/2018; 7/2/19), chronic pancreatitis, DM2 on insulin, neuropathy, ESRD (5/2018) on HD (M/W/F) who presented this morning (4/12/21) via EMS from home with progressive SOB and found to be in hypoxic resp failure with SPO2 of 83% on Rm Air.      In E.D. pt initially placed on NRB with improvement of SPO2 99% with eventual placement of bipap therapy secondary to increased WOB and hypercapnic resp failure with ph 7.30 PCO2 of 50 serum HCO3 of 26, CXR with bilat opacities. Pt also found to have hyperkalemia of 6.8 without ECG changes, hyperglycemic of 266, WBC of 14.2, BNP 27730. Pt received lokelma 10gms po, Reg insuline 10 units IV x1.        Consult called for hypoxic/hypercapnic resp failure and hyperkalemia secondary to ESRD   (12 Apr 2021 15:40)      PAST MEDICAL & SURGICAL HISTORY:  Hypertension    Myocardial infarction  2007    Diabetes  Type 2 DM    CAD (coronary artery disease)  s/p stent x 1 in 2007    ESRD (end stage renal disease) on dialysis  MWF since 05/2018    Liver abscess  resolved    Acute urinary retention    Cholecystitis with cholangitis    Chronic pancreatitis    PAF (paroxysmal atrial fibrillation)  1 episode in 11/2018 while hospitalized for acute cholecystitis    H/O diabetic neuropathy    H/O: hysterectomy  1990    History of biliary stent insertion  11/2018 &amp; removal 7/2/19    S/P arteriovenous (AV) fistula creation  05/17/2019    Cataract  IOL b/l        FAMILY HISTORY:  No pertinent family history in first degree relatives          Social History:    MEDICATIONS  (STANDING):  albuterol/ipratropium for Nebulization 3 milliLiter(s) Nebulizer every 6 hours  aspirin enteric coated 325 milliGRAM(s) Oral daily  atorvastatin 80 milliGRAM(s) Oral at bedtime  gabapentin 100 milliGRAM(s) Oral three times a day  heparin   Injectable 5000 Unit(s) SubCutaneous every 8 hours  insulin glargine Injectable (LANTUS) 20 Unit(s) SubCutaneous at bedtime  insulin lispro (ADMELOG) corrective regimen sliding scale   SubCutaneous every 4 hours  insulin lispro Injectable (ADMELOG) 5 Unit(s) SubCutaneous before breakfast  insulin lispro Injectable (ADMELOG) 5 Unit(s) SubCutaneous before lunch  insulin lispro Injectable (ADMELOG) 5 Unit(s) SubCutaneous before dinner  isosorbide   mononitrate ER Tablet (IMDUR) 60 milliGRAM(s) Oral daily  metoprolol tartrate 50 milliGRAM(s) Oral two times a day  NIFEdipine XL 30 milliGRAM(s) Oral daily    MEDICATIONS  (PRN):  acetaminophen   Tablet .. 650 milliGRAM(s) Oral every 6 hours PRN Mild Pain (1 - 3)  sodium chloride 0.9% Bolus. 100 milliLiter(s) IV Bolus every 5 minutes PRN SBP LESS THAN or EQUAL to 90 mmHg        T(C): 36.7 (04-14-21 @ 08:12), Max: 36.8 (04-14-21 @ 04:46)  HR: 74 (04-14-21 @ 08:12) (69 - 74)  BP: 161/70 (04-14-21 @ 08:12) (109/66 - 161/70)  RR: 18 (04-14-21 @ 08:12) (18 - 20)  SpO2: 94% (04-14-21 @ 08:12) (94% - 100%)  Wt(kg): --    PHYSICAL EXAM:  NECK: Supple, No JVD, Normal thyroid  CHEST/LUNG: Clear to percussion bilaterally; No rales, rhonchi, wheezing, or rubs  HEART: Regular rate and rhythm; No murmurs, rubs, or gallops  ABDOMEN: Soft, Nontender, Nondistended; Bowel sounds present  EXTREMITIES:  2+ Peripheral Pulses, No clubbing, cyanosis, or edema  SKIN: No rashes or lesions    CAPILLARY BLOOD GLUCOSE      POCT Blood Glucose.: 100 mg/dL (14 Apr 2021 07:34)  POCT Blood Glucose.: 70 mg/dL (14 Apr 2021 05:48)  POCT Blood Glucose.: 104 mg/dL (14 Apr 2021 01:44)  POCT Blood Glucose.: 115 mg/dL (13 Apr 2021 22:03)  POCT Blood Glucose.: 202 mg/dL (13 Apr 2021 17:12)  POCT Blood Glucose.: 128 mg/dL (13 Apr 2021 12:09)                            10.2   13.08 )-----------( 276      ( 14 Apr 2021 09:23 )             32.7       CMP:  04-14 @ 08:13  SGPT --  Albumin --   Alk Phos --   Anion Gap 9   SGOT --   Total Bili --   BUN 45   Calcium Total 8.2   CO2 27   Chloride 99   Creatinine 6.30   eGFR if AA 7   eGFR if non AA 6   Glucose 104   Potassium 4.9   Protein --   Sodium 135      Thyroid Function Tests:  04-14 @ 08:13 TSH 1.15 FreeT4 -- T3 -- Anti TPO -- Anti Thyroglobulin Ab -- TSI --  04-13 @ 19:15 TSH 1.01 FreeT4 -- T3 -- Anti TPO -- Anti Thyroglobulin Ab -- TSI --      Diabetes Tests:       Radiology:

## 2021-04-14 NOTE — DIETITIAN INITIAL EVALUATION ADULT. - ADD RECOMMEND
1) Continue with Renal diet, recommend to add Consistent CHO diet, 2) Encourage adequate PO intake, food preferences obtained from pt's daughter, 3) Monitor pt's PO intake, weight, skin, edema, GI distress

## 2021-04-14 NOTE — PROGRESS NOTE ADULT - SUBJECTIVE AND OBJECTIVE BOX
[facility]       HPI:  71 yr female with PMHx CAD s/p stents '07, HTN, MI, PAF, liver abscess, s/p biliary stent with removal (11/2018; 7/2/19), chronic pancreatitis, DM2 on insulin, neuropathy, ESRD (5/2018) on HD (M/W/F) who presented this morning (4/12/21) via EMS from home with progressive SOB and found to be in hypoxic resp failure with SPO2 of 83% on Rm Air.      In E.D. pt initially placed on NRB with improvement of SPO2 99% with eventual placement of bipap therapy secondary to increased WOB and hypercapnic resp failure with ph 7.30 PCO2 of 50 serum HCO3 of 26, CXR with bilat opacities. Pt also found to have hyperkalemia of 6.8 without ECG changes, hyperglycemic of 266, WBC of 14.2, BNP 91213. Pt received lokelma 10gms po, Reg insuline 10 units IV x1.        Consult called for hypoxic/hypercapnic resp failure and hyperkalemia secondary to ESRD   (12 Apr 2021 15:40)        SUBJECTIVE:      ALLERGIES:  Allergies    ertapenem (Urticaria)  Purell (Rash)    Intolerances          MEDICATIONS  (STANDING):  albuterol/ipratropium for Nebulization 3 milliLiter(s) Nebulizer every 6 hours  aspirin enteric coated 325 milliGRAM(s) Oral daily  atorvastatin 80 milliGRAM(s) Oral at bedtime  heparin   Injectable 5000 Unit(s) SubCutaneous every 8 hours  insulin glargine Injectable (LANTUS) 15 Unit(s) SubCutaneous at bedtime  insulin lispro Injectable (ADMELOG) 5 Unit(s) SubCutaneous before breakfast  insulin lispro Injectable (ADMELOG) 5 Unit(s) SubCutaneous before lunch  insulin lispro Injectable (ADMELOG) 5 Unit(s) SubCutaneous before dinner  metoprolol tartrate 25 milliGRAM(s) Oral two times a day    MEDICATIONS  (PRN):  acetaminophen   Tablet .. 650 milliGRAM(s) Oral every 6 hours PRN Mild Pain (1 - 3)  sodium chloride 0.9% Bolus. 100 milliLiter(s) IV Bolus every 5 minutes PRN SBP LESS THAN or EQUAL to 90 mmHg      REVIEW OF SYSTEMS:  CONSTITUTIONAL: No fever,  RESPIRATORY: No cough, wheezing, shortness of breath  CARDIOVASCULAR: No chest pain, dyspnea, palpitations, dizziness, syncope, paroxysmal nocturnal dyspnea, orthopnea, or arm or leg swelling  GASTROINTESTINAL: No abdominal  or epigastric pain, nausea, vomiting,  diarrhea  NEUROLOGICAL: No headaches,  loss of strength, numbness, or tremors    Vital Signs Last 24 Hrs  T(C): 36.7 (14 Apr 2021 13:13), Max: 36.8 (14 Apr 2021 04:46)  T(F): 98 (14 Apr 2021 13:13), Max: 98.3 (14 Apr 2021 04:46)  HR: 83 (14 Apr 2021 21:02) (72 - 83)  BP: 137/81 (14 Apr 2021 13:13) (126/81 - 161/70)  BP(mean): --  RR: 18 (14 Apr 2021 13:13) (18 - 20)  SpO2: 97% (14 Apr 2021 21:02) (94% - 97%)    PHYSICAL EXAM:  HEAD:  Atraumatic, Normocephalic  NECK: Supple and normal thyroid.  No JVD or carotid bruit.   HEART: S1, S2 regular , 1/6 soft ejection systolic murmur in mitral area , no thrill and no gallops .  PULMONARY: Bilateral vesicular breathing , few scattered ronchi and few scattered rales are present .  ABDOMEN: Soft nontender and positive bowl sounds   EXTREMITIES:  No clubbing, cyanosis, or pedal  edema  NEUROLOGICAL: AAOX3 , no focal deficit .    LABS:                        10.2   13.08 )-----------( 276      ( 14 Apr 2021 09:23 )             32.7     04-14    135  |  99  |  45<H>  ----------------------------<  104<H>  4.9   |  27  |  6.30<H>    Ca    8.2<L>      14 Apr 2021 08:13  Mg     2.3     04-14      CARDIAC MARKERS ( 14 Apr 2021 08:13 )  .038 ng/mL / x     / x     / x     / x      CARDIAC MARKERS ( 13 Apr 2021 07:09 )  .047 ng/mL / x     / 58 U/L / x     / x              BNP      EKG:  ECHO:  IMAGING:    Assessment/Plan    Will continue to follow during hospital course and give further recommendations as needed . Thanks for your referral . if any questions please contact me at office ([phone])cell [phone])

## 2021-04-14 NOTE — CONSULT NOTE ADULT - NSICDXPROBLEMREC1_GEN_ALL_CORE_FT
decrease lantus 15 units qhs  cont admelog 5 units 3x/day  cont low dose admelog scale coverage qac/qhs  cont cons cho diet

## 2021-04-14 NOTE — PROGRESS NOTE ADULT - SUBJECTIVE AND OBJECTIVE BOX
Patient is a 71y old  Female who presents with a chief complaint of SOB (14 Apr 2021 11:40)      INTERVAL /OVERNIGHT EVENTS: seen in HD, irritable    MEDICATIONS  (STANDING):  albuterol/ipratropium for Nebulization 3 milliLiter(s) Nebulizer every 6 hours  aspirin enteric coated 325 milliGRAM(s) Oral daily  atorvastatin 80 milliGRAM(s) Oral at bedtime  heparin   Injectable 5000 Unit(s) SubCutaneous every 8 hours  insulin glargine Injectable (LANTUS) 15 Unit(s) SubCutaneous at bedtime  insulin lispro Injectable (ADMELOG) 5 Unit(s) SubCutaneous before breakfast  insulin lispro Injectable (ADMELOG) 5 Unit(s) SubCutaneous before lunch  insulin lispro Injectable (ADMELOG) 5 Unit(s) SubCutaneous before dinner  metoprolol tartrate 25 milliGRAM(s) Oral two times a day    MEDICATIONS  (PRN):  acetaminophen   Tablet .. 650 milliGRAM(s) Oral every 6 hours PRN Mild Pain (1 - 3)  sodium chloride 0.9% Bolus. 100 milliLiter(s) IV Bolus every 5 minutes PRN SBP LESS THAN or EQUAL to 90 mmHg      Allergies    ertapenem (Urticaria)  Purell (Rash)    Intolerances        REVIEW OF SYSTEMS:  unable to obtain    Vital Signs Last 24 Hrs  T(C): 36.7 (14 Apr 2021 13:13), Max: 36.8 (14 Apr 2021 04:46)  T(F): 98 (14 Apr 2021 13:13), Max: 98.3 (14 Apr 2021 04:46)  HR: 72 (14 Apr 2021 13:13) (72 - 74)  BP: 137/81 (14 Apr 2021 13:13) (109/66 - 161/70)  BP(mean): --  RR: 18 (14 Apr 2021 13:13) (18 - 20)  SpO2: 94% (14 Apr 2021 13:13) (94% - 98%)    PHYSICAL EXAM:  GENERAL: NAD, well-groomed, well-developed  HEAD:  Atraumatic, Normocephalic  EYES: EOMI, PERRLA, conjunctiva and sclera clear  ENMT: No tonsillar erythema, exudates, or enlargement; Moist mucous membranes, Good dentition, No lesions  NECK: Supple, No JVD, Normal thyroid  NERVOUS SYSTEM:  Alert & awake; Motor Strength 5/5 B/L upper and lower extremities; DTRs 2+ intact and symmetric  CHEST/LUNG: Clear to auscultation bilaterally; No rales, rhonchi, wheezing, or rubs  HEART: Regular rate and rhythm; No murmurs, rubs, or gallops  ABDOMEN: Soft, Nontender, Nondistended; Bowel sounds present  EXTREMITIES:  2+ Peripheral Pulses, No clubbing, cyanosis, or edema  LYMPH: No lymphadenopathy noted  SKIN: No rashes or lesions    LABS:                        10.2   13.08 )-----------( 276      ( 14 Apr 2021 09:23 )             32.7     14 Apr 2021 08:13    135    |  99     |  45     ----------------------------<  104    4.9     |  27     |  6.30     Ca    8.2        14 Apr 2021 08:13  Mg     2.3       14 Apr 2021 08:13      PT/INR - ( 12 Apr 2021 18:08 )   PT: 13.6 sec;   INR: 1.17 ratio         PTT - ( 12 Apr 2021 18:08 )  PTT:31.1 sec    CAPILLARY BLOOD GLUCOSE      POCT Blood Glucose.: 158 mg/dL (14 Apr 2021 16:40)  POCT Blood Glucose.: 107 mg/dL (14 Apr 2021 12:44)  POCT Blood Glucose.: 68 mg/dL (14 Apr 2021 12:08)  POCT Blood Glucose.: 67 mg/dL (14 Apr 2021 12:06)  POCT Blood Glucose.: 100 mg/dL (14 Apr 2021 07:34)  POCT Blood Glucose.: 70 mg/dL (14 Apr 2021 05:48)  POCT Blood Glucose.: 104 mg/dL (14 Apr 2021 01:44)  POCT Blood Glucose.: 115 mg/dL (13 Apr 2021 22:03)      RADIOLOGY & ADDITIONAL TESTS:    Notes Reviewed:  [x ] YES  [ ] NO    Care Discussed with Consultants/Other Providers [x ] YES  [ ] NO

## 2021-04-14 NOTE — PROGRESS NOTE ADULT - ASSESSMENT
ESRD on HD  Pulmonary edema  Hypertension  Diabetes  Hyperkalemia  Confusion, + CVA    Pt confused. CT results noted. Pt refused HD today. Next HD tomorrow. PO fluid restriction. Pt with very poor out pt compliance with meds and diet. On lokelma.   Monitor blood sugar levels. Insulin coverage as needed. Monitor BP trend. Titrate BP meds as needed. Salt restriction. Neurology follow up.

## 2021-04-14 NOTE — PROGRESS NOTE ADULT - SUBJECTIVE AND OBJECTIVE BOX
Patient is a 71y old  Female who presents with a chief complaint of SOB (14 Apr 2021 11:40)      INTERVAL HPI/OVERNIGHT EVENTS:    confused - no distress    MEDICATIONS  (STANDING):  albuterol/ipratropium for Nebulization 3 milliLiter(s) Nebulizer every 6 hours  aspirin enteric coated 325 milliGRAM(s) Oral daily  atorvastatin 80 milliGRAM(s) Oral at bedtime  heparin   Injectable 5000 Unit(s) SubCutaneous every 8 hours  insulin glargine Injectable (LANTUS) 15 Unit(s) SubCutaneous at bedtime  insulin lispro Injectable (ADMELOG) 5 Unit(s) SubCutaneous before breakfast  insulin lispro Injectable (ADMELOG) 5 Unit(s) SubCutaneous before lunch  insulin lispro Injectable (ADMELOG) 5 Unit(s) SubCutaneous before dinner  metoprolol tartrate 25 milliGRAM(s) Oral two times a day      MEDICATIONS  (PRN):  acetaminophen   Tablet .. 650 milliGRAM(s) Oral every 6 hours PRN Mild Pain (1 - 3)  sodium chloride 0.9% Bolus. 100 milliLiter(s) IV Bolus every 5 minutes PRN SBP LESS THAN or EQUAL to 90 mmHg      Allergies    ertapenem (Urticaria)  Purell (Rash)    Intolerances        PAST MEDICAL & SURGICAL HISTORY:  Hypertension    Myocardial infarction  2007    Diabetes  Type 2 DM    CAD (coronary artery disease)  s/p stent x 1 in 2007    ESRD (end stage renal disease) on dialysis  MWF since 05/2018    Liver abscess  resolved    Acute urinary retention    Cholecystitis with cholangitis    Chronic pancreatitis    PAF (paroxysmal atrial fibrillation)  1 episode in 11/2018 while hospitalized for acute cholecystitis    H/O diabetic neuropathy    H/O: hysterectomy  1990    History of biliary stent insertion  11/2018 &amp; removal 7/2/19    S/P arteriovenous (AV) fistula creation  05/17/2019    Cataract  IOL b/l        Vital Signs Last 24 Hrs  T(C): 36.7 (14 Apr 2021 13:13), Max: 36.8 (14 Apr 2021 04:46)  T(F): 98 (14 Apr 2021 13:13), Max: 98.3 (14 Apr 2021 04:46)  HR: 72 (14 Apr 2021 13:13) (72 - 74)  BP: 137/81 (14 Apr 2021 13:13) (126/81 - 161/70)  BP(mean): --  RR: 18 (14 Apr 2021 13:13) (18 - 20)  SpO2: 94% (14 Apr 2021 13:13) (94% - 96%)    PHYSICAL EXAMINATION:    GENERAL: The patient is awake and alert in no apparent distress.     HEENT: Head is normocephalic and atraumatic. Extraocular muscles are intact. Mucous membranes are moist.    NECK: Supple.    LUNGS: Clear to auscultation without wheezing, rales or rhonchi; respirations unlabored    HEART: Regular rate and rhythm without murmur.    ABDOMEN: Soft, nontender, and nondistended.      EXTREMITIES: Without any cyanosis, clubbing, rash, lesions or edema.    NEUROLOGIC: Grossly intact.    SKIN: No ulceration or induration present.      LABS:                        10.2   13.08 )-----------( 276      ( 14 Apr 2021 09:23 )             32.7     04-14    135  |  99  |  45<H>  ----------------------------<  104<H>  4.9   |  27  |  6.30<H>    Ca    8.2<L>      14 Apr 2021 08:13  Mg     2.3     04-14            CARDIAC MARKERS ( 14 Apr 2021 08:13 )  .038 ng/mL / x     / x     / x     / x      CARDIAC MARKERS ( 13 Apr 2021 07:09 )  .047 ng/mL / x     / 58 U/L / x     / x            Serum Pro-Brain Natriuretic Peptide: 84703 pg/mL (04-12-21 @ 09:59)      Procalcitonin, Serum: 0.61 ng/mL (04-13-21 @ 19:15)      MICROBIOLOGY:  Culture Results:   No growth to date. (04-12 @ 15:03)  Culture Results:   No growth to date. (04-12 @ 15:03)          Assessment:    S/P CVA with altered mentation  Acute hypoxic respiratory failure  Diabetes type 2  coronary artery disease  PAF  ESRD      Plan:    Duoneb QID  SQ heparin for DVT prophylaxis  Supplemental oxygen  Dialysis as per nephrology  Follow blood sugars

## 2021-04-14 NOTE — DIETITIAN INITIAL EVALUATION ADULT. - ORAL INTAKE PTA/DIET HISTORY
Pt's daughter reports pt's appetite and PO intake is normally good however recently decreased over the past 2 weeks after getting COVID vaccine. Pt tries to limit her intake of CHO, potassium, phosphorus, also sees RD at dialysis center. Pt with hx of DM, checks blood glucose, usual reading 140-150mg/dL, on long acting and short acting insulin, current HgbA1c 8.7%, indicates poor control. NKFA.

## 2021-04-14 NOTE — PROGRESS NOTE ADULT - SUBJECTIVE AND OBJECTIVE BOX
Patient is a 71y old  Female who presents with a chief complaint of   Patient seen in follow up for ESRD on HD, SOB.        PAST MEDICAL HISTORY:  Diabetes    Hypertension    Myocardial infarction    Pneumonia    Hypertension    CRF (chronic renal failure)    Diabetes    CAD (coronary artery disease)    ESRD (end stage renal disease) on dialysis    Liver abscess    Acute urinary retention    Cholecystitis with cholangitis    Chronic pancreatitis    PAF (paroxysmal atrial fibrillation)    H/O diabetic neuropathy      MEDICATIONS  (STANDING):  albuterol/ipratropium for Nebulization 3 milliLiter(s) Nebulizer every 6 hours  aspirin enteric coated 325 milliGRAM(s) Oral daily  atorvastatin 80 milliGRAM(s) Oral at bedtime  gabapentin 100 milliGRAM(s) Oral three times a day  heparin   Injectable 5000 Unit(s) SubCutaneous every 8 hours  insulin glargine Injectable (LANTUS) 15 Unit(s) SubCutaneous at bedtime  insulin lispro Injectable (ADMELOG) 5 Unit(s) SubCutaneous before breakfast  insulin lispro Injectable (ADMELOG) 5 Unit(s) SubCutaneous before lunch  insulin lispro Injectable (ADMELOG) 5 Unit(s) SubCutaneous before dinner  isosorbide   mononitrate ER Tablet (IMDUR) 60 milliGRAM(s) Oral daily  metoprolol tartrate 50 milliGRAM(s) Oral two times a day  NIFEdipine XL 30 milliGRAM(s) Oral daily    MEDICATIONS  (PRN):  acetaminophen   Tablet .. 650 milliGRAM(s) Oral every 6 hours PRN Mild Pain (1 - 3)  sodium chloride 0.9% Bolus. 100 milliLiter(s) IV Bolus every 5 minutes PRN SBP LESS THAN or EQUAL to 90 mmHg    T(C): 36.7 (04-14-21 @ 13:13), Max: 36.9 (04-12-21 @ 19:38)  HR: 72 (04-14-21 @ 13:13) (69 - 90)  BP: 137/81 (04-14-21 @ 13:13) (109/66 - 178/74)  RR: 18 (04-14-21 @ 13:13)  SpO2: 94% (04-14-21 @ 13:13)  Wt(kg): --  I&O's Detail    13 Apr 2021 07:01  -  14 Apr 2021 07:00  --------------------------------------------------------  IN:  Total IN: 0 mL    OUT:    Voided (mL): 200 mL  Total OUT: 200 mL    Total NET: -200 mL      14 Apr 2021 07:01  -  14 Apr 2021 14:43  --------------------------------------------------------  IN:    Oral Fluid: 480 mL  Total IN: 480 mL    OUT:  Total OUT: 0 mL    Total NET: 480 mL                PHYSICAL EXAM:  General: No distress  Respiratory: b/l air entry  Cardiovascular: S1 S2  Gastrointestinal: soft  Extremities:  edema, left AVF                              LABORATORY:                        10.2   13.08 )-----------( 276      ( 14 Apr 2021 09:23 )             32.7     04-14    135  |  99  |  45<H>  ----------------------------<  104<H>  4.9   |  27  |  6.30<H>    Ca    8.2<L>      14 Apr 2021 08:13  Phos  2.8     04-12  Mg     2.3     04-14    TPro  9.4<H>  /  Alb  3.4  /  TBili  0.5  /  DBili  x   /  AST  14<L>  /  ALT  20  /  AlkPhos  99  04-12    Sodium, Serum: 135 mmol/L (04-14 @ 08:13)  Sodium, Serum: 135 mmol/L (04-13 @ 07:09)  Sodium, Serum: 135 mmol/L (04-12 @ 18:29)    Potassium, Serum: 4.9 mmol/L (04-14 @ 08:13)  Potassium, Serum: 4.5 mmol/L (04-13 @ 07:09)  Potassium, Serum: 3.5 mmol/L (04-12 @ 18:29)    Hemoglobin: 10.2 g/dL (04-14 @ 09:23)  Hemoglobin: 10.3 g/dL (04-13 @ 07:09)  Hemoglobin: 11.6 g/dL (04-12 @ 18:08)  Hemoglobin: 10.9 g/dL (04-12 @ 09:59)    Creatinine, Serum 6.30 (04-14 @ 08:13)  Creatinine, Serum 4.80 (04-13 @ 07:09)  Creatinine, Serum 2.40 (04-12 @ 18:29)  Creatinine, Serum 6.70 (04-12 @ 09:59)    CARDIAC MARKERS ( 14 Apr 2021 08:13 )  .038 ng/mL / x     / x     / x     / x      CARDIAC MARKERS ( 13 Apr 2021 07:09 )  .047 ng/mL / x     / 58 U/L / x     / x      CARDIAC MARKERS ( 12 Apr 2021 18:29 )  .044 ng/mL / x     / 50 U/L / x     / 2.2 ng/mL      LIVER FUNCTIONS - ( 12 Apr 2021 18:29 )  Alb: 3.4 g/dL / Pro: 9.4 g/dL / ALK PHOS: 99 U/L / ALT: 20 U/L / AST: 14 U/L / GGT: x             < from: CT Head No Cont (04.13.21 @ 21:20) >    EXAM:  CT BRAIN                            PROCEDURE DATE:  04/13/2021          INTERPRETATION:  CLINICAL INDICATION: AMS, assess CVA.    TECHNIQUE: CT axial images of the head were obtained without intravenous contrast. Computer-reconstructed coronal and sagittal images were obtained.    COMPARISON: None.    FINDINGS: Artifact degrades images of the posterior fossa/lower brain as the patient unable to remove earrings. Nonspecific mild to moderate periventricular and subcortical white matter lucencies likely represent chronic microvascular ischemic changes. Lucency in the left occipital and medial temporal lobes likely represent acute to subacute infarct. There is no obvious acute intracranial hemorrhage, mass effect or midline shift, giventhe extent of artifact. Small lucencies in the left thalamocapsular junction and bilateral cerebellum may represent age-indeterminate infarcts. There is mild to moderate cerebral volume loss with ventricular dilatation.    There is no depressed skullfracture. There is sinus mucosal thickening, especially in the right maxillary sinus. The tympanomastoid region is clear.    IMPRESSION:    Likely acute to subacute, left MAGEN territory infarct. No obvious acute intracranial hemorrhage. Follow-up MRI would be helpful for further evaluation.    Discussed with Dr. Mcclure.            VIKRAM BEE MD; Attending Radiologist  This document has been electronically signed. Apr 13 2021 11:10PM    < end of copied text >

## 2021-04-14 NOTE — PROGRESS NOTE ADULT - SUBJECTIVE AND OBJECTIVE BOX
Neurology follow up note    IRENE [id number]      Interval History:    Patient  resting in bed     MEDICATIONS    acetaminophen   Tablet .. 650 milliGRAM(s) Oral every 6 hours PRN  albuterol/ipratropium for Nebulization 3 milliLiter(s) Nebulizer every 6 hours  aspirin enteric coated 325 milliGRAM(s) Oral daily  atorvastatin 80 milliGRAM(s) Oral at bedtime  gabapentin 100 milliGRAM(s) Oral three times a day  heparin   Injectable 5000 Unit(s) SubCutaneous every 8 hours  insulin glargine Injectable (LANTUS) 15 Unit(s) SubCutaneous at bedtime  insulin lispro (ADMELOG) corrective regimen sliding scale   SubCutaneous every 4 hours  insulin lispro Injectable (ADMELOG) 5 Unit(s) SubCutaneous before breakfast  insulin lispro Injectable (ADMELOG) 5 Unit(s) SubCutaneous before lunch  insulin lispro Injectable (ADMELOG) 5 Unit(s) SubCutaneous before dinner  isosorbide   mononitrate ER Tablet (IMDUR) 60 milliGRAM(s) Oral daily  metoprolol tartrate 50 milliGRAM(s) Oral two times a day  NIFEdipine XL 30 milliGRAM(s) Oral daily  sodium chloride 0.9% Bolus. 100 milliLiter(s) IV Bolus every 5 minutes PRN      Allergies    ertapenem (Urticaria)  Purell (Rash)    Intolerances            Vital Signs Last 24 Hrs  T(C): 36.7 (14 Apr 2021 08:12), Max: 36.8 (14 Apr 2021 04:46)  T(F): 98 (14 Apr 2021 08:12), Max: 98.3 (14 Apr 2021 04:46)  HR: 74 (14 Apr 2021 08:12) (72 - 74)  BP: 161/70 (14 Apr 2021 08:12) (109/66 - 161/70)  BP(mean): --  RR: 18 (14 Apr 2021 08:12) (18 - 20)  SpO2: 94% (14 Apr 2021 08:12) (94% - 99%)      REVIEW OF SYSTEMS:  Very limited secondary to the patient being disoriented, did not answer questions accordingly becomes anger.    PHYSICAL EXAMINATION:  .  HEENT:  Head:  Normocephalic, atraumatic.  Eyes:  No scleral icterus.  Ears:  Hearing appeared to be intact.  NECK:  Supple.  CARDIOVASCULAR:  S1 and S2 heard.  RESPIRATORY:  Good air entry bilaterally.  ABDOMEN:  Soft, nontender.  EXTREMITIES:  No clubbing or cyanosis were noted.      NEUROLOGIC:  The patient is awake and alert.  Location was home  Able to say daughter's name.  Year and month was unknown.  would not name objects.  Extraocular movements were intact.  Speech was fluent.  Motor:  Bilateral upper 4/5.  Bilateral lower extremities with plantar stimulation, slight flexation at the hip and knee, would say overall 3-/5.  The patient would not follow complex or simple commands.  patient would become anger easily yelling not to be touched             LABS:  CBC Full  -  ( 14 Apr 2021 09:23 )  WBC Count : 13.08 K/uL  RBC Count : 3.50 M/uL  Hemoglobin : 10.2 g/dL  Hematocrit : 32.7 %  Platelet Count - Automated : 276 K/uL  Mean Cell Volume : 93.4 fl  Mean Cell Hemoglobin : 29.1 pg  Mean Cell Hemoglobin Concentration : 31.2 gm/dL  Auto Neutrophil # : x  Auto Lymphocyte # : x  Auto Monocyte # : x  Auto Eosinophil # : x  Auto Basophil # : x  Auto Neutrophil % : x  Auto Lymphocyte % : x  Auto Monocyte % : x  Auto Eosinophil % : x  Auto Basophil % : x      04-14    135  |  99  |  45<H>  ----------------------------<  104<H>  4.9   |  27  |  6.30<H>    Ca    8.2<L>      14 Apr 2021 08:13  Phos  2.8     04-12  Mg     2.3     04-14    TPro  9.4<H>  /  Alb  3.4  /  TBili  0.5  /  DBili  x   /  AST  14<L>  /  ALT  20  /  AlkPhos  99  04-12    Hemoglobin A1C:   Lipid Panel 04-14 @ 11:14  Total Cholesterol, Serum 101  LDL --  Triglycerides 83    LIVER FUNCTIONS - ( 12 Apr 2021 18:29 )  Alb: 3.4 g/dL / Pro: 9.4 g/dL / ALK PHOS: 99 U/L / ALT: 20 U/L / AST: 14 U/L / GGT: x           Vitamin B12 Vitamin B12, Serum: 607 pg/mL (04-14 @ 05:00)    PT/INR - ( 12 Apr 2021 18:08 )   PT: 13.6 sec;   INR: 1.17 ratio         PTT - ( 12 Apr 2021 18:08 )  PTT:31.1 sec      RADIOLOGY  ANALYSIS AND PLAN:  This is a 71-year-old with episode of altered mental status.    For episode of altered mental status, suspect most likely this is metabolic in nature, questionable secondary to any type of hospital induced delirium psychosis vs infection  We will plan for MRI imaging of the brain.  Would recommend to rule out respiratory causes, would recommend to check ABG.  The patient does appear to be having elevated white blood cell count.  Would recommend to rule out any type of underlying infectious type process.  For history of diabetes, strict control of blood sugars.  For history of neuropathy, the patient does have elevated renal function.  I would recommend Nephrology followup in regards to possibly adjustment dose of the patient's gabapentin decreased to 100 tid  For history of hypertension, monitor systolic blood pressure.  ct chest noted antibiotics as needed   For paroxysmal atrial fibrillation, telemetry evaluation.  Cardiology followup as needed.    Spoke with daughter, Frederick; her telephone number is [phone] 4/14/2021 use her to translate and ask mom questions       Greater than 45 minutes of time was spent with the patient, plan of care, reviewing data, with greater than 50% of the visit was spent counseling and/or coordinating care with multidisciplinary healthcare team

## 2021-04-14 NOTE — DIETITIAN INITIAL EVALUATION ADULT. - OTHER INFO
As per chart pt is a 71 year old female with a PMH of AD s/p stents ('07), HTN, MI, liver abscess, chronic pancreatitis, DM on insulin, ESRD on HD (M/W/F) who presented to E.D. today (4/12/21) with c/o sob.     Pt seen at bedside, sleeping at time of visit. Information obtained from pt's daughter over the phone. Pt currently with fair appetite and PO intake, daughter states that she ate some food from home but declined eating anything for dinner. Pt's admission weight 207.4lbs, current weights per chart (4/14) 205.2lbs, (4/13) 204.5lbs, (4/12) 207.4lbs. Daughter reports current weight of 198lbs, stable, denies any significant recent weight changes. Per previous RD note pt's weight (8/2020) 210lb, indicates pt's weight has remained fairly stable with slight weight furcations likely due to fluid shifts as pt is on HD. Received HD today. Denies any swallowing difficulties but states pt need softer foods. No GI distress noted at this time, last BM 4/12.    No pressure injuries noted at this time As per chart pt is a 71 year old female with a PMH of AD s/p stents ('07), HTN, MI, liver abscess, chronic pancreatitis, DM on insulin, ESRD on HD (M/W/F) who presented to E.D. today (4/12/21) with c/o sob.     Pt seen at bedside, sleeping at time of visit. Information obtained from pt's daughter over the phone. Pt currently with fair appetite and PO intake, daughter states that she ate some food from home but declined eating anything for dinner. Pt's admission weight 207.4lbs, current weights per chart (4/14) 205.2lbs, (4/13) 204.5lbs, (4/12) 207.4lbs. Daughter reports current weight of 198lbs, stable, denies any significant recent weight changes. Per previous RD note pt's weight (8/2020) 210lb, indicates pt's weight has remained fairly stable with slight weight furcations likely due to fluid shifts as pt is on HD. Denies any swallowing difficulties but states pt need softer foods. No GI distress noted at this time, last BM 4/12.    No pressure injuries noted at this time

## 2021-04-14 NOTE — DIETITIAN INITIAL EVALUATION ADULT. - NSPROEDAREADYLEARN_GEN_A_NUR
Education not appropriate, pt follow RD at Dialysis center, encouraged to continue/acuteness of illness

## 2021-04-15 DIAGNOSIS — R41.89 OTHER SYMPTOMS AND SIGNS INVOLVING COGNITIVE FUNCTIONS AND AWARENESS: ICD-10-CM

## 2021-04-15 DIAGNOSIS — I63.9 CEREBRAL INFARCTION, UNSPECIFIED: ICD-10-CM

## 2021-04-15 DIAGNOSIS — R47.01 APHASIA: ICD-10-CM

## 2021-04-15 DIAGNOSIS — Z74.09 OTHER REDUCED MOBILITY: ICD-10-CM

## 2021-04-15 LAB
ANION GAP SERPL CALC-SCNC: 13 MMOL/L — SIGNIFICANT CHANGE UP (ref 5–17)
BUN SERPL-MCNC: 59 MG/DL — HIGH (ref 7–23)
CALCIUM SERPL-MCNC: 8.2 MG/DL — LOW (ref 8.5–10.1)
CHLORIDE SERPL-SCNC: 98 MMOL/L — SIGNIFICANT CHANGE UP (ref 96–108)
CO2 SERPL-SCNC: 24 MMOL/L — SIGNIFICANT CHANGE UP (ref 22–31)
CREAT SERPL-MCNC: 7.8 MG/DL — HIGH (ref 0.5–1.3)
GLUCOSE SERPL-MCNC: 170 MG/DL — HIGH (ref 70–99)
HCT VFR BLD CALC: 32.2 % — LOW (ref 34.5–45)
HGB BLD-MCNC: 10.2 G/DL — LOW (ref 11.5–15.5)
MCHC RBC-ENTMCNC: 29.1 PG — SIGNIFICANT CHANGE UP (ref 27–34)
MCHC RBC-ENTMCNC: 31.7 GM/DL — LOW (ref 32–36)
MCV RBC AUTO: 91.7 FL — SIGNIFICANT CHANGE UP (ref 80–100)
NRBC # BLD: 0 /100 WBCS — SIGNIFICANT CHANGE UP (ref 0–0)
PLATELET # BLD AUTO: 275 K/UL — SIGNIFICANT CHANGE UP (ref 150–400)
POTASSIUM SERPL-MCNC: 5 MMOL/L — SIGNIFICANT CHANGE UP (ref 3.5–5.3)
POTASSIUM SERPL-SCNC: 5 MMOL/L — SIGNIFICANT CHANGE UP (ref 3.5–5.3)
RBC # BLD: 3.51 M/UL — LOW (ref 3.8–5.2)
RBC # FLD: 14.1 % — SIGNIFICANT CHANGE UP (ref 10.3–14.5)
SODIUM SERPL-SCNC: 135 MMOL/L — SIGNIFICANT CHANGE UP (ref 135–145)
WBC # BLD: 16.61 K/UL — HIGH (ref 3.8–10.5)
WBC # FLD AUTO: 16.61 K/UL — HIGH (ref 3.8–10.5)

## 2021-04-15 RX ORDER — CLOPIDOGREL BISULFATE 75 MG/1
75 TABLET, FILM COATED ORAL DAILY
Refills: 0 | Status: DISCONTINUED | OUTPATIENT
Start: 2021-04-15 | End: 2021-04-23

## 2021-04-15 RX ORDER — INSULIN GLARGINE 100 [IU]/ML
12 INJECTION, SOLUTION SUBCUTANEOUS AT BEDTIME
Refills: 0 | Status: DISCONTINUED | OUTPATIENT
Start: 2021-04-15 | End: 2021-04-23

## 2021-04-15 RX ADMIN — INSULIN GLARGINE 12 UNIT(S): 100 INJECTION, SOLUTION SUBCUTANEOUS at 22:16

## 2021-04-15 RX ADMIN — HEPARIN SODIUM 5000 UNIT(S): 5000 INJECTION INTRAVENOUS; SUBCUTANEOUS at 17:46

## 2021-04-15 RX ADMIN — Medication 3 MILLILITER(S): at 21:08

## 2021-04-15 RX ADMIN — Medication 3 MILLILITER(S): at 08:34

## 2021-04-15 RX ADMIN — HEPARIN SODIUM 5000 UNIT(S): 5000 INJECTION INTRAVENOUS; SUBCUTANEOUS at 06:08

## 2021-04-15 RX ADMIN — HEPARIN SODIUM 5000 UNIT(S): 5000 INJECTION INTRAVENOUS; SUBCUTANEOUS at 21:18

## 2021-04-15 RX ADMIN — Medication 5 UNIT(S): at 17:21

## 2021-04-15 RX ADMIN — ATORVASTATIN CALCIUM 80 MILLIGRAM(S): 80 TABLET, FILM COATED ORAL at 21:18

## 2021-04-15 NOTE — CONSULT NOTE ADULT - SUBJECTIVE AND OBJECTIVE BOX
Physical Medicine and Rehabilitation Initial Evaluation    Patients acute care records reviewed and are summarized as follows:     Patient is a 71F with past medical history including CAD s/p stent placement in 2007, HTN, PAF, liver abscess s/p biliary stenting with removal, chronic pancreatitis, DM2 on insulin, neuropathy, ESRD on HD who is admitted with acute hypoxic respiratory failure with SpO2 at 83% on admission. Patient place on NRB with resulting improvement, CXR showed bilateral opacities. Patient also with metabolic derangements including K+ of 6.8 without any changes noted on EKG, hyperglycemia to 266, leukocytosis to 14.2, BNP 24K. Patient was noted to be acutely altered with respect to mental status. As seen by neurology, patient received MRI of the brain.    Radiological studies reviewed, including MRA of the brain which revealed proximal L PCA occlusion. MRI brain also performed which revealed large left sided PCA territory infarction with chronic lacunar infarcts in bilateral cerebellum, and additional acute infarction of the right sided deysi.    Medical studies/laboratory studies reviewed, including:                          10.2   16.61 )-----------( 275      ( 15 Apr 2021 11:49 )             32.2   04-15    135  |  98  |  59<H>  ----------------------------<  170<H>  5.0   |  24  |  7.80<H>    Ca    8.2<L>      15 Apr 2021 11:49  Phos  7.5     04-15  Mg     2.3     04-14    TPro  x   /  Alb  2.6<L>  /  TBili  x   /  DBili  x   /  AST  x   /  ALT  x   /  AlkPhos  x   04-15    Full functional assessment by PT/OT/SLP yet to be completed at this time secondary to dialysis, imaging, patient sleeping/not in room during attempted evaluations.    Collateral history obtained from patients daughter. At baseline, patient was cognitively intact, able to communicate needs and wants, able to ambulate short distances within a room walker and achieved longer distance mobility with wheelchair. Patients neuropathy in lower extremities limited patients ambulation, and created significant fall risk however patient was still largely independent with some basic ADL's such as feeding, toileting, showering. Patient lives with her daughter as well, and has family support through the day.    ROS: Not able to fully obtain secondary to cognitive deficits/encephalopathy    PM, Social, Family Hx: as above in HPI, Family history reviewed and found to be non pertinent to patients current disposition, denies history of alcohol, illicit drug use, tobacco use.    Physical Exam:   Vitals: Vital Signs Last 24 Hrs  T(C): 37.1 (15 Apr 2021 19:15), Max: 37.1 (15 Apr 2021 19:15)  T(F): 98.7 (15 Apr 2021 19:15), Max: 98.7 (15 Apr 2021 19:15)  HR: 83 (15 Apr 2021 21:09) (66 - 96)  BP: 100/56 (15 Apr 2021 19:15) (100/56 - 139/81)  BP(mean): --  RR: 17 (15 Apr 2021 19:15) (16 - 19)  SpO2: 93% (15 Apr 2021 21:09) (93% - 100%)    Constitutional: Gen: In no acute distress   Eyes: PERRL, no erythema of conjunctivae  Ears/Nose/Mouth/Throat: Mucous membranes moist, no thrush, no rhinorrhea  CV: Regular rate and rhythm, S1 S2, pedal pulses intact  Resp: Good respiratory effort, Good air movement all lung fields  GI: Nontender, normoactive bowel sounds  Neuro: Cranial Nerves not able to fully assess due to impaired command following, sensation intact to light touch in peripheral upper and lower extremities, speech fluent, comprehension impaired, naming impaired, confused and not fully oriented  MSK: No cyanosis or clubbing of nails, strength 4- to 4/5 in bilateral upper and 2 to 3/5 in lower extremities, ROM full in all extremities passively  Neck: No midline tenderness to palpation, supple  Skin: No rashes on limbs, normal temperature on palpation

## 2021-04-15 NOTE — PROGRESS NOTE ADULT - SUBJECTIVE AND OBJECTIVE BOX
CAPILLARY BLOOD GLUCOSE      POCT Blood Glucose.: 88 mg/dL (15 Apr 2021 07:37)  POCT Blood Glucose.: 228 mg/dL (14 Apr 2021 21:02)  POCT Blood Glucose.: 158 mg/dL (14 Apr 2021 16:40)  POCT Blood Glucose.: 107 mg/dL (14 Apr 2021 12:44)  POCT Blood Glucose.: 68 mg/dL (14 Apr 2021 12:08)  POCT Blood Glucose.: 67 mg/dL (14 Apr 2021 12:06)      Vital Signs Last 24 Hrs  T(C): 36.8 (15 Apr 2021 05:07), Max: 37.4 (14 Apr 2021 22:00)  T(F): 98.3 (15 Apr 2021 05:07), Max: 99.4 (14 Apr 2021 22:00)  HR: 89 (15 Apr 2021 08:39) (66 - 89)  BP: 113/61 (15 Apr 2021 05:07) (104/78 - 138/79)  BP(mean): --  RR: 18 (15 Apr 2021 05:07) (18 - 18)  SpO2: 97% (15 Apr 2021 08:39) (94% - 97%)    General: WN/WD NAD  Respiratory: CTA B/L  CV: RRR, S1S2, no murmurs, rubs or gallops  Abdominal: Soft, NT, ND +BS, Last BM  Extremities: No edema, + peripheral pulses     04-14    135  |  99  |  45<H>  ----------------------------<  104<H>  4.9   |  27  |  6.30<H>    Ca    8.2<L>      14 Apr 2021 08:13  Mg     2.3     04-14        atorvastatin 80 milliGRAM(s) Oral at bedtime  insulin glargine Injectable (LANTUS) 15 Unit(s) SubCutaneous at bedtime  insulin lispro Injectable (ADMELOG) 5 Unit(s) SubCutaneous before breakfast  insulin lispro Injectable (ADMELOG) 5 Unit(s) SubCutaneous before lunch  insulin lispro Injectable (ADMELOG) 5 Unit(s) SubCutaneous before dinner

## 2021-04-15 NOTE — CONSULT NOTE ADULT - ASSESSMENT
71F with large PCA territory infarction now with cognitive deficits, weakness, impaired mobility and ADL's    Pending patients formal assessment by therapy staff, patient is a candidate for subacute rehabilitation given her relatively high/independent level of prior function relative to her current clear deficits as exhibited on examination with weakness on manual motor testing, cognitive deficits, impaired command following, aphasia with speech components intact. She would benefit from PT/OT and SLP program in a subacute setting.    For patients aphasia, she may be a candidate for aricept for aphasia recovery, however will reserve from formal recommendation until patient is evaluated by PT/OT/SLP and while other metabolic issues are being addressed/worked up., leukocytosis etc.    Consider melatonin 5mg QHS to help establish sleep/wake cycle to reduce episodic delirium. Encourage staff to open blinds and allow natural light exposure during the day.    She is on appropriate secondary stroke prophylaxis with plavix, statin.  71F with large PCA territory infarction now with cognitive deficits, weakness, impaired mobility and ADL's    Pending patients formal assessment by therapy staff, patient is a candidate for subacute rehabilitation given her relatively high/independent level of prior function relative to her current clear deficits as exhibited on examination with weakness on manual motor testing, cognitive deficits, impaired command following, aphasia with speech components intact. She would benefit from PT/OT and SLP program in a subacute setting.    Spoke with patients daughter, they prefer [facility] if possible given proximity to patients family and positive prior experience at this facility.    For patients aphasia, she may be a candidate for aricept for aphasia recovery, however will reserve from formal recommendation until patient is evaluated by PT/OT/SLP and while other metabolic issues are being addressed/worked up., leukocytosis etc.    Consider melatonin 5mg QHS to help establish sleep/wake cycle to reduce episodic delirium. Encourage staff to open blinds and allow natural light exposure during the day.    She is on appropriate secondary stroke prophylaxis with plavix, statin.

## 2021-04-15 NOTE — SPEECH LANGUAGE PATHOLOGY EVALUATION - SLP PERTINENT HISTORY OF CURRENT PROBLEM
Per charting, "71 yr female with state hx significant for CAD s/p stents ('07), HTN, MI, liver abscess, chronic pancreatitis, DM on insulin, ESRD on HD (M/W/F) who presented to [facility] today (4/12/21) with c/o sob, found to be hypoxic on rm air, with increased wob and hypercapnic requiring bipap therapy. In addition found be hyperkalemic without ECG changes of 6.8."

## 2021-04-15 NOTE — PROGRESS NOTE ADULT - ASSESSMENT
ESRD on HD  Pulmonary edema  Hypertension  Diabetes  Hyperkalemia  Confusion, + CVA    Pt confused. CT results noted. Pt refused HD today. Next HD tomorrow. PO fluid restriction. Pt with very poor out pt compliance with meds and diet. On lokelma.   Monitor blood sugar levels. Insulin coverage as needed. Monitor BP trend. Titrate BP meds as needed. Salt restriction. Neurology follow up.  04/15/21: HD today. Pt still with some agitation/confusion. To continue current meds. Neurology follow up.  ESRD on HD  Pulmonary edema  Hypertension  Diabetes  Hyperkalemia  Confusion, + CVA    Pt confused. CT results noted. Pt refused HD today. Next HD tomorrow. PO fluid restriction. Pt with very poor out pt compliance with meds and diet. On lokelma.   Monitor blood sugar levels. Insulin coverage as needed. Monitor BP trend. Titrate BP meds as needed. Salt restriction. Neurology follow up.  04/15/21: HD today. Pt unable to recognize me. Pt still with some agitation/confusion. To continue current meds. Neurology follow up. D/w pt's daughter at bedside.

## 2021-04-15 NOTE — CONSULT NOTE ADULT - ASSESSMENT
71 yr female with PMHx CAD s/p stents '07, HTN, MI, PAF, liver abscess, s/p biliary stent with removal (11/2018; 7/2/19), chronic pancreatitis, DM2 on insulin, neuropathy, ESRD (5/2018) on HD (M/W/F) who presented to ED with with progressive SOB, found to be in hypoxic resp failure, patient admitted for hypoxemic resp failure, hyperkalemia, and need for urgent HD.      - EKG showing NSR rate 77, bifascicular block, no evidence of ischemia, largely unchanged from aug 2020  -Troponins mildly elevated, peaked at .047 however her initial CKs were flat, suggesting against acute atherosclerotic plaque rupture.  - More likely 2/2 demand ischemia from ESRD. Monitor closely for the development of anginal symptoms or clinical signs of ischemia.   -CXR showing worsening CHF, patient not significantly volume overloaded on exam, TTE from 2018 showing LVEF 50-55%, repeat TTE today  - BP well controlled, monitor routine hemodynamics.  - Continue metoprolol tartrate 25mg BID  - Monitor and replete lytes, keep K>4, Mg>2.  - Strict I/Os, daily weights.    - Other cardiovascular workup will depend on clinical course.  - All other workup per primary team.  - Will continue to follow.

## 2021-04-15 NOTE — PROGRESS NOTE ADULT - SUBJECTIVE AND OBJECTIVE BOX
Patient is a 71y old  Female who presents with a chief complaint of SOB (14 Apr 2021 11:40)      INTERVAL /OVERNIGHT EVENTS: seen in HD    MEDICATIONS  (STANDING):  albuterol/ipratropium for Nebulization 3 milliLiter(s) Nebulizer every 6 hours  atorvastatin 80 milliGRAM(s) Oral at bedtime  clopidogrel Tablet 75 milliGRAM(s) Oral daily  heparin   Injectable 5000 Unit(s) SubCutaneous every 8 hours  insulin glargine Injectable (LANTUS) 12 Unit(s) SubCutaneous at bedtime  insulin lispro Injectable (ADMELOG) 5 Unit(s) SubCutaneous before breakfast  insulin lispro Injectable (ADMELOG) 5 Unit(s) SubCutaneous before lunch  insulin lispro Injectable (ADMELOG) 5 Unit(s) SubCutaneous before dinner  metoprolol tartrate 25 milliGRAM(s) Oral two times a day    MEDICATIONS  (PRN):  acetaminophen   Tablet .. 650 milliGRAM(s) Oral every 6 hours PRN Mild Pain (1 - 3)  sodium chloride 0.9% Bolus. 100 milliLiter(s) IV Bolus every 5 minutes PRN SBP LESS THAN or EQUAL to 90 mmHg      Allergies    ertapenem (Urticaria)  Purell (Rash)    Intolerances        REVIEW OF SYSTEMS:  denies    Vital Signs Last 24 Hrs  T(C): 36.7 (15 Apr 2021 13:45), Max: 37.4 (14 Apr 2021 22:00)  T(F): 98.1 (15 Apr 2021 13:45), Max: 99.4 (14 Apr 2021 22:00)  HR: 85 (15 Apr 2021 13:45) (66 - 89)  BP: 139/81 (15 Apr 2021 13:45) (104/78 - 139/81)  BP(mean): --  RR: 16 (15 Apr 2021 13:45) (16 - 19)  SpO2: 100% (15 Apr 2021 13:45) (94% - 100%)    PHYSICAL EXAM:  GENERAL: NAD, well-groomed, well-developed  HEAD:  Atraumatic, Normocephalic  EYES: EOMI, PERRLA, conjunctiva and sclera clear  ENMT: No tonsillar erythema, exudates, or enlargement; Moist mucous membranes, Good dentition, No lesions  NECK: Supple, No JVD, Normal thyroid  NERVOUS SYSTEM:  Alert & Oriented X3, Good concentration; Motor Strength 5/5 B/L upper and lower extremities; DTRs 2+ intact and symmetric  CHEST/LUNG: Clear to auscultation bilaterally; No rales, rhonchi, wheezing, or rubs  HEART: Regular rate and rhythm; No murmurs, rubs, or gallops  ABDOMEN: Soft, Nontender, Nondistended; Bowel sounds present  EXTREMITIES:  2+ Peripheral Pulses, No clubbing, cyanosis, or edema  LYMPH: No lymphadenopathy noted  SKIN: No rashes or lesions    LABS:                        10.2   16.61 )-----------( 275      ( 15 Apr 2021 11:49 )             32.2     15 Apr 2021 11:49    135    |  98     |  59     ----------------------------<  170    5.0     |  24     |  7.80     Ca    8.2        15 Apr 2021 11:49  Phos  7.5       15 Apr 2021 11:49    TPro  x      /  Alb  2.6    /  TBili  x      /  DBili  x      /  AST  x      /  ALT  x      /  AlkPhos  x      15 Apr 2021 11:49        CAPILLARY BLOOD GLUCOSE      POCT Blood Glucose.: 150 mg/dL (15 Apr 2021 11:30)  POCT Blood Glucose.: 88 mg/dL (15 Apr 2021 07:37)  POCT Blood Glucose.: 228 mg/dL (14 Apr 2021 21:02)      RADIOLOGY & ADDITIONAL TESTS:    Notes Reviewed:  [ x] YES  [ ] NO    Care Discussed with Consultants/Other Providers [x ] YES  [ ] NO

## 2021-04-15 NOTE — PROGRESS NOTE ADULT - SUBJECTIVE AND OBJECTIVE BOX
[facility]       HPI:  71 yr female with PMHx CAD s/p stents '07, HTN, MI, PAF, liver abscess, s/p biliary stent with removal (11/2018; 7/2/19), chronic pancreatitis, DM2 on insulin, neuropathy, ESRD (5/2018) on HD (M/W/F) who presented this morning (4/12/21) via EMS from home with progressive SOB and found to be in hypoxic resp failure with SPO2 of 83% on Rm Air.      In E.D. pt initially placed on NRB with improvement of SPO2 99% with eventual placement of bipap therapy secondary to increased WOB and hypercapnic resp failure with ph 7.30 PCO2 of 50 serum HCO3 of 26, CXR with bilat opacities. Pt also found to have hyperkalemia of 6.8 without ECG changes, hyperglycemic of 266, WBC of 14.2, BNP 24021. Pt received lokelma 10gms po, Reg insuline 10 units IV x1.        Consult called for hypoxic/hypercapnic resp failure and hyperkalemia secondary to ESRD   (12 Apr 2021 15:40)        SUBJECTIVE:      ALLERGIES:  Allergies    ertapenem (Urticaria)  Purell (Rash)    Intolerances          MEDICATIONS  (STANDING):  albuterol/ipratropium for Nebulization 3 milliLiter(s) Nebulizer every 6 hours  atorvastatin 80 milliGRAM(s) Oral at bedtime  clopidogrel Tablet 75 milliGRAM(s) Oral daily  heparin   Injectable 5000 Unit(s) SubCutaneous every 8 hours  insulin glargine Injectable (LANTUS) 12 Unit(s) SubCutaneous at bedtime  insulin lispro Injectable (ADMELOG) 5 Unit(s) SubCutaneous before breakfast  insulin lispro Injectable (ADMELOG) 5 Unit(s) SubCutaneous before lunch  insulin lispro Injectable (ADMELOG) 5 Unit(s) SubCutaneous before dinner  metoprolol tartrate 25 milliGRAM(s) Oral two times a day    MEDICATIONS  (PRN):  acetaminophen   Tablet .. 650 milliGRAM(s) Oral every 6 hours PRN Mild Pain (1 - 3)  sodium chloride 0.9% Bolus. 100 milliLiter(s) IV Bolus every 5 minutes PRN SBP LESS THAN or EQUAL to 90 mmHg      REVIEW OF SYSTEMS:  CONSTITUTIONAL: No fever,  RESPIRATORY: No cough, wheezing, shortness of breath  CARDIOVASCULAR: No chest pain, dyspnea, palpitations, dizziness, syncope, paroxysmal nocturnal dyspnea, orthopnea, or arm or leg swelling  GASTROINTESTINAL: No abdominal  or epigastric pain, nausea, vomiting,  diarrhea  NEUROLOGICAL: No headaches,  loss of strength, numbness, or tremors    Vital Signs Last 24 Hrs  T(C): 37.1 (15 Apr 2021 19:15), Max: 37.1 (15 Apr 2021 19:15)  T(F): 98.7 (15 Apr 2021 19:15), Max: 98.7 (15 Apr 2021 19:15)  HR: 83 (15 Apr 2021 21:09) (66 - 96)  BP: 100/56 (15 Apr 2021 19:15) (100/56 - 139/81)  BP(mean): --  RR: 17 (15 Apr 2021 19:15) (16 - 19)  SpO2: 93% (15 Apr 2021 21:09) (93% - 100%)    PHYSICAL EXAM:  HEAD:  Atraumatic, Normocephalic  NECK: Supple and normal thyroid.  No JVD or carotid bruit.   HEART: S1, S2 regular , 1/6 soft ejection systolic murmur in mitral area , no thrill and no gallops .  PULMONARY: Bilateral vesicular breathing , few scattered ronchi and few scattered rales are present .  ABDOMEN: Soft nontender and positive bowl sounds   EXTREMITIES:  No clubbing, cyanosis, or pedal  edema  NEUROLOGICAL: AAOX3 , no focal deficit .    LABS:                        10.2   16.61 )-----------( 275      ( 15 Apr 2021 11:49 )             32.2     04-15    135  |  98  |  59<H>  ----------------------------<  170<H>  5.0   |  24  |  7.80<H>    Ca    8.2<L>      15 Apr 2021 11:49  Phos  7.5     04-15  Mg     2.3     04-14    TPro  x   /  Alb  2.6<L>  /  TBili  x   /  DBili  x   /  AST  x   /  ALT  x   /  AlkPhos  x   04-15    CARDIAC MARKERS ( 14 Apr 2021 08:13 )  .038 ng/mL / x     / x     / x     / x              BNP      EKG:  ECHO:  IMAGING:    Assessment/Plan    Will continue to follow during hospital course and give further recommendations as needed . Thanks for your referral . if any questions please contact me at office ([phone] [phone])  YONNY RIVERO MD [facility]  [address] [id number]  [address]  OFFICE : [phone]  CELL : [phone]    CARDIOLOGY F/U :      HPI:  71 yr female with PMHx CAD s/p stents '07, HTN, MI, PAF, liver abscess, s/p biliary stent with removal (11/2018; 7/2/19), chronic pancreatitis, DM2 on insulin, neuropathy, ESRD (5/2018) on HD (M/W/F) who presented this morning (4/12/21) via EMS from home with progressive SOB and found to be in hypoxic resp failure with SPO2 of 83% on Rm Air.      In E.D. pt initially placed on NRB with improvement of SPO2 99% with eventual placement of bipap therapy secondary to increased WOB and hypercapnic resp failure with ph 7.30 PCO2 of 50 serum HCO3 of 26, CXR with bilat opacities. Pt also found to have hyperkalemia of 6.8 without ECG changes, hyperglycemic of 266, WBC of 14.2, BNP 33297. Pt received lokelma 10gms po, Reg insuline 10 units IV x1.        Consult called for hypoxic/hypercapnic resp failure and hyperkalemia secondary to ESRD   (12 Apr 2021 15:40)        SUBJECTIVE: Patient lying comfortable .      ALLERGIES:  Allergies    ertapenem (Urticaria)  Purell (Rash)    Intolerances          MEDICATIONS  (STANDING):  albuterol/ipratropium for Nebulization 3 milliLiter(s) Nebulizer every 6 hours  atorvastatin 80 milliGRAM(s) Oral at bedtime  clopidogrel Tablet 75 milliGRAM(s) Oral daily  heparin   Injectable 5000 Unit(s) SubCutaneous every 8 hours  insulin glargine Injectable (LANTUS) 12 Unit(s) SubCutaneous at bedtime  insulin lispro Injectable (ADMELOG) 5 Unit(s) SubCutaneous before breakfast  insulin lispro Injectable (ADMELOG) 5 Unit(s) SubCutaneous before lunch  insulin lispro Injectable (ADMELOG) 5 Unit(s) SubCutaneous before dinner  metoprolol tartrate 25 milliGRAM(s) Oral two times a day    MEDICATIONS  (PRN):  acetaminophen   Tablet .. 650 milliGRAM(s) Oral every 6 hours PRN Mild Pain (1 - 3)  sodium chloride 0.9% Bolus. 100 milliLiter(s) IV Bolus every 5 minutes PRN SBP LESS THAN or EQUAL to 90 mmHg      REVIEW OF SYSTEMS:  CONSTITUTIONAL: No fever,  RESPIRATORY: Improvement  cough, wheezing, shortness of breath  CARDIOVASCULAR: No chest pain, dyspnea, palpitations, dizziness, syncope, paroxysmal nocturnal dyspnea, orthopnea, or arm or leg swelling  GASTROINTESTINAL: No abdominal  or epigastric pain, nausea, vomiting,  diarrhea  NEUROLOGICAL: No headaches,  numbness, or tremors  Skin : No itching .  Hematology : No active bleeding .  Endocrinology : No heat and cold intolerance .  Psychiatry : Patient is confused mildly .  Musculoskeletal : patient has mild arthritis .      Vital Signs Last 24 Hrs  T(C): 37.1 (15 Apr 2021 19:15), Max: 37.1 (15 Apr 2021 19:15)  T(F): 98.7 (15 Apr 2021 19:15), Max: 98.7 (15 Apr 2021 19:15)  HR: 83 (15 Apr 2021 21:09) (66 - 96)  BP: 100/56 (15 Apr 2021 19:15) (100/56 - 139/81)  BP(mean): --  RR: 17 (15 Apr 2021 19:15) (16 - 19)  SpO2: 93% (15 Apr 2021 21:09) (93% - 100%)    PHYSICAL EXAM:  HEAD:  Atraumatic, Normocephalic  NECK: Supple and normal thyroid.  No JVD or carotid bruit.   HEART: S1, S2 regular , 1/6 soft ejection systolic murmur in mitral area , no thrill and no gallops .  PULMONARY: Bilateral vesicular breathing , few scattered rhonchi and few scattered rales are present .  ABDOMEN: Soft nontender and positive bowl sounds   EXTREMITIES:  No clubbing, cyanosis, or pedal  edema  NEUROLOGICAL: AA and mild confused .    LABS:                        10.2   16.61 )-----------( 275      ( 15 Apr 2021 11:49 )             32.2     04-15    135  |  98  |  59<H>  ----------------------------<  170<H>  5.0   |  24  |  7.80<H>    Ca    8.2<L>      15 Apr 2021 11:49  Phos  7.5     04-15  Mg     2.3     04-14    TPro  x   /  Alb  2.6<L>  /  TBili  x   /  DBili  x   /  AST  x   /  ALT  x   /  AlkPhos  x   04-15    CARDIAC MARKERS ( 14 Apr 2021 08:13 )  .038 ng/mL / x     / x     / x     / x              Assessment/Plan  Patient has :  1) CHF and stable . Acute on chronic diastolic heart failure .  2) Acute CVA .  3) No significant cardiac arrythmia on monitor . Patient is in NSR .  4) ESRD and on hemodialysis   5) Remote one episode of atrial fibrillation more than 3 years ago and during gall bladder surgery as per notes in computer   Plan : 1) Cardiac stable so far 2) Full anticoagulation contra indicated at this time due to risk of intra cranial bleed 3) Continue Plavix 4) Monitor hemoglobin and electrolytes . 5) prognosis guarded .  Will continue to follow during hospital course and give further recommendations as needed . Thanks for your referral . if any questions please contact me at office ([phone])

## 2021-04-15 NOTE — PROGRESS NOTE ADULT - SUBJECTIVE AND OBJECTIVE BOX
Patient is a 71y old  Female who presents with a chief complaint of   Patient seen in follow up for ESRD on HD, SOB.        PAST MEDICAL HISTORY:  Diabetes    Hypertension    Myocardial infarction    Pneumonia    Hypertension    CRF (chronic renal failure)    Diabetes    CAD (coronary artery disease)    ESRD (end stage renal disease) on dialysis    Liver abscess    Acute urinary retention    Cholecystitis with cholangitis    Chronic pancreatitis    PAF (paroxysmal atrial fibrillation)    H/O diabetic neuropathy      MEDICATIONS  (STANDING):  albuterol/ipratropium for Nebulization 3 milliLiter(s) Nebulizer every 6 hours  atorvastatin 80 milliGRAM(s) Oral at bedtime  clopidogrel Tablet 75 milliGRAM(s) Oral daily  heparin   Injectable 5000 Unit(s) SubCutaneous every 8 hours  insulin glargine Injectable (LANTUS) 12 Unit(s) SubCutaneous at bedtime  insulin lispro Injectable (ADMELOG) 5 Unit(s) SubCutaneous before breakfast  insulin lispro Injectable (ADMELOG) 5 Unit(s) SubCutaneous before lunch  insulin lispro Injectable (ADMELOG) 5 Unit(s) SubCutaneous before dinner  metoprolol tartrate 25 milliGRAM(s) Oral two times a day    MEDICATIONS  (PRN):  acetaminophen   Tablet .. 650 milliGRAM(s) Oral every 6 hours PRN Mild Pain (1 - 3)  sodium chloride 0.9% Bolus. 100 milliLiter(s) IV Bolus every 5 minutes PRN SBP LESS THAN or EQUAL to 90 mmHg    T(C): 36.7 (04-15-21 @ 13:45), Max: 37.4 (04-14-21 @ 22:00)  HR: 85 (04-15-21 @ 13:45) (66 - 89)  BP: 139/81 (04-15-21 @ 13:45) (104/78 - 161/70)  RR: 16 (04-15-21 @ 13:45)  SpO2: 100% (04-15-21 @ 13:45)  Wt(kg): --  I&O's Detail    14 Apr 2021 07:01  -  15 Apr 2021 07:00  --------------------------------------------------------  IN:    Oral Fluid: 480 mL  Total IN: 480 mL    OUT:    Voided (mL): 300 mL  Total OUT: 300 mL    Total NET: 180 mL      15 Apr 2021 07:01  -  15 Apr 2021 15:10  --------------------------------------------------------  IN:  Total IN: 0 mL    OUT:    Other (mL): 1300 mL  Total OUT: 1300 mL    Total NET: -1300 mL              PHYSICAL EXAM:  General: No distress  Respiratory: b/l air entry  Cardiovascular: S1 S2  Gastrointestinal: soft  Extremities:  edema, left AVF                          LABORATORY:                        10.2   16.61 )-----------( 275      ( 15 Apr 2021 11:49 )             32.2     04-15    135  |  98  |  59<H>  ----------------------------<  170<H>  5.0   |  24  |  7.80<H>    Ca    8.2<L>      15 Apr 2021 11:49  Phos  7.5     04-15  Mg     2.3     04-14    TPro  x   /  Alb  2.6<L>  /  TBili  x   /  DBili  x   /  AST  x   /  ALT  x   /  AlkPhos  x   04-15    Sodium, Serum: 135 mmol/L (04-15 @ 11:49)  Sodium, Serum: 135 mmol/L (04-15 @ 11:49)  Sodium, Serum: 135 mmol/L (04-14 @ 08:13)    Potassium, Serum: 5.0 mmol/L (04-15 @ 11:49)  Potassium, Serum: 5.0 mmol/L (04-15 @ 11:49)  Potassium, Serum: 4.9 mmol/L (04-14 @ 08:13)    Hemoglobin: 10.2 g/dL (04-15 @ 11:49)  Hemoglobin: 10.2 g/dL (04-14 @ 09:23)  Hemoglobin: 10.3 g/dL (04-13 @ 07:09)  Hemoglobin: 11.6 g/dL (04-12 @ 18:08)    Creatinine, Serum 7.80 (04-15 @ 11:49)  Creatinine, Serum 6.30 (04-14 @ 08:13)  Creatinine, Serum 4.80 (04-13 @ 07:09)  Creatinine, Serum 2.40 (04-12 @ 18:29)    CARDIAC MARKERS ( 14 Apr 2021 08:13 )  .038 ng/mL / x     / x     / x     / x          LIVER FUNCTIONS - ( 15 Apr 2021 11:49 )  Alb: 2.6 g/dL / Pro: x     / ALK PHOS: x     / ALT: x     / AST: x     / GGT: x

## 2021-04-15 NOTE — PROGRESS NOTE ADULT - SUBJECTIVE AND OBJECTIVE BOX
Neurology follow up note    IRENE [id number]      Interval History:    Patient undergoing HD     MEDICATIONS    acetaminophen   Tablet .. 650 milliGRAM(s) Oral every 6 hours PRN  albuterol/ipratropium for Nebulization 3 milliLiter(s) Nebulizer every 6 hours  aspirin enteric coated 325 milliGRAM(s) Oral daily  atorvastatin 80 milliGRAM(s) Oral at bedtime  heparin   Injectable 5000 Unit(s) SubCutaneous every 8 hours  insulin glargine Injectable (LANTUS) 12 Unit(s) SubCutaneous at bedtime  insulin lispro Injectable (ADMELOG) 5 Unit(s) SubCutaneous before breakfast  insulin lispro Injectable (ADMELOG) 5 Unit(s) SubCutaneous before lunch  insulin lispro Injectable (ADMELOG) 5 Unit(s) SubCutaneous before dinner  metoprolol tartrate 25 milliGRAM(s) Oral two times a day  sodium chloride 0.9% Bolus. 100 milliLiter(s) IV Bolus every 5 minutes PRN      Allergies    ertapenem (Urticaria)  Purell (Rash)    Intolerances            Vital Signs Last 24 Hrs  T(C): 36.7 (15 Apr 2021 10:15), Max: 37.4 (14 Apr 2021 22:00)  T(F): 98 (15 Apr 2021 10:15), Max: 99.4 (14 Apr 2021 22:00)  HR: 85 (15 Apr 2021 10:15) (66 - 89)  BP: 123/67 (15 Apr 2021 10:15) (104/78 - 137/81)  BP(mean): --  RR: 18 (15 Apr 2021 10:15) (18 - 19)  SpO2: 97% (15 Apr 2021 10:15) (94% - 97%)      REVIEW OF SYSTEMS:  Very limited but feels ok no significant numbness in feet     PHYSICAL EXAMINATION:  .  HEENT:  Head:  Normocephalic, atraumatic.  Eyes:  No scleral icterus.  Ears:  Hearing appeared to be intact.  NECK:  Supple.  CARDIOVASCULAR:  S1 and S2 heard.  RESPIRATORY:  Good air entry bilaterally.  ABDOMEN:  Soft, nontender.  EXTREMITIES:  No clubbing or cyanosis were noted.      NEUROLOGIC:  The patient is awake and alert.  Location was home  Able to say daughter's name.  Year and month was unknown.  was able to tell number of finger in each hand   Extraocular movements were intact.  Speech was fluent.  Motor:  Bilateral upper 4/5.  Bilateral lower extremities with plantar stimulation, slight flexation at the hip and knee, would say overall 3-/5.  The patient would not follow complex or simple commands.  patient would become anger easily yelling not to be touched             LABS:  CBC Full  -  ( 15 Apr 2021 11:49 )  WBC Count : 16.61 K/uL  RBC Count : 3.51 M/uL  Hemoglobin : 10.2 g/dL  Hematocrit : 32.2 %  Platelet Count - Automated : 275 K/uL  Mean Cell Volume : 91.7 fl  Mean Cell Hemoglobin : 29.1 pg  Mean Cell Hemoglobin Concentration : 31.7 gm/dL  Auto Neutrophil # : x  Auto Lymphocyte # : x  Auto Monocyte # : x  Auto Eosinophil # : x  Auto Basophil # : x  Auto Neutrophil % : x  Auto Lymphocyte % : x  Auto Monocyte % : x  Auto Eosinophil % : x  Auto Basophil % : x      04-15    135  |  98  |  59<H>  ----------------------------<  170<H>  5.0   |  24  |  7.80<H>    Ca    8.2<L>      15 Apr 2021 11:49  Mg     2.3     04-14    TPro  x   /  Alb  2.6<L>  /  TBili  x   /  DBili  x   /  AST  x   /  ALT  x   /  AlkPhos  x   04-15    Hemoglobin A1C:     LIVER FUNCTIONS - ( 15 Apr 2021 11:49 )  Alb: 2.6 g/dL / Pro: x     / ALK PHOS: x     / ALT: x     / AST: x     / GGT: x           Vitamin B12         RADIOLOGY    ANALYSIS AND PLAN:  This is a 71-year-old with episode of altered mental status.    For episode of altered mental status, suspect most likely this is metabolic in nature, questionable secondary to any type of hospital induced delirium psychosis  mental status is better    MRI imaging of the brain noted   For history of diabetes, strict control of blood sugars.  For history of neuropathy, the patient does have elevated renal function.  I would recommend Nephrology followup in regards to possibly adjustment dose of the patient's gabapentin decreased to 100 tid  For history of hypertension, monitor systolic blood pressure.  ct chest noted antibiotics as needed   For paroxysmal atrial fibrillation, telemetry evaluation.  Cardiology followup as needed  for CVA will dc asa change to plavix  if patient  does have   AFIB would recommends dc plavix and start AC   follow up echo     Spoke with daughter, Frederick; her telephone number is [phone] 4/15/2021 use her to translate and ask mom questions       Greater than 45 minutes of time was spent with the patient, plan of care, reviewing data, with greater than 50% of the visit was spent counseling and/or coordinating care with multidisciplinary healthcare team

## 2021-04-16 LAB — TROPONIN I SERPL-MCNC: 0.16 NG/ML — HIGH (ref 0.01–0.04)

## 2021-04-16 RX ADMIN — Medication 3 MILLILITER(S): at 08:27

## 2021-04-16 RX ADMIN — Medication 5 UNIT(S): at 17:11

## 2021-04-16 RX ADMIN — HEPARIN SODIUM 5000 UNIT(S): 5000 INJECTION INTRAVENOUS; SUBCUTANEOUS at 21:31

## 2021-04-16 RX ADMIN — INSULIN GLARGINE 12 UNIT(S): 100 INJECTION, SOLUTION SUBCUTANEOUS at 21:31

## 2021-04-16 RX ADMIN — Medication 5 UNIT(S): at 08:30

## 2021-04-16 RX ADMIN — HEPARIN SODIUM 5000 UNIT(S): 5000 INJECTION INTRAVENOUS; SUBCUTANEOUS at 05:10

## 2021-04-16 RX ADMIN — Medication 5 UNIT(S): at 12:37

## 2021-04-16 RX ADMIN — ATORVASTATIN CALCIUM 80 MILLIGRAM(S): 80 TABLET, FILM COATED ORAL at 21:31

## 2021-04-16 RX ADMIN — CLOPIDOGREL BISULFATE 75 MILLIGRAM(S): 75 TABLET, FILM COATED ORAL at 12:38

## 2021-04-16 RX ADMIN — HEPARIN SODIUM 5000 UNIT(S): 5000 INJECTION INTRAVENOUS; SUBCUTANEOUS at 13:27

## 2021-04-16 NOTE — PROGRESS NOTE ADULT - ASSESSMENT
ESRD on HD  Pulmonary edema  Hypertension  Diabetes  Hyperkalemia  Confusion, + CVA    Pt confused. CT results noted. Pt refused HD today. Next HD tomorrow. PO fluid restriction. Pt with very poor out pt compliance with meds and diet. On lokelma.   Monitor blood sugar levels. Insulin coverage as needed. Monitor BP trend. Titrate BP meds as needed. Salt restriction. Neurology follow up.  04/15/21: HD today. Pt unable to recognize me. Pt still with some agitation/confusion. To continue current meds. Neurology follow up. D/w pt's daughter at bedside.   04/16/21: Next HD tomorrow. To continue current meds. Fluid removal as tolerated by BP.

## 2021-04-16 NOTE — SWALLOW BEDSIDE ASSESSMENT ADULT - SWALLOW EVAL: RECOMMENDED DIET
1- will defer PO diet to MD given pt refusal at this time. 2- consider nutrition consult to ensure adequate daily caloric intake given pt refusal.

## 2021-04-16 NOTE — SWALLOW BEDSIDE ASSESSMENT ADULT - SPECIFY REASON(S)
to assess swallow function
to assess swallow function
to assess swallow function. pt familiar to this service from previous attempts at clinical swallow evaluation made during this admission (please see notes in [facility])

## 2021-04-16 NOTE — PROGRESS NOTE ADULT - SUBJECTIVE AND OBJECTIVE BOX
CAPILLARY BLOOD GLUCOSE      POCT Blood Glucose.: 206 mg/dL (15 Apr 2021 22:13)  POCT Blood Glucose.: 284 mg/dL (15 Apr 2021 16:49)  POCT Blood Glucose.: 150 mg/dL (15 Apr 2021 11:30)  POCT Blood Glucose.: 88 mg/dL (15 Apr 2021 07:37)      Vital Signs Last 24 Hrs  T(C): 36.7 (16 Apr 2021 05:08), Max: 37.1 (15 Apr 2021 19:15)  T(F): 98.1 (16 Apr 2021 05:08), Max: 98.7 (15 Apr 2021 19:15)  HR: 80 (16 Apr 2021 05:08) (68 - 96)  BP: 126/67 (16 Apr 2021 05:08) (100/56 - 139/81)  BP(mean): --  RR: 17 (16 Apr 2021 05:08) (16 - 19)  SpO2: 96% (16 Apr 2021 05:08) (93% - 100%)      Respiratory: CTA B/L  CV: RRR, S1S2, no murmurs, rubs or gallops  Abdominal: Soft, NT, ND +BS, Last BM  Extremities: No edema, + peripheral pulses     04-15    135  |  98  |  59<H>  ----------------------------<  170<H>  5.0   |  24  |  7.80<H>    Ca    8.2<L>      15 Apr 2021 11:49  Phos  7.5     04-15  Mg     2.3     04-14    TPro  x   /  Alb  2.6<L>  /  TBili  x   /  DBili  x   /  AST  x   /  ALT  x   /  AlkPhos  x   04-15      atorvastatin 80 milliGRAM(s) Oral at bedtime  insulin glargine Injectable (LANTUS) 12 Unit(s) SubCutaneous at bedtime  insulin lispro Injectable (ADMELOG) 5 Unit(s) SubCutaneous before breakfast  insulin lispro Injectable (ADMELOG) 5 Unit(s) SubCutaneous before lunch  insulin lispro Injectable (ADMELOG) 5 Unit(s) SubCutaneous before dinner

## 2021-04-16 NOTE — SWALLOW BEDSIDE ASSESSMENT ADULT - SLP PERTINENT HISTORY OF CURRENT PROBLEM
Per charting, "71 yr female with state hx significant for CAD s/p stents ('07), HTN, MI, liver abscess, chronic pancreatitis, DM on insulin, ESRD on HD (M/W/F) who presented to [facility] today (4/12/21) with c/o sob, found to be hypoxic on rm air, with increased wob and hypercapnic requiring bipap therapy. In addition found be hyperkalemic without ECG changes of 6.8."
Per charting, "71 yr female with state hx significant for CAD s/p stents ('07), HTN, MI, liver abscess, chronic pancreatitis, DM on insulin, ESRD on HD (M/W/F) who presented to [facility] today (4/12/21) with c/o sob, found to be hypoxic on rm air, with increased wob and hypercapnic requiring bipap therapy. In addition found be hyperkalemic without ECG changes of 6.8."
r/o dysphagia

## 2021-04-16 NOTE — SWALLOW BEDSIDE ASSESSMENT ADULT - SWALLOW EVAL: CURRENT DIET
soft solids, no liquids, per MD order
dysphagia 3 with no liquids, per MD order
soft solids, no liquids, per MD order

## 2021-04-16 NOTE — OCCUPATIONAL THERAPY INITIAL EVALUATION ADULT - RANGE OF MOTION EXAMINATION, UPPER EXTREMITY
Fine motor skills: WFL's right UE, appears impaired left UE./Left UE Active Assistive ROM was WFL  (within functional limits)/Right UE Active ROM was WFL (within functional limits)

## 2021-04-16 NOTE — GOALS OF CARE CONVERSATION - ADVANCED CARE PLANNING - CONVERSATION DETAILS
Writer spoke with dtr /HCP Frederick,pt is combative and uncooperative. Reviewed patient's medical and social history as well as events leading to patient's hospitalization. Writer discussed patient's current diagnosis (SOB,DM,PAF,ESRD,CAD,HTN), medical condition and management, prognosis, and life expectancy. Inquired about patient's wishes regarding extent of medical care to be provided including escalation of medical care into the ICU and use of vasopressor support. In addition, the writer inquired about thoughts regarding cardiopulmnary resuscitation, artificial nutrition and hydration including use of feeding tubes and IVF, antibiotics, and further investigative studies such as blood draws and radiology.Frederick showed some insight into medical condition. All questions answered. Writer recommended she discuss when she can her mothers wishes with her.Dtr states pt is to have all done FULL CODE.  Psychosocial support provided. Writer spoke with dtr /HCP Frederick,pt is combative and uncooperative. Reviewed patient's medical and social history as well as events leading to patient's hospitalization. Writer discussed patient's current diagnosis (SOB,DM,PAF,ESRD,CAD,HTN), medical condition and management, prognosis, and life expectancy. Inquired about patient's wishes regarding extent of medical care to be provided including escalation of medical care into the ICU and use of vasopressor support. In addition, the writer inquired about thoughts regarding cardiopulmnary resuscitation, artificial nutrition and hydration including use of feeding tubes and IVF, antibiotics, and further investigative studies such as blood draws and radiology.Frederick showed some insight into medical condition. All questions answered. Writer recommended she discuss when she can her mothers wishes with her.Dtr states pt is to have all done FULL CODE.  Psychosocial support provided.HCP on chart

## 2021-04-16 NOTE — OCCUPATIONAL THERAPY INITIAL EVALUATION ADULT - BED MOBILITY TRAINING, PT EVAL
Patient will perform bed mobility skills (supine to sit and sit to supine) with moderate assistance in 3-5 sessions.

## 2021-04-16 NOTE — SWALLOW BEDSIDE ASSESSMENT ADULT - SWALLOW EVAL: DIAGNOSIS
unable to assess oral-pharyngeal function at this time given adamant refusal of various PO trials presented by SLP (apple sauce via teaspoon, water via cup sip). pt remained with pursed lips, shaking head in refusal despite maximum verbal encouragement provided by SLP. With continued attempts, pt became increasingly agitated and began pushing utensil away and yelling in native language of Roberto. Translation services attempted, however, pt with increased agitation disallowing for completion of call to [facility].

## 2021-04-16 NOTE — PHYSICAL THERAPY INITIAL EVALUATION ADULT - PERTINENT HX OF CURRENT PROBLEM, REHAB EVAL
71 yr female with PMHx CAD s/p stents '07, HTN, MI, PAF, liver abscess, s/p biliary stent with removal (11/2018; 7/2/19), chronic pancreatitis, DM2 on insulin, neuropathy, ESRD (5/2018) on HD (M/W/F) who presented this morning (4/12/21) via EMS from home with progressive SOB and found to be in hypoxic resp failure with SPO2 of 83% on Rm Air.

## 2021-04-16 NOTE — PROGRESS NOTE ADULT - SUBJECTIVE AND OBJECTIVE BOX
Patient is a 71y old  Female who presents with a chief complaint of CHF (15 Apr 2021 22:24)      INTERVAL HPI/OVERNIGHT EVENTS:    offers no complaints. more alert today    MEDICATIONS  (STANDING):  albuterol/ipratropium for Nebulization 3 milliLiter(s) Nebulizer every 6 hours  atorvastatin 80 milliGRAM(s) Oral at bedtime  clopidogrel Tablet 75 milliGRAM(s) Oral daily  heparin   Injectable 5000 Unit(s) SubCutaneous every 8 hours  insulin glargine Injectable (LANTUS) 12 Unit(s) SubCutaneous at bedtime  insulin lispro Injectable (ADMELOG) 5 Unit(s) SubCutaneous before breakfast  insulin lispro Injectable (ADMELOG) 5 Unit(s) SubCutaneous before lunch  insulin lispro Injectable (ADMELOG) 5 Unit(s) SubCutaneous before dinner  metoprolol tartrate 25 milliGRAM(s) Oral two times a day      MEDICATIONS  (PRN):  acetaminophen   Tablet .. 650 milliGRAM(s) Oral every 6 hours PRN Mild Pain (1 - 3)  sodium chloride 0.9% Bolus. 100 milliLiter(s) IV Bolus every 5 minutes PRN SBP LESS THAN or EQUAL to 90 mmHg      Allergies    ertapenem (Urticaria)  Purell (Rash)    Intolerances        PAST MEDICAL & SURGICAL HISTORY:  Hypertension    Myocardial infarction  2007    Diabetes  Type 2 DM    CAD (coronary artery disease)  s/p stent x 1 in 2007    ESRD (end stage renal disease) on dialysis  MWF since 05/2018    Liver abscess  resolved    Acute urinary retention    Cholecystitis with cholangitis    Chronic pancreatitis    PAF (paroxysmal atrial fibrillation)  1 episode in 11/2018 while hospitalized for acute cholecystitis    H/O diabetic neuropathy    H/O: hysterectomy  1990    History of biliary stent insertion  11/2018 &amp; removal 7/2/19    S/P arteriovenous (AV) fistula creation  05/17/2019    Cataract  IOL b/l        Vital Signs Last 24 Hrs  T(C): 37.1 (16 Apr 2021 08:32), Max: 37.1 (16 Apr 2021 08:32)  T(F): 98.7 (16 Apr 2021 08:32), Max: 98.7 (16 Apr 2021 08:32)  HR: 75 (16 Apr 2021 08:32) (75 - 83)  BP: 150/78 (16 Apr 2021 08:32) (126/67 - 150/78)  BP(mean): --  RR: 18 (16 Apr 2021 08:32) (17 - 18)  SpO2: 96% (16 Apr 2021 08:32) (93% - 98%)    PHYSICAL EXAMINATION:    GENERAL: The patient is awake and alert in no apparent distress.     HEENT: Head is normocephalic and atraumatic. Extraocular muscles are intact. Mucous membranes are moist.    NECK: Supple.    LUNGS: Clear to auscultation without wheezing, rales or rhonchi; respirations unlabored    HEART: Regular rate and rhythm without murmur.    ABDOMEN: Soft, nontender, and nondistended.      EXTREMITIES: Without any cyanosis, clubbing, rash, lesions or edema.    NEUROLOGIC: Grossly intact.    SKIN: No ulceration or induration present.      LABS:                        10.2   16.61 )-----------( 275      ( 15 Apr 2021 11:49 )             32.2     04-15    135  |  98  |  59<H>  ----------------------------<  170<H>  5.0   |  24  |  7.80<H>    Ca    8.2<L>      15 Apr 2021 11:49  Phos  7.5     04-15    TPro  x   /  Alb  2.6<L>  /  TBili  x   /  DBili  x   /  AST  x   /  ALT  x   /  AlkPhos  x   04-15          CARDIAC MARKERS ( 16 Apr 2021 05:16 )  .164 ng/mL / x     / x     / x     / x            Assessment:    Acute Hypoxic Respiratory Failure  S/P CVA  ESRD  Diabetes type 2  Coronary artery disease        Plan:    supplemental oxygen and follow pulse oximetry  Dialysis as per nephrology  SQ heparin for DVT prophylaxis  Duoneb QID

## 2021-04-16 NOTE — OCCUPATIONAL THERAPY INITIAL EVALUATION ADULT - GENERAL OBSERVATIONS, REHAB EVAL
Patient found supine in bed in no apparent distress on room air. Patient speaks Roberto.  phone utilized;  #[phone]. Pt appeared confused and required assist to hold phone to her ear to communicate using phone.

## 2021-04-16 NOTE — SPEECH LANGUAGE PATHOLOGY EVALUATION - SPECIFY REASON(S)
Agree with your advise, can we move her same day to a 30 min spot (can use HFU spots that usually never fill up)
Left a VM (at preferred number )-  Awaiting for Pt to call back to triage.
S/w pt. States that since the Thursday before Easter (about one and a half weeks now) pt has been waking up with increased SOB. She states she sleeps well at night. Denies NOC.  But in the morning she wakes up \"not breathing right\" and uses her Xopenex HF
appt changed to 30 minute spot on the same day. Pt aware and agreeable.
to assess speech and language function
to assess communicative function

## 2021-04-16 NOTE — OCCUPATIONAL THERAPY INITIAL EVALUATION ADULT - PERTINENT HX OF CURRENT PROBLEM, REHAB EVAL
MR brain4/14 +lg acute infarct involving L PCA territory, add'l small infarct involving R ventral deysi. 70 y/o female with PMH CAD s/p stents '07, HTN, MI, PAF, liver abscess, s/p biliary stent with removal (11/2018; 7/2/19), chronic pancreatitis, DM2 on insulin, neuropathy, ESRD (5/2018) on HD (M/W/F) admitted with SOB 4/12/2021. MR brain 4/14 +lg acute infarct involving L PCA territory, add'l small infarct involving R ventral deysi.

## 2021-04-16 NOTE — PROGRESS NOTE ADULT - SUBJECTIVE AND OBJECTIVE BOX
Patient is a 71y old  Female who presents with a chief complaint of   Patient seen in follow up for ESRD on HD, SOB.        PAST MEDICAL HISTORY:  Diabetes    Hypertension    Myocardial infarction    Pneumonia    Hypertension    CRF (chronic renal failure)    Diabetes    CAD (coronary artery disease)    ESRD (end stage renal disease) on dialysis    Liver abscess    Acute urinary retention    Cholecystitis with cholangitis    Chronic pancreatitis    PAF (paroxysmal atrial fibrillation)    H/O diabetic neuropathy      MEDICATIONS  (STANDING):  albuterol/ipratropium for Nebulization 3 milliLiter(s) Nebulizer every 6 hours  atorvastatin 80 milliGRAM(s) Oral at bedtime  clopidogrel Tablet 75 milliGRAM(s) Oral daily  heparin   Injectable 5000 Unit(s) SubCutaneous every 8 hours  insulin glargine Injectable (LANTUS) 12 Unit(s) SubCutaneous at bedtime  insulin lispro Injectable (ADMELOG) 5 Unit(s) SubCutaneous before breakfast  insulin lispro Injectable (ADMELOG) 5 Unit(s) SubCutaneous before lunch  insulin lispro Injectable (ADMELOG) 5 Unit(s) SubCutaneous before dinner  metoprolol tartrate 25 milliGRAM(s) Oral two times a day    MEDICATIONS  (PRN):  acetaminophen   Tablet .. 650 milliGRAM(s) Oral every 6 hours PRN Mild Pain (1 - 3)  sodium chloride 0.9% Bolus. 100 milliLiter(s) IV Bolus every 5 minutes PRN SBP LESS THAN or EQUAL to 90 mmHg    T(C): 37.1 (04-16-21 @ 08:32), Max: 37.4 (04-14-21 @ 22:00)  HR: 75 (04-16-21 @ 08:32) (66 - 96)  BP: 150/78 (04-16-21 @ 08:32) (100/56 - 150/78)  RR: 18 (04-16-21 @ 08:32)  SpO2: 96% (04-16-21 @ 08:32)  Wt(kg): --  I&O's Detail    15 Apr 2021 07:01  -  16 Apr 2021 07:00  --------------------------------------------------------  IN:  Total IN: 0 mL    OUT:    Other (mL): 1300 mL  Total OUT: 1300 mL    Total NET: -1300 mL                    PHYSICAL EXAM:  General: No distress  Respiratory: b/l air entry  Cardiovascular: S1 S2  Gastrointestinal: soft  Extremities:  edema, left AVF                            LABORATORY:                        10.2   16.61 )-----------( 275      ( 15 Apr 2021 11:49 )             32.2     04-15    135  |  98  |  59<H>  ----------------------------<  170<H>  5.0   |  24  |  7.80<H>    Ca    8.2<L>      15 Apr 2021 11:49  Phos  7.5     04-15    TPro  x   /  Alb  2.6<L>  /  TBili  x   /  DBili  x   /  AST  x   /  ALT  x   /  AlkPhos  x   04-15    Sodium, Serum: 135 mmol/L (04-15 @ 11:49)  Sodium, Serum: 135 mmol/L (04-15 @ 11:49)    Potassium, Serum: 5.0 mmol/L (04-15 @ 11:49)  Potassium, Serum: 5.0 mmol/L (04-15 @ 11:49)    Hemoglobin: 10.2 g/dL (04-15 @ 11:49)  Hemoglobin: 10.2 g/dL (04-14 @ 09:23)    Creatinine, Serum 7.80 (04-15 @ 11:49)  Creatinine, Serum 6.30 (04-14 @ 08:13)    CARDIAC MARKERS ( 16 Apr 2021 05:16 )  .164 ng/mL / x     / x     / x     / x          LIVER FUNCTIONS - ( 15 Apr 2021 11:49 )  Alb: 2.6 g/dL / Pro: x     / ALK PHOS: x     / ALT: x     / AST: x     / GGT: x

## 2021-04-16 NOTE — SPEECH LANGUAGE PATHOLOGY EVALUATION - COMMENTS
Per charting, pt is a "71 yr female with PMHx CAD s/p stents '07, HTN, MI, PAF, liver abscess, s/p biliary stent with removal (11/2018; 7/2/19), chronic pancreatitis, DM2 on insulin, neuropathy, ESRD (5/2018) on HD (M/W/F) who presented this morning (4/12/21) via EMS from home with progressive SOB and found to be in hypoxic resp failure with SPO2 of 83% on Rm Air." She was subsequently admitted for SOB. Recent chest CT revealed "1. A small right pleural effusion.2. A right basal consolidation, not excluding pneumonia, pneumonitis ,aspiration or other etiology.3. A mild patchy and ground-glass opacities of the lungs bilaterally."    At the time of today's assessment, the pt presents as lethargic though arousable with tactile stimulation provided by SLP. Consult was also placed for a bedside swallow evaluation which was attempted upon SLP entry to pt room (please see note for full details). During attempt at clinical swallow evaluation, pt became increasingly agitated and began pushing utensil away and yelling in native language of [place]. Translation services attempted, however, pt with increased agitation disallowing for completion of call to [facility]. Thus, formal speech-language evaluation was unable to be completed at this time 2/2 pt behavioral overlay.    Would recommend MD reconsult as needed upon improvement in pt status. Discussed with RN with call out to MD.
Consult received and chart reviewed. Attempted speech and language assessment this PM. Upon arrival, pt off the unit at dialysis. Will f/u as schedule permits.

## 2021-04-16 NOTE — PROGRESS NOTE ADULT - SUBJECTIVE AND OBJECTIVE BOX
Patient is a 71y old  Female who presents with a chief complaint of CHF (15 Apr 2021 22:24)      INTERVAL /OVERNIGHT EVENTS: alert awake and non compliant    MEDICATIONS  (STANDING):  albuterol/ipratropium for Nebulization 3 milliLiter(s) Nebulizer every 6 hours  atorvastatin 80 milliGRAM(s) Oral at bedtime  clopidogrel Tablet 75 milliGRAM(s) Oral daily  heparin   Injectable 5000 Unit(s) SubCutaneous every 8 hours  insulin glargine Injectable (LANTUS) 12 Unit(s) SubCutaneous at bedtime  insulin lispro Injectable (ADMELOG) 5 Unit(s) SubCutaneous before breakfast  insulin lispro Injectable (ADMELOG) 5 Unit(s) SubCutaneous before lunch  insulin lispro Injectable (ADMELOG) 5 Unit(s) SubCutaneous before dinner  metoprolol tartrate 25 milliGRAM(s) Oral two times a day    MEDICATIONS  (PRN):  acetaminophen   Tablet .. 650 milliGRAM(s) Oral every 6 hours PRN Mild Pain (1 - 3)  sodium chloride 0.9% Bolus. 100 milliLiter(s) IV Bolus every 5 minutes PRN SBP LESS THAN or EQUAL to 90 mmHg      Allergies    ertapenem (Urticaria)  Purell (Rash)    Intolerances        REVIEW OF SYSTEMS:  denies    Vital Signs Last 24 Hrs  T(C): 37.1 (16 Apr 2021 08:32), Max: 37.1 (16 Apr 2021 08:32)  T(F): 98.7 (16 Apr 2021 08:32), Max: 98.7 (16 Apr 2021 08:32)  HR: 75 (16 Apr 2021 08:32) (75 - 83)  BP: 150/78 (16 Apr 2021 08:32) (126/67 - 150/78)  BP(mean): --  RR: 18 (16 Apr 2021 08:32) (17 - 18)  SpO2: 96% (16 Apr 2021 08:32) (93% - 98%)    PHYSICAL EXAM:  GENERAL: NAD, well-groomed, well-developed  HEAD:  Atraumatic, Normocephalic  EYES: EOMI, PERRLA, conjunctiva and sclera clear  ENMT: No tonsillar erythema, exudates, or enlargement; Moist mucous membranes, Good dentition, No lesions  NECK: Supple, No JVD, Normal thyroid  NERVOUS SYSTEM:  Alert & Oriented X3, Good concentration; Motor Strength 5/5 B/L upper and lower extremities; DTRs 2+ intact and symmetric  CHEST/LUNG: Clear to auscultation bilaterally; No rales, rhonchi, wheezing, or rubs  HEART: Regular rate and rhythm; No murmurs, rubs, or gallops  ABDOMEN: Soft, Nontender, Nondistended; Bowel sounds present  EXTREMITIES:  2+ Peripheral Pulses, No clubbing, cyanosis, or edema  LYMPH: No lymphadenopathy noted  SKIN: No rashes or lesions    LABS:      Ca    8.2        15 Apr 2021 11:49          CAPILLARY BLOOD GLUCOSE      POCT Blood Glucose.: 191 mg/dL (16 Apr 2021 16:59)  POCT Blood Glucose.: 120 mg/dL (16 Apr 2021 11:47)  POCT Blood Glucose.: 132 mg/dL (16 Apr 2021 07:37)  POCT Blood Glucose.: 206 mg/dL (15 Apr 2021 22:13)      RADIOLOGY & ADDITIONAL TESTS:    Notes Reviewed:  [x ] YES  [ ] NO    Care Discussed with Consultants/Other Providers [x ] YES  [ ] NO

## 2021-04-16 NOTE — PHYSICAL THERAPY INITIAL EVALUATION ADULT - GAIT DEVIATIONS NOTED, PT EVAL
R foot drag/decreased reyna/increased time in double stance/decreased step length/decreased stride length

## 2021-04-16 NOTE — SWALLOW BEDSIDE ASSESSMENT ADULT - COMMENTS
Consult received and chart reviewed. Second attempt made for clinical swallow assessment this PM. Upon arrival, pt off the unit at dialysis. Will f/u as schedule permits.
Consult received and chart reviewed. Attempted clinical swallow assessment this AM. Upon arrival, pt receiving breathing treatment. Will f/u as schedule permits.
Per charting, pt is a "71 yr female with PMHx CAD s/p stents '07, HTN, MI, PAF, liver abscess, s/p biliary stent with removal (11/2018; 7/2/19), chronic pancreatitis, DM2 on insulin, neuropathy, ESRD (5/2018) on HD (M/W/F) who presented this morning (4/12/21) via EMS from home with progressive SOB and found to be in hypoxic resp failure with SPO2 of 83% on Rm Air." She was subsequently admitted for SOB. Recent chest CT revealed "1. A small right pleural effusion.2. A right basal consolidation, not excluding pneumonia, pneumonitis ,aspiration or other etiology.3. A mild patchy and ground-glass opacities of the lungs bilaterally."    At the time of today's assessment, the pt presents as lethargic though arousable with tactile stimulation provided by SLP.

## 2021-04-16 NOTE — OCCUPATIONAL THERAPY INITIAL EVALUATION ADULT - ADDITIONAL COMMENTS
Pt appeared confused and unable to provide social info. As per EMR pt resides in a 2 level house with 3-4 steps to enter and 14 steps with +chairlift to access bedroom on 2nd level. Dtr-in-law and son assisted patient with ADL's prior to hospitalization. Pt performed sit to stand and ambulated 2 sidesteps alongside EOB using rolling walker with mod assist x 2. Pt appeared confused and unable to provide social info. As per EMR pt resides in a 2 level house with 3-4 steps to enter and 14 steps with +chairlift to access bedroom on 2nd level. Dtr-in-law and son assisted patient with ADL's as needed prior to hospitalization. Pt ambulated using a rolling walker and used a w/c for long distances. Pt attends outpatient dialysis 3x/wk. Pt performed sit to stand and ambulated 2 sidesteps alongside EOB using rolling walker with mod assist x 2.

## 2021-04-16 NOTE — PROGRESS NOTE ADULT - SUBJECTIVE AND OBJECTIVE BOX
[facility]       HPI:  71 yr female with PMHx CAD s/p stents '07, HTN, MI, PAF, liver abscess, s/p biliary stent with removal (11/2018; 7/2/19), chronic pancreatitis, DM2 on insulin, neuropathy, ESRD (5/2018) on HD (M/W/F) who presented this morning (4/12/21) via EMS from home with progressive SOB and found to be in hypoxic resp failure with SPO2 of 83% on Rm Air.      In E.D. pt initially placed on NRB with improvement of SPO2 99% with eventual placement of bipap therapy secondary to increased WOB and hypercapnic resp failure with ph 7.30 PCO2 of 50 serum HCO3 of 26, CXR with bilat opacities. Pt also found to have hyperkalemia of 6.8 without ECG changes, hyperglycemic of 266, WBC of 14.2, BNP 54014. Pt received lokelma 10gms po, Reg insuline 10 units IV x1.        Consult called for hypoxic/hypercapnic resp failure and hyperkalemia secondary to ESRD   (12 Apr 2021 15:40)        SUBJECTIVE:      ALLERGIES:  Allergies    ertapenem (Urticaria)  Purell (Rash)    Intolerances          MEDICATIONS  (STANDING):  albuterol/ipratropium for Nebulization 3 milliLiter(s) Nebulizer every 6 hours  atorvastatin 80 milliGRAM(s) Oral at bedtime  clopidogrel Tablet 75 milliGRAM(s) Oral daily  heparin   Injectable 5000 Unit(s) SubCutaneous every 8 hours  insulin glargine Injectable (LANTUS) 12 Unit(s) SubCutaneous at bedtime  insulin lispro Injectable (ADMELOG) 5 Unit(s) SubCutaneous before breakfast  insulin lispro Injectable (ADMELOG) 5 Unit(s) SubCutaneous before lunch  insulin lispro Injectable (ADMELOG) 5 Unit(s) SubCutaneous before dinner  metoprolol tartrate 25 milliGRAM(s) Oral two times a day    MEDICATIONS  (PRN):  acetaminophen   Tablet .. 650 milliGRAM(s) Oral every 6 hours PRN Mild Pain (1 - 3)  sodium chloride 0.9% Bolus. 100 milliLiter(s) IV Bolus every 5 minutes PRN SBP LESS THAN or EQUAL to 90 mmHg      REVIEW OF SYSTEMS:  CONSTITUTIONAL: No fever,  RESPIRATORY: No cough, wheezing, shortness of breath  CARDIOVASCULAR: No chest pain, dyspnea, palpitations, dizziness, syncope, paroxysmal nocturnal dyspnea, orthopnea, or arm or leg swelling  GASTROINTESTINAL: No abdominal  or epigastric pain, nausea, vomiting,  diarrhea  NEUROLOGICAL: No headaches,  loss of strength, numbness, or tremors    Vital Signs Last 24 Hrs  T(C): 37.6 (16 Apr 2021 19:51), Max: 37.6 (16 Apr 2021 19:51)  T(F): 99.7 (16 Apr 2021 19:51), Max: 99.7 (16 Apr 2021 19:51)  HR: 81 (16 Apr 2021 19:51) (75 - 83)  BP: 140/69 (16 Apr 2021 19:51) (126/67 - 150/78)  BP(mean): --  RR: 18 (16 Apr 2021 19:51) (17 - 18)  SpO2: 98% (16 Apr 2021 19:51) (93% - 98%)    PHYSICAL EXAM:  HEAD:  Atraumatic, Normocephalic  NECK: Supple and normal thyroid.  No JVD or carotid bruit.   HEART: S1, S2 regular , 1/6 soft ejection systolic murmur in mitral area , no thrill and no gallops .  PULMONARY: Bilateral vesicular breathing , few scattered ronchi and few scattered rales are present .  ABDOMEN: Soft nontender and positive bowl sounds   EXTREMITIES:  No clubbing, cyanosis, or pedal  edema  NEUROLOGICAL: AAOX3 , no focal deficit .    LABS:                        10.2   16.61 )-----------( 275      ( 15 Apr 2021 11:49 )             32.2     04-15    135  |  98  |  59<H>  ----------------------------<  170<H>  5.0   |  24  |  7.80<H>    Ca    8.2<L>      15 Apr 2021 11:49  Phos  7.5     04-15    TPro  x   /  Alb  2.6<L>  /  TBili  x   /  DBili  x   /  AST  x   /  ALT  x   /  AlkPhos  x   04-15    CARDIAC MARKERS ( 16 Apr 2021 05:16 )  .164 ng/mL / x     / x     / x     / x              BNP      EKG:  ECHO:  IMAGING:    Assessment/Plan    Will continue to follow during hospital course and give further recommendations as needed . Thanks for your referral . if any questions please contact me at office ([phone] [phone])  YONNY RIVERO MD [facility]  [address] [id number]  [address]  OFFICE : [phone]  CELL : [phone]    CARDIOLOGY F/U :      HPI:  71 yr female with PMHx CAD s/p stents '07, HTN, MI, PAF, liver abscess, s/p biliary stent with removal (11/2018; 7/2/19), chronic pancreatitis, DM2 on insulin, neuropathy, ESRD (5/2018) on HD (M/W/F) who presented this morning (4/12/21) via EMS from home with progressive SOB and found to be in hypoxic resp failure with SPO2 of 83% on Rm Air.      In E.D. pt initially placed on NRB with improvement of SPO2 99% with eventual placement of bipap therapy secondary to increased WOB and hypercapnic resp failure with ph 7.30 PCO2 of 50 serum HCO3 of 26, CXR with bilat opacities. Pt also found to have hyperkalemia of 6.8 without ECG changes, hyperglycemic of 266, WBC of 14.2, BNP 99059. Pt received lokelma 10gms po, Reg insuline 10 units IV x1.        Consult called for hypoxic/hypercapnic resp failure and hyperkalemia secondary to ESRD   (12 Apr 2021 15:40)        SUBJECTIVE: Patient lying comfortable .      ALLERGIES:  Allergies    ertapenem (Urticaria)  Purell (Rash)    Intolerances          MEDICATIONS  (STANDING):  albuterol/ipratropium for Nebulization 3 milliLiter(s) Nebulizer every 6 hours  atorvastatin 80 milliGRAM(s) Oral at bedtime  clopidogrel Tablet 75 milliGRAM(s) Oral daily  heparin   Injectable 5000 Unit(s) SubCutaneous every 8 hours  insulin glargine Injectable (LANTUS) 12 Unit(s) SubCutaneous at bedtime  insulin lispro Injectable (ADMELOG) 5 Unit(s) SubCutaneous before breakfast  insulin lispro Injectable (ADMELOG) 5 Unit(s) SubCutaneous before lunch  insulin lispro Injectable (ADMELOG) 5 Unit(s) SubCutaneous before dinner  metoprolol tartrate 25 milliGRAM(s) Oral two times a day    MEDICATIONS  (PRN):  acetaminophen   Tablet .. 650 milliGRAM(s) Oral every 6 hours PRN Mild Pain (1 - 3)  sodium chloride 0.9% Bolus. 100 milliLiter(s) IV Bolus every 5 minutes PRN SBP LESS THAN or EQUAL to 90 mmHg      REVIEW OF SYSTEMS:  CONSTITUTIONAL: No fever,  RESPIRATORY: Improved  cough, wheezing, shortness of breath  CARDIOVASCULAR: No chest pain, palpitations, dizziness, syncope, paroxysmal nocturnal dyspnea, or arm or leg swelling and improvement in SOB .  GASTROINTESTINAL: No abdominal  or epigastric pain, nausea, vomiting,  diarrhea  NEUROLOGICAL: No headaches,  numbness, or tremors  Skin : No itching .  Hematology : No bleeding .  Endocrinology : No heat and cold intolerance .  Psychiatry : Patient mild confused   Musculoskeletal : patient has mild arthritis .    Vital Signs Last 24 Hrs  T(C): 37.6 (16 Apr 2021 19:51), Max: 37.6 (16 Apr 2021 19:51)  T(F): 99.7 (16 Apr 2021 19:51), Max: 99.7 (16 Apr 2021 19:51)  HR: 81 (16 Apr 2021 19:51) (75 - 83)  BP: 140/69 (16 Apr 2021 19:51) (126/67 - 150/78)  BP(mean): --  RR: 18 (16 Apr 2021 19:51) (17 - 18)  SpO2: 98% (16 Apr 2021 19:51) (93% - 98%)    PHYSICAL EXAM:  HEAD:  Atraumatic, Normocephalic  NECK: Supple and normal thyroid.  No JVD or carotid bruit.   HEART: S1, S2 regular , 1/6 soft ejection systolic murmur in mitral area , no thrill and no gallops .  PULMONARY: Bilateral vesicular breathing , few scattered rhonchi and few scattered rales are present .  ABDOMEN: Soft nontender and positive bowl sounds   EXTREMITIES:  No clubbing, cyanosis, or pedal  edema  NEUROLOGICAL: AA and mild confused .   Skin : No rashes .  Musculoskeletal : No joint swellings .    LABS:                        10.2   16.61 )-----------( 275      ( 15 Apr 2021 11:49 )             32.2     04-15    135  |  98  |  59<H>  ----------------------------<  170<H>  5.0   |  24  |  7.80<H>    Ca    8.2<L>      15 Apr 2021 11:49  Phos  7.5     04-15    TPro  x   /  Alb  2.6<L>  /  TBili  x   /  DBili  x   /  AST  x   /  ALT  x   /  AlkPhos  x   04-15    CARDIAC MARKERS ( 16 Apr 2021 05:16 )  .164 ng/mL / x     / x     / x     / x              Assessment/Plan  Patient has :  1) CHF and stable . Acute on chronic diastolic heart failure .  2) Acute CVA .  3) No significant cardiac arrythmia on monitor . Patient is in NSR .  4) ESRD and on hemodialysis   5) Remote one episode of atrial fibrillation more than 3 years ago and during gall bladder surgery as per notes in computer and no recurrence of episode since than as per notes in computer .   6) Anemia   Plan : 1) Cardiac stable so far 2) Full anticoagulation contra indicated at this time due to risk of intra cranial bleed 3) Continue Plavix 4) Monitor hemoglobin and electrolytes . 5) prognosis guarded . 6) Monitor hemoglobin and electrolytes .  Will continue to follow during hospital course and give further recommendations as needed . Thanks for your referral . if any questions please contact me at office ([phone])

## 2021-04-16 NOTE — PROGRESS NOTE ADULT - SUBJECTIVE AND OBJECTIVE BOX
Neurology follow up note    IRENE [id number]      Interval History:    Patient awake bed     MEDICATIONS    acetaminophen   Tablet .. 650 milliGRAM(s) Oral every 6 hours PRN  albuterol/ipratropium for Nebulization 3 milliLiter(s) Nebulizer every 6 hours  atorvastatin 80 milliGRAM(s) Oral at bedtime  clopidogrel Tablet 75 milliGRAM(s) Oral daily  heparin   Injectable 5000 Unit(s) SubCutaneous every 8 hours  insulin glargine Injectable (LANTUS) 12 Unit(s) SubCutaneous at bedtime  insulin lispro Injectable (ADMELOG) 5 Unit(s) SubCutaneous before breakfast  insulin lispro Injectable (ADMELOG) 5 Unit(s) SubCutaneous before lunch  insulin lispro Injectable (ADMELOG) 5 Unit(s) SubCutaneous before dinner  metoprolol tartrate 25 milliGRAM(s) Oral two times a day  sodium chloride 0.9% Bolus. 100 milliLiter(s) IV Bolus every 5 minutes PRN      Allergies    ertapenem (Urticaria)  Purell (Rash)    Intolerances            Vital Signs Last 24 Hrs  T(C): 37.1 (16 Apr 2021 08:32), Max: 37.1 (15 Apr 2021 19:15)  T(F): 98.7 (16 Apr 2021 08:32), Max: 98.7 (15 Apr 2021 19:15)  HR: 75 (16 Apr 2021 08:32) (75 - 96)  BP: 150/78 (16 Apr 2021 08:32) (100/56 - 150/78)  BP(mean): --  RR: 18 (16 Apr 2021 08:32) (16 - 18)  SpO2: 96% (16 Apr 2021 08:32) (93% - 100%)      REVIEW OF SYSTEMS:  Very limited but feels ok no significant numbness in feet     PHYSICAL EXAMINATION:  .  HEENT:  Head:  Normocephalic, atraumatic.  Eyes:  No scleral icterus.  Ears:  Hearing appeared to be intact.  NECK:  Supple.  CARDIOVASCULAR:  S1 and S2 heard.  RESPIRATORY:  Good air entry bilaterally.  ABDOMEN:  Soft, nontender.  EXTREMITIES:  No clubbing or cyanosis were noted.      NEUROLOGIC:  The patient is awake and alert.  Location was home  Able to say daughter's name.  Year and month was unknown.  was able to tell number of finger in each hand   Extraocular movements were intact.  Speech was fluent.  Motor:  Bilateral upper 4/5.  Bilateral lower extremities with plantar stimulation, slight flexation at the hip and knee, would say overall 3-/5.  The patient would not follow complex or simple commands.  patient would become anger easily yelling not to be touched                  LABS:  CBC Full  -  ( 15 Apr 2021 11:49 )  WBC Count : 16.61 K/uL  RBC Count : 3.51 M/uL  Hemoglobin : 10.2 g/dL  Hematocrit : 32.2 %  Platelet Count - Automated : 275 K/uL  Mean Cell Volume : 91.7 fl  Mean Cell Hemoglobin : 29.1 pg  Mean Cell Hemoglobin Concentration : 31.7 gm/dL  Auto Neutrophil # : x  Auto Lymphocyte # : x  Auto Monocyte # : x  Auto Eosinophil # : x  Auto Basophil # : x  Auto Neutrophil % : x  Auto Lymphocyte % : x  Auto Monocyte % : x  Auto Eosinophil % : x  Auto Basophil % : x      04-15    135  |  98  |  59<H>  ----------------------------<  170<H>  5.0   |  24  |  7.80<H>    Ca    8.2<L>      15 Apr 2021 11:49  Phos  7.5     04-15    TPro  x   /  Alb  2.6<L>  /  TBili  x   /  DBili  x   /  AST  x   /  ALT  x   /  AlkPhos  x   04-15    Hemoglobin A1C:     LIVER FUNCTIONS - ( 15 Apr 2021 11:49 )  Alb: 2.6 g/dL / Pro: x     / ALK PHOS: x     / ALT: x     / AST: x     / GGT: x           Vitamin B12         RADIOLOGY      ANALYSIS AND PLAN:  This is a 71-year-old with episode of altered mental status.    For episode of altered mental status, suspect most likely this is metabolic in nature, questionable secondary to any type of hospital induced delirium psychosis  mental status is better    MRI imaging of the brain noted   For history of diabetes, strict control of blood sugars.  For history of neuropathy, the patient does have elevated renal function.  I would recommend Nephrology followup in regards to possibly adjustment dose of the patient's gabapentin decreased to 100 tid  For history of hypertension, monitor systolic blood pressure.  ct chest noted antibiotics as needed   For paroxysmal atrial fibrillation, telemetry evaluation.  Cardiology followup as needed  for CVA will dc asa change to plavix  if patient  does have   AFIB would recommends dc plavix and start AC   follow up echo     Spoke with daughter, Frederick; her telephone number is [phone] 4/15/2021 use her to translate and ask mom questions       Greater than 45 minutes of time was spent with the patient, plan of care, reviewing data, with greater than 50% of the visit was spent counseling and/or coordinating care with multidisciplinary healthcare team   Neurology follow up note    IRENE [id number]      Interval History:    Patient awake bed     MEDICATIONS    acetaminophen   Tablet .. 650 milliGRAM(s) Oral every 6 hours PRN  albuterol/ipratropium for Nebulization 3 milliLiter(s) Nebulizer every 6 hours  atorvastatin 80 milliGRAM(s) Oral at bedtime  clopidogrel Tablet 75 milliGRAM(s) Oral daily  heparin   Injectable 5000 Unit(s) SubCutaneous every 8 hours  insulin glargine Injectable (LANTUS) 12 Unit(s) SubCutaneous at bedtime  insulin lispro Injectable (ADMELOG) 5 Unit(s) SubCutaneous before breakfast  insulin lispro Injectable (ADMELOG) 5 Unit(s) SubCutaneous before lunch  insulin lispro Injectable (ADMELOG) 5 Unit(s) SubCutaneous before dinner  metoprolol tartrate 25 milliGRAM(s) Oral two times a day  sodium chloride 0.9% Bolus. 100 milliLiter(s) IV Bolus every 5 minutes PRN      Allergies    ertapenem (Urticaria)  Purell (Rash)    Intolerances            Vital Signs Last 24 Hrs  T(C): 37.1 (16 Apr 2021 08:32), Max: 37.1 (15 Apr 2021 19:15)  T(F): 98.7 (16 Apr 2021 08:32), Max: 98.7 (15 Apr 2021 19:15)  HR: 75 (16 Apr 2021 08:32) (75 - 96)  BP: 150/78 (16 Apr 2021 08:32) (100/56 - 150/78)  BP(mean): --  RR: 18 (16 Apr 2021 08:32) (16 - 18)  SpO2: 96% (16 Apr 2021 08:32) (93% - 100%)      REVIEW OF SYSTEMS:  Very limited but feels ok no significant numbness in feet     PHYSICAL EXAMINATION:  .  HEENT:  Head:  Normocephalic, atraumatic.  Eyes:  No scleral icterus.  Ears:  Hearing appeared to be intact.  NECK:  Supple.  CARDIOVASCULAR:  S1 and S2 heard.  RESPIRATORY:  Good air entry bilaterally.  ABDOMEN:  Soft, nontender.  EXTREMITIES:  No clubbing or cyanosis were noted.      NEUROLOGIC:  The patient is awake and alert.  Location was home  Able to say daughter's name.  Year and month was unknown.  was able to tell number of finger in each hand   Extraocular movements were intact.  Speech was fluent.  Motor:  Bilateral upper 4/5.  Bilateral lower extremities with plantar stimulation, slight flexation at the hip and knee, would say overall 3-/5.  The patient would not follow complex or simple commands.  patient would become anger easily yelling not to be touched                  LABS:  CBC Full  -  ( 15 Apr 2021 11:49 )  WBC Count : 16.61 K/uL  RBC Count : 3.51 M/uL  Hemoglobin : 10.2 g/dL  Hematocrit : 32.2 %  Platelet Count - Automated : 275 K/uL  Mean Cell Volume : 91.7 fl  Mean Cell Hemoglobin : 29.1 pg  Mean Cell Hemoglobin Concentration : 31.7 gm/dL  Auto Neutrophil # : x  Auto Lymphocyte # : x  Auto Monocyte # : x  Auto Eosinophil # : x  Auto Basophil # : x  Auto Neutrophil % : x  Auto Lymphocyte % : x  Auto Monocyte % : x  Auto Eosinophil % : x  Auto Basophil % : x      04-15    135  |  98  |  59<H>  ----------------------------<  170<H>  5.0   |  24  |  7.80<H>    Ca    8.2<L>      15 Apr 2021 11:49  Phos  7.5     04-15    TPro  x   /  Alb  2.6<L>  /  TBili  x   /  DBili  x   /  AST  x   /  ALT  x   /  AlkPhos  x   04-15    Hemoglobin A1C:     LIVER FUNCTIONS - ( 15 Apr 2021 11:49 )  Alb: 2.6 g/dL / Pro: x     / ALK PHOS: x     / ALT: x     / AST: x     / GGT: x           Vitamin B12         RADIOLOGY      ANALYSIS AND PLAN:  This is a 71-year-old with episode of altered mental status.    For episode of altered mental status, suspect most likely this is metabolic in nature, questionable secondary to any type of hospital induced delirium psychosis  mental status is better    MRI imaging of the brain noted   For history of diabetes, strict control of blood sugars.  For history of neuropathy, the patient does have elevated renal function.  I would recommend Nephrology followup in regards to possibly adjustment dose of the patient's gabapentin decreased to 100 tid  For history of hypertension, monitor systolic blood pressure.  ct chest noted antibiotics as needed   For paroxysmal atrial fibrillation, telemetry evaluation.  Cardiology followup as needed  for CVA will dc asa change to plavix  if patient  does have  AFIB would recommends dc plavix and start AC   echo No intracardiac mass,thrombus or vegetations and  tumor.    Spoke with daughter, Frederick; her telephone number is [phone] 4/16/2021 use her to translate and ask mom questions as per did talk to her "perfectly "      Greater than 45 minutes of time was spent with the patient, plan of care, reviewing data, with greater than 50% of the visit was spent counseling and/or coordinating care with multidisciplinary healthcare team

## 2021-04-17 LAB
CULTURE RESULTS: SIGNIFICANT CHANGE UP
CULTURE RESULTS: SIGNIFICANT CHANGE UP
HCT VFR BLD CALC: 34 % — LOW (ref 34.5–45)
HGB BLD-MCNC: 10.8 G/DL — LOW (ref 11.5–15.5)
MCHC RBC-ENTMCNC: 28.7 PG — SIGNIFICANT CHANGE UP (ref 27–34)
MCHC RBC-ENTMCNC: 31.8 GM/DL — LOW (ref 32–36)
MCV RBC AUTO: 90.4 FL — SIGNIFICANT CHANGE UP (ref 80–100)
NRBC # BLD: 0 /100 WBCS — SIGNIFICANT CHANGE UP (ref 0–0)
PLATELET # BLD AUTO: 315 K/UL — SIGNIFICANT CHANGE UP (ref 150–400)
RBC # BLD: 3.76 M/UL — LOW (ref 3.8–5.2)
RBC # FLD: 13.8 % — SIGNIFICANT CHANGE UP (ref 10.3–14.5)
SPECIMEN SOURCE: SIGNIFICANT CHANGE UP
SPECIMEN SOURCE: SIGNIFICANT CHANGE UP
TROPONIN I SERPL-MCNC: 0.16 NG/ML — HIGH (ref 0.01–0.04)
WBC # BLD: 14.27 K/UL — HIGH (ref 3.8–10.5)
WBC # FLD AUTO: 14.27 K/UL — HIGH (ref 3.8–10.5)

## 2021-04-17 RX ORDER — CEFTRIAXONE 500 MG/1
1000 INJECTION, POWDER, FOR SOLUTION INTRAMUSCULAR; INTRAVENOUS EVERY 24 HOURS
Refills: 0 | Status: DISCONTINUED | OUTPATIENT
Start: 2021-04-17 | End: 2021-04-20

## 2021-04-17 RX ORDER — HALOPERIDOL DECANOATE 100 MG/ML
1 INJECTION INTRAMUSCULAR EVERY 8 HOURS
Refills: 0 | Status: DISCONTINUED | OUTPATIENT
Start: 2021-04-17 | End: 2021-04-23

## 2021-04-17 RX ORDER — HALOPERIDOL DECANOATE 100 MG/ML
1 INJECTION INTRAMUSCULAR ONCE
Refills: 0 | Status: COMPLETED | OUTPATIENT
Start: 2021-04-17 | End: 2021-04-17

## 2021-04-17 RX ORDER — SODIUM ZIRCONIUM CYCLOSILICATE 10 G/10G
10 POWDER, FOR SUSPENSION ORAL EVERY 8 HOURS
Refills: 0 | Status: COMPLETED | OUTPATIENT
Start: 2021-04-17 | End: 2021-04-19

## 2021-04-17 RX ADMIN — Medication 25 MILLIGRAM(S): at 17:32

## 2021-04-17 RX ADMIN — HALOPERIDOL DECANOATE 1 MILLIGRAM(S): 100 INJECTION INTRAMUSCULAR at 14:28

## 2021-04-17 RX ADMIN — Medication 1 MILLIGRAM(S): at 15:42

## 2021-04-17 RX ADMIN — CEFTRIAXONE 100 MILLIGRAM(S): 500 INJECTION, POWDER, FOR SOLUTION INTRAMUSCULAR; INTRAVENOUS at 16:19

## 2021-04-17 RX ADMIN — Medication 25 MILLIGRAM(S): at 05:17

## 2021-04-17 RX ADMIN — Medication 5 UNIT(S): at 09:03

## 2021-04-17 RX ADMIN — HEPARIN SODIUM 5000 UNIT(S): 5000 INJECTION INTRAVENOUS; SUBCUTANEOUS at 05:17

## 2021-04-17 RX ADMIN — INSULIN GLARGINE 12 UNIT(S): 100 INJECTION, SOLUTION SUBCUTANEOUS at 21:58

## 2021-04-17 RX ADMIN — Medication 3 MILLILITER(S): at 08:21

## 2021-04-17 RX ADMIN — SODIUM ZIRCONIUM CYCLOSILICATE 10 GRAM(S): 10 POWDER, FOR SUSPENSION ORAL at 17:32

## 2021-04-17 RX ADMIN — HEPARIN SODIUM 5000 UNIT(S): 5000 INJECTION INTRAVENOUS; SUBCUTANEOUS at 21:59

## 2021-04-17 NOTE — CONSULT NOTE ADULT - ASSESSMENT
71 yr female with PMHx CAD s/p stents '07, HTN, MI, PAF, liver abscess, s/p biliary stent with removal (11/2018; 7/2/19), chronic pancreatitis, DM2 on insulin, neuropathy, ESRD (5/2018) on HD (M/W/F) who presented (4/12/21) adm with hypoxic renal failure    Leukocytosis  despite abn lung imaging pt appears clinically stable with no clear indication of airspace disease, elevation is neutrophilia without clearing of eosinophils, recommend short course fo empiric CEFTRIAXONE for possible PNA and will follow response to therapy    PNA  as above, will follow dailyr CBC w diff    Thank you for consulting us and involving us in the management of this most interesting and challenging case.  We will follow along in the care of this patient. Please call us at [phone] or text me directly on my cell# at [phone] with any concerns.

## 2021-04-17 NOTE — PROGRESS NOTE ADULT - SUBJECTIVE AND OBJECTIVE BOX
Neurology follow up note    IRENE [id number]      Interval History:    Patient awake in bed     MEDICATIONS    acetaminophen   Tablet .. 650 milliGRAM(s) Oral every 6 hours PRN  albuterol/ipratropium for Nebulization 3 milliLiter(s) Nebulizer every 6 hours  atorvastatin 80 milliGRAM(s) Oral at bedtime  clopidogrel Tablet 75 milliGRAM(s) Oral daily  heparin   Injectable 5000 Unit(s) SubCutaneous every 8 hours  insulin glargine Injectable (LANTUS) 12 Unit(s) SubCutaneous at bedtime  insulin lispro Injectable (ADMELOG) 5 Unit(s) SubCutaneous before breakfast  insulin lispro Injectable (ADMELOG) 5 Unit(s) SubCutaneous before lunch  insulin lispro Injectable (ADMELOG) 5 Unit(s) SubCutaneous before dinner  metoprolol tartrate 25 milliGRAM(s) Oral two times a day  sodium chloride 0.9% Bolus. 100 milliLiter(s) IV Bolus every 5 minutes PRN      Allergies    ertapenem (Urticaria)  Purell (Rash)    Intolerances            Vital Signs Last 24 Hrs  T(C): 37.2 (17 Apr 2021 04:34), Max: 37.6 (16 Apr 2021 19:51)  T(F): 99 (17 Apr 2021 04:34), Max: 99.7 (16 Apr 2021 19:51)  HR: 79 (17 Apr 2021 08:22) (79 - 87)  BP: 173/75 (17 Apr 2021 04:34) (138/56 - 173/75)  BP(mean): --  RR: 17 (17 Apr 2021 04:34) (17 - 18)  SpO2: 95% (17 Apr 2021 08:22) (91% - 98%)      REVIEW OF SYSTEMS:  Very limited but feels ok no significant numbness in feet     PHYSICAL EXAMINATION:  .  HEENT:  Head:  Normocephalic, atraumatic.  Eyes:  No scleral icterus.  Ears:  Hearing appeared to be intact.  NECK:  Supple.  CARDIOVASCULAR:  S1 and S2 heard.  RESPIRATORY:  Good air entry bilaterally.  ABDOMEN:  Soft, nontender.  EXTREMITIES:  No clubbing or cyanosis were noted.      NEUROLOGIC:  The patient is awake and alert.  Location was home  Able to say daughter's name.  Year and month was unknown.  was able to tell number of finger in each hand   Extraocular movements were intact.  Speech was fluent.  Motor:  Bilateral upper 4/5.  Bilateral lower extremities with plantar stimulation, slight flexation at the hip and knee, would say overall 3-/5.  The patient would not follow complex or simple commands.                LABS:  CBC Full  -  ( 17 Apr 2021 08:24 )  WBC Count : 14.27 K/uL  RBC Count : 3.76 M/uL  Hemoglobin : 10.8 g/dL  Hematocrit : 34.0 %  Platelet Count - Automated : 315 K/uL  Mean Cell Volume : 90.4 fl  Mean Cell Hemoglobin : 28.7 pg  Mean Cell Hemoglobin Concentration : 31.8 gm/dL  Auto Neutrophil # : x  Auto Lymphocyte # : x  Auto Monocyte # : x  Auto Eosinophil # : x  Auto Basophil # : x  Auto Neutrophil % : x  Auto Lymphocyte % : x  Auto Monocyte % : x  Auto Eosinophil % : x  Auto Basophil % : x      04-17    135  |  98  |  56<H>  ----------------------------<  85  5.6<H>   |  25  |  7.50<H>    Ca    9.2      17 Apr 2021 08:24  Phos  7.4     04-17    TPro  x   /  Alb  2.6<L>  /  TBili  x   /  DBili  x   /  AST  x   /  ALT  x   /  AlkPhos  x   04-17    Hemoglobin A1C:     LIVER FUNCTIONS - ( 17 Apr 2021 08:24 )  Alb: 2.6 g/dL / Pro: x     / ALK PHOS: x     / ALT: x     / AST: x     / GGT: x           Vitamin B12         RADIOLOGY      ANALYSIS AND PLAN:  This is a 71-year-old with episode of altered mental status.    For episode of altered mental status, suspect most likely this is metabolic in nature, questionable secondary to any type of hospital induced delirium psychosis  mental status is better    MRI imaging of the brain noted   For history of diabetes, strict control of blood sugars.  For history of neuropathy, the patient does have elevated renal function.  I would recommend Nephrology followup in regards to possibly adjustment dose of the patient's gabapentin decreased to 100 tid  For history of hypertension, monitor systolic blood pressure.  ct chest noted antibiotics as needed   For paroxysmal atrial fibrillation, telemetry evaluation.  Cardiology followup as needed  for CVA will dc asa change to plavix  if patient  does have  AFIB would recommends dc plavix and start AC after 2 weeks total  do to size of CVA  echo No intracardiac mass,thrombus or vegetations and  tumor.    Spoke with daughter, Frederick; her telephone number is [phone] 4/17/2021 use her to translate and ask mom questions as per did talk to her "perfectly "      Greater than 45 minutes of time was spent with the patient, plan of care, reviewing data, with greater than 50% of the visit was spent counseling and/or coordinating care with multidisciplinary healthcare team

## 2021-04-17 NOTE — CONSULT NOTE ADULT - SUBJECTIVE AND OBJECTIVE BOX
[facility] DIVISION of INFECTIOUS DISEASE  Nate Gonzáles MD PhD, Daria Bob MD, Anna Zamora MD, Arjun Mora MD  and providing coverage with Sonya Estes MD and Norah Ruiz MD  Providing Infectious Disease Consultations at [facility], [facility], [facility]'s    Office# [phone] to schedule follow up appointments  Answering Service for urgent calls or New Consults [phone]  Cell# to text for urgent issues Nate Gonzáles [phone]     HPI:  71 yr female with PMHx CAD s/p stents '07, HTN, MI, PAF, liver abscess, s/p biliary stent with removal (11/2018; 7/2/19), chronic pancreatitis, DM2 on insulin, neuropathy, ESRD (5/2018) on HD (M/W/F) who presented(4/12/21) via EMS from home with progressive SOB and found to be in hypoxic resp failure with SPO2 of 83% on Rm Air. In E.D. pt initially placed on NRB with improvement of SPO2 99% with eventual placement of bipap therapy secondary to increased WOB and hypercapnic resp failure with ph 7.30 PCO2 of 50 serum HCO3 of 26, CXR with bilat opacities. Pt also found to have hyperkalemia of 6.8 without ECG changes, hyperglycemic of 266, WBC of 14.2, BNP 33097. Pt received lokelma 10gms po, Reg insuline 10 units IV x1.  Consult called for hypoxic/hypercapnic resp failure and hyperkalemia secondary to ESRD      PAST MEDICAL & SURGICAL HISTORY:  Hypertension  Myocardial infarction 2007  Diabetes Type 2 DM  CAD (coronary artery disease)  s/p stent x 1 in 2007  ESRD (end stage renal disease) on dialysis MWF since 05/2018  Liver abscess resolved  Acute urinary retention  Cholecystitis with cholangitis  Chronic pancreatitis  PAF (paroxysmal atrial fibrillation)  1 episode in 11/2018 while hospitalized for acute cholecystitis    H/O diabetic neuropathy    H/O: hysterectomy  1990    History of biliary stent insertion  11/2018 &amp; removal 7/2/19    S/P arteriovenous (AV) fistula creation  05/17/2019    Cataract  IOL b/l        Antimicrobials      Immunological      Other  acetaminophen   Tablet .. 650 milliGRAM(s) Oral every 6 hours PRN  albuterol/ipratropium for Nebulization 3 milliLiter(s) Nebulizer every 6 hours  atorvastatin 80 milliGRAM(s) Oral at bedtime  clopidogrel Tablet 75 milliGRAM(s) Oral daily  heparin   Injectable 5000 Unit(s) SubCutaneous every 8 hours  insulin glargine Injectable (LANTUS) 12 Unit(s) SubCutaneous at bedtime  insulin lispro Injectable (ADMELOG) 5 Unit(s) SubCutaneous before breakfast  insulin lispro Injectable (ADMELOG) 5 Unit(s) SubCutaneous before lunch  insulin lispro Injectable (ADMELOG) 5 Unit(s) SubCutaneous before dinner  metoprolol tartrate 25 milliGRAM(s) Oral two times a day  sodium chloride 0.9% Bolus. 100 milliLiter(s) IV Bolus every 5 minutes PRN      Allergies  ertapenem (Urticaria)  Purell (Rash)    Intolerances      SOCIAL HISTORY:  no toxic habits reported      FAMILY HISTORY:  No pertinent family history in first degree relatives        ROS:  limited    Vital Signs Last 24 Hrs  T(C): 37.2 (17 Apr 2021 04:34), Max: 37.6 (16 Apr 2021 19:51)  T(F): 99 (17 Apr 2021 04:34), Max: 99.7 (16 Apr 2021 19:51)  HR: 79 (17 Apr 2021 08:22) (79 - 87)  BP: 173/75 (17 Apr 2021 04:34) (138/56 - 173/75)  BP(mean): --  RR: 17 (17 Apr 2021 04:34) (17 - 18)  SpO2: 95% (17 Apr 2021 08:22) (91% - 98%)    PE:  WDWN in no distress  HEENT:  NC, PERRL, sclerae anicteric, conjunctivae clear, EOMI.  Sinuses nontender, no nasal exudate.  No buccal or pharyngeal lesions, erythema or exudate  Neck:  Supple, no adenopathy  Lungs:  No accessory muscle use, bilaterally clear to auscultation  Cor:  RRR, S1, S2, no murmur appreciated  Abd:  Symmetric, normoactive BS.  Soft, nontender, no masses, guarding or rebound.  Liver and spleen not enlarged  Extrem:  No cyanosis or edema  Skin:  No rashes.  Neuro: grossly intact  Musc: moving all limbs freely, no focal deficits        LABS:                        10.8   14.27 )-----------( 315      ( 17 Apr 2021 08:24 )             34.0       WBC Count: 14.27 K/uL (04-17-21 @ 08:24)  WBC Count: 16.61 K/uL (04-15-21 @ 11:49)  WBC Count: 13.08 K/uL (04-14-21 @ 09:23)  WBC Count: 14.41 K/uL (04-13-21 @ 07:09)  WBC Count: 13.58 K/uL (04-12-21 @ 18:08)  WBC Count: 14.16 K/uL (04-12-21 @ 09:59)      04-17    135  |  98  |  56<H>  ----------------------------<  85  5.6<H>   |  25  |  7.50<H>    Ca    9.2      17 Apr 2021 08:24  Phos  7.4     04-17    TPro  x   /  Alb  2.6<L>  /  TBili  x   /  DBili  x   /  AST  x   /  ALT  x   /  AlkPhos  x   04-17      Creatinine, Serum: 7.50 mg/dL (04-17-21 @ 08:24)  Creatinine, Serum: 7.80 mg/dL (04-15-21 @ 11:49)  Creatinine, Serum: 7.80 mg/dL (04-15-21 @ 11:49)  Creatinine, Serum: 6.30 mg/dL (04-14-21 @ 08:13)  Creatinine, Serum: 4.80 mg/dL (04-13-21 @ 07:09)  Creatinine, Serum: 2.40 mg/dL (04-12-21 @ 18:29)  Creatinine, Serum: 6.70 mg/dL (04-12-21 @ 09:59)      MICROBIOLOGY:      RADIOLOGY & ADDITIONAL STUDIES:    --< from: CT Chest No Cont (04.13.21 @ 21:30) >    EXAM:  CT CHEST                            PROCEDURE DATE:  04/13/2021          INTERPRETATION:  [facility] PRELIMINARY REPORT    PROCEDURE INFORMATION:  Exam: CT Chest Without Contrast; Diagnostic  Exam date and time: 4/13/2021 9:00 PM  Age:71 years old  Clinical indication: Shortness of breath    TECHNIQUE:  Imaging protocol: Diagnostic computed tomography of the chest without contrast.    COMPARISON:  No relevant prior studies available.    FINDINGS:  Lungs: A right basal consolidation, not excluding pneumonia, pneumonitis ,  aspiration or other etiology. A mild patchy and ground-glass opacities of the  lungs bilaterally.  Pleural spaces: A small right pleural effusion.  Heart: Coronary artery disease, a mild cardiac prominence.  Mediastinal space: A small sliding hiatal hernia.  Aorta: A mild-to-moderate aortic atherosclerosis.  Lymph nodes: Unremarkable. No enlarged lymph nodes.    Bones/joints: A diffuse osteopenia. A degenerate the spine, no severe spinal  canal narrowing.A mild chronic vertebral compressions.  Soft tissues: Unremarkable.    IMPRESSION:  1. A small right pleural effusion.  2. A right basal consolidation, not excluding pneumonia, pneumonitis ,  aspiration or other etiology.  3. A mild patchy and ground-glass opacities of the lungs bilaterally.      See final report from CT scan of the chest 4/13/2021 below:    CLINICAL INFORMATION:  Shortness of breath. Leukocytosis.    PROCEDURE:  Using multislice helical CT, 2.5 mm sections were obtained from thethoracic inlet through the lung bases.  Multiplanar reformatted images.    COMPARISON: CT scan chest 1/2/2019.    FINDINGS:    There is a right basilar airspace consolidation, and adjacent trace pleural effusion at the posterior costophrenic angle.    There is mild bilateral groundglass mosaic attenuation, similar to prior exam.  There is narrowing of the trachea and main central bronchi, which are patent.    There are small subcentimeter prevascular, pretracheal mediastinal lymph nodes, stable.  The evaluation of the pulmonary hilum is limited without intravenous contrast.    The heart is enlarged.  No pericardial effusion.  Atherosclerotic calcification of thoracic aorta with coronary artery calcifications.    There are degenerative changesof the thoracic spine, with age indeterminate compression deformity of the T6 vertebral body.    IMPRESSION:    Right lower lobe airspace consolidation may reflect pneumonia and/or atelectasis in the appropriate clinical setting.  Recommend follow-upchest radiograph after the appropriate clinical course of treatment.    Mild groundglass mosaic attenuation, similar to prior exam, which may reflect small airway or small vessel disease.    Other findings as discussed above.    This study was preliminary reported by [facility].

## 2021-04-17 NOTE — PROGRESS NOTE ADULT - SUBJECTIVE AND OBJECTIVE BOX
CAPILLARY BLOOD GLUCOSE      POCT Blood Glucose.: 107 mg/dL (17 Apr 2021 08:00)  POCT Blood Glucose.: 121 mg/dL (16 Apr 2021 21:23)  POCT Blood Glucose.: 191 mg/dL (16 Apr 2021 16:59)  POCT Blood Glucose.: 120 mg/dL (16 Apr 2021 11:47)      Vital Signs Last 24 Hrs  T(C): 37.2 (17 Apr 2021 04:34), Max: 37.6 (16 Apr 2021 19:51)  T(F): 99 (17 Apr 2021 04:34), Max: 99.7 (16 Apr 2021 19:51)  HR: 79 (17 Apr 2021 08:22) (79 - 87)  BP: 173/75 (17 Apr 2021 04:34) (138/56 - 173/75)  BP(mean): --  RR: 17 (17 Apr 2021 04:34) (17 - 18)  SpO2: 95% (17 Apr 2021 08:22) (91% - 98%)    General: WN/WD NAD  Respiratory: CTA B/L  CV: RRR, S1S2, no murmurs, rubs or gallops  Abdominal: Soft, NT, ND +BS, Last BM  Extremities: No edema, + peripheral pulses     04-15    135  |  98  |  59<H>  ----------------------------<  170<H>  5.0   |  24  |  7.80<H>    Ca    8.2<L>      15 Apr 2021 11:49  Phos  7.5     04-15    TPro  x   /  Alb  2.6<L>  /  TBili  x   /  DBili  x   /  AST  x   /  ALT  x   /  AlkPhos  x   04-15      atorvastatin 80 milliGRAM(s) Oral at bedtime  insulin glargine Injectable (LANTUS) 12 Unit(s) SubCutaneous at bedtime  insulin lispro Injectable (ADMELOG) 5 Unit(s) SubCutaneous before breakfast  insulin lispro Injectable (ADMELOG) 5 Unit(s) SubCutaneous before lunch  insulin lispro Injectable (ADMELOG) 5 Unit(s) SubCutaneous before dinner

## 2021-04-17 NOTE — PROGRESS NOTE ADULT - SUBJECTIVE AND OBJECTIVE BOX
[facility] NEPHROLOGY SERVICES, [facility]  NEPHROLOGY AND HYPERTENSION  [address]  [address]  [place] [address]  [phone]    MD FLYNN MOLINA MD ANDREY GONCHARUK, MD MADHU KORRAPATI, MD YELENA ROSENBERG, MD BINNY KOSHY, MD CHRISTOPHER CAPUTO, MD JD ENGEL MD          Patient events noted  Agitated; av access clotted  Unable to insert karo due to patient non adherence       MEDICATIONS  (STANDING):  albuterol/ipratropium for Nebulization 3 milliLiter(s) Nebulizer every 6 hours  atorvastatin 80 milliGRAM(s) Oral at bedtime  cefTRIAXone   IVPB 1000 milliGRAM(s) IV Intermittent every 24 hours  clopidogrel Tablet 75 milliGRAM(s) Oral daily  heparin   Injectable 5000 Unit(s) SubCutaneous every 8 hours  insulin glargine Injectable (LANTUS) 12 Unit(s) SubCutaneous at bedtime  insulin lispro Injectable (ADMELOG) 5 Unit(s) SubCutaneous before breakfast  insulin lispro Injectable (ADMELOG) 5 Unit(s) SubCutaneous before lunch  insulin lispro Injectable (ADMELOG) 5 Unit(s) SubCutaneous before dinner  metoprolol tartrate 25 milliGRAM(s) Oral two times a day  sodium zirconium cyclosilicate 10 Gram(s) Oral every 8 hours    MEDICATIONS  (PRN):  acetaminophen   Tablet .. 650 milliGRAM(s) Oral every 6 hours PRN Mild Pain (1 - 3)  sodium chloride 0.9% Bolus. 100 milliLiter(s) IV Bolus every 5 minutes PRN SBP LESS THAN or EQUAL to 90 mmHg      PHYSICAL EXAM:      T(C): 37.2 (04-17-21 @ 04:34), Max: 37.6 (04-16-21 @ 19:51)  HR: 79 (04-17-21 @ 08:22) (79 - 87)  BP: 173/75 (04-17-21 @ 04:34) (138/56 - 173/75)  RR: 17 (04-17-21 @ 04:34) (17 - 18)  SpO2: 95% (04-17-21 @ 08:22) (91% - 98%)  Wt(kg): --  Lungs clear  Heart S1S2  Abd soft NT ND  Extremities:   tr edema                                    10.8   14.27 )-----------( 315      ( 17 Apr 2021 08:24 )             34.0     04-17    135  |  98  |  56<H>  ----------------------------<  85  5.6<H>   |  25  |  7.50<H>    Ca    9.2      17 Apr 2021 08:24  Phos  7.4     04-17    TPro  x   /  Alb  2.6<L>  /  TBili  x   /  DBili  x   /  AST  x   /  ALT  x   /  AlkPhos  x   04-17      LIVER FUNCTIONS - ( 17 Apr 2021 08:24 )  Alb: 2.6 g/dL / Pro: x     / ALK PHOS: x     / ALT: x     / AST: x     / GGT: x           Creatinine Trend: 7.50<--, 7.80<--, 6.30<--, 4.80<--, 2.40<--, 6.70<--      Assessment   ESRD, clotted access  Unable to place karo catheter     Plan:  Will medical rx for now  Lokelma 10g q 8  Attempt karo in 24 hrs    Jhonatan Fitch MD [facility] NEPHROLOGY SERVICES, [facility]  NEPHROLOGY AND HYPERTENSION  [address]  [address]  [place] [address]  [phone]    MD FLYNN MOLINA MD ANDREY GONCHARUK, MD MADHU KORRAPATI, MD YELENA ROSENBERG, MD BINNY KOSHY, MD CHRISTOPHER CAPUTO, MD JD ENGEL MD          Patient events noted  Agitated; av access weak thrill   Unable to insert karo due to patient non adherence       MEDICATIONS  (STANDING):  albuterol/ipratropium for Nebulization 3 milliLiter(s) Nebulizer every 6 hours  atorvastatin 80 milliGRAM(s) Oral at bedtime  cefTRIAXone   IVPB 1000 milliGRAM(s) IV Intermittent every 24 hours  clopidogrel Tablet 75 milliGRAM(s) Oral daily  heparin   Injectable 5000 Unit(s) SubCutaneous every 8 hours  insulin glargine Injectable (LANTUS) 12 Unit(s) SubCutaneous at bedtime  insulin lispro Injectable (ADMELOG) 5 Unit(s) SubCutaneous before breakfast  insulin lispro Injectable (ADMELOG) 5 Unit(s) SubCutaneous before lunch  insulin lispro Injectable (ADMELOG) 5 Unit(s) SubCutaneous before dinner  metoprolol tartrate 25 milliGRAM(s) Oral two times a day  sodium zirconium cyclosilicate 10 Gram(s) Oral every 8 hours    MEDICATIONS  (PRN):  acetaminophen   Tablet .. 650 milliGRAM(s) Oral every 6 hours PRN Mild Pain (1 - 3)  sodium chloride 0.9% Bolus. 100 milliLiter(s) IV Bolus every 5 minutes PRN SBP LESS THAN or EQUAL to 90 mmHg      PHYSICAL EXAM:      T(C): 37.2 (04-17-21 @ 04:34), Max: 37.6 (04-16-21 @ 19:51)  HR: 79 (04-17-21 @ 08:22) (79 - 87)  BP: 173/75 (04-17-21 @ 04:34) (138/56 - 173/75)  RR: 17 (04-17-21 @ 04:34) (17 - 18)  SpO2: 95% (04-17-21 @ 08:22) (91% - 98%)  Wt(kg): --  Lungs clear  Heart S1S2  Abd soft NT ND  Extremities:   tr edema AVG LUE decreased thrill                                     10.8   14.27 )-----------( 315      ( 17 Apr 2021 08:24 )             34.0     04-17    135  |  98  |  56<H>  ----------------------------<  85  5.6<H>   |  25  |  7.50<H>    Ca    9.2      17 Apr 2021 08:24  Phos  7.4     04-17    TPro  x   /  Alb  2.6<L>  /  TBili  x   /  DBili  x   /  AST  x   /  ALT  x   /  AlkPhos  x   04-17      LIVER FUNCTIONS - ( 17 Apr 2021 08:24 )  Alb: 2.6 g/dL / Pro: x     / ALK PHOS: x     / ALT: x     / AST: x     / GGT: x           Creatinine Trend: 7.50<--, 7.80<--, 6.30<--, 4.80<--, 2.40<--, 6.70<--      Assessment   ESRD, poor access flow  Unable to place karo catheter due to patient agitation  Attempt AVG cannulation    Plan:  Attempted HD via access ; patient increasingly agitated despite medical rx  Treatment terminated at 50 min  Lokelma 10g q 8      Jhonatan Fitch MD

## 2021-04-17 NOTE — PROGRESS NOTE ADULT - SUBJECTIVE AND OBJECTIVE BOX
[facility]       HPI:  71 yr female with PMHx CAD s/p stents '07, HTN, MI, PAF, liver abscess, s/p biliary stent with removal (11/2018; 7/2/19), chronic pancreatitis, DM2 on insulin, neuropathy, ESRD (5/2018) on HD (M/W/F) who presented this morning (4/12/21) via EMS from home with progressive SOB and found to be in hypoxic resp failure with SPO2 of 83% on Rm Air.      In E.D. pt initially placed on NRB with improvement of SPO2 99% with eventual placement of bipap therapy secondary to increased WOB and hypercapnic resp failure with ph 7.30 PCO2 of 50 serum HCO3 of 26, CXR with bilat opacities. Pt also found to have hyperkalemia of 6.8 without ECG changes, hyperglycemic of 266, WBC of 14.2, BNP 79145. Pt received lokelma 10gms po, Reg insuline 10 units IV x1.        Consult called for hypoxic/hypercapnic resp failure and hyperkalemia secondary to ESRD   (12 Apr 2021 15:40)        SUBJECTIVE:      ALLERGIES:  Allergies    ertapenem (Urticaria)  Purell (Rash)    Intolerances          MEDICATIONS  (STANDING):  albuterol/ipratropium for Nebulization 3 milliLiter(s) Nebulizer every 6 hours  atorvastatin 80 milliGRAM(s) Oral at bedtime  cefTRIAXone   IVPB 1000 milliGRAM(s) IV Intermittent every 24 hours  clopidogrel Tablet 75 milliGRAM(s) Oral daily  heparin   Injectable 5000 Unit(s) SubCutaneous every 8 hours  insulin glargine Injectable (LANTUS) 12 Unit(s) SubCutaneous at bedtime  insulin lispro Injectable (ADMELOG) 5 Unit(s) SubCutaneous before breakfast  insulin lispro Injectable (ADMELOG) 5 Unit(s) SubCutaneous before lunch  insulin lispro Injectable (ADMELOG) 5 Unit(s) SubCutaneous before dinner  metoprolol tartrate 25 milliGRAM(s) Oral two times a day  sodium zirconium cyclosilicate 10 Gram(s) Oral every 8 hours    MEDICATIONS  (PRN):  acetaminophen   Tablet .. 650 milliGRAM(s) Oral every 6 hours PRN Mild Pain (1 - 3)  haloperidol    Injectable 1 milliGRAM(s) IntraMuscular every 8 hours PRN Agitation  sodium chloride 0.9% Bolus. 100 milliLiter(s) IV Bolus every 5 minutes PRN SBP LESS THAN or EQUAL to 90 mmHg      REVIEW OF SYSTEMS:  CONSTITUTIONAL: No fever,  RESPIRATORY: No cough, wheezing, shortness of breath  CARDIOVASCULAR: No chest pain, dyspnea, palpitations, dizziness, syncope, paroxysmal nocturnal dyspnea, orthopnea, or arm or leg swelling  GASTROINTESTINAL: No abdominal  or epigastric pain, nausea, vomiting,  diarrhea  NEUROLOGICAL: No headaches,  loss of strength, numbness, or tremors    Vital Signs Last 24 Hrs  T(C): 36.6 (17 Apr 2021 21:20), Max: 37.3 (17 Apr 2021 00:02)  T(F): 97.9 (17 Apr 2021 21:20), Max: 99.1 (17 Apr 2021 00:02)  HR: 79 (17 Apr 2021 21:20) (79 - 102)  BP: 120/70 (17 Apr 2021 21:20) (120/70 - 202/91)  BP(mean): --  RR: 18 (17 Apr 2021 21:20) (17 - 18)  SpO2: 95% (17 Apr 2021 21:20) (91% - 98%)    PHYSICAL EXAM:  HEAD:  Atraumatic, Normocephalic  NECK: Supple and normal thyroid.  No JVD or carotid bruit.   HEART: S1, S2 regular , 1/6 soft ejection systolic murmur in mitral area , no thrill and no gallops .  PULMONARY: Bilateral vesicular breathing , few scattered ronchi and few scattered rales are present .  ABDOMEN: Soft nontender and positive bowl sounds   EXTREMITIES:  No clubbing, cyanosis, or pedal  edema  NEUROLOGICAL: AAOX3 , no focal deficit .    LABS:                        10.8   14.27 )-----------( 315      ( 17 Apr 2021 08:24 )             34.0     04-17    135  |  98  |  56<H>  ----------------------------<  85  5.6<H>   |  25  |  7.50<H>    Ca    9.2      17 Apr 2021 08:24  Phos  7.4     04-17    TPro  x   /  Alb  2.6<L>  /  TBili  x   /  DBili  x   /  AST  x   /  ALT  x   /  AlkPhos  x   04-17    CARDIAC MARKERS ( 17 Apr 2021 08:24 )  .164 ng/mL / x     / x     / x     / x      CARDIAC MARKERS ( 16 Apr 2021 05:16 )  .164 ng/mL / x     / x     / x     / x              BNP      EKG:  ECHO:  IMAGING:    Assessment/Plan    Will continue to follow during hospital course and give further recommendations as needed . Thanks for your referral . if any questions please contact me at office [phone] [phone])  YONNY RIVERO MD [facility]  [address] [id number]  [address]  OFFICE : [phone]  CELL : [phone]    CARDIOLOGY F/U :      HPI:  71 yr female with PMHx CAD s/p stents '07, HTN, MI, PAF, liver abscess, s/p biliary stent with removal (11/2018; 7/2/19), chronic pancreatitis, DM2 on insulin, neuropathy, ESRD (5/2018) on HD (M/W/F) who presented this morning (4/12/21) via EMS from home with progressive SOB and found to be in hypoxic resp failure with SPO2 of 83% on Rm Air.      In E.D. pt initially placed on NRB with improvement of SPO2 99% with eventual placement of bipap therapy secondary to increased WOB and hypercapnic resp failure with ph 7.30 PCO2 of 50 serum HCO3 of 26, CXR with bilat opacities. Pt also found to have hyperkalemia of 6.8 without ECG changes, hyperglycemic of 266, WBC of 14.2, BNP 39722. Pt received lokelma 10gms po, Reg insuline 10 units IV x1.        Consult called for hypoxic/hypercapnic resp failure and hyperkalemia secondary to ESRD   (12 Apr 2021 15:40)        SUBJECTIVE: Patient feeling better       ALLERGIES:  Allergies    ertapenem (Urticaria)  Purell (Rash)    Intolerances          MEDICATIONS  (STANDING):  albuterol/ipratropium for Nebulization 3 milliLiter(s) Nebulizer every 6 hours  atorvastatin 80 milliGRAM(s) Oral at bedtime  cefTRIAXone   IVPB 1000 milliGRAM(s) IV Intermittent every 24 hours  clopidogrel Tablet 75 milliGRAM(s) Oral daily  heparin   Injectable 5000 Unit(s) SubCutaneous every 8 hours  insulin glargine Injectable (LANTUS) 12 Unit(s) SubCutaneous at bedtime  insulin lispro Injectable (ADMELOG) 5 Unit(s) SubCutaneous before breakfast  insulin lispro Injectable (ADMELOG) 5 Unit(s) SubCutaneous before lunch  insulin lispro Injectable (ADMELOG) 5 Unit(s) SubCutaneous before dinner  metoprolol tartrate 25 milliGRAM(s) Oral two times a day  sodium zirconium cyclosilicate 10 Gram(s) Oral every 8 hours    MEDICATIONS  (PRN):  acetaminophen   Tablet .. 650 milliGRAM(s) Oral every 6 hours PRN Mild Pain (1 - 3)  haloperidol    Injectable 1 milliGRAM(s) IntraMuscular every 8 hours PRN Agitation  sodium chloride 0.9% Bolus. 100 milliLiter(s) IV Bolus every 5 minutes PRN SBP LESS THAN or EQUAL to 90 mmHg      REVIEW OF SYSTEMS:  CONSTITUTIONAL: No fever,  RESPIRATORY: No cough, wheezing, and improvement in SOB .  CARDIOVASCULAR: No chest pain,  palpitations, dizziness, syncope, paroxysmal nocturnal dyspnea, or arm or leg swelling and improvement in SOB .  GASTROINTESTINAL: No abdominal  or epigastric pain, nausea, vomiting,  diarrhea  NEUROLOGICAL: No headaches,   numbness, or tremors  Skin : NO itching .  Hematology : No heat and cold intolerance .  psychiatry : Patient is mild confused .  Endocrinology : Patient has no heat and cold tolerance .  Musculoskeletal : Patient has mild arthritis .    Vital Signs Last 24 Hrs  T(C): 36.6 (17 Apr 2021 21:20), Max: 37.3 (17 Apr 2021 00:02)  T(F): 97.9 (17 Apr 2021 21:20), Max: 99.1 (17 Apr 2021 00:02)  HR: 79 (17 Apr 2021 21:20) (79 - 102)  BP: 120/70 (17 Apr 2021 21:20) (120/70 - 202/91)  BP(mean): --  RR: 18 (17 Apr 2021 21:20) (17 - 18)  SpO2: 95% (17 Apr 2021 21:20) (91% - 98%)    PHYSICAL EXAM:  HEAD:  Atraumatic, Normocephalic  NECK: Supple and normal thyroid.  No JVD or carotid bruit.   HEART: S1, S2 regular , 1/6 soft ejection systolic murmur in mitral area , no thrill and no gallops .  PULMONARY: Bilateral vesicular breathing , few scattered rhonchi and few scattered rales are present .  ABDOMEN: Soft nontender and positive bowl sounds   EXTREMITIES:  No clubbing, cyanosis, or pedal  edema  NEUROLOGICAL: AA and mild confused .  Skin : No rashes .  Musculoskeletal : No joint swellings     LABS:                        10.8   14.27 )-----------( 315      ( 17 Apr 2021 08:24 )             34.0     04-17    135  |  98  |  56<H>  ----------------------------<  85  5.6<H>   |  25  |  7.50<H>    Ca    9.2      17 Apr 2021 08:24  Phos  7.4     04-17    TPro  x   /  Alb  2.6<L>  /  TBili  x   /  DBili  x   /  AST  x   /  ALT  x   /  AlkPhos  x   04-17    CARDIAC MARKERS ( 17 Apr 2021 08:24 )  .164 ng/mL / x     / x     / x     / x      CARDIAC MARKERS ( 16 Apr 2021 05:16 )  .164 ng/mL / x     / x     / x     / x            Assessment/Plan  Patient has :  1) CHF and stable . Acute on chronic diastolic heart failure .  2) Acute CVA .  3) No significant cardiac arrythmia on monitor . Patient is in NSR .  4) ESRD and on hemodialysis   5) Remote one episode of atrial fibrillation more than 3 years ago and during gall bladder surgery as per notes in computer and no recurrence of episode since than as per notes in computer .   6) Anemia   Plan : 1) Cardiac stable so far 2) Full anticoagulation contra indicated at this time due to risk of intra cranial bleed 3) Continue Plavix 4) Monitor hemoglobin and electrolytes . 5) prognosis guarded . 6) Monitor hemoglobin and electrolytes .  Will continue to follow during hospital course and give further recommendations as needed . Thanks for your referral . if any questions please contact me at office ([phone])

## 2021-04-17 NOTE — CHART NOTE - NSCHARTNOTEFT_GEN_A_CORE
Patient noncooperative for dialysis catheter placement. Despite numerous attempts by family, RN and this writer to encourage and calm patient to allow us to place the catheter she adamantly refused. As discussed with patient's daughter it is unsafe to proceed with this procedure while patient is agitated and uncooperative. Consent is obtained and I told family I'd be happy to try again either tonight or during day 4/18 should patient be more receptive and cooperative. Nephrology aware of situation and understanding.

## 2021-04-17 NOTE — CONSULT NOTE ADULT - CONSULT REQUESTED DATE/TIME
13-Apr-2021 17:08
15-Apr-2021 10:01
17-Apr-2021 12:46
13-Apr-2021 23:52
12-Apr-2021 11:22
12-Apr-2021 12:10
14-Apr-2021 09:49
15-Apr-2021 22:16

## 2021-04-17 NOTE — PROGRESS NOTE ADULT - SUBJECTIVE AND OBJECTIVE BOX
Patient is a 71y old  Female who presents with a chief complaint of CHF (16 Apr 2021 20:19)      INTERVAL /OVERNIGHT EVENTS: agitated intermitently, non functioning HD    MEDICATIONS  (STANDING):  albuterol/ipratropium for Nebulization 3 milliLiter(s) Nebulizer every 6 hours  atorvastatin 80 milliGRAM(s) Oral at bedtime  cefTRIAXone   IVPB 1000 milliGRAM(s) IV Intermittent every 24 hours  clopidogrel Tablet 75 milliGRAM(s) Oral daily  heparin   Injectable 5000 Unit(s) SubCutaneous every 8 hours  insulin glargine Injectable (LANTUS) 12 Unit(s) SubCutaneous at bedtime  insulin lispro Injectable (ADMELOG) 5 Unit(s) SubCutaneous before breakfast  insulin lispro Injectable (ADMELOG) 5 Unit(s) SubCutaneous before lunch  insulin lispro Injectable (ADMELOG) 5 Unit(s) SubCutaneous before dinner  metoprolol tartrate 25 milliGRAM(s) Oral two times a day  sodium zirconium cyclosilicate 10 Gram(s) Oral every 8 hours    MEDICATIONS  (PRN):  acetaminophen   Tablet .. 650 milliGRAM(s) Oral every 6 hours PRN Mild Pain (1 - 3)  sodium chloride 0.9% Bolus. 100 milliLiter(s) IV Bolus every 5 minutes PRN SBP LESS THAN or EQUAL to 90 mmHg      Allergies    ertapenem (Urticaria)  Purell (Rash)    Intolerances        REVIEW OF SYSTEMS:  unable to obtain    Vital Signs Last 24 Hrs  T(C): 36.9 (17 Apr 2021 16:50), Max: 37.6 (16 Apr 2021 19:51)  T(F): 98.4 (17 Apr 2021 16:50), Max: 99.7 (16 Apr 2021 19:51)  HR: 99 (17 Apr 2021 16:50) (79 - 99)  BP: 166/85 (17 Apr 2021 16:50) (138/56 - 202/91)  BP(mean): --  RR: 18 (17 Apr 2021 15:45) (17 - 18)  SpO2: 98% (17 Apr 2021 15:45) (91% - 98%)    PHYSICAL EXAM:  GENERAL: NAD, well-groomed, well-developed  HEAD:  Atraumatic, Normocephalic  EYES: EOMI, PERRLA, conjunctiva and sclera clear  ENMT: No tonsillar erythema, exudates, or enlargement; Moist mucous membranes, Good dentition, No lesions  NECK: Supple, No JVD, Normal thyroid  NERVOUS SYSTEM:  Alert & awake; Motor Strength 5/5 B/L upper and lower extremities; DTRs 2+ intact and symmetric  CHEST/LUNG: Clear to auscultation bilaterally; No rales, rhonchi, wheezing, or rubs  HEART: Regular rate and rhythm; No murmurs, rubs, or gallops  ABDOMEN: Soft, Nontender, Nondistended; Bowel sounds present  EXTREMITIES:  2+ Peripheral Pulses, No clubbing, cyanosis, or edema  LYMPH: No lymphadenopathy noted  SKIN: No rashes or lesions    LABS:                        10.8   14.27 )-----------( 315      ( 17 Apr 2021 08:24 )             34.0     17 Apr 2021 08:24    135    |  98     |  56     ----------------------------<  85     5.6     |  25     |  7.50     Ca    9.2        17 Apr 2021 08:24  Phos  7.4       17 Apr 2021 08:24    TPro  x      /  Alb  2.6    /  TBili  x      /  DBili  x      /  AST  x      /  ALT  x      /  AlkPhos  x      17 Apr 2021 08:24        CAPILLARY BLOOD GLUCOSE      POCT Blood Glucose.: 109 mg/dL (17 Apr 2021 16:33)  POCT Blood Glucose.: 73 mg/dL (17 Apr 2021 12:15)  POCT Blood Glucose.: 123 mg/dL (17 Apr 2021 10:21)  POCT Blood Glucose.: 107 mg/dL (17 Apr 2021 08:00)  POCT Blood Glucose.: 121 mg/dL (16 Apr 2021 21:23)      RADIOLOGY & ADDITIONAL TESTS:    Notes Reviewed:  [ x] YES  [ ] NO    Care Discussed with Consultants/Other Providers [x ] YES  [ ] NO

## 2021-04-18 LAB
ANION GAP SERPL CALC-SCNC: 12 MMOL/L — SIGNIFICANT CHANGE UP (ref 5–17)
BASOPHILS # BLD AUTO: 0.03 K/UL — SIGNIFICANT CHANGE UP (ref 0–0.2)
BASOPHILS NFR BLD AUTO: 0.2 % — SIGNIFICANT CHANGE UP (ref 0–2)
BUN SERPL-MCNC: 64 MG/DL — HIGH (ref 7–23)
CALCIUM SERPL-MCNC: 9.3 MG/DL — SIGNIFICANT CHANGE UP (ref 8.5–10.1)
CHLORIDE SERPL-SCNC: 97 MMOL/L — SIGNIFICANT CHANGE UP (ref 96–108)
CO2 SERPL-SCNC: 25 MMOL/L — SIGNIFICANT CHANGE UP (ref 22–31)
CREAT SERPL-MCNC: 7.3 MG/DL — HIGH (ref 0.5–1.3)
EOSINOPHIL # BLD AUTO: 0.26 K/UL — SIGNIFICANT CHANGE UP (ref 0–0.5)
EOSINOPHIL NFR BLD AUTO: 2 % — SIGNIFICANT CHANGE UP (ref 0–6)
GLUCOSE SERPL-MCNC: 136 MG/DL — HIGH (ref 70–99)
HCT VFR BLD CALC: 33.1 % — LOW (ref 34.5–45)
HGB BLD-MCNC: 10.4 G/DL — LOW (ref 11.5–15.5)
IMM GRANULOCYTES NFR BLD AUTO: 0.4 % — SIGNIFICANT CHANGE UP (ref 0–1.5)
LYMPHOCYTES # BLD AUTO: 2.68 K/UL — SIGNIFICANT CHANGE UP (ref 1–3.3)
LYMPHOCYTES # BLD AUTO: 20.8 % — SIGNIFICANT CHANGE UP (ref 13–44)
MCHC RBC-ENTMCNC: 28.4 PG — SIGNIFICANT CHANGE UP (ref 27–34)
MCHC RBC-ENTMCNC: 31.4 GM/DL — LOW (ref 32–36)
MCV RBC AUTO: 90.4 FL — SIGNIFICANT CHANGE UP (ref 80–100)
MONOCYTES # BLD AUTO: 0.87 K/UL — SIGNIFICANT CHANGE UP (ref 0–0.9)
MONOCYTES NFR BLD AUTO: 6.7 % — SIGNIFICANT CHANGE UP (ref 2–14)
NEUTROPHILS # BLD AUTO: 9.01 K/UL — HIGH (ref 1.8–7.4)
NEUTROPHILS NFR BLD AUTO: 69.9 % — SIGNIFICANT CHANGE UP (ref 43–77)
NRBC # BLD: 0 /100 WBCS — SIGNIFICANT CHANGE UP (ref 0–0)
PLATELET # BLD AUTO: 351 K/UL — SIGNIFICANT CHANGE UP (ref 150–400)
POTASSIUM SERPL-MCNC: 4.8 MMOL/L — SIGNIFICANT CHANGE UP (ref 3.5–5.3)
POTASSIUM SERPL-SCNC: 4.8 MMOL/L — SIGNIFICANT CHANGE UP (ref 3.5–5.3)
RBC # BLD: 3.66 M/UL — LOW (ref 3.8–5.2)
RBC # FLD: 13.5 % — SIGNIFICANT CHANGE UP (ref 10.3–14.5)
SODIUM SERPL-SCNC: 134 MMOL/L — LOW (ref 135–145)
WBC # BLD: 12.9 K/UL — HIGH (ref 3.8–10.5)
WBC # FLD AUTO: 12.9 K/UL — HIGH (ref 3.8–10.5)

## 2021-04-18 RX ADMIN — INSULIN GLARGINE 12 UNIT(S): 100 INJECTION, SOLUTION SUBCUTANEOUS at 22:10

## 2021-04-18 RX ADMIN — HEPARIN SODIUM 5000 UNIT(S): 5000 INJECTION INTRAVENOUS; SUBCUTANEOUS at 06:21

## 2021-04-18 RX ADMIN — CEFTRIAXONE 100 MILLIGRAM(S): 500 INJECTION, POWDER, FOR SOLUTION INTRAMUSCULAR; INTRAVENOUS at 13:04

## 2021-04-18 RX ADMIN — SODIUM ZIRCONIUM CYCLOSILICATE 10 GRAM(S): 10 POWDER, FOR SUSPENSION ORAL at 01:01

## 2021-04-18 RX ADMIN — SODIUM ZIRCONIUM CYCLOSILICATE 10 GRAM(S): 10 POWDER, FOR SUSPENSION ORAL at 13:04

## 2021-04-18 NOTE — PROGRESS NOTE ADULT - SUBJECTIVE AND OBJECTIVE BOX
[facility] DIVISION of INFECTIOUS DISEASE  Nate Gonzáles MD PhD, Daria Bob MD, Anna Zamora MD, Arjun Mora MD  and providing coverage with Sonya Estes MD and Norah Ruiz MD  Providing Infectious Disease Consultations at [facility], [facility], [facility]'s    Office# [phone] to schedule follow up appointments  Answering Service for urgent calls or New Consults [phone]  Cell# to text for urgent issues Nate Gonzáles [phone]     infectious diseases progress note:    IRENE TAYLOR is a 71y y. o. Female patient    No concerning overnight events    Allergies    ertapenem (Urticaria)  Purell (Rash)    Intolerances        ANTIBIOTICS/RELEVANT:  antimicrobials  cefTRIAXone   IVPB 1000 milliGRAM(s) IV Intermittent every 24 hours    immunologic:    OTHER:  acetaminophen   Tablet .. 650 milliGRAM(s) Oral every 6 hours PRN  albuterol/ipratropium for Nebulization 3 milliLiter(s) Nebulizer every 6 hours  atorvastatin 80 milliGRAM(s) Oral at bedtime  clopidogrel Tablet 75 milliGRAM(s) Oral daily  haloperidol    Injectable 1 milliGRAM(s) IntraMuscular every 8 hours PRN  heparin   Injectable 5000 Unit(s) SubCutaneous every 8 hours  insulin glargine Injectable (LANTUS) 12 Unit(s) SubCutaneous at bedtime  insulin lispro Injectable (ADMELOG) 5 Unit(s) SubCutaneous before breakfast  insulin lispro Injectable (ADMELOG) 5 Unit(s) SubCutaneous before lunch  insulin lispro Injectable (ADMELOG) 5 Unit(s) SubCutaneous before dinner  metoprolol tartrate 25 milliGRAM(s) Oral two times a day  sodium chloride 0.9% Bolus. 100 milliLiter(s) IV Bolus every 5 minutes PRN  sodium zirconium cyclosilicate 10 Gram(s) Oral every 8 hours      Objective:  Vital Signs Last 24 Hrs  T(C): 36.6 (17 Apr 2021 21:20), Max: 36.9 (17 Apr 2021 15:45)  T(F): 97.9 (17 Apr 2021 21:20), Max: 98.5 (17 Apr 2021 15:45)  HR: 78 (18 Apr 2021 06:14) (72 - 102)  BP: 123/64 (18 Apr 2021 06:14) (120/70 - 202/91)  BP(mean): --  RR: 16 (18 Apr 2021 06:14) (16 - 18)  SpO2: 94% (18 Apr 2021 06:14) (94% - 98%)    T(C): 36.6 (04-17-21 @ 21:20), Max: 37.6 (04-16-21 @ 19:51)  T(C): 36.6 (04-17-21 @ 21:20), Max: 37.6 (04-16-21 @ 19:51)  T(C): 36.6 (04-17-21 @ 21:20), Max: 37.6 (04-16-21 @ 19:51)    PHYSICAL EXAM:  HEENT: NC atraumatic  Neck: supple  Respiratory: no accessory muscle use, breathing comfortably  Cardiovascular: distant  Gastrointestinal: normal appearing, nondistended  Extremities: no clubbing, no cyanosis,        LABS:                          10.4   12.90 )-----------( 351      ( 18 Apr 2021 07:33 )             33.1       12.90 04-18 @ 07:33  14.27 04-17 @ 08:24  16.61 04-15 @ 11:49  13.08 04-14 @ 09:23  14.41 04-13 @ 07:09  13.58 04-12 @ 18:08  14.16 04-12 @ 09:59      04-18    134<L>  |  97  |  64<H>  ----------------------------<  136<H>  4.8   |  25  |  7.30<H>    Ca    9.3      18 Apr 2021 07:33  Phos  7.4     04-17    TPro  x   /  Alb  2.6<L>  /  TBili  x   /  DBili  x   /  AST  x   /  ALT  x   /  AlkPhos  x   04-17      Creatinine, Serum: 7.30 mg/dL (04-18-21 @ 07:33)  Creatinine, Serum: 7.50 mg/dL (04-17-21 @ 08:24)  Creatinine, Serum: 7.80 mg/dL (04-15-21 @ 11:49)  Creatinine, Serum: 7.80 mg/dL (04-15-21 @ 11:49)  Creatinine, Serum: 6.30 mg/dL (04-14-21 @ 08:13)  Creatinine, Serum: 4.80 mg/dL (04-13-21 @ 07:09)  Creatinine, Serum: 2.40 mg/dL (04-12-21 @ 18:29)  Creatinine, Serum: 6.70 mg/dL (04-12-21 @ 09:59)                INFLAMMATORY MARKERS  Auto Neutrophil #: 9.01 K/uL (04-18-21 @ 07:33)  Auto Lymphocyte #: 2.68 K/uL (04-18-21 @ 07:33)  Auto Neutrophil #: 11.35 K/uL (04-12-21 @ 18:08)  Auto Lymphocyte #: 1.68 K/uL (04-12-21 @ 18:08)  Auto Neutrophil #: 11.65 K/uL (04-12-21 @ 09:59)  Auto Lymphocyte #: 1.95 K/uL (04-12-21 @ 09:59)    Lactate, Blood: 1.1 mmol/L (04-12-21 @ 09:59)    Auto Eosinophil #: 0.26 K/uL (04-18-21 @ 07:33)  Auto Eosinophil #: 0.04 K/uL (04-12-21 @ 18:08)  Auto Eosinophil #: 0.06 K/uL (04-12-21 @ 09:59)        Procalcitonin, Serum: 0.61 ng/mL (04-13-21 @ 19:15)    Troponin I, Serum: .164 ng/mL (04-17-21 @ 08:24)  Troponin I, Serum: .164 ng/mL (04-16-21 @ 05:16)  Troponin I, Serum: .038 ng/mL (04-14-21 @ 08:13)  Troponin I, Serum: .047 ng/mL (04-13-21 @ 07:09)  Troponin I, Serum: .044 ng/mL (04-12-21 @ 18:29)  Troponin I, Serum: .036 ng/mL (04-12-21 @ 09:59)    Creatine Kinase, Serum: 58 U/L (04-13-21 @ 07:09)  Creatine Kinase, Serum: 50 U/L (04-12-21 @ 18:29)              Activated Partial Thromboplastin Time: 31.1 sec (04-12-21 @ 18:08)  INR: 1.17 ratio (04-12-21 @ 18:08)  INR: 1.19 ratio (04-12-21 @ 09:59)  Activated Partial Thromboplastin Time: 34.3 sec (04-12-21 @ 09:59)          MICROBIOLOGY:              RADIOLOGY & ADDITIONAL STUDIES:

## 2021-04-18 NOTE — PROVIDER CONTACT NOTE (OTHER) - SITUATION
Pt non compliant with tele monitor   non compliant with IV access and antibiotics.
Non-compliance with medication and cardiac monitor

## 2021-04-18 NOTE — PROGRESS NOTE ADULT - SUBJECTIVE AND OBJECTIVE BOX
`Neurology follow up note    IRENE [id number]      Interval History:    Patient feels ok no new complaints.    MEDICATIONS    acetaminophen   Tablet .. 650 milliGRAM(s) Oral every 6 hours PRN  albuterol/ipratropium for Nebulization 3 milliLiter(s) Nebulizer every 6 hours  atorvastatin 80 milliGRAM(s) Oral at bedtime  cefTRIAXone   IVPB 1000 milliGRAM(s) IV Intermittent every 24 hours  clopidogrel Tablet 75 milliGRAM(s) Oral daily  haloperidol    Injectable 1 milliGRAM(s) IntraMuscular every 8 hours PRN  heparin   Injectable 5000 Unit(s) SubCutaneous every 8 hours  insulin glargine Injectable (LANTUS) 12 Unit(s) SubCutaneous at bedtime  insulin lispro Injectable (ADMELOG) 5 Unit(s) SubCutaneous before breakfast  insulin lispro Injectable (ADMELOG) 5 Unit(s) SubCutaneous before lunch  insulin lispro Injectable (ADMELOG) 5 Unit(s) SubCutaneous before dinner  metoprolol tartrate 25 milliGRAM(s) Oral two times a day  sodium chloride 0.9% Bolus. 100 milliLiter(s) IV Bolus every 5 minutes PRN  sodium zirconium cyclosilicate 10 Gram(s) Oral every 8 hours      Allergies    ertapenem (Urticaria)  Purell (Rash)    Intolerances            Vital Signs Last 24 Hrs  T(C): 36.6 (17 Apr 2021 21:20), Max: 36.9 (17 Apr 2021 15:45)  T(F): 97.9 (17 Apr 2021 21:20), Max: 98.5 (17 Apr 2021 15:45)  HR: 78 (18 Apr 2021 06:14) (72 - 102)  BP: 123/64 (18 Apr 2021 06:14) (120/70 - 202/91)  BP(mean): --  RR: 16 (18 Apr 2021 06:14) (16 - 18)  SpO2: 94% (18 Apr 2021 06:14) (94% - 98%)    REVIEW OF SYSTEMS:  Very limited but feels ok no significant numbness in feet     PHYSICAL EXAMINATION:  .  HEENT:  Head:  Normocephalic, atraumatic.  Eyes:  No scleral icterus.  Ears:  Hearing appeared to be intact.  NECK:  Supple.  CARDIOVASCULAR:  S1 and S2 heard.  RESPIRATORY:  Good air entry bilaterally.  ABDOMEN:  Soft, nontender.  EXTREMITIES:  No clubbing or cyanosis were noted.      NEUROLOGIC:  The patient is awake and alert.  Location was home  Able to say daughter's name.  Year and month was unknown.  was able to tell number of finger in each hand   Extraocular movements were intact.  Speech was fluent.  Motor:  Bilateral upper 4/5.  Bilateral lower extremities with plantar stimulation, slight flexation at the hip and knee, would say overall 3-/5.  The patient would not follow complex or simple commands.              LABS:  CBC Full  -  ( 18 Apr 2021 07:33 )  WBC Count : 12.90 K/uL  RBC Count : 3.66 M/uL  Hemoglobin : 10.4 g/dL  Hematocrit : 33.1 %  Platelet Count - Automated : 351 K/uL  Mean Cell Volume : 90.4 fl  Mean Cell Hemoglobin : 28.4 pg  Mean Cell Hemoglobin Concentration : 31.4 gm/dL  Auto Neutrophil # : 9.01 K/uL  Auto Lymphocyte # : 2.68 K/uL  Auto Monocyte # : 0.87 K/uL  Auto Eosinophil # : 0.26 K/uL  Auto Basophil # : 0.03 K/uL  Auto Neutrophil % : 69.9 %  Auto Lymphocyte % : 20.8 %  Auto Monocyte % : 6.7 %  Auto Eosinophil % : 2.0 %  Auto Basophil % : 0.2 %      04-18    134<L>  |  97  |  64<H>  ----------------------------<  136<H>  4.8   |  25  |  7.30<H>    Ca    9.3      18 Apr 2021 07:33  Phos  7.4     04-17    TPro  x   /  Alb  2.6<L>  /  TBili  x   /  DBili  x   /  AST  x   /  ALT  x   /  AlkPhos  x   04-17    Hemoglobin A1C:     LIVER FUNCTIONS - ( 17 Apr 2021 08:24 )  Alb: 2.6 g/dL / Pro: x     / ALK PHOS: x     / ALT: x     / AST: x     / GGT: x           Vitamin B12         RADIOLOGY      ANALYSIS AND PLAN:  This is a 71-year-old with episode of altered mental status.    For episode of altered mental status, suspect most likely this is metabolic in nature, questionable secondary to any type of hospital induced delirium psychosis  mental status is better    MRI imaging of the brain noted   For history of diabetes, strict control of blood sugars.  For history of neuropathy, the patient does have elevated renal function.  I would recommend Nephrology followup in regards to possibly adjustment dose of the patient's gabapentin decreased to 100 tid  For history of hypertension, monitor systolic blood pressure.  ct chest noted antibiotics as needed   For paroxysmal atrial fibrillation, telemetry evaluation.  Cardiology followup as needed  for CVA will dc asa change to plavix  if patient  does have  AFIB would recommends dc plavix and start AC after 2 weeks total  do to size of CVA  echo No intracardiac mass,thrombus or vegetations and  tumor.  more interactive today     Spoke with daughter, Frederick; her telephone number is [phone] 4/18/2021 use her to translate and ask mom questions as per did talk to her "perfectly "      Greater than 40 minutes of time was spent with the patient, plan of care, reviewing data, with greater than 50% of the visit was spent counseling and/or coordinating care with multidisciplinary healthcare team

## 2021-04-18 NOTE — PROVIDER CONTACT NOTE (OTHER) - ACTION/TREATMENT ORDERED:
BYRON Diaz made aware. Will continue to monitor pt.
Chart patient refused to wear monitor   Dr. Mac will contact Dr. Gonzáles about the patient

## 2021-04-18 NOTE — PROGRESS NOTE ADULT - ASSESSMENT
71 yr female with PMHx CAD s/p stents '07, HTN, MI, PAF, liver abscess, s/p biliary stent with removal (11/2018; 7/2/19), chronic pancreatitis, DM2 on insulin, neuropathy, ESRD (5/2018) on HD (M/W/F) who presented (4/12/21) adm with hypoxic renal failure    Leukocytosis  despite abn lung imaging pt appears clinically stable with no clear indication of airspace disease, elevation is neutrophilia without clearing of eosinophils, recommend short course fo empiric CEFTRIAXONE-started 4/17 for possible PNA and will follow response to therapy    PNA  as above, will follow daily CBC w diff

## 2021-04-18 NOTE — PROGRESS NOTE ADULT - SUBJECTIVE AND OBJECTIVE BOX
Patient is a 71y old  Female who presents with a chief complaint of CHF (16 Apr 2021 20:19)      INTERVAL /OVERNIGHT EVENTS: agitated intermitently, non functioning HD    MEDICATIONS  (STANDING):  albuterol/ipratropium for Nebulization 3 milliLiter(s) Nebulizer every 6 hours  atorvastatin 80 milliGRAM(s) Oral at bedtime  cefTRIAXone   IVPB 1000 milliGRAM(s) IV Intermittent every 24 hours  clopidogrel Tablet 75 milliGRAM(s) Oral daily  heparin   Injectable 5000 Unit(s) SubCutaneous every 8 hours  insulin glargine Injectable (LANTUS) 12 Unit(s) SubCutaneous at bedtime  insulin lispro Injectable (ADMELOG) 5 Unit(s) SubCutaneous before breakfast  insulin lispro Injectable (ADMELOG) 5 Unit(s) SubCutaneous before lunch  insulin lispro Injectable (ADMELOG) 5 Unit(s) SubCutaneous before dinner  metoprolol tartrate 25 milliGRAM(s) Oral two times a day  sodium zirconium cyclosilicate 10 Gram(s) Oral every 8 hours    MEDICATIONS  (PRN):  acetaminophen   Tablet .. 650 milliGRAM(s) Oral every 6 hours PRN Mild Pain (1 - 3)  sodium chloride 0.9% Bolus. 100 milliLiter(s) IV Bolus every 5 minutes PRN SBP LESS THAN or EQUAL to 90 mmHg      Allergies    ertapenem (Urticaria)  Purell (Rash)    Intolerances        REVIEW OF SYSTEMS:  unable to obtain    Vital Signs Last 24 Hrs  T(C): 36.9 (17 Apr 2021 16:50), Max: 37.6 (16 Apr 2021 19:51)  T(F): 98.4 (17 Apr 2021 16:50), Max: 99.7 (16 Apr 2021 19:51)  HR: 99 (17 Apr 2021 16:50) (79 - 99)  BP: 166/85 (17 Apr 2021 16:50) (138/56 - 202/91)  RR: 18 (17 Apr 2021 15:45) (17 - 18)  SpO2: 98% (17 Apr 2021 15:45) (91% - 98%)    PHYSICAL EXAM:  GENERAL: NAD, well-groomed, well-developed  HEAD:  Atraumatic, Normocephalic  EYES: EOMI, PERRLA, conjunctiva and sclera clear  ENMT: No tonsillar erythema, exudates, or enlargement; Moist mucous membranes, Good dentition, No lesions  NECK: Supple, No JVD, Normal thyroid  NERVOUS SYSTEM:  Alert & awake; Motor Strength 5/5 B/L upper and lower extremities; DTRs 2+ intact and symmetric  CHEST/LUNG: Clear to auscultation bilaterally; No rales, rhonchi, wheezing, or rubs  HEART: Regular rate and rhythm; No murmurs, rubs, or gallops  ABDOMEN: Soft, Nontender, Nondistended; Bowel sounds present  EXTREMITIES:  2+ Peripheral Pulses, No clubbing, cyanosis, or edema  LYMPH: No lymphadenopathy noted  SKIN: No rashes or lesions    LABS:                        10.4   12.90 )-----------( 351      ( 18 Apr 2021 07:33 )             33.1     18 Apr 2021 07:33    134    |  97     |  64     ----------------------------<  136    4.8     |  25     |  7.30     Ca    9.3        18 Apr 2021 07:33  Phos  7.4       17 Apr 2021 08:24    TPro  x      /  Alb  2.6    /  TBili  x      /  DBili  x      /  AST  x      /  ALT  x      /  AlkPhos  x      17 Apr 2021 08:24    LIVER FUNCTIONS - ( 17 Apr 2021 08:24 )  Alb: 2.6 g/dL / Pro: x     / ALK PHOS: x     / ALT: x     / AST: x     / GGT: x             CAPILLARY BLOOD GLUCOSE      POCT Blood Glucose.: 134 mg/dL (18 Apr 2021 12:06)  POCT Blood Glucose.: 138 mg/dL (18 Apr 2021 07:41)  POCT Blood Glucose.: 203 mg/dL (17 Apr 2021 21:15)  POCT Blood Glucose.: 109 mg/dL (17 Apr 2021 16:33)    CARDIAC MARKERS ( 17 Apr 2021 08:24 )  .164 ng/mL / x     / x     / x     / x                                10.8   14.27 )-----------( 315      ( 17 Apr 2021 08:24 )             34.0     17 Apr 2021 08:24    135    |  98     |  56     ----------------------------<  85     5.6     |  25     |  7.50     Ca    9.2        17 Apr 2021 08:24  Phos  7.4       17 Apr 2021 08:24    TPro  x      /  Alb  2.6    /  TBili  x      /  DBili  x      /  AST  x      /  ALT  x      /  AlkPhos  x      17 Apr 2021 08:24        CAPILLARY BLOOD GLUCOSE      POCT Blood Glucose.: 109 mg/dL (17 Apr 2021 16:33)  POCT Blood Glucose.: 73 mg/dL (17 Apr 2021 12:15)  POCT Blood Glucose.: 123 mg/dL (17 Apr 2021 10:21)  POCT Blood Glucose.: 107 mg/dL (17 Apr 2021 08:00)  POCT Blood Glucose.: 121 mg/dL (16 Apr 2021 21:23)      RADIOLOGY & ADDITIONAL TESTS:    Notes Reviewed:  [ x] YES  [ ] NO    Care Discussed with Consultants/Other Providers [x ] YES  [ ] NO

## 2021-04-18 NOTE — PROGRESS NOTE ADULT - SUBJECTIVE AND OBJECTIVE BOX
[facility] [place]       HPI:  71 yr female with PMHx CAD s/p stents '07, HTN, MI, PAF, liver abscess, s/p biliary stent with removal (11/2018; 7/2/19), chronic pancreatitis, DM2 on insulin, neuropathy, ESRD (5/2018) on HD (M/W/F) who presented this morning (4/12/21) via EMS from home with progressive SOB and found to be in hypoxic resp failure with SPO2 of 83% on Rm Air.      In E.D. pt initially placed on NRB with improvement of SPO2 99% with eventual placement of bipap therapy secondary to increased WOB and hypercapnic resp failure with ph 7.30 PCO2 of 50 serum HCO3 of 26, CXR with bilat opacities. Pt also found to have hyperkalemia of 6.8 without ECG changes, hyperglycemic of 266, WBC of 14.2, BNP 02520. Pt received lokelma 10gms po, Reg insuline 10 units IV x1.        Consult called for hypoxic/hypercapnic resp failure and hyperkalemia secondary to ESRD   (12 Apr 2021 15:40)        SUBJECTIVE:      ALLERGIES:  Allergies    ertapenem (Urticaria)  Purell (Rash)    Intolerances          MEDICATIONS  (STANDING):  albuterol/ipratropium for Nebulization 3 milliLiter(s) Nebulizer every 6 hours  atorvastatin 80 milliGRAM(s) Oral at bedtime  cefTRIAXone   IVPB 1000 milliGRAM(s) IV Intermittent every 24 hours  clopidogrel Tablet 75 milliGRAM(s) Oral daily  heparin   Injectable 5000 Unit(s) SubCutaneous every 8 hours  insulin glargine Injectable (LANTUS) 12 Unit(s) SubCutaneous at bedtime  insulin lispro Injectable (ADMELOG) 5 Unit(s) SubCutaneous before breakfast  insulin lispro Injectable (ADMELOG) 5 Unit(s) SubCutaneous before lunch  insulin lispro Injectable (ADMELOG) 5 Unit(s) SubCutaneous before dinner  metoprolol tartrate 25 milliGRAM(s) Oral two times a day  sodium zirconium cyclosilicate 10 Gram(s) Oral every 8 hours    MEDICATIONS  (PRN):  acetaminophen   Tablet .. 650 milliGRAM(s) Oral every 6 hours PRN Mild Pain (1 - 3)  haloperidol    Injectable 1 milliGRAM(s) IntraMuscular every 8 hours PRN Agitation  sodium chloride 0.9% Bolus. 100 milliLiter(s) IV Bolus every 5 minutes PRN SBP LESS THAN or EQUAL to 90 mmHg      REVIEW OF SYSTEMS:  CONSTITUTIONAL: No fever,  RESPIRATORY: No cough, wheezing, shortness of breath  CARDIOVASCULAR: No chest pain, dyspnea, palpitations, dizziness, syncope, paroxysmal nocturnal dyspnea, orthopnea, or arm or leg swelling  GASTROINTESTINAL: No abdominal  or epigastric pain, nausea, vomiting,  diarrhea  NEUROLOGICAL: No headaches,  loss of strength, numbness, or tremors    Vital Signs Last 24 Hrs  T(C): 36.3 (18 Apr 2021 18:35), Max: 36.3 (18 Apr 2021 18:35)  T(F): 97.3 (18 Apr 2021 18:35), Max: 97.3 (18 Apr 2021 18:35)  HR: 86 (18 Apr 2021 18:35) (72 - 86)  BP: 107/70 (18 Apr 2021 18:35) (98/65 - 123/64)  BP(mean): --  RR: 16 (18 Apr 2021 18:35) (16 - 16)  SpO2: 95% (18 Apr 2021 18:35) (94% - 96%)    PHYSICAL EXAM:  HEAD:  Atraumatic, Normocephalic  NECK: Supple and normal thyroid.  No JVD or carotid bruit.   HEART: S1, S2 regular , 1/6 soft ejection systolic murmur in mitral area , no thrill and no gallops .  PULMONARY: Bilateral vesicular breathing , few scattered ronchi and few scattered rales are present .  ABDOMEN: Soft nontender and positive bowl sounds   EXTREMITIES:  No clubbing, cyanosis, or pedal  edema  NEUROLOGICAL: AAOX3 , no focal deficit .    LABS:                        10.4   12.90 )-----------( 351      ( 18 Apr 2021 07:33 )             33.1     04-18    134<L>  |  97  |  64<H>  ----------------------------<  136<H>  4.8   |  25  |  7.30<H>    Ca    9.3      18 Apr 2021 07:33  Phos  7.4     04-17    TPro  x   /  Alb  2.6<L>  /  TBili  x   /  DBili  x   /  AST  x   /  ALT  x   /  AlkPhos  x   04-17    CARDIAC MARKERS ( 17 Apr 2021 08:24 )  .164 ng/mL / x     / x     / x     / x              BNP      EKG:  ECHO:  IMAGING:    Assessment/Plan    Will continue to follow during hospital course and give further recommendations as needed . Thanks for your referral . if any questions please contact me at office ([phone])cell [phone])  YONNY RIVERO MD [facility]  [address] [id number]  [address]  OFFICE : [phone]  CELL ; [phone]    CARDIOLOGY F/U :       HPI:  71 yr female with PMHx CAD s/p stents '07, HTN, MI, PAF, liver abscess, s/p biliary stent with removal (11/2018; 7/2/19), chronic pancreatitis, DM2 on insulin, neuropathy, ESRD (5/2018) on HD (M/W/F) who presented this morning (4/12/21) via EMS from home with progressive SOB and found to be in hypoxic resp failure with SPO2 of 83% on Rm Air.      In E.D. pt initially placed on NRB with improvement of SPO2 99% with eventual placement of bipap therapy secondary to increased WOB and hypercapnic resp failure with ph 7.30 PCO2 of 50 serum HCO3 of 26, CXR with bilat opacities. Pt also found to have hyperkalemia of 6.8 without ECG changes, hyperglycemic of 266, WBC of 14.2, BNP 10164. Pt received lokelma 10gms po, Reg insuline 10 units IV x1.        Consult called for hypoxic/hypercapnic resp failure and hyperkalemia secondary to ESRD   (12 Apr 2021 15:40)        SUBJECTIVE: Patient mild confused .      ALLERGIES:  Allergies    ertapenem (Urticaria)  Purell (Rash)    Intolerances          MEDICATIONS  (STANDING):  albuterol/ipratropium for Nebulization 3 milliLiter(s) Nebulizer every 6 hours  atorvastatin 80 milliGRAM(s) Oral at bedtime  cefTRIAXone   IVPB 1000 milliGRAM(s) IV Intermittent every 24 hours  clopidogrel Tablet 75 milliGRAM(s) Oral daily  heparin   Injectable 5000 Unit(s) SubCutaneous every 8 hours  insulin glargine Injectable (LANTUS) 12 Unit(s) SubCutaneous at bedtime  insulin lispro Injectable (ADMELOG) 5 Unit(s) SubCutaneous before breakfast  insulin lispro Injectable (ADMELOG) 5 Unit(s) SubCutaneous before lunch  insulin lispro Injectable (ADMELOG) 5 Unit(s) SubCutaneous before dinner  metoprolol tartrate 25 milliGRAM(s) Oral two times a day  sodium zirconium cyclosilicate 10 Gram(s) Oral every 8 hours    MEDICATIONS  (PRN):  acetaminophen   Tablet .. 650 milliGRAM(s) Oral every 6 hours PRN Mild Pain (1 - 3)  haloperidol    Injectable 1 milliGRAM(s) IntraMuscular every 8 hours PRN Agitation  sodium chloride 0.9% Bolus. 100 milliLiter(s) IV Bolus every 5 minutes PRN SBP LESS THAN or EQUAL to 90 mmHg      REVIEW OF SYSTEMS:  CONSTITUTIONAL: No fever,  RESPIRATORY: No cough, wheezing, shortness of breath  CARDIOVASCULAR: No chest pain, dyspnea, palpitations, dizziness, syncope, paroxysmal nocturnal dyspnea, orthopnea, or arm or leg swelling  GASTROINTESTINAL: No abdominal  or epigastric pain, nausea, vomiting,  diarrhea  NEUROLOGICAL: No headaches,  numbness, or tremors  Skin : No itching .  Hematology : No bleeding .  Endocrinology : No heat and cold tolerance .  Psychiatry : Patient is mild confused .  Musculoskeletal : Patient has mild arthritis .    Vital Signs Last 24 Hrs  T(C): 36.3 (18 Apr 2021 18:35), Max: 36.3 (18 Apr 2021 18:35)  T(F): 97.3 (18 Apr 2021 18:35), Max: 97.3 (18 Apr 2021 18:35)  HR: 86 (18 Apr 2021 18:35) (72 - 86)  BP: 107/70 (18 Apr 2021 18:35) (98/65 - 123/64)  BP(mean): --  RR: 16 (18 Apr 2021 18:35) (16 - 16)  SpO2: 95% (18 Apr 2021 18:35) (94% - 96%)    PHYSICAL EXAM:  HEAD:  Atraumatic, Normocephalic  NECK: Supple and normal thyroid.  No JVD or carotid bruit.   HEART: S1, S2 regular , 1/6 soft ejection systolic murmur in mitral area , no thrill and no gallops .  PULMONARY: Bilateral vesicular breathing , few scattered rhonchi and few scattered rales are present .  ABDOMEN: Soft nontender and positive bowl sounds   EXTREMITIES:  No clubbing, cyanosis, or pedal  edema  NEUROLOGICAL: AA and mild confused .  Skin : No rashes .  Musculoskeletal : No joint swellings .    LABS:                        10.4   12.90 )-----------( 351      ( 18 Apr 2021 07:33 )             33.1     04-18    134<L>  |  97  |  64<H>  ----------------------------<  136<H>  4.8   |  25  |  7.30<H>    Ca    9.3      18 Apr 2021 07:33  Phos  7.4     04-17    TPro  x   /  Alb  2.6<L>  /  TBili  x   /  DBili  x   /  AST  x   /  ALT  x   /  AlkPhos  x   04-17    CARDIAC MARKERS ( 17 Apr 2021 08:24 )  .164 ng/mL / x     / x     / x     / x              Assessment/Plan  Patient has :  1) CHF and stable . Acute on chronic diastolic heart failure .  2) Acute CVA and better  .  3) No significant cardiac arrythmia on monitor . Patient is in NSR .  4) ESRD and on hemodialysis   5) Remote one episode of atrial fibrillation more than 3 years ago and during gall bladder surgery as per notes in computer and no recurrence of episode since than as per notes in computer .   6) Anemia   Plan : 1) Cardiac stable so far 2) Full anticoagulation contra indicated at this time due to risk of intra cranial bleed 3) Continue Plavix 4) Monitor hemoglobin and electrolytes . 5) prognosis guarded . 6) Monitor hemoglobin and electrolytes .  Will continue to follow during hospital course and give further recommendations as needed . Thanks for your referral . if any questions please contact me at office ([phone] [phone])

## 2021-04-19 LAB
ALBUMIN SERPL ELPH-MCNC: 2.4 G/DL — LOW (ref 3.3–5)
ANION GAP SERPL CALC-SCNC: 15 MMOL/L — SIGNIFICANT CHANGE UP (ref 5–17)
BASOPHILS # BLD AUTO: 0.04 K/UL — SIGNIFICANT CHANGE UP (ref 0–0.2)
BASOPHILS NFR BLD AUTO: 0.3 % — SIGNIFICANT CHANGE UP (ref 0–2)
BUN SERPL-MCNC: 81 MG/DL — HIGH (ref 7–23)
CALCIUM SERPL-MCNC: 9.1 MG/DL — SIGNIFICANT CHANGE UP (ref 8.5–10.1)
CHLORIDE SERPL-SCNC: 98 MMOL/L — SIGNIFICANT CHANGE UP (ref 96–108)
CO2 SERPL-SCNC: 21 MMOL/L — LOW (ref 22–31)
CREAT SERPL-MCNC: 8.8 MG/DL — HIGH (ref 0.5–1.3)
EOSINOPHIL # BLD AUTO: 0.29 K/UL — SIGNIFICANT CHANGE UP (ref 0–0.5)
EOSINOPHIL NFR BLD AUTO: 2.2 % — SIGNIFICANT CHANGE UP (ref 0–6)
GLUCOSE SERPL-MCNC: 123 MG/DL — HIGH (ref 70–99)
HCT VFR BLD CALC: 34 % — LOW (ref 34.5–45)
HGB BLD-MCNC: 10.4 G/DL — LOW (ref 11.5–15.5)
IMM GRANULOCYTES NFR BLD AUTO: 0.6 % — SIGNIFICANT CHANGE UP (ref 0–1.5)
LYMPHOCYTES # BLD AUTO: 27.4 % — SIGNIFICANT CHANGE UP (ref 13–44)
LYMPHOCYTES # BLD AUTO: 3.55 K/UL — HIGH (ref 1–3.3)
MCHC RBC-ENTMCNC: 28 PG — SIGNIFICANT CHANGE UP (ref 27–34)
MCHC RBC-ENTMCNC: 30.6 GM/DL — LOW (ref 32–36)
MCV RBC AUTO: 91.6 FL — SIGNIFICANT CHANGE UP (ref 80–100)
MONOCYTES # BLD AUTO: 0.96 K/UL — HIGH (ref 0–0.9)
MONOCYTES NFR BLD AUTO: 7.4 % — SIGNIFICANT CHANGE UP (ref 2–14)
NEUTROPHILS # BLD AUTO: 8.03 K/UL — HIGH (ref 1.8–7.4)
NEUTROPHILS NFR BLD AUTO: 62.1 % — SIGNIFICANT CHANGE UP (ref 43–77)
NRBC # BLD: 0 /100 WBCS — SIGNIFICANT CHANGE UP (ref 0–0)
PHOSPHATE SERPL-MCNC: 8.6 MG/DL — HIGH (ref 2.5–4.5)
PLATELET # BLD AUTO: 361 K/UL — SIGNIFICANT CHANGE UP (ref 150–400)
POTASSIUM SERPL-MCNC: 4.7 MMOL/L — SIGNIFICANT CHANGE UP (ref 3.5–5.3)
POTASSIUM SERPL-SCNC: 4.7 MMOL/L — SIGNIFICANT CHANGE UP (ref 3.5–5.3)
RBC # BLD: 3.71 M/UL — LOW (ref 3.8–5.2)
RBC # FLD: 13.6 % — SIGNIFICANT CHANGE UP (ref 10.3–14.5)
SODIUM SERPL-SCNC: 134 MMOL/L — LOW (ref 135–145)
WBC # BLD: 12.95 K/UL — HIGH (ref 3.8–10.5)
WBC # FLD AUTO: 12.95 K/UL — HIGH (ref 3.8–10.5)

## 2021-04-19 RX ORDER — LANOLIN ALCOHOL/MO/W.PET/CERES
5 CREAM (GRAM) TOPICAL AT BEDTIME
Refills: 0 | Status: DISCONTINUED | OUTPATIENT
Start: 2021-04-19 | End: 2021-04-19

## 2021-04-19 RX ADMIN — SODIUM CHLORIDE 1200 MILLILITER(S): 9 INJECTION INTRAMUSCULAR; INTRAVENOUS; SUBCUTANEOUS at 12:25

## 2021-04-19 RX ADMIN — SODIUM CHLORIDE 1200 MILLILITER(S): 9 INJECTION INTRAMUSCULAR; INTRAVENOUS; SUBCUTANEOUS at 10:38

## 2021-04-19 RX ADMIN — CLOPIDOGREL BISULFATE 75 MILLIGRAM(S): 75 TABLET, FILM COATED ORAL at 16:27

## 2021-04-19 RX ADMIN — SODIUM CHLORIDE 1200 MILLILITER(S): 9 INJECTION INTRAMUSCULAR; INTRAVENOUS; SUBCUTANEOUS at 12:20

## 2021-04-19 RX ADMIN — SODIUM CHLORIDE 1200 MILLILITER(S): 9 INJECTION INTRAMUSCULAR; INTRAVENOUS; SUBCUTANEOUS at 10:43

## 2021-04-19 RX ADMIN — HEPARIN SODIUM 5000 UNIT(S): 5000 INJECTION INTRAVENOUS; SUBCUTANEOUS at 16:28

## 2021-04-19 RX ADMIN — Medication 5 MILLIGRAM(S): at 21:49

## 2021-04-19 RX ADMIN — CEFTRIAXONE 100 MILLIGRAM(S): 500 INJECTION, POWDER, FOR SOLUTION INTRAMUSCULAR; INTRAVENOUS at 16:49

## 2021-04-19 RX ADMIN — HEPARIN SODIUM 5000 UNIT(S): 5000 INJECTION INTRAVENOUS; SUBCUTANEOUS at 05:24

## 2021-04-19 RX ADMIN — HALOPERIDOL DECANOATE 1 MILLIGRAM(S): 100 INJECTION INTRAMUSCULAR at 20:05

## 2021-04-19 NOTE — PROGRESS NOTE ADULT - SUBJECTIVE AND OBJECTIVE BOX
[facility], Division of Infectious Diseases  TIKA Martinez Y. Patel, S. [place]  [phone]  ([phone] - weekdays after 5pm and weekends)    Name: IRENE TAYLOR  Age/Gender: 71y Female  MRN: [id number]    Interval History:  Patient seen this morning, resting comfortably.   Notes reviewed. D/w RN. Afebrile.     Allergies: ertapenem (Urticaria)  Purell (Rash)    Objective:  Vitals:   T(F): 98.2 (04-19-21 @ 04:50), Max: 98.2 (04-19-21 @ 04:50)  HR: 79 (04-19-21 @ 04:50) (76 - 86)  BP: 100/61 (04-19-21 @ 04:50) (98/65 - 107/70)  RR: 18 (04-19-21 @ 04:50) (16 - 18)  SpO2: 93% (04-19-21 @ 04:50) (93% - 95%)  Physical Examination:  General: no acute distress, nontoxic appearing  HEENT: normocephalic, atraumatic  Respiratory: no acc muscle use, breathing comfortably  Cardiovascular: S1 S2 present, normal rate  Gastrointestinal: normal appearing, nondistended  Extremities: no edema, no cyanosis  Skin: warm, dry, no visible rash  Lines: PIV    Laboratory Studies:  CBC:                       10.4   12.95 )-----------( 361      ( 19 Apr 2021 06:28 )             34.0     WBC Trend:  12.95 04-19-21 @ 06:28  12.90 04-18-21 @ 07:33  14.27 04-17-21 @ 08:24  16.61 04-15-21 @ 11:49  13.08 04-14-21 @ 09:23  14.41 04-13-21 @ 07:09  13.58 04-12-21 @ 18:08    CMP: 04-18    134<L>  |  97  |  64<H>  ----------------------------<  136<H>  4.8   |  25  |  7.30<H>    Ca    9.3      18 Apr 2021 07:33    Microbiology:  Culture - Blood (collected 04-12-21 @ 15:03)  Source: .Blood Blood-Peripheral  Final Report (04-17-21 @ 15:04):    No Growth Final    Culture - Blood (collected 04-12-21 @ 15:03)  Source: .Blood Blood-Peripheral  Final Report (04-17-21 @ 15:04):    No Growth Final    Radiology: reviewed, no new imaging in past 24h    Medications:  acetaminophen   Tablet .. 650 milliGRAM(s) Oral every 6 hours PRN  albuterol/ipratropium for Nebulization 3 milliLiter(s) Nebulizer every 6 hours  atorvastatin 80 milliGRAM(s) Oral at bedtime  cefTRIAXone   IVPB 1000 milliGRAM(s) IV Intermittent every 24 hours  clopidogrel Tablet 75 milliGRAM(s) Oral daily  haloperidol    Injectable 1 milliGRAM(s) IntraMuscular every 8 hours PRN  heparin   Injectable 5000 Unit(s) SubCutaneous every 8 hours  insulin glargine Injectable (LANTUS) 12 Unit(s) SubCutaneous at bedtime  insulin lispro Injectable (ADMELOG) 5 Unit(s) SubCutaneous before breakfast  insulin lispro Injectable (ADMELOG) 5 Unit(s) SubCutaneous before lunch  insulin lispro Injectable (ADMELOG) 5 Unit(s) SubCutaneous before dinner  metoprolol tartrate 25 milliGRAM(s) Oral two times a day  sodium chloride 0.9% Bolus. 100 milliLiter(s) IV Bolus every 5 minutes PRN    Antimicrobials:  cefTRIAXone   IVPB 1000 milliGRAM(s) IV Intermittent every 24 hours

## 2021-04-19 NOTE — PROGRESS NOTE ADULT - ASSESSMENT
ESRD on HD  Pulmonary edema  Hypertension  Diabetes  Hyperkalemia  Confusion, + CVA    Pt confused. CT results noted. Pt refused HD today. Next HD tomorrow. PO fluid restriction. Pt with very poor out pt compliance with meds and diet. On lokelma.   Monitor blood sugar levels. Insulin coverage as needed. Monitor BP trend. Titrate BP meds as needed. Salt restriction. Neurology follow up.  04/15/21: HD today. Pt unable to recognize me. Pt still with some agitation/confusion. To continue current meds. Neurology follow up. D/w pt's daughter at bedside.   04/16/21: Next HD tomorrow. To continue current meds. Fluid removal as tolerated by BP.   04/19/21: HD today as pt cooperates. Pt still remains confused. neurology follow up.

## 2021-04-19 NOTE — CHART NOTE - NSCHARTNOTEFT_GEN_A_CORE
Called by RN that patient is agitated, pulling off her monitor. Patient seen and examined at bedside. Is sitting up in bed and yelling. Daughter called by RN in an attempt to re-direct patient but was unsuccessful as daughter states patient is "not listening." Patient due for haldol 1mg at this time. Will give now and monitor closely as patient had a RRT earlier in the day for hypotension during dialysis. VS currently stable. If remains agitated, will likely need constant obs. Will keep off monitor as patient without any events during the day. Dr. Mcclure called and made aware. RN made aware. Will call with further updates.

## 2021-04-19 NOTE — CHART NOTE - NSCHARTNOTEFT_GEN_A_CORE
Assessment: Pt seen for nutrition follow-up. Pt asleep during time of visit and writer unable to arouse. Pt receiving HD and is on a Renal Restricted, Dysphagia 3 No Liquid Diet and Glucerna TID. Writer observed breakfast untouched and RN reports pt typically refuses breakfast. Per chart, pt completing 0-100% of meals, most often 25-75% and will eat food brought in from home. Pt's insulin held when she refuses meals. Pt is not on a Consistent CHO diet as recommended per endo and previous nutrition note; blood glucose WNL but slightly elevated at times. Also of note, pt ordered for Glucerna TID and a No Liquid diet; writer will discuss with MD and adjust diet as necessary. Na 134 <L> (4/18), K 4.8 WNL (4/18), and no new phosphate level since 4/17. Will continue to monitor.     Factors impacting intake: [ ] none [ ] nausea  [ ] vomiting [ ] diarrhea [ ] constipation  [ ]chewing problems [ ] swallowing issues  [x ] other: difficulty feeding self, persistent lack of appetite    Diet Prescription: Diet, Renal Restrictions:   For patients receiving Renal Replacement - No Protein Restr, No Conc K, No Conc Phos, Low Sodium  Dysphagia 3, Soft, No Liquids (DYS3)  Supplement Feeding Modality:  Oral  Glucerna Shake Cans or Servings Per Day:  1       Frequency:  Three Times a day (04-15-21 @ 11:40)    Intake: fluctuates, 0-100%    Current Weight: Weight (kg): 92.9kg (4/19), 92.3kg (4/17), 94 (04/12) - potential 1.2% weight loss x 1 week in-house. Will continue to monitor weights.   % Weight Change    Pertinent Medications: MEDICATIONS  (STANDING):  albuterol/ipratropium for Nebulization 3 milliLiter(s) Nebulizer every 6 hours  atorvastatin 80 milliGRAM(s) Oral at bedtime  cefTRIAXone   IVPB 1000 milliGRAM(s) IV Intermittent every 24 hours  clopidogrel Tablet 75 milliGRAM(s) Oral daily  heparin   Injectable 5000 Unit(s) SubCutaneous every 8 hours  insulin glargine Injectable (LANTUS) 12 Unit(s) SubCutaneous at bedtime  insulin lispro Injectable (ADMELOG) 5 Unit(s) SubCutaneous before breakfast  insulin lispro Injectable (ADMELOG) 5 Unit(s) SubCutaneous before lunch  insulin lispro Injectable (ADMELOG) 5 Unit(s) SubCutaneous before dinner  metoprolol tartrate 25 milliGRAM(s) Oral two times a day    MEDICATIONS  (PRN):  acetaminophen   Tablet .. 650 milliGRAM(s) Oral every 6 hours PRN Mild Pain (1 - 3)  haloperidol    Injectable 1 milliGRAM(s) IntraMuscular every 8 hours PRN Agitation  sodium chloride 0.9% Bolus. 100 milliLiter(s) IV Bolus every 5 minutes PRN SBP LESS THAN or EQUAL to 90 mmHg    Pertinent Labs: 04-18 Na134 mmol/L<L> Glu 136 mg/dL<H> K+ 4.8 mmol/L Cr  7.30 mg/dL<H> BUN 64 mg/dL<H> 04-17 Phos 7.4 mg/dL<H> 04-17 Alb 2.6 g/dL<L> 04-14 Chol 101 mg/dL LDL --    HDL 43 mg/dL<L> Trig 83 mg/dL    CAPILLARY BLOOD GLUCOSE      POCT Blood Glucose.: 131 mg/dL (19 Apr 2021 07:47)  POCT Blood Glucose.: 173 mg/dL (18 Apr 2021 21:38)  POCT Blood Glucose.: 183 mg/dL (18 Apr 2021 16:57)  POCT Blood Glucose.: 134 mg/dL (18 Apr 2021 12:06)    Skin: generalized 1+ edema, no pressure injuries noted.    Estimated Needs:   [x] no change since previous assessment  [ ] recalculated:     Previous Nutrition Diagnosis:   [x] Altered Nutrition Related Lab Values    Nutrition Diagnosis is [x] ongoing and being addressed with diet rx [ ] resolved [ ] not applicable     New Nutrition Diagnosis: [x] not applicable       Interventions:   Recommend continue Renal Restricted, Dysphagia 3 No Liquid Diet, discuss Glucerna order with MD, add Consistent CHO+snack diet, obtain and honor food preferences as able, encourage adequate po intake, Monitor pt's PO intake, weight, skin, edema, GI distress   [ ] Change Diet To:  [ ] Nutrition Supplement  [ ] Nutrition Support  [ ] Other:     Monitoring and Evaluation:   [x] PO intake [ x ] Tolerance to diet prescription [ x ] weights [ x ] labs[ x ] follow up per protocol  [ ] other:

## 2021-04-19 NOTE — PROGRESS NOTE ADULT - SUBJECTIVE AND OBJECTIVE BOX
CAPILLARY BLOOD GLUCOSE      POCT Blood Glucose.: 131 mg/dL (19 Apr 2021 07:47)  POCT Blood Glucose.: 173 mg/dL (18 Apr 2021 21:38)  POCT Blood Glucose.: 183 mg/dL (18 Apr 2021 16:57)  POCT Blood Glucose.: 134 mg/dL (18 Apr 2021 12:06)      Vital Signs Last 24 Hrs  T(C): 36.8 (19 Apr 2021 04:50), Max: 36.8 (19 Apr 2021 04:50)  T(F): 98.2 (19 Apr 2021 04:50), Max: 98.2 (19 Apr 2021 04:50)  HR: 79 (19 Apr 2021 04:50) (76 - 86)  BP: 100/61 (19 Apr 2021 04:50) (98/65 - 107/70)  BP(mean): --  RR: 18 (19 Apr 2021 04:50) (16 - 18)  SpO2: 93% (19 Apr 2021 04:50) (93% - 95%)    General: WN/WD NAD  Respiratory: CTA B/L  CV: RRR, S1S2, no murmurs, rubs or gallops  Abdominal: Soft, NT, ND +BS, Last BM  Extremities: No edema, + peripheral pulses     04-18    134<L>  |  97  |  64<H>  ----------------------------<  136<H>  4.8   |  25  |  7.30<H>    Ca    9.3      18 Apr 2021 07:33        atorvastatin 80 milliGRAM(s) Oral at bedtime  insulin glargine Injectable (LANTUS) 12 Unit(s) SubCutaneous at bedtime  insulin lispro Injectable (ADMELOG) 5 Unit(s) SubCutaneous before breakfast  insulin lispro Injectable (ADMELOG) 5 Unit(s) SubCutaneous before lunch  insulin lispro Injectable (ADMELOG) 5 Unit(s) SubCutaneous before dinner

## 2021-04-19 NOTE — PROGRESS NOTE ADULT - SUBJECTIVE AND OBJECTIVE BOX
Neurology follow up note    IRENE [id number]      Interval History:    Patient resting in bed     MEDICATIONS    acetaminophen   Tablet .. 650 milliGRAM(s) Oral every 6 hours PRN  albuterol/ipratropium for Nebulization 3 milliLiter(s) Nebulizer every 6 hours  atorvastatin 80 milliGRAM(s) Oral at bedtime  cefTRIAXone   IVPB 1000 milliGRAM(s) IV Intermittent every 24 hours  clopidogrel Tablet 75 milliGRAM(s) Oral daily  haloperidol    Injectable 1 milliGRAM(s) IntraMuscular every 8 hours PRN  heparin   Injectable 5000 Unit(s) SubCutaneous every 8 hours  insulin glargine Injectable (LANTUS) 12 Unit(s) SubCutaneous at bedtime  insulin lispro Injectable (ADMELOG) 5 Unit(s) SubCutaneous before breakfast  insulin lispro Injectable (ADMELOG) 5 Unit(s) SubCutaneous before lunch  insulin lispro Injectable (ADMELOG) 5 Unit(s) SubCutaneous before dinner  metoprolol tartrate 25 milliGRAM(s) Oral two times a day  sodium chloride 0.9% Bolus. 100 milliLiter(s) IV Bolus every 5 minutes PRN      Allergies    ertapenem (Urticaria)  Purell (Rash)    Intolerances            Vital Signs Last 24 Hrs  T(C): 36.8 (19 Apr 2021 04:50), Max: 36.8 (19 Apr 2021 04:50)  T(F): 98.2 (19 Apr 2021 04:50), Max: 98.2 (19 Apr 2021 04:50)  HR: 79 (19 Apr 2021 04:50) (76 - 86)  BP: 100/61 (19 Apr 2021 04:50) (98/65 - 107/70)  BP(mean): --  RR: 18 (19 Apr 2021 04:50) (16 - 18)  SpO2: 93% (19 Apr 2021 04:50) (93% - 95%)      REVIEW OF SYSTEMS:  Very limited but feels ok no significant numbness in feet     PHYSICAL EXAMINATION:  .  HEENT:  Head:  Normocephalic, atraumatic.  Eyes:  No scleral icterus.  Ears:  Hearing appeared to be intact.  NECK:  Supple.  CARDIOVASCULAR:  S1 and S2 heard.  RESPIRATORY:  Good air entry bilaterally.  ABDOMEN:  Soft, nontender.  EXTREMITIES:  No clubbing or cyanosis were noted.      NEUROLOGIC:  The patient is awake and alert.  Location was home  Able to say daughter's name.  Year and month was unknown.  was able to tell number of finger in each hand   Extraocular movements were intact.  Speech was fluent.  Motor:  Bilateral upper 4/5.  Bilateral lower extremities with plantar stimulation, slight flexation at the hip and knee, would say overall 3-/5.  The patient would not follow complex or simple commands.                 LABS:  CBC Full  -  ( 19 Apr 2021 06:28 )  WBC Count : 12.95 K/uL  RBC Count : 3.71 M/uL  Hemoglobin : 10.4 g/dL  Hematocrit : 34.0 %  Platelet Count - Automated : 361 K/uL  Mean Cell Volume : 91.6 fl  Mean Cell Hemoglobin : 28.0 pg  Mean Cell Hemoglobin Concentration : 30.6 gm/dL  Auto Neutrophil # : 8.03 K/uL  Auto Lymphocyte # : 3.55 K/uL  Auto Monocyte # : 0.96 K/uL  Auto Eosinophil # : 0.29 K/uL  Auto Basophil # : 0.04 K/uL  Auto Neutrophil % : 62.1 %  Auto Lymphocyte % : 27.4 %  Auto Monocyte % : 7.4 %  Auto Eosinophil % : 2.2 %  Auto Basophil % : 0.3 %      04-18    134<L>  |  97  |  64<H>  ----------------------------<  136<H>  4.8   |  25  |  7.30<H>    Ca    9.3      18 Apr 2021 07:33      Hemoglobin A1C:       Vitamin B12         RADIOLOGY      ANALYSIS AND PLAN:  This is a 71-year-old with episode of altered mental status.    For episode of altered mental status, suspect most likely this is metabolic in nature, questionable secondary to any type of hospital induced delirium psychosis  mental status is better    MRI imaging of the brain noted   For history of diabetes, strict control of blood sugars.  For history of neuropathy, the patient does have elevated renal function.  I would recommend Nephrology followup in regards to possibly adjustment dose of the patient's gabapentin decreased to 100 tid  For history of hypertension, monitor systolic blood pressure.  ct chest noted antibiotics as needed   For paroxysmal atrial fibrillation, telemetry evaluation.  Cardiology followup as needed  for CVA will dc asa change to plavix  if patient  does have  AFIB would recommends dc plavix and start AC after 2 weeks total  do to size of CVA  echo No intracardiac mass,thrombus or vegetations and  tumor.  more interactive today   physical therapy as tolerated     Spoke with daughter, Frederick; her telephone number is [phone] 4/19/2021 use her to translate and ask mom questions as per did talk to her "perfectly "      Greater than 37 minutes of time was spent with the patient, plan of care, reviewing data, with greater than 50% of the visit was spent counseling and/or coordinating care with multidisciplinary healthcare team

## 2021-04-19 NOTE — PROGRESS NOTE ADULT - SUBJECTIVE AND OBJECTIVE BOX
[facility]       HPI:  71 yr female with PMHx CAD s/p stents '07, HTN, MI, PAF, liver abscess, s/p biliary stent with removal (11/2018; 7/2/19), chronic pancreatitis, DM2 on insulin, neuropathy, ESRD (5/2018) on HD (M/W/F) who presented this morning (4/12/21) via EMS from home with progressive SOB and found to be in hypoxic resp failure with SPO2 of 83% on Rm Air.      In E.D. pt initially placed on NRB with improvement of SPO2 99% with eventual placement of bipap therapy secondary to increased WOB and hypercapnic resp failure with ph 7.30 PCO2 of 50 serum HCO3 of 26, CXR with bilat opacities. Pt also found to have hyperkalemia of 6.8 without ECG changes, hyperglycemic of 266, WBC of 14.2, BNP 29979. Pt received lokelma 10gms po, Reg insuline 10 units IV x1.        Consult called for hypoxic/hypercapnic resp failure and hyperkalemia secondary to ESRD   (12 Apr 2021 15:40)        SUBJECTIVE:      ALLERGIES:  Allergies    ertapenem (Urticaria)  Purell (Rash)    Intolerances          MEDICATIONS  (STANDING):  albuterol/ipratropium for Nebulization 3 milliLiter(s) Nebulizer every 6 hours  atorvastatin 80 milliGRAM(s) Oral at bedtime  cefTRIAXone   IVPB 1000 milliGRAM(s) IV Intermittent every 24 hours  clopidogrel Tablet 75 milliGRAM(s) Oral daily  heparin   Injectable 5000 Unit(s) SubCutaneous every 8 hours  insulin glargine Injectable (LANTUS) 12 Unit(s) SubCutaneous at bedtime  insulin lispro Injectable (ADMELOG) 5 Unit(s) SubCutaneous before breakfast  insulin lispro Injectable (ADMELOG) 5 Unit(s) SubCutaneous before lunch  insulin lispro Injectable (ADMELOG) 5 Unit(s) SubCutaneous before dinner  metoprolol tartrate 25 milliGRAM(s) Oral two times a day    MEDICATIONS  (PRN):  acetaminophen   Tablet .. 650 milliGRAM(s) Oral every 6 hours PRN Mild Pain (1 - 3)  haloperidol    Injectable 1 milliGRAM(s) IntraMuscular every 8 hours PRN Agitation  sodium chloride 0.9% Bolus. 100 milliLiter(s) IV Bolus every 5 minutes PRN SBP LESS THAN or EQUAL to 90 mmHg      REVIEW OF SYSTEMS:  CONSTITUTIONAL: No fever,  RESPIRATORY: No cough, wheezing, shortness of breath  CARDIOVASCULAR: No chest pain, dyspnea, palpitations, dizziness, syncope, paroxysmal nocturnal dyspnea, orthopnea, or arm or leg swelling  GASTROINTESTINAL: No abdominal  or epigastric pain, nausea, vomiting,  diarrhea  NEUROLOGICAL: No headaches,  loss of strength, numbness, or tremors    Vital Signs Last 24 Hrs  T(C): 36.8 (19 Apr 2021 15:43), Max: 36.8 (19 Apr 2021 04:50)  T(F): 98.3 (19 Apr 2021 15:43), Max: 98.3 (19 Apr 2021 15:43)  HR: 101 (19 Apr 2021 15:43) (68 - 109)  BP: 114/75 (19 Apr 2021 15:43) (72/35 - 150/78)  BP(mean): --  RR: 17 (19 Apr 2021 15:43) (17 - 18)  SpO2: 98% (19 Apr 2021 15:43) (93% - 100%)    PHYSICAL EXAM:  HEAD:  Atraumatic, Normocephalic  NECK: Supple and normal thyroid.  No JVD or carotid bruit.   HEART: S1, S2 regular , 1/6 soft ejection systolic murmur in mitral area , no thrill and no gallops .  PULMONARY: Bilateral vesicular breathing , few scattered ronchi and few scattered rales are present .  ABDOMEN: Soft nontender and positive bowl sounds   EXTREMITIES:  No clubbing, cyanosis, or pedal  edema  NEUROLOGICAL: AAOX3 , no focal deficit .    LABS:                        10.4   12.95 )-----------( 361      ( 19 Apr 2021 06:28 )             34.0     04-19    134<L>  |  98  |  81<H>  ----------------------------<  123<H>  4.7   |  21<L>  |  8.80<H>    Ca    9.1      19 Apr 2021 11:06  Phos  8.6     04-19    TPro  x   /  Alb  2.4<L>  /  TBili  x   /  DBili  x   /  AST  x   /  ALT  x   /  AlkPhos  x   04-19            BNP      EKG:  ECHO:  IMAGING:    Assessment/Plan    Will continue to follow during hospital course and give further recommendations as needed . Thanks for your referral . if any questions please contact me at office ([phone])cell [phone])  YONNY RIVERO MD [facility]  [address]   [address]  OFFICE : [phone]  CELL : [phone]    CARDIOLOGY F/U :      HPI:  71 yr female with PMHx CAD s/p stents '07, HTN, MI, PAF, liver abscess, s/p biliary stent with removal (11/2018; 7/2/19), chronic pancreatitis, DM2 on insulin, neuropathy, ESRD (5/2018) on HD (M/W/F) who presented this morning (4/12/21) via EMS from home with progressive SOB and found to be in hypoxic resp failure with SPO2 of 83% on Rm Air.      In E.D. pt initially placed on NRB with improvement of SPO2 99% with eventual placement of bipap therapy secondary to increased WOB and hypercapnic resp failure with ph 7.30 PCO2 of 50 serum HCO3 of 26, CXR with bilat opacities. Pt also found to have hyperkalemia of 6.8 without ECG changes, hyperglycemic of 266, WBC of 14.2, BNP 36725. Pt received lokelma 10gms po, Reg insuline 10 units IV x1.        Consult called for hypoxic/hypercapnic resp failure and hyperkalemia secondary to ESRD   (12 Apr 2021 15:40)        SUBJECTIVE: Patient mild confused .      ALLERGIES:  Allergies    ertapenem (Urticaria)  Purell (Rash)    Intolerances          MEDICATIONS  (STANDING):  albuterol/ipratropium for Nebulization 3 milliLiter(s) Nebulizer every 6 hours  atorvastatin 80 milliGRAM(s) Oral at bedtime  cefTRIAXone   IVPB 1000 milliGRAM(s) IV Intermittent every 24 hours  clopidogrel Tablet 75 milliGRAM(s) Oral daily  heparin   Injectable 5000 Unit(s) SubCutaneous every 8 hours  insulin glargine Injectable (LANTUS) 12 Unit(s) SubCutaneous at bedtime  insulin lispro Injectable (ADMELOG) 5 Unit(s) SubCutaneous before breakfast  insulin lispro Injectable (ADMELOG) 5 Unit(s) SubCutaneous before lunch  insulin lispro Injectable (ADMELOG) 5 Unit(s) SubCutaneous before dinner  metoprolol tartrate 25 milliGRAM(s) Oral two times a day    MEDICATIONS  (PRN):  acetaminophen   Tablet .. 650 milliGRAM(s) Oral every 6 hours PRN Mild Pain (1 - 3)  haloperidol    Injectable 1 milliGRAM(s) IntraMuscular every 8 hours PRN Agitation  sodium chloride 0.9% Bolus. 100 milliLiter(s) IV Bolus every 5 minutes PRN SBP LESS THAN or EQUAL to 90 mmHg      REVIEW OF SYSTEMS:  CONSTITUTIONAL: No fever,  RESPIRATORY: No cough, wheezing, shortness of breath  CARDIOVASCULAR: No chest pain, dyspnea, palpitations, dizziness, syncope, paroxysmal nocturnal dyspnea, orthopnea, or arm or leg swelling  GASTROINTESTINAL: No abdominal  or epigastric pain, nausea, vomiting,  diarrhea  NEUROLOGICAL: No headaches,  numbness, or tremors  Skin : No itching .  Hematology : No bleeding   Endocrinology : No heat and cold intolerance .  Psychiatry : Patient is confused .  Musculoskeletal : Patient has mild arthritis .      Vital Signs Last 24 Hrs  T(C): 36.8 (19 Apr 2021 15:43), Max: 36.8 (19 Apr 2021 04:50)  T(F): 98.3 (19 Apr 2021 15:43), Max: 98.3 (19 Apr 2021 15:43)  HR: 101 (19 Apr 2021 15:43) (68 - 109)  BP: 114/75 (19 Apr 2021 15:43) (72/35 - 150/78)  BP(mean): --  RR: 17 (19 Apr 2021 15:43) (17 - 18)  SpO2: 98% (19 Apr 2021 15:43) (93% - 100%)    PHYSICAL EXAM:  HEAD:  Atraumatic, Normocephalic  NECK: Supple and normal thyroid.  No JVD or carotid bruit.   HEART: S1, S2 regular , 1/6 soft ejection systolic murmur in mitral area , no thrill and no gallops .  PULMONARY: Bilateral vesicular breathing , few scattered rhonchi and few scattered rales are present .  ABDOMEN: Soft nontender and positive bowl sounds   EXTREMITIES:  No clubbing, cyanosis, or pedal  edema  NEUROLOGICAL: AA and mild confused .,  Skin : No rashes .  Musculoskeletal : No joint swellings .    LABS:                        10.4   12.95 )-----------( 361      ( 19 Apr 2021 06:28 )             34.0     04-19    134<L>  |  98  |  81<H>  ----------------------------<  123<H>  4.7   |  21<L>  |  8.80<H>    Ca    9.1      19 Apr 2021 11:06  Phos  8.6     04-19    TPro  x   /  Alb  2.4<L>  /  TBili  x   /  DBili  x   /  AST  x   /  ALT  x   /  AlkPhos  x   04-19            Assessment/Plan  Patient has :  1) CHF and stable . Acute on chronic diastolic heart failure .  2) Acute CVA and better  .  3) No significant cardiac arrythmia on monitor . Patient is in NSR .  4) ESRD and on hemodialysis   5) Remote one episode of atrial fibrillation more than 3 years ago and during gall bladder surgery as per notes in computer and no recurrence of episode since than as per notes in computer .   6) Anemia   Plan : 1) Cardiac stable so far 2) Full anticoagulation contra indicated at this time due to risk of intra cranial bleed 3) Continue Plavix 4) Monitor hemoglobin and electrolytes . 5) prognosis guarded . 6) Monitor hemoglobin and electrolytes .  Will continue to follow during hospital course and give further recommendations as needed . Thanks for your referral . if any questions please contact me at office ([phone] [phone])

## 2021-04-19 NOTE — CHART NOTE - NSCHARTNOTEFT_GEN_A_CORE
Resident Rapid Response Note    Patient is a 71y old  Female who presents with a chief complaint of sob (18 Apr 2021 12:28)      Rapid response was called on this 71y Female with a PMH HTN, HLD, DM2, ESRD on dialysis, CAD s/p PCI (2007), pAF, anemia of chronic kidney disease, and history of acute cholecystitis/ascending cholangitis with ESBL E. coli bacteremia in 2018 s/p percutanous cholecystostomy and biliary stent s/p removal. Rapid response was called for hypotension, lowest BP reported - 57/24. HD was subsequently held and repeat BP's improved to 125/58 and 135/67.     Patient was seen and examined at the bedside by the rapid response team. ICU PA at bedside and Dr. Albarran at bedside. Pt says she feels okay but is non-participatory with exam. Appears confused and altered, does not wish to answer questions.    Rapid Response Vital Signs:  BP: 91/52  HR: 94  RR: 20  SpO2: on NRB   FS: 116    Vital Signs Last 24 Hrs  T(C): 36.8 (19 Apr 2021 04:50), Max: 36.8 (19 Apr 2021 04:50)  T(F): 98.2 (19 Apr 2021 04:50), Max: 98.2 (19 Apr 2021 04:50)  HR: 79 (19 Apr 2021 04:50) (76 - 86)  BP: 100/61 (19 Apr 2021 04:50) (98/65 - 107/70)  BP(mean): --  RR: 18 (19 Apr 2021 04:50) (16 - 18)  SpO2: 93% (19 Apr 2021 04:50) (93% - 95%)    Physical Exam:  General: elderly female, laying in bed, NAD  HEENT: NCAT, EOMI bl, moist mucous membranes   Neck: Supple, nontender  Neurology: alert but non-participatory with exam, answers some questions appropriatley  Respiratory: decreased breath sounds, on NRB  CV: RRR, S1/S2 present, no murmurs, rubs, or gallops  Abdominal: Soft, nontender, non-distended, normoactive bowel sounds  Extremities: trace b/l LE edema, peripheral pulses present  Skin: warm, dry, normal color    LABS:                        10.4   12.95 )-----------( 361      ( 19 Apr 2021 06:28 )             34.0       Ca    9.3        18 Apr 2021 07:33          CAPILLARY BLOOD GLUCOSE      POCT Blood Glucose.: 116 mg/dL (19 Apr 2021 10:44)  POCT Blood Glucose.: 131 mg/dL (19 Apr 2021 07:47)  POCT Blood Glucose.: 173 mg/dL (18 Apr 2021 21:38)  POCT Blood Glucose.: 183 mg/dL (18 Apr 2021 16:57)  POCT Blood Glucose.: 134 mg/dL (18 Apr 2021 12:06)        RADIOLOGY & ADDITIONAL TESTS:      Assessment/Plan:  71F PMH HTN, HLD, DM2, ESRD on dialysis, CAD s/p PCI (2007), pAF, anemia of chronic kidney disease, and history of acute cholecystitis/ascending cholangitis with ESBL E. coli bacteremia in 2018 s/p percutanous cholecystostomy and biliary stent s/p removal who presented with worsening dyspnea, admitted for acute hypoxemic respiratory failure 2/2 to esrd and altered mental status, rapid response called for hypotension during hemodialysis which improved    Plan:   - Hypotension improved with repeat /67  - Per nephro carly [place] to resume HD   - Will monitor hemodynamics closely and hold if becomes hypotensive   - Will place call out to Dr. Black who is covering for Dr. Mcclure today  - Will continue to follow, RN to call if any changes. Resident Rapid Response Note    Patient is a 71y old  Female who presents with a chief complaint of sob (18 Apr 2021 12:28)      Rapid response was called on this 71y Female with a PMH HTN, HLD, DM2, ESRD on dialysis, CAD s/p PCI (2007), pAF, anemia of chronic kidney disease, and history of acute cholecystitis/ascending cholangitis with ESBL E. coli bacteremia in 2018 s/p percutanous cholecystostomy and biliary stent s/p removal. Rapid response was called for hypotension, lowest BP reported - 57/24. HD was subsequently held and repeat BP's improved to 125/58 and 133/67.     Patient was seen and examined at the bedside by the rapid response team. ICU PA at bedside and Dr. Albarran at bedside. Pt says she feels okay but is non-participatory with exam. Appears confused and altered, does not wish to answer questions.    Rapid Response Vital Signs:  BP: 91/52  HR: 94  RR: 20  SpO2: on NRB   FS: 116    Vital Signs Last 24 Hrs  T(C): 36.8 (19 Apr 2021 04:50), Max: 36.8 (19 Apr 2021 04:50)  T(F): 98.2 (19 Apr 2021 04:50), Max: 98.2 (19 Apr 2021 04:50)  HR: 79 (19 Apr 2021 04:50) (76 - 86)  BP: 100/61 (19 Apr 2021 04:50) (98/65 - 107/70)  BP(mean): --  RR: 18 (19 Apr 2021 04:50) (16 - 18)  SpO2: 93% (19 Apr 2021 04:50) (93% - 95%)    Physical Exam:  General: elderly female, laying in bed, NAD  HEENT: NCAT, EOMI bl, moist mucous membranes   Neck: Supple, nontender  Neurology: alert but non-participatory with exam, answers some questions appropriatley  Respiratory: decreased breath sounds, on NRB  CV: RRR, S1/S2 present, no murmurs, rubs, or gallops  Abdominal: Soft, nontender, non-distended, normoactive bowel sounds  Extremities: trace b/l LE edema, peripheral pulses present  Skin: warm, dry, normal color    LABS:                        10.4   12.95 )-----------( 361      ( 19 Apr 2021 06:28 )             34.0       Ca    9.3        18 Apr 2021 07:33          CAPILLARY BLOOD GLUCOSE      POCT Blood Glucose.: 116 mg/dL (19 Apr 2021 10:44)  POCT Blood Glucose.: 131 mg/dL (19 Apr 2021 07:47)  POCT Blood Glucose.: 173 mg/dL (18 Apr 2021 21:38)  POCT Blood Glucose.: 183 mg/dL (18 Apr 2021 16:57)  POCT Blood Glucose.: 134 mg/dL (18 Apr 2021 12:06)        RADIOLOGY & ADDITIONAL TESTS:      Assessment/Plan:  71F PMH HTN, HLD, DM2, ESRD on dialysis, CAD s/p PCI (2007), pAF, anemia of chronic kidney disease, and history of acute cholecystitis/ascending cholangitis with ESBL E. coli bacteremia in 2018 s/p percutanous cholecystostomy and biliary stent s/p removal who presented with worsening dyspnea, admitted for acute hypoxemic respiratory failure 2/2 to esrd and altered mental status, rapid response called for hypotension during hemodialysis which improved    Plan:   - Hypotension improved with repeat /67  - Per nephro carly [place] to resume HD   - Will monitor hemodynamics closely and hold if becomes hypotensive   - Will place call out to Dr. Black who is covering for Dr. Mcclure today  - Will continue to follow, RN to call if any changes. Resident Rapid Response Note    Patient is a 71y old  Female who presents with a chief complaint of sob (18 Apr 2021 12:28)      Rapid response was called on this 71y Female with a PMH HTN, HLD, DM2, ESRD on dialysis, CAD s/p PCI (2007), pAF, anemia of chronic kidney disease, and history of acute cholecystitis/ascending cholangitis with ESBL E. coli bacteremia in 2018 s/p percutanous cholecystostomy and biliary stent s/p removal. Rapid response was called for hypotension, lowest BP reported - 57/24. HD was subsequently held and BP cuff was readjusted and repeat BP's improved to 125/58 and 133/67.     Patient was seen and examined at the bedside by the rapid response team. ICU PA at bedside and Dr. Albarran at bedside. Pt says she feels okay but is non-participatory with exam. Appears confused and altered, does not wish to answer questions.    Rapid Response Vital Signs:  BP: 91/52  HR: 94  RR: 20  SpO2: on NRB   FS: 116    Vital Signs Last 24 Hrs  T(C): 36.8 (19 Apr 2021 04:50), Max: 36.8 (19 Apr 2021 04:50)  T(F): 98.2 (19 Apr 2021 04:50), Max: 98.2 (19 Apr 2021 04:50)  HR: 79 (19 Apr 2021 04:50) (76 - 86)  BP: 100/61 (19 Apr 2021 04:50) (98/65 - 107/70)  BP(mean): --  RR: 18 (19 Apr 2021 04:50) (16 - 18)  SpO2: 93% (19 Apr 2021 04:50) (93% - 95%)    Physical Exam:  General: elderly female, laying in bed, NAD  HEENT: NCAT, EOMI bl, moist mucous membranes   Neck: Supple, nontender  Neurology: alert but non-participatory with exam, answers some questions appropriatley  Respiratory: decreased breath sounds, on NRB  CV: RRR, S1/S2 present, no murmurs, rubs, or gallops  Abdominal: Soft, nontender, non-distended, normoactive bowel sounds  Extremities: trace b/l LE edema, peripheral pulses present  Skin: warm, dry, normal color    LABS:                        10.4   12.95 )-----------( 361      ( 19 Apr 2021 06:28 )             34.0       Ca    9.3        18 Apr 2021 07:33          CAPILLARY BLOOD GLUCOSE      POCT Blood Glucose.: 116 mg/dL (19 Apr 2021 10:44)  POCT Blood Glucose.: 131 mg/dL (19 Apr 2021 07:47)  POCT Blood Glucose.: 173 mg/dL (18 Apr 2021 21:38)  POCT Blood Glucose.: 183 mg/dL (18 Apr 2021 16:57)  POCT Blood Glucose.: 134 mg/dL (18 Apr 2021 12:06)        RADIOLOGY & ADDITIONAL TESTS:      Assessment/Plan:  71F PMH HTN, HLD, DM2, ESRD on dialysis, CAD s/p PCI (2007), pAF, anemia of chronic kidney disease, and history of acute cholecystitis/ascending cholangitis with ESBL E. coli bacteremia in 2018 s/p percutanous cholecystostomy and biliary stent s/p removal who presented with worsening dyspnea, admitted for acute hypoxemic respiratory failure 2/2 to esrd and altered mental status, rapid response called for hypotension during hemodialysis which improved    Plan:   - Hypotension improved with repeat /67  - Per nephro carly [place] to resume hemodialysis   - Will monitor hemodynamics closely - hold if becomes hypotensive   - Will place call out to Dr. Black who is covering for Dr. Mcclure today  - Will continue to follow, RN to call if any changes. Resident Rapid Response Note    Patient is a 71y old  Female who presents with a chief complaint of sob (18 Apr 2021 12:28)      Rapid response was called on this 71y Female with a PMH HTN, HLD, DM2, ESRD on dialysis, CAD s/p PCI (2007), pAF, anemia of chronic kidney disease, and history of acute cholecystitis/ascending cholangitis with ESBL E. coli bacteremia in 2018 s/p percutanous cholecystostomy and biliary stent s/p removal. Rapid response was called for hypotension, lowest BP reported - 57/24. HD was subsequently held and BP cuff was readjusted and repeat BP's improved to 125/58 and 133/67.     Patient was seen and examined at the bedside by the rapid response team. ICU PA at bedside and Dr. Albarran at bedside. Pt says she feels okay but is non-participatory with exam. Appears confused and altered, does not wish to answer questions.    Rapid Response Vital Signs:  BP: 91/52  HR: 94  RR: 20  SpO2: on NRB   FS: 116    Vital Signs Last 24 Hrs  T(C): 36.8 (19 Apr 2021 04:50), Max: 36.8 (19 Apr 2021 04:50)  T(F): 98.2 (19 Apr 2021 04:50), Max: 98.2 (19 Apr 2021 04:50)  HR: 79 (19 Apr 2021 04:50) (76 - 86)  BP: 100/61 (19 Apr 2021 04:50) (98/65 - 107/70)  BP(mean): --  RR: 18 (19 Apr 2021 04:50) (16 - 18)  SpO2: 93% (19 Apr 2021 04:50) (93% - 95%)    Physical Exam:  General: elderly female, laying in bed, NAD  HEENT: NCAT, EOMI bl, moist mucous membranes   Neck: Supple, nontender  Neurology: alert but non-participatory with exam, answers some questions appropriately  Respiratory: decreased breath sounds, on NRB  CV: RRR, S1/S2 present, no murmurs, rubs, or gallops  Abdominal: Soft, nontender, non-distended, normoactive bowel sounds  Extremities: trace b/l LE edema, peripheral pulses present  Skin: warm, dry, normal color    LABS:                        10.4   12.95 )-----------( 361      ( 19 Apr 2021 06:28 )             34.0       Ca    9.3        18 Apr 2021 07:33          CAPILLARY BLOOD GLUCOSE      POCT Blood Glucose.: 116 mg/dL (19 Apr 2021 10:44)  POCT Blood Glucose.: 131 mg/dL (19 Apr 2021 07:47)  POCT Blood Glucose.: 173 mg/dL (18 Apr 2021 21:38)  POCT Blood Glucose.: 183 mg/dL (18 Apr 2021 16:57)  POCT Blood Glucose.: 134 mg/dL (18 Apr 2021 12:06)        RADIOLOGY & ADDITIONAL TESTS:      Assessment/Plan:  71F PMH HTN, HLD, DM2, ESRD on dialysis, CAD s/p PCI (2007), pAF, anemia of chronic kidney disease, and history of acute cholecystitis/ascending cholangitis with ESBL E. coli bacteremia in 2018 s/p percutanous cholecystostomy and biliary stent s/p removal who presented with worsening dyspnea, admitted for acute hypoxemic respiratory failure 2/2 to esrd and altered mental status, rapid response called for hypotension during hemodialysis which improved    Plan:   - Hypotension improved with repeat /67  - Per nephro carly [place] to resume hemodialysis   - Will monitor hemodynamics closely - hold if becomes hypotensive   - Dr. Black notified, covering for Dr. Mcclure today  - Will continue to follow, RN to call if any changes. Resident Rapid Response Note    Patient is a 71y old  Female who presents with a chief complaint of sob (18 Apr 2021 12:28)      Rapid response was called on this 71y Female with a PMH HTN, HLD, DM2, ESRD on dialysis, CAD s/p PCI (2007), pAF, anemia of chronic kidney disease, and history of acute cholecystitis/ascending cholangitis with ESBL E. coli bacteremia in 2018 s/p percutanous cholecystostomy and biliary stent s/p removal. Rapid response was called for hypotension, lowest BP reported - 57/24. HD was subsequently held and BP cuff was readjusted and repeat BP's improved to 125/58 and 133/67.     Patient was seen and examined at the bedside by the rapid response team. ICU PA at bedside and Dr. Albarran at bedside. Pt says she feels okay but is non-participatory with exam. Appears confused and altered, does not wish to answer questions.    Rapid Response Vital Signs:  BP: 91/52  HR: 94  RR: 20  SpO2: on NRB   FS: 116    Vital Signs Last 24 Hrs  T(C): 36.8 (19 Apr 2021 04:50), Max: 36.8 (19 Apr 2021 04:50)  T(F): 98.2 (19 Apr 2021 04:50), Max: 98.2 (19 Apr 2021 04:50)  HR: 79 (19 Apr 2021 04:50) (76 - 86)  BP: 100/61 (19 Apr 2021 04:50) (98/65 - 107/70)  BP(mean): --  RR: 18 (19 Apr 2021 04:50) (16 - 18)  SpO2: 93% (19 Apr 2021 04:50) (93% - 95%)    Physical Exam:  General: elderly female, laying in bed, NAD  HEENT: NCAT, EOMI bl, moist mucous membranes   Neck: Supple, nontender  Neurology: alert but non-participatory with exam, answers some questions appropriately  Respiratory: decreased breath sounds, on NRB  CV: RRR, S1/S2 present, no murmurs, rubs, or gallops  Abdominal: Soft, nontender, non-distended, normoactive bowel sounds  Extremities: trace b/l LE edema, peripheral pulses present  Skin: warm, dry, normal color    LABS:                        10.4   12.95 )-----------( 361      ( 19 Apr 2021 06:28 )             34.0       Ca    9.3        18 Apr 2021 07:33          CAPILLARY BLOOD GLUCOSE      POCT Blood Glucose.: 116 mg/dL (19 Apr 2021 10:44)  POCT Blood Glucose.: 131 mg/dL (19 Apr 2021 07:47)  POCT Blood Glucose.: 173 mg/dL (18 Apr 2021 21:38)  POCT Blood Glucose.: 183 mg/dL (18 Apr 2021 16:57)  POCT Blood Glucose.: 134 mg/dL (18 Apr 2021 12:06)        RADIOLOGY & ADDITIONAL TESTS:      Assessment/Plan:  71F PMH HTN, HLD, DM2, ESRD on dialysis, CAD s/p PCI (2007), pAF, anemia of chronic kidney disease, and history of acute cholecystitis/ascending cholangitis with ESBL E. coli bacteremia in 2018 s/p percutanous cholecystostomy and biliary stent s/p removal who presented with worsening dyspnea, admitted for acute hypoxemic respiratory failure 2/2 to esrd and altered mental status, rapid response called for hypotension during hemodialysis which improved    Plan:   - Hypotension improved with repeat /67  - Per nephro carly [place] to resume hemodialysis   - Will monitor hemodynamics closely - hold if becomes hypotensive   - Dr. Black notified, covering for Dr. Mcclure today  - Will continue to follow, RN to call if any changes.        Addendum: Rapid response follow up     Pt seen 2 hours post rapid response in dialysis suite. Per RN, hemodialysis stopped early after 1 hour and 50 minutes because patient became irritable and did not want to proceed further. During hemodialysis after the rapid response, blood pressure was monitored closely. Pt's BPs were stable although had one episode of hypotension which resolved after repeat.     Pt seen and examined at bedside. Pt is awake but is irritable and is non-participatory with exam. She denies chest pain, dizziness, shortness of breath, but does not wish to provide additional history or review of systems upon questioning.     Physical exam:     Documented vital signs per dialysis RN:     BP: 120/70  HR: 109  T: 97.8  SpO2: 98% on RA  R: 18    General: elderly female, laying in bed, NAD, appears irritable  HEENT: NCAT, EOMI bl  Neck: Supple, nontender  Neurology: alert but non-participatory with exam, answers some questions appropriately, irritable  Respiratory: decreased breath sounds, on room air  CV: RRR, S1/S2 present, no murmurs, rubs, or gallops  Abdominal: Soft, nontender, non-distended, normoactive bowel sounds  Extremities: trace b/l LE edema, peripheral pulses present  Skin: warm, dry, normal color    A/P:   71F PMH HTN, HLD, DM2, ESRD on dialysis, CAD s/p PCI (2007), pAF, anemia of chronic kidney disease, and history of acute cholecystitis/ascending cholangitis with ESBL E. coli bacteremia in 2018 s/p percutanous cholecystostomy and biliary stent s/p removal who presented with worsening dyspnea, admitted for acute hypoxemic respiratory failure 2/2 to esrd and altered mental status, rapid response called earlier for hypotension during hemodialysis has improved. Pt currently s/p dialysis which ended earlier due to pt irritability/refusal. Hemodynamically stable at this time    Plan:   - Most recent /70 per dialysis RN  - pt is s/p hemodialysis although did not complete full session 2/2 to irritability and agitation   - Pt is hemodynamically stable at this time  - Will continue to monitor hemodynamics, rn to notify of change

## 2021-04-19 NOTE — PROGRESS NOTE ADULT - SUBJECTIVE AND OBJECTIVE BOX
Patient is a 71y old  Female who presents with a chief complaint of   Patient seen in follow up for ESRD on HD, SOB.        PAST MEDICAL HISTORY:  Diabetes    Hypertension    Myocardial infarction    Pneumonia    Hypertension    CRF (chronic renal failure)    Diabetes    CAD (coronary artery disease)    ESRD (end stage renal disease) on dialysis    Liver abscess    Acute urinary retention    Cholecystitis with cholangitis    Chronic pancreatitis    PAF (paroxysmal atrial fibrillation)    H/O diabetic neuropathy      MEDICATIONS  (STANDING):  albuterol/ipratropium for Nebulization 3 milliLiter(s) Nebulizer every 6 hours  atorvastatin 80 milliGRAM(s) Oral at bedtime  cefTRIAXone   IVPB 1000 milliGRAM(s) IV Intermittent every 24 hours  clopidogrel Tablet 75 milliGRAM(s) Oral daily  heparin   Injectable 5000 Unit(s) SubCutaneous every 8 hours  insulin glargine Injectable (LANTUS) 12 Unit(s) SubCutaneous at bedtime  insulin lispro Injectable (ADMELOG) 5 Unit(s) SubCutaneous before breakfast  insulin lispro Injectable (ADMELOG) 5 Unit(s) SubCutaneous before lunch  insulin lispro Injectable (ADMELOG) 5 Unit(s) SubCutaneous before dinner  metoprolol tartrate 25 milliGRAM(s) Oral two times a day    MEDICATIONS  (PRN):  acetaminophen   Tablet .. 650 milliGRAM(s) Oral every 6 hours PRN Mild Pain (1 - 3)  haloperidol    Injectable 1 milliGRAM(s) IntraMuscular every 8 hours PRN Agitation  sodium chloride 0.9% Bolus. 100 milliLiter(s) IV Bolus every 5 minutes PRN SBP LESS THAN or EQUAL to 90 mmHg    T(C): 36.8 (04-19-21 @ 15:43), Max: 36.9 (04-17-21 @ 16:50)  HR: 101 (04-19-21 @ 15:43) (72 - 102)  BP: 114/75 (04-19-21 @ 15:43) (98/65 - 166/85)  RR: 17 (04-19-21 @ 15:43)  SpO2: 98% (04-19-21 @ 15:43)  Wt(kg): --  I&O's Detail    19 Apr 2021 07:01  -  19 Apr 2021 16:16  --------------------------------------------------------  IN:    Other (mL): 686 mL  Total IN: 686 mL    OUT:  Total OUT: 0 mL    Total NET: 686 mL          PHYSICAL EXAM:  General: No distress  Respiratory: b/l air entry  Cardiovascular: S1 S2  Gastrointestinal: soft  Extremities:  edema, left AVF                            LABORATORY:                        10.4   12.95 )-----------( 361      ( 19 Apr 2021 06:28 )             34.0     04-19    134<L>  |  98  |  81<H>  ----------------------------<  123<H>  4.7   |  21<L>  |  8.80<H>    Ca    9.1      19 Apr 2021 11:06  Phos  8.6     04-19    TPro  x   /  Alb  2.4<L>  /  TBili  x   /  DBili  x   /  AST  x   /  ALT  x   /  AlkPhos  x   04-19    Sodium, Serum: 134 mmol/L (04-19 @ 11:06)  Sodium, Serum: 134 mmol/L (04-18 @ 07:33)    Potassium, Serum: 4.7 mmol/L (04-19 @ 11:06)  Potassium, Serum: 4.8 mmol/L (04-18 @ 07:33)    Hemoglobin: 10.4 g/dL (04-19 @ 06:28)  Hemoglobin: 10.4 g/dL (04-18 @ 07:33)  Hemoglobin: 10.8 g/dL (04-17 @ 08:24)    Creatinine, Serum 8.80 (04-19 @ 11:06)  Creatinine, Serum 7.30 (04-18 @ 07:33)  Creatinine, Serum 7.50 (04-17 @ 08:24)        LIVER FUNCTIONS - ( 19 Apr 2021 11:06 )  Alb: 2.4 g/dL / Pro: x     / ALK PHOS: x     / ALT: x     / AST: x     / GGT: x

## 2021-04-19 NOTE — PROGRESS NOTE ADULT - SUBJECTIVE AND OBJECTIVE BOX
Patient is a 71y old  Female who presents with a chief complaint of CHF (16 Apr 2021 20:19)      INTERVAL /OVERNIGHT EVENTS: agitated intermitently, non functioning HD    MEDICATIONS  (STANDING):  albuterol/ipratropium for Nebulization 3 milliLiter(s) Nebulizer every 6 hours  atorvastatin 80 milliGRAM(s) Oral at bedtime  cefTRIAXone   IVPB 1000 milliGRAM(s) IV Intermittent every 24 hours  clopidogrel Tablet 75 milliGRAM(s) Oral daily  heparin   Injectable 5000 Unit(s) SubCutaneous every 8 hours  insulin glargine Injectable (LANTUS) 12 Unit(s) SubCutaneous at bedtime  insulin lispro Injectable (ADMELOG) 5 Unit(s) SubCutaneous before breakfast  insulin lispro Injectable (ADMELOG) 5 Unit(s) SubCutaneous before lunch  insulin lispro Injectable (ADMELOG) 5 Unit(s) SubCutaneous before dinner  metoprolol tartrate 25 milliGRAM(s) Oral two times a day  sodium zirconium cyclosilicate 10 Gram(s) Oral every 8 hours    MEDICATIONS  (PRN):  acetaminophen   Tablet .. 650 milliGRAM(s) Oral every 6 hours PRN Mild Pain (1 - 3)  sodium chloride 0.9% Bolus. 100 milliLiter(s) IV Bolus every 5 minutes PRN SBP LESS THAN or EQUAL to 90 mmHg    Allergies    ertapenem (Urticaria)  Purell (Rash)    Intolerances    REVIEW OF SYSTEMS:  unable to obtain    Vital Signs Last 24 Hrs  T(C): 36.8 (19 Apr 2021 15:43), Max: 36.8 (19 Apr 2021 04:50)  T(F): 98.3 (19 Apr 2021 15:43), Max: 98.3 (19 Apr 2021 15:43)  HR: 101 (19 Apr 2021 15:43) (68 - 109)  BP: 114/75 (19 Apr 2021 15:43) (72/35 - 150/78)  RR: 17 (19 Apr 2021 15:43) (17 - 18)  SpO2: 98% (19 Apr 2021 15:43) (93% - 100%)    PHYSICAL EXAM:  GENERAL: NAD, well-groomed, well-developed  HEAD:  Atraumatic, Normocephalic  EYES: EOMI, PERRLA, conjunctiva and sclera clear  ENMT: No tonsillar erythema, exudates, or enlargement; Moist mucous membranes, Good dentition, No lesions  NECK: Supple, No JVD, Normal thyroid  NERVOUS SYSTEM:  Alert & awake; Motor Strength 5/5 B/L upper and lower extremities; DTRs 2+ intact and symmetric  CHEST/LUNG: Clear to auscultation bilaterally; No rales, rhonchi, wheezing, or rubs  HEART: Regular rate and rhythm; No murmurs, rubs, or gallops  ABDOMEN: Soft, Nontender, Nondistended; Bowel sounds present  EXTREMITIES:  2+ Peripheral Pulses, No clubbing, cyanosis, or edema  LYMPH: No lymphadenopathy noted  SKIN: No rashes or lesions    LABS:                        10.4   12.95 )-----------( 361      ( 19 Apr 2021 06:28 )             34.0     19 Apr 2021 11:06    134    |  98     |  81     ----------------------------<  123    4.7     |  21     |  8.80     Ca    9.1        19 Apr 2021 11:06  Phos  8.6       19 Apr 2021 11:06    TPro  x      /  Alb  2.4    /  TBili  x      /  DBili  x      /  AST  x      /  ALT  x      /  AlkPhos  x      19 Apr 2021 11:06    LIVER FUNCTIONS - ( 19 Apr 2021 11:06 )  Alb: 2.4 g/dL / Pro: x     / ALK PHOS: x     / ALT: x     / AST: x     / GGT: x             CAPILLARY BLOOD GLUCOSE      POCT Blood Glucose.: 136 mg/dL (19 Apr 2021 21:44)  POCT Blood Glucose.: 122 mg/dL (19 Apr 2021 17:21)  POCT Blood Glucose.: 119 mg/dL (19 Apr 2021 13:15)  POCT Blood Glucose.: 116 mg/dL (19 Apr 2021 10:44)  POCT Blood Glucose.: 131 mg/dL (19 Apr 2021 07:47)                              10.4   12.90 )-----------( 351      ( 18 Apr 2021 07:33 )             33.1     18 Apr 2021 07:33    134    |  97     |  64     ----------------------------<  136    4.8     |  25     |  7.30     Ca    9.3        18 Apr 2021 07:33  Phos  7.4       17 Apr 2021 08:24    TPro  x      /  Alb  2.6    /  TBili  x      /  DBili  x      /  AST  x      /  ALT  x      /  AlkPhos  x      17 Apr 2021 08:24    LIVER FUNCTIONS - ( 17 Apr 2021 08:24 )  Alb: 2.6 g/dL / Pro: x     / ALK PHOS: x     / ALT: x     / AST: x     / GGT: x             CAPILLARY BLOOD GLUCOSE      POCT Blood Glucose.: 134 mg/dL (18 Apr 2021 12:06)  POCT Blood Glucose.: 138 mg/dL (18 Apr 2021 07:41)  POCT Blood Glucose.: 203 mg/dL (17 Apr 2021 21:15)  POCT Blood Glucose.: 109 mg/dL (17 Apr 2021 16:33)    CARDIAC MARKERS ( 17 Apr 2021 08:24 )  .164 ng/mL / x     / x     / x     / x                                10.8   14.27 )-----------( 315      ( 17 Apr 2021 08:24 )             34.0     17 Apr 2021 08:24    135    |  98     |  56     ----------------------------<  85     5.6     |  25     |  7.50     Ca    9.2        17 Apr 2021 08:24  Phos  7.4       17 Apr 2021 08:24    TPro  x      /  Alb  2.6    /  TBili  x      /  DBili  x      /  AST  x      /  ALT  x      /  AlkPhos  x      17 Apr 2021 08:24        CAPILLARY BLOOD GLUCOSE      POCT Blood Glucose.: 109 mg/dL (17 Apr 2021 16:33)  POCT Blood Glucose.: 73 mg/dL (17 Apr 2021 12:15)  POCT Blood Glucose.: 123 mg/dL (17 Apr 2021 10:21)  POCT Blood Glucose.: 107 mg/dL (17 Apr 2021 08:00)  POCT Blood Glucose.: 121 mg/dL (16 Apr 2021 21:23)      RADIOLOGY & ADDITIONAL TESTS:    Notes Reviewed:  [ x] YES  [ ] NO    Care Discussed with Consultants/Other Providers [x ] YES  [ ] NO

## 2021-04-19 NOTE — PROGRESS NOTE ADULT - SUBJECTIVE AND OBJECTIVE BOX
Patient is a 71y old  Female who presents with a chief complaint of sob (18 Apr 2021 12:28)      INTERVAL HPI/OVERNIGHT EVENTS:    agitated intermittently. remains confused    MEDICATIONS  (STANDING):  albuterol/ipratropium for Nebulization 3 milliLiter(s) Nebulizer every 6 hours  atorvastatin 80 milliGRAM(s) Oral at bedtime  cefTRIAXone   IVPB 1000 milliGRAM(s) IV Intermittent every 24 hours  clopidogrel Tablet 75 milliGRAM(s) Oral daily  heparin   Injectable 5000 Unit(s) SubCutaneous every 8 hours  insulin glargine Injectable (LANTUS) 12 Unit(s) SubCutaneous at bedtime  insulin lispro Injectable (ADMELOG) 5 Unit(s) SubCutaneous before breakfast  insulin lispro Injectable (ADMELOG) 5 Unit(s) SubCutaneous before lunch  insulin lispro Injectable (ADMELOG) 5 Unit(s) SubCutaneous before dinner  melatonin 5 milliGRAM(s) Oral at bedtime  metoprolol tartrate 25 milliGRAM(s) Oral two times a day      MEDICATIONS  (PRN):  acetaminophen   Tablet .. 650 milliGRAM(s) Oral every 6 hours PRN Mild Pain (1 - 3)  haloperidol    Injectable 1 milliGRAM(s) IntraMuscular every 8 hours PRN Agitation  sodium chloride 0.9% Bolus. 100 milliLiter(s) IV Bolus every 5 minutes PRN SBP LESS THAN or EQUAL to 90 mmHg      Allergies    ertapenem (Urticaria)  Purell (Rash)    Intolerances        PAST MEDICAL & SURGICAL HISTORY:  Hypertension    Myocardial infarction  2007    Diabetes  Type 2 DM    CAD (coronary artery disease)  s/p stent x 1 in 2007    ESRD (end stage renal disease) on dialysis  MWF since 05/2018    Liver abscess  resolved    Acute urinary retention    Cholecystitis with cholangitis    Chronic pancreatitis    PAF (paroxysmal atrial fibrillation)  1 episode in 11/2018 while hospitalized for acute cholecystitis    H/O diabetic neuropathy    H/O: hysterectomy  1990    History of biliary stent insertion  11/2018 &amp; removal 7/2/19    S/P arteriovenous (AV) fistula creation  05/17/2019    Cataract  IOL b/l        Vital Signs Last 24 Hrs  T(C): 36.8 (19 Apr 2021 15:43), Max: 36.8 (19 Apr 2021 04:50)  T(F): 98.3 (19 Apr 2021 15:43), Max: 98.3 (19 Apr 2021 15:43)  HR: 101 (19 Apr 2021 15:43) (68 - 109)  BP: 114/75 (19 Apr 2021 15:43) (72/35 - 150/78)  BP(mean): --  RR: 17 (19 Apr 2021 15:43) (17 - 18)  SpO2: 98% (19 Apr 2021 15:43) (93% - 100%)    PHYSICAL EXAMINATION:    GENERAL: The patient is agitated but no respiratory distress     HEENT: Head is normocephalic and atraumatic. Extraocular muscles are intact. Mucous membranes are moist.    NECK: Supple.    LUNGS: Clear to auscultation without wheezing, rales or rhonchi; respirations unlabored    HEART: Regular rate and rhythm without murmur.    ABDOMEN: Soft, nontender, and nondistended.      EXTREMITIES: Without any cyanosis, clubbing, rash, lesions or edema.    NEUROLOGIC: Grossly intact.    SKIN: No ulceration or induration present.      LABS:                        10.4   12.95 )-----------( 361      ( 19 Apr 2021 06:28 )             34.0     04-19    134<L>  |  98  |  81<H>  ----------------------------<  123<H>  4.7   |  21<L>  |  8.80<H>    Ca    9.1      19 Apr 2021 11:06  Phos  8.6     04-19    TPro  x   /  Alb  2.4<L>  /  TBili  x   /  DBili  x   /  AST  x   /  ALT  x   /  AlkPhos  x   04-19          Assessment:    Acute Hypoxic Respiratory failure  S/P CVA  ESRD  Coronary artery disease - S/P stent  Diabetes type 2    Plan:    oxygen PRN  Duoneb QID  IV Rocephin as per ID pending cultures

## 2021-04-20 LAB
ANION GAP SERPL CALC-SCNC: 13 MMOL/L — SIGNIFICANT CHANGE UP (ref 5–17)
BASOPHILS # BLD AUTO: 0.04 K/UL — SIGNIFICANT CHANGE UP (ref 0–0.2)
BASOPHILS NFR BLD AUTO: 0.3 % — SIGNIFICANT CHANGE UP (ref 0–2)
BUN SERPL-MCNC: 70 MG/DL — HIGH (ref 7–23)
CALCIUM SERPL-MCNC: 9.1 MG/DL — SIGNIFICANT CHANGE UP (ref 8.5–10.1)
CHLORIDE SERPL-SCNC: 99 MMOL/L — SIGNIFICANT CHANGE UP (ref 96–108)
CO2 SERPL-SCNC: 24 MMOL/L — SIGNIFICANT CHANGE UP (ref 22–31)
CREAT SERPL-MCNC: 8 MG/DL — HIGH (ref 0.5–1.3)
EOSINOPHIL # BLD AUTO: 0.14 K/UL — SIGNIFICANT CHANGE UP (ref 0–0.5)
EOSINOPHIL NFR BLD AUTO: 1.1 % — SIGNIFICANT CHANGE UP (ref 0–6)
GLUCOSE SERPL-MCNC: 70 MG/DL — SIGNIFICANT CHANGE UP (ref 70–99)
HCT VFR BLD CALC: 33 % — LOW (ref 34.5–45)
HGB BLD-MCNC: 10.5 G/DL — LOW (ref 11.5–15.5)
IMM GRANULOCYTES NFR BLD AUTO: 0.5 % — SIGNIFICANT CHANGE UP (ref 0–1.5)
LYMPHOCYTES # BLD AUTO: 2.64 K/UL — SIGNIFICANT CHANGE UP (ref 1–3.3)
LYMPHOCYTES # BLD AUTO: 21.3 % — SIGNIFICANT CHANGE UP (ref 13–44)
MAGNESIUM SERPL-MCNC: 2.4 MG/DL — SIGNIFICANT CHANGE UP (ref 1.6–2.6)
MCHC RBC-ENTMCNC: 28.7 PG — SIGNIFICANT CHANGE UP (ref 27–34)
MCHC RBC-ENTMCNC: 31.8 GM/DL — LOW (ref 32–36)
MCV RBC AUTO: 90.2 FL — SIGNIFICANT CHANGE UP (ref 80–100)
MONOCYTES # BLD AUTO: 1.04 K/UL — HIGH (ref 0–0.9)
MONOCYTES NFR BLD AUTO: 8.4 % — SIGNIFICANT CHANGE UP (ref 2–14)
NEUTROPHILS # BLD AUTO: 8.5 K/UL — HIGH (ref 1.8–7.4)
NEUTROPHILS NFR BLD AUTO: 68.4 % — SIGNIFICANT CHANGE UP (ref 43–77)
NRBC # BLD: 0 /100 WBCS — SIGNIFICANT CHANGE UP (ref 0–0)
PLATELET # BLD AUTO: 383 K/UL — SIGNIFICANT CHANGE UP (ref 150–400)
POTASSIUM SERPL-MCNC: 4.2 MMOL/L — SIGNIFICANT CHANGE UP (ref 3.5–5.3)
POTASSIUM SERPL-SCNC: 4.2 MMOL/L — SIGNIFICANT CHANGE UP (ref 3.5–5.3)
RBC # BLD: 3.66 M/UL — LOW (ref 3.8–5.2)
RBC # FLD: 13.5 % — SIGNIFICANT CHANGE UP (ref 10.3–14.5)
SARS-COV-2 RNA SPEC QL NAA+PROBE: SIGNIFICANT CHANGE UP
SODIUM SERPL-SCNC: 136 MMOL/L — SIGNIFICANT CHANGE UP (ref 135–145)
WBC # BLD: 12.42 K/UL — HIGH (ref 3.8–10.5)
WBC # FLD AUTO: 12.42 K/UL — HIGH (ref 3.8–10.5)

## 2021-04-20 RX ORDER — CEFUROXIME AXETIL 250 MG
250 TABLET ORAL EVERY 24 HOURS
Refills: 0 | Status: COMPLETED | OUTPATIENT
Start: 2021-04-20 | End: 2021-04-21

## 2021-04-20 RX ORDER — INSULIN LISPRO 100/ML
3 VIAL (ML) SUBCUTANEOUS ONCE
Refills: 0 | Status: COMPLETED | OUTPATIENT
Start: 2021-04-20 | End: 2021-04-20

## 2021-04-20 RX ORDER — ATORVASTATIN CALCIUM 80 MG/1
10 TABLET, FILM COATED ORAL AT BEDTIME
Refills: 0 | Status: DISCONTINUED | OUTPATIENT
Start: 2021-04-20 | End: 2021-04-23

## 2021-04-20 RX ADMIN — Medication 5 UNIT(S): at 17:47

## 2021-04-20 RX ADMIN — INSULIN GLARGINE 12 UNIT(S): 100 INJECTION, SOLUTION SUBCUTANEOUS at 22:21

## 2021-04-20 RX ADMIN — HEPARIN SODIUM 5000 UNIT(S): 5000 INJECTION INTRAVENOUS; SUBCUTANEOUS at 22:21

## 2021-04-20 RX ADMIN — Medication 3 MILLILITER(S): at 20:36

## 2021-04-20 RX ADMIN — SODIUM CHLORIDE 1200 MILLILITER(S): 9 INJECTION INTRAMUSCULAR; INTRAVENOUS; SUBCUTANEOUS at 12:23

## 2021-04-20 RX ADMIN — HEPARIN SODIUM 5000 UNIT(S): 5000 INJECTION INTRAVENOUS; SUBCUTANEOUS at 15:03

## 2021-04-20 RX ADMIN — ATORVASTATIN CALCIUM 10 MILLIGRAM(S): 80 TABLET, FILM COATED ORAL at 22:20

## 2021-04-20 RX ADMIN — Medication 3 UNIT(S): at 15:02

## 2021-04-20 RX ADMIN — HALOPERIDOL DECANOATE 1 MILLIGRAM(S): 100 INJECTION INTRAMUSCULAR at 20:34

## 2021-04-20 RX ADMIN — Medication 5 UNIT(S): at 08:26

## 2021-04-20 NOTE — PROGRESS NOTE ADULT - SUBJECTIVE AND OBJECTIVE BOX
TAYLOR IRENE  71y  Female    Patient is a 71y old  Female who presents with a chief complaint of CHF (19 Apr 2021 23:57)    seen on dialysis.    PAST MEDICAL & SURGICAL HISTORY:  Hypertension    Myocardial infarction  2007    Diabetes  Type 2 DM    CAD (coronary artery disease)  s/p stent x 1 in 2007    ESRD (end stage renal disease) on dialysis  MWF since 05/2018    Liver abscess  resolved    Acute urinary retention    Cholecystitis with cholangitis    Chronic pancreatitis    PAF (paroxysmal atrial fibrillation)  1 episode in 11/2018 while hospitalized for acute cholecystitis    H/O diabetic neuropathy    H/O: hysterectomy  1990    History of biliary stent insertion  11/2018 &amp; removal 7/2/19    S/P arteriovenous (AV) fistula creation  05/17/2019    Cataract  IOL b/l            PHYSICAL EXAM:    T(C): 36.6 (04-20-21 @ 11:10), Max: 36.8 (04-19-21 @ 15:43)  HR: 102 (04-20-21 @ 11:10) (101 - 103)  BP: 105/68 (04-20-21 @ 11:10) (105/68 - 125/76)  RR: 16 (04-20-21 @ 11:10) (16 - 18)  SpO2: 97% (04-20-21 @ 11:10) (97% - 100%)  Wt(kg): --    I&O's Detail    19 Apr 2021 07:01  -  20 Apr 2021 07:00  --------------------------------------------------------  IN:    Oral Fluid: 200 mL    Other (mL): 686 mL  Total IN: 886 mL    OUT:  Total OUT: 0 mL    Total NET: 886 mL      Respiratory: clear anteriorly, decreased BS at bases  Cardiovascular: S1 S2  Gastrointestinal: soft NT ND +BS  Extremities: trace edema  Neuro: Awake and alert    MEDICATIONS  (STANDING):  albuterol/ipratropium for Nebulization 3 milliLiter(s) Nebulizer every 6 hours  atorvastatin 80 milliGRAM(s) Oral at bedtime  cefuroxime   Tablet 250 milliGRAM(s) Oral every 24 hours  clopidogrel Tablet 75 milliGRAM(s) Oral daily  heparin   Injectable 5000 Unit(s) SubCutaneous every 8 hours  insulin glargine Injectable (LANTUS) 12 Unit(s) SubCutaneous at bedtime  insulin lispro Injectable (ADMELOG) 5 Unit(s) SubCutaneous before breakfast  insulin lispro Injectable (ADMELOG) 5 Unit(s) SubCutaneous before lunch  insulin lispro Injectable (ADMELOG) 5 Unit(s) SubCutaneous before dinner  insulin lispro Injectable (ADMELOG). 3 Unit(s) SubCutaneous once  metoprolol tartrate 25 milliGRAM(s) Oral two times a day    MEDICATIONS  (PRN):  acetaminophen   Tablet .. 650 milliGRAM(s) Oral every 6 hours PRN Mild Pain (1 - 3)  haloperidol    Injectable 1 milliGRAM(s) IntraMuscular every 8 hours PRN Agitation                            10.5   12.42 )-----------( 383      ( 20 Apr 2021 11:34 )             33.0       04-20    136  |  99  |  70<H>  ----------------------------<  70  4.2   |  24  |  8.00<H>    Ca    9.1      20 Apr 2021 11:34  Phos  8.6     04-19  Mg     2.4     04-20    TPro  x   /  Alb  2.4<L>  /  TBili  x   /  DBili  x   /  AST  x   /  ALT  x   /  AlkPhos  x   04-19      Creatinine Trend: Creatinine Trend: 8.00<--, 8.80<--, 7.30<--, 7.50<--, 7.80<--, 6.30<--

## 2021-04-20 NOTE — PROGRESS NOTE ADULT - SUBJECTIVE AND OBJECTIVE BOX
Patient is a 71y old  Female who presents with a chief complaint of CHF (19 Apr 2021 23:57)      INTERVAL /OVERNIGHT EVENTS: alert awake, denies symptoms    MEDICATIONS  (STANDING):  albuterol/ipratropium for Nebulization 3 milliLiter(s) Nebulizer every 6 hours  atorvastatin 10 milliGRAM(s) Oral at bedtime  cefuroxime   Tablet 250 milliGRAM(s) Oral every 24 hours  clopidogrel Tablet 75 milliGRAM(s) Oral daily  heparin   Injectable 5000 Unit(s) SubCutaneous every 8 hours  insulin glargine Injectable (LANTUS) 12 Unit(s) SubCutaneous at bedtime  insulin lispro Injectable (ADMELOG) 5 Unit(s) SubCutaneous before breakfast  insulin lispro Injectable (ADMELOG) 5 Unit(s) SubCutaneous before lunch  insulin lispro Injectable (ADMELOG) 5 Unit(s) SubCutaneous before dinner  metoprolol tartrate 25 milliGRAM(s) Oral two times a day    MEDICATIONS  (PRN):  acetaminophen   Tablet .. 650 milliGRAM(s) Oral every 6 hours PRN Mild Pain (1 - 3)  haloperidol    Injectable 1 milliGRAM(s) IntraMuscular every 8 hours PRN Agitation      Allergies    ertapenem (Urticaria)  Purell (Rash)    Intolerances        REVIEW OF SYSTEMS:  denies    Vital Signs Last 24 Hrs  T(C): 36.5 (20 Apr 2021 14:10), Max: 36.8 (20 Apr 2021 05:25)  T(F): 97.7 (20 Apr 2021 14:10), Max: 98.2 (20 Apr 2021 05:25)  HR: 74 (20 Apr 2021 14:10) (74 - 103)  BP: 106/52 (20 Apr 2021 14:10) (105/68 - 125/76)  BP(mean): --  RR: 16 (20 Apr 2021 14:10) (16 - 18)  SpO2: 95% (20 Apr 2021 14:10) (95% - 100%)    PHYSICAL EXAM:  GENERAL: NAD, well-groomed, well-developed  HEAD:  Atraumatic, Normocephalic  EYES: EOMI, PERRLA, conjunctiva and sclera clear  ENMT: No tonsillar erythema, exudates, or enlargement; Moist mucous membranes, Good dentition, No lesions  NECK: Supple, No JVD, Normal thyroid  NERVOUS SYSTEM:  Alert & Oriented X3, Good concentration; Motor Strength 5/5 B/L upper and lower extremities; DTRs 2+ intact and symmetric  CHEST/LUNG: Clear to auscultation bilaterally; No rales, rhonchi, wheezing, or rubs  HEART: Regular rate and rhythm; No murmurs, rubs, or gallops  ABDOMEN: Soft, Nontender, Nondistended; Bowel sounds present  EXTREMITIES:  2+ Peripheral Pulses, No clubbing, cyanosis, or edema  LYMPH: No lymphadenopathy noted  SKIN: No rashes or lesions    LABS:                        10.5   12.42 )-----------( 383      ( 20 Apr 2021 11:34 )             33.0     20 Apr 2021 11:34    136    |  99     |  70     ----------------------------<  70     4.2     |  24     |  8.00     Ca    9.1        20 Apr 2021 11:34  Mg     2.4       20 Apr 2021 11:34          CAPILLARY BLOOD GLUCOSE      POCT Blood Glucose.: 101 mg/dL (20 Apr 2021 14:55)  POCT Blood Glucose.: 82 mg/dL (20 Apr 2021 12:04)  POCT Blood Glucose.: 78 mg/dL (20 Apr 2021 11:36)  POCT Blood Glucose.: 140 mg/dL (20 Apr 2021 07:54)  POCT Blood Glucose.: 136 mg/dL (19 Apr 2021 21:44)  POCT Blood Glucose.: 122 mg/dL (19 Apr 2021 17:21)      RADIOLOGY & ADDITIONAL TESTS:    Notes Reviewed:  [x ] YES  [ ] NO    Care Discussed with Consultants/Other Providers [ x] YES  [ ] NO

## 2021-04-20 NOTE — PROGRESS NOTE ADULT - ASSESSMENT
Patient is a 71 year old female with PMH of CAD s/p stents '07, HTN, MI, PAF, liver abscess, s/p biliary stent with removal (11/2018; 7/2/19), chronic pancreatitis, DM2 on insulin, neuropathy, ESRD (5/2018) on HD (M/W/F) who presented (4/12/21) adm with hypoxic renal failure.    - Leukocytosis  despite abn lung imaging pt appears clinically stable with no clear indication of airspace disease, elevation is neutrophilia without clearing of eosinophils, recommend short course fo empiric CEFTRIAXONE-started 4/17 for possible PNA - can change to cefuroxime 250mg PO Q24h to complete course - end tomorrow 4/21    - PNA  as above    - ESRD on HD  Nephrology following, abx renally adjusted as above

## 2021-04-20 NOTE — PROVIDER CONTACT NOTE (OTHER) - ASSESSMENT
RN informed MFD observation order expires 21:47 as per MD she will renew order
Pt alert, confused and combative

## 2021-04-20 NOTE — PROGRESS NOTE ADULT - ASSESSMENT
ESRD on HD  Pulmonary edema  Hypertension  Diabetes  Hyperkalemia  Confusion, + CVA    Pt confused. CT results noted. Pt refused HD today. Next HD tomorrow. PO fluid restriction. Pt with very poor out pt compliance with meds and diet. On lokelma.   Monitor blood sugar levels. Insulin coverage as needed. Monitor BP trend. Titrate BP meds as needed. Salt restriction. Neurology follow up.  Dialysis via left AVF. Still confused.

## 2021-04-20 NOTE — PROGRESS NOTE ADULT - SUBJECTIVE AND OBJECTIVE BOX
[facility], Division of Infectious Diseases  TIKA Martinez Y. Patel, S. Shah  [phone]  ([phone] - weekdays after 5pm and weekends)    Name: IRENE TAYLOR  Age/Gender: 71y Female  MRN: [id number]    Interval History:  Patient seen this morning, sitting up in chair, confused.   Notes reviewed. Afebrile.     Allergies: ertapenem (Urticaria)  Purell (Rash)    Objective:  Vitals:   T(F): 98.2 (04-20-21 @ 05:25), Max: 98.3 (04-19-21 @ 15:43)  HR: 102 (04-20-21 @ 05:25) (68 - 109)  BP: 125/76 (04-20-21 @ 05:25) (72/35 - 125/76)  RR: 18 (04-20-21 @ 05:25) (17 - 18)  SpO2: 100% (04-20-21 @ 05:25) (98% - 100%)  Physical Examination:  General: no acute distress, nontoxic appearing  HEENT: normocephalic, atraumatic  Respiratory: no acc muscle use, breathing comfortably  Cardiovascular: S1 S2 present, normal rate  Gastrointestinal: normal appearing, nondistended  Extremities: no edema, no cyanosis  Skin: warm, dry, no visible rash  Lines: PIV    Laboratory Studies:  CBC:                       10.4   12.95 )-----------( 361      ( 19 Apr 2021 06:28 )             34.0     WBC trend:  WBC Count: 12.95 K/uL (04-19-21 @ 06:28)  WBC Count: 12.90 K/uL (04-18-21 @ 07:33)  WBC Count: 14.27 K/uL (04-17-21 @ 08:24)  WBC Count: 16.61 K/uL (04-15-21 @ 11:49)  WBC Count: 13.08 K/uL (04-14-21 @ 09:23)    CMP: 04-19    134<L>  |  98  |  81<H>  ----------------------------<  123<H>  4.7   |  21<L>  |  8.80<H>    Ca    9.1      19 Apr 2021 11:06  Phos  8.6     04-19    TPro  x   /  Alb  2.4<L>  /  TBili  x   /  DBili  x   /  AST  x   /  ALT  x   /  AlkPhos  x   04-19    LIVER FUNCTIONS - ( 19 Apr 2021 11:06 )  Alb: 2.4 g/dL / Pro: x     / ALK PHOS: x     / ALT: x     / AST: x     / GGT: x           Microbiology:  4/13 - COVID-19 spike domain ab - positive (>250)  4/12 - blood cultures - negative x2  4/12 - COVID-19 PCR - negative    Radiology: reviewed, no new imaging in past 24h    Medications:  MEDICATIONS  (STANDING):  albuterol/ipratropium for Nebulization 3 milliLiter(s) Nebulizer every 6 hours  atorvastatin 80 milliGRAM(s) Oral at bedtime  cefTRIAXone   IVPB 1000 milliGRAM(s) IV Intermittent every 24 hours  clopidogrel Tablet 75 milliGRAM(s) Oral daily  heparin   Injectable 5000 Unit(s) SubCutaneous every 8 hours  insulin glargine Injectable (LANTUS) 12 Unit(s) SubCutaneous at bedtime  insulin lispro Injectable (ADMELOG) 5 Unit(s) SubCutaneous before breakfast  insulin lispro Injectable (ADMELOG) 5 Unit(s) SubCutaneous before lunch  insulin lispro Injectable (ADMELOG) 5 Unit(s) SubCutaneous before dinner  metoprolol tartrate 25 milliGRAM(s) Oral two times a day    MEDICATIONS  (PRN):  acetaminophen   Tablet .. 650 milliGRAM(s) Oral every 6 hours PRN Mild Pain (1 - 3)  haloperidol    Injectable 1 milliGRAM(s) IntraMuscular every 8 hours PRN Agitation  sodium chloride 0.9% Bolus. 100 milliLiter(s) IV Bolus every 5 minutes PRN SBP LESS THAN or EQUAL to 90 mmHg    Antimicrobials:  cefTRIAXone   IVPB 1000 milliGRAM(s) IV Intermittent every 24 hours - started 4/17

## 2021-04-20 NOTE — PROGRESS NOTE ADULT - SUBJECTIVE AND OBJECTIVE BOX
[facility] [place]       HPI:  71 yr female with PMHx CAD s/p stents '07, HTN, MI, PAF, liver abscess, s/p biliary stent with removal (11/2018; 7/2/19), chronic pancreatitis, DM2 on insulin, neuropathy, ESRD (5/2018) on HD (M/W/F) who presented this morning (4/12/21) via EMS from home with progressive SOB and found to be in hypoxic resp failure with SPO2 of 83% on Rm Air.      In E.D. pt initially placed on NRB with improvement of SPO2 99% with eventual placement of bipap therapy secondary to increased WOB and hypercapnic resp failure with ph 7.30 PCO2 of 50 serum HCO3 of 26, CXR with bilat opacities. Pt also found to have hyperkalemia of 6.8 without ECG changes, hyperglycemic of 266, WBC of 14.2, BNP 85739. Pt received lokelma 10gms po, Reg insuline 10 units IV x1.        Consult called for hypoxic/hypercapnic resp failure and hyperkalemia secondary to ESRD   (12 Apr 2021 15:40)        SUBJECTIVE:      ALLERGIES:  Allergies    ertapenem (Urticaria)  Purell (Rash)    Intolerances          MEDICATIONS  (STANDING):  albuterol/ipratropium for Nebulization 3 milliLiter(s) Nebulizer every 6 hours  atorvastatin 10 milliGRAM(s) Oral at bedtime  cefuroxime   Tablet 250 milliGRAM(s) Oral every 24 hours  clopidogrel Tablet 75 milliGRAM(s) Oral daily  heparin   Injectable 5000 Unit(s) SubCutaneous every 8 hours  insulin glargine Injectable (LANTUS) 12 Unit(s) SubCutaneous at bedtime  insulin lispro Injectable (ADMELOG) 5 Unit(s) SubCutaneous before breakfast  insulin lispro Injectable (ADMELOG) 5 Unit(s) SubCutaneous before lunch  insulin lispro Injectable (ADMELOG) 5 Unit(s) SubCutaneous before dinner  metoprolol tartrate 25 milliGRAM(s) Oral two times a day    MEDICATIONS  (PRN):  acetaminophen   Tablet .. 650 milliGRAM(s) Oral every 6 hours PRN Mild Pain (1 - 3)  haloperidol    Injectable 1 milliGRAM(s) IntraMuscular every 8 hours PRN Agitation      REVIEW OF SYSTEMS:  CONSTITUTIONAL: No fever,  RESPIRATORY: No cough, wheezing, shortness of breath  CARDIOVASCULAR: No chest pain, dyspnea, palpitations, dizziness, syncope, paroxysmal nocturnal dyspnea, orthopnea, or arm or leg swelling  GASTROINTESTINAL: No abdominal  or epigastric pain, nausea, vomiting,  diarrhea  NEUROLOGICAL: No headaches,  loss of strength, numbness, or tremors    Vital Signs Last 24 Hrs  T(C): 36.6 (20 Apr 2021 21:06), Max: 36.8 (20 Apr 2021 05:25)  T(F): 97.8 (20 Apr 2021 21:06), Max: 98.2 (20 Apr 2021 05:25)  HR: 119 (20 Apr 2021 21:06) (74 - 119)  BP: 114/76 (20 Apr 2021 21:06) (105/68 - 128/82)  BP(mean): --  RR: 17 (20 Apr 2021 21:06) (16 - 18)  SpO2: 92% (20 Apr 2021 21:06) (92% - 100%)    PHYSICAL EXAM:  HEAD:  Atraumatic, Normocephalic  NECK: Supple and normal thyroid.  No JVD or carotid bruit.   HEART: S1, S2 regular , 1/6 soft ejection systolic murmur in mitral area , no thrill and no gallops .  PULMONARY: Bilateral vesicular breathing , few scattered ronchi and few scattered rales are present .  ABDOMEN: Soft nontender and positive bowl sounds   EXTREMITIES:  No clubbing, cyanosis, or pedal  edema  NEUROLOGICAL: AAOX3 , no focal deficit .    LABS:                        10.5   12.42 )-----------( 383      ( 20 Apr 2021 11:34 )             33.0     04-20    136  |  99  |  70<H>  ----------------------------<  70  4.2   |  24  |  8.00<H>    Ca    9.1      20 Apr 2021 11:34  Phos  8.6     04-19  Mg     2.4     04-20    TPro  x   /  Alb  2.4<L>  /  TBili  x   /  DBili  x   /  AST  x   /  ALT  x   /  AlkPhos  x   04-19            BNP      EKG:  ECHO:  IMAGING:    Assessment/Plan    Will continue to follow during hospital course and give further recommendations as needed . Thanks for your referral . if any questions please contact me at office ([phone] [phone])  YONNY RIVERO MD [facility]  [address] [id number]  [address]  OFFICE : [phone]  CELL : [phone]    CARDIOLOGY F/U :      HPI:  71 yr female with PMHx CAD s/p stents '07, HTN, MI, PAF, liver abscess, s/p biliary stent with removal (11/2018; 7/2/19), chronic pancreatitis, DM2 on insulin, neuropathy, ESRD (5/2018) on HD (M/W/F) who presented this morning (4/12/21) via EMS from home with progressive SOB and found to be in hypoxic resp failure with SPO2 of 83% on Rm Air.      In E.D. pt initially placed on NRB with improvement of SPO2 99% with eventual placement of bipap therapy secondary to increased WOB and hypercapnic resp failure with ph 7.30 PCO2 of 50 serum HCO3 of 26, CXR with bilat opacities. Pt also found to have hyperkalemia of 6.8 without ECG changes, hyperglycemic of 266, WBC of 14.2, BNP 63974. Pt received lokelma 10gms po, Reg insuline 10 units IV x1.        Consult called for hypoxic/hypercapnic resp failure and hyperkalemia secondary to ESRD   (12 Apr 2021 15:40)        SUBJECTIVE: Patient mild confused .      ALLERGIES:  Allergies    ertapenem (Urticaria)  Purell (Rash)    Intolerances          MEDICATIONS  (STANDING):  albuterol/ipratropium for Nebulization 3 milliLiter(s) Nebulizer every 6 hours  atorvastatin 10 milliGRAM(s) Oral at bedtime  cefuroxime   Tablet 250 milliGRAM(s) Oral every 24 hours  clopidogrel Tablet 75 milliGRAM(s) Oral daily  heparin   Injectable 5000 Unit(s) SubCutaneous every 8 hours  insulin glargine Injectable (LANTUS) 12 Unit(s) SubCutaneous at bedtime  insulin lispro Injectable (ADMELOG) 5 Unit(s) SubCutaneous before breakfast  insulin lispro Injectable (ADMELOG) 5 Unit(s) SubCutaneous before lunch  insulin lispro Injectable (ADMELOG) 5 Unit(s) SubCutaneous before dinner  metoprolol tartrate 25 milliGRAM(s) Oral two times a day    MEDICATIONS  (PRN):  acetaminophen   Tablet .. 650 milliGRAM(s) Oral every 6 hours PRN Mild Pain (1 - 3)  haloperidol    Injectable 1 milliGRAM(s) IntraMuscular every 8 hours PRN Agitation      REVIEW OF SYSTEMS:  CONSTITUTIONAL: No fever,  RESPIRATORY: No cough, wheezing, shortness of breath  CARDIOVASCULAR: No chest pain, dyspnea, palpitations, dizziness, syncope, paroxysmal nocturnal dyspnea, orthopnea, or arm or leg swelling  GASTROINTESTINAL: No abdominal  or epigastric pain, nausea, vomiting,  diarrhea  NEUROLOGICAL: No headaches, , numbness, or tremors  Skin : No itching   Hematology : No bleeding .  Endocrinology : No heat and cold intolerance .  Psychiatry : Patient is mild confused   Musculoskeletal : Patient has mild arthritis .    Vital Signs Last 24 Hrs  T(C): 36.6 (20 Apr 2021 21:06), Max: 36.8 (20 Apr 2021 05:25)  T(F): 97.8 (20 Apr 2021 21:06), Max: 98.2 (20 Apr 2021 05:25)  HR: 119 (20 Apr 2021 21:06) (74 - 119)  BP: 114/76 (20 Apr 2021 21:06) (105/68 - 128/82)  BP(mean): --  RR: 17 (20 Apr 2021 21:06) (16 - 18)  SpO2: 92% (20 Apr 2021 21:06) (92% - 100%)    PHYSICAL EXAM:  HEAD:  Atraumatic, Normocephalic  NECK: Supple and normal thyroid.  No JVD or carotid bruit.   HEART: S1, S2 regular , 1/6 soft ejection systolic murmur in mitral area , no thrill and no gallops .  PULMONARY: Bilateral vesicular breathing , few scattered rhonchi and few scattered rales are present .  ABDOMEN: Soft nontender and positive bowl sounds   EXTREMITIES:  No clubbing, cyanosis, or pedal  edema  NEUROLOGICAL: AA and mild confused .  Skin : No rashes .  Musculoskeletal : No joint swellings .    LABS:                        10.5   12.42 )-----------( 383      ( 20 Apr 2021 11:34 )             33.0     04-20    136  |  99  |  70<H>  ----------------------------<  70  4.2   |  24  |  8.00<H>    Ca    9.1      20 Apr 2021 11:34  Phos  8.6     04-19  Mg     2.4     04-20    TPro  x   /  Alb  2.4<L>  /  TBili  x   /  DBili  x   /  AST  x   /  ALT  x   /  AlkPhos  x   04-19            Assessment/Plan  Patient has :  1) CHF and stable . Acute on chronic diastolic heart failure .  2) Acute CVA and better  .  3) No significant cardiac arrythmia on monitor . Patient is in NSR . Patient refusing to wear monitor .  4) ESRD and on hemodialysis   5) Remote one episode of atrial fibrillation more than 3 years ago and during gall bladder surgery as per notes in computer and no recurrence of episode since than as per notes in computer .   6) Anemia   Plan : 1) Cardiac stable so far 2) Full anticoagulation contra indicated at this time due to risk of intra cranial bleed 3) Continue Plavix 4) Monitor hemoglobin and electrolytes . 5) prognosis guarded . 6) Monitor hemoglobin and electrolytes .  Will continue to follow during hospital course and give further recommendations as needed . Thanks for your referral . if any questions please contact me at office ([phone])      YONNY RIVERO MD [facility]  [address] [id number]  [address]  OFFICE : [phone]  CELL : [phone]    CARDIOLOGY F/U :      HPI:  71 yr female with PMHx CAD s/p stents '07, HTN, MI, PAF, liver abscess, s/p biliary stent with removal (11/2018; 7/2/19), chronic pancreatitis, DM2 on insulin, neuropathy, ESRD (5/2018) on HD (M/W/F) who presented this morning (4/12/21) via EMS from home with progressive SOB and found to be in hypoxic resp failure with SPO2 of 83% on Rm Air.      In E.D. pt initially placed on NRB with improvement of SPO2 99% with eventual placement of bipap therapy secondary to increased WOB and hypercapnic resp failure with ph 7.30 PCO2 of 50 serum HCO3 of 26, CXR with bilat opacities. Pt also found to have hyperkalemia of 6.8 without ECG changes, hyperglycemic of 266, WBC of 14.2, BNP 16784. Pt received lokelma 10gms po, Reg insuline 10 units IV x1.        Consult called for hypoxic/hypercapnic resp failure and hyperkalemia secondary to ESRD   (12 Apr 2021 15:40)        SUBJECTIVE: Patient mild confused .      ALLERGIES:  Allergies    ertapenem (Urticaria)  Purell (Rash)    Intolerances          MEDICATIONS  (STANDING):  albuterol/ipratropium for Nebulization 3 milliLiter(s) Nebulizer every 6 hours  atorvastatin 10 milliGRAM(s) Oral at bedtime  cefuroxime   Tablet 250 milliGRAM(s) Oral every 24 hours  clopidogrel Tablet 75 milliGRAM(s) Oral daily  heparin   Injectable 5000 Unit(s) SubCutaneous every 8 hours  insulin glargine Injectable (LANTUS) 12 Unit(s) SubCutaneous at bedtime  insulin lispro Injectable (ADMELOG) 5 Unit(s) SubCutaneous before breakfast  insulin lispro Injectable (ADMELOG) 5 Unit(s) SubCutaneous before lunch  insulin lispro Injectable (ADMELOG) 5 Unit(s) SubCutaneous before dinner  metoprolol tartrate 25 milliGRAM(s) Oral two times a day    MEDICATIONS  (PRN):  acetaminophen   Tablet .. 650 milliGRAM(s) Oral every 6 hours PRN Mild Pain (1 - 3)  haloperidol    Injectable 1 milliGRAM(s) IntraMuscular every 8 hours PRN Agitation      REVIEW OF SYSTEMS:  CONSTITUTIONAL: No fever,  RESPIRATORY: No cough, wheezing, shortness of breath  CARDIOVASCULAR: No chest pain, dyspnea, palpitations, dizziness, syncope, paroxysmal nocturnal dyspnea, orthopnea, or arm or leg swelling  GASTROINTESTINAL: No abdominal  or epigastric pain, nausea, vomiting,  diarrhea  NEUROLOGICAL: No headaches, , numbness, or tremors  Skin : No itching   Hematology : No bleeding .  Endocrinology : No heat and cold intolerance .  Psychiatry : Patient is mild confused   Musculoskeletal : Patient has mild arthritis .    Vital Signs Last 24 Hrs  T(C): 36.6 (20 Apr 2021 21:06), Max: 36.8 (20 Apr 2021 05:25)  T(F): 97.8 (20 Apr 2021 21:06), Max: 98.2 (20 Apr 2021 05:25)  HR: 119 (20 Apr 2021 21:06) (74 - 119)  BP: 114/76 (20 Apr 2021 21:06) (105/68 - 128/82)  BP(mean): --  RR: 17 (20 Apr 2021 21:06) (16 - 18)  SpO2: 92% (20 Apr 2021 21:06) (92% - 100%)    PHYSICAL EXAM:  HEAD:  Atraumatic, Normocephalic  NECK: Supple and normal thyroid.  No JVD or carotid bruit.   HEART: S1, S2 regular , 1/6 soft ejection systolic murmur in mitral area , no thrill and no gallops .  PULMONARY: Bilateral vesicular breathing , few scattered rhonchi and few scattered rales are present .  ABDOMEN: Soft nontender and positive bowl sounds   EXTREMITIES:  No clubbing, cyanosis, or pedal  edema  NEUROLOGICAL: AA and mild confused .  Skin : No rashes .  Musculoskeletal : No joint swellings .    LABS:                        10.5   12.42 )-----------( 383      ( 20 Apr 2021 11:34 )             33.0     04-20    136  |  99  |  70<H>  ----------------------------<  70  4.2   |  24  |  8.00<H>    Ca    9.1      20 Apr 2021 11:34  Phos  8.6     04-19  Mg     2.4     04-20    TPro  x   /  Alb  2.4<L>  /  TBili  x   /  DBili  x   /  AST  x   /  ALT  x   /  AlkPhos  x   04-19            Assessment/Plan  Patient has :  1) CHF and stable . Acute on chronic diastolic heart failure .  2) Acute CVA and better  .  3) No significant cardiac arrythmia on monitor . Patient is in NSR . Patient refusing to wear monitor .  4) ESRD and on hemodialysis   5) Remote one episode of atrial fibrillation more than 3 years ago and during gall bladder surgery as per notes in computer and no recurrence of episode since than as per notes in computer .   6) Anemia   7) H/O Hypertension but BP mostly lower limit of normal .  Plan : 1) Cardiac stable so far 2) Full anticoagulation contra indicated at this time due to risk of intra cranial bleed 3) Continue Plavix 4) Monitor hemoglobin and electrolytes . 5) prognosis guarded . 6) Monitor hemoglobin and electrolytes . 7) Decrease metoprolol  Will continue to follow during hospital course and give further recommendations as needed . Thanks for your referral . if any questions please contact me at office ([phone] [phone])

## 2021-04-20 NOTE — PROGRESS NOTE ADULT - SUBJECTIVE AND OBJECTIVE BOX
Neurology follow up note    IRENE [id number]      Interval History:    Patient feels ok no new complaints.    MEDICATIONS    acetaminophen   Tablet .. 650 milliGRAM(s) Oral every 6 hours PRN  albuterol/ipratropium for Nebulization 3 milliLiter(s) Nebulizer every 6 hours  atorvastatin 80 milliGRAM(s) Oral at bedtime  cefuroxime   Tablet 250 milliGRAM(s) Oral every 24 hours  clopidogrel Tablet 75 milliGRAM(s) Oral daily  haloperidol    Injectable 1 milliGRAM(s) IntraMuscular every 8 hours PRN  heparin   Injectable 5000 Unit(s) SubCutaneous every 8 hours  insulin glargine Injectable (LANTUS) 12 Unit(s) SubCutaneous at bedtime  insulin lispro Injectable (ADMELOG) 5 Unit(s) SubCutaneous before breakfast  insulin lispro Injectable (ADMELOG) 5 Unit(s) SubCutaneous before lunch  insulin lispro Injectable (ADMELOG) 5 Unit(s) SubCutaneous before dinner  metoprolol tartrate 25 milliGRAM(s) Oral two times a day  sodium chloride 0.9% Bolus. 100 milliLiter(s) IV Bolus every 5 minutes PRN      Allergies    ertapenem (Urticaria)  Purell (Rash)    Intolerances            Vital Signs Last 24 Hrs  T(C): 36.8 (20 Apr 2021 05:25), Max: 36.8 (19 Apr 2021 15:43)  T(F): 98.2 (20 Apr 2021 05:25), Max: 98.3 (19 Apr 2021 15:43)  HR: 102 (20 Apr 2021 05:25) (69 - 109)  BP: 125/76 (20 Apr 2021 05:25) (75/45 - 125/76)  BP(mean): --  RR: 18 (20 Apr 2021 05:25) (17 - 18)  SpO2: 100% (20 Apr 2021 05:25) (98% - 100%)      REVIEW OF SYSTEMS:  Very limited but feels ok no significant numbness in feet     PHYSICAL EXAMINATION:  .  HEENT:  Head:  Normocephalic, atraumatic.  Eyes:  No scleral icterus.  Ears:  Hearing appeared to be intact.  NECK:  Supple.  CARDIOVASCULAR:  S1 and S2 heard.  RESPIRATORY:  Good air entry bilaterally.  ABDOMEN:  Soft, nontender.  EXTREMITIES:  No clubbing or cyanosis were noted.      NEUROLOGIC:  The patient is awake and alert.  Location was home  Able to say daughter's name.  Year and month was unknown.  was able to tell number of finger in each hand   Extraocular movements were intact.  Speech was fluent.  Motor:  Bilateral upper 4/5.  Bilateral lower extremities with plantar stimulation, slight flexation at the hip and knee, would say overall 3-/5.  The patient would follow simple commands.                   LABS:  CBC Full  -  ( 19 Apr 2021 06:28 )  WBC Count : 12.95 K/uL  RBC Count : 3.71 M/uL  Hemoglobin : 10.4 g/dL  Hematocrit : 34.0 %  Platelet Count - Automated : 361 K/uL  Mean Cell Volume : 91.6 fl  Mean Cell Hemoglobin : 28.0 pg  Mean Cell Hemoglobin Concentration : 30.6 gm/dL  Auto Neutrophil # : 8.03 K/uL  Auto Lymphocyte # : 3.55 K/uL  Auto Monocyte # : 0.96 K/uL  Auto Eosinophil # : 0.29 K/uL  Auto Basophil # : 0.04 K/uL  Auto Neutrophil % : 62.1 %  Auto Lymphocyte % : 27.4 %  Auto Monocyte % : 7.4 %  Auto Eosinophil % : 2.2 %  Auto Basophil % : 0.3 %      04-19    134<L>  |  98  |  81<H>  ----------------------------<  123<H>  4.7   |  21<L>  |  8.80<H>    Ca    9.1      19 Apr 2021 11:06  Phos  8.6     04-19    TPro  x   /  Alb  2.4<L>  /  TBili  x   /  DBili  x   /  AST  x   /  ALT  x   /  AlkPhos  x   04-19    Hemoglobin A1C:     LIVER FUNCTIONS - ( 19 Apr 2021 11:06 )  Alb: 2.4 g/dL / Pro: x     / ALK PHOS: x     / ALT: x     / AST: x     / GGT: x           Vitamin B12         RADIOLOGY      ANALYSIS AND PLAN:  This is a 71-year-old with episode of altered mental status.    For episode of altered mental status, suspect most likely this is metabolic in nature, questionable secondary to any type of hospital induced delirium psychosis  mental status is better    MRI imaging of the brain noted CVA  For history of diabetes, strict control of blood sugars.  For history of neuropathy, the patient does have elevated renal function.  I would recommend Nephrology followup in regards to possibly adjustment dose of the patient's gabapentin decreased to 100 tid no new complaints   For history of hypertension, monitor systolic blood pressure.  ct chest noted antibiotics as needed   For paroxysmal atrial fibrillation, telemetry evaluation.  Cardiology followup as needed  for CVA will dc asa change to plavix  if patient  does have  AFIB would recommends dc plavix and start AC after 2 weeks form stroke onset  do to size of CVA  echo no intracardiac mass,thrombus or vegetations and  tumor.  more interactive today   physical therapy as tolerated     Spoke with daughter, Frederick; her telephone number is [phone] 4/19/2021 use her to translate and ask mom questions as per did talk to her "perfectly "      Greater than 30 minutes of time was spent with the patient, plan of care, reviewing data, with greater than 50% of the visit was spent counseling and/or coordinating care with multidisciplinary healthcare team

## 2021-04-20 NOTE — PROGRESS NOTE ADULT - SUBJECTIVE AND OBJECTIVE BOX
CAPILLARY BLOOD GLUCOSE      POCT Blood Glucose.: 136 mg/dL (19 Apr 2021 21:44)  POCT Blood Glucose.: 122 mg/dL (19 Apr 2021 17:21)  POCT Blood Glucose.: 119 mg/dL (19 Apr 2021 13:15)  POCT Blood Glucose.: 116 mg/dL (19 Apr 2021 10:44)      Vital Signs Last 24 Hrs  T(C): 36.8 (20 Apr 2021 05:25), Max: 36.8 (19 Apr 2021 15:43)  T(F): 98.2 (20 Apr 2021 05:25), Max: 98.3 (19 Apr 2021 15:43)  HR: 102 (20 Apr 2021 05:25) (68 - 109)  BP: 125/76 (20 Apr 2021 05:25) (72/35 - 150/78)  BP(mean): --  RR: 18 (20 Apr 2021 05:25) (17 - 18)  SpO2: 100% (20 Apr 2021 05:25) (98% - 100%)      Respiratory: CTA B/L  CV: RRR, S1S2, no murmurs, rubs or gallops  Abdominal: Soft, NT, ND +BS, Last BM  Extremities: No edema, + peripheral pulses     04-19    134<L>  |  98  |  81<H>  ----------------------------<  123<H>  4.7   |  21<L>  |  8.80<H>    Ca    9.1      19 Apr 2021 11:06  Phos  8.6     04-19    TPro  x   /  Alb  2.4<L>  /  TBili  x   /  DBili  x   /  AST  x   /  ALT  x   /  AlkPhos  x   04-19      atorvastatin 80 milliGRAM(s) Oral at bedtime  insulin glargine Injectable (LANTUS) 12 Unit(s) SubCutaneous at bedtime  insulin lispro Injectable (ADMELOG) 5 Unit(s) SubCutaneous before breakfast  insulin lispro Injectable (ADMELOG) 5 Unit(s) SubCutaneous before lunch  insulin lispro Injectable (ADMELOG) 5 Unit(s) SubCutaneous before dinner

## 2021-04-21 LAB
HCT VFR BLD CALC: 33.3 % — LOW (ref 34.5–45)
HGB BLD-MCNC: 10.3 G/DL — LOW (ref 11.5–15.5)
MCHC RBC-ENTMCNC: 28.7 PG — SIGNIFICANT CHANGE UP (ref 27–34)
MCHC RBC-ENTMCNC: 30.9 GM/DL — LOW (ref 32–36)
MCV RBC AUTO: 92.8 FL — SIGNIFICANT CHANGE UP (ref 80–100)
NRBC # BLD: 0 /100 WBCS — SIGNIFICANT CHANGE UP (ref 0–0)
PLATELET # BLD AUTO: 384 K/UL — SIGNIFICANT CHANGE UP (ref 150–400)
RBC # BLD: 3.59 M/UL — LOW (ref 3.8–5.2)
RBC # FLD: 13.5 % — SIGNIFICANT CHANGE UP (ref 10.3–14.5)
WBC # BLD: 12.83 K/UL — HIGH (ref 3.8–10.5)
WBC # FLD AUTO: 12.83 K/UL — HIGH (ref 3.8–10.5)

## 2021-04-21 RX ORDER — METOPROLOL TARTRATE 50 MG
12.5 TABLET ORAL DAILY
Refills: 0 | Status: DISCONTINUED | OUTPATIENT
Start: 2021-04-21 | End: 2021-04-23

## 2021-04-21 RX ADMIN — HALOPERIDOL DECANOATE 1 MILLIGRAM(S): 100 INJECTION INTRAMUSCULAR at 21:04

## 2021-04-21 RX ADMIN — HALOPERIDOL DECANOATE 1 MILLIGRAM(S): 100 INJECTION INTRAMUSCULAR at 06:22

## 2021-04-21 RX ADMIN — Medication 5 UNIT(S): at 08:27

## 2021-04-21 RX ADMIN — Medication 3 MILLILITER(S): at 14:19

## 2021-04-21 RX ADMIN — HEPARIN SODIUM 5000 UNIT(S): 5000 INJECTION INTRAVENOUS; SUBCUTANEOUS at 21:05

## 2021-04-21 RX ADMIN — Medication 3 MILLILITER(S): at 08:09

## 2021-04-21 RX ADMIN — Medication 5 UNIT(S): at 17:36

## 2021-04-21 RX ADMIN — ATORVASTATIN CALCIUM 10 MILLIGRAM(S): 80 TABLET, FILM COATED ORAL at 21:08

## 2021-04-21 RX ADMIN — HEPARIN SODIUM 5000 UNIT(S): 5000 INJECTION INTRAVENOUS; SUBCUTANEOUS at 06:23

## 2021-04-21 RX ADMIN — INSULIN GLARGINE 12 UNIT(S): 100 INJECTION, SOLUTION SUBCUTANEOUS at 21:05

## 2021-04-21 NOTE — PROGRESS NOTE ADULT - ASSESSMENT
ESRD on HD  Pulmonary edema  Hypertension  Diabetes  Hyperkalemia  Confusion, + CVA    Pt confused. CT results noted. Pt refused HD today. Next HD tomorrow. PO fluid restriction. Pt with very poor out pt compliance with meds and diet. On lokelma.   Monitor blood sugar levels. Insulin coverage as needed. Monitor BP trend. Titrate BP meds as needed. Salt restriction. Neurology follow up.  04/15/21: HD today. Pt unable to recognize me. Pt still with some agitation/confusion. To continue current meds. Neurology follow up. D/w pt's daughter at bedside.   04/16/21: Next HD tomorrow. To continue current meds. Fluid removal as tolerated by BP.   04/19/21: HD today as pt cooperates. Pt still remains confused. neurology follow up.   04/21/21: Next HD tomorrow. Pt appears to be less agitated today. To continue current meds.

## 2021-04-21 NOTE — PROGRESS NOTE ADULT - SUBJECTIVE AND OBJECTIVE BOX
Neurology follow up note    IRENE [id number]      Interval History:    Patient feels ok no new complaints.    MEDICATIONS    acetaminophen   Tablet .. 650 milliGRAM(s) Oral every 6 hours PRN  albuterol/ipratropium for Nebulization 3 milliLiter(s) Nebulizer every 6 hours  atorvastatin 10 milliGRAM(s) Oral at bedtime  cefuroxime   Tablet 250 milliGRAM(s) Oral every 24 hours  clopidogrel Tablet 75 milliGRAM(s) Oral daily  haloperidol    Injectable 1 milliGRAM(s) IntraMuscular every 8 hours PRN  heparin   Injectable 5000 Unit(s) SubCutaneous every 8 hours  insulin glargine Injectable (LANTUS) 12 Unit(s) SubCutaneous at bedtime  insulin lispro Injectable (ADMELOG) 5 Unit(s) SubCutaneous before breakfast  insulin lispro Injectable (ADMELOG) 5 Unit(s) SubCutaneous before lunch  insulin lispro Injectable (ADMELOG) 5 Unit(s) SubCutaneous before dinner  metoprolol succinate ER 12.5 milliGRAM(s) Oral daily      Allergies    ertapenem (Urticaria)  Purell (Rash)    Intolerances            Vital Signs Last 24 Hrs  T(C): 36.9 (21 Apr 2021 05:48), Max: 36.9 (21 Apr 2021 05:48)  T(F): 98.4 (21 Apr 2021 05:48), Max: 98.4 (21 Apr 2021 05:48)  HR: 96 (21 Apr 2021 10:21) (74 - 119)  BP: 114/76 (20 Apr 2021 21:06) (105/68 - 128/82)  BP(mean): --  RR: 18 (21 Apr 2021 05:48) (16 - 18)  SpO2: 94% (21 Apr 2021 10:21) (92% - 100%)      REVIEW OF SYSTEMS:  Very limited but feels ok no significant numbness in feet     PHYSICAL EXAMINATION:  .  HEENT:  Head:  Normocephalic, atraumatic.  Eyes:  No scleral icterus.  Ears:  Hearing appeared to be intact.  NECK:  Supple.  CARDIOVASCULAR:  S1 and S2 heard.  RESPIRATORY:  Good air entry bilaterally.  ABDOMEN:  Soft, nontender.  EXTREMITIES:  No clubbing or cyanosis were noted.      NEUROLOGIC:  The patient is awake and alert.  Location was home  Able to say daughter's name.  Year and month was unknown.  was able to tell number of finger in each hand   Extraocular movements were intact.  Speech was fluent.  Motor:  Bilateral upper 4/5.  Bilateral lower extremities with plantar stimulation, slight flexation at the hip and knee, would say overall 3-/5.  The patient would follow simple commands.            LABS:  CBC Full  -  ( 20 Apr 2021 11:34 )  WBC Count : 12.42 K/uL  RBC Count : 3.66 M/uL  Hemoglobin : 10.5 g/dL  Hematocrit : 33.0 %  Platelet Count - Automated : 383 K/uL  Mean Cell Volume : 90.2 fl  Mean Cell Hemoglobin : 28.7 pg  Mean Cell Hemoglobin Concentration : 31.8 gm/dL  Auto Neutrophil # : 8.50 K/uL  Auto Lymphocyte # : 2.64 K/uL  Auto Monocyte # : 1.04 K/uL  Auto Eosinophil # : 0.14 K/uL  Auto Basophil # : 0.04 K/uL  Auto Neutrophil % : 68.4 %  Auto Lymphocyte % : 21.3 %  Auto Monocyte % : 8.4 %  Auto Eosinophil % : 1.1 %  Auto Basophil % : 0.3 %      04-20    136  |  99  |  70<H>  ----------------------------<  70  4.2   |  24  |  8.00<H>    Ca    9.1      20 Apr 2021 11:34  Phos  8.6     04-19  Mg     2.4     04-20    TPro  x   /  Alb  2.4<L>  /  TBili  x   /  DBili  x   /  AST  x   /  ALT  x   /  AlkPhos  x   04-19    Hemoglobin A1C:     LIVER FUNCTIONS - ( 19 Apr 2021 11:06 )  Alb: 2.4 g/dL / Pro: x     / ALK PHOS: x     / ALT: x     / AST: x     / GGT: x           Vitamin B12         RADIOLOGY      ANALYSIS AND PLAN:  This is a 71-year-old with episode of altered mental status.    For episode of altered mental status, suspect most likely this is metabolic in nature, questionable secondary to any type of hospital induced delirium psychosis  mental status is better    MRI imaging of the brain noted CVA  For history of diabetes, strict control of blood sugars.  For history of neuropathy, the patient does have elevated renal function.  I would recommend Nephrology followup in regards to possibly adjustment dose of the patient's gabapentin decreased to 100 tid no new complaints   For history of hypertension, monitor systolic blood pressure.  ct chest noted antibiotics as needed   For paroxysmal atrial fibrillation, telemetry evaluation.  Cardiology followup as needed  for CVA will dc asa change to plavix  if patient  does have  AFIB would recommends dc plavix and start AC after 2 weeks form stroke onset  do to size of CVA  echo no intracardiac mass,thrombus or vegetations and  tumor.  more interactive today   physical therapy as tolerated     Spoke with daughter, Frederick; her telephone number is [phone] 4/19/2021 use her to translate and ask mom questions as per did talk to her "perfectly "      Greater than 30 minutes of time was spent with the patient, plan of care, reviewing data, with greater than 50% of the visit was spent counseling and/or coordinating care with multidisciplinary healthcare team

## 2021-04-21 NOTE — PROGRESS NOTE ADULT - SUBJECTIVE AND OBJECTIVE BOX
CAPILLARY BLOOD GLUCOSE      POCT Blood Glucose.: 152 mg/dL (21 Apr 2021 11:44)  POCT Blood Glucose.: 151 mg/dL (21 Apr 2021 07:57)  POCT Blood Glucose.: 149 mg/dL (20 Apr 2021 21:04)  POCT Blood Glucose.: 155 mg/dL (20 Apr 2021 17:06)  POCT Blood Glucose.: 101 mg/dL (20 Apr 2021 14:55)      Vital Signs Last 24 Hrs  T(C): 36.9 (21 Apr 2021 05:48), Max: 36.9 (21 Apr 2021 05:48)  T(F): 98.4 (21 Apr 2021 05:48), Max: 98.4 (21 Apr 2021 05:48)  HR: 96 (21 Apr 2021 10:21) (74 - 119)  BP: 114/76 (20 Apr 2021 21:06) (106/52 - 128/82)  BP(mean): --  RR: 18 (21 Apr 2021 05:48) (16 - 18)  SpO2: 94% (21 Apr 2021 10:21) (92% - 100%)      Respiratory: CTA B/L  CV: RRR, S1S2, no murmurs, rubs or gallops  Abdominal: Soft, NT, ND +BS, Last BM  Extremities: No edema, + peripheral pulses     04-20    136  |  99  |  70<H>  ----------------------------<  70  4.2   |  24  |  8.00<H>    Ca    9.1      20 Apr 2021 11:34  Mg     2.4     04-20        atorvastatin 10 milliGRAM(s) Oral at bedtime  insulin glargine Injectable (LANTUS) 12 Unit(s) SubCutaneous at bedtime  insulin lispro Injectable (ADMELOG) 5 Unit(s) SubCutaneous before breakfast  insulin lispro Injectable (ADMELOG) 5 Unit(s) SubCutaneous before lunch  insulin lispro Injectable (ADMELOG) 5 Unit(s) SubCutaneous before dinner

## 2021-04-21 NOTE — PROGRESS NOTE ADULT - SUBJECTIVE AND OBJECTIVE BOX
Patient is a 71y old  Female who presents with a chief complaint of   Patient seen in follow up for ESRD on HD, SOB.        PAST MEDICAL HISTORY:  Diabetes    Hypertension    Myocardial infarction    Pneumonia    Hypertension    CRF (chronic renal failure)    Diabetes    CAD (coronary artery disease)    ESRD (end stage renal disease) on dialysis    Liver abscess    Acute urinary retention    Cholecystitis with cholangitis    Chronic pancreatitis    PAF (paroxysmal atrial fibrillation)    H/O diabetic neuropathy      MEDICATIONS  (STANDING):  albuterol/ipratropium for Nebulization 3 milliLiter(s) Nebulizer every 6 hours  atorvastatin 10 milliGRAM(s) Oral at bedtime  clopidogrel Tablet 75 milliGRAM(s) Oral daily  heparin   Injectable 5000 Unit(s) SubCutaneous every 8 hours  insulin glargine Injectable (LANTUS) 12 Unit(s) SubCutaneous at bedtime  insulin lispro Injectable (ADMELOG) 5 Unit(s) SubCutaneous before breakfast  insulin lispro Injectable (ADMELOG) 5 Unit(s) SubCutaneous before lunch  insulin lispro Injectable (ADMELOG) 5 Unit(s) SubCutaneous before dinner  metoprolol succinate ER 12.5 milliGRAM(s) Oral daily    MEDICATIONS  (PRN):  acetaminophen   Tablet .. 650 milliGRAM(s) Oral every 6 hours PRN Mild Pain (1 - 3)  haloperidol    Injectable 1 milliGRAM(s) IntraMuscular every 8 hours PRN Agitation    T(C): 36.9 (04-21-21 @ 05:48), Max: 36.9 (04-21-21 @ 05:48)  HR: 80 (04-21-21 @ 14:23) (74 - 119)  BP: 114/76 (04-20-21 @ 21:06) (105/68 - 128/82)  RR: 18 (04-21-21 @ 05:48)  SpO2: 100% (04-21-21 @ 14:23)  Wt(kg): --  I&O's Detail    20 Apr 2021 07:01  -  21 Apr 2021 07:00  --------------------------------------------------------  IN:    Other (mL): 400 mL  Total IN: 400 mL    OUT:  Total OUT: 0 mL    Total NET: 400 mL                PHYSICAL EXAM:  General: No distress  Respiratory: b/l air entry  Cardiovascular: S1 S2  Gastrointestinal: soft  Extremities:  edema, left AVF                              LABORATORY:                        10.3   12.83 )-----------( 384      ( 21 Apr 2021 14:18 )             33.3     04-20    136  |  99  |  70<H>  ----------------------------<  70  4.2   |  24  |  8.00<H>    Ca    9.1      20 Apr 2021 11:34  Mg     2.4     04-20      Sodium, Serum: 136 mmol/L (04-20 @ 11:34)    Potassium, Serum: 4.2 mmol/L (04-20 @ 11:34)    Hemoglobin: 10.3 g/dL (04-21 @ 14:18)  Hemoglobin: 10.5 g/dL (04-20 @ 11:34)  Hemoglobin: 10.4 g/dL (04-19 @ 06:28)    Creatinine, Serum 8.00 (04-20 @ 11:34)  Creatinine, Serum 8.80 (04-19 @ 11:06)

## 2021-04-21 NOTE — PROGRESS NOTE ADULT - SUBJECTIVE AND OBJECTIVE BOX
[facility], Division of Infectious Diseases  TIKA Martinez Y. Patel, S. Shah  [phone]  ([phone] - weekdays after 5pm and weekends)    Name: IRENE TAYLOR  Age/Gender: 71y Female  MRN: [id number]    Interval History:  Patient seen this morning, resting comfortably.  No new complaints noted. Notes reviewed. Afebrile.     Allergies: ertapenem (Urticaria)  Purell (Rash)    Objective:  Vitals:   T(F): 98.4 (04-21-21 @ 05:48), Max: 98.4 (04-21-21 @ 05:48)  HR: 96 (04-21-21 @ 10:21) (74 - 119)  BP: 114/76 (04-20-21 @ 21:06) (105/68 - 128/82)  RR: 18 (04-21-21 @ 05:48) (16 - 18)  SpO2: 94% (04-21-21 @ 10:21) (92% - 100%)  Physical Examination:  General: no acute distress, nontoxic appearing  HEENT: normocephalic, atraumatic  Respiratory: no acc muscle use, breathing comfortably  Cardiovascular: S1 S2 present, normal rate  Gastrointestinal: normal appearing, nondistended  Extremities: no edema, no cyanosis  Skin: warm, dry, no visible rash  Lines: PIV    Laboratory Studies:  CBC:                       10.5   12.42 )-----------( 383      ( 20 Apr 2021 11:34 )             33.0     WBC trend:  WBC Count: 12.42 K/uL (04-20-21 @ 11:34)  WBC Count: 12.95 K/uL (04-19-21 @ 06:28)  WBC Count: 12.90 K/uL (04-18-21 @ 07:33)  WBC Count: 14.27 K/uL (04-17-21 @ 08:24)  WBC Count: 16.61 K/uL (04-15-21 @ 11:49)    CMP: 04-20    136  |  99  |  70<H>  ----------------------------<  70  4.2   |  24  |  8.00<H>    Ca    9.1      20 Apr 2021 11:34  Phos  8.6     04-19  Mg     2.4     04-20    TPro  x   /  Alb  2.4<L>  /  TBili  x   /  DBili  x   /  AST  x   /  ALT  x   /  AlkPhos  x   04-19    LIVER FUNCTIONS - ( 19 Apr 2021 11:06 )  Alb: 2.4 g/dL / Pro: x     / ALK PHOS: x     / ALT: x     / AST: x     / GGT: x              Microbiology:  4/20 - COVID-19 PCR - negative  4/13 - COVID-19 spike domain ab - positive (>250)  4/12 - blood cultures - negative x2  4/12 - COVID-19 PCR - negative    Radiology: reviewed, no new imaging in past 24h    Medications:  MEDICATIONS  (STANDING):  albuterol/ipratropium for Nebulization 3 milliLiter(s) Nebulizer every 6 hours  atorvastatin 10 milliGRAM(s) Oral at bedtime  cefuroxime   Tablet 250 milliGRAM(s) Oral every 24 hours  clopidogrel Tablet 75 milliGRAM(s) Oral daily  heparin   Injectable 5000 Unit(s) SubCutaneous every 8 hours  insulin glargine Injectable (LANTUS) 12 Unit(s) SubCutaneous at bedtime  insulin lispro Injectable (ADMELOG) 5 Unit(s) SubCutaneous before breakfast  insulin lispro Injectable (ADMELOG) 5 Unit(s) SubCutaneous before lunch  insulin lispro Injectable (ADMELOG) 5 Unit(s) SubCutaneous before dinner  metoprolol succinate ER 12.5 milliGRAM(s) Oral daily    MEDICATIONS  (PRN):  acetaminophen   Tablet .. 650 milliGRAM(s) Oral every 6 hours PRN Mild Pain (1 - 3)  haloperidol    Injectable 1 milliGRAM(s) IntraMuscular every 8 hours PRN Agitation    Antimicrobials:  cefuroxime   Tablet 250 milliGRAM(s) Oral every 24 hours - started 4/20  s/p ceftriaxone 4/17-4/20

## 2021-04-21 NOTE — PROGRESS NOTE ADULT - SUBJECTIVE AND OBJECTIVE BOX
Patient is a 71y old  Female who presents with a chief complaint of SOB (20 Apr 2021 23:33)      INTERVAL /OVERNIGHT EVENTS: resting comfortably    MEDICATIONS  (STANDING):  albuterol/ipratropium for Nebulization 3 milliLiter(s) Nebulizer every 6 hours  atorvastatin 10 milliGRAM(s) Oral at bedtime  clopidogrel Tablet 75 milliGRAM(s) Oral daily  heparin   Injectable 5000 Unit(s) SubCutaneous every 8 hours  insulin glargine Injectable (LANTUS) 12 Unit(s) SubCutaneous at bedtime  insulin lispro Injectable (ADMELOG) 5 Unit(s) SubCutaneous before breakfast  insulin lispro Injectable (ADMELOG) 5 Unit(s) SubCutaneous before lunch  insulin lispro Injectable (ADMELOG) 5 Unit(s) SubCutaneous before dinner  metoprolol succinate ER 12.5 milliGRAM(s) Oral daily    MEDICATIONS  (PRN):  acetaminophen   Tablet .. 650 milliGRAM(s) Oral every 6 hours PRN Mild Pain (1 - 3)  haloperidol    Injectable 1 milliGRAM(s) IntraMuscular every 8 hours PRN Agitation      Allergies    ertapenem (Urticaria)  Purell (Rash)    Intolerances        REVIEW OF SYSTEMS:  denies    Vital Signs Last 24 Hrs  T(C): 36.4 (21 Apr 2021 15:29), Max: 36.9 (21 Apr 2021 05:48)  T(F): 97.6 (21 Apr 2021 15:29), Max: 98.4 (21 Apr 2021 05:48)  HR: 102 (21 Apr 2021 15:29) (80 - 119)  BP: 137/79 (21 Apr 2021 15:29) (114/76 - 137/79)  BP(mean): --  RR: 18 (21 Apr 2021 15:29) (17 - 18)  SpO2: 94% (21 Apr 2021 15:29) (92% - 100%)    PHYSICAL EXAM:  GENERAL: NAD, well-groomed, well-developed  HEAD:  Atraumatic, Normocephalic  EYES: EOMI, PERRLA, conjunctiva and sclera clear  ENMT: No tonsillar erythema, exudates, or enlargement; Moist mucous membranes, Good dentition, No lesions  NECK: Supple, No JVD, Normal thyroid  NERVOUS SYSTEM:  Alert & awake, Good concentration; Motor Strength 5/5 B/L upper and lower extremities; DTRs 2+ intact and symmetric  CHEST/LUNG: Clear to auscultation bilaterally; No rales, rhonchi, wheezing, or rubs  HEART: Regular rate and rhythm; No murmurs, rubs, or gallops  ABDOMEN: Soft, Nontender, Nondistended; Bowel sounds present  EXTREMITIES:  2+ Peripheral Pulses, No clubbing, cyanosis, or edema  LYMPH: No lymphadenopathy noted  SKIN: No rashes or lesions    LABS:                        10.3   12.83 )-----------( 384      ( 21 Apr 2021 14:18 )             33.3       Ca    9.1        20 Apr 2021 11:34          CAPILLARY BLOOD GLUCOSE      POCT Blood Glucose.: 152 mg/dL (21 Apr 2021 11:44)  POCT Blood Glucose.: 151 mg/dL (21 Apr 2021 07:57)  POCT Blood Glucose.: 149 mg/dL (20 Apr 2021 21:04)  POCT Blood Glucose.: 155 mg/dL (20 Apr 2021 17:06)      RADIOLOGY & ADDITIONAL TESTS:    Notes Reviewed:  [x ] YES  [ ] NO    Care Discussed with Consultants/Other Providers [x ] YES  [ ] NO

## 2021-04-21 NOTE — PROGRESS NOTE ADULT - ASSESSMENT
Patient is a 71 year old female with PMH of CAD s/p stents '07, HTN, MI, PAF, liver abscess, s/p biliary stent with removal (11/2018; 7/2/19), chronic pancreatitis, DM2 on insulin, neuropathy, ESRD (5/2018) on HD (M/W/F) who presented (4/12/21) adm with hypoxic renal failure.    - Leukocytosis  despite abn lung imaging pt appears clinically stable with no clear indication of airspace disease, elevation is neutrophilia without clearing of eosinophils, recommend short course fo empiric abx - ceftriaxone started 4/17 for possible PNA > continue cefuroxime 250mg PO Q24h to complete course - end today 4/21.  D/C planning per primary team.     - PNA  as above    - ESRD on HD  Nephrology following, abx renally adjusted as above      ID will sign off at this time but remains available for any further questions/concerns.  Arjun Mora M.D.  [facility], Division of Infectious Diseases  [phone]  After 5pm on weekdays and all day on weekends - please call [phone]

## 2021-04-21 NOTE — PROGRESS NOTE ADULT - SUBJECTIVE AND OBJECTIVE BOX
[facility] JERRELL.MIRTHA [place]       HPI:  71 yr female with PMHx CAD s/p stents '07, HTN, MI, PAF, liver abscess, s/p biliary stent with removal (11/2018; 7/2/19), chronic pancreatitis, DM2 on insulin, neuropathy, ESRD (5/2018) on HD (M/W/F) who presented this morning (4/12/21) via EMS from home with progressive SOB and found to be in hypoxic resp failure with SPO2 of 83% on Rm Air.      In E.D. pt initially placed on NRB with improvement of SPO2 99% with eventual placement of bipap therapy secondary to increased WOB and hypercapnic resp failure with ph 7.30 PCO2 of 50 serum HCO3 of 26, CXR with bilat opacities. Pt also found to have hyperkalemia of 6.8 without ECG changes, hyperglycemic of 266, WBC of 14.2, BNP 50958. Pt received lokelma 10gms po, Reg insuline 10 units IV x1.        Consult called for hypoxic/hypercapnic resp failure and hyperkalemia secondary to ESRD   (12 Apr 2021 15:40)        SUBJECTIVE:      ALLERGIES:  Allergies    ertapenem (Urticaria)  Purell (Rash)    Intolerances          MEDICATIONS  (STANDING):  albuterol/ipratropium for Nebulization 3 milliLiter(s) Nebulizer every 6 hours  atorvastatin 10 milliGRAM(s) Oral at bedtime  clopidogrel Tablet 75 milliGRAM(s) Oral daily  heparin   Injectable 5000 Unit(s) SubCutaneous every 8 hours  insulin glargine Injectable (LANTUS) 12 Unit(s) SubCutaneous at bedtime  insulin lispro Injectable (ADMELOG) 5 Unit(s) SubCutaneous before dinner  insulin lispro Injectable (ADMELOG) 5 Unit(s) SubCutaneous before breakfast  insulin lispro Injectable (ADMELOG) 5 Unit(s) SubCutaneous before lunch  metoprolol succinate ER 12.5 milliGRAM(s) Oral daily    MEDICATIONS  (PRN):  acetaminophen   Tablet .. 650 milliGRAM(s) Oral every 6 hours PRN Mild Pain (1 - 3)  haloperidol    Injectable 1 milliGRAM(s) IntraMuscular every 8 hours PRN Agitation      REVIEW OF SYSTEMS:  CONSTITUTIONAL: No fever,  RESPIRATORY: No cough, wheezing, shortness of breath  CARDIOVASCULAR: No chest pain, dyspnea, palpitations, dizziness, syncope, paroxysmal nocturnal dyspnea, orthopnea, or arm or leg swelling  GASTROINTESTINAL: No abdominal  or epigastric pain, nausea, vomiting,  diarrhea  NEUROLOGICAL: No headaches,  loss of strength, numbness, or tremors    Vital Signs Last 24 Hrs  T(C): 36.4 (21 Apr 2021 15:29), Max: 36.9 (21 Apr 2021 05:48)  T(F): 97.6 (21 Apr 2021 15:29), Max: 98.4 (21 Apr 2021 05:48)  HR: 102 (21 Apr 2021 15:29) (80 - 119)  BP: 137/79 (21 Apr 2021 15:29) (114/76 - 137/79)  BP(mean): --  RR: 18 (21 Apr 2021 15:29) (17 - 18)  SpO2: 94% (21 Apr 2021 15:29) (92% - 100%)    PHYSICAL EXAM:  HEAD:  Atraumatic, Normocephalic  NECK: Supple and normal thyroid.  No JVD or carotid bruit.   HEART: S1, S2 regular , 1/6 soft ejection systolic murmur in mitral area , no thrill and no gallops .  PULMONARY: Bilateral vesicular breathing , few scattered ronchi and few scattered rales are present .  ABDOMEN: Soft nontender and positive bowl sounds   EXTREMITIES:  No clubbing, cyanosis, or pedal  edema  NEUROLOGICAL: AAOX3 , no focal deficit .    LABS:                        10.3   12.83 )-----------( 384      ( 21 Apr 2021 14:18 )             33.3     04-20    136  |  99  |  70<H>  ----------------------------<  70  4.2   |  24  |  8.00<H>    Ca    9.1      20 Apr 2021 11:34  Mg     2.4     04-20              BNP      EKG:  ECHO:  IMAGING:    Assessment/Plan    Will continue to follow during hospital course and give further recommendations as needed . Thanks for your referral . if any questions please contact me at office ([phone] [phone])  YONNY RIVERO MD [facility]  [address] [id number]  [address]  OFFICE : [phone]  CELL : [phone]    CARDIOLOGY F/U :      HPI:  71 yr female with PMHx CAD s/p stents '07, HTN, MI, PAF, liver abscess, s/p biliary stent with removal (11/2018; 7/2/19), chronic pancreatitis, DM2 on insulin, neuropathy, ESRD (5/2018) on HD (M/W/F) who presented this morning (4/12/21) via EMS from home with progressive SOB and found to be in hypoxic resp failure with SPO2 of 83% on Rm Air.      In E.D. pt initially placed on NRB with improvement of SPO2 99% with eventual placement of bipap therapy secondary to increased WOB and hypercapnic resp failure with ph 7.30 PCO2 of 50 serum HCO3 of 26, CXR with bilat opacities. Pt also found to have hyperkalemia of 6.8 without ECG changes, hyperglycemic of 266, WBC of 14.2, BNP 54846. Pt received lokelma 10gms po, Reg insuline 10 units IV x1.        Consult called for hypoxic/hypercapnic resp failure and hyperkalemia secondary to ESRD   (12 Apr 2021 15:40)        SUBJECTIVE: patient feeling better .      ALLERGIES:  Allergies    ertapenem (Urticaria)  Purell (Rash)    Intolerances          MEDICATIONS  (STANDING):  albuterol/ipratropium for Nebulization 3 milliLiter(s) Nebulizer every 6 hours  atorvastatin 10 milliGRAM(s) Oral at bedtime  clopidogrel Tablet 75 milliGRAM(s) Oral daily  heparin   Injectable 5000 Unit(s) SubCutaneous every 8 hours  insulin glargine Injectable (LANTUS) 12 Unit(s) SubCutaneous at bedtime  insulin lispro Injectable (ADMELOG) 5 Unit(s) SubCutaneous before dinner  insulin lispro Injectable (ADMELOG) 5 Unit(s) SubCutaneous before breakfast  insulin lispro Injectable (ADMELOG) 5 Unit(s) SubCutaneous before lunch  metoprolol succinate ER 12.5 milliGRAM(s) Oral daily    MEDICATIONS  (PRN):  acetaminophen   Tablet .. 650 milliGRAM(s) Oral every 6 hours PRN Mild Pain (1 - 3)  haloperidol    Injectable 1 milliGRAM(s) IntraMuscular every 8 hours PRN Agitation      REVIEW OF SYSTEMS:  CONSTITUTIONAL: No fever,  RESPIRATORY: No cough, wheezing, shortness of breath  CARDIOVASCULAR: No chest pain, dyspnea, palpitations, dizziness, syncope, paroxysmal nocturnal dyspnea, orthopnea, or arm or leg swelling  GASTROINTESTINAL: No abdominal  or epigastric pain, nausea, vomiting,  diarrhea  NEUROLOGICAL: No headaches, numbness, or tremors  Skin : No itching .  Hematology : No heat and cold intolerance .  Endocrinology : No heat and cold intolerance .  Psychiatry : patient is mild confused .  Musculoskeletal : : patient has mild arthritis .    Vital Signs Last 24 Hrs  T(C): 36.4 (21 Apr 2021 15:29), Max: 36.9 (21 Apr 2021 05:48)  T(F): 97.6 (21 Apr 2021 15:29), Max: 98.4 (21 Apr 2021 05:48)  HR: 102 (21 Apr 2021 15:29) (80 - 119)  BP: 137/79 (21 Apr 2021 15:29) (114/76 - 137/79)  BP(mean): --  RR: 18 (21 Apr 2021 15:29) (17 - 18)  SpO2: 94% (21 Apr 2021 15:29) (92% - 100%)    PHYSICAL EXAM:  HEAD:  Atraumatic, Normocephalic  NECK: Supple and normal thyroid.  No JVD or carotid bruit.   HEART: S1, S2 regular , 1/6 soft ejection systolic murmur in mitral area , no thrill and no gallops .  PULMONARY: Bilateral vesicular breathing , few scattered rhonchi and few scattered rales are present .  ABDOMEN: Soft nontender and positive bowl sounds   EXTREMITIES:  No clubbing, cyanosis, or pedal  edema  NEUROLOGICAL: AA and mild confused .   Skin : No rashes .  Musculoskeletal : No joint swellings     LABS:                        10.3   12.83 )-----------( 384      ( 21 Apr 2021 14:18 )             33.3     04-20    136  |  99  |  70<H>  ----------------------------<  70  4.2   |  24  |  8.00<H>    Ca    9.1      20 Apr 2021 11:34  Mg     2.4     04-20              Assessment/Plan  Patient has :  1) CHF and stable . Acute on chronic diastolic heart failure .  2) Acute CVA and better  .  3) No significant cardiac arrythmia on monitor . Patient is in NSR . Patient refusing to wear monitor .  4) ESRD and on hemodialysis   5) Remote one episode of atrial fibrillation more than 3 years ago and during gall bladder surgery as per notes in computer and no recurrence of episode since than as per notes in computer .   6) Anemia   7) H/O Hypertension but BP so far stable   Plan : 1) Cardiac stable so far 2) Full anticoagulation contra indicated at this time due to risk of intra cranial bleed 3) Continue Plavix 4) Monitor hemoglobin and electrolytes . 5) prognosis guarded . 6) Monitor hemoglobin and electrolytes .   Will continue to follow during hospital course and give further recommendations as needed . Thanks for your referral . if any questions please contact me at office ([phone])

## 2021-04-22 LAB
ANION GAP SERPL CALC-SCNC: 11 MMOL/L — SIGNIFICANT CHANGE UP (ref 5–17)
BUN SERPL-MCNC: 54 MG/DL — HIGH (ref 7–23)
CALCIUM SERPL-MCNC: 8.8 MG/DL — SIGNIFICANT CHANGE UP (ref 8.5–10.1)
CHLORIDE SERPL-SCNC: 100 MMOL/L — SIGNIFICANT CHANGE UP (ref 96–108)
CO2 SERPL-SCNC: 26 MMOL/L — SIGNIFICANT CHANGE UP (ref 22–31)
CREAT SERPL-MCNC: 7.1 MG/DL — HIGH (ref 0.5–1.3)
GLUCOSE SERPL-MCNC: 162 MG/DL — HIGH (ref 70–99)
HCT VFR BLD CALC: 32.7 % — LOW (ref 34.5–45)
HGB BLD-MCNC: 10.2 G/DL — LOW (ref 11.5–15.5)
MCHC RBC-ENTMCNC: 28.7 PG — SIGNIFICANT CHANGE UP (ref 27–34)
MCHC RBC-ENTMCNC: 31.2 GM/DL — LOW (ref 32–36)
MCV RBC AUTO: 91.9 FL — SIGNIFICANT CHANGE UP (ref 80–100)
NRBC # BLD: 0 /100 WBCS — SIGNIFICANT CHANGE UP (ref 0–0)
PLATELET # BLD AUTO: 404 K/UL — HIGH (ref 150–400)
POTASSIUM SERPL-MCNC: 4.2 MMOL/L — SIGNIFICANT CHANGE UP (ref 3.5–5.3)
POTASSIUM SERPL-SCNC: 4.2 MMOL/L — SIGNIFICANT CHANGE UP (ref 3.5–5.3)
RBC # BLD: 3.56 M/UL — LOW (ref 3.8–5.2)
RBC # FLD: 13.4 % — SIGNIFICANT CHANGE UP (ref 10.3–14.5)
SARS-COV-2 RNA SPEC QL NAA+PROBE: SIGNIFICANT CHANGE UP
SODIUM SERPL-SCNC: 137 MMOL/L — SIGNIFICANT CHANGE UP (ref 135–145)
WBC # BLD: 12.72 K/UL — HIGH (ref 3.8–10.5)
WBC # FLD AUTO: 12.72 K/UL — HIGH (ref 3.8–10.5)

## 2021-04-22 PROCEDURE — 99221 1ST HOSP IP/OBS SF/LOW 40: CPT

## 2021-04-22 RX ORDER — RISPERIDONE 4 MG/1
0.5 TABLET ORAL
Refills: 0 | Status: DISCONTINUED | OUTPATIENT
Start: 2021-04-22 | End: 2021-04-23

## 2021-04-22 RX ORDER — INSULIN LISPRO 100/ML
VIAL (ML) SUBCUTANEOUS
Refills: 0 | Status: DISCONTINUED | OUTPATIENT
Start: 2021-04-22 | End: 2021-04-23

## 2021-04-22 RX ORDER — INSULIN LISPRO 100/ML
VIAL (ML) SUBCUTANEOUS AT BEDTIME
Refills: 0 | Status: DISCONTINUED | OUTPATIENT
Start: 2021-04-22 | End: 2021-04-23

## 2021-04-22 RX ADMIN — Medication 3: at 17:30

## 2021-04-22 RX ADMIN — Medication 5 UNIT(S): at 17:29

## 2021-04-22 RX ADMIN — HEPARIN SODIUM 5000 UNIT(S): 5000 INJECTION INTRAVENOUS; SUBCUTANEOUS at 13:45

## 2021-04-22 RX ADMIN — HEPARIN SODIUM 5000 UNIT(S): 5000 INJECTION INTRAVENOUS; SUBCUTANEOUS at 21:20

## 2021-04-22 RX ADMIN — Medication 3 MILLILITER(S): at 19:58

## 2021-04-22 RX ADMIN — CLOPIDOGREL BISULFATE 75 MILLIGRAM(S): 75 TABLET, FILM COATED ORAL at 13:45

## 2021-04-22 RX ADMIN — ATORVASTATIN CALCIUM 10 MILLIGRAM(S): 80 TABLET, FILM COATED ORAL at 21:20

## 2021-04-22 RX ADMIN — HEPARIN SODIUM 5000 UNIT(S): 5000 INJECTION INTRAVENOUS; SUBCUTANEOUS at 05:00

## 2021-04-22 RX ADMIN — Medication 3 MILLILITER(S): at 15:35

## 2021-04-22 RX ADMIN — INSULIN GLARGINE 12 UNIT(S): 100 INJECTION, SOLUTION SUBCUTANEOUS at 21:20

## 2021-04-22 RX ADMIN — RISPERIDONE 0.5 MILLIGRAM(S): 4 TABLET ORAL at 21:20

## 2021-04-22 NOTE — PROGRESS NOTE ADULT - NSICDXPROBLEMPLAN1_GEN_ALL_CORE_FT
cont lantus 12 units qhs  cont admelog 5 units 3x/day  cont cons cho diet  goal bg 100-180 in hosp setting
cont lantus 12 units qhs  cont admelog 5 units 3x/day  cont low dose admelog scale coverage qac/qhs  cont cons cho diet
decrease lantus 12 units qhs  cont admelog 5 units 3x/day  cont low dose admelog scale coverage qac/qhs  cont cons cho diet
cont lantus 12 units qhs  cont admelog 5 units 3x/day  cont cons cho diet  goal bg 100-180 in hosp setting  added low dose admelog scale coverage qac/qhs
cont lantus 12 units qhs  cont admelog 5 units 3x/day  cont low dose admelog scale coverage qac/qhs  cont cons cho diet
HD per renal, PULM eval with Dr. TOUSSAINT appreciated  emergent HD
cont lantus 12 units qhs  cont admelog 5 units 3x/day  cont low dose admelog scale coverage qac/qhs  cont cons cho diet
cont lantus 12 units qhs  cont admelog 5 units 3x/day  cont low dose admelog scale coverage qac/qhs  cont cons cho diet
HD per renal, PULM eval with Dr. TOUSSAINT  emergent HD
HD per renal, PULM eval with Dr. TOUSSAINT appreciated  emergent HD  HD per renal, PULM eval with Dr. TOUSSAINT appreciated  emergent HD
HD per renal, PULM eval with Dr. TOUSSAINT appreciated  emergent HD
HD per renal, PULM eval
HD per renal, PULM eval with Dr. TOUSSAINT appreciated  emergent HD
HD per renal, PULM eval with Dr. TOUSSAINT appreciated  emergent HD  HD per renal, PULM eval with Dr. TOUSSAINT appreciated  emergent HD
HD per renal, PULM eval with Dr. TOUSSAINT  emergent HD
HD per renal, PULM eval with Dr. TOUSSAINT appreciated  emergent HD  HD per renal, PULM eval with Dr. TOUSSAINT appreciated  emergent HD
HD per renal, PULM eval with Dr. TOUSSAINT appreciated  emergent HD

## 2021-04-22 NOTE — PROGRESS NOTE ADULT - PROBLEM SELECTOR PROBLEM 1
SOB (shortness of breath)
Diabetes
SOB (shortness of breath)
Diabetes
SOB (shortness of breath)
Diabetes
SOB (shortness of breath)

## 2021-04-22 NOTE — PROGRESS NOTE ADULT - NSICDXPROBLEMPLAN3_GEN_ALL_CORE_FT
Continue metoprolol,
Continue metoprolol,   Continue metoprolol,
Continue metoprolol,
Continue metoprolol,
Continue metoprolol, imdur, nifedipine
Continue metoprolol,
Continue metoprolol,
Continue metoprolol,   Continue metoprolol,
Continue metoprolol,
Continue metoprolol,   Continue metoprolol,

## 2021-04-22 NOTE — PROGRESS NOTE ADULT - PROBLEM SELECTOR PROBLEM 6
Cognitive deficits

## 2021-04-22 NOTE — PROGRESS NOTE ADULT - PROBLEM SELECTOR PROBLEM 7
Impaired mobility and ADLs

## 2021-04-22 NOTE — PROGRESS NOTE ADULT - TIME BILLING
RN, Nephro and patient
Consultants , RN and PMD
Discussed with RN , PA PMD and Consultants
Consultants , RN and PMD
Consultants , RN and PMD

## 2021-04-22 NOTE — BH CONSULTATION LIAISON ASSESSMENT NOTE - NSBHCHARTREVIEWLAB_PSY_A_CORE FT
10.3   12.83 )-----------( 384      ( 21 Apr 2021 14:18 )             33.3   04-20    136  |  99  |  70<H>  ----------------------------<  70  4.2   |  24  |  8.00<H>    Ca    9.1      20 Apr 2021 11:34  Mg     2.4     04-20

## 2021-04-22 NOTE — BH CONSULTATION LIAISON ASSESSMENT NOTE - RISK ASSESSMENT
Patient has no prior psychiatric hospitalizations or significant treatment, who is experiencing delirium

## 2021-04-22 NOTE — PROGRESS NOTE ADULT - SUBJECTIVE AND OBJECTIVE BOX
CAPILLARY BLOOD GLUCOSE      POCT Blood Glucose.: 170 mg/dL (22 Apr 2021 07:35)  POCT Blood Glucose.: 242 mg/dL (21 Apr 2021 21:06)  POCT Blood Glucose.: 264 mg/dL (21 Apr 2021 17:33)  POCT Blood Glucose.: 152 mg/dL (21 Apr 2021 11:44)      Vital Signs Last 24 Hrs  T(C): 36.4 (22 Apr 2021 09:25), Max: 36.8 (21 Apr 2021 20:00)  T(F): 97.6 (22 Apr 2021 09:25), Max: 98.2 (21 Apr 2021 20:00)  HR: 83 (22 Apr 2021 09:25) (80 - 102)  BP: 160/68 (22 Apr 2021 09:25) (125/80 - 160/68)  BP(mean): --  RR: 18 (22 Apr 2021 09:25) (18 - 19)  SpO2: 97% (22 Apr 2021 09:25) (92% - 100%)      Respiratory: CTA B/L  CV: RRR, S1S2, no murmurs, rubs or gallops  Abdominal: Soft, NT, ND +BS, Last BM  Extremities: No edema, + peripheral pulses     04-20    136  |  99  |  70<H>  ----------------------------<  70  4.2   |  24  |  8.00<H>    Ca    9.1      20 Apr 2021 11:34  Mg     2.4     04-20        atorvastatin 10 milliGRAM(s) Oral at bedtime  insulin glargine Injectable (LANTUS) 12 Unit(s) SubCutaneous at bedtime  insulin lispro Injectable (ADMELOG) 5 Unit(s) SubCutaneous before breakfast  insulin lispro Injectable (ADMELOG) 5 Unit(s) SubCutaneous before lunch  insulin lispro Injectable (ADMELOG) 5 Unit(s) SubCutaneous before dinner

## 2021-04-22 NOTE — BH CONSULTATION LIAISON ASSESSMENT NOTE - CURRENT MEDICATION
MEDICATIONS  (STANDING):  albuterol/ipratropium for Nebulization 3 milliLiter(s) Nebulizer every 6 hours  atorvastatin 10 milliGRAM(s) Oral at bedtime  clopidogrel Tablet 75 milliGRAM(s) Oral daily  heparin   Injectable 5000 Unit(s) SubCutaneous every 8 hours  insulin glargine Injectable (LANTUS) 12 Unit(s) SubCutaneous at bedtime  insulin lispro Injectable (ADMELOG) 5 Unit(s) SubCutaneous before breakfast  insulin lispro Injectable (ADMELOG) 5 Unit(s) SubCutaneous before lunch  insulin lispro Injectable (ADMELOG) 5 Unit(s) SubCutaneous before dinner  metoprolol succinate ER 12.5 milliGRAM(s) Oral daily    MEDICATIONS  (PRN):  acetaminophen   Tablet .. 650 milliGRAM(s) Oral every 6 hours PRN Mild Pain (1 - 3)  haloperidol    Injectable 1 milliGRAM(s) IntraMuscular every 8 hours PRN Agitation

## 2021-04-22 NOTE — PROGRESS NOTE ADULT - NSREFPHYEXINPTDOCREFER_GEN_ALL_CORE
I examined patient

## 2021-04-22 NOTE — PROGRESS NOTE ADULT - SUBJECTIVE AND OBJECTIVE BOX
Patient is a 71y old  Female who presents with a chief complaint of   Patient seen in follow up for ESRD on HD, SOB.        PAST MEDICAL HISTORY:  Diabetes    Hypertension    Myocardial infarction    Pneumonia    Hypertension    CRF (chronic renal failure)    Diabetes    CAD (coronary artery disease)    ESRD (end stage renal disease) on dialysis    Liver abscess    Acute urinary retention    Cholecystitis with cholangitis    Chronic pancreatitis    PAF (paroxysmal atrial fibrillation)    H/O diabetic neuropathy      MEDICATIONS  (STANDING):  albuterol/ipratropium for Nebulization 3 milliLiter(s) Nebulizer every 6 hours  atorvastatin 10 milliGRAM(s) Oral at bedtime  clopidogrel Tablet 75 milliGRAM(s) Oral daily  heparin   Injectable 5000 Unit(s) SubCutaneous every 8 hours  insulin glargine Injectable (LANTUS) 12 Unit(s) SubCutaneous at bedtime  insulin lispro (ADMELOG) corrective regimen sliding scale   SubCutaneous three times a day before meals  insulin lispro (ADMELOG) corrective regimen sliding scale   SubCutaneous at bedtime  insulin lispro Injectable (ADMELOG) 5 Unit(s) SubCutaneous before breakfast  insulin lispro Injectable (ADMELOG) 5 Unit(s) SubCutaneous before lunch  insulin lispro Injectable (ADMELOG) 5 Unit(s) SubCutaneous before dinner  metoprolol succinate ER 12.5 milliGRAM(s) Oral daily  risperiDONE   Tablet 0.5 milliGRAM(s) Oral two times a day    MEDICATIONS  (PRN):  acetaminophen   Tablet .. 650 milliGRAM(s) Oral every 6 hours PRN Mild Pain (1 - 3)  haloperidol    Injectable 1 milliGRAM(s) IntraMuscular every 8 hours PRN Agitation    T(C): 36.7 (04-22-21 @ 15:43), Max: 36.9 (04-21-21 @ 05:48)  HR: 107 (04-22-21 @ 15:43) (80 - 130)  BP: 104/67 (04-22-21 @ 15:43) (104/67 - 160/68)  RR: 18 (04-22-21 @ 15:43)  SpO2: 94% (04-22-21 @ 15:43)  Wt(kg): --  I&O's Detail    22 Apr 2021 07:01  -  22 Apr 2021 15:55  --------------------------------------------------------  IN:  Total IN: 0 mL    OUT:    Other (mL): 629 mL  Total OUT: 629 mL    Total NET: -629 mL            PHYSICAL EXAM:  General: No distress  Respiratory: b/l air entry  Cardiovascular: S1 S2  Gastrointestinal: soft  Extremities:  edema, left AVF                          LABORATORY:                        10.2   12.72 )-----------( 404      ( 22 Apr 2021 10:28 )             32.7     04-22    137  |  100  |  54<H>  ----------------------------<  162<H>  4.2   |  26  |  7.10<H>    Ca    8.8      22 Apr 2021 10:28      Sodium, Serum: 137 mmol/L (04-22 @ 10:28)    Potassium, Serum: 4.2 mmol/L (04-22 @ 10:28)    Hemoglobin: 10.2 g/dL (04-22 @ 10:28)  Hemoglobin: 10.3 g/dL (04-21 @ 14:18)  Hemoglobin: 10.5 g/dL (04-20 @ 11:34)    Creatinine, Serum 7.10 (04-22 @ 10:28)  Creatinine, Serum 8.00 (04-20 @ 11:34)

## 2021-04-22 NOTE — BH CONSULTATION LIAISON ASSESSMENT NOTE - NSBHCHARTREVIEWVS_PSY_A_CORE FT
Vital Signs Last 24 Hrs  T(C): 36.4 (22 Apr 2021 08:21), Max: 36.8 (21 Apr 2021 20:00)  T(F): 97.5 (22 Apr 2021 08:21), Max: 98.2 (21 Apr 2021 20:00)  HR: 82 (22 Apr 2021 08:21) (80 - 102)  BP: 125/80 (22 Apr 2021 08:21) (125/80 - 147/70)  BP(mean): --  RR: 18 (22 Apr 2021 08:21) (18 - 19)  SpO2: 93% (22 Apr 2021 08:21) (92% - 100%)

## 2021-04-22 NOTE — PROGRESS NOTE ADULT - NSICDXPROBLEMPLAN2_GEN_ALL_CORE_FT
emergent HD due to acute respiratory failure, hyperkalemia
emergent HD due to acute respiratory failure, hyperkalemia  emergent HD due to acute respiratory failure, hyperkalemia
emergent HD due to acute respiratory failure, hyperkalemia
emergent HD due to acute respiratory failure, hyperkalemia  emergent HD due to acute respiratory failure, hyperkalemia
emergent HD due to acute respiratory failure, hyperkalemia  emergent HD due to acute respiratory failure, hyperkalemia
emergent HD due to acute respiratory failure, hyperkalemia

## 2021-04-22 NOTE — PROGRESS NOTE ADULT - PROBLEM SELECTOR PROBLEM 2
ESRD (end stage renal disease) on dialysis

## 2021-04-22 NOTE — PROGRESS NOTE ADULT - NSICDXPROBLEMPLAN5_GEN_ALL_CORE_FT
neuro eval with dr. frances ASA for antiplatelet therapy  neuro eval with dr. frances ASA for antiplatelet therapy
neuro eval, ASA for antiplatelet therapy
neuro eval with dr. daniels, GEMA for antiplatelet therapy
neuro eval with dr. frances ASA for antiplatelet therapy  neuro eval with dr. frances ASA for antiplatelet therapy
neuro eval with dr. daniels, GEMA for antiplatelet therapy
neuro eval with dr. daniels, GEMA for antiplatelet therapy
neuro eval, ASA for antiplatelet therapy
neuro eval, ASA for antiplatelet therapy
neuro eval with dr. frances ASA for antiplatelet therapy  neuro eval with dr. frances ASA for antiplatelet therapy

## 2021-04-22 NOTE — PROGRESS NOTE ADULT - PROBLEM SELECTOR PROBLEM 5
Cerebrovascular accident (CVA)

## 2021-04-22 NOTE — PROGRESS NOTE ADULT - NSICDXPROBLEMPLAN4_GEN_ALL_CORE_FT
Continue RISS  check A1C, endo eval with dr. perlman  Continue RISS  check A1C, endo eval with dr. perlman
Continue RISS  check A1C
Continue RISS  check A1C, endo eval with dr. perlman
Continue RISS  check A1C, endo eval with dr. perlman
Continue RISS  check A1C
Continue RISS  check A1C
Continue RISS  check A1C, endo eval with dr. perlman  Continue RISS  check A1C, endo eval with dr. perlman
Continue RISS  check A1C, endo eval with dr. perlman  Continue RISS  check A1C, endo eval with dr. perlman
Continue RISS  check A1C, endo eval
Continue RISS  check A1C, endo eval with dr. perlman

## 2021-04-22 NOTE — PROGRESS NOTE ADULT - ASSESSMENT
ESRD on HD  Pulmonary edema  Hypertension  Diabetes  Hyperkalemia  Confusion, + CVA    Pt confused. CT results noted. Pt refused HD today. Next HD tomorrow. PO fluid restriction. Pt with very poor out pt compliance with meds and diet. On lokelma.   Monitor blood sugar levels. Insulin coverage as needed. Monitor BP trend. Titrate BP meds as needed. Salt restriction. Neurology follow up.  04/15/21: HD today. Pt unable to recognize me. Pt still with some agitation/confusion. To continue current meds. Neurology follow up. D/w pt's daughter at bedside.   04/16/21: Next HD tomorrow. To continue current meds. Fluid removal as tolerated by BP.   04/19/21: HD today as pt cooperates. Pt still remains confused. neurology follow up.   04/21/21: Next HD tomorrow. Pt appears to be less agitated today. To continue current meds.   04/22/21: HD today. Pt agitated during hemodialysis. To continue current meds. D/c planning if stable.

## 2021-04-22 NOTE — PROGRESS NOTE ADULT - SUBJECTIVE AND OBJECTIVE BOX
[facility]       HPI:  71 yr female with PMHx CAD s/p stents '07, HTN, MI, PAF, liver abscess, s/p biliary stent with removal (11/2018; 7/2/19), chronic pancreatitis, DM2 on insulin, neuropathy, ESRD (5/2018) on HD (M/W/F) who presented this morning (4/12/21) via EMS from home with progressive SOB and found to be in hypoxic resp failure with SPO2 of 83% on Rm Air.      In E.D. pt initially placed on NRB with improvement of SPO2 99% with eventual placement of bipap therapy secondary to increased WOB and hypercapnic resp failure with ph 7.30 PCO2 of 50 serum HCO3 of 26, CXR with bilat opacities. Pt also found to have hyperkalemia of 6.8 without ECG changes, hyperglycemic of 266, WBC of 14.2, BNP 30531. Pt received lokelma 10gms po, Reg insuline 10 units IV x1.        Consult called for hypoxic/hypercapnic resp failure and hyperkalemia secondary to ESRD   (12 Apr 2021 15:40)        SUBJECTIVE:      ALLERGIES:  Allergies    ertapenem (Urticaria)  Purell (Rash)    Intolerances          MEDICATIONS  (STANDING):  albuterol/ipratropium for Nebulization 3 milliLiter(s) Nebulizer every 6 hours  atorvastatin 10 milliGRAM(s) Oral at bedtime  clopidogrel Tablet 75 milliGRAM(s) Oral daily  heparin   Injectable 5000 Unit(s) SubCutaneous every 8 hours  insulin glargine Injectable (LANTUS) 12 Unit(s) SubCutaneous at bedtime  insulin lispro (ADMELOG) corrective regimen sliding scale   SubCutaneous three times a day before meals  insulin lispro (ADMELOG) corrective regimen sliding scale   SubCutaneous at bedtime  insulin lispro Injectable (ADMELOG) 5 Unit(s) SubCutaneous before breakfast  insulin lispro Injectable (ADMELOG) 5 Unit(s) SubCutaneous before lunch  insulin lispro Injectable (ADMELOG) 5 Unit(s) SubCutaneous before dinner  metoprolol succinate ER 12.5 milliGRAM(s) Oral daily  risperiDONE   Tablet 0.5 milliGRAM(s) Oral two times a day    MEDICATIONS  (PRN):  acetaminophen   Tablet .. 650 milliGRAM(s) Oral every 6 hours PRN Mild Pain (1 - 3)  haloperidol    Injectable 1 milliGRAM(s) IntraMuscular every 8 hours PRN Agitation      REVIEW OF SYSTEMS:  CONSTITUTIONAL: No fever,  RESPIRATORY: No cough, wheezing, shortness of breath  CARDIOVASCULAR: No chest pain, dyspnea, palpitations, dizziness, syncope, paroxysmal nocturnal dyspnea, orthopnea, or arm or leg swelling  GASTROINTESTINAL: No abdominal  or epigastric pain, nausea, vomiting,  diarrhea  NEUROLOGICAL: No headaches,  loss of strength, numbness, or tremors    Vital Signs Last 24 Hrs  T(C): 36.4 (22 Apr 2021 19:48), Max: 36.7 (22 Apr 2021 12:00)  T(F): 97.6 (22 Apr 2021 19:48), Max: 98 (22 Apr 2021 12:00)  HR: 102 (22 Apr 2021 20:00) (82 - 130)  BP: 119/72 (22 Apr 2021 19:48) (104/67 - 160/68)  BP(mean): --  RR: 20 (22 Apr 2021 19:48) (18 - 20)  SpO2: 93% (22 Apr 2021 20:00) (92% - 99%)    PHYSICAL EXAM:  HEAD:  Atraumatic, Normocephalic  NECK: Supple and normal thyroid.  No JVD or carotid bruit.   HEART: S1, S2 regular , 1/6 soft ejection systolic murmur in mitral area , no thrill and no gallops .  PULMONARY: Bilateral vesicular breathing , few scattered ronchi and few scattered rales are present .  ABDOMEN: Soft nontender and positive bowl sounds   EXTREMITIES:  No clubbing, cyanosis, or pedal  edema  NEUROLOGICAL: AAOX3 , no focal deficit .    LABS:                        10.2   12.72 )-----------( 404      ( 22 Apr 2021 10:28 )             32.7     04-22    137  |  100  |  54<H>  ----------------------------<  162<H>  4.2   |  26  |  7.10<H>    Ca    8.8      22 Apr 2021 10:28              BNP      EKG:  ECHO:  IMAGING:    Assessment/Plan    Will continue to follow during hospital course and give further recommendations as needed . Thanks for your referral . if any questions please contact me at office ([phone] [phone])  YONNY RIVERO MD [facility]  [address] [id number]  [address]  OFFICE : [phone]  CELL : [phone]    CARDIOLOGY F/U :       HPI:  71 yr female with PMHx CAD s/p stents '07, HTN, MI, PAF, liver abscess, s/p biliary stent with removal (11/2018; 7/2/19), chronic pancreatitis, DM2 on insulin, neuropathy, ESRD (5/2018) on HD (M/W/F) who presented this morning (4/12/21) via EMS from home with progressive SOB and found to be in hypoxic resp failure with SPO2 of 83% on Rm Air.      In E.D. pt initially placed on NRB with improvement of SPO2 99% with eventual placement of bipap therapy secondary to increased WOB and hypercapnic resp failure with ph 7.30 PCO2 of 50 serum HCO3 of 26, CXR with bilat opacities. Pt also found to have hyperkalemia of 6.8 without ECG changes, hyperglycemic of 266, WBC of 14.2, BNP 72157. Pt received lokelma 10gms po, Reg insuline 10 units IV x1.        Consult called for hypoxic/hypercapnic resp failure and hyperkalemia secondary to ESRD   (12 Apr 2021 15:40)        SUBJECTIVE: Patient lying comfortable .      ALLERGIES:  Allergies    ertapenem (Urticaria)  Purell (Rash)    Intolerances          MEDICATIONS  (STANDING):  albuterol/ipratropium for Nebulization 3 milliLiter(s) Nebulizer every 6 hours  atorvastatin 10 milliGRAM(s) Oral at bedtime  clopidogrel Tablet 75 milliGRAM(s) Oral daily  heparin   Injectable 5000 Unit(s) SubCutaneous every 8 hours  insulin glargine Injectable (LANTUS) 12 Unit(s) SubCutaneous at bedtime  insulin lispro (ADMELOG) corrective regimen sliding scale   SubCutaneous three times a day before meals  insulin lispro (ADMELOG) corrective regimen sliding scale   SubCutaneous at bedtime  insulin lispro Injectable (ADMELOG) 5 Unit(s) SubCutaneous before breakfast  insulin lispro Injectable (ADMELOG) 5 Unit(s) SubCutaneous before lunch  insulin lispro Injectable (ADMELOG) 5 Unit(s) SubCutaneous before dinner  metoprolol succinate ER 12.5 milliGRAM(s) Oral daily  risperiDONE   Tablet 0.5 milliGRAM(s) Oral two times a day    MEDICATIONS  (PRN):  acetaminophen   Tablet .. 650 milliGRAM(s) Oral every 6 hours PRN Mild Pain (1 - 3)  haloperidol    Injectable 1 milliGRAM(s) IntraMuscular every 8 hours PRN Agitation      REVIEW OF SYSTEMS:  CONSTITUTIONAL: No fever,  RESPIRATORY: No cough, wheezing, shortness of breath  CARDIOVASCULAR: No chest pain, dyspnea, palpitations, dizziness, syncope, paroxysmal nocturnal dyspnea, orthopnea, or arm or leg swelling  GASTROINTESTINAL: No abdominal  or epigastric pain, nausea, vomiting,  diarrhea  NEUROLOGICAL: No headaches,  numbness, or tremors  Skin : No itching .  Hematology : No bleeding   Endocrinology : No heat and cold intolerance   Psychiatry : patient is mild confused .  Musculoskeletal : patient has mild arthritis .    Vital Signs Last 24 Hrs  T(C): 36.4 (22 Apr 2021 19:48), Max: 36.7 (22 Apr 2021 12:00)  T(F): 97.6 (22 Apr 2021 19:48), Max: 98 (22 Apr 2021 12:00)  HR: 102 (22 Apr 2021 20:00) (82 - 130)  BP: 119/72 (22 Apr 2021 19:48) (104/67 - 160/68)  BP(mean): --  RR: 20 (22 Apr 2021 19:48) (18 - 20)  SpO2: 93% (22 Apr 2021 20:00) (92% - 99%)    PHYSICAL EXAM:  HEAD:  Atraumatic, Normocephalic  NECK: Supple and normal thyroid.  No JVD or carotid bruit.   HEART: S1, S2 regular , 1/6 soft ejection systolic murmur in mitral area , no thrill and no gallops .  PULMONARY: Bilateral vesicular breathing , few scattered rhonchi and few scattered rales are present .  ABDOMEN: Soft nontender and positive bowl sounds   EXTREMITIES:  No clubbing, cyanosis, or pedal  edema  NEUROLOGICAL: AA and mild confused .   Skin : No rashes .  Musculoskeletal : No joint swellings .    LABS:                        10.2   12.72 )-----------( 404      ( 22 Apr 2021 10:28 )             32.7     04-22    137  |  100  |  54<H>  ----------------------------<  162<H>  4.2   |  26  |  7.10<H>    Ca    8.8      22 Apr 2021 10:28              Assessment/Plan  Patient has :  1) CHF and stable . Acute on chronic diastolic heart failure .  2) Acute CVA and better  .  3) No significant cardiac arrythmia on monitor . Patient is in NSR . Patient refusing to wear monitor .  4) ESRD and on hemodialysis   5) Remote one episode of atrial fibrillation more than 3 years ago and during gall bladder surgery as per notes in computer and no recurrence of episode since than as per notes in computer .   6) Anemia   7) H/O Hypertension but BP so far stable   Plan : 1) Cardiac stable so far 2) Full anticoagulation contra indicated at this time due to risk of intra cranial bleed and will start full anticoagulation as soon as OK with neurology  3) Continue Plavix 4) Monitor hemoglobin and electrolytes . 5) prognosis guarded . 6) Monitor hemoglobin and electrolytes .   Will continue to follow during hospital course and give further recommendations as needed . Thanks for your referral . if any questions please contact me at office ([phone] [phone])

## 2021-04-22 NOTE — PROGRESS NOTE ADULT - SUBJECTIVE AND OBJECTIVE BOX
Patient is a 71y old  Female who presents with a chief complaint of sob (21 Apr 2021 20:52)      INTERVAL /OVERNIGHT EVENTS: still with intermitent agitation    MEDICATIONS  (STANDING):  albuterol/ipratropium for Nebulization 3 milliLiter(s) Nebulizer every 6 hours  atorvastatin 10 milliGRAM(s) Oral at bedtime  clopidogrel Tablet 75 milliGRAM(s) Oral daily  heparin   Injectable 5000 Unit(s) SubCutaneous every 8 hours  insulin glargine Injectable (LANTUS) 12 Unit(s) SubCutaneous at bedtime  insulin lispro (ADMELOG) corrective regimen sliding scale   SubCutaneous three times a day before meals  insulin lispro (ADMELOG) corrective regimen sliding scale   SubCutaneous at bedtime  insulin lispro Injectable (ADMELOG) 5 Unit(s) SubCutaneous before breakfast  insulin lispro Injectable (ADMELOG) 5 Unit(s) SubCutaneous before lunch  insulin lispro Injectable (ADMELOG) 5 Unit(s) SubCutaneous before dinner  metoprolol succinate ER 12.5 milliGRAM(s) Oral daily  risperiDONE   Tablet 0.5 milliGRAM(s) Oral two times a day    MEDICATIONS  (PRN):  acetaminophen   Tablet .. 650 milliGRAM(s) Oral every 6 hours PRN Mild Pain (1 - 3)  haloperidol    Injectable 1 milliGRAM(s) IntraMuscular every 8 hours PRN Agitation      Allergies    ertapenem (Urticaria)  Purell (Rash)    Intolerances        REVIEW OF SYSTEMS:  CONSTITUTIONAL: No fever, weight loss, or fatigue  EYES: No eye pain, visual disturbances, or discharge  ENMT:  No difficulty hearing, tinnitus, vertigo; No sinus or throat pain  NECK: No pain or stiffness  RESPIRATORY: No cough, wheezing, chills or hemoptysis; No shortness of breath  CARDIOVASCULAR: No chest pain, palpitations, dizziness, or leg swelling  GASTROINTESTINAL: No abdominal or epigastric pain. No nausea, vomiting, or hematemesis; No diarrhea or constipation. No melena or hematochezia.  GENITOURINARY: No dysuria, frequency, hematuria, or incontinence  NEUROLOGICAL: No headaches, memory loss, loss of strength, numbness, or tremors  SKIN: No itching, burning, rashes, or lesions   LYMPH NODES: No enlarged glands  ENDOCRINE: No heat or cold intolerance; No hair loss; No polydipsia or polyuria  MUSCULOSKELETAL: No joint pain or swelling; No muscle, back, or extremity pain  PSYCHIATRIC: No depression, anxiety, mood swings, or difficulty sleeping  HEME/LYMPH: No easy bruising, or bleeding gums  ALLERGY AND IMMUNOLOGIC: No hives or eczema    Vital Signs Last 24 Hrs  T(C): 36.7 (22 Apr 2021 15:43), Max: 36.8 (21 Apr 2021 20:00)  T(F): 98 (22 Apr 2021 15:43), Max: 98.2 (21 Apr 2021 20:00)  HR: 107 (22 Apr 2021 15:43) (82 - 130)  BP: 104/67 (22 Apr 2021 15:43) (104/67 - 160/68)  BP(mean): --  RR: 18 (22 Apr 2021 15:43) (18 - 19)  SpO2: 94% (22 Apr 2021 15:43) (92% - 99%)    PHYSICAL EXAM:  GENERAL: NAD, well-groomed, well-developed  HEAD:  Atraumatic, Normocephalic  EYES: EOMI, PERRLA, conjunctiva and sclera clear  ENMT: No tonsillar erythema, exudates, or enlargement; Moist mucous membranes, Good dentition, No lesions  NECK: Supple, No JVD, Normal thyroid  NERVOUS SYSTEM:  Alert & Oriented X3, Good concentration; Motor Strength 5/5 B/L upper and lower extremities; DTRs 2+ intact and symmetric  CHEST/LUNG: Clear to auscultation bilaterally; No rales, rhonchi, wheezing, or rubs  HEART: Regular rate and rhythm; No murmurs, rubs, or gallops  ABDOMEN: Soft, Nontender, Nondistended; Bowel sounds present  EXTREMITIES:  2+ Peripheral Pulses, No clubbing, cyanosis, or edema  LYMPH: No lymphadenopathy noted  SKIN: No rashes or lesions    LABS:                        10.2   12.72 )-----------( 404      ( 22 Apr 2021 10:28 )             32.7     22 Apr 2021 10:28    137    |  100    |  54     ----------------------------<  162    4.2     |  26     |  7.10     Ca    8.8        22 Apr 2021 10:28          CAPILLARY BLOOD GLUCOSE      POCT Blood Glucose.: 251 mg/dL (22 Apr 2021 16:55)  POCT Blood Glucose.: 124 mg/dL (22 Apr 2021 11:29)  POCT Blood Glucose.: 170 mg/dL (22 Apr 2021 07:35)  POCT Blood Glucose.: 242 mg/dL (21 Apr 2021 21:06)      RADIOLOGY & ADDITIONAL TESTS:    Notes Reviewed:  [x ] YES  [ ] NO    Care Discussed with Consultants/Other Providers [x ] YES  [ ] NO

## 2021-04-22 NOTE — PROGRESS NOTE ADULT - ASSESSMENT
Patient is a 71 year old female with PMH of CAD s/p stents '07, HTN, MI, PAF, liver abscess, s/p biliary stent with removal (11/2018; 7/2/19), chronic pancreatitis, DM2 on insulin, neuropathy, ESRD (5/2018) on HD (M/W/F) who presented (4/12/21) adm with hypoxic renal failure.    Pneumonia    - treated for possible pneumonia with ceftriaxone > cefuroxime x5d - completed 4/21    Leukocytosis    - treated for possible pneumonia as above, leukocytosis stable, patient afebrile   - continue off antibiotics    - D/C planning per primary team    ESRD on HD   - Nephrology following      Discussed with Dr. Mcclure.   ID will sign off at this time but remains available for any further questions/concerns.  Arjun Mora M.D.  [facility], Division of Infectious Diseases  [phone]  After 5pm on weekdays and all day on weekends - please call [phone]

## 2021-04-22 NOTE — PROGRESS NOTE ADULT - SUBJECTIVE AND OBJECTIVE BOX
Neurology follow up note    IRENE [id number]      Interval History:    Patient feels ok no new complaints undergoing HD    MEDICATIONS    acetaminophen   Tablet .. 650 milliGRAM(s) Oral every 6 hours PRN  albuterol/ipratropium for Nebulization 3 milliLiter(s) Nebulizer every 6 hours  atorvastatin 10 milliGRAM(s) Oral at bedtime  clopidogrel Tablet 75 milliGRAM(s) Oral daily  haloperidol    Injectable 1 milliGRAM(s) IntraMuscular every 8 hours PRN  heparin   Injectable 5000 Unit(s) SubCutaneous every 8 hours  insulin glargine Injectable (LANTUS) 12 Unit(s) SubCutaneous at bedtime  insulin lispro (ADMELOG) corrective regimen sliding scale   SubCutaneous three times a day before meals  insulin lispro (ADMELOG) corrective regimen sliding scale   SubCutaneous at bedtime  insulin lispro Injectable (ADMELOG) 5 Unit(s) SubCutaneous before breakfast  insulin lispro Injectable (ADMELOG) 5 Unit(s) SubCutaneous before lunch  insulin lispro Injectable (ADMELOG) 5 Unit(s) SubCutaneous before dinner  metoprolol succinate ER 12.5 milliGRAM(s) Oral daily  risperiDONE   Tablet 0.5 milliGRAM(s) Oral two times a day      Allergies    ertapenem (Urticaria)  Purell (Rash)    Intolerances            Vital Signs Last 24 Hrs  T(C): 36.4 (22 Apr 2021 09:25), Max: 36.8 (21 Apr 2021 20:00)  T(F): 97.6 (22 Apr 2021 09:25), Max: 98.2 (21 Apr 2021 20:00)  HR: 83 (22 Apr 2021 09:25) (80 - 102)  BP: 160/68 (22 Apr 2021 09:25) (125/80 - 160/68)  BP(mean): --  RR: 18 (22 Apr 2021 09:25) (18 - 19)  SpO2: 97% (22 Apr 2021 09:25) (92% - 100%)      REVIEW OF SYSTEMS:  Very limited but feels ok no significant numbness in feet     PHYSICAL EXAMINATION:  .  HEENT:  Head:  Normocephalic, atraumatic.  Eyes:  No scleral icterus.  Ears:  Hearing appeared to be intact.  NECK:  Supple.  CARDIOVASCULAR:  S1 and S2 heard.  RESPIRATORY:  Good air entry bilaterally.  ABDOMEN:  Soft, nontender.  EXTREMITIES:  No clubbing or cyanosis were noted.      NEUROLOGIC:  The patient is awake and alert.  Location was home  Able to say daughter's name.  Year and month was unknown.  was able to tell number of finger in each hand   Extraocular movements were intact.  Speech was fluent.  Motor:  Bilateral upper 4/5.  Bilateral lower extremities with plantar stimulation, slight flexation at the hip and knee, would say overall 3-/5.  The patient would follow simple commands.              LABS:  CBC Full  -  ( 22 Apr 2021 10:28 )  WBC Count : 12.72 K/uL  RBC Count : 3.56 M/uL  Hemoglobin : 10.2 g/dL  Hematocrit : 32.7 %  Platelet Count - Automated : 404 K/uL  Mean Cell Volume : 91.9 fl  Mean Cell Hemoglobin : 28.7 pg  Mean Cell Hemoglobin Concentration : 31.2 gm/dL  Auto Neutrophil # : x  Auto Lymphocyte # : x  Auto Monocyte # : x  Auto Eosinophil # : x  Auto Basophil # : x  Auto Neutrophil % : x  Auto Lymphocyte % : x  Auto Monocyte % : x  Auto Eosinophil % : x  Auto Basophil % : x      04-20    136  |  99  |  70<H>  ----------------------------<  70  4.2   |  24  |  8.00<H>    Ca    9.1      20 Apr 2021 11:34  Mg     2.4     04-20      Hemoglobin A1C:       Vitamin B12         RADIOLOGY    ANALYSIS AND PLAN:  This is a 71-year-old with episode of altered mental status.    For episode of altered mental status, suspect most likely this is metabolic in nature, questionable secondary to any type of hospital induced delirium psychosis  mental status is better    MRI imaging of the brain noted CVA  For history of diabetes, strict control of blood sugars.  For history of neuropathy, the patient does have elevated renal function.  I would recommend Nephrology followup in regards to possibly adjustment dose of the patient's gabapentin decreased to 100 tid no new complaints   For history of hypertension, monitor systolic blood pressure.  ct chest noted antibiotics as needed   For paroxysmal atrial fibrillation, telemetry evaluation.  Cardiology followup as needed  for CVA will dc asa change to plavix  if patient  does have  AFIB would recommends dc plavix and start AC after 2 weeks form stroke onset  do to size of CVA  echo no intracardiac mass,thrombus or vegetations and  tumor.  more interactive today   physical therapy as tolerated     Spoke with daughter, Frederick; her telephone number is [phone] 4/19/2021 use her to translate and ask mom questions as per did talk to her "perfectly "      Greater than 30 minutes of time was spent with the patient, plan of care, reviewing data, with greater than 50% of the visit was spent counseling and/or coordinating care with multidisciplinary healthcare team

## 2021-04-23 ENCOUNTER — TRANSCRIPTION ENCOUNTER (OUTPATIENT)
Age: 71
End: 2021-04-23

## 2021-04-23 VITALS
RESPIRATION RATE: 18 BRPM | DIASTOLIC BLOOD PRESSURE: 76 MMHG | HEART RATE: 104 BPM | TEMPERATURE: 97 F | SYSTOLIC BLOOD PRESSURE: 144 MMHG | OXYGEN SATURATION: 98 %

## 2021-04-23 PROCEDURE — 80061 LIPID PANEL: CPT

## 2021-04-23 PROCEDURE — 80053 COMPREHEN METABOLIC PANEL: CPT

## 2021-04-23 PROCEDURE — 99291 CRITICAL CARE FIRST HOUR: CPT | Mod: 25

## 2021-04-23 PROCEDURE — 86803 HEPATITIS C AB TEST: CPT

## 2021-04-23 PROCEDURE — 97535 SELF CARE MNGMENT TRAINING: CPT

## 2021-04-23 PROCEDURE — 70450 CT HEAD/BRAIN W/O DYE: CPT

## 2021-04-23 PROCEDURE — 99261: CPT

## 2021-04-23 PROCEDURE — 84100 ASSAY OF PHOSPHORUS: CPT

## 2021-04-23 PROCEDURE — 84484 ASSAY OF TROPONIN QUANT: CPT

## 2021-04-23 PROCEDURE — 96375 TX/PRO/DX INJ NEW DRUG ADDON: CPT

## 2021-04-23 PROCEDURE — 97167 OT EVAL HIGH COMPLEX 60 MIN: CPT

## 2021-04-23 PROCEDURE — 92610 EVALUATE SWALLOWING FUNCTION: CPT

## 2021-04-23 PROCEDURE — 86706 HEP B SURFACE ANTIBODY: CPT

## 2021-04-23 PROCEDURE — 85730 THROMBOPLASTIN TIME PARTIAL: CPT

## 2021-04-23 PROCEDURE — 97163 PT EVAL HIGH COMPLEX 45 MIN: CPT

## 2021-04-23 PROCEDURE — U0005: CPT

## 2021-04-23 PROCEDURE — 80048 BASIC METABOLIC PNL TOTAL CA: CPT

## 2021-04-23 PROCEDURE — 87340 HEPATITIS B SURFACE AG IA: CPT

## 2021-04-23 PROCEDURE — 84443 ASSAY THYROID STIM HORMONE: CPT

## 2021-04-23 PROCEDURE — 83036 HEMOGLOBIN GLYCOSYLATED A1C: CPT

## 2021-04-23 PROCEDURE — 85610 PROTHROMBIN TIME: CPT

## 2021-04-23 PROCEDURE — 80069 RENAL FUNCTION PANEL: CPT

## 2021-04-23 PROCEDURE — 82140 ASSAY OF AMMONIA: CPT

## 2021-04-23 PROCEDURE — 97110 THERAPEUTIC EXERCISES: CPT

## 2021-04-23 PROCEDURE — 84145 PROCALCITONIN (PCT): CPT

## 2021-04-23 PROCEDURE — 85025 COMPLETE CBC W/AUTO DIFF WBC: CPT

## 2021-04-23 PROCEDURE — 82803 BLOOD GASES ANY COMBINATION: CPT

## 2021-04-23 PROCEDURE — 87635 SARS-COV-2 COVID-19 AMP PRB: CPT

## 2021-04-23 PROCEDURE — 92522 EVALUATE SPEECH PRODUCTION: CPT

## 2021-04-23 PROCEDURE — 93005 ELECTROCARDIOGRAM TRACING: CPT

## 2021-04-23 PROCEDURE — 82553 CREATINE MB FRACTION: CPT

## 2021-04-23 PROCEDURE — 93306 TTE W/DOPPLER COMPLETE: CPT

## 2021-04-23 PROCEDURE — 85027 COMPLETE CBC AUTOMATED: CPT

## 2021-04-23 PROCEDURE — 36600 WITHDRAWAL OF ARTERIAL BLOOD: CPT

## 2021-04-23 PROCEDURE — 82746 ASSAY OF FOLIC ACID SERUM: CPT

## 2021-04-23 PROCEDURE — 86769 SARS-COV-2 COVID-19 ANTIBODY: CPT

## 2021-04-23 PROCEDURE — 82550 ASSAY OF CK (CPK): CPT

## 2021-04-23 PROCEDURE — 70551 MRI BRAIN STEM W/O DYE: CPT

## 2021-04-23 PROCEDURE — 94761 N-INVAS EAR/PLS OXIMETRY MLT: CPT

## 2021-04-23 PROCEDURE — 71045 X-RAY EXAM CHEST 1 VIEW: CPT

## 2021-04-23 PROCEDURE — 94660 CPAP INITIATION&MGMT: CPT

## 2021-04-23 PROCEDURE — 71250 CT THORAX DX C-: CPT

## 2021-04-23 PROCEDURE — 83880 ASSAY OF NATRIURETIC PEPTIDE: CPT

## 2021-04-23 PROCEDURE — 94640 AIRWAY INHALATION TREATMENT: CPT

## 2021-04-23 PROCEDURE — 87040 BLOOD CULTURE FOR BACTERIA: CPT

## 2021-04-23 PROCEDURE — 83605 ASSAY OF LACTIC ACID: CPT

## 2021-04-23 PROCEDURE — 97530 THERAPEUTIC ACTIVITIES: CPT

## 2021-04-23 PROCEDURE — 36415 COLL VENOUS BLD VENIPUNCTURE: CPT

## 2021-04-23 PROCEDURE — U0003: CPT

## 2021-04-23 PROCEDURE — 96374 THER/PROPH/DIAG INJ IV PUSH: CPT

## 2021-04-23 PROCEDURE — 83735 ASSAY OF MAGNESIUM: CPT

## 2021-04-23 PROCEDURE — 82962 GLUCOSE BLOOD TEST: CPT

## 2021-04-23 PROCEDURE — 70544 MR ANGIOGRAPHY HEAD W/O DYE: CPT

## 2021-04-23 PROCEDURE — 82607 VITAMIN B-12: CPT

## 2021-04-23 RX ORDER — SODIUM ZIRCONIUM CYCLOSILICATE 10 G/10G
0 POWDER, FOR SUSPENSION ORAL
Qty: 0 | Refills: 0 | DISCHARGE

## 2021-04-23 RX ORDER — RISPERIDONE 4 MG/1
1 TABLET ORAL
Qty: 0 | Refills: 0 | DISCHARGE
Start: 2021-04-23

## 2021-04-23 RX ORDER — CLOPIDOGREL BISULFATE 75 MG/1
1 TABLET, FILM COATED ORAL
Qty: 0 | Refills: 0 | DISCHARGE
Start: 2021-04-23

## 2021-04-23 RX ORDER — RISPERIDONE 4 MG/1
0.25 TABLET ORAL THREE TIMES A DAY
Refills: 0 | Status: DISCONTINUED | OUTPATIENT
Start: 2021-04-23 | End: 2021-04-23

## 2021-04-23 RX ORDER — ISOSORBIDE MONONITRATE 60 MG/1
1 TABLET, EXTENDED RELEASE ORAL
Qty: 0 | Refills: 0 | DISCHARGE

## 2021-04-23 RX ORDER — GABAPENTIN 400 MG/1
1 CAPSULE ORAL
Qty: 0 | Refills: 0 | DISCHARGE

## 2021-04-23 RX ORDER — HEPARIN SODIUM 5000 [USP'U]/ML
0 INJECTION INTRAVENOUS; SUBCUTANEOUS
Qty: 0 | Refills: 0 | DISCHARGE
Start: 2021-04-23

## 2021-04-23 RX ORDER — ACETAMINOPHEN 500 MG
2 TABLET ORAL
Qty: 0 | Refills: 0 | DISCHARGE
Start: 2021-04-23

## 2021-04-23 RX ADMIN — Medication 5 UNIT(S): at 12:08

## 2021-04-23 RX ADMIN — Medication 2: at 12:08

## 2021-04-23 RX ADMIN — Medication 5 UNIT(S): at 08:21

## 2021-04-23 RX ADMIN — HEPARIN SODIUM 5000 UNIT(S): 5000 INJECTION INTRAVENOUS; SUBCUTANEOUS at 14:05

## 2021-04-23 RX ADMIN — Medication 3 MILLILITER(S): at 07:42

## 2021-04-23 RX ADMIN — RISPERIDONE 0.25 MILLIGRAM(S): 4 TABLET ORAL at 14:05

## 2021-04-23 RX ADMIN — CLOPIDOGREL BISULFATE 75 MILLIGRAM(S): 75 TABLET, FILM COATED ORAL at 12:08

## 2021-04-23 RX ADMIN — Medication 1: at 08:20

## 2021-04-23 RX ADMIN — HEPARIN SODIUM 5000 UNIT(S): 5000 INJECTION INTRAVENOUS; SUBCUTANEOUS at 05:06

## 2021-04-23 RX ADMIN — RISPERIDONE 0.5 MILLIGRAM(S): 4 TABLET ORAL at 05:06

## 2021-04-23 NOTE — PROGRESS NOTE ADULT - PROVIDER SPECIALTY LIST ADULT
Cardiology
Endocrinology
Infectious Disease
Nephrology
Nephrology
Neurology
Pulmonology
Cardiology
Endocrinology
Infectious Disease
Nephrology
Neurology
Neurology
Pulmonology
Cardiology
Infectious Disease
Nephrology
Nephrology
Neurology
Pulmonology
Endocrinology
Nephrology
Cardiology
Cardiology
Endocrinology
Hospitalist
Family Medicine
Hospitalist

## 2021-04-23 NOTE — DISCHARGE NOTE NURSING/CASE MANAGEMENT/SOCIAL WORK - PATIENT PORTAL LINK FT
You can access the FollowMyHealth Patient Portal offered by [facility] by registering at the following website: [email or web address]. By joining [facility]’s FollowMyHealth portal, you will also be able to view your health information using other applications (apps) compatible with our system.

## 2021-04-23 NOTE — DISCHARGE NOTE PROVIDER - HOSPITAL COURSE
admitted for SOB / dyspnea with acute respiratory failure  likely from fluid overload  responded well to HD  found to have CVA on CT and MRI  AMS with agitation  DC after neuro and PT clearance

## 2021-04-23 NOTE — CHART NOTE - NSCHARTNOTEFT_GEN_A_CORE

## 2021-04-23 NOTE — DISCHARGE NOTE PROVIDER - PROVIDER TOKENS
PROVIDER:[TOKEN:[[id number]]],PROVIDER:[TOKEN:[[id number]]],PROVIDER:[TOKEN:[[id number]]],PROVIDER:[TOKEN:[[id number]]]

## 2021-04-23 NOTE — PROGRESS NOTE ADULT - SUBJECTIVE AND OBJECTIVE BOX
Neurology follow up note    IRENE [id number]      Interval History:    Patient feels ok no new complaints.    MEDICATIONS    acetaminophen   Tablet .. 650 milliGRAM(s) Oral every 6 hours PRN  albuterol/ipratropium for Nebulization 3 milliLiter(s) Nebulizer every 6 hours  atorvastatin 10 milliGRAM(s) Oral at bedtime  clopidogrel Tablet 75 milliGRAM(s) Oral daily  haloperidol    Injectable 1 milliGRAM(s) IntraMuscular every 8 hours PRN  heparin   Injectable 5000 Unit(s) SubCutaneous every 8 hours  insulin glargine Injectable (LANTUS) 12 Unit(s) SubCutaneous at bedtime  insulin lispro (ADMELOG) corrective regimen sliding scale   SubCutaneous three times a day before meals  insulin lispro (ADMELOG) corrective regimen sliding scale   SubCutaneous at bedtime  insulin lispro Injectable (ADMELOG) 5 Unit(s) SubCutaneous before breakfast  insulin lispro Injectable (ADMELOG) 5 Unit(s) SubCutaneous before lunch  insulin lispro Injectable (ADMELOG) 5 Unit(s) SubCutaneous before dinner  metoprolol succinate ER 12.5 milliGRAM(s) Oral daily  risperiDONE   Tablet 0.5 milliGRAM(s) Oral two times a day      Allergies    ertapenem (Urticaria)  Purell (Rash)    Intolerances            Vital Signs Last 24 Hrs  T(C): 36.4 (23 Apr 2021 04:56), Max: 36.7 (22 Apr 2021 12:00)  T(F): 97.6 (23 Apr 2021 04:56), Max: 98 (22 Apr 2021 12:00)  HR: 108 (23 Apr 2021 07:42) (89 - 130)  BP: 108/58 (23 Apr 2021 04:56) (104/67 - 119/72)  BP(mean): --  RR: 19 (23 Apr 2021 04:56) (18 - 20)  SpO2: 96% (23 Apr 2021 07:42) (93% - 98%)    REVIEW OF SYSTEMS:  Very limited but feels ok no significant numbness in feet     PHYSICAL EXAMINATION:  .  HEENT:  Head:  Normocephalic, atraumatic.  Eyes:  No scleral icterus.  Ears:  Hearing appeared to be intact.  NECK:  Supple.  CARDIOVASCULAR:  S1 and S2 heard.  RESPIRATORY:  Good air entry bilaterally.  ABDOMEN:  Soft, nontender.  EXTREMITIES:  No clubbing or cyanosis were noted.      NEUROLOGIC:  The patient is awake and alert.   Able to say daughter's name.  Year and month was unknown.  was able to tell number of finger in each hand   Extraocular movements were intact.  Speech was fluent.  Motor:  Bilateral upper 4/5.  Bilateral lower extremities with plantar stimulation, slight flexation at the hip and knee, would say overall 3-/5.  The patient would follow simple commands.              LABS:  CBC Full  -  ( 22 Apr 2021 10:28 )  WBC Count : 12.72 K/uL  RBC Count : 3.56 M/uL  Hemoglobin : 10.2 g/dL  Hematocrit : 32.7 %  Platelet Count - Automated : 404 K/uL  Mean Cell Volume : 91.9 fl  Mean Cell Hemoglobin : 28.7 pg  Mean Cell Hemoglobin Concentration : 31.2 gm/dL  Auto Neutrophil # : x  Auto Lymphocyte # : x  Auto Monocyte # : x  Auto Eosinophil # : x  Auto Basophil # : x  Auto Neutrophil % : x  Auto Lymphocyte % : x  Auto Monocyte % : x  Auto Eosinophil % : x  Auto Basophil % : x      04-22    137  |  100  |  54<H>  ----------------------------<  162<H>  4.2   |  26  |  7.10<H>    Ca    8.8      22 Apr 2021 10:28      Hemoglobin A1C:       Vitamin B12         RADIOLOGY    ANALYSIS AND PLAN:  This is a 71-year-old with episode of altered mental status.    For episode of altered mental status, suspect most likely this is metabolic in nature, questionable secondary to any type of hospital induced delirium psychosis  mental status is better    MRI imaging of the brain noted CVA  For history of diabetes, strict control of blood sugars.  For history of neuropathy, the patient does have elevated renal function.  I would recommend Nephrology followup in regards to possibly adjustment dose of the patient's gabapentin decreased to 100 tid no new complaints   For history of hypertension, monitor systolic blood pressure.  ct chest noted antibiotics as needed   For paroxysmal atrial fibrillation, telemetry evaluation.  Cardiology followup as needed  for CVA will dc asa change to plavix  if patient  does have  AFIB would recommends dc plavix and start AC after 2 weeks form stroke onset  do to size of CVA  echo no intracardiac mass,thrombus or vegetations and  tumor.  more interactive today   physical therapy as tolerated   monitor oral intake as needed    Spoke with daughter, Frederick; her telephone number is [phone] 4/19/2021  went to [place] today 4/23/2021 use her to translate in the past

## 2021-04-23 NOTE — DISCHARGE NOTE PROVIDER - NSDCCPCAREPLAN_GEN_ALL_CORE_FT
PRINCIPAL DISCHARGE DIAGNOSIS  Diagnosis: SOB (shortness of breath)  Assessment and Plan of Treatment: follow up with rehab MD      SECONDARY DISCHARGE DIAGNOSES  Diagnosis: ESRD (end stage renal disease) on dialysis  Assessment and Plan of Treatment: GIGI since 05/2018    Diagnosis: Hyperkalemia  Assessment and Plan of Treatment:

## 2021-04-23 NOTE — DISCHARGE NOTE PROVIDER - CARE PROVIDER_API CALL
Kristy Sotomayor)  Internal Medicine  [address] [place] [address]  Phone: [phone]  Fax: [phone]  Follow Up Time:     Odilon Ahuja)  Neurology  [address]  [place] [address]  Phone: [phone]  Fax: [phone])-  Follow Up Time:     Jaden Albarran  MEDICINE  [address], [address]  [place] [address]  Phone: [phone]  Fax: [phone]  Follow Up Time:     Pal Howard)  Cardiology Medicine  [address], [address]  [place] [address]  Phone: [phone]  Fax: [phone]  Follow Up Time:

## 2021-04-23 NOTE — DISCHARGE NOTE PROVIDER - NSDCMRMEDTOKEN_GEN_ALL_CORE_FT
acetaminophen 325 mg oral tablet: 2 tab(s) orally every 6 hours, As needed, Mild Pain (1 - 3)  atorvastatin 80 mg oral tablet: 1 tab(s) orally once a day (at bedtime)  Basaglar KwikPen 100 units/mL subcutaneous solution: 20 unit(s) subcutaneous once a day (at bedtime)    *As per patient and daughter in law, she takes 10units in the morning and then 10 units at night.  clopidogrel 75 mg oral tablet: 1 tab(s) orally once a day  heparin 5000 units/mL injectable solution:  injectable every 12 hours  metoprolol tartrate 50 mg oral tablet: 1 tab(s) orally 2 times a day  Nephro-Jose Daniel oral tablet: 0.8 mg 1 tab(s) orally once a day  NovoLOG 100 units/mL injectable solution: 5 unit(s) injectable 3 times a day  risperiDONE 0.25 mg oral tablet: 1 tab(s) orally 3 times a day  Velphoro 2500 mg (500 mg elemental iron) oral tablet, chewable: 1 tab(s) orally 3 times a day (with meals)    *Patient typically takes BID as per daughter

## 2021-04-23 NOTE — PROGRESS NOTE ADULT - SUBJECTIVE AND OBJECTIVE BOX
Patient is a 71y old  Female who presents with a chief complaint of   Patient seen in follow up for ESRD on HD, SOB.        PAST MEDICAL HISTORY:  Diabetes    Hypertension    Myocardial infarction    Pneumonia    Hypertension    CRF (chronic renal failure)    Diabetes    CAD (coronary artery disease)    ESRD (end stage renal disease) on dialysis    Liver abscess    Acute urinary retention    Cholecystitis with cholangitis    Chronic pancreatitis    PAF (paroxysmal atrial fibrillation)    H/O diabetic neuropathy      MEDICATIONS  (STANDING):  albuterol/ipratropium for Nebulization 3 milliLiter(s) Nebulizer every 6 hours  atorvastatin 10 milliGRAM(s) Oral at bedtime  clopidogrel Tablet 75 milliGRAM(s) Oral daily  heparin   Injectable 5000 Unit(s) SubCutaneous every 8 hours  insulin glargine Injectable (LANTUS) 12 Unit(s) SubCutaneous at bedtime  insulin lispro (ADMELOG) corrective regimen sliding scale   SubCutaneous three times a day before meals  insulin lispro (ADMELOG) corrective regimen sliding scale   SubCutaneous at bedtime  insulin lispro Injectable (ADMELOG) 5 Unit(s) SubCutaneous before breakfast  insulin lispro Injectable (ADMELOG) 5 Unit(s) SubCutaneous before lunch  insulin lispro Injectable (ADMELOG) 5 Unit(s) SubCutaneous before dinner  metoprolol succinate ER 12.5 milliGRAM(s) Oral daily  risperiDONE   Tablet 0.25 milliGRAM(s) Oral three times a day    MEDICATIONS  (PRN):  acetaminophen   Tablet .. 650 milliGRAM(s) Oral every 6 hours PRN Mild Pain (1 - 3)  haloperidol    Injectable 1 milliGRAM(s) IntraMuscular every 8 hours PRN Agitation    T(C): 36.5 (04-23-21 @ 12:34), Max: 36.8 (04-21-21 @ 20:00)  HR: 64 (04-23-21 @ 12:34) (64 - 130)  BP: 127/68 (04-23-21 @ 12:34) (104/67 - 160/68)  RR: 18 (04-23-21 @ 12:34)  SpO2: 96% (04-23-21 @ 12:34)  Wt(kg): --  I&O's Detail    22 Apr 2021 07:01  -  23 Apr 2021 07:00  --------------------------------------------------------  IN:  Total IN: 0 mL    OUT:    Other (mL): 629 mL  Total OUT: 629 mL    Total NET: -629 mL                PHYSICAL EXAM:  General: No distress  Respiratory: b/l air entry  Cardiovascular: S1 S2  Gastrointestinal: soft  Extremities:  edema, left AVF                            LABORATORY:                        10.2   12.72 )-----------( 404      ( 22 Apr 2021 10:28 )             32.7     04-22    137  |  100  |  54<H>  ----------------------------<  162<H>  4.2   |  26  |  7.10<H>    Ca    8.8      22 Apr 2021 10:28      Sodium, Serum: 137 mmol/L (04-22 @ 10:28)    Potassium, Serum: 4.2 mmol/L (04-22 @ 10:28)    Hemoglobin: 10.2 g/dL (04-22 @ 10:28)  Hemoglobin: 10.3 g/dL (04-21 @ 14:18)    Creatinine, Serum 7.10 (04-22 @ 10:28)

## 2021-04-28 ENCOUNTER — INPATIENT (INPATIENT)
Facility: HOSPITAL | Age: 71
LOS: 25 days | Discharge: HOME | DRG: 64 | End: 2021-05-24
Attending: STUDENT IN AN ORGANIZED HEALTH CARE EDUCATION/TRAINING PROGRAM | Admitting: PSYCHIATRY & NEUROLOGY
Payer: MEDICARE

## 2021-04-28 ENCOUNTER — EMERGENCY (EMERGENCY)
Facility: HOSPITAL | Age: 71
LOS: 1 days | Discharge: ANOTHER HOSPITAL | End: 2021-04-28
Attending: EMERGENCY MEDICINE | Admitting: EMERGENCY MEDICINE
Payer: MEDICARE

## 2021-04-28 VITALS
HEIGHT: 62 IN | SYSTOLIC BLOOD PRESSURE: 154 MMHG | HEART RATE: 112 BPM | DIASTOLIC BLOOD PRESSURE: 65 MMHG | RESPIRATION RATE: 18 BRPM

## 2021-04-28 VITALS
HEIGHT: 62 IN | RESPIRATION RATE: 16 BRPM | OXYGEN SATURATION: 98 % | HEART RATE: 104 BPM | SYSTOLIC BLOOD PRESSURE: 110 MMHG | DIASTOLIC BLOOD PRESSURE: 69 MMHG | TEMPERATURE: 97 F

## 2021-04-28 VITALS
OXYGEN SATURATION: 98 % | HEART RATE: 105 BPM | RESPIRATION RATE: 16 BRPM | DIASTOLIC BLOOD PRESSURE: 59 MMHG | SYSTOLIC BLOOD PRESSURE: 99 MMHG

## 2021-04-28 DIAGNOSIS — Z98.890 OTHER SPECIFIED POSTPROCEDURAL STATES: Chronic | ICD-10-CM

## 2021-04-28 DIAGNOSIS — H26.9 UNSPECIFIED CATARACT: Chronic | ICD-10-CM

## 2021-04-28 DIAGNOSIS — Z90.710 ACQUIRED ABSENCE OF BOTH CERVIX AND UTERUS: Chronic | ICD-10-CM

## 2021-04-28 DIAGNOSIS — I62.00 NONTRAUMATIC SUBDURAL HEMORRHAGE, UNSPECIFIED: ICD-10-CM

## 2021-04-28 LAB
ALBUMIN SERPL ELPH-MCNC: 2.8 G/DL — LOW (ref 3.3–5)
ALBUMIN SERPL ELPH-MCNC: 3.3 G/DL — SIGNIFICANT CHANGE UP (ref 3.3–5.2)
ALP SERPL-CCNC: 88 U/L — SIGNIFICANT CHANGE UP (ref 40–120)
ALP SERPL-CCNC: 92 U/L — SIGNIFICANT CHANGE UP (ref 40–120)
ALT FLD-CCNC: 7 U/L — LOW (ref 12–78)
ALT FLD-CCNC: 8 U/L — SIGNIFICANT CHANGE UP
ANION GAP SERPL CALC-SCNC: 15 MMOL/L — SIGNIFICANT CHANGE UP (ref 5–17)
ANION GAP SERPL CALC-SCNC: 26 MMOL/L — HIGH (ref 5–17)
ANION GAP SERPL CALC-SCNC: 31 MMOL/L — HIGH (ref 5–17)
ANISOCYTOSIS BLD QL: SLIGHT — SIGNIFICANT CHANGE UP
AST SERPL-CCNC: 22 U/L — SIGNIFICANT CHANGE UP
AST SERPL-CCNC: 24 U/L — SIGNIFICANT CHANGE UP (ref 15–37)
BASE EXCESS BLDA CALC-SCNC: -5.1 MMOL/L — LOW (ref -2–2)
BASOPHILS # BLD AUTO: 0.05 K/UL — SIGNIFICANT CHANGE UP (ref 0–0.2)
BASOPHILS # BLD AUTO: 0.29 K/UL — HIGH (ref 0–0.2)
BASOPHILS NFR BLD AUTO: 0.2 % — SIGNIFICANT CHANGE UP (ref 0–2)
BASOPHILS NFR BLD AUTO: 0.9 % — SIGNIFICANT CHANGE UP (ref 0–2)
BILIRUB SERPL-MCNC: 0.4 MG/DL — SIGNIFICANT CHANGE UP (ref 0.4–2)
BILIRUB SERPL-MCNC: 0.6 MG/DL — SIGNIFICANT CHANGE UP (ref 0.2–1.2)
BLOOD GAS COMMENTS ARTERIAL: SIGNIFICANT CHANGE UP
BLOOD GAS COMMENTS ARTERIAL: SIGNIFICANT CHANGE UP
BUN SERPL-MCNC: 48 MG/DL — HIGH (ref 8–20)
BUN SERPL-MCNC: 50 MG/DL — HIGH (ref 7–23)
BUN SERPL-MCNC: 60 MG/DL — HIGH (ref 8–20)
CALCIUM SERPL-MCNC: 7.6 MG/DL — LOW (ref 8.6–10.2)
CALCIUM SERPL-MCNC: 9 MG/DL — SIGNIFICANT CHANGE UP (ref 8.5–10.1)
CALCIUM SERPL-MCNC: 9.2 MG/DL — SIGNIFICANT CHANGE UP (ref 8.6–10.2)
CHLORIDE SERPL-SCNC: 82 MMOL/L — LOW (ref 98–107)
CHLORIDE SERPL-SCNC: 87 MMOL/L — LOW (ref 98–107)
CHLORIDE SERPL-SCNC: 95 MMOL/L — LOW (ref 96–108)
CK MB CFR SERPL CALC: 1.7 NG/ML — SIGNIFICANT CHANGE UP (ref 0–3.6)
CO2 SERPL-SCNC: 14 MMOL/L — LOW (ref 22–29)
CO2 SERPL-SCNC: 18 MMOL/L — LOW (ref 22–29)
CO2 SERPL-SCNC: 26 MMOL/L — SIGNIFICANT CHANGE UP (ref 22–31)
CREAT SERPL-MCNC: 6.2 MG/DL — HIGH (ref 0.5–1.3)
CREAT SERPL-MCNC: 6.6 MG/DL — HIGH (ref 0.5–1.3)
CREAT SERPL-MCNC: 7.36 MG/DL — HIGH (ref 0.5–1.3)
EOSINOPHIL # BLD AUTO: 0.17 K/UL — SIGNIFICANT CHANGE UP (ref 0–0.5)
EOSINOPHIL # BLD AUTO: 0.29 K/UL — SIGNIFICANT CHANGE UP (ref 0–0.5)
EOSINOPHIL NFR BLD AUTO: 0.8 % — SIGNIFICANT CHANGE UP (ref 0–6)
EOSINOPHIL NFR BLD AUTO: 0.9 % — SIGNIFICANT CHANGE UP (ref 0–6)
GAS PNL BLDA: SIGNIFICANT CHANGE UP
GAS PNL BLDA: SIGNIFICANT CHANGE UP
GIANT PLATELETS BLD QL SMEAR: PRESENT — SIGNIFICANT CHANGE UP
GLUCOSE SERPL-MCNC: 168 MG/DL — HIGH (ref 70–99)
GLUCOSE SERPL-MCNC: 248 MG/DL — HIGH (ref 70–99)
GLUCOSE SERPL-MCNC: 329 MG/DL — HIGH (ref 70–99)
HCO3 BLDA-SCNC: 20 MMOL/L — SIGNIFICANT CHANGE UP (ref 20–26)
HCT VFR BLD CALC: 37.2 % — SIGNIFICANT CHANGE UP (ref 34.5–45)
HCT VFR BLD CALC: 39.2 % — SIGNIFICANT CHANGE UP (ref 34.5–45)
HCT VFR BLD CALC: 39.6 % — SIGNIFICANT CHANGE UP (ref 34.5–45)
HGB BLD-MCNC: 11.5 G/DL — SIGNIFICANT CHANGE UP (ref 11.5–15.5)
HGB BLD-MCNC: 12 G/DL — SIGNIFICANT CHANGE UP (ref 11.5–15.5)
HGB BLD-MCNC: 12.6 G/DL — SIGNIFICANT CHANGE UP (ref 11.5–15.5)
HOROWITZ INDEX BLDA+IHG-RTO: SIGNIFICANT CHANGE UP
IMM GRANULOCYTES NFR BLD AUTO: 1.4 % — SIGNIFICANT CHANGE UP (ref 0–1.5)
LACTATE SERPL-SCNC: 1.2 MMOL/L — SIGNIFICANT CHANGE UP (ref 0.7–2)
LYMPHOCYTES # BLD AUTO: 1.98 K/UL — SIGNIFICANT CHANGE UP (ref 1–3.3)
LYMPHOCYTES # BLD AUTO: 10.2 % — LOW (ref 13–44)
LYMPHOCYTES # BLD AUTO: 2.14 K/UL — SIGNIFICANT CHANGE UP (ref 1–3.3)
LYMPHOCYTES # BLD AUTO: 6.2 % — LOW (ref 13–44)
MACROCYTES BLD QL: SLIGHT — SIGNIFICANT CHANGE UP
MAGNESIUM SERPL-MCNC: 2.1 MG/DL — SIGNIFICANT CHANGE UP (ref 1.8–2.6)
MAGNESIUM SERPL-MCNC: 2.5 MG/DL — SIGNIFICANT CHANGE UP (ref 1.6–2.6)
MANUAL SMEAR VERIFICATION: SIGNIFICANT CHANGE UP
MCHC RBC-ENTMCNC: 28.6 PG — SIGNIFICANT CHANGE UP (ref 27–34)
MCHC RBC-ENTMCNC: 28.7 PG — SIGNIFICANT CHANGE UP (ref 27–34)
MCHC RBC-ENTMCNC: 30.3 GM/DL — LOW (ref 32–36)
MCHC RBC-ENTMCNC: 30.9 GM/DL — LOW (ref 32–36)
MCHC RBC-ENTMCNC: 32.1 GM/DL — SIGNIFICANT CHANGE UP (ref 32–36)
MCHC RBC-ENTMCNC: 32.2 PG — SIGNIFICANT CHANGE UP (ref 27–34)
MCV RBC AUTO: 100.3 FL — HIGH (ref 80–100)
MCV RBC AUTO: 92.5 FL — SIGNIFICANT CHANGE UP (ref 80–100)
MCV RBC AUTO: 94.7 FL — SIGNIFICANT CHANGE UP (ref 80–100)
METAMYELOCYTES # FLD: 1.8 % — HIGH (ref 0–0)
MONOCYTES # BLD AUTO: 0 K/UL — SIGNIFICANT CHANGE UP (ref 0–0.9)
MONOCYTES # BLD AUTO: 0.92 K/UL — HIGH (ref 0–0.9)
MONOCYTES NFR BLD AUTO: 0 % — LOW (ref 2–14)
MONOCYTES NFR BLD AUTO: 4.4 % — SIGNIFICANT CHANGE UP (ref 2–14)
MYELOCYTES NFR BLD: 0.9 % — HIGH (ref 0–0)
NEUTROPHILS # BLD AUTO: 17.45 K/UL — HIGH (ref 1.8–7.4)
NEUTROPHILS # BLD AUTO: 28.58 K/UL — HIGH (ref 1.8–7.4)
NEUTROPHILS NFR BLD AUTO: 83 % — HIGH (ref 43–77)
NEUTROPHILS NFR BLD AUTO: 87.5 % — HIGH (ref 43–77)
NEUTS BAND # BLD: 1.8 % — SIGNIFICANT CHANGE UP (ref 0–8)
NRBC # BLD: 0 /100 WBCS — SIGNIFICANT CHANGE UP (ref 0–0)
PCO2 BLDA: 36 MMHG — SIGNIFICANT CHANGE UP (ref 35–45)
PH BLDA: 7.35 — SIGNIFICANT CHANGE UP (ref 7.35–7.45)
PHOSPHATE SERPL-MCNC: 6.9 MG/DL — HIGH (ref 2.4–4.7)
PHOSPHATE SERPL-MCNC: 8.4 MG/DL — HIGH (ref 2.4–4.7)
PLAT MORPH BLD: NORMAL — SIGNIFICANT CHANGE UP
PLATELET # BLD AUTO: 444 K/UL — HIGH (ref 150–400)
PLATELET # BLD AUTO: 480 K/UL — HIGH (ref 150–400)
PLATELET # BLD AUTO: 492 K/UL — HIGH (ref 150–400)
PO2 BLDA: 117 MMHG — HIGH (ref 83–108)
POIKILOCYTOSIS BLD QL AUTO: SLIGHT — SIGNIFICANT CHANGE UP
POTASSIUM SERPL-MCNC: 4 MMOL/L — SIGNIFICANT CHANGE UP (ref 3.5–5.3)
POTASSIUM SERPL-MCNC: 4.2 MMOL/L — SIGNIFICANT CHANGE UP (ref 3.5–5.3)
POTASSIUM SERPL-MCNC: 5.1 MMOL/L — SIGNIFICANT CHANGE UP (ref 3.5–5.3)
POTASSIUM SERPL-SCNC: 4 MMOL/L — SIGNIFICANT CHANGE UP (ref 3.5–5.3)
POTASSIUM SERPL-SCNC: 4.2 MMOL/L — SIGNIFICANT CHANGE UP (ref 3.5–5.3)
POTASSIUM SERPL-SCNC: 5.1 MMOL/L — SIGNIFICANT CHANGE UP (ref 3.5–5.3)
PROT SERPL-MCNC: 7.5 G/DL — SIGNIFICANT CHANGE UP (ref 6.6–8.7)
PROT SERPL-MCNC: 8.2 G/DL — SIGNIFICANT CHANGE UP (ref 6–8.3)
RBC # BLD: 3.91 M/UL — SIGNIFICANT CHANGE UP (ref 3.8–5.2)
RBC # BLD: 4.02 M/UL — SIGNIFICANT CHANGE UP (ref 3.8–5.2)
RBC # BLD: 4.18 M/UL — SIGNIFICANT CHANGE UP (ref 3.8–5.2)
RBC # FLD: 13.6 % — SIGNIFICANT CHANGE UP (ref 10.3–14.5)
RBC # FLD: 13.7 % — SIGNIFICANT CHANGE UP (ref 10.3–14.5)
RBC # FLD: 13.8 % — SIGNIFICANT CHANGE UP (ref 10.3–14.5)
RBC BLD AUTO: ABNORMAL
SAO2 % BLDA: 97 % — SIGNIFICANT CHANGE UP (ref 95–99)
SARS-COV-2 RNA SPEC QL NAA+PROBE: SIGNIFICANT CHANGE UP
SMUDGE CELLS # BLD: PRESENT — SIGNIFICANT CHANGE UP
SODIUM SERPL-SCNC: 122 MMOL/L — LOW (ref 135–145)
SODIUM SERPL-SCNC: 136 MMOL/L — SIGNIFICANT CHANGE UP (ref 135–145)
SODIUM SERPL-SCNC: 136 MMOL/L — SIGNIFICANT CHANGE UP (ref 135–145)
TROPONIN I SERPL-MCNC: 0.18 NG/ML — HIGH (ref 0.01–0.04)
TROPONIN T SERPL-MCNC: 0.2 NG/ML — HIGH (ref 0–0.06)
TROPONIN T SERPL-MCNC: 0.3 NG/ML — HIGH (ref 0–0.06)
WBC # BLD: 21.02 K/UL — HIGH (ref 3.8–10.5)
WBC # BLD: 30.65 K/UL — HIGH (ref 3.8–10.5)
WBC # BLD: 32 K/UL — HIGH (ref 3.8–10.5)
WBC # FLD AUTO: 21.02 K/UL — HIGH (ref 3.8–10.5)
WBC # FLD AUTO: 30.65 K/UL — HIGH (ref 3.8–10.5)
WBC # FLD AUTO: 32 K/UL — HIGH (ref 3.8–10.5)

## 2021-04-28 PROCEDURE — 74176 CT ABD & PELVIS W/O CONTRAST: CPT

## 2021-04-28 PROCEDURE — 93010 ELECTROCARDIOGRAM REPORT: CPT

## 2021-04-28 PROCEDURE — 31500 INSERT EMERGENCY AIRWAY: CPT

## 2021-04-28 PROCEDURE — U0003: CPT

## 2021-04-28 PROCEDURE — 71045 X-RAY EXAM CHEST 1 VIEW: CPT

## 2021-04-28 PROCEDURE — 76937 US GUIDE VASCULAR ACCESS: CPT | Mod: 26

## 2021-04-28 PROCEDURE — 99291 CRITICAL CARE FIRST HOUR: CPT

## 2021-04-28 PROCEDURE — 71045 X-RAY EXAM CHEST 1 VIEW: CPT | Mod: 26

## 2021-04-28 PROCEDURE — U0005: CPT

## 2021-04-28 PROCEDURE — 87040 BLOOD CULTURE FOR BACTERIA: CPT

## 2021-04-28 PROCEDURE — 96365 THER/PROPH/DIAG IV INF INIT: CPT | Mod: XU

## 2021-04-28 PROCEDURE — 36620 INSERTION CATHETER ARTERY: CPT

## 2021-04-28 PROCEDURE — 70450 CT HEAD/BRAIN W/O DYE: CPT

## 2021-04-28 PROCEDURE — 71250 CT THORAX DX C-: CPT

## 2021-04-28 PROCEDURE — 70450 CT HEAD/BRAIN W/O DYE: CPT | Mod: 26,77

## 2021-04-28 PROCEDURE — 70450 CT HEAD/BRAIN W/O DYE: CPT | Mod: 26,MA

## 2021-04-28 PROCEDURE — 82553 CREATINE MB FRACTION: CPT

## 2021-04-28 PROCEDURE — 96376 TX/PRO/DX INJ SAME DRUG ADON: CPT | Mod: XU

## 2021-04-28 PROCEDURE — 93005 ELECTROCARDIOGRAM TRACING: CPT

## 2021-04-28 PROCEDURE — 71250 CT THORAX DX C-: CPT | Mod: 26,MA

## 2021-04-28 PROCEDURE — 36415 COLL VENOUS BLD VENIPUNCTURE: CPT

## 2021-04-28 PROCEDURE — 74176 CT ABD & PELVIS W/O CONTRAST: CPT | Mod: 26,MA

## 2021-04-28 PROCEDURE — 82962 GLUCOSE BLOOD TEST: CPT

## 2021-04-28 PROCEDURE — 93010 ELECTROCARDIOGRAM REPORT: CPT | Mod: 77

## 2021-04-28 PROCEDURE — 85025 COMPLETE CBC W/AUTO DIFF WBC: CPT

## 2021-04-28 PROCEDURE — 96375 TX/PRO/DX INJ NEW DRUG ADDON: CPT | Mod: XU

## 2021-04-28 PROCEDURE — 83605 ASSAY OF LACTIC ACID: CPT

## 2021-04-28 PROCEDURE — 80053 COMPREHEN METABOLIC PANEL: CPT

## 2021-04-28 PROCEDURE — 99291 CRITICAL CARE FIRST HOUR: CPT | Mod: 25

## 2021-04-28 PROCEDURE — 71045 X-RAY EXAM CHEST 1 VIEW: CPT | Mod: 26,77

## 2021-04-28 PROCEDURE — 84484 ASSAY OF TROPONIN QUANT: CPT

## 2021-04-28 RX ORDER — DILTIAZEM HCL 120 MG
5 CAPSULE, EXT RELEASE 24 HR ORAL
Qty: 125 | Refills: 0 | Status: DISCONTINUED | OUTPATIENT
Start: 2021-04-28 | End: 2021-05-01

## 2021-04-28 RX ORDER — DIPHENHYDRAMINE HCL 50 MG
50 CAPSULE ORAL ONCE
Refills: 0 | Status: DISCONTINUED | OUTPATIENT
Start: 2021-04-28 | End: 2021-04-28

## 2021-04-28 RX ORDER — SODIUM CHLORIDE 9 MG/ML
1000 INJECTION INTRAMUSCULAR; INTRAVENOUS; SUBCUTANEOUS
Refills: 0 | Status: DISCONTINUED | OUTPATIENT
Start: 2021-04-28 | End: 2021-04-29

## 2021-04-28 RX ORDER — INSULIN LISPRO 100/ML
VIAL (ML) SUBCUTANEOUS EVERY 6 HOURS
Refills: 0 | Status: DISCONTINUED | OUTPATIENT
Start: 2021-04-28 | End: 2021-05-15

## 2021-04-28 RX ORDER — LABETALOL HCL 100 MG
10 TABLET ORAL
Refills: 0 | Status: DISCONTINUED | OUTPATIENT
Start: 2021-04-28 | End: 2021-05-03

## 2021-04-28 RX ORDER — SODIUM CHLORIDE 9 MG/ML
1000 INJECTION INTRAMUSCULAR; INTRAVENOUS; SUBCUTANEOUS ONCE
Refills: 0 | Status: COMPLETED | OUTPATIENT
Start: 2021-04-28 | End: 2021-04-28

## 2021-04-28 RX ORDER — PROPOFOL 10 MG/ML
10 INJECTION, EMULSION INTRAVENOUS
Qty: 1000 | Refills: 0 | Status: DISCONTINUED | OUTPATIENT
Start: 2021-04-28 | End: 2021-05-01

## 2021-04-28 RX ORDER — SUCCINYLCHOLINE CHLORIDE 100 MG/5ML
100 SYRINGE (ML) INTRAVENOUS ONCE
Refills: 0 | Status: DISCONTINUED | OUTPATIENT
Start: 2021-04-28 | End: 2021-04-28

## 2021-04-28 RX ORDER — GLUCAGON INJECTION, SOLUTION 0.5 MG/.1ML
1 INJECTION, SOLUTION SUBCUTANEOUS ONCE
Refills: 0 | Status: DISCONTINUED | OUTPATIENT
Start: 2021-04-28 | End: 2021-05-23

## 2021-04-28 RX ORDER — DEXTROSE 50 % IN WATER 50 %
25 SYRINGE (ML) INTRAVENOUS ONCE
Refills: 0 | Status: DISCONTINUED | OUTPATIENT
Start: 2021-04-28 | End: 2021-05-04

## 2021-04-28 RX ORDER — PIPERACILLIN AND TAZOBACTAM 4; .5 G/20ML; G/20ML
3.38 INJECTION, POWDER, LYOPHILIZED, FOR SOLUTION INTRAVENOUS ONCE
Refills: 0 | Status: COMPLETED | OUTPATIENT
Start: 2021-04-28 | End: 2021-04-28

## 2021-04-28 RX ORDER — ETOMIDATE 2 MG/ML
20 INJECTION INTRAVENOUS ONCE
Refills: 0 | Status: COMPLETED | OUTPATIENT
Start: 2021-04-28 | End: 2021-04-28

## 2021-04-28 RX ORDER — LEVETIRACETAM 250 MG/1
250 TABLET, FILM COATED ORAL EVERY 12 HOURS
Refills: 0 | Status: DISCONTINUED | OUTPATIENT
Start: 2021-04-28 | End: 2021-04-29

## 2021-04-28 RX ORDER — FENTANYL CITRATE 50 UG/ML
25 INJECTION INTRAVENOUS
Refills: 0 | Status: DISCONTINUED | OUTPATIENT
Start: 2021-04-28 | End: 2021-05-01

## 2021-04-28 RX ORDER — DILTIAZEM HCL 120 MG
10 CAPSULE, EXT RELEASE 24 HR ORAL ONCE
Refills: 0 | Status: COMPLETED | OUTPATIENT
Start: 2021-04-28 | End: 2021-04-28

## 2021-04-28 RX ORDER — SODIUM CHLORIDE 9 MG/ML
1000 INJECTION, SOLUTION INTRAVENOUS
Refills: 0 | Status: DISCONTINUED | OUTPATIENT
Start: 2021-04-28 | End: 2021-05-23

## 2021-04-28 RX ORDER — DEXTROSE 50 % IN WATER 50 %
25 SYRINGE (ML) INTRAVENOUS ONCE
Refills: 0 | Status: DISCONTINUED | OUTPATIENT
Start: 2021-04-28 | End: 2021-05-23

## 2021-04-28 RX ORDER — DEXTROSE 50 % IN WATER 50 %
12.5 SYRINGE (ML) INTRAVENOUS ONCE
Refills: 0 | Status: DISCONTINUED | OUTPATIENT
Start: 2021-04-28 | End: 2021-05-23

## 2021-04-28 RX ORDER — VANCOMYCIN HCL 1 G
1000 VIAL (EA) INTRAVENOUS ONCE
Refills: 0 | Status: COMPLETED | OUTPATIENT
Start: 2021-04-28 | End: 2021-04-28

## 2021-04-28 RX ORDER — ROCURONIUM BROMIDE 10 MG/ML
70 VIAL (ML) INTRAVENOUS ONCE
Refills: 0 | Status: COMPLETED | OUTPATIENT
Start: 2021-04-28 | End: 2021-04-28

## 2021-04-28 RX ORDER — CHLORHEXIDINE GLUCONATE 213 G/1000ML
15 SOLUTION TOPICAL EVERY 12 HOURS
Refills: 0 | Status: DISCONTINUED | OUTPATIENT
Start: 2021-04-28 | End: 2021-05-01

## 2021-04-28 RX ORDER — HYDRALAZINE HCL 50 MG
10 TABLET ORAL
Refills: 0 | Status: DISCONTINUED | OUTPATIENT
Start: 2021-04-28 | End: 2021-05-03

## 2021-04-28 RX ORDER — DEXTROSE 50 % IN WATER 50 %
15 SYRINGE (ML) INTRAVENOUS ONCE
Refills: 0 | Status: DISCONTINUED | OUTPATIENT
Start: 2021-04-28 | End: 2021-05-23

## 2021-04-28 RX ORDER — NOREPINEPHRINE BITARTRATE/D5W 8 MG/250ML
0.05 PLASTIC BAG, INJECTION (ML) INTRAVENOUS
Qty: 8 | Refills: 0 | Status: DISCONTINUED | OUTPATIENT
Start: 2021-04-28 | End: 2021-05-01

## 2021-04-28 RX ORDER — NOREPINEPHRINE BITARTRATE/D5W 8 MG/250ML
0.05 PLASTIC BAG, INJECTION (ML) INTRAVENOUS
Qty: 8 | Refills: 0 | Status: DISCONTINUED | OUTPATIENT
Start: 2021-04-28 | End: 2021-05-03

## 2021-04-28 RX ORDER — PANTOPRAZOLE SODIUM 20 MG/1
40 TABLET, DELAYED RELEASE ORAL DAILY
Refills: 0 | Status: DISCONTINUED | OUTPATIENT
Start: 2021-04-28 | End: 2021-04-30

## 2021-04-28 RX ORDER — DEXMEDETOMIDINE HYDROCHLORIDE IN 0.9% SODIUM CHLORIDE 4 UG/ML
0.1 INJECTION INTRAVENOUS
Qty: 200 | Refills: 0 | Status: DISCONTINUED | OUTPATIENT
Start: 2021-04-28 | End: 2021-05-01

## 2021-04-28 RX ADMIN — PROPOFOL 5.17 MICROGRAM(S)/KG/MIN: 10 INJECTION, EMULSION INTRAVENOUS at 15:38

## 2021-04-28 RX ADMIN — DEXMEDETOMIDINE HYDROCHLORIDE IN 0.9% SODIUM CHLORIDE 2 MICROGRAM(S)/KG/HR: 4 INJECTION INTRAVENOUS at 20:16

## 2021-04-28 RX ADMIN — Medication 250 MILLIGRAM(S): at 14:00

## 2021-04-28 RX ADMIN — Medication 70 MILLIGRAM(S): at 15:12

## 2021-04-28 RX ADMIN — Medication 8.08 MICROGRAM(S)/KG/MIN: at 15:38

## 2021-04-28 RX ADMIN — Medication 1000 MILLIGRAM(S): at 14:02

## 2021-04-28 RX ADMIN — Medication 5 MG/HR: at 14:48

## 2021-04-28 RX ADMIN — PIPERACILLIN AND TAZOBACTAM 3.38 GRAM(S): 4; .5 INJECTION, POWDER, LYOPHILIZED, FOR SOLUTION INTRAVENOUS at 13:55

## 2021-04-28 RX ADMIN — PIPERACILLIN AND TAZOBACTAM 200 GRAM(S): 4; .5 INJECTION, POWDER, LYOPHILIZED, FOR SOLUTION INTRAVENOUS at 13:15

## 2021-04-28 RX ADMIN — DEXMEDETOMIDINE HYDROCHLORIDE IN 0.9% SODIUM CHLORIDE 2 MICROGRAM(S)/KG/HR: 4 INJECTION INTRAVENOUS at 21:58

## 2021-04-28 RX ADMIN — SODIUM CHLORIDE 50 MILLILITER(S): 9 INJECTION INTRAMUSCULAR; INTRAVENOUS; SUBCUTANEOUS at 21:59

## 2021-04-28 RX ADMIN — FENTANYL CITRATE 25 MICROGRAM(S): 50 INJECTION INTRAVENOUS at 17:00

## 2021-04-28 RX ADMIN — SODIUM CHLORIDE 1000 MILLILITER(S): 9 INJECTION INTRAMUSCULAR; INTRAVENOUS; SUBCUTANEOUS at 23:00

## 2021-04-28 RX ADMIN — ETOMIDATE 20 MILLIGRAM(S): 2 INJECTION INTRAVENOUS at 15:11

## 2021-04-28 RX ADMIN — Medication 10 MILLIGRAM(S): at 14:09

## 2021-04-28 RX ADMIN — Medication 10 MILLIGRAM(S): at 13:39

## 2021-04-28 NOTE — ED PROVIDER NOTE - OBJECTIVE STATEMENT
70 y/o F with hx of DM, ESRD on HD sent in from HD after syncope/LOC/weakness 30 min into dialysis.  pt poor historian.  Pt with recent hospitalization for

## 2021-04-28 NOTE — ED ADULT NURSE NOTE - OBJECTIVE STATEMENT
Pt. received lethargic and nonverbal w/ chief complaint as per EMS of syncope during dialysis. Pt. presents w/ dialysis cath to left arm. Pt. placed on continuous cardiac monitor with continuous pulse ox. EKG performed at bedside upon arrival.

## 2021-04-28 NOTE — H&P ADULT - ASSESSMENT
A/P: 70 y/o female with PMHx CAD s/p stents '07, HTN, MI, PAF, liver abscess, s/p biliary stent with removal (11/2018; 7/2/19), chronic pancreatitis, DM2 on insulin, neuropathy, ESRD (5/2018) on HD (M/W/F) presents to [facility] as a transfer from [place] after she went to HD today and had an episode of syncope and change in mental status. Pt recently dx with large acute infarct of the left PCA. Pt was placed on Plavix. New CTH obtained today revealed evolving left posterior cerebral artery infarct with new hemorrhage. Pt transferred to [facility] hospital for NS evaluation.     Plan     Admit to NICU   Q1H neuro checks A/P: 70 y/o female with PMHx CAD s/p stents '07, HTN, MI, PAF, liver abscess, s/p biliary stent with removal (11/2018; 7/2/19), chronic pancreatitis, DM2 on insulin, neuropathy, ESRD (5/2018) on HD (M/W/F) presents to [facility] as a transfer from [place] after she went to HD today and had an episode of syncope and change in mental status. Pt recently dx with large acute infarct of the left PCA. Pt was placed on Plavix. New CTH obtained today revealed evolving left posterior cerebral artery infarct with new hemorrhage. Pt transferred to [facility] hospital for NS evaluation.     Plan:  -D/w Dr. Medrano  -Admit to NSICU  -Q1hr neuro checks  -Repeat CTH in 6 hrs (8:30 PM) or prior to transfer to ICU  -TTE  -SBP goal 110-150  -NPO  -Further medical management/supportive care per NSICU   A/P: 72 y/o female with PMHx CAD s/p stents '07, HTN, MI, PAF, liver abscess, s/p biliary stent with removal (11/2018; 7/2/19), chronic pancreatitis, DM2 on insulin, neuropathy, ESRD (5/2018) on HD (M/W/F) presents to [facility] as a transfer from [place] after she went to HD today and had an episode of syncope and change in mental status. Pt recently dx with large acute infarct of the left PCA. Pt was placed on Plavix. New CTH obtained today revealed evolving left posterior cerebral artery infarct with new hemorrhage. Pt transferred to [facility] hospital for NS evaluation.     Plan:  -D/w Dr. Medrano  -Admit to NSICU  -Q1hr neuro checks  -Repeat CTH in 6 hrs (8:30 PM) or prior to transfer to ICU  -Keppra 250 BID (renally dosed)  -Hold ACT/APT  -Transitioned from propofol to precedex   -SpO2 goal >92%, CPAP as tolerated  -TTE  -Trend troponins  -SBP goal 100-150  -NPO  -NS@50   -Recommend nephrology consult for HD  -SCDs for DVT prophylaxis   -Follow up blood culture results from [place], monitor for signs of infection  -Spoke with daughter and provided update, answered all questions - [phone]  -Further medical management/supportive care per NSICU         A/P: 72 y/o female with PMHx CAD s/p stents '07, HTN, MI, PAF, liver abscess, s/p biliary stent with removal (11/2018; 7/2/19), chronic pancreatitis, DM2 on insulin, neuropathy, ESRD (5/2018) on HD (M/W/F) presents to [facility] as a transfer from [place] after she went to HD today and had an episode of syncope and change in mental status. Pt recently dx with large acute infarct of the left PCA. Pt was placed on Plavix. New CTH obtained today revealed evolving left posterior cerebral artery infarct with new hemorrhage. Pt transferred to [facility] hospital for NS evaluation.     Plan:  -D/w Dr. Medrano  -Admit to NSICU  -Q1hr neuro checks  -Repeat CTH in 6 hrs (8:30 PM) or prior to transfer to ICU  -Keppra 250 BID (renally dosed)  -Hold ACT/APT  -Transitioned from propofol to precedex   -SpO2 goal >92%, CPAP as tolerated  -TTE  -Trend troponins  -SBP goal 100-150  -NPO  -NS@50   -Recommend nephrology consult for HD  -SCDs for DVT prophylaxis   -Follow up blood culture results from [place], monitor for signs of infection  -Spoke with daughter and provided update, answered all questions - [phone]  -Further medical management/supportive care per NSICU    --    NSGY Attg:    see above    patient seen and examined by PA staff    imaging reviewed    agree with exam and plan as above

## 2021-04-28 NOTE — H&P ADULT - ASSESSMENT
A/P: 70 y/o female with PMHx CAD s/p stents '07, HTN, MI, PAF, liver abscess, s/p biliary stent with removal (11/2018; 7/2/19), chronic pancreatitis, DM2 on insulin, neuropathy, ESRD (5/2018) on HD (M/W/F) presents to [facility] as a transfer from [place] after she went to HD today and had an episode of syncope and change in mental status. Pt recently dx with large acute infarct of the left PCA. Pt was placed on Plavix. New CTH obtained today revealed evolving left posterior cerebral artery infarct with new hemorrhage. Pt transferred to [facility] hospital for NS evaluation.     Plan     Admit to NICU   Q1H neuro checks

## 2021-04-28 NOTE — ED PROVIDER NOTE - CLINICAL SUMMARY MEDICAL DECISION MAKING FREE TEXT BOX
Pt. s/p syncope while receiving dialysis. Pt. then with worsening mental status while at [facility]. Pt. with Subdural hemorrhage. Questionable seizure like activity while at [place]. Pt. transferred over to our facility for further evaluation.

## 2021-04-28 NOTE — ED PROVIDER NOTE - CRITICAL CARE ATTENDING CONTRIBUTION TO CARE
syncope, weakness from dialysis center. pt became progressively more lethargic and altered.  left gaze.  CT scan r/o CVA + bleed.  family made aware.  Researched previous admissions and noted pt discharged on plavix and not on ASA. pt intubated for concern of airway compromise.  Transfer to [place] for neurosurgery.

## 2021-04-28 NOTE — H&P ADULT - NSHPPHYSICALEXAM_GEN_ALL_CORE
General: Well-developed, well nourished, in no acute distress.  Cardiac:  Regular rate and rhythm. No carotid bruits appreciated.  Eyes: Fundoscopic examination was deferred.  Neurologic:  - Mental Status:  Alert, awake, oriented to person, place, and time; Speech is fluent. Language is normal. Follows commands well.  Insight and knowledge appear appropriate.  Cranial Nerves II-XII:    II:  Visual acuity is normal for age ; Visual fields are full to confrontation; Pupils are equal, round, and reactive to light.  III, IV, VI:  Extraocular movements are intact without nystagmus.  V:  Facial sensation is intact in the V1-V3 distribution bilaterally.  VII:  Face is symmetric with normal eye closure and smile  VIII:  Hearing is grossly intact  IX, X, XII:  speech is clear  XI:  Head turning and shoulder shrug are intact.  - Motor:  Strength is 5/5 x 4.   There is no pronator drift.  Uppers     Delt     Bicep     Tricep     HG  R                5/5       5/5         5/5         5/5  L                5/5       5/5         5/5         5/5  Lowers      HF     KF      KE     PF     DF     EHL  R             5/5     5/5     5/5    5/5   5/5    5/5  L              5/5    5/5      5/5    5/5   5/5    5/5  - Reflexes:  2+ and symmetric at the knees.  Plantar responses flexor.  - Sensory:  Symmetric to light touch  - Coordination:  Finger-nose-finger is normal. Rapid alternating hand and foot  movements are intact. Dexterity appears normal

## 2021-04-28 NOTE — ED ADULT NURSE NOTE - NSIMPLEMENTINTERV_GEN_ALL_ED
Implemented All Fall with Harm Risk Interventions:  [place] to call system. Call bell, personal items and telephone within reach. Instruct patient to call for assistance. Room bathroom lighting operational. Non-slip footwear when patient is off stretcher. Physically safe environment: no spills, clutter or unnecessary equipment. Stretcher in lowest position, wheels locked, appropriate side rails in place. Provide visual cue, wrist band, yellow gown, etc. Monitor gait and stability. Monitor for mental status changes and reorient to person, place, and time. Review medications for side effects contributing to fall risk. Reinforce activity limits and safety measures with patient and family. Provide visual clues: red socks.

## 2021-04-28 NOTE — H&P ADULT - NSHPPHYSICALEXAM_GEN_ALL_CORE
General: Well-developed, well nourished, in no acute distress.  Cardiac:  Regular rate and rhythm. No carotid bruits appreciated.  Eyes: Fundoscopic examination was deferred.  Neurologic:  - Mental Status:  Alert, awake, oriented to person, place, and time; Speech is fluent. Language is normal. Follows commands well.  Insight and knowledge appear appropriate.  Cranial Nerves II-XII:    II:  Visual acuity is normal for age ; Visual fields are full to confrontation; Pupils are equal, round, and reactive to light.  III, IV, VI:  Extraocular movements are intact without nystagmus.  V:  Facial sensation is intact in the V1-V3 distribution bilaterally.  VII:  Face is symmetric with normal eye closure and smile  VIII:  Hearing is grossly intact  IX, X, XII:  speech is clear  XI:  Head turning and shoulder shrug are intact.  - Motor:  Strength is 5/5 x 4.   There is no pronator drift.  Uppers     Delt     Bicep     Tricep     HG  R                5/5       5/5         5/5         5/5  L                5/5       5/5         5/5         5/5  Lowers      HF     KF      KE     PF     DF     EHL  R             5/5     5/5     5/5    5/5   5/5    5/5  L              5/5    5/5      5/5    5/5   5/5    5/5  - Reflexes:  2+ and symmetric at the knees.  Plantar responses flexor.  - Sensory:  Symmetric to light touch  - Coordination:  Finger-nose-finger is normal. Rapid alternating hand and foot  movements are intact. Dexterity appears lauren PHYSICAL EXAM:  GENERAL: Intubated, sedated  HEAD:  Atraumatic, normocephalic  NECK: ET tube in place   DEE COMA SCORE: E-1 V-1T M-5 = 7T  MENTAL STATUS: No eye opening; Grimaces to noxious stimuli; Not following commands  CRANIAL NERVES: PERRL. Unable to assess further due to intubation/sedation/mental status   MOTOR: B/l UE localize briskly to noxious stimuli, b/l LE withdraw to noxious stimuli. ISSA purposefully   SENSATION: grossly intact to noxious stimuli in all extremities  CHEST/LUNG: Intubated, on volume A/C settings   ABDOMEN: Soft, nontender, nondistended  SKIN: Warm, dry

## 2021-04-28 NOTE — CONSULT NOTE ADULT - SUBJECTIVE AND OBJECTIVE BOX
HISTORY OF PRESENT ILLNESS:   71yF PMH HTN, CAD s/p MI with h/o PCI 2007, DM2 with associated diabetic neuropathy, ESRD on HD MWF 5/2018, chronic pancreatitis, paroxysmal afib 2018, presented to [facility] after syncopal episode during hemodialysis (~ 30 minutes of dialysis completed), concern for sepsis,    came in from HD ~11 AM was half hour into HD< vomited x 1 and had generalized waekness. Stopped HD, thought it was due to HD. Was awake and alert, did septic labs-- WBC 21k so added lactate and cultures, RN called said she was unresponsive, concern for seizure-- had left gaze preference and perseverated, went for CT head found with [facility]     daughter- [phone]  NOTE IS INCMOMPLETE      PAST MEDICAL & SURGICAL HISTORY:  Hypertension  Myocardial infarction 2007  Diabetes  Type 2 DM  CAD (coronary artery disease) s/p stent x 1 in 2007  ESRD (end stage renal disease) on dialysis MWF since 05/2018  Liver abscess resolved  Acute urinary retention  Cholecystitis with cholangitis  Chronic pancreatitis  PAF (paroxysmal atrial fibrillation) 1 episode in 11/2018 while hospitalized for acute cholecystitis  H/O diabetic neuropathy  H/O: hysterectomy 1990  History of biliary stent insertion 11/2018 &amp; removal 7/2/19  S/P arteriovenous (AV) fistula creation 05/17/2019  Cataract  IOL b/l    FAMILY HISTORY:  No pertinent family history in first degree relatives    SOCIAL HISTORY:  Tobacco Use:  EtOH use:   Substance:    Allergies    ertapenem (Urticaria)  Purell (Rash)    Intolerances        REVIEW OF SYSTEMS  Negative except as noted in HPI  CONSTITUTIONAL: No fever, weight loss, or fatigue  EYES: No eye pain, visual disturbances, or discharge  ENMT:  No difficulty hearing, tinnitus, vertigo; No sinus or throat pain  NECK: No pain or stiffness  BREASTS: No pain, masses, or nipple discharge  RESPIRATORY: No cough, wheezing, chills or hemoptysis; No shortness of breath  CARDIOVASCULAR: No chest pain, palpitations, dizziness, or leg swelling  GASTROINTESTINAL: No abdominal or epigastric pain. No nausea, vomiting, or hematemesis; No diarrhea or constipation. No melena or hematochezia.  GENITOURINARY: No dysuria, frequency, hematuria, or incontinence  NEUROLOGICAL: No headaches, memory loss, loss of strength, numbness, or tremors  SKIN: No itching, burning, rashes, or lesions   LYMPH NODES: No enlarged glands  ENDOCRINE: No heat or cold intolerance; No hair loss  MUSCULOSKELETAL: No joint pain or swelling; No muscle, back, or extremity pain  PSYCHIATRIC: No depression, anxiety, mood swings, or difficulty sleeping  HEME/LYMPH: No easy bruising, or bleeding gums  ALLERY AND IMMUNOLOGIC: No hives or eczema    HOME MEDICATIONS:  Home Medications:  aspirin 81 mg oral tablet, chewable: 1 tab(s) orally once a day (28 Apr 2021 15:00)  atorvastatin 80 mg oral tablet: 1 tab(s) orally once a day (at bedtime) (28 Apr 2021 15:00)  Basaglar KwikPen 100 units/mL subcutaneous solution: 20 unit(s) subcutaneous once a day (at bedtime)    *As per patient and daughter in law, she takes 10units in the morning and then 10 units at night. (28 Apr 2021 15:00)  clopidogrel 75 mg oral tablet: 1 tab(s) orally once a day (28 Apr 2021 14:50)  gabapentin 300 mg oral capsule: 2 cap(s) orally 2 times a day (28 Apr 2021 15:00)  heparin 5000 units/mL injectable solution:  injectable every 12 hours (28 Apr 2021 14:50)  isosorbide mononitrate 60 mg oral tablet, extended release: 1 tab(s) orally once a day (in the morning) (28 Apr 2021 15:00)  metoprolol tartrate 50 mg oral tablet: 1 tab(s) orally 2 times a day (28 Apr 2021 14:50)  Nephro-Jose Daniel oral tablet: 0.8 mg 1 tab(s) orally once a day (28 Apr 2021 14:51)  NovoLOG 100 units/mL injectable solution: 5 unit(s) injectable 3 times a day (28 Apr 2021 15:00)  risperiDONE 0.25 mg oral tablet: 1 tab(s) orally 3 times a day (28 Apr 2021 14:50)  Velphoro 2500 mg (500 mg elemental iron) oral tablet, chewable: 1 tab(s) orally 3 times a day (with meals)    *Patient typically takes BID as per daughter (28 Apr 2021 14:50)      MEDICATIONS:  Antibiotics:    Neuro:    Anticoagulation:    OTHER:    IVF:      Vital Signs Last 24 Hrs  T(C): 36.8 (28 Apr 2021 11:25), Max: 36.8 (28 Apr 2021 11:25)  T(F): 98.3 (28 Apr 2021 11:25), Max: 98.3 (28 Apr 2021 11:25)  HR: 107 (28 Apr 2021 17:09) (104 - 160)  BP: 154/65 (28 Apr 2021 16:44) (76/40 - 170/70)  BP(mean): --  RR: 18 (28 Apr 2021 16:44) (14 - 18)  SpO2: 99% (28 Apr 2021 17:09) (78% - 99%)      PHYSICAL EXAM:  GENERAL: NAD, well-groomed, well-developed  HEAD:  Atraumatic, normocephalic  DRAINS:   WOUND: Dressing clean dry intact; well healed  SHUNT: easily compressible and refills  EYES: Conjunctiva and sclera clear; corneal reflex intact  ENMT: No tonsillar erythema, exudates, or enlargement; moist mucous membranes, good dentition, no lesions  NECK: Supple, no JVD, dormal thyroid  DEE COMA SCORE: E- V- M- =       E: 4= opens eyes spontaneously 3= to voice 2= to noxious 1= no opening       V: 5= oriented 4= confused 3= inappropriate words 2= incomprehensible sounds 1= nonverbal 1T= intubated       M: 6= follows commands 5= localizes 4= withdraws 3= flexor posturing 2= extensor posturing 1= no movement  MENTAL STATUS: AAO x3; Awake/Comatose; Opens eyes spontaneously/to voice/to light touch/to noxious stimuli; Appropriately conversant without aphasia/Nonverbal; following simple commands/mimicking/not following commands  CRANIAL NERVES: Visual acuity normal for age, visual fields full to confrontation, PERRL. EOMI without nystagmus. Facial sensation intact V1-3 distribution b/l. Face symmetric w/ normal eye closure and smile, tongue midline. Hearing grossly intact. Speech clear. Head turning and shoulder shrug intact.   REFLEXES: PERRL. Corneals intact b/l. Gag intact. Cough intact. Oculocephalic reflex intact (Doll's eye). Negative Heck's b/l. Negative clonus b/l  MOTOR: strength 5/5 b/l upper and lower extremities  Uppers     Delt (C5/6)     Bicep (C5/6)     Wrist Extend (C6)     Tricep (C7)     HG (C8/T1)  R                     5/5                 5/5                         5/5                           5/5                   5/5  L                      5/5                 5/5                         5/5                           5/5                   5/5  Lowers      HF(L1/L2)     KE (L3)     DF (L4)     EHL (L5)     PF (S1)      R                     5/5              5/5           5/5           5/5            5/5  L                     5/5               5/5          5/5            5/5            5/5  SENSATION: grossly intact to light touch all extremities  COORDINATION: Gait intact; rapid alternating movements intact b/l upper extremities; no upper extremity dysmetria  MUSCLE STRETCH REFLEXES: DTRs 2+ intact and symmetric  PLANTAR: upgoing/downgoing/mute (Babinski)  CHEST/LUNG: Clear to auscultation bilaterally; no rales, rhonchi, wheezing, or rubs  HEART: +S1/+S2; Regular rate and rhythm; no murmurs, rubs, or gallops  ABDOMEN: Soft, nontender, nondistended; bowel sounds present all four quadrants  EXTREMITIES:  2+ peripheral pulses, no clubbing, cyanosis, or edema  LYMPH: No lymphadenopathy noted  SKIN: Warm, dry; no rashes or lesions    LABS:                        11.5   21.02 )-----------( 444      ( 28 Apr 2021 11:47 )             37.2     04-28    136  |  95<L>  |  50<H>  ----------------------------<  168<H>  4.0   |  26  |  6.60<H>    Ca    9.0      28 Apr 2021 11:47    TPro  8.2  /  Alb  2.8<L>  /  TBili  0.6  /  DBili  x   /  AST  24  /  ALT  7<L>  /  AlkPhos  92  04-28          CULTURES:      RADIOLOGY & ADDITIONAL STUDIES:      CAPRINI SCORE [CLOT]:  Patient has an estimated Caprini score of greater than 5.  However, the patient's unique clinical situation will be addressed in an individual manner to determine appropriate anticoagulation treatment, if any. HISTORY OF PRESENT ILLNESS:   71yF PMH HTN, CAD s/p MI with h/o PCI 2007, DM2 with associated diabetic neuropathy, ESRD on HD MWF 5/2018, chronic pancreatitis, paroxysmal afib 2018, presented to [facility] ~ 11 AM, was at hemodialysis, received 30 minutes of HD, vomited x 1 and complained of generalized weakness, HD stopped and patient sent to ED. Arrived to ED awake and alert, WBC came back elevated at 21k therefore blood cultures, U/A, lactate sent and given Zosyn x 1 and Vancomycin x1. After receiving vancomycin, patient became unresponsive, only perseverated with responses when asked questions, and developed a left gaze preference. This resolved before giving any AED or Ativan. Priority CTH performed found with hemorrhagic conversion and patient was transferred to [facility]. Patient arrived on 50 mcg of propofol and 0.1 mcg of levophed. ROS unable to obtain as patient intubated and sedated.    PAST MEDICAL & SURGICAL HISTORY:  Hypertension  Myocardial infarction 2007  Diabetes  Type 2 DM  CAD (coronary artery disease) s/p stent x 1 in 2007  ESRD (end stage renal disease) on dialysis MWF since 05/2018  Liver abscess resolved  Acute urinary retention  Cholecystitis with cholangitis  Chronic pancreatitis  PAF (paroxysmal atrial fibrillation) 1 episode in 11/2018 while hospitalized for acute cholecystitis  H/O diabetic neuropathy  H/O: hysterectomy 1990  History of biliary stent insertion 11/2018 &amp; removal 7/2/19  S/P arteriovenous (AV) fistula creation 05/17/2019  Cataract  IOL b/l    FAMILY HISTORY:  No pertinent family history in first degree relatives    SOCIAL HISTORY:  Unable to obtain    Allergies  ertapenem (Urticaria)  Purell (Rash)    REVIEW OF SYSTEMS  Negative except as noted in HPI    HOME MEDICATIONS:  Home Medications:  aspirin 81 mg oral tablet, chewable: 1 tab(s) orally once a day (28 Apr 2021 15:00)  atorvastatin 80 mg oral tablet: 1 tab(s) orally once a day (at bedtime) (28 Apr 2021 15:00)  Basaglar KwikPen 100 units/mL subcutaneous solution: 20 unit(s) subcutaneous once a day (at bedtime)    *As per patient and daughter in law, she takes 10units in the morning and then 10 units at night. (28 Apr 2021 15:00)  clopidogrel 75 mg oral tablet: 1 tab(s) orally once a day (28 Apr 2021 14:50)  gabapentin 300 mg oral capsule: 2 cap(s) orally 2 times a day (28 Apr 2021 15:00)  heparin 5000 units/mL injectable solution:  injectable every 12 hours (28 Apr 2021 14:50)  isosorbide mononitrate 60 mg oral tablet, extended release: 1 tab(s) orally once a day (in the morning) (28 Apr 2021 15:00)  metoprolol tartrate 50 mg oral tablet: 1 tab(s) orally 2 times a day (28 Apr 2021 14:50)  Nephro-Jose Daniel oral tablet: 0.8 mg 1 tab(s) orally once a day (28 Apr 2021 14:51)  NovoLOG 100 units/mL injectable solution: 5 unit(s) injectable 3 times a day (28 Apr 2021 15:00)  risperiDONE 0.25 mg oral tablet: 1 tab(s) orally 3 times a day (28 Apr 2021 14:50)  Velphoro 2500 mg (500 mg elemental iron) oral tablet, chewable: 1 tab(s) orally 3 times a day (with meals)    *Patient typically takes BID as per daughter (28 Apr 2021 14:50)    MEDICATIONS:  Antibiotics:    Neuro:    Anticoagulation:    OTHER:    IVF:    Vital Signs Last 24 Hrs  T(C): 36.8 (28 Apr 2021 11:25), Max: 36.8 (28 Apr 2021 11:25)  T(F): 98.3 (28 Apr 2021 11:25), Max: 98.3 (28 Apr 2021 11:25)  HR: 107 (28 Apr 2021 17:09) (104 - 160)  BP: 154/65 (28 Apr 2021 16:44) (76/40 - 170/70)  BP(mean): --  RR: 18 (28 Apr 2021 16:44) (14 - 18)  SpO2: 99% (28 Apr 2021 17:09) (78% - 99%)    PHYSICAL EXAM:  GENERAL: NAD  HEAD: Atraumatic, normocephalic  EYES: Conjunctiva and sclera clear  DEE COMA SCORE: E- 2 V- 1T M- 5=8T  MENTAL STATUS: Intubated, sedated, exam limited. Does not follow commands  CRANIAL NERVES: PERRL. Face grossly symmetric  MOTOR: Localizes b/l upper extremities, withdraws b/l lowers  SENSATION: localizes/withdraws to noxious  CHEST/LUNG: Nonlabored breathing  HEART: Tachycardic    LABS:                        11.5   21.02 )-----------( 444      ( 28 Apr 2021 11:47 )             37.2     04-28    136  |  95<L>  |  50<H>  ----------------------------<  168<H>  4.0   |  26  |  6.60<H>    Ca    9.0      28 Apr 2021 11:47    TPro  8.2  /  Alb  2.8<L>  /  TBili  0.6  /  DBili  x   /  AST  24  /  ALT  7<L>  /  AlkPhos  92  04-28    RADIOLOGY & ADDITIONAL STUDIES:  CT Head No Cont (04.28.21 @ 14:43)  IMPRESSION:  Evolving left posterior cerebral artery territorial infarct with new hemorrhages predominantly in the cortical locations.    CT Chest/Abdomen/Pelvis No Cont (04.28.21 @ 13:48)  IMPRESSION:  No acute intrathoracic pathology.  Cystitis.  Diverticulosis coli, no definite diverticulitis.  Additional findings as discussed

## 2021-04-28 NOTE — ED ADULT TRIAGE NOTE - CHIEF COMPLAINT QUOTE
intubated transfer from [place]. has subdural hemorrhage. had ischemic cva last week. dr. leon called to bedside for eval.

## 2021-04-28 NOTE — CONSULT NOTE ADULT - ASSESSMENT
71yF PMH HTN, CAD s/p MI with h/o PCI 2007, DM2 with associated diabetic neuropathy, ESRD on HD MWF 5/2018, chronic pancreatitis, paroxysmal afib 2018, recent admission for pneumonia with hospital course complicated with afib, L PCA CVA discharged on Plavix (not ACT as patient high risk for bleed), presented to [facility] ~ 11 AM, was at hemodialysis, received 30 minutes of HD, vomited x 1 and complained of generalized weakness, brought to ED, found with WBC 21k s/p Zosyn/Vancomycin, developed seizure like activity, CTH done found with hemorrhagic conversion in L PCA  - GCS E-2 V-1T M-5= 8T    PLAN:  - D/w Dr. Moore    NEURO:  - Q1 neuro checks  - Repeat CTH 6 hours ~ 20:30  - Keppra 250 BID (renally dosed)  - Hold ACT/APT  - Transition from propofol to precedex  - Activity: [] mobilize as tolerated [x] Bedrest for now [] PT [] OT [] PMNR    PULM:  - 15/450/40/5  - CPAP as tolerated once on precedex  - SpO2 goal > 92%    CV:  - SBP goal 100-160  - Labetalol/hydralazine PRN   - Watch HR-- lopressor IVP 5 Q6 PRN for now until PO access obtained  - Lipid profile in AM; LDL 41 from 4/14, no need for statin at this point  - Trend troponins  - TTE / review TTE from [place]    RENAL:  - Fluids: NS @ 50 for now  - Nephrology consult for HD    GI:  - Diet: Start nepro tube feeds once PO access obtained  - GI prophylaxis [] not indicated [x] PPI [] other:   - Bowel regimen [] colace [x] senna once PO access obtained [x] other: miralax once PO access obtained    ENDO:   - Goal euglycemia (-180)  - Q6 finger sticks  - MISS Q6 for now  - A1C/TSH in AM; A1C 8.7% 4/13/21, TSH 1.15 4/14/21    HEME/ONC:  - VTE prophylaxis: [x] SCDs [] chemoprophylaxis [x] hold chemoprophylaxis due to: acute ICH [] high risk of DVT/PE on admission due to:    ID:  - F/u blood cultures from [place]  - Monitor for fever, trend WBC    SOCIAL/FAMILY:  [x] awaiting [] updated at bedside [] family meeting    CODE STATUS:  [x] Full Code [] DNR [] DNI [] Palliative/Comfort Care    DISPOSITION:  [x] ICU [] Stroke Unit [] Floor [] EMU [] RCU [] PCU    [x] Patient is at high risk of neurologic deterioration/death due to: seizure, re-bleed, respiratory failure    Time spent: 40 critical care minutes 71yF PMH HTN, CAD s/p MI with h/o PCI 2007, DM2 with associated diabetic neuropathy, ESRD on HD MWF 5/2018, chronic pancreatitis, paroxysmal afib 2018, recent admission for pneumonia with hospital course complicated with afib, L PCA CVA discharged on Plavix (not ACT as patient high risk for bleed), presented to [facility] ~ 11 AM, was at hemodialysis, received 30 minutes of HD, vomited x 1 and complained of generalized weakness, brought to ED, found with WBC 21k s/p Zosyn/Vancomycin, developed seizure like activity, CTH done found with hemorrhagic conversion in L PCA  - GCS E-2 V-1T M-5= 8T    PLAN:  - D/w Dr. Moore    NEURO:  - Q1 neuro checks  - Repeat CTH 6 hours ~ 20:30  - Keppra 250 BID (renally dosed)  - Hold ACT/APT  - Transition from propofol to precedex  - Activity: [] mobilize as tolerated [x] Bedrest for now [] PT [] OT [] PMNR    PULM:  - 15/450/40/5  - CPAP as tolerated once on precedex  - SpO2 goal > 92%    CV:  - SBP goal 100-160  - Labetalol/hydralazine PRN   - Watch HR-- lopressor IVP 5 Q6 PRN for now until PO access obtained  - Lipid profile in AM; LDL 41 from 4/14, no need for statin at this point  - Trend troponins  - TTE / review TTE from [place]    RENAL:  - Fluids: NS @ 50 for now  - Nephrology consult for HD    GI:  - Diet: Start nepro tube feeds once PO access obtained vs. dysphagia if extubated  - GI prophylaxis [] not indicated [x] PPI [] other:   - Bowel regimen [] colace [x] senna once PO access obtained [x] other: miralax once PO access obtained    ENDO:   - Goal euglycemia (-180)  - Q6 finger sticks  - MISS Q6 for now  - A1C/TSH in AM; A1C 8.7% 4/13/21, TSH 1.15 4/14/21    HEME/ONC:  - VTE prophylaxis: [x] SCDs [] chemoprophylaxis [x] hold chemoprophylaxis due to: acute ICH [] high risk of DVT/PE on admission due to:    ID:  - F/u blood cultures from [place]  - Monitor for fever, trend WBC    SOCIAL/FAMILY:  [x] awaiting [] updated at bedside [] family meeting    CODE STATUS:  [x] Full Code [] DNR [] DNI [] Palliative/Comfort Care    DISPOSITION:  [x] ICU [] Stroke Unit [] Floor [] EMU [] RCU [] PCU    [x] Patient is at high risk of neurologic deterioration/death due to: seizure, re-bleed, respiratory failure    Time spent: 40 critical care minutes

## 2021-04-28 NOTE — ED ADULT NURSE NOTE - CHIEF COMPLAINT QUOTE
intubated transfer from [place]. has subdural hemorrhage. had ischemic cva last week. dr. leon called to bedside for eval.

## 2021-04-28 NOTE — ED ADULT TRIAGE NOTE - CHIEF COMPLAINT QUOTE
S/P Syncopal episode while receiving dialysis. Patient is very lethargic. Patient only received 30 min dialysis.

## 2021-04-28 NOTE — H&P ADULT - NSHPLABSRESULTS_GEN_ALL_CORE
COMPARISON: CT head 4/13/2021    FINDINGS:  There is moderate diffuse parenchymal volume loss. There are areas of low attenuation in the periventricular white matter likely related to mild chronic microvascular ischemic changes.    Evolving left posterior cerebral artery infarct with new hemorrhage is noted predominantly in the cortical location. Persistent mass effect on left lateral ventricle.    There is no midline shift. No hydrocephalus. Partial empty sella    Visualized paranasal sinuses well aerated.    IMPRESSION:  Evolving left posterior cerebral artery territorial infarct with new hemorrhages predominantly in the cortical locations.    MRI BRAIN:  There is restricted diffusion involving the medial left occipital lobe, inferior and medial left temporal lobe, left dorsolateral thalamus, as well as a focus in the right ventral deysi. There is associated mass effect with effacement of the left occipital horn.    Multiple chronic lacunar infarcts in the bilateral cerebellum. FLAIR hyperintense white matter foci are noted throughout the remainder of the cerebral parenchyma, compatible with chronic small vessel disease.  No acute intracranial hemorrhage.    No hydrocephalus. No extra-axial fluid collections.    Bilateral cataract surgery. Orbits are otherwise unremarkable. Mild mucosal thickening involving the right maxillary sinus. The mastoid air cells are clear. The visualized soft tissues and osseous structures appear normal

## 2021-04-28 NOTE — H&P ADULT - HISTORY OF PRESENT ILLNESS
70 y/o female with PMHx CAD s/p stents '07, HTN, MI, PAF, liver abscess, s/p biliary stent with removal (11/2018; 7/2/19), chronic pancreatitis, DM2 on insulin, neuropathy, ESRD (5/2018) on HD (M/W/F) presents to [facility] as a transfer from [place] after she went to HD today with and had  an episode of syncope and change in mental status. CTH obtained revealed evolving left posterior cerebral artery infarct with new hemorrhage. Patient was intubated at [place] ER.  Patient was recently admitted to [place] on 4/12 for hypoxic/hypercapnic resp failure and hyperkalemia secondary to ESRD. While admitted pt was found to be confused, MRI obtained revealed large acute infarct of the left PCA. Pt was originally on Asprin which was changed to Plavix. Patient was discharged to rehab on Plavix.

## 2021-04-28 NOTE — ED PROVIDER NOTE - PHYSICAL EXAMINATION
Pt. intubated and sedated  Pt. responding to tactile stimuli. Pt. moving b/l upper extremities.   Heart-RRR  Abdomen-non- distended.   EXT- mild b/l lower extremity edema.

## 2021-04-28 NOTE — ED PROVIDER NOTE - OBJECTIVE STATEMENT
Pt. accepted from [facility] after pt. was found to have a subdural hemorrhage. Pt. also was intubated at [place] for airway protection as per EMS due to patient worsening mental status. I consulted neurosurgery and neuro ICU PA when patient arrived. Pt. intubated but was able to move her upper extremities. Pt. on propofol and levophed drip.

## 2021-04-28 NOTE — H&P ADULT - NSHPLABSRESULTS_GEN_ALL_CORE
FINDINGS:  There is moderate diffuse parenchymal volume loss. There are areas of low attenuation in the periventricular white matter likely related to mild chronic microvascular ischemic changes.    Evolving left posterior cerebral artery infarct with new hemorrhage is noted predominantly in the cortical location. Persistent mass effect on left lateral ventricle.    There is no midline shift. No hydrocephalus. Partial empty sella    Visualized paranasal sinuses well aerated.    IMPRESSION:  Evolving left posterior cerebral artery territorial infarct with new hemorrhages predominantly in the cortical locations.    MRI BRAIN:  There is restricted diffusion involving the medial left occipital lobe, inferior and medial left temporal lobe, left dorsolateral thalamus, as well as a focus in the right ventral deysi. There is associated mass effect with effacement of the left occipital horn.    Multiple chronic lacunar infarcts in the bilateral cerebellum. FLAIR hyperintense white matter foci are noted throughout the remainder of the cerebral parenchyma, compatible with chronic small vessel disease.  No acute intracranial hemorrhage.    No hydrocephalus. No extra-axial fluid collections.    Bilateral cataract surgery. Orbits are otherwise unremarkable. Mild mucosal thickening involving the right maxillary sinus. The mastoid air cells are clear. The visualized soft tissues and osseous structures appear normal CT Head No Cont (04.28.21 @ 14:43):  IMPRESSION:  Evolving left posterior cerebral artery territorial infarct with new hemorrhages predominantly in the cortical locations.    MR Angio Head No Cont (04.14.21 @ 16:04):  IMPRESSION:  -Occlusion ofthe proximal left PCA.    MR Head No Cont (04.14.21 @ 16:01):  IMPRESSION:  -Large acute infarct involving the left PCA territory.  -Additional small infarct involving the right ventral deysi.  -No acute intracranial hemorrhage.    CT Head No Cont (04.13.21 @ 21:20):  IMPRESSION:  Likely acute to subacute, left MAGEN territory infarct. No obvious acute intracranial hemorrhage. Follow-up MRI would be helpful for further evaluation.

## 2021-04-28 NOTE — ED ADULT NURSE NOTE - OBJECTIVE STATEMENT
transfer from [place] after she went to HD today with and had  an episode of syncope and change in mental status. Patient had HD around 11am today, and 30 mins into HD patient vomited and had generalized weakness. HD was stopped, patient was awake and alert, and sepsis work up was done, WBC count 21K. Patient received 1st dose of vancomycin and RN reported she became unresponsive, L gaze preference, and was perseverating. Patient went for emergent CTH which revealed evolving L posterior cerebral artery infarct with new hemorrhage. Patient was subsequently intubated due to concern for deteriorating. Patient was then transferred to [facility].

## 2021-04-28 NOTE — ED ADULT NURSE NOTE - NSIMPLEMENTINTERV_GEN_ALL_ED
Implemented All Fall with Harm Risk Interventions:  Maurice to call system. Call bell, personal items and telephone within reach. Instruct patient to call for assistance. Room bathroom lighting operational. Non-slip footwear when patient is off stretcher. Physically safe environment: no spills, clutter or unnecessary equipment. Stretcher in lowest position, wheels locked, appropriate side rails in place. Provide visual cue, wrist band, yellow gown, etc. Monitor gait and stability. Monitor for mental status changes and reorient to person, place, and time. Review medications for side effects contributing to fall risk. Reinforce activity limits and safety measures with patient and family. Provide visual clues: red socks.

## 2021-04-28 NOTE — H&P ADULT - HISTORY OF PRESENT ILLNESS
72 y/o female with PMHx CAD s/p stents '07, HTN, MI, PAF, liver abscess, s/p biliary stent with removal (11/2018; 7/2/19), chronic pancreatitis, DM2 on insulin, neuropathy, ESRD (5/2018) on HD (M/W/F) presents to [facility] as a transfer from [place] after she went to HD today with and had  an episode of syncope and change in mental status. CTH obtained revealed evolving left posterior cerebral artery infarct with new hemorrhage. Patient was intubated at [place] ER.  Patient was recently admitted to [place] on 4/12 for hypoxic/hypercapnic resp failure and hyperkalemia secondary to ESRD. While admitted pt was found to be confused, MRI obtained revealed large acute infarct of the left PCA. Pt was originally on Asprin which was changed to Plavix. Patient was discharged to rehab on Plavix. 72 y/o female with PMHx CAD s/p stents '07, HTN, MI, PAF, liver abscess, s/p biliary stent with removal (11/2018; 7/2/19), chronic pancreatitis, DM2 on insulin, diabetic neuropathy, ESRD (5/2018) on HD (M/W/F since 5/18) presents to [facility] as a transfer from [place] after she went to HD today with and had  an episode of syncope and change in mental status. Patient had HD around 11am today, and 30 mins into HD patient vomited and had generalized weakness. HD was stopped, patient was awake and alert, and sepsis work up was done, WBC count 21K. Patient received 1st dose of vancomycin and RN reported she became unresponsive, L gaze preference, and was perseverating. Patient went for emergent CTH which revealed evolving L posterior cerebral artery infarct with new hemorrhage. Patient was subsequently intubated due to concern for deteriorating. Patient was then transferred to [facility].     Of note, patient was recently admitted to [place] on 4/12/21 for hypoxic/hypercapnic resp failure and hyperkalemia secondary to ESRD. While admitted pt was found to be confused, MRI obtained revealed large acute infarct of the left PCA. Pt was originally on Asprin which was changed to Plavix. Patient was discharged to rehab on Plavix.    Neurosurgery/NSICU team evaluated patient at bedside, patient unable to provide further history due to mental status/intubation.

## 2021-04-29 LAB
ALBUMIN SERPL ELPH-MCNC: 2.9 G/DL — LOW (ref 3.3–5.2)
ALP SERPL-CCNC: 72 U/L — SIGNIFICANT CHANGE UP (ref 40–120)
ALT FLD-CCNC: 7 U/L — SIGNIFICANT CHANGE UP
ANION GAP SERPL CALC-SCNC: 22 MMOL/L — HIGH (ref 5–17)
AST SERPL-CCNC: 20 U/L — SIGNIFICANT CHANGE UP
BASE EXCESS BLDA CALC-SCNC: -5.6 MMOL/L — LOW (ref -3–3)
BILIRUB SERPL-MCNC: 0.4 MG/DL — SIGNIFICANT CHANGE UP (ref 0.4–2)
BLOOD GAS COMMENTS ARTERIAL: SIGNIFICANT CHANGE UP
BUN SERPL-MCNC: 61 MG/DL — HIGH (ref 8–20)
CALCIUM SERPL-MCNC: 8.7 MG/DL — SIGNIFICANT CHANGE UP (ref 8.6–10.2)
CHLORIDE SERPL-SCNC: 93 MMOL/L — LOW (ref 98–107)
CO2 SERPL-SCNC: 23 MMOL/L — SIGNIFICANT CHANGE UP (ref 22–29)
COVID-19 SPIKE DOMAIN AB INTERP: POSITIVE
COVID-19 SPIKE DOMAIN ANTIBODY RESULT: >250 U/ML — HIGH
CREAT SERPL-MCNC: 8.23 MG/DL — HIGH (ref 0.5–1.3)
GAS PNL BLDA: SIGNIFICANT CHANGE UP
GAS PNL BLDV: SIGNIFICANT CHANGE UP
GLUCOSE BLDC GLUCOMTR-MCNC: 134 MG/DL — HIGH (ref 70–99)
GLUCOSE BLDC GLUCOMTR-MCNC: 147 MG/DL — HIGH (ref 70–99)
GLUCOSE BLDC GLUCOMTR-MCNC: 172 MG/DL — HIGH (ref 70–99)
GLUCOSE BLDC GLUCOMTR-MCNC: 193 MG/DL — HIGH (ref 70–99)
GLUCOSE BLDC GLUCOMTR-MCNC: 207 MG/DL — HIGH (ref 70–99)
GLUCOSE BLDC GLUCOMTR-MCNC: 301 MG/DL — HIGH (ref 70–99)
GLUCOSE SERPL-MCNC: 162 MG/DL — HIGH (ref 70–99)
HCO3 BLDA-SCNC: 20 MMOL/L — SIGNIFICANT CHANGE UP (ref 20–26)
HOROWITZ INDEX BLDA+IHG-RTO: 0.5 — SIGNIFICANT CHANGE UP
LACTATE SERPL-SCNC: 1.5 MMOL/L — SIGNIFICANT CHANGE UP (ref 0.5–2)
LIDOCAIN IGE QN: 40 U/L — SIGNIFICANT CHANGE UP (ref 22–51)
MAGNESIUM SERPL-MCNC: 2.3 MG/DL — SIGNIFICANT CHANGE UP (ref 1.6–2.6)
PCO2 BLDA: 35 MMHG — SIGNIFICANT CHANGE UP (ref 35–45)
PH BLDA: 7.35 — SIGNIFICANT CHANGE UP (ref 7.35–7.45)
PHOSPHATE SERPL-MCNC: 5.8 MG/DL — HIGH (ref 2.4–4.7)
PO2 BLDA: 189 MMHG — HIGH (ref 83–108)
POTASSIUM SERPL-MCNC: 3.8 MMOL/L — SIGNIFICANT CHANGE UP (ref 3.5–5.3)
POTASSIUM SERPL-SCNC: 3.8 MMOL/L — SIGNIFICANT CHANGE UP (ref 3.5–5.3)
PROT SERPL-MCNC: 6.5 G/DL — LOW (ref 6.6–8.7)
SAO2 % BLDA: 99 % — SIGNIFICANT CHANGE UP (ref 95–99)
SARS-COV-2 IGG+IGM SERPL QL IA: >250 U/ML — HIGH
SARS-COV-2 IGG+IGM SERPL QL IA: POSITIVE
SODIUM SERPL-SCNC: 138 MMOL/L — SIGNIFICANT CHANGE UP (ref 135–145)
TROPONIN T SERPL-MCNC: 0.3 NG/ML — HIGH (ref 0–0.06)

## 2021-04-29 PROCEDURE — 93306 TTE W/DOPPLER COMPLETE: CPT | Mod: 26

## 2021-04-29 PROCEDURE — 95720 EEG PHY/QHP EA INCR W/VEEG: CPT

## 2021-04-29 PROCEDURE — 99291 CRITICAL CARE FIRST HOUR: CPT

## 2021-04-29 PROCEDURE — 99223 1ST HOSP IP/OBS HIGH 75: CPT | Mod: 25

## 2021-04-29 PROCEDURE — 70450 CT HEAD/BRAIN W/O DYE: CPT | Mod: 26

## 2021-04-29 PROCEDURE — 90937 HEMODIALYSIS REPEATED EVAL: CPT

## 2021-04-29 PROCEDURE — 93990 DOPPLER FLOW TESTING: CPT | Mod: 26

## 2021-04-29 PROCEDURE — 71045 X-RAY EXAM CHEST 1 VIEW: CPT | Mod: 26,76

## 2021-04-29 RX ORDER — ROCURONIUM BROMIDE 10 MG/ML
50 VIAL (ML) INTRAVENOUS ONCE
Refills: 0 | Status: COMPLETED | OUTPATIENT
Start: 2021-04-29 | End: 2021-04-29

## 2021-04-29 RX ORDER — ETOMIDATE 2 MG/ML
20 INJECTION INTRAVENOUS ONCE
Refills: 0 | Status: COMPLETED | OUTPATIENT
Start: 2021-04-29 | End: 2021-04-29

## 2021-04-29 RX ORDER — CHLORHEXIDINE GLUCONATE 213 G/1000ML
1 SOLUTION TOPICAL DAILY
Refills: 0 | Status: DISCONTINUED | OUTPATIENT
Start: 2021-04-29 | End: 2021-05-04

## 2021-04-29 RX ORDER — VALPROIC ACID (AS SODIUM SALT) 250 MG/5ML
500 SOLUTION, ORAL ORAL EVERY 12 HOURS
Refills: 0 | Status: DISCONTINUED | OUTPATIENT
Start: 2021-04-29 | End: 2021-04-30

## 2021-04-29 RX ORDER — FENTANYL CITRATE 50 UG/ML
25 INJECTION INTRAVENOUS ONCE
Refills: 0 | Status: DISCONTINUED | OUTPATIENT
Start: 2021-04-29 | End: 2021-04-29

## 2021-04-29 RX ORDER — MIDAZOLAM HYDROCHLORIDE 1 MG/ML
2 INJECTION, SOLUTION INTRAMUSCULAR; INTRAVENOUS ONCE
Refills: 0 | Status: DISCONTINUED | OUTPATIENT
Start: 2021-04-29 | End: 2021-04-29

## 2021-04-29 RX ORDER — ASPIRIN/CALCIUM CARB/MAGNESIUM 324 MG
1 TABLET ORAL
Qty: 0 | Refills: 0 | DISCHARGE

## 2021-04-29 RX ORDER — KETAMINE HYDROCHLORIDE 100 MG/ML
90 INJECTION INTRAMUSCULAR; INTRAVENOUS ONCE
Refills: 0 | Status: DISCONTINUED | OUTPATIENT
Start: 2021-04-29 | End: 2021-04-29

## 2021-04-29 RX ADMIN — ETOMIDATE 20 MILLIGRAM(S): 2 INJECTION INTRAVENOUS at 00:00

## 2021-04-29 RX ADMIN — DEXMEDETOMIDINE HYDROCHLORIDE IN 0.9% SODIUM CHLORIDE 2 MICROGRAM(S)/KG/HR: 4 INJECTION INTRAVENOUS at 17:25

## 2021-04-29 RX ADMIN — DEXMEDETOMIDINE HYDROCHLORIDE IN 0.9% SODIUM CHLORIDE 2 MICROGRAM(S)/KG/HR: 4 INJECTION INTRAVENOUS at 23:44

## 2021-04-29 RX ADMIN — DEXMEDETOMIDINE HYDROCHLORIDE IN 0.9% SODIUM CHLORIDE 2 MICROGRAM(S)/KG/HR: 4 INJECTION INTRAVENOUS at 15:41

## 2021-04-29 RX ADMIN — DEXMEDETOMIDINE HYDROCHLORIDE IN 0.9% SODIUM CHLORIDE 2 MICROGRAM(S)/KG/HR: 4 INJECTION INTRAVENOUS at 19:14

## 2021-04-29 RX ADMIN — FENTANYL CITRATE 25 MICROGRAM(S): 50 INJECTION INTRAVENOUS at 15:08

## 2021-04-29 RX ADMIN — Medication 50 MILLIGRAM(S): at 00:00

## 2021-04-29 RX ADMIN — DEXMEDETOMIDINE HYDROCHLORIDE IN 0.9% SODIUM CHLORIDE 2 MICROGRAM(S)/KG/HR: 4 INJECTION INTRAVENOUS at 11:25

## 2021-04-29 RX ADMIN — PANTOPRAZOLE SODIUM 40 MILLIGRAM(S): 20 TABLET, DELAYED RELEASE ORAL at 11:32

## 2021-04-29 RX ADMIN — CHLORHEXIDINE GLUCONATE 15 MILLILITER(S): 213 SOLUTION TOPICAL at 05:26

## 2021-04-29 RX ADMIN — CHLORHEXIDINE GLUCONATE 1 APPLICATION(S): 213 SOLUTION TOPICAL at 23:43

## 2021-04-29 RX ADMIN — Medication 27.5 MILLIGRAM(S): at 17:26

## 2021-04-29 RX ADMIN — CHLORHEXIDINE GLUCONATE 15 MILLILITER(S): 213 SOLUTION TOPICAL at 17:14

## 2021-04-29 RX ADMIN — Medication 8: at 01:30

## 2021-04-29 RX ADMIN — Medication 8.45 MICROGRAM(S)/KG/MIN: at 06:28

## 2021-04-29 RX ADMIN — Medication 4: at 23:56

## 2021-04-29 RX ADMIN — KETAMINE HYDROCHLORIDE 90 MILLIGRAM(S): 100 INJECTION INTRAMUSCULAR; INTRAVENOUS at 15:08

## 2021-04-29 RX ADMIN — Medication 2: at 11:32

## 2021-04-29 RX ADMIN — MIDAZOLAM HYDROCHLORIDE 2 MILLIGRAM(S): 1 INJECTION, SOLUTION INTRAMUSCULAR; INTRAVENOUS at 00:00

## 2021-04-29 RX ADMIN — Medication 2: at 05:26

## 2021-04-29 RX ADMIN — LEVETIRACETAM 400 MILLIGRAM(S): 250 TABLET, FILM COATED ORAL at 05:26

## 2021-04-29 NOTE — PROGRESS NOTE ADULT - SUBJECTIVE AND OBJECTIVE BOX
HPI:  70 y/o female with PMHx CAD s/p stents '07, HTN, MI, PAF, liver abscess, s/p biliary stent with removal (11/2018; 7/2/19), chronic pancreatitis, DM2 on insulin, diabetic neuropathy, ESRD (5/2018) on HD (M/W/F since 5/18) presents to [facility] as a transfer from [place] after she went to HD today with and had  an episode of syncope and change in mental status. Patient had HD around 11am today, and 30 mins into HD patient vomited and had generalized weakness. HD was stopped, patient was awake and alert, and sepsis work up was done, WBC count 21K. Patient received 1st dose of vancomycin and RN reported she became unresponsive, L gaze preference, and was perseverating. Patient went for emergent CTH which revealed evolving L posterior cerebral artery infarct with new hemorrhage. Patient was subsequently intubated due to concern for deteriorating. Patient was then transferred to [facility].     Of note, patient was recently admitted to [place] on 4/12/21 for hypoxic/hypercapnic resp failure and hyperkalemia secondary to ESRD. While admitted pt was found to be confused, MRI obtained revealed large acute infarct of the left PCA. Pt was originally on Asprin which was changed to Plavix. Patient was discharged to rehab on Plavix.    Neurosurgery/NSICU team evaluated patient at bedside, patient unable to provide further history due to mental status/intubation.  (28 Apr 2021 17:33)      Past Medical History, Family History, Social History:  PAST MEDICAL & SURGICAL HISTORY:  Hypertension    Myocardial infarction  2007    Diabetes  Type 2 DM    CAD (coronary artery disease)  s/p stent x 1 in 2007    ESRD (end stage renal disease) on dialysis  MWF since 05/2018    Liver abscess  resolved    Acute urinary retention    Cholecystitis with cholangitis    Chronic pancreatitis    PAF (paroxysmal atrial fibrillation)  1 episode in 11/2018 while hospitalized for acute cholecystitis    H/O diabetic neuropathy    H/O: hysterectomy  1990    History of biliary stent insertion  11/2018 &amp; removal 7/2/19    S/P arteriovenous (AV) fistula creation  05/17/2019    Cataract  IOL b/l        FAMILY HISTORY:  No pertinent family history in first degree relatives        Social History:  , retired, lives in private residence.   Does not use tobacco, alcohol, or illicit drugs. (28 Apr 2021 17:33)      Interval History:  24 Hour Events: Called to patient's bedside due to loss of pulse during nursing care.  Upon arrival patient was noted to have no pulse, Sinus rhythm on monitor.  PEA.  Chest compressions started, 1mg of epinephrine administered with ROSC.  Persistent hypotension post ROSC, requiring additional hemodynamic monitoring.  Arterial line placed by SICU PA.  Patient had lack of IV access and requiring vasopressor support.   Central line placed.  IV Fluid bolus given and the patient continued on levophed.      After central line placement, patient self extubated, and required re-intubation.      1. Patient's Neurologic Exam blunted due to RSI medications.    2. Patient's Hemodynamic's maintained without drips.    3. Patient's Respiratory status is: adequate    4. Patient is pending: Patient is pending further neurologic recovery, further neurologic, respiratory, and hemodynamic monitoring & management in the ICU.     5. Dispo: ICU for now, downgrade when clinically stable.         Review of Systems:  Review of Systems is Limited due to patient's neurologic status.          Physical Exam:  Constitutional: NAD    Neuro  * Mental Status:  Intubated, eyes opening to noxious stimulation, localize in bilateral uppers and withdrawal bilateral lowers.   * Cranial Nerves: PERRL  * Motor: localize in bilateral uppers and withdrawal bilateral lowers.   * Sensory: Above exam to noxious stimulation  * Reflexes: Not assessed  * Gait: Not assessed          Vitals:  Vital Signs Last 24 Hrs  T(C): 35 (29 Apr 2021 00:38), Max: 36.8 (28 Apr 2021 11:25)  T(F): 95 (29 Apr 2021 00:38), Max: 98.3 (28 Apr 2021 11:25)  HR: 89 (29 Apr 2021 04:00) (62 - 160)  BP: 111/53 (28 Apr 2021 23:00) (76/40 - 221/116)  BP(mean): 70 (28 Apr 2021 23:00) (57 - 139)  RR: 20 (29 Apr 2021 04:00) (12 - 36)  SpO2: 100% (29 Apr 2021 04:00) (78% - 100%)    I&O:  I&O's Summary    28 Apr 2021 07:01  -  29 Apr 2021 04:29  --------------------------------------------------------  IN: 1187.4 mL / OUT: 0 mL / NET: 1187.4 mL        Labs & Radiology:                        12.0   30.65 )-----------( 492      ( 28 Apr 2021 23:01 )             39.6       04-28    136  |  87<L>  |  60.0<H>  ----------------------------<  329<H>  4.2   |  18.0<L>  |  7.36<H>    Ca    9.2      28 Apr 2021 23:01  Phos  8.4     04-28  Mg     2.5     04-28    TPro  7.5  /  Alb  3.3  /  TBili  0.4  /  DBili  x   /  AST  22  /  ALT  8   /  AlkPhos  88  04-28      LIVER FUNCTIONS - ( 28 Apr 2021 23:01 )  Alb: 3.3 g/dL / Pro: 7.5 g/dL / ALK PHOS: 88 U/L / ALT: 8 U/L / AST: 22 U/L / GGT: x             CARDIAC MARKERS ( 28 Apr 2021 23:01 )  x     / 0.30 ng/mL / x     / x     / x      CARDIAC MARKERS ( 28 Apr 2021 20:12 )  x     / 0.20 ng/mL / x     / x     / x      CARDIAC MARKERS ( 28 Apr 2021 11:47 )  .182 ng/mL / x     / x     / x     / 1.7 ng/mL                ABG - ( 29 Apr 2021 01:10 )  pH, Arterial: 7.35  pH, Blood: x     /  pCO2: 35    /  pO2: 189   / HCO3: 20    / Base Excess: -5.6  /  SaO2: 99                  CAPILLARY BLOOD GLUCOSE      POCT Blood Glucose.: 301 mg/dL (29 Apr 2021 01:20)  POCT Blood Glucose.: 171 mg/dL (28 Apr 2021 11:25)      Neurosurgery Imaging:    I attest that all recent neurosurgical imaging was personally reviewed    Medications:  MEDICATIONS  (STANDING):  chlorhexidine 0.12% Liquid 15 milliLiter(s) Oral Mucosa every 12 hours  dexMEDEtomidine Infusion 0.1 MICROgram(s)/kG/Hr (2 mL/Hr) IV Continuous <Continuous>  dextrose 40% Gel 15 Gram(s) Oral once  dextrose 5%. 1000 milliLiter(s) (50 mL/Hr) IV Continuous <Continuous>  dextrose 5%. 1000 milliLiter(s) (100 mL/Hr) IV Continuous <Continuous>  dextrose 50% Injectable 25 Gram(s) IV Push once  dextrose 50% Injectable 12.5 Gram(s) IV Push once  dextrose 50% Injectable 25 Gram(s) IV Push once  glucagon  Injectable 1 milliGRAM(s) IntraMuscular once  insulin lispro (ADMELOG) corrective regimen sliding scale   SubCutaneous every 6 hours  levETIRAcetam  IVPB 250 milliGRAM(s) IV Intermittent every 12 hours  norepinephrine Infusion 0.05 MICROgram(s)/kG/Min (8.45 mL/Hr) IV Continuous <Continuous>  pantoprazole  Injectable 40 milliGRAM(s) IV Push daily    MEDICATIONS  (PRN):  fentaNYL    Injectable 25 MICROGram(s) IV Push every 2 hours PRN Severe Pain (7 - 10)  hydrALAZINE Injectable 10 milliGRAM(s) IV Push every 2 hours PRN SBP >160  labetalol Injectable 10 milliGRAM(s) IV Push every 2 hours PRN Systolic blood pressure > 160 mmHg      Assessment:  HPI:  70 y/o female with PMHx CAD s/p stents '07, HTN, MI, PAF, liver abscess, s/p biliary stent with removal (11/2018; 7/2/19), chronic pancreatitis, DM2 on insulin, diabetic neuropathy, ESRD (5/2018) on HD (M/W/F since 5/18) presents to [facility] as a transfer from [place] after she went to HD today with and had  an episode of syncope and change in mental status. Patient had HD around 11am today, and 30 mins into HD patient vomited and had generalized weakness. HD was stopped, patient was awake and alert, and sepsis work up was done, WBC count 21K. Patient received 1st dose of vancomycin and RN reported she became unresponsive, L gaze preference, and was perseverating. Patient went for emergent CTH which revealed evolving L posterior cerebral artery infarct with new hemorrhage. Patient was subsequently intubated due to concern for deteriorating. Patient was then transferred to [facility].     Of note, patient was recently admitted to [place] on 4/12/21 for hypoxic/hypercapnic resp failure and hyperkalemia secondary to ESRD. While admitted pt was found to be confused, MRI obtained revealed large acute infarct of the left PCA. Pt was originally on Asprin which was changed to Plavix. Patient was discharged to rehab on Plavix.    Neurosurgery/NSICU team evaluated patient at bedside, patient unable to provide further history due to mental status/intubation.  (28 Apr 2021 17:33)        Continue neuro checks  Hold sedation for now  -140, continue levophed  Continue full ventilator support self extubated, re-intubated  NPO           I have spent a total of [__45__] mins of nonconsecutive critical care time managing this patient with[ __Left PCA infarction with hemorrhagic conversion____, __cardiac arrest____, and ___acute hypoxic respiratory failure___].  This included review of relevant history, clinical examination, review of data and images, discussion of treatment with the multidisciplinary team and any consultants involved in this patient’s care as well as family discussion.     I affirm that this patient is critically ill and at high risk for sudden, fatal deterioration due to one or more of the above stated active issues. I managed/supervised life or organ support interventions that required frequent assessment. This time does not include bedside procedures that are documented separately.

## 2021-04-29 NOTE — CONSULT NOTE ADULT - SUBJECTIVE AND OBJECTIVE BOX
HPI:    72 y/o female with PMH:  CAD s/p stents '07, HTN, MI, PAF, liver abscess, s/p biliary stent with removal (11/2018; 7/2/19), chronic pancreatitis, DM2 on insulin, diabetic neuropathy, ESRD (5/2018) on HD (M/W/F since 5/18) presents to [facility] as a transfer from [place] after she went to HD today  and had  an episode of syncope and change in mental status. Patient had HD around 11am today, and 30 mins.,  into HD patient vomited and had generalized weakness.     HD was stopped, patient was awake and alert, and sepsis work up was done, WBC count 21K. Patient received 1st dose of vancomycin and RN reported she became unresponsive, L gaze preference, and was perseverating.   Patient went for emergent CTH which revealed evolving L posterior cerebral artery infarct with new hemorrhage. Patient was subsequently intubated due to concern for deteriorating. Patient was then transferred to [facility].     Of note, patient was recently admitted to [place] on 4/12/21 for hypoxic/hypercapnic respiratory  failure and hyperkalemia secondary to ESRD. While admitted pt was found to be confused, MRI obtained revealed large acute infarct of the left PCA. Pt was originally on Asprin which was changed to Plavix. Patient was discharged to rehab on Plavix.    Neurosurgery/ NS ICU team evaluated patient at bedside, patient unable to provide further history due to mental status/intubation.  (28 Apr 2021 17:33)    OVERNIGHT EVENTS: Self Extubated,    S.P CT ( #2 )    Allergies    ertapenem (Urticaria)  Purell (Rash)      REVIEW OF SYSTEMS: [ ] Unable to Assess due to neurologic exam   [ ] All ROS addressed below are non-contributory, except:  Neuro: [ ] Headache [ ] Back pain [ ] Numbness [ ] Weakness [ ] Ataxia [ ] Dizziness [ ] Aphasia [ ] Dysarthria [ ] Visual disturbance  Resp: [ ] Shortness of breath/dyspnea, [ ] Orthopnea [ ] Cough  CV: [ ] Chest pain [ ] Palpitation [ ] Lightheadedness [ ] Syncope  Renal: [ ] Thirst [ ] Edema  GI: [ ] Nausea [ ] Emesis [ ] Abdominal pain [ ] Constipation [ ] Diarrhea  Hem: [ ] Hematemesis [ ] bright red blood per rectum  ID: [ ] Fever [ ] Chills [ ] Dysuria  ENT: [ ] Rhinorrhea    DEVICES:   [ ] Restraints [ ] ET tube [ ] central line [ ] arterial line [ ] aguilar [ ] NGT/OGT [ ] EVD [ ] LD [ ] TENA/HMV [ ] Trach [ ] PEG [ ] Chest Tube     Vital Signs Last 24 Hrs,    T(C): 37.1 (29 Apr 2021 04:00), Max: 37.1 (29 Apr 2021 04:00)  T(F): 98.7 (29 Apr 2021 04:00), Max: 98.7 (29 Apr 2021 04:00)  HR: 86 (29 Apr 2021 07:00) (62 - 160)  BP: 111/53 (28 Apr 2021 23:00) (76/40 - 221/116)  BP(mean): 70 (28 Apr 2021 23:00) (57 - 139)  RR: 19 (29 Apr 2021 07:00) (12 - 36)  SpO2: 100% (29 Apr 2021 07:00) (78% - 100%)    CAPILLARY BLOOD GLUCOSE    POCT Blood Glucose.: 193 mg/dL (29 Apr 2021 05:22)  POCT Blood Glucose.: 301 mg/dL (29 Apr 2021 01:20)  POCT Blood Glucose.: 171 mg/dL (28 Apr 2021 11:25)    Mode: CPAP with PS  FiO2: 30  PEEP: 5  PS: 5  MAP: 8    LABS:    04-28    136  |  87<L>  |  60.0<H>  ----------------------------<  329<H>  4.2   |  18.0<L>  |  7.36<H>    Ca    9.2      28 Apr 2021 23:01  Phos  8.4     04-28  Mg     2.5     04-28    TPro  7.5  /  Alb  3.3  /  TBili  0.4  /  DBili  x   /  AST  22  /  ALT  8   /  AlkPhos  88  04-28                          12.0   30.65 )-----------( 492      ( 28 Apr 2021 23:01 )             39.6       STROKE CORE MEASURES:      MEDICATION LEVELS:     IMAGING/DATA: [ ] Reviewed    IVF FLUIDS/MEDICATIONS: [ ] Reviewed  MEDICATIONS  (STANDING):  chlorhexidine 0.12% Liquid 15 milliLiter(s) Oral Mucosa every 12 hours  dexMEDEtomidine Infusion 0.1 MICROgram(s)/kG/Hr (2 mL/Hr) IV Continuous <Continuous>  dextrose 40% Gel 15 Gram(s) Oral once  dextrose 5%. 1000 milliLiter(s) (50 mL/Hr) IV Continuous <Continuous>  dextrose 5%. 1000 milliLiter(s) (100 mL/Hr) IV Continuous <Continuous>  dextrose 50% Injectable 25 Gram(s) IV Push once  dextrose 50% Injectable 12.5 Gram(s) IV Push once  dextrose 50% Injectable 25 Gram(s) IV Push once  glucagon  Injectable 1 milliGRAM(s) IntraMuscular once  insulin lispro (ADMELOG) corrective regimen sliding scale   SubCutaneous every 6 hours  levETIRAcetam  IVPB 250 milliGRAM(s) IV Intermittent every 12 hours  norepinephrine Infusion 0.05 MICROgram(s)/kG/Min (8.45 mL/Hr) IV Continuous <Continuous>  pantoprazole  Injectable 40 milliGRAM(s) IV Push daily    MEDICATIONS  (PRN):  fentaNYL    Injectable 25 MICROGram(s) IV Push every 2 hours PRN Severe Pain (7 - 10)  hydrALAZINE Injectable 10 milliGRAM(s) IV Push every 2 hours PRN SBP >160  labetalol Injectable 10 milliGRAM(s) IV Push every 2 hours PRN Systolic blood pressure > 160 mmHg    I&O's Summary    28 Apr 2021 07:01  -  29 Apr 2021 07:00  --------------------------------------------------------  IN: 1277.1 mL / OUT: 300 mL / NET: 977.1 mL    PHYSICAL EXAM: ( Per NS )    Constitutional: No Acute Distress     Neurological: Awake, alert oriented to person, place and time, Following Commands, PERRL, EOMI, No Gaze Preference, Face Symmetrical, Speech Fluent, No dysmetria, No ataxia, No nystagmus     Motor exam:          Upper extremity                         Delt     Bicep     Tricep    HG                                                 R         5/5        5/5        5/5       5/5                                               L          5/5        5/5        5/5       5/5          Lower extremity                        HF         KF        KE       DF         PF                                                  R        5/5        5/5        5/5       5/5         5/5                                               L         5/5        5/5       5/5       5/5          5/5                                                 Sensation: [ ] intact to light touch  [ ] decreased:     Reflexes: Deep Tendon Reflexes Intact     Pulmonary: Clear to Auscultation, No rales, No rhonchi, No wheezes     Cardiovascular: S1, S2, Regular rate and rhythm     Gastrointestinal: Soft, Non-tender, Non-distended     Extremities: No calf tenderness , AVF - L Forearm +    71yF PMH HTN, CAD s/p MI with h/o PCI 2007, DM2 with associated diabetic neuropathy, ESRD on HD MWF 5/2018, chronic pancreatitis, paroxysmal afib 2018, recent admission for pneumonia with hospital course complicated with afib, L PCA CVA discharged on Plavix (not ACT as patient high risk for bleed), presented to [facility] ~ 11 AM, was at hemodialysis, received 30 minutes of HD, vomited x 1 and complained of generalized weakness, brought to ED, found with WBC 21k s/p Zosyn/Vancomycin, developed seizure like activity,     CTH done found with hemorrhagic conversion in L PCA    - GCS E-2 V-1T M-5= 8T    NEURO:  - Q1 neuro checks  - Repeat CTH 6 hours ~ 20:30  - On Keppra 250 BID (renally dosed)  - Transition from propofol to precedex    PULM:  - 15/450/40/5  - CPAP as tolerated once on precedex  - SpO2 goal > 92%    CV:  - SBP goal 100-160 mm.,   - Labetalol/hydralazine PRN   - Watch HR-- lopressor IVP 5 Q6 PRN for now until PO access obtained  - Lipid profile in AM; LDL 41 from 4/14, no need for statin at this point  - Trend troponins  - TTE     RENAL: HD This PM,     GI:  - Diet: Start Nepro tube feeds once PO access obtained vs. dysphagia if extubated  - GI prophylaxis [] not indicated [x] PPI [] other:   - Bowel regimen [] colace [x] senna once PO access obtained [x] other: miralax once PO access obtained    ENDO:   - Goal euglycemia (-180)  - Q6 finger sticks  - MISS Q6 for now  - A1C/TSH in AM; A1C 8.7% 4/13/21, TSH 1.15 4/14/21

## 2021-04-29 NOTE — PROGRESS NOTE ADULT - SUBJECTIVE AND OBJECTIVE BOX
Patient was seen and evaluated on dialysis.   Sedated,   no F/C  + swelling    T(C): 37.1 (04-30-21 @ 11:36), Max: 37.1 (04-30-21 @ 11:36)  HR: 70 (04-30-21 @ 12:45) (61 - 87)  BP: 177/122 (04-30-21 @ 07:45) (84/49 - 177/122)  Wt(kg): -- NA,  PE ;  NAD  lungs - CTA  CV gr 1 murmur,  No gallop or rub  Abd : soft, NT BS +, No masses  Ext- + edema  Neuro : S/P CVA                         9.9    16.22 )-----------( 358      ( 30 Apr 2021 04:55 )             30.8        04-30    140  |  102  |  51.0<H>  ----------------------------<  159<H>  3.7   |  23.0  |  7.17<H>    Ca    8.5<L>      30 Apr 2021 04:55  Phos  5.1     04-30  Mg     2.3     04-30    TPro  6.5<L>  /  Alb  2.9<L>  /  TBili  0.4  /  DBili  x   /  AST  20  /  ALT  7   /  AlkPhos  72  04-29    MEDICATIONS  (STANDING):  chlorhexidine 0.12% Liquid  chlorhexidine 2% Cloths  dexMEDEtomidine Infusion  dextrose 40% Gel  dextrose 5%.  dextrose 5%.  dextrose 50% Injectable  dextrose 50% Injectable  dextrose 50% Injectable  fentaNYL    Injectable PRN  glucagon  Injectable  hydrALAZINE Injectable PRN  insulin lispro (ADMELOG) corrective regimen sliding scale  labetalol Injectable PRN  Nephro-debbie  norepinephrine Infusion  pantoprazole  Injectable  valproate sodium IVPB      Patient stable    Continue HD ( Dialysate Na+ 142 Meq., )

## 2021-04-29 NOTE — PROGRESS NOTE ADULT - ASSESSMENT
ASSESSMENT/PLAN:    NEURO:  Activity: [] OOB as tolerated [] Bedrest [] PT [] OT [] PMNR [] Bed in Chair Position     PULM:    CV:  SBP goal    RENAL:  Fluids:    GI:  Diet:  GI prophylaxis [] not indicated [] PPI [] other:  Bowel regimen [] colace [] senna [] other:    ENDO:   Goal euglycemia (-180)    HEME/ONC:  VTE prophylaxis: [] SCDs [] chemoprophylaxis    ID:    MISC:    SOCIAL/FAMILY:  [] updated at bedside [] family meeting    CODE STATUS:  [] Full Code [] DNR [] DNI [] Palliative/Comfort Care    DISPOSITION:  [] ICU [] Stroke Unit [] Floor [] EMU [] RCU [] PCU    [] Patient is at high risk of neurologic deterioration/death due to:     Time seen:  Time spent: ___ [] critical care minutes   71yF PMH HTN, CAD s/p MI with h/o PCI 2007, DM2 with associated diabetic neuropathy, ESRD on HD MWF 5/2018, chronic pancreatitis, paroxysmal afib 2018, recent admission for pneumonia with hospital course complicated with afib, L PCA CVA discharged on Plavix (not ACT as patient high risk for bleed), presented to [facility] ~ 11 AM, was at hemodialysis, received 30 minutes of HD, vomited x 1 and complained of generalized weakness, brought to ED, found with WBC 21k s/p Zosyn/Vancomycin, developed seizure like activity, CTH done found with hemorrhagic conversion in L PCA  - GCS E-2 V-1T M-5= 8T    PLAN:  - D/w Dr. Moore    NEURO:  - Q1 neuro checks  - Repeat CTH 6 hours ~ 20:30  - Keppra 250 BID (renally dosed)  - Hold ACT/APT  - Transition from propofol to precedex  - Activity: [] mobilize as tolerated [x] Bedrest for now [] PT [] OT [] PMNR    PULM:  - 15/450/40/5  - CPAP as tolerated once on precedex  - SpO2 goal > 92%    CV:  - SBP goal 100-160  - Labetalol/hydralazine PRN   - Watch HR-- lopressor IVP 5 Q6 PRN for now until PO access obtained  - Lipid profile in AM; LDL 41 from 4/14, no need for statin at this point  - Trend troponins  - TTE / review TTE from [facility]    RENAL:  - Fluids: NS @ 50 for now  - Nephrology consult for HD    GI:  - Diet: Start nepro tube feeds once PO access obtained vs. dysphagia if extubated  - GI prophylaxis [] not indicated [x] PPI [] other:   - Bowel regimen [] colace [x] senna once PO access obtained [x] other: miralax once PO access obtained    ENDO:   - Goal euglycemia (-180)  - Q6 finger sticks  - MISS Q6 for now  - A1C/TSH in AM; A1C 8.7% 4/13/21, TSH 1.15 4/14/21    HEME/ONC:  - VTE prophylaxis: [x] SCDs [] chemoprophylaxis [x] hold chemoprophylaxis due to: acute ICH [] high risk of DVT/PE on admission due to:    ID:  - F/u blood cultures from [facility]  - Monitor for fever, trend WBC    SOCIAL/FAMILY:  [x] awaiting [] updated at bedside [] family meeting    CODE STATUS:  [x] Full Code [] DNR [] DNI [] Palliative/Comfort Care    DISPOSITION:  [x] ICU [] Stroke Unit [] Floor [] EMU [] RCU [] PCU    [x] Patient is at high risk of neurologic deterioration/death due to: seizure, re-bleed, respiratory failure    Time spent: 40 critical care minutes   71yF PMH HTN, CAD s/p MI with h/o PCI 2007, DM2 with associated diabetic neuropathy, ESRD on HD MWF 5/2018, chronic pancreatitis, paroxysmal afib 2018, recent admission for pneumonia with hospital course complicated with afib, L PCA CVA discharged on Plavix (not ACT as patient high risk for bleed), presented to  ~ 11 AM, was at hemodialysis, received 30 minutes of HD, vomited x 1 and complained of generalized weakness, brought to ED, found with WBC 21k s/p Zosyn/Vancomycin, developed seizure like activity, CTH done found with hemorrhagic conversion in L PCA  - GCS E-2 V-1T M-5= 8    NEURO:  - Q1 neuro checks  - Repeat CTH stable   - Valproic acid 500mg q 12  Check EEG   - OFF Plavix + ASA for prior stroke   -  precedex for agitation   - Activity: [] mobilize as tolerated [x] Bedrest for now [] PT [] OT [] PMNR    PULM:  - 15/450/40/5  - CPAP as tolerated once on precedex  - SpO2 goal > 92%    CV: CAD/ Hx of Bifascicular Block ; Hx of PAF   - SBP goal 100-150  - Tolerate SBP > 100 or MAP > 65 - wean norepinephrine after dialysis  - Watch HR-- lopressor IVP 5 Q6 PRN for now until PO access obtained  - Lipid LDL 41 from 4/14, no need for statin at this point  - TTE - P ; Will Need LIBIA  - EKG obtained     RENAL: ESRD - dialysis - M/W/F since 2018   dialysis today   Nephro VIT  - Fluids: NS @ 50 for now  -     GI:  - Diet: ho;ld and see if possible extubation   - GI prophylaxis  [x] PPI   - Bowel regimen [] colace [x] senna once PO access obtained [x] other: miralax once PO access obtained  - Last BM 4/29/21   - LFT - nl     ENDO:   - Goal euglycemia (-180)  - Q6 finger sticks   A1C 8.7% 4/13/21, TSH 1.15 4/14/21    HEME/ONC:  - VTE prophylaxis: [x] SCDs [] chemoprophylaxis [x] chemoprophylaxis due to: acute ICH     ID:  - F/u blood cultures from [facility]  - Monitor for fever,  - Leucocytosis/ Prior warming blanket  - Blood cultures- from [facility] / lactate / Urine     SOCIAL/FAMILY:  [x] awaiting [] updated at bedside [] family meeting    CODE STATUS:  [x] Full Code [] DNR [] DNI [] Palliative/Comfort Care    DISPOSITION:  [x] ICU     [x] Patient is at high risk of neurologic deterioration/death due to: seizure, re-bleed, respiratory failure    Time spent: 40 critical care minutes

## 2021-04-29 NOTE — PROGRESS NOTE ADULT - SUBJECTIVE AND OBJECTIVE BOX
SUMMARY:HPI:  72 y/o female with PMHx CAD s/p stents '07, HTN, MI, PAF, liver abscess, s/p biliary stent with removal (11/2018; 7/2/19), chronic pancreatitis, DM2 on insulin, diabetic neuropathy, ESRD (5/2018) on HD (M/W/F since 5/18) presents to [facility] as a transfer from [place] after she went to HD today with and had  an episode of syncope and change in mental status. Patient had HD around 11am today, and 30 mins into HD patient vomited and had generalized weakness. HD was stopped, patient was awake and alert, and sepsis work up was done, WBC count 21K. Patient received 1st dose of vancomycin and RN reported she became unresponsive, L gaze preference, and was perseverating. Patient went for emergent CTH which revealed evolving L posterior cerebral artery infarct with new hemorrhage. Patient was subsequently intubated due to concern for deteriorating. Patient was then transferred to [facility].     Of note, patient was recently admitted to [place] on 4/12/21 for hypoxic/hypercapnic resp failure and hyperkalemia secondary to ESRD. While admitted pt was found to be confused, MRI obtained revealed large acute infarct of the left PCA. Pt was originally on Asprin which was changed to Plavix. Patient was discharged to rehab on Plavix.    Neurosurgery/NSICU team evaluated patient at bedside, patient unable to provide further history due to mental status/intubation.  (28 Apr 2021 17:33)    OVERNIGHT EVENTS:     ADMISSION SCORES:   GCS: HH: MF: NIHSS: RASS: CAM-ICU: ICP:  CLINICAL TRIALS:  Allergies    ertapenem (Urticaria)  Purell (Rash)    Intolerances        REVIEW OF SYSTEMS: [ ] Unable to Assess due to neurologic exam   [ ] All ROS addressed below are non-contributory, except:  Neuro: [ ] Headache [ ] Back pain [ ] Numbness [ ] Weakness [ ] Ataxia [ ] Dizziness [ ] Aphasia [ ] Dysarthria [ ] Visual disturbance  Resp: [ ] Shortness of breath/dyspnea, [ ] Orthopnea [ ] Cough  CV: [ ] Chest pain [ ] Palpitation [ ] Lightheadedness [ ] Syncope  Renal: [ ] Thirst [ ] Edema  GI: [ ] Nausea [ ] Emesis [ ] Abdominal pain [ ] Constipation [ ] Diarrhea  Hem: [ ] Hematemesis [ ] bright red blood per rectum  ID: [ ] Fever [ ] Chills [ ] Dysuria  ENT: [ ] Rhinorrhea    DEVICES:   [ ] Restraints [ ] ET tube [ ] central line [ ] arterial line [ ] aguilar [ ] NGT/OGT [ ] EVD [ ] LD [ ] TENA/HMV [ ] Trach [ ] PEG [ ] Chest Tube     VITALS: [ ] Reviewed  Vital Signs Last 24 Hrs  T(C): 37.1 (29 Apr 2021 04:00), Max: 37.1 (29 Apr 2021 04:00)  T(F): 98.7 (29 Apr 2021 04:00), Max: 98.7 (29 Apr 2021 04:00)  HR: 86 (29 Apr 2021 07:00) (62 - 160)  BP: 111/53 (28 Apr 2021 23:00) (76/40 - 221/116)  BP(mean): 70 (28 Apr 2021 23:00) (57 - 139)  RR: 19 (29 Apr 2021 07:00) (12 - 36)  SpO2: 100% (29 Apr 2021 07:00) (78% - 100%)  CAPILLARY BLOOD GLUCOSE      POCT Blood Glucose.: 193 mg/dL (29 Apr 2021 05:22)  POCT Blood Glucose.: 301 mg/dL (29 Apr 2021 01:20)  POCT Blood Glucose.: 171 mg/dL (28 Apr 2021 11:25)    Mode: CPAP with PS  FiO2: 30  PEEP: 5  PS: 5  MAP: 8      LABS:    04-28    136  |  87<L>  |  60.0<H>  ----------------------------<  329<H>  4.2   |  18.0<L>  |  7.36<H>    Ca    9.2      28 Apr 2021 23:01  Phos  8.4     04-28  Mg     2.5     04-28    TPro  7.5  /  Alb  3.3  /  TBili  0.4  /  DBili  x   /  AST  22  /  ALT  8   /  AlkPhos  88  04-28                          12.0   30.65 )-----------( 492      ( 28 Apr 2021 23:01 )             39.6       STROKE CORE MEASURES:      MEDICATION LEVELS:     IMAGING/DATA: [ ] Reviewed    IVF FLUIDS/MEDICATIONS: [ ] Reviewed  MEDICATIONS  (STANDING):  chlorhexidine 0.12% Liquid 15 milliLiter(s) Oral Mucosa every 12 hours  dexMEDEtomidine Infusion 0.1 MICROgram(s)/kG/Hr (2 mL/Hr) IV Continuous <Continuous>  dextrose 40% Gel 15 Gram(s) Oral once  dextrose 5%. 1000 milliLiter(s) (50 mL/Hr) IV Continuous <Continuous>  dextrose 5%. 1000 milliLiter(s) (100 mL/Hr) IV Continuous <Continuous>  dextrose 50% Injectable 25 Gram(s) IV Push once  dextrose 50% Injectable 12.5 Gram(s) IV Push once  dextrose 50% Injectable 25 Gram(s) IV Push once  glucagon  Injectable 1 milliGRAM(s) IntraMuscular once  insulin lispro (ADMELOG) corrective regimen sliding scale   SubCutaneous every 6 hours  levETIRAcetam  IVPB 250 milliGRAM(s) IV Intermittent every 12 hours  norepinephrine Infusion 0.05 MICROgram(s)/kG/Min (8.45 mL/Hr) IV Continuous <Continuous>  pantoprazole  Injectable 40 milliGRAM(s) IV Push daily    MEDICATIONS  (PRN):  fentaNYL    Injectable 25 MICROGram(s) IV Push every 2 hours PRN Severe Pain (7 - 10)  hydrALAZINE Injectable 10 milliGRAM(s) IV Push every 2 hours PRN SBP >160  labetalol Injectable 10 milliGRAM(s) IV Push every 2 hours PRN Systolic blood pressure > 160 mmHg    I&O's Summary    28 Apr 2021 07:01  -  29 Apr 2021 07:00  --------------------------------------------------------  IN: 1277.1 mL / OUT: 300 mL / NET: 977.1 mL        EXAMINATION:  PHYSICAL EXAM:    Constitutional: No Acute Distress     Neurological: Awake, alert oriented to person, place and time, Following Commands, PERRL, EOMI, No Gaze Preference, Face Symmetrical, Speech Fluent, No dysmetria, No ataxia, No nystagmus     Motor exam:          Upper extremity                         Delt     Bicep     Tricep    HG                                                 R         5/5        5/5        5/5       5/5                                               L          5/5        5/5        5/5       5/5          Lower extremity                        HF         KF        KE       DF         PF                                                  R        5/5        5/5        5/5       5/5         5/5                                               L         5/5 5/5 5/5 5/5 5/5                                                 Sensation: [ ] intact to light touch  [ ] decreased:     Reflexes: Deep Tendon Reflexes Intact     Pulmonary: Clear to Auscultation, No rales, No rhonchi, No wheezes     Cardiovascular: S1, S2, Regular rate and rhythm     Gastrointestinal: Soft, Non-tender, Non-distended     Extremities: No calf tenderness     Incision:    SUMMARY:HPI:  70 y/o female with PMHx CAD s/p stents '07, HTN, MI, PAF, liver abscess, s/p biliary stent with removal (11/2018; 7/2/19), chronic pancreatitis, DM2 on insulin, diabetic neuropathy, ESRD (5/2018) on HD (M/W/F since 5/18) presents to [facility] as a transfer from [place] after she went to HD today with and had  an episode of syncope and change in mental status. Patient had HD around 11am4/28/21  and 30 mins into HD patient vomited and had generalized weakness. HD was stopped, patient was awake and alert, and sepsis work up was done, WBC count 21K. Patient received 1st dose of vancomycin and RN reported she became unresponsive, L gaze preference, and was perseverating. Patient went for emergent CTH which revealed evolving L posterior cerebral artery infarct with new hemorrhage. Patient was subsequently intubated due to concern for deteriorating. Patient was then transferred to [facility].     Of note, patient was recently admitted to [place] on 4/12/21 for hypoxic/hypercapnic resp failure and hyperkalemia secondary to ESRD. While admitted pt was found to be confused, MRI obtained revealed large acute infarct of the left PCA. Pt was originally on Asprin which was changed to Plavix. Patient was discharged to rehab on Plavix.    Neurosurgery/NSICU team evaluated patient at bedside, patient unable to provide further history due to mental status/intubation.  (28 Apr 2021 17:33)    Overnight events   At 1030 pm 4/28/21- PEA arrest and CPR 30secs and subsequently extubated and reintubated     :     Allergies    ertapenem (Urticaria)  Purell (Rash)    Intolerances        REVIEW OF SYSTEMS: [X ] Unable to Assess due to neurologic exam    Neuro: [ ] Headache [ ] Back pain [ ] Numbness [ ] Weakness [ ] Ataxia [ ] Dizziness [ ] Aphasia [ ] Dysarthria [ ] Visual disturbance  Resp: [ ] Shortness of breath/dyspnea, [ ] Orthopnea [ ] Cough  CV: [ ] Chest pain [ ] Palpitation [ ] Lightheadedness [ ] Syncope  Renal: [ ] Thirst [ ] Edema  GI: [ ] Nausea [ ] Emesis [ ] Abdominal pain [ ] Constipation [ ] Diarrhea  Hem: [ ] Hematemesis [ ] bright red blood per rectum  ID: [ ] Fever [ ] Chills [ ] Dysuria  ENT: [ ] Rhinorrhea    DEVICES:   [X ] Restraints [x ] ET tube [ ] central line [X ] R Fem  arterial line [x ] NGT/OGT     VITALS: [ ] Reviewed  Vital Signs Last 24 Hrs  T(C): 37.1 (29 Apr 2021 04:00), Max: 37.1 (29 Apr 2021 04:00)  T(F): 98.7 (29 Apr 2021 04:00), Max: 98.7 (29 Apr 2021 04:00)  HR: 86 (29 Apr 2021 07:00) (62 - 160)  BP: 111/53 (28 Apr 2021 23:00) (76/40 - 221/116)  BP(mean): 70 (28 Apr 2021 23:00) (57 - 139)  RR: 19 (29 Apr 2021 07:00) (12 - 36)  SpO2: 100% (29 Apr 2021 07:00) (78% - 100%)  CAPILLARY BLOOD GLUCOSE    CAPILLARY BLOOD GLUCOSE      POCT Blood Glucose.: 172 mg/dL (29 Apr 2021 11:30)  POCT Blood Glucose.: 134 mg/dL (29 Apr 2021 09:41)  POCT Blood Glucose.: 193 mg/dL (29 Apr 2021 05:22)  POCT Blood Glucose.: 301 mg/dL (29 Apr 2021 01:20)      Mode:AC  FiO2: 30  PEEP: 5  TV- 450  PS: 5  MAP: 8      LABS:    04-28    136  |  87<L>  |  60.0<H>  ----------------------------<  329<H>  4.2   |  18.0<L>  |  7.36<H>    Ca    9.2      28 Apr 2021 23:01  Phos  8.4     04-28  Mg     2.5     04-28    TPro  7.5  /  Alb  3.3  /  TBili  0.4  /  DBili  x   /  AST  22  /  ALT  8   /  AlkPhos  88  04-28                          12.0   30.65 )-----------( 492      ( 28 Apr 2021 23:01 )             39.6       STROKE CORE MEASURES:   HGBA1C - 8.7  Lipid - LDL- 41 / Trig- 83  TSH and Free T4- 1.15        IMAGING/DATA: [X ] Reviewed    < from: CT Head No Cont (04.29.21 @ 09:20) >  INTERPRETATION:  CT brain without contrast    History intracranial hemorrhage    Comparison yesterday    Left posterior temporal and occipital gyriform hyperattenuation is stable in degree since the prior exam with maturing encephalomalacia. There is no new hemorrhage or cortical edema, mass effect, hydrocephalus or midline shift..    IMPRESSION:  Maturing infarct and developing laminar necrosis as above    < end of copied text >      IVF FLUIDS/MEDICATIONS: [ ] Reviewed  MEDICATIONS  (STANDING):  chlorhexidine 0.12% Liquid 15 milliLiter(s) Oral Mucosa every 12 hours  dexMEDEtomidine Infusion 0.1 MICROgram(s)/kG/Hr (2 mL/Hr) IV Continuous <Continuous>  dextrose 40% Gel 15 Gram(s) Oral once  dextrose 5%. 1000 milliLiter(s) (50 mL/Hr) IV Continuous <Continuous>  dextrose 5%. 1000 milliLiter(s) (100 mL/Hr) IV Continuous <Continuous>  dextrose 50% Injectable 25 Gram(s) IV Push once  dextrose 50% Injectable 12.5 Gram(s) IV Push once  dextrose 50% Injectable 25 Gram(s) IV Push once  glucagon  Injectable 1 milliGRAM(s) IntraMuscular once  insulin lispro (ADMELOG) corrective regimen sliding scale   SubCutaneous every 6 hours  levETIRAcetam  IVPB 250 milliGRAM(s) IV Intermittent every 12 hours  norepinephrine Infusion 0.05 MICROgram(s)/kG/Min (8.45 mL/Hr) IV Continuous <Continuous>  pantoprazole  Injectable 40 milliGRAM(s) IV Push daily    MEDICATIONS  (PRN):  fentaNYL    Injectable 25 MICROGram(s) IV Push every 2 hours PRN Severe Pain (7 - 10)  hydrALAZINE Injectable 10 milliGRAM(s) IV Push every 2 hours PRN SBP >160  labetalol Injectable 10 milliGRAM(s) IV Push every 2 hours PRN Systolic blood pressure > 160 mmHg    EKG - Bifasicular Block with ST depression   Troponins - neg  ECHO-EF - 55%; Mild MR/TR ; Grade 1 Diastolic Dysfunctioon     I&O's Summary    28 Apr 2021 07:01  -  29 Apr 2021 07:00  --------------------------------------------------------  IN: 1277.1 mL / OUT: 300 mL / NET: 977.1 mL    UA-  Blood Cultures        EXAMINATION:  PHYSICAL EXAM:    Constitutional: No Acute Distress     Neurological:  On precedex at 0.5 mcg/kg/min ; does open eyes to command ; Pupils 2mm /3mm m R/L reactive ,     Motor exam:          Upper extremity                         Delt     Bicep     Tricep    HG                                                 R        4+/5                                               L         4+/5          Lower extremity                        HF         KF        KE       DF         PF                                                  R        3/5 throughout                                               L        3/5 throughout                                                  Sensation:   [ X] decreased: - Hxof neuropathy       Pulmonary: Clear to Auscultation, No rales, No rhonchi, No wheezes     Cardiovascular: S1, S2, Regular rate and rhythm     Gastrointestinal: Soft, Non-tender, Non-distended     Extremities: No calf tenderness

## 2021-04-29 NOTE — CONSULT NOTE ADULT - ASSESSMENT
On Precedex & Levophed,    BP very sensitive to Pressors,    TTE  - Being done by Bedside,    L - Forearm AVF +,    CT Brain > Evolving Changes,    Reviewed Thge clinical Course , CT Findings reymundo Damon, NS ( Neuro ICU )       HD - This PM,   No EPO, No Heparin, Minimal UF,

## 2021-04-29 NOTE — PROGRESS NOTE ADULT - SUBJECTIVE AND OBJECTIVE BOX
ICU Vital Signs Last 24 Hrs,    T(C): 37.5 (2021 08:00), Max: 37.5 (2021 08:00)  T(F): 99.5 (2021 08:00), Max: 99.5 (2021 08:00)  HR: 80 (2021 12:47) (62 - 160)  BP: 110/30 (2021 08:45) (76/40 - 221/116)  BP(mean): 54 (2021 08:45) (54 - 139)  ABP: 133/55 (2021 12:30) (80/42 - 222/75)  ABP(mean): 78 (2021 12:30) (51 - 123)  RR: 15 (2021 12:30) (12 - 36)  SpO2: 100% (2021 12:47) (78% - 100%)    136    |  87<L>  |  60.0<H>  ----------------------------<  329<H>  Ca:9.2   (2021 23:01)  4.2     |  18.0<L>  |  7.36<H>    eGFR if Non : 5 *L*  eGFR if : 6 *L*    TPro  7.5    /  Alb  3.3    /  TBili  0.4    /  DBili  x      /  AST  22     /  ALT  8      /  AlkPhos  88     2021 23:01                                12.0   30.65<H> )-----------( 492<H>    ( 2021 23:01 )                   39.6     Phos:8.4 mg/dL<H> M.5 mg/dL ( @ 23:01)    AVF - L Forearm : No B& T,    D/W N.S,    To Obtain a Non Tunneled R IJ Dialysis Catheter,

## 2021-04-29 NOTE — PROGRESS NOTE ADULT - ASSESSMENT
2times Hemodialysis started due to  femoral catheter was  not working.    reversed line with poor result.    artery pressure drop and arterial chamber getting  empty.     rinsed back.    Notified Dr Guzman and neuro PA and primary RN.

## 2021-04-30 ENCOUNTER — TRANSCRIPTION ENCOUNTER (OUTPATIENT)
Age: 71
End: 2021-04-30

## 2021-04-30 LAB
ANION GAP SERPL CALC-SCNC: 16 MMOL/L — SIGNIFICANT CHANGE UP (ref 5–17)
BUN SERPL-MCNC: 51 MG/DL — HIGH (ref 8–20)
CALCIUM SERPL-MCNC: 8.5 MG/DL — LOW (ref 8.6–10.2)
CHLORIDE SERPL-SCNC: 102 MMOL/L — SIGNIFICANT CHANGE UP (ref 98–107)
CHOLEST SERPL-MCNC: 106 MG/DL — SIGNIFICANT CHANGE UP
CO2 SERPL-SCNC: 23 MMOL/L — SIGNIFICANT CHANGE UP (ref 22–29)
CREAT SERPL-MCNC: 7.17 MG/DL — HIGH (ref 0.5–1.3)
GLUCOSE BLDC GLUCOMTR-MCNC: 118 MG/DL — HIGH (ref 70–99)
GLUCOSE BLDC GLUCOMTR-MCNC: 150 MG/DL — HIGH (ref 70–99)
GLUCOSE BLDC GLUCOMTR-MCNC: 161 MG/DL — HIGH (ref 70–99)
GLUCOSE BLDC GLUCOMTR-MCNC: 208 MG/DL — HIGH (ref 70–99)
GLUCOSE SERPL-MCNC: 159 MG/DL — HIGH (ref 70–99)
HCT VFR BLD CALC: 30.8 % — LOW (ref 34.5–45)
HDLC SERPL-MCNC: 40 MG/DL — LOW
HGB BLD-MCNC: 9.9 G/DL — LOW (ref 11.5–15.5)
LIPID PNL WITH DIRECT LDL SERPL: 39 MG/DL — SIGNIFICANT CHANGE UP
MAGNESIUM SERPL-MCNC: 2.3 MG/DL — SIGNIFICANT CHANGE UP (ref 1.6–2.6)
MCHC RBC-ENTMCNC: 28.9 PG — SIGNIFICANT CHANGE UP (ref 27–34)
MCHC RBC-ENTMCNC: 32.1 GM/DL — SIGNIFICANT CHANGE UP (ref 32–36)
MCV RBC AUTO: 90.1 FL — SIGNIFICANT CHANGE UP (ref 80–100)
NON HDL CHOLESTEROL: 66 MG/DL — SIGNIFICANT CHANGE UP
PHOSPHATE SERPL-MCNC: 5.1 MG/DL — HIGH (ref 2.4–4.7)
PLATELET # BLD AUTO: 358 K/UL — SIGNIFICANT CHANGE UP (ref 150–400)
POTASSIUM SERPL-MCNC: 3.7 MMOL/L — SIGNIFICANT CHANGE UP (ref 3.5–5.3)
POTASSIUM SERPL-SCNC: 3.7 MMOL/L — SIGNIFICANT CHANGE UP (ref 3.5–5.3)
RBC # BLD: 3.42 M/UL — LOW (ref 3.8–5.2)
RBC # FLD: 14 % — SIGNIFICANT CHANGE UP (ref 10.3–14.5)
SODIUM SERPL-SCNC: 140 MMOL/L — SIGNIFICANT CHANGE UP (ref 135–145)
TRIGL SERPL-MCNC: 133 MG/DL — SIGNIFICANT CHANGE UP
WBC # BLD: 16.22 K/UL — HIGH (ref 3.8–10.5)
WBC # FLD AUTO: 16.22 K/UL — HIGH (ref 3.8–10.5)

## 2021-04-30 PROCEDURE — 95720 EEG PHY/QHP EA INCR W/VEEG: CPT

## 2021-04-30 PROCEDURE — 90937 HEMODIALYSIS REPEATED EVAL: CPT

## 2021-04-30 PROCEDURE — 74018 RADEX ABDOMEN 1 VIEW: CPT | Mod: 26

## 2021-04-30 PROCEDURE — 99291 CRITICAL CARE FIRST HOUR: CPT

## 2021-04-30 PROCEDURE — 99222 1ST HOSP IP/OBS MODERATE 55: CPT

## 2021-04-30 RX ORDER — MIDODRINE HYDROCHLORIDE 2.5 MG/1
5 TABLET ORAL
Refills: 0 | Status: DISCONTINUED | OUTPATIENT
Start: 2021-04-30 | End: 2021-05-03

## 2021-04-30 RX ORDER — PANTOPRAZOLE SODIUM 20 MG/1
40 TABLET, DELAYED RELEASE ORAL DAILY
Refills: 0 | Status: DISCONTINUED | OUTPATIENT
Start: 2021-05-01 | End: 2021-05-01

## 2021-04-30 RX ORDER — VALPROIC ACID (AS SODIUM SALT) 250 MG/5ML
500 SOLUTION, ORAL ORAL EVERY 12 HOURS
Refills: 0 | Status: DISCONTINUED | OUTPATIENT
Start: 2021-04-30 | End: 2021-05-01

## 2021-04-30 RX ORDER — POTASSIUM CHLORIDE 20 MEQ
40 PACKET (EA) ORAL ONCE
Refills: 0 | Status: COMPLETED | OUTPATIENT
Start: 2021-04-30 | End: 2021-04-30

## 2021-04-30 RX ORDER — KETAMINE HYDROCHLORIDE 100 MG/ML
90 INJECTION INTRAMUSCULAR; INTRAVENOUS ONCE
Refills: 0 | Status: DISCONTINUED | OUTPATIENT
Start: 2021-04-30 | End: 2021-04-30

## 2021-04-30 RX ORDER — HALOPERIDOL DECANOATE 100 MG/ML
0.5 INJECTION INTRAMUSCULAR ONCE
Refills: 0 | Status: COMPLETED | OUTPATIENT
Start: 2021-04-30 | End: 2021-04-30

## 2021-04-30 RX ADMIN — DEXMEDETOMIDINE HYDROCHLORIDE IN 0.9% SODIUM CHLORIDE 2 MICROGRAM(S)/KG/HR: 4 INJECTION INTRAVENOUS at 22:25

## 2021-04-30 RX ADMIN — MIDODRINE HYDROCHLORIDE 5 MILLIGRAM(S): 2.5 TABLET ORAL at 16:11

## 2021-04-30 RX ADMIN — CHLORHEXIDINE GLUCONATE 15 MILLILITER(S): 213 SOLUTION TOPICAL at 06:44

## 2021-04-30 RX ADMIN — CHLORHEXIDINE GLUCONATE 1 APPLICATION(S): 213 SOLUTION TOPICAL at 11:10

## 2021-04-30 RX ADMIN — CHLORHEXIDINE GLUCONATE 15 MILLILITER(S): 213 SOLUTION TOPICAL at 17:10

## 2021-04-30 RX ADMIN — KETAMINE HYDROCHLORIDE 90 MILLIGRAM(S): 100 INJECTION INTRAMUSCULAR; INTRAVENOUS at 11:35

## 2021-04-30 RX ADMIN — Medication 4: at 23:50

## 2021-04-30 RX ADMIN — Medication 40 MILLIEQUIVALENT(S): at 16:15

## 2021-04-30 RX ADMIN — Medication 27.5 MILLIGRAM(S): at 06:44

## 2021-04-30 RX ADMIN — PANTOPRAZOLE SODIUM 40 MILLIGRAM(S): 20 TABLET, DELAYED RELEASE ORAL at 11:05

## 2021-04-30 RX ADMIN — HALOPERIDOL DECANOATE 0.5 MILLIGRAM(S): 100 INJECTION INTRAMUSCULAR at 16:10

## 2021-04-30 RX ADMIN — DEXMEDETOMIDINE HYDROCHLORIDE IN 0.9% SODIUM CHLORIDE 2 MICROGRAM(S)/KG/HR: 4 INJECTION INTRAVENOUS at 03:30

## 2021-04-30 RX ADMIN — DEXMEDETOMIDINE HYDROCHLORIDE IN 0.9% SODIUM CHLORIDE 2 MICROGRAM(S)/KG/HR: 4 INJECTION INTRAVENOUS at 12:16

## 2021-04-30 RX ADMIN — DEXMEDETOMIDINE HYDROCHLORIDE IN 0.9% SODIUM CHLORIDE 2 MICROGRAM(S)/KG/HR: 4 INJECTION INTRAVENOUS at 17:09

## 2021-04-30 RX ADMIN — Medication 1 TABLET(S): at 11:05

## 2021-04-30 RX ADMIN — FENTANYL CITRATE 25 MICROGRAM(S): 50 INJECTION INTRAVENOUS at 23:52

## 2021-04-30 RX ADMIN — DEXMEDETOMIDINE HYDROCHLORIDE IN 0.9% SODIUM CHLORIDE 2 MICROGRAM(S)/KG/HR: 4 INJECTION INTRAVENOUS at 14:36

## 2021-04-30 RX ADMIN — DEXMEDETOMIDINE HYDROCHLORIDE IN 0.9% SODIUM CHLORIDE 2 MICROGRAM(S)/KG/HR: 4 INJECTION INTRAVENOUS at 04:00

## 2021-04-30 RX ADMIN — Medication 500 MILLIGRAM(S): at 17:10

## 2021-04-30 RX ADMIN — Medication 2 MILLIGRAM(S): at 12:16

## 2021-04-30 RX ADMIN — DEXMEDETOMIDINE HYDROCHLORIDE IN 0.9% SODIUM CHLORIDE 2 MICROGRAM(S)/KG/HR: 4 INJECTION INTRAVENOUS at 00:43

## 2021-04-30 RX ADMIN — Medication 2: at 11:06

## 2021-04-30 NOTE — DIETITIAN INITIAL EVALUATION ADULT. - PERTINENT MEDS FT
MEDICATIONS  (STANDING):  chlorhexidine 0.12% Liquid 15 milliLiter(s) Oral Mucosa every 12 hours  chlorhexidine 2% Cloths 1 Application(s) Topical daily  dexMEDEtomidine Infusion 0.1 MICROgram(s)/kG/Hr (2 mL/Hr) IV Continuous <Continuous>  dextrose 40% Gel 15 Gram(s) Oral once  dextrose 5%. 1000 milliLiter(s) (50 mL/Hr) IV Continuous <Continuous>  dextrose 5%. 1000 milliLiter(s) (100 mL/Hr) IV Continuous <Continuous>  dextrose 50% Injectable 25 Gram(s) IV Push once  dextrose 50% Injectable 12.5 Gram(s) IV Push once  dextrose 50% Injectable 25 Gram(s) IV Push once  glucagon  Injectable 1 milliGRAM(s) IntraMuscular once  insulin lispro (ADMELOG) corrective regimen sliding scale   SubCutaneous every 6 hours  Nephro-debbie 1 Tablet(s) Oral daily  norepinephrine Infusion 0.05 MICROgram(s)/kG/Min (8.45 mL/Hr) IV Continuous <Continuous>  pantoprazole  Injectable 40 milliGRAM(s) IV Push daily  valproate sodium IVPB 500 milliGRAM(s) IV Intermittent every 12 hours    MEDICATIONS  (PRN):  fentaNYL    Injectable 25 MICROGram(s) IV Push every 2 hours PRN Severe Pain (7 - 10)  hydrALAZINE Injectable 10 milliGRAM(s) IV Push every 2 hours PRN SBP >160  labetalol Injectable 10 milliGRAM(s) IV Push every 2 hours PRN Systolic blood pressure > 160 mmHg  LORazepam   Injectable 2 milliGRAM(s) IV Push once PRN Agitation

## 2021-04-30 NOTE — PROGRESS NOTE ADULT - ASSESSMENT
1) ESRD on HD  2) MBD of renal dx  3) Anemia of renal dx  4) Vol HTN    HD today via 19cm catheter  Discussed in detail with neurosurgery

## 2021-04-30 NOTE — DIETITIAN INITIAL EVALUATION ADULT. - PERTINENT LABORATORY DATA
04-30 Na140 mmol/L Glu 159 mg/dL<H> K+ 3.7 mmol/L Cr  7.17 mg/dL<H> BUN 51.0 mg/dL<H> Phos 5.1 mg/dL<H> Alb n/a   PAB n/a

## 2021-04-30 NOTE — DISCHARGE NOTE PROVIDER - HOSPITAL COURSE
Patient is a 72 y/o female with PMHx CAD s/p stents '07, HTN, MI, PAF, liver abscess, s/p biliary stent with removal (11/2018; 7/2/19), chronic pancreatitis, DM2 on insulin, diabetic neuropathy, ESRD (5/2018) on HD (M/W/F since 5/18) presents to [facility] as a transfer from [place] after she went to HD with and had  an episode of syncope and change in mental status. HD RN reported she became unresponsive, L gaze preference, and was perseverating. Patient went for emergent CTH which revealed evolving L posterior cerebral artery infarct with new hemorrhage. Patient was subsequently intubated due to concern for deteriorating. Patient was then transferred to [facility].     Of note, patient was recently admitted to [place] on 4/12/21 for hypoxic/hypercapnic resp failure and hyperkalemia secondary to ESRD. While admitted pt was found to be confused, MRI obtained revealed large acute infarct of the left PCA. Pt was originally on Asprin which was changed to Plavix. Patient was discharged to rehab on Plavix.    Patient brought to NSICU and seen by nephro and continued on HD. Patient was extubated on 5/1. Patient seen by vascular for low flow out of the fistula and had a left femoral placed for HD. Patient downgraded to medical floor. Patients course complicated by multiple periods of pulling IV lines and the original left IJ.   Clinical course continued to be complicated with no improvment. Family meeting was called and family decided on comfort care and hospice        patient pulled her second left IJ on 5.14  patient also pulled all her peripheral lines   5.17 midline placed   5/18- overnight, she pulled midline, placed another one today     5/19 - permacath cancelled  5/19- third [facility], HD done, ethics consulted  5/20 - unable to complete HD due to malfunctioning left IJ  5/21- another family meeting, dnr dni signed, no ng tube, conservative measures

## 2021-04-30 NOTE — DISCHARGE NOTE PROVIDER - DETAILS OF MALNUTRITION DIAGNOSIS/DIAGNOSES
This patient has been assessed with a concern for Malnutrition and was treated during this hospitalization for the following Nutrition diagnosis/diagnoses:     -  05/07/2021: Moderate protein-calorie malnutrition

## 2021-04-30 NOTE — CONSULT NOTE ADULT - SUBJECTIVE AND OBJECTIVE BOX
VASCULAR SURGERY CONSULT     HPI: 72 y/o female with PMH:  CAD s/p stents '07, HTN, MI, PAF, liver abscess, s/p biliary stent with removal (11/2018; 7/2/19), chronic pancreatitis, DM2 on insulin, diabetic neuropathy, ESRD (5/2018) on HD (M/W/F since 5/18) presents to [facility] as a transfer from [place] after she went to HD today  and had  an episode of syncope and change in mental status. Patient had HD around 11am today, and 30 mins.,  into HD patient vomited and had generalized weakness.     HD was stopped, patient was awake and alert, and sepsis work up was done, WBC count 21K. Patient received 1st dose of vancomycin and RN reported she became unresponsive, L gaze preference, and was perseverating.   Patient went for emergent CTH which revealed evolving L posterior cerebral artery infarct with new hemorrhage. Patient was subsequently intubated due to concern for deteriorating. Patient was then transferred to [facility].     Of note, patient was recently admitted to [place] on 4/12/21 for hypoxic/hypercapnic respiratory  failure and hyperkalemia secondary to ESRD. While admitted pt was found to be confused, MRI obtained revealed large acute infarct of the left PCA. Pt was originally on Asprin which was changed to Plavix. Patient was discharged to rehab on Plavix.    Vascular surgery consulted for evaluation of LUE AVF. Patient did not tolerated last HD 3 days ago and ICU team inserted right groin Shiley, currently undergoing HD.       ROS: 10-system review is otherwise negative except HPI above.      PAST MEDICAL & SURGICAL HISTORY:  Hypertension    Myocardial infarction  2007    Diabetes  Type 2 DM    CAD (coronary artery disease)  s/p stent x 1 in 2007    ESRD (end stage renal disease) on dialysis  MWF since 05/2018    Liver abscess  resolved    Acute urinary retention    Cholecystitis with cholangitis    Chronic pancreatitis    PAF (paroxysmal atrial fibrillation)  1 episode in 11/2018 while hospitalized for acute cholecystitis    H/O diabetic neuropathy    H/O: hysterectomy  1990    History of biliary stent insertion  11/2018 &amp; removal 7/2/19    S/P arteriovenous (AV) fistula creation  05/17/2019    Cataract  IOL b/l      FAMILY HISTORY:  No pertinent family history in first degree relatives      Family history not pertinent as reviewed with the patient.    SOCIAL HISTORY:  Denies any toxic habits    ALLERGIES: NKA ertapenem (Urticaria)  Purell (Rash)      HOME MEDICATIONS:   aspirin 81 mg oral tablet: 1 tab(s) orally once a day (29 Apr 2021 13:40)  atorvastatin 80 mg oral tablet: 1 tab(s) orally once a day (at bedtime) (29 Apr 2021 13:40)  Basaglar KwikPen 100 units/mL subcutaneous solution: 20 unit(s) subcutaneous once a day (at bedtime)    *As per patient and daughter in law, she takes 10units in the morning and then 10 units at night. (29 Apr 2021 13:40)  clopidogrel 75 mg oral tablet: 1 tab(s) orally once a day (29 Apr 2021 13:40)  gabapentin 300 mg oral capsule: 2 cap(s) orally 2 times a day (29 Apr 2021 13:40)  isosorbide mononitrate 60 mg oral tablet, extended release: 1 tab(s) orally once a day (in the morning) (29 Apr 2021 13:40)  Metoprolol Succinate ER 25 mg oral tablet, extended release: 0.5 tab(s) orally once a day (29 Apr 2021 13:40)  Nephro-Jose Daniel oral tablet: 0.8 mg 1 tab(s) orally once a day (29 Apr 2021 13:40)  NovoLOG 100 units/mL injectable solution: 5 unit(s) injectable 3 times a day (29 Apr 2021 13:40)  Velphoro 2500 mg (500 mg elemental iron) oral tablet, chewable: 1 tab(s) orally 3 times a day (with meals)    *Patient typically takes BID as per daughter (29 Apr 2021 13:40)      --------------------------------------------------------------------------------------------    PHYSICAL EXAM:   General: Sedated  Neuro: Sedated  HEENT: ETT in place. NGT in place  Cardio: RRR  Resp: Non labored breathing on RA  GI/Abd: Soft, NT/ND, no rebound/guarding, no masses palpated. R groin Shiley catheter.   Vascular: Left radiocephalic AVF with no palpable thrill. Palpable pulse proximally.   Pelvis: stable  --------------------------------------------------------------------------------------------    LABS                 9.9    16.22  )----------(  358       ( 30 Apr 2021 04:55 )               30.8      140    |  102    |  51.0   ----------------------------<  159        ( 30 Apr 2021 04:55 )  3.7     |  23.0   |  7.17     Ca    8.5        ( 30 Apr 2021 04:55 )  Phos  5.1       ( 30 Apr 2021 04:55 )  Mg     2.3       ( 30 Apr 2021 04:55 )            CAPILLARY BLOOD GLUCOSE          ABG - ( 29 Apr 2021 01:10 )  pH: 7.35  /  pCO2: 35    /  pO2: 189   / HCO3: 20    / Base Excess: -5.6  /  SaO2: 99                  --------------------------------------------------------------------------------------------  IMAGING  < from: US Duplex Hemodialysis Access (04.29.21 @ 23:17) >   EXAM:  US DPLX HEMODIALYSIS ACCESS                          PROCEDURE DATE:  04/29/2021          INTERPRETATION:  Clinical information: End-stage renal disease on hemodialysis. Left upper extremity AV fistula, concern for thrombosed fistula.    COMPARISON: None available.    TECHNIQUE: Duplex study left upper extremity hemodialysis access. Limited study.    FINDINGS: There appears to be a radiocephalic fistula in the distal left forearm. The fistula is patent, but with low blood flow velocities. The cephalic outflow vein is patent in the mid and distal forearm, but appears thrombosed in the proximal forearm.    IMPRESSION: Limited study.    Thrombosis of cephalic outflow vein in the proximal left forearm.    < end of copied text >

## 2021-04-30 NOTE — PROGRESS NOTE ADULT - SUBJECTIVE AND OBJECTIVE BOX
SUMMARY:HPI:  72 y/o female with PMHx CAD s/p stents '07, HTN, MI, PAF, liver abscess, s/p biliary stent with removal (11/2018; 7/2/19), chronic pancreatitis, DM2 on insulin, diabetic neuropathy, ESRD (5/2018) on HD (M/W/F since 5/18) presents to [facility] as a transfer from [place] after she went to HD today with and had  an episode of syncope and change in mental status. Patient had HD around 11am4/28/21  and 30 mins into HD patient vomited and had generalized weakness. HD was stopped, patient was awake and alert, and sepsis work up was done, WBC count 21K. Patient received 1st dose of vancomycin and RN reported she became unresponsive, L gaze preference, and was perseverating. Patient went for emergent CTH which revealed evolving L posterior cerebral artery infarct with new hemorrhage. Patient was subsequently intubated due to concern for deteriorating. Patient was then transferred to [facility].     Of note, patient was recently admitted to [place] on 4/12/21 for hypoxic/hypercapnic resp failure and hyperkalemia secondary to ESRD. While admitted pt was found to be confused, MRI obtained revealed large acute infarct of the left PCA. Pt was originally on Asprin which was changed to Plavix. Patient was discharged to rehab on Plavix.    Neurosurgery/NSICU team evaluated patient at bedside, patient unable to provide further history due to mental status/intubation.  (28 Apr 2021 17:33)    At 1030 pm 4/28/21- PEA arrest and CPR 30secs and subsequently extubated and reintubated     Overnight events     Allergies    ertapenem (Urticaria)  Purell (Rash)    Intolerances        REVIEW OF SYSTEMS: [X ] Unable to Assess due to neurologic exam    Neuro: [ ] Headache [ ] Back pain [ ] Numbness [ ] Weakness [ ] Ataxia [ ] Dizziness [ ] Aphasia [ ] Dysarthria [ ] Visual disturbance  Resp: [ ] Shortness of breath/dyspnea, [ ] Orthopnea [ ] Cough  CV: [ ] Chest pain [ ] Palpitation [ ] Lightheadedness [ ] Syncope  Renal: [ ] Thirst [ ] Edema  GI: [ ] Nausea [ ] Emesis [ ] Abdominal pain [ ] Constipation [ ] Diarrhea  Hem: [ ] Hematemesis [ ] bright red blood per rectum  ID: [ ] Fever [ ] Chills [ ] Dysuria  ENT: [ ] Rhinorrhea    DEVICES:   [X ] Restraints [x ] ET tube [ ] central line [X ] R Fem  arterial line [x ] NGT/OGT     ICU Vital Signs Last 24 Hrs  T(C): 36.2 (30 Apr 2021 07:45), Max: 37.5 (29 Apr 2021 08:00)  T(F): 97.2 (30 Apr 2021 07:45), Max: 99.5 (29 Apr 2021 08:00)  HR: 69 (30 Apr 2021 06:30) (61 - 101)  BP: 84/49 (29 Apr 2021 15:15) (82/41 - 110/30)  BP(mean): 59 (29 Apr 2021 15:15) (54 - 59)  ABP: 153/52 (30 Apr 2021 06:30) (85/47 - 222/75)  ABP(mean): 88 (30 Apr 2021 06:30) (57 - 251)  RR: 17 (30 Apr 2021 06:30) (13 - 31)  SpO2: 100% (30 Apr 2021 06:00) (65% - 100%)      CAPILLARY BLOOD GLUCOSE  CAPILLARY BLOOD GLUCOSE      POCT Blood Glucose.: 150 mg/dL (30 Apr 2021 06:45)  POCT Blood Glucose.: 207 mg/dL (29 Apr 2021 23:52)  POCT Blood Glucose.: 147 mg/dL (29 Apr 2021 17:03)  POCT Blood Glucose.: 172 mg/dL (29 Apr 2021 11:30)  POCT Blood Glucose.: 134 mg/dL (29 Apr 2021 09:41)      Mode:AC  FiO2: 30  PEEP: 5  TV- 450  PS: 5  MAP: 8      LABS:    04-30    140  |  102  |  51.0<H>  ----------------------------<  159<H>  3.7   |  23.0  |  7.17<H>    Ca    8.5<L>      30 Apr 2021 04:55  Phos  5.1     04-30  Mg     2.3     04-30    TPro  6.5<L>  /  Alb  2.9<L>  /  TBili  0.4  /  DBili  x   /  AST  20  /  ALT  7   /  AlkPhos  72  04-29 04-28    136  |  87<L>  |  60.0<H>  ----------------------------<  329<H>  4.2   |  18.0<L>  |  7.36<H>    Ca    9.2      28 Apr 2021 23:01  Phos  8.4     04-28  Mg     2.5     04-28    TPro  7.5  /  Alb  3.3  /  TBili  0.4  /  DBili  x   /  AST  22  /  ALT  8   /  AlkPhos  88  04-28                          12.0   30.65 )-----------( 492      ( 28 Apr 2021 23:01 )             39.6       29 Apr 2021 07:01  -  30 Apr 2021 07:00  --------------------------------------------------------  IN:    Dexmedetomidine: 505.5 mL    IV PiggyBack: 100 mL    Norepinephrine: 115 mL  Total IN: 720.5 mL    OUT:    Nasogastric/Oral tube (mL): 250 mL  Total OUT: 250 mL    Total NET: 470.5 mL            STROKE CORE MEASURES:   HGBA1C - 8.7  Lipid - LDL- 41 / Trig- 83  TSH and Free T4- 1.15        IMAGING/DATA: [X ] Reviewed  : CT Head No Cont (04.29.21 @ 09:20) >  INTERPRETATION:  CT brain without contrast    History intracranial hemorrhage    Comparison yesterday    Left posterior temporal and occipital gyriform hyperattenuation is stable in degree since the prior exam with maturing encephalomalacia. There is no new hemorrhage or cortical edema, mass effect, hydrocephalus or midline shift..    IMPRESSION:  Maturing infarct and developing laminar necrosis as above    MEDICATIONS  (STANDING):  chlorhexidine 0.12% Liquid 15 milliLiter(s) Oral Mucosa every 12 hours  chlorhexidine 2% Cloths 1 Application(s) Topical daily  dexMEDEtomidine Infusion 0.1 MICROgram(s)/kG/Hr (2 mL/Hr) IV Continuous <Continuous>  dextrose 40% Gel 15 Gram(s) Oral once  dextrose 5%. 1000 milliLiter(s) (50 mL/Hr) IV Continuous <Continuous>  dextrose 5%. 1000 milliLiter(s) (100 mL/Hr) IV Continuous <Continuous>  dextrose 50% Injectable 25 Gram(s) IV Push once  dextrose 50% Injectable 12.5 Gram(s) IV Push once  dextrose 50% Injectable 25 Gram(s) IV Push once  glucagon  Injectable 1 milliGRAM(s) IntraMuscular once  insulin lispro (ADMELOG) corrective regimen sliding scale   SubCutaneous every 6 hours  Nephro-debbie 1 Tablet(s) Oral daily  norepinephrine Infusion 0.05 MICROgram(s)/kG/Min (8.45 mL/Hr) IV Continuous <Continuous>  pantoprazole  Injectable 40 milliGRAM(s) IV Push daily  valproate sodium IVPB 500 milliGRAM(s) IV Intermittent every 12 hours    MEDICATIONS  (PRN):  fentaNYL    Injectable 25 MICROGram(s) IV Push every 2 hours PRN Severe Pain (7 - 10)  hydrALAZINE Injectable 10 milliGRAM(s) IV Push every 2 hours PRN SBP >160  labetalol Injectable 10 milliGRAM(s) IV Push every 2 hours PRN Systolic blood pressure > 160 mmHg  LORazepam   Injectable 2 milliGRAM(s) IV Push once PRN Agitation      EKG - Bifasicular Block with ST depression   Troponins - neg  ECHO-EF - 55%; Mild MR/TR ; Grade 1 Diastolic Dysfunctioon     I&O's Summary    28 Apr 2021 07:01  -  29 Apr 2021 07:00  --------------------------------------------------------  IN: 1277.1 mL / OUT: 300 mL / NET: 977.1 mL    UA- neg  Blood Cultures- 4/28/21- No growth        EXAMINATION:  PHYSICAL EXAM:    Constitutional: No Acute Distress     Neurological:  On precedex at 0.5 mcg/kg/min ; does open eyes to command ; Pupils 2mm /3mm m R/L reactive ,     Motor exam:          Upper extremity                         Delt     Bicep     Tricep    HG                                                 R        4+/5                                               L         4+/5          Lower extremity                        HF         KF        KE       DF         PF                                                  R        3/5 throughout                                               L        3/5 throughout                                                  Sensation:   [ X] decreased: - Hxof neuropathy       Pulmonary: Clear to Auscultation, No rales, No rhonchi, No wheezes     Cardiovascular: S1, S2, Regular rate and rhythm     Gastrointestinal: Soft, Non-tender, Non-distended     Extremities: No calf tenderness        SUMMARY:HPI:  70 y/o female with PMHx CAD s/p stents '07, HTN, MI, PAF, liver abscess, s/p biliary stent with removal (11/2018; 7/2/19), chronic pancreatitis, DM2 on insulin, diabetic neuropathy, ESRD (5/2018) on HD (M/W/F since 5/18) presents to [facility] as a transfer from [place] after she went to HD today with and had  an episode of syncope and change in mental status. Patient had HD around 11am4/28/21  and 30 mins into HD patient vomited and had generalized weakness. HD was stopped, patient was awake and alert, and sepsis work up was done, WBC count 21K. Patient received 1st dose of vancomycin and RN reported she became unresponsive, L gaze preference, and was perseverating. Patient went for emergent CTH which revealed evolving L posterior cerebral artery infarct with new hemorrhage. Patient was subsequently intubated due to concern for deteriorating. Patient was then transferred to [facility].     Of note, patient was recently admitted to [place] on 4/12/21 for hypoxic/hypercapnic resp failure and hyperkalemia secondary to ESRD. While admitted pt was found to be confused, MRI obtained revealed large acute infarct of the left PCA. Pt was originally on Asprin which was changed to Plavix. Patient was discharged to rehab on Plavix.    Neurosurgery/NSICU team evaluated patient at bedside, patient unable to provide further history due to mental status/intubation.  (28 Apr 2021 17:33)    At 1030 pm 4/28/21- PEA arrest and CPR 30secs and subsequently extubated and reintubated     Overnight events failed SBT     Allergies    ertapenem (Urticaria)  Purell (Rash)    Intolerances        REVIEW OF SYSTEMS: [X ] Unable to Assess due to neurologic exam    Neuro: [ ] Headache [ ] Back pain [ ] Numbness [ ] Weakness [ ] Ataxia [ ] Dizziness [ ] Aphasia [ ] Dysarthria [ ] Visual disturbance  Resp: [ ] Shortness of breath/dyspnea, [ ] Orthopnea [ ] Cough  CV: [ ] Chest pain [ ] Palpitation [ ] Lightheadedness [ ] Syncope  Renal: [ ] Thirst [ ] Edema  GI: [ ] Nausea [ ] Emesis [ ] Abdominal pain [ ] Constipation [ ] Diarrhea  Hem: [ ] Hematemesis [ ] bright red blood per rectum  ID: [ ] Fever [ ] Chills [ ] Dysuria  ENT: [ ] Rhinorrhea    DEVICES:   [X ] Restraints [x ] ET tube [ ] central line [X ] R Fem  arterial line [x ] NGT/OGT     ICU Vital Signs Last 24 Hrs  T(C): 36.2 (30 Apr 2021 07:45), Max: 37.5 (29 Apr 2021 08:00)  T(F): 97.2 (30 Apr 2021 07:45), Max: 99.5 (29 Apr 2021 08:00)  HR: 69 (30 Apr 2021 06:30) (61 - 101)  BP: 84/49 (29 Apr 2021 15:15) (82/41 - 110/30)  BP(mean): 59 (29 Apr 2021 15:15) (54 - 59)  ABP: 153/52 (30 Apr 2021 06:30) (85/47 - 222/75)  ABP(mean): 88 (30 Apr 2021 06:30) (57 - 251)  RR: 17 (30 Apr 2021 06:30) (13 - 31)  SpO2: 100% (30 Apr 2021 06:00) (65% - 100%)      CAPILLARY BLOOD GLUCOSE  CAPILLARY BLOOD GLUCOSE      POCT Blood Glucose.: 150 mg/dL (30 Apr 2021 06:45)  POCT Blood Glucose.: 207 mg/dL (29 Apr 2021 23:52)  POCT Blood Glucose.: 147 mg/dL (29 Apr 2021 17:03)  POCT Blood Glucose.: 172 mg/dL (29 Apr 2021 11:30)  POCT Blood Glucose.: 134 mg/dL (29 Apr 2021 09:41)      Mode:AC  FiO2: 30  PEEP: 5  R- 15  TV- 450  PS: 5  MAP: 8      LABS:    04-30    140  |  102  |  51.0<H>  ----------------------------<  159<H>  3.7   |  23.0  |  7.17<H>    Ca    8.5<L>      30 Apr 2021 04:55  Phos  5.1     04-30  Mg     2.3     04-30    TPro  6.5<L>  /  Alb  2.9<L>  /  TBili  0.4  /  DBili  x   /  AST  20  /  ALT  7   /  AlkPhos  72  04-29 04-28    136  |  87<L>  |  60.0<H>  ----------------------------<  329<H>  4.2   |  18.0<L>  |  7.36<H>    Ca    9.2      28 Apr 2021 23:01  Phos  8.4     04-28  Mg     2.5     04-28    TPro  7.5  /  Alb  3.3  /  TBili  0.4  /  DBili  x   /  AST  22  /  ALT  8   /  AlkPhos  88  04-28                          9.9    16.22 )-----------( 358      ( 30 Apr 2021 04:55 )             30.8                             12.0   30.65 )-----------( 492      ( 28 Apr 2021 23:01 )             39.6       29 Apr 2021 07:01  -  30 Apr 2021 07:00  --------------------------------------------------------  IN:    Dexmedetomidine: 505.5 mL    IV PiggyBack: 100 mL    Norepinephrine: 115 mL  Total IN: 720.5 mL    OUT:    Nasogastric/Oral tube (mL): 250 mL  Total OUT: 250 mL    Total NET: 470.5 mL            STROKE CORE MEASURES:   HGBA1C - 8.7  Lipid - LDL- 41 / Trig- 83  TSH and Free T4- 1.15        IMAGING/DATA: [X ] Reviewed  : CT Head No Cont (04.29.21 @ 09:20) >  INTERPRETATION:  CT brain without contrast    History intracranial hemorrhage    Comparison yesterday    Left posterior temporal and occipital gyriform hyperattenuation is stable in degree since the prior exam with maturing encephalomalacia. There is no new hemorrhage or cortical edema, mass effect, hydrocephalus or midline shift..    IMPRESSION:  Maturing infarct and developing laminar necrosis as above    MEDICATIONS  (STANDING):  chlorhexidine 0.12% Liquid 15 milliLiter(s) Oral Mucosa every 12 hours  chlorhexidine 2% Cloths 1 Application(s) Topical daily  dexMEDEtomidine Infusion 0.1 MICROgram(s)/kG/Hr (2 mL/Hr) IV Continuous <Continuous>  dextrose 40% Gel 15 Gram(s) Oral once  dextrose 5%. 1000 milliLiter(s) (50 mL/Hr) IV Continuous <Continuous>  dextrose 5%. 1000 milliLiter(s) (100 mL/Hr) IV Continuous <Continuous>  dextrose 50% Injectable 25 Gram(s) IV Push once  dextrose 50% Injectable 12.5 Gram(s) IV Push once  dextrose 50% Injectable 25 Gram(s) IV Push once  glucagon  Injectable 1 milliGRAM(s) IntraMuscular once  insulin lispro (ADMELOG) corrective regimen sliding scale   SubCutaneous every 6 hours  Nephro-debbie 1 Tablet(s) Oral daily  Midodrine 5mg q 8   pantoprazole  Injectable 40 milliGRAM(s)  Push daily  valproate sodium IVPB 500 milliGRAM(s) po  Intermittent every 12 hours    MEDICATIONS  (PRN):  fentaNYL    Injectable 25 MICROGram(s) IV Push every 2 hours PRN Severe Pain (7 - 10)  hydrALAZINE Injectable 10 milliGRAM(s) IV Push every 2 hours PRN SBP >160  labetalol Injectable 10 milliGRAM(s) IV Push every 2 hours PRN Systolic blood pressure > 160 mmHg  LORazepam   Injectable 2 milliGRAM(s) IV Push once PRN Agitation      EKG - Bifasicular Block with ST depression   Troponins - neg  ECHO-EF - 55%; Mild MR/TR ; Grade 1 Diastolic Dysfunctioon     I&O's Summary    28 Apr 2021 07:01  -  29 Apr 2021 07:00  --------------------------------------------------------  IN: 1277.1 mL / OUT: 300 mL / NET: 977.1 mL    UA- neg  Blood Cultures- 4/28/21- No growth        EXAMINATION:  PHYSICAL EXAM:    Constitutional: No Acute Distress ; follows in April     Neurological:  On precedex at 0.9 mcg/kg/min ; does open eyes to command ; Pupils 2mm /3mm m R/L reactive ,     Motor exam:          Upper extremity                         Delt     Bicep     Tricep    HG                                                 R        4+/5                                               L         4+/5          Lower extremity                        HF         KF        KE       DF         PF                                                  R        3/5 throughout                                               L        3/5 throughout                                                  Sensation:   [ X] decreased: - Hx of neuropathy       Pulmonary: Clear to Auscultation, No rales, No rhonchi, No wheezes     Cardiovascular: S1, S2, Regular rate and rhythm     Gastrointestinal: Soft, Non-tender, Non-distended     Extremities: No calf tenderness

## 2021-04-30 NOTE — DIETITIAN INITIAL EVALUATION ADULT. - ENTERAL
if pt remains intubated, initiate Nepro @ 10 ml/hr and advance 10 ml/hr q4 hrs until goal rate of 50 ml/hr (x20 hrs) to provide 1000 ml, 1800 kcal, 81g protein, 727 ml free water, and 100% of RDIs for vitamins/minerals; additional free water per MD discretion.

## 2021-04-30 NOTE — PATIENT PROFILE ADULT - DO YOU FEEL UNSAFE AT HOME, WORK, OR SCHOOL?
Pt brought up from PACU into room 309. Pt very lethargic. Pt on 2L o2. Denies any pain at this time. No nausea. Family at bedside.   no

## 2021-04-30 NOTE — DISCHARGE NOTE PROVIDER - NSDCMRMEDTOKEN_GEN_ALL_CORE_FT
aspirin 81 mg oral tablet: 1 tab(s) orally once a day  atorvastatin 80 mg oral tablet: 1 tab(s) orally once a day (at bedtime)  Basaglar KwikPen 100 units/mL subcutaneous solution: 20 unit(s) subcutaneous once a day (at bedtime)    *As per patient and daughter in law, she takes 10units in the morning and then 10 units at night.  clopidogrel 75 mg oral tablet: 1 tab(s) orally once a day  gabapentin 300 mg oral capsule: 2 cap(s) orally 2 times a day  isosorbide mononitrate 60 mg oral tablet, extended release: 1 tab(s) orally once a day (in the morning)  Metoprolol Succinate ER 25 mg oral tablet, extended release: 0.5 tab(s) orally once a day  Nephro-Jose Daniel oral tablet: 0.8 mg 1 tab(s) orally once a day  NovoLOG 100 units/mL injectable solution: 5 unit(s) injectable 3 times a day  Velphoro 2500 mg (500 mg elemental iron) oral tablet, chewable: 1 tab(s) orally 3 times a day (with meals)    *Patient typically takes BID as per daughter

## 2021-04-30 NOTE — PATIENT PROFILE ADULT - NSTRANSFERBELONGINGSDISPO_GEN_A_NUR
Pt asking to go to the bathroom, pt unable to sit up on the side of the bed. Pt assisted with female urine due to weakness.    with patient

## 2021-04-30 NOTE — DIETITIAN INITIAL EVALUATION ADULT. - OTHER INFO
71 year old female PMH HTN, CAD s/p MI with PCI, DM2 with associated diabetic neuropathy, ESRD on HD, chronic pancreatitis, paroxysmal afib, recent admission for pneumonia with hospital course complicated with afib, L PCA CVA. Now admitted with left posterior cerebral artery infarct with new hemorrhage. Pt currently intubated, NPO at this time. Aware of plan for possible extubation today. Per EMR review, pts weight on 4/14/21 was 198 lbs; current admission weight 198.6 lbs- appears with no changes. RD to follow up.

## 2021-04-30 NOTE — PROGRESS NOTE ADULT - SUBJECTIVE AND OBJECTIVE BOX
[facility] DIVISION OF KIDNEY DISEASES AND HYPERTENSION -- FOLLOW UP NOTE  --------------------------------------------------------------------------------  Chief Complaint: ESRD HD    24 hour events/subjective:  HD today;  Getting new femoral catheter; poor flows yesterday; had to discontinue      PAST HISTORY  --------------------------------------------------------------------------------  No significant changes to PMH, PSH, FHx, SHx, unless otherwise noted    ALLERGIES & MEDICATIONS  --------------------------------------------------------------------------------  Allergies    ertapenem (Urticaria)  Purell (Rash)    Intolerances      Standing Inpatient Medications  chlorhexidine 0.12% Liquid 15 milliLiter(s) Oral Mucosa every 12 hours  chlorhexidine 2% Cloths 1 Application(s) Topical daily  dexMEDEtomidine Infusion 0.1 MICROgram(s)/kG/Hr IV Continuous <Continuous>  dextrose 40% Gel 15 Gram(s) Oral once  dextrose 5%. 1000 milliLiter(s) IV Continuous <Continuous>  dextrose 5%. 1000 milliLiter(s) IV Continuous <Continuous>  dextrose 50% Injectable 25 Gram(s) IV Push once  dextrose 50% Injectable 12.5 Gram(s) IV Push once  dextrose 50% Injectable 25 Gram(s) IV Push once  glucagon  Injectable 1 milliGRAM(s) IntraMuscular once  insulin lispro (ADMELOG) corrective regimen sliding scale   SubCutaneous every 6 hours  Nephro-debbie 1 Tablet(s) Oral daily  norepinephrine Infusion 0.05 MICROgram(s)/kG/Min IV Continuous <Continuous>  pantoprazole  Injectable 40 milliGRAM(s) IV Push daily  valproate sodium IVPB 500 milliGRAM(s) IV Intermittent every 12 hours    PRN Inpatient Medications  fentaNYL    Injectable 25 MICROGram(s) IV Push every 2 hours PRN  hydrALAZINE Injectable 10 milliGRAM(s) IV Push every 2 hours PRN  labetalol Injectable 10 milliGRAM(s) IV Push every 2 hours PRN      REVIEW OF SYSTEMS  --------------------------------------------------------------------------------  Unable to obtain    VITALS/PHYSICAL EXAM  --------------------------------------------------------------------------------  T(C): 37.1 (04-30-21 @ 11:36), Max: 37.1 (04-30-21 @ 11:36)  HR: 72 (04-30-21 @ 12:15) (61 - 87)  BP: 177/122 (04-30-21 @ 07:45) (84/49 - 177/122)  RR: 18 (04-30-21 @ 12:15) (13 - 31)  SpO2: 99% (04-30-21 @ 12:15) (65% - 100%)  Wt(kg): --  Height (cm): 157.5 (04-28-21 @ 16:44)  Weight (kg): 90.1 (04-28-21 @ 21:25)  BMI (kg/m2): 36.3 (04-28-21 @ 21:25)  BSA (m2): 1.91 (04-28-21 @ 21:25)      04-29-21 @ 07:01  -  04-30-21 @ 07:00  --------------------------------------------------------  IN: 720.5 mL / OUT: 250 mL / NET: 470.5 mL    04-30-21 @ 07:01  -  04-30-21 @ 12:43  --------------------------------------------------------  IN: 45.2 mL / OUT: 0 mL / NET: 45.2 mL      Physical Exam:  Constitutional: No Acute Distress   Neurological: Awake, alert oriented to person, place and time, Following Command  Pulmonary: Clear to Auscultation, No rales, No rhonchi, No wheezes   Cardiovascular: S1, S2, Regular rate and rhythm   Gastrointestinal: Soft, Non-tender, Non-distended   Extremities: No calf tenderness , AVF - L Forearm +    LABS/STUDIES  --------------------------------------------------------------------------------              9.9    16.22 >-----------<  358      [04-30-21 @ 04:55]              30.8     140  |  102  |  51.0  ----------------------------<  159      [04-30-21 @ 04:55]  3.7   |  23.0  |  7.17        Ca     8.5     [04-30-21 @ 04:55]      Mg     2.3     [04-30-21 @ 04:55]      Phos  5.1     [04-30-21 @ 04:55]    TPro  6.5  /  Alb  2.9  /  TBili  0.4  /  DBili  x   /  AST  20  /  ALT  7   /  AlkPhos  72  [04-29-21 @ 17:56]        Troponin 0.30      [04-29-21 @ 04:44]    Creatinine Trend:  SCr 7.17 [04-30 @ 04:55]  SCr 8.23 [04-29 @ 17:56]  SCr 7.36 [04-28 @ 23:01]  SCr 6.20 [04-28 @ 20:11]  SCr 6.60 [04-28 @ 11:47]        HbA1c 9.4      [02-07-20 @ 21:14]  TSH 1.15      [04-14-21 @ 08:13]  Lipid: chol 106, , HDL 40, LDL --      [04-30-21 @ 04:55]    HBsAb <3.0      [04-13-21 @ 02:54]  HBsAg Nonreact      [04-13-21 @ 02:54]  HCV 0.22, Nonreact      [04-13-21 @ 02:54]

## 2021-04-30 NOTE — DISCHARGE NOTE PROVIDER - NSDCCPCAREPLAN_GEN_ALL_CORE_FT
PRINCIPAL DISCHARGE DIAGNOSIS  Diagnosis: Subdural hemorrhage  Assessment and Plan of Treatment: No improvment in clincal status., Plan for hospice      SECONDARY DISCHARGE DIAGNOSES  Diagnosis: Dyspnea and respiratory abnormalities  Assessment and Plan of Treatment: Dyspnea and respiratory abnormalities    Diagnosis: Debility  Assessment and Plan of Treatment: Debility    Diagnosis: ESRD (end stage renal disease) on dialysis  Assessment and Plan of Treatment: ESRD (end stage renal disease) on dialysis    Diagnosis: DM (diabetes mellitus)  Assessment and Plan of Treatment:

## 2021-04-30 NOTE — PROGRESS NOTE ADULT - ASSESSMENT
71yF PMH HTN, CAD s/p MI with h/o PCI 2007, DM2 with associated diabetic neuropathy, ESRD on HD MWF 5/2018, chronic pancreatitis, paroxysmal afib 2018, recent admission for pneumonia with hospital course complicated with afib, L PCA CVA discharged on Plavix (not ACT as patient high risk for bleed), presented to [facility] ~ 11 AM, was at hemodialysis, received 30 minutes of HD, vomited x 1 and complained of generalized weakness, brought to ED, found with WBC 21k s/p Zosyn/Vancomycin, developed seizure like activity, CTH done found with hemorrhagic conversion in L PCA  - GCS E-2 V-1T M-5= 8    NEURO:  - Q1 neuro checks  - Repeat CTH stable   - Valproic acid 500mg q 12  Check EEG   - OFF Plavix + ASA for prior stroke   -  precedex for agitation   - Activity: [] mobilize as tolerated [x] Bedrest for now [] PT [] OT [] PMNR    PULM:  - 15/450/40/5  - CPAP as tolerated once on precedex  - SpO2 goal > 92%    CV: CAD/ Hx of Bifascicular Block ; Hx of PAF   - SBP goal 100-150  - Tolerate SBP > 100 or MAP > 65 - wean norepinephrine after dialysis  - Watch HR-- lopressor IVP 5 Q6 PRN for now until PO access obtained  - Lipid LDL 41 from 4/14, no need for statin at this point  - TTE - P ; Will Need LIBIA  - EKG obtained     RENAL: ESRD - dialysis - M/W/F since 2018   dialysis today   Nephro VIT  - Fluids: NS @ 50 for now  -     GI:  - Diet: ho;ld and see if possible extubation   - GI prophylaxis  [x] PPI   - Bowel regimen [] colace [x] senna once PO access obtained [x] other: miralax once PO access obtained  - Last BM 4/29/21   - LFT - nl     ENDO:   - Goal euglycemia (-180)  - Q6 finger sticks   A1C 8.7% 4/13/21, TSH 1.15 4/14/21    HEME/ONC:  - VTE prophylaxis: [x] SCDs [] chemoprophylaxis [x] chemoprophylaxis due to: acute ICH     ID:  - F/u blood cultures from [place]- neg to date   - Monitor for fever,  - Leucocytosis/ Prior warming blanket       SOCIAL/FAMILY:  [x] awaiting [] updated at bedside [] family meeting    CODE STATUS:  [x] Full Code [] DNR [] DNI [] Palliative/Comfort Care    DISPOSITION:  [x] ICU     [x] Patient is at high risk of neurologic deterioration/death due to: seizure, re-bleed, respiratory failure    Time spent: 40 critical care minutes   71yF PMH HTN, CAD s/p MI with h/o PCI 2007, DM2 with associated diabetic neuropathy, ESRD on HD MWF 5/2018, chronic pancreatitis, paroxysmal afib 2018, recent admission for pneumonia with hospital course complicated with afib, L PCA CVA discharged on Plavix (not ACT as patient high risk for bleed), presented to [facility] ~ 11 AM, was at hemodialysis, received 30 minutes of HD, vomited x 1 and complained of generalized weakness, brought to ED, found with WBC 21k s/p Zosyn/Vancomycin, developed seizure like activity, CTH done found with hemorrhagic conversion in L PCA  - GCS E-2 V-1T M-5= 8    NEURO:  - Q2 neuro checks  - Repeat CTH stable   - Valproic acid 500mg q 12  - Check EEG - No seizures  - OFF Plavix + ASA for prior stroke   -  precedex wean and trial of Haldol 0.5 mg IV x1   - Activity: [] mobilize as tolerated [x] Bedrest for now [] PT [] OT [] PMNR    PULM:  - 15/450/40/5  - CPAP failed , daily to BID trial   - SpO2 goal > 92%    CV: CAD/ Hx of Bifascicular Block ; Hx of PAF   - SBP goal 100-150  - Tolerate SBP > 100 or MAP > 65 - wean norepinephrine after dialysis  - Watch HR-- lopressor IVP 5 Q6 PRN for now until PO access obtained  - Lipid LDL 41 from 4/14, no need for statin at this point  - TTE - EF 45-50%;  ; Will Need LIBIA  - EKG   Midodrine to wean norepinephrine      RENAL: ESRD - dialysis - M/W/F since 2018   dialysis today   Nephro VIT  - Fluids: NS @ 50 for now  - vascular to access L forearm fistula;   Ultrasound- thrombosis of Cephalic outflow vein ; fistula is patent with low velocities  -     GI:  - Diet:glycerna    - GI prophylaxis  [x] PPI   - Bowel regimen [] colace [x] senna once PO access obtained [x] other: miralax once PO access obtained  - Last BM 4/29/21   - LFT - nl     ENDO:   - Goal euglycemia (-180)  - Q6 finger sticks   A1C 8.7% 4/13/21, TSH 1.15 4/14/21    HEME/ONC:  - VTE prophylaxis: [x] SCDs [] chemoprophylaxis [x] chemoprophylaxis due to: acute ICH     ID:  - F/u blood cultures from [place]- neg to date   - Monitor for fever,  - Leucocytosis/ Prior warming blanket       SOCIAL/FAMILY:  [x] awaiting [] updated at bedside [] family meeting    CODE STATUS:  [x] Full Code [] DNR [] DNI [] Palliative/Comfort Care    DISPOSITION:  [x] ICU     [x] Patient is at high risk of neurologic deterioration/death due to: seizure, re-bleed, respiratory failure    Time spent: 40 critical care minutes

## 2021-04-30 NOTE — CONSULT NOTE ADULT - ASSESSMENT
ASSESSMENT: Patient is a 71y old female with cerebral hemorrhagic infarct. LUE AVF appears to be occluded on proximal cephalic outflow. Currently getting HD via right groin Shiley.    PLAN:    - Will evaluate for possible fistulogram next week and possible intervention if medically stable.  - Keep dialyzing via right groin catheter  - Plan discussed with Attending, Dr. Buckley

## 2021-04-30 NOTE — EEG REPORT - NS EEG TEXT BOX
[facility]   COMPREHENSIVE EPILEPSY CENTER   REPORT OF LONG-TERM VIDEO EEG     [facility]: [address], [place] [address], Ph#: [phone]  [facility]: [address], [place] [address], Ph#: [phone]  [facility]: [address] [place] [address], Ph#: [phone]    Patient Name: IRENE TAYLOR  Age and : 71y (46)  MRN #: [id number]  Location: [place] [id number]   Referring Physician: Radha Moore    Start Time/Date: 18:53 on 21  End Time/Date: 08:00 on 21  Duration: 13hr 05m    _____________________________________________________________  STUDY INFORMATION    EEG Recording Technique:  The patient underwent continuous Video-EEG monitoring, using Telemetry System hardware on the XLTek Digital System. EEG and video data were stored on a computer hard drive with important events saved in digital archive files. The material was reviewed by a physician (electroencephalographer / epileptologist) on a daily basis. [place] and seizure detection algorithms were utilized and reviewed. An EEG Technician attended to the patient, and was available throughout daytime work hours.  The epilepsy center neurologist was available in person or on call 24-hours per day.    EEG Placement and Labeling of Electrodes:  The EEG was performed utilizing 20 channel referential EEG connections (coronal over temporal over parasagittal montage) using all standard 10-20 electrode placements with EKG, with additional electrodes placed in the inferior temporal region using the modified 10-10 montage electrode placements for elective admissions, or if deemed necessary. Recording was at a sampling rate of 256 samples per second per channel. Time synchronized digital video recording was done simultaneously with EEG recording. A low light infrared camera was used for low light recording.     _____________________________________________________________  HISTORY    Patient is a 71y old  Female who presents with a chief complaint of CVA with hemorrhagic transformation (2021 07:14)      PERTINENT MEDICATION:  valproate sodium IVPB 500 milliGRAM(s) IV Intermittent every 12 hours    _____________________________________________________________  STUDY INTERPRETATION    Findings: The background was continuous, spontaneously variable and reactive. No posterior dominant rhythm seen.    Background Slowing:  Diffuse theta and polymorphic delta slowing.    Focal Slowing:   None were present.    Sleep Background:  Stage II sleep transients were not recorded.    Other Non-Epileptiform Findings:  None were present.    Interictal Epileptiform Activity:   Occasional multifocal sharp wave discharges in the left frontal (Fp1), left temporal (shifting maximum), left fronto-centro-temporal (max F3/C3/Fz/P7), right frontal (max Fp2), right parasagittal (max F4/C4/FP4) regions.     Events:  Clinical events: None recorded.  Seizures: None recorded.    Activation Procedures:   Hyperventilation was not performed.    Photic stimulation was not performed.     Artifacts:  Intermittent myogenic and movement artifacts were noted.    ECG:  The heart rate on single channel ECG was predominantly between 60-70 BPM.    _____________________________________________________________  EEG SUMMARY/CLASSIFICATION    Abnormal EEG in an altered patient.  - Occasional multifocal sharp wave discharges in the left frontal (Fp1), left temporal (shifting maximum), left fronto-centro-temporal (max F3/C3/Fz/P7), right frontal (max Fp2), right parasagittal (max F4/C4/FP4) regions.   - Moderate to severe generalized slowing.    _____________________________________________________________  EEG IMPRESSION/CLINICAL CORRELATE    Abnormal EEG study.  1. Multifocal potential epileptogenic foci in the bilateral fronto-centro-temporal, left more prominent than right.  2. Moderate to severe nonspecific diffuse or multifocal cerebral dysfunction.   3. No seizure seen.    _____________________________________________________________    Pamela Swan MD  Attending Physician, [facility] [place]

## 2021-05-01 LAB
ANION GAP SERPL CALC-SCNC: 13 MMOL/L — SIGNIFICANT CHANGE UP (ref 5–17)
BASE EXCESS BLDA CALC-SCNC: 1.4 MMOL/L — SIGNIFICANT CHANGE UP (ref -3–3)
BASE EXCESS BLDA CALC-SCNC: 4.1 MMOL/L — HIGH (ref -3–3)
BLOOD GAS COMMENTS ARTERIAL: SIGNIFICANT CHANGE UP
BLOOD GAS COMMENTS ARTERIAL: SIGNIFICANT CHANGE UP
BUN SERPL-MCNC: 37 MG/DL — HIGH (ref 8–20)
CALCIUM SERPL-MCNC: 8.4 MG/DL — LOW (ref 8.6–10.2)
CHLORIDE SERPL-SCNC: 99 MMOL/L — SIGNIFICANT CHANGE UP (ref 98–107)
CO2 SERPL-SCNC: 25 MMOL/L — SIGNIFICANT CHANGE UP (ref 22–29)
CREAT SERPL-MCNC: 5.1 MG/DL — HIGH (ref 0.5–1.3)
GAS PNL BLDA: SIGNIFICANT CHANGE UP
GAS PNL BLDA: SIGNIFICANT CHANGE UP
GLUCOSE BLDC GLUCOMTR-MCNC: 170 MG/DL — HIGH (ref 70–99)
GLUCOSE BLDC GLUCOMTR-MCNC: 172 MG/DL — HIGH (ref 70–99)
GLUCOSE SERPL-MCNC: 160 MG/DL — HIGH (ref 70–99)
HCO3 BLDA-SCNC: 26 MMOL/L — SIGNIFICANT CHANGE UP (ref 20–26)
HCO3 BLDA-SCNC: 28 MMOL/L — HIGH (ref 20–26)
HCT VFR BLD CALC: 31 % — LOW (ref 34.5–45)
HGB BLD-MCNC: 9.6 G/DL — LOW (ref 11.5–15.5)
HOROWITZ INDEX BLDA+IHG-RTO: SIGNIFICANT CHANGE UP
HOROWITZ INDEX BLDA+IHG-RTO: SIGNIFICANT CHANGE UP
MAGNESIUM SERPL-MCNC: 2 MG/DL — SIGNIFICANT CHANGE UP (ref 1.6–2.6)
MCHC RBC-ENTMCNC: 28.7 PG — SIGNIFICANT CHANGE UP (ref 27–34)
MCHC RBC-ENTMCNC: 31 GM/DL — LOW (ref 32–36)
MCV RBC AUTO: 92.8 FL — SIGNIFICANT CHANGE UP (ref 80–100)
PCO2 BLDA: 34 MMHG — LOW (ref 35–45)
PCO2 BLDA: 40 MMHG — SIGNIFICANT CHANGE UP (ref 35–45)
PH BLDA: 7.46 — HIGH (ref 7.35–7.45)
PH BLDA: 7.48 — HIGH (ref 7.35–7.45)
PHOSPHATE SERPL-MCNC: 4 MG/DL — SIGNIFICANT CHANGE UP (ref 2.4–4.7)
PLATELET # BLD AUTO: 287 K/UL — SIGNIFICANT CHANGE UP (ref 150–400)
PO2 BLDA: 107 MMHG — SIGNIFICANT CHANGE UP (ref 83–108)
PO2 BLDA: 180 MMHG — HIGH (ref 83–108)
POTASSIUM SERPL-MCNC: 4.2 MMOL/L — SIGNIFICANT CHANGE UP (ref 3.5–5.3)
POTASSIUM SERPL-SCNC: 4.2 MMOL/L — SIGNIFICANT CHANGE UP (ref 3.5–5.3)
RBC # BLD: 3.34 M/UL — LOW (ref 3.8–5.2)
RBC # FLD: 14 % — SIGNIFICANT CHANGE UP (ref 10.3–14.5)
SAO2 % BLDA: 100 % — HIGH (ref 95–99)
SAO2 % BLDA: 98 % — SIGNIFICANT CHANGE UP (ref 95–99)
SODIUM SERPL-SCNC: 137 MMOL/L — SIGNIFICANT CHANGE UP (ref 135–145)
WBC # BLD: 14.34 K/UL — HIGH (ref 3.8–10.5)
WBC # FLD AUTO: 14.34 K/UL — HIGH (ref 3.8–10.5)

## 2021-05-01 PROCEDURE — 99291 CRITICAL CARE FIRST HOUR: CPT

## 2021-05-01 PROCEDURE — 99233 SBSQ HOSP IP/OBS HIGH 50: CPT

## 2021-05-01 PROCEDURE — 99232 SBSQ HOSP IP/OBS MODERATE 35: CPT | Mod: GC

## 2021-05-01 PROCEDURE — 95718 EEG PHYS/QHP 2-12 HR W/VEEG: CPT

## 2021-05-01 RX ORDER — POLYETHYLENE GLYCOL 3350 17 G/17G
17 POWDER, FOR SOLUTION ORAL DAILY
Refills: 0 | Status: DISCONTINUED | OUTPATIENT
Start: 2021-05-01 | End: 2021-05-03

## 2021-05-01 RX ORDER — SENNA PLUS 8.6 MG/1
1 TABLET ORAL DAILY
Refills: 0 | Status: DISCONTINUED | OUTPATIENT
Start: 2021-05-01 | End: 2021-05-03

## 2021-05-01 RX ORDER — PANTOPRAZOLE SODIUM 20 MG/1
40 TABLET, DELAYED RELEASE ORAL DAILY
Refills: 0 | Status: DISCONTINUED | OUTPATIENT
Start: 2021-05-01 | End: 2021-05-03

## 2021-05-01 RX ORDER — VALPROIC ACID (AS SODIUM SALT) 250 MG/5ML
500 SOLUTION, ORAL ORAL EVERY 12 HOURS
Refills: 0 | Status: DISCONTINUED | OUTPATIENT
Start: 2021-05-01 | End: 2021-05-11

## 2021-05-01 RX ORDER — DIAZEPAM 5 MG
5 TABLET ORAL ONCE
Refills: 0 | Status: DISCONTINUED | OUTPATIENT
Start: 2021-05-01 | End: 2021-05-02

## 2021-05-01 RX ADMIN — PANTOPRAZOLE SODIUM 40 MILLIGRAM(S): 20 TABLET, DELAYED RELEASE ORAL at 11:02

## 2021-05-01 RX ADMIN — PANTOPRAZOLE SODIUM 40 MILLIGRAM(S): 20 TABLET, DELAYED RELEASE ORAL at 23:05

## 2021-05-01 RX ADMIN — Medication 500 MILLIGRAM(S): at 05:11

## 2021-05-01 RX ADMIN — Medication 1 TABLET(S): at 11:02

## 2021-05-01 RX ADMIN — FENTANYL CITRATE 25 MICROGRAM(S): 50 INJECTION INTRAVENOUS at 02:27

## 2021-05-01 RX ADMIN — CHLORHEXIDINE GLUCONATE 15 MILLILITER(S): 213 SOLUTION TOPICAL at 18:10

## 2021-05-01 RX ADMIN — Medication 2: at 18:10

## 2021-05-01 RX ADMIN — CHLORHEXIDINE GLUCONATE 1 APPLICATION(S): 213 SOLUTION TOPICAL at 11:02

## 2021-05-01 RX ADMIN — SENNA PLUS 1 TABLET(S): 8.6 TABLET ORAL at 13:14

## 2021-05-01 RX ADMIN — FENTANYL CITRATE 25 MICROGRAM(S): 50 INJECTION INTRAVENOUS at 00:15

## 2021-05-01 RX ADMIN — FENTANYL CITRATE 25 MICROGRAM(S): 50 INJECTION INTRAVENOUS at 02:45

## 2021-05-01 RX ADMIN — Medication 10 MILLIGRAM(S): at 14:36

## 2021-05-01 RX ADMIN — DEXMEDETOMIDINE HYDROCHLORIDE IN 0.9% SODIUM CHLORIDE 2 MICROGRAM(S)/KG/HR: 4 INJECTION INTRAVENOUS at 18:12

## 2021-05-01 RX ADMIN — DEXMEDETOMIDINE HYDROCHLORIDE IN 0.9% SODIUM CHLORIDE 2 MICROGRAM(S)/KG/HR: 4 INJECTION INTRAVENOUS at 08:00

## 2021-05-01 RX ADMIN — MIDODRINE HYDROCHLORIDE 5 MILLIGRAM(S): 2.5 TABLET ORAL at 05:11

## 2021-05-01 RX ADMIN — Medication 2: at 05:12

## 2021-05-01 RX ADMIN — POLYETHYLENE GLYCOL 3350 17 GRAM(S): 17 POWDER, FOR SOLUTION ORAL at 13:14

## 2021-05-01 RX ADMIN — Medication 8.45 MICROGRAM(S)/KG/MIN: at 17:00

## 2021-05-01 RX ADMIN — CHLORHEXIDINE GLUCONATE 15 MILLILITER(S): 213 SOLUTION TOPICAL at 05:11

## 2021-05-01 RX ADMIN — Medication 27.5 MILLIGRAM(S): at 18:10

## 2021-05-01 RX ADMIN — Medication 2: at 11:02

## 2021-05-01 NOTE — PROGRESS NOTE ADULT - ASSESSMENT
HD On Monday,     Access : Longer Catheter ( 19.5 cms., ) - Non Tunneled , ( RT - Femoral )    D/W NS ( Dr. Whyte )

## 2021-05-01 NOTE — PROGRESS NOTE ADULT - ASSESSMENT
A/P: 70 y/o F w/PMH CAD, DM, ESRD on HD, s/p cerebral hemorrhagic infarct, with LUE AVF occlusion on proximal cephalic outflow. Getting HD via R femoral Shiley.     -Continue HD via right femoral Shiely  -Possible Fistulogram and intervention week of 5/3 if medically stable  -Remainder of management per MICU

## 2021-05-01 NOTE — PROGRESS NOTE ADULT - SUBJECTIVE AND OBJECTIVE BOX
HPI/ OVERNIGHT EVENTS: Patient currently remains intubated and under acute care of NSICU service.     Underwent HD 4/30 without issue via R femoral NTDC,     MEDICATIONS  (STANDING):    dexMEDEtomidine Infusion 0.1 MICROgram(s)/kG/Hr (2 mL/Hr) IV Continuous <Continuous>  insulin lispro (ADMELOG) corrective regimen sliding scale   SubCutaneous every 6 hours  midodrine 5 milliGRAM(s) Oral <User Schedule>  Nephro-debbie 1 Tablet(s) Oral daily  norepinephrine Infusion 0.05 MICROgram(s)/kG/Min (8.45 mL/Hr) IV Continuous <Continuous>  pantoprazole   Suspension 40 milliGRAM(s) Oral daily  valproic  acid Syrup 500 milliGRAM(s) Oral every 12 hours    MEDICATIONS  (PRN):  fentaNYL    Injectable 25 MICROGram(s) IV Push every 2 hours PRN Severe Pain (7 - 10)  hydrALAZINE Injectable 10 milliGRAM(s) IV Push every 2 hours PRN SBP >160  labetalol Injectable 10 milliGRAM(s) IV Push every 2 hours PRN Systolic blood pressure > 160 mmHg    Vital Signs Last 24 Hrs  T(C): 36.9 (01 May 2021 04:06), Max: 37.1 (30 Apr 2021 11:36)  T(F): 98.4 (01 May 2021 04:06), Max: 98.8 (30 Apr 2021 11:36)  HR: 65 (01 May 2021 06:30) (60 - 86)  BP: 101/51 (30 Apr 2021 16:30) (86/46 - 177/122)  BP(mean): 63 (30 Apr 2021 16:30) (57 - 135)  RR: 14 (01 May 2021 06:30) (13 - 25)  SpO2: 100% (01 May 2021 06:30) (61% - 100%)    General: Sedated  Neuro: Sedated  HEENT: ETT in place. NGT in place  Cardio: RRR  Resp: Non labored breathing on RA  GI/Abd: Soft, NT/ND, no rebound/guarding, no masses palpated. R groin Shiley catheter, site c/d/i  Vascular: Left radiocephalic AVF with no palpable thrill. Palpable pulse proximally.   Pelvis: stable    I&O's Detail    30 Apr 2021 07:01  -  01 May 2021 07:00  --------------------------------------------------------  IN:    Dexmedetomidine: 314.8 mL    Glucerna 1.5: 300 mL    Norepinephrine: 14.8 mL  Total IN: 629.6 mL    OUT:    Nasogastric/Oral tube (mL): 150 mL    Other (mL): 500 mL  Total OUT: 650 mL    Total NET: -20.4 mL    LABS:                        9.6    14.34 )-----------( 287      ( 01 May 2021 04:14 )             31.0     05-01    137  |  99  |  37.0<H>  ----------------------------<  160<H>  4.2   |  25.0  |  5.10<H>    Ca    8.4<L>      01 May 2021 04:14  Phos  4.0     05-01  Mg     2.0     05-01    TPro  6.5<L>  /  Alb  2.9<L>  /  TBili  0.4  /  DBili  x   /  AST  20  /  ALT  7   /  AlkPhos  72  04-29        A/P: 70 y/o F w/PMH CAD, DM, ESRD on HD, s/p cerebral hemorrhagic infarct, with LUE AVF occlusion on proximal cephalic outflow.      -Continue HD via right femoral Catheter ,     -Possible Fistulogram and intervention ,

## 2021-05-01 NOTE — PROGRESS NOTE ADULT - SUBJECTIVE AND OBJECTIVE BOX
SUMMARY:HPI:  72 y/o female with PMHx CAD s/p stents '07, HTN, MI, PAF, liver abscess, s/p biliary stent with removal (11/2018; 7/2/19), chronic pancreatitis, DM2 on insulin, diabetic neuropathy, ESRD (5/2018) on HD (M/W/F since 5/18) presents to [facility] as a transfer from [place] after she went to HD today with and had  an episode of syncope and change in mental status. Patient had HD around 11am4/28/21  and 30 mins into HD patient vomited and had generalized weakness. HD was stopped, patient was awake and alert, and sepsis work up was done, WBC count 21K. Patient received 1st dose of vancomycin and RN reported she became unresponsive, L gaze preference, and was perseverating. Patient went for emergent CTH which revealed evolving L posterior cerebral artery infarct with new hemorrhage. Patient was subsequently intubated due to concern for deteriorating. Patient was then transferred to [facility].     Of note, patient was recently admitted to [place] on 4/12/21 for hypoxic/hypercapnic resp failure and hyperkalemia secondary to ESRD. While admitted pt was found to be confused, MRI obtained revealed large acute infarct of the left PCA. Pt was originally on Asprin which was changed to Plavix. Patient was discharged to rehab on Plavix.    Neurosurgery/NSICU team evaluated patient at bedside, patient unable to provide further history due to mental status/intubation.  (28 Apr 2021 17:33)  At 1030 pm 4/28/21- PEA arrest and CPR 30secs and subsequently extubated and reintubated     Overnight events  4/30/21 failed SBT                            4/30-5/1 /21- No events     Allergies    ertapenem (Urticaria)  Purell (Rash)    Intolerances        REVIEW OF SYSTEMS: [X ] Unable to Assess due to neurologic exam    Neuro: [ ] Headache [ ] Back pain [ ] Numbness [ ] Weakness [ ] Ataxia [ ] Dizziness [ ] Aphasia [ ] Dysarthria [ ] Visual disturbance  Resp: [ ] Shortness of breath/dyspnea, [ ] Orthopnea [ ] Cough  CV: [ ] Chest pain [ ] Palpitation [ ] Lightheadedness [ ] Syncope  Renal: [ ] Thirst [ ] Edema  GI: [ ] Nausea [ ] Emesis [ ] Abdominal pain [ ] Constipation [ ] Diarrhea  Hem: [ ] Hematemesis [ ] bright red blood per rectum  ID: [ ] Fever [ ] Chills [ ] Dysuria  ENT: [ ] Rhinorrhea    DEVICES:   [X ] Restraints [x ] ET tube [ ] central line [X ] R Fem  arterial line [x ] NGT/OGT     ICU Vital Signs Last 24 Hrs  T(C): 37.4 (01 May 2021 11:55), Max: 37.4 (01 May 2021 11:55)  T(F): 99.3 (01 May 2021 11:55), Max: 99.3 (01 May 2021 11:55)  HR: 79 (01 May 2021 12:01) (60 - 79)  BP: 101/51 (30 Apr 2021 16:30) (86/46 - 101/51)  BP(mean): 63 (30 Apr 2021 16:30) (57 - 63)  ABP: 138/48 (01 May 2021 12:01) (89/35 - 159/60)  ABP(mean): 81 (01 May 2021 12:01) (50 - 103)  RR: 25 (01 May 2021 12:01) (13 - 25)  SpO2: 100% (01 May 2021 12:01) (61% - 100%)      CAPILLARY BLOOD GLUCOSE      POCT Blood Glucose.: 172 mg/dL (01 May 2021 10:59)  POCT Blood Glucose.: 208 mg/dL (30 Apr 2021 23:49)  POCT Blood Glucose.: 118 mg/dL (30 Apr 2021 17:05)        Mode:AC  FiO2: 30  PEEP: 5  R- 15  TV- 450  PS: 5  MAP: 8      LABS:    05-01    137  |  99  |  37.0<H>  ----------------------------<  160<H>  4.2   |  25.0  |  5.10<H>    Ca    8.4<L>      01 May 2021 04:14  Phos  4.0     05-01  Mg     2.0     05-01    TPro  6.5<L>  /  Alb  2.9<L>  /  TBili  0.4  /  DBili  x   /  AST  20  /  ALT  7   /  AlkPhos  72  04-29 04-30    140  |  102  |  51.0<H>  ----------------------------<  159<H>  3.7   |  23.0  |  7.17<H>    Ca    8.5<L>      30 Apr 2021 04:55  Phos  5.1     04-30  Mg     2.3     04-30    TPro  6.5<L>  /  Alb  2.9<L>  /  TBili  0.4  /  DBili  x   /  AST  20  /  ALT  7   /  AlkPhos  72  04-29                          9.6    14.34 )-----------( 287      ( 01 May 2021 04:14 )             31.0     04-28    136  |  87<L>  |  60.0<H>  ----------------------------<  329<H>  4.2   |  18.0<L>  |  7.36<H>    Ca    9.2      28 Apr 2021 23:01  Phos  8.4     04-28  Mg     2.5     04-28    TPro  7.5  /  Alb  3.3  /  TBili  0.4  /  DBili  x   /  AST  22  /  ALT  8   /  AlkPhos  88  04-28                          9.9    16.22 )-----------( 358      ( 30 Apr 2021 04:55 )             30.8                             12.0   30.65 )-----------( 492      ( 28 Apr 2021 23:01 )      30 Apr 2021 07:01  -  01 May 2021 07:00  --------------------------------------------------------  IN:    Dexmedetomidine: 314.8 mL    Glucerna 1.5: 330 mL    Norepinephrine: 14.8 mL  Total IN: 659.6 mL    OUT:    Nasogastric/Oral tube (mL): 150 mL    Other (mL): 500 mL  Total OUT: 650 mL    Total NET: 9.6 mL      01 May 2021 07:01  -  01 May 2021 12:32  --------------------------------------------------------  IN:    Dexmedetomidine: 25.8 mL    Glucerna 1.5: 180 mL  Total IN: 205.8 mL    OUT:  Total OUT: 0 mL    Total NET: 205.8 mL                   39.6       29 Apr 2021 07:01  -  30 Apr 2021 07:00  --------------------------------------------------------  IN:    Dexmedetomidine: 505.5 mL    IV PiggyBack: 100 mL    Norepinephrine: 115 mL  Total IN: 720.5 mL    OUT:    Nasogastric/Oral tube (mL): 250 mL  Total OUT: 250 mL    Total NET: 470.5 mL      STROKE CORE MEASURES:   HGBA1C - 8.7  Lipid - LDL- 41 / Trig- 83  TSH and Free T4- 1.15        IMAGING/DATA: [X ] Reviewed  CT Head No Cont (04.29.21 @ 09:20) >  INTERPRETATION:  CT brain without contrast    History intracranial hemorrhage    Comparison yesterday    Left posterior temporal and occipital gyriform hyperattenuation is stable in degree since the prior exam with maturing encephalomalacia. There is no new hemorrhage or cortical edema, mass effect, hydrocephalus or midline shift..    IMPRESSION:  Maturing infarct and developing laminar necrosis as above    MEDICATIONS  (STANDING):  chlorhexidine 0.12% Liquid 15 milliLiter(s) Oral Mucosa every 12 hours  chlorhexidine 2% Cloths 1 Application(s) Topical daily  dexMEDEtomidine Infusion 0.1 MICROgram(s)/kG/Hr (2 mL/Hr) IV Continuous <Continuous>  dextrose 40% Gel 15 Gram(s) Oral once  dextrose 5%. 1000 milliLiter(s) (50 mL/Hr) IV Continuous <Continuous>  dextrose 5%. 1000 milliLiter(s) (100 mL/Hr) IV Continuous <Continuous>  dextrose 50% Injectable 25 Gram(s) IV Push once  dextrose 50% Injectable 12.5 Gram(s) IV Push once  dextrose 50% Injectable 25 Gram(s) IV Push once  glucagon  Injectable 1 milliGRAM(s) IntraMuscular once  insulin lispro (ADMELOG) corrective regimen sliding scale   SubCutaneous every 6 hours  midodrine 5 milliGRAM(s) Oral <User Schedule>  Nephro-debbie 1 Tablet(s) Oral daily  norepinephrine Infusion 0.05 MICROgram(s)/kG/Min (8.45 mL/Hr) IV Continuous <Continuous>  pantoprazole   Suspension 40 milliGRAM(s) Oral daily  valproic  acid Syrup 500 milliGRAM(s) Oral every 12 hours    MEDICATIONS  (PRN):  fentaNYL    Injectable 25 MICROGram(s) IV Push every 2 hours PRN Severe Pain (7 - 10)  hydrALAZINE Injectable 10 milliGRAM(s) IV Push every 2 hours PRN SBP >160  labetalol Injectable 10 milliGRAM(s) IV Push every 2 hours PRN Systolic blood pressure > 160 mmHg      EKG - Bifasicular Block with ST depression   Troponins - neg    ECHO-EF - 55%; Mild MR/TR ; Grade 1 Diastolic Dysfunction     EEG IMPRESSION/ 4/30- 5/1    Abnormal EEG study.  1. Multifocal potential epileptogenic foci, most prevalent in the left temporal and posterior quadrant regions.   2. Moderate to severe nonspecific diffuse or multifocal cerebral dysfunction.   3. No seizure seen.      Cultures  UA- neg  Blood Cultures- 4/28/21- No growth        EXAMINATION:  PHYSICAL EXAM:    Constitutional: No Acute Distress ; follows in April     Neurological:  On precedex at 0.2mcg/kg/min ;  open eyes to command ; Pupils 2mm /3mm m R/L reactive , Speaks [place]    Motor exam:          Upper extremity                         Delt     Bicep     Tricep    HG                                                 R        4+/5                                               L         4+/5          Lower extremity                        HF         KF        KE       DF         PF                                                  R        3/5 throughout                                               L        3/5 throughout                                                  Sensation:   [ X] decreased: - Hx of neuropathy       Pulmonary: Clear to Auscultation, No rales, No rhonchi, No wheezes     Cardiovascular: S1, S2, Regular rate and rhythm     Gastrointestinal: Soft, Non-tender, Non-distended     Extremities: No calf tenderness

## 2021-05-01 NOTE — PROGRESS NOTE ADULT - SUBJECTIVE AND OBJECTIVE BOX
HPI/OVERNIGHT EVENTS: Patient currently remains intubated and under acute care of NSICU service. Underwent HD 4/30 without issue via R femoral Shiley.    MEDICATIONS  (STANDING):  chlorhexidine 0.12% Liquid 15 milliLiter(s) Oral Mucosa every 12 hours  chlorhexidine 2% Cloths 1 Application(s) Topical daily  dexMEDEtomidine Infusion 0.1 MICROgram(s)/kG/Hr (2 mL/Hr) IV Continuous <Continuous>  dextrose 40% Gel 15 Gram(s) Oral once  dextrose 5%. 1000 milliLiter(s) (50 mL/Hr) IV Continuous <Continuous>  dextrose 5%. 1000 milliLiter(s) (100 mL/Hr) IV Continuous <Continuous>  dextrose 50% Injectable 25 Gram(s) IV Push once  dextrose 50% Injectable 12.5 Gram(s) IV Push once  dextrose 50% Injectable 25 Gram(s) IV Push once  glucagon  Injectable 1 milliGRAM(s) IntraMuscular once  insulin lispro (ADMELOG) corrective regimen sliding scale   SubCutaneous every 6 hours  midodrine 5 milliGRAM(s) Oral <User Schedule>  Nephro-debbie 1 Tablet(s) Oral daily  norepinephrine Infusion 0.05 MICROgram(s)/kG/Min (8.45 mL/Hr) IV Continuous <Continuous>  pantoprazole   Suspension 40 milliGRAM(s) Oral daily  valproic  acid Syrup 500 milliGRAM(s) Oral every 12 hours    MEDICATIONS  (PRN):  fentaNYL    Injectable 25 MICROGram(s) IV Push every 2 hours PRN Severe Pain (7 - 10)  hydrALAZINE Injectable 10 milliGRAM(s) IV Push every 2 hours PRN SBP >160  labetalol Injectable 10 milliGRAM(s) IV Push every 2 hours PRN Systolic blood pressure > 160 mmHg      Vital Signs Last 24 Hrs  T(C): 36.9 (01 May 2021 04:06), Max: 37.1 (30 Apr 2021 11:36)  T(F): 98.4 (01 May 2021 04:06), Max: 98.8 (30 Apr 2021 11:36)  HR: 65 (01 May 2021 06:30) (60 - 86)  BP: 101/51 (30 Apr 2021 16:30) (86/46 - 177/122)  BP(mean): 63 (30 Apr 2021 16:30) (57 - 135)  RR: 14 (01 May 2021 06:30) (13 - 25)  SpO2: 100% (01 May 2021 06:30) (61% - 100%)    General: Sedated  Neuro: Sedated  HEENT: ETT in place. NGT in place  Cardio: RRR  Resp: Non labored breathing on RA  GI/Abd: Soft, NT/ND, no rebound/guarding, no masses palpated. R groin Shiley catheter, site c/d/i  Vascular: Left radiocephalic AVF with no palpable thrill. Palpable pulse proximally.   Pelvis: stable    I&O's Detail    30 Apr 2021 07:01  -  01 May 2021 07:00  --------------------------------------------------------  IN:    Dexmedetomidine: 314.8 mL    Glucerna 1.5: 300 mL    Norepinephrine: 14.8 mL  Total IN: 629.6 mL    OUT:    Nasogastric/Oral tube (mL): 150 mL    Other (mL): 500 mL  Total OUT: 650 mL    Total NET: -20.4 mL          LABS:                        9.6    14.34 )-----------( 287      ( 01 May 2021 04:14 )             31.0     05-01    137  |  99  |  37.0<H>  ----------------------------<  160<H>  4.2   |  25.0  |  5.10<H>    Ca    8.4<L>      01 May 2021 04:14  Phos  4.0     05-01  Mg     2.0     05-01    TPro  6.5<L>  /  Alb  2.9<L>  /  TBili  0.4  /  DBili  x   /  AST  20  /  ALT  7   /  AlkPhos  72  04-29

## 2021-05-01 NOTE — PROGRESS NOTE ADULT - ASSESSMENT
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  - GCS E-2 V-1T M-5= 8    NEURO:  - Q2 neuro checks  -  CTH scan in am   - Valproic acid 500mg q 12  - EEG - No seizures D/C   - OFF Plavix + ASA for prior stroke due hemorrhagic conversion  -  precedex wean and trial of Haldol 0.5 mg IV x1   - Activity: [] mobilize as tolerated [x] Bedrest for now [] PT [] OT [] PMNR    PULM:  - 15/450/40/5---> CPAP 5 PS 10 - pre post ABG for T piece trial  - SpO2 goal > 92%    CV: CAD/ Hx of Bifascicular Block ; Hx of PAF   - SBP goal 100-150  - Tolerate SBP > 100 or MAP > 65   - Lipid LDL 41 from 4/14, no need for statin at this point  - TTE - EF 45-50%;  ; Will Need LIBIA  - Midodrine - 5mg q 12- weaned off neosynephrine     RENAL: ESRD - dialysis - M/W/F since 2018   dialysis last 4/30/21  Nephro VIT  - Fluids: NS @ 50 for now  - vascular to access L forearm fistula; --> evaluated by vascular and awaiting fistulagram   Ultrasound- thrombosis of Cephalic outflow vein ; fistula is patent with low velocities  -     GI:  - Diet:glycerna    Feeds held for possible extubation   - GI prophylaxis  [x] PPI   - Bowel regimen [] colace [x] senna once PO access obtained [x] other: miralax   - Last BM 4/29/21   - LFT - nl     ENDO:   - Goal euglycemia (-180)  - Q6 finger sticks   A1C 8.7% 4/13/21, TSH 1.15 4/14/21    HEME/ONC:  - VTE prophylaxis: [x] SCDs [] chemoprophylaxis [x] chemoprophylaxis - heparinn 5 K q 12 if Ct in am stable      ID:  - F/u blood cultures from [place]- neg to date   - Monitor for fever,  - Leucocytosis/ Prior warming blanket       SOCIAL/FAMILY:  [x] awaiting [] updated at bedside [] family meeting    CODE STATUS:  [x] Full Code [] DNR [] DNI [] Palliative/Comfort Care    DISPOSITION:  [x] ICU     [x] Patient is at high risk of neurologic deterioration/death due to: seizure, re-bleed, respiratory failure    Time spent: 40 critical care minutes

## 2021-05-01 NOTE — EEG REPORT - NS EEG TEXT BOX
[facility]   COMPREHENSIVE EPILEPSY CENTER   REPORT OF LONG-TERM VIDEO EEG     [facility]: [address], [place] [address], Ph#: [phone]  [facility]: [address], [place] [address], Ph#: [phone]  [facility]: [address] [place] [address], Ph#: [phone]    Patient Name: IRENE TAYLOR  Age and : 71y (46)  MRN #: [id number]  Location: [place] [id number]   Referring Physician: Radha Moore    Start Time/Date: 08:00 on 21  End Time/Date: 08:00 on 21  Duration: 24hr    _____________________________________________________________  STUDY INFORMATION    EEG Recording Technique:  The patient underwent continuous Video-EEG monitoring, using Telemetry System hardware on the XLTek Digital System. EEG and video data were stored on a computer hard drive with important events saved in digital archive files. The material was reviewed by a physician (electroencephalographer / epileptologist) on a daily basis. Ken[place] and seizure detection algorithms were utilized and reviewed. An EEG Technician attended to the patient, and was available throughout daytime work hours.  The epilepsy center neurologist was available in person or on call 24-hours per day.    EEG Placement and Labeling of Electrodes:  The EEG was performed utilizing 20 channel referential EEG connections (coronal over temporal over parasagittal montage) using all standard 10-20 electrode placements with EKG, with additional electrodes placed in the inferior temporal region using the modified 10-10 montage electrode placements for elective admissions, or if deemed necessary. Recording was at a sampling rate of 256 samples per second per channel. Time synchronized digital video recording was done simultaneously with EEG recording. A low light infrared camera was used for low light recording.     _____________________________________________________________  HISTORY    Patient is a 71y old  Female who presents with a chief complaint of CVA with hemorrhagic transformation (2021 07:14)      PERTINENT MEDICATION:  valproate sodium IVPB 500 milliGRAM(s) IV Intermittent every 12 hours    _____________________________________________________________  STUDY INTERPRETATION    Findings: The background was continuous, spontaneously variable and reactive. No posterior dominant rhythm seen.    Background Slowing:  Diffuse theta and polymorphic delta slowing.    Focal Slowing:   None were present.    Sleep Background:  Stage II sleep transients were not recorded.    Other Non-Epileptiform Findings:  None were present.    Interictal Epileptiform Activity:   During maximal wakefulness, frequent multifocal sharp wave discharges in the left frontal (Fp1), left temporal (shifting maximum), left posterior quadrant (max O1/P7), right frontocentral (max F4/C4) and right centroparietal (C4/P4) regions.     Events:  Clinical events: None recorded.  Seizures: None recorded.    Activation Procedures:   Hyperventilation was not performed.    Photic stimulation was not performed.     Artifacts:  Intermittent myogenic and movement artifacts were noted.    ECG:  The heart rate on single channel ECG was predominantly between 60-70 BPM.    _____________________________________________________________  EEG SUMMARY/CLASSIFICATION    Abnormal EEG in an altered patient.  - During maximal wakefulness, frequent multifocal sharp wave discharges in the left frontal (Fp1), left temporal (shifting maximum), left posterior quadrant (max O1/P7), right frontocentral (max F4/C4) and right centroparietal (C4/P4) regions.   - Moderate to severe generalized slowing.    _____________________________________________________________  EEG IMPRESSION/CLINICAL CORRELATE    Abnormal EEG study.  1. Multifocal potential epileptogenic foci, most prevalent in the left temporal and posterior quadrant regions.   2. Moderate to severe nonspecific diffuse or multifocal cerebral dysfunction.   3. No seizure seen.    _____________________________________________________________    Pamela Swan MD  Attending Physician, [facility] Epilepsy [place]     [facility]   COMPREHENSIVE EPILEPSY CENTER   REPORT OF LONG-TERM VIDEO EEG     [facility]: [address], [place] [address], Ph#: [phone]  [facility]: [address], [place] [address], Ph#: [phone]  [facility]: [address] [place] [address], Ph#: [phone]    Patient Name: IRENE TAYLOR  Age and : 71y (46)  MRN #: [id number]  Location: [place] [id number]   Referring Physician: Radha Moore    Start Time/Date: 08:00 on 21  End Time/Date: 13:09 on 21  Duration: 29hr 09m    _____________________________________________________________  STUDY INFORMATION    EEG Recording Technique:  The patient underwent continuous Video-EEG monitoring, using Telemetry System hardware on the XLTek Digital System. EEG and video data were stored on a computer hard drive with important events saved in digital archive files. The material was reviewed by a physician (electroencephalographer / epileptologist) on a daily basis. Kenroy and seizure detection algorithms were utilized and reviewed. An EEG Technician attended to the patient, and was available throughout daytime work hours.  The epilepsy center neurologist was available in person or on call 24-hours per day.    EEG Placement and Labeling of Electrodes:  The EEG was performed utilizing 20 channel referential EEG connections (coronal over temporal over parasagittal montage) using all standard 10-20 electrode placements with EKG, with additional electrodes placed in the inferior temporal region using the modified 10-10 montage electrode placements for elective admissions, or if deemed necessary. Recording was at a sampling rate of 256 samples per second per channel. Time synchronized digital video recording was done simultaneously with EEG recording. A low light infrared camera was used for low light recording.     _____________________________________________________________  HISTORY    Patient is a 71y old  Female who presents with a chief complaint of CVA with hemorrhagic transformation (2021 07:14)      PERTINENT MEDICATION:  valproate sodium IVPB 500 milliGRAM(s) IV Intermittent every 12 hours    _____________________________________________________________  STUDY INTERPRETATION    Findings: The background was continuous, spontaneously variable and reactive. No posterior dominant rhythm seen.    Background Slowing:  Diffuse theta and polymorphic delta slowing.    Focal Slowing:   None were present.    Sleep Background:  Stage II sleep transients were not recorded.    Other Non-Epileptiform Findings:  None were present.    Interictal Epileptiform Activity:   During maximal wakefulness, frequent multifocal sharp wave discharges in the left frontal (Fp1), left temporal (shifting maximum), left posterior quadrant (max O1/P7), right frontocentral (max F4/C4) and right centroparietal (C4/P4) regions.     Events:  Clinical events: None recorded.  Seizures: None recorded.    Activation Procedures:   Hyperventilation was not performed.    Photic stimulation was not performed.     Artifacts:  Intermittent myogenic and movement artifacts were noted.    ECG:  The heart rate on single channel ECG was predominantly between 60-70 BPM.    _____________________________________________________________  EEG SUMMARY/CLASSIFICATION    Abnormal EEG in an altered patient.  - During maximal wakefulness, frequent multifocal sharp wave discharges in the left frontal (Fp1), left temporal (shifting maximum), left posterior quadrant (max O1/P7), right frontocentral (max F4/C4) and right centroparietal (C4/P4) regions.   - Moderate to severe generalized slowing.    _____________________________________________________________  EEG IMPRESSION/CLINICAL CORRELATE    Abnormal EEG study.  1. Multifocal potential epileptogenic foci, most prevalent in the left temporal and posterior quadrant regions.   2. Moderate to severe nonspecific diffuse or multifocal cerebral dysfunction.   3. No seizure seen.    _____________________________________________________________    Pamela Swan MD  Attending Physician, [facility] Epilepsy [place]

## 2021-05-02 LAB
ANION GAP SERPL CALC-SCNC: 20 MMOL/L — HIGH (ref 5–17)
BUN SERPL-MCNC: 45 MG/DL — HIGH (ref 8–20)
CALCIUM SERPL-MCNC: 8.8 MG/DL — SIGNIFICANT CHANGE UP (ref 8.6–10.2)
CHLORIDE SERPL-SCNC: 100 MMOL/L — SIGNIFICANT CHANGE UP (ref 98–107)
CO2 SERPL-SCNC: 20 MMOL/L — LOW (ref 22–29)
CREAT SERPL-MCNC: 7.29 MG/DL — HIGH (ref 0.5–1.3)
GLUCOSE BLDC GLUCOMTR-MCNC: 151 MG/DL — HIGH (ref 70–99)
GLUCOSE BLDC GLUCOMTR-MCNC: 175 MG/DL — HIGH (ref 70–99)
GLUCOSE SERPL-MCNC: 139 MG/DL — HIGH (ref 70–99)
HCT VFR BLD CALC: 31.1 % — LOW (ref 34.5–45)
HGB BLD-MCNC: 9.4 G/DL — LOW (ref 11.5–15.5)
MAGNESIUM SERPL-MCNC: 2.2 MG/DL — SIGNIFICANT CHANGE UP (ref 1.6–2.6)
MCHC RBC-ENTMCNC: 28.7 PG — SIGNIFICANT CHANGE UP (ref 27–34)
MCHC RBC-ENTMCNC: 30.2 GM/DL — LOW (ref 32–36)
MCV RBC AUTO: 94.8 FL — SIGNIFICANT CHANGE UP (ref 80–100)
PHOSPHATE SERPL-MCNC: 5.2 MG/DL — HIGH (ref 2.4–4.7)
PLATELET # BLD AUTO: 294 K/UL — SIGNIFICANT CHANGE UP (ref 150–400)
POTASSIUM SERPL-MCNC: 4.4 MMOL/L — SIGNIFICANT CHANGE UP (ref 3.5–5.3)
POTASSIUM SERPL-SCNC: 4.4 MMOL/L — SIGNIFICANT CHANGE UP (ref 3.5–5.3)
RBC # BLD: 3.28 M/UL — LOW (ref 3.8–5.2)
RBC # FLD: 14.4 % — SIGNIFICANT CHANGE UP (ref 10.3–14.5)
SODIUM SERPL-SCNC: 139 MMOL/L — SIGNIFICANT CHANGE UP (ref 135–145)
WBC # BLD: 15.94 K/UL — HIGH (ref 3.8–10.5)
WBC # FLD AUTO: 15.94 K/UL — HIGH (ref 3.8–10.5)

## 2021-05-02 PROCEDURE — 99291 CRITICAL CARE FIRST HOUR: CPT

## 2021-05-02 PROCEDURE — 70450 CT HEAD/BRAIN W/O DYE: CPT | Mod: 26

## 2021-05-02 PROCEDURE — 99233 SBSQ HOSP IP/OBS HIGH 50: CPT

## 2021-05-02 RX ORDER — DILTIAZEM HCL 120 MG
15 CAPSULE, EXT RELEASE 24 HR ORAL ONCE
Refills: 0 | Status: COMPLETED | OUTPATIENT
Start: 2021-05-02 | End: 2021-05-02

## 2021-05-02 RX ORDER — SODIUM CHLORIDE 9 MG/ML
1000 INJECTION, SOLUTION INTRAVENOUS
Refills: 0 | Status: DISCONTINUED | OUTPATIENT
Start: 2021-05-02 | End: 2021-05-03

## 2021-05-02 RX ORDER — DILTIAZEM HCL 120 MG
10 CAPSULE, EXT RELEASE 24 HR ORAL ONCE
Refills: 0 | Status: COMPLETED | OUTPATIENT
Start: 2021-05-02 | End: 2021-05-02

## 2021-05-02 RX ORDER — DEXMEDETOMIDINE HYDROCHLORIDE IN 0.9% SODIUM CHLORIDE 4 UG/ML
0.1 INJECTION INTRAVENOUS
Qty: 200 | Refills: 0 | Status: DISCONTINUED | OUTPATIENT
Start: 2021-05-02 | End: 2021-05-04

## 2021-05-02 RX ORDER — DILTIAZEM HCL 120 MG
5 CAPSULE, EXT RELEASE 24 HR ORAL EVERY 4 HOURS
Refills: 0 | Status: DISCONTINUED | OUTPATIENT
Start: 2021-05-02 | End: 2021-05-02

## 2021-05-02 RX ADMIN — Medication 2: at 17:49

## 2021-05-02 RX ADMIN — Medication 5 MILLIGRAM(S): at 02:12

## 2021-05-02 RX ADMIN — Medication 27.5 MILLIGRAM(S): at 05:21

## 2021-05-02 RX ADMIN — SODIUM CHLORIDE 50 MILLILITER(S): 9 INJECTION, SOLUTION INTRAVENOUS at 12:53

## 2021-05-02 RX ADMIN — Medication 5 MILLIGRAM(S): at 04:16

## 2021-05-02 RX ADMIN — Medication 27.5 MILLIGRAM(S): at 17:30

## 2021-05-02 RX ADMIN — Medication 10 MILLIGRAM(S): at 05:31

## 2021-05-02 RX ADMIN — DEXMEDETOMIDINE HYDROCHLORIDE IN 0.9% SODIUM CHLORIDE 2.25 MICROGRAM(S)/KG/HR: 4 INJECTION INTRAVENOUS at 09:03

## 2021-05-02 RX ADMIN — CHLORHEXIDINE GLUCONATE 1 APPLICATION(S): 213 SOLUTION TOPICAL at 09:08

## 2021-05-02 RX ADMIN — PANTOPRAZOLE SODIUM 40 MILLIGRAM(S): 20 TABLET, DELAYED RELEASE ORAL at 09:08

## 2021-05-02 RX ADMIN — Medication 15 MILLIGRAM(S): at 08:01

## 2021-05-02 RX ADMIN — Medication 2: at 12:53

## 2021-05-02 RX ADMIN — DEXMEDETOMIDINE HYDROCHLORIDE IN 0.9% SODIUM CHLORIDE 2.25 MICROGRAM(S)/KG/HR: 4 INJECTION INTRAVENOUS at 17:49

## 2021-05-02 RX ADMIN — Medication 8.45 MICROGRAM(S)/KG/MIN: at 12:53

## 2021-05-02 NOTE — PROGRESS NOTE ADULT - ASSESSMENT
71yF PMH HTN, CAD s/p MI with h/o PCI 2007, DM2 with associated diabetic neuropathy, ESRD on HD MWF 5/2018, chronic pancreatitis, paroxysmal afib 2018, recent admission for pneumonia with hospital course complicated with afib, L PCA CVA discharged on Plavix (not ACT as patient high risk for bleed), presented to [facility] ~ 11 AM, was at hemodialysis, received 30 minutes of HD, vomited x 1 and complained of generalized weakness, brought to ED, found with WBC 21k s/p Zosyn/Vancomycin, developed seizure like activity, CTH done found with hemorrhagic conversion in L PCA  - GCS E-2 V-1T M-5= 8    NEURO:  - Q2 neuro checks  - Check W/U CTA at OSH  -  CTH scan today- Start ASA  - Once stable heme on CT  will address starting DOAC ( CHADS VASc > 2 + afib)  - Valproic acid 500mg q 12  - EEG - No seizures D/C   - OFF Plavix + ASA for prior stroke due hemorrhagic conversion  -  precedex wean and trial of Haldol 0.5 mg IV x1   - Activity: [] mobilize as tolerated [x] Bedrest for now [] PT [] OT [] PMNR    PULM:  - SpO2 goal > 92%    CV: CAD/ Hx of Bifascicular Block ; Hx of PAF   - SBP goal 100-150  - Tolerate SBP > 100 or MAP > 65   - Lipid LDL 41 from 4/14, no need for statin at this point  - TTE - EF 45-50%;  ; Will Need LIBIA  - Midodrine - 5mg q 12- weaned off neosynephrine     RENAL: ESRD - dialysis - M/W/F since 2018   dialysis last 4/30/21  Nephro VIT  - Fluids: NS @ 50 for now  - vascular to access L forearm fistula; --> evaluated by vascular and awaiting fistulagram   Ultrasound- thrombosis of Cephalic outflow vein ; fistula is patent with low velocities  -     GI:  - Diet:glycerna  - Speech and swallow eval   - GI prophylaxis  [x] PPI   - Bowel regimen [] colace [x] senna once PO access obtained [x] other: miralax   - Last BM 5/1 /21   - LFT - nl     ENDO:   - Goal euglycemia (-180)  - Q6 finger sticks   A1C 8.7% 4/13/21, TSH 1.15 4/14/21    HEME/ONC:  - VTE prophylaxis: [x] SCDs [] chemoprophylaxis [x] chemoprophylaxis - heparin 5 K q 12 if Ct  stable      ID:  - F/u blood cultures from [place]- neg to date   - Monitor for fever,  - Leucocytosis/ Prior warming blanket       SOCIAL/FAMILY:  [x] awaiting [] updated at bedside [] family meeting    CODE STATUS:  [x] Full Code [] DNR [] DNI [] Palliative/Comfort Care    DISPOSITION:  [x] ICU     [x] Patient is at high risk of neurologic deterioration/death due to: seizure, re-bleed, respiratory failure    Time spent: 40 critical care minutes   71yF PMH HTN, CAD s/p MI with h/o PCI 2007, DM2 with associated diabetic neuropathy, ESRD on HD MWF 5/2018, chronic pancreatitis, paroxysmal afib 2018, recent admission for pneumonia with hospital course complicated with afib, L PCA CVA discharged on Plavix (not ACT as patient high risk for bleed), presented to [facility] ~ 11 AM, was at hemodialysis, received 30 minutes of HD, vomited x 1 and complained of generalized weakness, brought to ED, found with WBC 21k s/p Zosyn/Vancomycin, developed seizure like activity, CTH done found with hemorrhagic conversion in L PCA  - GCS E-2 V-1T M-5= 8    NEURO:  - Q2 neuro checks  - Check W/U CTA at [place]  -  CTH scan today- Start ASA  - Once stable heme on CT  will address starting DOAC ( CHADS VASc > 2 + afib)  - Valproic acid 500mg q 12  - EEG - No seizures D/C   - OFF Plavix + ASA for prior stroke due hemorrhagic conversion- F/U CT shows increased heme in stroke bed therfore hold Antiplt agents  -  precedex wean / Haldol 1mg and repeat in 5 min if no effect   - Activity: [] mobilize as tolerated [x] Bedrest for now [] PT [] OT [] PMNR    PULM:  - SpO2 goal > 92%    CV: CAD/ Hx of Bifascicular Block ; Hx of PAF   - SBP goal 100-150  - Tolerate SBP > 100 or MAP > 65   - Lipid LDL 41 from 4/14, no need for statin at this point  - TTE - EF 45-50%;  ;  - Midodrine - 5mg q 12- weaned off neosynephrine     RENAL: ESRD - dialysis - M/W/F since 2018   dialysis last 4/30/21  Nephro VIT  - LR 50 cc/hr   - vascular to access L forearm fistula; --> evaluated by vascular and awaiting fistulagram   -Ultrasound- thrombosis of Cephalic outflow vein ; fistula is patent with low velocities      GI:  Failed dysphagia - retry with daughter at bedside  - Diet: glycerna  - Speech and swallow eval   - GI prophylaxis  [x] PPI   - Bowel regimen [] colace [x] senna once PO access obtained [x] other: miralax   - Last BM 4/30/21  - LFT - nl     ENDO:   - Goal euglycemia (-180)  - Q6 finger sticks   A1C 8.7% 4/13/21, TSH 1.15 4/14/21    HEME/ONC:  - VTE prophylaxis: [x] SCDs [] chemoprophylaxis [x] chemoprophylaxis - hold DVT prophylaxsis with CT as above      ID:  - F/u blood cultures from [place]- neg to date   - Monitor for fever,  - Leucocytosis- improved / Prior warming blanket       SOCIAL/FAMILY:  [x] awaiting [] updated at bedside [] family meeting    CODE STATUS:  [x] Full Code [] DNR [] DNI [] Palliative/Comfort Care    DISPOSITION:  [x] ICU     [x] Patient is at high risk of neurologic deterioration/death due to: seizure, re-bleed, respiratory failure    Time spent: 40 critical care minutes

## 2021-05-02 NOTE — PROGRESS NOTE ADULT - ASSESSMENT
HD On Monday,     Access : Longer Catheter ( 19.5 cms., ) - Non Tunneled , ( RT - Femoral )    D/W NS ( Dr. Kumar )

## 2021-05-02 NOTE — PROGRESS NOTE ADULT - SUBJECTIVE AND OBJECTIVE BOX
SUMMARY:HPI:  70 y/o female with PMHx CAD s/p stents '07, HTN, MI, PAF, liver abscess, s/p biliary stent with removal (11/2018; 7/2/19), chronic pancreatitis, DM2 on insulin, diabetic neuropathy, ESRD (5/2018) on HD (M/W/F since 5/18) presents to [facility] as a transfer from [place] after she went to HD today with and had  an episode of syncope and change in mental status. Patient had HD around 11am4/28/21  and 30 mins into HD patient vomited and had generalized weakness. HD was stopped, patient was awake and alert, and sepsis work up was done, WBC count 21K. Patient received 1st dose of vancomycin and RN reported she became unresponsive, L gaze preference, and was perseverating. Patient went for emergent CTH which revealed evolving L posterior cerebral artery infarct with new hemorrhage. Patient was subsequently intubated due to concern for deteriorating. Patient was then transferred to [facility].     Of note, patient was recently admitted to [place] on 4/12/21 for hypoxic/hypercapnic resp failure and hyperkalemia secondary to ESRD. While admitted pt was found to be confused, MRI obtained revealed large acute infarct of the left PCA. Pt was originally on Asprin which was changed to Plavix. Patient was discharged to rehab on Plavix.    Neurosurgery/NSICU team evaluated patient at bedside, patient unable to provide further history due to mental status/intubation.  (28 Apr 2021 17:33)  At 1030 pm 4/28/21- PEA arrest and CPR 30secs and subsequently extubated and reintubated     Overnight events  - none    Extubated 5/1/21     Allergies    ertapenem (Urticaria)  Purell (Rash)    Intolerances        REVIEW OF SYSTEMS: [X ] Unable to Assess due to neurologic exam    Neuro: [ ] Headache [ ] Back pain [ ] Numbness [ ] Weakness [ ] Ataxia [ ] Dizziness [ ] Aphasia [ ] Dysarthria [ ] Visual disturbance  Resp: [ ] Shortness of breath/dyspnea, [ ] Orthopnea [ ] Cough  CV: [ ] Chest pain [ ] Palpitation [ ] Lightheadedness [ ] Syncope  Renal: [ ] Thirst [ ] Edema  GI: [ ] Nausea [ ] Emesis [ ] Abdominal pain [ ] Constipation [ ] Diarrhea  Hem: [ ] Hematemesis [ ] bright red blood per rectum  ID: [ ] Fever [ ] Chills [ ] Dysuria  ENT: [ ] Rhinorrhea    DEVICES:   [X ] Restraints  [ X] L fem Shiley ( Thrombosis in IJ [X ] R Fem  arterial line [x ] NGT/OGT  Partially thrombosed L forearm fistula           CAPILLARY BLOOD GLUCOSE      POCT Blood Glucose.: 170 mg/dL (01 May 2021 17:37)  POCT Blood Glucose.: 172 mg/dL (01 May 2021 10:59)    MEDICATIONS  (STANDING):  chlorhexidine 2% Cloths 1 Application(s) Topical daily  dexMEDEtomidine Infusion 0.1 MICROgram(s)/kG/Hr (2.25 mL/Hr) IV Continuous <Continuous>  dextrose 40% Gel 15 Gram(s) Oral once  dextrose 5%. 1000 milliLiter(s) (50 mL/Hr) IV Continuous <Continuous>  dextrose 5%. 1000 milliLiter(s) (100 mL/Hr) IV Continuous <Continuous>  dextrose 50% Injectable 25 Gram(s) IV Push once  dextrose 50% Injectable 12.5 Gram(s) IV Push once  dextrose 50% Injectable 25 Gram(s) IV Push once  glucagon  Injectable 1 milliGRAM(s) IntraMuscular once  insulin lispro (ADMELOG) corrective regimen sliding scale   SubCutaneous every 6 hours  midodrine 5 milliGRAM(s) Oral <User Schedule>  Nephro-debbie 1 Tablet(s) Oral daily  norepinephrine Infusion 0.05 MICROgram(s)/kG/Min (8.45 mL/Hr) IV Continuous <Continuous>  pantoprazole  Injectable 40 milliGRAM(s) IV Push daily  senna 1 Tablet(s) Oral daily  valproate sodium IVPB 500 milliGRAM(s) IV Intermittent every 12 hours    MEDICATIONS  (PRN):  diltiazem Injectable 5 milliGRAM(s) IV Push every 4 hours PRN HR>120  hydrALAZINE Injectable 10 milliGRAM(s) IV Push every 2 hours PRN SBP >160  labetalol Injectable 10 milliGRAM(s) IV Push every 2 hours PRN Systolic blood pressure > 160 mmHg  polyethylene glycol 3350 17 Gram(s) Oral daily PRN Constipation    01 May 2021 07:01  -  02 May 2021 07:00  --------------------------------------------------------  IN:    Dexmedetomidine: 57.5 mL    Glucerna 1.5: 120 mL    IV PiggyBack: 100 mL    Norepinephrine: 48.4 mL  Total IN: 325.9 mL    OUT:  Total OUT: 0 mL    Total NET: 325.9 mL          02 May 2021 07:01  -  02 May 2021 09:17  --------------------------------------------------------  IN:    Dexmedetomidine: 9 mL    Norepinephrine: 13.4 mL  Total IN: 22.4 mL    OUT:  Total OUT: 0 mL    Total NET: 22.4 mL      LABS:    CAPILLARY BLOOD GLUCOSE  POCT Blood Glucose.: 170 mg/dL (01 May 2021 17:37)  POCT Blood Glucose.: 172 mg/dL (01 May 2021 10:59)      05-02    139  |  100  |  45.0<H>  ----------------------------<  139<H>  4.4   |  20.0<L>  |  7.29<H>    Ca    8.8      02 May 2021 04:24  Phos  5.2     05-02  Mg     2.2     05-02 05-01    137  |  99  |  37.0<H>  ----------------------------<  160<H>  4.2   |  25.0  |  5.10<H>    Ca    8.4<L>      01 May 2021 04:14  Phos  4.0     05-01  Mg     2.0     05-01    TPro  6.5<L>  /  Alb  2.9<L>  /  TBili  0.4  /  DBili  x   /  AST  20  /  ALT  7   /  AlkPhos  72  04-29 04-30    140  |  102  |  51.0<H>  ----------------------------<  159<H>  3.7   |  23.0  |  7.17<H>    Ca    8.5<L>      30 Apr 2021 04:55  Phos  5.1     04-30  Mg     2.3     04-30    TPro  6.5<L>  /  Alb  2.9<L>  /  TBili  0.4  /  DBili  x   /  AST  20  /  ALT  7   /  AlkPhos  72  04-29                               9.4    15.94 )-----------( 294      ( 02 May 2021 04:24 )             31.1                             12.0   30.65 )-----------( 492      ( 28 Apr 2021 23:01 )        EKG - Bifasicular Block with ST depression   Troponins - neg    ECHO-EF - 55%; Mild MR/TR ; Grade 1 Diastolic Dysfunction     EEG IMPRESSION/ 4/30- 5/1    Abnormal EEG study.  1. Multifocal potential epileptogenic foci, most prevalent in the left temporal and posterior quadrant regions.   2. Moderate to severe nonspecific diffuse or multifocal cerebral dysfunction.   3. No seizure seen.      STROKE CORE MEASURES:   HGBA1C - 8.7  Lipid - LDL- 41 / Trig- 83  TSH and Free T4- 1.15        IMAGING/DATA: [X ] Reviewed  CT Head No Cont (04.29.21 @ 09:20) >  INTERPRETATION:  CT brain without contrast    History intracranial hemorrhage    Comparison yesterday    Left posterior temporal and occipital gyriform hyperattenuation is stable in degree since the prior exam with maturing encephalomalacia. There is no new hemorrhage or cortical edema, mass effect, hydrocephalus or midline shift..    IMPRESSION:  Maturing infarct and developing laminar necrosis as above      Cultures  UA- neg  Blood Cultures- 4/28/21- No growth        EXAMINATION:  PHYSICAL EXAM:    Constitutional: No Acute Distress ; follows in April     Neurological:  On precedex at 0.2mcg/kg/min ;  open eyes to command ; Pupils 2mm /3mm m R/L reactive , Speaks [place]    Motor exam:          Upper extremity                         Delt     Bicep     Tricep    HG                                                 R        4+/5                                               L         4+/5          Lower extremity                        HF         KF        KE       DF         PF                                                  R        3/5 throughout                                               L        3/5 throughout                                                  Sensation:   [ X] decreased: - Hx of neuropathy       Pulmonary: Clear to Auscultation, No rales, No rhonchi, No wheezes     Cardiovascular: S1, S2, Regular rate and rhythm     Gastrointestinal: Soft, Non-tender, Non-distended     Extremities: No calf tenderness        SUMMARY:HPI:  70 y/o female with PMHx CAD s/p stents '07, HTN, MI, PAF, liver abscess, s/p biliary stent with removal (11/2018; 7/2/19), chronic pancreatitis, DM2 on insulin, diabetic neuropathy, ESRD (5/2018) on HD (M/W/F since 5/18) presents to [facility] as a transfer from [place] after she went to HD today with and had  an episode of syncope and change in mental status. Patient had HD around 11am4/28/21  and 30 mins into HD patient vomited and had generalized weakness. HD was stopped, patient was awake and alert, and sepsis work up was done, WBC count 21K. Patient received 1st dose of vancomycin and RN reported she became unresponsive, L gaze preference, and was perseverating. Patient went for emergent CTH which revealed evolving L posterior cerebral artery infarct with new hemorrhage. Patient was subsequently intubated due to concern for deteriorating. Patient was then transferred to [facility].     Of note, patient was recently admitted to [place] on 4/12/21 for hypoxic/hypercapnic resp failure and hyperkalemia secondary to ESRD. While admitted pt was found to be confused, MRI obtained revealed large acute infarct of the left PCA. Pt was originally on Asprin which was changed to Plavix. Patient was discharged to rehab on Plavix.    Neurosurgery/NSICU team evaluated patient at bedside, patient unable to provide further history due to mental status/intubation.  (28 Apr 2021 17:33)  At 1030 pm 4/28/21- PEA arrest and CPR 30secs and subsequently extubated and reintubated     Overnight events  - none    Extubated 5/1/21     Allergies    ertapenem (Urticaria)  Purell (Rash)    Intolerances        REVIEW OF SYSTEMS: [X ] Unable to Assess due to neurologic exam    Neuro: [ ] Headache [ ] Back pain [ ] Numbness [ ] Weakness [ ] Ataxia [ ] Dizziness [ ] Aphasia [ ] Dysarthria [ ] Visual disturbance  Resp: [ ] Shortness of breath/dyspnea, [ ] Orthopnea [ ] Cough  CV: [ ] Chest pain [ ] Palpitation [ ] Lightheadedness [ ] Syncope  Renal: [ ] Thirst [ ] Edema  GI: [ ] Nausea [ ] Emesis [ ] Abdominal pain [ ] Constipation [ ] Diarrhea  Hem: [ ] Hematemesis [ ] bright red blood per rectum  ID: [ ] Fever [ ] Chills [ ] Dysuria  ENT: [ ] Rhinorrhea    DEVICES:   [X ] Restraints  [ X] L fem Shiley ( Thrombosis in IJ [X ] R Fem  arterial line [x ] NGT/OGT  Partially thrombosed L forearm fistula           CAPILLARY BLOOD GLUCOSE      POCT Blood Glucose.: 170 mg/dL (01 May 2021 17:37)  POCT Blood Glucose.: 172 mg/dL (01 May 2021 10:59)    MEDICATIONS  (STANDING):  chlorhexidine 2% Cloths 1 Application(s) Topical daily  dexMEDEtomidine Infusion 0.1 MICROgram(s)/kG/Hr (2.25 mL/Hr) IV Continuous <Continuous>  dextrose 40% Gel 15 Gram(s) Oral once  dextrose 5%. 1000 milliLiter(s) (50 mL/Hr) IV Continuous <Continuous>  dextrose 5%. 1000 milliLiter(s) (100 mL/Hr) IV Continuous <Continuous>  dextrose 50% Injectable 25 Gram(s) IV Push once  dextrose 50% Injectable 12.5 Gram(s) IV Push once  dextrose 50% Injectable 25 Gram(s) IV Push once  glucagon  Injectable 1 milliGRAM(s) IntraMuscular once  insulin lispro (ADMELOG) corrective regimen sliding scale   SubCutaneous every 6 hours  midodrine 5 milliGRAM(s) Oral <User Schedule>  Nephro-debbie 1 Tablet(s) Oral daily  norepinephrine Infusion 0.05 MICROgram(s)/kG/Min (8.45 mL/Hr) IV Continuous <Continuous>  pantoprazole  Injectable 40 milliGRAM(s) IV Push daily  senna 1 Tablet(s) Oral daily  valproate sodium IVPB 500 milliGRAM(s) IV Intermittent every 12 hours    MEDICATIONS  (PRN):  diltiazem Injectable 5 milliGRAM(s) IV Push every 4 hours PRN HR>120  hydrALAZINE Injectable 10 milliGRAM(s) IV Push every 2 hours PRN SBP >160  labetalol Injectable 10 milliGRAM(s) IV Push every 2 hours PRN Systolic blood pressure > 160 mmHg  polyethylene glycol 3350 17 Gram(s) Oral daily PRN Constipation    01 May 2021 07:01  -  02 May 2021 07:00  --------------------------------------------------------  IN:    Dexmedetomidine: 57.5 mL    Glucerna 1.5: 120 mL    IV PiggyBack: 100 mL    Norepinephrine: 48.4 mL  Total IN: 325.9 mL    OUT:  Total OUT: 0 mL    Total NET: 325.9 mL          02 May 2021 07:01  -  02 May 2021 09:17  --------------------------------------------------------  IN:    Dexmedetomidine: 9 mL    Norepinephrine: 13.4 mL  Total IN: 22.4 mL    OUT:  Total OUT: 0 mL    Total NET: 22.4 mL      LABS:    CAPILLARY BLOOD GLUCOSE  POCT Blood Glucose.: 170 mg/dL (01 May 2021 17:37)  POCT Blood Glucose.: 172 mg/dL (01 May 2021 10:59)      05-02    139  |  100  |  45.0<H>  ----------------------------<  139<H>  4.4   |  20.0<L>  |  7.29<H>    Ca    8.8      02 May 2021 04:24  Phos  5.2     05-02  Mg     2.2     05-02 05-01    137  |  99  |  37.0<H>  ----------------------------<  160<H>  4.2   |  25.0  |  5.10<H>    Ca    8.4<L>      01 May 2021 04:14  Phos  4.0     05-01  Mg     2.0     05-01    TPro  6.5<L>  /  Alb  2.9<L>  /  TBili  0.4  /  DBili  x   /  AST  20  /  ALT  7   /  AlkPhos  72  04-29 04-30    140  |  102  |  51.0<H>  ----------------------------<  159<H>  3.7   |  23.0  |  7.17<H>    Ca    8.5<L>      30 Apr 2021 04:55  Phos  5.1     04-30  Mg     2.3     04-30    TPro  6.5<L>  /  Alb  2.9<L>  /  TBili  0.4  /  DBili  x   /  AST  20  /  ALT  7   /  AlkPhos  72  04-29                               9.4    15.94 )-----------( 294      ( 02 May 2021 04:24 )             31.1                             12.0   30.65 )-----------( 492      ( 28 Apr 2021 23:01 )        EKG - Bifasicular Block with ST depression   Troponins - neg    ECHO-EF - 55%; Mild MR/TR ; Grade 1 Diastolic Dysfunction     EEG IMPRESSION/ 4/30- 5/1    Abnormal EEG study.  1. Multifocal potential epileptogenic foci, most prevalent in the left temporal and posterior quadrant regions.   2. Moderate to severe nonspecific diffuse or multifocal cerebral dysfunction.   3. No seizure seen.      STROKE CORE MEASURES:   HGBA1C - 8.7  Lipid - LDL- 41 / Trig- 83  TSH and Free T4- 1.15        IMAGING/DATA: [X ] Reviewed  CT Head No Cont (04.29.21 @ 09:20) >  INTERPRETATION:  CT brain without contrast    History intracranial hemorrhage    Comparison yesterday    Left posterior temporal and occipital gyriform hyperattenuation is stable in degree since the prior exam with maturing encephalomalacia. There is no new hemorrhage or cortical edema, mass effect, hydrocephalus or midline shift..    IMPRESSION:  Maturing infarct and developing laminar necrosis as above      Cultures  UA- neg  Blood Cultures- 4/28/21- No growth        EXAMINATION:  PHYSICAL EXAM:    Constitutional: No Acute Distress ; [facility] - # [id number]    Neurological:  Off precedex  open eyes to command ;  Not following commands, oriented to name only Pupils 2mm /3mm m R/L reactive ,    Motor exam: Moves spont once not sedated - trying to get out of bed ; restrained ; she was spitting early am          Upper extremity                         Delt     Bicep     Tricep    HG                                                 R        4+/5                                               L         4+/5          Lower extremity                        HF         KF        KE       DF         PF                                                  R        3/5 throughout                                               L        3/5 throughout                                                  Sensation:   [ X] decreased: - Hx of neuropathy       Pulmonary: Clear to Auscultation, No rales, No rhonchi, No wheezes     Cardiovascular: S1, S2, Regular rate and rhythm     Gastrointestinal: Soft, Non-tender, Non-distended     Extremities: No calf tenderness ; pulses 1+ DP and B/L site clean , dry and intact .

## 2021-05-02 NOTE — PROGRESS NOTE ADULT - SUBJECTIVE AND OBJECTIVE BOX
INTERVAL HPI/OVERNIGHT EVENTS:    HPI/OVERNIGHT EVENTS: Patient currently remains intubated and under acute care of NSICU service. Underwent HD 4/30 without issue via R femoral Shiley. Patient remains normotensive and tachycardic.    MEDICATIONS  (STANDING):  chlorhexidine 2% Cloths 1 Application(s) Topical daily  dextrose 40% Gel 15 Gram(s) Oral once  dextrose 5%. 1000 milliLiter(s) (50 mL/Hr) IV Continuous <Continuous>  dextrose 5%. 1000 milliLiter(s) (100 mL/Hr) IV Continuous <Continuous>  dextrose 50% Injectable 25 Gram(s) IV Push once  dextrose 50% Injectable 12.5 Gram(s) IV Push once  dextrose 50% Injectable 25 Gram(s) IV Push once  glucagon  Injectable 1 milliGRAM(s) IntraMuscular once  insulin lispro (ADMELOG) corrective regimen sliding scale   SubCutaneous every 6 hours  midodrine 5 milliGRAM(s) Oral <User Schedule>  Nephro-debbie 1 Tablet(s) Oral daily  norepinephrine Infusion 0.05 MICROgram(s)/kG/Min (8.45 mL/Hr) IV Continuous <Continuous>  pantoprazole  Injectable 40 milliGRAM(s) IV Push daily  senna 1 Tablet(s) Oral daily  valproate sodium IVPB 500 milliGRAM(s) IV Intermittent every 12 hours    MEDICATIONS  (PRN):  diazepam  Injectable 5 milliGRAM(s) IV Push once PRN agit  hydrALAZINE Injectable 10 milliGRAM(s) IV Push every 2 hours PRN SBP >160  labetalol Injectable 10 milliGRAM(s) IV Push every 2 hours PRN Systolic blood pressure > 160 mmHg  polyethylene glycol 3350 17 Gram(s) Oral daily PRN Constipation      Vital Signs Last 24 Hrs  T(C): 36.6 (02 May 2021 00:33), Max: 37.4 (01 May 2021 11:55)  T(F): 97.9 (02 May 2021 00:33), Max: 99.3 (01 May 2021 11:55)  HR: 125 (02 May 2021 01:00) (62 - 125)  BP: 90/31 (01 May 2021 20:00) (90/31 - 109/75)  BP(mean): 48 (01 May 2021 20:00) (48 - 86)  RR: 20 (02 May 2021 01:00) (13 - 28)  SpO2: 97% (02 May 2021 01:00) (61% - 100%)    General: no acute distress  HEENT: NGT in place  Cardio: RRR  Resp: Non labored breathing on RA  GI/Abd: Soft, NT/ND, no rebound/guarding, no masses palpated. R groin Shiley catheter, site c/d/i  Vascular: Left radiocephalic AVF with no palpable thrill or bruit. Palpable pulse proximally.   Pelvis: stable      I&O's Detail    30 Apr 2021 07:01  -  01 May 2021 07:00  --------------------------------------------------------  IN:    Dexmedetomidine: 314.8 mL    Glucerna 1.5: 330 mL    Norepinephrine: 14.8 mL  Total IN: 659.6 mL    OUT:    Nasogastric/Oral tube (mL): 150 mL    Other (mL): 500 mL  Total OUT: 650 mL    Total NET: 9.6 mL      01 May 2021 07:01  -  02 May 2021 01:08  --------------------------------------------------------  IN:    Dexmedetomidine: 57.5 mL    Glucerna 1.5: 120 mL    IV PiggyBack: 50 mL    Norepinephrine: 8.2 mL  Total IN: 235.7 mL    OUT:  Total OUT: 0 mL    Total NET: 235.7 mL          LABS:                        9.6    14.34 )-----------( 287      ( 01 May 2021 04:14 )             31.0     05-01    137  |  99  |  37.0<H>  ----------------------------<  160<H>  4.2   |  25.0  |  5.10<H>    Ca    8.4<L>      01 May 2021 04:14  Phos  4.0     05-01  Mg     2.0     05-01            RADIOLOGY & ADDITIONAL STUDIES:

## 2021-05-02 NOTE — PROGRESS NOTE ADULT - SUBJECTIVE AND OBJECTIVE BOX
HPI:    70 y/o female with PMHx CAD s/p stents '07, HTN, MI, PAF, liver abscess, s/p biliary stent with removal (11/2018; 7/2/19), chronic pancreatitis, DM2 on insulin, diabetic neuropathy, ESRD (5/2018) on HD (M/W/F since 5/18) presents to [facility] as a transfer from [place] after she went to HD today with and had  an episode of syncope and change in mental status. Patient had HD around 11am4/28/21  and 30 mins into HD patient vomited and had generalized weakness. HD was stopped, patient was awake and alert, and sepsis work up was done, WBC count 21K. Patient received 1st dose of vancomycin and RN reported she became unresponsive, L gaze preference, and was perseverating. Patient went for emergent CTH which revealed evolving L posterior cerebral artery infarct with new hemorrhage. Patient was subsequently intubated due to concern for deteriorating. Patient was then transferred to [facility].     Of note, patient was recently admitted to [place] on 4/12/21 for hypoxic/hypercapnic resp failure and hyperkalemia secondary to ESRD. While admitted pt was found to be confused, MRI obtained revealed large acute infarct of the left PCA. Pt was originally on Asprin which was changed to Plavix. Patient was discharged to rehab on Plavix.    Neurosurgery/ NSICU team evaluated patient at bedside, patient unable to provide further history due to mental status/intubation.  (28 Apr 2021 17:33)  At 1030 pm 4/28/21- PEA arrest and CPR 30secs and subsequently extubated and reintubated     Overnight events  - none    Extubated 5/1/21     Allergies    ertapenem (Urticaria)  Purell (Rash)    REVIEW OF SYSTEMS: [X ] Unable to Assess due to neurologic exam    Neuro: [ ] Headache [ ] Back pain [ ] Numbness [ ] Weakness [ ] Ataxia [ ] Dizziness [ ] Aphasia [ ] Dysarthria [ ] Visual disturbance  Resp: [ ] Shortness of breath/dyspnea, [ ] Orthopnea [ ] Cough  CV: [ ] Chest pain [ ] Palpitation [ ] Lightheadedness [ ] Syncope  Renal: [ ] Thirst [ ] Edema  GI: [ ] Nausea [ ] Emesis [ ] Abdominal pain [ ] Constipation [ ] Diarrhea  Hem: [ ] Hematemesis [ ] bright red blood per rectum  ID: [ ] Fever [ ] Chills [ ] Dysuria  ENT: [ ] Rhinorrhea    DEVICES:   [X ] Restraints  [ X] L fem Shiley ( Thrombosis in IJ [X ] R Fem  arterial line [x ] NGT/OGT  Partially thrombosed L forearm fistula     CAPILLARY BLOOD GLUCOSE    POCT Blood Glucose.: 170 mg/dL (01 May 2021 17:37)  POCT Blood Glucose.: 172 mg/dL (01 May 2021 10:59)    MEDICATIONS  (STANDING):    chlorhexidine 2% Cloths 1 Application(s) Topical daily  dexMEDEtomidine Infusion 0.1 MICROgram(s)/kG/Hr (2.25 mL/Hr) IV Continuous <Continuous>  dextrose 40% Gel 15 Gram(s) Oral once  dextrose 5%. 1000 milliLiter(s) (50 mL/Hr) IV Continuous <Continuous>  dextrose 5%. 1000 milliLiter(s) (100 mL/Hr) IV Continuous <Continuous>  dextrose 50% Injectable 25 Gram(s) IV Push once  dextrose 50% Injectable 12.5 Gram(s) IV Push once  dextrose 50% Injectable 25 Gram(s) IV Push once  glucagon  Injectable 1 milliGRAM(s) IntraMuscular once  insulin lispro (ADMELOG) corrective regimen sliding scale   SubCutaneous every 6 hours  midodrine 5 milliGRAM(s) Oral <User Schedule>  Nephro-aniyah 1 Tablet(s) Oral daily  norepinephrine Infusion 0.05 MICROgram(s)/kG/Min (8.45 mL/Hr) IV Continuous <Continuous>  pantoprazole  Injectable 40 milliGRAM(s) IV Push daily  senna 1 Tablet(s) Oral daily  valproate sodium IVPB 500 milliGRAM(s) IV Intermittent every 12 hours    MEDICATIONS  (PRN):  diltiazem Injectable 5 milliGRAM(s) IV Push every 4 hours PRN HR>120  hydrALAZINE Injectable 10 milliGRAM(s) IV Push every 2 hours PRN SBP >160  labetalol Injectable 10 milliGRAM(s) IV Push every 2 hours PRN Systolic blood pressure > 160 mmHg  polyethylene glycol 3350 17 Gram(s) Oral daily PRN Constipation    01 May 2021 07:01  -  02 May 2021 07:00  --------------------------------------------------------  IN:    Dexmedetomidine: 57.5 mL    Glucerna 1.5: 120 mL    IV PiggyBack: 100 mL    Norepinephrine: 48.4 mL  Total IN: 325.9 mL    OUT:  Total OUT: 0 mL    Total NET: 325.9 mL    02 May 2021 07:01  -  02 May 2021 09:17  --------------------------------------------------------  IN:    Dexmedetomidine: 9 mL    Norepinephrine: 13.4 mL  Total IN: 22.4 mL    OUT:  Total OUT: 0 mL    Total NET: 22.4 mL    LABS:    CAPILLARY BLOOD GLUCOSE  POCT Blood Glucose.: 170 mg/dL (01 May 2021 17:37)  POCT Blood Glucose.: 172 mg/dL (01 May 2021 10:59)    05-02    139  |  100  |  45.0<H>  ----------------------------<  139<H>  4.4   |  20.0<L>  |  7.29<H>    Ca    8.8      02 May 2021 04:24  Phos  5.2     05-02  Mg     2.2     05-02 05-01    137  |  99  |  37.0<H>  ----------------------------<  160<H>  4.2   |  25.0  |  5.10<H>    Ca    8.4<L>      01 May 2021 04:14  Phos  4.0     05-01  Mg     2.0     05-01    TPro  6.5<L>  /  Alb  2.9<L>  /  TBili  0.4  /  DBili  x   /  AST  20  /  ALT  7   /  AlkPhos  72  04-29 04-30    140  |  102  |  51.0<H>  ----------------------------<  159<H>  3.7   |  23.0  |  7.17<H>    Ca    8.5<L>      30 Apr 2021 04:55  Phos  5.1     04-30  Mg     2.3     04-30    TPro  6.5<L>  /  Alb  2.9<L>  /  TBili  0.4  /  DBili  x   /  AST  20  /  ALT  7   /  AlkPhos  72  04-29                               9.4    15.94 )-----------( 294      ( 02 May 2021 04:24 )             31.1                             12.0   30.65 )-----------( 492      ( 28 Apr 2021 23:01 )        EKG - Bifasicular Block with ST depression   Troponins - neg    ECHO-EF - 55%; Mild MR/TR ; Grade 1 Diastolic Dysfunction     EEG IMPRESSION/ 4/30- 5/1    Abnormal EEG study.  1. Multifocal potential epileptogenic foci, most prevalent in the left temporal and posterior quadrant regions.   2. Moderate to severe nonspecific diffuse or multifocal cerebral dysfunction.   3. No seizure seen.      STROKE CORE MEASURES:   HGBA1C - 8.7  Lipid - LDL- 41 / Trig- 83  TSH and Free T4- 1.15        IMAGING/DATA: [X ] Reviewed  CT Head No Cont (04.29.21 @ 09:20) >  INTERPRETATION:  CT brain without contrast    History intracranial hemorrhage    Comparison yesterday    Left posterior temporal and occipital gyriform hyperattenuation is stable in degree since the prior exam with maturing encephalomalacia. There is no new hemorrhage or cortical edema, mass effect, hydrocephalus or midline shift..    IMPRESSION:  Maturing infarct and developing laminar necrosis as above      Cultures  UA- neg  Blood Cultures- 4/28/21- No growth        EXAMINATION:  PHYSICAL EXAM:    Constitutional: No Acute Distress ; follows in April     Neurological:  On precedex at 0.2mcg/kg/min ;  open eyes to command ; Pupils 2mm /3mm m R/L reactive , Speaks [place]    Motor exam:          Upper extremity                         Delt     Bicep     Tricep    HG                                                 R        4+/5                                               L         4+/5          Lower extremity                        HF         KF        KE       DF         PF                                                  R        3/5 throughout                                               L        3/5 throughout                                                  Sensation:   [ X] decreased: - Hx of neuropathy       Pulmonary: Clear to Auscultation, No rales, No rhonchi, No wheezes     Cardiovascular: S1, S2, Regular rate and rhythm     Gastrointestinal: Soft, Non-tender, Non-distended     Extremities: No calf tenderness     71yF PMH HTN, CAD s/p MI with h/o PCI 2007, DM2 with associated diabetic neuropathy, ESRD on HD MWF 5/2018, chronic pancreatitis, paroxysmal afib 2018, recent admission for pneumonia with hospital course complicated with afib, L PCA CVA discharged on Plavix (not ACT as patient high risk for bleed), presented to [facility] ~ 11 AM, was at hemodialysis, received 30 minutes of HD, vomited x 1 and complained of generalized weakness, brought to ED, found with WBC 21k s/p Zosyn/Vancomycin, developed seizure like activity, CTH done found with hemorrhagic conversion in L PCA  - GCS E-2 V-1T M-5= 8    NEURO:  - Q2 neuro checks  - Check W/U CTA at OSH  -  CTH scan today- Start ASA  - Once stable heme on CT  will address starting DOAC ( CHADS VASc > 2 + afib)  - Valproic acid 500mg q 12  - EEG - No seizures D/C   - OFF Plavix + ASA for prior stroke due hemorrhagic conversion  -  precedex wean and trial of Haldol 0.5 mg IV x1   - Activity: [] mobilize as tolerated [x] Bedrest for now [] PT [] OT [] PMNR    PULM:  - SpO2 goal > 92%    CV: CAD/ Hx of Bifascicular Block ; Hx of PAF   - SBP goal 100-150  - Tolerate SBP > 100 or MAP > 65   - Lipid LDL 41 from 4/14, no need for statin at this point  - TTE - EF 45-50%;  ; Will Need LIBIA  - Midodrine - 5mg q 12- weaned off neosynephrine     RENAL: ESRD - dialysis - M/W/F since 2018   dialysis last 4/30/21  Nephro ANIYAH  - Fluids: NS @ 50 for now  - vascular to access L forearm fistula; --> evaluated by vascular and awaiting fistulagram   Ultrasound- thrombosis of Cephalic outflow vein ; fistula is patent with low velocities    GI:  - Diet : glucerna    ENDO:   - Goal euglycemia (-180 mg., )  - Q6 finger sticks   A1C 8.7% 4/13/21, TSH 1.15 4/14/21    ID:  - F/u blood cultures from [place]- neg to date   - Monitor for fever,

## 2021-05-03 LAB
ANION GAP SERPL CALC-SCNC: 17 MMOL/L — SIGNIFICANT CHANGE UP (ref 5–17)
BUN SERPL-MCNC: 55 MG/DL — HIGH (ref 8–20)
CALCIUM SERPL-MCNC: 8.2 MG/DL — LOW (ref 8.6–10.2)
CHLORIDE SERPL-SCNC: 105 MMOL/L — SIGNIFICANT CHANGE UP (ref 98–107)
CO2 SERPL-SCNC: 21 MMOL/L — LOW (ref 22–29)
CREAT SERPL-MCNC: 8.37 MG/DL — HIGH (ref 0.5–1.3)
CULTURE RESULTS: SIGNIFICANT CHANGE UP
CULTURE RESULTS: SIGNIFICANT CHANGE UP
GLUCOSE BLDC GLUCOMTR-MCNC: 126 MG/DL — HIGH (ref 70–99)
GLUCOSE BLDC GLUCOMTR-MCNC: 129 MG/DL — HIGH (ref 70–99)
GLUCOSE BLDC GLUCOMTR-MCNC: 135 MG/DL — HIGH (ref 70–99)
GLUCOSE BLDC GLUCOMTR-MCNC: 154 MG/DL — HIGH (ref 70–99)
GLUCOSE BLDC GLUCOMTR-MCNC: 160 MG/DL — HIGH (ref 70–99)
GLUCOSE SERPL-MCNC: 137 MG/DL — HIGH (ref 70–99)
HCT VFR BLD CALC: 27.8 % — LOW (ref 34.5–45)
HGB BLD-MCNC: 8.5 G/DL — LOW (ref 11.5–15.5)
MAGNESIUM SERPL-MCNC: 2.2 MG/DL — SIGNIFICANT CHANGE UP (ref 1.6–2.6)
MCHC RBC-ENTMCNC: 28.8 PG — SIGNIFICANT CHANGE UP (ref 27–34)
MCHC RBC-ENTMCNC: 30.6 GM/DL — LOW (ref 32–36)
MCV RBC AUTO: 94.2 FL — SIGNIFICANT CHANGE UP (ref 80–100)
PHOSPHATE SERPL-MCNC: 6 MG/DL — HIGH (ref 2.4–4.7)
PLATELET # BLD AUTO: 234 K/UL — SIGNIFICANT CHANGE UP (ref 150–400)
POTASSIUM SERPL-MCNC: 5 MMOL/L — SIGNIFICANT CHANGE UP (ref 3.5–5.3)
POTASSIUM SERPL-SCNC: 5 MMOL/L — SIGNIFICANT CHANGE UP (ref 3.5–5.3)
RBC # BLD: 2.95 M/UL — LOW (ref 3.8–5.2)
RBC # FLD: 14.6 % — HIGH (ref 10.3–14.5)
SODIUM SERPL-SCNC: 143 MMOL/L — SIGNIFICANT CHANGE UP (ref 135–145)
SPECIMEN SOURCE: SIGNIFICANT CHANGE UP
SPECIMEN SOURCE: SIGNIFICANT CHANGE UP
WBC # BLD: 9.55 K/UL — SIGNIFICANT CHANGE UP (ref 3.8–10.5)
WBC # FLD AUTO: 9.55 K/UL — SIGNIFICANT CHANGE UP (ref 3.8–10.5)

## 2021-05-03 PROCEDURE — 71045 X-RAY EXAM CHEST 1 VIEW: CPT | Mod: 26

## 2021-05-03 PROCEDURE — 99291 CRITICAL CARE FIRST HOUR: CPT

## 2021-05-03 PROCEDURE — 90937 HEMODIALYSIS REPEATED EVAL: CPT

## 2021-05-03 PROCEDURE — 70450 CT HEAD/BRAIN W/O DYE: CPT | Mod: 26

## 2021-05-03 RX ORDER — MIDODRINE HYDROCHLORIDE 2.5 MG/1
2.5 TABLET ORAL
Refills: 0 | Status: DISCONTINUED | OUTPATIENT
Start: 2021-05-03 | End: 2021-05-06

## 2021-05-03 RX ORDER — ASPIRIN/CALCIUM CARB/MAGNESIUM 324 MG
81 TABLET ORAL DAILY
Refills: 0 | Status: DISCONTINUED | OUTPATIENT
Start: 2021-05-03 | End: 2021-05-08

## 2021-05-03 RX ORDER — ACETAMINOPHEN 500 MG
1000 TABLET ORAL ONCE
Refills: 0 | Status: COMPLETED | OUTPATIENT
Start: 2021-05-03 | End: 2021-05-03

## 2021-05-03 RX ORDER — PANTOPRAZOLE SODIUM 20 MG/1
40 TABLET, DELAYED RELEASE ORAL DAILY
Refills: 0 | Status: DISCONTINUED | OUTPATIENT
Start: 2021-05-03 | End: 2021-05-08

## 2021-05-03 RX ORDER — SENNA PLUS 8.6 MG/1
2 TABLET ORAL AT BEDTIME
Refills: 0 | Status: DISCONTINUED | OUTPATIENT
Start: 2021-05-03 | End: 2021-05-23

## 2021-05-03 RX ORDER — ERYTHROPOIETIN 10000 [IU]/ML
10000 INJECTION, SOLUTION INTRAVENOUS; SUBCUTANEOUS
Refills: 0 | Status: DISCONTINUED | OUTPATIENT
Start: 2021-05-03 | End: 2021-05-19

## 2021-05-03 RX ORDER — POLYETHYLENE GLYCOL 3350 17 G/17G
17 POWDER, FOR SOLUTION ORAL DAILY
Refills: 0 | Status: DISCONTINUED | OUTPATIENT
Start: 2021-05-03 | End: 2021-05-23

## 2021-05-03 RX ORDER — ACETAMINOPHEN 500 MG
650 TABLET ORAL EVERY 6 HOURS
Refills: 0 | Status: DISCONTINUED | OUTPATIENT
Start: 2021-05-03 | End: 2021-05-23

## 2021-05-03 RX ADMIN — CHLORHEXIDINE GLUCONATE 1 APPLICATION(S): 213 SOLUTION TOPICAL at 14:45

## 2021-05-03 RX ADMIN — PANTOPRAZOLE SODIUM 40 MILLIGRAM(S): 20 TABLET, DELAYED RELEASE ORAL at 12:52

## 2021-05-03 RX ADMIN — MIDODRINE HYDROCHLORIDE 5 MILLIGRAM(S): 2.5 TABLET ORAL at 05:25

## 2021-05-03 RX ADMIN — ERYTHROPOIETIN 10000 UNIT(S): 10000 INJECTION, SOLUTION INTRAVENOUS; SUBCUTANEOUS at 13:04

## 2021-05-03 RX ADMIN — MIDODRINE HYDROCHLORIDE 2.5 MILLIGRAM(S): 2.5 TABLET ORAL at 18:05

## 2021-05-03 RX ADMIN — DEXMEDETOMIDINE HYDROCHLORIDE IN 0.9% SODIUM CHLORIDE 2.25 MICROGRAM(S)/KG/HR: 4 INJECTION INTRAVENOUS at 23:23

## 2021-05-03 RX ADMIN — Medication 1000 MILLIGRAM(S): at 16:45

## 2021-05-03 RX ADMIN — DEXMEDETOMIDINE HYDROCHLORIDE IN 0.9% SODIUM CHLORIDE 2.25 MICROGRAM(S)/KG/HR: 4 INJECTION INTRAVENOUS at 04:26

## 2021-05-03 RX ADMIN — Medication 2: at 23:17

## 2021-05-03 RX ADMIN — Medication 27.5 MILLIGRAM(S): at 18:05

## 2021-05-03 RX ADMIN — Medication 1 TABLET(S): at 12:50

## 2021-05-03 RX ADMIN — Medication 27.5 MILLIGRAM(S): at 05:24

## 2021-05-03 RX ADMIN — SENNA PLUS 2 TABLET(S): 8.6 TABLET ORAL at 20:11

## 2021-05-03 RX ADMIN — POLYETHYLENE GLYCOL 3350 17 GRAM(S): 17 POWDER, FOR SOLUTION ORAL at 12:50

## 2021-05-03 RX ADMIN — DEXMEDETOMIDINE HYDROCHLORIDE IN 0.9% SODIUM CHLORIDE 2.25 MICROGRAM(S)/KG/HR: 4 INJECTION INTRAVENOUS at 18:31

## 2021-05-03 RX ADMIN — Medication 400 MILLIGRAM(S): at 16:11

## 2021-05-03 RX ADMIN — SODIUM CHLORIDE 50 MILLILITER(S): 9 INJECTION, SOLUTION INTRAVENOUS at 04:26

## 2021-05-03 RX ADMIN — Medication 2: at 18:27

## 2021-05-03 NOTE — PROGRESS NOTE ADULT - ASSESSMENT
71yF with new hemorrhagic conversion in L PCA territory.  Recent admission for pneumonia with hospital course complicated with L PCA CVA discharged on Plavix (not ACT as patient was high risk for bleed).with hemorrhagic conversion in L PCA.  Extensive PMH incl. HTN, CAD s/p MI with h/o PCI 2007, HFrEf LV EF 45% + mild syst RV dysfunction, DM2 with diabetic neuropathy, ESRD on HD MWF 5/2018, chronic pancreatitis, paroxysmal afib 2018.    Plan:  -q4hr neurochecks  -off anti-plts and AC due to increased hemorrhagic conversion  -will repeat CTh today, if stable - restart ASA 81 daily and SQH 5000 q 8hr for DVT ppx  -high CHADSVAsc - possible initiation of AC later - reassess in 1wk in view of large area of stroke with recent heme conversion  -Valproic acid 500mg q 12hr, cont (reported sz-like activity on admission)  -sedation - Precedex PRN  - Activity: [] mobilize as tolerated, PT/OT  -SpO2 goal > 92%  - SBP goal 100-150, MAP > 65   - Lipid LDL 41 from 4/14, will restart low dose statin  - Midodrine - 5mg q 12- weaned off neosynephrine, start tapering Midodrine 2.5 q 12 + PRN 15 min prior to HD if SBP   -ESRD - dialysis - M/W/F    - vascular to access L forearm fistula; --> Vascular involved, thrombosis of Cephalic outflow vein ; fistula is patent with low velocities  -S/S, cont ATF for now  - GI prophylaxis  [x] PPI   - Bowel regimen [] colace [x] senna once PO access obtained [x] other: miralax   - Goal euglycemia (-180), A1C 8.7%  - VTE prophylaxis: [x] SCDs [x] chemoprophylaxis - hold DVT prophylaxsis with CT as above      CODE STATUS:  [x] Full Code [] DNR [] DNI [] Palliative/Comfort Care    DISPOSITION:  [x] ICU, possible downgrade if stable CTh    [x] Patient is at high risk of neurologic deterioration/death due to: seizure, re-bleed, respiratory failure

## 2021-05-03 NOTE — PROGRESS NOTE ADULT - SUBJECTIVE AND OBJECTIVE BOX
[facility] DIVISION OF KIDNEY DISEASES AND HYPERTENSION -- FOLLOW UP NOTE  --------------------------------------------------------------------------------  Chief Complaint:  ESRD HD  24 hour events/subjective:  Seen/examined  On HD today;  Tolerating      PAST HISTORY  --------------------------------------------------------------------------------  No significant changes to PMH, PSH, FHx, SHx, unless otherwise noted    ALLERGIES & MEDICATIONS  --------------------------------------------------------------------------------  Allergies    ertapenem (Urticaria)  Purell (Rash)    Intolerances      Standing Inpatient Medications  chlorhexidine 2% Cloths 1 Application(s) Topical daily  dexMEDEtomidine Infusion 0.1 MICROgram(s)/kG/Hr IV Continuous <Continuous>  dextrose 40% Gel 15 Gram(s) Oral once  dextrose 5%. 1000 milliLiter(s) IV Continuous <Continuous>  dextrose 5%. 1000 milliLiter(s) IV Continuous <Continuous>  dextrose 50% Injectable 25 Gram(s) IV Push once  dextrose 50% Injectable 12.5 Gram(s) IV Push once  dextrose 50% Injectable 25 Gram(s) IV Push once  epoetin analilia-epbx (RETACRIT) Injectable 84487 Unit(s) IV Push <User Schedule>  glucagon  Injectable 1 milliGRAM(s) IntraMuscular once  insulin lispro (ADMELOG) corrective regimen sliding scale   SubCutaneous every 6 hours  midodrine 5 milliGRAM(s) Oral <User Schedule>  Nephro-debbie 1 Tablet(s) Oral daily  pantoprazole   Suspension 40 milliGRAM(s) Oral daily  polyethylene glycol 3350 17 Gram(s) Oral daily  senna 2 Tablet(s) Oral at bedtime  valproate sodium IVPB 500 milliGRAM(s) IV Intermittent every 12 hours    PRN Inpatient Medications      REVIEW OF SYSTEMS  --------------------------------------------------------------------------------  Gen: No weight changes, fatigue, fevers/chills, weakness  Skin: No rashes  Head/Eyes/Ears/Mouth: No headache; Normal hearing; Normal vision w/o blurriness; No sinus pain/discomfort, sore throat  Respiratory: No dyspnea, cough, wheezing, hemoptysis  CV: No chest pain, PND, orthopnea  GI: No abdominal pain, diarrhea, constipation, nausea, vomiting, melena, hematochezia  : No increased frequency, dysuria, hematuria, nocturia  MSK: No joint pain/swelling; no back pain; no edema  Neuro: No dizziness/lightheadedness, weakness, seizures, numbness, tingling  Heme: No easy bruising or bleeding  Endo: No heat/cold intolerance  Psych: No significant nervousness, anxiety, stress, depression    All other systems were reviewed and are negative, except as noted.    VITALS/PHYSICAL EXAM  --------------------------------------------------------------------------------  T(C): 36.9 (05-03-21 @ 10:00), Max: 37.1 (05-02-21 @ 20:00)  HR: 94 (05-03-21 @ 11:00) (69 - 94)  BP: 128/60 (05-03-21 @ 10:00) (86/65 - 132/57)  RR: 19 (05-03-21 @ 11:00) (15 - 25)  SpO2: 93% (05-03-21 @ 11:00) (93% - 100%)  Wt(kg): --        05-02-21 @ 07:01  -  05-03-21 @ 07:00  --------------------------------------------------------  IN: 1292.8 mL / OUT: 0 mL / NET: 1292.8 mL    05-03-21 @ 07:01  -  05-03-21 @ 11:44  --------------------------------------------------------  IN: 234.5 mL / OUT: 0 mL / NET: 234.5 mL      Physical Exam:  	Gen: NAD   	HEENT: PERRL, supple neck, clear oropharynx  	Pulm: CTA B/L  	CV: RRR, S1S2; no rub  	Back: No spinal or CVA tenderness; no sacral edema  	Abd: +BS, soft, nontender/nondistended  	: No suprapubic tenderness  	UE: Warm, FROM, no clubbing, intact strength; no edema; no asterixis  	LE: Warm, FROM, no clubbing, intact strength; no edema  	Neuro: No focal deficits, intact gait  	Psych: Normal affect and mood  	Skin: Warm, without rashes  	Vascular access: femoral nontunneled CVC;    LABS/STUDIES  --------------------------------------------------------------------------------              8.5    9.55  >-----------<  234      [05-03-21 @ 04:25]              27.8     143  |  105  |  55.0  ----------------------------<  137      [05-03-21 @ 04:25]  5.0   |  21.0  |  8.37        Ca     8.2     [05-03-21 @ 04:25]      Mg     2.2     [05-03-21 @ 04:25]      Phos  6.0     [05-03-21 @ 04:25]            Creatinine Trend:  SCr 8.37 [05-03 @ 04:25]  SCr 7.29 [05-02 @ 04:24]  SCr 5.10 [05-01 @ 04:14]  SCr 7.17 [04-30 @ 04:55]  SCr 8.23 [04-29 @ 17:56]        HbA1c 9.4      [02-07-20 @ 21:14]  TSH 1.15      [04-14-21 @ 08:13]  Lipid: chol 106, , HDL 40, LDL --      [04-30-21 @ 04:55]    HBsAb <3.0      [04-13-21 @ 02:54]  HBsAg Nonreact      [04-13-21 @ 02:54]  HCV 0.22, Nonreact      [04-13-21 @ 02:54]

## 2021-05-03 NOTE — PROGRESS NOTE ADULT - ASSESSMENT
1) ESRD on HD  2) MBD of renal dx  3) Anemia of renal dx  4) Vol HTN    Reevaluated on HD  On epogen 10k units TIW    Discussed in detail with neurosurgery

## 2021-05-03 NOTE — PROGRESS NOTE ADULT - SUBJECTIVE AND OBJECTIVE BOX
72 y/o female with PMHx CAD s/p stents '07, HTN, MI, PAF, liver abscess, s/p biliary stent with removal (11/2018; 7/2/19), chronic pancreatitis, DM2 on insulin, diabetic neuropathy, ESRD (5/2018) on HD (M/W/F ) presents to [facility] as a transfer from [place] after she went to HD today with and had  an episode of syncope and change in mental status. Patient had HD around 11am4/28/21  and 30 mins into HD patient vomited and had generalized weakness. HD was stopped, patient was awake and alert, and sepsis work up was done, WBC count 21K. Patient received 1st dose of vancomycin and RN reported she became unresponsive, L gaze preference, and was perseverating. Patient went for emergent CTH which revealed evolving L posterior cerebral artery infarct with new hemorrhage. Patient was subsequently intubated due to concern for deteriorating. Patient was then transferred to [facility].   Of note, patient was recently admitted to [place] on 4/12/21 for hypoxic/hypercapnic resp failure and hyperkalemia secondary to ESRD. While admitted pt was found to be confused, MRI obtained revealed large acute infarct of the left PCA. Pt was originally on Asprin which was changed to Plavix. Patient was discharged to rehab on Plavix.  Neurosurgery/NSICU team evaluated patient at bedside, patient unable to provide further history due to mental status/intubation.  (28 Apr 2021 17:33)  At 1030 pm 4/28/21- PEA arrest and CPR 30secs.  Extubated 5/1/21.    Overnight events  - none reported.  VS, labs, imaging reviewed.    PHYSICAL EXAM:  Constitutional: No Acute Distress but barely cooperates with exam,   participated in exam  Neurological:  oriented to name only, follows some commands refuses others, pupils 2mm /3mm  R/L reactive.  Motor exam: noted to move spont but refused motor exam.  Sensation:   [ X] decreased: - Hx of neuropathy  Pulmonary: Clear to Auscultation, No rales, No rhonchi, No wheezes   Cardiovascular: S1, S2 present  Gastrointestinal: Soft, NT

## 2021-05-04 LAB
ANION GAP SERPL CALC-SCNC: 11 MMOL/L — SIGNIFICANT CHANGE UP (ref 5–17)
BUN SERPL-MCNC: 41 MG/DL — HIGH (ref 8–20)
CALCIUM SERPL-MCNC: 8.6 MG/DL — SIGNIFICANT CHANGE UP (ref 8.6–10.2)
CHLORIDE SERPL-SCNC: 103 MMOL/L — SIGNIFICANT CHANGE UP (ref 98–107)
CO2 SERPL-SCNC: 26 MMOL/L — SIGNIFICANT CHANGE UP (ref 22–29)
CREAT SERPL-MCNC: 5.86 MG/DL — HIGH (ref 0.5–1.3)
CULTURE RESULTS: SIGNIFICANT CHANGE UP
GLUCOSE BLDC GLUCOMTR-MCNC: 106 MG/DL — HIGH (ref 70–99)
GLUCOSE BLDC GLUCOMTR-MCNC: 107 MG/DL — HIGH (ref 70–99)
GLUCOSE BLDC GLUCOMTR-MCNC: 153 MG/DL — HIGH (ref 70–99)
GLUCOSE SERPL-MCNC: 131 MG/DL — HIGH (ref 70–99)
HCT VFR BLD CALC: 28.8 % — LOW (ref 34.5–45)
HGB BLD-MCNC: 8.8 G/DL — LOW (ref 11.5–15.5)
MAGNESIUM SERPL-MCNC: 2.1 MG/DL — SIGNIFICANT CHANGE UP (ref 1.6–2.6)
MCHC RBC-ENTMCNC: 28.9 PG — SIGNIFICANT CHANGE UP (ref 27–34)
MCHC RBC-ENTMCNC: 30.6 GM/DL — LOW (ref 32–36)
MCV RBC AUTO: 94.7 FL — SIGNIFICANT CHANGE UP (ref 80–100)
PHOSPHATE SERPL-MCNC: 4.8 MG/DL — HIGH (ref 2.4–4.7)
PLATELET # BLD AUTO: 191 K/UL — SIGNIFICANT CHANGE UP (ref 150–400)
POTASSIUM SERPL-MCNC: 4.7 MMOL/L — SIGNIFICANT CHANGE UP (ref 3.5–5.3)
POTASSIUM SERPL-SCNC: 4.7 MMOL/L — SIGNIFICANT CHANGE UP (ref 3.5–5.3)
RBC # BLD: 3.04 M/UL — LOW (ref 3.8–5.2)
RBC # FLD: 14.6 % — HIGH (ref 10.3–14.5)
SODIUM SERPL-SCNC: 140 MMOL/L — SIGNIFICANT CHANGE UP (ref 135–145)
SPECIMEN SOURCE: SIGNIFICANT CHANGE UP
WBC # BLD: 9.77 K/UL — SIGNIFICANT CHANGE UP (ref 3.8–10.5)
WBC # FLD AUTO: 9.77 K/UL — SIGNIFICANT CHANGE UP (ref 3.8–10.5)

## 2021-05-04 PROCEDURE — 99233 SBSQ HOSP IP/OBS HIGH 50: CPT

## 2021-05-04 PROCEDURE — 99223 1ST HOSP IP/OBS HIGH 75: CPT

## 2021-05-04 PROCEDURE — 99234 HOSP IP/OBS SM DT SF/LOW 45: CPT

## 2021-05-04 PROCEDURE — 99232 SBSQ HOSP IP/OBS MODERATE 35: CPT

## 2021-05-04 RX ORDER — HEPARIN SODIUM 5000 [USP'U]/ML
5000 INJECTION INTRAVENOUS; SUBCUTANEOUS EVERY 8 HOURS
Refills: 0 | Status: DISCONTINUED | OUTPATIENT
Start: 2021-05-04 | End: 2021-05-11

## 2021-05-04 RX ORDER — ATORVASTATIN CALCIUM 80 MG/1
10 TABLET, FILM COATED ORAL AT BEDTIME
Refills: 0 | Status: DISCONTINUED | OUTPATIENT
Start: 2021-05-04 | End: 2021-05-04

## 2021-05-04 RX ORDER — HALOPERIDOL DECANOATE 100 MG/ML
2 INJECTION INTRAMUSCULAR ONCE
Refills: 0 | Status: COMPLETED | OUTPATIENT
Start: 2021-05-04 | End: 2021-05-04

## 2021-05-04 RX ORDER — ATORVASTATIN CALCIUM 80 MG/1
40 TABLET, FILM COATED ORAL AT BEDTIME
Refills: 0 | Status: DISCONTINUED | OUTPATIENT
Start: 2021-05-04 | End: 2021-05-23

## 2021-05-04 RX ADMIN — Medication 81 MILLIGRAM(S): at 11:54

## 2021-05-04 RX ADMIN — Medication 27.5 MILLIGRAM(S): at 04:33

## 2021-05-04 RX ADMIN — HEPARIN SODIUM 5000 UNIT(S): 5000 INJECTION INTRAVENOUS; SUBCUTANEOUS at 21:53

## 2021-05-04 RX ADMIN — MIDODRINE HYDROCHLORIDE 2.5 MILLIGRAM(S): 2.5 TABLET ORAL at 15:54

## 2021-05-04 RX ADMIN — Medication 2: at 04:35

## 2021-05-04 RX ADMIN — HALOPERIDOL DECANOATE 2 MILLIGRAM(S): 100 INJECTION INTRAMUSCULAR at 21:52

## 2021-05-04 RX ADMIN — MIDODRINE HYDROCHLORIDE 2.5 MILLIGRAM(S): 2.5 TABLET ORAL at 04:33

## 2021-05-04 RX ADMIN — CHLORHEXIDINE GLUCONATE 1 APPLICATION(S): 213 SOLUTION TOPICAL at 12:16

## 2021-05-04 RX ADMIN — HEPARIN SODIUM 5000 UNIT(S): 5000 INJECTION INTRAVENOUS; SUBCUTANEOUS at 14:26

## 2021-05-04 RX ADMIN — Medication 1 TABLET(S): at 11:55

## 2021-05-04 RX ADMIN — ATORVASTATIN CALCIUM 40 MILLIGRAM(S): 80 TABLET, FILM COATED ORAL at 21:52

## 2021-05-04 RX ADMIN — Medication 27.5 MILLIGRAM(S): at 17:27

## 2021-05-04 RX ADMIN — PANTOPRAZOLE SODIUM 40 MILLIGRAM(S): 20 TABLET, DELAYED RELEASE ORAL at 11:55

## 2021-05-04 RX ADMIN — DEXMEDETOMIDINE HYDROCHLORIDE IN 0.9% SODIUM CHLORIDE 2.25 MICROGRAM(S)/KG/HR: 4 INJECTION INTRAVENOUS at 02:59

## 2021-05-04 RX ADMIN — SENNA PLUS 2 TABLET(S): 8.6 TABLET ORAL at 21:52

## 2021-05-04 NOTE — CHART NOTE - NSCHARTNOTEFT_GEN_A_CORE
HPI:  70 y/o female with PMHx CAD s/p stents '07, HTN, MI, PAF, liver abscess, s/p biliary stent with removal (11/2018; 7/2/19), chronic pancreatitis, DM2 on insulin, diabetic neuropathy, ESRD (5/2018) on HD (M/W/F since 5/18) presents to [facility] as a transfer from [place] after she went to HD today with and had  an episode of syncope and change in mental status. Patient had HD around 11am today, and 30 mins into HD patient vomited and had generalized weakness. HD was stopped, patient was awake and alert, and sepsis work up was done, WBC count 21K. Patient received 1st dose of vancomycin and RN reported she became unresponsive, L gaze preference, and was perseverating. Patient went for emergent CTH which revealed evolving L posterior cerebral artery infarct with new hemorrhage. Patient was subsequently intubated due to concern for deteriorating. Patient was then transferred to [facility].     Of note, patient was recently admitted to [place] on 4/12/21 for hypoxic/hypercapnic resp failure and hyperkalemia secondary to ESRD. While admitted pt was found to be confused, MRI obtained revealed large acute infarct of the left PCA. Pt was originally on Asprin which was changed to Plavix. Patient was discharged to rehab on Plavix.    Neurosurgery/NSICU team evaluated patient at bedside, patient unable to provide further history due to mental status/intubation.  (28 Apr 2021 17:33)      Vital Signs Last 24 Hrs  T(C): 37.1 (04 May 2021 18:32), Max: 37.1 (04 May 2021 18:32)  T(F): 98.7 (04 May 2021 18:32), Max: 98.7 (04 May 2021 18:32)  HR: 81 (04 May 2021 18:32) (54 - 96)  BP: 124/85 (04 May 2021 18:32) (88/51 - 125/94)  BP(mean): 103 (04 May 2021 18:00) (63 - 103)  RR: 18 (04 May 2021 18:32) (14 - 22)  SpO2: 98% (04 May 2021 18:32) (78% - 100%)    PHYSICAL EXAM:  Constitutional: No Acute Distress, intermittently cooperates with exam,   participated in exam  Neurological:  oriented to name only, follows some commands refuses others, pupils 2mm /3mm  R/L reactive.  Motor exam: noted to move spontaneous extremities x4 but refused motor exam.  Sensation:   [ X] decreased: - Hx of neuropathy    LABS:                        8.8    9.77  )-----------( 191      ( 04 May 2021 04:46 )             28.8     05-04    140  |  103  |  41.0<H>  ----------------------------<  131<H>  4.7   |  26.0  |  5.86<H>    Ca    8.6      04 May 2021 04:46  Phos  4.8     05-04  Mg     2.1     05-04 05-03 @ 07:01  -  05-04 @ 07:00  --------------------------------------------------------  IN: 1375.8 mL / OUT: 1120 mL / NET: 255.8 mL    05-04 @ 07:01  -  05-04 @ 19:32  --------------------------------------------------------  IN: 70 mL / OUT: 0 mL / NET: 70 mL        RADIOLOGY & ADDITIONAL TESTS:  < from: CT Head No Cont (05.03.21 @ 19:04) >     EXAM:  CT BRAIN                          PROCEDURE DATE:  05/03/2021          INTERPRETATION:  .    CLINICAL INFORMATION: Intracranial hemorrhage. Follow up    TECHNIQUE: Multiple axial CT images of the head were obtained without contrast. Sagittal and coronal reconstructed images were acquired from the source data.    COMPARISON: No prior CT studies of the brain are available for comparison at this institution.    FINDINGS: An evolving left-sided PCA distribution infarction is again seen withareas of gyral hyperattenuation. Findings may indicate superimposed petechial hemorrhagic transformation versus cortical laminar necrosis versus a combination of both.    No new areas of acute intracranial hemorrhage are seen.    Multiple small chronic infarcts are noted within the right cerebellar hemisphere. There is no hydrocephalus.    There is diffuse cerebral volume loss with prominence of the sulci, fissures, and cisternal spaces which is normal for the patient's age. There is mild periventricular white matter hypoattenuation statistically compatible with microvascular changes given calcific atherosclerotic disease of the intracranial arteries.    The paranasal sinuses and mastoid air cells are clear. The calvarium appears intact. The orbits appear unremarkable apart from cataract removal.    IMPRESSION: Stable follow-up CT examination.    < end of copied text >            CAPRINI SCORE [CLOT]:  Patient has an estimated Caprini score of greater than 5.  However, the patient's unique clinical situation will be addressed in an individual manner to determine appropriate anticoagulation treatment, if any.      HOSPITAL COURSE:  4/28 Transfer from [facility] found with evolving left PCA infarct with heme conversion. Admit to NSICU. Coded, ROSC x1 round of CPR. Fem alpesh placed, RIJ TLC. Self extubated--> Reintubated. echo: ef 45-50%. enlarged LA, increased LA pressure   4/29 HPI:  72 y/o female with PMHx CAD s/p stents '07, HTN, MI, PAF, liver abscess, s/p biliary stent with removal (11/2018; 7/2/19), chronic pancreatitis, DM2 on insulin, diabetic neuropathy, ESRD (5/2018) on HD (M/W/F since 5/18) presents to [facility] as a transfer from [place] after she went to HD today with and had  an episode of syncope and change in mental status. Patient had HD around 11am today, and 30 mins into HD patient vomited and had generalized weakness. HD was stopped, patient was awake and alert, and sepsis work up was done, WBC count 21K. Patient received 1st dose of vancomycin and RN reported she became unresponsive, L gaze preference, and was perseverating. Patient went for emergent CTH which revealed evolving L posterior cerebral artery infarct with new hemorrhage. Patient was subsequently intubated due to concern for deteriorating. Patient was then transferred to [facility].     Of note, patient was recently admitted to [place] on 4/12/21 for hypoxic/hypercapnic resp failure and hyperkalemia secondary to ESRD. While admitted pt was found to be confused, MRI obtained revealed large acute infarct of the left PCA. Pt was originally on Asprin which was changed to Plavix. Patient was discharged to rehab on Plavix.    Neurosurgery/NSICU team evaluated patient at bedside, patient unable to provide further history due to mental status/intubation.  (28 Apr 2021 17:33)      Vital Signs Last 24 Hrs  T(C): 37.1 (04 May 2021 18:32), Max: 37.1 (04 May 2021 18:32)  T(F): 98.7 (04 May 2021 18:32), Max: 98.7 (04 May 2021 18:32)  HR: 81 (04 May 2021 18:32) (54 - 96)  BP: 124/85 (04 May 2021 18:32) (88/51 - 125/94)  BP(mean): 103 (04 May 2021 18:00) (63 - 103)  RR: 18 (04 May 2021 18:32) (14 - 22)  SpO2: 98% (04 May 2021 18:32) (78% - 100%)    PHYSICAL EXAM:  Constitutional: No Acute Distress, intermittently cooperates with exam,   participated in exam  Neurological:  oriented to name only, follows some commands refuses others, pupils 2mm /3mm  R/L reactive.  Motor exam: noted to move spontaneous extremities x4 but refused motor exam.  Sensation:   [ X] decreased: - Hx of neuropathy    LABS:                        8.8    9.77  )-----------( 191      ( 04 May 2021 04:46 )             28.8     05-04    140  |  103  |  41.0<H>  ----------------------------<  131<H>  4.7   |  26.0  |  5.86<H>    Ca    8.6      04 May 2021 04:46  Phos  4.8     05-04  Mg     2.1     05-04 05-03 @ 07:01  -  05-04 @ 07:00  --------------------------------------------------------  IN: 1375.8 mL / OUT: 1120 mL / NET: 255.8 mL    05-04 @ 07:01  -  05-04 @ 19:32  --------------------------------------------------------  IN: 70 mL / OUT: 0 mL / NET: 70 mL        RADIOLOGY & ADDITIONAL TESTS:  < from: CT Head No Cont (05.03.21 @ 19:04) >     EXAM:  CT BRAIN                          PROCEDURE DATE:  05/03/2021          INTERPRETATION:  .    CLINICAL INFORMATION: Intracranial hemorrhage. Follow up    TECHNIQUE: Multiple axial CT images of the head were obtained without contrast. Sagittal and coronal reconstructed images were acquired from the source data.    COMPARISON: No prior CT studies of the brain are available for comparison at this institution.    FINDINGS: An evolving left-sided PCA distribution infarction is again seen withareas of gyral hyperattenuation. Findings may indicate superimposed petechial hemorrhagic transformation versus cortical laminar necrosis versus a combination of both.    No new areas of acute intracranial hemorrhage are seen.    Multiple small chronic infarcts are noted within the right cerebellar hemisphere. There is no hydrocephalus.    There is diffuse cerebral volume loss with prominence of the sulci, fissures, and cisternal spaces which is normal for the patient's age. There is mild periventricular white matter hypoattenuation statistically compatible with microvascular changes given calcific atherosclerotic disease of the intracranial arteries.    The paranasal sinuses and mastoid air cells are clear. The calvarium appears intact. The orbits appear unremarkable apart from cataract removal.    IMPRESSION: Stable follow-up CT examination.    < end of copied text >            CAPRINI SCORE [CLOT]:  Patient has an estimated Caprini score of greater than 5.  However, the patient's unique clinical situation will be addressed in an individual manner to determine appropriate anticoagulation treatment, if any.      HOSPITAL COURSE:  4/28 Transfer from [facility] found with evolving left PCA infarct with heme conversion. Admit to NSICU. Coded, ROSC x1 round of CPR. Fem alpesh placed, RIJ TLC. Self extubated--> Reintubated. echo: ef 45-50%. enlarged LA, increased LA pressure   4/29 AVF not functioning. Femoral shiley placed for HD. Arterial duplex for LUE AVF: patent with low flow. thrombosis of cephalic vein. Vascular consulted.: fistulogram with possible intervention next week  5/1 EEG: Multifocal potential epileptogenic foci, most prevalent in the left temporal and posterior quadrant regions. No seizures EEG d/c'd. Neurologic exam improving. Patient extubated.  5/2 NGT placed.  5/3 levophed gtt weaned off. Midodrine dose decreased. HD -1L.   5/4 stable for downgrade to hospitalist service to telemetry unit. Passed swallow eval, ordered for Pureed diet. SQH heparin for DVT prophylaxis initiated. cardiology consulted for recent stroke on aspirin, pAF, CAD hx with stents, low ef. Cardiology recommends ACE/ARB and BB when off midodrine and BP more stable. THey will contact EP for watchman given patient is not a good AC candidate. Neurology- Dr. Cedeño group consulted to follow. Vascular called regarding potential fistulogram with intervention. They will speak with attending tonight.     ASSESSMENT  71yF PMH incl. HTN, CAD s/p MI with h/o PCI 2007, HFrEf LV EF 45% + mild syst RV dysfunction, DM2 with diabetic neuropathy, ESRD on HD MWF 5/2018, chronic pancreatitis, paroxysmal afib 2018.  Had recent admission for pneumonia with hospital course complicated with L PCA CVA discharged on Plavix (not ACT as patient was high risk for bleed). Now admitted with hemorrhagic conversion in L PCA.    PLAN:  - Serial images reviewed with Dr Borrero  - [facility] 5/3 stable and was restarted on ASA and Heparin for DVT proph   - Patient extubated 5/1/21  - No indication for neurosurgical intervention  - Further management per NSICU  - Please recall as needed    I attest that this patient was signed out to the medicine service to Dr. Espinal on 5/4/21 at 1700. Patient's relevant past medical history, HPI, hospital course and post-operative course discussed in detail with accepting provider. An organ system-based sign out was given to accepting provider and all questions answered.  Please see the accepting service's acceptance note regarding their specific plan of care.

## 2021-05-04 NOTE — PROGRESS NOTE ADULT - SUBJECTIVE AND OBJECTIVE BOX
70 y/o female with PMHx CAD s/p stents '07, HTN, MI, PAF, liver abscess, s/p biliary stent with removal (11/2018; 7/2/19), chronic pancreatitis, DM2 on insulin, diabetic neuropathy, ESRD (5/2018) on HD (M/W/F ) presents to [facility] as a transfer from [place] after she went to HD today with and had  an episode of syncope and change in mental status. Patient had HD around 11am4/28/21  and 30 mins into HD patient vomited and had generalized weakness. HD was stopped, patient was awake and alert, and sepsis work up was done, WBC count 21K. Patient received 1st dose of vancomycin and RN reported she became unresponsive, L gaze preference, and was perseverating. Patient went for emergent CTH which revealed evolving L posterior cerebral artery infarct with new hemorrhage. Patient was subsequently intubated due to concern for deteriorating. Patient was then transferred to [facility].   Of note, patient was recently admitted to [place] on 4/12/21 for hypoxic/hypercapnic resp failure and hyperkalemia secondary to ESRD. While admitted pt was found to be confused, MRI obtained revealed large acute infarct of the left PCA. Pt was originally on Asprin which was changed to Plavix. Patient was discharged to rehab on Plavix.  Neurosurgery/NSICU team evaluated patient at bedside, patient unable to provide further history due to mental status/intubation.  (28 Apr 2021 17:33)  At 1030 pm 4/28/21- PEA arrest and CPR 30secs.  Extubated 5/1/21.    Overnight events  - none reported. Off Precedex.  VS, labs, imaging reviewed.    PHYSICAL EXAM:  Constitutional: No Acute Distress but barely cooperates with exam,   participated in exam  Neurological:  oriented to name only, follows some commands refuses others, pupils 2mm /3mm  R/L reactive.  Motor exam: noted to move spont but refused motor exam.  Sensation:   [ X] decreased: - Hx of neuropathy  Pulmonary: Clear to Auscultation, No rales, No rhonchi, No wheezes   Cardiovascular: S1, S2 present  Gastrointestinal: Soft, NT

## 2021-05-04 NOTE — CONSULT NOTE ADULT - ASSESSMENT
72 y/o female with PMHx CAD s/p stents '07, HTN, MI, PAF, liver abscess, s/p biliary stent with removal (11/2018; 7/2/19), chronic pancreatitis, DM2 on insulin, diabetic neuropathy, ESRD (5/2018) on HD (M/W/F since 5/18) presents to [facility] as a transfer from [place] after she went to HD today with and had  an episode of syncope and change in mental status. Patient had HD around 11am today, and 30 mins into HD patient vomited and had generalized weakness. HD was stopped, patient was awake and alert, and sepsis work up was done, WBC count 21K. Patient received 1st dose of vancomycin and RN reported she became unresponsive, L gaze preference, and was perseverating. Patient went for emergent CTH which revealed evolving L posterior cerebral artery infarct with new hemorrhage. Patient was subsequently intubated due to concern for deteriorating. Patient was then transferred to [facility].     Of note, patient was recently admitted to [place] on 4/12/21 for hypoxic/hypercapnic resp failure and hyperkalemia secondary to ESRD. While admitted pt was found to be confused, MRI obtained revealed large acute infarct of the left PCA. Pt was originally on Asprin which was changed to Plavix. Patient was discharged to rehab on Plavix.    Neurosurgery/NSICU team evaluated patient at bedside, patient unable to provide further history due to mental status/intubation.  (28 Apr 2021 17:33)    Above Appreciated  Cardiology consult requested for evaluation of HF, AC recommendations. Pt poor historian d/t confusion. Pt daughter at the bedside.     HFrEF 45-50%  -Echo-EF 45-50%, RV size is normal, mildly reduced RV sys. fx., no significant valvular abnormality, no evidence of pericardial effusion, no cardiac mass, vegetations, thrombus, or shunts visualized  -Pt BP low, unable to restart home doses of HF meds  -If BP tolerates, start low dose BB and lisinopril    pAfib  -Tele-NSR with RBBB with PACs HR @ 70s  -CT Head- revealed evolving L posterior cerebral artery infarct with new hemorrhage  -EP evaluation for AC recommendations, and Watchman evaluation      CAD  -s/p cath 2007 stentx1  -Cont statin, ASA, and BB use 70 y/o female with PMHx CAD s/p stents '07, HTN, MI, PAF, liver abscess, s/p biliary stent with removal (11/2018; 7/2/19), chronic pancreatitis, DM2 on insulin, diabetic neuropathy, ESRD (5/2018) on HD (M/W/F since 5/18) presents to [facility] as a transfer from [place] after she went to HD today with and had  an episode of syncope and change in mental status. Patient had HD around 11am today, and 30 mins into HD patient vomited and had generalized weakness. HD was stopped, patient was awake and alert, and sepsis work up was done, WBC count 21K. Patient received 1st dose of vancomycin and RN reported she became unresponsive, L gaze preference, and was perseverating. Patient went for emergent CTH which revealed evolving L posterior cerebral artery infarct with new hemorrhage. Patient was subsequently intubated due to concern for deteriorating. Patient was then transferred to [facility].     Of note, patient was recently admitted to [place] on 4/12/21 for hypoxic/hypercapnic resp failure and hyperkalemia secondary to ESRD. While admitted pt was found to be confused, MRI obtained revealed large acute infarct of the left PCA. Pt was originally on Asprin which was changed to Plavix. Patient was discharged to rehab on Plavix.    Neurosurgery/NSICU team evaluated patient at bedside, patient unable to provide further history due to mental status/intubation.  (28 Apr 2021 17:33)    Above Appreciated  Cardiology consult requested for evaluation of HF, AC recommendations. Pt poor historian d/t confusion. Pt daughter at the bedside.     HFrEF 45-50%  -Echo-EF 45-50%, RV size is normal, mildly reduced RV sys. fx., no significant valvular abnormality, no evidence of pericardial effusion, no cardiac mass, vegetations, thrombus, or shunts visualized  -Pt BP low, unable to restart home doses of HF meds  -If BP tolerates, start low dose BB and lisinopril    pAfib  -Tele-NSR with RBBB with PACs HR @ 70s  -CT Head- revealed evolving L posterior cerebral artery infarct with new hemorrhage  -EP evaluation for AC recommendations, and Watchman evaluation      CAD- No symptoms of angina  -s/p cath 2007 stentx1  -Cont statin, ASA, and BB use

## 2021-05-04 NOTE — PROGRESS NOTE ADULT - SUBJECTIVE AND OBJECTIVE BOX
HPI:  72 y/o female with PMHx CAD s/p stents '07, HTN, MI, PAF, liver abscess, s/p biliary stent with removal (11/2018; 7/2/19), chronic pancreatitis, DM2 on insulin, diabetic neuropathy, ESRD (5/2018) on HD (M/W/F since 5/18) presents to [facility] as a transfer from [place] after she went to HD today with and had  an episode of syncope and change in mental status. Patient had HD around 11am today, and 30 mins into HD patient vomited and had generalized weakness. HD was stopped, patient was awake and alert, and sepsis work up was done, WBC count 21K. Patient received 1st dose of vancomycin and RN reported she became unresponsive, L gaze preference, and was perseverating. Patient went for emergent CTH which revealed evolving L posterior cerebral artery infarct with new hemorrhage. Patient was subsequently intubated due to concern for deteriorating. Patient was then transferred to [facility].     Of note, patient was recently admitted to [place] on 4/12/21 for hypoxic/hypercapnic resp failure and hyperkalemia secondary to ESRD. While admitted pt was found to be confused, MRI obtained revealed large acute infarct of the left PCA. Pt was originally on Asprin which was changed to Plavix. Patient was discharged to rehab on Plavix.    Neurosurgery/NSICU team evaluated patient at bedside, patient unable to provide further history due to mental status/intubation.  (28 Apr 2021 17:33)    INTERVAL HPI/OVERNIGHT EVENTS:  Patient seen/examined by neurosurgery team. Patient had CTH yesterday which was stable and was restarted on ASA/subcut Heparin.     Vital Signs Last 24 Hrs  T(C): 36.6 (04 May 2021 12:00), Max: 36.9 (03 May 2021 16:40)  T(F): 97.9 (04 May 2021 12:00), Max: 98.5 (03 May 2021 16:40)  HR: 80 (04 May 2021 12:00) (54 - 112)  BP: 103/53 (04 May 2021 12:00) (87/70 - 122/58)  BP(mean): 64 (04 May 2021 12:00) (63 - 90)  RR: 18 (04 May 2021 12:00) (14 - 22)  SpO2: 97% (04 May 2021 12:00) (95% - 100%)    PHYSICAL EXAM:  Constitutional: NAD, uncooperative  Neurological:  A & O X 1, intermittent command following, pupils 2mm /3mm, reactive.  Motor exam: ISSA spontaneously.  Sensation:   [ X] decreased: - Hx of neuropathy  Pulmonary: Clear to Auscultation, No rales, No rhonchi, No wheezes   Cardiovascular: S1, S2 present  Gastrointestinal: Soft, NT      LABS:                        8.8    9.77  )-----------( 191      ( 04 May 2021 04:46 )             28.8     05-04    140  |  103  |  41.0<H>  ----------------------------<  131<H>  4.7   |  26.0  |  5.86<H>    Ca    8.6      04 May 2021 04:46  Phos  4.8     05-04  Mg     2.1     05-04 05-03 @ 07:01  -  05-04 @ 07:00  --------------------------------------------------------  IN: 1375.8 mL / OUT: 1120 mL / NET: 255.8 mL    05-04 @ 07:01  -  05-04 @ 13:04  --------------------------------------------------------  IN: 20 mL / OUT: 0 mL / NET: 20 mL      RADIOLOGY & ADDITIONAL TESTS:    < from: CT Head No Cont (05.03.21 @ 19:04) >  IMPRESSION: Stable follow-up CT examination.    < end of copied text >

## 2021-05-04 NOTE — PROGRESS NOTE ADULT - ASSESSMENT
71yF PMH incl. HTN, CAD s/p MI with h/o PCI 2007, HFrEf LV EF 45% + mild syst RV dysfunction, DM2 with diabetic neuropathy, ESRD on HD MWF 5/2018, chronic pancreatitis, paroxysmal afib 2018.  Had recent admission for pneumonia with hospital course complicated with L PCA CVA discharged on Plavix (not ACT as patient was high risk for bleed). Now admitted with hemorrhagic conversion in L PCA.    PLAN:  - Serial images reviewed with Dr Borrero  - CTH 5/3 stable and was restarted on ASA and Heparin for DVT proph   - Patient extubated 5/1/21  - No indication for neurosurgical intervention  - Further management per NSICU  - Please recall as needed

## 2021-05-04 NOTE — PROGRESS NOTE ADULT - ASSESSMENT
#CVA - started on asa and hep sub q by neurosurgery team  - -q4hr neurochecks  - possible initiation of AC later - reassess in 1wk in view of large area of stroke with recent conversion as per neurosurgery    #esrd - renal and vascular following  - cw hd through femoral line  - may need eventual fistulogram     #chf /afib/cad- cardio consult  - c.w tele   - rate controlled    #hypotension - midodrine     #acute encephalopathy - secondary to cva  - supportive care    #dysphagia - pureed    prognosis guarded  pallaitve consult   pt/ot/pmr placed

## 2021-05-04 NOTE — SWALLOW BEDSIDE ASSESSMENT ADULT - COMMENTS
Declining honey thick liquid trials despite max efforts 1st Trimester Sonogram/20 Week Level II Sonogram/Non Invasive Prenatal Screen (NIPS)/Fetal Non-Stress Test (NST)

## 2021-05-04 NOTE — PHYSICAL THERAPY INITIAL EVALUATION ADULT - ADDITIONAL COMMENTS
as per medical chart: was at subacute rehab prior to arrival. Pt lives with her spouse and family in a house, + chair lift. Pt ambulates independently with RW and is mostly independent with ADLs. Pt goes to dialysis MWF. Information obtained from [facility] 2/20.

## 2021-05-04 NOTE — PHYSICAL THERAPY INITIAL EVALUATION ADULT - GENERAL OBSERVATIONS, REHAB EVAL
pt received semi chavez position in bed, NAD, agreeable to PT,RN Carloz present to assist in communication

## 2021-05-04 NOTE — PROGRESS NOTE ADULT - SUBJECTIVE AND OBJECTIVE BOX
IRENE TAYLOR    [id number]    71y      Female    INTERVAL HPI/OVERNIGHT EVENTS:    Patient is a 70 y/o female with PMHx CAD s/p stents '07, HTN, MI, PAF, liver abscess, s/p biliary stent with removal (11/2018; 7/2/19), chronic pancreatitis, DM2 on insulin, diabetic neuropathy, ESRD (5/2018) on HD (M/W/F since 5/18) presents to [facility] as a transfer from [place] after she went to HD with and had  an episode of syncope and change in mental status.. HD RN reported she became unresponsive, L gaze preference, and was perseverating. Patient went for emergent CTH which revealed evolving L posterior cerebral artery infarct with new hemorrhage. Patient was subsequently intubated due to concern for deteriorating. Patient was then transferred to [facility].     Of note, patient was recently admitted to [place] on 4/12/21 for hypoxic/hypercapnic resp failure and hyperkalemia secondary to ESRD. While admitted pt was found to be confused, MRI obtained revealed large acute infarct of the left PCA. Pt was originally on Asprin which was changed to Plavix. Patient was discharged to rehab on Plavix.    Patient brought to NSICU and seen by nephro and continued on HD. Patient was extubated on 5/1. Patient seen by vascular for low flow out of the fistula and had a left femoral placed for HD. Patient also had tte      Summary:   1. Technically suboptimal study.   2. Mildly enlarged left atrium.   3. Left ventricular ejection fraction, by visual estimation, is 45 to 50%.    Patient is now being downgraded to medicine. Patient seen at bedside and is confused and agitated. RN at bedside who speaks annamaria and states she is confused      REVIEW OF SYSTEMS:    CONSTITUTIONAL: No fever, weight loss, or fatigue  RESPIRATORY: No cough, wheezing, hemoptysis; No shortness of breath  CARDIOVASCULAR: No chest pain, palpitations  GASTROINTESTINAL: No abdominal or epigastric pain. No nausea, vomiting  NEUROLOGICAL: No headaches, memory loss, loss of strength.  MISCELLANEOUS:      Vital Signs Last 24 Hrs  T(C): 37.1 (04 May 2021 18:32), Max: 37.1 (04 May 2021 18:32)  T(F): 98.7 (04 May 2021 18:32), Max: 98.7 (04 May 2021 18:32)  HR: 81 (04 May 2021 18:32) (54 - 112)  BP: 124/85 (04 May 2021 18:32) (88/51 - 125/94)  BP(mean): 103 (04 May 2021 18:00) (63 - 103)  RR: 18 (04 May 2021 18:32) (14 - 22)  SpO2: 98% (04 May 2021 18:32) (78% - 100%)    PHYSICAL EXAM:    GENERAL: agitated,   HEENT: PERRL, +EOMI  NECK: soft, Supple, No JVD,   CHEST/LUNG: Clear to auscultation bilaterally; No wheezing  HEART: S1S2+,   ABDOMEN: Soft, Nontender, Nondistended; Bowel sounds present  EXTREMITIES:  left femroal line   SKIN: No rashes or lesions  NEURO: AAOX1, follows commands      LABS:                        8.8    9.77  )-----------( 191      ( 04 May 2021 04:46 )             28.8     05-04    140  |  103  |  41.0<H>  ----------------------------<  131<H>  4.7   |  26.0  |  5.86<H>    Ca    8.6      04 May 2021 04:46  Phos  4.8     05-04  Mg     2.1     05-04        MEDICATIONS  (STANDING):  aspirin  chewable 81 milliGRAM(s) Enteral Tube daily  atorvastatin 40 milliGRAM(s) Oral at bedtime  chlorhexidine 2% Cloths 1 Application(s) Topical daily  dextrose 40% Gel 15 Gram(s) Oral once  dextrose 5%. 1000 milliLiter(s) (50 mL/Hr) IV Continuous <Continuous>  dextrose 5%. 1000 milliLiter(s) (100 mL/Hr) IV Continuous <Continuous>  dextrose 50% Injectable 25 Gram(s) IV Push once  dextrose 50% Injectable 12.5 Gram(s) IV Push once  epoetin analilia-epbx (RETACRIT) Injectable 00106 Unit(s) IV Push <User Schedule>  glucagon  Injectable 1 milliGRAM(s) IntraMuscular once  heparin   Injectable 5000 Unit(s) SubCutaneous every 8 hours  insulin lispro (ADMELOG) corrective regimen sliding scale   SubCutaneous every 6 hours  midodrine 2.5 milliGRAM(s) Oral <User Schedule>  Nephro-debbie 1 Tablet(s) Oral daily  pantoprazole   Suspension 40 milliGRAM(s) Oral daily  polyethylene glycol 3350 17 Gram(s) Oral daily  senna 2 Tablet(s) Oral at bedtime  valproate sodium IVPB 500 milliGRAM(s) IV Intermittent every 12 hours    MEDICATIONS  (PRN):  acetaminophen    Suspension .. 650 milliGRAM(s) Oral every 6 hours PRN Temp greater or equal to 38C (100.4F), Mild Pain (1 - 3)  midodrine 2.5 milliGRAM(s) Oral <User Schedule> PRN 15 minutes prior to dialysis if MAP < 65      RADIOLOGY & ADDITIONAL TESTS:

## 2021-05-04 NOTE — PROGRESS NOTE ADULT - SUBJECTIVE AND OBJECTIVE BOX
[facility] DIVISION OF KIDNEY DISEASES AND HYPERTENSION -- FOLLOW UP NOTE  --------------------------------------------------------------------------------  Chief Complaint: ESRD HD    Current: seen/examined in [facility] this AM  HD in AM        PAST HISTORY  --------------------------------------------------------------------------------  No significant changes to PMH, PSH, FHx, SHx, unless otherwise noted    ALLERGIES & MEDICATIONS  --------------------------------------------------------------------------------  Allergies    ertapenem (Urticaria)  Purell (Rash)    Intolerances      Standing Inpatient Medications  aspirin  chewable 81 milliGRAM(s) Enteral Tube daily  atorvastatin 10 milliGRAM(s) Oral at bedtime  chlorhexidine 2% Cloths 1 Application(s) Topical daily  dextrose 40% Gel 15 Gram(s) Oral once  dextrose 5%. 1000 milliLiter(s) IV Continuous <Continuous>  dextrose 5%. 1000 milliLiter(s) IV Continuous <Continuous>  dextrose 50% Injectable 25 Gram(s) IV Push once  dextrose 50% Injectable 12.5 Gram(s) IV Push once  dextrose 50% Injectable 25 Gram(s) IV Push once  epoetin analilia-epbx (RETACRIT) Injectable 80099 Unit(s) IV Push <User Schedule>  glucagon  Injectable 1 milliGRAM(s) IntraMuscular once  heparin   Injectable 5000 Unit(s) SubCutaneous every 8 hours  insulin lispro (ADMELOG) corrective regimen sliding scale   SubCutaneous every 6 hours  midodrine 2.5 milliGRAM(s) Oral <User Schedule>  Nephro-debbie 1 Tablet(s) Oral daily  pantoprazole   Suspension 40 milliGRAM(s) Oral daily  polyethylene glycol 3350 17 Gram(s) Oral daily  senna 2 Tablet(s) Oral at bedtime  valproate sodium IVPB 500 milliGRAM(s) IV Intermittent every 12 hours    PRN Inpatient Medications  acetaminophen    Suspension .. 650 milliGRAM(s) Oral every 6 hours PRN  midodrine 2.5 milliGRAM(s) Oral <User Schedule> PRN      REVIEW OF SYSTEMS  --------------------------------------------------------------------------------  Gen: No weight changes, fatigue, fevers/chills, weakness  Skin: No rashes  Head/Eyes/Ears/Mouth: No headache; Normal hearing; Normal vision w/o blurriness; No sinus pain/discomfort, sore throat  Respiratory: No dyspnea, cough, wheezing, hemoptysis  CV: No chest pain, PND, orthopnea  GI: No abdominal pain, diarrhea, constipation, nausea, vomiting, melena, hematochezia  : No increased frequency, dysuria, hematuria, nocturia  MSK: No joint pain/swelling; no back pain; no edema  Neuro: No dizziness/lightheadedness, weakness, seizures, numbness, tingling  Heme: No easy bruising or bleeding  Endo: No heat/cold intolerance  Psych: No significant nervousness, anxiety, stress, depression    All other systems were reviewed and are negative, except as noted.    VITALS/PHYSICAL EXAM  --------------------------------------------------------------------------------  T(C): 36.6 (05-04-21 @ 12:00), Max: 36.9 (05-03-21 @ 16:40)  HR: 80 (05-04-21 @ 12:00) (54 - 112)  BP: 103/53 (05-04-21 @ 12:00) (87/70 - 122/58)  RR: 18 (05-04-21 @ 12:00) (14 - 22)  SpO2: 97% (05-04-21 @ 12:00) (95% - 100%)  Wt(kg): --        05-03-21 @ 07:01  -  05-04-21 @ 07:00  --------------------------------------------------------  IN: 1375.8 mL / OUT: 1120 mL / NET: 255.8 mL    05-04-21 @ 07:01  -  05-04-21 @ 13:05  --------------------------------------------------------  IN: 20 mL / OUT: 0 mL / NET: 20 mL      Physical Exam:  	Gen: NAD,   	HEENT: PERRL, supple neck, clear oropharynx  	Pulm: CTA B/L  	CV: RRR, S1S2; no rub  	Back: No spinal or CVA tenderness; no sacral edema  	Abd: +BS, soft, nontender/nondistended  	: No suprapubic tenderness  	UE: Warm, FROM, no clubbing, intact strength; no edema; no asterixis  	LE: Warm, FROM, no clubbing, intact strength; no edema  	Neuro: No focal deficits, intact gait  	Psych: Normal affect and mood  	Skin: Warm, without rashes  	Vascular access:    LABS/STUDIES  --------------------------------------------------------------------------------              8.8    9.77  >-----------<  191      [05-04-21 @ 04:46]              28.8     140  |  103  |  41.0  ----------------------------<  131      [05-04-21 @ 04:46]  4.7   |  26.0  |  5.86        Ca     8.6     [05-04-21 @ 04:46]      Mg     2.1     [05-04-21 @ 04:46]      Phos  4.8     [05-04-21 @ 04:46]            Creatinine Trend:  SCr 5.86 [05-04 @ 04:46]  SCr 8.37 [05-03 @ 04:25]  SCr 7.29 [05-02 @ 04:24]  SCr 5.10 [05-01 @ 04:14]  SCr 7.17 [04-30 @ 04:55]        HbA1c 9.4      [02-07-20 @ 21:14]  TSH 1.15      [04-14-21 @ 08:13]  Lipid: chol 106, , HDL 40, LDL --      [04-30-21 @ 04:55]    HBsAb <3.0      [04-13-21 @ 02:54]  HBsAg Nonreact      [04-13-21 @ 02:54]  HCV 0.22, Nonreact      [04-13-21 @ 02:54]

## 2021-05-04 NOTE — PROGRESS NOTE ADULT - ASSESSMENT
71yF with new hemorrhagic conversion in L PCA territory.  Recent admission for pneumonia with hospital course complicated with L PCA CVA discharged on Plavix (not ACT as patient was high risk for bleed).with hemorrhagic conversion in L PCA.  Extensive PMH incl. HTN, CAD s/p MI with h/o PCI 2007, HFrEf LV EF 45% + mild syst RV dysfunction, DM2 with diabetic neuropathy, ESRD on HD MWF 5/2018, chronic pancreatitis, paroxysmal afib 2018.    Plan:  -q4hr neurochecks  -stable Cth findings - restarted on ASA 81, start SQH 5000 q8hr for DVT ppx  -high CHADSVAsc - possible initiation of AC later - reassess in 1wk in view of large area of stroke with recent heme conversion  -Valproic acid 500mg q 12hr, cont (reported sz-like activity on admission); VPA level in am  - Activity: [] mobilize as tolerated, PT/OT  -SpO2 goal > 92%  - SBP goal 100-150, MAP > 65   - Lipid LDL 41 from 4/14, restart low dose statin  - Midodrine - 5mg q 12- weaned off neosynephrine, start tapering Midodrine 2.5 q 12 + PRN 15 min prior to HD if SBP   -ESRD - dialysis - M/W/F    - vascular to access L forearm fistula; --> Vascular involved, thrombosis of Cephalic outflow vein ; fistula is patent with low velocities  -diet as tolerated, passed S/S  - GI prophylaxis  [x] PPI   - Bowel regimen [] colace [x] senna , miralax   - Goal euglycemia (-180), A1C 8.7%  - VTE prophylaxis: [x] SCDs [x] chemoprophylaxis  -stable to be downgraded to the Telemetry floor  -Medicine and Neurolgy involvement would be appreciated  CODE STATUS:  [x] Full Code [] DNR [] DNI [] Palliative/Comfort Care    DISPOSITION:  [x] ICU, possible downgrade if stable CTh    [x] Patient is at high risk of neurologic deterioration/death due to: seizure, re-bleed, respiratory failure

## 2021-05-04 NOTE — CONSULT NOTE ADULT - SUBJECTIVE AND OBJECTIVE BOX
[place] CARDIOLOGY-[facility] Faculty Practice                                                               Office:  [address] [address]                                                              Telephone: [phone]. Fax:[phone]                                                                        CARDIOLOGY CONSULTATION NOTE                                                                                             Consult requested by:  Dr. Mijares  Reason for Consultation: Stroke  History obtained by: Patient and medical record   obtained: No    Chief complaint:    Patient is a 71y old  Female who presents with a chief complaint of CVA with hemorrhagic transformation (04 May 2021 13:04)        HPI:  70 y/o female with PMHx CAD s/p stents '07, HTN, MI, PAF, liver abscess, s/p biliary stent with removal (11/2018; 7/2/19), chronic pancreatitis, DM2 on insulin, diabetic neuropathy, ESRD (5/2018) on HD (M/W/F since 5/18) presents to [facility] as a transfer from [place] after she went to HD today with and had  an episode of syncope and change in mental status. Patient had HD around 11am today, and 30 mins into HD patient vomited and had generalized weakness. HD was stopped, patient was awake and alert, and sepsis work up was done, WBC count 21K. Patient received 1st dose of vancomycin and RN reported she became unresponsive, L gaze preference, and was perseverating. Patient went for emergent CTH which revealed evolving L posterior cerebral artery infarct with new hemorrhage. Patient was subsequently intubated due to concern for deteriorating. Patient was then transferred to [facility].     Of note, patient was recently admitted to [place] on 4/12/21 for hypoxic/hypercapnic resp failure and hyperkalemia secondary to ESRD. While admitted pt was found to be confused, MRI obtained revealed large acute infarct of the left PCA. Pt was originally on Asprin which was changed to Plavix. Patient was discharged to rehab on Plavix.    Neurosurgery/NSICU team evaluated patient at bedside, patient unable to provide further history due to mental status/intubation.  (28 Apr 2021 17:33)    Above Appreciated  Cardiology consult requested for evaluation of stroke.     Indication:  Requesting Provider:  Performing Provider:  RAJNI Date:  Procedure Date:  Procedure Time:  Procedure Site:  Confirmed with LIBIA Lab:  Consenting Party:      PRE-PROCEDURE QUESTIONS:  On Anticoagulation?   NO  Previous Endoscopy?  NO  Hx of CVA?  NO  Bariatric Surgery?  NO  Respiratory Tolerance?  NO  Intubated?  NO  Sleep Apnea?  NO  ETOH/ Recreational Drug use?  NO      RELATIVE CONTRAINDICATIONS:  Large Diaphragmatic hernia?  NO  Atlantoaxial disease of the neck?  NO  Severe cervical arthritis?  NO  Extensive Radiation to mediastinum?  NO  GI bleed?  NO  Dysphagia?  NO  Odynophagia?  NO      ABSOLUTE CONTRAINDICATIONS:  Esophageal Diverticulitis?  NO  Esophageal Strictures?  NO  Esophageal Ring?  NO  Esophageal Spasm?  NO  Esophageal Laceration?  NO      LABS:    See Below        REVIEW OF SYMPTOMS:     CONSTITUTIONAL: No fever, weight loss, or fatigue  ENMT:  No difficulty hearing, tinnitus, vertigo; No sinus or throat pain  NECK: No pain or stiffness  CARDIOVASCULAR: See HPI   RESPIRATORY: No Dyspnea on exertion, Shortness of breath, cough, wheezing  : No dysuria, no hematuria   GI: No dark color stool, no melena, no diarrhea, no constipation, no abdominal pain   NEURO: No headache, no dizziness, no slurred speech   MUSCULOSKELETAL: No joint pain or swelling; No muscle, back, or extremity pain  PSYCH: No agitation, no anxiety.    ALL OTHER REVIEW OF SYSTEMS ARE NEGATIVE.      PREVIOUS DIAGNOSTIC TESTING  ECHO FINDINGS: < from: TTE Echo Complete w/o Contrast w/ Doppler (04.29.21 @ 10:05) >  Summary:   1. Technicallysuboptimal study.   2. Mildly enlarged left atrium.   3. Left ventricular ejection fraction, by visual estimation, is 45 to 50%.   4. Elevated mean left atrial pressure.   5. Mildly enlarged right atrium.   6. The right ventricular size is normal. Mildly reduced RV systolic function.   7. No significant valvular abnormality.   8. There is no evidence of pericardial effusion.   9. No cardiac mass, vegetations, thrombus or shunts visualized. However, LIBIA is a more sensitive test to evaluate for these findings.    ALLERGIES: Allergies    ertapenem (Urticaria)  Purell (Rash)    Intolerances      PAST MEDICAL HISTORY  Diabetes    Hypertension    Myocardial infarction    Pneumonia    Hypertension    CRF (chronic renal failure)    Diabetes    CAD (coronary artery disease)    ESRD (end stage renal disease) on dialysis    Liver abscess    Acute urinary retention    Cholecystitis with cholangitis    Chronic pancreatitis    PAF (paroxysmal atrial fibrillation)    H/O diabetic neuropathy        PAST SURGICAL HISTORY  H/O: hysterectomy    History of biliary stent insertion    S/P arteriovenous (AV) fistula creation    Cataract        FAMILY HISTORY:  No pertinent family history in first degree relatives        SOCIAL HISTORY:  Denies smoking/alcohol/drugs  CIGARETTES:     ALCOHOL:  DRUGS:      CURRENT MEDICATIONS:  midodrine 2.5 milliGRAM(s) Oral <User Schedule>  midodrine 2.5 milliGRAM(s) Oral <User Schedule> PRN  valproate sodium IVPB  pantoprazole   Suspension  polyethylene glycol 3350  senna  aspirin  chewable  atorvastatin  chlorhexidine 2% Cloths  dextrose 40% Gel  dextrose 5%.  dextrose 5%.  dextrose 50% Injectable  dextrose 50% Injectable  epoetin analilia-epbx (RETACRIT) Injectable  glucagon  Injectable  heparin   Injectable  insulin lispro (ADMELOG) corrective regimen sliding scale  Nephro-debbie        HOME MEDICATIONS:    aspirin 81 mg oral tablet: 1 tab(s) orally once a day (29 Apr 2021 13:40)  atorvastatin 80 mg oral tablet: 1 tab(s) orally once a day (at bedtime) (29 Apr 2021 13:40)  Basaglar KwikPen 100 units/mL subcutaneous solution: 20 unit(s) subcutaneous once a day (at bedtime)    *As per patient and daughter in law, she takes 10units in the morning and then 10 units at night. (29 Apr 2021 13:40)  clopidogrel 75 mg oral tablet: 1 tab(s) orally once a day (29 Apr 2021 13:40)  gabapentin 300 mg oral capsule: 2 cap(s) orally 2 times a day (29 Apr 2021 13:40)  isosorbide mononitrate 60 mg oral tablet, extended release: 1 tab(s) orally once a day (in the morning) (29 Apr 2021 13:40)  Metoprolol Succinate ER 25 mg oral tablet, extended release: 0.5 tab(s) orally once a day (29 Apr 2021 13:40)  Nephro-Debbie oral tablet: 0.8 mg 1 tab(s) orally once a day (29 Apr 2021 13:40)  NovoLOG 100 units/mL injectable solution: 5 unit(s) injectable 3 times a day (29 Apr 2021 13:40)  Velphoro 2500 mg (500 mg elemental iron) oral tablet, chewable: 1 tab(s) orally 3 times a day (with meals)    *Patient typically takes BID as per daughter (29 Apr 2021 13:40)      Vital Signs Last 24 Hrs  T(C): 36.6 (04 May 2021 12:00), Max: 36.9 (03 May 2021 16:40)  T(F): 97.9 (04 May 2021 12:00), Max: 98.5 (03 May 2021 16:40)  HR: 86 (04 May 2021 16:00) (54 - 112)  BP: 97/83 (04 May 2021 16:00) (87/70 - 121/77)  BP(mean): 88 (04 May 2021 16:00) (63 - 90)  RR: 18 (04 May 2021 16:00) (14 - 22)  SpO2: 99% (04 May 2021 16:00) (78% - 100%)      PHYSICAL EXAM:  Constitutional: Comfortable . No acute distress.   HEENT: Atraumatic and normocephalic , neck is supple . no JVD. No carotid bruit. PEERL   CNS: A&Ox3. No focal deficits. EOMI.   Lymph Nodes: Cervical : Not palpable.  Respiratory: CTAB  Cardiovascular: S1S2 RRR. No murmur/rubs or gallop.  Gastrointestinal: Soft non-tender and non distended . +Bowel sounds. negative Duarte's sign.  Extremities: No edema.   Psychiatric: Calm . no agitation.  Skin: No skin rash/ulcers visualized to face, hands or feet.    Intake and output:   05-03 @ 07:01  -  05-04 @ 07:00  --------------------------------------------------------  IN: 1375.8 mL / OUT: 1120 mL / NET: 255.8 mL    05-04 @ 07:01  -  05-04 @ 16:15  --------------------------------------------------------  IN: 20 mL / OUT: 0 mL / NET: 20 mL        LABS:                        8.8    9.77  )-----------( 191      ( 04 May 2021 04:46 )             28.8     05-04    140  |  103  |  41.0<H>  ----------------------------<  131<H>  4.7   |  26.0  |  5.86<H>    Ca    8.6      04 May 2021 04:46  Phos  4.8     05-04  Mg     2.1     05-04        ;p-BNP=        INTERPRETATION OF TELEMETRY: NSR with RBBB with PACs HR @ 70s  ECG: NSR HR @ 82, bifasicular block    RADIOLOGY & ADDITIONAL STUDIES:    X-ray: < from: Xray Chest 1 View-PORTABLE IMMEDIATE (Xray Chest 1 View-PORTABLE IMMEDIATE .) (05.03.21 @ 00:42) >  IMPRESSION:  Lines and tubes in satisfactory position.    Improvement in effusion/atelectasis/consolidation at the left base        CT scan: < from: CT Head No Cont (05.03.21 @ 19:04) >  FINDINGS: An evolving left-sided PCA distribution infarction is again seen withareas of gyral hyperattenuation. Findings may indicate superimposed petechial hemorrhagic transformation versus cortical laminar necrosis versus a combination of both.    No new areas of acute intracranial hemorrhage are seen.    Multiple small chronic infarcts are noted within the right cerebellar hemisphere. There is no hydrocephalus.    There is diffuse cerebral volume loss with prominence of the sulci, fissures, and cisternal spaces which is normal for the patient's age. There is mild periventricular white matter hypoattenuation statistically compatible with microvascular changes given calcific atherosclerotic disease of the intracranial arteries.    The paranasal sinuses and mastoid air cells are clear. The calvarium appears intact. The orbits appear unremarkable apart from cataract removal.    IMPRESSION: Stable follow-up CT examination.        MRI: < from: MR Head No Cont (04.14.21 @ 16:01) >  IMPRESSION:    MRI brain:  -Large acute infarct involving the left PCA territory.  -Additional small infarct involving the right ventral deysi.  -No acute intracranial hemorrhage.    MRA brain:  -Occlusion ofthe proximal left PCA.                                                                               [place] CARDIOLOGY-[facility] Faculty [facility]                                                               Office:  [address] [address]                                                              Telephone: [phone]. Fax:[phone]                                                                        CARDIOLOGY CONSULTATION NOTE                                                                                             Consult requested by:  Dr. Mijares  Reason for Consultation: Stroke  History obtained by: Patient and medical record   obtained: No    Chief complaint:    Patient is a 71y old  Female who presents with a chief complaint of CVA with hemorrhagic transformation (04 May 2021 13:04)        HPI:  72 y/o female with PMHx CAD s/p stents '07, HTN, MI, PAF, liver abscess, s/p biliary stent with removal (11/2018; 7/2/19), chronic pancreatitis, DM2 on insulin, diabetic neuropathy, ESRD (5/2018) on HD (M/W/F since 5/18) presents to [facility] as a transfer from [place] after she went to HD today with and had  an episode of syncope and change in mental status. Patient had HD around 11am today, and 30 mins into HD patient vomited and had generalized weakness. HD was stopped, patient was awake and alert, and sepsis work up was done, WBC count 21K. Patient received 1st dose of vancomycin and RN reported she became unresponsive, L gaze preference, and was perseverating. Patient went for emergent CTH which revealed evolving L posterior cerebral artery infarct with new hemorrhage. Patient was subsequently intubated due to concern for deteriorating. Patient was then transferred to [facility].     Of note, patient was recently admitted to [place] on 4/12/21 for hypoxic/hypercapnic resp failure and hyperkalemia secondary to ESRD. While admitted pt was found to be confused, MRI obtained revealed large acute infarct of the left PCA. Pt was originally on Asprin which was changed to Plavix. Patient was discharged to rehab on Plavix.    Neurosurgery/NSICU team evaluated patient at bedside, patient unable to provide further history due to mental status/intubation.  (28 Apr 2021 17:33)    Above Appreciated  Cardiology consult requested for evaluation of HF, AC recommendations. Pt poor historian d/t confusion. Pt daughter at the bedside.       REVIEW OF SYMPTOMS:     CONSTITUTIONAL: No fever, weight loss, or fatigue  ENMT:  No difficulty hearing, tinnitus, vertigo; No sinus or throat pain  NECK: No pain or stiffness  CARDIOVASCULAR: See HPI   RESPIRATORY: No Dyspnea on exertion, Shortness of breath, cough, wheezing  : No dysuria, no hematuria   GI: No dark color stool, no melena, no diarrhea, no constipation, no abdominal pain   NEURO: No headache, no dizziness, no slurred speech   MUSCULOSKELETAL: No joint pain or swelling; No muscle, back, or extremity pain  PSYCH: No agitation, no anxiety.    ALL OTHER REVIEW OF SYSTEMS ARE NEGATIVE.      PREVIOUS DIAGNOSTIC TESTING  ECHO FINDINGS: < from: TTE Echo Complete w/o Contrast w/ Doppler (04.29.21 @ 10:05) >  Summary:   1. Technicallysuboptimal study.   2. Mildly enlarged left atrium.   3. Left ventricular ejection fraction, by visual estimation, is 45 to 50%.   4. Elevated mean left atrial pressure.   5. Mildly enlarged right atrium.   6. The right ventricular size is normal. Mildly reduced RV systolic function.   7. No significant valvular abnormality.   8. There is no evidence of pericardial effusion.   9. No cardiac mass, vegetations, thrombus or shunts visualized. However, LIBIA is a more sensitive test to evaluate for these findings.    ALLERGIES: Allergies    ertapenem (Urticaria)  Purell (Rash)    Intolerances      PAST MEDICAL HISTORY  Diabetes    Hypertension    Myocardial infarction    Pneumonia    Hypertension    CRF (chronic renal failure)    Diabetes    CAD (coronary artery disease)    ESRD (end stage renal disease) on dialysis    Liver abscess    Acute urinary retention    Cholecystitis with cholangitis    Chronic pancreatitis    PAF (paroxysmal atrial fibrillation)    H/O diabetic neuropathy        PAST SURGICAL HISTORY  H/O: hysterectomy    History of biliary stent insertion    S/P arteriovenous (AV) fistula creation    Cataract        FAMILY HISTORY:  No pertinent family history in first degree relatives        SOCIAL HISTORY:  Denies smoking/alcohol/drugs      CURRENT MEDICATIONS:  midodrine 2.5 milliGRAM(s) Oral <User Schedule>  midodrine 2.5 milliGRAM(s) Oral <User Schedule> PRN  valproate sodium IVPB  pantoprazole   Suspension  polyethylene glycol 3350  senna  aspirin  chewable  atorvastatin  chlorhexidine 2% Cloths  dextrose 40% Gel  dextrose 5%.  dextrose 5%.  dextrose 50% Injectable  dextrose 50% Injectable  epoetin analilia-epbx (RETACRIT) Injectable  glucagon  Injectable  heparin   Injectable  insulin lispro (ADMELOG) corrective regimen sliding scale  Nephro-debbie        HOME MEDICATIONS:    aspirin 81 mg oral tablet: 1 tab(s) orally once a day (29 Apr 2021 13:40)  atorvastatin 80 mg oral tablet: 1 tab(s) orally once a day (at bedtime) (29 Apr 2021 13:40)  Basaglar KwikPen 100 units/mL subcutaneous solution: 20 unit(s) subcutaneous once a day (at bedtime)    *As per patient and daughter in law, she takes 10units in the morning and then 10 units at night. (29 Apr 2021 13:40)  clopidogrel 75 mg oral tablet: 1 tab(s) orally once a day (29 Apr 2021 13:40)  gabapentin 300 mg oral capsule: 2 cap(s) orally 2 times a day (29 Apr 2021 13:40)  isosorbide mononitrate 60 mg oral tablet, extended release: 1 tab(s) orally once a day (in the morning) (29 Apr 2021 13:40)  Metoprolol Succinate ER 25 mg oral tablet, extended release: 0.5 tab(s) orally once a day (29 Apr 2021 13:40)  Nephro-Debbie oral tablet: 0.8 mg 1 tab(s) orally once a day (29 Apr 2021 13:40)  NovoLOG 100 units/mL injectable solution: 5 unit(s) injectable 3 times a day (29 Apr 2021 13:40)  Velphoro 2500 mg (500 mg elemental iron) oral tablet, chewable: 1 tab(s) orally 3 times a day (with meals)    *Patient typically takes BID as per daughter (29 Apr 2021 13:40)      Vital Signs Last 24 Hrs  T(C): 36.6 (04 May 2021 12:00), Max: 36.9 (03 May 2021 16:40)  T(F): 97.9 (04 May 2021 12:00), Max: 98.5 (03 May 2021 16:40)  HR: 86 (04 May 2021 16:00) (54 - 112)  BP: 97/83 (04 May 2021 16:00) (87/70 - 121/77)  BP(mean): 88 (04 May 2021 16:00) (63 - 90)  RR: 18 (04 May 2021 16:00) (14 - 22)  SpO2: 99% (04 May 2021 16:00) (78% - 100%)      PHYSICAL EXAM:  Constitutional: Comfortable . No acute distress.   HEENT: Atraumatic and normocephalic , neck is supple . no JVD. No carotid bruit. PEERL   CNS: A&Ox0. No focal deficits. EOMI.   Lymph Nodes: Cervical : Not palpable.  Respiratory: CTAB  Cardiovascular: S1S2 RRR. No murmur/rubs or gallop.  Gastrointestinal: Soft non-tender and non distended . +Bowel sounds. negative Duarte's sign.  Extremities: No edema   Psychiatric: Calm . no agitation.  Skin: No skin rash/ulcers visualized to face, hands or feet.    Intake and output:   05-03 @ 07:01  -  05-04 @ 07:00  --------------------------------------------------------  IN: 1375.8 mL / OUT: 1120 mL / NET: 255.8 mL    05-04 @ 07:01  -  05-04 @ 16:15  --------------------------------------------------------  IN: 20 mL / OUT: 0 mL / NET: 20 mL        LABS:                        8.8    9.77  )-----------( 191      ( 04 May 2021 04:46 )             28.8     05-04    140  |  103  |  41.0<H>  ----------------------------<  131<H>  4.7   |  26.0  |  5.86<H>    Ca    8.6      04 May 2021 04:46  Phos  4.8     05-04  Mg     2.1     05-04        ;p-BNP=        INTERPRETATION OF TELEMETRY: NSR with RBBB with PACs HR @ 70s  ECG: NSR HR @ 82, bifasicular block    RADIOLOGY & ADDITIONAL STUDIES:    X-ray: < from: Xray Chest 1 View-PORTABLE IMMEDIATE (Xray Chest 1 View-PORTABLE IMMEDIATE .) (05.03.21 @ 00:42) >  IMPRESSION:  Lines and tubes in satisfactory position.    Improvement in effusion/atelectasis/consolidation at the left base        CT scan: < from: CT Head No Cont (05.03.21 @ 19:04) >  FINDINGS: An evolving left-sided PCA distribution infarction is again seen withareas of gyral hyperattenuation. Findings may indicate superimposed petechial hemorrhagic transformation versus cortical laminar necrosis versus a combination of both.    No new areas of acute intracranial hemorrhage are seen.    Multiple small chronic infarcts are noted within the right cerebellar hemisphere. There is no hydrocephalus.    There is diffuse cerebral volume loss with prominence of the sulci, fissures, and cisternal spaces which is normal for the patient's age. There is mild periventricular white matter hypoattenuation statistically compatible with microvascular changes given calcific atherosclerotic disease of the intracranial arteries.    The paranasal sinuses and mastoid air cells are clear. The calvarium appears intact. The orbits appear unremarkable apart from cataract removal.    IMPRESSION: Stable follow-up CT examination.        MRI: < from: MR Head No Cont (04.14.21 @ 16:01) >  IMPRESSION:    MRI brain:  -Large acute infarct involving the left PCA territory.  -Additional small infarct involving the right ventral deysi.  -No acute intracranial hemorrhage.    MRA brain:  -Occlusion ofthe proximal left PCA.

## 2021-05-04 NOTE — PROGRESS NOTE ADULT - ASSESSMENT
1) ESRD on HD  2) MBD of renal dx  3) Anemia of renal dx  4) Vol HTN    HD in AM  On epogen 10k units TIW    Discussed in detail with neurosurgery

## 2021-05-05 DIAGNOSIS — R53.81 OTHER MALAISE: ICD-10-CM

## 2021-05-05 DIAGNOSIS — N18.6 END STAGE RENAL DISEASE: ICD-10-CM

## 2021-05-05 DIAGNOSIS — Z51.5 ENCOUNTER FOR PALLIATIVE CARE: ICD-10-CM

## 2021-05-05 DIAGNOSIS — I63.9 CEREBRAL INFARCTION, UNSPECIFIED: ICD-10-CM

## 2021-05-05 LAB
ALBUMIN SERPL ELPH-MCNC: 3 G/DL — LOW (ref 3.3–5.2)
ALBUMIN SERPL ELPH-MCNC: 3 G/DL — LOW (ref 3.3–5.2)
ANION GAP SERPL CALC-SCNC: 19 MMOL/L — HIGH (ref 5–17)
ANION GAP SERPL CALC-SCNC: 19 MMOL/L — HIGH (ref 5–17)
BUN SERPL-MCNC: 51 MG/DL — HIGH (ref 8–20)
BUN SERPL-MCNC: 51 MG/DL — HIGH (ref 8–20)
CALCIUM SERPL-MCNC: 8.6 MG/DL — SIGNIFICANT CHANGE UP (ref 8.6–10.2)
CALCIUM SERPL-MCNC: 8.8 MG/DL — SIGNIFICANT CHANGE UP (ref 8.6–10.2)
CHLORIDE SERPL-SCNC: 101 MMOL/L — SIGNIFICANT CHANGE UP (ref 98–107)
CHLORIDE SERPL-SCNC: 99 MMOL/L — SIGNIFICANT CHANGE UP (ref 98–107)
CO2 SERPL-SCNC: 21 MMOL/L — LOW (ref 22–29)
CO2 SERPL-SCNC: 23 MMOL/L — SIGNIFICANT CHANGE UP (ref 22–29)
CREAT SERPL-MCNC: 7.51 MG/DL — HIGH (ref 0.5–1.3)
CREAT SERPL-MCNC: 7.54 MG/DL — HIGH (ref 0.5–1.3)
CULTURE RESULTS: SIGNIFICANT CHANGE UP
GLUCOSE BLDC GLUCOMTR-MCNC: 134 MG/DL — HIGH (ref 70–99)
GLUCOSE BLDC GLUCOMTR-MCNC: 139 MG/DL — HIGH (ref 70–99)
GLUCOSE BLDC GLUCOMTR-MCNC: 157 MG/DL — HIGH (ref 70–99)
GLUCOSE SERPL-MCNC: 130 MG/DL — HIGH (ref 70–99)
GLUCOSE SERPL-MCNC: 132 MG/DL — HIGH (ref 70–99)
HCT VFR BLD CALC: 31 % — LOW (ref 34.5–45)
HCT VFR BLD CALC: 32.4 % — LOW (ref 34.5–45)
HGB BLD-MCNC: 9.2 G/DL — LOW (ref 11.5–15.5)
HGB BLD-MCNC: 9.3 G/DL — LOW (ref 11.5–15.5)
MAGNESIUM SERPL-MCNC: 2.3 MG/DL — SIGNIFICANT CHANGE UP (ref 1.6–2.6)
MCHC RBC-ENTMCNC: 28.5 PG — SIGNIFICANT CHANGE UP (ref 27–34)
MCHC RBC-ENTMCNC: 28.7 GM/DL — LOW (ref 32–36)
MCHC RBC-ENTMCNC: 28.8 PG — SIGNIFICANT CHANGE UP (ref 27–34)
MCHC RBC-ENTMCNC: 29.7 GM/DL — LOW (ref 32–36)
MCV RBC AUTO: 97.2 FL — SIGNIFICANT CHANGE UP (ref 80–100)
MCV RBC AUTO: 99.4 FL — SIGNIFICANT CHANGE UP (ref 80–100)
PHOSPHATE SERPL-MCNC: 5 MG/DL — HIGH (ref 2.4–4.7)
PHOSPHATE SERPL-MCNC: 5.2 MG/DL — HIGH (ref 2.4–4.7)
PLATELET # BLD AUTO: 236 K/UL — SIGNIFICANT CHANGE UP (ref 150–400)
PLATELET # BLD AUTO: 244 K/UL — SIGNIFICANT CHANGE UP (ref 150–400)
POTASSIUM SERPL-MCNC: 5.1 MMOL/L — SIGNIFICANT CHANGE UP (ref 3.5–5.3)
POTASSIUM SERPL-MCNC: 5.2 MMOL/L — SIGNIFICANT CHANGE UP (ref 3.5–5.3)
POTASSIUM SERPL-SCNC: 5.1 MMOL/L — SIGNIFICANT CHANGE UP (ref 3.5–5.3)
POTASSIUM SERPL-SCNC: 5.2 MMOL/L — SIGNIFICANT CHANGE UP (ref 3.5–5.3)
RBC # BLD: 3.19 M/UL — LOW (ref 3.8–5.2)
RBC # BLD: 3.26 M/UL — LOW (ref 3.8–5.2)
RBC # FLD: 14.6 % — HIGH (ref 10.3–14.5)
RBC # FLD: 14.7 % — HIGH (ref 10.3–14.5)
SODIUM SERPL-SCNC: 141 MMOL/L — SIGNIFICANT CHANGE UP (ref 135–145)
SODIUM SERPL-SCNC: 141 MMOL/L — SIGNIFICANT CHANGE UP (ref 135–145)
SPECIMEN SOURCE: SIGNIFICANT CHANGE UP
VALPROATE SERPL-MCNC: 12 UG/ML — LOW (ref 50–100)
WBC # BLD: 10.53 K/UL — HIGH (ref 3.8–10.5)
WBC # BLD: 11.42 K/UL — HIGH (ref 3.8–10.5)
WBC # FLD AUTO: 10.53 K/UL — HIGH (ref 3.8–10.5)
WBC # FLD AUTO: 11.42 K/UL — HIGH (ref 3.8–10.5)

## 2021-05-05 PROCEDURE — 99222 1ST HOSP IP/OBS MODERATE 55: CPT

## 2021-05-05 PROCEDURE — 99223 1ST HOSP IP/OBS HIGH 75: CPT

## 2021-05-05 PROCEDURE — 99497 ADVNCD CARE PLAN 30 MIN: CPT | Mod: 25

## 2021-05-05 PROCEDURE — 90937 HEMODIALYSIS REPEATED EVAL: CPT

## 2021-05-05 PROCEDURE — 99233 SBSQ HOSP IP/OBS HIGH 50: CPT

## 2021-05-05 RX ORDER — MIDODRINE HYDROCHLORIDE 2.5 MG/1
2.5 TABLET ORAL ONCE
Refills: 0 | Status: COMPLETED | OUTPATIENT
Start: 2021-05-05 | End: 2021-05-05

## 2021-05-05 RX ORDER — LISINOPRIL 2.5 MG/1
2.5 TABLET ORAL DAILY
Refills: 0 | Status: DISCONTINUED | OUTPATIENT
Start: 2021-05-05 | End: 2021-05-06

## 2021-05-05 RX ORDER — LISINOPRIL 2.5 MG/1
2.5 TABLET ORAL DAILY
Refills: 0 | Status: DISCONTINUED | OUTPATIENT
Start: 2021-05-05 | End: 2021-05-05

## 2021-05-05 RX ORDER — SODIUM CHLORIDE 9 MG/ML
500 INJECTION INTRAMUSCULAR; INTRAVENOUS; SUBCUTANEOUS ONCE
Refills: 0 | Status: COMPLETED | OUTPATIENT
Start: 2021-05-05 | End: 2021-05-05

## 2021-05-05 RX ADMIN — ERYTHROPOIETIN 10000 UNIT(S): 10000 INJECTION, SOLUTION INTRAVENOUS; SUBCUTANEOUS at 10:41

## 2021-05-05 RX ADMIN — MIDODRINE HYDROCHLORIDE 2.5 MILLIGRAM(S): 2.5 TABLET ORAL at 23:47

## 2021-05-05 RX ADMIN — SODIUM CHLORIDE 500 MILLILITER(S): 9 INJECTION INTRAMUSCULAR; INTRAVENOUS; SUBCUTANEOUS at 23:47

## 2021-05-05 RX ADMIN — MIDODRINE HYDROCHLORIDE 2.5 MILLIGRAM(S): 2.5 TABLET ORAL at 06:31

## 2021-05-05 RX ADMIN — HEPARIN SODIUM 5000 UNIT(S): 5000 INJECTION INTRAVENOUS; SUBCUTANEOUS at 13:50

## 2021-05-05 RX ADMIN — Medication 81 MILLIGRAM(S): at 12:33

## 2021-05-05 RX ADMIN — Medication 2: at 06:42

## 2021-05-05 RX ADMIN — Medication 27.5 MILLIGRAM(S): at 17:13

## 2021-05-05 RX ADMIN — HEPARIN SODIUM 5000 UNIT(S): 5000 INJECTION INTRAVENOUS; SUBCUTANEOUS at 06:29

## 2021-05-05 RX ADMIN — Medication 27.5 MILLIGRAM(S): at 06:29

## 2021-05-05 RX ADMIN — HEPARIN SODIUM 5000 UNIT(S): 5000 INJECTION INTRAVENOUS; SUBCUTANEOUS at 22:16

## 2021-05-05 RX ADMIN — MIDODRINE HYDROCHLORIDE 2.5 MILLIGRAM(S): 2.5 TABLET ORAL at 13:52

## 2021-05-05 RX ADMIN — PANTOPRAZOLE SODIUM 40 MILLIGRAM(S): 20 TABLET, DELAYED RELEASE ORAL at 12:33

## 2021-05-05 RX ADMIN — Medication 1 TABLET(S): at 12:33

## 2021-05-05 RX ADMIN — POLYETHYLENE GLYCOL 3350 17 GRAM(S): 17 POWDER, FOR SOLUTION ORAL at 12:33

## 2021-05-05 NOTE — CONSULT NOTE ADULT - ASSESSMENT
The patient is a 71y Female who is followed by neurology because of left PCA CVA with heme    Left PCA stroke with hemorrhagic conversion  This event occured a week ago  LDL=39  continue ASA and lipitor  maintain normotension  history of PAF, suggest follow up head CT 5/12/2021 and if stable can start anticoagulation.  PT/OT    discussed with Dr Bob.    will follow with you    Kenny Wilson MD PhD   [phone]

## 2021-05-05 NOTE — PROGRESS NOTE ADULT - SUBJECTIVE AND OBJECTIVE BOX
Patient was seen and evaluated on dialysis.   No c/o CP SOB NV  no F/C  + swelling    T(C): 36.5 (05-05-21 @ 09:00), Max: 37.2 (05-05-21 @ 05:12)  HR: 69 (05-05-21 @ 09:00) (69 - 130)  BP: 146/87 (05-05-21 @ 09:00) (95/83 - 146/87)  Wt(kg): -- NA  PE ;  NAD, Restless, Pale,   lungs - CTA  CV gr 1 murmur,  No gallop or rub  Abd : soft, NT BS +, No masses  Ext- + edema  Neuro : Grossly intact, moving extremities                         9.2    11.42 )-----------( 244      ( 05 May 2021 10:33 )             31.0     05-05    141  |  99  |  51.0<H>  ----------------------------<  132<H>  5.2   |  23.0  |  7.54<H>    Ca    8.6      05 May 2021 10:33  Phos  5.0     05-05  Mg     2.3     05-05    TPro  x   /  Alb  3.0<L>  /  TBili  x   /  DBili  x   /  AST  x   /  ALT  x   /  AlkPhos  x   05-05    MEDICATIONS  (STANDING):  acetaminophen    Suspension .. PRN  aspirin  chewable  atorvastatin  dextrose 40% Gel  dextrose 5%.  dextrose 5%.  dextrose 50% Injectable  dextrose 50% Injectable  epoetin analilia-epbx (RETACRIT) Injectable  glucagon  Injectable  heparin   Injectable  insulin lispro (ADMELOG) corrective regimen sliding scale  midodrine  midodrine PRN  Nephro-debbie  pantoprazole   Suspension  polyethylene glycol 3350  senna  valproate sodium IVPB    Patient stable  Avinash HD easily  Continue

## 2021-05-05 NOTE — PROGRESS NOTE ADULT - ASSESSMENT
A/P: 70 y/o F w/PMH CAD, DM, ESRD on HD, s/p cerebral hemorrhagic infarct, with LUE AVF occlusion at proximal cephalic outflow was admitted to NeuroICU now more stable and downgraded to floor. Had been Getting HD via R femoral Shiley which was no longer functioning and removed at bedside. Plan for fistulogram and possible temporary dialysis catheter placement.        - Likely placement of temporary dialysis catheter on 5/6  - Fistulogram planning with possible intervention, date TBD  - Remainder of care per primary.

## 2021-05-05 NOTE — PROGRESS NOTE ADULT - SUBJECTIVE AND OBJECTIVE BOX
IRENE TAYLOR    [id number]    71y      Female    INTERVAL HPI/OVERNIGHT EVENTS: patient being seen for cva and esrd. Patient is for hd today    REVIEW OF SYSTEMS:    unable to obtain secondary to mental status      Vital Signs Last 24 Hrs  T(C): 36.7 (05 May 2021 13:25), Max: 37.2 (05 May 2021 05:12)  T(F): 98 (05 May 2021 13:25), Max: 98.9 (05 May 2021 05:12)  HR: 102 (05 May 2021 13:25) (69 - 130)  BP: 126/71 (05 May 2021 13:25) (95/83 - 146/87)  BP(mean): 103 (04 May 2021 18:00) (88 - 103)  RR: 17 (05 May 2021 13:25) (17 - 19)  SpO2: 97% (05 May 2021 13:25) (78% - 99%)    PHYSICAL EXAM:  GENERAL: agitated,   HEENT: PERRL, +EOMI  NECK: soft, Supple, No JVD,   CHEST/LUNG: Clear to auscultation bilaterally; No wheezing  HEART: S1S2+,   ABDOMEN: Soft, Nontender, Nondistended; Bowel sounds present  EXTREMITIES:  left femroal line   SKIN: No rashes or lesions  NEURO: AAOX1, follows commands      LABS:                        9.2    11.42 )-----------( 244      ( 05 May 2021 10:33 )             31.0     05-05    141  |  99  |  51.0<H>  ----------------------------<  132<H>  5.2   |  23.0  |  7.54<H>    Ca    8.6      05 May 2021 10:33  Phos  5.0     05-05  Mg     2.3     05-05    TPro  x   /  Alb  3.0<L>  /  TBili  x   /  DBili  x   /  AST  x   /  ALT  x   /  AlkPhos  x   05-05      MEDICATIONS  (STANDING):  aspirin  chewable 81 milliGRAM(s) Enteral Tube daily  atorvastatin 40 milliGRAM(s) Oral at bedtime  dextrose 40% Gel 15 Gram(s) Oral once  dextrose 5%. 1000 milliLiter(s) (50 mL/Hr) IV Continuous <Continuous>  dextrose 5%. 1000 milliLiter(s) (100 mL/Hr) IV Continuous <Continuous>  dextrose 50% Injectable 25 Gram(s) IV Push once  dextrose 50% Injectable 12.5 Gram(s) IV Push once  epoetin analilia-epbx (RETACRIT) Injectable 06577 Unit(s) IV Push <User Schedule>  glucagon  Injectable 1 milliGRAM(s) IntraMuscular once  heparin   Injectable 5000 Unit(s) SubCutaneous every 8 hours  insulin lispro (ADMELOG) corrective regimen sliding scale   SubCutaneous every 6 hours  midodrine 2.5 milliGRAM(s) Oral <User Schedule>  Nephro-debbie 1 Tablet(s) Oral daily  pantoprazole   Suspension 40 milliGRAM(s) Oral daily  polyethylene glycol 3350 17 Gram(s) Oral daily  senna 2 Tablet(s) Oral at bedtime  valproate sodium IVPB 500 milliGRAM(s) IV Intermittent every 12 hours    MEDICATIONS  (PRN):  acetaminophen    Suspension .. 650 milliGRAM(s) Oral every 6 hours PRN Temp greater or equal to 38C (100.4F), Mild Pain (1 - 3)  midodrine 2.5 milliGRAM(s) Oral <User Schedule> PRN 15 minutes prior to dialysis if MAP < 65      RADIOLOGY & ADDITIONAL TESTS:

## 2021-05-05 NOTE — CONSULT NOTE ADULT - SUBJECTIVE AND OBJECTIVE BOX
Patient is a 71y old  Female who presents with a chief complaint of CVA with hemorrhagic transformation (05 May 2021 11:50)    HPI:  72 y/o female with PMHx CAD s/p stents '07, HTN, MI, PAF, liver abscess, s/p biliary stent with removal (11/2018; 7/2/19), chronic pancreatitis, DM2 on insulin, diabetic neuropathy, ESRD (5/2018) on HD (M/W/F since 5/18) presents to [facility] as a transfer from [place] after she went to HD today with and had  an episode of syncope and change in mental status. Patient had HD and 30 mins into HD patient vomited and had generalized weakness. HD was stopped, patient was awake and alert, and sepsis work up was done, WBC count 21K. Patient received 1st dose of vancomycin and RN reported she became unresponsive, L gaze preference, and was perseverating. Patient went for emergent CTH which revealed evolving L posterior cerebral artery infarct with new hemorrhage. Patient was subsequently intubated due to concern for deteriorating. Patient was then transferred to [facility].     Of note, patient was recently admitted to [place] on 4/12/21 for hypoxic/hypercapnic resp failure and hyperkalemia secondary to ESRD. While admitted pt was found to be confused, MRI obtained revealed large acute infarct of the left PCA. Pt was originally on Asprin which was changed to Plavix. Patient was discharged to rehab on Plavix.    On night of admission to [facility], had PEA arrest with ROSC after 30sec CPR and IVP Epi 1mg x1. Subsequently re-intubated and started on pressors for hypotension. Underwent placement of L groin Shiley on 4/29; vascular surgery suspect occlusion of left UE fistula, plan for fistulogram and intervention at some point. Patient was extubated on 5/1; repeat CT head scan have been stable, thus restarted on ASA and SQH on 5/3.     Patient was seen and examined at the bedside this afternoon; [employer] ID: [id number] provided Roberto translations. Patient thus far has been a poor historian and confused throughout admission, as per chart review. She inconsistently answered questions and did not follow commands. However, denied pain or discomfort.       Imaging showed:  < from: CT Head No Cont (04.28.21 @ 14:43) >  IMPRESSION:  Evolving left posterior cerebral artery territorial infarct with new hemorrhages predominantly in the cortical locations.  < end of copied text >    < from: CT Head No Cont (05.03.21 @ 19:04) >  IMPRESSION: Stable follow-up CT examination.  < end of copied text >    < from: TTE Echo Complete w/o Contrast w/ Doppler (04.29.21 @ 10:05) >  Summary:   1. Technically suboptimal study.   2. Mildly enlarged left atrium.   3. Left ventricular ejection fraction, by visual estimation, is 45 to 50%.   4. Elevated mean left atrial pressure.   5. Mildly enlarged right atrium.   6. The right ventricular size is normal. Mildly reduced RV systolic function.   7. No significant valvular abnormality.   8. There is no evidence of pericardial effusion.   9. No cardiac mass, vegetations, thrombus or shunts visualized. However, LIBIA is a more sensitive test to evaluate for these findings.  < end of copied text >      REVIEW OF SYSTEMS  Denied bodily pain or discomfort, but otherwise, ROS limited 2/2 patient's condition    VITALS  T(C): 36.7 (05-05-21 @ 13:25), Max: 37.2 (05-05-21 @ 05:12)  HR: 102 (05-05-21 @ 13:25) (69 - 130)  BP: 126/71 (05-05-21 @ 13:25) (95/83 - 146/87)  RR: 17 (05-05-21 @ 13:25) (17 - 19)  SpO2: 97% (05-05-21 @ 13:25) (78% - 99%)  Wt(kg): --    PAST MEDICAL & SURGICAL HISTORY  Diabetes  Hypertension  Myocardial infarction  Pneumonia  Hypertension  CRF (chronic renal failure)  Diabetes  CAD (coronary artery disease)  ESRD (end stage renal disease) on dialysis  Liver abscess  Acute urinary retention  Cholecystitis with cholangitis  Chronic pancreatitis  PAF (paroxysmal atrial fibrillation)  H/O diabetic neuropathy  H/O: hysterectomy  History of biliary stent insertion  S/P arteriovenous (AV) fistula creation  Cataract        SOCIAL HISTORY - as per documentation/history  Smoking - None  EtOH - None  Drugs - None    FUNCTIONAL HISTORY  Admitted from [facility]. Prior to initial hospitalization in April, was living private home with her spouse and family members; (+) stair lift at home)  Prior to hospitalization in April, was independent with ambulation w/RW and with most ADLs.    CURRENT FUNCTIONAL STATUS  Bed Mobility: Rolling/Turning:     · Level of [place]	moderate assist (50% patients effort)    Bed Mobility: Sit to Supine:     · Level of [place]	maximum assist (25% patients effort)  · Physical Assist/Nonphysical Assist	2 person assist    Bed Mobility: Supine to Sit:     · Level of [place]	maximum assist (25% patients effort)  · Physical Assist/Nonphysical Assist	2 person assist    Transfer: Sit to Stand:     · Level of [place]	maximum assist (25% patients effort)  · Physical Assist/Nonphysical Assist	2 person assist    Transfer: Stand to Sit:     · Level of [place]	maximum assist (25% patients effort)  · Physical Assist/Nonphysical Assist	2 person assist      FAMILY HISTORY   No pertinent family history in first degree relatives  No pertinent family history in first degree relatives      RECENT LABS/IMAGING  CBC Full  -  ( 05 May 2021 10:33 )  WBC Count : 11.42 K/uL  RBC Count : 3.19 M/uL  Hemoglobin : 9.2 g/dL  Hematocrit : 31.0 %  Platelet Count - Automated : 244 K/uL  Mean Cell Volume : 97.2 fl  Mean Cell Hemoglobin : 28.8 pg  Mean Cell Hemoglobin Concentration : 29.7 gm/dL  Auto Neutrophil # : x  Auto Lymphocyte # : x  Auto Monocyte # : x  Auto Eosinophil # : x  Auto Basophil # : x  Auto Neutrophil % : x  Auto Lymphocyte % : x  Auto Monocyte % : x  Auto Eosinophil % : x  Auto Basophil % : x    05-05    141  |  99  |  51.0<H>  ----------------------------<  132<H>  5.2   |  23.0  |  7.54<H>    Ca    8.6      05 May 2021 10:33  Phos  5.0     05-05  Mg     2.3     05-05    TPro  x   /  Alb  3.0<L>  /  TBili  x   /  DBili  x   /  AST  x   /  ALT  x   /  AlkPhos  x   05-05      ALLERGIES  ertapenem (Urticaria)  Purell (Rash)      MEDICATIONS   acetaminophen    Suspension .. 650 milliGRAM(s) Oral every 6 hours PRN  aspirin  chewable 81 milliGRAM(s) Enteral Tube daily  atorvastatin 40 milliGRAM(s) Oral at bedtime  dextrose 40% Gel 15 Gram(s) Oral once  dextrose 5%. 1000 milliLiter(s) IV Continuous <Continuous>  dextrose 5%. 1000 milliLiter(s) IV Continuous <Continuous>  dextrose 50% Injectable 25 Gram(s) IV Push once  dextrose 50% Injectable 12.5 Gram(s) IV Push once  epoetin analilia-epbx (RETACRIT) Injectable 33030 Unit(s) IV Push <User Schedule>  glucagon  Injectable 1 milliGRAM(s) IntraMuscular once  heparin   Injectable 5000 Unit(s) SubCutaneous every 8 hours  insulin lispro (ADMELOG) corrective regimen sliding scale   SubCutaneous every 6 hours  midodrine 2.5 milliGRAM(s) Oral <User Schedule>  midodrine 2.5 milliGRAM(s) Oral <User Schedule> PRN  Nephro-debbie 1 Tablet(s) Oral daily  pantoprazole   Suspension 40 milliGRAM(s) Oral daily  polyethylene glycol 3350 17 Gram(s) Oral daily  senna 2 Tablet(s) Oral at bedtime  valproate sodium IVPB 500 milliGRAM(s) IV Intermittent every 12 hours      ----------------------------------------------------------------------------------------  PHYSICAL EXAM  Constitutional - NAD, appeared restless in bed with restraints on both UEs  HEENT - NCAT, Left pupil appears more dilated than right; reactive to light b/l  Chest - Breathing comfortably on RA  Cardiovascular - warm and well perfused  Abdomen - Soft and nontender  Extremities - No pitting edema noted b/l   Neurologic Exam -                    Cognitive - Oriented to self, but not to place or time. Did not consistently answer questions appropriately.     Motor - Unable to assess, as patient was not following commands      Sensory -  Unable to assess, as patient was not following commands   ----------------------------------------------------------------------------------------   Patient is a 71y old  Female who presents with a chief complaint of CVA with hemorrhagic transformation (05 May 2021 11:50)    HPI:  70 y/o female with PMHx CAD s/p stents '07, HTN, MI, PAF, liver abscess, s/p biliary stent with removal (11/2018; 7/2/19), chronic pancreatitis, DM2 on insulin, diabetic neuropathy, ESRD (5/2018) on HD (M/W/F since 5/18) presents to [facility] as a transfer from [place] after she went to HD today with and had an episode of syncope and change in mental status. Patient had HD and 30 mins into HD patient vomited and had generalized weakness. HD was stopped, patient was awake and alert, and sepsis work up was done, WBC count 21K. Patient received 1st dose of vancomycin and RN reported she became unresponsive, L gaze preference, and was perseverating. Patient went for emergent CTH which revealed evolving L posterior cerebral artery infarct with new hemorrhage. Patient was subsequently intubated due to concern for deteriorating. Patient was then transferred to [facility].     Of note, patient was recently admitted to [place] on 4/12/21 for hypoxic/hypercapnic resp failure and hyperkalemia secondary to ESRD. While admitted pt was found to be confused, MRI obtained revealed large acute infarct of the left PCA. Pt was originally on Asprin which was changed to Plavix. Patient was discharged to rehab on Plavix.    On night of admission to [facility], had PEA arrest with ROSC after 30sec CPR and IVP Epi 1mg x1. Subsequently re-intubated and started on pressors for hypotension. Underwent placement of L groin Shiley on 4/29; vascular surgery suspect occlusion of left UE fistula, plan for fistulogram and intervention at some point. Patient was extubated on 5/1; repeat CT head scan have been stable, thus restarted on ASA and SQH on 5/3.     Patient was seen and examined at the bedside this afternoon; [employer] ID: [id number] provided Roberto translations. Patient thus far has been a poor historian and confused throughout admission, as per chart review. She inconsistently answered questions and did not follow commands. However, denied pain or discomfort.       Imaging showed:  < from: CT Head No Cont (04.28.21 @ 14:43) >  IMPRESSION:  Evolving left posterior cerebral artery territorial infarct with new hemorrhages predominantly in the cortical locations.  < end of copied text >    < from: CT Head No Cont (05.03.21 @ 19:04) >  IMPRESSION: Stable follow-up CT examination.  < end of copied text >    < from: TTE Echo Complete w/o Contrast w/ Doppler (04.29.21 @ 10:05) >  Summary:   1. Technically suboptimal study.   2. Mildly enlarged left atrium.   3. Left ventricular ejection fraction, by visual estimation, is 45 to 50%.   4. Elevated mean left atrial pressure.   5. Mildly enlarged right atrium.   6. The right ventricular size is normal. Mildly reduced RV systolic function.   7. No significant valvular abnormality.   8. There is no evidence of pericardial effusion.   9. No cardiac mass, vegetations, thrombus or shunts visualized. However, LIBIA is a more sensitive test to evaluate for these findings.  < end of copied text >      REVIEW OF SYSTEMS  Denied bodily pain or discomfort, but otherwise, ROS limited 2/2 patient's condition    VITALS  T(C): 36.7 (05-05-21 @ 13:25), Max: 37.2 (05-05-21 @ 05:12)  HR: 102 (05-05-21 @ 13:25) (69 - 130)  BP: 126/71 (05-05-21 @ 13:25) (95/83 - 146/87)  RR: 17 (05-05-21 @ 13:25) (17 - 19)  SpO2: 97% (05-05-21 @ 13:25) (78% - 99%)  Wt(kg): --    PAST MEDICAL & SURGICAL HISTORY  Diabetes  Hypertension  Myocardial infarction  Pneumonia  Hypertension  CRF (chronic renal failure)  Diabetes  CAD (coronary artery disease)  ESRD (end stage renal disease) on dialysis  Liver abscess  Acute urinary retention  Cholecystitis with cholangitis  Chronic pancreatitis  PAF (paroxysmal atrial fibrillation)  H/O diabetic neuropathy  H/O: hysterectomy  History of biliary stent insertion  S/P arteriovenous (AV) fistula creation  Cataract        SOCIAL HISTORY - as per documentation/history  Smoking - None  EtOH - None  Drugs - None    FUNCTIONAL HISTORY  Admitted from [facility]. Prior to initial hospitalization in April, was living private home with her spouse and family members; (+) stair lift at home)  Prior to hospitalization in April, was independent with ambulation w/RW and with most ADLs.    CURRENT FUNCTIONAL STATUS  Bed Mobility: Rolling/Turning:     · Level of [place]	moderate assist (50% patients effort)    Bed Mobility: Sit to Supine:     · Level of [place]	maximum assist (25% patients effort)  · Physical Assist/Nonphysical Assist	2 person assist    Bed Mobility: Supine to Sit:     · Level of [place]	maximum assist (25% patients effort)  · Physical Assist/Nonphysical Assist	2 person assist    Transfer: Sit to Stand:     · Level of [place]	maximum assist (25% patients effort)  · Physical Assist/Nonphysical Assist	2 person assist    Transfer: Stand to Sit:     · Level of [place]	maximum assist (25% patients effort)  · Physical Assist/Nonphysical Assist	2 person assist      FAMILY HISTORY   No pertinent family history in first degree relatives  No pertinent family history in first degree relatives      RECENT LABS/IMAGING  CBC Full  -  ( 05 May 2021 10:33 )  WBC Count : 11.42 K/uL  RBC Count : 3.19 M/uL  Hemoglobin : 9.2 g/dL  Hematocrit : 31.0 %  Platelet Count - Automated : 244 K/uL  Mean Cell Volume : 97.2 fl  Mean Cell Hemoglobin : 28.8 pg  Mean Cell Hemoglobin Concentration : 29.7 gm/dL  Auto Neutrophil # : x  Auto Lymphocyte # : x  Auto Monocyte # : x  Auto Eosinophil # : x  Auto Basophil # : x  Auto Neutrophil % : x  Auto Lymphocyte % : x  Auto Monocyte % : x  Auto Eosinophil % : x  Auto Basophil % : x    05-05    141  |  99  |  51.0<H>  ----------------------------<  132<H>  5.2   |  23.0  |  7.54<H>    Ca    8.6      05 May 2021 10:33  Phos  5.0     05-05  Mg     2.3     05-05    TPro  x   /  Alb  3.0<L>  /  TBili  x   /  DBili  x   /  AST  x   /  ALT  x   /  AlkPhos  x   05-05      ALLERGIES  ertapenem (Urticaria)  Purell (Rash)      MEDICATIONS   acetaminophen    Suspension .. 650 milliGRAM(s) Oral every 6 hours PRN  aspirin  chewable 81 milliGRAM(s) Enteral Tube daily  atorvastatin 40 milliGRAM(s) Oral at bedtime  dextrose 40% Gel 15 Gram(s) Oral once  dextrose 5%. 1000 milliLiter(s) IV Continuous <Continuous>  dextrose 5%. 1000 milliLiter(s) IV Continuous <Continuous>  dextrose 50% Injectable 25 Gram(s) IV Push once  dextrose 50% Injectable 12.5 Gram(s) IV Push once  epoetin analilia-epbx (RETACRIT) Injectable 83428 Unit(s) IV Push <User Schedule>  glucagon  Injectable 1 milliGRAM(s) IntraMuscular once  heparin   Injectable 5000 Unit(s) SubCutaneous every 8 hours  insulin lispro (ADMELOG) corrective regimen sliding scale   SubCutaneous every 6 hours  midodrine 2.5 milliGRAM(s) Oral <User Schedule>  midodrine 2.5 milliGRAM(s) Oral <User Schedule> PRN  Nephro-debbie 1 Tablet(s) Oral daily  pantoprazole   Suspension 40 milliGRAM(s) Oral daily  polyethylene glycol 3350 17 Gram(s) Oral daily  senna 2 Tablet(s) Oral at bedtime  valproate sodium IVPB 500 milliGRAM(s) IV Intermittent every 12 hours      ----------------------------------------------------------------------------------------  PHYSICAL EXAM  Constitutional - NAD, appeared restless in bed with restraints on both UEs  HEENT - NCAT, Left pupil appears more dilated than right; reactive to light b/l  Chest - Breathing comfortably on RA  Cardiovascular - warm and well perfused  Abdomen - Soft and nontender  Extremities - No pitting edema noted b/l   Neurologic Exam -                    Cognitive - Oriented to self, but not to place or time. Did not consistently answer questions appropriately.     Motor - Unable to assess, as patient was not following commands      Sensory -  Unable to assess, as patient was not following commands   ----------------------------------------------------------------------------------------    ASSESSMENT/PLAN  70 y/o female with PMHx CAD s/p stents '07, HTN, MI, PAF, liver abscess, s/p biliary stent with removal (11/2018; 7/2/19), chronic pancreatitis, DM2 on insulin, diabetic neuropathy, ESRD (5/2018) on HD (M/W/F since 5/18) presents to [facility] as a transfer from [facility] on 04/28/2021, found to have evolving CVA from prior admission (4/12) with new areas of hemorrhage.     CVA - care as per neurosurgery; CT head scans stable-->restarted on ASA and SQH  Pain - Tylenol  DVT PPX -SQH and SCDs  Rehab -    OT evaluation pending. Anticipate patient will likely require discharge to [facility]. Patient currently DOES NOT meet acute inpatient rehabilitation criteria. Rehab team will continue to follow and provide updates to primary team with recommendations. Patient is a 71y old  Female who presents with a chief complaint of CVA with hemorrhagic transformation (05 May 2021 11:50)    HPI:  72 y/o female with PMHx CAD s/p stents '07, HTN, MI, PAF, liver abscess, s/p biliary stent with removal (11/2018; 7/2/19), chronic pancreatitis, DM2 on insulin, diabetic neuropathy, ESRD (5/2018) on HD (M/W/F since 5/18) presents to [facility] as a transfer from [place] after she went to HD today with and had an episode of syncope and change in mental status. Patient had HD and 30 mins into HD patient vomited and had generalized weakness. HD was stopped, patient was awake and alert, and sepsis work up was done, WBC count 21K. Patient received 1st dose of vancomycin and RN reported she became unresponsive, L gaze preference, and was perseverating. Patient went for emergent CTH which revealed evolving L posterior cerebral artery infarct with new hemorrhage. Patient was subsequently intubated due to concern for deteriorating. Patient was then transferred to [facility].     Of note, patient was recently admitted to [place] on 4/12/21 for hypoxic/hypercapnic resp failure and hyperkalemia secondary to ESRD. While admitted pt was found to be confused, MRI obtained revealed large acute infarct of the left PCA. Pt was originally on Asprin which was changed to Plavix. Patient was discharged to rehab on Plavix.    On night of admission to [facility], had PEA arrest with ROSC after 30sec CPR and IVP Epi 1mg x1. Subsequently re-intubated and started on pressors for hypotension. Underwent placement of L groin Shiley on 4/29; vascular surgery suspect occlusion of left UE fistula, plan for fistulogram and intervention at some point. Patient was extubated on 5/1; repeat CT head scan have been stable, thus restarted on ASA and SQH on 5/3.     Patient was seen and examined at the bedside this afternoon; [employer] ID: [id number] provided [place] translations. Patient thus far has been a poor historian and confused throughout admission, as per chart review. She inconsistently answered questions and did not follow commands. However, denied pain or discomfort.       Imaging showed:  < from: CT Head No Cont (04.28.21 @ 14:43) >  IMPRESSION:  Evolving left posterior cerebral artery territorial infarct with new hemorrhages predominantly in the cortical locations.  < end of copied text >    < from: CT Head No Cont (05.03.21 @ 19:04) >  IMPRESSION: Stable follow-up CT examination.  < end of copied text >    < from: TTE Echo Complete w/o Contrast w/ Doppler (04.29.21 @ 10:05) >  Summary:   1. Technically suboptimal study.   2. Mildly enlarged left atrium.   3. Left ventricular ejection fraction, by visual estimation, is 45 to 50%.   4. Elevated mean left atrial pressure.   5. Mildly enlarged right atrium.   6. The right ventricular size is normal. Mildly reduced RV systolic function.   7. No significant valvular abnormality.   8. There is no evidence of pericardial effusion.   9. No cardiac mass, vegetations, thrombus or shunts visualized. However, LIBIA is a more sensitive test to evaluate for these findings.  < end of copied text >      REVIEW OF SYSTEMS  Denied bodily pain or discomfort, but otherwise, ROS limited 2/2 patient's condition    VITALS  T(C): 36.7 (05-05-21 @ 13:25), Max: 37.2 (05-05-21 @ 05:12)  HR: 102 (05-05-21 @ 13:25) (69 - 130)  BP: 126/71 (05-05-21 @ 13:25) (95/83 - 146/87)  RR: 17 (05-05-21 @ 13:25) (17 - 19)  SpO2: 97% (05-05-21 @ 13:25) (78% - 99%)  Wt(kg): --    PAST MEDICAL & SURGICAL HISTORY  Diabetes  Hypertension  Myocardial infarction  Pneumonia  Hypertension  CRF (chronic renal failure)  Diabetes  CAD (coronary artery disease)  ESRD (end stage renal disease) on dialysis  Liver abscess  Acute urinary retention  Cholecystitis with cholangitis  Chronic pancreatitis  PAF (paroxysmal atrial fibrillation)  H/O diabetic neuropathy  H/O: hysterectomy  History of biliary stent insertion  S/P arteriovenous (AV) fistula creation  Cataract        SOCIAL HISTORY - as per documentation/history  Smoking - None  EtOH - None  Drugs - None    FUNCTIONAL HISTORY  Admitted from [facility]. Prior to initial hospitalization in April, was living private home with her spouse and family members; (+) stair lift at home)  Prior to hospitalization in April, was independent with ambulation w/RW and with most ADLs.    CURRENT FUNCTIONAL STATUS  Bed Mobility: Rolling/Turning:     · Level of [place]	moderate assist (50% patients effort)    Bed Mobility: Sit to Supine:     · Level of [place]	maximum assist (25% patients effort)  · Physical Assist/Nonphysical Assist	2 person assist    Bed Mobility: Supine to Sit:     · Level of [place]	maximum assist (25% patients effort)  · Physical Assist/Nonphysical Assist	2 person assist    Transfer: Sit to Stand:     · Level of [place]	maximum assist (25% patients effort)  · Physical Assist/Nonphysical Assist	2 person assist    Transfer: Stand to Sit:     · Level of [place]	maximum assist (25% patients effort)  · Physical Assist/Nonphysical Assist	2 person assist      FAMILY HISTORY   No pertinent family history in first degree relatives  No pertinent family history in first degree relatives      RECENT LABS/IMAGING  CBC Full  -  ( 05 May 2021 10:33 )  WBC Count : 11.42 K/uL  RBC Count : 3.19 M/uL  Hemoglobin : 9.2 g/dL  Hematocrit : 31.0 %  Platelet Count - Automated : 244 K/uL  Mean Cell Volume : 97.2 fl  Mean Cell Hemoglobin : 28.8 pg  Mean Cell Hemoglobin Concentration : 29.7 gm/dL  Auto Neutrophil # : x  Auto Lymphocyte # : x  Auto Monocyte # : x  Auto Eosinophil # : x  Auto Basophil # : x  Auto Neutrophil % : x  Auto Lymphocyte % : x  Auto Monocyte % : x  Auto Eosinophil % : x  Auto Basophil % : x    05-05    141  |  99  |  51.0<H>  ----------------------------<  132<H>  5.2   |  23.0  |  7.54<H>    Ca    8.6      05 May 2021 10:33  Phos  5.0     05-05  Mg     2.3     05-05    TPro  x   /  Alb  3.0<L>  /  TBili  x   /  DBili  x   /  AST  x   /  ALT  x   /  AlkPhos  x   05-05      ALLERGIES  ertapenem (Urticaria)  Purell (Rash)      MEDICATIONS   acetaminophen    Suspension .. 650 milliGRAM(s) Oral every 6 hours PRN  aspirin  chewable 81 milliGRAM(s) Enteral Tube daily  atorvastatin 40 milliGRAM(s) Oral at bedtime  dextrose 40% Gel 15 Gram(s) Oral once  dextrose 5%. 1000 milliLiter(s) IV Continuous <Continuous>  dextrose 5%. 1000 milliLiter(s) IV Continuous <Continuous>  dextrose 50% Injectable 25 Gram(s) IV Push once  dextrose 50% Injectable 12.5 Gram(s) IV Push once  epoetin analilia-epbx (RETACRIT) Injectable 24572 Unit(s) IV Push <User Schedule>  glucagon  Injectable 1 milliGRAM(s) IntraMuscular once  heparin   Injectable 5000 Unit(s) SubCutaneous every 8 hours  insulin lispro (ADMELOG) corrective regimen sliding scale   SubCutaneous every 6 hours  midodrine 2.5 milliGRAM(s) Oral <User Schedule>  midodrine 2.5 milliGRAM(s) Oral <User Schedule> PRN  Nephro-debbie 1 Tablet(s) Oral daily  pantoprazole   Suspension 40 milliGRAM(s) Oral daily  polyethylene glycol 3350 17 Gram(s) Oral daily  senna 2 Tablet(s) Oral at bedtime  valproate sodium IVPB 500 milliGRAM(s) IV Intermittent every 12 hours      ----------------------------------------------------------------------------------------  PHYSICAL EXAM  Constitutional - NAD, appeared restless in bed with restraints on both UEs  HEENT - NCAT, Left pupil appears more dilated than right; reactive to light b/l  Chest - Breathing comfortably on RA  Cardiovascular - warm and well perfused  Abdomen - Soft and nontender  Extremities - No pitting edema noted b/l   Neurologic Exam -                    Cognitive - Oriented to self, but not to place or time. Did not consistently answer questions appropriately.     Motor - Unable to assess, as patient was not following commands      Sensory -  Unable to assess, as patient was not following commands   ----------------------------------------------------------------------------------------    ASSESSMENT/PLAN  72 y/o female with PMHx CAD s/p stents '07, HTN, MI, PAF, liver abscess, s/p biliary stent with removal (11/2018; 7/2/19), chronic pancreatitis, DM2 on insulin, diabetic neuropathy, ESRD (5/2018) on HD (M/W/F since 5/18) presents to [facility] as a transfer from [facility] on 04/28/2021, found to have evolving CVA from prior admission (4/12) with new areas of hemorrhage.     CVA - care as per neurosurgery; CT head scans stable-->restarted on ASA and SQH  ESRD - care as per nephrology  Pain - Tylenol  DVT PPX -SQH and SCDs  Rehab -    OT evaluation pending. Anticipate patient will likely require discharge to [facility]. Patient currently DOES NOT meet acute inpatient rehabilitation criteria. Rehab team will continue to follow and provide updates to primary team with recommendations.

## 2021-05-05 NOTE — CONSULT NOTE ADULT - ATTENDING COMMENTS
Patient evaluated at the bedside. L forearm no thrill or bruit. Will re evaluate after she recovers. Use non tunneled cath for now.
Pt seen and examined. Agree with above assessment and plan    wean sedation, eval for possible extubation  repeat CTH for stability  q1hr neurochecks
Patient seen and examined by me.  I have discussed my recommendation with the PA which are outlined above.  Patient has no complaints suggestive of angina or CHF.  Patient is followed by a Cardiologist in [place], [place]  Currently unable to resume her home medications for HF and CAD because of low SBP,  Please restart her home medications as BP permits    Patient to f/u her own cardiologist after discharge.  Regarding Afib and A/C- I spoke with her daughter regarding WATCHMAN DEVICE, will ask EP to see the patient, if patient is interested can pursue Watchman in the future    Discussed with Neurosurgery PA      Will sign off
seen and examined, agree with above  above d/w daughter.   will follow up

## 2021-05-05 NOTE — PROGRESS NOTE ADULT - ASSESSMENT
72 y/o female with PMHx CAD s/p stents '07, HTN, MI, PAF, liver abscess, s/p biliary stent with removal (11/2018; 7/2/19), chronic pancreatitis, DM2 on insulin, diabetic neuropathy, ESRD (5/2018) on HD (M/W/F since 5/18) presents to [facility] as a transfer from [place] after she went to HD today with and had  an episode of syncope and change in mental status. Patient had HD around 11am today, and 30 mins into HD patient vomited and had generalized weakness. HD was stopped, patient was awake and alert, and sepsis work up was done, WBC count 21K. Patient received 1st dose of vancomycin and RN reported she became unresponsive, L gaze preference, and was perseverating. Patient went for emergent CTH which revealed evolving L posterior cerebral artery infarct with new hemorrhage. Patient was subsequently intubated due to concern for deteriorating. Patient was then transferred to [facility].     Of note, patient was recently admitted to [place] on 4/12/21 for hypoxic/hypercapnic resp failure and hyperkalemia secondary to ESRD. While admitted pt was found to be confused, MRI obtained revealed large acute infarct of the left PCA. Pt was originally on Asprin which was changed to Plavix. Patient was discharged to rehab on Plavix.    Neurosurgery/NSICU team evaluated patient at bedside, patient unable to provide further history due to mental status/intubation.  (28 Apr 2021 17:33)    Above Appreciated  Cardiology consult requested for evaluation of HF, AC recommendations. Pt poor historian d/t confusion. Pt daughter at the bedside.     5/5: Pt denies chest pain, palpitations, SOB. Tele-SR HR @ 100-110s, pAfib HR @ 150s. EP recommends AC restart as per neuro. Rate control for now with Metoprolol 12.5mg q6 (Hold for SPB <90). Pt started on midodrine for pressure support. Start Lisinopril 2.5mg. EP recommends AC restart as per neuro. Rate control for now with Metoprolol 12.5mg q6 (Hold for SPB <90). CT Cardiac for Watchman eval        HFrEF 45-50%  -Echo-EF 45-50%, RV size is normal, mildly reduced RV sys. fx., no significant valvular abnormality, no evidence of pericardial effusion, no cardiac mass, vegetations, thrombus, or shunts visualized  -Cont. low dose BB   -Lisinopril 2.5 mg     pAfib  -Tele-NSR with RBBB with PACs HR @ 70s  -CT Head- revealed evolving L posterior cerebral artery infarct with new hemorrhage  -EP recommends AC restart as per neuro  -Rate control for now with Metoprolol 12.5mg q6 (Hold for SPB <90)  -CT Cardiac for Watchman eval    CAD- No symptoms of angina  -s/p cath 2007 stentx1  -Cont statin, ASA, and BB use

## 2021-05-05 NOTE — CONSULT NOTE ADULT - ASSESSMENT
70 y/o female with PMHx CVA, MI/CAD s/p stents '07, HTN, PAF, liver abscess, s/p biliary stent with removal (11/2018; 7/2/19), chronic pancreatitis, DM2 on insulin, diabetic neuropathy, ESRD (5/2018) on HD (M/W/F since 5/18) presents to [facility] as a transfer from [place] after she went to HD today with and had  an episode of syncope and change in mental status. Patient had HD around 11am today, and 30 mins into HD patient vomited and had generalized weakness. HD was stopped, patient was awake and alert, and sepsis work up was done, WBC count 21K. Patient received 1st dose of vancomycin and RN reported she became unresponsive, L gaze preference, and was perseverating. Patient went for emergent CTH which revealed evolving L posterior cerebral artery infarct with new hemorrhage. Patient was subsequently intubated due to concern for deteriorating. Patient was then transferred to [facility].     Of note, patient was recently admitted to [place] on 4/12/21 for hypoxic/hypercapnic resp failure and hyperkalemia secondary to fluid overload and responded to HD.  While admitted pt was found to be confused, MRI obtained revealed large acute infarct of the left PCA. Pt was originally on Asprin which was changed to Plavix. Patient was discharged to rehab on Plavix.    Above appreciated. EP called for consult to evaluate for possible LAAO with Watchman device. Pt seen and examined, daughter at beside. History obtained from daughter, patient is confused. Daughter is unaware of any h/o atrial fibrillation. Pt has never been on anticoagulation therapy, only aspirin therapy. Pt with known history of prior CVA (~30 years ago with mild residual right sided weakness) and past cardiac history of MI/CAD s/p PCI in 2007 follows with Dr Mosher in [place].  As per daughter, patient never reported any h/o CP, palpitation, dizziness, syncope or presyncope. Daughter does report h/o SOB during and after dialysis over the last 6 months.     A/P  1. Paroxysmal Atrial Fibrillation ([id number] = 6; age, sex, HTN, CVA, DM)  -Rate control for now with Metoprolol 12.5mg q6 (Hold for SPB <90)   -Patient with high [id number]. Would recommend anticoagulation therapy once cleared from Neurosurgical standpoint.   -Pt would benefit from LAAO with WATCHMAN device to avoid long term anticoagulation. Procedure discussed with daughter, who is agreeable. Will obtain cardiac CT while in-patient to evaluate DENIZ and Pt can followup with Dr Nguyen as out-pt to further discuss and plan.   -Called Patient's primary Cardiologist to obtain medical records: Dr Mosher ([facility] in [place]) [phone]; office closed, will reattempt tomorrow.     -Above d/w Dr Nguyen     72 y/o female with PMHx CVA, MI/CAD s/p stents '07, HTN, PAF, liver abscess, s/p biliary stent with removal (11/2018; 7/2/19), chronic pancreatitis, DM2 on insulin, diabetic neuropathy, ESRD (5/2018) on HD (M/W/F since 5/18) presents to [facility] as a transfer from [place] after she went to HD today with and had  an episode of syncope and change in mental status. Patient had HD around 11am today, and 30 mins into HD patient vomited and had generalized weakness. HD was stopped, patient was awake and alert, and sepsis work up was done, WBC count 21K. Patient received 1st dose of vancomycin and RN reported she became unresponsive, L gaze preference, and was perseverating. Patient went for emergent CTH which revealed evolving L posterior cerebral artery infarct with new hemorrhage. Patient was subsequently intubated due to concern for deteriorating. Patient was then transferred to [facility].     Of note, patient was recently admitted to [place] on 4/12/21 for hypoxic/hypercapnic resp failure and hyperkalemia secondary to fluid overload and responded to HD.  While admitted pt was found to be confused, MRI obtained revealed large acute infarct of the left PCA. Pt was originally on Asprin which was changed to Plavix. Patient was discharged to rehab on Plavix.    Above appreciated. EP called for consult to evaluate for possible LAAO with Watchman device. Pt seen and examined, daughter at beside. History obtained from daughter, patient is confused. Daughter is unaware of any h/o atrial fibrillation. Pt has never been on anticoagulation therapy, only aspirin therapy. Pt with known history of prior CVA (~30 years ago with mild residual right sided weakness) and past cardiac history of MI/CAD s/p PCI in 2007 follows with Dr Mosher in [place].  As per daughter, patient has not reported any recent complaints of CP, palpitation, or dizziness. No prior history of syncope or presyncope. Daughter does report h/o SOB during and after dialysis over the last 6 months.     A/P  1. Paroxysmal Atrial Fibrillation ([id number] = 6; age, sex, HTN, CVA, DM)  -Rate control for now with Metoprolol 12.5mg q6 (Hold for SPB <90)   -Patient with high [id number]. Would recommend anticoagulation therapy once cleared from Neurosurgical standpoint.   -Pt would benefit from LAAO with WATCHMAN device to avoid long term anticoagulation. Procedure discussed with daughter, who is agreeable. Will obtain cardiac CT while in-patient to evaluate DENIZ and Pt can followup with Dr Nguyen as out-pt to further discuss and plan.   -Called Patient's primary Cardiologist to obtain medical records: Dr Mosher ([facility] in [place]) [phone]; office closed, will reattempt tomorrow.     -Above d/w Dr Nguyen     70 y/o female with PMHx CVA, MI/CAD s/p stents '07, HTN, PAF, liver abscess, s/p biliary stent with removal (11/2018; 7/2/19), chronic pancreatitis, DM2 on insulin, diabetic neuropathy, ESRD (5/2018) on HD (M/W/F since 5/18) presents to [facility] as a transfer from [place] after she went to HD today with and had  an episode of syncope and change in mental status. Patient had HD around 11am today, and 30 mins into HD patient vomited and had generalized weakness. HD was stopped, patient was awake and alert, and sepsis work up was done, WBC count 21K. Patient received 1st dose of vancomycin and RN reported she became unresponsive, L gaze preference, and was perseverating. Patient went for emergent CTH which revealed evolving L posterior cerebral artery infarct with new hemorrhage. Patient was subsequently intubated due to concern for deteriorating. Patient was then transferred to [facility].     Of note, patient was recently admitted to [place] on 4/12/21 for hypoxic/hypercapnic resp failure and hyperkalemia secondary to fluid overload and responded to HD.  While admitted pt was found to be confused, MRI obtained revealed large acute infarct of the left PCA. Pt was originally on Asprin which was changed to Plavix. Patient was discharged to rehab on Plavix.    Above appreciated. EP called for consult to evaluate for possible LAAO with Watchman device. Pt seen and examined, daughter at beside. History obtained from daughter, patient is confused. Daughter is unaware of any h/o atrial fibrillation. Pt has never been on anticoagulation therapy, only aspirin therapy. Pt with known history of prior CVA (~30 years ago with mild residual right sided weakness) and past cardiac history of MI/CAD s/p PCI in 2007 follows with Dr Mosher in [place].  As per daughter, patient has not reported any recent complaints of CP, palpitation, or dizziness. No prior history of syncope or presyncope. Daughter does report h/o SOB during and after dialysis over the last 6 months.     A/P  1. Paroxysmal Atrial Fibrillation ([id number] = 6; age, sex, HTN, CVA, DM)  -Rate control for now with Metoprolol 12.5mg q6 (Hold for SPB <90)   -Patient with high [id number]. Would recommend anticoagulation therapy once cleared from Neurosurgical standpoint.   -Pt would benefit from LAAO with WATCHMAN device to avoid long term anticoagulation. Procedure discussed with daughter, who is agreeable. Will obtain cardiac CT while in-patient to evaluate DENIZ and Pt can followup with Dr Nguyen as out-pt to further discuss and plan.   -Called Patient's primary Cardiologist to obtain medical records: Dr Mosher ([facility] in [place]) [phone]; office closed, will reattempt tomorrow.     -Above d/w Dr Nguyen

## 2021-05-05 NOTE — PROGRESS NOTE ADULT - SUBJECTIVE AND OBJECTIVE BOX
FOLLOW UP PROGRESS NOTE  Patient previously seen by vascular surgery for poor functioning LUE AVF, which was shown on ultrasound evaluation with thrombosis of cephalic outflow vein. Plan was for fistulogram when patient more stable, and paitent now downgraded from NeuroICU. Has been getting HD through Left femoral shiley though reported to not be functioning and only had 1.5hrs of HD today. On discussion with nephrology, okay for removal and to give line holiday with possible replacement tomorrow. Seen at bedside, confused with wrist restraints in place. NAD, no agitation.     Shiley removed at bedside without issue, pressure held for over 10 minutes with hemostasis achieved. Also, bedside ultrasound done to evaluate internal jugular veins for next shiley placement planning. RIJ with notable thrombi. Left IOJ patent and with better dilation. Likely L IJ placement of next shiley.     MEDICATIONS  (STANDING):  aspirin  chewable 81 milliGRAM(s) Enteral Tube daily  atorvastatin 40 milliGRAM(s) Oral at bedtime  dextrose 40% Gel 15 Gram(s) Oral once  dextrose 5%. 1000 milliLiter(s) (50 mL/Hr) IV Continuous <Continuous>  dextrose 5%. 1000 milliLiter(s) (100 mL/Hr) IV Continuous <Continuous>  dextrose 50% Injectable 25 Gram(s) IV Push once  dextrose 50% Injectable 12.5 Gram(s) IV Push once  epoetin analilia-epbx (RETACRIT) Injectable 74166 Unit(s) IV Push <User Schedule>  glucagon  Injectable 1 milliGRAM(s) IntraMuscular once  heparin   Injectable 5000 Unit(s) SubCutaneous every 8 hours  insulin lispro (ADMELOG) corrective regimen sliding scale   SubCutaneous every 6 hours  midodrine 2.5 milliGRAM(s) Oral <User Schedule>  Nephro-debbie 1 Tablet(s) Oral daily  pantoprazole   Suspension 40 milliGRAM(s) Oral daily  polyethylene glycol 3350 17 Gram(s) Oral daily  senna 2 Tablet(s) Oral at bedtime  valproate sodium IVPB 500 milliGRAM(s) IV Intermittent every 12 hours    MEDICATIONS  (PRN):  acetaminophen    Suspension .. 650 milliGRAM(s) Oral every 6 hours PRN Temp greater or equal to 38C (100.4F), Mild Pain (1 - 3)  midodrine 2.5 milliGRAM(s) Oral <User Schedule> PRN 15 minutes prior to dialysis if MAP < 65      Vital Signs Last 24 Hrs  T(C): 36.7 (05 May 2021 13:25), Max: 37.2 (05 May 2021 05:12)  T(F): 98 (05 May 2021 13:25), Max: 98.9 (05 May 2021 05:12)  HR: 102 (05 May 2021 13:25) (69 - 130)  BP: 126/71 (05 May 2021 13:25) (97/83 - 146/87)  BP(mean): 103 (04 May 2021 18:00) (88 - 103)  RR: 17 (05 May 2021 13:25) (17 - 19)  SpO2: 97% (05 May 2021 13:25) (94% - 99%)    Constitutional: laying in bed, in no acute distress. Confused wrist restrains.   Neck: Full ROM without pain, supple  Respiratory: respirations are unlabored, no accessory muscle use  Cardiovascular: regular rate & rhythm  Gastrointestinal: Abdomen soft, non-tender, non-distended  Left groin shiley with dressing in place c/d/i. Removed at bedside. Hemostasis achieved, dressing placed.       I&O's Detail    04 May 2021 07:01  -  05 May 2021 07:00  --------------------------------------------------------  IN:    Glucerna 1.5: 20 mL    IV PiggyBack: 50 mL  Total IN: 70 mL    OUT:  Total OUT: 0 mL    Total NET: 70 mL      05 May 2021 07:01  -  05 May 2021 15:30  --------------------------------------------------------  IN:  Total IN: 0 mL    OUT:    Other (mL): 0 mL  Total OUT: 0 mL    Total NET: 0 mL          LABS:                        9.2    11.42 )-----------( 244      ( 05 May 2021 10:33 )             31.0     05-05    141  |  99  |  51.0<H>  ----------------------------<  132<H>  5.2   |  23.0  |  7.54<H>    Ca    8.6      05 May 2021 10:33  Phos  5.0     05-05  Mg     2.3     05-05    TPro  x   /  Alb  3.0<L>  /  TBili  x   /  DBili  x   /  AST  x   /  ALT  x   /  AlkPhos  x   05-05

## 2021-05-05 NOTE — PROGRESS NOTE ADULT - SUBJECTIVE AND OBJECTIVE BOX
[facility]                                                               Office: [address], [place] [address]                                                              Telephone: [phone]. Fax:[phone]                                                                             PROGRESS NOTE  Reason for follow up: CVA with hemorrhagic transformation  Overnight: No new events.   Update: 5/5: Pt denies chest pain, palpitations, SOB. Tele-SR HR @ 100-110s, pAfib HR @ 150s. EP recommends AC restart as per neuro. Rate control for now with Metoprolol 12.5mg q6 (Hold for SPB <90). Pt started on midodrine for pressure support. Start Lisinopril 2.5mg. EP recommends AC restart as per neuro. Rate control for now with Metoprolol 12.5mg q6 (Hold for SPB <90). CT Cardiac for Watchman eval      Subjective: No new events    	  Vitals:  T(C): 36.7 (05-05-21 @ 13:25), Max: 37.2 (05-05-21 @ 05:12)  HR: 102 (05-05-21 @ 13:25) (69 - 130)  BP: 126/71 (05-05-21 @ 13:25) (106/39 - 146/87)  RR: 17 (05-05-21 @ 13:25) (17 - 19)  SpO2: 97% (05-05-21 @ 13:25) (94% - 99%)    I&O's Summary    04 May 2021 07:01  -  05 May 2021 07:00  --------------------------------------------------------  IN: 70 mL / OUT: 0 mL / NET: 70 mL    05 May 2021 07:01  -  05 May 2021 17:45  --------------------------------------------------------  IN: 0 mL / OUT: 0 mL / NET: 0 mL            PHYSICAL EXAM:  Appearance: Comfortable. No acute distress  HEENT:  Head and neck: Atraumatic. Normocephalic.  Normal oral mucosa, PERRL, Neck is supple. No JVD, No carotid bruit.   Neurologic: A & O x 0-1, confusion, no focal deficits. EOMI.  Lymphatic: No cervical lymphadenopathy  Cardiovascular: Normal S1 S2, No murmur, rubs/gallops. No JVD, No edema  Respiratory: Lungs clear to auscultation  Gastrointestinal:  Soft, Non-tender, + BS  Lower Extremities: No edema  Psychiatry: Patient is calm. No agitation. Mood & affect appropriate  Skin: No rashes/ ecchymoses/cyanosis/ulcers visualized on the face, hands or feet.      CURRENT MEDICATIONS:  midodrine 2.5 milliGRAM(s) Oral <User Schedule>  midodrine 2.5 milliGRAM(s) Oral <User Schedule> PRN  valproate sodium IVPB  pantoprazole   Suspension  polyethylene glycol 3350  senna  atorvastatin  dextrose 40% Gel  dextrose 50% Injectable  dextrose 50% Injectable  glucagon  Injectable  insulin lispro (ADMELOG) corrective regimen sliding scale  aspirin  chewable  dextrose 5%.  dextrose 5%.  epoetin analilia-epbx (RETACRIT) Injectable  heparin   Injectable  Nephro-debbie      DIAGNOSTIC TESTING:    [ ] Echocardiogram: < from: TTE Echo Complete w/o Contrast w/ Doppler (04.29.21 @ 10:05) >  Summary:   1. Technicallysuboptimal study.   2. Mildly enlarged left atrium.   3. Left ventricular ejection fraction, by visual estimation, is 45 to 50%.   4. Elevated mean left atrial pressure.   5. Mildly enlarged right atrium.   6. The right ventricular size is normal. Mildly reduced RV systolic function.   7. No significant valvular abnormality.   8. There is no evidence of pericardial effusion.   9. No cardiac mass, vegetations, thrombus or shunts visualized. However, LIBIA is a more sensitive test to evaluate for these findings.      OTHER: 	    CT Head:< from: CT Head No Cont (05.03.21 @ 19:04) >  IMPRESSION: Stable follow-up CT examination.    LABS:	 	                            9.2    11.42 )-----------( 244      ( 05 May 2021 10:33 )             31.0     05-05    141  |  99  |  51.0<H>  ----------------------------<  132<H>  5.2   |  23.0  |  7.54<H>    Ca    8.6      05 May 2021 10:33  Phos  5.0     05-05  Mg     2.3     05-05    TPro  x   /  Alb  3.0<L>  /  TBili  x   /  DBili  x   /  AST  x   /  ALT  x   /  AlkPhos  x   05-05      Lipid Profile: Date: 04-30 @ 04:55  Total cholesterol 106; Direct LDL: --; HDL: 40; Triglycerides:133          TELEMETRY: SR HR @ 100-110s,  pAfib HR @ 150s

## 2021-05-05 NOTE — CONSULT NOTE ADULT - ASSESSMENT
71 year old female complex medical history including cardiac and renal extensive disease -also with recent admission for pneumonia with hospital course complicated with L PCA CVA- discharged on Plavix (not ACT as patient was high risk for bleed) now with hemorrhagic conversion in L PCA.

## 2021-05-05 NOTE — CONSULT NOTE ADULT - SUBJECTIVE AND OBJECTIVE BOX
Palliative Consult  HPI This is a 71 year old female PMHx CAD s/p stents, HTN, MI, PAF, liver abscess, s/p biliary stent with removal (11/2018; 7/2/19), chronic pancreatitis, DM2 on insulin, diabetic neuropathy, ESRD (5/2018) on HD (M/W/F since 5/2018) presents to [facility] as a transfer from [place] after she went to HD today with and had  an episode of syncope and change in mental status.Ct head was done which displayed evolving L posterior cerebral artery infarct with new hemorrhage. Sent to [facility] , patient was intubated. Seen by neurosurgery and neuroicu team. Admitted in NSICU, coded while there ROSC x 1 round of CPR at which time patient also self extubated and reintubated. Course complicated also with AVF not functioning and vascular consulted due to thrombosis of cephalic vein - fistulogram being discussed. Passed swallow eval on pureed diet. Remains confused. Cardiology following - EP for possible watchman procedure. Now extubated on 5/1, downgraded to medicine team 5/4. Called for palliative care consult given complex medical issues and GOC to be addressed.       <HPI:  72 y/o female with PMHx CAD s/p stents '07, HTN, MI, PAF, liver abscess, s/p biliary stent with removal (11/2018; 7/2/19), chronic pancreatitis, DM2 on insulin, diabetic neuropathy, ESRD (5/2018) on HD (M/W/F since 5/18) presents to [facility] as a transfer from [place] after she went to HD today with and had  an episode of syncope and change in mental status. Patient had HD around 11am today, and 30 mins into HD patient vomited and had generalized weakness. HD was stopped, patient was awake and alert, and sepsis work up was done, WBC count 21K. Patient received 1st dose of vancomycin and RN reported she became unresponsive, L gaze preference, and was perseverating. Patient went for emergent CTH which revealed evolving L posterior cerebral artery infarct with new hemorrhage. Patient was subsequently intubated due to concern for deteriorating. Patient was then transferred to [facility].   Of note, patient was recently admitted to [place] on 4/12/21 for hypoxic/hypercapnic resp failure and hyperkalemia secondary to ESRD. While admitted pt was found to be confused, MRI obtained revealed large acute infarct of the left PCA. Pt was originally on Asprin which was changed to Plavix. Patient was discharged to rehab on Plavix.  Neurosurgery/NSICU team evaluated patient at bedside, patient unable to provide further history due to mental status/intubation.  (28 Apr 2021 17:33)> end     PERTINENT PMH REVIEWED: Yes     PAST MEDICAL & SURGICAL HISTORY:  Hypertension    Myocardial infarction  2007    Diabetes  Type 2 DM    CAD (coronary artery disease)  s/p stent x 1 in 2007    ESRD (end stage renal disease) on dialysis  MWF since 05/2018    Liver abscess  resolved    Acute urinary retention    Cholecystitis with cholangitis    Chronic pancreatitis    PAF (paroxysmal atrial fibrillation)  1 episode in 11/2018 while hospitalized for acute cholecystitis    H/O diabetic neuropathy    H/O: hysterectomy  1990    History of biliary stent insertion  11/2018 &amp; removal 7/2/19    S/P arteriovenous (AV) fistula creation  05/17/2019    Cataract  IOL b/l        SOCIAL HISTORY:                                     Admitted from:   [facility]     Surrogate/HCP/Guardian: Phone#: Jose Antonio Marquez (son) and daughter in law Kenny Marquez     FAMILY HISTORY:  No family history related to admission diagonsis    Baseline ADLs (prior to admission):  Dependent      Allergies  ertapenem (Urticaria)  Purell (Rash)    Present Symptoms:   Dyspnea: 0   Nausea/Vomiting: Unable to obtain due to poor mentation  Anxiety:  Unable to obtain due to poor mentation  Depression: Unable to obtain due to poor mentation  Fatigue: Yes   Loss of appetite: Unable to obtain due to poor mentation    Pain: Appeared comfortable during assessment            Character-            Duration-            Effect-            Factors-            Frequency-            Location-            Severity-    Review of Systems: Reviewed  Per HPI, all other ROS negative  Unable to obtain due to poor mentation     MEDICATIONS  (STANDING):  aspirin  chewable 81 milliGRAM(s) Enteral Tube daily  atorvastatin 40 milliGRAM(s) Oral at bedtime  dextrose 40% Gel 15 Gram(s) Oral once  dextrose 5%. 1000 milliLiter(s) (50 mL/Hr) IV Continuous <Continuous>  dextrose 5%. 1000 milliLiter(s) (100 mL/Hr) IV Continuous <Continuous>  dextrose 50% Injectable 25 Gram(s) IV Push once  dextrose 50% Injectable 12.5 Gram(s) IV Push once  epoetin analilia-epbx (RETACRIT) Injectable 39868 Unit(s) IV Push <User Schedule>  glucagon  Injectable 1 milliGRAM(s) IntraMuscular once  heparin   Injectable 5000 Unit(s) SubCutaneous every 8 hours  insulin lispro (ADMELOG) corrective regimen sliding scale   SubCutaneous every 6 hours  midodrine 2.5 milliGRAM(s) Oral <User Schedule>  Nephro-debbie 1 Tablet(s) Oral daily  pantoprazole   Suspension 40 milliGRAM(s) Oral daily  polyethylene glycol 3350 17 Gram(s) Oral daily  senna 2 Tablet(s) Oral at bedtime  valproate sodium IVPB 500 milliGRAM(s) IV Intermittent every 12 hours    MEDICATIONS  (PRN):  acetaminophen    Suspension .. 650 milliGRAM(s) Oral every 6 hours PRN Temp greater or equal to 38C (100.4F), Mild Pain (1 - 3)  midodrine 2.5 milliGRAM(s) Oral <User Schedule> PRN 15 minutes prior to dialysis if MAP < 65      Vital Signs Last 24 Hrs  T(C): 36.5 (05 May 2021 09:00), Max: 37.2 (05 May 2021 05:12)  T(F): 97.7 (05 May 2021 09:00), Max: 98.9 (05 May 2021 05:12)  HR: 69 (05 May 2021 09:00) (69 - 130)  BP: 146/87 (05 May 2021 09:00) (95/83 - 146/87)  BP(mean): 103 (04 May 2021 18:00) (64 - 103)  RR: 18 (05 May 2021 09:00) (14 - 20)  SpO2: 96% (05 May 2021 09:00) (78% - 99%)    General: alert _ confused    Karnofsky:  %__30    HEENT: dry mouth    Lungs: comfortable    CV: tachycardia    GI: incontinent     : incontinent    MSK: weakness  edema generalized     Skin: thin skin, clamy , cool    LABS:                        9.2    11.42 )-----------( 244      ( 05 May 2021 10:33 )             31.0     05-05    141  |  99  |  51.0<H>  ----------------------------<  132<H>  5.2   |  23.0  |  7.54<H>    Ca    8.6      05 May 2021 10:33  Phos  5.0     05-05  Mg     2.3     05-05    TPro  x   /  Alb  3.0<L>  /  TBili  x   /  DBili  x   /  AST  x   /  ALT  x   /  AlkPhos  x   05-05    I&O's Summary    04 May 2021 07:01  -  05 May 2021 07:00  --------------------------------------------------------  IN: 70 mL / OUT: 0 mL / NET: 70 mL    RADIOLOGY & ADDITIONAL STUDIES:  CT head 05.03.21  FINDINGS: An evolving left-sided PCA distribution infarction is again seen withareas of gyral hyperattenuation. Findings may indicate superimposed petechial hemorrhagic transformation versus cortical laminar necrosis versus a combination of both.  No new areas of acute intracranial hemorrhage are seen.  Multiple small chronic infarcts are noted within the right cerebellar hemisphere. There is no hydrocephalus.  There is diffuse cerebral volume loss with prominence of the sulci, fissures, and cisternal spaces which is normal for the patient's age. There is mild periventricular white matter hypoattenuation statistically compatible with microvascular changes given calcific atherosclerotic disease of the intracranial arteries.  The paranasal sinuses and mastoid air cells are clear. The calvarium appears intact. The orbits appear unremarkable apart from cataract removal.  IMPRESSION: Stable follow-up CT examination.    ADVANCE DIRECTIVES: FULL CODE

## 2021-05-05 NOTE — CONSULT NOTE ADULT - SUBJECTIVE AND OBJECTIVE BOX
[facility]                                     Ne[facility] [place]                                 Steve Gamboa, & Farshad                                  [address]                                        [place], [place], [address]                                             [phone]    CC: stroke  HPI:  The patient is a 71y Female who presented as transfer from [facility] on 4/28/21 with syncope and AMS at dialysis. She had elevated WBC and sepsis workup was done.  She had event of unresponsiveness, left gaze preference and perseveration  Head CT showed evolving left PCA stroke with heme.  She was intubated for airway protection and transferred to [facility].  Yesterday she was downgraded from NSICU and neurology is asked ot evaluate today.    PAST MEDICAL & SURGICAL HISTORY:  Hypertension    Myocardial infarction  2007    Diabetes  Type 2 DM    CAD (coronary artery disease)  s/p stent x 1 in 2007    ESRD (end stage renal disease) on dialysis  MWF since 05/2018    Liver abscess  resolved    Acute urinary retention    Cholecystitis with cholangitis    Chronic pancreatitis    PAF (paroxysmal atrial fibrillation)  1 episode in 11/2018 while hospitalized for acute cholecystitis    H/O diabetic neuropathy    H/O: hysterectomy  1990    History of biliary stent insertion  11/2018 &amp; removal 7/2/19    S/P arteriovenous (AV) fistula creation  05/17/2019    Cataract  IOL b/l        MEDICATIONS  (STANDING):  aspirin  chewable 81 milliGRAM(s) Enteral Tube daily  atorvastatin 40 milliGRAM(s) Oral at bedtime  dextrose 40% Gel 15 Gram(s) Oral once  dextrose 5%. 1000 milliLiter(s) (50 mL/Hr) IV Continuous <Continuous>  dextrose 5%. 1000 milliLiter(s) (100 mL/Hr) IV Continuous <Continuous>  dextrose 50% Injectable 25 Gram(s) IV Push once  dextrose 50% Injectable 12.5 Gram(s) IV Push once  epoetin analilia-epbx (RETACRIT) Injectable 71339 Unit(s) IV Push <User Schedule>  glucagon  Injectable 1 milliGRAM(s) IntraMuscular once  heparin   Injectable 5000 Unit(s) SubCutaneous every 8 hours  insulin lispro (ADMELOG) corrective regimen sliding scale   SubCutaneous every 6 hours  midodrine 2.5 milliGRAM(s) Oral <User Schedule>  Nephro-debbie 1 Tablet(s) Oral daily  pantoprazole   Suspension 40 milliGRAM(s) Oral daily  polyethylene glycol 3350 17 Gram(s) Oral daily  senna 2 Tablet(s) Oral at bedtime  valproate sodium IVPB 500 milliGRAM(s) IV Intermittent every 12 hours    MEDICATIONS  (PRN):  acetaminophen    Suspension .. 650 milliGRAM(s) Oral every 6 hours PRN Temp greater or equal to 38C (100.4F), Mild Pain (1 - 3)  midodrine 2.5 milliGRAM(s) Oral <User Schedule> PRN 15 minutes prior to dialysis if MAP < 65      Allergies    ertapenem (Urticaria)  Purell (Rash)    Intolerances        SOCIAL HISTORY:  unable to obtain    FAMILY HISTORY:  unable to obtain      ROS: 14 point ROS negative other than what is present in HPI or below    Vital Signs Last 24 Hrs  T(C): 36.7 (05 May 2021 13:25), Max: 37.2 (05 May 2021 05:12)  T(F): 98 (05 May 2021 13:25), Max: 98.9 (05 May 2021 05:12)  HR: 102 (05 May 2021 13:25) (69 - 130)  BP: 126/71 (05 May 2021 13:25) (97/83 - 146/87)  BP(mean): 103 (04 May 2021 18:00) (88 - 103)  RR: 17 (05 May 2021 13:25) (17 - 19)  SpO2: 97% (05 May 2021 13:25) (94% - 99%)      General: NAD    Detailed Neurologic Exam:    Mental status: The patient is awake and alert and has normal attention span.  The patient is confused per previous note from earlier today where translators were used.    Cranial nerves: Pupils equal and react symmetrically to light. There is no blink to threat on right.  Extraocular motion is full. There is no ptosis. Facial sensation is intact. Facial musculature is symmetric.    Motor: There is normal bulk and tone.  There is no tremor.  moves right less than left to stimuli    Sensation: grimace to light pinch b/l    Reflexes: 1+ throughout and plantar responses are equivocal.    Cerebellar: unable to assess dysmetria on finger to nose testing.    Gait : deferred    LABS:                         9.2    11.42 )-----------( 244      ( 05 May 2021 10:33 )             31.0       05-05    141  |  99  |  51.0<H>  ----------------------------<  132<H>  5.2   |  23.0  |  7.54<H>    Ca    8.6      05 May 2021 10:33  Phos  5.0     05-05  Mg     2.3     05-05    TPro  x   /  Alb  3.0<L>  /  TBili  x   /  DBili  x   /  AST  x   /  ALT  x   /  AlkPhos  x   05-05    Lipid Profile in AM (04.30.21 @ 04:55)    Cholesterol, Serum: 106 mg/dL    Triglycerides, Serum: 133 mg/dL    HDL Cholesterol, Serum: 40 mg/dL    Non HDL Cholesterol: 66: Patient’s Atherosclerotic Cardiovascular Disease (ASCVD) Risk  Optimal Level (mg/dL)  LDL Cholesterol (LDL-C)  All Patients                                < 100  ASCVD at Very High Risk1    < 70  Non-HDL Cholesterol (Non-HDL-C)  All Patients                        < 130  ASCVD at Very High Risk1   < 100  Non-HDL-Cholesterol (Non-HDL-C) is also a key target for cardiovascular  risk reduction.  Consider Familial Hypercholesterolemia when: LDL-C > 190 mg/dL or  Non-HDL-C > 220 mg/dL.  LDL-C calculation using the Friedewald equation is not provided when  triglycerides > 400 mg/dL, in which case we recommend repeating the test  after fasting, if it was not done before.  When triglycerides >150 mg/dL, calculated LDL-C is provided but may still  be inaccurate (particularly when LDL-C < 70 mg/dL). It can be  recalculated off-line using other equations (e.g. Chai SS, Sage MJ,  Levi MB, et al.HUNTER 2013;310:2061 - 8).  1 Shahab Gonzalez,et al. "2019 AHA/ACC. . . guideline on the  management of blood cholesterol: a report of the American College of  Cardiology/American Heart Association Task Force on Clinical Practice  Guidelines." Circulation;139:e1082 - e1143.  These values apply only to persons 20 years and older.  Lipid Panel updated with new test, reference ranges and interpretive  comments effective 10-. mg/dL    LDL Cholesterol Calculated: 39 mg/dL      RADIOLOGY & ADDITIONAL STUDIES (independently reviewed unless otherwise noted):  Head CT 5/3 showed left PCA stroke with hemorrhagic conversion, no mass noted

## 2021-05-05 NOTE — CONSULT NOTE ADULT - SUBJECTIVE AND OBJECTIVE BOX
[place] [facility]LECTROP[facility]  [facility]   Office: [address] [address]  Telephone: [phone] Fax:[phone]      HPI:  72 y/o female with PMHx MI/CAD s/p stents '07, HTN, PAF, liver abscess, s/p biliary stent with removal (2018; 19), chronic pancreatitis, DM2 on insulin, diabetic neuropathy, ESRD (2018) on HD (M/W/F since ) presents to [facility] as a transfer from [place] after she went to HD today with and had  an episode of syncope and change in mental status. Patient had HD around 11am today, and 30 mins into HD patient vomited and had generalized weakness. HD was stopped, patient was awake and alert, and sepsis work up was done, WBC count 21K. Patient received 1st dose of vancomycin and RN reported she became unresponsive, L gaze preference, and was perseverating. Patient went for emergent CTH which revealed evolving L posterior cerebral artery infarct with new hemorrhage. Patient was subsequently intubated due to concern for deteriorating. Patient was then transferred to [facility].     Of note, patient was recently admitted to [place] on 21 for hypoxic/hypercapnic resp failure and hyperkalemia secondary to fluid overload and responded to HD.  While admitted pt was found to be confused, MRI obtained revealed large acute infarct of the left PCA. Pt was originally on Asprin which was changed to Plavix. Patient was discharged to rehab on Plavix.    Above appreciated. EP called for consult to evaluate for possible LAAO with Watchman device. Pt with known h/o pAF ([id number] = 6; age, sex, HTN, CVA, DM) and recent ischemic CVA with hemorrhagic conversion while on plavix.     Daughter: Frederick [phone]    Cardiology summary:   EK/28  22:52; SR, RBBB, LPFB  EK/28 14:49; AF @ 113bpm, RBBB, LPFB  EK/28  11:19 AF w/ RVR, RBBB, LPFB  EK/12, SR, RBBB, LPFB  EK/14; SR, RBBB, LPFB      PAST MEDICAL & SURGICAL HISTORY:  Hypertension  Myocardial infarction  2007  Diabetes  Type 2 DM  CAD (coronary artery disease)  s/p stent x 1 in   ESRD (end stage renal disease) on dialysis  MWF since 2018  Liver abscess  resolved  Acute urinary retention  Cholecystitis with cholangitis  Chronic pancreatitis  PAF (paroxysmal atrial fibrillation)  1 episode in 2018 while hospitalized for acute cholecystitis  H/O diabetic neuropathy  H/O: hysterectomy    History of biliary stent insertion  2018 &amp; removal 19  S/P arteriovenous (AV) fistula creation  2019  Cataract  IOL b/l      REVIEW OF SYSTEMS:        MEDICATIONS  (STANDING):  aspirin  chewable 81 milliGRAM(s) Enteral Tube daily  atorvastatin 40 milliGRAM(s) Oral at bedtime  dextrose 40% Gel 15 Gram(s) Oral once  dextrose 5%. 1000 milliLiter(s) (50 mL/Hr) IV Continuous <Continuous>  dextrose 5%. 1000 milliLiter(s) (100 mL/Hr) IV Continuous <Continuous>  dextrose 50% Injectable 25 Gram(s) IV Push once  dextrose 50% Injectable 12.5 Gram(s) IV Push once  epoetin analilia-epbx (RETACRIT) Injectable 14743 Unit(s) IV Push <User Schedule>  glucagon  Injectable 1 milliGRAM(s) IntraMuscular once  heparin   Injectable 5000 Unit(s) SubCutaneous every 8 hours  insulin lispro (ADMELOG) corrective regimen sliding scale   SubCutaneous every 6 hours  midodrine 2.5 milliGRAM(s) Oral <User Schedule>  Nephro-debbie 1 Tablet(s) Oral daily  pantoprazole   Suspension 40 milliGRAM(s) Oral daily  polyethylene glycol 3350 17 Gram(s) Oral daily  senna 2 Tablet(s) Oral at bedtime  valproate sodium IVPB 500 milliGRAM(s) IV Intermittent every 12 hours    MEDICATIONS  (PRN):  acetaminophen    Suspension .. 650 milliGRAM(s) Oral every 6 hours PRN Temp greater or equal to 38C (100.4F), Mild Pain (1 - 3)  midodrine 2.5 milliGRAM(s) Oral <User Schedule> PRN 15 minutes prior to dialysis if MAP < 65      Allergies  ertapenem (Urticaria)  Purell (Rash)      SOCIAL HISTORY:    FAMILY HISTORY:  No pertinent family history in first degree relatives        Vital Signs Last 24 Hrs  T(C): 36.7 (05 May 2021 13:25), Max: 37.2 (05 May 2021 05:12)  T(F): 98 (05 May 2021 13:25), Max: 98.9 (05 May 2021 05:12)  HR: 102 (05 May 2021 13:25) (69 - 130)  BP: 126/71 (05 May 2021 13:25) (97/83 - 146/87)  BP(mean): 103 (04 May 2021 18:00) (88 - 103)  RR: 17 (05 May 2021 13:25) (17 - 19)  SpO2: 97% (05 May 2021 13:25) (94% - 99%)    Physical Exam:  Constitutional: AAOx3, NAD  Neck: supple, No JVD  Cardiovascular: +S1S2 RRR, no murmurs, rubs, gallops   Pulmonary: CTA b/l, unlabored, no wheezes, rales. rhonci  Abdomen: +BS, soft NTND  Extremities: no edema b/l, +distal pulses b/l  Neuro: non focal, speech clear, ISSA x 4    LABS:                        9.2    11.42 )-----------( 244      ( 05 May 2021 10:33 )             31.0   05-05    141  |  99  |  51.0<H>  ----------------------------<  132<H>  5.2   |  23.0  |  7.54<H>    Ca    8.6      05 May 2021 10:33  Phos  5.0     05-05  Mg     2.3     05-05    TPro  x   /  Alb  3.0<L>  /  TBili  x   /  DBili  x   /  AST  x   /  ALT  x   /  AlkPhos  x   05-05  LIVER FUNCTIONS - ( 05 May 2021 10:33 )  Alb: 3.0 g/dL / Pro: x     / ALK PHOS: x     / ALT: x     / AST: x     / GGT: x                   RADIOLOGY & ADDITIONAL STUDIES:    TTE 2020  Summary:   1. Technicallysuboptimal study.   2. Mildly enlarged left atrium.   3. Left ventricular ejection fraction, by visual estimation, is 45 to 50%.   4. Elevated mean left atrial pressure.   5. Mildly enlarged right atrium.   6. The right ventricular size is normal. Mildly reduced RV systolic function.   7. No significant valvular abnormality.   8. There is no evidence of pericardial effusion.   9. No cardiac mass, vegetations, thrombus or shunts visualized. However, LIBIA is a more sensitive test to evaluate for these findings.    MD Elizabeth, RPVI Electronically signed on 2021 at 5:59:53 PM     EXAM:  XR CHEST PORTABLE IMMED 1V                          PROCEDURE DATE:  2021          INTERPRETATION:  Clinical history: 71-year-old female, NG tube.    Four expiratory/kyphotic views are compared to 2021 and demonstrate that the ETtube has been removed. NG and left IJ line in satisfactory position with no pneumothorax.    Cardiac silhouette and pulmonary vasculature are within normal limits with no gross consolidation, right effusion, pneumothorax or acute osseous finding.    Improvement in effusion/atelectasis/possible consolidation at the left base, the left hemidiaphragm is now clearly visualized.    IMPRESSION:  Lines and tubes in satisfactory position.    Improvement in effusion/atelectasis/consolidation at the left base         [place] [facility]LECTROPHY[facility]  [facility]   Office: [address] [address]  Telephone: [phone] Fax:[phone]      HPI:  70 y/o female with PMHx CVA, MI/CAD s/p stents '07, HTN, PAF, liver abscess, s/p biliary stent with removal (2018; 19), chronic pancreatitis, DM2 on insulin, diabetic neuropathy, ESRD (2018) on HD (M/W/F since ) presents to [facility] as a transfer from [place] after she went to HD today with and had  an episode of syncope and change in mental status. Patient had HD around 11am today, and 30 mins into HD patient vomited and had generalized weakness. HD was stopped, patient was awake and alert, and sepsis work up was done, WBC count 21K. Patient received 1st dose of vancomycin and RN reported she became unresponsive, L gaze preference, and was perseverating. Patient went for emergent CTH which revealed evolving L posterior cerebral artery infarct with new hemorrhage. Patient was subsequently intubated due to concern for deteriorating. Patient was then transferred to [facility].     Of note, patient was recently admitted to [place] on 21 for hypoxic/hypercapnic resp failure and hyperkalemia secondary to fluid overload and responded to HD.  While admitted pt was found to be confused, MRI obtained revealed large acute infarct of the left PCA. Pt was originally on Asprin which was changed to Plavix. Patient was discharged to rehab on Plavix.    Above appreciated. EP called for consult to evaluate for possible LAAO with Watchman device. Pt seen and examined, daughter at beside. History obtained from daughter, patient is confused. Daughter is unaware of any h/o atrial fibrillation. Pt has never been on anticoagulation therapy, only aspirin therapy. Pt with known history of prior CVA (~30 years ago with mild residual right sided weakness) and past cardiac history of MI/CAD s/p PCI in  follows with Dr Mosher in [place].  As per daughter, patient never reported any h/o CP, palpitation, dizziness, syncope or presyncope. Daughter does report h/o SOB during and after dialysis over the last 6 months.     Daughter: Frederick [phone]  Cardiologist: Dr Mosher ([facility] [place]) [phone]    Cardiology summary:   Telemetry: AF w/ controlled VR  EK/28  22:52; SR, RBBB, LPFB  EK/28 14:49; AF @ 113bpm, RBBB, LPFB  EK/28  11:19 AF w/ RVR, RBBB, LPFB  EK/12, SR, RBBB, LPFB  EK/14; SR, RBBB, LPFB      PAST MEDICAL & SURGICAL HISTORY:  Hypertension  Myocardial infarction    Diabetes  Type 2 DM  CAD (coronary artery disease)  s/p stent x 1 in   ESRD (end stage renal disease) on dialysis  MWF since 2018  Liver abscess  resolved  Acute urinary retention  Cholecystitis with cholangitis  Chronic pancreatitis  PAF (paroxysmal atrial fibrillation)  1 episode in 2018 while hospitalized for acute cholecystitis  H/O diabetic neuropathy  H/O: hysterectomy    History of biliary stent insertion  2018 &amp; removal 19  S/P arteriovenous (AV) fistula creation  2019  Cataract  IOL b/l      REVIEW OF SYSTEMS:  UNABLE TO OBTAIN       MEDICATIONS  (STANDING):  aspirin  chewable 81 milliGRAM(s) Enteral Tube daily  atorvastatin 40 milliGRAM(s) Oral at bedtime  dextrose 40% Gel 15 Gram(s) Oral once  dextrose 5%. 1000 milliLiter(s) (50 mL/Hr) IV Continuous <Continuous>  dextrose 5%. 1000 milliLiter(s) (100 mL/Hr) IV Continuous <Continuous>  dextrose 50% Injectable 25 Gram(s) IV Push once  dextrose 50% Injectable 12.5 Gram(s) IV Push once  epoetin analilia-epbx (RETACRIT) Injectable 91670 Unit(s) IV Push <User Schedule>  glucagon  Injectable 1 milliGRAM(s) IntraMuscular once  heparin   Injectable 5000 Unit(s) SubCutaneous every 8 hours  insulin lispro (ADMELOG) corrective regimen sliding scale   SubCutaneous every 6 hours  midodrine 2.5 milliGRAM(s) Oral <User Schedule>  Nephro-debbie 1 Tablet(s) Oral daily  pantoprazole   Suspension 40 milliGRAM(s) Oral daily  polyethylene glycol 3350 17 Gram(s) Oral daily  senna 2 Tablet(s) Oral at bedtime  valproate sodium IVPB 500 milliGRAM(s) IV Intermittent every 12 hours    MEDICATIONS  (PRN):  acetaminophen    Suspension .. 650 milliGRAM(s) Oral every 6 hours PRN Temp greater or equal to 38C (100.4F), Mild Pain (1 - 3)  midodrine 2.5 milliGRAM(s) Oral <User Schedule> PRN 15 minutes prior to dialysis if MAP < 65      Allergies  ertapenem (Urticaria)  Purell (Rash)      SOCIAL HISTORY:  Denies tobacco, etoh, and drug use      Vital Signs Last 24 Hrs  T(C): 36.7 (05 May 2021 13:25), Max: 37.2 (05 May 2021 05:12)  T(F): 98 (05 May 2021 13:25), Max: 98.9 (05 May 2021 05:12)  HR: 102 (05 May 2021 13:25) (69 - 130)  BP: 126/71 (05 May 2021 13:25) (97/83 - 146/87)  BP(mean): 103 (04 May 2021 18:00) (88 - 103)  RR: 17 (05 May 2021 13:25) (17 - 19)  SpO2: 97% (05 May 2021 13:25) (94% - 99%)    Physical Exam:  Constitutional: Confused AAO x 1, NAD  Neck: supple, No JVD  Cardiovascular: +S1S2 irregular, no murmurs, rubs, gallops   Pulmonary: CTA b/l, unlabored, no wheezes, rales. rhonci  Abdomen: +BS, soft NTND  Extremities: no edema b/l,       LABS:                        9.2    11.42 )-----------( 244      ( 05 May 2021 10:33 )             31.0   05-    141  |  99  |  51.0<H>  ----------------------------<  132<H>  5.2   |  23.0  |  7.54<H>    Ca    8.6      05 May 2021 10:33  Phos  5.0     05-05  Mg     2.3     05-05    TPro  x   /  Alb  3.0<L>  /  TBili  x   /  DBili  x   /  AST  x   /  ALT  x   /  AlkPhos  x   05-05  LIVER FUNCTIONS - ( 05 May 2021 10:33 )  Alb: 3.0 g/dL / Pro: x     / ALK PHOS: x     / ALT: x     / AST: x     / GGT: x                   RADIOLOGY & ADDITIONAL STUDIES:    TTE 2020  Summary:   1. Technicallysuboptimal study.   2. Mildly enlarged left atrium.   3. Left ventricular ejection fraction, by visual estimation, is 45 to 50%.   4. Elevated mean left atrial pressure.   5. Mildly enlarged right atrium.   6. The right ventricular size is normal. Mildly reduced RV systolic function.   7. No significant valvular abnormality.   8. There is no evidence of pericardial effusion.   9. No cardiac mass, vegetations, thrombus or shunts visualized. However, LIBIA is a more sensitive test to evaluate for these findings.    MD Elizabeth, RPVI Electronically signed on 2021 at 5:59:53 PM     EXAM:  XR CHEST PORTABLE IMMED 1V                          PROCEDURE DATE:  2021          INTERPRETATION:  Clinical history: 71-year-old female, NG tube.    Four expiratory/kyphotic views are compared to 2021 and demonstrate that the ETtube has been removed. NG and left IJ line in satisfactory position with no pneumothorax.    Cardiac silhouette and pulmonary vasculature are within normal limits with no gross consolidation, right effusion, pneumothorax or acute osseous finding.    Improvement in effusion/atelectasis/possible consolidation at the left base, the left hemidiaphragm is now clearly visualized.    IMPRESSION:  Lines and tubes in satisfactory position.    Improvement in effusion/atelectasis/consolidation at the left base       [place] [facility]LECTROPHYSIOLOG[facility]  [facility]   Office: [address] [address]  Telephone: [phone] Fax:[phone]      HPI:  72 y/o female with PMHx CVA, MI/CAD s/p stents '07, HTN, PAF, liver abscess, s/p biliary stent with removal (2018; 19), chronic pancreatitis, DM2 on insulin, diabetic neuropathy, ESRD (2018) on HD (M/W/F since ) presents to [facility] as a transfer from [place] after she went to HD today with and had  an episode of syncope and change in mental status. Patient had HD around 11am today, and 30 mins into HD patient vomited and had generalized weakness. HD was stopped, patient was awake and alert, and sepsis work up was done, WBC count 21K. Patient received 1st dose of vancomycin and RN reported she became unresponsive, L gaze preference, and was perseverating. Patient went for emergent CTH which revealed evolving L posterior cerebral artery infarct with new hemorrhage. Patient was subsequently intubated due to concern for deteriorating. Patient was then transferred to [facility].     Of note, patient was recently admitted to [place] on 21 for hypoxic/hypercapnic resp failure and hyperkalemia secondary to fluid overload and responded to HD.  While admitted pt was found to be confused, MRI obtained revealed large acute infarct of the left PCA. Pt was originally on Asprin which was changed to Plavix. Patient was discharged to rehab on Plavix.    Above appreciated. EP called for consult to evaluate for possible LAAO with Watchman device. Pt seen and examined, daughter at beside. History obtained from daughter, patient is confused. Daughter is unaware of any h/o atrial fibrillation. Pt has never been on anticoagulation therapy, only aspirin therapy. Pt with known history of prior CVA (~30 years ago with mild residual right sided weakness) and past cardiac history of MI/CAD s/p PCI in  follows with Dr Mosher in [place].  As per daughter, patient has not reported any recent complaints of CP, palpitation, or dizziness. No prior history of syncope or presyncope.  Daughter does report h/o SOB during and after dialysis over the last 6 months.     Daughter: Frederick [phone]  Cardiologist: Dr Mosher ([facility] [place]) [phone]    Cardiology summary:   Telemetry: AF w/ controlled VR  EK/28  22:52; SR, RBBB, LPFB  EK/28 14:49; AF @ 113bpm, RBBB, LPFB  EK/28  11:19 AF w/ RVR, RBBB, LPFB  EK/12, SR, RBBB, LPFB  EK/14; SR, RBBB, LPFB      PAST MEDICAL & SURGICAL HISTORY:  Hypertension  Myocardial infarction    Diabetes  Type 2 DM  CAD (coronary artery disease)  s/p stent x 1 in   ESRD (end stage renal disease) on dialysis  MWF since 2018  Liver abscess  resolved  Acute urinary retention  Cholecystitis with cholangitis  Chronic pancreatitis  PAF (paroxysmal atrial fibrillation)  1 episode in 2018 while hospitalized for acute cholecystitis  H/O diabetic neuropathy  H/O: hysterectomy    History of biliary stent insertion  2018 &amp; removal 19  S/P arteriovenous (AV) fistula creation  2019  Cataract  IOL b/l      REVIEW OF SYSTEMS:  UNABLE TO OBTAIN       MEDICATIONS  (STANDING):  aspirin  chewable 81 milliGRAM(s) Enteral Tube daily  atorvastatin 40 milliGRAM(s) Oral at bedtime  dextrose 40% Gel 15 Gram(s) Oral once  dextrose 5%. 1000 milliLiter(s) (50 mL/Hr) IV Continuous <Continuous>  dextrose 5%. 1000 milliLiter(s) (100 mL/Hr) IV Continuous <Continuous>  dextrose 50% Injectable 25 Gram(s) IV Push once  dextrose 50% Injectable 12.5 Gram(s) IV Push once  epoetin analilia-epbx (RETACRIT) Injectable 53447 Unit(s) IV Push <User Schedule>  glucagon  Injectable 1 milliGRAM(s) IntraMuscular once  heparin   Injectable 5000 Unit(s) SubCutaneous every 8 hours  insulin lispro (ADMELOG) corrective regimen sliding scale   SubCutaneous every 6 hours  midodrine 2.5 milliGRAM(s) Oral <User Schedule>  Nephro-debbie 1 Tablet(s) Oral daily  pantoprazole   Suspension 40 milliGRAM(s) Oral daily  polyethylene glycol 3350 17 Gram(s) Oral daily  senna 2 Tablet(s) Oral at bedtime  valproate sodium IVPB 500 milliGRAM(s) IV Intermittent every 12 hours    MEDICATIONS  (PRN):  acetaminophen    Suspension .. 650 milliGRAM(s) Oral every 6 hours PRN Temp greater or equal to 38C (100.4F), Mild Pain (1 - 3)  midodrine 2.5 milliGRAM(s) Oral <User Schedule> PRN 15 minutes prior to dialysis if MAP < 65      Allergies  ertapenem (Urticaria)  Purell (Rash)      SOCIAL HISTORY:  Denies tobacco, etoh, and drug use      Vital Signs Last 24 Hrs  T(C): 36.7 (05 May 2021 13:25), Max: 37.2 (05 May 2021 05:12)  T(F): 98 (05 May 2021 13:25), Max: 98.9 (05 May 2021 05:12)  HR: 102 (05 May 2021 13:25) (69 - 130)  BP: 126/71 (05 May 2021 13:25) (97/83 - 146/87)  BP(mean): 103 (04 May 2021 18:00) (88 - 103)  RR: 17 (05 May 2021 13:25) (17 - 19)  SpO2: 97% (05 May 2021 13:25) (94% - 99%)    Physical Exam:  Constitutional: Confused AAO x 1, NAD  Neck: supple, No JVD  Cardiovascular: +S1S2 irregular, no murmurs, rubs, gallops   Pulmonary: CTA b/l, unlabored, no wheezes, rales. rhonci  Abdomen: +BS, soft NTND  Extremities: no edema b/l,       LABS:                        9.2    11.42 )-----------( 244      ( 05 May 2021 10:33 )             31.0   05-    141  |  99  |  51.0<H>  ----------------------------<  132<H>  5.2   |  23.0  |  7.54<H>    Ca    8.6      05 May 2021 10:33  Phos  5.0     05-05  Mg     2.3     05-05    TPro  x   /  Alb  3.0<L>  /  TBili  x   /  DBili  x   /  AST  x   /  ALT  x   /  AlkPhos  x   05-05  LIVER FUNCTIONS - ( 05 May 2021 10:33 )  Alb: 3.0 g/dL / Pro: x     / ALK PHOS: x     / ALT: x     / AST: x     / GGT: x                   RADIOLOGY & ADDITIONAL STUDIES:    TTE 2020  Summary:   1. Technicallysuboptimal study.   2. Mildly enlarged left atrium.   3. Left ventricular ejection fraction, by visual estimation, is 45 to 50%.   4. Elevated mean left atrial pressure.   5. Mildly enlarged right atrium.   6. The right ventricular size is normal. Mildly reduced RV systolic function.   7. No significant valvular abnormality.   8. There is no evidence of pericardial effusion.   9. No cardiac mass, vegetations, thrombus or shunts visualized. However, LIBIA is a more sensitive test to evaluate for these findings.    MD Elizabeth, RPVI Electronically signed on 2021 at 5:59:53 PM     EXAM:  XR CHEST PORTABLE IMMED 1V                          PROCEDURE DATE:  2021          INTERPRETATION:  Clinical history: 71-year-old female, NG tube.    Four expiratory/kyphotic views are compared to 2021 and demonstrate that the ETtube has been removed. NG and left IJ line in satisfactory position with no pneumothorax.    Cardiac silhouette and pulmonary vasculature are within normal limits with no gross consolidation, right effusion, pneumothorax or acute osseous finding.    Improvement in effusion/atelectasis/possible consolidation at the left base, the left hemidiaphragm is now clearly visualized.    IMPRESSION:  Lines and tubes in satisfactory position.    Improvement in effusion/atelectasis/consolidation at the left base       [place] [facility]LECTROP[facility]  [facility]   Office: [address] [address]  Telephone: [phone] Fax:[phone]      HPI:  72 y/o female with PMHx CVA, MI/CAD s/p stents '07, HTN, PAF, liver abscess, s/p biliary stent with removal (2018; 19), chronic pancreatitis, DM2 on insulin, diabetic neuropathy, ESRD (2018) on HD (M/W/F since ) presents to [facility] as a transfer from [place] after she went to HD today with and had  an episode of syncope and change in mental status. Patient had HD around 11am today, and 30 mins into HD patient vomited and had generalized weakness. HD was stopped, patient was awake and alert, and sepsis work up was done, WBC count 21K. Patient received 1st dose of vancomycin and RN reported she became unresponsive, L gaze preference, and was perseverating. Patient went for emergent CTH which revealed evolving L posterior cerebral artery infarct with new hemorrhage. Patient was subsequently intubated due to concern for deteriorating. Patient was then transferred to [facility].     Of note, patient was recently admitted to [place] on 21 for hypoxic/hypercapnic resp failure and hyperkalemia secondary to fluid overload and responded to HD.  While admitted pt was found to be confused, MRI obtained revealed large acute infarct of the left PCA. Pt was originally on Asprin which was changed to Plavix. Patient was discharged to rehab on Plavix.    Above appreciated. EP called for consult to evaluate for possible LAAO with Watchman device. Pt seen and examined, daughter at beside. History obtained from daughter, patient is confused. Daughter is unaware of any h/o atrial fibrillation. Pt has never been on anticoagulation therapy, only aspirin therapy. Pt with known history of prior CVA (~30 years ago with mild residual right sided weakness) and past cardiac history of MI/CAD s/p PCI in  follows with Dr Mosher in [place].  As per daughter, patient has not reported any recent complaints of CP, palpitation, or dizziness. No prior history of syncope or presyncope.  Daughter does report h/o SOB during and after dialysis over the last 6 months.     Daughter: Frederick [phone]  Cardiologist: Dr Mosher ([facility] [place]) [phone]    Cardiology summary:   Telemetry: AF w/ controlled VR  EK/28  22:52; SR, RBBB, LPFB  EK/28 14:49; AF @ 113bpm, RBBB, LPFB  EK/28  11:19 AF w/ RVR, RBBB, LPFB  EK/12, SR, RBBB, LPFB  EK/14; SR, RBBB, LPFB      PAST MEDICAL & SURGICAL HISTORY:  Hypertension  Myocardial infarction    Diabetes  Type 2 DM  CAD (coronary artery disease)  s/p stent x 1 in   ESRD (end stage renal disease) on dialysis  MWF since 2018  Liver abscess  resolved  Acute urinary retention  Cholecystitis with cholangitis  Chronic pancreatitis  ? ? PAF (paroxysmal atrial fibrillation) reported 1 episode in 2018 while hospitalized for acute cholecystitis  H/O diabetic neuropathy  H/O: hysterectomy   History of biliary stent insertion 2018 &amp; removal 19  S/P arteriovenous (AV) fistula creation 2019  Cataract IOL b/l      REVIEW OF SYSTEMS:  UNABLE TO OBTAIN       MEDICATIONS  (STANDING):  aspirin  chewable 81 milliGRAM(s) Enteral Tube daily  atorvastatin 40 milliGRAM(s) Oral at bedtime  dextrose 40% Gel 15 Gram(s) Oral once  dextrose 5%. 1000 milliLiter(s) (50 mL/Hr) IV Continuous <Continuous>  dextrose 5%. 1000 milliLiter(s) (100 mL/Hr) IV Continuous <Continuous>  dextrose 50% Injectable 25 Gram(s) IV Push once  dextrose 50% Injectable 12.5 Gram(s) IV Push once  epoetin analilia-epbx (RETACRIT) Injectable 02686 Unit(s) IV Push <User Schedule>  glucagon  Injectable 1 milliGRAM(s) IntraMuscular once  heparin   Injectable 5000 Unit(s) SubCutaneous every 8 hours  insulin lispro (ADMELOG) corrective regimen sliding scale   SubCutaneous every 6 hours  midodrine 2.5 milliGRAM(s) Oral <User Schedule>  Nephro-debbie 1 Tablet(s) Oral daily  pantoprazole   Suspension 40 milliGRAM(s) Oral daily  polyethylene glycol 3350 17 Gram(s) Oral daily  senna 2 Tablet(s) Oral at bedtime  valproate sodium IVPB 500 milliGRAM(s) IV Intermittent every 12 hours    MEDICATIONS  (PRN):  acetaminophen    Suspension .. 650 milliGRAM(s) Oral every 6 hours PRN Temp greater or equal to 38C (100.4F), Mild Pain (1 - 3)  midodrine 2.5 milliGRAM(s) Oral <User Schedule> PRN 15 minutes prior to dialysis if MAP < 65      Allergies  ertapenem (Urticaria)  Purell (Rash)      SOCIAL HISTORY:  Denies tobacco, etoh, and drug use      Vital Signs Last 24 Hrs  T(C): 36.7 (05 May 2021 13:25), Max: 37.2 (05 May 2021 05:12)  T(F): 98 (05 May 2021 13:25), Max: 98.9 (05 May 2021 05:12)  HR: 102 (05 May 2021 13:25) (69 - 130)  BP: 126/71 (05 May 2021 13:25) (97/83 - 146/87)  BP(mean): 103 (04 May 2021 18:00) (88 - 103)  RR: 17 (05 May 2021 13:25) (17 - 19)  SpO2: 97% (05 May 2021 13:25) (94% - 99%)    Physical Exam:  Constitutional: Confused AAO x 1, NAD  Neck: supple, No JVD  Cardiovascular: +S1S2 irregular, no murmurs, rubs, gallops   Pulmonary: CTA b/l, unlabored, no wheezes, rales. rhonci  Abdomen: +BS, soft NTND  Extremities: no edema b/l,       LABS:                        9.2    11.42 )-----------( 244      ( 05 May 2021 10:33 )             31.0   05-05    141  |  99  |  51.0<H>  ----------------------------<  132<H>  5.2   |  23.0  |  7.54<H>    Ca    8.6      05 May 2021 10:33  Phos  5.0     05-05  Mg     2.3     05-05    TPro  x   /  Alb  3.0<L>  /  TBili  x   /  DBili  x   /  AST  x   /  ALT  x   /  AlkPhos  x   05-05  LIVER FUNCTIONS - ( 05 May 2021 10:33 )  Alb: 3.0 g/dL / Pro: x     / ALK PHOS: x     / ALT: x     / AST: x     / GGT: x                   RADIOLOGY & ADDITIONAL STUDIES:    TTE 2020  Summary:   1. Technicallysuboptimal study.   2. Mildly enlarged left atrium.   3. Left ventricular ejection fraction, by visual estimation, is 45 to 50%.   4. Elevated mean left atrial pressure.   5. Mildly enlarged right atrium.   6. The right ventricular size is normal. Mildly reduced RV systolic function.   7. No significant valvular abnormality.   8. There is no evidence of pericardial effusion.   9. No cardiac mass, vegetations, thrombus or shunts visualized. However, LIBIA is a more sensitive test to evaluate for these findings.    MD Elizabeth, RPVI Electronically signed on 2021 at 5:59:53 PM     EXAM:  XR CHEST PORTABLE IMMED 1V                          PROCEDURE DATE:  2021          INTERPRETATION:  Clinical history: 71-year-old female, NG tube.    Four expiratory/kyphotic views are compared to 2021 and demonstrate that the ETtube has been removed. NG and left IJ line in satisfactory position with no pneumothorax.    Cardiac silhouette and pulmonary vasculature are within normal limits with no gross consolidation, right effusion, pneumothorax or acute osseous finding.    Improvement in effusion/atelectasis/possible consolidation at the left base, the left hemidiaphragm is now clearly visualized.    IMPRESSION:  Lines and tubes in satisfactory position.    Improvement in effusion/atelectasis/consolidation at the left base

## 2021-05-05 NOTE — CHART NOTE - NSCHARTNOTEFT_GEN_A_CORE
Source: Patient [ ]  Family [ ]   other [x] EMR    Current Diet:   Diet, Dysphagia 1 Pureed-No Liquids (05-04-21 @ 15:10)    Patient reports [ ] nausea  [ ] vomiting [ ] diarrhea [ ] constipation  [ ]chewing problems [ ] swallowing issues  [ ] other:     PO intake:  < 50% [ ]   50-75%  [ ]   %  [ ]  other :    Source for PO intake [ ] Patient [ ] family [x] chart [x] staff [ ] other    Current Weight:   (5/5)    190.2 lbs  (5/3)    363.3 lbs  (5/1)    365.3 lbs  (4/30)   364.8 lbs  (4/29)  196.6 lbs    % Weight Change Question accuracy of weights, weights 363-365lbs appear inaccurate    Pertinent Medications: MEDICATIONS  (STANDING):  aspirin  chewable 81 milliGRAM(s) Enteral Tube daily  atorvastatin 40 milliGRAM(s) Oral at bedtime  dextrose 40% Gel 15 Gram(s) Oral once  dextrose 5%. 1000 milliLiter(s) (50 mL/Hr) IV Continuous <Continuous>  dextrose 5%. 1000 milliLiter(s) (100 mL/Hr) IV Continuous <Continuous>  dextrose 50% Injectable 25 Gram(s) IV Push once  dextrose 50% Injectable 12.5 Gram(s) IV Push once  epoetin analilia-epbx (RETACRIT) Injectable 06067 Unit(s) IV Push <User Schedule>  glucagon  Injectable 1 milliGRAM(s) IntraMuscular once  heparin   Injectable 5000 Unit(s) SubCutaneous every 8 hours  insulin lispro (ADMELOG) corrective regimen sliding scale   SubCutaneous every 6 hours  midodrine 2.5 milliGRAM(s) Oral <User Schedule>  Nephro-debbie 1 Tablet(s) Oral daily  pantoprazole   Suspension 40 milliGRAM(s) Oral daily  polyethylene glycol 3350 17 Gram(s) Oral daily  senna 2 Tablet(s) Oral at bedtime  valproate sodium IVPB 500 milliGRAM(s) IV Intermittent every 12 hours    MEDICATIONS  (PRN):  acetaminophen    Suspension .. 650 milliGRAM(s) Oral every 6 hours PRN Temp greater or equal to 38C (100.4F), Mild Pain (1 - 3)  midodrine 2.5 milliGRAM(s) Oral <User Schedule> PRN 15 minutes prior to dialysis if MAP < 65    Pertinent Labs: CBC Full  -  ( 05 May 2021 10:33 )  WBC Count : 11.42 K/uL  RBC Count : 3.19 M/uL  Hemoglobin : 9.2 g/dL  Hematocrit : 31.0 %  Platelet Count - Automated : 244 K/uL  Mean Cell Volume : 97.2 fl  Mean Cell Hemoglobin : 28.8 pg  Mean Cell Hemoglobin Concentration : 29.7 gm/dL  05-05 Na141 mmol/L Glu 130 mg/dL<H> K+ 5.1 mmol/L Cr  7.51 mg/dL<H> BUN 51.0 mg/dL<H> Phos 5.2 mg/dL<H> Alb 3.0 g/dL<L> PAB n/a       Skin: 1+ dependent edema    Nutrition focused physical exam conducted - found signs of malnutrition [x]absent [ ]present    Subcutaneous fat loss: [ ] Orbital fat pads region, [ ]Buccal fat region, [ ]Triceps region,  [ ]Ribs region    Muscle wasting: [ ]Temples region, [ ]Clavicle region, [ ]Shoulder region, [ ]Scapula region, [ ]Interosseous region,  [ ]thigh region, [ ]Calf region    Estimated Needs:   [x] no change since previous assessment  [ ] recalculated:     Current Nutrition Diagnosis: Pt remains at nutrition risk secondary to increased nutrient needs related to increased physiological demand for nutrient as evidenced by ESRD on HD, now with left posterior cerebral artery infarct w/ new hemorrhage. Pt extubated 5/1, with NGT placed (5/2) though Pt passed SLP eval yesterday with diet advancement to puree/no liquids.     Recommendations:   1) Add Ensure Pudding TID   2) Continue Nephro-debbie daily  3) Consider Phos binder  4) Monitor weights daily for trend/accuracy     Monitoring and Evaluation:   [x] PO intake [x] Tolerance to diet prescription [X] Weights  [X] Follow up per protocol [X] Labs:

## 2021-05-06 LAB
ACANTHOCYTES BLD QL SMEAR: SLIGHT — SIGNIFICANT CHANGE UP
ALBUMIN SERPL ELPH-MCNC: 2.9 G/DL — LOW (ref 3.3–5.2)
ALBUMIN SERPL ELPH-MCNC: 2.9 G/DL — LOW (ref 3.3–5.2)
ALP SERPL-CCNC: 63 U/L — SIGNIFICANT CHANGE UP (ref 40–120)
ALP SERPL-CCNC: 65 U/L — SIGNIFICANT CHANGE UP (ref 40–120)
ALT FLD-CCNC: 8 U/L — SIGNIFICANT CHANGE UP
ALT FLD-CCNC: 8 U/L — SIGNIFICANT CHANGE UP
ANION GAP SERPL CALC-SCNC: 16 MMOL/L — SIGNIFICANT CHANGE UP (ref 5–17)
ANION GAP SERPL CALC-SCNC: 20 MMOL/L — HIGH (ref 5–17)
ANISOCYTOSIS BLD QL: SLIGHT — SIGNIFICANT CHANGE UP
AST SERPL-CCNC: 21 U/L — SIGNIFICANT CHANGE UP
AST SERPL-CCNC: 24 U/L — SIGNIFICANT CHANGE UP
BASOPHILS # BLD AUTO: 0.11 K/UL — SIGNIFICANT CHANGE UP (ref 0–0.2)
BASOPHILS # BLD AUTO: 0.14 K/UL — SIGNIFICANT CHANGE UP (ref 0–0.2)
BASOPHILS NFR BLD AUTO: 0.9 % — SIGNIFICANT CHANGE UP (ref 0–2)
BASOPHILS NFR BLD AUTO: 1.1 % — SIGNIFICANT CHANGE UP (ref 0–2)
BILIRUB SERPL-MCNC: 0.3 MG/DL — LOW (ref 0.4–2)
BILIRUB SERPL-MCNC: 0.3 MG/DL — LOW (ref 0.4–2)
BUN SERPL-MCNC: 45 MG/DL — HIGH (ref 8–20)
BUN SERPL-MCNC: 45 MG/DL — HIGH (ref 8–20)
BURR CELLS BLD QL SMEAR: PRESENT — SIGNIFICANT CHANGE UP
CALCIUM SERPL-MCNC: 8.5 MG/DL — LOW (ref 8.6–10.2)
CALCIUM SERPL-MCNC: 8.6 MG/DL — SIGNIFICANT CHANGE UP (ref 8.6–10.2)
CHLORIDE SERPL-SCNC: 101 MMOL/L — SIGNIFICANT CHANGE UP (ref 98–107)
CHLORIDE SERPL-SCNC: 106 MMOL/L — SIGNIFICANT CHANGE UP (ref 98–107)
CO2 SERPL-SCNC: 20 MMOL/L — LOW (ref 22–29)
CO2 SERPL-SCNC: 22 MMOL/L — SIGNIFICANT CHANGE UP (ref 22–29)
CREAT SERPL-MCNC: 7.41 MG/DL — HIGH (ref 0.5–1.3)
CREAT SERPL-MCNC: 7.81 MG/DL — HIGH (ref 0.5–1.3)
EOSINOPHIL # BLD AUTO: 0.19 K/UL — SIGNIFICANT CHANGE UP (ref 0–0.5)
EOSINOPHIL # BLD AUTO: 0.21 K/UL — SIGNIFICANT CHANGE UP (ref 0–0.5)
EOSINOPHIL NFR BLD AUTO: 1.5 % — SIGNIFICANT CHANGE UP (ref 0–6)
EOSINOPHIL NFR BLD AUTO: 1.8 % — SIGNIFICANT CHANGE UP (ref 0–6)
GIANT PLATELETS BLD QL SMEAR: PRESENT — SIGNIFICANT CHANGE UP
GLUCOSE BLDC GLUCOMTR-MCNC: 129 MG/DL — HIGH (ref 70–99)
GLUCOSE BLDC GLUCOMTR-MCNC: 151 MG/DL — HIGH (ref 70–99)
GLUCOSE BLDC GLUCOMTR-MCNC: 153 MG/DL — HIGH (ref 70–99)
GLUCOSE BLDC GLUCOMTR-MCNC: 166 MG/DL — HIGH (ref 70–99)
GLUCOSE BLDC GLUCOMTR-MCNC: 168 MG/DL — HIGH (ref 70–99)
GLUCOSE BLDC GLUCOMTR-MCNC: 187 MG/DL — HIGH (ref 70–99)
GLUCOSE SERPL-MCNC: 135 MG/DL — HIGH (ref 70–99)
GLUCOSE SERPL-MCNC: 173 MG/DL — HIGH (ref 70–99)
HCT VFR BLD CALC: 29.9 % — LOW (ref 34.5–45)
HCT VFR BLD CALC: 31 % — LOW (ref 34.5–45)
HGB BLD-MCNC: 8.9 G/DL — LOW (ref 11.5–15.5)
HGB BLD-MCNC: 8.9 G/DL — LOW (ref 11.5–15.5)
IMM GRANULOCYTES NFR BLD AUTO: 6.8 % — HIGH (ref 0–1.5)
LYMPHOCYTES # BLD AUTO: 19.4 % — SIGNIFICANT CHANGE UP (ref 13–44)
LYMPHOCYTES # BLD AUTO: 2.31 K/UL — SIGNIFICANT CHANGE UP (ref 1–3.3)
LYMPHOCYTES # BLD AUTO: 2.78 K/UL — SIGNIFICANT CHANGE UP (ref 1–3.3)
LYMPHOCYTES # BLD AUTO: 22.5 % — SIGNIFICANT CHANGE UP (ref 13–44)
MACROCYTES BLD QL: SLIGHT — SIGNIFICANT CHANGE UP
MAGNESIUM SERPL-MCNC: 2.3 MG/DL — SIGNIFICANT CHANGE UP (ref 1.6–2.6)
MAGNESIUM SERPL-MCNC: 2.3 MG/DL — SIGNIFICANT CHANGE UP (ref 1.6–2.6)
MANUAL SMEAR VERIFICATION: SIGNIFICANT CHANGE UP
MCHC RBC-ENTMCNC: 28.6 PG — SIGNIFICANT CHANGE UP (ref 27–34)
MCHC RBC-ENTMCNC: 28.7 GM/DL — LOW (ref 32–36)
MCHC RBC-ENTMCNC: 29.4 PG — SIGNIFICANT CHANGE UP (ref 27–34)
MCHC RBC-ENTMCNC: 29.8 GM/DL — LOW (ref 32–36)
MCV RBC AUTO: 98.7 FL — SIGNIFICANT CHANGE UP (ref 80–100)
MCV RBC AUTO: 99.7 FL — SIGNIFICANT CHANGE UP (ref 80–100)
METAMYELOCYTES # FLD: 1.8 % — HIGH (ref 0–0)
MONOCYTES # BLD AUTO: 0.85 K/UL — SIGNIFICANT CHANGE UP (ref 0–0.9)
MONOCYTES # BLD AUTO: 1.03 K/UL — HIGH (ref 0–0.9)
MONOCYTES NFR BLD AUTO: 7.1 % — SIGNIFICANT CHANGE UP (ref 2–14)
MONOCYTES NFR BLD AUTO: 8.3 % — SIGNIFICANT CHANGE UP (ref 2–14)
MYELOCYTES NFR BLD: 3.5 % — HIGH (ref 0–0)
NEUTROPHILS # BLD AUTO: 7.39 K/UL — SIGNIFICANT CHANGE UP (ref 1.8–7.4)
NEUTROPHILS # BLD AUTO: 7.7 K/UL — HIGH (ref 1.8–7.4)
NEUTROPHILS NFR BLD AUTO: 59.8 % — SIGNIFICANT CHANGE UP (ref 43–77)
NEUTROPHILS NFR BLD AUTO: 64.6 % — SIGNIFICANT CHANGE UP (ref 43–77)
NRBC # BLD: 1 /100 — HIGH (ref 0–0)
PLAT MORPH BLD: NORMAL — SIGNIFICANT CHANGE UP
PLATELET # BLD AUTO: 247 K/UL — SIGNIFICANT CHANGE UP (ref 150–400)
PLATELET # BLD AUTO: 255 K/UL — SIGNIFICANT CHANGE UP (ref 150–400)
POIKILOCYTOSIS BLD QL AUTO: SLIGHT — SIGNIFICANT CHANGE UP
POLYCHROMASIA BLD QL SMEAR: SIGNIFICANT CHANGE UP
POTASSIUM SERPL-MCNC: 5.2 MMOL/L — SIGNIFICANT CHANGE UP (ref 3.5–5.3)
POTASSIUM SERPL-MCNC: 5.5 MMOL/L — HIGH (ref 3.5–5.3)
POTASSIUM SERPL-SCNC: 5.2 MMOL/L — SIGNIFICANT CHANGE UP (ref 3.5–5.3)
POTASSIUM SERPL-SCNC: 5.5 MMOL/L — HIGH (ref 3.5–5.3)
PROCALCITONIN SERPL-MCNC: 1.06 NG/ML — HIGH (ref 0.02–0.1)
PROT SERPL-MCNC: 6.4 G/DL — LOW (ref 6.6–8.7)
PROT SERPL-MCNC: 6.7 G/DL — SIGNIFICANT CHANGE UP (ref 6.6–8.7)
RBC # BLD: 3.03 M/UL — LOW (ref 3.8–5.2)
RBC # BLD: 3.11 M/UL — LOW (ref 3.8–5.2)
RBC # FLD: 15 % — HIGH (ref 10.3–14.5)
RBC # FLD: 15.2 % — HIGH (ref 10.3–14.5)
RBC BLD AUTO: ABNORMAL
SODIUM SERPL-SCNC: 141 MMOL/L — SIGNIFICANT CHANGE UP (ref 135–145)
SODIUM SERPL-SCNC: 144 MMOL/L — SIGNIFICANT CHANGE UP (ref 135–145)
VARIANT LYMPHS # BLD: 0.9 % — SIGNIFICANT CHANGE UP (ref 0–6)
WBC # BLD: 11.92 K/UL — HIGH (ref 3.8–10.5)
WBC # BLD: 12.37 K/UL — HIGH (ref 3.8–10.5)
WBC # FLD AUTO: 11.92 K/UL — HIGH (ref 3.8–10.5)
WBC # FLD AUTO: 12.37 K/UL — HIGH (ref 3.8–10.5)

## 2021-05-06 PROCEDURE — 99233 SBSQ HOSP IP/OBS HIGH 50: CPT

## 2021-05-06 PROCEDURE — 71045 X-RAY EXAM CHEST 1 VIEW: CPT | Mod: 26

## 2021-05-06 PROCEDURE — 99232 SBSQ HOSP IP/OBS MODERATE 35: CPT

## 2021-05-06 PROCEDURE — 99231 SBSQ HOSP IP/OBS SF/LOW 25: CPT | Mod: GC

## 2021-05-06 PROCEDURE — 36556 INSERT NON-TUNNEL CV CATH: CPT

## 2021-05-06 PROCEDURE — 99233 SBSQ HOSP IP/OBS HIGH 50: CPT | Mod: 25

## 2021-05-06 PROCEDURE — 71045 X-RAY EXAM CHEST 1 VIEW: CPT | Mod: 26,77

## 2021-05-06 PROCEDURE — 76937 US GUIDE VASCULAR ACCESS: CPT | Mod: 26

## 2021-05-06 RX ORDER — SODIUM CHLORIDE 9 MG/ML
250 INJECTION INTRAMUSCULAR; INTRAVENOUS; SUBCUTANEOUS ONCE
Refills: 0 | Status: COMPLETED | OUTPATIENT
Start: 2021-05-06 | End: 2021-05-06

## 2021-05-06 RX ORDER — METOPROLOL TARTRATE 50 MG
12.5 TABLET ORAL
Refills: 0 | Status: DISCONTINUED | OUTPATIENT
Start: 2021-05-06 | End: 2021-05-06

## 2021-05-06 RX ORDER — METOPROLOL TARTRATE 50 MG
12.5 TABLET ORAL EVERY 6 HOURS
Refills: 0 | Status: DISCONTINUED | OUTPATIENT
Start: 2021-05-06 | End: 2021-05-08

## 2021-05-06 RX ORDER — MIDODRINE HYDROCHLORIDE 2.5 MG/1
10 TABLET ORAL ONCE
Refills: 0 | Status: COMPLETED | OUTPATIENT
Start: 2021-05-06 | End: 2021-05-06

## 2021-05-06 RX ORDER — SODIUM CHLORIDE 9 MG/ML
500 INJECTION INTRAMUSCULAR; INTRAVENOUS; SUBCUTANEOUS ONCE
Refills: 0 | Status: COMPLETED | OUTPATIENT
Start: 2021-05-06 | End: 2021-05-06

## 2021-05-06 RX ORDER — SODIUM CHLORIDE 9 MG/ML
10 INJECTION INTRAMUSCULAR; INTRAVENOUS; SUBCUTANEOUS
Refills: 0 | Status: DISCONTINUED | OUTPATIENT
Start: 2021-05-06 | End: 2021-05-23

## 2021-05-06 RX ORDER — CHLORHEXIDINE GLUCONATE 213 G/1000ML
1 SOLUTION TOPICAL
Refills: 0 | Status: DISCONTINUED | OUTPATIENT
Start: 2021-05-06 | End: 2021-05-23

## 2021-05-06 RX ORDER — MIDODRINE HYDROCHLORIDE 2.5 MG/1
10 TABLET ORAL THREE TIMES A DAY
Refills: 0 | Status: DISCONTINUED | OUTPATIENT
Start: 2021-05-06 | End: 2021-05-07

## 2021-05-06 RX ADMIN — HEPARIN SODIUM 5000 UNIT(S): 5000 INJECTION INTRAVENOUS; SUBCUTANEOUS at 12:53

## 2021-05-06 RX ADMIN — Medication 2: at 18:21

## 2021-05-06 RX ADMIN — MIDODRINE HYDROCHLORIDE 10 MILLIGRAM(S): 2.5 TABLET ORAL at 12:49

## 2021-05-06 RX ADMIN — Medication 2: at 05:42

## 2021-05-06 RX ADMIN — Medication 2: at 00:14

## 2021-05-06 RX ADMIN — ATORVASTATIN CALCIUM 40 MILLIGRAM(S): 80 TABLET, FILM COATED ORAL at 23:00

## 2021-05-06 RX ADMIN — HEPARIN SODIUM 5000 UNIT(S): 5000 INJECTION INTRAVENOUS; SUBCUTANEOUS at 23:01

## 2021-05-06 RX ADMIN — HEPARIN SODIUM 5000 UNIT(S): 5000 INJECTION INTRAVENOUS; SUBCUTANEOUS at 05:44

## 2021-05-06 RX ADMIN — SODIUM CHLORIDE 250 MILLILITER(S): 9 INJECTION INTRAMUSCULAR; INTRAVENOUS; SUBCUTANEOUS at 02:01

## 2021-05-06 RX ADMIN — SENNA PLUS 2 TABLET(S): 8.6 TABLET ORAL at 23:00

## 2021-05-06 RX ADMIN — SODIUM CHLORIDE 500 MILLILITER(S): 9 INJECTION INTRAMUSCULAR; INTRAVENOUS; SUBCUTANEOUS at 04:20

## 2021-05-06 RX ADMIN — Medication 2: at 23:17

## 2021-05-06 RX ADMIN — Medication 27.5 MILLIGRAM(S): at 05:42

## 2021-05-06 RX ADMIN — Medication 27.5 MILLIGRAM(S): at 18:36

## 2021-05-06 RX ADMIN — Medication 12.5 MILLIGRAM(S): at 23:00

## 2021-05-06 RX ADMIN — MIDODRINE HYDROCHLORIDE 2.5 MILLIGRAM(S): 2.5 TABLET ORAL at 02:01

## 2021-05-06 RX ADMIN — MIDODRINE HYDROCHLORIDE 10 MILLIGRAM(S): 2.5 TABLET ORAL at 04:13

## 2021-05-06 RX ADMIN — Medication 1 TABLET(S): at 12:49

## 2021-05-06 RX ADMIN — MIDODRINE HYDROCHLORIDE 10 MILLIGRAM(S): 2.5 TABLET ORAL at 23:00

## 2021-05-06 RX ADMIN — Medication 81 MILLIGRAM(S): at 12:49

## 2021-05-06 NOTE — CONSULT NOTE ADULT - SUBJECTIVE AND OBJECTIVE BOX
Patient is a 71y old  Female who presents with a chief complaint of CVA with hemorrhagic transformation (05 May 2021 17:45)      BRIEF HOSPITAL COURSE: 72 y/o female with PMHx CAD s/p stents '07, HTN, MI, PAF, liver abscess, s/p biliary stent with removal (11/2018; 7/2/19), chronic pancreatitis, DM2 on insulin, diabetic neuropathy, ESRD (5/2018) on HD (M/W/F since 5/18) who presented as transfer from [facility] on 4/28/21 with syncope and AMS at dialysis. She had elevated WBC and sepsis workup was done.  She had event of unresponsiveness, left gaze preference and perseveration  Head CT showed evolving left PCA stroke with heme.  She was intubated for airway protection and transferred to [facility]. Transferred from neuro critical care service on 5/4.    Events last 24 hours: Called by ismael Choi as patient developed hypotension and A-fib with RVR. She got 750ml of IVF and 2.5mg od midodrine without improvement. Patient seen and examined at the bedside. She does not appear in distress, resting comfortably in bed, does not speak english. BP at the bedside taken multiple times 106/66, -120s A-fib. She has not been taking much PO given her mental status and stroke. Dialysis patient that does make some urine.     PAST MEDICAL & SURGICAL HISTORY:  Hypertension    Myocardial infarction  2007    Diabetes  Type 2 DM    CAD (coronary artery disease)  s/p stent x 1 in 2007    ESRD (end stage renal disease) on dialysis  MWF since 05/2018    Liver abscess  resolved    Acute urinary retention    Cholecystitis with cholangitis    Chronic pancreatitis    PAF (paroxysmal atrial fibrillation)  1 episode in 11/2018 while hospitalized for acute cholecystitis    H/O diabetic neuropathy    H/O: hysterectomy  1990    History of biliary stent insertion  11/2018 &amp; removal 7/2/19    S/P arteriovenous (AV) fistula creation  05/17/2019    Cataract  IOL b/l        Review of Systems:  unable to obtain       Medications:    lisinopril 2.5 milliGRAM(s) Oral daily  midodrine 2.5 milliGRAM(s) Oral <User Schedule>  midodrine 2.5 milliGRAM(s) Oral <User Schedule> PRN      acetaminophen    Suspension .. 650 milliGRAM(s) Oral every 6 hours PRN  valproate sodium IVPB 500 milliGRAM(s) IV Intermittent every 12 hours      aspirin  chewable 81 milliGRAM(s) Enteral Tube daily  heparin   Injectable 5000 Unit(s) SubCutaneous every 8 hours    pantoprazole   Suspension 40 milliGRAM(s) Oral daily  polyethylene glycol 3350 17 Gram(s) Oral daily  senna 2 Tablet(s) Oral at bedtime      atorvastatin 40 milliGRAM(s) Oral at bedtime  dextrose 40% Gel 15 Gram(s) Oral once  dextrose 50% Injectable 25 Gram(s) IV Push once  dextrose 50% Injectable 12.5 Gram(s) IV Push once  glucagon  Injectable 1 milliGRAM(s) IntraMuscular once  insulin lispro (ADMELOG) corrective regimen sliding scale   SubCutaneous every 6 hours    dextrose 5%. 1000 milliLiter(s) IV Continuous <Continuous>  dextrose 5%. 1000 milliLiter(s) IV Continuous <Continuous>  Nephro-debbie 1 Tablet(s) Oral daily  sodium chloride 0.9% Bolus 500 milliLiter(s) IV Bolus once    epoetin analilia-epbx (RETACRIT) Injectable 58619 Unit(s) IV Push <User Schedule>              ICU Vital Signs Last 24 Hrs  T(C): 37.3 (06 May 2021 03:30), Max: 37.3 (06 May 2021 03:30)  T(F): 99.2 (06 May 2021 03:30), Max: 99.2 (06 May 2021 03:30)  HR: 130 (06 May 2021 02:57) (69 - 130)  BP: 80/54 (06 May 2021 02:57) (80/54 - 146/87)  BP(mean): --  ABP: --  ABP(mean): --  RR: 18 (05 May 2021 23:15) (17 - 19)  SpO2: 98% (05 May 2021 23:15) (94% - 98%)          I&O's Detail    04 May 2021 07:01  -  05 May 2021 07:00  --------------------------------------------------------  IN:    Glucerna 1.5: 20 mL    IV PiggyBack: 50 mL  Total IN: 70 mL    OUT:  Total OUT: 0 mL    Total NET: 70 mL      05 May 2021 07:01  -  06 May 2021 04:16  --------------------------------------------------------  IN:  Total IN: 0 mL    OUT:    Other (mL): 0 mL  Total OUT: 0 mL    Total NET: 0 mL            LABS:                        9.2    11.42 )-----------( 244      ( 05 May 2021 10:33 )             31.0     05-05    141  |  99  |  51.0<H>  ----------------------------<  132<H>  5.2   |  23.0  |  7.54<H>    Ca    8.6      05 May 2021 10:33  Phos  5.0     05-05  Mg     2.3     05-05    TPro  x   /  Alb  3.0<L>  /  TBili  x   /  DBili  x   /  AST  x   /  ALT  x   /  AlkPhos  x   05-05          CAPILLARY BLOOD GLUCOSE      POCT Blood Glucose.: 151 mg/dL (06 May 2021 00:07)        CULTURES:  Culture Results:   No growth at 5 days. (04-30-21 @ 08:05)  Culture Results:   No growth at 5 days. (04-29-21 @ 17:56)      Physical Examination:    General: No acute distress.  Alert, interactive    HEENT: right pupil 3mm, left pupil 4mm, reactive to light    PULM: Clear to auscultation bilaterally, no significant sputum production    CVS: Tachycardic with irregular rhythm     ABD: Soft, nondistended, nontender, normoactive bowel sounds,obese    EXT: No edema, nontender    SKIN: Warm and well perfused, poor skin turgor     NEURO: Alert, moves all four extremities    POCUS: A line predominant lung pattern, moderately reduced LV systolic function trace pericardial effusion, no RV dilation, IVC 1.5cm       RADIOLOGY: no recent radiology     TIME SPENT: 45 min   Evaluating/treating patient, reviewing data/labs/imaging, discussing case with multidisciplinary team, discussing plan/goals of care with patient/family. Non-inclusive of procedure time.

## 2021-05-06 NOTE — PROGRESS NOTE ADULT - ASSESSMENT
70 y/o female with PMHx CVA, MI/CAD s/p stents '07, HTN, PAF, liver abscess, s/p biliary stent with removal (11/2018; 7/2/19), chronic pancreatitis, DM2 on insulin, diabetic neuropathy, ESRD (5/2018) on HD (M/W/F since 5/18) presents to [facility] as a transfer from [place] after she went to HD today with and had  an episode of syncope and change in mental status. Patient had HD around 11am today, and 30 mins into HD patient vomited and had generalized weakness. HD was stopped, patient was awake and alert, and sepsis work up was done, WBC count 21K. Patient received 1st dose of vancomycin and RN reported she became unresponsive, L gaze preference, and was perseverating. Patient went for emergent CTH which revealed evolving L posterior cerebral artery infarct with new hemorrhage. Patient was subsequently intubated due to concern for deteriorating. Patient was then transferred to [facility].     Of note, patient was recently admitted to [place] on 4/12/21 for hypoxic/hypercapnic resp failure and hyperkalemia secondary to fluid overload and responded to HD.  While admitted pt was found to be confused, MRI obtained revealed large acute infarct of the left PCA. Pt was originally on Asprin which was changed to Plavix. Patient was discharged to rehab on Plavix.    Above appreciated. EP called for consult to evaluate for possible LAAO with Watchman device. Pt seen and examined, daughter at beside. History obtained from daughter, patient is confused. Daughter is unaware of any h/o atrial fibrillation. Pt has never been on anticoagulation therapy, only aspirin therapy. Pt with known history of prior CVA (~30 years ago with mild residual right sided weakness) and past cardiac history of MI/CAD s/p PCI in 2007 follows with Dr Mosher in [place].  As per daughter, patient has not reported any recent complaints of CP, palpitation, or dizziness. No prior history of syncope or presyncope.  Daughter does report h/o SOB during and after dialysis over the last 6 months.       INTERVAL HX 5/5-5/6: Spoke with patient's primary cardiology office, office records faxed. Pt was last seen in 2019, no documentation of atrial fibrillation. Pt with episode of hypotension (SBP ~80) and AF with RVR overnight (HR~120-130bpm). Pt on midodrine s/p weaned off pressors.  BP responded to IVF hydration. Pt also s/p R IJ dialysis cath placed this AM.    A/P  1. Paroxysmal Atrial Fibrillation ([id number] = 6; age, sex, HTN, CVA, DM)  -Rate control with Metoprolol 12.5mg q6 (Hold for SPB <90); Discussed w/ hospitalist Dr Bob  -Would avoid Digoxin given ESRD on HD and concerns for digoxin toxicity.   -Patient with high [id number]. Would recommend anticoagulation therapy once cleared from Neurosurgical standpoint.   -Pt would benefit from LAAO with WATCHMAN device to avoid long term anticoagulation. Procedure discussed with daughter, who is agreeable. Will obtain cardiac CT while in-patient to evaluate DENIZ and Pt can followup with Dr Nguyen as out-pt to further discuss and plan.   -Cardiac CT ordered (pending)    2. Hypotension  -Responded to IVF hydration  -Currently on Midodrine   -Hypotension not likely related to AF w/RVR given rate only ~120-130bpm.

## 2021-05-06 NOTE — PROGRESS NOTE ADULT - SUBJECTIVE AND OBJECTIVE BOX
[facility]                                                               Office: [address] [address]                                                              Telephone: [phone]. Fax:[phone]                                                                             PROGRESS NOTE  Reason for follow up: CVA, Afib RVR  Overnight: hypotension overnight   Update: Saw pt earlier this am. wrist restraints in place.   Now s/p R IJ dialysis cath placed. plan for HD. full code. SBP stabilized. tele Afib RVR        Review of symptoms:   Cardiac:  No chest pain. No dyspnea. No palpitations.  Respiratory: no cough. No dyspnea  Gastrointestinal: No diarrhea. No abdominal pain. No bleeding.   Neuro: No focal neuro complaints.      Vitals:  T(C): 36.8 (05-06-21 @ 12:00), Max: 37.3 (05-06-21 @ 03:30)  HR: 92 (05-06-21 @ 12:00) (92 - 130)  BP: 104/50 (05-06-21 @ 12:00) (80/54 - 143/82)  RR: 18 (05-06-21 @ 12:00) (17 - 18)  SpO2: 97% (05-06-21 @ 12:00) (97% - 98%)      I&O's Summary    05 May 2021 07:01  -  06 May 2021 07:00  --------------------------------------------------------  IN: 0 mL / OUT: 0 mL / NET: 0 mL            PHYSICAL EXAM:  Appearance: No acute distress  HEENT:  Atraumatic. Normocephalic.   Neurologic: confused.   Cardiovascular: Normal S1 S2, No murmur, rubs/gallops.  Respiratory: Lungs clear to auscultation, unlabored   Gastrointestinal:  Soft, Non-tender, + BS  Lower Extremities: No edema  Psychiatry: Patient is calm. sleeping  Skin: No rashes/ ecchymoses/cyanosis/ulcers visualized on the face, hands or feet.      CURRENT MEDICATIONS:  midodrine. 10 milliGRAM(s) Oral three times a day    valproate sodium IVPB  pantoprazole   Suspension  polyethylene glycol 3350  senna  atorvastatin  dextrose 40% Gel  glucagon  Injectable  insulin lispro (ADMELOG) corrective regimen sliding scale  aspirin  chewable  dextrose 5%..  epoetin analilia-epbx (RETACRIT) Injectable  heparin   Injectable  Nephro-debbie      DIAGNOSTIC TESTING:  [ ] Echocardiogram:   < from: TTE Echo Complete w/o Contrast w/ Doppler (04.29.21 @ 10:05) >  Summary:   1. Technicallysuboptimal study.   2. Mildly enlarged left atrium.   3. Left ventricular ejection fraction, by visual estimation, is 45 to 50%.   4. Elevated mean left atrial pressure.   5. Mildly enlarged right atrium.   6. The right ventricular size is normal. Mildly reduced RV systolic function.   7. No significant valvular abnormality.   8. There is no evidence of pericardial effusion.   9. No cardiac mass, vegetations, thrombus or shunts visualized. However, LIBIA is a more sensitive test to evaluate for these findings.    MD Elizabeth, RPVI Electronically signed on 4/29/2021 at 5:59:53 PM      < end of copied text >      LABS:	 	                       8.9    12.37 )-----------( 247      ( 06 May 2021 10:13 )             31.0     05-06    144  |  106  |  45.0<H>  ----------------------------<  135<H>  5.5<H>   |  22.0  |  7.81<H>    Ca    8.6      06 May 2021 10:12  Phos  5.0     05-05  Mg     2.3     05-06    TPro  6.7  /  Alb  2.9<L>  /  TBili  0.3<L>  /  DBili  x   /  AST  24  /  ALT  8   /  AlkPhos  65  05-06      Lipid Profile: Date: 04-30 @ 04:55  Total cholesterol 106; Direct LDL: --; HDL: 40; Triglycerides:133      TELEMETRY: Afib RVR up to 130s

## 2021-05-06 NOTE — CONSULT NOTE ADULT - ASSESSMENT
72 y/o female with PMHx CAD s/p stents '07, HTN, MI, PAF, liver abscess, s/p biliary stent with removal (11/2018; 7/2/19), chronic pancreatitis, DM2 on insulin, diabetic neuropathy, ESRD (5/2018) on HD (M/W/F since 5/18) who presented as transfer from [facility] on 4/28/21 with syncope and AMS at dialysis. She had elevated WBC and sepsis workup was done.  She had event of unresponsiveness, left gaze preference and perseveration  Head CT showed evolving left PCA stroke with heme.  She was intubated for airway protection and transferred to [facility]. Transferred from neuro critical care service on 5/4. Now with hypotension and a-fib with RVR, most likely due to hypovolemia, poor po intake, protein caloric malnutrition.     Plan discussed with E-ICU attending Dr. Sorensen and Hospialist Dr. Mckeon     Problem List:  1) A-fib with RVR  2) hypovolemia   3) dehydration   4) CVA    At  this time patient has seemed to respond to IVF and BP is now 106/66 taken multiple times with all readings > 100 systolic  She is most likely dehydration and volume depleted given her poor po intake from stroke and refusing meds/oral intake  s/p 750ml IVF, would bolus another 500cc over 1-2 hours with plans for another 500cc bolus if patient tolerates  Given poor skin turgor, POCUS findings, and history of poor PO intake she can most likely tolerate some more IVF, she does make some urine  Encourage PO intake  Increase midodrine to 10mg q8 hours  A-fib rates are 110-120s, once BP improves a bit more, can dose 12.5mg lopressor PO as stated in cardiology note  Can also give one time dose of digoxin (renally dosed)   ASA 81mg for now until cleared by neuro given CVA with heme conversion this admission   Please reconsult if patient's status changes     70 y/o female with PMHx CAD s/p stents '07, HTN, MI, PAF, liver abscess, s/p biliary stent with removal (11/2018; 7/2/19), chronic pancreatitis, DM2 on insulin, diabetic neuropathy, ESRD (5/2018) on HD (M/W/F since 5/18) who presented as transfer from [facility] on 4/28/21 with syncope and AMS at dialysis. She had elevated WBC and sepsis workup was done.  She had event of unresponsiveness, left gaze preference and perseveration  Head CT showed evolving left PCA stroke with heme.  She was intubated for airway protection and transferred to [facility]. Transferred from neuro critical care service on 5/4. Now with hypotension and a-fib with RVR, most likely due to hypovolemia, poor po intake, protein caloric malnutrition.     Plan discussed with E-ICU attending Dr. Sorensen and Hospialist Dr. Mckeon     Problem List:  1) A-fib with RVR  2) hypovolemia   3) dehydration   4) CVA    At  this time patient has seemed to respond to IVF and BP is now 106/66 taken multiple times with all readings > 100 systolic  She is most likely dehydration and volume depleted given her poor po intake from stroke and refusing meds/oral intake  s/p 750ml IVF, would bolus another 500cc over 1-2 hours with plans for another 500cc bolus if patient tolerates  Given poor skin turgor, POCUS findings, and history of poor PO intake she can most likely tolerate some more IVF, she does make some urine  Encourage PO intake  Increase midodrine to 10mg q8 hours  A-fib rates are 110-120s, once BP improves a bit more, can dose 12.5mg lopressor PO as stated in cardiology note  Can also give one time dose of digoxin (renally dosed)   ASA 81mg for now until cleared by neuro given CVA with heme conversion this admission   Check CBC, CMP, mag, phos and replace any electrolyte deficits  Unlikely sepsis as she does not appear septic, afebrile. Can also check CXR   Please reconsult if patient's status changes

## 2021-05-06 NOTE — PROVIDER CONTACT NOTE (EICU) - BACKGROUND
72 y/o female with h/o DM2, HTN, CAD h/o MI with stents 2007, paroxysmal a.fib, h/o liver abscess, s/p biliary stent with removal, chronic pancreatitis, ESRD on HD who was transferred from [facility] on 4/28. Syncope and AMS while at dialysis. Dxed with acute CVA.  Rapid response for hypotension during a.fib with RVR, given IVF bolus of 750 ml and midodrine 70 y/o female with h/o DM2, HTN, CAD h/o MI with stents 2007, paroxysmal a.fib, h/o liver abscess, s/p biliary stent with removal, chronic pancreatitis, ESRD on HD who was transferred from [facility] on 4/28. Syncope and AMS while at dialysis. Dxed with acute CVA.  ICU consult for hypotension, given IVF bolus of 750 ml and midodrine

## 2021-05-06 NOTE — PROGRESS NOTE ADULT - ASSESSMENT
The patient is a 71y Female who is followed by neurology because of left PCA CVA with heme    Left PCA stroke with hemorrhagic conversion  This event occured over a week ago  LDL=39  continue ASA and lipitor  maintain normotension  history of PAF, suggest follow up head CT 5/12/2021 and if stable can start anticoagulation.  evaluated for Watchman, may be appropriate candidate  PT/OT      will follow with you    Kenny Wilson MD PhD   [id number]

## 2021-05-06 NOTE — PROGRESS NOTE ADULT - SUBJECTIVE AND OBJECTIVE BOX
IRENE TAYLOR    [id number]    71y      Female    INTERVAL HPI/OVERNIGHT EVENTS: patient beign seen for cva and esrd. Patient seen at bedside and is still confused.       REVIEW OF SYSTEMS:    unable to obtain secondary to mental status     Vital Signs Last 24 Hrs  T(C): 37.3 (06 May 2021 03:30), Max: 37.3 (06 May 2021 03:30)  T(F): 99.2 (06 May 2021 03:30), Max: 99.2 (06 May 2021 03:30)  HR: 97 (06 May 2021 07:34) (92 - 130)  BP: 143/82 (06 May 2021 07:34) (80/54 - 143/82)  BP(mean): --  RR: 18 (05 May 2021 23:15) (17 - 18)  SpO2: 98% (05 May 2021 23:15) (97% - 98%)    PHYSICAL EXAM:    GENERAL: agitated,   HEENT: PERRL, +EOMI  NECK: soft, Supple, No JVD,   CHEST/LUNG: Clear to auscultation bilaterally; No wheezing  HEART: S1S2+,   ABDOMEN: Soft, Nontender, Nondistended; Bowel sounds present  EXTREMITIES:  left femroal line   SKIN: No rashes or lesions  NEURO: AAOX1, follows commands        LABS:                        8.9    12.37 )-----------( 247      ( 06 May 2021 10:13 )             31.0     05-06    144  |  106  |  45.0<H>  ----------------------------<  135<H>  5.5<H>   |  22.0  |  7.81<H>    Ca    8.6      06 May 2021 10:12  Phos  5.0     05-05  Mg     2.3     05-06    TPro  6.7  /  Alb  2.9<L>  /  TBili  0.3<L>  /  DBili  x   /  AST  24  /  ALT  8   /  AlkPhos  65  05-06            MEDICATIONS  (STANDING):  aspirin  chewable 81 milliGRAM(s) Enteral Tube daily  atorvastatin 40 milliGRAM(s) Oral at bedtime  dextrose 40% Gel 15 Gram(s) Oral once  dextrose 5%. 1000 milliLiter(s) (50 mL/Hr) IV Continuous <Continuous>  dextrose 5%. 1000 milliLiter(s) (100 mL/Hr) IV Continuous <Continuous>  dextrose 50% Injectable 12.5 Gram(s) IV Push once  dextrose 50% Injectable 25 Gram(s) IV Push once  epoetin analilia-epbx (RETACRIT) Injectable 51013 Unit(s) IV Push <User Schedule>  glucagon  Injectable 1 milliGRAM(s) IntraMuscular once  heparin   Injectable 5000 Unit(s) SubCutaneous every 8 hours  insulin lispro (ADMELOG) corrective regimen sliding scale   SubCutaneous every 6 hours  midodrine. 10 milliGRAM(s) Oral three times a day  Nephro-debbie 1 Tablet(s) Oral daily  pantoprazole   Suspension 40 milliGRAM(s) Oral daily  polyethylene glycol 3350 17 Gram(s) Oral daily  senna 2 Tablet(s) Oral at bedtime  valproate sodium IVPB 500 milliGRAM(s) IV Intermittent every 12 hours    MEDICATIONS  (PRN):  acetaminophen    Suspension .. 650 milliGRAM(s) Oral every 6 hours PRN Temp greater or equal to 38C (100.4F), Mild Pain (1 - 3)      RADIOLOGY & ADDITIONAL TESTS:

## 2021-05-06 NOTE — PROGRESS NOTE ADULT - ASSESSMENT
72 y/o female with PMHx CAD s/p stents '07, HTN, MI, PAF, liver abscess, s/p biliary stent with removal (11/2018; 7/2/19), chronic pancreatitis, DM2 on insulin, diabetic neuropathy, ESRD (5/2018) on HD (M/W/F since 5/18) presents to [facility] as a transfer from [place] after she went to HD today with and had  an episode of syncope and change in mental status.     5/6- RRT hypotension overnight. Afib RVR. confusion.     HFrEF  -Echo-EF 45-50%, RV size is normal, mildly reduced RV sys. fx., no significant valvular abnormality, no evidence of pericardial effusion, no cardiac mass, vegetations, thrombus, or shunts visualized  -Cont. low dose BB   -d/c Lisinopril 2/2 MIRNA/hypotension    hypotension  -d/c lisinopril  -cont midodrine    pAfib  -CT Head- revealed evolving L posterior cerebral artery infarct with new hemorrhage  -EP recommends AC restart as per neuro; neuro will eval restarting AC after follow up CT next week  -Rate control for now with Metoprolol 12.5mg q6 (Hold for SPB <90)    CAD  -s/p cath 2007 stentx1  -Cont statin, ASA, and BB use

## 2021-05-06 NOTE — CHART NOTE - NSCHARTNOTEFT_GEN_A_CORE
Pt on midodrine s/p being weened off pressors  Pt has not been cooperative with taking midodrine  B/P now 82/50(MAP 61)  VSS B/P 82/50 P 100(MANUAL) R 18  pt NAD, lying in bed, breathing easy and unlabored  Alert , pleasant  appears comfortable  500 NS Bolus  Midodrine 2.5mg po  Continue to monitor Pt on midodrine s/p being weened off pressors  Pt has not been cooperative with taking midodrine  B/P now 82/50(MAP 61)  VSS B/P 82/50 P 100(MANUAL) R 18  pt NAD, lying in bed, breathing easy and unlabored  Alert , pleasant  appears comfortable  Lungs clear  Heart IRR tachy  capillary refill <2 sec   extremities warm  mentating well  500 NS Bolus  Midodrine 2.5mg po  Continue to monitor

## 2021-05-06 NOTE — CHART NOTE - NSCHARTNOTEFT_GEN_A_CORE
Pts B/P 80/54 P 130 T 99 (rectal) PO 98% RA R 18  Pt NAD Breathing easy and unlabored  Mentating well  skin warm, good capillary refill  Heart Irreg, tachy  Lungs clear  MICU PA called for consult and at bedside  Dr Mckeon at bedside  Pt NAD hemodynamically stable  CBC   Continuous pulse ox  repeat B/p 106/66 P 125  Midodrine 10mg po x 1  Normal saline 500cc bolus  Continue to monitor at this time

## 2021-05-06 NOTE — PROGRESS NOTE ADULT - SUBJECTIVE AND OBJECTIVE BOX
IRENE TAYLOR    [id number]    72yo AA   Female    INTERVAL HPI/ OVERNIGHT EVENTS: CVA, ESRD - HD,    Patient seen at bedside and is still confused.     REVIEW OF SYSTEMS:    unable to obtain secondary to mental status     Vital Signs Last 24 Hrs  T(C): 37.3 (06 May 2021 03:30), Max: 37.3 (06 May 2021 03:30)  T(F): 99.2 (06 May 2021 03:30), Max: 99.2 (06 May 2021 03:30)  HR: 97 (06 May 2021 07:34) (92 - 130)  BP: 143/82 (06 May 2021 07:34) (80/54 - 143/82)  RR: 18 (05 May 2021 23:15) (17 - 18)  SpO2: 98% (05 May 2021 23:15) (97% - 98%)    PHYSICAL EXAM:    GENERAL: agitated, Pale, Sallow,   HEENT: PERRL, +EOMI  NECK: soft, Supple, No JVD,   CHEST/LUNG: Clear to auscultation bilaterally; No wheezing  HEART: S1S2+,   ABDOMEN: Soft, Nontender, Nondistended; Bowel sounds present  EXTREMITIES:  L-   FVC ( No Tunneled )     SKIN: No rashes or lesions  NEURO: AAOX1, follows commands    LABS:                        8.9    12.37 )-----------( 247      ( 06 May 2021 10:13 )             31.0     05-06    144  |  106  |  45.0<H>  ----------------------------<  135<H>  5.5<H>   |  22.0  |  7.81<H>    Ca    8.6      06 May 2021 10:12  Phos  5.0     05-05  Mg     2.3     05-06    TPro  6.7  /  Alb  2.9<L>  /  TBili  0.3<L>  /  DBili  x   /  AST  24  /  ALT  8   /  AlkPhos  65  05-06    MEDICATIONS  (STANDING):  aspirin  chewable 81 milliGRAM(s) Enteral Tube daily  atorvastatin 40 milliGRAM(s) Oral at bedtime  dextrose 40% Gel 15 Gram(s) Oral once  dextrose 5%. 1000 milliLiter(s) (50 mL/Hr) IV Continuous <Continuous>  dextrose 5%. 1000 milliLiter(s) (100 mL/Hr) IV Continuous <Continuous>  dextrose 50% Injectable 12.5 Gram(s) IV Push once  dextrose 50% Injectable 25 Gram(s) IV Push once  epoetin analilia-epbx (RETACRIT) Injectable 32513 Unit(s) IV Push <User Schedule>  glucagon  Injectable 1 milliGRAM(s) IntraMuscular once  heparin   Injectable 5000 Unit(s) SubCutaneous every 8 hours  insulin lispro (ADMELOG) corrective regimen sliding scale   SubCutaneous every 6 hours  midodrine. 10 milliGRAM(s) Oral three times a day  Nephro-debbie 1 Tablet(s) Oral daily  pantoprazole   Suspension 40 milliGRAM(s) Oral daily  polyethylene glycol 3350 17 Gram(s) Oral daily  senna 2 Tablet(s) Oral at bedtime  valproate sodium IVPB 500 milliGRAM(s) IV Intermittent every 12 hours    MEDICATIONS  (PRN):  acetaminophen    Suspension .. 650 milliGRAM(s) Oral every 6 hours PRN Temp greater or equal to 38C (100.4F), Mild Pain (1 - 3)    RADIOLOGY & ADDITIONAL TESTS: All Reviewed,     #CVA - On ASA,     #ESRD - HD ( M W F )     #CAD, AF & CHF - rate controlled    Diet :  pureed Renal Diet,

## 2021-05-06 NOTE — PROGRESS NOTE ADULT - SUBJECTIVE AND OBJECTIVE BOX
HPI/OVERNIGHT EVENTS:  Overnight had rapid response called for hypotension and A.fib w RVR, was in NAD. Given midodrine and IVF with improvement. Seen at bedside with tight wrist restraints, concerning as overlying LUE AVF. Asked nurse to loosen/remove, consider protective mitts or supervision instead. Remains confused. Left groin site where shiley removed yesterday without mass, swelling, or active bleeding.     MEDICATIONS  (STANDING):  aspirin  chewable 81 milliGRAM(s) Enteral Tube daily  atorvastatin 40 milliGRAM(s) Oral at bedtime  dextrose 40% Gel 15 Gram(s) Oral once  dextrose 5%. 1000 milliLiter(s) (50 mL/Hr) IV Continuous <Continuous>  dextrose 5%. 1000 milliLiter(s) (100 mL/Hr) IV Continuous <Continuous>  dextrose 50% Injectable 12.5 Gram(s) IV Push once  dextrose 50% Injectable 25 Gram(s) IV Push once  epoetin analilia-epbx (RETACRIT) Injectable 05671 Unit(s) IV Push <User Schedule>  glucagon  Injectable 1 milliGRAM(s) IntraMuscular once  heparin   Injectable 5000 Unit(s) SubCutaneous every 8 hours  insulin lispro (ADMELOG) corrective regimen sliding scale   SubCutaneous every 6 hours  lisinopril 2.5 milliGRAM(s) Oral daily  midodrine. 10 milliGRAM(s) Oral three times a day  Nephro-debbie 1 Tablet(s) Oral daily  pantoprazole   Suspension 40 milliGRAM(s) Oral daily  polyethylene glycol 3350 17 Gram(s) Oral daily  senna 2 Tablet(s) Oral at bedtime  valproate sodium IVPB 500 milliGRAM(s) IV Intermittent every 12 hours    MEDICATIONS  (PRN):  acetaminophen    Suspension .. 650 milliGRAM(s) Oral every 6 hours PRN Temp greater or equal to 38C (100.4F), Mild Pain (1 - 3)      Vital Signs Last 24 Hrs  T(C): 37.3 (06 May 2021 03:30), Max: 37.3 (06 May 2021 03:30)  T(F): 99.2 (06 May 2021 03:30), Max: 99.2 (06 May 2021 03:30)  HR: 97 (06 May 2021 07:34) (92 - 130)  BP: 143/82 (06 May 2021 07:34) (80/54 - 143/82)  BP(mean): --  RR: 18 (05 May 2021 23:15) (17 - 18)  SpO2: 98% (05 May 2021 23:15) (96% - 98%)    Constitutional: laying in bed, in no acute distress. Confused w/ wrist restrains which were loosened  Neck: Full ROM without pain, supple  Respiratory: respirations are unlabored, no accessory muscle use  Cardiovascular: Irregular, tachycardic.   Gastrointestinal: Abdomen soft, non-tender, non-distended  Left groin former shiley site without ecchymosis swelling, or active bleeding.       I&O's Detail    05 May 2021 07:01  -  06 May 2021 07:00  --------------------------------------------------------  IN:  Total IN: 0 mL    OUT:    Other (mL): 0 mL  Total OUT: 0 mL    Total NET: 0 mL          LABS:                        8.9    11.92 )-----------( 255      ( 06 May 2021 04:18 )             29.9     05-06    141  |  101  |  45.0<H>  ----------------------------<  173<H>  5.2   |  20.0<L>  |  7.41<H>    Ca    8.5<L>      06 May 2021 04:18  Phos  5.0     05-05  Mg     2.3     05-06    TPro  6.4<L>  /  Alb  2.9<L>  /  TBili  0.3<L>  /  DBili  x   /  AST  21  /  ALT  8   /  AlkPhos  63  05-06

## 2021-05-06 NOTE — PROGRESS NOTE ADULT - ASSESSMENT
72 y/o F w/PMH CAD, DM, ESRD on HD, s/p cerebral hemorrhagic infarct, with LUE AVF occlusion at proximal cephalic outflow was admitted to NeuroICU now more stable and downgraded to floor. R femoral shiley removed yesterday, site without issues. Had rapid called last night for hypotension without acute distress, improved with IVF and midodrine. Plan for fistulogram and possible temporary dialysis catheter placement.        - Placement of temporary dialysis catheter, consented by daughter over phone.    - Fistulogram planning with possible intervention, date TBD  - Recommend use of mitts or supervision instead of wrist restraints given they are tight and would overly the LUE AVF that already has a thrombus.   - Remainder of care per primary.

## 2021-05-06 NOTE — PROGRESS NOTE ADULT - ASSESSMENT
#CVA - started on asa and hep sub q by neurosurgery team  - -q4hr neurochecks  - possible initiation of AC later - reassess in 1wk in view of large area of stroke with recent conversion as per neurosurgery (5/11)  - neuro follwoing    #esrd - renal and vascular following  - cw hd through femoral line  -vascular note appreciated  - eventual fistulogram     #chf /afib/cad- cardio following   - EP consult appreciated  - c.w tele   - rate controlled    #hypotension - midodrine increased to 10mg tid    #acute encephalopathy - secondary to cva  - supportive care    #dysphagia - pureed    prognosis guarded  pallaitve consult

## 2021-05-06 NOTE — PROGRESS NOTE ADULT - SUBJECTIVE AND OBJECTIVE BOX
[facility]                                     Neu[facility] at [place]                                 Steve Gamboa, & Farshad                                  [address]                                        [place], [place], [address]                                             [phone]    CC: stroke  HPI:  The patient is a 71y Female who presented as transfer from [facility] on 4/28/21 with syncope and AMS at dialysis. She had elevated WBC and sepsis workup was done.  She had event of unresponsiveness, left gaze preference and perseveration  Head CT showed evolving left PCA stroke with heme.  She was intubated for airway protection and transferred to [facility].  Yesterday she was downgraded from NSICU and neurology is asked ot evaluate today.    Interval history: low BP o/n,    Review of systems (neurology): unable to obtain     MEDICATIONS  (STANDING):  aspirin  chewable 81 milliGRAM(s) Enteral Tube daily  atorvastatin 40 milliGRAM(s) Oral at bedtime  chlorhexidine 4% Liquid 1 Application(s) Topical <User Schedule>  dextrose 40% Gel 15 Gram(s) Oral once  dextrose 5%. 1000 milliLiter(s) (50 mL/Hr) IV Continuous <Continuous>  dextrose 5%. 1000 milliLiter(s) (100 mL/Hr) IV Continuous <Continuous>  dextrose 50% Injectable 25 Gram(s) IV Push once  dextrose 50% Injectable 12.5 Gram(s) IV Push once  epoetin analilia-epbx (RETACRIT) Injectable 39655 Unit(s) IV Push <User Schedule>  glucagon  Injectable 1 milliGRAM(s) IntraMuscular once  heparin   Injectable 5000 Unit(s) SubCutaneous every 8 hours  insulin lispro (ADMELOG) corrective regimen sliding scale   SubCutaneous every 6 hours  metoprolol tartrate 12.5 milliGRAM(s) Oral every 6 hours  midodrine. 10 milliGRAM(s) Oral three times a day  Nephro-debbie 1 Tablet(s) Oral daily  pantoprazole   Suspension 40 milliGRAM(s) Oral daily  polyethylene glycol 3350 17 Gram(s) Oral daily  senna 2 Tablet(s) Oral at bedtime  valproate sodium IVPB 500 milliGRAM(s) IV Intermittent every 12 hours    MEDICATIONS  (PRN):  acetaminophen    Suspension .. 650 milliGRAM(s) Oral every 6 hours PRN Temp greater or equal to 38C (100.4F), Mild Pain (1 - 3)  LORazepam   Injectable 0.25 milliGRAM(s) IV Push every 6 hours PRN Agitation  sodium chloride 0.9% lock flush 10 milliLiter(s) IV Push every 1 hour PRN Pre/post blood products, medications, blood draw, and to maintain line patency      Vital Signs Last 24 Hrs  T(C): 36.8 (06 May 2021 12:00), Max: 37.3 (06 May 2021 03:30)  T(F): 98.3 (06 May 2021 12:00), Max: 99.2 (06 May 2021 03:30)  HR: 92 (06 May 2021 12:00) (92 - 130)  BP: 104/50 (06 May 2021 12:00) (80/54 - 143/82)  BP(mean): --  RR: 18 (06 May 2021 12:00) (18 - 18)  SpO2: 97% (06 May 2021 12:00) (97% - 98%)        Detailed Neurologic Exam:    Mental status: The patient is awake and alert and has dimininshedattention span.  The patient is confused per previous notes    Cranial nerves: Pupils equal and react symmetrically to light. There is no blink to threat on right.  Extraocular motion is full. There is no ptosis. Facial sensation is intact. Facial musculature is symmetric.    Motor: There is normal bulk and tone.  There is no tremor.  moves right less than left to stimuli    Sensation: grimace to light pinch b/l    Reflexes: 1+ throughout and plantar responses are equivocal.    Cerebellar: unable to assess dysmetria on finger to nose testing.    Gait : deferred    LABS:                           8.9    12.37 )-----------( 247      ( 06 May 2021 10:13 )             31.0     05-06    144  |  106  |  45.0<H>  ----------------------------<  135<H>  5.5<H>   |  22.0  |  7.81<H>    Ca    8.6      06 May 2021 10:12  Phos  5.0     05-05  Mg     2.3     05-06    TPro  6.7  /  Alb  2.9<L>  /  TBili  0.3<L>  /  DBili  x   /  AST  24  /  ALT  8   /  AlkPhos  65  05-06    LIVER FUNCTIONS - ( 06 May 2021 10:12 )  Alb: 2.9 g/dL / Pro: 6.7 g/dL / ALK PHOS: 65 U/L / ALT: 8 U/L / AST: 24 U/L / GGT: x               Lipid Profile in AM (04.30.21 @ 04:55)    Cholesterol, Serum: 106 mg/dL    Triglycerides, Serum: 133 mg/dL    HDL Cholesterol, Serum: 40 mg/dL    Non HDL Cholesterol: 66: Patient’s Atherosclerotic Cardiovascular Disease (ASCVD) Risk  Optimal Level (mg/dL)  LDL Cholesterol (LDL-C)  All Patients                                < 100  ASCVD at Very High Risk1    < 70  Non-HDL Cholesterol (Non-HDL-C)  All Patients                        < 130  ASCVD at Very High Risk1   < 100  Non-HDL-Cholesterol (Non-HDL-C) is also a key target for cardiovascular  risk reduction.  Consider Familial Hypercholesterolemia when: LDL-C > 190 mg/dL or  Non-HDL-C > 220 mg/dL.  LDL-C calculation using the Friedewald equation is not provided when  triglycerides > 400 mg/dL, in which case we recommend repeating the test  after fasting, if it was not done before.  When triglycerides >150 mg/dL, calculated LDL-C is provided but may still  be inaccurate (particularly when LDL-C < 70 mg/dL). It can be  recalculated off-line using other equations (e.g. Chai SS, Sage MJ,  Levi MB, et al.HUNTER 2013;310:2061 - 8).  1 Shahab Gonzalez.,et al. "2019 AHA/ACC. . . guideline on the  management of blood cholesterol: a report of the American College of  Cardiology/American Heart Association Task Force on Clinical Practice  Guidelines." Circulation;139:e1082 - e1143.  These values apply only to persons 20 years and older.  Lipid Panel updated with new test, reference ranges and interpretive  comments effective 10-. mg/dL    LDL Cholesterol Calculated: 39 mg/dL      RADIOLOGY & ADDITIONAL STUDIES (independently reviewed unless otherwise noted):  Head CT 5/3 showed left PCA stroke with hemorrhagic conversion, no mass noted

## 2021-05-06 NOTE — CHART NOTE - NSCHARTNOTEFT_GEN_A_CORE
Pts HR 120s  B/P 88/52  Asymptomatic  Dialysis pt  Discussed with Cardiology PA  Will give additional  250 NS Bolus  When b/p improves , will treat HR Pts HR 120s  B/P 88/52  Asymptomatic  Dialysis pt  Discussed with Cardiology PA  Will give additional  250 NS Bolus  midodrine 2.5mg po due now  When b/p improves , will treat HR

## 2021-05-06 NOTE — PROVIDER CONTACT NOTE (EICU) - ASSESSMENT
Acute CVA with hemorrhagic component  Intubated for airway protection  A.fib with RVR Acute CVA with hemorrhagic component  A.fib with RVR

## 2021-05-06 NOTE — PROGRESS NOTE ADULT - SUBJECTIVE AND OBJECTIVE BOX
72 y/o female with PMHx CVA, MI/CAD s/p stents '07, HTN, PAF, liver abscess, s/p biliary stent with removal (2018; 19), chronic pancreatitis, DM2 on insulin, diabetic neuropathy, ESRD (2018) on HD (M/W/F since ) presents to [facility] as a transfer from [place] after she went to HD today with and had  an episode of syncope and change in mental status. Patient had HD around 11am today, and 30 mins into HD patient vomited and had generalized weakness. HD was stopped, patient was awake and alert, and sepsis work up was done, WBC count 21K. Patient received 1st dose of vancomycin and RN reported she became unresponsive, L gaze preference, and was perseverating. Patient went for emergent CTH which revealed evolving L posterior cerebral artery infarct with new hemorrhage. Patient was subsequently intubated due to concern for deteriorating. Patient was then transferred to [facility].     Of note, patient was recently admitted to [place] on 21 for hypoxic/hypercapnic resp failure and hyperkalemia secondary to fluid overload and responded to HD.  While admitted pt was found to be confused, MRI obtained revealed large acute infarct of the left PCA. Pt was originally on Asprin which was changed to Plavix. Patient was discharged to rehab on Plavix.    Above appreciated. EP called for consult to evaluate for possible LAAO with Watchman device. Pt seen and examined, daughter at beside. History obtained from daughter, patient is confused. Daughter is unaware of any h/o atrial fibrillation. Pt has never been on anticoagulation therapy, only aspirin therapy. Pt with known history of prior CVA (~30 years ago with mild residual right sided weakness) and past cardiac history of MI/CAD s/p PCI in  follows with Dr Mosher in [place].  As per daughter, patient has not reported any recent complaints of CP, palpitation, or dizziness. No prior history of syncope or presyncope.  Daughter does report h/o SOB during and after dialysis over the last 6 months.     Daughter: Frederick [phone]  Cardiologist: Dr Mosher ([facility] in [place]) [phone]    Cardiology summary:   EK/28  22:52; SR, RBBB, LPFB  EK/28 14:49; AF @ 113bpm, RBBB, LPFB  EK/28  11:19 AF w/ RVR, RBBB, LPFB  EK/12, SR, RBBB, LPFB  EK/14; SR, RBBB, LPFB    INTERVAL HX -: Spoke with patient's primary cardiology office, office records faxed. Pt was last seen in , no documentation of atrial fibrillation. Pt with episode of hypotension (SBP ~80) and AF with RVR overnight (HR~120-130bpm). Pt on midodrine s/p weaned off pressors.  BP responded to IVF hydration. Pt also s/p R IJ dialysis cath placed this AM.    TELE: AF w/RVR (HR~120bpm)     MEDICATIONS  (STANDING):  aspirin  chewable 81 milliGRAM(s) Enteral Tube daily  atorvastatin 40 milliGRAM(s) Oral at bedtime  dextrose 40% Gel 15 Gram(s) Oral once  dextrose 5%. 1000 milliLiter(s) (50 mL/Hr) IV Continuous <Continuous>  dextrose 5%. 1000 milliLiter(s) (100 mL/Hr) IV Continuous <Continuous>  dextrose 50% Injectable 12.5 Gram(s) IV Push once  dextrose 50% Injectable 25 Gram(s) IV Push once  epoetin analilia-epbx (RETACRIT) Injectable 10894 Unit(s) IV Push <User Schedule>  glucagon  Injectable 1 milliGRAM(s) IntraMuscular once  heparin   Injectable 5000 Unit(s) SubCutaneous every 8 hours  insulin lispro (ADMELOG) corrective regimen sliding scale   SubCutaneous every 6 hours  metoprolol tartrate 12.5 milliGRAM(s) Oral two times a day  midodrine. 10 milliGRAM(s) Oral three times a day  Nephro-debbie 1 Tablet(s) Oral daily  pantoprazole   Suspension 40 milliGRAM(s) Oral daily  polyethylene glycol 3350 17 Gram(s) Oral daily  senna 2 Tablet(s) Oral at bedtime  valproate sodium IVPB 500 milliGRAM(s) IV Intermittent every 12 hours    MEDICATIONS  (PRN):  acetaminophen    Suspension .. 650 milliGRAM(s) Oral every 6 hours PRN Temp greater or equal to 38C (100.4F), Mild Pain (1 - 3)      Allergies  ertapenem (Urticaria)  Purell (Rash)      Vital Signs Last 24 Hrs  T(C): 36.8 (06 May 2021 12:00), Max: 37.3 (06 May 2021 03:30)  T(F): 98.3 (06 May 2021 12:00), Max: 99.2 (06 May 2021 03:30)  HR: 92 (06 May 2021 12:00) (92 - 130)  BP: 104/50 (06 May 2021 12:00) (80/54 - 143/82)  RR: 18 (06 May 2021 12:00) (17 - 18)  SpO2: 97% (06 May 2021 12:00) (97% - 98%)      Physical Exam:  Constitutional: Confused AAO x 1, NAD  Neck: supple, No JVD  Cardiovascular: +S1S2 irregular, no murmurs, rubs, gallops   Pulmonary: CTA b/l, unlabored, no wheezes, rales rhonci  Abdomen: +BS, soft NTND  Extremities: no edema     LABS:                        8.9    12.37 )-----------( 247      ( 06 May 2021 10:13 )             31.0     05-06    144  |  106  |  45.0<H>  ----------------------------<  135<H>  5.5<H>   |  22.0  |  7.81<H>    Ca    8.6      06 May 2021 10:12  Phos  5.0     05-05  Mg     2.3     05-06    TPro  6.7  /  Alb  2.9<L>  /  TBili  0.3<L>  /  DBili  x   /  AST  24  /  ALT  8   /  AlkPhos  65  05-06          RADIOLOGY & ADDITIONAL TESTS:   72 y/o female with PMHx CVA, MI/CAD s/p stents '07, HTN, PAF, liver abscess, s/p biliary stent with removal (2018; 19), chronic pancreatitis, DM2 on insulin, diabetic neuropathy, ESRD (2018) on HD (M/W/F since ) presents to [facility] as a transfer from [place] after she went to HD today with and had  an episode of syncope and change in mental status. Patient had HD around 11am today, and 30 mins into HD patient vomited and had generalized weakness. HD was stopped, patient was awake and alert, and sepsis work up was done, WBC count 21K. Patient received 1st dose of vancomycin and RN reported she became unresponsive, L gaze preference, and was perseverating. Patient went for emergent CTH which revealed evolving L posterior cerebral artery infarct with new hemorrhage. Patient was subsequently intubated due to concern for deteriorating. Patient was then transferred to [facility].     Of note, patient was recently admitted to [place] on 21 for hypoxic/hypercapnic resp failure and hyperkalemia secondary to fluid overload and responded to HD.  While admitted pt was found to be confused, MRI obtained revealed large acute infarct of the left PCA. Pt was originally on Asprin which was changed to Plavix. Patient was discharged to rehab on Plavix.    Above appreciated. EP called for consult to evaluate for possible LAAO with Watchman device. Pt seen and examined, daughter at beside. History obtained from daughter, patient is confused. Daughter is unaware of any h/o atrial fibrillation. Pt has never been on anticoagulation therapy, only aspirin therapy. Pt with known history of prior CVA (~30 years ago with mild residual right sided weakness) and past cardiac history of MI/CAD s/p PCI in  follows with Dr Mosher in [place].  As per daughter, patient has not reported any recent complaints of CP, palpitation, or dizziness. No prior history of syncope or presyncope.  Daughter does report h/o SOB during and after dialysis over the last 6 months.     Daughter: Frederick [phone]  Cardiologist: Dr Mosher ([facility] [place]) [phone]    Cardiology summary:   EK/28  22:52; SR, RBBB, LPFB  EK/28 14:49; AF @ 113bpm, RBBB, LPFB  EK/28  11:19 AF w/ RVR, RBBB, LPFB  EK/12, SR, RBBB, LPFB  EK/14; SR, RBBB, LPFB    INTERVAL HX -: Spoke with patient's primary cardiology office, office records faxed. Pt was last seen in , no documentation of atrial fibrillation. Pt with episode of hypotension (SBP ~80) and AF with RVR overnight (HR~120-130bpm). Pt on midodrine s/p weaned off pressors.  BP responded to IVF hydration. Pt also s/p R IJ dialysis cath placed this AM.    TELE: AF w/RVR (HR~120bpm) and SR. No conversion pauses.     MEDICATIONS  (STANDING):  aspirin  chewable 81 milliGRAM(s) Enteral Tube daily  atorvastatin 40 milliGRAM(s) Oral at bedtime  dextrose 40% Gel 15 Gram(s) Oral once  dextrose 5%. 1000 milliLiter(s) (50 mL/Hr) IV Continuous <Continuous>  dextrose 5%. 1000 milliLiter(s) (100 mL/Hr) IV Continuous <Continuous>  dextrose 50% Injectable 12.5 Gram(s) IV Push once  dextrose 50% Injectable 25 Gram(s) IV Push once  epoetin analilia-epbx (RETACRIT) Injectable 32964 Unit(s) IV Push <User Schedule>  glucagon  Injectable 1 milliGRAM(s) IntraMuscular once  heparin   Injectable 5000 Unit(s) SubCutaneous every 8 hours  insulin lispro (ADMELOG) corrective regimen sliding scale   SubCutaneous every 6 hours  metoprolol tartrate 12.5 milliGRAM(s) Oral two times a day  midodrine. 10 milliGRAM(s) Oral three times a day  Nephro-debbie 1 Tablet(s) Oral daily  pantoprazole   Suspension 40 milliGRAM(s) Oral daily  polyethylene glycol 3350 17 Gram(s) Oral daily  senna 2 Tablet(s) Oral at bedtime  valproate sodium IVPB 500 milliGRAM(s) IV Intermittent every 12 hours    MEDICATIONS  (PRN):  acetaminophen    Suspension .. 650 milliGRAM(s) Oral every 6 hours PRN Temp greater or equal to 38C (100.4F), Mild Pain (1 - 3)      Allergies  ertapenem (Urticaria)  Purell (Rash)      Vital Signs Last 24 Hrs  T(C): 36.8 (06 May 2021 12:00), Max: 37.3 (06 May 2021 03:30)  T(F): 98.3 (06 May 2021 12:00), Max: 99.2 (06 May 2021 03:30)  HR: 92 (06 May 2021 12:00) (92 - 130)  BP: 104/50 (06 May 2021 12:00) (80/54 - 143/82)  RR: 18 (06 May 2021 12:00) (17 - 18)  SpO2: 97% (06 May 2021 12:00) (97% - 98%)      Physical Exam:  Constitutional: Confused AAO x 1, NAD  Neck: supple, No JVD  Cardiovascular: +S1S2 irregular, no murmurs, rubs, gallops   Pulmonary: CTA b/l, unlabored, no wheezes, rales rhonci  Abdomen: +BS, soft NTND  Extremities: no edema     LABS:                        8.9    12.37 )-----------( 247      ( 06 May 2021 10:13 )             31.0     05-06    144  |  106  |  45.0<H>  ----------------------------<  135<H>  5.5<H>   |  22.0  |  7.81<H>    Ca    8.6      06 May 2021 10:12  Phos  5.0     05-05  Mg     2.3     05-06    TPro  6.7  /  Alb  2.9<L>  /  TBili  0.3<L>  /  DBili  x   /  AST  24  /  ALT  8   /  AlkPhos  65  05-06          RADIOLOGY & ADDITIONAL TESTS:

## 2021-05-07 LAB
ALBUMIN SERPL ELPH-MCNC: 3.1 G/DL — LOW (ref 3.3–5.2)
ALP SERPL-CCNC: 68 U/L — SIGNIFICANT CHANGE UP (ref 40–120)
ALT FLD-CCNC: 8 U/L — SIGNIFICANT CHANGE UP
ANION GAP SERPL CALC-SCNC: 18 MMOL/L — HIGH (ref 5–17)
AST SERPL-CCNC: 19 U/L — SIGNIFICANT CHANGE UP
BASOPHILS # BLD AUTO: 0.08 K/UL — SIGNIFICANT CHANGE UP (ref 0–0.2)
BASOPHILS NFR BLD AUTO: 0.7 % — SIGNIFICANT CHANGE UP (ref 0–2)
BILIRUB SERPL-MCNC: 0.3 MG/DL — LOW (ref 0.4–2)
BUN SERPL-MCNC: 54 MG/DL — HIGH (ref 8–20)
CALCIUM SERPL-MCNC: 9.1 MG/DL — SIGNIFICANT CHANGE UP (ref 8.6–10.2)
CHLORIDE SERPL-SCNC: 104 MMOL/L — SIGNIFICANT CHANGE UP (ref 98–107)
CO2 SERPL-SCNC: 23 MMOL/L — SIGNIFICANT CHANGE UP (ref 22–29)
CREAT SERPL-MCNC: 8.37 MG/DL — HIGH (ref 0.5–1.3)
EOSINOPHIL # BLD AUTO: 0.27 K/UL — SIGNIFICANT CHANGE UP (ref 0–0.5)
EOSINOPHIL NFR BLD AUTO: 2.3 % — SIGNIFICANT CHANGE UP (ref 0–6)
GLUCOSE BLDC GLUCOMTR-MCNC: 100 MG/DL — HIGH (ref 70–99)
GLUCOSE BLDC GLUCOMTR-MCNC: 144 MG/DL — HIGH (ref 70–99)
GLUCOSE BLDC GLUCOMTR-MCNC: 165 MG/DL — HIGH (ref 70–99)
GLUCOSE BLDC GLUCOMTR-MCNC: 211 MG/DL — HIGH (ref 70–99)
GLUCOSE SERPL-MCNC: 191 MG/DL — HIGH (ref 70–99)
HCT VFR BLD CALC: 31.7 % — LOW (ref 34.5–45)
HGB BLD-MCNC: 9.2 G/DL — LOW (ref 11.5–15.5)
IMM GRANULOCYTES NFR BLD AUTO: 5.1 % — HIGH (ref 0–1.5)
LYMPHOCYTES # BLD AUTO: 1.89 K/UL — SIGNIFICANT CHANGE UP (ref 1–3.3)
LYMPHOCYTES # BLD AUTO: 15.9 % — SIGNIFICANT CHANGE UP (ref 13–44)
MAGNESIUM SERPL-MCNC: 2.4 MG/DL — SIGNIFICANT CHANGE UP (ref 1.6–2.6)
MCHC RBC-ENTMCNC: 29 GM/DL — LOW (ref 32–36)
MCHC RBC-ENTMCNC: 29.1 PG — SIGNIFICANT CHANGE UP (ref 27–34)
MCV RBC AUTO: 100.3 FL — HIGH (ref 80–100)
MONOCYTES # BLD AUTO: 0.84 K/UL — SIGNIFICANT CHANGE UP (ref 0–0.9)
MONOCYTES NFR BLD AUTO: 7.1 % — SIGNIFICANT CHANGE UP (ref 2–14)
NEUTROPHILS # BLD AUTO: 8.2 K/UL — HIGH (ref 1.8–7.4)
NEUTROPHILS NFR BLD AUTO: 68.9 % — SIGNIFICANT CHANGE UP (ref 43–77)
PLATELET # BLD AUTO: 269 K/UL — SIGNIFICANT CHANGE UP (ref 150–400)
POTASSIUM SERPL-MCNC: 5.2 MMOL/L — SIGNIFICANT CHANGE UP (ref 3.5–5.3)
POTASSIUM SERPL-SCNC: 5.2 MMOL/L — SIGNIFICANT CHANGE UP (ref 3.5–5.3)
PROT SERPL-MCNC: 6.8 G/DL — SIGNIFICANT CHANGE UP (ref 6.6–8.7)
RBC # BLD: 3.16 M/UL — LOW (ref 3.8–5.2)
RBC # FLD: 15.5 % — HIGH (ref 10.3–14.5)
SODIUM SERPL-SCNC: 145 MMOL/L — SIGNIFICANT CHANGE UP (ref 135–145)
WBC # BLD: 11.89 K/UL — HIGH (ref 3.8–10.5)
WBC # FLD AUTO: 11.89 K/UL — HIGH (ref 3.8–10.5)

## 2021-05-07 PROCEDURE — 75572 CT HRT W/3D IMAGE: CPT | Mod: 26

## 2021-05-07 PROCEDURE — 99232 SBSQ HOSP IP/OBS MODERATE 35: CPT

## 2021-05-07 PROCEDURE — 90937 HEMODIALYSIS REPEATED EVAL: CPT

## 2021-05-07 PROCEDURE — 71045 X-RAY EXAM CHEST 1 VIEW: CPT | Mod: 26

## 2021-05-07 PROCEDURE — 99233 SBSQ HOSP IP/OBS HIGH 50: CPT

## 2021-05-07 RX ORDER — MIDODRINE HYDROCHLORIDE 2.5 MG/1
2.5 TABLET ORAL THREE TIMES A DAY
Refills: 0 | Status: DISCONTINUED | OUTPATIENT
Start: 2021-05-07 | End: 2021-05-13

## 2021-05-07 RX ADMIN — ERYTHROPOIETIN 10000 UNIT(S): 10000 INJECTION, SOLUTION INTRAVENOUS; SUBCUTANEOUS at 17:30

## 2021-05-07 RX ADMIN — HEPARIN SODIUM 5000 UNIT(S): 5000 INJECTION INTRAVENOUS; SUBCUTANEOUS at 06:46

## 2021-05-07 RX ADMIN — Medication 12.5 MILLIGRAM(S): at 23:17

## 2021-05-07 RX ADMIN — SENNA PLUS 2 TABLET(S): 8.6 TABLET ORAL at 21:48

## 2021-05-07 RX ADMIN — Medication 27.5 MILLIGRAM(S): at 06:47

## 2021-05-07 RX ADMIN — MIDODRINE HYDROCHLORIDE 10 MILLIGRAM(S): 2.5 TABLET ORAL at 06:46

## 2021-05-07 RX ADMIN — CHLORHEXIDINE GLUCONATE 1 APPLICATION(S): 213 SOLUTION TOPICAL at 06:53

## 2021-05-07 RX ADMIN — Medication 27.5 MILLIGRAM(S): at 21:48

## 2021-05-07 RX ADMIN — Medication 2: at 06:58

## 2021-05-07 RX ADMIN — ATORVASTATIN CALCIUM 40 MILLIGRAM(S): 80 TABLET, FILM COATED ORAL at 21:48

## 2021-05-07 RX ADMIN — HEPARIN SODIUM 5000 UNIT(S): 5000 INJECTION INTRAVENOUS; SUBCUTANEOUS at 12:25

## 2021-05-07 RX ADMIN — Medication 12.5 MILLIGRAM(S): at 06:47

## 2021-05-07 RX ADMIN — HEPARIN SODIUM 5000 UNIT(S): 5000 INJECTION INTRAVENOUS; SUBCUTANEOUS at 21:48

## 2021-05-07 RX ADMIN — Medication 4: at 12:26

## 2021-05-07 NOTE — PROGRESS NOTE ADULT - SUBJECTIVE AND OBJECTIVE BOX
72 y/o female with PMHx CVA, MI/CAD s/p stents '07, HTN, PAF, liver abscess, s/p biliary stent with removal (2018; 19), chronic pancreatitis, DM2 on insulin, diabetic neuropathy, ESRD (2018) on HD (M/W/F since ) presents to [facility] as a transfer from [place] after she went to HD today with and had  an episode of syncope and change in mental status. Patient had HD around 11am today, and 30 mins into HD patient vomited and had generalized weakness. HD was stopped, patient was awake and alert, and sepsis work up was done, WBC count 21K. Patient received 1st dose of vancomycin and RN reported she became unresponsive, L gaze preference, and was perseverating. Patient went for emergent CTH which revealed evolving L posterior cerebral artery infarct with new hemorrhage. Patient was subsequently intubated due to concern for deteriorating. Patient was then transferred to [facility].     Of note, patient was recently admitted to [place] on 21 for hypoxic/hypercapnic resp failure and hyperkalemia secondary to fluid overload and responded to HD.  While admitted pt was found to be confused, MRI obtained revealed large acute infarct of the left PCA. Pt was originally on Asprin which was changed to Plavix. Patient was discharged to rehab on Plavix.    Above appreciated. EP called for consult to evaluate for possible LAAO with Watchman device. Pt seen and examined, daughter at beside. History obtained from daughter, patient is confused. Daughter is unaware of any h/o atrial fibrillation. Pt has never been on anticoagulation therapy, only aspirin therapy. Pt with known history of prior CVA (~30 years ago with mild residual right sided weakness) and past cardiac history of MI/CAD s/p PCI in  follows with Dr Mosher in [place].  As per daughter, patient has not reported any recent complaints of CP, palpitation, or dizziness. No prior history of syncope or presyncope.  Daughter does report h/o SOB during and after dialysis over the last 6 months.     Daughter: Frederick [phone]  Cardiologist: Dr Mosher ([facility] in [place]) [phone]    Cardiology summary:   EK/28  22:52; SR, RBBB, LPFB  EK/28 14:49; AF @ 113bpm, RBBB, LPFB  EK/28  11:19 AF w/ RVR, RBBB, LPFB  EK/12, SR, RBBB, LPFB  EK/14; SR, RBBB, LPFB    INTERVAL HX -: Spoke with patient's primary cardiology office, office records faxed. Pt was last seen in , no documentation of atrial fibrillation. Pt with episode of hypotension (SBP ~80) and AF with RVR overnight (HR~120-130bpm). Pt on midodrine s/p weaned off pressors.  BP responded to IVF hydration. Pt also s/p R IJ dialysis cath placed this AM.    TELE: AF w/RVR (HR~120bpm) and SR. No conversion pauses.     MEDICATIONS  (STANDING):  aspirin  chewable 81 milliGRAM(s) Enteral Tube daily  atorvastatin 40 milliGRAM(s) Oral at bedtime  dextrose 40% Gel 15 Gram(s) Oral once  dextrose 5%. 1000 milliLiter(s) (50 mL/Hr) IV Continuous <Continuous>  dextrose 5%. 1000 milliLiter(s) (100 mL/Hr) IV Continuous <Continuous>  dextrose 50% Injectable 12.5 Gram(s) IV Push once  dextrose 50% Injectable 25 Gram(s) IV Push once  epoetin analilia-epbx (RETACRIT) Injectable 52561 Unit(s) IV Push <User Schedule>  glucagon  Injectable 1 milliGRAM(s) IntraMuscular once  heparin   Injectable 5000 Unit(s) SubCutaneous every 8 hours  insulin lispro (ADMELOG) corrective regimen sliding scale   SubCutaneous every 6 hours  metoprolol tartrate 12.5 milliGRAM(s) Oral two times a day  midodrine. 10 milliGRAM(s) Oral three times a day  Nephro-debbie 1 Tablet(s) Oral daily  pantoprazole   Suspension 40 milliGRAM(s) Oral daily  polyethylene glycol 3350 17 Gram(s) Oral daily  senna 2 Tablet(s) Oral at bedtime  valproate sodium IVPB 500 milliGRAM(s) IV Intermittent every 12 hours    MEDICATIONS  (PRN):  acetaminophen    Suspension .. 650 milliGRAM(s) Oral every 6 hours PRN Temp greater or equal to 38C (100.4F), Mild Pain (1 - 3)      Allergies  ertapenem (Urticaria)  Purell (Rash)      Vital Signs Last 24 Hrs  T(C): 36.8 (06 May 2021 12:00), Max: 37.3 (06 May 2021 03:30)  T(F): 98.3 (06 May 2021 12:00), Max: 99.2 (06 May 2021 03:30)  HR: 92 (06 May 2021 12:00) (92 - 130)  BP: 104/50 (06 May 2021 12:00) (80/54 - 143/82)  RR: 18 (06 May 2021 12:00) (17 - 18)  SpO2: 97% (06 May 2021 12:00) (97% - 98%)      Physical Exam:  Constitutional: Confused AAO x 1, NAD  Neck: supple, No JVD  Cardiovascular: +S1S2 irregular, no murmurs, rubs, gallops   Pulmonary: CTA b/l, unlabored, no wheezes, rales rhonci  Abdomen: +BS, soft NTND  Extremities: no edema     LABS:                        8.9    12.37 )-----------( 247      ( 06 May 2021 10:13 )             31.0     05-06    144  |  106  |  45.0<H>  ----------------------------<  135<H>  5.5<H>   |  22.0  |  7.81<H>    Ca    8.6      06 May 2021 10:12  Phos  5.0     05-05  Mg     2.3     05-06    TPro  6.7  /  Alb  2.9<L>  /  TBili  0.3<L>  /  DBili  x   /  AST  24  /  ALT  8   /  AlkPhos  65  05-06          RADIOLOGY & ADDITIONAL TESTS:   70 y/o female with PMHx CVA, MI/CAD s/p stents '07, HTN, PAF, liver abscess, s/p biliary stent with removal (2018; 19), chronic pancreatitis, DM2 on insulin, diabetic neuropathy, ESRD (2018) on HD (M/W/F since ) presents to [facility] as a transfer from [place] after she went to HD today with and had  an episode of syncope and change in mental status. Patient had HD around 11am today, and 30 mins into HD patient vomited and had generalized weakness. HD was stopped, patient was awake and alert, and sepsis work up was done, WBC count 21K. Patient received 1st dose of vancomycin and RN reported she became unresponsive, L gaze preference, and was perseverating. Patient went for emergent CTH which revealed evolving L posterior cerebral artery infarct with new hemorrhage. Patient was subsequently intubated due to concern for deteriorating. Patient was then transferred to [facility].     Of note, patient was recently admitted to [place] on 21 for hypoxic/hypercapnic resp failure and hyperkalemia secondary to fluid overload and responded to HD.  While admitted pt was found to be confused, MRI obtained revealed large acute infarct of the left PCA. Pt was originally on Asprin which was changed to Plavix. Patient was discharged to rehab on Plavix.    Above appreciated. EP called for consult to evaluate for possible LAAO with Watchman device. Pt seen and examined, daughter at beside. History obtained from daughter, patient is confused. Daughter is unaware of any h/o atrial fibrillation. Pt has never been on anticoagulation therapy, only aspirin therapy. Pt with known history of prior CVA (~30 years ago with mild residual right sided weakness) and past cardiac history of MI/CAD s/p PCI in  follows with Dr Mosher in [place].  As per daughter, patient has not reported any recent complaints of CP, palpitation, or dizziness. No prior history of syncope or presyncope.  Daughter does report h/o SOB during and after dialysis over the last 6 months.     Daughter: Frederick [phone]  Cardiologist: Dr Mosher ([facility] in [place]) [phone]    Cardiology summary:   EK/28  22:52; SR, RBBB, LPFB  EK/28 14:49; AF @ 113bpm, RBBB, LPFB  EK/28  11:19 AF w/ RVR, RBBB, LPFB  EK/12, SR, RBBB, LPFB  EK/14; SR, RBBB, LPFB    INTERVAL HX -: Spoke with patient's primary cardiology office, office records faxed. Pt was last seen in , no documentation of atrial fibrillation. Pt with episode of hypotension (SBP ~80) and AF with RVR overnight (HR~120-130bpm). Pt on midodrine s/p weaned off pressors.  BP responded to IVF hydration. Pt also s/p R IJ dialysis cath placed this AM.    Interval Hx -7  sinus with PACs, got NGT, remains critically ill with delirium. Cardiac CT pending        TELE: sinus with PACs       MEDICATIONS  (STANDING):  aspirin  chewable 81 milliGRAM(s) Enteral Tube daily  atorvastatin 40 milliGRAM(s) Oral at bedtime  chlorhexidine 4% Liquid 1 Application(s) Topical <User Schedule>  dextrose 40% Gel 15 Gram(s) Oral once  dextrose 5%. 1000 milliLiter(s) (50 mL/Hr) IV Continuous <Continuous>  dextrose 5%. 1000 milliLiter(s) (100 mL/Hr) IV Continuous <Continuous>  dextrose 50% Injectable 25 Gram(s) IV Push once  dextrose 50% Injectable 12.5 Gram(s) IV Push once  epoetin analilia-epbx (RETACRIT) Injectable 51497 Unit(s) IV Push <User Schedule>  glucagon  Injectable 1 milliGRAM(s) IntraMuscular once  heparin   Injectable 5000 Unit(s) SubCutaneous every 8 hours  insulin lispro (ADMELOG) corrective regimen sliding scale   SubCutaneous every 6 hours  metoprolol tartrate 12.5 milliGRAM(s) Oral every 6 hours  midodrine. 2.5 milliGRAM(s) Oral three times a day  Nephro-debbie 1 Tablet(s) Oral daily  pantoprazole   Suspension 40 milliGRAM(s) Oral daily  polyethylene glycol 3350 17 Gram(s) Oral daily  senna 2 Tablet(s) Oral at bedtime  valproate sodium IVPB 500 milliGRAM(s) IV Intermittent every 12 hours    MEDICATIONS  (PRN):  acetaminophen    Suspension .. 650 milliGRAM(s) Oral every 6 hours PRN Temp greater or equal to 38C (100.4F), Mild Pain (1 - 3)  LORazepam   Injectable 0.25 milliGRAM(s) IV Push every 6 hours PRN Agitation  sodium chloride 0.9% lock flush 10 milliLiter(s) IV Push every 1 hour PRN Pre/post blood products, medications, blood draw, and to maintain line patency      Allergies  ertapenem (Urticaria)  Purell (Rash)      Vital Signs Last 24 Hrs  T(C): 36.9 (07 May 2021 04:56), Max: 37.1 (06 May 2021 18:28)  T(F): 98.5 (07 May 2021 04:56), Max: 98.7 (06 May 2021 18:28)  HR: 89 (07 May 2021 04:56) (89 - 95)  BP: 172/83 (07 May 2021 04:56) (104/50 - 172/83)  BP(mean): --  RR: 20 (07 May 2021 04:56) (18 - 20)  SpO2: 97% (07 May 2021 04:56) (96% - 97%)      Physical Exam:  Constitutional: Confused AAO x 1, NAD  Neck: supple, No JVD  Cardiovascular: +S1S2, RRR, no murmurs, rubs, gallops   Pulmonary: CTA b/l, unlabored, no wheezes, rales rhonci  Abdomen: +BS, soft NTND  Extremities: no edema     LABS:                        9.2    11.89 )-----------( 269      ( 07 May 2021 08:03 )             31.7   05-07    145  |  104  |  54.0<H>  ----------------------------<  191<H>  5.2   |  23.0  |  8.37<H>    Ca    9.1      07 May 2021 08:03  Phos  5.0     05-05  Mg     2.4     05-07    TPro  6.8  /  Alb  3.1<L>  /  TBili  0.3<L>  /  DBili  x   /  AST  19  /  ALT  8   /  AlkPhos  68  05-07        RADIOLOGY & ADDITIONAL TESTS:

## 2021-05-07 NOTE — PROGRESS NOTE ADULT - ASSESSMENT
70 y/o female with PMHx CAD s/p stents '07, HTN, MI, PAF, liver abscess, s/p biliary stent with removal (11/2018; 7/2/19), chronic pancreatitis, DM2 on insulin, diabetic neuropathy, ESRD (5/2018) on HD (M/W/F since 5/18) presents to [facility] as a transfer from [place] after she went to HD today with and had  an episode of syncope and change in mental status.     5/7- Pt denies chest pain, palpitations, SOB. Tele- AFIB HR @ 80-120s with episodes of HR of 130-150s. Attempt to wean off midodrine. Restart low dose lisinopril when BP WNL. EP recommends AC restart as per neuro; neuro will eval restarting AC after follow up CT next week.       HFrEF  -Echo-EF 45-50%, RV size is normal, mildly reduced RV sys. fx., no significant valvular abnormality, no evidence of pericardial effusion, no cardiac mass, vegetations, thrombus, or shunts visualized  -Cont. low dose BB   -Lisinopril is currently dc'd d/t hypotension     Hypotension  -d/c lisinopril  -Attempt to wean off midodrine if BP permits     pAfib  -CT Head- revealed evolving L posterior cerebral artery infarct with new hemorrhage  -EP recommends AC restart as per neuro; neuro will eval restarting AC after follow up CT next week  -Rate control for now with Metoprolol 12.5mg q6 (Hold for SPB <90)    CAD  -s/p cath 2007 stentx1  -Cont statin, ASA, and BB use

## 2021-05-07 NOTE — PROGRESS NOTE ADULT - SUBJECTIVE AND OBJECTIVE BOX
[facility] Faculty Practice                                                               Office: [address], [place] [address]                                                              Telephone: [phone]. Fax:[phone]                                                                             PROGRESS NOTE  Reason for follow up: CVA with hemorrhagic transformation  Overnight: No new events.   Update: 5/7- Pt denies chest pain, palpitations, SOB. Tele- AFIB HR @ 80-120s with episodes of HR of 130-150s.   Attempt to wean off midodrine. Restart low dose lisinopril when BP WNL. EP recommends AC restart as per neuro; neuro will eval restarting AC after follow up CT next week.     Subjective: No new events    	  Vitals:  T(C): 36.5 (05-07-21 @ 11:00), Max: 37.1 (05-06-21 @ 18:28)  HR: 111 (05-07-21 @ 11:00) (89 - 111)  BP: 109/83 (05-07-21 @ 11:00) (109/83 - 172/83)  RR: 19 (05-07-21 @ 11:00) (18 - 20)  SpO2: 96% (05-07-21 @ 11:00) (96% - 97%)    I&O's Summary    06 May 2021 07:01  -  07 May 2021 07:00  --------------------------------------------------------  IN: 20 mL / OUT: 0 mL / NET: 20 mL    07 May 2021 07:01  -  07 May 2021 14:15  --------------------------------------------------------  IN: 60 mL / OUT: 0 mL / NET: 60 mL            PHYSICAL EXAM:  Appearance: Comfortable. No acute distress  HEENT:  Head and neck: Atraumatic. Normocephalic.  Normal oral mucosa, PERRL, Neck is supple. No JVD, No carotid bruit.   Neurologic: A & O x 0, lethargic, no focal deficits. EOMI  Lymphatic: No cervical lymphadenopathy  Cardiovascular: Normal S1 S2, No murmur, rubs/gallops. No JVD, No edema  Respiratory: Lungs clear to auscultation  Gastrointestinal:  Soft, Non-tender, + BS  Lower Extremities: No edema  Psychiatry: Patient is calm. No agitation. Mood & affect appropriate  Skin: No rashes/ ecchymoses/cyanosis/ulcers visualized on the face, hands or feet.      CURRENT MEDICATIONS:  metoprolol tartrate 12.5 milliGRAM(s) Oral every 6 hours  midodrine. 2.5 milliGRAM(s) Oral three times a day  LORazepam     Tablet  valproate sodium IVPB  pantoprazole   Suspension  polyethylene glycol 3350  senna  atorvastatin  dextrose 40% Gel  dextrose 50% Injectable  dextrose 50% Injectable  glucagon  Injectable  insulin lispro (ADMELOG) corrective regimen sliding scale  aspirin  chewable  chlorhexidine 4% Liquid  dextrose 5%.  dextrose 5%.  epoetin analilia-epbx (RETACRIT) Injectable  heparin   Injectable  Nephro-debbie      DIAGNOSTIC TESTING:    [ ] Echocardiogram: < from: TTE Echo Complete w/o Contrast w/ Doppler (04.29.21 @ 10:05) >  Summary:   1. Technicallysuboptimal study.   2. Mildly enlarged left atrium.   3. Left ventricular ejection fraction, by visual estimation, is 45 to 50%.   4. Elevated mean left atrial pressure.   5. Mildly enlarged right atrium.   6. The right ventricular size is normal. Mildly reduced RV systolic function.   7. No significant valvular abnormality.   8. There is no evidence of pericardial effusion.   9. No cardiac mass, vegetations, thrombus or shunts visualized. However, LIBIA is a more sensitive test to evaluate for these findings.    LABS:	 	                            9.2    11.89 )-----------( 269      ( 07 May 2021 08:03 )             31.7     05-07    145  |  104  |  54.0<H>  ----------------------------<  191<H>  5.2   |  23.0  |  8.37<H>    Ca    9.1      07 May 2021 08:03  Mg     2.4     05-07    TPro  6.8  /  Alb  3.1<L>  /  TBili  0.3<L>  /  DBili  x   /  AST  19  /  ALT  8   /  AlkPhos  68  05-07        TELEMETRY: AFIB HR @ 80-120s with episodes of HR of 130-150s

## 2021-05-07 NOTE — DIETITIAN NUTRITION RISK NOTIFICATION - ADDITIONAL COMMENTS/DIETITIAN RECOMMENDATIONS
1) TF consult received, diet order pending MD approval  2) Continue SLP intervention as feasible  3) Continue Nephro-debbie daily   4) Monitor weights daily for trend/accuracy

## 2021-05-07 NOTE — CHART NOTE - NSCHARTNOTEFT_GEN_A_CORE
Source: Patient [ ]  Family [ ]   other [ ]    Current Diet:   Diet, NPO with Tube Feed:   Tube Feeding Modality: Nasogastric  Nepro (NEPRORTH)  Total Volume for 24 Hours (mL): 480  Continuous  Starting Tube Feed Rate {mL per Hour}: 10  Until Goal Tube Feed Rate (mL per Hour): 20  Tube Feed Duration (in Hours): 24  Tube Feed Start Time: 20:00 (05-06-21 @ 17:13)    Enteral /Parenteral Nutrition: Current TF order reads for Nepro 1.8cal @ goal rate 20mL/hr (x 20 hrs) to provide 400mL daily (720cal, 32g protein)    Current Weight:   (5/5)    190.2 lbs  (5/3)    363.3 lbs  (5/1)    365.3 lbs  (4/30)   364.8 lbs  (4/29)  196.6 lbs    % Weight Change: Question accuracy of weights, weights 363-365lbs appear inaccurate    Pertinent Medications: MEDICATIONS  (STANDING):  aspirin  chewable 81 milliGRAM(s) Enteral Tube daily  atorvastatin 40 milliGRAM(s) Oral at bedtime  chlorhexidine 4% Liquid 1 Application(s) Topical <User Schedule>  dextrose 40% Gel 15 Gram(s) Oral once  dextrose 5%. 1000 milliLiter(s) (50 mL/Hr) IV Continuous <Continuous>  dextrose 5%. 1000 milliLiter(s) (100 mL/Hr) IV Continuous <Continuous>  dextrose 50% Injectable 25 Gram(s) IV Push once  dextrose 50% Injectable 12.5 Gram(s) IV Push once  epoetin analilia-epbx (RETACRIT) Injectable 11707 Unit(s) IV Push <User Schedule>  glucagon  Injectable 1 milliGRAM(s) IntraMuscular once  heparin   Injectable 5000 Unit(s) SubCutaneous every 8 hours  insulin lispro (ADMELOG) corrective regimen sliding scale   SubCutaneous every 6 hours  metoprolol tartrate 12.5 milliGRAM(s) Oral every 6 hours  midodrine. 2.5 milliGRAM(s) Oral three times a day  Nephro-debbie 1 Tablet(s) Oral daily  pantoprazole   Suspension 40 milliGRAM(s) Oral daily  polyethylene glycol 3350 17 Gram(s) Oral daily  senna 2 Tablet(s) Oral at bedtime  valproate sodium IVPB 500 milliGRAM(s) IV Intermittent every 12 hours    MEDICATIONS  (PRN):  acetaminophen    Suspension .. 650 milliGRAM(s) Oral every 6 hours PRN Temp greater or equal to 38C (100.4F), Mild Pain (1 - 3)  LORazepam   Injectable 0.25 milliGRAM(s) IV Push every 6 hours PRN Agitation  sodium chloride 0.9% lock flush 10 milliLiter(s) IV Push every 1 hour PRN Pre/post blood products, medications, blood draw, and to maintain line patency    Pertinent Labs: CBC Full  -  ( 07 May 2021 08:03 )  WBC Count : 11.89 K/uL  RBC Count : 3.16 M/uL  Hemoglobin : 9.2 g/dL  Hematocrit : 31.7 %  Platelet Count - Automated : 269 K/uL  Mean Cell Volume : 100.3 fl  Mean Cell Hemoglobin : 29.1 pg  Mean Cell Hemoglobin Concentration : 29.0 gm/dL  Auto Neutrophil # : 8.20 K/uL  Auto Lymphocyte # : 1.89 K/uL  Auto Monocyte # : 0.84 K/uL  Auto Eosinophil # : 0.27 K/uL  Auto Basophil # : 0.08 K/uL  Auto Neutrophil % : 68.9 %  Auto Lymphocyte % : 15.9 %  Auto Monocyte % : 7.1 %  Auto Eosinophil % : 2.3 %  Auto Basophil % : 0.7 %  05-07 Na145 mmol/L Glu 191 mg/dL<H> K+ 5.2 mmol/L Cr  8.37 mg/dL<H> BUN 54.0 mg/dL<H> Phos n/a   Alb 3.1 g/dL<L> PAB n/a       Skin: 2+ generalized edema     Nutrition focused physical exam conducted - found signs of malnutrition [ ]absent [ ]present    Subcutaneous fat loss: [ ] Orbital fat pads region, [ ]Buccal fat region, [ ]Triceps region,  [ ]Ribs region    Muscle wasting: [ ]Temples region, [ ]Clavicle region, [ ]Shoulder region, [ ]Scapula region, [ ]Interosseous region,  [ ]thigh region, [ ]Calf region    Estimated Needs:   [ ] no change since previous assessment  [ ] recalculated:     Current Nutrition Diagnosis: Pt at high nutrition risk secondary to malnutrition (moderate acute) related to inability to consume sufficient protein-energy needs 2/2 ESRD on HD, s/p SDH, failed bedside SLP eval with continued refusal to accept PO as evidenced by meeting <50% EER x 5 days, 2+ generalized edema likely underlying weight loss. Pt refusing PO intake x 2 days from staff and family. Pt failed bedside SLP eval 5/6 with NGT placed per MD. Pt s/p R IJ cath placement. Aware consult for TF recommendations, order pending MD approval.     Recommendations:   1) As medically feasible, recommend Nepro -- initiated at 20 ml/hr and advance 10 ml/hr q4 hrs until goal rate of 55 ml/hr (x20 hrs) to provide 1100 ml, 1980 kcal, 89g protein, 800 ml free water, and >100% of RDIs for vitamins/minerals. Additional free water per MD discretion.   2) Continue Nephro-debbie daily   3) Monitor TF tolerance/bowel function  4) Continue SLP intervention as feasible  5) Monitor weights daily for trend/accuracy     Monitoring and Evaluation:   [ ] PO intake [x] Tolerance to diet prescription [X] Weights  [X] Follow up per protocol [X] Labs:

## 2021-05-07 NOTE — PROGRESS NOTE ADULT - SUBJECTIVE AND OBJECTIVE BOX
[facility] DIVISION OF KIDNEY DISEASES AND HYPERTENSION -- FOLLOW UP NOTE  --------------------------------------------------------------------------------  Chief Complaint:  ESRD HD  24 hour events/subjective:  Seen/examined  HD today      PAST HISTORY  --------------------------------------------------------------------------------  No significant changes to PMH, PSH, FHx, SHx, unless otherwise noted    ALLERGIES & MEDICATIONS  --------------------------------------------------------------------------------  Allergies    ertapenem (Urticaria)  Purell (Rash)    Intolerances      Standing Inpatient Medications  aspirin  chewable 81 milliGRAM(s) Enteral Tube daily  atorvastatin 40 milliGRAM(s) Oral at bedtime  chlorhexidine 4% Liquid 1 Application(s) Topical <User Schedule>  dextrose 40% Gel 15 Gram(s) Oral once  dextrose 5%. 1000 milliLiter(s) IV Continuous <Continuous>  dextrose 5%. 1000 milliLiter(s) IV Continuous <Continuous>  dextrose 50% Injectable 25 Gram(s) IV Push once  dextrose 50% Injectable 12.5 Gram(s) IV Push once  epoetin analilia-epbx (RETACRIT) Injectable 52039 Unit(s) IV Push <User Schedule>  glucagon  Injectable 1 milliGRAM(s) IntraMuscular once  heparin   Injectable 5000 Unit(s) SubCutaneous every 8 hours  insulin lispro (ADMELOG) corrective regimen sliding scale   SubCutaneous every 6 hours  metoprolol tartrate 12.5 milliGRAM(s) Oral every 6 hours  midodrine. 2.5 milliGRAM(s) Oral three times a day  Nephro-debbie 1 Tablet(s) Oral daily  pantoprazole   Suspension 40 milliGRAM(s) Oral daily  polyethylene glycol 3350 17 Gram(s) Oral daily  senna 2 Tablet(s) Oral at bedtime  valproate sodium IVPB 500 milliGRAM(s) IV Intermittent every 12 hours    PRN Inpatient Medications  acetaminophen    Suspension .. 650 milliGRAM(s) Oral every 6 hours PRN  LORazepam   Injectable 0.25 milliGRAM(s) IV Push every 6 hours PRN  sodium chloride 0.9% lock flush 10 milliLiter(s) IV Push every 1 hour PRN      REVIEW OF SYSTEMS  --------------------------------------------------------------------------------  Gen: No weight changes, fatigue, fevers/chills, weakness  Skin: No rashes  Head/Eyes/Ears/Mouth: No headache; Normal hearing; Normal vision w/o blurriness; No sinus pain/discomfort, sore throat  Respiratory: No dyspnea, cough, wheezing, hemoptysis  CV: No chest pain, PND, orthopnea  GI: No abdominal pain, diarrhea, constipation, nausea, vomiting, melena, hematochezia  : No increased frequency, dysuria, hematuria, nocturia  MSK: No joint pain/swelling; no back pain; no edema  Neuro: No dizziness/lightheadedness, weakness, seizures, numbness, tingling  Heme: No easy bruising or bleeding  Endo: No heat/cold intolerance  Psych: No significant nervousness, anxiety, stress, depression    All other systems were reviewed and are negative, except as noted.    VITALS/PHYSICAL EXAM  --------------------------------------------------------------------------------  T(C): 36.9 (05-07-21 @ 04:56), Max: 37.1 (05-06-21 @ 18:28)  HR: 89 (05-07-21 @ 04:56) (89 - 95)  BP: 172/83 (05-07-21 @ 04:56) (110/60 - 172/83)  RR: 20 (05-07-21 @ 04:56) (18 - 20)  SpO2: 97% (05-07-21 @ 04:56) (96% - 97%)  Wt(kg): --        05-06-21 @ 07:01  -  05-07-21 @ 07:00  --------------------------------------------------------  IN: 20 mL / OUT: 0 mL / NET: 20 mL    05-07-21 @ 07:01  -  05-07-21 @ 12:41  --------------------------------------------------------  IN: 60 mL / OUT: 0 mL / NET: 60 mL      Physical Exam:  	Gen: NAD   	HEENT: PERRL, supple neck, clear oropharynx  	Pulm: CTA B/L  	CV: RRR, S1S2; no rub  	Back: No spinal or CVA tenderness; no sacral edema  	Abd: +BS, soft, nontender/nondistended  	: No suprapubic tenderness  	UE: Warm, FROM, no clubbing, intact strength; no edema; no asterixis  	LE: Warm, FROM, no clubbing, intact strength; no edema  	Neuro: No focal deficits, intact gait  	Psych: Normal affect and mood  	Skin: Warm, without rashes  	Vascular access:    LABS/STUDIES  --------------------------------------------------------------------------------              9.2    11.89 >-----------<  269      [05-07-21 @ 08:03]              31.7     145  |  104  |  54.0  ----------------------------<  191      [05-07-21 @ 08:03]  5.2   |  23.0  |  8.37        Ca     9.1     [05-07-21 @ 08:03]      Mg     2.4     [05-07-21 @ 08:03]    TPro  6.8  /  Alb  3.1  /  TBili  0.3  /  DBili  x   /  AST  19  /  ALT  8   /  AlkPhos  68  [05-07-21 @ 08:03]          Creatinine Trend:  SCr 8.37 [05-07 @ 08:03]  SCr 7.81 [05-06 @ 10:12]  SCr 7.41 [05-06 @ 04:18]  SCr 7.54 [05-05 @ 10:33]  SCr 7.51 [05-05 @ 09:16]        HbA1c 9.4      [02-07-20 @ 21:14]  TSH 1.15      [04-14-21 @ 08:13]  Lipid: chol 106, , HDL 40, LDL --      [04-30-21 @ 04:55]    HBsAb <3.0      [04-13-21 @ 02:54]  HBsAg Nonreact      [04-13-21 @ 02:54]  HCV 0.22, Nonreact      [04-13-21 @ 02:54]

## 2021-05-07 NOTE — PROGRESS NOTE ADULT - PROBLEM SELECTOR PLAN 4
Discussed case with PA on [address] and with Dr. Bob  Patient now with NGT for feeds  Continues to be on HD  Code status: Full Code (CPR intubation)  Social work reached out to family today for goals on discharge planning - family wants to pursue return to [facility] and HD   Will continue to support and follow    Total Time Spent__35__ minutes  This includes chart review, patient assessment, discussion and collaboration with interdisciplinary team members, ACP planning    Thank you for the opportunity to assist with the care of this patient.   [facility] [phone].

## 2021-05-07 NOTE — PROGRESS NOTE ADULT - SUBJECTIVE AND OBJECTIVE BOX
IRENE TAYLOR    [id number]    71y      Female    INTERVAL HPI/OVERNIGHT EVENTS:       Patient is a 72 y/o female with PMHx of CVA 3 weeks prior to admission, cardiac arrest,  CAD s/p stents '07, HTN, MI, Parosysmal afib, liver abscess, s/p biliary stent with removal (11/2018; 7/2/19), chronic pancreatitis, DM2 on insulin, diabetic neuropathy, ESRD (5/2018) on HD (M/W/F since 5/18) presents to [facility] as a transfer from [facility] after she went to HD with and had  an episode of syncope and change in mental status. The HD RN reported she became unresponsive, developed a L gaze preference, and was perseverating. Patient went for emergent CTH which revealed evolving L posterior cerebral artery infarct with new hemorrhage. Patient was subsequently intubated due to concern for deteriorating and then transferred to [facility].     Of note, patient was recently admitted to [facility] on 4/12/21 for hypoxic/hypercapnic resp failure and hyperkalemia secondary to ESRD. While admitted pt was found to be confused, MRI obtained revealed large acute infarct of the left PCA. Pt was originally on Asprin which was changed to Plavix. Patient was discharged to rehab on Plavix.    Patient brought to NSICU and seen by nephro and continued on HD. Patient was extubated on 5/1. Patient seen by vascular for low flow out of the fistula (with eventual plan for fistulogram) and had a left femoral placed for HD.     Patient also had tte    Summary:   1. Technically suboptimal study.   2. Mildly enlarged left atrium.   3. Left ventricular ejection fraction, by visual estimation, is 45 to 50%.    Patient downgraded to medicine on 5/4 and cardio, EP, palliative and neuro consulted. Patient also started on low dose aspirin and hep sub q with the plan being a repeat ct head on 5/11 as per Neurosurgery.     5/6- ng tube placed and tube feeds started.   5/6 - vascular placed a left neck IJ for HD    REVIEW OF SYSTEMS:    unable to obtain secondary to mental status     Vital Signs Last 24 Hrs  T(C): 36.9 (07 May 2021 04:56), Max: 37.1 (06 May 2021 18:28)  T(F): 98.5 (07 May 2021 04:56), Max: 98.7 (06 May 2021 18:28)  HR: 89 (07 May 2021 04:56) (89 - 95)  BP: 172/83 (07 May 2021 04:56) (104/50 - 172/83)  BP(mean): --  RR: 20 (07 May 2021 04:56) (18 - 20)  SpO2: 97% (07 May 2021 04:56) (96% - 97%)    PHYSICAL EXAM:    GENERAL: NAD, confused  HEENT: ng tube   NECK: soft, Supple, No JVD, left IJ   CHEST/LUNG: Clear to auscultation bilaterally; No wheezing  HEART: S1S2+,   ABDOMEN: Soft, Nontender, Nondistended; Bowel sounds present  EXTREMITIES:  no edema   SKIN: No rashes or lesions  NEURO: Awake but not alert.       LABS:                        9.2    11.89 )-----------( 269      ( 07 May 2021 08:03 )             31.7     05-07    145  |  104  |  54.0<H>  ----------------------------<  191<H>  5.2   |  23.0  |  8.37<H>    Ca    9.1      07 May 2021 08:03  Phos  5.0     05-05  Mg     2.4     05-07    TPro  6.8  /  Alb  3.1<L>  /  TBili  0.3<L>  /  DBili  x   /  AST  19  /  ALT  8   /  AlkPhos  68  05-07      MEDICATIONS  (STANDING):  aspirin  chewable 81 milliGRAM(s) Enteral Tube daily  atorvastatin 40 milliGRAM(s) Oral at bedtime  chlorhexidine 4% Liquid 1 Application(s) Topical <User Schedule>  dextrose 40% Gel 15 Gram(s) Oral once  dextrose 5%. 1000 milliLiter(s) (50 mL/Hr) IV Continuous <Continuous>  dextrose 5%. 1000 milliLiter(s) (100 mL/Hr) IV Continuous <Continuous>  dextrose 50% Injectable 25 Gram(s) IV Push once  dextrose 50% Injectable 12.5 Gram(s) IV Push once  epoetin analilia-epbx (RETACRIT) Injectable 21148 Unit(s) IV Push <User Schedule>  glucagon  Injectable 1 milliGRAM(s) IntraMuscular once  heparin   Injectable 5000 Unit(s) SubCutaneous every 8 hours  insulin lispro (ADMELOG) corrective regimen sliding scale   SubCutaneous every 6 hours  metoprolol tartrate 12.5 milliGRAM(s) Oral every 6 hours  midodrine. 2.5 milliGRAM(s) Oral three times a day  Nephro-debbie 1 Tablet(s) Oral daily  pantoprazole   Suspension 40 milliGRAM(s) Oral daily  polyethylene glycol 3350 17 Gram(s) Oral daily  senna 2 Tablet(s) Oral at bedtime  valproate sodium IVPB 500 milliGRAM(s) IV Intermittent every 12 hours    MEDICATIONS  (PRN):  acetaminophen    Suspension .. 650 milliGRAM(s) Oral every 6 hours PRN Temp greater or equal to 38C (100.4F), Mild Pain (1 - 3)  LORazepam   Injectable 0.25 milliGRAM(s) IV Push every 6 hours PRN Agitation  sodium chloride 0.9% lock flush 10 milliLiter(s) IV Push every 1 hour PRN Pre/post blood products, medications, blood draw, and to maintain line patency      RADIOLOGY & ADDITIONAL TESTS:

## 2021-05-07 NOTE — PROGRESS NOTE ADULT - PROBLEM SELECTOR PLAN 2
Nephrology following  Patient on HD since 2018 - family admits they've noticed a decline with tolerance towards HD over the past 6 months she becomes unstable during treatments (daughter in law stated specifically with hypotension)   Patient would be hospice eligible if family no longer wants to pursue HD.

## 2021-05-07 NOTE — PROGRESS NOTE ADULT - ASSESSMENT
72 y/o female with PMHx CVA, MI/CAD s/p stents '07, HTN, PAF, liver abscess, s/p biliary stent with removal (11/2018; 7/2/19), chronic pancreatitis, DM2 on insulin, diabetic neuropathy, ESRD (5/2018) on HD (M/W/F since 5/18) presents to [facility] as a transfer from [place] after she went to HD today with and had  an episode of syncope and change in mental status. Patient had HD around 11am today, and 30 mins into HD patient vomited and had generalized weakness. HD was stopped, patient was awake and alert, and sepsis work up was done, WBC count 21K. Patient received 1st dose of vancomycin and RN reported she became unresponsive, L gaze preference, and was perseverating. Patient went for emergent CTH which revealed evolving L posterior cerebral artery infarct with new hemorrhage. Patient was subsequently intubated due to concern for deteriorating. Patient was then transferred to [facility].     Of note, patient was recently admitted to [place] on 4/12/21 for hypoxic/hypercapnic resp failure and hyperkalemia secondary to fluid overload and responded to HD.  While admitted pt was found to be confused, MRI obtained revealed large acute infarct of the left PCA. Pt was originally on Asprin which was changed to Plavix. Patient was discharged to rehab on Plavix.    Above appreciated. EP called for consult to evaluate for possible LAAO with Watchman device. Pt seen and examined, daughter at beside. History obtained from daughter, patient is confused. Daughter is unaware of any h/o atrial fibrillation. Pt has never been on anticoagulation therapy, only aspirin therapy. Pt with known history of prior CVA (~30 years ago with mild residual right sided weakness) and past cardiac history of MI/CAD s/p PCI in 2007 follows with Dr Mosher in [place].  As per daughter, patient has not reported any recent complaints of CP, palpitation, or dizziness. No prior history of syncope or presyncope.  Daughter does report h/o SOB during and after dialysis over the last 6 months.       INTERVAL HX 5/5-5/6: Spoke with patient's primary cardiology office, office records faxed. Pt was last seen in 2019, no documentation of atrial fibrillation. Pt with episode of hypotension (SBP ~80) and AF with RVR overnight (HR~120-130bpm). Pt on midodrine s/p weaned off pressors.  BP responded to IVF hydration. Pt also s/p R IJ dialysis cath placed this AM.    A/P  1. Paroxysmal Atrial Fibrillation ([id number] = 6; age, sex, HTN, CVA, DM)  -Rate control with Metoprolol 12.5mg q6 (Hold for SPB <90); Discussed w/ hospitalist Dr Bob  -Would avoid Digoxin given ESRD on HD and concerns for digoxin toxicity.   -Patient with high [id number]. Would recommend anticoagulation therapy once cleared from Neurosurgical standpoint.   -Pt would benefit from LAAO with WATCHMAN device to avoid long term anticoagulation. Procedure discussed with daughter, who is agreeable. Will obtain cardiac CT while in-patient to evaluate DENIZ and Pt can followup with Dr Nguyen as out-pt to further discuss and plan.   -Cardiac CT ordered (pending)    2. Hypotension  -Responded to IVF hydration  -Currently on Midodrine   -Hypotension not likely related to AF w/RVR given rate only ~120-130bpm.    70 y/o female with PMHx CVA, MI/CAD s/p stents '07, HTN, PAF, liver abscess, s/p biliary stent with removal (11/2018; 7/2/19), chronic pancreatitis, DM2 on insulin, diabetic neuropathy, ESRD (5/2018) on HD (M/W/F since 5/18) presents to [facility] as a transfer from [place] after she went to HD today with and had  an episode of syncope and change in mental status. Patient had HD around 11am today, and 30 mins into HD patient vomited and had generalized weakness. HD was stopped, patient was awake and alert, and sepsis work up was done, WBC count 21K. Patient received 1st dose of vancomycin and RN reported she became unresponsive, L gaze preference, and was perseverating. Patient went for emergent CTH which revealed evolving L posterior cerebral artery infarct with new hemorrhage. Patient was subsequently intubated due to concern for deteriorating. Patient was then transferred to [facility].     Of note, patient was recently admitted to [place] on 4/12/21 for hypoxic/hypercapnic resp failure and hyperkalemia secondary to fluid overload and responded to HD.  While admitted pt was found to be confused, MRI obtained revealed large acute infarct of the left PCA. Pt was originally on Asprin which was changed to Plavix. Patient was discharged to rehab on Plavix.    Above appreciated. EP called for consult to evaluate for possible LAAO with Watchman device. Pt seen and examined, daughter at beside. History obtained from daughter, patient is confused. Daughter is unaware of any h/o atrial fibrillation. Pt has never been on anticoagulation therapy, only aspirin therapy. Pt with known history of prior CVA (~30 years ago with mild residual right sided weakness) and past cardiac history of MI/CAD s/p PCI in 2007 follows with Dr Mosher in [place].  As per daughter, patient has not reported any recent complaints of CP, palpitation, or dizziness. No prior history of syncope or presyncope.  Daughter does report h/o SOB during and after dialysis over the last 6 months.       INTERVAL HX 5/5-5/6: Spoke with patient's primary cardiology office, office records faxed. Pt was last seen in 2019, no documentation of atrial fibrillation. Pt with episode of hypotension (SBP ~80) and AF with RVR overnight (HR~120-130bpm). Pt on midodrine s/p weaned off pressors.  BP responded to IVF hydration. Pt also s/p R IJ dialysis cath placed this AM.      Interval Hx 5/6-7  sinus with PACs, got NGT, remains critically ill with delirium. Cardiac CT pending      A/P  1. Paroxysmal Atrial Fibrillation ([id number] = 6; age, sex, HTN, CVA, DM)  -Rate control with Metoprolol 12.5mg q6 (Hold for SPB <90); Discussed w/ hospitalist Dr Bob  -Would avoid Digoxin given ESRD on HD and concerns for digoxin toxicity. Agree with DR. De La Rosa to use one dose at 0.5 mg IV if prolonged AF with RVR and soft BP  -Patient with high [id number]. Would recommend anticoagulation therapy once cleared from Neurosurgical standpoint. Neuro has a plan to repeat CT before advising on AC, will defer to them   - NOT a candidate for rhythm control  - EPS sign off, pls call us prior to discharge to discuss follow up based on her clinical status and goals of care  -Pt would benefit from LAAO with WATCHMAN device to avoid long term anticoagulation. Procedure discussed with daughter, who is agreeable. Will obtain cardiac CT while in-patient to evaluate DENIZ and Pt can followup with Dr Nguyen as out-pt to further discuss and plan.   -Cardiac CT ordered (pending)    2. Hypotension  -Responded to IVF hydration  -Currently on Midodrine   -Hypotension not likely related to AF w/RVR given rate only ~120-130bpm.

## 2021-05-07 NOTE — PROGRESS NOTE ADULT - SUBJECTIVE AND OBJECTIVE BOX
Palliative Followup  OVERNIGHT EVENTS: no acute chart event notes - with NGT - HD planned for today    CC:cva    Present Symptoms:   Dyspnea: 0   Nausea/Vomiting: Unable to obtain due to poor mentation  Anxiety:  Unable to obtain due to poor mentation  Depression: Unable to obtain due to poor mentation  Fatigue: Yes   Loss of appetite: Unable to obtain due to poor mentation    Pain: Appeared comfortable during assessment            Character-            Duration-            Effect-            Factors-            Frequency-            Location-            Severity-    Review of Systems: Reviewed  Per HPI, all other ROS negative  Unable to obtain due to poor mentation     MEDICATIONS  (STANDING):  aspirin  chewable 81 milliGRAM(s) Enteral Tube daily  atorvastatin 40 milliGRAM(s) Oral at bedtime  chlorhexidine 4% Liquid 1 Application(s) Topical <User Schedule>  dextrose 40% Gel 15 Gram(s) Oral once  dextrose 5%. 1000 milliLiter(s) (50 mL/Hr) IV Continuous <Continuous>  dextrose 5%. 1000 milliLiter(s) (100 mL/Hr) IV Continuous <Continuous>  dextrose 50% Injectable 25 Gram(s) IV Push once  dextrose 50% Injectable 12.5 Gram(s) IV Push once  epoetin analilia-epbx (RETACRIT) Injectable 95255 Unit(s) IV Push <User Schedule>  glucagon  Injectable 1 milliGRAM(s) IntraMuscular once  heparin   Injectable 5000 Unit(s) SubCutaneous every 8 hours  insulin lispro (ADMELOG) corrective regimen sliding scale   SubCutaneous every 6 hours  LORazepam     Tablet 0.5 milliGRAM(s) Oral once  metoprolol tartrate 12.5 milliGRAM(s) Oral every 6 hours  midodrine. 2.5 milliGRAM(s) Oral three times a day  Nephro-debbie 1 Tablet(s) Oral daily  pantoprazole   Suspension 40 milliGRAM(s) Oral daily  polyethylene glycol 3350 17 Gram(s) Oral daily  senna 2 Tablet(s) Oral at bedtime  valproate sodium IVPB 500 milliGRAM(s) IV Intermittent every 12 hours    MEDICATIONS  (PRN):  acetaminophen    Suspension .. 650 milliGRAM(s) Oral every 6 hours PRN Temp greater or equal to 38C (100.4F), Mild Pain (1 - 3)  LORazepam   Injectable 0.25 milliGRAM(s) IV Push every 6 hours PRN Agitation  sodium chloride 0.9% lock flush 10 milliLiter(s) IV Push every 1 hour PRN Pre/post blood products, medications, blood draw, and to maintain line patency      Vital Signs Last 24 Hrs  T(C): 36.9 (07 May 2021 04:56), Max: 37.1 (06 May 2021 18:28)  T(F): 98.5 (07 May 2021 04:56), Max: 98.7 (06 May 2021 18:28)  HR: 89 (07 May 2021 04:56) (89 - 95)  BP: 172/83 (07 May 2021 04:56) (110/60 - 172/83)  BP(mean): --  RR: 20 (07 May 2021 04:56) (18 - 20)  SpO2: 97% (07 May 2021 04:56) (96% - 97%)    General: alert _ confused    Karnofsky:  %__30    HEENT: dry mouth    Lungs: comfortable    CV: tachycardia    GI: incontinent     : incontinent    MSK: weakness  edema generalized     Skin: thin skin, clamy , cool    LABS:                        9.2    11.89 )-----------( 269      ( 07 May 2021 08:03 )             31.7     05-07    145  |  104  |  54.0<H>  ----------------------------<  191<H>  5.2   |  23.0  |  8.37<H>    Ca    9.1      07 May 2021 08:03  Mg     2.4     05-07    TPro  6.8  /  Alb  3.1<L>  /  TBili  0.3<L>  /  DBili  x   /  AST  19  /  ALT  8   /  AlkPhos  68  05-07        I&O's Summary    06 May 2021 07:01  -  07 May 2021 07:00  --------------------------------------------------------  IN: 20 mL / OUT: 0 mL / NET: 20 mL    07 May 2021 07:01  -  07 May 2021 13:53  --------------------------------------------------------  IN: 60 mL / OUT: 0 mL / NET: 60 mL      RADIOLOGY & ADDITIONAL STUDIES:  CT head 05.03.21  FINDINGS: An evolving left-sided PCA distribution infarction is again seen withareas of gyral hyperattenuation. Findings may indicate superimposed petechial hemorrhagic transformation versus cortical laminar necrosis versus a combination of both.  No new areas of acute intracranial hemorrhage are seen.  Multiple small chronic infarcts are noted within the right cerebellar hemisphere. There is no hydrocephalus.  There is diffuse cerebral volume loss with prominence of the sulci, fissures, and cisternal spaces which is normal for the patient's age. There is mild periventricular white matter hypoattenuation statistically compatible with microvascular changes given calcific atherosclerotic disease of the intracranial arteries.  The paranasal sinuses and mastoid air cells are clear. The calvarium appears intact. The orbits appear unremarkable apart from cataract removal.  IMPRESSION: Stable follow-up CT examination.    ADVANCE DIRECTIVES: FULL CODE

## 2021-05-07 NOTE — PROGRESS NOTE ADULT - ASSESSMENT
70 y/o female with PMHx of CVA 3 weeks prior to admission, cardiac arrest,  CAD s/p stents '07, HTN, MI, Parosysmal afib, liver abscess, s/p biliary stent with removal (11/2018; 7/2/19), chronic pancreatitis, DM2 on insulin, diabetic neuropathy, ESRD (5/2018) on HD (M/W/F since 5/18) presents to [facility] as a transfer from [place] after she went to HD with and had  an episode of syncope and change in mental status. The HD RN reported she became unresponsive, developed a L gaze preference, and was perseverating. Patient went for emergent CTH which revealed evolving L posterior cerebral artery infarct with new hemorrhage. Patient was subsequently intubated due to concern for deteriorating and then transferred to [facility].     #Acute metabolic encephalopathy - secondary to multiple cvas in the past month  -asa and statin and depakote  --> neurology following  --> as per neurosurgery repeat ct head on 5.11 to reassess on when to start AC  - not improving  --> ng tube placed  --.> ammonia tsh WNL    #CVA - in setting of afib  plan as above    #dm2 - elevated a1c  - c.w ssi    #afib - spoke to cardio and ep  --> c.w metoprolol q6h  - -> no dig as per EP   --> decision on AC on 5/11     #esrd - renal following   c.w HD  --> vascular - eventual fistulogram    #diet - ng tube feeds   nutrition consult     prognosis poor   palliative following  - family wants full code

## 2021-05-07 NOTE — PROGRESS NOTE ADULT - ASSESSMENT
IJ Dialysis  Catheter - Insertion Planned,    Eventual Fistulogram & AVF,    HD today post cardiac CT    dw [place] PA

## 2021-05-07 NOTE — DIETITIAN NUTRITION RISK NOTIFICATION - TREATMENT: THE FOLLOWING DIET HAS BEEN RECOMMENDED
Diet, NPO with Tube Feed:   Tube Feeding Modality: Nasogastric  Nepro (NEPRORTH)  Total Volume for 24 Hours (mL): 1100  Continuous  Starting Tube Feed Rate {mL per Hour}: 20  Increase Tube Feed Rate by (mL): 10     Every 4 hours  Until Goal Tube Feed Rate (mL per Hour): 55  Tube Feed Duration (in Hours): 20  Tube Feed Start Time: 10:00  Tube Feed Stop Time: 06:00 (05-07-21 @ 09:35) [Pending Verification By Attending]  Diet, NPO with Tube Feed:   Tube Feeding Modality: Nasogastric  Nepro (NEPRORTH)  Total Volume for 24 Hours (mL): 480  Continuous  Starting Tube Feed Rate {mL per Hour}: 10  Until Goal Tube Feed Rate (mL per Hour): 20  Tube Feed Duration (in Hours): 24  Tube Feed Start Time: 20:00 (05-06-21 @ 17:13) [Active]

## 2021-05-08 LAB
ALBUMIN SERPL ELPH-MCNC: 2.8 G/DL — LOW (ref 3.3–5.2)
ALP SERPL-CCNC: 63 U/L — SIGNIFICANT CHANGE UP (ref 40–120)
ALT FLD-CCNC: 6 U/L — SIGNIFICANT CHANGE UP
ANION GAP SERPL CALC-SCNC: 19 MMOL/L — HIGH (ref 5–17)
AST SERPL-CCNC: 20 U/L — SIGNIFICANT CHANGE UP
BASOPHILS # BLD AUTO: 0.07 K/UL — SIGNIFICANT CHANGE UP (ref 0–0.2)
BASOPHILS NFR BLD AUTO: 0.6 % — SIGNIFICANT CHANGE UP (ref 0–2)
BILIRUB SERPL-MCNC: 0.3 MG/DL — LOW (ref 0.4–2)
BUN SERPL-MCNC: 23 MG/DL — HIGH (ref 8–20)
CALCIUM SERPL-MCNC: 8.3 MG/DL — LOW (ref 8.6–10.2)
CHLORIDE SERPL-SCNC: 101 MMOL/L — SIGNIFICANT CHANGE UP (ref 98–107)
CO2 SERPL-SCNC: 24 MMOL/L — SIGNIFICANT CHANGE UP (ref 22–29)
CREAT SERPL-MCNC: 4.57 MG/DL — HIGH (ref 0.5–1.3)
EOSINOPHIL # BLD AUTO: 0.24 K/UL — SIGNIFICANT CHANGE UP (ref 0–0.5)
EOSINOPHIL NFR BLD AUTO: 2 % — SIGNIFICANT CHANGE UP (ref 0–6)
GLUCOSE BLDC GLUCOMTR-MCNC: 136 MG/DL — HIGH (ref 70–99)
GLUCOSE BLDC GLUCOMTR-MCNC: 144 MG/DL — HIGH (ref 70–99)
GLUCOSE BLDC GLUCOMTR-MCNC: 183 MG/DL — HIGH (ref 70–99)
GLUCOSE BLDC GLUCOMTR-MCNC: 244 MG/DL — HIGH (ref 70–99)
GLUCOSE SERPL-MCNC: 128 MG/DL — HIGH (ref 70–99)
HCT VFR BLD CALC: 30.3 % — LOW (ref 34.5–45)
HGB BLD-MCNC: 8.8 G/DL — LOW (ref 11.5–15.5)
IMM GRANULOCYTES NFR BLD AUTO: 5.2 % — HIGH (ref 0–1.5)
LYMPHOCYTES # BLD AUTO: 2.37 K/UL — SIGNIFICANT CHANGE UP (ref 1–3.3)
LYMPHOCYTES # BLD AUTO: 20.1 % — SIGNIFICANT CHANGE UP (ref 13–44)
MAGNESIUM SERPL-MCNC: 2 MG/DL — SIGNIFICANT CHANGE UP (ref 1.6–2.6)
MCHC RBC-ENTMCNC: 29 GM/DL — LOW (ref 32–36)
MCHC RBC-ENTMCNC: 29 PG — SIGNIFICANT CHANGE UP (ref 27–34)
MCV RBC AUTO: 100 FL — SIGNIFICANT CHANGE UP (ref 80–100)
MONOCYTES # BLD AUTO: 0.94 K/UL — HIGH (ref 0–0.9)
MONOCYTES NFR BLD AUTO: 8 % — SIGNIFICANT CHANGE UP (ref 2–14)
NEUTROPHILS # BLD AUTO: 7.57 K/UL — HIGH (ref 1.8–7.4)
NEUTROPHILS NFR BLD AUTO: 64.1 % — SIGNIFICANT CHANGE UP (ref 43–77)
PLATELET # BLD AUTO: 202 K/UL — SIGNIFICANT CHANGE UP (ref 150–400)
POTASSIUM SERPL-MCNC: 4 MMOL/L — SIGNIFICANT CHANGE UP (ref 3.5–5.3)
POTASSIUM SERPL-SCNC: 4 MMOL/L — SIGNIFICANT CHANGE UP (ref 3.5–5.3)
PROT SERPL-MCNC: 6.3 G/DL — LOW (ref 6.6–8.7)
RBC # BLD: 3.03 M/UL — LOW (ref 3.8–5.2)
RBC # FLD: 15.9 % — HIGH (ref 10.3–14.5)
SODIUM SERPL-SCNC: 144 MMOL/L — SIGNIFICANT CHANGE UP (ref 135–145)
WBC # BLD: 11.81 K/UL — HIGH (ref 3.8–10.5)
WBC # FLD AUTO: 11.81 K/UL — HIGH (ref 3.8–10.5)

## 2021-05-08 PROCEDURE — 99233 SBSQ HOSP IP/OBS HIGH 50: CPT

## 2021-05-08 PROCEDURE — 99232 SBSQ HOSP IP/OBS MODERATE 35: CPT

## 2021-05-08 PROCEDURE — 71045 X-RAY EXAM CHEST 1 VIEW: CPT | Mod: 26

## 2021-05-08 RX ORDER — METOPROLOL TARTRATE 50 MG
2.5 TABLET ORAL EVERY 6 HOURS
Refills: 0 | Status: COMPLETED | OUTPATIENT
Start: 2021-05-08 | End: 2021-05-10

## 2021-05-08 RX ORDER — ASPIRIN/CALCIUM CARB/MAGNESIUM 324 MG
300 TABLET ORAL DAILY
Refills: 0 | Status: DISCONTINUED | OUTPATIENT
Start: 2021-05-08 | End: 2021-05-12

## 2021-05-08 RX ORDER — PANTOPRAZOLE SODIUM 20 MG/1
40 TABLET, DELAYED RELEASE ORAL DAILY
Refills: 0 | Status: DISCONTINUED | OUTPATIENT
Start: 2021-05-08 | End: 2021-05-23

## 2021-05-08 RX ADMIN — PANTOPRAZOLE SODIUM 40 MILLIGRAM(S): 20 TABLET, DELAYED RELEASE ORAL at 17:16

## 2021-05-08 RX ADMIN — CHLORHEXIDINE GLUCONATE 1 APPLICATION(S): 213 SOLUTION TOPICAL at 05:40

## 2021-05-08 RX ADMIN — Medication 81 MILLIGRAM(S): at 12:27

## 2021-05-08 RX ADMIN — POLYETHYLENE GLYCOL 3350 17 GRAM(S): 17 POWDER, FOR SOLUTION ORAL at 12:28

## 2021-05-08 RX ADMIN — HEPARIN SODIUM 5000 UNIT(S): 5000 INJECTION INTRAVENOUS; SUBCUTANEOUS at 05:39

## 2021-05-08 RX ADMIN — MIDODRINE HYDROCHLORIDE 2.5 MILLIGRAM(S): 2.5 TABLET ORAL at 06:12

## 2021-05-08 RX ADMIN — Medication 1 TABLET(S): at 12:28

## 2021-05-08 RX ADMIN — Medication 2.5 MILLIGRAM(S): at 23:00

## 2021-05-08 RX ADMIN — Medication 4: at 12:46

## 2021-05-08 RX ADMIN — Medication 27.5 MILLIGRAM(S): at 05:40

## 2021-05-08 RX ADMIN — Medication 2.5 MILLIGRAM(S): at 17:17

## 2021-05-08 RX ADMIN — Medication 12.5 MILLIGRAM(S): at 06:12

## 2021-05-08 RX ADMIN — HEPARIN SODIUM 5000 UNIT(S): 5000 INJECTION INTRAVENOUS; SUBCUTANEOUS at 22:56

## 2021-05-08 RX ADMIN — HEPARIN SODIUM 5000 UNIT(S): 5000 INJECTION INTRAVENOUS; SUBCUTANEOUS at 14:50

## 2021-05-08 RX ADMIN — Medication 27.5 MILLIGRAM(S): at 17:17

## 2021-05-08 RX ADMIN — Medication 12.5 MILLIGRAM(S): at 12:28

## 2021-05-08 RX ADMIN — Medication 2: at 05:40

## 2021-05-08 RX ADMIN — PANTOPRAZOLE SODIUM 40 MILLIGRAM(S): 20 TABLET, DELAYED RELEASE ORAL at 12:28

## 2021-05-08 RX ADMIN — MIDODRINE HYDROCHLORIDE 2.5 MILLIGRAM(S): 2.5 TABLET ORAL at 12:28

## 2021-05-08 NOTE — PROGRESS NOTE ADULT - SUBJECTIVE AND OBJECTIVE BOX
[facility]                                     Neurology at [place]                                 Steve Gamboa, & Farshad                                  [address]                                        [place], [place], [address]                                             [phone]    CC: stroke  HPI:  The patient is a 71y Female who presented as transfer from [facility] on 4/28/21 with syncope and AMS at dialysis. She had elevated WBC and sepsis workup was done.  She had event of unresponsiveness, left gaze preference and perseveration  Head CT showed evolving left PCA stroke with heme.  She was intubated for airway protection and transferred to [facility].  Yesterday she was downgraded from NSICU and neurology is asked ot evaluate today.    Interval history: no change    Review of systems (neurology): unable to obtain     MEDICATIONS  (STANDING):  aspirin  chewable 81 milliGRAM(s) Enteral Tube daily  atorvastatin 40 milliGRAM(s) Oral at bedtime  chlorhexidine 4% Liquid 1 Application(s) Topical <User Schedule>  dextrose 40% Gel 15 Gram(s) Oral once  dextrose 5%. 1000 milliLiter(s) (50 mL/Hr) IV Continuous <Continuous>  dextrose 5%. 1000 milliLiter(s) (100 mL/Hr) IV Continuous <Continuous>  dextrose 50% Injectable 25 Gram(s) IV Push once  dextrose 50% Injectable 12.5 Gram(s) IV Push once  epoetin analilia-epbx (RETACRIT) Injectable 91370 Unit(s) IV Push <User Schedule>  glucagon  Injectable 1 milliGRAM(s) IntraMuscular once  heparin   Injectable 5000 Unit(s) SubCutaneous every 8 hours  insulin lispro (ADMELOG) corrective regimen sliding scale   SubCutaneous every 6 hours  metoprolol tartrate 12.5 milliGRAM(s) Oral every 6 hours  midodrine. 2.5 milliGRAM(s) Oral three times a day  Nephro-debbie 1 Tablet(s) Oral daily  pantoprazole   Suspension 40 milliGRAM(s) Oral daily  polyethylene glycol 3350 17 Gram(s) Oral daily  senna 2 Tablet(s) Oral at bedtime  valproate sodium IVPB 500 milliGRAM(s) IV Intermittent every 12 hours    MEDICATIONS  (PRN):  acetaminophen    Suspension .. 650 milliGRAM(s) Oral every 6 hours PRN Temp greater or equal to 38C (100.4F), Mild Pain (1 - 3)  LORazepam   Injectable 0.25 milliGRAM(s) IV Push every 6 hours PRN Agitation  sodium chloride 0.9% lock flush 10 milliLiter(s) IV Push every 1 hour PRN Pre/post blood products, medications, blood draw, and to maintain line patency      Vital Signs Last 24 Hrs  T(C): 36.8 (08 May 2021 11:00), Max: 37 (08 May 2021 05:00)  T(F): 98.2 (08 May 2021 11:00), Max: 98.6 (08 May 2021 05:00)  HR: 101 (08 May 2021 12:31) (89 - 121)  BP: 113/64 (08 May 2021 12:31) (100/61 - 146/73)  BP(mean): --  RR: 18 (08 May 2021 11:00) (18 - 20)  SpO2: 99% (08 May 2021 11:00) (96% - 99%)    Detailed Neurologic Exam:    Mental status: The patient is awake and alert and has diminished attention span.  The patient is confused per previous notes    Cranial nerves: Pupils equal and react symmetrically to light. There is no blink to threat on right.  Extraocular motion is full. There is no ptosis. Facial sensation is intact. Facial musculature is symmetric.    Motor: There is normal bulk and tone.  There is no tremor.  moves right less than left to stimuli    Sensation: grimace to light pinch b/l    Reflexes: 1+ throughout and plantar responses are equivocal.    Cerebellar: unable to assess dysmetria on finger to nose testing.    Gait : deferred    LABS:                            8.8    11.81 )-----------( 202      ( 08 May 2021 07:58 )             30.3     05-08    144  |  101  |  23.0<H>  ----------------------------<  128<H>  4.0   |  24.0  |  4.57<H>    Ca    8.3<L>      08 May 2021 07:58  Mg     2.0     05-08    TPro  6.3<L>  /  Alb  2.8<L>  /  TBili  0.3<L>  /  DBili  x   /  AST  20  /  ALT  6   /  AlkPhos  63  05-08    LIVER FUNCTIONS - ( 08 May 2021 07:58 )  Alb: 2.8 g/dL / Pro: 6.3 g/dL / ALK PHOS: 63 U/L / ALT: 6 U/L / AST: 20 U/L / GGT: x             Lipid Profile in AM (04.30.21 @ 04:55)    Cholesterol, Serum: 106 mg/dL    Triglycerides, Serum: 133 mg/dL    HDL Cholesterol, Serum: 40 mg/dL    Non HDL Cholesterol: 66:     LDL Cholesterol Calculated: 39 mg/dL      RADIOLOGY & ADDITIONAL STUDIES (independently reviewed unless otherwise noted):  Head CT 5/3 showed left PCA stroke with hemorrhagic conversion, no mass noted

## 2021-05-08 NOTE — CHART NOTE - NSCHARTNOTEFT_GEN_A_CORE
PA NOTE-MEDICINE    Called to Reinsert Dobhoff Feeding Tube pt pulled out   Inserted NGT via R Nare  Pt tolerated Procedure Well  CXR Urgent ordered for confirmation of NGT placement  Do NOT use until Confirmation of tube placement obtained

## 2021-05-08 NOTE — PROGRESS NOTE ADULT - ASSESSMENT
70 y/o female with PMHx of CVA 3 weeks prior to admission, cardiac arrest,  CAD s/p stents '07, HTN, MI, Parosysmal afib, liver abscess, s/p biliary stent with removal (11/2018; 7/2/19), chronic pancreatitis, DM2 on insulin, diabetic neuropathy, ESRD (5/2018) on HD (M/W/F since 5/18) presents to [facility] as a transfer from [place] after she went to HD with and had  an episode of syncope and change in mental status. The HD RN reported she became unresponsive, developed a L gaze preference, and was perseverating. Patient went for emergent CTH which revealed evolving L posterior cerebral artery infarct with new hemorrhage. Patient was subsequently intubated due to concern for deteriorating and then transferred to [facility].     #Acute metabolic encephalopathy - secondary to multiple cvas in the past month  -asa and statin and depakote  --> neurology following  --> as per neurosurgery repeat ct head on 5.11 to reassess on when to start AC  - not improving  --> maintain ng tube  --.> ammonia tsh WNL    #CVA - in setting of afib  plan as above  - neuro following    #dm2 - elevated a1c  - c.w ssi    #afib - spoke to cardio and ep  --> c.w metoprolol q6h  - -> no dig as per EP   --> decision on AC on 5/11     #esrd - renal following   c.w HD  --> vascular following - eventual fistulogram  --> s/p left ij placed    #diet - ng tube feeds   nutrition consult     prognosis poor   palliative following  - family wants full code

## 2021-05-08 NOTE — PROGRESS NOTE ADULT - SUBJECTIVE AND OBJECTIVE BOX
IRENE TAYLOR    [id number]    71y      Female    INTERVAL HPI/OVERNIGHT EVENTS:    off service note  Patient is a 70 y/o female with PMHx of CVA 3 weeks prior to admission, cardiac arrest,  CAD s/p stents '07, HTN, MI, Parosysmal afib, liver abscess, s/p biliary stent with removal (11/2018; 7/2/19), chronic pancreatitis, DM2 on insulin, diabetic neuropathy, ESRD (5/2018) on HD (M/W/F since 5/18) presents to [facility] as a transfer from [place] after she went to HD with and had  an episode of syncope and change in mental status. The HD RN reported she became unresponsive, developed a L gaze preference, and was perseverating. Patient went for emergent CTH which revealed evolving L posterior cerebral artery infarct with new hemorrhage. Patient was subsequently intubated due to concern for deteriorating and then transferred to [facility].     Of note, patient was recently admitted to [place] on 4/12/21 for hypoxic/hypercapnic resp failure and hyperkalemia secondary to ESRD. While admitted pt was found to be confused, MRI obtained revealed large acute infarct of the left PCA. Pt was originally on Asprin which was changed to Plavix. Patient was discharged to rehab on Plavix.    Patient brought to NSICU and seen by nephro and continued on HD. Patient was extubated on 5/1. Patient seen by vascular for low flow out of the fistula (with eventual plan for fistulogram) and had a left femoral placed for HD.     Patient also had tte    Summary:   1. Technically suboptimal study.   2. Mildly enlarged left atrium.   3. Left ventricular ejection fraction, by visual estimation, is 45 to 50%.    Patient downgraded to medicine on 5/4 and cardio, EP, palliative and neuro consulted. Patient also started on low dose aspirin and hep sub q with the plan being a repeat ct head on 5/11 as per Neurosurgery.     5/6- ng tube placed and tube feeds started.   5/6 - vascular placed a left neck IJ for HD    patient seen at bedside and is slightl more awake. Patient continues to require restraints.       REVIEW OF SYSTEMS:  unable to obtain secondary to mental status    Vital Signs Last 24 Hrs  T(C): 36.8 (08 May 2021 11:00), Max: 37 (08 May 2021 05:00)  T(F): 98.2 (08 May 2021 11:00), Max: 98.6 (08 May 2021 05:00)  HR: 101 (08 May 2021 12:31) (89 - 121)  BP: 113/64 (08 May 2021 12:31) (100/61 - 146/73)  BP(mean): --  RR: 18 (08 May 2021 11:00) (18 - 20)  SpO2: 99% (08 May 2021 11:00) (96% - 99%)    PHYSICAL EXAM:    GENERAL: NAD, confused  HEENT: ng tube   NECK: soft, Supple, No JVD, left IJ   CHEST/LUNG: Clear to auscultation bilaterally; No wheezing  HEART: S1S2+,   ABDOMEN: Soft, Nontender, Nondistended; Bowel sounds present  EXTREMITIES:  no edema   SKIN: No rashes or lesions  NEURO: Awake but not alert.     LABS:                        8.8    11.81 )-----------( 202      ( 08 May 2021 07:58 )             30.3     05-08    144  |  101  |  23.0<H>  ----------------------------<  128<H>  4.0   |  24.0  |  4.57<H>    Ca    8.3<L>      08 May 2021 07:58  Mg     2.0     05-08    TPro  6.3<L>  /  Alb  2.8<L>  /  TBili  0.3<L>  /  DBili  x   /  AST  20  /  ALT  6   /  AlkPhos  63  05-08            MEDICATIONS  (STANDING):  aspirin  chewable 81 milliGRAM(s) Enteral Tube daily  atorvastatin 40 milliGRAM(s) Oral at bedtime  chlorhexidine 4% Liquid 1 Application(s) Topical <User Schedule>  dextrose 40% Gel 15 Gram(s) Oral once  dextrose 5%. 1000 milliLiter(s) (50 mL/Hr) IV Continuous <Continuous>  dextrose 5%. 1000 milliLiter(s) (100 mL/Hr) IV Continuous <Continuous>  dextrose 50% Injectable 25 Gram(s) IV Push once  dextrose 50% Injectable 12.5 Gram(s) IV Push once  epoetin analilia-epbx (RETACRIT) Injectable 22001 Unit(s) IV Push <User Schedule>  glucagon  Injectable 1 milliGRAM(s) IntraMuscular once  heparin   Injectable 5000 Unit(s) SubCutaneous every 8 hours  insulin lispro (ADMELOG) corrective regimen sliding scale   SubCutaneous every 6 hours  metoprolol tartrate 12.5 milliGRAM(s) Oral every 6 hours  midodrine. 2.5 milliGRAM(s) Oral three times a day  Nephro-debbie 1 Tablet(s) Oral daily  pantoprazole   Suspension 40 milliGRAM(s) Oral daily  polyethylene glycol 3350 17 Gram(s) Oral daily  senna 2 Tablet(s) Oral at bedtime  valproate sodium IVPB 500 milliGRAM(s) IV Intermittent every 12 hours    MEDICATIONS  (PRN):  acetaminophen    Suspension .. 650 milliGRAM(s) Oral every 6 hours PRN Temp greater or equal to 38C (100.4F), Mild Pain (1 - 3)  LORazepam   Injectable 0.25 milliGRAM(s) IV Push every 6 hours PRN Agitation  sodium chloride 0.9% lock flush 10 milliLiter(s) IV Push every 1 hour PRN Pre/post blood products, medications, blood draw, and to maintain line patency      RADIOLOGY & ADDITIONAL TESTS:

## 2021-05-08 NOTE — PROGRESS NOTE ADULT - SUBJECTIVE AND OBJECTIVE BOX
[facility] DIVISION OF KIDNEY DISEASES AND HYPERTENSION -- FOLLOW UP NOTE  --------------------------------------------------------------------------------  Chief Complaint:  ESRD HD  24 hour events/subjective:  Seen/examined  HD MWF      PAST HISTORY  --------------------------------------------------------------------------------  No significant changes to PMH, PSH, FHx, SHx, unless otherwise noted    ALLERGIES & MEDICATIONS  --------------------------------------------------------------------------------  Allergies    ertapenem (Urticaria)  Purell (Rash)    Intolerances      Standing Inpatient Medications  aspirin  chewable 81 milliGRAM(s) Enteral Tube daily  atorvastatin 40 milliGRAM(s) Oral at bedtime  chlorhexidine 4% Liquid 1 Application(s) Topical <User Schedule>  dextrose 40% Gel 15 Gram(s) Oral once  dextrose 5%. 1000 milliLiter(s) IV Continuous <Continuous>  dextrose 5%. 1000 milliLiter(s) IV Continuous <Continuous>  dextrose 50% Injectable 25 Gram(s) IV Push once  dextrose 50% Injectable 12.5 Gram(s) IV Push once  epoetin analilia-epbx (RETACRIT) Injectable 73019 Unit(s) IV Push <User Schedule>  glucagon  Injectable 1 milliGRAM(s) IntraMuscular once  heparin   Injectable 5000 Unit(s) SubCutaneous every 8 hours  insulin lispro (ADMELOG) corrective regimen sliding scale   SubCutaneous every 6 hours  metoprolol tartrate 12.5 milliGRAM(s) Oral every 6 hours  midodrine. 2.5 milliGRAM(s) Oral three times a day  Nephro-debbie 1 Tablet(s) Oral daily  pantoprazole   Suspension 40 milliGRAM(s) Oral daily  polyethylene glycol 3350 17 Gram(s) Oral daily  senna 2 Tablet(s) Oral at bedtime  valproate sodium IVPB 500 milliGRAM(s) IV Intermittent every 12 hours    PRN Inpatient Medications  acetaminophen    Suspension .. 650 milliGRAM(s) Oral every 6 hours PRN  LORazepam   Injectable 0.25 milliGRAM(s) IV Push every 6 hours PRN  sodium chloride 0.9% lock flush 10 milliLiter(s) IV Push every 1 hour PRN      REVIEW OF SYSTEMS  --------------------------------------------------------------------------------  Unable to obtain    VITALS/PHYSICAL EXAM  --------------------------------------------------------------------------------  T(C): 36.8 (05-08-21 @ 11:00), Max: 37 (05-08-21 @ 05:00)  HR: 101 (05-08-21 @ 12:31) (89 - 121)  BP: 113/64 (05-08-21 @ 12:31) (100/61 - 146/73)  RR: 18 (05-08-21 @ 11:00) (18 - 20)  SpO2: 99% (05-08-21 @ 11:00) (96% - 99%)  Wt(kg): --        05-07-21 @ 07:01  -  05-08-21 @ 07:00  --------------------------------------------------------  IN: 60 mL / OUT: 1000 mL / NET: -940 mL    05-08-21 @ 07:01  -  05-08-21 @ 13:32  --------------------------------------------------------  IN: 40 mL / OUT: 0 mL / NET: 40 mL      Physical Exam:  	Gen: NAD   	HEENT: PERRL, supple neck, clear oropharynx  	Pulm: CTA B/L  	CV: RRR, S1S2; no rub  	Back: No spinal or CVA tenderness; no sacral edema  	Abd: +BS, soft, nontender/nondistended  	: No suprapubic tenderness  	UE: Warm, FROM, no clubbing, intact strength; no edema; no asterixis  	LE: Warm, FROM, no clubbing, intact strength; no edema  	Neuro: No focal deficits, intact gait  	Psych: Normal affect and mood  	Skin: Warm, without rashes    LABS/STUDIES  --------------------------------------------------------------------------------              8.8    11.81 >-----------<  202      [05-08-21 @ 07:58]              30.3     144  |  101  |  23.0  ----------------------------<  128      [05-08-21 @ 07:58]  4.0   |  24.0  |  4.57        Ca     8.3     [05-08-21 @ 07:58]      Mg     2.0     [05-08-21 @ 07:58]    TPro  6.3  /  Alb  2.8  /  TBili  0.3  /  DBili  x   /  AST  20  /  ALT  6   /  AlkPhos  63  [05-08-21 @ 07:58]          Creatinine Trend:  SCr 4.57 [05-08 @ 07:58]  SCr 8.37 [05-07 @ 08:03]  SCr 7.81 [05-06 @ 10:12]  SCr 7.41 [05-06 @ 04:18]  SCr 7.54 [05-05 @ 10:33]        HbA1c 9.4      [02-07-20 @ 21:14]  TSH 1.15      [04-14-21 @ 08:13]  Lipid: chol 106, , HDL 40, LDL --      [04-30-21 @ 04:55]    HBsAb <3.0      [04-13-21 @ 02:54]  HBsAg Nonreact      [04-13-21 @ 02:54]  HCV 0.22, Nonreact      [04-13-21 @ 02:54]

## 2021-05-08 NOTE — PROGRESS NOTE ADULT - ASSESSMENT
The patient is a 71y Female who is followed by neurology because of left PCA CVA with heme    Left PCA stroke with hemorrhagic conversion  This event occured over a week ago  LDL=39  continue ASA and lipitor  maintain normotension  history of PAF, suggest follow up head CT 5/11/2021 and if stable can start anticoagulation.  evaluated for Watchman, may be appropriate candidate  PT/OT      will follow with you    Kenny Wilson MD PhD   [id number]

## 2021-05-09 LAB
GLUCOSE BLDC GLUCOMTR-MCNC: 120 MG/DL — HIGH (ref 70–99)
GLUCOSE BLDC GLUCOMTR-MCNC: 121 MG/DL — HIGH (ref 70–99)
GLUCOSE BLDC GLUCOMTR-MCNC: 127 MG/DL — HIGH (ref 70–99)
GLUCOSE BLDC GLUCOMTR-MCNC: 137 MG/DL — HIGH (ref 70–99)

## 2021-05-09 PROCEDURE — 99233 SBSQ HOSP IP/OBS HIGH 50: CPT

## 2021-05-09 PROCEDURE — 71045 X-RAY EXAM CHEST 1 VIEW: CPT | Mod: 26,76

## 2021-05-09 RX ADMIN — HEPARIN SODIUM 5000 UNIT(S): 5000 INJECTION INTRAVENOUS; SUBCUTANEOUS at 13:40

## 2021-05-09 RX ADMIN — SENNA PLUS 2 TABLET(S): 8.6 TABLET ORAL at 23:00

## 2021-05-09 RX ADMIN — Medication 2.5 MILLIGRAM(S): at 05:26

## 2021-05-09 RX ADMIN — Medication 2.5 MILLIGRAM(S): at 17:49

## 2021-05-09 RX ADMIN — PANTOPRAZOLE SODIUM 40 MILLIGRAM(S): 20 TABLET, DELAYED RELEASE ORAL at 12:30

## 2021-05-09 RX ADMIN — Medication 300 MILLIGRAM(S): at 12:28

## 2021-05-09 RX ADMIN — Medication 2.5 MILLIGRAM(S): at 12:28

## 2021-05-09 RX ADMIN — ATORVASTATIN CALCIUM 40 MILLIGRAM(S): 80 TABLET, FILM COATED ORAL at 23:00

## 2021-05-09 RX ADMIN — Medication 27.5 MILLIGRAM(S): at 05:27

## 2021-05-09 RX ADMIN — CHLORHEXIDINE GLUCONATE 1 APPLICATION(S): 213 SOLUTION TOPICAL at 05:26

## 2021-05-09 RX ADMIN — Medication 2.5 MILLIGRAM(S): at 23:00

## 2021-05-09 RX ADMIN — Medication 1 TABLET(S): at 13:39

## 2021-05-09 RX ADMIN — HEPARIN SODIUM 5000 UNIT(S): 5000 INJECTION INTRAVENOUS; SUBCUTANEOUS at 05:26

## 2021-05-09 RX ADMIN — Medication 27.5 MILLIGRAM(S): at 17:42

## 2021-05-09 RX ADMIN — HEPARIN SODIUM 5000 UNIT(S): 5000 INJECTION INTRAVENOUS; SUBCUTANEOUS at 23:00

## 2021-05-09 RX ADMIN — MIDODRINE HYDROCHLORIDE 2.5 MILLIGRAM(S): 2.5 TABLET ORAL at 13:40

## 2021-05-09 NOTE — PROGRESS NOTE ADULT - SUBJECTIVE AND OBJECTIVE BOX
IRENE TAYLOR    [id number]    71y      Female    INTERVAL HPI/OVERNIGHT EVENTS:    off service note  Patient is a 70 y/o female with PMHx of CVA 3 weeks prior to admission, cardiac arrest,  CAD s/p stents '07, HTN, MI, Parosysmal afib, liver abscess, s/p biliary stent with removal (11/2018; 7/2/19), chronic pancreatitis, DM2 on insulin, diabetic neuropathy, ESRD (5/2018) on HD (M/W/F since 5/18) presents to [facility] as a transfer from [place] after she went to HD with and had  an episode of syncope and change in mental status. The HD RN reported she became unresponsive, developed a L gaze preference, and was perseverating. Patient went for emergent CTH which revealed evolving L posterior cerebral artery infarct with new hemorrhage. Patient was subsequently intubated due to concern for deteriorating and then transferred to [facility].     Of note, patient was recently admitted to [place] on 4/12/21 for hypoxic/hypercapnic resp failure and hyperkalemia secondary to ESRD. While admitted pt was found to be confused, MRI obtained revealed large acute infarct of the left PCA. Pt was originally on Asprin which was changed to Plavix. Patient was discharged to rehab on Plavix.    Patient brought to NSICU and seen by nephro and continued on HD. Patient was extubated on 5/1. Patient seen by vascular for low flow out of the fistula (with eventual plan for fistulogram) and had a left femoral placed for HD.     Patient also had tte    Summary:   1. Technically suboptimal study.   2. Mildly enlarged left atrium.   3. Left ventricular ejection fraction, by visual estimation, is 45 to 50%.    Patient downgraded to medicine on 5/4 and cardio, EP, palliative and neuro consulted. Patient also started on low dose aspirin and hep sub q with the plan being a repeat ct head on 5/11 as per Neurosurgery.     5/6- ng tube placed and tube feeds started.   5/6 - vascular placed a left neck IJ for HD    5/8 ng tube clogged and removed,  5/9 failed another speech eval and new ng tube placed    REVIEW OF SYSTEMS:    unable to obtain secondary to mental status       Vital Signs Last 24 Hrs  T(C): 36.4 (09 May 2021 04:53), Max: 36.9 (08 May 2021 17:09)  T(F): 97.6 (09 May 2021 04:53), Max: 98.4 (08 May 2021 17:09)  HR: 76 (09 May 2021 12:30) (76 - 80)  BP: 123/71 (09 May 2021 12:30) (115/75 - 136/73)  BP(mean): --  RR: 19 (09 May 2021 04:53) (17 - 19)  SpO2: 95% (09 May 2021 04:53) (95% - 98%)    PHYSICAL EXAM:    GENERAL: lethargic, convufsed  HEENT: ng tube   NECK: soft, Supple, No JVD, left IJ   CHEST/LUNG: Clear to auscultation bilaterally; No wheezing  HEART: S1S2+,   ABDOMEN: Soft, Nontender, Nondistended; Bowel sounds present  EXTREMITIES:  no edema   SKIN: No rashes or lesions  NEURO: Awake but not alert.     LABS:                        8.8    11.81 )-----------( 202      ( 08 May 2021 07:58 )             30.3     05-08    144  |  101  |  23.0<H>  ----------------------------<  128<H>  4.0   |  24.0  |  4.57<H>    Ca    8.3<L>      08 May 2021 07:58  Mg     2.0     05-08    TPro  6.3<L>  /  Alb  2.8<L>  /  TBili  0.3<L>  /  DBili  x   /  AST  20  /  ALT  6   /  AlkPhos  63  05-08            MEDICATIONS  (STANDING):  aspirin Suppository 300 milliGRAM(s) Rectal daily  atorvastatin 40 milliGRAM(s) Oral at bedtime  chlorhexidine 4% Liquid 1 Application(s) Topical <User Schedule>  dextrose 40% Gel 15 Gram(s) Oral once  dextrose 5%. 1000 milliLiter(s) (50 mL/Hr) IV Continuous <Continuous>  dextrose 5%. 1000 milliLiter(s) (100 mL/Hr) IV Continuous <Continuous>  dextrose 50% Injectable 25 Gram(s) IV Push once  dextrose 50% Injectable 12.5 Gram(s) IV Push once  epoetin analilia-epbx (RETACRIT) Injectable 15948 Unit(s) IV Push <User Schedule>  glucagon  Injectable 1 milliGRAM(s) IntraMuscular once  heparin   Injectable 5000 Unit(s) SubCutaneous every 8 hours  insulin lispro (ADMELOG) corrective regimen sliding scale   SubCutaneous every 6 hours  metoprolol tartrate Injectable 2.5 milliGRAM(s) IV Push every 6 hours  midodrine. 2.5 milliGRAM(s) Oral three times a day  Nephro-debbie 1 Tablet(s) Oral daily  pantoprazole  Injectable 40 milliGRAM(s) IV Push daily  polyethylene glycol 3350 17 Gram(s) Oral daily  senna 2 Tablet(s) Oral at bedtime  valproate sodium IVPB 500 milliGRAM(s) IV Intermittent every 12 hours    MEDICATIONS  (PRN):  acetaminophen    Suspension .. 650 milliGRAM(s) Oral every 6 hours PRN Temp greater or equal to 38C (100.4F), Mild Pain (1 - 3)  LORazepam   Injectable 0.25 milliGRAM(s) IV Push every 6 hours PRN Agitation  sodium chloride 0.9% lock flush 10 milliLiter(s) IV Push every 1 hour PRN Pre/post blood products, medications, blood draw, and to maintain line patency      RADIOLOGY & ADDITIONAL TESTS:

## 2021-05-09 NOTE — CHART NOTE - NSCHARTNOTEFT_GEN_A_CORE
NGT tube placed earlier by Dr Bob. Follow-up CXR report indicates tip in stomach. Discussed with Dr Bob will resume feeds.

## 2021-05-09 NOTE — SWALLOW BEDSIDE ASSESSMENT ADULT - ORAL PREPARATORY PHASE
Pt initially sealing lips, however accepted one small trial of honey thickened fluids. Reduced labial seal on cup rim. Initially sealing lips upon presentation of spoon. With encouragement, pt accepted bolus, however notable for reduced oral grading and stripping of spoon.

## 2021-05-09 NOTE — CHART NOTE - NSCHARTNOTEFT_GEN_A_CORE
RN for clarification of NGT usage, re CXR result. On review of CXR for NGT placement, NGT correctly placed, per radiology report.  Provider to RN order, authorizing use of NGT placed. Called by RN for clarification of NGT usage, re CXR result. On review of CXR for NGT placement, NGT correctly placed, per radiology report.  Provider to RN order, authorizing use of NGT placed.

## 2021-05-09 NOTE — SWALLOW BEDSIDE ASSESSMENT ADULT - PHARYNGEAL PHASE
Grossly functional, however limited trials accepted and effect of reduced cognition/oral stage deficits pose aspiration risk

## 2021-05-09 NOTE — SWALLOW BEDSIDE ASSESSMENT ADULT - SLP GENERAL OBSERVATIONS
Pt received asleep in bed, arousable to voice and alert during evaluation, reduced cognition, and pulling at sheets at times, +annamaria speaking, language line #[id number] used for interpretation, declining most offered PO trials, swatting clinician's hand away and sealing lips upon presentation of spoon/cup rim, minimal verbalizations, +left hand mitt, b/l wrist restraints, nonverbal pain 0/10 pre/post evaluation
Recd awake & upright OOB to chair, A&A Ox1 w/cues, reduced cognition, +NGT, limited verbalizations, +Roberto speaking, ID # [id number], 0/10 pain pre and post, tolerating RA no overt distress.

## 2021-05-09 NOTE — PROGRESS NOTE ADULT - SUBJECTIVE AND OBJECTIVE BOX
[facility] DIVISION OF KIDNEY DISEASES AND HYPERTENSION -- FOLLOW UP NOTE  --------------------------------------------------------------------------------  Chief Complaint:  ESRD HD  24 hour events/subjective:  Seen/examined  HD MWF      PAST HISTORY  --------------------------------------------------------------------------------  No significant changes to PMH, PSH, FHx, SHx, unless otherwise noted    ALLERGIES & MEDICATIONS  --------------------------------------------------------------------------------  Allergies    ertapenem (Urticaria)  Purell (Rash)    Intolerances      Standing Inpatient Medications  aspirin Suppository 300 milliGRAM(s) Rectal daily  atorvastatin 40 milliGRAM(s) Oral at bedtime  chlorhexidine 4% Liquid 1 Application(s) Topical <User Schedule>  dextrose 40% Gel 15 Gram(s) Oral once  dextrose 5%. 1000 milliLiter(s) IV Continuous <Continuous>  dextrose 5%. 1000 milliLiter(s) IV Continuous <Continuous>  dextrose 50% Injectable 25 Gram(s) IV Push once  dextrose 50% Injectable 12.5 Gram(s) IV Push once  epoetin analilia-epbx (RETACRIT) Injectable 63247 Unit(s) IV Push <User Schedule>  glucagon  Injectable 1 milliGRAM(s) IntraMuscular once  heparin   Injectable 5000 Unit(s) SubCutaneous every 8 hours  insulin lispro (ADMELOG) corrective regimen sliding scale   SubCutaneous every 6 hours  metoprolol tartrate Injectable 2.5 milliGRAM(s) IV Push every 6 hours  midodrine. 2.5 milliGRAM(s) Oral three times a day  Nephro-debbie 1 Tablet(s) Oral daily  pantoprazole  Injectable 40 milliGRAM(s) IV Push daily  polyethylene glycol 3350 17 Gram(s) Oral daily  senna 2 Tablet(s) Oral at bedtime  valproate sodium IVPB 500 milliGRAM(s) IV Intermittent every 12 hours    PRN Inpatient Medications  acetaminophen    Suspension .. 650 milliGRAM(s) Oral every 6 hours PRN  LORazepam   Injectable 0.25 milliGRAM(s) IV Push every 6 hours PRN  sodium chloride 0.9% lock flush 10 milliLiter(s) IV Push every 1 hour PRN      REVIEW OF SYSTEMS  --------------------------------------------------------------------------------  Unable to obtain    VITALS/PHYSICAL EXAM  --------------------------------------------------------------------------------  T(C): 36.4 (05-09-21 @ 04:53), Max: 36.9 (05-08-21 @ 17:09)  HR: 80 (05-09-21 @ 04:53) (78 - 101)  BP: 115/75 (05-09-21 @ 04:53) (113/64 - 136/73)  RR: 19 (05-09-21 @ 04:53) (17 - 19)  SpO2: 95% (05-09-21 @ 04:53) (95% - 98%)  Wt(kg): --        05-08-21 @ 07:01  -  05-09-21 @ 07:00  --------------------------------------------------------  IN: 40 mL / OUT: 0 mL / NET: 40 mL      Physical Exam:              Gen: NAD   	HEENT: PERRL, supple neck, clear oropharynx  	Pulm: CTA B/L  	CV: RRR, S1S2; no rub  	Back: No spinal or CVA tenderness; no sacral edema  	Abd: +BS, soft, nontender/nondistended  	: No suprapubic tenderness  	UE: Warm, FROM, no clubbing, intact strength; no edema; no asterixis  	LE: Warm, FROM, no clubbing, intact strength; no edema  	Neuro: No focal deficits, intact gait  	Psych: Normal affect and mood  	Skin: Warm, without rashes    LABS/STUDIES  --------------------------------------------------------------------------------              8.8    11.81 >-----------<  202      [05-08-21 @ 07:58]              30.3     144  |  101  |  23.0  ----------------------------<  128      [05-08-21 @ 07:58]  4.0   |  24.0  |  4.57        Ca     8.3     [05-08-21 @ 07:58]      Mg     2.0     [05-08-21 @ 07:58]    TPro  6.3  /  Alb  2.8  /  TBili  0.3  /  DBili  x   /  AST  20  /  ALT  6   /  AlkPhos  63  [05-08-21 @ 07:58]          Creatinine Trend:  SCr 4.57 [05-08 @ 07:58]  SCr 8.37 [05-07 @ 08:03]  SCr 7.81 [05-06 @ 10:12]  SCr 7.41 [05-06 @ 04:18]  SCr 7.54 [05-05 @ 10:33]        HbA1c 9.4      [02-07-20 @ 21:14]  TSH 1.15      [04-14-21 @ 08:13]  Lipid: chol 106, , HDL 40, LDL --      [04-30-21 @ 04:55]    HBsAb <3.0      [04-13-21 @ 02:54]  HBsAg Nonreact      [04-13-21 @ 02:54]  HCV 0.22, Nonreact      [04-13-21 @ 02:54]

## 2021-05-09 NOTE — SWALLOW BEDSIDE ASSESSMENT ADULT - COMMENTS
72 y/o female with PMHx of CVA 3 weeks prior to admission, cardiac arrest,  CAD s/p stents '07, HTN, MI, Parosysmal afib, liver abscess, s/p biliary stent with removal (11/2018; 7/2/19), chronic pancreatitis, DM2 on insulin, diabetic neuropathy, ESRD (5/2018) on HD (M/W/F since 5/18) presents to [facility] as a transfer from [place] after she went to HD with and had  an episode of syncope and change in mental status. The HD RN reported she became unresponsive, developed a L gaze preference, and was perseverating. Patient went for emergent CTH which revealed evolving L posterior cerebral artery infarct with new hemorrhage. Patient was subsequently intubated due to concern for deteriorating and then transferred to [facility].   Pt now with Acute metabolic encephalopathy - secondary to multiple cvas in the past month Pt refused additional trials at this time, swatting clinician away with her hand.

## 2021-05-09 NOTE — SWALLOW BEDSIDE ASSESSMENT ADULT - SLP PERTINENT HISTORY OF CURRENT PROBLEM
Pt known to this service, last seen 5/6 with a rx for puree/no liquids. Pt subsequently noted to be vomitting after PO administration and was lethargic, prompting MD order for NPO and NGT placement. RN reports pt's NGT was clogged yesterday and added that she is now with slightly improve mental status. Bedside swallow eval ordered to r/o dysphagia.
As per MD note, "71yF with new hemorrhagic conversion in L PCA territory. Recent admission for pneumonia with hospital course complicated with L PCA CVA discharged on Plavix (not ACT as patient was high risk for bleed).with hemorrhagic conversion in L PCA".

## 2021-05-09 NOTE — PROGRESS NOTE ADULT - ASSESSMENT
#Acute metabolic encephalopathy - secondary to multiple cvas in the past month  -asa and statin and depakote  --> neurology following  --> as per neurosurgery repeat ct head on 5.11 to reassess on when to start AC  - not improving  --> ng tube placed again  --.> ammonia tsh WNL    #CVA - in setting of afib  plan as above  - neuro following    #dm2 - elevated a1c  - c.w ssi    #afib - spoke to cardio and ep  --> c.w metoprolol q6h  - -> no dig as per EP   --> decision on AC on 5/11     #esrd - renal following   c.w HD  --> vascular following - eventual fistulogram  --> s/p left ij placed    #diet - ng tube feeds   nutrition consult     prognosis poor to grim   palliative following  - family wants full code

## 2021-05-09 NOTE — SWALLOW BEDSIDE ASSESSMENT ADULT - SWALLOW EVAL: DIAGNOSIS
Pt presents w/mild oropharyngeal dysphagia. Overall swallow profile negatively impacted upon by reduced cognition. Oral stage noted for reduced mandibular depression, resulting in reduced oral grading of puree via utensil, without benefit from ongoing cues to improve. +Wincing noted following initiation of A-P transit, though denying pain, stating, 'it's cold & I don't like it'. Successful A-P initiation, bolus formation & transit following acceptance via spoon. Pharyngeal dysphagia suspected w/thin & nectar, +delayed cough & wet vocal quality observed. Pt declining trials of honey, despite max attempts/efforts, reporting, "that's enough for today'.
Limited assessment 2* limited PO trials accepted despite maximal encouragement. At least moderate oral dysphagia confounded by reduced cognition and likely behavioral overlay with reduced oral acceptance of PO, reduced oral grading and reduced labial seal on cup rim/spoon. Pharyngeal stage appears grossly functional, however limited trials accepted and effect of reduced cognition/oral stage deficits pose aspiration risk

## 2021-05-10 LAB
ALBUMIN SERPL ELPH-MCNC: 2.8 G/DL — LOW (ref 3.3–5.2)
ALBUMIN SERPL ELPH-MCNC: 2.8 G/DL — LOW (ref 3.3–5.2)
ALP SERPL-CCNC: 69 U/L — SIGNIFICANT CHANGE UP (ref 40–120)
ALT FLD-CCNC: 8 U/L — SIGNIFICANT CHANGE UP
ANION GAP SERPL CALC-SCNC: 12 MMOL/L — SIGNIFICANT CHANGE UP (ref 5–17)
ANION GAP SERPL CALC-SCNC: 16 MMOL/L — SIGNIFICANT CHANGE UP (ref 5–17)
AST SERPL-CCNC: 20 U/L — SIGNIFICANT CHANGE UP
BASOPHILS # BLD AUTO: 0.05 K/UL — SIGNIFICANT CHANGE UP (ref 0–0.2)
BASOPHILS NFR BLD AUTO: 0.6 % — SIGNIFICANT CHANGE UP (ref 0–2)
BILIRUB SERPL-MCNC: 0.4 MG/DL — SIGNIFICANT CHANGE UP (ref 0.4–2)
BUN SERPL-MCNC: 44 MG/DL — HIGH (ref 8–20)
BUN SERPL-MCNC: 45 MG/DL — HIGH (ref 8–20)
CALCIUM SERPL-MCNC: 8.7 MG/DL — SIGNIFICANT CHANGE UP (ref 8.6–10.2)
CALCIUM SERPL-MCNC: 8.8 MG/DL — SIGNIFICANT CHANGE UP (ref 8.6–10.2)
CHLORIDE SERPL-SCNC: 101 MMOL/L — SIGNIFICANT CHANGE UP (ref 98–107)
CHLORIDE SERPL-SCNC: 102 MMOL/L — SIGNIFICANT CHANGE UP (ref 98–107)
CO2 SERPL-SCNC: 23 MMOL/L — SIGNIFICANT CHANGE UP (ref 22–29)
CO2 SERPL-SCNC: 25 MMOL/L — SIGNIFICANT CHANGE UP (ref 22–29)
CREAT SERPL-MCNC: 8.17 MG/DL — HIGH (ref 0.5–1.3)
CREAT SERPL-MCNC: 8.2 MG/DL — HIGH (ref 0.5–1.3)
EOSINOPHIL # BLD AUTO: 0.31 K/UL — SIGNIFICANT CHANGE UP (ref 0–0.5)
EOSINOPHIL NFR BLD AUTO: 3.5 % — SIGNIFICANT CHANGE UP (ref 0–6)
GLUCOSE BLDC GLUCOMTR-MCNC: 131 MG/DL — HIGH (ref 70–99)
GLUCOSE BLDC GLUCOMTR-MCNC: 161 MG/DL — HIGH (ref 70–99)
GLUCOSE BLDC GLUCOMTR-MCNC: 163 MG/DL — HIGH (ref 70–99)
GLUCOSE BLDC GLUCOMTR-MCNC: 186 MG/DL — HIGH (ref 70–99)
GLUCOSE SERPL-MCNC: 126 MG/DL — HIGH (ref 70–99)
GLUCOSE SERPL-MCNC: 138 MG/DL — HIGH (ref 70–99)
HCT VFR BLD CALC: 30.6 % — LOW (ref 34.5–45)
HCT VFR BLD CALC: 30.8 % — LOW (ref 34.5–45)
HGB BLD-MCNC: 8.9 G/DL — LOW (ref 11.5–15.5)
HGB BLD-MCNC: 9 G/DL — LOW (ref 11.5–15.5)
IMM GRANULOCYTES NFR BLD AUTO: 5.3 % — HIGH (ref 0–1.5)
LYMPHOCYTES # BLD AUTO: 2.09 K/UL — SIGNIFICANT CHANGE UP (ref 1–3.3)
LYMPHOCYTES # BLD AUTO: 23.6 % — SIGNIFICANT CHANGE UP (ref 13–44)
MAGNESIUM SERPL-MCNC: 2.3 MG/DL — SIGNIFICANT CHANGE UP (ref 1.6–2.6)
MCHC RBC-ENTMCNC: 28.9 GM/DL — LOW (ref 32–36)
MCHC RBC-ENTMCNC: 29.1 PG — SIGNIFICANT CHANGE UP (ref 27–34)
MCHC RBC-ENTMCNC: 29.4 GM/DL — LOW (ref 32–36)
MCHC RBC-ENTMCNC: 29.6 PG — SIGNIFICANT CHANGE UP (ref 27–34)
MCV RBC AUTO: 100.7 FL — HIGH (ref 80–100)
MCV RBC AUTO: 100.7 FL — HIGH (ref 80–100)
MONOCYTES # BLD AUTO: 0.81 K/UL — SIGNIFICANT CHANGE UP (ref 0–0.9)
MONOCYTES NFR BLD AUTO: 9.2 % — SIGNIFICANT CHANGE UP (ref 2–14)
NEUTROPHILS # BLD AUTO: 5.12 K/UL — SIGNIFICANT CHANGE UP (ref 1.8–7.4)
NEUTROPHILS NFR BLD AUTO: 57.8 % — SIGNIFICANT CHANGE UP (ref 43–77)
PHOSPHATE SERPL-MCNC: 4.6 MG/DL — SIGNIFICANT CHANGE UP (ref 2.4–4.7)
PLATELET # BLD AUTO: 192 K/UL — SIGNIFICANT CHANGE UP (ref 150–400)
PLATELET # BLD AUTO: 230 K/UL — SIGNIFICANT CHANGE UP (ref 150–400)
POTASSIUM SERPL-MCNC: 4 MMOL/L — SIGNIFICANT CHANGE UP (ref 3.5–5.3)
POTASSIUM SERPL-MCNC: 4.2 MMOL/L — SIGNIFICANT CHANGE UP (ref 3.5–5.3)
POTASSIUM SERPL-SCNC: 4 MMOL/L — SIGNIFICANT CHANGE UP (ref 3.5–5.3)
POTASSIUM SERPL-SCNC: 4.2 MMOL/L — SIGNIFICANT CHANGE UP (ref 3.5–5.3)
PROT SERPL-MCNC: 6.4 G/DL — LOW (ref 6.6–8.7)
RBC # BLD: 3.04 M/UL — LOW (ref 3.8–5.2)
RBC # BLD: 3.06 M/UL — LOW (ref 3.8–5.2)
RBC # FLD: 17 % — HIGH (ref 10.3–14.5)
RBC # FLD: 17.1 % — HIGH (ref 10.3–14.5)
SODIUM SERPL-SCNC: 139 MMOL/L — SIGNIFICANT CHANGE UP (ref 135–145)
SODIUM SERPL-SCNC: 140 MMOL/L — SIGNIFICANT CHANGE UP (ref 135–145)
VALPROATE SERPL-MCNC: 33.6 UG/ML — LOW (ref 50–100)
WBC # BLD: 8.85 K/UL — SIGNIFICANT CHANGE UP (ref 3.8–10.5)
WBC # BLD: 9.61 K/UL — SIGNIFICANT CHANGE UP (ref 3.8–10.5)
WBC # FLD AUTO: 8.85 K/UL — SIGNIFICANT CHANGE UP (ref 3.8–10.5)
WBC # FLD AUTO: 9.61 K/UL — SIGNIFICANT CHANGE UP (ref 3.8–10.5)

## 2021-05-10 PROCEDURE — 76937 US GUIDE VASCULAR ACCESS: CPT | Mod: 26

## 2021-05-10 PROCEDURE — 99232 SBSQ HOSP IP/OBS MODERATE 35: CPT

## 2021-05-10 PROCEDURE — 71045 X-RAY EXAM CHEST 1 VIEW: CPT | Mod: 26

## 2021-05-10 PROCEDURE — 36556 INSERT NON-TUNNEL CV CATH: CPT

## 2021-05-10 PROCEDURE — 99233 SBSQ HOSP IP/OBS HIGH 50: CPT

## 2021-05-10 PROCEDURE — 90937 HEMODIALYSIS REPEATED EVAL: CPT

## 2021-05-10 RX ORDER — ALTEPLASE 100 MG
2 KIT INTRAVENOUS ONCE
Refills: 0 | Status: COMPLETED | OUTPATIENT
Start: 2021-05-10 | End: 2021-05-10

## 2021-05-10 RX ORDER — METOPROLOL TARTRATE 50 MG
12.5 TABLET ORAL EVERY 12 HOURS
Refills: 0 | Status: DISCONTINUED | OUTPATIENT
Start: 2021-05-10 | End: 2021-05-14

## 2021-05-10 RX ADMIN — HEPARIN SODIUM 5000 UNIT(S): 5000 INJECTION INTRAVENOUS; SUBCUTANEOUS at 05:34

## 2021-05-10 RX ADMIN — Medication 2: at 11:44

## 2021-05-10 RX ADMIN — Medication 2.5 MILLIGRAM(S): at 05:34

## 2021-05-10 RX ADMIN — Medication 1 TABLET(S): at 11:43

## 2021-05-10 RX ADMIN — ALTEPLASE 2 MILLIGRAM(S): KIT at 09:23

## 2021-05-10 RX ADMIN — SENNA PLUS 2 TABLET(S): 8.6 TABLET ORAL at 21:27

## 2021-05-10 RX ADMIN — POLYETHYLENE GLYCOL 3350 17 GRAM(S): 17 POWDER, FOR SOLUTION ORAL at 11:44

## 2021-05-10 RX ADMIN — Medication 27.5 MILLIGRAM(S): at 05:34

## 2021-05-10 RX ADMIN — ATORVASTATIN CALCIUM 40 MILLIGRAM(S): 80 TABLET, FILM COATED ORAL at 21:27

## 2021-05-10 RX ADMIN — HEPARIN SODIUM 5000 UNIT(S): 5000 INJECTION INTRAVENOUS; SUBCUTANEOUS at 21:28

## 2021-05-10 RX ADMIN — Medication 300 MILLIGRAM(S): at 13:07

## 2021-05-10 RX ADMIN — HEPARIN SODIUM 5000 UNIT(S): 5000 INJECTION INTRAVENOUS; SUBCUTANEOUS at 16:18

## 2021-05-10 RX ADMIN — Medication 2: at 05:34

## 2021-05-10 RX ADMIN — Medication 27.5 MILLIGRAM(S): at 21:27

## 2021-05-10 RX ADMIN — ERYTHROPOIETIN 10000 UNIT(S): 10000 INJECTION, SOLUTION INTRAVENOUS; SUBCUTANEOUS at 17:21

## 2021-05-10 RX ADMIN — PANTOPRAZOLE SODIUM 40 MILLIGRAM(S): 20 TABLET, DELAYED RELEASE ORAL at 11:43

## 2021-05-10 RX ADMIN — MIDODRINE HYDROCHLORIDE 2.5 MILLIGRAM(S): 2.5 TABLET ORAL at 11:43

## 2021-05-10 RX ADMIN — CHLORHEXIDINE GLUCONATE 1 APPLICATION(S): 213 SOLUTION TOPICAL at 05:35

## 2021-05-10 RX ADMIN — Medication 2.5 MILLIGRAM(S): at 13:07

## 2021-05-10 NOTE — PROGRESS NOTE ADULT - SUBJECTIVE AND OBJECTIVE BOX
Chief Complaint:  ESRD HD  24 hour events/subjective:  Seen/examined  HD MWF,    PAST HISTORY  --------------------------------------------------------------------------------  No significant changes to PMH, PSH, FHx, SHx, unless otherwise noted    ALLERGIES & MEDICATIONS  --------------------------------------------------------------------------------  Allergies    ertapenem (Urticaria)  Purell (Rash)    Standing Inpatient Medications  aspirin Suppository 300 milliGRAM(s) Rectal daily  atorvastatin 40 milliGRAM(s) Oral at bedtime  chlorhexidine 4% Liquid 1 Application(s) Topical <User Schedule>  dextrose 40% Gel 15 Gram(s) Oral once  dextrose 5%. 1000 milliLiter(s) IV Continuous <Continuous>  dextrose 5%. 1000 milliLiter(s) IV Continuous <Continuous>  dextrose 50% Injectable 25 Gram(s) IV Push once  dextrose 50% Injectable 12.5 Gram(s) IV Push once  epoetin analilia-epbx (RETACRIT) Injectable 82053 Unit(s) IV Push <User Schedule>  glucagon  Injectable 1 milliGRAM(s) IntraMuscular once  heparin   Injectable 5000 Unit(s) SubCutaneous every 8 hours  insulin lispro (ADMELOG) corrective regimen sliding scale   SubCutaneous every 6 hours  metoprolol tartrate Injectable 2.5 milliGRAM(s) IV Push every 6 hours  midodrine. 2.5 milliGRAM(s) Oral three times a day  Nephro-debbie 1 Tablet(s) Oral daily  pantoprazole  Injectable 40 milliGRAM(s) IV Push daily  polyethylene glycol 3350 17 Gram(s) Oral daily  senna 2 Tablet(s) Oral at bedtime  valproate sodium IVPB 500 milliGRAM(s) IV Intermittent every 12 hours    PRN Inpatient Medications  acetaminophen    Suspension .. 650 milliGRAM(s) Oral every 6 hours PRN  LORazepam   Injectable 0.25 milliGRAM(s) IV Push every 6 hours PRN  sodium chloride 0.9% lock flush 10 milliLiter(s) IV Push every 1 hour PRN    REVIEW OF SYSTEMS  --------------------------------------------------------------------------------  Unable to obtain    VITALS/PHYSICAL EXAM:    ICU Vital Signs Last 24 Hrs,    T(C): 36.7 (10 May 2021 04:40), Max: 36.7 (10 May 2021 04:40)  T(F): 98.1 (10 May 2021 04:40), Max: 98.1 (10 May 2021 04:40)  HR: 95 (10 May 2021 04:40) (76 - 95)  BP: 140/82 (10 May 2021 04:40) (123/71 - 140/82)  RR: 18 (10 May 2021 04:40) (18 - 18)  SpO2: 90% (10 May 2021 04:40) (90% - 90%)    --------------------------------------------------------------------------------  T(C): 36.4 (21 @ 04:53), Max: 36.9 (21 @ 17:09)  HR: 80 (21 @ 04:53) (78 - 101)  BP: 115/75 (21 @ 04:53) (113/64 - 136/73)  RR: 19 (21 @ 04:53) (17 - 19)  SpO2: 95% (21 @ 04:53) (95% - 98%)    05-08-21 @ 07:01  -  21 @ 07:00  --------------------------------------------------------  IN: 40 mL / OUT: 0 mL / NET: 40 mL    Physical Exam:              Gen: NAD   	HEENT: PERRL, supple neck, clear oropharynx  	Pulm: CTA B/L  	CV: RRR, S1S2; no rub  	Back: No spinal or CVA tenderness; no sacral edema  	Abd: +BS, soft, nontender/nondistended  	: No suprapubic tenderness  	UE: Warm, FROM, no clubbing, intact strength; no edema; no asterixis  	LE: Warm, FROM, no clubbing, intact strength; no edema  	Neuro: No focal deficits, intact gait  	Psych: Normal affect and mood  	Skin: Warm, without rashes    LABS/STUDIES:    TPro  6.3<L>  /  Alb  2.8<L>  /  TBili  0.3<L>  /  DBili  x      /  AST  20     /  ALT  6      /  AlkPhos  63     08 May 2021 07:58    M.0 mg/dL    ( 07:58)  -------------------------------------------------------------------------------              8.8    11.81 >-----------<  202      [21 @ 07:58]              30.3     144  |  101  |  23.0  ----------------------------<  128      [21 @ 07:58]  4.0   |  24.0  |  4.57        Ca     8.3     [21 @ 07:58]      Mg     2.0     [21 @ 07:58]    TPro  6.3  /  Alb  2.8  /  TBili  0.3  /  DBili  x   /  AST  20  /  ALT  6   /  AlkPhos  63  [21 @ 07:58]    Creatinine Trend:  SCr 4.57 [ 07:58]  SCr 8.37 [ @ 08:03]  SCr 7.81 [ @ 10:12]  SCr 7.41 [ 04:18]  SCr 7.54 [ @ 10:33]    HbA1c 9.4      [20 @ 21:14]  TSH 1.15      [21 @ 08:13]  Lipid: chol 106, , HDL 40, LDL --      [21 @ 04:55]    HBsAb <3.0      [21 @ 02:54]  HBsAg Nonreact      [21 @ 02:54]  HCV 0.22, Nonreact      [21 @ 02:54]    1) ESRd on HD  2)MBD of renal dx  3) Anemia of renal dx  4) Vol HTN

## 2021-05-10 NOTE — PROGRESS NOTE ADULT - SUBJECTIVE AND OBJECTIVE BOX
Palliative Followup  OVERNIGHT EVENTS: central line placed for dialysis- patient remains in restraints - patient planned for HD later today CT scan tomorrow - family meeting with palliative care and medicine team at 2 pm tomorrow to discuss GOC    CC:cva    Present Symptoms:   Dyspnea: 0   Nausea/Vomiting: Unable to obtain due to poor mentation  Anxiety:  Unable to obtain due to poor mentation  Depression: Unable to obtain due to poor mentation  Fatigue: Yes   Loss of appetite: Unable to obtain due to poor mentation    Pain: Appeared comfortable during assessment            Character-            Duration-            Effect-            Factors-            Frequency-            Location-            Severity-    Review of Systems: Reviewed  Per HPI, all other ROS negative  Unable to obtain due to poor mentation     MEDICATIONS  (STANDING):  aspirin Suppository 300 milliGRAM(s) Rectal daily  atorvastatin 40 milliGRAM(s) Oral at bedtime  chlorhexidine 4% Liquid 1 Application(s) Topical <User Schedule>  dextrose 40% Gel 15 Gram(s) Oral once  dextrose 5%. 1000 milliLiter(s) (50 mL/Hr) IV Continuous <Continuous>  dextrose 5%. 1000 milliLiter(s) (100 mL/Hr) IV Continuous <Continuous>  dextrose 50% Injectable 25 Gram(s) IV Push once  dextrose 50% Injectable 12.5 Gram(s) IV Push once  epoetin analilia-epbx (RETACRIT) Injectable 50970 Unit(s) IV Push <User Schedule>  glucagon  Injectable 1 milliGRAM(s) IntraMuscular once  heparin   Injectable 5000 Unit(s) SubCutaneous every 8 hours  insulin lispro (ADMELOG) corrective regimen sliding scale   SubCutaneous every 6 hours  metoprolol tartrate 12.5 milliGRAM(s) Oral every 12 hours  midodrine. 2.5 milliGRAM(s) Oral three times a day  Nephro-debbie 1 Tablet(s) Oral daily  pantoprazole  Injectable 40 milliGRAM(s) IV Push daily  polyethylene glycol 3350 17 Gram(s) Oral daily  senna 2 Tablet(s) Oral at bedtime  valproate sodium IVPB 500 milliGRAM(s) IV Intermittent every 12 hours    MEDICATIONS  (PRN):  acetaminophen    Suspension .. 650 milliGRAM(s) Oral every 6 hours PRN Temp greater or equal to 38C (100.4F), Mild Pain (1 - 3)  LORazepam   Injectable 0.25 milliGRAM(s) IV Push every 6 hours PRN Agitation  sodium chloride 0.9% lock flush 10 milliLiter(s) IV Push every 1 hour PRN Pre/post blood products, medications, blood draw, and to maintain line patency      Vital Signs Last 24 Hrs  T(C): 37 (10 May 2021 09:27), Max: 37 (10 May 2021 09:27)  T(F): 98.6 (10 May 2021 09:27), Max: 98.6 (10 May 2021 09:27)  HR: 87 (10 May 2021 09:27) (87 - 95)  BP: 129/60 (10 May 2021 09:27) (129/60 - 140/82)  BP(mean): --  RR: 18 (10 May 2021 09:27) (18 - 18)  SpO2: 98% (10 May 2021 09:27) (90% - 98%)    General: alert _ confused    Karnofsky:  %__30    HEENT: dry mouth    Lungs: comfortable    CV: tachycardia    GI: incontinent     : incontinent    MSK: weakness  edema generalized     Skin: thin skin, clamy , cool    LABS:                        9.0    9.61  )-----------( 192      ( 10 May 2021 08:55 )             30.6     05-10    139  |  102  |  45.0<H>  ----------------------------<  126<H>  4.2   |  25.0  |  8.17<H>    Ca    8.8      10 May 2021 08:55  Phos  4.6     05-10  Mg     2.3     05-10    TPro  x   /  Alb  2.8<L>  /  TBili  x   /  DBili  x   /  AST  x   /  ALT  x   /  AlkPhos  x   05-10    I&O's Summary    09 May 2021 07:01  -  10 May 2021 07:00  --------------------------------------------------------  IN: 20 mL / OUT: 100 mL / NET: -80 mL      RADIOLOGY & ADDITIONAL STUDIES:  CXR 05.10.21  IMPRESSION:  Left IJ line and NG tube in satisfactory position.  Mild, central pulmonary venous congestion, new    CT head 05.03.21  FINDINGS: An evolving left-sided PCA distribution infarction is again seen withareas of gyral hyperattenuation. Findings may indicate superimposed petechial hemorrhagic transformation versus cortical laminar necrosis versus a combination of both.  No new areas of acute intracranial hemorrhage are seen.  Multiple small chronic infarcts are noted within the right cerebellar hemisphere. There is no hydrocephalus.  There is diffuse cerebral volume loss with prominence of the sulci, fissures, and cisternal spaces which is normal for the patient's age. There is mild periventricular white matter hypoattenuation statistically compatible with microvascular changes given calcific atherosclerotic disease of the intracranial arteries.  The paranasal sinuses and mastoid air cells are clear. The calvarium appears intact. The orbits appear unremarkable apart from cataract removal.  IMPRESSION: Stable follow-up CT examination.    ADVANCE DIRECTIVES: FULL CODE

## 2021-05-10 NOTE — PROGRESS NOTE ADULT - SUBJECTIVE AND OBJECTIVE BOX
Patient seen in dialysis.  Unable to communicate with patient.  Unable to follow commands.   Appears comfortable.    REVIEW OF SYSTEMS  Constitutional - No fever,  +fatigue  Neurological - +loss of strength    FUNCTIONAL PROGRESS  5/10  Progress Summary: Chart reviewed. +attempt to see pt to re-assess swallow. Pt however, off unit at dialysis. Will therefore, re-attempt, as schedule permits. NEGRO ross     5/9  Speech Language Pathology Recommendations: 1) NPO; consider short term non-oral means of nutrition/hydratoin2) Aspiration precautions3) Oral care4) ST to continue to follow to reassess at bedside.     5/4  Bed Mobility: Rolling/Turning:     · Level of [place]	moderate assist (50% patients effort)    Bed Mobility: Sit to Supine:     · Level of [place]	maximum assist (25% patients effort)  · Physical Assist/Nonphysical Assist	2 person assist    Bed Mobility: Supine to Sit:     · Level of [place]	maximum assist (25% patients effort)  · Physical Assist/Nonphysical Assist	2 person assist    Transfer: Sit to Stand:     · Level of [place]	maximum assist (25% patients effort)  · Physical Assist/Nonphysical Assist	2 person assist    Transfer: Stand to Sit:     · Level of [place]	maximum assist (25% patients effort)  · Physical Assist/Nonphysical Assist	2 person assist    Sit/Stand Transfer Safety Analysis:     · Transfer Safety Concerns Noted	unsteadiness throughout, poor balance, verbal cues for sequencing    Gait Skills:     · Level of [place]	unable to perform; safety      VITALS  T(C): 36.7 (05-10-21 @ 04:40), Max: 36.7 (05-10-21 @ 04:40)  HR: 95 (05-10-21 @ 04:40) (76 - 95)  BP: 140/82 (05-10-21 @ 04:40) (123/71 - 140/82)  RR: 18 (05-10-21 @ 04:40) (18 - 18)  SpO2: 90% (05-10-21 @ 04:40) (90% - 90%)  Wt(kg): --    MEDICATIONS   acetaminophen    Suspension .. 650 milliGRAM(s) every 6 hours PRN  aspirin Suppository 300 milliGRAM(s) daily  atorvastatin 40 milliGRAM(s) at bedtime  chlorhexidine 4% Liquid 1 Application(s) <User Schedule>  dextrose 40% Gel 15 Gram(s) once  dextrose 5%. 1000 milliLiter(s) <Continuous>  dextrose 5%. 1000 milliLiter(s) <Continuous>  dextrose 50% Injectable 25 Gram(s) once  dextrose 50% Injectable 12.5 Gram(s) once  epoetin analilia-epbx (RETACRIT) Injectable 41285 Unit(s) <User Schedule>  glucagon  Injectable 1 milliGRAM(s) once  heparin   Injectable 5000 Unit(s) every 8 hours  insulin lispro (ADMELOG) corrective regimen sliding scale   every 6 hours  LORazepam   Injectable 0.25 milliGRAM(s) every 6 hours PRN  metoprolol tartrate Injectable 2.5 milliGRAM(s) every 6 hours  midodrine. 2.5 milliGRAM(s) three times a day  Nephro-debbie 1 Tablet(s) daily  pantoprazole  Injectable 40 milliGRAM(s) daily  polyethylene glycol 3350 17 Gram(s) daily  senna 2 Tablet(s) at bedtime  sodium chloride 0.9% lock flush 10 milliLiter(s) every 1 hour PRN  valproate sodium IVPB 500 milliGRAM(s) every 12 hours      RECENT LABS/IMAGING                          9.0    9.61  )-----------( 192      ( 10 May 2021 08:55 )             30.6     05-10    139  |  102  |  45.0<H>  ----------------------------<  126<H>  4.2   |  25.0  |  8.17<H>    Ca    8.8      10 May 2021 08:55  Phos  4.6     05-10  Mg     2.3     05-10    TPro  x   /  Alb  2.8<L>  /  TBili  x   /  DBili  x   /  AST  x   /  ALT  x   /  AlkPhos  x   05-10                HEAD CT 5/3 - An evolving left-sided PCA distribution infarction is again seen with areas of gyral hyperattenuation. Findings may indicate superimposed petechial hemorrhagic transformation versus cortical laminar necrosis versus a combination of both. No new areas of acute intracranial hemorrhage are seen. Multiple small chronic infarcts are noted within the right cerebellar hemisphere. There is no hydrocephalus. There is diffuse cerebral volume loss with prominence of the sulci, fissures, and cisternal spaces which is normal for the patient's age. There is mild periventricular white matter hypoattenuation statistically compatible with microvascular changes given calcific atherosclerotic disease of the intracranial arteries. The paranasal sinuses and mastoid air cells are clear. The calvarium appears intact. The orbits appear unremarkable apart from cataract removal.      ----------------------------------------------------------------------------------------  PHYSICAL EXAM  Constitutional - NAD, Appears comfortable  Extremities - Left UE swelling, Right mitten  Neurologic Exam -                    Cognitive - Awake/Alert     Communication - Attempts to verbalize     Cranial Nerves - Right facial droop     Motor - Unable to fully assess - unable to follow commands                    LEFT    UE - Able to hold arm up when lifted                     RIGHT UE - No discernable movement                    LEFT    LE - Wtihdraws                    RIGHT LE - No discernable movement     Sensory - Unable to fully assess     Coordination - Impaired  Psychiatric - Calm  ------------------------------------------------------------------------------------------------  ASSESSMENT/PLAN  71yFemale with functional deficits after developing an acute CVA  Left PCA CVA with hemorrhagic conversion - ASA, Lipitor  HTN - Lopressor  HypoTN - Midodrine  CRF - HD  Mood - Ativan, Depakote  Pain - Tylenol  Oropharyngeal Dysphagia - NPO, +NGT  DVT PPX - SCDs, Heparin  Rehab - Patient medically being optimized, currently NPO. As per palliative care, appears family wants to pursue ongoing aggressive treatment despite impairments and medical comorbidities. At this time, recommend CURRY, patient DOES NOT meet acute inpatient rehabilitation criteria. Patient needs a more prolonged stay to achieve transition to community living and would not be able to tolerate a comprehensive/intense rehab program of 3hours/day.     Continue mobilization by staff to maintain cardiopulmonary function and prevention of secondary complications related to debility.     Discussed with rehab team.

## 2021-05-10 NOTE — PROGRESS NOTE ADULT - ASSESSMENT
72 y/o female with PMHx CAD s/p stents '07, HTN, MI, PAF, liver abscess, s/p biliary stent with removal (11/2018; 7/2/19), chronic pancreatitis, DM2 on insulin, diabetic neuropathy, ESRD (5/2018) on HD (M/W/F since 5/18) presents to [facility] as a transfer from [place] after she went to HD with and had  an episode of syncope and change in mental status. HD RN reported she became unresponsive, L gaze preference, and was perseverating. Patient went for emergent CTH which revealed evolving L posterior cerebral artery infarct with new hemorrhage. Patient was subsequently intubated due to concern for deteriorating. Patient was then transferred to [facility].     Of note, patient was recently admitted to [place] on 4/12/21 for hypoxic/hypercapnic resp failure and hyperkalemia secondary to ESRD. While admitted pt was found to be confused, MRI obtained revealed large acute infarct of the left PCA. Pt was originally on Asprin which was changed to Plavix. Patient was discharged to rehab on Plavix.    Patient brought to NSICU and seen by nephro and continued on HD. Patient was extubated on 5/1. Patient seen by vascular for low flow out of the fistula and had a left femoral placed for HD. Patient downgraded to medical floor.        #Acute metabolic encephalopathy - secondary to multiple cvas in the past month  asa and statin and depakote  neurology following  as per neurosurgery repeat ct head on 5.11 to reassess on when to start AC  not improving  ng tube replaced multiple times, currently on wrist restraints  ammonia, tsh WNL    #CVA - in setting of afib  plan as above  neuro following    #dm2 - elevated a1c 8.7  c.w ssi    #afib -   Will change to metoprolol 12.5mg BID via NGT  no dig as per EP   decision on AC on 5/11 after repeat CT    #esrd - renal following   c.w HD  vascular following - eventual fistulogram  s/p left ij replaced    #diet - ng tube feeds   nutrition consult     prognosis poor to grim   palliative following  - family wants full code

## 2021-05-10 NOTE — CHART NOTE - NSCHARTNOTEFT_GEN_A_CORE
Source: Patient [ ]  Family [ ]   other [x] RN    Current Diet:   Diet, NPO with Tube Feed:   Tube Feeding Modality: Nasogastric  Nepro (NEPRORTH)  Total Volume for 24 Hours (mL): 480  Continuous  Starting Tube Feed Rate {mL per Hour}: 10  Increase Tube Feed Rate by (mL): 10     Every 8 hours  Until Goal Tube Feed Rate (mL per Hour): 20  Tube Feed Duration (in Hours): 24  Tube Feed Start Time: 12:00 (05-09-21 @ 10:16)    Enteral /Parenteral Nutrition: Current TF order reads for Nepro 1.8cal @ goal rate 20mL/hr (x 20 hrs) to provide 400mL daily (720cal, 32g protein    Current Weight:   (5/7)    189.8 lbs  (5/5)    190.2 lbs  (5/3)    363.3 lbs  (5/1)    365.3 lbs  (4/30)   364.8 lbs  (4/29)  196.6 lbs    % Weight Change: Weight loss since admission possible, will continue to monitor     Pertinent Medications: MEDICATIONS  (STANDING):  aspirin Suppository 300 milliGRAM(s) Rectal daily  atorvastatin 40 milliGRAM(s) Oral at bedtime  chlorhexidine 4% Liquid 1 Application(s) Topical <User Schedule>  dextrose 40% Gel 15 Gram(s) Oral once  dextrose 5%. 1000 milliLiter(s) (50 mL/Hr) IV Continuous <Continuous>  dextrose 5%. 1000 milliLiter(s) (100 mL/Hr) IV Continuous <Continuous>  dextrose 50% Injectable 25 Gram(s) IV Push once  dextrose 50% Injectable 12.5 Gram(s) IV Push once  epoetin analilia-epbx (RETACRIT) Injectable 69051 Unit(s) IV Push <User Schedule>  glucagon  Injectable 1 milliGRAM(s) IntraMuscular once  heparin   Injectable 5000 Unit(s) SubCutaneous every 8 hours  insulin lispro (ADMELOG) corrective regimen sliding scale   SubCutaneous every 6 hours  midodrine. 2.5 milliGRAM(s) Oral three times a day  Nephro-debbie 1 Tablet(s) Oral daily  pantoprazole  Injectable 40 milliGRAM(s) IV Push daily  polyethylene glycol 3350 17 Gram(s) Oral daily  senna 2 Tablet(s) Oral at bedtime  valproate sodium IVPB 500 milliGRAM(s) IV Intermittent every 12 hours    MEDICATIONS  (PRN):  acetaminophen    Suspension .. 650 milliGRAM(s) Oral every 6 hours PRN Temp greater or equal to 38C (100.4F), Mild Pain (1 - 3)  LORazepam   Injectable 0.25 milliGRAM(s) IV Push every 6 hours PRN Agitation  sodium chloride 0.9% lock flush 10 milliLiter(s) IV Push every 1 hour PRN Pre/post blood products, medications, blood draw, and to maintain line patency    Pertinent Labs: CBC Full  -  ( 10 May 2021 08:55 )  WBC Count : 9.61 K/uL  RBC Count : 3.04 M/uL  Hemoglobin : 9.0 g/dL  Hematocrit : 30.6 %  Platelet Count - Automated : 192 K/uL  Mean Cell Volume : 100.7 fl  Mean Cell Hemoglobin : 29.6 pg  Mean Cell Hemoglobin Concentration : 29.4 gm/dL  05-10 Na139 mmol/L Glu 126 mg/dL<H> K+ 4.2 mmol/L Cr  8.17 mg/dL<H> BUN 45.0 mg/dL<H> Phos 4.6 mg/dL Alb 2.8 g/dL<L> PAB n/a       Skin: No skin breakdown noted   edema: 1+ generalized edema, 2+ R. arm/wrist/hand    Estimated Needs:   [x] no change since previous assessment  [ ] recalculated:     Current Nutrition Diagnosis: Pt at high nutrition risk secondary to malnutrition (moderate acute) related to inability to consume sufficient protein-energy needs 2/2 ESRD on HD, s/p SDH, failed bedside SLP eval with continued refusal to accept PO as evidenced by meeting <50% EER x 5 days, 2+ generalized edema likely underlying weight loss.   Pt SLP eval 5/9 continue to recommend NPO. NGT replaced 5/9 2/2 unknown removal.  Plan being a repeat ct head on 5/11 as per Neurosurgery. TF at goal continues to not meet Pt's needs, discussed with staff. Order from RD pending MD approval.     Recommendations:   1) As medically feasible, recommend Nepro -- initiated at 20 ml/hr and advance 10 ml/hr q4 hrs until goal rate of 55 ml/hr (x20 hrs) to provide 1100 ml, 1980 kcal, 89g protein, 800 ml free water, and >100% of RDIs for vitamins/minerals. Additional free water per MD discretion.   2) Continue Nephro-debbie daily   3) Monitor TF tolerance/bowel function  4) Continue SLP intervention as feasible  5) Monitor weights daily for trend/accuracy     Monitoring and Evaluation:   [ ] PO intake [ ] Tolerance to diet prescription [X] Weights  [X] Follow up per protocol [X] Labs:

## 2021-05-10 NOTE — PROGRESS NOTE ADULT - SUBJECTIVE AND OBJECTIVE BOX
CC: CVA with hemorrhagic transformation (10 May 2021 08:15)    HPI:  70 y/o female with PMHx CAD s/p stents '07, HTN, MI, PAF, liver abscess, s/p biliary stent with removal (11/2018; 7/2/19), chronic pancreatitis, DM2 on insulin, diabetic neuropathy, ESRD (5/2018) on HD (M/W/F since 5/18) presents to [facility] as a transfer from [facility] after she went to HD with and had  an episode of syncope and change in mental status. HD RN reported she became unresponsive, L gaze preference, and was perseverating. Patient went for emergent CTH which revealed evolving L posterior cerebral artery infarct with new hemorrhage. Patient was subsequently intubated due to concern for deteriorating. Patient was then transferred to [facility].     Of note, patient was recently admitted to [facility] on 4/12/21 for hypoxic/hypercapnic resp failure and hyperkalemia secondary to ESRD. While admitted pt was found to be confused, MRI obtained revealed large acute infarct of the left PCA. Pt was originally on Asprin which was changed to Plavix. Patient was discharged to rehab on Plavix.    Patient brought to NSICU and seen by nephro and continued on HD. Patient was extubated on 5/1. Patient seen by vascular for low flow out of the fistula and had a left femoral placed for HD. Patient downgraded to medical floor.    INTERVAL HPI/OVERNIGHT EVENTS:  Patient seen and examined lying in bed.  Patient confused and ROS limited.      Vital Signs Last 24 Hrs  T(C): 37 (10 May 2021 09:27), Max: 37 (10 May 2021 09:27)  T(F): 98.6 (10 May 2021 09:27), Max: 98.6 (10 May 2021 09:27)  HR: 87 (10 May 2021 09:27) (87 - 95)  BP: 129/60 (10 May 2021 09:27) (129/60 - 140/82)  BP(mean): --  RR: 18 (10 May 2021 09:27) (18 - 18)  SpO2: 98% (10 May 2021 09:27) (90% - 98%)  I&O's Detail    09 May 2021 07:01  -  10 May 2021 07:00  --------------------------------------------------------  IN:    Glucerna 1.5: 20 mL  Total IN: 20 mL    OUT:    Nasogastric/Oral tube (mL): 100 mL  Total OUT: 100 mL    Total NET: -80 mL    PHYSICAL EXAM:  GENERAL: NAD  HEAD:  Atraumatic, Normocephalic  NECK: Supple, No JVD, Normal thyroid  NERVOUS SYSTEM:  Alert, generalized weakness  CHEST/LUNG: Bilateral air entry  HEART: Regular rate and rhythm; No murmurs, rubs, or gallops  ABDOMEN: Soft, Nontender, Nondistended; Bowel sounds present, NGT with tube feeds  EXTREMITIES:  2+ Peripheral Pulses, No clubbing, cyanosis, or edema                            9.0    9.61  )-----------( 192      ( 10 May 2021 08:55 )             30.6     10 May 2021 08:55    139    |  102    |  45.0   ----------------------------<  126    4.2     |  25.0   |  8.17     Ca    8.8        10 May 2021 08:55  Phos  4.6       10 May 2021 08:55  Mg     2.3       10 May 2021 08:19    TPro  x      /  Alb  2.8    /  TBili  x      /  DBili  x      /  AST  x      /  ALT  x      /  AlkPhos  x      10 May 2021 08:55      CAPILLARY BLOOD GLUCOSE  POCT Blood Glucose.: 161 mg/dL (10 May 2021 11:41)  POCT Blood Glucose.: 186 mg/dL (10 May 2021 05:32)  POCT Blood Glucose.: 120 mg/dL (09 May 2021 22:58)  POCT Blood Glucose.: 127 mg/dL (09 May 2021 17:41)    LIVER FUNCTIONS - ( 10 May 2021 08:55 )  Alb: 2.8 g/dL / Pro: x     / ALK PHOS: x     / ALT: x     / AST: x     / GGT: x               MEDICATIONS  (STANDING):  aspirin Suppository 300 milliGRAM(s) Rectal daily  atorvastatin 40 milliGRAM(s) Oral at bedtime  chlorhexidine 4% Liquid 1 Application(s) Topical <User Schedule>  dextrose 40% Gel 15 Gram(s) Oral once  dextrose 5%. 1000 milliLiter(s) (50 mL/Hr) IV Continuous <Continuous>  dextrose 5%. 1000 milliLiter(s) (100 mL/Hr) IV Continuous <Continuous>  dextrose 50% Injectable 25 Gram(s) IV Push once  dextrose 50% Injectable 12.5 Gram(s) IV Push once  epoetin analilia-epbx (RETACRIT) Injectable 82039 Unit(s) IV Push <User Schedule>  glucagon  Injectable 1 milliGRAM(s) IntraMuscular once  heparin   Injectable 5000 Unit(s) SubCutaneous every 8 hours  insulin lispro (ADMELOG) corrective regimen sliding scale   SubCutaneous every 6 hours  midodrine. 2.5 milliGRAM(s) Oral three times a day  Nephro-debbie 1 Tablet(s) Oral daily  pantoprazole  Injectable 40 milliGRAM(s) IV Push daily  polyethylene glycol 3350 17 Gram(s) Oral daily  senna 2 Tablet(s) Oral at bedtime  valproate sodium IVPB 500 milliGRAM(s) IV Intermittent every 12 hours    MEDICATIONS  (PRN):  acetaminophen    Suspension .. 650 milliGRAM(s) Oral every 6 hours PRN Temp greater or equal to 38C (100.4F), Mild Pain (1 - 3)  LORazepam   Injectable 0.25 milliGRAM(s) IV Push every 6 hours PRN Agitation  sodium chloride 0.9% lock flush 10 milliLiter(s) IV Push every 1 hour PRN Pre/post blood products, medications, blood draw, and to maintain line patency      RADIOLOGY & ADDITIONAL TESTS:  < from: Xray Chest 1 View- PORTABLE-Urgent (Xray Chest 1 View- PORTABLE-Urgent .) (05.10.21 @ 12:05) >   EXAM:  XR CHEST PORTABLE URGENT 1V                          PROCEDURE DATE:  05/10/2021          INTERPRETATION:  Clinical history: 71-year-old female, line placement.    Two expiratory/kyphotic views are compared to 5/9/2021 and demonstrate a large bore left IJ line with the tip in the SVC and no pneumothorax, new. NG tube in satisfactory position, unchanged.    Cardiac silhouette and pulmonary vasculature are within normal limits with no gross consolidation, effusion, pneumothorax or acute osseous finding.    Mild, central pulmonary venous congestion, new.    IMPRESSION:  Left IJ line and NG tube in satisfactory position.    Mild, central pulmonary venous congestion, new            CHARY PRICE DO; Attending Radiologist  This document has been electronically signed. May 10 2021 12:33PM    < end of copied text >

## 2021-05-10 NOTE — PROGRESS NOTE ADULT - PROBLEM SELECTOR PLAN 4
-Call placed to Kenny (daughter-in-law) to touch base and schedule family meeting   Kenny and her  (patient's son) to come in tomorrow 2pm for family meeting with medicine team and palliative care to discuss overall GOC , clarify who is making decisions on behalf of the patient - answer any questions/concerns family pay having moving forward  -Code Status: Full Code  -Will continue to support and follow     Total Time Spent___35_ minutes  This includes chart review, patient assessment, discussion and collaboration with interdisciplinary team members, ACP planning    Thank you for the opportunity to assist with the care of this patient.   [facility] Consult [facility] [phone].

## 2021-05-11 LAB
GLUCOSE BLDC GLUCOMTR-MCNC: 113 MG/DL — HIGH (ref 70–99)
GLUCOSE BLDC GLUCOMTR-MCNC: 127 MG/DL — HIGH (ref 70–99)
GLUCOSE BLDC GLUCOMTR-MCNC: 134 MG/DL — HIGH (ref 70–99)
GLUCOSE BLDC GLUCOMTR-MCNC: 151 MG/DL — HIGH (ref 70–99)
HBV CORE AB SER-ACNC: SIGNIFICANT CHANGE UP
HBV SURFACE AB SER-ACNC: <3 MIU/ML — LOW
HBV SURFACE AG SER-ACNC: SIGNIFICANT CHANGE UP
HCV AB S/CO SERPL IA: 0.16 S/CO — SIGNIFICANT CHANGE UP (ref 0–0.99)
HCV AB SERPL-IMP: SIGNIFICANT CHANGE UP

## 2021-05-11 PROCEDURE — 99232 SBSQ HOSP IP/OBS MODERATE 35: CPT

## 2021-05-11 PROCEDURE — 99233 SBSQ HOSP IP/OBS HIGH 50: CPT

## 2021-05-11 PROCEDURE — 99497 ADVNCD CARE PLAN 30 MIN: CPT | Mod: 25

## 2021-05-11 PROCEDURE — 70450 CT HEAD/BRAIN W/O DYE: CPT | Mod: 26

## 2021-05-11 RX ORDER — APIXABAN 2.5 MG/1
5 TABLET, FILM COATED ORAL EVERY 12 HOURS
Refills: 0 | Status: DISCONTINUED | OUTPATIENT
Start: 2021-05-11 | End: 2021-05-12

## 2021-05-11 RX ORDER — VALPROIC ACID (AS SODIUM SALT) 250 MG/5ML
250 SOLUTION, ORAL ORAL EVERY 8 HOURS
Refills: 0 | Status: DISCONTINUED | OUTPATIENT
Start: 2021-05-11 | End: 2021-05-14

## 2021-05-11 RX ADMIN — SENNA PLUS 2 TABLET(S): 8.6 TABLET ORAL at 21:22

## 2021-05-11 RX ADMIN — PANTOPRAZOLE SODIUM 40 MILLIGRAM(S): 20 TABLET, DELAYED RELEASE ORAL at 12:52

## 2021-05-11 RX ADMIN — Medication 26.25 MILLIGRAM(S): at 21:22

## 2021-05-11 RX ADMIN — Medication 27.5 MILLIGRAM(S): at 10:21

## 2021-05-11 RX ADMIN — HEPARIN SODIUM 5000 UNIT(S): 5000 INJECTION INTRAVENOUS; SUBCUTANEOUS at 05:25

## 2021-05-11 RX ADMIN — Medication 0.25 MILLIGRAM(S): at 09:23

## 2021-05-11 RX ADMIN — APIXABAN 5 MILLIGRAM(S): 2.5 TABLET, FILM COATED ORAL at 17:48

## 2021-05-11 RX ADMIN — Medication 2: at 00:03

## 2021-05-11 RX ADMIN — Medication 2: at 12:51

## 2021-05-11 RX ADMIN — Medication 12.5 MILLIGRAM(S): at 17:49

## 2021-05-11 RX ADMIN — ATORVASTATIN CALCIUM 40 MILLIGRAM(S): 80 TABLET, FILM COATED ORAL at 21:22

## 2021-05-11 RX ADMIN — MIDODRINE HYDROCHLORIDE 2.5 MILLIGRAM(S): 2.5 TABLET ORAL at 17:49

## 2021-05-11 RX ADMIN — Medication 300 MILLIGRAM(S): at 15:42

## 2021-05-11 RX ADMIN — CHLORHEXIDINE GLUCONATE 1 APPLICATION(S): 213 SOLUTION TOPICAL at 05:13

## 2021-05-11 NOTE — PROGRESS NOTE ADULT - SUBJECTIVE AND OBJECTIVE BOX
Chief Complaint:  ESRD HD  24 hour events/subjective:  Seen/examined  HD MWF,    PAST HISTORY  --------------------------------------------------------------------------------  No significant changes to PMH, PSH, FHx, SHx, unless otherwise noted    ALLERGIES & MEDICATIONS  --------------------------------------------------------------------------------  Allergies    ertapenem (Urticaria)  Purell (Rash)    Standing Inpatient Medications  aspirin Suppository 300 milliGRAM(s) Rectal daily  atorvastatin 40 milliGRAM(s) Oral at bedtime  chlorhexidine 4% Liquid 1 Application(s) Topical <User Schedule>  dextrose 40% Gel 15 Gram(s) Oral once  dextrose 5%. 1000 milliLiter(s) IV Continuous <Continuous>  dextrose 5%. 1000 milliLiter(s) IV Continuous <Continuous>  dextrose 50% Injectable 25 Gram(s) IV Push once  dextrose 50% Injectable 12.5 Gram(s) IV Push once  epoetin analilia-epbx (RETACRIT) Injectable 44914 Unit(s) IV Push <User Schedule>  glucagon  Injectable 1 milliGRAM(s) IntraMuscular once  heparin   Injectable 5000 Unit(s) SubCutaneous every 8 hours  insulin lispro (ADMELOG) corrective regimen sliding scale   SubCutaneous every 6 hours  metoprolol tartrate Injectable 2.5 milliGRAM(s) IV Push every 6 hours  midodrine. 2.5 milliGRAM(s) Oral three times a day  Nephro-debbie 1 Tablet(s) Oral daily  pantoprazole  Injectable 40 milliGRAM(s) IV Push daily  polyethylene glycol 3350 17 Gram(s) Oral daily  senna 2 Tablet(s) Oral at bedtime  valproate sodium IVPB 500 milliGRAM(s) IV Intermittent every 12 hours    PRN Inpatient Medications  acetaminophen    Suspension .. 650 milliGRAM(s) Oral every 6 hours PRN  LORazepam   Injectable 0.25 milliGRAM(s) IV Push every 6 hours PRN  sodium chloride 0.9% lock flush 10 milliLiter(s) IV Push every 1 hour PRN    REVIEW OF SYSTEMS  --------------------------------------------------------------------------------  Unable to obtain    VITALS/PHYSICAL EXAM:    ICU Vital Signs Last 24 Hrs,    T(C): 36.7 (10 May 2021 04:40), Max: 36.7 (10 May 2021 04:40)  T(F): 98.1 (10 May 2021 04:40), Max: 98.1 (10 May 2021 04:40)  HR: 95 (10 May 2021 04:40) (76 - 95)  BP: 140/82 (10 May 2021 04:40) (123/71 - 140/82)  RR: 18 (10 May 2021 04:40) (18 - 18)  SpO2: 90% (10 May 2021 04:40) (90% - 90%)    --------------------------------------------------------------------------------  T(C): 36.4 (21 @ 04:53), Max: 36.9 (21 @ 17:09)  HR: 80 (21 @ 04:53) (78 - 101)  BP: 115/75 (21 @ 04:53) (113/64 - 136/73)  RR: 19 (21 @ 04:53) (17 - 19)  SpO2: 95% (21 @ 04:53) (95% - 98%)    05-08-21 @ 07:01  -  21 @ 07:00  --------------------------------------------------------  IN: 40 mL / OUT: 0 mL / NET: 40 mL    Physical Exam:              Gen: NAD   	HEENT: PERRL, supple neck, clear oropharynx  	Pulm: CTA B/L  	CV: RRR, S1S2; no rub  	Back: No spinal or CVA tenderness; no sacral edema  	Abd: +BS, soft, nontender/nondistended  	: No suprapubic tenderness  	UE: Warm, FROM, no clubbing, intact strength; no edema; no asterixis  	LE: Warm, FROM, no clubbing, intact strength; no edema  	Neuro: No focal deficits, intact gait  	Psych: Normal affect and mood  	Skin: Warm, without rashes    LABS/STUDIES:    TPro  6.3<L>  /  Alb  2.8<L>  /  TBili  0.3<L>  /  DBili  x      /  AST  20     /  ALT  6      /  AlkPhos  63     08 May 2021 07:58    M.0 mg/dL    ( 07:58)  -------------------------------------------------------------------------------              8.8    11.81 >-----------<  202      [21 @ 07:58]              30.3     144  |  101  |  23.0  ----------------------------<  128      [21 @ 07:58]  4.0   |  24.0  |  4.57        Ca     8.3     [21 @ 07:58]      Mg     2.0     [21 @ 07:58]    TPro  6.3  /  Alb  2.8  /  TBili  0.3  /  DBili  x   /  AST  20  /  ALT  6   /  AlkPhos  63  [21 @ 07:58]    Creatinine Trend:  SCr 4.57 [ 07:58]  SCr 8.37 [ 08:03]  SCr 7.81 [ 10:12]  SCr 7.41 [ 04:18]  SCr 7.54 [ 10:33]    HbA1c 9.4      [20 21:14]  TSH 1.15      [21 @ 08:13]  Lipid: chol 106, , HDL 40, LDL --      [21 @ 04:55]    HBsAb <3.0      [21 @ 02:54]  HBsAg Nonreact      [21 02:54]  HCV 0.22, Nonreact      [21 02:54]    1) ESRd on HD  2)MBD of renal dx  3) Anemia of renal dx  4) Volume Status,     Hypertension and chronic kidney disease (CKD) are closely interlinked pathophysiologic states, such that sustained hypertension can lead to worsening kidney function and progressive decline in kidney function can conversely lead to worsening blood pressure (BP) control. The pathophysiology of hypertension in CKD is complex and is a sequela of multiple factors, including reduced nephron mass, increased sodium retention and extracellular volume expansion, sympathetic nervous system overactivity, activation of hormones including the renin-angiotensin-aldosterone system, and endothelial dysfunction. Currently, the treatment target for patients with CKD is a clinic systolic BP < 130mm Hg. The main approaches to the management of hypertension in CKD include dietary salt restriction, initiation of treatment with angiotensin-converting enzyme inhibitors or angiotensin receptor blockers, and diuretic therapy. Uncontrolled hypertension can lead to significant cardiovascular morbidity and mortality and accelerate progression to end-stage kidney disease. Although intensive BP control has not been shown in clinical trials to slow the progression of CKD, intensive BP control reduces the risk for adverse cardiovascular outcomes and mortality in the CKD population.    Access : L - IJc    Wrists in Restraints,    + NG,    Remains Confused,

## 2021-05-11 NOTE — PROGRESS NOTE ADULT - SUBJECTIVE AND OBJECTIVE BOX
[facility]                                        [facility] [place]                                 Steve Gamboa, & Farshad                                  [address]                                        [place], [place], [address]                                             [phone]        CC: Stroke    HPI:   The patient is a 71y Female who presented as transfer from [facility] on 4/28/21 with syncope and AMS at dialysis. She had elevated WBC and sepsis workup was done.  She had event of unresponsiveness, left gaze preference and perseveration  Head CT showed evolving left PCA stroke with heme.  She was intubated for airway protection and transferred to [facility]     Interim history:  On [address].   Lethargic.     ROS:   Unobtainable due to patient's condition.     MEDICATIONS  (STANDING):  aspirin Suppository 300 milliGRAM(s) Rectal daily  atorvastatin 40 milliGRAM(s) Oral at bedtime  chlorhexidine 4% Liquid 1 Application(s) Topical <User Schedule>  dextrose 40% Gel 15 Gram(s) Oral once  dextrose 5%. 1000 milliLiter(s) (50 mL/Hr) IV Continuous <Continuous>  dextrose 5%. 1000 milliLiter(s) (100 mL/Hr) IV Continuous <Continuous>  dextrose 50% Injectable 25 Gram(s) IV Push once  dextrose 50% Injectable 12.5 Gram(s) IV Push once  epoetin analilia-epbx (RETACRIT) Injectable 17798 Unit(s) IV Push <User Schedule>  glucagon  Injectable 1 milliGRAM(s) IntraMuscular once  heparin   Injectable 5000 Unit(s) SubCutaneous every 8 hours  insulin lispro (ADMELOG) corrective regimen sliding scale   SubCutaneous every 6 hours  metoprolol tartrate 12.5 milliGRAM(s) Oral every 12 hours  midodrine. 2.5 milliGRAM(s) Oral three times a day  Nephro-debbie 1 Tablet(s) Oral daily  pantoprazole  Injectable 40 milliGRAM(s) IV Push daily  polyethylene glycol 3350 17 Gram(s) Oral daily  senna 2 Tablet(s) Oral at bedtime  valproate sodium IVPB 500 milliGRAM(s) IV Intermittent every 12 hours      Vital Signs Last 24 Hrs  T(C): 36.3 (11 May 2021 11:01), Max: 37.2 (10 May 2021 17:00)  T(F): 97.4 (11 May 2021 11:01), Max: 99 (10 May 2021 17:00)  HR: 87 (11 May 2021 11:01) (87 - 114)  BP: 111/71 (11 May 2021 11:01) (101/67 - 130/55)  RR: 19 (11 May 2021 11:01) (18 - 19)  SpO2: 96% (11 May 2021 04:20) (96% - 100%)    Detailed Neurologic Exam:    Mental status: Lethargic. Opens eyes to voice/stimuli. Not following instructions.    Cranial nerves: Pupils equal and react symmetrically to light. There is no blink to threat on right.  Extraocular motion is full. There is no ptosis. Facial sensation is intact. Facial musculature is symmetric.    Motor: There is normal bulk and tone.  There is no tremor.  moves right less than left to stimuli    Sensation: grimace to light pinch b/l    Reflexes: 1+ throughout and plantar responses are equivocal.    Cerebellar: unable to assess dysmetria on finger to nose testing.    Labs:     05-10    139  |  102  |  45.0<H>  ----------------------------<  126<H>  4.2   |  25.0  |  8.17<H>    Ca    8.8      10 May 2021 08:55  Phos  4.6     05-10  Mg     2.3     05-10    TPro  x   /  Alb  2.8<L>  /  TBili  x   /  DBili  x   /  AST  x   /  ALT  x   /  AlkPhos  x   05-10                            9.0    9.61  )-----------( 192      ( 10 May 2021 08:55 )             30.6       Rad:   CT head images reviewed (and concur with report): There is no change from prior study.   There is subacute left occipitotemporal infarct with increased gyral attenuation.

## 2021-05-11 NOTE — PROVIDER CONTACT NOTE (OTHER) - BACKGROUND
IJ for HD M/W/F
Pt will need new orders for PT evaluate and treat due to change in status. PT to sign off until need orders are received. Thank you
PT taked meds and feeds via NGT. SABINE for HD m/w/f

## 2021-05-11 NOTE — PROGRESS NOTE ADULT - ASSESSMENT
72 y/o female with PMHx CAD s/p stents '07, HTN, MI, PAF, liver abscess, s/p biliary stent with removal (11/2018; 7/2/19), chronic pancreatitis, DM2 on insulin, diabetic neuropathy, ESRD (5/2018) on HD (M/W/F since 5/18) presents to [facility] as a transfer from [place] after she went to HD with and had  an episode of syncope and change in mental status. HD RN reported she became unresponsive, L gaze preference, and was perseverating. Patient went for emergent CTH which revealed evolving L posterior cerebral artery infarct with new hemorrhage. Patient was subsequently intubated due to concern for deteriorating. Patient was then transferred to [facility].     Of note, patient was recently admitted to [facility] on 4/12/21 for hypoxic/hypercapnic resp failure and hyperkalemia secondary to ESRD. While admitted pt was found to be confused, MRI obtained revealed large acute infarct of the left PCA. Pt was originally on Asprin which was changed to Plavix. Patient was discharged to rehab on Plavix.    Patient brought to NSICU and seen by nephro and continued on HD. Patient was extubated on 5/1. Patient seen by vascular for low flow out of the fistula and had a left femoral placed for HD. Patient downgraded to medical floor.        #Acute metabolic encephalopathy - secondary to multiple cvas in the past month  asa and statin and depakote  neurology following  as per neurosurgery repeat ct head on 5.11 to reassess on when to start AC - Repeat CT Head unchanged. Will start AC.  ng tube replaced multiple times, currently on wrist restraints, S/S recommended advancing to puree with honey.    ammonia, tsh WNL    #CVA - in setting of afib  plan as above  neuro following    #dm2 - elevated a1c 8.7  c.w ssi    #afib -   Continue metoprolol 12.5mg BID   no dig as per EP   Will start AC today.    #esrd - renal following   c.w HD  vascular following - eventual fistulogram  s/p left ij replaced, patient pulled out 5/10 overnight.  Vascular to hold on replacement until 5/12.    #diet -  nutrition consulted  Will start diet as per speech     prognosis poor to grim   palliative following  - family wants full code, Family meeting scheduled for today.

## 2021-05-11 NOTE — PROGRESS NOTE ADULT - PROBLEM SELECTOR PLAN 4
-Call placed to Kenny (daughter-in-law) to touch base and schedule family meeting   -Family meeting for 2 pm - will update document post family meeting   call in the interim with any questions/concerns -Call placed to Kenny (daughter-in-law) to touch base and schedule family meeting   -Extensive family meeting held - Kenny (daughter in law)  (patient's son) Jose Antonio and patient's  Dominic Black. Dominic verbally consented to allow his son Jose Antonio and his wife Kenny to make health care decisions for his wife, Javier.   - Discussed along with NP Tali ALMANZA on [address] [place] regarding patient's overall prognosis, diagnosis, treatment options - including HD/need for new access, advancing diet [risk of aspiration], option for conservative management with hospice support at home, and answered all questions/concerns family had.   -At baseline family states "on a good day, her mentation with understanding is 50%, but most days she's near 0" Family expressed understanding that she has had stroke in the past, and this hospitalization, and is at risk for having another stroke.   - At this time family wants to continue to be aggressive with treatment, medically optimize, and continue with HD (including being evaluated by vascular for port access)   - Will continue to support and follow     Total Time Spent__50__ minutes  This includes chart review, patient assessment, discussion and collaboration with interdisciplinary team members, ACP planning  30 minutes on GOC / ACP at bedside with family     Thank you for the opportunity to assist with the care of this patient.   [facility] Consult Service [phone]. -Call placed to Kenny (daughter-in-law) to touch base and schedule family meeting   -Extensive family meeting held - Kenny (daughter in law)  (patient's son) Jose Antonio and patient's  Dominic Black. Dominic verbally consented to allow his son Jose Antonio and his wife Kenny to make health care decisions for his wife, Javier.   - Discussed along with NP Tali ALMANZA on [address] [place] regarding patient's overall prognosis, diagnosis, treatment options - including HD/need for new access, advancing diet [risk of aspiration], option for conservative management with hospice support at home, and answered all questions/concerns family had.   -At baseline family states "on a good day, her mentation with understanding is 50%, but most days she's near 0" Family expressed understanding that she has had stroke in the past, and this hospitalization, and is at risk for having another stroke.   - At this time family wants to continue to be aggressive with treatment, medically optimize, and continue with HD (including being evaluated by vascular for port access)   -Reconfirmed, in the event of cardiopulmonary arrest, patient to have CPR and INTUBATION - all family members agreed ( Kenny, Jose Antonio, and Dominic)   - Will continue to support and follow     Total Time Spent__50__ minutes  This includes chart review, patient assessment, discussion and collaboration with interdisciplinary team members, ACP planning  30 minutes on GOC / ACP at bedside with family     Thank you for the opportunity to assist with the care of this patient.   [facility] Consult Service [phone]. -Call placed to Kenny (daughter-in-law) to touch base and schedule family meeting   -Extensive family meeting held -  Kenny (daughter in law) Jose Antonio (patient's son) and patient's  Dominic Black. Dominic verbally consented to allow his son Jose Antonio and his wife Kenny to make health care decisions for his wife, Javier.   - Discussed along with NP Tali ALMANZA on 6 [place] regarding patient's overall prognosis, diagnosis, treatment options - including HD/need for new access, advancing diet [risk of aspiration], option for conservative management with hospice support at home, and answered all questions/concerns family had.   -At baseline family states "on a good day, her mentation with understanding is 50%, but most days she's near 0" Family expressed understanding that she has had stroke in the past, and this hospitalization, and is at risk for having another stroke.   - At this time family wants to continue to be aggressive with treatment, medically optimize, and continue with HD (including being evaluated by vascular for port access)   -Reconfirmed, in the event of cardiopulmonary arrest, patient to have CPR and INTUBATION - all family members agreed ( Kenny, Jose Antonio, and Dominic)   - Will continue to support and follow     Total Time Spent__50__ minutes  This includes chart review, patient assessment, discussion and collaboration with interdisciplinary team members, ACP planning  30 minutes on GOC / ACP at bedside with family     Thank you for the opportunity to assist with the care of this patient.   [facility] Consult Service [phone].

## 2021-05-11 NOTE — PROGRESS NOTE ADULT - SUBJECTIVE AND OBJECTIVE BOX
Patient with fatigue.  Perseverated on saying "OK thank you".  Appears to have improved verbalization.  Limited motor exam, now in bilateral wrist restraints.     REVIEW OF SYSTEMS  Constitutional - No fever,  +fatigue  Neurological - +memory loss, +loss of strength    FUNCTIONAL PROGRESS  5/10  Speech Language Pathology Recommendations: 1. Conservative initiation of Dysphagia I pureed solids w/honey thick liquids2. ALL PO VIA TSP ONLY (NO CUP)3. Meds crushed in puree, as able4. 1:1 assist for all meals5. STRICT aspiration precautions - if demonstrating s/s, d/c PO orders & resume NPO status, pending re-evaluation by this department6. Small bites/sips, slow rate, allow for double swallow in between intake7. Oral care8. ONLY FEED WHEN AWAKE/ALERT9. SLP to follow for re-assess of swallow, as schedule permits     5/4  Bed Mobility: Rolling/Turning:     · Level of [place]	moderate assist (50% patients effort)    Bed Mobility: Sit to Supine:     · Level of [place]	maximum assist (25% patients effort)  · Physical Assist/Nonphysical Assist	2 person assist    Bed Mobility: Supine to Sit:     · Level of [place]	maximum assist (25% patients effort)  · Physical Assist/Nonphysical Assist	2 person assist    Transfer: Sit to Stand:     · Level of [place]	maximum assist (25% patients effort)  · Physical Assist/Nonphysical Assist	2 person assist    Transfer: Stand to Sit:     · Level of [place]	maximum assist (25% patients effort)  · Physical Assist/Nonphysical Assist	2 person assist    Sit/Stand Transfer Safety Analysis:     · Transfer Safety Concerns Noted	unsteadiness throughout, poor balance, verbal cues for sequencing    Gait Skills:     · Level of [place]	unable to perform; safety      VITALS  T(C): 36.3 (05-11-21 @ 11:01), Max: 37.2 (05-10-21 @ 17:00)  HR: 87 (05-11-21 @ 11:01) (87 - 114)  BP: 111/71 (05-11-21 @ 11:01) (101/67 - 130/55)  RR: 19 (05-11-21 @ 11:01) (18 - 19)  SpO2: 96% (05-11-21 @ 04:20) (96% - 100%)  Wt(kg): --    MEDICATIONS   acetaminophen    Suspension .. 650 milliGRAM(s) every 6 hours PRN  aspirin Suppository 300 milliGRAM(s) daily  atorvastatin 40 milliGRAM(s) at bedtime  chlorhexidine 4% Liquid 1 Application(s) <User Schedule>  dextrose 40% Gel 15 Gram(s) once  dextrose 5%. 1000 milliLiter(s) <Continuous>  dextrose 5%. 1000 milliLiter(s) <Continuous>  dextrose 50% Injectable 25 Gram(s) once  dextrose 50% Injectable 12.5 Gram(s) once  epoetin analilia-epbx (RETACRIT) Injectable 69943 Unit(s) <User Schedule>  glucagon  Injectable 1 milliGRAM(s) once  heparin   Injectable 5000 Unit(s) every 8 hours  insulin lispro (ADMELOG) corrective regimen sliding scale   every 6 hours  LORazepam   Injectable 0.25 milliGRAM(s) every 6 hours PRN  metoprolol tartrate 12.5 milliGRAM(s) every 12 hours  midodrine. 2.5 milliGRAM(s) three times a day  Nephro-debbie 1 Tablet(s) daily  pantoprazole  Injectable 40 milliGRAM(s) daily  polyethylene glycol 3350 17 Gram(s) daily  senna 2 Tablet(s) at bedtime  sodium chloride 0.9% lock flush 10 milliLiter(s) every 1 hour PRN  valproate sodium IVPB 500 milliGRAM(s) every 12 hours      RECENT LABS/IMAGING                          9.0    9.61  )-----------( 192      ( 10 May 2021 08:55 )             30.6     05-10    139  |  102  |  45.0<H>  ----------------------------<  126<H>  4.2   |  25.0  |  8.17<H>    Ca    8.8      10 May 2021 08:55  Phos  4.6     05-10  Mg     2.3     05-10    TPro  x   /  Alb  2.8<L>  /  TBili  x   /  DBili  x   /  AST  x   /  ALT  x   /  AlkPhos  x   05-10                  HEAD CT 5/3 - An evolving left-sided PCA distribution infarction is again seen with areas of gyral hyperattenuation. Findings may indicate superimposed petechial hemorrhagic transformation versus cortical laminar necrosis versus a combination of both. No new areas of acute intracranial hemorrhage are seen. Multiple small chronic infarcts are noted within the right cerebellar hemisphere. There is no hydrocephalus. There is diffuse cerebral volume loss with prominence of the sulci, fissures, and cisternal spaces which is normal for the patient's age. There is mild periventricular white matter hypoattenuation statistically compatible with microvascular changes given calcific atherosclerotic disease of the intracranial arteries. The paranasal sinuses and mastoid air cells are clear. The calvarium appears intact. The orbits appear unremarkable apart from cataract removal.    HEAD CT 5/11 - No significant interval change from 5/3/2021.    ----------------------------------------------------------------------------------------  PHYSICAL EXAM  Constitutional - NAD, Appears comfortable  Extremities - Left UE swelling, Bilateral wrist restraints   Neurologic Exam -                    Cognitive - Intermittently Awake/Alert     Communication - Perseverates on "OK thank you"     Cranial Nerves - Right facial droop     Motor - Unable to fully assess - Limited command followin                     LEFT    UE - Noted to flex/lift arm - limited by restraint                     RIGHT UE - Noted to flex/lift arm - limited by restraint                    LEFT    LE - HF 3/5                    RIGHT LE - HF 1/5     Sensory - Unable to fully assess     Coordination - Impaired  Psychiatric - Fatigued/Intermittently restless  ------------------------------------------------------------------------------------------------  ASSESSMENT/PLAN  71yFemale with functional deficits after developing an acute CVA  Left PCA CVA with hemorrhagic conversion - ASA, Lipitor  HTN - Lopressor  HypoTN - Midodrine  CRF - HD  Mood - Recommend DC Ativan, Recommend change Depakote 250mg Q8, Recommend Melatonin 5mg HS  Pain - Tylenol  Oropharyngeal Dysphagia - Please change to Puree/Honey via TSP (as per SLP 5/10)  DVT PPX - SCDs, Heparin  Rehab - Patient medically being optimized. GOC planned with palliative care for potential hospice. If goal is to continue with treatment, continue to recommend [facility], patient DOES NOT meet acute inpatient rehabilitation criteria.     Continue mobilization by staff to maintain cardiopulmonary function and prevention of secondary complications related to debility.     Discussed with rehab team.

## 2021-05-11 NOTE — GOALS OF CARE CONVERSATION - ADVANCED CARE PLANNING - CONVERSATION DETAILS
Palliative care encounter.    Plan:     -Call placed to Kenny (daughter-in-law) to touch base and schedule family meeting   -Extensive family meeting held - Kenny (daughter in law)  (patient's son) Jose Antonio and patient's  Dominic Wayne. Dominic verbally consented to allow his son Jose Antonio and his wife Kenny to make health care decisions for his wife, Javier.   - Discussed along with NP Tali ALMANZA on [address] [place] regarding patient's overall prognosis, diagnosis, treatment options - including HD/need for new access, advancing diet [risk of aspiration], option for conservative management with hospice support at home, and answered all questions/concerns family had.   -At baseline family states "on a good day, her mentation with understanding is 50%, but most days she's near 0" Family expressed understanding that she has had stroke in the past, and this hospitalization, and is at risk for having another stroke.   - At this time family wants to continue to be aggressive with treatment, medically optimize, and continue with HD (including being evaluated by vascular for port access)   -Reconfirmed, in the event of cardiopulmonary arrest, patient to have CPR and INTUBATION - all family members agreed ( Kenny, Jose Antonio, and Dominic)   - Will continue to support and follow     30 minutes on GOC / ACP at bedside with family     Thank you for the opportunity to assist with the care of this patient.   [facility] [phone]. Palliative care encounter.    Plan:     -Call placed to Kenny (daughter-in-law) to touch base and schedule family meeting   -Extensive family meeting held - Kenny (daughter in law) Jose Antonio (patient's son) Jose Antonio and patient's  Dominic Black. Dominic verbally consented to allow his son Jose Antonio and his wife Kenny to make health care decisions for his wife, Javier.   - Discussed along with NP Tali ALMANZA on [address] [place] regarding patient's overall prognosis, diagnosis, treatment options - including HD/need for new access, advancing diet [risk of aspiration], option for conservative management with hospice support at home, and answered all questions/concerns family had.   -At baseline family states "on a good day, her mentation with understanding is 50%, but most days she's near 0" Family expressed understanding that she has had stroke in the past, and this hospitalization, and is at risk for having another stroke.   - At this time family wants to continue to be aggressive with treatment, medically optimize, and continue with HD (including being evaluated by vascular for port access)   -Reconfirmed, in the event of cardiopulmonary arrest, patient to have CPR and INTUBATION - all family members agreed ( Kenny, Jose Antonio, and Dominic)   - Will continue to support and follow     30 minutes on GOC / ACP at bedside with family     Thank you for the opportunity to assist with the care of this patient.   [facility] [phone].

## 2021-05-11 NOTE — PROGRESS NOTE ADULT - ASSESSMENT
72 y/o female with PMHx CAD s/p stents '07, HTN, MI, PAF, liver abscess, s/p biliary stent with removal (11/2018; 7/2/19), chronic pancreatitis, DM2 on insulin, diabetic neuropathy, ESRD (5/2018) on HD (M/W/F since 5/18) presents to [facility] as a transfer from [place] after she went to HD with and had  an episode of syncope and change in mental status. HD RN reported she became unresponsive, L gaze preference, and was perseverating. Patient went for emergent CTH which revealed evolving L posterior cerebral artery infarct with new hemorrhage. Patient was subsequently intubated due to concern for deteriorating. Patient was then transferred to [facility].     Of note, patient was recently admitted to [place] on 4/12/21 for hypoxic/hypercapnic resp failure and hyperkalemia secondary to ESRD. While admitted pt was found to be confused, MRI obtained revealed large acute infarct of the left PCA. Pt was originally on Asprin which was changed to Plavix. Patient was discharged to rehab on Plavix.    Patient brought to NSICU and seen by nephro and continued on HD. Patient was extubated on 5/1. Patient seen by vascular for low flow out of the fistula and had a left femoral placed for HD. Patient downgraded to medical floor.    #Acute metabolic encephalopathy - secondary to multiple cvas in the past month  asa and statin and Depakote,  as per neurosurgery repeat ct head on 5.11 to reassess on when to start AC - Repeat CT Head unchanged.   ng tube replaced multiple times, currently on wrist restraints, S/S recommended advancing to puree with honey.    ammonia, tsh WNL    #CVA - in setting of afib  plan as above    #dm2 - elevated a1c 8.7%  c.w ssi    #afib -   Continue metoprolol 12.5mg BID   no dig as per EP , On AC,    #esrd -  c.w HD  Eventual fistulogram  s/p left ij replaced, patient pulled out 5/10 overnight.  Vascular to hold on replacement until 5/12.    #diet -  nutrition consulted  Will start diet as per speech     prognosis poor to grim   palliative following

## 2021-05-11 NOTE — PROGRESS NOTE ADULT - SUBJECTIVE AND OBJECTIVE BOX
Palliative Followup  OVERNIGHT EVENTS: no acute events - plan for family meeting today 2 pm     CC:cva    Present Symptoms:   Dyspnea: 0   Nausea/Vomiting: Unable to obtain due to poor mentation  Anxiety:  Unable to obtain due to poor mentation  Depression: Unable to obtain due to poor mentation  Fatigue: Yes   Loss of appetite: Unable to obtain due to poor mentation    Pain: Appeared comfortable during assessment            Character-            Duration-            Effect-            Factors-            Frequency-            Location-            Severity-    Review of Systems: Reviewed  Per HPI, all other ROS negative  Unable to obtain due to poor mentation     MEDICATIONS  (STANDING):  aspirin Suppository 300 milliGRAM(s) Rectal daily  atorvastatin 40 milliGRAM(s) Oral at bedtime  chlorhexidine 4% Liquid 1 Application(s) Topical <User Schedule>  dextrose 40% Gel 15 Gram(s) Oral once  dextrose 5%. 1000 milliLiter(s) (50 mL/Hr) IV Continuous <Continuous>  dextrose 5%. 1000 milliLiter(s) (100 mL/Hr) IV Continuous <Continuous>  dextrose 50% Injectable 25 Gram(s) IV Push once  dextrose 50% Injectable 12.5 Gram(s) IV Push once  epoetin analilia-epbx (RETACRIT) Injectable 14869 Unit(s) IV Push <User Schedule>  glucagon  Injectable 1 milliGRAM(s) IntraMuscular once  heparin   Injectable 5000 Unit(s) SubCutaneous every 8 hours  insulin lispro (ADMELOG) corrective regimen sliding scale   SubCutaneous every 6 hours  metoprolol tartrate 12.5 milliGRAM(s) Oral every 12 hours  midodrine. 2.5 milliGRAM(s) Oral three times a day  Nephro-debbie 1 Tablet(s) Oral daily  pantoprazole  Injectable 40 milliGRAM(s) IV Push daily  polyethylene glycol 3350 17 Gram(s) Oral daily  senna 2 Tablet(s) Oral at bedtime  valproate sodium IVPB 500 milliGRAM(s) IV Intermittent every 12 hours    MEDICATIONS  (PRN):  acetaminophen    Suspension .. 650 milliGRAM(s) Oral every 6 hours PRN Temp greater or equal to 38C (100.4F), Mild Pain (1 - 3)  LORazepam   Injectable 0.25 milliGRAM(s) IV Push every 6 hours PRN Agitation  sodium chloride 0.9% lock flush 10 milliLiter(s) IV Push every 1 hour PRN Pre/post blood products, medications, blood draw, and to maintain line patency      Vital Signs Last 24 Hrs  T(C): 36.3 (11 May 2021 11:01), Max: 37.2 (10 May 2021 17:00)  T(F): 97.4 (11 May 2021 11:01), Max: 99 (10 May 2021 17:00)  HR: 87 (11 May 2021 11:01) (87 - 114)  BP: 111/71 (11 May 2021 11:01) (101/67 - 130/55)  BP(mean): --  ABP: --  ABP(mean): --  RR: 19 (11 May 2021 11:01) (18 - 19)  SpO2: 96% (11 May 2021 04:20) (96% - 100%)    General: alert _ confused    Karnofsky:  %__30    HEENT: dry mouth    Lungs: comfortable    CV: tachycardia    GI: incontinent     : incontinent    MSK: weakness  edema generalized     Skin: thin skin, clamy , cool    LABS:   Renal Panel (05.10.21 @ 08:55)    Chloride, Serum: 102 mmol/L    Sodium, Serum: 139 mmol/L    Potassium, Serum: 4.2 mmol/L    Carbon Dioxide, Serum: 25.0 mmol/L    Anion Gap, Serum: 12 mmol/L    Blood Urea Nitrogen, Serum: 45.0 mg/dL    Creatinine, Serum: 8.17 mg/dL    Glucose, Serum: 126 mg/dL    Calcium, Total Serum: 8.8 mg/dL    Albumin, Serum: 2.8 g/dL    eGFR if Non : 4:     eGFR if African American: 5 mL/min/1.73M2    Phosphorus Level, Serum: 4.6 mg/dL    RADIOLOGY & ADDITIONAL STUDIES:  CT brain 05.11.21  FINDINGS:  Similar-appearing subacute left occipitotemporal infarct with similar appearing increased gyral attenuation throughout the region of infarction. Redemonstration of subacute left thalamic infarct.  No hydrocephalus, midline shift, or effacement of the basal cisterns.  Chronic bilateral basal ganglia infarcts. Moderate white matter microvascular ischemic disease.  IMPRESSION:  No significant interval change from 5/3/2021.    CXR 05.10.21  IMPRESSION:  Left IJ line and NG tube in satisfactory position.  Mild, central pulmonary venous congestion, new    CT head 05.03.21  FINDINGS: An evolving left-sided PCA distribution infarction is again seen withareas of gyral hyperattenuation. Findings may indicate superimposed petechial hemorrhagic transformation versus cortical laminar necrosis versus a combination of both.  No new areas of acute intracranial hemorrhage are seen.  Multiple small chronic infarcts are noted within the right cerebellar hemisphere. There is no hydrocephalus.  There is diffuse cerebral volume loss with prominence of the sulci, fissures, and cisternal spaces which is normal for the patient's age. There is mild periventricular white matter hypoattenuation statistically compatible with microvascular changes given calcific atherosclerotic disease of the intracranial arteries.  The paranasal sinuses and mastoid air cells are clear. The calvarium appears intact. The orbits appear unremarkable apart from cataract removal.  IMPRESSION: Stable follow-up CT examination.    ADVANCE DIRECTIVES: FULL CODE

## 2021-05-11 NOTE — PROGRESS NOTE ADULT - SUBJECTIVE AND OBJECTIVE BOX
CC: CVA with hemorrhagic transformation (11 May 2021 12:22)    HPI:  70 y/o female with PMHx CAD s/p stents '07, HTN, MI, PAF, liver abscess, s/p biliary stent with removal (11/2018; 7/2/19), chronic pancreatitis, DM2 on insulin, diabetic neuropathy, ESRD (5/2018) on HD (M/W/F since 5/18) presents to [facility] as a transfer from [place] after she went to HD with and had  an episode of syncope and change in mental status. HD RN reported she became unresponsive, L gaze preference, and was perseverating. Patient went for emergent CTH which revealed evolving L posterior cerebral artery infarct with new hemorrhage. Patient was subsequently intubated due to concern for deteriorating. Patient was then transferred to [facility].     Of note, patient was recently admitted to [place] on 4/12/21 for hypoxic/hypercapnic resp failure and hyperkalemia secondary to ESRD. While admitted pt was found to be confused, MRI obtained revealed large acute infarct of the left PCA. Pt was originally on Asprin which was changed to Plavix. Patient was discharged to rehab on Plavix.    Patient brought to NSICU and seen by nephro and continued on HD. Patient was extubated on 5/1. Patient seen by vascular for low flow out of the fistula and had a left femoral placed for HD. Patient downgraded to medical floor.    INTERVAL HPI/OVERNIGHT EVENTS:  Patient seen and examined lying in bed.  Events overnight noted.  Patient confused and ROS limited.      Vital Signs Last 24 Hrs  T(C): 36.3 (11 May 2021 11:01), Max: 37.2 (10 May 2021 17:00)  T(F): 97.4 (11 May 2021 11:01), Max: 99 (10 May 2021 17:00)  HR: 87 (11 May 2021 11:01) (87 - 114)  BP: 111/71 (11 May 2021 11:01) (101/67 - 130/55)  BP(mean): --  RR: 19 (11 May 2021 11:01) (18 - 19)  SpO2: 96% (11 May 2021 04:20) (96% - 100%)  I&O's Detail    10 May 2021 07:01  -  11 May 2021 07:00  --------------------------------------------------------  IN:  Total IN: 0 mL    OUT:    Other (mL): 0 mL  Total OUT: 0 mL    Total NET: 0 mL        PHYSICAL EXAM:  GENERAL: NAD  HEAD:  Atraumatic, Normocephalic  NECK: Supple, No JVD, Normal thyroid  NERVOUS SYSTEM:  Alert, moving all extremities  CHEST/LUNG: Clear to auscultation bilaterally  HEART: Regular rate and rhythm; No murmurs, rubs, or gallops  ABDOMEN: Soft, Nontender, Nondistended; Bowel sounds present  EXTREMITIES:  2+ Peripheral Pulses, No clubbing, cyanosis, or edema                              9.0    9.61  )-----------( 192      ( 10 May 2021 08:55 )             30.6     10 May 2021 08:55    139    |  102    |  45.0   ----------------------------<  126    4.2     |  25.0   |  8.17     Ca    8.8        10 May 2021 08:55  Phos  4.6       10 May 2021 08:55  Mg     2.3       10 May 2021 08:19    TPro  x      /  Alb  2.8    /  TBili  x      /  DBili  x      /  AST  x      /  ALT  x      /  AlkPhos  x      10 May 2021 08:55      CAPILLARY BLOOD GLUCOSE  POCT Blood Glucose.: 151 mg/dL (11 May 2021 12:23)  POCT Blood Glucose.: 134 mg/dL (11 May 2021 05:36)  POCT Blood Glucose.: 163 mg/dL (10 May 2021 23:47)  POCT Blood Glucose.: 131 mg/dL (10 May 2021 18:16)    LIVER FUNCTIONS - ( 10 May 2021 08:55 )  Alb: 2.8 g/dL / Pro: x     / ALK PHOS: x     / ALT: x     / AST: x     / GGT: x               MEDICATIONS  (STANDING):  aspirin Suppository 300 milliGRAM(s) Rectal daily  atorvastatin 40 milliGRAM(s) Oral at bedtime  chlorhexidine 4% Liquid 1 Application(s) Topical <User Schedule>  dextrose 40% Gel 15 Gram(s) Oral once  dextrose 5%. 1000 milliLiter(s) (50 mL/Hr) IV Continuous <Continuous>  dextrose 5%. 1000 milliLiter(s) (100 mL/Hr) IV Continuous <Continuous>  dextrose 50% Injectable 25 Gram(s) IV Push once  dextrose 50% Injectable 12.5 Gram(s) IV Push once  epoetin analilia-epbx (RETACRIT) Injectable 05245 Unit(s) IV Push <User Schedule>  glucagon  Injectable 1 milliGRAM(s) IntraMuscular once  heparin   Injectable 5000 Unit(s) SubCutaneous every 8 hours  insulin lispro (ADMELOG) corrective regimen sliding scale   SubCutaneous every 6 hours  metoprolol tartrate 12.5 milliGRAM(s) Oral every 12 hours  midodrine. 2.5 milliGRAM(s) Oral three times a day  Nephro-debbie 1 Tablet(s) Oral daily  pantoprazole  Injectable 40 milliGRAM(s) IV Push daily  polyethylene glycol 3350 17 Gram(s) Oral daily  senna 2 Tablet(s) Oral at bedtime  valproate sodium IVPB 250 milliGRAM(s) IV Intermittent every 8 hours    MEDICATIONS  (PRN):  acetaminophen    Suspension .. 650 milliGRAM(s) Oral every 6 hours PRN Temp greater or equal to 38C (100.4F), Mild Pain (1 - 3)  LORazepam   Injectable 0.25 milliGRAM(s) IV Push every 6 hours PRN Agitation  sodium chloride 0.9% lock flush 10 milliLiter(s) IV Push every 1 hour PRN Pre/post blood products, medications, blood draw, and to maintain line patency      RADIOLOGY & ADDITIONAL TESTS:  < from: CT Head No Cont (05.11.21 @ 09:49) >   EXAM:  CT BRAIN                          PROCEDURE DATE:  05/11/2021          INTERPRETATION:  Noncontrast CT of the brain.    CLINICAL INDICATION: Follow-up left occipitotemporal and thalamic infarct    TECHNIQUE : Axial CT scanning of the brain was obtained from the skull base to the vertex without the administration of intravenous contrast. Sagittal and coronal reformats were provided.    COMPARISON: CT brain 5/3/2021    FINDINGS:    Similar-appearing subacute left occipitotemporal infarct with similar appearing increased gyral attenuation throughout the region of infarction. Redemonstration of subacute left thalamic infarct.    No hydrocephalus, midline shift, or effacement of the basal cisterns.    Chronic bilateral basal ganglia infarcts. Moderate white matter microvascular ischemic disease.    IMPRESSION:    No significant interval change from 5/3/2021.                  RYAN PEREZ MD; Attending Radiologist  This document has been electronically signed. May 11 2021 10:05AM    < end of copied text >

## 2021-05-11 NOTE — PROGRESS NOTE ADULT - ASSESSMENT
71y Female who is followed by neurology because of left PCA CVA with heme    Left PCA stroke with hemorrhagic conversion  This event occurred over a week ago.  LDL=39  Continue antiplatelet and statin.   Maintain normotension  History of PAF. Follow up head CT 5/11/2021 stable.  Can start anticoagulation.  Evaluated for Watchman, may be appropriate candidate.  PT/OT  On valproic acid. Will change to 250 mg q 8 h.    Discharge planning.   Seen by rehab.  Eventual subacute rehabilitation.    Case discussed with Dr Pike.

## 2021-05-12 LAB
ALBUMIN SERPL ELPH-MCNC: 2.6 G/DL — LOW (ref 3.3–5.2)
ANION GAP SERPL CALC-SCNC: 16 MMOL/L — SIGNIFICANT CHANGE UP (ref 5–17)
BUN SERPL-MCNC: 38 MG/DL — HIGH (ref 8–20)
CALCIUM SERPL-MCNC: 8.6 MG/DL — SIGNIFICANT CHANGE UP (ref 8.6–10.2)
CHLORIDE SERPL-SCNC: 101 MMOL/L — SIGNIFICANT CHANGE UP (ref 98–107)
CO2 SERPL-SCNC: 22 MMOL/L — SIGNIFICANT CHANGE UP (ref 22–29)
CREAT SERPL-MCNC: 7.22 MG/DL — HIGH (ref 0.5–1.3)
GLUCOSE BLDC GLUCOMTR-MCNC: 106 MG/DL — HIGH (ref 70–99)
GLUCOSE BLDC GLUCOMTR-MCNC: 109 MG/DL — HIGH (ref 70–99)
GLUCOSE BLDC GLUCOMTR-MCNC: 126 MG/DL — HIGH (ref 70–99)
GLUCOSE BLDC GLUCOMTR-MCNC: 95 MG/DL — SIGNIFICANT CHANGE UP (ref 70–99)
GLUCOSE SERPL-MCNC: 132 MG/DL — HIGH (ref 70–99)
HCT VFR BLD CALC: 32.8 % — LOW (ref 34.5–45)
HGB BLD-MCNC: 9.4 G/DL — LOW (ref 11.5–15.5)
MCHC RBC-ENTMCNC: 28.7 GM/DL — LOW (ref 32–36)
MCHC RBC-ENTMCNC: 29.6 PG — SIGNIFICANT CHANGE UP (ref 27–34)
MCV RBC AUTO: 103.1 FL — HIGH (ref 80–100)
PHOSPHATE SERPL-MCNC: 4.8 MG/DL — HIGH (ref 2.4–4.7)
PLATELET # BLD AUTO: 235 K/UL — SIGNIFICANT CHANGE UP (ref 150–400)
POTASSIUM SERPL-MCNC: 3.8 MMOL/L — SIGNIFICANT CHANGE UP (ref 3.5–5.3)
POTASSIUM SERPL-SCNC: 3.8 MMOL/L — SIGNIFICANT CHANGE UP (ref 3.5–5.3)
RBC # BLD: 3.18 M/UL — LOW (ref 3.8–5.2)
RBC # FLD: 18 % — HIGH (ref 10.3–14.5)
SODIUM SERPL-SCNC: 139 MMOL/L — SIGNIFICANT CHANGE UP (ref 135–145)
WBC # BLD: 7.68 K/UL — SIGNIFICANT CHANGE UP (ref 3.8–10.5)
WBC # FLD AUTO: 7.68 K/UL — SIGNIFICANT CHANGE UP (ref 3.8–10.5)

## 2021-05-12 PROCEDURE — 71045 X-RAY EXAM CHEST 1 VIEW: CPT | Mod: 26

## 2021-05-12 PROCEDURE — 99232 SBSQ HOSP IP/OBS MODERATE 35: CPT

## 2021-05-12 PROCEDURE — 76937 US GUIDE VASCULAR ACCESS: CPT | Mod: 26

## 2021-05-12 PROCEDURE — 36556 INSERT NON-TUNNEL CV CATH: CPT

## 2021-05-12 PROCEDURE — 99233 SBSQ HOSP IP/OBS HIGH 50: CPT

## 2021-05-12 PROCEDURE — 90937 HEMODIALYSIS REPEATED EVAL: CPT

## 2021-05-12 RX ORDER — SODIUM CHLORIDE 9 MG/ML
10 INJECTION INTRAMUSCULAR; INTRAVENOUS; SUBCUTANEOUS
Refills: 0 | Status: DISCONTINUED | OUTPATIENT
Start: 2021-05-12 | End: 2021-05-23

## 2021-05-12 RX ORDER — LANOLIN ALCOHOL/MO/W.PET/CERES
5 CREAM (GRAM) TOPICAL AT BEDTIME
Refills: 0 | Status: DISCONTINUED | OUTPATIENT
Start: 2021-05-12 | End: 2021-05-23

## 2021-05-12 RX ORDER — ENOXAPARIN SODIUM 100 MG/ML
90 INJECTION SUBCUTANEOUS DAILY
Refills: 0 | Status: DISCONTINUED | OUTPATIENT
Start: 2021-05-13 | End: 2021-05-17

## 2021-05-12 RX ORDER — CHLORHEXIDINE GLUCONATE 213 G/1000ML
1 SOLUTION TOPICAL
Refills: 0 | Status: DISCONTINUED | OUTPATIENT
Start: 2021-05-12 | End: 2021-05-23

## 2021-05-12 RX ORDER — ASPIRIN/CALCIUM CARB/MAGNESIUM 324 MG
81 TABLET ORAL DAILY
Refills: 0 | Status: DISCONTINUED | OUTPATIENT
Start: 2021-05-12 | End: 2021-05-23

## 2021-05-12 RX ORDER — ENOXAPARIN SODIUM 100 MG/ML
90 INJECTION SUBCUTANEOUS
Refills: 0 | Status: DISCONTINUED | OUTPATIENT
Start: 2021-05-12 | End: 2021-05-12

## 2021-05-12 RX ADMIN — Medication 650 MILLIGRAM(S): at 11:11

## 2021-05-12 RX ADMIN — Medication 26.25 MILLIGRAM(S): at 05:21

## 2021-05-12 RX ADMIN — POLYETHYLENE GLYCOL 3350 17 GRAM(S): 17 POWDER, FOR SOLUTION ORAL at 11:11

## 2021-05-12 RX ADMIN — Medication 0.25 MILLIGRAM(S): at 23:48

## 2021-05-12 RX ADMIN — Medication 26.25 MILLIGRAM(S): at 17:54

## 2021-05-12 RX ADMIN — Medication 81 MILLIGRAM(S): at 11:11

## 2021-05-12 RX ADMIN — Medication 5 MILLIGRAM(S): at 22:23

## 2021-05-12 RX ADMIN — PANTOPRAZOLE SODIUM 40 MILLIGRAM(S): 20 TABLET, DELAYED RELEASE ORAL at 11:11

## 2021-05-12 RX ADMIN — MIDODRINE HYDROCHLORIDE 2.5 MILLIGRAM(S): 2.5 TABLET ORAL at 11:11

## 2021-05-12 RX ADMIN — Medication 650 MILLIGRAM(S): at 12:34

## 2021-05-12 RX ADMIN — MIDODRINE HYDROCHLORIDE 2.5 MILLIGRAM(S): 2.5 TABLET ORAL at 07:32

## 2021-05-12 RX ADMIN — Medication 1 TABLET(S): at 11:11

## 2021-05-12 RX ADMIN — MIDODRINE HYDROCHLORIDE 2.5 MILLIGRAM(S): 2.5 TABLET ORAL at 16:28

## 2021-05-12 RX ADMIN — CHLORHEXIDINE GLUCONATE 1 APPLICATION(S): 213 SOLUTION TOPICAL at 05:20

## 2021-05-12 RX ADMIN — Medication 26.25 MILLIGRAM(S): at 22:23

## 2021-05-12 RX ADMIN — ERYTHROPOIETIN 10000 UNIT(S): 10000 INJECTION, SOLUTION INTRAVENOUS; SUBCUTANEOUS at 14:40

## 2021-05-12 RX ADMIN — ATORVASTATIN CALCIUM 40 MILLIGRAM(S): 80 TABLET, FILM COATED ORAL at 22:23

## 2021-05-12 RX ADMIN — APIXABAN 5 MILLIGRAM(S): 2.5 TABLET, FILM COATED ORAL at 07:31

## 2021-05-12 RX ADMIN — SENNA PLUS 2 TABLET(S): 8.6 TABLET ORAL at 22:23

## 2021-05-12 NOTE — PROGRESS NOTE ADULT - ASSESSMENT
Patient tolerated HD 3.5 hours with 0.5 liters of fluid removal.     Vital signs were stable during treatment.     Post access care provided.     Report given to a primary RN.

## 2021-05-12 NOTE — PROGRESS NOTE ADULT - SUBJECTIVE AND OBJECTIVE BOX
[facility]                                        Neurology at [place]                                 Steve Gamboa, & Farshad                                  [address]                                        [place], [place], [address]                                             [phone]        CC: Stroke    HPI:   The patient is a 71y Female who presented as transfer from [facility] on 4/28/21 with syncope and AMS at dialysis. She had elevated WBC and sepsis workup was done.  She had event of unresponsiveness, left gaze preference and perseveration  Head CT showed evolving left PCA stroke with heme.  She was intubated for airway protection and transferred to [facility] ([facility] ([facility])    Interim history: remain lethargic    ROS:   Unobtainable due to patient's condition.     MEDICATIONS  (STANDING):  aspirin  chewable 81 milliGRAM(s) Oral daily  atorvastatin 40 milliGRAM(s) Oral at bedtime  chlorhexidine 4% Liquid 1 Application(s) Topical <User Schedule>  chlorhexidine 4% Liquid 1 Application(s) Topical <User Schedule>  dextrose 40% Gel 15 Gram(s) Oral once  dextrose 5%. 1000 milliLiter(s) (50 mL/Hr) IV Continuous <Continuous>  dextrose 5%. 1000 milliLiter(s) (100 mL/Hr) IV Continuous <Continuous>  dextrose 50% Injectable 25 Gram(s) IV Push once  dextrose 50% Injectable 12.5 Gram(s) IV Push once  epoetin analilia-epbx (RETACRIT) Injectable 36094 Unit(s) IV Push <User Schedule>  glucagon  Injectable 1 milliGRAM(s) IntraMuscular once  insulin lispro (ADMELOG) corrective regimen sliding scale   SubCutaneous every 6 hours  melatonin 5 milliGRAM(s) Oral at bedtime  metoprolol tartrate 12.5 milliGRAM(s) Oral every 12 hours  midodrine. 2.5 milliGRAM(s) Oral three times a day  Nephro-debbie 1 Tablet(s) Oral daily  pantoprazole  Injectable 40 milliGRAM(s) IV Push daily  polyethylene glycol 3350 17 Gram(s) Oral daily  senna 2 Tablet(s) Oral at bedtime  valproate sodium IVPB 250 milliGRAM(s) IV Intermittent every 8 hours    MEDICATIONS  (PRN):  acetaminophen    Suspension .. 650 milliGRAM(s) Oral every 6 hours PRN Temp greater or equal to 38C (100.4F), Mild Pain (1 - 3)  LORazepam   Injectable 0.25 milliGRAM(s) IV Push every 6 hours PRN Agitation  sodium chloride 0.9% lock flush 10 milliLiter(s) IV Push every 1 hour PRN Pre/post blood products, medications, blood draw, and to maintain line patency  sodium chloride 0.9% lock flush 10 milliLiter(s) IV Push every 1 hour PRN Pre/post blood products, medications, blood draw, and to maintain line patency      Vital Signs Last 24 Hrs  T(C): 36.6 (12 May 2021 13:15), Max: 37.1 (11 May 2021 17:48)  T(F): 97.8 (12 May 2021 13:15), Max: 98.7 (11 May 2021 17:48)  HR: 74 (12 May 2021 13:15) (74 - 108)  BP: 119/45 (12 May 2021 13:15) (98/57 - 119/45)  BP(mean): --  RR: 18 (12 May 2021 13:15) (18 - 18)  SpO2: 100% (12 May 2021 13:15) (97% - 100%)    Detailed Neurologic Exam:    Mental status: Lethargic. not opening eyes to voice/stimuli. Not following instructions.    Cranial nerves: Pupils equal and react symmetrically to light. There is no blink to threat on right.  Extraocular motion is full. There is no ptosis. Facial sensation is intact. Facial musculature is symmetric.    Motor: There is normal bulk and tone.  There is no tremor.  moves right less than left to stimuli    Sensation: grimace to light pinch b/l    Reflexes: muted throughout and plantar responses are equivocal.    Cerebellar: unable to assess dysmetria on finger to nose testing.    Labs:                           9.4    7.68  )-----------( 235      ( 12 May 2021 11:10 )             32.8     05-12    139  |  101  |  38.0<H>  ----------------------------<  132<H>  3.8   |  22.0  |  7.22<H>    Ca    8.6      12 May 2021 11:10  Phos  4.8     05-12    TPro  x   /  Alb  2.6<L>  /  TBili  x   /  DBili  x   /  AST  x   /  ALT  x   /  AlkPhos  x   05-12    LIVER FUNCTIONS - ( 12 May 2021 11:10 )  Alb: 2.6 g/dL / Pro: x     / ALK PHOS: x     / ALT: x     / AST: x     / GGT: x             Rad:   CT head images reviewed (and concur with report): There is no change from prior study.   There is subacute left occipitotemporal infarct with increased gyral attenuation.

## 2021-05-12 NOTE — PROGRESS NOTE ADULT - SUBJECTIVE AND OBJECTIVE BOX
Patient seen and examined    I&O's Summary    12 May 2021 07:01  -  13 May 2021 06:53  --------------------------------------------------------  IN: 0 mL / OUT: 500 mL / NET: -500 mL    REVIEW OF SYSTEMS:    CONSTITUTIONAL: No F/C  RESPIRATORY: No cough or SOB  CARDIOVASCULAR: No CP/palpitations,    GASTROINTESTINAL: No abdominal pain , NVD   GENITOURINARY: No UTI sx  NEUROLOGICAL: No headaches/wk/numbness  MUSCULOSKELETAL:  No joint pain/swelling; No LBP  EXTREMITIES : + swelling,    Vital Signs Last 24 Hrs  T(C): 36.7 (13 May 2021 04:00), Max: 37 (12 May 2021 11:00)  T(F): 98 (13 May 2021 04:00), Max: 98.6 (12 May 2021 11:00)  HR: 82 (13 May 2021 04:00) (74 - 116)  BP: 99/56 (13 May 2021 04:00) (98/57 - 119/45)-  RR: 18 (13 May 2021 04:00) (18 - 19)  SpO2: 97% (13 May 2021 04:00) (97% - 100%)    PHYSICAL EXAM:    GENERAL: NAD, Pale, Confused,  EYES:  conjunctiva and sclera clear  NECK: Supple, No JVD/Bruit  NERVOUS SYSTEM:  A/O x3,   CHEST:  CTA ,No rales or rhonchi  HEART:  RRR, No murmurs  ABDOMEN: Soft, NT/ND BS+  EXTREMITIES:  + Edema;  SKIN: No rashes    LABS:                        9.4    7.68  )-----------( 235      ( 12 May 2021 11:10 )             32.8     05-12    139  |  101  |  38.0<H>  ----------------------------<  132<H>  3.8   |  22.0  |  7.22<H>    Ca    8.6      12 May 2021 11:10  Phos  4.8     05-12    TPro  x   /  Alb  2.6<L>  /  TBili  x   /  DBili  x   /  AST  x   /  ALT  x   /  AlkPhos  x   05-12      MEDICATIONS  (STANDING):  acetaminophen    Suspension .. PRN  aspirin  chewable  atorvastatin  chlorhexidine 4% Liquid  chlorhexidine 4% Liquid  dextrose 40% Gel  dextrose 5%.  dextrose 5%.  dextrose 50% Injectable  dextrose 50% Injectable  enoxaparin Injectable  epoetin analilia-epbx (RETACRIT) Injectable  glucagon  Injectable  insulin lispro (ADMELOG) corrective regimen sliding scale  LORazepam   Injectable PRN  melatonin  metoprolol tartrate  midodrine.  Nephro-debbie  pantoprazole  Injectable  polyethylene glycol 3350  senna  sodium chloride 0.9% lock flush PRN  sodium chloride 0.9% lock flush PRN  valproate sodium IVPB    71y Female who is followed by neurology because of left PCA CVA with heme    Left PCA stroke with hemorrhagic conversion    LDL=39 mg.,   On  antiplatelet therapy  and statin.   History of PAF. Follow up head CT 5/11/2021 stable.  On anticoagulation.  Evaluated for Watchman Procedure,  PT/OT  On valproic acid 250 mg q 8 h. Monitor levels,     Eventual subacute rehabilitation when medically stable

## 2021-05-12 NOTE — PROGRESS NOTE ADULT - ASSESSMENT
70 y/o female with PMHx CAD s/p stents '07, HTN, MI, PAF, liver abscess, s/p biliary stent with removal (11/2018; 7/2/19), chronic pancreatitis, DM2 on insulin, diabetic neuropathy, ESRD (5/2018) on HD (M/W/F since 5/18) presents to [facility] as a transfer from [place] after she went to HD with and had  an episode of syncope and change in mental status. HD RN reported she became unresponsive, L gaze preference, and was perseverating. Patient went for emergent CTH which revealed evolving L posterior cerebral artery infarct with new hemorrhage. Patient was subsequently intubated due to concern for deteriorating. Patient was then transferred to [facility].     Of note, patient was recently admitted to [place] on 4/12/21 for hypoxic/hypercapnic resp failure and hyperkalemia secondary to ESRD. While admitted pt was found to be confused, MRI obtained revealed large acute infarct of the left PCA. Pt was originally on Asprin which was changed to Plavix. Patient was discharged to rehab on Plavix.    Patient brought to NSICU and seen by nephro and continued on HD. Patient was extubated on 5/1. Patient seen by vascular for low flow out of the fistula and had a left femoral placed for HD. Patient downgraded to medical floor.      #Acute metabolic encephalopathy - secondary to multiple cvas in the past month  asa and statin and depakote  neurology following  as per neurosurgery repeat ct head on 5.11 to reassess on when to start AC - Repeat CT Head unchanged. Will start AC.  ammonia, tsh WNL    #CVA - in setting of afib  plan as above  neuro following    #dm2 - elevated a1c 8.7  c.w ssi    #afib -   Continue metoprolol 12.5mg BID   no dig as per EP   lovenox renally dosed    #esrd - renal following   c.w HD  vascular following - eventual fistulogram  s/p left ij replaced, patient pulled out 5/10 overnight.  vascular placed another left IJ      prognosis poor to grim   palliative following  - family wants full code, Family meeting had by previous hospitalist

## 2021-05-12 NOTE — PROGRESS NOTE ADULT - ASSESSMENT
72 y/o F w/PMH CAD, DM, ESRD on HD, s/p cerebral hemorrhagic infarct, with LUE AVF occlusion at proximal cephalic outflow was admitted to NeuroICU now more stable on floor. Self-removed SABINE Hogan overnight yesterday. Recent GOC conversation per primary yielded continuing plan for full care including HD. Plan for temporary dialysis catheter placement.      - Placement of temporary dialysis catheter, consented by daughter over phone.    - Fistulogram planning with possible intervention, date TBD  - Recommend 1:1 constant observation after placement of temporary access. Great risk of hemorrhage if self-removes again as now on therapeutic AC.    - Remainder of care per primary.

## 2021-05-12 NOTE — OCCUPATIONAL THERAPY INITIAL EVALUATION ADULT - ADDITIONAL COMMENTS
Pt owns a RW and wheelchair  Pt is a poor historian; social history and information obtained from chart

## 2021-05-12 NOTE — PROVIDER CONTACT NOTE (CRITICAL VALUE NOTIFICATION) - TEST AND RESULT REPORTED:
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 normal respiratory leonor present

## 2021-05-12 NOTE — OCCUPATIONAL THERAPY INITIAL EVALUATION ADULT - LIVES WITH, PROFILE
son and daughter in law, in a private 2 story house with 3-4 steps to enter and 14 steps inside with chairlift to bedroom; pt's family assisted with ADLs prior to hospitalization/children

## 2021-05-12 NOTE — PROGRESS NOTE ADULT - SUBJECTIVE AND OBJECTIVE BOX
HPI/OVERNIGHT EVENTS:  No acute overnight events. Vital signs stable. Patient self-removed shiley overnight yesterday. Will need another temporary HD access.       MEDICATIONS  (STANDING):  aspirin  chewable 81 milliGRAM(s) Oral daily  atorvastatin 40 milliGRAM(s) Oral at bedtime  chlorhexidine 4% Liquid 1 Application(s) Topical <User Schedule>  dextrose 40% Gel 15 Gram(s) Oral once  dextrose 5%. 1000 milliLiter(s) (50 mL/Hr) IV Continuous <Continuous>  dextrose 5%. 1000 milliLiter(s) (100 mL/Hr) IV Continuous <Continuous>  dextrose 50% Injectable 25 Gram(s) IV Push once  dextrose 50% Injectable 12.5 Gram(s) IV Push once  enoxaparin Injectable 90 milliGRAM(s) SubCutaneous two times a day  epoetin analilia-epbx (RETACRIT) Injectable 89835 Unit(s) IV Push <User Schedule>  glucagon  Injectable 1 milliGRAM(s) IntraMuscular once  insulin lispro (ADMELOG) corrective regimen sliding scale   SubCutaneous every 6 hours  melatonin 5 milliGRAM(s) Oral at bedtime  metoprolol tartrate 12.5 milliGRAM(s) Oral every 12 hours  midodrine. 2.5 milliGRAM(s) Oral three times a day  Nephro-debbie 1 Tablet(s) Oral daily  pantoprazole  Injectable 40 milliGRAM(s) IV Push daily  polyethylene glycol 3350 17 Gram(s) Oral daily  senna 2 Tablet(s) Oral at bedtime  valproate sodium IVPB 250 milliGRAM(s) IV Intermittent every 8 hours    MEDICATIONS  (PRN):  acetaminophen    Suspension .. 650 milliGRAM(s) Oral every 6 hours PRN Temp greater or equal to 38C (100.4F), Mild Pain (1 - 3)  LORazepam   Injectable 0.25 milliGRAM(s) IV Push every 6 hours PRN Agitation  sodium chloride 0.9% lock flush 10 milliLiter(s) IV Push every 1 hour PRN Pre/post blood products, medications, blood draw, and to maintain line patency      Vital Signs Last 24 Hrs  T(C): 37.1 (11 May 2021 17:48), Max: 37.1 (11 May 2021 17:48)  T(F): 98.7 (11 May 2021 17:48), Max: 98.7 (11 May 2021 17:48)  HR: 107 (12 May 2021 07:20) (87 - 107)  BP: 98/57 (12 May 2021 07:20) (98/57 - 111/71)  BP(mean): --  RR: 18 (11 May 2021 17:48) (18 - 19)  SpO2: 97% (11 May 2021 17:48) (97% - 97%)    Constitutional: patient resting comfortably in bed, in no acute distress  HEENT:  no active drainage or redness. Former shiley site without active bleeding or drainage.   Respiratory: respirations are unlabored, no accessory muscle use, no conversational dyspnea  Cardiovascular: regular rate & rhythm  Gastrointestinal: Abdomen soft, non-tender, non-distended  Neurological:  no gross sensory / motor / coordination deficits        I&O's Detail      LABS:                        9.0    9.61  )-----------( 192      ( 10 May 2021 08:55 )             30.6     05-10    139  |  102  |  45.0<H>  ----------------------------<  126<H>  4.2   |  25.0  |  8.17<H>    Ca    8.8      10 May 2021 08:55  Phos  4.6     05-10    TPro  x   /  Alb  2.8<L>  /  TBili  x   /  DBili  x   /  AST  x   /  ALT  x   /  AlkPhos  x   05-10

## 2021-05-12 NOTE — PROGRESS NOTE ADULT - SUBJECTIVE AND OBJECTIVE BOX
Patient participation limited.  Pulls covers back up.  Noted to have tremors in movement.   Some sounds, no verbal output, opens eyes briefly.  Has PT planned today.    REVIEW OF SYSTEMS  Constitutional - No fever,  +fatigue  Neurological - +loss of strength, +tremors    FUNCTIONAL PROGRESS  5/12  Progress Summary: Chart reviewed. Attempted to see pt for swallow reassess. Pt is currently with MD at bedside for procedure. Will continue to follow to complete swallow reassess as schedule permits. RN aware.     5/4  Bed Mobility: Rolling/Turning:     · Level of [place]	moderate assist (50% patients effort)    Bed Mobility: Sit to Supine:     · Level of [place]	maximum assist (25% patients effort)  · Physical Assist/Nonphysical Assist	2 person assist    Bed Mobility: Supine to Sit:     · Level of [place]	maximum assist (25% patients effort)  · Physical Assist/Nonphysical Assist	2 person assist    Transfer: Sit to Stand:     · Level of [place]	maximum assist (25% patients effort)  · Physical Assist/Nonphysical Assist	2 person assist    Transfer: Stand to Sit:     · Level of [place]	maximum assist (25% patients effort)  · Physical Assist/Nonphysical Assist	2 person assist    Sit/Stand Transfer Safety Analysis:     · Transfer Safety Concerns Noted	unsteadiness throughout, poor balance, verbal cues for sequencing    Gait Skills:     · Level of [place]	unable to perform; safety      VITALS  T(C): 37.1 (05-11-21 @ 17:48), Max: 37.1 (05-11-21 @ 17:48)  HR: 107 (05-12-21 @ 07:20) (87 - 107)  BP: 98/57 (05-12-21 @ 07:20) (98/57 - 111/71)  RR: 18 (05-11-21 @ 17:48) (18 - 19)  SpO2: 97% (05-11-21 @ 17:48) (97% - 97%)  Wt(kg): --    MEDICATIONS   acetaminophen    Suspension .. 650 milliGRAM(s) every 6 hours PRN  aspirin  chewable 81 milliGRAM(s) daily  atorvastatin 40 milliGRAM(s) at bedtime  chlorhexidine 4% Liquid 1 Application(s) <User Schedule>  dextrose 40% Gel 15 Gram(s) once  dextrose 5%. 1000 milliLiter(s) <Continuous>  dextrose 5%. 1000 milliLiter(s) <Continuous>  dextrose 50% Injectable 25 Gram(s) once  dextrose 50% Injectable 12.5 Gram(s) once  epoetin analilia-epbx (RETACRIT) Injectable 82509 Unit(s) <User Schedule>  glucagon  Injectable 1 milliGRAM(s) once  insulin lispro (ADMELOG) corrective regimen sliding scale   every 6 hours  LORazepam   Injectable 0.25 milliGRAM(s) every 6 hours PRN  melatonin 5 milliGRAM(s) at bedtime  metoprolol tartrate 12.5 milliGRAM(s) every 12 hours  midodrine. 2.5 milliGRAM(s) three times a day  Nephro-debbie 1 Tablet(s) daily  pantoprazole  Injectable 40 milliGRAM(s) daily  polyethylene glycol 3350 17 Gram(s) daily  senna 2 Tablet(s) at bedtime  sodium chloride 0.9% lock flush 10 milliLiter(s) every 1 hour PRN  valproate sodium IVPB 250 milliGRAM(s) every 8 hours      RECENT LABS/IMAGING         9.0    9.61  )-----------( 192      ( 10 May 2021 08:55 )             30.6     05-10    139  |  102  |  45.0<H>  ----------------------------<  126<H>  4.2   |  25.0  |  8.17<H>    Ca    8.8      10 May 2021 08:55  Phos  4.6     05-10  Mg     2.3     05-10    TPro  x   /  Alb  2.8<L>  /  TBili  x   /  DBili  x   /  AST  x   /  ALT  x   /  AlkPhos  x   05-10                  HEAD CT 5/3 - An evolving left-sided PCA distribution infarction is again seen with areas of gyral hyperattenuation. Findings may indicate superimposed petechial hemorrhagic transformation versus cortical laminar necrosis versus a combination of both. No new areas of acute intracranial hemorrhage are seen. Multiple small chronic infarcts are noted within the right cerebellar hemisphere. There is no hydrocephalus. There is diffuse cerebral volume loss with prominence of the sulci, fissures, and cisternal spaces which is normal for the patient's age. There is mild periventricular white matter hypoattenuation statistically compatible with microvascular changes given calcific atherosclerotic disease of the intracranial arteries. The paranasal sinuses and mastoid air cells are clear. The calvarium appears intact. The orbits appear unremarkable apart from cataract removal.    HEAD CT 5/11 - No significant interval change from 5/3/2021.    ----------------------------------------------------------------------------------------  PHYSICAL EXAM  Constitutional - NAD, Appears comfortable  Extremities - Left UE swelling, Bilateral wrist restraints   Neurologic Exam -                    Cognitive - Intermittently Awake/Alert     Communication - No verbal output, Nods with intermittent moaning     Cranial Nerves - Right facial droop     Motor - Unable to fully assess - Limited command following                     LEFT    UE - EF 1/5                    RIGHT UE - EF 3/5,  4/5                    LEFT    LE - HF 3/5                    RIGHT LE - HF 1/5     Sensory - Unable to fully assess     Coordination - Impaired  Psychiatric - Fatigued/Intermittently restless  ------------------------------------------------------------------------------------------------  ASSESSMENT/PLAN  71yFemale with functional deficits after developing an acute CVA  Left PCA CVA with hemorrhagic conversion - ASA, Lipitor  HTN - Lopressor  HypoTN - Midodrine  CRF - HD  Mood - Recommend DC Ativan, Depakote 250mg Q8 (5/11), Melatonin 5mg HS (5/12)  Pain - Tylenol  Oropharyngeal Dysphagia - Puree/Honey via TSP    DVT PPX - SCDs, Lovenox  Rehab - Family wants to continue aggressive treatment. Given patient's functional status and ability to participate, recommend LTCF, as do not foresee patient able to participate in rehab program at a [facility] level. PT pending today.     Continue mobilization by staff to maintain cardiopulmonary function and prevention of secondary complications related to debility.     Discussed with rehab team.

## 2021-05-12 NOTE — PROGRESS NOTE ADULT - SUBJECTIVE AND OBJECTIVE BOX
Vital Signs Last 24 Hrs,    T(C): 37.1 (11 May 2021 17:48), Max: 37.1 (11 May 2021 17:48)  T(F): 98.7 (11 May 2021 17:48), Max: 98.7 (11 May 2021 17:48)  HR: 107 (12 May 2021 07:20) (87 - 107)  BP: 98/57 (12 May 2021 07:20) (98/57 - 111/71)  RR: 18 (11 May 2021 17:48) (18 - 19)  SpO2: 97% (11 May 2021 17:48) (97% - 97%)    Alb  2.8<L>  10 May 2021 08:55    Phos: 4.6 mg/dL (05-10 @ 08:55)    HD Today,    NTDC Insertion - P    PTA of AVF in a few Days

## 2021-05-12 NOTE — PROGRESS NOTE ADULT - ASSESSMENT
71y Female who is followed by neurology because of left PCA CVA with heme    Left PCA stroke with hemorrhagic conversion    LDL=39  Continue antiplatelet and statin.   Maintain normotension  History of PAF. Follow up head CT 5/11/2021 stable.  Can start anticoagulation.  Evaluated for Watchman, may be appropriate candidate.  PT/OT  On valproic acid 250 mg q 8 h.    Discharge planning.   Seen by rehab.  Eventual subacute rehabilitation when medically stable    Thank you for allowing me to participate in the care of your patient    Kenny Wilson MD, PhD   [id number]

## 2021-05-13 LAB
ALBUMIN SERPL ELPH-MCNC: 2.9 G/DL — LOW (ref 3.3–5.2)
ALP SERPL-CCNC: 68 U/L — SIGNIFICANT CHANGE UP (ref 40–120)
ALT FLD-CCNC: 7 U/L — SIGNIFICANT CHANGE UP
ANION GAP SERPL CALC-SCNC: 15 MMOL/L — SIGNIFICANT CHANGE UP (ref 5–17)
ANISOCYTOSIS BLD QL: SLIGHT — SIGNIFICANT CHANGE UP
AST SERPL-CCNC: 19 U/L — SIGNIFICANT CHANGE UP
BASOPHILS # BLD AUTO: 0 K/UL — SIGNIFICANT CHANGE UP (ref 0–0.2)
BASOPHILS NFR BLD AUTO: 0 % — SIGNIFICANT CHANGE UP (ref 0–2)
BILIRUB SERPL-MCNC: 0.4 MG/DL — SIGNIFICANT CHANGE UP (ref 0.4–2)
BUN SERPL-MCNC: 20 MG/DL — SIGNIFICANT CHANGE UP (ref 8–20)
CALCIUM SERPL-MCNC: 8.5 MG/DL — LOW (ref 8.6–10.2)
CHLORIDE SERPL-SCNC: 99 MMOL/L — SIGNIFICANT CHANGE UP (ref 98–107)
CO2 SERPL-SCNC: 25 MMOL/L — SIGNIFICANT CHANGE UP (ref 22–29)
CREAT SERPL-MCNC: 4.43 MG/DL — HIGH (ref 0.5–1.3)
EOSINOPHIL # BLD AUTO: 0 K/UL — SIGNIFICANT CHANGE UP (ref 0–0.5)
EOSINOPHIL NFR BLD AUTO: 0 % — SIGNIFICANT CHANGE UP (ref 0–6)
GIANT PLATELETS BLD QL SMEAR: PRESENT — SIGNIFICANT CHANGE UP
GLUCOSE BLDC GLUCOMTR-MCNC: 112 MG/DL — HIGH (ref 70–99)
GLUCOSE BLDC GLUCOMTR-MCNC: 128 MG/DL — HIGH (ref 70–99)
GLUCOSE BLDC GLUCOMTR-MCNC: 138 MG/DL — HIGH (ref 70–99)
GLUCOSE SERPL-MCNC: 114 MG/DL — HIGH (ref 70–99)
HCT VFR BLD CALC: 32.7 % — LOW (ref 34.5–45)
HGB BLD-MCNC: 9.6 G/DL — LOW (ref 11.5–15.5)
HYPOCHROMIA BLD QL: SLIGHT — SIGNIFICANT CHANGE UP
LYMPHOCYTES # BLD AUTO: 1.82 K/UL — SIGNIFICANT CHANGE UP (ref 1–3.3)
LYMPHOCYTES # BLD AUTO: 15.8 % — SIGNIFICANT CHANGE UP (ref 13–44)
MACROCYTES BLD QL: SLIGHT — SIGNIFICANT CHANGE UP
MAGNESIUM SERPL-MCNC: 2.2 MG/DL — SIGNIFICANT CHANGE UP (ref 1.6–2.6)
MANUAL SMEAR VERIFICATION: SIGNIFICANT CHANGE UP
MCHC RBC-ENTMCNC: 29.4 GM/DL — LOW (ref 32–36)
MCHC RBC-ENTMCNC: 30 PG — SIGNIFICANT CHANGE UP (ref 27–34)
MCV RBC AUTO: 102.2 FL — HIGH (ref 80–100)
METAMYELOCYTES # FLD: 2.6 % — HIGH (ref 0–0)
MONOCYTES # BLD AUTO: 0.81 K/UL — SIGNIFICANT CHANGE UP (ref 0–0.9)
MONOCYTES NFR BLD AUTO: 7 % — SIGNIFICANT CHANGE UP (ref 2–14)
MYELOCYTES NFR BLD: 2.6 % — HIGH (ref 0–0)
NEUTROPHILS # BLD AUTO: 8.29 K/UL — HIGH (ref 1.8–7.4)
NEUTROPHILS NFR BLD AUTO: 70.2 % — SIGNIFICANT CHANGE UP (ref 43–77)
NEUTS BAND # BLD: 1.8 % — SIGNIFICANT CHANGE UP (ref 0–8)
NRBC # BLD: 1 /100 — HIGH (ref 0–0)
OVALOCYTES BLD QL SMEAR: SLIGHT — SIGNIFICANT CHANGE UP
PLAT MORPH BLD: NORMAL — SIGNIFICANT CHANGE UP
PLATELET # BLD AUTO: 226 K/UL — SIGNIFICANT CHANGE UP (ref 150–400)
POIKILOCYTOSIS BLD QL AUTO: SLIGHT — SIGNIFICANT CHANGE UP
POLYCHROMASIA BLD QL SMEAR: SLIGHT — SIGNIFICANT CHANGE UP
POTASSIUM SERPL-MCNC: 4 MMOL/L — SIGNIFICANT CHANGE UP (ref 3.5–5.3)
POTASSIUM SERPL-SCNC: 4 MMOL/L — SIGNIFICANT CHANGE UP (ref 3.5–5.3)
PROT SERPL-MCNC: 6.6 G/DL — SIGNIFICANT CHANGE UP (ref 6.6–8.7)
RBC # BLD: 3.2 M/UL — LOW (ref 3.8–5.2)
RBC # FLD: 18.6 % — HIGH (ref 10.3–14.5)
RBC BLD AUTO: ABNORMAL
SMUDGE CELLS # BLD: PRESENT — SIGNIFICANT CHANGE UP
SODIUM SERPL-SCNC: 138 MMOL/L — SIGNIFICANT CHANGE UP (ref 135–145)
WBC # BLD: 11.52 K/UL — HIGH (ref 3.8–10.5)
WBC # FLD AUTO: 11.52 K/UL — HIGH (ref 3.8–10.5)

## 2021-05-13 PROCEDURE — 99233 SBSQ HOSP IP/OBS HIGH 50: CPT

## 2021-05-13 PROCEDURE — 99232 SBSQ HOSP IP/OBS MODERATE 35: CPT

## 2021-05-13 RX ORDER — MIDODRINE HYDROCHLORIDE 2.5 MG/1
5 TABLET ORAL THREE TIMES A DAY
Refills: 0 | Status: DISCONTINUED | OUTPATIENT
Start: 2021-05-13 | End: 2021-05-15

## 2021-05-13 RX ADMIN — CHLORHEXIDINE GLUCONATE 1 APPLICATION(S): 213 SOLUTION TOPICAL at 06:23

## 2021-05-13 RX ADMIN — Medication 26.25 MILLIGRAM(S): at 06:22

## 2021-05-13 RX ADMIN — ENOXAPARIN SODIUM 90 MILLIGRAM(S): 100 INJECTION SUBCUTANEOUS at 17:28

## 2021-05-13 RX ADMIN — POLYETHYLENE GLYCOL 3350 17 GRAM(S): 17 POWDER, FOR SOLUTION ORAL at 17:29

## 2021-05-13 RX ADMIN — Medication 26.25 MILLIGRAM(S): at 22:47

## 2021-05-13 RX ADMIN — MIDODRINE HYDROCHLORIDE 5 MILLIGRAM(S): 2.5 TABLET ORAL at 17:28

## 2021-05-13 RX ADMIN — MIDODRINE HYDROCHLORIDE 2.5 MILLIGRAM(S): 2.5 TABLET ORAL at 06:22

## 2021-05-13 RX ADMIN — Medication 81 MILLIGRAM(S): at 17:29

## 2021-05-13 RX ADMIN — PANTOPRAZOLE SODIUM 40 MILLIGRAM(S): 20 TABLET, DELAYED RELEASE ORAL at 17:29

## 2021-05-13 RX ADMIN — Medication 26.25 MILLIGRAM(S): at 17:30

## 2021-05-13 NOTE — PROGRESS NOTE ADULT - ASSESSMENT
72 y/o female with PMHx CAD s/p stents '07, HTN, MI, PAF, liver abscess, s/p biliary stent with removal (11/2018; 7/2/19), chronic pancreatitis, DM2 on insulin, diabetic neuropathy, ESRD (5/2018) on HD (M/W/F since 5/18) presents to [facility] as a transfer from [place] after she went to HD with and had  an episode of syncope and change in mental status. HD RN reported she became unresponsive, L gaze preference, and was perseverating. Patient went for emergent CTH which revealed evolving L posterior cerebral artery infarct with new hemorrhage. Patient was subsequently intubated due to concern for deteriorating. Patient was then transferred to [facility].     Of note, patient was recently admitted to [place] on 4/12/21 for hypoxic/hypercapnic resp failure and hyperkalemia secondary to ESRD. While admitted pt was found to be confused, MRI obtained revealed large acute infarct of the left PCA. Pt was originally on Asprin which was changed to Plavix. Patient was discharged to rehab on Plavix.    Patient brought to NSICU and seen by nephro and continued on HD. Patient was extubated on 5/1. Patient seen by vascular for low flow out of the fistula and had a left femoral placed for HD. Patient downgraded to medical floor. Patients course complicated by multiple periods of pulling IV lines and the original left IJ. Family meeting attempted and family wants full code.       #Acute metabolic encephalopathy - secondary to multiple cvas in the past month  asa and statin and depakote  neurology following  as per neurosurgery repeat ct head on 5.11 to reassess cva - stable   ammonia, tsh WNL  - supportive care    #CVA - in setting of afib  - lovenox renally dosed started    #dm2 - elevated a1c 8.7  c.w ssi    #afib -   Continue metoprolol 12.5mg BID   no dig as per EP   lovenox renally dosed    #esrd - renal following   c.w HD  vascular following - eventual fistulogram  s/p left ij replaced, patient pulled out 5/10 overnight.  vascular placed another left IJ on 5.12      prognosis poor   palliative following  - family wants full code, Family meeting had by previous hospitalist

## 2021-05-13 NOTE — PROGRESS NOTE ADULT - SUBJECTIVE AND OBJECTIVE BOX
Patient more awake and is calm.  Speaking more, but unable to follow commands even with cues.  Continues to be restrained.     REVIEW OF SYSTEMS  Constitutional - No fever,  No fatigue  Neurological - +loss of strength     FUNCTIONAL PROGRESS  5/12  Bed Mobility: Sit to Supine:     · Level of [place]	unable to perform    Bed Mobility: Supine to Sit:     · Level of [place]	unable to perform; pt resistant to mobility due to confusion    Transfer: Bed to Chair:    Bed to Chair Transfer:    Transfer Skill: Bed to Chair   · Level of [place]	unable to perform    Transfer: Sit to Stand:     · [place] of [place]	unable to perform    Transfer: Stand to Sit:     · Level of [place]	unable to perform    Transfer: Toilet Transfer:     · Level of [place]	unable to perform  Bathing Training:     · Level of [place]	dependent (less than 25% patients effort)    Upper Body Dressing Training:     · Level of [place]	dependent (less than 25% patients effort); secondary to confusion    Lower Body Dressing Training:     · Level of [place]	dependent (less than 25% patients effort)    Toilet Hygiene Training:     · Level of [place]	dependent (less than 25% patients effort); secondary to prima fit    Grooming Training:     · Level of [place]	maximum assist (25% patients effort)    Eating/Self-Feeding Training:     · Level of [place]	maximum assist (25% patients effort)      VITALS  T(C): 36.7 (05-13-21 @ 04:00), Max: 37 (05-12-21 @ 11:00)  HR: 82 (05-13-21 @ 04:00) (74 - 116)  BP: 99/56 (05-13-21 @ 04:00) (99/54 - 119/45)  RR: 18 (05-13-21 @ 04:00) (18 - 19)  SpO2: 97% (05-13-21 @ 04:00) (97% - 100%)  Wt(kg): --    MEDICATIONS   acetaminophen    Suspension .. 650 milliGRAM(s) every 6 hours PRN  aspirin  chewable 81 milliGRAM(s) daily  atorvastatin 40 milliGRAM(s) at bedtime  chlorhexidine 4% Liquid 1 Application(s) <User Schedule>  chlorhexidine 4% Liquid 1 Application(s) <User Schedule>  dextrose 40% Gel 15 Gram(s) once  dextrose 5%. 1000 milliLiter(s) <Continuous>  dextrose 5%. 1000 milliLiter(s) <Continuous>  dextrose 50% Injectable 25 Gram(s) once  dextrose 50% Injectable 12.5 Gram(s) once  enoxaparin Injectable 90 milliGRAM(s) daily  epoetin analilia-epbx (RETACRIT) Injectable 85391 Unit(s) <User Schedule>  glucagon  Injectable 1 milliGRAM(s) once  insulin lispro (ADMELOG) corrective regimen sliding scale   every 6 hours  LORazepam   Injectable 0.25 milliGRAM(s) every 6 hours PRN  melatonin 5 milliGRAM(s) at bedtime  metoprolol tartrate 12.5 milliGRAM(s) every 12 hours  midodrine. 5 milliGRAM(s) three times a day  Nephro-debbie 1 Tablet(s) daily  pantoprazole  Injectable 40 milliGRAM(s) daily  polyethylene glycol 3350 17 Gram(s) daily  senna 2 Tablet(s) at bedtime  sodium chloride 0.9% lock flush 10 milliLiter(s) every 1 hour PRN  sodium chloride 0.9% lock flush 10 milliLiter(s) every 1 hour PRN  valproate sodium IVPB 250 milliGRAM(s) every 8 hours      RECENT LABS/IMAGING                          9.6    11.52 )-----------( 226      ( 13 May 2021 07:51 )             32.7     05-13    138  |  99  |  20.0  ----------------------------<  114<H>  4.0   |  25.0  |  4.43<H>    Ca    8.5<L>      13 May 2021 07:51  Phos  4.8     05-12  Mg     2.2     05-13    TPro  6.6  /  Alb  2.9<L>  /  TBili  0.4  /  DBili  x   /  AST  19  /  ALT  7   /  AlkPhos  68  05-13        HEAD CT 5/3 - An evolving left-sided PCA distribution infarction is again seen with areas of gyral hyperattenuation. Findings may indicate superimposed petechial hemorrhagic transformation versus cortical laminar necrosis versus a combination of both. No new areas of acute intracranial hemorrhage are seen. Multiple small chronic infarcts are noted within the right cerebellar hemisphere. There is no hydrocephalus. There is diffuse cerebral volume loss with prominence of the sulci, fissures, and cisternal spaces which is normal for the patient's age. There is mild periventricular white matter hypoattenuation statistically compatible with microvascular changes given calcific atherosclerotic disease of the intracranial arteries. The paranasal sinuses and mastoid air cells are clear. The calvarium appears intact. The orbits appear unremarkable apart from cataract removal.    HEAD CT 5/11 - No significant interval change from 5/3/2021.    CXR 5/12 - Left IJ line with the tip in the right atrium and no pneumothorax, new. Mild, central pulmonary venous congestion, grossly unchanged  ----------------------------------------------------------------------------------------  PHYSICAL EXAM  Constitutional - NAD, Appears comfortable  Extremities - Left UE swelling, Bilateral wrist restraints   Neurologic Exam -                    Cognitive - Awake/Alert     Communication - Some verbal output, but incomprehensible     Cranial Nerves - Right facial droop     Motor - Unable to fully assess                     LEFT    UE - EF 1/5                    RIGHT UE - EF 3/5,  4/5                    LEFT    LE - HF 3/5                    RIGHT LE - HF 1/5     Coordination - Impaired  Psychiatric - Calm  ------------------------------------------------------------------------------------------------  ASSESSMENT/PLAN  71yFemale with functional deficits after developing an acute CVA  Left PCA CVA with hemorrhagic conversion - ASA, Lipitor  HTN - Lopressor  HypoTN - Midodrine  CRF - HD  Mood/Delirium - Recommend DC Ativan, Recommend DC Depakote, Recommend Seroquel 25mg Q8, Melatonin 5mg HS (5/12)  Pain - Tylenol  Oropharyngeal Dysphagia - Puree/Honey via TSP    DVT PPX - SCDs, Lovenox  Rehab - Family wants to continue aggressive treatment. Given patient's functional status and ability to participate, recommend LTCF.     Continue mobilization by staff to maintain cardiopulmonary function and prevention of secondary complications related to debility.     Discussed with rehab team.

## 2021-05-13 NOTE — CHART NOTE - NSCHARTNOTEFT_GEN_A_CORE
Palliative Care NP Sign Off Note    Lengthy family meeting held - as well as prior conversations on GOC and code status - Family remains with goals of aggressive treatment and medical optimization including HD. See GOC note documented on 5-11-21 for additional details    GOC have been established, no further needs to be addressed from Palliative care perspective.  Will sign off at this time. Please reconsult if GOC change or condition decompensates requiring further palliative care assistance.    HCP/Surrogate: Kenny (daughter in law)Jose Antonio Marquez (patient's son) phone number: [phone] (cell) [phone] (home)  Code Status:     FULL CODE - CPR and INTUBATION                                                           Thank you for the opportunity to assist with the care of this patient.   [facility] Palliative Medicine Consult Service [phone]. Palliative Care NP Sign Off Note    Lengthy family meeting held - as well as prior conversations on GOC and code status - Family remains with goals of aggressive treatment and medical optimization including HD. Family wants CPR and INTUBATION in the event of cardiopulmonary arrest/decline. See GOC note documented on 5-11-21 for additional details    GOC have been established, no further needs to be addressed from Palliative care perspective.  Will sign off at this time. Please reconsult if GOC change or condition decompensates requiring further palliative care assistance.    HCP/Surrogate: Kenny (daughter in law)Jose Antonio Marquez (patient's son) phone number: [phone] (cell) [phone] (home)  Code Status:     FULL CODE - CPR and INTUBATION                                                           Thank you for the opportunity to assist with the care of this patient.   [facility] Palliative Medicine Consult Service [phone].

## 2021-05-13 NOTE — PROGRESS NOTE ADULT - SUBJECTIVE AND OBJECTIVE BOX
Patient seen and examined    I&O's Summary    12 May 2021 07:01  -  13 May 2021 06:53  --------------------------------------------------------  IN: 0 mL / OUT: 500 mL / NET: -500 mL    REVIEW OF SYSTEMS:    CONSTITUTIONAL: No F/C  RESPIRATORY: No cough or SOB  CARDIOVASCULAR: No CP/palpitations,    GASTROINTESTINAL: No abdominal pain , NVD   GENITOURINARY: No UTI sx  NEUROLOGICAL: No headaches/wk/numbness  MUSCULOSKELETAL:  No joint pain/swelling; No LBP  EXTREMITIES : + swelling,    Vital Signs Last 24 Hrs  T(C): 36.7 (13 May 2021 04:00), Max: 37 (12 May 2021 11:00)  T(F): 98 (13 May 2021 04:00), Max: 98.6 (12 May 2021 11:00)  HR: 82 (13 May 2021 04:00) (74 - 116)  BP: 99/56 (13 May 2021 04:00) (98/57 - 119/45)-  RR: 18 (13 May 2021 04:00) (18 - 19)  SpO2: 97% (13 May 2021 04:00) (97% - 100%)    PHYSICAL EXAM:    GENERAL: NAD, Pale, Confused,  EYES:  conjunctiva and sclera clear  NECK: Supple, No JVD/Bruit  NERVOUS SYSTEM:  A/O x3,   CHEST:  CTA ,No rales or rhonchi  HEART:  RRR, No murmurs  ABDOMEN: Soft, NT/ND BS+  EXTREMITIES:  + Edema;  SKIN: No rashes    LABS:                        9.4    7.68  )-----------( 235      ( 12 May 2021 11:10 )             32.8     05-12    139  |  101  |  38.0<H>  ----------------------------<  132<H>  3.8   |  22.0  |  7.22<H>    Ca    8.6      12 May 2021 11:10  Phos  4.8     05-12    TPro  x   /  Alb  2.6<L>  /  TBili  x   /  DBili  x   /  AST  x   /  ALT  x   /  AlkPhos  x   05-12      MEDICATIONS  (STANDING):  acetaminophen    Suspension .. PRN  aspirin  chewable  atorvastatin  chlorhexidine 4% Liquid  chlorhexidine 4% Liquid  dextrose 40% Gel  dextrose 5%.  dextrose 5%.  dextrose 50% Injectable  dextrose 50% Injectable  enoxaparin Injectable  epoetin analilia-epbx (RETACRIT) Injectable  glucagon  Injectable  insulin lispro (ADMELOG) corrective regimen sliding scale  LORazepam   Injectable PRN  melatonin  metoprolol tartrate  midodrine.  Nephro-debbie  pantoprazole  Injectable  polyethylene glycol 3350  senna  sodium chloride 0.9% lock flush PRN  sodium chloride 0.9% lock flush PRN  valproate sodium IVPB    71y Female who is followed by neurology because of left PCA CVA with heme    Left PCA stroke with hemorrhagic conversion    LDL=39 mg.,   On  antiplatelet therapy  and statin.   History of PAF. Follow up head CT 5/11/2021 stable.  On anticoagulation.  Evaluated for Watchman Procedure,  PT/OT  On valproic acid 250 mg q 8 h. Monitor levels,     Eventual subacute rehabilitation when medically stable    Patient tolerated HD 3.5 hours with 0.5 liters of fluid removal.     Vital signs were stable during treatment.     Post access care provided.     Report given to a primary RN.

## 2021-05-13 NOTE — PROGRESS NOTE ADULT - SUBJECTIVE AND OBJECTIVE BOX
IRENE TAYLOR    [id number]    71y      Female    INTERVAL HPI/OVERNIGHT EVENTS: patient being seen for cva. Patient seen at bedside and is more awake.       REVIEW OF SYSTEMS:    unable to obtain secondary to mental status     Vital Signs Last 24 Hrs  T(C): 36.7 (13 May 2021 04:00), Max: 37 (12 May 2021 11:00)  T(F): 98 (13 May 2021 04:00), Max: 98.6 (12 May 2021 11:00)  HR: 82 (13 May 2021 04:00) (74 - 116)  BP: 99/56 (13 May 2021 04:00) (99/54 - 119/45)  BP(mean): --  RR: 18 (13 May 2021 04:00) (18 - 19)  SpO2: 97% (13 May 2021 04:00) (97% - 100%)    PHYSICAL EXAM:    GENERAL: NAD, well-groomed  HEENT: PERRL, +EOMI  NECK: soft, Supple, No JVD,   CHEST/LUNG: Clear to auscultation bilaterally; No wheezing  HEART: S1S2+, Regular rate and rhythm; No murmurs, rubs, or gallops  ABDOMEN: Soft, Nontender, Nondistended; Bowel sounds present  EXTREMITIES:  2+ Peripheral Pulses, No clubbing, cyanosis, or edema  SKIN: No rashes or lesions  NEURO: AAOX3, no focal deficits, no motor r sensory loss  PSYCH: normal mood      LABS:                        9.6    11.52 )-----------( 226      ( 13 May 2021 07:51 )             32.7     05-13    138  |  99  |  20.0  ----------------------------<  114<H>  4.0   |  25.0  |  4.43<H>    Ca    8.5<L>      13 May 2021 07:51  Phos  4.8     05-12  Mg     2.2     05-13    TPro  6.6  /  Alb  2.9<L>  /  TBili  0.4  /  DBili  x   /  AST  19  /  ALT  7   /  AlkPhos  68  05-13        MEDICATIONS  (STANDING):  aspirin  chewable 81 milliGRAM(s) Oral daily  atorvastatin 40 milliGRAM(s) Oral at bedtime  chlorhexidine 4% Liquid 1 Application(s) Topical <User Schedule>  chlorhexidine 4% Liquid 1 Application(s) Topical <User Schedule>  dextrose 40% Gel 15 Gram(s) Oral once  dextrose 5%. 1000 milliLiter(s) (50 mL/Hr) IV Continuous <Continuous>  dextrose 5%. 1000 milliLiter(s) (100 mL/Hr) IV Continuous <Continuous>  dextrose 50% Injectable 25 Gram(s) IV Push once  dextrose 50% Injectable 12.5 Gram(s) IV Push once  enoxaparin Injectable 90 milliGRAM(s) SubCutaneous daily  epoetin analilia-epbx (RETACRIT) Injectable 84406 Unit(s) IV Push <User Schedule>  glucagon  Injectable 1 milliGRAM(s) IntraMuscular once  insulin lispro (ADMELOG) corrective regimen sliding scale   SubCutaneous every 6 hours  melatonin 5 milliGRAM(s) Oral at bedtime  metoprolol tartrate 12.5 milliGRAM(s) Oral every 12 hours  midodrine. 5 milliGRAM(s) Oral three times a day  Nephro-debbie 1 Tablet(s) Oral daily  pantoprazole  Injectable 40 milliGRAM(s) IV Push daily  polyethylene glycol 3350 17 Gram(s) Oral daily  senna 2 Tablet(s) Oral at bedtime  valproate sodium IVPB 250 milliGRAM(s) IV Intermittent every 8 hours    MEDICATIONS  (PRN):  acetaminophen    Suspension .. 650 milliGRAM(s) Oral every 6 hours PRN Temp greater or equal to 38C (100.4F), Mild Pain (1 - 3)  sodium chloride 0.9% lock flush 10 milliLiter(s) IV Push every 1 hour PRN Pre/post blood products, medications, blood draw, and to maintain line patency  sodium chloride 0.9% lock flush 10 milliLiter(s) IV Push every 1 hour PRN Pre/post blood products, medications, blood draw, and to maintain line patency      RADIOLOGY & ADDITIONAL TESTS:   IRENE TAYLOR    [id number]    71y      Female    INTERVAL HPI/OVERNIGHT EVENTS: patient being seen for cva. Patient seen at bedside and is more awake.       REVIEW OF SYSTEMS:    unable to obtain secondary to mental status     Vital Signs Last 24 Hrs  T(C): 36.7 (13 May 2021 04:00), Max: 37 (12 May 2021 11:00)  T(F): 98 (13 May 2021 04:00), Max: 98.6 (12 May 2021 11:00)  HR: 82 (13 May 2021 04:00) (74 - 116)  BP: 99/56 (13 May 2021 04:00) (99/54 - 119/45)  BP(mean): --  RR: 18 (13 May 2021 04:00) (18 - 19)  SpO2: 97% (13 May 2021 04:00) (97% - 100%)    PHYSICAL EXAM:    GENERAL: agitated   HEAD:  Atraumatic, Normocephalic  NECK: Supple, No JVD, Normal thyroid  NERVOUS SYSTEM:  Awake  CHEST/LUNG: Clear to auscultation bilaterally  HEART: Regular rate and rhythm; No murmurs, rubs, or gallops  ABDOMEN: Soft, Nontender, Nondistended; Bowel sounds present  EXTREMITIES:  2+ Peripheral Pulses, No clubbing, cyanosis, or edema    LABS:                        9.6    11.52 )-----------( 226      ( 13 May 2021 07:51 )             32.7     05-13    138  |  99  |  20.0  ----------------------------<  114<H>  4.0   |  25.0  |  4.43<H>    Ca    8.5<L>      13 May 2021 07:51  Phos  4.8     05-12  Mg     2.2     05-13    TPro  6.6  /  Alb  2.9<L>  /  TBili  0.4  /  DBili  x   /  AST  19  /  ALT  7   /  AlkPhos  68  05-13        MEDICATIONS  (STANDING):  aspirin  chewable 81 milliGRAM(s) Oral daily  atorvastatin 40 milliGRAM(s) Oral at bedtime  chlorhexidine 4% Liquid 1 Application(s) Topical <User Schedule>  chlorhexidine 4% Liquid 1 Application(s) Topical <User Schedule>  dextrose 40% Gel 15 Gram(s) Oral once  dextrose 5%. 1000 milliLiter(s) (50 mL/Hr) IV Continuous <Continuous>  dextrose 5%. 1000 milliLiter(s) (100 mL/Hr) IV Continuous <Continuous>  dextrose 50% Injectable 25 Gram(s) IV Push once  dextrose 50% Injectable 12.5 Gram(s) IV Push once  enoxaparin Injectable 90 milliGRAM(s) SubCutaneous daily  epoetin analilia-epbx (RETACRIT) Injectable 07451 Unit(s) IV Push <User Schedule>  glucagon  Injectable 1 milliGRAM(s) IntraMuscular once  insulin lispro (ADMELOG) corrective regimen sliding scale   SubCutaneous every 6 hours  melatonin 5 milliGRAM(s) Oral at bedtime  metoprolol tartrate 12.5 milliGRAM(s) Oral every 12 hours  midodrine. 5 milliGRAM(s) Oral three times a day  Nephro-debbie 1 Tablet(s) Oral daily  pantoprazole  Injectable 40 milliGRAM(s) IV Push daily  polyethylene glycol 3350 17 Gram(s) Oral daily  senna 2 Tablet(s) Oral at bedtime  valproate sodium IVPB 250 milliGRAM(s) IV Intermittent every 8 hours    MEDICATIONS  (PRN):  acetaminophen    Suspension .. 650 milliGRAM(s) Oral every 6 hours PRN Temp greater or equal to 38C (100.4F), Mild Pain (1 - 3)  sodium chloride 0.9% lock flush 10 milliLiter(s) IV Push every 1 hour PRN Pre/post blood products, medications, blood draw, and to maintain line patency  sodium chloride 0.9% lock flush 10 milliLiter(s) IV Push every 1 hour PRN Pre/post blood products, medications, blood draw, and to maintain line patency      RADIOLOGY & ADDITIONAL TESTS:

## 2021-05-14 LAB
ALBUMIN SERPL ELPH-MCNC: 2.8 G/DL — LOW (ref 3.3–5.2)
ALP SERPL-CCNC: 74 U/L — SIGNIFICANT CHANGE UP (ref 40–120)
ALT FLD-CCNC: 7 U/L — SIGNIFICANT CHANGE UP
ANION GAP SERPL CALC-SCNC: 17 MMOL/L — SIGNIFICANT CHANGE UP (ref 5–17)
AST SERPL-CCNC: 18 U/L — SIGNIFICANT CHANGE UP
BILIRUB SERPL-MCNC: 0.4 MG/DL — SIGNIFICANT CHANGE UP (ref 0.4–2)
BUN SERPL-MCNC: 28 MG/DL — HIGH (ref 8–20)
CALCIUM SERPL-MCNC: 8.7 MG/DL — SIGNIFICANT CHANGE UP (ref 8.6–10.2)
CHLORIDE SERPL-SCNC: 102 MMOL/L — SIGNIFICANT CHANGE UP (ref 98–107)
CO2 SERPL-SCNC: 24 MMOL/L — SIGNIFICANT CHANGE UP (ref 22–29)
CREAT SERPL-MCNC: 6.55 MG/DL — HIGH (ref 0.5–1.3)
GLUCOSE BLDC GLUCOMTR-MCNC: 102 MG/DL — HIGH (ref 70–99)
GLUCOSE BLDC GLUCOMTR-MCNC: 106 MG/DL — HIGH (ref 70–99)
GLUCOSE BLDC GLUCOMTR-MCNC: 110 MG/DL — HIGH (ref 70–99)
GLUCOSE BLDC GLUCOMTR-MCNC: 116 MG/DL — HIGH (ref 70–99)
GLUCOSE BLDC GLUCOMTR-MCNC: 129 MG/DL — HIGH (ref 70–99)
GLUCOSE BLDC GLUCOMTR-MCNC: 135 MG/DL — HIGH (ref 70–99)
GLUCOSE SERPL-MCNC: 109 MG/DL — HIGH (ref 70–99)
MAGNESIUM SERPL-MCNC: 2.4 MG/DL — SIGNIFICANT CHANGE UP (ref 1.8–2.6)
POTASSIUM SERPL-MCNC: 4.2 MMOL/L — SIGNIFICANT CHANGE UP (ref 3.5–5.3)
POTASSIUM SERPL-SCNC: 4.2 MMOL/L — SIGNIFICANT CHANGE UP (ref 3.5–5.3)
PROT SERPL-MCNC: 6.7 G/DL — SIGNIFICANT CHANGE UP (ref 6.6–8.7)
SODIUM SERPL-SCNC: 142 MMOL/L — SIGNIFICANT CHANGE UP (ref 135–145)

## 2021-05-14 PROCEDURE — 90937 HEMODIALYSIS REPEATED EVAL: CPT

## 2021-05-14 PROCEDURE — 99233 SBSQ HOSP IP/OBS HIGH 50: CPT

## 2021-05-14 RX ORDER — METOPROLOL TARTRATE 50 MG
12.5 TABLET ORAL EVERY 6 HOURS
Refills: 0 | Status: DISCONTINUED | OUTPATIENT
Start: 2021-05-14 | End: 2021-05-16

## 2021-05-14 RX ORDER — QUETIAPINE FUMARATE 200 MG/1
25 TABLET, FILM COATED ORAL EVERY 8 HOURS
Refills: 0 | Status: DISCONTINUED | OUTPATIENT
Start: 2021-05-14 | End: 2021-05-17

## 2021-05-14 RX ORDER — QUETIAPINE FUMARATE 200 MG/1
25 TABLET, FILM COATED ORAL
Refills: 0 | Status: DISCONTINUED | OUTPATIENT
Start: 2021-05-14 | End: 2021-05-14

## 2021-05-14 RX ADMIN — Medication 81 MILLIGRAM(S): at 17:37

## 2021-05-14 RX ADMIN — Medication 1 TABLET(S): at 17:37

## 2021-05-14 RX ADMIN — Medication 12.5 MILLIGRAM(S): at 17:39

## 2021-05-14 RX ADMIN — Medication 5 MILLIGRAM(S): at 22:31

## 2021-05-14 RX ADMIN — ERYTHROPOIETIN 10000 UNIT(S): 10000 INJECTION, SOLUTION INTRAVENOUS; SUBCUTANEOUS at 14:07

## 2021-05-14 RX ADMIN — PANTOPRAZOLE SODIUM 40 MILLIGRAM(S): 20 TABLET, DELAYED RELEASE ORAL at 17:38

## 2021-05-14 RX ADMIN — ENOXAPARIN SODIUM 90 MILLIGRAM(S): 100 INJECTION SUBCUTANEOUS at 17:37

## 2021-05-14 RX ADMIN — CHLORHEXIDINE GLUCONATE 1 APPLICATION(S): 213 SOLUTION TOPICAL at 05:33

## 2021-05-14 RX ADMIN — ATORVASTATIN CALCIUM 40 MILLIGRAM(S): 80 TABLET, FILM COATED ORAL at 22:29

## 2021-05-14 RX ADMIN — MIDODRINE HYDROCHLORIDE 5 MILLIGRAM(S): 2.5 TABLET ORAL at 05:39

## 2021-05-14 RX ADMIN — SENNA PLUS 2 TABLET(S): 8.6 TABLET ORAL at 22:29

## 2021-05-14 RX ADMIN — MIDODRINE HYDROCHLORIDE 5 MILLIGRAM(S): 2.5 TABLET ORAL at 17:36

## 2021-05-14 RX ADMIN — QUETIAPINE FUMARATE 25 MILLIGRAM(S): 200 TABLET, FILM COATED ORAL at 17:38

## 2021-05-14 RX ADMIN — QUETIAPINE FUMARATE 25 MILLIGRAM(S): 200 TABLET, FILM COATED ORAL at 22:31

## 2021-05-14 RX ADMIN — CHLORHEXIDINE GLUCONATE 1 APPLICATION(S): 213 SOLUTION TOPICAL at 05:34

## 2021-05-14 RX ADMIN — Medication 26.25 MILLIGRAM(S): at 05:30

## 2021-05-14 NOTE — PROGRESS NOTE ADULT - SUBJECTIVE AND OBJECTIVE BOX
IRENE TAYLOR    [id number]    71y      Female    INTERVAL HPI/OVERNIGHT EVENTS:  patient being seen for cva and esrd. patient seen at bedside and is in nad    REVIEW OF SYSTEMS:    unable to obtain secondary to mental status       Vital Signs Last 24 Hrs  T(C): 36.5 (14 May 2021 11:00), Max: 37.1 (13 May 2021 17:00)  T(F): 97.7 (14 May 2021 11:00), Max: 98.7 (13 May 2021 17:00)  HR: 80 (14 May 2021 11:00) (67 - 99)  BP: 93/55 (14 May 2021 04:52) (87/53 - 125/76)  BP(mean): --  RR: 19 (14 May 2021 11:00) (18 - 20)  SpO2: 99% (14 May 2021 11:00) (98% - 100%)    PHYSICAL EXAM:    GENERAL: nad  HEAD:  Atraumatic, Normocephalic  NECK: Supple, No JVD, Normal thyroid  NERVOUS SYSTEM:  Awake  CHEST/LUNG: Clear to auscultation bilaterally  HEART: Regular rate and rhythm; No murmurs, rubs, or gallops  ABDOMEN: Soft, Nontender, Nondistended; Bowel sounds present  EXTREMITIES:  2+ Peripheral Pulses, No clubbing, cyanosis, or edema        LABS:                        9.6    11.52 )-----------( 226      ( 13 May 2021 07:51 )             32.7     05-14    142  |  102  |  28.0<H>  ----------------------------<  109<H>  4.2   |  24.0  |  6.55<H>    Ca    8.7      14 May 2021 07:50  Mg     2.4     05-14    TPro  6.7  /  Alb  2.8<L>  /  TBili  0.4  /  DBili  x   /  AST  18  /  ALT  7   /  AlkPhos  74  05-14            MEDICATIONS  (STANDING):  aspirin  chewable 81 milliGRAM(s) Oral daily  atorvastatin 40 milliGRAM(s) Oral at bedtime  chlorhexidine 4% Liquid 1 Application(s) Topical <User Schedule>  chlorhexidine 4% Liquid 1 Application(s) Topical <User Schedule>  dextrose 40% Gel 15 Gram(s) Oral once  dextrose 5%. 1000 milliLiter(s) (50 mL/Hr) IV Continuous <Continuous>  dextrose 5%. 1000 milliLiter(s) (100 mL/Hr) IV Continuous <Continuous>  dextrose 50% Injectable 25 Gram(s) IV Push once  dextrose 50% Injectable 12.5 Gram(s) IV Push once  enoxaparin Injectable 90 milliGRAM(s) SubCutaneous daily  epoetin analilia-epbx (RETACRIT) Injectable 22514 Unit(s) IV Push <User Schedule>  glucagon  Injectable 1 milliGRAM(s) IntraMuscular once  insulin lispro (ADMELOG) corrective regimen sliding scale   SubCutaneous every 6 hours  melatonin 5 milliGRAM(s) Oral at bedtime  metoprolol tartrate 12.5 milliGRAM(s) Oral every 6 hours  midodrine. 5 milliGRAM(s) Oral three times a day  Nephro-debbie 1 Tablet(s) Oral daily  pantoprazole  Injectable 40 milliGRAM(s) IV Push daily  polyethylene glycol 3350 17 Gram(s) Oral daily  QUEtiapine 25 milliGRAM(s) Oral every 8 hours  senna 2 Tablet(s) Oral at bedtime    MEDICATIONS  (PRN):  acetaminophen    Suspension .. 650 milliGRAM(s) Oral every 6 hours PRN Temp greater or equal to 38C (100.4F), Mild Pain (1 - 3)  sodium chloride 0.9% lock flush 10 milliLiter(s) IV Push every 1 hour PRN Pre/post blood products, medications, blood draw, and to maintain line patency  sodium chloride 0.9% lock flush 10 milliLiter(s) IV Push every 1 hour PRN Pre/post blood products, medications, blood draw, and to maintain line patency      RADIOLOGY & ADDITIONAL TESTS:

## 2021-05-14 NOTE — PROGRESS NOTE ADULT - SUBJECTIVE AND OBJECTIVE BOX
Patient fatigued and limited arousal.  Continues to have mits and wrist restraints for fear she will pull out HD lines.    REVIEW OF SYSTEMS  Constitutional - No fever,  +fatigue  Neurological - +loss of strength    FUNCTIONAL PROGRESS  5/14  Speech Language Pathology Recommendations: 1. Modify diet to:puree & NECTAR thick liquids2. Fluids VIA TSP or cup sip3. Meds crushed in puree, as able4. 1:1 assist for all meals5. STRICT aspiration precautions: if demonstrating s/s, d/c nectar thick fluids & resume honey thick, pending re-evaluation by this department6. Small bites/sips, slow rate, cues for no verbalization w/PO in oral cavity 7. Oral care8. ONLY FEED WHEN AWAKE/ALERT & WHEN PT ACTIVELY ENGAGING 9. SLP to follow     5/12  Bed Mobility: Sit to Supine:     · Level of [place]	unable to perform    Bed Mobility: Supine to Sit:     · Level of [place]	unable to perform; pt resistant to mobility due to confusion    Transfer: Bed to Chair:    Bed to Chair Transfer:    Transfer Skill: Bed to Chair   · Level of [place]	unable to perform    Transfer: Sit to Stand:     · Level of [place]	unable to perform    Transfer: Stand to Sit:     · Level of [place]	unable to perform    Transfer: Toilet Transfer:     · Level of [place]	unable to perform  Bathing Training:     · Level of [place]	dependent (less than 25% patients effort)    Upper Body Dressing Training:     · Level of [place]	dependent (less than 25% patients effort); secondary to confusion    Lower Body Dressing Training:     · Level of [place]	dependent (less than 25% patients effort)    Toilet Hygiene Training:     · Level of [place]	dependent (less than 25% patients effort); secondary to prima fit    Grooming Training:     · Level of [place]	maximum assist (25% patients effort)    Eating/Self-Feeding Training:     · Level of [place]	maximum assist (25% patients effort)        VITALS  T(C): 36.9 (05-14-21 @ 04:52), Max: 37.1 (05-13-21 @ 17:00)  HR: 67 (05-14-21 @ 04:52) (67 - 99)  BP: 93/55 (05-14-21 @ 04:52) (87/53 - 125/76)  RR: 18 (05-14-21 @ 04:52) (18 - 20)  SpO2: 98% (05-14-21 @ 04:52) (98% - 100%)  Wt(kg): --    MEDICATIONS   acetaminophen    Suspension .. 650 milliGRAM(s) every 6 hours PRN  aspirin  chewable 81 milliGRAM(s) daily  atorvastatin 40 milliGRAM(s) at bedtime  chlorhexidine 4% Liquid 1 Application(s) <User Schedule>  chlorhexidine 4% Liquid 1 Application(s) <User Schedule>  dextrose 40% Gel 15 Gram(s) once  dextrose 5%. 1000 milliLiter(s) <Continuous>  dextrose 5%. 1000 milliLiter(s) <Continuous>  dextrose 50% Injectable 25 Gram(s) once  dextrose 50% Injectable 12.5 Gram(s) once  enoxaparin Injectable 90 milliGRAM(s) daily  epoetin analilia-epbx (RETACRIT) Injectable 91250 Unit(s) <User Schedule>  glucagon  Injectable 1 milliGRAM(s) once  insulin lispro (ADMELOG) corrective regimen sliding scale   every 6 hours  melatonin 5 milliGRAM(s) at bedtime  metoprolol tartrate 12.5 milliGRAM(s) every 6 hours  midodrine. 5 milliGRAM(s) three times a day  Nephro-debbie 1 Tablet(s) daily  pantoprazole  Injectable 40 milliGRAM(s) daily  polyethylene glycol 3350 17 Gram(s) daily  senna 2 Tablet(s) at bedtime  sodium chloride 0.9% lock flush 10 milliLiter(s) every 1 hour PRN  sodium chloride 0.9% lock flush 10 milliLiter(s) every 1 hour PRN  valproate sodium IVPB 250 milliGRAM(s) every 8 hours      RECENT LABS/IMAGING                          9.6    11.52 )-----------( 226      ( 13 May 2021 07:51 )             32.7     05-14    142  |  102  |  28.0<H>  ----------------------------<  109<H>  4.2   |  24.0  |  6.55<H>    Ca    8.7      14 May 2021 07:50  Phos  4.8     05-12  Mg     2.4     05-14    TPro  6.7  /  Alb  2.8<L>  /  TBili  0.4  /  DBili  x   /  AST  18  /  ALT  7   /  AlkPhos  74  05-14                  HEAD CT 5/3 - An evolving left-sided PCA distribution infarction is again seen with areas of gyral hyperattenuation. Findings may indicate superimposed petechial hemorrhagic transformation versus cortical laminar necrosis versus a combination of both. No new areas of acute intracranial hemorrhage are seen. Multiple small chronic infarcts are noted within the right cerebellar hemisphere. There is no hydrocephalus. There is diffuse cerebral volume loss with prominence of the sulci, fissures, and cisternal spaces which is normal for the patient's age. There is mild periventricular white matter hypoattenuation statistically compatible with microvascular changes given calcific atherosclerotic disease of the intracranial arteries. The paranasal sinuses and mastoid air cells are clear. The calvarium appears intact. The orbits appear unremarkable apart from cataract removal.    HEAD CT 5/11 - No significant interval change from 5/3/2021.    CXR 5/12 - Left IJ line with the tip in the right atrium and no pneumothorax, new. Mild, central pulmonary venous congestion, grossly unchanged  ----------------------------------------------------------------------------------------  PHYSICAL EXAM  Constitutional - NAD, Appears comfortable  Extremities - Bilateral wrist restraints/[place]  Neurologic Exam -                    Cognitive - Awake/Alert     Communication - None     Motor - Unable to assess   Psychiatric - Calm, Lethargic  ------------------------------------------------------------------------------------------------  ASSESSMENT/PLAN  71yFemale with functional deficits after developing an acute CVA  Left PCA CVA with hemorrhagic conversion - ASA, Lipitor  HTN - Lopressor  HypoTN - Midodrine  CRF - HD  Mood/Delirium - DC Depakote (5/14), Seroquel 25mg Q8 (5/14), Melatonin 5mg HS (5/12)  Pain - Tylenol  Oropharyngeal Dysphagia - Puree/Honey    DVT PPX - SCDs, Lovenox  Rehab - Family wants to continue aggressive treatment. Given patient's functional status and ability to participate, recommend LTCF. Patient continues to be on restraints, above recommendations are to assist with behavior and eventual DC of restraints which are impeding her potential for DC.     Continue mobilization by staff to maintain cardiopulmonary function and prevention of secondary complications related to debility.     Discussed with rehab team.

## 2021-05-14 NOTE — PROGRESS NOTE ADULT - ASSESSMENT
72 y/o female with PMHx CAD s/p stents '07, HTN, MI, PAF, liver abscess, s/p biliary stent with removal (11/2018; 7/2/19), chronic pancreatitis, DM2 on insulin, diabetic neuropathy, ESRD (5/2018) on HD (M/W/F since 5/18) presents to [facility] as a transfer from [place] after she went to HD with and had  an episode of syncope and change in mental status. HD RN reported she became unresponsive, L gaze preference, and was perseverating. Patient went for emergent CTH which revealed evolving L posterior cerebral artery infarct with new hemorrhage. Patient was subsequently intubated due to concern for deteriorating. Patient was then transferred to [facility].     Of note, patient was recently admitted to [place] on 4/12/21 for hypoxic/hypercapnic resp failure and hyperkalemia secondary to ESRD. While admitted pt was found to be confused, MRI obtained revealed large acute infarct of the left PCA. Pt was originally on Asprin which was changed to Plavix. Patient was discharged to rehab on Plavix.    Patient brought to NSICU and seen by nephro and continued on HD. Patient was extubated on 5/1. Patient seen by vascular for low flow out of the fistula and had a left femoral placed for HD. Patient downgraded to medical floor. Patients course complicated by multiple periods of pulling IV lines and the original left IJ. Family meeting attempted and family wants full code.       #Acute metabolic encephalopathy - secondary to multiple cvas in the past month  asa and statin   depakote stopped, seroquel started   neurology following  as per neurosurgery, repeat ct head on 5.11 to reassess cva - stable   ammonia, tsh WNL  - supportive care    #CVA - in setting of afib  - lovenox renally dosed started    #dm2 - elevated a1c 8.7  c.w ssi    #afib -   Continue metoprolol 12.5mg BID   no dig as per EP   lovenox renally dosed    #esrd - renal following   c.w HD  vascular following - spoke to vascular, fistulogram tues, COVID swap next week   s/p left ij replaced, patient pulled out 5/10 overnight.  vascular placed another left IJ on 5.12      prognosis poor   palliative following  - family wants full code, Family meeting had by previous hospitalist  spoke to tommy on phone   eventual [facility]RY late next week

## 2021-05-15 LAB
ALBUMIN SERPL ELPH-MCNC: 2.8 G/DL — LOW (ref 3.3–5.2)
ALP SERPL-CCNC: 72 U/L — SIGNIFICANT CHANGE UP (ref 40–120)
ALT FLD-CCNC: 7 U/L — SIGNIFICANT CHANGE UP
ANION GAP SERPL CALC-SCNC: 18 MMOL/L — HIGH (ref 5–17)
AST SERPL-CCNC: 19 U/L — SIGNIFICANT CHANGE UP
BILIRUB SERPL-MCNC: 0.4 MG/DL — SIGNIFICANT CHANGE UP (ref 0.4–2)
BUN SERPL-MCNC: 17 MG/DL — SIGNIFICANT CHANGE UP (ref 8–20)
CALCIUM SERPL-MCNC: 8.6 MG/DL — SIGNIFICANT CHANGE UP (ref 8.6–10.2)
CHLORIDE SERPL-SCNC: 97 MMOL/L — LOW (ref 98–107)
CO2 SERPL-SCNC: 23 MMOL/L — SIGNIFICANT CHANGE UP (ref 22–29)
CREAT SERPL-MCNC: 4.67 MG/DL — HIGH (ref 0.5–1.3)
GLUCOSE BLDC GLUCOMTR-MCNC: 108 MG/DL — HIGH (ref 70–99)
GLUCOSE BLDC GLUCOMTR-MCNC: 124 MG/DL — HIGH (ref 70–99)
GLUCOSE BLDC GLUCOMTR-MCNC: 128 MG/DL — HIGH (ref 70–99)
GLUCOSE BLDC GLUCOMTR-MCNC: 135 MG/DL — HIGH (ref 70–99)
GLUCOSE SERPL-MCNC: 127 MG/DL — HIGH (ref 70–99)
MAGNESIUM SERPL-MCNC: 2.2 MG/DL — SIGNIFICANT CHANGE UP (ref 1.8–2.6)
POTASSIUM SERPL-MCNC: 3.9 MMOL/L — SIGNIFICANT CHANGE UP (ref 3.5–5.3)
POTASSIUM SERPL-SCNC: 3.9 MMOL/L — SIGNIFICANT CHANGE UP (ref 3.5–5.3)
PROT SERPL-MCNC: 6.7 G/DL — SIGNIFICANT CHANGE UP (ref 6.6–8.7)
SODIUM SERPL-SCNC: 138 MMOL/L — SIGNIFICANT CHANGE UP (ref 135–145)

## 2021-05-15 PROCEDURE — 99233 SBSQ HOSP IP/OBS HIGH 50: CPT

## 2021-05-15 RX ORDER — MIDODRINE HYDROCHLORIDE 2.5 MG/1
10 TABLET ORAL THREE TIMES A DAY
Refills: 0 | Status: DISCONTINUED | OUTPATIENT
Start: 2021-05-15 | End: 2021-05-16

## 2021-05-15 RX ORDER — INSULIN LISPRO 100/ML
VIAL (ML) SUBCUTANEOUS
Refills: 0 | Status: DISCONTINUED | OUTPATIENT
Start: 2021-05-15 | End: 2021-05-23

## 2021-05-15 RX ADMIN — CHLORHEXIDINE GLUCONATE 1 APPLICATION(S): 213 SOLUTION TOPICAL at 05:19

## 2021-05-15 RX ADMIN — Medication 12.5 MILLIGRAM(S): at 00:05

## 2021-05-15 RX ADMIN — MIDODRINE HYDROCHLORIDE 10 MILLIGRAM(S): 2.5 TABLET ORAL at 11:19

## 2021-05-15 RX ADMIN — CHLORHEXIDINE GLUCONATE 1 APPLICATION(S): 213 SOLUTION TOPICAL at 05:15

## 2021-05-15 RX ADMIN — Medication 12.5 MILLIGRAM(S): at 17:13

## 2021-05-15 RX ADMIN — ENOXAPARIN SODIUM 90 MILLIGRAM(S): 100 INJECTION SUBCUTANEOUS at 11:20

## 2021-05-15 RX ADMIN — MIDODRINE HYDROCHLORIDE 5 MILLIGRAM(S): 2.5 TABLET ORAL at 05:13

## 2021-05-15 RX ADMIN — POLYETHYLENE GLYCOL 3350 17 GRAM(S): 17 POWDER, FOR SOLUTION ORAL at 11:20

## 2021-05-15 RX ADMIN — Medication 1 TABLET(S): at 11:21

## 2021-05-15 RX ADMIN — PANTOPRAZOLE SODIUM 40 MILLIGRAM(S): 20 TABLET, DELAYED RELEASE ORAL at 11:23

## 2021-05-15 RX ADMIN — Medication 12.5 MILLIGRAM(S): at 11:20

## 2021-05-15 RX ADMIN — MIDODRINE HYDROCHLORIDE 10 MILLIGRAM(S): 2.5 TABLET ORAL at 17:13

## 2021-05-15 RX ADMIN — QUETIAPINE FUMARATE 25 MILLIGRAM(S): 200 TABLET, FILM COATED ORAL at 05:13

## 2021-05-15 RX ADMIN — Medication 12.5 MILLIGRAM(S): at 05:12

## 2021-05-15 RX ADMIN — Medication 81 MILLIGRAM(S): at 11:19

## 2021-05-15 RX ADMIN — QUETIAPINE FUMARATE 25 MILLIGRAM(S): 200 TABLET, FILM COATED ORAL at 13:27

## 2021-05-15 NOTE — PROGRESS NOTE ADULT - SUBJECTIVE AND OBJECTIVE BOX
IRENE TAYLOR    [id number]    71y      Female    INTERVAL HPI/OVERNIGHT EVENTS: patient being seen for cva and esrd. patient seen at bedside and is in nad    overnight patient pulled out IJ for the second time    REVIEW OF SYSTEMS:    unable to obtain secondary to mental status     Vital Signs Last 24 Hrs  T(C): 36.6 (15 May 2021 12:02), Max: 36.9 (14 May 2021 18:42)  T(F): 97.9 (15 May 2021 12:02), Max: 98.5 (14 May 2021 18:42)  HR: 77 (15 May 2021 12:02) (77 - 99)  BP: 108/63 (15 May 2021 12:02) (94/61 - 112/57)  BP(mean): --  RR: 18 (15 May 2021 12:02) (18 - 20)  SpO2: 95% (15 May 2021 12:02) (95% - 100%)    PHYSICAL EXAM:    GENEAL: nad, restraints  HEAD:  Atraumatic, Normocephalic  NECK: Supple, No JVD, Normal thyroid  NERVOUS SYSTEM:  Awake  CHEST/LUNG: Clear to auscultation bilaterally  HEART: Regular rate and rhythm; No murmurs, rubs, or gallops  ABDOMEN: Soft, Nontender, Nondistended; Bowel sounds present  EXTREMITIES:  2+ Peripheral Pulses, No clubbing, cyanosis, or edema        LABS:    05-15    138  |  97<L>  |  17.0  ----------------------------<  127<H>  3.9   |  23.0  |  4.67<H>    Ca    8.6      15 May 2021 08:32  Mg     2.2     05-15    TPro  6.7  /  Alb  2.8<L>  /  TBili  0.4  /  DBili  x   /  AST  19  /  ALT  7   /  AlkPhos  72  05-15            MEDICATIONS  (STANDING):  aspirin  chewable 81 milliGRAM(s) Oral daily  atorvastatin 40 milliGRAM(s) Oral at bedtime  chlorhexidine 4% Liquid 1 Application(s) Topical <User Schedule>  chlorhexidine 4% Liquid 1 Application(s) Topical <User Schedule>  dextrose 40% Gel 15 Gram(s) Oral once  dextrose 5%. 1000 milliLiter(s) (50 mL/Hr) IV Continuous <Continuous>  dextrose 5%. 1000 milliLiter(s) (100 mL/Hr) IV Continuous <Continuous>  dextrose 50% Injectable 25 Gram(s) IV Push once  dextrose 50% Injectable 12.5 Gram(s) IV Push once  enoxaparin Injectable 90 milliGRAM(s) SubCutaneous daily  epoetin analilia-epbx (RETACRIT) Injectable 13520 Unit(s) IV Push <User Schedule>  glucagon  Injectable 1 milliGRAM(s) IntraMuscular once  insulin lispro (ADMELOG) corrective regimen sliding scale   SubCutaneous every 6 hours  melatonin 5 milliGRAM(s) Oral at bedtime  metoprolol tartrate 12.5 milliGRAM(s) Oral every 6 hours  midodrine. 10 milliGRAM(s) Oral three times a day  Nephro-debbie 1 Tablet(s) Oral daily  pantoprazole  Injectable 40 milliGRAM(s) IV Push daily  polyethylene glycol 3350 17 Gram(s) Oral daily  QUEtiapine 25 milliGRAM(s) Oral every 8 hours  senna 2 Tablet(s) Oral at bedtime    MEDICATIONS  (PRN):  acetaminophen    Suspension .. 650 milliGRAM(s) Oral every 6 hours PRN Temp greater or equal to 38C (100.4F), Mild Pain (1 - 3)  sodium chloride 0.9% lock flush 10 milliLiter(s) IV Push every 1 hour PRN Pre/post blood products, medications, blood draw, and to maintain line patency  sodium chloride 0.9% lock flush 10 milliLiter(s) IV Push every 1 hour PRN Pre/post blood products, medications, blood draw, and to maintain line patency      RADIOLOGY & ADDITIONAL TESTS:

## 2021-05-15 NOTE — PROGRESS NOTE ADULT - ASSESSMENT
Wrists Restrained,    Confused,    No HD Access,    Unable to continue & Sustain  long term HD,    D/W RN,    Consider Hospice * Comfort care,    GOC Meeting needs to be arranged - SW & ,    Please call as needed,    TY,

## 2021-05-15 NOTE — CHART NOTE - NSCHARTNOTEFT_GEN_A_CORE
PA NOTE-MEDICINE    Pt pulled out Dialysis catheter from Neck despite wrist restraints  No active bleeding  NAD  Will signout to Day team

## 2021-05-15 NOTE — PROGRESS NOTE ADULT - ASSESSMENT
70 y/o female with PMHx CAD s/p stents '07, HTN, MI, PAF, liver abscess, s/p biliary stent with removal (11/2018; 7/2/19), chronic pancreatitis, DM2 on insulin, diabetic neuropathy, ESRD (5/2018) on HD (M/W/F since 5/18) presents to [facility] as a transfer from [place] after she went to HD with and had  an episode of syncope and change in mental status. HD RN reported she became unresponsive, L gaze preference, and was perseverating. Patient went for emergent CTH which revealed evolving L posterior cerebral artery infarct with new hemorrhage. Patient was subsequently intubated due to concern for deteriorating. Patient was then transferred to [facility].     Of note, patient was recently admitted to [place] on 4/12/21 for hypoxic/hypercapnic resp failure and hyperkalemia secondary to ESRD. While admitted pt was found to be confused, MRI obtained revealed large acute infarct of the left PCA. Pt was originally on Asprin which was changed to Plavix. Patient was discharged to rehab on Plavix.    Patient brought to NSICU and seen by nephro and continued on HD. Patient was extubated on 5/1. Patient seen by vascular for low flow out of the fistula and had a left femoral placed for HD. Patient downgraded to medical floor. Patients course complicated by multiple periods of pulling IV lines and the original left IJ. Family meeting attempted and family wants full code.     overnight patient pulled her second left IJ    #Acute metabolic encephalopathy - secondary to multiple cvas in the past month  asa and statin   depakote stopped, seroquel started   neurology following  as per neurosurgery, repeat ct head on 5.11 to reassess cva - stable   ammonia, tsh WNL  - supportive care    #CVA - in setting of afib  - lovenox renally dosed started, hold tuesdays dose    #dm2 - elevated a1c 8.7  c.w ssi    #afib -   Continue metoprolol 12.5mg qid  no dig as per EP   lovenox renally dosed    #esrd - renal following   c.w HD  vascular following - spoke to vascular, fistulogram tues, COVID swab next week   patient pulled her second ij     prognosis poor   palliative following  - family wants full code, Family meeting had by previous hospitalist  eventual [facility] late next week post fistulogram

## 2021-05-15 NOTE — CHART NOTE - NSCHARTNOTEFT_GEN_A_CORE
Source: Patient [ ]  Family [ ]   other [X]     Current Diet: Diet, Dysphagia 1 Pureed-Nectar Consistency Fluid (05-14-21 @ 10:14)    PO intake:  < 50% [ ]   50-75%  [ ]   %  [X]  other: 50% per flowsheet documentation    Source for PO intake [ ] Patient [ ] family [X] chart [ ] staff [ ] other    Current Weight:   203.4# (4/29/21)  364.8# (4/30/21)  365.3# (5/1/21)  363.3# (5/3/21)  190.2# (5/5/21)  189.8# (5/7/21)  187.8# (5/12/21)  178.5# (5/14/21)  Question accuracy of weights. Weights 363#[id number]# likely inaccurate. Pt likely noted with weight loss since admission.     Pertinent Medications: MEDICATIONS  (STANDING):  aspirin  chewable 81 milliGRAM(s) Oral daily  atorvastatin 40 milliGRAM(s) Oral at bedtime  chlorhexidine 4% Liquid 1 Application(s) Topical <User Schedule>  chlorhexidine 4% Liquid 1 Application(s) Topical <User Schedule>  dextrose 40% Gel 15 Gram(s) Oral once  dextrose 5%. 1000 milliLiter(s) (50 mL/Hr) IV Continuous <Continuous>  dextrose 5%. 1000 milliLiter(s) (100 mL/Hr) IV Continuous <Continuous>  dextrose 50% Injectable 25 Gram(s) IV Push once  dextrose 50% Injectable 12.5 Gram(s) IV Push once  enoxaparin Injectable 90 milliGRAM(s) SubCutaneous daily  epoetin analilia-epbx (RETACRIT) Injectable 64159 Unit(s) IV Push <User Schedule>  glucagon  Injectable 1 milliGRAM(s) IntraMuscular once  insulin lispro (ADMELOG) corrective regimen sliding scale   SubCutaneous every 6 hours  melatonin 5 milliGRAM(s) Oral at bedtime  metoprolol tartrate 12.5 milliGRAM(s) Oral every 6 hours  midodrine. 10 milliGRAM(s) Oral three times a day  Nephro-debbie 1 Tablet(s) Oral daily  pantoprazole  Injectable 40 milliGRAM(s) IV Push daily  polyethylene glycol 3350 17 Gram(s) Oral daily  QUEtiapine 25 milliGRAM(s) Oral every 8 hours  senna 2 Tablet(s) Oral at bedtime    MEDICATIONS  (PRN):  acetaminophen    Suspension .. 650 milliGRAM(s) Oral every 6 hours PRN Temp greater or equal to 38C (100.4F), Mild Pain (1 - 3)  sodium chloride 0.9% lock flush 10 milliLiter(s) IV Push every 1 hour PRN Pre/post blood products, medications, blood draw, and to maintain line patency  sodium chloride 0.9% lock flush 10 milliLiter(s) IV Push every 1 hour PRN Pre/post blood products, medications, blood draw, and to maintain line patency    Pertinent Labs: 05-15 Na138 mmol/L Glu 127 mg/dL<H> K+ 3.9 mmol/L Cr  4.67 mg/dL<H> BUN 17.0 mg/dL Phos n/a   Alb 2.8 g/dL<L> PAB n/a       Skin: No pressure ulcers noted.    Nutrition focused physical exam not conducted at this time- found signs of malnutrition [ ]absent [ ]present    Subcutaneous fat loss: [ ] Orbital fat pads region, [ ]Buccal fat region, [ ]Triceps region,  [ ]Ribs region    Muscle wasting: [ ]Temples region, [ ]Clavicle region, [ ]Shoulder region, [ ]Scapula region, [ ]Interosseous region,  [ ]thigh region, [ ]Calf region    Estimated Needs:   [X] no change since previous assessment  [ ] recalculated:     Current Nutrition Diagnosis: Pt remains at high nutrition risk secondary to malnutrition (moderate acute) related to inability to consume sufficient protein-energy needs 2/2 ESRD on HD, s/p SDH, failed bedside SLP eval with continued refusal to accept PO as evidenced by meeting <50% EER x 5 days, generalized edema. Per SLP recommendation 5/14/21, pt diet upgraded to 5/14/21. Aware GOC noted 5/11/21 and palliative care following. RD to remain available.    Recommendations:   1. Follow SLP rx. Recommend adding Renal restriction and consistent carbohydrate to diet order.  2. Provide assistance at meals and encourage PO intake.  3. Recommend Nepro (Nectar consistency as per SLP recommendation) TID to optimize PO intake  4. Monitor weights  5. Monitor labs  6. Continue Nephro-debbie daily    Monitoring and Evaluation:   [X] PO intake [X] Tolerance to diet prescription [X] Weights  [X] Follow up per protocol [X] Labs Source: Patient [ ]  Family [ ]   other [X]     Current Diet: Diet, Dysphagia 1 Pureed-Nectar Consistency Fluid (05-14-21 @ 10:14)    PO intake:  < 50% [ ]   50-75%  [ ]   %  [X]  other: 50% per flowsheet documentation    Source for PO intake [ ] Patient [ ] family [X] chart [ ] staff [ ] other    Current Weight:   203.4# (4/29/21)  364.8# (4/30/21)  365.3# (5/1/21)  363.3# (5/3/21)  190.2# (5/5/21)  189.8# (5/7/21)  187.8# (5/12/21)  178.5# (5/14/21)  Question accuracy of weights. Weights 363#[id number]# likely inaccurate. Pt likely noted with weight loss since admission.     Pertinent Medications: MEDICATIONS  (STANDING):  aspirin  chewable 81 milliGRAM(s) Oral daily  atorvastatin 40 milliGRAM(s) Oral at bedtime  chlorhexidine 4% Liquid 1 Application(s) Topical <User Schedule>  chlorhexidine 4% Liquid 1 Application(s) Topical <User Schedule>  dextrose 40% Gel 15 Gram(s) Oral once  dextrose 5%. 1000 milliLiter(s) (50 mL/Hr) IV Continuous <Continuous>  dextrose 5%. 1000 milliLiter(s) (100 mL/Hr) IV Continuous <Continuous>  dextrose 50% Injectable 25 Gram(s) IV Push once  dextrose 50% Injectable 12.5 Gram(s) IV Push once  enoxaparin Injectable 90 milliGRAM(s) SubCutaneous daily  epoetin analilia-epbx (RETACRIT) Injectable 83154 Unit(s) IV Push <User Schedule>  glucagon  Injectable 1 milliGRAM(s) IntraMuscular once  insulin lispro (ADMELOG) corrective regimen sliding scale   SubCutaneous every 6 hours  melatonin 5 milliGRAM(s) Oral at bedtime  metoprolol tartrate 12.5 milliGRAM(s) Oral every 6 hours  midodrine. 10 milliGRAM(s) Oral three times a day  Nephro-debbie 1 Tablet(s) Oral daily  pantoprazole  Injectable 40 milliGRAM(s) IV Push daily  polyethylene glycol 3350 17 Gram(s) Oral daily  QUEtiapine 25 milliGRAM(s) Oral every 8 hours  senna 2 Tablet(s) Oral at bedtime    MEDICATIONS  (PRN):  acetaminophen    Suspension .. 650 milliGRAM(s) Oral every 6 hours PRN Temp greater or equal to 38C (100.4F), Mild Pain (1 - 3)  sodium chloride 0.9% lock flush 10 milliLiter(s) IV Push every 1 hour PRN Pre/post blood products, medications, blood draw, and to maintain line patency  sodium chloride 0.9% lock flush 10 milliLiter(s) IV Push every 1 hour PRN Pre/post blood products, medications, blood draw, and to maintain line patency    Pertinent Labs: 05-15 Na138 mmol/L Glu 127 mg/dL<H> K+ 3.9 mmol/L Cr  4.67 mg/dL<H> BUN 17.0 mg/dL Phos n/a   Alb 2.8 g/dL<L> PAB n/a       Skin: No pressure ulcers noted.    Nutrition focused physical exam not conducted at this time- found signs of malnutrition [ ]absent [ ]present    Subcutaneous fat loss: [ ] Orbital fat pads region, [ ]Buccal fat region, [ ]Triceps region,  [ ]Ribs region    Muscle wasting: [ ]Temples region, [ ]Clavicle region, [ ]Shoulder region, [ ]Scapula region, [ ]Interosseous region,  [ ]thigh region, [ ]Calf region    Estimated Needs:   [X] no change since previous assessment  [ ] recalculated:     Current Nutrition Diagnosis: Pt remains at high nutrition risk secondary to malnutrition (moderate acute) related to inability to consume sufficient protein-energy needs 2/2 ESRD on HD, s/p SDH as evidenced by meeting <50% EER x 5 days, generalized edema. Per SLP recommendation 5/14/21, pt diet upgraded to 5/14/21. Aware GOC noted 5/11/21 and palliative care following. RD to remain available.    Recommendations:   1. Follow SLP rx. Recommend adding Renal restriction and consistent carbohydrate to diet order.  2. Provide assistance at meals and encourage PO intake.  3. Recommend Nepro (Nectar consistency as per SLP recommendation) TID to optimize PO intake  4. Monitor weights  5. Monitor labs  6. Continue Nephro-debbie daily    Monitoring and Evaluation:   [X] PO intake [X] Tolerance to diet prescription [X] Weights  [X] Follow up per protocol [X] Labs

## 2021-05-15 NOTE — PROGRESS NOTE ADULT - SUBJECTIVE AND OBJECTIVE BOX
IRENE TAYLOR    [id number]    72yo      Female    INTERVAL HPI/ OVERNIGHT EVENTS:  patient being seen for ESRD, No HD Access,    patient seen at bedside and is in nad, Restless & Confused,    REVIEW OF SYSTEMS:    unable to obtain secondary to mental status     Vital Signs Last 24 Hrs  T(C): 36.5 (14 May 2021 11:00), Max: 37.1 (13 May 2021 17:00)  T(F): 97.7 (14 May 2021 11:00), Max: 98.7 (13 May 2021 17:00)  HR: 80 (14 May 2021 11:00) (67 - 99)  BP: 93/55 (14 May 2021 04:52) (87/53 - 125/76)  BP(mean): --  RR: 19 (14 May 2021 11:00) (18 - 20)  SpO2: 99% (14 May 2021 11:00) (98% - 100%)    PHYSICAL EXAM:    GENERAL: nad  HEAD:  Atraumatic, Normocephalic  NECK: Supple, No JVD, Normal thyroid  NERVOUS SYSTEM:  Awake  CHEST/LUNG: Clear to auscultation bilaterally  HEART: Regular rate and rhythm; No murmurs, rubs, or gallops  ABDOMEN: Soft, Nontender, Nondistended; Bowel sounds present  EXTREMITIES:  2+ Peripheral Pulses, No clubbing, cyanosis, or edema    LABS:                        9.6    11.52 )-----------( 226      ( 13 May 2021 07:51 )             32.7     05-14    142  |  102  |  28.0<H>  ----------------------------<  109<H>  4.2   |  24.0  |  6.55<H>    Ca    8.7      14 May 2021 07:50  Mg     2.4     05-14    TPro  6.7  /  Alb  2.8<L>  /  TBili  0.4  /  DBili  x   /  AST  18  /  ALT  7   /  AlkPhos  74      MEDICATIONS  (STANDING):  aspirin  chewable 81 milliGRAM(s) Oral daily  atorvastatin 40 milliGRAM(s) Oral at bedtime  chlorhexidine 4% Liquid 1 Application(s) Topical <User Schedule>  chlorhexidine 4% Liquid 1 Application(s) Topical <User Schedule>  dextrose 40% Gel 15 Gram(s) Oral once  dextrose 5%. 1000 milliLiter(s) (50 mL/Hr) IV Continuous <Continuous>  dextrose 5%. 1000 milliLiter(s) (100 mL/Hr) IV Continuous <Continuous>  dextrose 50% Injectable 25 Gram(s) IV Push once  dextrose 50% Injectable 12.5 Gram(s) IV Push once  enoxaparin Injectable 90 milliGRAM(s) SubCutaneous daily  epoetin analilia-epbx (RETACRIT) Injectable 23552 Unit(s) IV Push <User Schedule>  glucagon  Injectable 1 milliGRAM(s) IntraMuscular once  insulin lispro (ADMELOG) corrective regimen sliding scale   SubCutaneous every 6 hours  melatonin 5 milliGRAM(s) Oral at bedtime  metoprolol tartrate 12.5 milliGRAM(s) Oral every 6 hours  midodrine. 5 milliGRAM(s) Oral three times a day  Nephro-debbie 1 Tablet(s) Oral daily  pantoprazole  Injectable 40 milliGRAM(s) IV Push daily  polyethylene glycol 3350 17 Gram(s) Oral daily  QUEtiapine 25 milliGRAM(s) Oral every 8 hours  senna 2 Tablet(s) Oral at bedtime    MEDICATIONS  (PRN):  acetaminophen    Suspension .. 650 milliGRAM(s) Oral every 6 hours PRN Temp greater or equal to 38C (100.4F), Mild Pain (1 - 3)  sodium chloride 0.9% lock flush 10 milliLiter(s) IV Push every 1 hour PRN Pre/post blood products, medications, blood draw, and to maintain line patency  sodium chloride 0.9% lock flush 10 milliLiter(s) IV Push every 1 hour PRN Pre/post blood products, medications, blood draw, and to maintain line patency    70 y/o female with PMHx CAD s/p stents '07, HTN, MI, PAF, liver abscess, s/p biliary stent with removal (11/2018; 7/2/19), chronic pancreatitis, DM2 on insulin, diabetic neuropathy, ESRD (5/2018) on HD (M/W/F since 5/18) presents to [facility] as a transfer from [facility] after she went to HD with and had  an episode of syncope and change in mental status. HD RN reported she became unresponsive, L gaze preference, and was perseverating. Patient went for emergent CTH which revealed evolving L posterior cerebral artery infarct with new hemorrhage. Patient was subsequently intubated due to concern for deteriorating. Patient was then transferred to [facility].     Of note, patient was recently admitted to [facility] on 4/12/21 for hypoxic/hypercapnic resp failure and hyperkalemia secondary to ESRD. While admitted pt was found to be confused, MRI obtained revealed large acute infarct of the left PCA. Pt was originally on Asprin which was changed to Plavix. Patient was discharged to rehab on Plavix.    Patient brought to NSICU and seen by nephro and continued on HD. Patient was extubated on 5/1. Patient seen by vascular for low flow out of the fistula and had a left femoral placed for HD. Patient downgraded to medical floor. Patients course complicated by multiple periods of pulling IV lines and the original left IJ. Family meeting attempted and family wants full code.       #Acute metabolic encephalopathy - secondary to multiple cvas in the past month  asa and statin   depakote stopped, seroquel started   neurology following  as per neurosurgery, repeat ct head on 5.11 to reassess cva - stable   ammonia, tsh WNL  - supportive care    #CVA - in setting of afib  - lovenox renally dosed started    #dm2 - elevated a1c 8.7  c.w ssi    #afib -   Continue metoprolol 12.5mg BID   no dig as per EP   lovenox renally dosed    #ESRD,     s/p left ij replaced, patient pulled out 5/10 overnight.  vascular placed another left IJ on 5.12.21,    prognosis poor   palliative following  - family wants full code, Family meeting had by previous hospitalist

## 2021-05-16 LAB
ALBUMIN SERPL ELPH-MCNC: 3 G/DL — LOW (ref 3.3–5.2)
ALP SERPL-CCNC: 77 U/L — SIGNIFICANT CHANGE UP (ref 40–120)
ALT FLD-CCNC: 7 U/L — SIGNIFICANT CHANGE UP
ANION GAP SERPL CALC-SCNC: 18 MMOL/L — HIGH (ref 5–17)
AST SERPL-CCNC: 16 U/L — SIGNIFICANT CHANGE UP
BILIRUB SERPL-MCNC: 0.4 MG/DL — SIGNIFICANT CHANGE UP (ref 0.4–2)
BUN SERPL-MCNC: 24 MG/DL — HIGH (ref 8–20)
CALCIUM SERPL-MCNC: 8.8 MG/DL — SIGNIFICANT CHANGE UP (ref 8.6–10.2)
CHLORIDE SERPL-SCNC: 98 MMOL/L — SIGNIFICANT CHANGE UP (ref 98–107)
CO2 SERPL-SCNC: 23 MMOL/L — SIGNIFICANT CHANGE UP (ref 22–29)
CREAT SERPL-MCNC: 6.45 MG/DL — HIGH (ref 0.5–1.3)
GLUCOSE BLDC GLUCOMTR-MCNC: 120 MG/DL — HIGH (ref 70–99)
GLUCOSE BLDC GLUCOMTR-MCNC: 124 MG/DL — HIGH (ref 70–99)
GLUCOSE BLDC GLUCOMTR-MCNC: 153 MG/DL — HIGH (ref 70–99)
GLUCOSE BLDC GLUCOMTR-MCNC: 164 MG/DL — HIGH (ref 70–99)
GLUCOSE SERPL-MCNC: 116 MG/DL — HIGH (ref 70–99)
MAGNESIUM SERPL-MCNC: 2.3 MG/DL — SIGNIFICANT CHANGE UP (ref 1.6–2.6)
POTASSIUM SERPL-MCNC: 4.1 MMOL/L — SIGNIFICANT CHANGE UP (ref 3.5–5.3)
POTASSIUM SERPL-SCNC: 4.1 MMOL/L — SIGNIFICANT CHANGE UP (ref 3.5–5.3)
PROT SERPL-MCNC: 6.9 G/DL — SIGNIFICANT CHANGE UP (ref 6.6–8.7)
SARS-COV-2 RNA SPEC QL NAA+PROBE: SIGNIFICANT CHANGE UP
SODIUM SERPL-SCNC: 139 MMOL/L — SIGNIFICANT CHANGE UP (ref 135–145)

## 2021-05-16 PROCEDURE — 99233 SBSQ HOSP IP/OBS HIGH 50: CPT

## 2021-05-16 RX ORDER — HALOPERIDOL DECANOATE 100 MG/ML
2.5 INJECTION INTRAMUSCULAR EVERY 8 HOURS
Refills: 0 | Status: DISCONTINUED | OUTPATIENT
Start: 2021-05-16 | End: 2021-05-23

## 2021-05-16 RX ORDER — METOPROLOL TARTRATE 50 MG
12.5 TABLET ORAL EVERY 6 HOURS
Refills: 0 | Status: DISCONTINUED | OUTPATIENT
Start: 2021-05-16 | End: 2021-05-23

## 2021-05-16 RX ORDER — MIDODRINE HYDROCHLORIDE 2.5 MG/1
15 TABLET ORAL THREE TIMES A DAY
Refills: 0 | Status: DISCONTINUED | OUTPATIENT
Start: 2021-05-16 | End: 2021-05-23

## 2021-05-16 RX ADMIN — SENNA PLUS 2 TABLET(S): 8.6 TABLET ORAL at 23:32

## 2021-05-16 RX ADMIN — Medication 12.5 MILLIGRAM(S): at 12:41

## 2021-05-16 RX ADMIN — ENOXAPARIN SODIUM 90 MILLIGRAM(S): 100 INJECTION SUBCUTANEOUS at 12:41

## 2021-05-16 RX ADMIN — POLYETHYLENE GLYCOL 3350 17 GRAM(S): 17 POWDER, FOR SOLUTION ORAL at 12:42

## 2021-05-16 RX ADMIN — CHLORHEXIDINE GLUCONATE 1 APPLICATION(S): 213 SOLUTION TOPICAL at 05:28

## 2021-05-16 RX ADMIN — ATORVASTATIN CALCIUM 40 MILLIGRAM(S): 80 TABLET, FILM COATED ORAL at 23:31

## 2021-05-16 RX ADMIN — MIDODRINE HYDROCHLORIDE 15 MILLIGRAM(S): 2.5 TABLET ORAL at 12:40

## 2021-05-16 RX ADMIN — QUETIAPINE FUMARATE 25 MILLIGRAM(S): 200 TABLET, FILM COATED ORAL at 08:07

## 2021-05-16 RX ADMIN — Medication 1 TABLET(S): at 12:40

## 2021-05-16 RX ADMIN — HALOPERIDOL DECANOATE 2.5 MILLIGRAM(S): 100 INJECTION INTRAMUSCULAR at 10:47

## 2021-05-16 RX ADMIN — Medication 12.5 MILLIGRAM(S): at 08:07

## 2021-05-16 RX ADMIN — Medication 81 MILLIGRAM(S): at 12:41

## 2021-05-16 RX ADMIN — Medication 2: at 23:04

## 2021-05-16 RX ADMIN — Medication 12.5 MILLIGRAM(S): at 23:32

## 2021-05-16 RX ADMIN — MIDODRINE HYDROCHLORIDE 10 MILLIGRAM(S): 2.5 TABLET ORAL at 08:07

## 2021-05-16 RX ADMIN — QUETIAPINE FUMARATE 25 MILLIGRAM(S): 200 TABLET, FILM COATED ORAL at 12:41

## 2021-05-16 RX ADMIN — QUETIAPINE FUMARATE 25 MILLIGRAM(S): 200 TABLET, FILM COATED ORAL at 23:32

## 2021-05-16 RX ADMIN — MIDODRINE HYDROCHLORIDE 15 MILLIGRAM(S): 2.5 TABLET ORAL at 16:55

## 2021-05-16 RX ADMIN — Medication 5 MILLIGRAM(S): at 23:31

## 2021-05-16 RX ADMIN — Medication 2: at 12:42

## 2021-05-16 NOTE — PROGRESS NOTE ADULT - ASSESSMENT
72 y/o female with PMHx CAD s/p stents '07, HTN, MI, PAF, liver abscess, s/p biliary stent with removal (11/2018; 7/2/19), chronic pancreatitis, DM2 on insulin, diabetic neuropathy, ESRD (5/2018) on HD (M/W/F since 5/18) presents to [facility] as a transfer from [place] after she went to HD with and had  an episode of syncope and change in mental status. HD RN reported she became unresponsive, L gaze preference, and was perseverating. Patient went for emergent CTH which revealed evolving L posterior cerebral artery infarct with new hemorrhage. Patient was subsequently intubated due to concern for deteriorating. Patient was then transferred to [facility].     Of note, patient was recently admitted to [place] on 4/12/21 for hypoxic/hypercapnic resp failure and hyperkalemia secondary to ESRD. While admitted pt was found to be confused, MRI obtained revealed large acute infarct of the left PCA. Pt was originally on Asprin which was changed to Plavix. Patient was discharged to rehab on Plavix.    Patient brought to NSICU and seen by nephro and continued on HD. Patient was extubated on 5/1. Patient seen by vascular for low flow out of the fistula and had a left femoral placed for HD. Patient downgraded to medical floor. Patients course complicated by multiple periods of pulling IV lines and the original left IJ. Family meeting attempted and family wants full code.      patient pulled her second left IJ on 5.14  patient also pulled her peripheral lines today     #Acute metabolic encephalopathy in setting of dementia - secondary to multiple cvas in the past month  asa and statin   depakote stopped, c/w seroquel   neurology following  ct head repeated on 5.11 - stable cva  ammonia, tsh WNL  - supportive care    #CVA - in setting of afib  - lovenox renally dosed started, hold tuesdays dose    #dm2 - elevated a1c 8.7  c.w ssi    #afib -   Continue metoprolol 12.5mg qid  no dig as per EP   lovenox renally dosed    #esrd - renal following   c.w HD  vascular following - spoke to vascular, fistulogram tues, COVID swab today   patient pulled her second ij     prognosis poor   palliative following  - family wants full code, Family meeting had by previous hospitalist  lele ZAPIEN thgis week post fistulogram

## 2021-05-16 NOTE — PROGRESS NOTE ADULT - SUBJECTIVE AND OBJECTIVE BOX
IRENE TAYLOR    [id number]    70yo      Female    INTERVAL HPI/ OVERNIGHT EVENTS:  patient being seen for ESRD, No HD Access,    patient seen at bedside and is in nad, Restless & Confused,    REVIEW OF SYSTEMS:    unable to obtain secondary to mental status     Vital Signs Last 24 Hrs  T(C): 36.5 (14 May 2021 11:00), Max: 37.1 (13 May 2021 17:00)  T(F): 97.7 (14 May 2021 11:00), Max: 98.7 (13 May 2021 17:00)  HR: 80 (14 May 2021 11:00) (67 - 99)  BP: 93/55 (14 May 2021 04:52) (87/53 - 125/76)  BP(mean): --  RR: 19 (14 May 2021 11:00) (18 - 20)  SpO2: 99% (14 May 2021 11:00) (98% - 100%)    PHYSICAL EXAM:    GENERAL: nad  HEAD:  Atraumatic, Normocephalic  NECK: Supple, No JVD, Normal thyroid  NERVOUS SYSTEM:  Awake  CHEST/LUNG: Clear to auscultation bilaterally  HEART: Regular rate and rhythm; No murmurs, rubs, or gallops  ABDOMEN: Soft, Nontender, Nondistended; Bowel sounds present  EXTREMITIES:  2+ Peripheral Pulses, No clubbing, cyanosis, or edema    LABS:                        9.6    11.52 )-----------( 226      ( 13 May 2021 07:51 )             32.7     05-14    142  |  102  |  28.0<H>  ----------------------------<  109<H>  4.2   |  24.0  |  6.55<H>    Ca    8.7      14 May 2021 07:50  Mg     2.4     05-14    TPro  6.7  /  Alb  2.8<L>  /  TBili  0.4  /  DBili  x   /  AST  18  /  ALT  7   /  AlkPhos  74      MEDICATIONS  (STANDING):  aspirin  chewable 81 milliGRAM(s) Oral daily  atorvastatin 40 milliGRAM(s) Oral at bedtime  chlorhexidine 4% Liquid 1 Application(s) Topical <User Schedule>  chlorhexidine 4% Liquid 1 Application(s) Topical <User Schedule>  dextrose 40% Gel 15 Gram(s) Oral once  dextrose 5%. 1000 milliLiter(s) (50 mL/Hr) IV Continuous <Continuous>  dextrose 5%. 1000 milliLiter(s) (100 mL/Hr) IV Continuous <Continuous>  dextrose 50% Injectable 25 Gram(s) IV Push once  dextrose 50% Injectable 12.5 Gram(s) IV Push once  enoxaparin Injectable 90 milliGRAM(s) SubCutaneous daily  epoetin analilia-epbx (RETACRIT) Injectable 49630 Unit(s) IV Push <User Schedule>  glucagon  Injectable 1 milliGRAM(s) IntraMuscular once  insulin lispro (ADMELOG) corrective regimen sliding scale   SubCutaneous every 6 hours  melatonin 5 milliGRAM(s) Oral at bedtime  metoprolol tartrate 12.5 milliGRAM(s) Oral every 6 hours  midodrine. 5 milliGRAM(s) Oral three times a day  Nephro-debbie 1 Tablet(s) Oral daily  pantoprazole  Injectable 40 milliGRAM(s) IV Push daily  polyethylene glycol 3350 17 Gram(s) Oral daily  QUEtiapine 25 milliGRAM(s) Oral every 8 hours  senna 2 Tablet(s) Oral at bedtime    MEDICATIONS  (PRN):  acetaminophen    Suspension .. 650 milliGRAM(s) Oral every 6 hours PRN Temp greater or equal to 38C (100.4F), Mild Pain (1 - 3)  sodium chloride 0.9% lock flush 10 milliLiter(s) IV Push every 1 hour PRN Pre/post blood products, medications, blood draw, and to maintain line patency  sodium chloride 0.9% lock flush 10 milliLiter(s) IV Push every 1 hour PRN Pre/post blood products, medications, blood draw, and to maintain line patency    70 y/o female with PMHx CAD s/p stents '07, HTN, MI, PAF, liver abscess, s/p biliary stent with removal (11/2018; 7/2/19), chronic pancreatitis, DM2 on insulin, diabetic neuropathy, ESRD (5/2018) on HD (M/W/F since 5/18) presents to [facility] as a transfer from [place] after she went to HD with and had  an episode of syncope and change in mental status. HD RN reported she became unresponsive, L gaze preference, and was perseverating. Patient went for emergent CTH which revealed evolving L posterior cerebral artery infarct with new hemorrhage. Patient was subsequently intubated due to concern for deteriorating. Patient was then transferred to [facility].     Of note, patient was recently admitted to [place] on 4/12/21 for hypoxic/hypercapnic resp failure and hyperkalemia secondary to ESRD. While admitted pt was found to be confused, MRI obtained revealed large acute infarct of the left PCA. Pt was originally on Asprin which was changed to Plavix. Patient was discharged to rehab on Plavix.    Patient brought to NSICU and seen by nephro and continued on HD. Patient was extubated on 5/1. Patient seen by vascular for low flow out of the fistula and had a left femoral placed for HD. Patient downgraded to medical floor. Patients course complicated by multiple periods of pulling IV lines and the original left IJ. Family meeting attempted and family wants full code.       #Acute metabolic encephalopathy - secondary to multiple cvas in the past month  asa and statin   depakote stopped, seroquel started   neurology following  as per neurosurgery, repeat ct head on 5.11 to reassess cva - stable   ammonia, tsh WNL  - supportive care    #CVA - in setting of afib  - lovenox renally dosed started    #dm2 - elevated a1c 8.7  c.w ssi    #afib -   Continue metoprolol 12.5mg BID   no dig as per EP   lovenox renally dosed    #ESRD,     s/p left ij replaced, patient pulled out 5/10 overnight.  vascular placed another left IJ on 5.12.21,    prognosis poor   palliative following  - family wants full code, Family meeting had by previous hospitalist,    To Reinforce again,    Regroup w. Family ?    Unable to sustain Long term HD,    Consider Hospice care,    TY

## 2021-05-16 NOTE — PROGRESS NOTE ADULT - SUBJECTIVE AND OBJECTIVE BOX
IRENE TAYLOR    [id number]    71y      Female    INTERVAL HPI/OVERNIGHT EVENTS: patient being seen for esrd and cva. Patient pulled out her peripheral lines last night.     patient seen at bedside and is still confused    REVIEW OF SYSTEMS:    unable to obtain secondary to mental status       Vital Signs Last 24 Hrs  T(C): 36.8 (16 May 2021 09:53), Max: 36.8 (16 May 2021 09:53)  T(F): 98.3 (16 May 2021 09:53), Max: 98.3 (16 May 2021 09:53)  HR: 93 (16 May 2021 09:53) (64 - 125)  BP: 98/88 (16 May 2021 07:42) (81/28 - 98/88)  BP(mean): --  RR: 18 (16 May 2021 04:59) (17 - 18)  SpO2: 97% (16 May 2021 09:53) (97% - 99%)    PHYSICAL EXAM:    GENEAL: nad, restraints  HEAD:  Atraumatic, Normocephalic  NECK: Supple, No JVD, Normal thyroid  NERVOUS SYSTEM:  Awake  CHEST/LUNG: Clear to auscultation bilaterally  HEART: Regular rate and rhythm; No murmurs, rubs, or gallops  ABDOMEN: Soft, Nontender, Nondistended; Bowel sounds present  EXTREMITIES:  2+ Peripheral Pulses, No clubbing, cyanosis, or edema        LABS:    05-16    139  |  98  |  24.0<H>  ----------------------------<  116<H>  4.1   |  23.0  |  6.45<H>    Ca    8.8      16 May 2021 08:05  Mg     2.3     05-16    TPro  6.9  /  Alb  3.0<L>  /  TBili  0.4  /  DBili  x   /  AST  16  /  ALT  7   /  AlkPhos  77  05-16            MEDICATIONS  (STANDING):  aspirin  chewable 81 milliGRAM(s) Oral daily  atorvastatin 40 milliGRAM(s) Oral at bedtime  chlorhexidine 4% Liquid 1 Application(s) Topical <User Schedule>  chlorhexidine 4% Liquid 1 Application(s) Topical <User Schedule>  dextrose 40% Gel 15 Gram(s) Oral once  dextrose 5%. 1000 milliLiter(s) (50 mL/Hr) IV Continuous <Continuous>  dextrose 5%. 1000 milliLiter(s) (100 mL/Hr) IV Continuous <Continuous>  dextrose 50% Injectable 25 Gram(s) IV Push once  dextrose 50% Injectable 12.5 Gram(s) IV Push once  enoxaparin Injectable 90 milliGRAM(s) SubCutaneous daily  epoetin analilia-epbx (RETACRIT) Injectable 24212 Unit(s) IV Push <User Schedule>  glucagon  Injectable 1 milliGRAM(s) IntraMuscular once  insulin lispro (ADMELOG) corrective regimen sliding scale   SubCutaneous Before meals and at bedtime  melatonin 5 milliGRAM(s) Oral at bedtime  metoprolol tartrate 12.5 milliGRAM(s) Oral every 6 hours  midodrine. 15 milliGRAM(s) Oral three times a day  Nephro-debbie 1 Tablet(s) Oral daily  pantoprazole  Injectable 40 milliGRAM(s) IV Push daily  polyethylene glycol 3350 17 Gram(s) Oral daily  QUEtiapine 25 milliGRAM(s) Oral every 8 hours  senna 2 Tablet(s) Oral at bedtime    MEDICATIONS  (PRN):  acetaminophen    Suspension .. 650 milliGRAM(s) Oral every 6 hours PRN Temp greater or equal to 38C (100.4F), Mild Pain (1 - 3)  haloperidol    Injectable 2.5 milliGRAM(s) IntraMuscular every 8 hours PRN agitation  sodium chloride 0.9% lock flush 10 milliLiter(s) IV Push every 1 hour PRN Pre/post blood products, medications, blood draw, and to maintain line patency  sodium chloride 0.9% lock flush 10 milliLiter(s) IV Push every 1 hour PRN Pre/post blood products, medications, blood draw, and to maintain line patency      RADIOLOGY & ADDITIONAL TESTS:

## 2021-05-16 NOTE — PROGRESS NOTE ADULT - ASSESSMENT
Vital Signs Last 24 Hrs  T(C): 36.6 (17 May 2021 04:48), Max: 36.8 (16 May 2021 09:53)  T(F): 97.8 (17 May 2021 04:48), Max: 98.3 (16 May 2021 09:53)  HR: 113 (17 May 2021 04:48) (93 - 116)  BP: 103/69 (17 May 2021 04:48) (88/50 - 103/69)  RR: 20 (17 May 2021 04:48) (16 - 20)  SpO2: 91% (17 May 2021 04:48) (91% - 99%)    139    |  98     |  24.0<H>  ----------------------------<  116<H>  Ca:8.8   (16 May 2021 08:05)  4.1     |  23.0   |  6.45<H>    eGFR if Non : 6 *L*  eGFR if : 7 *L*    TPro  6.9    /  Alb  3.0<L>  /  TBili  0.4    /  DBili  x      /  AST  16     /  ALT  7      /  AlkPhos  77     16 May 2021 08:05    M.3 mg/dL  ( @ 08:05)    Hospice care,    Not a candidate for Long term HD ( Dementia )    Please call as needed, TY,

## 2021-05-17 ENCOUNTER — TRANSCRIPTION ENCOUNTER (OUTPATIENT)
Age: 71
End: 2021-05-17

## 2021-05-17 LAB
ALBUMIN SERPL ELPH-MCNC: 2.8 G/DL — LOW (ref 3.3–5.2)
ANION GAP SERPL CALC-SCNC: 19 MMOL/L — HIGH (ref 5–17)
BUN SERPL-MCNC: 46 MG/DL — HIGH (ref 8–20)
CALCIUM SERPL-MCNC: 8.9 MG/DL — SIGNIFICANT CHANGE UP (ref 8.6–10.2)
CHLORIDE SERPL-SCNC: 99 MMOL/L — SIGNIFICANT CHANGE UP (ref 98–107)
CO2 SERPL-SCNC: 20 MMOL/L — LOW (ref 22–29)
CREAT SERPL-MCNC: 7.42 MG/DL — HIGH (ref 0.5–1.3)
GLUCOSE BLDC GLUCOMTR-MCNC: 152 MG/DL — HIGH (ref 70–99)
GLUCOSE BLDC GLUCOMTR-MCNC: 157 MG/DL — HIGH (ref 70–99)
GLUCOSE BLDC GLUCOMTR-MCNC: 159 MG/DL — HIGH (ref 70–99)
GLUCOSE BLDC GLUCOMTR-MCNC: 167 MG/DL — HIGH (ref 70–99)
GLUCOSE SERPL-MCNC: 151 MG/DL — HIGH (ref 70–99)
HCT VFR BLD CALC: 31.9 % — LOW (ref 34.5–45)
HGB BLD-MCNC: 9.4 G/DL — LOW (ref 11.5–15.5)
MCHC RBC-ENTMCNC: 29.5 GM/DL — LOW (ref 32–36)
MCHC RBC-ENTMCNC: 29.9 PG — SIGNIFICANT CHANGE UP (ref 27–34)
MCV RBC AUTO: 101.6 FL — HIGH (ref 80–100)
PHOSPHATE SERPL-MCNC: 4.7 MG/DL — SIGNIFICANT CHANGE UP (ref 2.4–4.7)
PLATELET # BLD AUTO: 299 K/UL — SIGNIFICANT CHANGE UP (ref 150–400)
POTASSIUM SERPL-MCNC: 4.3 MMOL/L — SIGNIFICANT CHANGE UP (ref 3.5–5.3)
POTASSIUM SERPL-SCNC: 4.3 MMOL/L — SIGNIFICANT CHANGE UP (ref 3.5–5.3)
RBC # BLD: 3.14 M/UL — LOW (ref 3.8–5.2)
RBC # FLD: 19.9 % — HIGH (ref 10.3–14.5)
SODIUM SERPL-SCNC: 138 MMOL/L — SIGNIFICANT CHANGE UP (ref 135–145)
WBC # BLD: 8.46 K/UL — SIGNIFICANT CHANGE UP (ref 3.8–10.5)
WBC # FLD AUTO: 8.46 K/UL — SIGNIFICANT CHANGE UP (ref 3.8–10.5)

## 2021-05-17 PROCEDURE — 36556 INSERT NON-TUNNEL CV CATH: CPT

## 2021-05-17 PROCEDURE — 99233 SBSQ HOSP IP/OBS HIGH 50: CPT

## 2021-05-17 PROCEDURE — 76937 US GUIDE VASCULAR ACCESS: CPT | Mod: 26

## 2021-05-17 PROCEDURE — 99232 SBSQ HOSP IP/OBS MODERATE 35: CPT

## 2021-05-17 RX ORDER — QUETIAPINE FUMARATE 200 MG/1
50 TABLET, FILM COATED ORAL EVERY 8 HOURS
Refills: 0 | Status: DISCONTINUED | OUTPATIENT
Start: 2021-05-17 | End: 2021-05-23

## 2021-05-17 RX ORDER — ENOXAPARIN SODIUM 100 MG/ML
90 INJECTION SUBCUTANEOUS ONCE
Refills: 0 | Status: COMPLETED | OUTPATIENT
Start: 2021-05-17 | End: 2021-05-17

## 2021-05-17 RX ADMIN — Medication 12.5 MILLIGRAM(S): at 12:13

## 2021-05-17 RX ADMIN — HALOPERIDOL DECANOATE 2.5 MILLIGRAM(S): 100 INJECTION INTRAMUSCULAR at 01:03

## 2021-05-17 RX ADMIN — Medication 12.5 MILLIGRAM(S): at 18:30

## 2021-05-17 RX ADMIN — QUETIAPINE FUMARATE 50 MILLIGRAM(S): 200 TABLET, FILM COATED ORAL at 15:14

## 2021-05-17 RX ADMIN — Medication 12.5 MILLIGRAM(S): at 23:18

## 2021-05-17 RX ADMIN — ENOXAPARIN SODIUM 90 MILLIGRAM(S): 100 INJECTION SUBCUTANEOUS at 12:13

## 2021-05-17 RX ADMIN — MIDODRINE HYDROCHLORIDE 15 MILLIGRAM(S): 2.5 TABLET ORAL at 16:18

## 2021-05-17 RX ADMIN — Medication 81 MILLIGRAM(S): at 12:12

## 2021-05-17 RX ADMIN — Medication 2: at 12:17

## 2021-05-17 RX ADMIN — Medication 2: at 23:17

## 2021-05-17 RX ADMIN — Medication 12.5 MILLIGRAM(S): at 06:31

## 2021-05-17 RX ADMIN — QUETIAPINE FUMARATE 50 MILLIGRAM(S): 200 TABLET, FILM COATED ORAL at 23:18

## 2021-05-17 RX ADMIN — CHLORHEXIDINE GLUCONATE 1 APPLICATION(S): 213 SOLUTION TOPICAL at 06:28

## 2021-05-17 RX ADMIN — SENNA PLUS 2 TABLET(S): 8.6 TABLET ORAL at 23:20

## 2021-05-17 RX ADMIN — MIDODRINE HYDROCHLORIDE 15 MILLIGRAM(S): 2.5 TABLET ORAL at 12:12

## 2021-05-17 RX ADMIN — PANTOPRAZOLE SODIUM 40 MILLIGRAM(S): 20 TABLET, DELAYED RELEASE ORAL at 12:12

## 2021-05-17 RX ADMIN — MIDODRINE HYDROCHLORIDE 15 MILLIGRAM(S): 2.5 TABLET ORAL at 06:31

## 2021-05-17 RX ADMIN — Medication 1 TABLET(S): at 12:12

## 2021-05-17 RX ADMIN — QUETIAPINE FUMARATE 25 MILLIGRAM(S): 200 TABLET, FILM COATED ORAL at 06:31

## 2021-05-17 RX ADMIN — ATORVASTATIN CALCIUM 40 MILLIGRAM(S): 80 TABLET, FILM COATED ORAL at 23:18

## 2021-05-17 RX ADMIN — Medication 2: at 16:20

## 2021-05-17 RX ADMIN — Medication 5 MILLIGRAM(S): at 23:18

## 2021-05-17 RX ADMIN — Medication 2: at 08:03

## 2021-05-17 NOTE — PROVIDER CONTACT NOTE (OTHER) - SITUATION
Chart reviewed, contents noted. Pt with multiple events overnight, requiring CPR and reintubation. Pt not seen for PT at this time
PA made aware pt pulled out NGT and LIJ.
Went to check on pt , and pt was found with one arm out of her wrist restraint and pulled out her midline
md made aware pt pulled out LIJ

## 2021-05-17 NOTE — PROGRESS NOTE ADULT - SUBJECTIVE AND OBJECTIVE BOX
Patient is calm.  Does not significantly fight .  Continues to have restraints.   Pulled out her IJs again.    REVIEW OF SYSTEMS  Constitutional - No fever,  +fatigue  Neurological - +loss of strength    FUNCTIONAL PROGRESS  5/15  Progress Summary: Chart reviewed. Follow up to check pt's tolerance of diet upgrade to puree/nectar liquids. Pt d/w NEGRO Thomas who reports pt's PO intake for dinner yesterday and breakfast today has been minimal and therefore unable to gauge tolerance. This service to continue to follow x1. NEGRO Thomas aware.       5/12  Bed Mobility: Sit to Supine:     · Level of [place]	unable to perform    Bed Mobility: Supine to Sit:     · Level of [place]	unable to perform; pt resistant to mobility due to confusion    Transfer: Bed to Chair:    Bed to Chair Transfer:    Transfer Skill: Bed to Chair   · Level of [place]	unable to perform    Transfer: Sit to Stand:     · Level of [place]	unable to perform    Transfer: Stand to Sit:     · Level of [place]	unable to perform    Transfer: Toilet Transfer:     · Level of [place]	unable to perform  Bathing Training:     · Level of [place]	dependent (less than 25% patients effort)    Upper Body Dressing Training:     · Level of [place]	dependent (less than 25% patients effort); secondary to confusion    Lower Body Dressing Training:     · Level of [place]	dependent (less than 25% patients effort)    Toilet Hygiene Training:     · Level of [place]	dependent (less than 25% patients effort); secondary to prima fit    Grooming Training:     · Level of [place]	maximum assist (25% patients effort)    Eating/Self-Feeding Training:     · Level of [place]	maximum assist (25% patients effort)      VITALS  T(C): 36.4 (05-17-21 @ 09:23), Max: 36.8 (05-16-21 @ 21:44)  HR: 98 (05-17-21 @ 09:23) (98 - 116)  BP: 98/62 (05-17-21 @ 09:23) (88/50 - 103/69)  RR: 16 (05-17-21 @ 09:23) (16 - 20)  SpO2: 96% (05-17-21 @ 09:23) (91% - 99%)  Wt(kg): --    MEDICATIONS   acetaminophen    Suspension .. 650 milliGRAM(s) every 6 hours PRN  aspirin  chewable 81 milliGRAM(s) daily  atorvastatin 40 milliGRAM(s) at bedtime  chlorhexidine 4% Liquid 1 Application(s) <User Schedule>  chlorhexidine 4% Liquid 1 Application(s) <User Schedule>  dextrose 40% Gel 15 Gram(s) once  dextrose 5%. 1000 milliLiter(s) <Continuous>  dextrose 5%. 1000 milliLiter(s) <Continuous>  dextrose 50% Injectable 25 Gram(s) once  dextrose 50% Injectable 12.5 Gram(s) once  enoxaparin Injectable 90 milliGRAM(s) once  epoetin analilia-epbx (RETACRIT) Injectable 66773 Unit(s) <User Schedule>  glucagon  Injectable 1 milliGRAM(s) once  haloperidol    Injectable 2.5 milliGRAM(s) every 8 hours PRN  insulin lispro (ADMELOG) corrective regimen sliding scale   Before meals and at bedtime  melatonin 5 milliGRAM(s) at bedtime  metoprolol tartrate 12.5 milliGRAM(s) every 6 hours  midodrine. 15 milliGRAM(s) three times a day  Nephro-debbie 1 Tablet(s) daily  pantoprazole  Injectable 40 milliGRAM(s) daily  polyethylene glycol 3350 17 Gram(s) daily  QUEtiapine 25 milliGRAM(s) every 8 hours  senna 2 Tablet(s) at bedtime  sodium chloride 0.9% lock flush 10 milliLiter(s) every 1 hour PRN  sodium chloride 0.9% lock flush 10 milliLiter(s) every 1 hour PRN      RECENT LABS/IMAGING                          9.4    8.46  )-----------( 299      ( 17 May 2021 08:19 )             31.9     05-17    138  |  99  |  46.0<H>  ----------------------------<  151<H>  4.3   |  20.0<L>  |  7.42<H>    Ca    8.9      17 May 2021 08:19  Phos  4.7     05-17  Mg     2.3     05-16    TPro  x   /  Alb  2.8<L>  /  TBili  x   /  DBili  x   /  AST  x   /  ALT  x   /  AlkPhos  x   05-17                    HEAD CT 5/3 - An evolving left-sided PCA distribution infarction is again seen with areas of gyral hyperattenuation. Findings may indicate superimposed petechial hemorrhagic transformation versus cortical laminar necrosis versus a combination of both. No new areas of acute intracranial hemorrhage are seen. Multiple small chronic infarcts are noted within the right cerebellar hemisphere. There is no hydrocephalus. There is diffuse cerebral volume loss with prominence of the sulci, fissures, and cisternal spaces which is normal for the patient's age. There is mild periventricular white matter hypoattenuation statistically compatible with microvascular changes given calcific atherosclerotic disease of the intracranial arteries. The paranasal sinuses and mastoid air cells are clear. The calvarium appears intact. The orbits appear unremarkable apart from cataract removal.    HEAD CT 5/11 - No significant interval change from 5/3/2021.    CXR 5/12 - Left IJ line with the tip in the right atrium and no pneumothorax, new. Mild, central pulmonary venous congestion, grossly unchanged  ----------------------------------------------------------------------------------------  PHYSICAL EXAM  Constitutional - NAD, Appears comfortable  Extremities - Bilateral wrist restraints/Mittens  Neurologic Exam -                    Cognitive - Awake/Alert     Communication - None     Motor - Unable to assess   Psychiatric - Calm   ------------------------------------------------------------------------------------------------  ASSESSMENT/PLAN  71yFemale with functional deficits after developing an acute CVA  Left PCA CVA with hemorrhagic conversion - ASA, Lipitor  HTN - Lopressor  HypoTN - Midodrine  CRF - HD  Mood/Delirium - DC Depakote (5/14), Seroquel 50mg Q8 (increased from 75/day 5/17), Melatonin 5mg HS (5/12), Haldol PRN  Pain - Tylenol  Oropharyngeal Dysphagia - Puree/Honey    DVT PPX - SCDs, Lovenox  Rehab - Family wants to continue aggressive treatment. Given patient's functional status and ability to participate, recommend LTCF. Patient continues to be on restraints, above recommendations are to assist with behavior and eventual DC of restraints which are impeding her potential for DC.     Continue mobilization by staff to maintain cardiopulmonary function and prevention of secondary complications related to debility.     Discussed with rehab team.

## 2021-05-17 NOTE — PROGRESS NOTE ADULT - ASSESSMENT
#ESRD,     s/p left ij replaced, patient pulled out 5/10 overnight.  vascular placed another left IJ on 5.12.21,    prognosis poor   palliative following  - family wants full code, Family meeting had by previous hospitalist,  Likely unable to tolerate long term HD (AVF issues; dementia; pulls lines)  Vascular assessing fistula in AM; no emergent need for HD today;     melinda Bob

## 2021-05-17 NOTE — PROGRESS NOTE ADULT - SUBJECTIVE AND OBJECTIVE BOX
IRENE TAYLOR    [id number]    71y      Female    INTERVAL HPI/OVERNIGHT EVENTS: patient being seen for cva and esrd. patient seen at bedside and is confused    REVIEW OF SYSTEMS:    unable to obtains econdary to mental status     Vital Signs Last 24 Hrs  T(C): 36.4 (17 May 2021 09:23), Max: 36.8 (16 May 2021 21:44)  T(F): 97.6 (17 May 2021 09:23), Max: 98.2 (16 May 2021 21:44)  HR: 98 (17 May 2021 09:23) (98 - 116)  BP: 98/62 (17 May 2021 09:23) (88/50 - 103/69)  BP(mean): --  RR: 16 (17 May 2021 09:23) (16 - 20)  SpO2: 96% (17 May 2021 09:23) (91% - 99%)    PHYSICAL EXAM:    GENEAL: restraints, confused  HEAD:  Atraumatic, Normocephalic  NECK: Supple, No JVD, Normal thyroid  NERVOUS SYSTEM:  Awake  CHEST/LUNG: Clear to auscultation bilaterally  HEART: Regular rate and rhythm; No murmurs, rubs, or gallops  ABDOMEN: Soft, Nontender, Nondistended; Bowel sounds present  EXTREMITIES:  2+ Peripheral Pulses, No clubbing, cyanosis, or edema        LABS:                        9.4    8.46  )-----------( 299      ( 17 May 2021 08:19 )             31.9     05-17    138  |  99  |  46.0<H>  ----------------------------<  151<H>  4.3   |  20.0<L>  |  7.42<H>    Ca    8.9      17 May 2021 08:19  Phos  4.7     05-17  Mg     2.3     05-16    TPro  x   /  Alb  2.8<L>  /  TBili  x   /  DBili  x   /  AST  x   /  ALT  x   /  AlkPhos  x   05-17            MEDICATIONS  (STANDING):  aspirin  chewable 81 milliGRAM(s) Oral daily  atorvastatin 40 milliGRAM(s) Oral at bedtime  chlorhexidine 4% Liquid 1 Application(s) Topical <User Schedule>  chlorhexidine 4% Liquid 1 Application(s) Topical <User Schedule>  dextrose 40% Gel 15 Gram(s) Oral once  dextrose 5%. 1000 milliLiter(s) (50 mL/Hr) IV Continuous <Continuous>  dextrose 5%. 1000 milliLiter(s) (100 mL/Hr) IV Continuous <Continuous>  dextrose 50% Injectable 25 Gram(s) IV Push once  dextrose 50% Injectable 12.5 Gram(s) IV Push once  enoxaparin Injectable 90 milliGRAM(s) SubCutaneous once  epoetin analilia-epbx (RETACRIT) Injectable 61494 Unit(s) IV Push <User Schedule>  glucagon  Injectable 1 milliGRAM(s) IntraMuscular once  insulin lispro (ADMELOG) corrective regimen sliding scale   SubCutaneous Before meals and at bedtime  melatonin 5 milliGRAM(s) Oral at bedtime  metoprolol tartrate 12.5 milliGRAM(s) Oral every 6 hours  midodrine. 15 milliGRAM(s) Oral three times a day  Nephro-debbie 1 Tablet(s) Oral daily  pantoprazole  Injectable 40 milliGRAM(s) IV Push daily  polyethylene glycol 3350 17 Gram(s) Oral daily  QUEtiapine 50 milliGRAM(s) Oral every 8 hours  senna 2 Tablet(s) Oral at bedtime    MEDICATIONS  (PRN):  acetaminophen    Suspension .. 650 milliGRAM(s) Oral every 6 hours PRN Temp greater or equal to 38C (100.4F), Mild Pain (1 - 3)  haloperidol    Injectable 2.5 milliGRAM(s) IntraMuscular every 8 hours PRN agitation  sodium chloride 0.9% lock flush 10 milliLiter(s) IV Push every 1 hour PRN Pre/post blood products, medications, blood draw, and to maintain line patency  sodium chloride 0.9% lock flush 10 milliLiter(s) IV Push every 1 hour PRN Pre/post blood products, medications, blood draw, and to maintain line patency      RADIOLOGY & ADDITIONAL TESTS:

## 2021-05-17 NOTE — PROGRESS NOTE ADULT - ATTENDING COMMENTS
seen and examined, agree with above
Patient seen and examined by me.  I have discussed my recommendation with the PA which are outlined above.  Will follow.
Seen and examined  For temporary HD catheter placement today
Patient evaluated at the bedside. L forearm no thrill or bruit. Will re evaluate after she recovers. Use non tunneled cath for now.
Patient seen and examined by me.  I have discussed my recommendation with the PA which are outlined above.  Patient is on NG feeds. She is lethargic    Overall patients prognosis is poor.    Will sign off.    Discussed with Dr Ned Bob
late entry...seen that day with NP.    cva, esrd  pulled out all of her lines ( hd catheter and Ng tube)  diet reinstated per speech eval  family meeting
recurrent Cva   dysphagia, NG tube  esrd on hd  catheter replaced as nonfunctioning per nephro/vascular surgery      continue current management  palliative following
seen and examined, agree with above
-RRT for Afib RVR 130s last night with hypotension SBP 80s, started on midodrine  -discontinue lisinopril  -trial of low dose metoprolol 12.5mg BID and then uptitrate if BP tolerates  -recent hemorrhagic conversion of CVA not able to start anticoagulation limiting use of Amiodarone for rate control, also ESRD not ideal for digoxin use, but if recurrent Afib RVR with hypotension can give IVP digoxin 0.5mg x1  -continue ASA/statin  -no cardiac contraindication for temporary HD catheter placement  -overall prognosis guarded, continue to address goals of care      Khoi De La Rosa DO, [facility]  Faculty Non-Invasive Cardiologist  [phone]
awaiting patient optimization for fistulogram

## 2021-05-17 NOTE — PROVIDER CONTACT NOTE (OTHER) - ACTION/TREATMENT ORDERED:
UBALDO Choi removed sutures to IJ pulled out.
As per UBALDO Nagy, Will try to get a new midline tomorrow.
PA will pass along to day shift. Sutures to shiley removed

## 2021-05-17 NOTE — PROVIDER CONTACT NOTE (OTHER) - REASON
PT evaluation
Pt pulled out NGT
Pt pulled out L IJ
Pt slipped out of one of the wrist restraints and pulled out her midline

## 2021-05-17 NOTE — PROGRESS NOTE ADULT - SUBJECTIVE AND OBJECTIVE BOX
HPI/OVERNIGHT EVENTS:  No acute overnight events. Vital signs stable with persisting mild tachcardia. Seen in bed in NAD. Tolerating a diet. Self-removed SABINE Hogan on 5/14. Plan for Fistulogram tomorrow.    MEDICATIONS  (STANDING):  aspirin  chewable 81 milliGRAM(s) Oral daily  atorvastatin 40 milliGRAM(s) Oral at bedtime  chlorhexidine 4% Liquid 1 Application(s) Topical <User Schedule>  chlorhexidine 4% Liquid 1 Application(s) Topical <User Schedule>  dextrose 40% Gel 15 Gram(s) Oral once  dextrose 5%. 1000 milliLiter(s) (50 mL/Hr) IV Continuous <Continuous>  dextrose 5%. 1000 milliLiter(s) (100 mL/Hr) IV Continuous <Continuous>  dextrose 50% Injectable 25 Gram(s) IV Push once  dextrose 50% Injectable 12.5 Gram(s) IV Push once  enoxaparin Injectable 90 milliGRAM(s) SubCutaneous once  epoetin analilia-epbx (RETACRIT) Injectable 49803 Unit(s) IV Push <User Schedule>  glucagon  Injectable 1 milliGRAM(s) IntraMuscular once  insulin lispro (ADMELOG) corrective regimen sliding scale   SubCutaneous Before meals and at bedtime  melatonin 5 milliGRAM(s) Oral at bedtime  metoprolol tartrate 12.5 milliGRAM(s) Oral every 6 hours  midodrine. 15 milliGRAM(s) Oral three times a day  Nephro-debbie 1 Tablet(s) Oral daily  pantoprazole  Injectable 40 milliGRAM(s) IV Push daily  polyethylene glycol 3350 17 Gram(s) Oral daily  QUEtiapine 25 milliGRAM(s) Oral every 8 hours  senna 2 Tablet(s) Oral at bedtime    MEDICATIONS  (PRN):  acetaminophen    Suspension .. 650 milliGRAM(s) Oral every 6 hours PRN Temp greater or equal to 38C (100.4F), Mild Pain (1 - 3)  haloperidol    Injectable 2.5 milliGRAM(s) IntraMuscular every 8 hours PRN agitation  sodium chloride 0.9% lock flush 10 milliLiter(s) IV Push every 1 hour PRN Pre/post blood products, medications, blood draw, and to maintain line patency  sodium chloride 0.9% lock flush 10 milliLiter(s) IV Push every 1 hour PRN Pre/post blood products, medications, blood draw, and to maintain line patency      Vital Signs Last 24 Hrs  T(C): 36.6 (17 May 2021 04:48), Max: 36.8 (16 May 2021 09:53)  T(F): 97.8 (17 May 2021 04:48), Max: 98.3 (16 May 2021 09:53)  HR: 113 (17 May 2021 04:48) (93 - 116)  BP: 103/69 (17 May 2021 04:48) (88/50 - 103/69)  BP(mean): --  RR: 20 (17 May 2021 04:48) (16 - 20)  SpO2: 91% (17 May 2021 04:48) (91% - 99%)    Constitutional: patient laying comfortably in bed, in no acute distress  HEENT: EOMI, no active drainage or redness  Neck: Full ROM without pain, LIJ shiley insertion site without signs of infection or active bleeding.   Respiratory: respirations are unlabored, no accessory muscle use  Cardiovascular: regular rhythm, rapid rate  Gastrointestinal: Abdomen soft, non-tender, non-distended  Neurological:  no gross sensory / motor / coordination deficits  Extremities: LUE with radiocephalic AVF, palpable thrill. WWP distally.       I&O's Detail      LABS:    05-16    139  |  98  |  24.0<H>  ----------------------------<  116<H>  4.1   |  23.0  |  6.45<H>    Ca    8.8      16 May 2021 08:05  Mg     2.3     05-16    TPro  6.9  /  Alb  3.0<L>  /  TBili  0.4  /  DBili  x   /  AST  16  /  ALT  7   /  AlkPhos  77  05-16         HPI/OVERNIGHT EVENTS:  No acute overnight events. Vital signs stable with persisting mild tachycardia. Seen in bed in NAD. Tolerating a diet. Self-removed LIJ Shiley on 5/14. Plan for Fistulogram tomorrow. Not cooperative with evaluation of neck at former shiley insertion site.     MEDICATIONS  (STANDING):  aspirin  chewable 81 milliGRAM(s) Oral daily  atorvastatin 40 milliGRAM(s) Oral at bedtime  chlorhexidine 4% Liquid 1 Application(s) Topical <User Schedule>  chlorhexidine 4% Liquid 1 Application(s) Topical <User Schedule>  dextrose 40% Gel 15 Gram(s) Oral once  dextrose 5%. 1000 milliLiter(s) (50 mL/Hr) IV Continuous <Continuous>  dextrose 5%. 1000 milliLiter(s) (100 mL/Hr) IV Continuous <Continuous>  dextrose 50% Injectable 25 Gram(s) IV Push once  dextrose 50% Injectable 12.5 Gram(s) IV Push once  enoxaparin Injectable 90 milliGRAM(s) SubCutaneous once  epoetin analilia-epbx (RETACRIT) Injectable 16764 Unit(s) IV Push <User Schedule>  glucagon  Injectable 1 milliGRAM(s) IntraMuscular once  insulin lispro (ADMELOG) corrective regimen sliding scale   SubCutaneous Before meals and at bedtime  melatonin 5 milliGRAM(s) Oral at bedtime  metoprolol tartrate 12.5 milliGRAM(s) Oral every 6 hours  midodrine. 15 milliGRAM(s) Oral three times a day  Nephro-debbie 1 Tablet(s) Oral daily  pantoprazole  Injectable 40 milliGRAM(s) IV Push daily  polyethylene glycol 3350 17 Gram(s) Oral daily  QUEtiapine 25 milliGRAM(s) Oral every 8 hours  senna 2 Tablet(s) Oral at bedtime    MEDICATIONS  (PRN):  acetaminophen    Suspension .. 650 milliGRAM(s) Oral every 6 hours PRN Temp greater or equal to 38C (100.4F), Mild Pain (1 - 3)  haloperidol    Injectable 2.5 milliGRAM(s) IntraMuscular every 8 hours PRN agitation  sodium chloride 0.9% lock flush 10 milliLiter(s) IV Push every 1 hour PRN Pre/post blood products, medications, blood draw, and to maintain line patency  sodium chloride 0.9% lock flush 10 milliLiter(s) IV Push every 1 hour PRN Pre/post blood products, medications, blood draw, and to maintain line patency      Vital Signs Last 24 Hrs  T(C): 36.6 (17 May 2021 04:48), Max: 36.8 (16 May 2021 09:53)  T(F): 97.8 (17 May 2021 04:48), Max: 98.3 (16 May 2021 09:53)  HR: 113 (17 May 2021 04:48) (93 - 116)  BP: 103/69 (17 May 2021 04:48) (88/50 - 103/69)  BP(mean): --  RR: 20 (17 May 2021 04:48) (16 - 20)  SpO2: 91% (17 May 2021 04:48) (91% - 99%)    Constitutional: patient laying comfortably in bed, in no acute distress  HEENT: EOMI, no active drainage or redness  Neck: Full ROM without pain, LIJ shiley insertion site without signs of infection or active bleeding. Hardness appreciated, possible mass but evaluation incomplete d/t uncooperativeness    Respiratory: respirations are unlabored, no accessory muscle use  Cardiovascular: regular rhythm, rapid rate  Gastrointestinal: Abdomen soft, non-tender, non-distended  Neurological:  no gross sensory / motor / coordination deficits  Extremities: LUE with radiocephalic AVF, no appreciable thrill. WWP distally. Radial 2+       I&O's Detail      LABS:    05-16    139  |  98  |  24.0<H>  ----------------------------<  116<H>  4.1   |  23.0  |  6.45<H>    Ca    8.8      16 May 2021 08:05  Mg     2.3     05-16    TPro  6.9  /  Alb  3.0<L>  /  TBili  0.4  /  DBili  x   /  AST  16  /  ALT  7   /  AlkPhos  77  05-16

## 2021-05-17 NOTE — PROGRESS NOTE ADULT - ASSESSMENT
72 y/o F w/PMH CAD, DM, ESRD on HD, s/p cerebral hemorrhagic infarct, with LUE AVF occlusion at proximal cephalic outflow was admitted to NeuroICU now more stable on floor. Self-removed SABINE Garsialey twice, recently on 5/14. Plan for fistulogram or LUE AVF.      - Fistulogram with possible intervention tomorrow, 5/18  - Please make patient NPO tonight  - obtain morning labs to include, type & screen and coags  - Hold Therapeutic Lovenox dose tonight 5/17 PM and tomorrow morning 5/18 AM  - Remainder of care per primary.  70 y/o F w/PMH CAD, DM, ESRD on HD, s/p cerebral hemorrhagic infarct, with LUE AVF occlusion at proximal cephalic outflow was admitted to NeuroICU now more stable on floor. Self-removed SABINE Hogan twice, recently on 5/14. Plan for fistulogram or LUE AVF.      - Fistulogram with possible intervention tomorrow, 5/18  - Please make patient NPO tonight  - obtain morning labs to include, type & screen and coags  - Hold Therapeutic Lovenox dose tonight 5/17 PM and tomorrow morning 5/18 AM  - Continue protecting LUE- PINK BAND TO BE REPLACED.   - Remainder of care per primary.

## 2021-05-17 NOTE — PROGRESS NOTE ADULT - ASSESSMENT
72 y/o female with PMHx CAD s/p stents '07, HTN, MI, PAF, liver abscess, s/p biliary stent with removal (11/2018; 7/2/19), chronic pancreatitis, DM2 on insulin, diabetic neuropathy, ESRD (5/2018) on HD (M/W/F since 5/18) presents to [facility] as a transfer from [place] after she went to HD with and had  an episode of syncope and change in mental status. HD RN reported she became unresponsive, L gaze preference, and was perseverating. Patient went for emergent CTH which revealed evolving L posterior cerebral artery infarct with new hemorrhage. Patient was subsequently intubated due to concern for deteriorating. Patient was then transferred to [facility].     Of note, patient was recently admitted to [place] on 4/12/21 for hypoxic/hypercapnic resp failure and hyperkalemia secondary to ESRD. While admitted pt was found to be confused, MRI obtained revealed large acute infarct of the left PCA. Pt was originally on Asprin which was changed to Plavix. Patient was discharged to rehab on Plavix.    Patient brought to NSICU and seen by nephro and continued on HD. Patient was extubated on 5/1. Patient seen by vascular for low flow out of the fistula and had a left femoral placed for HD. Patient downgraded to medical floor. Patients course complicated by multiple periods of pulling IV lines and the original left IJ. Family meeting attempted and family wants full code.      patient pulled her second left IJ on 5.14  patient also pulled her peripheral lines   5.17 midline placed today     #Acute metabolic encephalopathy in setting of dementia - secondary to multiple cvas in the past month  asa and statin   depakote stopped, c/w seroquel   neurology following  ct head repeated on 5.11 - stable cva  ammonia, tsh WNL  - supportive care  agree with increasing seroquel     #CVA - in setting of afib  - lovenox renally dosed started, hold tomorrows dose    #dm2 - elevated a1c 8.7  c.w ssi    #afib -   Continue metoprolol 12.5mg qid  no dig as per EP   lovenox    #esrd - renal following   c.w HD  vascular following - spoke to vascular, fistulogram tomorrow   patient pulled her second ij   npo pmn     prognosis poor   palliative following  - family wants full code, Family meeting had by previous hospitalist  eventual CURRY this week post fistulogram

## 2021-05-17 NOTE — PROGRESS NOTE ADULT - SUBJECTIVE AND OBJECTIVE BOX
[facility] DIVISION OF KIDNEY DISEASES AND HYPERTENSION -- FOLLOW UP NOTE  --------------------------------------------------------------------------------  Chief Complaint:  ESRD HD  24 hour events/subjective:  Seen/examined  Confused/demented at baseline  No access at current;      PAST HISTORY  --------------------------------------------------------------------------------  No significant changes to PMH, PSH, FHx, SHx, unless otherwise noted    ALLERGIES & MEDICATIONS  --------------------------------------------------------------------------------  Allergies    ertapenem (Urticaria)  Purell (Rash)    Intolerances      Standing Inpatient Medications  aspirin  chewable 81 milliGRAM(s) Oral daily  atorvastatin 40 milliGRAM(s) Oral at bedtime  chlorhexidine 4% Liquid 1 Application(s) Topical <User Schedule>  chlorhexidine 4% Liquid 1 Application(s) Topical <User Schedule>  dextrose 40% Gel 15 Gram(s) Oral once  dextrose 5%. 1000 milliLiter(s) IV Continuous <Continuous>  dextrose 5%. 1000 milliLiter(s) IV Continuous <Continuous>  dextrose 50% Injectable 25 Gram(s) IV Push once  dextrose 50% Injectable 12.5 Gram(s) IV Push once  epoetin analilia-epbx (RETACRIT) Injectable 87550 Unit(s) IV Push <User Schedule>  glucagon  Injectable 1 milliGRAM(s) IntraMuscular once  insulin lispro (ADMELOG) corrective regimen sliding scale   SubCutaneous Before meals and at bedtime  melatonin 5 milliGRAM(s) Oral at bedtime  metoprolol tartrate 12.5 milliGRAM(s) Oral every 6 hours  midodrine. 15 milliGRAM(s) Oral three times a day  Nephro-debbie 1 Tablet(s) Oral daily  pantoprazole  Injectable 40 milliGRAM(s) IV Push daily  polyethylene glycol 3350 17 Gram(s) Oral daily  QUEtiapine 50 milliGRAM(s) Oral every 8 hours  senna 2 Tablet(s) Oral at bedtime    PRN Inpatient Medications  acetaminophen    Suspension .. 650 milliGRAM(s) Oral every 6 hours PRN  haloperidol    Injectable 2.5 milliGRAM(s) IntraMuscular every 8 hours PRN  sodium chloride 0.9% lock flush 10 milliLiter(s) IV Push every 1 hour PRN  sodium chloride 0.9% lock flush 10 milliLiter(s) IV Push every 1 hour PRN      REVIEW OF SYSTEMS  --------------------------------------------------------------------------------  Unable to obtain    VITALS/PHYSICAL EXAM  --------------------------------------------------------------------------------  T(C): 36.4 (05-17-21 @ 09:23), Max: 36.8 (05-16-21 @ 21:44)  HR: 98 (05-17-21 @ 09:23) (98 - 113)  BP: 98/62 (05-17-21 @ 09:23) (88/50 - 103/69)  RR: 16 (05-17-21 @ 09:23) (16 - 20)  SpO2: 96% (05-17-21 @ 09:23) (91% - 99%)  Wt(kg): --        Physical Exam:  GENERAL: NAD  HEAD:  Atraumatic, Normocephalic  NECK: Supple, No JVD, Normal thyroid  NERVOUS SYSTEM:  Awake ; confused  CHEST/LUNG: Clear to auscultation bilaterally  HEART: Regular rate and rhythm; No murmurs, rubs, or gallops  ABDOMEN: Soft, Nontender, Nondistended; Bowel sounds present  EXTREMITIES:  2+ Peripheral Pulses, No clubbing, cyanosis, or edema    LABS/STUDIES  --------------------------------------------------------------------------------              9.4    8.46  >-----------<  299      [05-17-21 @ 08:19]              31.9     138  |  99  |  46.0  ----------------------------<  151      [05-17-21 @ 08:19]  4.3   |  20.0  |  7.42        Ca     8.9     [05-17-21 @ 08:19]      Mg     2.3     [05-16-21 @ 08:05]      Phos  4.7     [05-17-21 @ 08:19]    TPro  x   /  Alb  2.8  /  TBili  x   /  DBili  x   /  AST  x   /  ALT  x   /  AlkPhos  x   [05-17-21 @ 08:19]          Creatinine Trend:  SCr 7.42 [05-17 @ 08:19]  SCr 6.45 [05-16 @ 08:05]  SCr 4.67 [05-15 @ 08:32]  SCr 6.55 [05-14 @ 07:50]  SCr 4.43 [05-13 @ 07:51]        HbA1c 9.4      [02-07-20 @ 21:14]  TSH 1.15      [04-14-21 @ 08:13]  Lipid: chol 106, , HDL 40, LDL --      [04-30-21 @ 04:55]    HBsAb <3.0      [05-11-21 @ 00:04]  HBsAg Nonreact      [05-11-21 @ 00:04]  HBcAb Nonreact      [05-11-21 @ 00:04]  HCV 0.16, Nonreact      [05-11-21 @ 00:04]

## 2021-05-17 NOTE — DISCHARGE NOTE NURSING/CASE MANAGEMENT/SOCIAL WORK - PATIENT PORTAL LINK FT
You can access the FollowMyHealth Patient Portal offered by [facility] by registering at the following website: [email or web address]. By joining [employer]’s FollowMyHealth portal, you will also be able to view your health information using other applications (apps) compatible with our system.

## 2021-05-18 ENCOUNTER — APPOINTMENT (OUTPATIENT)
Dept: VASCULAR SURGERY | Facility: HOSPITAL | Age: 71
End: 2021-05-18

## 2021-05-18 LAB
ALBUMIN SERPL ELPH-MCNC: 2.8 G/DL — LOW (ref 3.3–5.2)
ALP SERPL-CCNC: 73 U/L — SIGNIFICANT CHANGE UP (ref 40–120)
ALT FLD-CCNC: 9 U/L — SIGNIFICANT CHANGE UP
ANION GAP SERPL CALC-SCNC: 20 MMOL/L — HIGH (ref 5–17)
APTT BLD: 27.4 SEC — LOW (ref 27.5–35.5)
AST SERPL-CCNC: 36 U/L — HIGH
BILIRUB SERPL-MCNC: 0.4 MG/DL — SIGNIFICANT CHANGE UP (ref 0.4–2)
BLD GP AB SCN SERPL QL: SIGNIFICANT CHANGE UP
BUN SERPL-MCNC: 57 MG/DL — HIGH (ref 8–20)
CALCIUM SERPL-MCNC: 8.6 MG/DL — SIGNIFICANT CHANGE UP (ref 8.6–10.2)
CHLORIDE SERPL-SCNC: 98 MMOL/L — SIGNIFICANT CHANGE UP (ref 98–107)
CO2 SERPL-SCNC: 19 MMOL/L — LOW (ref 22–29)
CREAT SERPL-MCNC: 8.77 MG/DL — HIGH (ref 0.5–1.3)
GLUCOSE BLDC GLUCOMTR-MCNC: 135 MG/DL — HIGH (ref 70–99)
GLUCOSE BLDC GLUCOMTR-MCNC: 142 MG/DL — HIGH (ref 70–99)
GLUCOSE BLDC GLUCOMTR-MCNC: 150 MG/DL — HIGH (ref 70–99)
GLUCOSE BLDC GLUCOMTR-MCNC: 161 MG/DL — HIGH (ref 70–99)
GLUCOSE SERPL-MCNC: 124 MG/DL — HIGH (ref 70–99)
HCT VFR BLD CALC: 35.7 % — SIGNIFICANT CHANGE UP (ref 34.5–45)
HGB BLD-MCNC: 10.5 G/DL — LOW (ref 11.5–15.5)
INR BLD: 1.15 RATIO — SIGNIFICANT CHANGE UP (ref 0.88–1.16)
MAGNESIUM SERPL-MCNC: 2.6 MG/DL — SIGNIFICANT CHANGE UP (ref 1.6–2.6)
MCHC RBC-ENTMCNC: 29.4 GM/DL — LOW (ref 32–36)
MCHC RBC-ENTMCNC: 30.4 PG — SIGNIFICANT CHANGE UP (ref 27–34)
MCV RBC AUTO: 103.5 FL — HIGH (ref 80–100)
PLATELET # BLD AUTO: 183 K/UL — SIGNIFICANT CHANGE UP (ref 150–400)
POTASSIUM SERPL-MCNC: 5 MMOL/L — SIGNIFICANT CHANGE UP (ref 3.5–5.3)
POTASSIUM SERPL-SCNC: 5 MMOL/L — SIGNIFICANT CHANGE UP (ref 3.5–5.3)
PROT SERPL-MCNC: 7 G/DL — SIGNIFICANT CHANGE UP (ref 6.6–8.7)
PROTHROM AB SERPL-ACNC: 13.2 SEC — SIGNIFICANT CHANGE UP (ref 10.6–13.6)
RBC # BLD: 3.45 M/UL — LOW (ref 3.8–5.2)
RBC # FLD: 20.5 % — HIGH (ref 10.3–14.5)
SODIUM SERPL-SCNC: 137 MMOL/L — SIGNIFICANT CHANGE UP (ref 135–145)
WBC # BLD: 10.72 K/UL — HIGH (ref 3.8–10.5)
WBC # FLD AUTO: 10.72 K/UL — HIGH (ref 3.8–10.5)

## 2021-05-18 PROCEDURE — 99233 SBSQ HOSP IP/OBS HIGH 50: CPT

## 2021-05-18 PROCEDURE — 36000 PLACE NEEDLE IN VEIN: CPT

## 2021-05-18 PROCEDURE — 76937 US GUIDE VASCULAR ACCESS: CPT | Mod: 26

## 2021-05-18 RX ORDER — SODIUM CHLORIDE 9 MG/ML
500 INJECTION INTRAMUSCULAR; INTRAVENOUS; SUBCUTANEOUS ONCE
Refills: 0 | Status: COMPLETED | OUTPATIENT
Start: 2021-05-18 | End: 2021-05-18

## 2021-05-18 RX ORDER — TUBERCULIN PURIFIED PROTEIN DERIVATIVE 5 [IU]/.1ML
5 INJECTION, SOLUTION INTRADERMAL ONCE
Refills: 0 | Status: COMPLETED | OUTPATIENT
Start: 2021-05-18 | End: 2021-05-18

## 2021-05-18 RX ADMIN — SODIUM CHLORIDE 500 MILLILITER(S): 9 INJECTION INTRAMUSCULAR; INTRAVENOUS; SUBCUTANEOUS at 23:41

## 2021-05-18 RX ADMIN — Medication 12.5 MILLIGRAM(S): at 06:43

## 2021-05-18 RX ADMIN — CHLORHEXIDINE GLUCONATE 1 APPLICATION(S): 213 SOLUTION TOPICAL at 06:44

## 2021-05-18 RX ADMIN — MIDODRINE HYDROCHLORIDE 15 MILLIGRAM(S): 2.5 TABLET ORAL at 06:43

## 2021-05-18 RX ADMIN — QUETIAPINE FUMARATE 50 MILLIGRAM(S): 200 TABLET, FILM COATED ORAL at 06:44

## 2021-05-18 RX ADMIN — Medication 2: at 17:19

## 2021-05-18 RX ADMIN — TUBERCULIN PURIFIED PROTEIN DERIVATIVE 5 UNIT(S): 5 INJECTION, SOLUTION INTRADERMAL at 21:22

## 2021-05-18 NOTE — PROCEDURE NOTE - ADDITIONAL PROCEDURE DETAILS
18G 10CM 40CIRC [place] POWER GLIDE MIDLINE good hem return ns flush right cephalic vein
Prior NGT pulled out. This is 2nd for today, Patient required restrained mitten for safety and medical care.
exchange over guide wire technique.  19.5 cm catheter place, 2 cm of catheter exposed
NGT placed in R nare. Pt resting comfortably in NAD. Tolerated procedure well. CXR for confirmation pending.
daughter was present on facetime to explain and have her mother cooperate.   NG tube auscultated over stomach.
20cm [employer] Power line 4fr midline catheter inserted. [R] basilic vein. Good flash, easily flushes, sharps disposed of. Tourniquet removed.
Ok to resume tube feeds.

## 2021-05-18 NOTE — PROCEDURE NOTE - NSSECUREBY_VASC_A_CORE
stabilization device/sterile occulsive dressing/sterile pressure dressing/tape
tape
stabilization device/sterile occulsive dressing/sterile pressure dressing/tape

## 2021-05-18 NOTE — PROGRESS NOTE ADULT - SUBJECTIVE AND OBJECTIVE BOX
IERNE TAYLOR    [id number]    71y      Female    INTERVAL HPI/OVERNIGHT EVENTS: patient being seen for cva and esrd. patient seen at bedside and is in nad but is confused    patient is npo for fistulogram and possible permacath placement       REVIEW OF SYSTEMS:    unable to obtain secondary to mental status       Vital Signs Last 24 Hrs  T(C): 36.3 (18 May 2021 08:45), Max: 36.7 (17 May 2021 16:30)  T(F): 97.4 (18 May 2021 08:45), Max: 98 (17 May 2021 16:30)  HR: 89 (18 May 2021 08:45) (82 - 109)  BP: 94/60 (18 May 2021 08:45) (94/60 - 142/90)  BP(mean): --  RR: 19 (18 May 2021 08:45) (18 - 19)  SpO2: 97% (18 May 2021 08:45) (92% - 100%)    PHYSICAL EXAM:    GENEAL: restraints, confused  HEAD:  Atraumatic, Normocephalic  NECK: Supple, No JVD, Normal thyroid  NERVOUS SYSTEM:  Awake  CHEST/LUNG: Clear to auscultation bilaterally  HEART: Regular rate and rhythm; No murmurs, rubs, or gallops  ABDOMEN: Soft, Nontender, Nondistended; Bowel sounds present  EXTREMITIES:  2+ Peripheral Pulses, No clubbing, cyanosis, or edema        LABS:                        10.5   10.72 )-----------( 183      ( 18 May 2021 06:57 )             35.7     05-18    137  |  98  |  57.0<H>  ----------------------------<  124<H>  5.0   |  19.0<L>  |  8.77<H>    Ca    8.6      18 May 2021 06:57  Phos  4.7     05-17  Mg     2.6     05-18    TPro  7.0  /  Alb  2.8<L>  /  TBili  0.4  /  DBili  x   /  AST  36<H>  /  ALT  9   /  AlkPhos  73  05-18    PT/INR - ( 18 May 2021 06:57 )   PT: 13.2 sec;   INR: 1.15 ratio         PTT - ( 18 May 2021 06:57 )  PTT:27.4 sec        MEDICATIONS  (STANDING):  aspirin  chewable 81 milliGRAM(s) Oral daily  atorvastatin 40 milliGRAM(s) Oral at bedtime  chlorhexidine 4% Liquid 1 Application(s) Topical <User Schedule>  chlorhexidine 4% Liquid 1 Application(s) Topical <User Schedule>  dextrose 40% Gel 15 Gram(s) Oral once  dextrose 5%. 1000 milliLiter(s) (50 mL/Hr) IV Continuous <Continuous>  dextrose 5%. 1000 milliLiter(s) (100 mL/Hr) IV Continuous <Continuous>  dextrose 50% Injectable 25 Gram(s) IV Push once  dextrose 50% Injectable 12.5 Gram(s) IV Push once  epoetin analilia-epbx (RETACRIT) Injectable 01813 Unit(s) IV Push <User Schedule>  glucagon  Injectable 1 milliGRAM(s) IntraMuscular once  insulin lispro (ADMELOG) corrective regimen sliding scale   SubCutaneous Before meals and at bedtime  melatonin 5 milliGRAM(s) Oral at bedtime  metoprolol tartrate 12.5 milliGRAM(s) Oral every 6 hours  midodrine. 15 milliGRAM(s) Oral three times a day  Nephro-debbie 1 Tablet(s) Oral daily  pantoprazole  Injectable 40 milliGRAM(s) IV Push daily  polyethylene glycol 3350 17 Gram(s) Oral daily  QUEtiapine 50 milliGRAM(s) Oral every 8 hours  senna 2 Tablet(s) Oral at bedtime    MEDICATIONS  (PRN):  acetaminophen    Suspension .. 650 milliGRAM(s) Oral every 6 hours PRN Temp greater or equal to 38C (100.4F), Mild Pain (1 - 3)  haloperidol    Injectable 2.5 milliGRAM(s) IntraMuscular every 8 hours PRN agitation  sodium chloride 0.9% lock flush 10 milliLiter(s) IV Push every 1 hour PRN Pre/post blood products, medications, blood draw, and to maintain line patency  sodium chloride 0.9% lock flush 10 milliLiter(s) IV Push every 1 hour PRN Pre/post blood products, medications, blood draw, and to maintain line patency      RADIOLOGY & ADDITIONAL TESTS:

## 2021-05-18 NOTE — PROGRESS NOTE ADULT - ASSESSMENT
Labs :              10.5   10.72 >-----------<  183      [05-18-21 @ 06:57]              35.7     137  |  98  |  57.0  ----------------------------<  124      [05-18-21 @ 06:57]  5.0   |  19.0  |  8.77        Ca     8.6     [05-18-21 @ 06:57]      Mg     2.6     [05-18-21 @ 06:57]      Phos  4.7     [05-17-21 @ 08:19]    TPro  7.0  /  Alb  2.8  /  TBili  0.4  /  DBili  x   /  AST  36  /  ALT  9   /  AlkPhos  73  [05-18-21 @ 06:57]    PT/INR: PT 13.2 , INR 1.15       [05-18-21 @ 06:57]  PTT: 27.4       [05-18-21 @ 06:57]    HbA1c 9.4      [02-07-20 @ 21:14]  TSH 1.15      [04-14-21 @ 08:13]  Lipid: chol 106, , HDL 40, LDL --      [04-30-21 @ 04:55]    HBsAb <3.0      [05-11-21 @ 00:04]  HBsAg Nonreact      [05-11-21 @ 00:04]  HBcAb Nonreact      [05-11-21 @ 00:04]  HCV 0.16, Nonreact      [05-11-21 @ 00:04]    Supportive care ,     SNF Placement,

## 2021-05-18 NOTE — PROGRESS NOTE ADULT - ASSESSMENT
72 y/o female with PMHx CAD s/p stents '07, HTN, MI, PAF, liver abscess, s/p biliary stent with removal (11/2018; 7/2/19), chronic pancreatitis, DM2 on insulin, diabetic neuropathy, ESRD (5/2018) on HD (M/W/F since 5/18) presents to [facility] as a transfer from [place] after she went to HD with and had  an episode of syncope and change in mental status. HD RN reported she became unresponsive, L gaze preference, and was perseverating. Patient went for emergent CTH which revealed evolving L posterior cerebral artery infarct with new hemorrhage. Patient was subsequently intubated due to concern for deteriorating. Patient was then transferred to [facility].     Of note, patient was recently admitted to [place] on 4/12/21 for hypoxic/hypercapnic resp failure and hyperkalemia secondary to ESRD. While admitted pt was found to be confused, MRI obtained revealed large acute infarct of the left PCA. Pt was originally on Asprin which was changed to Plavix. Patient was discharged to rehab on Plavix.    Patient brought to NSICU and seen by nephro and continued on HD. Patient was extubated on 5/1. Patient seen by vascular for low flow out of the fistula and had a left femoral placed for HD. Patient downgraded to medical floor. Patients course complicated by multiple periods of pulling IV lines and the original left IJ. Family meeting attempted and family wants full code.      patient pulled her second left IJ on 5.14  patient also pulled her peripheral lines   5.17 midline placed today   5/18- overnight, she pulled midline, placed another one today     #Acute metabolic encephalopathy in setting of dementia - secondary to multiple cvas in the past month  asa and statin   depakote stopped, c/w seroquel   neurology following  ct head repeated on 5.11 - stable cva  ammonia, tsh WNL  - supportive care  seroquel     #CVA - in setting of afib  - lovenox renally dosed started, hold todays dose for procedure    #dm2 - elevated a1c 8.7  c.w ssi    #afib -   Continue metoprolol 12.5mg qid  no dig as per EP     #esrd - renal following   c.w HD  vascular following - spoke to vascular, fistulogram and permacath today  patient pulled her second ij   will place on 1:1    prognosis poor   palliative following  - family wants full code, Family meeting had by previous hospitalist  eventual CURRY this week   spoke to daughter

## 2021-05-18 NOTE — PROGRESS NOTE ADULT - SUBJECTIVE AND OBJECTIVE BOX
IRENE TAYLOR    [id number]    History:   Patient seen and examined this am.  No reported acute events overnight.  Still requiring restraints.  Awaiting left fistulogram today.    No reports of nausea, vomiting, chest pain, shortness of breath, abdominal pain or fever. No new complaints. No acute motor or sensory changes are reported.    Vital Signs Last 24 Hrs  T(C): 36.6 (18 May 2021 05:28), Max: 36.7 (17 May 2021 16:30)  T(F): 97.8 (18 May 2021 05:28), Max: 98 (17 May 2021 16:30)  HR: 82 (18 May 2021 05:28) (82 - 109)  BP: 142/90 (18 May 2021 05:28) (96/62 - 142/90)  RR: 18 (18 May 2021 05:28) (16 - 19)  SpO2: 94% (18 May 2021 05:28) (92% - 100%)    I&O's Summary                        10.5   10.72 )-----------( 183      ( 18 May 2021 06:57 )             35.7     05-18    137  |  98  |  57.0<H>  ----------------------------<  124<H>  5.0   |  19.0<L>  |  8.77<H>    Ca    8.6      18 May 2021 06:57  Phos  4.7     05-17  Mg     2.6     05-18    TPro  7.0  /  Alb  2.8<L>  /  TBili  0.4  /  DBili  x   /  AST  36<H>  /  ALT  9   /  AlkPhos  73  05-18      MEDICATIONS  (STANDING):  aspirin  chewable 81 milliGRAM(s) Oral daily  atorvastatin 40 milliGRAM(s) Oral at bedtime  chlorhexidine 4% Liquid 1 Application(s) Topical <User Schedule>  chlorhexidine 4% Liquid 1 Application(s) Topical <User Schedule>  dextrose 40% Gel 15 Gram(s) Oral once  dextrose 5%. 1000 milliLiter(s) (50 mL/Hr) IV Continuous <Continuous>  dextrose 5%. 1000 milliLiter(s) (100 mL/Hr) IV Continuous <Continuous>  dextrose 50% Injectable 25 Gram(s) IV Push once  dextrose 50% Injectable 12.5 Gram(s) IV Push once  epoetin analilia-epbx (RETACRIT) Injectable 90066 Unit(s) IV Push <User Schedule>  glucagon  Injectable 1 milliGRAM(s) IntraMuscular once  insulin lispro (ADMELOG) corrective regimen sliding scale   SubCutaneous Before meals and at bedtime  melatonin 5 milliGRAM(s) Oral at bedtime  metoprolol tartrate 12.5 milliGRAM(s) Oral every 6 hours  midodrine. 15 milliGRAM(s) Oral three times a day  Nephro-debbie 1 Tablet(s) Oral daily  pantoprazole  Injectable 40 milliGRAM(s) IV Push daily  polyethylene glycol 3350 17 Gram(s) Oral daily  QUEtiapine 50 milliGRAM(s) Oral every 8 hours  senna 2 Tablet(s) Oral at bedtime    MEDICATIONS  (PRN):  acetaminophen    Suspension .. 650 milliGRAM(s) Oral every 6 hours PRN Temp greater or equal to 38C (100.4F), Mild Pain (1 - 3)  haloperidol    Injectable 2.5 milliGRAM(s) IntraMuscular every 8 hours PRN agitation  sodium chloride 0.9% lock flush 10 milliLiter(s) IV Push every 1 hour PRN Pre/post blood products, medications, blood draw, and to maintain line patency  sodium chloride 0.9% lock flush 10 milliLiter(s) IV Push every 1 hour PRN Pre/post blood products, medications, blood draw, and to maintain line patency    Physical exam:     Asleep, but arousable  oriented x 0  wrist restraints in place  left AVF - with pulsatile cephalic out flow  + radial  . Capillary refill is less than 2 seconds. No cyanosis.      - Non functioning left AVF,   -   - non-compliant patient who poses a danger to herself, demonstrated multiple self line removals    Plan:   • declotting of the left AVF today  - may also require TDC  placement ,    Family Not Grasping the Dangers of HD in a Demented Elderly Person,

## 2021-05-18 NOTE — PROGRESS NOTE ADULT - SUBJECTIVE AND OBJECTIVE BOX
Patient appears more calm.  Stated hello.   Continues to have restraints.   Planned for fistulogram/left AVF declotting.  Mood improved as has not needed Haldol.    REVIEW OF SYSTEMS  Constitutional - No fever,  +fatigue  Neurological - +loss of strength    FUNCTIONAL PROGRESS  5/18  Progress Summary: Chart reviewed. Contents noted. Attempt for f/u re: Pt diet tolerance monitoring. Pt currently NPO for scheduled fistulogram this AM, therefore unable to complete at this time. SLP to follow for re-attempt, as schedule allows.     5/12  Bed Mobility: Sit to Supine:     · Level of [place]	unable to perform    Bed Mobility: Supine to Sit:     · Level of [place]	unable to perform; pt resistant to mobility due to confusion    Transfer: Bed to Chair:    Bed to Chair Transfer:    Transfer Skill: Bed to Chair   · Level of [place]	unable to perform    Transfer: Sit to Stand:     · Level of [place]	unable to perform    Transfer: Stand to Sit:     · Level of [place]	unable to perform    Transfer: Toilet Transfer:     · Level of [place]	unable to perform  Bathing Training:     · Level of [place]	dependent (less than 25% patients effort)    Upper Body Dressing Training:     · Level of [place]	dependent (less than 25% patients effort); secondary to confusion    Lower Body Dressing Training:     · Level of [place]	dependent (less than 25% patients effort)    Toilet Hygiene Training:     · Level of [place]	dependent (less than 25% patients effort); secondary to prima fit    Grooming Training:     · Level of [place]	maximum assist (25% patients effort)    Eating/Self-Feeding Training:     · Level of [place]	maximum assist (25% patients effort)      VITALS  T(C): 36.3 (05-18-21 @ 08:45), Max: 36.7 (05-17-21 @ 16:30)  HR: 89 (05-18-21 @ 08:45) (82 - 109)  BP: 94/60 (05-18-21 @ 08:45) (94/60 - 142/90)  RR: 19 (05-18-21 @ 08:45) (18 - 19)  SpO2: 97% (05-18-21 @ 08:45) (92% - 100%)  Wt(kg): --    MEDICATIONS   acetaminophen    Suspension .. 650 milliGRAM(s) every 6 hours PRN  aspirin  chewable 81 milliGRAM(s) daily  atorvastatin 40 milliGRAM(s) at bedtime  chlorhexidine 4% Liquid 1 Application(s) <User Schedule>  chlorhexidine 4% Liquid 1 Application(s) <User Schedule>  dextrose 40% Gel 15 Gram(s) once  dextrose 5%. 1000 milliLiter(s) <Continuous>  dextrose 5%. 1000 milliLiter(s) <Continuous>  dextrose 50% Injectable 25 Gram(s) once  dextrose 50% Injectable 12.5 Gram(s) once  epoetin analilia-epbx (RETACRIT) Injectable 57049 Unit(s) <User Schedule>  glucagon  Injectable 1 milliGRAM(s) once  haloperidol    Injectable 2.5 milliGRAM(s) every 8 hours PRN  insulin lispro (ADMELOG) corrective regimen sliding scale   Before meals and at bedtime  melatonin 5 milliGRAM(s) at bedtime  metoprolol tartrate 12.5 milliGRAM(s) every 6 hours  midodrine. 15 milliGRAM(s) three times a day  Nephro-debbie 1 Tablet(s) daily  pantoprazole  Injectable 40 milliGRAM(s) daily  polyethylene glycol 3350 17 Gram(s) daily  QUEtiapine 50 milliGRAM(s) every 8 hours  senna 2 Tablet(s) at bedtime  sodium chloride 0.9% lock flush 10 milliLiter(s) every 1 hour PRN  sodium chloride 0.9% lock flush 10 milliLiter(s) every 1 hour PRN      RECENT LABS/IMAGING                          10.5   10.72 )-----------( 183      ( 18 May 2021 06:57 )             35.7     05-18    137  |  98  |  57.0<H>  ----------------------------<  124<H>  5.0   |  19.0<L>  |  8.77<H>    Ca    8.6      18 May 2021 06:57  Phos  4.7     05-17  Mg     2.6     05-18    TPro  7.0  /  Alb  2.8<L>  /  TBili  0.4  /  DBili  x   /  AST  36<H>  /  ALT  9   /  AlkPhos  73  05-18    PT/INR - ( 18 May 2021 06:57 )   PT: 13.2 sec;   INR: 1.15 ratio         PTT - ( 18 May 2021 06:57 )  PTT:27.4 sec              HEAD CT 5/3 - An evolving left-sided PCA distribution infarction is again seen with areas of gyral hyperattenuation. Findings may indicate superimposed petechial hemorrhagic transformation versus cortical laminar necrosis versus a combination of both. No new areas of acute intracranial hemorrhage are seen. Multiple small chronic infarcts are noted within the right cerebellar hemisphere. There is no hydrocephalus. There is diffuse cerebral volume loss with prominence of the sulci, fissures, and cisternal spaces which is normal for the patient's age. There is mild periventricular white matter hypoattenuation statistically compatible with microvascular changes given calcific atherosclerotic disease of the intracranial arteries. The paranasal sinuses and mastoid air cells are clear. The calvarium appears intact. The orbits appear unremarkable apart from cataract removal.    HEAD CT 5/11 - No significant interval change from 5/3/2021.    CXR 5/12 - Left IJ line with the tip in the right atrium and no pneumothorax, new. Mild, central pulmonary venous congestion, grossly unchanged  ----------------------------------------------------------------------------------------  PHYSICAL EXAM  Constitutional - NAD, Appears comfortable  Extremities - Bilateral wrist restraints   Neurologic Exam -                    Cognitive - Awake/Alert     Communication - Stated Hello and thank you  Psychiatric - Calm   ------------------------------------------------------------------------------------------------  ASSESSMENT/PLAN  71yFemale with functional deficits after developing an acute CVA  Left PCA CVA with hemorrhagic conversion - ASA, Lipitor  HTN - Lopressor  HypoTN - Midodrine  CRF - HD  Mood/Delirium - Seroquel 50mg Q8 (increased from 75/day 5/17), Melatonin 5mg HS (5/12), Haldol PRN  Pain - Tylenol  Oropharyngeal Dysphagia - Puree/Honey    DVT PPX - SCDs, Lovenox  Rehab - Improved behavior and expect patient to be off restraints with optimization of Seroquel for eventual LTCF/CURRY placement.      Continue mobilization by staff to maintain cardiopulmonary function and prevention of secondary complications related to debility.     Discussed with rehab team.

## 2021-05-18 NOTE — PROGRESS NOTE ADULT - SUBJECTIVE AND OBJECTIVE BOX
IRENE TAYLOR    [id number]    History:   Patient seen and examined this am.  No reported acute events overnight.  Still requiring restraints.  Awaiting left fisulogram today.  NPO since midnight.  No reports of nausea, vomiting, chest pain, shortness of breath, abdominal pain or fever. No new complaints. No acute motor or sensory changes are reported.    Vital Signs Last 24 Hrs  T(C): 36.6 (18 May 2021 05:28), Max: 36.7 (17 May 2021 16:30)  T(F): 97.8 (18 May 2021 05:28), Max: 98 (17 May 2021 16:30)  HR: 82 (18 May 2021 05:28) (82 - 109)  BP: 142/90 (18 May 2021 05:28) (96/62 - 142/90)  BP(mean): --  RR: 18 (18 May 2021 05:28) (16 - 19)  SpO2: 94% (18 May 2021 05:28) (92% - 100%)  I&O's Summary                            10.5   10.72 )-----------( 183      ( 18 May 2021 06:57 )             35.7     05-18    137  |  98  |  57.0<H>  ----------------------------<  124<H>  5.0   |  19.0<L>  |  8.77<H>    Ca    8.6      18 May 2021 06:57  Phos  4.7     05-17  Mg     2.6     05-18    TPro  7.0  /  Alb  2.8<L>  /  TBili  0.4  /  DBili  x   /  AST  36<H>  /  ALT  9   /  AlkPhos  73  05-18      MEDICATIONS  (STANDING):  aspirin  chewable 81 milliGRAM(s) Oral daily  atorvastatin 40 milliGRAM(s) Oral at bedtime  chlorhexidine 4% Liquid 1 Application(s) Topical <User Schedule>  chlorhexidine 4% Liquid 1 Application(s) Topical <User Schedule>  dextrose 40% Gel 15 Gram(s) Oral once  dextrose 5%. 1000 milliLiter(s) (50 mL/Hr) IV Continuous <Continuous>  dextrose 5%. 1000 milliLiter(s) (100 mL/Hr) IV Continuous <Continuous>  dextrose 50% Injectable 25 Gram(s) IV Push once  dextrose 50% Injectable 12.5 Gram(s) IV Push once  epoetin analilia-epbx (RETACRIT) Injectable 72635 Unit(s) IV Push <User Schedule>  glucagon  Injectable 1 milliGRAM(s) IntraMuscular once  insulin lispro (ADMELOG) corrective regimen sliding scale   SubCutaneous Before meals and at bedtime  melatonin 5 milliGRAM(s) Oral at bedtime  metoprolol tartrate 12.5 milliGRAM(s) Oral every 6 hours  midodrine. 15 milliGRAM(s) Oral three times a day  Nephro-debbie 1 Tablet(s) Oral daily  pantoprazole  Injectable 40 milliGRAM(s) IV Push daily  polyethylene glycol 3350 17 Gram(s) Oral daily  QUEtiapine 50 milliGRAM(s) Oral every 8 hours  senna 2 Tablet(s) Oral at bedtime    MEDICATIONS  (PRN):  acetaminophen    Suspension .. 650 milliGRAM(s) Oral every 6 hours PRN Temp greater or equal to 38C (100.4F), Mild Pain (1 - 3)  haloperidol    Injectable 2.5 milliGRAM(s) IntraMuscular every 8 hours PRN agitation  sodium chloride 0.9% lock flush 10 milliLiter(s) IV Push every 1 hour PRN Pre/post blood products, medications, blood draw, and to maintain line patency  sodium chloride 0.9% lock flush 10 milliLiter(s) IV Push every 1 hour PRN Pre/post blood products, medications, blood draw, and to maintain line patency      Physical exam:     Asleep, but arousable  oriented x 0  wrist restraints in place  left avf with pulsatile cephalic out flow  + radial  . Capillary refill is less than 2 seconds. No cyanosis.    Primary  Assessment:  - Non functioning left avf  - esrd on HD, complicated by recent acute cva  - non-compliant patient who poses a danger to herself, demonstrated my multiple self line removals    Plan:   • declotting of the left AVF today  - may also require permacath placement     keep NPO

## 2021-05-19 ENCOUNTER — APPOINTMENT (OUTPATIENT)
Dept: VASCULAR SURGERY | Facility: HOSPITAL | Age: 71
End: 2021-05-19

## 2021-05-19 LAB
ALBUMIN SERPL ELPH-MCNC: 2.6 G/DL — LOW (ref 3.3–5.2)
ALP SERPL-CCNC: 70 U/L — SIGNIFICANT CHANGE UP (ref 40–120)
ALT FLD-CCNC: 6 U/L — SIGNIFICANT CHANGE UP
ANION GAP SERPL CALC-SCNC: 21 MMOL/L — HIGH (ref 5–17)
ANISOCYTOSIS BLD QL: SIGNIFICANT CHANGE UP
AST SERPL-CCNC: 15 U/L — SIGNIFICANT CHANGE UP
BASOPHILS # BLD AUTO: 0.09 K/UL — SIGNIFICANT CHANGE UP (ref 0–0.2)
BASOPHILS NFR BLD AUTO: 0.9 % — SIGNIFICANT CHANGE UP (ref 0–2)
BILIRUB SERPL-MCNC: 0.4 MG/DL — SIGNIFICANT CHANGE UP (ref 0.4–2)
BUN SERPL-MCNC: 64 MG/DL — HIGH (ref 8–20)
CALCIUM SERPL-MCNC: 8.5 MG/DL — LOW (ref 8.6–10.2)
CHLORIDE SERPL-SCNC: 99 MMOL/L — SIGNIFICANT CHANGE UP (ref 98–107)
CO2 SERPL-SCNC: 20 MMOL/L — LOW (ref 22–29)
CREAT SERPL-MCNC: 9.85 MG/DL — HIGH (ref 0.5–1.3)
EOSINOPHIL # BLD AUTO: 0.08 K/UL — SIGNIFICANT CHANGE UP (ref 0–0.5)
EOSINOPHIL NFR BLD AUTO: 0.8 % — SIGNIFICANT CHANGE UP (ref 0–6)
GLUCOSE BLDC GLUCOMTR-MCNC: 131 MG/DL — HIGH (ref 70–99)
GLUCOSE BLDC GLUCOMTR-MCNC: 134 MG/DL — HIGH (ref 70–99)
GLUCOSE BLDC GLUCOMTR-MCNC: 151 MG/DL — HIGH (ref 70–99)
GLUCOSE BLDC GLUCOMTR-MCNC: 155 MG/DL — HIGH (ref 70–99)
GLUCOSE SERPL-MCNC: 130 MG/DL — HIGH (ref 70–99)
HAV IGM SER-ACNC: SIGNIFICANT CHANGE UP
HBV CORE IGM SER-ACNC: SIGNIFICANT CHANGE UP
HBV SURFACE AG SER-ACNC: SIGNIFICANT CHANGE UP
HCT VFR BLD CALC: 29.7 % — LOW (ref 34.5–45)
HCV AB S/CO SERPL IA: 0.12 S/CO — SIGNIFICANT CHANGE UP (ref 0–0.99)
HCV AB SERPL-IMP: SIGNIFICANT CHANGE UP
HGB BLD-MCNC: 8.7 G/DL — LOW (ref 11.5–15.5)
LYMPHOCYTES # BLD AUTO: 2.59 K/UL — SIGNIFICANT CHANGE UP (ref 1–3.3)
LYMPHOCYTES # BLD AUTO: 25 % — SIGNIFICANT CHANGE UP (ref 13–44)
MACROCYTES BLD QL: SIGNIFICANT CHANGE UP
MAGNESIUM SERPL-MCNC: 2.5 MG/DL — SIGNIFICANT CHANGE UP (ref 1.6–2.6)
MANUAL SMEAR VERIFICATION: SIGNIFICANT CHANGE UP
MCHC RBC-ENTMCNC: 29.3 GM/DL — LOW (ref 32–36)
MCHC RBC-ENTMCNC: 29.9 PG — SIGNIFICANT CHANGE UP (ref 27–34)
MCV RBC AUTO: 102.1 FL — HIGH (ref 80–100)
MONOCYTES # BLD AUTO: 0.89 K/UL — SIGNIFICANT CHANGE UP (ref 0–0.9)
MONOCYTES NFR BLD AUTO: 8.6 % — SIGNIFICANT CHANGE UP (ref 2–14)
MYELOCYTES NFR BLD: 0.9 % — HIGH (ref 0–0)
NEUTROPHILS # BLD AUTO: 6.6 K/UL — SIGNIFICANT CHANGE UP (ref 1.8–7.4)
NEUTROPHILS NFR BLD AUTO: 63.8 % — SIGNIFICANT CHANGE UP (ref 43–77)
OVALOCYTES BLD QL SMEAR: SLIGHT — SIGNIFICANT CHANGE UP
PLAT MORPH BLD: NORMAL — SIGNIFICANT CHANGE UP
PLATELET # BLD AUTO: 303 K/UL — SIGNIFICANT CHANGE UP (ref 150–400)
POLYCHROMASIA BLD QL SMEAR: SLIGHT — SIGNIFICANT CHANGE UP
POTASSIUM SERPL-MCNC: 4.5 MMOL/L — SIGNIFICANT CHANGE UP (ref 3.5–5.3)
POTASSIUM SERPL-SCNC: 4.5 MMOL/L — SIGNIFICANT CHANGE UP (ref 3.5–5.3)
PROT SERPL-MCNC: 6.6 G/DL — SIGNIFICANT CHANGE UP (ref 6.6–8.7)
RBC # BLD: 2.91 M/UL — LOW (ref 3.8–5.2)
RBC # FLD: 20.7 % — HIGH (ref 10.3–14.5)
RBC BLD AUTO: ABNORMAL
SARS-COV-2 RNA SPEC QL NAA+PROBE: SIGNIFICANT CHANGE UP
SODIUM SERPL-SCNC: 140 MMOL/L — SIGNIFICANT CHANGE UP (ref 135–145)
WBC # BLD: 10.34 K/UL — SIGNIFICANT CHANGE UP (ref 3.8–10.5)
WBC # FLD AUTO: 10.34 K/UL — SIGNIFICANT CHANGE UP (ref 3.8–10.5)

## 2021-05-19 PROCEDURE — 99233 SBSQ HOSP IP/OBS HIGH 50: CPT

## 2021-05-19 PROCEDURE — 90937 HEMODIALYSIS REPEATED EVAL: CPT

## 2021-05-19 PROCEDURE — 36556 INSERT NON-TUNNEL CV CATH: CPT

## 2021-05-19 PROCEDURE — 99232 SBSQ HOSP IP/OBS MODERATE 35: CPT

## 2021-05-19 PROCEDURE — 71045 X-RAY EXAM CHEST 1 VIEW: CPT | Mod: 26

## 2021-05-19 RX ORDER — ERYTHROPOIETIN 10000 [IU]/ML
10000 INJECTION, SOLUTION INTRAVENOUS; SUBCUTANEOUS
Refills: 0 | Status: DISCONTINUED | OUTPATIENT
Start: 2021-05-19 | End: 2021-05-23

## 2021-05-19 RX ORDER — SODIUM BICARBONATE 1 MEQ/ML
1300 SYRINGE (ML) INTRAVENOUS
Refills: 0 | Status: DISCONTINUED | OUTPATIENT
Start: 2021-05-19 | End: 2021-05-23

## 2021-05-19 RX ORDER — SODIUM CHLORIDE 9 MG/ML
10 INJECTION INTRAMUSCULAR; INTRAVENOUS; SUBCUTANEOUS
Refills: 0 | Status: DISCONTINUED | OUTPATIENT
Start: 2021-05-19 | End: 2021-05-23

## 2021-05-19 RX ORDER — CHLORHEXIDINE GLUCONATE 213 G/1000ML
1 SOLUTION TOPICAL
Refills: 0 | Status: DISCONTINUED | OUTPATIENT
Start: 2021-05-19 | End: 2021-05-23

## 2021-05-19 RX ADMIN — MIDODRINE HYDROCHLORIDE 15 MILLIGRAM(S): 2.5 TABLET ORAL at 05:31

## 2021-05-19 RX ADMIN — ERYTHROPOIETIN 10000 UNIT(S): 10000 INJECTION, SOLUTION INTRAVENOUS; SUBCUTANEOUS at 16:09

## 2021-05-19 RX ADMIN — Medication 2: at 11:32

## 2021-05-19 RX ADMIN — PANTOPRAZOLE SODIUM 40 MILLIGRAM(S): 20 TABLET, DELAYED RELEASE ORAL at 11:32

## 2021-05-19 RX ADMIN — CHLORHEXIDINE GLUCONATE 1 APPLICATION(S): 213 SOLUTION TOPICAL at 05:43

## 2021-05-19 NOTE — PROCEDURE NOTE - NSANESTHESIA_GEN_A_CORE
1% lidocaine
no anesthesia administered
1% lidocaine

## 2021-05-19 NOTE — CONSULT NOTE ADULT - PROBLEM SELECTOR RECOMMENDATION 4
Discussed case with Dr. Bob  - Patient awake, confused - in HD during time of assessment   - Call placed to patient's daughter-in-law Kenny (wife to patient's son, Jose Antonio)  Kenny explained patient lives with her and her spouse  sidney- but most recently had been at [facility] which is where she came prior to hospitalization. Requested for Kenny to give me her understanding of the patient's current medical course up to this point. Kenny informed me that the patient had a stroke in the past and now another stroke. Kenny also noted that over the course of the last 6 months she has noticed her mother-in-law has not been able to tolerate HD like she used to (she has been on HD for 3 years) Kenny stated the patient has 3 children who live local but that her  Jose Antonio and Her speak on her behalf regarding medical decisions and planning. Kenny also opened up regarding patient's "heart stopping" and "being brought back". Touched upon code status - Kenny confirmed if patient was to sustain cardiopulmonary arrest she is to be Full Code, CPR and intubation.   - Will continue to support and follow     Total Time Spent__50__ minutes  This includes chart review, patient assessment, discussion and collaboration with interdisciplinary team members, ACP planning  20 minutes on GOC with Kenny ACP planning    Thank you for the opportunity to assist with the care of this patient.   [place] [facility] Consult [facility] [phone].
-Last saw patient on 5-11-21 at which time extensive family meeting held with Kenny (daughter in law) Jose Antonio (patient's son) and patient's  Dominic Black. Dominic verbally consented to allow his son Jose Antonio and his wife Kenny to make health care decisions for his wife, Javier. The family meeting resulted in their Goals being expressed for aggressive treatment, medically optimize, and continue with HD (including being evaluated by vascular for port access) -Reconfirmed, at that time, in the event of cardiopulmonary arrest, patient to have CPR and INTUBATION - all family members agreed ( Kenny, Jose Antonio, and Dominic) I had also discussed patient's overall poor prognosis, diagnosis, treatment options - including HD/need for new access, advancing diet [risk of aspiration], option for conservative management with hospice support at home, and answered all questions/concerns family had.   -Now being reconsulted for readdressing GOC as patient continues to pull at her HD sites and midlines/peripheral sites despite multiple attempts to re-insert   - Call placed to Lori - we spoke about options moving forward for hospice support at home - Lori wants to try one more time with HD access before entertaining the idea of hospice support. Discussed this with Dr. Bob. From a palliative care perspective, recommend consideration of Ethics Consult.  -Will continue to support and follow    Total Time Spent__45__ minutes  This includes chart review, patient assessment, discussion and collaboration with interdisciplinary team members, ACP planning    Thank you for the opportunity to assist with the care of this patient.   [facility] Consult Service [phone].

## 2021-05-19 NOTE — CONSULT NOTE ADULT - ASSESSMENT
71 year old female complex medical history including cardiac and renal extensive disease -also with recent admission for pneumonia with hospital course complicated with L PCA CVA- discharged on Plavix (not ACT as patient was high risk for bleed) now with hemorrhagic conversion in L PCA.  Patients course complicated by multiple periods of pulling midlines, HD sites, peripheral lines 71 year old female complex medical history including cardiac and renal extensive disease -also with recent admission for pneumonia with hospital course complicated with L PCA CVA- discharged on Plavix (not ACT as patient was high risk for bleed) now with hemorrhagic conversion in L PCA.  Patients course complicated by multiple periods of pulling midlines, HD sites, peripheral lines    <Original Consult HPI This is a 71 year old female PMHx CAD s/p stents, HTN, MI, PAF, liver abscess, s/p biliary stent with removal (11/2018; 7/2/19), chronic pancreatitis, DM2 on insulin, diabetic neuropathy, ESRD (5/2018) on HD (M/W/F since 5/2018) presents to [facility] as a transfer from [place] after she went to HD today with and had  an episode of syncope and change in mental status.Ct head was done which displayed evolving L posterior cerebral artery infarct with new hemorrhage. Sent to [facility] , patient was intubated. Seen by neurosurgery and neuroicu team. Admitted in NSICU, coded while there ROSC x 1 round of CPR at which time patient also self extubated and reintubated. Course complicated also with AVF not functioning and vascular consulted due to thrombosis of cephalic vein - fistulogram being discussed. Passed swallow eval on pureed diet. Remains confused. Cardiology following - EP for possible watchman procedure. Now extubated on 5/1, downgraded to medicine team 5/4. Called for palliative care consult given complex medical issues and GOC to be addressed. > end of copied text

## 2021-05-19 NOTE — PROCEDURE NOTE - PRACTITIONER PERFORMING THE TIME OUT
Kristie CONNER
Stepan mercado
Dr. Bob
saira
JERRY baker
HUANG MULLEN
Myself
Jean-Paul
astrid
Tanmay Conway

## 2021-05-19 NOTE — PROCEDURE NOTE - NSINFORMCONSENT_GEN_A_CORE
This was an emergent procedure.
hcp via telephone/Benefits, risks, and possible complications of procedure explained to patient/caregiver who verbalized understanding and gave verbal consent.
via telephone , daughter Clementineinder/Benefits, risks, and possible complications of procedure explained to patient/caregiver who verbalized understanding and gave written consent.
Benefits, risks, and possible complications of procedure explained to patient/caregiver who verbalized understanding and gave verbal consent.
Benefits, risks, and possible complications of procedure explained to patient/caregiver who verbalized understanding and gave verbal consent.
This was an emergent procedure.
via telephone from daughter/Benefits, risks, and possible complications of procedure explained to patient/caregiver who verbalized understanding and gave verbal consent.
replacement
Benefits, risks, and possible complications of procedure explained to patient/caregiver who verbalized understanding and gave verbal consent.
via telehone with daughter/Benefits, risks, and possible complications of procedure explained to patient/caregiver who verbalized understanding and gave verbal consent.

## 2021-05-19 NOTE — PROCEDURE NOTE - NSICDXPROCEDURE_GEN_ALL_CORE_FT
PROCEDURES:  Insertion, catheter, hemodialysis, non-tunneled, with US guidance 06-May-2021 12:05:32  Joseph Stewart  
PROCEDURES:  Insertion, catheter, hemodialysis, non-tunneled, with US guidance 06-May-2021 12:05:32  Joseph Stewart  Insertion, catheter, venous, hemodialysis 10-May-2021 11:33:24 guide wire exchange Joseph Stewart  
PROCEDURES:  Insertion, catheter, hemodialysis, non-tunneled, with US guidance 06-May-2021 12:05:32  Joseph Stewart  
PROCEDURES:  Insertion, catheter, venous, hemodialysis 10-May-2021 11:33:24 guide wire exchange Joseph Stewart

## 2021-05-19 NOTE — PROCEDURE NOTE - NSTOLERANCE_GEN_A_CORE
Patient tolerated procedure well.
extremely combative during the procedure

## 2021-05-19 NOTE — PROGRESS NOTE ADULT - SUBJECTIVE AND OBJECTIVE BOX
D/W The daughter,    Rec : ESRD _ Conservative Management,    No HD,    Family  in Agreement,     Ty.

## 2021-05-19 NOTE — CONSULT NOTE ADULT - CONSULT REASON
Hypotension, A-fib RVR
Palliative Care
stroke
Stroke
Watchman evaluation
Evaluation of LUE AVF
CVA
L PCA infarct with hemorrhagic conversion
ESRD - HD
Palliative Care

## 2021-05-19 NOTE — CONSULT NOTE ADULT - CONSULT REQUESTED DATE/TIME
28-Apr-2021 17:18
30-Apr-2021 17:55
05-May-2021 15:05
05-May-2021 15:08
29-Apr-2021 10:54
05-May-2021 14:46
04-May-2021 16:15
06-May-2021 04:19
05-May-2021 11:52
19-May-2021 12:29

## 2021-05-19 NOTE — CONSULT NOTE ADULT - PROBLEM SELECTOR RECOMMENDATION 3
Total assist in ADLS  Maintain safety, fall, aspiration precautions
Total assist in ADLS  Maintain safety, fall, aspiration precautions.

## 2021-05-19 NOTE — PROCEDURE NOTE - NSCVLACTUALSITE_VASC_A_CORE
left/internal jugular
left/femoral vein

## 2021-05-19 NOTE — PROCEDURE NOTE - NSINDICATIONS_GEN_A_CORE
feeding difficulties
critical patient
dialysis/CRRT
dialysis/CRRT
venous access
critical illness/emergency venous access/venous access
feeds
dialysis/CRRT
feeding difficulties
venous access
feeds
dialysis/CRRT
dialysis/CRRT

## 2021-05-19 NOTE — CONSULT NOTE ADULT - REASON FOR ADMISSION
CVA with hemorrhagic transformation

## 2021-05-19 NOTE — PROGRESS NOTE ADULT - SUBJECTIVE AND OBJECTIVE BOX
Vascular Surgery follow up    Patient with a difficult HD course to date.    Recent multiple self line removals    Renal team and family agreed to conservative management     - will defer HD access placement at this time  - Please reconsult if care plan changes

## 2021-05-19 NOTE — PROCEDURE NOTE - NSNUMOFLUMENS_VASC_A_CORE
single lumen
double lumen
triple lumen
double lumen
single lumen
double lumen

## 2021-05-19 NOTE — PROCEDURE NOTE - NSPOSTCAREGUIDE_GEN_A_CORE
Care for catheter as per unit/ICU protocols
Verbal/written post procedure instructions were given to patient/caregiver
Care for catheter as per unit/ICU protocols
Care for catheter as per unit/ICU protocols
Verbal/written post procedure instructions were given to patient/caregiver/Instructed patient/caregiver to follow-up with primary care physician/Instructed patient/caregiver regarding signs and symptoms of infection/Care for catheter as per unit/ICU protocols
Verbal/written post procedure instructions were given to patient/caregiver
Verbal/written post procedure instructions were given to patient/caregiver/Instructed patient/caregiver to follow-up with primary care physician/Instructed patient/caregiver regarding signs and symptoms of infection/Keep the cast/splint/dressing clean and dry/Care for catheter as per unit/ICU protocols
per floor protocol
Care for catheter as per unit/ICU protocols

## 2021-05-19 NOTE — PROCEDURE NOTE - ATTENDING PROVIDER
Dr. Bob
Ned Bob
evette mercado
Ab Kumar M.D.
Dr. Bob
Dr JOSEPHINE Bob
Paulino
genet mercado
manvar
tricia mercado

## 2021-05-19 NOTE — PROCEDURE NOTE - PROCEDURE DATE TIME, MLM
17-May-2021
19-May-2021 13:30
07-May-2021 14:19
02-May-2021 23:31
06-May-2021 17:00
28-Apr-2021 22:50
12-May-2021 10:35
18-May-2021 12:04
09-May-2021 16:45
06-May-2021 12:03
10-May-2021 11:29
29-Apr-2021 17:00

## 2021-05-19 NOTE — PROCEDURE NOTE - NSPROCDETAILS_GEN_ALL_CORE
nasogastric/audible air bolus/placement confirmed by auscultation
exchange over guide wire technique/guidewire recovered/lumen(s) aspirated and flushed/sterile dressing applied/sterile technique, catheter placed
guidewire recovered/lumen(s) aspirated and flushed/sterile dressing applied/sterile technique, catheter placed/ultrasound guidance with use of sterile gel and probe cove
nasogastric
location identified, draped/prepped, sterile technique used, needle inserted/introduced/positive blood return obtained via catheter/connected to a pressurized flush line/sutured in place/hemostasis with direct pressure, dressing applied/Seldinger technique/all materials/supplies accounted for at end of procedure
location identified, draped/prepped, sterile technique used/sterile dressing applied/sterile technique, catheter placed/supine position/ultrasound guidance
guidewire recovered/lumen(s) aspirated and flushed/sterile dressing applied/sterile technique, catheter placed/ultrasound guidance with use of sterile gel and probe cove
location identified, draped/prepped, sterile technique used/sterile dressing applied/sterile technique, catheter placed/supine position/ultrasound guidance

## 2021-05-19 NOTE — CONSULT NOTE ADULT - PROBLEM SELECTOR RECOMMENDATION 9
Recent L PCA CVA now with hemorrhagic conversion in L PCA  Was in NeuroICU / Neurosx involved as well - started on ASA and hep sq by Neurosx - on neurochecks  Cardiology and Nephro following.
Recent L PCA CVA now with hemorrhagic conversion in L PCA  Was in NeuroICU / Neurosx involved as well - started on ASA and hep sq by Neurosx - on neurochecks  Cardiology and Nephro following

## 2021-05-19 NOTE — CHART NOTE - NSCHARTNOTEFT_GEN_A_CORE
Per primary team request, after rediscussed with family, another HD access placement was requested    - verbal consent obtained via phone from daughter FAM Marquez  - Left IJ catheter placed  - follow up cxr

## 2021-05-19 NOTE — PROGRESS NOTE ADULT - ASSESSMENT
70 y/o female with PMHx CAD s/p stents '07, HTN, MI, PAF, liver abscess, s/p biliary stent with removal (11/2018; 7/2/19), chronic pancreatitis, DM2 on insulin, diabetic neuropathy, ESRD (5/2018) on HD (M/W/F since 5/18) presents to [facility] as a transfer from [place] after she went to HD with and had  an episode of syncope and change in mental status. HD RN reported she became unresponsive, L gaze preference, and was perseverating. Patient went for emergent CTH which revealed evolving L posterior cerebral artery infarct with new hemorrhage. Patient was subsequently intubated due to concern for deteriorating. Patient was then transferred to [facility].     Of note, patient was recently admitted to [place] on 4/12/21 for hypoxic/hypercapnic resp failure and hyperkalemia secondary to ESRD. While admitted pt was found to be confused, MRI obtained revealed large acute infarct of the left PCA. Pt was originally on Asprin which was changed to Plavix. Patient was discharged to rehab on Plavix.    Patient brought to NSICU and seen by nephro and continued on HD. Patient was extubated on 5/1. Patient seen by vascular for low flow out of the fistula and had a left femoral placed for HD. Patient downgraded to medical floor. Patients course complicated by multiple periods of pulling IV lines and the original left IJ. Family meeting attempted and family wants full code.      patient pulled her second left IJ on 5.14  patient also pulled her peripheral lines   5.17 midline placed today   5/18- overnight, she pulled midline, placed another one today     5/19 - permacath cancelled    #Acute metabolic encephalopathy in setting of dementia - secondary to multiple cvas in the past month  asa and statin   depakote stopped, c/w seroquel   neurology following  ct head repeated on 5.11 - stable cva  ammonia, tsh WNL  - supportive care  seroquel     #CVA - in setting of afib  - lovenox held for possible shilety resume post shiley placement     #dm2 - elevated a1c 8.7  c.w ssi    #afib -   Continue metoprolol 12.5mg qid  no dig as per EP   hold lovenox and resume post shiley     #esrd - renal following   vascular following -  will place shiley today or tomorrow  - hd to start after    prognosis poor   palliative following  - family wants full code,   - dispo unclear.

## 2021-05-19 NOTE — PROCEDURE NOTE - NSSITEPREP_SKIN_A_CORE
chlorhexidine
chlorhexidine/Adherence to aseptic technique: hand hygiene prior to donning barriers (gown, gloves), don cap and mask, sterile drape over patient
Adherence to aseptic technique: hand hygiene prior to donning barriers (gown, gloves), don cap and mask, sterile drape over patient
chlorhexidine
chlorhexidine
chlorhexidine/Adherence to aseptic technique: hand hygiene prior to donning barriers (gown, gloves), don cap and mask, sterile drape over patient
chlorhexidine
No abnormalities noted

## 2021-05-19 NOTE — PROCEDURE NOTE - NSPATIENTPOSTION_GEN_A_CORE
Trendelenburg
(semi) fowlers
Trendelenburg
(semi) fowlers
supine
Supine
Trendelenburg
(semi) fowlers
Trendelenburg

## 2021-05-19 NOTE — PROGRESS NOTE ADULT - SUBJECTIVE AND OBJECTIVE BOX
Patient was seen and evaluated on dialysis.   No c/o CP SOB NV  no F/C  no swelling    T(C): 36.7 (05-20-21 @ 05:04), Max: 37.1 (05-19-21 @ 19:00)  HR: 108 (05-20-21 @ 05:04) (94 - 108)  BP: 142/73 (05-20-21 @ 05:04) (97/54 - 142/73)  Wt(kg): --  PE ;  NAD  lungs - CTA  CV gr 1 murmer,  No gallop or rub  Abd : soft, NT BS +, No masses  Ext- No edema  Neuro : Grossly intact, moving extremities                             8.7    10.34 )-----------( 303      ( 19 May 2021 05:47 )             29.7        05-19    140  |  99  |  64.0<H>  ----------------------------<  130<H>  4.5   |  20.0<L>  |  9.85<H>    Ca    8.5<L>      19 May 2021 05:47  Mg     2.5     05-19    TPro  6.6  /  Alb  2.6<L>  /  TBili  0.4  /  DBili  x   /  AST  15  /  ALT  6   /  AlkPhos  70  05-19      MEDICATIONS  (STANDING):  acetaminophen    Suspension .. PRN  aspirin  chewable  atorvastatin  chlorhexidine 4% Liquid  chlorhexidine 4% Liquid  chlorhexidine 4% Liquid  dextrose 40% Gel  dextrose 5%.  dextrose 5%.  dextrose 50% Injectable  dextrose 50% Injectable  epoetin analilia-epbx (RETACRIT) Injectable  glucagon  Injectable  haloperidol    Injectable PRN  insulin lispro (ADMELOG) corrective regimen sliding scale  melatonin  metoprolol tartrate  midodrine.  Nephro-debbie  pantoprazole  Injectable  polyethylene glycol 3350  QUEtiapine  senna  sodium bicarbonate  sodium chloride 0.9% lock flush PRN  sodium chloride 0.9% lock flush PRN  sodium chloride 0.9% lock flush PRN      Patient stable  Avinash HD easily  Continue

## 2021-05-19 NOTE — CHART NOTE - NSCHARTNOTEFT_GEN_A_CORE
Preoperative Note    Preop Dx: Malfunctioning AVF  Surgeon: Dr. Garcia  Procedure: Fistulogram    Vital Signs Last 24 Hrs  T(C): 36.8 (19 May 2021 04:00), Max: 37.1 (18 May 2021 14:35)  T(F): 98.2 (19 May 2021 04:00), Max: 98.7 (18 May 2021 14:35)  HR: 108 (19 May 2021 05:13) (82 - 108)  BP: 86/54 (19 May 2021 05:13) (82/54 - 142/90)  BP(mean): --  RR: 18 (18 May 2021 23:20) (18 - 19)  SpO2: 97% (19 May 2021 04:00) (94% - 99%)                        10.5   10.72 )-----------( 183      ( 18 May 2021 06:57 )             35.7     05-18    137  |  98  |  57.0<H>  ----------------------------<  124<H>  5.0   |  19.0<L>  |  8.77<H>    Ca    8.6      18 May 2021 06:57  Phos  4.7     05-17  Mg     2.6     -18    TPro  7.0  /  Alb  2.8<L>  /  TBili  0.4  /  DBili  x   /  AST  36<H>  /  ALT  9   /  AlkPhos  73  05-18    PT/INR - ( 18 May 2021 06:57 )   PT: 13.2 sec;   INR: 1.15 ratio         PTT - ( 18 May 2021 06:57 )  PTT:27.4 sec  Daily     Daily     EK Normal sinus rhythm  Right bundle branch block  Left posterior fascicular block    CXR:   Mild, central pulmonary venous congestion, grossly unchanged  Type and Screen:     COVID Result: negative         A/P: 71y Female     - OR 21 for AV fistulogram with Dr. Michelle URBAN past midnight, except medications  - Morning Labs: CBC, BMP, coags, type & screen  - Medical clearance for OR

## 2021-05-19 NOTE — PROCEDURE NOTE - NSPERFORMEDBY_GEN_A_CORE
Myself
Myself
Dr. Bob/Attending
Myself
Resident
Myself
Myself/PA
Myself
Myself

## 2021-05-19 NOTE — PROGRESS NOTE ADULT - SUBJECTIVE AND OBJECTIVE BOX
Patient now on 1:1 with wrist and mitten restraints due to removing all lines.  Patient is moaning in response to hello or other cues.  Keeps eyes closed.    REVIEW OF SYSTEMS  Constitutional - No fever,  +fatigue  Neurological - +loss of strength    FUNCTIONAL PROGRESS  Total A    VITALS  T(C): 36.4 (05-19-21 @ 08:05), Max: 37.1 (05-18-21 @ 14:35)  HR: 108 (05-19-21 @ 08:05) (89 - 108)  BP: 115/72 (05-19-21 @ 08:05) (82/54 - 115/72)  RR: 18 (05-19-21 @ 08:05) (18 - 19)  SpO2: 98% (05-19-21 @ 08:05) (95% - 99%)  Wt(kg): --    MEDICATIONS   acetaminophen    Suspension .. 650 milliGRAM(s) every 6 hours PRN  aspirin  chewable 81 milliGRAM(s) daily  atorvastatin 40 milliGRAM(s) at bedtime  chlorhexidine 4% Liquid 1 Application(s) <User Schedule>  chlorhexidine 4% Liquid 1 Application(s) <User Schedule>  dextrose 40% Gel 15 Gram(s) once  dextrose 5%. 1000 milliLiter(s) <Continuous>  dextrose 5%. 1000 milliLiter(s) <Continuous>  dextrose 50% Injectable 25 Gram(s) once  dextrose 50% Injectable 12.5 Gram(s) once  epoetin analilia-epbx (RETACRIT) Injectable 32891 Unit(s) <User Schedule>  glucagon  Injectable 1 milliGRAM(s) once  haloperidol    Injectable 2.5 milliGRAM(s) every 8 hours PRN  insulin lispro (ADMELOG) corrective regimen sliding scale   Before meals and at bedtime  melatonin 5 milliGRAM(s) at bedtime  metoprolol tartrate 12.5 milliGRAM(s) every 6 hours  midodrine. 15 milliGRAM(s) three times a day  Nephro-debbie 1 Tablet(s) daily  pantoprazole  Injectable 40 milliGRAM(s) daily  polyethylene glycol 3350 17 Gram(s) daily  QUEtiapine 50 milliGRAM(s) every 8 hours  senna 2 Tablet(s) at bedtime  sodium chloride 0.9% lock flush 10 milliLiter(s) every 1 hour PRN  sodium chloride 0.9% lock flush 10 milliLiter(s) every 1 hour PRN      RECENT LABS/IMAGING                          8.7    10.34 )-----------( 303      ( 19 May 2021 05:47 )             29.7     05-19    140  |  99  |  64.0<H>  ----------------------------<  130<H>  4.5   |  20.0<L>  |  9.85<H>    Ca    8.5<L>      19 May 2021 05:47  Mg     2.5     05-19    TPro  6.6  /  Alb  2.6<L>  /  TBili  0.4  /  DBili  x   /  AST  15  /  ALT  6   /  AlkPhos  70  05-19    PT/INR - ( 18 May 2021 06:57 )   PT: 13.2 sec;   INR: 1.15 ratio         PTT - ( 18 May 2021 06:57 )  PTT:27.4 sec              HEAD CT 5/3 - An evolving left-sided PCA distribution infarction is again seen with areas of gyral hyperattenuation. Findings may indicate superimposed petechial hemorrhagic transformation versus cortical laminar necrosis versus a combination of both. No new areas of acute intracranial hemorrhage are seen. Multiple small chronic infarcts are noted within the right cerebellar hemisphere. There is no hydrocephalus. There is diffuse cerebral volume loss with prominence of the sulci, fissures, and cisternal spaces which is normal for the patient's age. There is mild periventricular white matter hypoattenuation statistically compatible with microvascular changes given calcific atherosclerotic disease of the intracranial arteries. The paranasal sinuses and mastoid air cells are clear. The calvarium appears intact. The orbits appear unremarkable apart from cataract removal.    HEAD CT 5/11 - No significant interval change from 5/3/2021.    CXR 5/12 - Left IJ line with the tip in the right atrium and no pneumothorax, new. Mild, central pulmonary venous congestion, grossly unchanged  ----------------------------------------------------------------------------------------  PHYSICAL EXAM  Constitutional - NAD, Appears comfortable  Extremities - Bilateral wrist restraints/Mittens  Neurologic Exam -                    Cognitive - Keeps eyes closed     Communication - Moans in response  Psychiatric - Calm   ------------------------------------------------------------------------------------------------  ASSESSMENT/PLAN  71yFemale with functional deficits after developing an acute CVA  Left PCA CVA with hemorrhagic conversion - ASA, Lipitor  HTN - Lopressor  HypoTN - Midodrine  CRF - HD, s/p Venogram   Mood/Delirium - Seroquel 50mg Q8 (increased from 75/day 5/17), Melatonin 5mg HS (5/12), Haldol PRN  Pain - Tylenol  Oropharyngeal Dysphagia - Puree/Honey    DVT PPX - SCDs, Lovenox  Rehab - **Concerns for patient's cognitive status and ability to control restlessness of impeding care with treatment considering HD. She will need continued optimization of her behavior to allow for removal of restraints and 1:1 for eventual DC to [facility]. Otherwise, may need to balance sedation for requested behavior to allow for aggressive care to take place at the cost of quality of life. Recommend ongoing conversations regarding above. Given these circumstances, patient may be best suited for HOME DC if family can provide care.     Continue mobilization by staff to maintain cardiopulmonary function and prevention of secondary complications related to debility.     Discussed with rehab team.

## 2021-05-19 NOTE — ED PROVIDER NOTE - NS ED NOTE AC HIGH RISK COUNTRIES
FAMILY HISTORY:  Sibling  Still living? Unknown  Family history of diabetes mellitus, Age at diagnosis: Age Unknown    
No

## 2021-05-19 NOTE — CONSULT NOTE ADULT - SUBJECTIVE AND OBJECTIVE BOX
Palliative Followup  Reconsult for GOC - patient continues to pull out sites for HD , peripheral lines, midlines - call placed to [facility] to readdress GOC see palliative encounter below ford etails    CC:cva    Present Symptoms: Unable to obtain due to poor mentation   Dyspnea: 0   Nausea/Vomiting: Unable to obtain due to poor mentation   Anxiety:  Unable to obtain due to poor mentation   Depression: Unable to obtain due to poor mentation   Fatigue: Yes   Loss of appetite: Unable to obtain due to poor mentation   Constipation: Unable to obtain due to poor mentation     Pain: Unable to obtain due to poor mentation             Character-            Duration-            Effect-            Factors-            Frequency-            Location-            Severity-    Review of Systems: Reviewed  Per HPI all other ROS negative  Unable to obtain due to poor mentation     MEDICATIONS  (STANDING):  aspirin  chewable 81 milliGRAM(s) Oral daily  atorvastatin 40 milliGRAM(s) Oral at bedtime  chlorhexidine 4% Liquid 1 Application(s) Topical <User Schedule>  chlorhexidine 4% Liquid 1 Application(s) Topical <User Schedule>  dextrose 40% Gel 15 Gram(s) Oral once  dextrose 5%. 1000 milliLiter(s) (50 mL/Hr) IV Continuous <Continuous>  dextrose 5%. 1000 milliLiter(s) (100 mL/Hr) IV Continuous <Continuous>  dextrose 50% Injectable 25 Gram(s) IV Push once  dextrose 50% Injectable 12.5 Gram(s) IV Push once  epoetin analilia-epbx (RETACRIT) Injectable 02748 Unit(s) SubCutaneous <User Schedule>  glucagon  Injectable 1 milliGRAM(s) IntraMuscular once  insulin lispro (ADMELOG) corrective regimen sliding scale   SubCutaneous Before meals and at bedtime  melatonin 5 milliGRAM(s) Oral at bedtime  metoprolol tartrate 12.5 milliGRAM(s) Oral every 6 hours  midodrine. 15 milliGRAM(s) Oral three times a day  Nephro-debbie 1 Tablet(s) Oral daily  pantoprazole  Injectable 40 milliGRAM(s) IV Push daily  polyethylene glycol 3350 17 Gram(s) Oral daily  QUEtiapine 50 milliGRAM(s) Oral every 8 hours  senna 2 Tablet(s) Oral at bedtime  sodium bicarbonate 1300 milliGRAM(s) Oral two times a day    MEDICATIONS  (PRN):  acetaminophen    Suspension .. 650 milliGRAM(s) Oral every 6 hours PRN Temp greater or equal to 38C (100.4F), Mild Pain (1 - 3)  haloperidol    Injectable 2.5 milliGRAM(s) IntraMuscular every 8 hours PRN agitation  sodium chloride 0.9% lock flush 10 milliLiter(s) IV Push every 1 hour PRN Pre/post blood products, medications, blood draw, and to maintain line patency  sodium chloride 0.9% lock flush 10 milliLiter(s) IV Push every 1 hour PRN Pre/post blood products, medications, blood draw, and to maintain line patency      Vital Signs Last 24 Hrs  T(C): 36.4 (19 May 2021 08:05), Max: 37.1 (18 May 2021 14:35)  T(F): 97.6 (19 May 2021 08:05), Max: 98.7 (18 May 2021 14:35)  HR: 108 (19 May 2021 08:05) (105 - 108)  BP: 115/72 (19 May 2021 08:05) (82/54 - 115/72)  BP(mean): --  RR: 18 (19 May 2021 08:05) (18 - 19)  SpO2: 98% (19 May 2021 08:05) (95% - 99%)    General: alert _ confused    Karnofsky:  %__30    HEENT: dry mouth    Lungs: comfortable    CV: tachycardia    GI: incontinent     : incontinent    MSK: weakness  edema generalized     Skin: thin skin, clamy , cool    LABS:                        8.7    10.34 )-----------( 303      ( 19 May 2021 05:47 )             29.7     05-19    140  |  99  |  64.0<H>  ----------------------------<  130<H>  4.5   |  20.0<L>  |  9.85<H>    Ca    8.5<L>      19 May 2021 05:47  Mg     2.5     05-19    TPro  6.6  /  Alb  2.6<L>  /  TBili  0.4  /  DBili  x   /  AST  15  /  ALT  6   /  AlkPhos  70  05-19    PT/INR - ( 18 May 2021 06:57 )   PT: 13.2 sec;   INR: 1.15 ratio         PTT - ( 18 May 2021 06:57 )  PTT:27.4 sec    I&O's Summary    RADIOLOGY & ADDITIONAL STUDIES:    CT brain 05.11.21  FINDINGS:  Similar-appearing subacute left occipitotemporal infarct with similar appearing increased gyral attenuation throughout the region of infarction. Redemonstration of subacute left thalamic infarct.  No hydrocephalus, midline shift, or effacement of the basal cisterns.  Chronic bilateral basal ganglia infarcts. Moderate white matter microvascular ischemic disease.  IMPRESSION:  No significant interval change from 5/3/2021.    CXR 05.10.21  IMPRESSION:  Left IJ line and NG tube in satisfactory position.  Mild, central pulmonary venous congestion, new    CT head 05.03.21  FINDINGS: An evolving left-sided PCA distribution infarction is again seen withareas of gyral hyperattenuation. Findings may indicate superimposed petechial hemorrhagic transformation versus cortical laminar necrosis versus a combination of both.  No new areas of acute intracranial hemorrhage are seen.  Multiple small chronic infarcts are noted within the right cerebellar hemisphere. There is no hydrocephalus.  There is diffuse cerebral volume loss with prominence of the sulci, fissures, and cisternal spaces which is normal for the patient's age. There is mild periventricular white matter hypoattenuation statistically compatible with microvascular changes given calcific atherosclerotic disease of the intracranial arteries.  The paranasal sinuses and mastoid air cells are clear. The calvarium appears intact. The orbits appear unremarkable apart from cataract removal.  IMPRESSION: Stable follow-up CT examination.    ADVANCE DIRECTIVES:   FULL CODE   Palliative Followup  Reconsult for GOC - patient continues to pull out sites for HD , peripheral lines, midlines - call placed to [facility] to readdress GOC see palliative encounter below ford details    CC:cva    Present Symptoms: Unable to obtain due to poor mentation   Dyspnea: 0   Nausea/Vomiting: Unable to obtain due to poor mentation   Anxiety:  Unable to obtain due to poor mentation   Depression: Unable to obtain due to poor mentation   Fatigue: Yes   Loss of appetite: Unable to obtain due to poor mentation   Constipation: Unable to obtain due to poor mentation     Pain: Unable to obtain due to poor mentation             Character-            Duration-            Effect-            Factors-            Frequency-            Location-            Severity-    Review of Systems: Reviewed  Per HPI all other ROS negative  Unable to obtain due to poor mentation     MEDICATIONS  (STANDING):  aspirin  chewable 81 milliGRAM(s) Oral daily  atorvastatin 40 milliGRAM(s) Oral at bedtime  chlorhexidine 4% Liquid 1 Application(s) Topical <User Schedule>  chlorhexidine 4% Liquid 1 Application(s) Topical <User Schedule>  dextrose 40% Gel 15 Gram(s) Oral once  dextrose 5%. 1000 milliLiter(s) (50 mL/Hr) IV Continuous <Continuous>  dextrose 5%. 1000 milliLiter(s) (100 mL/Hr) IV Continuous <Continuous>  dextrose 50% Injectable 25 Gram(s) IV Push once  dextrose 50% Injectable 12.5 Gram(s) IV Push once  epoetin analilia-epbx (RETACRIT) Injectable 96838 Unit(s) SubCutaneous <User Schedule>  glucagon  Injectable 1 milliGRAM(s) IntraMuscular once  insulin lispro (ADMELOG) corrective regimen sliding scale   SubCutaneous Before meals and at bedtime  melatonin 5 milliGRAM(s) Oral at bedtime  metoprolol tartrate 12.5 milliGRAM(s) Oral every 6 hours  midodrine. 15 milliGRAM(s) Oral three times a day  Nephro-debbie 1 Tablet(s) Oral daily  pantoprazole  Injectable 40 milliGRAM(s) IV Push daily  polyethylene glycol 3350 17 Gram(s) Oral daily  QUEtiapine 50 milliGRAM(s) Oral every 8 hours  senna 2 Tablet(s) Oral at bedtime  sodium bicarbonate 1300 milliGRAM(s) Oral two times a day    MEDICATIONS  (PRN):  acetaminophen    Suspension .. 650 milliGRAM(s) Oral every 6 hours PRN Temp greater or equal to 38C (100.4F), Mild Pain (1 - 3)  haloperidol    Injectable 2.5 milliGRAM(s) IntraMuscular every 8 hours PRN agitation  sodium chloride 0.9% lock flush 10 milliLiter(s) IV Push every 1 hour PRN Pre/post blood products, medications, blood draw, and to maintain line patency  sodium chloride 0.9% lock flush 10 milliLiter(s) IV Push every 1 hour PRN Pre/post blood products, medications, blood draw, and to maintain line patency      Vital Signs Last 24 Hrs  T(C): 36.4 (19 May 2021 08:05), Max: 37.1 (18 May 2021 14:35)  T(F): 97.6 (19 May 2021 08:05), Max: 98.7 (18 May 2021 14:35)  HR: 108 (19 May 2021 08:05) (105 - 108)  BP: 115/72 (19 May 2021 08:05) (82/54 - 115/72)  BP(mean): --  RR: 18 (19 May 2021 08:05) (18 - 19)  SpO2: 98% (19 May 2021 08:05) (95% - 99%)    General: alert _ confused    Karnofsky:  %__30    HEENT: dry mouth    Lungs: comfortable    CV: tachycardia    GI: incontinent     : incontinent    MSK: weakness  edema generalized     Skin: thin skin, clamy , cool    LABS:                        8.7    10.34 )-----------( 303      ( 19 May 2021 05:47 )             29.7     05-19    140  |  99  |  64.0<H>  ----------------------------<  130<H>  4.5   |  20.0<L>  |  9.85<H>    Ca    8.5<L>      19 May 2021 05:47  Mg     2.5     05-19    TPro  6.6  /  Alb  2.6<L>  /  TBili  0.4  /  DBili  x   /  AST  15  /  ALT  6   /  AlkPhos  70  05-19    PT/INR - ( 18 May 2021 06:57 )   PT: 13.2 sec;   INR: 1.15 ratio         PTT - ( 18 May 2021 06:57 )  PTT:27.4 sec    I&O's Summary    RADIOLOGY & ADDITIONAL STUDIES:    CT brain 05.11.21  FINDINGS:  Similar-appearing subacute left occipitotemporal infarct with similar appearing increased gyral attenuation throughout the region of infarction. Redemonstration of subacute left thalamic infarct.  No hydrocephalus, midline shift, or effacement of the basal cisterns.  Chronic bilateral basal ganglia infarcts. Moderate white matter microvascular ischemic disease.  IMPRESSION:  No significant interval change from 5/3/2021.    CXR 05.10.21  IMPRESSION:  Left IJ line and NG tube in satisfactory position.  Mild, central pulmonary venous congestion, new    CT head 05.03.21  FINDINGS: An evolving left-sided PCA distribution infarction is again seen withareas of gyral hyperattenuation. Findings may indicate superimposed petechial hemorrhagic transformation versus cortical laminar necrosis versus a combination of both.  No new areas of acute intracranial hemorrhage are seen.  Multiple small chronic infarcts are noted within the right cerebellar hemisphere. There is no hydrocephalus.  There is diffuse cerebral volume loss with prominence of the sulci, fissures, and cisternal spaces which is normal for the patient's age. There is mild periventricular white matter hypoattenuation statistically compatible with microvascular changes given calcific atherosclerotic disease of the intracranial arteries.  The paranasal sinuses and mastoid air cells are clear. The calvarium appears intact. The orbits appear unremarkable apart from cataract removal.  IMPRESSION: Stable follow-up CT examination.    ADVANCE DIRECTIVES:   FULL CODE

## 2021-05-19 NOTE — PROCEDURE NOTE - NSCHLORHEXIDINEBATH_GEN_A_CORE
To be discontinued when line removed

## 2021-05-19 NOTE — PROCEDURE NOTE - NSPROCNAME_GEN_A_CORE
Gastric Intubation/Gastric Lavage
Midline Insertion
Midline Insertion
Central Line Insertion
Central Line Insertion
Feeding Tube Placement
Central Line Insertion
Gastric Intubation/Gastric Lavage
Central Line Insertion
Arterial Puncture/Cannulation
Central Line Insertion
Feeding Tube Placement
Central Line Insertion

## 2021-05-19 NOTE — CONSULT NOTE ADULT - PROVIDER SPECIALTY LIST ADULT
Cardiology
Nephrology
Vascular Surgery
Critical Care
Physiatry
NSICU
Electrophysiology
Neurology
Palliative Care
Palliative Care

## 2021-05-19 NOTE — PROGRESS NOTE ADULT - ASSESSMENT
hd for 3hrs via LT IJ tolerated fairly, pressure drsg in placed; no fluid removed; lethargic; back to floor via bed in no distress.

## 2021-05-19 NOTE — PROGRESS NOTE ADULT - SUBJECTIVE AND OBJECTIVE BOX
IRENE TAYLOR    [id number]    71y      Female    INTERVAL HPI/OVERNIGHT EVENTS: patient being seen for cva and esrd.     patient seen at bedside and is confused       REVIEW OF SYSTEMS:    unable to obtain secondary to mental status     Vital Signs Last 24 Hrs  T(C): 36.4 (19 May 2021 08:05), Max: 37.1 (18 May 2021 14:35)  T(F): 97.6 (19 May 2021 08:05), Max: 98.7 (18 May 2021 14:35)  HR: 108 (19 May 2021 08:05) (105 - 108)  BP: 115/72 (19 May 2021 08:05) (82/54 - 115/72)  BP(mean): --  RR: 18 (19 May 2021 08:05) (18 - 19)  SpO2: 98% (19 May 2021 08:05) (95% - 99%)    PHYSICAL EXAM:    GENEAL: restraints, confused  HEAD:  Atraumatic, Normocephalic  NECK: Supple, No JVD, Normal thyroid  NERVOUS SYSTEM:  Awake  CHEST/LUNG: Clear to auscultation bilaterally  HEART: Regular rate and rhythm; No murmurs, rubs, or gallops  ABDOMEN: Soft, Nontender, Nondistended; Bowel sounds present  EXTREMITIES:  2+ Peripheral Pulses, No clubbing, cyanosis, or edema        LABS:                        8.7    10.34 )-----------( 303      ( 19 May 2021 05:47 )             29.7     05-19    140  |  99  |  64.0<H>  ----------------------------<  130<H>  4.5   |  20.0<L>  |  9.85<H>    Ca    8.5<L>      19 May 2021 05:47  Mg     2.5     05-19    TPro  6.6  /  Alb  2.6<L>  /  TBili  0.4  /  DBili  x   /  AST  15  /  ALT  6   /  AlkPhos  70  05-19    PT/INR - ( 18 May 2021 06:57 )   PT: 13.2 sec;   INR: 1.15 ratio         PTT - ( 18 May 2021 06:57 )  PTT:27.4 sec        MEDICATIONS  (STANDING):  aspirin  chewable 81 milliGRAM(s) Oral daily  atorvastatin 40 milliGRAM(s) Oral at bedtime  chlorhexidine 4% Liquid 1 Application(s) Topical <User Schedule>  chlorhexidine 4% Liquid 1 Application(s) Topical <User Schedule>  dextrose 40% Gel 15 Gram(s) Oral once  dextrose 5%. 1000 milliLiter(s) (50 mL/Hr) IV Continuous <Continuous>  dextrose 5%. 1000 milliLiter(s) (100 mL/Hr) IV Continuous <Continuous>  dextrose 50% Injectable 25 Gram(s) IV Push once  dextrose 50% Injectable 12.5 Gram(s) IV Push once  epoetin analilia-epbx (RETACRIT) Injectable 92341 Unit(s) SubCutaneous <User Schedule>  glucagon  Injectable 1 milliGRAM(s) IntraMuscular once  insulin lispro (ADMELOG) corrective regimen sliding scale   SubCutaneous Before meals and at bedtime  melatonin 5 milliGRAM(s) Oral at bedtime  metoprolol tartrate 12.5 milliGRAM(s) Oral every 6 hours  midodrine. 15 milliGRAM(s) Oral three times a day  Nephro-debbie 1 Tablet(s) Oral daily  pantoprazole  Injectable 40 milliGRAM(s) IV Push daily  polyethylene glycol 3350 17 Gram(s) Oral daily  QUEtiapine 50 milliGRAM(s) Oral every 8 hours  senna 2 Tablet(s) Oral at bedtime  sodium bicarbonate 1300 milliGRAM(s) Oral two times a day    MEDICATIONS  (PRN):  acetaminophen    Suspension .. 650 milliGRAM(s) Oral every 6 hours PRN Temp greater or equal to 38C (100.4F), Mild Pain (1 - 3)  haloperidol    Injectable 2.5 milliGRAM(s) IntraMuscular every 8 hours PRN agitation  sodium chloride 0.9% lock flush 10 milliLiter(s) IV Push every 1 hour PRN Pre/post blood products, medications, blood draw, and to maintain line patency  sodium chloride 0.9% lock flush 10 milliLiter(s) IV Push every 1 hour PRN Pre/post blood products, medications, blood draw, and to maintain line patency      RADIOLOGY & ADDITIONAL TESTS:

## 2021-05-19 NOTE — CONSULT NOTE ADULT - PROBLEM SELECTOR RECOMMENDATION 2
Nephrology following  Patient on HD since 2018 - family admits they've noticed a decline with tolerance towards HD over the past 6 months she becomes unstable during treatments (daughter in law stated specifically with hypotension)   Patient would be hospice eligible if family no longer wants to pursue HD
Nephrology following  Patient on HD since 2018 - family admits they've noticed a decline with tolerance towards HD over the past 6 months she becomes unstable during treatments (daughter in law stated specifically with hypotension)   Patient would be hospice eligible if family no longer wants to pursue HD.

## 2021-05-19 NOTE — PROCEDURE NOTE - NSCVLATTEMPTSITEVASC_A_CORE
left/femoral vein
left/internal jugular

## 2021-05-20 DIAGNOSIS — I63.9 CEREBRAL INFARCTION, UNSPECIFIED: ICD-10-CM

## 2021-05-20 DIAGNOSIS — R45.1 RESTLESSNESS AND AGITATION: ICD-10-CM

## 2021-05-20 DIAGNOSIS — Z51.5 ENCOUNTER FOR PALLIATIVE CARE: ICD-10-CM

## 2021-05-20 LAB
ALBUMIN SERPL ELPH-MCNC: 2.9 G/DL — LOW (ref 3.3–5.2)
ALP SERPL-CCNC: 76 U/L — SIGNIFICANT CHANGE UP (ref 40–120)
ALT FLD-CCNC: 7 U/L — SIGNIFICANT CHANGE UP
ANION GAP SERPL CALC-SCNC: 16 MMOL/L — SIGNIFICANT CHANGE UP (ref 5–17)
AST SERPL-CCNC: 17 U/L — SIGNIFICANT CHANGE UP
BASOPHILS # BLD AUTO: 0.07 K/UL — SIGNIFICANT CHANGE UP (ref 0–0.2)
BASOPHILS NFR BLD AUTO: 0.8 % — SIGNIFICANT CHANGE UP (ref 0–2)
BILIRUB SERPL-MCNC: 0.4 MG/DL — SIGNIFICANT CHANGE UP (ref 0.4–2)
BUN SERPL-MCNC: 36 MG/DL — HIGH (ref 8–20)
CALCIUM SERPL-MCNC: 8.5 MG/DL — LOW (ref 8.6–10.2)
CHLORIDE SERPL-SCNC: 101 MMOL/L — SIGNIFICANT CHANGE UP (ref 98–107)
CO2 SERPL-SCNC: 23 MMOL/L — SIGNIFICANT CHANGE UP (ref 22–29)
CREAT SERPL-MCNC: 6.76 MG/DL — HIGH (ref 0.5–1.3)
EOSINOPHIL # BLD AUTO: 0.14 K/UL — SIGNIFICANT CHANGE UP (ref 0–0.5)
EOSINOPHIL NFR BLD AUTO: 1.5 % — SIGNIFICANT CHANGE UP (ref 0–6)
GLUCOSE BLDC GLUCOMTR-MCNC: 123 MG/DL — HIGH (ref 70–99)
GLUCOSE BLDC GLUCOMTR-MCNC: 133 MG/DL — HIGH (ref 70–99)
GLUCOSE BLDC GLUCOMTR-MCNC: 135 MG/DL — HIGH (ref 70–99)
GLUCOSE BLDC GLUCOMTR-MCNC: 150 MG/DL — HIGH (ref 70–99)
GLUCOSE SERPL-MCNC: 119 MG/DL — HIGH (ref 70–99)
HCT VFR BLD CALC: 33.1 % — LOW (ref 34.5–45)
HGB BLD-MCNC: 9.6 G/DL — LOW (ref 11.5–15.5)
IMM GRANULOCYTES NFR BLD AUTO: 1.3 % — SIGNIFICANT CHANGE UP (ref 0–1.5)
LYMPHOCYTES # BLD AUTO: 2.96 K/UL — SIGNIFICANT CHANGE UP (ref 1–3.3)
LYMPHOCYTES # BLD AUTO: 32.6 % — SIGNIFICANT CHANGE UP (ref 13–44)
MAGNESIUM SERPL-MCNC: 2.3 MG/DL — SIGNIFICANT CHANGE UP (ref 1.6–2.6)
MCHC RBC-ENTMCNC: 29 GM/DL — LOW (ref 32–36)
MCHC RBC-ENTMCNC: 29.9 PG — SIGNIFICANT CHANGE UP (ref 27–34)
MCV RBC AUTO: 103.1 FL — HIGH (ref 80–100)
MONOCYTES # BLD AUTO: 1.04 K/UL — HIGH (ref 0–0.9)
MONOCYTES NFR BLD AUTO: 11.5 % — SIGNIFICANT CHANGE UP (ref 2–14)
NEUTROPHILS # BLD AUTO: 4.75 K/UL — SIGNIFICANT CHANGE UP (ref 1.8–7.4)
NEUTROPHILS NFR BLD AUTO: 52.3 % — SIGNIFICANT CHANGE UP (ref 43–77)
PLATELET # BLD AUTO: 223 K/UL — SIGNIFICANT CHANGE UP (ref 150–400)
POTASSIUM SERPL-MCNC: 4.4 MMOL/L — SIGNIFICANT CHANGE UP (ref 3.5–5.3)
POTASSIUM SERPL-SCNC: 4.4 MMOL/L — SIGNIFICANT CHANGE UP (ref 3.5–5.3)
PROT SERPL-MCNC: 7 G/DL — SIGNIFICANT CHANGE UP (ref 6.6–8.7)
RBC # BLD: 3.21 M/UL — LOW (ref 3.8–5.2)
RBC # FLD: 20.9 % — HIGH (ref 10.3–14.5)
SODIUM SERPL-SCNC: 140 MMOL/L — SIGNIFICANT CHANGE UP (ref 135–145)
WBC # BLD: 9.08 K/UL — SIGNIFICANT CHANGE UP (ref 3.8–10.5)
WBC # FLD AUTO: 9.08 K/UL — SIGNIFICANT CHANGE UP (ref 3.8–10.5)

## 2021-05-20 PROCEDURE — 90937 HEMODIALYSIS REPEATED EVAL: CPT

## 2021-05-20 PROCEDURE — 99233 SBSQ HOSP IP/OBS HIGH 50: CPT

## 2021-05-20 RX ORDER — ENOXAPARIN SODIUM 100 MG/ML
90 INJECTION SUBCUTANEOUS DAILY
Refills: 0 | Status: DISCONTINUED | OUTPATIENT
Start: 2021-05-20 | End: 2021-05-23

## 2021-05-20 RX ADMIN — Medication 81 MILLIGRAM(S): at 12:36

## 2021-05-20 RX ADMIN — PANTOPRAZOLE SODIUM 40 MILLIGRAM(S): 20 TABLET, DELAYED RELEASE ORAL at 12:36

## 2021-05-20 RX ADMIN — ENOXAPARIN SODIUM 90 MILLIGRAM(S): 100 INJECTION SUBCUTANEOUS at 12:36

## 2021-05-20 RX ADMIN — CHLORHEXIDINE GLUCONATE 1 APPLICATION(S): 213 SOLUTION TOPICAL at 05:48

## 2021-05-20 NOTE — PROGRESS NOTE ADULT - SUBJECTIVE AND OBJECTIVE BOX
IRENE TAYLOR    [id number]    71y      Female    INTERVAL HPI/OVERNIGHT EVENTS: patient being seen for cva and esrd.     patient was attempted HD today, unable to complete HD due to lack of flow through [facility].     REVIEW OF SYSTEMS:    unable to obtain secondary to mental status     Vital Signs Last 24 Hrs  T(C): 36.5 (20 May 2021 12:26), Max: 37.1 (19 May 2021 19:00)  T(F): 97.7 (20 May 2021 12:26), Max: 98.7 (19 May 2021 19:00)  HR: 116 (20 May 2021 12:26) (94 - 116)  BP: 133/74 (20 May 2021 12:26) (97/54 - 142/73)  BP(mean): --  RR: 18 (20 May 2021 12:26) (17 - 20)  SpO2: 98% (20 May 2021 12:26) (97% - 100%)    PHYSICAL EXAM:    GENEAL: restraints, confused  HEAD:  Atraumatic, Normocephalic  NECK: shiley  NERVOUS SYSTEM:  Awake  CHEST/LUNG: Clear to auscultation bilaterally  HEART: Regular rate and rhythm; No murmurs, rubs, or gallops  ABDOMEN: Soft, Nontender, Nondistended; Bowel sounds present  EXTREMITIES:  2+ Peripheral Pulses, No clubbing, cyanosis, or edema        LABS:                        9.6    9.08  )-----------( 223      ( 20 May 2021 06:57 )             33.1     05-20    140  |  101  |  36.0<H>  ----------------------------<  119<H>  4.4   |  23.0  |  6.76<H>    Ca    8.5<L>      20 May 2021 06:57  Mg     2.3     05-20    TPro  7.0  /  Alb  2.9<L>  /  TBili  0.4  /  DBili  x   /  AST  17  /  ALT  7   /  AlkPhos  76  05-20            MEDICATIONS  (STANDING):  aspirin  chewable 81 milliGRAM(s) Oral daily  atorvastatin 40 milliGRAM(s) Oral at bedtime  chlorhexidine 4% Liquid 1 Application(s) Topical <User Schedule>  chlorhexidine 4% Liquid 1 Application(s) Topical <User Schedule>  chlorhexidine 4% Liquid 1 Application(s) Topical <User Schedule>  dextrose 40% Gel 15 Gram(s) Oral once  dextrose 5%. 1000 milliLiter(s) (50 mL/Hr) IV Continuous <Continuous>  dextrose 5%. 1000 milliLiter(s) (100 mL/Hr) IV Continuous <Continuous>  dextrose 50% Injectable 25 Gram(s) IV Push once  dextrose 50% Injectable 12.5 Gram(s) IV Push once  enoxaparin Injectable 90 milliGRAM(s) SubCutaneous daily  epoetin analilia-epbx (RETACRIT) Injectable 43219 Unit(s) SubCutaneous <User Schedule>  glucagon  Injectable 1 milliGRAM(s) IntraMuscular once  insulin lispro (ADMELOG) corrective regimen sliding scale   SubCutaneous Before meals and at bedtime  melatonin 5 milliGRAM(s) Oral at bedtime  metoprolol tartrate 12.5 milliGRAM(s) Oral every 6 hours  midodrine. 15 milliGRAM(s) Oral three times a day  Nephro-debbie 1 Tablet(s) Oral daily  pantoprazole  Injectable 40 milliGRAM(s) IV Push daily  polyethylene glycol 3350 17 Gram(s) Oral daily  QUEtiapine 50 milliGRAM(s) Oral every 8 hours  senna 2 Tablet(s) Oral at bedtime  sodium bicarbonate 1300 milliGRAM(s) Oral two times a day    MEDICATIONS  (PRN):  acetaminophen    Suspension .. 650 milliGRAM(s) Oral every 6 hours PRN Temp greater or equal to 38C (100.4F), Mild Pain (1 - 3)  haloperidol    Injectable 2.5 milliGRAM(s) IntraMuscular every 8 hours PRN agitation  sodium chloride 0.9% lock flush 10 milliLiter(s) IV Push every 1 hour PRN Pre/post blood products, medications, blood draw, and to maintain line patency  sodium chloride 0.9% lock flush 10 milliLiter(s) IV Push every 1 hour PRN Pre/post blood products, medications, blood draw, and to maintain line patency  sodium chloride 0.9% lock flush 10 milliLiter(s) IV Push every 1 hour PRN Pre/post blood products, medications, blood draw, and to maintain line patency      RADIOLOGY & ADDITIONAL TESTS:

## 2021-05-20 NOTE — CHART NOTE - NSCHARTNOTEFT_GEN_A_CORE
Vascular Surgery brief note:    Patient seen and examined  L IJ shiley in place  Tolerated HD yesterday    -Continue to use L IJ shiley for HD  -Continue GOC conversation  -Call with questions/issues, will follow peripherally

## 2021-05-20 NOTE — PROGRESS NOTE ADULT - ASSESSMENT
L - IJc Poor Qb < 200 ml.,     Unable to sustain the Treatment,    HD Terminated,    D/W Nicky Rangel ( NP -  )

## 2021-05-20 NOTE — PROGRESS NOTE ADULT - PROBLEM SELECTOR PLAN 4
Patient unable to have HD secondary to low flow from Shiley.  Patient with limited access sites as discussed with Vascular.   Also Issues of agitation with patient pulling out lines making her not a good candidate for Shiley.  Will need to readdress with family further GOC Patient unable to have HD secondary to low flow from [facility].  Patient with limited access sites as discussed with Vascular.   Also Issues of agitation with patient pulling out lines making her not a good candidate for Shiley or permcath.  Will need to readdress with family further [facility]    Informed daughter in law Frederick of above. She asked me to speak to speak to her son,  for which I also informed him of the issues above. Discussed consideration for hospice.   Family will discuss. We agreed to a 1pm meeting tomorrow.  Informed Dr. Bob

## 2021-05-20 NOTE — PROGRESS NOTE ADULT - SUBJECTIVE AND OBJECTIVE BOX
Patient in restraints, occasionally fighting them.  Had discussion with NP about care and as per review of meds, patient has received limited medications and limited PO food intake.  Given her limited ability to take meds, behavior as a result continues to warrant need for restraints.    REVIEW OF SYSTEMS  Constitutional - No fever,  + fatigue  Neurological - +loss of strength    FUNCTIONAL PROGRESS  Total    VITALS  T(C): 36.7 (05-20-21 @ 05:04), Max: 37.1 (05-19-21 @ 19:00)  HR: 108 (05-20-21 @ 05:04) (94 - 108)  BP: 142/73 (05-20-21 @ 05:04) (97/54 - 142/73)  RR: 18 (05-20-21 @ 05:04) (18 - 20)  SpO2: 100% (05-20-21 @ 05:04) (97% - 100%)  Wt(kg): --    MEDICATIONS   acetaminophen    Suspension .. 650 milliGRAM(s) every 6 hours PRN  aspirin  chewable 81 milliGRAM(s) daily  atorvastatin 40 milliGRAM(s) at bedtime  chlorhexidine 4% Liquid 1 Application(s) <User Schedule>  chlorhexidine 4% Liquid 1 Application(s) <User Schedule>  chlorhexidine 4% Liquid 1 Application(s) <User Schedule>  dextrose 40% Gel 15 Gram(s) once  dextrose 5%. 1000 milliLiter(s) <Continuous>  dextrose 5%. 1000 milliLiter(s) <Continuous>  dextrose 50% Injectable 25 Gram(s) once  dextrose 50% Injectable 12.5 Gram(s) once  enoxaparin Injectable 90 milliGRAM(s) daily  epoetin analilia-epbx (RETACRIT) Injectable 38085 Unit(s) <User Schedule>  glucagon  Injectable 1 milliGRAM(s) once  haloperidol    Injectable 2.5 milliGRAM(s) every 8 hours PRN  insulin lispro (ADMELOG) corrective regimen sliding scale   Before meals and at bedtime  melatonin 5 milliGRAM(s) at bedtime  metoprolol tartrate 12.5 milliGRAM(s) every 6 hours  midodrine. 15 milliGRAM(s) three times a day  Nephro-debbie 1 Tablet(s) daily  pantoprazole  Injectable 40 milliGRAM(s) daily  polyethylene glycol 3350 17 Gram(s) daily  QUEtiapine 50 milliGRAM(s) every 8 hours  senna 2 Tablet(s) at bedtime  sodium bicarbonate 1300 milliGRAM(s) two times a day  sodium chloride 0.9% lock flush 10 milliLiter(s) every 1 hour PRN  sodium chloride 0.9% lock flush 10 milliLiter(s) every 1 hour PRN  sodium chloride 0.9% lock flush 10 milliLiter(s) every 1 hour PRN      RECENT LABS/IMAGING                          9.6    9.08  )-----------( 223      ( 20 May 2021 06:57 )             33.1     05-20    140  |  101  |  36.0<H>  ----------------------------<  119<H>  4.4   |  23.0  |  6.76<H>    Ca    8.5<L>      20 May 2021 06:57  Mg     2.3     05-20    TPro  7.0  /  Alb  2.9<L>  /  TBili  0.4  /  DBili  x   /  AST  17  /  ALT  7   /  AlkPhos  76  05-20                  HEAD CT 5/3 - An evolving left-sided PCA distribution infarction is again seen with areas of gyral hyperattenuation. Findings may indicate superimposed petechial hemorrhagic transformation versus cortical laminar necrosis versus a combination of both. No new areas of acute intracranial hemorrhage are seen. Multiple small chronic infarcts are noted within the right cerebellar hemisphere. There is no hydrocephalus. There is diffuse cerebral volume loss with prominence of the sulci, fissures, and cisternal spaces which is normal for the patient's age. There is mild periventricular white matter hypoattenuation statistically compatible with microvascular changes given calcific atherosclerotic disease of the intracranial arteries. The paranasal sinuses and mastoid air cells are clear. The calvarium appears intact. The orbits appear unremarkable apart from cataract removal.    HEAD CT 5/11 - No significant interval change from 5/3/2021.    CXR 5/12 - Left IJ line with the tip in the right atrium and no pneumothorax, new. Mild, central pulmonary venous congestion, grossly unchanged    CXR 5/19 - LEFT IJ Shiley catheter tip abutting SVC lateral wall..  ----------------------------------------------------------------------------------------  PHYSICAL EXAM  Constitutional - NAD, Appears comfortable  Extremities - Bilateral wrist restraints/Mittens  Neurologic Exam -                    Cognitive - Keeps eyes closed     Communication - Moans    Psychiatric - Calm   ------------------------------------------------------------------------------------------------  ASSESSMENT/PLAN  71yFemale with functional deficits after developing an acute CVA  Left PCA CVA with hemorrhagic conversion - ASA, Lipitor  HTN - Lopressor  HypoTN - Midodrine  CRF - HD, s/p Venogram   Mood/Delirium - Seroquel 50mg Q8 (increased from 75/day 5/17), Melatonin 5mg HS (5/12), Haldol PRN  Pain - Tylenol  Oropharyngeal Dysphagia - Puree/Honey    DVT PPX - SCDs, Lovenox  Rehab - Given patient's limited PO of food and not having taking any medications, with bilateral wrist and mittens needed to restrain from removing HD access, patient's clinical course is significantly deteriorating to continue aggressive treatment. The need for addressing behavior to limit patient's interference with aggressive treatment would require her to be sedated and at this time, may need a NGT/PEG to continue with medication and diet.     Given above, continue to support Palliative but my need hospice.     Will sign off at this time. Thank you for allowing me to be part of your patient's care. Please reconsult PMR for additional rehab recommendations or dispo needs if functional status changes.     Discussed with rehab clinical care team.

## 2021-05-20 NOTE — PROGRESS NOTE ADULT - PROBLEM SELECTOR PLAN 2
Unable to do HD today 2/2 to poor flow from Shiley.   Patient has had multiple episodes of pulling out IV lines and Shiley.  Discussed with Vascular resident Mitesh who spoke to Dr. Martinez - patient not medically stable enough for fistulogram evaluation.  Patient agitated and has central stenosis thus making placement of Shiley difficult Unable to do HD today 2/2 to poor flow from Shiley.   Patient has had multiple episodes of pulling out IV lines and Shiley.  Discussed with Vascular resident Mitesh who spoke to Dr. Martinez - patient not medically stable enough for fistulogram evaluation.  Patient agitated and has central stenosis thus making placement of Shiley  and permcath difficult

## 2021-05-20 NOTE — PROGRESS NOTE ADULT - SUBJECTIVE AND OBJECTIVE BOX
CC:  follow up GOC  INTERVAL HPI/OVERNIGHT EVENTS:  events noted  HD terminate as Shiley with low flow - not properly functioning  PRESENT SYMPTOMS: SOURCE:  Patient/Family/Team    PAIN SCALE:  0 = none  1 = mild   2 = moderate  3 = severe    Pain: appears comfortable    Dyspnea:  [ ] YES [x ] NO  Anxiety:  [x ] YES [ ] NO  Fatigue: [ x YES [ ] NO  Nausea: [ ] YES [ ] NO  na  Loss of Appetite: [ x] YES [ ] NO  Other symptoms: __________    MEDICATIONS  (STANDING):  aspirin  chewable 81 milliGRAM(s) Oral daily  atorvastatin 40 milliGRAM(s) Oral at bedtime  chlorhexidine 4% Liquid 1 Application(s) Topical <User Schedule>  chlorhexidine 4% Liquid 1 Application(s) Topical <User Schedule>  chlorhexidine 4% Liquid 1 Application(s) Topical <User Schedule>  dextrose 40% Gel 15 Gram(s) Oral once  dextrose 5%. 1000 milliLiter(s) (50 mL/Hr) IV Continuous <Continuous>  dextrose 5%. 1000 milliLiter(s) (100 mL/Hr) IV Continuous <Continuous>  dextrose 50% Injectable 25 Gram(s) IV Push once  dextrose 50% Injectable 12.5 Gram(s) IV Push once  enoxaparin Injectable 90 milliGRAM(s) SubCutaneous daily  epoetin analilia-epbx (RETACRIT) Injectable 92947 Unit(s) SubCutaneous <User Schedule>  glucagon  Injectable 1 milliGRAM(s) IntraMuscular once  insulin lispro (ADMELOG) corrective regimen sliding scale   SubCutaneous Before meals and at bedtime  melatonin 5 milliGRAM(s) Oral at bedtime  metoprolol tartrate 12.5 milliGRAM(s) Oral every 6 hours  midodrine. 15 milliGRAM(s) Oral three times a day  Nephro-debbie 1 Tablet(s) Oral daily  pantoprazole  Injectable 40 milliGRAM(s) IV Push daily  polyethylene glycol 3350 17 Gram(s) Oral daily  QUEtiapine 50 milliGRAM(s) Oral every 8 hours  senna 2 Tablet(s) Oral at bedtime  sodium bicarbonate 1300 milliGRAM(s) Oral two times a day    MEDICATIONS  (PRN):  acetaminophen    Suspension .. 650 milliGRAM(s) Oral every 6 hours PRN Temp greater or equal to 38C (100.4F), Mild Pain (1 - 3)  haloperidol    Injectable 2.5 milliGRAM(s) IntraMuscular every 8 hours PRN agitation  sodium chloride 0.9% lock flush 10 milliLiter(s) IV Push every 1 hour PRN Pre/post blood products, medications, blood draw, and to maintain line patency  sodium chloride 0.9% lock flush 10 milliLiter(s) IV Push every 1 hour PRN Pre/post blood products, medications, blood draw, and to maintain line patency  sodium chloride 0.9% lock flush 10 milliLiter(s) IV Push every 1 hour PRN Pre/post blood products, medications, blood draw, and to maintain line patency      Allergies    ertapenem (Urticaria)  Purell (Rash)    Intolerances    Karnofsky Performance Score/Palliative Performance Status Version 2:   30   %    Vital Signs Last 24 Hrs  T(C): 36.5 (20 May 2021 12:26), Max: 37.1 (19 May 2021 19:00)  T(F): 97.7 (20 May 2021 12:26), Max: 98.7 (19 May 2021 19:00)  HR: 116 (20 May 2021 12:26) (94 - 116)  BP: 133/74 (20 May 2021 12:26) (97/54 - 142/73)  BP(mean): --  RR: 18 (20 May 2021 12:26) (17 - 20)  SpO2: 98% (20 May 2021 12:26) (97% - 100%)    PHYSICAL EXAM:    General: Elderly woman, confused NAD    HEENT: [ x] normal  [ ] dry mouth  [ ] ET tube/trach    Lungs: [x ] comfortable [ ] tachypnea/labored breathing  [ ] excessive secretions    CV: [x ] normal  [ ] tachycardia    GI: [ x] normal  [ ] distended  [ ] tender  [ ] no BS               [ ] PEG/NG/OG tube    : [x] normal  [x ] incontinent  [ ] oliguria/anuria  [ ] aguilar    MSK: [ ] normal  [ x] weakness  [ ] edema             [ ] ambulatory  [ ] bedbound/wheelchair bound    Skin: [ x normal  [ ] pressure ulcers- Stage_____  [ ] no rash    LABS:                        9.6    9.08  )-----------( 223      ( 20 May 2021 06:57 )             33.1     05-20    140  |  101  |  36.0<H>  ----------------------------<  119<H>  4.4   |  23.0  |  6.76<H>    Ca    8.5<L>      20 May 2021 06:57  Mg     2.3     05-20    TPro  7.0  /  Alb  2.9<L>  /  TBili  0.4  /  DBili  x   /  AST  17  /  ALT  7   /  AlkPhos  76  05-20        I&O's Summary    19 May 2021 07:01  -  20 May 2021 07:00  --------------------------------------------------------  IN: 0 mL / OUT: 0 mL / NET: 0 mL    20 May 2021 07:01  -  20 May 2021 14:49  --------------------------------------------------------  IN: 600 mL / OUT: 200 mL / NET: 400 mL        RADIOLOGY & ADDITIONAL STUDIES:

## 2021-05-20 NOTE — PROGRESS NOTE ADULT - ASSESSMENT
70 y/o female with PMHx CAD s/p stents '07, HTN, MI, PAF, liver abscess, s/p biliary stent with removal (11/2018; 7/2/19), chronic pancreatitis, DM2 on insulin, diabetic neuropathy, ESRD (5/2018) on HD (M/W/F since 5/18) presents to [facility] as a transfer from [place] after she went to HD with and had  an episode of syncope and change in mental status. HD RN reported she became unresponsive, L gaze preference, and was perseverating. Patient went for emergent CTH which revealed evolving L posterior cerebral artery infarct with new hemorrhage. Patient was subsequently intubated due to concern for deteriorating. Patient was then transferred to [facility].     Of note, patient was recently admitted to [place] on 4/12/21 for hypoxic/hypercapnic resp failure and hyperkalemia secondary to ESRD. While admitted pt was found to be confused, MRI obtained revealed large acute infarct of the left PCA. Pt was originally on Asprin which was changed to Plavix. Patient was discharged to rehab on Plavix.    Patient brought to NSICU and seen by nephro and continued on HD. Patient was extubated on 5/1. Patient seen by vascular for low flow out of the fistula and had a left femoral placed for HD. Patient downgraded to medical floor. Patients course complicated by multiple periods of pulling IV lines and the original left IJ. Family meeting attempted and family wants full code.      patient pulled her second left IJ on 5.14  patient also pulled her peripheral lines   5.17 midline placed today   5/18- overnight, she pulled midline, placed another one today     5/19 - permacath cancelled  5/19- third [facility], HD done, ethics consulted  5/20 - unable to complete HD    #Acute metabolic encephalopathy in setting of dementia - secondary to multiple cvas in the past month  asa and statin   depakote stopped, c/w seroquel   neurology following  ct head repeated on 5.11 - stable cva  ammonia, tsh WNL  - supportive care  seroquel     #CVA - in setting of afib  - lovenox resumed    #dm2 - elevated a1c 8.7  c.w ssi    #afib -   Continue metoprolol 12.5mg qid  no dig as per EP   resume lovenox     #esrd - renal following   vascular states, patient is too unstable for permacath, I agree     prognosis poor   palliative following  - family wants full code,   - dispo unclear.

## 2021-05-20 NOTE — PROGRESS NOTE ADULT - SUBJECTIVE AND OBJECTIVE BOX
Patient was seen and evaluated on dialysis.     Remains Confused, Restless,    T(C): 36.6 (05-20-21 @ 09:40), Max: 37.1 (05-19-21 @ 19:00)  HR: 107 (05-20-21 @ 09:40) (94 - 108)  BP: 141/32 (05-20-21 @ 09:40) (97/54 - 142/73)  Wt(kg): --NA,  PE ;  Restless, Pale,   lungs - Coarse BS,  CV gr 1 murmur,  No gallop or rub  Abd : soft, NT BS +, No masses  Ext- No edema  Neuro : Grossly intact, moving extremities .  Demented,                      9.6    9.08  )-----------( 223      ( 20 May 2021 06:57 )             33.1        05-20    140  |  101  |  36.0<H>  ----------------------------<  119<H>  4.4   |  23.0  |  6.76<H>    Ca    8.5<L>      20 May 2021 06:57  Mg     2.3     05-20    TPro  7.0  /  Alb  2.9<L>  /  TBili  0.4  /  DBili  x   /  AST  17  /  ALT  7   /  AlkPhos  76  05-20      MEDICATIONS  (STANDING):  acetaminophen    Suspension .. PRN  aspirin  chewable  atorvastatin  chlorhexidine 4% Liquid  chlorhexidine 4% Liquid  chlorhexidine 4% Liquid  dextrose 40% Gel  dextrose 5%.  dextrose 5%.  dextrose 50% Injectable  dextrose 50% Injectable  enoxaparin Injectable  epoetin analilia-epbx (RETACRIT) Injectable  glucagon  Injectable  haloperidol    Injectable PRN  insulin lispro (ADMELOG) corrective regimen sliding scale  melatonin  metoprolol tartrate  midodrine.  Nephro-debbie  pantoprazole  Injectable  polyethylene glycol 3350  QUEtiapine  senna  sodium bicarbonate  sodium chloride 0.9% lock flush PRN  sodium chloride 0.9% lock flush PRN  sodium chloride 0.9% lock flush PRN

## 2021-05-20 NOTE — CHART NOTE - NSCHARTNOTEFT_GEN_A_CORE
Source: Patient [ ]  Family [ ]   other [x ]EMR    Current Diet: Puree with nectar consistency  calorie count in progress starting today    PO intake:  < 50% [x ]   50-75%  [ ]   %  [ ]  other :    Source for PO intake [ ] Patient [ ] family [x ] chart [ ] staff [ ] other    Enteral /Parenteral Nutrition:     Current Weight: 177.4# 5/14  203.4# (4/29/21)  364.8# (4/30/21)  365.3# (5/1/21)  363.3# (5/3/21)  190.2# (5/5/21)  189.8# (5/7/21)  187.8# (5/12/21)  178.5# (5/14/21)    generalized 1 +    % Weight Change     Pertinent Medications: MEDICATIONS  (STANDING):  aspirin  chewable 81 milliGRAM(s) Oral daily  atorvastatin 40 milliGRAM(s) Oral at bedtime  chlorhexidine 4% Liquid 1 Application(s) Topical <User Schedule>  chlorhexidine 4% Liquid 1 Application(s) Topical <User Schedule>  chlorhexidine 4% Liquid 1 Application(s) Topical <User Schedule>  dextrose 40% Gel 15 Gram(s) Oral once  dextrose 5%. 1000 milliLiter(s) (50 mL/Hr) IV Continuous <Continuous>  dextrose 5%. 1000 milliLiter(s) (100 mL/Hr) IV Continuous <Continuous>  dextrose 50% Injectable 25 Gram(s) IV Push once  dextrose 50% Injectable 12.5 Gram(s) IV Push once  enoxaparin Injectable 90 milliGRAM(s) SubCutaneous daily  epoetin analilia-epbx (RETACRIT) Injectable 95803 Unit(s) SubCutaneous <User Schedule>  glucagon  Injectable 1 milliGRAM(s) IntraMuscular once  insulin lispro (ADMELOG) corrective regimen sliding scale   SubCutaneous Before meals and at bedtime  melatonin 5 milliGRAM(s) Oral at bedtime  metoprolol tartrate 12.5 milliGRAM(s) Oral every 6 hours  midodrine. 15 milliGRAM(s) Oral three times a day  Nephro-debbie 1 Tablet(s) Oral daily  pantoprazole  Injectable 40 milliGRAM(s) IV Push daily  polyethylene glycol 3350 17 Gram(s) Oral daily  QUEtiapine 50 milliGRAM(s) Oral every 8 hours  senna 2 Tablet(s) Oral at bedtime  sodium bicarbonate 1300 milliGRAM(s) Oral two times a day    MEDICATIONS  (PRN):  acetaminophen    Suspension .. 650 milliGRAM(s) Oral every 6 hours PRN Temp greater or equal to 38C (100.4F), Mild Pain (1 - 3)  haloperidol    Injectable 2.5 milliGRAM(s) IntraMuscular every 8 hours PRN agitation  sodium chloride 0.9% lock flush 10 milliLiter(s) IV Push every 1 hour PRN Pre/post blood products, medications, blood draw, and to maintain line patency  sodium chloride 0.9% lock flush 10 milliLiter(s) IV Push every 1 hour PRN Pre/post blood products, medications, blood draw, and to maintain line patency  sodium chloride 0.9% lock flush 10 milliLiter(s) IV Push every 1 hour PRN Pre/post blood products, medications, blood draw, and to maintain line patency    Pertinent Labs: CBC Full  -  ( 20 May 2021 06:57 )  WBC Count : 9.08 K/uL  RBC Count : 3.21 M/uL  Hemoglobin : 9.6 g/dL  Hematocrit : 33.1 %  Platelet Count - Automated : 223 K/uL  Mean Cell Volume : 103.1 fl  Mean Cell Hemoglobin : 29.9 pg  Mean Cell Hemoglobin Concentration : 29.0 gm/dL  Auto Neutrophil # : 4.75 K/uL  Auto Lymphocyte # : 2.96 K/uL  Auto Monocyte # : 1.04 K/uL  Auto Eosinophil # : 0.14 K/uL  Auto Basophil # : 0.07 K/uL  Auto Neutrophil % : 52.3 %  Auto Lymphocyte % : 32.6 %  Auto Monocyte % : 11.5 %  Auto Eosinophil % : 1.5 %  Auto Basophil % : 0.8 %      05-20 Na140 mmol/L Glu 119 mg/dL<H> K+ 4.4 mmol/L Cr  6.76 mg/dL<H> BUN 36.0 mg/dL<H> Phos n/a   Alb 2.9 g/dL<L> PAB n/a           Skin:     Nutrition focused physical exam not conducted - found signs of malnutrition [ ]absent [ ]present    Subcutaneous fat loss: [ ] Orbital fat pads region, [ ]Buccal fat region, [ ]Triceps region,  [ ]Ribs region    Muscle wasting: [ ]Temples region, [ ]Clavicle region, [ ]Shoulder region, [ ]Scapula region, [ ]Interosseous region,  [ ]thigh region, [ ]Calf region    Estimated Needs:   [x ] no change since previous assessment  [ ] recalculated:     Current Nutrition Diagnosis:  Pt remains at high nutrition risk secondary to malnutrition (moderate acute) related to inability to consume sufficient protein-energy needs 2/2 ESRD on HD, s/p SDH as evidenced by meeting <50% EER x 5 days, generalized edema. Pt on dysphagia diet. Palliative care following. RD to remain available.    Recommendations:   1: Calorie Count in progress  2. Provide assistance at meals and encourage PO intake.  3. Recommend Nepro (Nectar consistency as per SLP recommendation) TID to optimize PO intake  4. Monitor weights  5. Monitor labs  6. Continue Nephro-debbie daily        Monitoring and Evaluation:   [ x] PO intake [x ] Tolerance to diet prescription [X] Weights  [X] Follow up per protocol [X] Labs:

## 2021-05-20 NOTE — PROGRESS NOTE ADULT - ASSESSMENT
71 year old woman  PMHx CAD s/p stents '07, HTN, MI, PAF, ESRD, liver abscess, s/p biliary stent with removal (11/2018; 7/2/19), chronic pancreatitis, DM2  transferred from [facility] with  hx PCA CVA with hemorrhagic conversion. Hospital course complicated by agitation with multiple episodes of pulling out shiley

## 2021-05-21 LAB
ALBUMIN SERPL ELPH-MCNC: 2.8 G/DL — LOW (ref 3.3–5.2)
ALP SERPL-CCNC: 77 U/L — SIGNIFICANT CHANGE UP (ref 40–120)
ALT FLD-CCNC: 6 U/L — SIGNIFICANT CHANGE UP
ANION GAP SERPL CALC-SCNC: 16 MMOL/L — SIGNIFICANT CHANGE UP (ref 5–17)
AST SERPL-CCNC: 26 U/L — SIGNIFICANT CHANGE UP
BILIRUB SERPL-MCNC: 0.4 MG/DL — SIGNIFICANT CHANGE UP (ref 0.4–2)
BUN SERPL-MCNC: 38 MG/DL — HIGH (ref 8–20)
CALCIUM SERPL-MCNC: 8.8 MG/DL — SIGNIFICANT CHANGE UP (ref 8.6–10.2)
CHLORIDE SERPL-SCNC: 101 MMOL/L — SIGNIFICANT CHANGE UP (ref 98–107)
CO2 SERPL-SCNC: 20 MMOL/L — LOW (ref 22–29)
CREAT SERPL-MCNC: 7.62 MG/DL — HIGH (ref 0.5–1.3)
GLUCOSE BLDC GLUCOMTR-MCNC: 123 MG/DL — HIGH (ref 70–99)
GLUCOSE BLDC GLUCOMTR-MCNC: 143 MG/DL — HIGH (ref 70–99)
GLUCOSE BLDC GLUCOMTR-MCNC: 147 MG/DL — HIGH (ref 70–99)
GLUCOSE BLDC GLUCOMTR-MCNC: 155 MG/DL — HIGH (ref 70–99)
GLUCOSE SERPL-MCNC: 119 MG/DL — HIGH (ref 70–99)
MAGNESIUM SERPL-MCNC: 2.4 MG/DL — SIGNIFICANT CHANGE UP (ref 1.6–2.6)
POTASSIUM SERPL-MCNC: 4.9 MMOL/L — SIGNIFICANT CHANGE UP (ref 3.5–5.3)
POTASSIUM SERPL-SCNC: 4.9 MMOL/L — SIGNIFICANT CHANGE UP (ref 3.5–5.3)
PROT SERPL-MCNC: 7.1 G/DL — SIGNIFICANT CHANGE UP (ref 6.6–8.7)
SODIUM SERPL-SCNC: 137 MMOL/L — SIGNIFICANT CHANGE UP (ref 135–145)

## 2021-05-21 PROCEDURE — 99233 SBSQ HOSP IP/OBS HIGH 50: CPT

## 2021-05-21 PROCEDURE — 99497 ADVNCD CARE PLAN 30 MIN: CPT | Mod: 25

## 2021-05-21 RX ORDER — HYDROMORPHONE HYDROCHLORIDE 2 MG/ML
0.5 INJECTION INTRAMUSCULAR; INTRAVENOUS; SUBCUTANEOUS EVERY 6 HOURS
Refills: 0 | Status: DISCONTINUED | OUTPATIENT
Start: 2021-05-21 | End: 2021-05-23

## 2021-05-21 RX ADMIN — Medication 5 MILLIGRAM(S): at 21:49

## 2021-05-21 RX ADMIN — ENOXAPARIN SODIUM 90 MILLIGRAM(S): 100 INJECTION SUBCUTANEOUS at 11:51

## 2021-05-21 RX ADMIN — CHLORHEXIDINE GLUCONATE 1 APPLICATION(S): 213 SOLUTION TOPICAL at 05:32

## 2021-05-21 RX ADMIN — SODIUM CHLORIDE 10 MILLILITER(S): 9 INJECTION INTRAMUSCULAR; INTRAVENOUS; SUBCUTANEOUS at 12:34

## 2021-05-21 RX ADMIN — ATORVASTATIN CALCIUM 40 MILLIGRAM(S): 80 TABLET, FILM COATED ORAL at 21:49

## 2021-05-21 RX ADMIN — QUETIAPINE FUMARATE 50 MILLIGRAM(S): 200 TABLET, FILM COATED ORAL at 21:49

## 2021-05-21 RX ADMIN — ERYTHROPOIETIN 10000 UNIT(S): 10000 INJECTION, SOLUTION INTRAVENOUS; SUBCUTANEOUS at 18:27

## 2021-05-21 RX ADMIN — SENNA PLUS 2 TABLET(S): 8.6 TABLET ORAL at 21:49

## 2021-05-21 NOTE — PROGRESS NOTE ADULT - ASSESSMENT
71F PMHx CAD s/p stents '07, HTN, MI, PAF, liver abscess, s/p biliary stent with removal (11/2018; 7/2/19), chronic pancreatitis, DM2 on insulin, diabetic neuropathy, ESRD (5/2018) on HD (M/W/F since 5/18) transferred from [place] after she went to HD with and had  an episode of syncope and change in mental status.  Noted to have malfunctioning LUE AVF.    - not a candidate for fistulagram at this time  - at risk for pulling Temporary Shiley if was to be replaced  - Palliative working with Family to establish GOC, to meet 1pm today.  await results of this meeting before replacing  71F PMHx CAD s/p stents '07, HTN, MI, PAF, liver abscess, s/p biliary stent with removal (11/2018; 7/2/19), chronic pancreatitis, DM2 on insulin, diabetic neuropathy, ESRD (5/2018) on HD (M/W/F since 5/18) transferred from [place] after she went to HD with and had  an episode of syncope and change in mental status.  Noted to have malfunctioning LUE AVF.    - not a candidate for fistulagram at this time  - at risk for pulling Temporary Shiley if was to be replaced in Groin, upper access very difficult to achieve and may result in non-working access again if able to obtain at all.  - Palliative working with Family to establish GOC, to meet 1pm today.

## 2021-05-21 NOTE — PROGRESS NOTE ADULT - ASSESSMENT
1) ESRD on HD  2) MBD of renal dx  3) Anemia of renal dx  4) Vol HTN    Unable to tolerate HD  Pt is not a candidate for further HD  Recommend palliative care / hospice services;    melinda Bob

## 2021-05-21 NOTE — PROGRESS NOTE ADULT - ASSESSMENT
#Acute metabolic encephalopathy in setting of dementia - secondary to multiple cvas in the past month  asa and statin   c/w seroquel   ct head repeated on 5.11 - stable cva  ammonia, tsh WNL  - supportive care    #CVA - in setting of afib  - lovenox  - statin     #dm2 - elevated a1c 8.7  c.w ssi    #afib -   Continue metoprolol 12.5mg qid  no dig as per EP   resume lovenox     #esrd - renal following   vascular states, patient is too unstable for permacath,   current left IJ not working  - no longer able to get HD, family aware    prognosis poor   dnr dni  - pallaitvie following  - will place hospice consult

## 2021-05-21 NOTE — PROGRESS NOTE ADULT - SUBJECTIVE AND OBJECTIVE BOX
CC:  follow up gOC  INTERVAL HPI/OVERNIGHT EVENTS:  Discussed with Kerline Chappell NP who spoke to Nephrology Dr. Rivera - patient would need to be on a vent if to even have HD  Discussed again with vascular - very difficult to replace Edison as patient with central stenosis.   PRESENT SYMPTOMS: SOURCE:  Patient/Family/Team    PAIN SCALE:  0 = none  1 = mild   2 = moderate  3 = severe    Pain: unable to fully assess - no sign     Dyspnea:  [ ] YES [x ] NO  Anxiety:  [ ] YES [x ] NO  Fatigue: [ x] YES [ ] NO  Nausea: [ ] YES [ ] NO  na  Loss of Appetite: [ ] YES [ ] NO npo  Other symptoms: __________    MEDICATIONS  (STANDING):  aspirin  chewable 81 milliGRAM(s) Oral daily  atorvastatin 40 milliGRAM(s) Oral at bedtime  chlorhexidine 4% Liquid 1 Application(s) Topical <User Schedule>  chlorhexidine 4% Liquid 1 Application(s) Topical <User Schedule>  chlorhexidine 4% Liquid 1 Application(s) Topical <User Schedule>  dextrose 40% Gel 15 Gram(s) Oral once  dextrose 5%. 1000 milliLiter(s) (50 mL/Hr) IV Continuous <Continuous>  dextrose 5%. 1000 milliLiter(s) (100 mL/Hr) IV Continuous <Continuous>  dextrose 50% Injectable 25 Gram(s) IV Push once  dextrose 50% Injectable 12.5 Gram(s) IV Push once  enoxaparin Injectable 90 milliGRAM(s) SubCutaneous daily  epoetin analilia-epbx (RETACRIT) Injectable 33854 Unit(s) SubCutaneous <User Schedule>  glucagon  Injectable 1 milliGRAM(s) IntraMuscular once  insulin lispro (ADMELOG) corrective regimen sliding scale   SubCutaneous Before meals and at bedtime  melatonin 5 milliGRAM(s) Oral at bedtime  metoprolol tartrate 12.5 milliGRAM(s) Oral every 6 hours  midodrine. 15 milliGRAM(s) Oral three times a day  Nephro-debbie 1 Tablet(s) Oral daily  pantoprazole  Injectable 40 milliGRAM(s) IV Push daily  polyethylene glycol 3350 17 Gram(s) Oral daily  QUEtiapine 50 milliGRAM(s) Oral every 8 hours  senna 2 Tablet(s) Oral at bedtime  sodium bicarbonate 1300 milliGRAM(s) Oral two times a day    MEDICATIONS  (PRN):  acetaminophen    Suspension .. 650 milliGRAM(s) Oral every 6 hours PRN Temp greater or equal to 38C (100.4F), Mild Pain (1 - 3)  haloperidol    Injectable 2.5 milliGRAM(s) IntraMuscular every 8 hours PRN agitation  sodium chloride 0.9% lock flush 10 milliLiter(s) IV Push every 1 hour PRN Pre/post blood products, medications, blood draw, and to maintain line patency  sodium chloride 0.9% lock flush 10 milliLiter(s) IV Push every 1 hour PRN Pre/post blood products, medications, blood draw, and to maintain line patency  sodium chloride 0.9% lock flush 10 milliLiter(s) IV Push every 1 hour PRN Pre/post blood products, medications, blood draw, and to maintain line patency      Allergies    ertapenem (Urticaria)  Purell (Rash)    Intolerances    Karnofsky Performance Score/Palliative Performance Status Version 2:   20  %    Vital Signs Last 24 Hrs  T(C): 36.5 (21 May 2021 10:10), Max: 37.1 (21 May 2021 04:13)  T(F): 97.7 (21 May 2021 10:10), Max: 98.8 (21 May 2021 04:13)  HR: 83 (21 May 2021 10:10) (83 - 100)  BP: 106/70 (21 May 2021 10:10) (96/62 - 114/56)  BP(mean): --  RR: 18 (21 May 2021 10:10) (18 - 19)  SpO2: 94% (21 May 2021 10:10) (94% - 100%)    PHYSICAL EXAM:    General: F lethargic NAD  HEENT: [x ] normal  [ ] dry mouth  [ ] ET tube/trach  Shiley intact    Lungs: [ x] comfortable [ ] tachypnea/labored breathing  [ ] excessive secretions    CV: [ x] normal  [ ] tachycardia    GI: [x ] normal  [ ] distended  [ ] tender  [ ] no BS               [ ] PEG/NG/OG tube    : [ ] normal  [ ] incontinent  [ x] oliguria/anuria  [ ] aguilar    MSK: [ ] normal  [ x] weakness  [ ] edema             [ ] ambulatory  [ x] bedbound/wheelchair bound    Skin: [ ] normal  [ ] pressure ulcers- Stage_____  [x ] no rash    LABS:                        9.6    9.08  )-----------( 223      ( 20 May 2021 06:57 )             33.1     05-21    137  |  101  |  38.0<H>  ----------------------------<  119<H>  4.9   |  20.0<L>  |  7.62<H>    Ca    8.8      21 May 2021 07:58  Mg     2.4     05-21    TPro  7.1  /  Alb  2.8<L>  /  TBili  0.4  /  DBili  x   /  AST  26  /  ALT  6   /  AlkPhos  77  05-21        I&O's Summary    20 May 2021 07:01  -  21 May 2021 07:00  --------------------------------------------------------  IN: 600 mL / OUT: 200 mL / NET: 400 mL        RADIOLOGY & ADDITIONAL STUDIES:

## 2021-05-21 NOTE — PROGRESS NOTE ADULT - ASSESSMENT
71 year old woman  PMHx CAD s/p stents '07, HTN, MI, PAF, ESRD, liver abscess, s/p biliary stent with removal (11/2018; 7/2/19), chronic pancreatitis, DM2  transferred from [facility] with  hx PCA CVA with hemorrhagic conversion. Hospital course complicated by agitation with multiple episodes of pulling out Shiley     Problem/Plan - 1:  ·  Problem: CVA (cerebral vascular accident).  Plan: ASA, statin.      Problem/Plan - 2:  ·  Problem: ESRD (end stage renal disease) on dialysis.  Plan: Unable to do HD today 2/2 to poor flow from Shiley.   Patient has had multiple episodes of pulling out IV lines and Shiley.  Discussed with Vascular resident Mitesh who spoke to Dr. Martinez - patient not medically stable enough for fistulogram evaluation.  Patient agitated and has central stenosis thus making placement of Shiley and permcath difficult.Per Nephrology - patient NOT a candidate for HD     Problem/Plan - 3:  ·  Problem: Agitation.    Plan: Patient on Seroquel - has not taken, poor mental status  Haldol IM PRN  Can consider Ativan 1mg IV PRN      Problem/Plan - 4:  ·  Problem: Encounter for palliative care.    Per review of  previous Palliative notes - Son and his wife Frederick are surrogates  Plan: Was to have family meeting today at 1pm - NO SHOW.    Called daughter in law Frederick.  She is not feeling well and  is not home.  Another grandson will be coming.  However, he is not the surrogate.   Called back Frederick and pressed importance of having further discussions as patient's condition in a  precipice state-  unable to have HD and take po meds.     See [facility] note for details. In summary  1. DNR/I - MOLST completed.   2. No NGT    For now, monitor for symptoms of dyspnea, pain as patient not undergoing further HD. Would recommend Dilaudid and Ativan PRN  Will try to move towards comfort care.  Frederick informs us that patient's daughter will be coming on 5/23     71 year old woman  PMHx CAD s/p stents '07, HTN, MI, PAF, ESRD, liver abscess, s/p biliary stent with removal (11/2018; 7/2/19), chronic pancreatitis, DM2  transferred from [facility] with  hx PCA CVA with hemorrhagic conversion. Hospital course complicated by agitation with multiple episodes of pulling out Shiley     Problem/Plan - 1:  ·  Problem: CVA (cerebral vascular accident).  Plan: ASA, statin.      Problem/Plan - 2:  ·  Problem: ESRD (end stage renal disease) on dialysis.  Plan: Unable to do HD today 2/2 to poor flow from Shiley.   Patient has had multiple episodes of pulling out IV lines and Shiley.  Discussed with Vascular resident Mitesh who spoke to Dr. Martinez - patient not medically stable enough for fistulogram evaluation.  Patient agitated and has central stenosis thus making placement of Shiley and permcath difficult.Per Nephrology - patient NOT a candidate for HD     Problem/Plan - 3:  ·  Problem: Agitation.    Plan: Patient on Seroquel - has not taken, poor mental status  Haldol IM PRN  Can consider Ativan 1mg IV PRN      Problem/Plan - 4:  ·  Problem: Encounter for palliative care.    Per review of  previous Palliative notes - Son and his wife Frederick are surrogates  Plan: Was to have family meeting today at 1pm - NO SHOW.    Called daughter in law Frederick.  She is not feeling well and  is not home.  Another grandson will be coming.  However, he is not the surrogate.   Called back Frederick and pressed importance of having further discussions as patient's condition in a  precipice state-  unable to have HD and take po meds.     See [facility] note for details. In summary  1. DNR/I - MOLST completed.   2. No NGT    For now, monitor for symptoms of dyspnea, pain as patient not undergoing further HD. Added Dilaudid  0.5mg IVP PRN.  Can add Ativan PRN if needed.   Recommend to try to  move towards comfort care.  Frederick informs us that patient's daughter will be coming on 5/23

## 2021-05-21 NOTE — PROGRESS NOTE ADULT - SUBJECTIVE AND OBJECTIVE BOX
IRENE TAYLOR    [id number]    71y      Female    INTERVAL HPI/OVERNIGHT EVENTS:    off service note  Patient is a 72 y/o female with PMHx CAD s/p stents '07, HTN, MI, PAF, liver abscess, s/p biliary stent with removal (11/2018; 7/2/19), chronic pancreatitis, DM2 on insulin, diabetic neuropathy, ESRD (5/2018) on HD (M/W/F since 5/18) presents to [facility] as a transfer from [place] after she went to HD with and had  an episode of syncope and change in mental status. HD RN reported she became unresponsive, L gaze preference, and was perseverating. Patient went for emergent CTH which revealed evolving L posterior cerebral artery infarct with new hemorrhage. Patient was subsequently intubated due to concern for deteriorating. Patient was then transferred to [facility].     Of note, patient was recently admitted to [place] on 4/12/21 for hypoxic/hypercapnic resp failure and hyperkalemia secondary to ESRD. While admitted pt was found to be confused, MRI obtained revealed large acute infarct of the left PCA. Pt was originally on Asprin which was changed to Plavix. Patient was discharged to rehab on Plavix.    Patient brought to NSICU and seen by nephro and continued on HD. Patient was extubated on 5/1. Patient seen by vascular for low flow out of the fistula and had a left femoral placed for HD. Patient downgraded to medical floor. Patients course complicated by multiple periods of pulling IV lines and the original left IJ. Family meeting attempted and family wants full code initally       patient pulled her second left IJ on 5.14  patient also pulled all her peripheral lines   5.17 midline placed   5/18- overnight, she pulled midline, placed another one today     5/19 - permacath cancelled  5/19- third [facility], HD done, ethics consulted  5/20 - unable to complete HD due to malfunctioning left IJ  5/21- another family meeting, dnr dni signed, no ng tube, conservative measures     REVIEW OF SYSTEMS:    unable to obtain secondary to mental status     Vital Signs Last 24 Hrs  T(C): 36.8 (21 May 2021 16:34), Max: 37.1 (21 May 2021 04:13)  T(F): 98.3 (21 May 2021 16:34), Max: 98.8 (21 May 2021 04:13)  HR: 103 (21 May 2021 16:34) (83 - 103)  BP: 106/54 (21 May 2021 16:34) (96/62 - 106/70)  BP(mean): --  RR: 18 (21 May 2021 16:34) (18 - 19)  SpO2: 98% (21 May 2021 16:34) (94% - 100%)    PHYSICAL EXAM:    GENEAL: restraints, confused  HEAD:  Atraumatic, Normocephalic  NECK: left ij  NERVOUS SYSTEM:  Awake  CHEST/LUNG: Clear to auscultation bilaterally  HEART: Regular rate and rhythm; No murmurs, rubs, or gallops  ABDOMEN: Soft, Nontender, Nondistended; Bowel sounds present  EXTREMITIES:  2+ Peripheral Pulses, No clubbing, cyanosis, or edema    LABS:                        9.6    9.08  )-----------( 223      ( 20 May 2021 06:57 )             33.1     05-21    137  |  101  |  38.0<H>  ----------------------------<  119<H>  4.9   |  20.0<L>  |  7.62<H>    Ca    8.8      21 May 2021 07:58  Mg     2.4     05-21    TPro  7.1  /  Alb  2.8<L>  /  TBili  0.4  /  DBili  x   /  AST  26  /  ALT  6   /  AlkPhos  77  05-21            MEDICATIONS  (STANDING):  aspirin  chewable 81 milliGRAM(s) Oral daily  atorvastatin 40 milliGRAM(s) Oral at bedtime  chlorhexidine 4% Liquid 1 Application(s) Topical <User Schedule>  chlorhexidine 4% Liquid 1 Application(s) Topical <User Schedule>  chlorhexidine 4% Liquid 1 Application(s) Topical <User Schedule>  dextrose 40% Gel 15 Gram(s) Oral once  dextrose 5%. 1000 milliLiter(s) (50 mL/Hr) IV Continuous <Continuous>  dextrose 5%. 1000 milliLiter(s) (100 mL/Hr) IV Continuous <Continuous>  dextrose 50% Injectable 25 Gram(s) IV Push once  dextrose 50% Injectable 12.5 Gram(s) IV Push once  enoxaparin Injectable 90 milliGRAM(s) SubCutaneous daily  epoetin analilia-epbx (RETACRIT) Injectable 23414 Unit(s) SubCutaneous <User Schedule>  glucagon  Injectable 1 milliGRAM(s) IntraMuscular once  insulin lispro (ADMELOG) corrective regimen sliding scale   SubCutaneous Before meals and at bedtime  melatonin 5 milliGRAM(s) Oral at bedtime  metoprolol tartrate 12.5 milliGRAM(s) Oral every 6 hours  midodrine. 15 milliGRAM(s) Oral three times a day  Nephro-debbie 1 Tablet(s) Oral daily  pantoprazole  Injectable 40 milliGRAM(s) IV Push daily  polyethylene glycol 3350 17 Gram(s) Oral daily  QUEtiapine 50 milliGRAM(s) Oral every 8 hours  senna 2 Tablet(s) Oral at bedtime  sodium bicarbonate 1300 milliGRAM(s) Oral two times a day    MEDICATIONS  (PRN):  acetaminophen    Suspension .. 650 milliGRAM(s) Oral every 6 hours PRN Temp greater or equal to 38C (100.4F), Mild Pain (1 - 3)  haloperidol    Injectable 2.5 milliGRAM(s) IntraMuscular every 8 hours PRN agitation  HYDROmorphone  Injectable 0.5 milliGRAM(s) IV Push every 6 hours PRN pain and or  dyspnea  sodium chloride 0.9% lock flush 10 milliLiter(s) IV Push every 1 hour PRN Pre/post blood products, medications, blood draw, and to maintain line patency  sodium chloride 0.9% lock flush 10 milliLiter(s) IV Push every 1 hour PRN Pre/post blood products, medications, blood draw, and to maintain line patency  sodium chloride 0.9% lock flush 10 milliLiter(s) IV Push every 1 hour PRN Pre/post blood products, medications, blood draw, and to maintain line patency      RADIOLOGY & ADDITIONAL TESTS:

## 2021-05-21 NOTE — PROGRESS NOTE ADULT - SUBJECTIVE AND OBJECTIVE BOX
INTERVAL HPI/OVERNIGHT EVENTS: [facility] no [facility] and pt. offers no complaints      MEDICATIONS  (STANDING):  aspirin  chewable 81 milliGRAM(s) Oral daily  atorvastatin 40 milliGRAM(s) Oral at bedtime  chlorhexidine 4% Liquid 1 Application(s) Topical <User Schedule>  chlorhexidine 4% Liquid 1 Application(s) Topical <User Schedule>  chlorhexidine 4% Liquid 1 Application(s) Topical <User Schedule>  dextrose 40% Gel 15 Gram(s) Oral once  dextrose 5%. 1000 milliLiter(s) (50 mL/Hr) IV Continuous <Continuous>  dextrose 5%. 1000 milliLiter(s) (100 mL/Hr) IV Continuous <Continuous>  dextrose 50% Injectable 25 Gram(s) IV Push once  dextrose 50% Injectable 12.5 Gram(s) IV Push once  enoxaparin Injectable 90 milliGRAM(s) SubCutaneous daily  epoetin analilia-epbx (RETACRIT) Injectable 86886 Unit(s) SubCutaneous <User Schedule>  glucagon  Injectable 1 milliGRAM(s) IntraMuscular once  insulin lispro (ADMELOG) corrective regimen sliding scale   SubCutaneous Before meals and at bedtime  melatonin 5 milliGRAM(s) Oral at bedtime  metoprolol tartrate 12.5 milliGRAM(s) Oral every 6 hours  midodrine. 15 milliGRAM(s) Oral three times a day  Nephro-debbie 1 Tablet(s) Oral daily  pantoprazole  Injectable 40 milliGRAM(s) IV Push daily  polyethylene glycol 3350 17 Gram(s) Oral daily  QUEtiapine 50 milliGRAM(s) Oral every 8 hours  senna 2 Tablet(s) Oral at bedtime  sodium bicarbonate 1300 milliGRAM(s) Oral two times a day    MEDICATIONS  (PRN):  acetaminophen    Suspension .. 650 milliGRAM(s) Oral every 6 hours PRN Temp greater or equal to 38C (100.4F), Mild Pain (1 - 3)  haloperidol    Injectable 2.5 milliGRAM(s) IntraMuscular every 8 hours PRN agitation  sodium chloride 0.9% lock flush 10 milliLiter(s) IV Push every 1 hour PRN Pre/post blood products, medications, blood draw, and to maintain line patency  sodium chloride 0.9% lock flush 10 milliLiter(s) IV Push every 1 hour PRN Pre/post blood products, medications, blood draw, and to maintain line patency  sodium chloride 0.9% lock flush 10 milliLiter(s) IV Push every 1 hour PRN Pre/post blood products, medications, blood draw, and to maintain line patency      Vital Signs Last 24 Hrs  T(C): 36.5 (21 May 2021 10:10), Max: 37.1 (21 May 2021 04:13)  T(F): 97.7 (21 May 2021 10:10), Max: 98.8 (21 May 2021 04:13)  HR: 83 (21 May 2021 10:10) (83 - 116)  BP: 106/70 (21 May 2021 10:10) (96/62 - 133/74)  BP(mean): --  RR: 18 (21 May 2021 10:10) (18 - 19)  SpO2: 94% (21 May 2021 10:10) (94% - 100%)    PHYSICAL EXAM:      Constitutional: NAD    Neck: Shiley dressing intact, no signs of infection or pt. tampering    Respiratory: no accessory muscle use or conversational dyspnea    Cardiovascular: RRR    Gastrointestinal: soft, NT/ND          I&O's Detail    20 May 2021 07:01  -  21 May 2021 07:00  --------------------------------------------------------  IN:    Other (mL): 600 mL  Total IN: 600 mL    OUT:    Other (mL): 200 mL  Total OUT: 200 mL    Total NET: 400 mL          LABS:                        9.6    9.08  )-----------( 223      ( 20 May 2021 06:57 )             33.1     05-21    137  |  101  |  38.0<H>  ----------------------------<  119<H>  4.9   |  20.0<L>  |  7.62<H>    Ca    8.8      21 May 2021 07:58  Mg     2.4     05-21    TPro  7.1  /  Alb  2.8<L>  /  TBili  0.4  /  DBili  x   /  AST  26  /  ALT  6   /  AlkPhos  77  05-21          RADIOLOGY & ADDITIONAL STUDIES:

## 2021-05-21 NOTE — GOALS OF CARE CONVERSATION - ADVANCED CARE PLANNING - CONVERSATION DETAILS
Called Frederick, daughter in law and surrogate.  Discussed patient's overall condition and very limited treatment options.  Patient not able to have HD as her cathter site not functioning, Per discussion with vascular,  unable to replace secondary to stenosis and not medically stable for fistulogram.   Patient has pulled out many IV lines, Shiley thus, would not be good candidate for permcath and NGT.    Asked Frederick to consider mother in laws wishes,  given the multiple interventions/lines placed and continually pulling it out.  Discussed advance directives, CPR, intubation, mechanical ventilation, its risks and benefits.  Limited benefit if to be resuscitate and intubated given her comorbid medical issues.   Discussed NGT - risks/benefits.    Frederick agreed to DNR/I.  She does not want to put her through such an intervention.  Wishes for her to have a natural passing  Also does NOT want to burden her with an NGT Called Frederick, daughter in law and surrogate.  Discussed patient's overall condition, very limited treatment options and poor prognosis .  Patient not able to have HD as her cathter site not functioning, Per discussion with vascular,  unable to replace secondary to stenosis and not medically stable for fistulogram.   Patient has pulled out many IV lines, Shiley thus, would not be good candidate for permcath and NGT.    Asked Frederick to consider mother in laws wishes,  given the multiple interventions/lines placed and continually pulling it out.  Discussed advance directives, CPR, intubation, mechanical ventilation, its risks and benefits.  Limited benefit if to be resuscitate and intubated given her comorbid medical issues.   Discussed NGT - risks/benefits.    Frederick agreed to DNR/I.  She does not want to put her through such an intervention.  Wishes for her to have a natural passing  Also does NOT want to burden her with an NGT Called Frederick, daughter in law and surrogate.  Discussed patient's overall condition, very limited treatment options and poor prognosis .  Patient not able to have HD as her cathter site not functioning, Per discussion with vascular,  unable to replace secondary to stenosis and not medically stable for fistulogram.   Patient has pulled out many IV lines, Shiley thus, would not be good candidate for permcath and NGT.    Asked Frederick to consider mother in laws wishes,  given the multiple interventions/lines placed and continually pulling it out. Patient needing restraints at times.   Discussed advance directives, CPR, intubation, mechanical ventilation, its risks and benefits.  Limited benefit if to be resuscitate and intubated given her comorbid medical issues.   Discussed NGT - risks/benefits.    Frederick agreed to DNR/I.  She does not want to put her through such an intervention.  Wishes for her to have a natural passing  Also does NOT want to burden her with an NGT

## 2021-05-21 NOTE — CHART NOTE - NSCHARTNOTEFT_GEN_A_CORE
Nutrition Note:     Calorie Count for 5/20 reveals pt ate zero amount of food. Pt confused. Family meeting today. RD to Remain available.

## 2021-05-21 NOTE — PROGRESS NOTE ADULT - SUBJECTIVE AND OBJECTIVE BOX
[facility] DIVISION OF KIDNEY DISEASES AND HYPERTENSION -- FOLLOW UP NOTE  --------------------------------------------------------------------------------  Chief Complaint:  ESRD HD  24 hour events/subjective:  Seen/examined  Pt unable to tolerate HD yesterday; poor flows;       PAST HISTORY  --------------------------------------------------------------------------------  No significant changes to PMH, PSH, FHx, SHx, unless otherwise noted    ALLERGIES & MEDICATIONS  --------------------------------------------------------------------------------  Allergies    ertapenem (Urticaria)  Purell (Rash)    Intolerances      Standing Inpatient Medications  aspirin  chewable 81 milliGRAM(s) Oral daily  atorvastatin 40 milliGRAM(s) Oral at bedtime  chlorhexidine 4% Liquid 1 Application(s) Topical <User Schedule>  chlorhexidine 4% Liquid 1 Application(s) Topical <User Schedule>  chlorhexidine 4% Liquid 1 Application(s) Topical <User Schedule>  dextrose 40% Gel 15 Gram(s) Oral once  dextrose 5%. 1000 milliLiter(s) IV Continuous <Continuous>  dextrose 5%. 1000 milliLiter(s) IV Continuous <Continuous>  dextrose 50% Injectable 25 Gram(s) IV Push once  dextrose 50% Injectable 12.5 Gram(s) IV Push once  enoxaparin Injectable 90 milliGRAM(s) SubCutaneous daily  epoetin analilia-epbx (RETACRIT) Injectable 93435 Unit(s) SubCutaneous <User Schedule>  glucagon  Injectable 1 milliGRAM(s) IntraMuscular once  insulin lispro (ADMELOG) corrective regimen sliding scale   SubCutaneous Before meals and at bedtime  melatonin 5 milliGRAM(s) Oral at bedtime  metoprolol tartrate 12.5 milliGRAM(s) Oral every 6 hours  midodrine. 15 milliGRAM(s) Oral three times a day  Nephro-debbie 1 Tablet(s) Oral daily  pantoprazole  Injectable 40 milliGRAM(s) IV Push daily  polyethylene glycol 3350 17 Gram(s) Oral daily  QUEtiapine 50 milliGRAM(s) Oral every 8 hours  senna 2 Tablet(s) Oral at bedtime  sodium bicarbonate 1300 milliGRAM(s) Oral two times a day    PRN Inpatient Medications  acetaminophen    Suspension .. 650 milliGRAM(s) Oral every 6 hours PRN  haloperidol    Injectable 2.5 milliGRAM(s) IntraMuscular every 8 hours PRN  sodium chloride 0.9% lock flush 10 milliLiter(s) IV Push every 1 hour PRN  sodium chloride 0.9% lock flush 10 milliLiter(s) IV Push every 1 hour PRN  sodium chloride 0.9% lock flush 10 milliLiter(s) IV Push every 1 hour PRN      REVIEW OF SYSTEMS  --------------------------------------------------------------------------------  Unable to obtain    VITALS/PHYSICAL EXAM  --------------------------------------------------------------------------------  T(C): 36.5 (05-21-21 @ 10:10), Max: 37.1 (05-21-21 @ 04:13)  HR: 83 (05-21-21 @ 10:10) (83 - 116)  BP: 106/70 (05-21-21 @ 10:10) (96/62 - 133/74)  RR: 18 (05-21-21 @ 10:10) (18 - 19)  SpO2: 94% (05-21-21 @ 10:10) (94% - 100%)  Wt(kg): --        05-20-21 @ 07:01  -  05-21-21 @ 07:00  --------------------------------------------------------  IN: 600 mL / OUT: 200 mL / NET: 400 mL      Physical Exam:  PE ;  Restless, Pale,   lungs - Coarse BS,  CV gr 1 murmur,  No gallop or rub  Abd : soft, NT BS +, No masses  Ext- No edema  Neuro : Grossly intact, moving extremities .  Demented,    LABS/STUDIES  --------------------------------------------------------------------------------              9.6    9.08  >-----------<  223      [05-20-21 @ 06:57]              33.1     137  |  101  |  38.0  ----------------------------<  119      [05-21-21 @ 07:58]  4.9   |  20.0  |  7.62        Ca     8.8     [05-21-21 @ 07:58]      Mg     2.4     [05-21-21 @ 07:58]    TPro  7.1  /  Alb  2.8  /  TBili  0.4  /  DBili  x   /  AST  26  /  ALT  6   /  AlkPhos  77  [05-21-21 @ 07:58]          Creatinine Trend:  SCr 7.62 [05-21 @ 07:58]  SCr 6.76 [05-20 @ 06:57]  SCr 9.85 [05-19 @ 05:47]  SCr 8.77 [05-18 @ 06:57]  SCr 7.42 [05-17 @ 08:19]        HbA1c 9.4      [02-07-20 @ 21:14]  TSH 1.15      [04-14-21 @ 08:13]  Lipid: chol 106, , HDL 40, LDL --      [04-30-21 @ 04:55]    HBsAb <3.0      [05-11-21 @ 00:04]  HBsAg Nonreact      [05-19-21 @ 11:46]  HBcAb Nonreact      [05-11-21 @ 00:04]  HCV 0.12, Nonreact      [05-19-21 @ 11:46]

## 2021-05-22 LAB
GLUCOSE BLDC GLUCOMTR-MCNC: 120 MG/DL — HIGH (ref 70–99)
GLUCOSE BLDC GLUCOMTR-MCNC: 133 MG/DL — HIGH (ref 70–99)
GLUCOSE BLDC GLUCOMTR-MCNC: 137 MG/DL — HIGH (ref 70–99)
GLUCOSE BLDC GLUCOMTR-MCNC: 141 MG/DL — HIGH (ref 70–99)

## 2021-05-22 PROCEDURE — 99232 SBSQ HOSP IP/OBS MODERATE 35: CPT

## 2021-05-22 PROCEDURE — 99233 SBSQ HOSP IP/OBS HIGH 50: CPT

## 2021-05-22 RX ADMIN — QUETIAPINE FUMARATE 50 MILLIGRAM(S): 200 TABLET, FILM COATED ORAL at 05:38

## 2021-05-22 RX ADMIN — PANTOPRAZOLE SODIUM 40 MILLIGRAM(S): 20 TABLET, DELAYED RELEASE ORAL at 13:17

## 2021-05-22 RX ADMIN — ENOXAPARIN SODIUM 90 MILLIGRAM(S): 100 INJECTION SUBCUTANEOUS at 13:11

## 2021-05-22 RX ADMIN — Medication 5 MILLIGRAM(S): at 21:37

## 2021-05-22 RX ADMIN — Medication 1300 MILLIGRAM(S): at 05:38

## 2021-05-22 RX ADMIN — QUETIAPINE FUMARATE 50 MILLIGRAM(S): 200 TABLET, FILM COATED ORAL at 21:37

## 2021-05-22 RX ADMIN — MIDODRINE HYDROCHLORIDE 15 MILLIGRAM(S): 2.5 TABLET ORAL at 05:38

## 2021-05-22 RX ADMIN — CHLORHEXIDINE GLUCONATE 1 APPLICATION(S): 213 SOLUTION TOPICAL at 05:37

## 2021-05-22 RX ADMIN — ATORVASTATIN CALCIUM 40 MILLIGRAM(S): 80 TABLET, FILM COATED ORAL at 21:37

## 2021-05-22 RX ADMIN — SENNA PLUS 2 TABLET(S): 8.6 TABLET ORAL at 21:37

## 2021-05-22 RX ADMIN — Medication 12.5 MILLIGRAM(S): at 05:38

## 2021-05-22 NOTE — PROGRESS NOTE ADULT - ASSESSMENT
1) ESRD on HD  2) malfunctioning AVF  3) central venous stenosis  4) dementia/ agitation    Pt is not a candidate for further HD  Recommend palliative care / hospice services  Will sign off; please reconsult prn

## 2021-05-22 NOTE — PROGRESS NOTE ADULT - SUBJECTIVE AND OBJECTIVE BOX
Patient is a 71y old  Female who presents with a chief complaint of CVA with hemorrhagic transformation (22 May 2021 12:40)      INTERVAL HPI/OVERNIGHT EVENTS: Seen and examined. She is laying in bed, does not respond to commands, does not open her eyes.       MEDICATIONS  (STANDING):  aspirin  chewable 81 milliGRAM(s) Oral daily  atorvastatin 40 milliGRAM(s) Oral at bedtime  chlorhexidine 4% Liquid 1 Application(s) Topical <User Schedule>  chlorhexidine 4% Liquid 1 Application(s) Topical <User Schedule>  chlorhexidine 4% Liquid 1 Application(s) Topical <User Schedule>  dextrose 40% Gel 15 Gram(s) Oral once  dextrose 5%. 1000 milliLiter(s) (50 mL/Hr) IV Continuous <Continuous>  dextrose 5%. 1000 milliLiter(s) (100 mL/Hr) IV Continuous <Continuous>  dextrose 50% Injectable 25 Gram(s) IV Push once  dextrose 50% Injectable 12.5 Gram(s) IV Push once  enoxaparin Injectable 90 milliGRAM(s) SubCutaneous daily  epoetin analilia-epbx (RETACRIT) Injectable 68687 Unit(s) SubCutaneous <User Schedule>  glucagon  Injectable 1 milliGRAM(s) IntraMuscular once  insulin lispro (ADMELOG) corrective regimen sliding scale   SubCutaneous Before meals and at bedtime  melatonin 5 milliGRAM(s) Oral at bedtime  metoprolol tartrate 12.5 milliGRAM(s) Oral every 6 hours  midodrine. 15 milliGRAM(s) Oral three times a day  Nephro-debbie 1 Tablet(s) Oral daily  pantoprazole  Injectable 40 milliGRAM(s) IV Push daily  polyethylene glycol 3350 17 Gram(s) Oral daily  QUEtiapine 50 milliGRAM(s) Oral every 8 hours  senna 2 Tablet(s) Oral at bedtime  sodium bicarbonate 1300 milliGRAM(s) Oral two times a day    MEDICATIONS  (PRN):  acetaminophen    Suspension .. 650 milliGRAM(s) Oral every 6 hours PRN Temp greater or equal to 38C (100.4F), Mild Pain (1 - 3)  haloperidol    Injectable 2.5 milliGRAM(s) IntraMuscular every 8 hours PRN agitation  HYDROmorphone  Injectable 0.5 milliGRAM(s) IV Push every 6 hours PRN pain and or  dyspnea  sodium chloride 0.9% lock flush 10 milliLiter(s) IV Push every 1 hour PRN Pre/post blood products, medications, blood draw, and to maintain line patency  sodium chloride 0.9% lock flush 10 milliLiter(s) IV Push every 1 hour PRN Pre/post blood products, medications, blood draw, and to maintain line patency  sodium chloride 0.9% lock flush 10 milliLiter(s) IV Push every 1 hour PRN Pre/post blood products, medications, blood draw, and to maintain line patency      Allergies    ertapenem (Urticaria)  Purell (Rash)    Intolerances        REVIEW OF SYSTEMS: not able to obtain       Vital Signs Last 24 Hrs  T(C): 36.6 (22 May 2021 08:23), Max: 36.8 (21 May 2021 16:34)  T(F): 97.9 (22 May 2021 08:23), Max: 98.3 (21 May 2021 16:34)  HR: 72 (22 May 2021 08:23) (72 - 107)  BP: 92/55 (22 May 2021 08:23) (92/55 - 110/60)  BP(mean): --  RR: 18 (22 May 2021 08:23) (18 - 18)  SpO2: 97% (22 May 2021 05:00) (96% - 98%)    PHYSICAL EXAM:  GENERAL: laying in bed and jittering, does not respond to questions, does not talk, does not open her eyes   HEAD:  Atraumatic, Normocephalic  EYES: EOMI, PERRLA, conjunctiva and sclera clear  NECK: Supple, No JVD, Normal thyroid  NERVOUS SYSTEM: awake   CHEST/LUNG: Clear to percussion bilaterally; No rales, rhonchi, wheezing, or rubs  HEART: Regular rate and rhythm; No murmurs, rubs, or gallops  ABDOMEN: Soft, Nontender, Nondistended; Bowel sounds present  EXTREMITIES:  No clubbing, cyanosis, or edema  SKIN: No rashes or lesions    LABS:    05-21    137  |  101  |  38.0<H>  ----------------------------<  119<H>  4.9   |  20.0<L>  |  7.62<H>    Ca    8.8      21 May 2021 07:58  Mg     2.4     05-21    TPro  7.1  /  Alb  2.8<L>  /  TBili  0.4  /  DBili  x   /  AST  26  /  ALT  6   /  AlkPhos  77  05-21        CAPILLARY BLOOD GLUCOSE      POCT Blood Glucose.: 137 mg/dL (22 May 2021 08:25)  POCT Blood Glucose.: 143 mg/dL (21 May 2021 21:47)  POCT Blood Glucose.: 147 mg/dL (21 May 2021 18:26)      RADIOLOGY & ADDITIONAL TESTS:    Imaging Personally Reviewed:  [ ] YES  [ ] NO    Consultant(s) Notes Reviewed:  [ ] YES  [ ] NO    Care Discussed with Consultants/Other Providers [ ] YES  [ ] NO    Plan of Care discussed with Housestaff [ ]YES [ ] NO

## 2021-05-22 NOTE — PROGRESS NOTE ADULT - SUBJECTIVE AND OBJECTIVE BOX
[facility] DIVISION OF KIDNEY DISEASES AND HYPERTENSION -- FOLLOW UP NOTE    --------------------------------------------------------------------------------  Chief Complaint: ESRD/Ongoing hemodialysis requirement    24 hour events/subjective:  no acute event  unable to obtain ROS        PAST HISTORY  --------------------------------------------------------------------------------  No significant changes to PMH, PSH, FHx, SHx, unless otherwise noted    ALLERGIES & MEDICATIONS  --------------------------------------------------------------------------------  Allergies    ertapenem (Urticaria)  Purell (Rash)    Intolerances      Standing Inpatient Medications  aspirin  chewable 81 milliGRAM(s) Oral daily  atorvastatin 40 milliGRAM(s) Oral at bedtime  chlorhexidine 4% Liquid 1 Application(s) Topical <User Schedule>  chlorhexidine 4% Liquid 1 Application(s) Topical <User Schedule>  chlorhexidine 4% Liquid 1 Application(s) Topical <User Schedule>  dextrose 40% Gel 15 Gram(s) Oral once  dextrose 5%. 1000 milliLiter(s) IV Continuous <Continuous>  dextrose 5%. 1000 milliLiter(s) IV Continuous <Continuous>  dextrose 50% Injectable 25 Gram(s) IV Push once  dextrose 50% Injectable 12.5 Gram(s) IV Push once  enoxaparin Injectable 90 milliGRAM(s) SubCutaneous daily  epoetin analilia-epbx (RETACRIT) Injectable 58713 Unit(s) SubCutaneous <User Schedule>  glucagon  Injectable 1 milliGRAM(s) IntraMuscular once  insulin lispro (ADMELOG) corrective regimen sliding scale   SubCutaneous Before meals and at bedtime  melatonin 5 milliGRAM(s) Oral at bedtime  metoprolol tartrate 12.5 milliGRAM(s) Oral every 6 hours  midodrine. 15 milliGRAM(s) Oral three times a day  Nephro-debbie 1 Tablet(s) Oral daily  pantoprazole  Injectable 40 milliGRAM(s) IV Push daily  polyethylene glycol 3350 17 Gram(s) Oral daily  QUEtiapine 50 milliGRAM(s) Oral every 8 hours  senna 2 Tablet(s) Oral at bedtime  sodium bicarbonate 1300 milliGRAM(s) Oral two times a day    PRN Inpatient Medications  acetaminophen    Suspension .. 650 milliGRAM(s) Oral every 6 hours PRN  haloperidol    Injectable 2.5 milliGRAM(s) IntraMuscular every 8 hours PRN  HYDROmorphone  Injectable 0.5 milliGRAM(s) IV Push every 6 hours PRN  sodium chloride 0.9% lock flush 10 milliLiter(s) IV Push every 1 hour PRN  sodium chloride 0.9% lock flush 10 milliLiter(s) IV Push every 1 hour PRN  sodium chloride 0.9% lock flush 10 milliLiter(s) IV Push every 1 hour PRN      REVIEW OF SYSTEMS  --------------------------------------------------------------------------------  unable to obtain    VITALS/PHYSICAL EXAM  --------------------------------------------------------------------------------  T(C): 36.6 (05-22-21 @ 08:23), Max: 36.8 (05-21-21 @ 16:34)  HR: 72 (05-22-21 @ 08:23) (72 - 107)  BP: 92/55 (05-22-21 @ 08:23) (92/55 - 110/60)  RR: 18 (05-22-21 @ 08:23) (18 - 18)  SpO2: 97% (05-22-21 @ 05:00) (96% - 98%)  Wt(kg): --        Physical Exam:  	Gen: lethargic  	HEENT: on RA  	Pulm: CTA B/L ant  	CV: RRR, S1S2; no rub  	Abd: +BS, soft, nontender  	UE: Warm  	LE: Warm, no edema  	Neuro: unable to assess  	Skin: Warm  	Vascular access: LIJ non RTDC    LABS/STUDIES  --------------------------------------------------------------------------------    137  |  101  |  38.0  ----------------------------<  119      [05-21-21 @ 07:58]  4.9   |  20.0  |  7.62        Ca     8.8     [05-21-21 @ 07:58]      Mg     2.4     [05-21-21 @ 07:58]    TPro  7.1  /  Alb  2.8  /  TBili  0.4  /  DBili  x   /  AST  26  /  ALT  6   /  AlkPhos  77  [05-21-21 @ 07:58]          HbA1c 9.4      [02-07-20 @ 21:14]  TSH 1.15      [04-14-21 @ 08:13]  Lipid: chol 106, , HDL 40, LDL --      [04-30-21 @ 04:55]    HBsAb <3.0      [05-11-21 @ 00:04]  HBsAg Nonreact      [05-19-21 @ 11:46]  HBcAb Nonreact      [05-11-21 @ 00:04]  HCV 0.12, Nonreact      [05-19-21 @ 11:46]

## 2021-05-22 NOTE — PROGRESS NOTE ADULT - ASSESSMENT
1. Acute metabolic encephalopathy in setting of dementia - secondary to multiple cvas in the past month  asa and statin   c/w seroquel   - supportive care    2. CVA - in setting of afib  - lovenox      3. dm2 - elevated a1c 8.7  c.w ssi    4. afib - Rate controlled on metoprolol 12.5mg qid  no dig as per EP       5. esrd - renal following   Not a candidate for further HD     6. prognosis poor   dnr dni  - pallaitvie following  - will place hospice consult

## 2021-05-23 DIAGNOSIS — R53.2 FUNCTIONAL QUADRIPLEGIA: ICD-10-CM

## 2021-05-23 DIAGNOSIS — R06.00 DYSPNEA, UNSPECIFIED: ICD-10-CM

## 2021-05-23 DIAGNOSIS — N18.6 END STAGE RENAL DISEASE: ICD-10-CM

## 2021-05-23 DIAGNOSIS — Z71.89 OTHER SPECIFIED COUNSELING: ICD-10-CM

## 2021-05-23 LAB
GLUCOSE BLDC GLUCOMTR-MCNC: 127 MG/DL — HIGH (ref 70–99)
SARS-COV-2 RNA SPEC QL NAA+PROBE: SIGNIFICANT CHANGE UP

## 2021-05-23 PROCEDURE — 99232 SBSQ HOSP IP/OBS MODERATE 35: CPT

## 2021-05-23 PROCEDURE — 99233 SBSQ HOSP IP/OBS HIGH 50: CPT

## 2021-05-23 RX ORDER — HYDROMORPHONE HYDROCHLORIDE 2 MG/ML
0.5 INJECTION INTRAMUSCULAR; INTRAVENOUS; SUBCUTANEOUS
Refills: 0 | Status: DISCONTINUED | OUTPATIENT
Start: 2021-05-23 | End: 2021-05-24

## 2021-05-23 RX ORDER — ROBINUL 0.2 MG/ML
0.4 INJECTION INTRAMUSCULAR; INTRAVENOUS EVERY 4 HOURS
Refills: 0 | Status: DISCONTINUED | OUTPATIENT
Start: 2021-05-23 | End: 2021-05-24

## 2021-05-23 RX ADMIN — CHLORHEXIDINE GLUCONATE 1 APPLICATION(S): 213 SOLUTION TOPICAL at 06:23

## 2021-05-23 RX ADMIN — CHLORHEXIDINE GLUCONATE 1 APPLICATION(S): 213 SOLUTION TOPICAL at 06:22

## 2021-05-23 RX ADMIN — QUETIAPINE FUMARATE 50 MILLIGRAM(S): 200 TABLET, FILM COATED ORAL at 06:22

## 2021-05-23 RX ADMIN — MIDODRINE HYDROCHLORIDE 15 MILLIGRAM(S): 2.5 TABLET ORAL at 06:22

## 2021-05-23 RX ADMIN — Medication 1300 MILLIGRAM(S): at 06:22

## 2021-05-23 NOTE — PROGRESS NOTE ADULT - PROBLEM SELECTOR PLAN 4
.  Multiple episodes of pulling out temporary HD access  -no longer a candidate for HD  -patient is now eligible for hospice and would benefit from inpatient hospice given unreliable oral route

## 2021-05-23 NOTE — PROGRESS NOTE ADULT - SUBJECTIVE AND OBJECTIVE BOX
Patient is a 71y old  Female who presents with a chief complaint of CVA with hemorrhagic transformation (22 May 2021 13:08)      INTERVAL HPI/OVERNIGHT EVENTS: No new events overnight.   Per nurse, pt open her eyes and says words when family at bedside.     MEDICATIONS  (STANDING):  aspirin  chewable 81 milliGRAM(s) Oral daily  atorvastatin 40 milliGRAM(s) Oral at bedtime  chlorhexidine 4% Liquid 1 Application(s) Topical <User Schedule>  chlorhexidine 4% Liquid 1 Application(s) Topical <User Schedule>  chlorhexidine 4% Liquid 1 Application(s) Topical <User Schedule>  dextrose 40% Gel 15 Gram(s) Oral once  dextrose 5%. 1000 milliLiter(s) (50 mL/Hr) IV Continuous <Continuous>  dextrose 5%. 1000 milliLiter(s) (100 mL/Hr) IV Continuous <Continuous>  dextrose 50% Injectable 25 Gram(s) IV Push once  dextrose 50% Injectable 12.5 Gram(s) IV Push once  enoxaparin Injectable 90 milliGRAM(s) SubCutaneous daily  epoetin analilia-epbx (RETACRIT) Injectable 66235 Unit(s) SubCutaneous <User Schedule>  glucagon  Injectable 1 milliGRAM(s) IntraMuscular once  insulin lispro (ADMELOG) corrective regimen sliding scale   SubCutaneous Before meals and at bedtime  melatonin 5 milliGRAM(s) Oral at bedtime  metoprolol tartrate 12.5 milliGRAM(s) Oral every 6 hours  midodrine. 15 milliGRAM(s) Oral three times a day  Nephro-debbie 1 Tablet(s) Oral daily  pantoprazole  Injectable 40 milliGRAM(s) IV Push daily  polyethylene glycol 3350 17 Gram(s) Oral daily  QUEtiapine 50 milliGRAM(s) Oral every 8 hours  senna 2 Tablet(s) Oral at bedtime  sodium bicarbonate 1300 milliGRAM(s) Oral two times a day    MEDICATIONS  (PRN):  acetaminophen    Suspension .. 650 milliGRAM(s) Oral every 6 hours PRN Temp greater or equal to 38C (100.4F), Mild Pain (1 - 3)  haloperidol    Injectable 2.5 milliGRAM(s) IntraMuscular every 8 hours PRN agitation  HYDROmorphone  Injectable 0.5 milliGRAM(s) IV Push every 6 hours PRN pain and or  dyspnea  sodium chloride 0.9% lock flush 10 milliLiter(s) IV Push every 1 hour PRN Pre/post blood products, medications, blood draw, and to maintain line patency  sodium chloride 0.9% lock flush 10 milliLiter(s) IV Push every 1 hour PRN Pre/post blood products, medications, blood draw, and to maintain line patency  sodium chloride 0.9% lock flush 10 milliLiter(s) IV Push every 1 hour PRN Pre/post blood products, medications, blood draw, and to maintain line patency      Allergies    ertapenem (Urticaria)  Purell (Rash)    Intolerances        REVIEW OF SYSTEMS: not able to obtain       Vital Signs Last 24 Hrs  T(C): 36.8 (23 May 2021 11:16), Max: 36.8 (23 May 2021 11:16)  T(F): 98.2 (23 May 2021 11:16), Max: 98.2 (23 May 2021 11:16)  HR: 95 (23 May 2021 11:16) (65 - 100)  BP: 99/57 (23 May 2021 11:16) (97/55 - 110/67)  BP(mean): --  RR: 18 (23 May 2021 11:16) (18 - 18)  SpO2: 95% (23 May 2021 11:16) (95% - 99%)    PHYSICAL EXAM:  GENERAL: laying in bed with her eyes close, not responding to questions   HEAD:  Atraumatic, Normocephalic  EYES: conjunctiva and sclera clear  NECK: central line in place   NERVOUS SYSTEM:  awake, does not respond   CHEST/LUNG: Clear to percussion bilaterally; No rales, rhonchi, wheezing, or rubs  HEART: Regular rate and rhythm; No murmurs, rubs, or gallops  ABDOMEN: Soft, Nontender, Nondistended; Bowel sounds present  EXTREMITIES:  No clubbing, cyanosis, or edema  SKIN: No rashes or lesions    LABS:              CAPILLARY BLOOD GLUCOSE      POCT Blood Glucose.: 127 mg/dL (23 May 2021 08:55)  POCT Blood Glucose.: 141 mg/dL (22 May 2021 21:36)  POCT Blood Glucose.: 120 mg/dL (22 May 2021 17:40)  POCT Blood Glucose.: 133 mg/dL (22 May 2021 13:08)      RADIOLOGY & ADDITIONAL TESTS:    Imaging Personally Reviewed:  [ ] YES  [ ] NO    Consultant(s) Notes Reviewed:  [ ] YES  [ ] NO    Care Discussed with Consultants/Other Providers [ ] YES  [ ] NO    Plan of Care discussed with Housestaff [ ]YES [ ] NO

## 2021-05-23 NOTE — PROGRESS NOTE ADULT - ASSESSMENT
Full Note to Follow.    ·	ordered Dilaudid 0.5mg IV q3h PRN for Pain  ·	ordered Dilaudid 0.5mg IV q3h PRN for Dyspnea or RR>22  ·	ordered Ativan 1mg IV q4h PRN for Anxiety/Agitation  ·	ordered Robinul 0.4mg IV q4h PRN for Excessive Secretions    Recommend removing Shiley so restraints can be discontinued (restraints would be a barrier to inpatient hospice transfer)  Patient does not have a reliable oral route and would benefit from inpatient hospice given the expected symptom of stopping HD.   71 year old woman  PMHx CAD s/p stents '07, HTN, MI, PAF, ESRD, liver abscess, s/p biliary stent with removal (11/2018; 7/2/19), chronic pancreatitis, DM2  transferred from [facility] with  hx PCA CVA with hemorrhagic conversion. Hospital course complicated by agitation with multiple episodes of pulling out Shiley. Palliative consulted for complex medical decision making in the setting of life-limiting illness.    ·	ordered Dilaudid 0.5mg IV q3h PRN for Pain  ·	ordered Dilaudid 0.5mg IV q3h PRN for Dyspnea or RR>22  ·	ordered Ativan 1mg IV q4h PRN for Anxiety/Agitation  ·	ordered Robinul 0.4mg IV q4h PRN for Excessive Secretions    Recommend removing Shiley now so restraints can be discontinued (restraints would be a barrier to inpatient hospice transfer)  Patient does not have a reliable oral route and would benefit from inpatient hospice given the expected symptom of stopping HD.

## 2021-05-23 NOTE — PROGRESS NOTE ADULT - PROBLEM SELECTOR PLAN 2
.  -ordered Ativan 1mg IV q4h PRN for Anxiety/Agitation  -alternatively could use Haldol 0.5-1mg IV q6h PRN for Agitation  -remove shiley so restraints can be discontinued

## 2021-05-23 NOTE — PROGRESS NOTE ADULT - PROBLEM SELECTOR PLAN 5
.  Patient is DNR/DNI, MOLST in chart  -see GOC note from 5/21  -hospice referral sent by primary team; family is accepting of hospice transition, ongoing transition

## 2021-05-23 NOTE — PROGRESS NOTE ADULT - PROBLEM SELECTOR PROBLEM 4
Encounter for palliative care
Palliative care encounter
ESRD (end stage renal disease)

## 2021-05-23 NOTE — PROGRESS NOTE ADULT - PROBLEM SELECTOR PLAN 6
.  Complex medical decision making / symptom management in the setting of ESRD.    Will continue to follow for ongoing GOC discussion / titration of palliative regimen.   Emotional support provided, questions answered.    Ceasar Mora, Palliative Care Attending  Contact Info: Message on [employer] Teams (Ceasar Mora)

## 2021-05-23 NOTE — PROGRESS NOTE ADULT - ASSESSMENT
Spoke to family members, they would like to continue with hospice and comfort care   Patient to continue with comfort care with transition to in patient hospice     1. Acute metabolic encephalopathy in setting of dementia - secondary to multiple cvas in the past month  d/c asa and statin, seroquel   - supportive care    2. CVA - in setting of afib  d/c lovenox      3. dm2 - elevated a1c 8.7  c.w ssi    4. afib - Rate controlled on metoprolol 12.5mg qid  no dig as per EP       5. esrd - renal following   Not a candidate for further HD     6. prognosis poor   dnr dni  - pallaitvie following

## 2021-05-23 NOTE — PROGRESS NOTE ADULT - PROBLEM SELECTOR PROBLEM 2
ESRD (end stage renal disease) on dialysis
Agitation

## 2021-05-23 NOTE — PROGRESS NOTE ADULT - PROBLEM SELECTOR PLAN 3
Total assist in ADLS  Maintain safety, fall, aspiration precautions.
Total assist in ADLS  Maintain safety, fall, aspiration precautions.
.  PPSV = 20%, requires total assistance for all ADLs  -c/w supportive care
Patient on Seroquel
Total assist in ADLS  Maintain safety, fall, aspiration precautions.

## 2021-05-23 NOTE — PROGRESS NOTE ADULT - PROBLEM SELECTOR PLAN 1
Recent L PCA CVA now with hemorrhagic conversion in L PCA  Was in NeuroICU / Neurosx involved as well - started on ASA and hep sq by Neurosx - on neurochecks  Cardiology and Nephro following.
.  -ordered Dilaudid 0.5mg IV q3h PRN for Pain  -ordered Dilaudid 0.5mg IV q3h PRN for Dyspnea or RR>22  -ordered Robinul 0.4mg IV q4h PRN for Excessive Secretions
ASA, statin
Recent L PCA CVA now with hemorrhagic conversion in L PCA  Was in NeuroICU / Neurosx involved as well - started on ASA and hep sq by Neurosx - on neurochecks  Cardiology and Nephro following.
Recent L PCA CVA now with hemorrhagic conversion in L PCA  Was in NeuroICU / Neurosx involved as well - started on ASA and hep sq by Neurosx - on neurochecks  Cardiology and Nephro following.

## 2021-05-24 VITALS
DIASTOLIC BLOOD PRESSURE: 74 MMHG | OXYGEN SATURATION: 98 % | SYSTOLIC BLOOD PRESSURE: 119 MMHG | RESPIRATION RATE: 18 BRPM | HEART RATE: 117 BPM

## 2021-05-24 LAB — SARS-COV-2 RNA SPEC QL NAA+PROBE: SIGNIFICANT CHANGE UP

## 2021-05-24 PROCEDURE — 87340 HEPATITIS B SURFACE AG IA: CPT

## 2021-05-24 PROCEDURE — 94003 VENT MGMT INPAT SUBQ DAY: CPT

## 2021-05-24 PROCEDURE — 36600 WITHDRAWAL OF ARTERIAL BLOOD: CPT

## 2021-05-24 PROCEDURE — 71045 X-RAY EXAM CHEST 1 VIEW: CPT

## 2021-05-24 PROCEDURE — 83735 ASSAY OF MAGNESIUM: CPT

## 2021-05-24 PROCEDURE — 93306 TTE W/DOPPLER COMPLETE: CPT

## 2021-05-24 PROCEDURE — 82435 ASSAY OF BLOOD CHLORIDE: CPT

## 2021-05-24 PROCEDURE — 82962 GLUCOSE BLOOD TEST: CPT

## 2021-05-24 PROCEDURE — 92610 EVALUATE SWALLOWING FUNCTION: CPT

## 2021-05-24 PROCEDURE — 97163 PT EVAL HIGH COMPLEX 45 MIN: CPT

## 2021-05-24 PROCEDURE — 84132 ASSAY OF SERUM POTASSIUM: CPT

## 2021-05-24 PROCEDURE — 87040 BLOOD CULTURE FOR BACTERIA: CPT

## 2021-05-24 PROCEDURE — 83690 ASSAY OF LIPASE: CPT

## 2021-05-24 PROCEDURE — 80048 BASIC METABOLIC PNL TOTAL CA: CPT

## 2021-05-24 PROCEDURE — 80053 COMPREHEN METABOLIC PANEL: CPT

## 2021-05-24 PROCEDURE — 84295 ASSAY OF SERUM SODIUM: CPT

## 2021-05-24 PROCEDURE — 80069 RENAL FUNCTION PANEL: CPT

## 2021-05-24 PROCEDURE — 93990 DOPPLER FLOW TESTING: CPT

## 2021-05-24 PROCEDURE — 93005 ELECTROCARDIOGRAM TRACING: CPT

## 2021-05-24 PROCEDURE — 82803 BLOOD GASES ANY COMBINATION: CPT

## 2021-05-24 PROCEDURE — 86706 HEP B SURFACE ANTIBODY: CPT

## 2021-05-24 PROCEDURE — 80164 ASSAY DIPROPYLACETIC ACD TOT: CPT

## 2021-05-24 PROCEDURE — 86901 BLOOD TYPING SEROLOGIC RH(D): CPT

## 2021-05-24 PROCEDURE — 85027 COMPLETE CBC AUTOMATED: CPT

## 2021-05-24 PROCEDURE — U0005: CPT

## 2021-05-24 PROCEDURE — 36415 COLL VENOUS BLD VENIPUNCTURE: CPT

## 2021-05-24 PROCEDURE — U0003: CPT

## 2021-05-24 PROCEDURE — 70450 CT HEAD/BRAIN W/O DYE: CPT

## 2021-05-24 PROCEDURE — 84484 ASSAY OF TROPONIN QUANT: CPT

## 2021-05-24 PROCEDURE — 86803 HEPATITIS C AB TEST: CPT

## 2021-05-24 PROCEDURE — 85018 HEMOGLOBIN: CPT

## 2021-05-24 PROCEDURE — 82947 ASSAY GLUCOSE BLOOD QUANT: CPT

## 2021-05-24 PROCEDURE — 95711 VEEG 2-12 HR UNMONITORED: CPT

## 2021-05-24 PROCEDURE — 99239 HOSP IP/OBS DSCHRG MGMT >30: CPT

## 2021-05-24 PROCEDURE — 92526 ORAL FUNCTION THERAPY: CPT

## 2021-05-24 PROCEDURE — 82330 ASSAY OF CALCIUM: CPT

## 2021-05-24 PROCEDURE — 86900 BLOOD TYPING SEROLOGIC ABO: CPT

## 2021-05-24 PROCEDURE — 86769 SARS-COV-2 COVID-19 ANTIBODY: CPT

## 2021-05-24 PROCEDURE — 99233 SBSQ HOSP IP/OBS HIGH 50: CPT

## 2021-05-24 PROCEDURE — 84145 PROCALCITONIN (PCT): CPT

## 2021-05-24 PROCEDURE — 95700 EEG CONT REC W/VID EEG TECH: CPT

## 2021-05-24 PROCEDURE — 83605 ASSAY OF LACTIC ACID: CPT

## 2021-05-24 PROCEDURE — 80061 LIPID PANEL: CPT

## 2021-05-24 PROCEDURE — 85025 COMPLETE CBC W/AUTO DIFF WBC: CPT

## 2021-05-24 PROCEDURE — 74018 RADEX ABDOMEN 1 VIEW: CPT

## 2021-05-24 PROCEDURE — 95714 VEEG EA 12-26 HR UNMNTR: CPT

## 2021-05-24 PROCEDURE — 86850 RBC ANTIBODY SCREEN: CPT

## 2021-05-24 PROCEDURE — 85014 HEMATOCRIT: CPT

## 2021-05-24 PROCEDURE — 86704 HEP B CORE ANTIBODY TOTAL: CPT

## 2021-05-24 PROCEDURE — 85610 PROTHROMBIN TIME: CPT

## 2021-05-24 PROCEDURE — 99261: CPT

## 2021-05-24 PROCEDURE — 80074 ACUTE HEPATITIS PANEL: CPT

## 2021-05-24 PROCEDURE — 82040 ASSAY OF SERUM ALBUMIN: CPT

## 2021-05-24 PROCEDURE — 84100 ASSAY OF PHOSPHORUS: CPT

## 2021-05-24 PROCEDURE — 75572 CT HRT W/3D IMAGE: CPT

## 2021-05-24 PROCEDURE — 85730 THROMBOPLASTIN TIME PARTIAL: CPT

## 2021-05-24 RX ORDER — INSULIN ASPART 100 [IU]/ML
5 INJECTION, SOLUTION SUBCUTANEOUS
Qty: 0 | Refills: 0 | DISCHARGE

## 2021-05-24 RX ORDER — GABAPENTIN 400 MG/1
2 CAPSULE ORAL
Qty: 0 | Refills: 0 | DISCHARGE

## 2021-05-24 RX ORDER — METOPROLOL TARTRATE 50 MG
0.5 TABLET ORAL
Qty: 0 | Refills: 0 | DISCHARGE

## 2021-05-24 RX ORDER — ISOSORBIDE MONONITRATE 60 MG/1
1 TABLET, EXTENDED RELEASE ORAL
Qty: 0 | Refills: 0 | DISCHARGE

## 2021-05-24 RX ORDER — ASPIRIN/CALCIUM CARB/MAGNESIUM 324 MG
1 TABLET ORAL
Qty: 0 | Refills: 0 | DISCHARGE

## 2021-05-24 RX ORDER — SUCROFERRIC OXYHYDROXIDE 500 MG/1
1 TABLET, CHEWABLE ORAL
Qty: 0 | Refills: 0 | DISCHARGE

## 2021-05-24 NOTE — PROGRESS NOTE ADULT - SUBJECTIVE AND OBJECTIVE BOX
Patient is a 71y old  Female who presents with a chief complaint of CVA with hemorrhagic transformation (23 May 2021 12:40)      INTERVAL HPI/OVERNIGHT EVENTS: No events.  Sleeping   On comfort care and awaiting inpatient hospice     MEDICATIONS  (STANDING):    MEDICATIONS  (PRN):  glycopyrrolate Injectable 0.4 milliGRAM(s) IV Push every 4 hours PRN Excessive Secretions  HYDROmorphone  Injectable 0.5 milliGRAM(s) IV Push every 3 hours PRN Mild/Moderate/Severe Pain  HYDROmorphone  Injectable 0.5 milliGRAM(s) IV Push every 3 hours PRN Dyspnea and/or RR >20  LORazepam   Injectable 1 milliGRAM(s) IV Push every 4 hours PRN Anxiety/Agitation      Allergies    ertapenem (Urticaria)  Purell (Rash)    Intolerances        REVIEW OF SYSTEMS: not able to obtain       Vital Signs Last 24 Hrs  T(C): 36.6 (24 May 2021 09:18), Max: 36.7 (24 May 2021 05:03)  T(F): 97.9 (24 May 2021 09:18), Max: 98 (24 May 2021 05:03)  HR: 60 (24 May 2021 09:18) (60 - 109)  BP: 113/63 (24 May 2021 09:18) (91/50 - 113/63)  BP(mean): --  RR: 18 (24 May 2021 09:18) (18 - 18)  SpO2: 95% (24 May 2021 09:18) (95% - 96%)    PHYSICAL EXAM: not done     LABS:              CAPILLARY BLOOD GLUCOSE          RADIOLOGY & ADDITIONAL TESTS:    Imaging Personally Reviewed:  [ ] YES  [ ] NO    Consultant(s) Notes Reviewed:  [ ] YES  [ ] NO    Care Discussed with Consultants/Other Providers [ ] YES  [ ] NO    Plan of Care discussed with Housestaff [ ]YES [ ] NO

## 2021-05-24 NOTE — CHART NOTE - NSCHARTNOTESELECT_GEN_ALL_CORE
Event Note
Event Note
Nutrition Services
Vascular Surgery note/Event Note
Event Note
NGT placed 5/9/21
Nutrition Services
Off Service Note
Pre-op note
Transfer Note

## 2021-05-24 NOTE — PROGRESS NOTE ADULT - ASSESSMENT
Spoke to family members, they would like to continue with hospice and comfort care   Patient to continue with comfort care with transition to in patient hospice     1. Acute metabolic encephalopathy in setting of dementia - secondary to multiple cvas in the past month  All medications discontinued   - supportive care    2. CVA - in setting of afib  d/c lovenox      3. dm2 - elevated a1c 8.7      4. afib - Rate controlled on metoprolol 12.5mg qid  no dig as per EP       5. esrd - renal following   Not a candidate for further HD     6. prognosis poor   dnr dni  - pallaitvie following    Awaiting transition to inpatient hospice

## 2021-05-24 NOTE — PROGRESS NOTE ADULT - SUBJECTIVE AND OBJECTIVE BOX
CC:  follow up GOC   INTERVAL HPI/OVERNIGHT EVENTS:  Shiley removed  Family has been discussed hospice  nurse reports agitated intermittently especially with any interventions - personal care    PRESENT SYMPTOMS: SOURCE:  Patient/Family/Team    PAIN SCALE:  0 = none  1 = mild   2 = moderate  3 = severe    Pain: appears comfortable    Dyspnea:  [ ] YES [ x] NO  Anxiety:  [ ] YES [ ] NO  Fatigue: [ ] YES [ ] NO  Nausea: [ ] YES [ ] NO  na  Loss of Appetite: [x ] YES [ ] NO    Other symptoms: __________    MEDICATIONS  (STANDING):    MEDICATIONS  (PRN):  glycopyrrolate Injectable 0.4 milliGRAM(s) IV Push every 4 hours PRN Excessive Secretions  HYDROmorphone  Injectable 0.5 milliGRAM(s) IV Push every 3 hours PRN Mild/Moderate/Severe Pain  HYDROmorphone  Injectable 0.5 milliGRAM(s) IV Push every 3 hours PRN Dyspnea and/or RR >20  LORazepam   Injectable 1 milliGRAM(s) IV Push every 4 hours PRN Anxiety/Agitation      Allergies    ertapenem (Urticaria)  Purell (Rash)    Intolerances    Karnofsky Performance Score/Palliative Performance Status Version 2: 10  %    Vital Signs Last 24 Hrs  T(C): 36.6 (24 May 2021 09:18), Max: 36.7 (24 May 2021 05:03)  T(F): 97.9 (24 May 2021 09:18), Max: 98 (24 May 2021 05:03)  HR: 60 (24 May 2021 09:18) (60 - 109)  BP: 113/63 (24 May 2021 09:18) (91/50 - 113/63)  BP(mean): --  RR: 18 (24 May 2021 09:18) (18 - 18)  SpO2: 95% (24 May 2021 09:18) (95% - 96%)    PHYSICAL EXAM:    General: female, NAD lethargic     HEENT: [ x] normal  [ ] dry mouth  [ ] ET tube/trach    Lungs: [x ] comfortable [ ] tachypnea/labored breathing  [ ] excessive secretions    CV: [x ] normal  [ ] tachycardia    GI: [ x] normal  [ ] distended  [ ] tender  [ ] no BS               [ ] PEG/NG/OG tube    : [ ] normal  [ ] incontinent  [x ] oliguria/anuria  [ ] aguilar    MSK: [ ] normal  [ x] weakness  [ ] edema             [ ] ambulatory  [x ] bedbound/wheelchair bound    Skin: [ x] normal  [ ] pressure ulcers- Stage_____  [ ] no rash    LABS:    I&O's Summary      RADIOLOGY & ADDITIONAL STUDIES:

## 2021-05-24 NOTE — PROGRESS NOTE ADULT - PROVIDER SPECIALTY LIST ADULT
Internal Medicine
Nephrology
Rehab Medicine
Vascular Surgery
Vascular Surgery
Cardiology
Internal Medicine
Internal Medicine
NSICU
NSICU
Nephrology
Neurology
Rehab Medicine
Vascular Surgery
Cardiology
Electrophysiology
Internal Medicine
Nephrology
Neurology
Palliative Care
Rehab Medicine
Vascular Surgery
Vascular Surgery
Cardiology
Electrophysiology
Hospitalist
Internal Medicine
NSICU
Nephrology
Palliative Care
Vascular Surgery
Internal Medicine
NSICU
NSICU
Nephrology
Nephrology
Neurology
Neurology
Neurosurgery
Palliative Care
Palliative Care
Vascular Surgery
Hospitalist
Hospitalist
Internal Medicine
NSICU
NSICU
Nephrology
Palliative Care

## 2021-05-24 NOTE — HOSPICE CARE NOTE - CONVESATION DETAILS
Inpatient hospice referral received. However, patient has been on restraints - as per chart it looks like restraints now removed as of approximately one hour ago. Will evaluate later today to ensure that patient remains off of restraints and then will reach out to the [facility]/family re: possible transfer (likely on Tuesday, 5/25). Will continue to follow. 
Inpatient hospice referral received. [facility] RN telephoned patient's son, Mr. Black. All aspects of inpatient hospice services provided at the [facility] in [place] explained with good return understanding. All questions answered; emotional support provided. Patient's son agreed to have patient transferred to the [facility].    A bed is available at the [facility] this evening - writer requested 5pm ambulance . Dr. Bailey and Dr. Moreno aware.

## 2021-05-24 NOTE — PROGRESS NOTE ADULT - REASON FOR ADMISSION
CVA with hemorrhagic transformation

## 2021-05-24 NOTE — CHART NOTE - NSCHARTNOTEFT_GEN_A_CORE
Vascular Surgery follow UP    Brief PA note    Request for Left IJ shiley catheter removal.  Primary and Renal team plan to discontinue HD.    _ left IJ catheter removed, fully intact.  Pressure applied to the left neck site for 10 minutes  - Dressing applied, no signs of active hemorrhage  - patient tolerated procedure  - please change dressing as needed

## 2021-05-24 NOTE — PROGRESS NOTE ADULT - NUTRITIONAL ASSESSMENT
This patient has been assessed with a concern for Malnutrition and has been determined to have a diagnosis/diagnoses of Moderate protein-calorie malnutrition.    This patient is being managed with:   Diet Dysphagia 1 Pureed-Nectar Consistency Fluid-  Entered: May 14 2021 10:14AM    
This patient has been assessed with a concern for Malnutrition and has been determined to have a diagnosis/diagnoses of Moderate protein-calorie malnutrition.    This patient is being managed with:   Diet Dysphagia 1 Pureed-Honey Consistency Fluid-  For patients receiving Renal Replacement - No Protein Restr No Conc K No Conc Phos Low  Sodium (RENAL)  Entered: May 11 2021  1:12PM    
This patient has been assessed with a concern for Malnutrition and has been determined to have a diagnosis/diagnoses of Moderate protein-calorie malnutrition.    This patient is being managed with:   Diet NPO after Midnight-     NPO Start Date: 17-May-2021   NPO Start Time: 23:59  Entered: May 17 2021  8:01AM    Diet Dysphagia 1 Pureed-Nectar Consistency Fluid-  Entered: May 14 2021 10:14AM    
This patient has been assessed with a concern for Malnutrition and has been determined to have a diagnosis/diagnoses of Moderate protein-calorie malnutrition.    This patient is being managed with:   Diet NPO with Tube Feed-  Tube Feeding Modality: Nasogastric  Nepro (NEPRORTH)  Total Volume for 24 Hours (mL): 480  Continuous  Starting Tube Feed Rate {mL per Hour}: 10  Increase Tube Feed Rate by (mL): 10     Every 8 hours  Until Goal Tube Feed Rate (mL per Hour): 20  Tube Feed Duration (in Hours): 24  Tube Feed Start Time: 12:00  Entered: May  9 2021 10:15AM    
This patient has been assessed with a concern for Malnutrition and has been determined to have a diagnosis/diagnoses of Moderate protein-calorie malnutrition.    This patient is being managed with:   Diet Dysphagia 1 Pureed-Honey Consistency Fluid-  For patients receiving Renal Replacement - No Protein Restr No Conc K No Conc Phos Low  Sodium (RENAL)  Entered: May 11 2021  1:12PM    
This patient has been assessed with a concern for Malnutrition and has been determined to have a diagnosis/diagnoses of Moderate protein-calorie malnutrition.    This patient is being managed with:   Diet Dysphagia 1 Pureed-Nectar Consistency Fluid-  Entered: May 14 2021 10:14AM    
This patient has been assessed with a concern for Malnutrition and has been determined to have a diagnosis/diagnoses of Moderate protein-calorie malnutrition.    This patient is being managed with:   Diet Dysphagia 1 Pureed-Nectar Consistency Fluid-  Entered: May 14 2021 10:14AM    
This patient has been assessed with a concern for Malnutrition and has been determined to have a diagnosis/diagnoses of Moderate protein-calorie malnutrition.    This patient is being managed with:   Diet NPO after Midnight-     NPO Start Date: 17-May-2021   NPO Start Time: 23:59  Entered: May 17 2021  8:01AM    Diet Dysphagia 1 Pureed-Nectar Consistency Fluid-  Entered: May 14 2021 10:14AM    
This patient has been assessed with a concern for Malnutrition and has been determined to have a diagnosis/diagnoses of Moderate protein-calorie malnutrition.    This patient is being managed with:   Diet Dysphagia 1 Pureed-Nectar Consistency Fluid-  Entered: May 14 2021 10:14AM    
This patient has been assessed with a concern for Malnutrition and has been determined to have a diagnosis/diagnoses of Moderate protein-calorie malnutrition.    This patient is being managed with:   Diet NPO with Tube Feed-  Tube Feeding Modality: Nasogastric  Nepro (NEPRORTH)  Total Volume for 24 Hours (mL): 480  Continuous  Starting Tube Feed Rate {mL per Hour}: 10  Increase Tube Feed Rate by (mL): 10     Every 8 hours  Until Goal Tube Feed Rate (mL per Hour): 20  Tube Feed Duration (in Hours): 24  Tube Feed Start Time: 12:00  Entered: May  9 2021 10:15AM    
This patient has been assessed with a concern for Malnutrition and has been determined to have a diagnosis/diagnoses of Moderate protein-calorie malnutrition.    This patient is being managed with:   Diet Dysphagia 1 Pureed-Honey Consistency Fluid-  For patients receiving Renal Replacement - No Protein Restr No Conc K No Conc Phos Low  Sodium (RENAL)  Entered: May 11 2021  1:12PM    
This patient has been assessed with a concern for Malnutrition and has been determined to have a diagnosis/diagnoses of Moderate protein-calorie malnutrition.    This patient is being managed with:   Diet Dysphagia 1 Pureed-Nectar Consistency Fluid-  Entered: May 14 2021 10:14AM      This patient has been assessed with a concern for Malnutrition and has been determined to have a diagnosis/diagnoses of Moderate protein-calorie malnutrition.    This patient is being managed with:   Diet Dysphagia 1 Pureed-Nectar Consistency Fluid-  Entered: May 14 2021 10:14AM    
This patient has been assessed with a concern for Malnutrition and has been determined to have a diagnosis/diagnoses of Moderate protein-calorie malnutrition.    This patient is being managed with:   Diet NPO with Tube Feed-  Tube Feeding Modality: Nasogastric  Nepro (NEPRORTH)  Total Volume for 24 Hours (mL): 1100  Continuous  Starting Tube Feed Rate {mL per Hour}: 20  Increase Tube Feed Rate by (mL): 10     Every 4 hours  Until Goal Tube Feed Rate (mL per Hour): 55  Tube Feed Duration (in Hours): 20  Tube Feed Start Time: 10:00  Tube Feed Stop Time: 06:00  Entered: May  7 2021  9:35AM    Diet NPO with Tube Feed-  Tube Feeding Modality: Nasogastric  Nepro (NEPRORTH)  Total Volume for 24 Hours (mL): 480  Continuous  Starting Tube Feed Rate {mL per Hour}: 10  Until Goal Tube Feed Rate (mL per Hour): 20  Tube Feed Duration (in Hours): 24  Tube Feed Start Time: 20:00  Entered: May  6 2021  5:12PM    The following pending diet order is being considered for treatment of Moderate protein-calorie malnutrition:null
This patient has been assessed with a concern for Malnutrition and has been determined to have a diagnosis/diagnoses of Moderate protein-calorie malnutrition.    This patient is being managed with:   Diet NPO with Tube Feed-  Tube Feeding Modality: Nasogastric  Nepro (NEPRORTH)  Total Volume for 24 Hours (mL): 1100  Continuous  Starting Tube Feed Rate {mL per Hour}: 20  Increase Tube Feed Rate by (mL): 10     Every 4 hours  Until Goal Tube Feed Rate (mL per Hour): 55  Tube Feed Duration (in Hours): 20  Tube Feed Start Time: 10:00  Tube Feed Stop Time: 06:00  Entered: May  7 2021  9:35AM    Diet NPO with Tube Feed-  Tube Feeding Modality: Nasogastric  Nepro (NEPRORTH)  Total Volume for 24 Hours (mL): 480  Continuous  Starting Tube Feed Rate {mL per Hour}: 10  Until Goal Tube Feed Rate (mL per Hour): 20  Tube Feed Duration (in Hours): 24  Tube Feed Start Time: 20:00  Entered: May  6 2021  5:12PM    The following pending diet order is being considered for treatment of Moderate protein-calorie malnutrition:null
This patient has been assessed with a concern for Malnutrition and has been determined to have a diagnosis/diagnoses of Moderate protein-calorie malnutrition.    This patient is being managed with:   Diet Dysphagia 1 Pureed-Honey Consistency Fluid-  For patients receiving Renal Replacement - No Protein Restr No Conc K No Conc Phos Low  Sodium (RENAL)  Entered: May 11 2021  1:12PM    
This patient has been assessed with a concern for Malnutrition and has been determined to have a diagnosis/diagnoses of Moderate protein-calorie malnutrition.    This patient is being managed with:   Diet Dysphagia 1 Pureed-Nectar Consistency Fluid-  Entered: May 14 2021 10:14AM    
This patient has been assessed with a concern for Malnutrition and has been determined to have a diagnosis/diagnoses of Moderate protein-calorie malnutrition.    This patient is being managed with:   Diet Dysphagia 1 Pureed-Nectar Consistency Fluid-  Entered: May 14 2021 10:14AM    
This patient has been assessed with a concern for Malnutrition and has been determined to have a diagnosis/diagnoses of Moderate protein-calorie malnutrition.    This patient is being managed with:   Diet NPO after Midnight-     NPO Start Date: 17-May-2021   NPO Start Time: 23:59  Entered: May 17 2021  8:01AM    Diet Dysphagia 1 Pureed-Nectar Consistency Fluid-  Entered: May 14 2021 10:14AM    
This patient has been assessed with a concern for Malnutrition and has been determined to have a diagnosis/diagnoses of Moderate protein-calorie malnutrition.    This patient is being managed with:   Diet Dysphagia 1 Pureed-Nectar Consistency Fluid-  Entered: May 14 2021 10:14AM

## 2021-05-24 NOTE — PROGRESS NOTE ADULT - NSICDXPILOT_GEN_ALL_CORE
[place]

## 2021-05-24 NOTE — PROGRESS NOTE ADULT - ASSESSMENT
71 year old woman  PMHx CAD s/p stents '07, HTN, MI, PAF, ESRD, liver abscess, s/p biliary stent with removal (11/2018; 7/2/19), chronic pancreatitis, DM2  transferred from [facility] with  hx PCA CVA with hemorrhagic conversion. Hospital course complicated by agitation with multiple episodes of pulling out Shiley     Problem/Plan - 1:  ·  Problem: CVA (cerebral vascular accident).  Plan: ASA, statin.      Problem/Plan - 2:  ·  Problem: ESRD (end stage renal disease) on dialysis.  Plan: Unable to do HD today 2/2 to poor flow from Shiley.   Patient has had multiple episodes of pulling out IV lines and Shiley.  Discussed with Vascular resident Mitesh who spoke to Dr. Martinez - patient not medically stable enough for fistulogram evaluation.  Patient agitated and has central stenosis thus making placement of Shiley and permcath difficult. Per Nephrology - patient NOT a candidate for HD     Problem/Plan - 3:  ·  Problem: Agitation.    Plan: Patient on Seroquel - has not taken, poor mental status  Ativan 1mg IV PRN      Problem/Plan - 4:  ·  Problem: Encounter for palliative care.    Per review of  previous Palliative notes - Son and his wife Frederick are surrogates  patient on comfort care  Shiley removed - off restraints  Monitor for symptoms especially since patient has not received HD. Dilaudid PRN for dyspnea/pain as ordered  Hospice consulted for consideration for inpatient transfer.    71 year old woman  PMHx CAD s/p stents '07, HTN, MI, PAF, ESRD, liver abscess, s/p biliary stent with removal (11/2018; 7/2/19), chronic pancreatitis, DM2  transferred from [facility] with  hx PCA CVA with hemorrhagic conversion. Hospital course complicated by agitation with multiple episodes of pulling out Shiley     Problem/Plan - 1:  ·  Problem: CVA (cerebral vascular accident).  Plan: ASA, statin.      Problem/Plan - 2:  ·  Problem: ESRD (end stage renal disease) on dialysis.  Plan: Unable to do HD today 2/2 to poor flow from Shiley.   Patient has had multiple episodes of pulling out IV lines and Shiley.  Discussed with Vascular resident Mitesh who spoke to Dr. Martinez - patient not medically stable enough for fistulogram evaluation.  Patient agitated and has central stenosis thus making placement of Shiley and permcath difficult. Per Nephrology - patient NOT a candidate for HD     Problem/Plan - 3:  ·  Problem: Agitation.    Plan: Patient on Seroquel - has not taken, poor mental status  Ativan 1mg IV PRN      Problem/Plan - 4:  ·  Problem: Encounter for palliative care.    Per review of  previous Palliative notes - Son and his wife Frederick are surrogates  patient on comfort care  Shiley removed - off restraints  Monitor for symptoms especially since patient has not received HD. Dilaudid PRN for dyspnea/pain as ordered  Hospice consulted for consideration for inpatient transfer.     Family at bedside -   spoke to son Víctor.  He states wife updated him on our conversation on 5/21. He knows that HD is not possible and informed him that his is end of life. He affirms comfort care  Agreeable to hospice services.

## 2021-07-20 NOTE — H&P ADULT - DOES THIS PATIENT HAVE A HISTORY OF OR HAS BEEN DX WITH HEART FAILURE?
MEDICARE WELLNESS VISIT + NOTE    CHIEF COMPLAINT:  Lela Benson presents for her Subsequent Annual Medicare Wellness Visit.   Her additional complaints or concerns are addressed below.          Patient Care Team:  Gerry Caro DO as PCP - General (Family Practice)  ENT: [facility]       Patient Active Problem List   Diagnosis   • Dysthymic disorder   • Essential hypertension, benign   • Hyperlipidemia   • Hypothyroidism   • Sensorineural hearing loss, bilateral         No past medical history on file.      No past surgical history on file.      Social History     Tobacco Use   • Smoking status: Former Smoker   • Smokeless tobacco: Never Used   Substance Use Topics   • Alcohol use: Not Currently   • Drug use: Not on file     Family History - Reviewed      Current Outpatient Medications   Medication Sig Dispense Refill   • citalopram (CeleXA) 20 MG tablet Take 1 tablet by mouth daily. 90 tablet 3   • levothyroxine 75 MCG tablet Take 1 tablet by mouth daily. 90 tablet 3   • meloxicam (MOBIC) 7.5 MG tablet Take 1 tablet by mouth daily. 90 tablet 3   • aspirin 81 MG tablet Take 81 mg by mouth daily.     • hydrochlorothiazide (HYDRODIURIL) 25 MG tablet Take 1 tablet by mouth daily. 90 tablet 3     No current facility-administered medications for this visit.        The following items on the Medicare Health Risk Assessment were found to be positive            Vision and Hearing screens: Not performed    Advance Directive:   The patient has the following documents:  No Advance Directives on file. Patient offered documents.    Cognitive/Functional Status: no evidence of cognitive dysfunction by direct observation    Opioid Review: Lela is not taking opioid medications.    Recent PHQ 2/9 Score:    PHQ 2:       PHQ 9:       DEPRESSION ASSESSMENT/PLAN:  Depression screening is negative no further plan needed.     Body mass index is 35.02 kg/m².    BMI ASSESSMENT/PLAN:  Patient is overweight.    30 minutes of  physical activity a day        See Patient Instructions section.   No follow-ups on file.      OUTPATIENT PROGRESS NOTE    Subjective   Chief Complaint Establish Care, Medicare Wellness Visit, and Other (has been off BP meds since early June, felt much better after stopping (less chest tightness, easier to breathe/garden/walk), occ. SOB - wants to try lower dose  )    HPI   Patient presented to establish care.    Patient ran out of her blood pressure medication early June and called to have it refilled medications refilled at her office however patient was never called by the pharmacy to note that she had the medication refilled.  Patient blood pressure is elevated    Patient felt slightly better after stopping medication however I have 1 episode of chest tightness and head rushing to her head.    Patient does notice she has more lower extremity swelling since stopping medication.    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        Medications  Medications were reviewed and updated today.    Histories  I have personally reviewed and updated the patient's past medical, past surgical, family and social histories during today's visit.      Assessment & Plan   Diagnoses and associated orders for this visit:  1. Medicare annual wellness visit, subsequent  -     Lipid Panel With Reflex  -     CBC with Automated Differential  -     COMPREHENSIVE MET PNL (RANDOM)  -     Thyroid Stimulating Hormone  2. Postmenopausal status (age-related) (natural)  -     BD DEXA SCAN AXIAL SKELETON  -     Lipid Panel With Reflex  -     CBC with Automated Differential  -     COMPREHENSIVE MET PNL (RANDOM)  -     Thyroid Stimulating Hormone  3. Dysthymic disorder  -     Citalopram Hydrobromide  -     Lipid Panel With Reflex  -     CBC with Automated Differential  -     COMPREHENSIVE MET  PNL (RANDOM)  -     Thyroid Stimulating Hormone  4. Essential hypertension, benign  -     hydroCHLOROthiazide  -     Lipid Panel With Reflex  -     CBC with Automated Differential  -     COMPREHENSIVE MET PNL (RANDOM)  -     Thyroid Stimulating Hormone  5. Mixed hyperlipidemia  -     Lipid Panel With Reflex  -     CBC with Automated Differential  -     COMPREHENSIVE MET PNL (RANDOM)  -     Thyroid Stimulating Hormone  6. Hypothyroidism, unspecified type  -     Levothyroxine Sodium  -     Lipid Panel With Reflex  -     CBC with Automated Differential  -     COMPREHENSIVE MET PNL (RANDOM)  -     Thyroid Stimulating Hormone  7. Sensorineural hearing loss, bilateral  -     SERVICE TO ENT  8. Bilateral hearing loss due to cerumen impaction  -     SERVICE TO ENT          Review of Systems:  Review of Systems   All other systems reviewed and are negative.      Physical:  Visit Vitals  BP (!) 162/70   Pulse 90   Temp 96.9 °F (36.1 °C) (Temporal)   Ht 5' 4\" (1.626 m)   Wt 92.5 kg (204 lb)   SpO2 96%   BMI 35.02 kg/m²     Physical Exam  Vitals reviewed.   Constitutional:       Appearance: She is well-developed.   HENT:      Head: Normocephalic and atraumatic.      Right Ear: External ear normal.      Left Ear: External ear normal.      Nose: Nose normal.   Eyes:      Conjunctiva/sclera: Conjunctivae normal.      Pupils: Pupils are equal, round, and reactive to light.   Neck:      Thyroid: No thyromegaly.   Cardiovascular:      Rate and Rhythm: Normal rate and regular rhythm.      Heart sounds: Normal heart sounds.   Pulmonary:      Effort: Pulmonary effort is normal. No respiratory distress.      Breath sounds: Normal breath sounds. No wheezing or rales.   Abdominal:      General: Bowel sounds are normal. There is no distension.      Palpations: Abdomen is soft.      Tenderness: There is no abdominal tenderness. There is no rebound.   Musculoskeletal:      Cervical back: Normal range of motion and neck supple.   Skin:      General: Skin is warm and dry.   Neurological:      Mental Status: She is alert and oriented to person, place, and time.   Psychiatric:         Behavior: Behavior normal.         Thought Content: Thought content normal.         Judgment: Judgment normal.     Cerumen impaction 100% bilaterally  Patient removed hearing aid devices  Trace lower extremity edema      Lela was seen today for [facility] care, medicare wellness visit and other.    Diagnoses and all orders for this visit:    Medicare annual wellness visit, subsequent  -     LIPID PANEL WITH REFLEX; Future  -     CBC WITH DIFFERENTIAL; Future  -     COMPREHENSIVE MET PNL (RANDOM); Future  -     THYROID STIMULATING HORMONE; Future    Postmenopausal status (age-related) (natural)  -     BD DEXA AXIAL SKELETON; Future  -     LIPID PANEL WITH REFLEX; Future  -     CBC WITH DIFFERENTIAL; Future  -     COMPREHENSIVE MET PNL (RANDOM); Future  -     THYROID STIMULATING HORMONE; Future    Dysthymic disorder  -     citalopram (CeleXA) 20 MG tablet; Take 1 tablet by mouth daily.  -     LIPID PANEL WITH REFLEX; Future  -     CBC WITH DIFFERENTIAL; Future  -     COMPREHENSIVE MET PNL (RANDOM); Future  -     THYROID STIMULATING HORMONE; Future    Essential hypertension, benign  -     hydrochlorothiazide (HYDRODIURIL) 25 MG tablet; Take 1 tablet by mouth daily.  -     LIPID PANEL WITH REFLEX; Future  -     CBC WITH DIFFERENTIAL; Future  -     COMPREHENSIVE MET PNL (RANDOM); Future  -     THYROID STIMULATING HORMONE; Future    Mixed hyperlipidemia  -     LIPID PANEL WITH REFLEX; Future  -     CBC WITH DIFFERENTIAL; Future  -     COMPREHENSIVE MET PNL (RANDOM); Future  -     THYROID STIMULATING HORMONE; Future    Hypothyroidism, unspecified type  -     levothyroxine 75 MCG tablet; Take 1 tablet by mouth daily.  -     LIPID PANEL WITH REFLEX; Future  -     CBC WITH DIFFERENTIAL; Future  -     COMPREHENSIVE MET PNL (RANDOM); Future  -     THYROID STIMULATING HORMONE;  Future    Sensorineural hearing loss, bilateral  -     SERVICE TO ENT    Bilateral hearing loss due to cerumen impaction  -     SERVICE TO ENT    Other orders  -     meloxicam (MOBIC) 7.5 MG tablet; Take 1 tablet by mouth daily.      Dysthymic disorder  Stable  Continue present management    Hyperlipidemia  Controlled  Continue present management    Hypertension with trace lower extremity edema today  Uncontrolled since running out of blood pressure medication  Stop triamterene portion of medication  Restart hydrochlorothiazide 25 mg daily  Continue present management  Check blood pressure in 1 month    Bilateral cerumen impaction  Follow-up with ENT for removal  No resolution with irrigation.  Patient will not be charged for today's visit for irrigation  No complications with irrigation performed by medical assistant Cristino.    Hypothyroidism  Controlled  Continue present management    Osteoporosis [place]  DEXA scan ordered    Cardiovascular screening  Stress test on 9/19/2018 negative for ischemia        Medical compliance with plan discussed and risks of non-compliance reviewed.    Patient education completed on disease process, etiology & prognosis.    Patient expresses understanding of the plan.    Proper usage and side effects of medications reviewed & discussed.    Risks and benefits of medications discussed at length with the patient.  Refer to orders.    Return to clinic as clinically indicated as discussed with patient who verbalized understanding of & agreement with the plan.    Written handout given.       Electronically signed by: Gerry Caro DO on  07/20/21            [facility] [place] [address]   [address] [facility] [address]   Phone: [phone]   Fax: [phone]   no

## 2021-08-19 NOTE — DISCHARGE NOTE NURSING/CASE MANAGEMENT/SOCIAL WORK - NSFLUVACAGEDISCH_IMM_ALL_CORE
Adult Itraconazole Counseling:  I discussed with the patient the risks of itraconazole including but not limited to liver damage, nausea/vomiting, neuropathy, and severe allergy.  The patient understands that this medication is best absorbed when taken with acidic beverages such as non-diet cola or ginger ale.  The patient understands that monitoring is required including baseline LFTs and repeat LFTs at intervals.  The patient understands that they are to contact us or the primary physician if concerning signs are noted.

## 2021-08-19 NOTE — PROGRESS NOTE ADULT - SUBJECTIVE AND OBJECTIVE BOX
pt seen  no complaints  denying pain  ICU Vital Signs Last 24 Hrs  T(C): 36.5 (13 Nov 2018 04:55), Max: 36.7 (12 Nov 2018 13:25)  T(F): 97.7 (13 Nov 2018 04:55), Max: 98 (12 Nov 2018 13:25)  HR: 69 (13 Nov 2018 04:55) (60 - 69)  BP: 166/75 (13 Nov 2018 04:55) (131/70 - 166/75)  BP(mean): --  ABP: --  ABP(mean): --  RR: 18 (13 Nov 2018 04:55) (16 - 18)  SpO2: 96% (13 Nov 2018 04:55) (94% - 98%)  gen-NAD  resp-clear  abd-soft NT/ND                          8.3    10.42 )-----------( 396      ( 13 Nov 2018 07:46 )             27.4     11-13    135  |  99  |  26<H>  ----------------------------<  161<H>  4.7   |  27  |  4.60<H>    Ca    8.2<L>      13 Nov 2018 07:46    TPro  7.3  /  Alb  2.0<L>  /  TBili  0.7  /  DBili  x   /  AST  23  /  ALT  19  /  AlkPhos  260<H>  11-12    < from: CT Abdomen No Cont (11.12.18 @ 15:42) >    Cholecystostomy tube in the right upper quadrant appears outside the   gallbladder consistent with malpositioning of the tube. There is again   evidence of cholelithiasis and inflammatory change surrounding the   gallbladder consistent with cholecystitis. There is suggestion of   choledocholithiasis with a 5 mm stone suggested in the CBD on coronal   image 57 although this was not corroborated on the recent MRCP.  Small left renal cyst    < end of copied text > 19-Aug-2021 23:44

## 2021-09-18 NOTE — OCCUPATIONAL THERAPY INITIAL EVALUATION ADULT - LEVEL OF INDEPENDENCE: DRESS UPPER BODY, OT EVAL
Pt comes into triage, states \"I think i'm having an allergic to a medication\" pt anxious, no lip or tongue swelling present  
maximum assist (25% patients effort)

## 2021-12-13 NOTE — DISCHARGE NOTE ADULT - INSTRUCTIONS
Pt got burned about an hour ago at work from hot water on right hip. Burn appears superficial.    Low fat diet. Please consume a low fat, consistent carbohydrate, renal diet.

## 2022-01-13 NOTE — PATIENT PROFILE ADULT - FUNCTIONAL SCREEN CURRENT LEVEL: COMMUNICATION, MLM
[FreeTextEntry1] : Mr. 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 He presented with Cr of 4mg/dl in setting of poor PO intake and ARB use. \par --- suspect that his renal injury was likely ATN that is improving now back to 1.7 range\par - most recent Cr from this morning being 2.1mg/dL; advised patient to \par - no other changes, BP is normal to low, if continues to be this low, can even dc norvasc\par \par #HTN\par -as above, off hydralazine\par - may even have of come off CCB\par \par # CKD and planning for immunotherapy and chemotherapy\par It is ok to use immunotherapy in CKD patients without any dose adjustments \par However other chemo and targeted therapies may require dose adjustments\par would avoid using a PPI when giving pembrolizumab as PPIs are the biggest risk factor for AIN and GIOVANNI post use of immune checkpt inhibitors. \par ---- can use H2 blocker\par \par # ureteral stent- pt to see Urology for usual replacements\par Recent UTI noted\par \par f/i 3 months\par 
0 = understands/communicates without difficulty

## 2022-03-31 NOTE — ED ADULT NURSE NOTE - NS ED PATIENT SAFETY CONCERN
unlabored without accessory muscle use,normal breath sounds Unable to assess due to medical condition

## 2022-04-05 NOTE — PATIENT PROFILE ADULT - FINANCIAL CONCERNS
other Double O-Z Flap Text: The defect edges were debeveled with a #15 scalpel blade.  Given the location of the defect, shape of the defect and the proximity to free margins a Double O-Z flap was deemed most appropriate.  Using a sterile surgical marker, an appropriate transposition flap was drawn incorporating the defect and placing the expected incisions within the relaxed skin tension lines where possible. The area thus outlined was incised deep to adipose tissue with a #15 scalpel blade.  The skin margins were undermined to an appropriate distance in all directions utilizing iris scissors.

## 2022-04-11 NOTE — PATIENT PROFILE ADULT - NSPROGENARRIVEDFROM_GEN_A_NUR
Pharmacist Renal Dose Adjustment Note    Jamey Lei is a 77 y.o. male being treated with the medication zosyn    Patient Data:    Vital Signs (Most Recent):  Temp: 98.1 °F (36.7 °C) (04/11/22 0450)  Pulse: 85 (04/11/22 0450)  Resp: 20 (04/11/22 0450)  BP: (!) 155/83 (04/11/22 0450)  SpO2: 97 % (04/11/22 0450) Vital Signs (72h Range):  Temp:  [97.5 °F (36.4 °C)-98.6 °F (37 °C)]   Pulse:  []   Resp:  [14-22]   BP: (138-155)/(75-91)   SpO2:  [95 %-98 %]      Recent Labs   Lab 04/10/22  2226   CREATININE 1.7*     Serum creatinine: 1.7 mg/dL (H) 04/10/22 2226  Estimated creatinine clearance: 43.1 mL/min (A)    Medication:zosyn dose: 4.5g frequency q12h will be changed to medication:zosyn dose:4.5g frequency:q8h    Pharmacist's Name: Katerin Cannon  Pharmacist's Extension:    home

## 2022-04-30 NOTE — ED ADULT TRIAGE NOTE - AS HEIGHT TYPE
You can access the FollowMyHealth Patient Portal offered by [facility] by registering at the following website: [email or web address]. By joining [facility]’s FollowMyHealth portal, you will also be able to view your health information using other applications (apps) compatible with our system. stated

## 2022-06-15 NOTE — PATIENT PROFILE ADULT. - NS PRO REFERRAL CMGT
37 weeks 2 days.  Good fetal movement.  She denies leaking of fluid, bleeding or contractions.  Kick counts and labor instructions were reviewed.  GBS testing was negative.  Cervix is unchanged from her last visit.  Follow up in 1 week.   Pt resting w/ eyes closed, easily arousable and in no obvious distress. Pt is A & O x 3, denies SOB, fever, chills and N/V/D. No respiratory distress observed, respirations are even and unlabored. Skin is warm, dry and pink. VS. Wife at BS. Will continue to monitor closely.   Discharge planning/PT eval

## 2022-06-20 NOTE — PROGRESS NOTE ADULT - SUBJECTIVE AND OBJECTIVE BOX
Patient is healing well. Vision should continue to improve. Continue following the drop calendar. [facility], Division of Infectious Diseases  TIKA Martinez Y. Patel, S. Shah  [phone]  ([phone] - weekdays after 5pm and weekends)    Name: IRENE TAYLOR  Age/Gender: 71y Female  MRN: [id number]    Interval History:  Patient resting comfortably in bed this morning.   No new complaints noted. Notes reviewed. Afebrile.     Allergies: ertapenem (Urticaria)  Purell (Rash)    Objective:  Vitals:   T(F): 98 (04-22-21 @ 12:00), Max: 98.2 (04-21-21 @ 20:00)  HR: 89 (04-22-21 @ 12:00) (80 - 102)  BP: 107/63 (04-22-21 @ 12:00) (107/63 - 160/68)  RR: 18 (04-22-21 @ 12:00) (18 - 19)  SpO2: 98% (04-22-21 @ 12:00) (92% - 100%)  Physical Examination:  General: no acute distress, nontoxic appearing  HEENT: normocephalic, atraumatic  Respiratory: no acc muscle use, breathing comfortably  Cardiovascular: S1 S2 present, normal rate  Gastrointestinal: normal appearing, nondistended  Extremities: no edema, no cyanosis  Skin: warm, dry, no visible rash  Lines: PIV    Laboratory Studies:  CBC:                       10.2   12.72 )-----------( 404      ( 22 Apr 2021 10:28 )             32.7     WBC trend:  WBC Count: 12.72 K/uL (04-22-21 @ 10:28)  WBC Count: 12.83 K/uL (04-21-21 @ 14:18)  WBC Count: 12.42 K/uL (04-20-21 @ 11:34)  WBC Count: 12.95 K/uL (04-19-21 @ 06:28)  WBC Count: 12.90 K/uL (04-18-21 @ 07:33)  WBC Count: 14.27 K/uL (04-17-21 @ 08:24)    CMP: 04-22    137  |  100  |  54<H>  ----------------------------<  162<H>  4.2   |  26  |  7.10<H>    Ca    8.8      22 Apr 2021 10:28              Microbiology:  4/20 - COVID-19 PCR - negative  4/13 - COVID-19 spike domain ab - positive (>250)  4/12 - blood culturmices - negative x2  4/12 - COVID-19 PCR - negative    Radiology: reviewed, no new imaging in past 24h    Medications:  MEDICATIONS  (STANDING):  albuterol/ipratropium for Nebulization 3 milliLiter(s) Nebulizer every 6 hours  atorvastatin 10 milliGRAM(s) Oral at bedtime  clopidogrel Tablet 75 milliGRAM(s) Oral daily  heparin   Injectable 5000 Unit(s) SubCutaneous every 8 hours  insulin glargine Injectable (LANTUS) 12 Unit(s) SubCutaneous at bedtime  insulin lispro (ADMELOG) corrective regimen sliding scale   SubCutaneous three times a day before meals  insulin lispro (ADMELOG) corrective regimen sliding scale   SubCutaneous at bedtime  insulin lispro Injectable (ADMELOG) 5 Unit(s) SubCutaneous before breakfast  insulin lispro Injectable (ADMELOG) 5 Unit(s) SubCutaneous before lunch  insulin lispro Injectable (ADMELOG) 5 Unit(s) SubCutaneous before dinner  metoprolol succinate ER 12.5 milliGRAM(s) Oral daily  risperiDONE   Tablet 0.5 milliGRAM(s) Oral two times a day    MEDICATIONS  (PRN):  acetaminophen   Tablet .. 650 milliGRAM(s) Oral every 6 hours PRN Mild Pain (1 - 3)  haloperidol    Injectable 1 milliGRAM(s) IntraMuscular every 8 hours PRN Agitation    Antimicrobials:  s/p cefuroxime 4/20-4/21  s/p ceftriaxone 4/17-4/20

## 2022-07-23 NOTE — H&P ADULT - PROBLEM SELECTOR PLAN 3
Electronically signed by Isidro Whiting DO on 2022 at 10:47 AM SCDs for DVT prevention

## 2022-08-17 NOTE — ED ADULT NURSE NOTE - NS ED NURSE TRANSPORT WITH
Cardiac Monitor/Defib/ACLS/Rescue Kit/O2/BVM/oxygen/IV pump/pulse ox/ventilator Isotretinoin Counseling: Patient should get monthly blood tests, not donate blood, not drive at night if vision affected, not share medication, and not undergo elective surgery for 6 months after tx completed. Side effects reviewed, pt to contact office should one occur.

## 2022-10-11 NOTE — PROVIDER CONTACT NOTE (OTHER) - BACKGROUND
ptl precautions. On lamictal for seizure disorder. Had anatomy with mfm. ptl precautions. Flying to [place] in 2 weeks. Low lying placenta likely will resolve. rtc 4 weeks.
Patient admitted for HD w/o access Md saeed made aware and made aware that patient has not been seen by nephrologist, Dr Saeed stated "I will speak to nephrology"

## 2022-10-30 NOTE — PHYSICAL THERAPY INITIAL EVALUATION ADULT - ORIENTATION, REHAB EVAL
You are seen in the emergency department after falling and hitting your kitchen appliance. You have evidence of a lumbar transverse fractures L3-L4 and bruising noted to the adjacent psoas muscle. You likely feels symptoms of hip strain. Recommend Voltaren gel Tylenol and numbing patches which were prescribing you. Drink plenty of fluids. Follow-up with your primary doctor for reevaluation of this as well as your anemia which is slightly worse than normal.  Follow-up with orthospine call and arrange follow-up appointment. Return to emergency department if you have any worsening symptoms or loss of consciousness severe pain numbness weakness or other emergent concerns. person

## 2022-11-29 NOTE — ED ADULT TRIAGE NOTE - PAIN RATING/NUMBER SCALE (0-10): ACTIVITY
Ms. Paige is a 51 year old female who presents in follow up for for facial acne. Patient reports she is taking Spirolactone 50 mg twice daily with no side effects. Patient also uses dapsone gel. Patient reports still having the  occasional breakout but not getting the \"deep ones\" like she used to.      Blood Pressure: 116/68     Patient last seen: 11/19/2021  Patient presents with: self  Pt denies other concerning skin lesions.      PMH:    Past Medical History:   Diagnosis Date   • Deviated nasal septum 4/24/2009   • Dysplasia of cervix (uteri) 1999   • Lumbosacral disc herniation 10/13/2020    L4-5   • Other and unspecified hyperlipidemia 2009   • PAST MEDICAL HISTORY     tendonitis r shoulder   • Rosacea    • Unspecified sinusitis (chronic) 4/24/2009       FAM HX:  Denies a h/o Melanoma, dysplastic nevi, or NMSC.  SOC HX:    Tobacco Use: Quit          Packs/Day: .5    Years: 10         Quit date: 04/10/1993      Alcohol Use: Yes           0 oz/week       Comment: 1-2 per month. socially    ROS:  The patient denies symptoms of fever, chills, night sweats and fatigue.  Also see HPI for skin ROS.    MEDS/ALLERGIES:  Meds and allergies were reviewed on EMR.    Current Outpatient Medications   Medication Sig Dispense Refill   • rosuvastatin (CRESTOR) 10 MG tablet Take 1 tablet by mouth daily. 90 tablet 3   • spironolactone (ALDACTONE) 50 MG tablet Take 1 tablet by mouth 2 times daily. 180 tablet 3   • sumatriptan (Imitrex) 100 MG tablet Take 1 tablet by mouth daily as needed for Migraine. 9 tablet 5   • norethindrone-ethinyl estradiol (Dasetta 1/35) 1-35 MG-MCG per tablet Take 1 tablet by mouth daily. 84 tablet 3   • ketoconazole (NIZORAL) 2 % cream Apply topically to affected area daily as directed. 30 g 0   • dapsone 5 % gel Apply topically daily. 60 g 0   • azelaic acid (Finacea) 15 % gel Apply twice daily for rosacea 50 g 3   • aspirin-acetaminophen-caffeine (EXCEDRIN MIGRAINE) 250-250-65 MG per tablet Take 1  tablet by mouth every 6 hours as needed.     • MULTI-VITAMIN PO TABS 1 TABLET DAILY 0 0     No current facility-administered medications for this visit.     PE:   Healthy appearing white female in no acute distress.  Skin examination includes face. 3 deep, inflammatory papules on the chin with postinflammatory hyperpigmentation.  There are no other concerning skin lesions.    Assessment/Plan   1.  Acne vulgaris- adult female hormonal nodular acne of the jawline. Acne usually quiet but still experiencing occasional breakouts that dapsone controls.     Still inflammatory on the nose and chin. Continue using dapsone gel.  Superficial FLARE.     Continue Spironolactone 50 mg PO BID.  The anti-androgen mechanism was discussed.  Pt is aware that this is a contraindicated drug in pregnancy and under no circumstances can she conceive while on this medication.  She has verbalized understanding. Side effects were reviewed with patient including electrolyte disturbances, blood pressure changes which could manifest as dizziness or headaches. Pt instructed to report all new medications before starting them. All questions were addressed. /68. K level checked 8/2/2022      -Recommend follow up in 1 year.       ITiffany LPN, attest that I performed the duties of a scribe for services personally performed by Dr. Parks.    Geovanna CRENSHAW M.D.,  attest that the documentation recorded by the scribe accurately and completely reflects the service(s) I personally performed and the decisions made by me.           MD/LPN donned appropriate personal protective equipment including face mask and eye protection at all times while within 6 feet or physical contact of patient, per protocol.                       3

## 2022-12-05 NOTE — ED ADULT TRIAGE NOTE - HEIGHT IN CM
Well Adult Note  Name: Christiano Limon Date: 2022   MRN: [id number] Sex: Female   Age: 45 y.o. Ethnicity: Non- / Non    : 1970 Race: White (non-)      Betty Rubin is here for well adult exam.        Review of Systems   Constitutional:  Negative for activity change, appetite change, chills, fatigue and fever. HENT:  Negative for ear pain, facial swelling, postnasal drip and sore throat. Eyes:  Negative for pain and redness. Respiratory:  Negative for cough, shortness of breath, wheezing and stridor. Cardiovascular:  Negative for chest pain, palpitations and leg swelling. Gastrointestinal:  Negative for nausea, rectal pain and vomiting. Endocrine: Negative for polyphagia and polyuria. Genitourinary:  Negative for flank pain, hematuria and urgency. Musculoskeletal:  Negative for back pain and gait problem. Skin:  Negative for pallor, rash and wound. Neurological:  Negative for dizziness, tremors, syncope, weakness, light-headedness and numbness. Psychiatric/Behavioral:  Negative for self-injury, sleep disturbance and suicidal ideas. The patient is not nervous/anxious. No Known Allergies      Prior to Visit Medications    Medication Sig Taking? Authorizing Provider   atorvastatin (LIPITOR) 40 MG tablet Take 1 tablet by mouth daily Yes Shelby Elder MD   b complex vitamins capsule Take 1 capsule by mouth daily Yes Historical Provider, MD   Multiple Vitamins-Minerals (THERAPEUTIC MULTIVITAMIN-MINERALS) tablet Take 1 tablet by mouth daily.  Yes Historical Provider, MD   CALCIUM PO Take 500 mg by mouth 2 times daily  Yes Historical Provider, MD         Past Medical History:   Diagnosis Date    Back pain     Cancer ([facility] [address]     ovarian    Headache     Hyperlipidemia        Past Surgical History:   Procedure Laterality Date    ABDOMEN SURGERY      HYSTERECTOMY (CERVIX STATUS UNKNOWN)           Family History   Problem Relation Age of Onset    High Cholesterol Mother     Cancer Father         Prostate Cancer    Breast Cancer Paternal Aunt     Breast Cancer Paternal Cousin        Social History     Tobacco Use    Smoking status: Former     Packs/day: 0.50     Years: 15.00     Pack years: 7.50     Types: Cigarettes     Start date:      Quit date:      Years since quittin.9    Smokeless tobacco: Never   Vaping Use    Vaping Use: Never used   Substance Use Topics    Alcohol use: Yes     Comment: rare    Drug use: No       Objective   BP (!) 143/82 (Site: Left Upper Arm, Position: Sitting, Cuff Size: Large Adult)   Pulse 78   Temp 98.2 °F (36.8 °C) (Temporal)   Resp 18   Ht 5' 5\" (1.651 m)   Wt 187 lb (84.8 kg)   SpO2 96%   BMI 31.12 kg/m²   Wt Readings from Last 3 Encounters:   22 187 lb (84.8 kg)   21 183 lb (83 kg)   21 181 lb (82.1 kg)     There were no vitals filed for this visit. Physical Exam  Constitutional:       General: She is not in acute distress. Appearance: Normal appearance. She is not ill-appearing. HENT:      Head: Normocephalic and atraumatic. Eyes:      Conjunctiva/sclera: Conjunctivae normal.      Pupils: Pupils are equal, round, and reactive to light. Cardiovascular:      Rate and Rhythm: Normal rate and regular rhythm. Heart sounds: Normal heart sounds. No murmur heard. No friction rub. No gallop. Pulmonary:      Effort: Pulmonary effort is normal.      Breath sounds: Normal breath sounds. No wheezing, rhonchi or rales. Abdominal:      General: Bowel sounds are normal.      Palpations: Abdomen is soft. There is no mass. Tenderness: There is no abdominal tenderness. Musculoskeletal:         General: No swelling. Right lower leg: No edema. Left lower leg: No edema. Skin:     General: Skin is warm. Capillary Refill: Capillary refill takes less than 2 seconds. Coloration: Skin is not jaundiced or pale. Findings: No lesion.    Neurological: General: No focal deficit present. Mental Status: She is alert and oriented to person, place, and time. Mental status is at baseline. Sensory: No sensory deficit. Motor: No weakness. Assessment   Plan   1. Encounter for screening mammogram for malignant neoplasm of breast       Personalized Preventive Plan   Current Health Maintenance Status  Immunization History   Administered Date(s) Administered    COVID-19, MODERNA BLUE border, Primary or Immunocompromised, (age 12y+), IM, 100 mcg/0.5mL 05/20/2021, 06/17/2021    Influenza Vaccine, unspecified formulation 10/15/2016    Influenza Virus Vaccine 11/08/2017, 11/06/2018, 10/13/2020, 11/18/2021        Health Maintenance   Topic Date Due    Hepatitis C screen  Never done    DTaP/Tdap/Td vaccine (1 - Tdap) Never done    Shingles vaccine (1 of 2) Never done    COVID-19 Vaccine (4 - Booster for Moderna series) 02/24/2022    Depression Screen  08/10/2022    Lipids  12/02/2022    Breast cancer screen  12/02/2023    Diabetes screen  12/02/2024    Colorectal Cancer Screen  10/30/2027    Flu vaccine  Completed    HIV screen  Completed    Hepatitis A vaccine  Aged Out    Hib vaccine  Aged Out    Meningococcal (ACWY) vaccine  Aged Out    Pneumococcal 0-64 years Vaccine  Aged [employer] ordered, lipid panel. RTC in 3 months      Recommendations for Preventive Services Due: see orders and patient instructions/AVS.      No follow-ups on file. 154.94

## 2022-12-18 NOTE — PROGRESS NOTE ADULT - PROBLEM/PLAN-5
DISPLAY PLAN FREE TEXT
Yes

## 2023-03-02 NOTE — PROGRESS NOTE ADULT - PROBLEM SELECTOR PLAN 1
Crys Steel comes into clinic today at the request of Dr. Early, ordering provider for phototherapy.    This service provided today was under the supervising provider of the day Dr. Kilgore, who was available if needed.    [facility] Phototherapy Record  1. Crys Steel is a 21 year old female is here today for phototherapy (UVB) treatment for Atopic dermatitis.        Changes or new medications since last treatment (If yes, notify MD):  NO    New medical conditions (If yes, notify MD):  NO    Any problems with last phototherapy treatment (If yes, notify MD)?  NO    Patient denies any remaining skin redness since last treatment (If no, do not treat. Do not treat red skin):  YES    Did staff apply any topicals on patient?  NO  If yes, which topical?      Did patient self apply any topicals?  NO  If yes, which topical?      2. The patient tolerated phototherapy without complication.    Patient will return per protocol for next UVB treatment, per protocol.     Patient to see provider every 4-12 weeks for follow-up during treatment (if no, notify treating physician):  YES.     All questions and concerns discussed with patient in clinic today.    Tati Medellin RN   am labs pending  s/p perc c-tube  repeat blood/bile fluid cxs ngtd  cont abx per id, last day 11/7  serial lfts  minimal drain output, ?need for tube study check  f/u further surgery recs  id following  monitor abd exam  pain control  diet as tolerated  will follow

## 2023-07-29 NOTE — PROGRESS NOTE ADULT - ASSESSMENT
Patient is a 71 year old female with PMH of CAD s/p stents '07, HTN, MI, PAF, liver abscess, s/p biliary stent with removal (11/2018; 7/2/19), chronic pancreatitis, DM2 on insulin, neuropathy, ESRD (5/2018) on HD (M/W/F) who presented (4/12/21) adm with hypoxic renal failure.    Leukocytosis  despite abn lung imaging pt appears clinically stable with no clear indication of airspace disease, elevation is neutrophilia without clearing of eosinophils, recommend short course fo empiric CEFTRIAXONE-started 4/17 for possible PNA - end 4/21  leukocytosis stable, afebrile - monitor clinically, WBC and temps    PNA  as above         Problem: Discharge Planning  Goal: Discharge to home or other facility with appropriate resources  7/29/2023 0833 by Romel Quan RN  Outcome: Progressing  Flowsheets (Taken 7/29/2023 8374)  Discharge to home or other facility with appropriate resources:   Identify barriers to discharge with patient and caregiver   Identify discharge learning needs (meds, wound care, etc)     Problem: Safety - Adult  Goal: Free from fall injury  7/29/2023 0833 by Romel Quan RN  Outcome: Progressing  Flowsheets (Taken 7/29/2023 5741)  Free From Fall Injury:   Instruct family/caregiver on patient safety   Based on caregiver fall risk screen, instruct family/caregiver to ask for assistance with transferring infant if caregiver noted to have fall risk factors     Problem: ABCDS Injury Assessment  Goal: Absence of physical injury  7/29/2023 0833 by Romel Quan RN  Outcome: Progressing  Flowsheets  Taken 7/29/2023 0833  Absence of Physical Injury: Implement safety measures based on patient assessment  Taken 7/29/2023 0809  Absence of Physical Injury: Implement safety measures based on patient assessment     Problem: Nutrition Deficit:  Goal: Optimize nutritional status  7/29/2023 0833 by Romel Quan RN  Outcome: Progressing  Flowsheets (Taken 7/29/2023 4263)  Nutrient intake appropriate for improving, restoring, or maintaining nutritional needs: Monitor oral intake, labs, and treatment plans     Problem: Neurosensory - Adult  Goal: Achieves stable or improved neurological status  Outcome: Progressing  Flowsheets (Taken 7/29/2023 0834)  Achieves stable or improved neurological status:   Assess for and report changes in neurological status   Maintain blood pressure and fluid volume within ordered parameters to optimize cerebral perfusion and minimize risk of hemorrhage     Problem: Skin/Tissue Integrity - Adult  Goal: Skin integrity remains intact  Outcome: Progressing  Flowsheets (Taken 7/29/2023 0834)  Skin Integrity Remains Intact: Monitor for areas of redness and/or skin breakdown     Problem: Skin/Tissue Integrity - Adult  Goal: Incisions, wounds, or drain sites healing without S/S of infection  Outcome: Progressing  Flowsheets (Taken 7/29/2023 0834)  Incisions, Wounds, or Drain Sites Healing Without Sign and Symptoms of Infection: TWICE DAILY: Assess and document dressing/incision, wound bed, drain sites and surrounding tissue     Problem: Musculoskeletal - Adult  Goal: Return mobility to safest level of function  Outcome: Progressing  Flowsheets (Taken 7/29/2023 0834)  Return Mobility to Safest Level of Function:   Assess patient stability and activity tolerance for standing, transferring and ambulating with or without assistive devices   Assist with transfers and ambulation using safe patient handling equipment as needed     Problem: Infection - Adult  Goal: Absence of infection during hospitalization  Outcome: Progressing  Flowsheets (Taken 7/29/2023 0834)  Absence of infection during hospitalization:   Assess and monitor for signs and symptoms of infection   Monitor lab/diagnostic results     Problem: Metabolic/Fluid and Electrolytes - Adult  Goal: Electrolytes maintained within normal limits  Outcome: Progressing  Flowsheets (Taken 7/29/2023 0834)  Electrolytes maintained within normal limits: Monitor labs and assess patient for signs and symptoms of electrolyte imbalances

## 2023-08-18 NOTE — PROGRESS NOTE ADULT - PROBLEM/PLAN-8
DISPLAY PLAN FREE TEXT
Doctor's office

## 2023-09-15 NOTE — PHYSICAL THERAPY INITIAL EVALUATION ADULT - BALANCE DISTURBANCE, IDENTIFIED IMPAIRMENT CONTRIBUTE, REHAB EVAL
Please return for worsening symptoms, worsening pain, fever, chills, nausea, vomiting. Please follow up with your primary doctor. Take tylenol or motrin as needed.     I hope you feel better soon!    Sincerely,  Ileana Bahena MD
decreased ROM

## 2023-10-03 NOTE — PROGRESS NOTE ADULT - PROBLEM SELECTOR PROBLEM 2
Cholangitis Complex Repair And Single Advancement Flap Text: The defect edges were debeveled with a #15 scalpel blade.  The primary defect was closed partially with a complex linear closure.  Given the location of the remaining defect, shape of the defect and the proximity to free margins a single advancement flap was deemed most appropriate for complete closure of the defect.  Using a sterile surgical marker, an appropriate advancement flap was drawn incorporating the defect and placing the expected incisions within the relaxed skin tension lines where possible.    The area thus outlined was incised deep to adipose tissue with a #15 scalpel blade.  The skin margins were undermined to an appropriate distance in all directions utilizing iris scissors.

## 2023-11-07 NOTE — ED ADULT NURSE NOTE - HIV OFFER
Substance Abuse Resource/Discharge Planning       Writer met with Pt and her sister in law (Nohemy Ortiz) per their request.     Pt was requesting that resources be provided to her sister in law, so they can start looking them up. Pt sister in law will look into places so the pt can get treatment after she is done with her medical treatment.    Writer provided resources that would work with the pt's insurance.    Addiction Counselor team will support pt with discharge planning as needed.   Opt out

## 2023-11-29 NOTE — PROGRESS NOTE ADULT - SUBJECTIVE AND OBJECTIVE BOX
Patient is a 70y old  Female who presents with a chief complaint of fall (08 Feb 2020 16:38)      INTERVAL /OVERNIGHT EVENTS: No events over night. Family Bed side.     T(C): 36.5 (02-09-20 @ 15:21), Max: 36.5 (02-09-20 @ 15:21)  HR: 67 (02-09-20 @ 15:21) (67 - 67)  BP: 138/73 (02-09-20 @ 15:21) (138/73 - 138/73)  RR: 18 (02-09-20 @ 15:21) (18 - 18)  SpO2: 97% (02-09-20 @ 15:21) (97% - 97%)      MEDICATIONS  (STANDING):  aspirin enteric coated 81 milliGRAM(s) Oral daily  atorvastatin 80 milliGRAM(s) Oral at bedtime  dextrose 5%. 1000 milliLiter(s) (50 mL/Hr) IV Continuous <Continuous>  dextrose 50% Injectable 12.5 Gram(s) IV Push once  dextrose 50% Injectable 25 Gram(s) IV Push once  dextrose 50% Injectable 25 Gram(s) IV Push once  gabapentin 300 milliGRAM(s) Oral two times a day  heparin  Injectable 5000 Unit(s) SubCutaneous every 12 hours  insulin glargine Injectable (LANTUS) 12 Unit(s) SubCutaneous at bedtime  insulin lispro (HumaLOG) corrective regimen sliding scale   SubCutaneous three times a day before meals  insulin lispro (HumaLOG) corrective regimen sliding scale   SubCutaneous at bedtime  isosorbide   mononitrate ER Tablet (IMDUR) 60 milliGRAM(s) Oral daily  metoprolol tartrate 50 milliGRAM(s) Oral two times a day  multivitamin 1 Tablet(s) Oral daily  NIFEdipine XL 30 milliGRAM(s) Oral daily    MEDICATIONS  (PRN):  acetaminophen   Tablet .. 650 milliGRAM(s) Oral every 6 hours PRN Mild Pain (1 - 3)  dextrose 40% Gel 15 Gram(s) Oral once PRN Blood Glucose LESS THAN 70 milliGRAM(s)/deciLiter  glucagon  Injectable 1 milliGRAM(s) IntraMuscular once PRN Glucose <70 milliGRAM(s)/deciLiter    Allergies    ertapenem (Urticaria)  Purell (Rash)    Intolerances        REVIEW OF SYSTEMS:  CONSTITUTIONAL: No fever, weight loss, or fatigue  EYES: No eye pain, visual disturbances, or discharge  ENMT:  No difficulty hearing, tinnitus, vertigo; No sinus or throat pain  NECK: No pain or stiffness  RESPIRATORY: No cough, wheezing, chills or hemoptysis; No shortness of breath  CARDIOVASCULAR: No chest pain, palpitations, dizziness, or leg swelling  GASTROINTESTINAL: No abdominal or epigastric pain. No nausea, vomiting, or hematemesis; No diarrhea or constipation. No melena or hematochezia.  GENITOURINARY: No dysuria, frequency, hematuria, or incontinence  NEUROLOGICAL: No headaches, memory loss, loss of strength, numbness, or tremors  SKIN: No itching, burning, rashes, or lesions   LYMPH NODES: No enlarged glands  ENDOCRINE: No heat or cold intolerance; No hair loss; No polydipsia or polyuria  MUSCULOSKELETAL: + joint pain or swelling; No muscle, back, or extremity pain  PSYCHIATRIC: No depression, anxiety, mood swings, or difficulty sleeping  HEME/LYMPH: No easy bruising, or bleeding gums  ALLERGY AND IMMUNOLOGIC: No hives or eczema    PHYSICAL EXAM:  GENERAL: NAD, well-groomed, well-developed  HEAD:  Atraumatic, Normocephalic  EYES: EOMI, conjunctiva and sclera clear  ENMT: No tonsillar erythema, exudates, or enlargement; Moist mucous membranes, Good dentition, No lesions  NECK: Supple, No JVD, Normal thyroid  NERVOUS SYSTEM:  Alert & Oriented X3, Good concentration; Motor Strength 5/5 B/L upper and lower extremities; DTRs 2+ intact and symmetric  CHEST/LUNG: Clear to auscultation bilaterally; No rales, rhonchi, wheezing, or rubs  HEART: Regular rate and rhythm; No murmurs, rubs, or gallops  ABDOMEN: Soft, Nontender, Nondistended; Bowel sounds present  EXTREMITIES:  2+ Peripheral Pulses, No clubbing, cyanosis, or edema  LYMPH: No lymphadenopathy noted  SKIN: No rashes or lesions                          10.3   11.55 )-----------( 254      ( 09 Feb 2020 13:16 )             33.2               132|96|58<280  5.1|28|5.30  8.2,--,--  02-09 @ 13:16    CAPILLARY BLOOD GLUCOSE      POCT Blood Glucose.: 222 mg/dL (09 Feb 2020 21:04)  POCT Blood Glucose.: 242 mg/dL (09 Feb 2020 16:42)  POCT Blood Glucose.: 274 mg/dL (09 Feb 2020 11:38)  POCT Blood Glucose.: 201 mg/dL (09 Feb 2020 07:49)  POCT Blood Glucose.: 234 mg/dL (08 Feb 2020 22:05)    RADIOLOGY & ADDITIONAL TESTS:    Notes Reviewed:  [x ] YES  [ ] NO    Care Discussed with Consultants/Other Providers [x ] YES  [ ] NO Detail Level: Detailed Depth Of Biopsy: dermis Was A Bandage Applied: Yes Size Of Lesion In Cm: 1 X Size Of Lesion In Cm: 0 Biopsy Type: H and E Biopsy Method: Dermablade Anesthesia Type: bacteriostatic saline Anesthesia Volume In Cc: 0.2 Hemostasis: Drysol Wound Care: Petrolatum Dressing: bandage Destruction After The Procedure: No Type Of Destruction Used: Curettage Curettage Text: The wound bed was treated with curettage after the biopsy was performed. Cryotherapy Text: The wound bed was treated with cryotherapy after the biopsy was performed. Electrodesiccation Text: The wound bed was treated with electrodesiccation after the biopsy was performed. Electrodesiccation And Curettage Text: The wound bed was treated with electrodesiccation and curettage after the biopsy was performed. Silver Nitrate Text: The wound bed was treated with silver nitrate after the biopsy was performed. Lab: -[id number] Lab Facility: 3 Consent: Written consent was obtained and risks were reviewed including but not limited to scarring, infection, bleeding, scabbing, incomplete removal, nerve damage and allergy to anesthesia. Post-Care Instructions: I reviewed with the patient in detail post-care instructions. Patient is to keep the biopsy site dry overnight, and then apply bacitracin twice daily until healed. Notification Instructions: Patient will be notified of biopsy results. However, patient instructed to call the office if not contacted within 2 weeks. Billing Type: Third-Party Bill Information: Selecting Yes will display possible errors in your note based on the variables you have selected. This validation is only offered as a suggestion for you. PLEASE NOTE THAT THE VALIDATION TEXT WILL BE REMOVED WHEN YOU FINALIZE YOUR NOTE. IF YOU WANT TO FAX A PRELIMINARY NOTE YOU WILL NEED TO TOGGLE THIS TO 'NO' IF YOU DO NOT WANT IT IN YOUR FAXED NOTE.

## 2023-12-25 NOTE — PATIENT PROFILE ADULT - FLU SEASON?
[facility]  Progress Note  Name: Cassidy Zhang I  MRN: [id number]  Unit/Bed#: ALBARO -01 I Date of Admission: 12/20/2023   Date of Service: 12/25/2023 I Hospital Day: 5    Assessment/Plan   * Decompensated liver disease (HCC)  Assessment & Plan  Presents for ascites and anasarca, found also to have mild hyponatremia, hypoalbuminemia, hyperbilirubinemia, hypocoagulability, anemia, and thrombocytopenia. Ammonia wnl.  No asterixis on exam, pt A&O x3, maybe some mild confusion (delayed when answering questions, but appropriate), though pt at or near her baseline per spouse.  Reports holding lactulose for last 2-3 days for frequent BMs at home (>5 loose BM per day) - now on lactulose bid  Recently discontinued lasix and spironolactone in the o/p setting d/t worsening renal function and hyponatremia - now on PO Lasix and Aldactone  MELD score 23 on presentation  Last drink 5-6 years ago    MELD 3.0: 21 at 12/24/2023  5:04 AM  MELD-Na: 21 at 12/24/2023  5:04 AM  Calculated from:  Serum Creatinine: 0.95 mg/dL (Using min of 1 mg/dL) at 12/24/2023  5:04 AM  Serum Sodium: 138 mmol/L (Using max of 137 mmol/L) at 12/24/2023  5:04 AM  Total Bilirubin: 3.72 mg/dL at 12/24/2023  5:04 AM  Serum Albumin: 3.1 g/dL at 12/24/2023  5:04 AM  INR(ratio): 2.25 at 12/24/2023  5:04 AM  Age at listing (hypothetical): 66 years  Sex: Female at 12/24/2023  5:04 AM        Plan:  IV Lasix/Albumin -> PO Lasix and Aldactone  Diet: High protein, low salt, fluid restrict 1500 cc/day  Resume lactulose 20 mg BID and titrate to target 3-5 loose BM per day  Monitor MELD labs  Monitor Hgb and transfuse for Hgb<7  GI and Nephro appreicated  IR paracentesis drained 2.4L fluid - labs unremarkabl  Tigan for nausea   Peth negative   potential liver transplant evaluation  May need recurrent paracentesis as on outpatient     Chronic hyponatremia  Assessment & Plan  History of SIADH, however suspect also a component of hypotonic  hypervolemic hyponatremia in the setting of liver failure complicated by ascites and anasarca.     Plan:  U Na 122  Na normalized after diuretics  Hold home Na tablets, --starting NaHCO3 tabs for metabolic alkalosis  Nephrology appreciated        Coagulopathy (HCC)  Assessment & Plan  2/2 cirrhosis  Today give 2U FFP to reverse coagulopathy - check INR after transfusion.  If INR > 1.7 and PRBC needed, would give more FFP    Acute blood loss anemia  Assessment & Plan  Pt so far received 2U PRBC  Today pt w/ 4 oz BRBPR so ordered 1U PRBC - recheck Hgb after transfusion.  In setting of ABLA, keep Hgb at 7.5  If pt has another episode of BRBPR, would do CT lower GIB.    Clears diet    Status post incision and drainage  Assessment & Plan  Pt and  report dental I&D for oral abscess day prior to presentation 12/20/23, started on amoxicillin 500 mg PO Q6H. Acute infection may also have played a role in knocking patient into liver decompensation.     Continue yhveh-wj-hifjqsetk amoxicillin    Metabolic acidosis  Assessment & Plan    Nephrology on board- currently giving 1300 BID of bicarbonate    CKD (chronic kidney disease) stage 3, GFR 30-59 ml/min ([facility])  Assessment & Plan  Estimated Creatinine Clearance: 48.9 mL/min (by C-G formula based on SCr of 0.95 mg/dL).  No PRETTY this admission.  Cr near new baseline of 1.0-1.1  In the setting of type II hepatorenal syndrome.  - Midodrine 5mg TID to enhance renal perfusion  Cr stable after diuresis      Chronic pancreatitis (HCC)  Assessment & Plan  2/2 EtOH abuse, now in remission.     Continue home pancrealipase    Ascites/anasarca  Assessment & Plan  2/2 end stage liver disease, as noted above    Plan:  Diuresis, fluid restriction, dietary salt restriction as above   Nephrology and GI consults, as above  Paracentesis drained 2.4L    Essential hypertension  Assessment & Plan  BP now low due to cirrhosis.  Hold Norvasc and ARB as pt on diuretics and needing midodrine                VTE Pharmacologic Prophylaxis: VTE Score: 7 High Risk (Score >/= 5) - Pharmacological DVT Prophylaxis Contraindicated. Sequential Compression Devices Ordered.    Mobility:   Basic Mobility Inpatient Raw Score: 17  JH-HLM Goal: 5: Stand one or more mins  JH-HLM Achieved: 2: Bed activities/Dependent transfer  HLM Goal achieved. Continue to encourage appropriate mobility.    Patient Centered Rounds: I performed bedside rounds with nursing staff today.   Discussions with Specialists or Other Care Team Provider: GI    Education and Discussions with Family / Patient: Updated  ( and other family member) at bedside.      Current Length of Stay: 5 day(s)  Current Patient Status: Inpatient   Certification Statement: The patient will continue to require additional inpatient hospital stay due to ABLA  Discharge Plan: Anticipate discharge in >72 hrs to home.    Code Status: Level 1 - Full Code    Subjective:   Pt reports improved/resolving BRBPR. Pt denies confusion, dizziness, SoB, CP, Abd pain.     Objective:     Vitals:   Temp (24hrs), Av.5 °F (36.9 °C), Min:97.6 °F (36.4 °C), Max:98.9 °F (37.2 °C)    Temp:  [97.6 °F (36.4 °C)-98.9 °F (37.2 °C)] 98.3 °F (36.8 °C)  HR:  [74-89] 74  Resp:  [14-18] 18  BP: (106-129)/(41-61) 111/53  SpO2:  [98 %-100 %] 99 %  Body mass index is 23.84 kg/m².     Input and Output Summary (last 24 hours):     Intake/Output Summary (Last 24 hours) at 2023 1400  Last data filed at 2023 0105  Gross per 24 hour   Intake 950 ml   Output --   Net 950 ml       Physical Exam:   Physical Exam  HENT:      Head: Normocephalic and atraumatic.      Nose: Nose normal.      Mouth/Throat:      Mouth: Mucous membranes are moist.   Eyes:      Extraocular Movements: Extraocular movements intact.      Conjunctiva/sclera: Conjunctivae normal.   Cardiovascular:      Rate and Rhythm: Normal rate and regular rhythm.   Pulmonary:      Effort: Pulmonary effort is normal.       Breath sounds: Normal breath sounds.   Abdominal:      General: Bowel sounds are normal. There is no distension.      Palpations: Abdomen is soft.      Tenderness: There is no abdominal tenderness.   Musculoskeletal:         General: Normal range of motion.      Cervical back: Normal range of motion and neck supple.      Right lower leg: No edema.      Left lower leg: No edema.   Skin:     General: Skin is warm and dry.   Neurological:      Mental Status: She is alert and oriented to person, place, and time.          Additional Data:     Labs:  Results from last 7 days   Lab Units 12/25/23  0259   WBC Thousand/uL 4.71   HEMOGLOBIN g/dL 8.0*   HEMATOCRIT % 22.9*   PLATELETS Thousands/uL 65*   NEUTROS PCT % 46   LYMPHS PCT % 41   MONOS PCT % 10   EOS PCT % 3     Results from last 7 days   Lab Units 12/25/23  0259   SODIUM mmol/L 134*   POTASSIUM mmol/L 3.4*   CHLORIDE mmol/L 108   CO2 mmol/L 20*   BUN mg/dL 13   CREATININE mg/dL 0.97   ANION GAP mmol/L 6   CALCIUM mg/dL 9.2   ALBUMIN g/dL 2.9*   TOTAL BILIRUBIN mg/dL 4.58*   ALK PHOS U/L 76   ALT U/L 16   AST U/L 28   GLUCOSE RANDOM mg/dL 131     Results from last 7 days   Lab Units 12/25/23  0505   INR  1.69*                   Lines/Drains:  Invasive Devices       Peripheral Intravenous Line  Duration             Peripheral IV 12/23/23 Distal;Right;Ventral (anterior) Forearm 2 days                          Imaging: No pertinent imaging reviewed.    Recent Cultures (last 7 days):   Results from last 7 days   Lab Units 12/21/23  1637   GRAM STAIN RESULT  No Polys or Bacteria seen   BODY FLUID CULTURE, STERILE  No growth       Last 24 Hours Medication List:   Current Facility-Administered Medications   Medication Dose Route Frequency Provider Last Rate    acetaminophen  650 mg Oral Q8H PRN Terrell Mayberry MD      amoxicillin  500 mg Oral Q6H Terrell Mayberry MD      Diclofenac Sodium  2 g Topical Q6H PRN Terrell Mayberry MD      ferrous sulfate  325 mg Oral Daily With  Breakfast Terrell Mayberry MD      furosemide  40 mg Oral BID (diuretic) Rob Andre MD      lactulose  20 g Oral BID Terrell Mayberry MD      midodrine  5 mg Oral TID AC AQUILES Pepper      pancrelipase (Lip-Prot-Amyl)  24,000 Units Oral TID With Meals Terrell Mayberry MD      pantoprazole  40 mg Intravenous Q12H [facility] AQUILES Colby      sodium bicarbonate  1,300 mg Oral BID after meals Chelsy Sorensen MD      spironolactone  100 mg Oral Daily Rob Andre MD      trimethobenzamide  200 mg Intramuscular Q6H PRN Jodie Larose MD          Today, Patient Was Seen By: Sunday Silva MD    **Please Note: This note may have been constructed using a voice recognition system.**     Yes...

## 2024-01-08 NOTE — PROGRESS NOTE ADULT - ASSESSMENT
69 yo with hx of cholecystitis s/p percutaneous cholecystostomy tube 2 months ago.  Tube was removed after dislodgement. PT developed hepatic abscesses following a month later. PT returns with abdominal pain and WBC.  + for kleibsella bacteremia. HIDA negative.   Unsure of pt's cause of bacteremia. PT does need cholecystectomy for previous episode of cholecystitis.       -hold plavix      -cont current workup      -for lap kevin when appropriate       -GI, ID following [id number]

## 2024-02-09 NOTE — PHYSICAL THERAPY INITIAL EVALUATION ADULT - WEIGHT-BEARING RESTRICTIONS: SIT/STAND, REHAB EVAL
OK to increase atorvastatin to 80 mg daily    I sent in the 80 mg    She can take 2 of the 40 mg until she runs out    Labs in 3 months orders in thanks    Also needs a urine, eye exam and mammogram, all ordered   weight-bearing as tolerated

## 2024-02-23 NOTE — PROGRESS NOTE ADULT - PROBLEM SELECTOR PROBLEM 1
----- Message from Nato Marrero sent at 2/23/2024 10:29 AM CST -----  Contact: self  Type: Sooner Appointment Request        Caller is requesting a sooner appointment. Caller declined first available appointment listed below. Caller will not accept being placed on the waitlist and is requesting a message be sent to doctor.        Name of Caller: Patient   Best Call Back Number: [phone]  Additional Information: Pt states she needs to be advised if she should take her alendronate (FOSAMAX) 70 MG tablet  Medication before she goes to the dentist today or after wants to know is It safe. Thanks        Septic shock

## 2024-03-07 NOTE — DISCHARGE NOTE PROVIDER - NSDCDCMDCOMP_GEN_ALL_CORE
Onset: 03/07/24  Location/description: Per patient's mother, patient has had uncontrolled post-operative pain despite current medication regimen. Patient cannot move (L) lower extremity without pain, including elevating, icing and attempting to wrap heating pad around leg. Patient is non-weight bearing at this time due to pain and mother has noticed (L) foot swelling and pallor. Patient reporting itchiness, dizziness at rest and nausea.   Associated Symptoms: Per patient's mother, no numbness, tingling or vomiting noted.   What improves/worsens symptoms: Staying completely still improves symptoms. Any type of movement worsens symptoms.   Symptom specific medications: Norco 10/325mgevery 6 hours PRN- Last taken 03/06/24 @ 1700; MS Contin 15mg BID- last taken @ 0700; Ibuprofen every 6 hours PRN- last taken @ 03/06/24 @ 1400; Tylenol 500mg every 6 hours PRN- last taken 03/06/24 @ 1700; Zofran 4mg Odt PRN   Intake and Output: Per patient's mother, normal oral intake but now restricting fluids as to not have to get up to urinate. Normal urine output.   Activity level: Per patient's mother, patient has been laying on cough.   Temperature (route and time): Per patient's mother, no fevers.   Weight:   Wt Readings from Last 1 Encounters:   03/04/24 54 kg (119 lb 0.8 oz) (42 %, Z= -0.19)*     * Growth percentiles are based on CDC (Girls, 2-20 Years) data.        Recent visits (last 3-4 weeks) for same reason or recent surgery:  03/04/24- Surgery- (L) knee arthroscopy with ACL repair     PLAN:  Paged Provider- Paged Gabriele Cervantes MD (on-call provider for Collin Todd MD) via [employer] @ [phone]:     Call Back#: [phone]  Contact: Key Felix RN   MRN: [id number]   Reason: Patient with uncontrolled pain. Please call back and advise. Thank you!     No response received. Contacted [facility] @ [phone] and was transferred to [facility] to speak with RN.     Spoke with CARI Lux. Per RN, will  discuss with Dr. Todd and contact patient's mother.     Patient/Caller agrees to follow recommendations.    Reason for Disposition   [1] Post-op pain AND [2] not controlled with pain medications    Protocols used: Post-op Symptoms and [id number]     This document is complete and the patient is ready for discharge.

## 2024-03-27 NOTE — PACU DISCHARGE NOTE - NSCLINEINSERTRD_GEN_ALL_CORE
Tylenol or Ibuprofen as needed for fever or pain  Drink plenty of fluids  Over the Counter cold medication to control symptoms  If symptoms fail to improve follow up with PCP  If symptoms worsen have yourself rechecked     No

## 2024-04-28 NOTE — DISCHARGE NOTE ADULT - CAREGIVER ADDRESS
R-0Normal      naproxen (NAPROSYN) 500 MG tablet Take 1 tablet by mouth 2 times daily, Disp-60 tablet, R-0Normal                DO Pau Elizabeth James, DO  04/28/24 1539    
[address] [place] [address] dr Cherry

## 2024-05-20 NOTE — PROGRESS NOTE ADULT - SUBJECTIVE AND OBJECTIVE BOX
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  Past history: ''Per patient she has  had thyroid nodules being followed up since 4/ 2015, got thyroid ultrasound done, was found to have multiple nodules,2 cm complex cystic, 7 mm and 5 mm complex cystic in right lobe and 3 mm in left lobe ,  the then had FNA of all the nodules, all were benign , but the 3 mm on left came back atypia , further genetic testing then came back negative. Then on recent US in  11/21 , all nodules stable, but the left one now is 6 mm with micro calcifications, repeat FNA  on 11/9/21 of that nodule came back as atypia of undetermined sig, and thyroseq was positive with BRAF mutation. The nodule is only 6 mm ,but since  the risk of malignancy is very high with BRAF mutation, so  she will  need surgery. No family h/o thyroid disorder or cancer.no h/o neck radiation.  She then had  left superior partial lobectomy on 1/5/22 . 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 No lymph nodes found or submitted. Postop course was fine, no complications She had some voice changes, now better.''  Here for follow up. TSH in 4/23 and now in 11/23  is again normal. Feels well, no new complains. Energy is fine, weight stable. Her thyroid functions have been normal, not needing thyroid hormone replacement. US done in 12/22 and now in 11/23 , stable post left thyroid surgery, stable nodules.    [facility]  Ceasar Mora, Palliative Care Attending  Contact Info: message on [employer] Teams (Ceasar Mora)    Patient is no longer a candidate for further HD.  Family is aware, hospice referral was placed by primary team.  Discussed with family members at bedside, they are understanding of the plan are in agreement.  Discontinued unnecessary medications and interventions. [facility]  Ceasar Mora, Palliative Care Attending  Contact Info: message on [employer] Teams (Ceasar Mora)    SUBJECTIVE AND OBJECTIVE:  INTERVAL HPI/OVERNIGHT EVENTS: Interval events noted. Patient is no longer a candidate for further HD. Family is aware, hospice referral was placed by primary team. Discussed with family members at bedside, they are understanding of the plan are in agreement. Discontinued unnecessary medications and interventions.    Allergies  ertapenem (Urticaria)  Purell (Rash)    MEDICATIONS  (STANDING):    MEDICATIONS  (PRN):  glycopyrrolate Injectable 0.4 milliGRAM(s) IV Push every 4 hours PRN Excessive Secretions  HYDROmorphone  Injectable 0.5 milliGRAM(s) IV Push every 3 hours PRN Mild/Moderate/Severe Pain  HYDROmorphone  Injectable 0.5 milliGRAM(s) IV Push every 3 hours PRN Dyspnea and/or RR >20  LORazepam   Injectable 1 milliGRAM(s) IV Push every 4 hours PRN Anxiety/Agitation      ITEMS UNCHECKED ARE NOT PRESENT    PRESENT SYMPTOMS: [x]Unable to obtain due to poor mentation   Source if other than patient:  []Family   []Team     Pain: [ ] yes [ ] no  QOL impact -   Location -                    Aggravating factors -  Quality -  Radiation -  Timing -  Severity (0-10 scale):  Minimal acceptable level (0-10 scale):    PAIN AD Score: 1  http://geriatrictoolkit.[place].[facility]/cog/painad.pdf (press ctrl +  left click to view)    Dyspnea:                           []Mild  []Moderate []Severe  Anxiety:                             []Mild []Moderate []Severe  Fatigue:                             []Mild []Moderate []Severe  Nausea:                             []Mild []Moderate []Severe  Loss of appetite:              []Mild []Moderate []Severe  Constipation:                    []Mild []Moderate []Severe    Other Symptoms:  []All other review of systems negative     Palliative Performance Status Version 2: 20%  http://npcrc.org/files/news/palliative_performance_scale_ppsv2.pdf    PHYSICAL EXAM:  Vital Signs Last 24 Hrs  T(C): 36.8 (23 May 2021 11:16), Max: 36.8 (23 May 2021 11:16)  T(F): 98.2 (23 May 2021 11:16), Max: 98.2 (23 May 2021 11:16)  HR: 95 (23 May 2021 11:16) (90 - 100)  BP: 99/57 (23 May 2021 11:16) (97/55 - 104/70)  BP(mean): --  RR: 18 (23 May 2021 11:16) (18 - 18)  SpO2: 95% (23 May 2021 11:16) (95% - 99%) I&O's Summary      GENERAL:  []Alert  []Oriented x   [x]Lethargic  []Cachexia  []Unarousable  []Verbal  [x]Non-Verbal  Behavioral:   [] Anxiety  [x] Delirium [] Agitation [] Other  HEENT:  []Normal   [x]Dry mouth   []ET Tube/Trach  []Oral lesions  PULMONARY:   []Clear []Tachypnea  []Audible excessive secretions   []Rhonchi        []Right []Left []Bilateral  [x]Crackles        []Right []Left [x]Bilateral  []Wheezing     []Right []Left []Bilateral  CARDIOVASCULAR:    [x]Regular []Irregular []Tachy  []Abe []Murmur []Other  GASTROINTESTINAL:  [x]Soft  [x]Distended   [x]+BS  [x]Non tender []Tender  []PEG []OGT/ NGT  Last BM: ?  GENITOURINARY:  []Normal [] Incontinent   [x]Oliguria/Anuria   []Corcoran  MUSCULOSKELETAL:   []Normal   []Weakness  [x]Bed/Wheelchair bound []Edema  NEUROLOGIC:   []No focal deficits  [] Cognitive impairment  [] Dysphagia []Dysarthria [] Paresis [x]Encephalopathic  SKIN:   [x]Normal   []Pressure ulcer(s)  []Rash    CRITICAL CARE:  [ ] Shock Present  [ ]Septic [ ]Cardiogenic [ ]Neurologic [ ]Hypovolemic  [ ]  Vasopressors [ ]  Inotropes   [ ] Respiratory failure present [ ] Mechanical Ventilation [ ] Non-invasive ventilatory support [ ] High-Flow  [ ] Acute  [ ] Chronic [ ] Hypoxic  [ ] Hypercarbic [ ] Other  [ ] Other organ failure    LABS: None new    RADIOLOGY & ADDITIONAL STUDIES: None new

## 2024-08-14 NOTE — H&P ADULT - PROBLEM SELECTOR PROBLEM 1
Procedure Request Form: Cervical Cancer Screening       Date Requested: 24  Patient: Nany Gutiérrez  Patient : 1990   Referring Provider: Deirdre Montoya, DO        Date of Procedure ______________________________       The above patient has informed us that they have completed their   most recent Cervical Cancer Screening at your facility. Please complete   this form and attach all corresponding procedure reports/results.    Comments __________________________________________________________  ____________________________________________________________________  ____________________________________________________________________  ____________________________________________________________________    Facility Completing Procedure _________________________________________    Form Completed By (print name) _______________________________________      Signature __________________________________________________________      These reports are needed for  compliance.    Please fax this completed form and a copy of the procedure report to our office located at [address] [place] [address] as soon as possible to Fax [phone] pushpa Syed: Phone [phone]    We thank you for your assistance in treating our mutual patient.      
      Procedure Request Form: Cervical Cancer Screening      Date Requested: 24  Patient: Nany Gutiérrez  Patient : 1990   Referring Provider: Deirdre Montoya, DO        Date of Procedure _______3-40-91_______________________       The above patient has informed us that they have completed their   most recent Cervical Cancer Screening at your facility. Please complete   this form and attach all corresponding procedure reports/results.    Comments __________________________________________________________  ____________________________________________________________________  ____________________________________________________________________  ____________________________________________________________________    Facility Completing Procedure _________________________________________    Form Completed By (print name) _______________________________________      Signature __________________________________________________________      These reports are needed for  compliance.    Please fax this completed form and a copy of the procedure report to our office located at [address] [place] [address] as soon as possible to Fax [phone] pushpa Syed: Phone [phone]    We thank you for your assistance in treating our mutual patient.      
Sepsis

## 2024-09-26 NOTE — ED ADULT NURSE NOTE - NS PRO PASSIVE SMOKE EXP
[facility] to get an auth. Writer left a note on Aracelis's desk to speed up the process.      No
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
## 2025-01-17 NOTE — PROGRESS NOTE ADULT - PROBLEM SELECTOR PROBLEM 1
Group Therapy Note    Date: 1/17/2025    Group Start Time: 1430  Group End Time: 1500  Group Topic: Group Documentation    [facility] BHI PICRonny Chaudhari LPN    Recreation Group Note        Date: January 17, 2025 Start Time: 2:30pm  End Time: 3pm      Number of Participants in Group & Unit Census:  0/3    Topic: Journaling    Goal of Group:Staff provides patients time to journal any thoughts that come to mind regarding their treatment. Staff continues to provide support when needed and assist patients with identifying how journaling can be used as a positive coping skill.      Comments:     Patient did not participate in Recreation group, despite staff encouragement and explanation of benefits.  Patient remain seclusive to self.  Q15 minute safety checks maintained for patient safety and will continue to encourage patient to attend unit programming.           Right ankle pain, unspecified chronicity

## 2025-03-07 NOTE — PROGRESS NOTE ADULT - PROBLEM SELECTOR PLAN 3
proBNP elevated 2/2 to  acute on chronic diastolic heart failure  Echocardiogram with low normal LV systolic function, no significant valve issues.  iv lasix daily wt , strict /os   Dr. Mena (Cardio) consulted Yes

## 2025-05-23 NOTE — H&P ADULT - BIRTH SEX
Monitor: The problem is stable.  Evaluation: No labs/tests required today.  Assessment/Treatment:  Continue current treatment/monitoring regimen.   Female

## 2025-06-17 NOTE — PROGRESS NOTE ADULT - I WAS PHYSICALLY PRESENT FOR THE KEY PORTIONS OF THE EVALUATION AND MANAGEMENT (E/M) SERVICE PROVIDED.  I AGREE WITH THE ABOVE HISTORY, PHYSICAL, AND PLAN WHICH I HAVE REVIEWED AND EDITED WHERE APPROPRIATE
Writer received a call from Celena from lab who states the patient's platelet count is critical at 20 today, 6/17/25, which writer reported directly to Doctor Oleg and Michelle BARROW R.N., so they are now also aware.  
Statement Selected